# Patient Record
Sex: MALE | Race: WHITE | NOT HISPANIC OR LATINO
[De-identification: names, ages, dates, MRNs, and addresses within clinical notes are randomized per-mention and may not be internally consistent; named-entity substitution may affect disease eponyms.]

---

## 2018-02-26 ENCOUNTER — TRANSCRIPTION ENCOUNTER (OUTPATIENT)
Age: 78
End: 2018-02-26

## 2018-02-27 ENCOUNTER — APPOINTMENT (OUTPATIENT)
Dept: ORTHOPEDIC SURGERY | Facility: CLINIC | Age: 78
End: 2018-02-27
Payer: MEDICARE

## 2018-02-27 VITALS — BODY MASS INDEX: 27.83 KG/M2 | HEIGHT: 73 IN | WEIGHT: 210 LBS

## 2018-02-27 PROCEDURE — 99213 OFFICE O/P EST LOW 20 MIN: CPT

## 2018-02-27 PROCEDURE — 73552 X-RAY EXAM OF FEMUR 2/>: CPT

## 2018-02-27 PROCEDURE — 73502 X-RAY EXAM HIP UNI 2-3 VIEWS: CPT | Mod: LT

## 2018-03-14 ENCOUNTER — APPOINTMENT (OUTPATIENT)
Dept: ORTHOPEDIC SURGERY | Facility: CLINIC | Age: 78
End: 2018-03-14
Payer: MEDICARE

## 2018-03-14 PROCEDURE — 72170 X-RAY EXAM OF PELVIS: CPT

## 2018-03-14 PROCEDURE — 73552 X-RAY EXAM OF FEMUR 2/>: CPT | Mod: LT

## 2018-03-14 PROCEDURE — 99213 OFFICE O/P EST LOW 20 MIN: CPT

## 2018-04-17 ENCOUNTER — RX RENEWAL (OUTPATIENT)
Age: 78
End: 2018-04-17

## 2018-05-01 ENCOUNTER — APPOINTMENT (OUTPATIENT)
Dept: ORTHOPEDIC SURGERY | Facility: CLINIC | Age: 78
End: 2018-05-01
Payer: MEDICARE

## 2018-05-01 PROCEDURE — 73552 X-RAY EXAM OF FEMUR 2/>: CPT | Mod: LT

## 2018-05-01 PROCEDURE — 99213 OFFICE O/P EST LOW 20 MIN: CPT

## 2018-05-01 PROCEDURE — 72170 X-RAY EXAM OF PELVIS: CPT

## 2018-05-09 ENCOUNTER — APPOINTMENT (OUTPATIENT)
Dept: ORTHOPEDIC SURGERY | Facility: CLINIC | Age: 78
End: 2018-05-09

## 2018-05-17 ENCOUNTER — FORM ENCOUNTER (OUTPATIENT)
Age: 78
End: 2018-05-17

## 2018-05-18 ENCOUNTER — APPOINTMENT (OUTPATIENT)
Dept: CT IMAGING | Facility: CLINIC | Age: 78
End: 2018-05-18
Payer: MEDICARE

## 2018-05-18 ENCOUNTER — OUTPATIENT (OUTPATIENT)
Dept: OUTPATIENT SERVICES | Facility: HOSPITAL | Age: 78
LOS: 1 days | End: 2018-05-18
Payer: MEDICARE

## 2018-05-18 DIAGNOSIS — Z00.8 ENCOUNTER FOR OTHER GENERAL EXAMINATION: ICD-10-CM

## 2018-05-18 DIAGNOSIS — Z95.810 PRESENCE OF AUTOMATIC (IMPLANTABLE) CARDIAC DEFIBRILLATOR: Chronic | ICD-10-CM

## 2018-05-18 DIAGNOSIS — Z96.60 PRESENCE OF UNSPECIFIED ORTHOPEDIC JOINT IMPLANT: Chronic | ICD-10-CM

## 2018-05-18 DIAGNOSIS — Z98.89 OTHER SPECIFIED POSTPROCEDURAL STATES: Chronic | ICD-10-CM

## 2018-05-18 DIAGNOSIS — Z96.649 PRESENCE OF UNSPECIFIED ARTIFICIAL HIP JOINT: Chronic | ICD-10-CM

## 2018-05-18 DIAGNOSIS — D29.20 BENIGN NEOPLASM OF UNSPECIFIED TESTIS: Chronic | ICD-10-CM

## 2018-05-18 PROCEDURE — 76376 3D RENDER W/INTRP POSTPROCES: CPT

## 2018-05-18 PROCEDURE — 73700 CT LOWER EXTREMITY W/O DYE: CPT | Mod: 26,LT

## 2018-05-18 PROCEDURE — 76376 3D RENDER W/INTRP POSTPROCES: CPT | Mod: 26

## 2018-05-18 PROCEDURE — 73700 CT LOWER EXTREMITY W/O DYE: CPT

## 2018-06-12 ENCOUNTER — APPOINTMENT (OUTPATIENT)
Dept: ORTHOPEDIC SURGERY | Facility: CLINIC | Age: 78
End: 2018-06-12
Payer: MEDICARE

## 2018-06-12 DIAGNOSIS — M97.02XD PERIPROSTHETIC FRACTURE AROUND INTERNAL PROSTHETIC LEFT HIP JOINT, SUBSEQUENT ENCOUNTER: ICD-10-CM

## 2018-06-12 DIAGNOSIS — S32.592D OTHER SPECIFIED FRACTURE OF LEFT PUBIS, SUBSEQUENT ENCOUNTER FOR FRACTURE WITH ROUTINE HEALING: ICD-10-CM

## 2018-06-12 PROCEDURE — 99213 OFFICE O/P EST LOW 20 MIN: CPT

## 2018-06-12 PROCEDURE — 72170 X-RAY EXAM OF PELVIS: CPT

## 2018-06-13 ENCOUNTER — FORM ENCOUNTER (OUTPATIENT)
Age: 78
End: 2018-06-13

## 2018-06-14 ENCOUNTER — APPOINTMENT (OUTPATIENT)
Dept: NUCLEAR MEDICINE | Facility: IMAGING CENTER | Age: 78
End: 2018-06-14
Payer: MEDICARE

## 2018-06-14 ENCOUNTER — OUTPATIENT (OUTPATIENT)
Dept: OUTPATIENT SERVICES | Facility: HOSPITAL | Age: 78
LOS: 1 days | End: 2018-06-14
Payer: MEDICARE

## 2018-06-14 ENCOUNTER — RESULT REVIEW (OUTPATIENT)
Age: 78
End: 2018-06-14

## 2018-06-14 DIAGNOSIS — D29.20 BENIGN NEOPLASM OF UNSPECIFIED TESTIS: Chronic | ICD-10-CM

## 2018-06-14 DIAGNOSIS — Z96.649 PRESENCE OF UNSPECIFIED ARTIFICIAL HIP JOINT: Chronic | ICD-10-CM

## 2018-06-14 DIAGNOSIS — Z95.810 PRESENCE OF AUTOMATIC (IMPLANTABLE) CARDIAC DEFIBRILLATOR: Chronic | ICD-10-CM

## 2018-06-14 DIAGNOSIS — M97.02XD PERIPROSTHETIC FRACTURE AROUND INTERNAL PROSTHETIC LEFT HIP JOINT, SUBSEQUENT ENCOUNTER: ICD-10-CM

## 2018-06-14 DIAGNOSIS — Z98.89 OTHER SPECIFIED POSTPROCEDURAL STATES: Chronic | ICD-10-CM

## 2018-06-14 DIAGNOSIS — Z96.60 PRESENCE OF UNSPECIFIED ORTHOPEDIC JOINT IMPLANT: Chronic | ICD-10-CM

## 2018-06-14 PROCEDURE — 78320: CPT | Mod: 26

## 2018-06-14 PROCEDURE — 78999 UNLISTED MISC PX DX NUC MED: CPT

## 2018-06-14 PROCEDURE — 78315 BONE IMAGING 3 PHASE: CPT

## 2018-06-14 PROCEDURE — A9561: CPT

## 2020-06-12 ENCOUNTER — TRANSCRIPTION ENCOUNTER (OUTPATIENT)
Age: 80
End: 2020-06-12

## 2020-09-07 ENCOUNTER — TRANSCRIPTION ENCOUNTER (OUTPATIENT)
Age: 80
End: 2020-09-07

## 2021-01-01 ENCOUNTER — APPOINTMENT (OUTPATIENT)
Dept: MULTI SPECIALTY CLINIC | Facility: CLINIC | Age: 81
End: 2021-01-01
Payer: MEDICARE

## 2021-01-01 ENCOUNTER — NON-APPOINTMENT (OUTPATIENT)
Age: 81
End: 2021-01-01

## 2021-01-01 ENCOUNTER — RESULT REVIEW (OUTPATIENT)
Age: 81
End: 2021-01-01

## 2021-01-01 ENCOUNTER — APPOINTMENT (OUTPATIENT)
Dept: INFECTIOUS DISEASE | Facility: CLINIC | Age: 81
End: 2021-01-01
Payer: MEDICARE

## 2021-01-01 ENCOUNTER — OUTPATIENT (OUTPATIENT)
Dept: OUTPATIENT SERVICES | Facility: HOSPITAL | Age: 81
LOS: 1 days | Discharge: HOME | End: 2021-01-01
Payer: MEDICARE

## 2021-01-01 ENCOUNTER — APPOINTMENT (OUTPATIENT)
Dept: CT IMAGING | Facility: IMAGING CENTER | Age: 81
End: 2021-01-01
Payer: MEDICARE

## 2021-01-01 ENCOUNTER — APPOINTMENT (OUTPATIENT)
Dept: CARDIOLOGY | Facility: CLINIC | Age: 81
End: 2021-01-01
Payer: MEDICARE

## 2021-01-01 ENCOUNTER — OUTPATIENT (OUTPATIENT)
Dept: OUTPATIENT SERVICES | Facility: HOSPITAL | Age: 81
LOS: 1 days | End: 2021-01-01
Payer: MEDICARE

## 2021-01-01 VITALS
BODY MASS INDEX: 25.18 KG/M2 | WEIGHT: 190 LBS | SYSTOLIC BLOOD PRESSURE: 120 MMHG | HEART RATE: 85 BPM | DIASTOLIC BLOOD PRESSURE: 74 MMHG | HEIGHT: 72.83 IN | OXYGEN SATURATION: 100 %

## 2021-01-01 VITALS
TEMPERATURE: 92.3 F | DIASTOLIC BLOOD PRESSURE: 82 MMHG | RESPIRATION RATE: 18 BRPM | OXYGEN SATURATION: 99 % | BODY MASS INDEX: 25.27 KG/M2 | HEIGHT: 72.83 IN | SYSTOLIC BLOOD PRESSURE: 148 MMHG | HEART RATE: 70 BPM | WEIGHT: 190.7 LBS

## 2021-01-01 VITALS — SYSTOLIC BLOOD PRESSURE: 112 MMHG | DIASTOLIC BLOOD PRESSURE: 60 MMHG

## 2021-01-01 DIAGNOSIS — Z96.60 PRESENCE OF UNSPECIFIED ORTHOPEDIC JOINT IMPLANT: Chronic | ICD-10-CM

## 2021-01-01 DIAGNOSIS — D29.20 BENIGN NEOPLASM OF UNSPECIFIED TESTIS: Chronic | ICD-10-CM

## 2021-01-01 DIAGNOSIS — C25.9 MALIGNANT NEOPLASM OF PANCREAS, UNSPECIFIED: ICD-10-CM

## 2021-01-01 DIAGNOSIS — Z89.421 ACQUIRED ABSENCE OF OTHER RIGHT TOE(S): Chronic | ICD-10-CM

## 2021-01-01 DIAGNOSIS — Z90.410 ACQUIRED TOTAL ABSENCE OF PANCREAS: Chronic | ICD-10-CM

## 2021-01-01 DIAGNOSIS — Z95.810 PRESENCE OF AUTOMATIC (IMPLANTABLE) CARDIAC DEFIBRILLATOR: Chronic | ICD-10-CM

## 2021-01-01 DIAGNOSIS — Z95.2 PRESENCE OF PROSTHETIC HEART VALVE: Chronic | ICD-10-CM

## 2021-01-01 DIAGNOSIS — Z87.81 PERSONAL HISTORY OF (HEALED) TRAUMATIC FRACTURE: Chronic | ICD-10-CM

## 2021-01-01 DIAGNOSIS — B95.2 BACTEREMIA: ICD-10-CM

## 2021-01-01 DIAGNOSIS — Z98.89 OTHER SPECIFIED POSTPROCEDURAL STATES: Chronic | ICD-10-CM

## 2021-01-01 DIAGNOSIS — R78.81 BACTEREMIA: ICD-10-CM

## 2021-01-01 LAB
ALBUMIN SERPL ELPH-MCNC: 4.1 G/DL
ALP BLD-CCNC: 67 U/L
ALT SERPL-CCNC: 5 U/L
ANION GAP SERPL CALC-SCNC: 13 MMOL/L
AST SERPL-CCNC: 18 U/L
BASOPHILS # BLD AUTO: 0 K/UL — SIGNIFICANT CHANGE UP (ref 0–0.2)
BASOPHILS NFR BLD AUTO: 0 % — SIGNIFICANT CHANGE UP (ref 0–2)
BILIRUB SERPL-MCNC: 0.2 MG/DL
BUN SERPL-MCNC: 10 MG/DL
CALCIUM SERPL-MCNC: 8.9 MG/DL
CANCER AG19-9 SERPL-ACNC: 47 U/ML
CHLORIDE SERPL-SCNC: 95 MMOL/L
CO2 SERPL-SCNC: 24 MMOL/L
CREAT SERPL-MCNC: 0.82 MG/DL
EOSINOPHIL # BLD AUTO: 0.44 K/UL — SIGNIFICANT CHANGE UP (ref 0–0.5)
EOSINOPHIL NFR BLD AUTO: 9 % — HIGH (ref 0–6)
GLUCOSE SERPL-MCNC: 99 MG/DL
HCT VFR BLD CALC: 32.3 % — LOW (ref 39–50)
HGB BLD-MCNC: 9.8 G/DL — LOW (ref 13–17)
LYMPHOCYTES # BLD AUTO: 0.64 K/UL — LOW (ref 1–3.3)
LYMPHOCYTES # BLD AUTO: 13 % — SIGNIFICANT CHANGE UP (ref 13–44)
MCHC RBC-ENTMCNC: 29.5 PG — SIGNIFICANT CHANGE UP (ref 27–34)
MCHC RBC-ENTMCNC: 30.3 G/DL — LOW (ref 32–36)
MCV RBC AUTO: 97.3 FL — SIGNIFICANT CHANGE UP (ref 80–100)
MONOCYTES # BLD AUTO: 0.49 K/UL — SIGNIFICANT CHANGE UP (ref 0–0.9)
MONOCYTES NFR BLD AUTO: 10 % — SIGNIFICANT CHANGE UP (ref 2–14)
NEUTROPHILS # BLD AUTO: 3.35 K/UL — SIGNIFICANT CHANGE UP (ref 1.8–7.4)
NEUTROPHILS NFR BLD AUTO: 68 % — SIGNIFICANT CHANGE UP (ref 43–77)
NRBC # BLD: 0 /100 — SIGNIFICANT CHANGE UP (ref 0–0)
NRBC # BLD: SIGNIFICANT CHANGE UP /100 WBCS (ref 0–0)
PLAT MORPH BLD: NORMAL — SIGNIFICANT CHANGE UP
PLATELET # BLD AUTO: 171 K/UL — SIGNIFICANT CHANGE UP (ref 150–400)
POTASSIUM SERPL-SCNC: 4 MMOL/L
PROT SERPL-MCNC: 6.8 G/DL
RBC # BLD: 3.32 M/UL — LOW (ref 4.2–5.8)
RBC # FLD: 15.6 % — HIGH (ref 10.3–14.5)
RBC BLD AUTO: SIGNIFICANT CHANGE UP
SODIUM SERPL-SCNC: 132 MMOL/L
WBC # BLD: 4.92 K/UL — SIGNIFICANT CHANGE UP (ref 3.8–10.5)
WBC # FLD AUTO: 4.92 K/UL — SIGNIFICANT CHANGE UP (ref 3.8–10.5)

## 2021-01-01 PROCEDURE — 71260 CT THORAX DX C+: CPT | Mod: MG

## 2021-01-01 PROCEDURE — 99215 OFFICE O/P EST HI 40 MIN: CPT

## 2021-01-01 PROCEDURE — 99024 POSTOP FOLLOW-UP VISIT: CPT

## 2021-01-01 PROCEDURE — 77290 THER RAD SIMULAJ FIELD CPLX: CPT | Mod: 26

## 2021-01-01 PROCEDURE — 71260 CT THORAX DX C+: CPT | Mod: 26,MG

## 2021-01-01 PROCEDURE — 77333 RADIATION TREATMENT AID(S): CPT | Mod: 26,59

## 2021-01-01 PROCEDURE — 74177 CT ABD & PELVIS W/CONTRAST: CPT | Mod: MG

## 2021-01-01 PROCEDURE — G1004: CPT

## 2021-01-01 PROCEDURE — 74177 CT ABD & PELVIS W/CONTRAST: CPT | Mod: 26,MG

## 2021-01-01 PROCEDURE — 82565 ASSAY OF CREATININE: CPT

## 2021-01-01 PROCEDURE — 77263 THER RADIOLOGY TX PLNG CPLX: CPT

## 2021-01-01 PROCEDURE — 99202 OFFICE O/P NEW SF 15 MIN: CPT | Mod: 25,GC

## 2021-01-01 PROCEDURE — 77334 RADIATION TREATMENT AID(S): CPT | Mod: 26

## 2021-01-01 PROCEDURE — 93000 ELECTROCARDIOGRAM COMPLETE: CPT

## 2021-01-01 PROCEDURE — 99441: CPT | Mod: 95

## 2021-01-01 RX ORDER — OMEPRAZOLE 20 MG
20 TABLET, DELAYED RELEASE (ENTERIC COATED) ORAL
Refills: 0 | Status: DISCONTINUED | COMMUNITY
End: 2021-01-01

## 2021-01-01 RX ORDER — ALBUTEROL SULFATE 90 UG/1
108 (90 BASE) INHALANT RESPIRATORY (INHALATION)
Refills: 0 | Status: DISCONTINUED | COMMUNITY
End: 2021-01-01

## 2021-01-01 RX ORDER — RIVAROXABAN 2.5 MG/1
TABLET, FILM COATED ORAL DAILY
Refills: 0 | Status: DISCONTINUED | COMMUNITY
End: 2021-01-01

## 2021-01-01 RX ORDER — DULOXETINE HYDROCHLORIDE 60 MG/1
60 CAPSULE, DELAYED RELEASE PELLETS ORAL
Qty: 90 | Refills: 3 | Status: DISCONTINUED | COMMUNITY
End: 2021-01-01

## 2021-01-01 RX ORDER — SACUBITRIL AND VALSARTAN 24; 26 MG/1; MG/1
24-26 TABLET, FILM COATED ORAL TWICE DAILY
Qty: 60 | Refills: 1 | Status: DISCONTINUED | COMMUNITY
End: 2021-01-01

## 2021-01-01 RX ORDER — OXYCODONE 10 MG/1
10 TABLET ORAL EVERY 6 HOURS
Refills: 0 | Status: DISCONTINUED | COMMUNITY
End: 2021-01-01

## 2021-01-01 RX ORDER — SODIUM PHOSPHATE, DIBASIC AND SODIUM PHOSPHATE, MONOBASIC 7; 19 G/230ML; G/230ML
ENEMA RECTAL
Refills: 0 | Status: DISCONTINUED | COMMUNITY
End: 2021-01-01

## 2021-08-04 ENCOUNTER — OUTPATIENT (OUTPATIENT)
Dept: OUTPATIENT SERVICES | Facility: HOSPITAL | Age: 81
LOS: 1 days | Discharge: HOME | End: 2021-08-04
Payer: MEDICARE

## 2021-08-04 ENCOUNTER — APPOINTMENT (OUTPATIENT)
Dept: WOUND CARE | Facility: HOSPITAL | Age: 81
End: 2021-08-04
Payer: MEDICARE

## 2021-08-04 ENCOUNTER — RESULT REVIEW (OUTPATIENT)
Age: 81
End: 2021-08-04

## 2021-08-04 VITALS
HEART RATE: 80 BPM | HEIGHT: 73 IN | TEMPERATURE: 97.1 F | SYSTOLIC BLOOD PRESSURE: 117 MMHG | BODY MASS INDEX: 27.83 KG/M2 | OXYGEN SATURATION: 100 % | RESPIRATION RATE: 20 BRPM | WEIGHT: 210 LBS | DIASTOLIC BLOOD PRESSURE: 72 MMHG

## 2021-08-04 DIAGNOSIS — Z95.810 PRESENCE OF AUTOMATIC (IMPLANTABLE) CARDIAC DEFIBRILLATOR: Chronic | ICD-10-CM

## 2021-08-04 DIAGNOSIS — L97.501 NON-PRESSURE CHRONIC ULCER OF OTHER PART OF UNSPECIFIED FOOT LIMITED TO BREAKDOWN OF SKIN: ICD-10-CM

## 2021-08-04 DIAGNOSIS — Z96.649 PRESENCE OF UNSPECIFIED ARTIFICIAL HIP JOINT: Chronic | ICD-10-CM

## 2021-08-04 DIAGNOSIS — Z86.69 PERSONAL HISTORY OF OTHER DISEASES OF THE NERVOUS SYSTEM AND SENSE ORGANS: ICD-10-CM

## 2021-08-04 DIAGNOSIS — Z96.60 PRESENCE OF UNSPECIFIED ORTHOPEDIC JOINT IMPLANT: Chronic | ICD-10-CM

## 2021-08-04 DIAGNOSIS — D29.20 BENIGN NEOPLASM OF UNSPECIFIED TESTIS: Chronic | ICD-10-CM

## 2021-08-04 DIAGNOSIS — Z86.79 PERSONAL HISTORY OF OTHER DISEASES OF THE CIRCULATORY SYSTEM: ICD-10-CM

## 2021-08-04 DIAGNOSIS — S41.109A UNSPECIFIED OPEN WOUND OF UNSPECIFIED UPPER ARM, INITIAL ENCOUNTER: ICD-10-CM

## 2021-08-04 DIAGNOSIS — Z98.89 OTHER SPECIFIED POSTPROCEDURAL STATES: Chronic | ICD-10-CM

## 2021-08-04 PROCEDURE — 73630 X-RAY EXAM OF FOOT: CPT | Mod: 26,RT

## 2021-08-04 PROCEDURE — G0463: CPT

## 2021-08-04 PROCEDURE — 73630 X-RAY EXAM OF FOOT: CPT

## 2021-08-04 PROCEDURE — 99203 OFFICE O/P NEW LOW 30 MIN: CPT

## 2021-08-04 RX ORDER — NEOMYCIN AND POLYMYXIN B SULFATES AND DEXAMETHASONE 3.5; 10000; 1 MG/G; [IU]/G; MG/G
3.5-10000-0.1 OINTMENT OPHTHALMIC
Qty: 4 | Refills: 0 | Status: DISCONTINUED | COMMUNITY
Start: 2018-04-10 | End: 2021-08-04

## 2021-08-04 RX ORDER — HYDROCODONE BITARTRATE AND ACETAMINOPHEN 5; 325 MG/1; MG/1
5-325 TABLET ORAL
Qty: 40 | Refills: 0 | Status: DISCONTINUED | COMMUNITY
Start: 2018-06-13 | End: 2021-08-04

## 2021-08-04 RX ORDER — DOXYCYCLINE HYCLATE 100 MG/1
100 CAPSULE ORAL
Qty: 30 | Refills: 0 | Status: DISCONTINUED | COMMUNITY
Start: 2018-04-18 | End: 2021-08-04

## 2021-08-04 RX ORDER — TRAMADOL HYDROCHLORIDE 50 MG/1
50 TABLET, COATED ORAL
Qty: 50 | Refills: 0 | Status: DISCONTINUED | COMMUNITY
Start: 2018-03-14 | End: 2021-08-04

## 2021-08-04 RX ORDER — TESTOSTERONE CYPIONATE 200 MG/ML
200 INJECTION, SOLUTION INTRAMUSCULAR
Qty: 1 | Refills: 0 | Status: DISCONTINUED | COMMUNITY
Start: 2018-03-27 | End: 2021-08-04

## 2021-08-04 RX ORDER — CEFADROXIL 500 MG/1
500 CAPSULE ORAL
Qty: 20 | Refills: 0 | Status: DISCONTINUED | COMMUNITY
Start: 2018-03-24 | End: 2021-08-04

## 2021-08-04 RX ORDER — TRAMADOL HYDROCHLORIDE 50 MG/1
50 TABLET, COATED ORAL
Qty: 40 | Refills: 0 | Status: DISCONTINUED | COMMUNITY
Start: 2018-05-01 | End: 2021-08-04

## 2021-08-04 RX ORDER — DICLOFENAC SODIUM 75 MG/1
75 TABLET, DELAYED RELEASE ORAL
Qty: 60 | Refills: 3 | Status: DISCONTINUED | COMMUNITY
Start: 2018-02-27 | End: 2021-08-04

## 2021-08-04 RX ORDER — HYDROCODONE BITARTRATE AND ACETAMINOPHEN 10; 325 MG/1; MG/1
10-325 TABLET ORAL
Qty: 60 | Refills: 0 | Status: DISCONTINUED | COMMUNITY
Start: 2018-03-05 | End: 2021-08-04

## 2021-08-04 RX ORDER — TOBRAMYCIN AND DEXAMETHASONE 3; 1 MG/G; MG/G
0.3-0.1 OINTMENT OPHTHALMIC
Qty: 4 | Refills: 0 | Status: DISCONTINUED | COMMUNITY
Start: 2018-03-24 | End: 2021-08-04

## 2021-08-04 RX ORDER — MELOXICAM 15 MG/1
15 TABLET ORAL DAILY
Qty: 21 | Refills: 0 | Status: DISCONTINUED | COMMUNITY
Start: 2018-03-06 | End: 2021-08-04

## 2021-08-04 RX ORDER — CIPROFLOXACIN AND DEXAMETHASONE 3; 1 MG/ML; MG/ML
0.3-0.1 SUSPENSION/ DROPS AURICULAR (OTIC)
Qty: 8 | Refills: 0 | Status: DISCONTINUED | COMMUNITY
Start: 2018-02-03 | End: 2021-08-04

## 2021-08-04 NOTE — REVIEW OF SYSTEMS
[Skin Wound] : skin wound [FreeTextEntry3] : glaucoma, macular degeneration [FreeTextEntry5] : cardiac defibrillator, htn, hld [FreeTextEntry9] : bilateral hammertoes [de-identified] : right hallux dorsal IPJ ulcer down to skin, subcutaneous tissue, fat, bone [de-identified] : lumbar radiculopathy, sciatica, spinal stenosis [de-identified] : pancreatic cancer

## 2021-08-04 NOTE — PHYSICAL EXAM
[2+] : left 2+ [Skin Ulcer] : ulcer [2 x 2] : 2 x 2  [Ankle Swelling (On Exam)] : not present [Varicose Veins Of Lower Extremities] : not present [] : not present [de-identified] : A&Ox3, NAD [de-identified] : Bilateral hammertoes [de-identified] : right hallux dorsal IPJ ulcer down to skin, subcutaneous tissue, fat, bone [de-identified] : diminished light touch sensation bilaterally [FreeTextEntry1] : Right dorsal hallux [FreeTextEntry2] : 0.4 [FreeTextEntry3] : 0.4 [FreeTextEntry4] : 0.1 [de-identified] : scant serosanguineous [de-identified] : mild maceration [de-identified] : 100% [de-identified] : Alginate Ag [de-identified] : NSC [de-identified] : 3x Weekly

## 2021-08-04 NOTE — HISTORY OF PRESENT ILLNESS
[FreeTextEntry1] : The wound is a traumatic wound on right great toe.  Patient reports that it started a year ago as a blister from wearing sneakers without socks.  Patient has been tx Dr. Rosalio DPM in [place] over the past year and the wound has healed an reopened several times.  Dr. Santamaria did xray a couple of weeks ago and patient reports no fx or bone infection.   Patient reports that he was at tx at [facility] last week due to fall.  Patient reports that he bruised his back (xray negative for fx).  Patient was also tx for pneumonia and had venous doppler of swollen legs which was negative for DVT.  Patient's daughter referred patient to [facility].

## 2021-08-04 NOTE — PLAN
[FreeTextEntry1] : Patient examined and evaluated at this time.\par Advised regarding need for xray and bone scan to rule out osteomyelitis.\par All questions answered to satisfaction and patient and son verbalized understanding.\par Patient will likely require surgical intervention.\par Continue local wound care and offloading.\par Spent 30 minutes for patient care and medical decision making.\par Patient to follow up in 1 week.\par

## 2021-08-04 NOTE — VITALS
[] : No [de-identified] : Pain scale:  0/10 - Patient reports no c/o pains or discomforts at present.

## 2021-08-04 NOTE — ASSESSMENT
[Verbal] : Verbal [Patient] : Patient [Family member] : Family member [Good - alert, interested, motivated] : Good - alert, interested, motivated [Verbalizes knowledge/Understanding] : Verbalizes knowledge/understanding [Foot Care] : foot care [Skin Care] : skin care [Signs and symptoms of infection] : sign and symptoms of infection [How and When to Call] : how and when to call [Off-loading] : off-loading [Patient responsibility to plan of care] : patient responsibility to plan of care [] : Yes [Labs and Tests] : labs and tests [Stable] : stable [Home] : Home [Cane] : Cane [Not Applicable - Long Term Care/Home Health Agency] : Long Term Care/Home Health Agency: Not Applicable [FreeTextEntry2] : Promote optimal skin integrity, offloading, infection prevention\par  [FreeTextEntry4] : Circulation:\par \par Dorsalis Pedis:  bilateral palpable\par Posterior Tibialis:  bilateral  palpable\par Doppler Pulses:  bilateral  present\par Extremity Color:  bilateral pink \par Extremity Temperature:  bilateral warm\par Capillary Refill:  bilateral <3 sec \par Non-invasive vascular studies not ordered as per DPM.\par \par Patient did not bring in medication list this visit.  Patient was able to recall most medications today.  Instructed him to bring in list next visit.\par \par Xray of right foot to be done today at [facility].\par \par Bone scan ordered - submitted for authorization\par \par Patient reports that he hit his left arm on wall when trying to stop his grandson from falling. No open wound present.  Lump on forearm extending to elbow.  Patient was seen by Dr. White with no treatment and was referred to see his Orthopedic Surgeon.\par \par f/u 1 week\par

## 2021-08-05 DIAGNOSIS — H40.9 UNSPECIFIED GLAUCOMA: ICD-10-CM

## 2021-08-05 DIAGNOSIS — I10 ESSENTIAL (PRIMARY) HYPERTENSION: ICD-10-CM

## 2021-08-05 DIAGNOSIS — Z95.810 PRESENCE OF AUTOMATIC (IMPLANTABLE) CARDIAC DEFIBRILLATOR: ICD-10-CM

## 2021-08-05 DIAGNOSIS — L89.894 PRESSURE ULCER OF OTHER SITE, STAGE 4: ICD-10-CM

## 2021-08-05 DIAGNOSIS — Z95.4 PRESENCE OF OTHER HEART-VALVE REPLACEMENT: ICD-10-CM

## 2021-08-05 DIAGNOSIS — M20.41 OTHER HAMMER TOE(S) (ACQUIRED), RIGHT FOOT: ICD-10-CM

## 2021-08-05 DIAGNOSIS — H35.30 UNSPECIFIED MACULAR DEGENERATION: ICD-10-CM

## 2021-08-05 DIAGNOSIS — Z79.899 OTHER LONG TERM (CURRENT) DRUG THERAPY: ICD-10-CM

## 2021-08-05 DIAGNOSIS — M48.00 SPINAL STENOSIS, SITE UNSPECIFIED: ICD-10-CM

## 2021-08-05 DIAGNOSIS — Z85.07 PERSONAL HISTORY OF MALIGNANT NEOPLASM OF PANCREAS: ICD-10-CM

## 2021-08-05 DIAGNOSIS — Z79.2 LONG TERM (CURRENT) USE OF ANTIBIOTICS: ICD-10-CM

## 2021-08-05 DIAGNOSIS — E78.00 PURE HYPERCHOLESTEROLEMIA, UNSPECIFIED: ICD-10-CM

## 2021-08-05 DIAGNOSIS — Z87.81 PERSONAL HISTORY OF (HEALED) TRAUMATIC FRACTURE: ICD-10-CM

## 2021-08-05 DIAGNOSIS — M20.42 OTHER HAMMER TOE(S) (ACQUIRED), LEFT FOOT: ICD-10-CM

## 2021-08-05 DIAGNOSIS — Z96.649 PRESENCE OF UNSPECIFIED ARTIFICIAL HIP JOINT: ICD-10-CM

## 2021-08-05 DIAGNOSIS — Z98.890 OTHER SPECIFIED POSTPROCEDURAL STATES: ICD-10-CM

## 2021-08-05 DIAGNOSIS — Z79.01 LONG TERM (CURRENT) USE OF ANTICOAGULANTS: ICD-10-CM

## 2021-08-11 ENCOUNTER — OUTPATIENT (OUTPATIENT)
Dept: OUTPATIENT SERVICES | Facility: HOSPITAL | Age: 81
LOS: 1 days | Discharge: HOME | End: 2021-08-11
Payer: MEDICARE

## 2021-08-11 ENCOUNTER — APPOINTMENT (OUTPATIENT)
Dept: WOUND CARE | Facility: HOSPITAL | Age: 81
End: 2021-08-11
Payer: MEDICARE

## 2021-08-11 VITALS
SYSTOLIC BLOOD PRESSURE: 97 MMHG | TEMPERATURE: 97.8 F | BODY MASS INDEX: 27.83 KG/M2 | OXYGEN SATURATION: 99 % | WEIGHT: 210 LBS | DIASTOLIC BLOOD PRESSURE: 59 MMHG | HEART RATE: 80 BPM | RESPIRATION RATE: 18 BRPM | HEIGHT: 73 IN

## 2021-08-11 DIAGNOSIS — Z96.60 PRESENCE OF UNSPECIFIED ORTHOPEDIC JOINT IMPLANT: Chronic | ICD-10-CM

## 2021-08-11 DIAGNOSIS — S41.109A UNSPECIFIED OPEN WOUND OF UNSPECIFIED UPPER ARM, INITIAL ENCOUNTER: ICD-10-CM

## 2021-08-11 DIAGNOSIS — Z95.810 PRESENCE OF AUTOMATIC (IMPLANTABLE) CARDIAC DEFIBRILLATOR: Chronic | ICD-10-CM

## 2021-08-11 DIAGNOSIS — D29.20 BENIGN NEOPLASM OF UNSPECIFIED TESTIS: Chronic | ICD-10-CM

## 2021-08-11 DIAGNOSIS — Z98.89 OTHER SPECIFIED POSTPROCEDURAL STATES: Chronic | ICD-10-CM

## 2021-08-11 DIAGNOSIS — Z96.649 PRESENCE OF UNSPECIFIED ARTIFICIAL HIP JOINT: Chronic | ICD-10-CM

## 2021-08-11 PROCEDURE — 99213 OFFICE O/P EST LOW 20 MIN: CPT

## 2021-08-11 PROCEDURE — G0463: CPT

## 2021-08-11 RX ORDER — DULOXETINE HYDROCHLORIDE 60 MG/1
60 CAPSULE, DELAYED RELEASE ORAL
Refills: 0 | Status: ACTIVE | COMMUNITY

## 2021-08-11 RX ORDER — LATANOPROST/PF 0.005 %
0.01 DROPS OPHTHALMIC (EYE)
Refills: 0 | Status: ACTIVE | COMMUNITY

## 2021-08-11 RX ORDER — PROCHLORPERAZINE MALEATE 10 MG/1
10 TABLET, FILM COATED ORAL
Refills: 0 | Status: ACTIVE | COMMUNITY

## 2021-08-11 RX ORDER — DORZOLAMIDE HYDROCHLORIDE AND TIMOLOL MALEATE 20; 5 MG/ML; MG/ML
22.3-6.8 SOLUTION/ DROPS OPHTHALMIC
Refills: 0 | Status: ACTIVE | COMMUNITY

## 2021-08-11 RX ORDER — KETOROLAC TROMETHAMINE 5 MG/ML
0.5 SOLUTION OPHTHALMIC
Refills: 0 | Status: ACTIVE | COMMUNITY

## 2021-08-11 RX ORDER — LIPASE/PROTEASE/AMYLASE 4.5-25-20K
CAPSULE,DELAYED RELEASE (ENTERIC COATED) ORAL
Refills: 0 | Status: ACTIVE | COMMUNITY

## 2021-08-11 RX ORDER — TAMSULOSIN HYDROCHLORIDE 0.4 MG/1
0.4 CAPSULE ORAL
Refills: 0 | Status: ACTIVE | COMMUNITY

## 2021-08-11 NOTE — REVIEW OF SYSTEMS
[Skin Wound] : skin wound [FreeTextEntry3] : glaucoma, macular degeneration [FreeTextEntry5] : cardiac defibrillator, htn, hld [FreeTextEntry9] : bilateral hammertoes [de-identified] : right hallux dorsal IPJ ulcer down to skin, subcutaneous tissue, fat, bone [de-identified] : lumbar radiculopathy, sciatica, spinal stenosis [de-identified] : pancreatic cancer

## 2021-08-11 NOTE — PLAN
[FreeTextEntry1] : Patient examined and evaluated at this time.\par Reviewed radiographs with patient.\par Advised regarding need for bone scan to rule out osteomyelitis.\par All questions answered to satisfaction and patient verbalized understanding.\par Continue local wound care and offloading.\par Pt will likely require surgical intervention.\par Spent 20 minutes for patient care and medical decision making.\par Patient to follow up in 1 week.\par

## 2021-08-11 NOTE — ASSESSMENT
[FreeTextEntry2] : Promote optimal skin integrity, offloading, infection prevention\par  [FreeTextEntry4] : Patient reports that he hit his left arm on wall when trying to stop his grandson from falling. No open wound present.  Lump on forearm extending to elbow.  Patient was seen by Dr. Shah. MD stated Pt should get an X-ray. After Pt should see an Orthopedic Surgeon.\par Pt to F/U to [facility] in 1 Week \par

## 2021-08-11 NOTE — REVIEW OF SYSTEMS
[Skin Wound] : skin wound [FreeTextEntry3] : glaucoma, macular degeneration [FreeTextEntry5] : cardiac defibrillator, htn, hld [FreeTextEntry9] : bilateral hammertoes [de-identified] : right hallux dorsal IPJ ulcer down to skin, subcutaneous tissue, fat, bone [de-identified] : lumbar radiculopathy, sciatica, spinal stenosis [de-identified] : pancreatic cancer

## 2021-08-11 NOTE — ASSESSMENT
[Verbal] : Verbal [Patient] : Patient [Family member] : Family member [Good - alert, interested, motivated] : Good - alert, interested, motivated [Verbalizes knowledge/Understanding] : Verbalizes knowledge/understanding [Foot Care] : foot care [Skin Care] : skin care [Signs and symptoms of infection] : sign and symptoms of infection [How and When to Call] : how and when to call [Labs and Tests] : labs and tests [Off-loading] : off-loading [Patient responsibility to plan of care] : patient responsibility to plan of care [] : Yes [Stable] : stable [Home] : Home [Cane] : Cane [FreeTextEntry2] : Infection Prevention \par Bone Scan\par Restore Optimal Skin Integrity\par Surgery \par \par  [FreeTextEntry4] : DPM reviewed Xray of right foot with Pt. Pt Verbalized Understanding \par Pt was approved for Bone scan. Pt aware & will schedule appointment & will follow up to [facility] after scan.\par Pt also seen by Dr. Shah\par \par Patient reports that he hit his left arm on wall when trying to stop his grandson from falling. No open wound present.  Lump on forearm extending to elbow.  Patient was seen by Dr. White with no treatment and was referred to see his Orthopedic Surgeon.\par \par f/u 1 week\par

## 2021-08-11 NOTE — PHYSICAL EXAM
[2 x 2] : 2 x 2  [2+] : left 2+ [Ankle Swelling (On Exam)] : not present [Varicose Veins Of Lower Extremities] : not present [] : not present [Skin Ulcer] : ulcer [de-identified] : A&Ox3, NAD [de-identified] : Bilateral hammertoes [de-identified] : right hallux dorsal IPJ ulcer down to skin, subcutaneous tissue, fat, bone [de-identified] : diminished light touch sensation bilaterally [FreeTextEntry1] : Right dorsal hallux [FreeTextEntry2] : 0.4 [FreeTextEntry3] : 0.4 [FreeTextEntry4] : 0.1 [de-identified] : scant serosanguineous [de-identified] : mild maceration [de-identified] : 100% [de-identified] : Silver Alginate  [de-identified] : Cleansed with Normal Saline.  [de-identified] : 3x Weekly

## 2021-08-11 NOTE — HISTORY OF PRESENT ILLNESS
[FreeTextEntry1] : 81 yo WM, here with his wife for a podiatry appt with Dr. López. Pt also mentioned that he had a lump/mass on his left elbow. Pt stated he banged his left elbow about 3 mos ago, cutting the area, causing bleeding. Was seen in a urgent care center. The wound eventually healed on its own, but a nontender mass/lump still present in the left area. Laceration scars seen in the left elbow and findings c/w with a seroma. Nontender/no erythema or any evidence of an abscess. Advised a xray of the area and to f/u with an ortho surgeon.

## 2021-08-11 NOTE — HISTORY OF PRESENT ILLNESS
[FreeTextEntry1] : Pt seen for right hallux ulcer down to skin, subcutaneous tissue, fat, and bone. Pt relates he was able to obtain the radiographs as advised. Denies any other complaints at this time.

## 2021-08-11 NOTE — PHYSICAL EXAM
[JVD] : no jugular venous distention  [Normal Thyroid] : the thyroid was normal [Normal Breath Sounds] : Normal breath sounds [Normal Heart Sounds] : normal heart sounds [Normal Rate and Rhythm] : normal rate and rhythm [Abdomen Masses] : No abdominal massess [Abdomen Tenderness] : ~T ~M No abdominal tenderness [Tender] : nontender [Enlarged] : not enlarged [Alert] : alert [Oriented to Person] : oriented to person [Oriented to Place] : oriented to place [Oriented to Time] : oriented to time [Calm] : calm [de-identified] : elderly WM, NAD, alert, Ox3. [de-identified] : nontender; no erythema/edema/cellulitis [FreeTextEntry1] : Elbow-mass under skin- no open wounds [de-identified] : No Treatment  [de-identified] : Cleansed with Normal Saline.  [TWNoteComboBox1] : Left [TWNoteComboBox4] : None

## 2021-08-12 DIAGNOSIS — E78.00 PURE HYPERCHOLESTEROLEMIA, UNSPECIFIED: ICD-10-CM

## 2021-08-12 DIAGNOSIS — Z98.890 OTHER SPECIFIED POSTPROCEDURAL STATES: ICD-10-CM

## 2021-08-12 DIAGNOSIS — H40.9 UNSPECIFIED GLAUCOMA: ICD-10-CM

## 2021-08-12 DIAGNOSIS — H35.30 UNSPECIFIED MACULAR DEGENERATION: ICD-10-CM

## 2021-08-12 DIAGNOSIS — Z95.4 PRESENCE OF OTHER HEART-VALVE REPLACEMENT: ICD-10-CM

## 2021-08-12 DIAGNOSIS — Z79.01 LONG TERM (CURRENT) USE OF ANTICOAGULANTS: ICD-10-CM

## 2021-08-12 DIAGNOSIS — L89.894 PRESSURE ULCER OF OTHER SITE, STAGE 4: ICD-10-CM

## 2021-08-12 DIAGNOSIS — Z79.51 LONG TERM (CURRENT) USE OF INHALED STEROIDS: ICD-10-CM

## 2021-08-12 DIAGNOSIS — Z79.2 LONG TERM (CURRENT) USE OF ANTIBIOTICS: ICD-10-CM

## 2021-08-12 DIAGNOSIS — I10 ESSENTIAL (PRIMARY) HYPERTENSION: ICD-10-CM

## 2021-08-12 DIAGNOSIS — Z96.649 PRESENCE OF UNSPECIFIED ARTIFICIAL HIP JOINT: ICD-10-CM

## 2021-08-12 DIAGNOSIS — Z95.810 PRESENCE OF AUTOMATIC (IMPLANTABLE) CARDIAC DEFIBRILLATOR: ICD-10-CM

## 2021-08-12 DIAGNOSIS — Z79.899 OTHER LONG TERM (CURRENT) DRUG THERAPY: ICD-10-CM

## 2021-08-12 DIAGNOSIS — R22.32 LOCALIZED SWELLING, MASS AND LUMP, LEFT UPPER LIMB: ICD-10-CM

## 2021-08-12 DIAGNOSIS — M48.00 SPINAL STENOSIS, SITE UNSPECIFIED: ICD-10-CM

## 2021-08-12 DIAGNOSIS — Z85.07 PERSONAL HISTORY OF MALIGNANT NEOPLASM OF PANCREAS: ICD-10-CM

## 2021-08-12 DIAGNOSIS — Z87.81 PERSONAL HISTORY OF (HEALED) TRAUMATIC FRACTURE: ICD-10-CM

## 2021-08-12 DIAGNOSIS — M20.41 OTHER HAMMER TOE(S) (ACQUIRED), RIGHT FOOT: ICD-10-CM

## 2021-08-12 DIAGNOSIS — M20.42 OTHER HAMMER TOE(S) (ACQUIRED), LEFT FOOT: ICD-10-CM

## 2021-08-17 ENCOUNTER — OUTPATIENT (OUTPATIENT)
Dept: OUTPATIENT SERVICES | Facility: HOSPITAL | Age: 81
LOS: 1 days | End: 2021-08-17
Payer: MEDICARE

## 2021-08-17 DIAGNOSIS — Z95.810 PRESENCE OF AUTOMATIC (IMPLANTABLE) CARDIAC DEFIBRILLATOR: Chronic | ICD-10-CM

## 2021-08-17 DIAGNOSIS — Z96.60 PRESENCE OF UNSPECIFIED ORTHOPEDIC JOINT IMPLANT: Chronic | ICD-10-CM

## 2021-08-17 DIAGNOSIS — L89.894 PRESSURE ULCER OF OTHER SITE, STAGE 4: ICD-10-CM

## 2021-08-17 DIAGNOSIS — D29.20 BENIGN NEOPLASM OF UNSPECIFIED TESTIS: Chronic | ICD-10-CM

## 2021-08-17 DIAGNOSIS — Z96.649 PRESENCE OF UNSPECIFIED ARTIFICIAL HIP JOINT: Chronic | ICD-10-CM

## 2021-08-17 DIAGNOSIS — Z98.89 OTHER SPECIFIED POSTPROCEDURAL STATES: Chronic | ICD-10-CM

## 2021-08-17 PROCEDURE — 73080 X-RAY EXAM OF ELBOW: CPT | Mod: 26,LT

## 2021-08-18 ENCOUNTER — RESULT REVIEW (OUTPATIENT)
Age: 81
End: 2021-08-18

## 2021-08-18 PROCEDURE — A9569: CPT

## 2021-08-18 PROCEDURE — 78800 RP LOCLZJ TUM 1 AREA 1 D IMG: CPT | Mod: 26,MH

## 2021-08-18 PROCEDURE — 78800 RP LOCLZJ TUM 1 AREA 1 D IMG: CPT

## 2021-08-18 PROCEDURE — 73080 X-RAY EXAM OF ELBOW: CPT

## 2021-08-27 ENCOUNTER — OUTPATIENT (OUTPATIENT)
Dept: OUTPATIENT SERVICES | Facility: HOSPITAL | Age: 81
LOS: 1 days | Discharge: HOME | End: 2021-08-27
Payer: MEDICARE

## 2021-08-27 ENCOUNTER — APPOINTMENT (OUTPATIENT)
Dept: WOUND CARE | Facility: HOSPITAL | Age: 81
End: 2021-08-27
Payer: MEDICARE

## 2021-08-27 VITALS
TEMPERATURE: 97.1 F | BODY MASS INDEX: 27.83 KG/M2 | WEIGHT: 210 LBS | RESPIRATION RATE: 20 BRPM | OXYGEN SATURATION: 98 % | HEART RATE: 83 BPM | SYSTOLIC BLOOD PRESSURE: 106 MMHG | DIASTOLIC BLOOD PRESSURE: 59 MMHG | HEIGHT: 73 IN

## 2021-08-27 DIAGNOSIS — Z96.649 PRESENCE OF UNSPECIFIED ARTIFICIAL HIP JOINT: Chronic | ICD-10-CM

## 2021-08-27 DIAGNOSIS — D29.20 BENIGN NEOPLASM OF UNSPECIFIED TESTIS: Chronic | ICD-10-CM

## 2021-08-27 DIAGNOSIS — Z96.60 PRESENCE OF UNSPECIFIED ORTHOPEDIC JOINT IMPLANT: Chronic | ICD-10-CM

## 2021-08-27 DIAGNOSIS — Z95.810 PRESENCE OF AUTOMATIC (IMPLANTABLE) CARDIAC DEFIBRILLATOR: Chronic | ICD-10-CM

## 2021-08-27 DIAGNOSIS — Z98.89 OTHER SPECIFIED POSTPROCEDURAL STATES: Chronic | ICD-10-CM

## 2021-08-27 DIAGNOSIS — R22.32 LOCALIZED SWELLING, MASS AND LUMP, LEFT UPPER LIMB: ICD-10-CM

## 2021-08-27 DIAGNOSIS — L89.894 PRESSURE ULCER OF OTHER SITE, STAGE 4: ICD-10-CM

## 2021-08-27 PROCEDURE — 99213 OFFICE O/P EST LOW 20 MIN: CPT

## 2021-08-27 PROCEDURE — G0463: CPT

## 2021-08-27 NOTE — PLAN
[FreeTextEntry1] : bone scan showed no osteomyelitis right great toe but localized skin infection\par medihoney started with DD \par xray of left elbow.showed age unknown fx stressed he should see orthopedist\par f/u 1 wks.to see Dr López\par 25 minutes spent in review,evaluation and treatment planning

## 2021-08-27 NOTE — PHYSICAL EXAM
[2 x 2] : 2 x 2  [Normal Thyroid] : the thyroid was normal [Normal Breath Sounds] : Normal breath sounds [Normal Heart Sounds] : normal heart sounds [Normal Rate and Rhythm] : normal rate and rhythm [Alert] : alert [Oriented to Person] : oriented to person [Oriented to Place] : oriented to place [Oriented to Time] : oriented to time [Calm] : calm [JVD] : no jugular venous distention  [Abdomen Masses] : No abdominal massess [Abdomen Tenderness] : ~T ~M No abdominal tenderness [Tender] : nontender [Enlarged] : not enlarged [de-identified] : elderly WM, NAD, alert, Ox3. [FreeTextEntry1] : Elbow-mass under skin- no open wounds [de-identified] : Cleansed with Normal Saline.  [de-identified] : No Treatment  [FreeTextEntry7] : Hallux [FreeTextEntry8] : 0.6 [FreeTextEntry9] : 0.7 [de-identified] : 0.2 [de-identified] : with mild maceration  [de-identified] : 15% [de-identified] : 85% [de-identified] : Medihoney  [de-identified] : Cleansed with Normal saline\par  [TWNoteComboBox1] : Left [TWNoteComboBox4] : None [TWNoteComboBox9] : Right [de-identified] : Small [de-identified] : No [de-identified] : Traumatic [de-identified] : No [de-identified] : Normal [de-identified] : None [de-identified] : None [de-identified] : Yes [de-identified] : 3x Weekly [de-identified] : Primary Dressing

## 2021-08-27 NOTE — VITALS
[Pain related to present condition?] : The patient's  pain is not related to present condition. [] : No [de-identified] : 0

## 2021-08-27 NOTE — ASSESSMENT
[Verbal] : Verbal [Patient] : Patient [Family member] : Family member [Good - alert, interested, motivated] : Good - alert, interested, motivated [Verbalizes knowledge/Understanding] : Verbalizes knowledge/understanding [Foot Care] : foot care [Skin Care] : skin care [Signs and symptoms of infection] : sign and symptoms of infection [How and When to Call] : how and when to call [Labs and Tests] : labs and tests [Off-loading] : off-loading [Patient responsibility to plan of care] : patient responsibility to plan of care [] : Yes [Stable] : stable [Home] : Home [Cane] : Cane [FreeTextEntry2] : Promote optimal skin integrity\par Offloading\par Infection prevention\par Enzymatic debridement\par F/U 1 week \par  [FreeTextEntry4] : MD reviewed xray and bone scan results with the patient. The patient verbalized understanding of results. \par Patient was provided with contact information for podiatrist- Dr. Neumann\par MD escribed [facility]\morro F/U 1 week

## 2021-08-27 NOTE — REVIEW OF SYSTEMS
[Skin Wound] : skin wound [FreeTextEntry3] : glaucoma, macular degeneration [FreeTextEntry5] : cardiac defibrillator, htn, hld [FreeTextEntry9] : bilateral hammertoes [de-identified] : right hallux dorsal IPJ ulcer down to skin, subcutaneous tissue, fat, bone [de-identified] : lumbar radiculopathy, sciatica, spinal stenosis [de-identified] : pancreatic cancer

## 2021-08-27 NOTE — HISTORY OF PRESENT ILLNESS
[FreeTextEntry1] : 79 yo WM, here with his wife for a podiatry appt with Dr. López. Pt also mentioned that he had a lump/mass on his left elbow. Pt stated he banged his left elbow about 3 mos ago, cutting the area, causing bleeding. Was seen in a urgent care center. The wound eventually healed on its own, but a nontender mass/lump still present in the left area. Laceration scars seen in the left elbow and findings c/w with a seroma. Nontender/no erythema or any evidence of an abscess. Advised a xray of the area and to f/u with an ortho surgeon.\par \par 8/27/21 right hallux with small amount of necrotic tissue. No erythema and no drainage.. Patient to see Dr López

## 2021-08-30 DIAGNOSIS — Z85.07 PERSONAL HISTORY OF MALIGNANT NEOPLASM OF PANCREAS: ICD-10-CM

## 2021-08-30 DIAGNOSIS — I10 ESSENTIAL (PRIMARY) HYPERTENSION: ICD-10-CM

## 2021-08-30 DIAGNOSIS — Z95.4 PRESENCE OF OTHER HEART-VALVE REPLACEMENT: ICD-10-CM

## 2021-08-30 DIAGNOSIS — Z79.899 OTHER LONG TERM (CURRENT) DRUG THERAPY: ICD-10-CM

## 2021-08-30 DIAGNOSIS — M48.00 SPINAL STENOSIS, SITE UNSPECIFIED: ICD-10-CM

## 2021-08-30 DIAGNOSIS — Z79.51 LONG TERM (CURRENT) USE OF INHALED STEROIDS: ICD-10-CM

## 2021-08-30 DIAGNOSIS — H35.30 UNSPECIFIED MACULAR DEGENERATION: ICD-10-CM

## 2021-08-30 DIAGNOSIS — M20.42 OTHER HAMMER TOE(S) (ACQUIRED), LEFT FOOT: ICD-10-CM

## 2021-08-30 DIAGNOSIS — L89.894 PRESSURE ULCER OF OTHER SITE, STAGE 4: ICD-10-CM

## 2021-08-30 DIAGNOSIS — E78.00 PURE HYPERCHOLESTEROLEMIA, UNSPECIFIED: ICD-10-CM

## 2021-08-30 DIAGNOSIS — Z79.2 LONG TERM (CURRENT) USE OF ANTIBIOTICS: ICD-10-CM

## 2021-08-30 DIAGNOSIS — Z98.890 OTHER SPECIFIED POSTPROCEDURAL STATES: ICD-10-CM

## 2021-08-30 DIAGNOSIS — Z95.810 PRESENCE OF AUTOMATIC (IMPLANTABLE) CARDIAC DEFIBRILLATOR: ICD-10-CM

## 2021-08-30 DIAGNOSIS — M20.41 OTHER HAMMER TOE(S) (ACQUIRED), RIGHT FOOT: ICD-10-CM

## 2021-08-30 DIAGNOSIS — Z87.81 PERSONAL HISTORY OF (HEALED) TRAUMATIC FRACTURE: ICD-10-CM

## 2021-08-30 DIAGNOSIS — Z96.649 PRESENCE OF UNSPECIFIED ARTIFICIAL HIP JOINT: ICD-10-CM

## 2021-08-30 DIAGNOSIS — H40.9 UNSPECIFIED GLAUCOMA: ICD-10-CM

## 2021-08-30 DIAGNOSIS — R22.32 LOCALIZED SWELLING, MASS AND LUMP, LEFT UPPER LIMB: ICD-10-CM

## 2021-08-30 DIAGNOSIS — Z79.01 LONG TERM (CURRENT) USE OF ANTICOAGULANTS: ICD-10-CM

## 2021-09-03 ENCOUNTER — OUTPATIENT (OUTPATIENT)
Dept: OUTPATIENT SERVICES | Facility: HOSPITAL | Age: 81
LOS: 1 days | Discharge: HOME | End: 2021-09-03
Payer: MEDICARE

## 2021-09-03 ENCOUNTER — APPOINTMENT (OUTPATIENT)
Dept: WOUND CARE | Facility: HOSPITAL | Age: 81
End: 2021-09-03
Payer: MEDICARE

## 2021-09-03 VITALS
RESPIRATION RATE: 20 BRPM | DIASTOLIC BLOOD PRESSURE: 85 MMHG | TEMPERATURE: 96.8 F | HEART RATE: 71 BPM | OXYGEN SATURATION: 100 % | BODY MASS INDEX: 27.83 KG/M2 | WEIGHT: 210 LBS | HEIGHT: 73 IN | SYSTOLIC BLOOD PRESSURE: 146 MMHG

## 2021-09-03 DIAGNOSIS — Z95.810 PRESENCE OF AUTOMATIC (IMPLANTABLE) CARDIAC DEFIBRILLATOR: Chronic | ICD-10-CM

## 2021-09-03 DIAGNOSIS — L89.894 PRESSURE ULCER OF OTHER SITE, STAGE 4: ICD-10-CM

## 2021-09-03 DIAGNOSIS — Z96.649 PRESENCE OF UNSPECIFIED ARTIFICIAL HIP JOINT: Chronic | ICD-10-CM

## 2021-09-03 DIAGNOSIS — Z96.60 PRESENCE OF UNSPECIFIED ORTHOPEDIC JOINT IMPLANT: Chronic | ICD-10-CM

## 2021-09-03 DIAGNOSIS — D29.20 BENIGN NEOPLASM OF UNSPECIFIED TESTIS: Chronic | ICD-10-CM

## 2021-09-03 DIAGNOSIS — Z98.89 OTHER SPECIFIED POSTPROCEDURAL STATES: Chronic | ICD-10-CM

## 2021-09-03 PROCEDURE — G0463: CPT

## 2021-09-03 PROCEDURE — 99213 OFFICE O/P EST LOW 20 MIN: CPT

## 2021-09-07 DIAGNOSIS — Z85.07 PERSONAL HISTORY OF MALIGNANT NEOPLASM OF PANCREAS: ICD-10-CM

## 2021-09-07 DIAGNOSIS — I10 ESSENTIAL (PRIMARY) HYPERTENSION: ICD-10-CM

## 2021-09-07 DIAGNOSIS — L89.894 PRESSURE ULCER OF OTHER SITE, STAGE 4: ICD-10-CM

## 2021-09-07 DIAGNOSIS — Z79.51 LONG TERM (CURRENT) USE OF INHALED STEROIDS: ICD-10-CM

## 2021-09-07 DIAGNOSIS — Z79.01 LONG TERM (CURRENT) USE OF ANTICOAGULANTS: ICD-10-CM

## 2021-09-07 DIAGNOSIS — Z87.81 PERSONAL HISTORY OF (HEALED) TRAUMATIC FRACTURE: ICD-10-CM

## 2021-09-07 DIAGNOSIS — Z95.810 PRESENCE OF AUTOMATIC (IMPLANTABLE) CARDIAC DEFIBRILLATOR: ICD-10-CM

## 2021-09-07 DIAGNOSIS — M48.00 SPINAL STENOSIS, SITE UNSPECIFIED: ICD-10-CM

## 2021-09-07 DIAGNOSIS — H35.30 UNSPECIFIED MACULAR DEGENERATION: ICD-10-CM

## 2021-09-07 DIAGNOSIS — R22.32 LOCALIZED SWELLING, MASS AND LUMP, LEFT UPPER LIMB: ICD-10-CM

## 2021-09-07 DIAGNOSIS — Z95.4 PRESENCE OF OTHER HEART-VALVE REPLACEMENT: ICD-10-CM

## 2021-09-07 DIAGNOSIS — Z79.2 LONG TERM (CURRENT) USE OF ANTIBIOTICS: ICD-10-CM

## 2021-09-07 DIAGNOSIS — Z98.890 OTHER SPECIFIED POSTPROCEDURAL STATES: ICD-10-CM

## 2021-09-07 DIAGNOSIS — M20.41 OTHER HAMMER TOE(S) (ACQUIRED), RIGHT FOOT: ICD-10-CM

## 2021-09-07 DIAGNOSIS — Z96.649 PRESENCE OF UNSPECIFIED ARTIFICIAL HIP JOINT: ICD-10-CM

## 2021-09-07 DIAGNOSIS — Z79.899 OTHER LONG TERM (CURRENT) DRUG THERAPY: ICD-10-CM

## 2021-09-07 DIAGNOSIS — M20.42 OTHER HAMMER TOE(S) (ACQUIRED), LEFT FOOT: ICD-10-CM

## 2021-09-07 DIAGNOSIS — H40.9 UNSPECIFIED GLAUCOMA: ICD-10-CM

## 2021-09-07 DIAGNOSIS — E78.00 PURE HYPERCHOLESTEROLEMIA, UNSPECIFIED: ICD-10-CM

## 2021-09-07 NOTE — REVIEW OF SYSTEMS
[Fever] : no fever [Chills] : no chills [Eye Pain] : no eye pain [Shortness Of Breath] : no shortness of breath [Abdominal Pain] : no abdominal pain [Vomiting] : no vomiting [Skin Wound] : skin wound [FreeTextEntry3] : glaucoma, macular degeneration [FreeTextEntry5] : cardiac defibrillator, htn, hld [FreeTextEntry9] : bilateral hammertoes [de-identified] : right hallux dorsal IPJ ulcer down to skin, subcutaneous tissue, fat, bone [de-identified] : lumbar radiculopathy, sciatica, spinal stenosis [de-identified] : pancreatic cancer

## 2021-09-07 NOTE — PHYSICAL EXAM
[2 x 2] : 2 x 2  [2+] : left 2+ [Ankle Swelling (On Exam)] : not present [Varicose Veins Of Lower Extremities] : not present [] : not present [Skin Ulcer] : ulcer [de-identified] : A&Ox3, NAD [de-identified] : HTN, HLD , pacemaker  [de-identified] : Bilateral hammertoes [de-identified] : right hallux dorsal IPJ ulcer down to skin, subcutaneous tissue, fat, bone [de-identified] : diminished light touch sensation bilaterally [FreeTextEntry1] : Elbow-mass under skin- no open wounds [de-identified] : Cleansed with Normal Saline.  [de-identified] : No Treatment  [FreeTextEntry7] : Hallux [FreeTextEntry9] : 0.7 [FreeTextEntry8] : 0.6 [de-identified] : 0.2 [de-identified] : with mild maceration  [de-identified] : Allevyn Ag [de-identified] : Cleansed with Normal saline\par Cloth Tape [TWNoteComboBox1] : Left [TWNoteComboBox4] : None [TWNoteComboBox9] : Right [de-identified] : Small [de-identified] : No [de-identified] : Traumatic [de-identified] : No [de-identified] : Normal [de-identified] : None [de-identified] : None [de-identified] : 100% [de-identified] : No [de-identified] : 3x Weekly [de-identified] : Primary Dressing

## 2021-09-07 NOTE — PLAN
[FreeTextEntry1] : Patient awaiting pancreatic treatment . patient high risk for surgical correction of the deformed hallux . At this time palliative care , off loading of the ulcer  Spent 20 minutes for patient care and medical decision making.\par

## 2021-09-07 NOTE — HISTORY OF PRESENT ILLNESS
[FreeTextEntry1] : Hammer  toe of the right hallux with dorsal pressure , stage 3 , skin, fat , subcutaneous  , no soi

## 2021-09-07 NOTE — ASSESSMENT
[Verbal] : Verbal [Patient] : Patient [Family member] : Family member [Verbalizes knowledge/Understanding] : Verbalizes knowledge/understanding [Good - alert, interested, motivated] : Good - alert, interested, motivated [Foot Care] : foot care [Skin Care] : skin care [Signs and symptoms of infection] : sign and symptoms of infection [How and When to Call] : how and when to call [Off-loading] : off-loading [Labs and Tests] : labs and tests [Patient responsibility to plan of care] : patient responsibility to plan of care [] : Yes [Stable] : stable [Home] : Home [Cane] : Cane [FreeTextEntry2] : Promote optimal skin integrity\par Offloading\par Infection prevention\par Enzymatic debridement\par F/U 1 week \par  [FreeTextEntry4] : F/U 1 week

## 2021-09-10 ENCOUNTER — APPOINTMENT (OUTPATIENT)
Dept: WOUND CARE | Facility: HOSPITAL | Age: 81
End: 2021-09-10
Payer: MEDICARE

## 2021-09-10 ENCOUNTER — OUTPATIENT (OUTPATIENT)
Dept: OUTPATIENT SERVICES | Facility: HOSPITAL | Age: 81
LOS: 1 days | Discharge: HOME | End: 2021-09-10
Payer: MEDICARE

## 2021-09-10 VITALS
SYSTOLIC BLOOD PRESSURE: 105 MMHG | TEMPERATURE: 96.9 F | HEIGHT: 73 IN | BODY MASS INDEX: 27.83 KG/M2 | HEART RATE: 73 BPM | OXYGEN SATURATION: 100 % | RESPIRATION RATE: 20 BRPM | DIASTOLIC BLOOD PRESSURE: 55 MMHG | WEIGHT: 210 LBS

## 2021-09-10 DIAGNOSIS — Z95.810 PRESENCE OF AUTOMATIC (IMPLANTABLE) CARDIAC DEFIBRILLATOR: Chronic | ICD-10-CM

## 2021-09-10 DIAGNOSIS — L89.894 PRESSURE ULCER OF OTHER SITE, STAGE 4: ICD-10-CM

## 2021-09-10 DIAGNOSIS — L03.032 CELLULITIS OF LEFT TOE: ICD-10-CM

## 2021-09-10 DIAGNOSIS — Z96.60 PRESENCE OF UNSPECIFIED ORTHOPEDIC JOINT IMPLANT: Chronic | ICD-10-CM

## 2021-09-10 DIAGNOSIS — Z96.649 PRESENCE OF UNSPECIFIED ARTIFICIAL HIP JOINT: Chronic | ICD-10-CM

## 2021-09-10 DIAGNOSIS — D29.20 BENIGN NEOPLASM OF UNSPECIFIED TESTIS: Chronic | ICD-10-CM

## 2021-09-10 DIAGNOSIS — Z98.89 OTHER SPECIFIED POSTPROCEDURAL STATES: Chronic | ICD-10-CM

## 2021-09-10 DIAGNOSIS — L03.031 CELLULITIS OF RIGHT TOE: ICD-10-CM

## 2021-09-10 PROCEDURE — G0463: CPT

## 2021-09-10 PROCEDURE — 99213 OFFICE O/P EST LOW 20 MIN: CPT

## 2021-09-14 ENCOUNTER — INPATIENT (INPATIENT)
Facility: HOSPITAL | Age: 81
LOS: 11 days | Discharge: ANOTHER HOSPITAL | DRG: 474 | End: 2021-09-26
Attending: HOSPITALIST | Admitting: STUDENT IN AN ORGANIZED HEALTH CARE EDUCATION/TRAINING PROGRAM
Payer: MEDICARE

## 2021-09-14 ENCOUNTER — OUTPATIENT (OUTPATIENT)
Dept: OUTPATIENT SERVICES | Facility: HOSPITAL | Age: 81
LOS: 1 days | Discharge: ANOTHER HOSPITAL | End: 2021-09-14

## 2021-09-14 ENCOUNTER — APPOINTMENT (OUTPATIENT)
Dept: WOUND CARE | Facility: HOSPITAL | Age: 81
End: 2021-09-14
Payer: MEDICARE

## 2021-09-14 ENCOUNTER — TRANSCRIPTION ENCOUNTER (OUTPATIENT)
Age: 81
End: 2021-09-14

## 2021-09-14 VITALS
HEART RATE: 91 BPM | WEIGHT: 214.95 LBS | TEMPERATURE: 99 F | HEIGHT: 73 IN | RESPIRATION RATE: 18 BRPM | SYSTOLIC BLOOD PRESSURE: 125 MMHG | OXYGEN SATURATION: 100 % | DIASTOLIC BLOOD PRESSURE: 67 MMHG

## 2021-09-14 VITALS
WEIGHT: 210 LBS | DIASTOLIC BLOOD PRESSURE: 75 MMHG | OXYGEN SATURATION: 100 % | HEIGHT: 73 IN | RESPIRATION RATE: 18 BRPM | BODY MASS INDEX: 27.83 KG/M2 | HEART RATE: 97 BPM | TEMPERATURE: 98.1 F | SYSTOLIC BLOOD PRESSURE: 140 MMHG

## 2021-09-14 VITALS
WEIGHT: 210 LBS | HEIGHT: 73 IN | HEART RATE: 97 BPM | RESPIRATION RATE: 18 BRPM | BODY MASS INDEX: 27.83 KG/M2 | DIASTOLIC BLOOD PRESSURE: 75 MMHG | TEMPERATURE: 98.1 F | OXYGEN SATURATION: 100 % | SYSTOLIC BLOOD PRESSURE: 140 MMHG

## 2021-09-14 DIAGNOSIS — Z96.60 PRESENCE OF UNSPECIFIED ORTHOPEDIC JOINT IMPLANT: Chronic | ICD-10-CM

## 2021-09-14 DIAGNOSIS — Z98.89 OTHER SPECIFIED POSTPROCEDURAL STATES: Chronic | ICD-10-CM

## 2021-09-14 DIAGNOSIS — Z95.810 PRESENCE OF AUTOMATIC (IMPLANTABLE) CARDIAC DEFIBRILLATOR: Chronic | ICD-10-CM

## 2021-09-14 DIAGNOSIS — I83.218 VARICOSE VEINS OF RIGHT LOWER EXTREMITY WITH BOTH ULCER OF OTHER PART OF LOWER EXTREMITY AND INFLAMMATION: ICD-10-CM

## 2021-09-14 DIAGNOSIS — D29.20 BENIGN NEOPLASM OF UNSPECIFIED TESTIS: Chronic | ICD-10-CM

## 2021-09-14 DIAGNOSIS — R60.9 EDEMA, UNSPECIFIED: ICD-10-CM

## 2021-09-14 DIAGNOSIS — Z96.649 PRESENCE OF UNSPECIFIED ARTIFICIAL HIP JOINT: Chronic | ICD-10-CM

## 2021-09-14 DIAGNOSIS — L03.90 CELLULITIS, UNSPECIFIED: ICD-10-CM

## 2021-09-14 DIAGNOSIS — L03.032 CELLULITIS OF LEFT TOE: ICD-10-CM

## 2021-09-14 LAB
ALBUMIN SERPL ELPH-MCNC: 3.2 G/DL — LOW (ref 3.3–5)
ALP SERPL-CCNC: 114 U/L — SIGNIFICANT CHANGE UP (ref 40–120)
ALT FLD-CCNC: 19 U/L — SIGNIFICANT CHANGE UP (ref 12–78)
ANION GAP SERPL CALC-SCNC: 7 MMOL/L — SIGNIFICANT CHANGE UP (ref 5–17)
ANISOCYTOSIS BLD QL: SLIGHT — SIGNIFICANT CHANGE UP
APTT BLD: 42.6 SEC — HIGH (ref 27.5–35.5)
AST SERPL-CCNC: 19 U/L — SIGNIFICANT CHANGE UP (ref 15–37)
BASOPHILS # BLD AUTO: 0 K/UL — SIGNIFICANT CHANGE UP (ref 0–0.2)
BASOPHILS NFR BLD AUTO: 0 % — SIGNIFICANT CHANGE UP (ref 0–2)
BILIRUB DIRECT SERPL-MCNC: 0.6 MG/DL — HIGH (ref 0.05–0.2)
BILIRUB INDIRECT FLD-MCNC: 0.7 MG/DL — SIGNIFICANT CHANGE UP (ref 0.2–1)
BILIRUB SERPL-MCNC: 1.3 MG/DL — HIGH (ref 0.2–1.2)
BUN SERPL-MCNC: 16 MG/DL — SIGNIFICANT CHANGE UP (ref 7–23)
CALCIUM SERPL-MCNC: 8.1 MG/DL — LOW (ref 8.5–10.1)
CHLORIDE SERPL-SCNC: 101 MMOL/L — SIGNIFICANT CHANGE UP (ref 96–108)
CO2 SERPL-SCNC: 24 MMOL/L — SIGNIFICANT CHANGE UP (ref 22–31)
CREAT SERPL-MCNC: 0.66 MG/DL — SIGNIFICANT CHANGE UP (ref 0.5–1.3)
EOSINOPHIL # BLD AUTO: 0 K/UL — SIGNIFICANT CHANGE UP (ref 0–0.5)
EOSINOPHIL NFR BLD AUTO: 0 % — SIGNIFICANT CHANGE UP (ref 0–6)
ERYTHROCYTE [SEDIMENTATION RATE] IN BLOOD: 53 MM/HR — HIGH (ref 0–20)
GLUCOSE SERPL-MCNC: 129 MG/DL — HIGH (ref 70–99)
HCT VFR BLD CALC: 30 % — LOW (ref 39–50)
HGB BLD-MCNC: 9.8 G/DL — LOW (ref 13–17)
INR BLD: 2.82 RATIO — HIGH (ref 0.88–1.16)
LACTATE SERPL-SCNC: 1 MMOL/L — SIGNIFICANT CHANGE UP (ref 0.7–2)
LYMPHOCYTES # BLD AUTO: 0.3 K/UL — LOW (ref 1–3.3)
LYMPHOCYTES # BLD AUTO: 4 % — LOW (ref 13–44)
MACROCYTES BLD QL: SLIGHT — SIGNIFICANT CHANGE UP
MANUAL SMEAR VERIFICATION: SIGNIFICANT CHANGE UP
MCHC RBC-ENTMCNC: 32.7 GM/DL — SIGNIFICANT CHANGE UP (ref 32–36)
MCHC RBC-ENTMCNC: 33.9 PG — SIGNIFICANT CHANGE UP (ref 27–34)
MCV RBC AUTO: 103.8 FL — HIGH (ref 80–100)
MONOCYTES # BLD AUTO: 1.14 K/UL — HIGH (ref 0–0.9)
MONOCYTES NFR BLD AUTO: 15 % — HIGH (ref 2–14)
NEUTROPHILS # BLD AUTO: 5.99 K/UL — SIGNIFICANT CHANGE UP (ref 1.8–7.4)
NEUTROPHILS NFR BLD AUTO: 78 % — HIGH (ref 43–77)
NEUTS BAND # BLD: 1 % — SIGNIFICANT CHANGE UP (ref 0–8)
NRBC # BLD: 0 — SIGNIFICANT CHANGE UP
NRBC # BLD: SIGNIFICANT CHANGE UP /100 WBCS (ref 0–0)
PLAT MORPH BLD: NORMAL — SIGNIFICANT CHANGE UP
PLATELET # BLD AUTO: 94 K/UL — LOW (ref 150–400)
POTASSIUM SERPL-MCNC: 3.9 MMOL/L — SIGNIFICANT CHANGE UP (ref 3.5–5.3)
POTASSIUM SERPL-SCNC: 3.9 MMOL/L — SIGNIFICANT CHANGE UP (ref 3.5–5.3)
PROT SERPL-MCNC: 7.2 G/DL — SIGNIFICANT CHANGE UP (ref 6–8.3)
PROTHROM AB SERPL-ACNC: 31.4 SEC — HIGH (ref 10.6–13.6)
RBC # BLD: 2.89 M/UL — LOW (ref 4.2–5.8)
RBC # FLD: 15.9 % — HIGH (ref 10.3–14.5)
RBC BLD AUTO: SIGNIFICANT CHANGE UP
SODIUM SERPL-SCNC: 132 MMOL/L — LOW (ref 135–145)
VARIANT LYMPHS # BLD: 2 % — SIGNIFICANT CHANGE UP (ref 0–6)
WBC # BLD: 7.58 K/UL — SIGNIFICANT CHANGE UP (ref 3.8–10.5)
WBC # FLD AUTO: 7.58 K/UL — SIGNIFICANT CHANGE UP (ref 3.8–10.5)

## 2021-09-14 PROCEDURE — 73610 X-RAY EXAM OF ANKLE: CPT | Mod: 26,RT

## 2021-09-14 PROCEDURE — 93010 ELECTROCARDIOGRAM REPORT: CPT

## 2021-09-14 PROCEDURE — 73630 X-RAY EXAM OF FOOT: CPT | Mod: 26,RT

## 2021-09-14 PROCEDURE — ZZZZZ: CPT

## 2021-09-14 PROCEDURE — 99223 1ST HOSP IP/OBS HIGH 75: CPT | Mod: GC

## 2021-09-14 PROCEDURE — 73590 X-RAY EXAM OF LOWER LEG: CPT | Mod: 26,RT

## 2021-09-14 PROCEDURE — 99285 EMERGENCY DEPT VISIT HI MDM: CPT

## 2021-09-14 PROCEDURE — 71045 X-RAY EXAM CHEST 1 VIEW: CPT | Mod: 26

## 2021-09-14 RX ORDER — OXYCODONE AND ACETAMINOPHEN 5; 325 MG/1; MG/1
2 TABLET ORAL ONCE
Refills: 0 | Status: DISCONTINUED | OUTPATIENT
Start: 2021-09-14 | End: 2021-09-14

## 2021-09-14 RX ORDER — PIPERACILLIN AND TAZOBACTAM 4; .5 G/20ML; G/20ML
3.38 INJECTION, POWDER, LYOPHILIZED, FOR SOLUTION INTRAVENOUS ONCE
Refills: 0 | Status: COMPLETED | OUTPATIENT
Start: 2021-09-14 | End: 2021-09-14

## 2021-09-14 RX ORDER — VANCOMYCIN HCL 1 G
1000 VIAL (EA) INTRAVENOUS ONCE
Refills: 0 | Status: COMPLETED | OUTPATIENT
Start: 2021-09-14 | End: 2021-09-14

## 2021-09-14 RX ORDER — ACETAMINOPHEN 500 MG
650 TABLET ORAL EVERY 6 HOURS
Refills: 0 | Status: DISCONTINUED | OUTPATIENT
Start: 2021-09-14 | End: 2021-09-15

## 2021-09-14 RX ORDER — SODIUM CHLORIDE 9 MG/ML
2500 INJECTION INTRAMUSCULAR; INTRAVENOUS; SUBCUTANEOUS ONCE
Refills: 0 | Status: COMPLETED | OUTPATIENT
Start: 2021-09-14 | End: 2021-09-14

## 2021-09-14 RX ADMIN — PIPERACILLIN AND TAZOBACTAM 3.38 GRAM(S): 4; .5 INJECTION, POWDER, LYOPHILIZED, FOR SOLUTION INTRAVENOUS at 21:00

## 2021-09-14 RX ADMIN — Medication 250 MILLIGRAM(S): at 21:30

## 2021-09-14 RX ADMIN — OXYCODONE AND ACETAMINOPHEN 2 TABLET(S): 5; 325 TABLET ORAL at 20:42

## 2021-09-14 RX ADMIN — PIPERACILLIN AND TAZOBACTAM 200 GRAM(S): 4; .5 INJECTION, POWDER, LYOPHILIZED, FOR SOLUTION INTRAVENOUS at 20:17

## 2021-09-14 RX ADMIN — Medication 1000 MILLIGRAM(S): at 22:30

## 2021-09-14 RX ADMIN — SODIUM CHLORIDE 2500 MILLILITER(S): 9 INJECTION INTRAMUSCULAR; INTRAVENOUS; SUBCUTANEOUS at 20:17

## 2021-09-14 RX ADMIN — OXYCODONE AND ACETAMINOPHEN 2 TABLET(S): 5; 325 TABLET ORAL at 21:40

## 2021-09-14 NOTE — H&P ADULT - PROBLEM SELECTOR PLAN 5
Chronic, unknown EF  - Admit to F  - Given IV NS 2.5 bolus x1 in ED  - Hold home medications in the setting of soft BP (98/52) on admission - Lasix 20 mg PO qd, Entresto 1 tab BID, Sotalol 40 mg PO BID, Bystolic 5 mg PO qd with hold parameters  - Tolerating RA  - Strict I/Os, daily weights  - Monitor and replace electrolytes Chronic systolic CHF s/p AICD  - Admit to GMF  - Given IV NS 2.5 bolus x1 in ED  - On Lasix 20 mg PO qd, Entresto 1 tab BID, Sotalol 40 mg PO BID, Bystolic 5 mg PO qd with hold parameters  - Tolerating RA  - Strict I/Os, daily weights  - Monitor and replace electrolytes

## 2021-09-14 NOTE — H&P ADULT - HISTORY OF PRESENT ILLNESS
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 Patient states he was advised to go the wound care center by his oncologist at [place] due to increased redness and edema to right lower leg. Pt follows podiatryDr. López for right toe wound. Pt was seen by podiatry, Dr. López at the wound care center and was sent to the ED for further management. Denies fever, chills, chest pain, shortness of breath, abdominal pain, nausea, vomiting.     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 Patient states he was advised to go the wound care center by his oncologist at [place] due to increased redness and edema to right lower leg. Pt follows podiatryDr. López for right toe wound. Pt was seen by podiatry, Dr. López at the wound care center and was sent to the ED for further management. Denies fever, chills, chest pain, shortness of breath, abdominal pain, nausea, vomiting.     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 Patient states he was advised to go the wound care center by his oncologist at [place] due to increased redness and edema to right lower leg. Pt follows podiatry, Dr. Felipe for right toe wound. Pt was seen by podiatry at the wound care center and was sent to the ED for further management. Denies fever, chills, chest pain, shortness of breath, abdominal pain, nausea, vomiting.    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 Patient states he was advised to go the wound care center by his oncologist at [place] due to increased redness and edema to right lower leg. Pt follows podiatry, Dr. Felipe for right toe wound. Pt was seen by podiatry at the wound care center and was sent to the ED for further management. Denies fever, chills, chest pain, shortness of breath, abdominal pain, nausea, vomiting.    In the ED,  Vital Signs T(F): 100.9 HR: 91 BP: 125/67 RR: 18 SpO2: 100%  Labs significant for: ESR 53, H/H 9.8/30, INR 2.82, Na 132, Tbili 1.3  CXR: no acute infiltrates, hardware noted  XR right foot: no gas seen - soft tissue swelling by hallux  EKG: SR at 97bpm, LBBB

## 2021-09-14 NOTE — H&P ADULT - PROBLEM SELECTOR PLAN 2
Pt follows Dr. López for R hallux hammertoe dorsal stage 3 pressure injury  - F/u X-rays R foot, R tibia + fibula, R ankle  - Fall precautions  - Podiatry (Dr. López) consulted, f/u recs Pt follows Dr. Felipe/Rene for R hallux hammertoe dorsal stage 3 pressure injury  - F/u X-rays R foot, R tibia + fibula, R ankle  - Fall precautions  - Podiatry (Dr. López) consulted, f/u recs

## 2021-09-14 NOTE — ED PROVIDER NOTE - NSICDXPASTMEDICALHX_GEN_ALL_CORE_FT
PAST MEDICAL HISTORY:  Cardiomyopathy     GERD (gastroesophageal reflux disease)     HLD (hyperlipidemia)     HTN (hypertension)

## 2021-09-14 NOTE — ED ADULT NURSE NOTE - ED STAT RN HANDOFF DETAILS 2
Bedside report given to on coming nurse Jocelyn. Understands pmh, medications given and plan of care for patient. Patient in stable condition, vital signs updated, has no complaints at this time and has been updated on care plan. Explained to patient that it is change of shift and new nurse is taking over, pt verbalized understanding.

## 2021-09-14 NOTE — H&P ADULT - PROBLEM SELECTOR PLAN 1
Patient presents with LLE erythema, edema, and pain likely 2/2 cellulitis  - Admit to GMF  - Given IV Zosyn x1, IV NS 2.5 L bolus in ED  - Continue IV Vancomycin and IV Cefepime  - Tylenol 650 mg PO q6h PRN for mild pain or fever  - Monitor WBC and fever curve  - Wound care, leg elevation  - F/u X-rays R foot, R tibia + fibula, R ankle  - F/u blood cultures, ESR, CRP  - ID (Dr. Bell) consulted, f/u recs  - Podiatry (Dr. López) consulted, f/u recs Patient presents with LLE erythema, edema, and pain likely 2/2 cellulitis  - Admit to GMF  - Given IV Zosyn x1, IV NS 2.5 L bolus in ED  - Continue IV Vancomycin and IV Cefepime  - XR right foot: no gas seen - soft tissue swelling by hallux  - Tylenol 650 mg PO q6h PRN for mild pain or fever  - Monitor WBC and fever curve  - Wound care, leg elevation  - F/u X-rays R foot, R tibia + fibula, R ankle  - F/u blood cultures, ESR, CRP  - ID (Dr. Bell) consulted, f/u recs  - Podiatry (Dr. López) consulted, f/u recs

## 2021-09-14 NOTE — H&P ADULT - NSHPPHYSICALEXAM_GEN_ALL_CORE
T(C): 38.3 (09-14-21 @ 19:55), Max: 38.3 (09-14-21 @ 19:55)  HR: 91 (09-14-21 @ 18:50) (91 - 91)  BP: 125/67 (09-14-21 @ 18:50) (125/67 - 125/67)  RR: 18 (09-14-21 @ 18:50) (18 - 18)  SpO2: 100% (09-14-21 @ 18:50) (100% - 100%)    General: No apparent distress  Head: normocephalic, atraumatic  Eyes: EOMI, anicteric  ENT: moist mucous membranes, no pharyngeal exudates  Heart: rrr, S1, S2, no murmurs  Chest: CTA b/l, no rales, rhonchi, or wheezes  Abd: BS+, soft, NT, ND  Back: no CVA tenderness  Extr: no edema or cyanosis  Neuro: AA&Ox3, no focal weakness, sensation to light touch intact  Psych: normal affect T(C): 38.3 (09-14-21 @ 19:55), Max: 38.3 (09-14-21 @ 19:55)  HR: 91 (09-14-21 @ 18:50) (91 - 91)  BP: 125/67 (09-14-21 @ 18:50) (125/67 - 125/67)  RR: 18 (09-14-21 @ 18:50) (18 - 18)  SpO2: 100% (09-14-21 @ 18:50) (100% - 100%)    General: No apparent distress  Head: normocephalic, atraumatic  Eyes: EOMI, anicteric  ENT: moist mucous membranes, no pharyngeal exudates  Heart: rrr, S1, S2, no murmurs  Chest: CTA b/l, no rales, rhonchi, or wheezes  Abd: BS+, soft, NT, ND  Back: no CVA tenderness  Extr: RLE erythema, edema noted from ankle extending to below R knee, R hallux dorsal ulcer  Neuro: AA&Ox3, no focal weakness, sensation to light touch intact  Psych: normal affect

## 2021-09-14 NOTE — ED PROVIDER NOTE - NSICDXPASTSURGICALHX_GEN_ALL_CORE_FT
PAST SURGICAL HISTORY:  Benign testicular tumor     History of hip replacement     History of laminectomy     History of total hip replacement left    ICD (implantable cardiac defibrillator) in place

## 2021-09-14 NOTE — H&P ADULT - PROBLEM SELECTOR PLAN 3
Chronic, stable on admission  - Hold home medications in the setting of soft BP (98/52) on admission - Lasix 20 mg PO qd, Entresto 1 tab BID, Sotalol 40 mg PO BID, Bystolic 5 mg PO qd with hold parameters  - Monitor routine hemodynamics Chronic, soft BP noted  - C/w Lasix 20 mg PO qd, Entresto 1 tab BID, Sotalol 40 mg PO BID with hold parameters  - hold Bystolic 5 mg PO qd for now in setting of soft BP  - Monitor routine hemodynamics

## 2021-09-14 NOTE — H&P ADULT - PROBLEM SELECTOR PLAN 4
Bilateral PE in 7/2020  - Continue Xarelto 20 mg PO qd  - Patient is scheduled for Whipple's procedure on 9/23/2021 and was advised to stop Xarelto on 9/20 and start Heparin

## 2021-09-14 NOTE — ED PROVIDER NOTE - NS ED ROS FT
Constitutional: +fever.  Neurological: No headache.  Eyes: No vision changes.   Ears, Nose, Mouth, Throat: No congestion.  Cardiovascular: No chest pain.  Respiratory: No difficulty breathing.  Gastrointestinal: No nausea or vomiting.  Genitourinary: No dysuria.  Musculoskeletal: + back pain.  Integumentary (skin and/or breast): + toe ulcer and leg erythema.

## 2021-09-14 NOTE — H&P ADULT - PROBLEM SELECTOR PLAN 10
VTE ppx: Continue home Xarelto 20 mg PO qd    11. BPH: Continue home Flomax 0.4 mg PO qhs  12. Macular degeneration: Continue home Dorzolamide/Timolol, Ketorolac, Latanoprost  13. GERD: Continue therapeutic interchange for home Omeprazole 20 mg PO qd - Protonix 40 mg PO qd  14. Neuropathy: Continue home Gabapentin 300 mg PO BID    IMPROVE VTE Individual Risk Assessment        RISK                                                          Points  [ x ] Previous VTE                                                3  [  ] Thrombophilia                                             2  [  ] Lower limb paralysis                                   2        (unable to hold up >15 seconds)    [ x ] Current Cancer                                             2         (within 6 months)  [  ] Immobilization > 24 hrs                              1  [  ] ICU/CCU stay > 24 hours                             1  [ x ] Age > 60                                                         1  IMPROVE VTE Score: 6 VTE ppx: Continue home Xarelto 20 mg PO qd    11. Hyponatremia: Na 132 on admission, f/u AM BMP  12. BPH: Continue home Flomax 0.4 mg PO qhs  13. Macular degeneration: Continue home Dorzolamide/Timolol, Ketorolac, Latanoprost  14. GERD: Continue therapeutic interchange for home Omeprazole 20 mg PO qd - Protonix 40 mg PO qd  15. Neuropathy: Continue home Gabapentin 300 mg PO BID    IMPROVE VTE Individual Risk Assessment        RISK                                                          Points  [ x ] Previous VTE                                                3  [  ] Thrombophilia                                             2  [  ] Lower limb paralysis                                   2        (unable to hold up >15 seconds)    [ x ] Current Cancer                                             2         (within 6 months)  [  ] Immobilization > 24 hrs                              1  [  ] ICU/CCU stay > 24 hours                             1  [ x ] Age > 60                                                         1  IMPROVE VTE Score: 6

## 2021-09-14 NOTE — ED PROVIDER NOTE - PHYSICAL EXAMINATION
Vital signs as available reviewed.  General:  Comfortable, no acute distress.  Head:  Normocephalic, atraumatic.  Eyes:  Conjunctiva pink, no icterus.  Cardiovascular:  Regular rate, no obvious murmur.  Respiratory:  bilateral lower lung crackles.  Abdomen:  Soft, non-tender.  Musculoskeletal:  right 1st toe with ulcer and erythema / swelling extending to knee.  Neurologic: Alert and oriented, moving all extremities.  Skin:  Warm and dry.

## 2021-09-14 NOTE — H&P ADULT - NSHPSOCIALHISTORY_GEN_ALL_CORE
Tobacco: denies  EtOH: occasional  Recreational drug use: denies  Lives: alone  Ambulates: cane  ADLs: independent  Vaccinations: [employer] x2 2/2021

## 2021-09-14 NOTE — ED PROVIDER NOTE - CLINICAL SUMMARY MEDICAL DECISION MAKING FREE TEXT BOX
here with foot / leg cellulitis from long standing toe ulcer. plan for infectious work up and IV antibiotics and admission.

## 2021-09-14 NOTE — H&P ADULT - ATTENDING COMMENTS
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    admit to medicine  continue with IV vancomycin and cefepime, f/u culture data  BP noted to be soft - closely monitor - hold BP meds if hypotensive  Podiatry Dr. Rene eastman  will consult ID Dr. Bell  d/w patient at bedside    Agree with H&P as outlined above, edited where appropriate. 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    admit to medicine  continue with IV vancomycin and cefepime, f/u culture data  BP noted to be soft - closely monitor - hold BP meds if hypotensive  Podiatry Dr. Rene eastman  will consult ID Dr. Bell  d/w patient at bedside    Agree with H&P as outlined above, edited where appropriate.

## 2021-09-14 NOTE — ED ADULT NURSE NOTE - NS PRO PASSIVE SMOKE EXP
"Problem: Depressive Symptoms  Goal: Depressive Symptoms  Signs and symptoms of listed problems will be absent or manageable.  Outcome: Improving  Pt presents as smiling and reports feeling \"sad.\" Pt reports some sadness but denies any suicidal thoughts. Pt continues to say that she doesn't think she needs treatment after leaving hospital but states she will do it because she has to. Pt was socially appropriate with peers and staff. Pt had no behavioral issues.      " No

## 2021-09-14 NOTE — H&P ADULT - NSHPREVIEWOFSYSTEMS_GEN_ALL_CORE
General: no fever, chills  Eyes: no vision changes  ENT: no hearing changes, nasal congestion, or sore throat  CV: no chest pain or palpitations  Pulm: no SOB, wheezing, cough, or hemoptysis  Abd/GI: no nausea, vomiting, diarrhea, constipation, abd pain  : no dysuria, hematuria, urinary frequency  MSK: no joint pain or myalgias  Neuro: no syncope, dizziness, focal weakness  Psych: no anxiety or depression  Endo: no heat or cold intolerance General: no fever, chills  Eyes: no vision changes  ENT: no hearing changes, nasal congestion, or sore throat  CV: no chest pain or palpitations  Pulm: no SOB, wheezing, cough, or hemoptysis  Abd/GI: no nausea, vomiting, diarrhea, constipation, abd pain  : no dysuria, hematuria, urinary frequency  MSK: admits R leg swelling and redness, R toe wound  Neuro: no syncope, dizziness, focal weakness  Psych: no anxiety or depression  Endo: no heat or cold intolerance

## 2021-09-14 NOTE — H&P ADULT - NSICDXFAMILYHX_GEN_ALL_CORE_FT
FAMILY HISTORY:  Mother  Still living? No  Family history of stroke, Age at diagnosis: Age Unknown

## 2021-09-14 NOTE — ED ADULT NURSE NOTE - OBJECTIVE STATEMENT
pt sent from wound care for a non healing wound on his left great toe.  cellulitis vs osteomyelitis  pt has been a patient at the wound care center for months treating this ulcer.  +edema and ascending erythema noted.

## 2021-09-14 NOTE — ED ADULT NURSE NOTE - NSIMPLEMENTINTERV_GEN_ALL_ED
Implemented All Fall Risk Interventions:  [place] to call system. Call bell, personal items and telephone within reach. Instruct patient to call for assistance. Room bathroom lighting operational. Non-slip footwear when patient is off stretcher. Physically safe environment: no spills, clutter or unnecessary equipment. Stretcher in lowest position, wheels locked, appropriate side rails in place. Provide visual cue, wrist band, yellow gown, etc. Monitor gait and stability. Monitor for mental status changes and reorient to person, place, and time. Review medications for side effects contributing to fall risk. Reinforce activity limits and safety measures with patient and family.

## 2021-09-14 NOTE — H&P ADULT - PROBLEM SELECTOR PLAN 7
Likely anemia of chronic disease in the setting of pancreatic cancer  - H/H 9.8/30.0 on admission, baseline hemoglobin unknown  - Currently hemodynamically stable, monitor for signs and symptoms of active bleeding  - Consider transfusion for hemoglobin <8 if  - Maintain active type and screen  - F/u iron panel: iron, ferritin, TIBC, transferrin  - F/u folate, vitamin B12  - F/u AM CBC Likely anemia of chronic disease in the setting of pancreatic cancer  - H/H 9.8/30.0 on admission, baseline hemoglobin unknown  - Currently hemodynamically stable, monitor for signs and symptoms of active bleeding  - Consider transfusion for hemoglobin <8  - Maintain active type and screen  - F/u iron panel: iron, ferritin, TIBC, transferrin  - F/u folate, vitamin B12  - F/u AM CBC

## 2021-09-14 NOTE — ED PROVIDER NOTE - OBJECTIVE STATEMENT
80 M history of pancreatic CA, hypertension, hyperlipidemia, s/p defibrillator sent in for admission by wound care clinic. family reports long standing right great toe ulcer with new rapidly progressive erythema associated with fevers, chills, weakness. patient seen by Dr. López in the wound care clinic and sent to ED for admission / IV antibiotics.

## 2021-09-14 NOTE — H&P ADULT - ASSESSMENT
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

## 2021-09-14 NOTE — H&P ADULT - NSHPOUTPATIENTPROVIDERS_GEN_ALL_CORE
PMD Dr. Manoj Perez  Podiatry Dr. Brian López PMD Dr. Manoj Perez  Podiatry Dr. Brian López  Oncologist: Dr. Luciano Mosley PMD Dr. Manoj Perez  Podiatry Dr. Kingsley Felipe  Oncologist: Dr. Luciano Mosley

## 2021-09-14 NOTE — H&P ADULT - PROBLEM SELECTOR PLAN 8
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

## 2021-09-15 ENCOUNTER — RESULT REVIEW (OUTPATIENT)
Age: 81
End: 2021-09-15

## 2021-09-15 DIAGNOSIS — D64.9 ANEMIA, UNSPECIFIED: ICD-10-CM

## 2021-09-15 DIAGNOSIS — Z79.01 LONG TERM (CURRENT) USE OF ANTICOAGULANTS: ICD-10-CM

## 2021-09-15 DIAGNOSIS — N40.0 BENIGN PROSTATIC HYPERPLASIA WITHOUT LOWER URINARY TRACT SYMPTOMS: ICD-10-CM

## 2021-09-15 DIAGNOSIS — L03.032 CELLULITIS OF LEFT TOE: ICD-10-CM

## 2021-09-15 DIAGNOSIS — F41.9 ANXIETY DISORDER, UNSPECIFIED: ICD-10-CM

## 2021-09-15 DIAGNOSIS — M20.42 OTHER HAMMER TOE(S) (ACQUIRED), LEFT FOOT: ICD-10-CM

## 2021-09-15 DIAGNOSIS — H35.30 UNSPECIFIED MACULAR DEGENERATION: ICD-10-CM

## 2021-09-15 DIAGNOSIS — Z29.9 ENCOUNTER FOR PROPHYLACTIC MEASURES, UNSPECIFIED: ICD-10-CM

## 2021-09-15 DIAGNOSIS — S99.921A UNSPECIFIED INJURY OF RIGHT FOOT, INITIAL ENCOUNTER: ICD-10-CM

## 2021-09-15 DIAGNOSIS — Z95.810 PRESENCE OF AUTOMATIC (IMPLANTABLE) CARDIAC DEFIBRILLATOR: ICD-10-CM

## 2021-09-15 DIAGNOSIS — L08.9 LOCAL INFECTION OF THE SKIN AND SUBCUTANEOUS TISSUE, UNSPECIFIED: ICD-10-CM

## 2021-09-15 DIAGNOSIS — I10 ESSENTIAL (PRIMARY) HYPERTENSION: ICD-10-CM

## 2021-09-15 DIAGNOSIS — I25.10 ATHEROSCLEROTIC HEART DISEASE OF NATIVE CORONARY ARTERY WITHOUT ANGINA PECTORIS: ICD-10-CM

## 2021-09-15 DIAGNOSIS — E78.00 PURE HYPERCHOLESTEROLEMIA, UNSPECIFIED: ICD-10-CM

## 2021-09-15 DIAGNOSIS — I26.99 OTHER PULMONARY EMBOLISM WITHOUT ACUTE COR PULMONALE: ICD-10-CM

## 2021-09-15 DIAGNOSIS — Z95.4 PRESENCE OF OTHER HEART-VALVE REPLACEMENT: ICD-10-CM

## 2021-09-15 DIAGNOSIS — Z79.899 OTHER LONG TERM (CURRENT) DRUG THERAPY: ICD-10-CM

## 2021-09-15 DIAGNOSIS — Z85.07 PERSONAL HISTORY OF MALIGNANT NEOPLASM OF PANCREAS: ICD-10-CM

## 2021-09-15 DIAGNOSIS — M20.41 OTHER HAMMER TOE(S) (ACQUIRED), RIGHT FOOT: ICD-10-CM

## 2021-09-15 DIAGNOSIS — Z79.2 LONG TERM (CURRENT) USE OF ANTIBIOTICS: ICD-10-CM

## 2021-09-15 DIAGNOSIS — M86.9 OSTEOMYELITIS, UNSPECIFIED: ICD-10-CM

## 2021-09-15 DIAGNOSIS — Z95.2 PRESENCE OF PROSTHETIC HEART VALVE: ICD-10-CM

## 2021-09-15 DIAGNOSIS — Z98.890 OTHER SPECIFIED POSTPROCEDURAL STATES: ICD-10-CM

## 2021-09-15 DIAGNOSIS — L03.115 CELLULITIS OF RIGHT LOWER LIMB: ICD-10-CM

## 2021-09-15 DIAGNOSIS — I50.9 HEART FAILURE, UNSPECIFIED: ICD-10-CM

## 2021-09-15 DIAGNOSIS — Z87.81 PERSONAL HISTORY OF (HEALED) TRAUMATIC FRACTURE: ICD-10-CM

## 2021-09-15 DIAGNOSIS — M48.00 SPINAL STENOSIS, SITE UNSPECIFIED: ICD-10-CM

## 2021-09-15 DIAGNOSIS — Z96.649 PRESENCE OF UNSPECIFIED ARTIFICIAL HIP JOINT: ICD-10-CM

## 2021-09-15 DIAGNOSIS — H40.9 UNSPECIFIED GLAUCOMA: ICD-10-CM

## 2021-09-15 DIAGNOSIS — C25.9 MALIGNANT NEOPLASM OF PANCREAS, UNSPECIFIED: ICD-10-CM

## 2021-09-15 DIAGNOSIS — Z79.51 LONG TERM (CURRENT) USE OF INHALED STEROIDS: ICD-10-CM

## 2021-09-15 DIAGNOSIS — L03.031 CELLULITIS OF RIGHT TOE: ICD-10-CM

## 2021-09-15 PROBLEM — L89.894: Status: ACTIVE | Noted: 2021-08-04

## 2021-09-15 LAB
ALBUMIN SERPL ELPH-MCNC: 2.7 G/DL — LOW (ref 3.3–5)
ALP SERPL-CCNC: 101 U/L — SIGNIFICANT CHANGE UP (ref 40–120)
ALT FLD-CCNC: 16 U/L — SIGNIFICANT CHANGE UP (ref 12–78)
ANION GAP SERPL CALC-SCNC: 5 MMOL/L — SIGNIFICANT CHANGE UP (ref 5–17)
APPEARANCE UR: CLEAR — SIGNIFICANT CHANGE UP
AST SERPL-CCNC: 15 U/L — SIGNIFICANT CHANGE UP (ref 15–37)
BASOPHILS # BLD AUTO: 0.01 K/UL — SIGNIFICANT CHANGE UP (ref 0–0.2)
BASOPHILS NFR BLD AUTO: 0.2 % — SIGNIFICANT CHANGE UP (ref 0–2)
BILIRUB SERPL-MCNC: 1 MG/DL — SIGNIFICANT CHANGE UP (ref 0.2–1.2)
BILIRUB UR-MCNC: NEGATIVE — SIGNIFICANT CHANGE UP
BUN SERPL-MCNC: 13 MG/DL — SIGNIFICANT CHANGE UP (ref 7–23)
CALCIUM SERPL-MCNC: 7.8 MG/DL — LOW (ref 8.5–10.1)
CHLORIDE SERPL-SCNC: 106 MMOL/L — SIGNIFICANT CHANGE UP (ref 96–108)
CO2 SERPL-SCNC: 24 MMOL/L — SIGNIFICANT CHANGE UP (ref 22–31)
COLOR SPEC: YELLOW — SIGNIFICANT CHANGE UP
CREAT SERPL-MCNC: 0.6 MG/DL — SIGNIFICANT CHANGE UP (ref 0.5–1.3)
CRP SERPL-MCNC: 168 MG/L — HIGH
DIFF PNL FLD: NEGATIVE — SIGNIFICANT CHANGE UP
EOSINOPHIL # BLD AUTO: 0.01 K/UL — SIGNIFICANT CHANGE UP (ref 0–0.5)
EOSINOPHIL NFR BLD AUTO: 0.2 % — SIGNIFICANT CHANGE UP (ref 0–6)
EPI CELLS # UR: SIGNIFICANT CHANGE UP
FERRITIN SERPL-MCNC: 585 NG/ML — HIGH (ref 30–400)
FOLATE SERPL-MCNC: 13.7 NG/ML — SIGNIFICANT CHANGE UP
GLUCOSE SERPL-MCNC: 103 MG/DL — HIGH (ref 70–99)
GLUCOSE UR QL: NEGATIVE — SIGNIFICANT CHANGE UP
GRAM STN FLD: SIGNIFICANT CHANGE UP
GRAM STN FLD: SIGNIFICANT CHANGE UP
HCT VFR BLD CALC: 27.1 % — LOW (ref 39–50)
HGB BLD-MCNC: 9 G/DL — LOW (ref 13–17)
IMM GRANULOCYTES NFR BLD AUTO: 0.9 % — SIGNIFICANT CHANGE UP (ref 0–1.5)
IRON SATN MFR SERPL: 10 UG/DL — LOW (ref 45–165)
IRON SATN MFR SERPL: 6 % — LOW (ref 16–55)
KETONES UR-MCNC: NEGATIVE — SIGNIFICANT CHANGE UP
LEUKOCYTE ESTERASE UR-ACNC: NEGATIVE — SIGNIFICANT CHANGE UP
LYMPHOCYTES # BLD AUTO: 0.4 K/UL — LOW (ref 1–3.3)
LYMPHOCYTES # BLD AUTO: 7.2 % — LOW (ref 13–44)
MCHC RBC-ENTMCNC: 33.2 GM/DL — SIGNIFICANT CHANGE UP (ref 32–36)
MCHC RBC-ENTMCNC: 34.5 PG — HIGH (ref 27–34)
MCV RBC AUTO: 103.8 FL — HIGH (ref 80–100)
METHOD TYPE: SIGNIFICANT CHANGE UP
MONOCYTES # BLD AUTO: 0.9 K/UL — SIGNIFICANT CHANGE UP (ref 0–0.9)
MONOCYTES NFR BLD AUTO: 16.2 % — HIGH (ref 2–14)
MSSA DNA SPEC QL NAA+PROBE: SIGNIFICANT CHANGE UP
NEUTROPHILS # BLD AUTO: 4.2 K/UL — SIGNIFICANT CHANGE UP (ref 1.8–7.4)
NEUTROPHILS NFR BLD AUTO: 75.3 % — SIGNIFICANT CHANGE UP (ref 43–77)
NITRITE UR-MCNC: NEGATIVE — SIGNIFICANT CHANGE UP
NRBC # BLD: 0 /100 WBCS — SIGNIFICANT CHANGE UP (ref 0–0)
PH UR: 6 — SIGNIFICANT CHANGE UP (ref 5–8)
PLATELET # BLD AUTO: 72 K/UL — LOW (ref 150–400)
POTASSIUM SERPL-MCNC: 3.6 MMOL/L — SIGNIFICANT CHANGE UP (ref 3.5–5.3)
POTASSIUM SERPL-SCNC: 3.6 MMOL/L — SIGNIFICANT CHANGE UP (ref 3.5–5.3)
PROT SERPL-MCNC: 6.4 G/DL — SIGNIFICANT CHANGE UP (ref 6–8.3)
PROT UR-MCNC: 30 MG/DL
RBC # BLD: 2.61 M/UL — LOW (ref 4.2–5.8)
RBC # FLD: 15.9 % — HIGH (ref 10.3–14.5)
RBC CASTS # UR COMP ASSIST: SIGNIFICANT CHANGE UP /HPF (ref 0–4)
SARS-COV-2 RNA SPEC QL NAA+PROBE: SIGNIFICANT CHANGE UP
SODIUM SERPL-SCNC: 135 MMOL/L — SIGNIFICANT CHANGE UP (ref 135–145)
SP GR SPEC: 1.01 — SIGNIFICANT CHANGE UP (ref 1.01–1.02)
SPECIMEN SOURCE: SIGNIFICANT CHANGE UP
SPECIMEN SOURCE: SIGNIFICANT CHANGE UP
TIBC SERPL-MCNC: 162 UG/DL — LOW (ref 220–430)
UIBC SERPL-MCNC: 152 UG/DL — SIGNIFICANT CHANGE UP (ref 110–370)
UROBILINOGEN FLD QL: 1
VIT B12 SERPL-MCNC: >2000 PG/ML — HIGH (ref 232–1245)
WBC # BLD: 5.57 K/UL — SIGNIFICANT CHANGE UP (ref 3.8–10.5)
WBC # FLD AUTO: 5.57 K/UL — SIGNIFICANT CHANGE UP (ref 3.8–10.5)
WBC UR QL: SIGNIFICANT CHANGE UP

## 2021-09-15 PROCEDURE — 99233 SBSQ HOSP IP/OBS HIGH 50: CPT

## 2021-09-15 PROCEDURE — 88311 DECALCIFY TISSUE: CPT | Mod: 26

## 2021-09-15 PROCEDURE — 11044 DBRDMT BONE 1ST 20 SQ CM/<: CPT

## 2021-09-15 PROCEDURE — 99221 1ST HOSP IP/OBS SF/LOW 40: CPT | Mod: 25

## 2021-09-15 PROCEDURE — 99222 1ST HOSP IP/OBS MODERATE 55: CPT

## 2021-09-15 PROCEDURE — 88305 TISSUE EXAM BY PATHOLOGIST: CPT | Mod: 26

## 2021-09-15 RX ORDER — FUROSEMIDE 40 MG
20 TABLET ORAL DAILY
Refills: 0 | Status: DISCONTINUED | OUTPATIENT
Start: 2021-09-15 | End: 2021-09-20

## 2021-09-15 RX ORDER — LATANOPROST 0.05 MG/ML
1 SOLUTION/ DROPS OPHTHALMIC; TOPICAL AT BEDTIME
Refills: 0 | Status: DISCONTINUED | OUTPATIENT
Start: 2021-09-15 | End: 2021-09-20

## 2021-09-15 RX ORDER — CEFEPIME 1 G/1
2000 INJECTION, POWDER, FOR SOLUTION INTRAMUSCULAR; INTRAVENOUS EVERY 8 HOURS
Refills: 0 | Status: DISCONTINUED | OUTPATIENT
Start: 2021-09-15 | End: 2021-09-16

## 2021-09-15 RX ORDER — ACETAMINOPHEN 500 MG
650 TABLET ORAL EVERY 4 HOURS
Refills: 0 | Status: DISCONTINUED | OUTPATIENT
Start: 2021-09-15 | End: 2021-09-15

## 2021-09-15 RX ORDER — SACUBITRIL AND VALSARTAN 24; 26 MG/1; MG/1
1 TABLET, FILM COATED ORAL
Refills: 0 | Status: DISCONTINUED | OUTPATIENT
Start: 2021-09-15 | End: 2021-09-20

## 2021-09-15 RX ORDER — DULOXETINE HYDROCHLORIDE 30 MG/1
60 CAPSULE, DELAYED RELEASE ORAL DAILY
Refills: 0 | Status: DISCONTINUED | OUTPATIENT
Start: 2021-09-15 | End: 2021-09-20

## 2021-09-15 RX ORDER — IBUPROFEN 200 MG
600 TABLET ORAL ONCE
Refills: 0 | Status: COMPLETED | OUTPATIENT
Start: 2021-09-15 | End: 2021-09-15

## 2021-09-15 RX ORDER — SOTALOL HCL 120 MG
40 TABLET ORAL
Refills: 0 | Status: DISCONTINUED | OUTPATIENT
Start: 2021-09-15 | End: 2021-09-20

## 2021-09-15 RX ORDER — LIPASE/PROTEASE/AMYLASE 16-48-48K
2 CAPSULE,DELAYED RELEASE (ENTERIC COATED) ORAL
Refills: 0 | Status: DISCONTINUED | OUTPATIENT
Start: 2021-09-15 | End: 2021-09-20

## 2021-09-15 RX ORDER — TAMSULOSIN HYDROCHLORIDE 0.4 MG/1
0.4 CAPSULE ORAL AT BEDTIME
Refills: 0 | Status: DISCONTINUED | OUTPATIENT
Start: 2021-09-15 | End: 2021-09-20

## 2021-09-15 RX ORDER — RIVAROXABAN 15 MG-20MG
20 KIT ORAL
Refills: 0 | Status: DISCONTINUED | OUTPATIENT
Start: 2021-09-15 | End: 2021-09-18

## 2021-09-15 RX ORDER — OXYCODONE AND ACETAMINOPHEN 5; 325 MG/1; MG/1
1 TABLET ORAL EVERY 6 HOURS
Refills: 0 | Status: DISCONTINUED | OUTPATIENT
Start: 2021-09-15 | End: 2021-09-20

## 2021-09-15 RX ORDER — KETOROLAC TROMETHAMINE 0.5 %
1 DROPS OPHTHALMIC (EYE) DAILY
Refills: 0 | Status: DISCONTINUED | OUTPATIENT
Start: 2021-09-15 | End: 2021-09-20

## 2021-09-15 RX ORDER — IPRATROPIUM/ALBUTEROL SULFATE 18-103MCG
3 AEROSOL WITH ADAPTER (GRAM) INHALATION ONCE
Refills: 0 | Status: COMPLETED | OUTPATIENT
Start: 2021-09-15 | End: 2021-09-15

## 2021-09-15 RX ORDER — VANCOMYCIN HCL 1 G
1250 VIAL (EA) INTRAVENOUS EVERY 12 HOURS
Refills: 0 | Status: DISCONTINUED | OUTPATIENT
Start: 2021-09-15 | End: 2021-09-16

## 2021-09-15 RX ORDER — PANTOPRAZOLE SODIUM 20 MG/1
40 TABLET, DELAYED RELEASE ORAL
Refills: 0 | Status: DISCONTINUED | OUTPATIENT
Start: 2021-09-15 | End: 2021-09-20

## 2021-09-15 RX ORDER — PYRIDOXINE HCL (VITAMIN B6) 100 MG
100 TABLET ORAL DAILY
Refills: 0 | Status: DISCONTINUED | OUTPATIENT
Start: 2021-09-15 | End: 2021-09-20

## 2021-09-15 RX ORDER — RIVAROXABAN 15 MG-20MG
20 KIT ORAL ONCE
Refills: 0 | Status: COMPLETED | OUTPATIENT
Start: 2021-09-15 | End: 2021-09-15

## 2021-09-15 RX ORDER — CHOLECALCIFEROL (VITAMIN D3) 125 MCG
400 CAPSULE ORAL DAILY
Refills: 0 | Status: DISCONTINUED | OUTPATIENT
Start: 2021-09-15 | End: 2021-09-20

## 2021-09-15 RX ORDER — ACETAMINOPHEN 500 MG
650 TABLET ORAL EVERY 6 HOURS
Refills: 0 | Status: DISCONTINUED | OUTPATIENT
Start: 2021-09-15 | End: 2021-09-20

## 2021-09-15 RX ORDER — DORZOLAMIDE HYDROCHLORIDE TIMOLOL MALEATE 20; 5 MG/ML; MG/ML
1 SOLUTION/ DROPS OPHTHALMIC
Refills: 0 | Status: DISCONTINUED | OUTPATIENT
Start: 2021-09-15 | End: 2021-09-20

## 2021-09-15 RX ORDER — LANOLIN ALCOHOL/MO/W.PET/CERES
5 CREAM (GRAM) TOPICAL AT BEDTIME
Refills: 0 | Status: DISCONTINUED | OUTPATIENT
Start: 2021-09-15 | End: 2021-09-20

## 2021-09-15 RX ORDER — ACETAMINOPHEN 500 MG
650 TABLET ORAL ONCE
Refills: 0 | Status: COMPLETED | OUTPATIENT
Start: 2021-09-15 | End: 2021-09-15

## 2021-09-15 RX ORDER — GABAPENTIN 400 MG/1
300 CAPSULE ORAL
Refills: 0 | Status: DISCONTINUED | OUTPATIENT
Start: 2021-09-15 | End: 2021-09-20

## 2021-09-15 RX ADMIN — Medication 650 MILLIGRAM(S): at 21:17

## 2021-09-15 RX ADMIN — SODIUM CHLORIDE 2500 MILLILITER(S): 9 INJECTION INTRAMUSCULAR; INTRAVENOUS; SUBCUTANEOUS at 00:16

## 2021-09-15 RX ADMIN — Medication 600 MILLIGRAM(S): at 22:56

## 2021-09-15 RX ADMIN — GABAPENTIN 300 MILLIGRAM(S): 400 CAPSULE ORAL at 18:35

## 2021-09-15 RX ADMIN — Medication 5 MILLIGRAM(S): at 21:17

## 2021-09-15 RX ADMIN — Medication 1 DROP(S): at 12:33

## 2021-09-15 RX ADMIN — Medication 1 TABLET(S): at 12:32

## 2021-09-15 RX ADMIN — Medication 2 CAPSULE(S): at 17:37

## 2021-09-15 RX ADMIN — SACUBITRIL AND VALSARTAN 1 TABLET(S): 24; 26 TABLET, FILM COATED ORAL at 18:27

## 2021-09-15 RX ADMIN — RIVAROXABAN 20 MILLIGRAM(S): KIT at 02:01

## 2021-09-15 RX ADMIN — CEFEPIME 100 MILLIGRAM(S): 1 INJECTION, POWDER, FOR SOLUTION INTRAMUSCULAR; INTRAVENOUS at 21:16

## 2021-09-15 RX ADMIN — DORZOLAMIDE HYDROCHLORIDE TIMOLOL MALEATE 1 DROP(S): 20; 5 SOLUTION/ DROPS OPHTHALMIC at 06:26

## 2021-09-15 RX ADMIN — Medication 40 MILLIGRAM(S): at 18:26

## 2021-09-15 RX ADMIN — PANTOPRAZOLE SODIUM 40 MILLIGRAM(S): 20 TABLET, DELAYED RELEASE ORAL at 06:25

## 2021-09-15 RX ADMIN — OXYCODONE AND ACETAMINOPHEN 1 TABLET(S): 5; 325 TABLET ORAL at 13:20

## 2021-09-15 RX ADMIN — RIVAROXABAN 20 MILLIGRAM(S): KIT at 18:27

## 2021-09-15 RX ADMIN — Medication 650 MILLIGRAM(S): at 22:20

## 2021-09-15 RX ADMIN — DORZOLAMIDE HYDROCHLORIDE TIMOLOL MALEATE 1 DROP(S): 20; 5 SOLUTION/ DROPS OPHTHALMIC at 18:27

## 2021-09-15 RX ADMIN — GABAPENTIN 300 MILLIGRAM(S): 400 CAPSULE ORAL at 06:26

## 2021-09-15 RX ADMIN — Medication 200 MILLIGRAM(S): at 22:25

## 2021-09-15 RX ADMIN — Medication 2 CAPSULE(S): at 12:32

## 2021-09-15 RX ADMIN — Medication 20 MILLIGRAM(S): at 12:47

## 2021-09-15 RX ADMIN — Medication 166.67 MILLIGRAM(S): at 22:28

## 2021-09-15 RX ADMIN — Medication 650 MILLIGRAM(S): at 18:26

## 2021-09-15 RX ADMIN — CEFEPIME 100 MILLIGRAM(S): 1 INJECTION, POWDER, FOR SOLUTION INTRAMUSCULAR; INTRAVENOUS at 06:25

## 2021-09-15 RX ADMIN — Medication 400 UNIT(S): at 12:32

## 2021-09-15 RX ADMIN — Medication 100 MILLIGRAM(S): at 12:32

## 2021-09-15 RX ADMIN — LATANOPROST 1 DROP(S): 0.05 SOLUTION/ DROPS OPHTHALMIC; TOPICAL at 21:17

## 2021-09-15 RX ADMIN — TAMSULOSIN HYDROCHLORIDE 0.4 MILLIGRAM(S): 0.4 CAPSULE ORAL at 21:16

## 2021-09-15 RX ADMIN — Medication 5 MILLIGRAM(S): at 02:00

## 2021-09-15 RX ADMIN — CEFEPIME 100 MILLIGRAM(S): 1 INJECTION, POWDER, FOR SOLUTION INTRAMUSCULAR; INTRAVENOUS at 13:28

## 2021-09-15 RX ADMIN — OXYCODONE AND ACETAMINOPHEN 1 TABLET(S): 5; 325 TABLET ORAL at 14:20

## 2021-09-15 RX ADMIN — DULOXETINE HYDROCHLORIDE 60 MILLIGRAM(S): 30 CAPSULE, DELAYED RELEASE ORAL at 12:32

## 2021-09-15 RX ADMIN — Medication 650 MILLIGRAM(S): at 19:26

## 2021-09-15 RX ADMIN — Medication 166.67 MILLIGRAM(S): at 09:06

## 2021-09-15 NOTE — CHART NOTE - NSCHARTNOTEFT_GEN_A_CORE
Patient Name: Malcolm Cosby Date: 1940  Address: [address] [place], [place] [address]Btj: Male  Rx Written	Rx Dispensed	Drug	Quantity	Days Supply	Prescriber Name	Prescriber Cris #	Payment Method	Dispenser  04/15/2021	04/16/2021	oxycodone-acetaminophen 5-325 mg tab	10	3	Stephani Galeas	[id number]	Insurance	[facility]   04/12/2021	04/13/2021	alprazolam 0.25 mg tablet	90	30	EssDonnell lim	[id number]	Insurance	[facility]   12/04/2020	12/04/2020	alprazolam 0.25 mg tablet	90	30	EssDonnell lim	[id number]	Insurance	[facility]     Patient Name: Malcolm Cosby Date: 1940  Address: [place] [address]Fmd: Male  Rx Written	Rx Dispensed	Drug	Quantity	Days Supply	Prescriber Name	Prescriber Cris #	Payment Method	Dispenser  06/11/2021	06/12/2021	alprazolam 0.25 mg tablet	90	30	EssDonnell lim	[id number]	[facility]

## 2021-09-15 NOTE — REVIEW OF SYSTEMS
[Skin Wound] : skin wound [Fever] : no fever [Chills] : no chills [Eye Pain] : no eye pain [Shortness Of Breath] : no shortness of breath [Abdominal Pain] : no abdominal pain [Vomiting] : no vomiting [FreeTextEntry3] : glaucoma, macular degeneration [FreeTextEntry5] : cardiac defibrillator, htn, hld [FreeTextEntry9] : bilateral hammertoes [de-identified] : right hallux dorsal IPJ ulcer down to skin, subcutaneous tissue, fat, bone [de-identified] : lumbar radiculopathy, sciatica, spinal stenosis [de-identified] : pancreatic cancer

## 2021-09-15 NOTE — ASSESSMENT
[Verbal] : Verbal [Demo] : Demo [Patient] : Patient [Family member] : Family member [Good - alert, interested, motivated] : Good - alert, interested, motivated [Verbalizes knowledge/Understanding] : Verbalizes knowledge/understanding [Foot Care] : foot care [Skin Care] : skin care [Pressure relief] : pressure relief [Signs and symptoms of infection] : sign and symptoms of infection [Nutrition] : nutrition [How and When to Call] : how and when to call [Labs and Tests] : labs and tests [Off-loading] : off-loading [Patient responsibility to plan of care] : patient responsibility to plan of care [Stable] : stable [Home] : Home [Cane] : Cane [Not Applicable - Long Term Care/Home Health Agency] : Long Term Care/Home Health Agency: Not Applicable [Dressing changes] : dressing changes [] : No [FreeTextEntry2] : Promote optimal skin integrity\par Offloading\par Infection prevention\par Pressure Relief\par  [FreeTextEntry4] : F/U 1 week for assessment\par pt to have pancreatic surgery on 9/23/21, DPM discussed hammer toe correction post pancreatic surgery when cleared by surgical team.

## 2021-09-15 NOTE — PLAN
[FreeTextEntry1] : continue wound care and off loading , patient awaiting pancreatic procedure  Spent 20 minutes for patient care and medical decision making.\par

## 2021-09-15 NOTE — CONSULT NOTE ADULT - PROBLEM SELECTOR RECOMMENDATION 9
- Patient seen and evaluated  - Patient consented, signed and witnessed for Bedside Bone Biopsy and Wound Debridement of the Right Hallux by Dr. López, down to the bone level/ layer  - Patient tolerated the bone biopsy well  - Area irrigated with normal saline and pat dry   - Area dressed with betadine along the edges and Aquacel Ag and DSD  - Follow up on the Bone biopsy and cultures  - Follow up on the Bone Scane  - Follow up with infectious disease recommendations  - Podiatry team will continue to follow patient while in house

## 2021-09-15 NOTE — CONSULT NOTE ADULT - SUBJECTIVE AND OBJECTIVE BOX
[facility]  INFECTIOUS DISEASES   [address] [place] [address]  Tel: [phone]     Fax: [phone]  =======================================================    MRN-[id number]  JUANIS DE SANTIAGO     CC: right hallux ulcer/cellulitis    HPI:  81 yo man with PMH of pancreatic cancer  (3/2021) on chemo, HTN, CHF, CAD s/p stents, defibrillator, TAVR (2021), and bilateral PE (2021) was admitted with R foot pain. Patient states he was advised to go the wound care center by his oncologist at [facility] due to increased redness and edema to right lower leg. Pt follows podiatry, Dr. Felipe for right toe wound. Pt was seen by podiatry at the wound care center and was sent to the ED for further management. Denies fever, chills, chest pain, shortness of breath, abdominal pain, nausea, vomiting. He has been started on cefepime and vancomycin and ID was called for further recommendations.     PAST MEDICAL & SURGICAL HISTORY:  HTN (hypertension)  HLD (hyperlipidemia)  GERD (gastroesophageal reflux disease)  Cardiomyopathy  History of total hip replacement left  History of laminectomy  ICD (implantable cardiac defibrillator) in place  Benign testicular tumor  History of hip replacement    Social Hx: no smoking, ETOH or drugs     FAMILY HISTORY:  Family history of stroke (Mother)    Allergies  No Known Allergies    Antibiotics:  cefepime   IVPB 2000 milliGRAM(s) IV Intermittent every 8 hours  vancomycin  IVPB 1250 milliGRAM(s) IV Intermittent every 12 hours     REVIEW OF SYSTEMS:  CONSTITUTIONAL:  No Fever or chills  HEENT:  No diplopia or blurred vision.  No sore throat or runny nose.  CARDIOVASCULAR:  No chest pain or SOB.  RESPIRATORY:  No cough, shortness of breath, PND or orthopnea.  GASTROINTESTINAL:  No nausea, vomiting or diarrhea.  GENITOURINARY:  No dysuria, frequency or urgency. No Blood in urine  MUSCULOSKELETAL: foot ulcer   SKIN:  No change in skin, hair or nails.  NEUROLOGIC:  No paresthesias, fasciculations, seizures or weakness.  PSYCHIATRIC:  No disorder of thought or mood.  ENDOCRINE:  No heat or cold intolerance, polyuria or polydipsia.  HEMATOLOGICAL:  No easy bruising or bleeding.     Physical Exam:  Vital Signs Last 24 Hrs  T(C): 36.7 (15 Sep 2021 13:07), Max: 38.3 (14 Sep 2021 19:55)  T(F): 98.1 (15 Sep 2021 13:07), Max: 100.9 (14 Sep 2021 19:55)  HR: 91 (15 Sep 2021 13:07) (76 - 91)  BP: 133/85 (15 Sep 2021 13:07) (95/46 - 142/71)  RR: 17 (15 Sep 2021 13:07) (15 - 19)  SpO2: 94% (15 Sep 2021 13:07) (94% - 100%)  Height (cm): 185.4 (09-15 @ 12:45)  Weight (kg): 97.5 (09-15 @ 12:45)  BMI (kg/m2): 28.4 (09-15 @ 12:45)  BSA (m2): 2.22 (09-15 @ 12:45)  GEN: NAD  HEENT: normocephalic and atraumatic. EOMI. PERRL.    NECK: Supple.  No lymphadenopathy   LUNGS: Clear to auscultation.  HEART: Regular rate and rhythm without murmur.  ABDOMEN: Soft, nontender, and nondistended.  Positive bowel sounds.    : No CVA tenderness  EXTREMITIES: R foot dressed, he didn't want to open, lower leg with swelling and erythema   NEUROLOGIC: grossly intact.  PSYCHIATRIC: Appropriate affect .  SKIN: No ulceration or induration present.    Labs:  09-15    135  |  106  |  13  ----------------------------<  103<H>  3.6   |  24  |  0.60    Ca    7.8<L>      15 Sep 2021 06:45    TPro  6.4  /  Alb  2.7<L>  /  TBili  1.0  /  DBili  x   /  AST  15  /  ALT  16  /  AlkPhos  101  09-15                        9.0    5.57  )-----------( 72       ( 15 Sep 2021 06:45 )             27.1     PT/INR - ( 14 Sep 2021 20:17 )   PT: 31.4 sec;   INR: 2.82 ratio    PTT - ( 14 Sep 2021 20:17 )  PTT:42.6 sec  Urinalysis Basic - ( 15 Sep 2021 02:08 )    Color: Yellow / Appearance: Clear / S.010 / pH: x  Gluc: x / Ketone: Negative  / Bili: Negative / Urobili: 1   Blood: x / Protein: 30 mg/dL / Nitrite: Negative   Leuk Esterase: Negative / RBC: 0-2 /HPF / WBC 0-2   Sq Epi: x / Non Sq Epi: Occasional / Bacteria: x    LIVER FUNCTIONS - ( 15 Sep 2021 06:45 )  Alb: 2.7 g/dL / Pro: 6.4 g/dL / ALK PHOS: 101 U/L / ALT: 16 U/L / AST: 15 U/L / GGT: x           C-Reactive Protein, Serum: 168 mg/L (09-15-21 @ 07:50)    Sedimentation Rate, Erythrocyte: 53 mm/hr (21 @ 20:17)    COVID-19 PCR: NotDetec (21 @ 20:17)    All imaging and other data have been reviewed.  < from: Xray Tibia + Fibula 2 Views, Right (21 @ 20:54) >  IMPRESSION: No acute chest finding. Ankle edema and degenerative changes in the foot again seen. Fairly advanced right knee degeneration also again noted.    Assessment and Plan:   81 yo man with PMH of pancreatic cancer  (3/2021) on chemo, HTN, CHF, CAD s/p stents, defibrillator, TAVR (2021), and bilateral PE (2021) was admitted with R foot pain. Patient states he was advised to go the wound care center by his oncologist at [facility] due to increased redness and edema to right lower leg.     ESR 53  Xray with degenerative changes     R lower leg cellulitis, R hallux wound    Recommendations:   - Blood culture x 2   - Wound culture   - Podiatry/wound care follow up  - Agree with cefepime 2gm q8h   - Agree with vancomycin 1250mg q12, will send trough before 4th dose, keep 15 to 20  - Based on culture results will modify ABx regimen    Thank you for courtesy of this consult.     Will follow.  Discussed with the primary team.     Jaren Bell MD  Division of Infectious Diseases   Cell [phone] between 8am and 6pm   After 6pm and weekends please call ID service at [phone].

## 2021-09-15 NOTE — HISTORY OF PRESENT ILLNESS
[FreeTextEntry1] : Dorsal ulcer right hallux at the DIPJ , clean but probes to bone , patient high risk for surgery

## 2021-09-15 NOTE — PHYSICAL EXAM
[2 x 2] : 2 x 2  [2+] : left 2+ [Skin Ulcer] : ulcer [Ankle Swelling (On Exam)] : not present [Varicose Veins Of Lower Extremities] : not present [] : not present [de-identified] : A&Ox3, NAD [de-identified] : HTN, HLD , pacemaker  [de-identified] : right hallux dorsal IPJ ulcer down to skin, subcutaneous tissue, fat, bone [de-identified] : Bilateral hammertoes [de-identified] : diminished light touch sensation bilaterally [de-identified] : No Treatment  [FreeTextEntry1] : Elbow-mass under skin- no open wounds [de-identified] : Cleansed with Normal Saline.  [FreeTextEntry7] : Hallux [FreeTextEntry8] : 0.5 [FreeTextEntry9] : 0.9 [de-identified] : 0.5 probe to bone [de-identified] : serosanguineous [de-identified] : with mild maceration  [de-identified] : 10-20% [de-identified] : 80-90% [de-identified] : Allevyn Ag [de-identified] : Cleansed with Normal saline\par Cloth Tape [TWNoteComboBox1] : Left [TWNoteComboBox4] : None [TWNoteComboBox9] : Right [de-identified] : Small [de-identified] : No [de-identified] : Traumatic [de-identified] : No [de-identified] : Normal [de-identified] : None [de-identified] : None [de-identified] : 100% [de-identified] : Yes [de-identified] : 3x Weekly [de-identified] : Primary Dressing

## 2021-09-15 NOTE — PROGRESS NOTE ADULT - PROBLEM SELECTOR PLAN 5
Chronic systolic CHF s/p AICD  - Admit to [facility]  - Given IV NS 2.5 bolus x1 in ED  - On Lasix 20 mg PO qd, Entresto 1 tab BID, Sotalol 40 mg PO BID, Bystolic 5 mg PO qd with hold parameters  - Tolerating Room air  -no signs of acute volume overload  - Strict I/Os, daily weights  - Monitor and replace electrolytes

## 2021-09-15 NOTE — PROGRESS NOTE ADULT - PROBLEM SELECTOR PLAN 3
Chronic, soft BP noted and BP meds were held this morning. Will resume step-wise  - C/w Lasix 20 mg PO qd, Entresto 1 tab BID, Sotalol 40 mg PO BID with hold parameters  - hold Bystolic 5 mg PO qd for now in setting of soft BP  - Monitor routine hemodynamics

## 2021-09-15 NOTE — PROCEDURE NOTE - NSCOMPLICATION_GEN_A_CORE
41 yo M with intellectual disability here with Severe sepsis 2/2 strep pharyngitis and LLE purulent cellulitis s/p debridement, hypomagnesemia (improved)    worsening erythema/ edema of LLE, r/o abscess formation  c/w IV abx for today, trend wbc/ fever curve  CT IV contrast of LE  burn recall for possible repeat debridement  erythema outlined/ will keep track  if no abscess/ and improvement in erythema/ edema, improving wbc, and no fevers, then will possibly discharge tomorrow, but I suspect there may need to be a recurrent drainage first for source control- will f/u imaging result and further burn guidance upon reevaluation of limb  NO clinical suspicion of nec fasciitis at this moment  c/w dvt ppx, us duplex has been negative no complications

## 2021-09-15 NOTE — PROGRESS NOTE ADULT - PROBLEM SELECTOR PLAN 1
Patient presents with LLE erythema, edema, and pain likely 2/2 cellulitis  - Admit to [facility]  - Continue IV Vancomycin and IV Cefepime  - XR right foot: no gas seen - soft tissue swelling by hallux  -foot XRays reviewed  - F/u blood cultures, ESR, CRP  - ID (Dr. Bell) consulted, f/u recs  - Podiatry (Dr. López) consulted, f/u recs

## 2021-09-15 NOTE — ED ADULT NURSE REASSESSMENT NOTE - NS ED NURSE REASSESS COMMENT FT1
Patient and report received at 0700.  Patient is alert and oriented x 4, lying on stretcher in room 9.  Right foot wound is dressed.  Respirations are even and unlabored, skin is warm and dry.  Patient denies needs at this time.  Call bell is within reach.

## 2021-09-15 NOTE — ED ADULT NURSE REASSESSMENT NOTE - NS ED NURSE REASSESS COMMENT FT1
Admitting resident Dr Squires at bedside for H&P.  daughter at bedside to facilitate with reconciliation of home medications.  medicated with xarelto as pt did not take his dose earlier today.  tmax 100.9 medicated with percocet 2tabs with relief for fever and pt takes percocet at home for his back pain.   RI ACW IP accessed without difficulty +blood return no resistance met.  Zosyn and vanco given for cellulitis.  BP medications held as pt SBP <100.  voiding freely in urinal.  call bell within reach, bed in lowest position.

## 2021-09-15 NOTE — PROGRESS NOTE ADULT - PROBLEM SELECTOR PLAN 10
VTE ppx: Continue home Xarelto 20 mg PO qd    11. Hyponatremia: Resolved  12. BPH: Continue home Flomax 0.4 mg PO qhs  13. Macular degeneration: Continue home Dorzolamide/Timolol, Ketorolac, Latanoprost  14. GERD: Continue therapeutic interchange for home Omeprazole 20 mg PO qd - Protonix 40 mg PO qd  15. Neuropathy: Continue home Gabapentin 300 mg PO BID    IMPROVE VTE Individual Risk Assessment        RISK                                                          Points  [ x ] Previous VTE                                                3  [  ] Thrombophilia                                             2  [  ] Lower limb paralysis                                   2        (unable to hold up >15 seconds)    [ x ] Current Cancer                                             2         (within 6 months)  [  ] Immobilization > 24 hrs                              1  [  ] ICU/CCU stay > 24 hours                             1  [ x ] Age > 60                                                         1  IMPROVE VTE Score: 6

## 2021-09-15 NOTE — PROGRESS NOTE ADULT - ASSESSMENT
----- Message from Ludwin Vo 80 year old male with PMHx pancreatic cancer (dx 3/2021, currently undergoing chemo), HTN, HLD, CHF (unknown EF), CAD s/p stents x2 (~15 years ago), defibrillator, TAVR (4/2021), bilateral PE (7/2021) on Xarelto, spinal stenosis, GERD, BPH, macular degeneration presents to the ED c/o R foot pain admitted for R hallux pressure injury and RLE cellulitis.

## 2021-09-15 NOTE — PROGRESS NOTE ADULT - PROBLEM SELECTOR PLAN 7
Likely anemia of chronic disease in the setting of pancreatic cancer  - H/H 9.8/30.0 on admission, baseline hemoglobin unknown  - Currently hemodynamically stable, monitor for signs and symptoms of active bleeding  - Consider transfusion for hemoglobin <8  -continue to monitor thrombocytopenia.

## 2021-09-15 NOTE — PROGRESS NOTE ADULT - PROBLEM SELECTOR PLAN 9
Chronic, diagnosed in March 2021 on chemotherapy (does not recall name of medication)  - Continue home medication Pancrelipase 36,000 2 tabs PO TID with meals  - Pt scheduled for Whipple's procedure on 9/23/2021 with Dr. Shon Murphy at [place]

## 2021-09-15 NOTE — CONSULT NOTE ADULT - SUBJECTIVE AND OBJECTIVE BOX
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 Patient states he was advised to go the wound care center by his oncologist at [facility] due to increased redness and edema to right lower leg. Pt follows podiatry, Dr. Felipe for right toe wound. Pt was seen by podiatry at the wound care center and was sent to the ED for further management. Denies fever, chills, chest pain, shortness of breath, abdominal pain, nausea, vomiting.    In the ED,  Vital Signs T(F): 100.9 HR: 91 BP: 125/67 RR: 18 SpO2: 100%  Labs significant for: ESR 53, H/H 9.8/30, INR 2.82, Na 132, Tbili 1.3  CXR: no acute infiltrates, hardware noted  XR right foot: no gas seen - soft tissue swelling by hallux  EKG: SR at 97bpm, LBBB (14 Sep 2021 23:11)      80y year old Male seen at [facility] for ----------.  Denies any fever, chills, nausea, vomiting, chest pain, shortness of breath, or calf pain at this time.    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    Benign testicular tumor    History of hip replacement        Allergies    No Known Allergies    Intolerances        MEDICATIONS  (STANDING):  cefepime   IVPB 2000 milliGRAM(s) IV Intermittent every 8 hours  cholecalciferol 400 Unit(s) Oral daily  dorzolamide 2%/timolol 0.5% Ophthalmic Solution 1 Drop(s) Both EYES two times a day  DULoxetine 60 milliGRAM(s) Oral daily  furosemide    Tablet 20 milliGRAM(s) Oral daily  gabapentin 300 milliGRAM(s) Oral two times a day  ketorolac 0.5% Ophthalmic Solution 1 Drop(s) Both EYES daily  latanoprost 0.005% Ophthalmic Solution 1 Drop(s) Both EYES at bedtime  melatonin 5 milliGRAM(s) Oral at bedtime  multivitamin 1 Tablet(s) Oral daily  pancrelipase  (CREON 36,000 Lipase Units) 2 Capsule(s) Oral three times a day with meals  pantoprazole    Tablet 40 milliGRAM(s) Oral before breakfast  pyridoxine 100 milliGRAM(s) Oral daily  rivaroxaban 20 milliGRAM(s) Oral with dinner  sacubitril 49 mG/valsartan 51 mG 1 Tablet(s) Oral two times a day  sotalol 40 milliGRAM(s) Oral two times a day  tamsulosin 0.4 milliGRAM(s) Oral at bedtime  vancomycin  IVPB 1250 milliGRAM(s) IV Intermittent every 12 hours    MEDICATIONS  (PRN):  acetaminophen   Tablet .. 650 milliGRAM(s) Oral every 6 hours PRN Temp greater or equal to 38C (100.4F), Mild Pain (1 - 3)      Social History:  Tobacco: denies  EtOH: occasional  Recreational drug use: denies  Lives: alone  Ambulates: cane  ADLs: independent  Vaccinations: Pfizer x2 2/2021 (14 Sep 2021 23:11)      FAMILY HISTORY:  Family history of stroke (Mother)        Vital Signs Last 24 Hrs  T(C): 37.3 (15 Sep 2021 07:25), Max: 38.3 (14 Sep 2021 19:55)  T(F): 99.2 (15 Sep 2021 07:25), Max: 100.9 (14 Sep 2021 19:55)  HR: 91 (15 Sep 2021 07:25) (79 - 91)  BP: 115/71 (15 Sep 2021 07:25) (95/46 - 125/67)  BP(mean): --  RR: 18 (15 Sep 2021 07:25) (16 - 19)  SpO2: 97% (15 Sep 2021 07:25) (96% - 100%)    PHYSICAL EXAM:  Vascular: DP & PT palpable bilaterally, Capillary refill 3 seconds, Digital hair present bilaterally  Neurological: Light touch sensation intact bilaterally  Musculoskeletal: 5/5 strength in all quadrants bilaterally, AJ & STJ ROM intact  Dermatological: ---------- cm ulceration noted to the ----------, granular wound bed, no probe to bone, no periwound erythema, no fluctuance, no malodor, no proximal streaking at this time    CBC Full  -  ( 15 Sep 2021 06:45 )  WBC Count : 5.57 K/uL  RBC Count : 2.61 M/uL  Hemoglobin : 9.0 g/dL  Hematocrit : 27.1 %  Platelet Count - Automated : 72 K/uL  Mean Cell Volume : 103.8 fl  Mean Cell Hemoglobin : 34.5 pg  Mean Cell Hemoglobin Concentration : 33.2 gm/dL  Auto Neutrophil # : 4.20 K/uL  Auto Lymphocyte # : 0.40 K/uL  Auto Monocyte # : 0.90 K/uL  Auto Eosinophil # : 0.01 K/uL  Auto Basophil # : 0.01 K/uL  Auto Neutrophil % : 75.3 %  Auto Lymphocyte % : 7.2 %  Auto Monocyte % : 16.2 %  Auto Eosinophil % : 0.2 %  Auto Basophil % : 0.2 %      ----------CHEM PANEL----------    PT/INR - ( 14 Sep 2021 20:17 )   PT: 31.4 sec;   INR: 2.82 ratio         PTT - ( 14 Sep 2021 20:17 )  PTT:42.6 sec        Imaging: ----------   HPI:  80 year old male with PMHx pancreatic cancer (dx 3/2021, currently undergoing chemo), HTN, HLD, CHF (unknown EF) CAD s/p stents x2 (~15 years ago), defibrillator, 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 Patient states he was advised to go the wound care center by his oncologist at [facility] due to increased redness and edema to right lower leg. Pt follows podiatry, Dr. Felipe for right toe wound. Pt was seen by podiatry at the wound care center and was sent to the ED for further management. Denies fever, chills, chest pain, shortness of breath, abdominal pain, nausea, vomiting.    In the ED,  Vital Signs T(F): 100.9 HR: 91 BP: 125/67 RR: 18 SpO2: 100%  Labs significant for: ESR 53, H/H 9.8/30, INR 2.82, Na 132, Tbili 1.3  CXR: no acute infiltrates, hardware noted  XR right foot: no gas seen - soft tissue swelling by hallux  EKG: SR at 97bpm, LBBB (14 Sep 2021 23:11)      80y year old Male seen at [facility] for Right Hallux infection, possible Osteomyelitis. The patient states that he had this wound for more than several months. 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 The patient states he has been following at the [place] wound care center/ hyperbaric center and he has been receiving local wound care. The patient states that his daughter has been doing the dressing changes at home. The patient was sent from the wound care center to the ED for further evaluation and to receive IV antibiotics. The patient also states that he has a whipple procedure that is scheduled to be performed in two weeks. Denies any fever, chills, nausea, vomiting, chest pain, shortness of breath, or calf pain at this time.    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    Benign testicular tumor    History of hip replacement        Allergies    No Known Allergies    Intolerances        MEDICATIONS  (STANDING):  cefepime   IVPB 2000 milliGRAM(s) IV Intermittent every 8 hours  cholecalciferol 400 Unit(s) Oral daily  dorzolamide 2%/timolol 0.5% Ophthalmic Solution 1 Drop(s) Both EYES two times a day  DULoxetine 60 milliGRAM(s) Oral daily  furosemide    Tablet 20 milliGRAM(s) Oral daily  gabapentin 300 milliGRAM(s) Oral two times a day  ketorolac 0.5% Ophthalmic Solution 1 Drop(s) Both EYES daily  latanoprost 0.005% Ophthalmic Solution 1 Drop(s) Both EYES at bedtime  melatonin 5 milliGRAM(s) Oral at bedtime  multivitamin 1 Tablet(s) Oral daily  pancrelipase  (CREON 36,000 Lipase Units) 2 Capsule(s) Oral three times a day with meals  pantoprazole    Tablet 40 milliGRAM(s) Oral before breakfast  pyridoxine 100 milliGRAM(s) Oral daily  rivaroxaban 20 milliGRAM(s) Oral with dinner  sacubitril 49 mG/valsartan 51 mG 1 Tablet(s) Oral two times a day  sotalol 40 milliGRAM(s) Oral two times a day  tamsulosin 0.4 milliGRAM(s) Oral at bedtime  vancomycin  IVPB 1250 milliGRAM(s) IV Intermittent every 12 hours    MEDICATIONS  (PRN):  acetaminophen   Tablet .. 650 milliGRAM(s) Oral every 6 hours PRN Temp greater or equal to 38C (100.4F), Mild Pain (1 - 3)      Social History:  Tobacco: denies  EtOH: occasional  Recreational drug use: denies  Lives: alone  Ambulates: cane  ADLs: independent  Vaccinations: Pfizer x2 2/2021 (14 Sep 2021 23:11)      FAMILY HISTORY:  Family history of stroke (Mother)        Vital Signs Last 24 Hrs  T(C): 37.3 (15 Sep 2021 07:25), Max: 38.3 (14 Sep 2021 19:55)  T(F): 99.2 (15 Sep 2021 07:25), Max: 100.9 (14 Sep 2021 19:55)  HR: 91 (15 Sep 2021 07:25) (79 - 91)  BP: 115/71 (15 Sep 2021 07:25) (95/46 - 125/67)  BP(mean): --  RR: 18 (15 Sep 2021 07:25) (16 - 19)  SpO2: 97% (15 Sep 2021 07:25) (96% - 100%)    PHYSICAL EXAM:  Vascular: DP & PT faintly palpable bilaterally, Capillary refill 3 seconds, No Digital hair present bilaterally, edema and erythema along the right hallux, skin temperature slightly warmer compared to the contralateral   Neurological: Loss of protective sensation b/l  Musculoskeletal: 5/5 strength in all quadrants bilaterally, AJ & STJ ROM intact, no pain upon palpation of the right hallux, rigid non reducible digital contracture at the IPJ of the right hallux.  Dermatological:   Pre- Debridement: Bullae along the distal lateral aspect of the right hallux, more than 1 cmx 1cm in size with serous drainage and full thickness wound along the anterior medial aspect of the right hallux - size 0.2cmx0.2cm- macerated borders, red granular base, moderate serous sanguineous drainage, does not probe to bone, no tunneling no undermining , no malodoro  Post Debridement & Bedside Bone Biopsy- Full thickness wound along the dorsal aspect of the right hallux, size 1.5cmx0.2cmx0.2cm- probes to bone, partial thickness wound along the distal lateral aspect after drainage of more than 2cc of serous fluid, probes to bone, tunneling and undermining, no malodor and sanguineous drainage     CBC Full  -  ( 15 Sep 2021 06:45 )  WBC Count : 5.57 K/uL  RBC Count : 2.61 M/uL  Hemoglobin : 9.0 g/dL  Hematocrit : 27.1 %  Platelet Count - Automated : 72 K/uL  Mean Cell Volume : 103.8 fl  Mean Cell Hemoglobin : 34.5 pg  Mean Cell Hemoglobin Concentration : 33.2 gm/dL  Auto Neutrophil # : 4.20 K/uL  Auto Lymphocyte # : 0.40 K/uL  Auto Monocyte # : 0.90 K/uL  Auto Eosinophil # : 0.01 K/uL  Auto Basophil # : 0.01 K/uL  Auto Neutrophil % : 75.3 %  Auto Lymphocyte % : 7.2 %  Auto Monocyte % : 16.2 %  Auto Eosinophil % : 0.2 %  Auto Basophil % : 0.2 %      ----------CHEM PANEL----------    09-15    135  |  106  |  13  ----------------------------<  103<H>  3.6   |  24  |  0.60    Ca    7.8<L>      15 Sep 2021 06:45    TPro  6.4  /  Alb  2.7<L>  /  TBili  1.0  /  DBili  x   /  AST  15  /  ALT  16  /  AlkPhos  101  09-15    PT/INR - ( 14 Sep 2021 20:17 )   PT: 31.4 sec;   INR: 2.82 ratio         PTT - ( 14 Sep 2021 20:17 )  PTT:42.6 sec        Imaging:   EXAM: XR FOOT COMP MIN 3 VIEWS RT    EXAM: XR ANKLE COMP MIN 3 VIEWS RT    EXAM: XR CHEST AP OR PA 1V    EXAM: XR TIB FIB AP LAT 2 VIEWS RT      PROCEDURE DATE: 09/14/2021        INTERPRETATION: Chest and right tib-fib, ankle, and foot. Patient has cellulitis of the right leg and wound care the foot. There is history of pancreatic cancer.    AP chest on September 14, 2021 at 8:51 PM.    Heart magnified by technique. Extensive is thoracolumbar hardware left-sided [id number] remain.    Mild right thoracic curve again noted.    Lungs remain clear.    Present study shows a right MediPort new since June 12, 2020.      Right tib-fib. AP and lateral views. Arterial calcifications. No knee effusion.    Moderate to advanced knee degeneration. No bone destruction or fracture.    Right ankle. 3 views. Arterial calcification and ankle edema. No bone destruction or fracture.    Right foot. 3 views.    Partial subluxation of the right first MTP joint with associated degeneration again noted.    Scattered slight IP degeneration again seen. No fracture or radiographic bone destruction.    Right foot is similar to August 4.    IMPRESSION: No acute chest finding. Ankle edema and degenerative changes in the foot again seen. Fairly advanced right knee degeneration also again noted.    --- End of Report ---            JODI GURROLA MD; Attending Radiologist  This document has been electronically signed. Sep 15 2021 11:45AM

## 2021-09-15 NOTE — PROGRESS NOTE ADULT - SUBJECTIVE AND OBJECTIVE BOX
patient seen and examined at bedside. He reports having chronic back pain, and he has pain of his RLE and warmth of RLE. he was sent to the ED yesterday from [facility]. He has no other complaints.   Denies fevers, chills, light-headedness, dizziness, headaches, nausea, vomiting, chest pain, SOB, palpitations, abdominal pain, constipation, diarrhea, melena, hematochezia, dysuria.     He had asymptomatic hypotension earlier today and his BP medications were hold this morning.     T(C): 36.9 (09-15-21 @ 09:07), Max: 38.3 (09-14-21 @ 19:55)  HR: 76 (09-15-21 @ 09:07) (76 - 91)  BP: 128/76 (09-15-21 @ 09:07) (95/46 - 142/71)  RR: 15 (09-15-21 @ 09:07) (15 - 19)  SpO2: 97% (09-15-21 @ 09:07) (96% - 100%)  Wt(kg): --    Physical Exam:   GENERAL: well-groomed, well-developed, NAD  HEENT: head NC/AT; EOM intact, conjunctiva & sclera clear; hearing grossly intact, moist mucous membranes  NECK: supple, no JVD  RESPIRATORY: CTA B/L, no wheezing, rales, rhonchi or rubs, R sided Medi-port  CARDIOVASCULAR: S1&S2, RRR, no murmurs or gallops  ABDOMEN: soft, non-tender, non-distended, + Bowel sounds x4 quadrants, no guarding, rebound or rigidity  MUSCULOSKELETAL:  RLE erythema and warmth from first digit to distal shin  LYMPH: no cervical lymphadenopathy  VASCULAR: Radial pulses 2+ bilaterally, no varicose veins   SKIN: warm and dry, color normal  NEUROLOGIC: AA&O X3, CN2-12 intact w/ no focal deficits, no sensory loss, motor Strength 5/5 in UE & LE B/L  Psych: Normal mood and affect, normal behavior

## 2021-09-15 NOTE — CHART NOTE - NSCHARTNOTEFT_GEN_A_CORE
PLV 3WES 355 D1  JUANIS DE SANTIAGO, 80y, Male  [id number]    Notified by RN that the above patient had a critical value of positive blood cultures.   Growth in:  Aerobic  bottle(s)  Gram Positive Cocci in clusters     History of Present Illness:  This is the first set of blood cultures to return positive.     Interventions Taken: Positive Blood Cultures, Bactremia.     1) Continue current antibiotic course with Cefepime and Vancomycin. Will discuss with day medicine team to determine further antibiotic regimen.   2) Will obtain repeat blood cultures with am labs.   3) Infectious disease Dr. Bell following   5) Will endorse the above diagnostics and findings to the day medicine team for review and follow up, day medicine team to also follow up with internal medicine attending Dr. Vaughan PLV 3WES 355 D1  JUANIS DE SANTIAGO, 80y, Male  [id number]    Notified by RN that the above patient had a critical value of positive blood cultures.   Growth in:  Aerobic  bottle(s)  Gram Positive Cocci in clusters     History of Present Illness:  This is the first set of blood cultures to return positive.     Interventions Taken: Positive Blood Cultures, Bactremia.     1) Continue current antibiotic course with Cefepime and Vancomycin. Will discuss with day medicine team to determine further antibiotic regimen.   2) Will obtain repeat blood cultures with am labs.   3) Infectious disease Dr. Bell following   4) Will endorse the above diagnostics and findings to the day medicine team for review and follow up, day medicine team to also follow up with internal medicine attending Dr. Vaughan    ADDENDUM: 22:18    Called by RN, pt c/o wheezing and productive cough    Pt seen and examined at bedside. Pt states he developed wheezing and cough yesterday. Denies shortness of breath, chest pain, nausea, vomiting, abdominal pain. Pt otherwise feeling well with no other complaints.     T(F): 101.9 (09-15-21 @ 21:10), Max: 101.9 (09-15-21 @ 21:10)  HR: 97 (09-15-21 @ 20:38) (91 - 99)  BP: 115/67 (09-15-21 @ 20:38) (115/67 - 144/79)  RR: 17 (09-15-21 @ 20:38) (17 - 17)  SpO2: 94% (09-15-21 @ 20:38) (94% - 95%)    Physical Exam:  Gen: NAD  HEENT: PERRL  Cardio: +S1, +S2, RRR  Lungs: wheezing LLL appreciated  Abd: soft, NT/ND, +BS x 4 quadrants    A/P: 80 year old male with PMHx pancreatic cancer (dx 3/2021, currently undergoing chemo), HTN, HLD, CHF (unknown EF), CAD s/p stents x2 (~15 years ago), defibrillator, TAVR (4/2021), bilateral PE (7/2021) on Xarelto, spinal stenosis, GERD, BPH, macular degeneration presents to the ED c/o R foot pain admitted for R hallux pressure injury and RLE cellulitis. Now with wheezing and productive cough.    1) Wheezing and productive cough  - CXR performed on admission yesterday 9/14: lungs remain clear  - STAT Duoneb x1  - Start Robitussin 200 mg PO q6h PRN cough  - Satting 97% on RA  - Will continue to follow, RN to call for any changes PLV 3WES 355 D1  JUANIS DE SANTIAGO, 80y, Male  [id number]    Notified by RN that the above patient had a critical value of positive blood cultures.   Growth in:  Aerobic  bottle(s)  Gram Positive Cocci in clusters     History of Present Illness:  This is the first set of blood cultures to return positive.     Interventions Taken: Positive Blood Cultures, Bactremia.     1) Continue current antibiotic course with Cefepime and Vancomycin. Will discuss with day medicine team to determine further antibiotic regimen.   2) Will obtain repeat blood cultures with am labs.   3) Infectious disease Dr. Bell following   4) Will endorse the above diagnostics and findings to the day medicine team for review and follow up, day medicine team to also follow up with internal medicine attending Dr. Vaughan    ADDENDUM: 22:18    Called by RN, pt c/o wheezing and productive cough    Pt seen and examined at bedside. Pt states he developed wheezing and cough yesterday. Denies shortness of breath, chest pain, nausea, vomiting, abdominal pain. Pt otherwise feeling well with no other complaints.     T(F): 101.9 (09-15-21 @ 21:10), Max: 101.9 (09-15-21 @ 21:10)  HR: 97 (09-15-21 @ 20:38) (91 - 99)  BP: 115/67 (09-15-21 @ 20:38) (115/67 - 144/79)  RR: 17 (09-15-21 @ 20:38) (17 - 17)  SpO2: 94% (09-15-21 @ 20:38) (94% - 95%)    Physical Exam:  Gen: NAD  HEENT: PERRL  Cardio: +S1, +S2, RRR  Lungs: wheezing LLL appreciated  Abd: soft, NT/ND, +BS x 4 quadrants    A/P: 80 year old male with PMHx pancreatic cancer (dx 3/2021, currently undergoing chemo), HTN, HLD, CHF (unknown EF), CAD s/p stents x2 (~15 years ago), defibrillator, TAVR (4/2021), bilateral PE (7/2021) on Xarelto, spinal stenosis, GERD, BPH, macular degeneration presents to the ED c/o R foot pain admitted for R hallux pressure injury and RLE cellulitis. Now with wheezing and productive cough.    1) Wheezing and productive cough in the setting of bacteremia   - CXR performed on admission yesterday 9/14: lungs remain clear  - Growth in aerobic bottle: Gram Positive Cocci in clusters   - STAT Duoneb x1  - Start Robitussin 200 mg PO q6h PRN cough  - Satting 97% on RA  - Will continue to follow, RN to call for any changes PLV 3WES 355 D1  JUANIS DE SANTIAGO, 80y, Male  [id number]    Notified by RN that the above patient had a critical value of positive blood cultures.   Growth in:  Aerobic  bottle(s)  Gram Positive Cocci in clusters     History of Present Illness:  This is the first set of blood cultures to return positive.     Interventions Taken: Positive Blood Cultures, Bacteremia.     1) Continue current antibiotic course with Cefepime and Vancomycin. Will discuss with day medicine team to determine further antibiotic regimen.   2) Will obtain repeat blood cultures with AM labs.   3) Infectious disease Dr. Bell following   4) Will endorse the above diagnostics and findings to the day medicine team for review and follow up, day medicine team to also follow up with internal medicine attending Dr. Vaughan    ADDENDUM: 22:18    Called by RN, pt c/o wheezing and productive cough    Pt seen and examined at bedside. Pt states he developed wheezing and cough yesterday. Denies shortness of breath, chest pain, nausea, vomiting, abdominal pain. Pt otherwise feeling well with no other complaints.     T(F): 101.9 (09-15-21 @ 21:10), Max: 101.9 (09-15-21 @ 21:10)  HR: 97 (09-15-21 @ 20:38) (91 - 99)  BP: 115/67 (09-15-21 @ 20:38) (115/67 - 144/79)  RR: 17 (09-15-21 @ 20:38) (17 - 17)  SpO2: 94% (09-15-21 @ 20:38) (94% - 95%)    Physical Exam:  Gen: NAD  HEENT: PERRL  Cardio: +S1, +S2, RRR  Lungs: wheezing LLL appreciated  Abd: soft, NT/ND, +BS x 4 quadrants    A/P: 80 year old male with PMHx pancreatic cancer (dx 3/2021, currently undergoing chemo), HTN, HLD, CHF (unknown EF), CAD s/p stents x2 (~15 years ago), defibrillator, TAVR (4/2021), bilateral PE (7/2021) on Xarelto, spinal stenosis, GERD, BPH, macular degeneration presents to the ED c/o R foot pain admitted for R hallux pressure injury and RLE cellulitis. Now with wheezing and productive cough.    1) Wheezing and productive cough in the setting of bacteremia   - CXR performed on admission yesterday 9/14: lungs remain clear  - Growth in aerobic bottle: Gram Positive Cocci in clusters   - STAT Duoneb x1  - Start Robitussin 200 mg PO q6h PRN cough  - Satting 97% on RA  - Will continue to follow, RN to call for any changes    ADDENDUM: 22:48  Called by RN, pt febrile 101.6 F (rectal) @22:38 s/p Tylenol 650 mg PO x1 @21:17    1) Fever in the setting of bacteremia and cellulitis   - Continue Tylenol 650 mg PO q6h PRN fever 100.4 or mild pain  - STAT Ibuprofen 600 mg PO x1  - Will consider cooling blanket for worsening fever  - Will continue to follow, RN to call for any changes

## 2021-09-15 NOTE — CONSULT NOTE ADULT - ASSESSMENT
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 López performed bedside bone biopsy and culture of the right hallux at bedside     Patient consented, signed and witnessed and patient tolerated the procedure well    Area dressed with Aquacel Ag and DSD    Podiatry team will continue to follow patient while in house

## 2021-09-15 NOTE — PROCEDURE NOTE - NSINDICATIONS_GEN_A_CORE
devitalized or nonviable tissue at the level of:/prepare site for appropriate surgical interventions to optimize wound healing

## 2021-09-16 LAB
ANION GAP SERPL CALC-SCNC: 7 MMOL/L — SIGNIFICANT CHANGE UP (ref 5–17)
BASOPHILS # BLD AUTO: 0.01 K/UL — SIGNIFICANT CHANGE UP (ref 0–0.2)
BASOPHILS NFR BLD AUTO: 0.2 % — SIGNIFICANT CHANGE UP (ref 0–2)
BUN SERPL-MCNC: 14 MG/DL — SIGNIFICANT CHANGE UP (ref 7–23)
CALCIUM SERPL-MCNC: 7.7 MG/DL — LOW (ref 8.5–10.1)
CHLORIDE SERPL-SCNC: 102 MMOL/L — SIGNIFICANT CHANGE UP (ref 96–108)
CO2 SERPL-SCNC: 23 MMOL/L — SIGNIFICANT CHANGE UP (ref 22–31)
COVID-19 SPIKE DOMAIN AB INTERP: POSITIVE
COVID-19 SPIKE DOMAIN ANTIBODY RESULT: 10.7 U/ML — HIGH
CREAT SERPL-MCNC: 0.74 MG/DL — SIGNIFICANT CHANGE UP (ref 0.5–1.3)
CULTURE RESULTS: SIGNIFICANT CHANGE UP
EOSINOPHIL # BLD AUTO: 0.02 K/UL — SIGNIFICANT CHANGE UP (ref 0–0.5)
EOSINOPHIL NFR BLD AUTO: 0.4 % — SIGNIFICANT CHANGE UP (ref 0–6)
GLUCOSE SERPL-MCNC: 104 MG/DL — HIGH (ref 70–99)
GRAM STN FLD: SIGNIFICANT CHANGE UP
HCT VFR BLD CALC: 26.1 % — LOW (ref 39–50)
HGB BLD-MCNC: 8.6 G/DL — LOW (ref 13–17)
IMM GRANULOCYTES NFR BLD AUTO: 0.7 % — SIGNIFICANT CHANGE UP (ref 0–1.5)
LYMPHOCYTES # BLD AUTO: 0.43 K/UL — LOW (ref 1–3.3)
LYMPHOCYTES # BLD AUTO: 9.6 % — LOW (ref 13–44)
MCHC RBC-ENTMCNC: 33 GM/DL — SIGNIFICANT CHANGE UP (ref 32–36)
MCHC RBC-ENTMCNC: 34.3 PG — HIGH (ref 27–34)
MCV RBC AUTO: 104 FL — HIGH (ref 80–100)
MONOCYTES # BLD AUTO: 0.62 K/UL — SIGNIFICANT CHANGE UP (ref 0–0.9)
MONOCYTES NFR BLD AUTO: 13.9 % — SIGNIFICANT CHANGE UP (ref 2–14)
NEUTROPHILS # BLD AUTO: 3.36 K/UL — SIGNIFICANT CHANGE UP (ref 1.8–7.4)
NEUTROPHILS NFR BLD AUTO: 75.2 % — SIGNIFICANT CHANGE UP (ref 43–77)
NRBC # BLD: 0 /100 WBCS — SIGNIFICANT CHANGE UP (ref 0–0)
PLATELET # BLD AUTO: 68 K/UL — LOW (ref 150–400)
POTASSIUM SERPL-MCNC: 3.8 MMOL/L — SIGNIFICANT CHANGE UP (ref 3.5–5.3)
POTASSIUM SERPL-SCNC: 3.8 MMOL/L — SIGNIFICANT CHANGE UP (ref 3.5–5.3)
RBC # BLD: 2.51 M/UL — LOW (ref 4.2–5.8)
RBC # FLD: 15.7 % — HIGH (ref 10.3–14.5)
SARS-COV-2 IGG+IGM SERPL QL IA: 10.7 U/ML — HIGH
SARS-COV-2 IGG+IGM SERPL QL IA: POSITIVE
SODIUM SERPL-SCNC: 132 MMOL/L — LOW (ref 135–145)
SPECIMEN SOURCE: SIGNIFICANT CHANGE UP
VANCOMYCIN TROUGH SERPL-MCNC: 11.4 UG/ML — SIGNIFICANT CHANGE UP (ref 10–20)
WBC # BLD: 4.47 K/UL — SIGNIFICANT CHANGE UP (ref 3.8–10.5)
WBC # FLD AUTO: 4.47 K/UL — SIGNIFICANT CHANGE UP (ref 3.8–10.5)

## 2021-09-16 PROCEDURE — 99232 SBSQ HOSP IP/OBS MODERATE 35: CPT

## 2021-09-16 RX ORDER — FLUTICASONE PROPIONATE 50 MCG
1 SPRAY, SUSPENSION NASAL EVERY 12 HOURS
Refills: 0 | Status: COMPLETED | OUTPATIENT
Start: 2021-09-16 | End: 2021-09-20

## 2021-09-16 RX ORDER — ALPRAZOLAM 0.25 MG
0.25 TABLET ORAL AT BEDTIME
Refills: 0 | Status: DISCONTINUED | OUTPATIENT
Start: 2021-09-16 | End: 2021-09-20

## 2021-09-16 RX ORDER — CEFAZOLIN SODIUM 1 G
2000 VIAL (EA) INJECTION EVERY 8 HOURS
Refills: 0 | Status: DISCONTINUED | OUTPATIENT
Start: 2021-09-16 | End: 2021-09-20

## 2021-09-16 RX ADMIN — OXYCODONE AND ACETAMINOPHEN 1 TABLET(S): 5; 325 TABLET ORAL at 00:53

## 2021-09-16 RX ADMIN — Medication 2 CAPSULE(S): at 18:12

## 2021-09-16 RX ADMIN — Medication 650 MILLIGRAM(S): at 12:30

## 2021-09-16 RX ADMIN — Medication 200 MILLIGRAM(S): at 06:37

## 2021-09-16 RX ADMIN — Medication 100 MILLIGRAM(S): at 21:14

## 2021-09-16 RX ADMIN — Medication 1 DROP(S): at 11:43

## 2021-09-16 RX ADMIN — Medication 650 MILLIGRAM(S): at 11:46

## 2021-09-16 RX ADMIN — Medication 100 MILLIGRAM(S): at 11:39

## 2021-09-16 RX ADMIN — Medication 1 TABLET(S): at 11:39

## 2021-09-16 RX ADMIN — SACUBITRIL AND VALSARTAN 1 TABLET(S): 24; 26 TABLET, FILM COATED ORAL at 18:10

## 2021-09-16 RX ADMIN — TAMSULOSIN HYDROCHLORIDE 0.4 MILLIGRAM(S): 0.4 CAPSULE ORAL at 21:14

## 2021-09-16 RX ADMIN — DULOXETINE HYDROCHLORIDE 60 MILLIGRAM(S): 30 CAPSULE, DELAYED RELEASE ORAL at 11:37

## 2021-09-16 RX ADMIN — Medication 200 MILLIGRAM(S): at 16:23

## 2021-09-16 RX ADMIN — Medication 100 MILLIGRAM(S): at 15:23

## 2021-09-16 RX ADMIN — Medication 200 MILLIGRAM(S): at 22:24

## 2021-09-16 RX ADMIN — OXYCODONE AND ACETAMINOPHEN 1 TABLET(S): 5; 325 TABLET ORAL at 22:24

## 2021-09-16 RX ADMIN — DORZOLAMIDE HYDROCHLORIDE TIMOLOL MALEATE 1 DROP(S): 20; 5 SOLUTION/ DROPS OPHTHALMIC at 06:33

## 2021-09-16 RX ADMIN — GABAPENTIN 300 MILLIGRAM(S): 400 CAPSULE ORAL at 18:14

## 2021-09-16 RX ADMIN — GABAPENTIN 300 MILLIGRAM(S): 400 CAPSULE ORAL at 06:33

## 2021-09-16 RX ADMIN — OXYCODONE AND ACETAMINOPHEN 1 TABLET(S): 5; 325 TABLET ORAL at 22:54

## 2021-09-16 RX ADMIN — Medication 2 CAPSULE(S): at 08:25

## 2021-09-16 RX ADMIN — RIVAROXABAN 20 MILLIGRAM(S): KIT at 18:13

## 2021-09-16 RX ADMIN — CEFEPIME 100 MILLIGRAM(S): 1 INJECTION, POWDER, FOR SOLUTION INTRAMUSCULAR; INTRAVENOUS at 06:33

## 2021-09-16 RX ADMIN — Medication 166.67 MILLIGRAM(S): at 10:36

## 2021-09-16 RX ADMIN — Medication 40 MILLIGRAM(S): at 18:08

## 2021-09-16 RX ADMIN — Medication 1 SPRAY(S): at 19:38

## 2021-09-16 RX ADMIN — OXYCODONE AND ACETAMINOPHEN 1 TABLET(S): 5; 325 TABLET ORAL at 01:23

## 2021-09-16 RX ADMIN — Medication 2 CAPSULE(S): at 11:38

## 2021-09-16 RX ADMIN — Medication 400 UNIT(S): at 11:40

## 2021-09-16 RX ADMIN — LATANOPROST 1 DROP(S): 0.05 SOLUTION/ DROPS OPHTHALMIC; TOPICAL at 21:15

## 2021-09-16 RX ADMIN — Medication 5 MILLIGRAM(S): at 21:14

## 2021-09-16 RX ADMIN — PANTOPRAZOLE SODIUM 40 MILLIGRAM(S): 20 TABLET, DELAYED RELEASE ORAL at 06:37

## 2021-09-16 RX ADMIN — DORZOLAMIDE HYDROCHLORIDE TIMOLOL MALEATE 1 DROP(S): 20; 5 SOLUTION/ DROPS OPHTHALMIC at 18:08

## 2021-09-16 RX ADMIN — Medication 3 MILLILITER(S): at 00:32

## 2021-09-16 NOTE — PHARMACOTHERAPY INTERVENTION NOTE - COMMENTS
Patient is a 80y M presenting with RLE cellulitis, now with MSSA growth in blood cultures. Patient had been managed empirically with cefepime and vancomycin. Discussed with ID Dr. Bell and suggested de-escalation to cefazolin 2g IV Q8. Patient will require repeat blood cultures to ensure clearance.    Pippa Rivas, PharmD  Clinical Pharmacy Specialist [id number]

## 2021-09-16 NOTE — PROGRESS NOTE ADULT - ASSESSMENT
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     Problem/Plan - 1:  ·  Problem: Cellulitis of right lower leg.   ·  Plan: Patient presents with LLE erythema, edema, and pain likely 2/2 cellulitis  - Admit to F  - Continue IV Vancomycin and IV Cefepime  - XR right foot: no gas seen - soft tissue swelling by hallux  -foot XRays reviewed  - F/u blood cultures, ESR, CRP  - ID (Dr. Bell) consulted, f/u recs  - Podiatry (Dr. López) consulted, f/u recs.     Problem/Plan - 2:  ·  Problem: Injury of right toe.   ·  Plan: Pt follows Dr. Felipe/Rene for R hallux hammertoe dorsal stage 3 pressure injury  - Fall precautions  - Podiatry (Dr. López) consulted, f/u recs.     Problem/Plan - 3:  ·  Problem: HTN (hypertension).   ·  Plan: Chronic, soft BP noted and BP meds were held this morning. Will resume step-wise  - C/w Lasix 20 mg PO qd, Entresto 1 tab BID, Sotalol 40 mg PO BID with hold parameters  - hold Bystolic 5 mg PO qd for now in setting of soft BP  - Monitor routine hemodynamics.     Problem/Plan - 4:  ·  Problem: Pulmonary embolism.   ·  Plan: Bilateral PE in 7/2020  - Continue Xarelto 20 mg PO qd  - Patient is scheduled for Whipple's procedure on 9/23/2021 and was advised to stop Xarelto on 9/20 and start Heparin.     Problem/Plan - 5:  ·  Problem: CHF (congestive heart failure).   ·  Plan: Chronic systolic CHF s/p AICD  - Admit to F  - Given IV NS 2.5 bolus x1 in ED  - On Lasix 20 mg PO qd, Entresto 1 tab BID, Sotalol 40 mg PO BID, Bystolic 5 mg PO qd with hold parameters  - Tolerating Room air  -no signs of acute volume overload  - Strict I/Os, daily weights  - Monitor and replace electrolytes.     Problem/Plan - 6:  ·  Problem: S/P TAVR (transcatheter aortic valve replacement).   ·  Plan: S/p TAVR in 4/2021  - Stable.     Problem/Plan - 7:  ·  Problem: Anemia.   ·  Plan: Likely anemia of chronic disease in the setting of pancreatic cancer  - H/H 9.8/30.0 on admission, baseline hemoglobin unknown  - Currently hemodynamically stable, monitor for signs and symptoms of active bleeding  - Consider transfusion for hemoglobin <8  -continue to monitor thrombocytopenia.     Problem/Plan - 8:  ·  Problem: CAD (coronary artery disease).   ·  Plan: Chronic, s/p cardiac stents x2 (15 years ago), AICD, TAVR (4/2021), bilateral PE (7/2021) on Xarelto  - Continue home medication Xarelto 20 mg PO qd.     Problem/Plan - 9:  ·  Problem: Pancreatic cancer.   ·  Plan: Chronic, diagnosed in March 2021 on chemotherapy (does not recall name of medication)  - Continue home medication Pancrelipase 36,000 2 tabs PO TID with meals  - Pt scheduled for Whipple's procedure on 9/23/2021 with Dr. Shon Murphy at [facility].     Problem/Plan - 10:  ·  Problem: Need for prophylactic measure.   ·  Plan; VTE ppx: Continue home Xarelto 20 mg PO qd    11. Hyponatremia: Resolved  12. BPH: Continue home Flomax 0.4 mg PO qhs  13. Macular degeneration: Continue home Dorzolamide/Timolol, Ketorolac, Latanoprost  14. GERD: Continue therapeutic interchange for home Omeprazole 20 mg PO qd - Protonix 40 mg PO qd  15. Neuropathy: Continue home Gabapentin 300 mg PO BID    IMPROVE VTE Individual Risk Assessment        RISK                                                          Points  [ x ] Previous VTE                                                3  [  ] Thrombophilia                                             2  [  ] Lower limb paralysis                                   2        (unable to hold up >15 seconds)    [ x ] Current Cancer                                             2         (within 6 months)  [  ] Immobilization > 24 hrs                              1  [  ] ICU/CCU stay > 24 hours                             1  [ x ] Age > 60                                                         1  IMPROVE VTE Score: 6. 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     Problem/Plan - 1:  ·  Problem: Cellulitis of right lower leg.   ·  Plan: Patient presents with LLE erythema, edema, and pain likely 2/2 cellulitis  - Admit to F  - abx continue IV Cefepime  - XR right foot: no gas seen - soft tissue swelling by hallux  -foot XRays reviewed  - blood cultures growing G+CC, ESR, CRP  - ID (Dr. Bell) consulted, f/u recs  - Podiatry (Dr. López) consulted, f/u recs.& wound care& pain control     Problem/Plan - 2:  ·  Problem: Injury of right toe.   ·  Plan: Pt follows Dr. Felipe/Rene for R hallux hammertoe dorsal stage 3 pressure injury  - Fall precautions  - Podiatry (Dr. López) consulted, f/u recs.     Problem/Plan - 3:  ·  Problem: HTN (hypertension).   ·  Plan: Chronic, soft BP noted and BP meds were held this morning. Will resume step-wise  - C/w Lasix 20 mg PO qd, Entresto 1 tab BID, Sotalol 40 mg PO BID with hold parameters  - BP stable 146/78, continue to monitor off Bystolic  at home on 5 mg PO qd )  - Monitor routine hemodynamics.     Problem/Plan - 4:  ·  Problem: Pulmonary embolism.   ·  Plan: Bilateral PE in 7/2020  - Continue Xarelto 20 mg PO qd  - Patient is scheduled for Whipple's procedure on 9/23/2021 and was advised to stop Xarelto on 9/20 and start Heparin.     Problem/Plan - 5:  ·  Problem: CHF (congestive heart failure).   ·  Plan: Chronic systolic CHF s/p AICD  - Admit to [facility]  - Given IV NS 2.5 bolus x1 in ED  - On Lasix 20 mg PO qd, Entresto 1 tab BID, Sotalol 40 mg PO BID, Bystolic 5 mg PO qd with hold parameters  - Tolerating Room air  -no signs of acute volume overload  - Strict I/Os, daily weights  - Monitor and replace electrolytes.     Problem/Plan - 6:  ·  Problem: S/P TAVR (transcatheter aortic valve replacement).   ·  Plan: S/p TAVR in 4/2021  - Stable.     Problem/Plan - 7:  ·  Problem: Anemia.   ·  Plan: Likely anemia of chronic disease in the setting of pancreatic cancer  - H/H 9.8/30.0 on admission, baseline hemoglobin unknown  - Currently hemodynamically stable, monitor for signs and symptoms of active bleeding  - Consider transfusion for hemoglobin <8  -continue to monitor thrombocytopenia.     Problem/Plan - 8:  ·  Problem: CAD (coronary artery disease).   ·  Plan: Chronic, s/p cardiac stents x2 (15 years ago), AICD, TAVR (4/2021), bilateral PE (7/2021) on Xarelto  - Continue home medication Xarelto 20 mg PO qd.     Problem/Plan - 9:  ·  Problem: Pancreatic cancer.   ·  Plan: Chronic, diagnosed in March 2021 on chemotherapy (does not recall name of medication)  - Continue home medication Pancrelipase 36,000 2 tabs PO TID with meals  - Pt scheduled for Whipple's procedure on 9/23/2021 with Dr. Shon Murphy at [place].     Problem/Plan - 10:  ·  Problem: Need for prophylactic measure.   ·  Plan; VTE ppx: Continue home Xarelto 20 mg PO qd    11. Hyponatremia: Resolved  12. BPH: Continue home Flomax 0.4 mg PO qhs  13. Macular degeneration: Continue home Dorzolamide/Timolol, Ketorolac, Latanoprost  14. GERD: Continue therapeutic interchange for home Omeprazole 20 mg PO qd - Protonix 40 mg PO qd  15. Neuropathy: Continue home Gabapentin 300 mg PO BID    IMPROVE VTE Individual Risk Assessment        RISK                                                          Points  [ x ] Previous VTE                                                3  [  ] Thrombophilia                                             2  [  ] Lower limb paralysis                                   2        (unable to hold up >15 seconds)    [ x ] Current Cancer                                             2         (within 6 months)  [  ] Immobilization > 24 hrs                              1  [  ] ICU/CCU stay > 24 hours                             1  [ x ] Age > 60                                                         1  IMPROVE VTE Score: 6. 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     Problem/Plan - 1:  ·  Problem: Cellulitis of right lower leg.   ·  Plan: Patient presents with LLE erythema, edema, and pain likely 2/2 cellulitis     Blood culture with MSSA  - Wound culture pending   - Podiatry/wound care follow up  - ID recs appreciated -Stop cefepime and vancomycin and start cefazolin 2gm q8h  - TTE  - Follow up bone scan result and Bone biopsy.   - Wound care as per Podiatry  - Pain control  - Elevated leg to alleviate pain&swelling     Problem/Plan - 2:  ·  Problem: Injury of right toe.   ·  Plan: Pt follows Dr. Felipe/Rene for R hallux hammertoe dorsal stage 3 pressure injury  - Fall precautions  - Podiatry (Dr. López) consulted, f/u recs.     Problem/Plan - 3:  ·  Problem: HTN (hypertension).   ·  Plan: Chronic, soft BP noted and BP meds were held this morning. Will resume step-wise  - C/w Lasix 20 mg PO qd, Entresto 1 tab BID, Sotalol 40 mg PO BID with hold parameters  - BP stable 146/78, continue to monitor off Bystolic ( at home on 5 mg PO qd )  - Monitor routine hemodynamics.     Problem/Plan - 4:  ·  Problem: Pulmonary embolism.   ·  Plan: Bilateral PE in 7/2020  - Continue Xarelto 20 mg PO qd  - Patient is scheduled for Whipple's procedure on 9/23/2021 and was advised to stop Xarelto on 9/20 and start Heparin.     Problem/Plan - 5:  ·  Problem: CHF (congestive heart failure).   ·  Plan: Chronic systolic CHF s/p AICD  - Admit to F  - Given IV NS 2.5 bolus x1 in ED  - On Lasix 20 mg PO qd, Entresto 1 tab BID, Sotalol 40 mg PO BID, Bystolic 5 mg PO qd with hold parameters  - Tolerating Room air  -no signs of acute volume overload  - Strict I/Os, daily weights  - Monitor and replace electrolytes.     Problem/Plan - 6:  ·  Problem: S/P TAVR (transcatheter aortic valve replacement).   ·  Plan: S/p TAVR in 4/2021  - Stable.     Problem/Plan - 7:  ·  Problem: Anemia.   ·  Plan: Likely anemia of chronic disease in the setting of pancreatic cancer  - H/H 9.8/30.0 on admission, baseline hemoglobin unknown  - Currently hemodynamically stable, monitor for signs and symptoms of active bleeding  - Consider transfusion for hemoglobin <8  -continue to monitor thrombocytopenia.     Problem/Plan - 8:  ·  Problem: CAD (coronary artery disease).   ·  Plan: Chronic, s/p cardiac stents x2 (15 years ago), AICD, TAVR (4/2021), bilateral PE (7/2021) on Xarelto  - Continue home medication Xarelto 20 mg PO qd.     Problem/Plan - 9:  ·  Problem: Pancreatic cancer.   ·  Plan: Chronic, diagnosed in March 2021 on chemotherapy (does not recall name of medication)  - Continue home medication Pancrelipase 36,000 2 tabs PO TID with meals  - Pt scheduled for Whipple's procedure on 9/23/2021 with Dr. Shon Murphy at [place].     Problem/Plan - 10:  ·  Problem: Need for prophylactic measure.   ·  Plan; VTE ppx: Continue home Xarelto 20 mg PO qd    11. Hyponatremia: Resolved  12. BPH: Continue home Flomax 0.4 mg PO qhs  13. Macular degeneration: Continue home Dorzolamide/Timolol, Ketorolac, Latanoprost  14. GERD: Continue therapeutic interchange for home Omeprazole 20 mg PO qd - Protonix 40 mg PO qd  15. Neuropathy: Continue home Gabapentin 300 mg PO BID  16. Resume Xanax 0.25mg QHS prn for anxiety  17. Start Benzonatate 100mg tid, Flonasal nasal spray bid for cough sec to post nasal drip, continue Robitussin prn, head end elevation 7 out of bed to chair as tolerated  to alleviate cough    IMPROVE VTE Individual Risk Assessment        RISK                                                          Points  [ x ] Previous VTE                                                3  [  ] Thrombophilia                                             2  [  ] Lower limb paralysis                                   2        (unable to hold up >15 seconds)    [ x ] Current Cancer                                             2         (within 6 months)  [  ] Immobilization > 24 hrs                              1  [  ] ICU/CCU stay > 24 hours                             1  [ x ] Age > 60                                                         1  IMPROVE VTE Score: 6.

## 2021-09-16 NOTE — PROGRESS NOTE ADULT - SUBJECTIVE AND OBJECTIVE BOX
80y year old Male seen at [facility] bedside for Right Hallux infection, possible Osteomyelitis- s/p Right Hallux Bone Biopsy and Wound Debridement by Dr. López on 9/15/2021, . The patient states that he had this wound for more than several months. 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 The patient states he has been following at the [place] wound care center/ hyperbaric center and he has been receiving local wound care. The patient states that his daughter has been doing the dressing changes at home. The patient was sent from the wound care center to the ED for further evaluation and to receive IV antibiotics. The patient also states that he has a whipple procedure that is scheduled to be performed in two weeks. Denies any fever, chills, nausea, vomiting, chest pain, shortness of breath, or calf pain at this time.    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    Benign testicular tumor    History of hip replacement        Allergies    No Known Allergies    Intolerances        MEDICATIONS  (STANDING):  cefepime   IVPB 2000 milliGRAM(s) IV Intermittent every 8 hours  cholecalciferol 400 Unit(s) Oral daily  dorzolamide 2%/timolol 0.5% Ophthalmic Solution 1 Drop(s) Both EYES two times a day  DULoxetine 60 milliGRAM(s) Oral daily  furosemide    Tablet 20 milliGRAM(s) Oral daily  gabapentin 300 milliGRAM(s) Oral two times a day  ketorolac 0.5% Ophthalmic Solution 1 Drop(s) Both EYES daily  latanoprost 0.005% Ophthalmic Solution 1 Drop(s) Both EYES at bedtime  melatonin 5 milliGRAM(s) Oral at bedtime  multivitamin 1 Tablet(s) Oral daily  pancrelipase  (CREON 36,000 Lipase Units) 2 Capsule(s) Oral three times a day with meals  pantoprazole    Tablet 40 milliGRAM(s) Oral before breakfast  pyridoxine 100 milliGRAM(s) Oral daily  rivaroxaban 20 milliGRAM(s) Oral with dinner  sacubitril 49 mG/valsartan 51 mG 1 Tablet(s) Oral two times a day  sotalol 40 milliGRAM(s) Oral two times a day  tamsulosin 0.4 milliGRAM(s) Oral at bedtime  vancomycin  IVPB 1250 milliGRAM(s) IV Intermittent every 12 hours    MEDICATIONS  (PRN):  acetaminophen   Tablet .. 650 milliGRAM(s) Oral every 6 hours PRN Temp greater or equal to 38C (100.4F), Mild Pain (1 - 3)      Social History:  Tobacco: denies  EtOH: occasional  Recreational drug use: denies  Lives: alone  Ambulates: cane  ADLs: independent  Vaccinations: Pf[employer] x2 2/2021 (14 Sep 2021 23:11)      FAMILY HISTORY:  Family history of stroke (Mother)      Vital Signs Last 24 Hrs  T(C): 36.5 (16 Sep 2021 13:15), Max: 38.8 (15 Sep 2021 21:10)  T(F): 97.7 (16 Sep 2021 13:15), Max: 101.9 (15 Sep 2021 21:10)  HR: 84 (16 Sep 2021 13:15) (77 - 99)  BP: 146/78 (16 Sep 2021 13:15) (100/64 - 146/78)  BP(mean): --  RR: 18 (16 Sep 2021 13:15) (17 - 18)  SpO2: 98% (16 Sep 2021 13:15) (94% - 98%)    PHYSICAL EXAM:  Vascular: DP & PT faintly palpable bilaterally, Capillary refill 3 seconds, No Digital hair present bilaterally, edema and erythema along the right hallux, skin temperature slightly warmer compared to the contralateral   Neurological: Loss of protective sensation b/l  Musculoskeletal: 5/5 strength in all quadrants bilaterally, AJ & STJ ROM intact, no pain upon palpation of the right hallux, rigid non reducible digital contracture at the IPJ of the right hallux.  Dermatological:   Pre- Debridement: Bullae along the distal lateral aspect of the right hallux, more than 1 cmx 1cm in size with serous drainage and full thickness wound along the anterior medial aspect of the right hallux - size 0.2cmx0.2cm- macerated borders, red granular base, moderate serous sanguineous drainage, does not probe to bone, no tunneling no undermining , no malodoro  Post Debridement & Bedside Bone Biopsy- Full thickness wound along the dorsal aspect of the right hallux, size 1.5cmx0.2cmx0.2cm- probes to bone, partial thickness wound along the distal lateral aspect after drainage of more than 2cc of serous fluid, probes to bone, tunneling and undermining, no malodor and sanguineous drainage     CBC Full  -  ( 16 Sep 2021 08:00 )  WBC Count : 4.47 K/uL  RBC Count : 2.51 M/uL  Hemoglobin : 8.6 g/dL  Hematocrit : 26.1 %  Platelet Count - Automated : 68 K/uL  Mean Cell Volume : 104.0 fl  Mean Cell Hemoglobin : 34.3 pg  Mean Cell Hemoglobin Concentration : 33.0 gm/dL  Auto Neutrophil # : 3.36 K/uL  Auto Lymphocyte # : 0.43 K/uL  Auto Monocyte # : 0.62 K/uL  Auto Eosinophil # : 0.02 K/uL  Auto Basophil # : 0.01 K/uL  Auto Neutrophil % : 75.2 %  Auto Lymphocyte % : 9.6 %  Auto Monocyte % : 13.9 %  Auto Eosinophil % : 0.4 %  Auto Basophil % : 0.2 %      ----------CHEM PANEL----------    09-16    132<L>  |  102  |  14  ----------------------------<  104<H>  3.8   |  23  |  0.74    Ca    7.7<L>      16 Sep 2021 08:00    TPro  6.4  /  Alb  2.7<L>  /  TBili  1.0  /  DBili  x   /  AST  15  /  ALT  16  /  AlkPhos  101  09-15          Imaging:   EXAM: XR FOOT COMP MIN 3 VIEWS RT    EXAM: XR ANKLE COMP MIN 3 VIEWS RT    EXAM: XR CHEST AP OR PA 1V    EXAM: XR TIB FIB AP LAT 2 VIEWS RT      PROCEDURE DATE: 09/14/2021        INTERPRETATION: Chest and right tib-fib, ankle, and foot. Patient has cellulitis of the right leg and wound care the foot. There is history of pancreatic cancer.    AP chest on September 14, 2021 at 8:51 PM.    Heart magnified by technique. Extensive is thoracolumbar hardware left-sided [id number] remain.    Mild right thoracic curve again noted.    Lungs remain clear.    Present study shows a right MediPort new since June 12, 2020.      Right tib-fib. AP and lateral views. Arterial calcifications. No knee effusion.    Moderate to advanced knee degeneration. No bone destruction or fracture.    Right ankle. 3 views. Arterial calcification and ankle edema. No bone destruction or fracture.    Right foot. 3 views.    Partial subluxation of the right first MTP joint with associated degeneration again noted.    Scattered slight IP degeneration again seen. No fracture or radiographic bone destruction.    Right foot is similar to August 4.    IMPRESSION: No acute chest finding. Ankle edema and degenerative changes in the foot again seen. Fairly advanced right knee degeneration also again noted.    --- End of Report ---            JODI GURROLA MD; Attending Radiologist  This document has been electronically signed. Sep 15 2021 11:45AM

## 2021-09-16 NOTE — PROGRESS NOTE ADULT - PROBLEM SELECTOR PLAN 1
- patient seen and evaluated  - Wound dressing taken off with care and to patient's tolerance  - wound irrigated with normal saline and area pat dry with sterile gauze  -Wound dressed with Aquacel ag and DSD  - please reference above  - follow up with the bone scan results  - follow up with the bone biopsy results  - follow up with infectious disease recommendations  - Discussed with patient the high chances that bone pathology comes back as osteomyelitis, clinically the toe correlates with osteomyelitis, patient may require surgical intervention of right hallux amputation after confirmed bone biopsy results- awaiting results   - podiatry team will continue to follow patient while in house

## 2021-09-16 NOTE — PROGRESS NOTE ADULT - SUBJECTIVE AND OBJECTIVE BOX
Patient is a 80y old  Male who presents with a chief complaint of right hallux ulcer/cellulitis (15 Sep 2021 15:19)      INTERVAL HPI/OVERNIGHT EVENTS:    MEDICATIONS  (STANDING):  cefepime   IVPB 2000 milliGRAM(s) IV Intermittent every 8 hours  cholecalciferol 400 Unit(s) Oral daily  dorzolamide 2%/timolol 0.5% Ophthalmic Solution 1 Drop(s) Both EYES two times a day  DULoxetine 60 milliGRAM(s) Oral daily  furosemide    Tablet 20 milliGRAM(s) Oral daily  gabapentin 300 milliGRAM(s) Oral two times a day  ketorolac 0.5% Ophthalmic Solution 1 Drop(s) Both EYES daily  latanoprost 0.005% Ophthalmic Solution 1 Drop(s) Both EYES at bedtime  melatonin 5 milliGRAM(s) Oral at bedtime  multivitamin 1 Tablet(s) Oral daily  pancrelipase  (CREON 36,000 Lipase Units) 2 Capsule(s) Oral three times a day with meals  pantoprazole    Tablet 40 milliGRAM(s) Oral before breakfast  pyridoxine 100 milliGRAM(s) Oral daily  rivaroxaban 20 milliGRAM(s) Oral with dinner  sacubitril 49 mG/valsartan 51 mG 1 Tablet(s) Oral two times a day  sotalol 40 milliGRAM(s) Oral two times a day  tamsulosin 0.4 milliGRAM(s) Oral at bedtime  vancomycin  IVPB 1250 milliGRAM(s) IV Intermittent every 12 hours    MEDICATIONS  (PRN):  acetaminophen   Tablet .. 650 milliGRAM(s) Oral every 6 hours PRN Temp greater or equal to 38C (100.4F), Mild Pain (1 - 3)  guaiFENesin Oral Liquid (Sugar-Free) 200 milliGRAM(s) Oral every 6 hours PRN Cough  oxycodone    5 mG/acetaminophen 325 mG 1 Tablet(s) Oral every 6 hours PRN Moderate Pain (4 - 6)      Allergies    No Known Allergies    Intolerances        REVIEW OF SYSTEMS:  CONSTITUTIONAL: No fever, weight loss, or fatigue  EYES: No eye pain, visual disturbances, or discharge  ENMT:  No difficulty hearing, tinnitus, vertigo; No sinus or throat pain  NECK: No pain or stiffness  BREASTS: No pain, masses, or nipple discharge  RESPIRATORY: No cough, wheezing, chills or hemoptysis; No shortness of breath  CARDIOVASCULAR: No chest pain, palpitations, lightheadedness, or leg swelling  GASTROINTESTINAL: No abdominal or epigastric pain. No nausea, vomiting, or hematemesis; No diarrhea or constipation. No melena or hematochezia.  GENITOURINARY: No dysuria, frequency, hematuria, or incontinence  NEUROLOGICAL: No headaches, memory loss, vertigo, loss of strength, numbness, or tremors  SKIN: No itching, burning, rashes, or lesions   LYMPH NODES: No enlarged glands  ENDOCRINE: No heat or cold intolerance; No hair loss; No polydipsia or polyuria  MUSCULOSKELETAL: No joint pain or swelling; No muscle, back, or extremity pain  PSYCHIATRIC: No depression, anxiety, or mood swings  HEME/LYMPH: No easy bruising, or bleeding gums  ALLERGY AND IMMUNOLOGIC: No hives or eczema    Vital Signs Last 24 Hrs  T(C): 36.4 (16 Sep 2021 05:22), Max: 38.8 (15 Sep 2021 21:10)  T(F): 97.6 (16 Sep 2021 05:22), Max: 101.9 (15 Sep 2021 21:10)  HR: 77 (16 Sep 2021 05:22) (76 - 99)  BP: 100/64 (16 Sep 2021 05:22) (100/64 - 144/79)  BP(mean): --  RR: 17 (16 Sep 2021 05:22) (15 - 17)  SpO2: 94% (16 Sep 2021 05:22) (94% - 98%)    PHYSICAL EXAM:  GENERAL: NAD, well-groomed, well-developed  HEAD:  Atraumatic, Normocephalic  EYES: EOMI, PERRLA, conjunctiva and sclera clear  ENMT: Moist mucous membranes, Good dentition, No lesions; No tonsillar erythema, exudates, or enlargement  NECK: Supple, No JVD, Normal thyroid  NERVOUS SYSTEM:  Alert & Oriented X3, Good concentration; All 4 extremities mobile, no gross sensory deficits.   CHEST/LUNG: Clear to auscultation bilaterally; No rales, rhonchi, wheezing, or rubs  HEART: Regular rate and rhythm; No murmurs, rubs, or gallops  ABDOMEN: Soft, Nontender, Nondistended; Bowel sounds present  EXTREMITIES:  2+ Peripheral Pulses, No clubbing, cyanosis, or edema  LYMPH: No lymphadenopathy noted  SKIN: No rashes or lesions    LABS:                        8.6    4.47  )-----------( 68       ( 16 Sep 2021 08:00 )             26.1     16 Sep 2021 08:00    132    |  102    |  14     ----------------------------<  104    3.8     |  23     |  0.74     Ca    7.7        16 Sep 2021 08:00      PT/INR - ( 14 Sep 2021 20:17 )   PT: 31.4 sec;   INR: 2.82 ratio         PTT - ( 14 Sep 2021 20:17 )  PTT:42.6 sec  Urinalysis Basic - ( 15 Sep 2021 02:08 )    Color: Yellow / Appearance: Clear / S.010 / pH: x  Gluc: x / Ketone: Negative  / Bili: Negative / Urobili: 1   Blood: x / Protein: 30 mg/dL / Nitrite: Negative   Leuk Esterase: Negative / RBC: 0-2 /HPF / WBC 0-2   Sq Epi: x / Non Sq Epi: Occasional / Bacteria: x      CAPILLARY BLOOD GLUCOSE          RADIOLOGY & ADDITIONAL TESTS:    Imaging Personally Reviewed:  [ ] YES     Consultant(s) Notes Reviewed:      Care Discussed with Consultants/Other Providers:    Advanced Directives: [ ] DNR  [ ] No feeding tube  [ ] MOLST in chart  [ ] MOLST completed today  [ ] Unknown   Patient is a 80y old  Male who presents with a chief complaint of right hallux ulcer/cellulitis (15 Sep 2021 15:19)      INTERVAL HPI/OVERNIGHT EVENTS: patient seen and examined with daughter at bedside, appears comfortable, pain controlled on current regimen. Endorses a cough with clear phlegm, denies CP/SOB    MEDICATIONS  (STANDING):  cefepime   IVPB 2000 milliGRAM(s) IV Intermittent every 8 hours  cholecalciferol 400 Unit(s) Oral daily  dorzolamide 2%/timolol 0.5% Ophthalmic Solution 1 Drop(s) Both EYES two times a day  DULoxetine 60 milliGRAM(s) Oral daily  furosemide    Tablet 20 milliGRAM(s) Oral daily  gabapentin 300 milliGRAM(s) Oral two times a day  ketorolac 0.5% Ophthalmic Solution 1 Drop(s) Both EYES daily  latanoprost 0.005% Ophthalmic Solution 1 Drop(s) Both EYES at bedtime  melatonin 5 milliGRAM(s) Oral at bedtime  multivitamin 1 Tablet(s) Oral daily  pancrelipase  (CREON 36,000 Lipase Units) 2 Capsule(s) Oral three times a day with meals  pantoprazole    Tablet 40 milliGRAM(s) Oral before breakfast  pyridoxine 100 milliGRAM(s) Oral daily  rivaroxaban 20 milliGRAM(s) Oral with dinner  sacubitril 49 mG/valsartan 51 mG 1 Tablet(s) Oral two times a day  sotalol 40 milliGRAM(s) Oral two times a day  tamsulosin 0.4 milliGRAM(s) Oral at bedtime  vancomycin  IVPB 1250 milliGRAM(s) IV Intermittent every 12 hours    MEDICATIONS  (PRN):  acetaminophen   Tablet .. 650 milliGRAM(s) Oral every 6 hours PRN Temp greater or equal to 38C (100.4F), Mild Pain (1 - 3)  guaiFENesin Oral Liquid (Sugar-Free) 200 milliGRAM(s) Oral every 6 hours PRN Cough  oxycodone    5 mG/acetaminophen 325 mG 1 Tablet(s) Oral every 6 hours PRN Moderate Pain (4 - 6)      Allergies    No Known Allergies    Intolerances        REVIEW OF SYSTEMS:  CONSTITUTIONAL: No fever, weight loss, or fatigue  EYES: No eye pain, visual disturbances, or discharge  ENMT:  No difficulty hearing, tinnitus, vertigo; No sinus or throat pain  NECK: No pain or stiffness  BREASTS: No pain, masses, or nipple discharge  RESPIRATORY: No cough, wheezing, chills or hemoptysis; No shortness of breath  CARDIOVASCULAR: No chest pain, palpitations, lightheadedness, or leg swelling  GASTROINTESTINAL: No abdominal or epigastric pain. No nausea, vomiting, or hematemesis; No diarrhea or constipation. No melena or hematochezia.  GENITOURINARY: No dysuria, frequency, hematuria, or incontinence  NEUROLOGICAL: No headaches, memory loss, vertigo, loss of strength, numbness, or tremors  SKIN: No itching, burning, rashes, or lesions   LYMPH NODES: No enlarged glands  ENDOCRINE: No heat or cold intolerance; No hair loss; No polydipsia or polyuria  MUSCULOSKELETAL: No joint pain or swelling; No muscle, back, or extremity pain  PSYCHIATRIC: No depression, anxiety, or mood swings  HEME/LYMPH: No easy bruising, or bleeding gums  ALLERGY AND IMMUNOLOGIC: No hives or eczema    Vital Signs Last 24 Hrs  T(C): 36.4 (16 Sep 2021 05:22), Max: 38.8 (15 Sep 2021 21:10)  T(F): 97.6 (16 Sep 2021 05:22), Max: 101.9 (15 Sep 2021 21:10)  HR: 77 (16 Sep 2021 05:22) (76 - 99)  BP: 100/64 (16 Sep 2021 05:22) (100/64 - 144/79)  BP(mean): --  RR: 17 (16 Sep 2021 05:22) (15 - 17)  SpO2: 94% (16 Sep 2021 05:22) (94% - 98%)    PHYSICAL EXAM:  GENERAL: NAD, well-groomed, well-developed  HEAD:  Atraumatic, Normocephalic  EYES: EOMI, PERRLA, conjunctiva and sclera clear  ENMT: Moist mucous membranes, Good dentition, No lesions; No tonsillar erythema, exudates, or enlargement  NECK: Supple, No JVD, Normal thyroid  NERVOUS SYSTEM:  Alert & Oriented X3, Good concentration; All 4 extremities mobile, no gross sensory deficits.   CHEST/LUNG: Clear to auscultation bilaterally; No rales, rhonchi, wheezing, or rubs  HEART: Regular rate and rhythm; No murmurs, rubs, or gallops  ABDOMEN: Soft, Nontender, Nondistended; Bowel sounds present  EXTREMITIES:  2+ Peripheral Pulses, No clubbing, cyanosis, or edema  LYMPH: No lymphadenopathy noted  SKIN: No rashes or lesions    LABS:                        8.6    4.47  )-----------( 68       ( 16 Sep 2021 08:00 )             26.1     16 Sep 2021 08:00    132    |  102    |  14     ----------------------------<  104    3.8     |  23     |  0.74     Ca    7.7        16 Sep 2021 08:00      PT/INR - ( 14 Sep 2021 20:17 )   PT: 31.4 sec;   INR: 2.82 ratio         PTT - ( 14 Sep 2021 20:17 )  PTT:42.6 sec  Urinalysis Basic - ( 15 Sep 2021 02:08 )    Color: Yellow / Appearance: Clear / S.010 / pH: x  Gluc: x / Ketone: Negative  / Bili: Negative / Urobili: 1   Blood: x / Protein: 30 mg/dL / Nitrite: Negative   Leuk Esterase: Negative / RBC: 0-2 /HPF / WBC 0-2   Sq Epi: x / Non Sq Epi: Occasional / Bacteria: x      CAPILLARY BLOOD GLUCOSE          RADIOLOGY & ADDITIONAL TESTS:    Imaging Personally Reviewed:  [ ] YES     Consultant(s) Notes Reviewed:      Care Discussed with Consultants/Other Providers:    Advanced Directives: [ ] DNR  [ ] No feeding tube  [ ] MOLST in chart  [ ] MOLST completed today  [ ] Unknown   Patient is a 80y old  Male who presents with a chief complaint of right hallux ulcer/cellulitis (15 Sep 2021 15:19)      INTERVAL HPI/OVERNIGHT EVENTS: patient seen and examined with daughter at bedside, appears comfortable, pain controlled on current regimen. Endorses a cough with clear phlegm, denies CP/SOB/fever,chills.     MEDICATIONS  (STANDING):  cefepime   IVPB 2000 milliGRAM(s) IV Intermittent every 8 hours  cholecalciferol 400 Unit(s) Oral daily  dorzolamide 2%/timolol 0.5% Ophthalmic Solution 1 Drop(s) Both EYES two times a day  DULoxetine 60 milliGRAM(s) Oral daily  furosemide    Tablet 20 milliGRAM(s) Oral daily  gabapentin 300 milliGRAM(s) Oral two times a day  ketorolac 0.5% Ophthalmic Solution 1 Drop(s) Both EYES daily  latanoprost 0.005% Ophthalmic Solution 1 Drop(s) Both EYES at bedtime  melatonin 5 milliGRAM(s) Oral at bedtime  multivitamin 1 Tablet(s) Oral daily  pancrelipase  (CREON 36,000 Lipase Units) 2 Capsule(s) Oral three times a day with meals  pantoprazole    Tablet 40 milliGRAM(s) Oral before breakfast  pyridoxine 100 milliGRAM(s) Oral daily  rivaroxaban 20 milliGRAM(s) Oral with dinner  sacubitril 49 mG/valsartan 51 mG 1 Tablet(s) Oral two times a day  sotalol 40 milliGRAM(s) Oral two times a day  tamsulosin 0.4 milliGRAM(s) Oral at bedtime  vancomycin  IVPB 1250 milliGRAM(s) IV Intermittent every 12 hours    MEDICATIONS  (PRN):  acetaminophen   Tablet .. 650 milliGRAM(s) Oral every 6 hours PRN Temp greater or equal to 38C (100.4F), Mild Pain (1 - 3)  guaiFENesin Oral Liquid (Sugar-Free) 200 milliGRAM(s) Oral every 6 hours PRN Cough  oxycodone    5 mG/acetaminophen 325 mG 1 Tablet(s) Oral every 6 hours PRN Moderate Pain (4 - 6)      Allergies    No Known Allergies    Intolerances        REVIEW OF SYSTEMS:  CONSTITUTIONAL: No fever, weight loss, or fatigue  EYES: No eye pain, visual disturbances, or discharge  ENMT:  No difficulty hearing, tinnitus, vertigo; No sinus or throat pain  NECK: No pain or stiffness  BREASTS: No pain, masses, or nipple discharge  RESPIRATORY: +cough, no wheezing, chills or hemoptysis; No shortness of breath  CARDIOVASCULAR: No chest pain, palpitations, lightheadedness, or leg swelling  GASTROINTESTINAL: No abdominal or epigastric pain. No nausea, vomiting, or hematemesis; No diarrhea or constipation. No melena or hematochezia.  GENITOURINARY: No dysuria, frequency, hematuria, or incontinence  NEUROLOGICAL: No headaches, memory loss, vertigo, loss of strength, numbness, or tremors  SKIN: No itching, burning, rashes, or lesions   LYMPH NODES: No enlarged glands  ENDOCRINE: No heat or cold intolerance; No hair loss; No polydipsia or polyuria  MUSCULOSKELETAL: No joint pain or swelling; No muscle, back, or extremity pain  PSYCHIATRIC: No depression, anxiety, or mood swings  HEME/LYMPH: No easy bruising, or bleeding gums  ALLERGY AND IMMUNOLOGIC: No hives or eczema    Vital Signs Last 24 Hrs  T(C): 36.4 (16 Sep 2021 05:22), Max: 38.8 (15 Sep 2021 21:10)  T(F): 97.6 (16 Sep 2021 05:22), Max: 101.9 (15 Sep 2021 21:10)  HR: 77 (16 Sep 2021 05:22) (76 - 99)  BP: 100/64 (16 Sep 2021 05:22) (100/64 - 144/79)  BP(mean): --  RR: 17 (16 Sep 2021 05:22) (15 - 17)  SpO2: 94% (16 Sep 2021 05:22) (94% - 98%)    PHYSICAL EXAM:  GENERAL: NAD, well-groomed, well-developed  HEAD:  Atraumatic, Normocephalic  EYES: EOMI, PERRLA, conjunctiva and sclera clear  ENMT: Moist mucous membranes, +post nasal drip, No lesions; No tonsillar erythema, exudates, or enlargement  NECK: Supple, No JVD, Normal thyroid  NERVOUS SYSTEM:  Alert & Oriented X3, Good concentration; All 4 extremities mobile, no gross sensory deficits.   CHEST/LUNG: Clear to auscultation bilaterally; No rales, rhonchi, wheezing, or rubs  HEART: Regular rate and rhythm; No murmurs, rubs, or gallops  ABDOMEN: Soft, Nontender, Nondistended; Bowel sounds present  EXTREMITIES:  2+ Peripheral Pulses, No clubbing, cyanosis, or edema  LYMPH: No lymphadenopathy noted  SKIN: No rashes or lesions    LABS:                        8.6    4.47  )-----------( 68       ( 16 Sep 2021 08:00 )             26.1     16 Sep 2021 08:00    132    |  102    |  14     ----------------------------<  104    3.8     |  23     |  0.74     Ca    7.7        16 Sep 2021 08:00      PT/INR - ( 14 Sep 2021 20:17 )   PT: 31.4 sec;   INR: 2.82 ratio         PTT - ( 14 Sep 2021 20:17 )  PTT:42.6 sec  Urinalysis Basic - ( 15 Sep 2021 02:08 )    Color: Yellow / Appearance: Clear / S.010 / pH: x  Gluc: x / Ketone: Negative  / Bili: Negative / Urobili: 1   Blood: x / Protein: 30 mg/dL / Nitrite: Negative   Leuk Esterase: Negative / RBC: 0-2 /HPF / WBC 0-2   Sq Epi: x / Non Sq Epi: Occasional / Bacteria: x      CAPILLARY BLOOD GLUCOSE          RADIOLOGY & ADDITIONAL TESTS:    Imaging Personally Reviewed:  [ ] YES     Consultant(s) Notes Reviewed:      Care Discussed with Consultants/Other Providers:    Advanced Directives: [ ] DNR  [ ] No feeding tube  [ ] MOLST in chart  [ ] MOLST completed today  [ ] Unknown

## 2021-09-16 NOTE — PROGRESS NOTE ADULT - SUBJECTIVE AND OBJECTIVE BOX
[facility]  INFECTIOUS DISEASES   [address] [place] [address]  Tel: [phone]     Fax: [phone]  =======================================================    [id number]  JUANIS DE SANTIAGO     Follow up: right hallux cellulitis and bacteremia     No new complaint, mainly worried about his Whipple's surgery.   No pain in foot. Had fever last night.   Has bone biopsy yesterday.     PAST MEDICAL & SURGICAL HISTORY:  HTN (hypertension)  HLD (hyperlipidemia)  GERD (gastroesophageal reflux disease)  Cardiomyopathy  History of total hip replacement left  History of laminectomy  ICD (implantable cardiac defibrillator) in place  Benign testicular tumor  History of hip replacement    Social Hx: no smoking, ETOH or drugs     FAMILY HISTORY:  Family history of stroke (Mother)    Allergies  No Known Allergies    Antibiotics:  cefepime   IVPB 2000 milliGRAM(s) IV Intermittent every 8 hours  vancomycin  IVPB 1250 milliGRAM(s) IV Intermittent every 12 hours     REVIEW OF SYSTEMS:  CONSTITUTIONAL:  No Fever or chills  HEENT:  No diplopia or blurred vision.  No sore throat or runny nose.  CARDIOVASCULAR:  No chest pain or SOB.  RESPIRATORY:  No cough, shortness of breath, PND or orthopnea.  GASTROINTESTINAL:  No nausea, vomiting or diarrhea.  GENITOURINARY:  No dysuria, frequency or urgency. No Blood in urine  MUSCULOSKELETAL: foot ulcer   SKIN:  No change in skin, hair or nails.  NEUROLOGIC:  No paresthesias, fasciculations, seizures or weakness.  PSYCHIATRIC:  No disorder of thought or mood.  ENDOCRINE:  No heat or cold intolerance, polyuria or polydipsia.  HEMATOLOGICAL:  No easy bruising or bleeding.     Physical Exam:  Vital Signs Last 24 Hrs  T(C): 36.5 (16 Sep 2021 13:15), Max: 38.8 (15 Sep 2021 21:10)  T(F): 97.7 (16 Sep 2021 13:15), Max: 101.9 (15 Sep 2021 21:10)  HR: 84 (16 Sep 2021 17:16) (77 - 97)  BP: 111/66 (16 Sep 2021 17:16) (100/64 - 146/78)  BP(mean): --  RR: 18 (16 Sep 2021 13:15) (17 - 18)  SpO2: 98% (16 Sep 2021 13:15) (94% - 98%)  GEN: NAD  HEENT: normocephalic and atraumatic. EOMI. PERRL.    NECK: Supple.  No lymphadenopathy   LUNGS: Clear to auscultation.  HEART: Regular rate and rhythm without murmur.  ABDOMEN: Soft, nontender, and nondistended.  Positive bowel sounds.    : No CVA tenderness  EXTREMITIES: R foot dressed, he didn't want to open, lower leg with swelling and erythema   NEUROLOGIC: grossly intact.  PSYCHIATRIC: Appropriate affect .  SKIN: No ulceration or induration present.    Labs:                        8.6    4.47  )-----------( 68       ( 16 Sep 2021 08:00 )             26.1     09-16    132<L>  |  102  |  14  ----------------------------<  104<H>  3.8   |  23  |  0.74    Ca    7.7<L>      16 Sep 2021 08:00    TPro  6.4  /  Alb  2.7<L>  /  TBili  1.0  /  DBili  x   /  AST  15  /  ALT  16  /  AlkPhos  101  09-15    Culture - Tissue with Gram Stain (collected 09-16-21 @ 01:16)  Source: .Tissue right hallux bone culture  Gram Stain (09-16-21 @ 04:48):    Rare polymorphonuclear leukocytes seen per low power field    No organisms seen per oil power field    Culture - Urine (collected 09-15-21 @ 04:15)  Source: Clean Catch Clean Catch (Midstream)  Final Report (09-16-21 @ 00:06):    <10,000 CFU/mL Normal Urogenital Juani    Culture - Blood (collected 09-15-21 @ 01:20)  Source: .Blood Blood-Peripheral  Gram Stain (09-16-21 @ 01:09):    Growth in aerobic bottle: Gram Positive Cocci in Clusters    Growth in anaerobic bottle: Gram Positive Cocci in Clusters  Organism: Blood Culture PCR (09-15-21 @ 23:28)  Organism: Blood Culture PCR (09-15-21 @ 23:28)    Sensitivities:      -  Staphylococcus aureus: Detec Any isolate of Staphylococcus aureus from a blood culture is NOT considered a contaminant.      Method Type: PCR    Culture - Blood (collected 09-15-21 @ 01:20)  Source: .Blood Blood-Peripheral  Gram Stain (09-15-21 @ 22:34):    Growth in aerobic bottle: Gram Positive Cocci in Clusters    WBC Count: 4.47 K/uL (09-16-21 @ 08:00)  WBC Count: 5.57 K/uL (09-15-21 @ 06:45)  WBC Count: 7.58 K/uL (09-14-21 @ 20:17)    Creatinine, Serum: 0.74 mg/dL (09-16-21 @ 08:00)  Creatinine, Serum: 0.60 mg/dL (09-15-21 @ 06:45)  Creatinine, Serum: 0.66 mg/dL (09-14-21 @ 20:17)    C-Reactive Protein, Serum: 168 mg/L (09-15-21 @ 07:50)    Ferritin, Serum: 585 ng/mL (09-15-21 @ 11:30)    Sedimentation Rate, Erythrocyte: 53 mm/hr (09-14-21 @ 20:17)    COVID-19 PCR: NotDetec (09-14-21 @ 20:17)    All imaging and other data have been reviewed.  < from: Xray Tibia + Fibula 2 Views, Right (09.14.21 @ 20:54) >  IMPRESSION: No acute chest finding. Ankle edema and degenerative changes in the foot again seen. Fairly advanced right knee degeneration also again noted.    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 Patient states he was advised to go the wound care center by his oncologist at [facility] due to increased redness and edema to right lower leg.     ESR 53  Xray with degenerative changes   s/p debridement and bone biopsy on 9/15    R lower leg cellulitis, R hallux wound    Recommendations:   - Blood culture with MSSA  - Wound culture pending   - Podiatry/wound care follow up  - Stop cefepime and vancomycin and start cefazolin 2gm q8h  - TTE  - Follow up bone scan result and Bone biopsy.     Will follow.  Discussed with his son in details.     Jaren Bell MD  Division of Infectious Diseases   Cell [phone] between 8am and 6pm   After 6pm and weekends please call ID service at [phone].

## 2021-09-16 NOTE — PROGRESS NOTE ADULT - ASSESSMENT
Full Thickness Wound Dorsal Right Hallux w/ Rigid digital contracture at the IPJ- soft tissue infection- s/p Right Hallux Bone biopsy and Wound Debridement at bedside with Dr. López on 9/15/2021     Upon clinically presentation, the right hallux appears to have osteomyelitis     Patient unable to get MRI, bone scan was ordered    Positive Blood Cultures, follow up with infectious disease recommendations    Follow up on Bone biopsy results     Area dressed with Aquacel Ag and DSD    Podiatry team will continue to follow patient while in house

## 2021-09-17 LAB
-  AMPICILLIN/SULBACTAM: SIGNIFICANT CHANGE UP
-  CEFAZOLIN: SIGNIFICANT CHANGE UP
-  CLINDAMYCIN: SIGNIFICANT CHANGE UP
-  ERYTHROMYCIN: SIGNIFICANT CHANGE UP
-  GENTAMICIN: SIGNIFICANT CHANGE UP
-  OXACILLIN: SIGNIFICANT CHANGE UP
-  RIFAMPIN: SIGNIFICANT CHANGE UP
-  TETRACYCLINE: SIGNIFICANT CHANGE UP
-  TRIMETHOPRIM/SULFAMETHOXAZOLE: SIGNIFICANT CHANGE UP
-  VANCOMYCIN: SIGNIFICANT CHANGE UP
ANION GAP SERPL CALC-SCNC: 7 MMOL/L — SIGNIFICANT CHANGE UP (ref 5–17)
BUN SERPL-MCNC: 11 MG/DL — SIGNIFICANT CHANGE UP (ref 7–23)
CALCIUM SERPL-MCNC: 7.9 MG/DL — LOW (ref 8.5–10.1)
CHLORIDE SERPL-SCNC: 105 MMOL/L — SIGNIFICANT CHANGE UP (ref 96–108)
CO2 SERPL-SCNC: 25 MMOL/L — SIGNIFICANT CHANGE UP (ref 22–31)
CREAT SERPL-MCNC: 0.6 MG/DL — SIGNIFICANT CHANGE UP (ref 0.5–1.3)
CULTURE RESULTS: SIGNIFICANT CHANGE UP
CULTURE RESULTS: SIGNIFICANT CHANGE UP
GLUCOSE SERPL-MCNC: 110 MG/DL — HIGH (ref 70–99)
HCT VFR BLD CALC: 26.8 % — LOW (ref 39–50)
HGB BLD-MCNC: 8.9 G/DL — LOW (ref 13–17)
MCHC RBC-ENTMCNC: 33.2 GM/DL — SIGNIFICANT CHANGE UP (ref 32–36)
MCHC RBC-ENTMCNC: 34.1 PG — HIGH (ref 27–34)
MCV RBC AUTO: 102.7 FL — HIGH (ref 80–100)
METHOD TYPE: SIGNIFICANT CHANGE UP
NRBC # BLD: 0 /100 WBCS — SIGNIFICANT CHANGE UP (ref 0–0)
ORGANISM # SPEC MICROSCOPIC CNT: SIGNIFICANT CHANGE UP
PLATELET # BLD AUTO: 84 K/UL — LOW (ref 150–400)
POTASSIUM SERPL-MCNC: 4 MMOL/L — SIGNIFICANT CHANGE UP (ref 3.5–5.3)
POTASSIUM SERPL-SCNC: 4 MMOL/L — SIGNIFICANT CHANGE UP (ref 3.5–5.3)
RBC # BLD: 2.61 M/UL — LOW (ref 4.2–5.8)
RBC # FLD: 15.5 % — HIGH (ref 10.3–14.5)
SODIUM SERPL-SCNC: 137 MMOL/L — SIGNIFICANT CHANGE UP (ref 135–145)
SPECIMEN SOURCE: SIGNIFICANT CHANGE UP
SPECIMEN SOURCE: SIGNIFICANT CHANGE UP
WBC # BLD: 4.35 K/UL — SIGNIFICANT CHANGE UP (ref 3.8–10.5)
WBC # FLD AUTO: 4.35 K/UL — SIGNIFICANT CHANGE UP (ref 3.8–10.5)

## 2021-09-17 PROCEDURE — 99232 SBSQ HOSP IP/OBS MODERATE 35: CPT

## 2021-09-17 PROCEDURE — 99233 SBSQ HOSP IP/OBS HIGH 50: CPT | Mod: GC

## 2021-09-17 RX ORDER — PROCHLORPERAZINE MALEATE 5 MG
10 TABLET ORAL DAILY
Refills: 0 | Status: DISCONTINUED | OUTPATIENT
Start: 2021-09-17 | End: 2021-09-20

## 2021-09-17 RX ADMIN — Medication 1 SPRAY(S): at 17:10

## 2021-09-17 RX ADMIN — PANTOPRAZOLE SODIUM 40 MILLIGRAM(S): 20 TABLET, DELAYED RELEASE ORAL at 05:03

## 2021-09-17 RX ADMIN — GABAPENTIN 300 MILLIGRAM(S): 400 CAPSULE ORAL at 17:09

## 2021-09-17 RX ADMIN — DORZOLAMIDE HYDROCHLORIDE TIMOLOL MALEATE 1 DROP(S): 20; 5 SOLUTION/ DROPS OPHTHALMIC at 05:03

## 2021-09-17 RX ADMIN — RIVAROXABAN 20 MILLIGRAM(S): KIT at 17:09

## 2021-09-17 RX ADMIN — Medication 5 MILLIGRAM(S): at 21:10

## 2021-09-17 RX ADMIN — Medication 1 SPRAY(S): at 05:03

## 2021-09-17 RX ADMIN — Medication 100 MILLIGRAM(S): at 12:04

## 2021-09-17 RX ADMIN — Medication 40 MILLIGRAM(S): at 08:05

## 2021-09-17 RX ADMIN — DULOXETINE HYDROCHLORIDE 60 MILLIGRAM(S): 30 CAPSULE, DELAYED RELEASE ORAL at 12:04

## 2021-09-17 RX ADMIN — OXYCODONE AND ACETAMINOPHEN 1 TABLET(S): 5; 325 TABLET ORAL at 23:20

## 2021-09-17 RX ADMIN — Medication 2 CAPSULE(S): at 08:06

## 2021-09-17 RX ADMIN — Medication 1 TABLET(S): at 12:04

## 2021-09-17 RX ADMIN — Medication 100 MILLIGRAM(S): at 13:23

## 2021-09-17 RX ADMIN — Medication 2 CAPSULE(S): at 12:04

## 2021-09-17 RX ADMIN — Medication 100 MILLIGRAM(S): at 05:02

## 2021-09-17 RX ADMIN — OXYCODONE AND ACETAMINOPHEN 1 TABLET(S): 5; 325 TABLET ORAL at 22:04

## 2021-09-17 RX ADMIN — OXYCODONE AND ACETAMINOPHEN 1 TABLET(S): 5; 325 TABLET ORAL at 13:00

## 2021-09-17 RX ADMIN — GABAPENTIN 300 MILLIGRAM(S): 400 CAPSULE ORAL at 05:03

## 2021-09-17 RX ADMIN — LATANOPROST 1 DROP(S): 0.05 SOLUTION/ DROPS OPHTHALMIC; TOPICAL at 21:10

## 2021-09-17 RX ADMIN — DORZOLAMIDE HYDROCHLORIDE TIMOLOL MALEATE 1 DROP(S): 20; 5 SOLUTION/ DROPS OPHTHALMIC at 17:10

## 2021-09-17 RX ADMIN — TAMSULOSIN HYDROCHLORIDE 0.4 MILLIGRAM(S): 0.4 CAPSULE ORAL at 21:11

## 2021-09-17 RX ADMIN — Medication 100 MILLIGRAM(S): at 21:10

## 2021-09-17 RX ADMIN — Medication 400 UNIT(S): at 12:04

## 2021-09-17 RX ADMIN — Medication 1 DROP(S): at 12:03

## 2021-09-17 RX ADMIN — Medication 100 MILLIGRAM(S): at 21:12

## 2021-09-17 RX ADMIN — Medication 20 MILLIGRAM(S): at 05:03

## 2021-09-17 RX ADMIN — Medication 2 CAPSULE(S): at 17:09

## 2021-09-17 RX ADMIN — SACUBITRIL AND VALSARTAN 1 TABLET(S): 24; 26 TABLET, FILM COATED ORAL at 08:05

## 2021-09-17 RX ADMIN — OXYCODONE AND ACETAMINOPHEN 1 TABLET(S): 5; 325 TABLET ORAL at 12:02

## 2021-09-17 RX ADMIN — Medication 40 MILLIGRAM(S): at 17:09

## 2021-09-17 RX ADMIN — Medication 200 MILLIGRAM(S): at 20:09

## 2021-09-17 RX ADMIN — SACUBITRIL AND VALSARTAN 1 TABLET(S): 24; 26 TABLET, FILM COATED ORAL at 17:09

## 2021-09-17 NOTE — PROGRESS NOTE ADULT - ASSESSMENT
Full Thickness Wound Dorsal Right Hallux w/ Rigid digital contracture at the IPJ- soft tissue infection- s/p Right Hallux Bone biopsy and Wound Debridement at bedside with Dr. López on 9/15/2021     Bone Biopsy pathology results came back as Acute Osteomyelitis, Dr. López spoke with the patient and had a discussion of the treatment options    Patient is currently scheduled for Right 1st Partial Ray Resection with Dr. Felipe on Tuesday 9/22/2021     Patient will require medical and cardiac clearance     Podiatry team will continue to follow patient while in house  Full Thickness Wound Dorsal Right Hallux w/ Rigid digital contracture at the IPJ- soft tissue infection- s/p Right Hallux Bone biopsy and Wound Debridement at bedside with Dr. López on 9/15/2021     Bone Biopsy pathology results came back as Acute Osteomyelitis, Dr. López spoke with the patient and had a discussion of the treatment options    Patient is currently scheduled for Right 1st Partial Ray Resection with Dr. López on Monday 9/20/2021 as an add on   Patient will require medical and cardiac clearance     Podiatry team will continue to follow patient while in house

## 2021-09-17 NOTE — PROGRESS NOTE ADULT - SUBJECTIVE AND OBJECTIVE BOX
Patient is a 80y old  Male who presents with a chief complaint of right hallux ulcer/cellulitis (17 Sep 2021 16:21)      Subjective:  INTERVAL HPI/OVERNIGHT EVENTS: Patient seen and examined at bedside. No overnight events occurred. Patient c/o nausea overnight. Denies fevers, chills, headache, lightheadedness, chest pain, dyspnea, abdominal pain, v/d/c.    MEDICATIONS  (STANDING):  benzonatate 100 milliGRAM(s) Oral three times a day  ceFAZolin   IVPB 2000 milliGRAM(s) IV Intermittent every 8 hours  cholecalciferol 400 Unit(s) Oral daily  dorzolamide 2%/timolol 0.5% Ophthalmic Solution 1 Drop(s) Both EYES two times a day  DULoxetine 60 milliGRAM(s) Oral daily  fluticasone propionate 50 MICROgram(s)/spray Nasal Spray 1 Spray(s) Both Nostrils every 12 hours  furosemide    Tablet 20 milliGRAM(s) Oral daily  gabapentin 300 milliGRAM(s) Oral two times a day  ketorolac 0.5% Ophthalmic Solution 1 Drop(s) Both EYES daily  latanoprost 0.005% Ophthalmic Solution 1 Drop(s) Both EYES at bedtime  melatonin 5 milliGRAM(s) Oral at bedtime  multivitamin 1 Tablet(s) Oral daily  pancrelipase  (CREON 36,000 Lipase Units) 2 Capsule(s) Oral three times a day with meals  pantoprazole    Tablet 40 milliGRAM(s) Oral before breakfast  pyridoxine 100 milliGRAM(s) Oral daily  rivaroxaban 20 milliGRAM(s) Oral with dinner  sacubitril 49 mG/valsartan 51 mG 1 Tablet(s) Oral two times a day  sotalol 40 milliGRAM(s) Oral two times a day  tamsulosin 0.4 milliGRAM(s) Oral at bedtime    MEDICATIONS  (PRN):  acetaminophen   Tablet .. 650 milliGRAM(s) Oral every 6 hours PRN Temp greater or equal to 38C (100.4F), Mild Pain (1 - 3)  ALPRAZolam 0.25 milliGRAM(s) Oral at bedtime PRN anxiety  guaiFENesin Oral Liquid (Sugar-Free) 200 milliGRAM(s) Oral every 6 hours PRN Cough  oxycodone    5 mG/acetaminophen 325 mG 1 Tablet(s) Oral every 6 hours PRN Moderate Pain (4 - 6)      Allergies    No Known Allergies    Intolerances        REVIEW OF SYSTEMS:  CONSTITUTIONAL: No fever or chills  HEENT:  No headache, no sore throat  RESPIRATORY: No cough, wheezing, or shortness of breath  CARDIOVASCULAR: No chest pain, palpitations  GASTROINTESTINAL: +nausea. No abd pain, vomiting, or diarrhea  GENITOURINARY: No dysuria, frequency, or hematuria  NEUROLOGICAL: no focal weakness or dizziness  MUSCULOSKELETAL: no myalgias     Objective:  Vital Signs Last 24 Hrs  T(C): 36.7 (17 Sep 2021 13:05), Max: 36.7 (17 Sep 2021 13:05)  T(F): 98.1 (17 Sep 2021 13:05), Max: 98.1 (17 Sep 2021 13:05)  HR: 87 (17 Sep 2021 17:00) (86 - 96)  BP: 114/67 (17 Sep 2021 17:00) (112/67 - 129/74)  BP(mean): --  RR: 18 (17 Sep 2021 17:00) (17 - 18)  SpO2: 96% (17 Sep 2021 17:00) (94% - 99%)    GENERAL: NAD, lying in bed comfortably  HEAD:  Atraumatic, Normocephalic  EYES: EOMI, PERRLA, conjunctiva and sclera clear  ENT: Moist mucous membranes  NECK: Supple, No JVD  CHEST/LUNG: Clear to auscultation bilaterally; No rales, rhonchi, wheezing, or rubs. Unlabored respirations  HEART: Regular rate and rhythm; No murmurs, rubs, or gallops  ABDOMEN: Bowel sounds present; Soft, Nontender, Nondistended. No hepatomegaly  EXTREMITIES:  2+ Peripheral Pulses, brisk capillary refill. No clubbing, cyanosis, or edema  NERVOUS SYSTEM:  Alert & Oriented X3, speech clear. No deficits   MSK: R foot wrapped in ace bandage with gauze over hallux. Dressing C/D/I. Calves soft, NTTP BL   SKIN: No rashes or lesions    LABS:                        8.9    4.35  )-----------( 84       ( 17 Sep 2021 08:20 )             26.8     CBC Full  -  ( 17 Sep 2021 08:20 )  WBC Count : 4.35 K/uL  Hemoglobin : 8.9 g/dL  Hematocrit : 26.8 %  Platelet Count - Automated : 84 K/uL  Mean Cell Volume : 102.7 fl  Mean Cell Hemoglobin : 34.1 pg  Mean Cell Hemoglobin Concentration : 33.2 gm/dL  Auto Neutrophil # : x  Auto Lymphocyte # : x  Auto Monocyte # : x  Auto Eosinophil # : x  Auto Basophil # : x  Auto Neutrophil % : x  Auto Lymphocyte % : x  Auto Monocyte % : x  Auto Eosinophil % : x  Auto Basophil % : x    17 Sep 2021 08:20    137    |  105    |  11     ----------------------------<  110    4.0     |  25     |  0.60     Ca    7.9        17 Sep 2021 08:20          CAPILLARY BLOOD GLUCOSE            Culture - Blood (collected 09-16-21 @ 12:01)  Source: .Blood Blood-Peripheral  Preliminary Report (09-17-21 @ 13:02):    No growth to date.    Culture - Blood (collected 09-16-21 @ 12:01)  Source: .Blood Blood-Peripheral  Preliminary Report (09-17-21 @ 13:02):    No growth to date.    Culture - Tissue with Gram Stain (collected 09-16-21 @ 01:16)  Source: .Tissue right hallux bone culture  Gram Stain (09-16-21 @ 04:48):    Rare polymorphonuclear leukocytes seen per low power field    No organisms seen per oil power field  Preliminary Report (09-17-21 @ 09:03):    Few Staphylococcus aureus    Culture - Urine (collected 09-15-21 @ 04:15)  Source: Clean Catch Clean Catch (Midstream)  Final Report (09-16-21 @ 00:06):    <10,000 CFU/mL Normal Urogenital Juani    Culture - Blood (collected 09-15-21 @ 01:20)  Source: .Blood Blood-Peripheral  Gram Stain (09-16-21 @ 01:09):    Growth in aerobic bottle: Gram Positive Cocci in Clusters    Growth in anaerobic bottle: Gram Positive Cocci in Clusters  Final Report (09-17-21 @ 16:59):    Growth in aerobic and anaerobic bottles: Staphylococcus aureus    ***Blood Panel PCR results on this specimen are available    approximately 3 hours after the Gram stain result.***    Gram stain, PCR, and/or culture results may not always    correspond dueto difference in methodologies.    ************************************************************    This PCR assay was performed by multiplex PCR. This    Assay tests for 66 bacterial and resistance gene targets.    Please refer to the [facility] test directory    at [email or web address] for details.  Organism: Blood Culture PCR  Staphylococcus aureus (09-17-21 @ 16:59)  Organism: Staphylococcus aureus (09-17-21 @ 16:59)      -  Ampicillin/Sulbactam: S <=8/4      -  Cefazolin: S <=4      -  Clindamycin: R <=0.25 This isolate is presumed to be clindamycin resistant based on detection of inducible resistance. Clindamycin may still be effective in some patients.      -  Erythromycin: I 1      -  Gentamicin: S <=1 Should not be used as monotherapy      -  Oxacillin: S 0.5      -  RIF- Rifampin: S <=1 Should not be used as monotherapy      -  Tetra/Doxy: S <=1      -  Trimethoprim/Sulfamethoxazole: S <=0.5/9.5      -  Vancomycin: S 2      Method Type: TYSON  Organism: Blood Culture PCR (09-17-21 @ 16:59)      -  Staphylococcus aureus: Detec Any isolate of Staphylococcus aureus from a blood culture is NOT considered a contaminant.      Method Type: PCR    Culture - Blood (collected 09-15-21 @ 01:20)  Source: .Blood Blood-Peripheral  Gram Stain (09-15-21 @ 22:34):    Growth in aerobic bottle: Gram Positive Cocci in Clusters  Preliminary Report (09-16-21 @ 18:56):    Growth in aerobic bottle: Staphylococcus aureus    See previous culture [id number]        RADIOLOGY & ADDITIONAL TESTS:    Personally reviewed.     Consultant(s) Notes Reviewed:  [x] YES  [ ] NO     Patient is a 80y old  Male who presents with a chief complaint of right hallux ulcer/cellulitis.      Subjective:  INTERVAL HPI/OVERNIGHT EVENTS: Patient seen and examined at bedside. Patient c/o nausea overnight, which has now resolved. Admits right foot swelling. Minimal right foot pain. Denies fevers, chills, headache, lightheadedness, chest pain, dyspnea, abdominal pain, v/d/c.    MEDICATIONS  (STANDING):  benzonatate 100 milliGRAM(s) Oral three times a day  ceFAZolin   IVPB 2000 milliGRAM(s) IV Intermittent every 8 hours  cholecalciferol 400 Unit(s) Oral daily  dorzolamide 2%/timolol 0.5% Ophthalmic Solution 1 Drop(s) Both EYES two times a day  DULoxetine 60 milliGRAM(s) Oral daily  fluticasone propionate 50 MICROgram(s)/spray Nasal Spray 1 Spray(s) Both Nostrils every 12 hours  furosemide    Tablet 20 milliGRAM(s) Oral daily  gabapentin 300 milliGRAM(s) Oral two times a day  ketorolac 0.5% Ophthalmic Solution 1 Drop(s) Both EYES daily  latanoprost 0.005% Ophthalmic Solution 1 Drop(s) Both EYES at bedtime  melatonin 5 milliGRAM(s) Oral at bedtime  multivitamin 1 Tablet(s) Oral daily  pancrelipase  (CREON 36,000 Lipase Units) 2 Capsule(s) Oral three times a day with meals  pantoprazole    Tablet 40 milliGRAM(s) Oral before breakfast  pyridoxine 100 milliGRAM(s) Oral daily  rivaroxaban 20 milliGRAM(s) Oral with dinner  sacubitril 49 mG/valsartan 51 mG 1 Tablet(s) Oral two times a day  sotalol 40 milliGRAM(s) Oral two times a day  tamsulosin 0.4 milliGRAM(s) Oral at bedtime    MEDICATIONS  (PRN):  acetaminophen   Tablet .. 650 milliGRAM(s) Oral every 6 hours PRN Temp greater or equal to 38C (100.4F), Mild Pain (1 - 3)  ALPRAZolam 0.25 milliGRAM(s) Oral at bedtime PRN anxiety  guaiFENesin Oral Liquid (Sugar-Free) 200 milliGRAM(s) Oral every 6 hours PRN Cough  oxycodone    5 mG/acetaminophen 325 mG 1 Tablet(s) Oral every 6 hours PRN Moderate Pain (4 - 6)      Allergies    No Known Allergies    Intolerances        REVIEW OF SYSTEMS:  CONSTITUTIONAL: No fever or chills  HEENT:  No headache, no sore throat  RESPIRATORY: No cough, wheezing, or shortness of breath  CARDIOVASCULAR: No chest pain, palpitations  GASTROINTESTINAL: +nausea overnight (now resolved). No abd pain, vomiting, or diarrhea  GENITOURINARY: No dysuria, frequency, or hematuria  NEUROLOGICAL: no focal weakness or dizziness  MUSCULOSKELETAL: +right foot swelling, minimal right foot pain    Objective:  Vital Signs Last 24 Hrs  T(C): 36.7 (17 Sep 2021 13:05), Max: 36.7 (17 Sep 2021 13:05)  T(F): 98.1 (17 Sep 2021 13:05), Max: 98.1 (17 Sep 2021 13:05)  HR: 87 (17 Sep 2021 17:00) (86 - 96)  BP: 114/67 (17 Sep 2021 17:00) (112/67 - 129/74)  BP(mean): --  RR: 18 (17 Sep 2021 17:00) (17 - 18)  SpO2: 96% (17 Sep 2021 17:00) (94% - 99%)    Physical Exam:  GENERAL: NAD, lying in bed comfortably  HEENT: Moist mucous membranes, anicteric  CHEST/LUNG: Clear to auscultation bilaterally; No rales, rhonchi, wheezing, or rubs. Unlabored respirations  HEART: Regular rate and rhythm; S1, S2  ABDOMEN: Bowel sounds present; Soft, Nontender, Nondistended.   EXTREMITIES:  2+ Peripheral Pulses, brisk capillary refill. No clubbing, cyanosis, or edema  NERVOUS SYSTEM:  Alert & Oriented X3, speech clear. No deficits   MSK: R foot wrapped in ace bandage with gauze over hallux. Dressing C/D/I. Calves soft, nontender     LABS:                        8.9    4.35  )-----------( 84       ( 17 Sep 2021 08:20 )             26.8     CBC Full  -  ( 17 Sep 2021 08:20 )  WBC Count : 4.35 K/uL  Hemoglobin : 8.9 g/dL  Hematocrit : 26.8 %  Platelet Count - Automated : 84 K/uL  Mean Cell Volume : 102.7 fl  Mean Cell Hemoglobin : 34.1 pg  Mean Cell Hemoglobin Concentration : 33.2 gm/dL  Auto Neutrophil # : x  Auto Lymphocyte # : x  Auto Monocyte # : x  Auto Eosinophil # : x  Auto Basophil # : x  Auto Neutrophil % : x  Auto Lymphocyte % : x  Auto Monocyte % : x  Auto Eosinophil % : x  Auto Basophil % : x    17 Sep 2021 08:20    137    |  105    |  11     ----------------------------<  110    4.0     |  25     |  0.60     Ca    7.9        17 Sep 2021 08:20          CAPILLARY BLOOD GLUCOSE            Culture - Blood (collected 09-16-21 @ 12:01)  Source: .Blood Blood-Peripheral  Preliminary Report (09-17-21 @ 13:02):    No growth to date.    Culture - Blood (collected 09-16-21 @ 12:01)  Source: .Blood Blood-Peripheral  Preliminary Report (09-17-21 @ 13:02):    No growth to date.    Culture - Tissue with Gram Stain (collected 09-16-21 @ 01:16)  Source: .Tissue right hallux bone culture  Gram Stain (09-16-21 @ 04:48):    Rare polymorphonuclear leukocytes seen per low power field    No organisms seen per oil power field  Preliminary Report (09-17-21 @ 09:03):    Few Staphylococcus aureus    Culture - Urine (collected 09-15-21 @ 04:15)  Source: Clean Catch Clean Catch (Midstream)  Final Report (09-16-21 @ 00:06):    <10,000 CFU/mL Normal Urogenital Juani    Culture - Blood (collected 09-15-21 @ 01:20)  Source: .Blood Blood-Peripheral  Gram Stain (09-16-21 @ 01:09):    Growth in aerobic bottle: Gram Positive Cocci in Clusters    Growth in anaerobic bottle: Gram Positive Cocci in Clusters  Final Report (09-17-21 @ 16:59):    Growth in aerobic and anaerobic bottles: Staphylococcus aureus    ***Blood Panel PCR results on this specimen are available    approximately 3 hours after the Gram stain result.***    Gram stain, PCR, and/or culture results may not always    correspond dueto difference in methodologies.    ************************************************************    This PCR assay was performed by multiplex PCR. This    Assay tests for 66 bacterial and resistance gene targets.    Please refer to the [facility] Labs test directory    at [email or web address] for details.  Organism: Blood Culture PCR  Staphylococcus aureus (09-17-21 @ 16:59)  Organism: Staphylococcus aureus (09-17-21 @ 16:59)      -  Ampicillin/Sulbactam: S <=8/4      -  Cefazolin: S <=4      -  Clindamycin: R <=0.25 This isolate is presumed to be clindamycin resistant based on detection of inducible resistance. Clindamycin may still be effective in some patients.      -  Erythromycin: I 1      -  Gentamicin: S <=1 Should not be used as monotherapy      -  Oxacillin: S 0.5      -  RIF- Rifampin: S <=1 Should not be used as monotherapy      -  Tetra/Doxy: S <=1      -  Trimethoprim/Sulfamethoxazole: S <=0.5/9.5      -  Vancomycin: S 2      Method Type: TYSON  Organism: Blood Culture PCR (09-17-21 @ 16:59)      -  Staphylococcus aureus: Detec Any isolate of Staphylococcus aureus from a blood culture is NOT considered a contaminant.      Method Type: PCR    Culture - Blood (collected 09-15-21 @ 01:20)  Source: .Blood Blood-Peripheral  Gram Stain (09-15-21 @ 22:34):    Growth in aerobic bottle: Gram Positive Cocci in Clusters  Preliminary Report (09-16-21 @ 18:56):    Growth in aerobic bottle: Staphylococcus aureus    See previous culture [id number]        RADIOLOGY & ADDITIONAL TESTS:    Personally reviewed.     Consultant(s) Notes Reviewed:  [x] YES  [ ] NO

## 2021-09-17 NOTE — PROGRESS NOTE ADULT - SUBJECTIVE AND OBJECTIVE BOX
[facility]  INFECTIOUS DISEASES   [address] [place] [address]  Tel: [phone]     Fax: [phone]  =======================================================    [id number]  JUANIS DE SANTIAGO     Follow up: right hallux cellulitis and bacteremia     No new complaint, swelling in first toe in right foot.   No more fever.   Had bone biopsy on 9/15, will follow results.     PAST MEDICAL & SURGICAL HISTORY:  HTN (hypertension)  HLD (hyperlipidemia)  GERD (gastroesophageal reflux disease)  Cardiomyopathy  History of total hip replacement left  History of laminectomy  ICD (implantable cardiac defibrillator) in place  Benign testicular tumor  History of hip replacement    Social Hx: no smoking, ETOH or drugs     FAMILY HISTORY:  Family history of stroke (Mother)    Allergies  No Known Allergies    Antibiotics:  cefepime   IVPB 2000 milliGRAM(s) IV Intermittent every 8 hours  vancomycin  IVPB 1250 milliGRAM(s) IV Intermittent every 12 hours     REVIEW OF SYSTEMS:  CONSTITUTIONAL:  No Fever or chills  HEENT:  No diplopia or blurred vision.  No sore throat or runny nose.  CARDIOVASCULAR:  No chest pain or SOB.  RESPIRATORY:  No cough, shortness of breath, PND or orthopnea.  GASTROINTESTINAL:  No nausea, vomiting or diarrhea.  GENITOURINARY:  No dysuria, frequency or urgency. No Blood in urine  MUSCULOSKELETAL: foot ulcer   SKIN:  No change in skin, hair or nails.  NEUROLOGIC:  No paresthesias, fasciculations, seizures or weakness.  PSYCHIATRIC:  No disorder of thought or mood.  ENDOCRINE:  No heat or cold intolerance, polyuria or polydipsia.  HEMATOLOGICAL:  No easy bruising or bleeding.     Physical Exam:  Vital Signs Last 24 Hrs  T(C): 36.7 (17 Sep 2021 13:05), Max: 36.7 (17 Sep 2021 13:05)  T(F): 98.1 (17 Sep 2021 13:05), Max: 98.1 (17 Sep 2021 13:05)  HR: 91 (17 Sep 2021 13:05) (84 - 96)  BP: 119/72 (17 Sep 2021 13:05) (111/66 - 129/74)  BP(mean): --  RR: 17 (17 Sep 2021 13:05) (17 - 18)  SpO2: 99% (17 Sep 2021 13:05) (94% - 99%)  GEN: NAD  HEENT: normocephalic and atraumatic. EOMI. PERRL.    NECK: Supple.  No lymphadenopathy   LUNGS: Clear to auscultation.  HEART: Regular rate and rhythm without murmur.  ABDOMEN: Soft, nontender, and nondistended.  Positive bowel sounds.    : No CVA tenderness  EXTREMITIES: R hallux with severe swelling and erythema. no fluctuation macerated skin, lower leg swelling and erythema improved significantly   NEUROLOGIC: grossly intact.  PSYCHIATRIC: Appropriate affect .  SKIN: No ulceration or induration present.      Labs:                        8.9    4.35  )-----------( 84       ( 17 Sep 2021 08:20 )             26.8     09-17    137  |  105  |  11  ----------------------------<  110<H>  4.0   |  25  |  0.60    Ca    7.9<L>      17 Sep 2021 08:20    Culture - Blood (collected 09-16-21 @ 12:01)  Source: .Blood Blood-Peripheral    Culture - Blood (collected 09-16-21 @ 12:01)  Source: .Blood Blood-Peripheral    Culture - Tissue with Gram Stain (collected 09-16-21 @ 01:16)  Source: .Tissue right hallux bone culture  Gram Stain (09-16-21 @ 04:48):    Rare polymorphonuclear leukocytes seen per low power field    No organisms seen per oil power field    Culture - Urine (collected 09-15-21 @ 04:15)  Source: Clean Catch Clean Catch (Midstream)  Final Report (09-16-21 @ 00:06):    <10,000 CFU/mL Normal Urogenital Juani    Culture - Blood (collected 09-15-21 @ 01:20)  Source: .Blood Blood-Peripheral  Gram Stain (09-16-21 @ 01:09):    Growth in aerobic bottle: Gram Positive Cocci in Clusters    Growth in anaerobic bottle: Gram Positive Cocci in Clusters  Organism: Blood Culture PCR (09-15-21 @ 23:28)  Organism: Blood Culture PCR (09-15-21 @ 23:28)    Sensitivities:      -  Staphylococcus aureus: Detec Any isolate of Staphylococcus aureus from a blood culture is NOT considered a contaminant.      Method Type: PCR    Culture - Blood (collected 09-15-21 @ 01:20)  Source: .Blood Blood-Peripheral  Gram Stain (09-15-21 @ 22:34):    Growth in aerobic bottle: Gram Positive Cocci in Clusters    WBC Count: 4.35 K/uL (09-17-21 @ 08:20)  WBC Count: 4.47 K/uL (09-16-21 @ 08:00)  WBC Count: 5.57 K/uL (09-15-21 @ 06:45)  WBC Count: 7.58 K/uL (09-14-21 @ 20:17)    Creatinine, Serum: 0.60 mg/dL (09-17-21 @ 08:20)  Creatinine, Serum: 0.74 mg/dL (09-16-21 @ 08:00)  Creatinine, Serum: 0.60 mg/dL (09-15-21 @ 06:45)  Creatinine, Serum: 0.66 mg/dL (09-14-21 @ 20:17)    C-Reactive Protein, Serum: 168 mg/L (09-15-21 @ 07:50)    Ferritin, Serum: 585 ng/mL (09-15-21 @ 11:30)    Sedimentation Rate, Erythrocyte: 53 mm/hr (09-14-21 @ 20:17)    COVID-19 PCR: NotDetec (09-14-21 @ 20:17)    All imaging and other data have been reviewed.  < from: Xray Tibia + Fibula 2 Views, Right (09.14.21 @ 20:54) >  IMPRESSION: No acute chest finding. Ankle edema and degenerative changes in the foot again seen. Fairly advanced right knee degeneration also again noted.    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 Patient states he was advised to go the wound care center by his oncologist at [facility] due to increased redness and edema to right lower leg.     ESR 53  Xray with degenerative changes   s/p debridement and bone biopsy on 9/15    R lower leg cellulitis, R hallux wound    Recommendations:   - Blood culture with MSSA on 9/15  - Blood culture NGTD on 9/16  - Wound culture with few staph  - Podiatry/wound care follow up  - Bone scan on 8/18 was negative for bone involvement   - Follow biopsy results   - Continue cefazolin 2gm q8h  - TTE    Will follow.    Jaren Bell MD  Division of Infectious Diseases   Cell [phone] between 8am and 6pm   After 6pm and weekends please call ID service at [phone].

## 2021-09-17 NOTE — PROGRESS NOTE ADULT - PROBLEM SELECTOR PLAN 1
- patient seen and evaluated  - Wound dressing taken off with care and to patient's tolerance  - wound irrigated with normal saline and area pat dry with sterile gauze  -Wound dressed with Aquacel ag and DSD  - patient bone biopsy pathology results came back Acute Osteomyelitis  - Dr. López spoke with the patient about the diagnosis and the treatment options  - Patient is currently scheduled for Right 1st Partial Ray Resection secondary to OM with Dr. Felipe on Tuesday 9/22/2021   - Please provide medical and cardiac clearance for the patient   - Given the patient's severity of symptoms, will plan for surgical intervention at this time. Discussed risks, benefits, and potential complications with patient and family members regarding surgical intervention including sepsis, loss of limb, and loss of life. All questions answered to satisfaction at this time.  Requesting cardiology risk stratification and optimization.  Requesting medical risk stratification and optimization.  - podiatry team will continue to follow patient while in house - patient seen and evaluated  - Wound dressing taken off with care and to patient's tolerance  - wound irrigated with normal saline and area pat dry with sterile gauze  -Wound dressed with Aquacel ag and DSD  - patient bone biopsy pathology results came back Acute Osteomyelitis  - Dr. López spoke with the patient about the diagnosis and the treatment options  -Patient is currently scheduled for Right 1st Partial Ray Resection with Dr. López on Monday 9/20/2021 as an add on   - Please provide medical and cardiac clearance for the patient   - Given the patient's severity of symptoms, will plan for surgical intervention at this time. Discussed risks, benefits, and potential complications with patient and family members regarding surgical intervention including sepsis, loss of limb, and loss of life. All questions answered to satisfaction at this time.  Requesting cardiology risk stratification and optimization.  Requesting medical risk stratification and optimization.  - podiatry team will continue to follow patient while in house

## 2021-09-17 NOTE — PROGRESS NOTE ADULT - TIME BILLING
Pt seen + examined. Case discussed with resident. Agree with assessment and plan above (edited by me in detail):  Time spent: 45min. More than 50% of the visit was spent counseling the patient on medical condition -- acute OM of right hallux, MSSA bacteremia, podiatry plan for amputation.    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    - Cellulitis of right lower leg and Acute OM of right hallux  - MSSA bacteremia on admission BCx; repeat BCx from 9/16 are NGTD  - Surgical pathology of right hallux: +acute Osteomyelitis   - Podiatry/wound care follow up  - ID recs appreciated - c/w cefazolin 2gm q8h  - Wound care as per Podiatry - plan for OR on Monday for R. hallux amputation   - Pain control  - Elevated leg to alleviate pain&swelling  - check TTE  - will get cardio consult for clearance for OR on Monday

## 2021-09-17 NOTE — PROGRESS NOTE ADULT - SUBJECTIVE AND OBJECTIVE BOX
80y year old Male seen at [facility] bedside for Right Hallux infection, possible Osteomyelitis- s/p Right Hallux Bone Biopsy and Wound Debridement by Dr. López on 9/15/2021, . The patient states that he had this wound for more than several months. 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 The patient states he has been following at the [place] wound care center/ hyperbaric center and he has been receiving local wound care. The patient states that his daughter has been doing the dressing changes at home. The patient was sent from the wound care center to the ED for further evaluation and to receive IV antibiotics. The patient also states that he has a whipple procedure that is scheduled to be performed in two weeks. Denies any fever, chills, nausea, vomiting, chest pain, shortness of breath, or calf pain at this time.    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    Benign testicular tumor    History of hip replacement        Allergies    No Known Allergies    Intolerances        MEDICATIONS  (STANDING):  cefepime   IVPB 2000 milliGRAM(s) IV Intermittent every 8 hours  cholecalciferol 400 Unit(s) Oral daily  dorzolamide 2%/timolol 0.5% Ophthalmic Solution 1 Drop(s) Both EYES two times a day  DULoxetine 60 milliGRAM(s) Oral daily  furosemide    Tablet 20 milliGRAM(s) Oral daily  gabapentin 300 milliGRAM(s) Oral two times a day  ketorolac 0.5% Ophthalmic Solution 1 Drop(s) Both EYES daily  latanoprost 0.005% Ophthalmic Solution 1 Drop(s) Both EYES at bedtime  melatonin 5 milliGRAM(s) Oral at bedtime  multivitamin 1 Tablet(s) Oral daily  pancrelipase  (CREON 36,000 Lipase Units) 2 Capsule(s) Oral three times a day with meals  pantoprazole    Tablet 40 milliGRAM(s) Oral before breakfast  pyridoxine 100 milliGRAM(s) Oral daily  rivaroxaban 20 milliGRAM(s) Oral with dinner  sacubitril 49 mG/valsartan 51 mG 1 Tablet(s) Oral two times a day  sotalol 40 milliGRAM(s) Oral two times a day  tamsulosin 0.4 milliGRAM(s) Oral at bedtime  vancomycin  IVPB 1250 milliGRAM(s) IV Intermittent every 12 hours    MEDICATIONS  (PRN):  acetaminophen   Tablet .. 650 milliGRAM(s) Oral every 6 hours PRN Temp greater or equal to 38C (100.4F), Mild Pain (1 - 3)      Social History:  Tobacco: denies  EtOH: occasional  Recreational drug use: denies  Lives: alone  Ambulates: cane  ADLs: independent  Vaccinations: Pfizer x2 2/2021 (14 Sep 2021 23:11)      FAMILY HISTORY:  Family history of stroke (Mother)      Vital Signs Last 24 Hrs  T(C): 36.7 (17 Sep 2021 13:05), Max: 36.7 (17 Sep 2021 13:05)  T(F): 98.1 (17 Sep 2021 13:05), Max: 98.1 (17 Sep 2021 13:05)  HR: 91 (17 Sep 2021 13:05) (84 - 96)  BP: 119/72 (17 Sep 2021 13:05) (111/66 - 129/74)  BP(mean): --  RR: 17 (17 Sep 2021 13:05) (17 - 18)  SpO2: 99% (17 Sep 2021 13:05) (94% - 99%)    PHYSICAL EXAM:  Vascular: DP & PT faintly palpable bilaterally, Capillary refill 3 seconds, No Digital hair present bilaterally, edema and erythema along the right hallux, skin temperature slightly warmer compared to the contralateral   Neurological: Loss of protective sensation b/l  Musculoskeletal: 5/5 strength in all quadrants bilaterally, AJ & STJ ROM intact, no pain upon palpation of the right hallux, rigid non reducible digital contracture at the IPJ of the right hallux.  Dermatological:   Pre- Debridement: Bullae along the distal lateral aspect of the right hallux, more than 1 cmx 1cm in size with serous drainage and full thickness wound along the anterior medial aspect of the right hallux - size 0.2cmx0.2cm- macerated borders, red granular base, moderate serous sanguineous drainage, does not probe to bone, no tunneling no undermining , no malodoro  Post Debridement & Bedside Bone Biopsy- Full thickness wound along the dorsal aspect of the right hallux, size 1.5cmx0.2cmx0.2cm- probes to bone, partial thickness wound along the distal lateral aspect after drainage of more than 2cc of serous fluid, probes to bone, tunneling and undermining, no malodor and sanguineous drainage     CBC Full  -  ( 17 Sep 2021 08:20 )  WBC Count : 4.35 K/uL  RBC Count : 2.61 M/uL  Hemoglobin : 8.9 g/dL  Hematocrit : 26.8 %  Platelet Count - Automated : 84 K/uL  Mean Cell Volume : 102.7 fl  Mean Cell Hemoglobin : 34.1 pg  Mean Cell Hemoglobin Concentration : 33.2 gm/dL  Auto Neutrophil # : x  Auto Lymphocyte # : x  Auto Monocyte # : x  Auto Eosinophil # : x  Auto Basophil # : x  Auto Neutrophil % : x  Auto Lymphocyte % : x  Auto Monocyte % : x  Auto Eosinophil % : x  Auto Basophil % : x      ----------CHEM PANEL----------    09-17    137  |  105  |  11  ----------------------------<  110<H>  4.0   |  25  |  0.60    Ca    7.9<L>      17 Sep 2021 08:20              Imaging:   EXAM: XR FOOT COMP MIN 3 VIEWS RT    EXAM: XR ANKLE COMP MIN 3 VIEWS RT    EXAM: XR CHEST AP OR PA 1V    EXAM: XR TIB FIB AP LAT 2 VIEWS RT      PROCEDURE DATE: 09/14/2021        INTERPRETATION: Chest and right tib-fib, ankle, and foot. Patient has cellulitis of the right leg and wound care the foot. There is history of pancreatic cancer.    AP chest on September 14, 2021 at 8:51 PM.    Heart magnified by technique. Extensive is thoracolumbar hardware left-sided Miqksh-[id number]-Bcvj remain.    Mild right thoracic curve again noted.    Lungs remain clear.    Present study shows a right MediPort new since June 12, 2020.      Right tib-fib. AP and lateral views. Arterial calcifications. No knee effusion.    Moderate to advanced knee degeneration. No bone destruction or fracture.    Right ankle. 3 views. Arterial calcification and ankle edema. No bone destruction or fracture.    Right foot. 3 views.    Partial subluxation of the right first MTP joint with associated degeneration again noted.    Scattered slight IP degeneration again seen. No fracture or radiographic bone destruction.    Right foot is similar to August 4.    IMPRESSION: No acute chest finding. Ankle edema and degenerative changes in the foot again seen. Fairly advanced right knee degeneration also again noted.    --- End of Report ---            JODI GURROLA MD; Attending Radiologist  This document has been electronically signed. Sep 15 2021 11:45AM     80y year old Male seen at [facility] bedside for Right Hallux infection, possible Osteomyelitis- s/p Right Hallux Bone Biopsy and Wound Debridement by Dr. López on 9/15/2021, . The patient states that he had this wound for more than several months. 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 The patient states he has been following at the [place] wound care center/ hyperbaric center and he has been receiving local wound care. The patient states that his daughter has been doing the dressing changes at home. The patient was sent from the wound care center to the ED for further evaluation and to receive IV antibiotics. The patient also states that he has a whipple procedure that is scheduled to be performed in two weeks. Denies any fever, chills, nausea, vomiting, chest pain, shortness of breath, or calf pain at this time.    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    Benign testicular tumor    History of hip replacement        Allergies    No Known Allergies    Intolerances        MEDICATIONS  (STANDING):  cefepime   IVPB 2000 milliGRAM(s) IV Intermittent every 8 hours  cholecalciferol 400 Unit(s) Oral daily  dorzolamide 2%/timolol 0.5% Ophthalmic Solution 1 Drop(s) Both EYES two times a day  DULoxetine 60 milliGRAM(s) Oral daily  furosemide    Tablet 20 milliGRAM(s) Oral daily  gabapentin 300 milliGRAM(s) Oral two times a day  ketorolac 0.5% Ophthalmic Solution 1 Drop(s) Both EYES daily  latanoprost 0.005% Ophthalmic Solution 1 Drop(s) Both EYES at bedtime  melatonin 5 milliGRAM(s) Oral at bedtime  multivitamin 1 Tablet(s) Oral daily  pancrelipase  (CREON 36,000 Lipase Units) 2 Capsule(s) Oral three times a day with meals  pantoprazole    Tablet 40 milliGRAM(s) Oral before breakfast  pyridoxine 100 milliGRAM(s) Oral daily  rivaroxaban 20 milliGRAM(s) Oral with dinner  sacubitril 49 mG/valsartan 51 mG 1 Tablet(s) Oral two times a day  sotalol 40 milliGRAM(s) Oral two times a day  tamsulosin 0.4 milliGRAM(s) Oral at bedtime  vancomycin  IVPB 1250 milliGRAM(s) IV Intermittent every 12 hours    MEDICATIONS  (PRN):  acetaminophen   Tablet .. 650 milliGRAM(s) Oral every 6 hours PRN Temp greater or equal to 38C (100.4F), Mild Pain (1 - 3)      Social History:  Tobacco: denies  EtOH: occasional  Recreational drug use: denies  Lives: alone  Ambulates: cane  ADLs: independent  Vaccinations: Pfizer x2 2/2021 (14 Sep 2021 23:11)      FAMILY HISTORY:  Family history of stroke (Mother)      Vital Signs Last 24 Hrs  T(C): 36.7 (17 Sep 2021 13:05), Max: 36.7 (17 Sep 2021 13:05)  T(F): 98.1 (17 Sep 2021 13:05), Max: 98.1 (17 Sep 2021 13:05)  HR: 91 (17 Sep 2021 13:05) (84 - 96)  BP: 119/72 (17 Sep 2021 13:05) (111/66 - 129/74)  BP(mean): --  RR: 17 (17 Sep 2021 13:05) (17 - 18)  SpO2: 99% (17 Sep 2021 13:05) (94% - 99%)    PHYSICAL EXAM:  Vascular: DP & PT faintly palpable bilaterally, Capillary refill 3 seconds, No Digital hair present bilaterally, edema and erythema along the right hallux, skin temperature slightly warmer compared to the contralateral   Neurological: Loss of protective sensation b/l  Musculoskeletal: 5/5 strength in all quadrants bilaterally, AJ & STJ ROM intact, no pain upon palpation of the right hallux, rigid non reducible digital contracture at the IPJ of the right hallux.  Dermatological:   Pre- Debridement: Bullae along the distal lateral aspect of the right hallux, more than 1 cmx 1cm in size with serous drainage and full thickness wound along the anterior medial aspect of the right hallux - size 0.2cmx0.2cm- macerated borders, red granular base, moderate serous sanguineous drainage, does not probe to bone, no tunneling no undermining , no malodoro  Post Debridement & Bedside Bone Biopsy- Full thickness wound along the dorsal aspect of the right hallux, size 1.5cmx0.2cmx0.2cm- probes to bone, partial thickness wound along the distal lateral aspect after drainage of more than 2cc of serous fluid, probes to bone, tunneling and undermining, no malodor and sanguineous drainage     CBC Full  -  ( 17 Sep 2021 08:20 )  WBC Count : 4.35 K/uL  RBC Count : 2.61 M/uL  Hemoglobin : 8.9 g/dL  Hematocrit : 26.8 %  Platelet Count - Automated : 84 K/uL  Mean Cell Volume : 102.7 fl  Mean Cell Hemoglobin : 34.1 pg  Mean Cell Hemoglobin Concentration : 33.2 gm/dL  Auto Neutrophil # : x  Auto Lymphocyte # : x  Auto Monocyte # : x  Auto Eosinophil # : x  Auto Basophil # : x  Auto Neutrophil % : x  Auto Lymphocyte % : x  Auto Monocyte % : x  Auto Eosinophil % : x  Auto Basophil % : x      ----------CHEM PANEL----------    09-17    137  |  105  |  11  ----------------------------<  110<H>  4.0   |  25  |  0.60    Ca    7.9<L>      17 Sep 2021 08:20              Imaging:   EXAM: XR FOOT COMP MIN 3 VIEWS RT    EXAM: XR ANKLE COMP MIN 3 VIEWS RT    EXAM: XR CHEST AP OR PA 1V    EXAM: XR TIB FIB AP LAT 2 VIEWS RT      PROCEDURE DATE: 09/14/2021        INTERPRETATION: Chest and right tib-fib, ankle, and foot. Patient has cellulitis of the right leg and wound care the foot. There is history of pancreatic cancer.    AP chest on September 14, 2021 at 8:51 PM.    Heart magnified by technique. Extensive is thoracolumbar hardware left-sided Hgrpis-a-Itag remain.    Mild right thoracic curve again noted.    Lungs remain clear.    Present study shows a right MediPort new since June 12, 2020.      Right tib-fib. AP and lateral views. Arterial calcifications. No knee effusion.    Moderate to advanced knee degeneration. No bone destruction or fracture.    Right ankle. 3 views. Arterial calcification and ankle edema. No bone destruction or fracture.    Right foot. 3 views.    Partial subluxation of the right first MTP joint with associated degeneration again noted.    Scattered slight IP degeneration again seen. No fracture or radiographic bone destruction.    Right foot is similar to August 4.    IMPRESSION: No acute chest finding. Ankle edema and degenerative changes in the foot again seen. Fairly advanced right knee degeneration also again noted.    --- End of Report ---            JODI GURROLA MD; Attending Radiologist  This document has been electronically signed. Sep 15 2021 11:45AM      ACCESSION No:  [id number]  Patient:   JUANIS DE SANTIAGO      Accession:                             [id number]    Collected Date/Time:                   9/15/2021 15:48 EDT  Received Date/Time:                    9/16/2021 07:25 EDT    Surgical Pathology Report - Auth (Verified)    Specimen(s) Submitted  1  Right hallux    Final Diagnosis  Bone, hallux, right, biopsy:  - Acute osteomyelitis.    Verified by: Loraine Sinclair M.D.  (Electronic Signature)  Reported on: 09/17/21 11:10 EDT, [facility], [address] [place] [address]  Phone: [phone]   Fax: [phone]  _________________________________________________________________    Clinical Information  Rule out osteomyelitis    Procedure: Right big toe bone biopsy    Gross Description  Received:  In formalin labeled  "right toe" , consisting of multiple  pieces  of tan-yellow bony tissue  Dimensions:  1.5 x 1.4 x 0.2 cm aggregate  Submitted:  Entirely following decalcification in one cassette: 1A  RAQUEL Zhang (ASCP) 09/16/2021 10:52 AM    Disclaimer  In addition to other data that may appear on the specimen containers, all  labels have been inspected to confirm the presence of the patient's name  and date of birth.  Histological processing performed at [facility], Department of  Pathology, [address] [place] [address].

## 2021-09-17 NOTE — PROGRESS NOTE ADULT - ASSESSMENT
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     Problem/Plan - 1:  ·  Problem: Cellulitis of right lower leg.   ·  Plan: Patient presents with LLE erythema, edema, and pain likely 2/2 cellulitis  - Blood culture with MSSA  - Surgical pathology: +acute Osteomyelitis   - Podiatry/wound care follow up  - ID recs appreciated -started on cefazolin 2gm q8h  - Follow up bone scan result   - Wound care as per Podiatry - plan for OR saturday for R. hallux amputation   - Pain control  - Elevated leg to alleviate pain&swelling     Problem/Plan - 2:  ·  Problem: Injury of right toe.   ·  Plan: Pt follows Dr. Felipe/Rene for R hallux hammertoe dorsal stage 3 pressure injury  - Fall precautions  - Podiatry (Dr. López) consulted, f/u recs.     Problem/Plan - 3:  ·  Problem: HTN (hypertension).   ·  Plan: Chronic, soft BP noted and BP meds were held this morning. Will resume step-wise  - C/w Lasix 20 mg PO qd, Entresto 1 tab BID, Sotalol 40 mg PO BID with hold parameters  - BP stable 146/78, continue to monitor off Bystolic ( at home on 5 mg PO qd )  - Monitor routine hemodynamics.     Problem/Plan - 4:  ·  Problem: Pulmonary embolism.   ·  Plan: Bilateral PE in 7/2020  - Continue Xarelto 20 mg PO qd  - Patient is scheduled for Whipple's procedure on 9/23/2021 and was advised to stop Xarelto on 9/20 and start Heparin.     Problem/Plan - 5:  ·  Problem: CHF (congestive heart failure).   ·  Plan: Chronic systolic CHF s/p AICD  - Admit to [facility]  - Given IV NS 2.5 bolus x1 in ED  - On Lasix 20 mg PO qd, Entresto 1 tab BID, Sotalol 40 mg PO BID, Bystolic 5 mg PO qd with hold parameters  - Tolerating Room air  -no signs of acute volume overload  - Strict I/Os, daily weights  - Monitor and replace electrolytes.     Problem/Plan - 6:  ·  Problem: S/P TAVR (transcatheter aortic valve replacement).   ·  Plan: S/p TAVR in 4/2021  - Stable.     Problem/Plan - 7:  ·  Problem: Anemia.   ·  Plan: Likely anemia of chronic disease in the setting of pancreatic cancer  - H/H 9.8/30.0 on admission, baseline hemoglobin unknown  - Currently hemodynamically stable, monitor for signs and symptoms of active bleeding  - Consider transfusion for hemoglobin <8  -continue to monitor thrombocytopenia.     Problem/Plan - 8:  ·  Problem: CAD (coronary artery disease).   ·  Plan: Chronic, s/p cardiac stents x2 (15 years ago), AICD, TAVR (4/2021), bilateral PE (7/2021) on Xarelto  - Continue home medication Xarelto 20 mg PO qd.     Problem/Plan - 9:  ·  Problem: Pancreatic cancer.   ·  Plan: Chronic, diagnosed in March 2021 on chemotherapy (does not recall name of medication)  - Continue home medication Pancrelipase 36,000 2 tabs PO TID with meals  - Pt scheduled for Whipple's procedure on 9/23/2021 with Dr. Shon Murphy at [place].     Problem/Plan - 10:  ·  Problem: Need for prophylactic measure.   ·  Plan; VTE ppx: Continue home Xarelto 20 mg PO qd   80 year old male with PMHx pancreatic cancer (dx 3/2021, currently undergoing chemo), HTN, HLD, CHF (unknown EF), CAD s/p stents x2 (~15 years ago), defibrillator, TAVR (4/2021), bilateral PE (7/2021) on Xarelto, spinal stenosis, GERD, BPH, macular degeneration presents to the ED c/o R foot pain admitted for R hallux pressure injury and RLE cellulitis found to have MSSA bacteremia and acute OM of right hallux.     Problem/Plan - 1:  ·  Problem: Cellulitis of right lower leg and Acute OM of right hallux  ·  Plan: Patient presents with LLE erythema, edema, and pain likely 2/2 cellulitis  - MSSA bacteremia on admission BCx; repeat BCx from 9/16 are NGTD  - Surgical pathology of right hallux: +acute Osteomyelitis   - Podiatry/wound care follow up  - ID recs appreciated - c/w cefazolin 2gm q8h  - Wound care as per Podiatry - plan for OR on Monday for R. hallux amputation   - Pain control  - Elevated leg to alleviate pain&swelling  - check TTE     Problem/Plan - 2:  ·  Problem: Injury of right toe.   ·  Plan: Pt follows Dr. Felipe/Rene for R hallux hammertoe dorsal stage 3 pressure injury  - Fall precautions  - Podiatry (Dr. López) consulted, recs appreciated  - now plan for OR on Monday for R. hallux amputation given OM and bacteremia     Problem/Plan - 3:  ·  Problem: HTN (hypertension).   ·  Plan: Chronic, soft BP noted and BP meds were held this morning. Will resume step-wise  - C/w Lasix 20 mg PO qd, Entresto 1 tab BID, Sotalol 40 mg PO BID with hold parameters  - BP stable 146/78, continue to monitor off Bystolic ( at home on 5 mg PO qd )  - Monitor routine hemodynamics.     Problem/Plan - 4:  ·  Problem: Pulmonary embolism.   ·  Plan: Bilateral PE in 7/2020  - Continue Xarelto 20 mg PO qd  - Patient is scheduled for Whipple's procedure on 9/23/2021 and was advised to stop Xarelto on 9/20 and start Heparin.     Problem/Plan - 5:  ·  Problem: CHF (congestive heart failure).   ·  Plan: Chronic systolic CHF s/p AICD  - Admit to [facility]  - Given IV NS 2.5 bolus x1 in ED  - On Lasix 20 mg PO qd, Entresto 1 tab BID, Sotalol 40 mg PO BID, Bystolic 5 mg PO qd with hold parameters  - Tolerating Room air  -no signs of acute volume overload  - Strict I/Os, daily weights  - Monitor and replace electrolytes.     Problem/Plan - 6:  ·  Problem: S/P TAVR (transcatheter aortic valve replacement).   ·  Plan: S/p TAVR in 4/2021  - Stable.     Problem/Plan - 7:  ·  Problem: Anemia.   ·  Plan: Likely anemia of chronic disease in the setting of pancreatic cancer  - H/H 9.8/30.0 on admission, baseline hemoglobin unknown  - Currently hemodynamically stable, monitor for signs and symptoms of active bleeding  - Consider transfusion for hemoglobin <8  - continue to monitor thrombocytopenia.     Problem/Plan - 8:  ·  Problem: CAD (coronary artery disease).   ·  Plan: Chronic, s/p cardiac stents x2 (15 years ago), AICD, TAVR (4/2021), bilateral PE (7/2021) on Xarelto  - Continue home medication Xarelto 20 mg PO qd.     Problem/Plan - 9:  ·  Problem: Pancreatic cancer.   ·  Plan: Chronic, diagnosed in March 2021 on chemotherapy (does not recall name of medication)  - Continue home medication Pancrelipase 36,000 2 tabs PO TID with meals  - Pt scheduled for Whipple's procedure on 9/23/2021 with Dr. Shon Murphy at [place].     Problem/Plan - 10:  ·  Problem: Need for prophylactic measure.   ·  Plan; VTE ppx: Continue home Xarelto 20 mg PO qd -- will transition to heparin drip tomorrow evening in anticipation of surgery on Monday

## 2021-09-18 LAB
ALBUMIN SERPL ELPH-MCNC: 2.5 G/DL — LOW (ref 3.3–5)
ALP SERPL-CCNC: 119 U/L — SIGNIFICANT CHANGE UP (ref 40–120)
ALT FLD-CCNC: 14 U/L — SIGNIFICANT CHANGE UP (ref 12–78)
ANION GAP SERPL CALC-SCNC: 5 MMOL/L — SIGNIFICANT CHANGE UP (ref 5–17)
APTT BLD: 36.3 SEC — HIGH (ref 27.5–35.5)
AST SERPL-CCNC: 21 U/L — SIGNIFICANT CHANGE UP (ref 15–37)
BASOPHILS # BLD AUTO: 0.01 K/UL — SIGNIFICANT CHANGE UP (ref 0–0.2)
BASOPHILS NFR BLD AUTO: 0.2 % — SIGNIFICANT CHANGE UP (ref 0–2)
BILIRUB SERPL-MCNC: 0.7 MG/DL — SIGNIFICANT CHANGE UP (ref 0.2–1.2)
BUN SERPL-MCNC: 9 MG/DL — SIGNIFICANT CHANGE UP (ref 7–23)
CALCIUM SERPL-MCNC: 8.1 MG/DL — LOW (ref 8.5–10.1)
CHLORIDE SERPL-SCNC: 104 MMOL/L — SIGNIFICANT CHANGE UP (ref 96–108)
CO2 SERPL-SCNC: 28 MMOL/L — SIGNIFICANT CHANGE UP (ref 22–31)
CREAT SERPL-MCNC: 0.66 MG/DL — SIGNIFICANT CHANGE UP (ref 0.5–1.3)
EOSINOPHIL # BLD AUTO: 0.04 K/UL — SIGNIFICANT CHANGE UP (ref 0–0.5)
EOSINOPHIL NFR BLD AUTO: 1 % — SIGNIFICANT CHANGE UP (ref 0–6)
GLUCOSE SERPL-MCNC: 96 MG/DL — SIGNIFICANT CHANGE UP (ref 70–99)
HCT VFR BLD CALC: 27.4 % — LOW (ref 39–50)
HGB BLD-MCNC: 8.9 G/DL — LOW (ref 13–17)
IMM GRANULOCYTES NFR BLD AUTO: 0.5 % — SIGNIFICANT CHANGE UP (ref 0–1.5)
INR BLD: 1.88 RATIO — HIGH (ref 0.88–1.16)
LYMPHOCYTES # BLD AUTO: 0.45 K/UL — LOW (ref 1–3.3)
LYMPHOCYTES # BLD AUTO: 11.1 % — LOW (ref 13–44)
MCHC RBC-ENTMCNC: 32.5 GM/DL — SIGNIFICANT CHANGE UP (ref 32–36)
MCHC RBC-ENTMCNC: 33.8 PG — SIGNIFICANT CHANGE UP (ref 27–34)
MCV RBC AUTO: 104.2 FL — HIGH (ref 80–100)
MONOCYTES # BLD AUTO: 0.62 K/UL — SIGNIFICANT CHANGE UP (ref 0–0.9)
MONOCYTES NFR BLD AUTO: 15.3 % — HIGH (ref 2–14)
NEUTROPHILS # BLD AUTO: 2.91 K/UL — SIGNIFICANT CHANGE UP (ref 1.8–7.4)
NEUTROPHILS NFR BLD AUTO: 71.9 % — SIGNIFICANT CHANGE UP (ref 43–77)
NRBC # BLD: 0 /100 WBCS — SIGNIFICANT CHANGE UP (ref 0–0)
PLATELET # BLD AUTO: 109 K/UL — LOW (ref 150–400)
POTASSIUM SERPL-MCNC: 4.2 MMOL/L — SIGNIFICANT CHANGE UP (ref 3.5–5.3)
POTASSIUM SERPL-SCNC: 4.2 MMOL/L — SIGNIFICANT CHANGE UP (ref 3.5–5.3)
PROT SERPL-MCNC: 6.3 G/DL — SIGNIFICANT CHANGE UP (ref 6–8.3)
PROTHROM AB SERPL-ACNC: 21.3 SEC — HIGH (ref 10.6–13.6)
RBC # BLD: 2.63 M/UL — LOW (ref 4.2–5.8)
RBC # FLD: 15.5 % — HIGH (ref 10.3–14.5)
SODIUM SERPL-SCNC: 137 MMOL/L — SIGNIFICANT CHANGE UP (ref 135–145)
WBC # BLD: 4.05 K/UL — SIGNIFICANT CHANGE UP (ref 3.8–10.5)
WBC # FLD AUTO: 4.05 K/UL — SIGNIFICANT CHANGE UP (ref 3.8–10.5)

## 2021-09-18 PROCEDURE — 99232 SBSQ HOSP IP/OBS MODERATE 35: CPT | Mod: 57

## 2021-09-18 PROCEDURE — 99223 1ST HOSP IP/OBS HIGH 75: CPT

## 2021-09-18 PROCEDURE — 93306 TTE W/DOPPLER COMPLETE: CPT | Mod: 26

## 2021-09-18 PROCEDURE — 99233 SBSQ HOSP IP/OBS HIGH 50: CPT | Mod: GC

## 2021-09-18 RX ORDER — HEPARIN SODIUM 5000 [USP'U]/ML
8000 INJECTION INTRAVENOUS; SUBCUTANEOUS EVERY 6 HOURS
Refills: 0 | Status: DISCONTINUED | OUTPATIENT
Start: 2021-09-18 | End: 2021-09-20

## 2021-09-18 RX ORDER — HEPARIN SODIUM 5000 [USP'U]/ML
INJECTION INTRAVENOUS; SUBCUTANEOUS
Qty: 25000 | Refills: 0 | Status: DISCONTINUED | OUTPATIENT
Start: 2021-09-18 | End: 2021-09-20

## 2021-09-18 RX ORDER — ALBUTEROL 90 UG/1
2.5 AEROSOL, METERED ORAL ONCE
Refills: 0 | Status: COMPLETED | OUTPATIENT
Start: 2021-09-18 | End: 2021-09-18

## 2021-09-18 RX ORDER — HEPARIN SODIUM 5000 [USP'U]/ML
4000 INJECTION INTRAVENOUS; SUBCUTANEOUS EVERY 6 HOURS
Refills: 0 | Status: DISCONTINUED | OUTPATIENT
Start: 2021-09-18 | End: 2021-09-20

## 2021-09-18 RX ORDER — HEPARIN SODIUM 5000 [USP'U]/ML
8000 INJECTION INTRAVENOUS; SUBCUTANEOUS ONCE
Refills: 0 | Status: DISCONTINUED | OUTPATIENT
Start: 2021-09-18 | End: 2021-09-18

## 2021-09-18 RX ADMIN — LATANOPROST 1 DROP(S): 0.05 SOLUTION/ DROPS OPHTHALMIC; TOPICAL at 21:11

## 2021-09-18 RX ADMIN — Medication 20 MILLIGRAM(S): at 06:44

## 2021-09-18 RX ADMIN — Medication 1 DROP(S): at 11:36

## 2021-09-18 RX ADMIN — Medication 5 MILLIGRAM(S): at 22:09

## 2021-09-18 RX ADMIN — Medication 2 CAPSULE(S): at 08:34

## 2021-09-18 RX ADMIN — Medication 2 CAPSULE(S): at 11:37

## 2021-09-18 RX ADMIN — Medication 100 MILLIGRAM(S): at 11:41

## 2021-09-18 RX ADMIN — OXYCODONE AND ACETAMINOPHEN 1 TABLET(S): 5; 325 TABLET ORAL at 23:00

## 2021-09-18 RX ADMIN — GABAPENTIN 300 MILLIGRAM(S): 400 CAPSULE ORAL at 17:47

## 2021-09-18 RX ADMIN — PANTOPRAZOLE SODIUM 40 MILLIGRAM(S): 20 TABLET, DELAYED RELEASE ORAL at 06:44

## 2021-09-18 RX ADMIN — GABAPENTIN 300 MILLIGRAM(S): 400 CAPSULE ORAL at 06:43

## 2021-09-18 RX ADMIN — OXYCODONE AND ACETAMINOPHEN 1 TABLET(S): 5; 325 TABLET ORAL at 22:08

## 2021-09-18 RX ADMIN — Medication 100 MILLIGRAM(S): at 05:29

## 2021-09-18 RX ADMIN — Medication 400 UNIT(S): at 11:36

## 2021-09-18 RX ADMIN — Medication 100 MILLIGRAM(S): at 14:35

## 2021-09-18 RX ADMIN — OXYCODONE AND ACETAMINOPHEN 1 TABLET(S): 5; 325 TABLET ORAL at 12:30

## 2021-09-18 RX ADMIN — OXYCODONE AND ACETAMINOPHEN 1 TABLET(S): 5; 325 TABLET ORAL at 11:30

## 2021-09-18 RX ADMIN — Medication 1 SPRAY(S): at 17:48

## 2021-09-18 RX ADMIN — Medication 100 MILLIGRAM(S): at 06:43

## 2021-09-18 RX ADMIN — Medication 1 SPRAY(S): at 06:43

## 2021-09-18 RX ADMIN — DORZOLAMIDE HYDROCHLORIDE TIMOLOL MALEATE 1 DROP(S): 20; 5 SOLUTION/ DROPS OPHTHALMIC at 06:43

## 2021-09-18 RX ADMIN — Medication 2 CAPSULE(S): at 17:47

## 2021-09-18 RX ADMIN — ALBUTEROL 2.5 MILLIGRAM(S): 90 AEROSOL, METERED ORAL at 20:26

## 2021-09-18 RX ADMIN — Medication 100 MILLIGRAM(S): at 21:11

## 2021-09-18 RX ADMIN — Medication 100 MILLIGRAM(S): at 21:07

## 2021-09-18 RX ADMIN — DULOXETINE HYDROCHLORIDE 60 MILLIGRAM(S): 30 CAPSULE, DELAYED RELEASE ORAL at 11:36

## 2021-09-18 RX ADMIN — DORZOLAMIDE HYDROCHLORIDE TIMOLOL MALEATE 1 DROP(S): 20; 5 SOLUTION/ DROPS OPHTHALMIC at 17:47

## 2021-09-18 RX ADMIN — Medication 1 TABLET(S): at 11:36

## 2021-09-18 RX ADMIN — HEPARIN SODIUM 1800 UNIT(S)/HR: 5000 INJECTION INTRAVENOUS; SUBCUTANEOUS at 18:29

## 2021-09-18 RX ADMIN — Medication 10 MILLIGRAM(S): at 08:38

## 2021-09-18 NOTE — PROGRESS NOTE ADULT - SUBJECTIVE AND OBJECTIVE BOX
80y year old Male seen at [facility] bedside for Right Hallux infection, possible Osteomyelitis- s/p Right Hallux Bone Biopsy and Wound Debridement by Dr. López on 9/15/2021, . The patient states that he had this wound for more than several months. 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 The patient states he has been following at the [place] wound care center/ hyperbaric center and he has been receiving local wound care. The patient states that his daughter has been doing the dressing changes at home. The patient was sent from the wound care center to the ED for further evaluation and to receive IV antibiotics. The patient also states that he has a whipple procedure that is scheduled to be performed in two weeks. Denies any fever, chills, nausea, vomiting, chest pain, shortness of breath, or calf pain at this time.    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    Benign testicular tumor    History of hip replacement        Allergies    No Known Allergies    Intolerances        MEDICATIONS  (STANDING):  cefepime   IVPB 2000 milliGRAM(s) IV Intermittent every 8 hours  cholecalciferol 400 Unit(s) Oral daily  dorzolamide 2%/timolol 0.5% Ophthalmic Solution 1 Drop(s) Both EYES two times a day  DULoxetine 60 milliGRAM(s) Oral daily  furosemide    Tablet 20 milliGRAM(s) Oral daily  gabapentin 300 milliGRAM(s) Oral two times a day  ketorolac 0.5% Ophthalmic Solution 1 Drop(s) Both EYES daily  latanoprost 0.005% Ophthalmic Solution 1 Drop(s) Both EYES at bedtime  melatonin 5 milliGRAM(s) Oral at bedtime  multivitamin 1 Tablet(s) Oral daily  pancrelipase  (CREON 36,000 Lipase Units) 2 Capsule(s) Oral three times a day with meals  pantoprazole    Tablet 40 milliGRAM(s) Oral before breakfast  pyridoxine 100 milliGRAM(s) Oral daily  rivaroxaban 20 milliGRAM(s) Oral with dinner  sacubitril 49 mG/valsartan 51 mG 1 Tablet(s) Oral two times a day  sotalol 40 milliGRAM(s) Oral two times a day  tamsulosin 0.4 milliGRAM(s) Oral at bedtime  vancomycin  IVPB 1250 milliGRAM(s) IV Intermittent every 12 hours    MEDICATIONS  (PRN):  acetaminophen   Tablet .. 650 milliGRAM(s) Oral every 6 hours PRN Temp greater or equal to 38C (100.4F), Mild Pain (1 - 3)      Social History:  Tobacco: denies  EtOH: occasional  Recreational drug use: denies  Lives: alone  Ambulates: cane  ADLs: independent  Vaccinations: Pf[employer] x2 2/2021 (14 Sep 2021 23:11)      FAMILY HISTORY:  Family history of stroke (Mother)      Vital Signs Last 24 Hrs  T(C): 36.7 (18 Sep 2021 12:16), Max: 37.3 (17 Sep 2021 20:05)  T(F): 98.1 (18 Sep 2021 12:16), Max: 99.2 (17 Sep 2021 20:05)  HR: 83 (18 Sep 2021 12:16) (83 - 90)  BP: 121/74 (18 Sep 2021 12:16) (104/61 - 121/74)  BP(mean): --  RR: 17 (18 Sep 2021 12:16) (17 - 18)  SpO2: 94% (18 Sep 2021 12:16) (93% - 96%)    PHYSICAL EXAM:  Vascular: DP & PT faintly palpable bilaterally, Capillary refill 3 seconds, No Digital hair present bilaterally, edema and erythema along the right hallux, skin temperature slightly warmer compared to the contralateral   Neurological: Loss of protective sensation b/l  Musculoskeletal: 5/5 strength in all quadrants bilaterally, AJ & STJ ROM intact, no pain upon palpation of the right hallux, rigid non reducible digital contracture at the IPJ of the right hallux.  Dermatological:   Pre- Debridement: Bullae along the distal lateral aspect of the right hallux, more than 1 cmx 1cm in size with serous drainage and full thickness wound along the anterior medial aspect of the right hallux - size 0.2cmx0.2cm- macerated borders, red granular base, moderate serous sanguineous drainage, does not probe to bone, no tunneling no undermining , no malodoro  Post Debridement & Bedside Bone Biopsy- Full thickness wound along the dorsal aspect of the right hallux, size 1.5cmx0.2cmx0.2cm- probes to bone, partial thickness wound along the distal lateral aspect after drainage of more than 2cc of serous fluid, probes to bone, tunneling and undermining, no malodor and sanguineous drainage     CBC Full  -  ( 18 Sep 2021 09:00 )  WBC Count : 4.05 K/uL  RBC Count : 2.63 M/uL  Hemoglobin : 8.9 g/dL  Hematocrit : 27.4 %  Platelet Count - Automated : 109 K/uL  Mean Cell Volume : 104.2 fl  Mean Cell Hemoglobin : 33.8 pg  Mean Cell Hemoglobin Concentration : 32.5 gm/dL  Auto Neutrophil # : 2.91 K/uL  Auto Lymphocyte # : 0.45 K/uL  Auto Monocyte # : 0.62 K/uL  Auto Eosinophil # : 0.04 K/uL  Auto Basophil # : 0.01 K/uL  Auto Neutrophil % : 71.9 %  Auto Lymphocyte % : 11.1 %  Auto Monocyte % : 15.3 %  Auto Eosinophil % : 1.0 %  Auto Basophil % : 0.2 %      ----------CHEM PANEL----------    09-18    137  |  104  |  9   ----------------------------<  96  4.2   |  28  |  0.66    Ca    8.1<L>      18 Sep 2021 09:00    TPro  6.3  /  Alb  2.5<L>  /  TBili  0.7  /  DBili  x   /  AST  21  /  ALT  14  /  AlkPhos  119  09-18                Imaging:   EXAM: XR FOOT COMP MIN 3 VIEWS RT    EXAM: XR ANKLE COMP MIN 3 VIEWS RT    EXAM: XR CHEST AP OR PA 1V    EXAM: XR TIB FIB AP LAT 2 VIEWS RT      PROCEDURE DATE: 09/14/2021        INTERPRETATION: Chest and right tib-fib, ankle, and foot. Patient has cellulitis of the right leg and wound care the foot. There is history of pancreatic cancer.    AP chest on September 14, 2021 at 8:51 PM.    Heart magnified by technique. Extensive is thoracolumbar hardware left-sided [id number] remain.    Mild right thoracic curve again noted.    Lungs remain clear.    Present study shows a right MediPort new since June 12, 2020.      Right tib-fib. AP and lateral views. Arterial calcifications. No knee effusion.    Moderate to advanced knee degeneration. No bone destruction or fracture.    Right ankle. 3 views. Arterial calcification and ankle edema. No bone destruction or fracture.    Right foot. 3 views.    Partial subluxation of the right first MTP joint with associated degeneration again noted.    Scattered slight IP degeneration again seen. No fracture or radiographic bone destruction.    Right foot is similar to August 4.    IMPRESSION: No acute chest finding. Ankle edema and degenerative changes in the foot again seen. Fairly advanced right knee degeneration also again noted.    --- End of Report ---            JODI GURROLA MD; Attending Radiologist  This document has been electronically signed. Sep 15 2021 11:45AM      ACCESSION No:  [id number]  Patient:   JUANIS DE SANTIAGO      Accession:                             [id number]    Collected Date/Time:                   9/15/2021 15:48 EDT  Received Date/Time:                    9/16/2021 07:25 EDT    Surgical Pathology Report - Auth (Verified)    Specimen(s) Submitted  1  Right hallux    Final Diagnosis  Bone, hallux, right, biopsy:  - Acute osteomyelitis.    Verified by: Loraine Sinclair M.D.  (Electronic Signature)  Reported on: 09/17/21 11:10 EDT, [facility], [address] [place] [address]  Phone: [phone]   Fax: [phone]  _________________________________________________________________    Clinical Information  Rule out osteomyelitis    Procedure: Right big toe bone biopsy    Gross Description  Received:  In formalin labeled  "right toe" , consisting of multiple  pieces  of tan-yellow bony tissue  Dimensions:  1.5 x 1.4 x 0.2 cm aggregate  Submitted:  Entirely following decalcification in one cassette: 1A  RAQUEL Zhang ([facility]) 09/16/2021 10:52 AM    Disclaimer  In addition to other data that may appear on the specimen containers, all  labels have been inspected to confirm the presence of the patient's name  and date of birth.  Histological processing performed at [facility], Department of  Pathology, [address] [place] [address], [place], [place].

## 2021-09-18 NOTE — PROGRESS NOTE ADULT - ASSESSMENT
Full Thickness Wound Dorsal Right Hallux w/ Rigid digital contracture at the IPJ- soft tissue infection- s/p Right Hallux Bone biopsy and Wound Debridement at bedside with Dr. óLpez on 9/15/2021     Bone Biopsy pathology results came back as Acute Osteomyelitis, Dr. López spoke with the patient and had a discussion of the treatment options    Patient is currently scheduled for Right 1st Partial Ray Resection with Dr. López on Monday 9/20/2021 as an add on   Patient will require medical and cardiac clearance     Podiatry team will continue to follow patient while in house

## 2021-09-18 NOTE — PROGRESS NOTE ADULT - ASSESSMENT
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     Problem/Plan - 1:  ·  Problem: Cellulitis of right lower leg and Acute OM of right hallux  ·  Plan: Patient presents with LLE erythema, edema, and pain likely 2/2 cellulitis  - MSSA bacteremia on admission BCx; repeat BCx from 9/16 are NGTD  - Surgical pathology of right hallux: +acute Osteomyelitis, staph aureus   - Podiatry/wound care follow up  - ID recs appreciated - c/w cefazolin 2gm q8h  - Wound care as per Podiatry - plan for OR on Monday for R. hallux amputation   - Pain control  - Elevated leg to alleviate pain&swelling  - check TTE     Problem/Plan - 2:  ·  Problem: Injury of right toe.   ·  Plan: Pt follows Dr. Felipe/Rene for R hallux hammertoe dorsal stage 3 pressure injury  - Fall precautions  - Podiatry (Dr. López) consulted, recs appreciated  - now plan for OR on Monday for R. hallux amputation given OM and bacteremia     Problem/Plan - 3:  ·  Problem: HTN (hypertension).   - C/w Lasix 20 mg PO qd, Entresto 1 tab BID, Sotalol 40 mg PO BID with hold parameters  - BP stable, continue to monitor off Bystolic ( at home on 5 mg PO qd )  - Monitor routine hemodynamics.     Problem/Plan - 4:  ·  Problem: Pulmonary embolism.   ·  Plan: Bilateral PE in 7/2020  - heparin gtt - start 24hrs after last dose of Xarelto, DC 4-5hrs before surgery  - Patient is scheduled for Whipple's procedure on 9/23/2021 and was advised to stop Xarelto on 9/20 and start Heparin.     Problem/Plan - 5:  ·  Problem: CHF (congestive heart failure).   ·  Plan: Chronic systolic CHF s/p AICD  - On Lasix 20 mg PO qd, Entresto 1 tab BID, Sotalol 40 mg PO BID, Bystolic 5 mg PO qd with hold parameters  - Tolerating Room air  -no signs of acute volume overload  - Strict I/Os, daily weights  - Monitor and replace electrolytes.     Problem/Plan - 6:  ·  Problem: S/P TAVR (transcatheter aortic valve replacement).   ·  Plan: S/p TAVR in 4/2021  - Stable.     Problem/Plan - 7:  ·  Problem: Anemia.   ·  Plan: Likely anemia of chronic disease in the setting of pancreatic cancer  - H/H 9.8/30.0 on admission, baseline hemoglobin unknown  - Currently hemodynamically stable, monitor for signs and symptoms of active bleeding  - Consider transfusion for hemoglobin <8  - continue to monitor thrombocytopenia.     Problem/Plan - 8:  ·  Problem: CAD (coronary artery disease).   ·  Plan: Chronic, s/p cardiac stents x2 (15 years ago), AICD, TAVR (4/2021), bilateral PE (7/2021) on Xarelto  - home Xarelto, c/w heparin gtt      Problem/Plan - 9:  ·  Problem: Pancreatic cancer.   ·  Plan: Chronic, diagnosed in March 2021 on chemotherapy (does not recall name of medication)  - Continue home medication Pancrelipase 36,000 2 tabs PO TID with meals  - Pt scheduled for Whipple's procedure on 9/23/2021 with Dr. Shon Murphy at [place].     Problem/Plan - 10:  ·  Problem: Need for prophylactic measure.   ·  Plan; VTE ppx: - heparin gtt - start 24hrs after last dose of Xarelto, DC 4-5hrs before surgery   80 year old male with PMHx pancreatic cancer (dx 3/2021, currently undergoing chemo), HTN, HLD, CHF (unknown EF), CAD s/p stents x2 (~15 years ago), defibrillator, TAVR (4/2021), bilateral PE (7/2021) on Xarelto, spinal stenosis, GERD, BPH, macular degeneration presents to the ED c/o R foot pain admitted for R hallux pressure injury and RLE cellulitis found to have MSSA bacteremia and acute OM of right hallux.     Problem/Plan - 1:  ·  Problem: Cellulitis of right lower leg and Acute OM of right hallux  ·  Plan: Patient presents with LLE erythema, edema, and pain likely 2/2 cellulitis  - MSSA bacteremia on admission BCx; repeat BCx from 9/16 are NGTD  - Surgical pathology of right hallux: +acute Osteomyelitis, staph aureus   - Podiatry/wound care follow up  - ID recs appreciated - c/w cefazolin 2gm q8h  - Wound care as per Podiatry - plan for OR on Monday for R. hallux amputation   - Pain control  - Elevated leg to alleviate pain&swelling  - f/up TTE  - cardiology ([facility]) pre-op eval, recs appreciated     Problem/Plan - 2:  ·  Problem: Injury of right toe.   ·  Plan: Pt follows Dr. Felipe/Rene for R hallux hammertoe dorsal stage 3 pressure injury  - Fall precautions  - Podiatry (Dr. López) consulted, recs appreciated  - now plan for OR on Monday for R. hallux amputation given OM and bacteremia     Problem/Plan - 3:  ·  Problem: HTN (hypertension).   - C/w Lasix 20 mg PO qd, Entresto 1 tab BID, Sotalol 40 mg PO BID with hold parameters  - BP low normal, continue to monitor off Bystolic ( at home on 5mg PO qd )  - Monitor routine hemodynamics.     Problem/Plan - 4:  ·  Problem: Pulmonary embolism.   ·  Plan: Bilateral PE in 7/2020  - heparin gtt - start 24hrs after last dose of Xarelto, DC 4-5hrs before surgery  - Patient is scheduled for Whipple's procedure on 9/23/2021 and was advised to stop Xarelto on 9/20 and start Heparin.     Problem/Plan - 5:  ·  Problem: CHF (congestive heart failure).   ·  Plan: Chronic systolic CHF s/p AICD  - On Lasix 20 mg PO qd, Entresto 1 tab BID, Sotalol 40 mg PO BID with hold parameters  - Tolerating Room air  - no signs of acute volume overload  - Strict I/Os, daily weights  - Monitor and replace electrolytes.  - f/up TTE     Problem/Plan - 6:  ·  Problem: S/P TAVR (transcatheter aortic valve replacement).   ·  Plan: S/p TAVR in 4/2021  - Stable.     Problem/Plan - 7:  ·  Problem: Anemia.   ·  Plan: Likely anemia of chronic disease in the setting of pancreatic cancer  - H/H 9.8/30.0 on admission, baseline hemoglobin unknown  - Currently hemodynamically stable, monitor for signs and symptoms of active bleeding  - Consider transfusion for hemoglobin <8  - continue to monitor thrombocytopenia.     Problem/Plan - 8:  ·  Problem: CAD (coronary artery disease).   ·  Plan: Chronic, s/p cardiac stents x2 (15 years ago), AICD, TAVR (4/2021), bilateral PE (7/2021) on Xarelto  - home Xarelto, c/w heparin gtt      Problem/Plan - 9:  ·  Problem: Pancreatic cancer.   ·  Plan: Chronic, diagnosed in March 2021 on chemotherapy (does not recall name of medication)  - Continue home medication Pancrelipase 36,000 2 tabs PO TID with meals  - Pt scheduled for Whipple's procedure on 9/23/2021 with Dr. Shon Murphy at [place].     Problem/Plan - 10:  ·  Problem: Need for prophylactic measure.   ·  Plan; VTE ppx: - heparin gtt - start 24hrs after last dose of Xarelto, DC 4-5hrs before surgery

## 2021-09-18 NOTE — CONSULT NOTE ADULT - SUBJECTIVE AND OBJECTIVE BOX
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 Patient states he was advised to go the wound care center by his oncologist at [place] due to increased redness and edema to right lower leg. Pt follows podiatry, Dr. Felipe for right toe wound. Pt was seen by podiatry at the wound care center and was sent to the ED for further management. Denies fever, chills, chest pain, shortness of breath, abdominal pain, nausea, vomiting.  In the ED,  Vital Signs T(F): 100.9 HR: 91 BP: 125/67 RR: 18 SpO2: 100%  Labs significant for: ESR 53, H/H 9.8/30, INR 2.82, Na 132, Tbili 1.3  CXR: no acute infiltrates, hardware noted  XR right foot: no gas seen - soft tissue swelling by hallux  EKG: SR at 97bpm, LBBB (14 Sep 2021 23:11)    INTERVAL HPI:  We were asked to consult on this patient for a left UE hematoma.  Patient reports sustaining a contusion with resulting hematoma of the left forearm (just distal to the elbow) last month.  Denies any pain in the left elbow/forearm/wrist/hand/fingers.  Denies any recent injuries or trauma to the left UE.  Reports some resolution, though recently it "got bigger".  Patient reports sustaining the injury about a month ago - he reports prior to beginning AC for bilateral PE (though per chart review, AC was started prior).  Currently on Heparin gtt.  With the exception of an unsightly bump near the left elbow, patient denies any pain, paraesthesias, or decreased function in the left UE.         PAST MEDICAL/SURGICAL HISTORY:  HTN (hypertension)    HLD (hyperlipidemia)    GERD (gastroesophageal reflux disease)    Cardiomyopathy    History of total hip replacement  left    History of laminectomy    ICD (implantable cardiac defibrillator) in place    Benign testicular tumor    History of hip replacement    REVIEW OF SYSTEMS:  General/Constitutional: No acute distress, no weakness, fevers, or chills - +awake and alert  HEENT: Denies auditory or visual changes/disturbances, no vertigo, no throat pain, no dysphagia    Neck: Denies neck pain/stiffness, denies swelling/lumps/hoarseness   Lymphatic: Denies lumps or swelling in the axillae, groin, or neck bilaterally   Respiratory: Denies cough/hemoptysis, denies wheezing/SOB/dyspnea  Cardiac: Denies chest pain, palpitations  Abdomen: Denies abdominal bloating/fullness, nausea or vomiting, denies abdominal pain  Extremities: Right hallux ulcer/cellulitis   Genitourinary: Denies urinary issues or complaints, denies dysuria/hematuria  Neuro: Denies weakness, paraesthesias, paralysis, syncope, loss of vision  Skin: Denies pruritus, pain, rashes  Psych: Denies hallucinations, visual disturbances, or depression    MEDICATIONS:  Home Medications:  acetaminophen 325 mg oral tablet: 2 tab(s) orally every 6 hours, As needed, For Temp over 37.9 C (100.2 F) (15 Sep 2021 00:45)  ALPRAZolam 0.25 mg oral tablet: 1 tab(s) orally 3 times a day, As Needed (15 Sep 2021 00:45)  Bystolic 5 mg oral tablet: 1 tab(s) orally once a day (15 Sep 2021 00:45)  cefadroxil 500 mg oral capsule: 1 cap(s) orally every 12 hours (15 Sep 2021 00:45)  cholecalciferol 400 intl units (10 mcg) oral tablet: 1 tab(s) orally once a day (15 Sep 2021 00:45)  Coenzyme Q10 200 mg oral capsule: 1 tab(s) orally once a day (15 Sep 2021 00:45)  dorzolamide-timolol 2.23%-0.68% ophthalmic solution: 1 drop(s) to each affected eye 2 times a day (15 Sep 2021 00:45)  DULoxetine 60 mg oral delayed release capsule: 1 cap(s) orally once a day (15 Sep 2021 00:45)  Entresto 49 mg-51 mg oral tablet: 1 tab(s) orally 2 times a day (15 Sep 2021 00:45)  furosemide 20 mg oral tablet: 1 tab(s) orally once a day (15 Sep 2021 00:45)  gabapentin 300 mg oral tablet: 1 tab(s) orally 2 times a day (15 Sep 2021 00:45)  ketorolac 0.5% ophthalmic solution: 1 drop(s) to each affected eye once a day (15 Sep 2021 00:45)  latanoprost 0.005% ophthalmic solution: 1 drop(s) to each affected eye once a day (in the evening) (15 Sep 2021 00:45)  lidocaine-prilocaine 2.5%-2.5% topical cream: Apply topically to affected area once on day of treament for 1 dose (15 Sep 2021 00:45)  Multiple Vitamins oral tablet: 1 tab(s) orally once a day (15 Sep 2021 00:45)  omeprazole 20 mg oral delayed release tablet: 1 tab(s) orally once a day (15 Sep 2021 00:45)  pancrelipase 36,000 units-114,000 units-180,000 units oral delayed release capsule: 2 cap(s) orally 3 times a day (15 Sep 2021 00:45)  prochlorperazine 10 mg oral capsule, extended release:  (15 Sep 2021 00:45)  sotalol 80 mg oral tablet: 0.5 tab(s) orally 2 times a day (15 Sep 2021 00:45)  tamsulosin 0.4 mg oral capsule: 1 cap(s) orally once a day (15 Sep 2021 00:45)  Vitamin B6 100 mg oral tablet: 1 tab(s) orally once a day (15 Sep 2021 00:45)  Xarelto 20 mg oral tablet: 1 tab(s) orally once a day (in the evening) (15 Sep 2021 00:45)    MEDICATIONS  (STANDING):  benzonatate 100 milliGRAM(s) Oral three times a day  ceFAZolin   IVPB 2000 milliGRAM(s) IV Intermittent every 8 hours  cholecalciferol 400 Unit(s) Oral daily  dorzolamide 2%/timolol 0.5% Ophthalmic Solution 1 Drop(s) Both EYES two times a day  DULoxetine 60 milliGRAM(s) Oral daily  fluticasone propionate 50 MICROgram(s)/spray Nasal Spray 1 Spray(s) Both Nostrils every 12 hours  furosemide    Tablet 20 milliGRAM(s) Oral daily  gabapentin 300 milliGRAM(s) Oral two times a day  heparin  Infusion.  Unit(s)/Hr (18 mL/Hr) IV Continuous <Continuous>  ketorolac 0.5% Ophthalmic Solution 1 Drop(s) Both EYES daily  latanoprost 0.005% Ophthalmic Solution 1 Drop(s) Both EYES at bedtime  melatonin 5 milliGRAM(s) Oral at bedtime  multivitamin 1 Tablet(s) Oral daily  pancrelipase  (CREON 36,000 Lipase Units) 2 Capsule(s) Oral three times a day with meals  pantoprazole    Tablet 40 milliGRAM(s) Oral before breakfast  pyridoxine 100 milliGRAM(s) Oral daily  sacubitril 49 mG/valsartan 51 mG 1 Tablet(s) Oral two times a day  sotalol 40 milliGRAM(s) Oral two times a day  tamsulosin 0.4 milliGRAM(s) Oral at bedtime    MEDICATIONS  (PRN):  acetaminophen   Tablet .. 650 milliGRAM(s) Oral every 6 hours PRN Temp greater or equal to 38C (100.4F), Mild Pain (1 - 3)  ALPRAZolam 0.25 milliGRAM(s) Oral at bedtime PRN anxiety  guaiFENesin Oral Liquid (Sugar-Free) 200 milliGRAM(s) Oral every 6 hours PRN Cough  heparin   Injectable 8000 Unit(s) IV Push every 6 hours PRN For aPTT less than 40  heparin   Injectable 4000 Unit(s) IV Push every 6 hours PRN For aPTT between 40 - 57  oxycodone    5 mG/acetaminophen 325 mG 1 Tablet(s) Oral every 6 hours PRN Moderate Pain (4 - 6)  prochlorperazine   Tablet 10 milliGRAM(s) Oral daily PRN nausea or vomiting    ALLERGIES:  No Known Allergies    SOCIAL HISTORY:  Tobacco: denies  EtOH: occasional  Recreational drug use: denies  Lives: alone  Ambulates: cane  ADLs: independent  Vaccinations: Pf[employer] x2 2/2021 (14 Sep 2021 23:11)    FAMILY HISTORY:  Family history of stroke (Mother)    VITAL SIGNS:  Vital Signs Last 24 Hrs  T(C): 36.7 (18 Sep 2021 12:16), Max: 37.3 (17 Sep 2021 20:05)  T(F): 98.1 (18 Sep 2021 12:16), Max: 99.2 (17 Sep 2021 20:05)  HR: 83 (18 Sep 2021 12:16) (83 - 90)  BP: 121/74 (18 Sep 2021 12:16) (104/61 - 121/74)  BP(mean): --  RR: 17 (18 Sep 2021 12:16) (17 - 17)  SpO2: 94% (18 Sep 2021 12:16) (93% - 96%)    PHYSICAL EXAM:  General: No acute distress, appears comfortable, conversant, well nourished, well-groomed, appears stated age  Head, Eyes, Ears, Nose, Throat: Normal cephalic/atraumatic, SARINA, EOMI, , anicteric, conjunctiva non injected and moist, vision grossly intact, hearing grossly intact, no nasal discharge, ears and nose symmetrical and atraumatic.  Nasal, oral, and oropharyngeal mucosa pink moist with no evidence of ulceration  Neck: Supple, carotids have good upstroke, trachea in the midline, without JVD or thyromegaly  Lymphatic: No evidence of masses or lymphadenopathy in the head, neck, trunk, axillary, inguinal, or supraclavicular regions  Chest: Lungs are clear to P&A, no wheezing, no rales, no ronchi, with good inspiratory effort  Heart: Heart rhythm regular, no murmurs  Abdomen: Soft, non tender, good bowel sounds present in all four quadrants.  No guarding, rebound, and no peritoneal signs.  No evidence of hepatosplenomegaly.  No evidence of abdominal wall hernias.  Inguinal regions are unremarkable with no evidence of hernias.   Extremity: Left upper extremity: Non-tender hematoma with some surrounding ecchymosis noted dorsal proximal forearm, just distal to olecranon.  No erythema, no increased warmth, no signs of infection appreciated.  No tenderness appreciated anywhere through the elbow/forearm/wrist/hand/fingers.  FROM of the left shoulder, elbow, wrist, hand, and fingers noted.  Full 5/5 strength/motor function of the left UE in all joints.  Forearm compartments soft and compressible.    Bilat LE: No swelling, or open sores, no gross deformities,  good range of motion, 2+ peripheral pulses bilat, no edema,  negative David's sign, no lymphadenopathy  Neuro: Alert and oriented x3, motor and sensory intact  Psychiatric: Awake , alert, oriented x3 with an appropriate affect.   Skin: Good color, turgor, texture with no gross lesions, no eruptions, no rashes, no subcutaneous nodules and normal temperature.     LABS:                        8.9    4.05  )-----------( 109      ( 18 Sep 2021 09:00 )             27.4     09-18    137  |  104  |  9   ----------------------------<  96  4.2   |  28  |  0.66    Ca    8.1<L>      18 Sep 2021 09:00    TPro  6.3  /  Alb  2.5<L>  /  TBili  0.7  /  DBili  x   /  AST  21  /  ALT  14  /  AlkPhos  119  09-18    PT/INR - ( 18 Sep 2021 09:00 )   PT: 21.3 sec;   INR: 1.88 ratio         PTT - ( 18 Sep 2021 09:00 )  PTT:36.3 sec    LIVER FUNCTIONS - ( 18 Sep 2021 09:00 )  Alb: 2.5 g/dL / Pro: 6.3 g/dL / ALK PHOS: 119 U/L / ALT: 14 U/L / AST: 21 U/L / GGT: x             Culture - Blood (collected 16 Sep 2021 12:01)  Source: .Blood Blood-Peripheral  Preliminary Report (17 Sep 2021 13:02):    No growth to date.    Culture - Blood (collected 16 Sep 2021 12:01)  Source: .Blood Blood-Peripheral  Preliminary Report (17 Sep 2021 13:02):    No growth to date.    Culture - Tissue with Gram Stain (collected 16 Sep 2021 01:16)  Source: .Tissue right hallux bone culture  Gram Stain (16 Sep 2021 04:48):    Rare polymorphonuclear leukocytes seen per low power field    No organisms seen per oil power field  Preliminary Report (17 Sep 2021 09:03):    Few Staphylococcus aureus  Organism: Staphylococcus aureus (18 Sep 2021 13:19)  Organism: Staphylococcus aureus (18 Sep 2021 13:19)      ASSESSMENT:  80 year old male with PMHx pancreatic cancer (dx 3/2021, currently undergoing chemo), HTN, HLD, CHF (unknown EF) CAD s/p stents x2 (~15 years ago), defibrillator, 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 Russo  Resolving hematoma left forearm, with stable vitals and hemodynamics  Would continue conservative management with periodic warm soaks  No invasive or surgical intervention warranted or indicated at this time  Continue current care per primary team

## 2021-09-18 NOTE — CONSULT NOTE ADULT - SUBJECTIVE AND OBJECTIVE BOX
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 Patient states he was advised to go the wound care center by his oncologist at [place] due to increased redness and edema to right lower leg. Pt follows podiatry, Dr. Felipe for right toe wound. Pt was seen by podiatry at the wound care center and was sent to the ED for further management. Denies fever, chills, chest pain, shortness of breath, abdominal pain, nausea, vomiting.    He Is currently resting comfortably without complaints. He reports no chest pain, dyspnea, palpitations, lightheadedness, or syncope    PAST MEDICAL & SURGICAL HISTORY:  HTN (hypertension)    HLD (hyperlipidemia)    GERD (gastroesophageal reflux disease)    Cardiomyopathy    History of total hip replacement  left    History of laminectomy    ICD (implantable cardiac defibrillator) in place    Benign testicular tumor    History of hip replacement        SOCIAL HISTORY: no tob/etoh    FAMILY HISTORY: FAMILY HISTORY:  Family history of stroke (Mother)        MEDICATIONS  (STANDING):  benzonatate 100 milliGRAM(s) Oral three times a day  ceFAZolin   IVPB 2000 milliGRAM(s) IV Intermittent every 8 hours  cholecalciferol 400 Unit(s) Oral daily  dorzolamide 2%/timolol 0.5% Ophthalmic Solution 1 Drop(s) Both EYES two times a day  DULoxetine 60 milliGRAM(s) Oral daily  fluticasone propionate 50 MICROgram(s)/spray Nasal Spray 1 Spray(s) Both Nostrils every 12 hours  furosemide    Tablet 20 milliGRAM(s) Oral daily  gabapentin 300 milliGRAM(s) Oral two times a day  ketorolac 0.5% Ophthalmic Solution 1 Drop(s) Both EYES daily  latanoprost 0.005% Ophthalmic Solution 1 Drop(s) Both EYES at bedtime  melatonin 5 milliGRAM(s) Oral at bedtime  multivitamin 1 Tablet(s) Oral daily  pancrelipase  (CREON 36,000 Lipase Units) 2 Capsule(s) Oral three times a day with meals  pantoprazole    Tablet 40 milliGRAM(s) Oral before breakfast  pyridoxine 100 milliGRAM(s) Oral daily  sacubitril 49 mG/valsartan 51 mG 1 Tablet(s) Oral two times a day  sotalol 40 milliGRAM(s) Oral two times a day  tamsulosin 0.4 milliGRAM(s) Oral at bedtime    MEDICATIONS  (PRN):  acetaminophen   Tablet .. 650 milliGRAM(s) Oral every 6 hours PRN Temp greater or equal to 38C (100.4F), Mild Pain (1 - 3)  ALPRAZolam 0.25 milliGRAM(s) Oral at bedtime PRN anxiety  guaiFENesin Oral Liquid (Sugar-Free) 200 milliGRAM(s) Oral every 6 hours PRN Cough  oxycodone    5 mG/acetaminophen 325 mG 1 Tablet(s) Oral every 6 hours PRN Moderate Pain (4 - 6)  prochlorperazine   Tablet 10 milliGRAM(s) Oral daily PRN nausea or vomiting      Allergies    No Known Allergies    Intolerances        REVIEW OF SYSTEMS:    CONSTITUTIONAL: No weakness, fevers or chills  EYES: No visual changes, No diplopia  ENMT: No throat pain , No exudate  NECK: No pain or stiffness  RESPIRATORY: No cough, wheezing, hemoptysis; No shortness of breath  CARDIOVASCULAR: No chest pain or palpitations  GASTROINTESTINAL: No abdominal pain. No nausea, vomiting, or hematemesis; No diarrhea or constipation. No melena or hematochezia.  GENITOURINARY: No dysuria, frequency or hematuria  NEUROLOGICAL: No numbness or weakness  SKIN: No itching or rash  All other review of systems is negative unless indicated above    VITAL SIGNS:   Vital Signs Last 24 Hrs  T(C): 36.7 (18 Sep 2021 12:16), Max: 37.3 (17 Sep 2021 20:05)  T(F): 98.1 (18 Sep 2021 12:16), Max: 99.2 (17 Sep 2021 20:05)  HR: 83 (18 Sep 2021 12:16) (83 - 90)  BP: 121/74 (18 Sep 2021 12:16) (104/61 - 121/74)  BP(mean): --  RR: 17 (18 Sep 2021 12:16) (17 - 18)  SpO2: 94% (18 Sep 2021 12:16) (93% - 96%)    I&O's Summary    17 Sep 2021 07:01  -  18 Sep 2021 07:00  --------------------------------------------------------  IN: 1110 mL / OUT: 1600 mL / NET: -490 mL        PHYSICAL EXAM:    Constitutional: NAD, awake and alert, well-developed  Eyes:   Pupils round, no lesions  ENMT: no exudate or erythema  Pulmonary: breath sounds are clear bilaterally, No wheezing, rales or rhonchi  Cardiovascular: PMI  non-displaced Regular S1 and S2, no murmurs, rubs, gallops or clicks  Gastrointestinal: Bowel Sounds present, soft, nontender.   Lymph: No peripheral edema. No cervical lymphadenopathy.  Neurological: Alert, no focal deficits  Skin: No rashes. Changes of chronic venous stasis. No cyanosis. Right foot bandaged  Psych:  Mood & affect appropriate    LABS: All Labs Reviewed:                        8.9    4.05  )-----------( 109      ( 18 Sep 2021 09:00 )             27.4                         8.9    4.35  )-----------( 84       ( 17 Sep 2021 08:20 )             26.8                         8.6    4.47  )-----------( 68       ( 16 Sep 2021 08:00 )             26.1     18 Sep 2021 09:00    137    |  104    |  9      ----------------------------<  96     4.2     |  28     |  0.66   17 Sep 2021 08:20    137    |  105    |  11     ----------------------------<  110    4.0     |  25     |  0.60   16 Sep 2021 08:00    132    |  102    |  14     ----------------------------<  104    3.8     |  23     |  0.74     Ca    8.1        18 Sep 2021 09:00  Ca    7.9        17 Sep 2021 08:20  Ca    7.7        16 Sep 2021 08:00    TPro  6.3    /  Alb  2.5    /  TBili  0.7    /  DBili  x      /  AST  21     /  ALT  14     /  AlkPhos  119    18 Sep 2021 09:00    PT/INR - ( 18 Sep 2021 09:00 )   PT: 21.3 sec;   INR: 1.88 ratio         PTT - ( 18 Sep 2021 09:00 )  PTT:36.3 sec    < from: 12 Lead ECG (09.14.21 @ 20:29) >    Ventricular Rate 97 BPM    Atrial Rate 97 BPM    P-R Interval 130 ms    QRS Duration 122 ms    Q-T Interval 374 ms    QTC Calculation(Bazett) 474 ms    P Axis 43 degrees    R Axis 35 degrees    T Axis 173 degrees    Diagnosis Line Normal sinus rhythm  Left bundle branch block  Confirmed by JAI RAND ([id number]) on 9/15/2021 10:39:54 AM    < end of copied text >      < from: Xray Chest 1 View AP/PA (09.14.21 @ 20:53) >    EXAM:  XR FOOT COMP MIN 3 VIEWS RT                          EXAM:  XR ANKLE COMP MIN 3 VIEWS RT                          EXAM:  XR CHEST AP OR PA 1V                          EXAM:  XR TIB FIB AP LAT 2 VIEWS RT                            PROCEDURE DATE:  09/14/2021          INTERPRETATION:  Chest and right tib-fib, ankle, and foot. Patient has cellulitis of the right leg and wound care the foot. There is history of pancreatic cancer.    AP chest on September 14, 2021 at 8:51 PM.    Heart magnified by technique. Extensive is thoracolumbar hardware left-sided [id number] remain.    Mild right thoracic curve again noted.    Lungs remain clear.    Present study shows a right MediPort new since June 12, 2020.      Right tib-fib. AP and lateral views. Arterial calcifications. No knee effusion.    Moderate to advanced knee degeneration. No bone destruction or fracture.    Right ankle. 3 views. Arterial calcification and ankle edema. No bone destruction or fracture.    Right foot. 3 views.    Partial subluxation of the right first MTP joint with associated degeneration again noted.    Scattered slight IP degeneration again seen. No fracture or radiographic bone destruction.    Right foot is similar to August 4.    IMPRESSION: No acute chest finding. Ankle edema and degenerative changes in the foot again seen. Fairly advanced right knee degeneration also again noted.    --- End of Report ---            JODI GURROLA MD; Attending Radiologist  This document has been electronically signed. Sep 15 2021 11:45AM    < end of copied text >

## 2021-09-18 NOTE — PROGRESS NOTE ADULT - SUBJECTIVE AND OBJECTIVE BOX
Patient is a 80y old  Male who presents with a chief complaint of right hallux ulcer/cellulitis (17 Sep 2021 17:29)      Subjective:  INTERVAL HPI/OVERNIGHT EVENTS: Patient seen and examined at bedside. No overnight events occurred. Patient has no complaints at this time. Denies fevers, chills, headache, lightheadedness, chest pain, dyspnea, abdominal pain, n/v/d/c.    MEDICATIONS  (STANDING):  benzonatate 100 milliGRAM(s) Oral three times a day  ceFAZolin   IVPB 2000 milliGRAM(s) IV Intermittent every 8 hours  cholecalciferol 400 Unit(s) Oral daily  dorzolamide 2%/timolol 0.5% Ophthalmic Solution 1 Drop(s) Both EYES two times a day  DULoxetine 60 milliGRAM(s) Oral daily  fluticasone propionate 50 MICROgram(s)/spray Nasal Spray 1 Spray(s) Both Nostrils every 12 hours  furosemide    Tablet 20 milliGRAM(s) Oral daily  gabapentin 300 milliGRAM(s) Oral two times a day  ketorolac 0.5% Ophthalmic Solution 1 Drop(s) Both EYES daily  latanoprost 0.005% Ophthalmic Solution 1 Drop(s) Both EYES at bedtime  melatonin 5 milliGRAM(s) Oral at bedtime  multivitamin 1 Tablet(s) Oral daily  pancrelipase  (CREON 36,000 Lipase Units) 2 Capsule(s) Oral three times a day with meals  pantoprazole    Tablet 40 milliGRAM(s) Oral before breakfast  pyridoxine 100 milliGRAM(s) Oral daily  sacubitril 49 mG/valsartan 51 mG 1 Tablet(s) Oral two times a day  sotalol 40 milliGRAM(s) Oral two times a day  tamsulosin 0.4 milliGRAM(s) Oral at bedtime    MEDICATIONS  (PRN):  acetaminophen   Tablet .. 650 milliGRAM(s) Oral every 6 hours PRN Temp greater or equal to 38C (100.4F), Mild Pain (1 - 3)  ALPRAZolam 0.25 milliGRAM(s) Oral at bedtime PRN anxiety  guaiFENesin Oral Liquid (Sugar-Free) 200 milliGRAM(s) Oral every 6 hours PRN Cough  oxycodone    5 mG/acetaminophen 325 mG 1 Tablet(s) Oral every 6 hours PRN Moderate Pain (4 - 6)  prochlorperazine   Tablet 10 milliGRAM(s) Oral daily PRN nausea or vomiting      Allergies    No Known Allergies    Intolerances        REVIEW OF SYSTEMS:  CONSTITUTIONAL: No fever or chills  HEENT:  No headache, no sore throat  RESPIRATORY: No cough, wheezing, or shortness of breath  CARDIOVASCULAR: No chest pain, palpitations  GASTROINTESTINAL: No abd pain, nausea, vomiting, or diarrhea  GENITOURINARY: No dysuria, frequency, or hematuria  NEUROLOGICAL: no focal weakness or dizziness  MUSCULOSKELETAL: no myalgias     Objective:  Vital Signs Last 24 Hrs  T(C): 36.7 (18 Sep 2021 12:16), Max: 37.3 (17 Sep 2021 20:05)  T(F): 98.1 (18 Sep 2021 12:16), Max: 99.2 (17 Sep 2021 20:05)  HR: 83 (18 Sep 2021 12:16) (83 - 90)  BP: 121/74 (18 Sep 2021 12:16) (104/61 - 121/74)  RR: 17 (18 Sep 2021 12:16) (17 - 18)  SpO2: 94% (18 Sep 2021 12:16) (93% - 96%)    GENERAL: NAD, lying in bed comfortably  HEAD:  Atraumatic, Normocephalic  EYES: EOMI, PERRLA, conjunctiva and sclera clear  ENT: Moist mucous membranes  NECK: Supple, No JVD  CHEST/LUNG: Clear to auscultation bilaterally; No rales, rhonchi, wheezing, or rubs. Unlabored respirations  HEART: Regular rate and rhythm; No murmurs, rubs, or gallops  ABDOMEN: Bowel sounds present; Soft, Nontender, Nondistended. No hepatomegaly  EXTREMITIES:  2+ Radial Pulses/ 2+ L DP pulse, R LE wrapped in ACE dressing clean/dry/intact.  NERVOUS SYSTEM:  Alert & Oriented X3, speech clear. No deficits   SKIN: No rashes or lesions    LABS:                        8.9    4.05  )-----------( 109      ( 18 Sep 2021 09:00 )             27.4     CBC Full  -  ( 18 Sep 2021 09:00 )  WBC Count : 4.05 K/uL  Hemoglobin : 8.9 g/dL  Hematocrit : 27.4 %  Platelet Count - Automated : 109 K/uL  Mean Cell Volume : 104.2 fl  Mean Cell Hemoglobin : 33.8 pg  Mean Cell Hemoglobin Concentration : 32.5 gm/dL  Auto Neutrophil # : 2.91 K/uL  Auto Lymphocyte # : 0.45 K/uL  Auto Monocyte # : 0.62 K/uL  Auto Eosinophil # : 0.04 K/uL  Auto Basophil # : 0.01 K/uL  Auto Neutrophil % : 71.9 %  Auto Lymphocyte % : 11.1 %  Auto Monocyte % : 15.3 %  Auto Eosinophil % : 1.0 %  Auto Basophil % : 0.2 %    18 Sep 2021 09:00    137    |  104    |  9      ----------------------------<  96     4.2     |  28     |  0.66     Ca    8.1        18 Sep 2021 09:00    TPro  6.3    /  Alb  2.5    /  TBili  0.7    /  DBili  x      /  AST  21     /  ALT  14     /  AlkPhos  119    18 Sep 2021 09:00    PT/INR - ( 18 Sep 2021 09:00 )   PT: 21.3 sec;   INR: 1.88 ratio         PTT - ( 18 Sep 2021 09:00 )  PTT:36.3 sec    CAPILLARY BLOOD GLUCOSE        Culture - Blood (collected 09-16-21 @ 12:01)  Source: .Blood Blood-Peripheral  Preliminary Report (09-17-21 @ 13:02):    No growth to date.    Culture - Blood (collected 09-16-21 @ 12:01)  Source: .Blood Blood-Peripheral  Preliminary Report (09-17-21 @ 13:02):    No growth to date.    Culture - Tissue with Gram Stain (collected 09-16-21 @ 01:16)  Source: .Tissue right hallux bone culture  Gram Stain (09-16-21 @ 04:48):    Rare polymorphonuclear leukocytes seen per low power field    No organisms seen per oil power field  Preliminary Report (09-17-21 @ 09:03):    Few Staphylococcus aureus  Organism: Staphylococcus aureus (09-18-21 @ 13:19)  Organism: Staphylococcus aureus (09-18-21 @ 13:19)      -  Ampicillin/Sulbactam: S <=8/4      -  Cefazolin: S <=4      -  Clindamycin: S <=0.25      -  Erythromycin: S <=0.25      -  Gentamicin: S 2 Should not be used as monotherapy      -  Oxacillin: S <=0.25      -  RIF- Rifampin: S <=1 Should not be used as monotherapy      -  Tetra/Doxy: S <=1      -  Trimethoprim/Sulfamethoxazole: S <=0.5/9.5      -  Vancomycin: S 2      Method Type: TYSON    Culture - Urine (collected 09-15-21 @ 04:15)  Source: Clean Catch Clean Catch (Midstream)  Final Report (09-16-21 @ 00:06):    <10,000 CFU/mL Normal Urogenital Juani    Culture - Blood (collected 09-15-21 @ 01:20)  Source: .Blood Blood-Peripheral  Gram Stain (09-16-21 @ 01:09):    Growth in aerobic bottle: Gram Positive Cocci in Clusters    Growth in anaerobic bottle: Gram Positive Cocci in Clusters  Final Report (09-17-21 @ 16:59):    Growth in aerobic and anaerobic bottles: Staphylococcus aureus    ***Blood Panel PCR results on this specimen are available    approximately 3 hours after the Gram stain result.***    Gram stain, PCR, and/or culture results may not always    correspond dueto difference in methodologies.    ************************************************************    This PCR assay was performed by multiplex PCR. This    Assay tests for 66 bacterial and resistance gene targets.    Please refer to the [facility] test directory    at [email or web address] for details.  Organism: Blood Culture PCR  Staphylococcus aureus (09-17-21 @ 16:59)  Organism: Staphylococcus aureus (09-17-21 @ 16:59)      -  Ampicillin/Sulbactam: S <=8/4      -  Cefazolin: S <=4      -  Clindamycin: R <=0.25 This isolate is presumed to be clindamycin resistant based on detection of inducible resistance. Clindamycin may still be effective in some patients.      -  Erythromycin: I 1      -  Gentamicin: S <=1 Should not be used as monotherapy      -  Oxacillin: S 0.5      -  RIF- Rifampin: S <=1 Should not be used as monotherapy      -  Tetra/Doxy: S <=1      -  Trimethoprim/Sulfamethoxazole: S <=0.5/9.5      -  Vancomycin: S 2      Method Type: TYSON  Organism: Blood Culture PCR (09-17-21 @ 16:59)      -  Staphylococcus aureus: Detec Any isolate of Staphylococcus aureus from a blood culture is NOT considered a contaminant.      Method Type: PCR    Culture - Blood (collected 09-15-21 @ 01:20)  Source: .Blood Blood-Peripheral  Gram Stain (09-15-21 @ 22:34):    Growth in aerobic bottle: Gram Positive Cocci in Clusters  Final Report (09-17-21 @ 17:41):    Growth in aerobic bottle: Staphylococcus aureus    See previous culture [id number]        RADIOLOGY & ADDITIONAL TESTS:    Personally reviewed.     Consultant(s) Notes Reviewed:  [x] YES  [ ] NO     Patient is a 80y old  Male who presents with a chief complaint of right hallux ulcer/cellulitis.      Subjective:  INTERVAL HPI/OVERNIGHT EVENTS: Patient seen and examined at bedside. No acute overnight events occurred. Denies fevers, chills, headache, lightheadedness, chest pain, dyspnea, abdominal pain, n/v/d/c.    MEDICATIONS  (STANDING):  benzonatate 100 milliGRAM(s) Oral three times a day  ceFAZolin   IVPB 2000 milliGRAM(s) IV Intermittent every 8 hours  cholecalciferol 400 Unit(s) Oral daily  dorzolamide 2%/timolol 0.5% Ophthalmic Solution 1 Drop(s) Both EYES two times a day  DULoxetine 60 milliGRAM(s) Oral daily  fluticasone propionate 50 MICROgram(s)/spray Nasal Spray 1 Spray(s) Both Nostrils every 12 hours  furosemide    Tablet 20 milliGRAM(s) Oral daily  gabapentin 300 milliGRAM(s) Oral two times a day  ketorolac 0.5% Ophthalmic Solution 1 Drop(s) Both EYES daily  latanoprost 0.005% Ophthalmic Solution 1 Drop(s) Both EYES at bedtime  melatonin 5 milliGRAM(s) Oral at bedtime  multivitamin 1 Tablet(s) Oral daily  pancrelipase  (CREON 36,000 Lipase Units) 2 Capsule(s) Oral three times a day with meals  pantoprazole    Tablet 40 milliGRAM(s) Oral before breakfast  pyridoxine 100 milliGRAM(s) Oral daily  sacubitril 49 mG/valsartan 51 mG 1 Tablet(s) Oral two times a day  sotalol 40 milliGRAM(s) Oral two times a day  tamsulosin 0.4 milliGRAM(s) Oral at bedtime    MEDICATIONS  (PRN):  acetaminophen   Tablet .. 650 milliGRAM(s) Oral every 6 hours PRN Temp greater or equal to 38C (100.4F), Mild Pain (1 - 3)  ALPRAZolam 0.25 milliGRAM(s) Oral at bedtime PRN anxiety  guaiFENesin Oral Liquid (Sugar-Free) 200 milliGRAM(s) Oral every 6 hours PRN Cough  oxycodone    5 mG/acetaminophen 325 mG 1 Tablet(s) Oral every 6 hours PRN Moderate Pain (4 - 6)  prochlorperazine   Tablet 10 milliGRAM(s) Oral daily PRN nausea or vomiting      Allergies    No Known Allergies    Intolerances        REVIEW OF SYSTEMS:  CONSTITUTIONAL: No fever or chills  HEENT:  No headache, no sore throat  RESPIRATORY: No cough, wheezing, or shortness of breath  CARDIOVASCULAR: No chest pain, palpitations  GASTROINTESTINAL: No abd pain, nausea, vomiting, or diarrhea  GENITOURINARY: No dysuria, frequency, or hematuria  NEUROLOGICAL: no focal weakness or dizziness  MUSCULOSKELETAL: no myalgias     Objective:  Vital Signs Last 24 Hrs  T(C): 36.7 (18 Sep 2021 12:16), Max: 37.3 (17 Sep 2021 20:05)  T(F): 98.1 (18 Sep 2021 12:16), Max: 99.2 (17 Sep 2021 20:05)  HR: 83 (18 Sep 2021 12:16) (83 - 90)  BP: 121/74 (18 Sep 2021 12:16) (104/61 - 121/74)  RR: 17 (18 Sep 2021 12:16) (17 - 18)  SpO2: 94% (18 Sep 2021 12:16) (93% - 96%)    Physical Exam:  GENERAL: NAD, lying in bed comfortably  HEENT: Moist mucous membranes, anicteric  CHEST/LUNG: Clear to auscultation bilaterally; No rales, rhonchi, wheezing  HEART: Regular rate and rhythm, S1, S2  ABDOMEN: Bowel sounds present; Soft, Nontender, Nondistended.   EXTREMITIES:  2+ Peripheral Pulses, brisk capillary refill. No clubbing, cyanosis, or edema  NERVOUS SYSTEM:  Alert & Oriented X3, speech clear. No deficits   MSK: R foot wrapped in ace bandage with gauze over hallux. Dressing C/D/I. Calves soft, nontender ; old hematoma on left forearm    LABS:                        8.9    4.05  )-----------( 109      ( 18 Sep 2021 09:00 )             27.4     CBC Full  -  ( 18 Sep 2021 09:00 )  WBC Count : 4.05 K/uL  Hemoglobin : 8.9 g/dL  Hematocrit : 27.4 %  Platelet Count - Automated : 109 K/uL  Mean Cell Volume : 104.2 fl  Mean Cell Hemoglobin : 33.8 pg  Mean Cell Hemoglobin Concentration : 32.5 gm/dL  Auto Neutrophil # : 2.91 K/uL  Auto Lymphocyte # : 0.45 K/uL  Auto Monocyte # : 0.62 K/uL  Auto Eosinophil # : 0.04 K/uL  Auto Basophil # : 0.01 K/uL  Auto Neutrophil % : 71.9 %  Auto Lymphocyte % : 11.1 %  Auto Monocyte % : 15.3 %  Auto Eosinophil % : 1.0 %  Auto Basophil % : 0.2 %    18 Sep 2021 09:00    137    |  104    |  9      ----------------------------<  96     4.2     |  28     |  0.66     Ca    8.1        18 Sep 2021 09:00    TPro  6.3    /  Alb  2.5    /  TBili  0.7    /  DBili  x      /  AST  21     /  ALT  14     /  AlkPhos  119    18 Sep 2021 09:00    PT/INR - ( 18 Sep 2021 09:00 )   PT: 21.3 sec;   INR: 1.88 ratio         PTT - ( 18 Sep 2021 09:00 )  PTT:36.3 sec    CAPILLARY BLOOD GLUCOSE        Culture - Blood (collected 09-16-21 @ 12:01)  Source: .Blood Blood-Peripheral  Preliminary Report (09-17-21 @ 13:02):    No growth to date.    Culture - Blood (collected 09-16-21 @ 12:01)  Source: .Blood Blood-Peripheral  Preliminary Report (09-17-21 @ 13:02):    No growth to date.    Culture - Tissue with Gram Stain (collected 09-16-21 @ 01:16)  Source: .Tissue right hallux bone culture  Gram Stain (09-16-21 @ 04:48):    Rare polymorphonuclear leukocytes seen per low power field    No organisms seen per oil power field  Preliminary Report (09-17-21 @ 09:03):    Few Staphylococcus aureus  Organism: Staphylococcus aureus (09-18-21 @ 13:19)  Organism: Staphylococcus aureus (09-18-21 @ 13:19)      -  Ampicillin/Sulbactam: S <=8/4      -  Cefazolin: S <=4      -  Clindamycin: S <=0.25      -  Erythromycin: S <=0.25      -  Gentamicin: S 2 Should not be used as monotherapy      -  Oxacillin: S <=0.25      -  RIF- Rifampin: S <=1 Should not be used as monotherapy      -  Tetra/Doxy: S <=1      -  Trimethoprim/Sulfamethoxazole: S <=0.5/9.5      -  Vancomycin: S 2      Method Type: TYSON    Culture - Urine (collected 09-15-21 @ 04:15)  Source: Clean Catch Clean Catch (Midstream)  Final Report (09-16-21 @ 00:06):    <10,000 CFU/mL Normal Urogenital Juani    Culture - Blood (collected 09-15-21 @ 01:20)  Source: .Blood Blood-Peripheral  Gram Stain (09-16-21 @ 01:09):    Growth in aerobic bottle: Gram Positive Cocci in Clusters    Growth in anaerobic bottle: Gram Positive Cocci in Clusters  Final Report (09-17-21 @ 16:59):    Growth in aerobic and anaerobic bottles: Staphylococcus aureus    ***Blood Panel PCR results on this specimen are available    approximately 3 hours after the Gram stain result.***    Gram stain, PCR, and/or culture results may not always    correspond dueto difference in methodologies.    ************************************************************    This PCR assay was performed by multiplex PCR. This    Assay tests for 66 bacterial and resistance gene targets.    Please refer to the [facility] test directory    at [email or web address] for details.  Organism: Blood Culture PCR  Staphylococcus aureus (09-17-21 @ 16:59)  Organism: Staphylococcus aureus (09-17-21 @ 16:59)      -  Ampicillin/Sulbactam: S <=8/4      -  Cefazolin: S <=4      -  Clindamycin: R <=0.25 This isolate is presumed to be clindamycin resistant based on detection of inducible resistance. Clindamycin may still be effective in some patients.      -  Erythromycin: I 1      -  Gentamicin: S <=1 Should not be used as monotherapy      -  Oxacillin: S 0.5      -  RIF- Rifampin: S <=1 Should not be used as monotherapy      -  Tetra/Doxy: S <=1      -  Trimethoprim/Sulfamethoxazole: S <=0.5/9.5      -  Vancomycin: S 2      Method Type: TYSON  Organism: Blood Culture PCR (09-17-21 @ 16:59)      -  Staphylococcus aureus: Detec Any isolate of Staphylococcus aureus from a blood culture is NOT considered a contaminant.      Method Type: PCR    Culture - Blood (collected 09-15-21 @ 01:20)  Source: .Blood Blood-Peripheral  Gram Stain (09-15-21 @ 22:34):    Growth in aerobic bottle: Gram Positive Cocci in Clusters  Final Report (09-17-21 @ 17:41):    Growth in aerobic bottle: Staphylococcus aureus    See previous culture [id number]        RADIOLOGY & ADDITIONAL TESTS:    Personally reviewed.     Consultant(s) Notes Reviewed:  [x] YES  [ ] NO

## 2021-09-18 NOTE — PROGRESS NOTE ADULT - TIME BILLING
Pt seen + examined. Case discussed with resident. Agree with assessment and plan above (edited by me in detail):  Time spent: 40min. More than 50% of the visit was spent counseling the patient and pt's daughter on medical condition -- acute OM of right hallux, MSSA bacteremia, podiatry plan for amputation, ?dysphagia.    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    - MSSA bacteremia on admission BCx; repeat BCx from 9/16 are NGTD  - Surgical pathology of right hallux: +acute Osteomyelitis, staph aureus   - Podiatry/wound care follow up  - ID recs appreciated - c/w cefazolin 2gm q8h  - Wound care as per Podiatry - plan for OR on Monday for R. hallux amputation   - Pain control  - Elevated leg to alleviate pain&swelling  - f/up TTE  - cardiology ([facility]) pre-op evaldevon appreciated    - this evening, pt called me to bedside and reported that he often has some coughing after eating and especially after drinking  - Placed pt on dysphagia 3 with nectar consistency liquids and ordered S&S eval  - pt does report feeling a bit better with a nebulizer treatment earlier and requested one -- gave pt 1x treatment with albuterol

## 2021-09-18 NOTE — PROGRESS NOTE ADULT - PROBLEM SELECTOR PLAN 2
please reference above
Pt follows Dr. Felipe/Rene for R hallux hammertoe dorsal stage 3 pressure injury  - Fall precautions  - Podiatry (Dr. López) consulted, f/u recs

## 2021-09-18 NOTE — CONSULT NOTE ADULT - ASSESSMENT
Patient is now admitted for right big toe amputation for osteomyelitis. History of pancreatic cancer undergoing therapy, aortic stenosis status post Kimberly, coronary artery disease status post intervention, ICD implantation without known details. He has no contraindications to his planned surgery    Recommend    Proceed with planned surgery using routine hemodynamic monitoring    Hold rivaroxaban for 2 days prior to procedure    Obtain most recent ICD interrogation from Dr. Perez when his office opens on Monday    Check final echocardiography report    Further management will be dependent on his clinical course we will follow him closely with you Patient is now admitted for right big toe amputation for osteomyelitis. History of pancreatic cancer undergoing therapy, aortic stenosis status post Kimberly, coronary artery disease status post intervention, ICD implantation without known details. He has no contraindications to his planned surgery    Recommend    Proceed with planned surgery using routine hemodynamic monitoring    Hold rivaroxaban for 2 days prior to procedure    Obtain most recent ICD interrogation from Dr. Perez when his office opens on Monday    Check final echocardiography report    cont sotalol    cont Entresto    Further management will be dependent on his clinical course we will follow him closely with you

## 2021-09-18 NOTE — PROGRESS NOTE ADULT - PROBLEM SELECTOR PLAN 1
- patient seen and evaluated  -Wound currently  dressed with Aquacel ag and DSD  - patient bone biopsy pathology results came back Acute Osteomyelitis  - Dr. López spoke with the patient about the diagnosis and the treatment options yesterday  -Patient is currently scheduled for Right 1st Partial Ray Resection with Dr. López on Monday 9/20/2021 as an add on   - Please provide medical and cardiac clearance for the patient   - Given the patient's severity of symptoms, will plan for surgical intervention at this time. Discussed risks, benefits, and potential complications with patient and family members regarding surgical intervention including sepsis, loss of limb, and loss of life. All questions answered to satisfaction at this time.  Requesting cardiology risk stratification and optimization.  Requesting medical risk stratification and optimization.  - podiatry team will continue to follow patient while in house

## 2021-09-18 NOTE — CONSULT NOTE ADULT - REASON FOR ADMISSION
right hallux ulcer/cellulitis

## 2021-09-19 ENCOUNTER — TRANSCRIPTION ENCOUNTER (OUTPATIENT)
Age: 81
End: 2021-09-19

## 2021-09-19 LAB
ALBUMIN SERPL ELPH-MCNC: 2.5 G/DL — LOW (ref 3.3–5)
ALP SERPL-CCNC: 115 U/L — SIGNIFICANT CHANGE UP (ref 40–120)
ALT FLD-CCNC: 17 U/L — SIGNIFICANT CHANGE UP (ref 12–78)
ANION GAP SERPL CALC-SCNC: 4 MMOL/L — LOW (ref 5–17)
APTT BLD: 101.7 SEC — HIGH (ref 27.5–35.5)
APTT BLD: 144 SEC — CRITICAL HIGH (ref 27.5–35.5)
APTT BLD: 86.9 SEC — HIGH (ref 27.5–35.5)
APTT BLD: 92 SEC — HIGH (ref 27.5–35.5)
AST SERPL-CCNC: 20 U/L — SIGNIFICANT CHANGE UP (ref 15–37)
BASOPHILS # BLD AUTO: 0.03 K/UL — SIGNIFICANT CHANGE UP (ref 0–0.2)
BASOPHILS NFR BLD AUTO: 0.7 % — SIGNIFICANT CHANGE UP (ref 0–2)
BILIRUB SERPL-MCNC: 0.5 MG/DL — SIGNIFICANT CHANGE UP (ref 0.2–1.2)
BUN SERPL-MCNC: 10 MG/DL — SIGNIFICANT CHANGE UP (ref 7–23)
CALCIUM SERPL-MCNC: 8.1 MG/DL — LOW (ref 8.5–10.1)
CHLORIDE SERPL-SCNC: 103 MMOL/L — SIGNIFICANT CHANGE UP (ref 96–108)
CO2 SERPL-SCNC: 29 MMOL/L — SIGNIFICANT CHANGE UP (ref 22–31)
CREAT SERPL-MCNC: 0.78 MG/DL — SIGNIFICANT CHANGE UP (ref 0.5–1.3)
EOSINOPHIL # BLD AUTO: 0.07 K/UL — SIGNIFICANT CHANGE UP (ref 0–0.5)
EOSINOPHIL NFR BLD AUTO: 1.7 % — SIGNIFICANT CHANGE UP (ref 0–6)
GLUCOSE SERPL-MCNC: 102 MG/DL — HIGH (ref 70–99)
HCT VFR BLD CALC: 26.8 % — LOW (ref 39–50)
HCT VFR BLD CALC: 27.8 % — LOW (ref 39–50)
HGB BLD-MCNC: 8.5 G/DL — LOW (ref 13–17)
HGB BLD-MCNC: 9 G/DL — LOW (ref 13–17)
IMM GRANULOCYTES NFR BLD AUTO: 0.7 % — SIGNIFICANT CHANGE UP (ref 0–1.5)
INR BLD: 1.41 RATIO — HIGH (ref 0.88–1.16)
LYMPHOCYTES # BLD AUTO: 0.56 K/UL — LOW (ref 1–3.3)
LYMPHOCYTES # BLD AUTO: 13.7 % — SIGNIFICANT CHANGE UP (ref 13–44)
MCHC RBC-ENTMCNC: 31.7 GM/DL — LOW (ref 32–36)
MCHC RBC-ENTMCNC: 32.4 GM/DL — SIGNIFICANT CHANGE UP (ref 32–36)
MCHC RBC-ENTMCNC: 33.1 PG — SIGNIFICANT CHANGE UP (ref 27–34)
MCHC RBC-ENTMCNC: 33.3 PG — SIGNIFICANT CHANGE UP (ref 27–34)
MCV RBC AUTO: 103 FL — HIGH (ref 80–100)
MCV RBC AUTO: 104.3 FL — HIGH (ref 80–100)
MONOCYTES # BLD AUTO: 0.72 K/UL — SIGNIFICANT CHANGE UP (ref 0–0.9)
MONOCYTES NFR BLD AUTO: 17.6 % — HIGH (ref 2–14)
NEUTROPHILS # BLD AUTO: 2.69 K/UL — SIGNIFICANT CHANGE UP (ref 1.8–7.4)
NEUTROPHILS NFR BLD AUTO: 65.6 % — SIGNIFICANT CHANGE UP (ref 43–77)
NRBC # BLD: 0 /100 WBCS — SIGNIFICANT CHANGE UP (ref 0–0)
NRBC # BLD: 0 /100 WBCS — SIGNIFICANT CHANGE UP (ref 0–0)
PLATELET # BLD AUTO: 107 K/UL — LOW (ref 150–400)
PLATELET # BLD AUTO: 124 K/UL — LOW (ref 150–400)
POTASSIUM SERPL-MCNC: 4.3 MMOL/L — SIGNIFICANT CHANGE UP (ref 3.5–5.3)
POTASSIUM SERPL-SCNC: 4.3 MMOL/L — SIGNIFICANT CHANGE UP (ref 3.5–5.3)
PROT SERPL-MCNC: 6.4 G/DL — SIGNIFICANT CHANGE UP (ref 6–8.3)
PROTHROM AB SERPL-ACNC: 16.2 SEC — HIGH (ref 10.6–13.6)
RBC # BLD: 2.57 M/UL — LOW (ref 4.2–5.8)
RBC # BLD: 2.7 M/UL — LOW (ref 4.2–5.8)
RBC # FLD: 15.6 % — HIGH (ref 10.3–14.5)
RBC # FLD: 15.7 % — HIGH (ref 10.3–14.5)
SODIUM SERPL-SCNC: 136 MMOL/L — SIGNIFICANT CHANGE UP (ref 135–145)
WBC # BLD: 4.1 K/UL — SIGNIFICANT CHANGE UP (ref 3.8–10.5)
WBC # BLD: 4.66 K/UL — SIGNIFICANT CHANGE UP (ref 3.8–10.5)
WBC # FLD AUTO: 4.1 K/UL — SIGNIFICANT CHANGE UP (ref 3.8–10.5)
WBC # FLD AUTO: 4.66 K/UL — SIGNIFICANT CHANGE UP (ref 3.8–10.5)

## 2021-09-19 PROCEDURE — 99233 SBSQ HOSP IP/OBS HIGH 50: CPT | Mod: GC

## 2021-09-19 PROCEDURE — 99233 SBSQ HOSP IP/OBS HIGH 50: CPT

## 2021-09-19 RX ORDER — OXYCODONE AND ACETAMINOPHEN 5; 325 MG/1; MG/1
1 TABLET ORAL ONCE
Refills: 0 | Status: DISCONTINUED | OUTPATIENT
Start: 2021-09-19 | End: 2021-09-19

## 2021-09-19 RX ORDER — ALPRAZOLAM 0.25 MG
0.25 TABLET ORAL ONCE
Refills: 0 | Status: DISCONTINUED | OUTPATIENT
Start: 2021-09-19 | End: 2021-09-19

## 2021-09-19 RX ADMIN — Medication 100 MILLIGRAM(S): at 12:20

## 2021-09-19 RX ADMIN — Medication 100 MILLIGRAM(S): at 13:23

## 2021-09-19 RX ADMIN — Medication 1 TABLET(S): at 12:20

## 2021-09-19 RX ADMIN — SACUBITRIL AND VALSARTAN 1 TABLET(S): 24; 26 TABLET, FILM COATED ORAL at 17:28

## 2021-09-19 RX ADMIN — DORZOLAMIDE HYDROCHLORIDE TIMOLOL MALEATE 1 DROP(S): 20; 5 SOLUTION/ DROPS OPHTHALMIC at 17:27

## 2021-09-19 RX ADMIN — PANTOPRAZOLE SODIUM 40 MILLIGRAM(S): 20 TABLET, DELAYED RELEASE ORAL at 05:29

## 2021-09-19 RX ADMIN — LATANOPROST 1 DROP(S): 0.05 SOLUTION/ DROPS OPHTHALMIC; TOPICAL at 21:56

## 2021-09-19 RX ADMIN — Medication 40 MILLIGRAM(S): at 17:28

## 2021-09-19 RX ADMIN — Medication 1 SPRAY(S): at 17:27

## 2021-09-19 RX ADMIN — GABAPENTIN 300 MILLIGRAM(S): 400 CAPSULE ORAL at 05:34

## 2021-09-19 RX ADMIN — HEPARIN SODIUM 1500 UNIT(S)/HR: 5000 INJECTION INTRAVENOUS; SUBCUTANEOUS at 10:09

## 2021-09-19 RX ADMIN — Medication 1 DROP(S): at 12:20

## 2021-09-19 RX ADMIN — Medication 0.25 MILLIGRAM(S): at 00:45

## 2021-09-19 RX ADMIN — GABAPENTIN 300 MILLIGRAM(S): 400 CAPSULE ORAL at 17:26

## 2021-09-19 RX ADMIN — Medication 5 MILLIGRAM(S): at 21:56

## 2021-09-19 RX ADMIN — HEPARIN SODIUM 1300 UNIT(S)/HR: 5000 INJECTION INTRAVENOUS; SUBCUTANEOUS at 16:47

## 2021-09-19 RX ADMIN — OXYCODONE AND ACETAMINOPHEN 1 TABLET(S): 5; 325 TABLET ORAL at 12:21

## 2021-09-19 RX ADMIN — Medication 20 MILLIGRAM(S): at 05:29

## 2021-09-19 RX ADMIN — HEPARIN SODIUM 0 UNIT(S)/HR: 5000 INJECTION INTRAVENOUS; SUBCUTANEOUS at 08:56

## 2021-09-19 RX ADMIN — Medication 400 UNIT(S): at 12:20

## 2021-09-19 RX ADMIN — OXYCODONE AND ACETAMINOPHEN 1 TABLET(S): 5; 325 TABLET ORAL at 21:55

## 2021-09-19 RX ADMIN — Medication 2 CAPSULE(S): at 08:43

## 2021-09-19 RX ADMIN — HEPARIN SODIUM 1300 UNIT(S)/HR: 5000 INJECTION INTRAVENOUS; SUBCUTANEOUS at 22:56

## 2021-09-19 RX ADMIN — Medication 100 MILLIGRAM(S): at 13:24

## 2021-09-19 RX ADMIN — Medication 100 MILLIGRAM(S): at 05:28

## 2021-09-19 RX ADMIN — Medication 100 MILLIGRAM(S): at 21:57

## 2021-09-19 RX ADMIN — OXYCODONE AND ACETAMINOPHEN 1 TABLET(S): 5; 325 TABLET ORAL at 13:20

## 2021-09-19 RX ADMIN — TAMSULOSIN HYDROCHLORIDE 0.4 MILLIGRAM(S): 0.4 CAPSULE ORAL at 21:56

## 2021-09-19 RX ADMIN — TAMSULOSIN HYDROCHLORIDE 0.4 MILLIGRAM(S): 0.4 CAPSULE ORAL at 00:45

## 2021-09-19 RX ADMIN — Medication 100 MILLIGRAM(S): at 21:53

## 2021-09-19 RX ADMIN — Medication 0.25 MILLIGRAM(S): at 23:22

## 2021-09-19 RX ADMIN — Medication 1 SPRAY(S): at 05:37

## 2021-09-19 RX ADMIN — HEPARIN SODIUM 1800 UNIT(S)/HR: 5000 INJECTION INTRAVENOUS; SUBCUTANEOUS at 01:35

## 2021-09-19 RX ADMIN — Medication 2 CAPSULE(S): at 17:27

## 2021-09-19 RX ADMIN — DULOXETINE HYDROCHLORIDE 60 MILLIGRAM(S): 30 CAPSULE, DELAYED RELEASE ORAL at 12:20

## 2021-09-19 RX ADMIN — OXYCODONE AND ACETAMINOPHEN 1 TABLET(S): 5; 325 TABLET ORAL at 23:22

## 2021-09-19 RX ADMIN — DORZOLAMIDE HYDROCHLORIDE TIMOLOL MALEATE 1 DROP(S): 20; 5 SOLUTION/ DROPS OPHTHALMIC at 05:29

## 2021-09-19 RX ADMIN — Medication 2 CAPSULE(S): at 12:20

## 2021-09-19 NOTE — PROGRESS NOTE ADULT - SUBJECTIVE AND OBJECTIVE BOX
Patient is a 80y old  Male who presents with a chief complaint of right hallux ulcer/cellulitis (19 Sep 2021 10:45)      INTERVAL HPI/OVERNIGHT EVENTS: Patient seen and examined at bedside. No overnight events occurred. States coughing/choking has improved since his diet was changed to dysphagia. Denies fevers, chills, headache, lightheadedness, chest pain, dyspnea, abdominal pain, n/v/d/c.    MEDICATIONS  (STANDING):  benzonatate 100 milliGRAM(s) Oral three times a day  ceFAZolin   IVPB 2000 milliGRAM(s) IV Intermittent every 8 hours  cholecalciferol 400 Unit(s) Oral daily  dorzolamide 2%/timolol 0.5% Ophthalmic Solution 1 Drop(s) Both EYES two times a day  DULoxetine 60 milliGRAM(s) Oral daily  fluticasone propionate 50 MICROgram(s)/spray Nasal Spray 1 Spray(s) Both Nostrils every 12 hours  furosemide    Tablet 20 milliGRAM(s) Oral daily  gabapentin 300 milliGRAM(s) Oral two times a day  heparin  Infusion.  Unit(s)/Hr (18 mL/Hr) IV Continuous <Continuous>  ketorolac 0.5% Ophthalmic Solution 1 Drop(s) Both EYES daily  latanoprost 0.005% Ophthalmic Solution 1 Drop(s) Both EYES at bedtime  melatonin 5 milliGRAM(s) Oral at bedtime  multivitamin 1 Tablet(s) Oral daily  pancrelipase  (CREON 36,000 Lipase Units) 2 Capsule(s) Oral three times a day with meals  pantoprazole    Tablet 40 milliGRAM(s) Oral before breakfast  pyridoxine 100 milliGRAM(s) Oral daily  sacubitril 49 mG/valsartan 51 mG 1 Tablet(s) Oral two times a day  sotalol 40 milliGRAM(s) Oral two times a day  tamsulosin 0.4 milliGRAM(s) Oral at bedtime    MEDICATIONS  (PRN):  acetaminophen   Tablet .. 650 milliGRAM(s) Oral every 6 hours PRN Temp greater or equal to 38C (100.4F), Mild Pain (1 - 3)  ALPRAZolam 0.25 milliGRAM(s) Oral at bedtime PRN anxiety  guaiFENesin Oral Liquid (Sugar-Free) 200 milliGRAM(s) Oral every 6 hours PRN Cough  heparin   Injectable 8000 Unit(s) IV Push every 6 hours PRN For aPTT less than 40  heparin   Injectable 4000 Unit(s) IV Push every 6 hours PRN For aPTT between 40 - 57  oxycodone    5 mG/acetaminophen 325 mG 1 Tablet(s) Oral every 6 hours PRN Moderate Pain (4 - 6)  prochlorperazine   Tablet 10 milliGRAM(s) Oral daily PRN nausea or vomiting      Allergies    No Known Allergies    Intolerances        REVIEW OF SYSTEMS:  CONSTITUTIONAL: No fever or chills  HEENT:  No headache, no sore throat  RESPIRATORY: No cough, wheezing, or shortness of breath  CARDIOVASCULAR: No chest pain, palpitations  GASTROINTESTINAL: No abd pain, nausea, vomiting, or diarrhea  GENITOURINARY: No dysuria, frequency, or hematuria  NEUROLOGICAL: no focal weakness or dizziness  MUSCULOSKELETAL: no myalgias       Vital Signs Last 24 Hrs  T(C): 36.4 (19 Sep 2021 12:24), Max: 37.1 (18 Sep 2021 21:34)  T(F): 97.5 (19 Sep 2021 12:24), Max: 98.7 (18 Sep 2021 21:34)  HR: 99 (19 Sep 2021 12:24) (91 - 106)  BP: 121/73 (19 Sep 2021 12:24) (102/63 - 121/73)  BP(mean): --  RR: 18 (19 Sep 2021 12:24) (17 - 18)  SpO2: 97% (19 Sep 2021 12:24) (93% - 97%)    PHYSICAL EXAM:  GENERAL: NAD  HEENT:  anicteric, moist mucous membranes  CHEST/LUNG:  CTA b/l, no rales, wheezes, or rhonchi  HEART:  RRR, S1, S2  ABDOMEN:  BS+, soft, nontender, nondistended  EXTREMITIES: no edema, cyanosis, or calf tenderness  NERVOUS SYSTEM: answers questions and follows commands appropriately  MSK: R foot wrapped in ace bandage with gauze over hallux. Dressing C/D/I. Calves soft, nontender; hematoma on left forearm, chronic per patient    LABS:                        9.0    4.10  )-----------( 124      ( 19 Sep 2021 07:37 )             27.8     CBC Full  -  ( 19 Sep 2021 07:37 )  WBC Count : 4.10 K/uL  Hemoglobin : 9.0 g/dL  Hematocrit : 27.8 %  Platelet Count - Automated : 124 K/uL  Mean Cell Volume : 103.0 fl  Mean Cell Hemoglobin : 33.3 pg  Mean Cell Hemoglobin Concentration : 32.4 gm/dL  Auto Neutrophil # : 2.69 K/uL  Auto Lymphocyte # : 0.56 K/uL  Auto Monocyte # : 0.72 K/uL  Auto Eosinophil # : 0.07 K/uL  Auto Basophil # : 0.03 K/uL  Auto Neutrophil % : 65.6 %  Auto Lymphocyte % : 13.7 %  Auto Monocyte % : 17.6 %  Auto Eosinophil % : 1.7 %  Auto Basophil % : 0.7 %    19 Sep 2021 07:37    136    |  103    |  10     ----------------------------<  102    4.3     |  29     |  0.78     Ca    8.1        19 Sep 2021 07:37    TPro  6.4    /  Alb  2.5    /  TBili  0.5    /  DBili  x      /  AST  20     /  ALT  17     /  AlkPhos  115    19 Sep 2021 07:37    PT/INR - ( 19 Sep 2021 07:37 )   PT: 16.2 sec;   INR: 1.41 ratio         PTT - ( 19 Sep 2021 07:37 )  PTT:144.0 sec    CAPILLARY BLOOD GLUCOSE            Culture - Blood (collected 09-16-21 @ 12:01)  Source: .Blood Blood-Peripheral  Preliminary Report (09-17-21 @ 13:02):    No growth to date.    Culture - Blood (collected 09-16-21 @ 12:01)  Source: .Blood Blood-Peripheral  Preliminary Report (09-17-21 @ 13:02):    No growth to date.    Culture - Tissue with Gram Stain (collected 09-16-21 @ 01:16)  Source: .Tissue right hallux bone culture  Gram Stain (09-16-21 @ 04:48):    Rare polymorphonuclear leukocytes seen per low power field    No organisms seen per oil power field  Preliminary Report (09-17-21 @ 09:03):    Few Staphylococcus aureus  Organism: Staphylococcus aureus (09-18-21 @ 13:19)  Organism: Staphylococcus aureus (09-18-21 @ 13:19)      -  Ampicillin/Sulbactam: S <=8/4      -  Cefazolin: S <=4      -  Clindamycin: S <=0.25      -  Erythromycin: S <=0.25      -  Gentamicin: S 2 Should not be used as monotherapy      -  Oxacillin: S <=0.25      -  RIF- Rifampin: S <=1 Should not be used as monotherapy      -  Tetra/Doxy: S <=1      -  Trimethoprim/Sulfamethoxazole: S <=0.5/9.5      -  Vancomycin: S 2      Method Type: TYSON    Culture - Urine (collected 09-15-21 @ 04:15)  Source: Clean Catch Clean Catch (Midstream)  Final Report (09-16-21 @ 00:06):    <10,000 CFU/mL Normal Urogenital Juani    Culture - Blood (collected 09-15-21 @ 01:20)  Source: .Blood Blood-Peripheral  Gram Stain (09-16-21 @ 01:09):    Growth in aerobic bottle: Gram Positive Cocci in Clusters    Growth in anaerobic bottle: Gram Positive Cocci in Clusters  Final Report (09-17-21 @ 16:59):    Growth in aerobic and anaerobic bottles: Staphylococcus aureus    ***Blood Panel PCR results on this specimen are available    approximately 3 hours after the Gram stain result.***    Gram stain, PCR, and/or culture results may not always    correspond dueto difference in methodologies.    ************************************************************    This PCR assay was performed by multiplex PCR. This    Assay tests for 66 bacterial and resistance gene targets.    Please refer to the [facility] test directory    at [email or web address] for details.  Organism: Blood Culture PCR  Staphylococcus aureus (09-17-21 @ 16:59)  Organism: Staphylococcus aureus (09-17-21 @ 16:59)      -  Ampicillin/Sulbactam: S <=8/4      -  Cefazolin: S <=4      -  Clindamycin: R <=0.25 This isolate is presumed to be clindamycin resistant based on detection of inducible resistance. Clindamycin may still be effective in some patients.      -  Erythromycin: I 1      -  Gentamicin: S <=1 Should not be used as monotherapy      -  Oxacillin: S 0.5      -  RIF- Rifampin: S <=1 Should not be used as monotherapy      -  Tetra/Doxy: S <=1      -  Trimethoprim/Sulfamethoxazole: S <=0.5/9.5      -  Vancomycin: S 2      Method Type: TYSON  Organism: Blood Culture PCR (09-17-21 @ 16:59)      -  Staphylococcus aureus: Detec Any isolate of Staphylococcus aureus from a blood culture is NOT considered a contaminant.      Method Type: PCR    Culture - Blood (collected 09-15-21 @ 01:20)  Source: .Blood Blood-Peripheral  Gram Stain (09-15-21 @ 22:34):    Growth in aerobic bottle: Gram Positive Cocci in Clusters  Final Report (09-17-21 @ 17:41):    Growth in aerobic bottle: Staphylococcus aureus    See previous culture [id number]        RADIOLOGY & ADDITIONAL TESTS:    Personally reviewed.     Consultant(s) Notes Reviewed:  [x] YES  [ ] NO     Patient is a 80y old  Male who presents with a chief complaint of right hallux ulcer/cellulitis.      INTERVAL HPI/OVERNIGHT EVENTS: Patient seen and examined at bedside. No acute overnight events occurred. Pt states coughing with eating has improved significantly on his dysphagia diet. Pt admits some right foot pain, which is controlled. Denies fevers, chills, headache, lightheadedness, chest pain, dyspnea, abdominal pain, n/v/d/c.    MEDICATIONS  (STANDING):  benzonatate 100 milliGRAM(s) Oral three times a day  ceFAZolin   IVPB 2000 milliGRAM(s) IV Intermittent every 8 hours  cholecalciferol 400 Unit(s) Oral daily  dorzolamide 2%/timolol 0.5% Ophthalmic Solution 1 Drop(s) Both EYES two times a day  DULoxetine 60 milliGRAM(s) Oral daily  fluticasone propionate 50 MICROgram(s)/spray Nasal Spray 1 Spray(s) Both Nostrils every 12 hours  furosemide    Tablet 20 milliGRAM(s) Oral daily  gabapentin 300 milliGRAM(s) Oral two times a day  heparin  Infusion.  Unit(s)/Hr (18 mL/Hr) IV Continuous <Continuous>  ketorolac 0.5% Ophthalmic Solution 1 Drop(s) Both EYES daily  latanoprost 0.005% Ophthalmic Solution 1 Drop(s) Both EYES at bedtime  melatonin 5 milliGRAM(s) Oral at bedtime  multivitamin 1 Tablet(s) Oral daily  pancrelipase  (CREON 36,000 Lipase Units) 2 Capsule(s) Oral three times a day with meals  pantoprazole    Tablet 40 milliGRAM(s) Oral before breakfast  pyridoxine 100 milliGRAM(s) Oral daily  sacubitril 49 mG/valsartan 51 mG 1 Tablet(s) Oral two times a day  sotalol 40 milliGRAM(s) Oral two times a day  tamsulosin 0.4 milliGRAM(s) Oral at bedtime    MEDICATIONS  (PRN):  acetaminophen   Tablet .. 650 milliGRAM(s) Oral every 6 hours PRN Temp greater or equal to 38C (100.4F), Mild Pain (1 - 3)  ALPRAZolam 0.25 milliGRAM(s) Oral at bedtime PRN anxiety  guaiFENesin Oral Liquid (Sugar-Free) 200 milliGRAM(s) Oral every 6 hours PRN Cough  heparin   Injectable 8000 Unit(s) IV Push every 6 hours PRN For aPTT less than 40  heparin   Injectable 4000 Unit(s) IV Push every 6 hours PRN For aPTT between 40 - 57  oxycodone    5 mG/acetaminophen 325 mG 1 Tablet(s) Oral every 6 hours PRN Moderate Pain (4 - 6)  prochlorperazine   Tablet 10 milliGRAM(s) Oral daily PRN nausea or vomiting      Allergies    No Known Allergies    Intolerances        REVIEW OF SYSTEMS:  CONSTITUTIONAL: No fever or chills  HEENT:  No headache, no sore throat  RESPIRATORY: cough with swallowing thin liquids; denies wheezing or shortness of breath  CARDIOVASCULAR: No chest pain, palpitations  GASTROINTESTINAL: No abd pain, nausea, vomiting, or diarrhea  GENITOURINARY: No dysuria, frequency, or hematuria  NEUROLOGICAL: no focal weakness or dizziness  MUSCULOSKELETAL: +right foot pain (controlled) ; no myalgias       Vital Signs Last 24 Hrs  T(C): 36.4 (19 Sep 2021 12:24), Max: 37.1 (18 Sep 2021 21:34)  T(F): 97.5 (19 Sep 2021 12:24), Max: 98.7 (18 Sep 2021 21:34)  HR: 99 (19 Sep 2021 12:24) (91 - 106)  BP: 121/73 (19 Sep 2021 12:24) (102/63 - 121/73)  BP(mean): --  RR: 18 (19 Sep 2021 12:24) (17 - 18)  SpO2: 97% (19 Sep 2021 12:24) (93% - 97%)    PHYSICAL EXAM:  GENERAL: NAD  HEENT:  anicteric, moist mucous membranes  CHEST/LUNG:  CTA b/l, no rales, wheezes, or rhonchi  HEART:  RRR, S1, S2  ABDOMEN:  BS+, soft overall but firm in epigastric region, nontender, nondistended  EXTREMITIES: no cyanosis or calf tenderness  NERVOUS SYSTEM: answers questions and follows commands appropriately  MSK: R foot wrapped in ace bandage with gauze over hallux. Dressing C/D/I. Calves soft, nontender; hematoma on left forearm, chronic per patient    LABS:                        9.0    4.10  )-----------( 124      ( 19 Sep 2021 07:37 )             27.8     CBC Full  -  ( 19 Sep 2021 07:37 )  WBC Count : 4.10 K/uL  Hemoglobin : 9.0 g/dL  Hematocrit : 27.8 %  Platelet Count - Automated : 124 K/uL  Mean Cell Volume : 103.0 fl  Mean Cell Hemoglobin : 33.3 pg  Mean Cell Hemoglobin Concentration : 32.4 gm/dL  Auto Neutrophil # : 2.69 K/uL  Auto Lymphocyte # : 0.56 K/uL  Auto Monocyte # : 0.72 K/uL  Auto Eosinophil # : 0.07 K/uL  Auto Basophil # : 0.03 K/uL  Auto Neutrophil % : 65.6 %  Auto Lymphocyte % : 13.7 %  Auto Monocyte % : 17.6 %  Auto Eosinophil % : 1.7 %  Auto Basophil % : 0.7 %    19 Sep 2021 07:37    136    |  103    |  10     ----------------------------<  102    4.3     |  29     |  0.78     Ca    8.1        19 Sep 2021 07:37    TPro  6.4    /  Alb  2.5    /  TBili  0.5    /  DBili  x      /  AST  20     /  ALT  17     /  AlkPhos  115    19 Sep 2021 07:37    PT/INR - ( 19 Sep 2021 07:37 )   PT: 16.2 sec;   INR: 1.41 ratio         PTT - ( 19 Sep 2021 07:37 )  PTT:144.0 sec    CAPILLARY BLOOD GLUCOSE            Culture - Blood (collected 09-16-21 @ 12:01)  Source: .Blood Blood-Peripheral  Preliminary Report (09-17-21 @ 13:02):    No growth to date.    Culture - Blood (collected 09-16-21 @ 12:01)  Source: .Blood Blood-Peripheral  Preliminary Report (09-17-21 @ 13:02):    No growth to date.    Culture - Tissue with Gram Stain (collected 09-16-21 @ 01:16)  Source: .Tissue right hallux bone culture  Gram Stain (09-16-21 @ 04:48):    Rare polymorphonuclear leukocytes seen per low power field    No organisms seen per oil power field  Preliminary Report (09-17-21 @ 09:03):    Few Staphylococcus aureus  Organism: Staphylococcus aureus (09-18-21 @ 13:19)  Organism: Staphylococcus aureus (09-18-21 @ 13:19)      -  Ampicillin/Sulbactam: S <=8/4      -  Cefazolin: S <=4      -  Clindamycin: S <=0.25      -  Erythromycin: S <=0.25      -  Gentamicin: S 2 Should not be used as monotherapy      -  Oxacillin: S <=0.25      -  RIF- Rifampin: S <=1 Should not be used as monotherapy      -  Tetra/Doxy: S <=1      -  Trimethoprim/Sulfamethoxazole: S <=0.5/9.5      -  Vancomycin: S 2      Method Type: TYSON    Culture - Urine (collected 09-15-21 @ 04:15)  Source: Clean Catch Clean Catch (Midstream)  Final Report (09-16-21 @ 00:06):    <10,000 CFU/mL Normal Urogenital Juani    Culture - Blood (collected 09-15-21 @ 01:20)  Source: .Blood Blood-Peripheral  Gram Stain (09-16-21 @ 01:09):    Growth in aerobic bottle: Gram Positive Cocci in Clusters    Growth in anaerobic bottle: Gram Positive Cocci in Clusters  Final Report (09-17-21 @ 16:59):    Growth in aerobic and anaerobic bottles: Staphylococcus aureus    ***Blood Panel PCR results on this specimen are available    approximately 3 hours after the Gram stain result.***    Gram stain, PCR, and/or culture results may not always    correspond dueto difference in methodologies.    ************************************************************    This PCR assay was performed by multiplex PCR. This    Assay tests for 66 bacterial and resistance gene targets.    Please refer to the [facility] test directory    at [email or web address] for details.  Organism: Blood Culture PCR  Staphylococcus aureus (09-17-21 @ 16:59)  Organism: Staphylococcus aureus (09-17-21 @ 16:59)      -  Ampicillin/Sulbactam: S <=8/4      -  Cefazolin: S <=4      -  Clindamycin: R <=0.25 This isolate is presumed to be clindamycin resistant based on detection of inducible resistance. Clindamycin may still be effective in some patients.      -  Erythromycin: I 1      -  Gentamicin: S <=1 Should not be used as monotherapy      -  Oxacillin: S 0.5      -  RIF- Rifampin: S <=1 Should not be used as monotherapy      -  Tetra/Doxy: S <=1      -  Trimethoprim/Sulfamethoxazole: S <=0.5/9.5      -  Vancomycin: S 2      Method Type: TYSON  Organism: Blood Culture PCR (09-17-21 @ 16:59)      -  Staphylococcus aureus: Detec Any isolate of Staphylococcus aureus from a blood culture is NOT considered a contaminant.      Method Type: PCR    Culture - Blood (collected 09-15-21 @ 01:20)  Source: .Blood Blood-Peripheral  Gram Stain (09-15-21 @ 22:34):    Growth in aerobic bottle: Gram Positive Cocci in Clusters  Final Report (09-17-21 @ 17:41):    Growth in aerobic bottle: Staphylococcus aureus    See previous culture [id number]        RADIOLOGY & ADDITIONAL TESTS:    Personally reviewed.     Consultant(s) Notes Reviewed:  [x] YES  [ ] NO

## 2021-09-19 NOTE — PROGRESS NOTE ADULT - TIME BILLING
Pt seen + examined. Case discussed with resident. Agree with assessment and plan above (edited by me in detail):  Time spent: 40min. More than 50% of the visit was spent counseling the patient and pt's daughter on medical condition -- acute OM of right hallux, MSSA bacteremia, podiatry plan for amputation, dysphagia, indications for ICD, heparin bridge to surgery while off xarelto.    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    - Patient presents with LLE erythema, edema, and pain likely 2/2 cellulitis and OM  - MSSA bacteremia on admission BCx; repeat BCx from 9/16 are NGTD  - Surgical pathology of right hallux: +acute Osteomyelitis, MSSA   - Podiatry/wound care follow up, recs appreciated  - ID recs appreciated - c/w cefazolin 2gm q8h  - Wound care as per Podiatry - plan for OR on Monday for R. hallux amputation- case is an add-on, no set time predefined, but likely later in the afternoon  - Pain control  - Elevated leg to alleviate pain&swelling  - NPO after MN  - cardiology ([facility]) pre-op eval, recs appreciated  - Patient is a high-risk patient for an intermediate risk procedure and is medically optimized for the procedure     - will need to D/C heparin drip 4-6 hours prior to surgery. Patient is an add-on case for tomorrow and will likely be in the late afternoon/evening; as such, will speak to podiatry/OR scheduling again in the morning to determine when hep gtt should be stopped  - pt also to have ICD interrogated in AM prior to surgery (cardio NP called company)    - S&S consulted as pt reported coughing especially with swallowing thin liquids; S&S rec dysphagia 2 mechanical soft, nectar consistency diet

## 2021-09-19 NOTE — SWALLOW BEDSIDE ASSESSMENT ADULT - SWALLOW EVAL: DIAGNOSIS
1. Patient demonstrates a functional oral phase for puree, mechanical soft solids, honey thick, nectar thick, and thin liquids marked by functional mastication and manipulation with timely collection, containment, and transport. 2. Patient demonstrates a mild oral dysphagia for soft solids marked by prolonged mastication and manipulation resulting in delayed collection, containment, and transport. Trace lingual stasis observed post swallow which reduced with subsequent swallow. 3. Patient demonstrates a mild pharyngeal dysphagia for puree, mechanical soft solids, honey thick, and nectar thick liquids marked by suspected delayed pharyngeal swallow trigger and hyolaryngeal elevation noted by digital palpation without evidence of airway penetration/aspiration. Multiple swallows suggestive of pharyngeal stasis and/or penetration observed with large cup sip of nectar thick liquids x1, which was not reduplicated with single, small cup sips of nectar thick liquids. 4. Patient demonstrates

## 2021-09-19 NOTE — PROGRESS NOTE ADULT - ASSESSMENT
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    cardiology consulted for pre op evaluation      Cellulitis of right lower leg and Acute OM of right hallux  - MSSA bacteremia on admission BCx; repeat BCx from 9/16 are NGTD  - Surgical pathology of right hallux: +acute Osteomyelitis, staph aureus   - Podiatry/wound care follow up  - ID  -plan for OR on Monday for R. hallux amputation      Pulmonary embolism.  Bilateral PE in 7/2020  - heparin gtt - start 24hrs after last dose of Xarelto, DC 4-5hrs before surgery    Chronic systolic sp ICD  - On Lasix 20 mg PO qd, Entresto 1 tab BID, Sotalol 40 mg PO BID with hold parameters  - Tolerating Room air  - no signs of acute volume overload  - Strict I/Os, daily weights  - Monitor and replace electrolytes.  -needs ICD interrogation prior to OR  -echo as above revealing not well visualized LV, severe Mitral stenosis, left atrial enlargement     Anemia   -Likely anemia of chronic disease in the setting of pancreatic cancer  as per primary       Monitor and replete electrolytes. Keep K>4.0 and Mg>2.0.  Miriam Tran FNP-C  Cardiology NP  [id number] [phone]           80 year old male with PMHx pancreatic cancer (dx 3/2021, currently undergoing chemo), HTN, HLD, CHF (unknown EF), CAD s/p stents x2 (~15 years ago), defibrillator, TAVR (4/2021), bilateral PE (7/2021) on Xarelto, spinal stenosis, GERD, BPH, macular degeneration presents to the ED c/o R foot pain admitted for R hallux pressure injury and RLE cellulitis found to have MSSA bacteremia and acute OM of right hallux.    cardiology consulted for pre op evaluation      Cellulitis of right lower leg and Acute OM of right hallux  - MSSA bacteremia on admission BCx; repeat BCx from 9/16 are NGTD  - Surgical pathology of right hallux: +acute Osteomyelitis, staph aureus   - Podiatry/wound care follow up  - ID  -plan for OR on Monday for R. hallux amputation      Pulmonary embolism.  Bilateral PE in 7/2020  - heparin gtt - start 24hrs after last dose of Xarelto, DC 4-5hrs before surgery    Chronic systolic sp ICD  - On Lasix and entresto   - Tolerating Room air  - no signs of acute volume overload  - Strict I/Os, daily weights  - Monitor and replace electrolytes.  -needs ICD interrogation prior to OR  -unclear why on sotalol , need outpatient records   -echo as above revealing not well visualized LV, severe Mitral stenosis, left atrial enlargement     Anemia   -Likely anemia of chronic disease in the setting of pancreatic cancer  as per primary       Monitor and replete electrolytes. Keep K>4.0 and Mg>2.0.  Miriam Tran FNP-C  Cardiology NP  [facility] [phone]           80 year old male with PMHx pancreatic cancer (dx 3/2021, currently undergoing chemo), HTN, HLD, CHF (unknown EF), CAD s/p stents x2 (~15 years ago), defibrillator, TAVR (4/2021), bilateral PE (7/2021) on Xarelto, spinal stenosis, GERD, BPH, macular degeneration presents to the ED c/o R foot pain admitted for R hallux pressure injury and RLE cellulitis found to have MSSA bacteremia and acute OM of right hallux.    cardiology consulted for pre op evaluation      Cellulitis of right lower leg and Acute OM of right hallux  - MSSA bacteremia on admission BCx; repeat BCx from 9/16 are NGTD  - Surgical pathology of right hallux: +acute Osteomyelitis, staph aureus   - Podiatry/wound care follow up  - ID  -plan for OR on Monday for R. hallux amputation      Pulmonary embolism.  Bilateral PE in 7/2020  - heparin gtt - start 24hrs after last dose of Xarelto, DC 4-5hrs before surgery    Chronic systolic sp ICD  - On Lasix and entresto   - Tolerating Room air  - no signs of acute volume overload  - Strict I/Os, daily weights  - Monitor and replace electrolytes.  -needs ICD interrogation prior to OR- called   -unclear why on sotalol , need outpatient records   -echo as above revealing not well visualized LV, severe Mitral stenosis, left atrial enlargement     Anemia   -Likely anemia of chronic disease in the setting of pancreatic cancer  as per primary       Monitor and replete electrolytes. Keep K>4.0 and Mg>2.0.  Miriam Tran FNP-C  Cardiology NP  [facility] [phone]

## 2021-09-19 NOTE — SWALLOW BEDSIDE ASSESSMENT ADULT - PHARYNGEAL PHASE
Delayed pharyngeal swallow/Decreased laryngeal elevation/Delayed cough post oral intake/Complaints of pharyngeal stasis multiple swallows x1 with large cup sip of nectar thick liquids, not reduplicated with single, small cup sips of nectar thick liquids/Delayed pharyngeal swallow/Decreased laryngeal elevation Delayed pharyngeal swallow/Decreased laryngeal elevation/Cough post oral intake

## 2021-09-19 NOTE — PROGRESS NOTE ADULT - SUBJECTIVE AND OBJECTIVE BOX
[facility] -- iMlo Thomas, Adolfo, Brittanie Judd Patel, Savella  Office # [phone]      Follow Up:  cm icd htn hld     Subjective/Observations:   No events overnight resting comfortably in bed.  No complaints of chest pain, dyspnea, or palpitations reported. No signs of orthopnea or PND.      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    Benign testicular tumor    History of hip replacement        MEDICATIONS  (STANDING):  benzonatate 100 milliGRAM(s) Oral three times a day  ceFAZolin   IVPB 2000 milliGRAM(s) IV Intermittent every 8 hours  cholecalciferol 400 Unit(s) Oral daily  dorzolamide 2%/timolol 0.5% Ophthalmic Solution 1 Drop(s) Both EYES two times a day  DULoxetine 60 milliGRAM(s) Oral daily  fluticasone propionate 50 MICROgram(s)/spray Nasal Spray 1 Spray(s) Both Nostrils every 12 hours  furosemide    Tablet 20 milliGRAM(s) Oral daily  gabapentin 300 milliGRAM(s) Oral two times a day  heparin  Infusion.  Unit(s)/Hr (18 mL/Hr) IV Continuous <Continuous>  ketorolac 0.5% Ophthalmic Solution 1 Drop(s) Both EYES daily  latanoprost 0.005% Ophthalmic Solution 1 Drop(s) Both EYES at bedtime  melatonin 5 milliGRAM(s) Oral at bedtime  multivitamin 1 Tablet(s) Oral daily  pancrelipase  (CREON 36,000 Lipase Units) 2 Capsule(s) Oral three times a day with meals  pantoprazole    Tablet 40 milliGRAM(s) Oral before breakfast  pyridoxine 100 milliGRAM(s) Oral daily  sacubitril 49 mG/valsartan 51 mG 1 Tablet(s) Oral two times a day  sotalol 40 milliGRAM(s) Oral two times a day  tamsulosin 0.4 milliGRAM(s) Oral at bedtime    MEDICATIONS  (PRN):  acetaminophen   Tablet .. 650 milliGRAM(s) Oral every 6 hours PRN Temp greater or equal to 38C (100.4F), Mild Pain (1 - 3)  ALPRAZolam 0.25 milliGRAM(s) Oral at bedtime PRN anxiety  guaiFENesin Oral Liquid (Sugar-Free) 200 milliGRAM(s) Oral every 6 hours PRN Cough  heparin   Injectable 8000 Unit(s) IV Push every 6 hours PRN For aPTT less than 40  heparin   Injectable 4000 Unit(s) IV Push every 6 hours PRN For aPTT between 40 - 57  oxycodone    5 mG/acetaminophen 325 mG 1 Tablet(s) Oral every 6 hours PRN Moderate Pain (4 - 6)  prochlorperazine   Tablet 10 milliGRAM(s) Oral daily PRN nausea or vomiting      Allergies    No Known Allergies    Intolerances        Vital Signs Last 24 Hrs  T(C): 36.6 (19 Sep 2021 05:16), Max: 37.1 (18 Sep 2021 21:34)  T(F): 97.8 (19 Sep 2021 05:16), Max: 98.7 (18 Sep 2021 21:34)  HR: 91 (19 Sep 2021 05:16) (83 - 106)  BP: 102/67 (19 Sep 2021 05:16) (102/63 - 121/74)  BP(mean): --  RR: 17 (19 Sep 2021 05:16) (17 - 17)  SpO2: 93% (19 Sep 2021 05:16) (93% - 95%)    I&O's Summary    18 Sep 2021 07:01  -  19 Sep 2021 07:00  --------------------------------------------------------  IN: 334 mL / OUT: 450 mL / NET: -116 mL          PHYSICAL EXAM:  TELE: not on tele  Constitutional: NAD, awake and alert, well-developed  HEENT: Moist Mucous Membranes, Anicteric  Pulmonary: Non-labored, breath sounds are clear bilaterally, No wheezing, crackles or rhonchi  Cardiovascular: Regular, S1 and S2 nl, murmur   Gastrointestinal: Bowel Sounds present, soft, nontender.   Lymph: No lymphadenopathy. No peripheral edema.  Skin: right foot dressing   Psych:  Mood & affect appropriate    LABS: All Labs Reviewed:                        9.0    4.10  )-----------( 124      ( 19 Sep 2021 07:37 )             27.8                         8.5    4.66  )-----------( 107      ( 19 Sep 2021 00:19 )             26.8                         8.9    4.05  )-----------( 109      ( 18 Sep 2021 09:00 )             27.4     19 Sep 2021 07:37    136    |  103    |  10     ----------------------------<  102    4.3     |  29     |  0.78   18 Sep 2021 09:00    137    |  104    |  9      ----------------------------<  96     4.2     |  28     |  0.66   17 Sep 2021 08:20    137    |  105    |  11     ----------------------------<  110    4.0     |  25     |  0.60     Ca    8.1        19 Sep 2021 07:37  Ca    8.1        18 Sep 2021 09:00  Ca    7.9        17 Sep 2021 08:20    TPro  6.4    /  Alb  2.5    /  TBili  0.5    /  DBili  x      /  AST  20     /  ALT  17     /  AlkPhos  115    19 Sep 2021 07:37  TPro  6.3    /  Alb  2.5    /  TBili  0.7    /  DBili  x      /  AST  21     /  ALT  14     /  AlkPhos  119    18 Sep 2021 09:00    PT/INR - ( 19 Sep 2021 07:37 )   PT: 16.2 sec;   INR: 1.41 ratio         PTT - ( 19 Sep 2021 07:37 )  PTT:144.0 sec      < from: TTE Echo Complete w/o Contrast w/ Doppler (09.18.21 @ 08:10) >  EXAM:  ECHO TTE WO CON COMP W DOPP         PROCEDURE DATE:  09/18/2021        INTERPRETATION:  INDICATION: Infectious endocarditis  Sonographer KL    Blood Pressure 108/71    Height 185.4 cm     Weight 97.5 kg       BSA 2.22 sq m    Dimensions:  LA 4.3       Normal Values: 2.0 - 4.0 cm  Ao 3.5        Normal Values: 2.0 - 3.8 cm  SEPTUM 1.3       Normal Values: 0.6 - 1.2 cm  PWT 1.2       Normal Values: 0.6 - 1.1 cm  LVIDd 4.9         Normal Values: 3.0 - 5.6 cm  LVIDs 4.3         Normal Values: 1.8 - 4.0 cm      OBSERVATIONS:  Technically difficult and very limited study  Mitral Valve: Mitral annular calcification and calcified leaflets with restricted opening. Mean transmitral valve gradient equals 10.6 mmHg which is consistent with severe mitral stenosis.  Mild MR.  Aortic Valve/Aorta: Patient has a history of bioprosthetic aortic valve replacement. Peak transaortic valve gradient 21 mmHg with a mean transaortic valve gradient of 11 mmHg. This is probably normal in the setting of a prosthetic valve  Tricuspid Valve: Not well-visualized  Pulmonic Valve: Not well-visualized  Left Atrium: Enlarged  Right Atrium: Not well-visualized  Left Ventricle: The left ventricular endocardium is not well-visualized. Unable to accurately assess left ventricular function. If clinically indicated can consider further imaging with echo contrast  Right Ventricle: The right ventricle is not well-visualized. A device wire is noted within the right heart  Pericardium: no significant pericardial effusion.  Pulmonary/RV Pressure: estimated PA systolic pressure of 39 mmHg        IMPRESSION:  Technically difficult and very limited study  The left ventricular endocardium is not well-visualized. Unable to accurately assess left ventricular function. If clinically indicated can consider further imaging with echo contrast  The right ventricle is not well-visualized. A device wire is noted within the right heart  Patient has a history of bioprosthetic aortic valve replacement. Peak transaortic valvegradient 21 mmHg with a mean transaortic valve gradient of 11 mmHg. This is probably normal in the setting of a prosthetic valve  Calcified mitral valve with restricted opening. Valve gradients are consistent with severe mitral stenosis  Mild MR  Left atrial enlargement

## 2021-09-19 NOTE — SWALLOW BEDSIDE ASSESSMENT ADULT - COMMENTS
Consult received and chart reviewed. Consult received and chart reviewed. Patient seen at bedside this AM for initial assessment of swallow function. As per discussion with Team, (Resident x [phone]), Patient is currently medically cleared for administration of all PO consistencies. Patient was alert and cooperative. Patient demonstrates ability to follow simple commands. Patient presented with coughing at baseline. Patient reported he tolerates a regular consistency diet and thin liquids at home; however has been noticing coughing during meals, especially with liquids. Patient downgraded to soft and nectar thick liquids, as per MD and Patient reported he has been tolerating this diet level without difficulty.    As per charting, Patient is a "80 year old male with PMHx pancreatic cancer (dx 3/2021, currently undergoing chemo), HTN, HLD, CHF (unknown EF) CAD s/p stents x2 (~15 years ago), defibrillator, 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" WBC WFL. Most recent CXR revealed "No acute chest finding. Ankle edema and degenerative changes in the foot again seen. Fairly advanced right knee degeneration also again noted."    Discussed results and recommendations with Patient, RN, and call out to MD.

## 2021-09-19 NOTE — PROGRESS NOTE ADULT - SUBJECTIVE AND OBJECTIVE BOX
pt seen  no complaints  ICU Vital Signs Last 24 Hrs  T(C): 36.6 (19 Sep 2021 05:16), Max: 37.1 (18 Sep 2021 21:34)  T(F): 97.8 (19 Sep 2021 05:16), Max: 98.7 (18 Sep 2021 21:34)  HR: 91 (19 Sep 2021 05:16) (83 - 106)  BP: 102/67 (19 Sep 2021 05:16) (102/63 - 121/74)  BP(mean): --  ABP: --  ABP(mean): --  RR: 17 (19 Sep 2021 05:16) (17 - 17)  SpO2: 93% (19 Sep 2021 05:16) (93% - 95%)  gen-NAD  ext- hematoma unchanged                          9.0    4.10  )-----------( 124      ( 19 Sep 2021 07:37 )             27.8

## 2021-09-19 NOTE — SWALLOW BEDSIDE ASSESSMENT ADULT - SWALLOW EVAL: RECOMMENDED DIET
Dysphagia 2 (mechanical soft) and nectar thick liquids, aspiration precautions, as tolerated Dysphagia 2 (mechanical soft) and nectar thick liquids (single, small cup sips only), with aspiration precautions, as tolerated

## 2021-09-19 NOTE — PROGRESS NOTE ADULT - ASSESSMENT
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     Problem/Plan - 1:  ·  Problem: Cellulitis of right lower leg and Acute OM of right hallux  ·  Plan: Patient presents with LLE erythema, edema, and pain likely 2/2 cellulitis  - MSSA bacteremia on admission BCx; repeat BCx from 9/16 are NGTD  - Surgical pathology of right hallux: +acute Osteomyelitis, staph aureus   - Podiatry/wound care follow up  - ID recs appreciated - c/w cefazolin 2gm q8h  - Wound care as per Podiatry - plan for OR on Monday for R. hallux amputation- case is an add-on, no set time predefined, but likely in the afternoon  - Pain control  - Elevated leg to alleviate pain&swelling  - TTE very limited study, unable to assess LV or RV function, severe MS, bioAVR  - cardiology ([facility]) pre-op eval, recs appreciated  - Patient is moderate risk and optimized for low risk procedure with routine hemodynamic monitoring.      Problem/Plan - 2:  ·  Problem: Injury of right toe.   ·  Plan: Pt follows Dr. Felipe/Rene for R hallux hammertoe dorsal stage 3 pressure injury  - Fall precautions  - Podiatry (Dr. López) consulted, recs appreciated  - now plan for OR on Monday for R. hallux amputation given OM and bacteremia     Problem/Plan - 3:  ·  Problem: HTN (hypertension).   - C/w Lasix 20 mg PO qd, Entresto 1 tab BID, Sotalol 40 mg PO BID with hold parameters  - BP low normal, continue to monitor off Bystolic ( at home on 5mg PO qd )  - Monitor routine hemodynamics.     Problem/Plan - 4:  ·  Problem: Pulmonary embolism.   ·  Plan: Bilateral PE in 7/2020  - on xarelto at home, switched to heparin gtt on 9/18 in anticipation of OR  - will need to DC 5-6 hours prior to surgery. patient is an add-on case for tmrw and will likely for in the afternoon/evening; as such, will speak to podiatry again in the morning to determine when hep gtt should be stopped  - Patient is scheduled for Whipple's procedure on 9/23/2021 and was advised to stop Xarelto on 9/20 and start Heparin.     Problem/Plan - 5:  ·  Problem: CHF (congestive heart failure).   ·  Plan: Chronic systolic CHF s/p AICD  - On Lasix 20 mg PO qd, Entresto 1 tab BID, Sotalol 40 mg PO BID with hold parameters  - Tolerating Room air  - no signs of acute volume overload  - Strict I/Os, daily weights  - Monitor and replace electrolytes.  - f/up TTE     Problem/Plan - 6:  ·  Problem: S/P TAVR (transcatheter aortic valve replacement).   ·  Plan: S/p TAVR in 4/2021  - Stable.     Problem/Plan - 7:  ·  Problem: Anemia.   ·  Plan: Likely anemia of chronic disease in the setting of pancreatic cancer  - H/H 9.8/30.0 on admission, baseline hemoglobin unknown  - Currently hemodynamically stable, monitor for signs and symptoms of active bleeding  - Consider transfusion for hemoglobin <8  - continue to monitor thrombocytopenia.     Problem/Plan - 8:  ·  Problem: CAD (coronary artery disease).   ·  Plan: Chronic, s/p cardiac stents x2 (15 years ago), AICD, TAVR (4/2021), bilateral PE (7/2021) on Xarelto  - home Xarelto, c/w heparin gtt      Problem/Plan - 9:  ·  Problem: Pancreatic cancer.   ·  Plan: Chronic, diagnosed in March 2021 on chemotherapy (does not recall name of medication)  - Continue home medication Pancrelipase 36,000 2 tabs PO TID with meals  - Pt scheduled for Whipple's procedure on 9/23/2021 with Dr. Shon Murphy at [place]. Dr. Menendez aware that patient is currently hospitalized.      Problem/Plan - 10:  ·  Problem: Need for prophylactic measure.   ·  Plan; VTE ppx: - on hep gtt for AC   80 year old male with PMHx pancreatic cancer (dx 3/2021, currently undergoing chemo), HTN, HLD, CHF (unknown EF), CAD s/p stents x2 (~15 years ago), defibrillator, TAVR (4/2021), bilateral PE (7/2021) on Xarelto, spinal stenosis, GERD, BPH, macular degeneration presents to the ED c/o R foot pain admitted for R hallux pressure injury and RLE cellulitis found to have MSSA bacteremia and acute OM of right hallux.     Problem/Plan - 1:  ·  Problem: Cellulitis of right lower leg and Acute OM of right hallux  ·  Plan: Patient presents with LLE erythema, edema, and pain likely 2/2 cellulitis  - MSSA bacteremia on admission BCx; repeat BCx from 9/16 are NGTD  - Surgical pathology of right hallux: +acute Osteomyelitis, staph aureus   - Podiatry/wound care follow up  - ID recs appreciated - c/w cefazolin 2gm q8h  - Wound care as per Podiatry - plan for OR on Monday for R. hallux amputation- case is an add-on, no set time predefined, but likely in the afternoon  - Pain control  - Elevated leg to alleviate pain&swelling  - TTE very limited study, unable to assess LV or RV function, severe MS, bioAVR  - NPO after MN  - cardiology ([facility]) pre-op eval, recs appreciated  - Patient is moderate risk and optimized for low risk procedure with routine hemodynamic monitoring.      Problem/Plan - 2:  ·  Problem: Injury of right toe.   ·  Plan: Pt follows Dr. Felipe/Rene for R hallux hammertoe dorsal stage 3 pressure injury  - Fall precautions  - Podiatry (Dr. López) consulted, recs appreciated  - now plan for OR on Monday for R. hallux amputation given OM and bacteremia     Problem/Plan - 3:  ·  Problem: HTN (hypertension).   - C/w Lasix 20 mg PO qd, Entresto 1 tab BID, Sotalol 40 mg PO BID with hold parameters  - BP low normal, continue to monitor off Bystolic ( at home on 5mg PO qd )  - Monitor routine hemodynamics.     Problem/Plan - 4:  ·  Problem: Pulmonary embolism.   ·  Plan: Bilateral PE in 7/2020  - on xarelto at home, switched to heparin gtt on 9/18 in anticipation of OR  - will need to DC 5-6 hours prior to surgery. patient is an add-on case for tmrw and will likely for in the afternoon/evening; as such, will speak to podiatry again in the morning to determine when hep gtt should be stopped  - Patient is scheduled for Whipple's procedure on 9/23/2021 and was advised to stop Xarelto on 9/20 and start Heparin.     Problem/Plan - 5:  ·  Problem: CHF (congestive heart failure).   ·  Plan: Chronic systolic CHF s/p AICD  - On Lasix 20 mg PO qd, Entresto 1 tab BID, Sotalol 40 mg PO BID with hold parameters  - Tolerating Room air  - no signs of acute volume overload  - Strict I/Os, daily weights  - Monitor and replace electrolytes.  - f/up TTE     Problem/Plan - 6:  ·  Problem: S/P TAVR (transcatheter aortic valve replacement).   ·  Plan: S/p TAVR in 4/2021  - Stable.     Problem/Plan - 7:  ·  Problem: Anemia.   ·  Plan: Likely anemia of chronic disease in the setting of pancreatic cancer  - H/H 9.8/30.0 on admission, baseline hemoglobin unknown  - Currently hemodynamically stable, monitor for signs and symptoms of active bleeding  - Consider transfusion for hemoglobin <8  - continue to monitor thrombocytopenia.     Problem/Plan - 8:  ·  Problem: CAD (coronary artery disease).   ·  Plan: Chronic, s/p cardiac stents x2 (15 years ago), AICD, TAVR (4/2021), bilateral PE (7/2021) on Xarelto  - home Xarelto, c/w heparin gtt      Problem/Plan - 9:  ·  Problem: Pancreatic cancer.   ·  Plan: Chronic, diagnosed in March 2021 on chemotherapy (does not recall name of medication)  - Continue home medication Pancrelipase 36,000 2 tabs PO TID with meals  - Pt scheduled for Whipple's procedure on 9/23/2021 with Dr. Shon Murphy at [place]. Dr. Menendez aware that patient is currently hospitalized.     Problem/Plan -  10:  - Problem: Dysphagia  - patient with complaints of coughing/choking on food on 9/18  - S/S consulted, recs dysphagia 2 mechanical soft, nectar consistency     Problem/Plan - 10:  ·  Problem: Need for prophylactic measure.   ·  Plan; VTE ppx: - on hep gtt for AC   80 year old male with PMHx pancreatic cancer (dx 3/2021, currently undergoing chemo), HTN, HLD, CHF (unknown EF), CAD s/p stents x2 (~15 years ago), defibrillator (>20 years ago, likely placed after episode of sustained VT without arrest per pt/family description), TAVR (4/2021), bilateral PE (7/2021) on Xarelto, spinal stenosis, GERD, BPH, macular degeneration presents to the ED c/o R foot pain admitted for R hallux pressure injury and RLE cellulitis found to have MSSA bacteremia and acute OM of right hallux.     Problem/Plan - 1:  ·  Problem: Cellulitis of right lower leg and Acute OM of right hallux  ·  Plan: Patient presents with LLE erythema, edema, and pain likely 2/2 cellulitis and OM  - MSSA bacteremia on admission BCx; repeat BCx from 9/16 are NGTD  - Surgical pathology of right hallux: +acute Osteomyelitis, MSSA   - Podiatry/wound care follow up, recs appreciated  - ID recs appreciated - c/w cefazolin 2gm q8h  - Wound care as per Podiatry - plan for OR on Monday for R. hallux amputation- case is an add-on, no set time predefined, but likely later in the afternoon  - Pain control  - Elevated leg to alleviate pain&swelling  - NPO after MN  - cardiology ([facility]) pre-op eval, recs appreciated  - Patient is a high-risk patient for an intermediate risk procedure and is medically optimized for the procedure      Problem/Plan - 2:  ·  Problem: Injury of right toe.   ·  Plan: Pt follows Dr. Felipe/Rene for R hallux hammertoe dorsal stage 3 pressure injury  - Fall precautions  - Podiatry (Dr. López) consulted, recs appreciated  - now plan for OR on Monday for R. hallux amputation given OM and bacteremia     Problem/Plan - 3:  ·  Problem: HTN (hypertension).   - C/w Lasix 20 mg PO qd, Entresto 1 tab BID, Sotalol 40 mg PO BID with hold parameters  - BP low normal, continue to monitor off Bystolic ( at home on 5mg PO qd ), unless cardio recs to start metoprolol interchange  - Monitor VS     Problem/Plan - 4:  ·  Problem: Pulmonary embolism.   ·  Plan: Bilateral PE in 7/2020  - on xarelto at home, switched to heparin gtt on 9/18 in anticipation of OR  - will need to D/C heparin drip 4-6 hours prior to surgery. Patient is an add-on case for tomorrow and will likely be in the late afternoon/evening; as such, will speak to podiatry/OR scheduling again in the morning to determine when hep gtt should be stopped  - Patient is scheduled for Whipple's procedure on 9/23/2021 and was advised to stop Xarelto on 9/20 and start Heparin.     Problem/Plan - 5:  ·  Problem: CHF (congestive heart failure).   ·  Plan: CHF (likely chronic systolic CHF)  - On Lasix 20 mg PO qd, Entresto 1 tab BID, Sotalol 40 mg PO BID with hold parameters  - Tolerating Room air  - no signs of acute volume overload  - Strict I/Os, daily weights  - Monitor and replace electrolytes.  - TTE very limited study, unable to assess LV or RV function, severe MS, bioAVR ; cardio plan to redo TTE with Definity contrast for potentially better visualization of the endocardium, but per Dr. Charlton, the repeat TTE is not needed for pt to proceed with hallux amputation     Problem/Plan - 6:  ·  Problem: Hx of arrhythmia  - discussed with pt and his children the reason for his ICD placement  - they stated that >20 years ago, the pt had a very fast HR in the setting of a viral infection and the ICD was placed  - pt/family said he did not have a cardiac arrest; thus suspect pt had sustained VT with pulse and had ICD placement  - cardio called company to interrogate ICD   - c/w sotalol      Problem/Plan - 7:  ·  Problem: Anemia.   ·  Plan: Likely anemia of chronic disease in the setting of pancreatic cancer  - H/H 9.8/30.0 on admission, baseline hemoglobin unknown  - Currently hemodynamically stable, monitor for signs and symptoms of active bleeding  - Consider transfusion for hemoglobin <8  - continue to monitor thrombocytopenia (improving to near normal now)     Problem/Plan - 8:  ·  Problem: CAD (coronary artery disease).   ·  Plan: Chronic, s/p cardiac stents x2 (15 years ago)  - pt on Xarelto, but not on ASA or statin ; will try to obtain collateral as to the reason     Problem/Plan - 9:  ·  Problem: Pancreatic cancer.   ·  Plan: Chronic, diagnosed in March 2021 on chemotherapy (does not recall name of medication)  - Continue home medication Pancrelipase 36,000 2 tabs PO TID with meals  - Pt scheduled for Whipple's procedure on 9/23/2021 with Dr. Shon Murphy at [place]. Dr. Murphy aware that patient is currently hospitalized and surgery will likely be postponed.     Problem/Plan -  10:  - Problem: Dysphagia  - patient with complaints of coughing/choking on food on 9/18  - S/S consulted, recs dysphagia 2 mechanical soft, nectar consistency     Problem/Plan - 11:  ·  Problem: Need for prophylactic measure.   ·  Plan; VTE ppx: - on hep gtt for A/C

## 2021-09-19 NOTE — SWALLOW BEDSIDE ASSESSMENT ADULT - SWALLOW EVAL: PROGNOSIS
DIAGNOSIS CONTINUED: a moderate/severe pharyngeal dysphagia for soft solids and thin liquids marked by suspected delayed pharyngeal swallow trigger, hyolaryngeal elevation noted by digital palpation, delayed coughing and c/o globus sensation with soft solids, and immediate coughing post swallow with thin liquids.  5. Recommend Dysphagia 2 (mechanical soft) and nectar thick liquids, via single/small cup sip, with aspiration precautions as tolerated. Continue to monitor and notify SLP if changes occur. PROGNOSIS: good for recommended diet level

## 2021-09-20 ENCOUNTER — RESULT REVIEW (OUTPATIENT)
Age: 81
End: 2021-09-20

## 2021-09-20 LAB
ALBUMIN SERPL ELPH-MCNC: 2.7 G/DL — LOW (ref 3.3–5)
ALP SERPL-CCNC: 113 U/L — SIGNIFICANT CHANGE UP (ref 40–120)
ALT FLD-CCNC: 14 U/L — SIGNIFICANT CHANGE UP (ref 12–78)
ANION GAP SERPL CALC-SCNC: 7 MMOL/L — SIGNIFICANT CHANGE UP (ref 5–17)
APTT BLD: 86.3 SEC — HIGH (ref 27.5–35.5)
AST SERPL-CCNC: 19 U/L — SIGNIFICANT CHANGE UP (ref 15–37)
BASOPHILS # BLD AUTO: 0 K/UL — SIGNIFICANT CHANGE UP (ref 0–0.2)
BASOPHILS NFR BLD AUTO: 0 % — SIGNIFICANT CHANGE UP (ref 0–2)
BILIRUB SERPL-MCNC: 0.5 MG/DL — SIGNIFICANT CHANGE UP (ref 0.2–1.2)
BUN SERPL-MCNC: 9 MG/DL — SIGNIFICANT CHANGE UP (ref 7–23)
CALCIUM SERPL-MCNC: 8.3 MG/DL — LOW (ref 8.5–10.1)
CHLORIDE SERPL-SCNC: 103 MMOL/L — SIGNIFICANT CHANGE UP (ref 96–108)
CO2 SERPL-SCNC: 29 MMOL/L — SIGNIFICANT CHANGE UP (ref 22–31)
CREAT SERPL-MCNC: 0.6 MG/DL — SIGNIFICANT CHANGE UP (ref 0.5–1.3)
EOSINOPHIL # BLD AUTO: 0.07 K/UL — SIGNIFICANT CHANGE UP (ref 0–0.5)
EOSINOPHIL NFR BLD AUTO: 2 % — SIGNIFICANT CHANGE UP (ref 0–6)
GLUCOSE SERPL-MCNC: 100 MG/DL — HIGH (ref 70–99)
HCT VFR BLD CALC: 26.4 % — LOW (ref 39–50)
HGB BLD-MCNC: 8.5 G/DL — LOW (ref 13–17)
LYMPHOCYTES # BLD AUTO: 0.55 K/UL — LOW (ref 1–3.3)
LYMPHOCYTES # BLD AUTO: 16 % — SIGNIFICANT CHANGE UP (ref 13–44)
MCHC RBC-ENTMCNC: 32.2 GM/DL — SIGNIFICANT CHANGE UP (ref 32–36)
MCHC RBC-ENTMCNC: 33.5 PG — SIGNIFICANT CHANGE UP (ref 27–34)
MCV RBC AUTO: 103.9 FL — HIGH (ref 80–100)
MONOCYTES # BLD AUTO: 0.48 K/UL — SIGNIFICANT CHANGE UP (ref 0–0.9)
MONOCYTES NFR BLD AUTO: 14 % — SIGNIFICANT CHANGE UP (ref 2–14)
NEUTROPHILS # BLD AUTO: 2.27 K/UL — SIGNIFICANT CHANGE UP (ref 1.8–7.4)
NEUTROPHILS NFR BLD AUTO: 65 % — SIGNIFICANT CHANGE UP (ref 43–77)
NRBC # BLD: SIGNIFICANT CHANGE UP /100 WBCS (ref 0–0)
PLATELET # BLD AUTO: 170 K/UL — SIGNIFICANT CHANGE UP (ref 150–400)
POTASSIUM SERPL-MCNC: 4 MMOL/L — SIGNIFICANT CHANGE UP (ref 3.5–5.3)
POTASSIUM SERPL-SCNC: 4 MMOL/L — SIGNIFICANT CHANGE UP (ref 3.5–5.3)
PROT SERPL-MCNC: 6.6 G/DL — SIGNIFICANT CHANGE UP (ref 6–8.3)
RBC # BLD: 2.54 M/UL — LOW (ref 4.2–5.8)
RBC # FLD: 15.7 % — HIGH (ref 10.3–14.5)
SARS-COV-2 RNA SPEC QL NAA+PROBE: SIGNIFICANT CHANGE UP
SODIUM SERPL-SCNC: 139 MMOL/L — SIGNIFICANT CHANGE UP (ref 135–145)
WBC # BLD: 3.44 K/UL — LOW (ref 3.8–10.5)
WBC # FLD AUTO: 3.44 K/UL — LOW (ref 3.8–10.5)

## 2021-09-20 PROCEDURE — 99232 SBSQ HOSP IP/OBS MODERATE 35: CPT | Mod: 25

## 2021-09-20 PROCEDURE — 93282 PRGRMG EVAL IMPLANTABLE DFB: CPT | Mod: 26

## 2021-09-20 PROCEDURE — 88305 TISSUE EXAM BY PATHOLOGIST: CPT | Mod: 26

## 2021-09-20 PROCEDURE — 73630 X-RAY EXAM OF FOOT: CPT | Mod: 26,RT

## 2021-09-20 PROCEDURE — 28810 AMPUTATION TOE & METATARSAL: CPT | Mod: T5

## 2021-09-20 PROCEDURE — 99233 SBSQ HOSP IP/OBS HIGH 50: CPT

## 2021-09-20 PROCEDURE — 88311 DECALCIFY TISSUE: CPT | Mod: 26

## 2021-09-20 PROCEDURE — 99233 SBSQ HOSP IP/OBS HIGH 50: CPT | Mod: GC

## 2021-09-20 RX ORDER — FUROSEMIDE 40 MG
20 TABLET ORAL DAILY
Refills: 0 | Status: DISCONTINUED | OUTPATIENT
Start: 2021-09-20 | End: 2021-09-26

## 2021-09-20 RX ORDER — TAMSULOSIN HYDROCHLORIDE 0.4 MG/1
0.4 CAPSULE ORAL AT BEDTIME
Refills: 0 | Status: DISCONTINUED | OUTPATIENT
Start: 2021-09-20 | End: 2021-09-26

## 2021-09-20 RX ORDER — KETOROLAC TROMETHAMINE 0.5 %
1 DROPS OPHTHALMIC (EYE) DAILY
Refills: 0 | Status: DISCONTINUED | OUTPATIENT
Start: 2021-09-20 | End: 2021-09-26

## 2021-09-20 RX ORDER — HEPARIN SODIUM 5000 [USP'U]/ML
4000 INJECTION INTRAVENOUS; SUBCUTANEOUS EVERY 6 HOURS
Refills: 0 | Status: DISCONTINUED | OUTPATIENT
Start: 2021-09-20 | End: 2021-09-20

## 2021-09-20 RX ORDER — HEPARIN SODIUM 5000 [USP'U]/ML
4000 INJECTION INTRAVENOUS; SUBCUTANEOUS EVERY 6 HOURS
Refills: 0 | Status: DISCONTINUED | OUTPATIENT
Start: 2021-09-20 | End: 2021-09-21

## 2021-09-20 RX ORDER — ONDANSETRON 8 MG/1
4 TABLET, FILM COATED ORAL ONCE
Refills: 0 | Status: DISCONTINUED | OUTPATIENT
Start: 2021-09-20 | End: 2021-09-20

## 2021-09-20 RX ORDER — DULOXETINE HYDROCHLORIDE 30 MG/1
60 CAPSULE, DELAYED RELEASE ORAL DAILY
Refills: 0 | Status: DISCONTINUED | OUTPATIENT
Start: 2021-09-20 | End: 2021-09-26

## 2021-09-20 RX ORDER — HEPARIN SODIUM 5000 [USP'U]/ML
8000 INJECTION INTRAVENOUS; SUBCUTANEOUS ONCE
Refills: 0 | Status: DISCONTINUED | OUTPATIENT
Start: 2021-09-20 | End: 2021-09-21

## 2021-09-20 RX ORDER — DORZOLAMIDE HYDROCHLORIDE TIMOLOL MALEATE 20; 5 MG/ML; MG/ML
1 SOLUTION/ DROPS OPHTHALMIC
Refills: 0 | Status: DISCONTINUED | OUTPATIENT
Start: 2021-09-20 | End: 2021-09-26

## 2021-09-20 RX ORDER — ACETAMINOPHEN 500 MG
650 TABLET ORAL EVERY 6 HOURS
Refills: 0 | Status: DISCONTINUED | OUTPATIENT
Start: 2021-09-20 | End: 2021-09-26

## 2021-09-20 RX ORDER — HEPARIN SODIUM 5000 [USP'U]/ML
INJECTION INTRAVENOUS; SUBCUTANEOUS
Qty: 25000 | Refills: 0 | Status: DISCONTINUED | OUTPATIENT
Start: 2021-09-20 | End: 2021-09-21

## 2021-09-20 RX ORDER — ALPRAZOLAM 0.25 MG
0.25 TABLET ORAL AT BEDTIME
Refills: 0 | Status: DISCONTINUED | OUTPATIENT
Start: 2021-09-20 | End: 2021-09-26

## 2021-09-20 RX ORDER — SOTALOL HCL 120 MG
40 TABLET ORAL
Refills: 0 | Status: DISCONTINUED | OUTPATIENT
Start: 2021-09-20 | End: 2021-09-26

## 2021-09-20 RX ORDER — SODIUM CHLORIDE 9 MG/ML
1000 INJECTION, SOLUTION INTRAVENOUS
Refills: 0 | Status: DISCONTINUED | OUTPATIENT
Start: 2021-09-20 | End: 2021-09-20

## 2021-09-20 RX ORDER — PYRIDOXINE HCL (VITAMIN B6) 100 MG
100 TABLET ORAL DAILY
Refills: 0 | Status: DISCONTINUED | OUTPATIENT
Start: 2021-09-20 | End: 2021-09-26

## 2021-09-20 RX ORDER — OXYCODONE AND ACETAMINOPHEN 5; 325 MG/1; MG/1
1 TABLET ORAL EVERY 6 HOURS
Refills: 0 | Status: DISCONTINUED | OUTPATIENT
Start: 2021-09-20 | End: 2021-09-26

## 2021-09-20 RX ORDER — SOTALOL HCL 120 MG
40 TABLET ORAL
Refills: 0 | Status: DISCONTINUED | OUTPATIENT
Start: 2021-09-20 | End: 2021-09-20

## 2021-09-20 RX ORDER — SACUBITRIL AND VALSARTAN 24; 26 MG/1; MG/1
1 TABLET, FILM COATED ORAL
Refills: 0 | Status: DISCONTINUED | OUTPATIENT
Start: 2021-09-20 | End: 2021-09-26

## 2021-09-20 RX ORDER — HEPARIN SODIUM 5000 [USP'U]/ML
8000 INJECTION INTRAVENOUS; SUBCUTANEOUS EVERY 6 HOURS
Refills: 0 | Status: DISCONTINUED | OUTPATIENT
Start: 2021-09-20 | End: 2021-09-21

## 2021-09-20 RX ORDER — LATANOPROST 0.05 MG/ML
1 SOLUTION/ DROPS OPHTHALMIC; TOPICAL AT BEDTIME
Refills: 0 | Status: DISCONTINUED | OUTPATIENT
Start: 2021-09-20 | End: 2021-09-26

## 2021-09-20 RX ORDER — PANTOPRAZOLE SODIUM 20 MG/1
40 TABLET, DELAYED RELEASE ORAL
Refills: 0 | Status: DISCONTINUED | OUTPATIENT
Start: 2021-09-20 | End: 2021-09-26

## 2021-09-20 RX ORDER — LIPASE/PROTEASE/AMYLASE 16-48-48K
2 CAPSULE,DELAYED RELEASE (ENTERIC COATED) ORAL
Refills: 0 | Status: DISCONTINUED | OUTPATIENT
Start: 2021-09-20 | End: 2021-09-26

## 2021-09-20 RX ORDER — LANOLIN ALCOHOL/MO/W.PET/CERES
5 CREAM (GRAM) TOPICAL AT BEDTIME
Refills: 0 | Status: DISCONTINUED | OUTPATIENT
Start: 2021-09-20 | End: 2021-09-26

## 2021-09-20 RX ORDER — METOCLOPRAMIDE HCL 10 MG
10 TABLET ORAL ONCE
Refills: 0 | Status: DISCONTINUED | OUTPATIENT
Start: 2021-09-20 | End: 2021-09-20

## 2021-09-20 RX ORDER — GABAPENTIN 400 MG/1
300 CAPSULE ORAL
Refills: 0 | Status: DISCONTINUED | OUTPATIENT
Start: 2021-09-20 | End: 2021-09-26

## 2021-09-20 RX ORDER — CHOLECALCIFEROL (VITAMIN D3) 125 MCG
400 CAPSULE ORAL DAILY
Refills: 0 | Status: DISCONTINUED | OUTPATIENT
Start: 2021-09-20 | End: 2021-09-26

## 2021-09-20 RX ORDER — GABAPENTIN 400 MG/1
300 CAPSULE ORAL ONCE
Refills: 0 | Status: COMPLETED | OUTPATIENT
Start: 2021-09-20 | End: 2021-09-20

## 2021-09-20 RX ORDER — HYDROMORPHONE HYDROCHLORIDE 2 MG/ML
0.5 INJECTION INTRAMUSCULAR; INTRAVENOUS; SUBCUTANEOUS
Refills: 0 | Status: DISCONTINUED | OUTPATIENT
Start: 2021-09-20 | End: 2021-09-20

## 2021-09-20 RX ORDER — CEFAZOLIN SODIUM 1 G
2000 VIAL (EA) INJECTION EVERY 8 HOURS
Refills: 0 | Status: DISCONTINUED | OUTPATIENT
Start: 2021-09-20 | End: 2021-09-26

## 2021-09-20 RX ORDER — PROCHLORPERAZINE MALEATE 5 MG
10 TABLET ORAL DAILY
Refills: 0 | Status: DISCONTINUED | OUTPATIENT
Start: 2021-09-20 | End: 2021-09-26

## 2021-09-20 RX ADMIN — Medication 100 MILLIGRAM(S): at 13:02

## 2021-09-20 RX ADMIN — TAMSULOSIN HYDROCHLORIDE 0.4 MILLIGRAM(S): 0.4 CAPSULE ORAL at 21:40

## 2021-09-20 RX ADMIN — GABAPENTIN 300 MILLIGRAM(S): 400 CAPSULE ORAL at 05:40

## 2021-09-20 RX ADMIN — OXYCODONE AND ACETAMINOPHEN 1 TABLET(S): 5; 325 TABLET ORAL at 12:06

## 2021-09-20 RX ADMIN — GABAPENTIN 300 MILLIGRAM(S): 400 CAPSULE ORAL at 18:35

## 2021-09-20 RX ADMIN — Medication 100 MILLIGRAM(S): at 12:07

## 2021-09-20 RX ADMIN — Medication 20 MILLIGRAM(S): at 05:41

## 2021-09-20 RX ADMIN — OXYCODONE AND ACETAMINOPHEN 1 TABLET(S): 5; 325 TABLET ORAL at 13:00

## 2021-09-20 RX ADMIN — SODIUM CHLORIDE 65 MILLILITER(S): 9 INJECTION, SOLUTION INTRAVENOUS at 06:01

## 2021-09-20 RX ADMIN — Medication 1 DROP(S): at 12:08

## 2021-09-20 RX ADMIN — Medication 40 MILLIGRAM(S): at 17:58

## 2021-09-20 RX ADMIN — DULOXETINE HYDROCHLORIDE 60 MILLIGRAM(S): 30 CAPSULE, DELAYED RELEASE ORAL at 12:07

## 2021-09-20 RX ADMIN — LATANOPROST 1 DROP(S): 0.05 SOLUTION/ DROPS OPHTHALMIC; TOPICAL at 21:40

## 2021-09-20 RX ADMIN — Medication 400 UNIT(S): at 12:07

## 2021-09-20 RX ADMIN — Medication 100 MILLIGRAM(S): at 05:40

## 2021-09-20 RX ADMIN — Medication 1 TABLET(S): at 12:07

## 2021-09-20 RX ADMIN — DORZOLAMIDE HYDROCHLORIDE TIMOLOL MALEATE 1 DROP(S): 20; 5 SOLUTION/ DROPS OPHTHALMIC at 05:40

## 2021-09-20 RX ADMIN — Medication 2 CAPSULE(S): at 17:58

## 2021-09-20 RX ADMIN — SODIUM CHLORIDE 75 MILLILITER(S): 9 INJECTION, SOLUTION INTRAVENOUS at 17:59

## 2021-09-20 RX ADMIN — Medication 40 MILLIGRAM(S): at 05:41

## 2021-09-20 RX ADMIN — SACUBITRIL AND VALSARTAN 1 TABLET(S): 24; 26 TABLET, FILM COATED ORAL at 17:58

## 2021-09-20 RX ADMIN — HEPARIN SODIUM 1300 UNIT(S)/HR: 5000 INJECTION INTRAVENOUS; SUBCUTANEOUS at 08:23

## 2021-09-20 RX ADMIN — PANTOPRAZOLE SODIUM 40 MILLIGRAM(S): 20 TABLET, DELAYED RELEASE ORAL at 05:40

## 2021-09-20 RX ADMIN — DORZOLAMIDE HYDROCHLORIDE TIMOLOL MALEATE 1 DROP(S): 20; 5 SOLUTION/ DROPS OPHTHALMIC at 18:36

## 2021-09-20 RX ADMIN — OXYCODONE AND ACETAMINOPHEN 1 TABLET(S): 5; 325 TABLET ORAL at 21:47

## 2021-09-20 RX ADMIN — Medication 5 MILLIGRAM(S): at 21:40

## 2021-09-20 RX ADMIN — Medication 1 SPRAY(S): at 05:44

## 2021-09-20 RX ADMIN — Medication 100 MILLIGRAM(S): at 21:48

## 2021-09-20 RX ADMIN — Medication 100 MILLIGRAM(S): at 13:01

## 2021-09-20 NOTE — PROGRESS NOTE ADULT - TIME BILLING
Pt seen + examined. Case discussed with resident. 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     9/20: ICD interrogation done this AM per cardiology, records in paper chart. Hep gtt dc at 10am w/ plan for surgery at 2pm. Will need to f/u with podiatry post op regarding resumption of AC.   Resdient to reach out to pts surgeon at [facility] in regards to previously planned whipple procedure for later this week.     - Patient presents with LLE erythema, edema, and pain likely 2/2 cellulitis and OM  - MSSA bacteremia on admission BCx; repeat BCx from 9/16 are NGTD  - Surgical pathology of right hallux: +acute Osteomyelitis, MSSA   - Podiatry/wound care follow up, recs appreciated  - ID recs appreciated - c/w cefazolin 2gm q8h  - Elevated leg to alleviate pain&swelling  - cardiology ([facility]) pre-op eval, recs appreciated  - S&S consulted as pt reported coughing especially with swallowing thin liquids; S&S rec dysphagia 2 mechanical soft, nectar consistency diet

## 2021-09-20 NOTE — PROGRESS NOTE ADULT - ASSESSMENT
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    Pre and Postop Cardiac Optimization    - H/o CAD. No active cardiac condition.   - Denies chest pain, palpitation, SOB, syncope, dizziness, lightheadedness, orthopnea, PND, CHAUDHARY and edema  - EKG demonstrates NSR @97 bpm LBBB . No acute ischemic changes noted.   - Chest X-ray negative for acute process  - Patient euvolemic on examination with no overt signs of heart failure or cardiac ischemia.   - Routine hemodynamic monitoring recommended.   - Cellulitis of right lower leg and Acute OM of right hallux  - MSSA bacteremia on admission BCx; repeat BCx from 9/16 are NGTD  - Surgical pathology of right hallux: +acute Osteomyelitis, staph aureus   - Podiatry/wound care follow up  - ID recs  -plan for OR on Monday for R. hallux amputation      Pulmonary embolism.   -  Bilateral PE in 7/2020  - heparin gtt - started 24 hrs after last dose of Xarelto,  -  DC 4-5hrs before surgery    Chronic systolic HF S/P ICD  - Echo as above revealing not well visualized LV, severe Mitral stenosis, left atrial enlargement   - ICD interrogation completed, no shocks recorded, ICD functioning battery life 2.5 years   - Continue Lasix and Entresto   - Tolerating Room air  - No signs of acute volume overload  - Strict I/Os, daily weights  - Monitor and replace electrolytes.  -Unclear why on sotalol , continue  need outpatient records     Anemia   -Likely anemia of chronic disease in the setting of pancreatic cancer  as per primary     - Pt has no active ischemia, decompensated heart failure, unstable arrythmia. Patient with  severe mitral stenosis which puts him at higher surgical  risk, however this is non modifiable at this time  Patient is optimized from cardiovascular standpoint to proceed with planned procedure with routine hemodynamic monitoring.     - Monitor and replete lytes, keep K>4, Mg>2.  - All other medical needs as per primary team.  - Other cardiovascular workup will depend on clinical course.  - Will continue to follow.    Asha Padilla, MS ANP, [facility]  Nurse Practitioner- Cardiology   Spectra #[phone]

## 2021-09-20 NOTE — PROGRESS NOTE ADULT - SUBJECTIVE AND OBJECTIVE BOX
[facility]  INFECTIOUS DISEASES   [address] [place] [address]  Tel: [phone]     Fax: [phone]  =======================================================    [id number]  JUANIS DE SANTIAGO     Follow up: right hallux cellulitis and bacteremia     No new complaint, swelling in first toe in right foot.   No more fever.   Had bone biopsy on 9/15, showing acute osteomyelitis.   For Amputation today.     PAST MEDICAL & SURGICAL HISTORY:  HTN (hypertension)  HLD (hyperlipidemia)  GERD (gastroesophageal reflux disease)  Cardiomyopathy  History of total hip replacement left  History of laminectomy  ICD (implantable cardiac defibrillator) in place  Benign testicular tumor  History of hip replacement    Social Hx: no smoking, ETOH or drugs     FAMILY HISTORY:  Family history of stroke (Mother)    Allergies  No Known Allergies    Antibiotics:  cefazolin      REVIEW OF SYSTEMS:  CONSTITUTIONAL:  No Fever or chills  HEENT:  No diplopia or blurred vision.  No sore throat or runny nose.  CARDIOVASCULAR:  No chest pain or SOB.  RESPIRATORY:  No cough, shortness of breath, PND or orthopnea.  GASTROINTESTINAL:  No nausea, vomiting or diarrhea.  GENITOURINARY:  No dysuria, frequency or urgency. No Blood in urine  MUSCULOSKELETAL: foot ulcer   SKIN:  No change in skin, hair or nails.  NEUROLOGIC:  No paresthesias, fasciculations, seizures or weakness.  PSYCHIATRIC:  No disorder of thought or mood.  ENDOCRINE:  No heat or cold intolerance, polyuria or polydipsia.  HEMATOLOGICAL:  No easy bruising or bleeding.     Physical Exam:  Vital Signs Last 24 Hrs  T(C): 36.8 (20 Sep 2021 15:00), Max: 36.8 (20 Sep 2021 03:30)  T(F): 98.2 (20 Sep 2021 15:00), Max: 98.2 (20 Sep 2021 03:30)  HR: 82 (20 Sep 2021 15:00) (75 - 92)  BP: 95/62 (20 Sep 2021 15:00) (95/62 - 127/71)  BP(mean): --  RR: 16 (20 Sep 2021 15:00) (16 - 18)  SpO2: 100% (20 Sep 2021 15:00) (94% - 100%)  GEN: NAD  HEENT: normocephalic and atraumatic. EOMI. PERRL.    NECK: Supple.  No lymphadenopathy   LUNGS: Clear to auscultation.  HEART: Regular rate and rhythm without murmur.  ABDOMEN: Soft, nontender, and nondistended.  Positive bowel sounds.    : No CVA tenderness  EXTREMITIES: R hallux with severe swelling and erythema. no fluctuation macerated skin, lower leg swelling and erythema improved significantly   NEUROLOGIC: grossly intact.  PSYCHIATRIC: Appropriate affect .  SKIN: No ulceration or induration present.    Labs:                        8.5    3.44  )-----------( 170      ( 20 Sep 2021 07:22 )             26.4     09-20    139  |  103  |  9   ----------------------------<  100<H>  4.0   |  29  |  0.60    Ca    8.3<L>      20 Sep 2021 07:22    TPro  6.6  /  Alb  2.7<L>  /  TBili  0.5  /  DBili  x   /  AST  19  /  ALT  14  /  AlkPhos  113  09-20    Culture - Blood (collected 09-16-21 @ 12:01)  Source: .Blood Blood-Peripheral    Culture - Blood (collected 09-16-21 @ 12:01)  Source: .Blood Blood-Peripheral    Culture - Tissue with Gram Stain (collected 09-16-21 @ 01:16)  Source: .Tissue right hallux bone culture  Gram Stain (09-16-21 @ 04:48):    Rare polymorphonuclear leukocytes seen per low power field    No organisms seen per oil power field  Organism: Staphylococcus aureus (09-18-21 @ 13:19)  Organism: Staphylococcus aureus (09-18-21 @ 13:19)    Sensitivities:      -  Ampicillin/Sulbactam: S <=8/4      -  Cefazolin: S <=4      -  Clindamycin: S <=0.25      -  Erythromycin: S <=0.25      -  Gentamicin: S 2 Should not be used as monotherapy      -  Oxacillin: S <=0.25      -  RIF- Rifampin: S <=1 Should not be used as monotherapy      -  Tetra/Doxy: S <=1      -  Trimethoprim/Sulfamethoxazole: S <=0.5/9.5      -  Vancomycin: S 2      Method Type: TYSON    Culture - Urine (collected 09-15-21 @ 04:15)  Source: Clean Catch Clean Catch (Midstream)  Final Report (09-16-21 @ 00:06):    <10,000 CFU/mL Normal Urogenital Juani    Culture - Blood (collected 09-15-21 @ 01:20)  Source: .Blood Blood-Peripheral  Gram Stain (09-16-21 @ 01:09):    Growth in aerobic bottle: Gram Positive Cocci in Clusters    Growth in anaerobic bottle: Gram Positive Cocci in Clusters  Final Report (09-17-21 @ 16:59):    Growth in aerobic and anaerobic bottles: Staphylococcus aureus    ***Blood Panel PCR results on this specimen are available    approximately 3 hours after the Gram stain result.***    Gram stain, PCR, and/or culture results may not always    correspond dueto difference in methodologies.    ************************************************************    This PCR assay was performed by multiplex PCR. This    Assay tests for 66 bacterial and resistance gene targets.    Please refer to the [facility] test directory    at [email or web address] for details.  Organism: Blood Culture PCR  Staphylococcus aureus (09-17-21 @ 16:59)  Organism: Staphylococcus aureus (09-17-21 @ 16:59)    Sensitivities:      -  Ampicillin/Sulbactam: S <=8/4      -  Cefazolin: S <=4      -  Clindamycin: R <=0.25 This isolate is presumed to be clindamycin resistant based on detection of inducible resistance. Clindamycin may still be effective in some patients.      -  Erythromycin: I 1      -  Gentamicin: S <=1 Should not be used as monotherapy      -  Oxacillin: S 0.5      -  RIF- Rifampin: S <=1 Should not be used as monotherapy      -  Tetra/Doxy: S <=1      -  Trimethoprim/Sulfamethoxazole: S <=0.5/9.5      -  Vancomycin: S 2      Method Type: TYSON  Organism: Blood Culture PCR (09-17-21 @ 16:59)    Sensitivities:      -  Staphylococcus aureus: Detec Any isolate of Staphylococcus aureus from a blood culture is NOT considered a contaminant.      Method Type: PCR    Culture - Blood (collected 09-15-21 @ 01:20)  Source: .Blood Blood-Peripheral  Gram Stain (09-15-21 @ 22:34):    Growth in aerobic bottle: Gram Positive Cocci in Clusters  Final Report (09-17-21 @ 17:41):    Growth in aerobic bottle: Staphylococcus aureus    See previous culture [id number]    WBC Count: 3.44 K/uL (09-20-21 @ 07:22)  WBC Count: 4.10 K/uL (09-19-21 @ 07:37)  WBC Count: 4.66 K/uL (09-19-21 @ 00:19)  WBC Count: 4.05 K/uL (09-18-21 @ 09:00)  WBC Count: 4.35 K/uL (09-17-21 @ 08:20)  WBC Count: 4.47 K/uL (09-16-21 @ 08:00)    Creatinine, Serum: 0.60 mg/dL (09-20-21 @ 07:22)  Creatinine, Serum: 0.78 mg/dL (09-19-21 @ 07:37)  Creatinine, Serum: 0.66 mg/dL (09-18-21 @ 09:00)  Creatinine, Serum: 0.60 mg/dL (09-17-21 @ 08:20)  Creatinine, Serum: 0.74 mg/dL (09-16-21 @ 08:00)    C-Reactive Protein, Serum: 168 mg/L (09-15-21 @ 07:50)    Ferritin, Serum: 585 ng/mL (09-15-21 @ 11:30)    Sedimentation Rate, Erythrocyte: 53 mm/hr (09-14-21 @ 20:17)    COVID-19 PCR: NotDetec (09-20-21 @ 12:59)  COVID-19 PCR: NotDetec (09-14-21 @ 20:17)    All imaging and other data have been reviewed.  < from: Xray Tibia + Fibula 2 Views, Right (09.14.21 @ 20:54) >  IMPRESSION: No acute chest finding. Ankle edema and degenerative changes in the foot again seen. Fairly advanced right knee degeneration also again noted.    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 Patient states he was advised to go the wound care center by his oncologist at [place] due to increased redness and edema to right lower leg.     ESR 53  Xray with degenerative changes   s/p debridement and bone biopsy on 9/15 with acute OM    R lower leg cellulitis, R hallux wound    Recommendations:   - Blood culture with MSSA on 9/15  - Blood culture NGTD on 9/16  - Wound culture MSSA  - Podiatry follow up noted for R 1st toe amputation   - Continue cefazolin 2gm q8h  - TTE negative, please ask cardiology to comment on possible CORBIN due to history TAVR (with his condition and active cancer)  - Will treat for 6 weeks, Will need a PICC     Will follow.    Jaren Bell MD  Division of Infectious Diseases   Cell [phone] between 8am and 6pm   After 6pm and weekends please call ID service at [phone].

## 2021-09-20 NOTE — BRIEF OPERATIVE NOTE - COMMENTS
patient tolerated procedure and anesthesia well and transferred to PACU with vital signs stable and neurovascular status intact to the right foot. patient to be transferred to floor when stable per anesthesia

## 2021-09-20 NOTE — PROGRESS NOTE ADULT - SUBJECTIVE AND OBJECTIVE BOX
[facility] -- Milo Thomas Grossman, Wachsman, Arian Gu Savella, Goodger: Office # [phone]    Follow Up:  CM ICD HTN Pre and Postop Cardiac Optimization     Subjective/Observations: Patient seen and examined. Patient awake, alert, resting comfortably in bed. No complaints of chest pain, dyspnea, palpitations or dizziness. Tolerating room air. Heparin iv gtt infusing    REVIEW OF SYSTEMS: All review of systems is negative for eye, ENT, GI, , allergic, dermatologic, musculoskeletal and neurologic except as described above    PAST MEDICAL & SURGICAL HISTORY:  HTN (hypertension)    HLD (hyperlipidemia)    GERD (gastroesophageal reflux disease)    Cardiomyopathy    History of total hip replacement  left    History of laminectomy    ICD (implantable cardiac defibrillator) in place    Benign testicular tumor    History of hip replacement        MEDICATIONS  (STANDING):  benzonatate 100 milliGRAM(s) Oral three times a day  ceFAZolin   IVPB 2000 milliGRAM(s) IV Intermittent every 8 hours  cholecalciferol 400 Unit(s) Oral daily  dorzolamide 2%/timolol 0.5% Ophthalmic Solution 1 Drop(s) Both EYES two times a day  DULoxetine 60 milliGRAM(s) Oral daily  furosemide    Tablet 20 milliGRAM(s) Oral daily  gabapentin 300 milliGRAM(s) Oral two times a day  ketorolac 0.5% Ophthalmic Solution 1 Drop(s) Both EYES daily  lactated ringers. 1000 milliLiter(s) (65 mL/Hr) IV Continuous <Continuous>  latanoprost 0.005% Ophthalmic Solution 1 Drop(s) Both EYES at bedtime  melatonin 5 milliGRAM(s) Oral at bedtime  multivitamin 1 Tablet(s) Oral daily  pancrelipase  (CREON 36,000 Lipase Units) 2 Capsule(s) Oral three times a day with meals  pantoprazole    Tablet 40 milliGRAM(s) Oral before breakfast  pyridoxine 100 milliGRAM(s) Oral daily  sacubitril 49 mG/valsartan 51 mG 1 Tablet(s) Oral two times a day  sotalol 40 milliGRAM(s) Oral two times a day  tamsulosin 0.4 milliGRAM(s) Oral at bedtime    MEDICATIONS  (PRN):  acetaminophen   Tablet .. 650 milliGRAM(s) Oral every 6 hours PRN Temp greater or equal to 38C (100.4F), Mild Pain (1 - 3)  ALPRAZolam 0.25 milliGRAM(s) Oral at bedtime PRN anxiety  guaiFENesin Oral Liquid (Sugar-Free) 200 milliGRAM(s) Oral every 6 hours PRN Cough  oxycodone    5 mG/acetaminophen 325 mG 1 Tablet(s) Oral every 6 hours PRN Moderate Pain (4 - 6)  prochlorperazine   Tablet 10 milliGRAM(s) Oral daily PRN nausea or vomiting    Allergies    No Known Allergies    Intolerances      Vital Signs Last 24 Hrs  T(C): 36.3 (20 Sep 2021 05:35), Max: 36.4 (19 Sep 2021 20:54)  T(F): 97.4 (20 Sep 2021 05:35), Max: 97.5 (19 Sep 2021 20:54)  HR: 89 (20 Sep 2021 05:35) (89 - 92)  BP: 107/66 (20 Sep 2021 05:35) (107/65 - 107/66)  BP(mean): --  RR: 17 (20 Sep 2021 05:35) (17 - 18)  SpO2: 95% (20 Sep 2021 05:35) (94% - 95%)  I&O's Summary    19 Sep 2021 07:01  -  20 Sep 2021 07:00  --------------------------------------------------------  IN: 386 mL / OUT: 1000 mL / NET: -614 mL          TELE: Not on telemetry   PHYSICAL EXAM:  Appearance: NAD, no distress, alert, Well developed   HEENT: Moist Mucous Membranes, Anicteric  Cardiovascular: Regular rate and rhythm, Normal S1 S2, No JVD, No murmurs, No rubs, gallops or clicks  Respiratory: Non-labored, Clear to auscultation, No rales, No rhonchi, No wheezing.   Gastrointestinal:  Soft, Non-tender, + BS  Neurologic: Non-focal  Skin: Warm and dry, No visible rashes or ulcers, No ecchymosis, No cyanosis Right foot intact dsg  Musculoskeletal: No clubbing, No cyanosis, No joint swelling/tenderness  Psychiatry: Mood & affect appropriate  Lymph: No peripheral edema.     LABS: All Labs Reviewed:                        8.5    3.44  )-----------( 170      ( 20 Sep 2021 07:22 )             26.4                         9.0    4.10  )-----------( 124      ( 19 Sep 2021 07:37 )             27.8                         8.5    4.66  )-----------( 107      ( 19 Sep 2021 00:19 )             26.8     20 Sep 2021 07:22    139    |  103    |  9      ----------------------------<  100    4.0     |  29     |  0.60   19 Sep 2021 07:37    136    |  103    |  10     ----------------------------<  102    4.3     |  29     |  0.78   18 Sep 2021 09:00    137    |  104    |  9      ----------------------------<  96     4.2     |  28     |  0.66     Ca    8.3        20 Sep 2021 07:22  Ca    8.1        19 Sep 2021 07:37  Ca    8.1        18 Sep 2021 09:00    TPro  6.6    /  Alb  2.7    /  TBili  0.5    /  DBili  x      /  AST  19     /  ALT  14     /  AlkPhos  113    20 Sep 2021 07:22  TPro  6.4    /  Alb  2.5    /  TBili  0.5    /  DBili  x      /  AST  20     /  ALT  17     /  AlkPhos  115    19 Sep 2021 07:37  TPro  6.3    /  Alb  2.5    /  TBili  0.7    /  DBili  x      /  AST  21     /  ALT  14     /  AlkPhos  119    18 Sep 2021 09:00    PT/INR - ( 19 Sep 2021 07:37 )   PT: 16.2 sec;   INR: 1.41 ratio    PTT - ( 20 Sep 2021 07:22 )  PTT:86.3 sec      12 Lead ECG:   Ventricular Rate 97 BPM  Atrial Rate 97 BPM  P-R Interval 130 ms  QRS Duration 122 ms  Q-T Interval 374 ms  QTC Calculation(Bazett) 474 ms  P Axis 43 degrees  R Axis 35 degrees  T Axis 173 degrees  Diagnosis Line Normal sinus rhythm  Left bundle branch block  Confirmed by JAI GU ([id number]) on 9/15/2021 10:39:54 AM (09-14-21 @ 20:29)    from: TTE Echo Complete w/o Contrast w/ Doppler (09.18.21 @ 08:10) >  EXAM:  ECHO TTE WO CON COMP W DOPP   PROCEDURE DATE:  09/18/2021    INTERPRETATION:  INDICATION: Infectious endocarditis  Sonographer KL  Blood Pressure 108/71    Height 185.4 cm     Weight 97.5 kg       BSA 2.22 sq m  Dimensions:  LA 4.3       Normal Values: 2.0 - 4.0 cm  Ao 3.5        Normal Values: 2.0 - 3.8 cm  SEPTUM 1.3       Normal Values: 0.6 - 1.2 cm  PWT 1.2       Normal Values: 0.6 - 1.1 cm  LVIDd 4.9         Normal Values: 3.0 - 5.6 cm  LVIDs 4.3         Normal Values: 1.8 - 4.0 cm  OBSERVATIONS:  Technically difficult and very limited study  Mitral Valve: Mitral annular calcification and calcified leaflets with restricted opening. Mean transmitral valve gradient equals 10.6 mmHg which is consistent with severe mitral stenosis.  Mild MR.  Aortic Valve/Aorta: Patient has a history of bioprosthetic aortic valve replacement. Peak transaortic valve gradient 21 mmHg with a mean transaortic valve gradient of 11 mmHg. This is probably normal in the setting of a prosthetic valve  Tricuspid Valve: Not well-visualized  Pulmonic Valve: Not well-visualized  Left Atrium: Enlarged  Right Atrium: Not well-visualized  Left Ventricle: The left ventricular endocardium is not well-visualized. Unable to accurately assess left ventricular function. If clinically indicated can consider further imaging with echo contrast  Right Ventricle: The right ventricle is not well-visualized. A device wire is noted within the right heart  Pericardium: no significant pericardial effusion.  Pulmonary/RV Pressure: estimated PA systolic pressure of 39 mmHg    IMPRESSION:  Technically difficult and very limited study  The left ventricular endocardium is not well-visualized. Unable to accurately assess left ventricular function. If clinically indicated can consider further imaging with echo contrast  The right ventricle is not well-visualized. A device wire is noted within the right heart  Patient has a history of bioprosthetic aortic valve replacement. Peak transaortic valvegradient 21 mmHg with a mean transaortic valve gradient of 11 mmHg. This is probably normal in the setting of a prosthetic valve  Calcified mitral valve with restricted opening. Valve gradients are consistent with severe mitral stenosis  Mild MR  Left atrial enlargement      < from: Xray Chest 1 View AP/PA (09.14.21 @ 20:53) >  EXAM:  XR FOOT COMP MIN 3 VIEWS RT                        EXAM:  XR ANKLE COMP MIN 3 VIEWS RT                        EXAM:  XR CHEST AP OR PA 1V                        EXAM:  XR TIB FIB AP LAT 2 VIEWS RT                        PROCEDURE DATE:  09/14/2021     INTERPRETATION:  Chest and right tib-fib, ankle, and foot. Patient has cellulitis of the right leg and wound care the foot. There is history of pancreatic cancer.  AP chest on September 14, 2021 at 8:51 PM.  Heart magnified by technique. Extensive is thoracolumbar hardware left-sided [id number]-Gunl remain.  Mild right thoracic curve again noted.  Lungs remain clear.  Present study shows a right MediPort new since June 12, 2020.    Right tib-fib. AP and lateral views. Arterial calcifications. No knee effusion.  Moderate to advanced knee degeneration. No bone destruction or fracture.  Right ankle. 3 views. Arterial calcification and ankle edema. No bone destruction or fracture.  Right foot. 3 views.  Partial subluxation of the right first MTP joint with associated degeneration again noted.  Scattered slight IP degeneration again seen. No fracture or radiographic bone destruction.  Right foot is similar to August 4.  IMPRESSION: No acute chest finding. Ankle edema and degenerative changes in the foot again seen. Fairly advanced right knee degeneration also again noted.  --- End of Report ---  JODI GURROLA MD; Attending Radiologist  This document has been electronically signed. Sep 15 2021 11:45AM  < end of copied text >

## 2021-09-20 NOTE — BRIEF OPERATIVE NOTE - SPECIMENS
1. Righthallux 2. [id number] 3. [id number] 4. Righthalluxbonecx 5.[id number]

## 2021-09-20 NOTE — PROGRESS NOTE ADULT - SUBJECTIVE AND OBJECTIVE BOX
Patient is a 80y old  Male who presents with a chief complaint of right hallux ulcer/cellulitis (20 Sep 2021 12:56)      Subjective:  INTERVAL HPI/OVERNIGHT EVENTS: Patient seen and examined at bedside. No overnight events occurred. Patient has no complaints at this time. Denies fevers, chills, headache, lightheadedness, chest pain, dyspnea, abdominal pain, n/v/d/c.    MEDICATIONS  (STANDING):  ceFAZolin   IVPB 2000 milliGRAM(s) IV Intermittent every 8 hours  cholecalciferol 400 Unit(s) Oral daily  dorzolamide 2%/timolol 0.5% Ophthalmic Solution 1 Drop(s) Both EYES two times a day  DULoxetine 60 milliGRAM(s) Oral daily  furosemide    Tablet 20 milliGRAM(s) Oral daily  gabapentin 300 milliGRAM(s) Oral two times a day  ketorolac 0.5% Ophthalmic Solution 1 Drop(s) Both EYES daily  lactated ringers. 1000 milliLiter(s) (65 mL/Hr) IV Continuous <Continuous>  latanoprost 0.005% Ophthalmic Solution 1 Drop(s) Both EYES at bedtime  melatonin 5 milliGRAM(s) Oral at bedtime  multivitamin 1 Tablet(s) Oral daily  pancrelipase  (CREON 36,000 Lipase Units) 2 Capsule(s) Oral three times a day with meals  pantoprazole    Tablet 40 milliGRAM(s) Oral before breakfast  pyridoxine 100 milliGRAM(s) Oral daily  sacubitril 49 mG/valsartan 51 mG 1 Tablet(s) Oral two times a day  sotalol 40 milliGRAM(s) Oral two times a day  tamsulosin 0.4 milliGRAM(s) Oral at bedtime    MEDICATIONS  (PRN):  acetaminophen   Tablet .. 650 milliGRAM(s) Oral every 6 hours PRN Temp greater or equal to 38C (100.4F), Mild Pain (1 - 3)  ALPRAZolam 0.25 milliGRAM(s) Oral at bedtime PRN anxiety  guaiFENesin Oral Liquid (Sugar-Free) 200 milliGRAM(s) Oral every 6 hours PRN Cough  oxycodone    5 mG/acetaminophen 325 mG 1 Tablet(s) Oral every 6 hours PRN Moderate Pain (4 - 6)  prochlorperazine   Tablet 10 milliGRAM(s) Oral daily PRN nausea or vomiting      Allergies    No Known Allergies    Intolerances        REVIEW OF SYSTEMS:  CONSTITUTIONAL: No fever or chills  HEENT:  No headache, no sore throat  RESPIRATORY: cough with swallowing thin liquids; denies wheezing or shortness of breath  CARDIOVASCULAR: No chest pain, palpitations  GASTROINTESTINAL: No abd pain, nausea, vomiting, or diarrhea  GENITOURINARY: No dysuria, frequency, or hematuria  NEUROLOGICAL: no focal weakness or dizziness  MUSCULOSKELETAL: +right foot pain (controlled) ; no myalgias     Objective:  Vital Signs Last 24 Hrs  T(C): 36.5 (20 Sep 2021 13:24), Max: 36.5 (20 Sep 2021 13:24)  T(F): 97.7 (20 Sep 2021 13:24), Max: 97.7 (20 Sep 2021 13:24)  HR: 75 (20 Sep 2021 13:24) (75 - 92)  BP: 127/71 (20 Sep 2021 13:24) (107/65 - 127/71)  BP(mean): --  RR: 18 (20 Sep 2021 13:24) (17 - 18)  SpO2: 96% (20 Sep 2021 13:24) (94% - 96%)    GENERAL: NAD  HEENT:  anicteric, moist mucous membranes  CHEST/LUNG:  CTA b/l, no rales, wheezes, or rhonchi  HEART:  RRR, S1, S2  ABDOMEN:  BS+, soft overall but firm in epigastric region, nontender, nondistended  EXTREMITIES: no cyanosis or calf tenderness  NERVOUS SYSTEM: answers questions and follows commands appropriately  MSK: R foot wrapped in ace bandage with gauze over hallux. Dressing C/D/I. Calves soft, nontender; hematoma on left forearm, chronic per patient    LABS:                        8.5    3.44  )-----------( 170      ( 20 Sep 2021 07:22 )             26.4     CBC Full  -  ( 20 Sep 2021 07:22 )  WBC Count : 3.44 K/uL  Hemoglobin : 8.5 g/dL  Hematocrit : 26.4 %  Platelet Count - Automated : 170 K/uL  Mean Cell Volume : 103.9 fl  Mean Cell Hemoglobin : 33.5 pg  Mean Cell Hemoglobin Concentration : 32.2 gm/dL  Auto Neutrophil # : 2.27 K/uL  Auto Lymphocyte # : 0.55 K/uL  Auto Monocyte # : 0.48 K/uL  Auto Eosinophil # : 0.07 K/uL  Auto Basophil # : 0.00 K/uL  Auto Neutrophil % : 65.0 %  Auto Lymphocyte % : 16.0 %  Auto Monocyte % : 14.0 %  Auto Eosinophil % : 2.0 %  Auto Basophil % : 0.0 %    20 Sep 2021 07:22    139    |  103    |  9      ----------------------------<  100    4.0     |  29     |  0.60     Ca    8.3        20 Sep 2021 07:22    TPro  6.6    /  Alb  2.7    /  TBili  0.5    /  DBili  x      /  AST  19     /  ALT  14     /  AlkPhos  113    20 Sep 2021 07:22    PT/INR - ( 19 Sep 2021 07:37 )   PT: 16.2 sec;   INR: 1.41 ratio         PTT - ( 20 Sep 2021 07:22 )  PTT:86.3 sec    CAPILLARY BLOOD GLUCOSE            Culture - Blood (collected 09-16-21 @ 12:01)  Source: .Blood Blood-Peripheral  Preliminary Report (09-17-21 @ 13:02):    No growth to date.    Culture - Blood (collected 09-16-21 @ 12:01)  Source: .Blood Blood-Peripheral  Preliminary Report (09-17-21 @ 13:02):    No growth to date.    Culture - Tissue with Gram Stain (collected 09-16-21 @ 01:16)  Source: .Tissue right hallux bone culture  Gram Stain (09-16-21 @ 04:48):    Rare polymorphonuclear leukocytes seen per low power field    No organisms seen per oil power field  Preliminary Report (09-17-21 @ 09:03):    Few Staphylococcus aureus  Organism: Staphylococcus aureus (09-18-21 @ 13:19)  Organism: Staphylococcus aureus (09-18-21 @ 13:19)      -  Ampicillin/Sulbactam: S <=8/4      -  Cefazolin: S <=4      -  Clindamycin: S <=0.25      -  Erythromycin: S <=0.25      -  Gentamicin: S 2 Should not be used as monotherapy      -  Oxacillin: S <=0.25      -  RIF- Rifampin: S <=1 Should not be used as monotherapy      -  Tetra/Doxy: S <=1      -  Trimethoprim/Sulfamethoxazole: S <=0.5/9.5      -  Vancomycin: S 2      Method Type: TYSON    Culture - Urine (collected 09-15-21 @ 04:15)  Source: Clean Catch Clean Catch (Midstream)  Final Report (09-16-21 @ 00:06):    <10,000 CFU/mL Normal Urogenital Juani    Culture - Blood (collected 09-15-21 @ 01:20)  Source: .Blood Blood-Peripheral  Gram Stain (09-16-21 @ 01:09):    Growth in aerobic bottle: Gram Positive Cocci in Clusters    Growth in anaerobic bottle: Gram Positive Cocci in Clusters  Final Report (09-17-21 @ 16:59):    Growth in aerobic and anaerobic bottles: Staphylococcus aureus    ***Blood Panel PCR results on this specimen are available    approximately 3 hours after the Gram stain result.***    Gram stain, PCR, and/or culture results may not always    correspond dueto difference in methodologies.    ************************************************************    This PCR assay was performed by multiplex PCR. This    Assay tests for 66 bacterial and resistance gene targets.    Please refer to the [facility] test directory    at [email or web address] for details.  Organism: Blood Culture PCR  Staphylococcus aureus (09-17-21 @ 16:59)  Organism: Staphylococcus aureus (09-17-21 @ 16:59)      -  Ampicillin/Sulbactam: S <=8/4      -  Cefazolin: S <=4      -  Clindamycin: R <=0.25 This isolate is presumed to be clindamycin resistant based on detection of inducible resistance. Clindamycin may still be effective in some patients.      -  Erythromycin: I 1      -  Gentamicin: S <=1 Should not be used as monotherapy      -  Oxacillin: S 0.5      -  RIF- Rifampin: S <=1 Should not be used as monotherapy      -  Tetra/Doxy: S <=1      -  Trimethoprim/Sulfamethoxazole: S <=0.5/9.5      -  Vancomycin: S 2      Method Type: TYSON  Organism: Blood Culture PCR (09-17-21 @ 16:59)      -  Staphylococcus aureus: Detec Any isolate of Staphylococcus aureus from a blood culture is NOT considered a contaminant.      Method Type: PCR    Culture - Blood (collected 09-15-21 @ 01:20)  Source: .Blood Blood-Peripheral  Gram Stain (09-15-21 @ 22:34):    Growth in aerobic bottle: Gram Positive Cocci in Clusters  Final Report (09-17-21 @ 17:41):    Growth in aerobic bottle: Staphylococcus aureus    See previous culture [id number]        RADIOLOGY & ADDITIONAL TESTS:    Personally reviewed.     Consultant(s) Notes Reviewed:  [x] YES  [ ] NO     Patient is a 80y old  Male who presents with a chief complaint of right hallux ulcer/cellulitis (20 Sep 2021 12:56)      Subjective:  INTERVAL HPI/OVERNIGHT EVENTS: Patient seen and examined at bedside. No overnight events occurred. Patient has no complaints at this time. Denies fevers, chills, headache, lightheadedness, chest pain, dyspnea, abdominal pain, n/v/d/c.    MEDICATIONS  (STANDING):  ceFAZolin   IVPB 2000 milliGRAM(s) IV Intermittent every 8 hours  cholecalciferol 400 Unit(s) Oral daily  dorzolamide 2%/timolol 0.5% Ophthalmic Solution 1 Drop(s) Both EYES two times a day  DULoxetine 60 milliGRAM(s) Oral daily  furosemide    Tablet 20 milliGRAM(s) Oral daily  gabapentin 300 milliGRAM(s) Oral two times a day  ketorolac 0.5% Ophthalmic Solution 1 Drop(s) Both EYES daily  lactated ringers. 1000 milliLiter(s) (65 mL/Hr) IV Continuous <Continuous>  latanoprost 0.005% Ophthalmic Solution 1 Drop(s) Both EYES at bedtime  melatonin 5 milliGRAM(s) Oral at bedtime  multivitamin 1 Tablet(s) Oral daily  pancrelipase  (CREON 36,000 Lipase Units) 2 Capsule(s) Oral three times a day with meals  pantoprazole    Tablet 40 milliGRAM(s) Oral before breakfast  pyridoxine 100 milliGRAM(s) Oral daily  sacubitril 49 mG/valsartan 51 mG 1 Tablet(s) Oral two times a day  sotalol 40 milliGRAM(s) Oral two times a day  tamsulosin 0.4 milliGRAM(s) Oral at bedtime    MEDICATIONS  (PRN):  acetaminophen   Tablet .. 650 milliGRAM(s) Oral every 6 hours PRN Temp greater or equal to 38C (100.4F), Mild Pain (1 - 3)  ALPRAZolam 0.25 milliGRAM(s) Oral at bedtime PRN anxiety  guaiFENesin Oral Liquid (Sugar-Free) 200 milliGRAM(s) Oral every 6 hours PRN Cough  oxycodone    5 mG/acetaminophen 325 mG 1 Tablet(s) Oral every 6 hours PRN Moderate Pain (4 - 6)  prochlorperazine   Tablet 10 milliGRAM(s) Oral daily PRN nausea or vomiting      Allergies    No Known Allergies    Intolerances        REVIEW OF SYSTEMS:  CONSTITUTIONAL: No fever or chills  HEENT:  No headache, no sore throat  RESPIRATORY: cough with swallowing thin liquids; denies wheezing or shortness of breath  CARDIOVASCULAR: No chest pain, palpitations  GASTROINTESTINAL: No abd pain, nausea, vomiting, or diarrhea  GENITOURINARY: No dysuria, frequency, or hematuria  NEUROLOGICAL: no focal weakness or dizziness  MUSCULOSKELETAL: +right foot pain (controlled) ; no myalgias     Objective:  Vital Signs Last 24 Hrs  T(C): 36.5 (20 Sep 2021 13:24), Max: 36.5 (20 Sep 2021 13:24)  T(F): 97.7 (20 Sep 2021 13:24), Max: 97.7 (20 Sep 2021 13:24)  HR: 75 (20 Sep 2021 13:24) (75 - 92)  BP: 127/71 (20 Sep 2021 13:24) (107/65 - 127/71)  BP(mean): --  RR: 18 (20 Sep 2021 13:24) (17 - 18)  SpO2: 96% (20 Sep 2021 13:24) (94% - 96%)    GENERAL: NAD  HEENT:  anicteric, moist mucous membranes  CHEST/LUNG:  CTA b/l, no rales, wheezes, or rhonchi  HEART:  RRR, S1, S2, +syst murmur  ABDOMEN:  BS+, soft overall but firm in epigastric region, nontender, nondistended  EXTREMITIES: no cyanosis or calf tenderness  NERVOUS SYSTEM: answers questions and follows commands appropriately  MSK: R foot wrapped in ace bandage with gauze over hallux. Dressing C/D/I. Calves soft, nontender, chronic swelling of posterior left elbow, soft, non tender (chronic per pt)    LABS:                        8.5    3.44  )-----------( 170      ( 20 Sep 2021 07:22 )             26.4     CBC Full  -  ( 20 Sep 2021 07:22 )  WBC Count : 3.44 K/uL  Hemoglobin : 8.5 g/dL  Hematocrit : 26.4 %  Platelet Count - Automated : 170 K/uL  Mean Cell Volume : 103.9 fl  Mean Cell Hemoglobin : 33.5 pg  Mean Cell Hemoglobin Concentration : 32.2 gm/dL  Auto Neutrophil # : 2.27 K/uL  Auto Lymphocyte # : 0.55 K/uL  Auto Monocyte # : 0.48 K/uL  Auto Eosinophil # : 0.07 K/uL  Auto Basophil # : 0.00 K/uL  Auto Neutrophil % : 65.0 %  Auto Lymphocyte % : 16.0 %  Auto Monocyte % : 14.0 %  Auto Eosinophil % : 2.0 %  Auto Basophil % : 0.0 %    20 Sep 2021 07:22    139    |  103    |  9      ----------------------------<  100    4.0     |  29     |  0.60     Ca    8.3        20 Sep 2021 07:22    TPro  6.6    /  Alb  2.7    /  TBili  0.5    /  DBili  x      /  AST  19     /  ALT  14     /  AlkPhos  113    20 Sep 2021 07:22    PT/INR - ( 19 Sep 2021 07:37 )   PT: 16.2 sec;   INR: 1.41 ratio         PTT - ( 20 Sep 2021 07:22 )  PTT:86.3 sec    CAPILLARY BLOOD GLUCOSE            Culture - Blood (collected 09-16-21 @ 12:01)  Source: .Blood Blood-Peripheral  Preliminary Report (09-17-21 @ 13:02):    No growth to date.    Culture - Blood (collected 09-16-21 @ 12:01)  Source: .Blood Blood-Peripheral  Preliminary Report (09-17-21 @ 13:02):    No growth to date.    Culture - Tissue with Gram Stain (collected 09-16-21 @ 01:16)  Source: .Tissue right hallux bone culture  Gram Stain (09-16-21 @ 04:48):    Rare polymorphonuclear leukocytes seen per low power field    No organisms seen per oil power field  Preliminary Report (09-17-21 @ 09:03):    Few Staphylococcus aureus  Organism: Staphylococcus aureus (09-18-21 @ 13:19)  Organism: Staphylococcus aureus (09-18-21 @ 13:19)      -  Ampicillin/Sulbactam: S <=8/4      -  Cefazolin: S <=4      -  Clindamycin: S <=0.25      -  Erythromycin: S <=0.25      -  Gentamicin: S 2 Should not be used as monotherapy      -  Oxacillin: S <=0.25      -  RIF- Rifampin: S <=1 Should not be used as monotherapy      -  Tetra/Doxy: S <=1      -  Trimethoprim/Sulfamethoxazole: S <=0.5/9.5      -  Vancomycin: S 2      Method Type: TYSON    Culture - Urine (collected 09-15-21 @ 04:15)  Source: Clean Catch Clean Catch (Midstream)  Final Report (09-16-21 @ 00:06):    <10,000 CFU/mL Normal Urogenital Juani    Culture - Blood (collected 09-15-21 @ 01:20)  Source: .Blood Blood-Peripheral  Gram Stain (09-16-21 @ 01:09):    Growth in aerobic bottle: Gram Positive Cocci in Clusters    Growth in anaerobic bottle: Gram Positive Cocci in Clusters  Final Report (09-17-21 @ 16:59):    Growth in aerobic and anaerobic bottles: Staphylococcus aureus    ***Blood Panel PCR results on this specimen are available    approximately 3 hours after the Gram stain result.***    Gram stain, PCR, and/or culture results may not always    correspond dueto difference in methodologies.    ************************************************************    This PCR assay was performed by multiplex PCR. This    Assay tests for 66 bacterial and resistance gene targets.    Please refer to the [facility] test directory    at [email or web address] for details.  Organism: Blood Culture PCR  Staphylococcus aureus (09-17-21 @ 16:59)  Organism: Staphylococcus aureus (09-17-21 @ 16:59)      -  Ampicillin/Sulbactam: S <=8/4      -  Cefazolin: S <=4      -  Clindamycin: R <=0.25 This isolate is presumed to be clindamycin resistant based on detection of inducible resistance. Clindamycin may still be effective in some patients.      -  Erythromycin: I 1      -  Gentamicin: S <=1 Should not be used as monotherapy      -  Oxacillin: S 0.5      -  RIF- Rifampin: S <=1 Should not be used as monotherapy      -  Tetra/Doxy: S <=1      -  Trimethoprim/Sulfamethoxazole: S <=0.5/9.5      -  Vancomycin: S 2      Method Type: TYSON  Organism: Blood Culture PCR (09-17-21 @ 16:59)      -  Staphylococcus aureus: Detec Any isolate of Staphylococcus aureus from a blood culture is NOT considered a contaminant.      Method Type: PCR    Culture - Blood (collected 09-15-21 @ 01:20)  Source: .Blood Blood-Peripheral  Gram Stain (09-15-21 @ 22:34):    Growth in aerobic bottle: Gram Positive Cocci in Clusters  Final Report (09-17-21 @ 17:41):    Growth in aerobic bottle: Staphylococcus aureus    See previous culture [id number]        RADIOLOGY & ADDITIONAL TESTS:    Personally reviewed.     Consultant(s) Notes Reviewed:  [x] YES  [ ] NO

## 2021-09-20 NOTE — BRIEF OPERATIVE NOTE - NSICDXBRIEFPROCEDURE_GEN_ALL_CORE_FT
PROCEDURES:  Partial amputation of first ray of right foot by open approach 20-Sep-2021 16:33:13  Meeta Villagran

## 2021-09-20 NOTE — PROGRESS NOTE ADULT - ASSESSMENT
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    #Cellulitis of right lower leg and Acute OM of right hallux  ·  Plan: Patient presents with RLE erythema, edema, and pain likely 2/2 cellulitis and OM  - MSSA bacteremia on admission BCx; repeat BCx from 9/16 are NGTD  - Surgical pathology of right hallux: +acute Osteomyelitis, MSSA   - Podiatry/wound care follow up, recs appreciated  - ID recs appreciated - c/w cefazolin 2gm q8h  - Wound care as per Podiatry -   - OR today for R. hallux amputation- case is an add-on, no set time predefined, but likely later in the afternoon  - Pain control  - Elevated leg to alleviate pain&swelling  - NPO after MN  - cardiology ([facility]) pre-op eval, recs appreciated  - Patient is a high-risk patient for an intermediate risk procedure and is medically optimized for the procedure     #Injury of right toe.   ·  Plan: Pt follows Dr. Felipe/Rene for R hallux hammertoe dorsal stage 3 pressure injury  - Fall precautions  - Podiatry (Dr. López) consulted, recs appreciated  - now plan for OR on Monday for R. hallux amputation given OM and bacteremia    #HTN (hypertension).   - C/w Lasix 20 mg PO qd, Entresto 1 tab BID, Sotalol 40 mg PO BID with hold parameters  - BP low normal, continue to monitor off Bystolic ( at home on 5mg PO qd ), unless cardio recs to start metoprolol interchange  - Monitor VS    #Pulmonary embolism.   ·  Bilateral PE in 7/2020  - on xarelto at home, switched to heparin gtt on 9/18 in anticipation of OR  - will need to D/C heparin drip 4-6 hours prior to surgery. Patient is an add-on case for tomorrow and will likely be in the late afternoon/evening; as such, will speak to podiatry/OR scheduling again in the morning to determine when hep gtt should be stopped  - Patient is scheduled for Whipple's procedure on 9/23/2021 and was advised to stop Xarelto on 9/20 and start Heparin.    #CHF (congestive heart failure).   ·  Plan: CHF (likely chronic systolic CHF)  - On Lasix 20 mg PO qd, Entresto 1 tab BID, Sotalol 40 mg PO BID with hold parameters  - Tolerating Room air  - no signs of acute volume overload  - Strict I/Os, daily weights  - Monitor and replace electrolytes.  - TTE very limited study, unable to assess LV or RV function, severe MS, bioAVR ; cardio plan to redo TTE with Definity contrast for potentially better visualization of the endocardium, but per Dr. Charlton, the repeat TTE is not needed for pt to proceed with hallux amputation    #Hx of arrhythmia  - discussed with pt and his children the reason for his ICD placement  - they stated that >20 years ago, the pt had a very fast HR in the setting of a viral infection and the ICD was placed  - pt/family said he did not have a cardiac arrest; thus suspect pt had sustained VT with pulse and had ICD placement  - cardio called company to interrogate ICD   - c/w sotalol     #Anemia.   ·  Plan: Likely anemia of chronic disease in the setting of pancreatic cancer  - H/H 9.8/30.0 on admission, baseline hemoglobin unknown  - Currently hemodynamically stable, monitor for signs and symptoms of active bleeding  - Consider transfusion for hemoglobin <8  - continue to monitor thrombocytopenia (improving to near normal now)    #CAD (coronary artery disease).   ·  Plan: Chronic, s/p cardiac stents x2 (15 years ago)  - pt on Xarelto at home (hx of PE), but not on ASA or statin ; Xarelto currently on hold    #Pancreatic cancer.   ·  Plan: Chronic, diagnosed in March 2021 on chemotherapy (does not recall name of medication)  - Continue home medication Pancrelipase 36,000 2 tabs PO TID with meals  - Pt scheduled for Whipple's procedure on 9/23/2021 with Dr. Shon Murphy at [place]. Dr. Murphy aware that patient is currently hospitalized and surgery will likely be postponed.     #Dysphagia  - patient with complaints of coughing/choking on food on 9/18  - S/S consulted, recs dysphagia 2 mechanical soft, nectar consistency    #Need for prophylactic measure.   ·  VTE ppx: - on hep gtt for A/C - HOLD FOR OR

## 2021-09-20 NOTE — CHART NOTE - NSCHARTNOTEFT_GEN_A_CORE
Called by RN for patient with bleeding from right foot. Patient is s/p partial amputation of first ray of right foot today (9/20) with Podiatry. Patient seen and examined at bedside. Patient states that he went up to use the bathroom and put weight on his foot, and then noted that blood soaked through his dressings and ace wraps with blood streaks on floor. Incision is well-approximated with no active bleeding appreciated. Reinforced dressing with ABD pads, gauze and ace wrap. Instructed patient to be partial weight bearing to the right lower extremity in the immediate post op period. Will continue to monitor.     T(C): 36.4 (09-20-21 @ 17:31), Max: 36.8 (09-20-21 @ 03:30)  HR: 84 (09-20-21 @ 17:31) (75 - 92)  BP: 121/63 (09-20-21 @ 17:31) (95/62 - 133/70)  RR: 16 (09-20-21 @ 17:31) (13 - 18)  SpO2: 96% (09-20-21 @ 17:31) (94% - 100%)  Wt(kg): -- Called by RN for patient with bleeding from right foot. Patient is s/p partial amputation of first ray of right foot today (9/20) with Podiatry. Patient seen and examined at bedside. Patient states that he went up to use the bathroom and put weight on his foot, and then noted that blood soaked through his dressings and ace wraps with blood streaks on floor. Incision is well-approximated with no active bleeding appreciated. Reinforced dressing with ABD pads, gauze and ace wrap. Instructed patient to be partial weight bearing to the right lower extremity in the immediate post op period. Will continue to monitor.     T(C): 36.4 (09-20-21 @ 17:31), Max: 36.8 (09-20-21 @ 03:30)  HR: 84 (09-20-21 @ 17:31) (75 - 92)  BP: 121/63 (09-20-21 @ 17:31) (95/62 - 133/70)  RR: 16 (09-20-21 @ 17:31) (13 - 18)  SpO2: 96% (09-20-21 @ 17:31) (94% - 100%)  Wt(kg): --    Caity Huizar DO   PGY-2  Spectra #[id number] Called by RN for patient with bleeding from right foot. Patient is s/p partial amputation of first ray of right foot today (9/20) with Podiatry. Patient seen and examined at bedside. Patient states that he went up to use the bathroom and put weight on his foot, and then noted that blood soaked through his dressings and ace wraps with blood streaks on floor. Incision is well-approximated with no active bleeding appreciated. Reinforced dressing with ABD pads, gauze and ace wrap. Instructed patient to be partial weight bearing to the right lower extremity in the immediate post op period. Will continue to monitor.     T(C): 36.4 (09-20-21 @ 17:31), Max: 36.8 (09-20-21 @ 03:30)  HR: 84 (09-20-21 @ 17:31) (75 - 92)  BP: 121/63 (09-20-21 @ 17:31) (95/62 - 133/70)  RR: 16 (09-20-21 @ 17:31) (13 - 18)  SpO2: 96% (09-20-21 @ 17:31) (94% - 100%)  Wt(kg): --    Caity Huizar DO   PGY-2  Spectra #[phone]      ADDENDUM 230  Spoke with feliberto Forrester to restart heparin drip tonight from podiatry prospective.

## 2021-09-21 ENCOUNTER — APPOINTMENT (OUTPATIENT)
Dept: WOUND CARE | Facility: HOSPITAL | Age: 81
End: 2021-09-21

## 2021-09-21 LAB
ANION GAP SERPL CALC-SCNC: 6 MMOL/L — SIGNIFICANT CHANGE UP (ref 5–17)
APTT BLD: 117.9 SEC — HIGH (ref 27.5–35.5)
APTT BLD: 152.2 SEC — CRITICAL HIGH (ref 27.5–35.5)
BUN SERPL-MCNC: 8 MG/DL — SIGNIFICANT CHANGE UP (ref 7–23)
CALCIUM SERPL-MCNC: 8.6 MG/DL — SIGNIFICANT CHANGE UP (ref 8.5–10.1)
CHLORIDE SERPL-SCNC: 101 MMOL/L — SIGNIFICANT CHANGE UP (ref 96–108)
CO2 SERPL-SCNC: 28 MMOL/L — SIGNIFICANT CHANGE UP (ref 22–31)
CREAT SERPL-MCNC: 0.69 MG/DL — SIGNIFICANT CHANGE UP (ref 0.5–1.3)
CULTURE RESULTS: SIGNIFICANT CHANGE UP
CULTURE RESULTS: SIGNIFICANT CHANGE UP
GLUCOSE SERPL-MCNC: 106 MG/DL — HIGH (ref 70–99)
HCT VFR BLD CALC: 27 % — LOW (ref 39–50)
HGB BLD-MCNC: 8.6 G/DL — LOW (ref 13–17)
MCHC RBC-ENTMCNC: 31.9 GM/DL — LOW (ref 32–36)
MCHC RBC-ENTMCNC: 33.9 PG — SIGNIFICANT CHANGE UP (ref 27–34)
MCV RBC AUTO: 106.3 FL — HIGH (ref 80–100)
NRBC # BLD: 0 /100 WBCS — SIGNIFICANT CHANGE UP (ref 0–0)
PLATELET # BLD AUTO: 188 K/UL — SIGNIFICANT CHANGE UP (ref 150–400)
POTASSIUM SERPL-MCNC: 4 MMOL/L — SIGNIFICANT CHANGE UP (ref 3.5–5.3)
POTASSIUM SERPL-SCNC: 4 MMOL/L — SIGNIFICANT CHANGE UP (ref 3.5–5.3)
RBC # BLD: 2.54 M/UL — LOW (ref 4.2–5.8)
RBC # FLD: 15.6 % — HIGH (ref 10.3–14.5)
SODIUM SERPL-SCNC: 135 MMOL/L — SIGNIFICANT CHANGE UP (ref 135–145)
SPECIMEN SOURCE: SIGNIFICANT CHANGE UP
SPECIMEN SOURCE: SIGNIFICANT CHANGE UP
WBC # BLD: 4.44 K/UL — SIGNIFICANT CHANGE UP (ref 3.8–10.5)
WBC # FLD AUTO: 4.44 K/UL — SIGNIFICANT CHANGE UP (ref 3.8–10.5)

## 2021-09-21 PROCEDURE — 99232 SBSQ HOSP IP/OBS MODERATE 35: CPT

## 2021-09-21 PROCEDURE — 99233 SBSQ HOSP IP/OBS HIGH 50: CPT | Mod: GC

## 2021-09-21 PROCEDURE — 99024 POSTOP FOLLOW-UP VISIT: CPT

## 2021-09-21 RX ORDER — RIVAROXABAN 15 MG-20MG
20 KIT ORAL
Refills: 0 | Status: DISCONTINUED | OUTPATIENT
Start: 2021-09-21 | End: 2021-09-24

## 2021-09-21 RX ADMIN — Medication 100 MILLIGRAM(S): at 12:11

## 2021-09-21 RX ADMIN — Medication 400 UNIT(S): at 12:11

## 2021-09-21 RX ADMIN — DORZOLAMIDE HYDROCHLORIDE TIMOLOL MALEATE 1 DROP(S): 20; 5 SOLUTION/ DROPS OPHTHALMIC at 17:37

## 2021-09-21 RX ADMIN — DULOXETINE HYDROCHLORIDE 60 MILLIGRAM(S): 30 CAPSULE, DELAYED RELEASE ORAL at 12:11

## 2021-09-21 RX ADMIN — OXYCODONE AND ACETAMINOPHEN 1 TABLET(S): 5; 325 TABLET ORAL at 22:39

## 2021-09-21 RX ADMIN — Medication 2 CAPSULE(S): at 17:37

## 2021-09-21 RX ADMIN — PANTOPRAZOLE SODIUM 40 MILLIGRAM(S): 20 TABLET, DELAYED RELEASE ORAL at 06:20

## 2021-09-21 RX ADMIN — HEPARIN SODIUM 0 UNIT(S)/HR: 5000 INJECTION INTRAVENOUS; SUBCUTANEOUS at 08:30

## 2021-09-21 RX ADMIN — Medication 1 DROP(S): at 12:11

## 2021-09-21 RX ADMIN — Medication 40 MILLIGRAM(S): at 17:43

## 2021-09-21 RX ADMIN — Medication 100 MILLIGRAM(S): at 22:04

## 2021-09-21 RX ADMIN — RIVAROXABAN 20 MILLIGRAM(S): KIT at 17:37

## 2021-09-21 RX ADMIN — Medication 5 MILLIGRAM(S): at 22:04

## 2021-09-21 RX ADMIN — HEPARIN SODIUM 1500 UNIT(S)/HR: 5000 INJECTION INTRAVENOUS; SUBCUTANEOUS at 09:35

## 2021-09-21 RX ADMIN — Medication 100 MILLIGRAM(S): at 13:20

## 2021-09-21 RX ADMIN — OXYCODONE AND ACETAMINOPHEN 1 TABLET(S): 5; 325 TABLET ORAL at 09:13

## 2021-09-21 RX ADMIN — OXYCODONE AND ACETAMINOPHEN 1 TABLET(S): 5; 325 TABLET ORAL at 10:10

## 2021-09-21 RX ADMIN — Medication 2 CAPSULE(S): at 08:55

## 2021-09-21 RX ADMIN — Medication 0.25 MILLIGRAM(S): at 23:04

## 2021-09-21 RX ADMIN — OXYCODONE AND ACETAMINOPHEN 1 TABLET(S): 5; 325 TABLET ORAL at 22:09

## 2021-09-21 RX ADMIN — TAMSULOSIN HYDROCHLORIDE 0.4 MILLIGRAM(S): 0.4 CAPSULE ORAL at 22:04

## 2021-09-21 RX ADMIN — DORZOLAMIDE HYDROCHLORIDE TIMOLOL MALEATE 1 DROP(S): 20; 5 SOLUTION/ DROPS OPHTHALMIC at 06:20

## 2021-09-21 RX ADMIN — Medication 2 CAPSULE(S): at 12:57

## 2021-09-21 RX ADMIN — LATANOPROST 1 DROP(S): 0.05 SOLUTION/ DROPS OPHTHALMIC; TOPICAL at 22:04

## 2021-09-21 RX ADMIN — GABAPENTIN 300 MILLIGRAM(S): 400 CAPSULE ORAL at 17:39

## 2021-09-21 RX ADMIN — HEPARIN SODIUM 1800 UNIT(S)/HR: 5000 INJECTION INTRAVENOUS; SUBCUTANEOUS at 00:55

## 2021-09-21 RX ADMIN — Medication 40 MILLIGRAM(S): at 06:19

## 2021-09-21 RX ADMIN — Medication 100 MILLIGRAM(S): at 06:34

## 2021-09-21 RX ADMIN — Medication 20 MILLIGRAM(S): at 06:20

## 2021-09-21 RX ADMIN — Medication 1 TABLET(S): at 12:11

## 2021-09-21 RX ADMIN — GABAPENTIN 300 MILLIGRAM(S): 400 CAPSULE ORAL at 06:18

## 2021-09-21 NOTE — DIETITIAN INITIAL EVALUATION ADULT. - PROBLEM SELECTOR PLAN 3
Chronic, soft BP noted  - C/w Lasix 20 mg PO qd, Entresto 1 tab BID, Sotalol 40 mg PO BID with hold parameters  - hold Bystolic 5 mg PO qd for now in setting of soft BP  - Monitor routine hemodynamics

## 2021-09-21 NOTE — PROGRESS NOTE ADULT - PROBLEM SELECTOR PLAN 1
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 López, DOS 9/2021- primarily closed   - patient seen and evaluated  - surgical dressing taken off with care and to patient's tolerance  - surgical site gently cleansed with normal saline and area pat dry with sterile gauze  - surgical site dressed with surgicel Nu Knit and DSD, secured with ACE bandage   - Follow up with the OR pathologies and cultures  - Follow up with infectious disease recommendations  - Wound Care Instructions below   - Podiatry team will continue to follow patient while in house  WOUND CARE INSTRUCTIONS   1. Keep dressing clean, dry and intact at all times until first follow up appointment   2.  monitor for any signs of infection.  3. Patient is to follow up [facility] with Dr. Felipe or Dr. López within 1 week upon discharge.

## 2021-09-21 NOTE — DIETITIAN INITIAL EVALUATION ADULT. - PROBLEM SELECTOR PLAN 10
VTE ppx: Continue home Xarelto 20 mg PO qd    11. Hyponatremia: Na 132 on admission, f/u AM BMP  12. BPH: Continue home Flomax 0.4 mg PO qhs  13. Macular degeneration: Continue home Dorzolamide/Timolol, Ketorolac, Latanoprost  14. GERD: Continue therapeutic interchange for home Omeprazole 20 mg PO qd - Protonix 40 mg PO qd  15. Neuropathy: Continue home Gabapentin 300 mg PO BID    IMPROVE VTE Individual Risk Assessment        RISK                                                          Points  [ x ] Previous VTE                                                3  [  ] Thrombophilia                                             2  [  ] Lower limb paralysis                                   2        (unable to hold up >15 seconds)    [ x ] Current Cancer                                             2         (within 6 months)  [  ] Immobilization > 24 hrs                              1  [  ] ICU/CCU stay > 24 hours                             1  [ x ] Age > 60                                                         1  IMPROVE VTE Score: 6

## 2021-09-21 NOTE — DIETITIAN INITIAL EVALUATION ADULT. - PROBLEM SELECTOR PLAN 5
Chronic systolic CHF s/p AICD  - Admit to [facility]  - Given IV NS 2.5 bolus x1 in ED  - On Lasix 20 mg PO qd, Entresto 1 tab BID, Sotalol 40 mg PO BID, Bystolic 5 mg PO qd with hold parameters  - Tolerating RA  - Strict I/Os, daily weights  - Monitor and replace electrolytes

## 2021-09-21 NOTE — PROGRESS NOTE ADULT - ASSESSMENT
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    #Cellulitis of right lower leg and Acute OM of right hallux  - MSSA bacteremia on admission BCx; repeat BCx from 9/16 are NGTD  - Surgical pathology of right hallux: +acute Osteomyelitis, MSSA   - Podiatry/wound care following (Dr. 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   - C/w Lasix 20 mg PO qd, Entresto 1 tab BID, Sotalol 40 mg PO BID with hold parameters  - BP low normal, continue to monitor off Bystolic ( at home on 5mg PO qd ), unless cardio recs to start metoprolol interchange  - Monitor VS    #Pulmonary embolism.   -  Bilateral PE in 7/2020  - on xarelto at home, switched to heparin gtt on 9/18 in anticipation of OR  - Patient is scheduled for Whipple's procedure on 9/23/2021 and was advised to stop Xarelto on 9/20 and start Heparin.    #CHF, chronic  - On Lasix 20 mg PO qd, Entresto 1 tab BID, Sotalol 40 mg PO BID with hold parameters  - Tolerating Room air  - no signs of acute volume overload  - Strict I/Os, daily weights  - Monitor and replace electrolytes.  - TTE very limited study, unable to assess LV or RV function, severe MS, bioAVR ; cardio plan to redo TTE with Definity contrast for potentially better visualization of the endocardium, but per Dr. Charlton, the repeat TTE is not needed for pt to proceed with hallux amputation    #Hx of arrhythmia  - discussed with pt and his children the reason for his ICD placement  - they stated that >20 years ago, the pt had a very fast HR in the setting of a viral infection and the ICD was placed  - pt/family said he did not have a cardiac arrest; thus suspect pt had sustained VT with pulse and had ICD placement  - cardio called company to interrogate ICD   - c/w sotalol     #Anemia (likely 2/2 chronic disease in the setting of pancreatic cancer)  - H/H 9.8/30.0 on admission, baseline hemoglobin unknown  - Currently hemodynamically stable, monitor for signs and symptoms of active bleeding  - Consider transfusion for hemoglobin <8  - continue to monitor thrombocytopenia (improving to near normal now)    #CAD (coronary artery disease).   - Chronic, s/p cardiac stents x2 (15 years ago)  - pt on Xarelto at home (hx of PE), but not on ASA or statin ; Xarelto currently on hold, continue w/ heparin gtt    #Pancreatic cancer.   - diagnosed in March 2021. On chemotherapy (does not recall name of medication)  - Continue home medication Pancrelipase 36,000 2 tabs PO TID with meals  - Pt scheduled for Whipple's procedure on 9/23/2021 with Dr. Shon Murphy at [place]. Dr. Murphy aware that patient is currently hospitalized and surgery will likely be postponed.     #Dysphagia  - patient with complaints of coughing/choking on food on 9/18  - S/S consulted, recs dysphagia 2 mechanical soft, nectar consistency    #DVT ppx  - heparin gtt

## 2021-09-21 NOTE — DIETITIAN INITIAL EVALUATION ADULT. - OTHER INFO
81 y/o male adm with cellulitis. PMH cardiomyopathy, GERD, HLD, HTN, pancreatic Ca, HF, CAD s/p stents. Pt scheduled for whipple procedure at [facility] on 9/23 (most likely postponed due to this hospitalization). Pt s/p OR 9/20 for right partial ray resection. SLP eval completed on 9/19 which rx dysphagia 2 mech soft diet with NT liquids. Pt visited at bedside this am. Pt reports, appetite is good, tolerating meals. Pt concerned re: diet consistency and feels he is able to tolerate Regular food with thin liquids. Explained to pt that [facility]. Pt is compliant with diet restrictions, however would like diet advanced. Call placed out to SLP for possible f/u. Last BM 9/20.

## 2021-09-21 NOTE — DIETITIAN INITIAL EVALUATION ADULT. - PROBLEM SELECTOR PLAN 7
Likely anemia of chronic disease in the setting of pancreatic cancer  - H/H 9.8/30.0 on admission, baseline hemoglobin unknown  - Currently hemodynamically stable, monitor for signs and symptoms of active bleeding  - Consider transfusion for hemoglobin <8  - Maintain active type and screen  - F/u iron panel: iron, ferritin, TIBC, transferrin  - F/u folate, vitamin B12  - F/u AM CBC

## 2021-09-21 NOTE — PROVIDER CONTACT NOTE (MEDICATION) - SITUATION
Pt was on heparin prior to surgery but was not restarted on it  Contact MD to find out if pt needs to be restarted or will be getting other anticoagulant  At this time MD looking further into it

## 2021-09-21 NOTE — PROGRESS NOTE ADULT - TIME BILLING
Pt seen + examined. Case discussed with resident. 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    - MSSA bacteremia on admission BCx; repeat BCx from 9/16 are NGTD  - Podiatry/wound care follow up, recs appreciated  - Elevated leg to alleviate pain&swelling  - HOLD PT eval today 9/21; given pt had mild bleed last night post surgery due to not elevating leg. Consider pt eval 9/22.    - Per podiatry doc who spoke to pts surgeon at [facility] (was planned for whipple procedure for 9/23), surgeon plans to postpone surgery for now given Osteo and recent surgery. Will dc heparin drip and resume xarelto (home med).     - S&S consulted as pt reported coughing especially with swallowing thin liquids; S&S rec dysphagia 2 mechanical soft, nectar consistency diet. Today, 9.21, pt complained of not liking the diet and refuses to eat the dysph 2 solids. Discussed with pt at bedside. He is willing to do nectar liquids but wants more solid diet. I called S&S to re-evaluate (will be done likely 9/22). For now, pt wants diet advanced. Risks involved described to him and he verbalized understanding. Will keep on dyph3/nectar pending S&S re-evaluation. Pt seen + examined. Case discussed with resident. 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    - MSSA bacteremia on admission BCx; repeat BCx from 9/16 are NGTD.   TTE done 9/18. Given hx of TAVR and MSSA bacteremia (bcx 9/15 x 2), discussed with cardiologist and Infectious disease consultants. Pt should get a CORBIN given above risks. Cardio aware (Dr Judd) and will work on scheduling this for patient.     - Podiatry/wound care follow up, recs appreciated  - Elevated leg to alleviate pain&swelling  - HOLD PT eval today 9/21; given pt had mild bleed last night post surgery due to not elevating leg. Consider pt eval 9/22.    - Per podiatry doc who spoke to pts surgeon at [facility] (was planned for whipple procedure for 9/23), surgeon plans to postpone surgery for now given Osteo and recent surgery. Will dc heparin drip and resume xarelto (home med).     - S&S consulted as pt reported coughing especially with swallowing thin liquids; S&S rec dysphagia 2 mechanical soft, nectar consistency diet. Today, 9.21, pt complained of not liking the diet and refuses to eat the dysph 2 solids. Discussed with pt at bedside. He is willing to do nectar liquids but wants more solid diet. I called S&S to re-evaluate (will be done likely 9/22). For now, pt wants diet advanced. Risks involved described to him and he verbalized understanding. Will keep on dyph3/nectar pending S&S re-evaluation.

## 2021-09-21 NOTE — PROGRESS NOTE ADULT - SUBJECTIVE AND OBJECTIVE BOX
[facility]  INFECTIOUS DISEASES   [address] [place] [address]  Tel: [phone]     Fax: [phone]  =======================================================    [id number]  JUANIS DE SANTIAGO     Follow up: right hallux cellulitis and bacteremia     No complaint, had amputation of hallux yesterday.   No fever.   Had bone biopsy on 9/15, showing acute osteomyelitis.     PAST MEDICAL & SURGICAL HISTORY:  HTN (hypertension)  HLD (hyperlipidemia)  GERD (gastroesophageal reflux disease)  Cardiomyopathy  History of total hip replacement left  History of laminectomy  ICD (implantable cardiac defibrillator) in place  Benign testicular tumor  History of hip replacement    Social Hx: no smoking, ETOH or drugs     FAMILY HISTORY:  Family history of stroke (Mother)    Allergies  No Known Allergies    Antibiotics:  cefazolin      REVIEW OF SYSTEMS:  CONSTITUTIONAL:  No Fever or chills  HEENT:  No diplopia or blurred vision.  No sore throat or runny nose.  CARDIOVASCULAR:  No chest pain or SOB.  RESPIRATORY:  No cough, shortness of breath, PND or orthopnea.  GASTROINTESTINAL:  No nausea, vomiting or diarrhea.  GENITOURINARY:  No dysuria, frequency or urgency. No Blood in urine  MUSCULOSKELETAL: foot ulcer   SKIN:  No change in skin, hair or nails.  NEUROLOGIC:  No paresthesias, fasciculations, seizures or weakness.  PSYCHIATRIC:  No disorder of thought or mood.  ENDOCRINE:  No heat or cold intolerance, polyuria or polydipsia.  HEMATOLOGICAL:  No easy bruising or bleeding.     Physical Exam:  Vital Signs Last 24 Hrs  T(C): 36.1 (21 Sep 2021 12:14), Max: 36.8 (20 Sep 2021 15:00)  T(F): 97 (21 Sep 2021 12:14), Max: 98.2 (20 Sep 2021 15:00)  HR: 75 (21 Sep 2021 12:14) (75 - 86)  BP: 107/65 (21 Sep 2021 12:14) (95/62 - 133/70)  BP(mean): --  RR: 17 (21 Sep 2021 12:14) (13 - 17)  SpO2: 95% (21 Sep 2021 12:14) (95% - 100%)  GEN: NAD  HEENT: normocephalic and atraumatic. EOMI. PERRL.    NECK: Supple.  No lymphadenopathy   LUNGS: Clear to auscultation.  HEART: Regular rate and rhythm without murmur.  ABDOMEN: Soft, nontender, and nondistended.  Positive bowel sounds.    : No CVA tenderness  EXTREMITIES: R foot dressed after amputation of hallux   NEUROLOGIC: grossly intact.  PSYCHIATRIC: Appropriate affect .  SKIN: No ulceration or induration present.    Labs:                        8.6    4.44  )-----------( 188      ( 21 Sep 2021 07:19 )             27.0     09-20    139  |  103  |  9   ----------------------------<  100<H>  4.0   |  29  |  0.60    Ca    8.3<L>      20 Sep 2021 07:22    TPro  6.6  /  Alb  2.7<L>  /  TBili  0.5  /  DBili  x   /  AST  19  /  ALT  14  /  AlkPhos  113  09-20    Culture - Tissue with Gram Stain (collected 09-21-21 @ 01:30)  Source: .Tissue Right hallux bone culutre  Gram Stain (09-21-21 @ 06:11):    Rare polymorphonuclear leukocytes seen per low power field    No organisms seen per oil power field    Culture - Tissue with Gram Stain (collected 09-21-21 @ 01:29)  Source: .Tissue right first metatarsal bone cu  Gram Stain (09-21-21 @ 06:11):    Rare polymorphonuclear leukocytes seen per low power field    No organisms seen per oil power field    Culture - Blood (collected 09-16-21 @ 12:01)  Source: .Blood Blood-Peripheral  Final Report (09-21-21 @ 13:00):    No Growth Final    Culture - Blood (collected 09-16-21 @ 12:01)  Source: .Blood Blood-Peripheral  Final Report (09-21-21 @ 13:00):    No Growth Final    Culture - Tissue with Gram Stain (collected 09-16-21 @ 01:16)  Source: .Tissue right hallux bone culture  Gram Stain (09-16-21 @ 04:48):    Rare polymorphonuclear leukocytes seen per low power field    No organisms seen per oil power field  Final Report (09-21-21 @ 10:11):    Few Staphylococcus aureus  Organism: Staphylococcus aureus (09-21-21 @ 10:11)  Organism: Staphylococcus aureus (09-21-21 @ 10:11)    Sensitivities:      -  Ampicillin/Sulbactam: S <=8/4      -  Cefazolin: S <=4      -  Clindamycin: S <=0.25      -  Erythromycin: S <=0.25      -  Gentamicin: S 2 Should not be used as monotherapy      -  Oxacillin: S <=0.25      -  RIF- Rifampin: S <=1 Should not be used as monotherapy      -  Tetra/Doxy: S <=1      -  Trimethoprim/Sulfamethoxazole: S <=0.5/9.5      -  Vancomycin: S 2      Method Type: TYSON    Culture - Urine (collected 09-15-21 @ 04:15)  Source: Clean Catch Clean Catch (Midstream)  Final Report (09-16-21 @ 00:06):    <10,000 CFU/mL Normal Urogenital Juani    Culture - Blood (collected 09-15-21 @ 01:20)  Source: .Blood Blood-Peripheral  Gram Stain (09-16-21 @ 01:09):    Growth in aerobic bottle: Gram Positive Cocci in Clusters    Growth in anaerobic bottle: Gram Positive Cocci in Clusters  Final Report (09-17-21 @ 16:59):    Growth in aerobic and anaerobic bottles: Staphylococcus aureus    ***Blood Panel PCR results on this specimen are available    approximately 3 hours after the Gram stain result.***    Gram stain, PCR, and/or culture results may not always    correspond dueto difference in methodologies.    ************************************************************    This PCR assay was performed by multiplex PCR. This    Assay tests for 66 bacterial and resistance gene targets.    Please refer to the [facility] test directory    at [email or web address] for details.  Organism: Blood Culture PCR  Staphylococcus aureus (09-17-21 @ 16:59)  Organism: Staphylococcus aureus (09-17-21 @ 16:59)    Sensitivities:      -  Ampicillin/Sulbactam: S <=8/4      -  Cefazolin: S <=4      -  Clindamycin: R <=0.25 This isolate is presumed to be clindamycin resistant based on detection of inducible resistance. Clindamycin may still be effective in some patients.      -  Erythromycin: I 1      -  Gentamicin: S <=1 Should not be used as monotherapy      -  Oxacillin: S 0.5      -  RIF- Rifampin: S <=1 Should not be used as monotherapy      -  Tetra/Doxy: S <=1      -  Trimethoprim/Sulfamethoxazole: S <=0.5/9.5      -  Vancomycin: S 2      Method Type: TYSON  Organism: Blood Culture PCR (09-17-21 @ 16:59)    Sensitivities:      -  Staphylococcus aureus: Detec Any isolate of Staphylococcus aureus from a blood culture is NOT considered a contaminant.      Method Type: PCR    Culture - Blood (collected 09-15-21 @ 01:20)  Source: .Blood Blood-Peripheral  Gram Stain (09-15-21 @ 22:34):    Growth in aerobic bottle: Gram Positive Cocci in Clusters  Final Report (09-17-21 @ 17:41):    Growth in aerobic bottle: Staphylococcus aureus    See previous culture [id number]    WBC Count: 4.44 K/uL (09-21-21 @ 07:19)  WBC Count: 3.44 K/uL (09-20-21 @ 07:22)  WBC Count: 4.10 K/uL (09-19-21 @ 07:37)  WBC Count: 4.66 K/uL (09-19-21 @ 00:19)  WBC Count: 4.05 K/uL (09-18-21 @ 09:00)  WBC Count: 4.35 K/uL (09-17-21 @ 08:20)    Creatinine, Serum: 0.60 mg/dL (09-20-21 @ 07:22)  Creatinine, Serum: 0.78 mg/dL (09-19-21 @ 07:37)  Creatinine, Serum: 0.66 mg/dL (09-18-21 @ 09:00)  Creatinine, Serum: 0.60 mg/dL (09-17-21 @ 08:20)    C-Reactive Protein, Serum: 168 mg/L (09-15-21 @ 07:50)    Ferritin, Serum: 585 ng/mL (09-15-21 @ 11:30)    Sedimentation Rate, Erythrocyte: 53 mm/hr (09-14-21 @ 20:17)         COVID-19 PCR: NotDetec (09-20-21 @ 12:59)  COVID-19 PCR: NotDetec (09-14-21 @ 20:17)      All imaging and other data have been reviewed.  < from: Xray Tibia + Fibula 2 Views, Right (09.14.21 @ 20:54) >  IMPRESSION: No acute chest finding. Ankle edema and degenerative changes in the foot again seen. Fairly advanced right knee degeneration also again noted.    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 Patient states he was advised to go the wound care center by his oncologist at [place] due to increased redness and edema to right lower leg.     ESR 53  Xray with degenerative changes   s/p debridement and bone biopsy on 9/15 with acute OM    R lower leg cellulitis, R hallux wound    Recommendations:   - Blood culture with MSSA on 9/15  - Blood culture NGTD on 9/16  - Wound culture MSSA  - S/P R 1st toe amputation on 9/20, culture so far negative will follow.   - Continue cefazolin 2gm q8h  - TTE negative, CORBIN due to history TAVR   - Even with clean amputation margines will treat for 6 weeks, Will need a PICC     Will follow.    Jaren Bell MD  Division of Infectious Diseases   Cell [phone] between 8am and 6pm   After 6pm and weekends please call ID service at [phone].

## 2021-09-21 NOTE — PROVIDER CONTACT NOTE (OTHER) - ACTION/TREATMENT ORDERED:
per md she will be in to see pt. 1247 Md at pt bedside discussing diet orders .No new orders for now.

## 2021-09-21 NOTE — DIETITIAN INITIAL EVALUATION ADULT. - PROBLEM SELECTOR PLAN 2
Pt follows Dr. Felipe/Rene for R hallux hammertoe dorsal stage 3 pressure injury  - F/u X-rays R foot, R tibia + fibula, R ankle  - Fall precautions  - Podiatry (Dr. López) consulted, f/u recs

## 2021-09-21 NOTE — PROGRESS NOTE ADULT - SUBJECTIVE AND OBJECTIVE BOX
80y year old Male seen at [facility] 3WES 355 D1 for s/p Right 1st partial ray resection secondary to acute OM with Dr. López, DOS 9/2021. The patient had an incident of strike through yesterday when he walked on the right foot. The patient states that he is experiencing some pain and soreness and he took a percocet last night. Denies any fever, chills, nausea, vomiting, chest pain, shortness of breath, or calf pain at this time.    Allergies    No Known Allergies    Intolerances        MEDICATIONS  (STANDING):  ceFAZolin   IVPB 2000 milliGRAM(s) IV Intermittent every 8 hours  cholecalciferol 400 Unit(s) Oral daily  dorzolamide 2%/timolol 0.5% Ophthalmic Solution 1 Drop(s) Both EYES two times a day  DULoxetine 60 milliGRAM(s) Oral daily  furosemide    Tablet 20 milliGRAM(s) Oral daily  gabapentin 300 milliGRAM(s) Oral two times a day  heparin  Infusion.  Unit(s)/Hr (18 mL/Hr) IV Continuous <Continuous>  ketorolac 0.5% Ophthalmic Solution 1 Drop(s) Both EYES daily  latanoprost 0.005% Ophthalmic Solution 1 Drop(s) Both EYES at bedtime  melatonin 5 milliGRAM(s) Oral at bedtime  multivitamin 1 Tablet(s) Oral daily  pancrelipase  (CREON 36,000 Lipase Units) 2 Capsule(s) Oral three times a day with meals  pantoprazole    Tablet 40 milliGRAM(s) Oral before breakfast  pyridoxine 100 milliGRAM(s) Oral daily  sacubitril 49 mG/valsartan 51 mG 1 Tablet(s) Oral two times a day  sotalol 40 milliGRAM(s) Oral two times a day  tamsulosin 0.4 milliGRAM(s) Oral at bedtime    MEDICATIONS  (PRN):  acetaminophen   Tablet .. 650 milliGRAM(s) Oral every 6 hours PRN Temp greater or equal to 38C (100.4F), Mild Pain (1 - 3)  ALPRAZolam 0.25 milliGRAM(s) Oral at bedtime PRN anxiety  heparin   Injectable 8000 Unit(s) IV Push every 6 hours PRN For aPTT less than 40  heparin   Injectable 4000 Unit(s) IV Push every 6 hours PRN For aPTT between 40 - 57  oxycodone    5 mG/acetaminophen 325 mG 1 Tablet(s) Oral every 6 hours PRN Moderate Pain (4 - 6)  prochlorperazine   Tablet 10 milliGRAM(s) Oral daily PRN nausea or vomiting      Vital Signs Last 24 Hrs  T(C): 36.1 (21 Sep 2021 12:14), Max: 36.8 (20 Sep 2021 15:00)  T(F): 97 (21 Sep 2021 12:14), Max: 98.2 (20 Sep 2021 15:00)  HR: 75 (21 Sep 2021 12:14) (75 - 86)  BP: 107/65 (21 Sep 2021 12:14) (95/62 - 133/70)  BP(mean): --  RR: 17 (21 Sep 2021 12:14) (13 - 18)  SpO2: 95% (21 Sep 2021 12:14) (95% - 100%)    PHYSICAL EXAM:  Vascular: DP and PT palpable, diffuse edema and residual erythema along the right medial forefoot, no digital hair present, skin temperature within normal limits   Neurological: Loss of protective sensation b/l  Musculoskeletal: 5/5 muscle strength in all 4 quadrants b/l, no pain upon palpation along the surgical site, s/p Right 1st partial ray resection   Dermatological:  1. S/p Right 1st partial ray resection- primarily closed- diffuse edema and erythema, sutures intact, skin well approximated, no wound dehiscence  minimal serous sanguineous drainage, no malodor, no streaking, no signs of infection     CBC Full  -  ( 21 Sep 2021 07:19 )  WBC Count : 4.44 K/uL  RBC Count : 2.54 M/uL  Hemoglobin : 8.6 g/dL  Hematocrit : 27.0 %  Platelet Count - Automated : 188 K/uL  Mean Cell Volume : 106.3 fl  Mean Cell Hemoglobin : 33.9 pg  Mean Cell Hemoglobin Concentration : 31.9 gm/dL  Auto Neutrophil # : x  Auto Lymphocyte # : x  Auto Monocyte # : x  Auto Eosinophil # : x  Auto Basophil # : x  Auto Neutrophil % : x  Auto Lymphocyte % : x  Auto Monocyte % : x  Auto Eosinophil % : x  Auto Basophil % : x      ----------CHEM PANEL----------    PTT - ( 21 Sep 2021 07:19 )  PTT:152.2 sec      Culture - Tissue with Gram Stain (collected 21 Sep 2021 01:30)  Source: .Tissue Right hallux bone culutre  Gram Stain (21 Sep 2021 06:11):    Rare polymorphonuclear leukocytes seen per low power field    No organisms seen per oil power field    Culture - Tissue with Gram Stain (collected 21 Sep 2021 01:29)  Source: .Tissue right first metatarsal bone cu  Gram Stain (21 Sep 2021 06:11):    Rare polymorphonuclear leukocytes seen per low power field    No organisms seen per oil power field        Imaging: ----------

## 2021-09-21 NOTE — DIETITIAN INITIAL EVALUATION ADULT. - PROBLEM SELECTOR PLAN 1
Patient presents with LLE erythema, edema, and pain likely 2/2 cellulitis  - Admit to [facility]  - Given IV Zosyn x1, IV NS 2.5 L bolus in ED  - Continue IV Vancomycin and IV Cefepime  - XR right foot: no gas seen - soft tissue swelling by hallux  - Tylenol 650 mg PO q6h PRN for mild pain or fever  - Monitor WBC and fever curve  - Wound care, leg elevation  - F/u X-rays R foot, R tibia + fibula, R ankle  - F/u blood cultures, ESR, CRP  - ID (Dr. Bell) consulted, f/u recs  - Podiatry (Dr. López) consulted, f/u recs

## 2021-09-21 NOTE — PROGRESS NOTE ADULT - ASSESSMENT
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    Cellulitis of right lower leg and Acute OM of right hallux  - Tolerated procedure well, cardiac status optimal post operatively  - Euvolemic on exam.  - Pain control  - MSSA bacteremia on admission BCx; repeat BCx from 9/16 are NGTD  - Surgical pathology of right hallux: +acute Osteomyelitis, staph aureus   - Podiatry/wound care follow up  - ID recs     Pulmonary embolism.   -  Bilateral PE in 7/2020, was on Xarelto   - Continue heparin gtt. Resume Xarelto per surgery.     Chronic systolic HF S/P ICD  - Echo as above revealing not well visualized LV, severe Mitral stenosis, left atrial enlargement   - ICD interrogation completed, no shocks recorded, ICD functioning battery life 2.5 years   - Continue Lasix and Entresto   - No signs of acute volume overload. Tolerating Room air  - Strict I/Os, daily weights  - Monitor and replace electrolytes.  - Unclear why on sotalol , continue need outpatient records     Anemia  - Hemoglobin <--8.6, <--8.5, <--9.0  - Hematocrit <--27.0, <--26.4, <--27.8  - Likely anemia of chronic disease in the setting of pancreatic cancer    Pancreatic Ca  - Patient was scheduled for Whipple's procedure on 9/23/2021 at [facility]   - Management per primary team     - Monitor and replete lytes, keep K>4, Mg>2.  - All other medical needs as per primary team.  - Other cardiovascular workup will depend on clinical course.  - Will continue to follow.    MS DAYANA HaywardP, [facility]  Nurse Practitioner- Cardiology   Spectra #[phone]

## 2021-09-21 NOTE — PROGRESS NOTE ADULT - SUBJECTIVE AND OBJECTIVE BOX
Patient is a 80y old  Male who presents with a chief complaint of right hallux ulcer/cellulitis (20 Sep 2021 16:14)      Subjective:  INTERVAL HPI/OVERNIGHT EVENTS: Patient seen and examined at bedside. Patient is sp R. hallux amputation yesterday. Patient experienced bleeding from surgical wounds overnight when he put weight on the operative foot. The dressing was reinforced with ABD pads, gauze and ace wrap. Instructed patient to be partial weight bearing to the right lower extremity in the immediate post op period. Patient has no complaints at this time. Denies fevers, chills, headache, lightheadedness, chest pain, dyspnea, abdominal pain, n/v/d/c.    MEDICATIONS  (STANDING):  ceFAZolin   IVPB 2000 milliGRAM(s) IV Intermittent every 8 hours  cholecalciferol 400 Unit(s) Oral daily  dorzolamide 2%/timolol 0.5% Ophthalmic Solution 1 Drop(s) Both EYES two times a day  DULoxetine 60 milliGRAM(s) Oral daily  furosemide    Tablet 20 milliGRAM(s) Oral daily  gabapentin 300 milliGRAM(s) Oral two times a day  heparin  Infusion.  Unit(s)/Hr (18 mL/Hr) IV Continuous <Continuous>  ketorolac 0.5% Ophthalmic Solution 1 Drop(s) Both EYES daily  latanoprost 0.005% Ophthalmic Solution 1 Drop(s) Both EYES at bedtime  melatonin 5 milliGRAM(s) Oral at bedtime  multivitamin 1 Tablet(s) Oral daily  pancrelipase  (CREON 36,000 Lipase Units) 2 Capsule(s) Oral three times a day with meals  pantoprazole    Tablet 40 milliGRAM(s) Oral before breakfast  pyridoxine 100 milliGRAM(s) Oral daily  sacubitril 49 mG/valsartan 51 mG 1 Tablet(s) Oral two times a day  sotalol 40 milliGRAM(s) Oral two times a day  tamsulosin 0.4 milliGRAM(s) Oral at bedtime    MEDICATIONS  (PRN):  acetaminophen   Tablet .. 650 milliGRAM(s) Oral every 6 hours PRN Temp greater or equal to 38C (100.4F), Mild Pain (1 - 3)  ALPRAZolam 0.25 milliGRAM(s) Oral at bedtime PRN anxiety  heparin   Injectable 8000 Unit(s) IV Push every 6 hours PRN For aPTT less than 40  heparin   Injectable 4000 Unit(s) IV Push every 6 hours PRN For aPTT between 40 - 57  oxycodone    5 mG/acetaminophen 325 mG 1 Tablet(s) Oral every 6 hours PRN Moderate Pain (4 - 6)  prochlorperazine   Tablet 10 milliGRAM(s) Oral daily PRN nausea or vomiting      Allergies    No Known Allergies    Intolerances        REVIEW OF SYSTEMS:  CONSTITUTIONAL: No fever or chills  HEENT:  No headache, no sore throat  RESPIRATORY: No cough, wheezing, or shortness of breath  CARDIOVASCULAR: No chest pain, palpitations  GASTROINTESTINAL: No abd pain, nausea, vomiting, or diarrhea  GENITOURINARY: No dysuria, frequency, or hematuria  NEUROLOGICAL: no focal weakness or dizziness  MUSCULOSKELETAL: +R foot pain    Objective:  Vital Signs Last 24 Hrs  T(C): 36.7 (21 Sep 2021 05:07), Max: 36.8 (20 Sep 2021 15:00)  T(F): 98 (21 Sep 2021 05:07), Max: 98.2 (20 Sep 2021 15:00)  HR: 83 (21 Sep 2021 05:07) (75 - 86)  BP: 106/64 (21 Sep 2021 05:07) (95/62 - 133/70)  BP(mean): --  RR: 17 (21 Sep 2021 05:07) (13 - 18)  SpO2: 97% (21 Sep 2021 05:07) (95% - 100%)    GENERAL: NAD  HEENT:  anicteric, moist mucous membranes  CHEST/LUNG:  CTA b/l, no rales, wheezes, or rhonchi  HEART:  RRR, S1, S2, +syst murmur  ABDOMEN:  BS+, soft overall but firm in epigastric region, nontender, nondistended  EXTREMITIES: no cyanosis or calf tenderness  NERVOUS SYSTEM: answers questions and follows commands appropriately  MSK: R foot wrapped in ace bandage with gauze over hallux. Dressing C/D/I. Calves soft, nontender, chronic swelling of posterior left elbow, soft, non tender (chronic per pt)      LABS:                        8.6    4.44  )-----------( 188      ( 21 Sep 2021 07:19 )             27.0     CBC Full  -  ( 21 Sep 2021 07:19 )  WBC Count : 4.44 K/uL  Hemoglobin : 8.6 g/dL  Hematocrit : 27.0 %  Platelet Count - Automated : 188 K/uL  Mean Cell Volume : 106.3 fl  Mean Cell Hemoglobin : 33.9 pg  Mean Cell Hemoglobin Concentration : 31.9 gm/dL  Auto Neutrophil # : x  Auto Lymphocyte # : x  Auto Monocyte # : x  Auto Eosinophil # : x  Auto Basophil # : x  Auto Neutrophil % : x  Auto Lymphocyte % : x  Auto Monocyte % : x  Auto Eosinophil % : x  Auto Basophil % : x      Ca    8.3        20 Sep 2021 07:22      PT/INR - ( 19 Sep 2021 07:37 )   PT: 16.2 sec;   INR: 1.41 ratio         PTT - ( 20 Sep 2021 07:22 )  PTT:86.3 sec    CAPILLARY BLOOD GLUCOSE            Culture - Tissue with Gram Stain (collected 09-21-21 @ 01:30)  Source: .Tissue Right hallux bone culutre  Gram Stain (09-21-21 @ 06:11):    Rare polymorphonuclear leukocytes seen per low power field    No organisms seen per oil power field    Culture - Tissue with Gram Stain (collected 09-21-21 @ 01:29)  Source: .Tissue right first metatarsal bone cu  Gram Stain (09-21-21 @ 06:11):    Rare polymorphonuclear leukocytes seen per low power field    No organisms seen per oil power field    Culture - Blood (collected 09-16-21 @ 12:01)  Source: .Blood Blood-Peripheral  Preliminary Report (09-17-21 @ 13:02):    No growth to date.    Culture - Blood (collected 09-16-21 @ 12:01)  Source: .Blood Blood-Peripheral  Preliminary Report (09-17-21 @ 13:02):    No growth to date.    Culture - Tissue with Gram Stain (collected 09-16-21 @ 01:16)  Source: .Tissue right hallux bone culture  Gram Stain (09-16-21 @ 04:48):    Rare polymorphonuclear leukocytes seen per low power field    No organisms seen per oil power field  Preliminary Report (09-17-21 @ 09:03):    Few Staphylococcus aureus  Organism: Staphylococcus aureus (09-18-21 @ 13:19)  Organism: Staphylococcus aureus (09-18-21 @ 13:19)      -  Ampicillin/Sulbactam: S <=8/4      -  Cefazolin: S <=4      -  Clindamycin: S <=0.25      -  Erythromycin: S <=0.25      -  Gentamicin: S 2 Should not be used as monotherapy      -  Oxacillin: S <=0.25      -  RIF- Rifampin: S <=1 Should not be used as monotherapy      -  Tetra/Doxy: S <=1      -  Trimethoprim/Sulfamethoxazole: S <=0.5/9.5      -  Vancomycin: S 2      Method Type: TYSON    Culture - Urine (collected 09-15-21 @ 04:15)  Source: Clean Catch Clean Catch (Midstream)  Final Report (09-16-21 @ 00:06):    <10,000 CFU/mL Normal Urogenital Juani    Culture - Blood (collected 09-15-21 @ 01:20)  Source: .Blood Blood-Peripheral  Gram Stain (09-16-21 @ 01:09):    Growth in aerobic bottle: Gram Positive Cocci in Clusters    Growth in anaerobic bottle: Gram Positive Cocci in Clusters  Final Report (09-17-21 @ 16:59):    Growth in aerobic and anaerobic bottles: Staphylococcus aureus    ***Blood Panel PCR results on this specimen are available    approximately 3 hours after the Gram stain result.***    Gram stain, PCR, and/or culture results may not always    correspond dueto difference in methodologies.    ************************************************************    This PCR assay was performed by multiplex PCR. This    Assay tests for 66 bacterial and resistance gene targets.    Please refer to the [facility] test directory    at [email or web address] for details.  Organism: Blood Culture PCR  Staphylococcus aureus (09-17-21 @ 16:59)  Organism: Staphylococcus aureus (09-17-21 @ 16:59)      -  Ampicillin/Sulbactam: S <=8/4      -  Cefazolin: S <=4      -  Clindamycin: R <=0.25 This isolate is presumed to be clindamycin resistant based on detection of inducible resistance. Clindamycin may still be effective in some patients.      -  Erythromycin: I 1      -  Gentamicin: S <=1 Should not be used as monotherapy      -  Oxacillin: S 0.5      -  RIF- Rifampin: S <=1 Should not be used as monotherapy      -  Tetra/Doxy: S <=1      -  Trimethoprim/Sulfamethoxazole: S <=0.5/9.5      -  Vancomycin: S 2      Method Type: TYSON  Organism: Blood Culture PCR (09-17-21 @ 16:59)      -  Staphylococcus aureus: Detec Any isolate of Staphylococcus aureus from a blood culture is NOT considered a contaminant.      Method Type: PCR    Culture - Blood (collected 09-15-21 @ 01:20)  Source: .Blood Blood-Peripheral  Gram Stain (09-15-21 @ 22:34):    Growth in aerobic bottle: Gram Positive Cocci in Clusters  Final Report (09-17-21 @ 17:41):    Growth in aerobic bottle: Staphylococcus aureus    See previous culture [id number]        RADIOLOGY & ADDITIONAL TESTS:    Personally reviewed.     Consultant(s) Notes Reviewed:  [x] YES  [ ] NO     Patient is a 80y old  Male who presents with a chief complaint of right hallux ulcer/cellulitis (20 Sep 2021 16:14)      Subjective:  INTERVAL HPI/OVERNIGHT EVENTS: Patient seen and examined at bedside. Patient is sp R. hallux amputation yesterday. Patient experienced bleeding from surgical wounds overnight when he put weight on the operative foot. The dressing was reinforced with ABD pads, gauze and ace wrap. Instructed patient to be partial weight bearing to the right lower extremity in the immediate post op period. Patient has no complaints at this time. Denies fevers, chills, headache, lightheadedness, chest pain, dyspnea, abdominal pain, n/v/d/c.    MEDICATIONS  (STANDING):  ceFAZolin   IVPB 2000 milliGRAM(s) IV Intermittent every 8 hours  cholecalciferol 400 Unit(s) Oral daily  dorzolamide 2%/timolol 0.5% Ophthalmic Solution 1 Drop(s) Both EYES two times a day  DULoxetine 60 milliGRAM(s) Oral daily  furosemide    Tablet 20 milliGRAM(s) Oral daily  gabapentin 300 milliGRAM(s) Oral two times a day  heparin  Infusion.  Unit(s)/Hr (18 mL/Hr) IV Continuous <Continuous>  ketorolac 0.5% Ophthalmic Solution 1 Drop(s) Both EYES daily  latanoprost 0.005% Ophthalmic Solution 1 Drop(s) Both EYES at bedtime  melatonin 5 milliGRAM(s) Oral at bedtime  multivitamin 1 Tablet(s) Oral daily  pancrelipase  (CREON 36,000 Lipase Units) 2 Capsule(s) Oral three times a day with meals  pantoprazole    Tablet 40 milliGRAM(s) Oral before breakfast  pyridoxine 100 milliGRAM(s) Oral daily  sacubitril 49 mG/valsartan 51 mG 1 Tablet(s) Oral two times a day  sotalol 40 milliGRAM(s) Oral two times a day  tamsulosin 0.4 milliGRAM(s) Oral at bedtime    MEDICATIONS  (PRN):  acetaminophen   Tablet .. 650 milliGRAM(s) Oral every 6 hours PRN Temp greater or equal to 38C (100.4F), Mild Pain (1 - 3)  ALPRAZolam 0.25 milliGRAM(s) Oral at bedtime PRN anxiety  heparin   Injectable 8000 Unit(s) IV Push every 6 hours PRN For aPTT less than 40  heparin   Injectable 4000 Unit(s) IV Push every 6 hours PRN For aPTT between 40 - 57  oxycodone    5 mG/acetaminophen 325 mG 1 Tablet(s) Oral every 6 hours PRN Moderate Pain (4 - 6)  prochlorperazine   Tablet 10 milliGRAM(s) Oral daily PRN nausea or vomiting      Allergies    No Known Allergies    Intolerances        REVIEW OF SYSTEMS:  CONSTITUTIONAL: No fever or chills  HEENT:  No headache, no sore throat  RESPIRATORY: No cough, wheezing, or shortness of breath  CARDIOVASCULAR: No chest pain, palpitations  GASTROINTESTINAL: No abd pain, nausea, vomiting, or diarrhea  GENITOURINARY: No dysuria, frequency, or hematuria  NEUROLOGICAL: no focal weakness or dizziness  MUSCULOSKELETAL: +R foot pain    Objective:  Vital Signs Last 24 Hrs  T(C): 36.7 (21 Sep 2021 05:07), Max: 36.8 (20 Sep 2021 15:00)  T(F): 98 (21 Sep 2021 05:07), Max: 98.2 (20 Sep 2021 15:00)  HR: 83 (21 Sep 2021 05:07) (75 - 86)  BP: 106/64 (21 Sep 2021 05:07) (95/62 - 133/70)  BP(mean): --  RR: 17 (21 Sep 2021 05:07) (13 - 18)  SpO2: 97% (21 Sep 2021 05:07) (95% - 100%)    GENERAL: NAD  HEENT:  anicteric, moist mucous membranes  CHEST/LUNG:  CTA b/l, no rales, wheezes, or rhonchi  HEART:  RRR, S1, S2, +syst murmur  ABDOMEN:  BS+, soft overall but firm in epigastric region, nontender, nondistended  EXTREMITIES: no cyanosis or calf tenderness  NERVOUS SYSTEM: answers questions and follows commands appropriately  MSK: R foot wrapped in ace bandage Dressing C/D/I. SILT over distal aspect of right foot/phalanges. Calves soft, nontender, chronic swelling of posterior left elbow, soft, non tender (chronic per pt)      LABS:                        8.6    4.44  )-----------( 188      ( 21 Sep 2021 07:19 )             27.0     CBC Full  -  ( 21 Sep 2021 07:19 )  WBC Count : 4.44 K/uL  Hemoglobin : 8.6 g/dL  Hematocrit : 27.0 %  Platelet Count - Automated : 188 K/uL  Mean Cell Volume : 106.3 fl  Mean Cell Hemoglobin : 33.9 pg  Mean Cell Hemoglobin Concentration : 31.9 gm/dL  Auto Neutrophil # : x  Auto Lymphocyte # : x  Auto Monocyte # : x  Auto Eosinophil # : x  Auto Basophil # : x  Auto Neutrophil % : x  Auto Lymphocyte % : x  Auto Monocyte % : x  Auto Eosinophil % : x  Auto Basophil % : x      Ca    8.3        20 Sep 2021 07:22      PT/INR - ( 19 Sep 2021 07:37 )   PT: 16.2 sec;   INR: 1.41 ratio         PTT - ( 20 Sep 2021 07:22 )  PTT:86.3 sec    CAPILLARY BLOOD GLUCOSE            Culture - Tissue with Gram Stain (collected 09-21-21 @ 01:30)  Source: .Tissue Right hallux bone culutre  Gram Stain (09-21-21 @ 06:11):    Rare polymorphonuclear leukocytes seen per low power field    No organisms seen per oil power field    Culture - Tissue with Gram Stain (collected 09-21-21 @ 01:29)  Source: .Tissue right first metatarsal bone cu  Gram Stain (09-21-21 @ 06:11):    Rare polymorphonuclear leukocytes seen per low power field    No organisms seen per oil power field    Culture - Blood (collected 09-16-21 @ 12:01)  Source: .Blood Blood-Peripheral  Preliminary Report (09-17-21 @ 13:02):    No growth to date.    Culture - Blood (collected 09-16-21 @ 12:01)  Source: .Blood Blood-Peripheral  Preliminary Report (09-17-21 @ 13:02):    No growth to date.    Culture - Tissue with Gram Stain (collected 09-16-21 @ 01:16)  Source: .Tissue right hallux bone culture  Gram Stain (09-16-21 @ 04:48):    Rare polymorphonuclear leukocytes seen per low power field    No organisms seen per oil power field  Preliminary Report (09-17-21 @ 09:03):    Few Staphylococcus aureus  Organism: Staphylococcus aureus (09-18-21 @ 13:19)  Organism: Staphylococcus aureus (09-18-21 @ 13:19)      -  Ampicillin/Sulbactam: S <=8/4      -  Cefazolin: S <=4      -  Clindamycin: S <=0.25      -  Erythromycin: S <=0.25      -  Gentamicin: S 2 Should not be used as monotherapy      -  Oxacillin: S <=0.25      -  RIF- Rifampin: S <=1 Should not be used as monotherapy      -  Tetra/Doxy: S <=1      -  Trimethoprim/Sulfamethoxazole: S <=0.5/9.5      -  Vancomycin: S 2      Method Type: TYSON    Culture - Urine (collected 09-15-21 @ 04:15)  Source: Clean Catch Clean Catch (Midstream)  Final Report (09-16-21 @ 00:06):    <10,000 CFU/mL Normal Urogenital Juani    Culture - Blood (collected 09-15-21 @ 01:20)  Source: .Blood Blood-Peripheral  Gram Stain (09-16-21 @ 01:09):    Growth in aerobic bottle: Gram Positive Cocci in Clusters    Growth in anaerobic bottle: Gram Positive Cocci in Clusters  Final Report (09-17-21 @ 16:59):    Growth in aerobic and anaerobic bottles: Staphylococcus aureus    ***Blood Panel PCR results on this specimen are available    approximately 3 hours after the Gram stain result.***    Gram stain, PCR, and/or culture results may not always    correspond dueto difference in methodologies.    ************************************************************    This PCR assay was performed by multiplex PCR. This    Assay tests for 66 bacterial and resistance gene targets.    Please refer to the [facility] test directory    at [email or web address] for details.  Organism: Blood Culture PCR  Staphylococcus aureus (09-17-21 @ 16:59)  Organism: Staphylococcus aureus (09-17-21 @ 16:59)      -  Ampicillin/Sulbactam: S <=8/4      -  Cefazolin: S <=4      -  Clindamycin: R <=0.25 This isolate is presumed to be clindamycin resistant based on detection of inducible resistance. Clindamycin may still be effective in some patients.      -  Erythromycin: I 1      -  Gentamicin: S <=1 Should not be used as monotherapy      -  Oxacillin: S 0.5      -  RIF- Rifampin: S <=1 Should not be used as monotherapy      -  Tetra/Doxy: S <=1      -  Trimethoprim/Sulfamethoxazole: S <=0.5/9.5      -  Vancomycin: S 2      Method Type: TYSON  Organism: Blood Culture PCR (09-17-21 @ 16:59)      -  Staphylococcus aureus: Detec Any isolate of Staphylococcus aureus from a blood culture is NOT considered a contaminant.      Method Type: PCR    Culture - Blood (collected 09-15-21 @ 01:20)  Source: .Blood Blood-Peripheral  Gram Stain (09-15-21 @ 22:34):    Growth in aerobic bottle: Gram Positive Cocci in Clusters  Final Report (09-17-21 @ 17:41):    Growth in aerobic bottle: Staphylococcus aureus    See previous culture [id number]        RADIOLOGY & ADDITIONAL TESTS:    Personally reviewed.     Consultant(s) Notes Reviewed:  [x] YES  [ ] NO

## 2021-09-21 NOTE — PROVIDER CONTACT NOTE (OTHER) - ASSESSMENT
pt complains of food tray being to soft and would like a regular diet. pt not eating  what is on meal tray at this time.

## 2021-09-21 NOTE — PROGRESS NOTE ADULT - ASSESSMENT
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 López, DOS 9/2021- primarily closed     Follow up on the OR pathologies and cultures    Follow up on infectious disease recommendations     Per Dr. López, the general surgeon from [place] who originally had the patient's whipple schedules- states that he will cancel the patient's surgery, the Whipple procedure for his pancreatic cancer  that was originally scheduled for this week, the general surgeon cancelled it and states that he will wait until the OR pathologies come back from the amputation and after the infection resolves to avoid any post operative complications once the whipple procedure is done    Area dressed with Surgicel Nu Knit and DSD    Podiatry team will continue to follow patient while in house

## 2021-09-21 NOTE — DIETITIAN INITIAL EVALUATION ADULT. - PROBLEM SELECTOR PLAN 9
Chronic, diagnosed in March 2021 on chemotherapy (does not recall name of medication)  - Continue home medication Pancrelipase 36,000 2 tabs PO TID with meals  - Pt scheduled for Whipple's procedure on 9/23/2021 with Dr. Shon Murphy at [place]

## 2021-09-21 NOTE — DIETITIAN INITIAL EVALUATION ADULT. - PERTINENT LABORATORY DATA
9/21 H/H 8.6/27.0 .3  9/15 Fol (N)  B12 (high)  Ferritin (High)  9/20 Glu 100 Ca 8.3 (Alb 2.7) Corrected Ca 10.14

## 2021-09-21 NOTE — PROGRESS NOTE ADULT - SUBJECTIVE AND OBJECTIVE BOX
[facility] -- Milo Thomas [place], [place], Arian Rand [place], [place]: Office # [phone]    Follow Up:  CM ICD HTN Pre and Postop Cardiac Optimization     Subjective/Observations: Patient seen and examined. Patient awake, alert, resting in bed. No complaints of chest pain, dyspnea, palpitations or dizziness. No signs of orthopnea or PND. Tolerating room air. Continuing on Heparin gtt.     REVIEW OF SYSTEMS: All review of systems is negative for eye, ENT, GI, , allergic, dermatologic, musculoskeletal and neurologic except as described above    PAST MEDICAL & SURGICAL HISTORY:  HTN (hypertension)    HTN (hypertension)    HLD (hyperlipidemia)    GERD (gastroesophageal reflux disease)    Cardiomyopathy    History of total hip replacement  left    History of laminectomy    ICD (implantable cardiac defibrillator) in place    Benign testicular tumor    History of hip replacement    MEDICATIONS  (STANDING):  ceFAZolin   IVPB 2000 milliGRAM(s) IV Intermittent every 8 hours  cholecalciferol 400 Unit(s) Oral daily  dorzolamide 2%/timolol 0.5% Ophthalmic Solution 1 Drop(s) Both EYES two times a day  DULoxetine 60 milliGRAM(s) Oral daily  furosemide    Tablet 20 milliGRAM(s) Oral daily  gabapentin 300 milliGRAM(s) Oral two times a day  heparin  Infusion.  Unit(s)/Hr (18 mL/Hr) IV Continuous <Continuous>  ketorolac 0.5% Ophthalmic Solution 1 Drop(s) Both EYES daily  latanoprost 0.005% Ophthalmic Solution 1 Drop(s) Both EYES at bedtime  melatonin 5 milliGRAM(s) Oral at bedtime  multivitamin 1 Tablet(s) Oral daily  pancrelipase  (CREON 36,000 Lipase Units) 2 Capsule(s) Oral three times a day with meals  pantoprazole    Tablet 40 milliGRAM(s) Oral before breakfast  pyridoxine 100 milliGRAM(s) Oral daily  sacubitril 49 mG/valsartan 51 mG 1 Tablet(s) Oral two times a day  sotalol 40 milliGRAM(s) Oral two times a day  tamsulosin 0.4 milliGRAM(s) Oral at bedtime    MEDICATIONS  (PRN):  acetaminophen   Tablet .. 650 milliGRAM(s) Oral every 6 hours PRN Temp greater or equal to 38C (100.4F), Mild Pain (1 - 3)  ALPRAZolam 0.25 milliGRAM(s) Oral at bedtime PRN anxiety  heparin   Injectable 8000 Unit(s) IV Push every 6 hours PRN For aPTT less than 40  heparin   Injectable 4000 Unit(s) IV Push every 6 hours PRN For aPTT between 40 - 57  oxycodone    5 mG/acetaminophen 325 mG 1 Tablet(s) Oral every 6 hours PRN Moderate Pain (4 - 6)  prochlorperazine   Tablet 10 milliGRAM(s) Oral daily PRN nausea or vomiting    Allergies  No Known Allergies    Vital Signs Last 24 Hrs  T(C): 36.7 (21 Sep 2021 05:07), Max: 36.8 (20 Sep 2021 15:00)  T(F): 98 (21 Sep 2021 05:07), Max: 98.2 (20 Sep 2021 15:00)  HR: 83 (21 Sep 2021 05:07) (75 - 86)  BP: 106/64 (21 Sep 2021 05:07) (95/62 - 133/70)  BP(mean): --  RR: 17 (21 Sep 2021 05:07) (13 - 18)  SpO2: 97% (21 Sep 2021 05:07) (95% - 100%)  I&O's Summary    20 Sep 2021 07:01  -  21 Sep 2021 07:00  --------------------------------------------------------  IN: 0 mL / OUT: 1600 mL / NET: -1600 mL    21 Sep 2021 07:01  -  21 Sep 2021 11:51  --------------------------------------------------------  IN: 66 mL / OUT: 350 mL / NET: -284 mL      Weight (kg): 97.5 (09-20 @ 15:00)    TELE: Not on telemetry   PHYSICAL EXAM:  Appearance: NAD, no distress, alert, Well developed   HEENT: Moist Mucous Membranes, Anicteric  Cardiovascular: Regular rate and rhythm, Normal S1 S2, No JVD, + murmurs, No rubs, gallops or clicks  Respiratory: Non-labored, Clear to auscultation, No rales, No rhonchi, No wheezing.   Gastrointestinal:  Soft, Non-tender, + BS  Neurologic: Non-focal  Skin: Warm and dry, No visible rashes or ulcers, No ecchymosis, No cyanosis  Musculoskeletal: No clubbing, No cyanosis, No joint swelling/tenderness  Psychiatry: Mood & affect appropriate  Lymph: No peripheral edema.     LABS: All Labs Reviewed:                        8.6    4.44  )-----------( 188      ( 21 Sep 2021 07:19 )             27.0                         8.5    3.44  )-----------( 170      ( 20 Sep 2021 07:22 )             26.4                         9.0    4.10  )-----------( 124      ( 19 Sep 2021 07:37 )             27.8     20 Sep 2021 07:22    139    |  103    |  9      ----------------------------<  100    4.0     |  29     |  0.60   19 Sep 2021 07:37    136    |  103    |  10     ----------------------------<  102    4.3     |  29     |  0.78     Ca    8.3        20 Sep 2021 07:22  Ca    8.1        19 Sep 2021 07:37    TPro  6.6    /  Alb  2.7    /  TBili  0.5    /  DBili  x      /  AST  19     /  ALT  14     /  AlkPhos  113    20 Sep 2021 07:22  TPro  6.4    /  Alb  2.5    /  TBili  0.5    /  DBili  x      /  AST  20     /  ALT  17     /  AlkPhos  115    19 Sep 2021 07:37    PTT - ( 21 Sep 2021 07:19 )  PTT:152.2 sec    12 Lead ECG:   Ventricular Rate 97 BPM    Atrial Rate 97 BPM    P-R Interval 130 ms    QRS Duration 122 ms    Q-T Interval 374 ms    QTC Calculation(Bazett) 474 ms    P Axis 43 degrees    R Axis 35 degrees    T Axis 173 degrees    Diagnosis Line Normal sinus rhythm  Left bundle branch block  Confirmed by JAI RAND ([id number]) on 9/15/2021 10:39:54 AM (09-14-21 @ 20:29)     EXAM:  ECHO TTE WO CON COMP W DOPP    PROCEDURE DATE:  09/18/2021    INTERPRETATION:  INDICATION: Infectious endocarditis  Sonographer KL  Blood Pressure 108/71    Height 185.4 cm     Weight 97.5 kg       BSA 2.22 sq m    Dimensions:  LA 4.3       Normal Values: 2.0 - 4.0 cm  Ao 3.5        Normal Values: 2.0 - 3.8 cm  SEPTUM 1.3       Normal Values: 0.6 - 1.2 cm  PWT 1.2       Normal Values: 0.6 - 1.1 cm  LVIDd 4.9         Normal Values: 3.0 - 5.6 cm  LVIDs 4.3         Normal Values: 1.8 - 4.0 cm      OBSERVATIONS:  Technically difficult and very limited study  Mitral Valve: Mitral annular calcification and calcified leaflets with restricted opening. Mean transmitral valve gradient equals 10.6 mmHg which is consistent with severe mitral stenosis.  Mild MR.  Aortic Valve/Aorta: Patient has a history of bioprosthetic aortic valve replacement. Peak transaortic valve gradient 21 mmHg with a mean transaortic valve gradient of 11 mmHg. This is probably normal in the setting of a prosthetic valve  Tricuspid Valve: Not well-visualized  Pulmonic Valve: Not well-visualized  Left Atrium: Enlarged  Right Atrium: Not well-visualized  Left Ventricle: The left ventricular endocardium is not well-visualized. Unable to accurately assess left ventricular function. If clinically indicated can consider further imaging with echo contrast  Right Ventricle: The right ventricle is not well-visualized. A device wire is noted within the right heart  Pericardium: no significant pericardial effusion.  Pulmonary/RV Pressure: estimated PA systolic pressure of 39 mmHg    IMPRESSION:  Technically difficult and very limited study  The left ventricular endocardium is not well-visualized. Unable to accurately assess left ventricular function. If clinically indicated can consider further imaging with echo contrast  The right ventricle is not well-visualized. A device wire is noted within the right heart  Patient has a history of bioprosthetic aortic valve replacement. Peak transaortic valvegradient 21 mmHg with a mean transaortic valve gradient of 11 mmHg. This is probably normal in the setting of a prosthetic valve  Calcified mitral valve with restricted opening. Valve gradients are consistent with severe mitral stenosis  Mild MR  Left atrial enlargement  --- End of Report ---    TASHA RENDON MD; Attending Cardiologist  This document has been electronically signed. Sep 18 2021  8:43PM    Xray Chest 1 View AP/PA:   EXAM:  XR FOOT COMP MIN 3 VIEWS RT                          EXAM:  XR ANKLE COMP MIN 3 VIEWS RT                          EXAM:  XR CHEST AP OR PA 1V                          EXAM:  XR TIB FIB AP LAT 2 VIEWS RT                          PROCEDURE DATE:  09/14/2021      INTERPRETATION:  Chest and right tib-fib, ankle, and foot. Patient has cellulitis of the right leg and wound care the foot. There is history of pancreatic cancer.    AP chest on September 14, 2021 at 8:51 PM.    Heart magnified by technique. Extensive is thoracolumbar hardware left-sided [id number]-s-Lnpn remain.    Mild right thoracic curve again noted.    Lungs remain clear.    Present study shows a right MediPort new since June 12, 2020.      Right tib-fib. AP and lateral views. Arterial calcifications. No knee effusion.    Moderate to advanced knee degeneration. No bone destruction or fracture.    Right ankle. 3 views. Arterial calcification and ankle edema. No bone destruction or fracture.    Right foot. 3 views.    Partial subluxation of the right first MTP joint with associated degeneration again noted.    Scattered slight IP degeneration again seen. No fracture or radiographic bone destruction.    Right foot is similar to August 4.    IMPRESSION: No acute chest finding. Ankle edema and degenerative changes in the foot again seen. Fairly advanced right knee degeneration also again noted.  --- End of Report ---  JODI GURROLA MD; Attending Radiologist  This document has been electronically signed. Sep 15 2021 11:45AM (09-14-21 @ 20:53)

## 2021-09-22 ENCOUNTER — TRANSCRIPTION ENCOUNTER (OUTPATIENT)
Age: 81
End: 2021-09-22

## 2021-09-22 LAB
A1C WITH ESTIMATED AVERAGE GLUCOSE RESULT: 5.3 % — SIGNIFICANT CHANGE UP (ref 4–5.6)
ANION GAP SERPL CALC-SCNC: 3 MMOL/L — LOW (ref 5–17)
BUN SERPL-MCNC: 8 MG/DL — SIGNIFICANT CHANGE UP (ref 7–23)
CALCIUM SERPL-MCNC: 8.1 MG/DL — LOW (ref 8.5–10.1)
CHLORIDE SERPL-SCNC: 104 MMOL/L — SIGNIFICANT CHANGE UP (ref 96–108)
CO2 SERPL-SCNC: 31 MMOL/L — SIGNIFICANT CHANGE UP (ref 22–31)
CREAT SERPL-MCNC: 0.84 MG/DL — SIGNIFICANT CHANGE UP (ref 0.5–1.3)
ESTIMATED AVERAGE GLUCOSE: 105 MG/DL — SIGNIFICANT CHANGE UP (ref 68–114)
GLUCOSE SERPL-MCNC: 127 MG/DL — HIGH (ref 70–99)
HCT VFR BLD CALC: 26.5 % — LOW (ref 39–50)
HGB BLD-MCNC: 8.3 G/DL — LOW (ref 13–17)
MCHC RBC-ENTMCNC: 31.3 GM/DL — LOW (ref 32–36)
MCHC RBC-ENTMCNC: 33.2 PG — SIGNIFICANT CHANGE UP (ref 27–34)
MCV RBC AUTO: 106 FL — HIGH (ref 80–100)
NRBC # BLD: 0 /100 WBCS — SIGNIFICANT CHANGE UP (ref 0–0)
PLATELET # BLD AUTO: 213 K/UL — SIGNIFICANT CHANGE UP (ref 150–400)
POTASSIUM SERPL-MCNC: 4.5 MMOL/L — SIGNIFICANT CHANGE UP (ref 3.5–5.3)
POTASSIUM SERPL-SCNC: 4.5 MMOL/L — SIGNIFICANT CHANGE UP (ref 3.5–5.3)
RBC # BLD: 2.5 M/UL — LOW (ref 4.2–5.8)
RBC # FLD: 15.7 % — HIGH (ref 10.3–14.5)
SODIUM SERPL-SCNC: 138 MMOL/L — SIGNIFICANT CHANGE UP (ref 135–145)
WBC # BLD: 4.04 K/UL — SIGNIFICANT CHANGE UP (ref 3.8–10.5)
WBC # FLD AUTO: 4.04 K/UL — SIGNIFICANT CHANGE UP (ref 3.8–10.5)

## 2021-09-22 PROCEDURE — 99024 POSTOP FOLLOW-UP VISIT: CPT

## 2021-09-22 PROCEDURE — 99233 SBSQ HOSP IP/OBS HIGH 50: CPT

## 2021-09-22 PROCEDURE — 99232 SBSQ HOSP IP/OBS MODERATE 35: CPT

## 2021-09-22 PROCEDURE — 36573 INSJ PICC RS&I 5 YR+: CPT

## 2021-09-22 PROCEDURE — 99232 SBSQ HOSP IP/OBS MODERATE 35: CPT | Mod: GC

## 2021-09-22 RX ORDER — CHLORHEXIDINE GLUCONATE 213 G/1000ML
1 SOLUTION TOPICAL
Refills: 0 | Status: DISCONTINUED | OUTPATIENT
Start: 2021-09-22 | End: 2021-09-26

## 2021-09-22 RX ORDER — LIDOCAINE 4 G/100G
1 CREAM TOPICAL DAILY
Refills: 0 | Status: DISCONTINUED | OUTPATIENT
Start: 2021-09-22 | End: 2021-09-26

## 2021-09-22 RX ORDER — SODIUM CHLORIDE 9 MG/ML
10 INJECTION INTRAMUSCULAR; INTRAVENOUS; SUBCUTANEOUS
Refills: 0 | Status: DISCONTINUED | OUTPATIENT
Start: 2021-09-22 | End: 2021-09-26

## 2021-09-22 RX ADMIN — Medication 100 MILLIGRAM(S): at 14:00

## 2021-09-22 RX ADMIN — Medication 100 MILLIGRAM(S): at 11:31

## 2021-09-22 RX ADMIN — OXYCODONE AND ACETAMINOPHEN 1 TABLET(S): 5; 325 TABLET ORAL at 23:12

## 2021-09-22 RX ADMIN — OXYCODONE AND ACETAMINOPHEN 1 TABLET(S): 5; 325 TABLET ORAL at 10:44

## 2021-09-22 RX ADMIN — Medication 1 TABLET(S): at 11:31

## 2021-09-22 RX ADMIN — PANTOPRAZOLE SODIUM 40 MILLIGRAM(S): 20 TABLET, DELAYED RELEASE ORAL at 06:28

## 2021-09-22 RX ADMIN — GABAPENTIN 300 MILLIGRAM(S): 400 CAPSULE ORAL at 18:23

## 2021-09-22 RX ADMIN — Medication 100 MILLIGRAM(S): at 21:12

## 2021-09-22 RX ADMIN — DULOXETINE HYDROCHLORIDE 60 MILLIGRAM(S): 30 CAPSULE, DELAYED RELEASE ORAL at 11:31

## 2021-09-22 RX ADMIN — OXYCODONE AND ACETAMINOPHEN 1 TABLET(S): 5; 325 TABLET ORAL at 22:06

## 2021-09-22 RX ADMIN — SACUBITRIL AND VALSARTAN 1 TABLET(S): 24; 26 TABLET, FILM COATED ORAL at 18:23

## 2021-09-22 RX ADMIN — DORZOLAMIDE HYDROCHLORIDE TIMOLOL MALEATE 1 DROP(S): 20; 5 SOLUTION/ DROPS OPHTHALMIC at 06:28

## 2021-09-22 RX ADMIN — Medication 5 MILLIGRAM(S): at 22:06

## 2021-09-22 RX ADMIN — LIDOCAINE 1 PATCH: 4 CREAM TOPICAL at 19:36

## 2021-09-22 RX ADMIN — DORZOLAMIDE HYDROCHLORIDE TIMOLOL MALEATE 1 DROP(S): 20; 5 SOLUTION/ DROPS OPHTHALMIC at 18:24

## 2021-09-22 RX ADMIN — GABAPENTIN 300 MILLIGRAM(S): 400 CAPSULE ORAL at 06:28

## 2021-09-22 RX ADMIN — Medication 0.25 MILLIGRAM(S): at 23:12

## 2021-09-22 RX ADMIN — Medication 2 CAPSULE(S): at 13:19

## 2021-09-22 RX ADMIN — Medication 400 UNIT(S): at 11:31

## 2021-09-22 RX ADMIN — Medication 40 MILLIGRAM(S): at 18:23

## 2021-09-22 RX ADMIN — Medication 650 MILLIGRAM(S): at 16:06

## 2021-09-22 RX ADMIN — Medication 40 MILLIGRAM(S): at 06:27

## 2021-09-22 RX ADMIN — Medication 100 MILLIGRAM(S): at 06:28

## 2021-09-22 RX ADMIN — LIDOCAINE 1 PATCH: 4 CREAM TOPICAL at 13:20

## 2021-09-22 RX ADMIN — RIVAROXABAN 20 MILLIGRAM(S): KIT at 18:23

## 2021-09-22 RX ADMIN — Medication 1 DROP(S): at 11:30

## 2021-09-22 RX ADMIN — OXYCODONE AND ACETAMINOPHEN 1 TABLET(S): 5; 325 TABLET ORAL at 11:40

## 2021-09-22 RX ADMIN — TAMSULOSIN HYDROCHLORIDE 0.4 MILLIGRAM(S): 0.4 CAPSULE ORAL at 21:13

## 2021-09-22 RX ADMIN — Medication 2 CAPSULE(S): at 08:36

## 2021-09-22 RX ADMIN — LATANOPROST 1 DROP(S): 0.05 SOLUTION/ DROPS OPHTHALMIC; TOPICAL at 21:13

## 2021-09-22 RX ADMIN — Medication 2 CAPSULE(S): at 17:50

## 2021-09-22 RX ADMIN — Medication 650 MILLIGRAM(S): at 17:00

## 2021-09-22 NOTE — DISCHARGE NOTE PROVIDER - CARE PROVIDER_API CALL
Brian López (DPM)  [facility]  [address] [place] [address]  Phone: [phone]  Fax: [phone]  Follow Up Time: 1 week    ROSEMARY MARAVILLA  Internal Medicine  [address] [place] [address]  Phone: [phone]  Fax: [phone]  Follow Up Time: 1 week    Kingsley Felipe (DPM)  Podiatric Medicine and Surgery  [address] [place] [address]  Phone: [phone]  Fax: [phone]  Follow Up Time: 1 week    Luciano Mosley  [address] [facility] [address] [address]  Phone: [phone]  Fax: (   )    -  Follow Up Time: 1 week    Shon Murphy)  Surgery  [address] [facility], [address]  [place] [address]  Phone: [phone]  Fax: [phone]  Follow Up Time: 1 week   Brian López (DPM)  [facility]  [address] [place] [address]  Phone: [phone]  Fax: [phone]  Follow Up Time: 1 week    ROSEMARY MARAVILLA  Internal Medicine  [address] [place] [address]  Phone: [phone]  Fax: [phone]  Follow Up Time: 1 week    Kingsley Felipe (DPM)  Podiatric Medicine and Surgery  [address] [place] [place] [address]  Phone: [phone]  Fax: [phone]  Follow Up Time: 1 week    Shon Murphy)  Surgery  [address] [facility], [address]  [place] [address]  Phone: [phone]  Fax: [phone]  Follow Up Time: 1 week    Luciano Mosley  [address] [facility] [address] [address]  Phone: [phone]  Fax: (   )    -  Follow Up Time: 1 week    Mely Don)  Internal Medicine  [address], [place] [address]  Phone: [phone]  Fax: [phone]  Follow Up Time:

## 2021-09-22 NOTE — DISCHARGE NOTE PROVIDER - NSDCCPCAREPLAN_GEN_ALL_CORE_FT
PRINCIPAL DISCHARGE DIAGNOSIS  Diagnosis: Right hallux osteomyelitis  Assessment and Plan of Treatment:       SECONDARY DISCHARGE DIAGNOSES  Diagnosis: Cellulitis of right leg  Assessment and Plan of Treatment:     Diagnosis: Pancreatic cancer  Assessment and Plan of Treatment:      PRINCIPAL DISCHARGE DIAGNOSIS  Diagnosis: Right hallux osteomyelitis  Assessment and Plan of Treatment: Admitted with infection of the bone in right great toe. You were treated with partial amputation of this toe/bone. You also received IV antibiotics and are being discharged on IV antibiotics through a peripherally inserted central catheter. Please follow up with your podiatrist within one week of discharge for continued management of your bone infection. Please follow up with primary care and/or skilled nursing for removal of your IV line.      SECONDARY DISCHARGE DIAGNOSES  Diagnosis: Cellulitis of right leg  Assessment and Plan of Treatment: Admitted with infection of your right leg. You were treated with IV antibiotics.    Diagnosis: Pancreatic cancer  Assessment and Plan of Treatment:      PRINCIPAL DISCHARGE DIAGNOSIS  Diagnosis: Right hallux osteomyelitis  Assessment and Plan of Treatment: Admitted with infection of the bone in right great toe. You were treated with partial amputation of this toe/bone. You also received IV antibiotics and are being discharged on IV antibiotics through a peripherally inserted central catheter. Please follow up with your podiatrist within one week of discharge for continued management of your bone infection. Please follow up with primary care and/or skilled nursing for removal of your IV line.      SECONDARY DISCHARGE DIAGNOSES  Diagnosis: Cellulitis of right leg  Assessment and Plan of Treatment: Admitted with infection of your right leg. You were treated with IV antibiotics. Please follow up with your podiatrist and PCP within one week discharge for continued management of infection. Please return to ED with persistent fevers over 100.3 degrees or pain that does not respond appropriately to your prescribed pain medications.    Diagnosis: Pancreatic cancer  Assessment and Plan of Treatment: You were admitted with a pre-existing diagnosis of pancreatic cancer. Please follow up with your oncologist and general surgeon within 1 week of discharge for continued management.     PRINCIPAL DISCHARGE DIAGNOSIS  Diagnosis: Right hallux osteomyelitis  Assessment and Plan of Treatment: Admitted with infection of the bone in right great toe. You were treated with partial amputation of this toe/bone. You also received IV antibiotics and are being discharged on IV antibiotics through a peripherally inserted central catheter. Please follow up with your podiatrist within one week of discharge for continued management of your bone infection. Please follow up with primary care and/or skilled nursing for removal of your IV line.      SECONDARY DISCHARGE DIAGNOSES  Diagnosis: Cellulitis of right leg  Assessment and Plan of Treatment: Admitted with infection of your right leg. You were treated with IV antibiotics. Please follow up with your podiatrist and PCP within one week discharge for continued management of infection. Please return to ED with persistent fevers over 100.3 degrees or pain that does not respond appropriately to your prescribed pain medications.    Diagnosis: Pancreatic cancer  Assessment and Plan of Treatment: You were admitted with a pre-existing diagnosis of pancreatic cancer. Please follow up with your oncologist and general surgeon within 1 week of discharge for continued management.    Diagnosis: MSSA bacteremia  Assessment and Plan of Treatment: Your initial blood cultures were positive for MSSA.  You have been receiving IV antibiotics for treatment.  Repeat blood cultures have been negative.  You are being transferred to [facility] to rule out endocarditis and to possibly have ICD extraction.

## 2021-09-22 NOTE — PROGRESS NOTE ADULT - SUBJECTIVE AND OBJECTIVE BOX
[facility]  INFECTIOUS DISEASES   [address] [place] [address]  Tel: [phone]     Fax: [phone]  =======================================================    [id number]  JUANIS DE SANTIAGO     Follow up: right hallux cellulitis and bacteremia     No complaint, had amputation of hallux.   No fever.   Had bone biopsy on 9/15, showing acute osteomyelitis.     PAST MEDICAL & SURGICAL HISTORY:  HTN (hypertension)  HLD (hyperlipidemia)  GERD (gastroesophageal reflux disease)  Cardiomyopathy  History of total hip replacement left  History of laminectomy  ICD (implantable cardiac defibrillator) in place  Benign testicular tumor  History of hip replacement    Social Hx: no smoking, ETOH or drugs     FAMILY HISTORY:  Family history of stroke (Mother)    Allergies  No Known Allergies    Antibiotics:  cefazolin      REVIEW OF SYSTEMS:  CONSTITUTIONAL:  No Fever or chills  HEENT:  No diplopia or blurred vision.  No sore throat or runny nose.  CARDIOVASCULAR:  No chest pain or SOB.  RESPIRATORY:  No cough, shortness of breath, PND or orthopnea.  GASTROINTESTINAL:  No nausea, vomiting or diarrhea.  GENITOURINARY:  No dysuria, frequency or urgency. No Blood in urine  MUSCULOSKELETAL: foot ulcer   SKIN:  No change in skin, hair or nails.  NEUROLOGIC:  No paresthesias, fasciculations, seizures or weakness.  PSYCHIATRIC:  No disorder of thought or mood.  ENDOCRINE:  No heat or cold intolerance, polyuria or polydipsia.  HEMATOLOGICAL:  No easy bruising or bleeding.     Physical Exam:  Vital Signs Last 24 Hrs  T(C): 36.6 (22 Sep 2021 12:12), Max: 36.6 (22 Sep 2021 05:36)  T(F): 97.9 (22 Sep 2021 12:12), Max: 97.9 (22 Sep 2021 05:36)  HR: 81 (22 Sep 2021 12:12) (81 - 95)  BP: 110/65 (22 Sep 2021 12:12) (97/57 - 122/68)  BP(mean): --  RR: 16 (22 Sep 2021 12:12) (16 - 17)  SpO2: 94% (22 Sep 2021 12:12) (94% - 95%)  GEN: NAD  HEENT: normocephalic and atraumatic. EOMI. PERRL.    NECK: Supple.  No lymphadenopathy   LUNGS: Clear to auscultation.  HEART: Regular rate and rhythm without murmur.  ABDOMEN: Soft, nontender, and nondistended.  Positive bowel sounds.    : No CVA tenderness  EXTREMITIES: R foot dressed after amputation of hallux   NEUROLOGIC: grossly intact.  PSYCHIATRIC: Appropriate affect .  SKIN: No ulceration or induration present.    Labs:                        8.3    4.04  )-----------( 213      ( 22 Sep 2021 07:31 )             26.5     09-22    138  |  104  |  8   ----------------------------<  127<H>  4.5   |  31  |  0.84    Ca    8.1<L>      22 Sep 2021 07:31    Culture - Tissue with Gram Stain (collected 09-21-21 @ 01:30)  Source: .Tissue Right hallux bone culutre  Gram Stain (09-21-21 @ 06:11):    Rare polymorphonuclear leukocytes seen per low power field    No organisms seen per oil power field    Culture - Tissue with Gram Stain (collected 09-21-21 @ 01:29)  Source: .Tissue right first metatarsal bone cu  Gram Stain (09-21-21 @ 06:11):    Rare polymorphonuclear leukocytes seen per low power field    No organisms seen per oil power field    Culture - Blood (collected 09-16-21 @ 12:01)  Source: .Blood Blood-Peripheral  Final Report (09-21-21 @ 13:00):    No Growth Final    Culture - Blood (collected 09-16-21 @ 12:01)  Source: .Blood Blood-Peripheral  Final Report (09-21-21 @ 13:00):    No Growth Final    Culture - Tissue with Gram Stain (collected 09-16-21 @ 01:16)  Source: .Tissue right hallux bone culture  Gram Stain (09-16-21 @ 04:48):    Rare polymorphonuclear leukocytes seen per low power field    No organisms seen per oil power field  Final Report (09-21-21 @ 10:11):    Few Staphylococcus aureus  Organism: Staphylococcus aureus (09-21-21 @ 10:11)  Organism: Staphylococcus aureus (09-21-21 @ 10:11)    Sensitivities:      -  Ampicillin/Sulbactam: S <=8/4      -  Cefazolin: S <=4      -  Clindamycin: S <=0.25      -  Erythromycin: S <=0.25      -  Gentamicin: S 2 Should not be used as monotherapy      -  Oxacillin: S <=0.25      -  RIF- Rifampin: S <=1 Should not be used as monotherapy      -  Tetra/Doxy: S <=1      -  Trimethoprim/Sulfamethoxazole: S <=0.5/9.5      -  Vancomycin: S 2      Method Type: TYSON    Culture - Urine (collected 09-15-21 @ 04:15)  Source: Clean Catch Clean Catch (Midstream)  Final Report (09-16-21 @ 00:06):    <10,000 CFU/mL Normal Urogenital Juani    Culture - Blood (collected 09-15-21 @ 01:20)  Source: .Blood Blood-Peripheral  Gram Stain (09-16-21 @ 01:09):    Growth in aerobic bottle: Gram Positive Cocci in Clusters    Growth in anaerobic bottle: Gram Positive Cocci in Clusters  Final Report (09-17-21 @ 16:59):    Growth in aerobic and anaerobic bottles: Staphylococcus aureus    ***Blood Panel PCR results on this specimen are available    approximately 3 hours after the Gram stain result.***    Gram stain, PCR, and/or culture results may not always    correspond dueto difference in methodologies.    ************************************************************    This PCR assay was performed by multiplex PCR. This    Assay tests for 66 bacterial and resistance gene targets.    Please refer to the [facility] test directory    at [email or web address] for details.  Organism: Blood Culture PCR  Staphylococcus aureus (09-17-21 @ 16:59)  Organism: Staphylococcus aureus (09-17-21 @ 16:59)    Sensitivities:      -  Ampicillin/Sulbactam: S <=8/4      -  Cefazolin: S <=4      -  Clindamycin: R <=0.25 This isolate is presumed to be clindamycin resistant based on detection of inducible resistance. Clindamycin may still be effective in some patients.      -  Erythromycin: I 1      -  Gentamicin: S <=1 Should not be used as monotherapy      -  Oxacillin: S 0.5      -  RIF- Rifampin: S <=1 Should not be used as monotherapy      -  Tetra/Doxy: S <=1      -  Trimethoprim/Sulfamethoxazole: S <=0.5/9.5      -  Vancomycin: S 2      Method Type: TYSON  Organism: Blood Culture PCR (09-17-21 @ 16:59)    Sensitivities:      -  Staphylococcus aureus: Detec Any isolate of Staphylococcus aureus from a blood culture is NOT considered a contaminant.      Method Type: PCR    Culture - Blood (collected 09-15-21 @ 01:20)  Source: .Blood Blood-Peripheral  Gram Stain (09-15-21 @ 22:34):    Growth in aerobic bottle: Gram Positive Cocci in Clusters  Final Report (09-17-21 @ 17:41):    Growth in aerobic bottle: Staphylococcus aureus    See previous culture [id number]    WBC Count: 4.04 K/uL (09-22-21 @ 07:31)  WBC Count: 4.44 K/uL (09-21-21 @ 07:19)  WBC Count: 3.44 K/uL (09-20-21 @ 07:22)  WBC Count: 4.10 K/uL (09-19-21 @ 07:37)  WBC Count: 4.66 K/uL (09-19-21 @ 00:19)  WBC Count: 4.05 K/uL (09-18-21 @ 09:00)    Creatinine, Serum: 0.84 mg/dL (09-22-21 @ 07:31)  Creatinine, Serum: 0.69 mg/dL (09-21-21 @ 18:33)  Creatinine, Serum: 0.60 mg/dL (09-20-21 @ 07:22)  Creatinine, Serum: 0.78 mg/dL (09-19-21 @ 07:37)  Creatinine, Serum: 0.66 mg/dL (09-18-21 @ 09:00)    C-Reactive Protein, Serum: 168 mg/L (09-15-21 @ 07:50)    Ferritin, Serum: 585 ng/mL (09-15-21 @ 11:30)    COVID-19 PCR: NotDetec (09-20-21 @ 12:59)  COVID-19 PCR: NotDetec (09-14-21 @ 20:17)    All imaging and other data have been reviewed.  < from: Xray Tibia + Fibula 2 Views, Right (09.14.21 @ 20:54) >  IMPRESSION: No acute chest finding. Ankle edema and degenerative changes in the foot again seen. Fairly advanced right knee degeneration also again noted.    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 Patient states he was advised to go the wound care center by his oncologist at [facility] due to increased redness and edema to right lower leg.     ESR 53  Xray with degenerative changes   s/p debridement and bone biopsy on 9/15 with acute OM    R lower leg cellulitis, R hallux wound    Recommendations:   - Blood culture with MSSA on 9/15  - Blood culture NGTD on 9/16  - Wound culture MSSA  - S/P R 1st toe amputation on 9/20, culture so far negative will follow.   - Continue cefazolin 2gm q8h  - TTE negative, CORBIN due to history of TAVR is indicated.   - Even with clean amputation margines will treat for 6 weeks unless CORBIN done and is negative.  - PICC in place.      Will follow.    Jaren Bell MD  Division of Infectious Diseases   Cell [phone] between 8am and 6pm   After 6pm and weekends please call ID service at [phone].

## 2021-09-22 NOTE — PROGRESS NOTE ADULT - ASSESSMENT
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    Cellulitis of right lower leg and Acute OM of right hallux  - Tolerated procedure well, cardiac status optimal post operatively  - Euvolemic on exam.  - Pain control  - MSSA bacteremia on admission BCx; repeat BCx from 9/16 are NGTD  - Surgical pathology of right hallux: +acute Osteomyelitis, staph aureus   - Podiatry/wound care follow up  - ID recs     Pulmonary embolism.   -  Bilateral PE in 7/2020, on Xarelto     Chronic systolic HF S/P ICD  - Echo as above revealing not well visualized LV, severe Mitral stenosis, left atrial enlargement   - ICD interrogation completed, no shocks recorded, ICD functioning battery life 2.5 years   - Continue Lasix and Entresto   - No signs of acute volume overload. Tolerating Room air  - Strict I/Os, daily weights  - Monitor and replace electrolytes.  - Unclear why on sotalol , continue need outpatient records     Anemia  - Likely anemia of chronic disease in the setting of pancreatic cancer    Pancreatic Ca  - Patient was scheduled for Whipple's procedure on 9/23/2021 at [facility]   - Management per primary team     - Monitor and replete lytes, keep K>4, Mg>2.  - All other medical needs as per primary team.  - Other cardiovascular workup will depend on clinical course.  - Will continue to follow.    Miriam Trna FNP-C  Cardiology NP  [phone]

## 2021-09-22 NOTE — PROGRESS NOTE ADULT - SUBJECTIVE AND OBJECTIVE BOX
Patient is a 80y old  Male who presents with a chief complaint of right hallux ulcer/cellulitis (22 Sep 2021 11:41)      Subjective:  INTERVAL HPI/OVERNIGHT EVENTS: Patient seen and examined at bedside. No overnight events occurred. Patient has no complaints at this time. Denies fevers, chills, headache, lightheadedness, chest pain, dyspnea, abdominal pain, n/v/d/c.    MEDICATIONS  (STANDING):  ceFAZolin   IVPB 2000 milliGRAM(s) IV Intermittent every 8 hours  cholecalciferol 400 Unit(s) Oral daily  dorzolamide 2%/timolol 0.5% Ophthalmic Solution 1 Drop(s) Both EYES two times a day  DULoxetine 60 milliGRAM(s) Oral daily  furosemide    Tablet 20 milliGRAM(s) Oral daily  gabapentin 300 milliGRAM(s) Oral two times a day  ketorolac 0.5% Ophthalmic Solution 1 Drop(s) Both EYES daily  latanoprost 0.005% Ophthalmic Solution 1 Drop(s) Both EYES at bedtime  lidocaine   4% Patch 1 Patch Transdermal daily  melatonin 5 milliGRAM(s) Oral at bedtime  multivitamin 1 Tablet(s) Oral daily  pancrelipase  (CREON 36,000 Lipase Units) 2 Capsule(s) Oral three times a day with meals  pantoprazole    Tablet 40 milliGRAM(s) Oral before breakfast  pyridoxine 100 milliGRAM(s) Oral daily  rivaroxaban 20 milliGRAM(s) Oral with dinner  sacubitril 49 mG/valsartan 51 mG 1 Tablet(s) Oral two times a day  sotalol 40 milliGRAM(s) Oral two times a day  tamsulosin 0.4 milliGRAM(s) Oral at bedtime    MEDICATIONS  (PRN):  acetaminophen   Tablet .. 650 milliGRAM(s) Oral every 6 hours PRN Temp greater or equal to 38C (100.4F), Mild Pain (1 - 3)  ALPRAZolam 0.25 milliGRAM(s) Oral at bedtime PRN anxiety  oxycodone    5 mG/acetaminophen 325 mG 1 Tablet(s) Oral every 6 hours PRN Moderate Pain (4 - 6)  prochlorperazine   Tablet 10 milliGRAM(s) Oral daily PRN nausea or vomiting      Allergies    No Known Allergies    Intolerances        REVIEW OF SYSTEMS:  CONSTITUTIONAL: No fever or chills  HEENT:  No headache, no sore throat  RESPIRATORY: No cough, wheezing, or shortness of breath  CARDIOVASCULAR: No chest pain, palpitations  GASTROINTESTINAL: No abd pain, nausea, vomiting, or diarrhea  GENITOURINARY: No dysuria, frequency, or hematuria  NEUROLOGICAL: no focal weakness or dizziness  MUSCULOSKELETAL: no myalgias     Objective:  Vital Signs Last 24 Hrs  T(C): 36.6 (22 Sep 2021 12:12), Max: 36.6 (22 Sep 2021 05:36)  T(F): 97.9 (22 Sep 2021 12:12), Max: 97.9 (22 Sep 2021 05:36)  HR: 81 (22 Sep 2021 12:12) (81 - 95)  BP: 110/65 (22 Sep 2021 12:12) (97/57 - 122/68)  BP(mean): --  RR: 16 (22 Sep 2021 12:12) (16 - 17)  SpO2: 94% (22 Sep 2021 12:12) (94% - 95%)    GENERAL: NAD, lying in bed comfortably  HEAD:  Atraumatic, Normocephalic  EYES: EOMI, PERRLA, conjunctiva and sclera clear  ENT: Moist mucous membranes  NECK: Supple, No JVD  CHEST/LUNG: Clear to auscultation bilaterally; No rales, rhonchi, wheezing, or rubs. Unlabored respirations  HEART: Regular rate and rhythm; No murmurs, rubs, or gallops  ABDOMEN: Bowel sounds present; Soft, Nontender, Nondistended. No hepatomegaly  EXTREMITIES:  2+ Peripheral Pulses, brisk capillary refill. No clubbing, cyanosis, or edema  NERVOUS SYSTEM:  Alert & Oriented X3, speech clear. No deficits   MSK: FROM all 4 extremities, full and equal strength  SKIN: No rashes or lesions    LABS:                        8.3    4.04  )-----------( 213      ( 22 Sep 2021 07:31 )             26.5     CBC Full  -  ( 22 Sep 2021 07:31 )  WBC Count : 4.04 K/uL  Hemoglobin : 8.3 g/dL  Hematocrit : 26.5 %  Platelet Count - Automated : 213 K/uL  Mean Cell Volume : 106.0 fl  Mean Cell Hemoglobin : 33.2 pg  Mean Cell Hemoglobin Concentration : 31.3 gm/dL  Auto Neutrophil # : x  Auto Lymphocyte # : x  Auto Monocyte # : x  Auto Eosinophil # : x  Auto Basophil # : x  Auto Neutrophil % : x  Auto Lymphocyte % : x  Auto Monocyte % : x  Auto Eosinophil % : x  Auto Basophil % : x    22 Sep 2021 07:31    138    |  104    |  8      ----------------------------<  127    4.5     |  31     |  0.84     Ca    8.1        22 Sep 2021 07:31      PTT - ( 21 Sep 2021 16:03 )  PTT:117.9 sec    CAPILLARY BLOOD GLUCOSE            Culture - Tissue with Gram Stain (collected 09-21-21 @ 01:30)  Source: .Tissue Right hallux bone culutre  Gram Stain (09-21-21 @ 06:11):    Rare polymorphonuclear leukocytes seen per low power field    No organisms seen per oil power field  Preliminary Report (09-21-21 @ 21:32):    No growth    Culture - Tissue with Gram Stain (collected 09-21-21 @ 01:29)  Source: .Tissue right first metatarsal bone cu  Gram Stain (09-21-21 @ 06:11):    Rare polymorphonuclear leukocytes seen per low power field    No organisms seen per oil power field  Preliminary Report (09-21-21 @ 21:32):    No growth    Culture - Blood (collected 09-16-21 @ 12:01)  Source: .Blood Blood-Peripheral  Final Report (09-21-21 @ 13:00):    No Growth Final    Culture - Blood (collected 09-16-21 @ 12:01)  Source: .Blood Blood-Peripheral  Final Report (09-21-21 @ 13:00):    No Growth Final    Culture - Tissue with Gram Stain (collected 09-16-21 @ 01:16)  Source: .Tissue right hallux bone culture  Gram Stain (09-16-21 @ 04:48):    Rare polymorphonuclear leukocytes seen per low power field    No organisms seen per oil power field  Final Report (09-21-21 @ 10:11):    Few Staphylococcus aureus  Organism: Staphylococcus aureus (09-21-21 @ 10:11)  Organism: Staphylococcus aureus (09-21-21 @ 10:11)      -  Ampicillin/Sulbactam: S <=8/4      -  Cefazolin: S <=4      -  Clindamycin: S <=0.25      -  Erythromycin: S <=0.25      -  Gentamicin: S 2 Should not be used as monotherapy      -  Oxacillin: S <=0.25      -  RIF- Rifampin: S <=1 Should not be used as monotherapy      -  Tetra/Doxy: S <=1      -  Trimethoprim/Sulfamethoxazole: S <=0.5/9.5      -  Vancomycin: S 2      Method Type: TYSON        RADIOLOGY & ADDITIONAL TESTS:    Personally reviewed.     Consultant(s) Notes Reviewed:  [x] YES  [ ] NO     Patient is a 80y old  Male who presents with a chief complaint of right hallux ulcer/cellulitis (22 Sep 2021 11:41)      Subjective:  INTERVAL HPI/OVERNIGHT EVENTS: Patient seen and examined at bedside. No overnight events occurred. Patient has no complaints at this time. Denies fevers, chills, headache, lightheadedness, chest pain, dyspnea, abdominal pain, n/v/d/c.    MEDICATIONS  (STANDING):  ceFAZolin   IVPB 2000 milliGRAM(s) IV Intermittent every 8 hours  cholecalciferol 400 Unit(s) Oral daily  dorzolamide 2%/timolol 0.5% Ophthalmic Solution 1 Drop(s) Both EYES two times a day  DULoxetine 60 milliGRAM(s) Oral daily  furosemide    Tablet 20 milliGRAM(s) Oral daily  gabapentin 300 milliGRAM(s) Oral two times a day  ketorolac 0.5% Ophthalmic Solution 1 Drop(s) Both EYES daily  latanoprost 0.005% Ophthalmic Solution 1 Drop(s) Both EYES at bedtime  lidocaine   4% Patch 1 Patch Transdermal daily  melatonin 5 milliGRAM(s) Oral at bedtime  multivitamin 1 Tablet(s) Oral daily  pancrelipase  (CREON 36,000 Lipase Units) 2 Capsule(s) Oral three times a day with meals  pantoprazole    Tablet 40 milliGRAM(s) Oral before breakfast  pyridoxine 100 milliGRAM(s) Oral daily  rivaroxaban 20 milliGRAM(s) Oral with dinner  sacubitril 49 mG/valsartan 51 mG 1 Tablet(s) Oral two times a day  sotalol 40 milliGRAM(s) Oral two times a day  tamsulosin 0.4 milliGRAM(s) Oral at bedtime    MEDICATIONS  (PRN):  acetaminophen   Tablet .. 650 milliGRAM(s) Oral every 6 hours PRN Temp greater or equal to 38C (100.4F), Mild Pain (1 - 3)  ALPRAZolam 0.25 milliGRAM(s) Oral at bedtime PRN anxiety  oxycodone    5 mG/acetaminophen 325 mG 1 Tablet(s) Oral every 6 hours PRN Moderate Pain (4 - 6)  prochlorperazine   Tablet 10 milliGRAM(s) Oral daily PRN nausea or vomiting      Allergies    No Known Allergies    Intolerances        REVIEW OF SYSTEMS:  CONSTITUTIONAL: No fever or chills  HEENT:  No headache, no sore throat  RESPIRATORY: No cough, wheezing, or shortness of breath  CARDIOVASCULAR: No chest pain, palpitations  GASTROINTESTINAL: No abd pain, nausea, vomiting, or diarrhea  GENITOURINARY: No dysuria, frequency, or hematuria  NEUROLOGICAL: no focal weakness or dizziness  MUSCULOSKELETAL: +R foot pain    Objective:  Vital Signs Last 24 Hrs  T(C): 36.6 (22 Sep 2021 12:12), Max: 36.6 (22 Sep 2021 05:36)  T(F): 97.9 (22 Sep 2021 12:12), Max: 97.9 (22 Sep 2021 05:36)  HR: 81 (22 Sep 2021 12:12) (81 - 95)  BP: 110/65 (22 Sep 2021 12:12) (97/57 - 122/68)  BP(mean): --  RR: 16 (22 Sep 2021 12:12) (16 - 17)  SpO2: 94% (22 Sep 2021 12:12) (94% - 95%)    GENERAL: NAD  HEENT:  anicteric, moist mucous membranes  CHEST/LUNG:  CTA b/l, no rales, wheezes, or rhonchi  HEART:  RRR, S1/S2, +syst murmur  ABDOMEN:  BS+, soft overall but firm in epigastric region, nontender, nondistended  EXTREMITIES: no cyanosis or calf tenderness  NERVOUS SYSTEM: answers questions and follows commands appropriately  MSK: R foot wrapped in ace bandage Dressing C/D/I. SILT over distal aspect of right foot/phalanges. Calves soft, nontender, chronic swelling of posterior left elbow, soft, non tender (chronic per pt)    LABS:                        8.3    4.04  )-----------( 213      ( 22 Sep 2021 07:31 )             26.5     CBC Full  -  ( 22 Sep 2021 07:31 )  WBC Count : 4.04 K/uL  Hemoglobin : 8.3 g/dL  Hematocrit : 26.5 %  Platelet Count - Automated : 213 K/uL  Mean Cell Volume : 106.0 fl  Mean Cell Hemoglobin : 33.2 pg  Mean Cell Hemoglobin Concentration : 31.3 gm/dL  Auto Neutrophil # : x  Auto Lymphocyte # : x  Auto Monocyte # : x  Auto Eosinophil # : x  Auto Basophil # : x  Auto Neutrophil % : x  Auto Lymphocyte % : x  Auto Monocyte % : x  Auto Eosinophil % : x  Auto Basophil % : x    22 Sep 2021 07:31    138    |  104    |  8      ----------------------------<  127    4.5     |  31     |  0.84     Ca    8.1        22 Sep 2021 07:31      PTT - ( 21 Sep 2021 16:03 )  PTT:117.9 sec    CAPILLARY BLOOD GLUCOSE            Culture - Tissue with Gram Stain (collected 09-21-21 @ 01:30)  Source: .Tissue Right hallux bone culutre  Gram Stain (09-21-21 @ 06:11):    Rare polymorphonuclear leukocytes seen per low power field    No organisms seen per oil power field  Preliminary Report (09-21-21 @ 21:32):    No growth    Culture - Tissue with Gram Stain (collected 09-21-21 @ 01:29)  Source: .Tissue right first metatarsal bone cu  Gram Stain (09-21-21 @ 06:11):    Rare polymorphonuclear leukocytes seen per low power field    No organisms seen per oil power field  Preliminary Report (09-21-21 @ 21:32):    No growth    Culture - Blood (collected 09-16-21 @ 12:01)  Source: .Blood Blood-Peripheral  Final Report (09-21-21 @ 13:00):    No Growth Final    Culture - Blood (collected 09-16-21 @ 12:01)  Source: .Blood Blood-Peripheral  Final Report (09-21-21 @ 13:00):    No Growth Final    Culture - Tissue with Gram Stain (collected 09-16-21 @ 01:16)  Source: .Tissue right hallux bone culture  Gram Stain (09-16-21 @ 04:48):    Rare polymorphonuclear leukocytes seen per low power field    No organisms seen per oil power field  Final Report (09-21-21 @ 10:11):    Few Staphylococcus aureus  Organism: Staphylococcus aureus (09-21-21 @ 10:11)  Organism: Staphylococcus aureus (09-21-21 @ 10:11)      -  Ampicillin/Sulbactam: S <=8/4      -  Cefazolin: S <=4      -  Clindamycin: S <=0.25      -  Erythromycin: S <=0.25      -  Gentamicin: S 2 Should not be used as monotherapy      -  Oxacillin: S <=0.25      -  RIF- Rifampin: S <=1 Should not be used as monotherapy      -  Tetra/Doxy: S <=1      -  Trimethoprim/Sulfamethoxazole: S <=0.5/9.5      -  Vancomycin: S 2      Method Type: TYSON        RADIOLOGY & ADDITIONAL TESTS:    Personally reviewed.     Consultant(s) Notes Reviewed:  [x] YES  [ ] NO

## 2021-09-22 NOTE — DISCHARGE NOTE PROVIDER - NPI NUMBER (FOR [employer] USE ONLY) :
[[id number]],[[id number]],[[id number]],[[id number]],[[id number]] [[id number]],[[id number]],[[id number]],[[id number]],[UNKNOWN],[[id number]]

## 2021-09-22 NOTE — DISCHARGE NOTE PROVIDER - HOSPITAL COURSE
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 Patient states he was advised to go the wound care center by his oncologist at [facility] due to increased redness and edema to right lower leg. Pt follows podiatry, Dr. Felipe for right toe wound. Pt was seen by podiatry at the wound care center and was sent to the ED for further management. Denies fever, chills, chest pain, shortness of breath, abdominal pain, nausea, vomiting.    In the ED,  Vital Signs T(F): 100.9 HR: 91 BP: 125/67 RR: 18 SpO2: 100%  Labs significant for: ESR 53, H/H 9.8/30, INR 2.82, Na 132, Tbili 1.3  CXR: no acute infiltrates, hardware noted  XR right foot: no gas seen - soft tissue swelling by hallux  EKG: SR at 97bpm, LBBB (14 Sep 2021 23:11)      ---  HOSPITAL COURSE/PERTINENT LABS/PROCEDURES PERFORMED/PENDING TESTS:   Pt was admitted for right hallux pressure injury and right lower extremity cellulitis. Podiatry was consulted. Infectious disease was consulted. Blood cultures were drawn and resulted positive for MSSA. Right Hallux Bone biopsy and Wound Debridement at bedside with Dr. López on 9/15/2021 demonstrated osteomyelitis. The patient scheduled for Right 1st Partial Ray Resection. The risks and benefits of the procedure were discussed with the patient. The patient expressed understanding of the risks and wished to continue with the procedure. The patient gave consent prior to the procedure. All of the patient's questions and concerns were answered and addressed. The patient received both medical and cardiac optimization and clearance prior to the procedure specified above. Once optimized, the patient underwent 1st partial ray resection. The procedure went as planned without complications. Following the procedure, the patient received a right brachial PICC by interventional radiology for prolonged antibiotics.    Patient is stable for discharge as per primary medical team and consultants.    PT consulted, recommends discharge [facility]    Patient showed improvement throughout hospitalization. Patient was seen and examined on day of discharge. Patient was medically optimized for discharge with close outpatient follow up.    ---  PATIENT CONDITION:  - stable    --  VITALS:   T(C): 36.6 (09-22-21 @ 12:12), Max: 36.6 (09-22-21 @ 05:36)  HR: 81 (09-22-21 @ 12:12) (81 - 95)  BP: 110/65 (09-22-21 @ 12:12) (97/57 - 122/68)  RR: 16 (09-22-21 @ 12:12) (16 - 17)  SpO2: 94% (09-22-21 @ 12:12) (94% - 95%)      PHYSICAL EXAM ON DAY OF DISCHARGE:  GENERAL: NAD  HEENT:  anicteric, moist mucous membranes  CHEST/LUNG:  CTA b/l, no rales, wheezes, or rhonchi  HEART:  RRR, S1/S2, +syst murmur  ABDOMEN:  BS+, soft overall but firm in epigastric region, nontender, nondistended  EXTREMITIES: no cyanosis or calf tenderness  NERVOUS SYSTEM: answers questions and follows commands appropriately  MSK: R foot wrapped in ace bandage Dressing C/D/I. SILT over distal aspect of right foot/phalanges. Calves soft, nontender, chronic swelling of posterior left elbow, soft, non tender (chronic per pt)      ---  CONSULTANTS:   -  Podiatry: Dr. López  Infectious disease: Dr. Bell   Cardiology: Dr. Mata   Interventional radiology: Dr. George    ---  ADVANCED CARE PLANNING:  - Code status:      - MOLST completed:      [  ] NO     [  ] YES    ---  TIME SPENT:  I, the attending physician, was physically present for the key portions of the evaluation and management (E/M) service provided. 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 Patient states he was advised to go the wound care center by his oncologist at [place] due to increased redness and edema to right lower leg. Pt follows podiatry, Dr. Felipe for right toe wound. Pt was seen by podiatry at the wound care center and was sent to the ED for further management. Denies fever, chills, chest pain, shortness of breath, abdominal pain, nausea, vomiting.    In the ED,  Vital Signs T(F): 100.9 HR: 91 BP: 125/67 RR: 18 SpO2: 100%  Labs significant for: ESR 53, H/H 9.8/30, INR 2.82, Na 132, Tbili 1.3  CXR: no acute infiltrates, hardware noted  XR right foot: no gas seen - soft tissue swelling by hallux  EKG: SR at 97bpm, LBBB (14 Sep 2021 23:11)      ---  HOSPITAL COURSE/PERTINENT LABS/PROCEDURES PERFORMED/PENDING TESTS:   Pt was admitted for right hallux pressure injury and right lower extremity cellulitis. Podiatry was consulted. Infectious disease was consulted. Blood cultures were drawn and resulted positive for MSSA. Right Hallux Bone biopsy and Wound Debridement at bedside with Dr. López on 9/15/2021 demonstrated osteomyelitis. The patient scheduled for Right 1st Partial Ray Resection. The risks and benefits of the procedure were discussed with the patient. The patient expressed understanding of the risks and wished to continue with the procedure. The patient gave consent prior to the procedure. All of the patient's questions and concerns were answered and addressed. The patient received both medical and cardiac optimization and clearance prior to the procedure specified above. Once optimized, the patient underwent 1st partial ray resection. The procedure went as planned without complications. Following the procedure, the patient received a right brachial vein PICC by interventional radiology for prolonged antibiotics.    Patient is stable for discharge as per primary medical team and consultants.    PT consulted, recommends discharge [facility]    Patient showed improvement throughout hospitalization. Patient was seen and examined on day of discharge. Patient was medically optimized for discharge with close outpatient follow up.    ---  PATIENT CONDITION:  - stable    --  VITALS:   T(C): 36.6 (09-22-21 @ 12:12), Max: 36.6 (09-22-21 @ 05:36)  HR: 81 (09-22-21 @ 12:12) (81 - 95)  BP: 110/65 (09-22-21 @ 12:12) (97/57 - 122/68)  RR: 16 (09-22-21 @ 12:12) (16 - 17)  SpO2: 94% (09-22-21 @ 12:12) (94% - 95%)      PHYSICAL EXAM ON DAY OF DISCHARGE:  GENERAL: NAD  HEENT:  anicteric, moist mucous membranes  CHEST/LUNG:  CTA b/l, no rales, wheezes, or rhonchi  HEART:  RRR, S1/S2, +syst murmur  ABDOMEN:  BS+, soft overall but firm in epigastric region, nontender, nondistended  EXTREMITIES: no cyanosis or calf tenderness  NERVOUS SYSTEM: answers questions and follows commands appropriately  MSK: R foot wrapped in ace bandage Dressing C/D/I. SILT over distal aspect of right foot/phalanges. Calves soft, nontender, chronic swelling of posterior left elbow, soft, non tender (chronic per pt)      ---  CONSULTANTS:   -  Podiatry: Dr. López  Infectious disease: Dr. Bell   Cardiology: Dr. Mata   Interventional radiology: Dr. George    ---  ADVANCED CARE PLANNING:  - Code status:      - MOLST completed:      [  ] NO     [  ] YES    ---  TIME SPENT:  I, the attending physician, was physically present for the key portions of the evaluation and management (E/M) service provided. 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 Patient states he was advised to go the wound care center by his oncologist at [place] due to increased redness and edema to right lower leg. Pt follows podiatry, Dr. Felipe for right toe wound. Pt was seen by podiatry at the wound care center and was sent to the ED for further management. Denies fever, chills, chest pain, shortness of breath, abdominal pain, nausea, vomiting.    In the ED,  Vital Signs T(F): 100.9 HR: 91 BP: 125/67 RR: 18 SpO2: 100%  Labs significant for: ESR 53, H/H 9.8/30, INR 2.82, Na 132, Tbili 1.3  CXR: no acute infiltrates, hardware noted  XR right foot: no gas seen - soft tissue swelling by hallux  EKG: SR at 97bpm, LBBB (14 Sep 2021 23:11)      ---  HOSPITAL COURSE/PERTINENT LABS/PROCEDURES PERFORMED/PENDING TESTS:   Pt was admitted for right hallux pressure injury and right lower extremity cellulitis. Podiatry was consulted. Infectious disease was consulted. Blood cultures were drawn and resulted positive for MSSA. Right Hallux Bone biopsy and Wound Debridement at bedside with Dr. López on 9/15/2021 demonstrated osteomyelitis. The patient scheduled for Right 1st Partial Ray Resection. The risks and benefits of the procedure were discussed with the patient. The patient expressed understanding of the risks and wished to continue with the procedure. The patient gave consent prior to the procedure. All of the patient's questions and concerns were answered and addressed. The patient received both medical and cardiac optimization and clearance prior to the procedure specified above. Once optimized, the patient underwent 1st partial ray resection. The procedure went as planned without complications. On POD#2, the patient received a right brachial vein PICC by interventional radiology for prolonged antibiotics. On POD#3, rapid response was called for bleeding from the surgical site which occurred during physical therapy. The surgical incision over the right 1st metatarsal was opened and hematoma was evacuated. The wound was left open to heal by secondary intention, filled with iodoform packing, and dressed. The patient is to be transferred to [facility] for evaluation of cardiac valves for infection via Transesophageal Echocardiogram.     Patient is stable for discharge as per primary medical team and consultants.    PT consulted, recommends discharge [facility]    Patient showed improvement throughout hospitalization. Patient was seen and examined on day of discharge. Patient was medically optimized for discharge with close outpatient follow up.    ---  PATIENT CONDITION:  - stable    --  VITAL SIGNS:  T(C): 36.4 (09-23-21 @ 05:31), Max: 36.5 (09-22-21 @ 21:59)  HR: 77 (09-23-21 @ 05:31) (77 - 85)  BP: 113/66 (09-23-21 @ 05:31) (112/68 - 137/75)  RR: 16 (09-23-21 @ 05:31) (16 - 16)  SpO2: 96% (09-23-21 @ 05:31) (95% - 96%)      PHYSICAL EXAM ON DAY OF DISCHARGE:  GENERAL: NAD  HEENT:  anicteric, moist mucous membranes  CHEST/LUNG:  CTA b/l, no rales, wheezes, or rhonchi  HEART:  RRR, S1/S2, +syst murmur  ABDOMEN:  BS+, soft overall but firm in epigastric region, nontender, nondistended  EXTREMITIES: no cyanosis or calf tenderness  NERVOUS SYSTEM: answers questions and follows commands appropriately  MSK: R foot wrapped in ace bandage Dressing C/D/I. SILT over distal aspect of right foot/phalanges. Calves soft, nontender, chronic swelling of posterior left elbow, soft, non tender (chronic per pt)      ---  CONSULTANTS:   -  Podiatry: Dr. López  Infectious disease: Dr. Bell   Cardiology: Dr. Mata   Interventional radiology: Dr. George    ---  ADVANCED CARE PLANNING:  - Code status:      - MOLST completed:      [  ] NO     [  ] YES    ---  TIME SPENT:  I, the attending physician, was physically present for the key portions of the evaluation and management (E/M) service provided. 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 Patient states he was advised to go the wound care center by his oncologist at [place] due to increased redness and edema to right lower leg. Pt follows podiatry, Dr. Felipe for right toe wound. Pt was seen by podiatry at the wound care center and was sent to the ED for further management. Denies fever, chills, chest pain, shortness of breath, abdominal pain, nausea, vomiting.    In the ED,  Vital Signs T(F): 100.9 HR: 91 BP: 125/67 RR: 18 SpO2: 100%  Labs significant for: ESR 53, H/H 9.8/30, INR 2.82, Na 132, Tbili 1.3  CXR: no acute infiltrates, hardware noted  XR right foot: no gas seen - soft tissue swelling by hallux  EKG: SR at 97bpm, LBBB (14 Sep 2021 23:11)      ---  HOSPITAL COURSE/PERTINENT LABS/PROCEDURES PERFORMED/PENDING TESTS:   Pt was admitted for right hallux pressure injury and right lower extremity cellulitis. Podiatry was consulted. Infectious disease was consulted. Blood cultures were drawn and resulted positive for MSSA. Right Hallux Bone biopsy and Wound Debridement at bedside with Dr. López on 9/15/2021 demonstrated osteomyelitis. The patient scheduled for Right 1st Partial Ray Resection. The risks and benefits of the procedure were discussed with the patient. The patient expressed understanding of the risks and wished to continue with the procedure. The patient gave consent prior to the procedure. All of the patient's questions and concerns were answered and addressed. The patient received both medical and cardiac optimization and clearance prior to the procedure specified above. Once optimized, the patient underwent 1st partial ray resection. The procedure went as planned without complications. On POD#2, the patient received a right brachial vein PICC by interventional radiology for prolonged antibiotics.  Surgical pathology showed osteomyelitis extending into the margin.   On POD#3, rapid response was called for bleeding from the surgical site which occurred during physical therapy. The surgical incision over the right 1st metatarsal was opened and hematoma was evacuated. The wound was left open to heal by secondary intention, filled with iodoform packing, and dressed. The patient is to be transferred [facility] for evaluation of cardiac valves for infection via Transesophageal Echocardiogram and possible ICD extraction.     Patient is stable for discharge as per primary medical team and consultants.    PT consulted, recommends discharge [facility]    Patient showed improvement throughout hospitalization. Patient was seen and examined on day of discharge. Patient was medically optimized for discharge with close outpatient follow up.    ---  PATIENT CONDITION:  - stable      ---  CONSULTANTS:   -  Podiatry: Dr. López  Infectious disease: Dr. Bell   Cardiology: Dr. Mata   Interventional radiology: Dr. George    ---  ADVANCED CARE PLANNING:  - Code status:      - MOLST completed:      [  ] NO     [  ] YES    ---  TIME SPENT:  I, the attending physician, was physically present for the key portions of the evaluation and management (E/M) service provided. 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 Patient states he was advised to go the wound care center by his oncologist at [place] due to increased redness and edema to right lower leg. Pt follows podiatry, Dr. Felipe for right toe wound. Pt was seen by podiatry at the wound care center and was sent to the ED for further management. Denies fever, chills, chest pain, shortness of breath, abdominal pain, nausea, vomiting.    In the ED,  Vital Signs T(F): 100.9 HR: 91 BP: 125/67 RR: 18 SpO2: 100%  Labs significant for: ESR 53, H/H 9.8/30, INR 2.82, Na 132, Tbili 1.3  CXR: no acute infiltrates, hardware noted  XR right foot: no gas seen - soft tissue swelling by hallux  EKG: SR at 97bpm, LBBB (14 Sep 2021 23:11)      ---  HOSPITAL COURSE/PERTINENT LABS/PROCEDURES PERFORMED/PENDING TESTS:   Pt was admitted for right hallux pressure injury and right lower extremity cellulitis. Podiatry was consulted. Infectious disease was consulted. Blood cultures were drawn and resulted positive for MSSA. Right Hallux Bone biopsy and Wound Debridement at bedside with Dr. López on 9/15/2021 demonstrated osteomyelitis. The patient scheduled for Right 1st Partial Ray Resection. The risks and benefits of the procedure were discussed with the patient. The patient expressed understanding of the risks and wished to continue with the procedure. The patient gave consent prior to the procedure. All of the patient's questions and concerns were answered and addressed. The patient received both medical and cardiac optimization and clearance prior to the procedure specified above. Once optimized, the patient underwent 1st partial ray resection. The procedure went as planned without complications. On POD#2, the patient received a right brachial vein PICC by interventional radiology for prolonged antibiotics.  Surgical pathology showed osteomyelitis extending into the margin.   On POD#3, rapid response was called for bleeding from the surgical site which occurred during physical therapy. The surgical incision over the right 1st metatarsal was opened and hematoma was evacuated. The wound was left open to heal by secondary intention, filled with iodoform packing, and dressed. The patient is to be transferred to [facility] for evaluation of cardiac valves for infection via Transesophageal Echocardiogram and possible ICD extraction. Xarelto was discontinued in anticipation for CORBIN and ICD extraction.  Pt. started on heparin drip.     Patient is stable for discharge as per primary medical team and consultants.    PT consulted, recommends discharge [facility]    Patient showed improvement throughout hospitalization. Patient was seen and examined on day of discharge. Patient was medically optimized for discharge with close outpatient follow up.    ---  PATIENT CONDITION:  - stable      ---  CONSULTANTS:   -  Podiatry: Dr. López  Infectious disease: Dr. Bell   Cardiology: Dr. Mata   Interventional radiology: Dr. George    ---  ADVANCED CARE PLANNING:  - Code status:      - MOLST completed:      [  ] NO     [  ] YES    ---  TIME SPENT:  I, the attending physician, was physically present for the key portions of the evaluation and management (E/M) service provided. 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 Patient states he was advised to go the wound care center by his oncologist at [place] due to increased redness and edema to right lower leg. Pt follows podiatry, Dr. Felipe for right toe wound. Pt was seen by podiatry at the wound care center and was sent to the ED for further management. Denies fever, chills, chest pain, shortness of breath, abdominal pain, nausea, vomiting.    In the ED,  Vital Signs T(F): 100.9 HR: 91 BP: 125/67 RR: 18 SpO2: 100%  Labs significant for: ESR 53, H/H 9.8/30, INR 2.82, Na 132, Tbili 1.3  CXR: no acute infiltrates, hardware noted  XR right foot: no gas seen - soft tissue swelling by hallux  EKG: SR at 97bpm, LBBB (14 Sep 2021 23:11)      ---  HOSPITAL COURSE/PERTINENT LABS/PROCEDURES PERFORMED/PENDING TESTS:   Pt was admitted for right hallux pressure injury and right lower extremity cellulitis. Podiatry was consulted. Infectious disease was consulted. Blood cultures were drawn and resulted positive for MSSA. Right Hallux Bone biopsy and Wound Debridement at bedside with Dr. López on 9/15/2021 demonstrated osteomyelitis. The patient scheduled for Right 1st Partial Ray Resection. The risks and benefits of the procedure were discussed with the patient. The patient expressed understanding of the risks and wished to continue with the procedure. The patient gave consent prior to the procedure. All of the patient's questions and concerns were answered and addressed. The patient received both medical and cardiac optimization and clearance prior to the procedure specified above. Once optimized, the patient underwent 1st partial ray resection. The procedure went as planned without complications. On POD#2, the patient received a right brachial vein PICC by interventional radiology for prolonged antibiotics.  Surgical pathology showed osteomyelitis extending into the margin.   On POD#3, rapid response was called for bleeding from the surgical site which occurred during physical therapy. The surgical incision over the right 1st metatarsal was opened and hematoma was evacuated. The wound was left open to heal by secondary intention, filled with iodoform packing, and dressed. The patient is to be transferred to [facility] for evaluation of cardiac valves for infection via Transesophageal Echocardiogram and possible ICD extraction. Xarelto was discontinued in anticipation for CORBIN and ICD extraction.  Pt. started on heparin drip.     Patient is stable for discharge as per primary medical team and consultants.    PT consulted, recommends discharge [facility]    Patient showed improvement throughout hospitalization. Patient was seen and examined on day of discharge. Patient was medically optimized for discharge with close outpatient follow up.    Vitals:  T:  97.5  P: 78  BP:  112/70  RR:  17  SpO2: 98% RA  GENERAL: patient appears well, no acute distress, appropriately interactive  EYES: sclera clear, no exudates  ENMT: oropharynx clear without erythema, moist mucous membranes  NECK: supple, soft, no thyromegaly noted  LUNGS: good air entry bilaterally, clear to auscultation, symmetric breath sounds, no wheezing or rhonchi appreciated  HEART: soft S1/S2, regular rate and rhythm, +murmur RUSB LUSB, no noted edema to b/l LE  GASTROINTESTINAL: abdomen is soft, nontender, nondistended, normoactive bowel sounds, no palpable masses  INTEGUMENT: good skin turgor, appropriate for ethnicity, no visualized rashes, no jaundice noted  MUSCULOSKELETAL: no clubbing or cyanosis, no obvious deformity, clean ACE wrapped dressing over right foot  NEUROLOGIC: awake, alert, oriented x3, good muscle tone in 4 extremities, no obvious sensory deficits  PSYCHIATRIC: mood is good, affect is congruent with mood, linear and logical thought process  HEME/LYMPH: no palpable supraclavicular nodules, no obvious ecchymosis     ---  PATIENT CONDITION:  - stable      ---  CONSULTANTS:   -  Podiatry: Dr. López  Infectious disease: Dr. Bell   Cardiology: Dr. Mata   Interventional radiology: Dr. George    ---  ADVANCED CARE PLANNING:  - Code status:      - MOLST completed:      [  ] NO     [  ] YES    ---  TIME SPENT:  I, the attending physician, was physically present for the key portions of the evaluation and management (E/M) service provided. The total amount of time spent reviewing the hospital notes, laboratory values, imaging findings, assessing/counseling the patient, discussing with consultant physicians, social work, nursing staff was 45 minutes

## 2021-09-22 NOTE — DISCHARGE NOTE PROVIDER - NSDCMRMEDTOKEN_GEN_ALL_CORE_FT
acetaminophen 325 mg oral tablet: 2 tab(s) orally every 6 hours, As needed, For Temp over 37.9 C (100.2 F)  ALPRAZolam 0.25 mg oral tablet: 1 tab(s) orally 3 times a day, As Needed  Bystolic 5 mg oral tablet: 1 tab(s) orally once a day  cefadroxil 500 mg oral capsule: 1 cap(s) orally every 12 hours  cholecalciferol 400 intl units (10 mcg) oral tablet: 1 tab(s) orally once a day  Coenzyme Q10 200 mg oral capsule: 1 tab(s) orally once a day  dorzolamide-timolol 2.23%-0.68% ophthalmic solution: 1 drop(s) to each affected eye 2 times a day  DULoxetine 60 mg oral delayed release capsule: 1 cap(s) orally once a day  Entresto 49 mg-51 mg oral tablet: 1 tab(s) orally 2 times a day  furosemide 20 mg oral tablet: 1 tab(s) orally once a day  gabapentin 300 mg oral tablet: 1 tab(s) orally 2 times a day  ketorolac 0.5% ophthalmic solution: 1 drop(s) to each affected eye once a day  latanoprost 0.005% ophthalmic solution: 1 drop(s) to each affected eye once a day (in the evening)  lidocaine-prilocaine 2.5%-2.5% topical cream: Apply topically to affected area once on day of treament for 1 dose  Multiple Vitamins oral tablet: 1 tab(s) orally once a day  omeprazole 20 mg oral delayed release tablet: 1 tab(s) orally once a day  pancrelipase 36,000 units-114,000 units-180,000 units oral delayed release capsule: 2 cap(s) orally 3 times a day  prochlorperazine 10 mg oral capsule, extended release:   sotalol 80 mg oral tablet: 0.5 tab(s) orally 2 times a day  tamsulosin 0.4 mg oral capsule: 1 cap(s) orally once a day  Vitamin B6 100 mg oral tablet: 1 tab(s) orally once a day  Xarelto 20 mg oral tablet: 1 tab(s) orally once a day (in the evening)   acetaminophen 325 mg oral tablet: 2 tab(s) orally every 6 hours, As needed, For Temp over 37.9 C (100.2 F)  ALPRAZolam 0.25 mg oral tablet: 1 tab(s) orally 3 times a day, As Needed  ceFAZolin: 2000 milligram(s) intravenous every 8 hours  cholecalciferol 400 intl units (10 mcg) oral tablet: 1 tab(s) orally once a day  Coenzyme Q10 200 mg oral capsule: 1 tab(s) orally once a day  dorzolamide-timolol 2.23%-0.68% ophthalmic solution: 1 drop(s) to each affected eye 2 times a day  DULoxetine 60 mg oral delayed release capsule: 1 cap(s) orally once a day  Entresto 49 mg-51 mg oral tablet: 1 tab(s) orally 2 times a day  furosemide 20 mg oral tablet: 1 tab(s) orally once a day  gabapentin 300 mg oral tablet: 1 tab(s) orally 2 times a day  ketorolac 0.5% ophthalmic solution: 1 drop(s) to each affected eye once a day  latanoprost 0.005% ophthalmic solution: 1 drop(s) to each affected eye once a day (in the evening)  lidocaine-prilocaine 2.5%-2.5% topical cream: Apply topically to affected area once on day of treament for 1 dose  Multiple Vitamins oral tablet: 1 tab(s) orally once a day  omeprazole 20 mg oral delayed release tablet: 1 tab(s) orally once a day  oxycodone-acetaminophen 5 mg-325 mg oral tablet: 1 tab(s) orally every 6 hours, As needed, Moderate Pain (4 - 6)  pancrelipase 36,000 units-114,000 units-180,000 units oral delayed release capsule: 2 cap(s) orally 3 times a day  prochlorperazine 10 mg oral capsule, extended release:   sotalol 80 mg oral tablet: 0.5 tab(s) orally 2 times a day  tamsulosin 0.4 mg oral capsule: 1 cap(s) orally once a day  Vitamin B6 100 mg oral tablet: 1 tab(s) orally once a day  Xarelto 20 mg oral tablet: 1 tab(s) orally once a day (in the evening).  Held for transfer.  Possible ICD extraction.    acetaminophen 325 mg oral tablet: 2 tab(s) orally every 6 hours, As needed, For Temp over 37.9 C (100.2 F)  ALPRAZolam 0.25 mg oral tablet: 1 tab(s) orally 3 times a day, As Needed  ceFAZolin: 2000 milligram(s) intravenous every 8 hours  cholecalciferol 400 intl units (10 mcg) oral tablet: 1 tab(s) orally once a day  Coenzyme Q10 200 mg oral capsule: 1 tab(s) orally once a day  dorzolamide-timolol 2.23%-0.68% ophthalmic solution: 1 drop(s) to each affected eye 2 times a day  DULoxetine 60 mg oral delayed release capsule: 1 cap(s) orally once a day  Entresto 49 mg-51 mg oral tablet: 1 tab(s) orally 2 times a day  furosemide 20 mg oral tablet: 1 tab(s) orally once a day  gabapentin 300 mg oral tablet: 1 tab(s) orally 2 times a day  heparin 100 units/mL-D5% intravenous solution:  intravenous   ketorolac 0.5% ophthalmic solution: 1 drop(s) to each affected eye once a day  latanoprost 0.005% ophthalmic solution: 1 drop(s) to each affected eye once a day (in the evening)  lidocaine-prilocaine 2.5%-2.5% topical cream: Apply topically to affected area once on day of treament for 1 dose  Multiple Vitamins oral tablet: 1 tab(s) orally once a day  omeprazole 20 mg oral delayed release tablet: 1 tab(s) orally once a day  oxycodone-acetaminophen 5 mg-325 mg oral tablet: 1 tab(s) orally every 6 hours, As needed, Moderate Pain (4 - 6)  pancrelipase 36,000 units-114,000 units-180,000 units oral delayed release capsule: 2 cap(s) orally 3 times a day  prochlorperazine 10 mg oral capsule, extended release:   sotalol 80 mg oral tablet: 0.5 tab(s) orally 2 times a day  tamsulosin 0.4 mg oral capsule: 1 cap(s) orally once a day  Vitamin B6 100 mg oral tablet: 1 tab(s) orally once a day

## 2021-09-22 NOTE — PROGRESS NOTE ADULT - PROBLEM SELECTOR PLAN 1
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 López, DOS 9/2021- primarily closed   - patient seen and evaluated  - surgical dressing taken off with care and to patient's tolerance  - surgical site gently cleansed with normal saline and area pat dry with sterile gauze  - surgical site dressed with Aquacel ag  and DSD, secured with ACE bandage   - Follow up with the OR pathologies and cultures  - Follow up with infectious disease recommendations  - Wound Care Instructions below   - Podiatry team will continue to follow patient while in house  WOUND CARE INSTRUCTIONS   1. Keep dressing clean, dry and intact at all times until first follow up appointment   2.  monitor for any signs of infection.  3. Patient is to [facility] with Dr. Felipe or Dr. López within 1 week upon discharge.

## 2021-09-22 NOTE — PROCEDURE NOTE - PROCEDURE FINDINGS AND DETAILS
44cm bard power picc inserted into patent right brachial vein under sterile conditions and ultrasound and fluoroscopic guidance.

## 2021-09-22 NOTE — DISCHARGE NOTE PROVIDER - CARE PROVIDERS DIRECT ADDRESSES
,DirectAddress_Unknown,DirectAddress_Unknown,[email or web address],DirectAddress_Unknown,DirectAddress_Unknown ,DirectAddress_Unknown,DirectAddress_Unknown,[email or web address],DirectAddress_Unknown,DirectAddress_Unknown,[email or web address]

## 2021-09-22 NOTE — DISCHARGE NOTE PROVIDER - PROVIDER TOKENS
PROVIDER:[TOKEN:[[id number]],FOLLOWUP:[1 week]],PROVIDER:[TOKEN:[[id number]],FOLLOWUP:[1 week]],PROVIDER:[TOKEN:[[id number]],FOLLOWUP:[1 week]],FREE:[LAST:[Melany],FIRST:[Bhoomi],PHONE:[phone]:[(   )    -],ADDRESS:[[address] [place] [address]],FOLLOWUP:[1 week]],PROVIDER:[TOKEN:[[id number]],FOLLOWUP:[1 week]] PROVIDER:[TOKEN:[[id number]],FOLLOWUP:[1 week]],PROVIDER:[TOKEN:[[id number]],FOLLOWUP:[1 week]],PROVIDER:[TOKEN:[[id number]],FOLLOWUP:[1 week]],PROVIDER:[TOKEN:[[id number]],FOLLOWUP:[1 week]],FREE:[LAST:[Melany],FIRST:[Bhoomi],PHONE:[phone]],FAX:[(   )    -],ADDRESS:[[address] [place] [address]],FOLLOWUP:[1 week]],PROVIDER:[TOKEN:[[id number]]]

## 2021-09-22 NOTE — PROGRESS NOTE ADULT - SUBJECTIVE AND OBJECTIVE BOX
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 López, DOS 9/2021. The patient states that he is experiencing some soreness along the area and there is some strike through plantarly. . Denies any fever, chills, nausea, vomiting, chest pain, shortness of breath, or calf pain at this time.    Allergies    No Known Allergies    Intolerances        MEDICATIONS  (STANDING):  ceFAZolin   IVPB 2000 milliGRAM(s) IV Intermittent every 8 hours  cholecalciferol 400 Unit(s) Oral daily  dorzolamide 2%/timolol 0.5% Ophthalmic Solution 1 Drop(s) Both EYES two times a day  DULoxetine 60 milliGRAM(s) Oral daily  furosemide    Tablet 20 milliGRAM(s) Oral daily  gabapentin 300 milliGRAM(s) Oral two times a day  heparin  Infusion.  Unit(s)/Hr (18 mL/Hr) IV Continuous <Continuous>  ketorolac 0.5% Ophthalmic Solution 1 Drop(s) Both EYES daily  latanoprost 0.005% Ophthalmic Solution 1 Drop(s) Both EYES at bedtime  melatonin 5 milliGRAM(s) Oral at bedtime  multivitamin 1 Tablet(s) Oral daily  pancrelipase  (CREON 36,000 Lipase Units) 2 Capsule(s) Oral three times a day with meals  pantoprazole    Tablet 40 milliGRAM(s) Oral before breakfast  pyridoxine 100 milliGRAM(s) Oral daily  sacubitril 49 mG/valsartan 51 mG 1 Tablet(s) Oral two times a day  sotalol 40 milliGRAM(s) Oral two times a day  tamsulosin 0.4 milliGRAM(s) Oral at bedtime    MEDICATIONS  (PRN):  acetaminophen   Tablet .. 650 milliGRAM(s) Oral every 6 hours PRN Temp greater or equal to 38C (100.4F), Mild Pain (1 - 3)  ALPRAZolam 0.25 milliGRAM(s) Oral at bedtime PRN anxiety  heparin   Injectable 8000 Unit(s) IV Push every 6 hours PRN For aPTT less than 40  heparin   Injectable 4000 Unit(s) IV Push every 6 hours PRN For aPTT between 40 - 57  oxycodone    5 mG/acetaminophen 325 mG 1 Tablet(s) Oral every 6 hours PRN Moderate Pain (4 - 6)  prochlorperazine   Tablet 10 milliGRAM(s) Oral daily PRN nausea or vomiting      Vital Signs Last 24 Hrs  T(C): 36.6 (22 Sep 2021 12:12), Max: 36.6 (22 Sep 2021 05:36)  T(F): 97.9 (22 Sep 2021 12:12), Max: 97.9 (22 Sep 2021 05:36)  HR: 81 (22 Sep 2021 12:12) (81 - 95)  BP: 110/65 (22 Sep 2021 12:12) (97/57 - 122/68)  BP(mean): --  RR: 16 (22 Sep 2021 12:12) (16 - 17)  SpO2: 94% (22 Sep 2021 12:12) (94% - 95%)    PHYSICAL EXAM:  Vascular: DP and PT palpable, diffuse edema and residual erythema along the right medial forefoot, no digital hair present, skin temperature within normal limits   Neurological: Loss of protective sensation b/l  Musculoskeletal: 5/5 muscle strength in all 4 quadrants b/l, no pain upon palpation along the surgical site, s/p Right 1st partial ray resection   Dermatological:  1. S/p Right 1st partial ray resection- primarily closed- diffuse edema and erythema, sutures intact, skin well approximated, no wound dehiscence  minimal serous sanguineous drainage, no malodor, no streaking, no signs of infection     CBC Full  -  ( 22 Sep 2021 07:31 )  WBC Count : 4.04 K/uL  RBC Count : 2.50 M/uL  Hemoglobin : 8.3 g/dL  Hematocrit : 26.5 %  Platelet Count - Automated : 213 K/uL  Mean Cell Volume : 106.0 fl  Mean Cell Hemoglobin : 33.2 pg  Mean Cell Hemoglobin Concentration : 31.3 gm/dL  Auto Neutrophil # : x  Auto Lymphocyte # : x  Auto Monocyte # : x  Auto Eosinophil # : x  Auto Basophil # : x  Auto Neutrophil % : x  Auto Lymphocyte % : x  Auto Monocyte % : x  Auto Eosinophil % : x  Auto Basophil % : x        ----------CHEM PANEL----------    09-22    138  |  104  |  8   ----------------------------<  127<H>  4.5   |  31  |  0.84    Ca    8.1<L>      22 Sep 2021 07:31          Culture - Tissue with Gram Stain (collected 21 Sep 2021 01:30)  Source: .Tissue Right hallux bone culutre  Gram Stain (21 Sep 2021 06:11):    Rare polymorphonuclear leukocytes seen per low power field    No organisms seen per oil power field    Culture - Tissue with Gram Stain (collected 21 Sep 2021 01:29)  Source: .Tissue right first metatarsal bone cu  Gram Stain (21 Sep 2021 06:11):    Rare polymorphonuclear leukocytes seen per low power field    No organisms seen per oil power field        Imaging: ----------

## 2021-09-22 NOTE — PROGRESS NOTE ADULT - SUBJECTIVE AND OBJECTIVE BOX
pt seen  doing well  no complaints  ICU Vital Signs Last 24 Hrs  T(C): 36.6 (22 Sep 2021 05:36), Max: 36.6 (22 Sep 2021 05:36)  T(F): 97.9 (22 Sep 2021 05:36), Max: 97.9 (22 Sep 2021 05:36)  HR: 92 (22 Sep 2021 05:36) (75 - 95)  BP: 109/64 (22 Sep 2021 05:36) (97/57 - 122/68)  BP(mean): --  ABP: --  ABP(mean): --  RR: 17 (22 Sep 2021 05:36) (17 - 17)  SpO2: 94% (22 Sep 2021 05:36) (94% - 95%)  NAD  soft Nt/ND  LUE-hematoma, resolving

## 2021-09-22 NOTE — PROGRESS NOTE ADULT - ASSESSMENT
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 López, DOS 9/2021- primarily closed     Follow up on the OR pathologies and cultures    Follow up on infectious disease recommendations     Per Dr. López, the general surgeon from [place] who originally had the patient's whipple schedules- states that he will cancel the patient's surgery, the Whipple procedure for his pancreatic cancer  that was originally scheduled for this week, the general surgeon cancelled it and states that he will wait until the OR pathologies come back from the amputation and after the infection resolves to avoid any post operative complications once the whipple procedure is done    Area dressed with Surgicel Nu Knit and DSD    Podiatry team will continue to follow patient while in house

## 2021-09-22 NOTE — PROGRESS NOTE ADULT - SUBJECTIVE AND OBJECTIVE BOX
[facility] -- Milo Thomas, Binta Mata Pannella, Patel, Savella  Office # [phone]      Follow Up:    htn hld cm pre post op optimization   Subjective/Observations:   No events overnight resting comfortably in bed.  No complaints of chest pain, dyspnea, or palpitations reported. No signs of orthopnea or PND.      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    Benign testicular tumor    History of hip replacement        MEDICATIONS  (STANDING):  ceFAZolin   IVPB 2000 milliGRAM(s) IV Intermittent every 8 hours  cholecalciferol 400 Unit(s) Oral daily  dorzolamide 2%/timolol 0.5% Ophthalmic Solution 1 Drop(s) Both EYES two times a day  DULoxetine 60 milliGRAM(s) Oral daily  furosemide    Tablet 20 milliGRAM(s) Oral daily  gabapentin 300 milliGRAM(s) Oral two times a day  ketorolac 0.5% Ophthalmic Solution 1 Drop(s) Both EYES daily  latanoprost 0.005% Ophthalmic Solution 1 Drop(s) Both EYES at bedtime  lidocaine   4% Patch 1 Patch Transdermal daily  melatonin 5 milliGRAM(s) Oral at bedtime  multivitamin 1 Tablet(s) Oral daily  pancrelipase  (CREON 36,000 Lipase Units) 2 Capsule(s) Oral three times a day with meals  pantoprazole    Tablet 40 milliGRAM(s) Oral before breakfast  pyridoxine 100 milliGRAM(s) Oral daily  rivaroxaban 20 milliGRAM(s) Oral with dinner  sacubitril 49 mG/valsartan 51 mG 1 Tablet(s) Oral two times a day  sotalol 40 milliGRAM(s) Oral two times a day  tamsulosin 0.4 milliGRAM(s) Oral at bedtime    MEDICATIONS  (PRN):  acetaminophen   Tablet .. 650 milliGRAM(s) Oral every 6 hours PRN Temp greater or equal to 38C (100.4F), Mild Pain (1 - 3)  ALPRAZolam 0.25 milliGRAM(s) Oral at bedtime PRN anxiety  oxycodone    5 mG/acetaminophen 325 mG 1 Tablet(s) Oral every 6 hours PRN Moderate Pain (4 - 6)  prochlorperazine   Tablet 10 milliGRAM(s) Oral daily PRN nausea or vomiting      Allergies    No Known Allergies    Intolerances        Vital Signs Last 24 Hrs  T(C): 36.6 (22 Sep 2021 05:36), Max: 36.6 (22 Sep 2021 05:36)  T(F): 97.9 (22 Sep 2021 05:36), Max: 97.9 (22 Sep 2021 05:36)  HR: 92 (22 Sep 2021 05:36) (75 - 95)  BP: 109/64 (22 Sep 2021 05:36) (97/57 - 122/68)  BP(mean): --  RR: 17 (22 Sep 2021 05:36) (17 - 17)  SpO2: 94% (22 Sep 2021 05:36) (94% - 95%)    I&O's Summary    21 Sep 2021 07:01  -  22 Sep 2021 07:00  --------------------------------------------------------  IN: 176 mL / OUT: 3050 mL / NET: -2874 mL          PHYSICAL EXAM:  TELE: not on tele   Constitutional: NAD, awake and alert, well-developed  HEENT: Moist Mucous Membranes, Anicteric  Pulmonary: Non-labored, breath sounds are clear bilaterally, No wheezing, crackles or rhonchi  Cardiovascular: Regular, S1 and S2 nl, murmur   Gastrointestinal: Bowel Sounds present, soft, nontender.   Lymph: No lymphadenopathy. No peripheral edema.  Skin: right foot with dressing   Psych:  Mood & affect appropriate    LABS: All Labs Reviewed:                        8.3    4.04  )-----------( 213      ( 22 Sep 2021 07:31 )             26.5                         8.6    4.44  )-----------( 188      ( 21 Sep 2021 07:19 )             27.0                         8.5    3.44  )-----------( 170      ( 20 Sep 2021 07:22 )             26.4     22 Sep 2021 07:31    138    |  104    |  8      ----------------------------<  127    4.5     |  31     |  0.84   21 Sep 2021 18:33    135    |  101    |  8      ----------------------------<  106    4.0     |  28     |  0.69   20 Sep 2021 07:22    139    |  103    |  9      ----------------------------<  100    4.0     |  29     |  0.60     Ca    8.1        22 Sep 2021 07:31  Ca    8.6        21 Sep 2021 18:33  Ca    8.3        20 Sep 2021 07:22    TPro  6.6    /  Alb  2.7    /  TBili  0.5    /  DBili  x      /  AST  19     /  ALT  14     /  AlkPhos  113    20 Sep 2021 07:22    PTT - ( 21 Sep 2021 16:03 )  PTT:117.9 sec

## 2021-09-23 LAB
ALBUMIN SERPL ELPH-MCNC: 2.7 G/DL — LOW (ref 3.3–5)
ALP SERPL-CCNC: 113 U/L — SIGNIFICANT CHANGE UP (ref 40–120)
ALT FLD-CCNC: 12 U/L — SIGNIFICANT CHANGE UP (ref 12–78)
ANION GAP SERPL CALC-SCNC: 4 MMOL/L — LOW (ref 5–17)
AST SERPL-CCNC: 19 U/L — SIGNIFICANT CHANGE UP (ref 15–37)
BASOPHILS # BLD AUTO: 0.03 K/UL — SIGNIFICANT CHANGE UP (ref 0–0.2)
BASOPHILS NFR BLD AUTO: 0.6 % — SIGNIFICANT CHANGE UP (ref 0–2)
BILIRUB SERPL-MCNC: 0.4 MG/DL — SIGNIFICANT CHANGE UP (ref 0.2–1.2)
BUN SERPL-MCNC: 8 MG/DL — SIGNIFICANT CHANGE UP (ref 7–23)
CALCIUM SERPL-MCNC: 8.4 MG/DL — LOW (ref 8.5–10.1)
CHLORIDE SERPL-SCNC: 105 MMOL/L — SIGNIFICANT CHANGE UP (ref 96–108)
CO2 SERPL-SCNC: 30 MMOL/L — SIGNIFICANT CHANGE UP (ref 22–31)
CREAT SERPL-MCNC: 0.62 MG/DL — SIGNIFICANT CHANGE UP (ref 0.5–1.3)
EOSINOPHIL # BLD AUTO: 0.06 K/UL — SIGNIFICANT CHANGE UP (ref 0–0.5)
EOSINOPHIL NFR BLD AUTO: 1.2 % — SIGNIFICANT CHANGE UP (ref 0–6)
GLUCOSE SERPL-MCNC: 94 MG/DL — SIGNIFICANT CHANGE UP (ref 70–99)
HCT VFR BLD CALC: 27 % — LOW (ref 39–50)
HCT VFR BLD CALC: 29.7 % — LOW (ref 39–50)
HGB BLD-MCNC: 8.6 G/DL — LOW (ref 13–17)
HGB BLD-MCNC: 9.2 G/DL — LOW (ref 13–17)
IMM GRANULOCYTES NFR BLD AUTO: 0.4 % — SIGNIFICANT CHANGE UP (ref 0–1.5)
LYMPHOCYTES # BLD AUTO: 0.55 K/UL — LOW (ref 1–3.3)
LYMPHOCYTES # BLD AUTO: 10.7 % — LOW (ref 13–44)
MCHC RBC-ENTMCNC: 31 GM/DL — LOW (ref 32–36)
MCHC RBC-ENTMCNC: 31.9 GM/DL — LOW (ref 32–36)
MCHC RBC-ENTMCNC: 33.1 PG — SIGNIFICANT CHANGE UP (ref 27–34)
MCHC RBC-ENTMCNC: 33.7 PG — SIGNIFICANT CHANGE UP (ref 27–34)
MCV RBC AUTO: 105.9 FL — HIGH (ref 80–100)
MCV RBC AUTO: 106.8 FL — HIGH (ref 80–100)
MONOCYTES # BLD AUTO: 0.81 K/UL — SIGNIFICANT CHANGE UP (ref 0–0.9)
MONOCYTES NFR BLD AUTO: 15.7 % — HIGH (ref 2–14)
NEUTROPHILS # BLD AUTO: 3.68 K/UL — SIGNIFICANT CHANGE UP (ref 1.8–7.4)
NEUTROPHILS NFR BLD AUTO: 71.4 % — SIGNIFICANT CHANGE UP (ref 43–77)
NRBC # BLD: 0 /100 WBCS — SIGNIFICANT CHANGE UP (ref 0–0)
NRBC # BLD: 0 /100 WBCS — SIGNIFICANT CHANGE UP (ref 0–0)
PLATELET # BLD AUTO: 233 K/UL — SIGNIFICANT CHANGE UP (ref 150–400)
PLATELET # BLD AUTO: 267 K/UL — SIGNIFICANT CHANGE UP (ref 150–400)
POTASSIUM SERPL-MCNC: 4.4 MMOL/L — SIGNIFICANT CHANGE UP (ref 3.5–5.3)
POTASSIUM SERPL-SCNC: 4.4 MMOL/L — SIGNIFICANT CHANGE UP (ref 3.5–5.3)
PROT SERPL-MCNC: 7.2 G/DL — SIGNIFICANT CHANGE UP (ref 6–8.3)
RBC # BLD: 2.55 M/UL — LOW (ref 4.2–5.8)
RBC # BLD: 2.78 M/UL — LOW (ref 4.2–5.8)
RBC # FLD: 15.6 % — HIGH (ref 10.3–14.5)
RBC # FLD: 15.6 % — HIGH (ref 10.3–14.5)
SARS-COV-2 RNA SPEC QL NAA+PROBE: SIGNIFICANT CHANGE UP
SODIUM SERPL-SCNC: 139 MMOL/L — SIGNIFICANT CHANGE UP (ref 135–145)
WBC # BLD: 5.15 K/UL — SIGNIFICANT CHANGE UP (ref 3.8–10.5)
WBC # BLD: 7.06 K/UL — SIGNIFICANT CHANGE UP (ref 3.8–10.5)
WBC # FLD AUTO: 5.15 K/UL — SIGNIFICANT CHANGE UP (ref 3.8–10.5)
WBC # FLD AUTO: 7.06 K/UL — SIGNIFICANT CHANGE UP (ref 3.8–10.5)

## 2021-09-23 PROCEDURE — 99233 SBSQ HOSP IP/OBS HIGH 50: CPT

## 2021-09-23 PROCEDURE — 99232 SBSQ HOSP IP/OBS MODERATE 35: CPT | Mod: GC

## 2021-09-23 RX ADMIN — Medication 1 TABLET(S): at 11:04

## 2021-09-23 RX ADMIN — RIVAROXABAN 20 MILLIGRAM(S): KIT at 18:00

## 2021-09-23 RX ADMIN — GABAPENTIN 300 MILLIGRAM(S): 400 CAPSULE ORAL at 05:42

## 2021-09-23 RX ADMIN — Medication 20 MILLIGRAM(S): at 05:43

## 2021-09-23 RX ADMIN — Medication 2 CAPSULE(S): at 13:36

## 2021-09-23 RX ADMIN — Medication 400 UNIT(S): at 11:05

## 2021-09-23 RX ADMIN — LIDOCAINE 1 PATCH: 4 CREAM TOPICAL at 11:05

## 2021-09-23 RX ADMIN — Medication 100 MILLIGRAM(S): at 05:42

## 2021-09-23 RX ADMIN — Medication 40 MILLIGRAM(S): at 22:14

## 2021-09-23 RX ADMIN — GABAPENTIN 300 MILLIGRAM(S): 400 CAPSULE ORAL at 18:00

## 2021-09-23 RX ADMIN — LATANOPROST 1 DROP(S): 0.05 SOLUTION/ DROPS OPHTHALMIC; TOPICAL at 21:55

## 2021-09-23 RX ADMIN — Medication 100 MILLIGRAM(S): at 21:55

## 2021-09-23 RX ADMIN — SACUBITRIL AND VALSARTAN 1 TABLET(S): 24; 26 TABLET, FILM COATED ORAL at 05:43

## 2021-09-23 RX ADMIN — OXYCODONE AND ACETAMINOPHEN 1 TABLET(S): 5; 325 TABLET ORAL at 21:54

## 2021-09-23 RX ADMIN — OXYCODONE AND ACETAMINOPHEN 1 TABLET(S): 5; 325 TABLET ORAL at 11:05

## 2021-09-23 RX ADMIN — Medication 5 MILLIGRAM(S): at 21:54

## 2021-09-23 RX ADMIN — TAMSULOSIN HYDROCHLORIDE 0.4 MILLIGRAM(S): 0.4 CAPSULE ORAL at 21:55

## 2021-09-23 RX ADMIN — PANTOPRAZOLE SODIUM 40 MILLIGRAM(S): 20 TABLET, DELAYED RELEASE ORAL at 05:42

## 2021-09-23 RX ADMIN — OXYCODONE AND ACETAMINOPHEN 1 TABLET(S): 5; 325 TABLET ORAL at 22:54

## 2021-09-23 RX ADMIN — Medication 2 CAPSULE(S): at 17:34

## 2021-09-23 RX ADMIN — LIDOCAINE 1 PATCH: 4 CREAM TOPICAL at 03:20

## 2021-09-23 RX ADMIN — DULOXETINE HYDROCHLORIDE 60 MILLIGRAM(S): 30 CAPSULE, DELAYED RELEASE ORAL at 11:04

## 2021-09-23 RX ADMIN — Medication 1 DROP(S): at 11:04

## 2021-09-23 RX ADMIN — LIDOCAINE 1 PATCH: 4 CREAM TOPICAL at 23:10

## 2021-09-23 RX ADMIN — Medication 40 MILLIGRAM(S): at 05:44

## 2021-09-23 RX ADMIN — CHLORHEXIDINE GLUCONATE 1 APPLICATION(S): 213 SOLUTION TOPICAL at 05:42

## 2021-09-23 RX ADMIN — Medication 0.25 MILLIGRAM(S): at 23:30

## 2021-09-23 RX ADMIN — Medication 100 MILLIGRAM(S): at 11:04

## 2021-09-23 RX ADMIN — Medication 2 CAPSULE(S): at 08:33

## 2021-09-23 RX ADMIN — OXYCODONE AND ACETAMINOPHEN 1 TABLET(S): 5; 325 TABLET ORAL at 12:00

## 2021-09-23 RX ADMIN — LIDOCAINE 1 PATCH: 4 CREAM TOPICAL at 19:10

## 2021-09-23 RX ADMIN — DORZOLAMIDE HYDROCHLORIDE TIMOLOL MALEATE 1 DROP(S): 20; 5 SOLUTION/ DROPS OPHTHALMIC at 18:00

## 2021-09-23 RX ADMIN — Medication 100 MILLIGRAM(S): at 13:36

## 2021-09-23 RX ADMIN — DORZOLAMIDE HYDROCHLORIDE TIMOLOL MALEATE 1 DROP(S): 20; 5 SOLUTION/ DROPS OPHTHALMIC at 05:42

## 2021-09-23 NOTE — PROGRESS NOTE ADULT - SUBJECTIVE AND OBJECTIVE BOX
[facility] -- Milo Thomas, Adolfo, Binta, Arian Rand Savella, Goodger  Office # [phone]    Follow Up:  Cardiac Optimization, Hx of ischemic CMY s/p ICD, severe AS s/p TAVR    Subjective/Observations: Seen awake and alert, comfortable on RA.  Denies foot pain at this time.  Denies any respiratory or cardiac discomfort    REVIEW OF SYSTEMS: All other review of systems is negative unless indicated above  PAST MEDICAL & SURGICAL HISTORY:  HTN (hypertension)    HLD (hyperlipidemia)    GERD (gastroesophageal reflux disease)    Cardiomyopathy    History of total hip replacement  left    History of laminectomy    ICD (implantable cardiac defibrillator) in place    Benign testicular tumor    History of hip replacement    MEDICATIONS  (STANDING):  ceFAZolin   IVPB 2000 milliGRAM(s) IV Intermittent every 8 hours  chlorhexidine 4% Liquid 1 Application(s) Topical <User Schedule>  cholecalciferol 400 Unit(s) Oral daily  dorzolamide 2%/timolol 0.5% Ophthalmic Solution 1 Drop(s) Both EYES two times a day  DULoxetine 60 milliGRAM(s) Oral daily  furosemide    Tablet 20 milliGRAM(s) Oral daily  gabapentin 300 milliGRAM(s) Oral two times a day  ketorolac 0.5% Ophthalmic Solution 1 Drop(s) Both EYES daily  latanoprost 0.005% Ophthalmic Solution 1 Drop(s) Both EYES at bedtime  lidocaine   4% Patch 1 Patch Transdermal daily  melatonin 5 milliGRAM(s) Oral at bedtime  multivitamin 1 Tablet(s) Oral daily  pancrelipase  (CREON 36,000 Lipase Units) 2 Capsule(s) Oral three times a day with meals  pantoprazole    Tablet 40 milliGRAM(s) Oral before breakfast  pyridoxine 100 milliGRAM(s) Oral daily  rivaroxaban 20 milliGRAM(s) Oral with dinner  sacubitril 49 mG/valsartan 51 mG 1 Tablet(s) Oral two times a day  sotalol 40 milliGRAM(s) Oral two times a day  tamsulosin 0.4 milliGRAM(s) Oral at bedtime    MEDICATIONS  (PRN):  acetaminophen   Tablet .. 650 milliGRAM(s) Oral every 6 hours PRN Temp greater or equal to 38C (100.4F), Mild Pain (1 - 3)  ALPRAZolam 0.25 milliGRAM(s) Oral at bedtime PRN anxiety  oxycodone    5 mG/acetaminophen 325 mG 1 Tablet(s) Oral every 6 hours PRN Moderate Pain (4 - 6)  prochlorperazine   Tablet 10 milliGRAM(s) Oral daily PRN nausea or vomiting  sodium chloride 0.9% lock flush 10 milliLiter(s) IV Push every 1 hour PRN Pre/post blood products, medications, blood draw, and to maintain line patency    Allergies    No Known Allergies    Intolerances    Vital Signs Last 24 Hrs  T(C): 36.4 (23 Sep 2021 05:31), Max: 36.6 (22 Sep 2021 12:12)  T(F): 97.5 (23 Sep 2021 05:31), Max: 97.9 (22 Sep 2021 12:12)  HR: 77 (23 Sep 2021 05:31) (77 - 85)  BP: 113/66 (23 Sep 2021 05:31) (110/65 - 137/75)  BP(mean): --  RR: 16 (23 Sep 2021 05:31) (16 - 16)  SpO2: 96% (23 Sep 2021 05:31) (94% - 96%)  I&O's Summary    22 Sep 2021 07:01  -  23 Sep 2021 07:00  --------------------------------------------------------  IN: 150 mL / OUT: 1575 mL / NET: -1425 mL      PHYSICAL EXAM:  TELE: Not on tele  Constitutional: NAD, awake and alert, well-developed  HEENT: Moist Mucous Membranes, Anicteric  Pulmonary: Non-labored, breath sounds are clear bilaterally, No wheezing, rales or rhonchi  Cardiovascular: Regular, S1 and S2, +murmurs, no rubs, gallops or clicks  Gastrointestinal: Bowel Sounds present, soft, nontender.   Lymph: No peripheral edema. No lymphadenopathy.  MSK: +right foot dressing dry and intact  Skin: No visible rashes or ulcers.  Psych:  Mood & affect appropriate  LABS: All Labs Reviewed:                        8.6    5.15  )-----------( 233      ( 23 Sep 2021 08:25 )             27.0                         8.3    4.04  )-----------( 213      ( 22 Sep 2021 07:31 )             26.5                         8.6    4.44  )-----------( 188      ( 21 Sep 2021 07:19 )             27.0     22 Sep 2021 07:31    138    |  104    |  8      ----------------------------<  127    4.5     |  31     |  0.84   21 Sep 2021 18:33    135    |  101    |  8      ----------------------------<  106    4.0     |  28     |  0.69     Ca    8.1        22 Sep 2021 07:31  Ca    8.6        21 Sep 2021 18:33    PTT - ( 21 Sep 2021 16:03 )  PTT:117.9 sec       EXAM:  ECHO TTE WO CON COMP W DOPP         PROCEDURE DATE:  09/18/2021        INTERPRETATION:  INDICATION: Infectious endocarditis  Sonographer KL    Blood Pressure 108/71    Height 185.4 cm     Weight 97.5 kg       BSA 2.22 sq m    Dimensions:  LA 4.3       Normal Values: 2.0 - 4.0 cm  Ao 3.5        Normal Values: 2.0 - 3.8 cm  SEPTUM 1.3       Normal Values: 0.6 - 1.2 cm  PWT 1.2       Normal Values: 0.6 - 1.1 cm  LVIDd 4.9         Normal Values: 3.0 - 5.6 cm  LVIDs 4.3         Normal Values: 1.8 - 4.0 cm    OBSERVATIONS:  Technically difficult and very limited study  Mitral Valve: Mitral annular calcification and calcified leaflets with restricted opening. Mean transmitral valve gradient equals 10.6 mmHg which is consistent with severe mitral stenosis.  Mild MR.  Aortic Valve/Aorta: Patient has a history of bioprosthetic aortic valve replacement. Peak transaortic valve gradient 21 mmHg with a mean transaortic valve gradient of 11 mmHg. This is probably normal in the setting of a prosthetic valve  Tricuspid Valve: Not well-visualized  Pulmonic Valve: Not well-visualized  Left Atrium: Enlarged  Right Atrium: Not well-visualized  Left Ventricle: The left ventricular endocardium is not well-visualized. Unable to accurately assess left ventricular function. If clinically indicated can consider further imaging with echo contrast  Right Ventricle: The right ventricle is not well-visualized. A device wire is noted within the right heart  Pericardium: no significant pericardial effusion.  Pulmonary/RV Pressure: estimated PA systolic pressure of 39 mmHg    IMPRESSION:  Technically difficult and very limited study  The left ventricular endocardium is not well-visualized. Unable to accurately assess left ventricular function. If clinically indicated can consider further imaging with echo contrast  The right ventricle is not well-visualized. A device wire is noted within the right heart  Patient has a history of bioprosthetic aortic valve replacement. Peak transaortic valvegradient 21 mmHg with a mean transaortic valve gradient of 11 mmHg. This is probably normal in the setting of a prosthetic valve  Calcified mitral valve with restricted opening. Valve gradients are consistent with severe mitral stenosis  Mild MR  Left atrial enlargement      --- End of Report ---      TASHA RENDON MD; Attending Cardiologist  This document has been electronically signed. Sep 18 2021  8:43PM      EXAM:  XR FOOT COMP MIN 3 VIEWS RT                          EXAM:  XR ANKLE COMP MIN 3 VIEWS RT                          EXAM:  XR CHEST AP OR PA 1V                          EXAM:  XR TIB FIB AP LAT 2 VIEWS RT                          PROCEDURE DATE:  09/14/2021      INTERPRETATION:  Chest and right tib-fib, ankle, and foot. Patient has cellulitis of the right leg and wound care the foot. There is history of pancreatic cancer.    AP chest on September 14, 2021 at 8:51 PM.    Heart magnified by technique. Extensive is thoracolumbar hardware left-sided [id number] remain.    Mild right thoracic curve again noted.    Lungs remain clear.    Present study shows a right MediPort new since June 12, 2020.    Right tib-fib. AP and lateral views. Arterial calcifications. No knee effusion.    Moderate to advanced knee degeneration. No bone destruction or fracture.    Right ankle. 3 views. Arterial calcification and ankle edema. No bone destruction or fracture.    Right foot. 3 views.    Partial subluxation of the right first MTP joint with associated degeneration again noted.    Scattered slight IP degeneration again seen. No fracture or radiographic bone destruction.    Right foot is similar to August 4.    IMPRESSION: No acute chest finding. Ankle edema and degenerative changes in the foot again seen. Fairly advanced right knee degeneration also again noted.    --- End of Report ---    JODI GURROLA MD; Attending Radiologist  This document has been electronically signed. Sep 15 2021 11:45AM    Ventricular Rate 97 BPM    Atrial Rate 97 BPM    P-R Interval 130 ms    QRS Duration 122 ms    Q-T Interval 374 ms    QTC Calculation(Bazett) 474 ms    P Axis 43 degrees    R Axis 35 degrees    T Axis 173 degrees    Diagnosis Line Normal sinus rhythm  Left bundle branch block  Confirmed by JAI RAND ([id number]) on 9/15/2021 10:39:54 AM

## 2021-09-23 NOTE — PROGRESS NOTE ADULT - PROBLEM SELECTOR PLAN 1
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 López, DOS 9/2021- primarily closed   - patient seen and evaluated  - post op complication of hematoma, remove 4 sutures centrally and removed the hematoma per Dr. Felipe, area copiously irrigated with normal saline  - Area dressed with iodoform packing and surgicel NuKnit and DSD secured with ACE bandage   - patient states that after removal, area had relief of pressure and pain   - Follow up with the OR pathologies and cultures  - Follow up with infectious disease recommendations  - Wound Care Instructions below   - Podiatry team will continue to follow patient while in house  WOUND CARE INSTRUCTIONS   1. Keep dressing clean, dry and intact at all times until first follow up appointment   2.  monitor for any signs of infection.  3. Patient is to follow up [facility] with Dr. Felipe or Dr. López within 1 week upon discharge.

## 2021-09-23 NOTE — PROGRESS NOTE ADULT - SUBJECTIVE AND OBJECTIVE BOX
CHIEF COMPLAINT/INTERVAL HISTORY:  Pt. seen and evaluated for Osteomyelitis of right hallux and MSSA bacteremia.  Pt. is in no distress.  Denied having significant pain this AM.  Tolerating course of IV antibiotic.  Rapid response noted around noon time for bleeding from surgical site.    REVIEW OF SYSTEMS:  No fever, CP, SOB, or abdominal pain.     Vital Signs Last 24 Hrs  T(C): 36.4 (23 Sep 2021 12:45), Max: 36.5 (22 Sep 2021 21:59)  T(F): 97.5 (23 Sep 2021 12:45), Max: 97.7 (22 Sep 2021 21:59)  HR: 79 (23 Sep 2021 12:45) (77 - 85)  BP: 98/64 (23 Sep 2021 12:45) (98/64 - 137/75)  BP(mean): --  RR: 17 (23 Sep 2021 12:45) (16 - 17)  SpO2: 96% (23 Sep 2021 12:45) (95% - 96%)    PHYSICAL EXAM:  GENERAL: NAD  HEENT: EOMI, hearing normal, conjunctiva and sclera clear  Chest: CTA bilaterally, no wheezing  CV: S1S2, RRR,   GI: soft, +BS, NT/ND  Musculoskeletal: dressing over right foot C/D/I  Psychiatric: affect nL, mood nL  Skin: warm and dry    LABS:                        9.2    7.06  )-----------( 267      ( 23 Sep 2021 12:27 )             29.7     09-23    139  |  105  |  8   ----------------------------<  94  4.4   |  30  |  0.62    Ca    8.4<L>      23 Sep 2021 08:25    TPro  7.2  /  Alb  2.7<L>  /  TBili  0.4  /  DBili  x   /  AST  19  /  ALT  12  /  AlkPhos  113  09-23    PTT - ( 21 Sep 2021 16:03 )  PTT:117.9 sec      Assessment and Plan:  80 year old male with PMHx pancreatic cancer (dx 3/2021, currently undergoing chemo), HTN, HLD, CHF (unknown EF), CAD s/p stents x2 (~15 years ago), defibrillator (>20 years ago, likely placed after episode of sustained VT without arrest per pt/family description), TAVR (4/2021), bilateral PE (7/2021) on Xarelto, spinal stenosis, GERD, BPH, macular degeneration presents to the ED c/o R foot pain admitted for R hallux pressure injury and RLE cellulitis found to have MSSA bacteremia and acute OM of right hallux.    #Cellulitis of right lower leg and Acute OM of right hallux  - MSSA bacteremia on admission BCx;   - repeat BCx from 9/16 are NGF  - Surgical pathology of right hallux: +acute Osteomyelitis, MSSA   - Podiatry/wound care following (Dr. Felipe/Rene) recs appreciated  - ID recs appreciated - c/w cefazolin 2gm q8h  - Sp R. hallux amputation 9/20    - Pain control  - Elevated leg to alleviate pain&swelling  - Possible CORBIN and ICD extraction per cardiology    #HTN (hypertension).   - C/w Lasix 20 mg PO qd, Entresto 1 tab BID, Sotalol 40 mg PO BID with hold parameters  - BP low normal, continue to monitor off Bystolic ( at home on 5mg PO qd )  - Monitor VS    #Pulmonary embolism.   - Bilateral PE in 7/2020  - c/w home Xarelto 20mg     #CHF, chronic  - On Lasix 20 mg PO qd, Entresto 1 tab BID, Sotalol 40 mg PO BID with hold parameters  - Tolerating Room air  - no signs of acute volume overload  - Strict I/Os, daily weights  - Monitor and replace electrolytes.  - TTE very limited study, unable to assess LV or RV function, severe MS, bioAVR     #Hx of arrhythmia  - discussed with pt and his children the reason for his ICD placement  - they stated that >20 years ago, the pt had a very fast HR in the setting of a viral infection and the ICD was placed  - pt/family said he did not have a cardiac arrest; thus suspect pt had sustained VT with pulse and had ICD placement  - c/w sotalol     #Anemia (likely 2/2 chronic disease in the setting of pancreatic cancer)  - H/H 9.8/30.0 on admission, baseline hemoglobin unknown  - Currently hemodynamically stable, monitor for signs and symptoms of active bleeding  - Consider transfusion for hemoglobin <8  - resolved thrombocytopenia     #CAD (coronary artery disease).   - Chronic, s/p cardiac stents x2 (15 years ago)  - c/w home Xarelto 20mg     #Pancreatic cancer.   - diagnosed in March 2021. On chemotherapy (does not recall name of medication)  - Continue home medication Pancrelipase 36,000 2 tabs PO TID with meals  - Per podiatry, Dr. López spoke with Dr. Murphy at [facility] to be postponed until after resolution of infection.      #Dysphagia  - patient with complaints of coughing/choking on food on 9/18  - S/S consulted, recs dysphagia 3 soft-thin liquids    #DVT ppx  - Xarelto 20mg PO daily

## 2021-09-23 NOTE — PROGRESS NOTE ADULT - ASSESSMENT
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    Cellulitis of right lower leg and Acute OM of right hallux  - s/p right 1st partial ray resection.  Tolerated procedure well with no obvious cardiac complications  - Pain control  - MSSA bacteremia on admission BCx; repeat BCx from 9/16 are NGTD  - Surgical pathology of right hallux: +acute Osteomyelitis, staph aureus   - Podiatry/wound care follow up  - Abx per ID.  Follow recs    Pulmonary embolism.   - Bilateral PE in 7/2020, on Xarelto     Chronic systolic HF S/P ICD, Severe MS  - Echo not well visualized LV, severe Mitral stenosis, left atrial enlargement   - ICD interrogation completed, no shocks recorded, ICD functioning battery life 2.5 years   - Continue Lasix and Entresto   - No signs of acute volume overload. Tolerating Room air  - Strict I/Os, daily weights  - Monitor and replace electrolytes.  - Unclear why on sotalol, continue for now.  Would need outpatient records.  Follows with Dr. Manoj Perez in [facility]    - Monitor and replete lytes, keep K>4, Mg>2.    - All other medical needs as per primary team.  - Other cardiovascular workup will depend on clinical course.  - Will continue to follow.    Carlene Figueroa DNP, NP-C  Cardiology   Spectra #[phone]        80 year old male with PMH pancreatic cancer (dx 3/2021, currently undergoing chemo), HTN, HLD, CHF (unknown EF), CAD s/p stents x2 (~15 years ago), defibrillator, TAVR (4/2021), bilateral PE (7/2021) on Xarelto, spinal stenosis, GERD, BPH, macular degeneration presents to the ED c/o R foot pain admitted for R hallux pressure injury and RLE cellulitis found to have MSSA bacteremia and acute OM of right hallux.    Cellulitis of right lower leg and Acute OM of right hallux  - s/p right 1st partial ray resection.  Tolerated procedure well with no obvious cardiac complications  - Pain control  - MSSA bacteremia on admission BCx; repeat BCx from 9/16 are NGTD  - ID recommending CORBIN in the setting of TAVR and ICD.  Will discuss with Dr. Quiles in [facility]  - Surgical pathology of right hallux: +acute Osteomyelitis, staph aureus   - Podiatry/wound care follow up  - Abx per ID.  Follow recs    Pulmonary embolism.   - Bilateral PE in 7/2020, on Xarelto     Chronic systolic HF S/P ICD, Severe MS  - Echo not well visualized LV, severe Mitral stenosis, left atrial enlargement   - ICD interrogation completed, no shocks recorded, ICD functioning battery life 2.5 years   - Continue Lasix and Entresto   - No signs of acute volume overload. Tolerating Room air  - Strict I/Os, daily weights  - Monitor and replace electrolytes.  - Unclear why on sotalol, continue for now.  Would need outpatient records.  Follows with Dr. Manoj Perez in [facility]    - Monitor and replete lytes, keep K>4, Mg>2.    - All other medical needs as per primary team.  - Other cardiovascular workup will depend on clinical course.  - Will continue to follow.    Carlene Figueroa DNP, NP-C  Cardiology   Spectra #[phone]        80 year old male with PMH pancreatic cancer (dx 3/2021, currently undergoing chemo), HTN, HLD, CHF (unknown EF), CAD s/p stents x2 (~15 years ago), defibrillator, TAVR (4/2021), bilateral PE (7/2021) on Xarelto, spinal stenosis, GERD, BPH, macular degeneration presents to the ED c/o R foot pain admitted for R hallux pressure injury and RLE cellulitis found to have MSSA bacteremia and acute OM of right hallux.    Cellulitis of right lower leg and Acute OM of right hallux  - s/p right 1st partial ray resection.  Tolerated procedure well with no obvious cardiac complications  - Pain control  - MSSA bacteremia on admission BCx; repeat BCx from 9/16 are NGTD  - ID recommending CORBIN in the setting of TAVR and ICD.  Will discuss with Dr. Quiles in [facility] re: ICD possible ICD extraction.  CORBIN pending patient's decision  - Surgical pathology of right hallux: +acute Osteomyelitis, staph aureus   - Podiatry/wound care follow up  - Abx per ID.  Follow recs    Pulmonary embolism.   - Bilateral PE in 7/2020, on Xarelto     Chronic systolic HF S/P ICD, Severe MS  - Echo not well visualized LV, severe Mitral stenosis, left atrial enlargement   - ICD interrogation completed, no shocks recorded, ICD functioning battery life 2.5 years; Vpaced <1%  - Continue Lasix and Entresto   - No signs of acute volume overload. Tolerating Room air  - Strict I/Os, daily weights  - Monitor and replace electrolytes.  - Unclear why on sotalol, continue for now.  Would need outpatient records.  Follows with Dr. Manoj Perez in     - Monitor and replete lytes, keep K>4, Mg>2.    - All other medical needs as per primary team.  - Other cardiovascular workup will depend on clinical course.  - Will continue to follow.    Carlene Figueroa DNP, NP-C  Cardiology   Spectra #[phone]

## 2021-09-23 NOTE — CHART NOTE - NSCHARTNOTEFT_GEN_A_CORE
PLV [id number] D1  JUANIS DE SANTIAGO, 80y, Male  [id number]    Notified by RN that the above patient had a critical value of positive tissue cultures in the right hallux bone growing few pseudomonas aeruginosa and right first metatarsal bone growing rare pseudomonas aeruginosa.     History of Present Illness:  81 yo man with PMH of pancreatic cancer  (3/2021) on chemo, HTN, CHF, CAD s/p stents, defibrillator, TAVR (4/2021), and bilateral PE (7/2021) was admitted with R foot pain. Patient states he was advised to go the wound care center by his oncologist at [place] due to increased redness and edema to right lower leg.   Now found to have a positive tissue culture with rare pseudomonas     Interventions Taken: Positive Tissue Cultures.   1) Continue cefazolin 2gm q8h. Will discuss with day medicine team to determine further antibiotic regimen.   2) Will endorse above to day medicine team to discuss with / consult with Infectious Disease team.

## 2021-09-23 NOTE — PROGRESS NOTE ADULT - SUBJECTIVE AND OBJECTIVE BOX
80y year old Male seen at [facility] 3WES 355 D1 for s/p Right 1st partial ray resection secondary to acute OM with Dr. López, DOS 9/2021. The patient had a rapid response incident earlier this morning after having physical therapy. The patient states that although he was told by us to perform partial weight bearing to the right heel in the surgical shoe, that the therapist told him he can place full weight and he was walking heel to toe. After his physical therapy session, blood started "gushing" all over. . Denies any fever, chills, nausea, vomiting, chest pain, shortness of breath, or calf pain at this time.    Allergies    No Known Allergies    Intolerances        MEDICATIONS  (STANDING):  ceFAZolin   IVPB 2000 milliGRAM(s) IV Intermittent every 8 hours  cholecalciferol 400 Unit(s) Oral daily  dorzolamide 2%/timolol 0.5% Ophthalmic Solution 1 Drop(s) Both EYES two times a day  DULoxetine 60 milliGRAM(s) Oral daily  furosemide    Tablet 20 milliGRAM(s) Oral daily  gabapentin 300 milliGRAM(s) Oral two times a day  heparin  Infusion.  Unit(s)/Hr (18 mL/Hr) IV Continuous <Continuous>  ketorolac 0.5% Ophthalmic Solution 1 Drop(s) Both EYES daily  latanoprost 0.005% Ophthalmic Solution 1 Drop(s) Both EYES at bedtime  melatonin 5 milliGRAM(s) Oral at bedtime  multivitamin 1 Tablet(s) Oral daily  pancrelipase  (CREON 36,000 Lipase Units) 2 Capsule(s) Oral three times a day with meals  pantoprazole    Tablet 40 milliGRAM(s) Oral before breakfast  pyridoxine 100 milliGRAM(s) Oral daily  sacubitril 49 mG/valsartan 51 mG 1 Tablet(s) Oral two times a day  sotalol 40 milliGRAM(s) Oral two times a day  tamsulosin 0.4 milliGRAM(s) Oral at bedtime    MEDICATIONS  (PRN):  acetaminophen   Tablet .. 650 milliGRAM(s) Oral every 6 hours PRN Temp greater or equal to 38C (100.4F), Mild Pain (1 - 3)  ALPRAZolam 0.25 milliGRAM(s) Oral at bedtime PRN anxiety  heparin   Injectable 8000 Unit(s) IV Push every 6 hours PRN For aPTT less than 40  heparin   Injectable 4000 Unit(s) IV Push every 6 hours PRN For aPTT between 40 - 57  oxycodone    5 mG/acetaminophen 325 mG 1 Tablet(s) Oral every 6 hours PRN Moderate Pain (4 - 6)  prochlorperazine   Tablet 10 milliGRAM(s) Oral daily PRN nausea or vomiting      Vital Signs Last 24 Hrs  T(C): 36.4 (23 Sep 2021 12:45), Max: 36.5 (22 Sep 2021 21:59)  T(F): 97.5 (23 Sep 2021 12:45), Max: 97.7 (22 Sep 2021 21:59)  HR: 79 (23 Sep 2021 12:45) (77 - 85)  BP: 98/64 (23 Sep 2021 12:45) (98/64 - 137/75)  BP(mean): --  RR: 17 (23 Sep 2021 12:45) (16 - 17)  SpO2: 96% (23 Sep 2021 12:45) (95% - 96%)    PHYSICAL EXAM:  Vascular: DP and PT palpable, diffuse edema and residual erythema along the right medial forefoot, no digital hair present, skin temperature within normal limits   Neurological: Loss of protective sensation b/l  Musculoskeletal: 5/5 muscle strength in all 4 quadrants b/l, no pain upon palpation along the surgical site, s/p Right 1st partial ray resection   Dermatological:  1. S/p Right 1st partial ray resection- primarily closed proximally and distally, wound dehscience centrally secondary to hematoma(which was removed ) size 2.5cmx0.3cmx0.3cm- serous sanguineous drainage, more sanguineous than serous,  diffuse edema and erythema, sutures intact, skin well approximated no malodor, no streaking, no signs of infection     CBC Full  -  ( 22 Sep 2021 07:31 )  WBC Count : 4.04 K/uL  RBC Count : 2.50 M/uL  Hemoglobin : 8.3 g/dL  Hematocrit : 26.5 %  Platelet Count - Automated : 213 K/uL  Mean Cell Volume : 106.0 fl  Mean Cell Hemoglobin : 33.2 pg  Mean Cell Hemoglobin Concentration : 31.3 gm/dL  Auto Neutrophil # : x  Auto Lymphocyte # : x  Auto Monocyte # : x  Auto Eosinophil # : x  Auto Basophil # : x  Auto Neutrophil % : x  Auto Lymphocyte % : x  Auto Monocyte % : x  Auto Eosinophil % : x  Auto Basophil % : x        ----------CHEM PANEL----------    09-22    138  |  104  |  8   ----------------------------<  127<H>  4.5   |  31  |  0.84    Ca    8.1<L>      22 Sep 2021 07:31          Culture - Tissue with Gram Stain (collected 21 Sep 2021 01:30)  Source: .Tissue Right hallux bone culutre  Gram Stain (21 Sep 2021 06:11):    Rare polymorphonuclear leukocytes seen per low power field    No organisms seen per oil power field    Culture - Tissue with Gram Stain (collected 21 Sep 2021 01:29)  Source: .Tissue right first metatarsal bone cu  Gram Stain (21 Sep 2021 06:11):    Rare polymorphonuclear leukocytes seen per low power field    No organisms seen per oil power field        Imaging: ----------

## 2021-09-23 NOTE — PHYSICAL THERAPY INITIAL EVALUATION ADULT - ADDITIONAL COMMENTS
Pt lives alone in a 1st floor apt, no steps. Pt ambulates independently with SC and uses RW for longer distances and was independent with ADLs. + driving.

## 2021-09-23 NOTE — PROGRESS NOTE ADULT - SUBJECTIVE AND OBJECTIVE BOX
[facility]  INFECTIOUS DISEASES   [address] [place] [address]  Tel: [phone]     Fax: [phone]  =======================================================    [id number]  JUANIS DE SANTIAGO     Follow up: right hallux cellulitis and bacteremia     This morning walked with PT and the foot started bleeding.   Seen by Dr. Felipe and wound was packed.   No fever.   Had bone biopsy on 9/15, showing acute osteomyelitis.     PAST MEDICAL & SURGICAL HISTORY:  HTN (hypertension)  HLD (hyperlipidemia)  GERD (gastroesophageal reflux disease)  Cardiomyopathy  History of total hip replacement left  History of laminectomy  ICD (implantable cardiac defibrillator) in place  Benign testicular tumor  History of hip replacement    Social Hx: no smoking, ETOH or drugs     FAMILY HISTORY:  Family history of stroke (Mother)    Allergies  No Known Allergies    Antibiotics:  cefazolin      REVIEW OF SYSTEMS:  CONSTITUTIONAL:  No Fever or chills  HEENT:  No diplopia or blurred vision.  No sore throat or runny nose.  CARDIOVASCULAR:  No chest pain or SOB.  RESPIRATORY:  No cough, shortness of breath, PND or orthopnea.  GASTROINTESTINAL:  No nausea, vomiting or diarrhea.  GENITOURINARY:  No dysuria, frequency or urgency. No Blood in urine  MUSCULOSKELETAL: foot ulcer   SKIN:  No change in skin, hair or nails.  NEUROLOGIC:  No paresthesias, fasciculations, seizures or weakness.  PSYCHIATRIC:  No disorder of thought or mood.  ENDOCRINE:  No heat or cold intolerance, polyuria or polydipsia.  HEMATOLOGICAL:  No easy bruising or bleeding.     Physical Exam:  Vital Signs Last 24 Hrs  T(C): 36.4 (23 Sep 2021 12:45), Max: 36.5 (22 Sep 2021 21:59)  T(F): 97.5 (23 Sep 2021 12:45), Max: 97.7 (22 Sep 2021 21:59)  HR: 79 (23 Sep 2021 12:45) (77 - 85)  BP: 98/64 (23 Sep 2021 12:45) (98/64 - 137/75)  BP(mean): --  RR: 17 (23 Sep 2021 12:45) (16 - 17)  SpO2: 96% (23 Sep 2021 12:45) (95% - 96%)  GEN: NAD  HEENT: normocephalic and atraumatic. EOMI. PERRL.    NECK: Supple.  No lymphadenopathy   LUNGS: Clear to auscultation.  HEART: Regular rate and rhythm without murmur.  ABDOMEN: Soft, nontender, and nondistended.  Positive bowel sounds.    : No CVA tenderness  EXTREMITIES: R foot dressed after amputation of hallux   NEUROLOGIC: grossly intact.  PSYCHIATRIC: Appropriate affect .  SKIN: No ulceration or induration present.    Labs:                        9.2    7.06  )-----------( 267      ( 23 Sep 2021 12:27 )             29.7     09-23    139  |  105  |  8   ----------------------------<  94  4.4   |  30  |  0.62    Ca    8.4<L>      23 Sep 2021 08:25    TPro  7.2  /  Alb  2.7<L>  /  TBili  0.4  /  DBili  x   /  AST  19  /  ALT  12  /  AlkPhos  113  09-23    Culture - Tissue with Gram Stain (collected 09-21-21 @ 01:30)  Source: .Tissue Right hallux bone culutre  Gram Stain (09-21-21 @ 06:11):    Rare polymorphonuclear leukocytes seen per low power field    No organisms seen per oil power field    Culture - Tissue with Gram Stain (collected 09-21-21 @ 01:29)  Source: .Tissue right first metatarsal bone cu  Gram Stain (09-21-21 @ 06:11):    Rare polymorphonuclear leukocytes seen per low power field    No organisms seen per oil power field    Culture - Blood (collected 09-16-21 @ 12:01)  Source: .Blood Blood-Peripheral  Final Report (09-21-21 @ 13:00):    No Growth Final    Culture - Blood (collected 09-16-21 @ 12:01)  Source: .Blood Blood-Peripheral  Final Report (09-21-21 @ 13:00):    No Growth Final    Culture - Tissue with Gram Stain (collected 09-16-21 @ 01:16)  Source: .Tissue right hallux bone culture  Gram Stain (09-16-21 @ 04:48):    Rare polymorphonuclear leukocytes seen per low power field    No organisms seen per oil power field  Final Report (09-21-21 @ 10:11):    Few Staphylococcus aureus  Organism: Staphylococcus aureus (09-21-21 @ 10:11)  Organism: Staphylococcus aureus (09-21-21 @ 10:11)    Sensitivities:      -  Ampicillin/Sulbactam: S <=8/4      -  Cefazolin: S <=4      -  Clindamycin: S <=0.25      -  Erythromycin: S <=0.25      -  Gentamicin: S 2 Should not be used as monotherapy      -  Oxacillin: S <=0.25      -  RIF- Rifampin: S <=1 Should not be used as monotherapy      -  Tetra/Doxy: S <=1      -  Trimethoprim/Sulfamethoxazole: S <=0.5/9.5      -  Vancomycin: S 2      Method Type: TYSON    Culture - Urine (collected 09-15-21 @ 04:15)  Source: Clean Catch Clean Catch (Midstream)  Final Report (09-16-21 @ 00:06):    <10,000 CFU/mL Normal Urogenital Juani    Culture - Blood (collected 09-15-21 @ 01:20)  Source: .Blood Blood-Peripheral  Gram Stain (09-16-21 @ 01:09):    Growth in aerobic bottle: Gram Positive Cocci in Clusters    Growth in anaerobic bottle: Gram Positive Cocci in Clusters  Final Report (09-17-21 @ 16:59):    Growth in aerobic and anaerobic bottles: Staphylococcus aureus    ***Blood Panel PCR results on this specimen are available    approximately 3 hours after the Gram stain result.***    Gram stain, PCR, and/or culture results may not always    correspond dueto difference in methodologies.    ************************************************************    This PCR assay was performed by multiplex PCR. This    Assay tests for 66 bacterial and resistance gene targets.    Please refer to the [facility] test directory    at [email or web address] for details.  Organism: Blood Culture PCR  Staphylococcus aureus (09-17-21 @ 16:59)  Organism: Staphylococcus aureus (09-17-21 @ 16:59)    Sensitivities:      -  Ampicillin/Sulbactam: S <=8/4      -  Cefazolin: S <=4      -  Clindamycin: R <=0.25 This isolate is presumed to be clindamycin resistant based on detection of inducible resistance. Clindamycin may still be effective in some patients.      -  Erythromycin: I 1      -  Gentamicin: S <=1 Should not be used as monotherapy      -  Oxacillin: S 0.5      -  RIF- Rifampin: S <=1 Should not be used as monotherapy      -  Tetra/Doxy: S <=1      -  Trimethoprim/Sulfamethoxazole: S <=0.5/9.5      -  Vancomycin: S 2      Method Type: TYSON  Organism: Blood Culture PCR (09-17-21 @ 16:59)    Sensitivities:      -  Staphylococcus aureus: Detec Any isolate of Staphylococcus aureus from a blood culture is NOT considered a contaminant.      Method Type: PCR    Culture - Blood (collected 09-15-21 @ 01:20)  Source: .Blood Blood-Peripheral  Gram Stain (09-15-21 @ 22:34):    Growth in aerobic bottle: Gram Positive Cocci in Clusters  Final Report (09-17-21 @ 17:41):    Growth in aerobic bottle: Staphylococcus aureus    See previous culture [id number]    WBC Count: 7.06 K/uL (09-23-21 @ 12:27)  WBC Count: 5.15 K/uL (09-23-21 @ 08:25)  WBC Count: 4.04 K/uL (09-22-21 @ 07:31)  WBC Count: 4.44 K/uL (09-21-21 @ 07:19)  WBC Count: 3.44 K/uL (09-20-21 @ 07:22)  WBC Count: 4.10 K/uL (09-19-21 @ 07:37)  WBC Count: 4.66 K/uL (09-19-21 @ 00:19)    Creatinine, Serum: 0.62 mg/dL (09-23-21 @ 08:25)  Creatinine, Serum: 0.84 mg/dL (09-22-21 @ 07:31)  Creatinine, Serum: 0.69 mg/dL (09-21-21 @ 18:33)  Creatinine, Serum: 0.60 mg/dL (09-20-21 @ 07:22)  Creatinine, Serum: 0.78 mg/dL (09-19-21 @ 07:37)    C-Reactive Protein, Serum: 168 mg/L (09-15-21 @ 07:50)    Ferritin, Serum: 585 ng/mL (09-15-21 @ 11:30)    COVID-19 PCR: NotDetec (09-23-21 @ 08:33)  COVID-19 PCR: NotDetec (09-20-21 @ 12:59)  COVID-19 PCR: NotDetec (09-14-21 @ 20:17)    All imaging and other data have been reviewed.  < from: Xray Tibia + Fibula 2 Views, Right (09.14.21 @ 20:54) >  IMPRESSION: No acute chest finding. Ankle edema and degenerative changes in the foot again seen. Fairly advanced right knee degeneration also again noted.    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 Patient states he was advised to go the wound care center by his oncologist at [place] due to increased redness and edema to right lower leg.     ESR 53  Xray with degenerative changes   s/p debridement and bone biopsy on 9/15 with acute OM    R lower leg cellulitis, R hallux wound    Recommendations:   - Blood culture with MSSA on 9/15  - Blood culture NGTD on 9/16  - Wound culture MSSA  - S/P R 1st toe amputation on 9/20, culture so far negative will follow.   - Continue cefazolin 2gm q8h  - TTE negative, CORBIN due to history of TAVR is indicated.   - Even with clean amputation margines will treat for 6 weeks unless CORBIN done and is negative.  - No need for rifampin or any other ABx since there is other source for infection and TTE is negative for vegetation.   - PICC in place.      Will follow.    Jaren Bell MD  Division of Infectious Diseases   Cell [phone] between 8am and 6pm   After 6pm and weekends please call ID service at [phone].

## 2021-09-23 NOTE — CHART NOTE - NSCHARTNOTEFT_GEN_A_CORE
Rapid response was called at 11:20 for right foot bleed.    Events leading up to Rapid Response:  81 yo Male s/p right hallux amputation was walking when he suddenly began to "gush blood" through the dressings of his right foot. Rapid response team was called by nurse when she had difficulty stopping the bleed. Patient was moved to bed. Patient was seen and examined at the bedside by the rapid response team. Dr. Felipe at bedside. Patient states he feels a little dizzy, denies any SOB, vision changes, headache/lightheadedness, CP, abdominal pain.    Rapid Response Vital Signs:    BP: 134/77  HR: 76 BPM  RR: 8  SpO2: 100% on RA  Temp: 36.4C      Physical Exam:  Gen: well appearing, NAD  HEENT: NCAT  Cardio: regular rate and rhythm, +s1s2, no murmurs, rubs, or gallops  Pulm: CTA b/l, no wheezes, rales or rhonchi  Abdomen: soft, nontender, nondistended, +BS x4 quadrants, no guarding  Extremities: Right foot dressing soaked with blood, no cyanosis or clubbing of left foot.   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 hallux amputation. Rapid response called for bleeding from rt. foot surgical site likely 2/2 to ruptured hematoma.  -Stat CBC   -Dressing and sutures removed by Dr. Felipe, bedside washout, wound packed and sealed with Tegaderm   -Patient will most likely to heal via secondary intention  -Podiatry will have another look at the wound later in the day   -Discussed with Dr. Felipe, agrees with above plan  -RN to call if any changes Rapid response was called at 11:20 for right foot bleed.    Events leading up to Rapid Response:  81 yo Male s/p right hallux amputation was walking when he suddenly began to "gush blood" through the dressings of his right foot. Rapid response team was called by nurse when she had difficulty stopping the bleed. Patient was moved to bed. Patient was seen and examined at the bedside by the rapid response team. Dr. Felipe at bedside. Patient states he feels a little dizzy, denies any SOB, vision changes, headache/lightheadedness, CP, abdominal pain.    Rapid Response Vital Signs:    BP: 134/77  HR: 76 BPM  RR: 8  SpO2: 100% on RA  Temp: 36.4C      Physical Exam:  Gen: well appearing, NAD  HEENT: NCAT  Cardio: regular rate and rhythm, +s1s2, no murmurs, rubs, or gallops  Pulm: CTA b/l, no wheezes, rales or rhonchi  Abdomen: soft, nontender, nondistended, +BS x4 quadrants, no guarding  Extremities: Right foot dressing soaked with blood, no cyanosis or clubbing of left foot.   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 hallux amputation. Rapid response called for bleeding from rt. foot surgical site likely 2/2 to ruptured hematoma.  -Stat CBC   -Dressing and sutures removed by Dr. Felipe, bedside washout, wound packed and sealed with Tegaderm   -Patient will most likely to heal via secondary intention  -Podiatry will have another look at the wound later in the day   -Discussed with Dr. Felipe, agrees with above plan  -RN to call if any changes      ADDENDUM  - Pt was seen and examined at bedside ~2hr sp RRT response. Patient is awake, alert, and oriented lying comfortably in bed. Patient has no complaints at this time. Denies fever, chills, HA, lightheadedness, N/V, numbness/tingling/weakness.     REVIEW OF SYSTEMS:  CONSTITUTIONAL: No fever or chills  HEENT:  No headache, no sore throat  RESPIRATORY: No cough, wheezing, or shortness of breath  CARDIOVASCULAR: No chest pain, palpitations  GASTROINTESTINAL: No abd pain, nausea, vomiting, or diarrhea  GENITOURINARY: No dysuria, frequency, or hematuria  NEUROLOGICAL: no focal weakness or dizziness  MUSCULOSKELETAL: +R foot pain    VITAL SIGNS:  T(C): 36.4 (09-23-21 @ 12:45), Max: 36.5 (09-22-21 @ 21:59)  HR: 79 (09-23-21 @ 12:45) (77 - 85)  BP: 98/64 (09-23-21 @ 12:45) (98/64 - 137/75)  RR: 17 (09-23-21 @ 12:45) (16 - 17)  SpO2: 96% (09-23-21 @ 12:45) (95% - 96%)      LABS:                        9.2    7.06  )-----------( 267      ( 23 Sep 2021 12:27 )             29.7     09-23    139  |  105  |  8   ----------------------------<  94  4.4   |  30  |  0.62    Ca    8.4<L>      23 Sep 2021 08:25    TPro  7.2  /  Alb  2.7<L>  /  TBili  0.4  /  DBili  x   /  AST  19  /  ALT  12  /  AlkPhos  113  09-23    PTT - ( 21 Sep 2021 16:03 )  PTT:117.9 sec      PE  GENERAL: NAD  HEENT:  anicteric, moist mucous membranes  CHEST/LUNG:  CTA b/l, no rales, wheezes, or rhonchi  HEART:  RRR, S1/S2, +syst murmur  ABDOMEN:  BS+, soft overall but firm in epigastric region, nontender, nondistended  EXTREMITIES: no cyanosis or edema, +Radial pulse BL  NERVOUS SYSTEM: answers questions and follows commands appropriately  MSK: R foot wrapped in ace bandage Dressing C/D/I. Calves soft, nontender.        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 hallux amputation. SP Rapid response called for bleeding from rt. foot surgical site likely 2/2 to ruptured hematoma.    -Plan as specified above  -Discussed with Dr. Felipe, agrees with above plan

## 2021-09-23 NOTE — PROGRESS NOTE ADULT - ASSESSMENT
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 López, DOS 9/2021- primarily closed - hematoma post operative - patient had rapid response event secondary to blood gushing out on 9/23/2021- Dr. Felipe at bedside     Per Dr. Felipe, removed central sutures and cleaned out the hematoma and copious irrigation with normal saline, area pat dry and dressed with iodoform packing and surgicel NuKnit and DSD and secured with ACE bandage      Follow up on the OR pathologies and cultures    Follow up on infectious disease recommendations     Per Dr. López, the general surgeon from [place] who originally had the patient's whipple schedules- states that he will cancel the patient's surgery, the Whipple procedure for his pancreatic cancer  that was originally scheduled for this week, the general surgeon cancelled it and states that he will wait until the OR pathologies come back from the amputation and after the infection resolves to avoid any post operative complications once the whipple procedure is done    Area dressed with Surgicel Nu Knit and DSD    Podiatry team will continue to follow patient while in house

## 2021-09-24 LAB
ANION GAP SERPL CALC-SCNC: 6 MMOL/L — SIGNIFICANT CHANGE UP (ref 5–17)
BASOPHILS # BLD AUTO: 0.04 K/UL — SIGNIFICANT CHANGE UP (ref 0–0.2)
BASOPHILS NFR BLD AUTO: 1 % — SIGNIFICANT CHANGE UP (ref 0–2)
BUN SERPL-MCNC: 10 MG/DL — SIGNIFICANT CHANGE UP (ref 7–23)
CALCIUM SERPL-MCNC: 9 MG/DL — SIGNIFICANT CHANGE UP (ref 8.5–10.1)
CHLORIDE SERPL-SCNC: 103 MMOL/L — SIGNIFICANT CHANGE UP (ref 96–108)
CO2 SERPL-SCNC: 30 MMOL/L — SIGNIFICANT CHANGE UP (ref 22–31)
CREAT SERPL-MCNC: 0.76 MG/DL — SIGNIFICANT CHANGE UP (ref 0.5–1.3)
EOSINOPHIL # BLD AUTO: 0.13 K/UL — SIGNIFICANT CHANGE UP (ref 0–0.5)
EOSINOPHIL NFR BLD AUTO: 3 % — SIGNIFICANT CHANGE UP (ref 0–6)
GLUCOSE SERPL-MCNC: 121 MG/DL — HIGH (ref 70–99)
HCT VFR BLD CALC: 28.2 % — LOW (ref 39–50)
HGB BLD-MCNC: 8.6 G/DL — LOW (ref 13–17)
LYMPHOCYTES # BLD AUTO: 0.76 K/UL — LOW (ref 1–3.3)
LYMPHOCYTES # BLD AUTO: 18 % — SIGNIFICANT CHANGE UP (ref 13–44)
MAGNESIUM SERPL-MCNC: 2.1 MG/DL — SIGNIFICANT CHANGE UP (ref 1.6–2.6)
MCHC RBC-ENTMCNC: 30.5 GM/DL — LOW (ref 32–36)
MCHC RBC-ENTMCNC: 33 PG — SIGNIFICANT CHANGE UP (ref 27–34)
MCV RBC AUTO: 108 FL — HIGH (ref 80–100)
MONOCYTES # BLD AUTO: 0.84 K/UL — SIGNIFICANT CHANGE UP (ref 0–0.9)
MONOCYTES NFR BLD AUTO: 20 % — HIGH (ref 2–14)
NEUTROPHILS # BLD AUTO: 2.45 K/UL — SIGNIFICANT CHANGE UP (ref 1.8–7.4)
NEUTROPHILS NFR BLD AUTO: 56 % — SIGNIFICANT CHANGE UP (ref 43–77)
NRBC # BLD: SIGNIFICANT CHANGE UP /100 WBCS (ref 0–0)
PLATELET # BLD AUTO: 257 K/UL — SIGNIFICANT CHANGE UP (ref 150–400)
POTASSIUM SERPL-MCNC: 4.7 MMOL/L — SIGNIFICANT CHANGE UP (ref 3.5–5.3)
POTASSIUM SERPL-SCNC: 4.7 MMOL/L — SIGNIFICANT CHANGE UP (ref 3.5–5.3)
RBC # BLD: 2.61 M/UL — LOW (ref 4.2–5.8)
RBC # FLD: 15.6 % — HIGH (ref 10.3–14.5)
SODIUM SERPL-SCNC: 139 MMOL/L — SIGNIFICANT CHANGE UP (ref 135–145)
WBC # BLD: 4.22 K/UL — SIGNIFICANT CHANGE UP (ref 3.8–10.5)
WBC # FLD AUTO: 4.22 K/UL — SIGNIFICANT CHANGE UP (ref 3.8–10.5)

## 2021-09-24 PROCEDURE — 99232 SBSQ HOSP IP/OBS MODERATE 35: CPT

## 2021-09-24 PROCEDURE — 99233 SBSQ HOSP IP/OBS HIGH 50: CPT

## 2021-09-24 PROCEDURE — 99232 SBSQ HOSP IP/OBS MODERATE 35: CPT | Mod: GC

## 2021-09-24 RX ORDER — RIVAROXABAN 15 MG-20MG
1 KIT ORAL
Qty: 0 | Refills: 0 | DISCHARGE

## 2021-09-24 RX ORDER — CEFAZOLIN SODIUM 1 G
2000 VIAL (EA) INJECTION
Qty: 0 | Refills: 0 | DISCHARGE
Start: 2021-09-24

## 2021-09-24 RX ORDER — NEBIVOLOL HYDROCHLORIDE 5 MG/1
1 TABLET ORAL
Qty: 0 | Refills: 0 | DISCHARGE

## 2021-09-24 RX ADMIN — Medication 100 MILLIGRAM(S): at 21:35

## 2021-09-24 RX ADMIN — LATANOPROST 1 DROP(S): 0.05 SOLUTION/ DROPS OPHTHALMIC; TOPICAL at 21:35

## 2021-09-24 RX ADMIN — Medication 2 CAPSULE(S): at 13:40

## 2021-09-24 RX ADMIN — OXYCODONE AND ACETAMINOPHEN 1 TABLET(S): 5; 325 TABLET ORAL at 13:00

## 2021-09-24 RX ADMIN — Medication 0.25 MILLIGRAM(S): at 23:23

## 2021-09-24 RX ADMIN — Medication 1 DROP(S): at 11:08

## 2021-09-24 RX ADMIN — Medication 5 MILLIGRAM(S): at 21:34

## 2021-09-24 RX ADMIN — LIDOCAINE 1 PATCH: 4 CREAM TOPICAL at 23:25

## 2021-09-24 RX ADMIN — Medication 40 MILLIGRAM(S): at 17:31

## 2021-09-24 RX ADMIN — Medication 1 TABLET(S): at 11:08

## 2021-09-24 RX ADMIN — DULOXETINE HYDROCHLORIDE 60 MILLIGRAM(S): 30 CAPSULE, DELAYED RELEASE ORAL at 11:08

## 2021-09-24 RX ADMIN — DORZOLAMIDE HYDROCHLORIDE TIMOLOL MALEATE 1 DROP(S): 20; 5 SOLUTION/ DROPS OPHTHALMIC at 06:22

## 2021-09-24 RX ADMIN — Medication 400 UNIT(S): at 11:08

## 2021-09-24 RX ADMIN — OXYCODONE AND ACETAMINOPHEN 1 TABLET(S): 5; 325 TABLET ORAL at 12:02

## 2021-09-24 RX ADMIN — Medication 100 MILLIGRAM(S): at 13:40

## 2021-09-24 RX ADMIN — Medication 2 CAPSULE(S): at 08:31

## 2021-09-24 RX ADMIN — GABAPENTIN 300 MILLIGRAM(S): 400 CAPSULE ORAL at 17:35

## 2021-09-24 RX ADMIN — DORZOLAMIDE HYDROCHLORIDE TIMOLOL MALEATE 1 DROP(S): 20; 5 SOLUTION/ DROPS OPHTHALMIC at 17:30

## 2021-09-24 RX ADMIN — Medication 100 MILLIGRAM(S): at 06:22

## 2021-09-24 RX ADMIN — TAMSULOSIN HYDROCHLORIDE 0.4 MILLIGRAM(S): 0.4 CAPSULE ORAL at 21:35

## 2021-09-24 RX ADMIN — Medication 20 MILLIGRAM(S): at 06:22

## 2021-09-24 RX ADMIN — Medication 2 CAPSULE(S): at 17:30

## 2021-09-24 RX ADMIN — CHLORHEXIDINE GLUCONATE 1 APPLICATION(S): 213 SOLUTION TOPICAL at 06:23

## 2021-09-24 RX ADMIN — LIDOCAINE 1 PATCH: 4 CREAM TOPICAL at 19:13

## 2021-09-24 RX ADMIN — LIDOCAINE 1 PATCH: 4 CREAM TOPICAL at 11:11

## 2021-09-24 RX ADMIN — Medication 100 MILLIGRAM(S): at 11:08

## 2021-09-24 RX ADMIN — OXYCODONE AND ACETAMINOPHEN 1 TABLET(S): 5; 325 TABLET ORAL at 21:34

## 2021-09-24 RX ADMIN — Medication 40 MILLIGRAM(S): at 08:25

## 2021-09-24 RX ADMIN — PANTOPRAZOLE SODIUM 40 MILLIGRAM(S): 20 TABLET, DELAYED RELEASE ORAL at 06:22

## 2021-09-24 RX ADMIN — OXYCODONE AND ACETAMINOPHEN 1 TABLET(S): 5; 325 TABLET ORAL at 22:04

## 2021-09-24 RX ADMIN — GABAPENTIN 300 MILLIGRAM(S): 400 CAPSULE ORAL at 06:22

## 2021-09-24 NOTE — PROGRESS NOTE ADULT - ASSESSMENT
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    Cellulitis of right lower leg and Acute OM of right hallux  - s/p right 1st partial ray resection.  Tolerated procedure well with no obvious cardiac complications  - Pain control  - MSSA bacteremia on admission BCx; repeat BCx from 9/16 are NGTD  - ID recommending CORBIN in the setting of TAVR and ICD.  Will discuss with Dr. Quiles in [facility] re: ICD possible ICD extraction.    - pt agreeable to transfer to [place] for CORBIN, ? possibly today   - Surgical pathology of right hallux: +acute Osteomyelitis, staph aureus   - Podiatry/wound care follow up  - Abx per ID.  Follow recs    Pulmonary embolism.   - Bilateral PE in 7/2020, on Xarelto   - stop Xarelto, start hep gtt pending CORBIN     Chronic systolic HF S/P ICD, Severe MS  - Echo not well visualized LV, severe Mitral stenosis, left atrial enlargement   - ICD interrogation completed, no shocks recorded, ICD functioning battery life 2.5 years; Vpaced <1%  - Continue Lasix and Entresto   - No signs of acute volume overload. Tolerating Room air  - Strict I/Os, daily weights  - Monitor and replete Lytes. Keep K > 4 and Mg > 2  - Unclear why on sotalol, continue for now.  Would need outpatient records.  Follows with Dr. Manoj Perez in [facility]    HTN  - BP: 99/64 (09-24-21 @ 13:09) (97/68 - 110/68)  - labile but acceptable   - continue sotalol with hold parameters  - continue entresto with hold parameters    - All other medical needs as per primary team.  - Other cardiovascular workup will depend on clinical course.  - Will continue to follow.    Laney [facility]  Nurse Pracitioner - Cardiology   Spectra #[id number]/ [phone]

## 2021-09-24 NOTE — PROGRESS NOTE ADULT - ASSESSMENT
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 López, DOS 9/2021- primarily closed - hematoma post operative - patient had rapid response event secondary to blood gushing out on 9/23/2021- Dr. Felipe at bedside     Per Dr. Felipe, removed central sutures and cleaned out the hematoma and copious irrigation with normal saline, area pat dry and dressed with iodoform packing and surgicel NuKnit and DSD and secured with ACE bandage- performed on 9/23/2021    Area repacked with iodoform packing       Follow up on the OR pathologies and cultures    Follow up on infectious disease recommendations     Per Dr. López, the general surgeon from [place] who originally had the patient's whipple schedules- states that he will cancel the patient's surgery, the Whipple procedure for his pancreatic cancer  that was originally scheduled for this week, the general surgeon cancelled it and states that he will wait until the OR pathologies come back from the amputation and after the infection resolves to avoid any post operative complications once the whipple procedure is done    Area dressed with Surgicel Nu Knit and DSD    Podiatry team will continue to follow patient while in house

## 2021-09-24 NOTE — PROGRESS NOTE ADULT - SUBJECTIVE AND OBJECTIVE BOX
[facility] -- Milo Thomas, Adolfo, Binta, Arian Rand Savella, Goodger: Office # [phone]    Follow Up:  Cardiac Optimization, Hx of ischemic CMY s/p ICD, severe AS s/p TAVR    Subjective/Observations:  Patient seen and examined, awake, alert, eating breakfast in bed, no complaints of chest pain, dyspnea, palpitations or dizziness, orthopnea and PND. Tolerating room air.     REVIEW OF SYSTEMS: All review of systems is negative for eye, ENT, GI, , allergic, dermatologic, musculoskeletal and neurologic except as described above    PAST MEDICAL & SURGICAL HISTORY:  HTN (hypertension)  HLD (hyperlipidemia)  GERD (gastroesophageal reflux disease)  Cardiomyopathy  History of total hip replacement  left  History of laminectomy  ICD (implantable cardiac defibrillator) in place  Benign testicular tumor  History of hip replacement        MEDICATIONS  (STANDING):  ceFAZolin   IVPB 2000 milliGRAM(s) IV Intermittent every 8 hours  chlorhexidine 4% Liquid 1 Application(s) Topical <User Schedule>  cholecalciferol 400 Unit(s) Oral daily  dorzolamide 2%/timolol 0.5% Ophthalmic Solution 1 Drop(s) Both EYES two times a day  DULoxetine 60 milliGRAM(s) Oral daily  furosemide    Tablet 20 milliGRAM(s) Oral daily  gabapentin 300 milliGRAM(s) Oral two times a day  ketorolac 0.5% Ophthalmic Solution 1 Drop(s) Both EYES daily  latanoprost 0.005% Ophthalmic Solution 1 Drop(s) Both EYES at bedtime  lidocaine   4% Patch 1 Patch Transdermal daily  melatonin 5 milliGRAM(s) Oral at bedtime  multivitamin 1 Tablet(s) Oral daily  pancrelipase  (CREON 36,000 Lipase Units) 2 Capsule(s) Oral three times a day with meals  pantoprazole    Tablet 40 milliGRAM(s) Oral before breakfast  pyridoxine 100 milliGRAM(s) Oral daily  sacubitril 49 mG/valsartan 51 mG 1 Tablet(s) Oral two times a day  sotalol 40 milliGRAM(s) Oral two times a day  tamsulosin 0.4 milliGRAM(s) Oral at bedtime    MEDICATIONS  (PRN):  acetaminophen   Tablet .. 650 milliGRAM(s) Oral every 6 hours PRN Temp greater or equal to 38C (100.4F), Mild Pain (1 - 3)  ALPRAZolam 0.25 milliGRAM(s) Oral at bedtime PRN anxiety  oxycodone    5 mG/acetaminophen 325 mG 1 Tablet(s) Oral every 6 hours PRN Moderate Pain (4 - 6)  prochlorperazine   Tablet 10 milliGRAM(s) Oral daily PRN nausea or vomiting  sodium chloride 0.9% lock flush 10 milliLiter(s) IV Push every 1 hour PRN Pre/post blood products, medications, blood draw, and to maintain line patency    Allergies    No Known Allergies    Intolerances      Vital Signs Last 24 Hrs  T(C): 36.4 (24 Sep 2021 13:09), Max: 36.6 (23 Sep 2021 22:00)  T(F): 97.6 (24 Sep 2021 13:09), Max: 97.8 (23 Sep 2021 22:00)  HR: 89 (24 Sep 2021 13:09) (78 - 91)  BP: 99/64 (24 Sep 2021 13:09) (97/68 - 110/68)  RR: 17 (24 Sep 2021 13:09) (16 - 17)  SpO2: 97% (24 Sep 2021 13:09) (95% - 97%)  I&O's Summary    23 Sep 2021 07:01  -  24 Sep 2021 07:00  --------------------------------------------------------  IN: 0 mL / OUT: 600 mL / NET: -600 mL          TELE: Not on telemetry   PHYSICAL EXAM:  Appearance: NAD, no distress, alert,  HEENT: Moist Mucous Membranes, Anicteric  Cardiovascular: Regular rate and rhythm, Normal S1 S2, No JVD, No murmurs, No rubs, gallops or clicks  Respiratory: Non-labored, Clear to auscultation, No rales, No rhonchi, No wheezing.   Gastrointestinal:  Soft, Non-tender, + BS  Neurologic: Non-focal  Skin: Warm and dry, No visible rashes or ulcers, No ecchymosis, No cyanosis  Musculoskeletal: No clubbing, No cyanosis, No joint swelling/tenderness  Psychiatry: Mood & affect appropriate  Lymph: No peripheral edema.     LABS: All Labs Reviewed:                        8.6    4.22  )-----------( 257      ( 24 Sep 2021 09:14 )             28.2                         9.2    7.06  )-----------( 267      ( 23 Sep 2021 12:27 )             29.7                         8.6    5.15  )-----------( 233      ( 23 Sep 2021 08:25 )             27.0     24 Sep 2021 09:14    139    |  103    |  10     ----------------------------<  121    4.7     |  30     |  0.76   23 Sep 2021 08:25    139    |  105    |  8      ----------------------------<  94     4.4     |  30     |  0.62   22 Sep 2021 07:31    138    |  104    |  8      ----------------------------<  127    4.5     |  31     |  0.84     Ca    9.0        24 Sep 2021 09:14  Ca    8.4        23 Sep 2021 08:25  Ca    8.1        22 Sep 2021 07:31  Mg     2.1       24 Sep 2021 09:14    TPro  7.2    /  Alb  2.7    /  TBili  0.4    /  DBili  x      /  AST  19     /  ALT  12     /  AlkPhos  113    23 Sep 2021 08:25          12 Lead ECG:   Ventricular Rate 97 BPM  Atrial Rate 97 BPM  P-R Interval 130 ms  QRS Duration 122 ms  Q-T Interval 374 ms  QTC Calculation(Bazett) 474 m  P Axis 43 degrees  R Axis 35 degrees  T Axis 173 degrees  Diagnosis Line Normal sinus rhythm  Left bundle branch block  Confirmed by JAI RAND ([id number]) on 9/15/2021 10:39:54 AM (09-14-21 @ 20:29)    ra< from: Xray Foot AP + Lateral + Oblique, Right (09.20.21 @ 16:37) >    EXAM:  XR FOOT COMP MIN 3 VIEWS RT                            PROCEDURE DATE:  09/20/2021          INTERPRETATION:  RIGHT foot    CLINICAL INFORMATION: Postoperative radiographs.    TECHNIQUE: AP,lateral and oblique views.    FINDINGS:    Status post transmetatarsal amputation of the first hallux. Remaining osseous joint structures intact..    IMPRESSION:    Post transmetatarsal amputation first hallux.    --- End of Report ---            JODI ANDREWS MD; Attending Radiologist  This document has been electronically signed. Sep 23 2021  8:12AM    < end of copied text >  < from: TTE Echo Complete w/o Contrast w/ Doppler (09.18.21 @ 08:10) >     EXAM:  ECHO TTE WO CON COMP W DOPP         PROCEDURE DATE:  09/18/2021        INTERPRETATION:  INDICATION: Infectious endocarditis  Sonographer KL    Blood Pressure 108/71    Height 185.4 cm     Weight 97.5 kg       BSA 2.22 sq m    Dimensions:  LA 4.3       Normal Values: 2.0 - 4.0 cm  Ao 3.5        Normal Values: 2.0 - 3.8 cm  SEPTUM 1.3       Normal Values: 0.6 - 1.2 cm  PWT 1.2       Normal Values: 0.6 - 1.1 cm  LVIDd 4.9         Normal Values: 3.0 - 5.6 cm  LVIDs 4.3         Normal Values: 1.8 - 4.0 cm      OBSERVATIONS:  Technically difficult and very limited study  Mitral Valve: Mitral annular calcification and calcified leaflets with restricted opening. Mean transmitral valve gradient equals 10.6 mmHg which is consistent with severe mitral stenosis.  Mild MR.  Aortic Valve/Aorta: Patient has a history of bioprosthetic aortic valve replacement. Peak transaortic valve gradient 21 mmHg with a mean transaortic valve gradient of 11 mmHg. This is probably normal in the setting of a prosthetic valve  Tricuspid Valve: Not well-visualized  Pulmonic Valve: Not well-visualized  Left Atrium: Enlarged  Right Atrium: Not well-visualized  Left Ventricle: The left ventricular endocardium is not well-visualized. Unable to accurately assess left ventricular function. If clinically indicated can consider further imaging with echo contrast  Right Ventricle: The right ventricle is not well-visualized. A device wire is noted within the right heart  Pericardium: no significant pericardial effusion.  Pulmonary/RV Pressure: estimated PA systolic pressure of 39 mmHg        IMPRESSION:  Technically difficult and very limited study  The left ventricular endocardium is not well-visualized. Unable to accurately assess left ventricular function. If clinically indicated can consider further imaging with echo contrast  The right ventricle is not well-visualized. A device wire is noted within the right heart  Patient has a history of bioprosthetic aortic valve replacement. Peak transaortic valvegradient 21 mmHg with a mean transaortic valve gradient of 11 mmHg. This is probably normal in the setting of a prosthetic valve  Calcified mitral valve with restricted opening. Valve gradients are consistent with severe mitral stenosis  Mild MR  Left atrial enlargement      --- End of Report ---              TASHA RENDON MD; Attending Cardiologist  This document has been electronically signed. Sep 18 2021  8:43P    < end of copied text >

## 2021-09-24 NOTE — PROGRESS NOTE ADULT - PROBLEM SELECTOR PLAN 1
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 López, DOS 9/2021- primarily closed   - patient seen and evaluated  - post op complication of hematoma, remove 4 sutures centrally and removed the hematoma per Dr. Felipe on 9/23/2021  - dressing taken off with care and to patient's tolerance  - area copiously irrigated with bulb syringe with 2L normal saline and area pat dry with sterile gauze   - Area dressed with iodoform packing and DSD secured with ACE bandage   - patient states that after removal, area had relief of pressure and pain   - Follow up with the OR pathologies and cultures  - Follow up with infectious disease recommendations  - Wound Care Instructions below   - Podiatry team will continue to follow patient while in house  WOUND CARE INSTRUCTIONS   1. Keep dressing clean, dry and intact at all times until first follow up appointment   2.  monitor for any signs of infection.  3. Patient is to [facility] with Dr. Felipe or Dr. López within 1 week upon discharge.

## 2021-09-24 NOTE — PROGRESS NOTE ADULT - SUBJECTIVE AND OBJECTIVE BOX
Patient is a 80y old  Male who presents with a chief complaint of right hallux ulcer/cellulitis (23 Sep 2021 17:11)      Subjective:  INTERVAL HPI/OVERNIGHT EVENTS: Patient seen and examined at bedside. No overnight events occurred. Patient has no complaints at this time. Denies fevers, chills, headache, lightheadedness, chest pain, dyspnea, abdominal pain, n/v/d/c.    MEDICATIONS  (STANDING):  ceFAZolin   IVPB 2000 milliGRAM(s) IV Intermittent every 8 hours  chlorhexidine 4% Liquid 1 Application(s) Topical <User Schedule>  cholecalciferol 400 Unit(s) Oral daily  dorzolamide 2%/timolol 0.5% Ophthalmic Solution 1 Drop(s) Both EYES two times a day  DULoxetine 60 milliGRAM(s) Oral daily  furosemide    Tablet 20 milliGRAM(s) Oral daily  gabapentin 300 milliGRAM(s) Oral two times a day  ketorolac 0.5% Ophthalmic Solution 1 Drop(s) Both EYES daily  latanoprost 0.005% Ophthalmic Solution 1 Drop(s) Both EYES at bedtime  lidocaine   4% Patch 1 Patch Transdermal daily  melatonin 5 milliGRAM(s) Oral at bedtime  multivitamin 1 Tablet(s) Oral daily  pancrelipase  (CREON 36,000 Lipase Units) 2 Capsule(s) Oral three times a day with meals  pantoprazole    Tablet 40 milliGRAM(s) Oral before breakfast  pyridoxine 100 milliGRAM(s) Oral daily  rivaroxaban 20 milliGRAM(s) Oral with dinner  sacubitril 49 mG/valsartan 51 mG 1 Tablet(s) Oral two times a day  sotalol 40 milliGRAM(s) Oral two times a day  tamsulosin 0.4 milliGRAM(s) Oral at bedtime    MEDICATIONS  (PRN):  acetaminophen   Tablet .. 650 milliGRAM(s) Oral every 6 hours PRN Temp greater or equal to 38C (100.4F), Mild Pain (1 - 3)  ALPRAZolam 0.25 milliGRAM(s) Oral at bedtime PRN anxiety  oxycodone    5 mG/acetaminophen 325 mG 1 Tablet(s) Oral every 6 hours PRN Moderate Pain (4 - 6)  prochlorperazine   Tablet 10 milliGRAM(s) Oral daily PRN nausea or vomiting  sodium chloride 0.9% lock flush 10 milliLiter(s) IV Push every 1 hour PRN Pre/post blood products, medications, blood draw, and to maintain line patency      Allergies    No Known Allergies    Intolerances        REVIEW OF SYSTEMS:  CONSTITUTIONAL: No fever or chills  HEENT:  No headache, no sore throat  RESPIRATORY: No cough, wheezing, or shortness of breath  CARDIOVASCULAR: No chest pain, palpitations  GASTROINTESTINAL: No abd pain, nausea, vomiting, or diarrhea  GENITOURINARY: No dysuria, frequency, or hematuria  NEUROLOGICAL: no focal weakness or dizziness  MUSCULOSKELETAL: +R foot pain      Objective:  Vital Signs Last 24 Hrs  T(C): 36.4 (24 Sep 2021 05:40), Max: 36.6 (23 Sep 2021 22:00)  T(F): 97.5 (24 Sep 2021 05:40), Max: 97.8 (23 Sep 2021 22:00)  HR: 78 (24 Sep 2021 08:14) (78 - 91)  BP: 97/68 (24 Sep 2021 08:14) (97/68 - 110/68)  BP(mean): --  RR: 17 (24 Sep 2021 05:40) (16 - 17)  SpO2: 95% (24 Sep 2021 05:40) (95% - 96%)    PHYSICAL EXAM:   GENERAL: NAD  HEENT:  anicteric, moist mucous membranes  CHEST/LUNG:  CTA b/l, no rales, wheezes, or rhonchi  HEART:  RRR, S1/S2, +syst murmur  ABDOMEN:  BS+, soft overall but firm in epigastric region, nontender, nondistended  EXTREMITIES: no cyanosis or edema, +Radial pulse BL  NERVOUS SYSTEM: answers questions and follows commands appropriately  MSK: R foot wrapped in ace bandage Dressing C/D/I. Calves soft, nontender.      LABS:                        8.6    4.22  )-----------( 257      ( 24 Sep 2021 09:14 )             28.2     CBC Full  -  ( 24 Sep 2021 09:14 )  WBC Count : 4.22 K/uL  Hemoglobin : 8.6 g/dL  Hematocrit : 28.2 %  Platelet Count - Automated : 257 K/uL  Mean Cell Volume : 108.0 fl  Mean Cell Hemoglobin : 33.0 pg  Mean Cell Hemoglobin Concentration : 30.5 gm/dL  Auto Neutrophil # : 2.45 K/uL  Auto Lymphocyte # : 0.76 K/uL  Auto Monocyte # : 0.84 K/uL  Auto Eosinophil # : 0.13 K/uL  Auto Basophil # : 0.04 K/uL  Auto Neutrophil % : 56.0 %  Auto Lymphocyte % : 18.0 %  Auto Monocyte % : 20.0 %  Auto Eosinophil % : 3.0 %  Auto Basophil % : 1.0 %    24 Sep 2021 09:14    139    |  103    |  10     ----------------------------<  121    4.7     |  30     |  0.76     Ca    9.0        24 Sep 2021 09:14  Mg     2.1       24 Sep 2021 09:14          CAPILLARY BLOOD GLUCOSE            Culture - Tissue with Gram Stain (collected 09-21-21 @ 01:30)  Source: .Tissue Right hallux bone culutre  Gram Stain (09-21-21 @ 06:11):    Rare polymorphonuclear leukocytes seen per low power field    No organisms seen per oil power field  Preliminary Report (09-23-21 @ 18:06):    Few Pseudomonas aeruginosa    Rare Staphylococcus pettenkoferi    Culture - Tissue with Gram Stain (collected 09-21-21 @ 01:29)  Source: .Tissue right first metatarsal bone cu  Gram Stain (09-21-21 @ 06:11):    Rare polymorphonuclear leukocytes seen per low power field    No organisms seen per oil power field  Preliminary Report (09-23-21 @ 18:06):    Rare Pseudomonas aeruginosa    Rare Staphylococcus pettenkoferi  Organism: Pseudomonas aeruginosa (09-23-21 @ 15:36)  Organism: Pseudomonas aeruginosa (09-23-21 @ 15:36)      -  Amikacin: S <=16      -  Aztreonam: S 8      -  Cefepime: S <=2      -  Ceftazidime: S 4      -  Ciprofloxacin: S <=0.25      -  Gentamicin: S 4      -  Imipenem: S 2      -  Levofloxacin: S 1      -  Meropenem: S <=1      -  Piperacillin/Tazobactam: S <=8      -  Tobramycin: S <=2      Method Type: TYSON        RADIOLOGY & ADDITIONAL TESTS:    Personally reviewed.     Consultant(s) Notes Reviewed:  [x] YES  [ ] NO

## 2021-09-24 NOTE — PROGRESS NOTE ADULT - ASSESSMENT
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      #Cellulitis of right lower leg and Acute OM of right hallux  - MSSA bacteremia on admission BCx;   - repeat BCx from 9/16 are NGF  - Surgical pathology of right hallux: +acute Osteomyelitis, MSSA   - Podiatry/wound care following (Dr. Felipe/Rene) recs appreciated  - ID recs appreciated - c/w cefazolin 2gm q8h  - Sp R. hallux amputation 9/20    - Sp RRT 9/23 for ruptured hematoma @ surgical site during PT. Dr. Felipe, removed central sutures and irrigated hematoma, dressed with iodoform packing and surgicel NuKnit and DSD and secured with ACE bandage (wound left open to heal by secondary intention)  - Pain control  - Elevated leg to alleviate pain&swelling  - Transfer pending to [facility] for CORBIN and ICD extraction per cardiology    #HTN (hypertension).   - C/w Lasix 20 mg PO qd, Entresto 1 tab BID, Sotalol 40 mg PO BID with hold parameters  - BP low normal, continue to monitor off Bystolic ( at home on 5mg PO qd )  - Monitor VS    #Pulmonary embolism.   - Bilateral PE in 7/2020  - c/w home Xarelto 20mg     #CHF, chronic  - On Lasix 20 mg PO qd, Entresto 1 tab BID, Sotalol 40 mg PO BID with hold parameters  - Tolerating Room air  - no signs of acute volume overload  - Strict I/Os, daily weights  - Monitor and replace electrolytes.  - TTE very limited study, unable to assess LV or RV function, severe MS, bioAVR     #Hx of arrhythmia  - discussed with pt and his children the reason for his ICD placement  - they stated that >20 years ago, the pt had a very fast HR in the setting of a viral infection and the ICD was placed  - pt/family said he did not have a cardiac arrest; thus suspect pt had sustained VT with pulse and had ICD placement  - c/w sotalol     #Anemia (likely 2/2 chronic disease in the setting of pancreatic cancer)  - H/H 9.8/30.0 on admission, baseline hemoglobin unknown  - Currently hemodynamically stable, monitor for signs and symptoms of active bleeding  - Consider transfusion for hemoglobin <8  - resolved thrombocytopenia     #CAD (coronary artery disease).   - Chronic, s/p cardiac stents x2 (15 years ago)  - c/w home Xarelto 20mg     #Pancreatic cancer.   - diagnosed in March 2021. On chemotherapy (does not recall name of medication)  - Continue home medication Pancrelipase 36,000 2 tabs PO TID with meals  - Per podiatry, Dr. López spoke with Dr. Murphy at [facility] to be postponed until after resolution of infection.      #Dysphagia  - patient with complaints of coughing/choking on food on 9/18  - S/S consulted, recs dysphagia 3 soft-thin liquids    #DVT ppx  - Xarelto 20mg PO daily     80 year old male with PMHx pancreatic cancer (dx 3/2021, currently undergoing chemo), HTN, HLD, CHF (unknown EF), CAD s/p stents x2 (~15 years ago), defibrillator (>20 years ago, likely placed after episode of sustained VT without arrest per pt/family description), TAVR (4/2021), bilateral PE (7/2021) on Xarelto, spinal stenosis, GERD, BPH, macular degeneration presents to the ED c/o R foot pain admitted for R hallux pressure injury and RLE cellulitis found to have MSSA bacteremia and acute OM of right hallux.      #Cellulitis of right lower leg and Acute OM of right hallux  - MSSA bacteremia on admission BCx;   - repeat BCx from 9/16 are NGF  - Surgical pathology of right hallux: +acute Osteomyelitis, MSSA   - Podiatry/wound care following (Dr. Felipe/Rene) recs appreciated  - ID recs appreciated - c/w cefazolin 2gm q8h  - Sp R. hallux amputation 9/20    - Sp RRT 9/23 for ruptured hematoma @ surgical site during PT. Dr. Felipe, removed central sutures and irrigated hematoma, dressed with iodoform packing and surgicel NuKnit and DSD and secured with ACE bandage (wound left open to heal by secondary intention)  - Pain control  - Elevated leg to alleviate pain&swelling  - Transfer pending to [facility] for CORBIN and ICD extraction per cardiology    #HTN (hypertension).   - C/w Lasix 20 mg PO qd, Entresto 1 tab BID, Sotalol 40 mg PO BID with hold parameters  - BP low normal, continue to monitor off Bystolic ( at home on 5mg PO qd )  - Monitor VS    #Pulmonary embolism.   - Bilateral PE in 7/2020  - holding Xarelto 20mg for possible transfer and ICD extraction.  If still here tomorrow will start heparin gtt.    #CHF, chronic  - On Lasix 20 mg PO qd, Entresto 1 tab BID, Sotalol 40 mg PO BID with hold parameters  - Tolerating Room air  - no signs of acute volume overload  - Strict I/Os, daily weights  - Monitor and replace electrolytes.  - TTE very limited study, unable to assess LV or RV function, severe MS, bioAVR     #Hx of arrhythmia  - discussed with pt and his children the reason for his ICD placement  - they stated that >20 years ago, the pt had a very fast HR in the setting of a viral infection and the ICD was placed  - pt/family said he did not have a cardiac arrest; thus suspect pt had sustained VT with pulse and had ICD placement  - c/w sotalol     #Anemia (likely 2/2 chronic disease in the setting of pancreatic cancer)  - H/H 9.8/30.0 on admission, baseline hemoglobin unknown  - Currently hemodynamically stable, monitor for signs and symptoms of active bleeding  - Consider transfusion for hemoglobin <8  - resolved thrombocytopenia     #CAD (coronary artery disease).   - Chronic, s/p cardiac stents x2 (15 years ago)  - holding  Xarelto 20mg for possible ICD extraction     #Pancreatic cancer.   - diagnosed in March 2021. On chemotherapy (does not recall name of medication)  - Continue home medication Pancrelipase 36,000 2 tabs PO TID with meals  - Per podiatry, Dr. López spoke with Dr. Murphy at [facility] to be postponed until after resolution of infection.      #Dysphagia  - patient with complaints of coughing/choking on food on 9/18  - S/S consulted, recs dysphagia 3 soft-thin liquids    #DVT ppx  - Start heparin gtt if still present tomorrow.

## 2021-09-24 NOTE — PROGRESS NOTE ADULT - SUBJECTIVE AND OBJECTIVE BOX
80y year old Male seen at [facility] 3WES 355 D1 for s/p Right 1st partial ray resection secondary to acute OM with Dr. López, DOS 9/2021. The patient dressing is clean, dry and intact. The patient states that he has been compliant with non weight bearing to the right lower extremity and having the right lower extremity elevated with the pillow.  . Denies any fever, chills, nausea, vomiting, chest pain, shortness of breath, or calf pain at this time.    Allergies    No Known Allergies    Intolerances        MEDICATIONS  (STANDING):  ceFAZolin   IVPB 2000 milliGRAM(s) IV Intermittent every 8 hours  cholecalciferol 400 Unit(s) Oral daily  dorzolamide 2%/timolol 0.5% Ophthalmic Solution 1 Drop(s) Both EYES two times a day  DULoxetine 60 milliGRAM(s) Oral daily  furosemide    Tablet 20 milliGRAM(s) Oral daily  gabapentin 300 milliGRAM(s) Oral two times a day  heparin  Infusion.  Unit(s)/Hr (18 mL/Hr) IV Continuous <Continuous>  ketorolac 0.5% Ophthalmic Solution 1 Drop(s) Both EYES daily  latanoprost 0.005% Ophthalmic Solution 1 Drop(s) Both EYES at bedtime  melatonin 5 milliGRAM(s) Oral at bedtime  multivitamin 1 Tablet(s) Oral daily  pancrelipase  (CREON 36,000 Lipase Units) 2 Capsule(s) Oral three times a day with meals  pantoprazole    Tablet 40 milliGRAM(s) Oral before breakfast  pyridoxine 100 milliGRAM(s) Oral daily  sacubitril 49 mG/valsartan 51 mG 1 Tablet(s) Oral two times a day  sotalol 40 milliGRAM(s) Oral two times a day  tamsulosin 0.4 milliGRAM(s) Oral at bedtime    MEDICATIONS  (PRN):  acetaminophen   Tablet .. 650 milliGRAM(s) Oral every 6 hours PRN Temp greater or equal to 38C (100.4F), Mild Pain (1 - 3)  ALPRAZolam 0.25 milliGRAM(s) Oral at bedtime PRN anxiety  heparin   Injectable 8000 Unit(s) IV Push every 6 hours PRN For aPTT less than 40  heparin   Injectable 4000 Unit(s) IV Push every 6 hours PRN For aPTT between 40 - 57  oxycodone    5 mG/acetaminophen 325 mG 1 Tablet(s) Oral every 6 hours PRN Moderate Pain (4 - 6)  prochlorperazine   Tablet 10 milliGRAM(s) Oral daily PRN nausea or vomiting    Vital Signs Last 24 Hrs  T(C): 36.4 (24 Sep 2021 13:09), Max: 36.6 (23 Sep 2021 22:00)  T(F): 97.6 (24 Sep 2021 13:09), Max: 97.8 (23 Sep 2021 22:00)  HR: 89 (24 Sep 2021 13:09) (78 - 91)  BP: 99/64 (24 Sep 2021 13:09) (97/68 - 110/68)  BP(mean): --  RR: 17 (24 Sep 2021 13:09) (16 - 17)  SpO2: 97% (24 Sep 2021 13:09) (95% - 97%)        PHYSICAL EXAM:  Vascular: DP and PT palpable, diffuse edema and residual erythema along the right medial forefoot, no digital hair present, skin temperature within normal limits   Neurological: Loss of protective sensation b/l  Musculoskeletal: 5/5 muscle strength in all 4 quadrants b/l, no pain upon palpation along the surgical site, s/p Right 1st partial ray resection   Dermatological:  1. S/p Right 1st partial ray resection- primarily closed proximally and distally, wound dehscience centrally secondary to hematoma(which was removed ) size 2.5cmx0.3cmx0.3cm- serous sanguineous drainage, more sanguineous than serous,  diffuse edema and erythema, sutures intact, skin well approximated no malodor, no streaking, no signs of infection     CBC Full  -  ( 24 Sep 2021 09:14 )  WBC Count : 4.22 K/uL  RBC Count : 2.61 M/uL  Hemoglobin : 8.6 g/dL  Hematocrit : 28.2 %  Platelet Count - Automated : 257 K/uL  Mean Cell Volume : 108.0 fl  Mean Cell Hemoglobin : 33.0 pg  Mean Cell Hemoglobin Concentration : 30.5 gm/dL  Auto Neutrophil # : 2.45 K/uL  Auto Lymphocyte # : 0.76 K/uL  Auto Monocyte # : 0.84 K/uL  Auto Eosinophil # : 0.13 K/uL  Auto Basophil # : 0.04 K/uL  Auto Neutrophil % : 56.0 %  Auto Lymphocyte % : 18.0 %  Auto Monocyte % : 20.0 %  Auto Eosinophil % : 3.0 %  Auto Basophil % : 1.0 %      ----------CHEM PANEL----------    09-24    139  |  103  |  10  ----------------------------<  121<H>  4.7   |  30  |  0.76    Ca    9.0      24 Sep 2021 09:14  Mg     2.1     09-24    TPro  7.2  /  Alb  2.7<L>  /  TBili  0.4  /  DBili  x   /  AST  19  /  ALT  12  /  AlkPhos  113  09-23          Culture - Tissue with Gram Stain (collected 21 Sep 2021 01:30)  Source: .Tissue Right hallux bone culutre  Gram Stain (21 Sep 2021 06:11):    Rare polymorphonuclear leukocytes seen per low power field    No organisms seen per oil power field    Culture - Tissue with Gram Stain (collected 21 Sep 2021 01:29)  Source: .Tissue right first metatarsal bone cu  Gram Stain (21 Sep 2021 06:11):    Rare polymorphonuclear leukocytes seen per low power field    No organisms seen per oil power field        Imaging: ----------

## 2021-09-24 NOTE — PROGRESS NOTE ADULT - SUBJECTIVE AND OBJECTIVE BOX
[facility]  INFECTIOUS DISEASES   [address] [place] [address]  Tel: [phone]     Fax: [phone]  =======================================================    [id number]  JUANIS DE SANTIAGO     Follow up: right hallux cellulitis and bacteremia     This morning walked with PT and the foot started bleeding.   Seen by Dr. Felipe and wound was packed.   No fever.   Had bone biopsy on 9/15, showing acute osteomyelitis.     PAST MEDICAL & SURGICAL HISTORY:  HTN (hypertension)  HLD (hyperlipidemia)  GERD (gastroesophageal reflux disease)  Cardiomyopathy  History of total hip replacement left  History of laminectomy  ICD (implantable cardiac defibrillator) in place  Benign testicular tumor  History of hip replacement    Social Hx: no smoking, ETOH or drugs     FAMILY HISTORY:  Family history of stroke (Mother)    Allergies  No Known Allergies    Antibiotics:  cefazolin      REVIEW OF SYSTEMS:  CONSTITUTIONAL:  No Fever or chills  HEENT:  No diplopia or blurred vision.  No sore throat or runny nose.  CARDIOVASCULAR:  No chest pain or SOB.  RESPIRATORY:  No cough, shortness of breath, PND or orthopnea.  GASTROINTESTINAL:  No nausea, vomiting or diarrhea.  GENITOURINARY:  No dysuria, frequency or urgency. No Blood in urine  MUSCULOSKELETAL: foot ulcer   SKIN:  No change in skin, hair or nails.  NEUROLOGIC:  No paresthesias, fasciculations, seizures or weakness.  PSYCHIATRIC:  No disorder of thought or mood.  ENDOCRINE:  No heat or cold intolerance, polyuria or polydipsia.  HEMATOLOGICAL:  No easy bruising or bleeding.     Physical Exam:  Vital Signs Last 24 Hrs  T(C): 36.4 (23 Sep 2021 12:45), Max: 36.5 (22 Sep 2021 21:59)  T(F): 97.5 (23 Sep 2021 12:45), Max: 97.7 (22 Sep 2021 21:59)  HR: 79 (23 Sep 2021 12:45) (77 - 85)  BP: 98/64 (23 Sep 2021 12:45) (98/64 - 137/75)  BP(mean): --  RR: 17 (23 Sep 2021 12:45) (16 - 17)  SpO2: 96% (23 Sep 2021 12:45) (95% - 96%)  GEN: NAD  HEENT: normocephalic and atraumatic. EOMI. PERRL.    NECK: Supple.  No lymphadenopathy   LUNGS: Clear to auscultation.  HEART: Regular rate and rhythm without murmur.  ABDOMEN: Soft, nontender, and nondistended.  Positive bowel sounds.    : No CVA tenderness  EXTREMITIES: R foot dressed after amputation of hallux   NEUROLOGIC: grossly intact.  PSYCHIATRIC: Appropriate affect .  SKIN: No ulceration or induration present.    Labs:                        8.6    4.22  )-----------( 257      ( 24 Sep 2021 09:14 )             28.2     09-24    139  |  103  |  10  ----------------------------<  121<H>  4.7   |  30  |  0.76    Ca    9.0      24 Sep 2021 09:14  Mg     2.1     09-24    TPro  7.2  /  Alb  2.7<L>  /  TBili  0.4  /  DBili  x   /  AST  19  /  ALT  12  /  AlkPhos  113  09-23    Culture - Tissue with Gram Stain (collected 09-21-21 @ 01:30)  Source: .Tissue Right hallux bone culutre  Gram Stain (09-21-21 @ 06:11):    Rare polymorphonuclear leukocytes seen per low power field    No organisms seen per oil power field    Culture - Tissue with Gram Stain (collected 09-21-21 @ 01:29)  Source: .Tissue right first metatarsal bone cu  Gram Stain (09-21-21 @ 06:11):    Rare polymorphonuclear leukocytes seen per low power field    No organisms seen per oil power field  Organism: Pseudomonas aeruginosa (09-23-21 @ 15:36)  Organism: Pseudomonas aeruginosa (09-23-21 @ 15:36)    Sensitivities:      -  Amikacin: S <=16      -  Aztreonam: S 8      -  Cefepime: S <=2      -  Ceftazidime: S 4      -  Ciprofloxacin: S <=0.25      -  Gentamicin: S 4      -  Imipenem: S 2      -  Levofloxacin: S 1      -  Meropenem: S <=1      -  Piperacillin/Tazobactam: S <=8      -  Tobramycin: S <=2      Method Type: TYSON    Culture - Blood (collected 09-16-21 @ 12:01)  Source: .Blood Blood-Peripheral  Final Report (09-21-21 @ 13:00):    No Growth Final    Culture - Blood (collected 09-16-21 @ 12:01)  Source: .Blood Blood-Peripheral  Final Report (09-21-21 @ 13:00):    No Growth Final    Culture - Tissue with Gram Stain (collected 09-16-21 @ 01:16)  Source: .Tissue right hallux bone culture  Gram Stain (09-16-21 @ 04:48):    Rare polymorphonuclear leukocytes seen per low power field    No organisms seen per oil power field  Final Report (09-21-21 @ 10:11):    Few Staphylococcus aureus  Organism: Staphylococcus aureus (09-21-21 @ 10:11)  Organism: Staphylococcus aureus (09-21-21 @ 10:11)    Sensitivities:      -  Ampicillin/Sulbactam: S <=8/4      -  Cefazolin: S <=4      -  Clindamycin: S <=0.25      -  Erythromycin: S <=0.25      -  Gentamicin: S 2 Should not be used as monotherapy      -  Oxacillin: S <=0.25      -  RIF- Rifampin: S <=1 Should not be used as monotherapy      -  Tetra/Doxy: S <=1      -  Trimethoprim/Sulfamethoxazole: S <=0.5/9.5      -  Vancomycin: S 2      Method Type: TYSON    Culture - Urine (collected 09-15-21 @ 04:15)  Source: Clean Catch Clean Catch (Midstream)  Final Report (09-16-21 @ 00:06):    <10,000 CFU/mL Normal Urogenital Juani    Culture - Blood (collected 09-15-21 @ 01:20)  Source: .Blood Blood-Peripheral  Gram Stain (09-16-21 @ 01:09):    Growth in aerobic bottle: Gram Positive Cocci in Clusters    Growth in anaerobic bottle: Gram Positive Cocci in Clusters  Final Report (09-17-21 @ 16:59):    Growth in aerobic and anaerobic bottles: Staphylococcus aureus    ***Blood Panel PCR results on this specimen are available    approximately 3 hours after the Gram stain result.***    Gram stain, PCR, and/or culture results may not always    correspond dueto difference in methodologies.    ************************************************************    This PCR assay was performed by multiplex PCR. This    Assay tests for 66 bacterial and resistance gene targets.    Please refer to the [facility] Labs test directory    at [email or web address] for details.  Organism: Blood Culture PCR  Staphylococcus aureus (09-17-21 @ 16:59)  Organism: Staphylococcus aureus (09-17-21 @ 16:59)    Sensitivities:      -  Ampicillin/Sulbactam: S <=8/4      -  Cefazolin: S <=4      -  Clindamycin: R <=0.25 This isolate is presumed to be clindamycin resistant based on detection of inducible resistance. Clindamycin may still be effective in some patients.      -  Erythromycin: I 1      -  Gentamicin: S <=1 Should not be used as monotherapy      -  Oxacillin: S 0.5      -  RIF- Rifampin: S <=1 Should not be used as monotherapy      -  Tetra/Doxy: S <=1      -  Trimethoprim/Sulfamethoxazole: S <=0.5/9.5      -  Vancomycin: S 2      Method Type: TYSON  Organism: Blood Culture PCR (09-17-21 @ 16:59)    Sensitivities:      -  Staphylococcus aureus: Detec Any isolate of Staphylococcus aureus from a blood culture is NOT considered a contaminant.      Method Type: PCR    Culture - Blood (collected 09-15-21 @ 01:20)  Source: .Blood Blood-Peripheral  Gram Stain (09-15-21 @ 22:34):    Growth in aerobic bottle: Gram Positive Cocci in Clusters  Final Report (09-17-21 @ 17:41):    Growth in aerobic bottle: Staphylococcus aureus    See previous culture [id number]    WBC Count: 4.22 K/uL (09-24-21 @ 09:14)  WBC Count: 7.06 K/uL (09-23-21 @ 12:27)  WBC Count: 5.15 K/uL (09-23-21 @ 08:25)  WBC Count: 4.04 K/uL (09-22-21 @ 07:31)  WBC Count: 4.44 K/uL (09-21-21 @ 07:19)  WBC Count: 3.44 K/uL (09-20-21 @ 07:22)    Creatinine, Serum: 0.76 mg/dL (09-24-21 @ 09:14)  Creatinine, Serum: 0.62 mg/dL (09-23-21 @ 08:25)  Creatinine, Serum: 0.84 mg/dL (09-22-21 @ 07:31)  Creatinine, Serum: 0.69 mg/dL (09-21-21 @ 18:33)  Creatinine, Serum: 0.60 mg/dL (09-20-21 @ 07:22)    C-Reactive Protein, Serum: 168 mg/L (09-15-21 @ 07:50)    Ferritin, Serum: 585 ng/mL (09-15-21 @ 11:30)    COVID-19 PCR: NotDetec (09-23-21 @ 08:33)  COVID-19 PCR: NotDetec (09-20-21 @ 12:59)  COVID-19 PCR: NotDetec (09-14-21 @ 20:17)    All imaging and other data have been reviewed.  < from: Xray Tibia + Fibula 2 Views, Right (09.14.21 @ 20:54) >  IMPRESSION: No acute chest finding. Ankle edema and degenerative changes in the foot again seen. Fairly advanced right knee degeneration also again noted.    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 Patient states he was advised to go the wound care center by his oncologist at [place] due to increased redness and edema to right lower leg.     ESR 53  Xray with degenerative changes   s/p debridement and bone biopsy on 9/15 with acute OM  Tissue culture before surgery MSSA  Blood culture on 9/15 MSSA  OR cutlure CoNS and pseudomonas, I believe they are contamination, even though pseudomonas is rarely contaminant but since blood culture and  tissue culture before surgery grew MSSA, will treat MSSA for 6 weeks.     R lower leg cellulitis, R hallux wound    Recommendations:   - Blood culture with MSSA on 9/15  - Blood culture NGTD on 9/16  - Wound culture MSSA  - S/P R 1st toe amputation on 9/20, culture with CoNS and pseudomonas I don't believe they are real pathogens for this case   - Continue cefazolin 2gm q8h  - TTE negative, CORBIN due to history of TAVR is indicated.   - Even with clean amputation margines will treat for 6 weeks unless CORBIN done and is negative.  - No need for rifampin or any other ABx since there is other source for infection and TTE is negative for vegetation.   - PICC in place.    - last day would be 10/27    Will follow.    Jaren Bell MD  Division of Infectious Diseases   Cell [phone] between 8am and 6pm   After 6pm and weekends please call ID service at [phone].

## 2021-09-25 LAB
ANION GAP SERPL CALC-SCNC: 3 MMOL/L — LOW (ref 5–17)
APTT BLD: 141.3 SEC — CRITICAL HIGH (ref 27.5–35.5)
APTT BLD: 35.1 SEC — SIGNIFICANT CHANGE UP (ref 27.5–35.5)
BASOPHILS # BLD AUTO: 0.03 K/UL — SIGNIFICANT CHANGE UP (ref 0–0.2)
BASOPHILS NFR BLD AUTO: 0.9 % — SIGNIFICANT CHANGE UP (ref 0–2)
BUN SERPL-MCNC: 12 MG/DL — SIGNIFICANT CHANGE UP (ref 7–23)
CALCIUM SERPL-MCNC: 8.8 MG/DL — SIGNIFICANT CHANGE UP (ref 8.5–10.1)
CHLORIDE SERPL-SCNC: 105 MMOL/L — SIGNIFICANT CHANGE UP (ref 96–108)
CO2 SERPL-SCNC: 30 MMOL/L — SIGNIFICANT CHANGE UP (ref 22–31)
CREAT SERPL-MCNC: 0.77 MG/DL — SIGNIFICANT CHANGE UP (ref 0.5–1.3)
EOSINOPHIL # BLD AUTO: 0.04 K/UL — SIGNIFICANT CHANGE UP (ref 0–0.5)
EOSINOPHIL NFR BLD AUTO: 1.2 % — SIGNIFICANT CHANGE UP (ref 0–6)
GLUCOSE SERPL-MCNC: 91 MG/DL — SIGNIFICANT CHANGE UP (ref 70–99)
HCT VFR BLD CALC: 27.9 % — LOW (ref 39–50)
HCT VFR BLD CALC: 28.1 % — LOW (ref 39–50)
HGB BLD-MCNC: 8.7 G/DL — LOW (ref 13–17)
HGB BLD-MCNC: 8.8 G/DL — LOW (ref 13–17)
IMM GRANULOCYTES NFR BLD AUTO: 0.6 % — SIGNIFICANT CHANGE UP (ref 0–1.5)
INR BLD: 1.2 RATIO — HIGH (ref 0.88–1.16)
LYMPHOCYTES # BLD AUTO: 0.72 K/UL — LOW (ref 1–3.3)
LYMPHOCYTES # BLD AUTO: 21.2 % — SIGNIFICANT CHANGE UP (ref 13–44)
MCHC RBC-ENTMCNC: 31 GM/DL — LOW (ref 32–36)
MCHC RBC-ENTMCNC: 31.5 GM/DL — LOW (ref 32–36)
MCHC RBC-ENTMCNC: 32.8 PG — SIGNIFICANT CHANGE UP (ref 27–34)
MCHC RBC-ENTMCNC: 33.5 PG — SIGNIFICANT CHANGE UP (ref 27–34)
MCV RBC AUTO: 106 FL — HIGH (ref 80–100)
MCV RBC AUTO: 106.1 FL — HIGH (ref 80–100)
MONOCYTES # BLD AUTO: 0.67 K/UL — SIGNIFICANT CHANGE UP (ref 0–0.9)
MONOCYTES NFR BLD AUTO: 19.8 % — HIGH (ref 2–14)
NEUTROPHILS # BLD AUTO: 1.91 K/UL — SIGNIFICANT CHANGE UP (ref 1.8–7.4)
NEUTROPHILS NFR BLD AUTO: 56.3 % — SIGNIFICANT CHANGE UP (ref 43–77)
NRBC # BLD: 0 /100 WBCS — SIGNIFICANT CHANGE UP (ref 0–0)
NRBC # BLD: 0 /100 WBCS — SIGNIFICANT CHANGE UP (ref 0–0)
PLATELET # BLD AUTO: 286 K/UL — SIGNIFICANT CHANGE UP (ref 150–400)
PLATELET # BLD AUTO: 290 K/UL — SIGNIFICANT CHANGE UP (ref 150–400)
POTASSIUM SERPL-MCNC: 4.8 MMOL/L — SIGNIFICANT CHANGE UP (ref 3.5–5.3)
POTASSIUM SERPL-SCNC: 4.8 MMOL/L — SIGNIFICANT CHANGE UP (ref 3.5–5.3)
PROTHROM AB SERPL-ACNC: 13.9 SEC — HIGH (ref 10.6–13.6)
RBC # BLD: 2.63 M/UL — LOW (ref 4.2–5.8)
RBC # BLD: 2.65 M/UL — LOW (ref 4.2–5.8)
RBC # FLD: 15.4 % — HIGH (ref 10.3–14.5)
RBC # FLD: 15.4 % — HIGH (ref 10.3–14.5)
SODIUM SERPL-SCNC: 138 MMOL/L — SIGNIFICANT CHANGE UP (ref 135–145)
WBC # BLD: 3.39 K/UL — LOW (ref 3.8–10.5)
WBC # BLD: 4.44 K/UL — SIGNIFICANT CHANGE UP (ref 3.8–10.5)
WBC # FLD AUTO: 3.39 K/UL — LOW (ref 3.8–10.5)
WBC # FLD AUTO: 4.44 K/UL — SIGNIFICANT CHANGE UP (ref 3.8–10.5)

## 2021-09-25 PROCEDURE — 99232 SBSQ HOSP IP/OBS MODERATE 35: CPT

## 2021-09-25 PROCEDURE — 99232 SBSQ HOSP IP/OBS MODERATE 35: CPT | Mod: GC

## 2021-09-25 PROCEDURE — 99233 SBSQ HOSP IP/OBS HIGH 50: CPT

## 2021-09-25 RX ORDER — HEPARIN SODIUM 5000 [USP'U]/ML
8000 INJECTION INTRAVENOUS; SUBCUTANEOUS EVERY 6 HOURS
Refills: 0 | Status: DISCONTINUED | OUTPATIENT
Start: 2021-09-25 | End: 2021-09-26

## 2021-09-25 RX ORDER — RIVAROXABAN 15 MG-20MG
1 KIT ORAL
Qty: 0 | Refills: 0 | DISCHARGE

## 2021-09-25 RX ORDER — HEPARIN SODIUM 5000 [USP'U]/ML
0 INJECTION INTRAVENOUS; SUBCUTANEOUS
Qty: 0 | Refills: 0 | DISCHARGE
Start: 2021-09-25

## 2021-09-25 RX ORDER — HEPARIN SODIUM 5000 [USP'U]/ML
4000 INJECTION INTRAVENOUS; SUBCUTANEOUS EVERY 6 HOURS
Refills: 0 | Status: DISCONTINUED | OUTPATIENT
Start: 2021-09-25 | End: 2021-09-26

## 2021-09-25 RX ORDER — HEPARIN SODIUM 5000 [USP'U]/ML
INJECTION INTRAVENOUS; SUBCUTANEOUS
Qty: 25000 | Refills: 0 | Status: DISCONTINUED | OUTPATIENT
Start: 2021-09-25 | End: 2021-09-26

## 2021-09-25 RX ADMIN — Medication 1 TABLET(S): at 11:54

## 2021-09-25 RX ADMIN — Medication 5 MILLIGRAM(S): at 22:04

## 2021-09-25 RX ADMIN — OXYCODONE AND ACETAMINOPHEN 1 TABLET(S): 5; 325 TABLET ORAL at 22:09

## 2021-09-25 RX ADMIN — GABAPENTIN 300 MILLIGRAM(S): 400 CAPSULE ORAL at 17:18

## 2021-09-25 RX ADMIN — Medication 40 MILLIGRAM(S): at 18:31

## 2021-09-25 RX ADMIN — TAMSULOSIN HYDROCHLORIDE 0.4 MILLIGRAM(S): 0.4 CAPSULE ORAL at 22:03

## 2021-09-25 RX ADMIN — OXYCODONE AND ACETAMINOPHEN 1 TABLET(S): 5; 325 TABLET ORAL at 23:00

## 2021-09-25 RX ADMIN — DULOXETINE HYDROCHLORIDE 60 MILLIGRAM(S): 30 CAPSULE, DELAYED RELEASE ORAL at 11:54

## 2021-09-25 RX ADMIN — HEPARIN SODIUM 1800 UNIT(S)/HR: 5000 INJECTION INTRAVENOUS; SUBCUTANEOUS at 09:45

## 2021-09-25 RX ADMIN — Medication 100 MILLIGRAM(S): at 05:28

## 2021-09-25 RX ADMIN — Medication 100 MILLIGRAM(S): at 11:55

## 2021-09-25 RX ADMIN — OXYCODONE AND ACETAMINOPHEN 1 TABLET(S): 5; 325 TABLET ORAL at 12:05

## 2021-09-25 RX ADMIN — HEPARIN SODIUM 0 UNIT(S)/HR: 5000 INJECTION INTRAVENOUS; SUBCUTANEOUS at 17:24

## 2021-09-25 RX ADMIN — Medication 100 MILLIGRAM(S): at 22:04

## 2021-09-25 RX ADMIN — Medication 0.25 MILLIGRAM(S): at 23:08

## 2021-09-25 RX ADMIN — DORZOLAMIDE HYDROCHLORIDE TIMOLOL MALEATE 1 DROP(S): 20; 5 SOLUTION/ DROPS OPHTHALMIC at 05:29

## 2021-09-25 RX ADMIN — Medication 1 DROP(S): at 11:55

## 2021-09-25 RX ADMIN — PANTOPRAZOLE SODIUM 40 MILLIGRAM(S): 20 TABLET, DELAYED RELEASE ORAL at 05:29

## 2021-09-25 RX ADMIN — Medication 100 MILLIGRAM(S): at 14:29

## 2021-09-25 RX ADMIN — LIDOCAINE 1 PATCH: 4 CREAM TOPICAL at 19:00

## 2021-09-25 RX ADMIN — Medication 40 MILLIGRAM(S): at 05:35

## 2021-09-25 RX ADMIN — Medication 2 CAPSULE(S): at 08:55

## 2021-09-25 RX ADMIN — CHLORHEXIDINE GLUCONATE 1 APPLICATION(S): 213 SOLUTION TOPICAL at 05:29

## 2021-09-25 RX ADMIN — DORZOLAMIDE HYDROCHLORIDE TIMOLOL MALEATE 1 DROP(S): 20; 5 SOLUTION/ DROPS OPHTHALMIC at 17:12

## 2021-09-25 RX ADMIN — HEPARIN SODIUM 1500 UNIT(S)/HR: 5000 INJECTION INTRAVENOUS; SUBCUTANEOUS at 18:30

## 2021-09-25 RX ADMIN — Medication 2 CAPSULE(S): at 11:54

## 2021-09-25 RX ADMIN — Medication 400 UNIT(S): at 11:54

## 2021-09-25 RX ADMIN — Medication 2 CAPSULE(S): at 17:12

## 2021-09-25 RX ADMIN — Medication 20 MILLIGRAM(S): at 05:29

## 2021-09-25 RX ADMIN — GABAPENTIN 300 MILLIGRAM(S): 400 CAPSULE ORAL at 05:28

## 2021-09-25 RX ADMIN — LIDOCAINE 1 PATCH: 4 CREAM TOPICAL at 11:55

## 2021-09-25 NOTE — PROGRESS NOTE ADULT - ASSESSMENT
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    Cellulitis of right lower leg and Acute OM of right hallux  - s/p right 1st partial ray resection.  Tolerated procedure well with no obvious cardiac complications  - Pain control  - MSSA bacteremia on admission BCx; repeat BCx from 9/16 are NGTD  - ID recommending CORBIN in the setting of TAVR and ICD.   -Discussed  with Dr. Quiles in [facility] re: ICD possible ICD extraction.    - Pt agreeable to transfer to [place] for CORBIN, arranged for today   - Surgical pathology of right hallux: +acute Osteomyelitis, staph aureus   - Podiatry/wound care follow up  - Abx per ID.  Follow recs    Pulmonary embolism.   - Bilateral PE in 7/2020, on Xarelto   - stopped  Xarelto, on heparin  gtt pending CORBIN     Chronic systolic HF S/P ICD, Severe MS  - Echo not well visualized LV, severe Mitral stenosis, left atrial enlargement   - ICD interrogation completed, no shocks recorded, ICD functioning battery life 2.5 years; Vpaced <1%  - Continue Lasix and Entresto   - No signs of acute volume overload. Tolerating Room air  - Strict I/Os, daily weights  - Monitor and replete Lytes. Keep K > 4 and Mg > 2  - Unclear why on sotalol, continue for now.  Would need outpatient records.  Follows with Dr. Manoj Perez in [facility]    HTN  - BP: 102/59 (09-25-21 @ 04:46) (96/63 - 110/60)  - continue sotalol with hold parameters  - continue entresto with hold parameters    - Monitor and replete lytes, keep K>4, Mg>2.  - All other medical needs as per primary team.  - Other cardiovascular workup will depend on clinical course.  - Will continue to follow.    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    Cellulitis of right lower leg and Acute OM of right hallux  - s/p right 1st partial ray resection.  Tolerated procedure well with no obvious cardiac complications  - Pain control  - MSSA bacteremia on admission BCx; repeat BCx from 9/16 are NGTD  - ID recommending CORBIN in the setting of TAVR and ICD.   -Discussed  with Dr. Quiles in [facility] re: ICD possible ICD extraction.    - Pt agreeable to transfer to [place] for transfer. awaiting bed  - Surgical pathology of right hallux: +acute Osteomyelitis, staph aureus   - Podiatry/wound care follow up  - Abx per ID.  Follow recs    Pulmonary embolism.   - Bilateral PE in 7/2020, on Xarelto   - stopped  Xarelto, on heparin  gtt pending CORBIN     Chronic systolic HF S/P ICD, Severe MS  - Echo not well visualized LV, severe Mitral stenosis, left atrial enlargement   - ICD interrogation completed, no shocks recorded, ICD functioning battery life 2.5 years; Vpaced <1%  - Continue Entresto   - cont lasix for now   - No signs of acute volume overload. Tolerating Room air  - Strict I/Os, daily weights  - Monitor and replete Lytes. Keep K > 4 and Mg > 2  - Unclear why on sotalol, continue for now.  Would need outpatient records.  Follows with Dr. Manoj Perez in [facility]    HTN  - BP: 102/59 (09-25-21 @ 04:46) (96/63 - 110/60)  - continue sotalol with hold parameters  - continue entresto with hold parameters  - if bp remains soft hold lasix temporarily     - Monitor and replete lytes, keep K>4, Mg>2.  - All other medical needs as per primary team.  - Other cardiovascular workup will depend on clinical course.  - Will continue to follow.    Asha Padilla, MS ANP, AGACNP  Nurse Practitioner- Cardiology   Spectra #[phone]

## 2021-09-25 NOTE — PROGRESS NOTE ADULT - SUBJECTIVE AND OBJECTIVE BOX
80y year old Male seen at [facility] 3WES 355 D1 for s/p Right 1st partial ray resection secondary to acute OM with Dr. López, DOS 9/2021. The patient dressing is clean, dry and intact. The patient states that he has been compliant with non weight bearing to the right lower extremity and having the right lower extremity elevated with the pillow.  . Denies any fever, chills, nausea, vomiting, chest pain, shortness of breath, or calf pain at this time.    Allergies    No Known Allergies    Intolerances        MEDICATIONS  (STANDING):  ceFAZolin   IVPB 2000 milliGRAM(s) IV Intermittent every 8 hours  cholecalciferol 400 Unit(s) Oral daily  dorzolamide 2%/timolol 0.5% Ophthalmic Solution 1 Drop(s) Both EYES two times a day  DULoxetine 60 milliGRAM(s) Oral daily  furosemide    Tablet 20 milliGRAM(s) Oral daily  gabapentin 300 milliGRAM(s) Oral two times a day  heparin  Infusion.  Unit(s)/Hr (18 mL/Hr) IV Continuous <Continuous>  ketorolac 0.5% Ophthalmic Solution 1 Drop(s) Both EYES daily  latanoprost 0.005% Ophthalmic Solution 1 Drop(s) Both EYES at bedtime  melatonin 5 milliGRAM(s) Oral at bedtime  multivitamin 1 Tablet(s) Oral daily  pancrelipase  (CREON 36,000 Lipase Units) 2 Capsule(s) Oral three times a day with meals  pantoprazole    Tablet 40 milliGRAM(s) Oral before breakfast  pyridoxine 100 milliGRAM(s) Oral daily  sacubitril 49 mG/valsartan 51 mG 1 Tablet(s) Oral two times a day  sotalol 40 milliGRAM(s) Oral two times a day  tamsulosin 0.4 milliGRAM(s) Oral at bedtime    MEDICATIONS  (PRN):  acetaminophen   Tablet .. 650 milliGRAM(s) Oral every 6 hours PRN Temp greater or equal to 38C (100.4F), Mild Pain (1 - 3)  ALPRAZolam 0.25 milliGRAM(s) Oral at bedtime PRN anxiety  heparin   Injectable 8000 Unit(s) IV Push every 6 hours PRN For aPTT less than 40  heparin   Injectable 4000 Unit(s) IV Push every 6 hours PRN For aPTT between 40 - 57  oxycodone    5 mG/acetaminophen 325 mG 1 Tablet(s) Oral every 6 hours PRN Moderate Pain (4 - 6)  prochlorperazine   Tablet 10 milliGRAM(s) Oral daily PRN nausea or vomiting    Vital Signs Last 24 Hrs  T(C): 36.4 (25 Sep 2021 04:46), Max: 36.6 (24 Sep 2021 21:15)  T(F): 97.5 (25 Sep 2021 04:46), Max: 97.9 (24 Sep 2021 21:15)  HR: 81 (25 Sep 2021 04:46) (81 - 88)  BP: 102/59 (25 Sep 2021 04:46) (96/63 - 110/60)  BP(mean): --  RR: 17 (25 Sep 2021 04:46) (17 - 17)  SpO2: 94% (25 Sep 2021 04:46) (94% - 97%)        PHYSICAL EXAM:  Vascular: DP and PT palpable, diffuse edema and residual erythema along the right medial forefoot, no digital hair present, skin temperature within normal limits   Neurological: Loss of protective sensation b/l  Musculoskeletal: 5/5 muscle strength in all 4 quadrants b/l, no pain upon palpation along the surgical site, s/p Right 1st partial ray resection   Dermatological:  1. S/p Right 1st partial ray resection- primarily closed proximally and distally, wound dehscience centrally secondary to hematoma(which was removed ) size 2.5cmx0.3cmx0.3cm- serous sanguineous drainage, more sanguineous than serous,  diffuse edema and erythema, sutures intact, skin well approximated no malodor, no streaking, no signs of infection     CBC Full  -  ( 25 Sep 2021 08:46 )  WBC Count : 3.39 K/uL  RBC Count : 2.63 M/uL  Hemoglobin : 8.8 g/dL  Hematocrit : 27.9 %  Platelet Count - Automated : 286 K/uL  Mean Cell Volume : 106.1 fl  Mean Cell Hemoglobin : 33.5 pg  Mean Cell Hemoglobin Concentration : 31.5 gm/dL  Auto Neutrophil # : 1.91 K/uL  Auto Lymphocyte # : 0.72 K/uL  Auto Monocyte # : 0.67 K/uL  Auto Eosinophil # : 0.04 K/uL  Auto Basophil # : 0.03 K/uL  Auto Neutrophil % : 56.3 %  Auto Lymphocyte % : 21.2 %  Auto Monocyte % : 19.8 %  Auto Eosinophil % : 1.2 %  Auto Basophil % : 0.9 %      ----------CHEM PANEL----------    09-25    138  |  105  |  12  ----------------------------<  91  4.8   |  30  |  0.77    Ca    8.8      25 Sep 2021 08:46  Mg     2.1     09-24              Culture - Tissue with Gram Stain (collected 21 Sep 2021 01:30)  Source: .Tissue Right hallux bone culutre  Gram Stain (21 Sep 2021 06:11):    Rare polymorphonuclear leukocytes seen per low power field    No organisms seen per oil power field    Culture - Tissue with Gram Stain (collected 21 Sep 2021 01:29)  Source: .Tissue right first metatarsal bone cu  Gram Stain (21 Sep 2021 06:11):    Rare polymorphonuclear leukocytes seen per low power field    No organisms seen per oil power field        Imaging: ----------

## 2021-09-25 NOTE — PROGRESS NOTE ADULT - PROBLEM SELECTOR PROBLEM 1
Toe infection
Toe osteomyelitis
Toe osteomyelitis
Toe infection
Toe osteomyelitis
Toe infection
Cellulitis of right lower leg

## 2021-09-25 NOTE — PROGRESS NOTE ADULT - PROBLEM SELECTOR PLAN 1
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 López, DOS 9/2021- primarily closed   - patient seen and evaluated  - post op complication of hematoma, remove 4 sutures centrally and removed the hematoma per Dr. Feilpe on 9/23/2021  - dressing taken off with care and to patient's tolerance  - area copiously irrigated with bulb syringe with 2L normal saline and area pat dry with sterile gauze   - Area dressed with aquacel ag and DSD secured with ACE bandage   - patient states that after removal, area had relief of pressure and pain   - Follow up with the OR pathologies and cultures  - Follow up with infectious disease recommendations  - Wound Care Instructions below   - Podiatry team will continue to follow patient while in house  WOUND CARE INSTRUCTIONS   1. Cleanse wound with sterile saline solution and gently pat dry.  2. Dress wound with Aquacel ag and dry, sterile dressing.  3. Change dressings daily and monitor for any signs of infection.  4. Patient is to follow up [facility] with Dr. Felipe or Dr. López within 1 week upon discharge.

## 2021-09-25 NOTE — PROGRESS NOTE ADULT - SUBJECTIVE AND OBJECTIVE BOX
[facility]  INFECTIOUS DISEASES   [address] [place] [address]  Tel: [phone]     Fax: [phone]  =======================================================    MRN-[id number]  JUANIS DE SANTIAGO     Follow up: right hallux cellulitis and bacteremia     No fever.   Had bone biopsy on 9/15, showing acute osteomyelitis.   No more bleeding in foot.     PAST MEDICAL & SURGICAL HISTORY:  HTN (hypertension)  HLD (hyperlipidemia)  GERD (gastroesophageal reflux disease)  Cardiomyopathy  History of total hip replacement left  History of laminectomy  ICD (implantable cardiac defibrillator) in place  Benign testicular tumor  History of hip replacement    Social Hx: no smoking, ETOH or drugs     FAMILY HISTORY:  Family history of stroke (Mother)    Allergies  No Known Allergies    Antibiotics:  cefazolin      REVIEW OF SYSTEMS:  CONSTITUTIONAL:  No Fever or chills  HEENT:  No diplopia or blurred vision.  No sore throat or runny nose.  CARDIOVASCULAR:  No chest pain or SOB.  RESPIRATORY:  No cough, shortness of breath, PND or orthopnea.  GASTROINTESTINAL:  No nausea, vomiting or diarrhea.  GENITOURINARY:  No dysuria, frequency or urgency. No Blood in urine  MUSCULOSKELETAL: foot ulcer   SKIN:  No change in skin, hair or nails.  NEUROLOGIC:  No paresthesias, fasciculations, seizures or weakness.  PSYCHIATRIC:  No disorder of thought or mood.  ENDOCRINE:  No heat or cold intolerance, polyuria or polydipsia.  HEMATOLOGICAL:  No easy bruising or bleeding.     Physical Exam:  Vital Signs Last 24 Hrs  T(C): 36.4 (25 Sep 2021 04:46), Max: 36.6 (24 Sep 2021 21:15)  T(F): 97.5 (25 Sep 2021 04:46), Max: 97.9 (24 Sep 2021 21:15)  HR: 81 (25 Sep 2021 04:46) (81 - 89)  BP: 102/59 (25 Sep 2021 04:46) (96/63 - 110/60)  BP(mean): --  RR: 17 (25 Sep 2021 04:46) (17 - 17)  SpO2: 94% (25 Sep 2021 04:46) (94% - 97%)  GEN: NAD  HEENT: normocephalic and atraumatic. EOMI. PERRL.    NECK: Supple.  No lymphadenopathy   LUNGS: Clear to auscultation.  HEART: Regular rate and rhythm without murmur.  ABDOMEN: Soft, nontender, and nondistended.  Positive bowel sounds.    : No CVA tenderness  EXTREMITIES: R foot s/p amputation of hallux, wound open in the middle, looks clean, no bleeding  NEUROLOGIC: grossly intact.  PSYCHIATRIC: Appropriate affect .  SKIN: No ulceration or induration present.    Labs:                        8.8    3.39  )-----------( 286      ( 25 Sep 2021 08:46 )             27.9     09-25    138  |  105  |  12  ----------------------------<  91  4.8   |  30  |  0.77    Ca    8.8      25 Sep 2021 08:46  Mg     2.1     09-24    Culture - Tissue with Gram Stain (collected 09-21-21 @ 01:30)  Source: .Tissue Right hallux bone culutre  Gram Stain (09-21-21 @ 06:11):    Rare polymorphonuclear leukocytes seen per low power field    No organisms seen per oil power field  Organism: Pseudomonas aeruginosa  Staphylococcus pettenkoferi (09-24-21 @ 21:53)  Organism: Staphylococcus pettenkoferi (09-24-21 @ 21:53)    Sensitivities:      -  Ampicillin/Sulbactam: R <=8/4      -  Cefazolin: R >16      -  Clindamycin: R >4      -  Erythromycin: R >4      -  Gentamicin: S <=1 Should not be used as monotherapy      -  Oxacillin: R >2      -  Penicillin: R >8      -  RIF- Rifampin: S <=1 Should not be used as monotherapy      -  Tetra/Doxy: R >8      -  Trimethoprim/Sulfamethoxazole: R >2/38      -  Vancomycin: S 2      Method Type: TYSON  Organism: Pseudomonas aeruginosa (09-24-21 @ 21:53)    Sensitivities:      -  Amikacin: S <=16      -  Aztreonam: S 8      -  Cefepime: S 4      -  Ceftazidime: S 4      -  Ciprofloxacin: S <=0.25      -  Gentamicin: S 4      -  Imipenem: S 2      -  Levofloxacin: S <=0.5      -  Meropenem: S <=1      -  Piperacillin/Tazobactam: S <=8      -  Tobramycin: S <=2      Method Type: TYSON    Culture - Tissue with Gram Stain (collected 09-21-21 @ 01:29)  Source: .Tissue right first metatarsal bone cu  Gram Stain (09-21-21 @ 06:11):    Rare polymorphonuclear leukocytes seen per low power field    No organisms seen per oil power field  Organism: Pseudomonas aeruginosa  Staphylococcus pettenkoferi (09-24-21 @ 21:53)  Organism: Staphylococcus pettenkoferi (09-24-21 @ 21:53)    Sensitivities:      -  Ampicillin/Sulbactam: S <=8/4      -  Cefazolin: S <=4      -  Clindamycin: R 0.5 This isolate is presumed to be clindamycin resistant based on detection of inducible resistance. Clindamycin may still be effective in some patients.      -  Erythromycin: R >4      -  Gentamicin: S <=1 Should not be used as monotherapy      -  Oxacillin: S <=0.25      -  RIF- Rifampin: S <=1 Should not be used as monotherapy      -  Tetra/Doxy: R >8      -  Trimethoprim/Sulfamethoxazole: S <=0.5/9.5      -  Vancomycin: S 2      Method Type: TYSON  Organism: Pseudomonas aeruginosa (09-23-21 @ 15:36)    Sensitivities:      -  Amikacin: S <=16      -  Aztreonam: S 8      -  Cefepime: S <=2      -  Ceftazidime: S 4      -  Ciprofloxacin: S <=0.25      -  Gentamicin: S 4      -  Imipenem: S 2      -  Levofloxacin: S 1      -  Meropenem: S <=1      -  Piperacillin/Tazobactam: S <=8      -  Tobramycin: S <=2      Method Type: TYSON    Culture - Blood (collected 09-16-21 @ 12:01)  Source: .Blood Blood-Peripheral  Final Report (09-21-21 @ 13:00):    No Growth Final    Culture - Blood (collected 09-16-21 @ 12:01)  Source: .Blood Blood-Peripheral  Final Report (09-21-21 @ 13:00):    No Growth Final    Culture - Tissue with Gram Stain (collected 09-16-21 @ 01:16)  Source: .Tissue right hallux bone culture  Gram Stain (09-16-21 @ 04:48):    Rare polymorphonuclear leukocytes seen per low power field    No organisms seen per oil power field  Final Report (09-21-21 @ 10:11):    Few Staphylococcus aureus  Organism: Staphylococcus aureus (09-21-21 @ 10:11)  Organism: Staphylococcus aureus (09-21-21 @ 10:11)    Sensitivities:      -  Ampicillin/Sulbactam: S <=8/4      -  Cefazolin: S <=4      -  Clindamycin: S <=0.25      -  Erythromycin: S <=0.25      -  Gentamicin: S 2 Should not be used as monotherapy      -  Oxacillin: S <=0.25      -  RIF- Rifampin: S <=1 Should not be used as monotherapy      -  Tetra/Doxy: S <=1      -  Trimethoprim/Sulfamethoxazole: S <=0.5/9.5      -  Vancomycin: S 2      Method Type: TYSON    Culture - Urine (collected 09-15-21 @ 04:15)  Source: Clean Catch Clean Catch (Midstream)  Final Report (09-16-21 @ 00:06):    <10,000 CFU/mL Normal Urogenital Juani    Culture - Blood (collected 09-15-21 @ 01:20)  Source: .Blood Blood-Peripheral  Gram Stain (09-16-21 @ 01:09):    Growth in aerobic bottle: Gram Positive Cocci in Clusters    Growth in anaerobic bottle: Gram Positive Cocci in Clusters  Final Report (09-17-21 @ 16:59):    Growth in aerobic and anaerobic bottles: Staphylococcus aureus    ***Blood Panel PCR results on this specimen are available    approximately 3 hours after the Gram stain result.***    Gram stain, PCR, and/or culture results may not always    correspond dueto difference in methodologies.    ************************************************************    This PCR assay was performed by multiplex PCR. This    Assay tests for 66 bacterial and resistance gene targets.    Please refer to the [facility] test directory    at [email or web address] for details.  Organism: Blood Culture PCR  Staphylococcus aureus (09-17-21 @ 16:59)  Organism: Staphylococcus aureus (09-17-21 @ 16:59)    Sensitivities:      -  Ampicillin/Sulbactam: S <=8/4      -  Cefazolin: S <=4      -  Clindamycin: R <=0.25 This isolate is presumed to be clindamycin resistant based on detection of inducible resistance. Clindamycin may still be effective in some patients.      -  Erythromycin: I 1      -  Gentamicin: S <=1 Should not be used as monotherapy      -  Oxacillin: S 0.5      -  RIF- Rifampin: S <=1 Should not be used as monotherapy      -  Tetra/Doxy: S <=1      -  Trimethoprim/Sulfamethoxazole: S <=0.5/9.5      -  Vancomycin: S 2      Method Type: TYSON  Organism: Blood Culture PCR (09-17-21 @ 16:59)    Sensitivities:      -  Staphylococcus aureus: Detec Any isolate of Staphylococcus aureus from a blood culture is NOT considered a contaminant.      Method Type: PCR    Culture - Blood (collected 09-15-21 @ 01:20)  Source: .Blood Blood-Peripheral  Gram Stain (09-15-21 @ 22:34):    Growth in aerobic bottle: Gram Positive Cocci in Clusters  Final Report (09-17-21 @ 17:41):    Growth in aerobic bottle: Staphylococcus aureus    See previous culture [id number]    WBC Count: 3.39 K/uL (09-25-21 @ 08:46)  WBC Count: 4.22 K/uL (09-24-21 @ 09:14)  WBC Count: 7.06 K/uL (09-23-21 @ 12:27)  WBC Count: 5.15 K/uL (09-23-21 @ 08:25)  WBC Count: 4.04 K/uL (09-22-21 @ 07:31)  WBC Count: 4.44 K/uL (09-21-21 @ 07:19)    Creatinine, Serum: 0.77 mg/dL (09-25-21 @ 08:46)  Creatinine, Serum: 0.76 mg/dL (09-24-21 @ 09:14)  Creatinine, Serum: 0.62 mg/dL (09-23-21 @ 08:25)  Creatinine, Serum: 0.84 mg/dL (09-22-21 @ 07:31)  Creatinine, Serum: 0.69 mg/dL (09-21-21 @ 18:33)    C-Reactive Protein, Serum: 168 mg/L (09-15-21 @ 07:50)    Ferritin, Serum: 585 ng/mL (09-15-21 @ 11:30)    COVID-19 PCR: NotDetec (09-23-21 @ 08:33)  COVID-19 PCR: NotDetec (09-20-21 @ 12:59)  COVID-19 PCR: NotDetec (09-14-21 @ 20:17)    All imaging and other data have been reviewed.  < from: Xray Tibia + Fibula 2 Views, Right (09.14.21 @ 20:54) >  IMPRESSION: No acute chest finding. Ankle edema and degenerative changes in the foot again seen. Fairly advanced right knee degeneration also again noted.    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 Patient states he was advised to go the wound care center by his oncologist at [place] due to increased redness and edema to right lower leg.     ESR 53  Xray with degenerative changes   s/p debridement and bone biopsy on 9/15 with acute OM  Tissue culture before surgery MSSA  Blood culture on 9/15 MSSA  OR cutlure CoNS and pseudomonas, I believe they are contamination, even though pseudomonas is rarely contaminant but since blood culture and  tissue culture before surgery grew MSSA, will treat MSSA for 6 weeks.     R lower leg cellulitis, R hallux wound    Recommendations:   - Blood culture with MSSA on 9/15  - Blood culture NGTD on 9/16  - Wound culture MSSA  - S/P R 1st toe amputation on 9/20, culture with CoNS and pseudomonas I don't believe they are real pathogens for this case   - Continue cefazolin 2gm q8h  - TTE negative, CORBIN due to history of TAVR is indicated (also had ICD).   - Even with clean amputation margines will treat for 6 weeks unless CORBIN done and is negative.  - Would hold on Rifampin for now   - PICC in place.    - last day would be 10/27 for now    Will follow.    Jaren Bell MD  Division of Infectious Diseases   Cell [phone] between 8am and 6pm   After 6pm and weekends please call ID service at [phone].

## 2021-09-25 NOTE — PROGRESS NOTE ADULT - SUBJECTIVE AND OBJECTIVE BOX
[facility] -- Milo Thomas, Binta Mata, Arian Rand Savella, Goodger: Office # [phone]    Follow Up:  Cardiac Optimization, Hx of ischemic CMY s/p ICD, severe AS s/p TAVR    Subjective/Observations:  Patient seen and examined, awake, alert, eating breakfast in bed, no complaints of chest pain, dyspnea, palpitations or dizziness, orthopnea and PND. Tolerating room air.       REVIEW OF SYSTEMS: All review of systems is negative for eye, ENT, GI, , allergic, dermatologic, musculoskeletal and neurologic except as described above    PAST MEDICAL & SURGICAL HISTORY:  HTN (hypertension)    HLD (hyperlipidemia)    GERD (gastroesophageal reflux disease)    Cardiomyopathy    History of total hip replacement  left    History of laminectomy    ICD (implantable cardiac defibrillator) in place    Benign testicular tumor    History of hip replacement        MEDICATIONS  (STANDING):  ceFAZolin   IVPB 2000 milliGRAM(s) IV Intermittent every 8 hours  chlorhexidine 4% Liquid 1 Application(s) Topical <User Schedule>  cholecalciferol 400 Unit(s) Oral daily  dorzolamide 2%/timolol 0.5% Ophthalmic Solution 1 Drop(s) Both EYES two times a day  DULoxetine 60 milliGRAM(s) Oral daily  furosemide    Tablet 20 milliGRAM(s) Oral daily  gabapentin 300 milliGRAM(s) Oral two times a day  heparin  Infusion.  Unit(s)/Hr (18 mL/Hr) IV Continuous <Continuous>  ketorolac 0.5% Ophthalmic Solution 1 Drop(s) Both EYES daily  latanoprost 0.005% Ophthalmic Solution 1 Drop(s) Both EYES at bedtime  lidocaine   4% Patch 1 Patch Transdermal daily  melatonin 5 milliGRAM(s) Oral at bedtime  multivitamin 1 Tablet(s) Oral daily  pancrelipase  (CREON 36,000 Lipase Units) 2 Capsule(s) Oral three times a day with meals  pantoprazole    Tablet 40 milliGRAM(s) Oral before breakfast  pyridoxine 100 milliGRAM(s) Oral daily  sacubitril 49 mG/valsartan 51 mG 1 Tablet(s) Oral two times a day  sotalol 40 milliGRAM(s) Oral two times a day  tamsulosin 0.4 milliGRAM(s) Oral at bedtime    MEDICATIONS  (PRN):  acetaminophen   Tablet .. 650 milliGRAM(s) Oral every 6 hours PRN Temp greater or equal to 38C (100.4F), Mild Pain (1 - 3)  ALPRAZolam 0.25 milliGRAM(s) Oral at bedtime PRN anxiety  heparin   Injectable 8000 Unit(s) IV Push every 6 hours PRN For aPTT less than 40  heparin   Injectable 4000 Unit(s) IV Push every 6 hours PRN For aPTT between 40 - 57  oxycodone    5 mG/acetaminophen 325 mG 1 Tablet(s) Oral every 6 hours PRN Moderate Pain (4 - 6)  prochlorperazine   Tablet 10 milliGRAM(s) Oral daily PRN nausea or vomiting  sodium chloride 0.9% lock flush 10 milliLiter(s) IV Push every 1 hour PRN Pre/post blood products, medications, blood draw, and to maintain line patency    Allergies    No Known Allergies    Intolerances      Vital Signs Last 24 Hrs  T(C): 36.4 (25 Sep 2021 04:46), Max: 36.6 (24 Sep 2021 21:15)  T(F): 97.5 (25 Sep 2021 04:46), Max: 97.9 (24 Sep 2021 21:15)  HR: 81 (25 Sep 2021 04:46) (81 - 89)  BP: 102/59 (25 Sep 2021 04:46) (96/63 - 110/60)  BP(mean): --  RR: 17 (25 Sep 2021 04:46) (17 - 17)  SpO2: 94% (25 Sep 2021 04:46) (94% - 97%)  I&O's Summary    24 Sep 2021 07:01  -  25 Sep 2021 07:00  --------------------------------------------------------  IN: 50 mL / OUT: 600 mL / NET: -550 mL          TELE: Not on telemetry   PHYSICAL EXAM:  Appearance: NAD, no distress, alert, Well developed   HEENT: Moist Mucous Membranes, Anicteric  Cardiovascular: Regular rate and rhythm, Normal S1 S2, No JVD, No murmurs, No rubs, gallops or clicks  Respiratory: Non-labored, Clear to auscultation, No rales, No rhonchi, No wheezing.   Gastrointestinal:  Soft, Non-tender, + BS  Neurologic: Non-focal  Skin: Warm and dry, No visible rashes or ulcers, No ecchymosis, No cyanosis  Musculoskeletal: No clubbing, No cyanosis, No joint swelling/tenderness  Psychiatry: Mood & affect appropriate  Lymph: No peripheral edema.     LABS: All Labs Reviewed:                        8.8    3.39  )-----------( 286      ( 25 Sep 2021 08:46 )             27.9                         8.6    4.22  )-----------( 257      ( 24 Sep 2021 09:14 )             28.2                         9.2    7.06  )-----------( 267      ( 23 Sep 2021 12:27 )             29.7     25 Sep 2021 08:46    138    |  105    |  12     ----------------------------<  91     4.8     |  30     |  0.77   24 Sep 2021 09:14    139    |  103    |  10     ----------------------------<  121    4.7     |  30     |  0.76   23 Sep 2021 08:25    139    |  105    |  8      ----------------------------<  94     4.4     |  30     |  0.62     Ca    8.8        25 Sep 2021 08:46  Ca    9.0        24 Sep 2021 09:14  Ca    8.4        23 Sep 2021 08:25  Mg     2.1       24 Sep 2021 09:14    TPro  7.2    /  Alb  2.7    /  TBili  0.4    /  DBili  x      /  AST  19     /  ALT  12     /  AlkPhos  113    23 Sep 2021 08:25    PT/INR - ( 25 Sep 2021 08:46 )   PT: 13.9 sec;   INR: 1.20 ratio         PTT - ( 25 Sep 2021 08:46 )  PTT:35.1 sec      12 Lead ECG:   Ventricular Rate 97 BPM  Atrial Rate 97 BPM  P-R Interval 130 ms  QRS Duration 122 ms  Q-T Interval 374 ms  QTC Calculation(Bazett) 474 m  P Axis 43 degrees  R Axis 35 degrees  T Axis 173 degrees  Diagnosis Line Normal sinus rhythm  Left bundle branch block  Confirmed by JAI RAND ([id number]) on 9/15/2021 10:39:54 AM (09-14-21 @ 20:29)    ra< from: Xray Foot AP + Lateral + Oblique, Right (09.20.21 @ 16:37) >  EXAM:  XR FOOT COMP MIN 3 VIEWS RT                        PROCEDURE DATE:  09/20/2021    INTERPRETATION:  RIGHT foot  CLINICAL INFORMATION: Postoperative radiographs.  TECHNIQUE: AP,lateral and oblique views.  FINDINGS:  Status post transmetatarsal amputation of the first hallux. Remaining osseous joint structures intact..  IMPRESSION:  Post transmetatarsal amputation first hallux.  --- End of Report ---  JODI ANDREWS MD; Attending Radiologist  This document has been electronically signed. Sep 23 2021  8:12AM    < end of copied text >  < from: TTE Echo Complete w/o Contrast w/ Doppler (09.18.21 @ 08:10) >   EXAM:  ECHO TTE WO CON COMP W DOPP    PROCEDURE DATE:  09/18/2021    INTERPRETATION:  INDICATION: Infectious endocarditis  Sonographer KL  Blood Pressure 108/71    Height 185.4 cm     Weight 97.5 kg       BSA 2.22 sq m  Dimensions:  LA 4.3       Normal Values: 2.0 - 4.0 cm  Ao 3.5        Normal Values: 2.0 - 3.8 cm  SEPTUM 1.3       Normal Values: 0.6 - 1.2 cm  PWT 1.2       Normal Values: 0.6 - 1.1 cm  LVIDd 4.9         Normal Values: 3.0 - 5.6 cm  LVIDs 4.3         Normal Values: 1.8 - 4.0 cm    OBSERVATIONS:  Technically difficult and very limited study  Mitral Valve: Mitral annular calcification and calcified leaflets with restricted opening. Mean transmitral valve gradient equals 10.6 mmHg which is consistent with severe mitral stenosis.  Mild MR.  Aortic Valve/Aorta: Patient has a history of bioprosthetic aortic valve replacement. Peak transaortic valve gradient 21 mmHg with a mean transaortic valve gradient of 11 mmHg. This is probably normal in the setting of a prosthetic valve  Tricuspid Valve: Not well-visualized  Pulmonic Valve: Not well-visualized  Left Atrium: Enlarged  Right Atrium: Not well-visualized  Left Ventricle: The left ventricular endocardium is not well-visualized. Unable to accurately assess left ventricular function. If clinically indicated can consider further imaging with echo contrast  Right Ventricle: The right ventricle is not well-visualized. A device wire is noted within the right heart  Pericardium: no significant pericardial effusion.  Pulmonary/RV Pressure: estimated PA systolic pressure of 39 mmHg    IMPRESSION:  Technically difficult and very limited study  The left ventricular endocardium is not well-visualized. Unable to accurately assess left ventricular function. If clinically indicated can consider further imaging with echo contrast  The right ventricle is not well-visualized. A device wire is noted within the right heart  Patient has a history of bioprosthetic aortic valve replacement. Peak transaortic valvegradient 21 mmHg with a mean transaortic valve gradient of 11 mmHg. This is probably normal in the setting of a prosthetic valve  Calcified mitral valve with restricted opening. Valve gradients are consistent with severe mitral stenosis  Mild MR  Left atrial enlargement  --- End of Report ---    TASHA RENDON MD; Attending Cardiologist  This document has been electronically signed. Sep 18 2021  8:43P  < end of copied text >

## 2021-09-25 NOTE — PROGRESS NOTE ADULT - SUBJECTIVE AND OBJECTIVE BOX
Patient is a 80y old  Male who presents with a chief complaint of right hallux ulcer/cellulitis     ----  INTERVAL HPI/OVERNIGHT EVENTS: Patient seen and evaluated at the bedside. No acute overnight events occurred. Patient has no acute complaints today. Denies fevers, chills, HA, SOB, chest pain, abdominal pain, n/v/d/c.    ----  PAST MEDICAL & SURGICAL HISTORY:  HTN (hypertension)  HLD (hyperlipidemia)  GERD (gastroesophageal reflux disease)  Cardiomyopathy  History of total hip replacement  left  History of laminectomy  ICD (implantable cardiac defibrillator) in place  Benign testicular tumor  History of hip replacement    FAMILY HISTORY:  Family history of stroke (Mother)    Home Medications:  acetaminophen 325 mg oral tablet: 2 tab(s) orally every 6 hours, As needed, For Temp over 37.9 C (100.2 F) (20 Sep 2021 14:55)  ALPRAZolam 0.25 mg oral tablet: 1 tab(s) orally 3 times a day, As Needed (20 Sep 2021 14:55)  ceFAZolin: 2000 milligram(s) intravenous every 8 hours (24 Sep 2021 14:38)  cholecalciferol 400 intl units (10 mcg) oral tablet: 1 tab(s) orally once a day (20 Sep 2021 14:55)  Coenzyme Q10 200 mg oral capsule: 1 tab(s) orally once a day (20 Sep 2021 14:55)  dorzolamide-timolol 2.23%-0.68% ophthalmic solution: 1 drop(s) to each affected eye 2 times a day (20 Sep 2021 14:55)  DULoxetine 60 mg oral delayed release capsule: 1 cap(s) orally once a day (20 Sep 2021 14:55)  Entresto 49 mg-51 mg oral tablet: 1 tab(s) orally 2 times a day (20 Sep 2021 14:55)  furosemide 20 mg oral tablet: 1 tab(s) orally once a day (20 Sep 2021 14:55)  gabapentin 300 mg oral tablet: 1 tab(s) orally 2 times a day (20 Sep 2021 14:55)  ketorolac 0.5% ophthalmic solution: 1 drop(s) to each affected eye once a day (20 Sep 2021 14:55)  latanoprost 0.005% ophthalmic solution: 1 drop(s) to each affected eye once a day (in the evening) (20 Sep 2021 14:55)  lidocaine-prilocaine 2.5%-2.5% topical cream: Apply topically to affected area once on day of treament for 1 dose (20 Sep 2021 14:55)  Multiple Vitamins oral tablet: 1 tab(s) orally once a day (20 Sep 2021 14:55)  omeprazole 20 mg oral delayed release tablet: 1 tab(s) orally once a day (20 Sep 2021 14:55)  oxycodone-acetaminophen 5 mg-325 mg oral tablet: 1 tab(s) orally every 6 hours, As needed, Moderate Pain (4 - 6) (24 Sep 2021 14:38)  pancrelipase 36,000 units-114,000 units-180,000 units oral delayed release capsule: 2 cap(s) orally 3 times a day (20 Sep 2021 14:55)  prochlorperazine 10 mg oral capsule, extended release:  (20 Sep 2021 14:55)  sotalol 80 mg oral tablet: 0.5 tab(s) orally 2 times a day (20 Sep 2021 14:55)  tamsulosin 0.4 mg oral capsule: 1 cap(s) orally once a day (20 Sep 2021 14:55)  Vitamin B6 100 mg oral tablet: 1 tab(s) orally once a day (20 Sep 2021 14:55)  Xarelto 20 mg oral tablet: 1 tab(s) orally once a day (in the evening).  Held for transfer.  Possible ICD extraction.  (24 Sep 2021 14:38)      Allergies  No Known Allergies    ----  MEDICATIONS  (STANDING):  ceFAZolin   IVPB 2000 milliGRAM(s) IV Intermittent every 8 hours  chlorhexidine 4% Liquid 1 Application(s) Topical <User Schedule>  cholecalciferol 400 Unit(s) Oral daily  dorzolamide 2%/timolol 0.5% Ophthalmic Solution 1 Drop(s) Both EYES two times a day  DULoxetine 60 milliGRAM(s) Oral daily  furosemide    Tablet 20 milliGRAM(s) Oral daily  gabapentin 300 milliGRAM(s) Oral two times a day  heparin  Infusion.  Unit(s)/Hr (18 mL/Hr) IV Continuous <Continuous>  ketorolac 0.5% Ophthalmic Solution 1 Drop(s) Both EYES daily  latanoprost 0.005% Ophthalmic Solution 1 Drop(s) Both EYES at bedtime  lidocaine   4% Patch 1 Patch Transdermal daily  melatonin 5 milliGRAM(s) Oral at bedtime  multivitamin 1 Tablet(s) Oral daily  pancrelipase  (CREON 36,000 Lipase Units) 2 Capsule(s) Oral three times a day with meals  pantoprazole    Tablet 40 milliGRAM(s) Oral before breakfast  pyridoxine 100 milliGRAM(s) Oral daily  sacubitril 49 mG/valsartan 51 mG 1 Tablet(s) Oral two times a day  sotalol 40 milliGRAM(s) Oral two times a day  tamsulosin 0.4 milliGRAM(s) Oral at bedtime    MEDICATIONS  (PRN):  acetaminophen   Tablet .. 650 milliGRAM(s) Oral every 6 hours PRN Temp greater or equal to 38C (100.4F), Mild Pain (1 - 3)  ALPRAZolam 0.25 milliGRAM(s) Oral at bedtime PRN anxiety  heparin   Injectable 8000 Unit(s) IV Push every 6 hours PRN For aPTT less than 40  heparin   Injectable 4000 Unit(s) IV Push every 6 hours PRN For aPTT between 40 - 57  oxycodone    5 mG/acetaminophen 325 mG 1 Tablet(s) Oral every 6 hours PRN Moderate Pain (4 - 6)  prochlorperazine   Tablet 10 milliGRAM(s) Oral daily PRN nausea or vomiting  sodium chloride 0.9% lock flush 10 milliLiter(s) IV Push every 1 hour PRN Pre/post blood products, medications, blood draw, and to maintain line patency    ----  REVIEW OF SYSTEMS:  CONSTITUTIONAL: denies fever, chills, fatigue, weakness  HEENT: denies blurred vision  SKIN: denies new lesions, rash  CARDIOVASCULAR: denies chest pain, chest pressure, palpitations  RESPIRATORY: denies shortness of breath, sputum production  GASTROINTESTINAL: denies nausea, vomiting, diarrhea, abdominal pain  GENITOURINARY: denies dysuria, discharge  NEUROLOGICAL: denies numbness, headache, focal weakness  MUSCULOSKELETAL: denies new joint pain, muscle aches  HEMATOLOGIC: denies gross bleeding, bruising  LYMPHATICS: denies enlarged lymph nodes, extremity swelling  PSYCHIATRIC: denies recent changes in anxiety, depression  ENDOCRINOLOGIC: denies sweating, cold or heat intolerance    ----  PHYSICAL EXAM:  GENERAL: patient appears well, no acute distress, appropriately interactive  EYES: sclera clear, no exudates  ENMT: oropharynx clear without erythema, moist mucous membranes  NECK: supple, soft, no thyromegaly noted  LUNGS: good air entry bilaterally, clear to auscultation, symmetric breath sounds, no wheezing or rhonchi appreciated  HEART: soft S1/S2, regular rate and rhythm, +murmur RUSB LUSB, no noted edema to b/l LE  GASTROINTESTINAL: abdomen is soft, nontender, nondistended, normoactive bowel sounds, no palpable masses  INTEGUMENT: good skin turgor, appropriate for ethnicity, no visualized rashes, no jaundice noted  MUSCULOSKELETAL: no clubbing or cyanosis, no obvious deformity  NEUROLOGIC: awake, alert, oriented x3, good muscle tone in 4 extremities, no obvious sensory deficits  PSYCHIATRIC: mood is good, affect is congruent with mood, linear and logical thought process  HEME/LYMPH: no palpable supraclavicular nodules, no obvious ecchymosis     T(C): 36.4 (09-25-21 @ 04:46), Max: 36.6 (09-24-21 @ 21:15)  HR: 81 (09-25-21 @ 04:46) (81 - 89)  BP: 102/59 (09-25-21 @ 04:46) (96/63 - 110/60)  RR: 17 (09-25-21 @ 04:46) (17 - 17)  SpO2: 94% (09-25-21 @ 04:46) (94% - 97%)  Wt(kg): --    ----  I&O's Summary    24 Sep 2021 07:01  -  25 Sep 2021 07:00  --------------------------------------------------------  IN: 50 mL / OUT: 600 mL / NET: -550 mL        LABS:                        8.8    3.39  )-----------( 286      ( 25 Sep 2021 08:46 )             27.9     09-25    138  |  105  |  12  ----------------------------<  91  4.8   |  30  |  0.77    Ca    8.8      25 Sep 2021 08:46  Mg     2.1     09-24      PT/INR - ( 25 Sep 2021 08:46 )   PT: 13.9 sec;   INR: 1.20 ratio         PTT - ( 25 Sep 2021 08:46 )  PTT:35.1 sec    ----  Personally reviewed:  Vital sign trends: [ x ] yes    [  ] no     [  ] n/a  Laboratory results: [ x ] yes    [  ] no     [  ] n/a  Radiology results: [  ] yes    [  ] no     [ x ] n/a - no new imaging   Culture results: [ x ] yes    [  ] no     [  ] n/a  Consultant recommendations: [ x ] yes    [  ] no     [  ] n/a Patient is a 80y old  Male who presents with a chief complaint of right hallux ulcer/cellulitis     ----  INTERVAL HPI/OVERNIGHT EVENTS: Patient seen and evaluated at the bedside. No acute overnight events occurred. Patient has no acute complaints today. Denies fevers, chills, HA, SOB, chest pain, abdominal pain, n/v/d/c.    ----  PAST MEDICAL & SURGICAL HISTORY:  HTN (hypertension)  HLD (hyperlipidemia)  GERD (gastroesophageal reflux disease)  Cardiomyopathy  History of total hip replacement  left  History of laminectomy  ICD (implantable cardiac defibrillator) in place  Benign testicular tumor  History of hip replacement    FAMILY HISTORY:  Family history of stroke (Mother)    Home Medications:  acetaminophen 325 mg oral tablet: 2 tab(s) orally every 6 hours, As needed, For Temp over 37.9 C (100.2 F) (20 Sep 2021 14:55)  ALPRAZolam 0.25 mg oral tablet: 1 tab(s) orally 3 times a day, As Needed (20 Sep 2021 14:55)  ceFAZolin: 2000 milligram(s) intravenous every 8 hours (24 Sep 2021 14:38)  cholecalciferol 400 intl units (10 mcg) oral tablet: 1 tab(s) orally once a day (20 Sep 2021 14:55)  Coenzyme Q10 200 mg oral capsule: 1 tab(s) orally once a day (20 Sep 2021 14:55)  dorzolamide-timolol 2.23%-0.68% ophthalmic solution: 1 drop(s) to each affected eye 2 times a day (20 Sep 2021 14:55)  DULoxetine 60 mg oral delayed release capsule: 1 cap(s) orally once a day (20 Sep 2021 14:55)  Entresto 49 mg-51 mg oral tablet: 1 tab(s) orally 2 times a day (20 Sep 2021 14:55)  furosemide 20 mg oral tablet: 1 tab(s) orally once a day (20 Sep 2021 14:55)  gabapentin 300 mg oral tablet: 1 tab(s) orally 2 times a day (20 Sep 2021 14:55)  ketorolac 0.5% ophthalmic solution: 1 drop(s) to each affected eye once a day (20 Sep 2021 14:55)  latanoprost 0.005% ophthalmic solution: 1 drop(s) to each affected eye once a day (in the evening) (20 Sep 2021 14:55)  lidocaine-prilocaine 2.5%-2.5% topical cream: Apply topically to affected area once on day of treament for 1 dose (20 Sep 2021 14:55)  Multiple Vitamins oral tablet: 1 tab(s) orally once a day (20 Sep 2021 14:55)  omeprazole 20 mg oral delayed release tablet: 1 tab(s) orally once a day (20 Sep 2021 14:55)  oxycodone-acetaminophen 5 mg-325 mg oral tablet: 1 tab(s) orally every 6 hours, As needed, Moderate Pain (4 - 6) (24 Sep 2021 14:38)  pancrelipase 36,000 units-114,000 units-180,000 units oral delayed release capsule: 2 cap(s) orally 3 times a day (20 Sep 2021 14:55)  prochlorperazine 10 mg oral capsule, extended release:  (20 Sep 2021 14:55)  sotalol 80 mg oral tablet: 0.5 tab(s) orally 2 times a day (20 Sep 2021 14:55)  tamsulosin 0.4 mg oral capsule: 1 cap(s) orally once a day (20 Sep 2021 14:55)  Vitamin B6 100 mg oral tablet: 1 tab(s) orally once a day (20 Sep 2021 14:55)  Xarelto 20 mg oral tablet: 1 tab(s) orally once a day (in the evening).  Held for transfer.  Possible ICD extraction.  (24 Sep 2021 14:38)      Allergies  No Known Allergies    ----  MEDICATIONS  (STANDING):  ceFAZolin   IVPB 2000 milliGRAM(s) IV Intermittent every 8 hours  chlorhexidine 4% Liquid 1 Application(s) Topical <User Schedule>  cholecalciferol 400 Unit(s) Oral daily  dorzolamide 2%/timolol 0.5% Ophthalmic Solution 1 Drop(s) Both EYES two times a day  DULoxetine 60 milliGRAM(s) Oral daily  furosemide    Tablet 20 milliGRAM(s) Oral daily  gabapentin 300 milliGRAM(s) Oral two times a day  heparin  Infusion.  Unit(s)/Hr (18 mL/Hr) IV Continuous <Continuous>  ketorolac 0.5% Ophthalmic Solution 1 Drop(s) Both EYES daily  latanoprost 0.005% Ophthalmic Solution 1 Drop(s) Both EYES at bedtime  lidocaine   4% Patch 1 Patch Transdermal daily  melatonin 5 milliGRAM(s) Oral at bedtime  multivitamin 1 Tablet(s) Oral daily  pancrelipase  (CREON 36,000 Lipase Units) 2 Capsule(s) Oral three times a day with meals  pantoprazole    Tablet 40 milliGRAM(s) Oral before breakfast  pyridoxine 100 milliGRAM(s) Oral daily  sacubitril 49 mG/valsartan 51 mG 1 Tablet(s) Oral two times a day  sotalol 40 milliGRAM(s) Oral two times a day  tamsulosin 0.4 milliGRAM(s) Oral at bedtime    MEDICATIONS  (PRN):  acetaminophen   Tablet .. 650 milliGRAM(s) Oral every 6 hours PRN Temp greater or equal to 38C (100.4F), Mild Pain (1 - 3)  ALPRAZolam 0.25 milliGRAM(s) Oral at bedtime PRN anxiety  heparin   Injectable 8000 Unit(s) IV Push every 6 hours PRN For aPTT less than 40  heparin   Injectable 4000 Unit(s) IV Push every 6 hours PRN For aPTT between 40 - 57  oxycodone    5 mG/acetaminophen 325 mG 1 Tablet(s) Oral every 6 hours PRN Moderate Pain (4 - 6)  prochlorperazine   Tablet 10 milliGRAM(s) Oral daily PRN nausea or vomiting  sodium chloride 0.9% lock flush 10 milliLiter(s) IV Push every 1 hour PRN Pre/post blood products, medications, blood draw, and to maintain line patency    ----  REVIEW OF SYSTEMS:  CONSTITUTIONAL: denies fever, chills, fatigue, weakness  HEENT: denies blurred vision  SKIN: denies new lesions, rash  CARDIOVASCULAR: denies chest pain, chest pressure, palpitations  RESPIRATORY: denies shortness of breath, sputum production  GASTROINTESTINAL: denies nausea, vomiting, diarrhea, abdominal pain  GENITOURINARY: denies dysuria, discharge  NEUROLOGICAL: denies numbness, headache, focal weakness  MUSCULOSKELETAL: denies new joint pain, muscle aches  HEMATOLOGIC: denies gross bleeding, bruising  LYMPHATICS: denies enlarged lymph nodes, extremity swelling  PSYCHIATRIC: denies recent changes in anxiety, depression  ENDOCRINOLOGIC: denies sweating, cold or heat intolerance    ----  PHYSICAL EXAM:  GENERAL: patient appears well, no acute distress, appropriately interactive  EYES: sclera clear, no exudates  ENMT: oropharynx clear without erythema, moist mucous membranes  NECK: supple, soft, no thyromegaly noted  LUNGS: good air entry bilaterally, clear to auscultation, symmetric breath sounds, no wheezing or rhonchi appreciated  HEART: soft S1/S2, regular rate and rhythm, +murmur RUSB LUSB, no noted edema to b/l LE  GASTROINTESTINAL: abdomen is soft, nontender, nondistended, normoactive bowel sounds, no palpable masses  INTEGUMENT: good skin turgor, appropriate for ethnicity, no visualized rashes, no jaundice noted  MUSCULOSKELETAL: no clubbing or cyanosis, no obvious deformity, clean ACE wrapped dressing over right foot  NEUROLOGIC: awake, alert, oriented x3, good muscle tone in 4 extremities, no obvious sensory deficits  PSYCHIATRIC: mood is good, affect is congruent with mood, linear and logical thought process  HEME/LYMPH: no palpable supraclavicular nodules, no obvious ecchymosis     T(C): 36.4 (09-25-21 @ 04:46), Max: 36.6 (09-24-21 @ 21:15)  HR: 81 (09-25-21 @ 04:46) (81 - 89)  BP: 102/59 (09-25-21 @ 04:46) (96/63 - 110/60)  RR: 17 (09-25-21 @ 04:46) (17 - 17)  SpO2: 94% (09-25-21 @ 04:46) (94% - 97%)  Wt(kg): --    ----  I&O's Summary    24 Sep 2021 07:01  -  25 Sep 2021 07:00  --------------------------------------------------------  IN: 50 mL / OUT: 600 mL / NET: -550 mL        LABS:                        8.8    3.39  )-----------( 286      ( 25 Sep 2021 08:46 )             27.9     09-25    138  |  105  |  12  ----------------------------<  91  4.8   |  30  |  0.77    Ca    8.8      25 Sep 2021 08:46  Mg     2.1     09-24      PT/INR - ( 25 Sep 2021 08:46 )   PT: 13.9 sec;   INR: 1.20 ratio         PTT - ( 25 Sep 2021 08:46 )  PTT:35.1 sec    ----  Personally reviewed:  Vital sign trends: [ x ] yes    [  ] no     [  ] n/a  Laboratory results: [ x ] yes    [  ] no     [  ] n/a  Radiology results: [  ] yes    [  ] no     [ x ] n/a - no new imaging   Culture results: [ x ] yes    [  ] no     [  ] n/a  Consultant recommendations: [ x ] yes    [  ] no     [  ] n/a

## 2021-09-25 NOTE — PROGRESS NOTE ADULT - ASSESSMENT
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    #Cellulitis of right lower leg and acute OM of right hallux  - Initial blood cultures positive for MSSA bacteremia  - Given recent TAVR, transfer pending to [facility] for CORBIN and ICD extraction  - Repeat BCx from 9/16 resulted negative (final)   - Surgical pathology of right hallux positive for acute osteomyelitis, MSSA   - Continue cefazolin 2g q8h  - Pain well controlled w/ current regimen   - S/p R. hallux amputation 9/20    - S/p RRT 9/23 for ruptured hematoma @ surgical site during PT, wound irrigated and packed, left open to heal by secondary intention  - Podiatry following (Dr. Felipe/Rene), recs noted and appreciated  - ID following (Dr. Bell), recs noted and appreciated  - Cardiology following ([facility]), recs noted and appreciated     #HTN (hypertension)  - C/w Lasix 20mg PO qd, Entresto 1 tab BID, Sotalol 40mg PO BID   - BPs low normal, continue to monitor off home Bystolic  - Monitor routine HD  - Cardiology following ([facility]), recs noted and appreciated     #Pulmonary embolism  - Bilateral PE in 7/2020  - Continue to hold Xarelto for transfer to [facility] ICD extraction  - Continue heparin gtt     #CHF, chronic  - Continue Lasix 20mg PO daily, Entresto 1 tab BID  - Saturating well on RA  - No signs of acute volume overload  - Strict I/Os, daily weights  - Monitor and replace electrolytes  - TTE very limited study, unable to assess LV or RV function, severe MS, bioAVR     #Hx of arrhythmia  - Discussed with pt and his children the reason for his ICD placement, stated >20 years ago, pt had a very fast HR in the setting of a viral infection  - Denied cardiac arrest, suspect sustained VT with pulse and had ICD placement  - Continue sotalol daily   - Plan for transfer for ICD extraction as above    #Anemia (likely 2/2 chronic disease in the setting of pancreatic cancer)  - H/h stable  - Currently hemodynamically stable, monitor for signs and symptoms of active bleeding  - Transfuse for hemoglobin <8  - Thrombocytopenia - resolved     #CAD (coronary artery disease).   - Chronic, s/p cardiac stents x2 ~15 years ago  - Cardiology following ([facility]), recs noted and appreciated     #Pancreatic cancer  - Diagnosed in March 2021, on chemotherapy (does not recall name of medication)  - Continue home Pancrelipase 36,000 2 tabs PO TID with meals  - Per podiatry, Dr. López spoke with Dr. Murphy at [facility] to be postponed until after resolution of acute OM infection    #Dysphagia  - Patient with complaints of coughing/choking on food on 9/18  - S/S consulted, recs dysphagia 3 soft-thin liquids    #Need for prophylactic measure  - VTE ppx w/ heparin gtt

## 2021-09-25 NOTE — PROGRESS NOTE ADULT - ASSESSMENT
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 López, DOS 9/2021- primarily closed - hematoma post operative - patient had rapid response event secondary to blood gushing out on 9/23/2021- Dr. Felipe at bedside     Per Dr. Felipe, removed central sutures and cleaned out the hematoma and copious irrigation with normal saline, area pat dry and dressed with iodoform packing and surgicel NuKnit and DSD and secured with ACE bandage- performed on 9/23/2021    Area dressed with aquacel ag and dsd       Follow up on the OR pathologies and cultures    Follow up on infectious disease recommendations     Per Dr. López, the general surgeon from [place] who originally had the patient's whipple schedules- states that he will cancel the patient's surgery, the Whipple procedure for his pancreatic cancer  that was originally scheduled for this week, the general surgeon cancelled it and states that he will wait until the OR pathologies come back from the amputation and after the infection resolves to avoid any post operative complications once the whipple procedure is done    Area dressed with Surgicel Nu Knit and DSD    Podiatry team will continue to follow patient while in house

## 2021-09-25 NOTE — PROGRESS NOTE ADULT - ATTENDING COMMENTS
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    Cellulitis of right lower leg and Acute OM of right hallux  - Tolerated procedure well, cardiac status optimal post operatively  - Euvolemic on exam.  - Pain control  - MSSA bacteremia on admission BCx; repeat BCx from 9/16 are NGTD
severe ms  pancr ca  appears euvolemic and has not complaints referable to hf  no cv complications s/p pod procedure
spoke to dr carmona at . setup transfer for potential icd extraction and CORBIN. this was explained to pt and fully agreed with plan. Further cardiac workup will depend on clinical course.
No signs of significant ischemia or volume overload. cont current care. Further cardiac workup will depend on clinical course. awaiting transfer to  for device extraction.
Optimized for the OR from a cardiac point of view with no evidence of active ischemic heart disease, decompensated heart failure, severe obstructive valvular disease, or uncontrolled arrhythmia.  Device check on chart.  Normally functioning single chamber ICD.  To follow closely while admitted.
Pt. seen and evaluated for MSSA bacteremia and right hallux OM.  Pt. is in no distress.  Awaiting transfer to [facility] for possible CORBIN and ICD extraction.  Physical examination and plan as above.  Continue IV antibiotic.  Off Xarelto and started on heparin gtt for history of PE
Pt. seen and evaluated for OM of right hallux and MSSA bacteremia.  Pt. is in no distress.  Tolerating IV antibiotic.  Physical examination and plan as above.  Possible transfer to [facility] for CORBIN and ICD extraction.  In the meantime will continue IV Ancef.
see below
Cellulitis of right lower leg and Acute OM of right hallux  - MSSA bacteremia on admission BCx; repeat BCx from 9/16 are NGTD  - Surgical pathology of right hallux: +acute Osteomyelitis, staph aureus   - Podiatry/wound care follow up  - ID  -plan for OR on Monday for R. hallux amputation      Pulmonary embolism.  Bilateral PE in 7/2020  - heparin gtt - start 24hrs after last dose of Xarelto, DC 4-5hrs before surgery    Chronic systolic sp ICD  - On Lasix and entresto   - Tolerating Room air  - no signs of acute volume overload  - Strict I/Os, daily weights  - Monitor and replace electrolytes.  -needs ICD interrogation prior to OR- called   -unclear why on sotalol , need outpatient records   -echo as above revealing not well visualized LV, severe Mitral stenosis, left atrial enlargement
Pt. seen and evaluated for OM of right hallux and MSSA bacteremia.  Pt. is in no distress.  Tolerating IV ancef.  Physical examination and plan as above.  Will continue course of IV antibiotics.  Will discuss with cardiology in regards to CORBIN.
see below
Patient with  MSSA bacteremia.  To have TAVR assessed by CORBIN, but likely to need device extraction.  TO discuss with DR. Quiles to formulate definitive plan.
see below

## 2021-09-26 ENCOUNTER — INPATIENT (INPATIENT)
Facility: HOSPITAL | Age: 81
LOS: 8 days | Discharge: NURSING, REHAB OR LONG-TERM CARE | DRG: 854 | End: 2021-10-05
Attending: STUDENT IN AN ORGANIZED HEALTH CARE EDUCATION/TRAINING PROGRAM | Admitting: STUDENT IN AN ORGANIZED HEALTH CARE EDUCATION/TRAINING PROGRAM
Payer: MEDICARE

## 2021-09-26 VITALS
TEMPERATURE: 98 F | SYSTOLIC BLOOD PRESSURE: 104 MMHG | DIASTOLIC BLOOD PRESSURE: 63 MMHG | HEART RATE: 78 BPM | RESPIRATION RATE: 18 BRPM | OXYGEN SATURATION: 97 %

## 2021-09-26 VITALS
OXYGEN SATURATION: 97 % | DIASTOLIC BLOOD PRESSURE: 66 MMHG | RESPIRATION RATE: 18 BRPM | HEART RATE: 76 BPM | SYSTOLIC BLOOD PRESSURE: 112 MMHG | TEMPERATURE: 98 F | WEIGHT: 218.92 LBS | HEIGHT: 73 IN

## 2021-09-26 DIAGNOSIS — R78.81 BACTEREMIA: ICD-10-CM

## 2021-09-26 DIAGNOSIS — D64.9 ANEMIA, UNSPECIFIED: ICD-10-CM

## 2021-09-26 DIAGNOSIS — Z98.89 OTHER SPECIFIED POSTPROCEDURAL STATES: Chronic | ICD-10-CM

## 2021-09-26 DIAGNOSIS — R13.10 DYSPHAGIA, UNSPECIFIED: ICD-10-CM

## 2021-09-26 DIAGNOSIS — M86.10 OTHER ACUTE OSTEOMYELITIS, UNSPECIFIED SITE: ICD-10-CM

## 2021-09-26 DIAGNOSIS — I50.22 CHRONIC SYSTOLIC (CONGESTIVE) HEART FAILURE: ICD-10-CM

## 2021-09-26 DIAGNOSIS — Z96.649 PRESENCE OF UNSPECIFIED ARTIFICIAL HIP JOINT: Chronic | ICD-10-CM

## 2021-09-26 DIAGNOSIS — D29.20 BENIGN NEOPLASM OF UNSPECIFIED TESTIS: Chronic | ICD-10-CM

## 2021-09-26 DIAGNOSIS — Z95.810 PRESENCE OF AUTOMATIC (IMPLANTABLE) CARDIAC DEFIBRILLATOR: Chronic | ICD-10-CM

## 2021-09-26 DIAGNOSIS — Z86.79 PERSONAL HISTORY OF OTHER DISEASES OF THE CIRCULATORY SYSTEM: ICD-10-CM

## 2021-09-26 DIAGNOSIS — Z96.60 PRESENCE OF UNSPECIFIED ORTHOPEDIC JOINT IMPLANT: Chronic | ICD-10-CM

## 2021-09-26 DIAGNOSIS — Z89.421 ACQUIRED ABSENCE OF OTHER RIGHT TOE(S): Chronic | ICD-10-CM

## 2021-09-26 DIAGNOSIS — Z86.711 PERSONAL HISTORY OF PULMONARY EMBOLISM: ICD-10-CM

## 2021-09-26 DIAGNOSIS — Z29.9 ENCOUNTER FOR PROPHYLACTIC MEASURES, UNSPECIFIED: ICD-10-CM

## 2021-09-26 DIAGNOSIS — I25.10 ATHEROSCLEROTIC HEART DISEASE OF NATIVE CORONARY ARTERY WITHOUT ANGINA PECTORIS: ICD-10-CM

## 2021-09-26 DIAGNOSIS — Z02.9 ENCOUNTER FOR ADMINISTRATIVE EXAMINATIONS, UNSPECIFIED: ICD-10-CM

## 2021-09-26 DIAGNOSIS — C25.9 MALIGNANT NEOPLASM OF PANCREAS, UNSPECIFIED: ICD-10-CM

## 2021-09-26 LAB
ANION GAP SERPL CALC-SCNC: 5 MMOL/L — SIGNIFICANT CHANGE UP (ref 5–17)
APTT BLD: 119.4 SEC — HIGH (ref 27.5–35.5)
APTT BLD: 138.5 SEC — CRITICAL HIGH (ref 27.5–35.5)
APTT BLD: 82.9 SEC — HIGH (ref 27.5–35.5)
BUN SERPL-MCNC: 13 MG/DL — SIGNIFICANT CHANGE UP (ref 7–23)
CALCIUM SERPL-MCNC: 8.6 MG/DL — SIGNIFICANT CHANGE UP (ref 8.5–10.1)
CHLORIDE SERPL-SCNC: 104 MMOL/L — SIGNIFICANT CHANGE UP (ref 96–108)
CK MB BLD-MCNC: <5.3 % — HIGH (ref 0–3.5)
CK MB CFR SERPL CALC: <1 NG/ML — SIGNIFICANT CHANGE UP (ref 0–3.6)
CK SERPL-CCNC: 19 U/L — LOW (ref 26–308)
CO2 SERPL-SCNC: 29 MMOL/L — SIGNIFICANT CHANGE UP (ref 22–31)
CREAT SERPL-MCNC: 0.82 MG/DL — SIGNIFICANT CHANGE UP (ref 0.5–1.3)
GLUCOSE SERPL-MCNC: 94 MG/DL — SIGNIFICANT CHANGE UP (ref 70–99)
HCT VFR BLD CALC: 27.6 % — LOW (ref 39–50)
HCT VFR BLD CALC: 28.8 % — LOW (ref 39–50)
HGB BLD-MCNC: 8.6 G/DL — LOW (ref 13–17)
HGB BLD-MCNC: 9 G/DL — LOW (ref 13–17)
MAGNESIUM SERPL-MCNC: 1.9 MG/DL — SIGNIFICANT CHANGE UP (ref 1.6–2.6)
MCHC RBC-ENTMCNC: 31.2 GM/DL — LOW (ref 32–36)
MCHC RBC-ENTMCNC: 31.3 GM/DL — LOW (ref 32–36)
MCHC RBC-ENTMCNC: 33 PG — SIGNIFICANT CHANGE UP (ref 27–34)
MCHC RBC-ENTMCNC: 33.1 PG — SIGNIFICANT CHANGE UP (ref 27–34)
MCV RBC AUTO: 105.5 FL — HIGH (ref 80–100)
MCV RBC AUTO: 106.2 FL — HIGH (ref 80–100)
NRBC # BLD: 0 /100 WBCS — SIGNIFICANT CHANGE UP (ref 0–0)
NRBC # BLD: 0 /100 WBCS — SIGNIFICANT CHANGE UP (ref 0–0)
PHOSPHATE SERPL-MCNC: 4.3 MG/DL — SIGNIFICANT CHANGE UP (ref 2.5–4.5)
PLATELET # BLD AUTO: 299 K/UL — SIGNIFICANT CHANGE UP (ref 150–400)
PLATELET # BLD AUTO: 309 K/UL — SIGNIFICANT CHANGE UP (ref 150–400)
POTASSIUM SERPL-MCNC: 4.3 MMOL/L — SIGNIFICANT CHANGE UP (ref 3.5–5.3)
POTASSIUM SERPL-SCNC: 4.3 MMOL/L — SIGNIFICANT CHANGE UP (ref 3.5–5.3)
RBC # BLD: 2.6 M/UL — LOW (ref 4.2–5.8)
RBC # BLD: 2.73 M/UL — LOW (ref 4.2–5.8)
RBC # FLD: 15.3 % — HIGH (ref 10.3–14.5)
RBC # FLD: 15.3 % — HIGH (ref 10.3–14.5)
SODIUM SERPL-SCNC: 138 MMOL/L — SIGNIFICANT CHANGE UP (ref 135–145)
TROPONIN I SERPL-MCNC: <.015 NG/ML — SIGNIFICANT CHANGE UP (ref 0.01–0.04)
WBC # BLD: 4.07 K/UL — SIGNIFICANT CHANGE UP (ref 3.8–10.5)
WBC # BLD: 4.36 K/UL — SIGNIFICANT CHANGE UP (ref 3.8–10.5)
WBC # FLD AUTO: 4.07 K/UL — SIGNIFICANT CHANGE UP (ref 3.8–10.5)
WBC # FLD AUTO: 4.36 K/UL — SIGNIFICANT CHANGE UP (ref 3.8–10.5)

## 2021-09-26 PROCEDURE — 94760 N-INVAS EAR/PLS OXIMETRY 1: CPT

## 2021-09-26 PROCEDURE — 84484 ASSAY OF TROPONIN QUANT: CPT

## 2021-09-26 PROCEDURE — 86901 BLOOD TYPING SEROLOGIC RH(D): CPT

## 2021-09-26 PROCEDURE — 96365 THER/PROPH/DIAG IV INF INIT: CPT

## 2021-09-26 PROCEDURE — 82746 ASSAY OF FOLIC ACID SERUM: CPT

## 2021-09-26 PROCEDURE — 73610 X-RAY EXAM OF ANKLE: CPT

## 2021-09-26 PROCEDURE — 76937 US GUIDE VASCULAR ACCESS: CPT

## 2021-09-26 PROCEDURE — 87635 SARS-COV-2 COVID-19 AMP PRB: CPT

## 2021-09-26 PROCEDURE — 82553 CREATINE MB FRACTION: CPT

## 2021-09-26 PROCEDURE — 83036 HEMOGLOBIN GLYCOSYLATED A1C: CPT

## 2021-09-26 PROCEDURE — 83540 ASSAY OF IRON: CPT

## 2021-09-26 PROCEDURE — 80076 HEPATIC FUNCTION PANEL: CPT

## 2021-09-26 PROCEDURE — 92526 ORAL FUNCTION THERAPY: CPT

## 2021-09-26 PROCEDURE — 87077 CULTURE AEROBIC IDENTIFY: CPT

## 2021-09-26 PROCEDURE — 87150 DNA/RNA AMPLIFIED PROBE: CPT

## 2021-09-26 PROCEDURE — 87070 CULTURE OTHR SPECIMN AEROBIC: CPT

## 2021-09-26 PROCEDURE — 86769 SARS-COV-2 COVID-19 ANTIBODY: CPT

## 2021-09-26 PROCEDURE — 99285 EMERGENCY DEPT VISIT HI MDM: CPT | Mod: 25

## 2021-09-26 PROCEDURE — U0003: CPT

## 2021-09-26 PROCEDURE — 93306 TTE W/DOPPLER COMPLETE: CPT

## 2021-09-26 PROCEDURE — C1751: CPT

## 2021-09-26 PROCEDURE — 93308 TTE F-UP OR LMTD: CPT

## 2021-09-26 PROCEDURE — 84100 ASSAY OF PHOSPHORUS: CPT

## 2021-09-26 PROCEDURE — 87040 BLOOD CULTURE FOR BACTERIA: CPT

## 2021-09-26 PROCEDURE — 87086 URINE CULTURE/COLONY COUNT: CPT

## 2021-09-26 PROCEDURE — 96367 TX/PROPH/DG ADDL SEQ IV INF: CPT

## 2021-09-26 PROCEDURE — 81001 URINALYSIS AUTO W/SCOPE: CPT

## 2021-09-26 PROCEDURE — 85025 COMPLETE CBC W/AUTO DIFF WBC: CPT

## 2021-09-26 PROCEDURE — 83735 ASSAY OF MAGNESIUM: CPT

## 2021-09-26 PROCEDURE — 86900 BLOOD TYPING SEROLOGIC ABO: CPT

## 2021-09-26 PROCEDURE — 36573 INSJ PICC RS&I 5 YR+: CPT

## 2021-09-26 PROCEDURE — 83605 ASSAY OF LACTIC ACID: CPT

## 2021-09-26 PROCEDURE — 82607 VITAMIN B-12: CPT

## 2021-09-26 PROCEDURE — U0005: CPT

## 2021-09-26 PROCEDURE — 77001 FLUOROGUIDE FOR VEIN DEVICE: CPT

## 2021-09-26 PROCEDURE — 87186 SC STD MICRODIL/AGAR DIL: CPT

## 2021-09-26 PROCEDURE — 86140 C-REACTIVE PROTEIN: CPT

## 2021-09-26 PROCEDURE — 83550 IRON BINDING TEST: CPT

## 2021-09-26 PROCEDURE — 93005 ELECTROCARDIOGRAM TRACING: CPT

## 2021-09-26 PROCEDURE — 92610 EVALUATE SWALLOWING FUNCTION: CPT

## 2021-09-26 PROCEDURE — C8929: CPT

## 2021-09-26 PROCEDURE — 93010 ELECTROCARDIOGRAM REPORT: CPT | Mod: 77

## 2021-09-26 PROCEDURE — 94640 AIRWAY INHALATION TREATMENT: CPT

## 2021-09-26 PROCEDURE — 80053 COMPREHEN METABOLIC PANEL: CPT

## 2021-09-26 PROCEDURE — 82728 ASSAY OF FERRITIN: CPT

## 2021-09-26 PROCEDURE — C1889: CPT

## 2021-09-26 PROCEDURE — 71045 X-RAY EXAM CHEST 1 VIEW: CPT

## 2021-09-26 PROCEDURE — 73590 X-RAY EXAM OF LOWER LEG: CPT

## 2021-09-26 PROCEDURE — G0463: CPT

## 2021-09-26 PROCEDURE — 97161 PT EVAL LOW COMPLEX 20 MIN: CPT

## 2021-09-26 PROCEDURE — 93010 ELECTROCARDIOGRAM REPORT: CPT

## 2021-09-26 PROCEDURE — 85610 PROTHROMBIN TIME: CPT

## 2021-09-26 PROCEDURE — 86850 RBC ANTIBODY SCREEN: CPT

## 2021-09-26 PROCEDURE — 85730 THROMBOPLASTIN TIME PARTIAL: CPT

## 2021-09-26 PROCEDURE — 99239 HOSP IP/OBS DSCHRG MGMT >30: CPT

## 2021-09-26 PROCEDURE — 87075 CULTR BACTERIA EXCEPT BLOOD: CPT

## 2021-09-26 PROCEDURE — 82550 ASSAY OF CK (CPK): CPT

## 2021-09-26 PROCEDURE — 88305 TISSUE EXAM BY PATHOLOGIST: CPT

## 2021-09-26 PROCEDURE — 85027 COMPLETE CBC AUTOMATED: CPT

## 2021-09-26 PROCEDURE — 99223 1ST HOSP IP/OBS HIGH 75: CPT | Mod: GC

## 2021-09-26 PROCEDURE — 73630 X-RAY EXAM OF FOOT: CPT

## 2021-09-26 PROCEDURE — 85652 RBC SED RATE AUTOMATED: CPT

## 2021-09-26 PROCEDURE — 80048 BASIC METABOLIC PNL TOTAL CA: CPT

## 2021-09-26 PROCEDURE — 80202 ASSAY OF VANCOMYCIN: CPT

## 2021-09-26 PROCEDURE — 88311 DECALCIFY TISSUE: CPT

## 2021-09-26 PROCEDURE — 36415 COLL VENOUS BLD VENIPUNCTURE: CPT

## 2021-09-26 RX ORDER — ALPRAZOLAM 0.25 MG
0.25 TABLET ORAL THREE TIMES A DAY
Refills: 0 | Status: DISCONTINUED | OUTPATIENT
Start: 2021-09-26 | End: 2021-09-28

## 2021-09-26 RX ORDER — SOTALOL HCL 120 MG
40 TABLET ORAL
Refills: 0 | Status: DISCONTINUED | OUTPATIENT
Start: 2021-09-26 | End: 2021-09-28

## 2021-09-26 RX ORDER — KETOROLAC TROMETHAMINE 0.5 %
1 DROPS OPHTHALMIC (EYE) DAILY
Refills: 0 | Status: DISCONTINUED | OUTPATIENT
Start: 2021-09-26 | End: 2021-09-28

## 2021-09-26 RX ORDER — TAMSULOSIN HYDROCHLORIDE 0.4 MG/1
0.4 CAPSULE ORAL AT BEDTIME
Refills: 0 | Status: DISCONTINUED | OUTPATIENT
Start: 2021-09-26 | End: 2021-09-28

## 2021-09-26 RX ORDER — LIPASE/PROTEASE/AMYLASE 16-48-48K
2 CAPSULE,DELAYED RELEASE (ENTERIC COATED) ORAL
Refills: 0 | Status: DISCONTINUED | OUTPATIENT
Start: 2021-09-26 | End: 2021-09-28

## 2021-09-26 RX ORDER — HEPARIN SODIUM 5000 [USP'U]/ML
8000 INJECTION INTRAVENOUS; SUBCUTANEOUS EVERY 6 HOURS
Refills: 0 | Status: DISCONTINUED | OUTPATIENT
Start: 2021-09-26 | End: 2021-09-28

## 2021-09-26 RX ORDER — DULOXETINE HYDROCHLORIDE 30 MG/1
60 CAPSULE, DELAYED RELEASE ORAL DAILY
Refills: 0 | Status: DISCONTINUED | OUTPATIENT
Start: 2021-09-26 | End: 2021-09-28

## 2021-09-26 RX ORDER — PANTOPRAZOLE SODIUM 20 MG/1
40 TABLET, DELAYED RELEASE ORAL
Refills: 0 | Status: DISCONTINUED | OUTPATIENT
Start: 2021-09-26 | End: 2021-09-28

## 2021-09-26 RX ORDER — ACETAMINOPHEN 500 MG
650 TABLET ORAL EVERY 6 HOURS
Refills: 0 | Status: DISCONTINUED | OUTPATIENT
Start: 2021-09-26 | End: 2021-09-26

## 2021-09-26 RX ORDER — ACETAMINOPHEN 500 MG
650 TABLET ORAL EVERY 6 HOURS
Refills: 0 | Status: DISCONTINUED | OUTPATIENT
Start: 2021-09-26 | End: 2021-09-28

## 2021-09-26 RX ORDER — PYRIDOXINE HCL (VITAMIN B6) 100 MG
100 TABLET ORAL DAILY
Refills: 0 | Status: DISCONTINUED | OUTPATIENT
Start: 2021-09-26 | End: 2021-09-28

## 2021-09-26 RX ORDER — SACUBITRIL AND VALSARTAN 24; 26 MG/1; MG/1
1 TABLET, FILM COATED ORAL
Refills: 0 | Status: DISCONTINUED | OUTPATIENT
Start: 2021-09-26 | End: 2021-09-28

## 2021-09-26 RX ORDER — OXYCODONE AND ACETAMINOPHEN 5; 325 MG/1; MG/1
1 TABLET ORAL EVERY 6 HOURS
Refills: 0 | Status: DISCONTINUED | OUTPATIENT
Start: 2021-09-26 | End: 2021-09-28

## 2021-09-26 RX ORDER — DORZOLAMIDE HYDROCHLORIDE TIMOLOL MALEATE 20; 5 MG/ML; MG/ML
1 SOLUTION/ DROPS OPHTHALMIC
Refills: 0 | Status: DISCONTINUED | OUTPATIENT
Start: 2021-09-26 | End: 2021-09-28

## 2021-09-26 RX ORDER — HEPARIN SODIUM 5000 [USP'U]/ML
1000 INJECTION INTRAVENOUS; SUBCUTANEOUS
Qty: 25000 | Refills: 0 | Status: DISCONTINUED | OUTPATIENT
Start: 2021-09-26 | End: 2021-09-27

## 2021-09-26 RX ORDER — INFLUENZA VIRUS VACCINE 15; 15; 15; 15 UG/.5ML; UG/.5ML; UG/.5ML; UG/.5ML
0.5 SUSPENSION INTRAMUSCULAR ONCE
Refills: 0 | Status: DISCONTINUED | OUTPATIENT
Start: 2021-09-26 | End: 2021-10-05

## 2021-09-26 RX ORDER — LANOLIN ALCOHOL/MO/W.PET/CERES
6 CREAM (GRAM) TOPICAL AT BEDTIME
Refills: 0 | Status: DISCONTINUED | OUTPATIENT
Start: 2021-09-26 | End: 2021-09-28

## 2021-09-26 RX ORDER — HEPARIN SODIUM 5000 [USP'U]/ML
4000 INJECTION INTRAVENOUS; SUBCUTANEOUS EVERY 6 HOURS
Refills: 0 | Status: DISCONTINUED | OUTPATIENT
Start: 2021-09-26 | End: 2021-09-28

## 2021-09-26 RX ORDER — FUROSEMIDE 40 MG
20 TABLET ORAL DAILY
Refills: 0 | Status: DISCONTINUED | OUTPATIENT
Start: 2021-09-26 | End: 2021-09-27

## 2021-09-26 RX ORDER — CEFAZOLIN SODIUM 1 G
2000 VIAL (EA) INJECTION EVERY 8 HOURS
Refills: 0 | Status: DISCONTINUED | OUTPATIENT
Start: 2021-09-26 | End: 2021-09-28

## 2021-09-26 RX ORDER — LATANOPROST 0.05 MG/ML
1 SOLUTION/ DROPS OPHTHALMIC; TOPICAL AT BEDTIME
Refills: 0 | Status: DISCONTINUED | OUTPATIENT
Start: 2021-09-26 | End: 2021-09-28

## 2021-09-26 RX ORDER — GABAPENTIN 400 MG/1
300 CAPSULE ORAL
Refills: 0 | Status: DISCONTINUED | OUTPATIENT
Start: 2021-09-26 | End: 2021-09-28

## 2021-09-26 RX ADMIN — PANTOPRAZOLE SODIUM 40 MILLIGRAM(S): 20 TABLET, DELAYED RELEASE ORAL at 05:07

## 2021-09-26 RX ADMIN — SACUBITRIL AND VALSARTAN 1 TABLET(S): 24; 26 TABLET, FILM COATED ORAL at 05:08

## 2021-09-26 RX ADMIN — Medication 20 MILLIGRAM(S): at 17:56

## 2021-09-26 RX ADMIN — Medication 100 MILLIGRAM(S): at 18:04

## 2021-09-26 RX ADMIN — LATANOPROST 1 DROP(S): 0.05 SOLUTION/ DROPS OPHTHALMIC; TOPICAL at 22:20

## 2021-09-26 RX ADMIN — DULOXETINE HYDROCHLORIDE 60 MILLIGRAM(S): 30 CAPSULE, DELAYED RELEASE ORAL at 18:04

## 2021-09-26 RX ADMIN — Medication 1 DROP(S): at 18:39

## 2021-09-26 RX ADMIN — Medication 0.25 MILLIGRAM(S): at 15:51

## 2021-09-26 RX ADMIN — DORZOLAMIDE HYDROCHLORIDE TIMOLOL MALEATE 1 DROP(S): 20; 5 SOLUTION/ DROPS OPHTHALMIC at 05:08

## 2021-09-26 RX ADMIN — Medication 40 MILLIGRAM(S): at 17:55

## 2021-09-26 RX ADMIN — Medication 2 CAPSULE(S): at 17:58

## 2021-09-26 RX ADMIN — TAMSULOSIN HYDROCHLORIDE 0.4 MILLIGRAM(S): 0.4 CAPSULE ORAL at 22:12

## 2021-09-26 RX ADMIN — DORZOLAMIDE HYDROCHLORIDE TIMOLOL MALEATE 1 DROP(S): 20; 5 SOLUTION/ DROPS OPHTHALMIC at 18:39

## 2021-09-26 RX ADMIN — Medication 100 MILLIGRAM(S): at 13:03

## 2021-09-26 RX ADMIN — Medication 40 MILLIGRAM(S): at 05:07

## 2021-09-26 RX ADMIN — HEPARIN SODIUM 0 UNIT(S)/HR: 5000 INJECTION INTRAVENOUS; SUBCUTANEOUS at 02:09

## 2021-09-26 RX ADMIN — OXYCODONE AND ACETAMINOPHEN 1 TABLET(S): 5; 325 TABLET ORAL at 22:12

## 2021-09-26 RX ADMIN — Medication 1 TABLET(S): at 17:55

## 2021-09-26 RX ADMIN — Medication 2 CAPSULE(S): at 12:58

## 2021-09-26 RX ADMIN — OXYCODONE AND ACETAMINOPHEN 1 TABLET(S): 5; 325 TABLET ORAL at 13:40

## 2021-09-26 RX ADMIN — LATANOPROST 1 DROP(S): 0.05 SOLUTION/ DROPS OPHTHALMIC; TOPICAL at 00:04

## 2021-09-26 RX ADMIN — HEPARIN SODIUM UNIT(S)/HR: 5000 INJECTION INTRAVENOUS; SUBCUTANEOUS at 09:19

## 2021-09-26 RX ADMIN — HEPARIN SODIUM 1000 UNIT(S)/HR: 5000 INJECTION INTRAVENOUS; SUBCUTANEOUS at 21:03

## 2021-09-26 RX ADMIN — HEPARIN SODIUM 1000 UNIT(S)/HR: 5000 INJECTION INTRAVENOUS; SUBCUTANEOUS at 12:03

## 2021-09-26 RX ADMIN — Medication 100 MILLIGRAM(S): at 05:08

## 2021-09-26 RX ADMIN — CHLORHEXIDINE GLUCONATE 1 APPLICATION(S): 213 SOLUTION TOPICAL at 05:15

## 2021-09-26 RX ADMIN — GABAPENTIN 300 MILLIGRAM(S): 400 CAPSULE ORAL at 05:07

## 2021-09-26 RX ADMIN — OXYCODONE AND ACETAMINOPHEN 1 TABLET(S): 5; 325 TABLET ORAL at 23:59

## 2021-09-26 RX ADMIN — Medication 6 MILLIGRAM(S): at 22:12

## 2021-09-26 RX ADMIN — SACUBITRIL AND VALSARTAN 1 TABLET(S): 24; 26 TABLET, FILM COATED ORAL at 17:57

## 2021-09-26 RX ADMIN — Medication 0.25 MILLIGRAM(S): at 10:21

## 2021-09-26 RX ADMIN — Medication 0.25 MILLIGRAM(S): at 23:51

## 2021-09-26 RX ADMIN — OXYCODONE AND ACETAMINOPHEN 1 TABLET(S): 5; 325 TABLET ORAL at 12:58

## 2021-09-26 RX ADMIN — Medication 100 MILLIGRAM(S): at 22:12

## 2021-09-26 RX ADMIN — GABAPENTIN 300 MILLIGRAM(S): 400 CAPSULE ORAL at 17:56

## 2021-09-26 RX ADMIN — Medication 20 MILLIGRAM(S): at 05:08

## 2021-09-26 RX ADMIN — LIDOCAINE 1 PATCH: 4 CREAM TOPICAL at 01:45

## 2021-09-26 RX ADMIN — Medication 2 CAPSULE(S): at 08:33

## 2021-09-26 NOTE — H&P ADULT - PROBLEM SELECTOR PLAN 1
With RLE cellulitis With RLE cellulitis  - Initial blood cultures 9/15 positive for MSSA bacteremia  - Repeat BCx from 9/16 NGTD  - S/p R. hallux amputation 9/20  Surgical pathology of right hallux positive for acute osteomyelitis, MSSA   - Continue cefazolin 2g q8h per ID recs at [place]  - Pain well controlled w/ current regimen   - S/p RRT 9/23 for ruptured hematoma @ surgical site during PT, wound irrigated and packed, left open to heal by secondary intention  - Outpatient podiatrist dr Felipe. Podiatry reconsulted. Will f/u recs.   - Cardiology/EP following. Plan for CORBIN 9/27 to assess for Vegetations on ICD. If any concern for vegetations will plan for ICD retrieval. With RLE cellulitis  - Initial blood cultures 9/15 positive for MSSA bacteremia  - Repeat BCx from 9/16 NGTD  - S/p R. hallux amputation 9/20  Surgical pathology of right hallux positive for acute osteomyelitis, MSSA   - Continue cefazolin 2g q8h per ID recs at [place]. Plan is to continue 4-6 weeks abx depending on result of CORBIN (as late as 10/27)  - S/P PICC line placement 9/22 RUE  - Pain well controlled w/ current regimen   - S/p RRT 9/23 for ruptured hematoma @ surgical site during PT, wound irrigated and packed, left open to heal by secondary intention  - Outpatient podiatrist dr Felipe. Podiatry reconsulted. Will f/u recs.   - Cardiology/EP following. Plan for CORBIN 9/27 to assess for Vegetations on ICD. If any concern for vegetations will plan for ICD retrieval.

## 2021-09-26 NOTE — H&P ADULT - PROBLEM SELECTOR PLAN 8
- Patient with complaints of coughing/choking on food on 9/18  - S/S consulted at OSH, recs dysphagia 3 soft-thin liquids. Will continue here.  -Aspiration precautions

## 2021-09-26 NOTE — H&P ADULT - PROBLEM SELECTOR PLAN 5
- ICD placed >20 years ago, pt had a very fast HR in the setting of a viral infection, but unclear exactly what arrhythmia  - Denied cardiac arrest, suspect sustained VT with pulse and had ICD placement  - Continue sotalol BID  -ICD interrogated at OSH, no shocks recorded, ICD functioning battery life 2.5 years; Vpaced <1%  - Consideration of ICD extraction as above - ICD placed >20 years ago, pt had a very fast HR in the setting of a viral infection, but unclear exactly what arrhythmia  - Denied cardiac arrest, suspect sustained VT with pulse and had ICD placement  - Continue sotalol BID  -ICD interrogated at OSH, no shocks recorded, ICD functioning battery life 2.5 years; Vpaced <1%  - Consideration of ICD extraction as above  -Pt with apparent AICD fired x 1 overnight 9/25, pt asymptomatic, denied chest pain other than feeling shock. Will monitor. Currently sinus with some v-pacing on tele

## 2021-09-26 NOTE — H&P ADULT - NSICDXPASTSURGICALHX_GEN_ALL_CORE_FT
PAST SURGICAL HISTORY:  Benign testicular tumor     History of hip replacement     History of laminectomy     History of total hip replacement left    ICD (implantable cardiac defibrillator) in place     Status post amputation of toe of right foot

## 2021-09-26 NOTE — PROVIDER CONTACT NOTE (CRITICAL VALUE NOTIFICATION) - RECOMMENDATIONS
Heparin Nomogram followed
follow protocol
According to protocol heparin held for 1 hr and reduce rate to 12cc/hr.

## 2021-09-26 NOTE — PROVIDER CONTACT NOTE (CRITICAL VALUE NOTIFICATION) - BACKGROUND
Admitting dx: cellulitis  PMH: HTN, cardiomyopathy, GERD, HLD, osteomyelitis, CHF, pancreatic ca, CAD, anemia, pulmonary embolism, ICD
Heparin drip in progress
pt resting in bed now.

## 2021-09-26 NOTE — H&P ADULT - PROBLEM SELECTOR PLAN 4
Likely anemia of chronic disease in setting of pancreatic cancer  - H/h stable  - Currently hemodynamically stable, monitor for signs and symptoms of active bleeding especially while on therapeutic AC  - Transfuse for hemoglobin <8

## 2021-09-26 NOTE — H&P ADULT - ATTENDING COMMENTS
80 year old male with PMH pancreatic cancer (dx 3/2021, currently undergoing chemo), HTN, HLD, CHF (unknown EF), CAD s/p stents x2 (~15 years ago), defibrillator, TAVR (4/2021), bilateral PE (7/2021) on Xarelto, spinal stenosis, GERD, BPH, macular degeneration initially presented to [facility] c/o R foot pain found to have osteomyelitis and MRSA bacteremia on Ancef for total 4-6 weeks. Pt transferred for CORBIN and EP eval should ICD need to be extracted. Pt has no complaints other then wanting his right foot dressing changed given strikethrough. Podiatry consulted. Plan for CORBIN. NPO after midnight. c/w Ancef, has PICC in place

## 2021-09-26 NOTE — H&P ADULT - NSHPLABSRESULTS_GEN_ALL_CORE
9.0    4.36  )-----------( 309      ( 26 Sep 2021 08:03 )             28.8       09-26    138  |  104  |  13  ----------------------------<  94  4.3   |  29  |  0.82    Ca    8.6      26 Sep 2021 08:03  Phos  4.3     09-26  Mg     1.9     09-26                    PT/INR - ( 25 Sep 2021 08:46 )   PT: 13.9 sec;   INR: 1.20 ratio         PTT - ( 26 Sep 2021 08:03 )  PTT:119.4 sec    Lactate Trend      CARDIAC MARKERS ( 26 Sep 2021 00:29 )  <.015 ng/mL / x     / 19 U/L / x     / <1.0 ng/mL        CAPILLARY BLOOD GLUCOSE            Culture Results:   Few Pseudomonas aeruginosa  Rare Staphylococcus pettenkoferi (09-21 @ 01:30)  Culture Results:   Rare Pseudomonas aeruginosa  Rare Staphylococcus pettenkoferi (09-21 @ 01:29)  Culture Results:   No Growth Final (09-16 @ 12:01)  Culture Results:   No Growth Final (09-16 @ 12:01)  Culture Results:   Few Staphylococcus aureus (09-16 @ 01:16)  Culture Results:   <10,000 CFU/mL Normal Urogenital Juani (09-15 @ 04:15)  Culture Results:   Growth in aerobic bottle: Staphylococcus aureus  See previous culture [id number] (09-15 @ 01:20)  Culture Results:   Growth in aerobic and anaerobic bottles: Staphylococcus aureus  ***Blood Panel PCR results on this specimen are available  approximately 3 hours after the Gram stain result.***  Gram stain, PCR, and/or culture results may not always  correspond dueto difference in methodologies.  ************************************************************  This PCR assay was performed by multiplex PCR. This  Assay tests for 66 bacterial and resistance gene targets.  Please refer to the [facility] test directory  at [email or web address] for details. (09-15 @ 01:20)

## 2021-09-26 NOTE — H&P ADULT - NEGATIVE MUSCULOSKELETAL SYMPTOMS
no arthralgia/no arthritis/no joint swelling/no myalgia/no muscle cramps/no muscle weakness/no stiffness/no arm pain L/no arm pain R/no back pain/no leg pain L

## 2021-09-26 NOTE — CHART NOTE - NSCHARTNOTEFT_GEN_A_CORE
iSTOP Reference ID: [id number]    Patient Name: Malcolm Cosby Date: 1940  Address: [address] [place], [place] [address]Qpo: Male  Rx Written	Rx Dispensed	Drug	Quantity	Days Supply	Prescriber Name	Prescriber Cris #	Payment Method	Dispenser  04/15/2021	04/16/2021	oxycodone-acetaminophen 5-325 mg tab	10	3	Stephani Galeas	[id number]	Insurance	[place]   04/12/2021	04/13/2021	alprazolam 0.25 mg tablet	90	30	EssDonnell lim	[id number]	Insurance	[place]   12/04/2020	12/04/2020	alprazolam 0.25 mg tablet	90	30	EssDonnell lim	[id number]	[facility]     Patient Name: Malcolm Cosby Date: 1940  Address[address] [place], [place] [address]Ydo: Male  Rx Written	Rx Dispensed	Drug	Quantity	Days Supply	Prescriber Name	Prescriber Cris #	Payment Method	Dispenser  06/11/2021	06/12/2021	alprazolam 0.25 mg tablet	90	30	EssDonnell lim	[id number]	[facility] [employer]    Tim Fletcher MD  Internal Medicine PGY-3  [phone] / [phone]

## 2021-09-26 NOTE — H&P ADULT - PROBLEM SELECTOR PLAN 3
- Bilateral PE in 7/2020, on Xarelto   - Holding xarelto, on heparin drip in setting of upcoming procedure  -Will restart xarelto when able.

## 2021-09-26 NOTE — CONSULT NOTE ADULT - SUBJECTIVE AND OBJECTIVE BOX
Podiatry pager #: [phone]/ [phone]    Patient is a 80y old  Male who presents with a chief complaint of osteomylitis, c/f endocarditis (26 Sep 2021 14:04)      HPI:  Mr. Gatica is a 80 year old male with PMH pancreatic cancer (dx 3/2021, currently undergoing chemo), HTN, HLD, CHF (unknown EF), CAD s/p stents x2 (~15 years ago), defibrillator, TAVR (4/2021), bilateral PE (7/2021) on Xarelto, spinal stenosis, GERD, BPH, macular degeneration initially presented to [facility] c/o R foot pain admitted for R hallux pressure injury and RLE cellulitis found to have MSSA bacteremia and acute OM of right hallux. Given recent TAVR, transfered [facility] for CORBIN and consideration of ICD extraction. At [place], S/p R. hallux amputation 9/20 and RRT 9/23 for ruptured hematoma @ surgical site during PT, wound irrigated and packed, left open to heal by secondary intention. Bcx 9/15 growing MSSA, with clearance on Bcx 9/16.  (26 Sep 2021 14:04)      PAST MEDICAL & SURGICAL HISTORY:  HTN (hypertension)    HLD (hyperlipidemia)    GERD (gastroesophageal reflux disease)    Cardiomyopathy    History of total hip replacement  left    History of laminectomy    ICD (implantable cardiac defibrillator) in place    Benign testicular tumor    History of hip replacement    Status post amputation of toe of right foot        MEDICATIONS  (STANDING):  ceFAZolin   IVPB 2000 milliGRAM(s) IV Intermittent every 8 hours  dorzolamide 2%/timolol 0.5% Ophthalmic Solution 1 Drop(s) Both EYES two times a day  DULoxetine 60 milliGRAM(s) Oral daily  furosemide    Tablet 20 milliGRAM(s) Oral daily  gabapentin 300 milliGRAM(s) Oral two times a day  heparin  Infusion. 1000 Unit(s)/Hr (10 mL/Hr) IV Continuous <Continuous>  influenza   Vaccine 0.5 milliLiter(s) IntraMuscular once  ketorolac 0.5% Ophthalmic Solution 1 Drop(s) Both EYES daily  latanoprost 0.005% Ophthalmic Solution 1 Drop(s) Both EYES at bedtime  melatonin 6 milliGRAM(s) Oral at bedtime  multivitamin 1 Tablet(s) Oral daily  pancrelipase  (CREON 36,000 Lipase Units) 2 Capsule(s) Oral three times a day with meals  pantoprazole    Tablet 40 milliGRAM(s) Oral before breakfast  pyridoxine 100 milliGRAM(s) Oral daily  sacubitril 49 mG/valsartan 51 mG 1 Tablet(s) Oral two times a day  sotalol 40 milliGRAM(s) Oral two times a day  tamsulosin 0.4 milliGRAM(s) Oral at bedtime    MEDICATIONS  (PRN):  acetaminophen   Tablet .. 650 milliGRAM(s) Oral every 6 hours PRN Temp greater or equal to 38C (100.4F), Mild Pain (1 - 3)  ALPRAZolam 0.25 milliGRAM(s) Oral three times a day PRN Anxiety/agitation  heparin   Injectable 8000 Unit(s) IV Push every 6 hours PRN For aPTT less than 40  heparin   Injectable 4000 Unit(s) IV Push every 6 hours PRN For aPTT between 40 - 57  oxycodone    5 mG/acetaminophen 325 mG 1 Tablet(s) Oral every 6 hours PRN Moderate Pain (4 - 6)      Allergies    No Known Allergies    Intolerances        VITALS:    Vital Signs Last 24 Hrs  T(C): 36.6 (26 Sep 2021 11:35), Max: 36.6 (26 Sep 2021 11:35)  T(F): 97.9 (26 Sep 2021 11:35), Max: 97.9 (26 Sep 2021 11:35)  HR: 76 (26 Sep 2021 11:35) (76 - 89)  BP: 112/66 (26 Sep 2021 11:35) (104/63 - 123/72)  BP(mean): --  RR: 18 (26 Sep 2021 11:35) (17 - 18)  SpO2: 97% (26 Sep 2021 11:35) (94% - 98%)    LABS:                          9.0    4.36  )-----------( 309      ( 26 Sep 2021 08:03 )             28.8       09-26    138  |  104  |  13  ----------------------------<  94  4.3   |  29  |  0.82    Ca    8.6      26 Sep 2021 08:03  Phos  4.3     09-26  Mg     1.9     09-26        CAPILLARY BLOOD GLUCOSE          PT/INR - ( 25 Sep 2021 08:46 )   PT: 13.9 sec;   INR: 1.20 ratio         PTT - ( 26 Sep 2021 08:03 )  PTT:119.4 sec    LOWER EXTREMITY PHYSICAL EXAM:    Vasular: DP/PT 0/4, B/L, CFT <3 seconds B/L, Temperature gradient warm to cool, B/L.   Neuro: Epicritic sensation diminished to the level of digits, B/L.  Musculoskeletal/Ortho: s/p R foot partial ray resection on 9/20  Skin: R foot surgical site proximal sutures intact, distal sutures dehisced with coagulated blood and possible hematoma. Surgical flaps are warm and viable, no erythema, no mal odor, no signs of acute infection. L foot is unremarkable for any signs of infection       RADIOLOGY & ADDITIONAL STUDIES:      EXAM:  XR FOOT COMP MIN 3 VIEWS RT                            PROCEDURE DATE:  09/20/2021          INTERPRETATION:  RIGHT foot    CLINICAL INFORMATION: Postoperative radiographs.    TECHNIQUE: AP,lateral and oblique views.    FINDINGS:    Status post transmetatarsal amputation of the first hallux. Remaining osseous joint structures intact..    IMPRESSION:    Post transmetatarsal amputation first hallux.    --- End of Report ---            JODI ANDREWS MD; Attending Radiologist  This document has been electronically signed. Sep 23 2021  8:12AM

## 2021-09-26 NOTE — PROGRESS NOTE ADULT - ASSESSMENT
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    Cellulitis of right lower leg and Acute OM of right hallux  - s/p right 1st partial ray resection.  Tolerated procedure well with no obvious cardiac complications  - Pain control  - MSSA bacteremia on admission BCx; repeat BCx from 9/16 are NGTD  - ID recommending CORBIN in the setting of TAVR and ICD.   - Discussed  with Dr. Quiles in [facility] re: ICD possible ICD extraction.    - Pt agreeable to transfer to [place] for transfer. awaiting bed  - Surgical pathology of right hallux: +acute Osteomyelitis, staph aureus   - Podiatry/wound care follow up  - Abx per ID.  Follow recs    Pulmonary embolism.   - Bilateral PE in 7/2020, on Xarelto   - stopped  Xarelto, on heparin  gtt pending CORBIN     Chronic systolic HF S/P ICD, Severe MS  - Echo not well visualized LV, severe Mitral stenosis, left atrial enlargement   - ICD interrogation completed, no shocks recorded, ICD functioning battery life 2.5 years; Vpaced <1%  - Continue Entresto   - cont lasix for now   - No signs of acute volume overload. Tolerating Room air  - Strict I/Os, daily weights  - Monitor and replete Lytes. Keep K > 4 and Mg > 2  - Unclear why on sotalol, continue for now.  Would need outpatient records.  Follows with Dr. Manoj Perez in [facility]  - chart even noted 9/26 pt with shock overnight seems AICD fired x 1, pt asymptomatic, denied chest pain other than feeling shock  - EKG no acute changes, - Troponin I: <--<.015  - Now transferred to [place] for CORBIN, will follow up for interrogation      HTN  - BP: 112/66 (09-26-21 @ 11:35) (104/63 - 123/72)  - continue sotalol with hold parameters  - continue entresto with hold parameters  - if bp remains soft hold lasix temporarily     - Monitor and replete lytes, keep K>4, Mg>2.  - All other medical needs as per primary team.  - Other cardiovascular workup will depend on clinical course.  - Will continue to follow.    Asha Padilla MS ANP, [facility]  Nurse Practitioner- Cardiology   Spectra #[phone]

## 2021-09-26 NOTE — PROVIDER CONTACT NOTE (CRITICAL VALUE NOTIFICATION) - ACTION/TREATMENT ORDERED:
MD notified will follow up with day team
Dr Nguyen made aware, no new orders given, will continue to monitor.
MD aware. No new orders at this time.
per md follow protocol no new orders for now.
next ptt at 8am.

## 2021-09-26 NOTE — CHART NOTE - NSCHARTNOTEFT_GEN_A_CORE
S:  Called by RN as pt endorsed feeling a "shock" in his chest and felt his defibrillator went off. Pt endorses he has never experienced this except when his defibrillator was implanted 20 years ago     O:      A/P: S:  Called by RN as pt endorsed feeling a "shock" in his chest and felt his defibrillator went off. Pt endorses he has never experienced this except when his defibrillator was implanted 20 years ago. Described as "bat hitting chest" but denies any chest pain, palpitations, diaphoresis, SOB at present.     O:  T(C): 36.5 (09-26-21 @ 00:11), Max: 36.5 (09-26-21 @ 00:11)  HR: 89 (09-26-21 @ 00:11) (81 - 96)  BP: 123/65 (09-26-21 @ 00:11) (98/59 - 123/72)  RR: 17 (09-26-21 @ 00:11) (17 - 17)  SpO2: 94% (09-26-21 @ 00:11) (94% - 97%)    GENERAL: patient appears well, no acute distress, conversant  LUNGS: clear to auscultation, no intercostal retractions  HEART: soft S1/S2, regular rate and rhythm, no murmurs noted  GASTROINTESTINAL: abdomen is soft, nontender, nondistended  NEUROLOGIC: awake, alert, oriented x3  PSYCHIATRIC: slightly anxious      12 Lead ECG:   Ventricular Rate 88 BPM  P-R Interval 164 ms  QRS Duration 128 ms  Q-T Interval 400 ms  QTC Calculation(Bazett) 484 ms  P Axis 59 degrees  R Axis 31 degrees  T Axis -48 degrees  Diagnosis Line sinus rhythm with premature supraventricular complexes  Left bundle branch block    A/P:  80 year old male with PMH pancreatic cancer (dx 3/2021, currently undergoing chemo), HTN, HLD, CHF (unknown EF), CAD s/p stents x2 (~15 years ago), defibrillator, TAVR (4/2021), bilateral PE (7/2021) on Xarelto, spinal stenosis, GERD, BPH, macular degeneration presents to the ED c/o R foot pain admitted for R hallux pressure injury and RLE cellulitis found to have MSSA bacteremia and acute OM of right hallux. Overnight sensation of chest shock as if defibrillator went off.    - Pt currently asymptomatic   - EKG obtained, very small TWI in II compared to previous EKG on 9/14. Otherwise EKG grossly unchanged from previous  - Check trop, CK, CKMB  - Cardiology following, will reassess in AM  - Will cont to monitor, RN to notify for any changes

## 2021-09-26 NOTE — PROGRESS NOTE ADULT - SUBJECTIVE AND OBJECTIVE BOX
[facility] -- Milo Thomas Grossman, [place], Arian Rand Sav[place], [place]: Office # [phone]    Follow Up:   Cardiac Optimization, Hx of ischemic CM s/p ICD, severe AS s/p TAVR    Subjective/Observations: Patient seen and examined. Patient awake, alert, resting comfortably in bed. denies any new complaints such as chest pain, SOB, dizziness. Events overnight noted. Tolerating room air.     REVIEW OF SYSTEMS: All review of systems is negative for eye, ENT, GI, , allergic, dermatologic, musculoskeletal and neurologic except as described above    PAST MEDICAL & SURGICAL HISTORY:  HTN (hypertension)    HLD (hyperlipidemia)    GERD (gastroesophageal reflux disease)    Cardiomyopathy    History of total hip replacement  left    History of laminectomy    ICD (implantable cardiac defibrillator) in place    Benign testicular tumor    History of hip replacement    Status post amputation of toe of right foot        MEDICATIONS  (STANDING):  ceFAZolin   IVPB 2000 milliGRAM(s) IV Intermittent every 8 hours  chlorhexidine 4% Liquid 1 Application(s) Topical <User Schedule>  cholecalciferol 400 Unit(s) Oral daily  dorzolamide 2%/timolol 0.5% Ophthalmic Solution 1 Drop(s) Both EYES two times a day  DULoxetine 60 milliGRAM(s) Oral daily  furosemide    Tablet 20 milliGRAM(s) Oral daily  gabapentin 300 milliGRAM(s) Oral two times a day  heparin  Infusion.  Unit(s)/Hr (18 mL/Hr) IV Continuous <Continuous>  ketorolac 0.5% Ophthalmic Solution 1 Drop(s) Both EYES daily  latanoprost 0.005% Ophthalmic Solution 1 Drop(s) Both EYES at bedtime  lidocaine   4% Patch 1 Patch Transdermal daily  melatonin 5 milliGRAM(s) Oral at bedtime  multivitamin 1 Tablet(s) Oral daily  pancrelipase  (CREON 36,000 Lipase Units) 2 Capsule(s) Oral three times a day with meals  pantoprazole    Tablet 40 milliGRAM(s) Oral before breakfast  pyridoxine 100 milliGRAM(s) Oral daily  sacubitril 49 mG/valsartan 51 mG 1 Tablet(s) Oral two times a day  sotalol 40 milliGRAM(s) Oral two times a day  tamsulosin 0.4 milliGRAM(s) Oral at bedtime    MEDICATIONS  (PRN):  acetaminophen   Tablet .. 650 milliGRAM(s) Oral every 6 hours PRN Temp greater or equal to 38C (100.4F), Mild Pain (1 - 3)  ALPRAZolam 0.25 milliGRAM(s) Oral at bedtime PRN anxiety  heparin   Injectable 8000 Unit(s) IV Push every 6 hours PRN For aPTT less than 40  heparin   Injectable 4000 Unit(s) IV Push every 6 hours PRN For aPTT between 40 - 57  oxycodone    5 mG/acetaminophen 325 mG 1 Tablet(s) Oral every 6 hours PRN Moderate Pain (4 - 6)  prochlorperazine   Tablet 10 milliGRAM(s) Oral daily PRN nausea or vomiting  sodium chloride 0.9% lock flush 10 milliLiter(s) IV Push every 1 hour PRN Pre/post blood products, medications, blood draw, and to maintain line patency    Allergies    No Known Allergies    Intolerances      Vital Signs Last 24 Hrs  T(C): 36.6 (26 Sep 2021 11:35), Max: 36.6 (26 Sep 2021 11:35)  T(F): 97.9 (26 Sep 2021 11:35), Max: 97.9 (26 Sep 2021 11:35)  HR: 76 (26 Sep 2021 11:35) (76 - 96)  BP: 112/66 (26 Sep 2021 11:35) (98/59 - 123/72)  BP(mean): --  RR: 18 (26 Sep 2021 11:35) (17 - 18)  SpO2: 97% (26 Sep 2021 11:35) (94% - 98%)  I&O's Summary    25 Sep 2021 07:01  -  26 Sep 2021 07:00  --------------------------------------------------------  IN: 200 mL / OUT: 1200 mL / NET: -1000 mL      Weight (kg): 99.3 (09-26 @ 11:35), 94.347 (09-26 @ 09:22)    TELE: Not on telemetry   PHYSICAL EXAM:  Appearance: NAD, no distress, alert, Well developed   HEENT: Moist Mucous Membranes, Anicteric  Cardiovascular: Regular rate and rhythm, Normal S1 S2, No JVD, No murmurs, No rubs, gallops or clicks  Respiratory: Non-labored, Clear to auscultation, No rales, No rhonchi, No wheezing.   Gastrointestinal:  Soft, Non-tender, + BS  Neurologic: Non-focal  Skin: Warm and dry, No visible rashes or ulcers, No ecchymosis, No cyanosis  Musculoskeletal: No clubbing, No cyanosis, No joint swelling/tenderness right foot wound intact dsg  Psychiatry: Mood & affect appropriate  Lymph: No peripheral edema.     LABS: All Labs Reviewed:                        9.0    4.36  )-----------( 309      ( 26 Sep 2021 08:03 )             28.8                         8.7    4.44  )-----------( 290      ( 25 Sep 2021 16:40 )             28.1                         8.8    3.39  )-----------( 286      ( 25 Sep 2021 08:46 )             27.9     26 Sep 2021 08:03    138    |  104    |  13     ----------------------------<  94     4.3     |  29     |  0.82   25 Sep 2021 08:46    138    |  105    |  12     ----------------------------<  91     4.8     |  30     |  0.77   24 Sep 2021 09:14    139    |  103    |  10     ----------------------------<  121    4.7     |  30     |  0.76     Ca    8.6        26 Sep 2021 08:03  Ca    8.8        25 Sep 2021 08:46  Ca    9.0        24 Sep 2021 09:14  Phos  4.3       26 Sep 2021 08:03  Mg     1.9       26 Sep 2021 08:03  Mg     2.1       24 Sep 2021 09:14      PT/INR - ( 25 Sep 2021 08:46 )   PT: 13.9 sec;   INR: 1.20 ratio         PTT - ( 26 Sep 2021 08:03 )  PTT:119.4 sec  Creatine Kinase, Serum: 19 U/L (09-26-21 @ 00:29)  Troponin I, Serum: <.015 ng/mL (09-26-21 @ 00:29)    12 Lead ECG:   Ventricular Rate 97 BPM  Atrial Rate 97 BPM  P-R Interval 130 ms  QRS Duration 122 ms  Q-T Interval 374 ms  QTC Calculation(Bazett) 474 m  P Axis 43 degrees  R Axis 35 degrees  T Axis 173 degrees  Diagnosis Line Normal sinus rhythm  Left bundle branch block  Confirmed by JAI RAND ([id number]) on 9/15/2021 10:39:54 AM (09-14-21 @ 20:29)    ra< from: Xray Foot AP + Lateral + Oblique, Right (09.20.21 @ 16:37) >  EXAM:  XR FOOT COMP MIN 3 VIEWS RT                        PROCEDURE DATE:  09/20/2021    INTERPRETATION:  RIGHT foot  CLINICAL INFORMATION: Postoperative radiographs.  TECHNIQUE: AP,lateral and oblique views.  FINDINGS:  Status post transmetatarsal amputation of the first hallux. Remaining osseous joint structures intact..  IMPRESSION:  Post transmetatarsal amputation first hallux.  --- End of Report ---  JODI ANDREWS MD; Attending Radiologist  This document has been electronically signed. Sep 23 2021  8:12AM    < end of copied text >  < from: TTE Echo Complete w/o Contrast w/ Doppler (09.18.21 @ 08:10) >   EXAM:  ECHO TTE WO CON COMP W DOPP    PROCEDURE DATE:  09/18/2021    INTERPRETATION:  INDICATION: Infectious endocarditis  Sonographer KL  Blood Pressure 108/71    Height 185.4 cm     Weight 97.5 kg       BSA 2.22 sq m  Dimensions:  LA 4.3       Normal Values: 2.0 - 4.0 cm  Ao 3.5        Normal Values: 2.0 - 3.8 cm  SEPTUM 1.3       Normal Values: 0.6 - 1.2 cm  PWT 1.2       Normal Values: 0.6 - 1.1 cm  LVIDd 4.9         Normal Values: 3.0 - 5.6 cm  LVIDs 4.3         Normal Values: 1.8 - 4.0 cm    OBSERVATIONS:  Technically difficult and very limited study  Mitral Valve: Mitral annular calcification and calcified leaflets with restricted opening. Mean transmitral valve gradient equals 10.6 mmHg which is consistent with severe mitral stenosis.  Mild MR.  Aortic Valve/Aorta: Patient has a history of bioprosthetic aortic valve replacement. Peak transaortic valve gradient 21 mmHg with a mean transaortic valve gradient of 11 mmHg. This is probably normal in the setting of a prosthetic valve  Tricuspid Valve: Not well-visualized  Pulmonic Valve: Not well-visualized  Left Atrium: Enlarged  Right Atrium: Not well-visualized  Left Ventricle: The left ventricular endocardium is not well-visualized. Unable to accurately assess left ventricular function. If clinically indicated can consider further imaging with echo contrast  Right Ventricle: The right ventricle is not well-visualized. A device wire is noted within the right heart  Pericardium: no significant pericardial effusion.  Pulmonary/RV Pressure: estimated PA systolic pressure of 39 mmHg    IMPRESSION:  Technically difficult and very limited study  The left ventricular endocardium is not well-visualized. Unable to accurately assess left ventricular function. If clinically indicated can consider further imaging with echo contrast  The right ventricle is not well-visualized. A device wire is noted within the right heart  Patient has a history of bioprosthetic aortic valve replacement. Peak transaortic valvegradient 21 mmHg with a mean transaortic valve gradient of 11 mmHg. This is probably normal in the setting of a prosthetic valve  Calcified mitral valve with restricted opening. Valve gradients are consistent with severe mitral stenosis  Mild MR  Left atrial enlargement  --- End of Report ---    TASHA RENDON MD; Attending Cardiologist  This document has been electronically signed. Sep 18 2021  8:43P  < end of copied text >

## 2021-09-26 NOTE — PROGRESS NOTE ADULT - REASON FOR ADMISSION
right hallux ulcer/cellulitis

## 2021-09-26 NOTE — PROVIDER CONTACT NOTE (CRITICAL VALUE NOTIFICATION) - SITUATION
Received call from Isaura at [facility] with tissue cx results taken 9/20/21 which resulted + rare pseudomonas aeruginosa. Cefazolin ivabt is in progress
Pt had second critical at 10:39 blood cultures growth in aerobic bottle gram positive cocci in clusters from test done 9/14 at 19:55
Patient has an APTT of 141.3
pt is on heparin at 15 cc/hr. PTT drawn and result is 138.5.

## 2021-09-26 NOTE — PROGRESS NOTE ADULT - PROVIDER SPECIALTY LIST ADULT
Cardiology
Hospitalist
Infectious Disease
Surgery
Cardiology
Hospitalist
Hospitalist
Infectious Disease
Surgery
Hospitalist
Podiatry
Surgery
Hospitalist
Podiatry
Hospitalist

## 2021-09-26 NOTE — H&P ADULT - NSHPPHYSICALEXAM_GEN_ALL_CORE
Vital Signs Last 24 Hrs  T(C): 36.6 (26 Sep 2021 11:35), Max: 36.6 (26 Sep 2021 11:35)  T(F): 97.9 (26 Sep 2021 11:35), Max: 97.9 (26 Sep 2021 11:35)  HR: 76 (26 Sep 2021 11:35) (76 - 96)  BP: 112/66 (26 Sep 2021 11:35) (98/59 - 123/72)  BP(mean): --  RR: 18 (26 Sep 2021 11:35) (17 - 18)  SpO2: 97% (26 Sep 2021 11:35) (94% - 98%)    CONSTITUTIONAL: Well-groomed, in no apparent distress  EYES: No conjunctival or scleral injection, non-icteric; PERRLA and symmetric  ENMT: No external nasal lesions; nasal mucosa not inflamed; normal dentition; no pharyngeal injection or exudates, oral mucosa with moist membranes  NECK: Trachea midline without palpable neck mass; thyroid not enlarged and non-tender  RESPIRATORY: Breathing comfortably; no dullness to percussion; lungs CTA without wheeze/rhonchi/rales  CARDIOVASCULAR: +S1S2, RRR, no M/G/R; no carotid bruits; pedal pulses full and symmetric; no lower extremity edema  GASTROINTESTINAL: No palpable masses or tenderness, +BS throughout, no rebound/guarding; no hepatosplenomegaly; no hernia palpated  LYMPHATIC: No cervical LAD or tenderness; no axillary LAD or tenderness; no inguinal LAD or tenderness  MUSCULOSKELETAL: Normal gait and station; no digital clubbing or cyanosis; no paraspinal tenderness; examination of the  (head/neck, spine/ribs/pelvis, RUE, LUE, RLE, LLE) without misalignment, normal strength and tone of extremities  SKIN: No rashes or ulcers noted; no subcutaneous nodules or induration palpable  NEUROLOGIC: CN II-XII intact; normal reflexes in upper and lower extremities; sensation intact in LEs b/l to light touch  PSYCHIATRIC: A+O x 3; mood and affect appropriate; appropriate insight and judgment Vital Signs Last 24 Hrs  T(C): 36.6 (26 Sep 2021 11:35), Max: 36.6 (26 Sep 2021 11:35)  T(F): 97.9 (26 Sep 2021 11:35), Max: 97.9 (26 Sep 2021 11:35)  HR: 76 (26 Sep 2021 11:35) (76 - 96)  BP: 112/66 (26 Sep 2021 11:35) (98/59 - 123/72)  BP(mean): --  RR: 18 (26 Sep 2021 11:35) (17 - 18)  SpO2: 97% (26 Sep 2021 11:35) (94% - 98%)    CONSTITUTIONAL: Well-groomed, in no apparent distress  EYES: No conjunctival or scleral injection, non-icteric; PERRLA and symmetric  ENMT: No external nasal lesions; nasal mucosa not inflamed; normal dentition; no pharyngeal injection or exudates, oral mucosa with moist membranes  NECK: Trachea midline without palpable neck mass; thyroid not enlarged and non-tender  RESPIRATORY: Breathing comfortably; no dullness to percussion; lungs CTA without wheeze/rhonchi/rales  CARDIOVASCULAR: +S1S2, RRR, no M/G/R; no carotid bruits; pedal pulses full and symmetric; no lower extremity edema  GASTROINTESTINAL: No palpable masses or tenderness, +BS throughout, no rebound/guarding; no hepatosplenomegaly; no hernia palpated  LYMPHATIC: No cervical LAD or tenderness; no axillary LAD or tenderness; no inguinal LAD or tenderness  MUSCULOSKELETAL: no digital clubbing or cyanosis; no paraspinal tenderness; R foot covered and double wrapped in ace bandages. Somewhat bloody, but not soaked near toe. Extremities warm.   SKIN: No rashes or ulcers noted; no subcutaneous nodules or induration palpable  NEUROLOGIC: CN II-XII intact; normal reflexes in upper and lower extremities; sensation intact in LEs b/l to light touch  PSYCHIATRIC: A+O x 3; mood and affect appropriate; appropriate insight and judgment

## 2021-09-26 NOTE — H&P ADULT - HISTORY OF PRESENT ILLNESS
Mr. 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 Mr. 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 Given recent TAVR, transfered [facility] for CORBIN and consideration of ICD extraction. At [place], S/p R. hallux amputation 9/20 and RRT 9/23 for ruptured hematoma @ surgical site during PT, wound irrigated and packed, left open to heal by secondary intention. Bcx 9/15 growing MSSA, with clearance on Bcx 9/16.  Mr. 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 Given recent TAVR, transfered [facility] for CORBIN and consideration of ICD extraction. At [place], S/p R. hallux amputation 9/20 and RRT 9/23 for ruptured hematoma @ surgical site during PT, wound irrigated and packed, left open to heal by secondary intention. Bcx 9/15 growing MSSA, with clearance on Bcx 9/16. Currently, pt feels well. Notes some numbness/soreness at amputation site but otherwise ROS negative.

## 2021-09-26 NOTE — CONSULT NOTE ADULT - ASSESSMENT
80M with R foot partial first ray resection dehiscence   - Pt evaluated bedside   - VSS, afebrile, no leukocytosis  - Exam:  R foot surgical site proximal sutures intact, distal sutures dehisced with coagulated blood and possible hematoma. Surgical flaps are warm and viable, no erythema, no mal odor, no signs of acute infection. L foot is unremarkable for any signs of infection   - Xrays: consistent with surgery   - 3-4cc of hematoma + coagulated blood evacuated from surgical site, area then flushed with 20cc of NS  - Surgical site is granular with no signs of infection   - Wound packed with 1/4 in iodoform packing to prevent further hematoma formation   - Wound care orders placed   - Please reconsult as necessary   - Discussed with attending

## 2021-09-26 NOTE — PROVIDER CONTACT NOTE (CRITICAL VALUE NOTIFICATION) - TEST AND RESULT REPORTED:
aptt 152.2
Blood Culture from 9/14 prelim results growth in aerobic bottle gram positive cocci in clusters
.5.
Tissue cx from 9/20/21 + rare pseudomonas aeruginosa
Elevated Aptt

## 2021-09-26 NOTE — H&P ADULT - PROBLEM SELECTOR PLAN 7
- Diagnosed in March 2021, on chemotherapy (does not recall name of medication)  - Continue home Pancrelipase 36,000 2 tabs PO TID with meals  - Per podiatry, Dr. López spoke with Dr. Murphy at [facility] to be postponed until after resolution of acute OM infection  -Pain meds as above

## 2021-09-26 NOTE — H&P ADULT - PROBLEM SELECTOR PLAN 2
Currently saturating well on Room air without evidence of acute volume overload.   - TTE 9/18 not well visualized LV, severe Mitral stenosis, left atrial enlargement   - F/U CORBIN tomorrow  - continue sotalol with hold parameters  - continue entresto with hold parameters  - continue lasix with hold parameters\  - Monitor and replete lytes, keep K>4, Mg>2.  - Strict Is/Os, daily weights.

## 2021-09-27 LAB
ANION GAP SERPL CALC-SCNC: 12 MMOL/L — SIGNIFICANT CHANGE UP (ref 5–17)
APTT BLD: 129.9 SEC — CRITICAL HIGH (ref 27.5–35.5)
APTT BLD: 69.6 SEC — HIGH (ref 27.5–35.5)
BUN SERPL-MCNC: 15 MG/DL — SIGNIFICANT CHANGE UP (ref 7–23)
CALCIUM SERPL-MCNC: 9.3 MG/DL — SIGNIFICANT CHANGE UP (ref 8.4–10.5)
CHLORIDE SERPL-SCNC: 99 MMOL/L — SIGNIFICANT CHANGE UP (ref 96–108)
CO2 SERPL-SCNC: 25 MMOL/L — SIGNIFICANT CHANGE UP (ref 22–31)
COVID-19 SPIKE DOMAIN AB INTERP: POSITIVE
COVID-19 SPIKE DOMAIN ANTIBODY RESULT: 17.8 U/ML — HIGH
CREAT SERPL-MCNC: 0.81 MG/DL — SIGNIFICANT CHANGE UP (ref 0.5–1.3)
GLUCOSE SERPL-MCNC: 91 MG/DL — SIGNIFICANT CHANGE UP (ref 70–99)
HCT VFR BLD CALC: 28.8 % — LOW (ref 39–50)
HCT VFR BLD CALC: 29.2 % — LOW (ref 39–50)
HGB BLD-MCNC: 9.1 G/DL — LOW (ref 13–17)
HGB BLD-MCNC: 9.1 G/DL — LOW (ref 13–17)
INR BLD: 1.12 RATIO — SIGNIFICANT CHANGE UP (ref 0.88–1.16)
MAGNESIUM SERPL-MCNC: 1.8 MG/DL — SIGNIFICANT CHANGE UP (ref 1.6–2.6)
MCHC RBC-ENTMCNC: 31.2 GM/DL — LOW (ref 32–36)
MCHC RBC-ENTMCNC: 31.6 GM/DL — LOW (ref 32–36)
MCHC RBC-ENTMCNC: 32.7 PG — SIGNIFICANT CHANGE UP (ref 27–34)
MCHC RBC-ENTMCNC: 33 PG — SIGNIFICANT CHANGE UP (ref 27–34)
MCV RBC AUTO: 104.3 FL — HIGH (ref 80–100)
MCV RBC AUTO: 105 FL — HIGH (ref 80–100)
NRBC # BLD: 0 /100 WBCS — SIGNIFICANT CHANGE UP (ref 0–0)
NRBC # BLD: 0 /100 WBCS — SIGNIFICANT CHANGE UP (ref 0–0)
PHOSPHATE SERPL-MCNC: 4.1 MG/DL — SIGNIFICANT CHANGE UP (ref 2.5–4.5)
PLATELET # BLD AUTO: 338 K/UL — SIGNIFICANT CHANGE UP (ref 150–400)
PLATELET # BLD AUTO: 347 K/UL — SIGNIFICANT CHANGE UP (ref 150–400)
POTASSIUM SERPL-MCNC: 4.3 MMOL/L — SIGNIFICANT CHANGE UP (ref 3.5–5.3)
POTASSIUM SERPL-SCNC: 4.3 MMOL/L — SIGNIFICANT CHANGE UP (ref 3.5–5.3)
PROTHROM AB SERPL-ACNC: 13.4 SEC — SIGNIFICANT CHANGE UP (ref 10.6–13.6)
RBC # BLD: 2.76 M/UL — LOW (ref 4.2–5.8)
RBC # BLD: 2.78 M/UL — LOW (ref 4.2–5.8)
RBC # FLD: 15.1 % — HIGH (ref 10.3–14.5)
RBC # FLD: 15.4 % — HIGH (ref 10.3–14.5)
SARS-COV-2 IGG+IGM SERPL QL IA: 17.8 U/ML — HIGH
SARS-COV-2 IGG+IGM SERPL QL IA: POSITIVE
SARS-COV-2 RNA SPEC QL NAA+PROBE: SIGNIFICANT CHANGE UP
SODIUM SERPL-SCNC: 136 MMOL/L — SIGNIFICANT CHANGE UP (ref 135–145)
WBC # BLD: 3.64 K/UL — LOW (ref 3.8–10.5)
WBC # BLD: 4.15 K/UL — SIGNIFICANT CHANGE UP (ref 3.8–10.5)
WBC # FLD AUTO: 3.64 K/UL — LOW (ref 3.8–10.5)
WBC # FLD AUTO: 4.15 K/UL — SIGNIFICANT CHANGE UP (ref 3.8–10.5)

## 2021-09-27 PROCEDURE — 93325 DOPPLER ECHO COLOR FLOW MAPG: CPT | Mod: 26

## 2021-09-27 PROCEDURE — 99223 1ST HOSP IP/OBS HIGH 75: CPT | Mod: 57

## 2021-09-27 PROCEDURE — 93312 ECHO TRANSESOPHAGEAL: CPT | Mod: 26

## 2021-09-27 PROCEDURE — 99222 1ST HOSP IP/OBS MODERATE 55: CPT

## 2021-09-27 PROCEDURE — 93320 DOPPLER ECHO COMPLETE: CPT | Mod: 26

## 2021-09-27 PROCEDURE — 99223 1ST HOSP IP/OBS HIGH 75: CPT

## 2021-09-27 PROCEDURE — 99233 SBSQ HOSP IP/OBS HIGH 50: CPT | Mod: GC

## 2021-09-27 RX ORDER — HEPARIN SODIUM 5000 [USP'U]/ML
INJECTION INTRAVENOUS; SUBCUTANEOUS
Qty: 25000 | Refills: 0 | Status: DISCONTINUED | OUTPATIENT
Start: 2021-09-27 | End: 2021-09-27

## 2021-09-27 RX ORDER — CHLORHEXIDINE GLUCONATE 213 G/1000ML
1 SOLUTION TOPICAL DAILY
Refills: 0 | Status: DISCONTINUED | OUTPATIENT
Start: 2021-09-27 | End: 2021-09-28

## 2021-09-27 RX ORDER — FUROSEMIDE 40 MG
20 TABLET ORAL DAILY
Refills: 0 | Status: DISCONTINUED | OUTPATIENT
Start: 2021-09-28 | End: 2021-09-28

## 2021-09-27 RX ORDER — HEPARIN SODIUM 5000 [USP'U]/ML
1000 INJECTION INTRAVENOUS; SUBCUTANEOUS
Qty: 25000 | Refills: 0 | Status: DISCONTINUED | OUTPATIENT
Start: 2021-09-27 | End: 2021-09-28

## 2021-09-27 RX ORDER — MAGNESIUM SULFATE 500 MG/ML
1 VIAL (ML) INJECTION ONCE
Refills: 0 | Status: COMPLETED | OUTPATIENT
Start: 2021-09-27 | End: 2021-09-27

## 2021-09-27 RX ADMIN — Medication 100 GRAM(S): at 15:07

## 2021-09-27 RX ADMIN — Medication 100 MILLIGRAM(S): at 05:09

## 2021-09-27 RX ADMIN — Medication 1 TABLET(S): at 12:07

## 2021-09-27 RX ADMIN — DORZOLAMIDE HYDROCHLORIDE TIMOLOL MALEATE 1 DROP(S): 20; 5 SOLUTION/ DROPS OPHTHALMIC at 05:10

## 2021-09-27 RX ADMIN — GABAPENTIN 300 MILLIGRAM(S): 400 CAPSULE ORAL at 05:10

## 2021-09-27 RX ADMIN — HEPARIN SODIUM 1000 UNIT(S)/HR: 5000 INJECTION INTRAVENOUS; SUBCUTANEOUS at 12:06

## 2021-09-27 RX ADMIN — SACUBITRIL AND VALSARTAN 1 TABLET(S): 24; 26 TABLET, FILM COATED ORAL at 17:45

## 2021-09-27 RX ADMIN — Medication 1 DROP(S): at 12:08

## 2021-09-27 RX ADMIN — TAMSULOSIN HYDROCHLORIDE 0.4 MILLIGRAM(S): 0.4 CAPSULE ORAL at 21:09

## 2021-09-27 RX ADMIN — Medication 40 MILLIGRAM(S): at 05:12

## 2021-09-27 RX ADMIN — Medication 2 CAPSULE(S): at 16:13

## 2021-09-27 RX ADMIN — Medication 100 MILLIGRAM(S): at 13:20

## 2021-09-27 RX ADMIN — Medication 650 MILLIGRAM(S): at 12:06

## 2021-09-27 RX ADMIN — HEPARIN SODIUM 1000 UNIT(S)/HR: 5000 INJECTION INTRAVENOUS; SUBCUTANEOUS at 03:32

## 2021-09-27 RX ADMIN — Medication 40 MILLIGRAM(S): at 17:45

## 2021-09-27 RX ADMIN — Medication 6 MILLIGRAM(S): at 21:09

## 2021-09-27 RX ADMIN — Medication 100 MILLIGRAM(S): at 12:08

## 2021-09-27 RX ADMIN — HEPARIN SODIUM 0 UNIT(S)/HR: 5000 INJECTION INTRAVENOUS; SUBCUTANEOUS at 19:39

## 2021-09-27 RX ADMIN — Medication 100 MILLIGRAM(S): at 21:10

## 2021-09-27 RX ADMIN — Medication 650 MILLIGRAM(S): at 08:35

## 2021-09-27 RX ADMIN — GABAPENTIN 300 MILLIGRAM(S): 400 CAPSULE ORAL at 17:46

## 2021-09-27 RX ADMIN — Medication 20 MILLIGRAM(S): at 05:10

## 2021-09-27 RX ADMIN — LATANOPROST 1 DROP(S): 0.05 SOLUTION/ DROPS OPHTHALMIC; TOPICAL at 21:09

## 2021-09-27 RX ADMIN — CHLORHEXIDINE GLUCONATE 1 APPLICATION(S): 213 SOLUTION TOPICAL at 12:09

## 2021-09-27 RX ADMIN — DULOXETINE HYDROCHLORIDE 60 MILLIGRAM(S): 30 CAPSULE, DELAYED RELEASE ORAL at 12:07

## 2021-09-27 RX ADMIN — DORZOLAMIDE HYDROCHLORIDE TIMOLOL MALEATE 1 DROP(S): 20; 5 SOLUTION/ DROPS OPHTHALMIC at 17:46

## 2021-09-27 RX ADMIN — Medication 2 CAPSULE(S): at 12:08

## 2021-09-27 RX ADMIN — PANTOPRAZOLE SODIUM 40 MILLIGRAM(S): 20 TABLET, DELAYED RELEASE ORAL at 05:10

## 2021-09-27 NOTE — PROGRESS NOTE ADULT - PROBLEM SELECTOR PLAN 4
Likely anemia of chronic disease in setting of pancreatic cancer  - H/h stable  - Currently hemodynamically stable, monitor for signs and symptoms of active bleeding especially while on therapeutic AC  - Transfuse for hemoglobin <8 Likely anemia of chronic disease in setting of pancreatic cancer  - H/h stable  - Currently hemodynamically stable, monitor for signs and symptoms of active bleeding especially while on therapeutic AC  - Transfuse for hemoglobin <8  -Maintain active type and screen

## 2021-09-27 NOTE — PROGRESS NOTE ADULT - PROBLEM SELECTOR PLAN 7
- Diagnosed in March 2021, on chemotherapy (does not recall name of medication)  - Continue home Pancrelipase 36,000 2 tabs PO TID with meals  - Per podiatry, Dr. López spoke with Dr. Murphy at [facility] to be postponed until after resolution of acute OM infection  -Pain meds as above

## 2021-09-27 NOTE — SWALLOW BEDSIDE ASSESSMENT ADULT - COMMENTS
Patient with complaints of coughing/choking on food on 9/18; - S/S consulted at OSH and placed on dysphagia diet. Per order notes, patient says he was told he could have regular/thin at OSH. Team requesting re-eval for upgrade. Patient with complaints of coughing/choking on food on 9/18; - S/S consulted at OSH and placed on dysphagia diet. Per order notes, patient says he was told he could have regular/thin at OSH. Team requesting re-eval for upgrade.  9/27: npo for CORBIN; completed, awaiting return to unit. Ok for PO trials per MD

## 2021-09-27 NOTE — CONSULT NOTE ADULT - ATTENDING COMMENTS
80M PMH of pancreatic cancer (3/2021), HTN, HLD, CHF, CAD s/p stents, ICD, TAVR, b/l PE, spinal stenosis s/p dylon placement, GERD, BPH, L. hip replacement x3, presenting with R. hallux OM s/p amputation.     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   s/p R hallux amputation on 9/20  Operative Cultures with CoNS and Pseudomonas but pathology clear  Unclear if these cultures are dirty bone or clean bone  However, in light of negative path and stable exam would not cover for the Pseudomonas for now    Concerning context for MSSA Bacteremia given mediport, AICD and TAVR  CORBIN without vegetations  Planned for AICD removal by EPS  Given risk for seeding would also remove mediport  Would treat for at least 4 weeks of ancef from negative BCx for MSSA Bacteremia    Overall, MSSA Bacteremia, Toe OM, Positive Culture Finding (Pseudomonas), AICD in place, Mediport in place, s/p TAVR    I will continue to follow. Please feel free to contact me with any further questions.    Sanjay Andres M.D.  [facility] Division of Infectious Disease  8AM-5PM: Pager Number [phone]  After Hours (or if no response): Please contact the Infectious Diseases Office at [phone]     The above assessment and plan were discussed with medicine team

## 2021-09-27 NOTE — PROGRESS NOTE ADULT - ASSESSMENT
80M with R foot partial first ray resection dehiscence   - Pt seen and evaluated   - VSS, afebrile, no leukocytosis  - R foot surgical site proximal sutures intact, distal sutures dehisced, no further hematoma. Surgical flaps are warm and viable, no erythema, no malodor, no signs of acute infection. L foot is unremarkable for any signs of infection   - Surgical site is granular with no signs of infection   - Wound packed with 1/4 in iodoform packing to prevent further hematoma formation   - Wound care orders placed   - Pod stable for discharge, info in discharge paperwork  - Seen with attending

## 2021-09-27 NOTE — PROGRESS NOTE ADULT - PROBLEM SELECTOR PLAN 1
With RLE cellulitis  - Initial blood cultures 9/15 positive for MSSA bacteremia  - Repeat BCx from 9/16 NGTD  - S/p R. hallux amputation 9/20  Surgical pathology of right hallux positive for acute osteomyelitis, MSSA   - Continue cefazolin 2g q8h per ID recs at [place]. Plan is to continue 4-6 weeks abx depending on result of CORBIN (as late as 10/27)  - S/P PICC line placement 9/22 RUE  - Pain well controlled w/ current regimen   - S/p RRT 9/23 for ruptured hematoma @ surgical site during PT, wound irrigated and packed, left open to heal by secondary intention  - Outpatient podiatrist dr Felipe. Podiatry reconsulted. Will f/u recs.   - Cardiology/EP following. Plan for CORBIN 9/27 to assess for Vegetations on ICD. If any concern for vegetations will plan for ICD retrieval. With RLE cellulitis  - Initial blood cultures 9/15 positive for MSSA bacteremia  - Repeat BCx from 9/16 NGTD  - S/p R. hallux amputation 9/20  Surgical pathology of right hallux positive for acute osteomyelitis, MSSA   - Continue cefazolin 2g q8h per ID recs at [place]. Plan is to continue 4-6 weeks abx depending on result of CORBIN (as late as 10/27)  - S/P PICC line placement 9/22 RUE  - Pain well controlled w/ current regimen   - S/p RRT 9/23 for ruptured hematoma @ surgical site during PT, wound irrigated and packed, left open to heal by secondary intention  - Outpatient podiatrist dr Felipe. Podiatry reconsulted. Will f/u recs.   - Cardiology/EP following. CORBIN performed today without evidence of vegetations. Plan for ICD extraction Wednesday AM. Will send COVID swab today, maintain active T&S, hold heparin midnight night before and NPO after midnight tomorrow night.   -Id following. Will appreciate further recs.

## 2021-09-27 NOTE — CONSULT NOTE ADULT - SUBJECTIVE AND OBJECTIVE BOX
HISTORY OF PRESENT ILLNESS:  Mr. Gatica is a 80 year old male with PMH pancreatic cancer (dx 3/2021, currently undergoing chemo), HTN, HLD, CHFrEF, CAD s/p stents x2 (~15 years ago), Velazquez ICD, TAVR (4/2021), bilateral PE (7/2021) on Xarelto, spinal stenosis, GERD, BPH, macular degeneration initially presented to [facility] c/o R foot pain admitted for R hallux pressure injury and RLE cellulitis found to have MSSA bacteremia and acute OM of right hallux. Given recent TAVR, transfered [facility] for CORBIN and consideration of ICD extraction. At [place], S/p R. hallux amputation 9/20 and RRT 9/23 for ruptured hematoma @ surgical site during PT, wound irrigated and packed, left open to heal by secondary intention. Bcx 9/15 growing MSSA, with clearance on Bcx 9/16. Currently, pt feels well. Notes some numbness/soreness at amputation site but otherwise ROS negative.     On device interrogation, pt received ATPx4 and 1 shock for "VT" s/p conversion to sinus rhythm. EGM consistent with pAT. Pt states he was resting prior to the episode. Denies any symptoms of palpitaitons, SOB, dizziness, chest pain; denies any symptoms post shock. Pt states he has never received shocks post implant.       Allergies  No Known Allergies    MEDICATIONS:  heparin   Injectable 8000 Unit(s) IV Push every 6 hours PRN  heparin   Injectable 4000 Unit(s) IV Push every 6 hours PRN  sacubitril 49 mG/valsartan 51 mG 1 Tablet(s) Oral two times a day  sotalol 40 milliGRAM(s) Oral two times a day  tamsulosin 0.4 milliGRAM(s) Oral at bedtime  cefAZolin   IVPB 2000 milliGRAM(s) IV Intermittent every 8 hours  acetaminophen   Tablet .. 650 milliGRAM(s) Oral every 6 hours PRN  ALPRAZolam 0.25 milliGRAM(s) Oral three times a day PRN  DULoxetine 60 milliGRAM(s) Oral daily  gabapentin 300 milliGRAM(s) Oral two times a day  melatonin 6 milliGRAM(s) Oral at bedtime  oxycodone    5 mG/acetaminophen 325 mG 1 Tablet(s) Oral every 6 hours PRN  pancrelipase  (CREON 36,000 Lipase Units) 2 Capsule(s) Oral three times a day with meals  pantoprazole    Tablet 40 milliGRAM(s) Oral before breakfast  chlorhexidine 4% Liquid 1 Application(s) Topical daily  dorzolamide 2%/timolol 0.5% Ophthalmic Solution 1 Drop(s) Both EYES two times a day  influenza   Vaccine 0.5 milliLiter(s) IntraMuscular once  ketorolac 0.5% Ophthalmic Solution 1 Drop(s) Both EYES daily  latanoprost 0.005% Ophthalmic Solution 1 Drop(s) Both EYES at bedtime  multivitamin 1 Tablet(s) Oral daily  pyridoxine 100 milliGRAM(s) Oral daily      PAST MEDICAL & SURGICAL HISTORY:  HTN (hypertension)  HLD (hyperlipidemia)  GERD (gastroesophageal reflux disease)  Cardiomyopathy  History of total hip replacement  left    History of laminectomy  ICD (implantable cardiac defibrillator) in place  Benign testicular tumor  History of hip replacement  Status post amputation of toe of right foot      FAMILY HISTORY:  Family history of stroke (Mother)        REVIEW OF SYSTEMS:  See HPI. Otherwise, 10 point ROS done and otherwise negative.    PHYSICAL EXAM:  T(C): 36.3 (09-27-21 @ 04:00), Max: 36.6 (09-26-21 @ 11:35)  HR: 79 (09-27-21 @ 04:00) (76 - 83)  BP: 92/54 (09-27-21 @ 04:00) (92/54 - 112/66)  RR: 18 (09-27-21 @ 04:00) (18 - 19)  SpO2: 93% (09-27-21 @ 04:00) (93% - 97%)  Wt(kg): --  I&O's Summary    26 Sep 2021 07:01  -  27 Sep 2021 07:00  --------------------------------------------------------  IN: 240 mL / OUT: 3200 mL / NET: -2960 mL        Appearance: Normal	  HEENT:  Atraumatic	  Cardiovascular: Normal S1 S2, No JVD, No murmurs  Respiratory: Lungs clear to auscultation	  Psychiatry: A & O x 3, Mood & affect appropriate  Gastrointestinal:  Soft, Non-tender  Neurologic: Non-focal  Extremities: RLE wrapped. LLE no edema.      LABS:	 	    CBC Full  -  ( 27 Sep 2021 07:24 )  WBC Count : 3.64 K/uL  Hemoglobin : 9.1 g/dL  Hematocrit : 29.2 %  Platelet Count - Automated : 347 K/uL  Mean Cell Volume : 105.0 fl  Mean Cell Hemoglobin : 32.7 pg  Mean Cell Hemoglobin Concentration : 31.2 gm/dL  Auto Neutrophil # : x  Auto Lymphocyte # : x  Auto Monocyte # : x  Auto Eosinophil # : x  Auto Basophil # : x  Auto Neutrophil % : x  Auto Lymphocyte % : x  Auto Monocyte % : x  Auto Eosinophil % : x  Auto Basophil % : x    09-27    136  |  99  |  15  ----------------------------<  91  4.3   |  25  |  0.81  09-26    138  |  104  |  13  ----------------------------<  94  4.3   |  29  |  0.82    Ca    9.3      27 Sep 2021 07:24  Ca    8.6      26 Sep 2021 08:03  Phos  4.1     09-27  Phos  4.3     09-26  Mg     1.8     09-27  Mg     1.9     09-26        TELEMETRY: Sinus rhythm 70-90s. 2 brief episodes of paroxysmal AT, longest ~3.5 secs.     ECG:  Sinus rhythm at 88bpm. LBBB	    PREVIOUS DIAGNOSTIC TESTING:    [ ] Echocardiogram: < from: TTE Echo Complete w/o Contrast w/ Doppler (09.18.21 @ 08:10) >  Technically difficult and very limited study  Mitral Valve: Mitral annular calcification and calcified leaflets with restricted opening. Mean transmitral valve gradient equals 10.6 mmHg which is consistent with severe mitral stenosis.  Mild MR.  Aortic Valve/Aorta: Patient has a history of bioprosthetic aortic valve replacement. Peak transaortic valve gradient 21 mmHg with a mean transaortic valve gradient of 11 mmHg. This is probably normal in the setting of a prosthetic valve  Tricuspid Valve: Not well-visualized  Pulmonic Valve: Not well-visualized  Left Atrium: Enlarged  Right Atrium: Not well-visualized  Left Ventricle: The left ventricular endocardium is not well-visualized. Unable to accurately assess left ventricular function. If clinically indicated can consider further imaging with echo contrast  Right Ventricle: The right ventricle is not well-visualized. A device wire is noted within the right heart  Pericardium: no significant pericardial effusion.  Pulmonary/RV Pressure: estimated PA systolic pressure of 39 mmHg        IMPRESSION:  Technically difficult and very limited study  The left ventricular endocardium is not well-visualized. Unable to accurately assess left ventricular function. If clinically indicated can consider further imaging with echo contrast  The right ventricle is not well-visualized. A device wire is noted within the right heart  Patient has a history of bioprosthetic aortic valve replacement. Peak transaortic valvegradient 21 mmHg with a mean transaortic valve gradient of 11 mmHg. This is probably normal in the setting of a prosthetic valve  Calcified mitral valve with restricted opening. Valve gradients are consistent with severe mitral stenosis  Mild MR  Left atrial enlargement      < end of copied text >     HISTORY OF PRESENT ILLNESS:  Mr. Gatica is a 80 year old male with PMH pancreatic cancer (dx 3/2021, currently undergoing chemo), HTN, HLD, CHFrEF, CAD s/p stents x2 (~15 years ago), Velazquez ICD, TAVR (4/2021), bilateral PE (7/2021) on Xarelto, spinal stenosis, GERD, BPH, macular degeneration initially presented to [facility] c/o R foot pain admitted for R hallux pressure injury and RLE cellulitis found to have MSSA bacteremia and acute OM of right hallux. Given recent TAVR, transfered [facility] for CORBIN and consideration of ICD extraction. At [place], S/p R. hallux amputation 9/20 and RRT 9/23 for ruptured hematoma @ surgical site during PT, wound irrigated and packed, left open to heal by secondary intention. Bcx 9/15 growing MSSA, with clearance on Bcx 9/16. Currently, pt feels well. Notes some numbness/soreness at amputation site but otherwise ROS negative.     On device interrogation, pt received ATPx4 and 1 shock for "VT" s/p conversion to sinus rhythm. EGM consistent with pAT. Pt states he was resting prior to the episode. Denies any symptoms of palpitaitons, SOB, dizziness, chest pain; denies any symptoms post shock. Pt states he has never received shocks post implant.       Allergies  No Known Allergies    MEDICATIONS:  heparin   Injectable 8000 Unit(s) IV Push every 6 hours PRN  heparin   Injectable 4000 Unit(s) IV Push every 6 hours PRN  sacubitril 49 mG/valsartan 51 mG 1 Tablet(s) Oral two times a day  sotalol 40 milliGRAM(s) Oral two times a day  tamsulosin 0.4 milliGRAM(s) Oral at bedtime  cefAZolin   IVPB 2000 milliGRAM(s) IV Intermittent every 8 hours  acetaminophen   Tablet .. 650 milliGRAM(s) Oral every 6 hours PRN  ALPRAZolam 0.25 milliGRAM(s) Oral three times a day PRN  DULoxetine 60 milliGRAM(s) Oral daily  gabapentin 300 milliGRAM(s) Oral two times a day  melatonin 6 milliGRAM(s) Oral at bedtime  oxycodone    5 mG/acetaminophen 325 mG 1 Tablet(s) Oral every 6 hours PRN  pancrelipase  (CREON 36,000 Lipase Units) 2 Capsule(s) Oral three times a day with meals  pantoprazole    Tablet 40 milliGRAM(s) Oral before breakfast  chlorhexidine 4% Liquid 1 Application(s) Topical daily  dorzolamide 2%/timolol 0.5% Ophthalmic Solution 1 Drop(s) Both EYES two times a day  influenza   Vaccine 0.5 milliLiter(s) IntraMuscular once  ketorolac 0.5% Ophthalmic Solution 1 Drop(s) Both EYES daily  latanoprost 0.005% Ophthalmic Solution 1 Drop(s) Both EYES at bedtime  multivitamin 1 Tablet(s) Oral daily  pyridoxine 100 milliGRAM(s) Oral daily      PAST MEDICAL & SURGICAL HISTORY:  HTN (hypertension)  HLD (hyperlipidemia)  GERD (gastroesophageal reflux disease)  Cardiomyopathy  History of total hip replacement  left    History of laminectomy  ICD (implantable cardiac defibrillator) in place  Benign testicular tumor  History of hip replacement  Status post amputation of toe of right foot      FAMILY HISTORY:  Family history of stroke (Mother)        REVIEW OF SYSTEMS:  See HPI. Otherwise, 10 point ROS done and otherwise negative.    PHYSICAL EXAM:  T(C): 36.3 (09-27-21 @ 04:00), Max: 36.6 (09-26-21 @ 11:35)  HR: 79 (09-27-21 @ 04:00) (76 - 83)  BP: 92/54 (09-27-21 @ 04:00) (92/54 - 112/66)  RR: 18 (09-27-21 @ 04:00) (18 - 19)  SpO2: 93% (09-27-21 @ 04:00) (93% - 97%)  Wt(kg): --  I&O's Summary    26 Sep 2021 07:01  -  27 Sep 2021 07:00  --------------------------------------------------------  IN: 240 mL / OUT: 3200 mL / NET: -2960 mL        Appearance: Normal	  HEENT:  Atraumatic	  Cardiovascular: Normal S1 S2, No JVD, No murmurs  Respiratory: Lungs clear to auscultation	  Psychiatry: A & O x 3, Mood & affect appropriate  Gastrointestinal:  Soft, Non-tender  Neurologic: Non-focal  Extremities: RLE wrapped. LLE no edema.      LABS:	 	    CBC Full  -  ( 27 Sep 2021 07:24 )  WBC Count : 3.64 K/uL  Hemoglobin : 9.1 g/dL  Hematocrit : 29.2 %  Platelet Count - Automated : 347 K/uL  Mean Cell Volume : 105.0 fl  Mean Cell Hemoglobin : 32.7 pg  Mean Cell Hemoglobin Concentration : 31.2 gm/dL  Auto Neutrophil # : x  Auto Lymphocyte # : x  Auto Monocyte # : x  Auto Eosinophil # : x  Auto Basophil # : x  Auto Neutrophil % : x  Auto Lymphocyte % : x  Auto Monocyte % : x  Auto Eosinophil % : x  Auto Basophil % : x    09-27    136  |  99  |  15  ----------------------------<  91  4.3   |  25  |  0.81  09-26    138  |  104  |  13  ----------------------------<  94  4.3   |  29  |  0.82    Ca    9.3      27 Sep 2021 07:24  Ca    8.6      26 Sep 2021 08:03  Phos  4.1     09-27  Phos  4.3     09-26  Mg     1.8     09-27  Mg     1.9     09-26        TELEMETRY: Sinus rhythm 70-90s. 2 brief episodes of paroxysmal AT, longest ~3.5 secs.     ECG:  Sinus rhythm at 88bpm. LBBB	    PREVIOUS DIAGNOSTIC TESTING:    [ ] Echocardiogram: < from: TTE Echo Complete w/o Contrast w/ Doppler (09.18.21 @ 08:10) >  Technically difficult and very limited study  Mitral Valve: Mitral annular calcification and calcified leaflets with restricted opening. Mean transmitral valve gradient equals 10.6 mmHg which is consistent with severe mitral stenosis.  Mild MR.  Aortic Valve/Aorta: Patient has a history of bioprosthetic aortic valve replacement. Peak transaortic valve gradient 21 mmHg with a mean transaortic valve gradient of 11 mmHg. This is probably normal in the setting of a prosthetic valve  Tricuspid Valve: Not well-visualized  Pulmonic Valve: Not well-visualized  Left Atrium: Enlarged  Right Atrium: Not well-visualized  Left Ventricle: The left ventricular endocardium is not well-visualized. Unable to accurately assess left ventricular function. If clinically indicated can consider further imaging with echo contrast  Right Ventricle: The right ventricle is not well-visualized. A device wire is noted within the right heart  Pericardium: no significant pericardial effusion.  Pulmonary/RV Pressure: estimated PA systolic pressure of 39 mmHg        IMPRESSION:  Technically difficult and very limited study  The left ventricular endocardium is not well-visualized. Unable to accurately assess left ventricular function. If clinically indicated can consider further imaging with echo contrast  The right ventricle is not well-visualized. A device wire is noted within the right heart  Patient has a history of bioprosthetic aortic valve replacement. Peak transaortic valvegradient 21 mmHg with a mean transaortic valve gradient of 11 mmHg. This is probably normal in the setting of a prosthetic valve  Calcified mitral valve with restricted opening. Valve gradients are consistent with severe mitral stenosis  Mild MR  Left atrial enlargement      < end of copied text >    CORBIN:   < from: Transesophageal Echocardiogram w/o TTE (09.27.21 @ 09:25) >  Observations:  Mitral Valve: Severe mitral annular and leaflet  calcification.  Moderate mitral stenosis. Mean gradient 5.8 mmHg at  68/min.  Mild-moderate mitral regurgitation.  Aortic Valve/Aorta: s/p transcatheter aortic valve  replacement. Leaflet motion is normal.  No aortic valvular or paravalvular regurgitation seen.  Normal size aortic root, arch, and descending thoracic  aorta.  Mild atherosclerosis. No mobile plaque.  Left Atrium: Severe left atrial enlargement.  Left Ventricle: Moderate left ventricular enlargement.  Estimated ejection fraction 30-35%.  Right Heart: Moderate right atrial enlargement. Prominent  Chiari network. Normal right ventricular size and function.    A device wire is noted in the right heart.  Normal tricuspid valve. Mild tricuspid regurgitation.  Normal pulmonic valve. Minimal pulmonic regurgitation.  Pericardium/Pleura: Normal pericardium with no pericardial  effusion.  Hemodynamic: Mild pulmonary hypertension.  Color Doppler doemonstratesthe presence of a patent  foramen ovale.  ------------------------------------------------------------------------  Conclusions:  Moderate left ventricular enlargement. Estimated ejection  fraction 30-35%.  Moderate mitral stenosis due to annular andleaflet  calcification. Mild-moderate mitral regurgitation.  s/p transcatheter aortic valve replacement. Leaflet motion  is normal. No aortic valvular or paravalvular regurgitation  seen.  A device wire is noted in the right heart.  No vegetations seen.    < end of copied text >

## 2021-09-27 NOTE — SWALLOW BEDSIDE ASSESSMENT ADULT - SLP PERTINENT HISTORY OF CURRENT PROBLEM
80 year old male with PMH pancreatic cancer (dx 3/2021, currently undergoing chemo), HTN, HLD, CHF (unknown EF), CAD s/p stents x2 (~15 years ago), defibrillator, TAVR (4/2021), bilateral PE (7/2021) on Xarelto, spinal stenosis, GERD, BPH, macular degeneration initially presented to [facility] c/o R foot pain admitted for R hallux pressure injury and RLE cellulitis found to have MSSA bacteremia and acute OM of right hallux. Given recent TAVR, transfered [facility] for CORBIN and consideration of ICD extraction. At [place], S/p R. hallux amputation 9/20 and RRT 9/23 for ruptured hematoma @ surgical site during PT, wound irrigated and packed, left open to heal by secondary intention. Bcx 9/15 growing MSSA, with clearance on Bcx 9/16.

## 2021-09-27 NOTE — PROGRESS NOTE ADULT - PROBLEM SELECTOR PLAN 8
- Patient with complaints of coughing/choking on food on 9/18  - S/S consulted at OSH, recs dysphagia 3 soft-thin liquids. Will continue here.  -Aspiration precautions - Patient with complaints of coughing/choking on food on 9/18  - S/S consulted at OSH, recs dysphagia 3 soft-thin liquids. Reconsulted at [facility] and recommended pt be restarted on normal diet.  -Aspiration precautions

## 2021-09-27 NOTE — PROCEDURE NOTE - ADDITIONAL PROCEDURE DETAILS
Device model: [employer] Fortify Assura (implanted 7/31/13)  RV lead: Velazquez Durata (implanted 1/19/05)    9/26/21 - episode of paroxysmal AT at ~200bpm, in VT zone for rate. Received ATPx4 and 1 36J s/p termination. No other therapies.      <1%. Not pacemaker dependent.   Battery estimated 2.4-2.7 years.   Currently in sinus rhythm.

## 2021-09-27 NOTE — SWALLOW BEDSIDE ASSESSMENT ADULT - SWALLOW EVAL: DIAGNOSIS
81 yo M admitted for RLE cellulitis, MSSA bacteremia presents on bedside swallow evaluation with grossly functional oropharyngeal swallow. Oral management is adequate across all consistencies. There are no overt signs of laryngeal penetration/aspiration. Cannot rule out silent aspiration on bedside exam. If there is further concern for aspiration, MBS or FEES may be completed for objective assessment.

## 2021-09-27 NOTE — PROGRESS NOTE ADULT - PROBLEM SELECTOR PLAN 3
- Bilateral PE in 7/2020, on Xarelto   - Holding xarelto, on heparin drip in setting of upcoming procedure  -Will restart xarelto when able. - Bilateral PE in 7/2020, on Xarelto   - Holding xarelto, on heparin drip in setting of upcoming procedures  -Will restart xarelto when able.

## 2021-09-27 NOTE — CONSULT NOTE ADULT - ASSESSMENT
Pt is 80M, PMH of pancreatic cancer (3/2021), HTN, HLD, CHF, CAD s/p stents, ICD, TAVR, b/l PE, spinal stenosis s/p dylon placement, GERD, BPH, L. hip replacement x3, presenting with R. hallux OM s/p amputation.    Plan   Pt is 80M, PMH of pancreatic cancer (3/2021), HTN, HLD, CHF, CAD s/p stents, ICD, TAVR, b/l PE, spinal stenosis s/p dylon placement, GERD, BPH, L. hip replacement x3, presenting with R. hallux OM s/p amputation.    Plan  - Continue cefazolin 2g q8hr x 4 wks from negative blood cx with surveillance cultures 2-3 wks following completion of course  - CORBIN 9/27: no vegetations seen  - ICD extraction planned for 9/29 by cardiology  - Would recommend removal of Mediport as well to prevent recurrence, given high risk for seeding  Pt is 80M, PMH of pancreatic cancer (3/2021), HTN, HLD, CHF, CAD s/p stents, ICD, TAVR, b/l PE, spinal stenosis s/p dylon placement, GERD, BPH, L. hip replacement x3, presenting with R. hallux OM s/p amputation.     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 hallux amputation on 9/20  Hx of AICD   Hx of pancreatic cancer currently on chemotherapy with mediport  Hx of spinal hardware and hip prosthesis; low suspicion for prosthetic infection  Blood cx 9/16 NGTD  CORBIN 9/27: no vegetations seen    RECOMMENDATIONS:  - Continue cefazolin 2g q8hr for now  - ICD extraction planned for 9/29 by cardiology  - Would recommend removal of Mediport as well to prevent recurrence, given high risk for seeding   - Will continue to follow 80M, PMH of pancreatic cancer (3/2021), HTN, HLD, CHF, CAD s/p stents, ICD, TAVR, b/l PE, spinal stenosis s/p dylon placement, GERD, BPH, L. hip replacement x3, presenting with R. hallux OM s/p amputation.     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 hallux amputation on 9/20  Hx of AICD   Hx of pancreatic cancer currently on chemotherapy with mediport  Hx of spinal hardware and hip prosthesis; low suspicion for prosthetic infection  Blood cx 9/16 NGTD  CORBIN 9/27: no vegetations seen    RECOMMENDATIONS:  - Continue cefazolin 2g q8hr for now  - ICD extraction planned for 9/29 by cardiology  - Would recommend removal of Mediport as well to prevent recurrence, given high risk for seeding   - Will continue to follow

## 2021-09-27 NOTE — PROGRESS NOTE ADULT - SUBJECTIVE AND OBJECTIVE BOX
PROGRESS NOTE:   Authored by Fady Saenz MD, PGY1 [facility] Pager [phone] [facility] pager [phone]    Patient is a 80y old  Male who presents with a chief complaint of osteomylitis, c/f endocarditis (26 Sep 2021 17:34)      SUBJECTIVE / OVERNIGHT EVENTS:     REVIEW OF SYSTEMS:    CONSTITUTIONAL: No weakness, fevers or chills  EYES/ENT: No visual changes;  No vertigo or throat pain   NECK: No pain or stiffness  RESPIRATORY: No cough, wheezing, hemoptysis; No shortness of breath  CARDIOVASCULAR: No chest pain or palpitations  GASTROINTESTINAL: No abdominal or epigastric pain. No nausea, vomiting, or hematemesis; No diarrhea or constipation. No melena or hematochezia.  GENITOURINARY: No dysuria, frequency or hematuria  NEUROLOGICAL: No numbness or weakness  SKIN: No itching, rashes    MEDICATIONS  (STANDING):  ceFAZolin   IVPB 2000 milliGRAM(s) IV Intermittent every 8 hours  dorzolamide 2%/timolol 0.5% Ophthalmic Solution 1 Drop(s) Both EYES two times a day  DULoxetine 60 milliGRAM(s) Oral daily  furosemide    Tablet 20 milliGRAM(s) Oral daily  gabapentin 300 milliGRAM(s) Oral two times a day  influenza   Vaccine 0.5 milliLiter(s) IntraMuscular once  ketorolac 0.5% Ophthalmic Solution 1 Drop(s) Both EYES daily  latanoprost 0.005% Ophthalmic Solution 1 Drop(s) Both EYES at bedtime  melatonin 6 milliGRAM(s) Oral at bedtime  multivitamin 1 Tablet(s) Oral daily  pancrelipase  (CREON 36,000 Lipase Units) 2 Capsule(s) Oral three times a day with meals  pantoprazole    Tablet 40 milliGRAM(s) Oral before breakfast  pyridoxine 100 milliGRAM(s) Oral daily  sacubitril 49 mG/valsartan 51 mG 1 Tablet(s) Oral two times a day  sotalol 40 milliGRAM(s) Oral two times a day  tamsulosin 0.4 milliGRAM(s) Oral at bedtime    MEDICATIONS  (PRN):  acetaminophen   Tablet .. 650 milliGRAM(s) Oral every 6 hours PRN Temp greater or equal to 38C (100.4F), Mild Pain (1 - 3)  ALPRAZolam 0.25 milliGRAM(s) Oral three times a day PRN Anxiety/agitation  heparin   Injectable 8000 Unit(s) IV Push every 6 hours PRN For aPTT less than 40  heparin   Injectable 4000 Unit(s) IV Push every 6 hours PRN For aPTT between 40 - 57  oxycodone    5 mG/acetaminophen 325 mG 1 Tablet(s) Oral every 6 hours PRN Moderate Pain (4 - 6)      CAPILLARY BLOOD GLUCOSE        I&O's Summary    26 Sep 2021 07:01  -  27 Sep 2021 06:40  --------------------------------------------------------  IN: 240 mL / OUT: 2100 mL / NET: -1860 mL        PHYSICAL EXAM:  Vital Signs Last 24 Hrs  T(C): 36.3 (27 Sep 2021 04:00), Max: 36.6 (26 Sep 2021 11:35)  T(F): 97.4 (27 Sep 2021 04:00), Max: 97.9 (26 Sep 2021 11:35)  HR: 79 (27 Sep 2021 04:00) (76 - 83)  BP: 92/54 (27 Sep 2021 04:00) (92/54 - 112/66)  BP(mean): --  RR: 18 (27 Sep 2021 04:00) (18 - 19)  SpO2: 93% (27 Sep 2021 04:00) (93% - 97%)    CONSTITUTIONAL: NAD, well-developed  RESPIRATORY: Normal respiratory effort; lungs are clear to auscultation bilaterally  CARDIOVASCULAR: Regular rate and rhythm, normal S1 and S2, no murmur/rub/gallop; No lower extremity edema; Peripheral pulses are 2+ bilaterally  ABDOMEN: Nontender to palpation, normoactive bowel sounds, no rebound/guarding; No hepatosplenomegaly  MUSCLOSKELETAL: no clubbing or cyanosis of digits; no joint swelling or tenderness to palpation  PSYCH: A+O to person, place, and time; affect appropriate  NEURO: Non-focal, no tremors  SKIN: No rashes    LABS:                        8.6    4.07  )-----------( 299      ( 26 Sep 2021 18:46 )             27.6     09-26    138  |  104  |  13  ----------------------------<  94  4.3   |  29  |  0.82    Ca    8.6      26 Sep 2021 08:03  Phos  4.3     09-26  Mg     1.9     09-26      PT/INR - ( 25 Sep 2021 08:46 )   PT: 13.9 sec;   INR: 1.20 ratio         PTT - ( 27 Sep 2021 02:58 )  PTT:69.6 sec  CARDIAC MARKERS ( 26 Sep 2021 00:29 )  <.015 ng/mL / x     / 19 U/L / x     / <1.0 ng/mL            RADIOLOGY & ADDITIONAL TESTS:  No new imaging or tests    COORDINATION OF CARE:  Care Discussed with Consultants/Other Providers [Y/N]:  Prior or Outpatient Records Reviewed [Y/N]:   PROGRESS NOTE:   Authored by Fady Saenz MD, PGY1 [facility] Pager [phone] [facility] pager [phone]    Patient is a 80y old  Male who presents with a chief complaint of osteomylitis, c/f endocarditis (26 Sep 2021 17:34)      SUBJECTIVE / OVERNIGHT EVENTS: No acute events overnight. Went for CORBIN this AM without complications. Pt notes feeling well without any major complaints including no fevers, chills, chest pain, SOB, palpitations, n/v/d. Just mild soreness at amputation site.     REVIEW OF SYSTEMS:    CONSTITUTIONAL: No weakness, fevers or chills  EYES/ENT: No visual changes;  No vertigo or throat pain   NECK: No pain or stiffness  RESPIRATORY: No cough, wheezing, hemoptysis; No shortness of breath  CARDIOVASCULAR: No chest pain or palpitations  GASTROINTESTINAL: No abdominal or epigastric pain. No nausea, vomiting, or hematemesis; No diarrhea or constipation. No melena or hematochezia.  GENITOURINARY: No dysuria, frequency or hematuria  NEUROLOGICAL: No numbness or weakness  SKIN: No itching, rashes    MEDICATIONS  (STANDING):  ceFAZolin   IVPB 2000 milliGRAM(s) IV Intermittent every 8 hours  dorzolamide 2%/timolol 0.5% Ophthalmic Solution 1 Drop(s) Both EYES two times a day  DULoxetine 60 milliGRAM(s) Oral daily  furosemide    Tablet 20 milliGRAM(s) Oral daily  gabapentin 300 milliGRAM(s) Oral two times a day  influenza   Vaccine 0.5 milliLiter(s) IntraMuscular once  ketorolac 0.5% Ophthalmic Solution 1 Drop(s) Both EYES daily  latanoprost 0.005% Ophthalmic Solution 1 Drop(s) Both EYES at bedtime  melatonin 6 milliGRAM(s) Oral at bedtime  multivitamin 1 Tablet(s) Oral daily  pancrelipase  (CREON 36,000 Lipase Units) 2 Capsule(s) Oral three times a day with meals  pantoprazole    Tablet 40 milliGRAM(s) Oral before breakfast  pyridoxine 100 milliGRAM(s) Oral daily  sacubitril 49 mG/valsartan 51 mG 1 Tablet(s) Oral two times a day  sotalol 40 milliGRAM(s) Oral two times a day  tamsulosin 0.4 milliGRAM(s) Oral at bedtime    MEDICATIONS  (PRN):  acetaminophen   Tablet .. 650 milliGRAM(s) Oral every 6 hours PRN Temp greater or equal to 38C (100.4F), Mild Pain (1 - 3)  ALPRAZolam 0.25 milliGRAM(s) Oral three times a day PRN Anxiety/agitation  heparin   Injectable 8000 Unit(s) IV Push every 6 hours PRN For aPTT less than 40  heparin   Injectable 4000 Unit(s) IV Push every 6 hours PRN For aPTT between 40 - 57  oxycodone    5 mG/acetaminophen 325 mG 1 Tablet(s) Oral every 6 hours PRN Moderate Pain (4 - 6)      CAPILLARY BLOOD GLUCOSE        I&O's Summary    26 Sep 2021 07:01  -  27 Sep 2021 06:40  --------------------------------------------------------  IN: 240 mL / OUT: 2100 mL / NET: -1860 mL        PHYSICAL EXAM:  Vital Signs Last 24 Hrs  T(C): 36.3 (27 Sep 2021 04:00), Max: 36.6 (26 Sep 2021 11:35)  T(F): 97.4 (27 Sep 2021 04:00), Max: 97.9 (26 Sep 2021 11:35)  HR: 79 (27 Sep 2021 04:00) (76 - 83)  BP: 92/54 (27 Sep 2021 04:00) (92/54 - 112/66)  BP(mean): --  RR: 18 (27 Sep 2021 04:00) (18 - 19)  SpO2: 93% (27 Sep 2021 04:00) (93% - 97%)    CONSTITUTIONAL: NAD, well-developed  RESPIRATORY: Normal respiratory effort; lungs are clear to auscultation bilaterally  CARDIOVASCULAR: Regular rate and rhythm, normal S1 and S2, no murmur/rub/gallop; No lower extremity edema; Peripheral pulses are 2+ bilaterally  ABDOMEN: Nontender to palpation, normoactive bowel sounds, no rebound/guarding; No hepatosplenomegaly  MUSCLOSKELETAL: no clubbing or cyanosis of digits; RLE dressing appears clean and dry. No LE swelling, redness, or TTP  PSYCH: A+O to person, place, and time; affect appropriate  NEURO: Non-focal, no tremors  SKIN: No rashes    LABS:                        8.6    4.07  )-----------( 299      ( 26 Sep 2021 18:46 )             27.6     09-26    138  |  104  |  13  ----------------------------<  94  4.3   |  29  |  0.82    Ca    8.6      26 Sep 2021 08:03  Phos  4.3     09-26  Mg     1.9     09-26      PT/INR - ( 25 Sep 2021 08:46 )   PT: 13.9 sec;   INR: 1.20 ratio         PTT - ( 27 Sep 2021 02:58 )  PTT:69.6 sec  CARDIAC MARKERS ( 26 Sep 2021 00:29 )  <.015 ng/mL / x     / 19 U/L / x     / <1.0 ng/mL            RADIOLOGY & ADDITIONAL TESTS:  No new imaging or tests    COORDINATION OF CARE:  Care Discussed with Consultants/Other Providers [Y/N]:  Prior or Outpatient Records Reviewed [Y/N]:

## 2021-09-27 NOTE — SWALLOW BEDSIDE ASSESSMENT ADULT - SLP GENERAL OBSERVATIONS
Pt AA+Ox4; denies dysphagia; says he "had a cold" when he was evaluated at [facility] and coughed so they put him on thick liquids and then advanced him to regular prior to leaving but it did not carryover. Denies h/o pneumonia.

## 2021-09-27 NOTE — PROGRESS NOTE ADULT - ATTENDING COMMENTS
above plans discussed with Dr. 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 Saenz    # MSSA bacteremia  # RLE cellulitis/OM of hallux  # concern for ICD infection  # chronic HFrEF  # PE/DVT on xarelto    - CORBIN with no clear vegetation but given high risk, EP planning on extraction of ICD  - plan for ICD extraction on 9/29  - continue IV ancef via PICC line (will not need total 6 week course); ID consulted  - no signs of volume overload at this time; continue home dose lasix 20mg daily  - conitnue IV heparin while waiting for procedure  - pending s/s eval  - PT consult: previously recommended for TRINA Feng MD  Division of Hospital Medicine  Cell: [phone]  Office: [phone]

## 2021-09-27 NOTE — CONSULT NOTE ADULT - SUBJECTIVE AND OBJECTIVE BOX
Patient is a 80y old  Male who presents with a chief complaint of osteomylitis, c/f endocarditis (27 Sep 2021 10:15)    HPI:  Mr. 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 Given recent TAVR, transfered [facility] for CORBIN and consideration of ICD extraction. At [place], S/p R. hallux amputation 9/20 and RRT 9/23 for ruptured hematoma @ surgical site during PT, wound irrigated and packed, left open to heal by secondary intention. Bcx 9/15 growing MSSA, with clearance on Bcx 9/16. Currently, pt feels well. Notes some numbness/soreness at amputation site but otherwise ROS negative.  (26 Sep 2021 14:04)     ID consulted for R. hallux OM s/p amputation. Receiving cefazolin. CORBIN 9/27 showing no evidence of vegetations. History of MSSA bacteremia 9/15, cleared 9/16. Pt notes history of L. hip replacement, back surgery with dylon placement, TAVR, and ICD placement. Denies fevers, chills, night sweats, chest pain, SOB.        prior hospital charts reviewed [x]  primary team notes reviewed [x]  other consultant notes reviewed [x]    PAST MEDICAL & SURGICAL HISTORY:  HTN (hypertension)  HLD (hyperlipidemia)  GERD (gastroesophageal reflux disease)  Cardiomyopathy  History of total hip replacement left  History of laminectomy  ICD (implantable cardiac defibrillator) in place  Benign testicular tumor  History of hip replacement  Status post amputation of toe of right foot      Allergies  No Known Allergies    ANTIMICROBIALS (past 90 days)  MEDICATIONS  (STANDING):  ceFAZolin   IVPB   100 mL/Hr IV Intermittent (09-27-21 @ 13:20)   100 mL/Hr IV Intermittent (09-27-21 @ 05:09)   100 mL/Hr IV Intermittent (09-26-21 @ 22:12)   100 mL/Hr IV Intermittent (09-26-21 @ 13:03)      ANTIMICROBIALS:    ceFAZolin   IVPB 2000 every 8 hours    OTHER MEDS: MEDICATIONS  (STANDING):  acetaminophen   Tablet .. 650 every 6 hours PRN  ALPRAZolam 0.25 three times a day PRN  DULoxetine 60 daily  gabapentin 300 two times a day  heparin   Injectable 8000 every 6 hours PRN  heparin   Injectable 4000 every 6 hours PRN  heparin  Infusion. 1000 <Continuous>  influenza   Vaccine 0.5 once  melatonin 6 at bedtime  oxycodone    5 mG/acetaminophen 325 mG 1 every 6 hours PRN  pancrelipase  (CREON 36,000 Lipase Units) 2 three times a day with meals  pantoprazole    Tablet 40 before breakfast  sacubitril 49 mG/valsartan 51 mG 1 two times a day  sotalol 40 two times a day  tamsulosin 0.4 at bedtime    SOCIAL HISTORY:   Tobacco: denies  EtOH: occasional  Recreational drug use: denies  Lives: alone  Ambulates: cane  ADLs: independent  Vaccinations: Pfizer x2 2/2021 (26 Sep 2021 14:04)    FAMILY HISTORY:  Family history of stroke (Mother)    REVIEW OF SYSTEMS  [  ] ROS unobtainable because:    [x] All other systems negative except as noted below:	    Constitutional:  [ ] fever [ ] chills  [ ] weight loss  [ ] weakness  Skin:  [ ] rash [ ] phlebitis	  Eyes: [ ] icterus [ ] pain  [ ] discharge	  ENMT: [ ] sore throat  [ ] thrush [ ] ulcers [ ] exudates  Respiratory: [ ] dyspnea [ ] hemoptysis [ ] cough [ ] sputum	  Cardiovascular:  [ ] chest pain [ ] palpitations [ ] edema	  Gastrointestinal:  [ ] nausea [ ] vomiting [ ] diarrhea [ ] constipation [ ] pain	  Genitourinary:  [ ] dysuria [ ] frequency [ ] hematuria [ ] discharge [ ] flank pain  [ ] incontinence  Musculoskeletal:  [ ] myalgias [ ] arthralgias [ ] arthritis  [ ] back pain  Neurological:  [ ] headache [ ] seizures  [ ] confusion/altered mental status  Psychiatric:  [ ] anxiety [ ] depression	  Hematology/Lymphatics:  [ ] lymphadenopathy  Endocrine:  [ ] adrenal [ ] thyroid  Allergic/Immunologic:	 [ ] transplant [ ] seasonal    Vital Signs Last 24 Hrs  T(F): 97.4 (09-27-21 @ 13:22), Max: 98.2 (09-20-21 @ 15:00)  Vital Signs Last 24 Hrs  HR: 69 (09-27-21 @ 13:22) (69 - 83)  BP: 125/75 (09-27-21 @ 13:22) (92/54 - 125/75)  RR: 18 (09-27-21 @ 13:22)  SpO2: 98% (09-27-21 @ 13:22) (93% - 98%)  Wt(kg): --    EXAM:  Constitutional: Not in acute distress  HEENT: EOMI, no neck vein distension noted  RS: +wheezing diffusely  CVS: S1, S2 heard. Regular rate and rhythm. No murmurs/rubs/gallops.  Abdomen: Soft. No guarding/rigidity/tenderness.  Extremities: +R. foot wound dressing c/d/i  Skin: No other lesions noted  Vascular: No evidence of phlebitis  Neuro: Alert, oriented to time/place/person                          9.1    3.64  )-----------( 347      ( 27 Sep 2021 07:24 )             29.2     09-27    136  |  99  |  15  ----------------------------<  91  4.3   |  25  |  0.81    Ca    9.3      27 Sep 2021 07:24  Phos  4.1     09-27  Mg     1.8     09-27      MICROBIOLOGY:                  RADIOLOGY:  imaging below personally reviewed    OTHER TESTS:   Patient is a 80y old  Male who presents with a chief complaint of osteomylitis, c/f endocarditis (27 Sep 2021 10:15)    HPI:  Mr. 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 Given recent TAVR, transfered [facility] for CORBIN and consideration of ICD extraction. At [place], S/p R. hallux amputation 9/20 and RRT 9/23 for ruptured hematoma @ surgical site during PT, wound irrigated and packed, left open to heal by secondary intention. Bcx 9/15 growing MSSA, with clearance on Bcx 9/16. Currently, pt feels well. Notes some numbness/soreness at amputation site but otherwise ROS negative.  (26 Sep 2021 14:04)     ID consulted for R. hallux OM s/p amputation. Receiving cefazolin. CORBIN 9/27 showing no evidence of vegetations. History of MSSA bacteremia 9/15, cleared 9/16. Pt notes history of L. hip replacement, back surgery with dylon placement, TAVR, and ICD placement. Denies fevers, chills, night sweats, chest pain, SOB.        prior hospital charts reviewed [x]  primary team notes reviewed [x]  other consultant notes reviewed [x]    PAST MEDICAL & SURGICAL HISTORY:  HTN (hypertension)  HLD (hyperlipidemia)  GERD (gastroesophageal reflux disease)  Cardiomyopathy  History of total hip replacement left  History of laminectomy  ICD (implantable cardiac defibrillator) in place  Benign testicular tumor  History of hip replacement  Status post amputation of toe of right foot      Allergies  No Known Allergies    ANTIMICROBIALS (past 90 days)  MEDICATIONS  (STANDING):  ceFAZolin   IVPB   100 mL/Hr IV Intermittent (09-27-21 @ 13:20)   100 mL/Hr IV Intermittent (09-27-21 @ 05:09)   100 mL/Hr IV Intermittent (09-26-21 @ 22:12)   100 mL/Hr IV Intermittent (09-26-21 @ 13:03)      ANTIMICROBIALS:    ceFAZolin   IVPB 2000 every 8 hours    OTHER MEDS: MEDICATIONS  (STANDING):  acetaminophen   Tablet .. 650 every 6 hours PRN  ALPRAZolam 0.25 three times a day PRN  DULoxetine 60 daily  gabapentin 300 two times a day  heparin   Injectable 8000 every 6 hours PRN  heparin   Injectable 4000 every 6 hours PRN  heparin  Infusion. 1000 <Continuous>  influenza   Vaccine 0.5 once  melatonin 6 at bedtime  oxycodone    5 mG/acetaminophen 325 mG 1 every 6 hours PRN  pancrelipase  (CREON 36,000 Lipase Units) 2 three times a day with meals  pantoprazole    Tablet 40 before breakfast  sacubitril 49 mG/valsartan 51 mG 1 two times a day  sotalol 40 two times a day  tamsulosin 0.4 at bedtime    SOCIAL HISTORY:   Tobacco: denies  EtOH: occasional  Recreational drug use: denies  Lives: alone  Ambulates: cane  ADLs: independent  Vaccinations: Pfizer x2 2/2021 (26 Sep 2021 14:04)    FAMILY HISTORY:  Family history of stroke (Mother)    REVIEW OF SYSTEMS  [  ] ROS unobtainable because:    [x] All other systems negative except as noted below:	    Constitutional:  [ ] fever [ ] chills  [ ] weight loss  [ ] weakness  Skin:  [ ] rash [ ] phlebitis	  Eyes: [ ] icterus [ ] pain  [ ] discharge	  ENMT: [ ] sore throat  [ ] thrush [ ] ulcers [ ] exudates  Respiratory: [ ] dyspnea [ ] hemoptysis [ ] cough [ ] sputum	  Cardiovascular:  [ ] chest pain [ ] palpitations [ ] edema	  Gastrointestinal:  [ ] nausea [ ] vomiting [ ] diarrhea [ ] constipation [ ] pain	  Genitourinary:  [ ] dysuria [ ] frequency [ ] hematuria [ ] discharge [ ] flank pain  [ ] incontinence  Musculoskeletal:  [ ] myalgias [ ] arthralgias [ ] arthritis  [ ] back pain  Neurological:  [ ] headache [ ] seizures  [ ] confusion/altered mental status  Psychiatric:  [ ] anxiety [ ] depression	  Hematology/Lymphatics:  [ ] lymphadenopathy  Endocrine:  [ ] adrenal [ ] thyroid  Allergic/Immunologic:	 [ ] transplant [ ] seasonal    Vital Signs Last 24 Hrs  T(F): 97.4 (09-27-21 @ 13:22), Max: 98.2 (09-20-21 @ 15:00)  Vital Signs Last 24 Hrs  HR: 69 (09-27-21 @ 13:22) (69 - 83)  BP: 125/75 (09-27-21 @ 13:22) (92/54 - 125/75)  RR: 18 (09-27-21 @ 13:22)  SpO2: 98% (09-27-21 @ 13:22) (93% - 98%)  Wt(kg): --    EXAM:  Constitutional: Not in acute distress  HEENT: EOMI, no neck vein distension noted  RS: +wheezing diffusely  CVS: S1, S2 heard. Regular rate and rhythm. No murmurs/rubs/gallops.  Abdomen: Soft. No guarding/rigidity/tenderness.  Extremities: +R. foot wound dressing c/d/i  Skin: No other lesions noted  Vascular: No evidence of phlebitis  Neuro: Alert, oriented to time/place/person                          9.1    3.64  )-----------( 347      ( 27 Sep 2021 07:24 )             29.2     09-27    136  |  99  |  15  ----------------------------<  91  4.3   |  25  |  0.81    Ca    9.3      27 Sep 2021 07:24  Phos  4.1     09-27  Mg     1.8     09-27      MICROBIOLOGY:    Culture - Blood (09.15.21 @ 01:20)   Gram Stain:   Growth in aerobic bottle: Gram Positive Cocci in Clusters   Growth in anaerobic bottle: Gram Positive Cocci in Clusters   - Ampicillin/Sulbactam: S <=8/4   - Oxacillin: S 0.5   - Gentamicin: S <=1 Should not be used as monotherapy   - Clindamycin: R <=0.25 This isolate is presumed to be clindamycin resistant based on detection of inducible resistance. Clindamycin may still be effective in some patients.   - Erythromycin: I 1   - Cefazolin: S <=4   - Staphylococcus aureus: Detec Any isolate of Staphylococcus aureus from a blood culture is NOT considered a contaminant.   - Trimethoprim/Sulfamethoxazole: S <=0.5/9.5   - Vancomycin: S 2   - RIF- Rifampin: S <=1 Should not be used as monotherapy   - Tetra/Doxy: S <=1   Specimen Source: .Blood Blood-Peripheral   Organism: Blood Culture PCR   Organism: Staphylococcus aureus   Culture Results:   Growth in aerobic and anaerobic bottles: Staphylococcus aureus   ***Blood Panel PCR results on this specimen are available   approximately 3 hours after the Gram stain result.***   Gram stain, PCR, and/or culture results may not always   correspond dueto difference in methodologies.   ************************************************************   This PCR assay was performed by multiplex PCR. This   Assay tests for 66 bacterial and resistance gene targets.   Please refer to the [facility] vWise test directory   at [email or web address] for details.   Organism Identification: Blood Culture PCR   Staphylococcus aureus   Method Type: TYSON   Method Type: PCR       RADIOLOGY:  imaging below personally reviewed    < from: Xray Foot AP + Lateral + Oblique, Right (09.20.21 @ 16:37) >  IMPRESSION:    Post transmetatarsal amputation first hallux.    < end of copied text >      OTHER TESTS:

## 2021-09-27 NOTE — CONSULT NOTE ADULT - ASSESSMENT
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    Cellulitis of right lower leg and Acute OM of right hallux  - s/p right 1st partial ray resection.  Tolerated procedure well with no obvious cardiac complications  - Pain control  - MSSA bacteremia on admission BCx; repeat BCx from 9/16 are NGTD  - ID recommending CORBIN in the setting of TAVR and ICD.   -Discussed  with Dr. Quiles in [facility] re: ICD possible ICD extraction.    - Follow up with Dr. Quiles today regarding extraction  - Abx per ID.  Follow recs    Pulmonary embolism.   - Bilateral PE in 7/2020, on Xarelto   - stopped  Xarelto, on heparin  gtt pending CORBIN / device extraction    Chronic systolic HF S/P ICD, Severe MS  - Echo not well visualized LV, severe Mitral stenosis, left atrial enlargement   - ICD interrogation completed, no shocks recorded, ICD functioning battery life 2.5 years; Vpaced <1%  - Continue Entresto   - cont lasix for now   - No signs of acute volume overload. Tolerating Room air  - Strict I/Os, daily weights  - Monitor and replete Lytes. Keep K > 4 and Mg > 2  - Unclear why on sotalol, continue for now.  Would need outpatient records.  Follows with Dr. Manoj Perez in [facility]    HTN  - continue sotalol with hold parameters  - continue entresto with hold parameters  - if bp remains soft hold lasix temporarily     - Monitor and replete lytes, keep K>4, Mg>2.  - All other medical needs as per primary team.  - Other cardiovascular workup will depend on clinical course.  - Will continue to follow.

## 2021-09-27 NOTE — PHARMACOTHERAPY INTERVENTION NOTE - COMMENTS
JUANIS FIELDJOSSELYNDIDIER, 80y Male with MSSA bacteremia, found at [facility] now transferred here to [facility] for further evaluation.    Recommendation(s):  1) As per hospital policy, patient requires ID consult for MSSA bacteremia. Can c/w current dosing of cefazolin, f/u ID recs.    With kind regards,  Shai Norton PharmD  Infectious Diseases Clinical Pharmacist  .

## 2021-09-27 NOTE — PROGRESS NOTE ADULT - PROBLEM SELECTOR PLAN 2
Currently saturating well on Room air without evidence of acute volume overload.   - TTE 9/18 not well visualized LV, severe Mitral stenosis, left atrial enlargement   - F/U CORBIN tomorrow  - continue sotalol with hold parameters  - continue entresto with hold parameters  - continue lasix with hold parameters\  - Monitor and replete lytes, keep K>4, Mg>2.  - Strict Is/Os, daily weights. Currently saturating well on Room air without evidence of acute volume overload.   - TTE 9/18 not well visualized LV, severe Mitral stenosis, left atrial enlargement   - CORBIN 9/27 with estimated EF 30-35%  - continue sotalol with hold parameters  - continue entresto with hold parameters  - continue lasix with hold parameters  - Monitor and replete lytes, keep K>4, Mg>2.  - Strict Is/Os, daily weights.

## 2021-09-27 NOTE — CONSULT NOTE ADULT - ASSESSMENT
1. RLE cellulitis and acute OM of right hallux s/p Rt 1st partial ray resection.   2. Chronic systolic HF s/p single chamber Abbott ICD.   3. HTN  4. 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 Currently on Cefazolin.  - CORBIN done today. Results pending.   - Currently on Sotalol. Unclear indication, will obtain records from [facility].  - Please keep NPO after midnight 9/28/21 for extraction on 9/29/21.   - Hold Heparin 9/29 midnight.   - Please repeat COVID swab today  - Monitor and replete lytes. Keep K>4, Mg>2.     Discussed with Dr. Quiles.  1. RLE cellulitis and acute OM of right hallux s/p Rt 1st partial ray resection.   2. Chronic systolic HF s/p single chamber Abbott ICD.   3. HTN  4. 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 Bilateral PE (7/2021) previously on Xarelto    Single chamber ICD Velazquez Bella Blake ([id number]) implanted 7/31/13  RV lead - Velazquez Mahendra ([id number]) implanted 1/19/05  1 episode of paroxysmal AT 9/26/21 at ~200bpm. Met rate for VT zone. Received ATPx4 & 1 36J shock s/p termination. No other therapies.   Followed at [facility]    - MSSA bacteremia on admission BCx 9/15; repeat BCx from 9/16 are NGTD. Currently on Cefazolin.  - CORBIN done today. Results pending.   - Currently on Sotalol. Unclear indication, will obtain records from [facility].  - Please keep NPO after midnight 9/28/21 for extraction on 9/29/21.   - Hold Heparin 9/29 midnight.   - Please repeat COVID swab today  - Monitor and replete lytes. Keep K>4, Mg>2.     Discussed with Dr. Quiles.  1. RLE cellulitis and acute OM of right hallux s/p Rt 1st partial ray resection.   2. Chronic systolic HF s/p single chamber Abbott ICD.   3. HTN  4. 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 Bilateral PE (7/2021) previously on Xarelto    Single chamber ICD Velazquez Bella Blake ([id number]) implanted 7/31/13  RV lead - Velazquez Mahendra ([id number]) implanted 1/19/05  1 episode of paroxysmal AT 9/26/21 at ~200bpm. Met rate for VT zone. Received ATPx4 & 1 36J shock s/p termination. No other therapies.   Followed at [facility]    - MSSA bacteremia on admission BCx 9/15; repeat BCx from 9/16 are NGTD. Currently on Cefazolin. ID following  - CORBIN done today. No vegetations seen.  - Currently on Sotalol. Unclear indication, will obtain records from [facility].  - Please keep NPO after midnight 9/28/21 for extraction on 9/29/21.   -- Hold Heparin 9/28 midnight.   - Please repeat COVID swab today  - Monitor and replete lytes. Keep K>4, Mg>2.     Discussed with Dr. Quiles.

## 2021-09-27 NOTE — CONSULT NOTE ADULT - SUBJECTIVE AND OBJECTIVE BOX
[facility] - Milo Thomas, Adolfo, Binta, Brittanie, Javier Don  Office Number: [phone]    Initial Consult Note    CHIEF COMPLAINT: Patient is a 80y old  Male who presents with a chief complaint of osteomylitis, c/f endocarditis       HPI:  Mr. Gatica is a 80 year old male with PMH pancreatic cancer (dx 3/2021, currently undergoing chemo), HTN, HLD, CHFrEF, CAD s/p stents x2 (~15 years ago), defibrillator, TAVR (4/2021), bilateral PE (7/2021) on Xarelto, spinal stenosis, GERD, BPH, macular degeneration initially presented to [facility] c/o R foot pain admitted for R hallux pressure injury and RLE cellulitis found to have MSSA bacteremia and acute OM of right hallux. Given recent TAVR, transfered [facility] for CORBIN and consideration of ICD extraction. At [place], S/p R. hallux amputation 9/20 and RRT 9/23 for ruptured hematoma @ surgical site during PT, wound irrigated and packed, left open to heal by secondary intention. Bcx 9/15 growing MSSA, with clearance on Bcx 9/16. Currently, pt feels well. Notes some numbness/soreness at amputation site but otherwise ROS negative.      On my interview, no chest pain, dyspnea, PND, orthopnea, LE swelling, dizziness, or syncope.      PAST MEDICAL & SURGICAL HISTORY:  HTN (hypertension)    HLD (hyperlipidemia)    GERD (gastroesophageal reflux disease)    Cardiomyopathy    History of total hip replacement  left    History of laminectomy    ICD (implantable cardiac defibrillator) in place    Benign testicular tumor    History of hip replacement    Status post amputation of toe of right foot        SOCIAL HISTORY:  No tobacco, ethanol, or drug abuse.    FAMILY HISTORY:  Family history of stroke (Mother)      No family history of acute MI or sudden cardiac death.    MEDICATIONS  (STANDING):  ceFAZolin   IVPB 2000 milliGRAM(s) IV Intermittent every 8 hours  dorzolamide 2%/timolol 0.5% Ophthalmic Solution 1 Drop(s) Both EYES two times a day  DULoxetine 60 milliGRAM(s) Oral daily  furosemide    Tablet 20 milliGRAM(s) Oral daily  gabapentin 300 milliGRAM(s) Oral two times a day  influenza   Vaccine 0.5 milliLiter(s) IntraMuscular once  ketorolac 0.5% Ophthalmic Solution 1 Drop(s) Both EYES daily  latanoprost 0.005% Ophthalmic Solution 1 Drop(s) Both EYES at bedtime  melatonin 6 milliGRAM(s) Oral at bedtime  multivitamin 1 Tablet(s) Oral daily  pancrelipase  (CREON 36,000 Lipase Units) 2 Capsule(s) Oral three times a day with meals  pantoprazole    Tablet 40 milliGRAM(s) Oral before breakfast  pyridoxine 100 milliGRAM(s) Oral daily  sacubitril 49 mG/valsartan 51 mG 1 Tablet(s) Oral two times a day  sotalol 40 milliGRAM(s) Oral two times a day  tamsulosin 0.4 milliGRAM(s) Oral at bedtime    MEDICATIONS  (PRN):  acetaminophen   Tablet .. 650 milliGRAM(s) Oral every 6 hours PRN Temp greater or equal to 38C (100.4F), Mild Pain (1 - 3)  ALPRAZolam 0.25 milliGRAM(s) Oral three times a day PRN Anxiety/agitation  heparin   Injectable 8000 Unit(s) IV Push every 6 hours PRN For aPTT less than 40  heparin   Injectable 4000 Unit(s) IV Push every 6 hours PRN For aPTT between 40 - 57  oxycodone    5 mG/acetaminophen 325 mG 1 Tablet(s) Oral every 6 hours PRN Moderate Pain (4 - 6)      Allergies    No Known Allergies    Intolerances        REVIEW OF SYSTEMS:    CONSTITUTIONAL: No weakness, fevers or chills  EYES/ENT: No visual changes;  No vertigo or throat pain   NECK: No pain or stiffness  RESPIRATORY: No cough, wheezing, hemoptysis; No shortness of breath  CARDIOVASCULAR: No chest pain or palpitations  GASTROINTESTINAL: No abdominal pain. No nausea, vomiting, or hematemesis; No diarrhea or constipation. No melena or hematochezia.  GENITOURINARY: No dysuria, frequency or hematuria  NEUROLOGICAL: No numbness or weakness  SKIN: No itching or rash  All other review of systems is negative unless indicated above    VITAL SIGNS:   Vital Signs Last 24 Hrs  T(C): 36.3 (27 Sep 2021 04:00), Max: 36.6 (26 Sep 2021 11:35)  T(F): 97.4 (27 Sep 2021 04:00), Max: 97.9 (26 Sep 2021 11:35)  HR: 79 (27 Sep 2021 04:00) (76 - 83)  BP: 92/54 (27 Sep 2021 04:00) (92/54 - 112/66)  BP(mean): --  RR: 18 (27 Sep 2021 04:00) (18 - 19)  SpO2: 93% (27 Sep 2021 04:00) (93% - 97%)    I&O's Summary    26 Sep 2021 07:01  -  27 Sep 2021 07:00  --------------------------------------------------------  IN: 240 mL / OUT: 3200 mL / NET: -2960 mL        On Exam:    Constitutional: NAD, alert and oriented x 3  Lungs:  Non-labored, breath sounds are clear bilaterally, No wheezing, rales or rhonchi  Cardiovascular: RRR.  S1 and S2 positive.  No murmurs, rubs, gallops or clicks  Gastrointestinal: Bowel Sounds present, soft, nontender.   Lymph: No peripheral edema. No cervical lymphadenopathy.  Neurological: Alert, no focal deficits  Skin: No rashes or ulcers   Psych:  Mood & affect appropriate.    LABS: All Labs Reviewed:                        9.1    3.64  )-----------( 347      ( 27 Sep 2021 07:24 )             29.2                         8.6    4.07  )-----------( 299      ( 26 Sep 2021 18:46 )             27.6                         9.0    4.36  )-----------( 309      ( 26 Sep 2021 08:03 )             28.8     26 Sep 2021 08:03    138    |  104    |  13     ----------------------------<  94     4.3     |  29     |  0.82   25 Sep 2021 08:46    138    |  105    |  12     ----------------------------<  91     4.8     |  30     |  0.77   24 Sep 2021 09:14    139    |  103    |  10     ----------------------------<  121    4.7     |  30     |  0.76     Ca    8.6        26 Sep 2021 08:03  Ca    8.8        25 Sep 2021 08:46  Ca    9.0        24 Sep 2021 09:14  Phos  4.3       26 Sep 2021 08:03  Mg     1.9       26 Sep 2021 08:03  Mg     2.1       24 Sep 2021 09:14      PT/INR - ( 27 Sep 2021 07:27 )   PT: 13.4 sec;   INR: 1.12 ratio         PTT - ( 27 Sep 2021 02:58 )  PTT:69.6 sec  CARDIAC MARKERS ( 26 Sep 2021 00:29 )  <.015 ng/mL / x     / 19 U/L / x     / <1.0 ng/mL      Blood Culture:         RADIOLOGY:    EKG:

## 2021-09-27 NOTE — PROGRESS NOTE ADULT - SUBJECTIVE AND OBJECTIVE BOX
Podiatry pager #: [phone] ([place])/ [phone] ([facility])    Patient is a 80y old  Male who presents with a chief complaint of osteomylitis, c/f endocarditis (27 Sep 2021 14:22)       INTERVAL HPI/OVERNIGHT EVENTS:  Patient seen and evaluated at bedside.  Pt is resting comfortable in NAD. Denies N/V/F/C.     Allergies    No Known Allergies    Intolerances        Vital Signs Last 24 Hrs  T(C): 36.3 (27 Sep 2021 13:22), Max: 36.4 (26 Sep 2021 20:28)  T(F): 97.4 (27 Sep 2021 13:22), Max: 97.6 (26 Sep 2021 23:55)  HR: 69 (27 Sep 2021 13:22) (69 - 83)  BP: 125/75 (27 Sep 2021 13:22) (92/54 - 125/75)  BP(mean): --  RR: 18 (27 Sep 2021 13:22) (18 - 19)  SpO2: 98% (27 Sep 2021 13:22) (93% - 98%)    LABS:                        9.1    3.64  )-----------( 347      ( 27 Sep 2021 07:24 )             29.2     09-27    136  |  99  |  15  ----------------------------<  91  4.3   |  25  |  0.81    Ca    9.3      27 Sep 2021 07:24  Phos  4.1     09-27  Mg     1.8     09-27      PT/INR - ( 27 Sep 2021 07:27 )   PT: 13.4 sec;   INR: 1.12 ratio         PTT - ( 27 Sep 2021 02:58 )  PTT:69.6 sec    CAPILLARY BLOOD GLUCOSE          Lower Extremity Physical Exam:  Vasular: DP/PT 0/4, B/L, CFT <3 seconds B/L, Temperature gradient warm to cool, B/L.   Neuro: Epicritic sensation diminished to the level of digits, B/L.  Musculoskeletal/Ortho: s/p R foot partial ray resection on 9/20  Skin: R foot surgical site proximal sutures intact, distal sutures dehisced, no further hematoma. Surgical flaps are warm and viable, no erythema, no malodor, no signs of acute infection. L foot is unremarkable for any signs of infection     RADIOLOGY & ADDITIONAL TESTS:

## 2021-09-27 NOTE — PROGRESS NOTE ADULT - PROBLEM SELECTOR PLAN 5
- ICD placed >20 years ago, pt had a very fast HR in the setting of a viral infection, but unclear exactly what arrhythmia  - Denied cardiac arrest, suspect sustained VT with pulse and had ICD placement  - Continue sotalol BID  -ICD interrogated at OSH, no shocks recorded, ICD functioning battery life 2.5 years; Vpaced <1%  - Consideration of ICD extraction as above  -Pt with apparent AICD fired x 1 overnight 9/25, pt asymptomatic, denied chest pain other than feeling shock. Will monitor. Currently sinus with some v-pacing on tele - ICD placed >20 years ago, pt had a very fast HR in the setting of a viral infection, but unclear exactly what arrhythmia  - Denied cardiac arrest, suspect sustained VT with pulse and had ICD placement  - Continue sotalol BID  -ICD interrogated at OSH, no shocks recorded, ICD functioning battery life 2.5 years; Vpaced <1%  - Plan for ICD extraction as above.   -Pt with apparent AICD fired x 1 overnight 9/25, pt asymptomatic, denied chest pain other than feeling shock. Will monitor. Currently sinus with minimal v-pacing on tele

## 2021-09-28 LAB
ANION GAP SERPL CALC-SCNC: 11 MMOL/L — SIGNIFICANT CHANGE UP (ref 5–17)
APTT BLD: 57.1 SEC — HIGH (ref 27.5–35.5)
APTT BLD: >200 SEC — CRITICAL HIGH (ref 27.5–35.5)
BLD GP AB SCN SERPL QL: NEGATIVE — SIGNIFICANT CHANGE UP
BUN SERPL-MCNC: 14 MG/DL — SIGNIFICANT CHANGE UP (ref 7–23)
CALCIUM SERPL-MCNC: 9 MG/DL — SIGNIFICANT CHANGE UP (ref 8.4–10.5)
CHLORIDE SERPL-SCNC: 101 MMOL/L — SIGNIFICANT CHANGE UP (ref 96–108)
CO2 SERPL-SCNC: 25 MMOL/L — SIGNIFICANT CHANGE UP (ref 22–31)
CREAT SERPL-MCNC: 0.75 MG/DL — SIGNIFICANT CHANGE UP (ref 0.5–1.3)
GLUCOSE SERPL-MCNC: 122 MG/DL — HIGH (ref 70–99)
HCT VFR BLD CALC: 27.2 % — LOW (ref 39–50)
HGB BLD-MCNC: 8.4 G/DL — LOW (ref 13–17)
INR BLD: 1.16 RATIO — SIGNIFICANT CHANGE UP (ref 0.88–1.16)
MAGNESIUM SERPL-MCNC: 2 MG/DL — SIGNIFICANT CHANGE UP (ref 1.6–2.6)
MCHC RBC-ENTMCNC: 30.9 GM/DL — LOW (ref 32–36)
MCHC RBC-ENTMCNC: 32.3 PG — SIGNIFICANT CHANGE UP (ref 27–34)
MCV RBC AUTO: 104.6 FL — HIGH (ref 80–100)
NRBC # BLD: 0 /100 WBCS — SIGNIFICANT CHANGE UP (ref 0–0)
PHOSPHATE SERPL-MCNC: 3.9 MG/DL — SIGNIFICANT CHANGE UP (ref 2.5–4.5)
PLATELET # BLD AUTO: 313 K/UL — SIGNIFICANT CHANGE UP (ref 150–400)
POTASSIUM SERPL-MCNC: 4.2 MMOL/L — SIGNIFICANT CHANGE UP (ref 3.5–5.3)
POTASSIUM SERPL-SCNC: 4.2 MMOL/L — SIGNIFICANT CHANGE UP (ref 3.5–5.3)
PROTHROM AB SERPL-ACNC: 13.8 SEC — HIGH (ref 10.6–13.6)
RBC # BLD: 2.6 M/UL — LOW (ref 4.2–5.8)
RBC # FLD: 15.5 % — HIGH (ref 10.3–14.5)
RH IG SCN BLD-IMP: POSITIVE — SIGNIFICANT CHANGE UP
SODIUM SERPL-SCNC: 137 MMOL/L — SIGNIFICANT CHANGE UP (ref 135–145)
WBC # BLD: 4.66 K/UL — SIGNIFICANT CHANGE UP (ref 3.8–10.5)
WBC # FLD AUTO: 4.66 K/UL — SIGNIFICANT CHANGE UP (ref 3.8–10.5)

## 2021-09-28 PROCEDURE — 99233 SBSQ HOSP IP/OBS HIGH 50: CPT | Mod: GC

## 2021-09-28 PROCEDURE — 33244 REMOVE ELCTRD TRANSVENOUSLY: CPT

## 2021-09-28 PROCEDURE — 99232 SBSQ HOSP IP/OBS MODERATE 35: CPT

## 2021-09-28 PROCEDURE — 33241 REMOVE PULSE GENERATOR: CPT

## 2021-09-28 PROCEDURE — 99233 SBSQ HOSP IP/OBS HIGH 50: CPT | Mod: 25

## 2021-09-28 PROCEDURE — 99233 SBSQ HOSP IP/OBS HIGH 50: CPT

## 2021-09-28 RX ORDER — GABAPENTIN 400 MG/1
300 CAPSULE ORAL
Refills: 0 | Status: DISCONTINUED | OUTPATIENT
Start: 2021-09-28 | End: 2021-10-05

## 2021-09-28 RX ORDER — CEFAZOLIN SODIUM 1 G
2000 VIAL (EA) INJECTION EVERY 8 HOURS
Refills: 0 | Status: DISCONTINUED | OUTPATIENT
Start: 2021-09-28 | End: 2021-10-05

## 2021-09-28 RX ORDER — OXYCODONE AND ACETAMINOPHEN 5; 325 MG/1; MG/1
1 TABLET ORAL EVERY 4 HOURS
Refills: 0 | Status: DISCONTINUED | OUTPATIENT
Start: 2021-09-28 | End: 2021-10-04

## 2021-09-28 RX ORDER — LATANOPROST 0.05 MG/ML
1 SOLUTION/ DROPS OPHTHALMIC; TOPICAL AT BEDTIME
Refills: 0 | Status: DISCONTINUED | OUTPATIENT
Start: 2021-09-28 | End: 2021-10-05

## 2021-09-28 RX ORDER — FUROSEMIDE 40 MG
20 TABLET ORAL DAILY
Refills: 0 | Status: DISCONTINUED | OUTPATIENT
Start: 2021-09-28 | End: 2021-10-02

## 2021-09-28 RX ORDER — LIPASE/PROTEASE/AMYLASE 16-48-48K
2 CAPSULE,DELAYED RELEASE (ENTERIC COATED) ORAL
Refills: 0 | Status: DISCONTINUED | OUTPATIENT
Start: 2021-09-28 | End: 2021-10-05

## 2021-09-28 RX ORDER — TAMSULOSIN HYDROCHLORIDE 0.4 MG/1
0.4 CAPSULE ORAL AT BEDTIME
Refills: 0 | Status: DISCONTINUED | OUTPATIENT
Start: 2021-09-28 | End: 2021-10-05

## 2021-09-28 RX ORDER — SOTALOL HCL 120 MG
40 TABLET ORAL EVERY 12 HOURS
Refills: 0 | Status: DISCONTINUED | OUTPATIENT
Start: 2021-09-28 | End: 2021-10-05

## 2021-09-28 RX ORDER — HEPARIN SODIUM 5000 [USP'U]/ML
4000 INJECTION INTRAVENOUS; SUBCUTANEOUS EVERY 6 HOURS
Refills: 0 | Status: DISCONTINUED | OUTPATIENT
Start: 2021-09-28 | End: 2021-10-01

## 2021-09-28 RX ORDER — HEPARIN SODIUM 5000 [USP'U]/ML
8000 INJECTION INTRAVENOUS; SUBCUTANEOUS EVERY 6 HOURS
Refills: 0 | Status: DISCONTINUED | OUTPATIENT
Start: 2021-09-28 | End: 2021-10-01

## 2021-09-28 RX ORDER — ACETAMINOPHEN 500 MG
650 TABLET ORAL EVERY 4 HOURS
Refills: 0 | Status: DISCONTINUED | OUTPATIENT
Start: 2021-09-28 | End: 2021-10-05

## 2021-09-28 RX ORDER — SACUBITRIL AND VALSARTAN 24; 26 MG/1; MG/1
1 TABLET, FILM COATED ORAL
Refills: 0 | Status: DISCONTINUED | OUTPATIENT
Start: 2021-09-28 | End: 2021-10-02

## 2021-09-28 RX ORDER — KETOROLAC TROMETHAMINE 0.5 %
1 DROPS OPHTHALMIC (EYE) DAILY
Refills: 0 | Status: DISCONTINUED | OUTPATIENT
Start: 2021-09-28 | End: 2021-10-05

## 2021-09-28 RX ORDER — CHLORHEXIDINE GLUCONATE 213 G/1000ML
1 SOLUTION TOPICAL ONCE
Refills: 0 | Status: COMPLETED | OUTPATIENT
Start: 2021-09-28 | End: 2021-09-28

## 2021-09-28 RX ORDER — PANTOPRAZOLE SODIUM 20 MG/1
40 TABLET, DELAYED RELEASE ORAL
Refills: 0 | Status: DISCONTINUED | OUTPATIENT
Start: 2021-09-28 | End: 2021-10-05

## 2021-09-28 RX ORDER — HEPARIN SODIUM 5000 [USP'U]/ML
1000 INJECTION INTRAVENOUS; SUBCUTANEOUS
Qty: 25000 | Refills: 0 | Status: DISCONTINUED | OUTPATIENT
Start: 2021-09-29 | End: 2021-09-30

## 2021-09-28 RX ORDER — ACETAMINOPHEN 500 MG
650 TABLET ORAL ONCE
Refills: 0 | Status: COMPLETED | OUTPATIENT
Start: 2021-09-28 | End: 2021-09-28

## 2021-09-28 RX ORDER — PYRIDOXINE HCL (VITAMIN B6) 100 MG
100 TABLET ORAL DAILY
Refills: 0 | Status: DISCONTINUED | OUTPATIENT
Start: 2021-09-28 | End: 2021-10-05

## 2021-09-28 RX ORDER — FENTANYL CITRATE 50 UG/ML
25 INJECTION INTRAVENOUS
Refills: 0 | Status: DISCONTINUED | OUTPATIENT
Start: 2021-09-28 | End: 2021-09-28

## 2021-09-28 RX ORDER — DULOXETINE HYDROCHLORIDE 30 MG/1
60 CAPSULE, DELAYED RELEASE ORAL DAILY
Refills: 0 | Status: DISCONTINUED | OUTPATIENT
Start: 2021-09-28 | End: 2021-10-05

## 2021-09-28 RX ORDER — ALPRAZOLAM 0.25 MG
0.25 TABLET ORAL THREE TIMES A DAY
Refills: 0 | Status: DISCONTINUED | OUTPATIENT
Start: 2021-09-28 | End: 2021-10-05

## 2021-09-28 RX ORDER — SOTALOL HCL 120 MG
40 TABLET ORAL EVERY 12 HOURS
Refills: 0 | Status: DISCONTINUED | OUTPATIENT
Start: 2021-09-28 | End: 2021-09-28

## 2021-09-28 RX ORDER — LANOLIN ALCOHOL/MO/W.PET/CERES
6 CREAM (GRAM) TOPICAL AT BEDTIME
Refills: 0 | Status: DISCONTINUED | OUTPATIENT
Start: 2021-09-28 | End: 2021-10-05

## 2021-09-28 RX ORDER — OXYCODONE AND ACETAMINOPHEN 5; 325 MG/1; MG/1
2 TABLET ORAL EVERY 4 HOURS
Refills: 0 | Status: DISCONTINUED | OUTPATIENT
Start: 2021-09-28 | End: 2021-10-05

## 2021-09-28 RX ORDER — DORZOLAMIDE HYDROCHLORIDE TIMOLOL MALEATE 20; 5 MG/ML; MG/ML
1 SOLUTION/ DROPS OPHTHALMIC
Refills: 0 | Status: DISCONTINUED | OUTPATIENT
Start: 2021-09-28 | End: 2021-10-05

## 2021-09-28 RX ADMIN — Medication 0.25 MILLIGRAM(S): at 11:28

## 2021-09-28 RX ADMIN — Medication 1 TABLET(S): at 12:21

## 2021-09-28 RX ADMIN — CHLORHEXIDINE GLUCONATE 1 APPLICATION(S): 213 SOLUTION TOPICAL at 10:27

## 2021-09-28 RX ADMIN — Medication 100 MILLIGRAM(S): at 05:38

## 2021-09-28 RX ADMIN — DORZOLAMIDE HYDROCHLORIDE TIMOLOL MALEATE 1 DROP(S): 20; 5 SOLUTION/ DROPS OPHTHALMIC at 05:38

## 2021-09-28 RX ADMIN — Medication 650 MILLIGRAM(S): at 19:00

## 2021-09-28 RX ADMIN — Medication 6 MILLIGRAM(S): at 23:23

## 2021-09-28 RX ADMIN — OXYCODONE AND ACETAMINOPHEN 1 TABLET(S): 5; 325 TABLET ORAL at 10:10

## 2021-09-28 RX ADMIN — GABAPENTIN 300 MILLIGRAM(S): 400 CAPSULE ORAL at 05:37

## 2021-09-28 RX ADMIN — HEPARIN SODIUM 200 UNIT(S)/HR: 5000 INJECTION INTRAVENOUS; SUBCUTANEOUS at 04:17

## 2021-09-28 RX ADMIN — OXYCODONE AND ACETAMINOPHEN 2 TABLET(S): 5; 325 TABLET ORAL at 20:41

## 2021-09-28 RX ADMIN — OXYCODONE AND ACETAMINOPHEN 2 TABLET(S): 5; 325 TABLET ORAL at 20:49

## 2021-09-28 RX ADMIN — OXYCODONE AND ACETAMINOPHEN 1 TABLET(S): 5; 325 TABLET ORAL at 00:50

## 2021-09-28 RX ADMIN — Medication 650 MILLIGRAM(S): at 23:24

## 2021-09-28 RX ADMIN — TAMSULOSIN HYDROCHLORIDE 0.4 MILLIGRAM(S): 0.4 CAPSULE ORAL at 23:24

## 2021-09-28 RX ADMIN — DULOXETINE HYDROCHLORIDE 60 MILLIGRAM(S): 30 CAPSULE, DELAYED RELEASE ORAL at 12:21

## 2021-09-28 RX ADMIN — Medication 100 MILLIGRAM(S): at 13:05

## 2021-09-28 RX ADMIN — Medication 650 MILLIGRAM(S): at 23:40

## 2021-09-28 RX ADMIN — PANTOPRAZOLE SODIUM 40 MILLIGRAM(S): 20 TABLET, DELAYED RELEASE ORAL at 05:37

## 2021-09-28 RX ADMIN — Medication 0.25 MILLIGRAM(S): at 23:24

## 2021-09-28 RX ADMIN — HEPARIN SODIUM 4000 UNIT(S): 5000 INJECTION INTRAVENOUS; SUBCUTANEOUS at 04:21

## 2021-09-28 RX ADMIN — LATANOPROST 1 DROP(S): 0.05 SOLUTION/ DROPS OPHTHALMIC; TOPICAL at 23:23

## 2021-09-28 RX ADMIN — Medication 100 MILLIGRAM(S): at 12:21

## 2021-09-28 RX ADMIN — OXYCODONE AND ACETAMINOPHEN 1 TABLET(S): 5; 325 TABLET ORAL at 00:19

## 2021-09-28 RX ADMIN — Medication 2 CAPSULE(S): at 07:28

## 2021-09-28 RX ADMIN — FENTANYL CITRATE 25 MICROGRAM(S): 50 INJECTION INTRAVENOUS at 18:45

## 2021-09-28 RX ADMIN — Medication 1 DROP(S): at 12:21

## 2021-09-28 RX ADMIN — Medication 100 MILLIGRAM(S): at 21:24

## 2021-09-28 RX ADMIN — OXYCODONE AND ACETAMINOPHEN 1 TABLET(S): 5; 325 TABLET ORAL at 09:33

## 2021-09-28 RX ADMIN — Medication 0.25 MILLIGRAM(S): at 00:19

## 2021-09-28 RX ADMIN — Medication 650 MILLIGRAM(S): at 19:13

## 2021-09-28 NOTE — PROGRESS NOTE ADULT - PROBLEM SELECTOR PLAN 2
Currently saturating well on Room air without evidence of acute volume overload.   - TTE 9/18 not well visualized LV, severe Mitral stenosis, left atrial enlargement   - CORBIN 9/27 with estimated EF 30-35%  - continue sotalol with hold parameters  - continue entresto with hold parameters  - continue lasix with hold parameters  - Monitor and replete lytes, keep K>4, Mg>2.  - Strict Is/Os, daily weights.

## 2021-09-28 NOTE — PROGRESS NOTE ADULT - SUBJECTIVE AND OBJECTIVE BOX
PROGRESS NOTE:   Authored by Fady Saenz MD, PGY1 [facility] Pager [phone] [facility] pager [phone]    Patient is a 80y old  Male who presents with a chief complaint of osteomyelitis, c/f endocarditis (27 Sep 2021 16:06)      SUBJECTIVE / OVERNIGHT EVENTS:     REVIEW OF SYSTEMS:    CONSTITUTIONAL: No weakness, fevers or chills  EYES/ENT: No visual changes;  No vertigo or throat pain   NECK: No pain or stiffness  RESPIRATORY: No cough, wheezing, hemoptysis; No shortness of breath  CARDIOVASCULAR: No chest pain or palpitations  GASTROINTESTINAL: No abdominal or epigastric pain. No nausea, vomiting, or hematemesis; No diarrhea or constipation. No melena or hematochezia.  GENITOURINARY: No dysuria, frequency or hematuria  NEUROLOGICAL: No numbness or weakness  SKIN: No itching, rashes    MEDICATIONS  (STANDING):  ceFAZolin   IVPB 2000 milliGRAM(s) IV Intermittent every 8 hours  chlorhexidine 4% Liquid 1 Application(s) Topical daily  dorzolamide 2%/timolol 0.5% Ophthalmic Solution 1 Drop(s) Both EYES two times a day  DULoxetine 60 milliGRAM(s) Oral daily  furosemide    Tablet 20 milliGRAM(s) Oral daily  gabapentin 300 milliGRAM(s) Oral two times a day  heparin  Infusion. 1000 Unit(s)/Hr (10 mL/Hr) IV Continuous <Continuous>  influenza   Vaccine 0.5 milliLiter(s) IntraMuscular once  ketorolac 0.5% Ophthalmic Solution 1 Drop(s) Both EYES daily  latanoprost 0.005% Ophthalmic Solution 1 Drop(s) Both EYES at bedtime  melatonin 6 milliGRAM(s) Oral at bedtime  multivitamin 1 Tablet(s) Oral daily  pancrelipase  (CREON 36,000 Lipase Units) 2 Capsule(s) Oral three times a day with meals  pantoprazole    Tablet 40 milliGRAM(s) Oral before breakfast  pyridoxine 100 milliGRAM(s) Oral daily  sacubitril 49 mG/valsartan 51 mG 1 Tablet(s) Oral two times a day  sotalol 40 milliGRAM(s) Oral two times a day  tamsulosin 0.4 milliGRAM(s) Oral at bedtime    MEDICATIONS  (PRN):  acetaminophen   Tablet .. 650 milliGRAM(s) Oral every 6 hours PRN Temp greater or equal to 38C (100.4F), Mild Pain (1 - 3)  ALPRAZolam 0.25 milliGRAM(s) Oral three times a day PRN Anxiety/agitation  heparin   Injectable 8000 Unit(s) IV Push every 6 hours PRN For aPTT less than 40  heparin   Injectable 4000 Unit(s) IV Push every 6 hours PRN For aPTT between 40 - 57  oxycodone    5 mG/acetaminophen 325 mG 1 Tablet(s) Oral every 6 hours PRN Moderate Pain (4 - 6)      CAPILLARY BLOOD GLUCOSE        I&O's Summary      PHYSICAL EXAM:  Vital Signs Last 24 Hrs  T(C): 36.3 (28 Sep 2021 04:49), Max: 36.7 (27 Sep 2021 19:59)  T(F): 97.3 (28 Sep 2021 04:49), Max: 98.1 (27 Sep 2021 19:59)  HR: 81 (28 Sep 2021 04:49) (69 - 83)  BP: 95/54 (28 Sep 2021 04:49) (95/54 - 125/75)  BP(mean): --  RR: 18 (28 Sep 2021 04:49) (18 - 18)  SpO2: 92% (28 Sep 2021 04:49) (92% - 98%)    CONSTITUTIONAL: NAD, well-developed  RESPIRATORY: Normal respiratory effort; lungs are clear to auscultation bilaterally  CARDIOVASCULAR: Regular rate and rhythm, normal S1 and S2, no murmur/rub/gallop; No lower extremity edema; Peripheral pulses are 2+ bilaterally  ABDOMEN: Nontender to palpation, normoactive bowel sounds, no rebound/guarding; No hepatosplenomegaly  MUSCLOSKELETAL: no clubbing or cyanosis of digits; no joint swelling or tenderness to palpation  PSYCH: A+O to person, place, and time; affect appropriate  NEURO: Non-focal, no tremors  SKIN: No rashes    LABS:                        8.4    4.66  )-----------( 313      ( 28 Sep 2021 06:06 )             27.2     09-28    137  |  101  |  14  ----------------------------<  122<H>  4.2   |  25  |  0.75    Ca    9.0      28 Sep 2021 06:06  Phos  3.9     09-28  Mg     2.0     09-28      PT/INR - ( 28 Sep 2021 06:06 )   PT: 13.8 sec;   INR: 1.16 ratio         PTT - ( 28 Sep 2021 03:02 )  PTT:57.1 sec            RADIOLOGY & ADDITIONAL TESTS:  No new imaging or tests    COORDINATION OF CARE:  Care Discussed with Consultants/Other Providers [Y/N]:  Prior or Outpatient Records Reviewed [Y/N]:   PROGRESS NOTE:   Authored by Fady Saenz MD, PGY1 [facility] Pager [phone] [facility] pager [phone]    Patient is a 80y old  Male who presents with a chief complaint of osteomyelitis, c/f endocarditis (27 Sep 2021 16:06)      SUBJECTIVE / OVERNIGHT EVENTS: No acute events overnight. Pt notes feeling well today. No acute complaints. Amputation site a bit sore, pt taking pain meds 2x/day.     REVIEW OF SYSTEMS:    CONSTITUTIONAL: No weakness, fevers or chills  EYES/ENT: No visual changes;  No vertigo or throat pain   NECK: No pain or stiffness  RESPIRATORY: No cough, wheezing, hemoptysis; No shortness of breath  CARDIOVASCULAR: No chest pain or palpitations  GASTROINTESTINAL: No abdominal or epigastric pain. No nausea, vomiting, or hematemesis; No diarrhea or constipation. No melena or hematochezia.  GENITOURINARY: No dysuria, frequency or hematuria  NEUROLOGICAL: No numbness or weakness  SKIN: No itching, rashes. +Soreness at amputation site    MEDICATIONS  (STANDING):  ceFAZolin   IVPB 2000 milliGRAM(s) IV Intermittent every 8 hours  chlorhexidine 4% Liquid 1 Application(s) Topical daily  dorzolamide 2%/timolol 0.5% Ophthalmic Solution 1 Drop(s) Both EYES two times a day  DULoxetine 60 milliGRAM(s) Oral daily  furosemide    Tablet 20 milliGRAM(s) Oral daily  gabapentin 300 milliGRAM(s) Oral two times a day  heparin  Infusion. 1000 Unit(s)/Hr (10 mL/Hr) IV Continuous <Continuous>  influenza   Vaccine 0.5 milliLiter(s) IntraMuscular once  ketorolac 0.5% Ophthalmic Solution 1 Drop(s) Both EYES daily  latanoprost 0.005% Ophthalmic Solution 1 Drop(s) Both EYES at bedtime  melatonin 6 milliGRAM(s) Oral at bedtime  multivitamin 1 Tablet(s) Oral daily  pancrelipase  (CREON 36,000 Lipase Units) 2 Capsule(s) Oral three times a day with meals  pantoprazole    Tablet 40 milliGRAM(s) Oral before breakfast  pyridoxine 100 milliGRAM(s) Oral daily  sacubitril 49 mG/valsartan 51 mG 1 Tablet(s) Oral two times a day  sotalol 40 milliGRAM(s) Oral two times a day  tamsulosin 0.4 milliGRAM(s) Oral at bedtime    MEDICATIONS  (PRN):  acetaminophen   Tablet .. 650 milliGRAM(s) Oral every 6 hours PRN Temp greater or equal to 38C (100.4F), Mild Pain (1 - 3)  ALPRAZolam 0.25 milliGRAM(s) Oral three times a day PRN Anxiety/agitation  heparin   Injectable 8000 Unit(s) IV Push every 6 hours PRN For aPTT less than 40  heparin   Injectable 4000 Unit(s) IV Push every 6 hours PRN For aPTT between 40 - 57  oxycodone    5 mG/acetaminophen 325 mG 1 Tablet(s) Oral every 6 hours PRN Moderate Pain (4 - 6)      CAPILLARY BLOOD GLUCOSE        I&O's Summary      PHYSICAL EXAM:  Vital Signs Last 24 Hrs  T(C): 36.3 (28 Sep 2021 04:49), Max: 36.7 (27 Sep 2021 19:59)  T(F): 97.3 (28 Sep 2021 04:49), Max: 98.1 (27 Sep 2021 19:59)  HR: 81 (28 Sep 2021 04:49) (69 - 83)  BP: 95/54 (28 Sep 2021 04:49) (95/54 - 125/75)  BP(mean): --  RR: 18 (28 Sep 2021 04:49) (18 - 18)  SpO2: 92% (28 Sep 2021 04:49) (92% - 98%)    CONSTITUTIONAL: NAD, well-developed  RESPIRATORY: Normal respiratory effort; lungs are clear to auscultation bilaterally  CARDIOVASCULAR: Regular rate and rhythm, normal S1 and S2, no murmur/rub/gallop; No lower extremity edema; Peripheral pulses are 2+ bilaterally  ABDOMEN: Nontender to palpation, normoactive bowel sounds, no rebound/guarding; No hepatosplenomegaly  MUSCLOSKELETAL: no clubbing or cyanosis of digits; RLE dressing appears clean and dry. No LE swelling, redness, or TTP. Good sensation lower extremity.   PSYCH: A+O to person, place, and time; affect appropriate  NEURO: Non-focal, no tremors  SKIN: No rashes    LABS:                        8.4    4.66  )-----------( 313      ( 28 Sep 2021 06:06 )             27.2     09-28    137  |  101  |  14  ----------------------------<  122<H>  4.2   |  25  |  0.75    Ca    9.0      28 Sep 2021 06:06  Phos  3.9     09-28  Mg     2.0     09-28      PT/INR - ( 28 Sep 2021 06:06 )   PT: 13.8 sec;   INR: 1.16 ratio         PTT - ( 28 Sep 2021 03:02 )  PTT:57.1 sec            RADIOLOGY & ADDITIONAL TESTS:  No new imaging or tests    COORDINATION OF CARE:  Care Discussed with Consultants/Other Providers [Y/N]:  Prior or Outpatient Records Reviewed [Y/N]:

## 2021-09-28 NOTE — PROGRESS NOTE ADULT - PROBLEM SELECTOR PLAN 3
- Bilateral PE in 7/2020, on Xarelto   - Holding xarelto, on heparin drip in setting of upcoming procedures  -Will restart xarelto when able.

## 2021-09-28 NOTE — PROGRESS NOTE ADULT - SUBJECTIVE AND OBJECTIVE BOX
Follow Up:  Bacteremia    Interval History:    REVIEW OF SYSTEMS  [  ] ROS unobtainable because:    [  ] All other systems negative except as noted below    Constitutional:  [ ] fever [ ] chills  [ ] weight loss  [ ] weakness  Skin:  [ ] rash [ ] phlebitis	  Eyes: [ ] icterus [ ] pain  [ ] discharge	  ENMT: [ ] sore throat  [ ] thrush [ ] ulcers [ ] exudates  Respiratory: [ ] dyspnea [ ] hemoptysis [ ] cough [ ] sputum	  Cardiovascular:  [ ] chest pain [ ] palpitations [ ] edema	  Gastrointestinal:  [ ] nausea [ ] vomiting [ ] diarrhea [ ] constipation [ ] pain	  Genitourinary:  [ ] dysuria [ ] frequency [ ] hematuria [ ] discharge [ ] flank pain  [ ] incontinence  Musculoskeletal:  [ ] myalgias [ ] arthralgias [ ] arthritis  [ ] back pain  Neurological:  [ ] headache [ ] seizures  [ ] confusion/altered mental status    Allergies  No Known Allergies        ANTIMICROBIALS:      OTHER MEDS:  MEDICATIONS  (STANDING):  influenza   Vaccine 0.5 once      Vital Signs Last 24 Hrs  T(C): 36.9 (28 Sep 2021 15:39), Max: 36.9 (28 Sep 2021 11:05)  T(F): 98.4 (28 Sep 2021 15:39), Max: 98.5 (28 Sep 2021 11:05)  HR: 85 (28 Sep 2021 15:39) (77 - 85)  BP: 105/62 (28 Sep 2021 15:39) (94/60 - 105/62)  BP(mean): --  RR: 18 (28 Sep 2021 15:39) (18 - 18)  SpO2: 96% (28 Sep 2021 15:39) (92% - 98%)    PHYSICAL EXAMINATION:  General: Alert and Awake, NAD  HEENT: PERRL, EOMI  Neck: Supple  Cardiac: RRR, No M/R/G  Resp: CTAB, No Wh/Rh/Ra  Abdomen: NBS, NT/ND, No HSM, No rigidity or guarding  MSK: No LE edema. No Calf tenderness  : No hernandez  Skin: No rashes or lesions. Skin is warm and dry to the touch.   Neuro: Alert and Awake. CN 2-12 Grossly intact. Moves all four extremities spontaneously.  Psych: Calm, Pleasant, Cooperative                          8.4    4.66  )-----------( 313      ( 28 Sep 2021 06:06 )             27.2       09-28    137  |  101  |  14  ----------------------------<  122<H>  4.2   |  25  |  0.75    Ca    9.0      28 Sep 2021 06:06  Phos  3.9     09-28  Mg     2.0     09-28            MICROBIOLOGY:  v  .Tissue Right hallux bone culutre  09-21-21   Few Pseudomonas aeruginosa  Rare Staphylococcus pettenkoferi  --  Pseudomonas aeruginosa  Staphylococcus pettenkoferi      .Tissue right first metatarsal bone cu  09-21-21   Rare Pseudomonas aeruginosa  Rare Staphylococcus pettenkoferi  --  Pseudomonas aeruginosa  Staphylococcus pettenkoferi      .Blood Blood-Peripheral  09-16-21   No Growth Final  --  --      .Tissue right hallux bone culture  09-16-21   Few Staphylococcus aureus  --  Staphylococcus aureus      Clean Catch Clean Catch (Midstream)  09-15-21   <10,000 CFU/mL Normal Urogenital Juani  --  --      .Blood Blood-Peripheral  09-15-21   Growth in aerobic bottle: Staphylococcus aureus  See previous culture [id number]  --  Blood Culture PCR  Staphylococcus aureus    RADIOLOGY:    <The imaging below has been reviewed and visualized by me independently. Findings as detailed in report below>    < from: Xray Foot AP + Lateral + Oblique, Right (09.20.21 @ 16:37) >  IMPRESSION:    Post transmetatarsal amputation first hallux.    < end of copied text >   Follow Up:  Bacteremia    Interval History: afebrile. planned for ICD extraction    REVIEW OF SYSTEMS  [  ] ROS unobtainable because:    [ x ] All other systems negative except as noted below    Constitutional:  [ ] fever [ ] chills  [ ] weight loss  [ ] weakness  Skin:  [ ] rash [ ] phlebitis	  Eyes: [ ] icterus [ ] pain  [ ] discharge	  ENMT: [ ] sore throat  [ ] thrush [ ] ulcers [ ] exudates  Respiratory: [ ] dyspnea [ ] hemoptysis [ ] cough [ ] sputum	  Cardiovascular:  [ ] chest pain [ ] palpitations [ ] edema	  Gastrointestinal:  [ ] nausea [ ] vomiting [ ] diarrhea [ ] constipation [ ] pain	  Genitourinary:  [ ] dysuria [ ] frequency [ ] hematuria [ ] discharge [ ] flank pain  [ ] incontinence  Musculoskeletal:  [ ] myalgias [ ] arthralgias [ ] arthritis  [ ] back pain  Neurological:  [ ] headache [ ] seizures  [ ] confusion/altered mental status    Allergies  No Known Allergies        ANTIMICROBIALS:      OTHER MEDS:  MEDICATIONS  (STANDING):  influenza   Vaccine 0.5 once      Vital Signs Last 24 Hrs  T(C): 36.9 (28 Sep 2021 15:39), Max: 36.9 (28 Sep 2021 11:05)  T(F): 98.4 (28 Sep 2021 15:39), Max: 98.5 (28 Sep 2021 11:05)  HR: 85 (28 Sep 2021 15:39) (77 - 85)  BP: 105/62 (28 Sep 2021 15:39) (94/60 - 105/62)  BP(mean): --  RR: 18 (28 Sep 2021 15:39) (18 - 18)  SpO2: 96% (28 Sep 2021 15:39) (92% - 98%)    PHYSICAL EXAMINATION:  General: Alert and Awake, NAD  Cardiac: RRR, No M/R/G  Resp: CTAB, No Wh/Rh/Ra  Abdomen: NBS, NT/ND, No HSM, No rigidity or guarding  MSK: +R. foot wound dressing c/d/i,  : No hernandez  Skin: No rashes or lesions. Skin is warm and dry to the touch.   Neuro: Alert and Awake. CN 2-12 Grossly intact. Moves all four extremities spontaneously.  Psych: Calm, Pleasant, Cooperative  Vasc: R Chest mediport, L Chest ICD                          8.4    4.66  )-----------( 313      ( 28 Sep 2021 06:06 )             27.2       09-28    137  |  101  |  14  ----------------------------<  122<H>  4.2   |  25  |  0.75    Ca    9.0      28 Sep 2021 06:06  Phos  3.9     09-28  Mg     2.0     09-28            MICROBIOLOGY:  v  .Tissue Right hallux bone culutre  09-21-21   Few Pseudomonas aeruginosa  Rare Staphylococcus pettenkoferi  --  Pseudomonas aeruginosa  Staphylococcus pettenkoferi      .Tissue right first metatarsal bone cu  09-21-21   Rare Pseudomonas aeruginosa  Rare Staphylococcus pettenkoferi  --  Pseudomonas aeruginosa  Staphylococcus pettenkoferi      .Blood Blood-Peripheral  09-16-21   No Growth Final  --  --      .Tissue right hallux bone culture  09-16-21   Few Staphylococcus aureus  --  Staphylococcus aureus      Clean Catch Clean Catch (Midstream)  09-15-21   <10,000 CFU/mL Normal Urogenital Juani  --  --      .Blood Blood-Peripheral  09-15-21   Growth in aerobic bottle: Staphylococcus aureus  See previous culture [id number]  --  Blood Culture PCR  Staphylococcus aureus    RADIOLOGY:    <The imaging below has been reviewed and visualized by me independently. Findings as detailed in report below>    < from: Xray Foot AP + Lateral + Oblique, Right (09.20.21 @ 16:37) >  IMPRESSION:    Post transmetatarsal amputation first hallux.    < end of copied text >

## 2021-09-28 NOTE — PROGRESS NOTE ADULT - SUBJECTIVE AND OBJECTIVE BOX
[facility]    Milo Thomas, Adolfo, Binta, Brittanie, Arian, Javier      [phone]    CHIEF COMPLAINT: Patient is a 80y old  Male who presents with a chief complaint of osteomylitis, c/f endocarditis (28 Sep 2021 07:10)      Follow Up: staph bacteremia, planned for icd extraction    Interim history: The patient reports no new symptoms.  Denies chest discomfort and shortness of breath.  No abdominal pain.  No new neurologic symptoms.      MEDICATIONS  (STANDING):  ceFAZolin   IVPB 2000 milliGRAM(s) IV Intermittent every 8 hours  chlorhexidine 4% Liquid 1 Application(s) Topical daily  chlorhexidine 4% Liquid 1 Application(s) Topical once  dorzolamide 2%/timolol 0.5% Ophthalmic Solution 1 Drop(s) Both EYES two times a day  DULoxetine 60 milliGRAM(s) Oral daily  furosemide    Tablet 20 milliGRAM(s) Oral daily  gabapentin 300 milliGRAM(s) Oral two times a day  influenza   Vaccine 0.5 milliLiter(s) IntraMuscular once  ketorolac 0.5% Ophthalmic Solution 1 Drop(s) Both EYES daily  latanoprost 0.005% Ophthalmic Solution 1 Drop(s) Both EYES at bedtime  melatonin 6 milliGRAM(s) Oral at bedtime  multivitamin 1 Tablet(s) Oral daily  pancrelipase  (CREON 36,000 Lipase Units) 2 Capsule(s) Oral three times a day with meals  pantoprazole    Tablet 40 milliGRAM(s) Oral before breakfast  pyridoxine 100 milliGRAM(s) Oral daily  sacubitril 49 mG/valsartan 51 mG 1 Tablet(s) Oral two times a day  sotalol 40 milliGRAM(s) Oral every 12 hours  tamsulosin 0.4 milliGRAM(s) Oral at bedtime    MEDICATIONS  (PRN):  acetaminophen   Tablet .. 650 milliGRAM(s) Oral every 6 hours PRN Temp greater or equal to 38C (100.4F), Mild Pain (1 - 3)  ALPRAZolam 0.25 milliGRAM(s) Oral three times a day PRN Anxiety/agitation  oxycodone    5 mG/acetaminophen 325 mG 1 Tablet(s) Oral every 6 hours PRN Moderate Pain (4 - 6)      REVIEW OF SYSTEMS:  eye, ent, GI, , allergic, dermatologic, musculoskeletal and neurologic are negative except as described above    Vital Signs Last 24 Hrs  T(C): 36.7 (28 Sep 2021 08:20), Max: 36.7 (27 Sep 2021 19:59)  T(F): 98.1 (28 Sep 2021 08:20), Max: 98.1 (27 Sep 2021 19:59)  HR: 82 (28 Sep 2021 08:20) (69 - 83)  BP: 94/60 (28 Sep 2021 08:20) (94/60 - 125/75)  BP(mean): --  RR: 18 (28 Sep 2021 08:20) (18 - 18)  SpO2: 95% (28 Sep 2021 08:20) (92% - 98%)    I&O's Summary      Telemetry past 24h: sr    PHYSICAL EXAM:    Constitutional: well-nourished, well-developed, NAD   HEENT:  MMM, sclerae anicteric, conjunctivae clear, no oral cyanosis.  Pulmonary: Non-labored, breath sounds are clear bilaterally, No wheezing, rales or rhonchi  Cardiovascular: Regular, S1 and S2.  No murmur.  No rubs, gallops or clicks  Gastrointestinal: Bowel Sounds present, soft, nontender.   Lymph: No peripheral edema. right foot bandage cdi  Neurological: Alert, no focal deficits  Skin: No rashes.  Psych:  Mood & affect appropriate    LABS: All Labs Reviewed:                        8.4    4.66  )-----------( 313      ( 28 Sep 2021 06:06 )             27.2                         9.1    4.15  )-----------( 338      ( 27 Sep 2021 18:51 )             28.8                         9.1    3.64  )-----------( 347      ( 27 Sep 2021 07:24 )             29.2     28 Sep 2021 06:06    137    |  101    |  14     ----------------------------<  122    4.2     |  25     |  0.75   27 Sep 2021 07:24    136    |  99     |  15     ----------------------------<  91     4.3     |  25     |  0.81   26 Sep 2021 08:03    138    |  104    |  13     ----------------------------<  94     4.3     |  29     |  0.82     Ca    9.0        28 Sep 2021 06:06  Ca    9.3        27 Sep 2021 07:24  Ca    8.6        26 Sep 2021 08:03  Phos  3.9       28 Sep 2021 06:06  Phos  4.1       27 Sep 2021 07:24  Phos  4.3       26 Sep 2021 08:03  Mg     2.0       28 Sep 2021 06:06  Mg     1.8       27 Sep 2021 07:24  Mg     1.9       26 Sep 2021 08:03      PT/INR - ( 28 Sep 2021 06:06 )   PT: 13.8 sec;   INR: 1.16 ratio         PTT - ( 28 Sep 2021 03:02 )  PTT:57.1 sec      Blood Culture:         RADIOLOGY:    EKG:    Echo:    < from: Transesophageal Echocardiogram w/o TTE (09.27.21 @ 09:25) >    Patient name: JUANIS DE SANTIAGO  YOB: 1940   Age: 80 (M)   MR#: [id number]  Study Date: 9/27/2021  Location: [address] [place], [place] [id number]: Mayo Bolton M.D.  Study quality: Technically good  Referring Physician: Robyn Conner MD  Blood Pressure: 93/51 mmHg  Height: 185 cm  Weight: 95 kg  BSA: 2.2 m2  Heart Rhythm: Normal sinus  ------------------------------------------------------------------------  PROCEDURE: Transesophageal (CORBIN) was performed.  Informed  consent was first obtained for CORBIN. The patient was sedated  - see anesthesia record.  The procedure was monitored with  automatic blood pressure monitoring, ECG tracings and pulse  oximetry. The transesophageal probe was placed in the  esophagus posterior to the heart without complications.  INDICATION: Acute and subacute infective endocarditis  (I33.0)  ------------------------------------------------------------------------  Dimensions:    Normal Values:  LA:            2.0 - 4.0 cm  Ao:            2.0 - 3.8 cm  SEPTUM:        0.6 - 1.2 cm  PWT:           0.6 - 1.1 cm  LVIDd:         3.0 - 5.6 cm  LVIDs:         1.8 - 4.0 cm  EF (Visual Estimate): 30-35 %  Doppler Peak Velocity (m/sec): AoV=1.9 TV=2.7  ------------------------------------------------------------------------  Observations:  Mitral Valve: Severe mitral annular and leaflet  calcification.  Moderate mitral stenosis. Mean gradient 5.8 mmHg at  68/min.  Mild-moderate mitral regurgitation.  Aortic Valve/Aorta: s/p transcatheter aortic valve  replacement. Leaflet motion is normal.  No aortic valvular or paravalvular regurgitation seen.  Normal size aortic root, arch, and descending thoracic  aorta.  Mild atherosclerosis. No mobile plaque.  Left Atrium: Severe left atrial enlargement.  Left Ventricle: Moderate left ventricular enlargement.  Estimated ejection fraction 30-35%.  Right Heart: Moderate right atrial enlargement. Prominent  Chiari network. Normal right ventricular size and function.    A device wire is noted in the right heart.  Normal tricuspid valve. Mild tricuspid regurgitation.  Normal pulmonic valve. Minimal pulmonic regurgitation.  Pericardium/Pleura: Normal pericardium with no pericardial  effusion.  Hemodynamic: Mild pulmonary hypertension.  Color Doppler doemonstratesthe presence of a patent  foramen ovale.  ------------------------------------------------------------------------  Conclusions:  Moderate left ventricular enlargement. Estimated ejection  fraction 30-35%.  Moderate mitral stenosis due to annular andleaflet  calcification. Mild-moderate mitral regurgitation.  s/p transcatheter aortic valve replacement. Leaflet motion  is normal. No aortic valvular or paravalvular regurgitation  seen.  A device wire is noted in the right heart.  No vegetations seen.  ------------------------------------------------------------------------  Confirmed on  9/27/2021 - 11:18:14 by Mayo Bolton MD, FASE  ------------------------------------------------------------------------    < end of copied text >

## 2021-09-28 NOTE — PROGRESS NOTE ADULT - ASSESSMENT
Mr. Gatica is a 80 year old male with PMH pancreatic cancer (dx 3/2021, currently undergoing chemo), HTN, HLD, CHF (unknown EF), CAD s/p stents x2 (~15 years ago), defibrillator, TAVR (4/2021), bilateral PE (7/2021) on Xarelto, spinal stenosis, GERD, BPH, macular degeneration initially presented to [facility] c/o R foot pain admitted for R hallux pressure injury and RLE cellulitis found to have MSSA bacteremia and acute OM of right hallux. Given recent TAVR, transfered [facility] for CORBIN and consideration of ICD extraction.

## 2021-09-28 NOTE — PROGRESS NOTE ADULT - PROBLEM SELECTOR PLAN 4
Likely anemia of chronic disease in setting of pancreatic cancer  - H/h stable  - Currently hemodynamically stable, monitor for signs and symptoms of active bleeding especially while on therapeutic AC  - Transfuse for hemoglobin <8  -Maintain active type and screen

## 2021-09-28 NOTE — PROGRESS NOTE ADULT - ASSESSMENT
80M PMH of pancreatic cancer (3/2021), HTN, HLD, CHF, CAD s/p stents, ICD, TAVR, b/l PE, spinal stenosis s/p dylon placement, GERD, BPH, L. hip replacement x3, presenting with R. hallux OM s/p amputation.     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   s/p R hallux amputation on 9/20  Operative Cultures with CoNS and Pseudomonas but pathology clear  Unclear if these cultures are dirty bone or clean bone  However, in light of negative path and stable exam would not cover for the Pseudomonas for now    Concerning context for MSSA Bacteremia given mediport, AICD and TAVR  CORBIN without vegetations  Planned for AICD removal by EPS  Given risk for seeding would also remove mediport  Would treat for at least 4 weeks of ancef from negative BCx for MSSA Bacteremia    Overall, MSSA Bacteremia, Toe OM, Positive Culture Finding (Pseudomonas), AICD in place, Mediport in place, s/p TAVR 80M PMH of pancreatic cancer (3/2021), HTN, HLD, CHF, CAD s/p stents, ICD, TAVR, b/l PE, spinal stenosis s/p dylon placement, GERD, BPH, L. hip replacement x3, presenting with R. hallux OM s/p amputation.     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   s/p R hallux amputation on 9/20  Operative Cultures with CoNS and Pseudomonas but pathology clear  Unclear if these cultures are dirty bone or clean bone  However, in light of negative path and stable exam would not cover for the Pseudomonas for now    Concerning context for MSSA Bacteremia given mediport, AICD and TAVR  CORBIN without vegetations    Overall, MSSA Bacteremia, Toe OM, Positive Culture Finding (Pseudomonas), AICD in place, Mediport in place, s/p TAVR    -Continue Cefazolin 2g IV Q8H  -Planned for AICD removal by EPS  -Given risk for seeding would also remove mediport  -Would treat for at least 4 weeks of ancef from negative BCx for MSSA Bacteremia    I will continue to follow. Please feel free to contact me with any further questions.    Sanjay Andres M.D.  [facility] Division of Infectious Disease  8AM-5PM: Pager Number [phone]  After Hours (or if no response): Please contact the Infectious Diseases Office at [phone]

## 2021-09-28 NOTE — PROGRESS NOTE ADULT - PROBLEM SELECTOR PLAN 5
- ICD placed >20 years ago, pt had a very fast HR in the setting of a viral infection, but unclear exactly what arrhythmia  - Denied cardiac arrest, suspect sustained VT with pulse and had ICD placement  - Continue sotalol BID  -ICD interrogated at OSH, no shocks recorded, ICD functioning battery life 2.5 years; Vpaced <1%  - Plan for ICD extraction as above.   -Pt with apparent AICD fired x 1 overnight 9/25, pt asymptomatic, denied chest pain other than feeling shock. Will monitor. Currently sinus with minimal v-pacing on tele - ICD placed >20 years ago, pt had a very fast HR in the setting of a viral infection, but unclear exactly what arrhythmia  - Denied cardiac arrest, suspect sustained VT with pulse and had ICD placement  - Continue sotalol BID  -ICD interrogated at OSH, no shocks recorded, ICD functioning battery life 2.5 years; Vpaced <1%  - Plan for ICD extraction as above.   -Pt with apparent AICD fired x 1 overnight 9/25, pt asymptomatic, denied chest pain other than feeling shock. Will monitor. Currently sinus with minimal v-pacing on tele. Given ICD extraction pt will likely need life vest on discharge until new ICD can be placed.

## 2021-09-28 NOTE — PROGRESS NOTE ADULT - ASSESSMENT
80 year old male with PMH pancreatic cancer (dx 3/2021, currently undergoing chemo), HTN, HLD, CHFrEF, CAD s/p stents x2 (~15 years ago), Abbott ICD with Riata lead from 1/19/05, TAVR (4/2021), bilateral PE (7/2021) on Xarelto, spinal stenosis, GERD, BPH, macular degeneration initially presented to [facility] c/o R foot pain admitted for R hallux pressure injury and RLE cellulitis found to have MSSA bacteremia and acute OM of right hallux. At [place], S/p R. hallux amputation 9/20 and RRT 9/23 for ruptured hematoma @ surgical site during PT, wound irrigated and packed, left open to heal by secondary intention. Bcx 9/15 growing MSSA, with clearance on Bcx 9/16. Given recent TAVR, transfered [facility] for CORBIN and consideration of ICD extraction. Device interrogation revealed one episode of paroxysmal AT 9/26/21 at ~200bpm. Met rate for VT zone. Received ATPx4 & 1 36J shock s/p termination. No other therapies. Pt states he has never received shocks post implant.     1. RLE cellulitis and acute OM of right hallux s/p Rt 1st partial ray resection.   2. Chronic systolic HF s/p single chamber Abbott ICD.   3. HTN  4. s/p TAVR 4/2021  5. Bilateral PE (7/2021)     -MSSA bacteremia on admission BCx 9/15; repeat BCx from 9/16 are NGTD. Currently on Cefazolin. ID following  -CORBIN showed no vegetations seen.  -Currently on Sotalol. Unclear indication, will obtain records from [facility].  -Keep pt NPO for ICD system extraction later today (3rd case)  -Heparin held since 9:10am today  -Pt is consented for procedure  -No Heparin/Lovenox products post extraction until clearance from LAWRENCE River NP-C  [phone]    80 year old male with PMH pancreatic cancer (dx 3/2021, currently undergoing chemo), HTN, HLD, CHFrEF, CAD s/p stents x2 (~15 years ago), Abbott ICD with Riata lead from 1/19/05, TAVR (4/2021), bilateral PE (7/2021) on Xarelto, spinal stenosis, GERD, BPH, macular degeneration initially presented to [facility] c/o R foot pain admitted for R hallux pressure injury and RLE cellulitis found to have MSSA bacteremia and acute OM of right hallux. At [place], S/p R. hallux amputation 9/20 and RRT 9/23 for ruptured hematoma @ surgical site during PT, wound irrigated and packed, left open to heal by secondary intention. Bcx 9/15 growing MSSA, with clearance on Bcx 9/16. Given recent TAVR, transfered [facility] for CORBIN and consideration of ICD extraction. Device interrogation revealed one episode of paroxysmal AT 9/26/21 at ~200bpm. Met rate for VT zone. Received ATPx4 & 1 36J shock s/p termination. No other therapies. Pt states he has never received shocks post implant.     1. RLE cellulitis and acute OM of right hallux s/p Rt 1st partial ray resection.   2. Chronic systolic HF s/p single chamber Abbott ICD.   3. HTN  4. s/p TAVR 4/2021  5. Bilateral PE (7/2021)     -MSSA bacteremia on admission BCx 9/15; repeat BCx from 9/16 are NGTD. Currently on Cefazolin. ID following  -CORBIN showed no vegetations seen.  -Currently on Sotalol. Unclear indication, will obtain records from [facility].  -Keep pt NPO for ICD system extraction later today (3rd case)  -Heparin held since 9:10am today  -Pt is consented for procedure  -No Heparin/Lovenox products post extraction until clearance from EP     Addendum (6:17pm): Pt s/p ICD extraction. Intra-op CORBIN did not reveal vegetations but did show moderate mitral stenosis. Can restart Heparin drip tomorrow morning at 7am (no initial bolus). Also spoke to Pt's EP Attending (Dr. May) at [facility], he maintains on low dose sotalol due to patient had sustained VT in 2016 that was pace terminated by ATP. Will continue home dose sotalol at 40mg Q12 Hr.      BHUMIKA River, NP-C  [phone]

## 2021-09-28 NOTE — CHART NOTE - NSCHARTNOTEFT_GEN_A_CORE
BRIEF PROCEDURE NOTE    JUANIS DE SANTIAGO  [id number]    Pre-op Diagnosis:   MSSA bacteremia  Single Chamber ICD  Cardiomyopathy EF of 30-35 %    Post-op Diagnosis: Same    Procedure: ICD/Lead extraction,     Electrophysiologist: Virgie Quiles MD    Fellow:  THIERRY Peralta MD     Anesthesia: GA    Access: Right Femoral vein access     Description:  6Fr sheath RFV using Seldinger technique  Amplatz wire to RIJ serve as guide for bridge balloon if needed    Local with 0.5% Marcain  ICD pocket opened/ICD removed  Lead freed from scar in pocket, anchors removed  Active fixation retracted, lead cut  Locking stylet placed distally and #5 silk tied to outside of lead    Lead removed with 16 Fr laser sheath    Complications: none    EBL: 10cc    Disposition: to recovery/stable    Plan:  ECG  CXR  Bed rest for 3 hours.  Continue Cefazolin per infectious disease recommendation BRIEF PROCEDURE NOTE    JUANIS DE SANTIAGO  [id number]    Pre-op Diagnosis:   MSSA bacteremia  Single Chamber ICD  Cardiomyopathy EF of 30-35 %    Post-op Diagnosis: Same    Procedure: ICD/Lead extraction,     Electrophysiologist: Virgie Quiles MD    Fellow:  THIERRY Peralta MD     Anesthesia: GA    Access: Right Femoral vein access     Description:  6Fr sheath RFV using Seldinger technique  Amplatz wire to RIJ serve as guide for bridge balloon if needed    Local with 0.5% Marcain  ICD pocket opened/ICD removed  Lead freed from scar in pocket, anchors removed  Active fixation retracted, lead cut  Locking stylet placed distally and #5 silk tied to outside of lead    Lead removed with 16 Fr laser sheath    Complications: none    EBL: 10cc    Disposition: to recovery/stable    Plan:  ECG  CXR  Bed rest for 4 hours.  Continue Cefazolin per infectious disease recommendation

## 2021-09-28 NOTE — PROGRESS NOTE ADULT - ATTENDING COMMENTS
above plans discussed with Dr. Saenz    # MSSA bacteremia  # RLE cellulitis/OM of hallux  # concern for ICD infection  # chronic HFrEF  # PE/DVT on xarelto    - CORBIN with no clear vegetation but given high risk, EP planning on extraction of ICD today  - plan for ICD extraction on 9/28  - continue IV ancef via PICC line (will not need total 4 week course); ID consulted  - IR consulted for removal of mediport; no plan for chemoRx for now  - no signs of volume overload at this time; continue home dose lasix 20mg daily  - conitnue IV heparin while waiting for procedure  - PT consult: previously recommended for TRINA Feng MD  Division of Hospital Medicine  Cell: [phone]  Office: [phone]

## 2021-09-28 NOTE — PROGRESS NOTE ADULT - ASSESSMENT
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    Cellulitis of right lower leg and Acute OM of right hallux  - s/p right 1st partial ray resection.  Tolerated procedure well with no obvious cardiac complications  - Pain control  - MSSA bacteremia on admission BCx; repeat BCx from 9/16 are NGTD  - ID recommended CORBIN in the setting of TAVR and ICD. no vegetations, ef 30-35  -planned for extraction tomorrow   -decision on timing of replacement of the explanted device tbd  - Abx per ID.  Follow recs    Pulmonary embolism.   - Bilateral PE in 7/2020, on Xarelto   - stopped  Xarelto, on heparin  gtt pending CORBIN / device extraction    Chronic systolic HF S/P ICD, MS    -moderate lv dysfxn and moderate ms based on annular calcification  - Continue Entresto   - cont lasix for now   - No signs of acute volume overload. Tolerating Room air  - Strict I/Os, daily weights  - Monitor and replete Lytes. Keep K > 4 and Mg > 2  - Unclear why on sotalol, continue for now.  outpatient records from [facility] have been requested.  Follows with Dr. Manoj Perez in [facility]    HTN  - continue sotalol with hold parameters  - continue entresto with hold parameters  - if bp remains soft hold lasix temporarily     - Monitor and replete lytes, keep K>4, Mg>2.  - All other medical needs as per primary team.  - Other cardiovascular workup will depend on clinical course.  - Will continue to follow.

## 2021-09-28 NOTE — PROGRESS NOTE ADULT - PROBLEM SELECTOR PLAN 1
With RLE cellulitis  - Initial blood cultures 9/15 positive for MSSA bacteremia  - Repeat BCx from 9/16 NGTD  - S/p R. hallux amputation 9/20  Surgical pathology of right hallux positive for acute osteomyelitis, MSSA   - Continue cefazolin 2g q8h per ID recs at [place]. Plan is to continue 4-6 weeks abx depending on result of CORBIN (as late as 10/27)  - S/P PICC line placement 9/22 RUE  - Pain well controlled w/ current regimen   - S/p RRT 9/23 for ruptured hematoma @ surgical site during PT, wound irrigated and packed, left open to heal by secondary intention  - Outpatient podiatrist dr Felipe. Podiatry reconsulted. Will f/u recs.   - Cardiology/EP following. CORBIN performed 9/27 without evidence of vegetations. Plan for ICD extraction Wednesday AM. Will send COVID swab today, maintain active T&S, hold heparin midnight and NPO after midnight tonight.   -Id following. Will appreciate further recs.   -Per ID pt should also have mediport taken out to prevent recurrence. With RLE cellulitis  - Initial blood cultures 9/15 positive for MSSA bacteremia  - Repeat BCx from 9/16 NGTD  - S/p R. hallux amputation 9/20  Surgical pathology of right hallux positive for acute osteomyelitis, MSSA   - Continue cefazolin 2g q8h per ID recs at [place]. Plan is to continue 4-6 weeks abx depending on result of CORBIN (as late as 10/27)  - S/P PICC line placement 9/22 RUE  - Pain well controlled w/ current regimen   - S/p RRT 9/23 for ruptured hematoma @ surgical site during PT, wound irrigated and packed, left open to heal by secondary intention  - Outpatient podiatrist dr Felipe. Podiatry reconsulted. Will f/u recs.   - Cardiology/EP following. CORBIN performed 9/27 without evidence of vegetations. Plan for ICD extraction Wednesday AM. Will send COVID swab today, maintain active T&S, hold heparin midnight and NPO after midnight tonight.   -Id following. Will appreciate further recs.   -Per ID pt should also have mediport taken out to prevent recurrence. IR will perform mediport removal on 9/30.

## 2021-09-28 NOTE — CONSULT NOTE ADULT - ASSESSMENT
Vascular & Interventional Radiology Brief Consult Note    Evaluate for Procedure: Mediport removal     HPI: 80y Male with pancreatic cancer on chemo, bilateral PEs on therapeutic anticoagulation, and foot osteomyelitis with recent MSSA bacteremia.      Allergies:   Medications (Abx/Cardiac/Anticoagulation/Blood Products)  ceFAZolin   IVPB: 100 mL/Hr IV Intermittent ( @ 05:38)  furosemide    Tablet: 20 milliGRAM(s) Oral ( @ 05:10)  heparin  Infusion.: 1000 Unit(s)/Hr IV Continuous ( @ 21:04)  heparin  Infusion.: 200 Unit(s)/Hr IV Continuous ( @ 19:40)  sacubitril 49 mG/valsartan 51 m Tablet(s) Oral ( @ 17:45)  sotalol: 40 milliGRAM(s) Oral ( @ 17:45)  tamsulosin: 0.4 milliGRAM(s) Oral ( @ 21:09)    Data:    T(C): 36.9  HR: 81  BP: 101/58  RR: 18  SpO2: 95%    -WBC 4.66 / HgB 8.4 / Hct 27.2 / Plt 313  -Na 137 / Cl 101 / BUN 14 / Glucose 122  -K 4.2 / CO2 25 / Cr 0.75  -ALT -- / Alk Phos -- / T.Bili --  -INR 1.16 / PTT --    Assessment/Plan:   81 y/o male with pancreatic cancer on chemo, bilateral PEs on therapeutic anticoagulation, and foot osteomyelitis with recent MSSA bacteremia. IR consulted for Mediport removal at the recommendation of ID.     -Please place IR procedure order under Dr. Danial Parmar   -NPO at midnight prior to procedure  -Please order AM CBC, BMP, coags morning of procedure  -IR will reach out on morning of procedure regarding when to hold heparin   -Tentatively plan for procedure on 2021

## 2021-09-28 NOTE — CHART NOTE - NSCHARTNOTEFT_GEN_A_CORE
Spoke with pt's outpatient cardiologist dr. Perez. Updated him on pt's hospital course and plan for ICD extraction. Notes Pt has a hx of CHF and non-sustained Vtach, for which ICD was placed. Pt is on Sotalol for atrial fibrilation per dr. Perez.

## 2021-09-28 NOTE — PROGRESS NOTE ADULT - SUBJECTIVE AND OBJECTIVE BOX
24H hour events: Pt without complaint, no acute events overnight, Tele: SR at 80-90 bpm     MEDICATIONS:  furosemide    Tablet 20 milliGRAM(s) Oral daily  sacubitril 49 mG/valsartan 51 mG 1 Tablet(s) Oral two times a day  sotalol 40 milliGRAM(s) Oral every 12 hours  tamsulosin 0.4 milliGRAM(s) Oral at bedtime  ceFAZolin   IVPB 2000 milliGRAM(s) IV Intermittent every 8 hours  acetaminophen   Tablet .. 650 milliGRAM(s) Oral every 6 hours PRN  ALPRAZolam 0.25 milliGRAM(s) Oral three times a day PRN  DULoxetine 60 milliGRAM(s) Oral daily  gabapentin 300 milliGRAM(s) Oral two times a day  melatonin 6 milliGRAM(s) Oral at bedtime  oxycodone    5 mG/acetaminophen 325 mG 1 Tablet(s) Oral every 6 hours PRN  pancrelipase  (CREON 36,000 Lipase Units) 2 Capsule(s) Oral three times a day with meals  pantoprazole    Tablet 40 milliGRAM(s) Oral before breakfast  chlorhexidine 4% Liquid 1 Application(s) Topical daily  dorzolamide 2%/timolol 0.5% Ophthalmic Solution 1 Drop(s) Both EYES two times a day  influenza   Vaccine 0.5 milliLiter(s) IntraMuscular once  ketorolac 0.5% Ophthalmic Solution 1 Drop(s) Both EYES daily  latanoprost 0.005% Ophthalmic Solution 1 Drop(s) Both EYES at bedtime  multivitamin 1 Tablet(s) Oral daily  pyridoxine 100 milliGRAM(s) Oral daily      REVIEW OF SYSTEMS:  Complete 10point ROS negative.    PHYSICAL EXAM:  T(C): 36.9 (09-28-21 @ 11:05), Max: 36.9 (09-28-21 @ 11:05)  HR: 81 (09-28-21 @ 11:05) (69 - 83)  BP: 101/58 (09-28-21 @ 11:05) (94/60 - 125/75)  RR: 18 (09-28-21 @ 11:05) (18 - 18)  SpO2: 95% (09-28-21 @ 11:05) (92% - 98%)  Wt(kg): --  I&O's Summary      Appearance: Normal	  HEENT: Neck supple   Cardiovascular: Normal S1 S2, No JVD, No murmurs  Respiratory: Lungs clear to auscultation	  Psychiatry: A & O x 3, Mood & affect appropriate  Gastrointestinal:  Soft, Non-tender, + BS	  Skin: No rashes. Right arm PICC line site C/D/I  Extremities: No edema  Vascular: Peripheral pulses palpable 2+ bilaterally      LABS:	 	    CBC Full  -  ( 28 Sep 2021 06:06 )  WBC Count : 4.66 K/uL  Hemoglobin : 8.4 g/dL  Hematocrit : 27.2 %  Platelet Count - Automated : 313 K/uL  Mean Cell Volume : 104.6 fl  Mean Cell Hemoglobin : 32.3 pg  Mean Cell Hemoglobin Concentration : 30.9 gm/dL      09-28    137  |  101  |  14  ----------------------------<  122<H>  4.2   |  25  |  0.75  09-27    136  |  99  |  15  ----------------------------<  91  4.3   |  25  |  0.81    Ca    9.0      28 Sep 2021 06:06  Ca    9.3      27 Sep 2021 07:24  Phos  3.9     09-28  Phos  4.1     09-27  Mg     2.0     09-28  Mg     1.8     09-27      TELEMETRY: SR at 80-90 bpm 	      < from: Transesophageal Echocardiogram w/o TTE (09.27.21 @ 09:25) >  Dimensions:    Normal Values:  LA:            2.0 - 4.0 cm  Ao:            2.0 - 3.8 cm  SEPTUM:        0.6 - 1.2 cm  PWT:           0.6 - 1.1 cm  LVIDd:         3.0 - 5.6 cm  LVIDs:         1.8 - 4.0 cm  EF (Visual Estimate): 30-35 %  Doppler Peak Velocity (m/sec): AoV=1.9 TV=2.7  ------------------------------------------------------------------------  Observations:  Mitral Valve: Severe mitral annular and leaflet  calcification.  Moderate mitral stenosis. Mean gradient 5.8 mmHg at  68/min.  Mild-moderate mitral regurgitation.  Aortic Valve/Aorta: s/p transcatheter aortic valve  replacement. Leaflet motion is normal.  No aortic valvular or paravalvular regurgitation seen.  Normal size aortic root, arch, and descending thoracic  aorta.  Mild atherosclerosis. No mobile plaque.  Left Atrium: Severe left atrial enlargement.  Left Ventricle: Moderate left ventricular enlargement.  Estimated ejection fraction 30-35%.  Right Heart: Moderate right atrial enlargement. Prominent  Chiari network. Normal right ventricular size and function.    A device wire is noted in the right heart.  Normal tricuspid valve. Mild tricuspid regurgitation.  Normal pulmonic valve. Minimal pulmonic regurgitation.  Pericardium/Pleura: Normal pericardium with no pericardial  effusion.  Hemodynamic: Mild pulmonary hypertension.  Color Doppler doemonstratesthe presence of a patent  foramen ovale.  ------------------------------------------------------------------------  Conclusions:Moderate left ventricular enlargement. Estimated ejection  fraction 30-35%.  Moderate mitral stenosis due to annular andleaflet  calcification. Mild-moderate mitral regurgitation.  s/p transcatheter aortic valve replacement. Leaflet motion  is normal. No aortic valvular or paravalvular regurgitation  seen.  A device wire is noted in the right heart.  No vegetations seen.    < end of copied text >

## 2021-09-28 NOTE — PROGRESS NOTE ADULT - PROBLEM SELECTOR PLAN 8
- Patient with complaints of coughing/choking on food on 9/18  - S/S consulted at OSH, recs dysphagia 3 soft-thin liquids. Reconsulted at [facility] and recommended pt be restarted on normal diet.  -Aspiration precautions

## 2021-09-28 NOTE — PROGRESS NOTE ADULT - PROBLEM SELECTOR PLAN 7
- Diagnosed in March 2021, on chemotherapy (does not recall name of medication)  - Continue home Pancrelipase 36,000 2 tabs PO TID with meals  - Per podiatry, Dr. López spoke with Dr. Murphy at [facility] to be postponed until after resolution of acute OM infection  -Pain meds as above

## 2021-09-29 ENCOUNTER — TRANSCRIPTION ENCOUNTER (OUTPATIENT)
Age: 81
End: 2021-09-29

## 2021-09-29 LAB
ANION GAP SERPL CALC-SCNC: 10 MMOL/L — SIGNIFICANT CHANGE UP (ref 5–17)
APTT BLD: 31.5 SEC — SIGNIFICANT CHANGE UP (ref 27.5–35.5)
APTT BLD: 44.2 SEC — HIGH (ref 27.5–35.5)
APTT BLD: 67.3 SEC — HIGH (ref 27.5–35.5)
BUN SERPL-MCNC: 14 MG/DL — SIGNIFICANT CHANGE UP (ref 7–23)
CALCIUM SERPL-MCNC: 9.2 MG/DL — SIGNIFICANT CHANGE UP (ref 8.4–10.5)
CHLORIDE SERPL-SCNC: 101 MMOL/L — SIGNIFICANT CHANGE UP (ref 96–108)
CO2 SERPL-SCNC: 25 MMOL/L — SIGNIFICANT CHANGE UP (ref 22–31)
CREAT SERPL-MCNC: 0.75 MG/DL — SIGNIFICANT CHANGE UP (ref 0.5–1.3)
GLUCOSE SERPL-MCNC: 92 MG/DL — SIGNIFICANT CHANGE UP (ref 70–99)
HCT VFR BLD CALC: 28.1 % — LOW (ref 39–50)
HCT VFR BLD CALC: 30.8 % — LOW (ref 39–50)
HGB BLD-MCNC: 8.8 G/DL — LOW (ref 13–17)
HGB BLD-MCNC: 9.3 G/DL — LOW (ref 13–17)
INR BLD: 1.09 RATIO — SIGNIFICANT CHANGE UP (ref 0.88–1.16)
MAGNESIUM SERPL-MCNC: 1.9 MG/DL — SIGNIFICANT CHANGE UP (ref 1.6–2.6)
MCHC RBC-ENTMCNC: 30.2 GM/DL — LOW (ref 32–36)
MCHC RBC-ENTMCNC: 31.3 GM/DL — LOW (ref 32–36)
MCHC RBC-ENTMCNC: 31.8 PG — SIGNIFICANT CHANGE UP (ref 27–34)
MCHC RBC-ENTMCNC: 33 PG — SIGNIFICANT CHANGE UP (ref 27–34)
MCV RBC AUTO: 105.2 FL — HIGH (ref 80–100)
MCV RBC AUTO: 105.5 FL — HIGH (ref 80–100)
NIGHT BLUE STAIN TISS: SIGNIFICANT CHANGE UP
NRBC # BLD: 0 /100 WBCS — SIGNIFICANT CHANGE UP (ref 0–0)
NRBC # BLD: 0 /100 WBCS — SIGNIFICANT CHANGE UP (ref 0–0)
PHOSPHATE SERPL-MCNC: 4.5 MG/DL — SIGNIFICANT CHANGE UP (ref 2.5–4.5)
PLATELET # BLD AUTO: 309 K/UL — SIGNIFICANT CHANGE UP (ref 150–400)
PLATELET # BLD AUTO: 317 K/UL — SIGNIFICANT CHANGE UP (ref 150–400)
POTASSIUM SERPL-MCNC: 4.6 MMOL/L — SIGNIFICANT CHANGE UP (ref 3.5–5.3)
POTASSIUM SERPL-SCNC: 4.6 MMOL/L — SIGNIFICANT CHANGE UP (ref 3.5–5.3)
PROTHROM AB SERPL-ACNC: 13 SEC — SIGNIFICANT CHANGE UP (ref 10.6–13.6)
RBC # BLD: 2.67 M/UL — LOW (ref 4.2–5.8)
RBC # BLD: 2.92 M/UL — LOW (ref 4.2–5.8)
RBC # FLD: 15.4 % — HIGH (ref 10.3–14.5)
RBC # FLD: 15.4 % — HIGH (ref 10.3–14.5)
SODIUM SERPL-SCNC: 136 MMOL/L — SIGNIFICANT CHANGE UP (ref 135–145)
SPECIMEN SOURCE: SIGNIFICANT CHANGE UP
WBC # BLD: 5.18 K/UL — SIGNIFICANT CHANGE UP (ref 3.8–10.5)
WBC # BLD: 6.44 K/UL — SIGNIFICANT CHANGE UP (ref 3.8–10.5)
WBC # FLD AUTO: 5.18 K/UL — SIGNIFICANT CHANGE UP (ref 3.8–10.5)
WBC # FLD AUTO: 6.44 K/UL — SIGNIFICANT CHANGE UP (ref 3.8–10.5)

## 2021-09-29 PROCEDURE — 71046 X-RAY EXAM CHEST 2 VIEWS: CPT | Mod: 26

## 2021-09-29 PROCEDURE — 93010 ELECTROCARDIOGRAM REPORT: CPT

## 2021-09-29 PROCEDURE — 99233 SBSQ HOSP IP/OBS HIGH 50: CPT | Mod: 25

## 2021-09-29 PROCEDURE — 99233 SBSQ HOSP IP/OBS HIGH 50: CPT

## 2021-09-29 PROCEDURE — 99233 SBSQ HOSP IP/OBS HIGH 50: CPT | Mod: GC

## 2021-09-29 PROCEDURE — 99232 SBSQ HOSP IP/OBS MODERATE 35: CPT

## 2021-09-29 RX ORDER — MAGNESIUM SULFATE 500 MG/ML
1 VIAL (ML) INJECTION ONCE
Refills: 0 | Status: COMPLETED | OUTPATIENT
Start: 2021-09-29 | End: 2021-09-29

## 2021-09-29 RX ORDER — CHLORHEXIDINE GLUCONATE 213 G/1000ML
1 SOLUTION TOPICAL
Refills: 0 | Status: DISCONTINUED | OUTPATIENT
Start: 2021-09-29 | End: 2021-10-05

## 2021-09-29 RX ADMIN — SACUBITRIL AND VALSARTAN 1 TABLET(S): 24; 26 TABLET, FILM COATED ORAL at 18:17

## 2021-09-29 RX ADMIN — LATANOPROST 1 DROP(S): 0.05 SOLUTION/ DROPS OPHTHALMIC; TOPICAL at 21:37

## 2021-09-29 RX ADMIN — Medication 2 CAPSULE(S): at 07:58

## 2021-09-29 RX ADMIN — HEPARIN SODIUM 1000 UNIT(S)/HR: 5000 INJECTION INTRAVENOUS; SUBCUTANEOUS at 14:05

## 2021-09-29 RX ADMIN — Medication 1 TABLET(S): at 11:49

## 2021-09-29 RX ADMIN — Medication 40 MILLIGRAM(S): at 05:34

## 2021-09-29 RX ADMIN — Medication 2 CAPSULE(S): at 18:16

## 2021-09-29 RX ADMIN — OXYCODONE AND ACETAMINOPHEN 2 TABLET(S): 5; 325 TABLET ORAL at 19:30

## 2021-09-29 RX ADMIN — OXYCODONE AND ACETAMINOPHEN 2 TABLET(S): 5; 325 TABLET ORAL at 14:00

## 2021-09-29 RX ADMIN — OXYCODONE AND ACETAMINOPHEN 2 TABLET(S): 5; 325 TABLET ORAL at 03:14

## 2021-09-29 RX ADMIN — OXYCODONE AND ACETAMINOPHEN 1 TABLET(S): 5; 325 TABLET ORAL at 21:33

## 2021-09-29 RX ADMIN — Medication 100 MILLIGRAM(S): at 11:49

## 2021-09-29 RX ADMIN — Medication 2 CAPSULE(S): at 11:48

## 2021-09-29 RX ADMIN — Medication 0.25 MILLIGRAM(S): at 22:52

## 2021-09-29 RX ADMIN — DORZOLAMIDE HYDROCHLORIDE TIMOLOL MALEATE 1 DROP(S): 20; 5 SOLUTION/ DROPS OPHTHALMIC at 18:15

## 2021-09-29 RX ADMIN — TAMSULOSIN HYDROCHLORIDE 0.4 MILLIGRAM(S): 0.4 CAPSULE ORAL at 21:37

## 2021-09-29 RX ADMIN — Medication 1 DROP(S): at 11:50

## 2021-09-29 RX ADMIN — OXYCODONE AND ACETAMINOPHEN 2 TABLET(S): 5; 325 TABLET ORAL at 02:22

## 2021-09-29 RX ADMIN — Medication 100 MILLIGRAM(S): at 05:32

## 2021-09-29 RX ADMIN — Medication 40 MILLIGRAM(S): at 18:16

## 2021-09-29 RX ADMIN — PANTOPRAZOLE SODIUM 40 MILLIGRAM(S): 20 TABLET, DELAYED RELEASE ORAL at 05:33

## 2021-09-29 RX ADMIN — Medication 100 GRAM(S): at 10:30

## 2021-09-29 RX ADMIN — HEPARIN SODIUM 1000 UNIT(S)/HR: 5000 INJECTION INTRAVENOUS; SUBCUTANEOUS at 07:02

## 2021-09-29 RX ADMIN — Medication 100 MILLIGRAM(S): at 21:37

## 2021-09-29 RX ADMIN — DULOXETINE HYDROCHLORIDE 60 MILLIGRAM(S): 30 CAPSULE, DELAYED RELEASE ORAL at 11:48

## 2021-09-29 RX ADMIN — GABAPENTIN 300 MILLIGRAM(S): 400 CAPSULE ORAL at 05:34

## 2021-09-29 RX ADMIN — Medication 6 MILLIGRAM(S): at 21:37

## 2021-09-29 RX ADMIN — GABAPENTIN 300 MILLIGRAM(S): 400 CAPSULE ORAL at 18:17

## 2021-09-29 RX ADMIN — Medication 20 MILLIGRAM(S): at 05:33

## 2021-09-29 RX ADMIN — DORZOLAMIDE HYDROCHLORIDE TIMOLOL MALEATE 1 DROP(S): 20; 5 SOLUTION/ DROPS OPHTHALMIC at 05:33

## 2021-09-29 RX ADMIN — OXYCODONE AND ACETAMINOPHEN 2 TABLET(S): 5; 325 TABLET ORAL at 10:00

## 2021-09-29 RX ADMIN — OXYCODONE AND ACETAMINOPHEN 1 TABLET(S): 5; 325 TABLET ORAL at 22:03

## 2021-09-29 RX ADMIN — OXYCODONE AND ACETAMINOPHEN 2 TABLET(S): 5; 325 TABLET ORAL at 13:00

## 2021-09-29 RX ADMIN — HEPARIN SODIUM 4000 UNIT(S): 5000 INJECTION INTRAVENOUS; SUBCUTANEOUS at 21:30

## 2021-09-29 RX ADMIN — OXYCODONE AND ACETAMINOPHEN 2 TABLET(S): 5; 325 TABLET ORAL at 18:30

## 2021-09-29 RX ADMIN — HEPARIN SODIUM 1200 UNIT(S)/HR: 5000 INJECTION INTRAVENOUS; SUBCUTANEOUS at 21:31

## 2021-09-29 RX ADMIN — OXYCODONE AND ACETAMINOPHEN 2 TABLET(S): 5; 325 TABLET ORAL at 09:04

## 2021-09-29 RX ADMIN — SACUBITRIL AND VALSARTAN 1 TABLET(S): 24; 26 TABLET, FILM COATED ORAL at 05:33

## 2021-09-29 RX ADMIN — Medication 100 MILLIGRAM(S): at 14:04

## 2021-09-29 NOTE — DISCHARGE NOTE PROVIDER - NSDCFUSCHEDAPPT_GEN_ALL_CORE_FT
JUANIS DE ASNTIAGO ; 10/08/2021 ; [facility]  JUNAIS DE SANTIAGO ; 10/15/2021 ; [facility]

## 2021-09-29 NOTE — DISCHARGE NOTE PROVIDER - NSDCFUADDINST_GEN_ALL_CORE_FT
Podiatry Discharge Instructions:  Follow up: Please follow up with own podiatrist or Dr. Tim within 1 week of discharge from the hospital, please call [phone] for appointment and discuss that you recently were seen in the hospital.  Wound Care: Please apply to right foot wound 1/4" plain packing, dress with 4x4 gauze and milady daily   Weight bearing: Please weight bear as tolerated to right heel in a surgical shoe.  Antibiotics: Please continue as instructed.

## 2021-09-29 NOTE — PROGRESS NOTE ADULT - PROBLEM SELECTOR PLAN 5
- ICD placed >20 years ago, pt had a very fast HR in the setting of a viral infection, but unclear exactly what arrhythmia  - Denied cardiac arrest, suspect sustained VT with pulse and had ICD placement  - Continue sotalol BID  -ICD interrogated at OSH, no shocks recorded, ICD functioning battery life 2.5 years; Vpaced <1%  - Plan for ICD extraction as above.   -Pt with apparent AICD fired x 1 overnight 9/25, pt asymptomatic, denied chest pain other than feeling shock. Will monitor. Currently sinus with minimal v-pacing on tele. Given ICD extraction pt will likely need life vest on discharge until new ICD can be placed. - ICD placed >20 years ago, pt had a very fast HR in the setting of a viral infection, but unclear exactly what arrhythmia  - Denied cardiac arrest, suspect sustained VT with pulse and had ICD placement  - Continue sotalol BID  -ICD interrogated at OSH, no shocks recorded, ICD functioning battery life 2.5 years; Vpaced <1%  - ICD extracted as above.   -Pt with apparent AICD fired x 1 overnight 9/25, pt asymptomatic, denied chest pain other than feeling shock. Will monitor. Currently sinus with minimal v-pacing on tele. Given ICD extraction pt will likely need life vest on discharge until new ICD can be placed. - ICD placed >20 years ago, pt had a very fast HR in the setting of a viral infection, but unclear exactly what arrhythmia  - Denied cardiac arrest, suspect sustained VT with pulse and had ICD placement  - Continue sotalol BID  -ICD interrogated at OSH, no shocks recorded, ICD functioning battery life 2.5 years; Vpaced <1%  - ICD extracted as above.   -Pt with apparent AICD fired x 1 overnight 9/25, pt asymptomatic, denied chest pain other than feeling shock. Will monitor. Currently sinus with minimal v-pacing on tele. Given ICD extraction pt will likely need life vest on discharge until new ICD can be placed. Can also consider subcutaneous ICD if cleared by ID. Will d/w ID today.

## 2021-09-29 NOTE — PROGRESS NOTE ADULT - ASSESSMENT
Mr. Gatica is a 80 year old male with PMH pancreatic cancer (dx 3/2021, currently undergoing chemo), HTN, HLD, CHF (unknown EF), CAD s/p stents x2 (~15 years ago), defibrillator, TAVR (4/2021), bilateral PE (7/2021) on Xarelto, spinal stenosis, GERD, BPH, macular degeneration initially presented to [facility] c/o R foot pain admitted for R hallux pressure injury and RLE cellulitis found to have MSSA bacteremia and acute OM of right hallux. Given recent TAVR, transfered [facility] for CORBIN and consideration of ICD extraction.

## 2021-09-29 NOTE — DISCHARGE NOTE PROVIDER - NSDCFUADDAPPT_GEN_ALL_CORE_FT
Please follow up with your internal medicine doctor, podiatrist, hematologist/oncologist, and general surgery following discharge from rehab. Please follow up with your internal medicine doctor, podiatrist, cardiologist, and cardiac electrophysiologist, hematologist/oncologist, and general surgery following discharge from rehab.

## 2021-09-29 NOTE — PROGRESS NOTE ADULT - ASSESSMENT
80 year old male with PMH pancreatic cancer (dx 3/2021, currently undergoing chemo), HTN, HLD, CHFrEF, CAD s/p stents x2 (~15 years ago), Abbott ICD with Riata lead from 1/19/05, TAVR (4/2021), bilateral PE (7/2021) on Xarelto, spinal stenosis, GERD, BPH, macular degeneration initially presented to [facility] c/o R foot pain admitted for R hallux pressure injury and RLE cellulitis found to have MSSA bacteremia and acute OM of right hallux. At [place], S/p R. hallux amputation 9/20 and RRT 9/23 for ruptured hematoma @ surgical site during PT, wound irrigated and packed, left open to heal by secondary intention. Bcx 9/15 growing MSSA, with clearance on Bcx 9/16. Given recent TAVR, transfered [facility] for CORBIN and consideration of ICD extraction. Device interrogation revealed one episode of paroxysmal AT 9/26/21 at ~200bpm. Met rate for VT zone. Received ATPx4 & 1 36J shock s/p termination. No other therapies. Pt states he has never received shocks post implant.     1. RLE cellulitis and acute OM of right hallux s/p Rt 1st partial ray resection.   2. Chronic systolic HF s/p single chamber Abbott ICD.   3. HTN  4. s/p TAVR 4/2021  5. Bilateral PE (7/2021)     - MSSA bacteremia on admission BCx 9/15; repeat BCx from 9/16 are NGTD. CORBIN without vegetations.  - Pt is s/p ICD extraction 9/28/21. Wound site with dressing in place. C/D/I. Plan to remove dressing tomorrow.   - PA/lateral CXR pending today.   - Please get ECG.   - Replete lytes. Keep MG>2, K>4.   - on AC with heparin, resumed this AM (no bolus).  - Continue home dose Sotalol 40mg q12 hr. He maintains on low dose sotalol due to hx of sustained VT in 2016 that was pace terminated by ATP.   - Antibiotics per ID. On Cefazolin.   - Pt's LVEF 35%, hx of VT. Lifevest vs S-ICD prior to discharge. Per primary team, plan to discharge to rehab Friday. Discussed with pt, he prefers S-ICD at this time. Will need ID clearance. Will discuss with Dr. Quiles.    80 year old male with PMH pancreatic cancer (dx 3/2021, currently undergoing chemo), HTN, HLD, CHFrEF, CAD s/p stents x2 (~15 years ago), Abbott ICD with Riata lead from 1/19/05, TAVR (4/2021), bilateral PE (7/2021) on Xarelto, spinal stenosis, GERD, BPH, macular degeneration initially presented to [facility] c/o R foot pain admitted for R hallux pressure injury and RLE cellulitis found to have MSSA bacteremia and acute OM of right hallux. At [place], S/p R. hallux amputation 9/20 and RRT 9/23 for ruptured hematoma @ surgical site during PT, wound irrigated and packed, left open to heal by secondary intention. Bcx 9/15 growing MSSA, with clearance on Bcx 9/16. Given recent TAVR, transfered [facility] for CORBIN and consideration of ICD extraction. Device interrogation revealed one episode of paroxysmal AT 9/26/21 at ~200bpm. Met rate for VT zone. Received ATPx4 & 1 36J shock s/p termination. No other therapies. Pt states he has never received shocks post implant.     1. RLE cellulitis and acute OM of right hallux s/p Rt 1st partial ray resection.   2. Chronic systolic HF s/p single chamber Abbott ICD.   3. HTN  4. s/p TAVR 4/2021  5. Bilateral PE (7/2021)     - MSSA bacteremia on admission BCx 9/15; repeat BCx from 9/16 are NGTD. CORBIN without vegetations.  - Pt is s/p ICD extraction 9/28/21. Wound site with dressing in place. C/D/I. Plan to remove dressing tomorrow.   - PA/lateral CXR pending today.   - Please get ECG.   - Replete lytes. Keep MG>2, K>4.   - on AC with heparin, resumed this AM (no bolus).  - Continue home dose Sotalol 40mg q12 hr. He maintains on low dose sotalol due to hx of sustained VT in 2016 that was pace terminated by ATP.   - Antibiotics per ID. On Cefazolin.   - Pt's LVEF 35%, hx of VT. Lifevest vs S-ICD prior to discharge. Per primary team, plan to discharge to rehab Friday. Discussed both options with patient, he is leaning towards S-ICD. Will need ID clearance. Will discuss with Dr. Quiles.

## 2021-09-29 NOTE — DISCHARGE NOTE PROVIDER - NSDCCPCAREPLAN_GEN_ALL_CORE_FT
PRINCIPAL DISCHARGE DIAGNOSIS  Diagnosis: Acute osteomyelitis  Assessment and Plan of Treatment: You came to the hospital at [place] for a right leg cellulitis and wound on your toe and were found to have an infection within the bone as well as the skin requiring amputation of your toe. The bacteria, known as staph (but not MRSA) was also growing in your blood. You required IV antibiotics in addition to amputation for treatment. You had a PICC line placed for administration of this antibiotic when you leave the hospital, which shoulc be continued as- Cefazolin 2 grams every 8 hours for total of 4 weeks from blood culture clearance ending on 10/15. We also rlemoved your ICD and mediport over concern for possible bacterial infection of these devices. You should continue to take your antibiotics as prescribed and follow up with your PCP and infectious disease within in one week of discharge from rehab.      SECONDARY DISCHARGE DIAGNOSES  Diagnosis: History of cardiac arrhythmia  Assessment and Plan of Treatment:     Diagnosis: Chronic systolic congestive heart failure  Assessment and Plan of Treatment:     Diagnosis: Anemia  Assessment and Plan of Treatment:     Diagnosis: Pancreatic cancer  Assessment and Plan of Treatment:      PRINCIPAL DISCHARGE DIAGNOSIS  Diagnosis: Acute osteomyelitis  Assessment and Plan of Treatment: You came to the hospital at [place] for a right leg cellulitis and wound on your toe and were found to have an infection within the bone as well as the skin requiring amputation of your toe. The bacteria, known as staph (but not MRSA) was also growing in your blood. You required IV antibiotics in addition to amputation for treatment. You had a PICC line placed for administration of this antibiotic when you leave the hospital, which shoulc be continued as- Cefazolin 2 grams every 8 hours for total of 4 weeks from blood culture clearance ending on 10/15. We also rlemoved your ICD and mediport over concern for possible bacterial infection of these devices. You should continue to take your antibiotics as prescribed and follow up with your PCP and infectious disease within in one week of discharge from rehab.      SECONDARY DISCHARGE DIAGNOSES  Diagnosis: History of cardiac arrhythmia  Assessment and Plan of Treatment: You have a history of an abnormal heart rhythm, for which you have an implanted defibrilator. We removed this defibrilator and replaced it with a new one, which is outside of the ribs and underneat the skin. You should continue your sotolol as prescribed and follow up with your cardiologist and electrophysiologist within 1-2 weeks of discharge from rehab. If you notice any further shocks from your ICD, you should call your cardiologist or return to the ED    Diagnosis: Chronic systolic congestive heart failure  Assessment and Plan of Treatment: You have a history of heart failure secondary to coronary artery disease. You take furosemide and entresto for this condition. You should continue taking these medications as prescribed and follow up with your cardiologist within one week from discharge from rehab. Please try to maintain a low-salt diet to prevent fluid buildup. If you notice any worsening shortness of breath, chest pain, or lower extremity swelling, you should consult your cardiologist or return to the ED.    Diagnosis: Anemia  Assessment and Plan of Treatment: You have a history of low hemoglobin, known a anemia. This is likely secondary to a combination of your cancer, your recent chemotherapy, and your current infection. at [facility], your hemoglobin remained stable. Please monitor for signs of bleed. If you are feeling more fatigued, dizzy/light-headed, feel your heart racing, or feel weak, consult your pcp as these are signs of worsening anemial.    Diagnosis: Pancreatic cancer  Assessment and Plan of Treatment: You have a history of pancreatic cancer and have completed a course of chemotherapy. Your oncology team believes you will benefit from a surgery called a Whipple procedure. This is to be performed with dr. Murphy. Please follow up with Dr. Murphy and your oncology team to go over further logistics regarding your procedure. Of note we removed your mediport during this admission given your active infection, so if you need more chemotherapy in the future you will need new access.     PRINCIPAL DISCHARGE DIAGNOSIS  Diagnosis: Acute osteomyelitis  Assessment and Plan of Treatment: You came to the hospital at [place] for a right leg cellulitis and wound on your toe and were found to have an infection within the bone as well as the skin requiring amputation of your toe. The bacteria, known as staph (but not MRSA) was also growing in your blood. You required IV antibiotics in addition to amputation for treatment. You had a PICC line placed for administration of this antibiotic when you leave the hospital, which shoulc be continued as- Cefazolin 2 grams every 8 hours for total of 4 weeks from blood culture clearance ending on 10/15. We also rlemoved your ICD and mediport over concern for possible bacterial infection of these devices. You should continue to take your antibiotics as prescribed and follow up with your PCP and infectious disease within in one week of discharge from rehab. You should also follow up with podiatry within 1-2 weeks of discharge.      SECONDARY DISCHARGE DIAGNOSES  Diagnosis: History of cardiac arrhythmia  Assessment and Plan of Treatment: You have a history of an abnormal heart rhythm, for which you have an implanted defibrilator. We removed this defibrilator and replaced it with a new one, which is outside of the ribs and underneat the skin. You should continue your sotolol as prescribed and follow up with your cardiologist and electrophysiologist within 1-2 weeks of discharge from rehab. If you notice any further shocks from your ICD, you should call your cardiologist or return to the ED    Diagnosis: Chronic systolic congestive heart failure  Assessment and Plan of Treatment: You have a history of heart failure secondary to coronary artery disease. You take furosemide and entresto for this condition. You should continue taking these medications as prescribed and follow up with your cardiologist within one week from discharge from rehab. Please try to maintain a low-salt diet to prevent fluid buildup. If you notice any worsening shortness of breath, chest pain, or lower extremity swelling, you should consult your cardiologist or return to the ED.    Diagnosis: Anemia  Assessment and Plan of Treatment: You have a history of low hemoglobin, known a anemia. This is likely secondary to a combination of your cancer, your recent chemotherapy, and your current infection. at [facility], your hemoglobin remained stable. Please monitor for signs of bleed. If you are feeling more fatigued, dizzy/light-headed, feel your heart racing, or feel weak, consult your pcp as these are signs of worsening anemial.    Diagnosis: Pancreatic cancer  Assessment and Plan of Treatment: You have a history of pancreatic cancer and have completed a course of chemotherapy. Your oncology team believes you will benefit from a surgery called a Whipple procedure. This is to be performed with dr. Murphy. Please follow up with Dr. Murpyh and your oncology team to go over further logistics regarding your procedure. Of note we removed your mediport during this admission given your active infection, so if you need more chemotherapy in the future you will need new access.     PRINCIPAL DISCHARGE DIAGNOSIS  Diagnosis: Acute osteomyelitis  Assessment and Plan of Treatment: You came to the hospital at [place] for a right leg cellulitis and wound on your toe and were found to have an infection within the bone as well as the skin requiring amputation of your toe. The bacteria, known as staph (but not MRSA) was also growing in your blood. You required IV antibiotics in addition to amputation for treatment. You had a PICC line placed for administration of this antibiotic when you leave the hospital, which shoulc be continued as- Cefazolin 2 grams every 8 hours for total of 4 weeks from blood culture clearance ending on 10/14. Then, you will take keflex 500 mg twice a day for 14 more days. We also rlemoved your ICD and mediport over concern for possible bacterial infection of these devices. You should continue to take your antibiotics as prescribed and follow up with your PCP and infectious disease within in one week of discharge from rehab. You should also follow up with podiatry within 1-2 weeks of discharge. Remember to take cefazolin 2 g every 8 hours until 10/14 and then start keflex 500 mg twice a day for an additional 14 days.      SECONDARY DISCHARGE DIAGNOSES  Diagnosis: History of cardiac arrhythmia  Assessment and Plan of Treatment: You have a history of an abnormal heart rhythm, for which you have an implanted defibrilator. We removed this defibrilator and replaced it with a new one, which is outside of the ribs and underneat the skin. You should continue your sotolol as prescribed and follow up with your cardiologist and electrophysiologist within 1-2 weeks of discharge from rehab. If you notice any further shocks from your ICD, you should call your cardiologist or return to the ED    Diagnosis: Chronic systolic congestive heart failure  Assessment and Plan of Treatment: You have a history of heart failure secondary to coronary artery disease. You take furosemide and entresto for this condition. You should continue taking these medications as prescribed and follow up with your cardiologist within one week from discharge from rehab. Please try to maintain a low-salt diet to prevent fluid buildup. If you notice any worsening shortness of breath, chest pain, or lower extremity swelling, you should consult your cardiologist or return to the ED.    Diagnosis: Pancreatic cancer  Assessment and Plan of Treatment: You have a history of pancreatic cancer and have completed a course of chemotherapy. Your oncology your procedure. Of note we removed your mediport during this admission given your active infection, so if you need more chemotherapy in the team believes you will benefit from a surgery called a Whipple procedure. This is to be performed with dr. Murphy. Please follow up with Dr. Murphy and your oncology team to go over further logistics regarding future you will need new access. You may also follow up with Dr. Mercer if you would like this surgery to be done at [facility].    Diagnosis: Anemia  Assessment and Plan of Treatment: You have a history of low hemoglobin, known a anemia. This is likely secondary to a combination of your cancer, your recent chemotherapy, and your current infection. at [facility], your hemoglobin remained stable. Please monitor for signs of bleed. If you are feeling more fatigued, dizzy/light-headed, feel your heart racing, or feel weak, consult your pcp as these are signs of worsening anemial.     PRINCIPAL DISCHARGE DIAGNOSIS  Diagnosis: Acute osteomyelitis  Assessment and Plan of Treatment: You came to the hospital at [place] for a right leg cellulitis and wound on your toe and were found to have an infection within the bone as well as the skin requiring amputation of your toe. The bacteria, known as staph (but not MRSA) was also growing in your blood. You required IV antibiotics in addition to amputation for treatment. You had a PICC line placed for administration of this antibiotic when you leave the hospital, which shoulc be continued as- Cefazolin 2 grams every 8 hours for total of 4 weeks from blood culture clearance ending on 10/14. Then, you will take keflex 500 mg twice a day for 14 more days. We also rlemoved your ICD and mediport over concern for possible bacterial infection of these devices. You should continue to take your antibiotics as prescribed and follow up with your PCP and infectious disease within in one week of discharge from rehab. You should also follow up with podiatry within 1-2 weeks of discharge. Remember to take cefazolin 2 g every 8 hours until 10/14 and then start keflex 500 mg twice a day for an additional 14 days.      SECONDARY DISCHARGE DIAGNOSES  Diagnosis: History of cardiac arrhythmia  Assessment and Plan of Treatment: You have a history of an abnormal heart rhythm, for which you have an implanted defibrilator. We removed this defibrilator and replaced it with a new one, which is outside of the ribs and underneat the skin. You should continue your sotolol as prescribed and follow up with your cardiologist and electrophysiologist within 1-2 weeks of discharge from rehab. If you notice any further shocks from your ICD, you should call your cardiologist or return to the ED    Diagnosis: Chronic systolic congestive heart failure  Assessment and Plan of Treatment: You have a history of heart failure secondary to coronary artery disease. You take furosemide and entresto for this condition. You should continue taking these medications as prescribed and follow up with your cardiologist within one week from discharge from rehab. Please try to maintain a low-salt diet to prevent fluid buildup. If you notice any worsening shortness of breath, chest pain, or lower extremity swelling, you should consult your cardiologist or return to the ED.    Diagnosis: Pancreatic cancer  Assessment and Plan of Treatment: You have a history of pancreatic cancer and have completed a course of chemotherapy. Your oncology your procedure. Of note we removed your mediport during this admission given your active infection, so if you need more chemotherapy in the team believes you will benefit from a surgery called a Whipple procedure. This is to be performed with dr. Murphy. Please follow up with Dr. Murphy and your oncology team to go over further logistics regarding future you will need new access. You may also follow up with Dr. Mercer if you would like this surgery to be done at [facility].    Diagnosis: Anemia  Assessment and Plan of Treatment: You have a history of low hemoglobin, known a anemia. This is likely secondary to a combination of your cancer, your recent chemotherapy, and your current infection. at [facility], your hemoglobin remained stable. Please monitor for signs of bleed. If you are feeling more fatigued, dizzy/light-headed, feel your heart racing, or feel weak, consult your pcp as these are signs of worsening anemial.    Diagnosis: Pulmonary embolism  Assessment and Plan of Treatment: Please start xarelto again on the evening of 10/6 for your pulmonary embolism

## 2021-09-29 NOTE — DISCHARGE NOTE PROVIDER - HOSPITAL COURSE
HPI:  Mr. 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 Given recent TAVR, transfered [facility] for CORBIN and consideration of ICD extraction. At [place], S/p R. hallux amputation 9/20 and RRT 9/23 for ruptured hematoma @ surgical site during PT, wound irrigated and packed, left open to heal by secondary intention. Bcx 9/15 growing MSSA, with clearance on Bcx 9/16. Currently, pt feels well. Notes some numbness/soreness at amputation site but otherwise ROS negative.  (26 Sep 2021 14:04)    Hospital course: CORBIN without evidence of vegitations. Decision made to extract ICD leads prophylactically. Pt also had mediport extracted per ID recs. Pt remained hemodynamically stable and afebrile during his admission and was monitored closely on telemetry. Pt went for Subcutaneous ICD placement on 10/4. Pt followed by ID and podiatry during admission with wound care/dressing changes. Pt maintained on heparin drip for PE during hospitalization given numerous procedures, transitioned back to xarelto on discharge. Pt is stable and clear for discharge to subacute rehab. He should follow up with his PCP, infectious disease, cardiology, electrophysiology, oncology, and surgery after discharge from subacute rehab. HPI:  Mr. 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 Given recent TAVR, transfered [facility] for CORBIN and consideration of ICD extraction. At [place], S/p R. hallux amputation 9/20 and RRT 9/23 for ruptured hematoma @ surgical site during PT, wound irrigated and packed, left open to heal by secondary intention. Bcx 9/15 growing MSSA, with clearance on Bcx 9/16. Currently, pt feels well. Notes some numbness/soreness at amputation site but otherwise ROS negative.  (26 Sep 2021 14:04)    Hospital course: CORBIN without evidence of vegitations. Decision made to extract ICD leads prophylactically. Pt also had mediport extracted per ID recs. Pt remained hemodynamically stable and afebrile during his admission and was monitored closely on telemetry. Pt went for Subcutaneous ICD placement on 10/4. Pt followed by ID and podiatry during admission with wound care/dressing changes. Pt maintained on heparin drip for PE during hospitalization given numerous procedures, transitioned back to xarelto on discharge. Pt is stable and clear for discharge to subacute rehab. He should follow up with his PCP, infectious disease, podiatry, cardiology, electrophysiology, oncology, and surgery after discharge from subacute rehab. HPI:  Mr. 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 Given recent TAVR, transfered [facility] for CORBIN and consideration of ICD extraction. At [place], S/p R. hallux amputation 9/20 and RRT 9/23 for ruptured hematoma @ surgical site during PT, wound irrigated and packed, left open to heal by secondary intention. Bcx 9/15 growing MSSA, with clearance on Bcx 9/16. Currently, pt feels well. Notes some numbness/soreness at amputation site but otherwise ROS negative.  (26 Sep 2021 14:04)    Hospital course: CORBIN without evidence of vegitations. Decision made to extract ICD leads prophylactically. Pt also had mediport extracted per ID recs. Pt remained hemodynamically stable and afebrile during his admission and was monitored closely on telemetry. Pt went for Subcutaneous ICD placement on 10/4. Pt followed by ID and podiatry during admission with wound care/dressing changes. Pt maintained on heparin drip for PE during hospitalization given numerous procedures, transitioned back to xarelto on discharge. He underwent ICD placement on 10/4 without complications. He will continue with cefazolin 2 g q8h until 10/14 to then be followed by keflex for 14 more days starting on 10/15.  Pt is stable and clear for discharge to subacute rehab. He should follow up with his PCP, infectious disease, podiatry, cardiology, electrophysiology, oncology, and surgery after discharge from subacute rehab. HPI:  Mr. 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 Given recent TAVR, transfered [facility] for CORBIN and consideration of ICD extraction. At [place], S/p R. hallux amputation 9/20 and RRT 9/23 for ruptured hematoma @ surgical site during PT, wound irrigated and packed, left open to heal by secondary intention. Bcx 9/15 growing MSSA, with clearance on Bcx 9/16. Currently, pt feels well. Notes some numbness/soreness at amputation site but otherwise ROS negative.  (26 Sep 2021 14:04)    Hospital course: CORBIN without evidence of vegitations. Decision made to extract ICD leads prophylactically. Pt also had mediport extracted per ID recs. Pt remained hemodynamically stable and afebrile during his admission and was monitored closely on telemetry. Pt went for Subcutaneous ICD placement on 10/4. Pt followed by ID and podiatry during admission with wound care/dressing changes. Pt maintained on heparin drip for PE during hospitalization given numerous procedures, transitioned back to xarelto on discharge. He underwent ICD placement on 10/4 without complications. He will continue with cefazolin 2 g q8h until 10/14 to then be followed by keflex for 14 more days starting on 10/15.  Pt is stable and clear for discharge to subacute rehab. He should follow up with his PCP, infectious disease, podiatry, cardiology, electrophysiology, oncology, and surgery after discharge from subacute rehab. Xarelto was held for EP procedures and is to be restarted on 10/6

## 2021-09-29 NOTE — DISCHARGE NOTE PROVIDER - CARE PROVIDERS DIRECT ADDRESSES
,DirectAddress_Unknown,DirectAddress_Unknown,DirectAddress_Unknown,[email or web address] ,DirectAddress_Unknown,DirectAddress_Unknown,DirectAddress_Unknown,[email or web address],DirectAddress_Unknown ,DirectAddress_Unknown,DirectAddress_Unknown,DirectAddress_Unknown,[email or web address],DirectAddress_Unknown,DirectAddress_Unknown ,DirectAddress_Unknown,DirectAddress_Unknown,DirectAddress_Unknown,[email or web address],DirectAddress_Unknown,DirectAddress_Unknown,[email or web address]

## 2021-09-29 NOTE — PROGRESS NOTE ADULT - SUBJECTIVE AND OBJECTIVE BOX
24H hour events: Pt doing well. No events overnight. Resting in bed. Denies chest pain, palpitations, SOB. Tele: sinus rhythm 80-90s.     MEDICATIONS:  furosemide    Tablet 20 milliGRAM(s) Oral daily  heparin   Injectable 8000 Unit(s) IV Push every 6 hours PRN  heparin   Injectable 4000 Unit(s) IV Push every 6 hours PRN  heparin  Infusion. 1000 Unit(s)/Hr IV Continuous <Continuous>  sacubitril 49 mG/valsartan 51 mG 1 Tablet(s) Oral two times a day  sotalol 40 milliGRAM(s) Oral every 12 hours  tamsulosin 0.4 milliGRAM(s) Oral at bedtime  ceFAZolin   IVPB 2000 milliGRAM(s) IV Intermittent every 8 hours  acetaminophen   Tablet .. 650 milliGRAM(s) Oral every 4 hours PRN  ALPRAZolam 0.25 milliGRAM(s) Oral three times a day PRN  DULoxetine 60 milliGRAM(s) Oral daily  gabapentin 300 milliGRAM(s) Oral two times a day  melatonin 6 milliGRAM(s) Oral at bedtime  oxycodone    5 mG/acetaminophen 325 mG 1 Tablet(s) Oral every 4 hours PRN  oxycodone    5 mG/acetaminophen 325 mG 2 Tablet(s) Oral every 4 hours PRN  pancrelipase  (CREON 36,000 Lipase Units) 2 Capsule(s) Oral three times a day with meals  pantoprazole    Tablet 40 milliGRAM(s) Oral before breakfast  dorzolamide 2%/timolol 0.5% Ophthalmic Solution 1 Drop(s) Both EYES two times a day  influenza   Vaccine 0.5 milliLiter(s) IntraMuscular once  ketorolac 0.5% Ophthalmic Solution 1 Drop(s) Both EYES daily  latanoprost 0.005% Ophthalmic Solution 1 Drop(s) Both EYES at bedtime  magnesium sulfate  IVPB 1 Gram(s) IV Intermittent once  multivitamin 1 Tablet(s) Oral daily  pyridoxine 100 milliGRAM(s) Oral daily      REVIEW OF SYSTEMS:  Complete 10point ROS negative.    PHYSICAL EXAM:  T(C): 36.4 (09-29-21 @ 04:50), Max: 36.9 (09-28-21 @ 11:05)  HR: 93 (09-29-21 @ 04:50) (76 - 93)  BP: 101/61 (09-29-21 @ 04:50) (99/60 - 147/81)  RR: 18 (09-29-21 @ 04:50) (15 - 18)  SpO2: 94% (09-29-21 @ 04:50) (93% - 100%)  Wt(kg): --  I&O's Summary    28 Sep 2021 07:01  -  29 Sep 2021 07:00  --------------------------------------------------------  IN: 240 mL / OUT: 1400 mL / NET: -1160 mL        Appearance: Normal	  HEENT:  Atraumatic  Cardiovascular: Normal S1 S2, No JVD, No murmurs  Respiratory: Lungs clear to auscultation	  Psychiatry: A & O x 3, Mood & affect appropriate  Gastrointestinal:  Soft, Non-tender  Neurologic: Non-focal  Extremities: RLE wrapped.   Vascular: Peripheral pulses palpable 2+ bilaterally    LABS:	 	  CBC Full  -  ( 29 Sep 2021 07:03 )  WBC Count : 5.18 K/uL  Hemoglobin : 8.8 g/dL  Hematocrit : 28.1 %  Platelet Count - Automated : 309 K/uL  Mean Cell Volume : 105.2 fl  Mean Cell Hemoglobin : 33.0 pg  Mean Cell Hemoglobin Concentration : 31.3 gm/dL    09-29    136  |  101  |  14  ----------------------------<  92  4.6   |  25  |  0.75  09-28    137  |  101  |  14  ----------------------------<  122<H>  4.2   |  25  |  0.75    Ca    9.2      29 Sep 2021 07:03  Ca    9.0      28 Sep 2021 06:06  Phos  4.5     09-29  Phos  3.9     09-28  Mg     1.9     09-29  Mg     2.0     09-28    TELEMETRY: Sinus Rhythm 80-90s	    PREVIOUS DIAGNOSTIC TESTING:    [ ] Echocardiogram: < from: Transesophageal Echocardiogram w/o TTE (09.27.21 @ 09:25) >  Observations:  Mitral Valve: Severe mitral annular and leaflet  calcification.  Moderate mitral stenosis. Mean gradient 5.8 mmHg at  68/min.  Mild-moderate mitral regurgitation.  Aortic Valve/Aorta: s/p transcatheter aortic valve  replacement. Leaflet motion is normal.  No aortic valvular or paravalvular regurgitation seen.  Normal size aortic root, arch, and descending thoracic  aorta.  Mild atherosclerosis. No mobile plaque.  Left Atrium: Severe left atrial enlargement.  Left Ventricle: Moderate left ventricular enlargement.  Estimated ejection fraction 30-35%.  Right Heart: Moderate right atrial enlargement. Prominent  Chiari network. Normal right ventricular size and function.    A device wire is noted in the right heart.  Normal tricuspid valve. Mild tricuspid regurgitation.  Normal pulmonic valve. Minimal pulmonic regurgitation.  Pericardium/Pleura: Normal pericardium with no pericardial  effusion.  Hemodynamic: Mild pulmonary hypertension.  Color Doppler doemonstratesthe presence of a patent  foramen ovale.  ------------------------------------------------------------------------  Conclusions:  Moderate left ventricular enlargement. Estimated ejection  fraction 30-35%.  Moderate mitral stenosis due to annular andleaflet  calcification. Mild-moderate mitral regurgitation.  s/p transcatheter aortic valve replacement. Leaflet motion  is normal. No aortic valvular or paravalvular regurgitation  seen.  A device wire is noted in the right heart.  No vegetations seen.    < end of copied text >

## 2021-09-29 NOTE — PROGRESS NOTE ADULT - ASSESSMENT
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    Cellulitis of right lower leg and Acute OM of right hallux  - s/p right 1st partial ray resection.  Tolerated procedure well with no obvious cardiac complications  - MSSA bacteremia on admission BCx; repeat BCx from 9/16 are NGTD  - s/p CORBIN in the setting of TAVR and ICD. no vegetations, ef 30-35  - s/p icd extraction yesterday and tolerated procedure well  - decision on timing of replacement of the explanted device tbd (subq icd versus dc with vest)  - Abx per ID.  Follow recs    Pulmonary embolism.   - Bilateral PE in 7/2020, on Xarelto   - on ac given extraction  - restart xarelto when no contraindication    Chronic systolic HF S/P ICD, MS    - moderate lv dysfxn and moderate ms based on annular calcification  - Continue Entresto   - cont lasix for now   - No signs of acute volume overload. Tolerating Room air  - Strict I/Os, daily weights  - Monitor and replete Lytes. Keep K > 4 and Mg > 2  - on sotalol for vt in the past    HTN  - continue sotalol with hold parameters  - continue entresto with hold parameters  - if bp remains soft hold lasix temporarily     - Monitor and replete lytes, keep K>4, Mg>2.  - All other medical needs as per primary team.  - Other cardiovascular workup will depend on clinical course.  - Will continue to follow.

## 2021-09-29 NOTE — PHYSICAL THERAPY INITIAL EVALUATION ADULT - ADDITIONAL COMMENTS
PTA pt was independent with functional mobility and ADLs. Pt lives in an apartment alone with no steps. Pt owns a straight cane, RW and W/C.

## 2021-09-29 NOTE — DISCHARGE NOTE PROVIDER - PROVIDER TOKENS
PROVIDER:[[id number]],FOLLOWUP:[1 week],ESTABLISHEDPATIENT:[T]],PROVIDER:[TOKEN:[[id number]],FOLLOWUP:[1 week],ESTABLISHEDPATIENT:[T]],PROVIDER:[TOKEN:[[id number]],FOLLOWUP:[1 week],ESTABLISHEDPATIENT:[T]],PROVIDER:[TOKEN:[[id number]],FOLLOWUP:[1 week],ESTABLISHEDPATIENT:[T]] PROVIDER:[TOKEN:[[id number]],FOLLOWUP:[1 week],ESTABLISHEDPATIENT:[T]],PROVIDER:[TOKEN:[[id number]],FOLLOWUP:[1 week],ESTABLISHEDPATIENT:[T]],PROVIDER:[TOKEN:[[id number]],FOLLOWUP:[1 week],ESTABLISHEDPATIENT:[T]],PROVIDER:[TOKEN:[[id number]],FOLLOWUP:[1 week],ESTABLISHEDPATIENT:[T]],PROVIDER:[TOKEN:[[id number]],FOLLOWUP:[1 week]] PROVIDER:[TOKEN:[[id number]],FOLLOWUP:[1 week],ESTABLISHEDPATIENT:[T]],PROVIDER:[TOKEN:[[id number]],FOLLOWUP:[1 week],ESTABLISHEDPATIENT:[T]],PROVIDER:[TOKEN:[[id number]],FOLLOWUP:[1 week],ESTABLISHEDPATIENT:[T]],PROVIDER:[TOKEN:[[id number]],FOLLOWUP:[1 week],ESTABLISHEDPATIENT:[T]],PROVIDER:[TOKEN:[[id number]],FOLLOWUP:[1 week]],PROVIDER:[TOKEN:[[id number]],FOLLOWUP:[1 week],ESTABLISHEDPATIENT:[T]] PROVIDER:[TOKEN:[[id number]],FOLLOWUP:[1 week],ESTABLISHEDPATIENT:[T]],PROVIDER:[TOKEN:[[id number]],FOLLOWUP:[1 week],ESTABLISHEDPATIENT:[T]],PROVIDER:[TOKEN:[[id number]],FOLLOWUP:[1 week],ESTABLISHEDPATIENT:[T]],PROVIDER:[TOKEN:[[id number]],FOLLOWUP:[1 week],ESTABLISHEDPATIENT:[T]],PROVIDER:[TOKEN:[[id number]],FOLLOWUP:[1 week]],PROVIDER:[TOKEN:[[id number]],FOLLOWUP:[1 week],ESTABLISHEDPATIENT:[T]],PROVIDER:[TOKEN:[[id number]]]

## 2021-09-29 NOTE — PROGRESS NOTE ADULT - SUBJECTIVE AND OBJECTIVE BOX
Follow Up:  Bacteremia    Interval History: afebrile. planned for Mediport extraction.     REVIEW OF SYSTEMS  [  ] ROS unobtainable because:    [ x ] All other systems negative except as noted below    Constitutional:  [ ] fever [ ] chills  [ ] weight loss  [ ] weakness  Skin:  [ ] rash [ ] phlebitis	  Eyes: [ ] icterus [ ] pain  [ ] discharge	  ENMT: [ ] sore throat  [ ] thrush [ ] ulcers [ ] exudates  Respiratory: [ ] dyspnea [ ] hemoptysis [ ] cough [ ] sputum	  Cardiovascular:  [ ] chest pain [ ] palpitations [ ] edema	  Gastrointestinal:  [ ] nausea [ ] vomiting [ ] diarrhea [ ] constipation [ ] pain	  Genitourinary:  [ ] dysuria [ ] frequency [ ] hematuria [ ] discharge [ ] flank pain  [ ] incontinence  Musculoskeletal:  [ ] myalgias [ ] arthralgias [ ] arthritis  [ ] back pain  Neurological:  [ ] headache [ ] seizures  [ ] confusion/altered mental status    Allergies  No Known Allergies        ANTIMICROBIALS:  ceFAZolin   IVPB 2000 every 8 hours      OTHER MEDS:  MEDICATIONS  (STANDING):  acetaminophen   Tablet .. 650 every 4 hours PRN  ALPRAZolam 0.25 three times a day PRN  DULoxetine 60 daily  furosemide    Tablet 20 daily  gabapentin 300 two times a day  heparin   Injectable 8000 every 6 hours PRN  heparin   Injectable 4000 every 6 hours PRN  heparin  Infusion. 1000 <Continuous>  influenza   Vaccine 0.5 once  melatonin 6 at bedtime  oxycodone    5 mG/acetaminophen 325 mG 1 every 4 hours PRN  oxycodone    5 mG/acetaminophen 325 mG 2 every 4 hours PRN  pancrelipase  (CREON 36,000 Lipase Units) 2 three times a day with meals  pantoprazole    Tablet 40 before breakfast  sacubitril 49 mG/valsartan 51 mG 1 two times a day  sotalol 40 every 12 hours  tamsulosin 0.4 at bedtime      Vital Signs Last 24 Hrs  T(C): 36.4 (29 Sep 2021 10:47), Max: 36.6 (28 Sep 2021 21:30)  T(F): 97.5 (29 Sep 2021 10:47), Max: 97.9 (28 Sep 2021 21:30)  HR: 85 (29 Sep 2021 18:08) (76 - 93)  BP: 104/69 (29 Sep 2021 18:08) (99/60 - 147/81)  BP(mean): 97 (28 Sep 2021 21:00) (85 - 104)  RR: 18 (29 Sep 2021 18:08) (15 - 18)  SpO2: 98% (29 Sep 2021 18:08) (93% - 100%)    PHYSICAL EXAMINATION:  General: Alert and Awake, NAD  Cardiac: RRR, No M/R/G  Resp: CTAB, No Wh/Rh/Ra  Abdomen: NBS, NT/ND, No HSM, No rigidity or guarding  MSK: +R foot with small area of dehiscence but without erythema, drainage or malodour. Rest of foot well healed and without wounds,  : No hernandez  Skin: No rashes or lesions. Skin is warm and dry to the touch.   Neuro: Alert and Awake. CN 2-12 Grossly intact. Moves all four extremities spontaneously.  Psych: Calm, Pleasant, Cooperative  Vasc: R Chest mediport                          9.3    6.44  )-----------( 317      ( 29 Sep 2021 13:39 )             30.8       09-29    136  |  101  |  14  ----------------------------<  92  4.6   |  25  |  0.75    Ca    9.2      29 Sep 2021 07:03  Phos  4.5     09-29  Mg     1.9     09-29            MICROBIOLOGY:  v  .Other icd lead tip  09-29-21 --  --  --      .Other icd lead tip  09-29-21   Testing in progress  --  --      .Tissue Right hallux bone culutre  09-21-21   Few Pseudomonas aeruginosa  Rare Staphylococcus pettenkoferi  --  Pseudomonas aeruginosa  Staphylococcus pettenkoferi      .Tissue right first metatarsal bone cu  09-21-21   Rare Pseudomonas aeruginosa  Rare Staphylococcus pettenkoferi  --  Pseudomonas aeruginosa  Staphylococcus pettenkoferi      .Blood Blood-Peripheral  09-16-21   No Growth Final  --  --      .Tissue right hallux bone culture  09-16-21   Few Staphylococcus aureus  --  Staphylococcus aureus      Clean Catch Clean Catch (Midstream)  09-15-21   <10,000 CFU/mL Normal Urogenital Juani  --  --      .Blood Blood-Peripheral  09-15-21   Growth in aerobic bottle: Staphylococcus aureus  See previous culture [id number]  --  Blood Culture PCR  Staphylococcus aureus      RADIOLOGY:    <The imaging below has been reviewed and visualized by me independently. Findings as detailed in report below>    < from: Xray Chest 2 Views PA/Lat (09.29.21 @ 12:37) >  IMPRESSION:  Clear lungs.    < end of copied text >   none

## 2021-09-29 NOTE — PROGRESS NOTE ADULT - ATTENDING COMMENTS
above plans discussed with Dr. Saenz    # MSSA bacteremia  # RLE cellulitis/OM of hallux  # concern for ICD infection  # chronic HFrEF  # PE/DVT on xarelto    - CORBIN with no clear vegetation but given high risk s/p ICD extraction  - plan for mediport removal 9/30  - continue IV ancef via PICC line (will not need total 4 week course); ID consulted - appreciate ID recs on safe time for new ICD placement  - appreciate EP recs: life vest vs. subcutaneous ICD  - IR consulted for removal of mediport; no plan for chemoRx for now  - no signs of volume overload at this time; continue home dose lasix 20mg daily  - continue IV heparin while waiting for procedure  - PT consult: previously recommended for TRINA Feng MD  Division of Hospital Medicine  Cell: [phone]  Office: [phone]

## 2021-09-29 NOTE — PROGRESS NOTE ADULT - PROBLEM SELECTOR PLAN 7
- Diagnosed in March 2021, on chemotherapy (does not recall name of medication)  - Continue home Pancrelipase 36,000 2 tabs PO TID with meals  - Per podiatry, Dr. López spoke with Dr. Murphy at [facility] to be postponed until after resolution of acute OM infection  -Pain meds as above

## 2021-09-29 NOTE — PROGRESS NOTE ADULT - SUBJECTIVE AND OBJECTIVE BOX
Podiatry pager #: [phone] ([place])/ [phone] ([facility])    Patient is a 80y old  Male who presents with a chief complaint of osteomylitis, c/f endocarditis (29 Sep 2021 15:55)       INTERVAL HPI/OVERNIGHT EVENTS:  Patient seen and evaluated at bedside.  Pt is resting comfortable in NAD. Denies N/V/F/C.     Allergies    No Known Allergies    Intolerances        Vital Signs Last 24 Hrs  T(C): 36.4 (29 Sep 2021 10:47), Max: 36.6 (28 Sep 2021 21:30)  T(F): 97.5 (29 Sep 2021 10:47), Max: 97.9 (28 Sep 2021 21:30)  HR: 76 (29 Sep 2021 10:47) (76 - 93)  BP: 105/62 (29 Sep 2021 10:47) (99/60 - 147/81)  BP(mean): 97 (28 Sep 2021 21:00) (85 - 104)  RR: 18 (29 Sep 2021 10:47) (15 - 18)  SpO2: 99% (29 Sep 2021 10:47) (93% - 100%)    LABS:                        9.3    6.44  )-----------( 317      ( 29 Sep 2021 13:39 )             30.8     09-29    136  |  101  |  14  ----------------------------<  92  4.6   |  25  |  0.75    Ca    9.2      29 Sep 2021 07:03  Phos  4.5     09-29  Mg     1.9     09-29      PT/INR - ( 29 Sep 2021 07:03 )   PT: 13.0 sec;   INR: 1.09 ratio         PTT - ( 29 Sep 2021 13:39 )  PTT:67.3 sec    CAPILLARY BLOOD GLUCOSE          Lower Extremity Physical Exam:  Vasular: DP/PT 0/4, B/L, CFT <3 seconds B/L, Temperature gradient warm to cool, B/L.   Neuro: Epicritic sensation diminished to the level of digits, B/L.  Musculoskeletal/Ortho: s/p R foot partial ray resection on 9/20  Skin: R foot surgical site proximal sutures intact, distal sutures dehisced, no further hematoma. Surgical flaps are warm and viable, no erythema, no malodor, no signs of acute infection. L foot is unremarkable for any signs of infection     RADIOLOGY & ADDITIONAL TESTS:

## 2021-09-29 NOTE — PROGRESS NOTE ADULT - PROBLEM SELECTOR PLAN 8
- Patient with complaints of coughing/choking on food on 9/18  - S/S consulted at OSH, recs dysphagia 3 soft-thin liquids. Reconsulted at [facility] and recommended pt be restarted on normal diet.  -Aspiration precautions

## 2021-09-29 NOTE — PHYSICAL THERAPY INITIAL EVALUATION ADULT - PERTINENT HX OF CURRENT PROBLEM, REHAB EVAL
Pt is a 80 yoM PMH pancreatic cancer (dx 3/2021, currently undergoing chemo), HTN, HLD, CHF (unknown EF), CAD s/p stents x2 (~15 years ago), defibrillator, TAVR (4/2021), bilateral PE (7/2021) on Xarelto, spinal stenosis, GERD, BPH, macular degeneration initially presented to [facility] c/o R foot pain admitted for R hallux pressure injury and RLE cellulitis found to have MSSA bacteremia and acute OM of R hallux. Given recent TAVR, tx to [facility] for CORBIN and consideration of ICD extraction.

## 2021-09-29 NOTE — PROGRESS NOTE ADULT - ASSESSMENT
80M PMH of pancreatic cancer (3/2021), HTN, HLD, CHF, CAD s/p stents, ICD, TAVR, b/l PE, spinal stenosis s/p dylon placement, GERD, BPH, L. hip replacement x3, presenting with R. hallux OM s/p amputation.     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   s/p R hallux amputation on 9/20  Operative Cultures with CoNS and Pseudomonas but pathology clear  Unclear if these cultures are dirty bone or clean bone  However, in light of negative path and stable exam would not cover for the Pseudomonas for now    Concerning context for MSSA Bacteremia given mediport, AICD and TAVR  CORBIN without vegetations  s/p AICD removal 9/28  Planned for Mediport removal    Plan to treat for minimum of 4 weeks with Cefazolin from negative blood cultures (9/16 -->)    Overall, MSSA Bacteremia, Toe OM, Positive Culture Finding (Pseudomonas), AICD in place, Mediport in place, s/p TAVR    -Continue Cefazolin 2g IV Q8H  -s/p AICD removal 9/28  -Given cleared repeat BCx, negative CORBIN and stable clinical status would have no absolute ID contraindication for replacing AICD within 72H from extraction (Friday)  -Given cleared repeat BCx, negative CORBIN and stable clinical status would have no absolute ID contraindication for proceeding with Whipple procedure (called and discussed case with Surgical Oncologist Dr. Shon Murphy)  -Planned for mediport extraction  -Continue to follow CBC with diff  -Continue to follow temperature curve    I will continue to follow. Please feel free to contact me with any further questions.    Sanjay Andres M.D.  [facility] Division of Infectious Disease  8AM-5PM: Pager Number [phone]  After Hours (or if no response): Please contact the Infectious Diseases Office at [phone]     I spent total 50 minutes on patient encounter speaking to surgical oncologist, discussing case with daughter, changing wounds and patient care

## 2021-09-29 NOTE — PROGRESS NOTE ADULT - SUBJECTIVE AND OBJECTIVE BOX
PROGRESS NOTE:   Authored by Fady Saenz MD, PGY1 [facility] Pager [phone] [facility] pager [phone]    Patient is a 80y old  Male who presents with a chief complaint of osteomylitis, c/f endocarditis (28 Sep 2021 17:45)      SUBJECTIVE / OVERNIGHT EVENTS:     REVIEW OF SYSTEMS:    CONSTITUTIONAL: No weakness, fevers or chills  EYES/ENT: No visual changes;  No vertigo or throat pain   NECK: No pain or stiffness  RESPIRATORY: No cough, wheezing, hemoptysis; No shortness of breath  CARDIOVASCULAR: No chest pain or palpitations  GASTROINTESTINAL: No abdominal or epigastric pain. No nausea, vomiting, or hematemesis; No diarrhea or constipation. No melena or hematochezia.  GENITOURINARY: No dysuria, frequency or hematuria  NEUROLOGICAL: No numbness or weakness  SKIN: No itching, rashes    MEDICATIONS  (STANDING):  ceFAZolin   IVPB 2000 milliGRAM(s) IV Intermittent every 8 hours  dorzolamide 2%/timolol 0.5% Ophthalmic Solution 1 Drop(s) Both EYES two times a day  DULoxetine 60 milliGRAM(s) Oral daily  furosemide    Tablet 20 milliGRAM(s) Oral daily  gabapentin 300 milliGRAM(s) Oral two times a day  heparin  Infusion. 1000 Unit(s)/Hr (10 mL/Hr) IV Continuous <Continuous>  influenza   Vaccine 0.5 milliLiter(s) IntraMuscular once  ketorolac 0.5% Ophthalmic Solution 1 Drop(s) Both EYES daily  latanoprost 0.005% Ophthalmic Solution 1 Drop(s) Both EYES at bedtime  melatonin 6 milliGRAM(s) Oral at bedtime  multivitamin 1 Tablet(s) Oral daily  pancrelipase  (CREON 36,000 Lipase Units) 2 Capsule(s) Oral three times a day with meals  pantoprazole    Tablet 40 milliGRAM(s) Oral before breakfast  pyridoxine 100 milliGRAM(s) Oral daily  sacubitril 49 mG/valsartan 51 mG 1 Tablet(s) Oral two times a day  sotalol 40 milliGRAM(s) Oral every 12 hours  tamsulosin 0.4 milliGRAM(s) Oral at bedtime    MEDICATIONS  (PRN):  acetaminophen   Tablet .. 650 milliGRAM(s) Oral every 4 hours PRN Mild Pain (1 - 3)  ALPRAZolam 0.25 milliGRAM(s) Oral three times a day PRN Anxiety/agitation  heparin   Injectable 8000 Unit(s) IV Push every 6 hours PRN For aPTT less than 40  heparin   Injectable 4000 Unit(s) IV Push every 6 hours PRN For aPTT between 40 - 57  oxycodone    5 mG/acetaminophen 325 mG 1 Tablet(s) Oral every 4 hours PRN Moderate Pain (4 - 6)  oxycodone    5 mG/acetaminophen 325 mG 2 Tablet(s) Oral every 4 hours PRN Severe Pain (7 - 10)      CAPILLARY BLOOD GLUCOSE        I&O's Summary    28 Sep 2021 07:01  -  29 Sep 2021 07:00  --------------------------------------------------------  IN: 240 mL / OUT: 1400 mL / NET: -1160 mL        PHYSICAL EXAM:  Vital Signs Last 24 Hrs  T(C): 36.4 (29 Sep 2021 04:50), Max: 36.9 (28 Sep 2021 11:05)  T(F): 97.6 (29 Sep 2021 04:50), Max: 98.5 (28 Sep 2021 11:05)  HR: 93 (29 Sep 2021 04:50) (76 - 93)  BP: 101/61 (29 Sep 2021 04:50) (94/60 - 147/81)  BP(mean): 97 (28 Sep 2021 21:00) (85 - 104)  RR: 18 (29 Sep 2021 04:50) (15 - 18)  SpO2: 94% (29 Sep 2021 04:50) (93% - 100%)    CONSTITUTIONAL: NAD, well-developed  RESPIRATORY: Normal respiratory effort; lungs are clear to auscultation bilaterally  CARDIOVASCULAR: Regular rate and rhythm, normal S1 and S2, no murmur/rub/gallop; No lower extremity edema; Peripheral pulses are 2+ bilaterally  ABDOMEN: Nontender to palpation, normoactive bowel sounds, no rebound/guarding; No hepatosplenomegaly  MUSCLOSKELETAL: no clubbing or cyanosis of digits; no joint swelling or tenderness to palpation  PSYCH: A+O to person, place, and time; affect appropriate  NEURO: Non-focal, no tremors  SKIN: No rashes    LABS:                        8.8    5.18  )-----------( 309      ( 29 Sep 2021 07:03 )             28.1     09-28    137  |  101  |  14  ----------------------------<  122<H>  4.2   |  25  |  0.75    Ca    9.0      28 Sep 2021 06:06  Phos  3.9     09-28  Mg     2.0     09-28      PT/INR - ( 28 Sep 2021 06:06 )   PT: 13.8 sec;   INR: 1.16 ratio         PTT - ( 28 Sep 2021 10:04 )  PTT:>200.0 sec            RADIOLOGY & ADDITIONAL TESTS:  No new imaging or tests    COORDINATION OF CARE:  Care Discussed with Consultants/Other Providers [Y/N]:  Prior or Outpatient Records Reviewed [Y/N]:   PROGRESS NOTE:   Authored by Fady Saenz MD, PGY1 [facility] Pager [phone] [facility] pager [phone]    Patient is a 80y old  Male who presents with a chief complaint of osteomylitis, c/f endocarditis (28 Sep 2021 17:45)      SUBJECTIVE / OVERNIGHT EVENTS: S/P ICD extraction yesterday. No acute events overnight. Pt feels well today. Denies new chest pain, SOB, palpitations, dizziness or light-headedness. No abdominal pain, n/v/d.     REVIEW OF SYSTEMS:    CONSTITUTIONAL: No weakness, fevers or chills  EYES/ENT: No visual changes;  No vertigo or throat pain   NECK: No pain or stiffness  RESPIRATORY: No cough, wheezing, hemoptysis; No shortness of breath  CARDIOVASCULAR: No chest pain or palpitations  GASTROINTESTINAL: No abdominal or epigastric pain. No nausea, vomiting, or hematemesis; No diarrhea or constipation. No melena or hematochezia.  GENITOURINARY: No dysuria, frequency or hematuria  NEUROLOGICAL: No numbness or weakness  SKIN: No itching, rashes. R. foot pain.     MEDICATIONS  (STANDING):  ceFAZolin   IVPB 2000 milliGRAM(s) IV Intermittent every 8 hours  dorzolamide 2%/timolol 0.5% Ophthalmic Solution 1 Drop(s) Both EYES two times a day  DULoxetine 60 milliGRAM(s) Oral daily  furosemide    Tablet 20 milliGRAM(s) Oral daily  gabapentin 300 milliGRAM(s) Oral two times a day  heparin  Infusion. 1000 Unit(s)/Hr (10 mL/Hr) IV Continuous <Continuous>  influenza   Vaccine 0.5 milliLiter(s) IntraMuscular once  ketorolac 0.5% Ophthalmic Solution 1 Drop(s) Both EYES daily  latanoprost 0.005% Ophthalmic Solution 1 Drop(s) Both EYES at bedtime  melatonin 6 milliGRAM(s) Oral at bedtime  multivitamin 1 Tablet(s) Oral daily  pancrelipase  (CREON 36,000 Lipase Units) 2 Capsule(s) Oral three times a day with meals  pantoprazole    Tablet 40 milliGRAM(s) Oral before breakfast  pyridoxine 100 milliGRAM(s) Oral daily  sacubitril 49 mG/valsartan 51 mG 1 Tablet(s) Oral two times a day  sotalol 40 milliGRAM(s) Oral every 12 hours  tamsulosin 0.4 milliGRAM(s) Oral at bedtime    MEDICATIONS  (PRN):  acetaminophen   Tablet .. 650 milliGRAM(s) Oral every 4 hours PRN Mild Pain (1 - 3)  ALPRAZolam 0.25 milliGRAM(s) Oral three times a day PRN Anxiety/agitation  heparin   Injectable 8000 Unit(s) IV Push every 6 hours PRN For aPTT less than 40  heparin   Injectable 4000 Unit(s) IV Push every 6 hours PRN For aPTT between 40 - 57  oxycodone    5 mG/acetaminophen 325 mG 1 Tablet(s) Oral every 4 hours PRN Moderate Pain (4 - 6)  oxycodone    5 mG/acetaminophen 325 mG 2 Tablet(s) Oral every 4 hours PRN Severe Pain (7 - 10)      CAPILLARY BLOOD GLUCOSE        I&O's Summary    28 Sep 2021 07:01  -  29 Sep 2021 07:00  --------------------------------------------------------  IN: 240 mL / OUT: 1400 mL / NET: -1160 mL        PHYSICAL EXAM:  Vital Signs Last 24 Hrs  T(C): 36.4 (29 Sep 2021 04:50), Max: 36.9 (28 Sep 2021 11:05)  T(F): 97.6 (29 Sep 2021 04:50), Max: 98.5 (28 Sep 2021 11:05)  HR: 93 (29 Sep 2021 04:50) (76 - 93)  BP: 101/61 (29 Sep 2021 04:50) (94/60 - 147/81)  BP(mean): 97 (28 Sep 2021 21:00) (85 - 104)  RR: 18 (29 Sep 2021 04:50) (15 - 18)  SpO2: 94% (29 Sep 2021 04:50) (93% - 100%)    CONSTITUTIONAL: NAD, well-developed  RESPIRATORY: Normal respiratory effort; lungs are clear to auscultation bilaterally  CARDIOVASCULAR: Regular rate and rhythm, normal S1 and S2, no murmur/rub/gallop; No lower extremity edema; Peripheral pulses are 2+ bilaterally  ABDOMEN: Nontender to palpation, normoactive bowel sounds, no rebound/guarding; No hepatosplenomegaly  MUSCLOSKELETAL: no clubbing or cyanosis of digits; RLE dressing appears clean and dry. No LE swelling, redness, or TTP. Good sensation lower extremity.   PSYCH: A+O to person, place, and time; affect appropriate  NEURO: Non-focal, no tremors  SKIN: No rashes. Groin site s/p ICD extraction without tenderness, ecchymoses, palpable thrills, bandage clean and dry.   LABS:                        8.8    5.18  )-----------( 309      ( 29 Sep 2021 07:03 )             28.1     09-28    137  |  101  |  14  ----------------------------<  122<H>  4.2   |  25  |  0.75    Ca    9.0      28 Sep 2021 06:06  Phos  3.9     09-28  Mg     2.0     09-28      PT/INR - ( 28 Sep 2021 06:06 )   PT: 13.8 sec;   INR: 1.16 ratio         PTT - ( 28 Sep 2021 10:04 )  PTT:>200.0 sec            RADIOLOGY & ADDITIONAL TESTS:  No new imaging or tests    COORDINATION OF CARE:  Care Discussed with Consultants/Other Providers [Y/N]:  Prior or Outpatient Records Reviewed [Y/N]:

## 2021-09-29 NOTE — PROGRESS NOTE ADULT - SUBJECTIVE AND OBJECTIVE BOX
[facility] - Milo Thomas, Adolfo, Binta, Brittanie, Javier Don  Office Number:  [phone]    Patient resting comfortably in bed in NAD.   s/p device extraction yesterday  no chest pain or dyspnea    ROS: negative unless otherwise mentioned.    Telemetry:  sr    MEDICATIONS  (STANDING):  ceFAZolin   IVPB 2000 milliGRAM(s) IV Intermittent every 8 hours  dorzolamide 2%/timolol 0.5% Ophthalmic Solution 1 Drop(s) Both EYES two times a day  DULoxetine 60 milliGRAM(s) Oral daily  furosemide    Tablet 20 milliGRAM(s) Oral daily  gabapentin 300 milliGRAM(s) Oral two times a day  heparin  Infusion. 1000 Unit(s)/Hr (10 mL/Hr) IV Continuous <Continuous>  influenza   Vaccine 0.5 milliLiter(s) IntraMuscular once  ketorolac 0.5% Ophthalmic Solution 1 Drop(s) Both EYES daily  latanoprost 0.005% Ophthalmic Solution 1 Drop(s) Both EYES at bedtime  magnesium sulfate  IVPB 1 Gram(s) IV Intermittent once  melatonin 6 milliGRAM(s) Oral at bedtime  multivitamin 1 Tablet(s) Oral daily  pancrelipase  (CREON 36,000 Lipase Units) 2 Capsule(s) Oral three times a day with meals  pantoprazole    Tablet 40 milliGRAM(s) Oral before breakfast  pyridoxine 100 milliGRAM(s) Oral daily  sacubitril 49 mG/valsartan 51 mG 1 Tablet(s) Oral two times a day  sotalol 40 milliGRAM(s) Oral every 12 hours  tamsulosin 0.4 milliGRAM(s) Oral at bedtime    MEDICATIONS  (PRN):  acetaminophen   Tablet .. 650 milliGRAM(s) Oral every 4 hours PRN Mild Pain (1 - 3)  ALPRAZolam 0.25 milliGRAM(s) Oral three times a day PRN Anxiety/agitation  heparin   Injectable 4000 Unit(s) IV Push every 6 hours PRN For aPTT between 40 - 57  heparin   Injectable 8000 Unit(s) IV Push every 6 hours PRN For aPTT less than 40  oxycodone    5 mG/acetaminophen 325 mG 1 Tablet(s) Oral every 4 hours PRN Moderate Pain (4 - 6)  oxycodone    5 mG/acetaminophen 325 mG 2 Tablet(s) Oral every 4 hours PRN Severe Pain (7 - 10)      Allergies    No Known Allergies    Intolerances        Vital Signs Last 24 Hrs  T(C): 36.4 (29 Sep 2021 04:50), Max: 36.9 (28 Sep 2021 11:05)  T(F): 97.6 (29 Sep 2021 04:50), Max: 98.5 (28 Sep 2021 11:05)  HR: 93 (29 Sep 2021 04:50) (76 - 93)  BP: 101/61 (29 Sep 2021 04:50) (99/60 - 147/81)  BP(mean): 97 (28 Sep 2021 21:00) (85 - 104)  RR: 18 (29 Sep 2021 04:50) (15 - 18)  SpO2: 94% (29 Sep 2021 04:50) (93% - 100%)    I&O's Summary    28 Sep 2021 07:01  -  29 Sep 2021 07:00  --------------------------------------------------------  IN: 240 mL / OUT: 1400 mL / NET: -1160 mL        ON EXAM:    Constitutional: well-nourished, well-developed, NAD   HEENT:  MMM, sclerae anicteric, conjunctivae clear, no oral cyanosis.  Pulmonary: Non-labored, breath sounds are clear bilaterally, No wheezing, rales or rhonchi  Cardiovascular: Regular, S1 and S2.  No murmur.  No rubs, gallops or clicks  Gastrointestinal: Bowel Sounds present, soft, nontender.   Lymph: No peripheral edema. right foot bandage cdi  Neurological: Alert, no focal deficits  Skin: No rashes.  Psych:  Mood & affect appropriate      LABS: All Labs Reviewed:                        8.8    5.18  )-----------( 309      ( 29 Sep 2021 07:03 )             28.1                         8.4    4.66  )-----------( 313      ( 28 Sep 2021 06:06 )             27.2                         9.1    4.15  )-----------( 338      ( 27 Sep 2021 18:51 )             28.8     29 Sep 2021 07:03    136    |  101    |  14     ----------------------------<  92     4.6     |  25     |  0.75   28 Sep 2021 06:06    137    |  101    |  14     ----------------------------<  122    4.2     |  25     |  0.75   27 Sep 2021 07:24    136    |  99     |  15     ----------------------------<  91     4.3     |  25     |  0.81     Ca    9.2        29 Sep 2021 07:03  Ca    9.0        28 Sep 2021 06:06  Ca    9.3        27 Sep 2021 07:24  Phos  4.5       29 Sep 2021 07:03  Phos  3.9       28 Sep 2021 06:06  Phos  4.1       27 Sep 2021 07:24  Mg     1.9       29 Sep 2021 07:03  Mg     2.0       28 Sep 2021 06:06  Mg     1.8       27 Sep 2021 07:24      PT/INR - ( 29 Sep 2021 07:03 )   PT: 13.0 sec;   INR: 1.09 ratio         PTT - ( 29 Sep 2021 07:03 )  PTT:31.5 sec      Blood Culture: Organism --  Gram Stain Blood -- Gram Stain --  Specimen Source .Other icd lead tip  Culture-Blood --

## 2021-09-29 NOTE — DISCHARGE NOTE PROVIDER - NSDCCPTREATMENT_GEN_ALL_CORE_FT
PRINCIPAL PROCEDURE  Procedure: Removal, ICD  Findings and Treatment: An approximate 3 inch elliptical incision was made around the prior  ICD incision. Using a combination of electrocautery and  sharp/blunt dissection the ICD pocket was entered. The ICD removed  from the pocket and disconnected from the leads. The  active fixation mechanism was retracted, the leads were cut and the  anchors removed. Locking stylets were placed distally and  a #5 silk tied to the outside of the lead. A One -tie was placed on  the outside of the lead. A 16Fr laser sheath was employed to  free the leads from encapsulating scar tissue in the vasculature and  heart. There was calcific scarring at the venous entry and  on the proximal SVC coil. The lead was removed with countertraction.  The venous entry site was oversewn with a 2-0 vicryl  suture. The pocket was irrigated with an antibiotic solution and  hemostasis was achieved with electrocautery. The wound was  closed in layers with 2-0 and 3-0 vicryl/4-0 monocryl sutures and  steristrips were applied.  The guidewire and sheath were removed and hemostasis achieved with  pressure. The patient tolerated the procedure well  without complication and was returned to the recovery room in good  condition..  Needle and sponge counts were correct and the patient tolerated the  procedure well.        SECONDARY PROCEDURE  Procedure: US echo transesophageal  Findings and Treatment: Conclusions:  Moderate left ventricular enlargement. Estimated ejection  fraction 30-35%.  Moderate mitral stenosis due to annular and leaflet  calcification. Mild-moderate mitral regurgitation.  s/p transcatheter aortic valve replacement. Leaflet motion  is normal. No aortic valvular or paravalvular regurgitation  seen.  A device wire is noted in the right heart.  No vegetations seen.

## 2021-09-29 NOTE — DISCHARGE NOTE PROVIDER - NSDCMRMEDTOKEN_GEN_ALL_CORE_FT
acetaminophen 325 mg oral tablet: 2 tab(s) orally every 6 hours, As needed, For Temp over 37.9 C (100.2 F)  ALPRAZolam 0.25 mg oral tablet: 1 tab(s) orally 3 times a day, As Needed  ceFAZolin: 2000 milligram(s) intravenous every 8 hours  cholecalciferol 400 intl units (10 mcg) oral tablet: 1 tab(s) orally once a day  Coenzyme Q10 200 mg oral capsule: 1 tab(s) orally once a day  dorzolamide-timolol 2.23%-0.68% ophthalmic solution: 1 drop(s) to each affected eye 2 times a day  DULoxetine 60 mg oral delayed release capsule: 1 cap(s) orally once a day  Entresto 49 mg-51 mg oral tablet: 1 tab(s) orally 2 times a day  furosemide 20 mg oral tablet: 1 tab(s) orally once a day  gabapentin 300 mg oral tablet: 1 tab(s) orally 2 times a day  heparin 100 units/mL-D5% intravenous solution:  intravenous   ketorolac 0.5% ophthalmic solution: 1 drop(s) to each affected eye once a day  latanoprost 0.005% ophthalmic solution: 1 drop(s) to each affected eye once a day (in the evening)  lidocaine-prilocaine 2.5%-2.5% topical cream: Apply topically to affected area once on day of treament for 1 dose  Multiple Vitamins oral tablet: 1 tab(s) orally once a day  omeprazole 20 mg oral delayed release tablet: 1 tab(s) orally once a day  oxycodone-acetaminophen 5 mg-325 mg oral tablet: 1 tab(s) orally every 6 hours, As needed, Moderate Pain (4 - 6)  pancrelipase 36,000 units-114,000 units-180,000 units oral delayed release capsule: 2 cap(s) orally 3 times a day  prochlorperazine 10 mg oral capsule, extended release:   sotalol 80 mg oral tablet: 0.5 tab(s) orally 2 times a day  tamsulosin 0.4 mg oral capsule: 1 cap(s) orally once a day  Vitamin B6 100 mg oral tablet: 1 tab(s) orally once a day   acetaminophen 325 mg oral tablet: 2 tab(s) orally every 6 hours, As needed, For Temp over 37.9 C (100.2 F)  ALPRAZolam 0.25 mg oral tablet: 1 tab(s) orally 3 times a day, As Needed  ceFAZolin: 2000 milligram(s) intravenous every 8 hours  cholecalciferol 400 intl units (10 mcg) oral tablet: 1 tab(s) orally once a day  Coenzyme Q10 200 mg oral capsule: 1 tab(s) orally once a day  dorzolamide-timolol 2.23%-0.68% ophthalmic solution: 1 drop(s) to each affected eye 2 times a day  DULoxetine 60 mg oral delayed release capsule: 1 cap(s) orally once a day  furosemide 20 mg oral tablet: 1 tab(s) orally once a day  gabapentin 300 mg oral tablet: 1 tab(s) orally 2 times a day  ketorolac 0.5% ophthalmic solution: 1 drop(s) to each affected eye once a day  latanoprost 0.005% ophthalmic solution: 1 drop(s) to each affected eye once a day (in the evening)  lidocaine-prilocaine 2.5%-2.5% topical cream: Apply topically to affected area once on day of treament for 1 dose  melatonin 3 mg oral tablet: 2 tab(s) orally once a day (at bedtime)  Multiple Vitamins oral tablet: 1 tab(s) orally once a day  omeprazole 20 mg oral delayed release tablet: 1 tab(s) orally once a day  oxycodone-acetaminophen 5 mg-325 mg oral tablet: 1 tab(s) orally every 6 hours, As needed, Moderate Pain (4 - 6)  pancrelipase 36,000 units-114,000 units-180,000 units oral delayed release capsule: 2 cap(s) orally 3 times a day  prochlorperazine 10 mg oral capsule, extended release:   sacubitril-valsartan 49 mg-51 mg oral tablet: 1 tab(s) orally 2 times a day  sotalol 80 mg oral tablet: 0.5 tab(s) orally 2 times a day  tamsulosin 0.4 mg oral capsule: 1 cap(s) orally once a day  Vitamin B6 100 mg oral tablet: 1 tab(s) orally once a day   acetaminophen 325 mg oral tablet: 2 tab(s) orally every 6 hours, As needed, For Temp over 37.9 C (100.2 F)  ALPRAZolam 0.25 mg oral tablet: 1 tab(s) orally 3 times a day, As Needed  ceFAZolin: 2000 milligram(s) intravenous every 8 hours, until 10/15  cholecalciferol 400 intl units (10 mcg) oral tablet: 1 tab(s) orally once a day  Coenzyme Q10 200 mg oral capsule: 1 tab(s) orally once a day  dorzolamide-timolol 2.23%-0.68% ophthalmic solution: 1 drop(s) to each affected eye 2 times a day  DULoxetine 60 mg oral delayed release capsule: 1 cap(s) orally once a day  furosemide 20 mg oral tablet: 1 tab(s) orally once a day  gabapentin 300 mg oral tablet: 1 tab(s) orally 2 times a day  ketorolac 0.5% ophthalmic solution: 1 drop(s) to each affected eye once a day  latanoprost 0.005% ophthalmic solution: 1 drop(s) to each affected eye once a day (in the evening)  lidocaine-prilocaine 2.5%-2.5% topical cream: Apply topically to affected area once on day of treament for 1 dose  melatonin 3 mg oral tablet: 2 tab(s) orally once a day (at bedtime)  Multiple Vitamins oral tablet: 1 tab(s) orally once a day  omeprazole 20 mg oral delayed release tablet: 1 tab(s) orally once a day  oxycodone-acetaminophen 5 mg-325 mg oral tablet: 1 tab(s) orally every 6 hours, As needed, Moderate Pain (4 - 6)  pancrelipase 36,000 units-114,000 units-180,000 units oral delayed release capsule: 2 cap(s) orally 3 times a day  prochlorperazine 10 mg oral capsule, extended release:   sacubitril-valsartan 49 mg-51 mg oral tablet: 1 tab(s) orally 2 times a day  sotalol 80 mg oral tablet: 0.5 tab(s) orally 2 times a day  tamsulosin 0.4 mg oral capsule: 1 cap(s) orally once a day  Vitamin B6 100 mg oral tablet: 1 tab(s) orally once a day   acetaminophen 325 mg oral tablet: 2 tab(s) orally every 6 hours, As needed, For Temp over 37.9 C (100.2 F)  ALPRAZolam 0.25 mg oral tablet: 1 tab(s) orally 3 times a day, As Needed  ceFAZolin: 2000 milligram(s) intravenous every 8 hours, until 10/15  cholecalciferol 400 intl units (10 mcg) oral tablet: 1 tab(s) orally once a day  Coenzyme Q10 200 mg oral capsule: 1 tab(s) orally once a day  dorzolamide-timolol 2.23%-0.68% ophthalmic solution: 1 drop(s) to each affected eye 2 times a day  DULoxetine 60 mg oral delayed release capsule: 1 cap(s) orally once a day  furosemide 20 mg oral tablet: 1 tab(s) orally once a day  gabapentin 300 mg oral tablet: 1 tab(s) orally 2 times a day  ketorolac 0.5% ophthalmic solution: 1 drop(s) to each affected eye once a day  latanoprost 0.005% ophthalmic solution: 1 drop(s) to each affected eye once a day (in the evening)  lidocaine-prilocaine 2.5%-2.5% topical cream: Apply topically to affected area once on day of treament for 1 dose  melatonin 3 mg oral tablet: 2 tab(s) orally once a day (at bedtime)  Multiple Vitamins oral tablet: 1 tab(s) orally once a day  omeprazole 20 mg oral delayed release tablet: 1 tab(s) orally once a day  oxycodone-acetaminophen 5 mg-325 mg oral tablet: 1 tab(s) orally every 6 hours, As needed, Moderate Pain (4 - 6)  pancrelipase 36,000 units-114,000 units-180,000 units oral delayed release capsule: 2 cap(s) orally 3 times a day  prochlorperazine 10 mg oral capsule, extended release:   sacubitril-valsartan 24 mg-26 mg oral tablet: 1 tab(s) orally 2 times a day  sotalol 80 mg oral tablet: 0.5 tab(s) orally 2 times a day  tamsulosin 0.4 mg oral capsule: 1 cap(s) orally once a day  Vitamin B6 100 mg oral tablet: 1 tab(s) orally once a day   acetaminophen 325 mg oral tablet: 2 tab(s) orally every 6 hours, As needed, For Temp over 37.9 C (100.2 F)  ALPRAZolam 0.25 mg oral tablet: 1 tab(s) orally 3 times a day, As Needed  ceFAZolin: 2000 milligram(s) intravenous every 8 hours, until 10/15  cholecalciferol 400 intl units (10 mcg) oral tablet: 1 tab(s) orally once a day  Coenzyme Q10 200 mg oral capsule: 1 tab(s) orally once a day  dorzolamide-timolol 2.23%-0.68% ophthalmic solution: 1 drop(s) to each affected eye 2 times a day  DULoxetine 60 mg oral delayed release capsule: 1 cap(s) orally once a day  gabapentin 300 mg oral tablet: 1 tab(s) orally 2 times a day  ketorolac 0.5% ophthalmic solution: 1 drop(s) to each affected eye once a day  latanoprost 0.005% ophthalmic solution: 1 drop(s) to each affected eye once a day (in the evening)  lidocaine-prilocaine 2.5%-2.5% topical cream: Apply topically to affected area once on day of treament for 1 dose  melatonin 3 mg oral tablet: 2 tab(s) orally once a day (at bedtime)  Multiple Vitamins oral tablet: 1 tab(s) orally once a day  oxycodone-acetaminophen 5 mg-325 mg oral tablet: 1 tab(s) orally every 6 hours, As needed, Moderate Pain (4 - 6)  pancrelipase 36,000 units-114,000 units-180,000 units oral delayed release capsule: 2 cap(s) orally 3 times a day  sacubitril-valsartan 24 mg-26 mg oral tablet: 1 tab(s) orally 2 times a day  sotalol 80 mg oral tablet: 0.5 tab(s) orally 2 times a day  tamsulosin 0.4 mg oral capsule: 1 cap(s) orally once a day  Vitamin B6 100 mg oral tablet: 1 tab(s) orally once a day

## 2021-09-29 NOTE — DISCHARGE NOTE PROVIDER - CARE PROVIDER_API CALL
Shon Murphy (MD)  Surgery  [address] [facility], [address]  [place] [address]  Phone: [phone]  Fax: [phone]  Established Patient  Follow Up Time: 1 week    ASHLEE ARANDA  Internal Medicine  [address] [place] [address]  Phone: [phone]  Fax: ()-  Established Patient  Follow Up Time: 1 week    ROSEMARY MARAVILLA  Internal Medicine  [address] [place] [address]  Phone: [phone]  Fax: [phone]  Established Patient  Follow Up Time: 1 week    Jaren Bell)  Infectious Disease; Internal Medicine  [address] [place] [address]  Phone: [phone]  Fax: [phone]  Established Patient  Follow Up Time: 1 week   Shon Murphy (MD)  Surgery  [address] [facility], [address]  [place] [address]  Phone: [phone]  Fax: [phone]  Established Patient  Follow Up Time: 1 week    ASHLEE ARANDA  Internal Medicine  [address] [place] [address]  Phone: ()-  Fax: ()-  Established Patient  Follow Up Time: 1 week    ROSEMARY MARAVILLA  Internal Medicine  [address] [place] [address]  Phone: [phone]  Fax: [phone]  Established Patient  Follow Up Time: 1 week    Jaren Bell)  Infectious Disease; Internal Medicine  [address] [place] [address]  Phone: [phone]  Fax: [phone]  Established Patient  Follow Up Time: 1 week    Brian López (DPM)  [facility]  [address] [place] [address]  Phone: [phone]  Fax: [phone]  Follow Up Time: 1 week   Shon Murphy (MD)  Surgery  [address] [facility], [address]  [place] [address]  Phone: [phone]  Fax: [phone]  Established Patient  Follow Up Time: 1 week    ASHLEE ARANDA  Internal Medicine  [address] [place] [address]  Phone: ()-  Fax: ()-  Established Patient  Follow Up Time: 1 week    ROSEMARY MARAVILLA  Internal Medicine  [address] [place] [address]  Phone: [phone]  Fax: [phone]  Established Patient  Follow Up Time: 1 week    Jaren Bell)  Infectious Disease; Internal Medicine  [address] [place] [address]  Phone: [phone]  Fax: [phone]  Established Patient  Follow Up Time: 1 week    Brian López (DPM)  [place] [facility]  [address] [place] [address]  Phone: [phone]  Fax: [phone]  Follow Up Time: 1 week    SHANTI MOORE  Hematology/Oncology  [address] [place] [address]  Phone: [phone]  Fax: [phone]  Established Patient  Follow Up Time: 1 week   Shon Murphy)  Surgery  [address] [facility], [address]  [place] [address]  Phone: [phone]  Fax: [phone]  Established Patient  Follow Up Time: 1 week    ASHLEE ARANDA  Internal Medicine  [address] [place] [address]  Phone: ()-  Fax: ()-  Established Patient  Follow Up Time: 1 week    ROSEMARY MARAVILLA  Internal Medicine  [address] [place] [address]  Phone: [phone]  Fax: [phone]  Established Patient  Follow Up Time: 1 week    Jaren Bell)  Infectious Disease; Internal Medicine  [address] [place] [address]  Phone: [phone]  Fax: [phone]  Established Patient  Follow Up Time: 1 week    Brian López (DPM)  [place] [facility]  [address] [place] [address]  Phone: [phone]  Fax: [phone]  Follow Up Time: 1 week    SHANTI MOORE  Hematology/Oncology  [address] [place] [address]  Phone: [phone]  Fax: [phone]  Established Patient  Follow Up Time: 1 week    Fady Mercer)  Surgery  [address] [facility], [place] [address]  Phone: [phone]  Fax: ()-  Follow Up Time:

## 2021-09-29 NOTE — PROGRESS NOTE ADULT - ASSESSMENT
80M with R foot partial first ray resection dehiscence   - Pt seen and evaluated   - VSS, afebrile, no leukocytosis  - R foot surgical site proximal sutures intact, distal sutures dehisced, no further hematoma. Surgical flaps are warm and viable, no erythema, no malodor, no signs of acute infection. L foot is unremarkable for any signs of infection   - Surgical site is granular with no signs of infection   - Wound packed with 1/4 in iodoform packing to prevent further hematoma formation   - Wound care orders placed   - Pod stable for discharge, info in discharge paperwork  - Discussed with attending

## 2021-09-29 NOTE — PROGRESS NOTE ADULT - PROBLEM SELECTOR PLAN 1
With RLE cellulitis  - Initial blood cultures 9/15 positive for MSSA bacteremia  - Repeat BCx from 9/16 NGTD  - S/p R. hallux amputation 9/20  Surgical pathology of right hallux positive for acute osteomyelitis, MSSA   - Continue cefazolin 2g q8h per ID recs at [facility]. Plan is to continue 4-6 weeks abx depending on result of CORBIN (as late as 10/27)  - S/P PICC line placement 9/22 RUE  - Pain well controlled w/ current regimen   - S/p RRT 9/23 for ruptured hematoma @ surgical site during PT, wound irrigated and packed, left open to heal by secondary intention  - Outpatient podiatrist dr Felipe. Podiatry reconsulted. Will f/u recs.   - Cardiology/EP following. CORBIN performed 9/27 without evidence of vegetations. Plan for ICD extraction Wednesday AM. Will send COVID swab today, maintain active T&S, hold heparin midnight and NPO after midnight tonight.   -Id following. Will appreciate further recs.   -Per ID pt should also have mediport taken out to prevent recurrence. IR will perform mediport removal on 9/30. With RLE cellulitis  - Initial blood cultures 9/15 positive for MSSA bacteremia  - Repeat BCx from 9/16 NGTD  - S/p R. hallux amputation 9/20  Surgical pathology of right hallux positive for acute osteomyelitis, MSSA   - Continue cefazolin 2g q8h per ID recs at [place]. Plan is to continue 4-6 weeks abx depending on result of CORBIN (as late as 10/27)  - S/P PICC line placement 9/22 RUE  - Pain well controlled w/ current regimen   - S/p RRT 9/23 for ruptured hematoma @ surgical site during PT, wound irrigated and packed, left open to heal by secondary intention  - Outpatient podiatrist dr Felipe. Podiatry reconsulted. Will f/u recs.   - Cardiology/EP following. CORBIN performed 9/27 without evidence of vegetations.   -ICD extraction performed 9/28 without complications.   -Id following. Will appreciate further recs.   -Per ID pt should also have mediport taken out to prevent recurrence. IR will perform mediport removal on 9/30.

## 2021-09-29 NOTE — DISCHARGE NOTE PROVIDER - NPI NUMBER (FOR SYSADMIN USE ONLY) :
[[id number]] [[id number]],[[id number]],[[id number]],[[id number]],[[id number]],[[id number]],[[id number]] [[id number]],[[id number]],[[id number]],[[id number]],[[id number]],[[id number]],[[id number]]

## 2021-09-30 LAB
ANION GAP SERPL CALC-SCNC: 11 MMOL/L — SIGNIFICANT CHANGE UP (ref 5–17)
APTT BLD: 178.5 SEC — CRITICAL HIGH (ref 27.5–35.5)
APTT BLD: 99.8 SEC — HIGH (ref 27.5–35.5)
BUN SERPL-MCNC: 14 MG/DL — SIGNIFICANT CHANGE UP (ref 7–23)
CALCIUM SERPL-MCNC: 9.1 MG/DL — SIGNIFICANT CHANGE UP (ref 8.4–10.5)
CHLORIDE SERPL-SCNC: 100 MMOL/L — SIGNIFICANT CHANGE UP (ref 96–108)
CO2 SERPL-SCNC: 26 MMOL/L — SIGNIFICANT CHANGE UP (ref 22–31)
CREAT SERPL-MCNC: 0.91 MG/DL — SIGNIFICANT CHANGE UP (ref 0.5–1.3)
GLUCOSE SERPL-MCNC: 131 MG/DL — HIGH (ref 70–99)
HCT VFR BLD CALC: 28.4 % — LOW (ref 39–50)
HGB BLD-MCNC: 8.7 G/DL — LOW (ref 13–17)
INR BLD: 1.1 RATIO — SIGNIFICANT CHANGE UP (ref 0.88–1.16)
MAGNESIUM SERPL-MCNC: 1.8 MG/DL — SIGNIFICANT CHANGE UP (ref 1.6–2.6)
MCHC RBC-ENTMCNC: 30.6 GM/DL — LOW (ref 32–36)
MCHC RBC-ENTMCNC: 32.3 PG — SIGNIFICANT CHANGE UP (ref 27–34)
MCV RBC AUTO: 105.6 FL — HIGH (ref 80–100)
NRBC # BLD: 0 /100 WBCS — SIGNIFICANT CHANGE UP (ref 0–0)
PHOSPHATE SERPL-MCNC: 4.3 MG/DL — SIGNIFICANT CHANGE UP (ref 2.5–4.5)
PLATELET # BLD AUTO: 278 K/UL — SIGNIFICANT CHANGE UP (ref 150–400)
POTASSIUM SERPL-MCNC: 4.2 MMOL/L — SIGNIFICANT CHANGE UP (ref 3.5–5.3)
POTASSIUM SERPL-SCNC: 4.2 MMOL/L — SIGNIFICANT CHANGE UP (ref 3.5–5.3)
PROTHROM AB SERPL-ACNC: 13.1 SEC — SIGNIFICANT CHANGE UP (ref 10.6–13.6)
RBC # BLD: 2.69 M/UL — LOW (ref 4.2–5.8)
RBC # FLD: 15.6 % — HIGH (ref 10.3–14.5)
SARS-COV-2 RNA SPEC QL NAA+PROBE: SIGNIFICANT CHANGE UP
SODIUM SERPL-SCNC: 137 MMOL/L — SIGNIFICANT CHANGE UP (ref 135–145)
WBC # BLD: 5.02 K/UL — SIGNIFICANT CHANGE UP (ref 3.8–10.5)
WBC # FLD AUTO: 5.02 K/UL — SIGNIFICANT CHANGE UP (ref 3.8–10.5)

## 2021-09-30 PROCEDURE — 99232 SBSQ HOSP IP/OBS MODERATE 35: CPT

## 2021-09-30 PROCEDURE — 36590 REMOVAL TUNNELED CV CATH: CPT

## 2021-09-30 PROCEDURE — 77001 FLUOROGUIDE FOR VEIN DEVICE: CPT | Mod: 26

## 2021-09-30 PROCEDURE — 99233 SBSQ HOSP IP/OBS HIGH 50: CPT | Mod: 24,25

## 2021-09-30 PROCEDURE — 99233 SBSQ HOSP IP/OBS HIGH 50: CPT | Mod: GC

## 2021-09-30 RX ORDER — LANOLIN ALCOHOL/MO/W.PET/CERES
2 CREAM (GRAM) TOPICAL
Qty: 0 | Refills: 0 | DISCHARGE
Start: 2021-09-30

## 2021-09-30 RX ORDER — SACUBITRIL AND VALSARTAN 24; 26 MG/1; MG/1
1 TABLET, FILM COATED ORAL
Qty: 0 | Refills: 0 | DISCHARGE
Start: 2021-09-30

## 2021-09-30 RX ORDER — MAGNESIUM SULFATE 500 MG/ML
1 VIAL (ML) INJECTION ONCE
Refills: 0 | Status: COMPLETED | OUTPATIENT
Start: 2021-09-30 | End: 2021-09-30

## 2021-09-30 RX ORDER — SACUBITRIL AND VALSARTAN 24; 26 MG/1; MG/1
1 TABLET, FILM COATED ORAL
Qty: 0 | Refills: 0 | DISCHARGE

## 2021-09-30 RX ORDER — HEPARIN SODIUM 5000 [USP'U]/ML
900 INJECTION INTRAVENOUS; SUBCUTANEOUS
Qty: 25000 | Refills: 0 | Status: DISCONTINUED | OUTPATIENT
Start: 2021-09-30 | End: 2021-10-01

## 2021-09-30 RX ADMIN — Medication 100 MILLIGRAM(S): at 05:00

## 2021-09-30 RX ADMIN — PANTOPRAZOLE SODIUM 40 MILLIGRAM(S): 20 TABLET, DELAYED RELEASE ORAL at 05:01

## 2021-09-30 RX ADMIN — Medication 2 CAPSULE(S): at 17:58

## 2021-09-30 RX ADMIN — Medication 0.25 MILLIGRAM(S): at 23:17

## 2021-09-30 RX ADMIN — OXYCODONE AND ACETAMINOPHEN 2 TABLET(S): 5; 325 TABLET ORAL at 22:35

## 2021-09-30 RX ADMIN — OXYCODONE AND ACETAMINOPHEN 1 TABLET(S): 5; 325 TABLET ORAL at 04:56

## 2021-09-30 RX ADMIN — DORZOLAMIDE HYDROCHLORIDE TIMOLOL MALEATE 1 DROP(S): 20; 5 SOLUTION/ DROPS OPHTHALMIC at 05:00

## 2021-09-30 RX ADMIN — SACUBITRIL AND VALSARTAN 1 TABLET(S): 24; 26 TABLET, FILM COATED ORAL at 06:35

## 2021-09-30 RX ADMIN — Medication 1 TABLET(S): at 11:43

## 2021-09-30 RX ADMIN — OXYCODONE AND ACETAMINOPHEN 1 TABLET(S): 5; 325 TABLET ORAL at 12:30

## 2021-09-30 RX ADMIN — Medication 100 MILLIGRAM(S): at 11:44

## 2021-09-30 RX ADMIN — Medication 40 MILLIGRAM(S): at 05:01

## 2021-09-30 RX ADMIN — OXYCODONE AND ACETAMINOPHEN 1 TABLET(S): 5; 325 TABLET ORAL at 11:43

## 2021-09-30 RX ADMIN — Medication 100 GRAM(S): at 08:20

## 2021-09-30 RX ADMIN — Medication 1 DROP(S): at 11:41

## 2021-09-30 RX ADMIN — Medication 20 MILLIGRAM(S): at 05:00

## 2021-09-30 RX ADMIN — OXYCODONE AND ACETAMINOPHEN 2 TABLET(S): 5; 325 TABLET ORAL at 22:05

## 2021-09-30 RX ADMIN — HEPARIN SODIUM 0 UNIT(S)/HR: 5000 INJECTION INTRAVENOUS; SUBCUTANEOUS at 06:08

## 2021-09-30 RX ADMIN — LATANOPROST 1 DROP(S): 0.05 SOLUTION/ DROPS OPHTHALMIC; TOPICAL at 23:21

## 2021-09-30 RX ADMIN — OXYCODONE AND ACETAMINOPHEN 1 TABLET(S): 5; 325 TABLET ORAL at 05:26

## 2021-09-30 RX ADMIN — HEPARIN SODIUM 900 UNIT(S)/HR: 5000 INJECTION INTRAVENOUS; SUBCUTANEOUS at 19:13

## 2021-09-30 RX ADMIN — TAMSULOSIN HYDROCHLORIDE 0.4 MILLIGRAM(S): 0.4 CAPSULE ORAL at 23:20

## 2021-09-30 RX ADMIN — OXYCODONE AND ACETAMINOPHEN 2 TABLET(S): 5; 325 TABLET ORAL at 16:48

## 2021-09-30 RX ADMIN — Medication 6 MILLIGRAM(S): at 23:21

## 2021-09-30 RX ADMIN — DORZOLAMIDE HYDROCHLORIDE TIMOLOL MALEATE 1 DROP(S): 20; 5 SOLUTION/ DROPS OPHTHALMIC at 17:59

## 2021-09-30 RX ADMIN — OXYCODONE AND ACETAMINOPHEN 2 TABLET(S): 5; 325 TABLET ORAL at 17:56

## 2021-09-30 RX ADMIN — Medication 100 MILLIGRAM(S): at 16:48

## 2021-09-30 RX ADMIN — Medication 100 MILLIGRAM(S): at 22:06

## 2021-09-30 RX ADMIN — CHLORHEXIDINE GLUCONATE 1 APPLICATION(S): 213 SOLUTION TOPICAL at 05:00

## 2021-09-30 RX ADMIN — HEPARIN SODIUM 900 UNIT(S)/HR: 5000 INJECTION INTRAVENOUS; SUBCUTANEOUS at 07:19

## 2021-09-30 RX ADMIN — DULOXETINE HYDROCHLORIDE 60 MILLIGRAM(S): 30 CAPSULE, DELAYED RELEASE ORAL at 17:57

## 2021-09-30 RX ADMIN — GABAPENTIN 300 MILLIGRAM(S): 400 CAPSULE ORAL at 05:01

## 2021-09-30 RX ADMIN — GABAPENTIN 300 MILLIGRAM(S): 400 CAPSULE ORAL at 17:57

## 2021-09-30 NOTE — PROGRESS NOTE ADULT - PROBLEM SELECTOR PLAN 3
- Bilateral PE in 7/2020, on Xarelto   - Holding xarelto, on heparin drip in setting of upcoming procedures  -Will restart xarelto when able. Currently saturating well on Room air without evidence of acute volume overload.   - TTE 9/18 not well visualized LV, severe Mitral stenosis, left atrial enlargement   - CORBIN 9/27 with estimated EF 30-35%  - continue sotalol with hold parameters  - continue entresto with hold parameters  - continue lasix with hold parameters  - Monitor and replete lytes, keep K>4, Mg>2.  - Strict Is/Os, daily weights.

## 2021-09-30 NOTE — PROGRESS NOTE ADULT - ASSESSMENT
Mr. 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 Given recent TAVR, transfered [facility] for CORBIN and consideration of ICD extraction. Mr. 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 Given recent TAVR, transferred [facility] for CORBIN and consideration of ICD extraction.

## 2021-09-30 NOTE — PROGRESS NOTE ADULT - SUBJECTIVE AND OBJECTIVE BOX
PROGRESS NOTE:   Authored by Fady Saenz MD, PGY1 [facility] Pager [phone] [facility] pager [phone]    Patient is a 80y old  Male who presents with a chief complaint of osteomylitis, c/f endocarditis (29 Sep 2021 18:11)      SUBJECTIVE / OVERNIGHT EVENTS:     REVIEW OF SYSTEMS:    CONSTITUTIONAL: No weakness, fevers or chills  EYES/ENT: No visual changes;  No vertigo or throat pain   NECK: No pain or stiffness  RESPIRATORY: No cough, wheezing, hemoptysis; No shortness of breath  CARDIOVASCULAR: No chest pain or palpitations  GASTROINTESTINAL: No abdominal or epigastric pain. No nausea, vomiting, or hematemesis; No diarrhea or constipation. No melena or hematochezia.  GENITOURINARY: No dysuria, frequency or hematuria  NEUROLOGICAL: No numbness or weakness  SKIN: No itching, rashes    MEDICATIONS  (STANDING):  ceFAZolin   IVPB 2000 milliGRAM(s) IV Intermittent every 8 hours  chlorhexidine 4% Liquid 1 Application(s) Topical <User Schedule>  dorzolamide 2%/timolol 0.5% Ophthalmic Solution 1 Drop(s) Both EYES two times a day  DULoxetine 60 milliGRAM(s) Oral daily  furosemide    Tablet 20 milliGRAM(s) Oral daily  gabapentin 300 milliGRAM(s) Oral two times a day  heparin  Infusion. 1000 Unit(s)/Hr (10 mL/Hr) IV Continuous <Continuous>  influenza   Vaccine 0.5 milliLiter(s) IntraMuscular once  ketorolac 0.5% Ophthalmic Solution 1 Drop(s) Both EYES daily  latanoprost 0.005% Ophthalmic Solution 1 Drop(s) Both EYES at bedtime  melatonin 6 milliGRAM(s) Oral at bedtime  multivitamin 1 Tablet(s) Oral daily  pancrelipase  (CREON 36,000 Lipase Units) 2 Capsule(s) Oral three times a day with meals  pantoprazole    Tablet 40 milliGRAM(s) Oral before breakfast  pyridoxine 100 milliGRAM(s) Oral daily  sacubitril 49 mG/valsartan 51 mG 1 Tablet(s) Oral two times a day  sotalol 40 milliGRAM(s) Oral every 12 hours  tamsulosin 0.4 milliGRAM(s) Oral at bedtime    MEDICATIONS  (PRN):  acetaminophen   Tablet .. 650 milliGRAM(s) Oral every 4 hours PRN Mild Pain (1 - 3)  ALPRAZolam 0.25 milliGRAM(s) Oral three times a day PRN Anxiety/agitation  heparin   Injectable 8000 Unit(s) IV Push every 6 hours PRN For aPTT less than 40  heparin   Injectable 4000 Unit(s) IV Push every 6 hours PRN For aPTT between 40 - 57  oxycodone    5 mG/acetaminophen 325 mG 1 Tablet(s) Oral every 4 hours PRN Moderate Pain (4 - 6)  oxycodone    5 mG/acetaminophen 325 mG 2 Tablet(s) Oral every 4 hours PRN Severe Pain (7 - 10)      CAPILLARY BLOOD GLUCOSE        I&O's Summary    29 Sep 2021 07:01  -  30 Sep 2021 07:00  --------------------------------------------------------  IN: 480 mL / OUT: 1450 mL / NET: -970 mL        PHYSICAL EXAM:  Vital Signs Last 24 Hrs  T(C): 36.4 (30 Sep 2021 04:34), Max: 36.8 (30 Sep 2021 03:30)  T(F): 97.6 (30 Sep 2021 04:34), Max: 98.2 (30 Sep 2021 03:30)  HR: 81 (30 Sep 2021 06:30) (76 - 92)  BP: 117/72 (30 Sep 2021 06:30) (100/64 - 118/70)  BP(mean): --  RR: 18 (30 Sep 2021 04:34) (18 - 18)  SpO2: 95% (30 Sep 2021 04:34) (95% - 99%)    CONSTITUTIONAL: NAD, well-developed  RESPIRATORY: Normal respiratory effort; lungs are clear to auscultation bilaterally  CARDIOVASCULAR: Regular rate and rhythm, normal S1 and S2, no murmur/rub/gallop; No lower extremity edema; Peripheral pulses are 2+ bilaterally  ABDOMEN: Nontender to palpation, normoactive bowel sounds, no rebound/guarding; No hepatosplenomegaly  MUSCLOSKELETAL: no clubbing or cyanosis of digits; no joint swelling or tenderness to palpation  PSYCH: A+O to person, place, and time; affect appropriate  NEURO: Non-focal, no tremors  SKIN: No rashes    LABS:                        8.7    5.02  )-----------( 278      ( 30 Sep 2021 05:13 )             28.4     09-30    137  |  100  |  14  ----------------------------<  131<H>  4.2   |  26  |  0.91    Ca    9.1      30 Sep 2021 05:13  Phos  4.3     09-30  Mg     1.8     09-30      PT/INR - ( 30 Sep 2021 05:13 )   PT: 13.1 sec;   INR: 1.10 ratio         PTT - ( 30 Sep 2021 05:13 )  PTT:178.5 sec          Culture - Acid Fast - Other w/Smear (collected 29 Sep 2021 01:49)  Source: .Other icd lead tip    Culture - Fungal, Other (collected 29 Sep 2021 01:48)  Source: .Other icd lead tip  Preliminary Report (29 Sep 2021 09:00):    Testing in progress    Culture - Surgical Swab (collected 29 Sep 2021 01:46)  Source: .Surgical Swab icd lead tip  Preliminary Report (29 Sep 2021 18:50):    No growth        RADIOLOGY & ADDITIONAL TESTS:  No new imaging or tests    COORDINATION OF CARE:  Care Discussed with Consultants/Other Providers [Y/N]:  Prior or Outpatient Records Reviewed [Y/N]:   PROGRESS NOTE:   Authored by Fady Saenz MD, PGY1 [facility] Pager [phone] [facility] pager [phone]    Patient is a 80y old  Male who presents with a chief complaint of osteomylitis, c/f endocarditis (29 Sep 2021 18:11)      SUBJECTIVE / OVERNIGHT EVENTS: No acute events overnight. Tele 4 beats NSVT, 3 seconds PAT to 130, asymptomatic. Pt feels well today and in good spirits. Pain in foot overall improved.     REVIEW OF SYSTEMS:    CONSTITUTIONAL: No weakness, fevers or chills  EYES/ENT: No visual changes;  No vertigo or throat pain   NECK: No pain or stiffness  RESPIRATORY: No cough, wheezing, hemoptysis; No shortness of breath  CARDIOVASCULAR: No chest pain or palpitations  GASTROINTESTINAL: No abdominal or epigastric pain. No nausea, vomiting, or hematemesis; No diarrhea or constipation. No melena or hematochezia.  GENITOURINARY: No dysuria, frequency or hematuria  NEUROLOGICAL: No numbness or weakness  SKIN: No itching, rashes    MEDICATIONS  (STANDING):  ceFAZolin   IVPB 2000 milliGRAM(s) IV Intermittent every 8 hours  chlorhexidine 4% Liquid 1 Application(s) Topical <User Schedule>  dorzolamide 2%/timolol 0.5% Ophthalmic Solution 1 Drop(s) Both EYES two times a day  DULoxetine 60 milliGRAM(s) Oral daily  furosemide    Tablet 20 milliGRAM(s) Oral daily  gabapentin 300 milliGRAM(s) Oral two times a day  heparin  Infusion. 1000 Unit(s)/Hr (10 mL/Hr) IV Continuous <Continuous>  influenza   Vaccine 0.5 milliLiter(s) IntraMuscular once  ketorolac 0.5% Ophthalmic Solution 1 Drop(s) Both EYES daily  latanoprost 0.005% Ophthalmic Solution 1 Drop(s) Both EYES at bedtime  melatonin 6 milliGRAM(s) Oral at bedtime  multivitamin 1 Tablet(s) Oral daily  pancrelipase  (CREON 36,000 Lipase Units) 2 Capsule(s) Oral three times a day with meals  pantoprazole    Tablet 40 milliGRAM(s) Oral before breakfast  pyridoxine 100 milliGRAM(s) Oral daily  sacubitril 49 mG/valsartan 51 mG 1 Tablet(s) Oral two times a day  sotalol 40 milliGRAM(s) Oral every 12 hours  tamsulosin 0.4 milliGRAM(s) Oral at bedtime    MEDICATIONS  (PRN):  acetaminophen   Tablet .. 650 milliGRAM(s) Oral every 4 hours PRN Mild Pain (1 - 3)  ALPRAZolam 0.25 milliGRAM(s) Oral three times a day PRN Anxiety/agitation  heparin   Injectable 8000 Unit(s) IV Push every 6 hours PRN For aPTT less than 40  heparin   Injectable 4000 Unit(s) IV Push every 6 hours PRN For aPTT between 40 - 57  oxycodone    5 mG/acetaminophen 325 mG 1 Tablet(s) Oral every 4 hours PRN Moderate Pain (4 - 6)  oxycodone    5 mG/acetaminophen 325 mG 2 Tablet(s) Oral every 4 hours PRN Severe Pain (7 - 10)      CAPILLARY BLOOD GLUCOSE        I&O's Summary    29 Sep 2021 07:01  -  30 Sep 2021 07:00  --------------------------------------------------------  IN: 480 mL / OUT: 1450 mL / NET: -970 mL        PHYSICAL EXAM:  Vital Signs Last 24 Hrs  T(C): 36.4 (30 Sep 2021 04:34), Max: 36.8 (30 Sep 2021 03:30)  T(F): 97.6 (30 Sep 2021 04:34), Max: 98.2 (30 Sep 2021 03:30)  HR: 81 (30 Sep 2021 06:30) (76 - 92)  BP: 117/72 (30 Sep 2021 06:30) (100/64 - 118/70)  BP(mean): --  RR: 18 (30 Sep 2021 04:34) (18 - 18)  SpO2: 95% (30 Sep 2021 04:34) (95% - 99%)    CONSTITUTIONAL: NAD, well-developed  RESPIRATORY: Normal respiratory effort; lungs are clear to auscultation bilaterally  CARDIOVASCULAR: Regular rate and rhythm, normal S1 and S2, no murmur/rub/gallop; No lower extremity edema; Peripheral pulses are 2+ bilaterally  ABDOMEN: Nontender to palpation, normoactive bowel sounds, no rebound/guarding; No hepatosplenomegaly  MUSCLOSKELETAL: no clubbing or cyanosis of digits; R. Foot dressed with dressing clean and dry.   PSYCH: A+O to person, place, and time; affect appropriate  NEURO: Non-focal, no tremors  SKIN: No rashes    LABS:                        8.7    5.02  )-----------( 278      ( 30 Sep 2021 05:13 )             28.4     09-30    137  |  100  |  14  ----------------------------<  131<H>  4.2   |  26  |  0.91    Ca    9.1      30 Sep 2021 05:13  Phos  4.3     09-30  Mg     1.8     09-30      PT/INR - ( 30 Sep 2021 05:13 )   PT: 13.1 sec;   INR: 1.10 ratio         PTT - ( 30 Sep 2021 05:13 )  PTT:178.5 sec          Culture - Acid Fast - Other w/Smear (collected 29 Sep 2021 01:49)  Source: .Other icd lead tip    Culture - Fungal, Other (collected 29 Sep 2021 01:48)  Source: .Other icd lead tip  Preliminary Report (29 Sep 2021 09:00):    Testing in progress    Culture - Surgical Swab (collected 29 Sep 2021 01:46)  Source: .Surgical Swab icd lead tip  Preliminary Report (29 Sep 2021 18:50):    No growth        RADIOLOGY & ADDITIONAL TESTS:  No new imaging or tests    COORDINATION OF CARE:  Care Discussed with Consultants/Other Providers [Y/N]:  Prior or Outpatient Records Reviewed [Y/N]:

## 2021-09-30 NOTE — PROGRESS NOTE ADULT - SUBJECTIVE AND OBJECTIVE BOX
[facility] - Milo Thomas, Adolfo, Binta, Brittanie, Javier Don  Office Number:  [phone]    Patient resting comfortably in bed in NAD.  Laying flat with no respiratory distress.  No complaints of chest pain, dyspnea, palpitations, PND, or orthopnea.    ROS: negative unless otherwise mentioned.    Telemetry:  sr, pat 3.1 s, 4 nsvt    MEDICATIONS  (STANDING):  ceFAZolin   IVPB 2000 milliGRAM(s) IV Intermittent every 8 hours  chlorhexidine 4% Liquid 1 Application(s) Topical <User Schedule>  dorzolamide 2%/timolol 0.5% Ophthalmic Solution 1 Drop(s) Both EYES two times a day  DULoxetine 60 milliGRAM(s) Oral daily  furosemide    Tablet 20 milliGRAM(s) Oral daily  gabapentin 300 milliGRAM(s) Oral two times a day  influenza   Vaccine 0.5 milliLiter(s) IntraMuscular once  ketorolac 0.5% Ophthalmic Solution 1 Drop(s) Both EYES daily  latanoprost 0.005% Ophthalmic Solution 1 Drop(s) Both EYES at bedtime  melatonin 6 milliGRAM(s) Oral at bedtime  multivitamin 1 Tablet(s) Oral daily  pancrelipase  (CREON 36,000 Lipase Units) 2 Capsule(s) Oral three times a day with meals  pantoprazole    Tablet 40 milliGRAM(s) Oral before breakfast  pyridoxine 100 milliGRAM(s) Oral daily  sacubitril 49 mG/valsartan 51 mG 1 Tablet(s) Oral two times a day  sotalol 40 milliGRAM(s) Oral every 12 hours  tamsulosin 0.4 milliGRAM(s) Oral at bedtime    MEDICATIONS  (PRN):  acetaminophen   Tablet .. 650 milliGRAM(s) Oral every 4 hours PRN Mild Pain (1 - 3)  ALPRAZolam 0.25 milliGRAM(s) Oral three times a day PRN Anxiety/agitation  heparin   Injectable 8000 Unit(s) IV Push every 6 hours PRN For aPTT less than 40  heparin   Injectable 4000 Unit(s) IV Push every 6 hours PRN For aPTT between 40 - 57  oxycodone    5 mG/acetaminophen 325 mG 1 Tablet(s) Oral every 4 hours PRN Moderate Pain (4 - 6)  oxycodone    5 mG/acetaminophen 325 mG 2 Tablet(s) Oral every 4 hours PRN Severe Pain (7 - 10)      Allergies    No Known Allergies    Intolerances        Vital Signs Last 24 Hrs  T(C): 36.4 (30 Sep 2021 04:34), Max: 36.8 (30 Sep 2021 03:30)  T(F): 97.6 (30 Sep 2021 04:34), Max: 98.2 (30 Sep 2021 03:30)  HR: 81 (30 Sep 2021 06:30) (76 - 92)  BP: 117/72 (30 Sep 2021 06:30) (100/64 - 118/70)  BP(mean): --  RR: 18 (30 Sep 2021 04:34) (18 - 18)  SpO2: 95% (30 Sep 2021 04:34) (95% - 99%)    I&O's Summary    29 Sep 2021 07:01  -  30 Sep 2021 07:00  --------------------------------------------------------  IN: 480 mL / OUT: 1450 mL / NET: -970 mL        ON EXAM:    Constitutional: well-nourished, well-developed, NAD   HEENT:  MMM, sclerae anicteric, conjunctivae clear, no oral cyanosis.  Pulmonary: Non-labored, breath sounds are clear bilaterally, No wheezing, rales or rhonchi  Cardiovascular: Regular, S1 and S2.  No murmur.  No rubs, gallops or clicks  Gastrointestinal: Bowel Sounds present, soft, nontender.   Lymph: No peripheral edema. right foot bandage cdi  Neurological: Alert, no focal deficits  Skin: No rashes.  Psych:  Mood & affect appropriate    LABS: All Labs Reviewed:                        8.7    5.02  )-----------( 278      ( 30 Sep 2021 05:13 )             28.4                         9.3    6.44  )-----------( 317      ( 29 Sep 2021 13:39 )             30.8                         8.8    5.18  )-----------( 309      ( 29 Sep 2021 07:03 )             28.1     30 Sep 2021 05:13    137    |  100    |  14     ----------------------------<  131    4.2     |  26     |  0.91   29 Sep 2021 07:03    136    |  101    |  14     ----------------------------<  92     4.6     |  25     |  0.75   28 Sep 2021 06:06    137    |  101    |  14     ----------------------------<  122    4.2     |  25     |  0.75     Ca    9.1        30 Sep 2021 05:13  Ca    9.2        29 Sep 2021 07:03  Ca    9.0        28 Sep 2021 06:06  Phos  4.3       30 Sep 2021 05:13  Phos  4.5       29 Sep 2021 07:03  Phos  3.9       28 Sep 2021 06:06  Mg     1.8       30 Sep 2021 05:13  Mg     1.9       29 Sep 2021 07:03  Mg     2.0       28 Sep 2021 06:06      PT/INR - ( 30 Sep 2021 05:13 )   PT: 13.1 sec;   INR: 1.10 ratio         PTT - ( 30 Sep 2021 05:13 )  PTT:178.5 sec      Blood Culture: Organism --  Gram Stain Blood -- Gram Stain --  Specimen Source .Other icd lead tip  Culture-Blood --    Organism --  Gram Stain Blood -- Gram Stain --  Specimen Source .Other icd lead tip  Culture-Blood --    Organism --  Gram Stain Blood -- Gram Stain --  Specimen Source .Surgical Swab icd lead tip  Culture-Blood --

## 2021-09-30 NOTE — PROGRESS NOTE ADULT - SUBJECTIVE AND OBJECTIVE BOX
IR Post Procedure Note    Diagnosis: Port no longer required    Procedure: Chest port removal    : Francisco Javier Garcia MD    Contrast: None    Anesthesia: 1% Lidocaine Subcutaneous, Sedation administered by Anesthesiology/ Moderate Sedation    Estimated Blood Loss: Less than 10cc    Specimens: None    Complications: No Immediate Complications    Anticoagulation: Resume in 24 Hours    Findings & Plan: RIJV chest port removed in its entirety following lidocaine infusion and blunt dissection. No remaining foreign body, no signs for bleeding. Port pocket closed with vicryl sutures and dermabond.      Please call Interventional Radiology with any questions, concerns, or issues.

## 2021-09-30 NOTE — PROGRESS NOTE ADULT - PROBLEM SELECTOR PLAN 1
With RLE cellulitis  - Initial blood cultures 9/15 positive for MSSA bacteremia  - Repeat BCx from 9/16 NGTD  - S/p R. hallux amputation 9/20  Surgical pathology of right hallux positive for acute osteomyelitis, MSSA   - Continue cefazolin 2g q8h per ID recs at [facility]. Plan is to continue 4-6 weeks abx depending on result of CORBIN (as late as 10/27)  - S/P PICC line placement 9/22 RUE  - Pain well controlled w/ current regimen   - S/p RRT 9/23 for ruptured hematoma @ surgical site during PT, wound irrigated and packed, left open to heal by secondary intention  - Outpatient podiatrist dr Felipe. Podiatry reconsulted. Will f/u recs.   - Cardiology/EP following. CORBIN performed 9/27 without evidence of vegetations.   -ICD extraction performed 9/28 without complications.   -Id following. Will appreciate further recs.   -Per ID pt should also have mediport taken out to prevent recurrence. IR will perform mediport removal today.

## 2021-09-30 NOTE — PROGRESS NOTE ADULT - PROBLEM SELECTOR PLAN 4
Likely anemia of chronic disease in setting of pancreatic cancer  - H/h stable  - Currently hemodynamically stable, monitor for signs and symptoms of active bleeding especially while on therapeutic AC  - Transfuse for hemoglobin <8  -Maintain active type and screen - Bilateral PE in 7/2020, on Xarelto   - Holding xarelto, on heparin drip in setting of upcoming procedures  -Will restart xarelto when able. - Bilateral PE in 7/2020, on Xarelto   - Holding xarelto, on heparin drip in setting of upcoming procedures  - Will restart xarelto when able.

## 2021-09-30 NOTE — PROGRESS NOTE ADULT - ASSESSMENT
80M PMH of pancreatic cancer (3/2021), HTN, HLD, CHF, CAD s/p stents, ICD, TAVR, b/l PE, spinal stenosis s/p dylon placement, GERD, BPH, L. hip replacement x3, presenting with R. hallux OM s/p amputation.     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   s/p R hallux amputation on 9/20  Operative Cultures with CoNS and Pseudomonas but pathology clear  Unclear if these cultures are dirty bone or clean bone  However, in light of negative path and stable exam would not cover for the Pseudomonas for now    Concerning context for MSSA Bacteremia given mediport, AICD and TAVR  CORBIN without vegetations  s/p AICD removal 9/28  Planned for Mediport removal    Plan to treat for minimum of 4 weeks with Cefazolin from negative blood cultures (9/16 -->)    Overall, MSSA Bacteremia, Toe OM, Positive Culture Finding (Pseudomonas), AICD in place, Mediport in place, s/p TAVR    -Continue Cefazolin 2g IV Q8H  -s/p AICD removal 9/28  -Given cleared repeat BCx, negative CORBIN and stable clinical status would have no absolute ID contraindication for replacing AICD within 72H from extraction (Friday)  -Given cleared repeat BCx, negative CORBIN and stable clinical status would have no absolute ID contraindication for proceeding with Whipple procedure (called and discussed case with Surgical Oncologist Dr. Shon Murphy)  -Planned for mediport extraction  -Continue to follow CBC with diff  -Continue to follow temperature curve    I will continue to follow. Please feel free to contact me with any further questions.    Sanjay Andres M.D.  [facility] Division of Infectious Disease  8AM-5PM: Pager Number [phone]  After Hours (or if no response): Please contact the Infectious Diseases Office at [phone]

## 2021-09-30 NOTE — PROVIDER CONTACT NOTE (OTHER) - ASSESSMENT
Pt is A&O x4, able to make needs known. Received pt resting in bed, no complaints of dizziness, SOB or pain. Manual BP taken x2, BP 84/48. Pt is asymptomatic.

## 2021-09-30 NOTE — PROGRESS NOTE ADULT - ASSESSMENT
80 year old male with PMH pancreatic cancer (dx 3/2021, currently undergoing chemo), HTN, HLD, CHFrEF, CAD s/p stents x2 (~15 years ago), Abbott ICD with Riata lead from 1/19/05, TAVR (4/2021), bilateral PE (7/2021) on Xarelto, spinal stenosis, GERD, BPH, macular degeneration initially presented to [facility] c/o R foot pain admitted for R hallux pressure injury and RLE cellulitis found to have MSSA bacteremia and acute OM of right hallux. At [place], S/p R. hallux amputation 9/20 and RRT 9/23 for ruptured hematoma @ surgical site during PT, wound irrigated and packed, left open to heal by secondary intention. Bcx 9/15 growing MSSA, with clearance on Bcx 9/16. Given recent TAVR, transfered [facility] for CORBIN and consideration of ICD extraction. Device interrogation revealed one episode of paroxysmal AT 9/26/21 at ~200bpm. Met rate for VT zone. Received ATPx4 & 1 36J shock s/p termination. No other therapies. Pt states he has never received shocks post implant.     1. RLE cellulitis and acute OM of right hallux s/p Rt 1st partial ray resection.   2. Chronic systolic HF s/p single chamber Abbott ICD.   3. HTN  4. s/p TAVR 4/2021  5. Bilateral PE (7/2021)     - MSSA bacteremia on admission BCx 9/15; repeat BCx from 9/16 are NGTD. CORBIN without vegetations.  - Pt is s/p ICD extraction 9/28/21. PA/Lateral CXR - no pneumothorax. Clear lungs  - Wound site with dressing in place. C/D/I. Plan to remove today, post Mediport removal.   - Pt's LVEF 35%, hx of VT. S-ICD and Lifevest discussed with patient; pt prefers S-ICD.   -- Cleared with ID. OK to proceed 72hrs from extraction date (9/29). Will plan for next week, Monday.   - Replete lytes. Keep MG>2, K>4.   - Continue home dose Sotalol 40mg q12 hr. He maintains on low dose sotalol due to hx of sustained VT in 2016 that was pace terminated by ATP.   - Antibiotics per ID. On Cefazolin.

## 2021-09-30 NOTE — PROGRESS NOTE ADULT - SUBJECTIVE AND OBJECTIVE BOX
Follow Up:  Bacteremia    Interval History: afebrile. planned for mediport removal today. no acute events.     REVIEW OF SYSTEMS  [  ] ROS unobtainable because:    [ x ] All other systems negative except as noted below    Constitutional:  [ ] fever [ ] chills  [ ] weight loss  [ ] weakness  Skin:  [ ] rash [ ] phlebitis	  Eyes: [ ] icterus [ ] pain  [ ] discharge	  ENMT: [ ] sore throat  [ ] thrush [ ] ulcers [ ] exudates  Respiratory: [ ] dyspnea [ ] hemoptysis [ ] cough [ ] sputum	  Cardiovascular:  [ ] chest pain [ ] palpitations [ ] edema	  Gastrointestinal:  [ ] nausea [ ] vomiting [ ] diarrhea [ ] constipation [ ] pain	  Genitourinary:  [ ] dysuria [ ] frequency [ ] hematuria [ ] discharge [ ] flank pain  [ ] incontinence  Musculoskeletal:  [ ] myalgias [ ] arthralgias [ ] arthritis  [ ] back pain  Neurological:  [ ] headache [ ] seizures  [ ] confusion/altered mental status    Allergies  No Known Allergies        ANTIMICROBIALS:  ceFAZolin   IVPB 2000 every 8 hours      OTHER MEDS:  MEDICATIONS  (STANDING):  acetaminophen   Tablet .. 650 every 4 hours PRN  ALPRAZolam 0.25 three times a day PRN  DULoxetine 60 daily  furosemide    Tablet 20 daily  gabapentin 300 two times a day  heparin   Injectable 8000 every 6 hours PRN  heparin   Injectable 4000 every 6 hours PRN  influenza   Vaccine 0.5 once  melatonin 6 at bedtime  oxycodone    5 mG/acetaminophen 325 mG 1 every 4 hours PRN  oxycodone    5 mG/acetaminophen 325 mG 2 every 4 hours PRN  pancrelipase  (CREON 36,000 Lipase Units) 2 three times a day with meals  pantoprazole    Tablet 40 before breakfast  sacubitril 49 mG/valsartan 51 mG 1 two times a day  sotalol 40 every 12 hours  tamsulosin 0.4 at bedtime      Vital Signs Last 24 Hrs  T(C): 36.3 (30 Sep 2021 14:45), Max: 36.8 (30 Sep 2021 03:30)  T(F): 97.4 (30 Sep 2021 14:45), Max: 98.2 (30 Sep 2021 03:30)  HR: 90 (30 Sep 2021 16:00) (68 - 92)  BP: 91/50 (30 Sep 2021 16:00) (91/50 - 118/70)  BP(mean): 63 (30 Sep 2021 16:00) (63 - 77)  RR: 12 (30 Sep 2021 16:00) (0 - 18)  SpO2: 96% (30 Sep 2021 16:00) (94% - 100%)    PHYSICAL EXAMINATION:  General: Alert and Awake, NAD  Cardiac: RRR, No M/R/G  Resp: CTAB, No Wh/Rh/Ra  Abdomen: NBS, NT/ND, No HSM, No rigidity or guarding  MSK: +R foot with small area of dehiscence but without erythema, drainage or malodour. Rest of foot well healed and without wounds,  : No hernandez  Skin: No rashes or lesions. Skin is warm and dry to the touch.   Neuro: Alert and Awake. CN 2-12 Grossly intact. Moves all four extremities spontaneously.  Psych: Calm, Pleasant, Cooperative  Vasc: R Chest mediport                        8.7    5.02  )-----------( 278      ( 30 Sep 2021 05:13 )             28.4       09-30    137  |  100  |  14  ----------------------------<  131<H>  4.2   |  26  |  0.91    Ca    9.1      30 Sep 2021 05:13  Phos  4.3     09-30  Mg     1.8     09-30            MICROBIOLOGY:  v  .Other icd lead tip  09-29-21 --  --  --      .Other icd lead tip  09-29-21   Testing in progress  --  --      .Surgical Swab icd lead tip  09-29-21   No growth  --  --      .Tissue Right hallux bone culutre  09-21-21   Few Pseudomonas aeruginosa  Rare Staphylococcus pettenkoferi  --  Pseudomonas aeruginosa  Staphylococcus pettenkoferi      .Tissue right first metatarsal bone cu  09-21-21   Rare Pseudomonas aeruginosa  Rare Staphylococcus pettenkoferi  --  Pseudomonas aeruginosa  Staphylococcus pettenkoferi      .Blood Blood-Peripheral  09-16-21   No Growth Final  --  --      .Tissue right hallux bone culture  09-16-21   Few Staphylococcus aureus  --  Staphylococcus aureus      Clean Catch Clean Catch (Midstream)  09-15-21   <10,000 CFU/mL Normal Urogenital Juani  --  --      .Blood Blood-Peripheral  09-15-21   Growth in aerobic bottle: Staphylococcus aureus  See previous culture [id number]  --  Blood Culture PCR  Staphylococcus aureus    RADIOLOGY:    <The imaging below has been reviewed and visualized by me independently. Findings as detailed in report below>    < from: Xray Chest 2 Views PA/Lat (09.29.21 @ 12:37) >  IMPRESSION:  Clear lungs.    < end of copied text >

## 2021-09-30 NOTE — PROGRESS NOTE ADULT - PROBLEM SELECTOR PLAN 7
- Diagnosed in March 2021, on chemotherapy (does not recall name of medication)  - Continue home Pancrelipase 36,000 2 tabs PO TID with meals  - Per podiatry, Dr. López spoke with Dr. Murphy at [facility] to be postponed until after resolution of acute OM infection, but does not need to wait until full course abx completed per ID.   -Pain meds as above

## 2021-09-30 NOTE — PROGRESS NOTE ADULT - SUBJECTIVE AND OBJECTIVE BOX
24H hour events: Pt doing well overnight. Tele: sinus rhythm 80-90s. 4 beats WCT, 3 second paroxysmal AT overnight. Pt denies palpitations, SOB, chest pain. Plan for Mediport removal today.    MEDICATIONS:  furosemide    Tablet 20 milliGRAM(s) Oral daily  heparin   Injectable 8000 Unit(s) IV Push every 6 hours PRN  heparin   Injectable 4000 Unit(s) IV Push every 6 hours PRN  sacubitril 49 mG/valsartan 51 mG 1 Tablet(s) Oral two times a day  sotalol 40 milliGRAM(s) Oral every 12 hours  tamsulosin 0.4 milliGRAM(s) Oral at bedtime  ceFAZolin   IVPB 2000 milliGRAM(s) IV Intermittent every 8 hours  acetaminophen   Tablet .. 650 milliGRAM(s) Oral every 4 hours PRN  ALPRAZolam 0.25 milliGRAM(s) Oral three times a day PRN  DULoxetine 60 milliGRAM(s) Oral daily  gabapentin 300 milliGRAM(s) Oral two times a day  melatonin 6 milliGRAM(s) Oral at bedtime  oxycodone    5 mG/acetaminophen 325 mG 1 Tablet(s) Oral every 4 hours PRN  oxycodone    5 mG/acetaminophen 325 mG 2 Tablet(s) Oral every 4 hours PRN  pancrelipase  (CREON 36,000 Lipase Units) 2 Capsule(s) Oral three times a day with meals  pantoprazole    Tablet 40 milliGRAM(s) Oral before breakfast  chlorhexidine 4% Liquid 1 Application(s) Topical <User Schedule>  dorzolamide 2%/timolol 0.5% Ophthalmic Solution 1 Drop(s) Both EYES two times a day  influenza   Vaccine 0.5 milliLiter(s) IntraMuscular once  ketorolac 0.5% Ophthalmic Solution 1 Drop(s) Both EYES daily  latanoprost 0.005% Ophthalmic Solution 1 Drop(s) Both EYES at bedtime  multivitamin 1 Tablet(s) Oral daily  pyridoxine 100 milliGRAM(s) Oral daily      REVIEW OF SYSTEMS:  Complete 10point ROS negative.    PHYSICAL EXAM:  T(C): 36.4 (09-30-21 @ 04:34), Max: 36.8 (09-30-21 @ 03:30)  HR: 81 (09-30-21 @ 06:30) (76 - 92)  BP: 117/72 (09-30-21 @ 06:30) (100/64 - 118/70)  RR: 18 (09-30-21 @ 04:34) (18 - 18)  SpO2: 95% (09-30-21 @ 04:34) (95% - 99%)  Wt(kg): --  I&O's Summary    29 Sep 2021 07:01  -  30 Sep 2021 07:00  --------------------------------------------------------  IN: 480 mL / OUT: 1450 mL / NET: -970 mL        Appearance: Normal		  Cardiovascular: Normal S1 S2, No JVD, No murmurs  Respiratory: Lungs clear to auscultation	  Psychiatry: A & O x 3, Mood & affect appropriate  Gastrointestinal:  Soft, Non-tender	  Neurologic: Non-focal  Extremities: No BLE edema      LABS:	 	    CBC Full  -  ( 30 Sep 2021 05:13 )  WBC Count : 5.02 K/uL  Hemoglobin : 8.7 g/dL  Hematocrit : 28.4 %  Platelet Count - Automated : 278 K/uL  Mean Cell Volume : 105.6 fl  Mean Cell Hemoglobin : 32.3 pg  Mean Cell Hemoglobin Concentration : 30.6 gm/dL      09-30    137  |  100  |  14  ----------------------------<  131<H>  4.2   |  26  |  0.91  09-29    136  |  101  |  14  ----------------------------<  92  4.6   |  25  |  0.75    Ca    9.1      30 Sep 2021 05:13  Ca    9.2      29 Sep 2021 07:03  Phos  4.3     09-30  Phos  4.5     09-29  Mg     1.8     09-30  Mg     1.9     09-29      TELEMETRY: SR 80-90s. 4 beats WCT, 3 seconds of paroxysmal AT. Pt asymptomatic.  ECG:  NSR 73bpm, LBBB  RADIOLOGY: < from: Xray Chest 2 Views PA/Lat (09.29.21 @ 12:37) >  FINDINGS:  The cardiac silhouette is normal in size. Status post aortic valve replacement. The lungs are clear. No pleural effusion or pneumothorax. Hardware noted in the thoracic spine. Right chest Chemo-Port with catheter within the SVC. No osseous abnormality.    IMPRESSION:  Clear lungs.    < end of copied text >  PREVIOUS DIAGNOSTIC TESTING:    [ ] Echocardiogram: < from: Transesophageal Echocardiogram w/o TTE (09.27.21 @ 09:25) >  Conclusions:  Moderate left ventricular enlargement. Estimated ejection  fraction 30-35%.  Moderate mitral stenosis due to annular andleaflet  calcification. Mild-moderate mitral regurgitation.  s/p transcatheter aortic valve replacement. Leaflet motion  is normal. No aortic valvular or paravalvular regurgitation  seen.  A device wire is noted in the right heart.  No vegetations seen.  < end of copied text >   24H hour events: Pt doing well overnight. Tele: sinus rhythm 80-90s. 4 beats WCT, 3 second paroxysmal AT overnight. Pt denies palpitations, SOB, chest pain. Plan for Mediport removal today.    MEDICATIONS:  furosemide    Tablet 20 milliGRAM(s) Oral daily  heparin   Injectable 8000 Unit(s) IV Push every 6 hours PRN  heparin   Injectable 4000 Unit(s) IV Push every 6 hours PRN  sacubitril 49 mG/valsartan 51 mG 1 Tablet(s) Oral two times a day  sotalol 40 milliGRAM(s) Oral every 12 hours  tamsulosin 0.4 milliGRAM(s) Oral at bedtime  ceFAZolin   IVPB 2000 milliGRAM(s) IV Intermittent every 8 hours  acetaminophen   Tablet .. 650 milliGRAM(s) Oral every 4 hours PRN  ALPRAZolam 0.25 milliGRAM(s) Oral three times a day PRN  DULoxetine 60 milliGRAM(s) Oral daily  gabapentin 300 milliGRAM(s) Oral two times a day  melatonin 6 milliGRAM(s) Oral at bedtime  oxycodone    5 mG/acetaminophen 325 mG 1 Tablet(s) Oral every 4 hours PRN  oxycodone    5 mG/acetaminophen 325 mG 2 Tablet(s) Oral every 4 hours PRN  pancrelipase  (CREON 36,000 Lipase Units) 2 Capsule(s) Oral three times a day with meals  pantoprazole    Tablet 40 milliGRAM(s) Oral before breakfast  chlorhexidine 4% Liquid 1 Application(s) Topical <User Schedule>  dorzolamide 2%/timolol 0.5% Ophthalmic Solution 1 Drop(s) Both EYES two times a day  influenza   Vaccine 0.5 milliLiter(s) IntraMuscular once  ketorolac 0.5% Ophthalmic Solution 1 Drop(s) Both EYES daily  latanoprost 0.005% Ophthalmic Solution 1 Drop(s) Both EYES at bedtime  multivitamin 1 Tablet(s) Oral daily  pyridoxine 100 milliGRAM(s) Oral daily      REVIEW OF SYSTEMS:  Complete 10point ROS negative.    PHYSICAL EXAM:  T(C): 36.4 (09-30-21 @ 04:34), Max: 36.8 (09-30-21 @ 03:30)  HR: 81 (09-30-21 @ 06:30) (76 - 92)  BP: 117/72 (09-30-21 @ 06:30) (100/64 - 118/70)  RR: 18 (09-30-21 @ 04:34) (18 - 18)  SpO2: 95% (09-30-21 @ 04:34) (95% - 99%)  Wt(kg): --  I&O's Summary    29 Sep 2021 07:01  -  30 Sep 2021 07:00  --------------------------------------------------------  IN: 480 mL / OUT: 1450 mL / NET: -970 mL        Appearance: Normal		  Cardiovascular: Normal S1 S2, No JVD, No murmurs  Respiratory: Lungs clear to auscultation	  Psychiatry: A & O x 3, Mood & affect appropriate  Gastrointestinal:  Soft, Non-tender	  Skin: Left upper chest: dressing in place; C/D/I. Nontender to palpation  Neurologic: Non-focal  Extremities: No BLE edema      LABS:	 	    CBC Full  -  ( 30 Sep 2021 05:13 )  WBC Count : 5.02 K/uL  Hemoglobin : 8.7 g/dL  Hematocrit : 28.4 %  Platelet Count - Automated : 278 K/uL  Mean Cell Volume : 105.6 fl  Mean Cell Hemoglobin : 32.3 pg  Mean Cell Hemoglobin Concentration : 30.6 gm/dL      09-30    137  |  100  |  14  ----------------------------<  131<H>  4.2   |  26  |  0.91  09-29    136  |  101  |  14  ----------------------------<  92  4.6   |  25  |  0.75    Ca    9.1      30 Sep 2021 05:13  Ca    9.2      29 Sep 2021 07:03  Phos  4.3     09-30  Phos  4.5     09-29  Mg     1.8     09-30  Mg     1.9     09-29      TELEMETRY: SR 80-90s. 4 beats WCT, 3 seconds of paroxysmal AT. Pt asymptomatic.  ECG:  NSR 73bpm, LBBB  RADIOLOGY: < from: Xray Chest 2 Views PA/Lat (09.29.21 @ 12:37) >  FINDINGS:  The cardiac silhouette is normal in size. Status post aortic valve replacement. The lungs are clear. No pleural effusion or pneumothorax. Hardware noted in the thoracic spine. Right chest Chemo-Port with catheter within the SVC. No osseous abnormality.    IMPRESSION:  Clear lungs.    < end of copied text >  PREVIOUS DIAGNOSTIC TESTING:    [ ] Echocardiogram: < from: Transesophageal Echocardiogram w/o TTE (09.27.21 @ 09:25) >  Conclusions:  Moderate left ventricular enlargement. Estimated ejection  fraction 30-35%.  Moderate mitral stenosis due to annular andleaflet  calcification. Mild-moderate mitral regurgitation.  s/p transcatheter aortic valve replacement. Leaflet motion  is normal. No aortic valvular or paravalvular regurgitation  seen.  A device wire is noted in the right heart.  No vegetations seen.  < end of copied text >

## 2021-09-30 NOTE — PROGRESS NOTE ADULT - ATTENDING COMMENTS
above plans discussed with Dr. Saenz    # MSSA bacteremia  # RLE cellulitis/OM of hallux  # concern for ICD infection  # chronic HFrEF  # PE/DVT on xarelto    - CORBIN with no clear vegetation but given high risk s/p ICD extraction  - plan for mediport removal today  - continue IV ancef via PICC line (will not need total 4 week course); ID consulted - appreciate ID recs on safe time for new ICD placement  - appreciate EP recs: plan for subcutaneous ICD next week  - IR consulted for removal of mediport; no plan for chemoRx for now  - no signs of volume overload at this time; continue home dose lasix 20mg daily  - continue IV heparin while waiting for procedure  - PT consult: previously recommended for TRINA Feng MD  Division of Hospital Medicine  Cell: [phone]  Office: [phone]

## 2021-09-30 NOTE — PROGRESS NOTE ADULT - PROBLEM SELECTOR PLAN 5
- ICD placed >20 years ago, pt had a very fast HR in the setting of a viral infection, but unclear exactly what arrhythmia  - Denied cardiac arrest, suspect sustained VT with pulse and had ICD placement  - Continue sotalol BID  -ICD interrogated at OSH, no shocks recorded, ICD functioning battery life 2.5 years; Vpaced <1%  - ICD extracted as above.   -Pt with apparent AICD fired x 1 overnight 9/25, pt asymptomatic, denied chest pain other than feeling shock. Will monitor. Currently sinus with minimal v-pacing on tele.   -Per ID pt cleared for subcutaneous ICD placement once >2 weeks from abx clearance. Discussed with EP. Plan to place new subcutaneous ICD early next week. Likely anemia of chronic disease in setting of pancreatic cancer  - H/h stable  - Currently hemodynamically stable, monitor for signs and symptoms of active bleeding especially while on therapeutic AC  - Transfuse for hemoglobin <8  -Maintain active type and screen

## 2021-09-30 NOTE — PROGRESS NOTE ADULT - PROBLEM SELECTOR PLAN 8
- Patient with complaints of coughing/choking on food on 9/18  - S/S consulted at OSH, recs dysphagia 3 soft-thin liquids. Reconsulted at [facility] and recommended pt be restarted on normal diet.  -Aspiration precautions

## 2021-09-30 NOTE — PRE PROCEDURE NOTE - PRE PROCEDURE EVALUATION
Interventional Radiology    HPI: 80y Male with hx of pancreatic CA with bacteriemia referred to IR for chest port removal.    NPO: NPO past midnight.    Allergies:   Medications (Abx/Cardiac/Anticoagulation/Blood Products)  ceFAZolin   IVPB: 100 mL/Hr IV Intermittent ( @ 05:00)  furosemide    Tablet: 20 milliGRAM(s) Oral ( @ 05:00)  heparin  Infusion.: 900 Unit(s)/Hr IV Continuous ( @ 06:09)  sacubitril 49 mG/valsartan 51 m Tablet(s) Oral ( @ 06:35)  sotalol: 40 milliGRAM(s) Oral ( @ 05:01)  tamsulosin: 0.4 milliGRAM(s) Oral ( @ 21:37)    Data:    T(C): 36.8  HR: 76  BP: 103/65  RR: 18  SpO2: 94%    Bacteremia    Family history of stroke (Mother)    Handoff    High Risk    MEWS Score    HTN (hypertension)    HLD (hyperlipidemia)    GERD (gastroesophageal reflux disease)    Cardiomyopathy    Acute osteomyelitis    Chronic systolic congestive heart failure    Pancreatic cancer    Prophylactic measure    History of pulmonary embolism    Discharge planning issues    Anemia    Coronary artery disease    Dysphagia    History of cardiac arrhythmia    History of total hip replacement    History of laminectomy    ICD (implantable cardiac defibrillator) in place    Benign testicular tumor    History of hip replacement    Status post amputation of toe of right foot    BACTEREMIA    [id number]        Exam  General: No acute distress  Chest: Non labored breathing    -WBC 5.02 / HgB 8.7 / Hct 28.4 / Plt 278  -Na 137 / Cl 100 / BUN 14 / Glucose 131  -K 4.2 / CO2 26 / Cr 0.91  -ALT -- / Alk Phos -- / T.Bili --  -INR1.10    Imaging:     Plan: 80y Male presents for chest port removal.  -Risks/Benefits/alternatives explained with the patient and witnessed informed consent obtained.

## 2021-09-30 NOTE — PROGRESS NOTE ADULT - ASSESSMENT
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    Cellulitis of right lower leg and Acute OM of right hallux  - s/p right 1st partial ray resection.  Tolerated procedure well with no obvious cardiac complications  - MSSA bacteremia on admission BCx; repeat BCx from 9/16 are NGTD  - s/p CORBIN in the setting of TAVR and ICD. no vegetations, ef 30-35  - s/p icd extraction and tolerated procedure well  - decision on timing of replacement of the explanted device tbd (subq icd versus dc with vest)  - Abx per ID.  Follow recs  - plan for permacath removal today    Pulmonary embolism.   - Bilateral PE in 7/2020, on Xarelto   - cont heparin gtt for now    Chronic systolic HF S/P ICD, MS    - moderate lv dysfxn and moderate ms based on annular calcification  - Continue Entresto   - cont lasix for now   - No signs of acute volume overload. Tolerating Room air  - Strict I/Os, daily weights  - Monitor and replete Lytes. Keep K > 4 and Mg > 2  - on sotalol for vt in the past    HTN  - continue sotalol with hold parameters  - continue entresto with hold parameters  - if bp remains soft hold lasix temporarily     - Monitor and replete lytes, keep K>4, Mg>2.  - All other medical needs as per primary team.  - Other cardiovascular workup will depend on clinical course.  - Will continue to follow.

## 2021-10-01 LAB
ANION GAP SERPL CALC-SCNC: 10 MMOL/L — SIGNIFICANT CHANGE UP (ref 5–17)
APTT BLD: 134.4 SEC — CRITICAL HIGH (ref 27.5–35.5)
APTT BLD: 51.4 SEC — HIGH (ref 27.5–35.5)
BUN SERPL-MCNC: 16 MG/DL — SIGNIFICANT CHANGE UP (ref 7–23)
CALCIUM SERPL-MCNC: 9.1 MG/DL — SIGNIFICANT CHANGE UP (ref 8.4–10.5)
CHLORIDE SERPL-SCNC: 99 MMOL/L — SIGNIFICANT CHANGE UP (ref 96–108)
CO2 SERPL-SCNC: 26 MMOL/L — SIGNIFICANT CHANGE UP (ref 22–31)
CREAT SERPL-MCNC: 0.81 MG/DL — SIGNIFICANT CHANGE UP (ref 0.5–1.3)
GLUCOSE SERPL-MCNC: 105 MG/DL — HIGH (ref 70–99)
HCT VFR BLD CALC: 30.1 % — LOW (ref 39–50)
HGB BLD-MCNC: 9.3 G/DL — LOW (ref 13–17)
MAGNESIUM SERPL-MCNC: 1.8 MG/DL — SIGNIFICANT CHANGE UP (ref 1.6–2.6)
MCHC RBC-ENTMCNC: 30.9 GM/DL — LOW (ref 32–36)
MCHC RBC-ENTMCNC: 32.9 PG — SIGNIFICANT CHANGE UP (ref 27–34)
MCV RBC AUTO: 106.4 FL — HIGH (ref 80–100)
NRBC # BLD: 0 /100 WBCS — SIGNIFICANT CHANGE UP (ref 0–0)
PHOSPHATE SERPL-MCNC: 4.6 MG/DL — HIGH (ref 2.5–4.5)
PLATELET # BLD AUTO: 281 K/UL — SIGNIFICANT CHANGE UP (ref 150–400)
POTASSIUM SERPL-MCNC: 4.2 MMOL/L — SIGNIFICANT CHANGE UP (ref 3.5–5.3)
POTASSIUM SERPL-SCNC: 4.2 MMOL/L — SIGNIFICANT CHANGE UP (ref 3.5–5.3)
RBC # BLD: 2.83 M/UL — LOW (ref 4.2–5.8)
RBC # FLD: 15.4 % — HIGH (ref 10.3–14.5)
SODIUM SERPL-SCNC: 135 MMOL/L — SIGNIFICANT CHANGE UP (ref 135–145)
WBC # BLD: 6.17 K/UL — SIGNIFICANT CHANGE UP (ref 3.8–10.5)
WBC # FLD AUTO: 6.17 K/UL — SIGNIFICANT CHANGE UP (ref 3.8–10.5)

## 2021-10-01 PROCEDURE — 99233 SBSQ HOSP IP/OBS HIGH 50: CPT | Mod: 24

## 2021-10-01 PROCEDURE — 99232 SBSQ HOSP IP/OBS MODERATE 35: CPT

## 2021-10-01 PROCEDURE — 99233 SBSQ HOSP IP/OBS HIGH 50: CPT | Mod: GC

## 2021-10-01 RX ORDER — CHLORHEXIDINE GLUCONATE 213 G/1000ML
1 SOLUTION TOPICAL
Refills: 0 | Status: COMPLETED | OUTPATIENT
Start: 2021-10-03 | End: 2021-10-03

## 2021-10-01 RX ORDER — MAGNESIUM SULFATE 500 MG/ML
1 VIAL (ML) INJECTION ONCE
Refills: 0 | Status: COMPLETED | OUTPATIENT
Start: 2021-10-01 | End: 2021-10-01

## 2021-10-01 RX ORDER — PROCHLORPERAZINE MALEATE 5 MG
10 TABLET ORAL ONCE
Refills: 0 | Status: COMPLETED | OUTPATIENT
Start: 2021-10-01 | End: 2021-10-01

## 2021-10-01 RX ORDER — RIVAROXABAN 15 MG-20MG
20 KIT ORAL
Refills: 0 | Status: COMPLETED | OUTPATIENT
Start: 2021-10-01 | End: 2021-10-02

## 2021-10-01 RX ADMIN — Medication 100 MILLIGRAM(S): at 11:18

## 2021-10-01 RX ADMIN — Medication 2 CAPSULE(S): at 17:36

## 2021-10-01 RX ADMIN — CHLORHEXIDINE GLUCONATE 1 APPLICATION(S): 213 SOLUTION TOPICAL at 05:41

## 2021-10-01 RX ADMIN — Medication 40 MILLIGRAM(S): at 07:35

## 2021-10-01 RX ADMIN — Medication 20 MILLIGRAM(S): at 05:41

## 2021-10-01 RX ADMIN — Medication 40 MILLIGRAM(S): at 17:37

## 2021-10-01 RX ADMIN — HEPARIN SODIUM 0 UNIT(S)/HR: 5000 INJECTION INTRAVENOUS; SUBCUTANEOUS at 03:07

## 2021-10-01 RX ADMIN — DORZOLAMIDE HYDROCHLORIDE TIMOLOL MALEATE 1 DROP(S): 20; 5 SOLUTION/ DROPS OPHTHALMIC at 17:36

## 2021-10-01 RX ADMIN — OXYCODONE AND ACETAMINOPHEN 2 TABLET(S): 5; 325 TABLET ORAL at 22:20

## 2021-10-01 RX ADMIN — LATANOPROST 1 DROP(S): 0.05 SOLUTION/ DROPS OPHTHALMIC; TOPICAL at 21:35

## 2021-10-01 RX ADMIN — DORZOLAMIDE HYDROCHLORIDE TIMOLOL MALEATE 1 DROP(S): 20; 5 SOLUTION/ DROPS OPHTHALMIC at 05:41

## 2021-10-01 RX ADMIN — HEPARIN SODIUM 800 UNIT(S)/HR: 5000 INJECTION INTRAVENOUS; SUBCUTANEOUS at 11:17

## 2021-10-01 RX ADMIN — Medication 10 MILLIGRAM(S): at 13:52

## 2021-10-01 RX ADMIN — GABAPENTIN 300 MILLIGRAM(S): 400 CAPSULE ORAL at 17:37

## 2021-10-01 RX ADMIN — Medication 6 MILLIGRAM(S): at 21:35

## 2021-10-01 RX ADMIN — TAMSULOSIN HYDROCHLORIDE 0.4 MILLIGRAM(S): 0.4 CAPSULE ORAL at 21:35

## 2021-10-01 RX ADMIN — HEPARIN SODIUM 600 UNIT(S)/HR: 5000 INJECTION INTRAVENOUS; SUBCUTANEOUS at 04:14

## 2021-10-01 RX ADMIN — RIVAROXABAN 20 MILLIGRAM(S): KIT at 17:39

## 2021-10-01 RX ADMIN — HEPARIN SODIUM 4000 UNIT(S): 5000 INJECTION INTRAVENOUS; SUBCUTANEOUS at 11:19

## 2021-10-01 RX ADMIN — Medication 100 MILLIGRAM(S): at 13:51

## 2021-10-01 RX ADMIN — Medication 0.25 MILLIGRAM(S): at 23:03

## 2021-10-01 RX ADMIN — Medication 1 DROP(S): at 11:39

## 2021-10-01 RX ADMIN — OXYCODONE AND ACETAMINOPHEN 2 TABLET(S): 5; 325 TABLET ORAL at 21:44

## 2021-10-01 RX ADMIN — OXYCODONE AND ACETAMINOPHEN 2 TABLET(S): 5; 325 TABLET ORAL at 09:00

## 2021-10-01 RX ADMIN — Medication 2 CAPSULE(S): at 11:39

## 2021-10-01 RX ADMIN — OXYCODONE AND ACETAMINOPHEN 1 TABLET(S): 5; 325 TABLET ORAL at 02:38

## 2021-10-01 RX ADMIN — SACUBITRIL AND VALSARTAN 1 TABLET(S): 24; 26 TABLET, FILM COATED ORAL at 05:42

## 2021-10-01 RX ADMIN — Medication 100 MILLIGRAM(S): at 05:41

## 2021-10-01 RX ADMIN — OXYCODONE AND ACETAMINOPHEN 1 TABLET(S): 5; 325 TABLET ORAL at 02:08

## 2021-10-01 RX ADMIN — PANTOPRAZOLE SODIUM 40 MILLIGRAM(S): 20 TABLET, DELAYED RELEASE ORAL at 07:37

## 2021-10-01 RX ADMIN — Medication 1 TABLET(S): at 11:42

## 2021-10-01 RX ADMIN — Medication 100 MILLIGRAM(S): at 21:36

## 2021-10-01 RX ADMIN — GABAPENTIN 300 MILLIGRAM(S): 400 CAPSULE ORAL at 05:41

## 2021-10-01 RX ADMIN — DULOXETINE HYDROCHLORIDE 60 MILLIGRAM(S): 30 CAPSULE, DELAYED RELEASE ORAL at 11:39

## 2021-10-01 RX ADMIN — Medication 100 GRAM(S): at 08:51

## 2021-10-01 RX ADMIN — OXYCODONE AND ACETAMINOPHEN 2 TABLET(S): 5; 325 TABLET ORAL at 08:25

## 2021-10-01 RX ADMIN — Medication 2 CAPSULE(S): at 08:15

## 2021-10-01 NOTE — PROGRESS NOTE ADULT - PROBLEM SELECTOR PLAN 7
- Diagnosed in March 2021, on chemotherapy (does not recall name of medication)  - Continue home Pancrelipase 36,000 2 tabs PO TID with meals  - Per podiatry, Dr. López spoke with Dr. Murphy at [facility] to be postponed until after resolution of acute OM infection, but does not need to wait until full course abx completed per ID.   -Pain meds as above

## 2021-10-01 NOTE — PROGRESS NOTE ADULT - PROBLEM SELECTOR PLAN 8
- Patient with complaints of coughing/choking on food on 9/18  - S/S consulted at OSH, recs dysphagia 3 soft-thin liquids. Reconsulted at [facility] and recommended pt be restarted on normal diet.  -Aspiration precautions

## 2021-10-01 NOTE — PROGRESS NOTE ADULT - ASSESSMENT
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    Cellulitis of right lower leg and Acute OM of right hallux  - s/p right 1st partial ray resection.  Tolerated procedure well with no obvious cardiac complications  - MSSA bacteremia on admission BCx; repeat BCx from 9/16 are NGTD  - s/p CORBIN in the setting of TAVR and ICD. no vegetations, ef 30-35  - s/p icd extraction and tolerated procedure well  - plan for subq icd next week.   - Abx per ID.  Follow recs      Pulmonary embolism.   - Bilateral PE in 7/2020, on Xarelto   - cont heparin gtt for now    Chronic systolic HF S/P ICD, MS    - moderate lv dysfxn and moderate ms based on annular calcification  - Continue Entresto   - bp at time soft. would hold lasix for few days.   - No signs of acute volume overload. Tolerating Room air  - Strict I/Os, daily weights  - Monitor and replete Lytes. Keep K > 4 and Mg > 2  - on sotalol for vt in the past    HTN  - continue sotalol with hold parameters  - continue entresto with hold parameters  - if bp remains soft hold lasix temporarily     - Monitor and replete lytes, keep K>4, Mg>2.  - All other medical needs as per primary team.  - Other cardiovascular workup will depend on clinical course.  - Will continue to follow.

## 2021-10-01 NOTE — PROGRESS NOTE ADULT - PROBLEM SELECTOR PLAN 2
- ICD placed >20 years ago, 2/2 sustained VT  - Denied cardiac arrest, suspect sustained VT with pulse and had ICD placement  - Continue sotalol BID  -ICD interrogated at OSH, no shocks recorded, ICD functioning battery life 2.5 years; Vpaced <1%  - ICD extracted as above.   -Pt with apparent AICD fired x 1 overnight 9/25, pt asymptomatic, denied chest pain other than feeling shock. Will monitor. Currently sinus with minimal v-pacing on tele.   -Per ID pt cleared for subcutaneous ICD placement once >2 weeks from abx clearance. Discussed with EP. Plan to place new subcutaneous ICD early next week, likely monday. - ICD placed >20 years ago, 2/2 sustained VT  - Denied cardiac arrest, suspect sustained VT with pulse and had ICD placement  - Continue sotalol BID  -ICD interrogated at OSH, no shocks recorded, ICD functioning battery life 2.5 years; Vpaced <1%  - ICD extracted as above.   -Pt with apparent AICD fired x 1 overnight 9/25, pt asymptomatic, denied chest pain other than feeling shock. Will monitor. Currently sinus with minimal v-pacing on tele.   -Per ID pt cleared for subcutaneous ICD placement once >2 weeks from abx clearance. Discussed with EP. Plan to place new subcutaneous ICD on Monday

## 2021-10-01 NOTE — PROGRESS NOTE ADULT - ASSESSMENT
80M PMH of pancreatic cancer (3/2021), HTN, HLD, CHF, CAD s/p stents, ICD, TAVR, b/l PE, spinal stenosis s/p dylon placement, GERD, BPH, L. hip replacement x3, presenting with R. hallux OM s/p amputation.     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   s/p R hallux amputation on 9/20  Operative Cultures with CoNS and Pseudomonas but pathology clear  Unclear if these cultures are dirty bone or clean bone  However, in light of negative path and stable exam would not cover for the Pseudomonas for now    Concerning context for MSSA Bacteremia given mediport, AICD and TAVR  CORBIN without vegetations  s/p AICD removal 9/28  s/p mediport removal 9/30    Plan to treat for minimum of 4 weeks with Cefazolin from negative blood cultures (9/16 -->)    Overall, MSSA Bacteremia, Toe OM, Positive Culture Finding (Pseudomonas), AICD in place, Mediport in place, s/p TAVR    -Continue Cefazolin 2g IV Q8H  -Given cleared repeat BCx, negative CORBIN and stable clinical status would have no absolute ID contraindication for replacing AICD within 72H from extraction (Friday)  -Given cleared repeat BCx, negative CORBIN and stable clinical status would have no absolute ID contraindication for proceeding with Whipple procedure (called and discussed case with Surgical Oncologist Dr. Shon Murphy)  -Continue to follow CBC with diff  -Continue to follow temperature curve    I will continue to follow. Please feel free to contact me with any further questions.    Sanjay Andres M.D.  [facility] Division of Infectious Disease  8AM-5PM: Pager Number [phone]  After Hours (or if no response): Please contact the Infectious Diseases Office at [phone]

## 2021-10-01 NOTE — PROVIDER CONTACT NOTE (CRITICAL VALUE NOTIFICATION) - BACKGROUND
Pt was admitted with bacteremia. Pt has a PMH of pancreatic cancer, TAVR, bilateral PE.
Dx: Bacteremia, Hx: HLD, GERD, HTN, cardiomypoathy
Dx: bacteremia, Hx: pancreatic cancer, CAD, anemia, PE, CHF
Pt was admitted with bacteremia. Pt has a PMH of pancreatic cancer, TAVR, bilateral PE.

## 2021-10-01 NOTE — PROGRESS NOTE ADULT - PROBLEM SELECTOR PLAN 1
With RLE cellulitis  - Initial blood cultures 9/15 positive for MSSA bacteremia  - Repeat BCx from 9/16 NGTD  - S/p R. hallux amputation 9/20  Surgical pathology of right hallux positive for acute osteomyelitis, MSSA   - Continue cefazolin 2g q8h per ID recs at [place]. Plan is to continue 4-6 weeks abx depending on result of CORBIN (as late as 10/27)  - S/P PICC line placement 9/22 RUE  - Pain well controlled w/ current regimen   - S/p RRT 9/23 for ruptured hematoma @ surgical site during PT, wound irrigated and packed, left open to heal by secondary intention  - Outpatient podiatrist dr Felipe. Podiatry reconsulted. Will f/u recs.   - Cardiology/EP following. CORBIN performed 9/27 without evidence of vegetations.   -ICD extraction performed 9/28 without complications.   -Id following. Will appreciate further recs.   -S/P mediport removal 9/30

## 2021-10-01 NOTE — PROGRESS NOTE ADULT - ASSESSMENT
Mr. Gatica is a 80 year old male with PMH pancreatic cancer (dx 3/2021, currently undergoing chemo), HTN, HLD, CHF (unknown EF), CAD s/p stents x2 (~15 years ago), defibrillator, TAVR (4/2021), bilateral PE (7/2021) on Xarelto, spinal stenosis, GERD, BPH, macular degeneration initially presented to [facility] c/o R foot pain admitted for R hallux pressure injury and RLE cellulitis found to have MSSA bacteremia and acute OM of right hallux. Given recent TAVR, transferred [facility] for CORBIN and consideration of ICD extraction.

## 2021-10-01 NOTE — PROGRESS NOTE ADULT - SUBJECTIVE AND OBJECTIVE BOX
24H hour events: Pt c/o pain at LCW extraction site, relived with percocet. No acute events overnight. Tele: SR at 80-60's, PAT x 3.4 sec with 's     MEDICATIONS:  furosemide    Tablet 20 milliGRAM(s) Oral daily  rivaroxaban 20 milliGRAM(s) Oral with dinner  sacubitril 49 mG/valsartan 51 mG 1 Tablet(s) Oral two times a day  sotalol 40 milliGRAM(s) Oral every 12 hours  tamsulosin 0.4 milliGRAM(s) Oral at bedtime  ceFAZolin   IVPB 2000 milliGRAM(s) IV Intermittent every 8 hours  acetaminophen   Tablet .. 650 milliGRAM(s) Oral every 4 hours PRN  ALPRAZolam 0.25 milliGRAM(s) Oral three times a day PRN  DULoxetine 60 milliGRAM(s) Oral daily  gabapentin 300 milliGRAM(s) Oral two times a day  melatonin 6 milliGRAM(s) Oral at bedtime  oxycodone    5 mG/acetaminophen 325 mG 2 Tablet(s) Oral every 4 hours PRN  oxycodone    5 mG/acetaminophen 325 mG 1 Tablet(s) Oral every 4 hours PRN  pancrelipase  (CREON 36,000 Lipase Units) 2 Capsule(s) Oral three times a day with meals  pantoprazole    Tablet 40 milliGRAM(s) Oral before breakfast  chlorhexidine 4% Liquid 1 Application(s) Topical <User Schedule>  dorzolamide 2%/timolol 0.5% Ophthalmic Solution 1 Drop(s) Both EYES two times a day  influenza   Vaccine 0.5 milliLiter(s) IntraMuscular once  ketorolac 0.5% Ophthalmic Solution 1 Drop(s) Both EYES daily  latanoprost 0.005% Ophthalmic Solution 1 Drop(s) Both EYES at bedtime  multivitamin 1 Tablet(s) Oral daily  pyridoxine 100 milliGRAM(s) Oral daily      REVIEW OF SYSTEMS:  Complete 10point ROS negative.    PHYSICAL EXAM:  T(C): 37 (10-01-21 @ 11:07), Max: 37 (10-01-21 @ 11:07)  HR: 80 (10-01-21 @ 11:07) (76 - 92)  BP: 102/64 (10-01-21 @ 11:07) (84/48 - 102/64)  RR: 18 (10-01-21 @ 11:07) (10 - 18)  SpO2: 94% (10-01-21 @ 11:07) (94% - 98%)  Wt(kg): --  I&O's Summary    30 Sep 2021 07:01  -  01 Oct 2021 07:00  --------------------------------------------------------  IN: 0 mL / OUT: 2200 mL / NET: -2200 mL    01 Oct 2021 07:01  -  01 Oct 2021 15:10  --------------------------------------------------------  IN: 480 mL / OUT: 450 mL / NET: 30 mL      Appearance: NAD	  HEENT: Neck supple   Cardiovascular: Normal S1 S2, No JVD, No murmurs  Respiratory: Lungs clear to auscultation	  Psychiatry: A & O x 3, Mood & affect appropriate  Gastrointestinal:  Soft, Non-tender, + BS	  Skin: Left chest wall extraction site C/D/I without hematoma. Right arm PICC line site C/D/I   Extremities: No edema. Right groin soft soft and non-tender. Right foot wrapped in ACE bandage          LABS:	 	    CBC Full  -  ( 01 Oct 2021 01:55 )  WBC Count : 6.17 K/uL  Hemoglobin : 9.3 g/dL  Hematocrit : 30.1 %  Platelet Count - Automated : 281 K/uL  Mean Cell Volume : 106.4 fl  Mean Cell Hemoglobin : 32.9 pg  Mean Cell Hemoglobin Concentration : 30.9 gm/dL      10-01    135  |  99  |  16  ----------------------------<  105<H>  4.2   |  26  |  0.81  09-30    137  |  100  |  14  ----------------------------<  131<H>  4.2   |  26  |  0.91    Ca    9.1      01 Oct 2021 05:40  Ca    9.1      30 Sep 2021 05:13  Phos  4.6     10-01  Phos  4.3     09-30  Mg     1.8     10-01  Mg     1.8     09-30      TELEMETRY: SR at 80-60's, PAT x 3.4 sec with 's  	      < from: Transesophageal Echocardiogram w/o TTE (09.27.21 @ 09:25) >  Dimensions:    Normal Values:  LA:            2.0 - 4.0 cm  Ao:            2.0 - 3.8 cm  SEPTUM:        0.6 - 1.2 cm  PWT:           0.6 - 1.1 cm  LVIDd:         3.0 - 5.6 cm  LVIDs:         1.8 - 4.0 cm  EF (Visual Estimate): 30-35 %  Doppler Peak Velocity (m/sec): AoV=1.9 TV=2.7  ------------------------------------------------------------------------  Observations:  Mitral Valve: Severe mitral annular and leaflet  calcification.  Moderate mitral stenosis. Mean gradient 5.8 mmHg at  68/min.  Mild-moderate mitral regurgitation.  Aortic Valve/Aorta: s/p transcatheter aortic valve  replacement. Leaflet motion is normal.  No aortic valvular or paravalvular regurgitation seen.  Normal size aortic root, arch, and descending thoracic  aorta.  Mild atherosclerosis. No mobile plaque.  Left Atrium: Severe left atrial enlargement.  Left Ventricle: Moderate left ventricular enlargement.  Estimated ejection fraction 30-35%.  Right Heart: Moderate right atrial enlargement. Prominent  Chiari network. Normal right ventricular size and function.    A device wire is noted in the right heart.  Normal tricuspid valve. Mild tricuspid regurgitation.  Normal pulmonic valve. Minimal pulmonic regurgitation.  Pericardium/Pleura: Normal pericardium with no pericardial  effusion.  Hemodynamic: Mild pulmonary hypertension.  Color Doppler doemonstratesthe presence of a patent  foramen ovale.  ------------------------------------------------------------------------  Conclusions:Moderate left ventricular enlargement. Estimated ejection  fraction 30-35%.  Moderate mitral stenosis due to annular andleaflet  calcification. Mild-moderate mitral regurgitation.  s/p transcatheter aortic valve replacement. Leaflet motion  is normal. No aortic valvular or paravalvular regurgitation  seen.  A device wire is noted in the right heart.  No vegetations seen.    < end of copied text >

## 2021-10-01 NOTE — PROGRESS NOTE ADULT - SUBJECTIVE AND OBJECTIVE BOX
Follow Up:  Bacteremia    Interval History: afebrile. no acute overnight events. s/p mediport removal 9/30.     REVIEW OF SYSTEMS  [  ] ROS unobtainable because:    [ x ] All other systems negative except as noted below    Constitutional:  [ ] fever [ ] chills  [ ] weight loss  [ ] weakness  Skin:  [ ] rash [ ] phlebitis	  Eyes: [ ] icterus [ ] pain  [ ] discharge	  ENMT: [ ] sore throat  [ ] thrush [ ] ulcers [ ] exudates  Respiratory: [ ] dyspnea [ ] hemoptysis [ ] cough [ ] sputum	  Cardiovascular:  [ ] chest pain [ ] palpitations [ ] edema	  Gastrointestinal:  [ ] nausea [ ] vomiting [ ] diarrhea [ ] constipation [ ] pain	  Genitourinary:  [ ] dysuria [ ] frequency [ ] hematuria [ ] discharge [ ] flank pain  [ ] incontinence  Musculoskeletal:  [ ] myalgias [ ] arthralgias [ ] arthritis  [ ] back pain  Neurological:  [ ] headache [ ] seizures  [ ] confusion/altered mental status    Allergies  No Known Allergies        ANTIMICROBIALS:  ceFAZolin   IVPB 2000 every 8 hours      OTHER MEDS:  MEDICATIONS  (STANDING):  acetaminophen   Tablet .. 650 every 4 hours PRN  ALPRAZolam 0.25 three times a day PRN  DULoxetine 60 daily  furosemide    Tablet 20 daily  gabapentin 300 two times a day  influenza   Vaccine 0.5 once  melatonin 6 at bedtime  oxycodone    5 mG/acetaminophen 325 mG 2 every 4 hours PRN  oxycodone    5 mG/acetaminophen 325 mG 1 every 4 hours PRN  pancrelipase  (CREON 36,000 Lipase Units) 2 three times a day with meals  pantoprazole    Tablet 40 before breakfast  rivaroxaban 20 with dinner  sacubitril 49 mG/valsartan 51 mG 1 two times a day  sotalol 40 every 12 hours  tamsulosin 0.4 at bedtime      Vital Signs Last 24 Hrs  T(C): 37 (01 Oct 2021 11:07), Max: 37 (01 Oct 2021 11:07)  T(F): 98.6 (01 Oct 2021 11:07), Max: 98.6 (01 Oct 2021 11:07)  HR: 80 (01 Oct 2021 11:07) (76 - 92)  BP: 102/64 (01 Oct 2021 11:07) (84/48 - 102/64)  BP(mean): --  RR: 18 (01 Oct 2021 11:07) (18 - 18)  SpO2: 94% (01 Oct 2021 11:07) (94% - 98%)    PHYSICAL EXAMINATION:  General: Alert and Awake, NAD  Cardiac: RRR, No M/R/G  Resp: CTAB, No Wh/Rh/Ra  Abdomen: NBS, NT/ND, No HSM, No rigidity or guarding  MSK: +R foot with small area of dehiscence but without erythema, drainage or malodour. Rest of foot well healed and without wounds,  : No hernandez  Skin: No rashes or lesions. Skin is warm and dry to the touch.   Neuro: Alert and Awake. CN 2-12 Grossly intact. Moves all four extremities spontaneously.  Psych: Calm, Pleasant, Cooperative  Vasc: R Chest mediport extraction site, well healed.                           9.3    6.17  )-----------( 281      ( 01 Oct 2021 01:55 )             30.1       10-01    135  |  99  |  16  ----------------------------<  105<H>  4.2   |  26  |  0.81    Ca    9.1      01 Oct 2021 05:40  Phos  4.6     10-01  Mg     1.8     10-01            MICROBIOLOGY:  v  .Other icd lead tip  09-29-21 --  --  --      .Other icd lead tip  09-29-21   Testing in progress  --  --      .Surgical Swab icd lead tip  09-29-21   No growth  --  --      .Tissue Right hallux bone culutre  09-21-21   Few Pseudomonas aeruginosa  Rare Staphylococcus pettenkoferi  --  Pseudomonas aeruginosa  Staphylococcus pettenkoferi      .Tissue right first metatarsal bone cu  09-21-21   Rare Pseudomonas aeruginosa  Rare Staphylococcus pettenkoferi  --  Pseudomonas aeruginosa  Staphylococcus pettenkoferi      .Blood Blood-Peripheral  09-16-21   No Growth Final  --  --      .Tissue right hallux bone culture  09-16-21   Few Staphylococcus aureus  --  Staphylococcus aureus      Clean Catch Clean Catch (Midstream)  09-15-21   <10,000 CFU/mL Normal Urogenital Juani  --  --      .Blood Blood-Peripheral  09-15-21   Growth in aerobic bottle: Staphylococcus aureus  See previous culture [id number]  --  Blood Culture PCR  Staphylococcus aureus    RADIOLOGY:    <The imaging below has been reviewed and visualized by me independently. Findings as detailed in report below>    < from: Xray Chest 2 Views PA/Lat (09.29.21 @ 12:37) >  IMPRESSION:  Clear lungs.    < end of copied text >

## 2021-10-01 NOTE — PROVIDER CONTACT NOTE (CRITICAL VALUE NOTIFICATION) - ACTION/TREATMENT ORDERED:
Provider aware. Monogram launched. Will continue to monitor.
Provider notified. Nomogram followed, heparin gtt held for 60 min, will resume gtt after at a new rate of 6 ml/hr. Will continue to monitor.
Provider notified. Nomogram followed: help heparin gtt for 60 min, will restart at a new rate of 9 ml/hr. Will continue to monitor pt.
Provider made aware. Will continue to hold heparin. Will continue to monitor.

## 2021-10-01 NOTE — PROGRESS NOTE ADULT - ATTENDING COMMENTS
above plans discussed with Dr. Saenz    # MSSA bacteremia  # RLE cellulitis/OM of hallux  # concern for ICD infection  # chronic HFrEF  # PE/DVT on xarelto    - CORBIN with no clear vegetation but given high risk s/p ICD extraction  - s/p mediport removal as well  - continue IV ancef via PICC line (will not need total 4 week course); ID consulted - appreciate ID recs   - appreciate EP recs: plan for subcutaneous ICD placement next Monday  - IR consulted for removal of mediport; no plan for chemoRx for now  - no signs of volume overload at this time; continue home dose lasix 20mg daily  - resume on xarelto  - PT consult: previously recommended for TRINA Feng MD  Division of Hospital Medicine  Cell: [phone]  Office: [phone]

## 2021-10-01 NOTE — PROVIDER CONTACT NOTE (OTHER) - ACTION/TREATMENT ORDERED:
Provider aware, will order AM labwork, no further interventions at this time. Will continue to monitor.

## 2021-10-01 NOTE — PROGRESS NOTE ADULT - SUBJECTIVE AND OBJECTIVE BOX
PROGRESS NOTE:   Authored by Fady Saenz MD, PGY1 [facility] Pager [phone] [facility] pager [phone]    Patient is a 80y old  Male who presents with a chief complaint of osteomylitis, c/f endocarditis (30 Sep 2021 16:44)      SUBJECTIVE / OVERNIGHT EVENTS:     REVIEW OF SYSTEMS:    CONSTITUTIONAL: No weakness, fevers or chills  EYES/ENT: No visual changes;  No vertigo or throat pain   NECK: No pain or stiffness  RESPIRATORY: No cough, wheezing, hemoptysis; No shortness of breath  CARDIOVASCULAR: No chest pain or palpitations  GASTROINTESTINAL: No abdominal or epigastric pain. No nausea, vomiting, or hematemesis; No diarrhea or constipation. No melena or hematochezia.  GENITOURINARY: No dysuria, frequency or hematuria  NEUROLOGICAL: No numbness or weakness  SKIN: No itching, rashes    MEDICATIONS  (STANDING):  ceFAZolin   IVPB 2000 milliGRAM(s) IV Intermittent every 8 hours  chlorhexidine 4% Liquid 1 Application(s) Topical <User Schedule>  dorzolamide 2%/timolol 0.5% Ophthalmic Solution 1 Drop(s) Both EYES two times a day  DULoxetine 60 milliGRAM(s) Oral daily  furosemide    Tablet 20 milliGRAM(s) Oral daily  gabapentin 300 milliGRAM(s) Oral two times a day  heparin  Infusion. 900 Unit(s)/Hr (9 mL/Hr) IV Continuous <Continuous>  influenza   Vaccine 0.5 milliLiter(s) IntraMuscular once  ketorolac 0.5% Ophthalmic Solution 1 Drop(s) Both EYES daily  latanoprost 0.005% Ophthalmic Solution 1 Drop(s) Both EYES at bedtime  melatonin 6 milliGRAM(s) Oral at bedtime  multivitamin 1 Tablet(s) Oral daily  pancrelipase  (CREON 36,000 Lipase Units) 2 Capsule(s) Oral three times a day with meals  pantoprazole    Tablet 40 milliGRAM(s) Oral before breakfast  pyridoxine 100 milliGRAM(s) Oral daily  sacubitril 49 mG/valsartan 51 mG 1 Tablet(s) Oral two times a day  sotalol 40 milliGRAM(s) Oral every 12 hours  tamsulosin 0.4 milliGRAM(s) Oral at bedtime    MEDICATIONS  (PRN):  acetaminophen   Tablet .. 650 milliGRAM(s) Oral every 4 hours PRN Mild Pain (1 - 3)  ALPRAZolam 0.25 milliGRAM(s) Oral three times a day PRN Anxiety/agitation  heparin   Injectable 8000 Unit(s) IV Push every 6 hours PRN For aPTT less than 40  heparin   Injectable 4000 Unit(s) IV Push every 6 hours PRN For aPTT between 40 - 57  oxycodone    5 mG/acetaminophen 325 mG 2 Tablet(s) Oral every 4 hours PRN Severe Pain (7 - 10)  oxycodone    5 mG/acetaminophen 325 mG 1 Tablet(s) Oral every 4 hours PRN Moderate Pain (4 - 6)      CAPILLARY BLOOD GLUCOSE        I&O's Summary    30 Sep 2021 07:01  -  01 Oct 2021 07:00  --------------------------------------------------------  IN: 0 mL / OUT: 2200 mL / NET: -2200 mL        PHYSICAL EXAM:  Vital Signs Last 24 Hrs  T(C): 36.4 (01 Oct 2021 04:57), Max: 36.8 (30 Sep 2021 11:15)  T(F): 97.6 (01 Oct 2021 04:57), Max: 98.2 (30 Sep 2021 11:15)  HR: 85 (01 Oct 2021 04:57) (68 - 92)  BP: 102/62 (01 Oct 2021 04:57) (84/48 - 111/57)  BP(mean): 63 (30 Sep 2021 16:00) (63 - 77)  RR: 18 (01 Oct 2021 04:57) (0 - 18)  SpO2: 94% (01 Oct 2021 04:57) (94% - 100%)    CONSTITUTIONAL: NAD, well-developed  RESPIRATORY: Normal respiratory effort; lungs are clear to auscultation bilaterally  CARDIOVASCULAR: Regular rate and rhythm, normal S1 and S2, no murmur/rub/gallop; No lower extremity edema; Peripheral pulses are 2+ bilaterally  ABDOMEN: Nontender to palpation, normoactive bowel sounds, no rebound/guarding; No hepatosplenomegaly  MUSCLOSKELETAL: no clubbing or cyanosis of digits; no joint swelling or tenderness to palpation  PSYCH: A+O to person, place, and time; affect appropriate  NEURO: Non-focal, no tremors  SKIN: No rashes    LABS:                        9.3    6.17  )-----------( 281      ( 01 Oct 2021 01:55 )             30.1     10-01    135  |  99  |  16  ----------------------------<  105<H>  4.2   |  26  |  0.81    Ca    9.1      01 Oct 2021 05:40  Phos  4.6     10-01  Mg     1.8     10-01      PT/INR - ( 30 Sep 2021 05:13 )   PT: 13.1 sec;   INR: 1.10 ratio         PTT - ( 01 Oct 2021 01:55 )  PTT:134.4 sec          Culture - Acid Fast - Other w/Smear (collected 29 Sep 2021 01:49)  Source: .Other icd lead tip    Culture - Fungal, Other (collected 29 Sep 2021 01:48)  Source: .Other icd lead tip  Preliminary Report (29 Sep 2021 09:00):    Testing in progress    Culture - Surgical Swab (collected 29 Sep 2021 01:46)  Source: .Surgical Swab icd lead tip  Preliminary Report (29 Sep 2021 18:50):    No growth        RADIOLOGY & ADDITIONAL TESTS:  No new imaging or tests    COORDINATION OF CARE:  Care Discussed with Consultants/Other Providers [Y/N]:  Prior or Outpatient Records Reviewed [Y/N]:   PROGRESS NOTE:   Authored by Fady Saenz MD, PGY1 [facility] Pager [phone] [facility] pager [phone]    Patient is a 80y old  Male who presents with a chief complaint of osteomylitis, c/f endocarditis (30 Sep 2021 16:44)      SUBJECTIVE / OVERNIGHT EVENTS: No acute events overnight. Pt complaining of some pain left chest and pain at amputation site, but both relieved with pain meds. Otherwise feels well.     REVIEW OF SYSTEMS:    CONSTITUTIONAL: No weakness, fevers or chills  EYES/ENT: No visual changes;  No vertigo or throat pain   NECK: No pain or stiffness  RESPIRATORY: No cough, wheezing, hemoptysis; No shortness of breath  CARDIOVASCULAR: No chest pain or palpitations  GASTROINTESTINAL: No abdominal or epigastric pain. No nausea, vomiting, or hematemesis; No diarrhea or constipation. No melena or hematochezia.  GENITOURINARY: No dysuria, frequency or hematuria  NEUROLOGICAL: No numbness or weakness  SKIN: No itching, rashes    MEDICATIONS  (STANDING):  ceFAZolin   IVPB 2000 milliGRAM(s) IV Intermittent every 8 hours  chlorhexidine 4% Liquid 1 Application(s) Topical <User Schedule>  dorzolamide 2%/timolol 0.5% Ophthalmic Solution 1 Drop(s) Both EYES two times a day  DULoxetine 60 milliGRAM(s) Oral daily  furosemide    Tablet 20 milliGRAM(s) Oral daily  gabapentin 300 milliGRAM(s) Oral two times a day  heparin  Infusion. 900 Unit(s)/Hr (9 mL/Hr) IV Continuous <Continuous>  influenza   Vaccine 0.5 milliLiter(s) IntraMuscular once  ketorolac 0.5% Ophthalmic Solution 1 Drop(s) Both EYES daily  latanoprost 0.005% Ophthalmic Solution 1 Drop(s) Both EYES at bedtime  melatonin 6 milliGRAM(s) Oral at bedtime  multivitamin 1 Tablet(s) Oral daily  pancrelipase  (CREON 36,000 Lipase Units) 2 Capsule(s) Oral three times a day with meals  pantoprazole    Tablet 40 milliGRAM(s) Oral before breakfast  pyridoxine 100 milliGRAM(s) Oral daily  sacubitril 49 mG/valsartan 51 mG 1 Tablet(s) Oral two times a day  sotalol 40 milliGRAM(s) Oral every 12 hours  tamsulosin 0.4 milliGRAM(s) Oral at bedtime    MEDICATIONS  (PRN):  acetaminophen   Tablet .. 650 milliGRAM(s) Oral every 4 hours PRN Mild Pain (1 - 3)  ALPRAZolam 0.25 milliGRAM(s) Oral three times a day PRN Anxiety/agitation  heparin   Injectable 8000 Unit(s) IV Push every 6 hours PRN For aPTT less than 40  heparin   Injectable 4000 Unit(s) IV Push every 6 hours PRN For aPTT between 40 - 57  oxycodone    5 mG/acetaminophen 325 mG 2 Tablet(s) Oral every 4 hours PRN Severe Pain (7 - 10)  oxycodone    5 mG/acetaminophen 325 mG 1 Tablet(s) Oral every 4 hours PRN Moderate Pain (4 - 6)      CAPILLARY BLOOD GLUCOSE        I&O's Summary    30 Sep 2021 07:01  -  01 Oct 2021 07:00  --------------------------------------------------------  IN: 0 mL / OUT: 2200 mL / NET: -2200 mL        PHYSICAL EXAM:  Vital Signs Last 24 Hrs  T(C): 36.4 (01 Oct 2021 04:57), Max: 36.8 (30 Sep 2021 11:15)  T(F): 97.6 (01 Oct 2021 04:57), Max: 98.2 (30 Sep 2021 11:15)  HR: 85 (01 Oct 2021 04:57) (68 - 92)  BP: 102/62 (01 Oct 2021 04:57) (84/48 - 111/57)  BP(mean): 63 (30 Sep 2021 16:00) (63 - 77)  RR: 18 (01 Oct 2021 04:57) (0 - 18)  SpO2: 94% (01 Oct 2021 04:57) (94% - 100%)    CONSTITUTIONAL: NAD, well-developed  RESPIRATORY: Normal respiratory effort; lungs are clear to auscultation bilaterally  CARDIOVASCULAR: Regular rate and rhythm, normal S1 and S2, no murmur/rub/gallop; No lower extremity edema; Peripheral pulses are 2+ bilaterally  ABDOMEN: Nontender to palpation, normoactive bowel sounds, no rebound/guarding; No hepatosplenomegaly  MUSCLOSKELETAL: no clubbing or cyanosis of digits; R. Foot dressed with dressing clean and dry.   PSYCH: A+O to person, place, and time; affect appropriate  NEURO: Non-focal, no tremors  SKIN: No rashes. L chest wall TTP. No warmth, erythema, swelling, or hematoma. Surgical site Left upper chest clean and dry without bleeding  LABS:                        9.3    6.17  )-----------( 281      ( 01 Oct 2021 01:55 )             30.1     10-01    135  |  99  |  16  ----------------------------<  105<H>  4.2   |  26  |  0.81    Ca    9.1      01 Oct 2021 05:40  Phos  4.6     10-01  Mg     1.8     10-01      PT/INR - ( 30 Sep 2021 05:13 )   PT: 13.1 sec;   INR: 1.10 ratio         PTT - ( 01 Oct 2021 01:55 )  PTT:134.4 sec          Culture - Acid Fast - Other w/Smear (collected 29 Sep 2021 01:49)  Source: .Other icd lead tip    Culture - Fungal, Other (collected 29 Sep 2021 01:48)  Source: .Other icd lead tip  Preliminary Report (29 Sep 2021 09:00):    Testing in progress    Culture - Surgical Swab (collected 29 Sep 2021 01:46)  Source: .Surgical Swab icd lead tip  Preliminary Report (29 Sep 2021 18:50):    No growth        RADIOLOGY & ADDITIONAL TESTS:  No new imaging or tests    COORDINATION OF CARE:  Care Discussed with Consultants/Other Providers [Y/N]:  Prior or Outpatient Records Reviewed [Y/N]:

## 2021-10-01 NOTE — PROVIDER CONTACT NOTE (CRITICAL VALUE NOTIFICATION) - ASSESSMENT
Pt is A&O x4, no complaints of SOB, chest pain or dizziness made. No signs or symptoms of bleeding noted.
Pt is A&O x4, able to make needs known. No signs or symptoms of bleeding, no complaints made.
Pt is resting. VSS.
Heparin on hold for ICD extraction today.

## 2021-10-01 NOTE — PROGRESS NOTE ADULT - ASSESSMENT
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 Given recent TAVR, transfered [facility] for CORBIN and consideration of ICD extraction. CORBIN revealed no vegetation. Device interrogation revealed one episode of paroxysmal AT 9/26/21 at ~200bpm. Met rate for VT zone. Received ATPx4 & 1 36J shock s/p termination. No other therapies. Pt states he has never received shocks post implant. Now s/p ICD extraction on 9/28/21 and RCW port removal 9/30.     1. RLE cellulitis and acute OM of right hallux s/p Rt 1st partial ray resection.   2. Chronic systolic HF s/p single chamber Abbott ICD.   3. HTN  4. s/p TAVR 4/2021  5. Bilateral PE (7/2021)     - MSSA bacteremia on admission BCx 9/15; repeat BCx from 9/16 are NGTD, lead tip cx NGTD  - c/w abx per ID. ID clearance for re-implant appreciated   - Continue home dose Sotalol 40mg q12 hr.  - AC changed back to xarelto and hold Sunday dose  - Send T&S and COVID PCR on Sunday   - Screens in for S-ICD  - NPO after MN Sunday for S-ICD implant on Monday (10/4)   - Discussed with Team 5.    BHUMIKA River NP-C  [phone]

## 2021-10-01 NOTE — PROGRESS NOTE ADULT - SUBJECTIVE AND OBJECTIVE BOX
[facility] -- Milo Thomas, Binta Mata Pannella, Patel, Savella  Office # [phone]      Follow Up:  device extraction    Subjective/Observations: Patient seen and examined. Events noted. Resting comfortably in bed. No complaints of chest pain, dyspnea, or palpitations reported. No signs of orthopnea or PND.       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    Benign testicular tumor    History of hip replacement    Status post amputation of toe of right foot        MEDICATIONS  (STANDING):  ceFAZolin   IVPB 2000 milliGRAM(s) IV Intermittent every 8 hours  chlorhexidine 4% Liquid 1 Application(s) Topical <User Schedule>  dorzolamide 2%/timolol 0.5% Ophthalmic Solution 1 Drop(s) Both EYES two times a day  DULoxetine 60 milliGRAM(s) Oral daily  furosemide    Tablet 20 milliGRAM(s) Oral daily  gabapentin 300 milliGRAM(s) Oral two times a day  heparin  Infusion. 900 Unit(s)/Hr (9 mL/Hr) IV Continuous <Continuous>  influenza   Vaccine 0.5 milliLiter(s) IntraMuscular once  ketorolac 0.5% Ophthalmic Solution 1 Drop(s) Both EYES daily  latanoprost 0.005% Ophthalmic Solution 1 Drop(s) Both EYES at bedtime  melatonin 6 milliGRAM(s) Oral at bedtime  multivitamin 1 Tablet(s) Oral daily  pancrelipase  (CREON 36,000 Lipase Units) 2 Capsule(s) Oral three times a day with meals  pantoprazole    Tablet 40 milliGRAM(s) Oral before breakfast  pyridoxine 100 milliGRAM(s) Oral daily  sacubitril 49 mG/valsartan 51 mG 1 Tablet(s) Oral two times a day  sotalol 40 milliGRAM(s) Oral every 12 hours  tamsulosin 0.4 milliGRAM(s) Oral at bedtime    MEDICATIONS  (PRN):  acetaminophen   Tablet .. 650 milliGRAM(s) Oral every 4 hours PRN Mild Pain (1 - 3)  ALPRAZolam 0.25 milliGRAM(s) Oral three times a day PRN Anxiety/agitation  heparin   Injectable 8000 Unit(s) IV Push every 6 hours PRN For aPTT less than 40  heparin   Injectable 4000 Unit(s) IV Push every 6 hours PRN For aPTT between 40 - 57  oxycodone    5 mG/acetaminophen 325 mG 2 Tablet(s) Oral every 4 hours PRN Severe Pain (7 - 10)  oxycodone    5 mG/acetaminophen 325 mG 1 Tablet(s) Oral every 4 hours PRN Moderate Pain (4 - 6)      Allergies    No Known Allergies    Intolerances            Vital Signs Last 24 Hrs  T(C): 37 (01 Oct 2021 11:07), Max: 37 (01 Oct 2021 11:07)  T(F): 98.6 (01 Oct 2021 11:07), Max: 98.6 (01 Oct 2021 11:07)  HR: 80 (01 Oct 2021 11:07) (68 - 92)  BP: 102/64 (01 Oct 2021 11:07) (84/48 - 111/57)  BP(mean): 63 (30 Sep 2021 16:00) (63 - 77)  RR: 18 (01 Oct 2021 11:07) (0 - 18)  SpO2: 94% (01 Oct 2021 11:07) (94% - 100%)    I&O's Summary    30 Sep 2021 07:01  -  01 Oct 2021 07:00  --------------------------------------------------------  IN: 0 mL / OUT: 2200 mL / NET: -2200 mL      Weight (kg): 100 (09-30 @ 13:24)    PHYSICAL EXAM:  TELE:   Constitutional: NAD, awake and alert, well-developed  HEENT: Moist Mucous Membranes, Anicteric  Pulmonary: Non-labored, breath sounds are clear bilaterally, No wheezing, rales or rhonchi  Cardiovascular: Regular, S1 and S2, No murmurs, rubs, gallops or clicks  Gastrointestinal: Bowel Sounds present, soft, nontender.   Lymph: No peripheral edema. No lymphadenopathy.  Skin: No visible rashes or ulcers.  Psych:  Mood & affect appropriate for situation    LABS: All Labs Reviewed:                        9.3    6.17  )-----------( 281      ( 01 Oct 2021 01:55 )             30.1                         8.7    5.02  )-----------( 278      ( 30 Sep 2021 05:13 )             28.4                         9.3    6.44  )-----------( 317      ( 29 Sep 2021 13:39 )             30.8     01 Oct 2021 05:40    135    |  99     |  16     ----------------------------<  105    4.2     |  26     |  0.81   30 Sep 2021 05:13    137    |  100    |  14     ----------------------------<  131    4.2     |  26     |  0.91   29 Sep 2021 07:03    136    |  101    |  14     ----------------------------<  92     4.6     |  25     |  0.75     Ca    9.1        01 Oct 2021 05:40  Ca    9.1        30 Sep 2021 05:13  Ca    9.2        29 Sep 2021 07:03  Phos  4.6       01 Oct 2021 05:40  Phos  4.3       30 Sep 2021 05:13  Phos  4.5       29 Sep 2021 07:03  Mg     1.8       01 Oct 2021 05:40  Mg     1.8       30 Sep 2021 05:13  Mg     1.9       29 Sep 2021 07:03      PT/INR - ( 30 Sep 2021 05:13 )   PT: 13.1 sec;   INR: 1.10 ratio         PTT - ( 01 Oct 2021 10:48 )  PTT:51.4 sec

## 2021-10-02 LAB
ANION GAP SERPL CALC-SCNC: 11 MMOL/L — SIGNIFICANT CHANGE UP (ref 5–17)
BUN SERPL-MCNC: 19 MG/DL — SIGNIFICANT CHANGE UP (ref 7–23)
CALCIUM SERPL-MCNC: 8.5 MG/DL — SIGNIFICANT CHANGE UP (ref 8.4–10.5)
CHLORIDE SERPL-SCNC: 102 MMOL/L — SIGNIFICANT CHANGE UP (ref 96–108)
CO2 SERPL-SCNC: 22 MMOL/L — SIGNIFICANT CHANGE UP (ref 22–31)
CREAT SERPL-MCNC: 0.9 MG/DL — SIGNIFICANT CHANGE UP (ref 0.5–1.3)
GLUCOSE SERPL-MCNC: 85 MG/DL — SIGNIFICANT CHANGE UP (ref 70–99)
HCT VFR BLD CALC: 28.1 % — LOW (ref 39–50)
HGB BLD-MCNC: 8.6 G/DL — LOW (ref 13–17)
MAGNESIUM SERPL-MCNC: 1.6 MG/DL — SIGNIFICANT CHANGE UP (ref 1.6–2.6)
MCHC RBC-ENTMCNC: 30.6 GM/DL — LOW (ref 32–36)
MCHC RBC-ENTMCNC: 32.5 PG — SIGNIFICANT CHANGE UP (ref 27–34)
MCV RBC AUTO: 106 FL — HIGH (ref 80–100)
NRBC # BLD: 0 /100 WBCS — SIGNIFICANT CHANGE UP (ref 0–0)
PHOSPHATE SERPL-MCNC: 4.1 MG/DL — SIGNIFICANT CHANGE UP (ref 2.5–4.5)
PLATELET # BLD AUTO: 243 K/UL — SIGNIFICANT CHANGE UP (ref 150–400)
POTASSIUM SERPL-MCNC: 4 MMOL/L — SIGNIFICANT CHANGE UP (ref 3.5–5.3)
POTASSIUM SERPL-SCNC: 4 MMOL/L — SIGNIFICANT CHANGE UP (ref 3.5–5.3)
RBC # BLD: 2.65 M/UL — LOW (ref 4.2–5.8)
RBC # FLD: 15.6 % — HIGH (ref 10.3–14.5)
SODIUM SERPL-SCNC: 135 MMOL/L — SIGNIFICANT CHANGE UP (ref 135–145)
WBC # BLD: 6.58 K/UL — SIGNIFICANT CHANGE UP (ref 3.8–10.5)
WBC # FLD AUTO: 6.58 K/UL — SIGNIFICANT CHANGE UP (ref 3.8–10.5)

## 2021-10-02 PROCEDURE — 99232 SBSQ HOSP IP/OBS MODERATE 35: CPT

## 2021-10-02 PROCEDURE — 74177 CT ABD & PELVIS W/CONTRAST: CPT | Mod: 26

## 2021-10-02 PROCEDURE — 99233 SBSQ HOSP IP/OBS HIGH 50: CPT | Mod: GC

## 2021-10-02 RX ORDER — SODIUM CHLORIDE 9 MG/ML
500 INJECTION INTRAMUSCULAR; INTRAVENOUS; SUBCUTANEOUS ONCE
Refills: 0 | Status: COMPLETED | OUTPATIENT
Start: 2021-10-02 | End: 2021-10-02

## 2021-10-02 RX ORDER — MAGNESIUM SULFATE 500 MG/ML
2 VIAL (ML) INJECTION ONCE
Refills: 0 | Status: COMPLETED | OUTPATIENT
Start: 2021-10-02 | End: 2021-10-02

## 2021-10-02 RX ORDER — SIMETHICONE 80 MG/1
80 TABLET, CHEWABLE ORAL ONCE
Refills: 0 | Status: COMPLETED | OUTPATIENT
Start: 2021-10-02 | End: 2021-10-02

## 2021-10-02 RX ORDER — SACUBITRIL AND VALSARTAN 24; 26 MG/1; MG/1
1 TABLET, FILM COATED ORAL
Refills: 0 | Status: DISCONTINUED | OUTPATIENT
Start: 2021-10-02 | End: 2021-10-05

## 2021-10-02 RX ADMIN — Medication 1 DROP(S): at 11:59

## 2021-10-02 RX ADMIN — TAMSULOSIN HYDROCHLORIDE 0.4 MILLIGRAM(S): 0.4 CAPSULE ORAL at 22:11

## 2021-10-02 RX ADMIN — PANTOPRAZOLE SODIUM 40 MILLIGRAM(S): 20 TABLET, DELAYED RELEASE ORAL at 05:57

## 2021-10-02 RX ADMIN — Medication 2 CAPSULE(S): at 08:17

## 2021-10-02 RX ADMIN — SACUBITRIL AND VALSARTAN 1 TABLET(S): 24; 26 TABLET, FILM COATED ORAL at 05:56

## 2021-10-02 RX ADMIN — Medication 0.25 MILLIGRAM(S): at 22:11

## 2021-10-02 RX ADMIN — OXYCODONE AND ACETAMINOPHEN 1 TABLET(S): 5; 325 TABLET ORAL at 17:00

## 2021-10-02 RX ADMIN — LATANOPROST 1 DROP(S): 0.05 SOLUTION/ DROPS OPHTHALMIC; TOPICAL at 22:12

## 2021-10-02 RX ADMIN — GABAPENTIN 300 MILLIGRAM(S): 400 CAPSULE ORAL at 17:01

## 2021-10-02 RX ADMIN — CHLORHEXIDINE GLUCONATE 1 APPLICATION(S): 213 SOLUTION TOPICAL at 05:55

## 2021-10-02 RX ADMIN — DORZOLAMIDE HYDROCHLORIDE TIMOLOL MALEATE 1 DROP(S): 20; 5 SOLUTION/ DROPS OPHTHALMIC at 18:49

## 2021-10-02 RX ADMIN — OXYCODONE AND ACETAMINOPHEN 1 TABLET(S): 5; 325 TABLET ORAL at 18:00

## 2021-10-02 RX ADMIN — Medication 100 MILLIGRAM(S): at 11:59

## 2021-10-02 RX ADMIN — OXYCODONE AND ACETAMINOPHEN 2 TABLET(S): 5; 325 TABLET ORAL at 10:23

## 2021-10-02 RX ADMIN — Medication 100 MILLIGRAM(S): at 13:22

## 2021-10-02 RX ADMIN — DORZOLAMIDE HYDROCHLORIDE TIMOLOL MALEATE 1 DROP(S): 20; 5 SOLUTION/ DROPS OPHTHALMIC at 05:57

## 2021-10-02 RX ADMIN — SIMETHICONE 80 MILLIGRAM(S): 80 TABLET, CHEWABLE ORAL at 11:58

## 2021-10-02 RX ADMIN — OXYCODONE AND ACETAMINOPHEN 1 TABLET(S): 5; 325 TABLET ORAL at 22:11

## 2021-10-02 RX ADMIN — Medication 20 MILLIGRAM(S): at 05:58

## 2021-10-02 RX ADMIN — RIVAROXABAN 20 MILLIGRAM(S): KIT at 17:03

## 2021-10-02 RX ADMIN — GABAPENTIN 300 MILLIGRAM(S): 400 CAPSULE ORAL at 05:57

## 2021-10-02 RX ADMIN — OXYCODONE AND ACETAMINOPHEN 2 TABLET(S): 5; 325 TABLET ORAL at 09:23

## 2021-10-02 RX ADMIN — DULOXETINE HYDROCHLORIDE 60 MILLIGRAM(S): 30 CAPSULE, DELAYED RELEASE ORAL at 11:58

## 2021-10-02 RX ADMIN — Medication 6 MILLIGRAM(S): at 22:11

## 2021-10-02 RX ADMIN — Medication 50 GRAM(S): at 09:24

## 2021-10-02 RX ADMIN — OXYCODONE AND ACETAMINOPHEN 1 TABLET(S): 5; 325 TABLET ORAL at 23:10

## 2021-10-02 RX ADMIN — Medication 40 MILLIGRAM(S): at 17:01

## 2021-10-02 RX ADMIN — Medication 40 MILLIGRAM(S): at 05:56

## 2021-10-02 RX ADMIN — Medication 100 MILLIGRAM(S): at 22:11

## 2021-10-02 RX ADMIN — SODIUM CHLORIDE 1000 MILLILITER(S): 9 INJECTION INTRAMUSCULAR; INTRAVENOUS; SUBCUTANEOUS at 12:29

## 2021-10-02 RX ADMIN — Medication 2 CAPSULE(S): at 17:01

## 2021-10-02 RX ADMIN — Medication 2 CAPSULE(S): at 11:58

## 2021-10-02 RX ADMIN — Medication 100 MILLIGRAM(S): at 06:01

## 2021-10-02 RX ADMIN — Medication 1 TABLET(S): at 11:59

## 2021-10-02 NOTE — PROGRESS NOTE ADULT - PROBLEM SELECTOR PLAN 7
- Diagnosed in March 2021, on chemotherapy (does not recall name of medication)  - Continue home Pancrelipase 36,000 2 tabs PO TID with meals  - Per podiatry, Dr. López spoke with Dr. Murphy at [facility] to be postponed until after resolution of acute OM infection, but does not need to wait until full course abx completed per ID.   -Pain meds as above - Diagnosed in March 2021, on chemotherapy (does not recall name of medication)  - Continue home Pancrelipase 36,000 2 tabs PO TID with meals  - Per podiatry, Dr. López spoke with Dr. Murphy at [facility] to be postponed until after resolution of acute OM infection, but does not need to wait until full course abx completed per ID.   -Pain meds as above  -Dr. Mercer/Surgery consulted for second opinion regarding [facility]. Will obtain CT abd/pelvis.

## 2021-10-02 NOTE — PROGRESS NOTE ADULT - PROBLEM SELECTOR PLAN 2
- ICD placed >20 years ago, 2/2 sustained VT  - Denied cardiac arrest, suspect sustained VT with pulse and had ICD placement  - Continue sotalol BID  -ICD interrogated at OSH, no shocks recorded, ICD functioning battery life 2.5 years; Vpaced <1%  - ICD extracted as above.   -Pt with apparent AICD fired x 1 overnight 9/25, pt asymptomatic, denied chest pain other than feeling shock. Will monitor. Currently sinus with minimal v-pacing on tele.   -Per ID pt cleared for subcutaneous ICD placement once >2 weeks from abx clearance. Discussed with EP. Plan to place new subcutaneous ICD on Monday

## 2021-10-02 NOTE — PROGRESS NOTE ADULT - SUBJECTIVE AND OBJECTIVE BOX
PROGRESS NOTE:   Authored by Fady Saenz MD, PGY1 [facility] Pager [phone] [facility] pager [phone]    Patient is a 80y old  Male who presents with a chief complaint of osteomylitis, c/f endocarditis (01 Oct 2021 17:04)      SUBJECTIVE / OVERNIGHT EVENTS:     REVIEW OF SYSTEMS:    CONSTITUTIONAL: No weakness, fevers or chills  EYES/ENT: No visual changes;  No vertigo or throat pain   NECK: No pain or stiffness  RESPIRATORY: No cough, wheezing, hemoptysis; No shortness of breath  CARDIOVASCULAR: No chest pain or palpitations  GASTROINTESTINAL: No abdominal or epigastric pain. No nausea, vomiting, or hematemesis; No diarrhea or constipation. No melena or hematochezia.  GENITOURINARY: No dysuria, frequency or hematuria  NEUROLOGICAL: No numbness or weakness  SKIN: No itching, rashes    MEDICATIONS  (STANDING):  ceFAZolin   IVPB 2000 milliGRAM(s) IV Intermittent every 8 hours  chlorhexidine 4% Liquid 1 Application(s) Topical <User Schedule>  dorzolamide 2%/timolol 0.5% Ophthalmic Solution 1 Drop(s) Both EYES two times a day  DULoxetine 60 milliGRAM(s) Oral daily  furosemide    Tablet 20 milliGRAM(s) Oral daily  gabapentin 300 milliGRAM(s) Oral two times a day  influenza   Vaccine 0.5 milliLiter(s) IntraMuscular once  ketorolac 0.5% Ophthalmic Solution 1 Drop(s) Both EYES daily  latanoprost 0.005% Ophthalmic Solution 1 Drop(s) Both EYES at bedtime  melatonin 6 milliGRAM(s) Oral at bedtime  multivitamin 1 Tablet(s) Oral daily  pancrelipase  (CREON 36,000 Lipase Units) 2 Capsule(s) Oral three times a day with meals  pantoprazole    Tablet 40 milliGRAM(s) Oral before breakfast  pyridoxine 100 milliGRAM(s) Oral daily  rivaroxaban 20 milliGRAM(s) Oral with dinner  sacubitril 49 mG/valsartan 51 mG 1 Tablet(s) Oral two times a day  sotalol 40 milliGRAM(s) Oral every 12 hours  tamsulosin 0.4 milliGRAM(s) Oral at bedtime    MEDICATIONS  (PRN):  acetaminophen   Tablet .. 650 milliGRAM(s) Oral every 4 hours PRN Mild Pain (1 - 3)  ALPRAZolam 0.25 milliGRAM(s) Oral three times a day PRN Anxiety/agitation  oxycodone    5 mG/acetaminophen 325 mG 2 Tablet(s) Oral every 4 hours PRN Severe Pain (7 - 10)  oxycodone    5 mG/acetaminophen 325 mG 1 Tablet(s) Oral every 4 hours PRN Moderate Pain (4 - 6)      CAPILLARY BLOOD GLUCOSE        I&O's Summary    01 Oct 2021 07:01  -  02 Oct 2021 07:00  --------------------------------------------------------  IN: 1118 mL / OUT: 1500 mL / NET: -382 mL        PHYSICAL EXAM:  Vital Signs Last 24 Hrs  T(C): 36.4 (02 Oct 2021 04:36), Max: 37 (01 Oct 2021 11:07)  T(F): 97.6 (02 Oct 2021 04:36), Max: 98.6 (01 Oct 2021 11:07)  HR: 94 (02 Oct 2021 05:52) (80 - 100)  BP: 105/60 (02 Oct 2021 05:52) (98/58 - 105/60)  BP(mean): --  RR: 18 (02 Oct 2021 04:36) (18 - 18)  SpO2: 95% (02 Oct 2021 04:36) (94% - 95%)    CONSTITUTIONAL: NAD, well-developed  RESPIRATORY: Normal respiratory effort; lungs are clear to auscultation bilaterally  CARDIOVASCULAR: Regular rate and rhythm, normal S1 and S2, no murmur/rub/gallop; No lower extremity edema; Peripheral pulses are 2+ bilaterally  ABDOMEN: Nontender to palpation, normoactive bowel sounds, no rebound/guarding; No hepatosplenomegaly  MUSCLOSKELETAL: no clubbing or cyanosis of digits; no joint swelling or tenderness to palpation  PSYCH: A+O to person, place, and time; affect appropriate  NEURO: Non-focal, no tremors  SKIN: No rashes    LABS:                        8.6    6.58  )-----------( 243      ( 02 Oct 2021 06:42 )             28.1     10-01    135  |  99  |  16  ----------------------------<  105<H>  4.2   |  26  |  0.81    Ca    9.1      01 Oct 2021 05:40  Phos  4.6     10-01  Mg     1.8     10-01      PTT - ( 01 Oct 2021 10:48 )  PTT:51.4 sec            RADIOLOGY & ADDITIONAL TESTS:  No new imaging or tests    COORDINATION OF CARE:  Care Discussed with Consultants/Other Providers [Y/N]:  Prior or Outpatient Records Reviewed [Y/N]:   PROGRESS NOTE:   Authored by Fady Saenz MD, PGY1 [facility] Pager [phone] [facility] pager [phone]    Patient is a 80y old  Male who presents with a chief complaint of osteomylitis, c/f endocarditis (01 Oct 2021 17:04)      SUBJECTIVE / OVERNIGHT EVENTS: No acute events overnight. Notes pain near left chest surgical site is present but somewhat improved. Foot pain is about the same, relieved with pain meds.     REVIEW OF SYSTEMS:    CONSTITUTIONAL: No weakness, fevers or chills  EYES/ENT: No visual changes;  No vertigo or throat pain   NECK: No pain or stiffness  RESPIRATORY: No cough, wheezing, hemoptysis; No shortness of breath  CARDIOVASCULAR: No chest pain or palpitations  GASTROINTESTINAL: No abdominal or epigastric pain. No nausea, vomiting, or hematemesis; No diarrhea or constipation. No melena or hematochezia.  GENITOURINARY: No dysuria, frequency or hematuria  NEUROLOGICAL: No numbness or weakness  SKIN: No itching, rashes    MEDICATIONS  (STANDING):  ceFAZolin   IVPB 2000 milliGRAM(s) IV Intermittent every 8 hours  chlorhexidine 4% Liquid 1 Application(s) Topical <User Schedule>  dorzolamide 2%/timolol 0.5% Ophthalmic Solution 1 Drop(s) Both EYES two times a day  DULoxetine 60 milliGRAM(s) Oral daily  furosemide    Tablet 20 milliGRAM(s) Oral daily  gabapentin 300 milliGRAM(s) Oral two times a day  influenza   Vaccine 0.5 milliLiter(s) IntraMuscular once  ketorolac 0.5% Ophthalmic Solution 1 Drop(s) Both EYES daily  latanoprost 0.005% Ophthalmic Solution 1 Drop(s) Both EYES at bedtime  melatonin 6 milliGRAM(s) Oral at bedtime  multivitamin 1 Tablet(s) Oral daily  pancrelipase  (CREON 36,000 Lipase Units) 2 Capsule(s) Oral three times a day with meals  pantoprazole    Tablet 40 milliGRAM(s) Oral before breakfast  pyridoxine 100 milliGRAM(s) Oral daily  rivaroxaban 20 milliGRAM(s) Oral with dinner  sacubitril 49 mG/valsartan 51 mG 1 Tablet(s) Oral two times a day  sotalol 40 milliGRAM(s) Oral every 12 hours  tamsulosin 0.4 milliGRAM(s) Oral at bedtime    MEDICATIONS  (PRN):  acetaminophen   Tablet .. 650 milliGRAM(s) Oral every 4 hours PRN Mild Pain (1 - 3)  ALPRAZolam 0.25 milliGRAM(s) Oral three times a day PRN Anxiety/agitation  oxycodone    5 mG/acetaminophen 325 mG 2 Tablet(s) Oral every 4 hours PRN Severe Pain (7 - 10)  oxycodone    5 mG/acetaminophen 325 mG 1 Tablet(s) Oral every 4 hours PRN Moderate Pain (4 - 6)      CAPILLARY BLOOD GLUCOSE        I&O's Summary    01 Oct 2021 07:01  -  02 Oct 2021 07:00  --------------------------------------------------------  IN: 1118 mL / OUT: 1500 mL / NET: -382 mL        PHYSICAL EXAM:  Vital Signs Last 24 Hrs  T(C): 36.4 (02 Oct 2021 04:36), Max: 37 (01 Oct 2021 11:07)  T(F): 97.6 (02 Oct 2021 04:36), Max: 98.6 (01 Oct 2021 11:07)  HR: 94 (02 Oct 2021 05:52) (80 - 100)  BP: 105/60 (02 Oct 2021 05:52) (98/58 - 105/60)  BP(mean): --  RR: 18 (02 Oct 2021 04:36) (18 - 18)  SpO2: 95% (02 Oct 2021 04:36) (94% - 95%)    CONSTITUTIONAL: NAD, well-developed  RESPIRATORY: Normal respiratory effort; lungs are clear to auscultation bilaterally  CARDIOVASCULAR: Regular rate and rhythm, normal S1 and S2, no murmur/rub/gallop; No lower extremity edema; Peripheral pulses are 2+ bilaterally  ABDOMEN: Nontender to palpation, normoactive bowel sounds, no rebound/guarding; No hepatosplenomegaly  MUSCLOSKELETAL: no clubbing or cyanosis of digits; R. Foot dressed with dressing slightly bloody.   PSYCH: A+O to person, place, and time; affect appropriate  NEURO: Non-focal, no tremors  SKIN: No rashes. L chest wall TTP. No warmth, erythema, swelling, or hematoma. Surgical site Left upper chest clean and dry without bleeding    LABS:                        8.6    6.58  )-----------( 243      ( 02 Oct 2021 06:42 )             28.1     10-01    135  |  99  |  16  ----------------------------<  105<H>  4.2   |  26  |  0.81    Ca    9.1      01 Oct 2021 05:40  Phos  4.6     10-01  Mg     1.8     10-01      PTT - ( 01 Oct 2021 10:48 )  PTT:51.4 sec            RADIOLOGY & ADDITIONAL TESTS:  No new imaging or tests    COORDINATION OF CARE:  Care Discussed with Consultants/Other Providers [Y/N]:  Prior or Outpatient Records Reviewed [Y/N]:

## 2021-10-02 NOTE — PROVIDER CONTACT NOTE (OTHER) - ACTION/TREATMENT ORDERED:
Provider notified and aware. Continue monitoring on tele. Pt safety maintained. Provider notified and aware. Continue monitoring on tele. No intervention at this time. Pt safety maintained.

## 2021-10-02 NOTE — PROGRESS NOTE ADULT - ATTENDING COMMENTS
above plans discussed with Dr. Saenz    # MSSA bacteremia  # RLE cellulitis/OM of hallux  # concern for ICD infection  # chronic HFrEF  # PE/DVT on xarelto    - CORBIN with no clear vegetation but given high risk s/p ICD extraction  - s/p mediport removal as well  - continue IV ancef via PICC line (will not need total 4 week course); ID consulted - appreciate ID recs   - appreciate EP recs: plan for subcutaneous ICD placement next Monday  - Dr. Mercer (surg/onc) consulted for second opinion  - Obtain CTAP with IV contrast, pancreas protocol  - IR consulted for removal of mediport; no plan for chemoRx for now  - no signs of volume overload at this time; continue home dose lasix 20mg daily  - resume on xarelto  - PT consult: previously recommended for TRINA Feng MD  Division of Hospital Medicine  Cell: [phone]  Office: [phone]

## 2021-10-02 NOTE — PROGRESS NOTE ADULT - ASSESSMENT
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    Cellulitis of right lower leg and Acute OM of right hallux  - s/p right 1st partial ray resection.  Tolerated procedure well with no obvious cardiac complications  - MSSA bacteremia on admission BCx; repeat BCx from 9/16 are NGTD  - s/p CORBIN in the setting of TAVR and ICD. no vegetations, ef 30-35  - s/p icd extraction and tolerated procedure well  - plan for subq icd next week.   - Abx per ID.  Follow recs      Pulmonary embolism.   - Bilateral PE in 7/2020, on Xarelto   - Xarelto resumed    Chronic systolic HF S/P ICD, MS    - moderate lv dysfxn and moderate ms based on annular calcification  - Continue Entresto   - bp at time soft. would hold lasix for few days.   - No signs of acute volume overload. Tolerating Room air  - Strict I/Os, daily weights  - Monitor and replete Lytes. Keep K > 4 and Mg > 2  - on sotalol for vt in the past    HTN  - continue sotalol with hold parameters  - continue entresto with hold parameters  - if bp remains soft hold lasix temporarily     - Monitor and replete lytes, keep K>4, Mg>2.  - All other medical needs as per primary team.  - Other cardiovascular workup will depend on clinical course.  - Will continue to follow.

## 2021-10-02 NOTE — PROGRESS NOTE ADULT - ASSESSMENT
Mr. 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 Given recent TAVR, transferred [facility] for CORBIN and consideration of ICD extraction.

## 2021-10-02 NOTE — PROGRESS NOTE ADULT - PROBLEM SELECTOR PLAN 1
With RLE cellulitis  - Initial blood cultures 9/15 positive for MSSA bacteremia  - Repeat BCx from 9/16 NGTD  - S/p R. hallux amputation 9/20  Surgical pathology of right hallux positive for acute osteomyelitis, MSSA   - Continue cefazolin 2g q8h per ID recs at [place]. Plan is to continue 4-6 weeks abx depending on result of CORBIN (as late as 10/27)  - S/P PICC line placement 9/22 RUE  - Pain well controlled w/ current regimen   - S/p RRT 9/23 for ruptured hematoma @ surgical site during PT, wound irrigated and packed, left open to heal by secondary intention  - Outpatient podiatrist dr Felipe. Podiatry reconsulted. Will f/u recs.   - Cardiology/EP following. CORBIN performed 9/27 without evidence of vegetations.   -ICD extraction performed 9/28 without complications.   -Id following. Will appreciate further recs.   -S/P mediport removal 9/30

## 2021-10-02 NOTE — PROGRESS NOTE ADULT - PROBLEM SELECTOR PLAN 4
- Bilateral PE in 7/2020, on Xarelto   -Previously on Heparin drip given need for multiple procedures.   - Restarted xarelto 10/1. Will hold Sunday dose in preparation for ICD placement Monday

## 2021-10-02 NOTE — PROGRESS NOTE ADULT - SUBJECTIVE AND OBJECTIVE BOX
[facility] - Milo Thomas, Adolfo, Binta, Brittanie, Javier Don  Office Number:  [phone]    Patient resting comfortably in bed in NAD.  Laying flat with no respiratory distress.  No complaints of chest pain, dyspnea, palpitations, PND, or orthopnea.    F/U for:  Bacteremia and device infection    Telemetry:  SR.  Brief PAT    MEDICATIONS  (STANDING):  ceFAZolin   IVPB 2000 milliGRAM(s) IV Intermittent every 8 hours  chlorhexidine 4% Liquid 1 Application(s) Topical <User Schedule>  dorzolamide 2%/timolol 0.5% Ophthalmic Solution 1 Drop(s) Both EYES two times a day  DULoxetine 60 milliGRAM(s) Oral daily  furosemide    Tablet 20 milliGRAM(s) Oral daily  gabapentin 300 milliGRAM(s) Oral two times a day  influenza   Vaccine 0.5 milliLiter(s) IntraMuscular once  ketorolac 0.5% Ophthalmic Solution 1 Drop(s) Both EYES daily  latanoprost 0.005% Ophthalmic Solution 1 Drop(s) Both EYES at bedtime  melatonin 6 milliGRAM(s) Oral at bedtime  multivitamin 1 Tablet(s) Oral daily  pancrelipase  (CREON 36,000 Lipase Units) 2 Capsule(s) Oral three times a day with meals  pantoprazole    Tablet 40 milliGRAM(s) Oral before breakfast  pyridoxine 100 milliGRAM(s) Oral daily  rivaroxaban 20 milliGRAM(s) Oral with dinner  sacubitril 49 mG/valsartan 51 mG 1 Tablet(s) Oral two times a day  simethicone 80 milliGRAM(s) Chew once  sotalol 40 milliGRAM(s) Oral every 12 hours  tamsulosin 0.4 milliGRAM(s) Oral at bedtime    MEDICATIONS  (PRN):  acetaminophen   Tablet .. 650 milliGRAM(s) Oral every 4 hours PRN Mild Pain (1 - 3)  ALPRAZolam 0.25 milliGRAM(s) Oral three times a day PRN Anxiety/agitation  oxycodone    5 mG/acetaminophen 325 mG 2 Tablet(s) Oral every 4 hours PRN Severe Pain (7 - 10)  oxycodone    5 mG/acetaminophen 325 mG 1 Tablet(s) Oral every 4 hours PRN Moderate Pain (4 - 6)      Allergies    No Known Allergies             Vital Signs Last 24 Hrs  T(C): 36.4 (02 Oct 2021 04:36), Max: 36.8 (01 Oct 2021 20:07)  T(F): 97.6 (02 Oct 2021 04:36), Max: 98.2 (01 Oct 2021 20:07)  HR: 94 (02 Oct 2021 05:52) (91 - 100)  BP: 105/60 (02 Oct 2021 05:52) (98/58 - 105/60)  BP(mean): --  RR: 18 (02 Oct 2021 04:36) (18 - 18)  SpO2: 95% (02 Oct 2021 04:36) (95% - 95%)    I&O's Summary    01 Oct 2021 07:01  -  02 Oct 2021 07:00  --------------------------------------------------------  IN: 1118 mL / OUT: 1500 mL / NET: -382 mL        ON EXAM:    Constitutional: NAD, awake and alert, well-developed  HEENT: Moist Mucous Membranes, Anicteric  Pulmonary: Non-labored, breath sounds are clear bilaterally, No wheezing, rales or rhonchi  Cardiovascular: Regular, S1 and S2, No murmurs, rubs, gallops or clicks  Gastrointestinal: Bowel Sounds present, soft, nontender.   Lymph: No peripheral edema. No lymphadenopathy.  Skin: No visible rashes or ulcers.  Psych:  Mood & affect appropriate for situation      LABS: All Labs Reviewed:                        8.6    6.58  )-----------( 243      ( 02 Oct 2021 06:42 )             28.1                         9.3    6.17  )-----------( 281      ( 01 Oct 2021 01:55 )             30.1                         8.7    5.02  )-----------( 278      ( 30 Sep 2021 05:13 )             28.4     02 Oct 2021 06:44    135    |  102    |  19     ----------------------------<  85     4.0     |  22     |  0.90   01 Oct 2021 05:40    135    |  99     |  16     ----------------------------<  105    4.2     |  26     |  0.81   30 Sep 2021 05:13    137    |  100    |  14     ----------------------------<  131    4.2     |  26     |  0.91     Ca    8.5        02 Oct 2021 06:44  Ca    9.1        01 Oct 2021 05:40  Ca    9.1        30 Sep 2021 05:13  Phos  4.1       02 Oct 2021 06:44  Phos  4.6       01 Oct 2021 05:40  Phos  4.3       30 Sep 2021 05:13  Mg     1.6       02 Oct 2021 06:44  Mg     1.8       01 Oct 2021 05:40  Mg     1.8       30 Sep 2021 05:13      PTT - ( 01 Oct 2021 10:48 )  PTT:51.4 sec      Blood Culture: Organism --  Gram Stain Blood -- Gram Stain --  Specimen Source .Other icd lead tip  Culture-Blood --    Organism --  Gram Stain Blood -- Gram Stain --  Specimen Source .Other icd lead tip  Culture-Blood --    Organism --  Gram Stain Blood -- Gram Stain --  Specimen Source .Surgical Swab icd lead tip  Culture-Blood --

## 2021-10-02 NOTE — PROGRESS NOTE ADULT - PROBLEM SELECTOR PLAN 8
- Patient with complaints of coughing/choking on food on 9/18  - S/S consulted at OSH, recs dysphagia 3 soft-thin liquids. Reconsulted at [facility] and recommended pt be restarted on normal diet.  -Aspiration precautions

## 2021-10-03 LAB
ANION GAP SERPL CALC-SCNC: 9 MMOL/L — SIGNIFICANT CHANGE UP (ref 5–17)
BLD GP AB SCN SERPL QL: NEGATIVE — SIGNIFICANT CHANGE UP
BUN SERPL-MCNC: 17 MG/DL — SIGNIFICANT CHANGE UP (ref 7–23)
CALCIUM SERPL-MCNC: 9.3 MG/DL — SIGNIFICANT CHANGE UP (ref 8.4–10.5)
CANCER AG19-9 SERPL-ACNC: 53 U/ML — HIGH
CEA SERPL-MCNC: 4 NG/ML — HIGH (ref 0–3.8)
CHLORIDE SERPL-SCNC: 102 MMOL/L — SIGNIFICANT CHANGE UP (ref 96–108)
CO2 SERPL-SCNC: 24 MMOL/L — SIGNIFICANT CHANGE UP (ref 22–31)
CREAT SERPL-MCNC: 0.77 MG/DL — SIGNIFICANT CHANGE UP (ref 0.5–1.3)
GLUCOSE SERPL-MCNC: 103 MG/DL — HIGH (ref 70–99)
HCT VFR BLD CALC: 27.8 % — LOW (ref 39–50)
HGB BLD-MCNC: 8.7 G/DL — LOW (ref 13–17)
MAGNESIUM SERPL-MCNC: 1.9 MG/DL — SIGNIFICANT CHANGE UP (ref 1.6–2.6)
MCHC RBC-ENTMCNC: 31.3 GM/DL — LOW (ref 32–36)
MCHC RBC-ENTMCNC: 33.2 PG — SIGNIFICANT CHANGE UP (ref 27–34)
MCV RBC AUTO: 106.1 FL — HIGH (ref 80–100)
NRBC # BLD: 0 /100 WBCS — SIGNIFICANT CHANGE UP (ref 0–0)
PHOSPHATE SERPL-MCNC: 4.1 MG/DL — SIGNIFICANT CHANGE UP (ref 2.5–4.5)
PLATELET # BLD AUTO: 212 K/UL — SIGNIFICANT CHANGE UP (ref 150–400)
POTASSIUM SERPL-MCNC: 4.5 MMOL/L — SIGNIFICANT CHANGE UP (ref 3.5–5.3)
POTASSIUM SERPL-SCNC: 4.5 MMOL/L — SIGNIFICANT CHANGE UP (ref 3.5–5.3)
RBC # BLD: 2.62 M/UL — LOW (ref 4.2–5.8)
RBC # FLD: 15.5 % — HIGH (ref 10.3–14.5)
RH IG SCN BLD-IMP: POSITIVE — SIGNIFICANT CHANGE UP
SARS-COV-2 RNA SPEC QL NAA+PROBE: SIGNIFICANT CHANGE UP
SODIUM SERPL-SCNC: 135 MMOL/L — SIGNIFICANT CHANGE UP (ref 135–145)
WBC # BLD: 6.53 K/UL — SIGNIFICANT CHANGE UP (ref 3.8–10.5)
WBC # FLD AUTO: 6.53 K/UL — SIGNIFICANT CHANGE UP (ref 3.8–10.5)

## 2021-10-03 PROCEDURE — 99232 SBSQ HOSP IP/OBS MODERATE 35: CPT

## 2021-10-03 RX ORDER — MAGNESIUM SULFATE 500 MG/ML
1 VIAL (ML) INJECTION ONCE
Refills: 0 | Status: DISCONTINUED | OUTPATIENT
Start: 2021-10-03 | End: 2021-10-03

## 2021-10-03 RX ORDER — MAGNESIUM SULFATE 500 MG/ML
1 VIAL (ML) INJECTION ONCE
Refills: 0 | Status: COMPLETED | OUTPATIENT
Start: 2021-10-03 | End: 2021-10-03

## 2021-10-03 RX ADMIN — GABAPENTIN 300 MILLIGRAM(S): 400 CAPSULE ORAL at 17:45

## 2021-10-03 RX ADMIN — Medication 2 CAPSULE(S): at 11:22

## 2021-10-03 RX ADMIN — LATANOPROST 1 DROP(S): 0.05 SOLUTION/ DROPS OPHTHALMIC; TOPICAL at 21:32

## 2021-10-03 RX ADMIN — DULOXETINE HYDROCHLORIDE 60 MILLIGRAM(S): 30 CAPSULE, DELAYED RELEASE ORAL at 11:19

## 2021-10-03 RX ADMIN — Medication 2 CAPSULE(S): at 08:03

## 2021-10-03 RX ADMIN — Medication 1 TABLET(S): at 11:19

## 2021-10-03 RX ADMIN — Medication 40 MILLIGRAM(S): at 06:27

## 2021-10-03 RX ADMIN — PANTOPRAZOLE SODIUM 40 MILLIGRAM(S): 20 TABLET, DELAYED RELEASE ORAL at 06:28

## 2021-10-03 RX ADMIN — Medication 6 MILLIGRAM(S): at 21:31

## 2021-10-03 RX ADMIN — OXYCODONE AND ACETAMINOPHEN 1 TABLET(S): 5; 325 TABLET ORAL at 21:47

## 2021-10-03 RX ADMIN — Medication 0.25 MILLIGRAM(S): at 21:47

## 2021-10-03 RX ADMIN — Medication 100 MILLIGRAM(S): at 21:31

## 2021-10-03 RX ADMIN — Medication 40 MILLIGRAM(S): at 17:41

## 2021-10-03 RX ADMIN — OXYCODONE AND ACETAMINOPHEN 1 TABLET(S): 5; 325 TABLET ORAL at 08:40

## 2021-10-03 RX ADMIN — OXYCODONE AND ACETAMINOPHEN 1 TABLET(S): 5; 325 TABLET ORAL at 08:03

## 2021-10-03 RX ADMIN — Medication 100 GRAM(S): at 09:32

## 2021-10-03 RX ADMIN — GABAPENTIN 300 MILLIGRAM(S): 400 CAPSULE ORAL at 06:27

## 2021-10-03 RX ADMIN — CHLORHEXIDINE GLUCONATE 1 APPLICATION(S): 213 SOLUTION TOPICAL at 21:28

## 2021-10-03 RX ADMIN — CHLORHEXIDINE GLUCONATE 1 APPLICATION(S): 213 SOLUTION TOPICAL at 06:20

## 2021-10-03 RX ADMIN — OXYCODONE AND ACETAMINOPHEN 1 TABLET(S): 5; 325 TABLET ORAL at 22:45

## 2021-10-03 RX ADMIN — Medication 2 CAPSULE(S): at 17:42

## 2021-10-03 RX ADMIN — Medication 1 DROP(S): at 11:22

## 2021-10-03 RX ADMIN — Medication 100 MILLIGRAM(S): at 06:27

## 2021-10-03 RX ADMIN — TAMSULOSIN HYDROCHLORIDE 0.4 MILLIGRAM(S): 0.4 CAPSULE ORAL at 21:31

## 2021-10-03 RX ADMIN — Medication 100 MILLIGRAM(S): at 12:57

## 2021-10-03 RX ADMIN — DORZOLAMIDE HYDROCHLORIDE TIMOLOL MALEATE 1 DROP(S): 20; 5 SOLUTION/ DROPS OPHTHALMIC at 17:44

## 2021-10-03 RX ADMIN — DORZOLAMIDE HYDROCHLORIDE TIMOLOL MALEATE 1 DROP(S): 20; 5 SOLUTION/ DROPS OPHTHALMIC at 06:27

## 2021-10-03 RX ADMIN — Medication 100 MILLIGRAM(S): at 11:19

## 2021-10-03 NOTE — PROGRESS NOTE ADULT - ASSESSMENT
Spent 30 minutes with patient on friday.  Reviewed his CT from yesterday.  Tumor does not appear metastatic and vessels are clear-"resectable".  We will need to work on medical optimization and cardiology clearance for whipple procedure.  We will see patient in our pancreas multidisciplinary clinic, my team will reach out.  If possible, please send a  and CEA level on this admission.

## 2021-10-03 NOTE — PROGRESS NOTE ADULT - SUBJECTIVE AND OBJECTIVE BOX
[facility] - Milo Thomas, Adolfo, Binta, Brittanie, Javier Don  Office Number:  [phone]    Patient resting comfortably in bed in NAD.  Laying flat with no respiratory distress.  No complaints of chest pain, dyspnea, palpitations, PND, or orthopnea.  BP has been borderline.  Lasix stopped.    F/U for:  Device infection    Telemetry:  SR    MEDICATIONS  (STANDING):  ceFAZolin   IVPB 2000 milliGRAM(s) IV Intermittent every 8 hours  chlorhexidine 4% Liquid 1 Application(s) Topical <User Schedule>  chlorhexidine 4% Liquid 1 Application(s) Topical <User Schedule>  dorzolamide 2%/timolol 0.5% Ophthalmic Solution 1 Drop(s) Both EYES two times a day  DULoxetine 60 milliGRAM(s) Oral daily  gabapentin 300 milliGRAM(s) Oral two times a day  influenza   Vaccine 0.5 milliLiter(s) IntraMuscular once  ketorolac 0.5% Ophthalmic Solution 1 Drop(s) Both EYES daily  latanoprost 0.005% Ophthalmic Solution 1 Drop(s) Both EYES at bedtime  melatonin 6 milliGRAM(s) Oral at bedtime  multivitamin 1 Tablet(s) Oral daily  pancrelipase  (CREON 36,000 Lipase Units) 2 Capsule(s) Oral three times a day with meals  pantoprazole    Tablet 40 milliGRAM(s) Oral before breakfast  pyridoxine 100 milliGRAM(s) Oral daily  sacubitril 24 mG/valsartan 26 mG 1 Tablet(s) Oral two times a day  sotalol 40 milliGRAM(s) Oral every 12 hours  tamsulosin 0.4 milliGRAM(s) Oral at bedtime    MEDICATIONS  (PRN):  acetaminophen   Tablet .. 650 milliGRAM(s) Oral every 4 hours PRN Mild Pain (1 - 3)  ALPRAZolam 0.25 milliGRAM(s) Oral three times a day PRN Anxiety/agitation  oxycodone    5 mG/acetaminophen 325 mG 2 Tablet(s) Oral every 4 hours PRN Severe Pain (7 - 10)  oxycodone    5 mG/acetaminophen 325 mG 1 Tablet(s) Oral every 4 hours PRN Moderate Pain (4 - 6)      Allergies    No Known Allergies           Vital Signs Last 24 Hrs  T(C): 36.4 (03 Oct 2021 04:12), Max: 37 (02 Oct 2021 20:35)  T(F): 97.6 (03 Oct 2021 04:12), Max: 98.6 (02 Oct 2021 20:35)  HR: 89 (03 Oct 2021 06:05) (82 - 97)  BP: 98/54 (03 Oct 2021 06:05) (76/48 - 101/66)  BP(mean): --  RR: 18 (03 Oct 2021 04:12) (18 - 18)  SpO2: 96% (03 Oct 2021 04:12) (95% - 96%)    I&O's Summary    02 Oct 2021 07:01  -  03 Oct 2021 07:00  --------------------------------------------------------  IN: 1530 mL / OUT: 1530 mL / NET: 0 mL    03 Oct 2021 07:01  -  03 Oct 2021 11:39  --------------------------------------------------------  IN: 280 mL / OUT: 400 mL / NET: -120 mL        ON EXAM:    Constitutional: NAD, awake and alert, well-developed  HEENT: Moist Mucous Membranes, Anicteric  Pulmonary: Non-labored, breath sounds are clear bilaterally, No wheezing, rales or rhonchi  Cardiovascular: Regular, S1 and S2, No murmurs, rubs, gallops or clicks  Gastrointestinal: Bowel Sounds present, soft, nontender.   Lymph: No peripheral edema. No lymphadenopathy.  Skin: No visible rashes or ulcers.  Psych:  Mood & affect appropriate for situation  LABS: All Labs Reviewed:                        8.7    6.53  )-----------( 212      ( 03 Oct 2021 07:16 )             27.8                         8.6    6.58  )-----------( 243      ( 02 Oct 2021 06:42 )             28.1                         9.3    6.17  )-----------( 281      ( 01 Oct 2021 01:55 )             30.1     03 Oct 2021 07:12    135    |  102    |  17     ----------------------------<  103    4.5     |  24     |  0.77   02 Oct 2021 06:44    135    |  102    |  19     ----------------------------<  85     4.0     |  22     |  0.90   01 Oct 2021 05:40    135    |  99     |  16     ----------------------------<  105    4.2     |  26     |  0.81     Ca    9.3        03 Oct 2021 07:12  Ca    8.5        02 Oct 2021 06:44  Ca    9.1        01 Oct 2021 05:40  Phos  4.1       03 Oct 2021 07:12  Phos  4.1       02 Oct 2021 06:44  Phos  4.6       01 Oct 2021 05:40  Mg     1.9       03 Oct 2021 07:12  Mg     1.6       02 Oct 2021 06:44  Mg     1.8       01 Oct 2021 05:40            Blood Culture: Organism --  Gram Stain Blood -- Gram Stain --  Specimen Source .Other icd lead tip  Culture-Blood --    Organism --  Gram Stain Blood -- Gram Stain --  Specimen Source .Other icd lead tip  Culture-Blood --    Organism --  Gram Stain Blood -- Gram Stain --  Specimen Source .Surgical Swab icd lead tip  Culture-Blood --

## 2021-10-03 NOTE — PROGRESS NOTE ADULT - PROBLEM SELECTOR PLAN 2
- ICD placed >20 years ago, 2/2 sustained VT  - Denied cardiac arrest, suspect sustained VT with pulse and had ICD placement  - Continue sotalol BID  -ICD interrogated at OSH, no shocks recorded, ICD functioning battery life 2.5 years; Vpaced <1%  - ICD extracted as above.   -Pt with apparent AICD fired x 1 overnight 9/25, pt asymptomatic, denied chest pain other than feeling shock. Will monitor. Currently sinus with minimal v-pacing on tele.   -Per ID pt cleared for subcutaneous ICD placement once >2 weeks from abx clearance. Discussed with EP. Plan to place new subcutaneous ICD on Monday - ICD placed >20 years ago, 2/2 sustained VT  - Denied cardiac arrest, suspect sustained VT with pulse and had ICD placement  - Continue sotalol BID  -ICD interrogated at OSH, no shocks recorded, ICD functioning battery life 2.5 years; Vpaced <1%  - ICD extracted as above.   -Pt with apparent AICD fired x 1 overnight 9/25, pt asymptomatic, denied chest pain other than feeling shock. Will monitor. Currently sinus with minimal v-pacing on tele.   -Per ID pt cleared for subcutaneous ICD placement once >2 weeks from abx clearance. Discussed with EP. Plan to place new subcutaneous ICD on Monday 10/4  -COVID swab administered 10/3 for pre-procedure screening.

## 2021-10-03 NOTE — PROGRESS NOTE ADULT - PROBLEM SELECTOR PLAN 4
- Bilateral PE in 7/2020, on Xarelto   -Previously on Heparin drip given need for multiple procedures.   - Restarted xarelto 10/1. Will hold Sunday dose in preparation for ICD placement Monday - Bilateral PE in 7/2020, on Xarelto   -Previously on Heparin drip given need for multiple procedures.   - Restarted xarelto 10/1. Hoang 10/3 dose held in preparation for ICD placement Monday

## 2021-10-03 NOTE — PROGRESS NOTE ADULT - ASSESSMENT
Mr. Gatica is a 80 year old male with PMH pancreatic cancer (dx 3/2021, currently undergoing chemo), HTN, HLD, CHF (unknown EF), CAD s/p stents x2 (~15 years ago), defibrillator, TAVR (4/2021), bilateral PE (7/2021) on Xarelto, spinal stenosis, GERD, BPH, macular degeneration initially presented to [facility] c/o R foot pain admitted for R hallux pressure injury and RLE cellulitis found to have MSSA bacteremia and acute OM of right hallux. Given recent TAVR, transferred [facility] for CORBIN and consideration of ICD extraction.

## 2021-10-03 NOTE — PROGRESS NOTE ADULT - PROBLEM SELECTOR PLAN 8
- Patient with complaints of coughing/choking on food on 9/18  - S/S consulted at OSH, recs dysphagia 3 soft-thin liquids. Reconsulted at [facility] and recommended pt be restarted on normal diet.  -Aspiration precautions

## 2021-10-03 NOTE — PROGRESS NOTE ADULT - PROBLEM SELECTOR PLAN 1
With RLE cellulitis  - Initial blood cultures 9/15 positive for MSSA bacteremia  - Repeat BCx from 9/16 NGTD  - S/p R. hallux amputation 9/20  Surgical pathology of right hallux positive for acute osteomyelitis, MSSA   - Continue cefazolin 2g q8h per ID recs at [place]. Plan is to continue 4-6 weeks abx depending on result of CORBIN (as late as 10/27)  - S/P PICC line placement 9/22 RUE  - Pain well controlled w/ current regimen   - S/p RRT 9/23 for ruptured hematoma @ surgical site during PT, wound irrigated and packed, left open to heal by secondary intention  - Outpatient podiatrist dr Felipe. Podiatry reconsulted. Will f/u recs.   - Cardiology/EP following. CORBIN performed 9/27 without evidence of vegetations.   -ICD extraction performed 9/28 without complications.   -Id following. Will appreciate further recs.   -S/P mediport removal 9/30

## 2021-10-03 NOTE — PROGRESS NOTE ADULT - ATTENDING COMMENTS
# MSSA bacteremia  # RLE cellulitis/OM of hallux  # concern for ICD infection  # chronic HFrEF  # PE/DVT on xarelto    - CORBIN with no clear vegetation but given high risk s/p ICD extraction  - s/p mediport removal as well  - continue IV ancef via PICC line (will not need total 4 week course); ID following- appreciate ID recs   - appreciate EP recs: plan for subcutaneous ICD placement 10/4  - Dr. Mercer (surg/onc) consulted for second opinion- awaiting consult   - holding xarelto given procedure  - PT consult: previously recommended for TRINA Harper D.O.  Hospitalist- available on MS teams

## 2021-10-03 NOTE — PROGRESS NOTE ADULT - SUBJECTIVE AND OBJECTIVE BOX
Chandler Ann MD  Internal Medicine PGY-1  Pager NS: [phone] // [facility]: [phone]    PROGRESS NOTE:     Patient is a 80y old  Male who presents with a chief complaint of osteomylitis, c/f endocarditis (02 Oct 2021 11:19)      SUBJECTIVE / OVERNIGHT EVENTS:    No acute events overnight. Patient examined at bedside with no acute complaints.     REVIEW OF SYSTEMS:    CONSTITUTIONAL: No weakness, fevers or chills  EYES/ENT: No visual changes;  No vertigo or throat pain   NECK: No pain or stiffness  RESPIRATORY: No cough, wheezing, hemoptysis; No shortness of breath  CARDIOVASCULAR: No chest pain or palpitations  GASTROINTESTINAL: No abdominal or epigastric pain. No nausea, vomiting, or hematemesis; No diarrhea or constipation. No melena or hematochezia.  GENITOURINARY: No dysuria, frequency or hematuria  NEUROLOGICAL: No numbness or weakness  SKIN: No itching, rashes      MEDICATIONS  (STANDING):  ceFAZolin   IVPB 2000 milliGRAM(s) IV Intermittent every 8 hours  chlorhexidine 4% Liquid 1 Application(s) Topical <User Schedule>  chlorhexidine 4% Liquid 1 Application(s) Topical <User Schedule>  dorzolamide 2%/timolol 0.5% Ophthalmic Solution 1 Drop(s) Both EYES two times a day  DULoxetine 60 milliGRAM(s) Oral daily  gabapentin 300 milliGRAM(s) Oral two times a day  influenza   Vaccine 0.5 milliLiter(s) IntraMuscular once  ketorolac 0.5% Ophthalmic Solution 1 Drop(s) Both EYES daily  latanoprost 0.005% Ophthalmic Solution 1 Drop(s) Both EYES at bedtime  melatonin 6 milliGRAM(s) Oral at bedtime  multivitamin 1 Tablet(s) Oral daily  pancrelipase  (CREON 36,000 Lipase Units) 2 Capsule(s) Oral three times a day with meals  pantoprazole    Tablet 40 milliGRAM(s) Oral before breakfast  pyridoxine 100 milliGRAM(s) Oral daily  sacubitril 24 mG/valsartan 26 mG 1 Tablet(s) Oral two times a day  sotalol 40 milliGRAM(s) Oral every 12 hours  tamsulosin 0.4 milliGRAM(s) Oral at bedtime    MEDICATIONS  (PRN):  acetaminophen   Tablet .. 650 milliGRAM(s) Oral every 4 hours PRN Mild Pain (1 - 3)  ALPRAZolam 0.25 milliGRAM(s) Oral three times a day PRN Anxiety/agitation  oxycodone    5 mG/acetaminophen 325 mG 2 Tablet(s) Oral every 4 hours PRN Severe Pain (7 - 10)  oxycodone    5 mG/acetaminophen 325 mG 1 Tablet(s) Oral every 4 hours PRN Moderate Pain (4 - 6)      CAPILLARY BLOOD GLUCOSE        I&O's Summary    01 Oct 2021 07:01  -  02 Oct 2021 07:00  --------------------------------------------------------  IN: 1118 mL / OUT: 1500 mL / NET: -382 mL    02 Oct 2021 07:01  -  03 Oct 2021 06:58  --------------------------------------------------------  IN: 1530 mL / OUT: 1530 mL / NET: 0 mL        VITALS:   T(C): 36.4 (10-03-21 @ 04:12), Max: 37 (10-02-21 @ 20:35)  HR: 89 (10-03-21 @ 06:05) (82 - 97)  BP: 98/54 (10-03-21 @ 06:05) (76/48 - 101/66)  RR: 18 (10-03-21 @ 04:12) (18 - 18)  SpO2: 96% (10-03-21 @ 04:12) (95% - 96%)    GENERAL: NAD, lying in bed comfortably  HEAD:  Atraumatic, normocephalic  EYES: EOMI, PERRLA, conjunctiva and sclera clear  ENT: Moist mucous membranes  NECK: Supple, no JVD  HEART: Regular rate and rhythm, no murmurs, rubs, or gallops  LUNGS: Unlabored respirations.  Clear to auscultation bilaterally, no crackles, wheezing, or rhonchi  ABDOMEN: Soft, nontender, nondistended, +BS  EXTREMITIES: 2+ peripheral pulses bilaterally. No clubbing, cyanosis, or edema  NERVOUS SYSTEM:  A&Ox3, no focal deficits   SKIN: No rashes or lesions    LABS:                        8.6    6.58  )-----------( 243      ( 02 Oct 2021 06:42 )             28.1     10-02    135  |  102  |  19  ----------------------------<  85  4.0   |  22  |  0.90    Ca    8.5      02 Oct 2021 06:44  Phos  4.1     10-02  Mg     1.6     10-02      PTT - ( 01 Oct 2021 10:48 )  PTT:51.4 sec            RADIOLOGY & ADDITIONAL TESTS:  Results Reviewed:   Imaging Personally Reviewed:  Electrocardiogram Personally Reviewed:    COORDINATION OF CARE:  Care Discussed with Consultants/Other Providers [Y/N]:  Prior or Outpatient Records Reviewed [Y/N]:   Chandler Ann MD  Internal Medicine PGY-1  Pager NS: [phone] // [facility]: [phone]    PROGRESS NOTE:     Patient is a 80y old  Male who presents with a chief complaint of osteomylitis, c/f endocarditis (02 Oct 2021 11:19)      SUBJECTIVE / OVERNIGHT EVENTS:    No acute events overnight. Magnesium low this morning, repleted with 1g Mg sulfate. Patient examined at bedside with no acute complaints. He was eating breakfast without difficulty and reported improved pain 6/10 in his foot and 7/10 in his shoulder. He also reported his foot bandages were changed by the nurse yesterday and denies any discharge from the amputation site. He is aware he will be going for subcutaneous ICD placement tomorrow.     REVIEW OF SYSTEMS:    CONSTITUTIONAL: No weakness, fevers or chills  EYES/ENT: No visual changes;  No vertigo or throat pain   NECK: No pain or stiffness  RESPIRATORY: No cough, wheezing, hemoptysis; No shortness of breath  CARDIOVASCULAR: No chest pain or palpitations  GASTROINTESTINAL: No abdominal or epigastric pain. No nausea, vomiting, or hematemesis; No diarrhea or constipation. No melena or hematochezia.  GENITOURINARY: No dysuria, frequency or hematuria  NEUROLOGICAL: No numbness or weakness  SKIN: No itching, rashes. 7/10 shoulder pain at port removal site, 6/10 pain on R foot at amputation site.      MEDICATIONS  (STANDING):  ceFAZolin   IVPB 2000 milliGRAM(s) IV Intermittent every 8 hours  chlorhexidine 4% Liquid 1 Application(s) Topical <User Schedule>  chlorhexidine 4% Liquid 1 Application(s) Topical <User Schedule>  dorzolamide 2%/timolol 0.5% Ophthalmic Solution 1 Drop(s) Both EYES two times a day  DULoxetine 60 milliGRAM(s) Oral daily  gabapentin 300 milliGRAM(s) Oral two times a day  influenza   Vaccine 0.5 milliLiter(s) IntraMuscular once  ketorolac 0.5% Ophthalmic Solution 1 Drop(s) Both EYES daily  latanoprost 0.005% Ophthalmic Solution 1 Drop(s) Both EYES at bedtime  melatonin 6 milliGRAM(s) Oral at bedtime  multivitamin 1 Tablet(s) Oral daily  pancrelipase  (CREON 36,000 Lipase Units) 2 Capsule(s) Oral three times a day with meals  pantoprazole    Tablet 40 milliGRAM(s) Oral before breakfast  pyridoxine 100 milliGRAM(s) Oral daily  sacubitril 24 mG/valsartan 26 mG 1 Tablet(s) Oral two times a day  sotalol 40 milliGRAM(s) Oral every 12 hours  tamsulosin 0.4 milliGRAM(s) Oral at bedtime    MEDICATIONS  (PRN):  acetaminophen   Tablet .. 650 milliGRAM(s) Oral every 4 hours PRN Mild Pain (1 - 3)  ALPRAZolam 0.25 milliGRAM(s) Oral three times a day PRN Anxiety/agitation  oxycodone    5 mG/acetaminophen 325 mG 2 Tablet(s) Oral every 4 hours PRN Severe Pain (7 - 10)  oxycodone    5 mG/acetaminophen 325 mG 1 Tablet(s) Oral every 4 hours PRN Moderate Pain (4 - 6)      CAPILLARY BLOOD GLUCOSE        I&O's Summary    01 Oct 2021 07:01  -  02 Oct 2021 07:00  --------------------------------------------------------  IN: 1118 mL / OUT: 1500 mL / NET: -382 mL    02 Oct 2021 07:01  -  03 Oct 2021 06:58  --------------------------------------------------------  IN: 1530 mL / OUT: 1530 mL / NET: 0 mL        VITALS:   T(C): 36.4 (10-03-21 @ 04:12), Max: 37 (10-02-21 @ 20:35)  HR: 89 (10-03-21 @ 06:05) (82 - 97)  BP: 98/54 (10-03-21 @ 06:05) (76/48 - 101/66)  RR: 18 (10-03-21 @ 04:12) (18 - 18)  SpO2: 96% (10-03-21 @ 04:12) (95% - 96%)    GENERAL: NAD, lying in bed comfortably  HEAD:  Atraumatic, normocephalic  EYES: EOMI, PERRLA, conjunctiva and sclera clear  ENT: Moist mucous membranes  NECK: Supple, no JVD  HEART: Regular rate and rhythm, no murmurs, rubs, or gallops  LUNGS: Unlabored respirations.  Clear to auscultation bilaterally, no crackles, wheezing, or rhonchi  ABDOMEN: Soft, nontender, nondistended, +BS  EXTREMITIES: 2+ peripheral pulses bilaterally. No clubbing, cyanosis, or edema  NERVOUS SYSTEM:  A&Ox3, no focal deficits   SKIN: No rashes or lesions, no discharge noted from R foot. L shoulder surgical site nonerythematous clean and dry.    LABS:                        8.6    6.58  )-----------( 243      ( 02 Oct 2021 06:42 )             28.1     10-02    135  |  102  |  19  ----------------------------<  85  4.0   |  22  |  0.90    Ca    8.5      02 Oct 2021 06:44  Phos  4.1     10-02  Mg     1.6     10-02      PTT - ( 01 Oct 2021 10:48 )  PTT:51.4 sec    RADIOLOGY & ADDITIONAL TESTS:  Results Reviewed:   Imaging Personally Reviewed:  Electrocardiogram Personally Reviewed:    COORDINATION OF CARE:  Care Discussed with Consultants/Other Providers [Y/N]:  Prior or Outpatient Records Reviewed [Y/N]:

## 2021-10-03 NOTE — PROGRESS NOTE ADULT - PROBLEM SELECTOR PLAN 7
- Diagnosed in March 2021, on chemotherapy (does not recall name of medication)  - Continue home Pancrelipase 36,000 2 tabs PO TID with meals  - Per podiatry, Dr. López spoke with Dr. Murphy at [place]- Whipple to be postponed until after resolution of acute OM infection, but does not need to wait until full course abx completed per ID.   -Pain meds as above  -Dr. Mercer/Surgery consulted for second opinion regarding Whipple. Will obtain CT abd/pelvis.

## 2021-10-03 NOTE — PROGRESS NOTE ADULT - ASSESSMENT
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    Cellulitis of right lower leg and Acute OM of right hallux  - s/p right 1st partial ray resection.  Tolerated procedure well with no obvious cardiac complications  - MSSA bacteremia on admission BCx; repeat BCx from 9/16 are NGTD  - s/p CORBIN in the setting of TAVR and ICD. no vegetations, ef 30-35  - s/p icd extraction and tolerated procedure well  - plan for subq icd next week.   - Abx per ID.  Follow recs      Pulmonary embolism.   - Bilateral PE in 7/2020, on Xarelto   - Xarelto resumed    Chronic systolic HF S/P ICD, MS    - moderate lv dysfxn and moderate ms based on annular calcification  - Continue Entresto   - bp at time soft. would hold lasix for few days.   - No signs of acute volume overload. Tolerating Room air  - Strict I/Os, daily weights  - Monitor and replete Lytes. Keep K > 4 and Mg > 2  - on sotalol for vt in the past    HTN  - continue sotalol with hold parameters  - continue entresto with hold parameters  - bp has been soft.     - Monitor and replete lytes, keep K>4, Mg>2.  - All other medical needs as per primary team.  - Other cardiovascular workup will depend on clinical course.  - Will continue to follow.

## 2021-10-04 DIAGNOSIS — Z01.818 ENCOUNTER FOR OTHER PREPROCEDURAL EXAMINATION: ICD-10-CM

## 2021-10-04 LAB
ANION GAP SERPL CALC-SCNC: 12 MMOL/L — SIGNIFICANT CHANGE UP (ref 5–17)
APTT BLD: 29.4 SEC — SIGNIFICANT CHANGE UP (ref 27.5–35.5)
BUN SERPL-MCNC: 14 MG/DL — SIGNIFICANT CHANGE UP (ref 7–23)
CALCIUM SERPL-MCNC: 9.3 MG/DL — SIGNIFICANT CHANGE UP (ref 8.4–10.5)
CHLORIDE SERPL-SCNC: 101 MMOL/L — SIGNIFICANT CHANGE UP (ref 96–108)
CO2 SERPL-SCNC: 23 MMOL/L — SIGNIFICANT CHANGE UP (ref 22–31)
CREAT SERPL-MCNC: 0.7 MG/DL — SIGNIFICANT CHANGE UP (ref 0.5–1.3)
GLUCOSE SERPL-MCNC: 92 MG/DL — SIGNIFICANT CHANGE UP (ref 70–99)
HCT VFR BLD CALC: 26.6 % — LOW (ref 39–50)
HGB BLD-MCNC: 8.6 G/DL — LOW (ref 13–17)
INR BLD: 1.14 RATIO — SIGNIFICANT CHANGE UP (ref 0.88–1.16)
MAGNESIUM SERPL-MCNC: 1.9 MG/DL — SIGNIFICANT CHANGE UP (ref 1.6–2.6)
MCHC RBC-ENTMCNC: 32.3 GM/DL — SIGNIFICANT CHANGE UP (ref 32–36)
MCHC RBC-ENTMCNC: 33.9 PG — SIGNIFICANT CHANGE UP (ref 27–34)
MCV RBC AUTO: 104.7 FL — HIGH (ref 80–100)
NRBC # BLD: 0 /100 WBCS — SIGNIFICANT CHANGE UP (ref 0–0)
PHOSPHATE SERPL-MCNC: 4.1 MG/DL — SIGNIFICANT CHANGE UP (ref 2.5–4.5)
PLATELET # BLD AUTO: 199 K/UL — SIGNIFICANT CHANGE UP (ref 150–400)
POTASSIUM SERPL-MCNC: 4.6 MMOL/L — SIGNIFICANT CHANGE UP (ref 3.5–5.3)
POTASSIUM SERPL-SCNC: 4.6 MMOL/L — SIGNIFICANT CHANGE UP (ref 3.5–5.3)
PROTHROM AB SERPL-ACNC: 13.6 SEC — SIGNIFICANT CHANGE UP (ref 10.6–13.6)
RBC # BLD: 2.54 M/UL — LOW (ref 4.2–5.8)
RBC # FLD: 15.4 % — HIGH (ref 10.3–14.5)
SODIUM SERPL-SCNC: 136 MMOL/L — SIGNIFICANT CHANGE UP (ref 135–145)
WBC # BLD: 4.87 K/UL — SIGNIFICANT CHANGE UP (ref 3.8–10.5)
WBC # FLD AUTO: 4.87 K/UL — SIGNIFICANT CHANGE UP (ref 3.8–10.5)

## 2021-10-04 PROCEDURE — 99233 SBSQ HOSP IP/OBS HIGH 50: CPT | Mod: 24

## 2021-10-04 PROCEDURE — 99232 SBSQ HOSP IP/OBS MODERATE 35: CPT

## 2021-10-04 PROCEDURE — 99233 SBSQ HOSP IP/OBS HIGH 50: CPT | Mod: GC

## 2021-10-04 PROCEDURE — 93010 ELECTROCARDIOGRAM REPORT: CPT

## 2021-10-04 RX ORDER — ONDANSETRON 8 MG/1
8 TABLET, FILM COATED ORAL ONCE
Refills: 0 | Status: DISCONTINUED | OUTPATIENT
Start: 2021-10-04 | End: 2021-10-05

## 2021-10-04 RX ORDER — HYDROMORPHONE HYDROCHLORIDE 2 MG/ML
0.2 INJECTION INTRAMUSCULAR; INTRAVENOUS; SUBCUTANEOUS
Refills: 0 | Status: DISCONTINUED | OUTPATIENT
Start: 2021-10-04 | End: 2021-10-05

## 2021-10-04 RX ADMIN — OXYCODONE AND ACETAMINOPHEN 2 TABLET(S): 5; 325 TABLET ORAL at 22:18

## 2021-10-04 RX ADMIN — Medication 6 MILLIGRAM(S): at 21:54

## 2021-10-04 RX ADMIN — Medication 100 MILLIGRAM(S): at 14:18

## 2021-10-04 RX ADMIN — PANTOPRAZOLE SODIUM 40 MILLIGRAM(S): 20 TABLET, DELAYED RELEASE ORAL at 06:22

## 2021-10-04 RX ADMIN — Medication 40 MILLIGRAM(S): at 17:01

## 2021-10-04 RX ADMIN — CHLORHEXIDINE GLUCONATE 1 APPLICATION(S): 213 SOLUTION TOPICAL at 05:23

## 2021-10-04 RX ADMIN — OXYCODONE AND ACETAMINOPHEN 1 TABLET(S): 5; 325 TABLET ORAL at 14:17

## 2021-10-04 RX ADMIN — Medication 100 MILLIGRAM(S): at 06:22

## 2021-10-04 RX ADMIN — DULOXETINE HYDROCHLORIDE 60 MILLIGRAM(S): 30 CAPSULE, DELAYED RELEASE ORAL at 14:17

## 2021-10-04 RX ADMIN — GABAPENTIN 300 MILLIGRAM(S): 400 CAPSULE ORAL at 18:44

## 2021-10-04 RX ADMIN — LATANOPROST 1 DROP(S): 0.05 SOLUTION/ DROPS OPHTHALMIC; TOPICAL at 21:53

## 2021-10-04 RX ADMIN — Medication 100 MILLIGRAM(S): at 22:16

## 2021-10-04 RX ADMIN — Medication 2 CAPSULE(S): at 17:01

## 2021-10-04 RX ADMIN — OXYCODONE AND ACETAMINOPHEN 1 TABLET(S): 5; 325 TABLET ORAL at 15:15

## 2021-10-04 RX ADMIN — Medication 1 DROP(S): at 14:19

## 2021-10-04 RX ADMIN — TAMSULOSIN HYDROCHLORIDE 0.4 MILLIGRAM(S): 0.4 CAPSULE ORAL at 21:54

## 2021-10-04 RX ADMIN — OXYCODONE AND ACETAMINOPHEN 2 TABLET(S): 5; 325 TABLET ORAL at 22:48

## 2021-10-04 RX ADMIN — Medication 1 TABLET(S): at 14:18

## 2021-10-04 RX ADMIN — GABAPENTIN 300 MILLIGRAM(S): 400 CAPSULE ORAL at 06:22

## 2021-10-04 RX ADMIN — Medication 0.25 MILLIGRAM(S): at 21:54

## 2021-10-04 RX ADMIN — OXYCODONE AND ACETAMINOPHEN 1 TABLET(S): 5; 325 TABLET ORAL at 20:00

## 2021-10-04 RX ADMIN — DORZOLAMIDE HYDROCHLORIDE TIMOLOL MALEATE 1 DROP(S): 20; 5 SOLUTION/ DROPS OPHTHALMIC at 17:02

## 2021-10-04 RX ADMIN — SACUBITRIL AND VALSARTAN 1 TABLET(S): 24; 26 TABLET, FILM COATED ORAL at 17:01

## 2021-10-04 RX ADMIN — Medication 40 MILLIGRAM(S): at 07:39

## 2021-10-04 RX ADMIN — OXYCODONE AND ACETAMINOPHEN 1 TABLET(S): 5; 325 TABLET ORAL at 20:30

## 2021-10-04 NOTE — PROGRESS NOTE ADULT - SUBJECTIVE AND OBJECTIVE BOX
INCOMPLETE NOTE PROGRESS NOTE:     Patient is a 80y old  Male who presents with a chief complaint of osteomylitis, c/f endocarditis (04 Oct 2021 16:01)      SUBJECTIVE / OVERNIGHT EVENTS:  MEME. Received SQ ICD.  No foot pain, chest pain, symptomatic palpitations    ADDITIONAL REVIEW OF SYSTEMS:  No fevers, chest pain, shortness of breath, abdominal pain, lower extremity swelling or pain, dysuria, or constipation.    MEDICATIONS  (STANDING):  ceFAZolin   IVPB 2000 milliGRAM(s) IV Intermittent every 8 hours  chlorhexidine 4% Liquid 1 Application(s) Topical <User Schedule>  dorzolamide 2%/timolol 0.5% Ophthalmic Solution 1 Drop(s) Both EYES two times a day  DULoxetine 60 milliGRAM(s) Oral daily  gabapentin 300 milliGRAM(s) Oral two times a day  influenza   Vaccine 0.5 milliLiter(s) IntraMuscular once  ketorolac 0.5% Ophthalmic Solution 1 Drop(s) Both EYES daily  latanoprost 0.005% Ophthalmic Solution 1 Drop(s) Both EYES at bedtime  melatonin 6 milliGRAM(s) Oral at bedtime  multivitamin 1 Tablet(s) Oral daily  pancrelipase  (CREON 36,000 Lipase Units) 2 Capsule(s) Oral three times a day with meals  pantoprazole    Tablet 40 milliGRAM(s) Oral before breakfast  pyridoxine 100 milliGRAM(s) Oral daily  sacubitril 24 mG/valsartan 26 mG 1 Tablet(s) Oral two times a day  sotalol 40 milliGRAM(s) Oral every 12 hours  tamsulosin 0.4 milliGRAM(s) Oral at bedtime    MEDICATIONS  (PRN):  acetaminophen   Tablet .. 650 milliGRAM(s) Oral every 4 hours PRN Mild Pain (1 - 3)  ALPRAZolam 0.25 milliGRAM(s) Oral three times a day PRN Anxiety/agitation  HYDROmorphone  Injectable 0.2 milliGRAM(s) IV Push every 10 minutes PRN Moderate Pain (4 - 6)  ondansetron Injectable 8 milliGRAM(s) IV Push once PRN Nausea and/or Vomiting  oxycodone    5 mG/acetaminophen 325 mG 1 Tablet(s) Oral every 4 hours PRN Moderate Pain (4 - 6)  oxycodone    5 mG/acetaminophen 325 mG 2 Tablet(s) Oral every 4 hours PRN Severe Pain (7 - 10)      CAPILLARY BLOOD GLUCOSE        I&O's Summary    03 Oct 2021 07:01  -  04 Oct 2021 07:00  --------------------------------------------------------  IN: 850 mL / OUT: 3000 mL / NET: -2150 mL    04 Oct 2021 07:01  -  04 Oct 2021 19:18  --------------------------------------------------------  IN: 0 mL / OUT: 0 mL / NET: 0 mL        PHYSICAL EXAM:  Vital Signs Last 24 Hrs  T(C): 36.8 (04 Oct 2021 16:15), Max: 37 (04 Oct 2021 07:37)  T(F): 98.3 (04 Oct 2021 16:15), Max: 98.6 (04 Oct 2021 07:37)  HR: 81 (04 Oct 2021 17:28) (70 - 85)  BP: 126/69 (04 Oct 2021 17:28) (92/55 - 151/73)  BP(mean): 70 (04 Oct 2021 12:30) (70 - 70)  RR: 18 (04 Oct 2021 16:15) (16 - 18)  SpO2: 94% (04 Oct 2021 16:15) (94% - 99%)    Before procedure:  CONSTITUTIONAL: NAD, well-developed  CARDIOVASCULAR: Regular rate and rhythm. Normal S1 and S2. No murmurs.  RESPIRATORY: Normal respiratory effort, Lungs CTAB  EXTREMITIES: No IZZY, peripheral pulses intact. L  chest and R chest wounds CDI from lead extraction and mediport extraction. Foot wound dressing CDI, no signs of bleeding  ABDOMEN: Nontender to palpation, normoactive bowel sounds, no rebound/guarding; No hepatosplenomegaly  MUSCLOSKELETAL: no clubbing or cyanosis of digits; no joint swelling or tenderness to palpation  PSYCH: A+O to person, place, and time; affect appropriate    LABS:                        8.6    4.87  )-----------( 199      ( 04 Oct 2021 07:03 )             26.6     10-04    136  |  101  |  14  ----------------------------<  92  4.6   |  23  |  0.70    Ca    9.3      04 Oct 2021 07:02  Phos  4.1     10-04  Mg     1.9     10-04      PT/INR - ( 04 Oct 2021 07:03 )   PT: 13.6 sec;   INR: 1.14 ratio         PTT - ( 04 Oct 2021 07:03 )  PTT:29.4 sec    4 beats NSVT on telemetry        RADIOLOGY & ADDITIONAL TESTS:  Results Reviewed: Stable, hgb and wbc noted.  Imaging Personally Reviewed:  Electrocardiogram Personally Reviewed:    COORDINATION OF CARE:  Care Discussed with Consultants/Other Providers [Y/N]:  Prior or Outpatient Records Reviewed [Y/N]:

## 2021-10-04 NOTE — PROGRESS NOTE ADULT - PROBLEM SELECTOR PLAN 10
1. Name of PCP:  2. PCP contacted on admission: [  ] Y   [  ] N  3. PCP contacted at Discharge: [  ] Y   [  ] N  4. Post-Discharge Appointment Date and Location:   5 Summary of Handoff given to PCP:

## 2021-10-04 NOTE — PROGRESS NOTE ADULT - SUBJECTIVE AND OBJECTIVE BOX
24H hour events: Tele overnight:     MEDICATIONS:  sacubitril 24 mG/valsartan 26 mG 1 Tablet(s) Oral two times a day  sotalol 40 milliGRAM(s) Oral every 12 hours  tamsulosin 0.4 milliGRAM(s) Oral at bedtime    ceFAZolin   IVPB 2000 milliGRAM(s) IV Intermittent every 8 hours      acetaminophen   Tablet .. 650 milliGRAM(s) Oral every 4 hours PRN  ALPRAZolam 0.25 milliGRAM(s) Oral three times a day PRN  DULoxetine 60 milliGRAM(s) Oral daily  gabapentin 300 milliGRAM(s) Oral two times a day  HYDROmorphone  Injectable 0.2 milliGRAM(s) IV Push every 10 minutes PRN  melatonin 6 milliGRAM(s) Oral at bedtime  ondansetron Injectable 8 milliGRAM(s) IV Push once PRN  oxycodone    5 mG/acetaminophen 325 mG 1 Tablet(s) Oral every 4 hours PRN  oxycodone    5 mG/acetaminophen 325 mG 2 Tablet(s) Oral every 4 hours PRN    pancrelipase  (CREON 36,000 Lipase Units) 2 Capsule(s) Oral three times a day with meals  pantoprazole    Tablet 40 milliGRAM(s) Oral before breakfast      chlorhexidine 4% Liquid 1 Application(s) Topical <User Schedule>  dorzolamide 2%/timolol 0.5% Ophthalmic Solution 1 Drop(s) Both EYES two times a day  influenza   Vaccine 0.5 milliLiter(s) IntraMuscular once  ketorolac 0.5% Ophthalmic Solution 1 Drop(s) Both EYES daily  latanoprost 0.005% Ophthalmic Solution 1 Drop(s) Both EYES at bedtime  multivitamin 1 Tablet(s) Oral daily  pyridoxine 100 milliGRAM(s) Oral daily      REVIEW OF SYSTEMS:  Complete 10point ROS negative.    PHYSICAL EXAM:  T(C): 36.6 (10-04-21 @ 14:15), Max: 37 (10-04-21 @ 07:37)  HR: 76 (10-04-21 @ 14:15) (70 - 86)  BP: 120/81 (10-04-21 @ 14:15) (92/55 - 151/73)  RR: 18 (10-04-21 @ 14:15) (16 - 18)  SpO2: 98% (10-04-21 @ 14:15) (95% - 99%)  Wt(kg): --  I&O's Summary    03 Oct 2021 07:01  -  04 Oct 2021 07:00  --------------------------------------------------------  IN: 850 mL / OUT: 3000 mL / NET: -2150 mL    04 Oct 2021 07:01  -  04 Oct 2021 14:43  --------------------------------------------------------  IN: 0 mL / OUT: 0 mL / NET: 0 mL        Appearance: Normal	  HEENT:   Normal oral mucosa, PERRL, EOMI	  Lymphatic: No lymphadenopathy  Cardiovascular: Normal S1 S2, No JVD, No murmurs, No edema  Respiratory: Lungs clear to auscultation	  Psychiatry: A & O x 3, Mood & affect appropriate  Gastrointestinal:  Soft, Non-tender, + BS	  Skin: No rashes, No ecchymoses, No cyanosis	  Neurologic: Non-focal  Extremities: Normal range of motion, No clubbing, cyanosis or edema  Vascular: Peripheral pulses palpable 2+ bilaterally        LABS:	 	    CBC Full  -  ( 04 Oct 2021 07:03 )  WBC Count : 4.87 K/uL  Hemoglobin : 8.6 g/dL  Hematocrit : 26.6 %  Platelet Count - Automated : 199 K/uL  Mean Cell Volume : 104.7 fl  Mean Cell Hemoglobin : 33.9 pg  Mean Cell Hemoglobin Concentration : 32.3 gm/dL  Auto Neutrophil # : x  Auto Lymphocyte # : x  Auto Monocyte # : x  Auto Eosinophil # : x  Auto Basophil # : x  Auto Neutrophil % : x  Auto Lymphocyte % : x  Auto Monocyte % : x  Auto Eosinophil % : x  Auto Basophil % : x    10-04    136  |  101  |  14  ----------------------------<  92  4.6   |  23  |  0.70  10-03    135  |  102  |  17  ----------------------------<  103<H>  4.5   |  24  |  0.77    Ca    9.3      04 Oct 2021 07:02  Ca    9.3      03 Oct 2021 07:12  Phos  4.1     10-04  Phos  4.1     10-03  Mg     1.9     10-04  Mg     1.9     10-03        proBNP:   Lipid Profile:   HgA1c:   TSH:       CARDIAC MARKERS:          TELEMETRY: 	    ECG:  	  RADIOLOGY:  OTHER: 	    PREVIOUS DIAGNOSTIC TESTING:    [ ] Echocardiogram:    [ ]  Catheterization:  [ ] Stress Test:  	  	  ASSESSMENT/PLAN: 	     24H hour events: Tele overnight: sinus rhythm 70-90s. 1 episode 9 beats WCT, 6.4 secs paroxysmal AT overnight. Pt is s/p S-ICD today. Feeling well. Denies ***    MEDICATIONS:  sacubitril 24 mG/valsartan 26 mG 1 Tablet(s) Oral two times a day  sotalol 40 milliGRAM(s) Oral every 12 hours  tamsulosin 0.4 milliGRAM(s) Oral at bedtime  ceFAZolin   IVPB 2000 milliGRAM(s) IV Intermittent every 8 hours  acetaminophen   Tablet .. 650 milliGRAM(s) Oral every 4 hours PRN  ALPRAZolam 0.25 milliGRAM(s) Oral three times a day PRN  DULoxetine 60 milliGRAM(s) Oral daily  gabapentin 300 milliGRAM(s) Oral two times a day  HYDROmorphone  Injectable 0.2 milliGRAM(s) IV Push every 10 minutes PRN  melatonin 6 milliGRAM(s) Oral at bedtime  ondansetron Injectable 8 milliGRAM(s) IV Push once PRN  oxycodone    5 mG/acetaminophen 325 mG 1 Tablet(s) Oral every 4 hours PRN  oxycodone    5 mG/acetaminophen 325 mG 2 Tablet(s) Oral every 4 hours PRN  pancrelipase  (CREON 36,000 Lipase Units) 2 Capsule(s) Oral three times a day with meals  pantoprazole    Tablet 40 milliGRAM(s) Oral before breakfast  chlorhexidine 4% Liquid 1 Application(s) Topical <User Schedule>  dorzolamide 2%/timolol 0.5% Ophthalmic Solution 1 Drop(s) Both EYES two times a day  influenza   Vaccine 0.5 milliLiter(s) IntraMuscular once  ketorolac 0.5% Ophthalmic Solution 1 Drop(s) Both EYES daily  latanoprost 0.005% Ophthalmic Solution 1 Drop(s) Both EYES at bedtime  multivitamin 1 Tablet(s) Oral daily  pyridoxine 100 milliGRAM(s) Oral daily      REVIEW OF SYSTEMS:  Complete 10point ROS negative.    PHYSICAL EXAM:  T(C): 36.6 (10-04-21 @ 14:15), Max: 37 (10-04-21 @ 07:37)  HR: 76 (10-04-21 @ 14:15) (70 - 86)  BP: 120/81 (10-04-21 @ 14:15) (92/55 - 151/73)  RR: 18 (10-04-21 @ 14:15) (16 - 18)  SpO2: 98% (10-04-21 @ 14:15) (95% - 99%)  Wt(kg): --  I&O's Summary    03 Oct 2021 07:01  -  04 Oct 2021 07:00  --------------------------------------------------------  IN: 850 mL / OUT: 3000 mL / NET: -2150 mL    04 Oct 2021 07:01  -  04 Oct 2021 14:43  --------------------------------------------------------  IN: 0 mL / OUT: 0 mL / NET: 0 mL        Appearance: Normal	  HEENT:   Normal oral mucosa, PERRL, EOMI	  Cardiovascular: Normal S1 S2, No JVD, No murmurs, No edema  Respiratory: Lungs clear to auscultation	  Psychiatry: A & O x 3, Mood & affect appropriate  Gastrointestinal:  Soft, Non-tender  Skin: No rashes, No ecchymoses, No cyanosis	  Neurologic: Non-focal  Extremities: Normal range of motion, No clubbing, cyanosis or edema  Vascular: Peripheral pulses palpable 2+ bilaterally        LABS:	 	    CBC Full  -  ( 04 Oct 2021 07:03 )  WBC Count : 4.87 K/uL  Hemoglobin : 8.6 g/dL  Hematocrit : 26.6 %  Platelet Count - Automated : 199 K/uL  Mean Cell Volume : 104.7 fl  Mean Cell Hemoglobin : 33.9 pg  Mean Cell Hemoglobin Concentration : 32.3 gm/dL      10-04    136  |  101  |  14  ----------------------------<  92  4.6   |  23  |  0.70  10-03    135  |  102  |  17  ----------------------------<  103<H>  4.5   |  24  |  0.77    Ca    9.3      04 Oct 2021 07:02  Ca    9.3      03 Oct 2021 07:12  Phos  4.1     10-04  Phos  4.1     10-03  Mg     1.9     10-04  Mg     1.9     10-03      proBNP:   Lipid Profile:   HgA1c:   TSH:       CARDIAC MARKERS:          TELEMETRY: 	    ECG:  	  RADIOLOGY:  OTHER: 	    PREVIOUS DIAGNOSTIC TESTING:    [ ] Echocardiogram:    [ ]  Catheterization:  [ ] Stress Test:  	  	  ASSESSMENT/PLAN: 	     24H hour events: Tele overnight: sinus rhythm 70-90s. 1 episode 9 beats WCT,~ 6.4 secs paroxysmal AT overnight. Pt is s/p S-ICD today. Feeling well. Denies palpitations chest pain, SOB    MEDICATIONS:  sacubitril 24 mG/valsartan 26 mG 1 Tablet(s) Oral two times a day  sotalol 40 milliGRAM(s) Oral every 12 hours  tamsulosin 0.4 milliGRAM(s) Oral at bedtime  ceFAZolin   IVPB 2000 milliGRAM(s) IV Intermittent every 8 hours  acetaminophen   Tablet .. 650 milliGRAM(s) Oral every 4 hours PRN  ALPRAZolam 0.25 milliGRAM(s) Oral three times a day PRN  DULoxetine 60 milliGRAM(s) Oral daily  gabapentin 300 milliGRAM(s) Oral two times a day  HYDROmorphone  Injectable 0.2 milliGRAM(s) IV Push every 10 minutes PRN  melatonin 6 milliGRAM(s) Oral at bedtime  ondansetron Injectable 8 milliGRAM(s) IV Push once PRN  oxycodone    5 mG/acetaminophen 325 mG 1 Tablet(s) Oral every 4 hours PRN  oxycodone    5 mG/acetaminophen 325 mG 2 Tablet(s) Oral every 4 hours PRN  pancrelipase  (CREON 36,000 Lipase Units) 2 Capsule(s) Oral three times a day with meals  pantoprazole    Tablet 40 milliGRAM(s) Oral before breakfast  chlorhexidine 4% Liquid 1 Application(s) Topical <User Schedule>  dorzolamide 2%/timolol 0.5% Ophthalmic Solution 1 Drop(s) Both EYES two times a day  influenza   Vaccine 0.5 milliLiter(s) IntraMuscular once  ketorolac 0.5% Ophthalmic Solution 1 Drop(s) Both EYES daily  latanoprost 0.005% Ophthalmic Solution 1 Drop(s) Both EYES at bedtime  multivitamin 1 Tablet(s) Oral daily  pyridoxine 100 milliGRAM(s) Oral daily      REVIEW OF SYSTEMS:  Complete 10point ROS negative.    PHYSICAL EXAM:  T(C): 36.6 (10-04-21 @ 14:15), Max: 37 (10-04-21 @ 07:37)  HR: 76 (10-04-21 @ 14:15) (70 - 86)  BP: 120/81 (10-04-21 @ 14:15) (92/55 - 151/73)  RR: 18 (10-04-21 @ 14:15) (16 - 18)  SpO2: 98% (10-04-21 @ 14:15) (95% - 99%)  Wt(kg): --  I&O's Summary    03 Oct 2021 07:01  -  04 Oct 2021 07:00  --------------------------------------------------------  IN: 850 mL / OUT: 3000 mL / NET: -2150 mL    04 Oct 2021 07:01  -  04 Oct 2021 14:43  --------------------------------------------------------  IN: 0 mL / OUT: 0 mL / NET: 0 mL        Appearance: Normal	  HEENT: Atraumatic  Cardiovascular: Normal S1 S2, No JVD, No murmurs  Respiratory: Lungs clear to auscultation. No wheezes/rales  Psychiatry: A & O x 3, Mood & affect appropriate  Gastrointestinal:  Soft, Non-tender  Skin: Dressing in place. C/D/I. 	  Neurologic: Non-focal  Extremities: RLE wrapped. LLE no edema.  Vascular: Peripheral pulses palpable 2+ bilaterally        LABS:	 	    CBC Full  -  ( 04 Oct 2021 07:03 )  WBC Count : 4.87 K/uL  Hemoglobin : 8.6 g/dL  Hematocrit : 26.6 %  Platelet Count - Automated : 199 K/uL  Mean Cell Volume : 104.7 fl  Mean Cell Hemoglobin : 33.9 pg  Mean Cell Hemoglobin Concentration : 32.3 gm/dL      10-04    136  |  101  |  14  ----------------------------<  92  4.6   |  23  |  0.70  10-03    135  |  102  |  17  ----------------------------<  103<H>  4.5   |  24  |  0.77    Ca    9.3      04 Oct 2021 07:02  Ca    9.3      03 Oct 2021 07:12  Phos  4.1     10-04  Phos  4.1     10-03  Mg     1.9     10-04  Mg     1.9     10-03        TELEMETRY: Sinus 70-90s. 6 beats WCT, ~6.4 secs of paroxysmal AT.    PREVIOUS DIAGNOSTIC TESTING:    [ ] Echocardiogram: < from: Transesophageal Echocardiogram w/o TTE (09.27.21 @ 09:25) >  Observations:  Mitral Valve: Severe mitral annular and leaflet  calcification.  Moderate mitral stenosis. Mean gradient 5.8 mmHg at  68/min.  Mild-moderate mitral regurgitation.  Aortic Valve/Aorta: s/p transcatheter aortic valve  replacement. Leaflet motion is normal.  No aortic valvular or paravalvular regurgitation seen.  Normal size aortic root, arch, and descending thoracic  aorta.  Mild atherosclerosis. No mobile plaque.  Left Atrium: Severe left atrial enlargement.  Left Ventricle: Moderate left ventricular enlargement.  Estimated ejection fraction 30-35%.  Right Heart: Moderate right atrial enlargement. Prominent  Chiari network. Normal right ventricular size and function.    A device wire is noted in the right heart.  Normal tricuspid valve. Mild tricuspid regurgitation.  Normal pulmonic valve. Minimal pulmonic regurgitation.  Pericardium/Pleura: Normal pericardium with no pericardial  effusion.  Hemodynamic: Mild pulmonary hypertension.  Color Doppler doemonstratesthe presence of a patent  foramen ovale.  ------------------------------------------------------------------------  Conclusions:  Moderate left ventricular enlargement. Estimated ejection  fraction 30-35%.  Moderate mitral stenosis due to annular andleaflet  calcification. Mild-moderate mitral regurgitation.  s/p transcatheter aortic valve replacement. Leaflet motion  is normal. No aortic valvular or paravalvular regurgitation  seen.  A device wire is noted in the right heart.  No vegetations seen.    < end of copied text >

## 2021-10-04 NOTE — PACU DISCHARGE NOTE - PAIN:
Heads up for potential admission given to MARÍA ELENA Frazier. Leda LIVINGSTON will take admission.    Julieta Reece MSW, APSW    
Controlled with current regime

## 2021-10-04 NOTE — PROGRESS NOTE ADULT - ATTENDING COMMENTS
above plans discussed with Dr. Mckay    # MSSA bacteremia  # RLE cellulitis/OM of hallux  # pancreatic CA  # concern for ICD infection  # chronic HFrEF  # PE/DVT on xarelto    - CORBIN with no clear vegetation but given high risk s/p ICD extraction  - s/p mediport removal as well  - continue IV ancef via PICC line (will not need total 4 week course); ID consulted - appreciate ID recs   - appreciate EP recs: plan for subcutaneous ICD placement today  - Dr. Mecrer (surg/onc) consulted for second opinion: CTAP reviewed with the patient and fu as outpatient  - care discussed with Dr. Hernandez for cardiac clearance  - IR consulted for removal of mediport; no plan for chemoRx for now  - no signs of volume overload at this time; continue home dose lasix 20mg daily  - resumed on xarelto  - PT consult: TRINA Feng MD  Division of Hospital Medicine  Cell: [phone]  Office: [phone]

## 2021-10-04 NOTE — PROGRESS NOTE ADULT - SUBJECTIVE AND OBJECTIVE BOX
[facility] - Milo Thomas, Adolfo, Binta, Brittanie, Javier Don  Office Number:  [phone]    Patient resting comfortably in bed in NAD.  Laying flat with no respiratory distress.  No complaints of chest pain, dyspnea, palpitations, PND, or orthopnea.  bp has been borderline, and entresto held    ROS: negative unless otherwise mentioned.    Telemetry:  sr, 6 s pat    MEDICATIONS  (STANDING):  ceFAZolin   IVPB 2000 milliGRAM(s) IV Intermittent every 8 hours  chlorhexidine 4% Liquid 1 Application(s) Topical <User Schedule>  dorzolamide 2%/timolol 0.5% Ophthalmic Solution 1 Drop(s) Both EYES two times a day  DULoxetine 60 milliGRAM(s) Oral daily  gabapentin 300 milliGRAM(s) Oral two times a day  influenza   Vaccine 0.5 milliLiter(s) IntraMuscular once  ketorolac 0.5% Ophthalmic Solution 1 Drop(s) Both EYES daily  latanoprost 0.005% Ophthalmic Solution 1 Drop(s) Both EYES at bedtime  melatonin 6 milliGRAM(s) Oral at bedtime  multivitamin 1 Tablet(s) Oral daily  pancrelipase  (CREON 36,000 Lipase Units) 2 Capsule(s) Oral three times a day with meals  pantoprazole    Tablet 40 milliGRAM(s) Oral before breakfast  pyridoxine 100 milliGRAM(s) Oral daily  sacubitril 24 mG/valsartan 26 mG 1 Tablet(s) Oral two times a day  sotalol 40 milliGRAM(s) Oral every 12 hours  tamsulosin 0.4 milliGRAM(s) Oral at bedtime    MEDICATIONS  (PRN):  acetaminophen   Tablet .. 650 milliGRAM(s) Oral every 4 hours PRN Mild Pain (1 - 3)  ALPRAZolam 0.25 milliGRAM(s) Oral three times a day PRN Anxiety/agitation  oxycodone    5 mG/acetaminophen 325 mG 1 Tablet(s) Oral every 4 hours PRN Moderate Pain (4 - 6)  oxycodone    5 mG/acetaminophen 325 mG 2 Tablet(s) Oral every 4 hours PRN Severe Pain (7 - 10)      Allergies    No Known Allergies    Intolerances        Vital Signs Last 24 Hrs  T(C): 37 (04 Oct 2021 07:37), Max: 37 (04 Oct 2021 07:37)  T(F): 98.6 (04 Oct 2021 07:37), Max: 98.6 (04 Oct 2021 07:37)  HR: 74 (04 Oct 2021 07:37) (74 - 86)  BP: 97/66 (04 Oct 2021 07:37) (92/55 - 103/66)  BP(mean): --  RR: 18 (04 Oct 2021 07:37) (18 - 18)  SpO2: 96% (04 Oct 2021 07:37) (95% - 97%)    I&O's Summary    03 Oct 2021 07:01  -  04 Oct 2021 07:00  --------------------------------------------------------  IN: 850 mL / OUT: 3000 mL / NET: -2150 mL        ON EXAM:    Constitutional: NAD, awake and alert, well-developed  HEENT: Moist Mucous Membranes, Anicteric  Pulmonary: Non-labored, breath sounds are clear bilaterally, No wheezing, rales or rhonchi  Cardiovascular: Regular, S1 and S2, No murmurs, rubs, gallops or clicks  Gastrointestinal: Bowel Sounds present, soft, nontender.   Lymph: No peripheral edema. No lymphadenopathy.  Skin: No visible rashes or ulcers.  Psych:  Mood & affect appropriate for situation    LABS: All Labs Reviewed:                        8.6    4.87  )-----------( 199      ( 04 Oct 2021 07:03 )             26.6                         8.7    6.53  )-----------( 212      ( 03 Oct 2021 07:16 )             27.8                         8.6    6.58  )-----------( 243      ( 02 Oct 2021 06:42 )             28.1     04 Oct 2021 07:02    136    |  101    |  14     ----------------------------<  92     4.6     |  23     |  0.70   03 Oct 2021 07:12    135    |  102    |  17     ----------------------------<  103    4.5     |  24     |  0.77   02 Oct 2021 06:44    135    |  102    |  19     ----------------------------<  85     4.0     |  22     |  0.90     Ca    9.3        04 Oct 2021 07:02  Ca    9.3        03 Oct 2021 07:12  Ca    8.5        02 Oct 2021 06:44  Phos  4.1       04 Oct 2021 07:02  Phos  4.1       03 Oct 2021 07:12  Phos  4.1       02 Oct 2021 06:44  Mg     1.9       04 Oct 2021 07:02  Mg     1.9       03 Oct 2021 07:12  Mg     1.6       02 Oct 2021 06:44            Blood Culture:

## 2021-10-04 NOTE — PROGRESS NOTE ADULT - ASSESSMENT
80M PMH of pancreatic cancer (3/2021), HTN, HLD, CHF, CAD s/p stents, ICD, TAVR, b/l PE, spinal stenosis s/p dylon placement, GERD, BPH, L. hip replacement x3, presenting with R. hallux OM s/p amputation.     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   s/p R hallux amputation on 9/20  Operative Cultures with CoNS and Pseudomonas but pathology clear  Unclear if these cultures are dirty bone or clean bone  However, in light of negative path and stable exam would not cover for the Pseudomonas for now    Concerning context for MSSA Bacteremia given mediport, AICD and TAVR  CORBIN without vegetations  s/p AICD removal 9/28  s/p mediport removal 9/30  s/p reimplantation of ICD 10/4    Plan to treat for minimum of 4 weeks with Cefazolin from negative blood cultures (9/16 --> 10/14/21)  Will treat with Keflex for 2 weeks following IV antibiotics out of overabundance of caution given prosthetic valve (10/15 --> 10/28/21)  Will require surveillance blood cultures 2 weeks after antibiotic completion    Overall, MSSA Bacteremia, Toe OM, Positive Culture Finding (Pseudomonas), AICD in place, Mediport in place, s/p TAVR    --Continue Cefazolin 2g IV Q8H (End Date: 10/14/21)  --After his Cefazolin recommend Keflex 500 mg PO Q6H (End Date: 10/28/21)  --On IV Antibiotics recommend CBC with diff and CMP weekly faxed to [phone] (Attn: Dr Andres)  --Recommend followup with me within 3-4 weeks after discharge (he will require surveillance blood cultures 2 weeks after antibiotic completion)  -Given cleared repeat BCx, negative CORBIN and stable clinical status would have no absolute ID contraindication for proceeding with Whipple procedure (called and discussed case with Surgical Oncologist Dr. Shon Murphy)  -Continue to follow CBC with diff  -Continue to follow temperature curve    I will follow the patient as needed. Please feel free to contact me with any further questions or concerns.    Sanjay Andres M.D.  [facility] Division of Infectious Disease  8AM-5PM: Pager Number [phone]  After Hours (or if no response): Please contact the Infectious Diseases Office at [phone]

## 2021-10-04 NOTE — PRE-ANESTHESIA EVALUATION ADULT - NSANTHOSAYNRD_GEN_A_CORE
No. DONALD screening performed.  STOP BANG Legend: 0-2 = LOW Risk; 3-4 = INTERMEDIATE Risk; 5-8 = HIGH Risk

## 2021-10-04 NOTE — PROGRESS NOTE ADULT - PROBLEM SELECTOR PLAN 8
- Patient with complaints of coughing/choking on food on 9/18  - S/S consulted at OSH, recs dysphagia 3 soft-thin liquids. Reconsulted at [facility] and recommended pt be restarted on normal diet.  -Aspiration precautions

## 2021-10-04 NOTE — PRE-ANESTHESIA EVALUATION ADULT - LAST ECHOCARDIOGRAM
LVEF 30%, preserved RV function, mod MS, mild-mod MR, mild TR, TAVR valve present, well seated with no AI

## 2021-10-04 NOTE — PROGRESS NOTE ADULT - ASSESSMENT
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    Cellulitis of right lower leg and Acute OM of right hallux  - s/p right 1st partial ray resection.  Tolerated procedure well with no obvious cardiac complications  - MSSA bacteremia on admission BCx; repeat BCx from 9/16 are NGTD  - s/p CORBIN in the setting of TAVR and ICD. no vegetations, ef 30-35  - s/p icd extraction and tolerated procedure well  - plan for subq icd today  - Abx per ID.  Follow recs    Pulmonary embolism.   - Bilateral PE in 7/2020, on Xarelto   - Xarelto to be resumed post subq icd    Chronic systolic HF S/P ICD, MS    - moderate lv dysfxn and moderate ms based on annular calcification  - Continue Entresto with hold parameters  - bp at time soft. cont to hold lasix  - No signs of acute volume overload. Tolerating Room air  - Strict I/Os, daily weights  - Monitor and replete Lytes. Keep K > 4 and Mg > 2  - on sotalol for vt in the past    HTN  - continue sotalol with hold parameters  - continue entresto with hold parameters  - bp has been soft.     - Monitor and replete lytes, keep K>4, Mg>2.  - All other medical needs as per primary team.  - Other cardiovascular workup will depend on clinical course.  - Will continue to follow.

## 2021-10-04 NOTE — PROGRESS NOTE ADULT - PROBLEM SELECTOR PLAN 1
With RLE cellulitis  - Initial blood cultures 9/15 positive for MSSA bacteremia  - Repeat BCx from 9/16 NGTD  - S/p R. hallux amputation 9/20  Surgical pathology of right hallux positive for acute osteomyelitis, MSSA   - Continue cefazolin 2g q8h per ID recs at [facility]. Plan is to continue 4-6 weeks abx depending on result of CORBIN (as late as 10/27)  - S/P PICC line placement 9/22 RUE  - Pain well controlled w/ current regimen   - S/p RRT 9/23 for ruptured hematoma @ surgical site during PT, wound irrigated and packed, left open to heal by secondary intention  - Outpatient podiatrist dr Felipe. Podiatry reconsulted. Will f/u recs.   - Cardiology/EP following. CORBIN performed 9/27 without evidence of vegetations.   -ICD extraction performed 9/28 without complications.   -Id following. Will appreciate further recs.   -S/P mediport removal 9/30 MSSA osteomyelitis s/p R halux amputation with MSSA bacteremia  -Cefazoline 2 g q8h until 10/14 to then be followed by keflex for additional 2 weeks 10/15-10/28  - Repeat BCx from 9/16 NGTD  - S/P PICC line placement 9/22 RUE  - Outpatient podiatrist dr Felipe. Podiatry reconsulted. Wound care recs ordered.  -ICD extraction performed 9/28 without complications.  -S/P mediport removal 9/30  -APpreciate ID

## 2021-10-04 NOTE — PRE-ANESTHESIA EVALUATION ADULT - NSANTHPMHFT_GEN_ALL_CORE
viral myocarditis s/p AICD, bacteremia s/p explant  HTN  HLD   controlld GERD
80M PMH CAD s/p stents, AICD, pancreatic CA, HTN, HLD, GERD, TAVR, B/L PE on xarelto, severe MS, spinal stenosis p/w osteomylelitis of toe
HFrEF 30%, preserved RV function, mod MS, mild-mod MR, HTN, HLD

## 2021-10-04 NOTE — PROGRESS NOTE ADULT - PROBLEM SELECTOR PLAN 9
VTE- Xarelto, held 10/3 prior to ICD placement.  Diet- DASH VTE- Xarelto, held 10/3 prior to ICD placement. To restart 10/6  Diet- DASH

## 2021-10-04 NOTE — PROGRESS NOTE ADULT - PROBLEM SELECTOR PLAN 4
- Bilateral PE in 7/2020, on Xarelto   -Previously on Heparin drip given need for multiple procedures.   - Restarted xarelto 10/1. Hoang 10/3 dose held in preparation for ICD placement Monday - Bilateral PE in 7/2020, on Xarelto   -Previously on Heparin drip given need for multiple procedures.   -Continue to hold xarelto until 10/6 per EP

## 2021-10-04 NOTE — CHART NOTE - NSCHARTNOTEFT_GEN_A_CORE
Pre-op Diagnosis:  Ischemic cardiomyopathy  ICD implantation in 2005 with history of sustained VT in 2016 requiring therapy from device.  MSSA bacteremia and is s/p ICD extraction    Post-op Diagnosis:  Same     Procedure:  Implantation of a [employer] Subcutaneous ICD     Electrophysiologist:  Virgie Quiles MD     Fellow:  Sanjay Peralta MD     Anesthesia:  Monitored anesthesia care     Description:  Successful implantation of a [employer] Subcutaneous ICD     Complications:  None     EBL:  about 10 ml     Disposition:  stable, to recovery     Plan:  Bed rest for 2 hours.  ECG post procedure  Chest Xray   Restart Xarelto on 10/6/21  Continue antibiotics per ID recommendations   Continue current medications including sotalol

## 2021-10-04 NOTE — PROVIDER CONTACT NOTE (OTHER) - ACTION/TREATMENT ORDERED:
MD aware. Hold AM dose of entresto 1 tab and sotalol 40mg PO as per parameters. Will continue to monitor.

## 2021-10-04 NOTE — PROGRESS NOTE ADULT - PROBLEM SELECTOR PLAN 2
- ICD placed >20 years ago, 2/2 sustained VT  - Denied cardiac arrest, suspect sustained VT with pulse and had ICD placement  - Continue sotalol BID  -ICD interrogated at OSH, no shocks recorded, ICD functioning battery life 2.5 years; Vpaced <1%  - ICD extracted as above.   -Pt with apparent AICD fired x 1 overnight 9/25, pt asymptomatic, denied chest pain other than feeling shock. Will monitor. Currently sinus with minimal v-pacing on tele.   -Per ID pt cleared for subcutaneous ICD placement once >2 weeks from abx clearance. Discussed with EP. Plan to place new subcutaneous ICD on Monday 10/4  -COVID swab administered 10/3 for pre-procedure screening. - ICD placed >20 years ago, 2/2 sustained VT  -ICD placed on 10/4 given leads extracted for MSSA bacteremia prophylactically  -CXR for lead placement tomorrow AM

## 2021-10-04 NOTE — PROGRESS NOTE ADULT - PROBLEM SELECTOR PLAN 7
- Diagnosed in March 2021, on chemotherapy (does not recall name of medication)  - Continue home Pancrelipase 36,000 2 tabs PO TID with meals  - Per podiatry, Dr. López spoke with Dr. Murphy at [place]- Whipple to be postponed until after resolution of acute OM infection, but does not need to wait until full course abx completed per ID.   -Pain meds as above  -Dr. Mercer/Surgery consulted for second opinion regarding Whipple. Will obtain CT abd/pelvis. - Diagnosed in March 2021, on chemotherapy (does not recall name of medication)  - Continue home Pancrelipase 36,000 2 tabs PO TID with meals  - Per podiatry, Dr. López spoke with Dr. Murphy at [place]- Whipple to be postponed until after resolution of acute OM infection, but does not need to wait until full course abx completed per ID.   -Pain meds as above  -Dr. Mercer/Surgery consulted for second opinion regarding ipple. Will also obtain preoperative evaluation by cardiology while inpatient

## 2021-10-04 NOTE — PROGRESS NOTE ADULT - ASSESSMENT
80 year old male with PMH pancreatic cancer (dx 3/2021, currently undergoing chemo), HTN, HLD, CHFrEF, CAD s/p stents x2 (~15 years ago), Abbott ICD with Riata lead from 1/19/05, sustained VT in 2016 treated with ATP from his ICD (maintain on low dose sotalol), TAVR (4/2021), bilateral PE (7/2021) on Xarelto, spinal stenosis, GERD, BPH, macular degeneration initially presented to [facility] c/o R foot pain admitted for R hallux pressure injury and RLE cellulitis found to have MSSA bacteremia and acute OM of right hallux. Given recent TAVR, transfered [facility] for CORBIN and consideration of ICD extraction. CORBIN revealed no vegetation. Device interrogation revealed one episode of paroxysmal AT 9/26/21 at ~200bpm. Met rate for VT zone. Received ATPx4 & 1 36J shock s/p termination. No other therapies. Pt states he has never received shocks post implant. Now s/p ICD extraction on 9/28/21 and RCW port removal 9/30.     1. RLE cellulitis and acute OM of right hallux s/p Rt 1st partial ray resection.   2. Chronic systolic HF s/p single chamber Abbott ICD.   3. HTN  4. s/p TAVR 4/2021  5. Bilateral PE (7/2021)     - MSSA bacteremia on admission BCx 9/15; repeat BCx from 9/16 are NGTD, lead tip cx NGTD. Pt is s/p ICD extraction 9/28/21, RCW port removal 9/30/21  - LVEF 35%, hx of sustained VT. Pt is s/p S-ICD 10/4/21.   - Dressings in place; C/D/I. Remove top dressing in 2 days, leave underlying steri-strips in place. Verbal and written instructions provided for patient.   - CXR tomorrow.   - Continue home dose Sotalol 40mg q12 hr.  - AC (Xarelto) on hold. Can resume on Wednesday 10/6/21.   - Pt has wound check on 10/15/21 at 1:40 PM.   - Continue antibiotics per ID  - Replete lytes. Keep K>4, Mg>2.    80 year old male with PMH pancreatic cancer (dx 3/2021, currently undergoing chemo), HTN, HLD, CHFrEF, CAD s/p stents x2 (~15 years ago), Abbott ICD with Riata lead from 1/19/05, sustained VT in 2016 treated with ATP from his ICD (maintain on low dose sotalol), TAVR (4/2021), bilateral PE (7/2021) on Xarelto, spinal stenosis, GERD, BPH, macular degeneration initially presented to [facility] c/o R foot pain admitted for R hallux pressure injury and RLE cellulitis found to have MSSA bacteremia and acute OM of right hallux. Given recent TAVR, transfered [facility] for CORBIN and consideration of ICD extraction. CORBIN revealed no vegetation. Device interrogation revealed one episode of paroxysmal AT 9/26/21 at ~200bpm. Met rate for VT zone. Received ATPx4 & 1 36J shock s/p termination. No other therapies. Pt states he has never received shocks post implant. Now s/p ICD extraction on 9/28/21 and RCW port removal 9/30.     1. RLE cellulitis and acute OM of right hallux s/p Rt 1st partial ray resection.   2. Chronic systolic HF s/p single chamber Abbott ICD.   3. HTN  4. s/p TAVR 4/2021  5. Bilateral PE (7/2021)     - MSSA bacteremia on admission BCx 9/15; repeat BCx from 9/16 are NGTD, lead tip cx NGTD. Pt is s/p ICD extraction 9/28/21, RCW port removal 9/30/21  - LVEF 35%, hx of sustained VT. Pt is s/p S-ICD 10/4/21.   - Dressings in place; C/D/I. Remove top dressing in 2 days, leave underlying steri-strips in place. Verbal and written instructions provided for patient.   - CXR tomorrow.   - Device check tomorrow.  - Continue home dose Sotalol 40mg q12 hr.  - AC (Xarelto) on hold. Can resume on Wednesday 10/6/21.   - Continue antibiotics per ID  - Replete lytes. Keep K>4, Mg>2.   - Pt has wound check on 10/15/21 at 1:40 PM.

## 2021-10-04 NOTE — PROGRESS NOTE ADULT - SUBJECTIVE AND OBJECTIVE BOX
Follow Up:  Bacteremia    Interval History: afebrile. s/p ICD implanted today. no acute overnight events.     REVIEW OF SYSTEMS  [  ] ROS unobtainable because:    [x  ] All other systems negative except as noted below    Constitutional:  [ ] fever [ ] chills  [ ] weight loss  [ ] weakness  Skin:  [ ] rash [ ] phlebitis	  Eyes: [ ] icterus [ ] pain  [ ] discharge	  ENMT: [ ] sore throat  [ ] thrush [ ] ulcers [ ] exudates  Respiratory: [ ] dyspnea [ ] hemoptysis [ ] cough [ ] sputum	  Cardiovascular:  [ ] chest pain [ ] palpitations [ ] edema	  Gastrointestinal:  [ ] nausea [ ] vomiting [ ] diarrhea [ ] constipation [ ] pain	  Genitourinary:  [ ] dysuria [ ] frequency [ ] hematuria [ ] discharge [ ] flank pain  [ ] incontinence  Musculoskeletal:  [ ] myalgias [ ] arthralgias [ ] arthritis  [ ] back pain  Neurological:  [ ] headache [ ] seizures  [ ] confusion/altered mental status    Allergies  No Known Allergies        ANTIMICROBIALS:  ceFAZolin   IVPB 2000 every 8 hours      OTHER MEDS:  MEDICATIONS  (STANDING):  acetaminophen   Tablet .. 650 every 4 hours PRN  ALPRAZolam 0.25 three times a day PRN  DULoxetine 60 daily  gabapentin 300 two times a day  HYDROmorphone  Injectable 0.2 every 10 minutes PRN  influenza   Vaccine 0.5 once  melatonin 6 at bedtime  ondansetron Injectable 8 once PRN  oxycodone    5 mG/acetaminophen 325 mG 1 every 4 hours PRN  oxycodone    5 mG/acetaminophen 325 mG 2 every 4 hours PRN  pancrelipase  (CREON 36,000 Lipase Units) 2 three times a day with meals  pantoprazole    Tablet 40 before breakfast  sacubitril 24 mG/valsartan 26 mG 1 two times a day  sotalol 40 every 12 hours  tamsulosin 0.4 at bedtime      Vital Signs Last 24 Hrs  T(C): 36.6 (04 Oct 2021 14:15), Max: 37 (04 Oct 2021 07:37)  T(F): 97.9 (04 Oct 2021 14:15), Max: 98.6 (04 Oct 2021 07:37)  HR: 76 (04 Oct 2021 14:15) (70 - 86)  BP: 120/81 (04 Oct 2021 14:15) (92/55 - 151/73)  BP(mean): 70 (04 Oct 2021 12:30) (70 - 70)  RR: 18 (04 Oct 2021 14:15) (16 - 18)  SpO2: 98% (04 Oct 2021 14:15) (95% - 99%)    PHYSICAL EXAMINATION:  General: Alert and Awake, NAD  Cardiac: RRR, No M/R/G  Resp: CTAB, No Wh/Rh/Ra  Abdomen: NBS, NT/ND, No HSM, No rigidity or guarding  MSK: +R foot with overlying dressing. Rest of foot well healed and without wounds,  Chest: L ICD (No surrounding erythema, drainage or tenderness to palpation)  : No hernandez  Skin: No rashes or lesions. Skin is warm and dry to the touch.   Neuro: Alert and Awake. CN 2-12 Grossly intact. Moves all four extremities spontaneously.  Psych: Calm, Pleasant, Cooperative  Vasc: R Chest mediport extraction site, well healed.                             8.6    4.87  )-----------( 199      ( 04 Oct 2021 07:03 )             26.6       10-04    136  |  101  |  14  ----------------------------<  92  4.6   |  23  |  0.70    Ca    9.3      04 Oct 2021 07:02  Phos  4.1     10-04  Mg     1.9     10-04            MICROBIOLOGY:  v  .Other icd lead tip  09-29-21   Culture is being performed.  --  --      .Other icd lead tip  09-29-21   Testing in progress  --  --      .Surgical Swab icd lead tip  09-29-21   No growth at 5 days  --  --      .Tissue Right hallux bone culutre  09-21-21   Few Pseudomonas aeruginosa  Rare Staphylococcus pettenkoferi  --  Pseudomonas aeruginosa  Staphylococcus pettenkoferi      .Tissue right first metatarsal bone cu  09-21-21   Rare Pseudomonas aeruginosa  Rare Staphylococcus pettenkoferi  --  Pseudomonas aeruginosa  Staphylococcus pettenkoferi      .Blood Blood-Peripheral  09-16-21   No Growth Final  --  --      .Tissue right hallux bone culture  09-16-21   Few Staphylococcus aureus  --  Staphylococcus aureus      Clean Catch Clean Catch (Midstream)  09-15-21   <10,000 CFU/mL Normal Urogenital Juani  --  --      .Blood Blood-Peripheral  09-15-21   Growth in aerobic bottle: Staphylococcus aureus  See previous culture [id number]  --  Blood Culture PCR  Staphylococcus aureus    RADIOLOGY:    <The imaging below has been reviewed and visualized by me independently. Findings as detailed in report below>    < from: CT Abdomen and Pelvis w/ IV Cont (10.02.21 @ 20:45) >  IMPRESSION:    No previous pancreatic imaging for comparison.    Marked pancreatic atrophy. Minimal ill-definition of the pancreatic head.    Cannot exclude a 9 mm nodule versus lymph node versus normal acinar tissue medial uncinate process.    Possible tiny 8 mm x 5 mm peripancreatic lymph node superior to the head of the pancreas.    < end of copied text >

## 2021-10-05 ENCOUNTER — TRANSCRIPTION ENCOUNTER (OUTPATIENT)
Age: 81
End: 2021-10-05

## 2021-10-05 VITALS
OXYGEN SATURATION: 99 % | HEART RATE: 82 BPM | DIASTOLIC BLOOD PRESSURE: 72 MMHG | RESPIRATION RATE: 18 BRPM | SYSTOLIC BLOOD PRESSURE: 101 MMHG | TEMPERATURE: 98 F

## 2021-10-05 LAB
ANION GAP SERPL CALC-SCNC: 9 MMOL/L — SIGNIFICANT CHANGE UP (ref 5–17)
BUN SERPL-MCNC: 13 MG/DL — SIGNIFICANT CHANGE UP (ref 7–23)
CALCIUM SERPL-MCNC: 9 MG/DL — SIGNIFICANT CHANGE UP (ref 8.4–10.5)
CHLORIDE SERPL-SCNC: 103 MMOL/L — SIGNIFICANT CHANGE UP (ref 96–108)
CO2 SERPL-SCNC: 22 MMOL/L — SIGNIFICANT CHANGE UP (ref 22–31)
CREAT SERPL-MCNC: 0.72 MG/DL — SIGNIFICANT CHANGE UP (ref 0.5–1.3)
GLUCOSE SERPL-MCNC: 109 MG/DL — HIGH (ref 70–99)
HCT VFR BLD CALC: 26.5 % — LOW (ref 39–50)
HGB BLD-MCNC: 8.6 G/DL — LOW (ref 13–17)
MAGNESIUM SERPL-MCNC: 1.7 MG/DL — SIGNIFICANT CHANGE UP (ref 1.6–2.6)
MCHC RBC-ENTMCNC: 32.5 GM/DL — SIGNIFICANT CHANGE UP (ref 32–36)
MCHC RBC-ENTMCNC: 33.7 PG — SIGNIFICANT CHANGE UP (ref 27–34)
MCV RBC AUTO: 103.9 FL — HIGH (ref 80–100)
NRBC # BLD: 0 /100 WBCS — SIGNIFICANT CHANGE UP (ref 0–0)
PHOSPHATE SERPL-MCNC: 4.3 MG/DL — SIGNIFICANT CHANGE UP (ref 2.5–4.5)
PLATELET # BLD AUTO: 179 K/UL — SIGNIFICANT CHANGE UP (ref 150–400)
POTASSIUM SERPL-MCNC: 4.1 MMOL/L — SIGNIFICANT CHANGE UP (ref 3.5–5.3)
POTASSIUM SERPL-SCNC: 4.1 MMOL/L — SIGNIFICANT CHANGE UP (ref 3.5–5.3)
RBC # BLD: 2.55 M/UL — LOW (ref 4.2–5.8)
RBC # FLD: 15.1 % — HIGH (ref 10.3–14.5)
SODIUM SERPL-SCNC: 134 MMOL/L — LOW (ref 135–145)
WBC # BLD: 4.77 K/UL — SIGNIFICANT CHANGE UP (ref 3.8–10.5)
WBC # FLD AUTO: 4.77 K/UL — SIGNIFICANT CHANGE UP (ref 3.8–10.5)

## 2021-10-05 PROCEDURE — 71046 X-RAY EXAM CHEST 2 VIEWS: CPT | Mod: 26

## 2021-10-05 PROCEDURE — 99239 HOSP IP/OBS DSCHRG MGMT >30: CPT

## 2021-10-05 PROCEDURE — 99233 SBSQ HOSP IP/OBS HIGH 50: CPT | Mod: 24

## 2021-10-05 PROCEDURE — 99232 SBSQ HOSP IP/OBS MODERATE 35: CPT

## 2021-10-05 RX ORDER — FUROSEMIDE 40 MG
1 TABLET ORAL
Qty: 0 | Refills: 0 | DISCHARGE

## 2021-10-05 RX ORDER — PROCHLORPERAZINE MALEATE 5 MG
0 TABLET ORAL
Qty: 0 | Refills: 0 | DISCHARGE

## 2021-10-05 RX ORDER — OMEPRAZOLE 10 MG/1
1 CAPSULE, DELAYED RELEASE ORAL
Qty: 0 | Refills: 0 | DISCHARGE

## 2021-10-05 RX ORDER — SACUBITRIL AND VALSARTAN 24; 26 MG/1; MG/1
1 TABLET, FILM COATED ORAL
Qty: 0 | Refills: 0 | DISCHARGE
Start: 2021-10-05

## 2021-10-05 RX ADMIN — Medication 0.25 MILLIGRAM(S): at 03:10

## 2021-10-05 RX ADMIN — Medication 100 MILLIGRAM(S): at 13:22

## 2021-10-05 RX ADMIN — Medication 100 MILLIGRAM(S): at 05:55

## 2021-10-05 RX ADMIN — OXYCODONE AND ACETAMINOPHEN 2 TABLET(S): 5; 325 TABLET ORAL at 08:17

## 2021-10-05 RX ADMIN — DORZOLAMIDE HYDROCHLORIDE TIMOLOL MALEATE 1 DROP(S): 20; 5 SOLUTION/ DROPS OPHTHALMIC at 05:28

## 2021-10-05 RX ADMIN — PANTOPRAZOLE SODIUM 40 MILLIGRAM(S): 20 TABLET, DELAYED RELEASE ORAL at 05:55

## 2021-10-05 RX ADMIN — Medication 40 MILLIGRAM(S): at 07:47

## 2021-10-05 RX ADMIN — Medication 2 CAPSULE(S): at 07:48

## 2021-10-05 RX ADMIN — SACUBITRIL AND VALSARTAN 1 TABLET(S): 24; 26 TABLET, FILM COATED ORAL at 07:48

## 2021-10-05 RX ADMIN — GABAPENTIN 300 MILLIGRAM(S): 400 CAPSULE ORAL at 05:55

## 2021-10-05 RX ADMIN — OXYCODONE AND ACETAMINOPHEN 2 TABLET(S): 5; 325 TABLET ORAL at 07:47

## 2021-10-05 RX ADMIN — Medication 0.25 MILLIGRAM(S): at 11:28

## 2021-10-05 RX ADMIN — Medication 650 MILLIGRAM(S): at 14:46

## 2021-10-05 RX ADMIN — Medication 1 TABLET(S): at 11:29

## 2021-10-05 RX ADMIN — DULOXETINE HYDROCHLORIDE 60 MILLIGRAM(S): 30 CAPSULE, DELAYED RELEASE ORAL at 11:29

## 2021-10-05 RX ADMIN — CHLORHEXIDINE GLUCONATE 1 APPLICATION(S): 213 SOLUTION TOPICAL at 05:28

## 2021-10-05 RX ADMIN — Medication 2 CAPSULE(S): at 11:32

## 2021-10-05 RX ADMIN — Medication 650 MILLIGRAM(S): at 15:16

## 2021-10-05 RX ADMIN — Medication 1 DROP(S): at 11:29

## 2021-10-05 RX ADMIN — Medication 100 MILLIGRAM(S): at 11:29

## 2021-10-05 NOTE — PROGRESS NOTE ADULT - PROBLEM SELECTOR PROBLEM 1
Acute osteomyelitis

## 2021-10-05 NOTE — PROGRESS NOTE ADULT - SUBJECTIVE AND OBJECTIVE BOX
24H hour events: SR/ to 110 bpm, 13 bts WCT on tele overnight     MEDICATIONS:  sacubitril 24 mG/valsartan 26 mG 1 Tablet(s) Oral two times a day  sotalol 40 milliGRAM(s) Oral every 12 hours  tamsulosin 0.4 milliGRAM(s) Oral at bedtime    ceFAZolin   IVPB 2000 milliGRAM(s) IV Intermittent every 8 hours      acetaminophen   Tablet .. 650 milliGRAM(s) Oral every 4 hours PRN  ALPRAZolam 0.25 milliGRAM(s) Oral three times a day PRN  DULoxetine 60 milliGRAM(s) Oral daily  gabapentin 300 milliGRAM(s) Oral two times a day  HYDROmorphone  Injectable 0.2 milliGRAM(s) IV Push every 10 minutes PRN  melatonin 6 milliGRAM(s) Oral at bedtime  ondansetron Injectable 8 milliGRAM(s) IV Push once PRN  oxycodone    5 mG/acetaminophen 325 mG 1 Tablet(s) Oral every 4 hours PRN  oxycodone    5 mG/acetaminophen 325 mG 2 Tablet(s) Oral every 4 hours PRN    pancrelipase  (CREON 36,000 Lipase Units) 2 Capsule(s) Oral three times a day with meals  pantoprazole    Tablet 40 milliGRAM(s) Oral before breakfast      chlorhexidine 4% Liquid 1 Application(s) Topical <User Schedule>  dorzolamide 2%/timolol 0.5% Ophthalmic Solution 1 Drop(s) Both EYES two times a day  influenza   Vaccine 0.5 milliLiter(s) IntraMuscular once  ketorolac 0.5% Ophthalmic Solution 1 Drop(s) Both EYES daily  latanoprost 0.005% Ophthalmic Solution 1 Drop(s) Both EYES at bedtime  multivitamin 1 Tablet(s) Oral daily  pyridoxine 100 milliGRAM(s) Oral daily      REVIEW OF SYSTEMS:  See HPI, otherwise ROS negative.    PHYSICAL EXAM:  T(C): 36.7 (10-05-21 @ 05:40), Max: 36.8 (10-04-21 @ 16:15)  HR: 86 (10-05-21 @ 07:42) (70 - 86)  BP: 114/76 (10-05-21 @ 07:42) (94/58 - 151/73)  RR: 18 (10-05-21 @ 05:40) (16 - 18)  SpO2: 98% (10-05-21 @ 05:40) (94% - 99%)  Wt(kg): --  I&O's Summary    04 Oct 2021 07:01  -  05 Oct 2021 07:00  --------------------------------------------------------  IN: 650 mL / OUT: 625 mL / NET: 25 mL        Appearance: Alert in NAD	  Cardiovascular: +S1S2 RRR no m/g/r  Respiratory: CTA B/L	  Gastrointestinal:  Soft, non-tender and non-distended. +BS	  Skin: L infraclavicular site c/d/i w/o hematoma, steri strips in place.   L lateral chest wall incision c/d/i, mildly tender to palpation w/o hematoma, dressing overlying   mid sternal sub xiphoid incision c/d/i w/o hematoma, dressing overlying   Neurologic: Non-focal  Extremities: No edema BLE  Vascular: Peripheral pulses palpable 2+ bilaterally      LABS:	 	    CBC Full  -  ( 05 Oct 2021 06:09 )  WBC Count : 4.77 K/uL  Hemoglobin : 8.6 g/dL  Hematocrit : 26.5 %  Platelet Count - Automated : 179 K/uL  Mean Cell Volume : 103.9 fl  Mean Cell Hemoglobin : 33.7 pg  Mean Cell Hemoglobin Concentration : 32.5 gm/dL  Auto Neutrophil # : x  Auto Lymphocyte # : x  Auto Monocyte # : x  Auto Eosinophil # : x  Auto Basophil # : x  Auto Neutrophil % : x  Auto Lymphocyte % : x  Auto Monocyte % : x  Auto Eosinophil % : x  Auto Basophil % : x    10-05    134<L>  |  103  |  13  ----------------------------<  109<H>  4.1   |  22  |  0.72  10-04    136  |  101  |  14  ----------------------------<  92  4.6   |  23  |  0.70    Ca    9.0      05 Oct 2021 06:09  Ca    9.3      04 Oct 2021 07:02  Phos  4.3     10-05  Phos  4.1     10-04  Mg     1.7     10-05  Mg     1.9     10-04    TELEMETRY: SR/ST with 13 beats WCT overnight   	    ECG:  	10/4 SR 72bpm, AL 166ms, QT measured 400/QTc 438ms     RADIOLOGY:  CXR PA/lat no ptx, lead in good position

## 2021-10-05 NOTE — PROVIDER CONTACT NOTE (OTHER) - ASSESSMENT
Pt is A&Ox4, resting in bed with no c/o of pain, dizziness, lightheadedness or SOB. Pt is asymptomatic. VS in flowsheet.

## 2021-10-05 NOTE — PROVIDER CONTACT NOTE (OTHER) - NAME OF MD/NP/PA/DO NOTIFIED:
Fady justin MD
Edvin Andrews MD
Team 5
Brian Allan MD
william Mckay MD
Brian Allan MD
Edvin Andrews MD/Night Float Team 5
MD Brian (Team 5)
T5 Brian Allan MD
Jerrell MAYBERRY

## 2021-10-05 NOTE — PROGRESS NOTE ADULT - ASSESSMENT
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    Cellulitis of right lower leg and Acute OM of right hallux  - s/p right 1st partial ray resection.  Tolerated procedure well with no obvious cardiac complications  - MSSA bacteremia on admission BCx; repeat BCx from 9/16 are NGTD  - s/p CORBIN in the setting of TAVR and ICD. no vegetations, ef 30-35  - s/p icd extraction and tolerated procedure well  - subq icd done on 10/5  - Abx per ID.  Follow recs    Pulmonary embolism.   - Bilateral PE in 7/2020, on Xarelto   - resume xarelto when no ep issue    Chronic systolic HF S/P ICD, MS    - moderate lv dysfxn and moderate ms based on annular calcification  - Continue Entresto with hold parameters  - bp at time soft. cont to hold lasix  - No signs of acute volume overload. Tolerating Room air  - Strict I/Os, daily weights  - Monitor and replete Lytes. Keep K > 4 and Mg > 2  - on sotalol for vt in the past    HTN  - continue sotalol with hold parameters  - continue entresto with hold parameters  - bp has been soft.     - Monitor and replete lytes, keep K>4, Mg>2.  - All other medical needs as per primary team.  - Other cardiovascular workup will depend on clinical course.  - surgery deciding on future plan.   - Will continue to follow. 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    Cellulitis of right lower leg and Acute OM of right hallux  - s/p right 1st partial ray resection.  Tolerated procedure well with no obvious cardiac complications  - MSSA bacteremia on admission BCx; repeat BCx from 9/16 are NGTD  - s/p CORBIN in the setting of TAVR and ICD. no vegetations, ef 30-35  - s/p icd extraction and tolerated procedure well  - subq icd done on 10/5  - Abx per ID.  Follow recs    Pulmonary embolism.   - Bilateral PE in 7/2020, on Xarelto   - resume xarelto when no ep issue    Chronic systolic HF S/P ICD, MS    - moderate lv dysfxn and moderate ms based on annular calcification  - Continue Entresto with hold parameters  - bp at time soft. cont to hold lasix  - No signs of acute volume overload. Tolerating Room air  - Strict I/Os, daily weights  - Monitor and replete Lytes. Keep K > 4 and Mg > 2  - on sotalol for vt in the past    HTN  - continue sotalol with hold parameters  - continue entresto with hold parameters  - bp has been soft.     - Monitor and replete lytes, keep K>4, Mg>2.  - All other medical needs as per primary team.  - Other cardiovascular workup will depend on clinical course.  - surgery deciding on future plan of pancreatic surgery. Though he has LV dysfunction, he has been doing well and is euvolemic. Once timing is determined, we can consider the option of a preop pharm stress test, though he has no worrisome symptoms at this time.  - Will continue to follow.

## 2021-10-05 NOTE — CONSULT NOTE ADULT - CONSULT REASON
L foot wound
Port removal in context of bacteremia
Bacteremia
CHF
MSSA Bacteria, ICD extraction
R foot hematoma/bleeding

## 2021-10-05 NOTE — PROVIDER CONTACT NOTE (OTHER) - DATE AND TIME:
05-Oct-2021 05:40
01-Oct-2021 02:51
04-Oct-2021 07:40
27-Sep-2021 02:00
02-Oct-2021 03:56
05-Oct-2021 05:10
30-Sep-2021 20:30
04-Oct-2021 06:12
02-Oct-2021 11:45
30-Sep-2021 02:30

## 2021-10-05 NOTE — CHART NOTE - NSCHARTNOTESELECT_GEN_ALL_CORE
Brief Ep Post Op Note
Brief Ep Post Op Note
Communication/coordination/Event Note
Event Note
iSTOP/Event Note

## 2021-10-05 NOTE — DISCHARGE NOTE NURSING/CASE MANAGEMENT/SOCIAL WORK - NSDCFUADDAPPT_GEN_ALL_CORE_FT
Please follow up with your internal medicine doctor, podiatrist, cardiologist, and cardiac electrophysiologist, hematologist/oncologist, and general surgery following discharge from rehab.

## 2021-10-05 NOTE — PROVIDER CONTACT NOTE (OTHER) - REASON
Continued Hypotension
Pt is hypotensive
14 bts WCT on tele, HR up to 133bpm
PATs  x 3.4 seconds
Pt is hypotensive
bp 76/48
Patient had  for 3.5 seconds
Pt had 4 beats WCT, HR
Pt had 13 beats WCT, HR 80
pt had 9 WCT on tele

## 2021-10-05 NOTE — PROGRESS NOTE ADULT - PROVIDER SPECIALTY LIST ADULT
Cardiology
Electrophysiology
Electrophysiology
Internal Medicine
Cardiology
Intervent Radiology
Surgery
Cardiology
Electrophysiology
Infectious Disease
Podiatry
Podiatry
Cardiology
Electrophysiology
Infectious Disease
Internal Medicine

## 2021-10-05 NOTE — DISCHARGE NOTE NURSING/CASE MANAGEMENT/SOCIAL WORK - PATIENT PORTAL LINK FT
You can access the FollowMyHealth Patient Portal offered by [facility] by registering at the following website: [email or web address]. By joining [facility]’s FollowMyHealth portal, you will also be able to view your health information using other applications (apps) compatible with our system.

## 2021-10-05 NOTE — PROVIDER CONTACT NOTE (OTHER) - RECOMMENDATIONS
Notify provider
Notify provider.
MD made aware
MD made aware
MD to be notified. Hold AM dose of entresto 1 tab and sotalol 40mg PO as per holding parameters.
Notify provider

## 2021-10-05 NOTE — PROVIDER CONTACT NOTE (OTHER) - ACTION/TREATMENT ORDERED:
Provider notified, ordered to reschedule PO entresto and betapace to 8am and re-check vitals. Will continue to monitor pt.

## 2021-10-05 NOTE — PROGRESS NOTE ADULT - PROBLEM SELECTOR PLAN 2
- ICD placed >20 years ago, 2/2 sustained VT  -ICD placed on 10/4 given leads extracted for MSSA bacteremia prophylactically  -CXR for lead placement tomorrow AM

## 2021-10-05 NOTE — PROGRESS NOTE ADULT - ASSESSMENT
80 year old male with PMH pancreatic cancer (diagnosed 3/2021, currently undergoing chemo planned for Whipple surgery), HTN, HLD, HFrEF, CAD s/p stents x2 (~15 years ago), Abbott ICD with Riata lead from 1/19/05, sustained VT in 2016 treated with ATP from his ICD (maintained on low dose Sotalol), TAVR (4/2021), bilateral PE (7/2021) on Xarelto, spinal stenosis, GERD, BPH, macular degeneration initially presented to [facility] c/o R foot pain admitted for R hallux pressure injury and RLE cellulitis found to have MSSA bacteremia and acute OM of right hallux. Given recent TAVR, he was transferred to [facility] for CORBIN and consideration of ICD extraction. CORBIN revealed no vegetation. Device interrogation revealed one episode of paroxysmal AT 9/26/21 at ~200bpm which met rate for VT zone and subsequently received ATPx4 & 1 36J shock with termination of arrhythmia. No other therapies. Now s/p ICD extraction on 9/28/21 and RCW port removal 9/30.     1. RLE cellulitis and acute OM of right hallux s/p Rt 1st partial ray resection.   2. Chronic systolic HF s/p single chamber Abbott ICD.   3. HTN  4. s/p TAVR 4/2021  5. Bilateral PE (7/2021)     - MSSA bacteremia on admission BCx 9/15; repeat BCx from 9/16 are NGTD, lead tip cx NGTD. Pt is s/p ICD extraction 9/28/21, RCW port removal 9/30/21  - LVEF 35%, hx of sustained VT. Pt is s/p S-ICD 10/4/21.   - Dressings in place; C/D/I. Remove top dressing tomorrow, leave underlying steri-strips in place. Verbal and written instructions provided for patient.   - CXR PA/lateral performed, revealing good position of lead.  - Continue home dose Sotalol 40mg q12 hr. QTC WNL, perform EKG 2hours post dose today to assess for derangements. Hold dose if >500ms   - AC (Xarelto) on hold. Can resume on Wednesday 10/6/21.   - Continue antibiotics per ID  - Replete lytes. Keep K>4, Mg>2.   - Pt has wound check on 10/15/21 at 1:40 PM.  80 year old male with PMH pancreatic cancer (diagnosed 3/2021, currently undergoing chemo planned for Whipple surgery), HTN, HLD, HFrEF, CAD s/p stents x2 (~15 years ago), Abbott ICD with Riata lead from 1/19/05, sustained VT in 2016 treated with ATP from his ICD (maintained on low dose Sotalol), TAVR (4/2021), bilateral PE (7/2021) on Xarelto, spinal stenosis, GERD, BPH, macular degeneration initially presented to [facility] c/o R foot pain admitted for R hallux pressure injury and RLE cellulitis found to have MSSA bacteremia and acute OM of right hallux. Given recent TAVR, he was transferred to [facility] for CORBIN and consideration of ICD extraction. CORBIN revealed no vegetation. Device interrogation revealed one episode of paroxysmal AT 9/26/21 at ~200bpm which met rate for VT zone and subsequently received ATPx4 & 1 36J shock with termination of arrhythmia. No other therapies. Now s/p ICD extraction on 9/28/21 and RCW port removal 9/30.     1. RLE cellulitis and acute OM of right hallux s/p first ray resection.   2. Chronic systolic HF s/p single chamber Abbott ICD.   3. HTN  4. s/p TAVR 4/2021  5. Bilateral PE (7/2021)     - MSSA bacteremia on admission BCx 9/15; repeat BCx from 9/16 are NGTD, lead tip cx NGTD. Pt is s/p ICD extraction 9/28/21, RCW port removal 9/30/21  - LVEF 35%, hx of sustained VT now is s/p S-ICD 10/4/21.   - Dressings in place; C/D/I. Remove top dressing tomorrow, leave underlying steri-strips in place. Verbal and written instructions provided for patient.   - CXR PA/lateral performed, revealing good position of lead.  - Continue home dose Sotalol 40mg q12 hr. QTC WNL, perform EKG 2 hours post administration to assess for derangements. Hold dose if >500ms   - Resume Xarelto tomorrow, 10/6, for history of PE   - Cefazolin x 4 weeks until 10/14, followed by PO Keflex for 2 weeks as per ID.   - K>4, Mg>2.   - Pt has wound check on 10/15/21 at 1:40 PM.       [id number]  80 year old male with PMH pancreatic cancer (diagnosed 3/2021, currently undergoing chemo planned for Whipple surgery), HTN, HLD, HFrEF, CAD s/p stents x2 (~15 years ago), Abbott ICD with Riata lead from 1/19/05, sustained VT in 2016 treated with ATP from his ICD (maintained on low dose Sotalol), TAVR (4/2021), bilateral PE (7/2021) on Xarelto, spinal stenosis, GERD, BPH, macular degeneration initially presented to [facility] c/o R foot pain admitted for R hallux pressure injury and RLE cellulitis found to have MSSA bacteremia and acute OM of right hallux. Given recent TAVR, he was transferred to [facility] for CORBIN and consideration of ICD extraction. CORBIN revealed no vegetation. Device interrogation revealed one episode of paroxysmal AT 9/26/21 at ~200bpm which met rate for VT zone and subsequently received ATPx4 & 1 36J shock with termination of arrhythmia. No other therapies. Now s/p ICD extraction on 9/28/21 and RCW port removal 9/30.     1. RLE cellulitis and acute OM of right hallux s/p first ray resection.   2. Chronic systolic HF s/p single chamber Abbott ICD.   3. HTN  4. s/p TAVR 4/2021  5. Bilateral PE (7/2021)     - MSSA bacteremia on admission BCx 9/15; repeat BCx from 9/16 are NGTD, lead tip cx NGTD. Pt is s/p ICD extraction 9/28/21, RCW port removal 9/30/21  - LVEF 35%, hx of sustained VT now is s/p S-ICD 10/4/21.   - Dressings in place; C/D/I. Remove top dressing tomorrow, leave underlying steri-strips in place. Verbal and written instructions provided for patient.   - CXR PA/lateral performed, revealing good position of lead.  - Continue home dose Sotalol 40mg q12 hr. QTC WNL, hold dose if >500ms   - Resume Xarelto tomorrow, 10/6, for history of PE   - Cefazolin x 4 weeks until 10/14, followed by PO Keflex for 2 weeks as per ID.   - K>4, Mg>2.   - Pt has wound check on 10/15/21 at 1:40 PM.       [id number]  80 year old male with PMH pancreatic cancer (diagnosed 3/2021, currently undergoing chemo planned for Whipple surgery), HTN, HLD, HFrEF, CAD s/p stents x2 (~15 years ago), Abbott ICD with Riata lead from 1/19/05, sustained VT in 2016 treated with ATP from his ICD (maintained on low dose Sotalol), TAVR (4/2021), bilateral PE (7/2021) on Xarelto, spinal stenosis, GERD, BPH, macular degeneration initially presented to [facility] c/o R foot pain admitted for R hallux pressure injury and RLE cellulitis found to have MSSA bacteremia and acute OM of right hallux. Given recent TAVR, he was transferred to [facility] for CORBIN and consideration of ICD extraction. CORBIN revealed no vegetation. Device interrogation revealed one episode of paroxysmal AT 9/26/21 at ~200bpm which met rate for VT zone and subsequently received ATPx4 & 1 36J shock with termination of arrhythmia. No other therapies. Now s/p ICD extraction on 9/28/21 and RCW port removal 9/30.     1. RLE cellulitis and acute OM of right hallux s/p first ray resection.   2. Chronic systolic HF s/p single chamber Abbott ICD.   3. HTN  4. s/p TAVR 4/2021  5. Bilateral PE (7/2021)     - MSSA bacteremia on admission BCx 9/15; repeat BCx from 9/16 are NGTD, lead tip cx NGTD. Pt is s/p ICD extraction 9/28/21, RCW port removal 9/30/21  - LVEF 35%, hx of sustained VT now is s/p S-ICD 10/4/21.   - Dressings in place; C/D/I. Remove top dressing tomorrow, leave underlying steri-strips in place. Verbal and written instructions provided for patient.   - CXR PA/lateral performed, revealing good position of lead.  - Continue home dose Sotalol 40mg q12 hr. QTC WNL, hold dose if >500ms   - Resume Xarelto tomorrow, 10/6, for history of PE   - Cefazolin x 4 weeks until 10/14, followed by PO Keflex for 2 weeks as per ID.   - K>4, Mg>2.   - Pt has wound check on 10/15/21 at 1:40 PM.      - No further EP intervention, to sign off at this time, re-consult PRN.      [id number]

## 2021-10-05 NOTE — PROGRESS NOTE ADULT - ATTENDING SUPERVISION STATEMENT
ACP
Resident

## 2021-10-05 NOTE — CHART NOTE - NSCHARTNOTEFT_GEN_A_CORE
Pt was diagnosed with pancreatic cancer in 3/2021, s/p chemoRx.  Follows Dr. Murphy at [facility].    No further planning for chemotherapy at this time.  Pt to follow up with surg-onc for surgical procedure upon discharge.     Deysi Feng MD  Division of Hospital Medicine  Cell: [phone]  Office: [phone]

## 2021-10-05 NOTE — PROGRESS NOTE ADULT - PROBLEM SELECTOR PLAN 6
- Chronic, s/p cardiac stents x2 ~15 years ago  - Cardiology following, will appreciate recs

## 2021-10-05 NOTE — PROGRESS NOTE ADULT - PROBLEM SELECTOR PLAN 4
- Bilateral PE in 7/2020, on Xarelto   -Previously on Heparin drip given need for multiple procedures.   -Continue to hold xarelto until 10/6 per EP

## 2021-10-05 NOTE — PROGRESS NOTE ADULT - SUBJECTIVE AND OBJECTIVE BOX
[facility] - Milo Thomas, Adolfo, Binta, Brittanie, Javier Don  Office Number:  [phone]    Patient resting comfortably in bed in NAD.  Laying flat with no respiratory distress.  No complaints of dyspnea, palpitations, PND, or orthopnea.  bp has been borderline  reports chest pain at site of icd    ROS: negative unless otherwise mentioned.    Telemetry:  sr, 13 beats nsvt    MEDICATIONS  (STANDING):  ceFAZolin   IVPB 2000 milliGRAM(s) IV Intermittent every 8 hours  chlorhexidine 4% Liquid 1 Application(s) Topical <User Schedule>  dorzolamide 2%/timolol 0.5% Ophthalmic Solution 1 Drop(s) Both EYES two times a day  DULoxetine 60 milliGRAM(s) Oral daily  gabapentin 300 milliGRAM(s) Oral two times a day  influenza   Vaccine 0.5 milliLiter(s) IntraMuscular once  ketorolac 0.5% Ophthalmic Solution 1 Drop(s) Both EYES daily  latanoprost 0.005% Ophthalmic Solution 1 Drop(s) Both EYES at bedtime  melatonin 6 milliGRAM(s) Oral at bedtime  multivitamin 1 Tablet(s) Oral daily  pancrelipase  (CREON 36,000 Lipase Units) 2 Capsule(s) Oral three times a day with meals  pantoprazole    Tablet 40 milliGRAM(s) Oral before breakfast  pyridoxine 100 milliGRAM(s) Oral daily  sacubitril 24 mG/valsartan 26 mG 1 Tablet(s) Oral two times a day  sotalol 40 milliGRAM(s) Oral every 12 hours  tamsulosin 0.4 milliGRAM(s) Oral at bedtime    MEDICATIONS  (PRN):  acetaminophen   Tablet .. 650 milliGRAM(s) Oral every 4 hours PRN Mild Pain (1 - 3)  ALPRAZolam 0.25 milliGRAM(s) Oral three times a day PRN Anxiety/agitation  HYDROmorphone  Injectable 0.2 milliGRAM(s) IV Push every 10 minutes PRN Moderate Pain (4 - 6)  ondansetron Injectable 8 milliGRAM(s) IV Push once PRN Nausea and/or Vomiting  oxycodone    5 mG/acetaminophen 325 mG 1 Tablet(s) Oral every 4 hours PRN Moderate Pain (4 - 6)  oxycodone    5 mG/acetaminophen 325 mG 2 Tablet(s) Oral every 4 hours PRN Severe Pain (7 - 10)      Allergies    No Known Allergies    Intolerances        Vital Signs Last 24 Hrs  T(C): 36.7 (05 Oct 2021 05:40), Max: 36.8 (04 Oct 2021 16:15)  T(F): 98 (05 Oct 2021 05:40), Max: 98.3 (04 Oct 2021 16:15)  HR: 86 (05 Oct 2021 07:42) (70 - 86)  BP: 114/76 (05 Oct 2021 07:42) (94/58 - 151/73)  BP(mean): 70 (04 Oct 2021 12:30) (70 - 70)  RR: 18 (05 Oct 2021 05:40) (16 - 18)  SpO2: 98% (05 Oct 2021 05:40) (94% - 99%)    I&O's Summary    04 Oct 2021 07:01  -  05 Oct 2021 07:00  --------------------------------------------------------  IN: 650 mL / OUT: 625 mL / NET: 25 mL        ON EXAM:  Constitutional: NAD, awake and alert, well-developed  HEENT: Moist Mucous Membranes, Anicteric  Pulmonary: Non-labored, breath sounds are clear bilaterally, No wheezing, rales or rhonchi  Cardiovascular: Regular, S1 and S2, No murmurs, rubs, gallops or clicks  Gastrointestinal: Bowel Sounds present, soft, nontender.   Lymph: No peripheral edema. No lymphadenopathy.  Skin: No visible rashes or ulcers.  Psych:  Mood & affect appropriate for situation    LABS: All Labs Reviewed:                        8.6    4.77  )-----------( 179      ( 05 Oct 2021 06:09 )             26.5                         8.6    4.87  )-----------( 199      ( 04 Oct 2021 07:03 )             26.6                         8.7    6.53  )-----------( 212      ( 03 Oct 2021 07:16 )             27.8     05 Oct 2021 06:09    134    |  103    |  13     ----------------------------<  109    4.1     |  22     |  0.72   04 Oct 2021 07:02    136    |  101    |  14     ----------------------------<  92     4.6     |  23     |  0.70   03 Oct 2021 07:12    135    |  102    |  17     ----------------------------<  103    4.5     |  24     |  0.77     Ca    9.0        05 Oct 2021 06:09  Ca    9.3        04 Oct 2021 07:02  Ca    9.3        03 Oct 2021 07:12  Phos  4.3       05 Oct 2021 06:09  Phos  4.1       04 Oct 2021 07:02  Phos  4.1       03 Oct 2021 07:12  Mg     1.7       05 Oct 2021 06:09  Mg     1.9       04 Oct 2021 07:02  Mg     1.9       03 Oct 2021 07:12      PT/INR - ( 04 Oct 2021 07:03 )   PT: 13.6 sec;   INR: 1.14 ratio         PTT - ( 04 Oct 2021 07:03 )  PTT:29.4 sec      Blood Culture:

## 2021-10-05 NOTE — PROVIDER CONTACT NOTE (OTHER) - SITUATION
pt had 9 WCT on tele
Patient BP remains soft, SBP 90's overnight. This AM, manual BP 94/58.
Pt had 13 beats WCT, HR 80
Pt had 4 beats WCT, HR
Pt is hypotensive
bp 76/48
PATs  x 3.4 seconds
14 bts WCT on tele, HR up to 133bpm
Patient had  for 3.5 seconds
Pt is hypotensive, BP: 84/48

## 2021-10-05 NOTE — PROGRESS NOTE ADULT - ATTENDING COMMENTS
above plans discussed with Dr. Mckay    # MSSA bacteremia  # RLE cellulitis/OM of hallux  # pancreatic CA  # concern for ICD infection  # chronic HFrEF  # PE/DVT on xarelto    - CORBIN with no clear vegetation but given high risk s/p ICD extraction  - s/p mediport removal as well  - continue IV ancef via PICC line (will not need total 4 week course); continue IV ancef till 10/14 and then transition to po keflex for another 2 weeks  - appreciate EP recs: s/p subcutaneous ICD placement   - Dr. Mercer (surg/onc) consulted for second opinion: CTAP reviewed with the patient and fu as outpatient  - care discussed with Dr. Hernandez for cardiac clearance: may need stress test once surgery date confirmed  - no further inpatient workups indicated at this time   - IR consulted for removal of mediport; no plan for chemoRx for now  - no signs of volume overload at this time; continue home dose lasix 20mg daily  - resumed on xarelto  - PT consult: [facility]     pt stable for discharge to [facility]  46 minutes spent on discharge process    Deysi Feng MD  Division of Hospital Medicine  Cell: [phone]  Office: [phone]

## 2021-10-05 NOTE — PROVIDER CONTACT NOTE (OTHER) - BACKGROUND
Dx: Bacteremia, Hx: Dysphagia, anemia, CHF, pancreatic cancer
(Admit Diagnosis) Bacteremia  Hx: CHF, Pancreatic CA, CAD
admitted for Bacteremia
pt admitted  for bacteremia
pt admitted to the hospital for bacteremia
s/p right hallux surgery
Dx: bacteremia, hX: CAD, HTN, GERD, HLD, osteomylitis
Admitting dx: bacteremia
Dx:  bacteremia, Hx: HTN, GERD, cardiomyopathy, HLD, osteomylitis, CAD, anemia
Dx: Bacteremia, Hx: HLD, GERD, HTN, cardiomyopathy

## 2021-10-05 NOTE — CONSULT NOTE ADULT - REASON FOR ADMISSION
osteomylitis, c/f endocarditis

## 2021-10-05 NOTE — PROGRESS NOTE ADULT - PROBLEM SELECTOR PROBLEM 6
Coronary artery disease

## 2021-10-05 NOTE — CONSULT NOTE ADULT - SUBJECTIVE AND OBJECTIVE BOX
Podiatry pager #: [phone] ([place])/ [phone] ([facility])    Patient is a 80y old  Male who presents with a chief complaint of osteomylitis, c/f endocarditis (05 Oct 2021 10:16)      HPI:  Mr. Gatica is a 80 year old male with PMH pancreatic cancer (dx 3/2021, currently undergoing chemo), HTN, HLD, CHF (unknown EF), CAD s/p stents x2 (~15 years ago), defibrillator, TAVR (4/2021), bilateral PE (7/2021) on Xarelto, spinal stenosis, GERD, BPH, macular degeneration initially presented to [facility] c/o R foot pain admitted for R hallux pressure injury and RLE cellulitis found to have MSSA bacteremia and acute OM of right hallux. Given recent TAVR, transfered [facility] for CORBIN and consideration of ICD extraction. At [facility], S/p R. hallux amputation 9/20 and RRT 9/23 for ruptured hematoma @ surgical site during PT, wound irrigated and packed, left open to heal by secondary intention. Bcx 9/15 growing MSSA, with clearance on Bcx 9/16. Currently, pt feels well. Notes some numbness/soreness at amputation site but otherwise ROS negative.  (26 Sep 2021 14:04)      PAST MEDICAL & SURGICAL HISTORY:  HTN (hypertension)    HLD (hyperlipidemia)    GERD (gastroesophageal reflux disease)    Cardiomyopathy    History of total hip replacement  left    History of laminectomy    ICD (implantable cardiac defibrillator) in place    Benign testicular tumor    History of hip replacement    Status post amputation of toe of right foot        MEDICATIONS  (STANDING):  ceFAZolin   IVPB 2000 milliGRAM(s) IV Intermittent every 8 hours  chlorhexidine 4% Liquid 1 Application(s) Topical <User Schedule>  dorzolamide 2%/timolol 0.5% Ophthalmic Solution 1 Drop(s) Both EYES two times a day  DULoxetine 60 milliGRAM(s) Oral daily  gabapentin 300 milliGRAM(s) Oral two times a day  influenza   Vaccine 0.5 milliLiter(s) IntraMuscular once  ketorolac 0.5% Ophthalmic Solution 1 Drop(s) Both EYES daily  latanoprost 0.005% Ophthalmic Solution 1 Drop(s) Both EYES at bedtime  melatonin 6 milliGRAM(s) Oral at bedtime  multivitamin 1 Tablet(s) Oral daily  pancrelipase  (CREON 36,000 Lipase Units) 2 Capsule(s) Oral three times a day with meals  pantoprazole    Tablet 40 milliGRAM(s) Oral before breakfast  pyridoxine 100 milliGRAM(s) Oral daily  sacubitril 24 mG/valsartan 26 mG 1 Tablet(s) Oral two times a day  sotalol 40 milliGRAM(s) Oral every 12 hours  tamsulosin 0.4 milliGRAM(s) Oral at bedtime    MEDICATIONS  (PRN):  acetaminophen   Tablet .. 650 milliGRAM(s) Oral every 4 hours PRN Mild Pain (1 - 3)  ALPRAZolam 0.25 milliGRAM(s) Oral three times a day PRN Anxiety/agitation  HYDROmorphone  Injectable 0.2 milliGRAM(s) IV Push every 10 minutes PRN Moderate Pain (4 - 6)  ondansetron Injectable 8 milliGRAM(s) IV Push once PRN Nausea and/or Vomiting  oxycodone    5 mG/acetaminophen 325 mG 1 Tablet(s) Oral every 4 hours PRN Moderate Pain (4 - 6)  oxycodone    5 mG/acetaminophen 325 mG 2 Tablet(s) Oral every 4 hours PRN Severe Pain (7 - 10)      Allergies    No Known Allergies    Intolerances        VITALS:    Vital Signs Last 24 Hrs  T(C): 36.4 (05 Oct 2021 11:38), Max: 36.8 (04 Oct 2021 16:15)  T(F): 97.5 (05 Oct 2021 11:38), Max: 98.3 (04 Oct 2021 16:15)  HR: 98 (05 Oct 2021 11:38) (76 - 98)  BP: 92/57 (05 Oct 2021 11:38) (92/57 - 135/75)  BP(mean): --  RR: 18 (05 Oct 2021 11:38) (18 - 18)  SpO2: 99% (05 Oct 2021 11:38) (94% - 99%)    LABS:                          8.6    4.77  )-----------( 179      ( 05 Oct 2021 06:09 )             26.5       10-05    134<L>  |  103  |  13  ----------------------------<  109<H>  4.1   |  22  |  0.72    Ca    9.0      05 Oct 2021 06:09  Phos  4.3     10-05  Mg     1.7     10-05        CAPILLARY BLOOD GLUCOSE          PT/INR - ( 04 Oct 2021 07:03 )   PT: 13.6 sec;   INR: 1.14 ratio         PTT - ( 04 Oct 2021 07:03 )  PTT:29.4 sec    LOWER EXTREMITY PHYSICAL EXAM:  Vasular: DP/PT 0/4, B/L, CFT <3 seconds B/L, Temperature gradient warm to cool, B/L.   Neuro: Epicritic sensation diminished to the level of digits, B/L.  Musculoskeletal/Ortho: s/p R foot partial ray resection on 9/20  Skin: R foot surgical site proximal sutures intact, distal sutures dehisced. Surgical flaps are warm and viable, no erythema, no malodor, no signs of acute infection. L foot is unremarkable for any signs of infection     RADIOLOGY & ADDITIONAL STUDIES:

## 2021-10-05 NOTE — PROVIDER CONTACT NOTE (OTHER) - ASSESSMENT
Pt is A&O x4, was sleeping during event, no c/o of SOB, chest pain or dizziness made. VSS, see flowsheet. Pt is A&O x4, was sleeping during event, no c/o of SOB, chest pain or dizziness made. Pt is asymptomatic.  VSS, see flowsheet.

## 2021-10-05 NOTE — CONSULT NOTE ADULT - ASSESSMENT
80M with R foot partial first ray resection dehiscence   - Pt seen and evaluated   - VSS, afebrile, no leukocytosis  - R foot surgical site proximal sutures intact, distal sutures dehisced. Surgical flaps are warm and viable, no erythema, no malodor, no signs of acute infection. L foot is unremarkable for any signs of infection   - Surgical site is granular with no signs of infection   - Pod stable for discharge, info in discharge paperwork  - Discussed with attending

## 2021-10-05 NOTE — CONSULT NOTE ADULT - CONSULT REQUESTED DATE/TIME
05-Oct-2021 13:32
27-Sep-2021 14:23
28-Sep-2021
27-Sep-2021 08:19
27-Sep-2021 10:15
26-Sep-2021 17:35

## 2021-10-05 NOTE — PROGRESS NOTE ADULT - PROBLEM SELECTOR PLAN 8
- Patient with complaints of coughing/choking on food on 9/18  - S/S consulted at OSH, recs dysphagia 3 soft-thin liquids. Reconsulted at [facility] and recommended pt be restarted on normal diet.  -Aspiration precautions

## 2021-10-05 NOTE — PROGRESS NOTE ADULT - PROBLEM SELECTOR PLAN 1
MSSA osteomyelitis s/p R halux amputation with MSSA bacteremia  -Cefazoline 2 g q8h until 10/14 to then be followed by keflex for additional 2 weeks 10/15-10/28  - Repeat BCx from 9/16 NGTD  - S/P PICC line placement 9/22 RUE  - Outpatient podiatrist dr Felipe. Podiatry reconsulted. Wound care recs ordered.  -ICD extraction performed 9/28 without complications.  -S/P mediport removal 9/30  -APpreciate ID MSSA osteomyelitis s/p R hallux amputation with MSSA bacteremia  - Cefazoline 2 g q8h until 10/14 to then be followed by keflex for additional 2 weeks 10/15-10/28  - Repeat BCx from 9/16 NGTD  - S/P PICC line placement 9/22 RUE  - Outpatient podiatrist dr Felipe. Podiatry reconsulted. Wound care recs ordered.  -ICD extraction performed 9/28 without complications.  -S/P mediport removal 9/30  -APpreciate ID MSSA osteomyelitis s/p R hallux amputation with MSSA bacteremia  -Cefazoline 2 g q8h until 10/14 to then be followed by keflex for additional 2 weeks 10/15-10/28  - Repeat BCx from 9/16 NGTD  - S/P PICC line placement 9/22 RUE  -Outpatient podiatrist dr Felipe. Podiatry reconsulted. Wound care recs ordered.  -ICD extraction performed 9/28 without complications.  -S/P mediport removal 9/30  -Appreciate ID

## 2021-10-05 NOTE — PROGRESS NOTE ADULT - ASSESSMENT
Mr. Gatica is a 80 year old male with PMH pancreatic cancer (dx 3/2021, currently undergoing chemo), HTN, HLD, CHF (unknown EF), CAD s/p stents x2 (~15 years ago), defibrillator, TAVR (4/2021), bilateral PE (7/2021) on Xarelto, spinal stenosis, GERD, BPH, macular degeneration initially presented to [facility] c/o R foot pain admitted for R hallux pressure injury and RLE cellulitis found to have MSSA bacteremia and acute OM of right hallux. Given recent TAVR, transferred [facility] for CORBIN and consideration of ICD extraction.

## 2021-10-05 NOTE — PROGRESS NOTE ADULT - SUBJECTIVE AND OBJECTIVE BOX
INCOMPLETE NOTE PROGRESS NOTE:     Patient is a 80y old  Male who presents with a chief complaint of osteomylitis, c/f endocarditis (05 Oct 2021 13:31)      SUBJECTIVE / OVERNIGHT EVENTS:  No acute overnight events.  No palpitations, chest pain, shortness of breath. No light headedness.    ADDITIONAL REVIEW OF SYSTEMS:  No fevers, chest pain, shortness of breath, abdominal pain, lower extremity swelling or pain, dysuria, or constipation.    MEDICATIONS  (STANDING):  ceFAZolin   IVPB 2000 milliGRAM(s) IV Intermittent every 8 hours  chlorhexidine 4% Liquid 1 Application(s) Topical <User Schedule>  dorzolamide 2%/timolol 0.5% Ophthalmic Solution 1 Drop(s) Both EYES two times a day  DULoxetine 60 milliGRAM(s) Oral daily  gabapentin 300 milliGRAM(s) Oral two times a day  influenza   Vaccine 0.5 milliLiter(s) IntraMuscular once  ketorolac 0.5% Ophthalmic Solution 1 Drop(s) Both EYES daily  latanoprost 0.005% Ophthalmic Solution 1 Drop(s) Both EYES at bedtime  melatonin 6 milliGRAM(s) Oral at bedtime  multivitamin 1 Tablet(s) Oral daily  pancrelipase  (CREON 36,000 Lipase Units) 2 Capsule(s) Oral three times a day with meals  pantoprazole    Tablet 40 milliGRAM(s) Oral before breakfast  pyridoxine 100 milliGRAM(s) Oral daily  sacubitril 24 mG/valsartan 26 mG 1 Tablet(s) Oral two times a day  sotalol 40 milliGRAM(s) Oral every 12 hours  tamsulosin 0.4 milliGRAM(s) Oral at bedtime    MEDICATIONS  (PRN):  acetaminophen   Tablet .. 650 milliGRAM(s) Oral every 4 hours PRN Mild Pain (1 - 3)  ALPRAZolam 0.25 milliGRAM(s) Oral three times a day PRN Anxiety/agitation  HYDROmorphone  Injectable 0.2 milliGRAM(s) IV Push every 10 minutes PRN Moderate Pain (4 - 6)  ondansetron Injectable 8 milliGRAM(s) IV Push once PRN Nausea and/or Vomiting  oxycodone    5 mG/acetaminophen 325 mG 1 Tablet(s) Oral every 4 hours PRN Moderate Pain (4 - 6)  oxycodone    5 mG/acetaminophen 325 mG 2 Tablet(s) Oral every 4 hours PRN Severe Pain (7 - 10)      CAPILLARY BLOOD GLUCOSE        I&O's Summary    04 Oct 2021 07:01  -  05 Oct 2021 07:00  --------------------------------------------------------  IN: 650 mL / OUT: 625 mL / NET: 25 mL    05 Oct 2021 07:01  -  05 Oct 2021 17:49  --------------------------------------------------------  IN: 480 mL / OUT: 0 mL / NET: 480 mL        PHYSICAL EXAM:  Vital Signs Last 24 Hrs  T(C): 36.5 (05 Oct 2021 14:50), Max: 36.7 (05 Oct 2021 05:05)  T(F): 97.7 (05 Oct 2021 14:50), Max: 98 (05 Oct 2021 05:05)  HR: 82 (05 Oct 2021 14:50) (77 - 98)  BP: 101/72 (05 Oct 2021 14:50) (92/57 - 122/62)  BP(mean): --  RR: 18 (05 Oct 2021 14:50) (18 - 18)  SpO2: 99% (05 Oct 2021 14:50) (97% - 99%)    CONSTITUTIONAL: NAD, well-developed  CARDIOVASCULAR: Regular rate and rhythm. Normal S1 and S2. No murmurs.  RESPIRATORY: Normal respiratory effort, Lungs CTAB  CHEST: Chest wounds CDI  EXTREMITIES: No IZZY, peripheral pulses intact.  ABDOMEN: Nontender to palpation, normoactive bowel sounds, no rebound/guarding; No hepatosplenomegaly  MUSCLOSKELETAL: no clubbing or cyanosis of digits; no joint swelling or tenderness to palpation  PSYCH: A+O to person, place, and time; affect appropriate    LABS:                        8.6    4.77  )-----------( 179      ( 05 Oct 2021 06:09 )             26.5     10-05    134<L>  |  103  |  13  ----------------------------<  109<H>  4.1   |  22  |  0.72    Ca    9.0      05 Oct 2021 06:09  Phos  4.3     10-05  Mg     1.7     10-05      PT/INR - ( 04 Oct 2021 07:03 )   PT: 13.6 sec;   INR: 1.14 ratio         PTT - ( 04 Oct 2021 07:03 )  PTT:29.4 sec            RADIOLOGY & ADDITIONAL TESTS:  Results Reviewed: No leukocytosis  Imaging Personally Reviewed:  Electrocardiogram Personally Reviewed:    COORDINATION OF CARE:  Care Discussed with Consultants/Other Providers [Y/N]:  Prior or Outpatient Records Reviewed [Y/N]:

## 2021-10-05 NOTE — PROGRESS NOTE ADULT - PROBLEM SELECTOR PLAN 7
- Diagnosed in March 2021, on chemotherapy (does not recall name of medication)  - Continue home Pancrelipase 36,000 2 tabs PO TID with meals  - Per podiatry, Dr. López spoke with Dr. Murphy at [place]- Whipple to be postponed until after resolution of acute OM infection, but does not need to wait until full course abx completed per ID.   -Pain meds as above  -Dr. Mercer/Surgery consulted for second opinion regarding ipple. Will also obtain preoperative evaluation by cardiology while inpatient - Diagnosed in March 2021, on chemotherapy (does not recall name of medication)  - Continue home Pancrelipase 36,000 2 tabs PO TID with meals  - Per podiatry, Dr. López spoke with Dr. Murphy at [place]- Whipple to be postponed until after resolution of acute OM infection, but does not need to wait until full course abx completed per ID.   -Pain meds as above  -Dr. Mercer/Surgery consulted for second opinion regarding Whipple, follow up outpatient

## 2021-10-05 NOTE — PROGRESS NOTE ADULT - PROBLEM SELECTOR PROBLEM 7
Pancreatic cancer

## 2021-10-06 RX ORDER — RIVAROXABAN 15 MG-20MG
1 KIT ORAL
Qty: 30 | Refills: 0
Start: 2021-10-06 | End: 2021-11-04

## 2021-10-08 ENCOUNTER — OUTPATIENT (OUTPATIENT)
Dept: OUTPATIENT SERVICES | Facility: HOSPITAL | Age: 81
LOS: 1 days | Discharge: HOME | End: 2021-10-08

## 2021-10-08 ENCOUNTER — NON-APPOINTMENT (OUTPATIENT)
Age: 81
End: 2021-10-08

## 2021-10-08 DIAGNOSIS — C25.9 MALIGNANT NEOPLASM OF PANCREAS, UNSPECIFIED: ICD-10-CM

## 2021-10-08 DIAGNOSIS — Z95.810 PRESENCE OF AUTOMATIC (IMPLANTABLE) CARDIAC DEFIBRILLATOR: Chronic | ICD-10-CM

## 2021-10-08 DIAGNOSIS — Z89.421 ACQUIRED ABSENCE OF OTHER RIGHT TOE(S): Chronic | ICD-10-CM

## 2021-10-08 DIAGNOSIS — Z96.60 PRESENCE OF UNSPECIFIED ORTHOPEDIC JOINT IMPLANT: Chronic | ICD-10-CM

## 2021-10-08 DIAGNOSIS — D29.20 BENIGN NEOPLASM OF UNSPECIFIED TESTIS: Chronic | ICD-10-CM

## 2021-10-08 DIAGNOSIS — Z96.649 PRESENCE OF UNSPECIFIED ARTIFICIAL HIP JOINT: Chronic | ICD-10-CM

## 2021-10-08 DIAGNOSIS — Z98.89 OTHER SPECIFIED POSTPROCEDURAL STATES: Chronic | ICD-10-CM

## 2021-10-12 ENCOUNTER — APPOINTMENT (OUTPATIENT)
Dept: MULTI SPECIALTY CLINIC | Facility: CLINIC | Age: 81
End: 2021-10-12

## 2021-10-12 ENCOUNTER — NON-APPOINTMENT (OUTPATIENT)
Age: 81
End: 2021-10-12

## 2021-10-12 ENCOUNTER — OUTPATIENT (OUTPATIENT)
Dept: OUTPATIENT SERVICES | Facility: HOSPITAL | Age: 81
LOS: 1 days | Discharge: HOME | End: 2021-10-12

## 2021-10-12 ENCOUNTER — APPOINTMENT (OUTPATIENT)
Dept: MULTI SPECIALTY CLINIC | Facility: CLINIC | Age: 81
End: 2021-10-12
Payer: MEDICARE

## 2021-10-12 VITALS
SYSTOLIC BLOOD PRESSURE: 115 MMHG | WEIGHT: 210 LBS | HEIGHT: 73 IN | HEART RATE: 84 BPM | OXYGEN SATURATION: 98 % | RESPIRATION RATE: 17 BRPM | BODY MASS INDEX: 27.83 KG/M2 | DIASTOLIC BLOOD PRESSURE: 72 MMHG

## 2021-10-12 DIAGNOSIS — Z98.89 OTHER SPECIFIED POSTPROCEDURAL STATES: Chronic | ICD-10-CM

## 2021-10-12 DIAGNOSIS — Z89.421 ACQUIRED ABSENCE OF OTHER RIGHT TOE(S): Chronic | ICD-10-CM

## 2021-10-12 DIAGNOSIS — Z96.60 PRESENCE OF UNSPECIFIED ORTHOPEDIC JOINT IMPLANT: Chronic | ICD-10-CM

## 2021-10-12 DIAGNOSIS — Z95.810 PRESENCE OF AUTOMATIC (IMPLANTABLE) CARDIAC DEFIBRILLATOR: Chronic | ICD-10-CM

## 2021-10-12 DIAGNOSIS — Z96.649 PRESENCE OF UNSPECIFIED ARTIFICIAL HIP JOINT: Chronic | ICD-10-CM

## 2021-10-12 DIAGNOSIS — D29.20 BENIGN NEOPLASM OF UNSPECIFIED TESTIS: Chronic | ICD-10-CM

## 2021-10-12 DIAGNOSIS — C25.9 MALIGNANT NEOPLASM OF PANCREAS, UNSPECIFIED: ICD-10-CM

## 2021-10-12 PROCEDURE — 99205 OFFICE O/P NEW HI 60 MIN: CPT

## 2021-10-12 PROCEDURE — 99204 OFFICE O/P NEW MOD 45 MIN: CPT

## 2021-10-12 NOTE — ASSESSMENT
[FreeTextEntry1] : 81 y/o man with extensive cardiac history (TAVR 4/2021, bilateral PE 7/2021 on rivaroxaban), s/p ~5 months of gemcitabine+nab-paclitaxel neoadjuvant therapy now with surgically resectable disease, presenting to medical oncology to establish care with [facility] oncology.\par \par He and his family asked many good questions about risk factors a/w pancreatic cancer, the implications of his diagnosis on his well being and prognosis. While concerned that the surgery is not a guarantee of cure, he acknowledged that the operation is our only approach for curative intent and that his response to chemotherapy suggests some degree of chemotherapy sensitivity in his tumor, which is a good prognostic factor. He asked if I would provide adjuvant chemotherapy surgery and I said I would consider it. He has already received, and tolerated, quite a bit of therapy, on the order of 5 months, which approximates our typical delivery of ~6 months of adjuvant chemotherapy. That said, depending on his recovery after the operation, and on the response of his tumor on surgical pathology, and his goals, I would consider continue some form of chemotherapy in the adjuvant setting.\par \par Looking forward to seeing him  and his family again in my clinic a few weeks following surgery. \par \par David Juan MD PhD\par Medical Oncology Attending\par

## 2021-10-12 NOTE — HISTORY OF PRESENT ILLNESS
[de-identified] : 79 y/o man with extensive cardiac history (TAVR 4/2021, bilateral PE 7/2021 on rivaroxaban), s/p ~5 months of gemcitabine+nab-paclitaxel neoadjuvant therapy now with surgically resectable disease, presenting to medical oncology to establish care with [facility] oncology.\par \par Chronological Cancer History:\par 03/02/21 CT AP for abdominal pain & jaundice showed CBD dilitation at head of pancreas\par 03/05/21 EUS showed pancreatic mass\par 03/12/21 CT CAP at [facility] -> staged as resectable disease, planned for Whipple but not cleared from cardiac standpoint\par 03/24/21 underwent TAVR\par 04/15/21 staging laparoscopy saged as borderline resectable\par <when> C1 gemcitabine+nab-paclitaxel (Dr Carrasco)\par 09/07/21 C5 gemcitabine+nab-paclitaxel, courses c/b neutropenia requiring growth factor\par 09/26/21 admission for right foot big toe osteomyelitis s/p amputation\par \par Family cancer hx:  Father- prostate ;mother ovarian?\par

## 2021-10-13 ENCOUNTER — RESULT REVIEW (OUTPATIENT)
Age: 81
End: 2021-10-13

## 2021-10-13 ENCOUNTER — NON-APPOINTMENT (OUTPATIENT)
Age: 81
End: 2021-10-13

## 2021-10-14 ENCOUNTER — RESULT CHARGE (OUTPATIENT)
Age: 81
End: 2021-10-14

## 2021-10-15 ENCOUNTER — APPOINTMENT (OUTPATIENT)
Dept: ELECTROPHYSIOLOGY | Facility: CLINIC | Age: 81
End: 2021-10-15
Payer: MEDICARE

## 2021-10-15 ENCOUNTER — NON-APPOINTMENT (OUTPATIENT)
Age: 81
End: 2021-10-15

## 2021-10-15 VITALS
HEIGHT: 73 IN | HEART RATE: 75 BPM | SYSTOLIC BLOOD PRESSURE: 116 MMHG | OXYGEN SATURATION: 98 % | DIASTOLIC BLOOD PRESSURE: 66 MMHG

## 2021-10-15 PROCEDURE — 99024 POSTOP FOLLOW-UP VISIT: CPT

## 2021-10-15 PROCEDURE — 93000 ELECTROCARDIOGRAM COMPLETE: CPT | Mod: 59

## 2021-10-15 RX ORDER — CEPHALEXIN 500 MG
1 CAPSULE ORAL
Qty: 28 | Refills: 0
Start: 2021-10-15 | End: 2021-10-28

## 2021-10-18 ENCOUNTER — NON-APPOINTMENT (OUTPATIENT)
Age: 81
End: 2021-10-18

## 2021-10-18 ENCOUNTER — APPOINTMENT (OUTPATIENT)
Dept: CARDIOLOGY | Facility: CLINIC | Age: 81
End: 2021-10-18
Payer: MEDICARE

## 2021-10-18 VITALS
HEIGHT: 73 IN | HEART RATE: 90 BPM | OXYGEN SATURATION: 97 % | DIASTOLIC BLOOD PRESSURE: 55 MMHG | BODY MASS INDEX: 27.7 KG/M2 | SYSTOLIC BLOOD PRESSURE: 113 MMHG | WEIGHT: 209 LBS

## 2021-10-18 LAB — NON-GYNECOLOGICAL CYTOLOGY STUDY: SIGNIFICANT CHANGE UP

## 2021-10-18 PROCEDURE — 93000 ELECTROCARDIOGRAM COMPLETE: CPT

## 2021-10-18 PROCEDURE — 99215 OFFICE O/P EST HI 40 MIN: CPT

## 2021-10-18 RX ORDER — ELECTROLYTES/DEXTROSE
100 SOLUTION, ORAL ORAL
Refills: 0 | Status: ACTIVE | COMMUNITY

## 2021-10-18 RX ORDER — DULOXETINE HYDROCHLORIDE 30 MG/1
CAPSULE, DELAYED RELEASE ORAL DAILY
Refills: 0 | Status: DISCONTINUED | COMMUNITY
End: 2021-10-18

## 2021-10-18 RX ORDER — MELATONIN 3 MG
TABLET ORAL
Refills: 0 | Status: ACTIVE | COMMUNITY

## 2021-10-18 RX ORDER — SACUBITRIL AND VALSARTAN 49; 51 MG/1; MG/1
49-51 TABLET, FILM COATED ORAL
Refills: 0 | Status: DISCONTINUED | COMMUNITY
End: 2021-10-18

## 2021-10-18 RX ORDER — BENZONATATE 100 MG/1
100 CAPSULE ORAL
Refills: 0 | Status: DISCONTINUED | COMMUNITY
End: 2021-10-18

## 2021-10-18 RX ORDER — NEBIVOLOL HYDROCHLORIDE 5 MG/1
5 TABLET ORAL
Refills: 0 | Status: DISCONTINUED | COMMUNITY
End: 2021-10-18

## 2021-10-18 RX ORDER — UBIDECARENONE 50 MG
TABLET ORAL DAILY
Refills: 0 | Status: ACTIVE | COMMUNITY

## 2021-10-18 RX ORDER — SOTALOL HYDROCHLORIDE 80 MG/1
80 TABLET ORAL
Refills: 0 | Status: DISCONTINUED | COMMUNITY
End: 2021-10-18

## 2021-10-18 RX ORDER — RIVAROXABAN 15 MG/1
15 TABLET, FILM COATED ORAL
Refills: 0 | Status: DISCONTINUED | COMMUNITY
End: 2021-10-18

## 2021-10-18 RX ORDER — AMOXICILLIN AND CLAVULANATE POTASSIUM 875; 125 MG/1; 1/1
875-125 TABLET, FILM COATED ORAL TWICE DAILY
Refills: 0 | Status: DISCONTINUED | COMMUNITY
End: 2021-10-18

## 2021-10-18 RX ORDER — DORZOLAMIDE HYDROCHLORIDE 20 MG/ML
2 SOLUTION OPHTHALMIC
Refills: 0 | Status: ACTIVE | COMMUNITY

## 2021-10-19 ENCOUNTER — APPOINTMENT (OUTPATIENT)
Dept: WOUND CARE | Facility: CLINIC | Age: 81
End: 2021-10-19
Payer: MEDICARE

## 2021-10-19 DIAGNOSIS — M54.16 RADICULOPATHY, LUMBAR REGION: ICD-10-CM

## 2021-10-19 DIAGNOSIS — L97.512 NON-PRESSURE CHRONIC ULCER OF OTHER PART OF RIGHT FOOT WITH FAT LAYER EXPOSED: ICD-10-CM

## 2021-10-19 PROCEDURE — 99203 OFFICE O/P NEW LOW 30 MIN: CPT

## 2021-10-19 PROCEDURE — 99213 OFFICE O/P EST LOW 20 MIN: CPT

## 2021-10-19 PROCEDURE — 11042 DBRDMT SUBQ TIS 1ST 20SQCM/<: CPT

## 2021-10-20 ENCOUNTER — OUTPATIENT (OUTPATIENT)
Dept: OUTPATIENT SERVICES | Facility: HOSPITAL | Age: 81
LOS: 1 days | End: 2021-10-20
Payer: COMMERCIAL

## 2021-10-20 VITALS
DIASTOLIC BLOOD PRESSURE: 60 MMHG | RESPIRATION RATE: 16 BRPM | WEIGHT: 210.1 LBS | HEART RATE: 79 BPM | TEMPERATURE: 98 F | OXYGEN SATURATION: 98 % | SYSTOLIC BLOOD PRESSURE: 99 MMHG | HEIGHT: 74 IN

## 2021-10-20 DIAGNOSIS — Z29.9 ENCOUNTER FOR PROPHYLACTIC MEASURES, UNSPECIFIED: ICD-10-CM

## 2021-10-20 DIAGNOSIS — Z96.60 PRESENCE OF UNSPECIFIED ORTHOPEDIC JOINT IMPLANT: Chronic | ICD-10-CM

## 2021-10-20 DIAGNOSIS — Z98.89 OTHER SPECIFIED POSTPROCEDURAL STATES: Chronic | ICD-10-CM

## 2021-10-20 DIAGNOSIS — Z87.81 PERSONAL HISTORY OF (HEALED) TRAUMATIC FRACTURE: Chronic | ICD-10-CM

## 2021-10-20 DIAGNOSIS — Z96.649 PRESENCE OF UNSPECIFIED ARTIFICIAL HIP JOINT: Chronic | ICD-10-CM

## 2021-10-20 DIAGNOSIS — C25.9 MALIGNANT NEOPLASM OF PANCREAS, UNSPECIFIED: ICD-10-CM

## 2021-10-20 DIAGNOSIS — I26.99 OTHER PULMONARY EMBOLISM WITHOUT ACUTE COR PULMONALE: ICD-10-CM

## 2021-10-20 DIAGNOSIS — Z90.410 ACQUIRED TOTAL ABSENCE OF PANCREAS: Chronic | ICD-10-CM

## 2021-10-20 DIAGNOSIS — Z95.2 PRESENCE OF PROSTHETIC HEART VALVE: Chronic | ICD-10-CM

## 2021-10-20 DIAGNOSIS — Z95.810 PRESENCE OF AUTOMATIC (IMPLANTABLE) CARDIAC DEFIBRILLATOR: Chronic | ICD-10-CM

## 2021-10-20 DIAGNOSIS — Z89.421 ACQUIRED ABSENCE OF OTHER RIGHT TOE(S): Chronic | ICD-10-CM

## 2021-10-20 DIAGNOSIS — I10 ESSENTIAL (PRIMARY) HYPERTENSION: ICD-10-CM

## 2021-10-20 DIAGNOSIS — Z01.818 ENCOUNTER FOR OTHER PREPROCEDURAL EXAMINATION: ICD-10-CM

## 2021-10-20 DIAGNOSIS — F32.9 MAJOR DEPRESSIVE DISORDER, SINGLE EPISODE, UNSPECIFIED: ICD-10-CM

## 2021-10-20 DIAGNOSIS — D29.20 BENIGN NEOPLASM OF UNSPECIFIED TESTIS: Chronic | ICD-10-CM

## 2021-10-20 DIAGNOSIS — I42.9 CARDIOMYOPATHY, UNSPECIFIED: ICD-10-CM

## 2021-10-20 DIAGNOSIS — K21.9 GASTRO-ESOPHAGEAL REFLUX DISEASE WITHOUT ESOPHAGITIS: ICD-10-CM

## 2021-10-20 LAB
ALBUMIN SERPL ELPH-MCNC: 4.1 G/DL — SIGNIFICANT CHANGE UP (ref 3.3–5)
ALP SERPL-CCNC: 94 U/L — SIGNIFICANT CHANGE UP (ref 40–120)
ALT FLD-CCNC: 5 U/L — LOW (ref 10–45)
ANION GAP SERPL CALC-SCNC: 12 MMOL/L — SIGNIFICANT CHANGE UP (ref 5–17)
AST SERPL-CCNC: 25 U/L — SIGNIFICANT CHANGE UP (ref 10–40)
BILIRUB SERPL-MCNC: 0.4 MG/DL — SIGNIFICANT CHANGE UP (ref 0.2–1.2)
BLD GP AB SCN SERPL QL: NEGATIVE — SIGNIFICANT CHANGE UP
BUN SERPL-MCNC: 17 MG/DL — SIGNIFICANT CHANGE UP (ref 7–23)
CALCIUM SERPL-MCNC: 9.4 MG/DL — SIGNIFICANT CHANGE UP (ref 8.4–10.5)
CHLORIDE SERPL-SCNC: 97 MMOL/L — SIGNIFICANT CHANGE UP (ref 96–108)
CO2 SERPL-SCNC: 25 MMOL/L — SIGNIFICANT CHANGE UP (ref 22–31)
CREAT SERPL-MCNC: 0.84 MG/DL — SIGNIFICANT CHANGE UP (ref 0.5–1.3)
GLUCOSE SERPL-MCNC: 127 MG/DL — HIGH (ref 70–99)
HCT VFR BLD CALC: 29 % — LOW (ref 39–50)
HGB BLD-MCNC: 8.8 G/DL — LOW (ref 13–17)
MCHC RBC-ENTMCNC: 30.3 GM/DL — LOW (ref 32–36)
MCHC RBC-ENTMCNC: 32.5 PG — SIGNIFICANT CHANGE UP (ref 27–34)
MCV RBC AUTO: 107 FL — HIGH (ref 80–100)
NRBC # BLD: 0 /100 WBCS — SIGNIFICANT CHANGE UP (ref 0–0)
PLATELET # BLD AUTO: 164 K/UL — SIGNIFICANT CHANGE UP (ref 150–400)
POTASSIUM SERPL-MCNC: 5 MMOL/L — SIGNIFICANT CHANGE UP (ref 3.5–5.3)
POTASSIUM SERPL-SCNC: 5 MMOL/L — SIGNIFICANT CHANGE UP (ref 3.5–5.3)
PROT SERPL-MCNC: 7.4 G/DL — SIGNIFICANT CHANGE UP (ref 6–8.3)
RBC # BLD: 2.71 M/UL — LOW (ref 4.2–5.8)
RBC # FLD: 14.5 % — SIGNIFICANT CHANGE UP (ref 10.3–14.5)
RH IG SCN BLD-IMP: POSITIVE — SIGNIFICANT CHANGE UP
SODIUM SERPL-SCNC: 134 MMOL/L — LOW (ref 135–145)
WBC # BLD: 4.61 K/UL — SIGNIFICANT CHANGE UP (ref 3.8–10.5)
WBC # FLD AUTO: 4.61 K/UL — SIGNIFICANT CHANGE UP (ref 3.8–10.5)

## 2021-10-20 PROCEDURE — 80053 COMPREHEN METABOLIC PANEL: CPT

## 2021-10-20 PROCEDURE — 85027 COMPLETE CBC AUTOMATED: CPT

## 2021-10-20 PROCEDURE — 86900 BLOOD TYPING SEROLOGIC ABO: CPT

## 2021-10-20 PROCEDURE — 86850 RBC ANTIBODY SCREEN: CPT

## 2021-10-20 PROCEDURE — 86901 BLOOD TYPING SEROLOGIC RH(D): CPT

## 2021-10-20 PROCEDURE — 86923 COMPATIBILITY TEST ELECTRIC: CPT

## 2021-10-20 PROCEDURE — G0463: CPT

## 2021-10-20 RX ORDER — CEFOTETAN DISODIUM 1 G
2 VIAL (EA) INJECTION ONCE
Refills: 0 | Status: DISCONTINUED | OUTPATIENT
Start: 2021-10-26 | End: 2021-10-26

## 2021-10-20 NOTE — H&P PST ADULT - ASSESSMENT
ABELARDOI VTE 2.0 SCORE [CLOT updated 2019]    AGE RELATED RISK FACTORS                                                       MOBILITY RELATED FACTORS  [ ] Age 41-60 years                                            (1 Point)                    [ ] Bed rest                                                        (1 Point)  [ ] Age: 61-74 years                                           (2 Points)                  [ ] Plaster cast                                                   (2 Points)  [ ] Age= 75 years                                              (3 Points)                    [ ] Bed bound for more than 72 hours                 (2 Points)    DISEASE RELATED RISK FACTORS                                               GENDER SPECIFIC FACTORS  [ ] Edema in the lower extremities                       (1 Point)              [ ] Pregnancy                                                     (1 Point)  [ ] Varicose veins                                               (1 Point)                     [ ] Post-partum < 6 weeks                                   (1 Point)             [ ] BMI > 25 Kg/m2                                            (1 Point)                     [ ] Hormonal therapy  or oral contraception          (1 Point)                 [ ] Sepsis (in the previous month)                        (1 Point)               [ ] History of pregnancy complications                 (1 point)  [ ] Pneumonia or serious lung disease                                               [ ] Unexplained or recurrent                     (1 Point)           (in the previous month)                               (1 Point)  [ ] Abnormal pulmonary function test                     (1 Point)                 SURGERY RELATED RISK FACTORS  [ ] Acute myocardial infarction                              (1 Point)               [ ]  Section                                             (1 Point)  [ ] Congestive heart failure (in the previous month)  (1 Point)      [ ] Minor surgery                                                  (1 Point)   [ ] Inflammatory bowel disease                             (1 Point)               [ ] Arthroscopic surgery                                        (2 Points)  [ ] Central venous access                                      (2 Points)                [ ] General surgery lasting more than 45 minutes (2 points)  [ ] Malignancy- Present or previous                   (2 Points)                [ ] Elective arthroplasty                                         (5 points)    [ ] Stroke (in the previous month)                          (5 Points)                                                                                                                                                           HEMATOLOGY RELATED FACTORS                                                 TRAUMA RELATED RISK FACTORS  [ ] Prior episodes of VTE                                     (3 Points)                [ ] Fracture of the hip, pelvis, or leg                       (5 Points)  [ ] Positive family history for VTE                         (3 Points)             [ ] Acute spinal cord injury (in the previous month)  (5 Points)  [ ] Prothrombin 86370 A                                     (3 Points)               [ ] Paralysis  (less than 1 month)                             (5 Points)  [ ] Factor V Leiden                                             (3 Points)                  [ ] Multiple Trauma within 1 month                        (5 Points)  [ ] Lupus anticoagulants                                     (3 Points)                                                           [ ] Anticardiolipin antibodies                               (3 Points)                                                       [ ] High homocysteine in the blood                      (3 Points)                                             [ ] Other congenital or acquired thrombophilia      (3 Points)                                                [ ] Heparin induced thrombocytopenia                  (3 Points)                                     Total Score [   9       ]

## 2021-10-20 NOTE — H&P PST ADULT - PROBLEM SELECTOR PLAN 5
- ICD placed >20 years ago, pt had a very fast HR in the setting of a viral infection, but unclear exactly what arrhythmia  - Denied cardiac arrest, suspect sustained VT with pulse and had ICD placement  - Continue sotalol BID  -ICD interrogated at OSH, no shocks recorded, ICD functioning battery life 2.5 years; Vpaced <1%  - Consideration of ICD extraction as above  -Pt with apparent AICD fired x 1 overnight 9/25, pt asymptomatic, denied chest pain other than feeling shock. Will monitor. Currently sinus with some v-pacing on tele -c/w prilosec

## 2021-10-20 NOTE — H&P PST ADULT - NSICDXPASTSURGICALHX_GEN_ALL_CORE_FT
PAST SURGICAL HISTORY:  Benign testicular tumor radiation    H/O Whipple procedure 2/2021    History of laminectomy X3    History of total hip replacement left X 1, 2 revisions    ICD (implantable cardiac defibrillator) in place implanted 2005, replaced 9/2021    S/P TAVR (transcatheter aortic valve replacement) 7/2021    Status post amputation of toe of right foot 8/2021    Status post fracture of femur 2017 left with hardware     PAST SURGICAL HISTORY:  Benign testicular tumor radiation    H/O Whipple procedure 2/2021    History of laminectomy X3    History of total hip replacement left X 1, 2 revisions    ICD (implantable cardiac defibrillator) in place implanted 2005, replaced 10/4/2021 [employer] Subcutaneous ICD    S/P TAVR (transcatheter aortic valve replacement) 7/2021    Status post amputation of toe of right foot 8/2021    Status post fracture of femur 2017 left with hardware

## 2021-10-20 NOTE — H&P PST ADULT - PROBLEM SELECTOR PLAN 2
Currently saturating well on Room air without evidence of acute volume overload.   - TTE 9/18 not well visualized LV, severe Mitral stenosis, left atrial enlargement   - F/U CORBIN tomorrow  - continue sotalol with hold parameters  - continue entresto with hold parameters  - continue lasix with hold parameters\  - Monitor and replete lytes, keep K>4, Mg>2.  - Strict Is/Os, daily weights. -stop Xarelto on 10/23/21, last dose 10/22/21

## 2021-10-20 NOTE — H&P PST ADULT - NSICDXPASTMEDICALHX_GEN_ALL_CORE_FT
PAST MEDICAL HISTORY:  Acute osteomyelitis     Cardiomyopathy     GERD (gastroesophageal reflux disease)     HLD (hyperlipidemia)     HTN (hypertension)     MSSA bacteremia 9/2021    Pancreas cancer chemo    Pulmonary embolism      PAST MEDICAL HISTORY:  Acute osteomyelitis     Cardiomyopathy     GERD (gastroesophageal reflux disease)     HLD (hyperlipidemia)     [place] (hard of hearing) B/L hearing aids    HTN (hypertension)     MSSA bacteremia 9/2021    Pancreas cancer chemo    Pulmonary embolism

## 2021-10-20 NOTE — H&P PST ADULT - PROBLEM SELECTOR PLAN 6
- Chronic, s/p cardiac stents x2 ~15 years ago  - Cardiology following, will appreciate recs The Caprini score indicates that this patient is at high risk for a VTE event (score 6 or greater). Surgical patients in this group will benefit from both pharmacologic prophylaxis and intermittent compression devices.  The surgical team will determine the balance between VTE risk and bleeding risk, and other clinical considerations

## 2021-10-20 NOTE — H&P PST ADULT - HISTORY OF PRESENT ILLNESS
Mr. Gatica is a 80 year old male with PMH pancreatic cancer (dx 3/2021, currently undergoing chemo), HTN, HLD, CHF (unknown EF), CAD s/p stents x2 (~15 years ago), defibrillator, TAVR (4/2021), bilateral PE (7/2021) on Xarelto, spinal stenosis, GERD, BPH, macular degeneration initially presented to [facility] c/o R foot pain admitted for R hallux pressure injury and RLE cellulitis found to have MSSA bacteremia and acute OM of right hallux. Given recent TAVR, transfered [facility] for CORBIN and consideration of ICD extraction. At [place], S/p R. hallux amputation 9/20 and RRT 9/23 for ruptured hematoma @ surgical site during PT, wound irrigated and packed, left open to heal by secondary intention. Bcx 9/15 growing MSSA, with clearance on Bcx 9/16. Currently, pt feels well. Notes some numbness/soreness at amputation site but otherwise ROS negative.  Mr. Gatica is a 80 year old male (from [facility]) with PMH pancreatic cancer (dx 3/2021, chemo) s/p ERCP s/p whipple 3/2021, HTN, HLD, CHF (EF: 35%), CAD s/p stents x2 (~15 years ago), AICD (implanted 2005, extracted and re-implantation 9/2021), TAVR (4/2021), bilateral PE (7/2021) on Xarelto, spinal stenosis, macular degeneration, GERD, BPH, Left THR (x2 revisions), left femur fracture (hardware), Osteomyelitis right foot s/p right hallux amputation 9/2021, MSSA bacteremia, PICC Line RUE was in IV antibiotics, recently changed to PO. Pt denies n/v, abdominal pain, or changes in bowel habits. Pt evaluated by Dr. Mercer for a scheduled Whipple procedure on 10/26/21. Pt denies recent travel or sick contact.     **Covid swab on 10/23/21 at [facility]   Mr. Gatica is a 80 year old male (from [facility]) with PMH pancreatic cancer (dx 3/2021, chemo) s/p ERCP s/p whipple 3/2021, HTN, HLD, CHF (EF: 35%), CAD s/p stents x2 (~15 years ago), AICD (implanted 2005, extracted and re-implantation 9/2021), TAVR (4/2021), bilateral PE (7/2021) on Xarelto, spinal stenosis, macular degeneration, GERD, BPH, Left THR (x2 revisions), left femur fracture (hardware), Osteomyelitis right foot s/p right hallux amputation 9/2021, MSSA bacteremia, PICC Line RUE was on IV antibiotics, recently changed to PO. Pt denies n/v, abdominal pain, or changes in bowel habits. Pt evaluated by Dr. Mercer for a scheduled Whipple procedure on 10/26/21. Pt denies recent travel or sick contact.     **Covid swab on 10/23/21 at [facility]   Mr. Gatica is a 80 year old male (from [facility]) with PMH pancreatic cancer (dx 3/2021, chemo) s/p ERCP s/p whipple 3/2021, HTN, HLD, CHF (EF: 35%), CAD s/p stents x2 (~15 years ago), AICD (implanted 2005, extracted and re-implantation 9/2021), TAVR (4/2021), bilateral PE (7/2021) on Xarelto, spinal stenosis, macular degeneration, GERD, BPH, Left THR (x2 revisions), left femur fracture (hardware), Osteomyelitis right foot s/p right hallux amputation 9/2021, MSSA bacteremia, explantation and reimplantation of AICD 10/4/21, PICC Line RUE was on IV antibiotics, recently changed to PO. Pt denies n/v, abdominal pain, or changes in bowel habits. Pt evaluated by Dr. Mercer for a scheduled Whipple procedure on 10/26/21. Pt denies recent travel or sick contact.     **Covid swab on 10/23/21 at [facility]

## 2021-10-20 NOTE — H&P PST ADULT - PROBLEM SELECTOR PLAN 4
Likely anemia of chronic disease in setting of pancreatic cancer  - H/h stable  - Currently hemodynamically stable, monitor for signs and symptoms of active bleeding especially while on therapeutic AC  - Transfuse for hemoglobin <8 -c/w duloxetine

## 2021-10-20 NOTE — H&P PST ADULT - PROBLEM SELECTOR PLAN 3
- Bilateral PE in 7/2020, on Xarelto   - Holding xarelto, on heparin drip in setting of upcoming procedure  -Will restart xarelto when able. -c/w entresto  c/w sotalol

## 2021-10-20 NOTE — H&P PST ADULT - ABILITY TO HEAR (WITH HEARING AID OR HEARING APPLIANCE IF NORMALLY USED):
B/L hearing aids/Mildly to Moderately Impaired: difficulty hearing in some environments or speaker may need to increase volume or speak distinctly

## 2021-10-21 ENCOUNTER — NON-APPOINTMENT (OUTPATIENT)
Age: 81
End: 2021-10-21

## 2021-10-23 ENCOUNTER — OUTPATIENT (OUTPATIENT)
Dept: OUTPATIENT SERVICES | Facility: HOSPITAL | Age: 81
LOS: 1 days | End: 2021-10-23

## 2021-10-23 DIAGNOSIS — Z96.60 PRESENCE OF UNSPECIFIED ORTHOPEDIC JOINT IMPLANT: Chronic | ICD-10-CM

## 2021-10-23 DIAGNOSIS — Z11.52 ENCOUNTER FOR SCREENING FOR COVID-19: ICD-10-CM

## 2021-10-23 DIAGNOSIS — Z95.810 PRESENCE OF AUTOMATIC (IMPLANTABLE) CARDIAC DEFIBRILLATOR: Chronic | ICD-10-CM

## 2021-10-23 DIAGNOSIS — Z98.89 OTHER SPECIFIED POSTPROCEDURAL STATES: Chronic | ICD-10-CM

## 2021-10-23 DIAGNOSIS — Z89.421 ACQUIRED ABSENCE OF OTHER RIGHT TOE(S): Chronic | ICD-10-CM

## 2021-10-23 DIAGNOSIS — D29.20 BENIGN NEOPLASM OF UNSPECIFIED TESTIS: Chronic | ICD-10-CM

## 2021-10-23 DIAGNOSIS — Z87.81 PERSONAL HISTORY OF (HEALED) TRAUMATIC FRACTURE: Chronic | ICD-10-CM

## 2021-10-23 DIAGNOSIS — Z90.410 ACQUIRED TOTAL ABSENCE OF PANCREAS: Chronic | ICD-10-CM

## 2021-10-23 DIAGNOSIS — Z95.2 PRESENCE OF PROSTHETIC HEART VALVE: Chronic | ICD-10-CM

## 2021-10-23 LAB — SARS-COV-2 RNA SPEC QL NAA+PROBE: SIGNIFICANT CHANGE UP

## 2021-10-26 ENCOUNTER — INPATIENT (INPATIENT)
Facility: HOSPITAL | Age: 81
LOS: 19 days | Discharge: NURSING, REHAB OR LONG-TERM CARE | DRG: 405 | End: 2021-11-15
Attending: SURGERY | Admitting: SURGERY
Payer: MEDICARE

## 2021-10-26 ENCOUNTER — RESULT REVIEW (OUTPATIENT)
Age: 81
End: 2021-10-26

## 2021-10-26 ENCOUNTER — APPOINTMENT (OUTPATIENT)
Dept: SURGICAL ONCOLOGY | Facility: HOSPITAL | Age: 81
End: 2021-10-26

## 2021-10-26 VITALS
DIASTOLIC BLOOD PRESSURE: 72 MMHG | HEART RATE: 65 BPM | RESPIRATION RATE: 18 BRPM | HEIGHT: 74 IN | TEMPERATURE: 98 F | OXYGEN SATURATION: 99 % | SYSTOLIC BLOOD PRESSURE: 120 MMHG | WEIGHT: 210.1 LBS

## 2021-10-26 DIAGNOSIS — Z98.89 OTHER SPECIFIED POSTPROCEDURAL STATES: Chronic | ICD-10-CM

## 2021-10-26 DIAGNOSIS — Z87.81 PERSONAL HISTORY OF (HEALED) TRAUMATIC FRACTURE: Chronic | ICD-10-CM

## 2021-10-26 DIAGNOSIS — D29.20 BENIGN NEOPLASM OF UNSPECIFIED TESTIS: Chronic | ICD-10-CM

## 2021-10-26 DIAGNOSIS — Z95.2 PRESENCE OF PROSTHETIC HEART VALVE: Chronic | ICD-10-CM

## 2021-10-26 DIAGNOSIS — Z96.60 PRESENCE OF UNSPECIFIED ORTHOPEDIC JOINT IMPLANT: Chronic | ICD-10-CM

## 2021-10-26 DIAGNOSIS — Z90.410 ACQUIRED TOTAL ABSENCE OF PANCREAS: Chronic | ICD-10-CM

## 2021-10-26 DIAGNOSIS — Z89.421 ACQUIRED ABSENCE OF OTHER RIGHT TOE(S): Chronic | ICD-10-CM

## 2021-10-26 DIAGNOSIS — C25.9 MALIGNANT NEOPLASM OF PANCREAS, UNSPECIFIED: ICD-10-CM

## 2021-10-26 DIAGNOSIS — Z95.810 PRESENCE OF AUTOMATIC (IMPLANTABLE) CARDIAC DEFIBRILLATOR: Chronic | ICD-10-CM

## 2021-10-26 PROBLEM — L97.512 ULCER OF RIGHT FOOT WITH FAT LAYER EXPOSED: Status: ACTIVE | Noted: 2021-10-26

## 2021-10-26 LAB
ALBUMIN SERPL ELPH-MCNC: 3.6 G/DL — SIGNIFICANT CHANGE UP (ref 3.3–5)
ALP SERPL-CCNC: 92 U/L — SIGNIFICANT CHANGE UP (ref 40–120)
ALT FLD-CCNC: 13 U/L — SIGNIFICANT CHANGE UP (ref 10–45)
AMYLASE P1 CFR SERPL: 41 U/L — SIGNIFICANT CHANGE UP (ref 25–125)
ANION GAP SERPL CALC-SCNC: 15 MMOL/L — SIGNIFICANT CHANGE UP (ref 5–17)
APTT BLD: 27.5 SEC — SIGNIFICANT CHANGE UP (ref 27.5–35.5)
AST SERPL-CCNC: 36 U/L — SIGNIFICANT CHANGE UP (ref 10–40)
BILIRUB DIRECT SERPL-MCNC: 0.3 MG/DL — HIGH (ref 0–0.2)
BILIRUB INDIRECT FLD-MCNC: 0.3 MG/DL — SIGNIFICANT CHANGE UP (ref 0.2–1)
BILIRUB SERPL-MCNC: 0.6 MG/DL — SIGNIFICANT CHANGE UP (ref 0.2–1.2)
BUN SERPL-MCNC: 11 MG/DL — SIGNIFICANT CHANGE UP (ref 7–23)
CALCIUM SERPL-MCNC: 8.7 MG/DL — SIGNIFICANT CHANGE UP (ref 8.4–10.5)
CHLORIDE SERPL-SCNC: 104 MMOL/L — SIGNIFICANT CHANGE UP (ref 96–108)
CO2 SERPL-SCNC: 19 MMOL/L — LOW (ref 22–31)
CREAT SERPL-MCNC: 0.69 MG/DL — SIGNIFICANT CHANGE UP (ref 0.5–1.3)
GAS PNL BLDA: SIGNIFICANT CHANGE UP
GLUCOSE BLDC GLUCOMTR-MCNC: 122 MG/DL — HIGH (ref 70–99)
GLUCOSE BLDC GLUCOMTR-MCNC: 139 MG/DL — HIGH (ref 70–99)
GLUCOSE SERPL-MCNC: 129 MG/DL — HIGH (ref 70–99)
HCT VFR BLD CALC: 32.8 % — LOW (ref 39–50)
HGB BLD-MCNC: 10.1 G/DL — LOW (ref 13–17)
INR BLD: 1.13 RATIO — SIGNIFICANT CHANGE UP (ref 0.88–1.16)
MAGNESIUM SERPL-MCNC: 1.8 MG/DL — SIGNIFICANT CHANGE UP (ref 1.6–2.6)
MCHC RBC-ENTMCNC: 30.8 GM/DL — LOW (ref 32–36)
MCHC RBC-ENTMCNC: 31.9 PG — SIGNIFICANT CHANGE UP (ref 27–34)
MCV RBC AUTO: 103.5 FL — HIGH (ref 80–100)
NRBC # BLD: 0 /100 WBCS — SIGNIFICANT CHANGE UP (ref 0–0)
PHOSPHATE SERPL-MCNC: 4.7 MG/DL — HIGH (ref 2.5–4.5)
PLATELET # BLD AUTO: 157 K/UL — SIGNIFICANT CHANGE UP (ref 150–400)
POTASSIUM SERPL-MCNC: 4.1 MMOL/L — SIGNIFICANT CHANGE UP (ref 3.5–5.3)
POTASSIUM SERPL-SCNC: 4.1 MMOL/L — SIGNIFICANT CHANGE UP (ref 3.5–5.3)
PROT SERPL-MCNC: 6.6 G/DL — SIGNIFICANT CHANGE UP (ref 6–8.3)
PROTHROM AB SERPL-ACNC: 13.5 SEC — SIGNIFICANT CHANGE UP (ref 10.6–13.6)
RBC # BLD: 3.17 M/UL — LOW (ref 4.2–5.8)
RBC # FLD: 14.2 % — SIGNIFICANT CHANGE UP (ref 10.3–14.5)
SODIUM SERPL-SCNC: 138 MMOL/L — SIGNIFICANT CHANGE UP (ref 135–145)
WBC # BLD: 9.45 K/UL — SIGNIFICANT CHANGE UP (ref 3.8–10.5)
WBC # FLD AUTO: 9.45 K/UL — SIGNIFICANT CHANGE UP (ref 3.8–10.5)

## 2021-10-26 PROCEDURE — 93312 ECHO TRANSESOPHAGEAL: CPT

## 2021-10-26 PROCEDURE — 87116 MYCOBACTERIA CULTURE: CPT

## 2021-10-26 PROCEDURE — 85610 PROTHROMBIN TIME: CPT

## 2021-10-26 PROCEDURE — 85730 THROMBOPLASTIN TIME PARTIAL: CPT

## 2021-10-26 PROCEDURE — 84100 ASSAY OF PHOSPHORUS: CPT

## 2021-10-26 PROCEDURE — C1889: CPT

## 2021-10-26 PROCEDURE — 99223 1ST HOSP IP/OBS HIGH 75: CPT | Mod: GC

## 2021-10-26 PROCEDURE — 88309 TISSUE EXAM BY PATHOLOGIST: CPT | Mod: 26

## 2021-10-26 PROCEDURE — 76000 FLUOROSCOPY <1 HR PHYS/QHP: CPT

## 2021-10-26 PROCEDURE — 35221 RPR BLD VSL DIR INTRA-ABDL: CPT | Mod: 59

## 2021-10-26 PROCEDURE — C9399: CPT

## 2021-10-26 PROCEDURE — 93320 DOPPLER ECHO COMPLETE: CPT

## 2021-10-26 PROCEDURE — 86923 COMPATIBILITY TEST ELECTRIC: CPT

## 2021-10-26 PROCEDURE — 87075 CULTR BACTERIA EXCEPT BLOOD: CPT

## 2021-10-26 PROCEDURE — 87070 CULTURE OTHR SPECIMN AEROBIC: CPT

## 2021-10-26 PROCEDURE — 88342 IMHCHEM/IMCYTCHM 1ST ANTB: CPT | Mod: 26

## 2021-10-26 PROCEDURE — U0003: CPT

## 2021-10-26 PROCEDURE — C1894: CPT

## 2021-10-26 PROCEDURE — C1769: CPT

## 2021-10-26 PROCEDURE — C1773: CPT

## 2021-10-26 PROCEDURE — C2629: CPT

## 2021-10-26 PROCEDURE — 88341 IMHCHEM/IMCYTCHM EA ADD ANTB: CPT | Mod: 26

## 2021-10-26 PROCEDURE — 77001 FLUOROGUIDE FOR VEIN DEVICE: CPT

## 2021-10-26 PROCEDURE — 71046 X-RAY EXAM CHEST 2 VIEWS: CPT

## 2021-10-26 PROCEDURE — U0005: CPT

## 2021-10-26 PROCEDURE — 97607 NEG PRS WND THR NDME<=50SQCM: CPT

## 2021-10-26 PROCEDURE — 83735 ASSAY OF MAGNESIUM: CPT

## 2021-10-26 PROCEDURE — 74177 CT ABD & PELVIS W/CONTRAST: CPT

## 2021-10-26 PROCEDURE — 86850 RBC ANTIBODY SCREEN: CPT

## 2021-10-26 PROCEDURE — 87206 SMEAR FLUORESCENT/ACID STAI: CPT

## 2021-10-26 PROCEDURE — 82378 CARCINOEMBRYONIC ANTIGEN: CPT

## 2021-10-26 PROCEDURE — 86769 SARS-COV-2 COVID-19 ANTIBODY: CPT

## 2021-10-26 PROCEDURE — 36590 REMOVAL TUNNELED CV CATH: CPT

## 2021-10-26 PROCEDURE — 93005 ELECTROCARDIOGRAM TRACING: CPT

## 2021-10-26 PROCEDURE — 85027 COMPLETE CBC AUTOMATED: CPT

## 2021-10-26 PROCEDURE — 49905 OMENTAL FLAP INTRA-ABDOM: CPT

## 2021-10-26 PROCEDURE — 88305 TISSUE EXAM BY PATHOLOGIST: CPT | Mod: 26

## 2021-10-26 PROCEDURE — 86901 BLOOD TYPING SEROLOGIC RH(D): CPT

## 2021-10-26 PROCEDURE — 97161 PT EVAL LOW COMPLEX 20 MIN: CPT

## 2021-10-26 PROCEDURE — 92526 ORAL FUNCTION THERAPY: CPT

## 2021-10-26 PROCEDURE — 80048 BASIC METABOLIC PNL TOTAL CA: CPT

## 2021-10-26 PROCEDURE — 93325 DOPPLER ECHO COLOR FLOW MAPG: CPT

## 2021-10-26 PROCEDURE — 86900 BLOOD TYPING SEROLOGIC ABO: CPT

## 2021-10-26 PROCEDURE — 92610 EVALUATE SWALLOWING FUNCTION: CPT

## 2021-10-26 PROCEDURE — 36415 COLL VENOUS BLD VENIPUNCTURE: CPT

## 2021-10-26 PROCEDURE — 48150 PARTIAL REMOVAL OF PANCREAS: CPT

## 2021-10-26 PROCEDURE — 88304 TISSUE EXAM BY PATHOLOGIST: CPT | Mod: 26

## 2021-10-26 PROCEDURE — 87102 FUNGUS ISOLATION CULTURE: CPT

## 2021-10-26 PROCEDURE — 86301 IMMUNOASSAY TUMOR CA 19-9: CPT

## 2021-10-26 PROCEDURE — 88313 SPECIAL STAINS GROUP 2: CPT | Mod: 26

## 2021-10-26 RX ORDER — ONDANSETRON 8 MG/1
4 TABLET, FILM COATED ORAL EVERY 6 HOURS
Refills: 0 | Status: DISCONTINUED | OUTPATIENT
Start: 2021-10-26 | End: 2021-10-27

## 2021-10-26 RX ORDER — SODIUM CHLORIDE 9 MG/ML
1000 INJECTION, SOLUTION INTRAVENOUS
Refills: 0 | Status: DISCONTINUED | OUTPATIENT
Start: 2021-10-26 | End: 2021-10-27

## 2021-10-26 RX ORDER — INFLUENZA VIRUS VACCINE 15; 15; 15; 15 UG/.5ML; UG/.5ML; UG/.5ML; UG/.5ML
0.5 SUSPENSION INTRAMUSCULAR ONCE
Refills: 0 | Status: DISCONTINUED | OUTPATIENT
Start: 2021-10-26 | End: 2021-11-15

## 2021-10-26 RX ORDER — PANTOPRAZOLE SODIUM 20 MG/1
40 TABLET, DELAYED RELEASE ORAL DAILY
Refills: 0 | Status: DISCONTINUED | OUTPATIENT
Start: 2021-10-26 | End: 2021-10-27

## 2021-10-26 RX ORDER — ACETAMINOPHEN 500 MG
1000 TABLET ORAL ONCE
Refills: 0 | Status: COMPLETED | OUTPATIENT
Start: 2021-10-26 | End: 2021-10-26

## 2021-10-26 RX ORDER — HYDROMORPHONE HYDROCHLORIDE 2 MG/ML
0.5 INJECTION INTRAMUSCULAR; INTRAVENOUS; SUBCUTANEOUS
Refills: 0 | Status: DISCONTINUED | OUTPATIENT
Start: 2021-10-26 | End: 2021-10-27

## 2021-10-26 RX ORDER — DIPHENHYDRAMINE HCL 50 MG
25 CAPSULE ORAL EVERY 4 HOURS
Refills: 0 | Status: DISCONTINUED | OUTPATIENT
Start: 2021-10-26 | End: 2021-11-09

## 2021-10-26 RX ORDER — INSULIN LISPRO 100/ML
VIAL (ML) SUBCUTANEOUS EVERY 6 HOURS
Refills: 0 | Status: DISCONTINUED | OUTPATIENT
Start: 2021-10-26 | End: 2021-10-27

## 2021-10-26 RX ORDER — METHADONE HYDROCHLORIDE 40 MG/1
50 TABLET ORAL ONCE
Refills: 0 | Status: DISCONTINUED | OUTPATIENT
Start: 2021-10-26 | End: 2021-10-26

## 2021-10-26 RX ORDER — ONDANSETRON 8 MG/1
4 TABLET, FILM COATED ORAL ONCE
Refills: 0 | Status: DISCONTINUED | OUTPATIENT
Start: 2021-10-26 | End: 2021-10-26

## 2021-10-26 RX ORDER — HYDROMORPHONE HYDROCHLORIDE 2 MG/ML
30 INJECTION INTRAMUSCULAR; INTRAVENOUS; SUBCUTANEOUS
Refills: 0 | Status: DISCONTINUED | OUTPATIENT
Start: 2021-10-26 | End: 2021-10-27

## 2021-10-26 RX ORDER — HYDROMORPHONE HYDROCHLORIDE 2 MG/ML
0.5 INJECTION INTRAMUSCULAR; INTRAVENOUS; SUBCUTANEOUS ONCE
Refills: 0 | Status: DISCONTINUED | OUTPATIENT
Start: 2021-10-26 | End: 2021-10-26

## 2021-10-26 RX ORDER — FENTANYL CITRATE 50 UG/ML
50 INJECTION INTRAVENOUS
Refills: 0 | Status: DISCONTINUED | OUTPATIENT
Start: 2021-10-26 | End: 2021-10-26

## 2021-10-26 RX ORDER — NALOXONE HYDROCHLORIDE 4 MG/.1ML
0.1 SPRAY NASAL
Refills: 0 | Status: DISCONTINUED | OUTPATIENT
Start: 2021-10-26 | End: 2021-10-27

## 2021-10-26 RX ORDER — LIDOCAINE HCL 20 MG/ML
0.2 VIAL (ML) INJECTION ONCE
Refills: 0 | Status: DISCONTINUED | OUTPATIENT
Start: 2021-10-26 | End: 2021-10-26

## 2021-10-26 RX ORDER — CHLORHEXIDINE GLUCONATE 213 G/1000ML
1 SOLUTION TOPICAL ONCE
Refills: 0 | Status: DISCONTINUED | OUTPATIENT
Start: 2021-10-26 | End: 2021-10-26

## 2021-10-26 RX ORDER — SODIUM CHLORIDE 9 MG/ML
3 INJECTION INTRAMUSCULAR; INTRAVENOUS; SUBCUTANEOUS EVERY 8 HOURS
Refills: 0 | Status: DISCONTINUED | OUTPATIENT
Start: 2021-10-26 | End: 2021-10-26

## 2021-10-26 RX ORDER — CHLORHEXIDINE GLUCONATE 213 G/1000ML
1 SOLUTION TOPICAL DAILY
Refills: 0 | Status: DISCONTINUED | OUTPATIENT
Start: 2021-10-26 | End: 2021-10-27

## 2021-10-26 RX ADMIN — Medication 25 MILLIGRAM(S): at 20:59

## 2021-10-26 RX ADMIN — Medication 400 MILLIGRAM(S): at 22:09

## 2021-10-26 RX ADMIN — PANTOPRAZOLE SODIUM 40 MILLIGRAM(S): 20 TABLET, DELAYED RELEASE ORAL at 18:01

## 2021-10-26 RX ADMIN — SODIUM CHLORIDE 100 MILLILITER(S): 9 INJECTION, SOLUTION INTRAVENOUS at 18:35

## 2021-10-26 RX ADMIN — Medication 1000 MILLIGRAM(S): at 18:32

## 2021-10-26 RX ADMIN — Medication 1000 MILLIGRAM(S): at 22:17

## 2021-10-26 RX ADMIN — CHLORHEXIDINE GLUCONATE 1 APPLICATION(S): 213 SOLUTION TOPICAL at 22:11

## 2021-10-26 RX ADMIN — HYDROMORPHONE HYDROCHLORIDE 0.5 MILLIGRAM(S): 2 INJECTION INTRAMUSCULAR; INTRAVENOUS; SUBCUTANEOUS at 17:58

## 2021-10-26 RX ADMIN — HYDROMORPHONE HYDROCHLORIDE 0.5 MILLIGRAM(S): 2 INJECTION INTRAMUSCULAR; INTRAVENOUS; SUBCUTANEOUS at 20:13

## 2021-10-26 RX ADMIN — Medication 400 MILLIGRAM(S): at 18:31

## 2021-10-26 RX ADMIN — HYDROMORPHONE HYDROCHLORIDE 30 MILLILITER(S): 2 INJECTION INTRAMUSCULAR; INTRAVENOUS; SUBCUTANEOUS at 20:00

## 2021-10-26 RX ADMIN — HYDROMORPHONE HYDROCHLORIDE 0.5 MILLIGRAM(S): 2 INJECTION INTRAMUSCULAR; INTRAVENOUS; SUBCUTANEOUS at 18:13

## 2021-10-26 NOTE — PROCEDURE NOTE - ADDITIONAL PROCEDURE DETAILS
Indications: OR check   Findings: Since last follow up no arrhythmia episodes for review.  Patient going for Whipple Surgery, magnet would interfere with sterile field.   Changes made: Disabled tachytherapies, Zoll pads applied to patient and attached to external defibrillator, turned on.     Communicated device's tachytherapies must be enabled prior to leaving OR. Instructed team to call back before leaving OR.     [id number]

## 2021-10-26 NOTE — OPERATIVE REPORT - NSICDXBRIEFPROCEDURE_GEN_ALL_CORE_FT
PROCEDURES:  Pancreatectomy, Whipple, with pancreaticojejunostomy 26-Oct-2021 18:17:41  Fady Mercer  Omental flap 26-Oct-2021 18:18:06  Fady Mercer  Repair portal vein, open approach 26-Oct-2021 18:18:30  Fady Mercer  Negative pressure wound therapy, 50 sq cm or less 26-Oct-2021 18:18:51  Fady Mercer

## 2021-10-26 NOTE — OPERATIVE REPORT - OPERATIVE RPOSRT DETAILS
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  They had were placed in the supine position and underwent general anesthesia. Jimenez catheter and arterial line were place.  They were prepped and draped in the normal sterile manner with a chlorhexidine prep and a loban dressing over the skin.  We also prepped the right neck in case an IJ interposition was needed for vascular reconstruction.  A time-out was performed prior to any incision and the patient was given preoperative prophylactic antibiotics as well as subcutaneous heparin.      We made a midline incision from just below the xiphoid process to below the umbilicus.  We entered the abdomen under direct visualization, excised a large portion of the pre-peritoneal fat and then placed a Bookwalter retractor in position.  We thoroughly explored the abdomen and found no evidence of metastatic disease.  We performed a formal Cattell-Braasch maneuver and kocherized the duodenum to the left of the aorta.  We then enter the lesser sac along the greater curvature of the stomach and dissected down to the SMV.  We took down the gastrohepatic ligament, divided the right gastric artery between 2-0 silk ties, and then identified the GDA.   We test clamped the GDA to confirm there was no retrograde flow.  We then divided the GDA with a vascular load of the echelon powered stapler.  We then encircled the bile duct with a vessel loop.      At this point, I felt the lesion was indeed resectable, so we divide the antrum of the stomach with a black load of the ANDRES stapler and reflected the proximal stomach to the left upper quadrant.  The tumor was stuck to the anterior wall of the PV-SMV confluence making tunneling impossible.  I therefore divided the neck of the pancreas from top down without tunneling.    We had already divide the bile duct with electrocautery to gain access to the portal vein.  We removed the gallbladder in the standard top down manner.  We divided the cystic artery and duct between 2-0 silk ties.    We then mobilized the pancreas off of the portal vein and divided the proximal jejunum with a purple load of the ANDRES stapler.  We took the mesentery with the LigaSure device and then reflected the proximal jejunum through the ligament of Treitz to the right upper quadrant.  We took the remainder of the uncinate process with multiple Reinhoff clamps, 2-0 silk ties, as well as a LigaSure device and firings of the vascular stapler as appropriate.  We took a small portion of the SMV en bloc with the gastroepiploic vein with a stapler divice and took great care not to narrow the SMV.  There was good hemostasis and we remove the specimen from the field.  We placed several clips for possible postop radiation if needed.  We then closed the mesenteric defect to the ligament of Treitz with a running 3-0 Prolene suture (interrupted 3-0 silk sutures).  We made a small hold in the bare area of the transverse mesocolon to the right of the middle colic vessels and bring the proximal jejunum to the right upper quadrant.      We oversewed the end of the jejunum with 3-0 silk pop sutures.     We performed an end-to-side pancreaticojejunostomy using 3-0 silk pop sutures as the anterior and posterior layers and using 5-0 PDS as the duct-to-mucosal layer.  We left a 5-Chinese pediatric feeding tube as a stent.  We then performed an end-to-side hepaticojejunostomy using 4-0 PDS suture in a standard interrupted manner.  We closed the mesenteric defect with 3-0 silk pop sutures.  We then performed an antecolic isoperistaltic gastrojejunostomy using running 3-0 PDS suture in a two-layer anastomosis.  We mobilized a large omental flap off of the transverse mesocolon and placed this on the PJ anastomosis and the GDA stump.  We confirmed the NG tube is in good position.  We then place two 19 Eliseo drains in the standard position.  The more inferior drain was placed below the anastomosis and the more superior drain was placed above.      We then closed the abdominal fascia with a running #1 looped PDS suture.  We closed the dermis with 3-0 Vicryl suture and we affixed a negative-pressure wound therapy device to the incision.     The patient tolerated the procedure well.  The patient was extubated and then transferred to the ICU for overnight monitoring.

## 2021-10-26 NOTE — OPERATIVE REPORT - COMMENTS
Patient was still on levofed at end of case but was weaning towards off.  Given serious cardiac history, we escorted him to the ICU.

## 2021-10-26 NOTE — PRE-ANESTHESIA EVALUATION ADULT - NSANTHADDINFOFT_GEN_ALL_CORE
Discussed with patient plan of general anesthesia with endotracheal tube, a-line, possible central line. For pain control, we discussed that we would attempt either thoracic epidural or spinal duramorph but that may not be possible given patient's extensive back surgery. If unsuccessful, we would plan for a truncal block at the end of the procedure. Patient is agreeable to this plan.

## 2021-10-26 NOTE — CHART NOTE - NSCHARTNOTEFT_GEN_A_CORE
POST-OPERATIVE NOTE    Subjective:  Patient is s/p whipple procedure. Recovering appropriately in SICU for hemodynamic monitoring. Patient stated to be in pain, but stated pain felt better after patient used his PCA.     Vital Signs Last 24 Hrs  T(C): 35.9 (26 Oct 2021 19:00), Max: 36.5 (26 Oct 2021 17:30)  T(F): 96.6 (26 Oct 2021 19:00), Max: 97.7 (26 Oct 2021 17:30)  HR: 98 (26 Oct 2021 22:00) (65 - 112)  BP: 86/63 (26 Oct 2021 20:00) (86/63 - 136/71)  BP(mean): 71 (26 Oct 2021 20:00) (70 - 98)  RR: 15 (26 Oct 2021 22:00) (12 - 19)  SpO2: 100% (26 Oct 2021 22:00) (92% - 100%)  I&O's Detail    26 Oct 2021 07:01  -  26 Oct 2021 23:07  --------------------------------------------------------  IN:    IV PiggyBack: 100 mL    Lactated Ringers: 400 mL  Total IN: 500 mL    OUT:    Bulb (mL): 130 mL    Bulb (mL): 70 mL    Indwelling Catheter - Urethral (mL): 245 mL    Nasogastric/Oral tube (mL): 50 mL  Total OUT: 495 mL    Total NET: 5 mL          PAST MEDICAL & SURGICAL HISTORY:  HTN (hypertension)    HLD (hyperlipidemia)    GERD (gastroesophageal reflux disease)    Cardiomyopathy    MSSA bacteremia  9/2021    Acute osteomyelitis    Pulmonary embolism    Pancreas cancer  chemo    [place] (hard of hearing)  B/L hearing aids    History of total hip replacement  left X 1, 2 revisions    History of laminectomy  X3    ICD (implantable cardiac defibrillator) in place  implanted 2005, replaced 9/2021    Benign testicular tumor  radiation    Status post amputation of toe of right foot  8/2021    Status post fracture of femur  2017 left with hardware    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   Extremities: WWP      LABS:                        10.1   9.45  )-----------( 157      ( 26 Oct 2021 16:35 )             32.8     10-26    138  |  104  |  11  ----------------------------<  129<H>  4.1   |  19<L>  |  0.69    Ca    8.7      26 Oct 2021 16:35  Phos  4.7     10-26  Mg     1.8     10-26    TPro  6.6  /  Alb  3.6  /  TBili  0.6  /  DBili  0.3<H>  /  AST  36  /  ALT  13  /  AlkPhos  92  10-26    PT/INR - ( 26 Oct 2021 16:35 )   PT: 13.5 sec;   INR: 1.13 ratio         PTT - ( 26 Oct 2021 16:35 )  PTT:27.5 sec  CAPILLARY BLOOD GLUCOSE      POCT Blood Glucose.: 122 mg/dL (26 Oct 2021 18:05)      Radiology and Additional Studies:    Assessment:  The patient is a 80y Male who is now several hours post-op from a whipple procedure taken to SICU for hemodynamic monitoring     Plan:  - Pain control as needed  - DVT ppx  - OOB and ambulating as tolerated  - F/u AM labs  - Monitor I&O's and drain output   - Monitor hemodynamics   - Appreciate excellent SICU care

## 2021-10-26 NOTE — HISTORY OF PRESENT ILLNESS
[FreeTextEntry1] : patient presents after being d/c from [facility] for continued treatment of a right partial 1st ray site ulceration. Patient had procedure performed at [facility] by another physician. Patient relates no fever, chills, ns, nausea or calf tenderness

## 2021-10-26 NOTE — CONSULT NOTE ADULT - ASSESSMENT
80M w/ complex cardiac comorbidities and hx of pancreatic cancer now s/p uncomplicated whipple procedure. Patient transferred to SICU for close immediate monse-operative monitoring.    PLAN:  NEUROLOGY:    RESPIRATORY:    CARDIOVASCULAR:    GASTROINTESTINAL/NUTRITION:    /RENAL:    HEMATOLOGIC:  -    INFECTIOUS DISEASE:  -    ENDOCRINE:  -     LINES:  - PIVs  - RUE PICC line  - L radial a-line  - NGT  - Prevena incisional VAC    DISPO:  - SICU  - Full code   80M w/ complex cardiac comorbidities and hx of pancreatic cancer now s/p uncomplicated whipple procedure. Patient transferred to SICU for close immediate monse-operative monitoring.    PLAN:  NEUROLOGY:  - Pain Control: IV Tylenol 1g q6hr x2 doses and Dilaudid PCA    RESPIRATORY: B/L PE on Xarelto  - Monitor O2 Sat on NC    CARDIOVASCULAR: HTN, HLD, CHF (EF 35%), CAD s/p Stents x2 (15y ago), TAVR  - Monitor hemodynamics    GASTROINTESTINAL/NUTRITION: GERD, Pancreatic CA  - NPO/NGT @ [facility]  - IV Protonix 40mg Daily per Whipple pathway    /RENAL:  - Jimenez in place  - Monitor I&Os  - Monitor electrolytes, replete as needed    HEMATOLOGIC: b/l PE on xarelto  - Hold SQH and Xarelto monse-op per primary team    INFECTIOUS DISEASE: Hx of R hallux OM, s/p IV abx through RUE Picc  - Trend fever curve and WBC on CBC    ENDOCRINE:  - mISS q6hr per whipple pathway    LINES:  - PIVs  - RUE PICC line  - L radial a-line  - NGT  - Prevena incisional VAC    DISPO:  - SICU  - Full code

## 2021-10-26 NOTE — BRIEF OPERATIVE NOTE - OPERATION/FINDINGS
Whipple procedure    very stuck to lateral wall of SMV, no frozens. upon starting the case had bradycardia and hypotension which improved and required low dose pressor for most of the case which was weaned off at the end.

## 2021-10-26 NOTE — PLAN
[FreeTextEntry1] : spent 30 min in initial consultation with patient\par aseptic sharp debridemont with #15 blade skin full thickness down to sub q with santyl and dsd applied\par Patient to continue with santyl and will order topical e02 system\par patient to f/u 2 weeks

## 2021-10-26 NOTE — CONSULT NOTE ADULT - SUBJECTIVE AND OBJECTIVE BOX
SICU Consultation Note  =====================================================  HPI: 80M w/ PMHx pancreatic cancer (dx 3/2021, s/p neoadjuvant therapy), HTN, HLD, CHF (EF: 35%), CAD s/p stents x2 (~15 years ago), AICD (implanted 2005, extracted and re-implantation 9/2021), TAVR (4/2021), bilateral PE (7/2021) on Xarelto, spinal stenosis, macular degeneration, GERD, BPH, Left THR (x2 revisions), left femur fracture (hardware), 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 Patient presents for a scheduled whipple procedure by Dr. Mercer. SICU consulted for close monse-op monitoring.     Surgery Information  OR time:      EBL:          IV Fluids:       Blood Products:   UOP:          PAST MEDICAL & SURGICAL HISTORY:  HTN (hypertension)  HLD (hyperlipidemia)  GERD (gastroesophageal reflux disease)  Cardiomyopathy  MSSA bacteremia  9/2021  Acute osteomyelitis  Pulmonary embolism  Pancreas cancer  chemo  [place] (hard of hearing)  B/L hearing aids  History of total hip replacement  left X 1, 2 revisions  History of laminectomy  X3  ICD (implantable cardiac defibrillator) in place  implanted 2005, replaced 9/2021  Benign testicular tumor  radiation  Status post amputation of toe of right foot  8/2021  Status post fracture of femur  2017 left with hardware  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  H/O Whipple procedure  2/2021    Home Meds:  ALPRAZolam 0.25 mg oral tablet: 1 tab(s) orally 3 times a day, As Needed (26 Oct 2021 07:19)  cholecalciferol 400 intl units (10 mcg) oral tablet: 1 tab(s) orally once a day (26 Oct 2021 07:19)  Coenzyme Q10 200 mg oral capsule: 1 tab(s) orally once a day (26 Oct 2021 07:19)  dorzolamide-timolol 2.23%-0.68% ophthalmic solution: 1 drop(s) to each affected eye 2 times a day (26 Oct 2021 07:19)  DULoxetine 60 mg oral delayed release capsule: 1 cap(s) orally once a day (26 Oct 2021 07:19)  gabapentin 300 mg oral tablet: 1 tab(s) orally 2 times a day (26 Oct 2021 07:19)  Keflex 500 mg oral capsule: 1 cap(s) orally every 12 hours (26 Oct 2021 07:19)  ketorolac 0.5% ophthalmic solution: 1 drop(s) to each affected eye once a day (26 Oct 2021 07:19)  latanoprost 0.005% ophthalmic solution: 1 drop(s) to each affected eye once a day (in the evening) (26 Oct 2021 07:19)  melatonin 3 mg oral tablet: 2 tab(s) orally once a day (at bedtime) (26 Oct 2021 07:19)  Multiple Vitamins oral tablet: 1 tab(s) orally once a day (26 Oct 2021 07:19)  oxyCODONE 10 mg oral tablet: 1 tab(s) orally every 6 hours, As Needed (26 Oct 2021 07:19)  pancrelipase 36,000 units-114,000 units-180,000 units oral delayed release capsule: 2 cap(s) orally 3 times a day (26 Oct 2021 07:19)  sacubitril-valsartan 24 mg-26 mg oral tablet: 1 tab(s) orally 2 times a day (26 Oct 2021 07:19)  Senna 8.6 mg oral tablet: 2 tab(s) orally once a day (at bedtime) (26 Oct 2021 07:19)  simethicone 80 mg oral tablet: 2 tab(s) orally once a day (at bedtime), As Needed (26 Oct 2021 07:19)  sotalol 80 mg oral tablet: 0.5 tab(s) orally 2 times a day (26 Oct 2021 07:19)  tamsulosin 0.4 mg oral capsule: 1 cap(s) orally once a day (26 Oct 2021 07:19)  Vitamin B6 100 mg oral tablet: 1 tab(s) orally once a day (26 Oct 2021 07:19)    Allergies:   No Known Allergies  Intolerances    Soc:   Advanced Directives: Presumed Full Code     ROS:    REVIEW OF SYSTEMS  [X] A ten-point review of systems was otherwise negative except as noted.  [X] Due to altered mental status/intubation, subjective information were not able to be obtained from the patient. History was obtained, to the extent possible, from review of the chart and collateral sources of information.    CURRENT MEDICATIONS:   --------------------------------------------------------------------------------------  Neurologic Medications  fentaNYL    Injectable 50 MICROGram(s) IV Push every 5 minutes PRN Severe Pain (7 - 10)  HYDROmorphone PCA (1 mG/mL) 30 milliLiter(s) PCA Continuous PCA Continuous  HYDROmorphone PCA (1 mG/mL) Rescue Clinician Bolus 0.5 milliGRAM(s) IV Push every 15 minutes PRN for Pain Scale GREATER THAN 6  methadone Injectable 50 milliGRAM(s) IV Push once  ondansetron Injectable 4 milliGRAM(s) IV Push every 6 hours PRN Nausea  ondansetron Injectable 4 milliGRAM(s) IV Push once PRN Nausea and/or Vomiting    Respiratory Medications  diphenhydrAMINE Injectable 25 milliGRAM(s) IV Push every 4 hours PRN Pruritus    Cardiovascular Medications    Gastrointestinal Medications    Genitourinary Medications    Hematologic/Oncologic Medications  influenza   Vaccine 0.5 milliLiter(s) IntraMuscular once    Antimicrobial/Immunologic Medications  cefoTEtan  IVPB 2 Gram(s) IV Intermittent once    Endocrine/Metabolic Medications    Topical/Other Medications  naloxone Injectable 0.1 milliGRAM(s) IV Push every 3 minutes PRN For ANY of the following changes in patient status:  A. RR LESS THAN 10 breaths per minute, B. Oxygen saturation LESS THAN 90%, C. Sedation score of 6  --------------------------------------------------------------------------------------  VITAL SIGNS, INS/OUTS (last 24 hours):  --------------------------------------------------------------------------------------  ICU Vital Signs Last 24 Hrs  T(C): 36.4 (26 Oct 2021 08:10), Max: 36.4 (26 Oct 2021 07:35)  T(F): 97.5 (26 Oct 2021 07:35), Max: 97.5 (26 Oct 2021 07:35)  HR: 65 (26 Oct 2021 08:10) (65 - 65)  BP: 120/72 (26 Oct 2021 08:10) (120/72 - 120/72)  BP(mean): --  ABP: --  ABP(mean): --  RR: 18 (26 Oct 2021 08:10) (18 - 18)  SpO2: 99% (26 Oct 2021 08:10) (99% - 99%)    I&O's Summary    -------------------------------------------------------------------------------------  EXAM:  General/Neuro  Exam: Normal, NAD, alert, oriented x 3, no focal deficits. PERRLA     Respiratory  Exam: Lungs clear to auscultation, Normal expansion/effort.     Cardiovascular  Exam: S1, S2.  Regular rate and rhythm.  Peripheral edema    Cardiac Rhythm: Normal Sinus Rhythm    GI  Exam: Abdomen soft, Non-tender, Non-distended. Prevena VAC in place on midline laparotomy incision. 2 Eliseo drains in left lateral upper quadrant.    Tubes/Lines/Drains  [x] Peripheral IV  [] Central Venous Line     	[] R	[] L	[] IJ	[] Fem	[] SC        Type:	    Date Placed:   [X] Arterial Line		[] R	[X] L	[] Fem	[X] Rad	[] Ax	Date Placed:   [X] PICC:         	[] Midline		[] Mediport           [X] Urinary Catheter		Date Placed: 10/26/2021    Extremities  Exam: Extremities warm, pink, well-perfused.      Derm:  Exam: Good skin turgor, no skin breakdown.      :   Exam: Jimenez catheter in place.     LABS  --------------------------------------------------------------------------------------  ABG - ( 26 Oct 2021 13:29 )  pH, Arterial: 7.38  pH, Blood: x     /  pCO2: 36    /  pO2: 193   / HCO3: 21    / Base Excess: -3.4  /  SaO2: 98.4    -------------------------------------------------------------------------------------- SICU Consultation Note  =====================================================  HPI: 80M w/ PMHx pancreatic cancer (dx 3/2021, s/p neoadjuvant therapy), HTN, HLD, CHF (EF: 35%), CAD s/p stents x2 (~15 years ago), AICD (implanted 2005, extracted and re-implantation 9/2021), TAVR (4/2021), bilateral PE (7/2021) on Xarelto, spinal stenosis, macular degeneration, GERD, BPH, Left THR (x2 revisions), left femur fracture (hardware), 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 Patient presents for a scheduled whipple procedure by Dr. Mercer. SICU consulted for close monse-op monitoring.     Surgery Information  OR time: 6hr 57min     EBL:  200         IV Fluids: 1L NS  and   3.4L Normosol     Blood Products: None    UOP: 435      PAST MEDICAL & SURGICAL HISTORY:  HTN (hypertension)  HLD (hyperlipidemia)  GERD (gastroesophageal reflux disease)  Cardiomyopathy  MSSA bacteremia  9/2021  Acute osteomyelitis  Pulmonary embolism  Pancreas cancer  chemo  [place] (hard of hearing)  B/L hearing aids  History of total hip replacement  left X 1, 2 revisions  History of laminectomy  X3  ICD (implantable cardiac defibrillator) in place  implanted 2005, replaced 9/2021  Benign testicular tumor  radiation  Status post amputation of toe of right foot  8/2021  Status post fracture of femur  2017 left with hardware  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  H/O Whipple procedure  2/2021    Home Meds:  ALPRAZolam 0.25 mg oral tablet: 1 tab(s) orally 3 times a day, As Needed (26 Oct 2021 07:19)  cholecalciferol 400 intl units (10 mcg) oral tablet: 1 tab(s) orally once a day (26 Oct 2021 07:19)  Coenzyme Q10 200 mg oral capsule: 1 tab(s) orally once a day (26 Oct 2021 07:19)  dorzolamide-timolol 2.23%-0.68% ophthalmic solution: 1 drop(s) to each affected eye 2 times a day (26 Oct 2021 07:19)  DULoxetine 60 mg oral delayed release capsule: 1 cap(s) orally once a day (26 Oct 2021 07:19)  gabapentin 300 mg oral tablet: 1 tab(s) orally 2 times a day (26 Oct 2021 07:19)  Keflex 500 mg oral capsule: 1 cap(s) orally every 12 hours (26 Oct 2021 07:19)  ketorolac 0.5% ophthalmic solution: 1 drop(s) to each affected eye once a day (26 Oct 2021 07:19)  latanoprost 0.005% ophthalmic solution: 1 drop(s) to each affected eye once a day (in the evening) (26 Oct 2021 07:19)  melatonin 3 mg oral tablet: 2 tab(s) orally once a day (at bedtime) (26 Oct 2021 07:19)  Multiple Vitamins oral tablet: 1 tab(s) orally once a day (26 Oct 2021 07:19)  oxyCODONE 10 mg oral tablet: 1 tab(s) orally every 6 hours, As Needed (26 Oct 2021 07:19)  pancrelipase 36,000 units-114,000 units-180,000 units oral delayed release capsule: 2 cap(s) orally 3 times a day (26 Oct 2021 07:19)  sacubitril-valsartan 24 mg-26 mg oral tablet: 1 tab(s) orally 2 times a day (26 Oct 2021 07:19)  Senna 8.6 mg oral tablet: 2 tab(s) orally once a day (at bedtime) (26 Oct 2021 07:19)  simethicone 80 mg oral tablet: 2 tab(s) orally once a day (at bedtime), As Needed (26 Oct 2021 07:19)  sotalol 80 mg oral tablet: 0.5 tab(s) orally 2 times a day (26 Oct 2021 07:19)  tamsulosin 0.4 mg oral capsule: 1 cap(s) orally once a day (26 Oct 2021 07:19)  Vitamin B6 100 mg oral tablet: 1 tab(s) orally once a day (26 Oct 2021 07:19)    Allergies:   No Known Allergies  Intolerances    Soc:   Advanced Directives: Presumed Full Code     ROS:    REVIEW OF SYSTEMS  [X] A ten-point review of systems was otherwise negative except as noted.  [X] Due to altered mental status/intubation, subjective information were not able to be obtained from the patient. History was obtained, to the extent possible, from review of the chart and collateral sources of information.    CURRENT MEDICATIONS:   --------------------------------------------------------------------------------------  Neurologic Medications  fentaNYL    Injectable 50 MICROGram(s) IV Push every 5 minutes PRN Severe Pain (7 - 10)  HYDROmorphone PCA (1 mG/mL) 30 milliLiter(s) PCA Continuous PCA Continuous  HYDROmorphone PCA (1 mG/mL) Rescue Clinician Bolus 0.5 milliGRAM(s) IV Push every 15 minutes PRN for Pain Scale GREATER THAN 6  methadone Injectable 50 milliGRAM(s) IV Push once  ondansetron Injectable 4 milliGRAM(s) IV Push every 6 hours PRN Nausea  ondansetron Injectable 4 milliGRAM(s) IV Push once PRN Nausea and/or Vomiting    Respiratory Medications  diphenhydrAMINE Injectable 25 milliGRAM(s) IV Push every 4 hours PRN Pruritus    Cardiovascular Medications    Gastrointestinal Medications    Genitourinary Medications    Hematologic/Oncologic Medications  influenza   Vaccine 0.5 milliLiter(s) IntraMuscular once    Antimicrobial/Immunologic Medications  cefoTEtan  IVPB 2 Gram(s) IV Intermittent once    Endocrine/Metabolic Medications    Topical/Other Medications  naloxone Injectable 0.1 milliGRAM(s) IV Push every 3 minutes PRN For ANY of the following changes in patient status:  A. RR LESS THAN 10 breaths per minute, B. Oxygen saturation LESS THAN 90%, C. Sedation score of 6  --------------------------------------------------------------------------------------  VITAL SIGNS, INS/OUTS (last 24 hours):  --------------------------------------------------------------------------------------  ICU Vital Signs Last 24 Hrs  T(C): 36.4 (26 Oct 2021 08:10), Max: 36.4 (26 Oct 2021 07:35)  T(F): 97.5 (26 Oct 2021 07:35), Max: 97.5 (26 Oct 2021 07:35)  HR: 65 (26 Oct 2021 08:10) (65 - 65)  BP: 120/72 (26 Oct 2021 08:10) (120/72 - 120/72)  BP(mean): --  ABP: --  ABP(mean): --  RR: 18 (26 Oct 2021 08:10) (18 - 18)  SpO2: 99% (26 Oct 2021 08:10) (99% - 99%)    I&O's Summary    -------------------------------------------------------------------------------------  EXAM:  General/Neuro  Exam: Normal, NAD, alert, oriented x 3, no focal deficits. PERRLA     Respiratory  Exam: Lungs clear to auscultation, Normal expansion/effort.     Cardiovascular  Exam: S1, S2.  Regular rate and rhythm.  Peripheral edema    Cardiac Rhythm: Normal Sinus Rhythm    GI  Exam: Abdomen soft, Non-tender, Non-distended. Prevena VAC in place on midline laparotomy incision. 2 Eliseo drains in left lateral upper quadrant.    Tubes/Lines/Drains  [x] Peripheral IV  [] Central Venous Line     	[] R	[] L	[] IJ	[] Fem	[] SC        Type:	    Date Placed:   [X] Arterial Line		[] R	[X] L	[] Fem	[X] Rad	[] Ax	Date Placed:   [X] PICC:         	[] Midline		[] Mediport           [X] Urinary Catheter		Date Placed: 10/26/2021    Extremities  Exam: Extremities warm, pink, well-perfused.      Derm:  Exam: Good skin turgor, no skin breakdown.      :   Exam: Jimenez catheter in place.     LABS  --------------------------------------------------------------------------------------  ABG - ( 26 Oct 2021 13:29 )  pH, Arterial: 7.38  pH, Blood: x     /  pCO2: 36    /  pO2: 193   / HCO3: 21    / Base Excess: -3.4  /  SaO2: 98.4    --------------------------------------------------------------------------------------

## 2021-10-27 LAB
ALBUMIN SERPL ELPH-MCNC: 3 G/DL — LOW (ref 3.3–5)
ALBUMIN SERPL ELPH-MCNC: 3.2 G/DL — LOW (ref 3.3–5)
ALBUMIN SERPL ELPH-MCNC: 3.3 G/DL — SIGNIFICANT CHANGE UP (ref 3.3–5)
ALBUMIN SERPL ELPH-MCNC: 3.3 G/DL — SIGNIFICANT CHANGE UP (ref 3.3–5)
ALBUMIN SERPL ELPH-MCNC: 3.5 G/DL — SIGNIFICANT CHANGE UP (ref 3.3–5)
ALBUMIN SERPL ELPH-MCNC: 3.7 G/DL — SIGNIFICANT CHANGE UP (ref 3.3–5)
ALP SERPL-CCNC: 61 U/L — SIGNIFICANT CHANGE UP (ref 40–120)
ALP SERPL-CCNC: 61 U/L — SIGNIFICANT CHANGE UP (ref 40–120)
ALP SERPL-CCNC: 73 U/L — SIGNIFICANT CHANGE UP (ref 40–120)
ALP SERPL-CCNC: 73 U/L — SIGNIFICANT CHANGE UP (ref 40–120)
ALP SERPL-CCNC: 79 U/L — SIGNIFICANT CHANGE UP (ref 40–120)
ALP SERPL-CCNC: 87 U/L — SIGNIFICANT CHANGE UP (ref 40–120)
ALT FLD-CCNC: 11 U/L — SIGNIFICANT CHANGE UP (ref 10–45)
ALT FLD-CCNC: 12 U/L — SIGNIFICANT CHANGE UP (ref 10–45)
ALT FLD-CCNC: 12 U/L — SIGNIFICANT CHANGE UP (ref 10–45)
ALT FLD-CCNC: 13 U/L — SIGNIFICANT CHANGE UP (ref 10–45)
AMYLASE FLD-CCNC: 26 U/L — SIGNIFICANT CHANGE UP
AMYLASE FLD-CCNC: 30 U/L — SIGNIFICANT CHANGE UP
AMYLASE P1 CFR SERPL: 28 U/L — SIGNIFICANT CHANGE UP (ref 25–125)
AMYLASE P1 CFR SERPL: 30 U/L — SIGNIFICANT CHANGE UP (ref 25–125)
AMYLASE P1 CFR SERPL: 34 U/L — SIGNIFICANT CHANGE UP (ref 25–125)
ANION GAP SERPL CALC-SCNC: 11 MMOL/L — SIGNIFICANT CHANGE UP (ref 5–17)
ANION GAP SERPL CALC-SCNC: 14 MMOL/L — SIGNIFICANT CHANGE UP (ref 5–17)
ANION GAP SERPL CALC-SCNC: 14 MMOL/L — SIGNIFICANT CHANGE UP (ref 5–17)
ANION GAP SERPL CALC-SCNC: 16 MMOL/L — SIGNIFICANT CHANGE UP (ref 5–17)
ANION GAP SERPL CALC-SCNC: 16 MMOL/L — SIGNIFICANT CHANGE UP (ref 5–17)
ANION GAP SERPL CALC-SCNC: 22 MMOL/L — HIGH (ref 5–17)
APTT BLD: 28.4 SEC — SIGNIFICANT CHANGE UP (ref 27.5–35.5)
APTT BLD: 29.2 SEC — SIGNIFICANT CHANGE UP (ref 27.5–35.5)
AST SERPL-CCNC: 32 U/L — SIGNIFICANT CHANGE UP (ref 10–40)
AST SERPL-CCNC: 34 U/L — SIGNIFICANT CHANGE UP (ref 10–40)
AST SERPL-CCNC: 35 U/L — SIGNIFICANT CHANGE UP (ref 10–40)
AST SERPL-CCNC: 36 U/L — SIGNIFICANT CHANGE UP (ref 10–40)
AST SERPL-CCNC: 38 U/L — SIGNIFICANT CHANGE UP (ref 10–40)
AST SERPL-CCNC: 40 U/L — SIGNIFICANT CHANGE UP (ref 10–40)
BILIRUB SERPL-MCNC: 0.5 MG/DL — SIGNIFICANT CHANGE UP (ref 0.2–1.2)
BILIRUB SERPL-MCNC: 0.6 MG/DL — SIGNIFICANT CHANGE UP (ref 0.2–1.2)
BILIRUB SERPL-MCNC: 0.7 MG/DL — SIGNIFICANT CHANGE UP (ref 0.2–1.2)
BLD GP AB SCN SERPL QL: NEGATIVE — SIGNIFICANT CHANGE UP
BUN SERPL-MCNC: 13 MG/DL — SIGNIFICANT CHANGE UP (ref 7–23)
BUN SERPL-MCNC: 15 MG/DL — SIGNIFICANT CHANGE UP (ref 7–23)
BUN SERPL-MCNC: 17 MG/DL — SIGNIFICANT CHANGE UP (ref 7–23)
CALCIUM SERPL-MCNC: 8 MG/DL — LOW (ref 8.4–10.5)
CALCIUM SERPL-MCNC: 8.1 MG/DL — LOW (ref 8.4–10.5)
CALCIUM SERPL-MCNC: 8.2 MG/DL — LOW (ref 8.4–10.5)
CALCIUM SERPL-MCNC: 8.3 MG/DL — LOW (ref 8.4–10.5)
CALCIUM SERPL-MCNC: 8.4 MG/DL — SIGNIFICANT CHANGE UP (ref 8.4–10.5)
CALCIUM SERPL-MCNC: 8.6 MG/DL — SIGNIFICANT CHANGE UP (ref 8.4–10.5)
CHLORIDE SERPL-SCNC: 101 MMOL/L — SIGNIFICANT CHANGE UP (ref 96–108)
CHLORIDE SERPL-SCNC: 103 MMOL/L — SIGNIFICANT CHANGE UP (ref 96–108)
CHLORIDE SERPL-SCNC: 103 MMOL/L — SIGNIFICANT CHANGE UP (ref 96–108)
CHLORIDE SERPL-SCNC: 104 MMOL/L — SIGNIFICANT CHANGE UP (ref 96–108)
CHLORIDE SERPL-SCNC: 104 MMOL/L — SIGNIFICANT CHANGE UP (ref 96–108)
CHLORIDE SERPL-SCNC: 106 MMOL/L — SIGNIFICANT CHANGE UP (ref 96–108)
CO2 SERPL-SCNC: 13 MMOL/L — LOW (ref 22–31)
CO2 SERPL-SCNC: 17 MMOL/L — LOW (ref 22–31)
CO2 SERPL-SCNC: 17 MMOL/L — LOW (ref 22–31)
CO2 SERPL-SCNC: 19 MMOL/L — LOW (ref 22–31)
COVID-19 SPIKE DOMAIN AB INTERP: POSITIVE
COVID-19 SPIKE DOMAIN ANTIBODY RESULT: 9.15 U/ML — HIGH
CREAT SERPL-MCNC: 0.82 MG/DL — SIGNIFICANT CHANGE UP (ref 0.5–1.3)
CREAT SERPL-MCNC: 0.84 MG/DL — SIGNIFICANT CHANGE UP (ref 0.5–1.3)
CREAT SERPL-MCNC: 0.94 MG/DL — SIGNIFICANT CHANGE UP (ref 0.5–1.3)
CREAT SERPL-MCNC: 0.97 MG/DL — SIGNIFICANT CHANGE UP (ref 0.5–1.3)
CREAT SERPL-MCNC: 1.07 MG/DL — SIGNIFICANT CHANGE UP (ref 0.5–1.3)
CREAT SERPL-MCNC: 1.09 MG/DL — SIGNIFICANT CHANGE UP (ref 0.5–1.3)
CULTURE RESULTS: SIGNIFICANT CHANGE UP
GAS PNL BLDA: SIGNIFICANT CHANGE UP
GAS PNL BLDV: SIGNIFICANT CHANGE UP
GAS PNL BLDV: SIGNIFICANT CHANGE UP
GLUCOSE BLDC GLUCOMTR-MCNC: 117 MG/DL — HIGH (ref 70–99)
GLUCOSE BLDC GLUCOMTR-MCNC: 122 MG/DL — HIGH (ref 70–99)
GLUCOSE BLDC GLUCOMTR-MCNC: 132 MG/DL — HIGH (ref 70–99)
GLUCOSE SERPL-MCNC: 130 MG/DL — HIGH (ref 70–99)
GLUCOSE SERPL-MCNC: 132 MG/DL — HIGH (ref 70–99)
GLUCOSE SERPL-MCNC: 154 MG/DL — HIGH (ref 70–99)
GLUCOSE SERPL-MCNC: 157 MG/DL — HIGH (ref 70–99)
GLUCOSE SERPL-MCNC: 194 MG/DL — HIGH (ref 70–99)
GLUCOSE SERPL-MCNC: 245 MG/DL — HIGH (ref 70–99)
HCT VFR BLD CALC: 26.5 % — LOW (ref 39–50)
HCT VFR BLD CALC: 27.4 % — LOW (ref 39–50)
HCT VFR BLD CALC: 32.6 % — LOW (ref 39–50)
HCT VFR BLD CALC: 35.2 % — LOW (ref 39–50)
HCT VFR BLD CALC: 35.6 % — LOW (ref 39–50)
HCT VFR BLD CALC: 36 % — LOW (ref 39–50)
HGB BLD-MCNC: 10.2 G/DL — LOW (ref 13–17)
HGB BLD-MCNC: 10.9 G/DL — LOW (ref 13–17)
HGB BLD-MCNC: 11.1 G/DL — LOW (ref 13–17)
HGB BLD-MCNC: 11.7 G/DL — LOW (ref 13–17)
HGB BLD-MCNC: 8.2 G/DL — LOW (ref 13–17)
HGB BLD-MCNC: 8.7 G/DL — LOW (ref 13–17)
INR BLD: 1.16 RATIO — SIGNIFICANT CHANGE UP (ref 0.88–1.16)
INR BLD: 1.18 RATIO — HIGH (ref 0.88–1.16)
MAGNESIUM SERPL-MCNC: 1.8 MG/DL — SIGNIFICANT CHANGE UP (ref 1.6–2.6)
MAGNESIUM SERPL-MCNC: 1.9 MG/DL — SIGNIFICANT CHANGE UP (ref 1.6–2.6)
MAGNESIUM SERPL-MCNC: 2 MG/DL — SIGNIFICANT CHANGE UP (ref 1.6–2.6)
MAGNESIUM SERPL-MCNC: 2.2 MG/DL — SIGNIFICANT CHANGE UP (ref 1.6–2.6)
MAGNESIUM SERPL-MCNC: 2.4 MG/DL — SIGNIFICANT CHANGE UP (ref 1.6–2.6)
MAGNESIUM SERPL-MCNC: 2.4 MG/DL — SIGNIFICANT CHANGE UP (ref 1.6–2.6)
MCHC RBC-ENTMCNC: 30.6 GM/DL — LOW (ref 32–36)
MCHC RBC-ENTMCNC: 30.9 GM/DL — LOW (ref 32–36)
MCHC RBC-ENTMCNC: 31.3 GM/DL — LOW (ref 32–36)
MCHC RBC-ENTMCNC: 31.5 GM/DL — LOW (ref 32–36)
MCHC RBC-ENTMCNC: 31.8 GM/DL — LOW (ref 32–36)
MCHC RBC-ENTMCNC: 31.9 PG — SIGNIFICANT CHANGE UP (ref 27–34)
MCHC RBC-ENTMCNC: 32.3 PG — SIGNIFICANT CHANGE UP (ref 27–34)
MCHC RBC-ENTMCNC: 32.3 PG — SIGNIFICANT CHANGE UP (ref 27–34)
MCHC RBC-ENTMCNC: 32.5 GM/DL — SIGNIFICANT CHANGE UP (ref 32–36)
MCHC RBC-ENTMCNC: 32.6 PG — SIGNIFICANT CHANGE UP (ref 27–34)
MCHC RBC-ENTMCNC: 33.1 PG — SIGNIFICANT CHANGE UP (ref 27–34)
MCHC RBC-ENTMCNC: 33.4 PG — SIGNIFICANT CHANGE UP (ref 27–34)
MCV RBC AUTO: 101.9 FL — HIGH (ref 80–100)
MCV RBC AUTO: 102.9 FL — HIGH (ref 80–100)
MCV RBC AUTO: 103.1 FL — HIGH (ref 80–100)
MCV RBC AUTO: 103.5 FL — HIGH (ref 80–100)
MCV RBC AUTO: 105.6 FL — HIGH (ref 80–100)
MCV RBC AUTO: 105.8 FL — HIGH (ref 80–100)
NRBC # BLD: 0 /100 WBCS — SIGNIFICANT CHANGE UP (ref 0–0)
PHOSPHATE SERPL-MCNC: 2.5 MG/DL — SIGNIFICANT CHANGE UP (ref 2.5–4.5)
PHOSPHATE SERPL-MCNC: 3.4 MG/DL — SIGNIFICANT CHANGE UP (ref 2.5–4.5)
PHOSPHATE SERPL-MCNC: 4.7 MG/DL — HIGH (ref 2.5–4.5)
PHOSPHATE SERPL-MCNC: 5.5 MG/DL — HIGH (ref 2.5–4.5)
PHOSPHATE SERPL-MCNC: 5.8 MG/DL — HIGH (ref 2.5–4.5)
PHOSPHATE SERPL-MCNC: 5.8 MG/DL — HIGH (ref 2.5–4.5)
PLATELET # BLD AUTO: 111 K/UL — LOW (ref 150–400)
PLATELET # BLD AUTO: 119 K/UL — LOW (ref 150–400)
PLATELET # BLD AUTO: 159 K/UL — SIGNIFICANT CHANGE UP (ref 150–400)
PLATELET # BLD AUTO: 179 K/UL — SIGNIFICANT CHANGE UP (ref 150–400)
PLATELET # BLD AUTO: 195 K/UL — SIGNIFICANT CHANGE UP (ref 150–400)
PLATELET # BLD AUTO: 202 K/UL — SIGNIFICANT CHANGE UP (ref 150–400)
POTASSIUM SERPL-MCNC: 4 MMOL/L — SIGNIFICANT CHANGE UP (ref 3.5–5.3)
POTASSIUM SERPL-MCNC: 4 MMOL/L — SIGNIFICANT CHANGE UP (ref 3.5–5.3)
POTASSIUM SERPL-MCNC: 4.2 MMOL/L — SIGNIFICANT CHANGE UP (ref 3.5–5.3)
POTASSIUM SERPL-MCNC: 4.2 MMOL/L — SIGNIFICANT CHANGE UP (ref 3.5–5.3)
POTASSIUM SERPL-MCNC: 4.3 MMOL/L — SIGNIFICANT CHANGE UP (ref 3.5–5.3)
POTASSIUM SERPL-MCNC: 4.4 MMOL/L — SIGNIFICANT CHANGE UP (ref 3.5–5.3)
POTASSIUM SERPL-SCNC: 4 MMOL/L — SIGNIFICANT CHANGE UP (ref 3.5–5.3)
POTASSIUM SERPL-SCNC: 4 MMOL/L — SIGNIFICANT CHANGE UP (ref 3.5–5.3)
POTASSIUM SERPL-SCNC: 4.2 MMOL/L — SIGNIFICANT CHANGE UP (ref 3.5–5.3)
POTASSIUM SERPL-SCNC: 4.2 MMOL/L — SIGNIFICANT CHANGE UP (ref 3.5–5.3)
POTASSIUM SERPL-SCNC: 4.3 MMOL/L — SIGNIFICANT CHANGE UP (ref 3.5–5.3)
POTASSIUM SERPL-SCNC: 4.4 MMOL/L — SIGNIFICANT CHANGE UP (ref 3.5–5.3)
PROT SERPL-MCNC: 5.6 G/DL — LOW (ref 6–8.3)
PROT SERPL-MCNC: 5.7 G/DL — LOW (ref 6–8.3)
PROT SERPL-MCNC: 6 G/DL — SIGNIFICANT CHANGE UP (ref 6–8.3)
PROT SERPL-MCNC: 6 G/DL — SIGNIFICANT CHANGE UP (ref 6–8.3)
PROT SERPL-MCNC: 6.2 G/DL — SIGNIFICANT CHANGE UP (ref 6–8.3)
PROT SERPL-MCNC: 6.5 G/DL — SIGNIFICANT CHANGE UP (ref 6–8.3)
PROTHROM AB SERPL-ACNC: 13.8 SEC — HIGH (ref 10.6–13.6)
PROTHROM AB SERPL-ACNC: 14 SEC — HIGH (ref 10.6–13.6)
RBC # BLD: 2.57 M/UL — LOW (ref 4.2–5.8)
RBC # BLD: 2.69 M/UL — LOW (ref 4.2–5.8)
RBC # BLD: 3.08 M/UL — LOW (ref 4.2–5.8)
RBC # BLD: 3.37 M/UL — LOW (ref 4.2–5.8)
RBC # BLD: 3.4 M/UL — LOW (ref 4.2–5.8)
RBC # BLD: 3.5 M/UL — LOW (ref 4.2–5.8)
RBC # FLD: 14.2 % — SIGNIFICANT CHANGE UP (ref 10.3–14.5)
RBC # FLD: 14.3 % — SIGNIFICANT CHANGE UP (ref 10.3–14.5)
RBC # FLD: 14.3 % — SIGNIFICANT CHANGE UP (ref 10.3–14.5)
RBC # FLD: 14.4 % — SIGNIFICANT CHANGE UP (ref 10.3–14.5)
RH IG SCN BLD-IMP: POSITIVE — SIGNIFICANT CHANGE UP
SARS-COV-2 IGG+IGM SERPL QL IA: 9.15 U/ML — HIGH
SARS-COV-2 IGG+IGM SERPL QL IA: POSITIVE
SODIUM SERPL-SCNC: 136 MMOL/L — SIGNIFICANT CHANGE UP (ref 135–145)
SODIUM SERPL-SCNC: 137 MMOL/L — SIGNIFICANT CHANGE UP (ref 135–145)
SODIUM SERPL-SCNC: 137 MMOL/L — SIGNIFICANT CHANGE UP (ref 135–145)
SPECIMEN SOURCE: SIGNIFICANT CHANGE UP
TROPONIN T, HIGH SENSITIVITY RESULT: 18 NG/L — SIGNIFICANT CHANGE UP (ref 0–51)
TROPONIN T, HIGH SENSITIVITY RESULT: 20 NG/L — SIGNIFICANT CHANGE UP (ref 0–51)
WBC # BLD: 10.76 K/UL — HIGH (ref 3.8–10.5)
WBC # BLD: 13.07 K/UL — HIGH (ref 3.8–10.5)
WBC # BLD: 13.08 K/UL — HIGH (ref 3.8–10.5)
WBC # BLD: 17.77 K/UL — HIGH (ref 3.8–10.5)
WBC # BLD: 4.18 K/UL — SIGNIFICANT CHANGE UP (ref 3.8–10.5)
WBC # BLD: 5.12 K/UL — SIGNIFICANT CHANGE UP (ref 3.8–10.5)
WBC # FLD AUTO: 10.76 K/UL — HIGH (ref 3.8–10.5)
WBC # FLD AUTO: 13.07 K/UL — HIGH (ref 3.8–10.5)
WBC # FLD AUTO: 13.08 K/UL — HIGH (ref 3.8–10.5)
WBC # FLD AUTO: 17.77 K/UL — HIGH (ref 3.8–10.5)
WBC # FLD AUTO: 4.18 K/UL — SIGNIFICANT CHANGE UP (ref 3.8–10.5)
WBC # FLD AUTO: 5.12 K/UL — SIGNIFICANT CHANGE UP (ref 3.8–10.5)

## 2021-10-27 PROCEDURE — 99291 CRITICAL CARE FIRST HOUR: CPT

## 2021-10-27 PROCEDURE — 93010 ELECTROCARDIOGRAM REPORT: CPT

## 2021-10-27 PROCEDURE — 93306 TTE W/DOPPLER COMPLETE: CPT | Mod: 26

## 2021-10-27 PROCEDURE — G0425: CPT | Mod: GC,95

## 2021-10-27 RX ORDER — HYDROCORTISONE 20 MG
50 TABLET ORAL ONCE
Refills: 0 | Status: COMPLETED | OUTPATIENT
Start: 2021-10-27 | End: 2021-10-27

## 2021-10-27 RX ORDER — ACETAMINOPHEN 500 MG
1000 TABLET ORAL EVERY 6 HOURS
Refills: 0 | Status: COMPLETED | OUTPATIENT
Start: 2021-10-27 | End: 2021-10-27

## 2021-10-27 RX ORDER — SODIUM CHLORIDE 9 MG/ML
1000 INJECTION, SOLUTION INTRAVENOUS ONCE
Refills: 0 | Status: COMPLETED | OUTPATIENT
Start: 2021-10-27 | End: 2021-10-27

## 2021-10-27 RX ORDER — VASOPRESSIN 20 [USP'U]/ML
0.01 INJECTION INTRAVENOUS
Qty: 50 | Refills: 0 | Status: DISCONTINUED | OUTPATIENT
Start: 2021-10-27 | End: 2021-10-28

## 2021-10-27 RX ORDER — MAGNESIUM SULFATE 500 MG/ML
2 VIAL (ML) INJECTION ONCE
Refills: 0 | Status: COMPLETED | OUTPATIENT
Start: 2021-10-27 | End: 2021-10-27

## 2021-10-27 RX ORDER — INSULIN HUMAN 100 [IU]/ML
2 INJECTION, SOLUTION SUBCUTANEOUS
Qty: 100 | Refills: 0 | Status: DISCONTINUED | OUTPATIENT
Start: 2021-10-27 | End: 2021-10-27

## 2021-10-27 RX ORDER — ALBUMIN HUMAN 25 %
250 VIAL (ML) INTRAVENOUS ONCE
Refills: 0 | Status: COMPLETED | OUTPATIENT
Start: 2021-10-27 | End: 2021-10-27

## 2021-10-27 RX ORDER — PANTOPRAZOLE SODIUM 20 MG/1
40 TABLET, DELAYED RELEASE ORAL EVERY 12 HOURS
Refills: 0 | Status: DISCONTINUED | OUTPATIENT
Start: 2021-10-27 | End: 2021-10-27

## 2021-10-27 RX ORDER — PHENYLEPHRINE HYDROCHLORIDE 10 MG/ML
0.5 INJECTION INTRAVENOUS
Qty: 40 | Refills: 0 | Status: DISCONTINUED | OUTPATIENT
Start: 2021-10-27 | End: 2021-10-27

## 2021-10-27 RX ORDER — EPINEPHRINE 0.3 MG/.3ML
0.05 INJECTION INTRAMUSCULAR; SUBCUTANEOUS
Qty: 4 | Refills: 0 | Status: DISCONTINUED | OUTPATIENT
Start: 2021-10-27 | End: 2021-10-27

## 2021-10-27 RX ORDER — CHLORHEXIDINE GLUCONATE 213 G/1000ML
1 SOLUTION TOPICAL
Refills: 0 | Status: DISCONTINUED | OUTPATIENT
Start: 2021-10-27 | End: 2021-11-03

## 2021-10-27 RX ORDER — FENTANYL CITRATE 50 UG/ML
25 INJECTION INTRAVENOUS
Refills: 0 | Status: DISCONTINUED | OUTPATIENT
Start: 2021-10-27 | End: 2021-10-28

## 2021-10-27 RX ORDER — KETOROLAC TROMETHAMINE 30 MG/ML
15 SYRINGE (ML) INJECTION EVERY 6 HOURS
Refills: 0 | Status: DISCONTINUED | OUTPATIENT
Start: 2021-10-27 | End: 2021-10-27

## 2021-10-27 RX ORDER — VASOPRESSIN 20 [USP'U]/ML
0.03 INJECTION INTRAVENOUS
Qty: 50 | Refills: 0 | Status: DISCONTINUED | OUTPATIENT
Start: 2021-10-27 | End: 2021-10-27

## 2021-10-27 RX ORDER — HEPARIN SODIUM 5000 [USP'U]/ML
5000 INJECTION INTRAVENOUS; SUBCUTANEOUS EVERY 8 HOURS
Refills: 0 | Status: DISCONTINUED | OUTPATIENT
Start: 2021-10-27 | End: 2021-11-15

## 2021-10-27 RX ORDER — SODIUM CHLORIDE 9 MG/ML
1000 INJECTION, SOLUTION INTRAVENOUS
Refills: 0 | Status: DISCONTINUED | OUTPATIENT
Start: 2021-10-27 | End: 2021-10-28

## 2021-10-27 RX ORDER — SODIUM CHLORIDE 9 MG/ML
500 INJECTION, SOLUTION INTRAVENOUS ONCE
Refills: 0 | Status: COMPLETED | OUTPATIENT
Start: 2021-10-27 | End: 2021-10-27

## 2021-10-27 RX ORDER — DIPHENHYDRAMINE HCL 50 MG
25 CAPSULE ORAL ONCE
Refills: 0 | Status: COMPLETED | OUTPATIENT
Start: 2021-10-27 | End: 2021-10-27

## 2021-10-27 RX ORDER — SODIUM CHLORIDE 9 MG/ML
1000 INJECTION, SOLUTION INTRAVENOUS ONCE
Refills: 0 | Status: DISCONTINUED | OUTPATIENT
Start: 2021-10-27 | End: 2021-10-27

## 2021-10-27 RX ORDER — INSULIN LISPRO 100/ML
VIAL (ML) SUBCUTANEOUS EVERY 4 HOURS
Refills: 0 | Status: DISCONTINUED | OUTPATIENT
Start: 2021-10-27 | End: 2021-10-28

## 2021-10-27 RX ORDER — MAGNESIUM SULFATE 500 MG/ML
2 VIAL (ML) INJECTION
Refills: 0 | Status: COMPLETED | OUTPATIENT
Start: 2021-10-27 | End: 2021-10-27

## 2021-10-27 RX ORDER — PANTOPRAZOLE SODIUM 20 MG/1
40 TABLET, DELAYED RELEASE ORAL EVERY 12 HOURS
Refills: 0 | Status: DISCONTINUED | OUTPATIENT
Start: 2021-10-27 | End: 2021-10-31

## 2021-10-27 RX ADMIN — Medication 400 MILLIGRAM(S): at 12:07

## 2021-10-27 RX ADMIN — Medication 1000 MILLIGRAM(S): at 06:08

## 2021-10-27 RX ADMIN — FENTANYL CITRATE 25 MICROGRAM(S): 50 INJECTION INTRAVENOUS at 11:03

## 2021-10-27 RX ADMIN — FENTANYL CITRATE 25 MICROGRAM(S): 50 INJECTION INTRAVENOUS at 14:25

## 2021-10-27 RX ADMIN — Medication 400 MILLIGRAM(S): at 17:04

## 2021-10-27 RX ADMIN — Medication 750 MILLILITER(S): at 11:50

## 2021-10-27 RX ADMIN — Medication 400 MILLIGRAM(S): at 06:01

## 2021-10-27 RX ADMIN — Medication 50 GRAM(S): at 02:20

## 2021-10-27 RX ADMIN — Medication 50 GRAM(S): at 17:26

## 2021-10-27 RX ADMIN — FENTANYL CITRATE 25 MICROGRAM(S): 50 INJECTION INTRAVENOUS at 11:18

## 2021-10-27 RX ADMIN — Medication 50 MILLIGRAM(S): at 04:58

## 2021-10-27 RX ADMIN — Medication 50 GRAM(S): at 00:58

## 2021-10-27 RX ADMIN — FENTANYL CITRATE 25 MICROGRAM(S): 50 INJECTION INTRAVENOUS at 14:40

## 2021-10-27 RX ADMIN — Medication 400 MILLIGRAM(S): at 23:00

## 2021-10-27 RX ADMIN — PANTOPRAZOLE SODIUM 40 MILLIGRAM(S): 20 TABLET, DELAYED RELEASE ORAL at 06:01

## 2021-10-27 RX ADMIN — Medication 1000 MILLIGRAM(S): at 23:30

## 2021-10-27 RX ADMIN — Medication 25 MILLIGRAM(S): at 02:28

## 2021-10-27 RX ADMIN — Medication 25 MILLIGRAM(S): at 10:10

## 2021-10-27 RX ADMIN — SODIUM CHLORIDE 3000 MILLILITER(S): 9 INJECTION, SOLUTION INTRAVENOUS at 11:48

## 2021-10-27 RX ADMIN — SODIUM CHLORIDE 1000 MILLILITER(S): 9 INJECTION, SOLUTION INTRAVENOUS at 02:42

## 2021-10-27 RX ADMIN — SODIUM CHLORIDE 2000 MILLILITER(S): 9 INJECTION, SOLUTION INTRAVENOUS at 03:30

## 2021-10-27 RX ADMIN — Medication 750 MILLILITER(S): at 12:19

## 2021-10-27 RX ADMIN — Medication 25 MILLIGRAM(S): at 14:25

## 2021-10-27 RX ADMIN — Medication 25 MILLIGRAM(S): at 04:58

## 2021-10-27 RX ADMIN — Medication 1000 MILLIGRAM(S): at 17:19

## 2021-10-27 RX ADMIN — Medication 750 MILLILITER(S): at 13:16

## 2021-10-27 RX ADMIN — HEPARIN SODIUM 5000 UNIT(S): 5000 INJECTION INTRAVENOUS; SUBCUTANEOUS at 13:51

## 2021-10-27 RX ADMIN — CHLORHEXIDINE GLUCONATE 1 APPLICATION(S): 213 SOLUTION TOPICAL at 06:09

## 2021-10-27 RX ADMIN — FENTANYL CITRATE 25 MICROGRAM(S): 50 INJECTION INTRAVENOUS at 20:28

## 2021-10-27 RX ADMIN — Medication 6: at 11:35

## 2021-10-27 RX ADMIN — PANTOPRAZOLE SODIUM 40 MILLIGRAM(S): 20 TABLET, DELAYED RELEASE ORAL at 17:09

## 2021-10-27 RX ADMIN — SODIUM CHLORIDE 4000 MILLILITER(S): 9 INJECTION, SOLUTION INTRAVENOUS at 08:45

## 2021-10-27 RX ADMIN — Medication 1000 MILLIGRAM(S): at 12:22

## 2021-10-27 RX ADMIN — FENTANYL CITRATE 25 MICROGRAM(S): 50 INJECTION INTRAVENOUS at 20:13

## 2021-10-27 RX ADMIN — HEPARIN SODIUM 5000 UNIT(S): 5000 INJECTION INTRAVENOUS; SUBCUTANEOUS at 21:27

## 2021-10-27 NOTE — PROGRESS NOTE ADULT - SUBJECTIVE AND OBJECTIVE BOX
24h Events:  No acute events overnight.    Subjective:   Patient seen at bedside this AM. Denies n/v, SOB, CP, fevers/chills.    Objective:  Vital Signs  T(C): 36.2 (10-27 @ 03:00), Max: 36.5 (10-26 @ 17:30)  HR: 92 (10-27 @ 04:00) (65 - 112)  BP: 86/63 (10-26 @ 20:00) (86/63 - 136/71)  RR: 10 (10-27 @ 04:00) (10 - 24)  SpO2: 95% (10-27 @ 04:00) (91% - 100%)  10-26-21 @ 07:01  -  10-27-21 @ 04:49  --------------------------------------------------------  IN:  Total IN: 0 mL    OUT:    Bulb (mL): 70 mL    Bulb (mL): 170 mL    Indwelling Catheter - Urethral (mL): 420 mL    Nasogastric/Oral tube (mL): 50 mL  Total OUT: 710 mL    Total NET: -710 mL          Physical Exam:  GEN:   RESP:   ABD:   EXTR:   NEURO:     Labs:                        10.9   13.08 )-----------( 179      ( 27 Oct 2021 03:07 )             35.6   10-27    137  |  104  |  15  ----------------------------<  154<H>  4.3   |  19<L>  |  0.97    Ca    8.3<L>      27 Oct 2021 03:07  Phos  5.8     10-27  Mg     2.4     10-27    TPro  5.7<L>  /  Alb  3.0<L>  /  TBili  0.6  /  DBili  x   /  AST  35  /  ALT  11  /  AlkPhos  73  10-27    CAPILLARY BLOOD GLUCOSE      POCT Blood Glucose.: 139 mg/dL (26 Oct 2021 23:54)  POCT Blood Glucose.: 122 mg/dL (26 Oct 2021 18:05)      Imaging:

## 2021-10-27 NOTE — CONSULT NOTE ADULT - ASSESSMENT
Patient is a 80 year old gentleman with pancreatic cancer (dx 3/2021, chemo) s/p ERCP s/p whipple 3/2021, HTN, HLD, CHF (EF: 35%), CAD s/p stents x2 (~15 years ago), AICD (implanted 2005, extracted and re-implantation 9/2021), TAVR (4/2021), bilateral PE (7/2021) on Xarelto, spinal stenosis, macular degeneration, GERD, BPH, Left THR (x2 revisions), left femur fracture (hardware), Osteomyelitis right foot s/p right hallux amputation 9/2021, MSSA bacteremia, PICC Line RUE was on IV antibiotics, recently changed to PO, admitted for whipple procedure, now post-op day 1, with allergy team consulted for concern allergic reaction. Patient was given benadryl 4 hours after procedure for pruritis, although it is unclear if he developed hives at that time. The documentation suggests the rash developed or worsened within 45 minutes of hypotension, and there was no associated SOB or trouble breathing. This may have been an allergic reaction, although it is unclear. The only medications administered between 8pm and 2am include Diluadid, tylenol and benadryl (which he has continued to tolerated). I Patient is a 80 year old gentleman with pancreatic cancer (dx 3/2021, chemo) s/p ERCP s/p whipple 3/2021, HTN, HLD, CHF (EF: 35%), CAD s/p stents x2 (~15 years ago), AICD (implanted 2005, extracted and re-implantation 9/2021), TAVR (4/2021), bilateral PE (7/2021) on Xarelto, spinal stenosis, macular degeneration, GERD, BPH, Left THR (x2 revisions), left femur fracture (hardware), Osteomyelitis right foot s/p right hallux amputation 9/2021, MSSA bacteremia, PICC Line RUE was on IV antibiotics, recently changed to PO, admitted for whipple procedure, now post-op day 1, with allergy team consulted for concern allergic reaction. Patient was given benadryl 4 hours after procedure for pruritis, although it is unclear if he developed hives at that time. The documentation suggests the rash developed or worsened within 45 minutes of hypotension, and there was no associated SOB or trouble breathing. This may have been an allergic reaction, although it is unclear. The only medications administered between 8pm and 2am include Diluadid, tylenol and benadryl. He has continued to tolerate benadryl and IV tylenol without recurrence of symptoms. Tryptase levels can be measured soon after a reaction to help identify anaphylaxis, however, it is most helpful when measured within 2 hours of the reaction, and likely would not be helpful at this time. Unfortunately, we are not able to test for allergies, as he has been on steroids and antihistamines, rendering the results of such testing unreliable. Would recommend staying away from dilaudid for now and continuing supportive care with antihistamines and benadryl as needed.     Thank you for this consult. Please do not hesitate to reach out to our service if you have any further questions or concerns.  **Note not finalized until attending signature attached.    Noreen Alicia MD, MPH  Fellow, Division of Allergy and Immunology  [facility] [employer] [facility]/[address], [address]  [place] [address]  Tel: [phone]  Patient is a 80 year old gentleman with pancreatic cancer (dx 3/2021, chemo) s/p ERCP s/p whipple 3/2021, HTN, HLD, CHF (EF: 35%), CAD s/p stents x2 (~15 years ago), AICD (implanted 2005, extracted and re-implantation 9/2021), TAVR (4/2021), bilateral PE (7/2021) on Xarelto, spinal stenosis, macular degeneration, GERD, BPH, Left THR (x2 revisions), left femur fracture (hardware), Osteomyelitis right foot s/p right hallux amputation 9/2021, MSSA bacteremia, PICC Line RUE was on IV antibiotics, recently changed to PO, admitted for whipple procedure, now post-op day 1, with allergy team consulted for concern allergic reaction. Patient was given benadryl 4 hours after procedure for pruritis, although it is unclear if he developed hives at that time. The documentation suggests the rash developed or worsened within 45 minutes of hypotension, and there was no associated SOB or trouble breathing. This may have been an allergic reaction, although it is unclear. The only medications administered between 8pm and 2am include Diluadid, tylenol and benadryl. He has continued to tolerate benadryl and IV tylenol without recurrence of symptoms. Tryptase levels can be measured soon after a reaction to help identify anaphylaxis, however, it is most helpful when measured within 2 hours of the reaction, and likely would not be helpful at this time. Unfortunately, we are not able to test for allergies, as he has been on steroids and antihistamines, rendering the results of such testing unreliable. Would recommend staying away from dilaudid for now and continuing supportive care with antihistamines and benadryl as needed. He should follow up in our drug allergy clinic in 6-8 weeks, at which point we would be able to complete allergy testing.    Thank you for this consult. Please do not hesitate to reach out to our service if you have any further questions or concerns.  **Note not finalized until attending signature attached.    Noreen Alicia MD, MPH  Fellow, Division of Allergy and Immunology  [facility] [employer] [facility]/[address], [address]  [place] [address]  Tel: [phone]  Patient is a 80 year old gentleman with pancreatic cancer (dx 3/2021, chemo) s/p ERCP s/p whipple 3/2021, HTN, HLD, CHF (EF: 35%), CAD s/p stents x2 (~15 years ago), AICD (implanted 2005, extracted and re-implantation 9/2021), TAVR (4/2021), bilateral PE (7/2021) on Xarelto, spinal stenosis, macular degeneration, GERD, BPH, Left THR (x2 revisions), left femur fracture (hardware), Osteomyelitis right foot s/p right hallux amputation 9/2021, MSSA bacteremia, PICC Line RUE was on IV antibiotics, recently changed to PO, admitted for whipple procedure, now post-op day 1, with allergy team consulted for concern allergic reaction. Patient was given benadryl 4 hours after procedure for pruritus, although it is unclear if he developed hives at that time. The documentation suggests the rash developed or worsened within 45 minutes of hypotension, and there was no associated SOB or trouble breathing. This may have been an allergic reaction, although it is unclear. The only medications administered between 8pm and 2am include Diluadid, tylenol and benadryl. He has continued to tolerate benadryl and IV tylenol without recurrence of symptoms. Tryptase levels can be measured soon after a reaction to help identify anaphylaxis, however, it is most helpful when measured within 2 hours of the reaction, and likely would not be helpful at this time. Unfortunately, we are not able to test for medication allergy, as he has been on steroids and antihistamines, rendering the results of such testing unreliable. Would recommend avoiding dilaudid for now and continuing supportive care with antihistamines and benadryl as needed. He should follow up in our drug allergy clinic in 4-6 weeks, at which point we would be able to complete allergy testing.    Thank you for this consult. Please do not hesitate to reach out to our service if you have any further questions or concerns.  **Note not finalized until attending signature attached.    Noreen Alicia MD, MPH  Fellow, Division of Allergy and Immunology  [facility] [employer] [facility]/[address], [address]  [place] [address]  Tel: [phone]

## 2021-10-27 NOTE — CONSULT NOTE ADULT - ASSESSMENT
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 He required icd extraction, and a subq icd was replaced.  He is now s/p Whipple procedure    - feeling ok overall, though with hypotension and rash overnight, requiring pressors  - single run of nsvt noted on telemetry, in the setting of the above  - has been intermittently hypotensive, and is currently on epi and vaso this morning  - goal to titrate down pressors today. His baseline BP is usually on the lower side.  - he does not appear to be volume overload, and I do not suspect cardiogenic shock. Check an official echocardiogram.   - can check cvp  - known history of systolic hf (mod lv dysfunction and mod ms)  - hold entresto and bb in setting of hypotension    - no sign of acute ischemia  - nsvt noted as above, which was noted preoperatively as well  - has been on sotalol, which needs to be held in the setting of hypotension  - will eventually restart bb  - cont to watch on telemetry    - history of PE, and has been on xarelto  - resume ac when safe from a surgical perspective    - trend creatinine and electrolytes. Keep K>4, Mg>2  - Other cardiovascular workup will depend on clinical course.  - will follow with you  - very high risk of decompensation, with persistent hypotension. >35 min spent taking care of patient, organizing care and discussing with team.

## 2021-10-27 NOTE — CONSULT NOTE ADULT - SUBJECTIVE AND OBJECTIVE BOX
[facility] - Milo Thomas, Binta Mata, Arian Gu Savella  Office Number: [phone]    Initial Consult Note    CHIEF COMPLAINT: Patient is a 80y old  Male who presents with a chief complaint of "I'm having pancreas surgery" (20 Oct 2021 16:43)      HPI:  Mr. Gatica is a 80 year old male (from [facility]) with PMH pancreatic cancer (dx 3/2021, chemo) s/p ERCP s/p whipple 3/2021, HTN, HLD, CHF (EF: 35%), CAD s/p stents x2 (~15 years ago), AICD (implanted 2005, extracted and re-implantation 9/2021), TAVR (4/2021), bilateral PE (7/2021) on Xarelto, spinal stenosis, macular degeneration, GERD, BPH, Left THR (x2 revisions), left femur fracture (hardware), Osteomyelitis right foot s/p right hallux amputation 9/2021, MSSA bacteremia, PICC Line RUE was on IV antibiotics, recently changed to PO. Pt denies n/v, abdominal pain, or changes in bowel habits. Pt evaluated by Dr. Mercer for a scheduled Whipple procedure on 10/26/21. Pt denies recent travel or sick contact.     He is sp a whipple procedure yesterday. He did have some bradycardia and hypotension intraoperatively.   Overnight, with a short episode of nsvt  did develop a rash, believed from dilaudid pca  was hypotensive, needing ivf, and pressors  currently on epi and vaso this morning      PAST MEDICAL & SURGICAL HISTORY:  HTN (hypertension)    HLD (hyperlipidemia)    GERD (gastroesophageal reflux disease)    Cardiomyopathy    MSSA bacteremia  9/2021    Acute osteomyelitis    Pulmonary embolism    Pancreas cancer  chemo    [place] (hard of hearing)  B/L hearing aids    History of total hip replacement  left X 1, 2 revisions    History of laminectomy  X3    ICD (implantable cardiac defibrillator) in place  implanted 2005, replaced 9/2021    Benign testicular tumor  radiation    Status post amputation of toe of right foot  8/2021    Status post fracture of femur  2017 left with hardware    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    H/O Whipple procedure  2/2021        SOCIAL HISTORY:  No tobacco, ethanol, or drug abuse.    FAMILY HISTORY:  Family history of stroke (Mother)      No family history of acute MI or sudden cardiac death.    MEDICATIONS  (STANDING):  acetaminophen   IVPB .. 1000 milliGRAM(s) IV Intermittent every 6 hours  chlorhexidine 2% Cloths 1 Application(s) Topical <User Schedule>  EPINEPHrine    Infusion 0.05 MICROgram(s)/kG/Min (17.9 mL/Hr) IV Continuous <Continuous>  heparin   Injectable 5000 Unit(s) SubCutaneous every 8 hours  influenza   Vaccine 0.5 milliLiter(s) IntraMuscular once  insulin lispro (ADMELOG) corrective regimen sliding scale   SubCutaneous every 6 hours  lactated ringers Bolus 1000 milliLiter(s) IV Bolus once  lactated ringers. 1000 milliLiter(s) (100 mL/Hr) IV Continuous <Continuous>  pantoprazole  Injectable 40 milliGRAM(s) IV Push every 12 hours  vasopressin Infusion 0.03 Unit(s)/Min (1.8 mL/Hr) IV Continuous <Continuous>    MEDICATIONS  (PRN):  diphenhydrAMINE Injectable 25 milliGRAM(s) IV Push every 4 hours PRN Pruritus      Allergies    Dilaudid (Anaphylaxis; Hives)    Intolerances        REVIEW OF SYSTEMS:    CONSTITUTIONAL: reports weakness, no fevers or chills  EYES/ENT: No visual changes;  No vertigo or throat pain   NECK: No pain or stiffness  RESPIRATORY: No cough, wheezing, hemoptysis; No shortness of breath  CARDIOVASCULAR: No chest pain or palpitations  GASTROINTESTINAL: reports abdominal pain. No nausea, vomiting, or hematemesis; No diarrhea or constipation. No melena or hematochezia.  GENITOURINARY: No dysuria, frequency or hematuria  NEUROLOGICAL: No numbness or weakness  SKIN: No itching or rash  All other review of systems is negative unless indicated above    VITAL SIGNS:   Vital Signs Last 24 Hrs  T(C): 36.2 (27 Oct 2021 03:00), Max: 36.5 (26 Oct 2021 17:30)  T(F): 97.2 (27 Oct 2021 03:00), Max: 97.7 (26 Oct 2021 17:30)  HR: 95 (27 Oct 2021 09:15) (90 - 112)  BP: 86/63 (26 Oct 2021 20:00) (86/63 - 136/71)  BP(mean): 71 (26 Oct 2021 20:00) (70 - 98)  RR: 29 (27 Oct 2021 09:15) (9 - 42)  SpO2: 99% (27 Oct 2021 09:15) (91% - 100%)    I&O's Summary    26 Oct 2021 07:01  -  27 Oct 2021 07:00  --------------------------------------------------------  IN: 3768.1 mL / OUT: 1130 mL / NET: 2638.1 mL    27 Oct 2021 07:01  -  27 Oct 2021 09:32  --------------------------------------------------------  IN: 1268 mL / OUT: 60 mL / NET: 1208 mL        On Exam:    Constitutional: NAD, alert and oriented x 3  Lungs:  Non-labored, breath sounds are decreased bilaterally, anteriorly, No wheezing, rales or rhonchi  Cardiovascular: RRR.  S1 and S2 positive.  + syst murmur at ru/lusb, no rubs, gallops or clicks  Gastrointestinal: abd distended, though soft, post surgery  Lymph: No peripheral edema. No cervical lymphadenopathy.  Neurological: Alert, no focal deficits  Skin: No rashes or ulcers   Psych:  Mood & affect appropriate.    LABS: All Labs Reviewed:                        11.1   13.07 )-----------( 195      ( 27 Oct 2021 07:20 )             35.2                         10.9   13.08 )-----------( 179      ( 27 Oct 2021 03:07 )             35.6                         11.7   17.77 )-----------( 202      ( 27 Oct 2021 00:08 )             36.0     27 Oct 2021 07:18    136    |  103    |  17     ----------------------------<  194    4.2     |  17     |  1.09   27 Oct 2021 03:07    137    |  104    |  15     ----------------------------<  154    4.3     |  19     |  0.97   27 Oct 2021 00:08    137    |  104    |  13     ----------------------------<  157    4.4     |  19     |  0.84     Ca    8.4        27 Oct 2021 07:18  Ca    8.3        27 Oct 2021 03:07  Ca    8.6        27 Oct 2021 00:08  Phos  5.5       27 Oct 2021 07:18  Phos  5.8       27 Oct 2021 03:07  Phos  5.8       27 Oct 2021 00:08  Mg     2.4       27 Oct 2021 07:18  Mg     2.4       27 Oct 2021 03:07  Mg     1.8       27 Oct 2021 00:08    TPro  6.2    /  Alb  3.3    /  TBili  0.7    /  DBili  x      /  AST  38     /  ALT  11     /  AlkPhos  79     27 Oct 2021 07:18  TPro  5.7    /  Alb  3.0    /  TBili  0.6    /  DBili  x      /  AST  35     /  ALT  11     /  AlkPhos  73     27 Oct 2021 03:07  TPro  6.5    /  Alb  3.5    /  TBili  0.7    /  DBili  x      /  AST  40     /  ALT  12     /  AlkPhos  87     27 Oct 2021 00:08    PT/INR - ( 27 Oct 2021 03:07 )   PT: 14.0 sec;   INR: 1.18 ratio         PTT - ( 27 Oct 2021 03:07 )  PTT:28.4 sec      Blood Culture:         RADIOLOGY:    TELE: sr, run of nsvt

## 2021-10-27 NOTE — OCCUPATIONAL THERAPY INITIAL EVALUATION ADULT - PERTINENT HX OF CURRENT PROBLEM, REHAB EVAL
80M PMHx pancreatic cancer (dx 3/2021, s/p neoadjuvant therapy), HTN, HLD, CHF (EF: 35%), CAD s/p stents x2 (~15 yrs ago), AICD (implanted 2005, extracted and re-implantation 9/2021), TAVR (4/2021), bilateral PE (7/2021) on Xarelto, spinal stenosis, macular degeneration, GERD, BPH, Left THR (x2 revisions), left femur fx (hardware), osteomyelitis R foot s/p right hallux amputation 9/2021, MSSA bacteremia, PICC Line RUE was on IV antibiotics, recently changed to PO. Patient s/p Whipple procedure.

## 2021-10-27 NOTE — PROGRESS NOTE ADULT - ATTENDING COMMENTS
S/p Whipple, complex medical history. POD1. Hypotension on admission, responding to volume repletion. Appeared to have allergic reaction overnight responded to hydrocortisone, epinephrine, benadryl.   - N Multimodal pain management.   - P NC 2L sat >90. CXR no acute changes. Monitor pulse ox.  - G Bloody output on drain.  - C Follow up on new echo, h/o CHF. V tach overnight, resolved. Vasopressin 0.03, epinephrine 0.07, hypovolemic shock. Lactate 3.7(2.3). Continue resuscitation.  - G NPO, bilious NGT. PPI ppx. monitor. [place] protocol.  - R SILVIANO. Monitor creatinine. UOP 30/h, continue resuscitation, likely pre renal.  - H Hold xarelto. Hgb 11.1(10.9). Monitor.  - DVT SQH/SCDs.    - I Off antibiotics. No evidence of infection. Monitor.  - E ISS, monitor glycemia.    Has life threatening condition requiring SICU evaluation and management.

## 2021-10-27 NOTE — CONSULT NOTE ADULT - SUBJECTIVE AND OBJECTIVE BOX
Telephonic visit    Patient is a 80y old  Male who presents with a chief complaint of "I'm having pancreas surgery" (20 Oct 2021 16:43)    HPI:  Mr. Gatica is a 80 year old male (from [facility]) with PMH pancreatic cancer (dx 3/2021, chemo) s/p ERCP s/p whipple 3/2021, HTN, HLD, CHF (EF: 35%), CAD s/p stents x2 (~15 years ago), AICD (implanted 2005, extracted and re-implantation 9/2021), TAVR (4/2021), bilateral PE (7/2021) on Xarelto, spinal stenosis, macular degeneration, GERD, BPH, Left THR (x2 revisions), left femur fracture (hardware), Osteomyelitis right foot s/p right hallux amputation 9/2021, MSSA bacteremia, PICC Line RUE was on IV antibiotics, recently changed to PO. Pt denies n/v, abdominal pain, or changes in bowel habits. Pt evaluated by Dr. Mercer for a scheduled Whipple procedure on 10/26/21. Pt denies recent travel or sick contact.     **[facility]   (20 Oct 2021 16:43)      Additional history:  Patient went to the OR on 10/26, during which he received fluids, fentanyl, propofol, phenylephrine, rocuronium, cefotetan, ephedrin, dialudid, decadron, tylenol, suggamedex, and metoprolol. He was sent from PACU to the ICU at 16:08. He was given benadryl at 8pm for "pruritis", but was stable. He was stable in the ICU until vitals were taken at 2am on 10/27, at which point his BP was 95/46, with . He was given a bolus of LR. At 2:15am, repeat NBP was 94/43, and he was started on phenylephrine. Blood pressure started to improve by 3am with the phenylephrine.  Within an hour of developing hypotension, he also developed worsening pruritic, erythematous rash (hives) over his arms, face, trunk, and upper thighs. Prior to the reaction, he was on a dilaudid PCA pump (given a push at 6pm and then started on PCA at 8pm) and IV magnesium sulfate. Team was concerned the rash and hypotension was a reaction to dilaudid, which was discontinued. He was given benadryl, tylenol, and hydrocortisone 50mg. Rash resolved by 7am and patient off of phenylephrine. Patient denies any shortness of breath or trouble breathing. No vomiting or diarrhea. No antibiotics were administered post-operatively.  Was also on protonix, administered last at 6am.    He denies ever having a reaction like this before. Denies any known food or drug allergies.   Allergies    Dilaudid (Anaphylaxis; Hives)    Intolerances      MEDICATIONS  (STANDING):  acetaminophen   IVPB .. 1000 milliGRAM(s) IV Intermittent every 6 hours  chlorhexidine 2% Cloths 1 Application(s) Topical <User Schedule>  heparin   Injectable 5000 Unit(s) SubCutaneous every 8 hours  influenza   Vaccine 0.5 milliLiter(s) IntraMuscular once  insulin regular Infusion 2 Unit(s)/Hr (2 mL/Hr) IV Continuous <Continuous>  lactated ringers. 1000 milliLiter(s) (150 mL/Hr) IV Continuous <Continuous>  pantoprazole  Injectable 40 milliGRAM(s) IV Push every 12 hours  vasopressin Infusion 0.015 Unit(s)/Min (0.9 mL/Hr) IV Continuous <Continuous>    MEDICATIONS  (PRN):  diphenhydrAMINE Injectable 25 milliGRAM(s) IV Push every 4 hours PRN Pruritus  fentaNYL    Injectable 25 MICROGram(s) IV Push every 3 hours PRN pain      PAST MEDICAL & SURGICAL HISTORY:  HTN (hypertension)    HLD (hyperlipidemia)    GERD (gastroesophageal reflux disease)    Cardiomyopathy    MSSA bacteremia  9/2021    Acute osteomyelitis    Pulmonary embolism    Pancreas cancer  chemo    [place] (hard of hearing)  B/L hearing aids    History of total hip replacement  left X 1, 2 revisions    History of laminectomy  X3    ICD (implantable cardiac defibrillator) in place  implanted 2005, replaced 9/2021    Benign testicular tumor  radiation    Status post amputation of toe of right foot  8/2021    Status post fracture of femur  2017 left with hardware    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    H/O Whipple procedure  2/2021        REVIEW OF SYSTEMS  All review of systems negative, except for those marked:  General:		  Eyes:			  ENT:			  Pulmonary:		  Cardiac:		  Gastrointestinal:	  Renal/Urologic:		  Musculoskeletal:	  Skin:		  Neurologic:		  Psychiatric:		  Endocrine:		  Hematologic:		  Allergy/Immune:	      FAMILY HISTORY:  Family history of stroke (Mother)        Vital Signs Last 24 Hrs  T(C): 36.7 (27 Oct 2021 12:30), Max: 36.7 (27 Oct 2021 12:30)  T(F): 98.1 (27 Oct 2021 12:30), Max: 98.1 (27 Oct 2021 12:30)  HR: 98 (27 Oct 2021 14:00) (90 - 112)  BP: 86/63 (26 Oct 2021 20:00) (86/63 - 136/71)  BP(mean): 71 (26 Oct 2021 20:00) (70 - 98)  RR: 23 (27 Oct 2021 14:00) (9 - 42)  SpO2: 97% (27 Oct 2021 14:00) (91% - 100%)    PHYSICAL EXAM  Deferred    Lab Results:                        10.2   10.76 )-----------( 159      ( 27 Oct 2021 11:22 )             32.6     10-27    136  |  101  |  17  ----------------------------<  245<H>  4.2   |  13<L>  |  1.07    Ca    8.0<L>      27 Oct 2021 11:22  Phos  4.7     10-27  Mg     2.0     10-27    TPro  6.0  /  Alb  3.3  /  TBili  0.5  /  DBili  x   /  AST  36  /  ALT  13  /  AlkPhos  73  10-27    LIVER FUNCTIONS - ( 27 Oct 2021 11:22 )  Alb: 3.3 g/dL / Pro: 6.0 g/dL / ALK PHOS: 73 U/L / ALT: 13 U/L / AST: 36 U/L / GGT: x           PT/INR - ( 27 Oct 2021 03:07 )   PT: 14.0 sec;   INR: 1.18 ratio         PTT - ( 27 Oct 2021 03:07 )  PTT:28.4 sec      IMAGING STUDIES:      Telephonic visit    Patient is a 80y old  Male who presents with a chief complaint of "I'm having pancreas surgery" (20 Oct 2021 16:43)    HPI:  Mr. Gatica is a 80 year old male (from [facility]) with PMH pancreatic cancer (dx 3/2021, chemo) s/p ERCP s/p whipple 3/2021, HTN, HLD, CHF (EF: 35%), CAD s/p stents x2 (~15 years ago), AICD (implanted 2005, extracted and re-implantation 9/2021), TAVR (4/2021), bilateral PE (7/2021) on Xarelto, spinal stenosis, macular degeneration, GERD, BPH, Left THR (x2 revisions), left femur fracture (hardware), Osteomyelitis right foot s/p right hallux amputation 9/2021, MSSA bacteremia, PICC Line RUE was on IV antibiotics, recently changed to PO. Pt denies n/v, abdominal pain, or changes in bowel habits. Pt evaluated by Dr. Mercer for a scheduled Whipple procedure on 10/26/21. Pt denies recent travel or sick contact.     **Covid swab on 10/23/21 at [facility]   (20 Oct 2021 16:43)      Additional history:  Patient went to the OR on 10/26, during which he received fluids, fentanyl, propofol, phenylephrine, rocuronium, cefotetan, ephedrin, dialudid, decadron, tylenol, suggamedex, and metoprolol. After surgery, we was transferred from PACU to the ICU at 16:08. He was given benadryl at 8pm for "pruritus", but was stable. He was stable in the ICU until vitals were taken at 2am on 10/27, at which point his BP was 95/46, with . He was given a bolus of LR. At 2:15am, repeat BP was 94/43, and he was started on phenylephrine. Blood pressure started to improve by 3am with the phenylephrine.  Within an hour of developing hypotension, he also developed worsening pruritic, erythematous rash (hives) over his arms, face, trunk, and upper thighs. Prior to the reaction, he was on a dilaudid PCA pump (given a push at 6pm and then started on PCA at 8pm) and IV magnesium sulfate. Team was concerned the rash and hypotension was a reaction to dilaudid, which was discontinued. He was given benadryl, tylenol, and hydrocortisone 50mg. Rash resolved by 7am and patient off of phenylephrine. Patient denies any shortness of breath or trouble breathing. No vomiting or diarrhea. No antibiotics were administered post-operatively.  Was also on protonix, administered last at 6am.    He denies ever having a reaction like this before. Denies any known food or drug allergies.           Allergies    Dilaudid (Anaphylaxis; Hives)    Intolerances      MEDICATIONS  (STANDING):  acetaminophen   IVPB .. 1000 milliGRAM(s) IV Intermittent every 6 hours  chlorhexidine 2% Cloths 1 Application(s) Topical <User Schedule>  heparin   Injectable 5000 Unit(s) SubCutaneous every 8 hours  influenza   Vaccine 0.5 milliLiter(s) IntraMuscular once  insulin regular Infusion 2 Unit(s)/Hr (2 mL/Hr) IV Continuous <Continuous>  lactated ringers. 1000 milliLiter(s) (150 mL/Hr) IV Continuous <Continuous>  pantoprazole  Injectable 40 milliGRAM(s) IV Push every 12 hours  vasopressin Infusion 0.015 Unit(s)/Min (0.9 mL/Hr) IV Continuous <Continuous>    MEDICATIONS  (PRN):  diphenhydrAMINE Injectable 25 milliGRAM(s) IV Push every 4 hours PRN Pruritus  fentaNYL    Injectable 25 MICROGram(s) IV Push every 3 hours PRN pain      PAST MEDICAL & SURGICAL HISTORY:  HTN (hypertension)    HLD (hyperlipidemia)    GERD (gastroesophageal reflux disease)    Cardiomyopathy    MSSA bacteremia  9/2021    Acute osteomyelitis    Pulmonary embolism    Pancreas cancer  chemo    [place] (hard of hearing)  B/L hearing aids    History of total hip replacement  left X 1, 2 revisions    History of laminectomy  X3    ICD (implantable cardiac defibrillator) in place  implanted 2005, replaced 9/2021    Benign testicular tumor  radiation    Status post amputation of toe of right foot  8/2021    Status post fracture of femur  2017 left with hardware    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    H/O Whipple procedure  2/2021        REVIEW OF SYSTEMS  All review of systems negative, except for those marked:  General:		  Eyes:			  ENT:			  Pulmonary:		  Cardiac:		  Gastrointestinal:	  Renal/Urologic:		  Musculoskeletal:	  Skin:		  Neurologic:		  Psychiatric:		  Endocrine:		  Hematologic:		  Allergy/Immune:	      FAMILY HISTORY:  Family history of stroke (Mother)        Vital Signs Last 24 Hrs  T(C): 36.7 (27 Oct 2021 12:30), Max: 36.7 (27 Oct 2021 12:30)  T(F): 98.1 (27 Oct 2021 12:30), Max: 98.1 (27 Oct 2021 12:30)  HR: 98 (27 Oct 2021 14:00) (90 - 112)  BP: 86/63 (26 Oct 2021 20:00) (86/63 - 136/71)  BP(mean): 71 (26 Oct 2021 20:00) (70 - 98)  RR: 23 (27 Oct 2021 14:00) (9 - 42)  SpO2: 97% (27 Oct 2021 14:00) (91% - 100%)    PHYSICAL EXAM  Deferred    Lab Results:                        10.2   10.76 )-----------( 159      ( 27 Oct 2021 11:22 )             32.6     10-27    136  |  101  |  17  ----------------------------<  245<H>  4.2   |  13<L>  |  1.07    Ca    8.0<L>      27 Oct 2021 11:22  Phos  4.7     10-27  Mg     2.0     10-27    TPro  6.0  /  Alb  3.3  /  TBili  0.5  /  DBili  x   /  AST  36  /  ALT  13  /  AlkPhos  73  10-27    LIVER FUNCTIONS - ( 27 Oct 2021 11:22 )  Alb: 3.3 g/dL / Pro: 6.0 g/dL / ALK PHOS: 73 U/L / ALT: 13 U/L / AST: 36 U/L / GGT: x           PT/INR - ( 27 Oct 2021 03:07 )   PT: 14.0 sec;   INR: 1.18 ratio         PTT - ( 27 Oct 2021 03:07 )  PTT:28.4 sec      IMAGING STUDIES:      Telephonic visit    Patient is a 80y old  Male who presents with a chief complaint of "I'm having pancreas surgery" (20 Oct 2021 16:43)    HPI:  Mr. Gatica is a 80 year old male (from [facility]) with PMH pancreatic cancer (dx 3/2021, chemo) s/p ERCP s/p whipple 3/2021, HTN, HLD, CHF (EF: 35%), CAD s/p stents x2 (~15 years ago), AICD (implanted 2005, extracted and re-implantation 9/2021), TAVR (4/2021), bilateral PE (7/2021) on Xarelto, spinal stenosis, macular degeneration, GERD, BPH, Left THR (x2 revisions), left femur fracture (hardware), Osteomyelitis right foot s/p right hallux amputation 9/2021, MSSA bacteremia, PICC Line RUE was on IV antibiotics, recently changed to PO. Pt denies n/v, abdominal pain, or changes in bowel habits. Pt evaluated by Dr. Mercer for a scheduled Whipple procedure on 10/26/21. Pt denies recent travel or sick contact.     **Covid swab on 10/23/21 at [facility]   (20 Oct 2021 16:43)      Additional history:  Patient went to the OR on 10/26, during which he received fluids, fentanyl, propofol, phenylephrine, rocuronium, cefotetan, ephedrin, dialudid, decadron, tylenol, suggamedex, and metoprolol. These medications began being administered at 8:39am, and the last medication administered during the procedure was hydromorphone at 14:56, but it had been given multiple times earlier in the surgery as well. After surgery, we was transferred from PACU to the ICU at 16:08. He was given benadryl at 8pm for "pruritus", but was stable. He was stable in the ICU until vitals were taken at 2am on 10/27, at which point his BP was 95/46, with . He was given a bolus of LR. At 2:15am, repeat BP was 94/43, and he was started on phenylephrine. Blood pressure started to improve by 3am with the phenylephrine.  Within an hour of developing hypotension, he also developed worsening pruritic, erythematous rash (hives) over his arms, face, trunk, and upper thighs. Prior to the reaction, he was on a dilaudid PCA pump (given a push at 6pm and then started on PCA at 8pm) and IV magnesium sulfate. Team was concerned the rash and hypotension was a reaction to dilaudid, which was discontinued. He was given benadryl, tylenol, and hydrocortisone 50mg. Rash resolved by 7am and patient off of phenylephrine. Patient denies any shortness of breath or trouble breathing. No vomiting or diarrhea. No antibiotics were administered post-operatively.  Was also on protonix, administered last at 6am.    He denies ever having a reaction like this before. Denies any known food or drug allergies.           Allergies    Dilaudid (Anaphylaxis; Hives)    Intolerances      MEDICATIONS  (STANDING):  acetaminophen   IVPB .. 1000 milliGRAM(s) IV Intermittent every 6 hours  chlorhexidine 2% Cloths 1 Application(s) Topical <User Schedule>  heparin   Injectable 5000 Unit(s) SubCutaneous every 8 hours  influenza   Vaccine 0.5 milliLiter(s) IntraMuscular once  insulin regular Infusion 2 Unit(s)/Hr (2 mL/Hr) IV Continuous <Continuous>  lactated ringers. 1000 milliLiter(s) (150 mL/Hr) IV Continuous <Continuous>  pantoprazole  Injectable 40 milliGRAM(s) IV Push every 12 hours  vasopressin Infusion 0.015 Unit(s)/Min (0.9 mL/Hr) IV Continuous <Continuous>    MEDICATIONS  (PRN):  diphenhydrAMINE Injectable 25 milliGRAM(s) IV Push every 4 hours PRN Pruritus  fentaNYL    Injectable 25 MICROGram(s) IV Push every 3 hours PRN pain      PAST MEDICAL & SURGICAL HISTORY:  HTN (hypertension)    HLD (hyperlipidemia)    GERD (gastroesophageal reflux disease)    Cardiomyopathy    MSSA bacteremia  9/2021    Acute osteomyelitis    Pulmonary embolism    Pancreas cancer  chemo    [place] (hard of hearing)  B/L hearing aids    History of total hip replacement  left X 1, 2 revisions    History of laminectomy  X3    ICD (implantable cardiac defibrillator) in place  implanted 2005, replaced 9/2021    Benign testicular tumor  radiation    Status post amputation of toe of right foot  8/2021    Status post fracture of femur  2017 left with hardware    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    H/O Whipple procedure  2/2021        REVIEW OF SYSTEMS  All review of systems negative, except for those marked:  General:		  Eyes:			  ENT:			  Pulmonary:		  Cardiac:		  Gastrointestinal:	  Renal/Urologic:		  Musculoskeletal:	  Skin:		  Neurologic:		  Psychiatric:		  Endocrine:		  Hematologic:		  Allergy/Immune:	      FAMILY HISTORY:  Family history of stroke (Mother)        Vital Signs Last 24 Hrs  T(C): 36.7 (27 Oct 2021 12:30), Max: 36.7 (27 Oct 2021 12:30)  T(F): 98.1 (27 Oct 2021 12:30), Max: 98.1 (27 Oct 2021 12:30)  HR: 98 (27 Oct 2021 14:00) (90 - 112)  BP: 86/63 (26 Oct 2021 20:00) (86/63 - 136/71)  BP(mean): 71 (26 Oct 2021 20:00) (70 - 98)  RR: 23 (27 Oct 2021 14:00) (9 - 42)  SpO2: 97% (27 Oct 2021 14:00) (91% - 100%)    PHYSICAL EXAM  Deferred    Lab Results:                        10.2   10.76 )-----------( 159      ( 27 Oct 2021 11:22 )             32.6     10-27    136  |  101  |  17  ----------------------------<  245<H>  4.2   |  13<L>  |  1.07    Ca    8.0<L>      27 Oct 2021 11:22  Phos  4.7     10-27  Mg     2.0     10-27    TPro  6.0  /  Alb  3.3  /  TBili  0.5  /  DBili  x   /  AST  36  /  ALT  13  /  AlkPhos  73  10-27    LIVER FUNCTIONS - ( 27 Oct 2021 11:22 )  Alb: 3.3 g/dL / Pro: 6.0 g/dL / ALK PHOS: 73 U/L / ALT: 13 U/L / AST: 36 U/L / GGT: x           PT/INR - ( 27 Oct 2021 03:07 )   PT: 14.0 sec;   INR: 1.18 ratio         PTT - ( 27 Oct 2021 03:07 )  PTT:28.4 sec      IMAGING STUDIES:

## 2021-10-27 NOTE — PROGRESS NOTE ADULT - ASSESSMENT
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 Patient now s/p Whipple procedure.    PLAN:  NEUROLOGY:  - Pain Control: IV Tylenol 1g q6hr and Dilaudid PCA    RESPIRATORY: B/L PE on Xarelto  - Monitor O2 Sat on NC    CARDIOVASCULAR: HTN, HLD, CHF (EF 35%), CAD s/p Stents x2 (15y ago), TAVR  - Monitor hemodynamics    GASTROINTESTINAL/NUTRITION: GERD, Pancreatic CA  - NPO/NGT @ [facility]  - IV Protonix 40mg Daily per Whipple pathway    /RENAL:  - Jimenez in place  - Monitor I&Os  - Monitor electrolytes, replete as needed    HEMATOLOGIC: b/l PE on xarelto  - Hold SQH and Xarelto monse-op per primary team    INFECTIOUS DISEASE: Hx of R hallux OM, s/p IV abx through RUE Picc  - Trend fever curve and WBC on CBC    ENDOCRINE:  - mISS q6hr per whipple pathway    LINES:  - PIVs  - RUE PICC line  - L radial a-line  - NGT  - Prevena incisional VAC    DISPO:  - SICU  - Full code       80M w/ PMHx pancreatic cancer (dx 3/2021, s/p neoadjuvant therapy), HTN, HLD, CHF (EF: 35%), CAD s/p stents x2 (~15 years ago), AICD (implanted 2005, extracted and re-implantation 9/2021), TAVR (4/2021), bilateral PE (7/2021) on Xarelto, spinal stenosis, macular degeneration, GERD, BPH, Left THR (x2 revisions), left femur fracture (hardware), 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 Patient now s/p Whipple procedure.    PLAN:  NEUROLOGY:  - Pain Control: IV Tylenol 1g q6hr  - Dilaudid PCA paused due to suspected allergic reaction    RESPIRATORY: B/L PE on Xarelto  - Monitor O2 Sat on NC    CARDIOVASCULAR: HTN, HLD, CHF (EF 35%), CAD s/p Stents x2 (15y ago), TAVR, AICD  - Monitor hemodynamics  - Wean negro gtt  - One episode of non-sustained Vtach    GASTROINTESTINAL/NUTRITION: GERD, Pancreatic CA  - NPO/NGT @ intermittent low wall suction  - IV Protonix 40mg BID given bloody NGT output    /RENAL:  - LR @100 ml/hr  - Jimenez in place  - Monitor I&Os  - Monitor electrolytes, replete as needed    HEMATOLOGIC: b/l PE on xarelto  - Hold SQH and Xarelto monse-op per primary team  - Trend H&H    INFECTIOUS DISEASE: Hx of R hallux OM, s/p IV abx through RUE Picc  - Trend fever curve and WBC on CBC    ENDOCRINE:  - mISS q6hr per Whipple pathway    LINES:  - PIVs  - RUE PICC line  - L radial a-line  - NGT  - Prevena incisional VAC    DISPO:  - SICU  - Full code

## 2021-10-27 NOTE — OCCUPATIONAL THERAPY INITIAL EVALUATION ADULT - DIAGNOSIS, OT EVAL
Patient presents with decreased balance, bed mobility strength, endurance impacting ability to perform ADLs and functional mobility

## 2021-10-27 NOTE — PROGRESS NOTE ADULT - SUBJECTIVE AND OBJECTIVE BOX
24 HOUR EVENTS:  - Hypotensive, given 1L IVF bolus, started on negro gtt  - Bloody NGT output  - One episode of nonsustained VTACH overnight  - Developed hives over face, trunk, abdomen and upper thigh, likely allergic reaction to dilaudid. PCA paused.    SUBJECTIVE/ROS:  [x] A ten-point review of systems was otherwise negative except as noted.  [ ] Due to altered mental status/intubation, subjective information were not able to be obtained from the patient. History was obtained, to the extent possible, from review of the chart and collateral sources of information.    NEURO   Exam: awake, alert, oriented  Meds: acetaminophen   IVPB .. 1000 milliGRAM(s) IV Intermittent every 6 hours  HYDROmorphone PCA (1 mG/mL) 30 milliLiter(s) PCA Continuous PCA Continuous  HYDROmorphone PCA (1 mG/mL) Rescue Clinician Bolus 0.5 milliGRAM(s) IV Push every 15 minutes PRN for Pain Scale GREATER THAN 6  ondansetron Injectable 4 milliGRAM(s) IV Push every 6 hours PRN Nausea  [x] Adequacy of sedation and pain control has been assessed and adjusted    RESPIRATORY  RR: 14 (10-27-21 @ 02:00) (12 - 24)  SpO2: 98% (10-27-21 @ 02:00) (91% - 100%)  Exam: unlabored, clear to auscultation bilaterally  Mechanical Ventilation:   ABG - ( 27 Oct 2021 00:03 )  pH: 7.32  /  pCO2: 42    /  pO2: 86    / HCO3: 22    / Base Excess: -4.3  /  SaO2: 95.9      [N/A] Extubation Readiness Assessed  Meds: diphenhydrAMINE Injectable 25 milliGRAM(s) IV Push every 4 hours PRN Pruritus    CARDIOVASCULAR  HR: 100 (10-27-21 @ 02:00) (65 - 112)  BP: 86/63 (10-26-21 @ 20:00) (86/63 - 136/71)  BP(mean): 71 (10-26-21 @ 20:00) (70 - 98)  ABP: 95/46 (10-27-21 @ 02:00) (53/26 - 155/67)  ABP(mean): 61 (10-27-21 @ 02:00) (35 - 96)    Exam: regular rate and rhythm  Cardiac Rhythm: sinus  Perfusion     [x]Adequate   [ ]Inadequate  Mentation   [x]Normal       [ ]Reduced  Extremities  [x]Warm         [ ]Cool  Volume Status [ ]Hypervolemic [x]Euvolemic [ ]Hypovolemic    GI/NUTRITION  Exam: soft, nontender, nondistended, incision C/D/I, MOR serosang x2  Diet: NPO/NGT  Meds: pantoprazole  Injectable 40 milliGRAM(s) IV Push daily      GENITOURINARY  I&O's Detail    10-26 @ 07:01  -  10-27 @ 02:54  --------------------------------------------------------  IN:    IV PiggyBack: 150 mL    Lactated Ringers: 800 mL  Total IN: 950 mL    OUT:    Bulb (mL): 130 mL    Bulb (mL): 70 mL    Indwelling Catheter - Urethral (mL): 370 mL    Nasogastric/Oral tube (mL): 50 mL  Total OUT: 620 mL    Total NET: 330 mL    Weight (kg): 95.3 (10-26 @ 08:10)  10-27    137  |  104  |  13  ----------------------------<  157<H>  4.4   |  19<L>  |  0.84    Ca    8.6      27 Oct 2021 00:08  Phos  5.8     10-27  Mg     1.8     10-27    TPro  6.5  /  Alb  3.5  /  TBili  0.7  /  DBili  x   /  AST  40  /  ALT  12  /  AlkPhos  87  10-27    [x] Jimenez catheter, indication: N/A  Meds: lactated ringers. 1000 milliLiter(s) IV Continuous <Continuous>    HEMATOLOGIC:  [x] VTE Prophylaxis                        11.7   17.77 )-----------( 202      ( 27 Oct 2021 00:08 )             36.0     PT/INR - ( 27 Oct 2021 00:08 )   PT: 13.8 sec;   INR: 1.16 ratio      PTT - ( 27 Oct 2021 00:08 )  PTT:29.2 sec    INFECTIOUS DISEASES  WBC Count: 17.77 K/uL (10-27 @ 00:08)  WBC Count: 9.45 K/uL (10-26 @ 16:35)    RECENT CULTURES:    Meds: influenza   Vaccine 0.5 milliLiter(s) IntraMuscular once    ENDOCRINE:  CAPILLARY BLOOD GLUCOSE  POCT Blood Glucose.: 139 mg/dL (26 Oct 2021 23:54)  POCT Blood Glucose.: 122 mg/dL (26 Oct 2021 18:05)    Meds: insulin lispro (ADMELOG) corrective regimen sliding scale   SubCutaneous every 6 hours    ACCESS DEVICES:  [x] Peripheral IV  [ ] Central Venous Line	[ ] R	[ ] L	[ ] IJ	[ ] Fem	[ ] SC	Placed:   [x] Arterial Line		[ ] R	[x] L	[ ] Fem	[x] Rad	[ ] Ax	Placed:   [x] PICC:	 prior to hospitalization				[ ] Mediport  [x] Urinary Catheter, Date Placed:   [x] Necessity of urinary, arterial, and venous catheters discussed    OTHER MEDICATIONS:  chlorhexidine 2% Cloths 1 Application(s) Topical <User Schedule>  naloxone Injectable 0.1 milliGRAM(s) IV Push every 3 minutes PRN    CODE STATUS: full code       24 HOUR EVENTS:  - One episode of nonsustained VTACH overnight  - Hypotensive, given 1L IVF bolus, started on negro gtt  - POCUS showed hyperdynamic LV, additional 1L IVF bolus  - Bloody NGT output, protonix increased to BID  - Developed hives over face, trunk, abdomen and upper thigh, likely allergic reaction to dilaudid. PCA paused.    SUBJECTIVE/ROS:  [x] A ten-point review of systems was otherwise negative except as noted.  [ ] Due to altered mental status/intubation, subjective information were not able to be obtained from the patient. History was obtained, to the extent possible, from review of the chart and collateral sources of information.    NEURO   Exam: awake, alert, oriented  Meds: acetaminophen   IVPB .. 1000 milliGRAM(s) IV Intermittent every 6 hours  HYDROmorphone PCA (1 mG/mL) 30 milliLiter(s) PCA Continuous PCA Continuous  HYDROmorphone PCA (1 mG/mL) Rescue Clinician Bolus 0.5 milliGRAM(s) IV Push every 15 minutes PRN for Pain Scale GREATER THAN 6  ondansetron Injectable 4 milliGRAM(s) IV Push every 6 hours PRN Nausea  [x] Adequacy of sedation and pain control has been assessed and adjusted    RESPIRATORY  RR: 14 (10-27-21 @ 02:00) (12 - 24)  SpO2: 98% (10-27-21 @ 02:00) (91% - 100%)  Exam: unlabored, clear to auscultation bilaterally  Mechanical Ventilation:   ABG - ( 27 Oct 2021 00:03 )  pH: 7.32  /  pCO2: 42    /  pO2: 86    / HCO3: 22    / Base Excess: -4.3  /  SaO2: 95.9      [N/A] Extubation Readiness Assessed  Meds: diphenhydrAMINE Injectable 25 milliGRAM(s) IV Push every 4 hours PRN Pruritus    CARDIOVASCULAR  HR: 100 (10-27-21 @ 02:00) (65 - 112)  BP: 86/63 (10-26-21 @ 20:00) (86/63 - 136/71)  BP(mean): 71 (10-26-21 @ 20:00) (70 - 98)  ABP: 95/46 (10-27-21 @ 02:00) (53/26 - 155/67)  ABP(mean): 61 (10-27-21 @ 02:00) (35 - 96)    Exam: regular rate and rhythm  Cardiac Rhythm: sinus  Perfusion     [x]Adequate   [ ]Inadequate  Mentation   [x]Normal       [ ]Reduced  Extremities  [x]Warm         [ ]Cool  Volume Status [ ]Hypervolemic [x]Euvolemic [ ]Hypovolemic    GI/NUTRITION  Exam: soft, nontender, nondistended, incision C/D/I, MOR serosang x2  Diet: NPO/NGT  Meds: pantoprazole  Injectable 40 milliGRAM(s) IV Push daily    GENITOURINARY  I&O's Detail    10-26 @ 07:01  -  10-27 @ 02:54  --------------------------------------------------------  IN:    IV PiggyBack: 150 mL    Lactated Ringers: 800 mL  Total IN: 950 mL    OUT:    Bulb (mL): 130 mL    Bulb (mL): 70 mL    Indwelling Catheter - Urethral (mL): 370 mL    Nasogastric/Oral tube (mL): 50 mL  Total OUT: 620 mL    Total NET: 330 mL    Weight (kg): 95.3 (10-26 @ 08:10)  10-27    137  |  104  |  13  ----------------------------<  157<H>  4.4   |  19<L>  |  0.84    Ca    8.6      27 Oct 2021 00:08  Phos  5.8     10-27  Mg     1.8     10-27    TPro  6.5  /  Alb  3.5  /  TBili  0.7  /  DBili  x   /  AST  40  /  ALT  12  /  AlkPhos  87  10-27    [x] Jimenez catheter, indication: N/A  Meds: lactated ringers. 1000 milliLiter(s) IV Continuous <Continuous>    HEMATOLOGIC:  [x] VTE Prophylaxis                        11.7   17.77 )-----------( 202      ( 27 Oct 2021 00:08 )             36.0     PT/INR - ( 27 Oct 2021 00:08 )   PT: 13.8 sec;   INR: 1.16 ratio      PTT - ( 27 Oct 2021 00:08 )  PTT:29.2 sec    INFECTIOUS DISEASES  WBC Count: 17.77 K/uL (10-27 @ 00:08)  WBC Count: 9.45 K/uL (10-26 @ 16:35)    RECENT CULTURES:    Meds: influenza   Vaccine 0.5 milliLiter(s) IntraMuscular once    ENDOCRINE:  CAPILLARY BLOOD GLUCOSE  POCT Blood Glucose.: 139 mg/dL (26 Oct 2021 23:54)  POCT Blood Glucose.: 122 mg/dL (26 Oct 2021 18:05)    Meds: insulin lispro (ADMELOG) corrective regimen sliding scale   SubCutaneous every 6 hours    SKIN:  Exam: urticarial rash over face, trunk, abdomen and upper thighs b/l    ACCESS DEVICES:  [x] Peripheral IV  [ ] Central Venous Line	[ ] R	[ ] L	[ ] IJ	[ ] Fem	[ ] SC	Placed:   [x] Arterial Line		[ ] R	[x] L	[ ] Fem	[x] Rad	[ ] Ax	Placed:   [x] PICC:	 prior to hospitalization				[ ] Mediport  [x] Urinary Catheter, Date Placed:   [x] Necessity of urinary, arterial, and venous catheters discussed    OTHER MEDICATIONS:  chlorhexidine 2% Cloths 1 Application(s) Topical <User Schedule>  naloxone Injectable 0.1 milliGRAM(s) IV Push every 3 minutes PRN    CODE STATUS: full code

## 2021-10-27 NOTE — OCCUPATIONAL THERAPY INITIAL EVALUATION ADULT - ADDITIONAL COMMENTS
Pt reports he lives alone in a condo, 2 tommy, + stall shower with a seat and grab bars, PTA pt ambulated with a rolling walker and a cane, Pt has family nearby to assist but was independent in all low level adl's

## 2021-10-28 ENCOUNTER — TRANSCRIPTION ENCOUNTER (OUTPATIENT)
Age: 81
End: 2021-10-28

## 2021-10-28 LAB
ALBUMIN SERPL ELPH-MCNC: 3.1 G/DL — LOW (ref 3.3–5)
ALBUMIN SERPL ELPH-MCNC: 3.1 G/DL — LOW (ref 3.3–5)
ALP SERPL-CCNC: 60 U/L — SIGNIFICANT CHANGE UP (ref 40–120)
ALP SERPL-CCNC: 66 U/L — SIGNIFICANT CHANGE UP (ref 40–120)
ALT FLD-CCNC: 7 U/L — LOW (ref 10–45)
ALT FLD-CCNC: 9 U/L — LOW (ref 10–45)
AMYLASE FLD-CCNC: 23 U/L — SIGNIFICANT CHANGE UP
AMYLASE FLD-CCNC: 24 U/L — SIGNIFICANT CHANGE UP
AMYLASE P1 CFR SERPL: 37 U/L — SIGNIFICANT CHANGE UP (ref 25–125)
ANION GAP SERPL CALC-SCNC: 10 MMOL/L — SIGNIFICANT CHANGE UP (ref 5–17)
ANION GAP SERPL CALC-SCNC: 11 MMOL/L — SIGNIFICANT CHANGE UP (ref 5–17)
APTT BLD: 32.3 SEC — SIGNIFICANT CHANGE UP (ref 27.5–35.5)
AST SERPL-CCNC: 34 U/L — SIGNIFICANT CHANGE UP (ref 10–40)
AST SERPL-CCNC: 37 U/L — SIGNIFICANT CHANGE UP (ref 10–40)
BILIRUB SERPL-MCNC: 0.6 MG/DL — SIGNIFICANT CHANGE UP (ref 0.2–1.2)
BILIRUB SERPL-MCNC: 0.6 MG/DL — SIGNIFICANT CHANGE UP (ref 0.2–1.2)
BUN SERPL-MCNC: 14 MG/DL — SIGNIFICANT CHANGE UP (ref 7–23)
BUN SERPL-MCNC: 16 MG/DL — SIGNIFICANT CHANGE UP (ref 7–23)
CALCIUM SERPL-MCNC: 8.2 MG/DL — LOW (ref 8.4–10.5)
CALCIUM SERPL-MCNC: 8.4 MG/DL — SIGNIFICANT CHANGE UP (ref 8.4–10.5)
CHLORIDE SERPL-SCNC: 103 MMOL/L — SIGNIFICANT CHANGE UP (ref 96–108)
CHLORIDE SERPL-SCNC: 104 MMOL/L — SIGNIFICANT CHANGE UP (ref 96–108)
CO2 SERPL-SCNC: 20 MMOL/L — LOW (ref 22–31)
CO2 SERPL-SCNC: 21 MMOL/L — LOW (ref 22–31)
CREAT SERPL-MCNC: 0.62 MG/DL — SIGNIFICANT CHANGE UP (ref 0.5–1.3)
CREAT SERPL-MCNC: 0.7 MG/DL — SIGNIFICANT CHANGE UP (ref 0.5–1.3)
GAS PNL BLDA: SIGNIFICANT CHANGE UP
GAS PNL BLDA: SIGNIFICANT CHANGE UP
GLUCOSE BLDC GLUCOMTR-MCNC: 121 MG/DL — HIGH (ref 70–99)
GLUCOSE BLDC GLUCOMTR-MCNC: 133 MG/DL — HIGH (ref 70–99)
GLUCOSE BLDC GLUCOMTR-MCNC: 150 MG/DL — HIGH (ref 70–99)
GLUCOSE BLDC GLUCOMTR-MCNC: 159 MG/DL — HIGH (ref 70–99)
GLUCOSE SERPL-MCNC: 133 MG/DL — HIGH (ref 70–99)
GLUCOSE SERPL-MCNC: 133 MG/DL — HIGH (ref 70–99)
HCT VFR BLD CALC: 26 % — LOW (ref 39–50)
HCT VFR BLD CALC: 26.3 % — LOW (ref 39–50)
HGB BLD-MCNC: 8.3 G/DL — LOW (ref 13–17)
HGB BLD-MCNC: 8.4 G/DL — LOW (ref 13–17)
INR BLD: 1.41 RATIO — HIGH (ref 0.88–1.16)
MAGNESIUM SERPL-MCNC: 2 MG/DL — SIGNIFICANT CHANGE UP (ref 1.6–2.6)
MAGNESIUM SERPL-MCNC: 2.3 MG/DL — SIGNIFICANT CHANGE UP (ref 1.6–2.6)
MCHC RBC-ENTMCNC: 31.6 GM/DL — LOW (ref 32–36)
MCHC RBC-ENTMCNC: 32.2 PG — SIGNIFICANT CHANGE UP (ref 27–34)
MCHC RBC-ENTMCNC: 32.3 GM/DL — SIGNIFICANT CHANGE UP (ref 32–36)
MCHC RBC-ENTMCNC: 32.4 PG — SIGNIFICANT CHANGE UP (ref 27–34)
MCV RBC AUTO: 100.4 FL — HIGH (ref 80–100)
MCV RBC AUTO: 101.9 FL — HIGH (ref 80–100)
NRBC # BLD: 0 /100 WBCS — SIGNIFICANT CHANGE UP (ref 0–0)
NRBC # BLD: 0 /100 WBCS — SIGNIFICANT CHANGE UP (ref 0–0)
PHOSPHATE SERPL-MCNC: 2.3 MG/DL — LOW (ref 2.5–4.5)
PHOSPHATE SERPL-MCNC: 2.4 MG/DL — LOW (ref 2.5–4.5)
PLATELET # BLD AUTO: 102 K/UL — LOW (ref 150–400)
PLATELET # BLD AUTO: 105 K/UL — LOW (ref 150–400)
POTASSIUM SERPL-MCNC: 3.6 MMOL/L — SIGNIFICANT CHANGE UP (ref 3.5–5.3)
POTASSIUM SERPL-MCNC: 4 MMOL/L — SIGNIFICANT CHANGE UP (ref 3.5–5.3)
POTASSIUM SERPL-SCNC: 3.6 MMOL/L — SIGNIFICANT CHANGE UP (ref 3.5–5.3)
POTASSIUM SERPL-SCNC: 4 MMOL/L — SIGNIFICANT CHANGE UP (ref 3.5–5.3)
PROT SERPL-MCNC: 5.7 G/DL — LOW (ref 6–8.3)
PROT SERPL-MCNC: 6 G/DL — SIGNIFICANT CHANGE UP (ref 6–8.3)
PROTHROM AB SERPL-ACNC: 16.7 SEC — HIGH (ref 10.6–13.6)
RBC # BLD: 2.58 M/UL — LOW (ref 4.2–5.8)
RBC # BLD: 2.59 M/UL — LOW (ref 4.2–5.8)
RBC # FLD: 14.4 % — SIGNIFICANT CHANGE UP (ref 10.3–14.5)
RBC # FLD: 14.5 % — SIGNIFICANT CHANGE UP (ref 10.3–14.5)
SODIUM SERPL-SCNC: 134 MMOL/L — LOW (ref 135–145)
SODIUM SERPL-SCNC: 135 MMOL/L — SIGNIFICANT CHANGE UP (ref 135–145)
TROPONIN T, HIGH SENSITIVITY RESULT: 104 NG/L — HIGH (ref 0–51)
TROPONIN T, HIGH SENSITIVITY RESULT: 96 NG/L — HIGH (ref 0–51)
WBC # BLD: 4.79 K/UL — SIGNIFICANT CHANGE UP (ref 3.8–10.5)
WBC # BLD: 5.3 K/UL — SIGNIFICANT CHANGE UP (ref 3.8–10.5)
WBC # FLD AUTO: 4.79 K/UL — SIGNIFICANT CHANGE UP (ref 3.8–10.5)
WBC # FLD AUTO: 5.3 K/UL — SIGNIFICANT CHANGE UP (ref 3.8–10.5)

## 2021-10-28 PROCEDURE — 99233 SBSQ HOSP IP/OBS HIGH 50: CPT

## 2021-10-28 PROCEDURE — 73620 X-RAY EXAM OF FOOT: CPT | Mod: 26,RT

## 2021-10-28 PROCEDURE — 93010 ELECTROCARDIOGRAM REPORT: CPT | Mod: 76

## 2021-10-28 PROCEDURE — 99291 CRITICAL CARE FIRST HOUR: CPT

## 2021-10-28 RX ORDER — POTASSIUM CHLORIDE 20 MEQ
20 PACKET (EA) ORAL
Refills: 0 | Status: COMPLETED | OUTPATIENT
Start: 2021-10-28 | End: 2021-10-28

## 2021-10-28 RX ORDER — METOPROLOL TARTRATE 50 MG
5 TABLET ORAL EVERY 6 HOURS
Refills: 0 | Status: DISCONTINUED | OUTPATIENT
Start: 2021-10-28 | End: 2021-10-28

## 2021-10-28 RX ORDER — FENTANYL CITRATE 50 UG/ML
30 INJECTION INTRAVENOUS
Refills: 0 | Status: DISCONTINUED | OUTPATIENT
Start: 2021-10-28 | End: 2021-10-29

## 2021-10-28 RX ORDER — SODIUM CHLORIDE 9 MG/ML
1000 INJECTION, SOLUTION INTRAVENOUS
Refills: 0 | Status: DISCONTINUED | OUTPATIENT
Start: 2021-10-28 | End: 2021-10-28

## 2021-10-28 RX ORDER — PHYTONADIONE (VIT K1) 5 MG
10 TABLET ORAL DAILY
Refills: 0 | Status: COMPLETED | OUTPATIENT
Start: 2021-10-28 | End: 2021-10-30

## 2021-10-28 RX ORDER — METOPROLOL TARTRATE 50 MG
5 TABLET ORAL EVERY 4 HOURS
Refills: 0 | Status: DISCONTINUED | OUTPATIENT
Start: 2021-10-28 | End: 2021-10-30

## 2021-10-28 RX ORDER — MAGNESIUM SULFATE 500 MG/ML
2 VIAL (ML) INJECTION ONCE
Refills: 0 | Status: COMPLETED | OUTPATIENT
Start: 2021-10-28 | End: 2021-10-28

## 2021-10-28 RX ORDER — SODIUM CHLORIDE 9 MG/ML
1000 INJECTION, SOLUTION INTRAVENOUS
Refills: 0 | Status: DISCONTINUED | OUTPATIENT
Start: 2021-10-28 | End: 2021-10-29

## 2021-10-28 RX ORDER — FENTANYL CITRATE 50 UG/ML
10 INJECTION INTRAVENOUS
Refills: 0 | Status: DISCONTINUED | OUTPATIENT
Start: 2021-10-28 | End: 2021-11-02

## 2021-10-28 RX ORDER — INSULIN LISPRO 100/ML
VIAL (ML) SUBCUTANEOUS EVERY 4 HOURS
Refills: 0 | Status: DISCONTINUED | OUTPATIENT
Start: 2021-10-28 | End: 2021-10-29

## 2021-10-28 RX ORDER — NALOXONE HYDROCHLORIDE 4 MG/.1ML
0.1 SPRAY NASAL
Refills: 0 | Status: DISCONTINUED | OUTPATIENT
Start: 2021-10-28 | End: 2021-11-02

## 2021-10-28 RX ORDER — ACETAMINOPHEN 500 MG
1000 TABLET ORAL EVERY 6 HOURS
Refills: 0 | Status: COMPLETED | OUTPATIENT
Start: 2021-10-28 | End: 2021-10-29

## 2021-10-28 RX ORDER — ONDANSETRON 8 MG/1
4 TABLET, FILM COATED ORAL EVERY 6 HOURS
Refills: 0 | Status: DISCONTINUED | OUTPATIENT
Start: 2021-10-28 | End: 2021-11-02

## 2021-10-28 RX ADMIN — Medication 5 MILLIGRAM(S): at 18:07

## 2021-10-28 RX ADMIN — HEPARIN SODIUM 5000 UNIT(S): 5000 INJECTION INTRAVENOUS; SUBCUTANEOUS at 06:02

## 2021-10-28 RX ADMIN — HEPARIN SODIUM 5000 UNIT(S): 5000 INJECTION INTRAVENOUS; SUBCUTANEOUS at 13:36

## 2021-10-28 RX ADMIN — PANTOPRAZOLE SODIUM 40 MILLIGRAM(S): 20 TABLET, DELAYED RELEASE ORAL at 06:02

## 2021-10-28 RX ADMIN — FENTANYL CITRATE 30 MILLILITER(S): 50 INJECTION INTRAVENOUS at 10:01

## 2021-10-28 RX ADMIN — Medication 62.5 MILLIMOLE(S): at 18:07

## 2021-10-28 RX ADMIN — FENTANYL CITRATE 25 MICROGRAM(S): 50 INJECTION INTRAVENOUS at 06:03

## 2021-10-28 RX ADMIN — Medication 5 MILLIGRAM(S): at 12:26

## 2021-10-28 RX ADMIN — Medication 250 MILLIMOLE(S): at 07:46

## 2021-10-28 RX ADMIN — FENTANYL CITRATE 25 MICROGRAM(S): 50 INJECTION INTRAVENOUS at 01:42

## 2021-10-28 RX ADMIN — CHLORHEXIDINE GLUCONATE 1 APPLICATION(S): 213 SOLUTION TOPICAL at 06:02

## 2021-10-28 RX ADMIN — FENTANYL CITRATE 30 MILLILITER(S): 50 INJECTION INTRAVENOUS at 19:08

## 2021-10-28 RX ADMIN — Medication 5 MILLIGRAM(S): at 21:44

## 2021-10-28 RX ADMIN — Medication 400 MILLIGRAM(S): at 18:07

## 2021-10-28 RX ADMIN — Medication 100 MILLIEQUIVALENT(S): at 20:32

## 2021-10-28 RX ADMIN — Medication 1000 MILLIGRAM(S): at 12:40

## 2021-10-28 RX ADMIN — Medication 1000 MILLIGRAM(S): at 18:22

## 2021-10-28 RX ADMIN — Medication 102 MILLIGRAM(S): at 08:29

## 2021-10-28 RX ADMIN — Medication 100 MILLIEQUIVALENT(S): at 18:37

## 2021-10-28 RX ADMIN — FENTANYL CITRATE 25 MICROGRAM(S): 50 INJECTION INTRAVENOUS at 06:18

## 2021-10-28 RX ADMIN — Medication 400 MILLIGRAM(S): at 12:25

## 2021-10-28 RX ADMIN — HEPARIN SODIUM 5000 UNIT(S): 5000 INJECTION INTRAVENOUS; SUBCUTANEOUS at 21:44

## 2021-10-28 RX ADMIN — PANTOPRAZOLE SODIUM 40 MILLIGRAM(S): 20 TABLET, DELAYED RELEASE ORAL at 18:07

## 2021-10-28 RX ADMIN — Medication 50 GRAM(S): at 12:26

## 2021-10-28 RX ADMIN — Medication 250 MILLIMOLE(S): at 01:42

## 2021-10-28 RX ADMIN — SODIUM CHLORIDE 100 MILLILITER(S): 9 INJECTION, SOLUTION INTRAVENOUS at 18:08

## 2021-10-28 RX ADMIN — Medication 2: at 13:41

## 2021-10-28 NOTE — DISCHARGE NOTE PROVIDER - NSDCMRMEDTOKEN_GEN_ALL_CORE_FT
ALPRAZolam 0.25 mg oral tablet: 1 tab(s) orally 3 times a day, As Needed  cholecalciferol 400 intl units (10 mcg) oral tablet: 1 tab(s) orally once a day  Coenzyme Q10 200 mg oral capsule: 1 tab(s) orally once a day  dorzolamide-timolol 2.23%-0.68% ophthalmic solution: 1 drop(s) to each affected eye 2 times a day  DULoxetine 60 mg oral delayed release capsule: 1 cap(s) orally once a day  gabapentin 300 mg oral tablet: 1 tab(s) orally 2 times a day  Keflex 500 mg oral capsule: 1 cap(s) orally every 12 hours  ketorolac 0.5% ophthalmic solution: 1 drop(s) to each affected eye once a day  latanoprost 0.005% ophthalmic solution: 1 drop(s) to each affected eye once a day (in the evening)  melatonin 3 mg oral tablet: 2 tab(s) orally once a day (at bedtime)  Multiple Vitamins oral tablet: 1 tab(s) orally once a day  oxyCODONE 10 mg oral tablet: 1 tab(s) orally every 6 hours, As Needed  pancrelipase 36,000 units-114,000 units-180,000 units oral delayed release capsule: 2 cap(s) orally 3 times a day  sacubitril-valsartan 24 mg-26 mg oral tablet: 1 tab(s) orally 2 times a day  Senna 8.6 mg oral tablet: 2 tab(s) orally once a day (at bedtime)  simethicone 80 mg oral tablet: 2 tab(s) orally once a day (at bedtime), As Needed  sotalol 80 mg oral tablet: 0.5 tab(s) orally 2 times a day  tamsulosin 0.4 mg oral capsule: 1 cap(s) orally once a day  Vitamin B6 100 mg oral tablet: 1 tab(s) orally once a day   - 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   - downtrending, ctm ALPRAZolam 0.25 mg oral tablet: 1 tab(s) orally 3 times a day, As Needed  ampicillin 2 g injection: 2 grams in sodium chloride 0.9% 100 mL, IV intermittent, every 4 hours, infuse over 30 minutes   Stop 12/18/21   cefTRIAXone 2 g injection: 2000 mg in dextrose 5% 50 mL, IV intermittent, every 12 hours, infuse over 30 minutes   Stop 12/18/21    cholecalciferol 400 intl units (10 mcg) oral tablet: 1 tab(s) orally once a day  Coenzyme Q10 200 mg oral capsule: 1 tab(s) orally once a day  dorzolamide-timolol 2.23%-0.68% ophthalmic solution: 1 drop(s) to each affected eye 2 times a day  DULoxetine 60 mg oral delayed release capsule: 1 cap(s) orally once a day  gabapentin 300 mg oral tablet: 1 tab(s) orally 2 times a day  Keflex 500 mg oral capsule: 1 cap(s) orally every 12 hours  ketorolac 0.5% ophthalmic solution: 1 drop(s) to each affected eye once a day  latanoprost 0.005% ophthalmic solution: 1 drop(s) to each affected eye once a day (in the evening)  melatonin 3 mg oral tablet: 2 tab(s) orally once a day (at bedtime)  Multiple Vitamins oral tablet: 1 tab(s) orally once a day  oxyCODONE 10 mg oral tablet: 1 tab(s) orally every 6 hours, As Needed  pancrelipase 36,000 units-114,000 units-180,000 units oral delayed release capsule: 2 cap(s) orally 3 times a day  sacubitril-valsartan 24 mg-26 mg oral tablet: 1 tab(s) orally 2 times a day  Senna 8.6 mg oral tablet: 2 tab(s) orally once a day (at bedtime)  simethicone 80 mg oral tablet: 2 tab(s) orally once a day (at bedtime), As Needed  sotalol 80 mg oral tablet: 0.5 tab(s) orally 2 times a day  tamsulosin 0.4 mg oral capsule: 1 cap(s) orally once a day  Vitamin B6 100 mg oral tablet: 1 tab(s) orally once a day   acetaminophen 325 mg oral tablet: 2 tab(s) orally every 6 hours  ALPRAZolam 0.25 mg oral tablet: 1 tab(s) orally once a day (at bedtime), As needed, agitation  amiodarone 200 mg oral tablet: 1 tab(s) orally every 24 hours starting 11/17  amiodarone 200 mg oral tablet: 1 tab(s) orally every 12 hours (to complete 7 days) which will be till 11/16 then continue daily dosing  ampicillin 2 g injection: 2 grams in sodium chloride 0.9% 100 mL, IV intermittent, every 4 hours, infuse over 30 minutes   Stop 12/18/21   cefTRIAXone 2 g injection: 2000 mg in dextrose 5% 50 mL, IV intermittent, every 12 hours, infuse over 30 minutes   Stop 12/18/21    chlorhexidine 4% topical liquid: Apply topically to affected area once a day  cholecalciferol 400 intl units (10 mcg) oral tablet: 1 tab(s) orally once a day  dorzolamide-timolol 2%-0.5% preservative-free ophthalmic solution: 1 drop(s) to each affected eye 2 times a day  furosemide 40 mg oral tablet: 1 tab(s) orally once a day  ketorolac 0.5% ophthalmic solution: 1 drop(s) to each affected eye once a day  latanoprost 0.005% ophthalmic solution: 1 drop(s) to each affected eye once a day (at bedtime)  lidocaine 4% topical film: Apply topically to affected area once a day  melatonin 3 mg oral tablet: 2 tab(s) orally once a day (at bedtime)  metoprolol tartrate 25 mg oral tablet: 1 tab(s) orally every 12 hours  Multiple Vitamins oral tablet: 1 tab(s) orally once a day  oxyCODONE 10 mg oral tablet: 1 tab(s) orally every 4 hours, As needed, Severe Pain (7 - 10)  oxyCODONE 5 mg oral tablet: 1 tab(s) orally every 4 hours, As needed, Moderate Pain (4 - 6)  pancrelipase 36,000 units-114,000 units-180,000 units oral delayed release capsule: 2 cap(s) orally 3 times a day (with meals)  pantoprazole 40 mg oral delayed release tablet: 1 tab(s) orally once a day (before a meal)  Reglan 10 mg oral tablet: 1 tab(s) orally 3 times a day (before meals)  senna oral tablet: 1 tab(s) orally once a day  sucralfate 1 g oral tablet: 1 tab(s) orally 2 times a day  tamsulosin 0.4 mg oral capsule: 1 cap(s) orally once a day  Vitamin B6 100 mg oral tablet: 1 tab(s) orally once a day   acetaminophen 325 mg oral tablet: 2 tab(s) orally every 6 hours  ALPRAZolam 0.25 mg oral tablet: 1 tab(s) orally once a day (at bedtime), As needed, agitation  amiodarone 200 mg oral tablet: 1 tab(s) orally every 24 hours starting 11/17  amiodarone 200 mg oral tablet: 1 tab(s) orally every 12 hours (to complete 7 days) which will be till 11/16 then continue daily dosing  ampicillin 2 g injection: 2 grams in sodium chloride 0.9% 100 mL, IV intermittent, every 4 hours, infuse over 30 minutes   Stop 12/18/21   cefTRIAXone 2 g injection: 2000 mg in dextrose 5% 50 mL, IV intermittent, every 12 hours, infuse over 30 minutes   Stop 12/18/21    chlorhexidine 4% topical liquid: Apply topically to affected area once a day  cholecalciferol 400 intl units (10 mcg) oral tablet: 1 tab(s) orally once a day  dorzolamide-timolol 2%-0.5% preservative-free ophthalmic solution: 1 drop(s) to each affected eye 2 times a day  furosemide 40 mg oral tablet: 1 tab(s) orally once a day  ketorolac 0.5% ophthalmic solution: 1 drop(s) to each affected eye once a day  latanoprost 0.005% ophthalmic solution: 1 drop(s) to each affected eye once a day (at bedtime)  lidocaine 4% topical film: Apply topically to affected area once a day  melatonin 3 mg oral tablet: 2 tab(s) orally once a day (at bedtime)  metoprolol tartrate 25 mg oral tablet: 1 tab(s) orally every 12 hours  Multiple Vitamins oral tablet: 1 tab(s) orally once a day  oxyCODONE 10 mg oral tablet: 1 tab(s) orally every 4 hours, As needed, Severe Pain (7 - 10)  oxyCODONE 5 mg oral tablet: 1 tab(s) orally every 4 hours, As needed, Moderate Pain (4 - 6)  pancrelipase 36,000 units-114,000 units-180,000 units oral delayed release capsule: 2 cap(s) orally 3 times a day (with meals)  pantoprazole 40 mg oral delayed release tablet: 1 tab(s) orally once a day (before a meal)  Reglan 10 mg oral tablet: 1 tab(s) orally 3 times a day (before meals)  senna oral tablet: 1 tab(s) orally once a day  sucralfate 1 g oral tablet: 1 tab(s) orally 2 times a day  tamsulosin 0.4 mg oral capsule: 1 cap(s) orally once a day  Vitamin B6 100 mg oral tablet: 1 tab(s) orally once a day  Xarelto 20 mg oral tablet: 1 tab(s) orally once a day (in the evening)

## 2021-10-28 NOTE — DISCHARGE NOTE PROVIDER - NSDCFUADDAPPT_GEN_ALL_CORE_FT
- Monitor LFTs/TFTs/PFTs while on Amiodarone therapy. Patient will need outpatient ophthalmologic evaluation yearly while on Amiodarone

## 2021-10-28 NOTE — PROGRESS NOTE ADULT - SUBJECTIVE AND OBJECTIVE BOX
24h Events:  No acute events overnight.    Subjective:   Patient seen at bedside this AM. Denies n/v. Tolerating Diet     Objective:  Vital Signs  T(C): 36.6 (10-28 @ 03:00), Max: 36.8 (10-27 @ 18:00)  HR: 101 (10-28 @ 04:00) (91 - 111)  BP: 146/67 (10-27 @ 19:00) (146/67 - 146/67)  RR: 24 (10-28 @ 04:00) (10 - 44)  SpO2: 100% (10-28 @ 04:00) (93% - 100%)  10-26-21 @ 07:01  -  10-27-21 @ 07:00  --------------------------------------------------------  IN:  Total IN: 0 mL    OUT:    Bulb (mL): 170 mL    Bulb (mL): 120 mL    Indwelling Catheter - Urethral (mL): 490 mL    Nasogastric/Oral tube (mL): 350 mL  Total OUT: 1130 mL    Total NET: -1130 mL      10-27-21 @ 07:01  -  10-28-21 @ 04:58  --------------------------------------------------------  IN:  Total IN: 0 mL    OUT:    Bulb (mL): 45 mL    Bulb (mL): 20 mL    Indwelling Catheter - Urethral (mL): 1155 mL  Total OUT: 1220 mL    Total NET: -1220 mL          Physical Exam:  GEN:   RESP:   ABD:   EXTR:   NEURO:     Labs:                        8.4    5.30  )-----------( 105      ( 28 Oct 2021 04:38 )             26.0   10-27    136  |  106  |  17  ----------------------------<  132<H>  4.0   |  19<L>  |  0.82    Ca    8.1<L>      27 Oct 2021 21:05  Phos  2.5     10-27  Mg     2.2     10-27    TPro  5.6<L>  /  Alb  3.2<L>  /  TBili  0.7  /  DBili  x   /  AST  34  /  ALT  11  /  AlkPhos  61  10-27    CAPILLARY BLOOD GLUCOSE      POCT Blood Glucose.: 122 mg/dL (27 Oct 2021 20:58)  POCT Blood Glucose.: 117 mg/dL (27 Oct 2021 17:17)  POCT Blood Glucose.: 132 mg/dL (27 Oct 2021 06:11)      Imaging:     24h Events:  - Lactate uptrended to 8. Received total of 3.5L crystalloid and 750 colloid (5% albumin). Started on vaso during the day and weaned off overnight. Discontinued epinephrine gtt. Lactate cleared and UOP improved.  - Rash resolved. Allergy and immunology consulted. No acute interventions. F/u outpatient for allergy testing.      Subjective:   Patient seen at bedside this AM. Denies n/v. Patient denies passing flatus/bm at this time. He reports still having pain, despite fentanyl.     Objective:  Vital Signs  T(C): 36.6 (10-28 @ 03:00), Max: 36.8 (10-27 @ 18:00)  HR: 101 (10-28 @ 04:00) (91 - 111)  BP: 146/67 (10-27 @ 19:00) (146/67 - 146/67)  RR: 24 (10-28 @ 04:00) (10 - 44)  SpO2: 100% (10-28 @ 04:00) (93% - 100%)  10-26-21 @ 07:01  -  10-27-21 @ 07:00  --------------------------------------------------------  IN:  Total IN: 0 mL    OUT:    Bulb (mL): 170 mL    Bulb (mL): 120 mL    Indwelling Catheter - Urethral (mL): 490 mL    Nasogastric/Oral tube (mL): 350 mL  Total OUT: 1130 mL  Total NET: -1130 mL    10-27-21 @ 07:01  -  10-28-21 @ 04:58  --------------------------------------------------------  IN:  Total IN: 0 mL    OUT:    Bulb (mL): 45 mL    Bulb (mL): 20 mL    Indwelling Catheter - Urethral (mL): 1155 mL  Total OUT: 1220 mL  Total NET: -1220 mL      Labs:                        8.4    5.30  )-----------( 105      ( 28 Oct 2021 04:38 )             26.0   10-27    136  |  106  |  17  ----------------------------<  132<H>  4.0   |  19<L>  |  0.82    Ca    8.1<L>      27 Oct 2021 21:05  Phos  2.5     10-27  Mg     2.2     10-27    TPro  5.6<L>  /  Alb  3.2<L>  /  TBili  0.7  /  DBili  x   /  AST  34  /  ALT  11  /  AlkPhos  61  10-27    CAPILLARY BLOOD GLUCOSE      POCT Blood Glucose.: 122 mg/dL (27 Oct 2021 20:58)  POCT Blood Glucose.: 117 mg/dL (27 Oct 2021 17:17)  POCT Blood Glucose.: 132 mg/dL (27 Oct 2021 06:11)      Imaging:    Physical exam:  Gen: resting in bed, getting changed   Resp: Unlabored breathing   Abd: soft, appropriatety tender, nondistended, incisional vac in place to suction, MOR serosang x2

## 2021-10-28 NOTE — DISCHARGE NOTE PROVIDER - CARE PROVIDER_API CALL
Fady Mercer)  Surgery  [address] [facility], [place] [address]  Phone: [phone]  Fax: [phone])-  Follow Up Time: 1 week    Quan Herrera)  Cardiovascular Disease; Internal Medicine  [address], [place] [id number]  Phone: [phone]  Fax: [phone]  Follow Up Time: 1 week    Sanjay Andres)  Infectious Disease; Internal Medicine  [address] [place] [address]  Phone: [phone]  Fax: [phone]  Follow Up Time: 1 month    Brian López (DPRYNE)  [facility] [address] [place] [address]  Phone: [phone]  Fax: [phone]  Follow Up Time: 1 week   Fady Mercer)  Surgery  [address] [facility], [place] [address]  Phone: [phone]  Fax: [phone])-  Follow Up Time: 1 week    Sanjay Andres)  Infectious Disease; Internal Medicine  [address] [place] [address]  Phone: [phone]  Fax: [phone]  Follow Up Time: 1 month    Brian López (DPM)  [facility] [address] [place] [address]  Phone: [phone]  Fax: [phone]  Follow Up Time: 1 week    Sourav Gu)  Cardio [facility]  [address] [place] [address]  Phone: [phone]  Fax: [phone]  Follow Up Time: 1 week

## 2021-10-28 NOTE — PROGRESS NOTE ADULT - SUBJECTIVE AND OBJECTIVE BOX
[facility] -- Milo Thomas, Adolfo, Binta, Arian Gu Savella  Office # [phone]      Follow Up:  ann REVELES    Subjective/Observations: Patient seen and examined. Events noted. Resting comfortably in bed. No complaints of chest pain, dyspnea, or palpitations reported. No signs of orthopnea or PND. in sig pain.       REVIEW OF SYSTEMS: All other review of systems is negative unless indicated above    PAST MEDICAL & SURGICAL HISTORY:  HTN (hypertension)    HLD (hyperlipidemia)    GERD (gastroesophageal reflux disease)    Cardiomyopathy    MSSA bacteremia  9/2021    Acute osteomyelitis    Pulmonary embolism    Pancreas cancer  chemo    [place] (hard of hearing)  B/L hearing aids    History of total hip replacement  left X 1, 2 revisions    History of laminectomy  X3    ICD (implantable cardiac defibrillator) in place  implanted 2005, replaced 10/4/2021 [employer] Subcutaneous ICD    Benign testicular tumor  radiation    Status post amputation of toe of right foot  8/2021    Status post fracture of femur  2017 left with hardware    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 1000 milliGRAM(s) IV Intermittent every 6 hours  chlorhexidine 2% Cloths 1 Application(s) Topical <User Schedule>  dextrose 5% + sodium chloride 0.45%. 1000 milliLiter(s) (125 mL/Hr) IV Continuous <Continuous>  fentaNYL PCA (50 MICROgram(s)/mL) 30 milliLiter(s) PCA Continuous PCA Continuous  heparin   Injectable 5000 Unit(s) SubCutaneous every 8 hours  influenza   Vaccine 0.5 milliLiter(s) IntraMuscular once  insulin lispro (ADMELOG) corrective regimen sliding scale   SubCutaneous every 4 hours  magnesium sulfate  IVPB 2 Gram(s) IV Intermittent once  metoprolol tartrate Injectable 5 milliGRAM(s) IV Push every 6 hours  pantoprazole  Injectable 40 milliGRAM(s) IV Push every 12 hours  phytonadione  IVPB 10 milliGRAM(s) IV Intermittent daily    MEDICATIONS  (PRN):  diphenhydrAMINE Injectable 25 milliGRAM(s) IV Push every 4 hours PRN Pruritus  fentaNYL  PCA (50 MICROgram(s)/mL) Rescue Clinician Bolus 10 MICROGram(s) IV Push every 15 minutes PRN for Pain Scale GREATER THAN 6  naloxone Injectable 0.1 milliGRAM(s) IV Push every 3 minutes PRN For ANY of the following changes in patient status:  A. RR LESS THAN 10 breaths per minute, B. Oxygen saturation LESS THAN 90%, C. Sedation score of 6  ondansetron Injectable 4 milliGRAM(s) IV Push every 6 hours PRN Nausea      Allergies    Dilaudid (Anaphylaxis; Hives)    Intolerances            Vital Signs Last 24 Hrs  T(C): 36.9 (28 Oct 2021 11:00), Max: 36.9 (28 Oct 2021 11:00)  T(F): 98.4 (28 Oct 2021 11:00), Max: 98.4 (28 Oct 2021 11:00)  HR: 107 (28 Oct 2021 11:00) (97 - 111)  BP: 141/60 (28 Oct 2021 07:30) (141/60 - 146/67)  BP(mean): 87 (28 Oct 2021 07:30) (87 - 97)  RR: 24 (28 Oct 2021 11:00) (19 - 44)  SpO2: 100% (28 Oct 2021 11:00) (94% - 100%)    I&O's Summary    27 Oct 2021 07:01  -  28 Oct 2021 07:00  --------------------------------------------------------  IN: 5674.4 mL / OUT: 1420 mL / NET: 4254.4 mL    28 Oct 2021 07:01  -  28 Oct 2021 12:11  --------------------------------------------------------  IN: 983.2 mL / OUT: 375 mL / NET: 608.2 mL          PHYSICAL EXAM:  TELE: ST with episode of NSVT (4-6 beats)  Constitutional: NAD, awake and alert, well-developed  HEENT: Moist Mucous Membranes, Anicteric  Pulmonary: Decreased breath sounds b/l. No rales, crackles or wheeze appreciated.   Cardiovascular: Regular, S1 and S2, No murmurs, rubs, gallops or clicks  Gastrointestinal: Bowel Sounds present, soft, nontender.   Lymph: No peripheral edema. No lymphadenopathy.  Skin: No visible rashes or ulcers.  Psych:  Mood & affect appropriate for situation    LABS: All Labs Reviewed:                        8.4    5.30  )-----------( 105      ( 28 Oct 2021 04:38 )             26.0                         8.2    4.18  )-----------( 111      ( 27 Oct 2021 21:05 )             26.5                         8.7    5.12  )-----------( 119      ( 27 Oct 2021 15:26 )             27.4     28 Oct 2021 04:38    135    |  104    |  16     ----------------------------<  133    4.0     |  20     |  0.70   27 Oct 2021 21:05    136    |  106    |  17     ----------------------------<  132    4.0     |  19     |  0.82   27 Oct 2021 15:26    136    |  103    |  17     ----------------------------<  130    4.0     |  17     |  0.94     Ca    8.2        28 Oct 2021 04:38  Ca    8.1        27 Oct 2021 21:05  Ca    8.2        27 Oct 2021 15:26  Phos  2.4       28 Oct 2021 04:38  Phos  2.5       27 Oct 2021 21:05  Phos  3.4       27 Oct 2021 15:26  Mg     2.0       28 Oct 2021 04:38  Mg     2.2       27 Oct 2021 21:05  Mg     1.9       27 Oct 2021 15:26    TPro  5.7    /  Alb  3.1    /  TBili  0.6    /  DBili  x      /  AST  37     /  ALT  7      /  AlkPhos  60     28 Oct 2021 04:38  TPro  5.6    /  Alb  3.2    /  TBili  0.7    /  DBili  x      /  AST  34     /  ALT  11     /  AlkPhos  61     27 Oct 2021 21:05  TPro  6.0    /  Alb  3.7    /  TBili  0.7    /  DBili  x      /  AST  32     /  ALT  12     /  AlkPhos  61     27 Oct 2021 15:26    PT/INR - ( 28 Oct 2021 04:38 )   PT: 16.7 sec;   INR: 1.41 ratio         PTT - ( 28 Oct 2021 04:38 )  PTT:32.3 sec         < from: Transthoracic Echocardiogram (10.27.21 @ 07:59) >    Patient name: JUANIS DE SANTIAGO  YOB: 1940   Age: 80 (M)   MR#: [id number]  Study Date: 10/27/2021  Location: [address] [place], [place] [id number]: Karley Zaragoza SURENDRA  Study quality: Technically difficult  Referring Physician: Fady Mercer MD  Blood Pressure: 112/45 mmHg  Height: 188 cm  Weight: 95 kg  BSA: 2.2 m2  ------------------------------------------------------------------------  PROCEDURE: Transthoracic echocardiogram with 2-D, M-Mode  and complete spectral and color flow Doppler.  INDICATION: Cardiogenic shock (R57.0)  ------------------------------------------------------------------------  Dimensions:    Normal Values:  LA:     4.4    2.0 - 4.0 cm  Ao:     2.7    2.0 - 3.8 cm  SEPTUM: 0.7    0.6 - 1.2 cm  PWT:    0.9    0.6 -1.1 cm  LVIDd:  5.8    3.0 - 5.6 cm  LVIDs:  4.7    1.8 - 4.0 cm  Derived variables:  LVMI: 79 g/m2  RWT: 0.31  Fractional short: 19 %  EF (Visual Estimate): 30-35 %  Doppler Peak Velocity (m/sec): AoV=1.7  ------------------------------------------------------------------------  Observations:  Mitral Valve: Mitral annular calcification and calcified  mitral leaflets with decreased diastolic opening. Mean  transmitral valve gradient equals 7 mm Hg, consistent with  moderate mitral stenosis. HR 80s  Aortic Valve/Aorta: Transcatheter aortic valve replacement.  Peak transaortic valve gradient equals 12 mm Hg, mean  transaortic valve gradient equals 7 mm Hg, which is  probably normal in the presence of a transcatheter aortic  valve replacement. No aortic valve regurgitation seen.  Aortic Root: 2.7 cm.  Left Atrium: Left atrium not well visualized.  Left Ventricle: Endocardial visualization enhanced with  intravenous injection of Ultrasonic Enhancing Agent  (Definity). Severe global left ventricular systolic  dysfunction. Mild left ventricular enlargement. Unable to  evaluate diastology.  Right Heart: Moderate right atrial enlargement.  A device  wire is noted in the right heart. The right ventricle is  not well visualized; grossly normal right ventricular  systolic function. Normal tricuspid valve. Minimal  tricuspid regurgitation. Pulmonic valve not well  visualized.  Pericardium/Pleura: Normal pericardium with no pericardial  effusion.  Hemodynamic: Estimated right atrial pressure is 8 mm Hg.  Estimated right ventricular systolic pressure equals 42 mm  Hg, assuming right atrial pressure equals 8 mm Hg,  consistent with mild pulmonary hypertension.  ------------------------------------------------------------------------  Conclusions:  1. Mitral annular calcification and calcified mitral  leaflets with decreased diastolic opening. Mean transmitral  valve gradient equals 7 mm Hg, consistent with moderate  mitral stenosis. HR 80s  2. Transcatheter aortic valve replacement. Peaktransaortic  valve gradient equals 12 mm Hg, mean transaortic valve  gradient equals 7 mm Hg, which is probably normal in the  presence of a transcatheter aortic valve replacement. No  aortic valve regurgitation seen.  3. Endocardial visualization enhanced with intravenous  injection of Ultrasonic Enhancing Agent (Definity). Severe  global left ventricular systolic dysfunction.  4. The right ventricle is not well visualized; grossly  normal right ventricular systolic function.  ------------------------------------------------------------------------  Confirmed on  10/27/2021 - 20:25:09 by KRISTI Lares  -----------------------------------------------------------------------    < end of copied text >

## 2021-10-28 NOTE — PROGRESS NOTE ADULT - ASSESSMENT
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 Patient now s/p Whipple procedure.    Pain: Fentanyl + IV PCA ordered today  Diet: NGT out today, remains NPO, Maintenance fluids  Dvt PPX; heparin  Vitamin K 10mgIV x3 days to be given    [phone]  Red Surgery

## 2021-10-28 NOTE — DISCHARGE NOTE PROVIDER - CARE PROVIDERS DIRECT ADDRESSES
[email or web address] ,[email or web address]

## 2021-10-28 NOTE — DISCHARGE NOTE PROVIDER - NSDCCPCAREPLAN_GEN_ALL_CORE_FT
PRINCIPAL DISCHARGE DIAGNOSIS  Diagnosis: Malignant neoplasm of pancreas  Assessment and Plan of Treatment: WOUND CARE: Wound VAC changes MWF with white foam  BATHING: Please do not submerge wound underwater. You may shower and/or sponge bathe. Please pat wound dry after showering.    ACTIVITY: No heavy lifting anything more than 10-15lbs or straining. Otherwise, you may return to your usual level of physical activity. .  NOTIFY YOUR SURGEON IF: You have any excessive bleeding or pus draining from your wound, any fever (over 100.4 F) or chills, persistent nausea/vomiting with inability to tolerate food or liquids, persistent diarrhea, or if your pain is not controlled on your discharge pain medications.  FOLLOW-UP:  1. Please call to make a follow-up appointment in 1-2 weeks with Dr. Mercer upon discharge from the hospital - Please call immediately upon discharge to schedule the appointment  2. Please follow up with your primary care physician in one week regarding your hospitalization.      SECONDARY DISCHARGE DIAGNOSES  Diagnosis: VRE bacteremia  Assessment and Plan of Treatment: --Recommend Ampicillin 2g IV Q4H and Ceftriaxone 2g IV Q12H for 6 weeks from cleared BCx (11/6 ---> 12/17)  --Recommend CBC with Diff and CMP qWeekly while on antimicrobials. Please have results faxed to [phone]  --Patient should follow up with Dr. Andres in the Infectious Diseases Office at [address] [place] [address] in 3-4 weeks time. Office contact number is [phone]    Diagnosis: Sustained ventricular tachycardia  Assessment and Plan of Treatment: Pleaes continue medications a prescribed- follow up with cardiologist Dr. Herrera in 1-2 weeks- call for appointment

## 2021-10-28 NOTE — DISCHARGE NOTE PROVIDER - NSDCFUADDINST_GEN_ALL_CORE_FT
Podiatry Discharge Instructions:  Follow up: Please follow up with Dr. López within 1 week of discharge from the hospital, please call [phone] for appointment and discuss that you recently were seen in the hospital.  Wound Care: Please apply 1/2 inch plain gauze packing daily, followed by 4x4 deidre and milady.   Weight bearing: Please weight bear as tolerated in a surgical shoe to R heel.  Antibiotics: Please continue as instructed. Podiatry Discharge Instructions:  Follow up: Please follow up with Dr. López within 1 week of discharge from the hospital, please call [phone] for appointment and discuss that you recently were seen in the hospital.  Wound Care: Please apply 1/2 inch plain gauze packing daily, followed by 4x4 deidre and milady.   Weight bearing: Please weight bear as tolerated in a surgical shoe to R heel.  Antibiotics: Please continue as instructed.    Continue amiodarone load  - this was started after Sotalol washout (400mg BID x1 weeks, THEN 200mg BID x1 week, THEN maintenance dose of 200mg once daily). Patient currently on day 6/7 of 200mg BID- To start daily dosing on 11/17.  Continue to hold Entresto as per cardiology

## 2021-10-28 NOTE — PROGRESS NOTE ADULT - ASSESSMENT
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 Patient now s/p Whipple procedure.    PLAN:  NEUROLOGY:  - Pain Control: IV Tylenol 1g q6hr, 25mcg fentanyl  - No further dilaudid given suspicion for allergic reaction  - Allergy testing outpatient    RESPIRATORY: B/L PE on Xarelto  - Monitor O2 Sat on NC    CARDIOVASCULAR: HTN, HLD, CHF (EF 35%), CAD s/p Stents x2 (15y ago), TAVR, AICD  - Weaned off negro, epi and vaso  - Lactate cleared     GASTROINTESTINAL/NUTRITION: GERD, Pancreatic CA  - NPO/NGT @ intermittent low wall suction  - IV Protonix 40mg BID. No further bloody NGT output.    /RENAL:  - LR @50 ml/hr  - Jimenez in place  - Monitor I&Os  - Monitor electrolytes, replete as needed    HEMATOLOGIC: b/l PE on xarelto  - SQH for DVT ppx  - Trend H&H  - Holding home xarelto    INFECTIOUS DISEASE: Hx of R hallux OM, s/p IV abx through RUE Picc  - Trend fever curve and WBC on CBC    ENDOCRINE:  - mISS q6hr per Whipple pathway    LINES:  - PIVs  - RUE PICC line  - L radial a-line  - Prevena incisional VAC    DISPO:  - SICU  - Full code

## 2021-10-28 NOTE — PROGRESS NOTE ADULT - SUBJECTIVE AND OBJECTIVE BOX
24 HOUR EVENTS:  - Lactate uptrended to 8. Received total of 3.5L crystalloid and 750 colloid (5% albumin). Started on vaso during the day and weaned off overnight. Discontinued epinephrine gtt. Lactate cleared and UOP improved.  - Rash resolved. Allergy and immunology consulted. No acute interventions. F/u outpatient for allergy testing.    SUBJECTIVE/ROS:  Mild abdominal pain. Denies chest pain.    NEURO  GCS 15    Exam: awake, alert, oriented    Meds: fentaNYL    Injectable 25 MICROGram(s) IV Push every 3 hours PRN pain    [x] Adequacy of sedation and pain control has been assessed and adjusted    RESPIRATORY  RR: 23 (10-28-21 @ 00:00) (9 - 44)  SpO2: 98% (10-28-21 @ 00:00) (93% - 100%)  Wt(kg): --  Exam: unlabored     Meds: diphenhydrAMINE Injectable 25 milliGRAM(s) IV Push every 4 hours PRN Pruritus    CARDIOVASCULAR  HR: 105 (10-28-21 @ 00:00) (90 - 111)  BP: 146/67 (10-27-21 @ 19:00) (146/67 - 146/67)  BP(mean): 97 (10-27-21 @ 19:00) (97 - 97)  ABP: 113/49 (10-28-21 @ 00:00) (93/43 - 158/65)  ABP(mean): 68 (10-28-21 @ 00:00) (57 - 156)    Exam: regular rate and rhythm  Cardiac Rhythm: sinus  Perfusion     [x]Adequate   [ ]Inadequate  Mentation   [x]Normal       [ ]Reduced  Extremities  [x]Warm         [ ]Cool  Volume Status [ ]Hypervolemic [x]Euvolemic [ ]Hypovolemic    GI/NUTRITION  Exam: soft, appropriatety tender, nondistended, incisional vac in place to suction, MOR serosang x2  Diet: NPO/NGT  Meds: pantoprazole  Injectable 40 milliGRAM(s) IV Push every 12 hours    GENITOURINARY:  I&O's Detail    10-26 @ 07:01  -  10-27 @ 07:00  --------------------------------------------------------  IN:    EPINEPHrine: 76.9 mL    IV PiggyBack: 300 mL    Lactated Ringers: 1300 mL    Lactated Ringers Bolus: 2000 mL    Phenylephrine: 89.4 mL    Vasopressin: 1.8 mL  Total IN: 3768.1 mL    OUT:    Bulb (mL): 170 mL    Bulb (mL): 120 mL    Indwelling Catheter - Urethral (mL): 490 mL    Nasogastric/Oral tube (mL): 350 mL  Total OUT: 1130 mL    Total NET: 2638.1 mL      10-27 @ 07:01  -  10-28 @ 01:10  --------------------------------------------------------  IN:    Albumin 5%  - 250 mL: 750 mL    EPINEPHrine: 131.4 mL    IV PiggyBack: 350 mL    Lactated Ringers: 1425 mL    Lactated Ringers: 700 mL    Lactated Ringers Bolus: 1000 mL    Vasopressin: 10.8 mL    Vasopressin: 7.2 mL  Total IN: 4374.4 mL    OUT:    Bulb (mL): 30 mL    Bulb (mL): 15 mL    Indwelling Catheter - Urethral (mL): 930 mL  Total OUT: 975 mL    Total NET: 3399.4 mL      10-27    136  |  106  |  17  ----------------------------<  132<H>  4.0   |  19<L>  |  0.82    Ca    8.1<L>      27 Oct 2021 21:05  Phos  2.5     10-27  Mg     2.2     10-27    TPro  5.6<L>  /  Alb  3.2<L>  /  TBili  0.7  /  DBili  x   /  AST  34  /  ALT  11  /  AlkPhos  61  10-27    [x] Jimenez catheter, indication: N/A    Meds: lactated ringers. 1000 milliLiter(s) IV Continuous <Continuous>  sodium phosphate IVPB 15 milliMole(s) IV Intermittent once    HEMATOLOGIC:  Meds: heparin   Injectable 5000 Unit(s) SubCutaneous every 8 hours    [x] VTE Prophylaxis                        8.2    4.18  )-----------( 111      ( 27 Oct 2021 21:05 )             26.5     PT/INR - ( 27 Oct 2021 03:07 )   PT: 14.0 sec;   INR: 1.18 ratio      PTT - ( 27 Oct 2021 03:07 )  PTT:28.4 sec    INFECTIOUS DISEASES  WBC Count: 4.18 K/uL (10-27 @ 21:05)  WBC Count: 5.12 K/uL (10-27 @ 15:26)  WBC Count: 10.76 K/uL (10-27 @ 11:22)  WBC Count: 13.07 K/uL (10-27 @ 07:20)  WBC Count: 13.08 K/uL (10-27 @ 03:07)    RECENT CULTURES:  Meds: influenza   Vaccine 0.5 milliLiter(s) IntraMuscular once    ENDOCRINE:  CAPILLARY BLOOD GLUCOSE:  POCT Blood Glucose.: 122 mg/dL (27 Oct 2021 20:58)  POCT Blood Glucose.: 117 mg/dL (27 Oct 2021 17:17)  POCT Blood Glucose.: 132 mg/dL (27 Oct 2021 06:11)    Meds: insulin lispro (ADMELOG) corrective regimen sliding scale   SubCutaneous every 4 hours    ACCESS DEVICES:  [x] Peripheral IV  [] Central Venous Line	[ ] R	[ ] L	[ ] IJ	[ ] Fem	[ ] SC	Placed:   [x] Arterial Line		[ ] R	[x] L	[ ] Fem	[x] Rad	[ ] Ax	Placed:   [x] PICC:	 				[ ] Mediport  [x] Urinary Catheter, Date Placed:   [x] Necessity of urinary, arterial, and venous catheters discussed    OTHER MEDICATIONS:  chlorhexidine 2% Cloths 1 Application(s) Topical <User Schedule>    CODE STATUS: Full code

## 2021-10-28 NOTE — PROGRESS NOTE ADULT - ASSESSMENT
80M with R foot partial first ray resection dehiscence   - Pt seen and evaluated   - VSS, afebrile, no leukocytosis  - R foot surgical site proximal sutures intact, distal sutures dehisced. Surgical flaps are warm and viable, no erythema, no malodor, no signs of acute infection, probes to bone. L foot is unremarkable for any signs of infection   -Pod plan for local wound care w/ daily packing  - RFXR ordered   - Discussed with attending

## 2021-10-28 NOTE — DISCHARGE NOTE PROVIDER - NPI NUMBER (FOR SYSADMIN USE ONLY) :
[[id number]] [[id number]],[[id number]],[[id number]]

## 2021-10-28 NOTE — DISCHARGE NOTE PROVIDER - NSDCCPTREATMENT_GEN_ALL_CORE_FT
PRINCIPAL PROCEDURE  Procedure: Pancreatectomy, Whipple, with pancreaticojejunostomy  Findings and Treatment: recover from surgery

## 2021-10-28 NOTE — DISCHARGE NOTE PROVIDER - NSDCFUSCHEDAPPT_GEN_ALL_CORE_FT
JUANIS DE SANTIAGO ; 01/19/2022 ; [facility]  JUANIS DE SANTIAGO ; 11/23/2021 ; [facility]  JUANIS DE SANTIAGO ; 01/19/2022 ; [facility] [address]

## 2021-10-28 NOTE — PROGRESS NOTE ADULT - ASSESSMENT
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 He required icd extraction, and a subq icd was replaced.  He is now s/p Whipple procedure    - now off of pressor support with improved BP  - Still having runs of short NSVT    - known history of systolic hf (mod lv dysfunction and mod ms)  - echo 10/27 with moderate ms at HR 80, tavr in place. severe LV dysfunction  - Appears compensated from HF POV.   - hold entresto   - agree with resume Lopressor IV. if possible increase to q4  - resume sotalol when able  - Need to replete lytes aggressively.       - history of PE, and hadbeen on xarelto  - resume ac when safe from a surgical perspective    - Other cardiovascular workup will depend on clinical course.  - will follow with you  - very high risk of decompensation, with  hypotension, HF, NSVT. >35 min spent taking care of patient, organizing care and discussing with team.

## 2021-10-28 NOTE — DISCHARGE NOTE PROVIDER - PROVIDER TOKENS
PROVIDER:[TOKEN:[[id number]],FOLLOWUP:[1 week]],PROVIDER:[TOKEN:[[id number]],FOLLOWUP:[1 week]],PROVIDER:[TOKEN:[[id number]],FOLLOWUP:[1 month]],PROVIDER:[TOKEN:[[id number]],FOLLOWUP:[1 week]] PROVIDER:[TOKEN:[[id number]],FOLLOWUP:[1 week]],PROVIDER:[TOKEN:[[id number]],FOLLOWUP:[1 month]],PROVIDER:[TOKEN:[[id number]],FOLLOWUP:[1 week]],PROVIDER:[TOKEN:[[id number]],FOLLOWUP:[1 week]]

## 2021-10-28 NOTE — PROGRESS NOTE ADULT - ATTENDING COMMENTS
81 [facility], lactate downtrending.  - N Multimodal pain management, pain service for PCA.  - P Sat >90, on NC. Pulmonary toilette.  - C Off pressors, lactate cleared.  - G NGT d/c'd. [facility] protocol. PPI ppx.  - R MIVF. UOP 1.3L/24h. Cr 0.7. Jimenez for monitoring.  - H Hgb 8.2/SCDs.  - DVT SQH/SCDs.  - I AF, monitor WBC.   - E ISS per protocol.  - Allergy service will follow opt.

## 2021-10-28 NOTE — PROGRESS NOTE ADULT - SUBJECTIVE AND OBJECTIVE BOX
Patient is a 80y old  Male who presents with a chief complaint of "I'm having pancreas surgery" (20 Oct 2021 16:43)       INTERVAL HPI/OVERNIGHT EVENTS:  Patient seen and evaluated at bedside.  Pt is resting comfortable in NAD. Denies N/V/F/C.      Allergies    Dilaudid (Anaphylaxis; Hives)    Intolerances        Vital Signs Last 24 Hrs  T(C): 36.9 (28 Oct 2021 11:00), Max: 36.9 (28 Oct 2021 11:00)  T(F): 98.4 (28 Oct 2021 11:00), Max: 98.4 (28 Oct 2021 11:00)  HR: 99 (28 Oct 2021 16:00) (93 - 110)  BP: 141/60 (28 Oct 2021 07:30) (141/60 - 146/67)  BP(mean): 87 (28 Oct 2021 07:30) (87 - 97)  RR: 22 (28 Oct 2021 16:00) (19 - 39)  SpO2: 100% (28 Oct 2021 16:00) (96% - 100%)    LABS:                        8.4    5.30  )-----------( 105      ( 28 Oct 2021 04:38 )             26.0     10-28    135  |  104  |  16  ----------------------------<  133<H>  4.0   |  20<L>  |  0.70    Ca    8.2<L>      28 Oct 2021 04:38  Phos  2.4     10-28  Mg     2.0     10-28    TPro  5.7<L>  /  Alb  3.1<L>  /  TBili  0.6  /  DBili  x   /  AST  37  /  ALT  7<L>  /  AlkPhos  60  10-28    PT/INR - ( 28 Oct 2021 04:38 )   PT: 16.7 sec;   INR: 1.41 ratio         PTT - ( 28 Oct 2021 04:38 )  PTT:32.3 sec    CAPILLARY BLOOD GLUCOSE      POCT Blood Glucose.: 159 mg/dL (28 Oct 2021 13:39)  POCT Blood Glucose.: 150 mg/dL (28 Oct 2021 11:17)  POCT Blood Glucose.: 121 mg/dL (28 Oct 2021 06:08)  POCT Blood Glucose.: 122 mg/dL (27 Oct 2021 20:58)  POCT Blood Glucose.: 117 mg/dL (27 Oct 2021 17:17)      Lower Extremity Physical Exam:  Vasular: DP/PT 1/4, B/L, CFT <3 seconds B/L, Temperature gradient warm to cool, B/L.   Neuro: Epicritic sensation diminished to the level of digits, B/L.  Musculoskeletal/Ortho: s/p R foot partial ray resection on 9/20  Skin: R foot surgical site proximal sutures intact, distal sutures dehisced. Surgical flaps are warm and viable, no erythema, no malodor, no signs of acute infection, probes to bone. L foot is unremarkable for any signs of infection     RADIOLOGY & ADDITIONAL TESTS:

## 2021-10-29 ENCOUNTER — NON-APPOINTMENT (OUTPATIENT)
Age: 81
End: 2021-10-29

## 2021-10-29 LAB
ALBUMIN SERPL ELPH-MCNC: 2.8 G/DL — LOW (ref 3.3–5)
ALBUMIN SERPL ELPH-MCNC: 3 G/DL — LOW (ref 3.3–5)
ALP SERPL-CCNC: 65 U/L — SIGNIFICANT CHANGE UP (ref 40–120)
ALP SERPL-CCNC: 69 U/L — SIGNIFICANT CHANGE UP (ref 40–120)
ALT FLD-CCNC: 8 U/L — LOW (ref 10–45)
ALT FLD-CCNC: 9 U/L — LOW (ref 10–45)
AMYLASE FLD-CCNC: 16 U/L — SIGNIFICANT CHANGE UP
AMYLASE FLD-CCNC: 24 U/L — SIGNIFICANT CHANGE UP
AMYLASE P1 CFR SERPL: 23 U/L — LOW (ref 25–125)
ANION GAP SERPL CALC-SCNC: 11 MMOL/L — SIGNIFICANT CHANGE UP (ref 5–17)
ANION GAP SERPL CALC-SCNC: 15 MMOL/L — SIGNIFICANT CHANGE UP (ref 5–17)
APTT BLD: 31.1 SEC — SIGNIFICANT CHANGE UP (ref 27.5–35.5)
AST SERPL-CCNC: 26 U/L — SIGNIFICANT CHANGE UP (ref 10–40)
AST SERPL-CCNC: 29 U/L — SIGNIFICANT CHANGE UP (ref 10–40)
BILIRUB SERPL-MCNC: 0.8 MG/DL — SIGNIFICANT CHANGE UP (ref 0.2–1.2)
BILIRUB SERPL-MCNC: 0.9 MG/DL — SIGNIFICANT CHANGE UP (ref 0.2–1.2)
BUN SERPL-MCNC: 14 MG/DL — SIGNIFICANT CHANGE UP (ref 7–23)
BUN SERPL-MCNC: 16 MG/DL — SIGNIFICANT CHANGE UP (ref 7–23)
CALCIUM SERPL-MCNC: 8.1 MG/DL — LOW (ref 8.4–10.5)
CALCIUM SERPL-MCNC: 8.2 MG/DL — LOW (ref 8.4–10.5)
CHLORIDE SERPL-SCNC: 101 MMOL/L — SIGNIFICANT CHANGE UP (ref 96–108)
CHLORIDE SERPL-SCNC: 104 MMOL/L — SIGNIFICANT CHANGE UP (ref 96–108)
CO2 SERPL-SCNC: 19 MMOL/L — LOW (ref 22–31)
CO2 SERPL-SCNC: 19 MMOL/L — LOW (ref 22–31)
CREAT SERPL-MCNC: 0.62 MG/DL — SIGNIFICANT CHANGE UP (ref 0.5–1.3)
CREAT SERPL-MCNC: 0.63 MG/DL — SIGNIFICANT CHANGE UP (ref 0.5–1.3)
GAS PNL BLDA: SIGNIFICANT CHANGE UP
GLUCOSE BLDC GLUCOMTR-MCNC: 114 MG/DL — HIGH (ref 70–99)
GLUCOSE BLDC GLUCOMTR-MCNC: 115 MG/DL — HIGH (ref 70–99)
GLUCOSE BLDC GLUCOMTR-MCNC: 123 MG/DL — HIGH (ref 70–99)
GLUCOSE BLDC GLUCOMTR-MCNC: 134 MG/DL — HIGH (ref 70–99)
GLUCOSE BLDC GLUCOMTR-MCNC: 94 MG/DL — SIGNIFICANT CHANGE UP (ref 70–99)
GLUCOSE SERPL-MCNC: 108 MG/DL — HIGH (ref 70–99)
GLUCOSE SERPL-MCNC: 129 MG/DL — HIGH (ref 70–99)
HCT VFR BLD CALC: 24.7 % — LOW (ref 39–50)
HCT VFR BLD CALC: 24.9 % — LOW (ref 39–50)
HGB BLD-MCNC: 7.9 G/DL — LOW (ref 13–17)
HGB BLD-MCNC: 8 G/DL — LOW (ref 13–17)
INR BLD: 1.12 RATIO — SIGNIFICANT CHANGE UP (ref 0.88–1.16)
MAGNESIUM SERPL-MCNC: 2 MG/DL — SIGNIFICANT CHANGE UP (ref 1.6–2.6)
MAGNESIUM SERPL-MCNC: 2.1 MG/DL — SIGNIFICANT CHANGE UP (ref 1.6–2.6)
MCHC RBC-ENTMCNC: 32 GM/DL — SIGNIFICANT CHANGE UP (ref 32–36)
MCHC RBC-ENTMCNC: 32.1 GM/DL — SIGNIFICANT CHANGE UP (ref 32–36)
MCHC RBC-ENTMCNC: 32.5 PG — SIGNIFICANT CHANGE UP (ref 27–34)
MCHC RBC-ENTMCNC: 32.6 PG — SIGNIFICANT CHANGE UP (ref 27–34)
MCV RBC AUTO: 101.2 FL — HIGH (ref 80–100)
MCV RBC AUTO: 102.1 FL — HIGH (ref 80–100)
NRBC # BLD: 0 /100 WBCS — SIGNIFICANT CHANGE UP (ref 0–0)
NRBC # BLD: 0 /100 WBCS — SIGNIFICANT CHANGE UP (ref 0–0)
PHOSPHATE SERPL-MCNC: 2.6 MG/DL — SIGNIFICANT CHANGE UP (ref 2.5–4.5)
PHOSPHATE SERPL-MCNC: 2.9 MG/DL — SIGNIFICANT CHANGE UP (ref 2.5–4.5)
PLATELET # BLD AUTO: 102 K/UL — LOW (ref 150–400)
PLATELET # BLD AUTO: 95 K/UL — LOW (ref 150–400)
POTASSIUM SERPL-MCNC: 3.8 MMOL/L — SIGNIFICANT CHANGE UP (ref 3.5–5.3)
POTASSIUM SERPL-MCNC: 3.9 MMOL/L — SIGNIFICANT CHANGE UP (ref 3.5–5.3)
POTASSIUM SERPL-SCNC: 3.8 MMOL/L — SIGNIFICANT CHANGE UP (ref 3.5–5.3)
POTASSIUM SERPL-SCNC: 3.9 MMOL/L — SIGNIFICANT CHANGE UP (ref 3.5–5.3)
PROT SERPL-MCNC: 5.7 G/DL — LOW (ref 6–8.3)
PROT SERPL-MCNC: 5.8 G/DL — LOW (ref 6–8.3)
PROTHROM AB SERPL-ACNC: 13.4 SEC — SIGNIFICANT CHANGE UP (ref 10.6–13.6)
RBC # BLD: 2.42 M/UL — LOW (ref 4.2–5.8)
RBC # BLD: 2.46 M/UL — LOW (ref 4.2–5.8)
RBC # FLD: 14.3 % — SIGNIFICANT CHANGE UP (ref 10.3–14.5)
RBC # FLD: 14.5 % — SIGNIFICANT CHANGE UP (ref 10.3–14.5)
SODIUM SERPL-SCNC: 134 MMOL/L — LOW (ref 135–145)
SODIUM SERPL-SCNC: 135 MMOL/L — SIGNIFICANT CHANGE UP (ref 135–145)
WBC # BLD: 4.68 K/UL — SIGNIFICANT CHANGE UP (ref 3.8–10.5)
WBC # BLD: 5.06 K/UL — SIGNIFICANT CHANGE UP (ref 3.8–10.5)
WBC # FLD AUTO: 4.68 K/UL — SIGNIFICANT CHANGE UP (ref 3.8–10.5)
WBC # FLD AUTO: 5.06 K/UL — SIGNIFICANT CHANGE UP (ref 3.8–10.5)

## 2021-10-29 PROCEDURE — 99291 CRITICAL CARE FIRST HOUR: CPT

## 2021-10-29 PROCEDURE — 99233 SBSQ HOSP IP/OBS HIGH 50: CPT

## 2021-10-29 RX ORDER — INSULIN LISPRO 100/ML
VIAL (ML) SUBCUTANEOUS
Refills: 0 | Status: DISCONTINUED | OUTPATIENT
Start: 2021-10-29 | End: 2021-11-15

## 2021-10-29 RX ORDER — ACETAMINOPHEN 500 MG
1000 TABLET ORAL EVERY 6 HOURS
Refills: 0 | Status: DISCONTINUED | OUTPATIENT
Start: 2021-10-29 | End: 2021-10-29

## 2021-10-29 RX ORDER — SODIUM CHLORIDE 9 MG/ML
1000 INJECTION, SOLUTION INTRAVENOUS
Refills: 0 | Status: DISCONTINUED | OUTPATIENT
Start: 2021-10-29 | End: 2021-11-02

## 2021-10-29 RX ORDER — POTASSIUM PHOSPHATE, MONOBASIC POTASSIUM PHOSPHATE, DIBASIC 236; 224 MG/ML; MG/ML
15 INJECTION, SOLUTION INTRAVENOUS ONCE
Refills: 0 | Status: COMPLETED | OUTPATIENT
Start: 2021-10-29 | End: 2021-10-29

## 2021-10-29 RX ORDER — ONDANSETRON 8 MG/1
4 TABLET, FILM COATED ORAL ONCE
Refills: 0 | Status: DISCONTINUED | OUTPATIENT
Start: 2021-10-29 | End: 2021-10-29

## 2021-10-29 RX ORDER — FENTANYL CITRATE 50 UG/ML
30 INJECTION INTRAVENOUS
Refills: 0 | Status: DISCONTINUED | OUTPATIENT
Start: 2021-10-29 | End: 2021-10-31

## 2021-10-29 RX ORDER — KETOROLAC TROMETHAMINE 0.5 %
1 DROPS OPHTHALMIC (EYE) DAILY
Refills: 0 | Status: DISCONTINUED | OUTPATIENT
Start: 2021-10-29 | End: 2021-11-15

## 2021-10-29 RX ORDER — ACETAMINOPHEN 500 MG
1000 TABLET ORAL EVERY 6 HOURS
Refills: 0 | Status: COMPLETED | OUTPATIENT
Start: 2021-10-29 | End: 2021-10-30

## 2021-10-29 RX ORDER — LATANOPROST 0.05 MG/ML
1 SOLUTION/ DROPS OPHTHALMIC; TOPICAL AT BEDTIME
Refills: 0 | Status: DISCONTINUED | OUTPATIENT
Start: 2021-10-29 | End: 2021-11-15

## 2021-10-29 RX ORDER — INSULIN LISPRO 100/ML
VIAL (ML) SUBCUTANEOUS AT BEDTIME
Refills: 0 | Status: DISCONTINUED | OUTPATIENT
Start: 2021-10-29 | End: 2021-11-15

## 2021-10-29 RX ORDER — DORZOLAMIDE HYDROCHLORIDE TIMOLOL MALEATE 20; 5 MG/ML; MG/ML
1 SOLUTION/ DROPS OPHTHALMIC
Refills: 0 | Status: DISCONTINUED | OUTPATIENT
Start: 2021-10-29 | End: 2021-11-15

## 2021-10-29 RX ORDER — MAGNESIUM SULFATE 500 MG/ML
1 VIAL (ML) INJECTION ONCE
Refills: 0 | Status: COMPLETED | OUTPATIENT
Start: 2021-10-29 | End: 2021-10-29

## 2021-10-29 RX ORDER — POTASSIUM CHLORIDE 20 MEQ
10 PACKET (EA) ORAL ONCE
Refills: 0 | Status: COMPLETED | OUTPATIENT
Start: 2021-10-29 | End: 2021-10-29

## 2021-10-29 RX ORDER — POTASSIUM CHLORIDE 20 MEQ
10 PACKET (EA) ORAL ONCE
Refills: 0 | Status: DISCONTINUED | OUTPATIENT
Start: 2021-10-29 | End: 2021-10-29

## 2021-10-29 RX ADMIN — Medication 5 MILLIGRAM(S): at 10:41

## 2021-10-29 RX ADMIN — Medication 5 MILLIGRAM(S): at 01:02

## 2021-10-29 RX ADMIN — Medication 400 MILLIGRAM(S): at 00:07

## 2021-10-29 RX ADMIN — HEPARIN SODIUM 5000 UNIT(S): 5000 INJECTION INTRAVENOUS; SUBCUTANEOUS at 06:00

## 2021-10-29 RX ADMIN — Medication 400 MILLIGRAM(S): at 11:38

## 2021-10-29 RX ADMIN — DORZOLAMIDE HYDROCHLORIDE TIMOLOL MALEATE 1 DROP(S): 20; 5 SOLUTION/ DROPS OPHTHALMIC at 17:23

## 2021-10-29 RX ADMIN — Medication 5 MILLIGRAM(S): at 14:09

## 2021-10-29 RX ADMIN — Medication 1 DROP(S): at 17:22

## 2021-10-29 RX ADMIN — FENTANYL CITRATE 30 MILLILITER(S): 50 INJECTION INTRAVENOUS at 11:37

## 2021-10-29 RX ADMIN — LATANOPROST 1 DROP(S): 0.05 SOLUTION/ DROPS OPHTHALMIC; TOPICAL at 21:14

## 2021-10-29 RX ADMIN — Medication 5 MILLIGRAM(S): at 06:10

## 2021-10-29 RX ADMIN — POTASSIUM PHOSPHATE, MONOBASIC POTASSIUM PHOSPHATE, DIBASIC 62.5 MILLIMOLE(S): 236; 224 INJECTION, SOLUTION INTRAVENOUS at 02:32

## 2021-10-29 RX ADMIN — Medication 5 MILLIGRAM(S): at 17:22

## 2021-10-29 RX ADMIN — Medication 62.5 MILLIMOLE(S): at 14:59

## 2021-10-29 RX ADMIN — Medication 1000 MILLIGRAM(S): at 00:37

## 2021-10-29 RX ADMIN — SODIUM CHLORIDE 100 MILLILITER(S): 9 INJECTION, SOLUTION INTRAVENOUS at 07:21

## 2021-10-29 RX ADMIN — Medication 1000 MILLIGRAM(S): at 17:37

## 2021-10-29 RX ADMIN — Medication 100 GRAM(S): at 15:10

## 2021-10-29 RX ADMIN — PANTOPRAZOLE SODIUM 40 MILLIGRAM(S): 20 TABLET, DELAYED RELEASE ORAL at 17:22

## 2021-10-29 RX ADMIN — HEPARIN SODIUM 5000 UNIT(S): 5000 INJECTION INTRAVENOUS; SUBCUTANEOUS at 14:09

## 2021-10-29 RX ADMIN — PANTOPRAZOLE SODIUM 40 MILLIGRAM(S): 20 TABLET, DELAYED RELEASE ORAL at 06:09

## 2021-10-29 RX ADMIN — Medication 5 MILLIGRAM(S): at 21:13

## 2021-10-29 RX ADMIN — Medication 102 MILLIGRAM(S): at 11:37

## 2021-10-29 RX ADMIN — Medication 400 MILLIGRAM(S): at 06:09

## 2021-10-29 RX ADMIN — CHLORHEXIDINE GLUCONATE 1 APPLICATION(S): 213 SOLUTION TOPICAL at 06:10

## 2021-10-29 RX ADMIN — FENTANYL CITRATE 30 MILLILITER(S): 50 INJECTION INTRAVENOUS at 07:09

## 2021-10-29 RX ADMIN — HEPARIN SODIUM 5000 UNIT(S): 5000 INJECTION INTRAVENOUS; SUBCUTANEOUS at 21:13

## 2021-10-29 RX ADMIN — Medication 100 MILLIEQUIVALENT(S): at 16:42

## 2021-10-29 RX ADMIN — FENTANYL CITRATE 30 MILLILITER(S): 50 INJECTION INTRAVENOUS at 19:14

## 2021-10-29 RX ADMIN — Medication 400 MILLIGRAM(S): at 17:22

## 2021-10-29 RX ADMIN — FENTANYL CITRATE 30 MILLILITER(S): 50 INJECTION INTRAVENOUS at 10:38

## 2021-10-29 RX ADMIN — Medication 1000 MILLIGRAM(S): at 06:39

## 2021-10-29 RX ADMIN — Medication 1000 MILLIGRAM(S): at 11:53

## 2021-10-29 NOTE — PROGRESS NOTE ADULT - ASSESSMENT
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 He required icd extraction, and a subq icd was replaced.  He is now s/p Whipple procedure    - now off of pressor support with improved BP  - Still having runs of short NSVT.  A few strips seem to be consistent with AF  - Metoprolol 5 IV Q4 added to patient regimen.  I agree with this.  Start PO sotalol as soon as possible.  He is known to have VT prior, and this is why he was on sotalol as an outpatient  - Keep K greater than 4 and Mg greater than 2    - known history of systolic hf (mod lv dysfunction and mod ms)  - echo 10/27 with moderate ms at HR 80, tavr in place. severe LV dysfunction  - Appears compensated from HF POV.   - hold entresto       - history of PE, and hadbeen on xarelto  - resume ac when safe from a surgical perspective    - Other cardiovascular workup will depend on clinical course.  - will follow with you  - very high risk of decompensation, with  hypotension, HF, NSVT. >35 min spent taking care of patient, organizing care and discussing with team.

## 2021-10-29 NOTE — PROGRESS NOTE ADULT - SUBJECTIVE AND OBJECTIVE BOX
[facility] - Milo Thomas, Adolfo, Binta, Arian Gu Savella  Office Number:  [phone]    Patient resting comfortably in bed in NAD.  Laying flat with no respiratory distress.  He is not complaining of any chest pain, increased dyspnea, palpitations, or dizziness.  He had a few runs of NSVT overnight.  He seems to have had runs of what appear to be AF, and abberated AF    F/U for:  CAD    Telemetry:  Above    MEDICATIONS  (STANDING):  chlorhexidine 2% Cloths 1 Application(s) Topical <User Schedule>  fentaNYL PCA (50 MICROgram(s)/mL) 30 milliLiter(s) PCA Continuous PCA Continuous  heparin   Injectable 5000 Unit(s) SubCutaneous every 8 hours  influenza   Vaccine 0.5 milliLiter(s) IntraMuscular once  insulin lispro (ADMELOG) corrective regimen sliding scale   SubCutaneous every 4 hours  metoprolol tartrate Injectable 5 milliGRAM(s) IV Push every 4 hours  multiple electrolytes Injection Type 1 1000 milliLiter(s) (100 mL/Hr) IV Continuous <Continuous>  pantoprazole  Injectable 40 milliGRAM(s) IV Push every 12 hours  phytonadione  IVPB 10 milliGRAM(s) IV Intermittent daily    MEDICATIONS  (PRN):  diphenhydrAMINE Injectable 25 milliGRAM(s) IV Push every 4 hours PRN Pruritus  fentaNYL  PCA (50 MICROgram(s)/mL) Rescue Clinician Bolus 10 MICROGram(s) IV Push every 15 minutes PRN for Pain Scale GREATER THAN 6  naloxone Injectable 0.1 milliGRAM(s) IV Push every 3 minutes PRN For ANY of the following changes in patient status:  A. RR LESS THAN 10 breaths per minute, B. Oxygen saturation LESS THAN 90%, C. Sedation score of 6  ondansetron Injectable 4 milliGRAM(s) IV Push every 6 hours PRN Nausea      Allergies    Dilaudid (Anaphylaxis; Hives)        Vital Signs Last 24 Hrs  T(C): 36.8 (29 Oct 2021 03:00), Max: 37.1 (28 Oct 2021 19:00)  T(F): 98.2 (29 Oct 2021 03:00), Max: 98.7 (28 Oct 2021 19:00)  HR: 99 (29 Oct 2021 07:00) (91 - 107)  BP: 125/62 (28 Oct 2021 19:00) (125/62 - 141/60)  BP(mean): 86 (28 Oct 2021 19:00) (86 - 87)  RR: 29 (29 Oct 2021 07:00) (19 - 29)  SpO2: 97% (29 Oct 2021 07:00) (87% - 100%)    I&O's Summary    28 Oct 2021 07:01  -  29 Oct 2021 07:00  --------------------------------------------------------  IN: 4474.8 mL / OUT: 1271 mL / NET: 3203.8 mL        ON EXAM:    General: NAD, awake and alert, oriented x 3  HEENT: Mucous membranes are moist, anicteric  Lungs: Non-labored, breath sounds are clear bilaterally, No wheezing, rales or rhonchi  Cardiovascular: Regular, tachy, S1 and S2, 2/6 systolic murmur  Gastrointestinal: Bowel Sounds present, soft, nontender. Wound dressed  Lymph: No peripheral edema. No lymphadenopathy.  Skin: No rashes or ulcers  Psych:  Mood & affect appropriate    LABS: All Labs Reviewed:                        7.9    4.68  )-----------( 95       ( 29 Oct 2021 01:21 )             24.7                         8.3    4.79  )-----------( 102      ( 28 Oct 2021 17:10 )             26.3                         8.4    5.30  )-----------( 105      ( 28 Oct 2021 04:38 )             26.0     29 Oct 2021 01:21    135    |  101    |  14     ----------------------------<  108    3.8     |  19     |  0.62   28 Oct 2021 17:09    134    |  103    |  14     ----------------------------<  133    3.6     |  21     |  0.62   28 Oct 2021 04:38    135    |  104    |  16     ----------------------------<  133    4.0     |  20     |  0.70     Ca    8.2        29 Oct 2021 01:21  Ca    8.4        28 Oct 2021 17:09  Ca    8.2        28 Oct 2021 04:38  Phos  2.6       29 Oct 2021 01:21  Phos  2.3       28 Oct 2021 17:09  Phos  2.4       28 Oct 2021 04:38  Mg     2.1       29 Oct 2021 01:21  Mg     2.3       28 Oct 2021 17:09  Mg     2.0       28 Oct 2021 04:38    TPro  5.7    /  Alb  3.0    /  TBili  0.8    /  DBili  x      /  AST  29     /  ALT  9      /  AlkPhos  65     29 Oct 2021 01:21  TPro  6.0    /  Alb  3.1    /  TBili  0.6    /  DBili  x      /  AST  34     /  ALT  9      /  AlkPhos  66     28 Oct 2021 17:09  TPro  5.7    /  Alb  3.1    /  TBili  0.6    /  DBili  x      /  AST  37     /  ALT  7      /  AlkPhos  60     28 Oct 2021 04:38    PT/INR - ( 29 Oct 2021 01:22 )   PT: 13.4 sec;   INR: 1.12 ratio         PTT - ( 29 Oct 2021 01:22 )  PTT:31.1 sec      Blood Culture:

## 2021-10-29 NOTE — PROGRESS NOTE ADULT - SUBJECTIVE AND OBJECTIVE BOX
24 HOUR EVENTS:  - rash resolved  - allergy and immunology consulted will f/u outpatient   - off pressors   - VTach runs on 10/28. Mg given    SUBJECTIVE/ROS:  [ x ] A ten-point review of systems was otherwise negative except as noted.  [ ] Due to altered mental status/intubation, subjective information were not able to be obtained from the patient. History was obtained, to the extent possible, from review of the chart and collateral sources of information.      NEURO  RASS:     GCS:  15  Exam: awake, alert, oriented  Meds: acetaminophen   IVPB .. 1000 milliGRAM(s) IV Intermittent every 6 hours  fentaNYL  PCA (50 MICROgram(s)/mL) Rescue Clinician Bolus 10 MICROGram(s) IV Push every 15 minutes PRN for Pain Scale GREATER THAN 6  fentaNYL PCA (50 MICROgram(s)/mL) 30 milliLiter(s) PCA Continuous PCA Continuous  ondansetron Injectable 4 milliGRAM(s) IV Push every 6 hours PRN Nausea    [x] Adequacy of sedation and pain control has been assessed and adjusted      RESPIRATORY  RR: 22 (10-29-21 @ 01:00) (19 - 26)  SpO2: 99% (10-29-21 @ 01:00) (87% - 100%)  Wt(kg): --  Exam: unlabored, clear to auscultation bilaterally  Mechanical Ventilation:   ABG - ( 29 Oct 2021 01:11 )  pH: 7.46  /  pCO2: 33    /  pO2: 81    / HCO3: 24    / Base Excess: -0.1  /  SaO2: 96.7    Lactate: x                [N/A] Extubation Readiness Assessed  Meds: diphenhydrAMINE Injectable 25 milliGRAM(s) IV Push every 4 hours PRN Pruritus        CARDIOVASCULAR  HR: 100 (10-29-21 @ 01:00) (91 - 109)  BP: 125/62 (10-28-21 @ 19:00) (125/62 - 141/60)  BP(mean): 86 (10-28-21 @ 19:00) (86 - 87)  ABP: 141/51 (10-29-21 @ 01:00) (122/51 - 176/65)  ABP(mean): 78 (10-29-21 @ 01:00) (74 - 97)  Wt(kg): --  CVP(cm H2O): --  VBG - ( 27 Oct 2021 15:35 )  pH: 7.30  /  pCO2: 44    /  pO2: 19    / HCO3: 22    / Base Excess: -4.6  /  SaO2: 29.0   Lactate: 4.9                Exam: regular rate and rhythm  Cardiac Rhythm: sinus  Perfusion     [x]Adequate   [ ]Inadequate  Mentation   [x]Normal       [ ]Reduced  Extremities  [x]Warm         [ ]Cool  Volume Status [ ]Hypervolemic [x]Euvolemic [ ]Hypovolemic  Meds: metoprolol tartrate Injectable 5 milliGRAM(s) IV Push every 4 hours        GI/NUTRITION  Exam: soft, nontender, nondistended, incision C/D/I  Diet: NPO  Meds: pantoprazole  Injectable 40 milliGRAM(s) IV Push every 12 hours      GENITOURINARY  I&O's Detail    10-27 @ 07:01  -  10-28 @ 07:00  --------------------------------------------------------  IN:    Albumin 5%  - 250 mL: 750 mL    EPINEPHrine: 131.4 mL    IV PiggyBack: 600 mL    Lactated Ringers: 700 mL    Lactated Ringers: 2475 mL    Lactated Ringers Bolus: 1000 mL    Vasopressin: 7.2 mL    Vasopressin: 10.8 mL  Total IN: 5674.4 mL    OUT:    Bulb (mL): 45 mL    Bulb (mL): 20 mL    Indwelling Catheter - Urethral (mL): 1305 mL    Nasogastric/Oral tube (mL): 50 mL  Total OUT: 1420 mL    Total NET: 4254.4 mL      10-28 @ 07:01  -  10-29 @ 01:35  --------------------------------------------------------  IN:    dextrose 5% + sodium chloride 0.45%: 975 mL    dextrose 5% + sodium chloride 0.45%: 100 mL    IV PiggyBack: 500 mL    IV PiggyBack: 849.8 mL    multiple electrolytes Injection Type 1.: 700 mL    sodium chloride 0.45%: 400 mL  Total IN: 3524.8 mL    OUT:    Bulb (mL): 5 mL    Bulb (mL): 30 mL    Indwelling Catheter - Urethral (mL): 810 mL    Nasogastric/Oral tube (mL): 150 mL    Vasopressin: 0 mL  Total OUT: 995 mL    Total NET: 2529.8 mL          10-28    134<L>  |  103  |  14  ----------------------------<  133<H>  3.6   |  21<L>  |  0.62    Ca    8.4      28 Oct 2021 17:09  Phos  2.3     10-28  Mg     2.3     10-28    TPro  6.0  /  Alb  3.1<L>  /  TBili  0.6  /  DBili  x   /  AST  34  /  ALT  9<L>  /  AlkPhos  66  10-28    [ x ] Jimenez catheter, indication: N/A  Meds: multiple electrolytes Injection Type 1 1000 milliLiter(s) IV Continuous <Continuous>  phytonadione  IVPB 10 milliGRAM(s) IV Intermittent daily        HEMATOLOGIC  Meds: heparin   Injectable 5000 Unit(s) SubCutaneous every 8 hours    [x] VTE Prophylaxis                        7.9    4.68  )-----------( 95       ( 29 Oct 2021 01:21 )             24.7     PT/INR - ( 28 Oct 2021 04:38 )   PT: 16.7 sec;   INR: 1.41 ratio         PTT - ( 28 Oct 2021 04:38 )  PTT:32.3 sec  Transfusion     [ ] PRBC   [ ] Platelets   [ ] FFP   [ ] Cryoprecipitate      INFECTIOUS DISEASES  WBC Count: 4.68 K/uL (10-29 @ 01:21)  WBC Count: 4.79 K/uL (10-28 @ 17:10)  WBC Count: 5.30 K/uL (10-28 @ 04:38)    RECENT CULTURES:    Meds: influenza   Vaccine 0.5 milliLiter(s) IntraMuscular once        ENDOCRINE  CAPILLARY BLOOD GLUCOSE      POCT Blood Glucose.: 115 mg/dL (29 Oct 2021 01:05)  POCT Blood Glucose.: 133 mg/dL (28 Oct 2021 21:48)  POCT Blood Glucose.: 159 mg/dL (28 Oct 2021 13:39)  POCT Blood Glucose.: 150 mg/dL (28 Oct 2021 11:17)  POCT Blood Glucose.: 121 mg/dL (28 Oct 2021 06:08)    Meds: insulin lispro (ADMELOG) corrective regimen sliding scale   SubCutaneous every 4 hours        ACCESS DEVICES:  [ ] Peripheral IV  [ ] Central Venous Line	[ ] R	[ ] L	[ ] IJ	[ ] Fem	[ ] SC	Placed:   [ ] Arterial Line		[ ] R	[ ] L	[ ] Fem	[ ] Rad	[ ] Ax	Placed:   [ x ] PICC: R basilic					[ ] Mediport  [ x ] Urinary Catheter, Date Placed:   [x] Necessity of urinary, arterial, and venous catheters discussed    OTHER MEDICATIONS:  chlorhexidine 2% Cloths 1 Application(s) Topical <User Schedule>  naloxone Injectable 0.1 milliGRAM(s) IV Push every 3 minutes PRN      CODE STATUS:      IMAGING:

## 2021-10-29 NOTE — PROGRESS NOTE ADULT - ASSESSMENT
80M with R foot partial first ray resection dehiscence   - Pt seen and evaluated   - R foot surgical site proximal sutures intact, distal sutures dehisced. Surgical flaps are warm and viable, no erythema, no malodor, no signs of acute infection, probes to bone. L foot is unremarkable for any signs of infection   -Pod plan for local wound care w/ daily packing  - RFXR changes likely post surgical changes  - pt underwent a 6 week antibiotics course after surgery in september  - will continue to monitor for signs of worsening at this time, no plan for surgical intervention at this time

## 2021-10-29 NOTE — PROGRESS NOTE ADULT - ASSESSMENT
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 Patient now s/p Whipple 10/27, recovering in SICU.     Plan:    -Anesthesia following for pain, f/u recs  -Fentanyl PCA  -Diet: CLD  -Dvt PPX; SQH  -1 MOR drain out today (low output drain)  -IVL   -Appreciate cardiology following, start beta blocker i/s/o v-tach  -DC hernandez  -TOV  -Appreciate excellent SICU care    [phone]  Red Surgery

## 2021-10-29 NOTE — PHYSICAL THERAPY INITIAL EVALUATION ADULT - MODALITIES TREATMENT COMMENTS
PT WC order received for VAC dressing application to abd surgical wound, isaias session well, pre-med for pain, wound received C/D/I, measured: 23.0cmx5.0cmx2.0cm, no erythema, no purulent, no malodor, cleansed with NS, cavilon to periwound, filled with white, then black foam,   good seal reached at 125mmHg continuous,

## 2021-10-29 NOTE — CHART NOTE - NSCHARTNOTEFT_GEN_A_CORE
Spoke with surgeon (Dr. Mercer) and anesthesiologist (Dr. Restrepo). They clarified that early on in the procedure, the patient became hypotensive and bradycardic, requiring vasopressors. This occurred when retractors were placed, and is thought to be due to a vagal response to peritoneal/abdominal wall stretch. BP normalized after pressors administered. No rash was visualized, although patient was draped. The dexamethasone he received was to prevent post-operative nausea and vomiting. Overall, there was no apparent allergic reaction noted in the OR.   No further recommendations at this time while inpatient, but would like to do drug testing after discharge.    Noreen Alicia MD, MPH  Fellow, Division of Allergy and Immunology  [facility] [employer] at [address], [address]  [place] [address]  Tel: [phone]

## 2021-10-29 NOTE — PROGRESS NOTE ADULT - SUBJECTIVE AND OBJECTIVE BOX
Day 3\1 of Anesthesia Pain Management Service    SUBJECTIVE: I'm having pain    Pain Scale Score:	[X] Refer to charted pain scores    THERAPY: [X ] IV PCA Fentanyl		[ ] 50 micrograms/mL    Demand dose: 10 mcg     Lockout: 6 minutes   Continuous Rate: 0 mcg/hr  4 Hour Limit: 200 mcg    MEDICATIONS  (STANDING):  chlorhexidine 2% Cloths 1 Application(s) Topical <User Schedule>  fentaNYL PCA (50 MICROgram(s)/mL) 30 milliLiter(s) PCA Continuous PCA Continuous  heparin   Injectable 5000 Unit(s) SubCutaneous every 8 hours  influenza   Vaccine 0.5 milliLiter(s) IntraMuscular once  insulin lispro (ADMELOG) corrective regimen sliding scale   SubCutaneous every 4 hours  metoprolol tartrate Injectable 5 milliGRAM(s) IV Push every 4 hours  pantoprazole  Injectable 40 milliGRAM(s) IV Push every 12 hours  phytonadione  IVPB 10 milliGRAM(s) IV Intermittent daily    MEDICATIONS  (PRN):  diphenhydrAMINE Injectable 25 milliGRAM(s) IV Push every 4 hours PRN Pruritus  fentaNYL  PCA (50 MICROgram(s)/mL) Rescue Clinician Bolus 10 MICROGram(s) IV Push every 15 minutes PRN for Pain Scale GREATER THAN 6  naloxone Injectable 0.1 milliGRAM(s) IV Push every 3 minutes PRN For ANY of the following changes in patient status:  A. RR LESS THAN 10 breaths per minute, B. Oxygen saturation LESS THAN 90%, C. Sedation score of 6  ondansetron Injectable 4 milliGRAM(s) IV Push every 6 hours PRN Nausea      OBJECTIVE:    Sedation Score:	[ X] Alert 	[ ] Drowsy 	[ ] Arousable	[ ] Asleep	[ ] Unresponsive    Side Effects:	[X ] None	[ ] Nausea	[ ] Vomiting	[ ] Pruritus  		[ ] Other:    Vital Signs Last 24 Hrs  T(C): 36.5 (29 Oct 2021 07:00), Max: 37.1 (28 Oct 2021 19:00)  T(F): 97.7 (29 Oct 2021 07:00), Max: 98.7 (28 Oct 2021 19:00)  HR: 94 (29 Oct 2021 07:40) (91 - 107)  BP: 161/70 (29 Oct 2021 07:40) (125/62 - 161/70)  BP(mean): 100 (29 Oct 2021 07:40) (86 - 100)  RR: 27 (29 Oct 2021 07:40) (19 - 29)  SpO2: 100% (29 Oct 2021 07:40) (87% - 100%)    ASSESSMENT/ PLAN    Therapy to  be:               [X] Continued   [ ] Discontinued   [ ] Changed to PRN Analgesics    Documentation and Verification of current medications:   [X] Done	[ ] Not done, not eligible    Comments: OOB in chair. Endorsing incisional pain. PCA use 1-3/hr. Add 25mcg basal. Monitor respiratory status.

## 2021-10-29 NOTE — PROGRESS NOTE ADULT - ASSESSMENT
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 Patient now s/p Whipple procedure.    PLAN:  NEUROLOGY:  - Pain Control: IV Tylenol 1g q6hr, 25mcg fentanyl PCA  - No further dilaudid given suspicion for allergic reaction  - Allergy testing outpatient    RESPIRATORY: B/L PE on Xarelto  - Monitor O2 Sat on NC    CARDIOVASCULAR: HTN, HLD, CHF (EF 35%), CAD s/p Stents x2 (15y ago), TAVR, AICD  - Weaned off negro, epi and vaso  - Lactate cleared   - added metoprolol 5mg q4h  - hold home sotalol  - Mg for VT; monitor    GASTROINTESTINAL/NUTRITION: GERD, Pancreatic CA  - NPO/NGT @ intermittent low wall suction  - IV Protonix 40mg BID. No further bloody NGT output.    /RENAL:  - plasmalyte @100 ml/hr  - Jimenez in place  - Monitor I&Os  - Monitor electrolytes, replete as needed    HEMATOLOGIC: b/l PE on xarelto  - SQH for DVT ppx  - Trend H&H  - Holding home xarelto  - Vit K x3d (10/28-)    INFECTIOUS DISEASE: Hx of R hallux OM, s/p IV abx through RUE Picc  - Trend fever curve and WBC on CBC    ENDOCRINE:  - mISS q6hr per Whipple pathway    LINES:  - PIVs  - RUE PICC line  - L radial a-line  - Prevena incisional VAC    DISPO:  - SICU  - Full code

## 2021-10-29 NOTE — PROGRESS NOTE ADULT - SUBJECTIVE AND OBJECTIVE BOX
Noninsulin Medicines for Type 2 Diabetes: Care Instructions  Your Care Instructions    There are different types of noninsulin medicines for diabetes. Each works in a different way. But they all help you control your blood sugar. Some types help your body make insulin to lower your blood sugar. Others lower how much insulin your body needs. Some can slow how fast your body digests sugars. And some can remove extra glucose through your urine. · Alpha-glucosidase inhibitors. These keep starches from breaking down. This means that they lower the amount of glucose absorbed when you eat. They don't help your body make more insulin. So they will not cause low blood sugar unless you use them with other medicines for diabetes. They include acarbose and miglitol. · DPP-4 inhibitors. These help your body raise the level of insulin after you eat. They also help your body make less of a hormone that raises blood sugar. They include linagliptin, saxagliptin, and sitagliptin. · Incretin hormones (GLP-1 receptor agonists). Your body makes a protein that can raise your insulin level. It also can lower your blood sugar and make you less hungry. You can get shots of hormones that work the same way. They include exenatide and liraglutide. · Meglitinides. These help your body release insulin. They also help slow how your body digests sugars. So they can keep your blood sugar from rising too fast after you eat. They include nateglinide and repaglinide. · Metformin. This lowers how much glucose your liver makes. And it helps you respond better to insulin. It also lowers the amount of stored sugar that your liver releases when you are not eating. · SGLT2 inhibitors. These help to remove extra glucose through your urine. They may also help some people lose weight. They include canagliflozin, dapagliflozin, and empagliflozin. · Sulfonylureas. These help your body release more insulin. Some work for many hours.  They can cause low Patient is a 80y old  Male who presents with a chief complaint of post-whipple care (29 Oct 2021 01:34)       INTERVAL HPI/OVERNIGHT EVENTS:  Patient seen and evaluated at bedside.  Pt is resting comfortable in NAD.     Allergies    Dilaudid (Anaphylaxis; Hives)    Intolerances        Vital Signs Last 24 Hrs  T(C): 36.5 (29 Oct 2021 23:00), Max: 36.8 (29 Oct 2021 03:00)  T(F): 97.7 (29 Oct 2021 23:00), Max: 98.2 (29 Oct 2021 03:00)  HR: 92 (29 Oct 2021 23:00) (84 - 103)  BP: 147/74 (29 Oct 2021 23:00) (127/98 - 161/70)  BP(mean): 101 (29 Oct 2021 23:00) (83 - 109)  RR: 23 (29 Oct 2021 23:00) (19 - 29)  SpO2: 92% (29 Oct 2021 23:00) (91% - 100%)    LABS:                        8.0    5.06  )-----------( 102      ( 29 Oct 2021 13:17 )             24.9     10-29    134<L>  |  104  |  16  ----------------------------<  129<H>  3.9   |  19<L>  |  0.63    Ca    8.1<L>      29 Oct 2021 13:17  Phos  2.9     10-29  Mg     2.0     10-29    TPro  5.8<L>  /  Alb  2.8<L>  /  TBili  0.9  /  DBili  x   /  AST  26  /  ALT  8<L>  /  AlkPhos  69  10-29    PT/INR - ( 29 Oct 2021 01:22 )   PT: 13.4 sec;   INR: 1.12 ratio         PTT - ( 29 Oct 2021 01:22 )  PTT:31.1 sec    CAPILLARY BLOOD GLUCOSE      POCT Blood Glucose.: 114 mg/dL (29 Oct 2021 21:15)  POCT Blood Glucose.: 123 mg/dL (29 Oct 2021 17:38)  POCT Blood Glucose.: 134 mg/dL (29 Oct 2021 10:44)  POCT Blood Glucose.: 94 mg/dL (29 Oct 2021 06:28)      Lower Extremity Physical Exam:  Vasular: DP/PT 1/4, B/L, CFT <3 seconds B/L, Temperature gradient warm to cool, B/L.   Neuro: Epicritic sensation diminished to the level of digits, B/L.  Musculoskeletal/Ortho: s/p R foot partial ray resection on 9/20  Skin: R foot surgical site with small area of dehiscence and ulcer formation to level of bone. Surgical flaps are warm and viable, no erythema, no malodor, no signs of acute infection, probes to bone. L foot is unremarkable for any signs of infection    blood sugar if you don't eat as you planned. They include glipizide and glyburide. · Thiazolidinediones. These reduce the amount of blood glucose. They also help you respond better to insulin. They include pioglitazone and rosiglitazone. You may need to take more than one medicine for diabetes. Two or more medicines may work better to lower your blood sugar level than just one does. Follow-up care is a key part of your treatment and safety. Be sure to make and go to all appointments, and call your doctor if you are having problems. It's also a good idea to know your test results and keep a list of the medicines you take. How can you care for yourself at home? · Eat a healthy diet. Get some exercise each day. This may help you to reduce how much medicine you need. · Do not take other prescription or over-the-counter medicines, vitamins, herbal products, or supplements without talking to your doctor first. Some medicines for type 2 diabetes can cause problems with other medicines or supplements. · Tell your doctor if you plan to get pregnant. Some of these drugs are not safe for pregnant women. · Be safe with medicines. Take your medicines exactly as prescribed. Meglitinides and sulfonylureas can cause your blood sugar to drop very low. Call your doctor if you think you are having a problem with your medicine. · Check your blood sugar often. You can use a glucose monitor. Keeping track can help you know how certain foods, activities, and medicines affect your blood sugar. And it can help you keep your blood sugar from getting so low that it's not safe. When should you call for help? Call 911 anytime you think you may need emergency care.  For example, call if:    · You passed out (lost consciousness).     · You are confused or cannot think clearly.     · Your blood sugar is very high or very low.    Watch closely for changes in your health, and be sure to contact your doctor if:    · Your blood sugar stays outside the level your doctor set for you.     · You have any problems. Where can you learn more? Go to http://pooja-shon.info/. Enter H153 in the search box to learn more about \"Noninsulin Medicines for Type 2 Diabetes: Care Instructions. \"  Current as of: July 25, 2018  Content Version: 11.9  © 5828-2787 [employer]. Care instructions adapted under license by [employer] (which disclaims liability or warranty for this information). If you have questions about a medical condition or this instruction, always ask your healthcare professional. [address] any warranty or liability for your use of this information.

## 2021-10-29 NOTE — PROGRESS NOTE ADULT - ATTENDING COMMENTS
79 yo m s/p Whipple and hypovolemic shock.  - N Multimodal pain management. Fentanyl gtt. AAO3.  - P RA sat >90. Pulmonary toilette.  - C Off pressors, monitor hemodynamics. Lactate cleared. 2 episodes Vtach overnight. Added metoprolol and sotalol. D/c a line.  - G Caden protocol. PO per primary team. Bowel regimen. PPI ppx. MOR per primary team.  - R D/c IVF. D/c Jimenez. Cr 0.62.  - DVT UFH/SCDs.  - H Holding home Xarelto. Monitor CBC. Vitamin k.  - I podiatry following R foot wound, dressing changes. Off abx.  - E Monitor glycemia. ISS. 79 yo m s/p Whipple and hypovolemic shock.  - N Multimodal pain management. Fentanyl gtt. Pain service following. AAO3.  - P RA sat >90. Pulmonary toilette.  - C Off pressors, monitor hemodynamics. Lactate cleared. 2 episodes Vtach overnight. Added metoprolol and sotalol. D/c a line.  - G Whipple protocol. PO per primary team. Bowel regimen. PPI ppx. MOR per primary team.  - R D/c IVF. D/c Jimenez. Cr 0.62.  - DVT UFH/SCDs.  - H Holding home Xarelto. Monitor CBC. Vitamin k.  - I podiatry following R foot wound, dressing changes. Off abx.  - E Monitor glycemia. ISS.

## 2021-10-29 NOTE — PROGRESS NOTE ADULT - SUBJECTIVE AND OBJECTIVE BOX
24h Events:  - rash resolved  - allergy and immunology consulted will f/u outpatient   - off pressors   - VTach runs on 10/28. Mg given    Subjective:   Patient seen at bedside this AM. Denies CP, SOB, fevers, chills, nausea, vomiting. Pt asking when he can eat real food, counseled that he still needs to take it slow and continue with liquids for now. Reports pain is well controlled.     Objective:  Vital Signs  T(C): 36.5 (10-29 @ 11:00), Max: 37.1 (10-28 @ 19:00)  HR: 98 (10-29 @ 13:00) (91 - 104)  BP: 138/71 (10-29 @ 13:00) (125/62 - 161/70)  RR: 27 (10-29 @ 13:00) (19 - 29)  SpO2: 99% (10-29 @ 13:00) (87% - 100%)  10-28-21 @ 07:01  -  10-29-21 @ 07:00  --------------------------------------------------------  IN:  Total IN: 0 mL    OUT:    Bulb (mL): 45 mL    Bulb (mL): 6 mL    Indwelling Catheter - Urethral (mL): 1070 mL    Nasogastric/Oral tube (mL): 150 mL  Total OUT: 1271 mL    Total NET: -1271 mL      10-29-21 @ 07:01  -  10-29-21 @ 14:22  --------------------------------------------------------  IN:  Total IN: 0 mL    OUT:    Bulb (mL): 70 mL    Bulb (mL): 5 mL    Indwelling Catheter - Urethral (mL): 260 mL  Total OUT: 335 mL    Total NET: -335 mL          Physical Exam:  GEN: resting in bed comfortably in NAD  RESP: no increased WOB  ABD: soft, non-distended, tender to palpation around incisions. Incision sites clean, dry, and intact without erythema or edema. MOR w SS output. Prevena vac holding suction.  NEURO: awake, alert    Labs:                        8.0    5.06  )-----------( 102      ( 29 Oct 2021 13:17 )             24.9   10-29    134<L>  |  104  |  16  ----------------------------<  129<H>  3.9   |  19<L>  |  0.63    Ca    8.1<L>      29 Oct 2021 13:17  Phos  2.9     10-29  Mg     2.0     10-29    TPro  5.8<L>  /  Alb  2.8<L>  /  TBili  0.9  /  DBili  x   /  AST  26  /  ALT  8<L>  /  AlkPhos  69  10-29    CAPILLARY BLOOD GLUCOSE      POCT Blood Glucose.: 134 mg/dL (29 Oct 2021 10:44)  POCT Blood Glucose.: 94 mg/dL (29 Oct 2021 06:28)  POCT Blood Glucose.: 115 mg/dL (29 Oct 2021 01:05)  POCT Blood Glucose.: 133 mg/dL (28 Oct 2021 21:48)      Imaging:

## 2021-10-29 NOTE — PHYSICAL THERAPY INITIAL EVALUATION ADULT - PERTINENT HX OF CURRENT PROBLEM, REHAB EVAL
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 Patient now s/p Whipple procedure.

## 2021-10-29 NOTE — PHYSICAL THERAPY INITIAL EVALUATION ADULT - ADDITIONAL COMMENTS
As per pt, pt lives in a condo alone and 2 steps to enter w/ UHR. PTA pt was independent w/ all mobility w/ RW and ADLs.

## 2021-10-30 LAB
ALBUMIN SERPL ELPH-MCNC: 2.8 G/DL — LOW (ref 3.3–5)
ALBUMIN SERPL ELPH-MCNC: 2.9 G/DL — LOW (ref 3.3–5)
ALBUMIN SERPL ELPH-MCNC: 3 G/DL — LOW (ref 3.3–5)
ALP SERPL-CCNC: 73 U/L — SIGNIFICANT CHANGE UP (ref 40–120)
ALP SERPL-CCNC: 75 U/L — SIGNIFICANT CHANGE UP (ref 40–120)
ALP SERPL-CCNC: 77 U/L — SIGNIFICANT CHANGE UP (ref 40–120)
ALT FLD-CCNC: 16 U/L — SIGNIFICANT CHANGE UP (ref 10–45)
ALT FLD-CCNC: 28 U/L — SIGNIFICANT CHANGE UP (ref 10–45)
ALT FLD-CCNC: 56 U/L — HIGH (ref 10–45)
AMYLASE P1 CFR SERPL: 17 U/L — LOW (ref 25–125)
AMYLASE P1 CFR SERPL: 20 U/L — LOW (ref 25–125)
ANION GAP SERPL CALC-SCNC: 10 MMOL/L — SIGNIFICANT CHANGE UP (ref 5–17)
ANION GAP SERPL CALC-SCNC: 13 MMOL/L — SIGNIFICANT CHANGE UP (ref 5–17)
ANION GAP SERPL CALC-SCNC: 14 MMOL/L — SIGNIFICANT CHANGE UP (ref 5–17)
APTT BLD: 27.6 SEC — SIGNIFICANT CHANGE UP (ref 27.5–35.5)
APTT BLD: 29 SEC — SIGNIFICANT CHANGE UP (ref 27.5–35.5)
AST SERPL-CCNC: 117 U/L — HIGH (ref 10–40)
AST SERPL-CCNC: 37 U/L — SIGNIFICANT CHANGE UP (ref 10–40)
AST SERPL-CCNC: 56 U/L — HIGH (ref 10–40)
BILIRUB SERPL-MCNC: 1.1 MG/DL — SIGNIFICANT CHANGE UP (ref 0.2–1.2)
BILIRUB SERPL-MCNC: 1.1 MG/DL — SIGNIFICANT CHANGE UP (ref 0.2–1.2)
BILIRUB SERPL-MCNC: 1.2 MG/DL — SIGNIFICANT CHANGE UP (ref 0.2–1.2)
BUN SERPL-MCNC: 18 MG/DL — SIGNIFICANT CHANGE UP (ref 7–23)
BUN SERPL-MCNC: 21 MG/DL — SIGNIFICANT CHANGE UP (ref 7–23)
BUN SERPL-MCNC: 23 MG/DL — SIGNIFICANT CHANGE UP (ref 7–23)
CALCIUM SERPL-MCNC: 8.2 MG/DL — LOW (ref 8.4–10.5)
CALCIUM SERPL-MCNC: 8.4 MG/DL — SIGNIFICANT CHANGE UP (ref 8.4–10.5)
CALCIUM SERPL-MCNC: 8.5 MG/DL — SIGNIFICANT CHANGE UP (ref 8.4–10.5)
CHLORIDE SERPL-SCNC: 101 MMOL/L — SIGNIFICANT CHANGE UP (ref 96–108)
CHLORIDE SERPL-SCNC: 103 MMOL/L — SIGNIFICANT CHANGE UP (ref 96–108)
CHLORIDE SERPL-SCNC: 105 MMOL/L — SIGNIFICANT CHANGE UP (ref 96–108)
CO2 SERPL-SCNC: 19 MMOL/L — LOW (ref 22–31)
CO2 SERPL-SCNC: 19 MMOL/L — LOW (ref 22–31)
CO2 SERPL-SCNC: 20 MMOL/L — LOW (ref 22–31)
CREAT SERPL-MCNC: 0.73 MG/DL — SIGNIFICANT CHANGE UP (ref 0.5–1.3)
CREAT SERPL-MCNC: 0.78 MG/DL — SIGNIFICANT CHANGE UP (ref 0.5–1.3)
CREAT SERPL-MCNC: 0.82 MG/DL — SIGNIFICANT CHANGE UP (ref 0.5–1.3)
GLUCOSE BLDC GLUCOMTR-MCNC: 100 MG/DL — HIGH (ref 70–99)
GLUCOSE BLDC GLUCOMTR-MCNC: 104 MG/DL — HIGH (ref 70–99)
GLUCOSE BLDC GLUCOMTR-MCNC: 130 MG/DL — HIGH (ref 70–99)
GLUCOSE SERPL-MCNC: 112 MG/DL — HIGH (ref 70–99)
GLUCOSE SERPL-MCNC: 118 MG/DL — HIGH (ref 70–99)
GLUCOSE SERPL-MCNC: 129 MG/DL — HIGH (ref 70–99)
HCT VFR BLD CALC: 25.7 % — LOW (ref 39–50)
HCT VFR BLD CALC: 27.5 % — LOW (ref 39–50)
HCT VFR BLD CALC: 27.5 % — LOW (ref 39–50)
HGB BLD-MCNC: 7.9 G/DL — LOW (ref 13–17)
HGB BLD-MCNC: 8.6 G/DL — LOW (ref 13–17)
HGB BLD-MCNC: 8.8 G/DL — LOW (ref 13–17)
INR BLD: 1.06 RATIO — SIGNIFICANT CHANGE UP (ref 0.88–1.16)
INR BLD: 1.07 RATIO — SIGNIFICANT CHANGE UP (ref 0.88–1.16)
MAGNESIUM SERPL-MCNC: 2 MG/DL — SIGNIFICANT CHANGE UP (ref 1.6–2.6)
MAGNESIUM SERPL-MCNC: 2 MG/DL — SIGNIFICANT CHANGE UP (ref 1.6–2.6)
MAGNESIUM SERPL-MCNC: 2.1 MG/DL — SIGNIFICANT CHANGE UP (ref 1.6–2.6)
MCHC RBC-ENTMCNC: 30.7 GM/DL — LOW (ref 32–36)
MCHC RBC-ENTMCNC: 31.3 GM/DL — LOW (ref 32–36)
MCHC RBC-ENTMCNC: 32 GM/DL — SIGNIFICANT CHANGE UP (ref 32–36)
MCHC RBC-ENTMCNC: 32 PG — SIGNIFICANT CHANGE UP (ref 27–34)
MCHC RBC-ENTMCNC: 32.2 PG — SIGNIFICANT CHANGE UP (ref 27–34)
MCHC RBC-ENTMCNC: 32.4 PG — SIGNIFICANT CHANGE UP (ref 27–34)
MCV RBC AUTO: 101.1 FL — HIGH (ref 80–100)
MCV RBC AUTO: 103 FL — HIGH (ref 80–100)
MCV RBC AUTO: 104 FL — HIGH (ref 80–100)
NRBC # BLD: 0 /100 WBCS — SIGNIFICANT CHANGE UP (ref 0–0)
PHOSPHATE SERPL-MCNC: 3.2 MG/DL — SIGNIFICANT CHANGE UP (ref 2.5–4.5)
PHOSPHATE SERPL-MCNC: 3.4 MG/DL — SIGNIFICANT CHANGE UP (ref 2.5–4.5)
PHOSPHATE SERPL-MCNC: 3.4 MG/DL — SIGNIFICANT CHANGE UP (ref 2.5–4.5)
PLATELET # BLD AUTO: 107 K/UL — LOW (ref 150–400)
PLATELET # BLD AUTO: 130 K/UL — LOW (ref 150–400)
PLATELET # BLD AUTO: 142 K/UL — LOW (ref 150–400)
POTASSIUM SERPL-MCNC: 3.8 MMOL/L — SIGNIFICANT CHANGE UP (ref 3.5–5.3)
POTASSIUM SERPL-MCNC: 4.1 MMOL/L — SIGNIFICANT CHANGE UP (ref 3.5–5.3)
POTASSIUM SERPL-MCNC: 4.2 MMOL/L — SIGNIFICANT CHANGE UP (ref 3.5–5.3)
POTASSIUM SERPL-SCNC: 3.8 MMOL/L — SIGNIFICANT CHANGE UP (ref 3.5–5.3)
POTASSIUM SERPL-SCNC: 4.1 MMOL/L — SIGNIFICANT CHANGE UP (ref 3.5–5.3)
POTASSIUM SERPL-SCNC: 4.2 MMOL/L — SIGNIFICANT CHANGE UP (ref 3.5–5.3)
PROT SERPL-MCNC: 5.9 G/DL — LOW (ref 6–8.3)
PROT SERPL-MCNC: 6 G/DL — SIGNIFICANT CHANGE UP (ref 6–8.3)
PROT SERPL-MCNC: 6.2 G/DL — SIGNIFICANT CHANGE UP (ref 6–8.3)
PROTHROM AB SERPL-ACNC: 12.7 SEC — SIGNIFICANT CHANGE UP (ref 10.6–13.6)
PROTHROM AB SERPL-ACNC: 12.8 SEC — SIGNIFICANT CHANGE UP (ref 10.6–13.6)
RBC # BLD: 2.47 M/UL — LOW (ref 4.2–5.8)
RBC # BLD: 2.67 M/UL — LOW (ref 4.2–5.8)
RBC # BLD: 2.72 M/UL — LOW (ref 4.2–5.8)
RBC # FLD: 14.5 % — SIGNIFICANT CHANGE UP (ref 10.3–14.5)
RBC # FLD: 14.5 % — SIGNIFICANT CHANGE UP (ref 10.3–14.5)
RBC # FLD: 14.6 % — HIGH (ref 10.3–14.5)
SODIUM SERPL-SCNC: 134 MMOL/L — LOW (ref 135–145)
SODIUM SERPL-SCNC: 135 MMOL/L — SIGNIFICANT CHANGE UP (ref 135–145)
SODIUM SERPL-SCNC: 135 MMOL/L — SIGNIFICANT CHANGE UP (ref 135–145)
TRIGL FLD-MCNC: 39 MG/DL — SIGNIFICANT CHANGE UP
WBC # BLD: 4.73 K/UL — SIGNIFICANT CHANGE UP (ref 3.8–10.5)
WBC # BLD: 5.53 K/UL — SIGNIFICANT CHANGE UP (ref 3.8–10.5)
WBC # BLD: 5.82 K/UL — SIGNIFICANT CHANGE UP (ref 3.8–10.5)
WBC # FLD AUTO: 4.73 K/UL — SIGNIFICANT CHANGE UP (ref 3.8–10.5)
WBC # FLD AUTO: 5.53 K/UL — SIGNIFICANT CHANGE UP (ref 3.8–10.5)
WBC # FLD AUTO: 5.82 K/UL — SIGNIFICANT CHANGE UP (ref 3.8–10.5)

## 2021-10-30 PROCEDURE — 71045 X-RAY EXAM CHEST 1 VIEW: CPT | Mod: 26

## 2021-10-30 PROCEDURE — 99291 CRITICAL CARE FIRST HOUR: CPT

## 2021-10-30 PROCEDURE — 99233 SBSQ HOSP IP/OBS HIGH 50: CPT

## 2021-10-30 RX ORDER — ACETAMINOPHEN 500 MG
1000 TABLET ORAL EVERY 6 HOURS
Refills: 0 | Status: COMPLETED | OUTPATIENT
Start: 2021-10-30 | End: 2021-10-31

## 2021-10-30 RX ORDER — LIDOCAINE 4 G/100G
1 CREAM TOPICAL DAILY
Refills: 0 | Status: DISCONTINUED | OUTPATIENT
Start: 2021-10-30 | End: 2021-11-15

## 2021-10-30 RX ORDER — POTASSIUM CHLORIDE 20 MEQ
10 PACKET (EA) ORAL
Refills: 0 | Status: COMPLETED | OUTPATIENT
Start: 2021-10-30 | End: 2021-10-30

## 2021-10-30 RX ORDER — SOTALOL HCL 120 MG
40 TABLET ORAL EVERY 12 HOURS
Refills: 0 | Status: DISCONTINUED | OUTPATIENT
Start: 2021-10-30 | End: 2021-11-01

## 2021-10-30 RX ORDER — ALPRAZOLAM 0.25 MG
0.25 TABLET ORAL THREE TIMES A DAY
Refills: 0 | Status: DISCONTINUED | OUTPATIENT
Start: 2021-10-30 | End: 2021-11-03

## 2021-10-30 RX ADMIN — Medication 102 MILLIGRAM(S): at 11:45

## 2021-10-30 RX ADMIN — Medication 100 MILLIEQUIVALENT(S): at 05:44

## 2021-10-30 RX ADMIN — DORZOLAMIDE HYDROCHLORIDE TIMOLOL MALEATE 1 DROP(S): 20; 5 SOLUTION/ DROPS OPHTHALMIC at 18:27

## 2021-10-30 RX ADMIN — Medication 1 DROP(S): at 18:27

## 2021-10-30 RX ADMIN — HEPARIN SODIUM 5000 UNIT(S): 5000 INJECTION INTRAVENOUS; SUBCUTANEOUS at 14:39

## 2021-10-30 RX ADMIN — Medication 400 MILLIGRAM(S): at 20:28

## 2021-10-30 RX ADMIN — ONDANSETRON 4 MILLIGRAM(S): 8 TABLET, FILM COATED ORAL at 16:18

## 2021-10-30 RX ADMIN — Medication 400 MILLIGRAM(S): at 00:22

## 2021-10-30 RX ADMIN — Medication 5 MILLIGRAM(S): at 09:54

## 2021-10-30 RX ADMIN — FENTANYL CITRATE 10 MICROGRAM(S): 50 INJECTION INTRAVENOUS at 16:14

## 2021-10-30 RX ADMIN — SODIUM CHLORIDE 20 MILLILITER(S): 9 INJECTION, SOLUTION INTRAVENOUS at 09:54

## 2021-10-30 RX ADMIN — HEPARIN SODIUM 5000 UNIT(S): 5000 INJECTION INTRAVENOUS; SUBCUTANEOUS at 05:44

## 2021-10-30 RX ADMIN — FENTANYL CITRATE 30 MILLILITER(S): 50 INJECTION INTRAVENOUS at 18:00

## 2021-10-30 RX ADMIN — Medication 5 MILLIGRAM(S): at 05:44

## 2021-10-30 RX ADMIN — CHLORHEXIDINE GLUCONATE 1 APPLICATION(S): 213 SOLUTION TOPICAL at 05:46

## 2021-10-30 RX ADMIN — LATANOPROST 1 DROP(S): 0.05 SOLUTION/ DROPS OPHTHALMIC; TOPICAL at 22:12

## 2021-10-30 RX ADMIN — Medication 40 MILLIGRAM(S): at 18:27

## 2021-10-30 RX ADMIN — FENTANYL CITRATE 10 MICROGRAM(S): 50 INJECTION INTRAVENOUS at 00:52

## 2021-10-30 RX ADMIN — Medication 5 MILLIGRAM(S): at 02:55

## 2021-10-30 RX ADMIN — DORZOLAMIDE HYDROCHLORIDE TIMOLOL MALEATE 1 DROP(S): 20; 5 SOLUTION/ DROPS OPHTHALMIC at 05:45

## 2021-10-30 RX ADMIN — FENTANYL CITRATE 30 MILLILITER(S): 50 INJECTION INTRAVENOUS at 19:18

## 2021-10-30 RX ADMIN — Medication 0.25 MILLIGRAM(S): at 22:10

## 2021-10-30 RX ADMIN — Medication 100 MILLIEQUIVALENT(S): at 03:21

## 2021-10-30 RX ADMIN — HEPARIN SODIUM 5000 UNIT(S): 5000 INJECTION INTRAVENOUS; SUBCUTANEOUS at 22:11

## 2021-10-30 RX ADMIN — Medication 400 MILLIGRAM(S): at 14:39

## 2021-10-30 RX ADMIN — LIDOCAINE 1 PATCH: 4 CREAM TOPICAL at 22:10

## 2021-10-30 RX ADMIN — Medication 1000 MILLIGRAM(S): at 14:54

## 2021-10-30 RX ADMIN — PANTOPRAZOLE SODIUM 40 MILLIGRAM(S): 20 TABLET, DELAYED RELEASE ORAL at 05:46

## 2021-10-30 RX ADMIN — ONDANSETRON 4 MILLIGRAM(S): 8 TABLET, FILM COATED ORAL at 02:30

## 2021-10-30 RX ADMIN — PANTOPRAZOLE SODIUM 40 MILLIGRAM(S): 20 TABLET, DELAYED RELEASE ORAL at 18:28

## 2021-10-30 RX ADMIN — FENTANYL CITRATE 30 MILLILITER(S): 50 INJECTION INTRAVENOUS at 07:20

## 2021-10-30 RX ADMIN — Medication 400 MILLIGRAM(S): at 05:45

## 2021-10-30 RX ADMIN — SODIUM CHLORIDE 20 MILLILITER(S): 9 INJECTION, SOLUTION INTRAVENOUS at 22:12

## 2021-10-30 NOTE — PROGRESS NOTE ADULT - ATTENDING COMMENTS
S/p Whipple and hypovolemic shock, now resolved.  Pain service following,  RA saturating >90.  Resume sotalol per his primary cardiologist.  Mary Grace PO. PPI ppx.  , sending triglycerides.   Monitor Cr and UOP.  Electrolyte replacement.  UFH/SCDs for DVT ppx.  Following ippadore protocol.

## 2021-10-30 NOTE — PROGRESS NOTE ADULT - SUBJECTIVE AND OBJECTIVE BOX
24h Events:  - Jimenez, A-line d/c'ed, KVO; start limited clears   - VTach runs on 10/28. Mg given  - No acute events overnight    Subjective:   Patient seen at bedside this AM.    Objective:  Vital Signs  T(C): 36.5 (10-29 @ 11:00), Max: 37.1 (10-28 @ 19:00)  HR: 98 (10-29 @ 13:00) (91 - 104)  BP: 138/71 (10-29 @ 13:00) (125/62 - 161/70)  RR: 27 (10-29 @ 13:00) (19 - 29)  SpO2: 99% (10-29 @ 13:00) (87% - 100%)  10-28-21 @ 07:01  -  10-29-21 @ 07:00  --------------------------------------------------------  IN:  Total IN: 0 mL    OUT:    Bulb (mL): 45 mL    Bulb (mL): 6 mL    Indwelling Catheter - Urethral (mL): 1070 mL    Nasogastric/Oral tube (mL): 150 mL  Total OUT: 1271 mL    Total NET: -1271 mL      10-29-21 @ 07:01  -  10-29-21 @ 14:22  --------------------------------------------------------  IN:  Total IN: 0 mL    OUT:    Bulb (mL): 70 mL    Bulb (mL): 5 mL    Indwelling Catheter - Urethral (mL): 260 mL  Total OUT: 335 mL    Total NET: -335 mL          Physical Exam:  GEN: resting in bed comfortably in NAD  RESP: no increased WOB  ABD: soft, non-distended, tender to palpation around incisions. Incision sites clean, dry, and intact without erythema or edema. MOR w SS output. Prevena vac holding suction.  NEURO: awake, alert    Labs:                        8.0    5.06  )-----------( 102      ( 29 Oct 2021 13:17 )             24.9   10-29    134<L>  |  104  |  16  ----------------------------<  129<H>  3.9   |  19<L>  |  0.63    Ca    8.1<L>      29 Oct 2021 13:17  Phos  2.9     10-29  Mg     2.0     10-29    TPro  5.8<L>  /  Alb  2.8<L>  /  TBili  0.9  /  DBili  x   /  AST  26  /  ALT  8<L>  /  AlkPhos  69  10-29    CAPILLARY BLOOD GLUCOSE      POCT Blood Glucose.: 134 mg/dL (29 Oct 2021 10:44)  POCT Blood Glucose.: 94 mg/dL (29 Oct 2021 06:28)  POCT Blood Glucose.: 115 mg/dL (29 Oct 2021 01:05)  POCT Blood Glucose.: 133 mg/dL (28 Oct 2021 21:48)      Imaging:

## 2021-10-30 NOTE — PROGRESS NOTE ADULT - NUTRITIONAL ASSESSMENT
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 He required icd extraction, and a subq icd was replaced.  He is now s/p Whipple procedure    - now off of pressor support with improved BP  - having runs of NSVT, up to 14 beats.  A few strips seem to be consistent with AF  - remains on iv metoprolol but now that he is on clear liquid diet would be reasonable to resume po sotalol, which he has been taking as an outpt    - Keep K greater than 4 and Mg greater than 2    - known history of systolic hf (mod lv dysfunction and mod ms)  - echo 10/27 with moderate ms at HR 80, tavr in place. severe LV dysfunction  - Appears compensated from HF POV.   - holding entresto, and would consider resuming it if his oral intake begins to normalize, noting that his bp is improved     - history of PE, and had been on xarelto  - resume ac when safe from a surgical perspective    - Other cardiovascular workup will depend on clinical course.  - will follow with you  - very high risk of decompensation, with  hypotension, HF, NSVT. >35 min spent taking care of patient, organizing care and discussing with team.

## 2021-10-30 NOTE — PROGRESS NOTE ADULT - ASSESSMENT
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 Patient now s/p Whipple 10/27, recovering in SICU.     Plan  -Anesthesia following for pain, f/u continue IV PCA   -advance to FLD  -Dvt PPX; SQH  -Appreciate cardiology following, resume home med sotolal since patient tolerating PO  -Appreciate excellent SICU care    [phone]  [facility]

## 2021-10-30 NOTE — PROGRESS NOTE ADULT - ASSESSMENT
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 Patient now s/p Whipple procedure.    PLAN:  NEUROLOGY:  - Pain Control: IV Tylenol 1g q6hr, Fentanyl PCA  - Benadryl 25mg q4hr PRN  - Allergy testing outpatient    RESPIRATORY: B/L PE on Xarelto  - Monitor O2 Sat on NC    CARDIOVASCULAR: HTN, HLD, CHF (EF 35%), CAD s/p Stents x2 (15y ago), TAVR, AICD  - Metoprolol 5mg q4h  - Monitor hemodynamics    GASTROINTESTINAL/NUTRITION: GERD, Pancreatic CA  - CLD  - IV Protonix 40mg BID. No further bloody NGT output.  - Zofran 4mg q6hr PRN    /RENAL:  - LR @ 20cc/hr  - Jimenez in place  - Monitor I&Os  - Monitor electrolytes, replete as needed    HEMATOLOGIC: b/l PE on xarelto  - SQH for DVT ppx  - Holding home xarelto  - Vit K x3d (10/28-)  - Trend H&H    INFECTIOUS DISEASE: Hx of R hallux OM, s/p IV abx through RUE Picc  - Trend fever curve and WBC on CBC    ENDOCRINE:  - mISS AC/HS     LINES:  - PIVs  - RUE PICC line  - L radial a-line  - Prevena incisional VAC    DISPO:  - SICU  - Full code

## 2021-10-30 NOTE — PROGRESS NOTE ADULT - SUBJECTIVE AND OBJECTIVE BOX
[facility]    Milo Thomas, Adolfo, Binta, Brittanie, Arian, Javier      [phone]    CHIEF COMPLAINT: Patient is a 80y old  Male who presents with a chief complaint of post-whipple care (29 Oct 2021 01:34)      Follow Up: vt, s/p whipple    Interim history: The patient reports no new symptoms.  Denies chest discomfort and shortness of breath.  No abdominal pain.  No new neurologic symptoms. Has had several brief runs of VT up to 14 beats      MEDICATIONS  (STANDING):  chlorhexidine 2% Cloths 1 Application(s) Topical <User Schedule>  dorzolamide 2%/timolol 0.5% Ophthalmic Solution 1 Drop(s) Both EYES two times a day  fentaNYL PCA (50 MICROgram(s)/mL) 30 milliLiter(s) PCA Continuous PCA Continuous  heparin   Injectable 5000 Unit(s) SubCutaneous every 8 hours  influenza   Vaccine 0.5 milliLiter(s) IntraMuscular once  insulin lispro (ADMELOG) corrective regimen sliding scale   SubCutaneous three times a day before meals  insulin lispro (ADMELOG) corrective regimen sliding scale   SubCutaneous at bedtime  ketorolac 0.5% Ophthalmic Solution 1 Drop(s) Both EYES daily  lactated ringers. 1000 milliLiter(s) (20 mL/Hr) IV Continuous <Continuous>  latanoprost 0.005% Ophthalmic Solution 1 Drop(s) Both EYES at bedtime  metoprolol tartrate Injectable 5 milliGRAM(s) IV Push every 4 hours  pantoprazole  Injectable 40 milliGRAM(s) IV Push every 12 hours  phytonadione  IVPB 10 milliGRAM(s) IV Intermittent daily    MEDICATIONS  (PRN):  diphenhydrAMINE Injectable 25 milliGRAM(s) IV Push every 4 hours PRN Pruritus  fentaNYL  PCA (50 MICROgram(s)/mL) Rescue Clinician Bolus 10 MICROGram(s) IV Push every 15 minutes PRN for Pain Scale GREATER THAN 6  naloxone Injectable 0.1 milliGRAM(s) IV Push every 3 minutes PRN For ANY of the following changes in patient status:  A. RR LESS THAN 10 breaths per minute, B. Oxygen saturation LESS THAN 90%, C. Sedation score of 6  ondansetron Injectable 4 milliGRAM(s) IV Push every 6 hours PRN Nausea      REVIEW OF SYSTEMS:  eye, ent, GI, , allergic, dermatologic, musculoskeletal and neurologic are negative except as described above    Vital Signs Last 24 Hrs  T(C): 36.7 (30 Oct 2021 03:00), Max: 36.7 (29 Oct 2021 15:00)  T(F): 98 (30 Oct 2021 03:00), Max: 98.1 (29 Oct 2021 15:00)  HR: 79 (30 Oct 2021 06:00) (79 - 104)  BP: 151/76 (30 Oct 2021 06:00) (127/98 - 164/80)  BP(mean): 106 (30 Oct 2021 06:00) (83 - 115)  RR: 17 (30 Oct 2021 06:00) (16 - 29)  SpO2: 96% (30 Oct 2021 06:00) (89% - 100%)    I&O's Summary    28 Oct 2021 07:01  -  29 Oct 2021 07:00  --------------------------------------------------------  IN: 4474.8 mL / OUT: 1271 mL / NET: 3203.8 mL    29 Oct 2021 07:01  -  30 Oct 2021 06:52  --------------------------------------------------------  IN: 1700 mL / OUT: 835 mL / NET: 865 mL        Telemetry past 24h: mult brief runs of vt up to 14 beats    PHYSICAL EXAM:    Constitutional: well-nourished, well-developed, NAD   HEENT:  MMM, sclerae anicteric, conjunctivae clear, no oral cyanosis.  Pulmonary: Non-labored, breath sounds are clear bilaterally, No wheezing, rales or rhonchi  Cardiovascular: Regular, S1 and S2.  No murmur.  No rubs, gallops or clicks  Gastrointestinal: Bowel Sounds not readily audible, soft  Lymph: No peripheral edema.   Neurological: Alert, no focal deficits  Skin: No rashes.  Psych:  Mood & affect appropriate    LABS: All Labs Reviewed:                        7.9    4.73  )-----------( 107      ( 30 Oct 2021 01:32 )             25.7                         8.0    5.06  )-----------( 102      ( 29 Oct 2021 13:17 )             24.9                         7.9    4.68  )-----------( 95       ( 29 Oct 2021 01:21 )             24.7     30 Oct 2021 02:26    135    |  103    |  18     ----------------------------<  118    3.8     |  19     |  0.73   29 Oct 2021 13:17    134    |  104    |  16     ----------------------------<  129    3.9     |  19     |  0.63   29 Oct 2021 01:21    135    |  101    |  14     ----------------------------<  108    3.8     |  19     |  0.62     Ca    8.2        30 Oct 2021 02:26  Ca    8.1        29 Oct 2021 13:17  Ca    8.2        29 Oct 2021 01:21  Phos  3.2       30 Oct 2021 02:26  Phos  2.9       29 Oct 2021 13:17  Phos  2.6       29 Oct 2021 01:21  Mg     2.1       30 Oct 2021 02:26  Mg     2.0       29 Oct 2021 13:17  Mg     2.1       29 Oct 2021 01:21    TPro  5.9    /  Alb  2.8    /  TBili  1.2    /  DBili  x      /  AST  37     /  ALT  16     /  AlkPhos  73     30 Oct 2021 02:26  TPro  5.8    /  Alb  2.8    /  TBili  0.9    /  DBili  x      /  AST  26     /  ALT  8      /  AlkPhos  69     29 Oct 2021 13:17  TPro  5.7    /  Alb  3.0    /  TBili  0.8    /  DBili  x      /  AST  29     /  ALT  9      /  AlkPhos  65     29 Oct 2021 01:21    PT/INR - ( 30 Oct 2021 02:13 )   PT: 12.7 sec;   INR: 1.06 ratio         PTT - ( 30 Oct 2021 02:13 )  PTT:29.0 sec      Blood Culture:         RADIOLOGY:    EKG:    Echo:  < from: Transthoracic Echocardiogram (10.27.21 @ 07:59) >    Patient name: JUANIS DE SANTIAGO  YOB: 1940   Age: 80 (M)   MR#: [id number]  Study Date: 10/27/2021  Location: [address] [place], [place] [id number]: Karley Zaragoza SURENDRA  Study quality: Technically difficult  Referring Physician: Fady Mercer MD  Blood Pressure: 112/45 mmHg  Height: 188 cm  Weight: 95 kg  BSA: 2.2 m2  ------------------------------------------------------------------------  PROCEDURE: Transthoracic echocardiogram with 2-D, M-Mode  and complete spectral and color flow Doppler.  INDICATION: Cardiogenic shock (R57.0)  ------------------------------------------------------------------------  Dimensions:    Normal Values:  LA:     4.4    2.0 - 4.0 cm  Ao:     2.7    2.0 - 3.8 cm  SEPTUM: 0.7    0.6 - 1.2 cm  PWT:    0.9    0.6 -1.1 cm  LVIDd:  5.8    3.0 - 5.6 cm  LVIDs:  4.7    1.8 - 4.0 cm  Derived variables:  LVMI: 79 g/m2  RWT: 0.31  Fractional short: 19 %  EF (Visual Estimate): 30-35 %  Doppler Peak Velocity (m/sec): AoV=1.7  ------------------------------------------------------------------------  Observations:  Mitral Valve: Mitral annular calcification and calcified  mitral leaflets with decreased diastolic opening. Mean  transmitral valve gradient equals 7 mm Hg, consistent with  moderate mitral stenosis. HR 80s  Aortic Valve/Aorta: Transcatheter aortic valve replacement.  Peak transaortic valve gradient equals 12 mm Hg, mean  transaortic valve gradient equals 7 mm Hg, which is  probably normal in the presence of a transcatheter aortic  valve replacement. No aortic valve regurgitation seen.  Aortic Root: 2.7 cm.  Left Atrium: Left atrium not well visualized.  Left Ventricle: Endocardial visualization enhanced with  intravenous injection of Ultrasonic Enhancing Agent  (Definity). Severe global left ventricular systolic  dysfunction. Mild left ventricular enlargement. Unable to  evaluate diastology.  Right Heart: Moderate right atrial enlargement.  A device  wire is noted in the right heart. The right ventricle is  not well visualized; grossly normal right ventricular  systolic function. Normal tricuspid valve. Minimal  tricuspid regurgitation. Pulmonic valve not well  visualized.  Pericardium/Pleura: Normal pericardium with no pericardial  effusion.  Hemodynamic: Estimated right atrial pressure is 8 mm Hg.  Estimated right ventricular systolic pressure equals 42 mm  Hg, assuming right atrial pressure equals 8 mm Hg,  consistent with mild pulmonary hypertension.  ------------------------------------------------------------------------  Conclusions:  1. Mitral annular calcification and calcified mitral  leaflets with decreased diastolic opening. Mean transmitral  valve gradient equals 7 mm Hg, consistent with moderate  mitral stenosis. HR 80s  2. Transcatheter aortic valve replacement. Peaktransaortic  valve gradient equals 12 mm Hg, mean transaortic valve  gradient equals 7 mm Hg, which is probably normal in the  presence of a transcatheter aortic valve replacement. No  aortic valve regurgitation seen.  3. Endocardial visualization enhanced with intravenous  injection of Ultrasonic Enhancing Agent (Definity). Severe  global left ventricular systolic dysfunction.  4. The right ventricle is not well visualized; grossly  normal right ventricular systolic function.  ------------------------------------------------------------------------  Confirmed on  10/27/2021 - 20:25:09 by KRISTI Lares  ------------------------------------------------------------------------    < end of copied text >

## 2021-10-30 NOTE — PROGRESS NOTE ADULT - SUBJECTIVE AND OBJECTIVE BOX
SICU Daily Progress Note  =====================================================  Interval/Overnight Events:     - Jimenez, A-line d/c'ed, KVO; start limited clears   - VTach runs on 10/28. 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 Patient now s/p Whipple procedure.    Allergies:   Dilaudid (Anaphylaxis; Hives)    MEDICATIONS:   --------------------------------------------------------------------------------------  Neurologic Medications  acetaminophen   IVPB .. 1000 milliGRAM(s) IV Intermittent every 6 hours  fentaNYL  PCA (50 MICROgram(s)/mL) Rescue Clinician Bolus 10 MICROGram(s) IV Push every 15 minutes PRN for Pain Scale GREATER THAN 6  fentaNYL PCA (50 MICROgram(s)/mL) 30 milliLiter(s) PCA Continuous PCA Continuous  ondansetron Injectable 4 milliGRAM(s) IV Push every 6 hours PRN Nausea    Respiratory Medications  diphenhydrAMINE Injectable 25 milliGRAM(s) IV Push every 4 hours PRN Pruritus    Cardiovascular Medications  metoprolol tartrate Injectable 5 milliGRAM(s) IV Push every 4 hours    Gastrointestinal Medications  lactated ringers. 1000 milliLiter(s) IV Continuous <Continuous>  pantoprazole  Injectable 40 milliGRAM(s) IV Push every 12 hours  phytonadione  IVPB 10 milliGRAM(s) IV Intermittent daily  potassium chloride  10 mEq/100 mL IVPB 10 milliEquivalent(s) IV Intermittent every 1 hour    Genitourinary Medications    Hematologic/Oncologic Medications  heparin   Injectable 5000 Unit(s) SubCutaneous every 8 hours  influenza   Vaccine 0.5 milliLiter(s) IntraMuscular once    Antimicrobial/Immunologic Medications    Endocrine/Metabolic Medications  insulin lispro (ADMELOG) corrective regimen sliding scale   SubCutaneous three times a day before meals  insulin lispro (ADMELOG) corrective regimen sliding scale   SubCutaneous at bedtime    Topical/Other Medications  chlorhexidine 2% Cloths 1 Application(s) Topical <User Schedule>  dorzolamide 2%/timolol 0.5% Ophthalmic Solution 1 Drop(s) Both EYES two times a day  ketorolac 0.5% Ophthalmic Solution 1 Drop(s) Both EYES daily  latanoprost 0.005% Ophthalmic Solution 1 Drop(s) Both EYES at bedtime  naloxone Injectable 0.1 milliGRAM(s) IV Push every 3 minutes PRN For ANY of the following changes in patient status:  A. RR LESS THAN 10 breaths per minute, B. Oxygen saturation LESS THAN 90%, C. Sedation score of 6    --------------------------------------------------------------------------------------  VITAL SIGNS, INS/OUTS (last 24 hours):  --------------------------------------------------------------------------------------  T(C): 36.7 (10-30-21 @ 03:00), Max: 36.7 (10-29-21 @ 15:00)  HR: 92 (10-30-21 @ 03:00) (84 - 104)  BP: 148/71 (10-30-21 @ 03:00) (127/98 - 164/80)  RR: 18 (10-30-21 @ 03:00) (18 - 29)  SpO2: 89% (10-30-21 @ 03:00) (89% - 100%)    10-28-21 @ 07:01  -  10-29-21 @ 07:00  --------------------------------------------------------  IN: 4474.8 mL / OUT: 1271 mL / NET: 3203.8 mL    10-29-21 @ 07:01  -  10-30-21 @ 04:15  --------------------------------------------------------  IN: 1460 mL / OUT: 685 mL / NET: 775 mL    --------------------------------------------------------------------------------------  EXAM  NEUROLOGY  Exam: Normal, NAD, alert, oriented x3, no focal deficits.    HEENT  Exam: Normocephalic, atraumatic, EOMI.     RESPIRATORY  Exam: Normal expansion/effort.  Mechanical Ventilation:     CARDIOVASCULAR  Exam: Regular rate and rhythm.       GI/NUTRITION  Exam: Abdomen soft, Non-tender, Non-distended.     VASCULAR  Exam: Extremities warm, pink, well-perfused.     MUSCULOSKELETAL  Exam: All extremities moving spontaneously without limitations.     SKIN  Exam: Good skin turgor, no skin breakdown.     LABS  --------------------------------------------------------------------------------------                        7.9    4.73  )-----------( 107      ( 30 Oct 2021 01:32 )             25.7   10-30    135  |  103  |  18  ----------------------------<  118<H>  3.8   |  19<L>  |  0.73    Ca    8.2<L>      30 Oct 2021 02:26  Phos  3.2     10-30  Mg     2.1     10-30    TPro  5.9<L>  /  Alb  2.8<L>  /  TBili  1.2  /  DBili  x   /  AST  37  /  ALT  16  /  AlkPhos  73  10-30  ABG - ( 29 Oct 2021 01:11 )  pH, Arterial: 7.46  pH, Blood: x     /  pCO2: 33    /  pO2: 81    / HCO3: 24    / Base Excess: -0.1  /  SaO2: 96.7      PT/INR - ( 30 Oct 2021 02:13 )   PT: 12.7 sec;   INR: 1.06 ratio    PTT - ( 30 Oct 2021 02:13 )  PTT:29.0 sec  --------------------------------------------------------------------------------------

## 2021-10-31 LAB
ALBUMIN SERPL ELPH-MCNC: 2.8 G/DL — LOW (ref 3.3–5)
ALP SERPL-CCNC: 77 U/L — SIGNIFICANT CHANGE UP (ref 40–120)
ALT FLD-CCNC: 43 U/L — SIGNIFICANT CHANGE UP (ref 10–45)
AMYLASE FLD-CCNC: 5 U/L — SIGNIFICANT CHANGE UP
ANION GAP SERPL CALC-SCNC: 11 MMOL/L — SIGNIFICANT CHANGE UP (ref 5–17)
AST SERPL-CCNC: 65 U/L — HIGH (ref 10–40)
BILIRUB SERPL-MCNC: 0.8 MG/DL — SIGNIFICANT CHANGE UP (ref 0.2–1.2)
BUN SERPL-MCNC: 21 MG/DL — SIGNIFICANT CHANGE UP (ref 7–23)
CALCIUM SERPL-MCNC: 8.1 MG/DL — LOW (ref 8.4–10.5)
CHLORIDE SERPL-SCNC: 105 MMOL/L — SIGNIFICANT CHANGE UP (ref 96–108)
CO2 SERPL-SCNC: 20 MMOL/L — LOW (ref 22–31)
CREAT SERPL-MCNC: 0.73 MG/DL — SIGNIFICANT CHANGE UP (ref 0.5–1.3)
GLUCOSE BLDC GLUCOMTR-MCNC: 110 MG/DL — HIGH (ref 70–99)
GLUCOSE BLDC GLUCOMTR-MCNC: 123 MG/DL — HIGH (ref 70–99)
GLUCOSE BLDC GLUCOMTR-MCNC: 125 MG/DL — HIGH (ref 70–99)
GLUCOSE BLDC GLUCOMTR-MCNC: 126 MG/DL — HIGH (ref 70–99)
GLUCOSE SERPL-MCNC: 108 MG/DL — HIGH (ref 70–99)
HCT VFR BLD CALC: 27.1 % — LOW (ref 39–50)
HGB BLD-MCNC: 8.6 G/DL — LOW (ref 13–17)
MAGNESIUM SERPL-MCNC: 1.7 MG/DL — SIGNIFICANT CHANGE UP (ref 1.6–2.6)
MCHC RBC-ENTMCNC: 31.7 GM/DL — LOW (ref 32–36)
MCHC RBC-ENTMCNC: 32.2 PG — SIGNIFICANT CHANGE UP (ref 27–34)
MCV RBC AUTO: 101.5 FL — HIGH (ref 80–100)
NRBC # BLD: 0 /100 WBCS — SIGNIFICANT CHANGE UP (ref 0–0)
PHOSPHATE SERPL-MCNC: 3.2 MG/DL — SIGNIFICANT CHANGE UP (ref 2.5–4.5)
PLATELET # BLD AUTO: 144 K/UL — LOW (ref 150–400)
POTASSIUM SERPL-MCNC: 3.8 MMOL/L — SIGNIFICANT CHANGE UP (ref 3.5–5.3)
POTASSIUM SERPL-SCNC: 3.8 MMOL/L — SIGNIFICANT CHANGE UP (ref 3.5–5.3)
PROT SERPL-MCNC: 5.7 G/DL — LOW (ref 6–8.3)
RBC # BLD: 2.67 M/UL — LOW (ref 4.2–5.8)
RBC # FLD: 14.7 % — HIGH (ref 10.3–14.5)
SODIUM SERPL-SCNC: 136 MMOL/L — SIGNIFICANT CHANGE UP (ref 135–145)
WBC # BLD: 5.82 K/UL — SIGNIFICANT CHANGE UP (ref 3.8–10.5)
WBC # FLD AUTO: 5.82 K/UL — SIGNIFICANT CHANGE UP (ref 3.8–10.5)

## 2021-10-31 PROCEDURE — 99231 SBSQ HOSP IP/OBS SF/LOW 25: CPT

## 2021-10-31 PROCEDURE — 99233 SBSQ HOSP IP/OBS HIGH 50: CPT

## 2021-10-31 RX ORDER — LANOLIN ALCOHOL/MO/W.PET/CERES
6 CREAM (GRAM) TOPICAL AT BEDTIME
Refills: 0 | Status: DISCONTINUED | OUTPATIENT
Start: 2021-10-31 | End: 2021-11-15

## 2021-10-31 RX ORDER — FENTANYL CITRATE 50 UG/ML
30 INJECTION INTRAVENOUS
Refills: 0 | Status: DISCONTINUED | OUTPATIENT
Start: 2021-10-31 | End: 2021-11-01

## 2021-10-31 RX ORDER — MAGNESIUM SULFATE 500 MG/ML
2 VIAL (ML) INJECTION ONCE
Refills: 0 | Status: COMPLETED | OUTPATIENT
Start: 2021-10-31 | End: 2021-10-31

## 2021-10-31 RX ORDER — LANOLIN ALCOHOL/MO/W.PET/CERES
6 CREAM (GRAM) TOPICAL ONCE
Refills: 0 | Status: COMPLETED | OUTPATIENT
Start: 2021-10-31 | End: 2021-10-31

## 2021-10-31 RX ORDER — POTASSIUM CHLORIDE 20 MEQ
20 PACKET (EA) ORAL ONCE
Refills: 0 | Status: COMPLETED | OUTPATIENT
Start: 2021-10-31 | End: 2021-10-31

## 2021-10-31 RX ORDER — FUROSEMIDE 40 MG
20 TABLET ORAL ONCE
Refills: 0 | Status: COMPLETED | OUTPATIENT
Start: 2021-10-31 | End: 2021-10-31

## 2021-10-31 RX ORDER — PANTOPRAZOLE SODIUM 20 MG/1
40 TABLET, DELAYED RELEASE ORAL
Refills: 0 | Status: DISCONTINUED | OUTPATIENT
Start: 2021-10-31 | End: 2021-11-15

## 2021-10-31 RX ADMIN — Medication 40 MILLIGRAM(S): at 05:06

## 2021-10-31 RX ADMIN — FENTANYL CITRATE 30 MILLILITER(S): 50 INJECTION INTRAVENOUS at 16:55

## 2021-10-31 RX ADMIN — HEPARIN SODIUM 5000 UNIT(S): 5000 INJECTION INTRAVENOUS; SUBCUTANEOUS at 13:32

## 2021-10-31 RX ADMIN — LATANOPROST 1 DROP(S): 0.05 SOLUTION/ DROPS OPHTHALMIC; TOPICAL at 23:28

## 2021-10-31 RX ADMIN — DORZOLAMIDE HYDROCHLORIDE TIMOLOL MALEATE 1 DROP(S): 20; 5 SOLUTION/ DROPS OPHTHALMIC at 18:46

## 2021-10-31 RX ADMIN — Medication 6 MILLIGRAM(S): at 21:06

## 2021-10-31 RX ADMIN — Medication 20 MILLIGRAM(S): at 11:41

## 2021-10-31 RX ADMIN — FENTANYL CITRATE 30 MILLILITER(S): 50 INJECTION INTRAVENOUS at 07:14

## 2021-10-31 RX ADMIN — LIDOCAINE 1 PATCH: 4 CREAM TOPICAL at 07:08

## 2021-10-31 RX ADMIN — Medication 50 GRAM(S): at 11:41

## 2021-10-31 RX ADMIN — FENTANYL CITRATE 10 MICROGRAM(S): 50 INJECTION INTRAVENOUS at 14:33

## 2021-10-31 RX ADMIN — Medication 0.25 MILLIGRAM(S): at 21:06

## 2021-10-31 RX ADMIN — Medication 400 MILLIGRAM(S): at 01:36

## 2021-10-31 RX ADMIN — FENTANYL CITRATE 10 MICROGRAM(S): 50 INJECTION INTRAVENOUS at 19:20

## 2021-10-31 RX ADMIN — HEPARIN SODIUM 5000 UNIT(S): 5000 INJECTION INTRAVENOUS; SUBCUTANEOUS at 21:05

## 2021-10-31 RX ADMIN — Medication 40 MILLIGRAM(S): at 18:46

## 2021-10-31 RX ADMIN — Medication 20 MILLIEQUIVALENT(S): at 11:41

## 2021-10-31 RX ADMIN — Medication 400 MILLIGRAM(S): at 08:29

## 2021-10-31 RX ADMIN — HEPARIN SODIUM 5000 UNIT(S): 5000 INJECTION INTRAVENOUS; SUBCUTANEOUS at 05:07

## 2021-10-31 RX ADMIN — CHLORHEXIDINE GLUCONATE 1 APPLICATION(S): 213 SOLUTION TOPICAL at 05:08

## 2021-10-31 RX ADMIN — Medication 6 MILLIGRAM(S): at 01:45

## 2021-10-31 RX ADMIN — FENTANYL CITRATE 30 MILLILITER(S): 50 INJECTION INTRAVENOUS at 14:18

## 2021-10-31 RX ADMIN — PANTOPRAZOLE SODIUM 40 MILLIGRAM(S): 20 TABLET, DELAYED RELEASE ORAL at 08:29

## 2021-10-31 RX ADMIN — Medication 1 DROP(S): at 22:06

## 2021-10-31 RX ADMIN — DORZOLAMIDE HYDROCHLORIDE TIMOLOL MALEATE 1 DROP(S): 20; 5 SOLUTION/ DROPS OPHTHALMIC at 05:08

## 2021-10-31 RX ADMIN — LIDOCAINE 1 PATCH: 4 CREAM TOPICAL at 09:24

## 2021-10-31 RX ADMIN — Medication 1000 MILLIGRAM(S): at 08:44

## 2021-10-31 NOTE — PROGRESS NOTE ADULT - SUBJECTIVE AND OBJECTIVE BOX
HISTORY:  79 y/o male w/ a PMHx of CAD s/p stents x2 (~15 years ago), HFrEF of ~35% s/p AICD (2005 but extracted & reimplanted in Sept 2021 in the setting of MSSA bacteremia), HTN, HLD, bilateral PE, pancreatic CA (diagnosed Mar 2021) s/p neoadjuvant chemotherapy who presented on  HISTORY:  79 y/o male w/ a PMHx of CAD s/p stents x2 (~15 years ago), HFrEF of ~35% s/p AICD (2005 but extracted & reimplanted in 9/2021 in the setting of MSSA bacteremia), s/p TAVR (4/2021), HTN, HLD, bilateral PEs (7/2021), pancreatic CA (diagnosed 3/2021) s/p neoadjuvant chemotherapy who presented on  HISTORY:  81 y/o male w/ a PMHx of CAD s/p stents x2 (~15 years ago), HFrEF of ~35% s/p AICD (2005 but extracted 9/2021 and reimplanted 10/2021 in the setting of MSSA bacteremia), s/p TAVR (4/2021), HTN, HLD, bilateral PEs (7/2021), MSSA bacteremia secondary to right foot osteomyelitis s/p debridement (9/2021), GERD, BPH, spinal stenosis, macular degeneration, left KARIE c/b periprosthetic fracture s/p ORIF, and pancreatic CA (diagnosed 3/2021) s/p neoadjuvant chemotherapy who presented on 10/26 for a scheduled Whipple. Patient was requiring vasopressor support intra-operatively so he was admitted to SICU for further management post-operatively. Post-op course has been c/b hypovolemic shock, episodes of NSVT, and an allergic reaction to most likely Dilaudid. Patient currently denies headache, dizziness, weakness, fevers, chills, shortness of breath, chest pain, abdominal pain, or nausea/vomiting.    24 HOUR EVENTS:  - Advanced to full liquid diet  - MOR triglyceride low at 39  - Changed metoprolol 5 mg IV q4hrs to home sotalol 40 mg BID  - Restarted home Xanax and melatonin    SUBJECTIVE/ROS: A ten-point review of systems was otherwise negative except as noted.    NEURO  Exam: awake, alert, oriented x4, no acute distress, no acute focal deficits  Meds:  - acetaminophen   IVPB .. 1000 milliGRAM(s) IV Intermittent every 6 hours  - ALPRAZolam 0.25 milliGRAM(s) Oral three times a day PRN Anxiety  - fentaNYL  PCA (50 MICROgram(s)/mL) Rescue Clinician Bolus 10 MICROGram(s) IV Push every 15 minutes PRN for Pain Scale GREATER THAN 6  - fentaNYL PCA (50 MICROgram(s)/mL) 30 milliLiter(s) PCA Continuous PCA Continuous  - lidocaine   4% Patch 1 Patch Transdermal daily  - melatonin 6 milliGRAM(s) Oral at bedtime    RESPIRATORY  RR: 21 (10-31-21 @ 01:00) (14 - 26)  SpO2: 94% (10-31-21 @ 01:00) (91% - 97%)  Exam: clear to auscultation bilaterally    CARDIOVASCULAR  HR: 91 (10-31-21 @ 01:00) (79 - 95)  BP: 145/88 (10-31-21 @ 01:00) (142/66 - 176/79)  BP(mean): 111 (10-31-21 @ 01:00) (95 - 120)  Exam: regular rate and rhythm  Cardiac Rhythm: sinus  Perfusion    [x]Adequate    [ ]Inadequate  Mentation   [x]Normal       [ ]Reduced  Extremities  [x]Warm         [ ]Cool  Volume Status [ ]Hypervolemic [x]Euvolemic [ ]Hypovolemic  Meds: sotalol 40 milliGRAM(s) Oral every 12 hours    GI/NUTRITION  Exam: softly distended, monse-incisional tenderness, Prevena VAC over incision w/ no drainage, surgical drain output serosanguineous  Diet: full liquid  Meds:  - ondansetron Injectable 4 milliGRAM(s) IV Push every 6 hours PRN Nausea  - pantoprazole  Injectable 40 milliGRAM(s) IV Push every 12 hours    GENITOURINARY  I&O's Detail  10-30 @ 07:01  -  10-31 @ 03:21  --------------------------------------------------------  IN:    IV PiggyBack: 250 mL    Lactated Ringers: 355 mL  Total IN: 605 mL    OUT:    Bulb (mL): 360 mL    Voided (mL): 400 mL  Total OUT: 760 mL    Total NET: -155 mL    135  |  105  |  23  ----------------------------<  129<H>  4.2   |  20<L>  |  0.82    Ca    8.4      30 Oct 2021 22:28  Phos  3.4  Mg     2.0  TPro  6.2  /  Alb  3.0<L>  /  TBili  1.1  /  DBili  x   /  AST  117<H>  /  ALT  56<H>  /  AlkPhos  77    Meds: lactated ringers infuse at 20 mL/hr    HEMATOLOGIC  Meds: heparin   Injectable 5000 Unit(s) SubCutaneous every 8 hours                        8.6    5.53  )-----------( 142      ( 30 Oct 2021 22:28 )             27.5     PT/INR - ( 30 Oct 2021 22:28 )   PT: 12.8 sec;   INR: 1.07 ratio    PTT - ( 30 Oct 2021 22:28 )  PTT:27.6 sec    INFECTIOUS DISEASES  T(C): 36.6 (10-30-21 @ 23:00), Max: 36.7 (10-30-21 @ 15:00)  WBC Count:  - 5.53 K/uL (10-30 @ 22:28)  - 5.82 K/uL (10-30 @ 14:56)    ENDOCRINE  Capillary Blood Glucose:  - 130 mg/dL (30 Oct 2021 22:53)  - 104 mg/dL (30 Oct 2021 13:01)  - 100 mg/dL (30 Oct 2021 07:01)  Meds:  - insulin lispro (ADMELOG) corrective regimen sliding scale   SubCutaneous three times a day before meals  - insulin lispro (ADMELOG) corrective regimen sliding scale   SubCutaneous at bedtime    ACCESS DEVICES:  [x] Peripheral IV  [ ] Central Venous Line	[ ] R	[ ] L	[ ] IJ	[ ] Fem	[ ] SC	Placed:   [ ] Arterial Line		[ ] R	[ ] L	[ ] Fem	[ ] Rad	[ ] Ax	Placed:   [x] PICC:	 RUE placed 9/22			[ ] Mediport  [ ] Urinary Catheter, Date Placed:   [x] Necessity of urinary, arterial, and venous catheters discussed    OTHER MEDICATIONS:  - chlorhexidine 2% Cloths 1 Application(s) Topical <User Schedule>  - diphenhydrAMINE Injectable 25 milliGRAM(s) IV Push every 4 hours PRN Pruritus  - dorzolamide 2%/timolol 0.5% Ophthalmic Solution 1 Drop(s) Both EYES two times a day  - ketorolac 0.5% Ophthalmic Solution 1 Drop(s) Both EYES daily  - latanoprost 0.005% Ophthalmic Solution 1 Drop(s) Both EYES at bedtime  - naloxone Injectable 0.1 milliGRAM(s) IV Push every 3 minutes PRN    IMAGING: HISTORY:  81 y/o male w/ a PMHx of CAD s/p stents x2 (~15 years ago), HFrEF of ~35% s/p AICD (2005 but extracted 9/2021 and reimplanted 10/2021 in the setting of MSSA bacteremia), s/p TAVR (4/2021), HTN, HLD, bilateral PEs (7/2021 s/p Xarelto), MSSA bacteremia secondary to right foot osteomyelitis s/p debridement (9/2021), GERD, BPH, anxiety, insomnia, spinal stenosis, macular degeneration, left KARIE w/ revision x2, and pancreatic CA (diagnosed 3/2021) s/p neoadjuvant chemotherapy who presented on 10/26 for a scheduled Whipple. Patient was requiring vasopressor support intra-operatively so he was admitted to SICU for further management post-operatively. Post-op course has been c/b hypovolemic shock, episodes of NSVT, and an allergic reaction to most likely Dilaudid. Patient currently denies headache, dizziness, weakness, fevers, chills, shortness of breath, chest pain, abdominal pain, or nausea/vomiting.    24 HOUR EVENTS:  - Advanced to full liquid diet  - MOR triglyceride low at 39  - Changed metoprolol 5 mg IV q4hrs to home sotalol 40 mg BID  - Restarted home Xanax and melatonin    SUBJECTIVE/ROS: A ten-point review of systems was otherwise negative except as noted.    NEURO  Exam: awake, alert, oriented x4, no acute distress, no acute focal deficits  Meds:  - acetaminophen   IVPB .. 1000 milliGRAM(s) IV Intermittent every 6 hours  - ALPRAZolam 0.25 milliGRAM(s) Oral three times a day PRN Anxiety  - fentaNYL  PCA (50 MICROgram(s)/mL) Rescue Clinician Bolus 10 MICROGram(s) IV Push every 15 minutes PRN for Pain Scale GREATER THAN 6  - fentaNYL PCA (50 MICROgram(s)/mL) 30 milliLiter(s) PCA Continuous PCA Continuous  - lidocaine   4% Patch 1 Patch Transdermal daily  - melatonin 6 milliGRAM(s) Oral at bedtime    RESPIRATORY  RR: 21 (10-31-21 @ 01:00) (14 - 26)  SpO2: 94% (10-31-21 @ 01:00) (91% - 97%)  Exam: clear to auscultation bilaterally    CARDIOVASCULAR  HR: 91 (10-31-21 @ 01:00) (79 - 95)  BP: 145/88 (10-31-21 @ 01:00) (142/66 - 176/79)  BP(mean): 111 (10-31-21 @ 01:00) (95 - 120)  Exam: regular rate and rhythm  Cardiac Rhythm: sinus  Perfusion    [x]Adequate    [ ]Inadequate  Mentation   [x]Normal       [ ]Reduced  Extremities  [x]Warm         [ ]Cool  Volume Status [ ]Hypervolemic [x]Euvolemic [ ]Hypovolemic  Meds: sotalol 40 milliGRAM(s) Oral every 12 hours    GI/NUTRITION  Exam: softly distended, monse-incisional tenderness, Prevena VAC over incision w/ no drainage, surgical drain output serosanguineous  Diet: full liquid  Meds:  - ondansetron Injectable 4 milliGRAM(s) IV Push every 6 hours PRN Nausea  - pantoprazole  Injectable 40 milliGRAM(s) IV Push every 12 hours    GENITOURINARY  I&O's Detail  10-30 @ 07:01  -  10-31 @ 03:21  --------------------------------------------------------  IN:    IV PiggyBack: 250 mL    Lactated Ringers: 355 mL  Total IN: 605 mL    OUT:    Bulb (mL): 360 mL    Voided (mL): 400 mL  Total OUT: 760 mL    Total NET: -155 mL    135  |  105  |  23  ----------------------------<  129<H>  4.2   |  20<L>  |  0.82    Ca    8.4      30 Oct 2021 22:28  Phos  3.4  Mg     2.0  TPro  6.2  /  Alb  3.0<L>  /  TBili  1.1  /  DBili  x   /  AST  117<H>  /  ALT  56<H>  /  AlkPhos  77    Meds: lactated ringers infuse at 20 mL/hr    HEMATOLOGIC  Meds: heparin   Injectable 5000 Unit(s) SubCutaneous every 8 hours                        8.6    5.53  )-----------( 142      ( 30 Oct 2021 22:28 )             27.5     PT/INR - ( 30 Oct 2021 22:28 )   PT: 12.8 sec;   INR: 1.07 ratio    PTT - ( 30 Oct 2021 22:28 )  PTT:27.6 sec    INFECTIOUS DISEASES  T(C): 36.6 (10-30-21 @ 23:00), Max: 36.7 (10-30-21 @ 15:00)  WBC Count:  - 5.53 K/uL (10-30 @ 22:28)  - 5.82 K/uL (10-30 @ 14:56)    ENDOCRINE  Capillary Blood Glucose:  - 130 mg/dL (30 Oct 2021 22:53)  - 104 mg/dL (30 Oct 2021 13:01)  - 100 mg/dL (30 Oct 2021 07:01)  Meds:  - insulin lispro (ADMELOG) corrective regimen sliding scale   SubCutaneous three times a day before meals  - insulin lispro (ADMELOG) corrective regimen sliding scale   SubCutaneous at bedtime    ACCESS DEVICES:  [x] Peripheral IV  [ ] Central Venous Line	[ ] R	[ ] L	[ ] IJ	[ ] Fem	[ ] SC	Placed:   [ ] Arterial Line		[ ] R	[ ] L	[ ] Fem	[ ] Rad	[ ] Ax	Placed:   [x] PICC:	 RUE placed 9/22			[ ] Mediport  [ ] Urinary Catheter, Date Placed:   [x] Necessity of urinary, arterial, and venous catheters discussed    OTHER MEDICATIONS:  - chlorhexidine 2% Cloths 1 Application(s) Topical <User Schedule>  - diphenhydrAMINE Injectable 25 milliGRAM(s) IV Push every 4 hours PRN Pruritus  - dorzolamide 2%/timolol 0.5% Ophthalmic Solution 1 Drop(s) Both EYES two times a day  - ketorolac 0.5% Ophthalmic Solution 1 Drop(s) Both EYES daily  - latanoprost 0.005% Ophthalmic Solution 1 Drop(s) Both EYES at bedtime  - naloxone Injectable 0.1 milliGRAM(s) IV Push every 3 minutes PRN    IMAGING:

## 2021-10-31 NOTE — PROGRESS NOTE ADULT - ASSESSMENT
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 He required icd extraction, and a subq icd was replaced. He is now s/p Whipple procedure     now off of pressor support with improved BP  - yesterday was noted to be having runs of NSVT, up to 14 beats.  A few strips seemed to be consistent with AF  - was transitioned from iv metoprolol to his usual low dose of sotalol, and his ectopy has improved    - Keep K greater than 4 and Mg greater than 2    - known history of systolic hf (mod lv dysfunction and mod ms)  - echo 10/27 with moderate ms at HR 80, tavr in place. severe LV dysfunction  - remains compensated from HF POV.   - entresto remains on hold, and would consider resuming it, noting that his bp is improved     - history of PE in 7/2021, and had been on xarelto.  is at elevated risk of vte noting pancreatic malignancy and relatively recent pe  - resume ac when safe from a surgical perspective. is presently on dvt ppx sq heparin    - Other cardiovascular workup will depend on clinical course.  - will follow with you

## 2021-10-31 NOTE — PROGRESS NOTE ADULT - ASSESSMENT
81 y/o male w/ a PMHx of CAD s/p stents x2, HFrEF of ~35% s/p AICD, s/p TAVR, HTN, HLD, bilateral PEs, MSSA bacteremia secondary to right foot osteomyelitis s/p debridement, GERD, BPH, spinal stenosis, macular degeneration, left KARIE c/b periprosthetic fracture s/p ORIF, and pancreatic CA s/p neoadjuvant chemotherapy presenting s/p Whipple w/ a post-op course c/b hypovolemic shock, episodes of NSVT, and an allergic reaction to most likely Dilaudid    PLAN:    Neuro: acute post-op pain  - Multimodal pain control with    Resp:   - Out of bed to chair, ambulate as tolerated, and incentive spirometry to prevent atelectasis  - Mechanical ventilation    CV:  - Monitor vital signs    GI:   -   - Bowel regimen with senna & Miralax  - Protonix for stress ulcer prophylaxis    Renal:  - Monitor I&Os and electrolytes w/ repletions as necessary    Heme:  - Monitor CBC and coags  - Lovenox for VTE prophylaxis    ID:   - Monitor for clinical evidence of active infection  - Monitor WBC, temperature, and procalcitonin  - Empiric antibiotics    Endo:   - Monitor glucose ; HgbA1C  - for HLD  - for hypothyroidism    Code Status:    Disposition:    India Collier PA-C     [id number] 79 y/o male w/ a PMHx of CAD s/p stents x2, HFrEF of ~35% s/p AICD, s/p TAVR, HTN, HLD, bilateral PEs, MSSA bacteremia secondary to right foot osteomyelitis s/p debridement, GERD, BPH, anxiety, insomnia, spinal stenosis, macular degeneration, left KARIE w/ revision x2, and pancreatic CA s/p neoadjuvant chemotherapy presenting s/p Whipple w/ a post-op course c/b hypovolemic shock, episodes of NSVT, and an allergic reaction to most likely Dilaudid    PLAN:    Neuro: acute post-op pain, anxiety, insomnia  - Multimodal pain control with lidocaine patch, IV acetaminophen, and a fentanyl PCA  - Home Xanax PRN anxiety  - Home melatonin for insomnia    Resp: no acute issues  - Out of bed to chair, ambulate as tolerated, and incentive spirometry to prevent atelectasis    CV: CAD, HFrEF, s/p TAVR, HTN, hypovolemic shock (resolved), episodes of NSVT  - Monitor vital signs    GI: s/p Whipple, GERD  - Full liquid diet; will advance diet as per primary team/Whipple pathway  - Protonix BID for bloody NGT output on POD #1 but can likely downgrade to daily  - Will continue to monitor drain output and daily drain amylase levels   - Zofran PRN nausea/vomiting    Renal: BPH  - Monitor I&Os and electrolytes w/ repletions as necessary  - LR at 20 mL/hr as KVO for fentanyl PCA    Heme: no acute issues  - SQH for VTE prophylaxis    ID: no acute issues  - Monitor for clinical evidence of active infection    Endo: HLD  - Monitor glucose w/ ISS before meals & at bedtime for glycemic control; HgbA1C 5.3% on 9/22    Misc: allergic reaction  - Will need outpatient f/u with allergy medicine to determine cause of allergic reaction post-operatively    Code Status: Full code    Disposition: Will remain in SICU    India Collier PA-C     [id number]

## 2021-10-31 NOTE — PROGRESS NOTE ADULT - ATTENDING COMMENTS
79 yo m, s/p Caden with hypovolemic shock, now resolved.  - Multimodal pain management.  - DVT ppx SCDs/SQH.  - Continue per tiana protocol.

## 2021-10-31 NOTE — PROGRESS NOTE ADULT - TIME BILLING
Evaluation and management of s/p Whipple and hypovolemic shock.
Evaluation and management of s/p Whipple.
Evaluation and management s/p Whipple and hypovolemic shock.
Evaluation and management of s/p Whipple and hypovolemic shock.

## 2021-10-31 NOTE — PROGRESS NOTE ADULT - SUBJECTIVE AND OBJECTIVE BOX
[facility]    Milo Thomas, Adolfo, Binta, Brittanie, Arian, Javier      [phone]    CHIEF COMPLAINT: Patient is a 80y old  Male who presents with a chief complaint of post-whipple care (29 Oct 2021 01:34)      Follow Up: psancr adenoca, cad, cmy, vt, pe    Interim history: The patient reports no new symptoms.  Denies chest discomfort and shortness of breath.  Improving abdominal pain.  No new neurologic symptoms.      MEDICATIONS  (STANDING):  acetaminophen   IVPB .. 1000 milliGRAM(s) IV Intermittent every 6 hours  chlorhexidine 2% Cloths 1 Application(s) Topical <User Schedule>  dorzolamide 2%/timolol 0.5% Ophthalmic Solution 1 Drop(s) Both EYES two times a day  fentaNYL PCA (50 MICROgram(s)/mL) 30 milliLiter(s) PCA Continuous PCA Continuous  heparin   Injectable 5000 Unit(s) SubCutaneous every 8 hours  influenza   Vaccine 0.5 milliLiter(s) IntraMuscular once  insulin lispro (ADMELOG) corrective regimen sliding scale   SubCutaneous three times a day before meals  insulin lispro (ADMELOG) corrective regimen sliding scale   SubCutaneous at bedtime  ketorolac 0.5% Ophthalmic Solution 1 Drop(s) Both EYES daily  lactated ringers. 1000 milliLiter(s) (20 mL/Hr) IV Continuous <Continuous>  latanoprost 0.005% Ophthalmic Solution 1 Drop(s) Both EYES at bedtime  lidocaine   4% Patch 1 Patch Transdermal daily  melatonin 6 milliGRAM(s) Oral at bedtime  pantoprazole    Tablet 40 milliGRAM(s) Oral before breakfast  sotalol 40 milliGRAM(s) Oral every 12 hours    MEDICATIONS  (PRN):  ALPRAZolam 0.25 milliGRAM(s) Oral three times a day PRN Anxiety  diphenhydrAMINE Injectable 25 milliGRAM(s) IV Push every 4 hours PRN Pruritus  fentaNYL  PCA (50 MICROgram(s)/mL) Rescue Clinician Bolus 10 MICROGram(s) IV Push every 15 minutes PRN for Pain Scale GREATER THAN 6  naloxone Injectable 0.1 milliGRAM(s) IV Push every 3 minutes PRN For ANY of the following changes in patient status:  A. RR LESS THAN 10 breaths per minute, B. Oxygen saturation LESS THAN 90%, C. Sedation score of 6  ondansetron Injectable 4 milliGRAM(s) IV Push every 6 hours PRN Nausea      REVIEW OF SYSTEMS:  eye, ent, GI, , allergic, dermatologic, musculoskeletal and neurologic are negative except as described above    Vital Signs Last 24 Hrs  T(C): 36.6 (31 Oct 2021 03:00), Max: 36.7 (30 Oct 2021 15:00)  T(F): 97.9 (31 Oct 2021 03:00), Max: 98.1 (30 Oct 2021 15:00)  HR: 82 (31 Oct 2021 06:00) (79 - 95)  BP: 158/88 (31 Oct 2021 06:00) (124/78 - 176/79)  BP(mean): 115 (31 Oct 2021 06:00) (94 - 121)  RR: 17 (31 Oct 2021 06:00) (14 - 26)  SpO2: 94% (31 Oct 2021 06:00) (91% - 97%)    I&O's Summary    29 Oct 2021 07:01  -  30 Oct 2021 07:00  --------------------------------------------------------  IN: 1700 mL / OUT: 835 mL / NET: 865 mL    30 Oct 2021 07:01  -  31 Oct 2021 06:38  --------------------------------------------------------  IN: 825 mL / OUT: 1315 mL / NET: -490 mL        Telemetry past 24h: sr brief wct 4 beats    PHYSICAL EXAM:    Constitutional: well-nourished, well-developed, NAD   HEENT:  MMM, sclerae anicteric, conjunctivae clear, no oral cyanosis.  Pulmonary: Non-labored, breath sounds are clear bilaterally, No wheezing, rales or rhonchi  Cardiovascular: Regular, S1 and S2.  No murmur.  No rubs, gallops or clicks  Gastrointestinal: Bowel Sounds present, soft, mildly tender.   Lymph: No peripheral edema.   Neurological: Alert, no focal deficits  Skin: No rashes.  Psych:  Mood & affect appropriate    LABS: All Labs Reviewed:                        8.6    5.53  )-----------( 142      ( 30 Oct 2021 22:28 )             27.5                         8.8    5.82  )-----------( 130      ( 30 Oct 2021 14:56 )             27.5                         7.9    4.73  )-----------( 107      ( 30 Oct 2021 01:32 )             25.7     30 Oct 2021 22:28    135    |  105    |  23     ----------------------------<  129    4.2     |  20     |  0.82   30 Oct 2021 14:56    134    |  101    |  21     ----------------------------<  112    4.1     |  19     |  0.78   30 Oct 2021 02:26    135    |  103    |  18     ----------------------------<  118    3.8     |  19     |  0.73     Ca    8.4        30 Oct 2021 22:28  Ca    8.5        30 Oct 2021 14:56  Ca    8.2        30 Oct 2021 02:26  Phos  3.4       30 Oct 2021 22:28  Phos  3.4       30 Oct 2021 14:56  Phos  3.2       30 Oct 2021 02:26  Mg     2.0       30 Oct 2021 22:28  Mg     2.0       30 Oct 2021 14:56  Mg     2.1       30 Oct 2021 02:26    TPro  6.2    /  Alb  3.0    /  TBili  1.1    /  DBili  x      /  AST  117    /  ALT  56     /  AlkPhos  77     30 Oct 2021 22:28  TPro  6.0    /  Alb  2.9    /  TBili  1.1    /  DBili  x      /  AST  56     /  ALT  28     /  AlkPhos  75     30 Oct 2021 14:56  TPro  5.9    /  Alb  2.8    /  TBili  1.2    /  DBili  x      /  AST  37     /  ALT  16     /  AlkPhos  73     30 Oct 2021 02:26    PT/INR - ( 30 Oct 2021 22:28 )   PT: 12.8 sec;   INR: 1.07 ratio         PTT - ( 30 Oct 2021 22:28 )  PTT:27.6 sec      Blood Culture:         RADIOLOGY:    EKG:    Echo:

## 2021-11-01 LAB
ALBUMIN SERPL ELPH-MCNC: 2.8 G/DL — LOW (ref 3.3–5)
ALBUMIN SERPL ELPH-MCNC: 2.9 G/DL — LOW (ref 3.3–5)
ALBUMIN SERPL ELPH-MCNC: 2.9 G/DL — LOW (ref 3.3–5)
ALP SERPL-CCNC: 86 U/L — SIGNIFICANT CHANGE UP (ref 40–120)
ALP SERPL-CCNC: 91 U/L — SIGNIFICANT CHANGE UP (ref 40–120)
ALP SERPL-CCNC: 96 U/L — SIGNIFICANT CHANGE UP (ref 40–120)
ALT FLD-CCNC: 33 U/L — SIGNIFICANT CHANGE UP (ref 10–45)
ALT FLD-CCNC: 35 U/L — SIGNIFICANT CHANGE UP (ref 10–45)
ALT FLD-CCNC: 38 U/L — SIGNIFICANT CHANGE UP (ref 10–45)
AMYLASE P1 CFR SERPL: 13 U/L — LOW (ref 25–125)
ANION GAP SERPL CALC-SCNC: 10 MMOL/L — SIGNIFICANT CHANGE UP (ref 5–17)
ANION GAP SERPL CALC-SCNC: 12 MMOL/L — SIGNIFICANT CHANGE UP (ref 5–17)
ANION GAP SERPL CALC-SCNC: 13 MMOL/L — SIGNIFICANT CHANGE UP (ref 5–17)
ANISOCYTOSIS BLD QL: SIGNIFICANT CHANGE UP
APTT BLD: 25 SEC — LOW (ref 27.5–35.5)
APTT BLD: 25.8 SEC — LOW (ref 27.5–35.5)
APTT BLD: 26.8 SEC — LOW (ref 27.5–35.5)
AST SERPL-CCNC: 39 U/L — SIGNIFICANT CHANGE UP (ref 10–40)
AST SERPL-CCNC: 47 U/L — HIGH (ref 10–40)
AST SERPL-CCNC: 50 U/L — HIGH (ref 10–40)
BASE EXCESS BLDV CALC-SCNC: -0.5 MMOL/L — SIGNIFICANT CHANGE UP (ref -2–2)
BASOPHILS # BLD AUTO: 0 K/UL — SIGNIFICANT CHANGE UP (ref 0–0.2)
BASOPHILS NFR BLD AUTO: 0 % — SIGNIFICANT CHANGE UP (ref 0–2)
BILIRUB SERPL-MCNC: 0.7 MG/DL — SIGNIFICANT CHANGE UP (ref 0.2–1.2)
BILIRUB SERPL-MCNC: 0.7 MG/DL — SIGNIFICANT CHANGE UP (ref 0.2–1.2)
BILIRUB SERPL-MCNC: 0.8 MG/DL — SIGNIFICANT CHANGE UP (ref 0.2–1.2)
BLD GP AB SCN SERPL QL: NEGATIVE — SIGNIFICANT CHANGE UP
BUN SERPL-MCNC: 15 MG/DL — SIGNIFICANT CHANGE UP (ref 7–23)
BUN SERPL-MCNC: 16 MG/DL — SIGNIFICANT CHANGE UP (ref 7–23)
BUN SERPL-MCNC: 17 MG/DL — SIGNIFICANT CHANGE UP (ref 7–23)
CA-I SERPL-SCNC: 1.16 MMOL/L — SIGNIFICANT CHANGE UP (ref 1.15–1.33)
CALCIUM SERPL-MCNC: 8 MG/DL — LOW (ref 8.4–10.5)
CALCIUM SERPL-MCNC: 8.1 MG/DL — LOW (ref 8.4–10.5)
CALCIUM SERPL-MCNC: 8.3 MG/DL — LOW (ref 8.4–10.5)
CHLORIDE BLDV-SCNC: 103 MMOL/L — SIGNIFICANT CHANGE UP (ref 96–108)
CHLORIDE SERPL-SCNC: 100 MMOL/L — SIGNIFICANT CHANGE UP (ref 96–108)
CHLORIDE SERPL-SCNC: 101 MMOL/L — SIGNIFICANT CHANGE UP (ref 96–108)
CHLORIDE SERPL-SCNC: 103 MMOL/L — SIGNIFICANT CHANGE UP (ref 96–108)
CO2 BLDV-SCNC: 25 MMOL/L — SIGNIFICANT CHANGE UP (ref 22–26)
CO2 SERPL-SCNC: 22 MMOL/L — SIGNIFICANT CHANGE UP (ref 22–31)
CO2 SERPL-SCNC: 23 MMOL/L — SIGNIFICANT CHANGE UP (ref 22–31)
CO2 SERPL-SCNC: 23 MMOL/L — SIGNIFICANT CHANGE UP (ref 22–31)
CREAT SERPL-MCNC: 0.68 MG/DL — SIGNIFICANT CHANGE UP (ref 0.5–1.3)
CREAT SERPL-MCNC: 0.7 MG/DL — SIGNIFICANT CHANGE UP (ref 0.5–1.3)
CREAT SERPL-MCNC: 0.73 MG/DL — SIGNIFICANT CHANGE UP (ref 0.5–1.3)
DACRYOCYTES BLD QL SMEAR: SLIGHT — SIGNIFICANT CHANGE UP
ELLIPTOCYTES BLD QL SMEAR: SLIGHT — SIGNIFICANT CHANGE UP
EOSINOPHIL # BLD AUTO: 0.12 K/UL — SIGNIFICANT CHANGE UP (ref 0–0.5)
EOSINOPHIL NFR BLD AUTO: 1.8 % — SIGNIFICANT CHANGE UP (ref 0–6)
GAS PNL BLDV: 135 MMOL/L — LOW (ref 136–145)
GAS PNL BLDV: SIGNIFICANT CHANGE UP
GAS PNL BLDV: SIGNIFICANT CHANGE UP
GLUCOSE BLDC GLUCOMTR-MCNC: 102 MG/DL — HIGH (ref 70–99)
GLUCOSE BLDC GLUCOMTR-MCNC: 106 MG/DL — HIGH (ref 70–99)
GLUCOSE BLDC GLUCOMTR-MCNC: 107 MG/DL — HIGH (ref 70–99)
GLUCOSE BLDV-MCNC: 147 MG/DL — HIGH (ref 70–99)
GLUCOSE SERPL-MCNC: 103 MG/DL — HIGH (ref 70–99)
GLUCOSE SERPL-MCNC: 115 MG/DL — HIGH (ref 70–99)
GLUCOSE SERPL-MCNC: 144 MG/DL — HIGH (ref 70–99)
HCO3 BLDV-SCNC: 24 MMOL/L — SIGNIFICANT CHANGE UP (ref 22–29)
HCT VFR BLD CALC: 25.8 % — LOW (ref 39–50)
HCT VFR BLD CALC: 26.8 % — LOW (ref 39–50)
HCT VFR BLD CALC: 27.5 % — LOW (ref 39–50)
HCT VFR BLDA CALC: 27 % — LOW (ref 39–51)
HGB BLD CALC-MCNC: 9 G/DL — LOW (ref 12.6–17.4)
HGB BLD-MCNC: 8.2 G/DL — LOW (ref 13–17)
HGB BLD-MCNC: 8.5 G/DL — LOW (ref 13–17)
HGB BLD-MCNC: 9.1 G/DL — LOW (ref 13–17)
HOROWITZ INDEX BLDV+IHG-RTO: 21 — SIGNIFICANT CHANGE UP
HYPOCHROMIA BLD QL: SLIGHT — SIGNIFICANT CHANGE UP
INR BLD: 1.04 RATIO — SIGNIFICANT CHANGE UP (ref 0.88–1.16)
INR BLD: 1.1 RATIO — SIGNIFICANT CHANGE UP (ref 0.88–1.16)
INR BLD: 1.11 RATIO — SIGNIFICANT CHANGE UP (ref 0.88–1.16)
LACTATE BLDV-MCNC: 1.8 MMOL/L — SIGNIFICANT CHANGE UP (ref 0.7–2)
LYMPHOCYTES # BLD AUTO: 0.35 K/UL — LOW (ref 1–3.3)
LYMPHOCYTES # BLD AUTO: 5.2 % — LOW (ref 13–44)
MACROCYTES BLD QL: SIGNIFICANT CHANGE UP
MAGNESIUM SERPL-MCNC: 1.6 MG/DL — SIGNIFICANT CHANGE UP (ref 1.6–2.6)
MAGNESIUM SERPL-MCNC: 1.8 MG/DL — SIGNIFICANT CHANGE UP (ref 1.6–2.6)
MAGNESIUM SERPL-MCNC: 2.1 MG/DL — SIGNIFICANT CHANGE UP (ref 1.6–2.6)
MANUAL SMEAR VERIFICATION: SIGNIFICANT CHANGE UP
MCHC RBC-ENTMCNC: 31.7 GM/DL — LOW (ref 32–36)
MCHC RBC-ENTMCNC: 31.8 GM/DL — LOW (ref 32–36)
MCHC RBC-ENTMCNC: 31.8 PG — SIGNIFICANT CHANGE UP (ref 27–34)
MCHC RBC-ENTMCNC: 32 PG — SIGNIFICANT CHANGE UP (ref 27–34)
MCHC RBC-ENTMCNC: 32.6 PG — SIGNIFICANT CHANGE UP (ref 27–34)
MCHC RBC-ENTMCNC: 33.1 GM/DL — SIGNIFICANT CHANGE UP (ref 32–36)
MCV RBC AUTO: 100.4 FL — HIGH (ref 80–100)
MCV RBC AUTO: 100.8 FL — HIGH (ref 80–100)
MCV RBC AUTO: 98.6 FL — SIGNIFICANT CHANGE UP (ref 80–100)
MONOCYTES # BLD AUTO: 0.53 K/UL — SIGNIFICANT CHANGE UP (ref 0–0.9)
MONOCYTES NFR BLD AUTO: 7.8 % — SIGNIFICANT CHANGE UP (ref 2–14)
NEUTROPHILS # BLD AUTO: 5.78 K/UL — SIGNIFICANT CHANGE UP (ref 1.8–7.4)
NEUTROPHILS NFR BLD AUTO: 85.2 % — HIGH (ref 43–77)
NRBC # BLD: 0 /100 WBCS — SIGNIFICANT CHANGE UP (ref 0–0)
NRBC # BLD: 0 /100 WBCS — SIGNIFICANT CHANGE UP (ref 0–0)
PCO2 BLDV: 39 MMHG — LOW (ref 42–55)
PH BLDV: 7.4 — SIGNIFICANT CHANGE UP (ref 7.32–7.43)
PHOSPHATE SERPL-MCNC: 3.3 MG/DL — SIGNIFICANT CHANGE UP (ref 2.5–4.5)
PHOSPHATE SERPL-MCNC: 3.4 MG/DL — SIGNIFICANT CHANGE UP (ref 2.5–4.5)
PHOSPHATE SERPL-MCNC: 3.6 MG/DL — SIGNIFICANT CHANGE UP (ref 2.5–4.5)
PLAT MORPH BLD: NORMAL — SIGNIFICANT CHANGE UP
PLATELET # BLD AUTO: 188 K/UL — SIGNIFICANT CHANGE UP (ref 150–400)
PLATELET # BLD AUTO: 195 K/UL — SIGNIFICANT CHANGE UP (ref 150–400)
PLATELET # BLD AUTO: 205 K/UL — SIGNIFICANT CHANGE UP (ref 150–400)
PO2 BLDV: 36 MMHG — SIGNIFICANT CHANGE UP (ref 25–45)
POLYCHROMASIA BLD QL SMEAR: SLIGHT — SIGNIFICANT CHANGE UP
POTASSIUM BLDV-SCNC: 4 MMOL/L — SIGNIFICANT CHANGE UP (ref 3.5–5.1)
POTASSIUM SERPL-MCNC: 3.9 MMOL/L — SIGNIFICANT CHANGE UP (ref 3.5–5.3)
POTASSIUM SERPL-MCNC: 4.1 MMOL/L — SIGNIFICANT CHANGE UP (ref 3.5–5.3)
POTASSIUM SERPL-MCNC: 4.2 MMOL/L — SIGNIFICANT CHANGE UP (ref 3.5–5.3)
POTASSIUM SERPL-SCNC: 3.9 MMOL/L — SIGNIFICANT CHANGE UP (ref 3.5–5.3)
POTASSIUM SERPL-SCNC: 4.1 MMOL/L — SIGNIFICANT CHANGE UP (ref 3.5–5.3)
POTASSIUM SERPL-SCNC: 4.2 MMOL/L — SIGNIFICANT CHANGE UP (ref 3.5–5.3)
PROT SERPL-MCNC: 5.7 G/DL — LOW (ref 6–8.3)
PROT SERPL-MCNC: 6 G/DL — SIGNIFICANT CHANGE UP (ref 6–8.3)
PROT SERPL-MCNC: 6.1 G/DL — SIGNIFICANT CHANGE UP (ref 6–8.3)
PROTHROM AB SERPL-ACNC: 12.5 SEC — SIGNIFICANT CHANGE UP (ref 10.6–13.6)
PROTHROM AB SERPL-ACNC: 13.1 SEC — SIGNIFICANT CHANGE UP (ref 10.6–13.6)
PROTHROM AB SERPL-ACNC: 13.3 SEC — SIGNIFICANT CHANGE UP (ref 10.6–13.6)
RBC # BLD: 2.56 M/UL — LOW (ref 4.2–5.8)
RBC # BLD: 2.67 M/UL — LOW (ref 4.2–5.8)
RBC # BLD: 2.79 M/UL — LOW (ref 4.2–5.8)
RBC # FLD: 14.6 % — HIGH (ref 10.3–14.5)
RBC # FLD: 14.6 % — HIGH (ref 10.3–14.5)
RBC # FLD: 14.7 % — HIGH (ref 10.3–14.5)
RBC BLD AUTO: ABNORMAL
RH IG SCN BLD-IMP: POSITIVE — SIGNIFICANT CHANGE UP
SAO2 % BLDV: 54.3 % — LOW (ref 67–88)
SODIUM SERPL-SCNC: 135 MMOL/L — SIGNIFICANT CHANGE UP (ref 135–145)
SODIUM SERPL-SCNC: 136 MMOL/L — SIGNIFICANT CHANGE UP (ref 135–145)
SODIUM SERPL-SCNC: 136 MMOL/L — SIGNIFICANT CHANGE UP (ref 135–145)
TROPONIN T, HIGH SENSITIVITY RESULT: 84 NG/L — HIGH (ref 0–51)
TROPONIN T, HIGH SENSITIVITY RESULT: 92 NG/L — HIGH (ref 0–51)
WBC # BLD: 5.48 K/UL — SIGNIFICANT CHANGE UP (ref 3.8–10.5)
WBC # BLD: 5.89 K/UL — SIGNIFICANT CHANGE UP (ref 3.8–10.5)
WBC # BLD: 6.78 K/UL — SIGNIFICANT CHANGE UP (ref 3.8–10.5)
WBC # FLD AUTO: 5.48 K/UL — SIGNIFICANT CHANGE UP (ref 3.8–10.5)
WBC # FLD AUTO: 5.89 K/UL — SIGNIFICANT CHANGE UP (ref 3.8–10.5)
WBC # FLD AUTO: 6.78 K/UL — SIGNIFICANT CHANGE UP (ref 3.8–10.5)

## 2021-11-01 PROCEDURE — 93010 ELECTROCARDIOGRAM REPORT: CPT

## 2021-11-01 PROCEDURE — 99233 SBSQ HOSP IP/OBS HIGH 50: CPT

## 2021-11-01 PROCEDURE — 93261 INTERROGATE SUBQ DEFIB: CPT | Mod: 26

## 2021-11-01 PROCEDURE — 71045 X-RAY EXAM CHEST 1 VIEW: CPT | Mod: 26

## 2021-11-01 RX ORDER — POTASSIUM CHLORIDE 20 MEQ
20 PACKET (EA) ORAL ONCE
Refills: 0 | Status: COMPLETED | OUTPATIENT
Start: 2021-11-01 | End: 2021-11-01

## 2021-11-01 RX ORDER — MAGNESIUM SULFATE 500 MG/ML
2 VIAL (ML) INJECTION
Refills: 0 | Status: COMPLETED | OUTPATIENT
Start: 2021-11-01 | End: 2021-11-01

## 2021-11-01 RX ORDER — METOPROLOL TARTRATE 50 MG
25 TABLET ORAL EVERY 12 HOURS
Refills: 0 | Status: DISCONTINUED | OUTPATIENT
Start: 2021-11-01 | End: 2021-11-02

## 2021-11-01 RX ORDER — AMIODARONE HYDROCHLORIDE 400 MG/1
200 TABLET ORAL EVERY 12 HOURS
Refills: 0 | Status: DISCONTINUED | OUTPATIENT
Start: 2021-11-09 | End: 2021-11-15

## 2021-11-01 RX ORDER — MAGNESIUM SULFATE 500 MG/ML
2 VIAL (ML) INJECTION ONCE
Refills: 0 | Status: DISCONTINUED | OUTPATIENT
Start: 2021-11-01 | End: 2021-11-01

## 2021-11-01 RX ORDER — FENTANYL CITRATE 50 UG/ML
30 INJECTION INTRAVENOUS
Refills: 0 | Status: DISCONTINUED | OUTPATIENT
Start: 2021-11-01 | End: 2021-11-02

## 2021-11-01 RX ORDER — ONDANSETRON 8 MG/1
4 TABLET, FILM COATED ORAL ONCE
Refills: 0 | Status: COMPLETED | OUTPATIENT
Start: 2021-11-01 | End: 2021-11-01

## 2021-11-01 RX ORDER — FUROSEMIDE 40 MG
20 TABLET ORAL ONCE
Refills: 0 | Status: COMPLETED | OUTPATIENT
Start: 2021-11-01 | End: 2021-11-01

## 2021-11-01 RX ORDER — POTASSIUM CHLORIDE 20 MEQ
40 PACKET (EA) ORAL EVERY 4 HOURS
Refills: 0 | Status: DISCONTINUED | OUTPATIENT
Start: 2021-11-01 | End: 2021-11-01

## 2021-11-01 RX ORDER — AMIODARONE HYDROCHLORIDE 400 MG/1
200 TABLET ORAL EVERY 24 HOURS
Refills: 0 | Status: DISCONTINUED | OUTPATIENT
Start: 2021-11-16 | End: 2021-11-15

## 2021-11-01 RX ORDER — AMIODARONE HYDROCHLORIDE 400 MG/1
400 TABLET ORAL EVERY 12 HOURS
Refills: 0 | Status: COMPLETED | OUTPATIENT
Start: 2021-11-02 | End: 2021-11-08

## 2021-11-01 RX ORDER — METOPROLOL TARTRATE 50 MG
25 TABLET ORAL EVERY 12 HOURS
Refills: 0 | Status: DISCONTINUED | OUTPATIENT
Start: 2021-11-01 | End: 2021-11-01

## 2021-11-01 RX ADMIN — LIDOCAINE 1 PATCH: 4 CREAM TOPICAL at 16:06

## 2021-11-01 RX ADMIN — PANTOPRAZOLE SODIUM 40 MILLIGRAM(S): 20 TABLET, DELAYED RELEASE ORAL at 05:42

## 2021-11-01 RX ADMIN — Medication 50 GRAM(S): at 14:11

## 2021-11-01 RX ADMIN — Medication 20 MILLIGRAM(S): at 09:15

## 2021-11-01 RX ADMIN — HEPARIN SODIUM 5000 UNIT(S): 5000 INJECTION INTRAVENOUS; SUBCUTANEOUS at 05:40

## 2021-11-01 RX ADMIN — Medication 1 DROP(S): at 18:46

## 2021-11-01 RX ADMIN — DORZOLAMIDE HYDROCHLORIDE TIMOLOL MALEATE 1 DROP(S): 20; 5 SOLUTION/ DROPS OPHTHALMIC at 18:46

## 2021-11-01 RX ADMIN — ONDANSETRON 4 MILLIGRAM(S): 8 TABLET, FILM COATED ORAL at 01:18

## 2021-11-01 RX ADMIN — Medication 0.25 MILLIGRAM(S): at 21:09

## 2021-11-01 RX ADMIN — HEPARIN SODIUM 5000 UNIT(S): 5000 INJECTION INTRAVENOUS; SUBCUTANEOUS at 21:10

## 2021-11-01 RX ADMIN — LIDOCAINE 1 PATCH: 4 CREAM TOPICAL at 19:04

## 2021-11-01 RX ADMIN — FENTANYL CITRATE 30 MILLILITER(S): 50 INJECTION INTRAVENOUS at 12:26

## 2021-11-01 RX ADMIN — CHLORHEXIDINE GLUCONATE 1 APPLICATION(S): 213 SOLUTION TOPICAL at 09:15

## 2021-11-01 RX ADMIN — FENTANYL CITRATE 30 MILLILITER(S): 50 INJECTION INTRAVENOUS at 19:05

## 2021-11-01 RX ADMIN — LATANOPROST 1 DROP(S): 0.05 SOLUTION/ DROPS OPHTHALMIC; TOPICAL at 21:09

## 2021-11-01 RX ADMIN — Medication 20 MILLIEQUIVALENT(S): at 12:36

## 2021-11-01 RX ADMIN — Medication 50 GRAM(S): at 12:36

## 2021-11-01 RX ADMIN — HEPARIN SODIUM 5000 UNIT(S): 5000 INJECTION INTRAVENOUS; SUBCUTANEOUS at 16:06

## 2021-11-01 RX ADMIN — DORZOLAMIDE HYDROCHLORIDE TIMOLOL MALEATE 1 DROP(S): 20; 5 SOLUTION/ DROPS OPHTHALMIC at 05:39

## 2021-11-01 RX ADMIN — ONDANSETRON 4 MILLIGRAM(S): 8 TABLET, FILM COATED ORAL at 16:06

## 2021-11-01 RX ADMIN — FENTANYL CITRATE 30 MILLILITER(S): 50 INJECTION INTRAVENOUS at 07:04

## 2021-11-01 RX ADMIN — Medication 40 MILLIGRAM(S): at 05:39

## 2021-11-01 RX ADMIN — Medication 30 MILLILITER(S): at 19:32

## 2021-11-01 RX ADMIN — Medication 6 MILLIGRAM(S): at 21:09

## 2021-11-01 RX ADMIN — Medication 25 MILLIGRAM(S): at 17:41

## 2021-11-01 RX ADMIN — ONDANSETRON 4 MILLIGRAM(S): 8 TABLET, FILM COATED ORAL at 09:19

## 2021-11-01 RX ADMIN — FENTANYL CITRATE 30 MILLILITER(S): 50 INJECTION INTRAVENOUS at 16:56

## 2021-11-01 RX ADMIN — FENTANYL CITRATE 30 MILLILITER(S): 50 INJECTION INTRAVENOUS at 09:17

## 2021-11-01 NOTE — PROGRESS NOTE ADULT - ASSESSMENT
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 He required icd extraction, and a subq icd was replaced. He is now s/p Whipple procedure     now off of pressor support with improved BP  - was noted to be having runs of NSVT, up to 14 beats.  A few strips seemed to be consistent with AF  - was transitioned from iv metoprolol to his usual low dose of sotalol, and his ectopy has improved    - Keep K greater than 4 and Mg greater than 2    - known history of systolic hf (mod lv dysfunction and mod ms)  - echo 10/27 with moderate ms at HR 80, tavr in place. severe LV dysfunction  - appears more orthopneic today. There is likely a volume component as well as pain causing this. Would give lasix 40mg IV x 1 today.   - Please continue to maintain strict I/Os, monitor daily weights, Cr, and K.   - entresto remains on hold, and would consider resuming it, noting that his bp is improved     - history of PE in 7/2021, and had been on xarelto.  is at elevated risk of vte noting pancreatic malignancy and relatively recent pe  - resume ac when safe from a surgical perspective. is presently on dvt ppx sq heparin    - Other cardiovascular workup will depend on clinical course.  - will follow with you

## 2021-11-01 NOTE — PROGRESS NOTE ADULT - SUBJECTIVE AND OBJECTIVE BOX
Day 1 of Anesthesia Pain Management Service    SUBJECTIVE: I'm doing ok    Pain Scale Score:	[X] Refer to charted pain scores    THERAPY: [X ] IV PCA Fentanyl  50 micrograms/mL    Demand dose: 10mcg     Lockout: 6 minutes   Continuous Rate: 25 mcg/hr  4 Hour Limit: 300 mcg    MEDICATIONS  (STANDING):  chlorhexidine 2% Cloths 1 Application(s) Topical <User Schedule>  dorzolamide 2%/timolol 0.5% Ophthalmic Solution 1 Drop(s) Both EYES two times a day  fentaNYL PCA (50 MICROgram(s)/mL) 30 milliLiter(s) PCA Continuous PCA Continuous  furosemide   Injectable 20 milliGRAM(s) IV Push once  heparin   Injectable 5000 Unit(s) SubCutaneous every 8 hours  influenza   Vaccine 0.5 milliLiter(s) IntraMuscular once  insulin lispro (ADMELOG) corrective regimen sliding scale   SubCutaneous three times a day before meals  insulin lispro (ADMELOG) corrective regimen sliding scale   SubCutaneous at bedtime  ketorolac 0.5% Ophthalmic Solution 1 Drop(s) Both EYES daily  lactated ringers. 1000 milliLiter(s) (20 mL/Hr) IV Continuous <Continuous>  latanoprost 0.005% Ophthalmic Solution 1 Drop(s) Both EYES at bedtime  lidocaine   4% Patch 1 Patch Transdermal daily  melatonin 6 milliGRAM(s) Oral at bedtime  pantoprazole    Tablet 40 milliGRAM(s) Oral before breakfast  sotalol 40 milliGRAM(s) Oral every 12 hours    MEDICATIONS  (PRN):  ALPRAZolam 0.25 milliGRAM(s) Oral three times a day PRN Anxiety  diphenhydrAMINE Injectable 25 milliGRAM(s) IV Push every 4 hours PRN Pruritus  fentaNYL  PCA (50 MICROgram(s)/mL) Rescue Clinician Bolus 10 MICROGram(s) IV Push every 15 minutes PRN for Pain Scale GREATER THAN 6  naloxone Injectable 0.1 milliGRAM(s) IV Push every 3 minutes PRN For ANY of the following changes in patient status:  A. RR LESS THAN 10 breaths per minute, B. Oxygen saturation LESS THAN 90%, C. Sedation score of 6  ondansetron Injectable 4 milliGRAM(s) IV Push every 6 hours PRN Nausea      OBJECTIVE:    Sedation Score:	[  ] Alert 	[X ] Drowsy 	[ ] Arousable	[ ] Asleep	[ ] Unresponsive    Side Effects:	[X ] None	[ ] Nausea	[ ] Vomiting	[ ] Pruritus  		[ ] Other:    Vital Signs Last 24 Hrs  T(C): 36.9 (01 Nov 2021 05:17), Max: 37.2 (31 Oct 2021 21:40)  T(F): 98.4 (01 Nov 2021 05:17), Max: 99 (31 Oct 2021 21:40)  HR: 73 (01 Nov 2021 05:17) (72 - 97)  BP: 150/79 (01 Nov 2021 05:17) (106/58 - 166/79)  BP(mean): 103 (31 Oct 2021 15:00) (77 - 111)  RR: 18 (01 Nov 2021 05:17) (12 - 25)  SpO2: 97% (01 Nov 2021 05:17) (93% - 98%)    ASSESSMENT/ PLAN    Therapy to  be:               [X] Continued   [ ] Discontinued   [ ] Changed to PRN Analgesics    Documentation and Verification of current medications:   [X] Done	[ ] Not done, not eligible    Comments: Endorsing good analgesia with PCA. Consider transition to prn analgesics later today, tolerating po diet.  Continuous rate d\c'd during rounds. Demand dose only. Patient educated to change.

## 2021-11-01 NOTE — PHYSICAL EXAM
[FreeTextEntry1] : General: No acute distress. Nourished and well kempt. Presents in a wheel chair.\par Head: AT/NC\par Eyes: PERRL. EOMI.\par Pulmonary: No respiratory distress. \par ABD: Soft. NT/ND. No rebound, no guarding, no rigidity. No peritoneal signs. No masses.\par Extremities: RLE dressing on foot intact and wrapped in ace wrap.\par Neurological: A&O x 4.\par Psychiatric: Normal affect and mood.\par

## 2021-11-01 NOTE — CONSULT NOTE ADULT - ATTENDING COMMENTS
Pt seen and examined with SICU team, agree with above. Pt feeling well. S/p AICD shock for SVTs, appreciate EP input. Will hold sotalol, continue metoprolol, and will order amio load for 24 hours after sotalol stopped.
80yr M hx of cardiac co morbidity , hx of pancreatic cancer, s/p whipple. extubated in PACU came to SICU off vasopressor support    alert and awake, following commands.   pain service consulted   NC O2, wean as tolerated  monitor for hemodynamics  PE hx, rivaroxaban on hold per primary for now  AICD extracted in s/o MSSA, new perc wires placed in left chest  may need EP tomorrow  NPO for now, on whipple post op pathway  monitor urine output  Mg repletion  hold SQH  amputation, clarify podiatry f/u  periop AB  glycemic control
Secondary prevention ICD with recent shock for MMVT versus AT (not on telemetry at the time and not clear by intracardiac tracings). Discontinuing low dose sotalol and beginning amiodarone load.

## 2021-11-01 NOTE — CONSULT NOTE ADULT - ASSESSMENT
79 y/o male w/ a PMHx of CAD s/p stents x2, HFrEF of ~35% s/p AICD, s/p TAVR, HTN, HLD, bilateral PEs, MSSA bacteremia secondary to right foot osteomyelitis s/p debridement, GERD, BPH, anxiety, insomnia, spinal stenosis, macular degeneration, left KARIE w/ revision x2, and pancreatic CA s/p neoadjuvant chemotherapy presenting s/p Whipple w/ a post-op course c/b hypovolemic shock, episodes of NSVT, and an allergic reaction questionable Dilaudid, re-admit to SICU for s/p shock for arrythmia.    Neuro: acute post-op pain, anxiety, insomnia  - Multimodal pain control with lidocaine patch, IV acetaminophen, and a fentanyl PCA  - Home Xanax PRN anxiety  - Home melatonin for insomnia    Resp: prevent atelectasis  - Out of bed to chair, ambulate as tolerated, and incentive spirometry to prevent atelectasis    CV: s/p shock, for arrythmia  Hx ofCAD, HFrEF, s/p TAVR, HTN, hypovolemic shock (resolved), episodes of NSVT  - EP   -cardiology  -EP interrogated   -monitor HR/ BP   - sotalol 40mg q12h    GI: s/p Whipple POD #7,  GERD  - Full liquid diet; will advance diet as per primary team/Whipple pathway  - Protonix BID for bloody NGT output on POD #1 but can likely downgrade to daily  - Will continue to monitor drain output and daily drain amylase levels   - Zofran PRN nausea/vomiting    Renal: BPH  - Monitor I&Os and electrolytes w/ repletions as necessary  - LR at 20 mL/hr as KVO for fentanyl PCA    Heme:   - SQH for VTE prophylaxis    ID:   - Monitor for clinical evidence of active infection    Endo: HLD , HgbA1C 5.3% on 9/22  - Monitor glucose w/ ISS before meals & at bedtime for glycemic control

## 2021-11-01 NOTE — PROGRESS NOTE ADULT - SUBJECTIVE AND OBJECTIVE BOX
24h Events:  -Prevena Vac taken down 10/31, significant drainage, incision opened at bedside, packed wtd   -No acute events overnight.    Subjective:   Patient seen at bedside this AM.     Objective:  Vital Signs  T(C): 36.8 (11-01 @ 09:25), Max: 37.2 (10-31 @ 21:40)  HR: 77 (11-01 @ 09:25) (72 - 97)  BP: 115/75 (11-01 @ 09:25) (106/58 - 166/79)  RR: 18 (11-01 @ 09:25) (14 - 25)  SpO2: 96% (11-01 @ 09:25) (93% - 98%)  10-31-21 @ 07:01  -  11-01-21 @ 07:00  --------------------------------------------------------  IN:  Total IN: 0 mL    OUT:    Bulb (mL): 265 mL    Voided (mL): 2100 mL  Total OUT: 2365 mL    Total NET: -2365 mL      11-01-21 @ 07:01  -  11-01-21 @ 10:18  --------------------------------------------------------  IN:  Total IN: 0 mL    OUT:    Bulb (mL): 70 mL    Voided (mL): 150 mL  Total OUT: 220 mL    Total NET: -220 mL          Physical Exam:  GEN: resting in bed comfortably in NAD  RESP: no increased WOB  ABD: soft, non-distended, non-tender without rebound tenderness or guarding. Incision sites clean, dry, and intact without erythema or edema.  EXTR: warm, well-perfused, no edema  NEURO: awake, alert    Labs:                        8.5    5.48  )-----------( 188      ( 01 Nov 2021 07:18 )             26.8   11-01    136  |  101  |  17  ----------------------------<  103<H>  3.9   |  23  |  0.68    Ca    8.1<L>      01 Nov 2021 07:23  Phos  3.3     11-01  Mg     1.8     11-01    TPro  5.7<L>  /  Alb  2.9<L>  /  TBili  0.7  /  DBili  x   /  AST  39  /  ALT  33  /  AlkPhos  86  11-01    CAPILLARY BLOOD GLUCOSE      POCT Blood Glucose.: 107 mg/dL (01 Nov 2021 09:09)  POCT Blood Glucose.: 123 mg/dL (31 Oct 2021 22:44)  POCT Blood Glucose.: 126 mg/dL (31 Oct 2021 18:00)  POCT Blood Glucose.: 125 mg/dL (31 Oct 2021 11:38)      Imaging:     24h Events:  -Prevena Vac taken down 10/31, significant drainage, incision opened at bedside, packed wtd   - Transfer from SICU to floor  - No acute events overnight.    Subjective:   Patient seen at bedside this AM. Denies fever, chills, nausea, vomiting, chest pain, SOB overnight. Dressing changed at bedside, patient tolerated. Pain well controlled. Passing gas, no BMs. Tolerating full liquid diet.     Objective:  Vital Signs  T(C): 36.8 (11-01 @ 09:25), Max: 37.2 (10-31 @ 21:40)  HR: 77 (11-01 @ 09:25) (72 - 97)  BP: 115/75 (11-01 @ 09:25) (106/58 - 166/79)  RR: 18 (11-01 @ 09:25) (14 - 25)  SpO2: 96% (11-01 @ 09:25) (93% - 98%)  10-31-21 @ 07:01  -  11-01-21 @ 07:00  --------------------------------------------------------  IN:  Total IN: 0 mL    OUT:    Bulb (mL): 265 mL    Voided (mL): 2100 mL  Total OUT: 2365 mL    Total NET: -2365 mL      11-01-21 @ 07:01  -  11-01-21 @ 10:18  --------------------------------------------------------  IN:  Total IN: 0 mL    OUT:    Bulb (mL): 70 mL    Voided (mL): 150 mL  Total OUT: 220 mL    Total NET: -220 mL          Physical Exam:  GEN: resting in bed comfortably in NAD  RESP: no increased WOB  ABD: soft, non-distended, tender to palpation appropriately around incisions. Midline incision open, w packing, superior aspect of incision with fibrinous tissue, not clean  NEURO: awake, alert    Labs:                        8.5    5.48  )-----------( 188      ( 01 Nov 2021 07:18 )             26.8   11-01    136  |  101  |  17  ----------------------------<  103<H>  3.9   |  23  |  0.68    Ca    8.1<L>      01 Nov 2021 07:23  Phos  3.3     11-01  Mg     1.8     11-01    TPro  5.7<L>  /  Alb  2.9<L>  /  TBili  0.7  /  DBili  x   /  AST  39  /  ALT  33  /  AlkPhos  86  11-01    CAPILLARY BLOOD GLUCOSE      POCT Blood Glucose.: 107 mg/dL (01 Nov 2021 09:09)  POCT Blood Glucose.: 123 mg/dL (31 Oct 2021 22:44)  POCT Blood Glucose.: 126 mg/dL (31 Oct 2021 18:00)  POCT Blood Glucose.: 125 mg/dL (31 Oct 2021 11:38)      Imaging:

## 2021-11-01 NOTE — PROGRESS NOTE ADULT - SUBJECTIVE AND OBJECTIVE BOX
[facility] -- Milo Thomas, Binta Mata, Arian Gu Savella  Office # [phone]      Follow Up:  HF    Subjective/Observations: Patient seen and examined. Events noted. Resting comfortably in bed. No complaints of chest pain,  , or palpitations reported. Still with abd pain. appears more orthopneic. denies sig dyspnea.       REVIEW OF SYSTEMS: All other review of systems is negative unless indicated above    PAST MEDICAL & SURGICAL HISTORY:  HTN (hypertension)    HLD (hyperlipidemia)    GERD (gastroesophageal reflux disease)    Cardiomyopathy    MSSA bacteremia  9/2021    Acute osteomyelitis    Pulmonary embolism    Pancreas cancer  chemo    [place] (hard of hearing)  B/L hearing aids    History of total hip replacement  left X 1, 2 revisions    History of laminectomy  X3    ICD (implantable cardiac defibrillator) in place  implanted 2005, replaced 10/4/2021 [employer] Subcutaneous ICD    Benign testicular tumor  radiation    Status post amputation of toe of right foot  8/2021    Status post fracture of femur  2017 left with hardware    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    H/O Whipple procedure  2/2021        MEDICATIONS  (STANDING):  chlorhexidine 2% Cloths 1 Application(s) Topical <User Schedule>  dorzolamide 2%/timolol 0.5% Ophthalmic Solution 1 Drop(s) Both EYES two times a day  fentaNYL PCA (50 MICROgram(s)/mL) 30 milliLiter(s) PCA Continuous PCA Continuous  furosemide   Injectable 20 milliGRAM(s) IV Push once  heparin   Injectable 5000 Unit(s) SubCutaneous every 8 hours  influenza   Vaccine 0.5 milliLiter(s) IntraMuscular once  insulin lispro (ADMELOG) corrective regimen sliding scale   SubCutaneous three times a day before meals  insulin lispro (ADMELOG) corrective regimen sliding scale   SubCutaneous at bedtime  ketorolac 0.5% Ophthalmic Solution 1 Drop(s) Both EYES daily  lactated ringers. 1000 milliLiter(s) (20 mL/Hr) IV Continuous <Continuous>  latanoprost 0.005% Ophthalmic Solution 1 Drop(s) Both EYES at bedtime  lidocaine   4% Patch 1 Patch Transdermal daily  melatonin 6 milliGRAM(s) Oral at bedtime  pantoprazole    Tablet 40 milliGRAM(s) Oral before breakfast  sotalol 40 milliGRAM(s) Oral every 12 hours    MEDICATIONS  (PRN):  ALPRAZolam 0.25 milliGRAM(s) Oral three times a day PRN Anxiety  diphenhydrAMINE Injectable 25 milliGRAM(s) IV Push every 4 hours PRN Pruritus  fentaNYL  PCA (50 MICROgram(s)/mL) Rescue Clinician Bolus 10 MICROGram(s) IV Push every 15 minutes PRN for Pain Scale GREATER THAN 6  naloxone Injectable 0.1 milliGRAM(s) IV Push every 3 minutes PRN For ANY of the following changes in patient status:  A. RR LESS THAN 10 breaths per minute, B. Oxygen saturation LESS THAN 90%, C. Sedation score of 6  ondansetron Injectable 4 milliGRAM(s) IV Push every 6 hours PRN Nausea      Allergies    Dilaudid (Anaphylaxis; Hives)    Intolerances            Vital Signs Last 24 Hrs  T(C): 36.9 (01 Nov 2021 05:17), Max: 37.2 (31 Oct 2021 21:40)  T(F): 98.4 (01 Nov 2021 05:17), Max: 99 (31 Oct 2021 21:40)  HR: 73 (01 Nov 2021 05:17) (72 - 97)  BP: 150/79 (01 Nov 2021 05:17) (106/58 - 166/79)  BP(mean): 103 (31 Oct 2021 15:00) (77 - 111)  RR: 18 (01 Nov 2021 05:17) (12 - 25)  SpO2: 97% (01 Nov 2021 05:17) (93% - 98%)    I&O's Summary    31 Oct 2021 07:01  -  01 Nov 2021 07:00  --------------------------------------------------------  IN: 1190 mL / OUT: 2365 mL / NET: -1175 mL          PHYSICAL EXAM:  TELE:   Constitutional: NAD, awake   HEENT: Moist Mucous Membranes, Anicteric  Pulmonary: Decreased breath sounds b/l. No rales, crackles or wheeze appreciated.   Cardiovascular: Regular, S1 and S2, No murmurs, rubs, gallops or clicks  Gastrointestinal: Bowel Sounds present, soft, + tenderness.   Lymph: No peripheral edema. No lymphadenopathy.  Skin: No visible rashes or ulcers.  Psych:  Mood & affect appropriate for situation    LABS: All Labs Reviewed:                        8.5    5.48  )-----------( 188      ( 01 Nov 2021 07:18 )             26.8                         8.6    5.82  )-----------( 144      ( 31 Oct 2021 09:55 )             27.1                         8.6    5.53  )-----------( 142      ( 30 Oct 2021 22:28 )             27.5     01 Nov 2021 07:23    136    |  101    |  17     ----------------------------<  103    3.9     |  23     |  0.68   31 Oct 2021 09:55    136    |  105    |  21     ----------------------------<  108    3.8     |  20     |  0.73   30 Oct 2021 22:28    135    |  105    |  23     ----------------------------<  129    4.2     |  20     |  0.82     Ca    8.1        01 Nov 2021 07:23  Ca    8.1        31 Oct 2021 09:55  Ca    8.4        30 Oct 2021 22:28  Phos  3.3       01 Nov 2021 07:23  Phos  3.2       31 Oct 2021 09:55  Phos  3.4       30 Oct 2021 22:28  Mg     1.8       01 Nov 2021 07:23  Mg     1.7       31 Oct 2021 09:55  Mg     2.0       30 Oct 2021 22:28    TPro  5.7    /  Alb  2.9    /  TBili  0.7    /  DBili  x      /  AST  39     /  ALT  33     /  AlkPhos  86     01 Nov 2021 07:23  TPro  5.7    /  Alb  2.8    /  TBili  0.8    /  DBili  x      /  AST  65     /  ALT  43     /  AlkPhos  77     31 Oct 2021 09:55  TPro  6.2    /  Alb  3.0    /  TBili  1.1    /  DBili  x      /  AST  117    /  ALT  56     /  AlkPhos  77     30 Oct 2021 22:28    PT/INR - ( 30 Oct 2021 22:28 )   PT: 12.8 sec;   INR: 1.07 ratio         PTT - ( 30 Oct 2021 22:28 )  PTT:27.6 sec

## 2021-11-01 NOTE — CONSULT NOTE ADULT - SUBJECTIVE AND OBJECTIVE BOX
HISTORY OF PRESENT ILLNESS:  Patient is a 80y Male w/ a PMHx of CAD s/p stents x2 (~15 years ago), HFrEF of ~35% s/p AICD (2005 but extracted 9/2021 and reimplanted 10/2021 in the setting of MSSA bacteremia), s/p TAVR (4/2021), HTN, HLD, bilateral PEs (7/2021 s/p Xarelto), MSSA bacteremia secondary to right foot osteomyelitis s/p debridement (9/2021), GERD, BPH, anxiety, insomnia, spinal stenosis, macular degeneration, left KARIE w/ revision x2, and pancreatic CA (diagnosed 3/2021) s/p neoadjuvant chemotherapy who presented on 10/26 for a scheduled Whipple. Patient was requiring vasopressor support intra-operatively so he was admitted to SICU for further management post-operatively. Post-op course has been c/b hypovolemic shock, episodes of NSVT, and an allergic reaction to most likely Dilaudid. Patient was transferred out of the SICU, while on the floor, AICD shocked a rhythm, ? SVT vs rapid Afib, was hypotensive and hypoxic, transferred back to SICU for further hemodynamic monitoring.     PAST MEDICAL HISTORY:   HTN (hypertension)  HLD (hyperlipidemia)  GERD (gastroesophageal reflux disease)  Cardiomyopathy  MSSA bacteremia  Acute osteomyelitis  Pulmonary embolism  Pancreas cancer  [place] (hard of hearing)      PAST SURGICAL HISTORY:   History of total hip replacement  History of laminectomy  ICD (implantable cardiac defibrillator) in place  Benign testicular tumor  History of hip replacement  Status post amputation of toe of right foot  Status post fracture of femur  S/P TAVR (transcatheter aortic valve replacement)  H/O Whipple procedure      FAMILY HISTORY:  Family history of stroke (Mother)      SOCIAL HISTORY:    CODE STATUS:     HOME MEDICATIONS:    ALLERGIES: Dilaudid (Anaphylaxis; Hives)      VITAL SIGNS:  T(C): 36.3 (01 Nov 2021 12:20), Max: 37.2 (31 Oct 2021 21:40)  T(F): 97.3 (01 Nov 2021 12:20), Max: 99 (31 Oct 2021 21:40)  HR: 75 (01 Nov 2021 14:00) (72 - 90)  BP: 122/59 (01 Nov 2021 14:00) (115/55 - 150/79)  BP(mean): 84 (01 Nov 2021 14:00) (79 - 103)  RR: 17 (01 Nov 2021 14:00) (16 - 26)  SpO2: 96% (01 Nov 2021 14:00) (88% - 98%)    NEURO  Exam: awake and alert  ALPRAZolam 0.25 milliGRAM(s) Oral three times a day PRN Anxiety  fentaNYL  PCA (50 MICROgram(s)/mL) Rescue Clinician Bolus 10 MICROGram(s) IV Push every 15 minutes PRN for Pain Scale GREATER THAN 6  fentaNYL PCA (50 MICROgram(s)/mL) 30 milliLiter(s) PCA Continuous PCA Continuous  melatonin 6 milliGRAM(s) Oral at bedtime  ondansetron Injectable 4 milliGRAM(s) IV Push every 6 hours PRN Nausea      RESPIRATORY  Exam: b/l air entry  diphenhydrAMINE Injectable 25 milliGRAM(s) IV Push every 4 hours PRN Pruritus      CARDIOVASCULAR  VBG - ( 01 Nov 2021 11:27 )  pH: 7.40  /  pCO2: 39    /  pO2: 36    / HCO3: 24    / Base Excess: -0.5  /  SaO2: 54.3   Lactate: 1.8        Exam: S1S2 RRR  Cardiac Rhythm: sinus  sotalol 40 milliGRAM(s) Oral every 12 hours      GI/NUTRITION  Exam: distended, mild tender,   Diet: npo  pantoprazole    Tablet 40 milliGRAM(s) Oral before breakfast      GENITOURINARY/RENAL  lactated ringers. 1000 milliLiter(s) IV Continuous <Continuous>      10-31 @ 07:01  -  11-01 @ 07:00  --------------------------------------------------------  IN:    IV PiggyBack: 150 mL    Lactated Ringers: 160 mL    Oral Fluid: 880 mL  Total IN: 1190 mL    OUT:    Bulb (mL): 265 mL    Voided (mL): 2100 mL  Total OUT: 2365 mL    Total NET: -1175 mL      11-01 @ 07:01  -  11-01 @ 14:13  --------------------------------------------------------  IN:    Oral Fluid: 220 mL  Total IN: 220 mL    OUT:    Bulb (mL): 70 mL    Voided (mL): 500 mL  Total OUT: 570 mL    Total NET: -350 mL          11-01    135  |  100  |  16  ----------------------------<  144<H>  4.1   |  22  |  0.73    Ca    8.3<L>      01 Nov 2021 11:29  Phos  3.6     11-01  Mg     1.6     11-01    TPro  6.1  /  Alb  2.9<L>  /  TBili  0.8  /  DBili  x   /  AST  47<H>  /  ALT  38  /  AlkPhos  96  11-01    [ ] Jimenez catheter, indication: urine output monitoring in critically ill patient    HEMATOLOGIC  [ ] VTE Prophylaxis:  heparin   Injectable 5000 Unit(s) SubCutaneous every 8 hours                          9.1    6.78  )-----------( 205      ( 01 Nov 2021 11:29 )             27.5     PT/INR - ( 01 Nov 2021 12:02 )   PT: 13.3 sec;   INR: 1.11 ratio         PTT - ( 01 Nov 2021 12:02 )  PTT:26.8 sec  Transfusion: [ ] PRBC	[ ] Platelets	[ ] FFP	[ ] Cryoprecipitate      INFECTIOUS DISEASES  influenza   Vaccine 0.5 milliLiter(s) IntraMuscular once    RECENT CULTURES:      ENDOCRINE  insulin lispro (ADMELOG) corrective regimen sliding scale   SubCutaneous three times a day before meals  insulin lispro (ADMELOG) corrective regimen sliding scale   SubCutaneous at bedtime    CAPILLARY BLOOD GLUCOSE      POCT Blood Glucose.: 102 mg/dL (01 Nov 2021 11:08)  POCT Blood Glucose.: 107 mg/dL (01 Nov 2021 09:09)  POCT Blood Glucose.: 123 mg/dL (31 Oct 2021 22:44)  POCT Blood Glucose.: 126 mg/dL (31 Oct 2021 18:00)      PATIENT CARE ACCESS DEVICES:  [x ] Peripheral IV  [ ] Central Venous Line	[ ] R	[ ] L	[ ] IJ	[ ] Fem	[ ] SC	Placed:   [ ] Arterial Line		[ ] R	[ ] L	[ ] Fem	[ ] Rad	[ ] Ax	Placed:   [ ] PICC:					[ ] Mediport  [ ] Urinary Catheter, Date Placed:   [x] Necessity of urinary, arterial, and venous catheters discussed    OTHER MEDICATIONS: chlorhexidine 2% Cloths 1 Application(s) Topical <User Schedule>  dorzolamide 2%/timolol 0.5% Ophthalmic Solution 1 Drop(s) Both EYES two times a day  ketorolac 0.5% Ophthalmic Solution 1 Drop(s) Both EYES daily  latanoprost 0.005% Ophthalmic Solution 1 Drop(s) Both EYES at bedtime  lidocaine   4% Patch 1 Patch Transdermal daily  naloxone Injectable 0.1 milliGRAM(s) IV Push every 3 minutes PRN

## 2021-11-01 NOTE — CONSULT NOTE ADULT - ASSESSMENT
81 y/o male w/ a PMHx of CAD s/p stents x2, HFrEF of ~35% s/p [employer] AICD (hx of sustained VT s/p ATP 2016 - maintained on Sotalol 40mg BID), s/p TAVR (4/2021), HTN, HLD, bilateral PEs (on Xarelto), MSSA bacteremia secondary to right foot osteomyelitis s/p debridement s/p ICD extraction 9/28/21 and S-ICD implant 10/4/21, GERD, BPH, anxiety, insomnia, spinal stenosis, macular degeneration, left KARIE w/ revision x2, and pancreatic CA s/p neoadjuvant chemotherapy presenting s/p Whipple w/ a post-op course c/b hypovolemic shock, episodes of NSVT, and an allergic reaction questionable Dilaudid. Pt also had an episode of pAT 9/26/21 which met rate for VT zone, received ATPx4 and 1 shock.   EP reconsulted for AICD firing.       Device interrogation today:   Episode of paroxysmal regular VT which becomes sustained s/p 1 DCCV with conversion. Pt reports lightheadedness and presyncope during the episode.   Pt was taken off his regular Sotalol dose ~2 days post Whipple and transitioned to IV Metoprolol. Sotalol 40mg BID was restarted on 10/30/21.   Pt transferred to SICU. Please keep on tele. Keep K>4, Mg>2.   Pt had sustained VT with appropriate ICD shock. Recommend discontinuing Sotalol. After 24 hour washout, please start Amiodarone.   - Loading dose: 400mg BID x1 weeks, THEN 200mg BID x1 week, THEN maintenance dose of 200mg once daily.   Please start Metoprolol, can uptitrate as tolerated.     Device settings:   Shock zone: 220bpm, conditional shock zone: 200bpm. (Must meet 18/24 beats to satisfy counter for episode)  No changes made to device settings.     Discussed with EP attending and primary team.   Will continue to follow.  79 y/o male w/ a PMHx of CAD s/p stents x2, HFrEF of ~35% s/p [employer] AICD (hx of sustained VT s/p ATP 2016 - maintained on Sotalol 40mg BID), s/p TAVR (4/2021), HTN, HLD, bilateral PEs (on Xarelto), MSSA bacteremia secondary to right foot osteomyelitis s/p debridement s/p ICD extraction 9/28/21 and S-ICD implant 10/4/21, GERD, BPH, anxiety, insomnia, spinal stenosis, macular degeneration, left KARIE w/ revision x2, and pancreatic CA s/p neoadjuvant chemotherapy presenting s/p Whipple w/ a post-op course c/b hypovolemic shock, episodes of NSVT, and an allergic reaction questionable Dilaudid. Pt also had an episode of pAT 9/26/21 which met rate for VT zone, received ATPx4 and 1 shock.   EP reconsulted for AICD firing.       Device interrogation today:   Episode of paroxysmal regular VT which becomes sustained s/p 1 DCCV with conversion to sinus rhythm. Pt reports lightheadedness and presyncope during the episode.   Pt was taken off his regular Sotalol dose ~2 days post Whipple and transitioned to IV Metoprolol. Sotalol 40mg BID was restarted on 10/30/21. Today's EKG QTC ~  Pt transferred to SICU. Please keep on tele. Keep K>4, Mg>2.   Pt had sustained VT with appropriate ICD shock. Recommend discontinuing Sotalol. After 24 hour washout, please start Amiodarone.   - Loading dose: 400mg BID x1 weeks, THEN 200mg BID x1 week, THEN maintenance dose of 200mg once daily.   Please start Metoprolol, can uptitrate as tolerated.     Device settings:   Shock zone: 220bpm, conditional shock zone: 200bpm. (Must meet 18/24 beats to satisfy counter for episode)  No changes made to device settings.     Discussed with EP attending and primary team.   Will continue to follow.  79 y/o male w/ a PMHx of CAD s/p stents x2, HFrEF of ~35% s/p [employer] AICD (hx of sustained VT s/p ATP 2016 - maintained on Sotalol 40mg BID), s/p TAVR (4/2021), HTN, HLD, bilateral PEs (on Xarelto), MSSA bacteremia secondary to right foot osteomyelitis s/p debridement s/p ICD extraction 9/28/21 and S-ICD implant 10/4/21, GERD, BPH, anxiety, insomnia, spinal stenosis, macular degeneration, left KARIE w/ revision x2, and pancreatic CA s/p neoadjuvant chemotherapy presenting s/p Whipple w/ a post-op course c/b hypovolemic shock, episodes of NSVT, and an allergic reaction questionable Dilaudid. Pt also had an episode of pAT 9/26/21 which met rate for VT zone, received ATPx4 and 1 shock.   EP reconsulted for AICD firing.       Device interrogation today:   Episode of paroxysmal regular VT which becomes sustained s/p 1 DCCV with conversion to sinus rhythm. Pt reports lightheadedness and presyncope during the episode.   Pt was taken off his regular Sotalol dose ~2 days post Whipple and transitioned to IV Metoprolol. Sotalol 40mg BID was restarted on 10/30/21. Today's EKG QTC ~462ms  Pt transferred to SICU. Please keep on tele. Keep K>4, Mg>2.   Pt had sustained VT with appropriate ICD shock. Recommend discontinuing Sotalol. After 24 hour washout, please start Amiodarone.   - Loading dose: 400mg BID x1 weeks, THEN 200mg BID x1 week, THEN maintenance dose of 200mg once daily.   Please start Metoprolol, can uptitrate as tolerated.     Device settings:   Shock zone: 220bpm, conditional shock zone: 200bpm. (Must meet 18/24 beats to satisfy counter for episode)  No changes made to device settings.     Discussed with EP attending and primary team.   Will continue to follow.  81 y/o male w/ a PMHx of CAD s/p stents x2, HFrEF of ~35% s/p Abbott AICD (hx of sustained VT s/p ATP 2016 - maintained on Sotalol 40mg BID), s/p TAVR (4/2021), HTN, HLD, bilateral PEs (on Xarelto), MSSA bacteremia secondary to right foot osteomyelitis s/p debridement s/p ICD extraction 9/28/21 and S-ICD implant 10/4/21, GERD, BPH, anxiety, insomnia, spinal stenosis, macular degeneration, left KARIE w/ revision x2, and pancreatic CA s/p neoadjuvant chemotherapy presenting s/p Whipple w/ a post-op course c/b hypovolemic shock, episodes of NSVT, and an allergic reaction questionable Dilaudid. Pt also had an episode of pAT 9/26/21 which met rate for VT zone, received ATPx4 and 1 shock.   EP reconsulted for AICD firing.       Device interrogation today:   Episode of paroxysmal regular VT which becomes sustained s/p 1 DCCV with conversion to sinus rhythm. Pt reports lightheadedness and presyncope during the episode.   Pt was taken off his regular Sotalol dose ~2 days post Whipple and transitioned to IV Metoprolol. Sotalol 40mg BID was restarted on 10/30/21. Today's EKG QTC ~462ms  Pt transferred to SICU. Please keep on tele. Keep K>4, Mg>2.   Pt had sustained VT with appropriate ICD shock. Recommend discontinuing Sotalol. After 24 hour washout, please start Amiodarone.   - Loading dose: 400mg BID x1 weeks, THEN 200mg BID x1 week, THEN maintenance dose of 200mg once daily.   Please start Metoprolol, can uptitrate as tolerated.     Device settings:   Shock zone: 220bpm, conditional shock zone: 200bpm. (Must meet 18/24 beats to satisfy counter for episode)  No changes made to device settings.     Discussed with EP attending, cardiology, and primary team.   Will continue to follow.  81 y/o male w/ a PMHx of CAD s/p stents x2, HFrEF of ~35% s/p [employer] AICD (hx of sustained VT s/p ATP 2016 - maintained on Sotalol 40mg BID), s/p TAVR (4/2021), HTN, HLD, bilateral PEs (on Xarelto), MSSA bacteremia secondary to right foot osteomyelitis s/p debridement s/p ICD extraction 9/28/21 and S-ICD implant 10/4/21, GERD, BPH, anxiety, insomnia, spinal stenosis, macular degeneration, left KARIE w/ revision x2, and pancreatic CA s/p neoadjuvant chemotherapy presenting s/p Whipple w/ a post-op course c/b hypovolemic shock, episodes of NSVT, and an allergic reaction questionable Dilaudid. Pt also had an episode of pAT 9/26/21 which met rate for VT zone, received ATPx4 and 1 shock.   EP reconsulted for AICD firing.       Device interrogation today:   Episode of paroxysmal regular VT which becomes sustained s/p 1 shock with conversion to sinus rhythm. Pt reports lightheadedness and presyncope during the episode.   Pt was taken off his regular Sotalol dose ~2 days post Whipple and transitioned to IV Metoprolol. Sotalol 40mg BID was restarted on 10/30/21. Today's EKG QTC ~462ms  Pt transferred to SICU. Please keep on tele. Keep K>4, Mg>2.   Pt had sustained VT with appropriate ICD shock. Recommend discontinuing Sotalol. After 24 hour washout, please start Amiodarone.   - Loading dose: 400mg BID x1 weeks, THEN 200mg BID x1 week, THEN maintenance dose of 200mg once daily.   Please start Metoprolol, can uptitrate as tolerated.     Device settings:   Shock zone: 220bpm, conditional shock zone: 200bpm. (Must meet 18/24 beats to satisfy counter for episode)  No changes made to device settings.     Discussed with EP attending, cardiology, and primary team.   Will continue to follow.

## 2021-11-01 NOTE — PROGRESS NOTE ADULT - ASSESSMENT
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 Patient now s/p Whipple 10/27, recovering in SICU, now transferred to the floor.     Plan  - Anesthesia following for pain  -Plan to DC PCA today, transition to PO meds   - FLD, tolerating  - Dressing changes BID, surgery will do  - Lasix 20mg   - Dvt PPX; SQH  - Appreciate cardiology following, on PO sotalol, plan to resume AC (hx PE) 1 week post op     [phone]  Red Surgery

## 2021-11-01 NOTE — HISTORY OF PRESENT ILLNESS
[de-identified] : Mr. JUANIS DE SANTIAGO is a 80 year old male who presents for evaluation in our [facility].\par His PMH is significant for pancreatic cancer (dx 3/2021), HTN, HLD, CHF, CAD s/p stents x2 (~15 years ago), defibrillator, TAVR (4/2021), bilateral PE (7/2021) on Xarelto, spinal stenosis, GERD, BPH, macular degeneration. \par He recently presented to [facility] and was transferred to [facility] on 9/26/21 w/ right hallux acute OM/ MSSA bacteremia. He is s/p R hallux amputation on 9/20, c/b ruptured hematoma. s/p CORBIN without evidence of vegetations, ICD lead extraction prophylactically, and mediport removal. He then underwent subcutaneous ICD placement on 10/4. He was d/c'd to subacute rehab on 10/5. He was d/c on course of antibiotics (cefazolin 2 g q8h until 10/14 to then keflex x 14 more days). \par \par Pancreatic cancer history-- initially p/w abdominal pain and jaundice in March to [facility]. 3/2/21 CT with mild CBD dilatation at the head of pancreas. 3/5/21 EUS noting pancreas head mass abutting the portal vein and ERCP with stent placement. He went to [facility] for second opinion and CT was repeated 3/12/21. He was staged as resectable, was planned for upfront surgery (Whipple) but was not cleared from cardiac standpoint. He then had TAVR procedure at [facility] on 3/25/21 where the patient decided to also assume oncological care. On 4/15/21 he underwent staging laparoscopy by Dr Murphy and was staged as borderline resectable (noted tumor touching the portal vein). He started neoadjuvant chemotherapy with Dr. Carrasco- Gemzar / Abraxane and completed C5D8 of 9/7/21. He received 2 infusions total of Zarxio for neutropenia. He is on Xarelto for history of PE that was found a few months ago on chest CT at [facility]. No PE noted on CT chest in 8/13/21. \par \par Genetic testing: Pt states no genetic mutations found \par \par Patient is staying in rehab and reports his allowed 25% weight bearing on his right lower extremity. He is able to walk with a walker/ assist. Prior to foot surgery he was able to walk on his own. Still on abx. Doing more physical activity at rehab. \par Family cancer hx: Father- prostate \par

## 2021-11-01 NOTE — ASSESSMENT
[FreeTextEntry1] : Mr. JUANIS DE SANTIAGO is a 80 year old male who presents for evaluation in our [facility]. His PMH is significant for pancreatic cancer (dx 3/2021), HTN, HLD, CHF, CAD s/p stents x2 (~15 years ago), defibrillator, TAVR (4/2021), bilateral PE (7/2021) on Xarelto, spinal stenosis, GERD, BPH, macular degeneration. \par He recently presented to [facility] and was transferred to [facility] on 9/26/21 w/ right hallux acute OM/ MSSA bacteremia. He is s/p R hallux amputation on 9/20, c/b ruptured hematoma. s/p CORBIN without evidence of vegetations, ICD lead extraction prophylactically, and mediport removal. He then underwent subcutaneous ICD placement on 10/4. He was d/c'd to subacute rehab on 10/5. He was d/c on course of antibiotics (cefazolin 2 g q8h until 10/14 to then keflex x 14 more days). \par \par Pancreatic cancer history-- initially p/w abdominal pain and jaundice in March to [facility]. 3/2/21 CT with mild CBD dilatation at the head of pancreas. 3/5/21 EUS noting pancreas head mass abutting the portal vein and ERCP with stent placement. He went to [facility] for second opinion and CT was repeated 3/12/21. He was staged as resectable, was planned for upfront surgery (Whipple) but was not cleared from cardiac standpoint. He then had TAVR procedure at [facility] on 3/25/21 where the patient decided to also assume oncological care. On 4/15/21 he underwent staging laparoscopy by Dr Murphy and was staged as borderline resectable (noted tumor touching the portal vein). He started neoadjuvant chemotherapy with Dr. Carrasco- Gemzar / Abraxane and completed C5D8 of 9/7/21. He received 2 infusions total of Zarxio for neutropenia. He is on Xarelto for history of PE that was found a few months ago on chest CT at [facility]. No PE noted on CT chest in 8/13/21. \par \par Discussed that his tumor is technically resectable. He did start chemotherapy, however, did not a full course of neoadjuvant chemo given his medical comorbidities and OM episode requiring amputation. Discussed that typically we try to optimize neoadjuvant chemo and radiation if necessary prior to surgical resection. He presents today for a third opinion and has been off therapy since 9/7/21. Discussed that if we were to move forward with radiation there were be a longer delay to surgery while not getting full systemic chemotherapy. At this time I recommend moving forward with surgery.\par \par Due to the location and size I recommend a pancreaticoduodenectomy (Whipple) procedure at this time. Jose a diagram for the patient and family to better understand the operation. The procedure and associated risks, benefits and possible complications were discussed and all questions were answered. The procedure involves removal of the gallbladder, head of the pancreas, bile duct, duodenum and sometimes a small portion of the stomach and will last 4-6 hours with a hospital stay of about one week. We discussed that the most common complaint after this procedure is fatigue followed by poor appetite. It will take approximately 8-12 weeks to feel "back to normal" after the operation. Risks, benefits and details of the operation were discussed. These include bleeding, infection, damage to surrounding structures, chyle leakage, delayed gastric emptying, cardiopulmonary complications and the risks of anesthesia. Mortality quoted at <1.5%. Discussed that adjuvant treatment including additional chemotherapy/ radiation therapy will be determined by final pathology. The patient expressed understanding and the desire to proceed. Our office will contact the patient to schedule. He will need cardiac clearance and recommendations for anticoagulation prior to surgery.  \par \par Today, I personally spent 60 minutes in total time including reviewing imaging and studies, discussing complex treatment regimens, direct face to face time with the patient, patient education and counseling.\par  \par

## 2021-11-01 NOTE — PROGRESS NOTE ADULT - ASSESSMENT
Pain Management Attending Addendum    SUBJECTIVE: Patient doing well with IV PCA    Therapy:    [X] IV PCA         [ ] PRN Analgesics    OBJECTIVE:   [X] Pain appropriately controlled    [ ] Other:    Side Effects:  [X] None	             [ ] Nausea              [ ] Pruritis                	[ ] Other:    ASSESSMENT/PLAN: Plan to discontinue infusion, will rely primarily on demand bolus doses. Patient educated on regimen, which he acknowledged.

## 2021-11-01 NOTE — CONSULT NOTE ADULT - SUBJECTIVE AND OBJECTIVE BOX
HISTORY OF PRESENT ILLNESS:  Patient is a 80y Male w/ a PMHx of CAD s/p stents x2 (~15 years ago), HFrEF of ~35% s/p AICD (2005 but extracted 9/28/2021 and S-ICD implanted 10/4/2021 in the setting of MSSA bacteremia), s/p TAVR (4/2021), HTN, HLD, bilateral PEs (7/2021 s/p Xarelto), MSSA bacteremia secondary to right foot osteomyelitis s/p debridement (9/2021), GERD, BPH, anxiety, insomnia, spinal stenosis, macular degeneration, left KARIE w/ revision x2, and pancreatic CA (diagnosed 3/2021) s/p neoadjuvant chemotherapy who presented on 10/26 for a scheduled Whipple. Pt is s/p Whipple 10/27. Patient was requiring vasopressor support intra-operatively so he was admitted to SICU for further management post-operatively. Post-op course has been c/b hypovolemic shock, episodes of NSVT, and an allergic reaction to most likely Dilaudid. Patient was transferred out of the SICU, while on the floor, became lightheaded and reported AICD shock. Hypotensive, hypoxic. RRT called, transferred to SICU.    Allergies  Dilaudid (Anaphylaxis; Hives)    	    MEDICATIONS:  heparin   Injectable 5000 Unit(s) SubCutaneous every 8 hours  metoprolol tartrate 25 milliGRAM(s) Oral every 12 hours  diphenhydrAMINE Injectable 25 milliGRAM(s) IV Push every 4 hours PRN  ALPRAZolam 0.25 milliGRAM(s) Oral three times a day PRN  fentaNYL  PCA (50 MICROgram(s)/mL) Rescue Clinician Bolus 10 MICROGram(s) IV Push every 15 minutes PRN  fentaNYL PCA (50 MICROgram(s)/mL) 30 milliLiter(s) PCA Continuous PCA Continuous  melatonin 6 milliGRAM(s) Oral at bedtime  ondansetron Injectable 4 milliGRAM(s) IV Push every 6 hours PRN  pantoprazole    Tablet 40 milliGRAM(s) Oral before breakfast  insulin lispro (ADMELOG) corrective regimen sliding scale   SubCutaneous three times a day before meals  insulin lispro (ADMELOG) corrective regimen sliding scale   SubCutaneous at bedtime  chlorhexidine 2% Cloths 1 Application(s) Topical <User Schedule>  dorzolamide 2%/timolol 0.5% Ophthalmic Solution 1 Drop(s) Both EYES two times a day  influenza   Vaccine 0.5 milliLiter(s) IntraMuscular once  ketorolac 0.5% Ophthalmic Solution 1 Drop(s) Both EYES daily  lactated ringers. 1000 milliLiter(s) IV Continuous <Continuous>  latanoprost 0.005% Ophthalmic Solution 1 Drop(s) Both EYES at bedtime  lidocaine   4% Patch 1 Patch Transdermal daily      PAST MEDICAL & SURGICAL HISTORY:  HTN (hypertension)    HLD (hyperlipidemia)    GERD (gastroesophageal reflux disease)    Cardiomyopathy    MSSA bacteremia  9/2021    Acute osteomyelitis    Pulmonary embolism    Pancreas cancer  chemo    [place] (hard of hearing)  B/L hearing aids    History of total hip replacement  left X 1, 2 revisions    History of laminectomy  X3    ICD (implantable cardiac defibrillator) in place  implanted 2005, replaced 10/4/2021 [employer] Subcutaneous ICD    Benign testicular tumor  radiation    Status post amputation of toe of right foot  8/2021    Status post fracture of femur  2017 left with hardware    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    H/O Whipple procedure  2/2021        FAMILY HISTORY:  Family history of stroke (Mother)      REVIEW OF SYSTEMS:  See HPI. Otherwise, 10 point ROS done and otherwise negative.    PHYSICAL EXAM:  T(C): 36.5 (11-01-21 @ 15:00), Max: 37.2 (10-31-21 @ 21:40)  HR: 75 (11-01-21 @ 15:00) (72 - 90)  BP: 135/64 (11-01-21 @ 15:00) (115/55 - 150/79)  RR: 27 (11-01-21 @ 15:00) (16 - 27)  SpO2: 99% (11-01-21 @ 15:00) (88% - 99%)  Wt(kg): --  I&O's Summary    31 Oct 2021 07:01  -  01 Nov 2021 07:00  --------------------------------------------------------  IN: 1190 mL / OUT: 2365 mL / NET: -1175 mL    01 Nov 2021 07:01  -  01 Nov 2021 15:38  --------------------------------------------------------  IN: 220 mL / OUT: 570 mL / NET: -350 mL        Appearance: Normal	  Cardiovascular: Normal S1 S2, No JVD  Respiratory: Lungs clear to auscultation	  Psychiatry: A & O x 3, Mood & affect appropriate  Neurologic: Non-focal  Extremities: No BLE edema      LABS:	 	    CBC Full  -  ( 01 Nov 2021 11:29 )  WBC Count : 6.78 K/uL  Hemoglobin : 9.1 g/dL  Hematocrit : 27.5 %  Platelet Count - Automated : 205 K/uL  Mean Cell Volume : 98.6 fl  Mean Cell Hemoglobin : 32.6 pg  Mean Cell Hemoglobin Concentration : 33.1 gm/dL  Auto Neutrophil # : 5.78 K/uL  Auto Lymphocyte # : 0.35 K/uL  Auto Monocyte # : 0.53 K/uL  Auto Eosinophil # : 0.12 K/uL  Auto Basophil # : 0.00 K/uL  Auto Neutrophil % : 85.2 %  Auto Lymphocyte % : 5.2 %  Auto Monocyte % : 7.8 %  Auto Eosinophil % : 1.8 %  Auto Basophil % : 0.0 %    11-01    135  |  100  |  16  ----------------------------<  144<H>  4.1   |  22  |  0.73  11-01    136  |  101  |  17  ----------------------------<  103<H>  3.9   |  23  |  0.68    Ca    8.3<L>      01 Nov 2021 11:29  Ca    8.1<L>      01 Nov 2021 07:23  Phos  3.6     11-01  Phos  3.3     11-01  Mg     1.6     11-01  Mg     1.8     11-01    TPro  6.1  /  Alb  2.9<L>  /  TBili  0.8  /  DBili  x   /  AST  47<H>  /  ALT  38  /  AlkPhos  96  11-01  TPro  5.7<L>  /  Alb  2.9<L>  /  TBili  0.7  /  DBili  x   /  AST  39  /  ALT  33  /  AlkPhos  86  11-01      ECG:  Sinus rhythm  with PACs. LBBB.     PREVIOUS DIAGNOSTIC TESTING:    [ ] Echocardiogram: < from: Transthoracic Echocardiogram (10.27.21 @ 07:59) >  Observations:  Mitral Valve: Mitral annular calcification and calcified  mitral leaflets with decreased diastolic opening. Mean  transmitral valve gradient equals 7 mm Hg, consistent with  moderate mitral stenosis. HR 80s  Aortic Valve/Aorta: Transcatheter aortic valve replacement.  Peak transaortic valve gradient equals 12 mm Hg, mean  transaortic valve gradient equals 7 mm Hg, which is  probably normal in the presence of a transcatheter aortic  valve replacement. No aortic valve regurgitation seen.  Aortic Root: 2.7 cm.  Left Atrium: Left atrium not well visualized.  Left Ventricle: Endocardial visualization enhanced with  intravenous injection of Ultrasonic Enhancing Agent  (Definity). Severe global left ventricular systolic  dysfunction. Mild left ventricular enlargement. Unable to  evaluate diastology.  Right Heart: Moderate right atrial enlargement.  A device  wire is noted in the right heart. The right ventricle is  not well visualized; grossly normal right ventricular  systolic function. Normal tricuspid valve. Minimal  tricuspid regurgitation. Pulmonic valve not well  visualized.  Pericardium/Pleura: Normal pericardium with no pericardial  effusion.  Hemodynamic: Estimated right atrial pressure is 8 mm Hg.  Estimated right ventricular systolic pressure equals 42 mm  Hg, assuming right atrial pressure equals 8 mm Hg,  consistent with mild pulmonary hypertension.  ------------------------------------------------------------------------  Conclusions:  1. Mitral annular calcification and calcified mitral  leaflets with decreased diastolic opening. Mean transmitral  valve gradient equals 7 mm Hg, consistent with moderate  mitral stenosis. HR 80s  2. Transcatheter aortic valve replacement. Peaktransaortic  valve gradient equals 12 mm Hg, mean transaortic valve  gradient equals 7 mm Hg, which is probably normal in the  presence of a transcatheter aortic valve replacement. No  aortic valve regurgitation seen.  3. Endocardial visualization enhanced with intravenous  injection of Ultrasonic Enhancing Agent (Definity). Severe  global left ventricular systolic dysfunction.  4. The right ventricle is not well visualized; grossly  normal right ventricular systolic function.    < end of copied text >

## 2021-11-02 LAB
ALBUMIN SERPL ELPH-MCNC: 2.9 G/DL — LOW (ref 3.3–5)
ALP SERPL-CCNC: 93 U/L — SIGNIFICANT CHANGE UP (ref 40–120)
ALT FLD-CCNC: 44 U/L — SIGNIFICANT CHANGE UP (ref 10–45)
AMYLASE FLD-CCNC: 4 U/L — SIGNIFICANT CHANGE UP
AMYLASE P1 CFR SERPL: 13 U/L — LOW (ref 25–125)
ANION GAP SERPL CALC-SCNC: 10 MMOL/L — SIGNIFICANT CHANGE UP (ref 5–17)
APTT BLD: 26.6 SEC — LOW (ref 27.5–35.5)
AST SERPL-CCNC: 61 U/L — HIGH (ref 10–40)
BILIRUB SERPL-MCNC: 0.7 MG/DL — SIGNIFICANT CHANGE UP (ref 0.2–1.2)
BUN SERPL-MCNC: 11 MG/DL — SIGNIFICANT CHANGE UP (ref 7–23)
CALCIUM SERPL-MCNC: 8.2 MG/DL — LOW (ref 8.4–10.5)
CHLORIDE SERPL-SCNC: 99 MMOL/L — SIGNIFICANT CHANGE UP (ref 96–108)
CO2 SERPL-SCNC: 24 MMOL/L — SIGNIFICANT CHANGE UP (ref 22–31)
CREAT SERPL-MCNC: 0.72 MG/DL — SIGNIFICANT CHANGE UP (ref 0.5–1.3)
GLUCOSE BLDC GLUCOMTR-MCNC: 106 MG/DL — HIGH (ref 70–99)
GLUCOSE BLDC GLUCOMTR-MCNC: 112 MG/DL — HIGH (ref 70–99)
GLUCOSE BLDC GLUCOMTR-MCNC: 123 MG/DL — HIGH (ref 70–99)
GLUCOSE BLDC GLUCOMTR-MCNC: 81 MG/DL — SIGNIFICANT CHANGE UP (ref 70–99)
GLUCOSE SERPL-MCNC: 131 MG/DL — HIGH (ref 70–99)
HCT VFR BLD CALC: 26.5 % — LOW (ref 39–50)
HGB BLD-MCNC: 8.3 G/DL — LOW (ref 13–17)
INR BLD: 1.14 RATIO — SIGNIFICANT CHANGE UP (ref 0.88–1.16)
MAGNESIUM SERPL-MCNC: 1.7 MG/DL — SIGNIFICANT CHANGE UP (ref 1.6–2.6)
MCHC RBC-ENTMCNC: 31.2 PG — SIGNIFICANT CHANGE UP (ref 27–34)
MCHC RBC-ENTMCNC: 31.3 GM/DL — LOW (ref 32–36)
MCV RBC AUTO: 99.6 FL — SIGNIFICANT CHANGE UP (ref 80–100)
NRBC # BLD: 0 /100 WBCS — SIGNIFICANT CHANGE UP (ref 0–0)
PHOSPHATE SERPL-MCNC: 3.4 MG/DL — SIGNIFICANT CHANGE UP (ref 2.5–4.5)
PLATELET # BLD AUTO: 205 K/UL — SIGNIFICANT CHANGE UP (ref 150–400)
POTASSIUM SERPL-MCNC: 4.2 MMOL/L — SIGNIFICANT CHANGE UP (ref 3.5–5.3)
POTASSIUM SERPL-SCNC: 4.2 MMOL/L — SIGNIFICANT CHANGE UP (ref 3.5–5.3)
PROT SERPL-MCNC: 5.9 G/DL — LOW (ref 6–8.3)
PROTHROM AB SERPL-ACNC: 13.6 SEC — SIGNIFICANT CHANGE UP (ref 10.6–13.6)
RBC # BLD: 2.66 M/UL — LOW (ref 4.2–5.8)
RBC # FLD: 14.7 % — HIGH (ref 10.3–14.5)
SODIUM SERPL-SCNC: 133 MMOL/L — LOW (ref 135–145)
WBC # BLD: 7.25 K/UL — SIGNIFICANT CHANGE UP (ref 3.8–10.5)
WBC # FLD AUTO: 7.25 K/UL — SIGNIFICANT CHANGE UP (ref 3.8–10.5)

## 2021-11-02 PROCEDURE — 71045 X-RAY EXAM CHEST 1 VIEW: CPT | Mod: 26

## 2021-11-02 PROCEDURE — 99233 SBSQ HOSP IP/OBS HIGH 50: CPT

## 2021-11-02 PROCEDURE — 99232 SBSQ HOSP IP/OBS MODERATE 35: CPT

## 2021-11-02 RX ORDER — METOPROLOL TARTRATE 50 MG
25 TABLET ORAL EVERY 12 HOURS
Refills: 0 | Status: DISCONTINUED | OUTPATIENT
Start: 2021-11-02 | End: 2021-11-15

## 2021-11-02 RX ORDER — MAGNESIUM SULFATE 500 MG/ML
2 VIAL (ML) INJECTION ONCE
Refills: 0 | Status: COMPLETED | OUTPATIENT
Start: 2021-11-02 | End: 2021-11-03

## 2021-11-02 RX ORDER — OXYCODONE HYDROCHLORIDE 5 MG/1
10 TABLET ORAL EVERY 4 HOURS
Refills: 0 | Status: DISCONTINUED | OUTPATIENT
Start: 2021-11-02 | End: 2021-11-08

## 2021-11-02 RX ORDER — OXYCODONE HYDROCHLORIDE 5 MG/1
5 TABLET ORAL
Refills: 0 | Status: DISCONTINUED | OUTPATIENT
Start: 2021-11-02 | End: 2021-11-09

## 2021-11-02 RX ORDER — FUROSEMIDE 40 MG
40 TABLET ORAL DAILY
Refills: 0 | Status: DISCONTINUED | OUTPATIENT
Start: 2021-11-02 | End: 2021-11-15

## 2021-11-02 RX ORDER — ACETAMINOPHEN 500 MG
650 TABLET ORAL EVERY 6 HOURS
Refills: 0 | Status: DISCONTINUED | OUTPATIENT
Start: 2021-11-02 | End: 2021-11-04

## 2021-11-02 RX ADMIN — Medication 25 MILLIGRAM(S): at 17:20

## 2021-11-02 RX ADMIN — HEPARIN SODIUM 5000 UNIT(S): 5000 INJECTION INTRAVENOUS; SUBCUTANEOUS at 06:12

## 2021-11-02 RX ADMIN — LATANOPROST 1 DROP(S): 0.05 SOLUTION/ DROPS OPHTHALMIC; TOPICAL at 21:08

## 2021-11-02 RX ADMIN — OXYCODONE HYDROCHLORIDE 10 MILLIGRAM(S): 5 TABLET ORAL at 12:35

## 2021-11-02 RX ADMIN — Medication 25 MILLIGRAM(S): at 06:12

## 2021-11-02 RX ADMIN — LIDOCAINE 1 PATCH: 4 CREAM TOPICAL at 21:07

## 2021-11-02 RX ADMIN — Medication 0.25 MILLIGRAM(S): at 21:08

## 2021-11-02 RX ADMIN — OXYCODONE HYDROCHLORIDE 5 MILLIGRAM(S): 5 TABLET ORAL at 21:37

## 2021-11-02 RX ADMIN — DORZOLAMIDE HYDROCHLORIDE TIMOLOL MALEATE 1 DROP(S): 20; 5 SOLUTION/ DROPS OPHTHALMIC at 17:22

## 2021-11-02 RX ADMIN — Medication 40 MILLIGRAM(S): at 13:48

## 2021-11-02 RX ADMIN — ONDANSETRON 4 MILLIGRAM(S): 8 TABLET, FILM COATED ORAL at 12:04

## 2021-11-02 RX ADMIN — AMIODARONE HYDROCHLORIDE 400 MILLIGRAM(S): 400 TABLET ORAL at 06:12

## 2021-11-02 RX ADMIN — LIDOCAINE 1 PATCH: 4 CREAM TOPICAL at 05:05

## 2021-11-02 RX ADMIN — CHLORHEXIDINE GLUCONATE 1 APPLICATION(S): 213 SOLUTION TOPICAL at 06:13

## 2021-11-02 RX ADMIN — OXYCODONE HYDROCHLORIDE 10 MILLIGRAM(S): 5 TABLET ORAL at 12:05

## 2021-11-02 RX ADMIN — SODIUM CHLORIDE 20 MILLILITER(S): 9 INJECTION, SOLUTION INTRAVENOUS at 12:04

## 2021-11-02 RX ADMIN — OXYCODONE HYDROCHLORIDE 10 MILLIGRAM(S): 5 TABLET ORAL at 20:02

## 2021-11-02 RX ADMIN — HEPARIN SODIUM 5000 UNIT(S): 5000 INJECTION INTRAVENOUS; SUBCUTANEOUS at 21:06

## 2021-11-02 RX ADMIN — Medication 6 MILLIGRAM(S): at 21:06

## 2021-11-02 RX ADMIN — DORZOLAMIDE HYDROCHLORIDE TIMOLOL MALEATE 1 DROP(S): 20; 5 SOLUTION/ DROPS OPHTHALMIC at 06:13

## 2021-11-02 RX ADMIN — PANTOPRAZOLE SODIUM 40 MILLIGRAM(S): 20 TABLET, DELAYED RELEASE ORAL at 06:12

## 2021-11-02 RX ADMIN — FENTANYL CITRATE 10 MICROGRAM(S): 50 INJECTION INTRAVENOUS at 05:33

## 2021-11-02 RX ADMIN — Medication 1 DROP(S): at 17:21

## 2021-11-02 RX ADMIN — HEPARIN SODIUM 5000 UNIT(S): 5000 INJECTION INTRAVENOUS; SUBCUTANEOUS at 13:48

## 2021-11-02 RX ADMIN — FENTANYL CITRATE 30 MILLILITER(S): 50 INJECTION INTRAVENOUS at 07:19

## 2021-11-02 RX ADMIN — AMIODARONE HYDROCHLORIDE 400 MILLIGRAM(S): 400 TABLET ORAL at 17:20

## 2021-11-02 RX ADMIN — OXYCODONE HYDROCHLORIDE 5 MILLIGRAM(S): 5 TABLET ORAL at 21:07

## 2021-11-02 RX ADMIN — OXYCODONE HYDROCHLORIDE 10 MILLIGRAM(S): 5 TABLET ORAL at 19:32

## 2021-11-02 NOTE — PROGRESS NOTE ADULT - ASSESSMENT
80M w/ complex cardiac comorbidities and hx of pancreatic cancer now s/p uncomplicated whipple procedure. Patient transferred to SICU for close immediate monse-operative monitoring.     81 y/o male w/ a PMHx of CAD s/p stents x2, HFrEF of ~35% s/p AICD, s/p TAVR, HTN, HLD, bilateral PEs, MSSA bacteremia secondary to right foot osteomyelitis s/p debridement, GERD, BPH, anxiety, insomnia, spinal stenosis, macular degeneration, left KARIE w/ revision x2, and pancreatic CA s/p neoadjuvant chemotherapy presenting s/p Whipple w/ a post-op course c/b hypovolemic shock, episodes of NSVT, and an allergic reaction questionable Dilaudid, re-admit to SICU for s/p shock for arrythmia.    Neuro: acute post-op pain, anxiety, insomnia  - Multimodal pain control with lidocaine patch, and a fentanyl PCA  - Home melatonin for insomnia    Resp: no acute issues  - Out of bed to chair, ambulate as tolerated, and incentive spirometry to prevent atelectasis    CV: s/p shock, for arrythmia  Hx of CAD, HFrEF, s/p TAVR, HTN, hypovolemic shock (resolved), episodes of NSVT  -Monitor vital signs  -Amiodarone starting today at 400 mg PID x 7 days  -Metoprolol 25mg BID  -Sotalol discontinued  -Keep K>4, Mg >2  -EP/Cardiology following      GI: s/p Whipple POD #7,  GERD  - Full liquid diet; will advance diet as per primary team/Whipple pathway  - Protonix daily  - Will continue to monitor drain output and daily drain amylase levels   - Zofran PRN nausea/vomiting    Renal: BPH  - Monitor I&Os and electrolytes w/ repletions as necessary  - LR at 20 mL/hr as KVO for fentanyl PCA    Heme:   - SQH for VTE prophylaxis    ID:   - Monitor for clinical evidence of active infection    Endo: HLD , HgbA1C 5.3% on 9/22  - Monitor glucose w/ ISS before meals & at bedtime for glycemic control    Misc: allergic reaction    Code Status: Full Code    Disposition: Will Remain in SICU

## 2021-11-02 NOTE — PROGRESS NOTE ADULT - ATTENDING COMMENTS
Secondary prevention ICD with recent shock for MMVT versus AT (not on telemetry at the time and not clear by intracardiac tracings). Discontinuing low dose sotalol and beginning amiodarone load.

## 2021-11-02 NOTE — PROGRESS NOTE ADULT - SUBJECTIVE AND OBJECTIVE BOX
Day __ of Anesthesia Pain Management Service    SUBJECTIVE: I'm doing ok    Pain Scale Score:	[X] Refer to charted pain scores    THERAPY:  [X ] IV PCA Fentany 50 micrograms/mL    Demand dose: 10 mcg     Lockout: 6 minutes   Continuous Rate: 0 mcg/hr  4 Hour Limit: 300 mcg    MEDICATIONS  (STANDING):  aMIOdarone    Tablet 400 milliGRAM(s) Oral every 12 hours  chlorhexidine 2% Cloths 1 Application(s) Topical <User Schedule>  dorzolamide 2%/timolol 0.5% Ophthalmic Solution 1 Drop(s) Both EYES two times a day  fentaNYL PCA (50 MICROgram(s)/mL) 30 milliLiter(s) PCA Continuous PCA Continuous  heparin   Injectable 5000 Unit(s) SubCutaneous every 8 hours  influenza   Vaccine 0.5 milliLiter(s) IntraMuscular once  insulin lispro (ADMELOG) corrective regimen sliding scale   SubCutaneous three times a day before meals  insulin lispro (ADMELOG) corrective regimen sliding scale   SubCutaneous at bedtime  ketorolac 0.5% Ophthalmic Solution 1 Drop(s) Both EYES daily  lactated ringers. 1000 milliLiter(s) (20 mL/Hr) IV Continuous <Continuous>  latanoprost 0.005% Ophthalmic Solution 1 Drop(s) Both EYES at bedtime  lidocaine   4% Patch 1 Patch Transdermal daily  melatonin 6 milliGRAM(s) Oral at bedtime  metoprolol tartrate 25 milliGRAM(s) Oral every 12 hours  pantoprazole    Tablet 40 milliGRAM(s) Oral before breakfast    MEDICATIONS  (PRN):  ALPRAZolam 0.25 milliGRAM(s) Oral three times a day PRN Anxiety  diphenhydrAMINE Injectable 25 milliGRAM(s) IV Push every 4 hours PRN Pruritus  fentaNYL  PCA (50 MICROgram(s)/mL) Rescue Clinician Bolus 10 MICROGram(s) IV Push every 15 minutes PRN for Pain Scale GREATER THAN 6  naloxone Injectable 0.1 milliGRAM(s) IV Push every 3 minutes PRN For ANY of the following changes in patient status:  A. RR LESS THAN 10 breaths per minute, B. Oxygen saturation LESS THAN 90%, C. Sedation score of 6  ondansetron Injectable 4 milliGRAM(s) IV Push every 6 hours PRN Nausea  oxyCODONE    IR 5 milliGRAM(s) Oral every 3 hours PRN Moderate Pain (4 - 6)  oxyCODONE    IR 10 milliGRAM(s) Oral every 4 hours PRN Severe Pain (7 - 10)      OBJECTIVE:    Sedation Score:	[ X] Alert 	[ ] Drowsy 	[ ] Arousable	[ ] Asleep	[ ] Unresponsive    Side Effects:	[X ] None	[ ] Nausea	[ ] Vomiting	[ ] Pruritus  		[ ] Other:    Vital Signs Last 24 Hrs  T(C): 36.6 (02 Nov 2021 07:00), Max: 36.6 (02 Nov 2021 07:00)  T(F): 97.8 (02 Nov 2021 07:00), Max: 97.8 (02 Nov 2021 07:00)  HR: 74 (02 Nov 2021 09:00) (72 - 88)  BP: 128/64 (02 Nov 2021 09:00) (111/66 - 151/87)  BP(mean): 88 (02 Nov 2021 09:00) (79 - 111)  RR: 21 (02 Nov 2021 09:00) (16 - 31)  SpO2: 99% (02 Nov 2021 09:00) (88% - 99%)    ASSESSMENT/ PLAN    Therapy to  be:               [X] Continued   [ ] Discontinued   [ ] Changed to PRN Analgesics    Documentation and Verification of current medications:   [X] Done	[ ] Not done, not eligible    Comments: Events from yesterday noted. PCA use 4-6x/hr. Endorsing mild incisional pain. Needs longer acting analgesics than the IV fentanyl. Tolerating po, will add Oxycodone 5-10mg po q3-4hrs prn. Plan to D\C PCA later today

## 2021-11-02 NOTE — PROGRESS NOTE ADULT - ASSESSMENT
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 He required icd extraction, and a subq icd was replaced. He is now s/p Whipple procedure    - now off of pressor support with improved BP  - yesterday with sustained vt and subsequent icd shock, possibly in the setting of volume overload  - sotalol stopped, and now on amio load  - trend lfts  - cont metoprolol, with goal to titrate up as tolerated  - brief nsvt noted on telemetry, cont to monitor    - known history of systolic hf (mod lv dysfunction and mod ms)  - echo 10/27 with moderate ms at HR 80, tavr in place. severe LV dysfunction  - appears more compensated from a hf perspective  - would start him on po diuretics (suggest 40 mg po daily)  - Please continue to maintain strict I/Os, monitor daily weights, Cr, and K.   - entresto remains on hold, and will eventually restart it    - history of PE in 7/2021, and had been on xarelto.  is at elevated risk of vte noting pancreatic malignancy and relatively recent pe  - resume ac when safe from a surgical perspective. is presently on dvt ppx sq heparin    - Other cardiovascular workup will depend on clinical course.  - will follow with you

## 2021-11-02 NOTE — PROGRESS NOTE ADULT - SUBJECTIVE AND OBJECTIVE BOX
SICU Progress Note  =====================================================  HPI: 80M w/ PMHx pancreatic cancer (dx 3/2021, s/p neoadjuvant therapy), HTN, HLD, CHF (EF: 35%), CAD s/p stents x2 (~15 years ago), AICD (implanted 2005, extracted and re-implantation 9/2021), TAVR (4/2021), bilateral PE (7/2021) on Xarelto, spinal stenosis, macular degeneration, GERD, BPH, Left THR (x2 revisions), left femur fracture (hardware), 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 Patient presents for a scheduled whipple procedure by Dr. Mercer. On 11/1, RRT called for patient who was symptomatic from narrow complex tachycardia (patient shocked by AICD, which was interrogated). SICU consulted for close monitoring.     Surgery Information  OR time: 6hr 57min     EBL:  200         IV Fluids: 1L NS  and   3.4L Normosol     Blood Products: None    UOP: 435      PAST MEDICAL & SURGICAL HISTORY:  HTN (hypertension)  HLD (hyperlipidemia)  GERD (gastroesophageal reflux disease)  Cardiomyopathy  MSSA bacteremia  9/2021  Acute osteomyelitis  Pulmonary embolism  Pancreas cancer  chemo  Lime (hard of hearing)  B/L hearing aids  History of total hip replacement  left X 1, 2 revisions  History of laminectomy  X3  ICD (implantable cardiac defibrillator) in place  implanted 2005, replaced 9/2021  Benign testicular tumor  radiation  Status post amputation of toe of right foot  8/2021  Status post fracture of femur  2017 left with hardware  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  H/O Whipple procedure  2/2021    Home Meds:  ALPRAZolam 0.25 mg oral tablet: 1 tab(s) orally 3 times a day, As Needed (26 Oct 2021 07:19)  cholecalciferol 400 intl units (10 mcg) oral tablet: 1 tab(s) orally once a day (26 Oct 2021 07:19)  Coenzyme Q10 200 mg oral capsule: 1 tab(s) orally once a day (26 Oct 2021 07:19)  dorzolamide-timolol 2.23%-0.68% ophthalmic solution: 1 drop(s) to each affected eye 2 times a day (26 Oct 2021 07:19)  DULoxetine 60 mg oral delayed release capsule: 1 cap(s) orally once a day (26 Oct 2021 07:19)  gabapentin 300 mg oral tablet: 1 tab(s) orally 2 times a day (26 Oct 2021 07:19)  Keflex 500 mg oral capsule: 1 cap(s) orally every 12 hours (26 Oct 2021 07:19)  ketorolac 0.5% ophthalmic solution: 1 drop(s) to each affected eye once a day (26 Oct 2021 07:19)  latanoprost 0.005% ophthalmic solution: 1 drop(s) to each affected eye once a day (in the evening) (26 Oct 2021 07:19)  melatonin 3 mg oral tablet: 2 tab(s) orally once a day (at bedtime) (26 Oct 2021 07:19)  Multiple Vitamins oral tablet: 1 tab(s) orally once a day (26 Oct 2021 07:19)  oxyCODONE 10 mg oral tablet: 1 tab(s) orally every 6 hours, As Needed (26 Oct 2021 07:19)  pancrelipase 36,000 units-114,000 units-180,000 units oral delayed release capsule: 2 cap(s) orally 3 times a day (26 Oct 2021 07:19)  sacubitril-valsartan 24 mg-26 mg oral tablet: 1 tab(s) orally 2 times a day (26 Oct 2021 07:19)  Senna 8.6 mg oral tablet: 2 tab(s) orally once a day (at bedtime) (26 Oct 2021 07:19)  simethicone 80 mg oral tablet: 2 tab(s) orally once a day (at bedtime), As Needed (26 Oct 2021 07:19)  sotalol 80 mg oral tablet: 0.5 tab(s) orally 2 times a day (26 Oct 2021 07:19)  tamsulosin 0.4 mg oral capsule: 1 cap(s) orally once a day (26 Oct 2021 07:19)  Vitamin B6 100 mg oral tablet: 1 tab(s) orally once a day (26 Oct 2021 07:19)    Allergies:   No Known Allergies  Intolerances    Soc:   Advanced Directives: Presumed Full Code     ROS:    REVIEW OF SYSTEMS  [X] A ten-point review of systems was otherwise negative except as noted.  [X] Due to altered mental status/intubation, subjective information were not able to be obtained from the patient. History was obtained, to the extent possible, from review of the chart and collateral sources of information.    CURRENT MEDICATIONS:   --------------------------------------------------------------------------------------  Neurologic Medications  fentaNYL    Injectable 50 MICROGram(s) IV Push every 5 minutes PRN Severe Pain (7 - 10)  HYDROmorphone PCA (1 mG/mL) 30 milliLiter(s) PCA Continuous PCA Continuous  HYDROmorphone PCA (1 mG/mL) Rescue Clinician Bolus 0.5 milliGRAM(s) IV Push every 15 minutes PRN for Pain Scale GREATER THAN 6  methadone Injectable 50 milliGRAM(s) IV Push once  ondansetron Injectable 4 milliGRAM(s) IV Push every 6 hours PRN Nausea  ondansetron Injectable 4 milliGRAM(s) IV Push once PRN Nausea and/or Vomiting    Respiratory Medications  diphenhydrAMINE Injectable 25 milliGRAM(s) IV Push every 4 hours PRN Pruritus    Cardiovascular Medications    Gastrointestinal Medications    Genitourinary Medications    Hematologic/Oncologic Medications  influenza   Vaccine 0.5 milliLiter(s) IntraMuscular once    Antimicrobial/Immunologic Medications  cefoTEtan  IVPB 2 Gram(s) IV Intermittent once    Endocrine/Metabolic Medications    Topical/Other Medications  naloxone Injectable 0.1 milliGRAM(s) IV Push every 3 minutes PRN For ANY of the following changes in patient status:  A. RR LESS THAN 10 breaths per minute, B. Oxygen saturation LESS THAN 90%, C. Sedation score of 6  --------------------------------------------------------------------------------------  VITAL SIGNS, INS/OUTS (last 24 hours):  --------------------------------------------------------------------------------------  ICU Vital Signs Last 24 Hrs  T(C): 36.2 (01 Nov 2021 23:00), Max: 36.9 (01 Nov 2021 05:17)  T(F): 97.2 (01 Nov 2021 23:00), Max: 98.4 (01 Nov 2021 05:17)  HR: 78 (02 Nov 2021 01:00) (72 - 84)  BP: 151/87 (02 Nov 2021 01:00) (115/55 - 151/87)  BP(mean): 111 (02 Nov 2021 01:00) (79 - 111)    RR: 22 (02 Nov 2021 01:00) (16 - 31)  SpO2: 95% (02 Nov 2021 01:00) (88% - 99%)    -------------------------------------------------------------------------------------  EXAM:  General/Neuro  Exam: Normal, NAD, alert, oriented x 3, no focal deficits. PERRLA     Respiratory  Exam: Lungs clear to auscultation, Normal expansion/effort.     Cardiovascular  Exam: S1, S2.  Regular rate and rhythm.  Peripheral edema    Cardiac Rhythm: Normal Sinus Rhythm    GI  Exam: Abdomen soft, Non-tender, Non-distended. Prevena VAC in place on midline laparotomy incision. 2 Eliseo drains in left lateral upper quadrant.    Tubes/Lines/Drains  [x] Peripheral IV  [] Central Venous Line     	[] R	[] L	[] IJ	[] Fem	[] SC        Type:	    Date Placed:   [X] Arterial Line		[] R	[X] L	[] Fem	[X] Rad	[] Ax	Date Placed:   [X] PICC:         	[] Midline		[] Mediport           [X] Urinary Catheter		Date Placed: 10/26/2021    Extremities  Exam: Extremities warm, pink, well-perfused.      Derm:  Exam: Good skin turgor, no skin breakdown.      :   Exam: Jimenez catheter in place.     LABS  --------------------------------------------------------------------------------------  ABG - ( 26 Oct 2021 13:29 )  pH, Arterial: 7.38  pH, Blood: x     /  pCO2: 36    /  pO2: 193   / HCO3: 21    / Base Excess: -3.4  /  SaO2: 98.4    -------------------------------------------------------------------------------------- SICU Daily Progress Note  =====================================================  Interval/Overnight Events:     ***    POD #          	SICU Day #    ***    HPI:   HPI:  Mr. Gatica is a 80 year old male (from [facility]) with PMH pancreatic cancer (dx 3/2021, chemo) s/p ERCP s/p whipple 3/2021, HTN, HLD, CHF (EF: 35%), CAD s/p stents x2 (~15 years ago), AICD (implanted 2005, extracted and re-implantation 9/2021), TAVR (4/2021), bilateral PE (7/2021) on Xarelto, spinal stenosis, macular degeneration, GERD, BPH, Left THR (x2 revisions), left femur fracture (hardware), Osteomyelitis right foot s/p right hallux amputation 9/2021, MSSA bacteremia, explantation and reimplantation of AICD 10/4/21, PICC Line RUE was on IV antibiotics, recently changed to PO. Pt denies n/v, abdominal pain, or changes in bowel habits. Pt evaluated by Dr. Mercer for a scheduled Whipple procedure on 10/26/21. Pt denies recent travel or sick contact.     **Covid swab on 10/23/21 at [facility]   (20 Oct 2021 16:43)      PMH:   ***    Allergies: Dilaudid (Anaphylaxis; Hives)      MEDICATIONS:   --------------------------------------------------------------------------------------  Neurologic Medications  ALPRAZolam 0.25 milliGRAM(s) Oral three times a day PRN Anxiety  fentaNYL  PCA (50 MICROgram(s)/mL) Rescue Clinician Bolus 10 MICROGram(s) IV Push every 15 minutes PRN for Pain Scale GREATER THAN 6  fentaNYL PCA (50 MICROgram(s)/mL) 30 milliLiter(s) PCA Continuous PCA Continuous  melatonin 6 milliGRAM(s) Oral at bedtime  ondansetron Injectable 4 milliGRAM(s) IV Push every 6 hours PRN Nausea    Respiratory Medications  diphenhydrAMINE Injectable 25 milliGRAM(s) IV Push every 4 hours PRN Pruritus    Cardiovascular Medications  aMIOdarone    Tablet 400 milliGRAM(s) Oral every 12 hours  metoprolol tartrate 25 milliGRAM(s) Oral every 12 hours    Gastrointestinal Medications  lactated ringers. 1000 milliLiter(s) IV Continuous <Continuous>  pantoprazole    Tablet 40 milliGRAM(s) Oral before breakfast    Genitourinary Medications    Hematologic/Oncologic Medications  heparin   Injectable 5000 Unit(s) SubCutaneous every 8 hours  influenza   Vaccine 0.5 milliLiter(s) IntraMuscular once    Antimicrobial/Immunologic Medications    Endocrine/Metabolic Medications  insulin lispro (ADMELOG) corrective regimen sliding scale   SubCutaneous three times a day before meals  insulin lispro (ADMELOG) corrective regimen sliding scale   SubCutaneous at bedtime    Topical/Other Medications  chlorhexidine 2% Cloths 1 Application(s) Topical <User Schedule>  dorzolamide 2%/timolol 0.5% Ophthalmic Solution 1 Drop(s) Both EYES two times a day  ketorolac 0.5% Ophthalmic Solution 1 Drop(s) Both EYES daily  latanoprost 0.005% Ophthalmic Solution 1 Drop(s) Both EYES at bedtime  lidocaine   4% Patch 1 Patch Transdermal daily  naloxone Injectable 0.1 milliGRAM(s) IV Push every 3 minutes PRN For ANY of the following changes in patient status:  A. RR LESS THAN 10 breaths per minute, B. Oxygen saturation LESS THAN 90%, C. Sedation score of 6    --------------------------------------------------------------------------------------    VITAL SIGNS, INS/OUTS (last 24 hours):  --------------------------------------------------------------------------------------  ((Insert SICU Vitals/Is+Os here))***  --------------------------------------------------------------------------------------    EXAM  NEUROLOGY  RASS:   	GCS:    Exam: Normal, NAD, alert, oriented x3, no focal deficits. ***    HEENT  Exam: Normocephalic, atraumatic, EOMI.  ***    RESPIRATORY  Exam: Lungs clear to auscultation, Normal expansion/effort. ***  Mechanical Ventilation:     CARDIOVASCULAR  Exam: S1, S2.  Regular rate and rhythm.   ***    GI/NUTRITION  Exam: Abdomen soft, Non-tender, Non-distended.  ***  Wound:  ***  Current Diet:  NPO***    VASCULAR  Exam: Extremities warm, pink, well-perfused. ***    MUSCULOSKELETAL  Exam: All extremities moving spontaneously without limitations. ***    SKIN  Exam: Good skin turgor, no skin breakdown. ***    METABOLIC/FLUIDS/ELECTROLYTES  lactated ringers. 1000 milliLiter(s) IV Continuous <Continuous>      HEMATOLOGIC  [x] VTE Prophylaxis: heparin   Injectable 5000 Unit(s) SubCutaneous every 8 hours    Transfusions:	[] PRBC	[] Platelets		[] FFP	[] Cryoprecipitate    INFECTIOUS DISEASE  Antimicrobials/Immunologic Medications:  influenza   Vaccine 0.5 milliLiter(s) IntraMuscular once    Day #      of     ***    Tubes/Lines/Drains  ***  [x] Peripheral IV  [] Central Venous Line     	[] R	[] L	[] IJ	[] Fem	[] SC	Date Placed:   [] Arterial Line		[] R	[] L	[] Fem	[] Rad	[] Ax	Date Placed:   [] PICC		[] Midline		[] Mediport  [] Urinary Catheter		Date Placed:   [x] Necessity of urinary, arterial, and venous catheters discussed    LABS  --------------------------------------------------------------------------------------  ((Insert SICU Labs here))***  --------------------------------------------------------------------------------------    OTHER LABORATORY:     IMAGING STUDIES:   CXR:     ASSESSMENT:  80y Male    ((insert from previous))***    PLAN:   ***      --------------------------------------------------------------------------------------    Critical Care Diagnoses: HISTORY  81 y/o male w/ a PMHx of CAD s/p stents x2, HFrEF of ~35% s/p AICD, s/p TAVR, HTN, HLD, bilateral PEs, MSSA bacteremia secondary to right foot osteomyelitis s/p debridement, GERD, BPH, anxiety, insomnia, spinal stenosis, macular degeneration, left KARIE w/ revision x2, and pancreatic CA s/p neoadjuvant chemotherapy presenting s/p Whipple w/ a post-op course c/b hypovolemic shock, episodes of NSVT, and an allergic reaction questionable Dilaudid, re-admit to SICU for s/p shock for arrythmia.      24 HOUR EVENTS:  -RRT called for symptomatic narrow complex tachycardia (patient shocked by AICD)  -Admitted to SICU    SUBJECTIVE/ROS:  A ten-point review of systems was otherwise negative except as noted.      NEURO  Exam: awake, alert, oriented  Meds: ALPRAZolam 0.25 milliGRAM(s) Oral three times a day PRN Anxiety  fentaNYL  PCA (50 MICROgram(s)/mL) Rescue Clinician Bolus 10 MICROGram(s) IV Push every 15 minutes PRN for Pain Scale GREATER THAN 6  fentaNYL PCA (50 MICROgram(s)/mL) 30 milliLiter(s) PCA Continuous PCA Continuous  melatonin 6 milliGRAM(s) Oral at bedtime  ondansetron Injectable 4 milliGRAM(s) IV Push every 6 hours PRN Nausea      RESPIRATORY  RR: 22 (11-02-21 @ 01:00) (16 - 31)  SpO2: 95% (11-02-21 @ 01:00) (88% - 99%)  Meds: diphenhydrAMINE Injectable 25 milliGRAM(s) IV Push every 4 hours PRN Pruritus      CARDIOVASCULAR  HR: 78 (11-02-21 @ 01:00) (72 - 84)  BP: 151/87 (11-02-21 @ 01:00) (115/55 - 151/87)  BP(mean): 111 (11-02-21 @ 01:00) (79 - 111)    VBG - ( 01 Nov 2021 11:27 )  pH: 7.40  /  pCO2: 39    /  pO2: 36    / HCO3: 24    / Base Excess: -0.5  /  SaO2: 54.3   Lactate: 1.8      Cardiac Rhythm: sinus   Perfusion     [X ]Adequate   [ ]Inadequate  Mentation   [X]Normal       [ ]Reduced  Volume Status [ ]Hypervolemic [X ]Euvolemic [ ]Hypovolemic  Meds: aMIOdarone    Tablet 400 milliGRAM(s) Oral every 12 hours  metoprolol tartrate 25 milliGRAM(s) Oral every 12 hours      GI/NUTRITION  Exam: distended, mild tender,   Diet: Full Liquid Diet  Meds: pantoprazole  Tablet 40 milliGRAM(s) Oral before breakfast      GENITOURINARY  I&O's Detail    10-31 @ 07:01  -  11-01 @ 07:00  --------------------------------------------------------  IN:    IV PiggyBack: 150 mL    Lactated Ringers: 160 mL    Oral Fluid: 880 mL  Total IN: 1190 mL    OUT:    Bulb (mL): 265 mL    Voided (mL): 2100 mL  Total OUT: 2365 mL    Total NET: -1175 mL      11-01 @ 07:01  -  11-02 @ 02:08  --------------------------------------------------------  IN:    Lactated Ringers: 160 mL    Oral Fluid: 220 mL  Total IN: 380 mL    OUT:    Bulb (mL): 140 mL    Voided (mL): 850 mL  Total OUT: 990 mL    Total NET: -610 mL        11-01    136  |  103  |  15  ----------------------------<  115<H>  4.2   |  23  |  0.70    Ca    8.0<L>      01 Nov 2021 21:35  Phos  3.4     11-01  Mg     2.1     11-01    TPro  6.0  /  Alb  2.8<L>  /  TBili  0.7  /  DBili  x   /  AST  50<H>  /  ALT  35  /  AlkPhos  91  11-01    [X ] Jimenez catheter, indication: urine output  monitoring in critically ill patient  Meds: lactated ringers. 1000 milliLiter(s) IV Continuous <Continuous>      HEMATOLOGIC  [ X] VTE Prophylaxis -  Meds: heparin   Injectable 5000 Unit(s) SubCutaneous every 8 hours                        8.2    5.89  )-----------( 195      ( 01 Nov 2021 21:35 )             25.8     PT/INR - ( 01 Nov 2021 21:35 )   PT: 13.1 sec;   INR: 1.10 ratio         PTT - ( 01 Nov 2021 21:35 )  PTT:25.0 sec    INFECTIOUS DISEASES  T(C): 36.2 (11-01-21 @ 23:00), Max: 36.9 (11-01-21 @ 05:17)  WBC Count: 5.89 K/uL (11-01 @ 21:35)  WBC Count: 6.78 K/uL (11-01 @ 11:29)  WBC Count: 5.48 K/uL (11-01 @ 07:18)    Meds: influenza   Vaccine 0.5 milliLiter(s) IntraMuscular once      ENDOCRINE  Meds: insulin lispro (ADMELOG) corrective regimen sliding scale   SubCutaneous three times a day before meals  insulin lispro (ADMELOG) corrective regimen sliding scale   SubCutaneous at bedtime  POCT Blood Glucose.: 106 mg/dL   (11.01.21 @ 17:37)  POCT Blood Glucose.: 102 mg/dL (11.01.21 @ 11:08)       ACCESS DEVICES:  [ x] Peripheral IV  [ ] Central Venous Line	[ ] R	[ ] L	[ ] IJ	[ ] Fem	[ ] SC	Placed:   [ ] Arterial Line		[ ] R	[ ] L	[ ] Fem	[ ] Rad	[ ] Ax	Placed:   [ ] PICC:					[ ] Mediport  [X ] Urinary Catheter, Date Placed:   [X ] Necessity of urinary, arterial, and venous catheters discussed    OTHER MEDICATIONS:  chlorhexidine 2% Cloths 1 Application(s) Topical <User Schedule>  dorzolamide 2%/timolol 0.5% Ophthalmic Solution 1 Drop(s) Both EYES two times a day  ketorolac 0.5% Ophthalmic Solution 1 Drop(s) Both EYES daily  latanoprost 0.005% Ophthalmic Solution 1 Drop(s) Both EYES at bedtime  lidocaine   4% Patch 1 Patch Transdermal daily  naloxone Injectable 0.1 milliGRAM(s) IV Push every 3 minutes PRN      IMAGING: HISTORY  79 y/o male w/ a PMHx of CAD s/p stents x2, HFrEF of ~35% s/p AICD, s/p TAVR, HTN, HLD, bilateral PEs, MSSA bacteremia secondary to right foot osteomyelitis s/p debridement, GERD, BPH, anxiety, insomnia, spinal stenosis, macular degeneration, left KARIE w/ revision x2, and pancreatic CA s/p neoadjuvant chemotherapy presenting s/p Whipple w/ a post-op course c/b hypovolemic shock, episodes of NSVT, and an allergic reaction questionable Dilaudid, re-admit to SICU for s/p shock for arrythmia.      24 HOUR EVENTS:  -RRT called for symptomatic sustained VT (patient shocked by AICD) with subsequent conversion to sinus  -Admitted to SICU    SUBJECTIVE/ROS:  A ten-point review of systems was otherwise negative except as noted.      NEURO  Exam: awake, alert, oriented  Meds: ALPRAZolam 0.25 milliGRAM(s) Oral three times a day PRN Anxiety  fentaNYL  PCA (50 MICROgram(s)/mL) Rescue Clinician Bolus 10 MICROGram(s) IV Push every 15 minutes PRN for Pain Scale GREATER THAN 6  fentaNYL PCA (50 MICROgram(s)/mL) 30 milliLiter(s) PCA Continuous PCA Continuous  melatonin 6 milliGRAM(s) Oral at bedtime  ondansetron Injectable 4 milliGRAM(s) IV Push every 6 hours PRN Nausea      RESPIRATORY  RR: 22 (11-02-21 @ 01:00) (16 - 31)  SpO2: 95% (11-02-21 @ 01:00) (88% - 99%)  Meds: diphenhydrAMINE Injectable 25 milliGRAM(s) IV Push every 4 hours PRN Pruritus      CARDIOVASCULAR  HR: 78 (11-02-21 @ 01:00) (72 - 84)  BP: 151/87 (11-02-21 @ 01:00) (115/55 - 151/87)  BP(mean): 111 (11-02-21 @ 01:00) (79 - 111)    VBG - ( 01 Nov 2021 11:27 )  pH: 7.40  /  pCO2: 39    /  pO2: 36    / HCO3: 24    / Base Excess: -0.5  /  SaO2: 54.3   Lactate: 1.8      Cardiac Rhythm: sinus   Perfusion     [X ]Adequate   [ ]Inadequate  Mentation   [X]Normal       [ ]Reduced  Volume Status [ ]Hypervolemic [X ]Euvolemic [ ]Hypovolemic  Meds: aMIOdarone    Tablet 400 milliGRAM(s) Oral every 12 hours  metoprolol tartrate 25 milliGRAM(s) Oral every 12 hours      GI/NUTRITION  Exam: distended, mild tender,   Diet: Full Liquid Diet  Meds: pantoprazole  Tablet 40 milliGRAM(s) Oral before breakfast      GENITOURINARY  I&O's Detail    10-31 @ 07:01 - 11-01 @ 07:00  --------------------------------------------------------  IN:    IV PiggyBack: 150 mL    Lactated Ringers: 160 mL    Oral Fluid: 880 mL  Total IN: 1190 mL    OUT:    Bulb (mL): 265 mL    Voided (mL): 2100 mL  Total OUT: 2365 mL    Total NET: -1175 mL      11-01 @ 07:01 - 11-02 @ 02:08  --------------------------------------------------------  IN:    Lactated Ringers: 160 mL    Oral Fluid: 220 mL  Total IN: 380 mL    OUT:    Bulb (mL): 140 mL    Voided (mL): 850 mL  Total OUT: 990 mL    Total NET: -610 mL        11-01    136  |  103  |  15  ----------------------------<  115<H>  4.2   |  23  |  0.70    Ca    8.0<L>      01 Nov 2021 21:35  Phos  3.4     11-01  Mg     2.1     11-01    TPro  6.0  /  Alb  2.8<L>  /  TBili  0.7  /  DBili  x   /  AST  50<H>  /  ALT  35  /  AlkPhos  91  11-01    [X ] Jimenez catheter, indication: urine output  monitoring in critically ill patient  Meds: lactated ringers. 1000 milliLiter(s) IV Continuous <Continuous>      HEMATOLOGIC  [ X] VTE Prophylaxis -  Meds: heparin   Injectable 5000 Unit(s) SubCutaneous every 8 hours                        8.2    5.89  )-----------( 195      ( 01 Nov 2021 21:35 )             25.8     PT/INR - ( 01 Nov 2021 21:35 )   PT: 13.1 sec;   INR: 1.10 ratio         PTT - ( 01 Nov 2021 21:35 )  PTT:25.0 sec    INFECTIOUS DISEASES  T(C): 36.2 (11-01-21 @ 23:00), Max: 36.9 (11-01-21 @ 05:17)  WBC Count: 5.89 K/uL (11-01 @ 21:35)  WBC Count: 6.78 K/uL (11-01 @ 11:29)  WBC Count: 5.48 K/uL (11-01 @ 07:18)    Meds: influenza   Vaccine 0.5 milliLiter(s) IntraMuscular once      ENDOCRINE  Meds: insulin lispro (ADMELOG) corrective regimen sliding scale   SubCutaneous three times a day before meals  insulin lispro (ADMELOG) corrective regimen sliding scale   SubCutaneous at bedtime  POCT Blood Glucose.: 106 mg/dL   (11.01.21 @ 17:37)  POCT Blood Glucose.: 102 mg/dL (11.01.21 @ 11:08)       ACCESS DEVICES:  [ x] Peripheral IV  [ ] Central Venous Line	[ ] R	[ ] L	[ ] IJ	[ ] Fem	[ ] SC	Placed:   [ ] Arterial Line		[ ] R	[ ] L	[ ] Fem	[ ] Rad	[ ] Ax	Placed:   [ ] PICC:					[ ] Mediport  [X ] Urinary Catheter, Date Placed:   [X ] Necessity of urinary, arterial, and venous catheters discussed    OTHER MEDICATIONS:  chlorhexidine 2% Cloths 1 Application(s) Topical <User Schedule>  dorzolamide 2%/timolol 0.5% Ophthalmic Solution 1 Drop(s) Both EYES two times a day  ketorolac 0.5% Ophthalmic Solution 1 Drop(s) Both EYES daily  latanoprost 0.005% Ophthalmic Solution 1 Drop(s) Both EYES at bedtime  lidocaine   4% Patch 1 Patch Transdermal daily  naloxone Injectable 0.1 milliGRAM(s) IV Push every 3 minutes PRN      IMAGING:

## 2021-11-02 NOTE — PROGRESS NOTE ADULT - ATTENDING COMMENTS
80yr M hx of cardiac co morbidity , hx of pancreatic cancer, s/p whipple. extubated in PACU came to SICU off vasopressor support    alert and awake, following commands.   pain service consulted   NC O2, wean as tolerated  monitor for hemodynamics  PE hx, rivaroxaban on hold per primary for now  AICD extracted in s/o MSSA, new perc wires placed in left chest  may need EP tomorrow  NPO for now, on whipple post op pathway  monitor urine output  Mg repletion  hold SQH  amputation, clarify podiatry f/u  periop AB  glycemic control Pt is a 80 year old male with a medical history significant for pancreatic cancer, s/p whipple. On the floor, he had an episode of SVT. His AICD discharged, restoring NSR. He was admitted to the SICU for monitoring.    Continue pain control  NC O2, wean as tolerated  monitor for hemodynamics  Interrogate pacemaker  Metoprolol and sotalol per cardiology  Resume diet  monitor urine output  Mg repletion  resume xarelto for h/o PE  glycemic control

## 2021-11-02 NOTE — PROGRESS NOTE ADULT - ASSESSMENT
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 Pt also had an episode of pAT 9/26/21 which met rate for VT zone, received ATPx4 and 1 shock.   EP reconsulted for AICD firing 11/1 associated with lightheadedness/presyncope.     Interrogation with sustained VT with 1 appropriate shock s/p termination to sinus rhythm.   Sotalol discontinued 11/1. After 24 hour washout, please start Amiodarone:  - Loading dose: 400mg BID x1 weeks, THEN 200mg BID x1 week, THEN maintenance dose of 200mg once daily.   On Lopressor 25mg BID, can uptitrate as tolerated.   Please keep on tele. Keep K>4, Mg>2.   Pt on Xarelto for hx of bilateral PEs, restart when clear per primary team. 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 Pt also had an episode of pAT 9/26/21 which met rate for VT zone, received ATPx4 and 1 shock.   EP reconsulted for AICD firing 11/1 associated with lightheadedness/presyncope.     Interrogation with sustained VT with 1 appropriate shock s/p termination to sinus rhythm.   Sotalol discontinued 11/1. After 24 hour washout, please start Amiodarone:  - Loading dose: 400mg BID x1 weeks, THEN 200mg BID x1 week, THEN maintenance dose of 200mg once daily.   On Lopressor 25mg BID, can uptitrate as tolerated.     Please keep on tele, overnight sinus high 40-50s; 70-80s in AM, brief NSVT and pAT.    Keep K>4, Mg>2.     Pt on Xarelto for hx of bilateral PEs, restart when clear per primary team. 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 Pt also had an episode of pAT 9/26/21 which met rate for VT zone, received ATPx4 and 1 shock.   EP reconsulted for AICD firing 11/1 associated with lightheadedness/presyncope.     Interrogation with sustained VT with 1 appropriate shock s/p termination to sinus rhythm.   Sotalol discontinued 11/1. After 24 hour washout, please start Amiodarone:  - Loading dose: 400mg BID x1 weeks, THEN 200mg BID x1 week, THEN maintenance dose of 200mg once daily.   - Monitor LFTs/TFTs/PFTs while on Amiodarone therapy  On Lopressor 25mg BID, can uptitrate as tolerated.     Please keep on tele, overnight sinus high 40-50s; 70-80s in AM, brief NSVT and pAT.    Keep K>4, Mg>2.     Pt on Xarelto for hx of bilateral PEs, restart when clear per primary team.

## 2021-11-02 NOTE — PROGRESS NOTE ADULT - SUBJECTIVE AND OBJECTIVE BOX
24H hour events: Tele ***    MEDICATIONS:  aMIOdarone    Tablet 400 milliGRAM(s) Oral every 12 hours  heparin   Injectable 5000 Unit(s) SubCutaneous every 8 hours  metoprolol tartrate 25 milliGRAM(s) Oral every 12 hours  diphenhydrAMINE Injectable 25 milliGRAM(s) IV Push every 4 hours PRN  ALPRAZolam 0.25 milliGRAM(s) Oral three times a day PRN  fentaNYL  PCA (50 MICROgram(s)/mL) Rescue Clinician Bolus 10 MICROGram(s) IV Push every 15 minutes PRN  fentaNYL PCA (50 MICROgram(s)/mL) 30 milliLiter(s) PCA Continuous PCA Continuous  melatonin 6 milliGRAM(s) Oral at bedtime  ondansetron Injectable 4 milliGRAM(s) IV Push every 6 hours PRN  pantoprazole    Tablet 40 milliGRAM(s) Oral before breakfast  insulin lispro (ADMELOG) corrective regimen sliding scale   SubCutaneous three times a day before meals  insulin lispro (ADMELOG) corrective regimen sliding scale   SubCutaneous at bedtime  chlorhexidine 2% Cloths 1 Application(s) Topical <User Schedule>  dorzolamide 2%/timolol 0.5% Ophthalmic Solution 1 Drop(s) Both EYES two times a day  influenza   Vaccine 0.5 milliLiter(s) IntraMuscular once  ketorolac 0.5% Ophthalmic Solution 1 Drop(s) Both EYES daily  lactated ringers. 1000 milliLiter(s) IV Continuous <Continuous>  latanoprost 0.005% Ophthalmic Solution 1 Drop(s) Both EYES at bedtime  lidocaine   4% Patch 1 Patch Transdermal daily      REVIEW OF SYSTEMS:  Complete 10point ROS negative.    PHYSICAL EXAM:  T(C): 36.6 (11-02-21 @ 07:00), Max: 36.8 (11-01-21 @ 09:25)  HR: 81 (11-02-21 @ 07:00) (72 - 88)  BP: 143/65 (11-02-21 @ 07:00) (111/66 - 151/87)  RR: 24 (11-02-21 @ 07:00) (16 - 31)  SpO2: 95% (11-02-21 @ 07:00) (88% - 99%)  Wt(kg): --  I&O's Summary    01 Nov 2021 07:01  -  02 Nov 2021 07:00  --------------------------------------------------------  IN: 480 mL / OUT: 1040 mL / NET: -560 mL        Appearance: Normal	  Cardiovascular: Normal S1 S2, No JVD, No murmurs  Respiratory: Lungs clear to auscultation	  Psychiatry: A & O x 3, Mood & affect appropriate  Skin: No rashes, No ecchymoses, No cyanosis	  Neurologic: Non-focal  Extremities: No BLE edema      LABS:	 	    CBC Full  -  ( 01 Nov 2021 21:35 )  WBC Count : 5.89 K/uL  Hemoglobin : 8.2 g/dL  Hematocrit : 25.8 %  Platelet Count - Automated : 195 K/uL  Mean Cell Volume : 100.8 fl  Mean Cell Hemoglobin : 32.0 pg  Mean Cell Hemoglobin Concentration : 31.8 gm/dL      11-01    136  |  103  |  15  ----------------------------<  115<H>  4.2   |  23  |  0.70  11-01    135  |  100  |  16  ----------------------------<  144<H>  4.1   |  22  |  0.73    Ca    8.0<L>      01 Nov 2021 21:35  Ca    8.3<L>      01 Nov 2021 11:29  Phos  3.4     11-01  Phos  3.6     11-01  Mg     2.1     11-01  Mg     1.6     11-01    TPro  6.0  /  Alb  2.8<L>  /  TBili  0.7  /  DBili  x   /  AST  50<H>  /  ALT  35  /  AlkPhos  91  11-01  TPro  6.1  /  Alb  2.9<L>  /  TBili  0.8  /  DBili  x   /  AST  47<H>  /  ALT  38  /  AlkPhos  96  11-01    CARDIAC MARKERS: Troponin T, High Sensitivity (11.01.21 @ 21:35)    Troponin T, High Sensitivity Result: 84: Specimen not hemolyzed      TELEMETRY: 	    ECG:  	  RADIOLOGY: < from: Xray Chest 1 View AP/PA (11.01.21 @ 13:26) >  INDICATION: Shortness of breath.    IMPRESSION:    The heart is slightly enlarged. The lungs appear to be clear. No pleural effusion. No pneumothorax. Status post transaortic valve replacement. Orthopedic hardware is seen in the thoracolumbar spine. Calcified aortic knob. No change in the appearance of the chest when compared toprevious study done October 30, 2021 at 7:12 AM.    < end of copied text >      PREVIOUS DIAGNOSTIC TESTING:    [ ] Echocardiogram: < from: Transthoracic Echocardiogram (10.27.21 @ 07:59) >  Observations:  Mitral Valve: Mitral annular calcification and calcified  mitral leaflets with decreased diastolic opening. Mean  transmitral valve gradient equals 7 mm Hg, consistent with  moderate mitral stenosis. HR 80s  Aortic Valve/Aorta: Transcatheter aortic valve replacement.  Peak transaortic valve gradient equals 12 mm Hg, mean  transaortic valve gradient equals 7 mm Hg, which is  probably normal in the presence of a transcatheter aortic  valve replacement. No aortic valve regurgitation seen.  Aortic Root: 2.7 cm.  Left Atrium: Left atrium not well visualized.  Left Ventricle: Endocardial visualization enhanced with  intravenous injection of Ultrasonic Enhancing Agent  (Definity). Severe global left ventricular systolic  dysfunction. Mild left ventricular enlargement. Unable to  evaluate diastology. LVEF 30-35%  Right Heart: Moderate right atrial enlargement.  A device  wire is noted in the right heart. The right ventricle is  not well visualized; grossly normal right ventricular  systolic function. Normal tricuspid valve. Minimal  tricuspid regurgitation. Pulmonic valve not well  visualized.  Pericardium/Pleura: Normal pericardium with no pericardial  effusion.  Hemodynamic: Estimated right atrial pressure is 8 mm Hg.  Estimated right ventricular systolic pressure equals 42 mm  Hg, assuming right atrial pressure equals 8 mm Hg,  consistent with mild pulmonary hypertension.  ------------------------------------------------------------------------  Conclusions:  1. Mitral annular calcification and calcified mitral  leaflets with decreased diastolic opening. Mean transmitral  valve gradient equals 7 mm Hg, consistent with moderate  mitral stenosis. HR 80s  2. Transcatheter aortic valve replacement. Peaktransaortic  valve gradient equals 12 mm Hg, mean transaortic valve  gradient equals 7 mm Hg, which is probably normal in the  presence of a transcatheter aortic valve replacement. No  aortic valve regurgitation seen.  3. Endocardial visualization enhanced with intravenous  injection of Ultrasonic Enhancing Agent (Definity). Severe  global left ventricular systolic dysfunction.  4. The right ventricle is not well visualized; grossly  normal right ventricular systolic function.    < end of copied text >   24H hour events: Tele SR 70-80s, overnight sinus esau high 40s - 50s. Brief runs of NSVT and pAT. Pt asymptomatic. Denies chest pain, SOB, palpitations, dizziness.     MEDICATIONS:  aMIOdarone    Tablet 400 milliGRAM(s) Oral every 12 hours  heparin   Injectable 5000 Unit(s) SubCutaneous every 8 hours  metoprolol tartrate 25 milliGRAM(s) Oral every 12 hours  diphenhydrAMINE Injectable 25 milliGRAM(s) IV Push every 4 hours PRN  ALPRAZolam 0.25 milliGRAM(s) Oral three times a day PRN  fentaNYL  PCA (50 MICROgram(s)/mL) Rescue Clinician Bolus 10 MICROGram(s) IV Push every 15 minutes PRN  fentaNYL PCA (50 MICROgram(s)/mL) 30 milliLiter(s) PCA Continuous PCA Continuous  melatonin 6 milliGRAM(s) Oral at bedtime  ondansetron Injectable 4 milliGRAM(s) IV Push every 6 hours PRN  pantoprazole    Tablet 40 milliGRAM(s) Oral before breakfast  insulin lispro (ADMELOG) corrective regimen sliding scale   SubCutaneous three times a day before meals  insulin lispro (ADMELOG) corrective regimen sliding scale   SubCutaneous at bedtime  chlorhexidine 2% Cloths 1 Application(s) Topical <User Schedule>  dorzolamide 2%/timolol 0.5% Ophthalmic Solution 1 Drop(s) Both EYES two times a day  influenza   Vaccine 0.5 milliLiter(s) IntraMuscular once  ketorolac 0.5% Ophthalmic Solution 1 Drop(s) Both EYES daily  lactated ringers. 1000 milliLiter(s) IV Continuous <Continuous>  latanoprost 0.005% Ophthalmic Solution 1 Drop(s) Both EYES at bedtime  lidocaine   4% Patch 1 Patch Transdermal daily      REVIEW OF SYSTEMS:  Complete 10point ROS negative.    PHYSICAL EXAM:  T(C): 36.6 (11-02-21 @ 07:00), Max: 36.8 (11-01-21 @ 09:25)  HR: 81 (11-02-21 @ 07:00) (72 - 88)  BP: 143/65 (11-02-21 @ 07:00) (111/66 - 151/87)  RR: 24 (11-02-21 @ 07:00) (16 - 31)  SpO2: 95% (11-02-21 @ 07:00) (88% - 99%)  Wt(kg): --  I&O's Summary    01 Nov 2021 07:01  -  02 Nov 2021 07:00  --------------------------------------------------------  IN: 480 mL / OUT: 1040 mL / NET: -560 mL        Appearance: Normal	  Cardiovascular: Normal S1 S2, No JVD, No murmurs  Respiratory: Lungs clear to auscultation	  Psychiatry: A & O x 3, Mood & affect appropriate  Skin: No rashes, No ecchymoses, No cyanosis	  Neurologic: Non-focal  Extremities: No BLE edema      LABS:	 	    CBC Full  -  ( 01 Nov 2021 21:35 )  WBC Count : 5.89 K/uL  Hemoglobin : 8.2 g/dL  Hematocrit : 25.8 %  Platelet Count - Automated : 195 K/uL  Mean Cell Volume : 100.8 fl  Mean Cell Hemoglobin : 32.0 pg  Mean Cell Hemoglobin Concentration : 31.8 gm/dL      11-01    136  |  103  |  15  ----------------------------<  115<H>  4.2   |  23  |  0.70  11-01    135  |  100  |  16  ----------------------------<  144<H>  4.1   |  22  |  0.73    Ca    8.0<L>      01 Nov 2021 21:35  Ca    8.3<L>      01 Nov 2021 11:29  Phos  3.4     11-01  Phos  3.6     11-01  Mg     2.1     11-01  Mg     1.6     11-01    TPro  6.0  /  Alb  2.8<L>  /  TBili  0.7  /  DBili  x   /  AST  50<H>  /  ALT  35  /  AlkPhos  91  11-01  TPro  6.1  /  Alb  2.9<L>  /  TBili  0.8  /  DBili  x   /  AST  47<H>  /  ALT  38  /  AlkPhos  96  11-01    CARDIAC MARKERS: Troponin T, High Sensitivity (11.01.21 @ 21:35)    Troponin T, High Sensitivity Result: 84: Specimen not hemolyzed      TELEMETRY: 	    ECG:  	  RADIOLOGY: < from: Xray Chest 1 View AP/PA (11.01.21 @ 13:26) >  INDICATION: Shortness of breath.    IMPRESSION:    The heart is slightly enlarged. The lungs appear to be clear. No pleural effusion. No pneumothorax. Status post transaortic valve replacement. Orthopedic hardware is seen in the thoracolumbar spine. Calcified aortic knob. No change in the appearance of the chest when compared toprevious study done October 30, 2021 at 7:12 AM.    < end of copied text >      PREVIOUS DIAGNOSTIC TESTING:    [ ] Echocardiogram: < from: Transthoracic Echocardiogram (10.27.21 @ 07:59) >  Observations:  Mitral Valve: Mitral annular calcification and calcified  mitral leaflets with decreased diastolic opening. Mean  transmitral valve gradient equals 7 mm Hg, consistent with  moderate mitral stenosis. HR 80s  Aortic Valve/Aorta: Transcatheter aortic valve replacement.  Peak transaortic valve gradient equals 12 mm Hg, mean  transaortic valve gradient equals 7 mm Hg, which is  probably normal in the presence of a transcatheter aortic  valve replacement. No aortic valve regurgitation seen.  Aortic Root: 2.7 cm.  Left Atrium: Left atrium not well visualized.  Left Ventricle: Endocardial visualization enhanced with  intravenous injection of Ultrasonic Enhancing Agent  (Definity). Severe global left ventricular systolic  dysfunction. Mild left ventricular enlargement. Unable to  evaluate diastology. LVEF 30-35%  Right Heart: Moderate right atrial enlargement.  A device  wire is noted in the right heart. The right ventricle is  not well visualized; grossly normal right ventricular  systolic function. Normal tricuspid valve. Minimal  tricuspid regurgitation. Pulmonic valve not well  visualized.  Pericardium/Pleura: Normal pericardium with no pericardial  effusion.  Hemodynamic: Estimated right atrial pressure is 8 mm Hg.  Estimated right ventricular systolic pressure equals 42 mm  Hg, assuming right atrial pressure equals 8 mm Hg,  consistent with mild pulmonary hypertension.  ------------------------------------------------------------------------  Conclusions:  1. Mitral annular calcification and calcified mitral  leaflets with decreased diastolic opening. Mean transmitral  valve gradient equals 7 mm Hg, consistent with moderate  mitral stenosis. HR 80s  2. Transcatheter aortic valve replacement. Peaktransaortic  valve gradient equals 12 mm Hg, mean transaortic valve  gradient equals 7 mm Hg, which is probably normal in the  presence of a transcatheter aortic valve replacement. No  aortic valve regurgitation seen.  3. Endocardial visualization enhanced with intravenous  injection of Ultrasonic Enhancing Agent (Definity). Severe  global left ventricular systolic dysfunction.  4. The right ventricle is not well visualized; grossly  normal right ventricular systolic function.    < end of copied text >

## 2021-11-02 NOTE — PROGRESS NOTE ADULT - SUBJECTIVE AND OBJECTIVE BOX
[facility] - Milo Thomas, Adolfo, Binta, Brittanie, Javier Don  Office Number:  [phone]    Patient resting comfortably in bed in NAD.  Laying flat with no respiratory distress.  No complaints of chest pain, dyspnea, palpitations, PND, or orthopnea.  says he feels a little better  yesterday with icd shock for vt    ROS: negative unless otherwise mentioned.    Telemetry:  sr, nsvt    MEDICATIONS  (STANDING):  aMIOdarone    Tablet 400 milliGRAM(s) Oral every 12 hours  chlorhexidine 2% Cloths 1 Application(s) Topical <User Schedule>  dorzolamide 2%/timolol 0.5% Ophthalmic Solution 1 Drop(s) Both EYES two times a day  fentaNYL PCA (50 MICROgram(s)/mL) 30 milliLiter(s) PCA Continuous PCA Continuous  heparin   Injectable 5000 Unit(s) SubCutaneous every 8 hours  influenza   Vaccine 0.5 milliLiter(s) IntraMuscular once  insulin lispro (ADMELOG) corrective regimen sliding scale   SubCutaneous three times a day before meals  insulin lispro (ADMELOG) corrective regimen sliding scale   SubCutaneous at bedtime  ketorolac 0.5% Ophthalmic Solution 1 Drop(s) Both EYES daily  lactated ringers. 1000 milliLiter(s) (20 mL/Hr) IV Continuous <Continuous>  latanoprost 0.005% Ophthalmic Solution 1 Drop(s) Both EYES at bedtime  lidocaine   4% Patch 1 Patch Transdermal daily  melatonin 6 milliGRAM(s) Oral at bedtime  metoprolol tartrate 25 milliGRAM(s) Oral every 12 hours  pantoprazole    Tablet 40 milliGRAM(s) Oral before breakfast    MEDICATIONS  (PRN):  ALPRAZolam 0.25 milliGRAM(s) Oral three times a day PRN Anxiety  diphenhydrAMINE Injectable 25 milliGRAM(s) IV Push every 4 hours PRN Pruritus  fentaNYL  PCA (50 MICROgram(s)/mL) Rescue Clinician Bolus 10 MICROGram(s) IV Push every 15 minutes PRN for Pain Scale GREATER THAN 6  naloxone Injectable 0.1 milliGRAM(s) IV Push every 3 minutes PRN For ANY of the following changes in patient status:  A. RR LESS THAN 10 breaths per minute, B. Oxygen saturation LESS THAN 90%, C. Sedation score of 6  ondansetron Injectable 4 milliGRAM(s) IV Push every 6 hours PRN Nausea  oxyCODONE    IR 5 milliGRAM(s) Oral every 3 hours PRN Moderate Pain (4 - 6)  oxyCODONE    IR 10 milliGRAM(s) Oral every 4 hours PRN Severe Pain (7 - 10)      Allergies    Dilaudid (Anaphylaxis; Hives)    Intolerances        Vital Signs Last 24 Hrs  T(C): 36.6 (02 Nov 2021 07:00), Max: 36.6 (02 Nov 2021 07:00)  T(F): 97.8 (02 Nov 2021 07:00), Max: 97.8 (02 Nov 2021 07:00)  HR: 74 (02 Nov 2021 09:00) (72 - 88)  BP: 128/64 (02 Nov 2021 09:00) (111/66 - 151/87)  BP(mean): 88 (02 Nov 2021 09:00) (79 - 111)  RR: 21 (02 Nov 2021 09:00) (16 - 31)  SpO2: 99% (02 Nov 2021 09:00) (88% - 99%)    I&O's Summary    01 Nov 2021 07:01  -  02 Nov 2021 07:00  --------------------------------------------------------  IN: 480 mL / OUT: 1040 mL / NET: -560 mL    02 Nov 2021 07:01  -  02 Nov 2021 10:30  --------------------------------------------------------  IN: 60 mL / OUT: 0 mL / NET: 60 mL        ON EXAM:    Constitutional: NAD, awake   HEENT: Moist Mucous Membranes, Anicteric  Pulmonary: Decreased breath sounds b/l. No rales, crackles or wheeze appreciated.   Cardiovascular: Regular, S1 and S2, No murmurs, rubs, gallops or clicks  Gastrointestinal: Bowel Sounds present, soft, + tenderness.   Lymph: No peripheral edema. No lymphadenopathy.  Skin: No visible rashes or ulcers.  Psych:  Mood & affect appropriate for situation    LABS: All Labs Reviewed:                        8.2    5.89  )-----------( 195      ( 01 Nov 2021 21:35 )             25.8                         9.1    6.78  )-----------( 205      ( 01 Nov 2021 11:29 )             27.5                         8.5    5.48  )-----------( 188      ( 01 Nov 2021 07:18 )             26.8     01 Nov 2021 21:35    136    |  103    |  15     ----------------------------<  115    4.2     |  23     |  0.70   01 Nov 2021 11:29    135    |  100    |  16     ----------------------------<  144    4.1     |  22     |  0.73   01 Nov 2021 07:23    136    |  101    |  17     ----------------------------<  103    3.9     |  23     |  0.68     Ca    8.0        01 Nov 2021 21:35  Ca    8.3        01 Nov 2021 11:29  Ca    8.1        01 Nov 2021 07:23  Phos  3.4       01 Nov 2021 21:35  Phos  3.6       01 Nov 2021 11:29  Phos  3.3       01 Nov 2021 07:23  Mg     2.1       01 Nov 2021 21:35  Mg     1.6       01 Nov 2021 11:29  Mg     1.8       01 Nov 2021 07:23    TPro  6.0    /  Alb  2.8    /  TBili  0.7    /  DBili  x      /  AST  50     /  ALT  35     /  AlkPhos  91     01 Nov 2021 21:35  TPro  6.1    /  Alb  2.9    /  TBili  0.8    /  DBili  x      /  AST  47     /  ALT  38     /  AlkPhos  96     01 Nov 2021 11:29  TPro  5.7    /  Alb  2.9    /  TBili  0.7    /  DBili  x      /  AST  39     /  ALT  33     /  AlkPhos  86     01 Nov 2021 07:23    PT/INR - ( 01 Nov 2021 21:35 )   PT: 13.1 sec;   INR: 1.10 ratio         PTT - ( 01 Nov 2021 21:35 )  PTT:25.0 sec      Blood Culture:        78

## 2021-11-02 NOTE — PROGRESS NOTE ADULT - SUBJECTIVE AND OBJECTIVE BOX
SURGERY DAILY PROGRESS NOTE:     SUBJECTIVE/ROS: Patient seen at bedside.  No further shocks from AICD administered.  No events overnight.  Still with PCA for pain control. Denies nausea, vomiting, chest pain, shortness of breath     MEDICATIONS  (STANDING):  aMIOdarone    Tablet 400 milliGRAM(s) Oral every 12 hours  chlorhexidine 2% Cloths 1 Application(s) Topical <User Schedule>  dorzolamide 2%/timolol 0.5% Ophthalmic Solution 1 Drop(s) Both EYES two times a day  fentaNYL PCA (50 MICROgram(s)/mL) 30 milliLiter(s) PCA Continuous PCA Continuous  heparin   Injectable 5000 Unit(s) SubCutaneous every 8 hours  influenza   Vaccine 0.5 milliLiter(s) IntraMuscular once  insulin lispro (ADMELOG) corrective regimen sliding scale   SubCutaneous three times a day before meals  insulin lispro (ADMELOG) corrective regimen sliding scale   SubCutaneous at bedtime  ketorolac 0.5% Ophthalmic Solution 1 Drop(s) Both EYES daily  lactated ringers. 1000 milliLiter(s) (20 mL/Hr) IV Continuous <Continuous>  latanoprost 0.005% Ophthalmic Solution 1 Drop(s) Both EYES at bedtime  lidocaine   4% Patch 1 Patch Transdermal daily  melatonin 6 milliGRAM(s) Oral at bedtime  metoprolol tartrate 25 milliGRAM(s) Oral every 12 hours  pantoprazole    Tablet 40 milliGRAM(s) Oral before breakfast    MEDICATIONS  (PRN):  ALPRAZolam 0.25 milliGRAM(s) Oral three times a day PRN Anxiety  diphenhydrAMINE Injectable 25 milliGRAM(s) IV Push every 4 hours PRN Pruritus  fentaNYL  PCA (50 MICROgram(s)/mL) Rescue Clinician Bolus 10 MICROGram(s) IV Push every 15 minutes PRN for Pain Scale GREATER THAN 6  naloxone Injectable 0.1 milliGRAM(s) IV Push every 3 minutes PRN For ANY of the following changes in patient status:  A. RR LESS THAN 10 breaths per minute, B. Oxygen saturation LESS THAN 90%, C. Sedation score of 6  ondansetron Injectable 4 milliGRAM(s) IV Push every 6 hours PRN Nausea      OBJECTIVE:    Vital Signs Last 24 Hrs  T(C): 36.6 (02 Nov 2021 07:00), Max: 36.8 (01 Nov 2021 09:25)  T(F): 97.8 (02 Nov 2021 07:00), Max: 98.3 (01 Nov 2021 09:25)  HR: 81 (02 Nov 2021 07:00) (72 - 88)  BP: 143/65 (02 Nov 2021 07:00) (111/66 - 151/87)  BP(mean): 93 (02 Nov 2021 07:00) (79 - 111)  RR: 24 (02 Nov 2021 07:00) (16 - 31)  SpO2: 95% (02 Nov 2021 07:00) (88% - 99%)        I&O's Detail    01 Nov 2021 07:01  -  02 Nov 2021 07:00  --------------------------------------------------------  IN:    Lactated Ringers: 260 mL    Oral Fluid: 220 mL  Total IN: 480 mL    OUT:    Bulb (mL): 190 mL    Voided (mL): 850 mL  Total OUT: 1040 mL    Total NET: -560 mL          Daily     Daily     LABS:                        8.2    5.89  )-----------( 195      ( 01 Nov 2021 21:35 )             25.8     11-01    136  |  103  |  15  ----------------------------<  115<H>  4.2   |  23  |  0.70    Ca    8.0<L>      01 Nov 2021 21:35  Phos  3.4     11-01  Mg     2.1     11-01    TPro  6.0  /  Alb  2.8<L>  /  TBili  0.7  /  DBili  x   /  AST  50<H>  /  ALT  35  /  AlkPhos  91  11-01    PT/INR - ( 01 Nov 2021 21:35 )   PT: 13.1 sec;   INR: 1.10 ratio         PTT - ( 01 Nov 2021 21:35 )  PTT:25.0 sec      PHYSICAL EXAM:  Constitutional: well developed, well nourished, NAD  Respiratory: no respiratory distress, no subcostal retractions  Cardiovascular: S1, S2  Gastrointestinal: abdomen soft, nontender, nondistended,  Midline incision open, w packing, superior aspect of incision with fibrinous tissue  Extremities: FROM, warm

## 2021-11-02 NOTE — PROGRESS NOTE ADULT - SUBJECTIVE AND OBJECTIVE BOX
Pain Management Attending Addendum    SUBJECTIVE:    Therapy:	  PCA	 x  Epidural           s/p Spinal Opoid              Postpartum infusion	  Patient controlled regional anesthesia (PCRA)    prn Analgesics    OBJECTIVE: No new signsx      Other:    Side Effects:    None	x		 Other:    Assessment of Catheter Site:		 Intact		 Other:    ASSESSMENT/PLAN  Continue current therapyx    Therapy changed to:     IV PCA        Epidural      prn Analgesics     post partum infusion    Comments:

## 2021-11-02 NOTE — PROGRESS NOTE ADULT - ASSESSMENT
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 Patient now s/p Whipple 10/27, recovering in SICU, now transferred to the floor.     Plan  - Anesthesia following for pain  - Pain service consulted by SICU  - FLD, tolerating  - continue with Dressing changes BID, surgery will do AM dressing   - Lasix 20mg   - Dvt PPX; SQH  - EP recommendations appreciated  - Cardiology recommendations appreciate  -  appreciate excellent SICU care    [phone]  Red Surgery  80M w/ PMHx pancreatic cancer (dx 3/2021, s/p neoadjuvant therapy), HTN, HLD, CHF (EF: 35%), CAD s/p stents x2 (~15 years ago), AICD (implanted 2005, extracted and re-implantation 9/2021), TAVR (4/2021), bilateral PE (7/2021) on Xarelto, spinal stenosis, macular degeneration, GERD, BPH, Left THR (x2 revisions), left femur fracture (hardware), 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 Patient now s/p Whipple 10/27, recovered in SICU, now transferred to the floor. NSVT w/ appropriate shock from AICD on 11/1, transferred to SICU for further management.    Plan  - Anesthesia following for pain  - Pain service consulted by SICU  - FLD, tolerating  - continue with Dressing changes BID, surgery will do AM dressing   - Lasix 20mg   - Dvt PPX; SQH  - EP recommendations appreciated  - Cardiology recommendations appreciate  -  appreciate excellent SICU care    [phone]  Red Surgery

## 2021-11-03 LAB
ANION GAP SERPL CALC-SCNC: 14 MMOL/L — SIGNIFICANT CHANGE UP (ref 5–17)
BASE EXCESS BLDA CALC-SCNC: 2.8 MMOL/L — SIGNIFICANT CHANGE UP (ref -2–3)
BUN SERPL-MCNC: 11 MG/DL — SIGNIFICANT CHANGE UP (ref 7–23)
CALCIUM SERPL-MCNC: 8.4 MG/DL — SIGNIFICANT CHANGE UP (ref 8.4–10.5)
CHLORIDE SERPL-SCNC: 95 MMOL/L — LOW (ref 96–108)
CK MB BLD-MCNC: <7.1 % — HIGH (ref 0–3.5)
CK MB CFR SERPL CALC: <1 NG/ML — SIGNIFICANT CHANGE UP (ref 0–6.7)
CK SERPL-CCNC: 14 U/L — LOW (ref 30–200)
CO2 BLDA-SCNC: 27 MMOL/L — HIGH (ref 19–24)
CO2 SERPL-SCNC: 23 MMOL/L — SIGNIFICANT CHANGE UP (ref 22–31)
CREAT SERPL-MCNC: 0.86 MG/DL — SIGNIFICANT CHANGE UP (ref 0.5–1.3)
GAS PNL BLDA: SIGNIFICANT CHANGE UP
GLUCOSE BLDC GLUCOMTR-MCNC: 111 MG/DL — HIGH (ref 70–99)
GLUCOSE BLDC GLUCOMTR-MCNC: 124 MG/DL — HIGH (ref 70–99)
GLUCOSE BLDC GLUCOMTR-MCNC: 83 MG/DL — SIGNIFICANT CHANGE UP (ref 70–99)
GLUCOSE SERPL-MCNC: 120 MG/DL — HIGH (ref 70–99)
HCO3 BLDA-SCNC: 26 MMOL/L — SIGNIFICANT CHANGE UP (ref 21–28)
HCT VFR BLD CALC: 28.8 % — LOW (ref 39–50)
HGB BLD-MCNC: 9.1 G/DL — LOW (ref 13–17)
MCHC RBC-ENTMCNC: 31.2 PG — SIGNIFICANT CHANGE UP (ref 27–34)
MCHC RBC-ENTMCNC: 31.6 GM/DL — LOW (ref 32–36)
MCV RBC AUTO: 98.6 FL — SIGNIFICANT CHANGE UP (ref 80–100)
NRBC # BLD: 0 /100 WBCS — SIGNIFICANT CHANGE UP (ref 0–0)
PCO2 BLDA: 35 MMHG — SIGNIFICANT CHANGE UP (ref 35–48)
PH BLDA: 7.48 — HIGH (ref 7.35–7.45)
PLATELET # BLD AUTO: 193 K/UL — SIGNIFICANT CHANGE UP (ref 150–400)
PO2 BLDA: 75 MMHG — LOW (ref 83–108)
POTASSIUM SERPL-MCNC: 4.4 MMOL/L — SIGNIFICANT CHANGE UP (ref 3.5–5.3)
POTASSIUM SERPL-SCNC: 4.4 MMOL/L — SIGNIFICANT CHANGE UP (ref 3.5–5.3)
RBC # BLD: 2.92 M/UL — LOW (ref 4.2–5.8)
RBC # FLD: 14.6 % — HIGH (ref 10.3–14.5)
SAO2 % BLDA: 95.1 % — SIGNIFICANT CHANGE UP (ref 94–98)
SODIUM SERPL-SCNC: 132 MMOL/L — LOW (ref 135–145)
SURGICAL PATHOLOGY STUDY: SIGNIFICANT CHANGE UP
TROPONIN T, HIGH SENSITIVITY RESULT: 86 NG/L — HIGH (ref 0–51)
WBC # BLD: 9.26 K/UL — SIGNIFICANT CHANGE UP (ref 3.8–10.5)
WBC # FLD AUTO: 9.26 K/UL — SIGNIFICANT CHANGE UP (ref 3.8–10.5)

## 2021-11-03 PROCEDURE — 99231 SBSQ HOSP IP/OBS SF/LOW 25: CPT

## 2021-11-03 PROCEDURE — 99232 SBSQ HOSP IP/OBS MODERATE 35: CPT

## 2021-11-03 PROCEDURE — 71045 X-RAY EXAM CHEST 1 VIEW: CPT | Mod: 26

## 2021-11-03 PROCEDURE — 99233 SBSQ HOSP IP/OBS HIGH 50: CPT

## 2021-11-03 PROCEDURE — 93010 ELECTROCARDIOGRAM REPORT: CPT

## 2021-11-03 PROCEDURE — 99291 CRITICAL CARE FIRST HOUR: CPT

## 2021-11-03 RX ORDER — ACETAMINOPHEN 500 MG
1000 TABLET ORAL ONCE
Refills: 0 | Status: COMPLETED | OUTPATIENT
Start: 2021-11-03 | End: 2021-11-03

## 2021-11-03 RX ORDER — HYDROMORPHONE HYDROCHLORIDE 2 MG/ML
0.5 INJECTION INTRAMUSCULAR; INTRAVENOUS; SUBCUTANEOUS ONCE
Refills: 0 | Status: DISCONTINUED | OUTPATIENT
Start: 2021-11-03 | End: 2021-11-03

## 2021-11-03 RX ORDER — VANCOMYCIN HCL 1 G
1250 VIAL (EA) INTRAVENOUS EVERY 12 HOURS
Refills: 0 | Status: DISCONTINUED | OUTPATIENT
Start: 2021-11-03 | End: 2021-11-05

## 2021-11-03 RX ORDER — ONDANSETRON 8 MG/1
4 TABLET, FILM COATED ORAL ONCE
Refills: 0 | Status: COMPLETED | OUTPATIENT
Start: 2021-11-03 | End: 2021-11-03

## 2021-11-03 RX ORDER — PIPERACILLIN AND TAZOBACTAM 4; .5 G/20ML; G/20ML
3.38 INJECTION, POWDER, LYOPHILIZED, FOR SOLUTION INTRAVENOUS ONCE
Refills: 0 | Status: COMPLETED | OUTPATIENT
Start: 2021-11-03 | End: 2021-11-03

## 2021-11-03 RX ORDER — VANCOMYCIN HCL 1 G
1500 VIAL (EA) INTRAVENOUS ONCE
Refills: 0 | Status: DISCONTINUED | OUTPATIENT
Start: 2021-11-03 | End: 2021-11-03

## 2021-11-03 RX ORDER — PIPERACILLIN AND TAZOBACTAM 4; .5 G/20ML; G/20ML
3.38 INJECTION, POWDER, LYOPHILIZED, FOR SOLUTION INTRAVENOUS EVERY 8 HOURS
Refills: 0 | Status: DISCONTINUED | OUTPATIENT
Start: 2021-11-04 | End: 2021-11-10

## 2021-11-03 RX ADMIN — OXYCODONE HYDROCHLORIDE 10 MILLIGRAM(S): 5 TABLET ORAL at 15:59

## 2021-11-03 RX ADMIN — HEPARIN SODIUM 5000 UNIT(S): 5000 INJECTION INTRAVENOUS; SUBCUTANEOUS at 19:54

## 2021-11-03 RX ADMIN — Medication 50 GRAM(S): at 05:27

## 2021-11-03 RX ADMIN — Medication 1 DROP(S): at 18:18

## 2021-11-03 RX ADMIN — CHLORHEXIDINE GLUCONATE 1 APPLICATION(S): 213 SOLUTION TOPICAL at 05:29

## 2021-11-03 RX ADMIN — Medication 400 MILLIGRAM(S): at 21:32

## 2021-11-03 RX ADMIN — PANTOPRAZOLE SODIUM 40 MILLIGRAM(S): 20 TABLET, DELAYED RELEASE ORAL at 06:56

## 2021-11-03 RX ADMIN — Medication 166.67 MILLIGRAM(S): at 23:48

## 2021-11-03 RX ADMIN — OXYCODONE HYDROCHLORIDE 5 MILLIGRAM(S): 5 TABLET ORAL at 11:00

## 2021-11-03 RX ADMIN — OXYCODONE HYDROCHLORIDE 10 MILLIGRAM(S): 5 TABLET ORAL at 15:29

## 2021-11-03 RX ADMIN — Medication 1000 MILLIGRAM(S): at 12:33

## 2021-11-03 RX ADMIN — Medication 40 MILLIGRAM(S): at 05:29

## 2021-11-03 RX ADMIN — AMIODARONE HYDROCHLORIDE 400 MILLIGRAM(S): 400 TABLET ORAL at 05:28

## 2021-11-03 RX ADMIN — HEPARIN SODIUM 5000 UNIT(S): 5000 INJECTION INTRAVENOUS; SUBCUTANEOUS at 05:27

## 2021-11-03 RX ADMIN — Medication 25 MILLIGRAM(S): at 19:55

## 2021-11-03 RX ADMIN — LIDOCAINE 1 PATCH: 4 CREAM TOPICAL at 19:50

## 2021-11-03 RX ADMIN — Medication 25 MILLIGRAM(S): at 05:29

## 2021-11-03 RX ADMIN — OXYCODONE HYDROCHLORIDE 10 MILLIGRAM(S): 5 TABLET ORAL at 20:15

## 2021-11-03 RX ADMIN — OXYCODONE HYDROCHLORIDE 10 MILLIGRAM(S): 5 TABLET ORAL at 05:28

## 2021-11-03 RX ADMIN — DORZOLAMIDE HYDROCHLORIDE TIMOLOL MALEATE 1 DROP(S): 20; 5 SOLUTION/ DROPS OPHTHALMIC at 18:16

## 2021-11-03 RX ADMIN — Medication 6 MILLIGRAM(S): at 19:54

## 2021-11-03 RX ADMIN — OXYCODONE HYDROCHLORIDE 10 MILLIGRAM(S): 5 TABLET ORAL at 05:58

## 2021-11-03 RX ADMIN — ONDANSETRON 4 MILLIGRAM(S): 8 TABLET, FILM COATED ORAL at 20:01

## 2021-11-03 RX ADMIN — LATANOPROST 1 DROP(S): 0.05 SOLUTION/ DROPS OPHTHALMIC; TOPICAL at 19:51

## 2021-11-03 RX ADMIN — LIDOCAINE 1 PATCH: 4 CREAM TOPICAL at 09:45

## 2021-11-03 RX ADMIN — PIPERACILLIN AND TAZOBACTAM 200 GRAM(S): 4; .5 INJECTION, POWDER, LYOPHILIZED, FOR SOLUTION INTRAVENOUS at 21:33

## 2021-11-03 RX ADMIN — OXYCODONE HYDROCHLORIDE 10 MILLIGRAM(S): 5 TABLET ORAL at 19:55

## 2021-11-03 RX ADMIN — DORZOLAMIDE HYDROCHLORIDE TIMOLOL MALEATE 1 DROP(S): 20; 5 SOLUTION/ DROPS OPHTHALMIC at 06:57

## 2021-11-03 RX ADMIN — Medication 400 MILLIGRAM(S): at 12:18

## 2021-11-03 RX ADMIN — Medication 1000 MILLIGRAM(S): at 21:59

## 2021-11-03 RX ADMIN — OXYCODONE HYDROCHLORIDE 5 MILLIGRAM(S): 5 TABLET ORAL at 11:30

## 2021-11-03 RX ADMIN — LIDOCAINE 1 PATCH: 4 CREAM TOPICAL at 07:27

## 2021-11-03 RX ADMIN — HEPARIN SODIUM 5000 UNIT(S): 5000 INJECTION INTRAVENOUS; SUBCUTANEOUS at 15:29

## 2021-11-03 NOTE — DIETITIAN INITIAL EVALUATION ADULT. - PERTINENT MEDS FT
MEDICATIONS  (STANDING):  aMIOdarone    Tablet 400 milliGRAM(s) Oral every 12 hours  chlorhexidine 2% Cloths 1 Application(s) Topical <User Schedule>  dorzolamide 2%/timolol 0.5% Ophthalmic Solution 1 Drop(s) Both EYES two times a day  furosemide    Tablet 40 milliGRAM(s) Oral daily  heparin   Injectable 5000 Unit(s) SubCutaneous every 8 hours  influenza   Vaccine 0.5 milliLiter(s) IntraMuscular once  insulin lispro (ADMELOG) corrective regimen sliding scale   SubCutaneous three times a day before meals  insulin lispro (ADMELOG) corrective regimen sliding scale   SubCutaneous at bedtime  ketorolac 0.5% Ophthalmic Solution 1 Drop(s) Both EYES daily  latanoprost 0.005% Ophthalmic Solution 1 Drop(s) Both EYES at bedtime  lidocaine   4% Patch 1 Patch Transdermal daily  melatonin 6 milliGRAM(s) Oral at bedtime  metoprolol tartrate 25 milliGRAM(s) Oral every 12 hours  pantoprazole    Tablet 40 milliGRAM(s) Oral before breakfast
Discharged

## 2021-11-03 NOTE — PROGRESS NOTE ADULT - ASSESSMENT
81 y/o male w/ a PMHx of CAD s/p stents x2, HFrEF of ~35% s/p AICD, s/p TAVR, HTN, HLD, bilateral PEs, MSSA bacteremia secondary to right foot osteomyelitis s/p debridement, GERD, BPH, anxiety, insomnia, spinal stenosis, macular degeneration, left KARIE w/ revision x2, and pancreatic CA s/p neoadjuvant chemotherapy presenting s/p Whipple w/ a post-op course c/b hypovolemic shock, episodes of NSVT, and an allergic reaction questionable Dilaudid, re-admit to SICU for s/p shock for arrythmia.    Neuro: acute post-op pain, anxiety, insomnia  - Multimodal pain control with acetaminophen, oxycodone, and lidocaine patch  - Home melatonin for insomnia  - Home Xanax for anxiety    Resp: no acute issues  - Out of bed to chair, ambulate as tolerated, and incentive spirometry to prevent atelectasis    CV: s/p shock, for arrythmia  Hx of CAD, HFrEF, s/p TAVR, HTN, hypovolemic shock (resolved), episodes of NSVT  - Monitor vital signs  - Amiodarone 400mg q12 for 7d, 200mg q12 for 7d, then 200mg qd   - Metoprolol 25mg BID  - Sotalol discontinued  - Keep K>4, Mg >2  - EP/Cardiology following    GI: s/p Whipple 10/26,  GERD  - Full liquid diet; will advance diet as per primary team/[place] pathway  - Protonix daily  - Will continue to monitor drain output and daily drain amylase levels  - Zofran PRN nausea/vomiting    Renal: BPH  - Monitor I&Os and electrolytes w/ repletions as necessary  - IVL    Heme: hc PE on xarelto  - SQH for VTE prophylaxis  - F/u primary team regarding restarting home Xarelto    ID: no acute issues  - Monitor for clinical evidence of active infection    Endo: HLD , HgbA1C 5.3% on 9/22  - Monitor glucose w/ ISS before meals & at bedtime for glycemic control    Misc: concern for anaphylaxis (2/2 to dilaudid?)  - Will f/u outpatient with allergy/immunology    Code Status: Full Code  Disposition: Will Remain in SICU 81 y/o male w/ a PMHx of CAD s/p stents x2, HFrEF of ~35% s/p AICD, s/p TAVR, HTN, HLD, bilateral PEs, MSSA bacteremia secondary to right foot osteomyelitis s/p debridement, GERD, BPH, anxiety, insomnia, spinal stenosis, macular degeneration, left KARIE w/ revision x2, and pancreatic CA s/p neoadjuvant chemotherapy presenting s/p Whipple w/ a post-op course c/b hypovolemic shock, episodes of NSVT, and an allergic reaction questionable Dilaudid, re-admit to SICU for s/p shock for arrythmia.    Neuro: acute post-op pain, anxiety, insomnia  - Multimodal pain control with acetaminophen, oxycodone, and lidocaine patch  - Home melatonin for insomnia  - Home Xanax for anxiety    Resp: no acute issues  - Out of bed to chair, ambulate as tolerated, and incentive spirometry to prevent atelectasis    CV: s/p shock, for arrythmia  Hx of CAD, HFrEF, s/p TAVR, HTN, hypovolemic shock (resolved), episodes of NSVT  - Monitor vital signs  - Amiodarone 400mg q12 for 7d, 200mg q12 for 7d, then 200mg qd   - Metoprolol 25mg BID  - Lasix 40mg PO daily  - Sotalol discontinued  - Keep K>4, Mg >2  - EP/Cardiology following    GI: s/p Whipple 10/26,  GERD  - Full liquid diet; will advance diet as per primary team/[place] pathway  - Protonix daily  - Will continue to monitor drain output and daily drain amylase levels  - Zofran PRN nausea/vomiting    Renal: BPH  - Monitor I&Os and electrolytes w/ repletions as necessary  - IVL    Heme: hc PE on xarelto  - SQH for VTE prophylaxis  - F/u primary team regarding restarting home Xarelto    ID: no acute issues  - Monitor for clinical evidence of active infection    Endo: HLD , HgbA1C 5.3% on 9/22  - Monitor glucose w/ ISS before meals & at bedtime for glycemic control    Misc: concern for anaphylaxis (2/2 to dilaudid?)  - Will f/u outpatient with allergy/immunology    Code Status: Full Code  Disposition: Will Remain in SICU

## 2021-11-03 NOTE — PROGRESS NOTE ADULT - ASSESSMENT
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 He required icd extraction, and a subq icd was replaced. He is now s/p Whipple procedure    - now off of pressor support with improved BP  - 11/1with sustained vt and subsequent icd shock, possibly in the setting of volume overload  - sotalol stopped, and now on amio load  - trend lfts  - cont metoprolol, with goal to titrate up as tolerated  - brief nsvt noted on telemetry, cont to monitor    - known history of systolic hf (mod lv dysfunction and mod ms)  - echo 10/27 with moderate ms at HR 80, tavr in place. severe LV dysfunction  - appears more compensated from a hf perspective  - Lasix PO started  - Please continue to maintain strict I/Os, monitor daily weights, Cr, and K.   - entresto remains on hold, and will eventually restart it    - history of PE in 7/2021, and had been on xarelto.  is at elevated risk of vte noting pancreatic malignancy and relatively recent pe  - resume ac when safe from a surgical perspective. is presently on dvt ppx sq heparin    - Other cardiovascular workup will depend on clinical course.  - will follow with you

## 2021-11-03 NOTE — DIETITIAN INITIAL EVALUATION ADULT. - FACTORS AFF FOOD INTAKE
Pt ordered for NPO or Clear Liquid diet 10/26-10/30 (5 days inadequate). Advanced to Full Liquids as of 10/31./NPO Pt ordered for NPO or Clear Liquid diet 10/26-10/30 (5 days inadequate). Advanced to Full Liquids as of 10/30. Lunch tray observed: pt consumed yogurt and juice only./NPO

## 2021-11-03 NOTE — DIETITIAN INITIAL EVALUATION ADULT. - REASON INDICATOR FOR ASSESSMENT
Nutrition Assessment warranted for length of stay on SICU  Information obtained from: medical record, communication with team. Nutrition Assessment warranted for length of stay on SICU  Information obtained from: medical record, 8ICU Interdisciplinary Rounds. Pt asleep at time of visit.

## 2021-11-03 NOTE — ASSESSMENT
[FreeTextEntry1] : 10/12/21  Surg/Onc Dr Mercer\par A/P\par \par \par Mr. JUANIS DE SANTIAGO is a 80 year old male who presents for evaluation in our Pancreas Multidisciplinary Clinic. His PMH is significant for pancreatic cancer (dx 3/2021), HTN, HLD, CHF, CAD s/p stents x2 (~15 years ago), defibrillator, TAVR (4/2021), bilateral PE (7/2021) on Xarelto, spinal stenosis, GERD, BPH, macular degeneration. \par He recently presented to [facility] and was transferred to [facility] on 9/26/21 w/ right hallux acute OM/ MSSA bacteremia. He is s/p R hallux amputation on 9/20, c/b ruptured hematoma. s/p CORBIN without evidence of vegetations, ICD lead extraction prophylactically, and mediport removal. He then underwent subcutaneous ICD placement on 10/4. He was d/c'd to subacute rehab on 10/5. He was d/c on course of antibiotics (cefazolin 2 g q8h until 10/14 to then keflex x 14 more days). \par \par Pancreatic cancer history-- initially p/w abdominal pain and jaundice in March to [facility]. 3/2/21 CT with mild CBD dilatation at the head of pancreas. 3/5/21 EUS noting pancreas head mass abutting the portal vein and ERCP with stent placement. He went to [facility] for second opinion and CT was repeated 3/12/21. He was staged as resectable, was planned for upfront surgery (Whipple) but was not cleared from cardiac standpoint. He then had TAVR procedure at [facility] on 3/25/21 where the patient decided to also assume oncological care. On 4/15/21 he underwent staging laparoscopy by Dr Murphy and was staged as borderline resectable (noted tumor touching the portal vein). He started neoadjuvant chemotherapy with Dr. Carrasco- Gemzar / Abraxane and completed C5D8 of 9/7/21. He received 2 infusions total of Zarxio for neutropenia. He is on Xarelto for history of PE that was found a few months ago on chest CT at [facility]. No PE noted on CT chest in 8/13/21. \par \par Discussed that his tumor is technically resectable. He did start chemotherapy, however, did not a full course of neoadjuvant chemo given his medical comorbidities and OM episode requiring amputation. Discussed that typically we try to optimize neoadjuvant chemo and radiation if necessary prior to surgical resection. He presents today for a third opinion and has been off therapy since 9/7/21. Discussed that if we were to move forward with radiation there were be a longer delay to surgery while not getting full systemic chemotherapy. At this time I recommend moving forward with surgery.\par \par Due to the location and size I recommend a pancreaticoduodenectomy (Whipple) procedure at this time. Jose a diagram for the patient and family to better understand the operation. The procedure and associated risks, benefits and possible complications were discussed and all questions were answered. The procedure involves removal of the gallbladder, head of the pancreas, bile duct, duodenum and sometimes a small portion of the stomach and will last 4-6 hours with a hospital stay of about one week. We discussed that the most common complaint after this procedure is fatigue followed by poor appetite. It will take approximately 8-12 weeks to feel "back to normal" after the operation. Risks, benefits and details of the operation were discussed. These include bleeding, infection, damage to surrounding structures, chyle leakage, delayed gastric emptying, cardiopulmonary complications and the risks of anesthesia. Mortality quoted at <1.5%. Discussed that adjuvant treatment including additional chemotherapy/ radiation therapy will be determined by final pathology. The patient expressed understanding and the desire to proceed. Our office will contact the patient to schedule. He will need cardiac clearance and recommendations for anticoagulation prior to surgery. \par \par Today, I personally spent 60 minutes in total time including reviewing imaging and studies, discussing complex treatment regimens, direct face to face time with the patient, patient education and counseling.\par  \par  \par \par \par 10/12/21  Med/Onc Dr Juan\par A/P\par \par \par 81 y/o man with extensive cardiac history (TAVR 4/2021, bilateral PE 7/2021 on rivaroxaban), s/p ~5 months of gemcitabine+nab-paclitaxel neoadjuvant therapy now with surgically resectable disease, presenting to medical oncology to establish care with [facility] oncology.\par \par He and his family asked many good questions about risk factors a/w pancreatic cancer, the implications of his diagnosis on his well being and prognosis. While concerned that the surgery is not a guarantee of cure, he acknowledged that the operation is our only approach for curative intent and that his response to chemotherapy suggests some degree of chemotherapy sensitivity in his tumor, which is a good prognostic factor. He asked if I would provide adjuvant chemotherapy surgery and I said I would consider it. He has already received, and tolerated, quite a bit of therapy, on the order of 5 months, which approximates our typical delivery of ~6 months of adjuvant chemotherapy. That said, depending on his recovery after the operation, and on the response of his tumor on surgical pathology, and his goals, I would consider continue some form of chemotherapy in the adjuvant setting.\par \par Looking forward to seeing him and his family again in my clinic a few weeks following surgery. \par \par David Juan MD PhD\par Medical Oncology Attending\par

## 2021-11-03 NOTE — HISTORY OF PRESENT ILLNESS
[Jaundice] : jaundice [ERCP] : ERCP [EUS] : EUS [Biopsy] : biopsy performed [Before Surgery] : before surgery [Borderline] : borderline [Endostent] : endostent [Abdominal Pain] : abdominal pain [Ambulatory and capable of all self care but unable to carry out any work activities] : Status 2 - Ambulatory and capable of all self care but unable to carry out any work activities. Up and about more than 50% of waking hours [Pancreatic ductal adenocarcinoma] : Pancreatic ductal adenocarcinoma [Resectable] : resectable [] : yes [PV-SMV] : PV-SMV [Less than 180 (abutment)] : less than 180 (abutment) [head] : head [Disease demonstrating good response] : disease demonstrating good response [Tolerated well] : tolerated well [Surgical resection] : surgical resection [Curative (aimed at resection)] : curative (aimed at resection) [Nutrition] : Nutrition [de-identified] : This is a non-billable note*\par \par \par Mr. JUANIS DE SANTIAGO is a 80 year old male who presents for evaluation in our Pancreas Multidisciplinary Clinic.\par His PMH is significant for pancreatic cancer (dx 3/2021), HTN, HLD, CHF, CAD s/p stents x2 (~15 years ago), defibrillator, TAVR (4/2021), bilateral PE (7/2021) on Xarelto, spinal stenosis, GERD, BPH, macular degeneration. \par He recently presented to [facility] and was transferred to [facility] on 9/26/21 w/ right hallux acute OM/  MSSA bacteremia. He is s/p R hallux amputation on 9/20, c/b ruptured hematoma.  s/p CORBIN without evidence of vegetations, ICD lead extraction prophylactically, and mediport removal. He then underwent subcutaneous ICD placement on 10/4.  He was d/c'd to subacute rehab on 10/5. He was d/c on course of antibiotics (cefazolin 2 g q8h until 10/14 to then keflex x 14 more days).  \par \par Pancreatic cancer history-- initially p/w abdominal pain and jaundice in March to [facility]. 3/2/21 CT with mild CBD dilatation at the head of pancreas. 3/5/21 EUS noting pancreas head mass abutting the portal vein and ERCP with stent placement.  He went to [facility] for second opinion and CT was repeated 3/12/21. He was staged as resectable, was planned for upfront surgery (Whipple) but was not cleared from cardiac standpoint. He then had TAVR procedure at [facility] on 3/25/21 where the patient decided to also assume oncological care. On 4/15/21 he underwent staging laparoscopy by Dr Murphy and was staged as borderline resectable (noted tumor touching the portal vein).  He started neoadjuvant chemotherapy with Dr. Carrasco- Gemzar / Abraxane and completed C5D8 of 9/7/21. He received 2 infusions total of Zarxio for neutropenia.  He is on Xarelto for history of PE that was found a few months ago on chest CT at [facility]. No PE noted on CT chest in 8/13/21.   \par \par Genetic testing:  Pt states no genetic mutations found \par \par Patient is staying in rehab and reports his allowed 25% weight bearing on his right lower extremity. He is able to walk with a walker/ assist.  Prior to foot surgery he was able to walk on his own. \par Family cancer hx:  Father- prostate \par  [TextBox_4] : 3/2021 [TextBox_6] : pancreas mass [TextBox_23] : 100 [TextBox_39] : outside path [TextBox_41] : adenocarcinoma  [TextBox_43] : 3/5/21  [TextBox_5] : 10/12/21 [TextBox_38] : 53 [FreeTextEntry3] : 4.0 [FreeTextEntry4] : 0.4 [TextBox_40] : 3/2/21

## 2021-11-03 NOTE — PROGRESS NOTE ADULT - ASSESSMENT
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 Patient now s/p Whipple 10/27, recovered in SICU, now transferred to the floor. NSVT w/ appropriate shock from AICD on 11/1, transferred to SICU for further management.    Plan  - Anesthesia following for pain  - Pain service consulted by SICU  - FLD   - PT wound care consult placed for wound vac therapy   - Lasix 20mg   - DVT PPX; SQH  - EP recommendations appreciated  - Cardiology recommendations appreciate  -  appreciate excellent SICU care    [phone]  Red Surgery

## 2021-11-03 NOTE — PROGRESS NOTE ADULT - SUBJECTIVE AND OBJECTIVE BOX
24H hour events: NSR with 14 bts VT ~0400     MEDICATIONS:  aMIOdarone    Tablet 400 milliGRAM(s) Oral every 12 hours  furosemide    Tablet 40 milliGRAM(s) Oral daily  heparin   Injectable 5000 Unit(s) SubCutaneous every 8 hours  metoprolol tartrate 25 milliGRAM(s) Oral every 12 hours      diphenhydrAMINE Injectable 25 milliGRAM(s) IV Push every 4 hours PRN    acetaminophen     Tablet .. 650 milliGRAM(s) Oral every 6 hours PRN  ALPRAZolam 0.25 milliGRAM(s) Oral three times a day PRN  melatonin 6 milliGRAM(s) Oral at bedtime  oxyCODONE    IR 5 milliGRAM(s) Oral every 3 hours PRN  oxyCODONE    IR 10 milliGRAM(s) Oral every 4 hours PRN    pantoprazole    Tablet 40 milliGRAM(s) Oral before breakfast    insulin lispro (ADMELOG) corrective regimen sliding scale   SubCutaneous three times a day before meals  insulin lispro (ADMELOG) corrective regimen sliding scale   SubCutaneous at bedtime    chlorhexidine 2% Cloths 1 Application(s) Topical <User Schedule>  dorzolamide 2%/timolol 0.5% Ophthalmic Solution 1 Drop(s) Both EYES two times a day  influenza   Vaccine 0.5 milliLiter(s) IntraMuscular once  ketorolac 0.5% Ophthalmic Solution 1 Drop(s) Both EYES daily  latanoprost 0.005% Ophthalmic Solution 1 Drop(s) Both EYES at bedtime  lidocaine   4% Patch 1 Patch Transdermal daily      REVIEW OF SYSTEMS:  See HPI, otherwise ROS negative.    PHYSICAL EXAM:  T(C): 36.8 (11-03-21 @ 07:00), Max: 37 (11-02-21 @ 23:00)  HR: 79 (11-03-21 @ 10:00) (72 - 92)  BP: 112/65 (11-03-21 @ 10:00) (97/54 - 164/86)  RR: 22 (11-03-21 @ 10:00) (14 - 28)  SpO2: 96% (11-03-21 @ 10:00) (93% - 100%)  Wt(kg): --  I&O's Summary    02 Nov 2021 07:01  -  03 Nov 2021 07:00  --------------------------------------------------------  IN: 450 mL / OUT: 1860 mL / NET: -1410 mL    03 Nov 2021 07:01  -  03 Nov 2021 10:16  --------------------------------------------------------  IN: 0 mL / OUT: 950 mL / NET: -950 mL        Appearance: Alert. NAD	  Cardiovascular: +S1S2 RRR no m/g/r  Respiratory: CTA B/L	  Gastrointestinal:  Soft, NT. ND. +BS	  Extremities: No edema BLE  Vascular: Peripheral pulses palpable 2+ bilaterally      LABS:	 	    CBC Full  -  ( 02 Nov 2021 21:48 )  WBC Count : 7.25 K/uL  Hemoglobin : 8.3 g/dL  Hematocrit : 26.5 %  Platelet Count - Automated : 205 K/uL  Mean Cell Volume : 99.6 fl  Mean Cell Hemoglobin : 31.2 pg  Mean Cell Hemoglobin Concentration : 31.3 gm/dL  Auto Neutrophil # : x  Auto Lymphocyte # : x  Auto Monocyte # : x  Auto Eosinophil # : x  Auto Basophil # : x  Auto Neutrophil % : x  Auto Lymphocyte % : x  Auto Monocyte % : x  Auto Eosinophil % : x  Auto Basophil % : x    11-02    133<L>  |  99  |  11  ----------------------------<  131<H>  4.2   |  24  |  0.72  11-01    136  |  103  |  15  ----------------------------<  115<H>  4.2   |  23  |  0.70    Ca    8.2<L>      02 Nov 2021 21:48  Ca    8.0<L>      01 Nov 2021 21:35  Phos  3.4     11-02  Phos  3.4     11-01  Mg     1.7     11-02  Mg     2.1     11-01    TPro  5.9<L>  /  Alb  2.9<L>  /  TBili  0.7  /  DBili  x   /  AST  61<H>  /  ALT  44  /  AlkPhos  93  11-02  TPro  6.0  /  Alb  2.8<L>  /  TBili  0.7  /  DBili  x   /  AST  50<H>  /  ALT  35  /  AlkPhos  91  11-01      TELEMETRY: NSR with 14 bts of VT on overnight shift, several episodes of short bursts NSVT on day shift 11/2   	    ECG:  	< from: 12 Lead ECG (10.28.21 @ 20:05) >  Systolic  mmHg    Diastolic BP 50 mmHg    Ventricular Rate 101 BPM    Atrial Rate 101 BPM    QRS Duration 126 ms    Q-T Interval 364 ms    QTC Calculation(Bazett) 471 ms    R Axis 96 degrees    T Axis -40 degrees    Diagnosis Line *** POOR DATA QUALITY, INTERPRETATION MAY BE ADVERSELY AFFECTED  ATRIAL FIBRILLATION  RIGHTWARD AXIS  NON-SPECIFIC INTRA-VENTRICULAR CONDUCTION BLOCK  POSSIBLE INFERIOR INFARCT , AGE UNDETERMINED  CANNOT RULE OUT ANTERIOR INFARCT , AGE UNDETERMINED  ABNORMAL ECG  WHEN COMPARED WITH ECG OF 28-OCT-2021 20:04, (UNCONFIRMED)  NO SIGNIFICANT CHANGE WAS FOUND    < end of copied text >      RADIOLOGY:  < from: Xray Chest 1 View- PORTABLE-Routine (Xray Chest 1 View- PORTABLE-Routine .) (11.02.21 @ 09:44) >  INDICATION: Malignant neoplasm of pancreas. Rule out pulmonary edema.    IMPRESSION:    The heart is slightly enlarged. The lungs appear to be clear. No pleural effusion. No pneumothorax. A PICC line is seen on the right and the tip is in superior vena cava. A pacer is seen on the left. Orthopedic hardware is seen in thethoracolumbar spine. No change in the appearance the chest when compared to previous study done November 1, 2021 at 1:23 PM    < end of copied text >

## 2021-11-03 NOTE — PROGRESS NOTE ADULT - SUBJECTIVE AND OBJECTIVE BOX
HISTORY  79 y/o male w/ a PMHx of CAD s/p stents x2, HFrEF of ~35% s/p AICD, s/p TAVR, HTN, HLD, bilateral PEs, MSSA bacteremia secondary to right foot osteomyelitis s/p debridement, GERD, BPH, anxiety, insomnia, spinal stenosis, macular degeneration, left KARIE w/ revision x2, and pancreatic CA s/p neoadjuvant chemotherapy presenting s/p Whipple w/ a post-op course c/b hypovolemic shock, episodes of NSVT, and an allergic reaction questionable Dilaudid, re-admit to SICU for s/p shock for arrythmia.      24 HOUR EVENTS:  - PCA discontinued  - 40mg PO lasix daily started      NEURO  Exam: AOx4, following commands  Meds: acetaminophen     Tablet .. 650 milliGRAM(s) Oral every 6 hours PRN Mild Pain (1 - 3)  ALPRAZolam 0.25 milliGRAM(s) Oral three times a day PRN Anxiety  melatonin 6 milliGRAM(s) Oral at bedtime  oxyCODONE    IR 5 milliGRAM(s) Oral every 3 hours PRN Moderate Pain (4 - 6)  oxyCODONE    IR 10 milliGRAM(s) Oral every 4 hours PRN Severe Pain (7 - 10)  [x] Adequacy of sedation and pain control has been assessed and adjusted      RESPIRATORY  RR: 23 (11-02-21 @ 23:00) (14 - 28)  SpO2: 97% (11-02-21 @ 23:00) (93% - 100%)  Exam: unlabored respirations on room air  Meds: diphenhydrAMINE Injectable 25 milliGRAM(s) IV Push every 4 hours PRN Pruritus      CARDIOVASCULAR  HR: 77 (11-02-21 @ 23:00) (72 - 92)  BP: 97/54 (11-02-21 @ 23:00) (97/54 - 164/86)  BP(mean): 72 (11-02-21 @ 23:00) (72 - 119)  VBG - ( 01 Nov 2021 11:27 )  pH: 7.40  /  pCO2: 39    /  pO2: 36    / HCO3: 24    / Base Excess: -0.5  /  SaO2: 54.3   Lactate: 1.8    Exam: RRR  Cardiac Rhythm: normal sinus  Perfusion     [x]Adequate   [ ]Inadequate  Mentation   [x]Normal       [ ]Reduced  Extremities  [x]Warm         [ ]Cool  Volume Status [ ]Hypervolemic [x]Euvolemic [ ]Hypovolemic  Meds: aMIOdarone    Tablet 400 milliGRAM(s) Oral every 12 hours  furosemide    Tablet 40 milliGRAM(s) Oral daily  metoprolol tartrate 25 milliGRAM(s) Oral every 12 hours      GI/NUTRITION  Exam: softly distended, mild tenderness to palpation  Diet: Full Liquid Diet  Meds: pantoprazole    Tablet 40 milliGRAM(s) Oral before breakfast      GENITOURINARY  I&O's Detail    11-01 @ 07:01  -  11-02 @ 07:00  --------------------------------------------------------  IN:    Lactated Ringers: 260 mL    Oral Fluid: 220 mL  Total IN: 480 mL    OUT:    Bulb (mL): 190 mL    Voided (mL): 850 mL  Total OUT: 1040 mL    Total NET: -560 mL    11-02 @ 07:01  -  11-03 @ 02:48  --------------------------------------------------------  IN:    Lactated Ringers: 160 mL    Oral Fluid: 240 mL  Total IN: 400 mL    OUT:    Bulb (mL): 105 mL    Voided (mL): 1010 mL  Total OUT: 1115 mL    Total NET: -715 mL    11-02    133<L>  |  99  |  11  ----------------------------<  131<H>  4.2   |  24  |  0.72    Ca    8.2<L>      02 Nov 2021 21:48  Phos  3.4     11-02  Mg     1.7     11-02    TPro  5.9<L>  /  Alb  2.9<L>  /  TBili  0.7  /  DBili  x   /  AST  61<H>  /  ALT  44  /  AlkPhos  93  11-02    [ ] Jimenez catheter, indication: none  Meds: magnesium sulfate  IVPB 2 Gram(s) IV Intermittent once      HEMATOLOGIC  Meds: heparin   Injectable 5000 Unit(s) SubCutaneous every 8 hours  [x] VTE Prophylaxis                        8.3    7.25  )-----------( 205      ( 02 Nov 2021 21:48 )             26.5     PT/INR - ( 02 Nov 2021 21:48 )   PT: 13.6 sec;   INR: 1.14 ratio    PTT - ( 02 Nov 2021 21:48 )  PTT:26.6 sec  Transfusion     [ ] PRBC   [ ] Platelets   [ ] FFP   [ ] Cryoprecipitate      INFECTIOUS DISEASES  T(C): 37 (11-02-21 @ 23:00), Max: 37 (11-02-21 @ 23:00)  WBC Count: 7.25 K/uL (11-02 @ 21:48)    Recent Cultures:    Meds: influenza   Vaccine 0.5 milliLiter(s) IntraMuscular once      ENDOCRINE  Capillary Blood Glucose    Meds: insulin lispro (ADMELOG) corrective regimen sliding scale   SubCutaneous three times a day before meals  insulin lispro (ADMELOG) corrective regimen sliding scale   SubCutaneous at bedtime      ACCESS DEVICES:  [x] Peripheral IV  [ ] Central Venous Line	[ ] R	[ ] L	[ ] IJ	[ ] Fem	[ ] SC	Placed:   [ ] Arterial Line		[ ] R	[ ] L	[ ] Fem	[ ] Rad	[ ] Ax	Placed:   [ ] PICC:					[ ] Mediport  [ ] Urinary Catheter, Date Placed:   [x] Necessity of urinary, arterial, and venous catheters discussed      OTHER MEDICATIONS:  chlorhexidine 2% Cloths 1 Application(s) Topical <User Schedule>  dorzolamide 2%/timolol 0.5% Ophthalmic Solution 1 Drop(s) Both EYES two times a day  ketorolac 0.5% Ophthalmic Solution 1 Drop(s) Both EYES daily  latanoprost 0.005% Ophthalmic Solution 1 Drop(s) Both EYES at bedtime  lidocaine   4% Patch 1 Patch Transdermal daily    CODE STATUS: full code    IMAGING:

## 2021-11-03 NOTE — DIETITIAN INITIAL EVALUATION ADULT. - PERTINENT LABORATORY DATA
11-02 @ 21:48: Sodium 133<L>, Potassium 4.2, Calcium 8.2<L>, Magnesium 1.7, Phosphorus 3.4, BUN 11, Creatinine 0.72, Glucose 131<H>  Hemoglobin : 8.3 g/dL  Hematocrit : 26.5 %    A1C with Estimated Average Glucose Result: 5.3 % (09-22-21 @ 16:39)    POCT Blood Glucose.: 111 mg/dL (11-03-21 @ 12:18)  POCT Blood Glucose.: 83 mg/dL (11-03-21 @ 08:04)  POCT Blood Glucose.: 123 mg/dL (11-02-21 @ 21:10)  POCT Blood Glucose.: 106 mg/dL (11-02-21 @ 17:18)     LIVER FUNCTIONS - ( 02 Nov 2021 21:48 )  Alb: 2.9 g/dL / Pro: 5.9 g/dL / ALK PHOS: 93 U/L / ALT: 44 U/L / AST: 61 U/L / GGT: x

## 2021-11-03 NOTE — PROGRESS NOTE ADULT - SUBJECTIVE AND OBJECTIVE BOX
[facility] - Milo Thomas, Adolfo, Binta, Brittanie, Javier Don  Office Number:  [phone]    Patient resting comfortably in bed in NAD.  Laying flat with no respiratory distress.  No complaints of chest pain, dyspnea, palpitations, PND, or orthopnea.    F/U for:  VT    Telemetry:  NSR.  NSVT and PSVT noted    MEDICATIONS  (STANDING):  aMIOdarone    Tablet 400 milliGRAM(s) Oral every 12 hours  chlorhexidine 2% Cloths 1 Application(s) Topical <User Schedule>  dorzolamide 2%/timolol 0.5% Ophthalmic Solution 1 Drop(s) Both EYES two times a day  furosemide    Tablet 40 milliGRAM(s) Oral daily  heparin   Injectable 5000 Unit(s) SubCutaneous every 8 hours  influenza   Vaccine 0.5 milliLiter(s) IntraMuscular once  insulin lispro (ADMELOG) corrective regimen sliding scale   SubCutaneous three times a day before meals  insulin lispro (ADMELOG) corrective regimen sliding scale   SubCutaneous at bedtime  ketorolac 0.5% Ophthalmic Solution 1 Drop(s) Both EYES daily  latanoprost 0.005% Ophthalmic Solution 1 Drop(s) Both EYES at bedtime  lidocaine   4% Patch 1 Patch Transdermal daily  melatonin 6 milliGRAM(s) Oral at bedtime  metoprolol tartrate 25 milliGRAM(s) Oral every 12 hours  pantoprazole    Tablet 40 milliGRAM(s) Oral before breakfast    MEDICATIONS  (PRN):  acetaminophen     Tablet .. 650 milliGRAM(s) Oral every 6 hours PRN Mild Pain (1 - 3)  ALPRAZolam 0.25 milliGRAM(s) Oral three times a day PRN Anxiety  diphenhydrAMINE Injectable 25 milliGRAM(s) IV Push every 4 hours PRN Pruritus  oxyCODONE    IR 5 milliGRAM(s) Oral every 3 hours PRN Moderate Pain (4 - 6)  oxyCODONE    IR 10 milliGRAM(s) Oral every 4 hours PRN Severe Pain (7 - 10)      Allergies    Dilaudid (Anaphylaxis; Hives)        Vital Signs Last 24 Hrs  T(C): 36.3 (03 Nov 2021 05:00), Max: 37 (02 Nov 2021 23:00)  T(F): 97.3 (03 Nov 2021 05:00), Max: 98.6 (02 Nov 2021 23:00)  HR: 77 (03 Nov 2021 07:00) (72 - 92)  BP: 122/59 (03 Nov 2021 07:00) (97/54 - 164/86)  BP(mean): 85 (03 Nov 2021 07:00) (72 - 119)  RR: 20 (03 Nov 2021 07:00) (14 - 28)  SpO2: 100% (03 Nov 2021 07:00) (93% - 100%)    I&O's Summary    02 Nov 2021 07:01  -  03 Nov 2021 07:00  --------------------------------------------------------  IN: 400 mL / OUT: 1860 mL / NET: -1460 mL        ON EXAM:    Constitutional: NAD, awake   HEENT: Moist Mucous Membranes, Anicteric  Pulmonary: Decreased breath sounds b/l. No rales, crackles or wheeze appreciated.   Cardiovascular: Regular, S1 and S2, No murmurs, rubs, gallops or clicks  Gastrointestinal: Bowel Sounds present, soft, + tenderness.   Lymph: No peripheral edema. No lymphadenopathy.  Skin: No visible rashes or ulcers.  Psych:  Mood & affect appropriate for situation      LABS: All Labs Reviewed:                        8.3    7.25  )-----------( 205      ( 02 Nov 2021 21:48 )             26.5                         8.2    5.89  )-----------( 195      ( 01 Nov 2021 21:35 )             25.8                         9.1    6.78  )-----------( 205      ( 01 Nov 2021 11:29 )             27.5     02 Nov 2021 21:48    133    |  99     |  11     ----------------------------<  131    4.2     |  24     |  0.72   01 Nov 2021 21:35    136    |  103    |  15     ----------------------------<  115    4.2     |  23     |  0.70   01 Nov 2021 11:29    135    |  100    |  16     ----------------------------<  144    4.1     |  22     |  0.73     Ca    8.2        02 Nov 2021 21:48  Ca    8.0        01 Nov 2021 21:35  Ca    8.3        01 Nov 2021 11:29  Phos  3.4       02 Nov 2021 21:48  Phos  3.4       01 Nov 2021 21:35  Phos  3.6       01 Nov 2021 11:29  Mg     1.7       02 Nov 2021 21:48  Mg     2.1       01 Nov 2021 21:35  Mg     1.6       01 Nov 2021 11:29    TPro  5.9    /  Alb  2.9    /  TBili  0.7    /  DBili  x      /  AST  61     /  ALT  44     /  AlkPhos  93     02 Nov 2021 21:48  TPro  6.0    /  Alb  2.8    /  TBili  0.7    /  DBili  x      /  AST  50     /  ALT  35     /  AlkPhos  91     01 Nov 2021 21:35  TPro  6.1    /  Alb  2.9    /  TBili  0.8    /  DBili  x      /  AST  47     /  ALT  38     /  AlkPhos  96     01 Nov 2021 11:29    PT/INR - ( 02 Nov 2021 21:48 )   PT: 13.6 sec;   INR: 1.14 ratio         PTT - ( 02 Nov 2021 21:48 )  PTT:26.6 sec

## 2021-11-03 NOTE — DIETITIAN INITIAL EVALUATION ADULT. - REASON FOR ADMISSION
abd surgery    Per chart: "79 y/o male w/ a PMHx of CAD s/p stents x2, HFrEF of ~35% s/p AICD, s/p TAVR, HTN, HLD, bilateral PEs, MSSA bacteremia secondary to right foot osteomyelitis s/p debridement, GERD, BPH, anxiety, insomnia, spinal stenosis, macular degeneration, left KARIE w/ revision x2, and pancreatic CA s/p neoadjuvant chemotherapy presenting s/p Whipple w/ a post-op course c/b hypovolemic shock, episodes of NSVT, and an allergic reaction questionable Dilaudid, re-admit to SICU for s/p shock for arrythmia."

## 2021-11-03 NOTE — PROGRESS NOTE ADULT - SUBJECTIVE AND OBJECTIVE BOX
SURGERY DAILY PROGRESS NOTE:     24 HOUR EVENTS:  - PCA discontinued  - 40mg PO lasix daily started    SUBJECTIVE/ROS: Patient seen and evaluated on AM rounds. Patient reports that he has waxing and waning nausea and vomiting. States he is not eating much and has no appetite.   Denies chest pain and shortness of breath.       OBJECTIVE:    Vital Signs Last 24 Hrs  T(C): 36.8 (2021 07:00), Max: 37 (2021 23:00)  T(F): 98.3 (2021 07:00), Max: 98.6 (2021 23:00)  HR: 79 (2021 10:00) (72 - 92)  BP: 112/65 (2021 10:00) (97/54 - 164/86)  BP(mean): 80 (2021 10:00) (72 - 119)  RR: 22 (2021 10:00) (14 - 28)  SpO2: 96% (2021 10:00) (93% - 100%)  I&O's Detail    2021 07:01  -  2021 07:00  --------------------------------------------------------  IN:    IV PiggyBack: 50 mL    Lactated Ringers: 160 mL    Oral Fluid: 240 mL  Total IN: 450 mL    OUT:    Bulb (mL): 150 mL    Voided (mL): 1710 mL  Total OUT: 1860 mL    Total NET: -1410 mL      2021 07:01  -  2021 10:52  --------------------------------------------------------  IN:  Total IN: 0 mL    OUT:    Voided (mL): 1170 mL  Total OUT: 1170 mL    Total NET: -1170 mL        Daily     Daily Weight in k.7 (2021 01:00)  MEDICATIONS  (STANDING):  aMIOdarone    Tablet 400 milliGRAM(s) Oral every 12 hours  chlorhexidine 2% Cloths 1 Application(s) Topical <User Schedule>  dorzolamide 2%/timolol 0.5% Ophthalmic Solution 1 Drop(s) Both EYES two times a day  furosemide    Tablet 40 milliGRAM(s) Oral daily  heparin   Injectable 5000 Unit(s) SubCutaneous every 8 hours  influenza   Vaccine 0.5 milliLiter(s) IntraMuscular once  insulin lispro (ADMELOG) corrective regimen sliding scale   SubCutaneous three times a day before meals  insulin lispro (ADMELOG) corrective regimen sliding scale   SubCutaneous at bedtime  ketorolac 0.5% Ophthalmic Solution 1 Drop(s) Both EYES daily  latanoprost 0.005% Ophthalmic Solution 1 Drop(s) Both EYES at bedtime  lidocaine   4% Patch 1 Patch Transdermal daily  melatonin 6 milliGRAM(s) Oral at bedtime  metoprolol tartrate 25 milliGRAM(s) Oral every 12 hours  pantoprazole    Tablet 40 milliGRAM(s) Oral before breakfast    MEDICATIONS  (PRN):  acetaminophen     Tablet .. 650 milliGRAM(s) Oral every 6 hours PRN Mild Pain (1 - 3)  ALPRAZolam 0.25 milliGRAM(s) Oral three times a day PRN Anxiety  diphenhydrAMINE Injectable 25 milliGRAM(s) IV Push every 4 hours PRN Pruritus  oxyCODONE    IR 5 milliGRAM(s) Oral every 3 hours PRN Moderate Pain (4 - 6)  oxyCODONE    IR 10 milliGRAM(s) Oral every 4 hours PRN Severe Pain (7 - 10)      LABS:                        8.3    7.25  )-----------( 205      ( 2021 21:48 )             26.5         133<L>  |  99  |  11  ----------------------------<  131<H>  4.2   |  24  |  0.72    Ca    8.2<L>      2021 21:48  Phos  3.4       Mg     1.7         TPro  5.9<L>  /  Alb  2.9<L>  /  TBili  0.7  /  DBili  x   /  AST  61<H>  /  ALT  44  /  AlkPhos  93      PT/INR - ( 2021 21:48 )   PT: 13.6 sec;   INR: 1.14 ratio         PTT - ( 2021 21:48 )  PTT:26.6 sec        PHYSICAL EXAM:  Constitutional: well developed, well nourished, NAD  Respiratory: no respiratory distress, no subcostal retractions  Cardiovascular: S1, S2  Gastrointestinal: abdomen soft, nontender, nondistended,  Midline incision open, w packing, superior aspect of incision with fibrinous tissue  Extremities: FROM, warm

## 2021-11-03 NOTE — PROGRESS NOTE ADULT - ASSESSMENT
79 y/o male w/ a PMHx of CAD s/p stents x2, HFrEF of ~35% s/p Abbott ICD (hx of sustained VT s/p ATP 2016 - maintained on Sotalol 40mg BID), s/p TAVR (4/2021), HTN, HLD, PE's (on Xarelto), MSSA bacteremia secondary to right foot osteomyelitis s/p debridement s/p ICD extraction 9/28/21 and S-ICD implant 10/4/21, GERD, BPH, anxiety, insomnia, spinal stenosis, macular degeneration, left KARIE w/ revision x2, and pancreatic CA s/p neoadjuvant chemotherapy presenting s/p Whipple 10/26 w/ a post-op course c/b hypovolemic shock, episodes of NSVT and an episode of VT 11/1 resulting in ICD shock. This episode was associated with lightheadedness/presyncope. Interrogation performed revealing sustained VT with 1 appropriate shock s/p termination to sinus rhythm.   The patient had been off his Sotalol post Whipple procedure and it was resumed on 10/30. Of note, on prior admission in 9/2021 he had an episode of pAT 9/26/21 which met rate for VT zone, received ATPx4 and 1 shock. Patient is hemodynamically stable currently, in NSR with episodes of NSVT including 14 beats of VT this morning around 0400, patient states he was asleep and episodes have been asymptomatic. He denies CP, SOB, feeling palpitations, dizziness/lightheadedness.    1) HFrEF   2) VT   3) Bacteremia   4) pancreatic CA     -S/p Whipple procedure for pancreatic CA with post op VT requiring ICD shock   -Sotalol held post operatively, resumed for ~ 24 hours at the time of the VT episode. Sotalol now discontinued with Amio load started after Sotalol washout (400mg BID x1 weeks, THEN 200mg BID x1 week, THEN maintenance dose of 200mg once daily). Patient has received 1.3 grams so far   -Please obtain TSH. Monitor LFTs/TFTs/PFTs while on Amiodarone therapy. Patient will need outpatient ophthalmologic evaluation yearly while on Amiodarone  -On Lopressor 25mg BID, can uptitrate as tolerated   -Keep K>4, Mg>2.   -Restart Xarelto for history of PE when cleared by primary team    [phone]

## 2021-11-03 NOTE — DIETITIAN INITIAL EVALUATION ADULT. - ORAL INTAKE PTA/DIET HISTORY
Diet History: [pt interview pending]  No chewing/swallowing difficulty reported.   Allergies: NKFA  Home Nutrition Supplements: CoQ10, multivitamin, vit B6, cholecalciferol  Pancrealipase 2 caps tid Diet History: Unable to obtain at this time; RD will follow up as able  No chewing/swallowing difficulty reported.   Allergies: NKFA  Home Nutrition Supplements: CoQ10, multivitamin, vit B6, cholecalciferol  Pancrealipase 2 caps tid

## 2021-11-03 NOTE — DIETITIAN INITIAL EVALUATION ADULT. - OTHER INFO
GASTROINTESTINAL:  Last BM: 10/30, 11/1  Bowel Regimen:   Last emesis: 10/29, 10/30    NUTRITION STATUS:  - SSI tid before meals and bedtime    WEIGHT HISTORY: GASTROINTESTINAL:  Last BM: 10/30, 11/1  Bowel Regimen:   Last emesis: 10/29, 10/30    NUTRITION STATUS:  - SSI tid before meals and bedtime    WEIGHT HISTORY:  - Per HIE: 95.3kg (8/4/21), 97.5kg (9/20/21), 95.3kg (10/13/21)

## 2021-11-03 NOTE — CHART NOTE - NSCHARTNOTEFT_GEN_A_CORE
80M from [facility] with PMH pancreatic cancer (dx 3/2021, chemo) s/p ERCP s/p whipple 3/2021, HTN, HLD, CHF (EF: 35%), CAD s/p stents x2 (~15 years ago), AICD (implanted 2005, extracted and re-implantation 9/2021), TAVR (4/2021), bilateral PE (7/2021) on Xarelto, spinal stenosis, macular degeneration, GERD, BPH, Left THR (x2 revisions), left femur fracture (hardware), Osteomyelitis right foot s/p right hallux amputation 9/2021, MSSA bacteremia, explantation and reimplantation of AICD 10/4/21, PICC Line RUE was on IV antibiotics, recently changed to PO.     10/26 s/p Whipple surgery    Called to consult, after 2 visits and d/w daughter and RN will try to complete consult tomorrow. Pt w/ AMS, moments of lucency but do not last. Uncertain if 2/2 ICU psychosis/delirium, pt febrile, etc. Fentanyl PCA discontinued yesterday. Per daughter he used to hallucinate with post-op Fentanyl.  Primary team coming to evaluate. Pt appears to have pain control with current regimen, will not make any changes at this time.    Continue current regimen, will revisit tomorrow.      [facility]  [phone]

## 2021-11-04 PROBLEM — H91.90 UNSPECIFIED HEARING LOSS, UNSPECIFIED EAR: Chronic | Status: ACTIVE | Noted: 2021-10-20

## 2021-11-04 PROBLEM — M86.10 OTHER ACUTE OSTEOMYELITIS, UNSPECIFIED SITE: Chronic | Status: ACTIVE | Noted: 2021-10-20

## 2021-11-04 PROBLEM — R78.81 BACTEREMIA: Chronic | Status: ACTIVE | Noted: 2021-10-20

## 2021-11-04 PROBLEM — I26.99 OTHER PULMONARY EMBOLISM WITHOUT ACUTE COR PULMONALE: Chronic | Status: ACTIVE | Noted: 2021-10-20

## 2021-11-04 LAB
APPEARANCE UR: CLEAR — SIGNIFICANT CHANGE UP
BILIRUB UR-MCNC: NEGATIVE — SIGNIFICANT CHANGE UP
COLOR SPEC: SIGNIFICANT CHANGE UP
DIFF PNL FLD: NEGATIVE — SIGNIFICANT CHANGE UP
E FAECALIS DNA BLD POS QL NAA+NON-PROBE: SIGNIFICANT CHANGE UP
GLUCOSE BLDC GLUCOMTR-MCNC: 107 MG/DL — HIGH (ref 70–99)
GLUCOSE BLDC GLUCOMTR-MCNC: 110 MG/DL — HIGH (ref 70–99)
GLUCOSE BLDC GLUCOMTR-MCNC: 124 MG/DL — HIGH (ref 70–99)
GLUCOSE BLDC GLUCOMTR-MCNC: 143 MG/DL — HIGH (ref 70–99)
GLUCOSE UR QL: NEGATIVE — SIGNIFICANT CHANGE UP
GRAM STN FLD: SIGNIFICANT CHANGE UP
KETONES UR-MCNC: NEGATIVE — SIGNIFICANT CHANGE UP
LEUKOCYTE ESTERASE UR-ACNC: NEGATIVE — SIGNIFICANT CHANGE UP
METHOD TYPE: SIGNIFICANT CHANGE UP
NITRITE UR-MCNC: NEGATIVE — SIGNIFICANT CHANGE UP
PH UR: 7.5 — SIGNIFICANT CHANGE UP (ref 5–8)
PROT UR-MCNC: NEGATIVE — SIGNIFICANT CHANGE UP
SARS-COV-2 RNA SPEC QL NAA+PROBE: SIGNIFICANT CHANGE UP
SP GR SPEC: 1.03 — HIGH (ref 1.01–1.02)
SPECIMEN SOURCE: SIGNIFICANT CHANGE UP
SPECIMEN SOURCE: SIGNIFICANT CHANGE UP
TROPONIN T, HIGH SENSITIVITY RESULT: 76 NG/L — HIGH (ref 0–51)
TSH SERPL-MCNC: 4.88 UIU/ML — HIGH (ref 0.27–4.2)
UROBILINOGEN FLD QL: NEGATIVE — SIGNIFICANT CHANGE UP

## 2021-11-04 PROCEDURE — 70450 CT HEAD/BRAIN W/O DYE: CPT | Mod: 26

## 2021-11-04 PROCEDURE — 74177 CT ABD & PELVIS W/CONTRAST: CPT | Mod: 26

## 2021-11-04 PROCEDURE — 99233 SBSQ HOSP IP/OBS HIGH 50: CPT

## 2021-11-04 PROCEDURE — 71045 X-RAY EXAM CHEST 1 VIEW: CPT | Mod: 26

## 2021-11-04 PROCEDURE — 99232 SBSQ HOSP IP/OBS MODERATE 35: CPT

## 2021-11-04 PROCEDURE — 71260 CT THORAX DX C+: CPT | Mod: 26

## 2021-11-04 RX ORDER — CHLORHEXIDINE GLUCONATE 213 G/1000ML
1 SOLUTION TOPICAL
Refills: 0 | Status: DISCONTINUED | OUTPATIENT
Start: 2021-11-04 | End: 2021-11-15

## 2021-11-04 RX ORDER — ACETAMINOPHEN 500 MG
1000 TABLET ORAL EVERY 8 HOURS
Refills: 0 | Status: COMPLETED | OUTPATIENT
Start: 2021-11-04 | End: 2021-11-05

## 2021-11-04 RX ADMIN — OXYCODONE HYDROCHLORIDE 10 MILLIGRAM(S): 5 TABLET ORAL at 22:00

## 2021-11-04 RX ADMIN — Medication 1 DROP(S): at 18:05

## 2021-11-04 RX ADMIN — AMIODARONE HYDROCHLORIDE 400 MILLIGRAM(S): 400 TABLET ORAL at 05:10

## 2021-11-04 RX ADMIN — HEPARIN SODIUM 5000 UNIT(S): 5000 INJECTION INTRAVENOUS; SUBCUTANEOUS at 05:10

## 2021-11-04 RX ADMIN — DORZOLAMIDE HYDROCHLORIDE TIMOLOL MALEATE 1 DROP(S): 20; 5 SOLUTION/ DROPS OPHTHALMIC at 18:04

## 2021-11-04 RX ADMIN — Medication 6 MILLIGRAM(S): at 21:31

## 2021-11-04 RX ADMIN — PANTOPRAZOLE SODIUM 40 MILLIGRAM(S): 20 TABLET, DELAYED RELEASE ORAL at 08:40

## 2021-11-04 RX ADMIN — OXYCODONE HYDROCHLORIDE 5 MILLIGRAM(S): 5 TABLET ORAL at 18:58

## 2021-11-04 RX ADMIN — HEPARIN SODIUM 5000 UNIT(S): 5000 INJECTION INTRAVENOUS; SUBCUTANEOUS at 13:20

## 2021-11-04 RX ADMIN — OXYCODONE HYDROCHLORIDE 10 MILLIGRAM(S): 5 TABLET ORAL at 21:31

## 2021-11-04 RX ADMIN — OXYCODONE HYDROCHLORIDE 5 MILLIGRAM(S): 5 TABLET ORAL at 08:40

## 2021-11-04 RX ADMIN — Medication 1000 MILLIGRAM(S): at 22:00

## 2021-11-04 RX ADMIN — Medication 25 MILLIGRAM(S): at 05:27

## 2021-11-04 RX ADMIN — Medication 400 MILLIGRAM(S): at 21:32

## 2021-11-04 RX ADMIN — LATANOPROST 1 DROP(S): 0.05 SOLUTION/ DROPS OPHTHALMIC; TOPICAL at 21:33

## 2021-11-04 RX ADMIN — PIPERACILLIN AND TAZOBACTAM 25 GRAM(S): 4; .5 INJECTION, POWDER, LYOPHILIZED, FOR SOLUTION INTRAVENOUS at 10:18

## 2021-11-04 RX ADMIN — Medication 400 MILLIGRAM(S): at 12:18

## 2021-11-04 RX ADMIN — AMIODARONE HYDROCHLORIDE 400 MILLIGRAM(S): 400 TABLET ORAL at 18:04

## 2021-11-04 RX ADMIN — CHLORHEXIDINE GLUCONATE 1 APPLICATION(S): 213 SOLUTION TOPICAL at 11:50

## 2021-11-04 RX ADMIN — OXYCODONE HYDROCHLORIDE 10 MILLIGRAM(S): 5 TABLET ORAL at 14:00

## 2021-11-04 RX ADMIN — OXYCODONE HYDROCHLORIDE 5 MILLIGRAM(S): 5 TABLET ORAL at 09:40

## 2021-11-04 RX ADMIN — OXYCODONE HYDROCHLORIDE 10 MILLIGRAM(S): 5 TABLET ORAL at 05:08

## 2021-11-04 RX ADMIN — Medication 1000 MILLIGRAM(S): at 13:10

## 2021-11-04 RX ADMIN — Medication 40 MILLIGRAM(S): at 05:10

## 2021-11-04 RX ADMIN — PIPERACILLIN AND TAZOBACTAM 25 GRAM(S): 4; .5 INJECTION, POWDER, LYOPHILIZED, FOR SOLUTION INTRAVENOUS at 19:29

## 2021-11-04 RX ADMIN — PIPERACILLIN AND TAZOBACTAM 25 GRAM(S): 4; .5 INJECTION, POWDER, LYOPHILIZED, FOR SOLUTION INTRAVENOUS at 01:58

## 2021-11-04 RX ADMIN — OXYCODONE HYDROCHLORIDE 5 MILLIGRAM(S): 5 TABLET ORAL at 18:03

## 2021-11-04 RX ADMIN — Medication 166.67 MILLIGRAM(S): at 08:49

## 2021-11-04 RX ADMIN — OXYCODONE HYDROCHLORIDE 10 MILLIGRAM(S): 5 TABLET ORAL at 13:20

## 2021-11-04 RX ADMIN — OXYCODONE HYDROCHLORIDE 10 MILLIGRAM(S): 5 TABLET ORAL at 05:30

## 2021-11-04 RX ADMIN — Medication 25 MILLIGRAM(S): at 18:05

## 2021-11-04 RX ADMIN — HEPARIN SODIUM 5000 UNIT(S): 5000 INJECTION INTRAVENOUS; SUBCUTANEOUS at 21:32

## 2021-11-04 RX ADMIN — Medication 166.67 MILLIGRAM(S): at 18:03

## 2021-11-04 NOTE — PROGRESS NOTE ADULT - ASSESSMENT
80M from [facility] with PMH pancreatic cancer (dx 3/2021, chemo) s/p ERCP s/p whipple 3/2021, HTN, HLD, CHF (EF: 35%), CAD s/p stents x2 (~15 years ago), AICD (implanted 2005, extracted and re-implantation 9/2021), TAVR (4/2021), bilateral PE (7/2021) on Xarelto, spinal stenosis, macular degeneration, GERD, BPH, Left THR (x2 revisions), left femur fracture (hardware), Osteomyelitis right foot s/p right hallux amputation 9/2021, MSSA bacteremia, explantation and reimplantation of AICD 10/4/21, PICC Line RUE was on IV antibiotics, recently changed to PO.   10/26 s/p Whipple surgery.    To IR tomorrow for drainage.  Add OxyContin 10 mg Q 12 hours.  Continue Oxy IR 5 mg or 10 mg as ordered.  After IR tomorrow, can add IV dilaudid 0.5 mg Q 4 hours PRN severe pain x 24 hours.  Add robaxin 750 mg Q 6 hours PRN spasm.    Monitor for sedation and respiratory depression.  Bowel regimen- pt has not had a BM in several days.  OOB/ PT per medicine team.      Minutes spent on total encounter: 25 minutes      [facility]  [phone]

## 2021-11-04 NOTE — PROGRESS NOTE ADULT - ASSESSMENT
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 He required icd extraction, and a subq icd was replaced. He is now s/p Whipple procedure    - now off of pressor support with improved BP  - 11/1 with sustained vt and subsequent icd shock, possibly in the setting of volume overload  - sotalol stopped, and now on amio load  - trend lfts  - cont metoprolol, with goal to titrate up as tolerated  - no further nsvt noted on telemetry, cont to monitor    - known history of systolic hf (mod lv dysfunction and mod ms)  - echo 10/27 with moderate ms at HR 80, tavr in place. severe LV dysfunction  - appears more compensated from a hf perspective  - Lasix PO started  - Please continue to maintain strict I/Os, monitor daily weights, Cr, and K.   - entresto remains on hold, and will eventually restart it    - history of PE in 7/2021, and had been on xarelto.  is at elevated risk of vte noting pancreatic malignancy and relatively recent pe  - resume ac when safe from a surgical perspective. is presently on dvt ppx sq heparin    - Other cardiovascular workup will depend on clinical course.  - will follow with you

## 2021-11-04 NOTE — PROGRESS NOTE ADULT - ASSESSMENT
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 Patient now s/p Whipple 10/27, recovered in SICU, now transferred to the floor. NSVT w/ appropriate shock from AICD on 11/1, transferred to SICU for further management. Now on floor. CT 11/3 with Loculated fluid collection adjacent to the afferent jejunal loop in the right upper quadrant.    Plan  - IR consult for drainage of fluid collection  - tele monitoring  - f/u fever work up  - Anesthesia following for pain  - FLD   - PT wound care consult placed for wound vac therapy   - Lasix 20mg   - DVT PPX; SQH  - EP recommendations appreciated  - Cardiology recommendations appreciate    [phone]  [facility]

## 2021-11-04 NOTE — PROVIDER CONTACT NOTE (CRITICAL VALUE NOTIFICATION) - RECOMMENDATIONS
Provider made aware, continue antibiotics, will continue to monitor
provider notified, continue antibiotics
No new orders at this time.

## 2021-11-04 NOTE — PROGRESS NOTE ADULT - ASSESSMENT
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 Patient now s/p Whipple 10/27, recovered in SICU, now transferred to the floor. NSVT w/ appropriate shock from AICD on 11/1, transferred to SICU for further management.    Plan    - Abx: Zosyn, Vanc  - Diet: FLD; NPO after midnight  - IR perform fluid collection drainage tomorrow   - hold AM anticoagulation  - COVID PCR  - PT wound care consult placed for wound vac therapy   - Lasix 20mg   - DVT PPX; SQH  - EP recommendations appreciated  - Cardiology recommendations appreciate: amio load, trend LFTs, c/w metoprolol   - Pain control  [phone]  [facility]

## 2021-11-04 NOTE — CHART NOTE - NSCHARTNOTEFT_GEN_A_CORE
Patient seen and examined 11/3 at 21:00 after call from bedside RN for fever to 102 and altered mental status  Pt awake, disoriented (AAOx1 to self), and states he is not in any pain  Hemodynamics significant for Afib on EKG though rate controlled with HR 90 and BP wnl. O2 94% on RA  Abdomen soft, nontender, no rebound or guarding    Plan  Start broad spectrum IV Abx  CT head for delerium, chest/abdomen/pelvis to evaluate for any other pathology  Cardiology f/u for Afib  F/u blood cultures, labs  Discussed with Dr. Mercer

## 2021-11-04 NOTE — PROGRESS NOTE ADULT - SUBJECTIVE AND OBJECTIVE BOX
[facility]    Milo Thomas, Adolfo, Binta, Brittanie, Arian, Javier      [phone]    CHIEF COMPLAINT: Patient is a 80y old  Male who presents with a chief complaint of abd surgery    Per chart: "79 y/o male w/ a PMHx of CAD s/p stents x2, HFrEF of ~35% s/p AICD, s/p TAVR, HTN, HLD, bilateral PEs, MSSA bacteremia secondary to right foot osteomyelitis s/p debridement, GERD, BPH, anxiety, insomnia, spinal stenosis, macular degeneration, left KARIE w/ revision x2, and pancreatic CA s/p neoadjuvant chemotherapy presenting s/p Whipple w/ a post-op course c/b hypovolemic shock, episodes of NSVT, and an allergic reaction questionable Dilaudid, re-admit to SICU for s/p shock for arrythmia." (03 Nov 2021 13:47)      Follow Up: vt, s/p whipple, hx of pe    Interim history: The patient reports no new symptoms.  Denies chest discomfort and shortness of breath.  Persistent abdominal pain.  No new neurologic symptoms.      MEDICATIONS  (STANDING):  acetaminophen   IVPB .. 1000 milliGRAM(s) IV Intermittent every 8 hours  aMIOdarone    Tablet 400 milliGRAM(s) Oral every 12 hours  dorzolamide 2%/timolol 0.5% Ophthalmic Solution 1 Drop(s) Both EYES two times a day  furosemide    Tablet 40 milliGRAM(s) Oral daily  heparin   Injectable 5000 Unit(s) SubCutaneous every 8 hours  influenza   Vaccine 0.5 milliLiter(s) IntraMuscular once  insulin lispro (ADMELOG) corrective regimen sliding scale   SubCutaneous three times a day before meals  insulin lispro (ADMELOG) corrective regimen sliding scale   SubCutaneous at bedtime  ketorolac 0.5% Ophthalmic Solution 1 Drop(s) Both EYES daily  latanoprost 0.005% Ophthalmic Solution 1 Drop(s) Both EYES at bedtime  lidocaine   4% Patch 1 Patch Transdermal daily  melatonin 6 milliGRAM(s) Oral at bedtime  metoprolol tartrate 25 milliGRAM(s) Oral every 12 hours  pantoprazole    Tablet 40 milliGRAM(s) Oral before breakfast  piperacillin/tazobactam IVPB.. 3.375 Gram(s) IV Intermittent every 8 hours  vancomycin  IVPB 1250 milliGRAM(s) IV Intermittent every 12 hours    MEDICATIONS  (PRN):  diphenhydrAMINE Injectable 25 milliGRAM(s) IV Push every 4 hours PRN Pruritus  oxyCODONE    IR 10 milliGRAM(s) Oral every 4 hours PRN Severe Pain (7 - 10)  oxyCODONE    IR 5 milliGRAM(s) Oral every 3 hours PRN Moderate Pain (4 - 6)      REVIEW OF SYSTEMS:  eye, ent, GI, , allergic, dermatologic, musculoskeletal and neurologic are negative except as described above    Vital Signs Last 24 Hrs  T(C): 36.7 (04 Nov 2021 04:12), Max: 39.3 (03 Nov 2021 20:40)  T(F): 98 (04 Nov 2021 04:12), Max: 102.7 (03 Nov 2021 20:40)  HR: 80 (04 Nov 2021 04:12) (75 - 92)  BP: 108/68 (04 Nov 2021 04:12) (105/60 - 129/58)  BP(mean): 75 (03 Nov 2021 16:00) (75 - 86)  RR: 20 (04 Nov 2021 04:12) (17 - 26)  SpO2: 93% (04 Nov 2021 04:12) (93% - 99%)    I&O's Summary    03 Nov 2021 07:01  -  04 Nov 2021 07:00  --------------------------------------------------------  IN: 100 mL / OUT: 2675 mL / NET: -2575 mL    04 Nov 2021 07:01  -  04 Nov 2021 08:59  --------------------------------------------------------  IN: 0 mL / OUT: 200 mL / NET: -200 mL        Telemetry past 24h: sr pacs    PHYSICAL EXAM:    Constitutional: well-nourished, well-developed, NAD   HEENT:  MMM, sclerae anicteric, conjunctivae clear, no oral cyanosis.  Pulmonary: Non-labored, breath sounds are clear bilaterally, No wheezing, rales or rhonchi  Cardiovascular: Regular, S1 and S2.  No murmur.  No rubs, gallops or clicks  Gastrointestinal: Bowel Sounds present, soft, tender to palp.   Lymph: No peripheral edema.   Neurological: Alert, no focal deficits  Skin: No rashes.  Psych:  Mood & affect appropriate    LABS: All Labs Reviewed:                        9.1    9.26  )-----------( 193      ( 03 Nov 2021 20:55 )             28.8                         8.3    7.25  )-----------( 205      ( 02 Nov 2021 21:48 )             26.5                         8.2    5.89  )-----------( 195      ( 01 Nov 2021 21:35 )             25.8     03 Nov 2021 20:55    132    |  95     |  11     ----------------------------<  120    4.4     |  23     |  0.86   02 Nov 2021 21:48    133    |  99     |  11     ----------------------------<  131    4.2     |  24     |  0.72   01 Nov 2021 21:35    136    |  103    |  15     ----------------------------<  115    4.2     |  23     |  0.70     Ca    8.4        03 Nov 2021 20:55  Ca    8.2        02 Nov 2021 21:48  Ca    8.0        01 Nov 2021 21:35  Phos  3.4       02 Nov 2021 21:48  Phos  3.4       01 Nov 2021 21:35  Phos  3.6       01 Nov 2021 11:29  Mg     1.7       02 Nov 2021 21:48  Mg     2.1       01 Nov 2021 21:35  Mg     1.6       01 Nov 2021 11:29    TPro  5.9    /  Alb  2.9    /  TBili  0.7    /  DBili  x      /  AST  61     /  ALT  44     /  AlkPhos  93     02 Nov 2021 21:48  TPro  6.0    /  Alb  2.8    /  TBili  0.7    /  DBili  x      /  AST  50     /  ALT  35     /  AlkPhos  91     01 Nov 2021 21:35  TPro  6.1    /  Alb  2.9    /  TBili  0.8    /  DBili  x      /  AST  47     /  ALT  38     /  AlkPhos  96     01 Nov 2021 11:29    PT/INR - ( 02 Nov 2021 21:48 )   PT: 13.6 sec;   INR: 1.14 ratio         PTT - ( 02 Nov 2021 21:48 )  PTT:26.6 sec  CARDIAC MARKERS ( 03 Nov 2021 20:55 )  x     / x     / 14 U/L / x     / <1.0 ng/mL      Blood Culture:         RADIOLOGY:    EKG:    Echo:

## 2021-11-04 NOTE — PROGRESS NOTE ADULT - SUBJECTIVE AND OBJECTIVE BOX
CARDIOLOGY CONSULT PROGRESS NOTE  JUANIS DE SANTIAGO  [id number]    INTERVAL EVENTS:  - tele:   -     MEDICATIONS  (STANDING):  aMIOdarone    Tablet 400 milliGRAM(s) Oral every 12 hours  dorzolamide 2%/timolol 0.5% Ophthalmic Solution 1 Drop(s) Both EYES two times a day  furosemide    Tablet 40 milliGRAM(s) Oral daily  heparin   Injectable 5000 Unit(s) SubCutaneous every 8 hours  influenza   Vaccine 0.5 milliLiter(s) IntraMuscular once  insulin lispro (ADMELOG) corrective regimen sliding scale   SubCutaneous three times a day before meals  insulin lispro (ADMELOG) corrective regimen sliding scale   SubCutaneous at bedtime  ketorolac 0.5% Ophthalmic Solution 1 Drop(s) Both EYES daily  latanoprost 0.005% Ophthalmic Solution 1 Drop(s) Both EYES at bedtime  lidocaine   4% Patch 1 Patch Transdermal daily  melatonin 6 milliGRAM(s) Oral at bedtime  metoprolol tartrate 25 milliGRAM(s) Oral every 12 hours  pantoprazole    Tablet 40 milliGRAM(s) Oral before breakfast  piperacillin/tazobactam IVPB.. 3.375 Gram(s) IV Intermittent every 8 hours  vancomycin  IVPB 1250 milliGRAM(s) IV Intermittent every 12 hours    MEDICATIONS  (PRN):  acetaminophen     Tablet .. 650 milliGRAM(s) Oral every 6 hours PRN Mild Pain (1 - 3)  diphenhydrAMINE Injectable 25 milliGRAM(s) IV Push every 4 hours PRN Pruritus  oxyCODONE    IR 10 milliGRAM(s) Oral every 4 hours PRN Severe Pain (7 - 10)  oxyCODONE    IR 5 milliGRAM(s) Oral every 3 hours PRN Moderate Pain (4 - 6)    Allergies    Dilaudid (Anaphylaxis; Hives)    Intolerances      P/E:  Adult Advanced Hemodynamics Last 24 Hrs  CVP(mm Hg): --  CVP(cm H2O): --  CO: --  CI: --  PA: --  PA(mean): --  PCWP: --  SVR: --  SVRI: --  PVR: --  PVRI: --  Vital Signs Last 24 Hrs  T(C): 36.7 (04 Nov 2021 04:12), Max: 39.3 (03 Nov 2021 20:40)  T(F): 98 (04 Nov 2021 04:12), Max: 102.7 (03 Nov 2021 20:40)  HR: 80 (04 Nov 2021 04:12) (75 - 92)  BP: 108/68 (04 Nov 2021 04:12) (105/60 - 129/58)  BP(mean): 75 (03 Nov 2021 16:00) (75 - 86)  RR: 20 (04 Nov 2021 04:12) (17 - 26)  SpO2: 93% (04 Nov 2021 04:12) (93% - 99%)  Daily     Daily   I&O's Detail    03 Nov 2021 07:01  -  04 Nov 2021 07:00  --------------------------------------------------------  IN:    IV PiggyBack: 100 mL  Total IN: 100 mL    OUT:    Bulb (mL): 300 mL    Voided (mL): 2375 mL  Total OUT: 2675 mL    Total NET: -2575 mL        I&O's Summary    03 Nov 2021 07:01  -  04 Nov 2021 07:00  --------------------------------------------------------  IN: 100 mL / OUT: 2675 mL / NET: -2575 mL      - gen: well appearing, laying in hospital bed, NAD  - HEENT: MMM, NCAT  - heart: RRR, nml S1/S2, no RMG  - lungs: CTABL, nml WOB  - abd: soft, NTND, NABS  - ext: WWP, PPP, no IZZY    RELEVANT RECENT LABS/IMAGING/STUDIES:                        9.1    9.26  )-----------( 193      ( 03 Nov 2021 20:55 )             28.8     11-03    132<L>  |  95<L>  |  11  ----------------------------<  120<H>  4.4   |  23  |  0.86    Ca    8.4      03 Nov 2021 20:55  Phos  3.4     11-02  Mg     1.7     11-02    TPro  5.9<L>  /  Alb  2.9<L>  /  TBili  0.7  /  DBili  x   /  AST  61<H>  /  ALT  44  /  AlkPhos  93  11-02    LIVER FUNCTIONS - ( 02 Nov 2021 21:48 )  Alb: 2.9 g/dL / Pro: 5.9 g/dL / ALK PHOS: 93 U/L / ALT: 44 U/L / AST: 61 U/L / GGT: x           PT/INR - ( 02 Nov 2021 21:48 )   PT: 13.6 sec;   INR: 1.14 ratio         PTT - ( 02 Nov 2021 21:48 )  PTT:26.6 sec  --------------------------------------  ABG - ( 03 Nov 2021 21:35 )  pH, Arterial: 7.48  pH, Blood: x     /  pCO2: 35    /  pO2: 75    / HCO3: 26    / Base Excess: 2.8   /  SaO2: 95.1              CARDIAC MARKERS ( 03 Nov 2021 20:55 )  x     / x     / 14 U/L / x     / <1.0 ng/mL      --------------------------------------   CARDIOLOGY CONSULT PROGRESS NOTE  JUANIS DE SANTIAGO  [id number]    INTERVAL EVENTS:  - tele:   -     MEDICATIONS  (STANDING):  aMIOdarone    Tablet 400 milliGRAM(s) Oral every 12 hours  dorzolamide 2%/timolol 0.5% Ophthalmic Solution 1 Drop(s) Both EYES two times a day  furosemide    Tablet 40 milliGRAM(s) Oral daily  heparin   Injectable 5000 Unit(s) SubCutaneous every 8 hours  influenza   Vaccine 0.5 milliLiter(s) IntraMuscular once  insulin lispro (ADMELOG) corrective regimen sliding scale   SubCutaneous three times a day before meals  insulin lispro (ADMELOG) corrective regimen sliding scale   SubCutaneous at bedtime  ketorolac 0.5% Ophthalmic Solution 1 Drop(s) Both EYES daily  latanoprost 0.005% Ophthalmic Solution 1 Drop(s) Both EYES at bedtime  lidocaine   4% Patch 1 Patch Transdermal daily  melatonin 6 milliGRAM(s) Oral at bedtime  metoprolol tartrate 25 milliGRAM(s) Oral every 12 hours  pantoprazole    Tablet 40 milliGRAM(s) Oral before breakfast  piperacillin/tazobactam IVPB.. 3.375 Gram(s) IV Intermittent every 8 hours  vancomycin  IVPB 1250 milliGRAM(s) IV Intermittent every 12 hours    MEDICATIONS  (PRN):  acetaminophen     Tablet .. 650 milliGRAM(s) Oral every 6 hours PRN Mild Pain (1 - 3)  diphenhydrAMINE Injectable 25 milliGRAM(s) IV Push every 4 hours PRN Pruritus  oxyCODONE    IR 10 milliGRAM(s) Oral every 4 hours PRN Severe Pain (7 - 10)  oxyCODONE    IR 5 milliGRAM(s) Oral every 3 hours PRN Moderate Pain (4 - 6)    Allergies  Dilaudid (Anaphylaxis; Hives)    P/E:  Vital Signs Last 24 Hrs  T(C): 36.7 (04 Nov 2021 04:12), Max: 39.3 (03 Nov 2021 20:40)  T(F): 98 (04 Nov 2021 04:12), Max: 102.7 (03 Nov 2021 20:40)  HR: 80 (04 Nov 2021 04:12) (75 - 92)  BP: 108/68 (04 Nov 2021 04:12) (105/60 - 129/58)  BP(mean): 75 (03 Nov 2021 16:00) (75 - 86)  RR: 20 (04 Nov 2021 04:12) (17 - 26)  SpO2: 93% (04 Nov 2021 04:12) (93% - 99%)    I&O's Summary  03 Nov 2021 07:01  -  04 Nov 2021 07:00  --------------------------------------------------------  IN: 100 mL / OUT: 2675 mL / NET: -2575 mL    - gen: well appearing, laying in hospital bed, NAD  - HEENT: MMM, NCAT  - heart: RRR, nml S1/S2, no RMG  - lungs: CTABL, nml WOB  - abd: soft, NTND, NABS  - ext: WWP, PPP, no IZZY    RELEVANT RECENT LABS/IMAGING/STUDIES:                        9.1    9.26  )-----------( 193      ( 03 Nov 2021 20:55 )             28.8     11-03    132<L>  |  95<L>  |  11  ----------------------------<  120<H>  4.4   |  23  |  0.86    Ca    8.4      03 Nov 2021 20:55  Phos  3.4     11-02  Mg     1.7     11-02    TPro  5.9<L>  /  Alb  2.9<L>  /  TBili  0.7  /  DBili  x   /  AST  61<H>  /  ALT  44  /  AlkPhos  93  11-02    LIVER FUNCTIONS - ( 02 Nov 2021 21:48 )  Alb: 2.9 g/dL / Pro: 5.9 g/dL / ALK PHOS: 93 U/L / ALT: 44 U/L / AST: 61 U/L / GGT: x           PT/INR - ( 02 Nov 2021 21:48 )   PT: 13.6 sec;   INR: 1.14 ratio      PTT - ( 02 Nov 2021 21:48 )  PTT:26.6 sec    ABG - ( 03 Nov 2021 21:35 )  pH, Arterial: 7.48  pH, Blood: x     /  pCO2: 35    /  pO2: 75    / HCO3: 26    / Base Excess: 2.8   /  SaO2: 95.1      CARDIAC MARKERS ( 03 Nov 2021 20:55 )  x     / x     / 14 U/L / x     / <1.0 ng/mL CARDIOLOGY CONSULT PROGRESS NOTE  JUANIS DE SANTIAGO  [id number]    INTERVAL EVENTS:  - tele:   - episode of MAT/WAP o/n    MEDICATIONS  (STANDING):  aMIOdarone    Tablet 400 milliGRAM(s) Oral every 12 hours  dorzolamide 2%/timolol 0.5% Ophthalmic Solution 1 Drop(s) Both EYES two times a day  furosemide    Tablet 40 milliGRAM(s) Oral daily  heparin   Injectable 5000 Unit(s) SubCutaneous every 8 hours  influenza   Vaccine 0.5 milliLiter(s) IntraMuscular once  insulin lispro (ADMELOG) corrective regimen sliding scale   SubCutaneous three times a day before meals  insulin lispro (ADMELOG) corrective regimen sliding scale   SubCutaneous at bedtime  ketorolac 0.5% Ophthalmic Solution 1 Drop(s) Both EYES daily  latanoprost 0.005% Ophthalmic Solution 1 Drop(s) Both EYES at bedtime  lidocaine   4% Patch 1 Patch Transdermal daily  melatonin 6 milliGRAM(s) Oral at bedtime  metoprolol tartrate 25 milliGRAM(s) Oral every 12 hours  pantoprazole    Tablet 40 milliGRAM(s) Oral before breakfast  piperacillin/tazobactam IVPB.. 3.375 Gram(s) IV Intermittent every 8 hours  vancomycin  IVPB 1250 milliGRAM(s) IV Intermittent every 12 hours    MEDICATIONS  (PRN):  acetaminophen     Tablet .. 650 milliGRAM(s) Oral every 6 hours PRN Mild Pain (1 - 3)  diphenhydrAMINE Injectable 25 milliGRAM(s) IV Push every 4 hours PRN Pruritus  oxyCODONE    IR 10 milliGRAM(s) Oral every 4 hours PRN Severe Pain (7 - 10)  oxyCODONE    IR 5 milliGRAM(s) Oral every 3 hours PRN Moderate Pain (4 - 6)    Allergies  Dilaudid (Anaphylaxis; Hives)    P/E:  Vital Signs Last 24 Hrs  T(C): 36.7 (04 Nov 2021 04:12), Max: 39.3 (03 Nov 2021 20:40)  T(F): 98 (04 Nov 2021 04:12), Max: 102.7 (03 Nov 2021 20:40)  HR: 80 (04 Nov 2021 04:12) (75 - 92)  BP: 108/68 (04 Nov 2021 04:12) (105/60 - 129/58)  BP(mean): 75 (03 Nov 2021 16:00) (75 - 86)  RR: 20 (04 Nov 2021 04:12) (17 - 26)  SpO2: 93% (04 Nov 2021 04:12) (93% - 99%)    I&O's Summary  03 Nov 2021 07:01  -  04 Nov 2021 07:00  --------------------------------------------------------  IN: 100 mL / OUT: 2675 mL / NET: -2575 mL    - gen: well appearing, laying in hospital bed, NAD  - HEENT: MMM, NCAT  - heart: RRR, nml S1/S2, no RMG  - lungs: CTABL, nml WOB  - abd: soft, NTND, NABS  - ext: WWP, PPP, no IZZY    RELEVANT RECENT LABS/IMAGING/STUDIES:                        9.1    9.26  )-----------( 193      ( 03 Nov 2021 20:55 )             28.8     11-03    132<L>  |  95<L>  |  11  ----------------------------<  120<H>  4.4   |  23  |  0.86    Ca    8.4      03 Nov 2021 20:55  Phos  3.4     11-02  Mg     1.7     11-02    TPro  5.9<L>  /  Alb  2.9<L>  /  TBili  0.7  /  DBili  x   /  AST  61<H>  /  ALT  44  /  AlkPhos  93  11-02    LIVER FUNCTIONS - ( 02 Nov 2021 21:48 )  Alb: 2.9 g/dL / Pro: 5.9 g/dL / ALK PHOS: 93 U/L / ALT: 44 U/L / AST: 61 U/L / GGT: x           PT/INR - ( 02 Nov 2021 21:48 )   PT: 13.6 sec;   INR: 1.14 ratio      PTT - ( 02 Nov 2021 21:48 )  PTT:26.6 sec    ABG - ( 03 Nov 2021 21:35 )  pH, Arterial: 7.48  pH, Blood: x     /  pCO2: 35    /  pO2: 75    / HCO3: 26    / Base Excess: 2.8   /  SaO2: 95.1      CARDIAC MARKERS ( 03 Nov 2021 20:55 )  x     / x     / 14 U/L / x     / <1.0 ng/mL CARDIOLOGY CONSULT PROGRESS NOTE  JUANIS DE SANTIAGO  MRN-[id number]    INTERVAL EVENTS:  - tele: brief atrial tach, sinus, 60-80s  - having abd pain, ROS neg for palpitations, CP, SOB, dizziness    MEDICATIONS  (STANDING):  aMIOdarone    Tablet 400 milliGRAM(s) Oral every 12 hours  dorzolamide 2%/timolol 0.5% Ophthalmic Solution 1 Drop(s) Both EYES two times a day  furosemide    Tablet 40 milliGRAM(s) Oral daily  heparin   Injectable 5000 Unit(s) SubCutaneous every 8 hours  influenza   Vaccine 0.5 milliLiter(s) IntraMuscular once  insulin lispro (ADMELOG) corrective regimen sliding scale   SubCutaneous three times a day before meals  insulin lispro (ADMELOG) corrective regimen sliding scale   SubCutaneous at bedtime  ketorolac 0.5% Ophthalmic Solution 1 Drop(s) Both EYES daily  latanoprost 0.005% Ophthalmic Solution 1 Drop(s) Both EYES at bedtime  lidocaine   4% Patch 1 Patch Transdermal daily  melatonin 6 milliGRAM(s) Oral at bedtime  metoprolol tartrate 25 milliGRAM(s) Oral every 12 hours  pantoprazole    Tablet 40 milliGRAM(s) Oral before breakfast  piperacillin/tazobactam IVPB.. 3.375 Gram(s) IV Intermittent every 8 hours  vancomycin  IVPB 1250 milliGRAM(s) IV Intermittent every 12 hours    MEDICATIONS  (PRN):  acetaminophen     Tablet .. 650 milliGRAM(s) Oral every 6 hours PRN Mild Pain (1 - 3)  diphenhydrAMINE Injectable 25 milliGRAM(s) IV Push every 4 hours PRN Pruritus  oxyCODONE    IR 10 milliGRAM(s) Oral every 4 hours PRN Severe Pain (7 - 10)  oxyCODONE    IR 5 milliGRAM(s) Oral every 3 hours PRN Moderate Pain (4 - 6)    Allergies  Dilaudid (Anaphylaxis; Hives)    P/E:  Vital Signs Last 24 Hrs  T(C): 36.7 (04 Nov 2021 04:12), Max: 39.3 (03 Nov 2021 20:40)  T(F): 98 (04 Nov 2021 04:12), Max: 102.7 (03 Nov 2021 20:40)  HR: 80 (04 Nov 2021 04:12) (75 - 92)  BP: 108/68 (04 Nov 2021 04:12) (105/60 - 129/58)  BP(mean): 75 (03 Nov 2021 16:00) (75 - 86)  RR: 20 (04 Nov 2021 04:12) (17 - 26)  SpO2: 93% (04 Nov 2021 04:12) (93% - 99%)    I&O's Summary  03 Nov 2021 07:01  -  04 Nov 2021 07:00  --------------------------------------------------------  IN: 100 mL / OUT: 2675 mL / NET: -2575 mL    - gen: well appearing, laying in hospital bed, NAD  - HEENT: MMM, NCAT  - heart: RRR, nml S1/S2, no RMG  - lungs: CTABL, nml WOB  - abd: soft, abd binder in place, NTND, NABS  - ext: WWP, PPP, no IZZY    RELEVANT RECENT LABS/IMAGING/STUDIES:                        9.1    9.26  )-----------( 193      ( 03 Nov 2021 20:55 )             28.8     11-03    132<L>  |  95<L>  |  11  ----------------------------<  120<H>  4.4   |  23  |  0.86    Ca    8.4      03 Nov 2021 20:55  Phos  3.4     11-02  Mg     1.7     11-02    TPro  5.9<L>  /  Alb  2.9<L>  /  TBili  0.7  /  DBili  x   /  AST  61<H>  /  ALT  44  /  AlkPhos  93  11-02    LIVER FUNCTIONS - ( 02 Nov 2021 21:48 )  Alb: 2.9 g/dL / Pro: 5.9 g/dL / ALK PHOS: 93 U/L / ALT: 44 U/L / AST: 61 U/L / GGT: x           PT/INR - ( 02 Nov 2021 21:48 )   PT: 13.6 sec;   INR: 1.14 ratio      PTT - ( 02 Nov 2021 21:48 )  PTT:26.6 sec    ABG - ( 03 Nov 2021 21:35 )  pH, Arterial: 7.48  pH, Blood: x     /  pCO2: 35    /  pO2: 75    / HCO3: 26    / Base Excess: 2.8   /  SaO2: 95.1      CARDIAC MARKERS ( 03 Nov 2021 20:55 )  x     / x     / 14 U/L / x     / <1.0 ng/mL

## 2021-11-04 NOTE — PROVIDER CONTACT NOTE (CRITICAL VALUE NOTIFICATION) - ASSESSMENT
Noted confusion. Per family, pt baseline AxOx4, without confusion,. Noted Denies any chest pain. Pt states feeling nauseous. Zofran given per order. Noted relief.
patient asymptomatic
patient asymptomatic

## 2021-11-04 NOTE — PROVIDER CONTACT NOTE (CRITICAL VALUE NOTIFICATION) - ACTION/TREATMENT ORDERED:
Provider made aware, continue antibiotics, will continue to monitor
provider notified, continue antibiotics

## 2021-11-04 NOTE — PROGRESS NOTE ADULT - ASSESSMENT
Golden Seymour MD  Cardiology Fellow - PGY 4    For all New Consults and Questions:  [email or web address]   Login: [email or web address]    *** Recommendations are preliminary until cosigned by the attending. ### incomplete ###    79yo M h/o HTN, HLD, pancreatic cancer (s/p chemo, Whipple 10/2021 c/b VT), VTE/PE (on rivaroxaban), CAD (s/p PCIx2), HFrEF (ICM EF 35%), sAS (s/p TAVR), VT (2016, s/p Abbot ICD, previously on sotalol, c/b MSSA bacteremia 2/2 R foot OM s/p ICD extraction 09/2021 w/ S-ICH implant 10/2021)    1) HFrEF   2) VT   3) Bacteremia   4) pancreatic CA     -S/p Whipple procedure for pancreatic CA with post op VT requiring ICD shock   -Sotalol held post operatively, resumed for ~ 24 hours at the time of the VT episode. Sotalol now discontinued with Amio load started after Sotalol washout (400mg BID x1 weeks, THEN 200mg BID x1 week, THEN maintenance dose of 200mg once daily). Patient has received 1.3 grams so far   -Please obtain TSH. Monitor LFTs/TFTs/PFTs while on Amiodarone therapy. Patient will need outpatient ophthalmologic evaluation yearly while on Amiodarone  -On Lopressor 25mg BID, can uptitrate as tolerated   -Keep K>4, Mg>2.   -Restart Xarelto for history of PE when cleared by primary team    ### incomplete ###  Golden Seymour MD  Cardiology Fellow - PGY 4    For all New Consults and Questions:  [email or web address]   Login: [email or web address]    *** Recommendations are preliminary until cosigned by the attending. 79yo M h/o HTN, HLD, pancreatic cancer (s/p chemo, Whipple 10/2021 c/b VT), VTE/PE (on rivaroxaban), CAD (s/p PCIx2, 15y ago), HFrEF (ICM EF 35%), sAS (s/p TAVR), VT (2016, s/p Abbot ICD, previously on sotalol, c/b MSSA bacteremia 2/2 R foot OM s/p ICD extraction 09/2021 w/ S-ICH implant 10/2021)    1) HFrEF   2) VT   3) Bacteremia   4) pancreatic CA     -S/p Whipple procedure for pancreatic CA with post op VT requiring ICD shock   -Sotalol held post operatively, resumed for ~ 24 hours at the time of the VT episode. Sotalol now discontinued with Amio load started after Sotalol washout (400mg BID x1 weeks, THEN 200mg BID x1 week, THEN maintenance dose of 200mg once daily).  -Please obtain TSH. Monitor LFTs/TFTs/PFTs while on Amiodarone therapy. Patient will need outpatient ophthalmologic evaluation yearly while on Amiodarone  -On Lopressor 25mg BID, can uptitrate as tolerated   -Keep K>4, Mg>2.   -Restart Xarelto for history of PE when cleared by primary team    Golden Seymour MD  Cardiology Fellow - PGY 4    For all New Consults and Questions:  [email or web address]   Login: [email or web address]    *** Recommendations are preliminary until cosigned by the attending. 81yo M h/o HTN, HLD, pancreatic cancer (s/p chemo, Whipple 10/2021 c/b VT), VTE/PE (on rivaroxaban), CAD (s/p PCIx2, 15y ago), HFrEF (ICM EF 35%), sAS (s/p TAVR), VT (2016, s/p Abbot ICD, previously on sotalol, c/b MSSA bacteremia 2/2 R foot OM s/p ICD extraction 09/2021 w/ S-ICH implant 10/2021)    1) HFrEF   2) VT   3) Bacteremia   4) pancreatic CA     - S/p Whipple procedure for pancreatic CA with post op VT requiring ICD shock   - Sotalol held post operatively, resumed for ~ 24 hours at the time of the VT episode. Sotalol now discontinued with Amio load started after Sotalol washout (400mg BID x1 weeks, THEN 200mg BID x1 week, THEN maintenance dose of 200mg once daily).  - Please obtain TSH. Monitor LFTs/TFTs/PFTs while on Amiodarone therapy. Patient will need outpatient ophthalmologic evaluation yearly while on Amiodarone  - On Lopressor 25mg BID, can uptitrate as tolerated   - Keep K>4, Mg>2.   - Restart Xarelto for history of PE when cleared by primary team  - please call EP back with any further questions    Golden Seymour MD  Cardiology Fellow - PGY 4    For all New Consults and Questions:  [email or web address]   Login: [email or web address]    *** Recommendations are preliminary until cosigned by the attending.

## 2021-11-04 NOTE — PROGRESS NOTE ADULT - SUBJECTIVE AND OBJECTIVE BOX
CHIEF COMPLAINT:  Patient is a 80y old  Male who presents with a chief complaint of abd pain    Interval HPI:   80M from [facility] with PMH pancreatic cancer (dx 3/2021, chemo) s/p ERCP s/p whipple 3/2021, HTN, HLD, CHF (EF: 35%), CAD s/p stents x2 (~15 years ago), AICD (implanted 2005, extracted and re-implantation 9/2021), TAVR (4/2021), bilateral PE (7/2021) on Xarelto, spinal stenosis, macular degeneration, GERD, BPH, Left THR (x2 revisions), left femur fracture (hardware), Osteomyelitis right foot s/p right hallux amputation 9/2021, MSSA bacteremia, explantation and reimplantation of AICD 10/4/21, PICC Line RUE was on IV antibiotics, recently changed to PO.   10/26 s/p Whipple surgery    Current Pain Score: 7/10    Physical Exam:  Seen at bedside; NAD; endorsing R and LUQ pain.  A+Ox3; following commands; answers questions appropriately; speech clear and fluent.  Has relief of abdominal pain with the Oxy IR but it does not last.  States pain is burning and feels tight/ twisting abdominal pain.  Has trialed Neurontin in the past without effect.  Appetite fair; voiding; no BM in days.  Was OOB to chair this AM.    T(C): 36.9 (11-04-21 @ 11:18)  HR: 73 (11-04-21 @ 11:18)  BP: 103/58 (11-04-21 @ 11:18)  RR: 18 (11-04-21 @ 11:18)  SpO2: 98% (11-04-21 @ 11:18)    Pertinent labs, radiology, additional studies:                        9.1    9.26  )-----------( 193      ( 03 Nov 2021 20:55 )             28.8   11-03    132<L>  |  95<L>  |  11  ----------------------------<  120<H>  4.4   |  23  |  0.86    Ca    8.4      03 Nov 2021 20:55  Phos  3.4     11-02  Mg     1.7     11-02    TPro  5.9<L>  /  Alb  2.9<L>  /  TBili  0.7  /  DBili  x   /  AST  61<H>  /  ALT  44  /  AlkPhos  93  11-02

## 2021-11-04 NOTE — PROGRESS NOTE ADULT - SUBJECTIVE AND OBJECTIVE BOX
SURGERY DAILY PROGRESS NOTE:        SUBJECTIVE/ROS: Patient seen and evaluated on AM rounds. Patient reports abdominal pain to palpation.    Denies chest pain and shortness of breath.        OBJECTIVE:    Vital Signs Last 24 Hrs  T(C): 36.8 (2021 07:00), Max: 37 (2021 23:00)  T(F): 98.3 (2021 07:00), Max: 98.6 (2021 23:00)  HR: 79 (2021 10:00) (72 - 92)  BP: 112/65 (2021 10:00) (97/54 - 164/86)  BP(mean): 80 (2021 10:00) (72 - 119)  RR: 22 (2021 10:00) (14 - 28)  SpO2: 96% (2021 10:00) (93% - 100%)  I&O's Detail    2021 07:01  -  2021 07:00  --------------------------------------------------------  IN:    IV PiggyBack: 50 mL    Lactated Ringers: 160 mL    Oral Fluid: 240 mL  Total IN: 450 mL    OUT:    Bulb (mL): 150 mL    Voided (mL): 1710 mL  Total OUT: 1860 mL    Total NET: -1410 mL      2021 07:01  -  2021 10:52  --------------------------------------------------------  IN:  Total IN: 0 mL    OUT:    Voided (mL): 1170 mL  Total OUT: 1170 mL    Total NET: -1170 mL        Daily     Daily Weight in k.7 (2021 01:00)  MEDICATIONS  (STANDING):  aMIOdarone    Tablet 400 milliGRAM(s) Oral every 12 hours  chlorhexidine 2% Cloths 1 Application(s) Topical <User Schedule>  dorzolamide 2%/timolol 0.5% Ophthalmic Solution 1 Drop(s) Both EYES two times a day  furosemide    Tablet 40 milliGRAM(s) Oral daily  heparin   Injectable 5000 Unit(s) SubCutaneous every 8 hours  influenza   Vaccine 0.5 milliLiter(s) IntraMuscular once  insulin lispro (ADMELOG) corrective regimen sliding scale   SubCutaneous three times a day before meals  insulin lispro (ADMELOG) corrective regimen sliding scale   SubCutaneous at bedtime  ketorolac 0.5% Ophthalmic Solution 1 Drop(s) Both EYES daily  latanoprost 0.005% Ophthalmic Solution 1 Drop(s) Both EYES at bedtime  lidocaine   4% Patch 1 Patch Transdermal daily  melatonin 6 milliGRAM(s) Oral at bedtime  metoprolol tartrate 25 milliGRAM(s) Oral every 12 hours  pantoprazole    Tablet 40 milliGRAM(s) Oral before breakfast    MEDICATIONS  (PRN):  acetaminophen     Tablet .. 650 milliGRAM(s) Oral every 6 hours PRN Mild Pain (1 - 3)  ALPRAZolam 0.25 milliGRAM(s) Oral three times a day PRN Anxiety  diphenhydrAMINE Injectable 25 milliGRAM(s) IV Push every 4 hours PRN Pruritus  oxyCODONE    IR 5 milliGRAM(s) Oral every 3 hours PRN Moderate Pain (4 - 6)  oxyCODONE    IR 10 milliGRAM(s) Oral every 4 hours PRN Severe Pain (7 - 10)      LABS:                        8.3    7.25  )-----------( 205      ( 2021 21:48 )             26.5     11-02    133<L>  |  99  |  11  ----------------------------<  131<H>  4.2   |  24  |  0.72    Ca    8.2<L>      2021 21:48  Phos  3.4     11-  Mg     1.7     11-    TPro  5.9<L>  /  Alb  2.9<L>  /  TBili  0.7  /  DBili  x   /  AST  61<H>  /  ALT  44  /  AlkPhos  93  11-02    PT/INR - ( 2021 21:48 )   PT: 13.6 sec;   INR: 1.14 ratio         PTT - ( 2021 21:48 )  PTT:26.6 sec        PHYSICAL EXAM:  Constitutional: well developed, well nourished, NAD  Respiratory: no respiratory distress, no subcostal retractions  Cardiovascular: S1, S2  Gastrointestinal: abdomen soft, nontender, nondistended,  Midline incision open, w packing, superior aspect of incision with fibrinous tissue  Extremities: FROM, warm

## 2021-11-04 NOTE — CONSULT NOTE ADULT - ASSESSMENT
Interventional Radiology    Evaluate for Procedure: abdominal collection drainage s/p Whipple procedure    HPI: 79 y/o male w/ a PMHx of CAD s/p stents x2, HFrEF of ~35% s/p AICD, s/p TAVR, HTN, HLD, bilateral PEs, MSSA bacteremia secondary to right foot osteomyelitis s/p debridement, GERD, BPH, anxiety, insomnia, spinal stenosis, macular degeneration, left KARIE w/ revision x2, and pancreatic CA s/p neoadjuvant chemotherapy presenting s/p Whipple w/ a post-op course c/b hypovolemic shock, episodes of NSVT, and an allergic reaction questionable Dilaudid, re-admit to SICU for s/p shock for arrythmia. Pt found to have Right upper quadrant collection on CT. IR consulted for drainage.    Allergies: Dilaudid (Anaphylaxis; Hives)    Medications (Abx/Cardiac/Anticoagulation/Blood Products)  aMIOdarone    Tablet: 400 milliGRAM(s) Oral (11-04 @ 05:10)  furosemide    Tablet: 40 milliGRAM(s) Oral (11-04 @ 05:10)  heparin   Injectable: 5000 Unit(s) SubCutaneous (11-04 @ 05:10)  metoprolol tartrate: 25 milliGRAM(s) Oral (11-04 @ 05:27)  piperacillin/tazobactam IVPB.: 200 mL/Hr IV Intermittent (11-03 @ 21:33)  piperacillin/tazobactam IVPB..: 25 mL/Hr IV Intermittent (11-04 @ 10:18)  vancomycin  IVPB: 166.67 mL/Hr IV Intermittent (11-04 @ 08:49)    Data:    T(C): 36.9  HR: 73  BP: 103/58  RR: 18  SpO2: 98%    -WBC 9.26 / HgB 9.1 / Hct 28.8 / Plt 193  -Na 132 / Cl 95 / BUN 11 / Glucose 120  -K 4.4 / CO2 23 / Cr 0.86  -ALT -- / Alk Phos -- / T.Bili --  -INR 1.14 / PTT 26.6      Radiology: CT abdomen/pelvis 11/4/21 Postop changes subsequent to Whipple procedure.Loculated fluid collection adjacent to the afferent jejunal loop in the right upper quadrant.    Assessment/Plan: 79 yo M s/p Whipple procedure with fluid collection in Right upper quadrant. Pt has been febrile.    -- IR will plan to perform drainage of fluid tomorrow  -- NPO at midnight  -- hold a.m. anticoagulation  -- please complete IR pre-procedure note  -- please maintain COVID PCR within 5 days of planned procedure   -- please place IR procedure request order under Dr. Valdez

## 2021-11-04 NOTE — PROVIDER CONTACT NOTE (CRITICAL VALUE NOTIFICATION) - SITUATION
blood culture from 11/3/2021 shows growth in aerobic and anaerobic bottle gram positive cocci in pairs and chains
Admitted from [facility]
Blood culture from 11/03/2021 preliminary result  shows growth in aerobic bottle gram positive cocci pairs and chains

## 2021-11-04 NOTE — CHART NOTE - NSCHARTNOTEFT_GEN_A_CORE
Was called for new Afib w/ RVR for patient. He was HDS but notable fever of 102 overnight as well as delirium. At bedside, patient was AAOx3 and able to follow commands appropriately. Resident informed me he was given IV metoprolol which decreased his HR from 110s to 80s. EKG shows Multifocal atrial tachycardia/Wandering pacemaker. Advised to give patient metoprolol earlier. Patient denied any chest pain, SOB but reports mild dizziness. He also notes abd pain from his recent surgery. Can continue with amiodarone load and metoprolol.

## 2021-11-04 NOTE — PROGRESS NOTE ADULT - SUBJECTIVE AND OBJECTIVE BOX
RED SURGERY DAILY PROGRESS NOTE:     SUBJECTIVE/ROS: Patient was febrile overnight to 102 and had AMS in addition to rapid a fib overnight.  on am rounds mental status improved. fever work up send  Denies nausea, vomiting, chest pain, shortness of breath     MEDICATIONS  (STANDING):  acetaminophen   IVPB .. 1000 milliGRAM(s) IV Intermittent every 8 hours  aMIOdarone    Tablet 400 milliGRAM(s) Oral every 12 hours  chlorhexidine 4% Liquid 1 Application(s) Topical <User Schedule>  dorzolamide 2%/timolol 0.5% Ophthalmic Solution 1 Drop(s) Both EYES two times a day  furosemide    Tablet 40 milliGRAM(s) Oral daily  heparin   Injectable 5000 Unit(s) SubCutaneous every 8 hours  influenza   Vaccine 0.5 milliLiter(s) IntraMuscular once  insulin lispro (ADMELOG) corrective regimen sliding scale   SubCutaneous three times a day before meals  insulin lispro (ADMELOG) corrective regimen sliding scale   SubCutaneous at bedtime  ketorolac 0.5% Ophthalmic Solution 1 Drop(s) Both EYES daily  latanoprost 0.005% Ophthalmic Solution 1 Drop(s) Both EYES at bedtime  lidocaine   4% Patch 1 Patch Transdermal daily  melatonin 6 milliGRAM(s) Oral at bedtime  metoprolol tartrate 25 milliGRAM(s) Oral every 12 hours  pantoprazole    Tablet 40 milliGRAM(s) Oral before breakfast  piperacillin/tazobactam IVPB.. 3.375 Gram(s) IV Intermittent every 8 hours  vancomycin  IVPB 1250 milliGRAM(s) IV Intermittent every 12 hours    MEDICATIONS  (PRN):  diphenhydrAMINE Injectable 25 milliGRAM(s) IV Push every 4 hours PRN Pruritus  oxyCODONE    IR 10 milliGRAM(s) Oral every 4 hours PRN Severe Pain (7 - 10)  oxyCODONE    IR 5 milliGRAM(s) Oral every 3 hours PRN Moderate Pain (4 - 6)      OBJECTIVE:    Vital Signs Last 24 Hrs  T(C): 36.9 (2021 11:18), Max: 39.3 (2021 20:40)  T(F): 98.4 (2021 11:18), Max: 102.7 (2021 20:40)  HR: 73 (2021 11:18) (73 - 92)  BP: 103/58 (2021 11:18) (103/58 - 129/58)  BP(mean): 75 (2021 16:00) (75 - 83)  RR: 18 (2021 11:18) (18 - 26)  SpO2: 98% (2021 11:18) (93% - 99%)        I&O's Detail    2021 07:01  -  2021 07:00  --------------------------------------------------------  IN:    IV PiggyBack: 100 mL  Total IN: 100 mL    OUT:    Bulb (mL): 300 mL    Voided (mL): 2375 mL  Total OUT: 2675 mL    Total NET: -2575 mL      2021 07:01  -  2021 11:42  --------------------------------------------------------  IN:  Total IN: 0 mL    OUT:    Voided (mL): 600 mL  Total OUT: 600 mL    Total NET: -600 mL          Daily     Daily Weight in k.2 (2021 08:22)    LABS:                        9.1    9.26  )-----------( 193      ( 2021 20:55 )             28.8     11-03    132<L>  |  95<L>  |  11  ----------------------------<  120<H>  4.4   |  23  |  0.86    Ca    8.4      2021 20:55  Phos  3.4     11-02  Mg     1.7     11-02    TPro  5.9<L>  /  Alb  2.9<L>  /  TBili  0.7  /  DBili  x   /  AST  61<H>  /  ALT  44  /  AlkPhos  93  11-02    PT/INR - ( 2021 21:48 )   PT: 13.6 sec;   INR: 1.14 ratio         PTT - ( 2021 21:48 )  PTT:26.6 sec  Urinalysis Basic - ( 2021 06:34 )    Color: Light Yellow / Appearance: Clear / S.029 / pH: x  Gluc: x / Ketone: Negative  / Bili: Negative / Urobili: Negative   Blood: x / Protein: Negative / Nitrite: Negative   Leuk Esterase: Negative / RBC: x / WBC x   Sq Epi: x / Non Sq Epi: x / Bacteria: x                PHYSICAL EXAM:    Constitutional: well developed, well nourished, NAD  Respiratory: no respiratory distress, no subcostal retractions  Cardiovascular: S1, S2  Gastrointestinal: abdomen soft, nontender, nondistended,  Midline incision open, w packing, superior aspect of incision with fibrinous tissue  Extremities: FROM, warm

## 2021-11-04 NOTE — PROVIDER CONTACT NOTE (CRITICAL VALUE NOTIFICATION) - BACKGROUND
Noted temp of 102.1 @ 2000. Stat lab work sent per MD.
malignant neoplasm of pancreas
malignant neoplasm of pancrease

## 2021-11-05 ENCOUNTER — RESULT REVIEW (OUTPATIENT)
Age: 81
End: 2021-11-05

## 2021-11-05 LAB
ALBUMIN FLD-MCNC: 2.5 G/DL — SIGNIFICANT CHANGE UP
ALBUMIN SERPL ELPH-MCNC: 2.9 G/DL — LOW (ref 3.3–5)
ALP SERPL-CCNC: 86 U/L — SIGNIFICANT CHANGE UP (ref 40–120)
ALT FLD-CCNC: 21 U/L — SIGNIFICANT CHANGE UP (ref 10–45)
AMYLASE FLD-CCNC: 12 U/L — SIGNIFICANT CHANGE UP
ANION GAP SERPL CALC-SCNC: 11 MMOL/L — SIGNIFICANT CHANGE UP (ref 5–17)
APTT BLD: 31.4 SEC — SIGNIFICANT CHANGE UP (ref 27.5–35.5)
AST SERPL-CCNC: 19 U/L — SIGNIFICANT CHANGE UP (ref 10–40)
B PERT IGG+IGM PNL SER: CLEAR — SIGNIFICANT CHANGE UP
BILIRUB SERPL-MCNC: 0.6 MG/DL — SIGNIFICANT CHANGE UP (ref 0.2–1.2)
BUN SERPL-MCNC: 10 MG/DL — SIGNIFICANT CHANGE UP (ref 7–23)
CALCIUM SERPL-MCNC: 8.8 MG/DL — SIGNIFICANT CHANGE UP (ref 8.4–10.5)
CHLORIDE SERPL-SCNC: 99 MMOL/L — SIGNIFICANT CHANGE UP (ref 96–108)
CO2 SERPL-SCNC: 27 MMOL/L — SIGNIFICANT CHANGE UP (ref 22–31)
COLOR FLD: YELLOW — SIGNIFICANT CHANGE UP
CREAT FLD-MCNC: 0.97 MG/DL — SIGNIFICANT CHANGE UP
CREAT SERPL-MCNC: 0.94 MG/DL — SIGNIFICANT CHANGE UP (ref 0.5–1.3)
EOSINOPHIL # FLD: 1 % — SIGNIFICANT CHANGE UP
FLUID INTAKE SUBSTANCE CLASS: SIGNIFICANT CHANGE UP
FLUID SEGMENTED GRANULOCYTES: 37 % — SIGNIFICANT CHANGE UP
GLUCOSE BLDC GLUCOMTR-MCNC: 122 MG/DL — HIGH (ref 70–99)
GLUCOSE BLDC GLUCOMTR-MCNC: 138 MG/DL — HIGH (ref 70–99)
GLUCOSE BLDC GLUCOMTR-MCNC: 144 MG/DL — HIGH (ref 70–99)
GLUCOSE BLDC GLUCOMTR-MCNC: 97 MG/DL — SIGNIFICANT CHANGE UP (ref 70–99)
GLUCOSE FLD-MCNC: 70 MG/DL — SIGNIFICANT CHANGE UP
GLUCOSE SERPL-MCNC: 99 MG/DL — SIGNIFICANT CHANGE UP (ref 70–99)
GRAM STN FLD: SIGNIFICANT CHANGE UP
HCT VFR BLD CALC: 27.8 % — LOW (ref 39–50)
HGB BLD-MCNC: 8.9 G/DL — LOW (ref 13–17)
INR BLD: 1.04 RATIO — SIGNIFICANT CHANGE UP (ref 0.88–1.16)
LDH SERPL L TO P-CCNC: 333 U/L — SIGNIFICANT CHANGE UP
LYMPHOCYTES # FLD: 58 % — SIGNIFICANT CHANGE UP
MAGNESIUM SERPL-MCNC: 1.9 MG/DL — SIGNIFICANT CHANGE UP (ref 1.6–2.6)
MCHC RBC-ENTMCNC: 31.9 PG — SIGNIFICANT CHANGE UP (ref 27–34)
MCHC RBC-ENTMCNC: 32 GM/DL — SIGNIFICANT CHANGE UP (ref 32–36)
MCV RBC AUTO: 99.6 FL — SIGNIFICANT CHANGE UP (ref 80–100)
MONOS+MACROS # FLD: 4 % — SIGNIFICANT CHANGE UP
NRBC # BLD: 0 /100 WBCS — SIGNIFICANT CHANGE UP (ref 0–0)
PHOSPHATE SERPL-MCNC: 4.4 MG/DL — SIGNIFICANT CHANGE UP (ref 2.5–4.5)
PLATELET # BLD AUTO: 186 K/UL — SIGNIFICANT CHANGE UP (ref 150–400)
POTASSIUM SERPL-MCNC: 4.5 MMOL/L — SIGNIFICANT CHANGE UP (ref 3.5–5.3)
POTASSIUM SERPL-SCNC: 4.5 MMOL/L — SIGNIFICANT CHANGE UP (ref 3.5–5.3)
PROT FLD-MCNC: 4.5 G/DL — SIGNIFICANT CHANGE UP
PROT SERPL-MCNC: 6.4 G/DL — SIGNIFICANT CHANGE UP (ref 6–8.3)
PROTHROM AB SERPL-ACNC: 12.5 SEC — SIGNIFICANT CHANGE UP (ref 10.6–13.6)
RBC # BLD: 2.79 M/UL — LOW (ref 4.2–5.8)
RBC # FLD: 14.4 % — SIGNIFICANT CHANGE UP (ref 10.3–14.5)
RCV VOL RI: 1040 /UL — HIGH (ref 0–0)
SODIUM SERPL-SCNC: 137 MMOL/L — SIGNIFICANT CHANGE UP (ref 135–145)
SPECIMEN SOURCE: SIGNIFICANT CHANGE UP
TOTAL NUCLEATED CELL COUNT, BODY FLUID: 27 /UL — SIGNIFICANT CHANGE UP
TSH SERPL-MCNC: 5.33 UIU/ML — HIGH (ref 0.27–4.2)
TUBE TYPE: SIGNIFICANT CHANGE UP
VANCOMYCIN TROUGH SERPL-MCNC: 12.6 UG/ML — SIGNIFICANT CHANGE UP (ref 10–20)
WBC # BLD: 6.1 K/UL — SIGNIFICANT CHANGE UP (ref 3.8–10.5)
WBC # FLD AUTO: 6.1 K/UL — SIGNIFICANT CHANGE UP (ref 3.8–10.5)

## 2021-11-05 PROCEDURE — 88305 TISSUE EXAM BY PATHOLOGIST: CPT | Mod: 26

## 2021-11-05 PROCEDURE — 99232 SBSQ HOSP IP/OBS MODERATE 35: CPT | Mod: GC

## 2021-11-05 PROCEDURE — 88112 CYTOPATH CELL ENHANCE TECH: CPT | Mod: 26

## 2021-11-05 PROCEDURE — 49406 IMAGE CATH FLUID PERI/RETRO: CPT

## 2021-11-05 PROCEDURE — 99233 SBSQ HOSP IP/OBS HIGH 50: CPT

## 2021-11-05 PROCEDURE — 99223 1ST HOSP IP/OBS HIGH 75: CPT | Mod: GC

## 2021-11-05 RX ORDER — DAPTOMYCIN 500 MG/10ML
800 INJECTION, POWDER, LYOPHILIZED, FOR SOLUTION INTRAVENOUS EVERY 24 HOURS
Refills: 0 | Status: DISCONTINUED | OUTPATIENT
Start: 2021-11-05 | End: 2021-11-08

## 2021-11-05 RX ORDER — ONDANSETRON 8 MG/1
4 TABLET, FILM COATED ORAL ONCE
Refills: 0 | Status: COMPLETED | OUTPATIENT
Start: 2021-11-05 | End: 2021-11-05

## 2021-11-05 RX ORDER — SENNA PLUS 8.6 MG/1
1 TABLET ORAL DAILY
Refills: 0 | Status: DISCONTINUED | OUTPATIENT
Start: 2021-11-05 | End: 2021-11-15

## 2021-11-05 RX ADMIN — PIPERACILLIN AND TAZOBACTAM 25 GRAM(S): 4; .5 INJECTION, POWDER, LYOPHILIZED, FOR SOLUTION INTRAVENOUS at 09:00

## 2021-11-05 RX ADMIN — Medication 1 DROP(S): at 18:35

## 2021-11-05 RX ADMIN — Medication 25 MILLIGRAM(S): at 05:57

## 2021-11-05 RX ADMIN — LATANOPROST 1 DROP(S): 0.05 SOLUTION/ DROPS OPHTHALMIC; TOPICAL at 21:12

## 2021-11-05 RX ADMIN — Medication 400 MILLIGRAM(S): at 06:01

## 2021-11-05 RX ADMIN — OXYCODONE HYDROCHLORIDE 10 MILLIGRAM(S): 5 TABLET ORAL at 06:30

## 2021-11-05 RX ADMIN — PIPERACILLIN AND TAZOBACTAM 25 GRAM(S): 4; .5 INJECTION, POWDER, LYOPHILIZED, FOR SOLUTION INTRAVENOUS at 18:47

## 2021-11-05 RX ADMIN — AMIODARONE HYDROCHLORIDE 400 MILLIGRAM(S): 400 TABLET ORAL at 05:57

## 2021-11-05 RX ADMIN — Medication 25 MILLIGRAM(S): at 18:40

## 2021-11-05 RX ADMIN — ONDANSETRON 4 MILLIGRAM(S): 8 TABLET, FILM COATED ORAL at 21:12

## 2021-11-05 RX ADMIN — OXYCODONE HYDROCHLORIDE 10 MILLIGRAM(S): 5 TABLET ORAL at 05:57

## 2021-11-05 RX ADMIN — OXYCODONE HYDROCHLORIDE 10 MILLIGRAM(S): 5 TABLET ORAL at 22:00

## 2021-11-05 RX ADMIN — Medication 6 MILLIGRAM(S): at 21:12

## 2021-11-05 RX ADMIN — DAPTOMYCIN 132 MILLIGRAM(S): 500 INJECTION, POWDER, LYOPHILIZED, FOR SOLUTION INTRAVENOUS at 21:17

## 2021-11-05 RX ADMIN — DORZOLAMIDE HYDROCHLORIDE TIMOLOL MALEATE 1 DROP(S): 20; 5 SOLUTION/ DROPS OPHTHALMIC at 18:44

## 2021-11-05 RX ADMIN — CHLORHEXIDINE GLUCONATE 1 APPLICATION(S): 213 SOLUTION TOPICAL at 06:02

## 2021-11-05 RX ADMIN — LIDOCAINE 1 PATCH: 4 CREAM TOPICAL at 21:24

## 2021-11-05 RX ADMIN — PANTOPRAZOLE SODIUM 40 MILLIGRAM(S): 20 TABLET, DELAYED RELEASE ORAL at 05:57

## 2021-11-05 RX ADMIN — HEPARIN SODIUM 5000 UNIT(S): 5000 INJECTION INTRAVENOUS; SUBCUTANEOUS at 21:12

## 2021-11-05 RX ADMIN — OXYCODONE HYDROCHLORIDE 5 MILLIGRAM(S): 5 TABLET ORAL at 19:25

## 2021-11-05 RX ADMIN — HEPARIN SODIUM 5000 UNIT(S): 5000 INJECTION INTRAVENOUS; SUBCUTANEOUS at 05:57

## 2021-11-05 RX ADMIN — Medication 40 MILLIGRAM(S): at 06:01

## 2021-11-05 RX ADMIN — OXYCODONE HYDROCHLORIDE 10 MILLIGRAM(S): 5 TABLET ORAL at 13:55

## 2021-11-05 RX ADMIN — HEPARIN SODIUM 5000 UNIT(S): 5000 INJECTION INTRAVENOUS; SUBCUTANEOUS at 15:03

## 2021-11-05 RX ADMIN — OXYCODONE HYDROCHLORIDE 10 MILLIGRAM(S): 5 TABLET ORAL at 13:25

## 2021-11-05 RX ADMIN — AMIODARONE HYDROCHLORIDE 400 MILLIGRAM(S): 400 TABLET ORAL at 18:40

## 2021-11-05 RX ADMIN — PIPERACILLIN AND TAZOBACTAM 25 GRAM(S): 4; .5 INJECTION, POWDER, LYOPHILIZED, FOR SOLUTION INTRAVENOUS at 02:19

## 2021-11-05 RX ADMIN — Medication 1000 MILLIGRAM(S): at 06:30

## 2021-11-05 RX ADMIN — OXYCODONE HYDROCHLORIDE 5 MILLIGRAM(S): 5 TABLET ORAL at 18:55

## 2021-11-05 RX ADMIN — OXYCODONE HYDROCHLORIDE 10 MILLIGRAM(S): 5 TABLET ORAL at 21:17

## 2021-11-05 NOTE — CONSULT NOTE ADULT - CONSULT REQUESTED DATE/TIME
04-Nov-2021 11:29
26-Oct-2021 15:51
27-Oct-2021 15:04
05-Nov-2021 17:26
27-Oct-2021 09:32
01-Nov-2021 14:12
01-Nov-2021 12:00

## 2021-11-05 NOTE — CONSULT NOTE ADULT - ASSESSMENT
80 year old male (from [facility]) with PMH pancreatic cancer (dx 3/2021, chemo) s/p ERCP s/p whipple 3/2021, HTN, HLD, CHF (EF: 35%), CAD s/p stents x2 (~15 years ago), AICD (implanted 2005, extracted and re-implantation 9/2021), TAVR (4/2021), bilateral PE (7/2021) on Xarelto, spinal stenosis, macular degeneration, GERD, BPH, Left THR (x2 revisions), left femur fracture (hardware), Osteomyelitis right foot s/p right hallux amputation 9/2021, MSSA bacteremia, explantation and reimplantation of AICD 10/4/21, PICC Line RUE was on IV antibiotics, recently changed to PO.  s/p Whipple 10/27, recovered in SICU, now transferred to the floor. NSVT w/ appropriate shock from AICD on 11/1, transferred to SICU for further management. Now on floor. CT 11/3 with Loculated fluid collection adjacent to the afferent jejunal loop in the right upper quadrant.  s/p IR aspiration /drainage  Now with VRE bacteremia-high grade       A) Bacteremia   Potential sources ? PM, ?PICC ? abdominal  Less likely foot  Irrespective of source PM and line  at risk of infection  Pt also has TAVR     Rec:  1) Follow Id sensi of isolate  2) repeat blood Cx  3) remove PICC line  4) Echo -CORBIN  5) may need PM removal -please obtain EP input  6) Monitor abdominal Cx   7) Empiric Dapto 800 mg IV q 24  Monitor CPK  Continue zosyn(if fecalis ampicillin may still be active)  8) Will need long course of abx      B) Abdominal collections  ? abscess  s/p IR aspirate  follow Cx  continue zosyn,dapto as above  tailor per results      C) Foot wound  No active s/s infection  monitor clinically  abx as above    D) Pancreatic ca  s/p whipple  will defer to primary    Overall prognosis guarded  Will tailor plan for ID issues  per course,results.Will defer to primary team on management of other issues.  Assessment, plan and recommendations as detailed above were discussed with the surgical   team.  Infectious Diseases Service will cover over weekend.  Please call [phone] if issues

## 2021-11-05 NOTE — PROGRESS NOTE ADULT - ASSESSMENT
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 Patient now s/p Whipple 10/27, recovered in SICU, now transferred to the floor. NSVT w/ appropriate shock from AICD on 11/1, transferred to SICU for further management. Now on floor. CT 11/3 with Loculated fluid collection adjacent to the afferent jejunal loop in the right upper quadrant.    Plan  - IR today   - tele monitoring  - f/u fever work up  - Anesthesia following for pain  - NPO for procedure   - PT wound care for vac management   - Lasix 20mg   - DVT PPX; SQH  - ABX: Zosyn and vanco   - EP recommendations appreciated  - Cardiology recommendations appreciate    [phone]  Red Surgery

## 2021-11-05 NOTE — PROGRESS NOTE ADULT - SUBJECTIVE AND OBJECTIVE BOX
CARDIOLOGY CONSULT PROGRESS NOTE  JUANIS DE SANTIAGO  MRN-[id number]    INTERVAL EVENTS:  - called by surgery, pt w/ VRE bacteremia on BCx drawn a few nights ago for an isolated fever, now on dapto, afebrile since then  - currently feeling well, denies CP, SOB, cough, f/c, no pain around ICD    MEDICATIONS  (STANDING):  aMIOdarone    Tablet 400 milliGRAM(s) Oral every 12 hours  chlorhexidine 4% Liquid 1 Application(s) Topical <User Schedule>  DAPTOmycin IVPB 800 milliGRAM(s) IV Intermittent every 24 hours  dorzolamide 2%/timolol 0.5% Ophthalmic Solution 1 Drop(s) Both EYES two times a day  furosemide    Tablet 40 milliGRAM(s) Oral daily  heparin   Injectable 5000 Unit(s) SubCutaneous every 8 hours  influenza   Vaccine 0.5 milliLiter(s) IntraMuscular once  insulin lispro (ADMELOG) corrective regimen sliding scale   SubCutaneous three times a day before meals  insulin lispro (ADMELOG) corrective regimen sliding scale   SubCutaneous at bedtime  ketorolac 0.5% Ophthalmic Solution 1 Drop(s) Both EYES daily  latanoprost 0.005% Ophthalmic Solution 1 Drop(s) Both EYES at bedtime  lidocaine   4% Patch 1 Patch Transdermal daily  melatonin 6 milliGRAM(s) Oral at bedtime  metoprolol tartrate 25 milliGRAM(s) Oral every 12 hours  pantoprazole    Tablet 40 milliGRAM(s) Oral before breakfast  piperacillin/tazobactam IVPB.. 3.375 Gram(s) IV Intermittent every 8 hours  senna 1 Tablet(s) Oral daily    MEDICATIONS  (PRN):  diphenhydrAMINE Injectable 25 milliGRAM(s) IV Push every 4 hours PRN Pruritus  oxyCODONE    IR 5 milliGRAM(s) Oral every 3 hours PRN Moderate Pain (4 - 6)  oxyCODONE    IR 10 milliGRAM(s) Oral every 4 hours PRN Severe Pain (7 - 10)    Allergies  Dilaudid (Anaphylaxis; Hives)    P/E:  Vital Signs Last 24 Hrs  T(C): 36.5 (05 Nov 2021 12:53), Max: 36.9 (05 Nov 2021 08:01)  T(F): 97.7 (05 Nov 2021 12:53), Max: 98.5 (05 Nov 2021 08:01)  HR: 67 (05 Nov 2021 12:53) (48 - 79)  BP: 111/64 (05 Nov 2021 12:53) (98/53 - 121/71)  BP(mean): 79 (05 Nov 2021 11:00) (67 - 79)  RR: 18 (05 Nov 2021 12:53) (10 - 22)  SpO2: 98% (05 Nov 2021 12:53) (95% - 100%)  Daily Height in cm: 188 (05 Nov 2021 08:01)      I&O's Summary  04 Nov 2021 07:01  -  05 Nov 2021 07:00  --------------------------------------------------------  IN: 960 mL / OUT: 2540 mL / NET: -1580 mL    05 Nov 2021 07:01  -  05 Nov 2021 19:57  --------------------------------------------------------  IN: 120 mL / OUT: 1675 mL / NET: -1555 mL    - gen: well appearing, laying in hospital bed, NAD  - HEENT: MMM, NCAT  - heart: RRR, nml S1/S2, no RMG  - lungs: CTABL, nml WOB  - abd: soft, abd binder in place, dressing over mid-abd incision w/o exudate, no TTP over ICD, NTND, NABS  - ext: WWP, PPP, no IZZY    RELEVANT RECENT LABS/IMAGING/STUDIES:                        8.9    6.10  )-----------( 186      ( 05 Nov 2021 07:39 )             27.8     11-05    137  |  99  |  10  ----------------------------<  99  4.5   |  27  |  0.94    Ca    8.8      05 Nov 2021 07:39  Phos  4.4     11-05  Mg     1.9     11-05    TPro  6.4  /  Alb  2.9<L>  /  TBili  0.6  /  DBili  x   /  AST  19  /  ALT  21  /  AlkPhos  86  11-05    LIVER FUNCTIONS - ( 05 Nov 2021 07:39 )  Alb: 2.9 g/dL / Pro: 6.4 g/dL / ALK PHOS: 86 U/L / ALT: 21 U/L / AST: 19 U/L / GGT: x           PT/INR - ( 05 Nov 2021 07:51 )   PT: 12.5 sec;   INR: 1.04 ratio       PTT - ( 05 Nov 2021 07:51 )  PTT:31.4 sec    ABG - ( 03 Nov 2021 21:35 )  pH, Arterial: 7.48  pH, Blood: x     /  pCO2: 35    /  pO2: 75    / HCO3: 26    / Base Excess: 2.8   /  SaO2: 95.1      CARDIAC MARKERS ( 03 Nov 2021 20:55 )  x     / x     / 14 U/L / x     / <1.0 ng/mL    Culture - Blood (collected 04 Nov 2021 01:48)  Source: .Blood Blood  Gram Stain (04 Nov 2021 14:33):    Growth in aerobic and anaerobic bottles: Gram Positive Cocci in Pairs and    Chains  Preliminary Report (05 Nov 2021 12:42):    Growth in aerobic and anaerobic bottles: Enterococcus faecalis    See previous culture [id number]    Culture - Blood (collected 04 Nov 2021 00:51)  Source: .Blood Blood  Gram Stain (04 Nov 2021 13:28):    Growth in aerobic bottle: Gram Positive Cocci in Pairs and Chains    Growth in anaerobic bottle: Gram Positive Cocci in Pairs and Chains  Preliminary Report (05 Nov 2021 10:46):    Growth in aerobic and anaerobic bottles: Enterococcus faecalis    Susceptibility to follow.    ***Blood Panel PCR results on this specimen are available    approximately 3 hours after the Gram stain result.***    Gram stain, PCR, and/or culture results maynot always    correspond due to difference in methodologies.    ************************************************************    This PCR assay was performed by multiplex PCR. This    Assay tests for 66 bacterial and resistance gene targets.    Please refer to the [facility] test directory    at [email or web address] for details.  Organism: Blood Culture PCR (04 Nov 2021 15:11)  Organism: Blood Culture PCR (04 Nov 2021 15:11)

## 2021-11-05 NOTE — PROGRESS NOTE ADULT - SUBJECTIVE AND OBJECTIVE BOX
[facility] [place] [facility] - Milo Thomas, Adolfo, Binta, Brittanie, Javier Don  Office Number:  [phone]    Patient resting comfortably in bed in NAD.  Laying flat with no respiratory distress.  No complaints of chest pain, dyspnea, palpitations, PND, or orthopnea.  says he feels lousy overall    ROS: negative unless otherwise mentioned.    Telemetry:  sr, 9 beats nsvt    MEDICATIONS  (STANDING):  aMIOdarone    Tablet 400 milliGRAM(s) Oral every 12 hours  chlorhexidine 4% Liquid 1 Application(s) Topical <User Schedule>  dorzolamide 2%/timolol 0.5% Ophthalmic Solution 1 Drop(s) Both EYES two times a day  furosemide    Tablet 40 milliGRAM(s) Oral daily  heparin   Injectable 5000 Unit(s) SubCutaneous every 8 hours  influenza   Vaccine 0.5 milliLiter(s) IntraMuscular once  insulin lispro (ADMELOG) corrective regimen sliding scale   SubCutaneous three times a day before meals  insulin lispro (ADMELOG) corrective regimen sliding scale   SubCutaneous at bedtime  ketorolac 0.5% Ophthalmic Solution 1 Drop(s) Both EYES daily  latanoprost 0.005% Ophthalmic Solution 1 Drop(s) Both EYES at bedtime  lidocaine   4% Patch 1 Patch Transdermal daily  melatonin 6 milliGRAM(s) Oral at bedtime  metoprolol tartrate 25 milliGRAM(s) Oral every 12 hours  pantoprazole    Tablet 40 milliGRAM(s) Oral before breakfast  piperacillin/tazobactam IVPB.. 3.375 Gram(s) IV Intermittent every 8 hours  vancomycin  IVPB 1250 milliGRAM(s) IV Intermittent every 12 hours    MEDICATIONS  (PRN):  diphenhydrAMINE Injectable 25 milliGRAM(s) IV Push every 4 hours PRN Pruritus  oxyCODONE    IR 5 milliGRAM(s) Oral every 3 hours PRN Moderate Pain (4 - 6)  oxyCODONE    IR 10 milliGRAM(s) Oral every 4 hours PRN Severe Pain (7 - 10)      Allergies    Dilaudid (Anaphylaxis; Hives)    Intolerances        Vital Signs Last 24 Hrs  T(C): 36.8 (05 Nov 2021 08:42), Max: 36.9 (04 Nov 2021 11:18)  T(F): 98.2 (05 Nov 2021 08:42), Max: 98.5 (05 Nov 2021 08:01)  HR: 69 (05 Nov 2021 08:42) (69 - 74)  BP: 107/50 (05 Nov 2021 08:42) (103/58 - 118/63)  BP(mean): --  RR: 18 (05 Nov 2021 08:42) (18 - 18)  SpO2: 100% (05 Nov 2021 08:42) (95% - 100%)    I&O's Summary    04 Nov 2021 07:01  -  05 Nov 2021 07:00  --------------------------------------------------------  IN: 960 mL / OUT: 2540 mL / NET: -1580 mL        ON EXAM:    Constitutional: well-nourished, well-developed, NAD   HEENT:  MMM, sclerae anicteric, conjunctivae clear, no oral cyanosis.  Pulmonary: Non-labored, breath sounds are clear bilaterally, No wheezing, rales or rhonchi  Cardiovascular: Regular, S1 and S2.  No murmur.  No rubs, gallops or clicks  Gastrointestinal: Bowel Sounds present, soft, tender to palp.   Lymph: No peripheral edema.   Neurological: Alert, no focal deficits  Skin: No rashes.  Psych:  Mood & affect appropriate    LABS: All Labs Reviewed:                        8.9    6.10  )-----------( 186      ( 05 Nov 2021 07:39 )             27.8                         9.1    9.26  )-----------( 193      ( 03 Nov 2021 20:55 )             28.8                         8.3    7.25  )-----------( 205      ( 02 Nov 2021 21:48 )             26.5     05 Nov 2021 07:39    137    |  99     |  10     ----------------------------<  99     4.5     |  27     |  0.94   03 Nov 2021 20:55    132    |  95     |  11     ----------------------------<  120    4.4     |  23     |  0.86   02 Nov 2021 21:48    133    |  99     |  11     ----------------------------<  131    4.2     |  24     |  0.72     Ca    8.8        05 Nov 2021 07:39  Ca    8.4        03 Nov 2021 20:55  Ca    8.2        02 Nov 2021 21:48  Phos  4.4       05 Nov 2021 07:39  Phos  3.4       02 Nov 2021 21:48  Mg     1.9       05 Nov 2021 07:39  Mg     1.7       02 Nov 2021 21:48    TPro  6.4    /  Alb  2.9    /  TBili  0.6    /  DBili  x      /  AST  19     /  ALT  21     /  AlkPhos  86     05 Nov 2021 07:39  TPro  5.9    /  Alb  2.9    /  TBili  0.7    /  DBili  x      /  AST  61     /  ALT  44     /  AlkPhos  93     02 Nov 2021 21:48      CARDIAC MARKERS ( 03 Nov 2021 20:55 )  x     / x     / 14 U/L / x     / <1.0 ng/mL      Blood Culture: Organism --  Gram Stain Blood -- Gram Stain   Growth in aerobic and anaerobic bottles: Gram Positive Cocci in Pairs and  Chains  Specimen Source .Blood Blood  Culture-Blood --    Organism Blood Culture PCR  Gram Stain Blood -- Gram Stain   Growth in aerobic bottle: Gram Positive Cocci in Pairs and Chains  Growth in anaerobic bottle: Gram Positive Cocci in Pairs and Chains  Specimen Source .Blood Blood  Culture-Blood --        11-04 @ 11:59  TSH: 4.88

## 2021-11-05 NOTE — PRE PROCEDURE NOTE - PRE PROCEDURE EVALUATION
Interventional Radiology    HPI: 80y Male status post Whipple with intrabdominal fluid collection    Allergies: Dilaudid (Anaphylaxis; Hives)    Medications (Abx/Cardiac/Anticoagulation/Blood Products)  aMIOdarone    Tablet: 400 milliGRAM(s) Oral (11-05 @ 05:57)  furosemide    Tablet: 40 milliGRAM(s) Oral (11-05 @ 06:01)  heparin   Injectable: 5000 Unit(s) SubCutaneous (11-05 @ 05:57)  metoprolol tartrate: 25 milliGRAM(s) Oral (11-05 @ 05:57)  piperacillin/tazobactam IVPB.: 200 mL/Hr IV Intermittent (11-03 @ 21:33)  piperacillin/tazobactam IVPB..: 25 mL/Hr IV Intermittent (11-05 @ 02:19)  vancomycin  IVPB: 166.67 mL/Hr IV Intermittent (11-04 @ 18:03)    Data:  188  95.3  T(C): 36.6  HR: 72  BP: 110/62  RR: 18  SpO2: 100%    Exam  General: No acute distress  Chest: Non labored breathing  Abdomen: Non-distended  Extremities: No swelling, warm    -WBC 6.10 / HgB 8.9 / Hct 27.8 / Plt 186  -Na 132 / Cl 95 / BUN 11 / Glucose 120  -K 4.4 / CO2 23 / Cr 0.86  -ALT -- / Alk Phos -- / T.Bili --  -INR1.14    Imaging:     Plan: 80y Male presents for drainage catheter placement  -Plan for transhepatic approach due to bowel overlying anteriorly. Discussed increased bleeding risk from transhepatic approach  -Risks/Benefits/alternatives explained with the patient and/or healthcare proxy and witnessed informed consent obtained.

## 2021-11-05 NOTE — CONSULT NOTE ADULT - CONSULT REASON
VRE bacteremia
Concern allergic reaction
abdominal collection drainage s/p Whipple
s/p Whipple procedure
arrythmia, s/p shock
AICD firing
hypotension, shock, nsvt

## 2021-11-05 NOTE — PROGRESS NOTE ADULT - ASSESSMENT
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    1) HFrEF   2) VT   3) Bacteremia  4) pancreatic CA    - S/p Whipple procedure for pancreatic CA with post op VT requiring ICD shock   - Sotalol held post operatively, resumed for ~ 24 hours at the time of the VT episode. Sotalol now discontinued with Amio load started after Sotalol washout (400mg BID x1 weeks, THEN 200mg BID x1 week, THEN maintenance dose of 200mg once daily).  - Please obtain TSH. Monitor LFTs/TFTs/PFTs while on Amiodarone therapy. Patient will need outpatient ophthalmologic evaluation yearly while on Amiodarone  - On Lopressor 25mg BID, can uptitrate as tolerated   - Keep K>4, Mg>2.   - Restart Xarelto for history of PE when cleared by primary team  - c/t aBx per ID for VRE bacteremia, no current e/o device infection  - will follow    Golden Seymour MD  Cardiology Fellow - PGY 4    For all New Consults and Questions:  [email or web address]   Login: [email or web address]    *** Recommendations are preliminary until cosigned by the attending.

## 2021-11-05 NOTE — PROGRESS NOTE ADULT - SUBJECTIVE AND OBJECTIVE BOX
SURGERY DAILY PROGRESS NOTE:       SUBJECTIVE/ROS: Patient seen and evaluated on AM rounds. Pain reports abdominal pain. Awaiting procedure today.   Denies nausea, vomiting, chest pain, shortness of breath.        OBJECTIVE:    Vital Signs Last 24 Hrs  T(C): 36.8 (2021 08:42), Max: 36.9 (2021 11:18)  T(F): 98.2 (2021 08:42), Max: 98.4 (2021 11:18)  HR: 69 (2021 08:42) (69 - 74)  BP: 107/50 (2021 08:42) (103/58 - 111/62)  BP(mean): --  RR: 18 (2021 08:42) (18 - 18)  SpO2: 100% (2021 08:42) (98% - 100%)  I&O's Detail    2021 07:01  -  2021 07:00  --------------------------------------------------------  IN:    IV PiggyBack: 500 mL    IV PiggyBack: 100 mL    Oral Fluid: 360 mL  Total IN: 960 mL    OUT:    Bulb (mL): 140 mL    Voided (mL): 2400 mL  Total OUT: 2540 mL    Total NET: -1580 mL        Daily Height in cm: 187.96 (2021 20:46)    Daily   MEDICATIONS  (STANDING):  aMIOdarone    Tablet 400 milliGRAM(s) Oral every 12 hours  chlorhexidine 4% Liquid 1 Application(s) Topical <User Schedule>  dorzolamide 2%/timolol 0.5% Ophthalmic Solution 1 Drop(s) Both EYES two times a day  furosemide    Tablet 40 milliGRAM(s) Oral daily  heparin   Injectable 5000 Unit(s) SubCutaneous every 8 hours  influenza   Vaccine 0.5 milliLiter(s) IntraMuscular once  insulin lispro (ADMELOG) corrective regimen sliding scale   SubCutaneous three times a day before meals  insulin lispro (ADMELOG) corrective regimen sliding scale   SubCutaneous at bedtime  ketorolac 0.5% Ophthalmic Solution 1 Drop(s) Both EYES daily  latanoprost 0.005% Ophthalmic Solution 1 Drop(s) Both EYES at bedtime  lidocaine   4% Patch 1 Patch Transdermal daily  melatonin 6 milliGRAM(s) Oral at bedtime  metoprolol tartrate 25 milliGRAM(s) Oral every 12 hours  pantoprazole    Tablet 40 milliGRAM(s) Oral before breakfast  piperacillin/tazobactam IVPB.. 3.375 Gram(s) IV Intermittent every 8 hours  vancomycin  IVPB 1250 milliGRAM(s) IV Intermittent every 12 hours    MEDICATIONS  (PRN):  diphenhydrAMINE Injectable 25 milliGRAM(s) IV Push every 4 hours PRN Pruritus  oxyCODONE    IR 5 milliGRAM(s) Oral every 3 hours PRN Moderate Pain (4 - 6)  oxyCODONE    IR 10 milliGRAM(s) Oral every 4 hours PRN Severe Pain (7 - 10)      LABS:                        8.9    6.10  )-----------( 186      ( 2021 07:39 )             27.8     11-05    137  |  99  |  10  ----------------------------<  99  4.5   |  27  |  0.94    Ca    8.8      2021 07:39  Phos  4.4     11-05  Mg     1.9     11-05    TPro  6.4  /  Alb  2.9<L>  /  TBili  0.6  /  DBili  x   /  AST  19  /  ALT  21  /  AlkPhos  86  11-05      Urinalysis Basic - ( 2021 06:34 )    Color: Light Yellow / Appearance: Clear / S.029 / pH: x  Gluc: x / Ketone: Negative  / Bili: Negative / Urobili: Negative   Blood: x / Protein: Negative / Nitrite: Negative   Leuk Esterase: Negative / RBC: x / WBC x   Sq Epi: x / Non Sq Epi: x / Bacteria: x          PHYSICAL EXAM:  Constitutional: well developed, well nourished, NAD  Respiratory: no respiratory distress, no subcostal retractions  Cardiovascular: S1, S2  Gastrointestinal: abdomen soft, nontender, nondistended, wound vac in place on midline incision  Extremities: FROM, warm             SURGERY DAILY PROGRESS NOTE:       SUBJECTIVE/ROS: Patient seen and evaluated on AM rounds. Pain reports abdominal pain. Awaiting procedure today.   Denies nausea, vomiting, chest pain, shortness of breath.        OBJECTIVE:    Vital Signs Last 24 Hrs  T(C): 36.8 (2021 08:42), Max: 36.9 (2021 11:18)  T(F): 98.2 (2021 08:42), Max: 98.4 (2021 11:18)  HR: 69 (2021 08:42) (69 - 74)  BP: 107/50 (2021 08:42) (103/58 - 111/62)  BP(mean): --  RR: 18 (2021 08:42) (18 - 18)  SpO2: 100% (2021 08:42) (98% - 100%)  I&O's Detail    2021 07:01  -  2021 07:00  --------------------------------------------------------  IN:    IV PiggyBack: 500 mL    IV PiggyBack: 100 mL    Oral Fluid: 360 mL  Total IN: 960 mL    OUT:    Bulb (mL): 140 mL    Voided (mL): 2400 mL  Total OUT: 2540 mL    Total NET: -1580 mL        Daily Height in cm: 187.96 (2021 20:46)    Daily   MEDICATIONS  (STANDING):  aMIOdarone    Tablet 400 milliGRAM(s) Oral every 12 hours  chlorhexidine 4% Liquid 1 Application(s) Topical <User Schedule>  dorzolamide 2%/timolol 0.5% Ophthalmic Solution 1 Drop(s) Both EYES two times a day  furosemide    Tablet 40 milliGRAM(s) Oral daily  heparin   Injectable 5000 Unit(s) SubCutaneous every 8 hours  influenza   Vaccine 0.5 milliLiter(s) IntraMuscular once  insulin lispro (ADMELOG) corrective regimen sliding scale   SubCutaneous three times a day before meals  insulin lispro (ADMELOG) corrective regimen sliding scale   SubCutaneous at bedtime  ketorolac 0.5% Ophthalmic Solution 1 Drop(s) Both EYES daily  latanoprost 0.005% Ophthalmic Solution 1 Drop(s) Both EYES at bedtime  lidocaine   4% Patch 1 Patch Transdermal daily  melatonin 6 milliGRAM(s) Oral at bedtime  metoprolol tartrate 25 milliGRAM(s) Oral every 12 hours  pantoprazole    Tablet 40 milliGRAM(s) Oral before breakfast  piperacillin/tazobactam IVPB.. 3.375 Gram(s) IV Intermittent every 8 hours  vancomycin  IVPB 1250 milliGRAM(s) IV Intermittent every 12 hours    MEDICATIONS  (PRN):  diphenhydrAMINE Injectable 25 milliGRAM(s) IV Push every 4 hours PRN Pruritus  oxyCODONE    IR 5 milliGRAM(s) Oral every 3 hours PRN Moderate Pain (4 - 6)  oxyCODONE    IR 10 milliGRAM(s) Oral every 4 hours PRN Severe Pain (7 - 10)      LABS:                        8.9    6.10  )-----------( 186      ( 2021 07:39 )             27.8     11-05    137  |  99  |  10  ----------------------------<  99  4.5   |  27  |  0.94    Ca    8.8      2021 07:39  Phos  4.4     11-05  Mg     1.9     11-05    TPro  6.4  /  Alb  2.9<L>  /  TBili  0.6  /  DBili  x   /  AST  19  /  ALT  21  /  AlkPhos  86  11-05      Urinalysis Basic - ( 2021 06:34 )    Color: Light Yellow / Appearance: Clear / S.029 / pH: x  Gluc: x / Ketone: Negative  / Bili: Negative / Urobili: Negative   Blood: x / Protein: Negative / Nitrite: Negative   Leuk Esterase: Negative / RBC: x / WBC x   Sq Epi: x / Non Sq Epi: x / Bacteria: x          PHYSICAL EXAM:  Constitutional: well developed, well nourished, NAD  Respiratory: no respiratory distress, no subcostal retractions  Cardiovascular: S1, S2  Gastrointestinal: abdomen soft, nontender, nondistended, wound vac in place on midline incision, 1 MOR with ss output   Extremities: FROM, warm

## 2021-11-05 NOTE — PHARMACOTHERAPY INTERVENTION NOTE - COMMENTS
79 YO M currently on Zosyn 3.375g Q8H (11/3-current) and Vancomycin 1250mg Q12H (11/3-current) for fevers/ leukocytosis/ AMS.    SCr: 0.94, calculated CrCl 78 ml/min    11/4 BCx: VRE    Recommend to discontinue vancomycin and initiate Daptomycin for VRE bacteremia. Would recommend 8mg/kg dose and dose daptomycin 800mg IV Q24H. Please consider ID consult for this case. Monitor weekly CPK. F/u Cx to ensure TYSON susceptibility to Daptomycin.     Karolina Thomson, PharmD, [facility]  Clinical Pharmacy Specialist  [phone] or Teams 81 YO M currently on Zosyn 3.375g Q8H (11/3-current) and Vancomycin 1250mg Q12H (11/3-current) for fevers/ leukocytosis/ AMS.    SCr: 0.94, calculated CrCl 78 ml/min    11/4 BCx: VRE    Recommend to consult ID for this case.     Karolina Thomson, PharmD, [facility]  Clinical Pharmacy Specialist  [phone] or Teams 79 YO M currently on Zosyn 3.375g Q8H (11/3-current) and Vancomycin 1250mg Q12H (11/3-current) for fevers/ leukocytosis/ AMS.    SCr: 0.94, calculated CrCl 78 ml/min    11/4 BCx: VRE    Recommend to d/c Vancomycin and consult ID for this case.    Karolina Thomson, PharmD, [facility]  Clinical Pharmacy Specialist  [phone] or Teams

## 2021-11-05 NOTE — PROGRESS NOTE ADULT - ATTENDING COMMENTS
Secondary prevention ICD with recent shock for MMVT versus AT (not on telemetry at the time and not clear by intracardiac tracings). Discontinuing low dose sotalol and beginning amiodarone load. Now with VRE bacteremia without evidenced localized infection at device site and multiple alternative sources. Suggest continued antibiotic therapy with ongoing device surveillance.

## 2021-11-05 NOTE — PROGRESS NOTE ADULT - ASSESSMENT
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 He required icd extraction, and a subq icd was replaced. He is now s/p Whipple procedure    - now off of pressor support with improved BP  - 11/1 with sustained vt and subsequent icd shock, possibly in the setting of volume overload  - sotalol stopped, and now on amio load  - trend lfts  - cont metoprolol, with goal to titrate up as tolerated  - 9 beats nsvt noted overnight, cont to monitor    - known history of systolic hf (mod lv dysfunction and mod ms)  - echo 10/27 with moderate ms at HR 80, tavr in place. severe LV dysfunction  - appears more compensated from a hf perspective  - Lasix PO started  - Please continue to maintain strict I/Os, monitor daily weights, Cr, and K.   - entresto remains on hold, and will eventually restart it    - history of PE in 7/2021, and had been on xarelto.  is at elevated risk of vte noting pancreatic malignancy and relatively recent pe  - resume ac when safe from a surgical perspective. is presently on dvt ppx sq heparin    - for IR drainage of abdominal collection today. No contraindication to proceeding.  - Other cardiovascular workup will depend on clinical course.  - will follow with you

## 2021-11-05 NOTE — CONSULT NOTE ADULT - SUBJECTIVE AND OBJECTIVE BOX
Patient is a 80y old  Male who presents with a chief complaint of hf (05 Nov 2021 09:11)    From HPI" HPI:  Mr. Gatica is a 80 year old male (from [facility]) with PMH pancreatic cancer (dx 3/2021, chemo) s/p ERCP s/p whipple 3/2021, HTN, HLD, CHF (EF: 35%), CAD s/p stents x2 (~15 years ago), AICD (implanted 2005, extracted and re-implantation 9/2021), TAVR (4/2021), bilateral PE (7/2021) on Xarelto, spinal stenosis, macular degeneration, GERD, BPH, Left THR (x2 revisions), left femur fracture (hardware), Osteomyelitis right foot s/p right hallux amputation 9/2021, MSSA bacteremia, explantation and reimplantation of AICD 10/4/21, PICC Line RUE was on IV antibiotics, recently changed to PO. Pt denies n/v, abdominal pain, or changes in bowel habits. Pt evaluated by Dr. Mercer for a scheduled Whipple procedure on 10/26/21. Pt denies recent travel or sick contact.     **Covid swab on 10/23/21 at [facility]   (20 Oct 2021 16:43)  "    Above verified-agree with above unless noted below.    Hospital Course:    Patient now s/p Whipple 10/27, recovered in SICU, now transferred to the floor. NSVT w/ appropriate shock from AICD on 11/1, transferred to SICU for further management. Now on floor. CT 11/3 with Loculated fluid collection adjacent to the afferent jejunal loop in the right upper quadrant.  Now with VRE bacteremia-high grade   ID consulted     PAST MEDICAL & SURGICAL HISTORY:  HTN (hypertension)    HLD (hyperlipidemia)    GERD (gastroesophageal reflux disease)    Cardiomyopathy    MSSA bacteremia  9/2021    Acute osteomyelitis    Pulmonary embolism    Pancreas cancer  chemo    [place] (hard of hearing)  B/L hearing aids    History of total hip replacement  left X 1, 2 revisions    History of laminectomy  X3    ICD (implantable cardiac defibrillator) in place  implanted 2005, replaced 10/4/2021 [employer] Subcutaneous ICD    Benign testicular tumor  radiation    Status post amputation of toe of right foot  8/2021    Status post fracture of femur  2017 left with hardware    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    H/O Whipple procedure  2/2021        Social history:   denies   Smoking,    ETOH,      IVDU       FAMILY HISTORY:  Family history of stroke (Mother)    - Family history of medical problems in all first degree relatives reviewed.None of these were found to be related to patients current illness.    REVIEW OF SYSTEMS  General:	+ malaise fatigue or chills. Fevers absent      	  Ophthalmologic:Denies any visual complaints,discharge redness or photophobia  	  ENMT:No nasal discharge,headache,sinus congestion or throat pain.No dental complaints    Respiratory and Thorax:No cough,sputum or chest pain.Denies shortness of breath  	  Cardiovascular:	No chest pain,palpitaions or dizziness    Gastrointestinal:	NO nausea,+ abdominal pain had IR drain placed     Genitourinary:	No dysuria,frequency. No flank pain    Musculoskeletal:	No joint swelling or pain.No weakness  Foot no pain (R)    Neurological:No confusion,diziness.No extremity weakness.No bladder or bowel incontinence	        Hematology/Lymphatics:	No LN swelling.No gum bleeding     Endocrine:	No recent weight gain or loss.No abnormal heat/cold intolerance    Allergic/Immunologic:	No hives or rash   Allergies    Dilaudid (Anaphylaxis; Hives)    Intolerances        Antimicrobials:    piperacillin/tazobactam IVPB.. 3.375 Gram(s) IV Intermittent every 8 hours      Prior Antimicrobials:  MEDICATIONS  (STANDING):    piperacillin/tazobactam IVPB.   200 mL/Hr IV Intermittent (11-03-21 @ 21:33)    piperacillin/tazobactam IVPB..   25 mL/Hr IV Intermittent (11-05-21 @ 02:19)   25 mL/Hr IV Intermittent (11-04-21 @ 19:29)   25 mL/Hr IV Intermittent (11-04-21 @ 10:18)   25 mL/Hr IV Intermittent (11-04-21 @ 01:58)    vancomycin  IVPB   166.67 mL/Hr IV Intermittent (11-04-21 @ 18:03)   166.67 mL/Hr IV Intermittent (11-04-21 @ 08:49)   166.67 mL/Hr IV Intermittent (11-03-21 @ 23:48)      Other medications reviewed      Vital Signs Last 24 Hrs  T(C): 36.5 (05 Nov 2021 12:53), Max: 36.9 (05 Nov 2021 08:01)  T(F): 97.7 (05 Nov 2021 12:53), Max: 98.5 (05 Nov 2021 08:01)  HR: 67 (05 Nov 2021 12:53) (48 - 79)  BP: 111/64 (05 Nov 2021 12:53) (98/53 - 121/71)  BP(mean): 79 (05 Nov 2021 11:00) (67 - 79)  RR: 18 (05 Nov 2021 12:53) (10 - 22)  SpO2: 98% (05 Nov 2021 12:53) (95% - 100%)    PHYSICAL EXAM:Pleasant patient in no acute distress.      Constitutional:Comfortable.Awake and alert  No cachexia     Eyes:PERRL EOMI.NO discharge or conjunctival injection    ENMT:No sinus tenderness.No thrush.No pharyngeal exudate or erythema.Fair dental hygiene    Neck:Supple,No LN,no JVD      Respiratory:Good air entry bilaterally,CTA    Cardiovascular:S1 S2 wnl, ESM no ,rub or gallops  L SC PM no erythema or tenderness    Gastrointestinal:Soft BS(+) minimal  tenderness no masses ,No rebound or guarding  Midline incision wound vac  JPs and drains       Genitourinary:No CVA tendereness         Extremities:R foot 1st toe resection-wound with packing - no purulence or discharge     Vascular:peripheral pulses felt    Neurological:AAO X 3,No grossly focal deficits          Musculoskeletal:No joint swelling or LOM              Labs:                            8.9    6.10  )-----------( 186      ( 05 Nov 2021 07:39 )             27.8     WBC Count: 6.10 (11-05-21 @ 07:39)  WBC Count: 9.26 (11-03-21 @ 20:55)  WBC Count: 7.25 (11-02-21 @ 21:48)  WBC Count: 5.89 (11-01-21 @ 21:35)  WBC Count: 6.78 (11-01-21 @ 11:29)  WBC Count: 5.48 (11-01-21 @ 07:18)  WBC Count: 5.82 (10-31-21 @ 09:55)  WBC Count: 5.53 (10-30-21 @ 22:28)  WBC Count: 5.82 (10-30-21 @ 14:56)  WBC Count: 4.73 (10-30-21 @ 01:32)      11-05    137  |  99  |  10  ----------------------------<  99  4.5   |  27  |  0.94    Ca    8.8      05 Nov 2021 07:39  Phos  4.4     11-05  Mg     1.9     11-05    TPro  6.4  /  Alb  2.9<L>  /  TBili  0.6  /  DBili  x   /  AST  19  /  ALT  21  /  AlkPhos  86  11-05            Culture - Blood (collected 04 Nov 2021 01:48)  Source: .Blood Blood  Gram Stain (04 Nov 2021 14:33):    Growth in aerobic and anaerobic bottles: Gram Positive Cocci in Pairs and  < from: CT Abdomen and Pelvis w/ IV Cont (11.04.21 @ 01:12) >  IMPRESSION:  Postop changes subsequent to Whipple procedure.    Loculated fluid collection adjacent to the afferent jejunal loop in the right upper quadrant.    < end of copied text >    Chains  Preliminary Report (05 Nov 2021 12:42):    Growth in aerobic and anaerobic bottles: Enterococcus faecalis    See previous culture [id number]    Culture - Blood (collected 04 Nov 2021 00:51)  Source: .Blood Blood  Gram Stain (04 Nov 2021 13:28):    Growth in aerobic bottle: Gram Positive Cocci in Pairs and Chains    Growth in anaerobic bottle: Gram Positive Cocci in Pairs and Chains  Preliminary Report (05 Nov 2021 10:46):    Growth in aerobic and anaerobic bottles: Enterococcus faecalis    Susceptibility to follow.    ***Blood Panel PCR results on this specimen are available    approximately 3 hours after the Gram stain result.***    Gram stain, PCR, and/or culture results maynot always    correspond due to difference in methodologies.    ************************************************************    This PCR assay was performed by multiplex PCR. This    Assay tests for 66 bacterial and resistance gene targets.    Please refer to the [facility] test directory    at [email or web address] for details.  Organism: Blood Culture PCR (04 Nov 2021 15:11)  Organism: Blood Culture PCR (04 Nov 2021 15:11)      -  Enterococcus faecalis,VRE: Detec      Method Type: PCR        Vancomycin Level, Trough: 12.6 ug/mL (11-05-21 @ 11:46)    < from: CT Abdomen and Pelvis w/ IV Cont (11.04.21 @ 01:12) >  IMPRESSION:  Postop changes subsequent to Whipple procedure.    Loculated fluid collection adjacent to the afferent jejunal loop in the right upper quadrant.    < end of copied text >    < from: CT Chest w/ IV Cont (11.04.21 @ 01:12) >  IMPRESSION:  Postop changes subsequent to Whipple procedure.    Loculated fluid collection adjacent to the afferent jejunal loop in the right upper quadrant.    < end of copied text >    < from: CT Head No Cont (11.04.21 @ 00:34) >    IMPRESSION:    No hydrocephalus, acute intracranial hemorrhage, mass effect, or brain edema.      --- End of Report ---          < end of copied text >      rinalysis (11.04.21 @ 06:34)   pH Urine: 7.5   Glucose Qualitative, Urine: Negative   Blood, Urine: Negative   Color: Light Yellow   Urine Appearance: Clear   Bilirubin: Negative   Ketone - Urine: Negative   Specific Gravity: 1.029   Protein, Urine: Negative   Urobilinogen: Negative   Nitrite: Negative   Leukocyte Esterase Concentration: Negative   Imaging studies(films) were independently reviewed.Findings as detailed in report above

## 2021-11-06 LAB
-  AMPICILLIN: SIGNIFICANT CHANGE UP
-  GENTAMICIN SYNERGY: SIGNIFICANT CHANGE UP
-  STREPTOMYCIN SYNERGY: SIGNIFICANT CHANGE UP
-  VANCOMYCIN: SIGNIFICANT CHANGE UP
ALBUMIN SERPL ELPH-MCNC: 2.9 G/DL — LOW (ref 3.3–5)
ALP SERPL-CCNC: 89 U/L — SIGNIFICANT CHANGE UP (ref 40–120)
ALT FLD-CCNC: 20 U/L — SIGNIFICANT CHANGE UP (ref 10–45)
ANION GAP SERPL CALC-SCNC: 12 MMOL/L — SIGNIFICANT CHANGE UP (ref 5–17)
AST SERPL-CCNC: 20 U/L — SIGNIFICANT CHANGE UP (ref 10–40)
BILIRUB SERPL-MCNC: 0.6 MG/DL — SIGNIFICANT CHANGE UP (ref 0.2–1.2)
BUN SERPL-MCNC: 8 MG/DL — SIGNIFICANT CHANGE UP (ref 7–23)
CALCIUM SERPL-MCNC: 8.8 MG/DL — SIGNIFICANT CHANGE UP (ref 8.4–10.5)
CHLORIDE SERPL-SCNC: 100 MMOL/L — SIGNIFICANT CHANGE UP (ref 96–108)
CO2 SERPL-SCNC: 25 MMOL/L — SIGNIFICANT CHANGE UP (ref 22–31)
CREAT SERPL-MCNC: 0.85 MG/DL — SIGNIFICANT CHANGE UP (ref 0.5–1.3)
CULTURE RESULTS: SIGNIFICANT CHANGE UP
CULTURE RESULTS: SIGNIFICANT CHANGE UP
GLUCOSE BLDC GLUCOMTR-MCNC: 111 MG/DL — HIGH (ref 70–99)
GLUCOSE BLDC GLUCOMTR-MCNC: 112 MG/DL — HIGH (ref 70–99)
GLUCOSE BLDC GLUCOMTR-MCNC: 121 MG/DL — HIGH (ref 70–99)
GLUCOSE BLDC GLUCOMTR-MCNC: 90 MG/DL — SIGNIFICANT CHANGE UP (ref 70–99)
GLUCOSE SERPL-MCNC: 100 MG/DL — HIGH (ref 70–99)
HCT VFR BLD CALC: 28.3 % — LOW (ref 39–50)
HGB BLD-MCNC: 9.2 G/DL — LOW (ref 13–17)
MAGNESIUM SERPL-MCNC: 1.8 MG/DL — SIGNIFICANT CHANGE UP (ref 1.6–2.6)
MCHC RBC-ENTMCNC: 31.6 PG — SIGNIFICANT CHANGE UP (ref 27–34)
MCHC RBC-ENTMCNC: 32.5 GM/DL — SIGNIFICANT CHANGE UP (ref 32–36)
MCV RBC AUTO: 97.3 FL — SIGNIFICANT CHANGE UP (ref 80–100)
METHOD TYPE: SIGNIFICANT CHANGE UP
NRBC # BLD: 0 /100 WBCS — SIGNIFICANT CHANGE UP (ref 0–0)
ORGANISM # SPEC MICROSCOPIC CNT: SIGNIFICANT CHANGE UP
PHOSPHATE SERPL-MCNC: 3.7 MG/DL — SIGNIFICANT CHANGE UP (ref 2.5–4.5)
PLATELET # BLD AUTO: 227 K/UL — SIGNIFICANT CHANGE UP (ref 150–400)
POTASSIUM SERPL-MCNC: 4.1 MMOL/L — SIGNIFICANT CHANGE UP (ref 3.5–5.3)
POTASSIUM SERPL-SCNC: 4.1 MMOL/L — SIGNIFICANT CHANGE UP (ref 3.5–5.3)
PROT SERPL-MCNC: 6.7 G/DL — SIGNIFICANT CHANGE UP (ref 6–8.3)
RBC # BLD: 2.91 M/UL — LOW (ref 4.2–5.8)
RBC # FLD: 14.6 % — HIGH (ref 10.3–14.5)
SODIUM SERPL-SCNC: 137 MMOL/L — SIGNIFICANT CHANGE UP (ref 135–145)
SPECIMEN SOURCE: SIGNIFICANT CHANGE UP
SPECIMEN SOURCE: SIGNIFICANT CHANGE UP
WBC # BLD: 6.79 K/UL — SIGNIFICANT CHANGE UP (ref 3.8–10.5)
WBC # FLD AUTO: 6.79 K/UL — SIGNIFICANT CHANGE UP (ref 3.8–10.5)

## 2021-11-06 PROCEDURE — 99232 SBSQ HOSP IP/OBS MODERATE 35: CPT

## 2021-11-06 RX ORDER — ALPRAZOLAM 0.25 MG
1 TABLET ORAL EVERY 6 HOURS
Refills: 0 | Status: DISCONTINUED | OUTPATIENT
Start: 2021-11-06 | End: 2021-11-06

## 2021-11-06 RX ORDER — ALPRAZOLAM 0.25 MG
0.25 TABLET ORAL AT BEDTIME
Refills: 0 | Status: DISCONTINUED | OUTPATIENT
Start: 2021-11-06 | End: 2021-11-13

## 2021-11-06 RX ADMIN — DORZOLAMIDE HYDROCHLORIDE TIMOLOL MALEATE 1 DROP(S): 20; 5 SOLUTION/ DROPS OPHTHALMIC at 20:00

## 2021-11-06 RX ADMIN — Medication 1 DROP(S): at 18:41

## 2021-11-06 RX ADMIN — Medication 6 MILLIGRAM(S): at 21:09

## 2021-11-06 RX ADMIN — LATANOPROST 1 DROP(S): 0.05 SOLUTION/ DROPS OPHTHALMIC; TOPICAL at 21:09

## 2021-11-06 RX ADMIN — HEPARIN SODIUM 5000 UNIT(S): 5000 INJECTION INTRAVENOUS; SUBCUTANEOUS at 21:08

## 2021-11-06 RX ADMIN — CHLORHEXIDINE GLUCONATE 1 APPLICATION(S): 213 SOLUTION TOPICAL at 06:09

## 2021-11-06 RX ADMIN — HEPARIN SODIUM 5000 UNIT(S): 5000 INJECTION INTRAVENOUS; SUBCUTANEOUS at 13:11

## 2021-11-06 RX ADMIN — PIPERACILLIN AND TAZOBACTAM 25 GRAM(S): 4; .5 INJECTION, POWDER, LYOPHILIZED, FOR SOLUTION INTRAVENOUS at 10:09

## 2021-11-06 RX ADMIN — Medication 40 MILLIGRAM(S): at 05:54

## 2021-11-06 RX ADMIN — HEPARIN SODIUM 5000 UNIT(S): 5000 INJECTION INTRAVENOUS; SUBCUTANEOUS at 05:53

## 2021-11-06 RX ADMIN — PIPERACILLIN AND TAZOBACTAM 25 GRAM(S): 4; .5 INJECTION, POWDER, LYOPHILIZED, FOR SOLUTION INTRAVENOUS at 18:30

## 2021-11-06 RX ADMIN — AMIODARONE HYDROCHLORIDE 400 MILLIGRAM(S): 400 TABLET ORAL at 18:38

## 2021-11-06 RX ADMIN — DORZOLAMIDE HYDROCHLORIDE TIMOLOL MALEATE 1 DROP(S): 20; 5 SOLUTION/ DROPS OPHTHALMIC at 05:53

## 2021-11-06 RX ADMIN — OXYCODONE HYDROCHLORIDE 5 MILLIGRAM(S): 5 TABLET ORAL at 03:00

## 2021-11-06 RX ADMIN — OXYCODONE HYDROCHLORIDE 10 MILLIGRAM(S): 5 TABLET ORAL at 05:30

## 2021-11-06 RX ADMIN — DAPTOMYCIN 132 MILLIGRAM(S): 500 INJECTION, POWDER, LYOPHILIZED, FOR SOLUTION INTRAVENOUS at 21:09

## 2021-11-06 RX ADMIN — OXYCODONE HYDROCHLORIDE 5 MILLIGRAM(S): 5 TABLET ORAL at 02:01

## 2021-11-06 RX ADMIN — Medication 25 MILLIGRAM(S): at 05:54

## 2021-11-06 RX ADMIN — LIDOCAINE 1 PATCH: 4 CREAM TOPICAL at 06:09

## 2021-11-06 RX ADMIN — PANTOPRAZOLE SODIUM 40 MILLIGRAM(S): 20 TABLET, DELAYED RELEASE ORAL at 05:54

## 2021-11-06 RX ADMIN — OXYCODONE HYDROCHLORIDE 10 MILLIGRAM(S): 5 TABLET ORAL at 21:00

## 2021-11-06 RX ADMIN — OXYCODONE HYDROCHLORIDE 10 MILLIGRAM(S): 5 TABLET ORAL at 04:37

## 2021-11-06 RX ADMIN — LIDOCAINE 1 PATCH: 4 CREAM TOPICAL at 09:20

## 2021-11-06 RX ADMIN — OXYCODONE HYDROCHLORIDE 10 MILLIGRAM(S): 5 TABLET ORAL at 20:29

## 2021-11-06 RX ADMIN — Medication 25 MILLIGRAM(S): at 18:42

## 2021-11-06 RX ADMIN — PIPERACILLIN AND TAZOBACTAM 25 GRAM(S): 4; .5 INJECTION, POWDER, LYOPHILIZED, FOR SOLUTION INTRAVENOUS at 00:30

## 2021-11-06 RX ADMIN — AMIODARONE HYDROCHLORIDE 400 MILLIGRAM(S): 400 TABLET ORAL at 05:54

## 2021-11-06 NOTE — PROGRESS NOTE ADULT - SUBJECTIVE AND OBJECTIVE BOX
Patient is a 80y old  Male who presents with a chief complaint of hf (05 Nov 2021 09:11)    f/u +BC    Interval History/ROS:  Post-op 10/27 s/p Whipple.  Denies fever.  No myalgias on dapto.  no n/v/d.  Remainder of ROS otherwise negative.    PAST MEDICAL & SURGICAL HISTORY:  HTN (hypertension)  HLD (hyperlipidemia)  GERD (gastroesophageal reflux disease)  Cardiomyopathy  MSSA bacteremia 9/2021  Acute osteomyelitis  Pulmonary embolism  Pancreas cancer on chemo  [place] (hard of hearing), B/L hearing aids  History of total hip replacement, left X 1, 2 revisions  History of laminectomy x3  ICD (implantable cardiac defibrillator) in place, implanted 2005, replaced 10/4/2021 [employer] Subcutaneous ICD  Benign testicular tumor s/p radiation  Status post amputation of toe of right foot 8/2021  Status post fracture of femur, 2017 left with hardware  S/P TAVR (transcatheter aortic valve replacement) 7/2021  H/O Whipple procedure 2/2021    Allergies  Dilaudid (Anaphylaxis; Hives)    Antimicrobials:  piperacillin/tazobactam IVPB.. 3.375 Gram(s) IV Intermittent every 8 hours (11/3-)    MEDICATIONS  (STANDING):  aMIOdarone    Tablet 400 every 12 hours  furosemide    Tablet 40 daily  heparin   Injectable 5000 every 8 hours  influenza   Vaccine 0.5 once  insulin lispro (ADMELOG) corrective regimen sliding scale  three times a day before meals  insulin lispro (ADMELOG) corrective regimen sliding scale  at bedtime  melatonin 6 at bedtime  metoprolol tartrate 25 every 12 hours  pantoprazole    Tablet 40 before breakfast  senna 1 daily    Vital Signs Last 24 Hrs  T(F): 97.7 (11-06-21 @ 05:34), Max: 97.7 (11-06-21 @ 05:34)  HR: 78 (11-06-21 @ 05:34)  BP: 118/69 (11-06-21 @ 05:34)  RR: 18 (11-06-21 @ 05:34)  SpO2: 99% (11-06-21 @ 05:34) (99% - 99%)  Wt(kg): --    PHYSICAL EXAM:  Constitutional: non-toxic  HEAD/EYES: anicteric  ENT:  supple  Cardiovascular:   normal S1, S2, murmur, L axillary defibrillator  Respiratory:  clear BS bilaterally, no wheezes, no rales  GI:  soft, incision with VAC dressing, non-tender, normal bowel sounds, drain  :  no hernandez  Musculoskeletal:  no synovitis  Neurologic: awake and alert, normal strength, no focal findings  Skin:  no rash, no erythema, no phlebitis   Psychiatric:  awake, alert, appropriate mood                        9.2    6.79  )-----------( 227      ( 06 Nov 2021 05:41 )             28.3 11-06    137  |  100  |  8   ----------------------------<  100  4.1   |  25  |  0.85  Ca    8.8      06 Nov 2021 05:41Phos  3.7     11-06Mg     1.8     11-06  TPro  6.7  /  Alb  2.9  /  TBili  0.6  /  DBili  x   /  AST  20  /  ALT  20  /  AlkPhos  89  11-06    MICROBIOLOGY:  Culture - Body Fluid with Gram Stain (collected 11-05-21 @ 13:01)  Source: .Body Fluid Peritoneal Fluid  Gram Stain (11-05-21 @ 20:31):    polymorphonuclear leukocytes seen    No organisms seen    by cytocentrifuge  Preliminary Report (11-06-21 @ 12:30):    No growth to date.    Culture - Blood (collected 11-04-21 @ 01:48)  Source: .Blood Blood  Gram Stain (11-04-21 @ 14:33):    Growth in aerobic and anaerobic bottles: Gram Positive Cocci in Pairs and    Chains  Final Report (11-06-21 @ 12:38):    Growth in aerobic and anaerobic bottles: Enterococcus faecalis    See previous culture [id number]    Culture - Blood (collected 11-04-21 @ 00:51)  Source: .Blood Blood  Gram Stain (11-04-21 @ 13:28):    Growth in aerobic bottle: Gram Positive Cocci in Pairs and Chains    Growth in anaerobic bottle: Gram Positive Cocci in Pairs and Chains  Final Report (11-06-21 @ 12:40):    Growth in aerobic and anaerobic bottles: Enterococcus faecalis  Organism: Blood Culture PCR  Enterococcus faecalis (11-06-21 @ 12:40)  Organism: Enterococcus faecalis (11-06-21 @ 12:40)      -  Ampicillin: S <=2 Predicts results to ampicillin/sulbactam, amoxacillin-clavulanate and  piperacillin-tazobactam.      -  Gentamicin synergy: R      -  Streptomycin synergy: R      -  Vancomycin: S 2      Method Type: TYSON  Organism: Blood Culture PCR (11-06-21 @ 12:40)      -  Enterococcus faecalis,VRE: Detec      Method Type: PCR    RADIOLOGY:  below radiology personally reviewed and agree with finding    CT Abdomen and Pelvis w/ IV Cont (11.04.21 @ 01:12) >  IMPRESSION:  Postop changes subsequent to Whipple procedure.  Loculated fluid collection adjacent to the afferent jejunal loop in the right upper quadrant.      ECHO:  Transthoracic Echocardiogram (10.27.21 @ 07:59) >  Conclusions:  1. Mitral annular calcification and calcified mitral leaflets with decreased diastolic opening. Mean transmitral valve gradient equals 7 mm Hg, consistent with moderate mitral stenosis. HR 80s  2. Transcatheter aortic valve replacement. Peaktransaortic valve gradient equals 12 mm Hg, mean transaortic valve gradient equals 7 mm Hg, which is probably normal in the presence of a transcatheter aortic valve replacement. No aortic valve regurgitation seen.  3. Endocardial visualization enhanced with intravenous injection of Ultrasonic Enhancing Agent (Definity). Severe global left ventricular systolic dysfunction.  4. The right ventricle is not well visualized; grossly normal right ventricular systolic function.

## 2021-11-06 NOTE — PROGRESS NOTE ADULT - ASSESSMENT
80M (from [facility]) with PMH pancreatic cancer (dx 3/2021, chemo) s/p ERCP s/p whipple 3/2021, HTN, HLD, CHF (EF: 35%), CAD s/p stents x2 (~15 years ago), AICD (implanted 2005, extracted and re-implantation 9/2021), TAVR (4/2021), bilateral PE (7/2021) on Xarelto, spinal stenosis, macular degeneration, GERD, BPH, Left THR (x2 revisions), left femur fracture (hardware), Osteomyelitis right foot s/p right hallux amputation 9/2021, MSSA bacteremia, explantation and reimplantation of AICD 10/4/21, PICC Line RUE was on IV antibiotics, recently changed to PO.  Now, he is s/p Whipple 10/27, recovered in SICU, now transferred to the floor. NSVT w/ appropriate shock from AICD on 11/1, transferred to SICU for further management. Now on floor. CT 11/3 with Loculated fluid collection adjacent to the afferent jejunal loop in the right upper quadrant.  s/p IR aspiration /drainage.  Now with VRE bacteremia-high grade     VRE Bacteremia   - source not clear:  PICC, defibrillator, TAVR, abdominal?  - continue daptomycin for now  - will need CORBIN  - monitor CPK weekly  - f/u repeat BC  - would remove PICC  - f/u sensitivities    Abdominal collections  - s/p aspiraiton  - f/u culture  - can continue zosyn with daptomycin for now    Foot wound  - No active s/s infection  - monitor clinically    Pancreatic ca  - s/p whipple  - otherwise, per primary team    Please call the ID service [phone] with questions or concerns over the weekend

## 2021-11-06 NOTE — PROGRESS NOTE ADULT - ASSESSMENT
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 Patient now s/p Whipple 10/27, recovered in SICU, now transferred to the floor. NSVT w/ appropriate shock from AICD on 11/1, transferred to SICU for further management. Now on floor. CT 11/3 with Loculated fluid collection adjacent to the afferent jejunal loop in the right upper quadrant.    Plan    - s/p IR drainage  - tele monitoring  - f/u fever work up  - Anesthesia following for pain  - NPO for procedure   - PT wound care for vac management   - Lasix 20mg   - DVT PPX; SQH  - ABX: Zosyn and vanco   - EP recommendations appreciated  - Cardiology recommendations appreciate  - Remove PICC line  - Midline cath placement  - f/u cultures  - trend LFTs  - TSH    [phone]  [facility]

## 2021-11-06 NOTE — PROGRESS NOTE ADULT - ASSESSMENT
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 He required icd extraction, and a subq icd was replaced. He is now s/p Whipple procedure    - now off of pressor support with improved BP  - 11/1 with sustained vt and subsequent icd shock, possibly in the setting of volume overload  - sotalol stopped, and now on amio load  - trend lfts  - cont metoprolol, with goal to titrate up as tolerated  - 9 beats nsvt cont to monitor    - known history of systolic hf (mod lv dysfunction and mod ms)  - echo 10/27 with moderate ms at HR 80, tavr in place. severe LV dysfunction  - appears more compensated from a hf perspective  - Lasix PO started  - Please continue to maintain strict I/Os, monitor daily weights, Cr, and K.   - entresto remains on hold, and will eventually restart it    - history of PE in 7/2021, and had been on xarelto.  is at elevated risk of vte noting pancreatic malignancy and relatively recent pe  - resume ac when safe from a surgical perspective. is presently on dvt ppx sq heparin    - Other cardiovascular workup will depend on clinical course.  - will follow with you

## 2021-11-06 NOTE — PROGRESS NOTE ADULT - SUBJECTIVE AND OBJECTIVE BOX
24h Events:  No acute events overnight.    Subjective:   Patient seen at bedside this AM. Denies n/v. Tolerating Diet     Objective:  Vital Signs  T(C): 36.5 (11-05 @ 12:53), Max: 36.9 (11-05 @ 08:01)  HR: 67 (11-05 @ 12:53) (48 - 79)  BP: 111/64 (11-05 @ 12:53) (98/53 - 121/71)  RR: 18 (11-05 @ 12:53) (10 - 22)  SpO2: 98% (11-05 @ 12:53) (95% - 100%)  11-04-21 @ 07:01  -  11-05-21 @ 07:00  --------------------------------------------------------  IN:  Total IN: 0 mL    OUT:    Bulb (mL): 140 mL    Voided (mL): 2400 mL  Total OUT: 2540 mL    Total NET: -2540 mL      11-05-21 @ 07:01  -  11-06-21 @ 04:42  --------------------------------------------------------  IN:  Total IN: 0 mL    OUT:    Bulb (mL): 70 mL    Drain (mL): 30 mL    Voided (mL): 1975 mL  Total OUT: 2075 mL    Total NET: -2075 mL          Physical Exam:  GEN:   RESP:   ABD:   EXTR:   NEURO:     Labs:                        8.9    6.10  )-----------( 186      ( 05 Nov 2021 07:39 )             27.8   11-05    137  |  99  |  10  ----------------------------<  99  4.5   |  27  |  0.94    Ca    8.8      05 Nov 2021 07:39  Phos  4.4     11-05  Mg     1.9     11-05    TPro  6.4  /  Alb  2.9<L>  /  TBili  0.6  /  DBili  x   /  AST  19  /  ALT  21  /  AlkPhos  86  11-05    CAPILLARY BLOOD GLUCOSE      POCT Blood Glucose.: 122 mg/dL (05 Nov 2021 20:21)  POCT Blood Glucose.: 144 mg/dL (05 Nov 2021 16:59)  POCT Blood Glucose.: 138 mg/dL (05 Nov 2021 12:38)  POCT Blood Glucose.: 97 mg/dL (05 Nov 2021 07:56)      Imaging:     24h Events:  No acute events overnight.    Subjective:   Patient seen at bedside this AM. Reports anxiety, mild abdominal pain. Denies n/v, SOB, chest pain or palpitations.      Objective:  Vital Signs  T(C): 36.5 (11-05 @ 12:53), Max: 36.9 (11-05 @ 08:01)  HR: 67 (11-05 @ 12:53) (48 - 79)  BP: 111/64 (11-05 @ 12:53) (98/53 - 121/71)  RR: 18 (11-05 @ 12:53) (10 - 22)  SpO2: 98% (11-05 @ 12:53) (95% - 100%)  11-04-21 @ 07:01  -  11-05-21 @ 07:00  --------------------------------------------------------  IN:  Total IN: 0 mL    OUT:    Bulb (mL): 140 mL    Voided (mL): 2400 mL  Total OUT: 2540 mL    Total NET: -2540 mL      11-05-21 @ 07:01  -  11-06-21 @ 04:42  --------------------------------------------------------  IN:  Total IN: 0 mL    OUT:    Bulb (mL): 70 mL    Drain (mL): 30 mL    Voided (mL): 1975 mL  Total OUT: 2075 mL    Total NET: -2075 mL          Physical Exam:  GEN:   RESP:   ABD:   EXTR:   NEURO:     Labs:                        8.9    6.10  )-----------( 186      ( 05 Nov 2021 07:39 )             27.8   11-05    137  |  99  |  10  ----------------------------<  99  4.5   |  27  |  0.94    Ca    8.8      05 Nov 2021 07:39  Phos  4.4     11-05  Mg     1.9     11-05    TPro  6.4  /  Alb  2.9<L>  /  TBili  0.6  /  DBili  x   /  AST  19  /  ALT  21  /  AlkPhos  86  11-05    CAPILLARY BLOOD GLUCOSE      POCT Blood Glucose.: 122 mg/dL (05 Nov 2021 20:21)  POCT Blood Glucose.: 144 mg/dL (05 Nov 2021 16:59)  POCT Blood Glucose.: 138 mg/dL (05 Nov 2021 12:38)  POCT Blood Glucose.: 97 mg/dL (05 Nov 2021 07:56)      Imaging:

## 2021-11-06 NOTE — PROGRESS NOTE ADULT - SUBJECTIVE AND OBJECTIVE BOX
Follow up:  CAD    HPI:  Mr. De Santiago is a 80 year old male (from [facility]) with PMH pancreatic cancer (dx 3/2021, chemo) s/p ERCP s/p whipple 3/2021, HTN, HLD, CHF (EF: 35%), CAD s/p stents x2 (~15 years ago), AICD (implanted 2005, extracted and re-implantation 9/2021), TAVR (4/2021), bilateral PE (7/2021) on Xarelto, spinal stenosis, macular degeneration, GERD, BPH, Left THR (x2 revisions), left femur fracture (hardware), Osteomyelitis right foot s/p right hallux amputation 9/2021, MSSA bacteremia, explantation and reimplantation of AICD 10/4/21, PICC Line RUE was on IV antibiotics, recently changed to PO. Pt denies n/v, abdominal pain, or changes in bowel habits. Pt evaluated by Dr. Mercer for a scheduled Whipple procedure on 10/26/21. Pt denies recent travel or sick contact.     **Covid swab on 10/23/21 at [facility]   (20 Oct 2021 16:43)      PAST MEDICAL & SURGICAL HISTORY:  HTN (hypertension)    HLD (hyperlipidemia)    GERD (gastroesophageal reflux disease)    Cardiomyopathy    MSSA bacteremia  9/2021    Acute osteomyelitis    Pulmonary embolism    Pancreas cancer  chemo    [place] (hard of hearing)  B/L hearing aids    History of total hip replacement  left X 1, 2 revisions    History of laminectomy  X3    ICD (implantable cardiac defibrillator) in place  implanted 2005, replaced 10/4/2021 [employer] Subcutaneous ICD    Benign testicular tumor  radiation    Status post amputation of toe of right foot  8/2021    Status post fracture of femur  2017 left with hardware    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    H/O Whipple procedure  2/2021        MEDICATIONS  (STANDING):  aMIOdarone    Tablet 400 milliGRAM(s) Oral every 12 hours  chlorhexidine 4% Liquid 1 Application(s) Topical <User Schedule>  DAPTOmycin IVPB 800 milliGRAM(s) IV Intermittent every 24 hours  dorzolamide 2%/timolol 0.5% Ophthalmic Solution 1 Drop(s) Both EYES two times a day  furosemide    Tablet 40 milliGRAM(s) Oral daily  heparin   Injectable 5000 Unit(s) SubCutaneous every 8 hours  influenza   Vaccine 0.5 milliLiter(s) IntraMuscular once  insulin lispro (ADMELOG) corrective regimen sliding scale   SubCutaneous three times a day before meals  insulin lispro (ADMELOG) corrective regimen sliding scale   SubCutaneous at bedtime  ketorolac 0.5% Ophthalmic Solution 1 Drop(s) Both EYES daily  latanoprost 0.005% Ophthalmic Solution 1 Drop(s) Both EYES at bedtime  lidocaine   4% Patch 1 Patch Transdermal daily  melatonin 6 milliGRAM(s) Oral at bedtime  metoprolol tartrate 25 milliGRAM(s) Oral every 12 hours  pantoprazole    Tablet 40 milliGRAM(s) Oral before breakfast  piperacillin/tazobactam IVPB.. 3.375 Gram(s) IV Intermittent every 8 hours  senna 1 Tablet(s) Oral daily    MEDICATIONS  (PRN):  diphenhydrAMINE Injectable 25 milliGRAM(s) IV Push every 4 hours PRN Pruritus  oxyCODONE    IR 10 milliGRAM(s) Oral every 4 hours PRN Severe Pain (7 - 10)  oxyCODONE    IR 5 milliGRAM(s) Oral every 3 hours PRN Moderate Pain (4 - 6)      Vital Signs Last 24 Hrs  T(C): 36.5 (06 Nov 2021 05:34), Max: 36.5 (05 Nov 2021 11:40)  T(F): 97.7 (06 Nov 2021 05:34), Max: 97.7 (05 Nov 2021 11:40)  HR: 78 (06 Nov 2021 05:34) (66 - 78)  BP: 118/69 (06 Nov 2021 05:34) (111/64 - 121/71)  BP(mean): --  RR: 18 (06 Nov 2021 05:34) (18 - 20)  SpO2: 99% (06 Nov 2021 05:34) (98% - 99%)    I&O's Summary    05 Nov 2021 07:01  -  06 Nov 2021 07:00  --------------------------------------------------------  IN: 120 mL / OUT: 3075 mL / NET: -2955 mL        PHYSICAL EXAM:    Constitutional: NAD, awake and alert, well-developed  Eyes:  EOMI,  Pupils round, no lesions  ENMT: no exudate or erythema  Pulmonary: Non-labored, breath sounds are clear bilaterally, No wheezing, rales or rhonchi  Cardiovascular: PMI not palpable RRR normal S1 and S2, no murmurs, rubs, gallops or clicks  Gastrointestinal: Bowel Sounds present, soft, nontender.   Lymph: No cervical lymphadenopathy.  Neurological: Alert, no focal deficits  Skin: No rashes.  No cyanosis.  Psych:  Mood & affect appropriate   Ext: No lower ext edema                                9.2    6.79  )-----------( 227      ( 06 Nov 2021 05:41 )             28.3     11-06    137  |  100  |  8   ----------------------------<  100<H>  4.1   |  25  |  0.85    Ca    8.8      06 Nov 2021 05:41  Phos  3.7     11-06  Mg     1.8     11-06    TPro  6.7  /  Alb  2.9<L>  /  TBili  0.6  /  DBili  x   /  AST  20  /  ALT  20  /  AlkPhos  89  11-06    < from: 12 Lead ECG (11.03.21 @ 19:52) >    Ventricular Rate 103 BPM    Atrial Rate 133 BPM    QRS Duration 124 ms    Q-T Interval 310 ms    QTC Calculation(Bazett) 406 ms    R Axis 40 degrees    T Axis 118 degrees    Diagnosis Line LOW VOLTAGE IN THE LIMB LEADS  NORMAL SINUS RHYTHM WITH PREMATURE ATRIAL COMPLEXES  LEFT BUNDLE BRANCH BLOCK  ABNORMAL ECG  WHEN COMPARED WITH ECG OF 01-NOV-2021 11:14, (UNCONFIRMED)  NO SIGNIFICANT CHANGE WAS FOUND    Confirmed by MD Che, Mayo ([id number]) on 11/5/2021 2:49:53 PM    < end of copied text >    < from: Xray Chest 1 View- PORTABLE-Routine (Xray Chest 1 View- PORTABLE-Routine in AM.) (11.04.21 @ 08:34) >    EXAM:  XR CHEST PORTABLE ROUTINE 1V                          EXAM:  XR CHEST PORTABLE URGENT 1V                            PROCEDURE DATE:  11/03/2021            INTERPRETATION:  CLINICAL INFORMATION: Fever status post Whipple, evaluate for pneumonia.    TECHNIQUE: AP chest radiographs dated 11/3/2021 at 9:25 PM and 11/4/2021 at 8:31 AM.    COMPARISON: Chest x-ray dated 11/2/2021.    FINDINGS:    9:25 PM:  Left retrocardiac opacity. Trace left pleural effusion. No pneumothorax.  Heart size is poorly evaluated on this view. Cardiac AICD in left chest wall. Status post TAVR.  Osseous degenerative changes. Partially imaged thoracal lumbar spinal fusion hardware.    11//2021 8:31 AM:  Slight improvement in left retrocardiac opacity.    IMPRESSION:    Left retrocardiac opacity favored to represent atelectasis, though pneumonia cannot be entirely excluded.  Trace left pleural effusion.    --- End of Report ---              TONIA FOWLER MD; Resident Radiology  This document has been electronically signed.  MARY PARDO MD; Attending Radiologist  This document has been electronically signed. Nov 4 2021 11:21AM    < end of copied text >  < from: Transthoracic Echocardiogram (10.27.21 @ 07:59) >    Patient name: JUANIS DE SANTIAGO  YOB: 1940   Age: 80 (M)   MR#: [id number]  Study Date: 10/27/2021  Location: [address] [place], [place] [id number]: Karley Zaragoza RDCS  Study quality: Technically difficult  Referring Physician: Fady Mercer MD  Blood Pressure: 112/45 mmHg  Height: 188 cm  Weight: 95 kg  BSA: 2.2 m2  ------------------------------------------------------------------------  PROCEDURE: Transthoracic echocardiogram with 2-D, M-Mode  and complete spectral and color flow Doppler.  INDICATION: Cardiogenic shock (R57.0)  ------------------------------------------------------------------------  Dimensions:    Normal Values:  LA:     4.4    2.0 - 4.0 cm  Ao:     2.7    2.0 - 3.8 cm  SEPTUM: 0.7    0.6 - 1.2 cm  PWT:    0.9    0.6 -1.1 cm  LVIDd:  5.8    3.0 - 5.6 cm  LVIDs:  4.7    1.8 - 4.0 cm  Derived variables:  LVMI: 79 g/m2  RWT: 0.31  Fractional short: 19 %  EF (Visual Estimate): 30-35 %  Doppler Peak Velocity (m/sec): AoV=1.7  ------------------------------------------------------------------------  Observations:  Mitral Valve: Mitral annular calcification and calcified  mitral leaflets with decreased diastolic opening. Mean  transmitral valve gradient equals 7 mm Hg, consistent with  moderate mitral stenosis. HR 80s  Aortic Valve/Aorta: Transcatheter aortic valve replacement.  Peak transaortic valve gradient equals 12 mm Hg, mean  transaortic valve gradient equals 7 mm Hg, which is  probably normal in the presence of a transcatheter aortic  valve replacement. No aortic valve regurgitation seen.  Aortic Root: 2.7 cm.  Left Atrium: Left atrium not well visualized.  Left Ventricle: Endocardial visualization enhanced with  intravenous injection of Ultrasonic Enhancing Agent  (Definity). Severe global left ventricular systolic  dysfunction. Mild left ventricular enlargement. Unable to  evaluate diastology.  Right Heart: Moderate right atrial enlargement.  A device  wire is noted in the right heart. The right ventricle is  not well visualized; grossly normal right ventricular  systolic function. Normal tricuspid valve. Minimal  tricuspid regurgitation. Pulmonic valve not well  visualized.  Pericardium/Pleura: Normal pericardium with no pericardial  effusion.  Hemodynamic: Estimated right atrial pressure is 8 mm Hg.  Estimated right ventricular systolic pressure equals 42 mm  Hg, assuming right atrial pressure equals 8 mm Hg,  consistent with mild pulmonary hypertension.  ------------------------------------------------------------------------  Conclusions:  1. Mitral annular calcification and calcified mitral  leaflets with decreased diastolic opening. Mean transmitral  valve gradient equals 7 mm Hg, consistent with moderate  mitral stenosis. HR 80s  2. Transcatheter aortic valve replacement. Peaktransaortic  valve gradient equals 12 mm Hg, mean transaortic valve  gradient equals 7 mm Hg, which is probably normal in the  presence of a transcatheter aortic valve replacement. No  aortic valve regurgitation seen.  3. Endocardial visualization enhanced with intravenous  injection of Ultrasonic Enhancing Agent (Definity). Severe  global left ventricular systolic dysfunction.  4. The right ventricle is not well visualized; grossly  normal right ventricular systolic function.  ------------------------------------------------------------------------  Confirmed on  10/27/2021 - 20:25:09 by KRISTI Lares  ------------------------------------------------------------------------    < end of copied text >

## 2021-11-06 NOTE — PROGRESS NOTE ADULT - SUBJECTIVE AND OBJECTIVE BOX
Interventional Radiology Follow- Up Note      80y Male s/p abdominal collection drainage on 11/6 in Interventional Radiology with Dr. Gilliland. Patient seen and examined @ bedside.   Site c/d/i with _______cc output.   Cx results ___________.   No complaints offered.    Vitals: T(F): 97.7 (11-06-21 @ 05:34), Max: 97.9 (11-05-21 @ 10:00)  HR: 78 (11-06-21 @ 05:34) (48 - 79)  BP: 118/69 (11-06-21 @ 05:34) (98/53 - 121/71)  RR: 18 (11-06-21 @ 05:34) (10 - 22)  SpO2: 99% (11-06-21 @ 05:34) (96% - 100%)  Wt(kg): --    LABS:                        9.2    6.79  )-----------( 227      ( 06 Nov 2021 05:41 )             28.3     11-06    137  |  100  |  8   ----------------------------<  100<H>  4.1   |  25  |  0.85    Ca    8.8      06 Nov 2021 05:41  Phos  3.7     11-06  Mg     1.8     11-06    TPro  6.7  /  Alb  2.9<L>  /  TBili  0.6  /  DBili  x   /  AST  20  /  ALT  20  /  AlkPhos  89  11-06    PT/INR - ( 05 Nov 2021 07:51 )   PT: 12.5 sec;   INR: 1.04 ratio         PTT - ( 05 Nov 2021 07:51 )  PTT:31.4 sec  I&O's Detail    05 Nov 2021 07:01  -  06 Nov 2021 07:00  --------------------------------------------------------  IN:    Oral Fluid: 120 mL  Total IN: 120 mL    OUT:    Bulb (mL): 70 mL    Drain (mL): 55 mL    VAC (Vacuum Assisted Closure) System (mL): 25 mL    Voided (mL): 2925 mL  Total OUT: 3075 mL    Total NET: -2955 mL            PHYSICAL EXAM:        Impression: 80y Male s/p ________    Plan:   Interventional Radiology Follow- Up Note      80y Male s/p abdominal collection drainage on 11/6 in Interventional Radiology with Dr. Gilliland. Patient seen and examined @ bedside.   Site c/d/i with 55 cc output.   Cx results pending.   Patient reports improved pain since drainage catheter placement.     Vitals: T(F): 97.7 (11-06-21 @ 05:34), Max: 97.9 (11-05-21 @ 10:00)  HR: 78 (11-06-21 @ 05:34) (48 - 79)  BP: 118/69 (11-06-21 @ 05:34) (98/53 - 121/71)  RR: 18 (11-06-21 @ 05:34) (10 - 22)  SpO2: 99% (11-06-21 @ 05:34) (96% - 100%)  Wt(kg): --    LABS:                        9.2    6.79  )-----------( 227      ( 06 Nov 2021 05:41 )             28.3     11-06    137  |  100  |  8   ----------------------------<  100<H>  4.1   |  25  |  0.85    Ca    8.8      06 Nov 2021 05:41  Phos  3.7     11-06  Mg     1.8     11-06    TPro  6.7  /  Alb  2.9<L>  /  TBili  0.6  /  DBili  x   /  AST  20  /  ALT  20  /  AlkPhos  89  11-06    PT/INR - ( 05 Nov 2021 07:51 )   PT: 12.5 sec;   INR: 1.04 ratio         PTT - ( 05 Nov 2021 07:51 )  PTT:31.4 sec  I&O's Detail    05 Nov 2021 07:01  -  06 Nov 2021 07:00  --------------------------------------------------------  IN:    Oral Fluid: 120 mL  Total IN: 120 mL    OUT:    Bulb (mL): 70 mL    Drain (mL): 55 mL    VAC (Vacuum Assisted Closure) System (mL): 25 mL    Voided (mL): 2925 mL  Total OUT: 3075 mL    Total NET: -2955 mL            PHYSICAL EXAM:  Gen: NAD, nontoxic, A&O3  Abd: abdominal binder in place. Midline/RUQ drain c/d/i, nontender)       Impression: 80y Male s/p abdominal drainage catheter placement. Patient remains afebrile and no leukocytosis. Pain improving.    Plan:  - follow up results of culture  - continue monitoring output  - flush drain as per orders  - will need follow up for tube check once output consistently <10cc daily.   - remainder of care as per primary team.

## 2021-11-07 LAB
ALBUMIN SERPL ELPH-MCNC: 3.1 G/DL — LOW (ref 3.3–5)
ALP SERPL-CCNC: 90 U/L — SIGNIFICANT CHANGE UP (ref 40–120)
ALT FLD-CCNC: 15 U/L — SIGNIFICANT CHANGE UP (ref 10–45)
ANION GAP SERPL CALC-SCNC: 12 MMOL/L — SIGNIFICANT CHANGE UP (ref 5–17)
AST SERPL-CCNC: 24 U/L — SIGNIFICANT CHANGE UP (ref 10–40)
BILIRUB SERPL-MCNC: 0.6 MG/DL — SIGNIFICANT CHANGE UP (ref 0.2–1.2)
BUN SERPL-MCNC: 8 MG/DL — SIGNIFICANT CHANGE UP (ref 7–23)
CALCIUM SERPL-MCNC: 8.9 MG/DL — SIGNIFICANT CHANGE UP (ref 8.4–10.5)
CHLORIDE SERPL-SCNC: 101 MMOL/L — SIGNIFICANT CHANGE UP (ref 96–108)
CO2 SERPL-SCNC: 22 MMOL/L — SIGNIFICANT CHANGE UP (ref 22–31)
CREAT SERPL-MCNC: 0.97 MG/DL — SIGNIFICANT CHANGE UP (ref 0.5–1.3)
GLUCOSE BLDC GLUCOMTR-MCNC: 112 MG/DL — HIGH (ref 70–99)
GLUCOSE BLDC GLUCOMTR-MCNC: 117 MG/DL — HIGH (ref 70–99)
GLUCOSE BLDC GLUCOMTR-MCNC: 122 MG/DL — HIGH (ref 70–99)
GLUCOSE BLDC GLUCOMTR-MCNC: 98 MG/DL — SIGNIFICANT CHANGE UP (ref 70–99)
GLUCOSE SERPL-MCNC: 89 MG/DL — SIGNIFICANT CHANGE UP (ref 70–99)
HCT VFR BLD CALC: 31.4 % — LOW (ref 39–50)
HGB BLD-MCNC: 9.8 G/DL — LOW (ref 13–17)
MAGNESIUM SERPL-MCNC: 1.8 MG/DL — SIGNIFICANT CHANGE UP (ref 1.6–2.6)
MCHC RBC-ENTMCNC: 31 PG — SIGNIFICANT CHANGE UP (ref 27–34)
MCHC RBC-ENTMCNC: 31.2 GM/DL — LOW (ref 32–36)
MCV RBC AUTO: 99.4 FL — SIGNIFICANT CHANGE UP (ref 80–100)
NRBC # BLD: 0 /100 WBCS — SIGNIFICANT CHANGE UP (ref 0–0)
PHOSPHATE SERPL-MCNC: 3.3 MG/DL — SIGNIFICANT CHANGE UP (ref 2.5–4.5)
PLATELET # BLD AUTO: 243 K/UL — SIGNIFICANT CHANGE UP (ref 150–400)
POTASSIUM SERPL-MCNC: 3.8 MMOL/L — SIGNIFICANT CHANGE UP (ref 3.5–5.3)
POTASSIUM SERPL-SCNC: 3.8 MMOL/L — SIGNIFICANT CHANGE UP (ref 3.5–5.3)
PROT SERPL-MCNC: 6.6 G/DL — SIGNIFICANT CHANGE UP (ref 6–8.3)
RBC # BLD: 3.16 M/UL — LOW (ref 4.2–5.8)
RBC # FLD: 14.6 % — HIGH (ref 10.3–14.5)
SODIUM SERPL-SCNC: 135 MMOL/L — SIGNIFICANT CHANGE UP (ref 135–145)
WBC # BLD: 6.13 K/UL — SIGNIFICANT CHANGE UP (ref 3.8–10.5)
WBC # FLD AUTO: 6.13 K/UL — SIGNIFICANT CHANGE UP (ref 3.8–10.5)

## 2021-11-07 PROCEDURE — 99232 SBSQ HOSP IP/OBS MODERATE 35: CPT

## 2021-11-07 PROCEDURE — 71045 X-RAY EXAM CHEST 1 VIEW: CPT | Mod: 26

## 2021-11-07 RX ORDER — POTASSIUM CHLORIDE 20 MEQ
20 PACKET (EA) ORAL ONCE
Refills: 0 | Status: COMPLETED | OUTPATIENT
Start: 2021-11-07 | End: 2021-11-07

## 2021-11-07 RX ADMIN — OXYCODONE HYDROCHLORIDE 10 MILLIGRAM(S): 5 TABLET ORAL at 16:22

## 2021-11-07 RX ADMIN — Medication 25 MILLIGRAM(S): at 05:17

## 2021-11-07 RX ADMIN — PANTOPRAZOLE SODIUM 40 MILLIGRAM(S): 20 TABLET, DELAYED RELEASE ORAL at 05:18

## 2021-11-07 RX ADMIN — PIPERACILLIN AND TAZOBACTAM 25 GRAM(S): 4; .5 INJECTION, POWDER, LYOPHILIZED, FOR SOLUTION INTRAVENOUS at 09:18

## 2021-11-07 RX ADMIN — DORZOLAMIDE HYDROCHLORIDE TIMOLOL MALEATE 1 DROP(S): 20; 5 SOLUTION/ DROPS OPHTHALMIC at 17:20

## 2021-11-07 RX ADMIN — Medication 20 MILLIEQUIVALENT(S): at 17:25

## 2021-11-07 RX ADMIN — Medication 25 MILLIGRAM(S): at 17:25

## 2021-11-07 RX ADMIN — PIPERACILLIN AND TAZOBACTAM 25 GRAM(S): 4; .5 INJECTION, POWDER, LYOPHILIZED, FOR SOLUTION INTRAVENOUS at 17:19

## 2021-11-07 RX ADMIN — HEPARIN SODIUM 5000 UNIT(S): 5000 INJECTION INTRAVENOUS; SUBCUTANEOUS at 05:18

## 2021-11-07 RX ADMIN — HEPARIN SODIUM 5000 UNIT(S): 5000 INJECTION INTRAVENOUS; SUBCUTANEOUS at 13:23

## 2021-11-07 RX ADMIN — LATANOPROST 1 DROP(S): 0.05 SOLUTION/ DROPS OPHTHALMIC; TOPICAL at 21:26

## 2021-11-07 RX ADMIN — HEPARIN SODIUM 5000 UNIT(S): 5000 INJECTION INTRAVENOUS; SUBCUTANEOUS at 21:25

## 2021-11-07 RX ADMIN — OXYCODONE HYDROCHLORIDE 10 MILLIGRAM(S): 5 TABLET ORAL at 21:27

## 2021-11-07 RX ADMIN — DORZOLAMIDE HYDROCHLORIDE TIMOLOL MALEATE 1 DROP(S): 20; 5 SOLUTION/ DROPS OPHTHALMIC at 05:19

## 2021-11-07 RX ADMIN — LIDOCAINE 1 PATCH: 4 CREAM TOPICAL at 21:27

## 2021-11-07 RX ADMIN — OXYCODONE HYDROCHLORIDE 10 MILLIGRAM(S): 5 TABLET ORAL at 04:45

## 2021-11-07 RX ADMIN — OXYCODONE HYDROCHLORIDE 10 MILLIGRAM(S): 5 TABLET ORAL at 12:10

## 2021-11-07 RX ADMIN — SENNA PLUS 1 TABLET(S): 8.6 TABLET ORAL at 11:36

## 2021-11-07 RX ADMIN — Medication 6 MILLIGRAM(S): at 21:26

## 2021-11-07 RX ADMIN — AMIODARONE HYDROCHLORIDE 400 MILLIGRAM(S): 400 TABLET ORAL at 17:24

## 2021-11-07 RX ADMIN — Medication 0.25 MILLIGRAM(S): at 21:28

## 2021-11-07 RX ADMIN — DAPTOMYCIN 132 MILLIGRAM(S): 500 INJECTION, POWDER, LYOPHILIZED, FOR SOLUTION INTRAVENOUS at 20:19

## 2021-11-07 RX ADMIN — Medication 40 MILLIGRAM(S): at 05:18

## 2021-11-07 RX ADMIN — Medication 1 DROP(S): at 17:35

## 2021-11-07 RX ADMIN — AMIODARONE HYDROCHLORIDE 400 MILLIGRAM(S): 400 TABLET ORAL at 05:18

## 2021-11-07 RX ADMIN — CHLORHEXIDINE GLUCONATE 1 APPLICATION(S): 213 SOLUTION TOPICAL at 05:21

## 2021-11-07 RX ADMIN — OXYCODONE HYDROCHLORIDE 10 MILLIGRAM(S): 5 TABLET ORAL at 05:26

## 2021-11-07 RX ADMIN — PIPERACILLIN AND TAZOBACTAM 25 GRAM(S): 4; .5 INJECTION, POWDER, LYOPHILIZED, FOR SOLUTION INTRAVENOUS at 00:02

## 2021-11-07 RX ADMIN — OXYCODONE HYDROCHLORIDE 10 MILLIGRAM(S): 5 TABLET ORAL at 17:15

## 2021-11-07 RX ADMIN — OXYCODONE HYDROCHLORIDE 10 MILLIGRAM(S): 5 TABLET ORAL at 11:36

## 2021-11-07 NOTE — PROGRESS NOTE ADULT - ASSESSMENT
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 He required icd extraction, and a subq icd was replaced. He is now s/p Whipple procedure    - now off of pressor support with improved BP  - 11/1 with sustained vt and subsequent icd shock, possibly in the setting of volume overload  - sotalol stopped, and now on amio load  - trend lfts  - cont metoprolol, with goal to titrate up as tolerated  - 4 beats nsvt cont to monitor    - known history of systolic hf (mod lv dysfunction and mod ms)  - echo 10/27 with moderate ms at HR 80, tavr in place. severe LV dysfunction  - appears more compensated from a hf perspective  - Lasix PO started  - hypoxic overnight, will repeat cxr  - Please continue to maintain strict I/Os, monitor daily weights, Cr, and K.   - entresto remains on hold, and will eventually restart it    - history of PE in 7/2021, and had been on xarelto.  is at elevated risk of vte noting pancreatic malignancy and relatively recent pe  - resume ac when safe from a surgical perspective. is presently on dvt ppx sq heparin    - now with vre bacteremia  - cont abx per id  - will end up needing yfn    - Other cardiovascular workup will depend on clinical course.  - will follow with you

## 2021-11-07 NOTE — PROGRESS NOTE ADULT - SUBJECTIVE AND OBJECTIVE BOX
24h Events:  No acute events overnight.    Subjective:   Patient seen at bedside this AM. Tolerating diet. Pain well controlled. Denies fever, chills, chest pain, shortness of breath, diarrhea, nausea, vomiting. Passing gas, having BM.     Objective:  Vital Signs  T(C): 36.8 (11-07 @ 12:33), Max: 36.9 (11-06 @ 20:30)  HR: 77 (11-07 @ 12:33) (70 - 81)  BP: 110/68 (11-07 @ 12:33) (109/68 - 116/71)  RR: 18 (11-07 @ 12:33) (18 - 18)  SpO2: 99% (11-07 @ 12:33) (96% - 99%)  11-06-21 @ 08:01  -  11-07-21 @ 07:00  --------------------------------------------------------  IN:  Total IN: 0 mL    OUT:    Bulb (mL): 50 mL    Drain (mL): 50 mL    Voided (mL): 2100 mL  Total OUT: 2200 mL    Total NET: -2200 mL      11-07-21 @ 07:01  -  11-07-21 @ 15:47  --------------------------------------------------------  IN:  Total IN: 0 mL    OUT:    Voided (mL): 600 mL  Total OUT: 600 mL    Total NET: -600 mL          Physical Exam:  GEN: resting in bed comfortably in NAD  RESP: no increased WOB  ABD: soft, non-distended, appropriately tender around incision. Midline laparotomy with wound vac, holding suction. RUQ drain w serous output.   EXTR: warm, well-perfused, no edema  NEURO: awake, alert    Labs:                        9.8    6.13  )-----------( 243      ( 07 Nov 2021 06:24 )             31.4   11-07    135  |  101  |  8   ----------------------------<  89  3.8   |  22  |  0.97    Ca    8.9      07 Nov 2021 06:24  Phos  3.3     11-07  Mg     1.8     11-07    TPro  6.6  /  Alb  3.1<L>  /  TBili  0.6  /  DBili  x   /  AST  24  /  ALT  15  /  AlkPhos  90  11-07    CAPILLARY BLOOD GLUCOSE      POCT Blood Glucose.: 122 mg/dL (07 Nov 2021 11:59)  POCT Blood Glucose.: 98 mg/dL (07 Nov 2021 07:53)  POCT Blood Glucose.: 121 mg/dL (06 Nov 2021 21:11)  POCT Blood Glucose.: 111 mg/dL (06 Nov 2021 16:38)      Imaging:

## 2021-11-07 NOTE — PROGRESS NOTE ADULT - ASSESSMENT
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 Patient now s/p Whipple 10/27, recovered in SICU, now transferred to the floor. NSVT w/ appropriate shock from AICD on 11/1, transferred to SICU for further management. Now on floor. CT 11/3 with Loculated fluid collection adjacent to the afferent jejunal loop in the right upper quadrant, now s/p IR drainage 11/5.     Plan    - s/p IR drainage 11/5, drain with serous output  - Cw tele monitoring   - Blood cx 11/4: VRE  - F/u Blood cx 11/5 and 11/6  - Abx: Zosyn and Daptomycin, vancomycin dc'd given VRE  - PICC removed given bacteremia, will need Midline catheter placement this week  - CORBIN this week  - Regular diet  - PT wound care for vac management   - Lasix 20mg   - DVT PPX; SQH  - EP recommendations appreciated, no evidence of infected AICD at this time  - Cardiology recommendations appreciate, will restart xarelto when safe from our perspective  - trend LFTs (amiodarone)  - TSH (amiodarone)  - PT: TRINA    [phone]  Red Surgery

## 2021-11-07 NOTE — PROGRESS NOTE ADULT - SUBJECTIVE AND OBJECTIVE BOX
[facility] - Milo Thomas, Adolfo, Binta, Brittanie, Javier Don  Office Number:  [phone]    Patient resting comfortably in bed in NAD.  Laying flat with no respiratory distress.  No complaints of chest pain, dyspnea, palpitations, PND, or orthopnea.    ROS: negative unless otherwise mentioned.    Telemetry:  sr, 4 beats wct, drop in o2 sat to 80's    MEDICATIONS  (STANDING):  aMIOdarone    Tablet 400 milliGRAM(s) Oral every 12 hours  chlorhexidine 4% Liquid 1 Application(s) Topical <User Schedule>  DAPTOmycin IVPB 800 milliGRAM(s) IV Intermittent every 24 hours  dorzolamide 2%/timolol 0.5% Ophthalmic Solution 1 Drop(s) Both EYES two times a day  furosemide    Tablet 40 milliGRAM(s) Oral daily  heparin   Injectable 5000 Unit(s) SubCutaneous every 8 hours  influenza   Vaccine 0.5 milliLiter(s) IntraMuscular once  insulin lispro (ADMELOG) corrective regimen sliding scale   SubCutaneous at bedtime  insulin lispro (ADMELOG) corrective regimen sliding scale   SubCutaneous three times a day before meals  ketorolac 0.5% Ophthalmic Solution 1 Drop(s) Both EYES daily  latanoprost 0.005% Ophthalmic Solution 1 Drop(s) Both EYES at bedtime  lidocaine   4% Patch 1 Patch Transdermal daily  melatonin 6 milliGRAM(s) Oral at bedtime  metoprolol tartrate 25 milliGRAM(s) Oral every 12 hours  pantoprazole    Tablet 40 milliGRAM(s) Oral before breakfast  piperacillin/tazobactam IVPB.. 3.375 Gram(s) IV Intermittent every 8 hours  senna 1 Tablet(s) Oral daily    MEDICATIONS  (PRN):  ALPRAZolam 0.25 milliGRAM(s) Oral at bedtime PRN agitation  diphenhydrAMINE Injectable 25 milliGRAM(s) IV Push every 4 hours PRN Pruritus  oxyCODONE    IR 5 milliGRAM(s) Oral every 3 hours PRN Moderate Pain (4 - 6)  oxyCODONE    IR 10 milliGRAM(s) Oral every 4 hours PRN Severe Pain (7 - 10)      Allergies    Dilaudid (Anaphylaxis; Hives)    Intolerances        Vital Signs Last 24 Hrs  T(C): 36.4 (07 Nov 2021 04:56), Max: 37.1 (06 Nov 2021 13:06)  T(F): 97.6 (07 Nov 2021 04:56), Max: 98.7 (06 Nov 2021 13:06)  HR: 81 (07 Nov 2021 04:56) (72 - 81)  BP: 109/68 (07 Nov 2021 04:56) (109/63 - 116/71)  BP(mean): --  RR: 18 (07 Nov 2021 04:56) (18 - 18)  SpO2: 96% (07 Nov 2021 04:56) (95% - 96%)    I&O's Summary    06 Nov 2021 08:01  -  07 Nov 2021 07:00  --------------------------------------------------------  IN: 980 mL / OUT: 2200 mL / NET: -1220 mL        ON EXAM:      Constitutional: NAD, awake and alert, well-developed  Eyes:  EOMI,  Pupils round, no lesions  ENMT: no exudate or erythema  Pulmonary: Non-labored, breath sounds are clear bilaterally, No wheezing, rales or rhonchi  Cardiovascular: PMI not palpable RRR normal S1 and S2, no murmurs, rubs, gallops or clicks  Gastrointestinal: Bowel Sounds present, drain noted in abdomen  Lymph: No cervical lymphadenopathy.  Neurological: Alert, no focal deficits  Skin: No rashes.  No cyanosis.  Psych:  Mood & affect appropriate   Ext: No lower ext edema    LABS: All Labs Reviewed:                        9.8    6.13  )-----------( 243      ( 07 Nov 2021 06:24 )             31.4                         9.2    6.79  )-----------( 227      ( 06 Nov 2021 05:41 )             28.3                         8.9    6.10  )-----------( 186      ( 05 Nov 2021 07:39 )             27.8     07 Nov 2021 06:24    135    |  101    |  8      ----------------------------<  89     3.8     |  22     |  0.97   06 Nov 2021 05:41    137    |  100    |  8      ----------------------------<  100    4.1     |  25     |  0.85   05 Nov 2021 07:39    137    |  99     |  10     ----------------------------<  99     4.5     |  27     |  0.94     Ca    8.9        07 Nov 2021 06:24  Ca    8.8        06 Nov 2021 05:41  Ca    8.8        05 Nov 2021 07:39  Phos  3.3       07 Nov 2021 06:24  Phos  3.7       06 Nov 2021 05:41  Phos  4.4       05 Nov 2021 07:39  Mg     1.8       07 Nov 2021 06:24  Mg     1.8       06 Nov 2021 05:41  Mg     1.9       05 Nov 2021 07:39    TPro  6.6    /  Alb  3.1    /  TBili  0.6    /  DBili  x      /  AST  24     /  ALT  15     /  AlkPhos  90     07 Nov 2021 06:24  TPro  6.7    /  Alb  2.9    /  TBili  0.6    /  DBili  x      /  AST  20     /  ALT  20     /  AlkPhos  89     06 Nov 2021 05:41  TPro  6.4    /  Alb  2.9    /  TBili  0.6    /  DBili  x      /  AST  19     /  ALT  21     /  AlkPhos  86     05 Nov 2021 07:39          Blood Culture: Organism --  Gram Stain Blood -- Gram Stain --  Specimen Source .Blood Blood  Culture-Blood --    Organism --  Gram Stain Blood -- Gram Stain   polymorphonuclear leukocytes seen  No organisms seen  by cytocentrifuge  Specimen Source .Body Fluid Peritoneal Fluid  Culture-Blood --    Organism --  Gram Stain Blood -- Gram Stain   Growth in aerobic and anaerobic bottles: Gram Positive Cocci in Pairs and  Chains  Specimen Source .Blood Blood  Culture-Blood --    Organism Blood Culture PCR  Gram Stain Blood -- Gram Stain   Growth in aerobic bottle: Gram Positive Cocci in Pairs and Chains  Growth in anaerobic bottle: Gram Positive Cocci in Pairs and Chains  Specimen Source .Blood Blood  Culture-Blood --        11-05 @ 11:42  TSH: 5.33  11-04 @ 11:59  TSH: 4.88

## 2021-11-08 LAB
ALBUMIN SERPL ELPH-MCNC: 3.2 G/DL — LOW (ref 3.3–5)
ALP SERPL-CCNC: 86 U/L — SIGNIFICANT CHANGE UP (ref 40–120)
ALT FLD-CCNC: 15 U/L — SIGNIFICANT CHANGE UP (ref 10–45)
ANION GAP SERPL CALC-SCNC: 13 MMOL/L — SIGNIFICANT CHANGE UP (ref 5–17)
AST SERPL-CCNC: 18 U/L — SIGNIFICANT CHANGE UP (ref 10–40)
BILIRUB SERPL-MCNC: 0.5 MG/DL — SIGNIFICANT CHANGE UP (ref 0.2–1.2)
BUN SERPL-MCNC: 8 MG/DL — SIGNIFICANT CHANGE UP (ref 7–23)
CALCIUM SERPL-MCNC: 9.2 MG/DL — SIGNIFICANT CHANGE UP (ref 8.4–10.5)
CHLORIDE SERPL-SCNC: 101 MMOL/L — SIGNIFICANT CHANGE UP (ref 96–108)
CO2 SERPL-SCNC: 22 MMOL/L — SIGNIFICANT CHANGE UP (ref 22–31)
CREAT SERPL-MCNC: 0.96 MG/DL — SIGNIFICANT CHANGE UP (ref 0.5–1.3)
CULTURE RESULTS: NO GROWTH — SIGNIFICANT CHANGE UP
GLUCOSE BLDC GLUCOMTR-MCNC: 113 MG/DL — HIGH (ref 70–99)
GLUCOSE BLDC GLUCOMTR-MCNC: 132 MG/DL — HIGH (ref 70–99)
GLUCOSE BLDC GLUCOMTR-MCNC: 92 MG/DL — SIGNIFICANT CHANGE UP (ref 70–99)
GLUCOSE BLDC GLUCOMTR-MCNC: 95 MG/DL — SIGNIFICANT CHANGE UP (ref 70–99)
GLUCOSE SERPL-MCNC: 98 MG/DL — SIGNIFICANT CHANGE UP (ref 70–99)
HCT VFR BLD CALC: 30.3 % — LOW (ref 39–50)
HGB BLD-MCNC: 9.7 G/DL — LOW (ref 13–17)
MAGNESIUM SERPL-MCNC: 1.9 MG/DL — SIGNIFICANT CHANGE UP (ref 1.6–2.6)
MCHC RBC-ENTMCNC: 31.1 PG — SIGNIFICANT CHANGE UP (ref 27–34)
MCHC RBC-ENTMCNC: 32 GM/DL — SIGNIFICANT CHANGE UP (ref 32–36)
MCV RBC AUTO: 97.1 FL — SIGNIFICANT CHANGE UP (ref 80–100)
NON-GYNECOLOGICAL CYTOLOGY STUDY: SIGNIFICANT CHANGE UP
NRBC # BLD: 0 /100 WBCS — SIGNIFICANT CHANGE UP (ref 0–0)
PHOSPHATE SERPL-MCNC: 3.1 MG/DL — SIGNIFICANT CHANGE UP (ref 2.5–4.5)
PLATELET # BLD AUTO: 263 K/UL — SIGNIFICANT CHANGE UP (ref 150–400)
POTASSIUM SERPL-MCNC: 4 MMOL/L — SIGNIFICANT CHANGE UP (ref 3.5–5.3)
POTASSIUM SERPL-SCNC: 4 MMOL/L — SIGNIFICANT CHANGE UP (ref 3.5–5.3)
PROT SERPL-MCNC: 6.7 G/DL — SIGNIFICANT CHANGE UP (ref 6–8.3)
RBC # BLD: 3.12 M/UL — LOW (ref 4.2–5.8)
RBC # FLD: 14.7 % — HIGH (ref 10.3–14.5)
SARS-COV-2 RNA SPEC QL NAA+PROBE: SIGNIFICANT CHANGE UP
SODIUM SERPL-SCNC: 136 MMOL/L — SIGNIFICANT CHANGE UP (ref 135–145)
SPECIMEN SOURCE: SIGNIFICANT CHANGE UP
SURGICAL PATHOLOGY STUDY: SIGNIFICANT CHANGE UP
WBC # BLD: 6.02 K/UL — SIGNIFICANT CHANGE UP (ref 3.8–10.5)
WBC # FLD AUTO: 6.02 K/UL — SIGNIFICANT CHANGE UP (ref 3.8–10.5)

## 2021-11-08 PROCEDURE — 99232 SBSQ HOSP IP/OBS MODERATE 35: CPT

## 2021-11-08 PROCEDURE — 99231 SBSQ HOSP IP/OBS SF/LOW 25: CPT

## 2021-11-08 RX ADMIN — PIPERACILLIN AND TAZOBACTAM 25 GRAM(S): 4; .5 INJECTION, POWDER, LYOPHILIZED, FOR SOLUTION INTRAVENOUS at 08:23

## 2021-11-08 RX ADMIN — Medication 0.25 MILLIGRAM(S): at 20:54

## 2021-11-08 RX ADMIN — LATANOPROST 1 DROP(S): 0.05 SOLUTION/ DROPS OPHTHALMIC; TOPICAL at 21:27

## 2021-11-08 RX ADMIN — PIPERACILLIN AND TAZOBACTAM 25 GRAM(S): 4; .5 INJECTION, POWDER, LYOPHILIZED, FOR SOLUTION INTRAVENOUS at 17:23

## 2021-11-08 RX ADMIN — CHLORHEXIDINE GLUCONATE 1 APPLICATION(S): 213 SOLUTION TOPICAL at 05:31

## 2021-11-08 RX ADMIN — DORZOLAMIDE HYDROCHLORIDE TIMOLOL MALEATE 1 DROP(S): 20; 5 SOLUTION/ DROPS OPHTHALMIC at 08:21

## 2021-11-08 RX ADMIN — HEPARIN SODIUM 5000 UNIT(S): 5000 INJECTION INTRAVENOUS; SUBCUTANEOUS at 21:27

## 2021-11-08 RX ADMIN — OXYCODONE HYDROCHLORIDE 5 MILLIGRAM(S): 5 TABLET ORAL at 21:26

## 2021-11-08 RX ADMIN — LIDOCAINE 1 PATCH: 4 CREAM TOPICAL at 07:52

## 2021-11-08 RX ADMIN — OXYCODONE HYDROCHLORIDE 10 MILLIGRAM(S): 5 TABLET ORAL at 11:20

## 2021-11-08 RX ADMIN — Medication 25 MILLIGRAM(S): at 17:23

## 2021-11-08 RX ADMIN — HEPARIN SODIUM 5000 UNIT(S): 5000 INJECTION INTRAVENOUS; SUBCUTANEOUS at 13:58

## 2021-11-08 RX ADMIN — OXYCODONE HYDROCHLORIDE 10 MILLIGRAM(S): 5 TABLET ORAL at 13:58

## 2021-11-08 RX ADMIN — AMIODARONE HYDROCHLORIDE 400 MILLIGRAM(S): 400 TABLET ORAL at 17:22

## 2021-11-08 RX ADMIN — Medication 25 MILLIGRAM(S): at 05:29

## 2021-11-08 RX ADMIN — PANTOPRAZOLE SODIUM 40 MILLIGRAM(S): 20 TABLET, DELAYED RELEASE ORAL at 05:31

## 2021-11-08 RX ADMIN — OXYCODONE HYDROCHLORIDE 10 MILLIGRAM(S): 5 TABLET ORAL at 16:43

## 2021-11-08 RX ADMIN — AMIODARONE HYDROCHLORIDE 400 MILLIGRAM(S): 400 TABLET ORAL at 05:30

## 2021-11-08 RX ADMIN — DORZOLAMIDE HYDROCHLORIDE TIMOLOL MALEATE 1 DROP(S): 20; 5 SOLUTION/ DROPS OPHTHALMIC at 17:23

## 2021-11-08 RX ADMIN — Medication 40 MILLIGRAM(S): at 05:30

## 2021-11-08 RX ADMIN — Medication 1 DROP(S): at 17:23

## 2021-11-08 RX ADMIN — Medication 6 MILLIGRAM(S): at 21:27

## 2021-11-08 RX ADMIN — OXYCODONE HYDROCHLORIDE 5 MILLIGRAM(S): 5 TABLET ORAL at 22:55

## 2021-11-08 RX ADMIN — PIPERACILLIN AND TAZOBACTAM 25 GRAM(S): 4; .5 INJECTION, POWDER, LYOPHILIZED, FOR SOLUTION INTRAVENOUS at 00:14

## 2021-11-08 RX ADMIN — LIDOCAINE 1 PATCH: 4 CREAM TOPICAL at 20:47

## 2021-11-08 RX ADMIN — HEPARIN SODIUM 5000 UNIT(S): 5000 INJECTION INTRAVENOUS; SUBCUTANEOUS at 05:30

## 2021-11-08 RX ADMIN — OXYCODONE HYDROCHLORIDE 10 MILLIGRAM(S): 5 TABLET ORAL at 08:19

## 2021-11-08 NOTE — CHART NOTE - NSCHARTNOTEFT_GEN_A_CORE
80M from [facility] with PMH pancreatic cancer (dx 3/2021, chemo) s/p ERCP s/p whipple 3/2021, HTN, HLD, CHF (EF: 35%), CAD s/p stents x2 (~15 years ago), AICD (implanted 2005, extracted and re-implantation 9/2021), TAVR (4/2021), bilateral PE (7/2021) on Xarelto, spinal stenosis, macular degeneration, GERD, BPH, Left THR (x2 revisions), left femur fracture (hardware), Osteomyelitis right foot s/p right hallux amputation 9/2021, MSSA bacteremia, explantation and reimplantation of AICD 10/4/21, PICC Line RUE was on IV antibiotics, recently changed to PO.   10/26 s/p Whipple surgery and 11/5 S/P drainage catheter placement.    Pain scores currently 0/10.  OxyContin 10 mg Q 12 hours never added to regimen.  Continue Oxy IR 5 mg or 10 mg as ordered.  Continue robaxin 750 mg Q 6 hours PRN spasm.    Monitor for sedation and respiratory depression.  Bowel regimen.  OOB/ PT per medicine team.      Signing off at this time.      [facility]  [phone]

## 2021-11-08 NOTE — PROGRESS NOTE ADULT - SUBJECTIVE AND OBJECTIVE BOX
24h Events:  No acute events overnight.    Subjective:   Patient seen at bedside this AM. Denies n/v. Tolerating Diet     Objective:  Vital Signs  T(C): 36.4 (11-08 @ 04:50), Max: 36.8 (11-07 @ 12:33)  HR: 84 (11-08 @ 04:50) (70 - 87)  BP: 101/64 (11-08 @ 04:50) (97/55 - 119/62)  RR: 18 (11-08 @ 04:50) (18 - 18)  SpO2: 96% (11-08 @ 04:50) (95% - 99%)  11-06-21 @ 08:01  -  11-07-21 @ 07:00  --------------------------------------------------------  IN:  Total IN: 0 mL    OUT:    Bulb (mL): 50 mL    Drain (mL): 50 mL    Voided (mL): 2100 mL  Total OUT: 2200 mL    Total NET: -2200 mL      11-07-21 @ 07:01  -  11-08-21 @ 05:31  --------------------------------------------------------  IN:  Total IN: 0 mL    OUT:    Bulb (mL): 20 mL    Drain (mL): 5 mL    Voided (mL): 1090 mL  Total OUT: 1115 mL    Total NET: -1115 mL          Physical Exam:  GEN:   RESP:   ABD:   EXTR:   NEURO:     Labs:                        9.8    6.13  )-----------( 243      ( 07 Nov 2021 06:24 )             31.4   11-07    135  |  101  |  8   ----------------------------<  89  3.8   |  22  |  0.97    Ca    8.9      07 Nov 2021 06:24  Phos  3.3     11-07  Mg     1.8     11-07    TPro  6.6  /  Alb  3.1<L>  /  TBili  0.6  /  DBili  x   /  AST  24  /  ALT  15  /  AlkPhos  90  11-07    CAPILLARY BLOOD GLUCOSE      POCT Blood Glucose.: 112 mg/dL (07 Nov 2021 21:31)  POCT Blood Glucose.: 117 mg/dL (07 Nov 2021 16:41)  POCT Blood Glucose.: 122 mg/dL (07 Nov 2021 11:59)  POCT Blood Glucose.: 98 mg/dL (07 Nov 2021 07:53)      Imaging:     24h Events:  No acute events overnight.    Subjective:   Patient seen at bedside this AM. Denies n/v. Tolerating Diet. Passing gas, having BM. Pain well controlled. Denies fevers, chills, CP, SOB, dizziness.     Objective:  Vital Signs  T(C): 36.4 (11-08 @ 04:50), Max: 36.8 (11-07 @ 12:33)  HR: 84 (11-08 @ 04:50) (70 - 87)  BP: 101/64 (11-08 @ 04:50) (97/55 - 119/62)  RR: 18 (11-08 @ 04:50) (18 - 18)  SpO2: 96% (11-08 @ 04:50) (95% - 99%)  11-06-21 @ 08:01  -  11-07-21 @ 07:00  --------------------------------------------------------  IN:  Total IN: 0 mL    OUT:    Bulb (mL): 50 mL    Drain (mL): 50 mL    Voided (mL): 2100 mL  Total OUT: 2200 mL    Total NET: -2200 mL      11-07-21 @ 07:01  -  11-08-21 @ 05:31  --------------------------------------------------------  IN:  Total IN: 0 mL    OUT:    Bulb (mL): 20 mL    Drain (mL): 5 mL    Voided (mL): 1090 mL  Total OUT: 1115 mL    Total NET: -1115 mL          Physical Exam:  GEN: NAD  RESP: no increased work of breathing  ABD: soft, non distended, mildly tender around incision and drains. Midline incision w VAC holding suction, RUQ IR drain w serous output, MOR w serous output  EXTR: WWP, R foot wound w dressing  NEURO: awake/alert    Labs:                        9.8    6.13  )-----------( 243      ( 07 Nov 2021 06:24 )             31.4   11-07    135  |  101  |  8   ----------------------------<  89  3.8   |  22  |  0.97    Ca    8.9      07 Nov 2021 06:24  Phos  3.3     11-07  Mg     1.8     11-07    TPro  6.6  /  Alb  3.1<L>  /  TBili  0.6  /  DBili  x   /  AST  24  /  ALT  15  /  AlkPhos  90  11-07    CAPILLARY BLOOD GLUCOSE      POCT Blood Glucose.: 112 mg/dL (07 Nov 2021 21:31)  POCT Blood Glucose.: 117 mg/dL (07 Nov 2021 16:41)  POCT Blood Glucose.: 122 mg/dL (07 Nov 2021 11:59)  POCT Blood Glucose.: 98 mg/dL (07 Nov 2021 07:53)      Imaging:

## 2021-11-08 NOTE — PROGRESS NOTE ADULT - ASSESSMENT
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 He required icd extraction, and a subq icd was replaced. He is now s/p Whipple procedure    - now off of pressor support with improved BP  - 11/1 with sustained vt and subsequent icd shock, possibly in the setting of volume overload  - sotalol stopped, and now on amio load  - trend lfts  - cont metoprolol, with goal to titrate up as tolerated  - no further NSVT    - known history of systolic hf (mod lv dysfunction and mod ms)  - echo 10/27 with moderate ms at HR 80, tavr in place. severe LV dysfunction  - appears more compensated from a hf perspective  - cxr from 11/7 with only trace pleural effusion  - Lasix PO    - Please continue to maintain strict I/Os, monitor daily weights, Cr, and K.   - entresto remains on hold, and will eventually restart it when bp improves    - history of PE in 7/2021, and had been on xarelto.  is at elevated risk of vte noting pancreatic malignancy and relatively recent pe  - resume ac when safe from a surgical perspective. is presently on dvt ppx sq heparin    - now with vre bacteremia  - cont abx per id  - will end up needing yfn. timing will be based on clinical course and will need ok for surgery    - Other cardiovascular workup will depend on clinical course.  - will follow with you

## 2021-11-08 NOTE — PROGRESS NOTE ADULT - SUBJECTIVE AND OBJECTIVE BOX
Interventional Radiology Follow- Up Note     Patient seen and examined @ bedside at 9am      80y Male s/p abdominal collection drainage on 11/6 in Interventional Radiology with Dr. Gilliland.      S: some pain along  abd collection drain placed by IR, improving   no acute overnight events.        Medication:   aMIOdarone    Tablet: (11-08)  DAPTOmycin IVPB: (11-07)  furosemide    Tablet: (11-08)  heparin   Injectable: (11-08)  metoprolol tartrate: (11-08)  piperacillin/tazobactam IVPB..: (11-08)    Vitals:   T(F): 97.2, Max: 98.2 (16:33)  HR: 73  BP: 102/65  RR: 17  SpO2: 97%            PHYSICAL EXAM:  Gen: NAD, nontoxic, A&O3  Abd: abdominal binder in place. Midline/RUQ drain c/d/i, ttp)   24 abd drain output: 40cc output.  bulb sxn delfin drain      LABS:  Aspartate Aminotransferase (AST/SGOT): 18 U/L (11-08-21 @ 06:07)  Alanine Aminotransferase (ALT/SGPT): 15 U/L (11-08-21 @ 06:07)  Aspartate Aminotransferase (AST/SGOT): 24 U/L (11-07-21 @ 06:24)  Alanine Aminotransferase (ALT/SGPT): 15 U/L (11-07-21 @ 06:24)        LABS:  Na: 136 (11-08 @ 06:07), 135 (11-07 @ 06:24), 137 (11-06 @ 05:41)  K: 4.0 (11-08 @ 06:07), 3.8 (11-07 @ 06:24), 4.1 (11-06 @ 05:41)  Cl: 101 (11-08 @ 06:07), 101 (11-07 @ 06:24), 100 (11-06 @ 05:41)  CO2: 22 (11-08 @ 06:07), 22 (11-07 @ 06:24), 25 (11-06 @ 05:41)  BUN: 8 (11-08 @ 06:07), 8 (11-07 @ 06:24), 8 (11-06 @ 05:41)  Cr: 0.96 (11-08 @ 06:07), 0.97 (11-07 @ 06:24), 0.85 (11-06 @ 05:41)  Glu: 98(11-08 @ 06:07), 89(11-07 @ 06:24), 100(11-06 @ 05:41)    Hgb: 9.7 (11-08 @ 06:07), 9.8 (11-07 @ 06:24), 9.2 (11-06 @ 05:41)  Hct: 30.3 (11-08 @ 06:07), 31.4 (11-07 @ 06:24), 28.3 (11-06 @ 05:41)  WBC: 6.02 (11-08 @ 06:07), 6.13 (11-07 @ 06:24), 6.79 (11-06 @ 05:41)  Plt: 263 (11-08 @ 06:07), 243 (11-07 @ 06:24), 227 (11-06 @ 05:41)       LIVER FUNCTIONS - ( 08 Nov 2021 06:07 )  Alb: 3.2 g/dL / Pro: 6.7 g/dL / ALK PHOS: 86 U/L / ALT: 15 U/L / AST: 18 U/L / GGT: x                 Bilirubin Total, Serum: 0.5 mg/dL (11-08-21 @ 06:07)        Culture - Body Fluid with Gram Stain (collected 11-05-21 @ 13:01)  Source: .Body Fluid Peritoneal Fluid  Gram Stain (11-05-21 @ 20:31):    polymorphonuclear leukocytes seen    No organisms seen    by cytocentrifuge  Preliminary Report (11-06-21 @ 12:30):    No growth to date.      A/P:   81 y/o male w/ a PMHx of CAD s/p stents x2, HFrEF of ~35% s/p AICD, s/p TAVR, HTN, HLD, bilateral PEs, MSSA bacteremia secondary to right foot osteomyelitis s/p debridement, GERD, BPH, anxiety, insomnia, spinal stenosis, macular degeneration, left KARIE w/ revision x2, and pancreatic CA s/p neoadjuvant chemotherapy presenting s/p Whipple w/ a post-op course c/b hypovolemic shock, episodes of NSVT, and an allergic reaction questionable Dilaudid, re-admit to SICU for s/p shock for arrythmia. Pt found to have Right upper quadrant collection on CT.    11/6  s/p abdominal drainage catheter placement.          IR POD2:   11/6  s/p abdominal drainage catheter placement w/Dr Gilliland    - abx per primary team ,  Patient remains afebrile and no leukocytosis. Pain improving.  - continue monitoring output  - flush drain as per orders  - will need follow up for tube check once output consistently <10cc daily.   - remainder of care as per primary team.   - Flush drain with 5cc NS daily forward only; keep to bulb suction. DO NOT aspirate  - Change dressing q3 days or when dressing is saturated.  -  Monitor h/h; transfuse as needed  - Trend vs/labs  - Continue global management per primary team  - If the patient is d/c home with drainage catheter, pt can make an appointment with IR by calling the IR booking office at [phone]; recommend IR follow in 7-10days for tube evaluation.  - Pt will benefit from VNS service to help with drainage catheter care; Pt should continue same drainage catheter care as an outpatient.     Please call IR at [phone] with any questions, concerns, or issues regarding above.

## 2021-11-08 NOTE — PROGRESS NOTE ADULT - ASSESSMENT
80M with R foot partial first ray resection dehiscence   - Pt seen and evaluated   - R foot surgical site proximal sutures intact, distal sutures dehisced. Surgical flaps are warm and viable, no erythema, no malodor, no signs of acute infection  - significant signs of healing noted, no longer probing to bone at this time   -Pod plan for local wound care w/ daily packing  - RFXR changes likely post surgical changes  - pt underwent a 6 week antibiotics course after surgery in september  - will continue to monitor for signs of worsening at this time, no plan for surgical intervention at this time

## 2021-11-08 NOTE — PROGRESS NOTE ADULT - SUBJECTIVE AND OBJECTIVE BOX
[facility] -- Milo Thomas, Adolfo, Binta, Brittanie, Javier Don  Office # [phone]      Follow Up:  CHF,     Subjective/Observations: Patient seen and examined. Events noted. Resting comfortably in bed. No complaints of chest pain,   or palpitations reported. No signs of orthopnea or PND. Dyspnea resolved. +Abd pain      REVIEW OF SYSTEMS: All other review of systems is negative unless indicated above    PAST MEDICAL & SURGICAL HISTORY:  HTN (hypertension)    HLD (hyperlipidemia)    GERD (gastroesophageal reflux disease)    Cardiomyopathy    MSSA bacteremia  9/2021    Acute osteomyelitis    Pulmonary embolism    Pancreas cancer  chemo    [place] (hard of hearing)  B/L hearing aids    History of total hip replacement  left X 1, 2 revisions    History of laminectomy  X3    ICD (implantable cardiac defibrillator) in place  implanted 2005, replaced 10/4/2021 [facility] Subcutaneous ICD    Benign testicular tumor  radiation    Status post amputation of toe of right foot  8/2021    Status post fracture of femur  2017 left with hardware    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    H/O Whipple procedure  2/2021        MEDICATIONS  (STANDING):  aMIOdarone    Tablet 400 milliGRAM(s) Oral every 12 hours  chlorhexidine 4% Liquid 1 Application(s) Topical <User Schedule>  DAPTOmycin IVPB 800 milliGRAM(s) IV Intermittent every 24 hours  dorzolamide 2%/timolol 0.5% Ophthalmic Solution 1 Drop(s) Both EYES two times a day  furosemide    Tablet 40 milliGRAM(s) Oral daily  heparin   Injectable 5000 Unit(s) SubCutaneous every 8 hours  influenza   Vaccine 0.5 milliLiter(s) IntraMuscular once  insulin lispro (ADMELOG) corrective regimen sliding scale   SubCutaneous three times a day before meals  insulin lispro (ADMELOG) corrective regimen sliding scale   SubCutaneous at bedtime  ketorolac 0.5% Ophthalmic Solution 1 Drop(s) Both EYES daily  latanoprost 0.005% Ophthalmic Solution 1 Drop(s) Both EYES at bedtime  lidocaine   4% Patch 1 Patch Transdermal daily  melatonin 6 milliGRAM(s) Oral at bedtime  metoprolol tartrate 25 milliGRAM(s) Oral every 12 hours  pantoprazole    Tablet 40 milliGRAM(s) Oral before breakfast  piperacillin/tazobactam IVPB.. 3.375 Gram(s) IV Intermittent every 8 hours  senna 1 Tablet(s) Oral daily    MEDICATIONS  (PRN):  ALPRAZolam 0.25 milliGRAM(s) Oral at bedtime PRN agitation  diphenhydrAMINE Injectable 25 milliGRAM(s) IV Push every 4 hours PRN Pruritus  oxyCODONE    IR 10 milliGRAM(s) Oral every 4 hours PRN Severe Pain (7 - 10)  oxyCODONE    IR 5 milliGRAM(s) Oral every 3 hours PRN Moderate Pain (4 - 6)      Allergies    Dilaudid (Anaphylaxis; Hives)    Intolerances            Vital Signs Last 24 Hrs  T(C): 36.4 (08 Nov 2021 04:50), Max: 36.8 (07 Nov 2021 12:33)  T(F): 97.6 (08 Nov 2021 04:50), Max: 98.2 (07 Nov 2021 12:33)  HR: 84 (08 Nov 2021 04:50) (70 - 87)  BP: 101/64 (08 Nov 2021 04:50) (97/55 - 119/62)  BP(mean): --  RR: 18 (08 Nov 2021 04:50) (18 - 18)  SpO2: 96% (08 Nov 2021 04:50) (95% - 99%)    I&O's Summary    07 Nov 2021 07:01  -  08 Nov 2021 07:00  --------------------------------------------------------  IN: 440 mL / OUT: 1595 mL / NET: -1155 mL          PHYSICAL EXAM:  TELE: SR  Constitutional: NAD, awake    HEENT: Moist Mucous Membranes, Anicteric  Pulmonary: Decreased breath sounds b/l. No rales, crackles or wheeze appreciated.   Cardiovascular: Regular, S1 and S2, No murmurs, rubs, gallops or clicks  Gastrointestinal: Bowel Sounds present, soft,  tender.   Lymph: No peripheral edema. No lymphadenopathy.  Skin: No visible rashes or ulcers. + wound vac  Psych:  Mood & affect appropriate for situation    LABS: All Labs Reviewed:                        9.7    6.02  )-----------( 263      ( 08 Nov 2021 06:07 )             30.3                         9.8    6.13  )-----------( 243      ( 07 Nov 2021 06:24 )             31.4                         9.2    6.79  )-----------( 227      ( 06 Nov 2021 05:41 )             28.3     08 Nov 2021 06:07    136    |  101    |  8      ----------------------------<  98     4.0     |  22     |  0.96   07 Nov 2021 06:24    135    |  101    |  8      ----------------------------<  89     3.8     |  22     |  0.97   06 Nov 2021 05:41    137    |  100    |  8      ----------------------------<  100    4.1     |  25     |  0.85     Ca    9.2        08 Nov 2021 06:07  Ca    8.9        07 Nov 2021 06:24  Ca    8.8        06 Nov 2021 05:41  Phos  3.1       08 Nov 2021 06:07  Phos  3.3       07 Nov 2021 06:24  Phos  3.7       06 Nov 2021 05:41  Mg     1.9       08 Nov 2021 06:07  Mg     1.8       07 Nov 2021 06:24  Mg     1.8       06 Nov 2021 05:41    TPro  6.7    /  Alb  3.2    /  TBili  0.5    /  DBili  x      /  AST  18     /  ALT  15     /  AlkPhos  86     08 Nov 2021 06:07  TPro  6.6    /  Alb  3.1    /  TBili  0.6    /  DBili  x      /  AST  24     /  ALT  15     /  AlkPhos  90     07 Nov 2021 06:24  TPro  6.7    /  Alb  2.9    /  TBili  0.6    /  DBili  x      /  AST  20     /  ALT  20     /  AlkPhos  89     06 Nov 2021 05:41

## 2021-11-08 NOTE — PROGRESS NOTE ADULT - ASSESSMENT
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 Patient now s/p Whipple 10/27, recovered in SICU, now transferred to the floor. NSVT w/ appropriate shock from AICD on 11/1, transferred to SICU for further management. Now on floor. CT 11/3 with Loculated fluid collection adjacent to the afferent jejunal loop in the right upper quadrant, now s/p IR drainage 11/5. Blood cx 11/4 w VRE, on daptomycin. Blood cx 11/6 and 11/7 NGTD.     Plan    - VAC change today  - s/p IR drainage 11/5, drain with serous output  - Cw tele monitoring   - Blood cx 11/4: VRE  - F/u Blood cx 11/5 and 11/6: NGTD prelim  - Abx: Zosyn and Daptomycin, vancomycin dc'd given VRE  - PICC removed given bacteremia, will need Midline catheter placement this week  - CORBIN this week  - Regular diet  - PT wound care for vac management   - Lasix 20mg   - DVT PPX; SQH  - EP recommendations appreciated, no evidence of infected AICD at this time  - Cardiology recommendations appreciate, will restart xarelto when safe from our perspective  - trend LFTs (amiodarone)  - TSH (amiodarone)  - PT: TRINA    [phone]  Red Surgery

## 2021-11-08 NOTE — PROGRESS NOTE ADULT - SUBJECTIVE AND OBJECTIVE BOX
Follow Up:  bacteremia    Interval History: afebrile. no acute overnight events.     REVIEW OF SYSTEMS  [  ] ROS unobtainable because:    [ x ] All other systems negative except as noted below    Constitutional:  [ ] fever [ ] chills  [ ] weight loss  [ ] weakness  Skin:  [ ] rash [ ] phlebitis	  Eyes: [ ] icterus [ ] pain  [ ] discharge	  ENMT: [ ] sore throat  [ ] thrush [ ] ulcers [ ] exudates  Respiratory: [ ] dyspnea [ ] hemoptysis [ ] cough [ ] sputum	  Cardiovascular:  [ ] chest pain [ ] palpitations [ ] edema	  Gastrointestinal:  [ ] nausea [ ] vomiting [ ] diarrhea [ ] constipation [ ] pain	  Genitourinary:  [ ] dysuria [ ] frequency [ ] hematuria [ ] discharge [ ] flank pain  [ ] incontinence  Musculoskeletal:  [ ] myalgias [ ] arthralgias [ ] arthritis  [ ] back pain  Neurological:  [ ] headache [ ] seizures  [ ] confusion/altered mental status    Allergies  Dilaudid (Anaphylaxis; Hives)        ANTIMICROBIALS:  piperacillin/tazobactam IVPB.. 3.375 every 8 hours      OTHER MEDS:  MEDICATIONS  (STANDING):  ALPRAZolam 0.25 at bedtime PRN  diphenhydrAMINE Injectable 25 every 4 hours PRN  furosemide    Tablet 40 daily  heparin   Injectable 5000 every 8 hours  influenza   Vaccine 0.5 once  insulin lispro (ADMELOG) corrective regimen sliding scale  three times a day before meals  insulin lispro (ADMELOG) corrective regimen sliding scale  at bedtime  melatonin 6 at bedtime  metoprolol tartrate 25 every 12 hours  oxyCODONE    IR 5 every 3 hours PRN  oxyCODONE    IR 10 every 4 hours PRN  pantoprazole    Tablet 40 before breakfast  senna 1 daily      Vital Signs Last 24 Hrs  T(C): 36.2 (08 Nov 2021 10:49), Max: 36.8 (07 Nov 2021 20:22)  T(F): 97.2 (08 Nov 2021 10:49), Max: 98.2 (07 Nov 2021 20:22)  HR: 80 (08 Nov 2021 17:20) (73 - 87)  BP: 113/72 (08 Nov 2021 17:20) (97/55 - 113/72)  BP(mean): --  RR: 17 (08 Nov 2021 10:49) (17 - 18)  SpO2: 97% (08 Nov 2021 10:49) (95% - 98%)    PHYSICAL EXAMINATION:  General: Alert and Awake, NAD  Cardiac: RRR, No M/R/G  Resp: CTAB, No Wh/Rh/Ra  Abdomen: NBS, NT/ND, No HSM, No rigidity or guarding  MSK: Dressing over the RLE foot. No LE edema. No Calf tenderness  : No hernandez  Skin: No rashes or lesions. Skin is warm and dry to the touch.   Neuro: Alert and Awake. CN 2-12 Grossly intact. Moves all four extremities spontaneously.  Psych: Calm, Pleasant, Cooperative                          9.7    6.02  )-----------( 263      ( 08 Nov 2021 06:07 )             30.3       11-08    136  |  101  |  8   ----------------------------<  98  4.0   |  22  |  0.96    Ca    9.2      08 Nov 2021 06:07  Phos  3.1     11-08  Mg     1.9     11-08    TPro  6.7  /  Alb  3.2<L>  /  TBili  0.5  /  DBili  x   /  AST  18  /  ALT  15  /  AlkPhos  86  11-08          MICROBIOLOGY:  v  .Blood Blood  11-07-21   No growth to date.  --  --      .Blood Blood  11-06-21   No growth to date.  --  --      .Blood Blood  11-06-21   No growth to date.  --  --      .Body Fluid Peritoneal Fluid  11-05-21   No growth to date.  --    polymorphonuclear leukocytes seen  No organisms seen  by cytocentrifuge      .Blood Blood  11-04-21   Growth in aerobic and anaerobic bottles: Enterococcus faecalis  See previous culture [id number]  --    Growth in aerobic and anaerobic bottles: Gram Positive Cocci in Pairs and  Chains      .Blood Blood  11-04-21   Growth in aerobic and anaerobic bottles: Enterococcus faecalis  ***Blood Panel PCR results on this specimen are available  approximately 3 hours after the Gram stain result.***  Gram stain, PCR, and/or culture results may not always  correspond dueto difference in methodologies.  ************************************************************  This PCR assay was performed by multiplex PCR. This  Assay tests for 66 bacterial and resistance gene targets.  Please refer to the [facility] test directory  at [email or web address] for details.  --  Blood Culture PCR  Enterococcus faecalis    RADIOLOGY:    <The imaging below has been reviewed and visualized by me independently. Findings as detailed in report below>    < from: Xray Chest 1 View- PORTABLE-Routine (Xray Chest 1 View- PORTABLE-Routine .) (11.07.21 @ 09:37) >  IMPRESSION:  Trace left pleural effusion.    < end of copied text >

## 2021-11-08 NOTE — PROGRESS NOTE ADULT - SUBJECTIVE AND OBJECTIVE BOX
24H hour events: Tele SR 70-90s. Occasional PVCs. Pt denies palpitations, chest pain, SOB. Reports abdominal pain. No pain at S-ICD site.     MEDICATIONS:  aMIOdarone    Tablet 400 milliGRAM(s) Oral every 12 hours  furosemide    Tablet 40 milliGRAM(s) Oral daily  heparin   Injectable 5000 Unit(s) SubCutaneous every 8 hours  metoprolol tartrate 25 milliGRAM(s) Oral every 12 hours  DAPTOmycin IVPB 800 milliGRAM(s) IV Intermittent every 24 hours  piperacillin/tazobactam IVPB.. 3.375 Gram(s) IV Intermittent every 8 hours  diphenhydrAMINE Injectable 25 milliGRAM(s) IV Push every 4 hours PRN  ALPRAZolam 0.25 milliGRAM(s) Oral at bedtime PRN  melatonin 6 milliGRAM(s) Oral at bedtime  oxyCODONE    IR 10 milliGRAM(s) Oral every 4 hours PRN  oxyCODONE    IR 5 milliGRAM(s) Oral every 3 hours PRN  pantoprazole    Tablet 40 milliGRAM(s) Oral before breakfast  senna 1 Tablet(s) Oral daily  insulin lispro (ADMELOG) corrective regimen sliding scale   SubCutaneous three times a day before meals  insulin lispro (ADMELOG) corrective regimen sliding scale   SubCutaneous at bedtime  chlorhexidine 4% Liquid 1 Application(s) Topical <User Schedule>  dorzolamide 2%/timolol 0.5% Ophthalmic Solution 1 Drop(s) Both EYES two times a day  influenza   Vaccine 0.5 milliLiter(s) IntraMuscular once  ketorolac 0.5% Ophthalmic Solution 1 Drop(s) Both EYES daily  latanoprost 0.005% Ophthalmic Solution 1 Drop(s) Both EYES at bedtime  lidocaine   4% Patch 1 Patch Transdermal daily      REVIEW OF SYSTEMS:  Complete 10point ROS negative.    PHYSICAL EXAM:  T(C): 36.2 (11-08-21 @ 10:49), Max: 36.8 (11-07-21 @ 12:33)  HR: 73 (11-08-21 @ 10:49) (70 - 87)  BP: 102/65 (11-08-21 @ 10:49) (97/55 - 119/62)  RR: 17 (11-08-21 @ 10:49) (17 - 18)  SpO2: 97% (11-08-21 @ 10:49) (95% - 99%)  Wt(kg): --  I&O's Summary    07 Nov 2021 07:01  -  08 Nov 2021 07:00  --------------------------------------------------------  IN: 440 mL / OUT: 1595 mL / NET: -1155 mL    08 Nov 2021 07:01  -  08 Nov 2021 11:54  --------------------------------------------------------  IN: 300 mL / OUT: 400 mL / NET: -100 mL        Appearance: Normal	  Cardiovascular: Normal S1 S2  Respiratory: Lungs clear to auscultation	  Psychiatry: A & O x 3, Mood & affect appropriate  Skin: Left lateral pectoral region: incision intact. No discharge/bleeding/edema/erythema. Nontender to palpation.   Neurologic: Non-focal  Extremities: No BLE edema        LABS:	 	    CBC Full  -  ( 08 Nov 2021 06:07 )  WBC Count : 6.02 K/uL  Hemoglobin : 9.7 g/dL  Hematocrit : 30.3 %  Platelet Count - Automated : 263 K/uL  Mean Cell Volume : 97.1 fl  Mean Cell Hemoglobin : 31.1 pg  Mean Cell Hemoglobin Concentration : 32.0 gm/dL    11-08    136  |  101  |  8   ----------------------------<  98  4.0   |  22  |  0.96  11-07    135  |  101  |  8   ----------------------------<  89  3.8   |  22  |  0.97    Ca    9.2      08 Nov 2021 06:07  Ca    8.9      07 Nov 2021 06:24  Phos  3.1     11-08  Phos  3.3     11-07  Mg     1.9     11-08  Mg     1.8     11-07    TPro  6.7  /  Alb  3.2<L>  /  TBili  0.5  /  DBili  x   /  AST  18  /  ALT  15  /  AlkPhos  86  11-08  TPro  6.6  /  Alb  3.1<L>  /  TBili  0.6  /  DBili  x   /  AST  24  /  ALT  15  /  AlkPhos  90  11-07    TSH: Thyroid Stimulating Hormone, Serum in AM (11.05.21 @ 11:42)    Thyroid Stimulating Hormone, Serum: 5.33 uIU/mL      CARDIAC MARKERS: Troponin T, High Sensitivity (11.04.21 @ 05:21)    Troponin T, High Sensitivity Result: 76: Specimen not hemolyzed    TELEMETRY: SR 70-90s, occasional PVCs  PREVIOUS DIAGNOSTIC TESTING:    [ ] Echocardiogram: < from: Transthoracic Echocardiogram (10.27.21 @ 07:59) >  Dimensions:    Normal Values:  LA:     4.4    2.0 - 4.0 cm  Ao:     2.7    2.0 - 3.8 cm  SEPTUM: 0.7    0.6 - 1.2 cm  PWT:    0.9    0.6 -1.1 cm  LVIDd:  5.8    3.0 - 5.6 cm  LVIDs:  4.7    1.8 - 4.0 cm  Derived variables:  LVMI: 79 g/m2  RWT: 0.31  Fractional short: 19 %  EF (Visual Estimate): 30-35 %  Doppler Peak Velocity (m/sec): AoV=1.7  ------------------------------------------------------------------------  Observations:  Mitral Valve: Mitral annular calcification and calcified  mitral leaflets with decreased diastolic opening. Mean  transmitral valve gradient equals 7 mm Hg, consistent with  moderate mitral stenosis. HR 80s  Aortic Valve/Aorta: Transcatheter aortic valve replacement.  Peak transaortic valve gradient equals 12 mm Hg, mean  transaortic valve gradient equals 7 mm Hg, which is  probably normal in the presence of a transcatheter aortic  valve replacement. No aortic valve regurgitation seen.  Aortic Root: 2.7 cm.  Left Atrium: Left atrium not well visualized.  Left Ventricle: Endocardial visualization enhanced with  intravenous injection of Ultrasonic Enhancing Agent  (Definity). Severe global left ventricular systolic  dysfunction. Mild left ventricular enlargement. Unable to  evaluate diastology.  Right Heart: Moderate right atrial enlargement.  A device  wire is noted in the right heart. The right ventricle is  not well visualized; grossly normal right ventricular  systolic function. Normal tricuspid valve. Minimal  tricuspid regurgitation. Pulmonic valve not well  visualized.  Pericardium/Pleura: Normal pericardium with no pericardial  effusion.  Hemodynamic: Estimated right atrial pressure is 8 mm Hg.  Estimated right ventricular systolic pressure equals 42 mm  Hg, assuming right atrial pressure equals 8 mm Hg,  consistent with mild pulmonary hypertension.  ------------------------------------------------------------------------  Conclusions:  1. Mitral annular calcification and calcified mitral  leaflets with decreased diastolic opening. Mean transmitral  valve gradient equals 7 mm Hg, consistent with moderate  mitral stenosis. HR 80s  2. Transcatheter aortic valve replacement. Peaktransaortic  valve gradient equals 12 mm Hg, mean transaortic valve  gradient equals 7 mm Hg, which is probably normal in the  presence of a transcatheter aortic valve replacement. No  aortic valve regurgitation seen.  3. Endocardial visualization enhanced with intravenous  injection of Ultrasonic Enhancing Agent (Definity). Severe  global left ventricular systolic dysfunction.  4. The right ventricle is not well visualized; grossly  normal right ventricular systolic function.    < end of copied text >

## 2021-11-08 NOTE — PROGRESS NOTE ADULT - ASSESSMENT
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    1) HFrEF   2) VT   3) Bacteremia  4) pancreatic CA    - S/p Whipple procedure for pancreatic CA with post op VT vs AT requiring ICD shock (not on telemetry at the time and not clear by intracardiac tracings).  - Sotalol held post operatively, resumed for ~ 24 hours at the time of the episode. Sotalol now discontinued with Amio load started after Sotalol washout (400mg BID x1 weeks, THEN 200mg BID x1 week, THEN maintenance dose of 200mg once daily).  -- Received total 4.8g Amio thus far  - Monitor LFTs/TFTs/PFTs while on Amiodarone therapy. Patient will need outpatient ophthalmologic evaluation yearly while on Amiodarone    - On Lopressor 25mg BID, can uptitrate as tolerated   - Keep K>4, Mg>2.   - Restart Xarelto for history of PE when cleared by primary team    - VRE bacteremia: on Zosyn and Daptomycin. ID following.   -- No signs of S-ICD infection. Continue abx per ID.     EP will sign off at this time, please reconsult with any questions.

## 2021-11-08 NOTE — PROGRESS NOTE ADULT - SUBJECTIVE AND OBJECTIVE BOX
Patient is a 80y old  Male who presents with a chief complaint of whipple (07 Nov 2021 09:09)       INTERVAL HPI/OVERNIGHT EVENTS:  Patient seen and evaluated at bedside.  Pt is resting comfortable in NAD.     Allergies    Dilaudid (Anaphylaxis; Hives)    Intolerances        Vital Signs Last 24 Hrs  T(C): 36.2 (08 Nov 2021 10:49), Max: 36.8 (07 Nov 2021 20:22)  T(F): 97.2 (08 Nov 2021 10:49), Max: 98.2 (07 Nov 2021 20:22)  HR: 80 (08 Nov 2021 17:20) (73 - 87)  BP: 113/72 (08 Nov 2021 17:20) (97/55 - 113/72)  BP(mean): --  RR: 17 (08 Nov 2021 10:49) (17 - 18)  SpO2: 97% (08 Nov 2021 10:49) (95% - 98%)    LABS:                        9.7    6.02  )-----------( 263      ( 08 Nov 2021 06:07 )             30.3     11-08    136  |  101  |  8   ----------------------------<  98  4.0   |  22  |  0.96    Ca    9.2      08 Nov 2021 06:07  Phos  3.1     11-08  Mg     1.9     11-08    TPro  6.7  /  Alb  3.2<L>  /  TBili  0.5  /  DBili  x   /  AST  18  /  ALT  15  /  AlkPhos  86  11-08        CAPILLARY BLOOD GLUCOSE      POCT Blood Glucose.: 92 mg/dL (08 Nov 2021 16:34)  POCT Blood Glucose.: 113 mg/dL (08 Nov 2021 11:21)  POCT Blood Glucose.: 95 mg/dL (08 Nov 2021 07:32)  POCT Blood Glucose.: 112 mg/dL (07 Nov 2021 21:31)      Lower Extremity Physical Exam:  Vasular: DP/PT 1/4, B/L, CFT <3 seconds B/L, Temperature gradient warm to cool, B/L.   Neuro: Epicritic sensation diminished to the level of digits, B/L.  Musculoskeletal/Ortho: s/p R foot partial ray resection on 9/20  Skin: R foot surgical site with small area of dehiscence and ulcer formation to level of bone. Surgical flaps are warm and viable, no erythema, no malodor, no signs of acute infection,does not probe to bone at this time. L foot is unremarkable for any signs of infection

## 2021-11-08 NOTE — PROGRESS NOTE ADULT - ASSESSMENT
80M (from [facility]) with PMH pancreatic cancer (dx 3/2021, chemo) s/p ERCP s/p whipple 3/2021, HTN, HLD, CHF (EF: 35%), CAD s/p stents x2 (~15 years ago), AICD (implanted 2005, extracted and re-implantation 9/2021), TAVR (4/2021), bilateral PE (7/2021) on Xarelto, spinal stenosis, macular degeneration, GERD, BPH, Left THR (x2 revisions), left femur fracture (hardware), Osteomyelitis right foot s/p right hallux amputation 9/2021, MSSA bacteremia, explantation and reimplantation of AICD 10/4/21, PICC Line RUE was on IV antibiotics, recently changed to PO.  Now, he is s/p Whipple 10/27, recovered in SICU, now transferred to the floor. NSVT w/ appropriate shock from AICD on 11/1, transferred to SICU for further management. Now on floor. CT 11/3 with Loculated fluid collection adjacent to the afferent jejunal loop in the right upper quadrant.  s/p IR aspiration /drainage.  Now with VRE bacteremia-high grade     VRE Bacteremia   - source not clear:  PICC, defibrillator, TAVR, abdominal?  - s/p PICC removal   - Enterococcus is ampicillin susceptible, would stop Daptomycin and maintain Zosyn for now. If no growth on abdominal fluid collections will deescalate to Ampicillin.   - will need CORBIN  - f/u repeat BC  - f/u sensitivities    Abdominal collections  - s/p aspiration  - f/u culture (so far with no growth)  - can continue zosyn    Foot wound  - No active s/s infection  - monitor clinically    Pancreatic ca  - s/p whipple  - otherwise, per primary team    I will continue to follow. Please feel free to contact me with any further questions.    Sanjay Andres M.D.  [facility] Division of Infectious Disease  8AM-5PM: Pager Number [phone]  After Hours (or if no response): Please contact the Infectious Diseases Office at [phone]     The above assessment and plan were discussed with Surgery Team  80M (from [facility]) with PMH pancreatic cancer (dx 3/2021, chemo) s/p ERCP s/p whipple 3/2021, HTN, HLD, CHF (EF: 35%), CAD s/p stents x2 (~15 years ago), AICD (implanted 2005, extracted and re-implantation 9/2021), TAVR (4/2021), bilateral PE (7/2021) on Xarelto, spinal stenosis, macular degeneration, GERD, BPH, Left THR (x2 revisions), left femur fracture (hardware), Osteomyelitis right foot s/p right hallux amputation 9/2021, MSSA bacteremia, explantation and reimplantation of AICD 10/4/21, PICC Line RUE was on IV antibiotics, recently changed to PO.  Now, he is s/p Whipple 10/27, recovered in SICU, now transferred to the floor. NSVT w/ appropriate shock from AICD on 11/1, transferred to SICU for further management. Now on floor. CT 11/3 with Loculated fluid collection adjacent to the afferent jejunal loop in the right upper quadrant.  s/p IR aspiration /drainage.  Now with VRE bacteremia-high grade     VRE Bacteremia   - source not clear:  PICC, defibrillator, TAVR, abdominal?  - s/p PICC removal   - Enterococcus is ampicillin susceptible, would continue Zosyn for now. If no growth on abdominal fluid collections will deescalate to Ampicillin.   - will need CORBIN  - f/u repeat BC    Abdominal collections  - s/p aspiration  - f/u culture (so far with no growth)  - can continue zosyn    Foot wound  - No active s/s infection  - monitor clinically    Pancreatic ca  - s/p whipple  - otherwise, per primary team    I will continue to follow. Please feel free to contact me with any further questions.    Snajay Andres M.D.  [facility] Division of Infectious Disease  8AM-5PM: Pager Number [phone]  After Hours (or if no response): Please contact the Infectious Diseases Office at [phone]     The above assessment and plan were discussed with Surgery Team

## 2021-11-09 LAB
ALBUMIN SERPL ELPH-MCNC: 3.6 G/DL — SIGNIFICANT CHANGE UP (ref 3.3–5)
ALP SERPL-CCNC: 94 U/L — SIGNIFICANT CHANGE UP (ref 40–120)
ALT FLD-CCNC: 12 U/L — SIGNIFICANT CHANGE UP (ref 10–45)
ANION GAP SERPL CALC-SCNC: 13 MMOL/L — SIGNIFICANT CHANGE UP (ref 5–17)
AST SERPL-CCNC: 17 U/L — SIGNIFICANT CHANGE UP (ref 10–40)
BILIRUB SERPL-MCNC: 0.6 MG/DL — SIGNIFICANT CHANGE UP (ref 0.2–1.2)
BUN SERPL-MCNC: 11 MG/DL — SIGNIFICANT CHANGE UP (ref 7–23)
CALCIUM SERPL-MCNC: 9.2 MG/DL — SIGNIFICANT CHANGE UP (ref 8.4–10.5)
CHLORIDE SERPL-SCNC: 100 MMOL/L — SIGNIFICANT CHANGE UP (ref 96–108)
CO2 SERPL-SCNC: 23 MMOL/L — SIGNIFICANT CHANGE UP (ref 22–31)
CREAT SERPL-MCNC: 0.99 MG/DL — SIGNIFICANT CHANGE UP (ref 0.5–1.3)
GLUCOSE BLDC GLUCOMTR-MCNC: 101 MG/DL — HIGH (ref 70–99)
GLUCOSE BLDC GLUCOMTR-MCNC: 112 MG/DL — HIGH (ref 70–99)
GLUCOSE BLDC GLUCOMTR-MCNC: 115 MG/DL — HIGH (ref 70–99)
GLUCOSE BLDC GLUCOMTR-MCNC: 131 MG/DL — HIGH (ref 70–99)
GLUCOSE SERPL-MCNC: 99 MG/DL — SIGNIFICANT CHANGE UP (ref 70–99)
HCT VFR BLD CALC: 32.8 % — LOW (ref 39–50)
HGB BLD-MCNC: 10.3 G/DL — LOW (ref 13–17)
LIPASE FLD-CCNC: SIGNIFICANT CHANGE UP
MAGNESIUM SERPL-MCNC: 1.8 MG/DL — SIGNIFICANT CHANGE UP (ref 1.6–2.6)
MCHC RBC-ENTMCNC: 30.7 PG — SIGNIFICANT CHANGE UP (ref 27–34)
MCHC RBC-ENTMCNC: 31.4 GM/DL — LOW (ref 32–36)
MCV RBC AUTO: 97.9 FL — SIGNIFICANT CHANGE UP (ref 80–100)
NRBC # BLD: 0 /100 WBCS — SIGNIFICANT CHANGE UP (ref 0–0)
PHOSPHATE SERPL-MCNC: 3.9 MG/DL — SIGNIFICANT CHANGE UP (ref 2.5–4.5)
PLATELET # BLD AUTO: 329 K/UL — SIGNIFICANT CHANGE UP (ref 150–400)
POTASSIUM SERPL-MCNC: 3.7 MMOL/L — SIGNIFICANT CHANGE UP (ref 3.5–5.3)
POTASSIUM SERPL-SCNC: 3.7 MMOL/L — SIGNIFICANT CHANGE UP (ref 3.5–5.3)
PROT SERPL-MCNC: 7.1 G/DL — SIGNIFICANT CHANGE UP (ref 6–8.3)
RBC # BLD: 3.35 M/UL — LOW (ref 4.2–5.8)
RBC # FLD: 14.7 % — HIGH (ref 10.3–14.5)
SODIUM SERPL-SCNC: 136 MMOL/L — SIGNIFICANT CHANGE UP (ref 135–145)
WBC # BLD: 7.09 K/UL — SIGNIFICANT CHANGE UP (ref 3.8–10.5)
WBC # FLD AUTO: 7.09 K/UL — SIGNIFICANT CHANGE UP (ref 3.8–10.5)

## 2021-11-09 PROCEDURE — 93325 DOPPLER ECHO COLOR FLOW MAPG: CPT | Mod: 26

## 2021-11-09 PROCEDURE — 93320 DOPPLER ECHO COMPLETE: CPT | Mod: 26

## 2021-11-09 PROCEDURE — 93312 ECHO TRANSESOPHAGEAL: CPT | Mod: 26

## 2021-11-09 PROCEDURE — 99232 SBSQ HOSP IP/OBS MODERATE 35: CPT

## 2021-11-09 PROCEDURE — 76377 3D RENDER W/INTRP POSTPROCES: CPT | Mod: 26

## 2021-11-09 RX ORDER — OXYCODONE HYDROCHLORIDE 5 MG/1
5 TABLET ORAL EVERY 4 HOURS
Refills: 0 | Status: DISCONTINUED | OUTPATIENT
Start: 2021-11-09 | End: 2021-11-15

## 2021-11-09 RX ORDER — OXYCODONE HYDROCHLORIDE 5 MG/1
10 TABLET ORAL EVERY 4 HOURS
Refills: 0 | Status: DISCONTINUED | OUTPATIENT
Start: 2021-11-09 | End: 2021-11-15

## 2021-11-09 RX ORDER — DEXTROSE MONOHYDRATE, SODIUM CHLORIDE, AND POTASSIUM CHLORIDE 50; .745; 4.5 G/1000ML; G/1000ML; G/1000ML
1000 INJECTION, SOLUTION INTRAVENOUS
Refills: 0 | Status: DISCONTINUED | OUTPATIENT
Start: 2021-11-09 | End: 2021-11-09

## 2021-11-09 RX ORDER — LIPASE/PROTEASE/AMYLASE 16-48-48K
2 CAPSULE,DELAYED RELEASE (ENTERIC COATED) ORAL
Refills: 0 | Status: DISCONTINUED | OUTPATIENT
Start: 2021-11-09 | End: 2021-11-15

## 2021-11-09 RX ORDER — POTASSIUM CHLORIDE 20 MEQ
20 PACKET (EA) ORAL ONCE
Refills: 0 | Status: COMPLETED | OUTPATIENT
Start: 2021-11-09 | End: 2021-11-09

## 2021-11-09 RX ORDER — ONDANSETRON 8 MG/1
4 TABLET, FILM COATED ORAL ONCE
Refills: 0 | Status: COMPLETED | OUTPATIENT
Start: 2021-11-09 | End: 2021-11-09

## 2021-11-09 RX ORDER — MAGNESIUM SULFATE 500 MG/ML
2 VIAL (ML) INJECTION ONCE
Refills: 0 | Status: COMPLETED | OUTPATIENT
Start: 2021-11-09 | End: 2021-11-09

## 2021-11-09 RX ADMIN — PANTOPRAZOLE SODIUM 40 MILLIGRAM(S): 20 TABLET, DELAYED RELEASE ORAL at 06:57

## 2021-11-09 RX ADMIN — DORZOLAMIDE HYDROCHLORIDE TIMOLOL MALEATE 1 DROP(S): 20; 5 SOLUTION/ DROPS OPHTHALMIC at 17:36

## 2021-11-09 RX ADMIN — Medication 6 MILLIGRAM(S): at 21:30

## 2021-11-09 RX ADMIN — Medication 20 MILLIEQUIVALENT(S): at 14:50

## 2021-11-09 RX ADMIN — AMIODARONE HYDROCHLORIDE 200 MILLIGRAM(S): 400 TABLET ORAL at 17:26

## 2021-11-09 RX ADMIN — Medication 50 GRAM(S): at 13:55

## 2021-11-09 RX ADMIN — OXYCODONE HYDROCHLORIDE 5 MILLIGRAM(S): 5 TABLET ORAL at 07:30

## 2021-11-09 RX ADMIN — CHLORHEXIDINE GLUCONATE 1 APPLICATION(S): 213 SOLUTION TOPICAL at 06:58

## 2021-11-09 RX ADMIN — Medication 0.25 MILLIGRAM(S): at 22:14

## 2021-11-09 RX ADMIN — Medication 40 MILLIGRAM(S): at 06:57

## 2021-11-09 RX ADMIN — OXYCODONE HYDROCHLORIDE 5 MILLIGRAM(S): 5 TABLET ORAL at 21:29

## 2021-11-09 RX ADMIN — HEPARIN SODIUM 5000 UNIT(S): 5000 INJECTION INTRAVENOUS; SUBCUTANEOUS at 06:57

## 2021-11-09 RX ADMIN — DORZOLAMIDE HYDROCHLORIDE TIMOLOL MALEATE 1 DROP(S): 20; 5 SOLUTION/ DROPS OPHTHALMIC at 06:57

## 2021-11-09 RX ADMIN — LIDOCAINE 1 PATCH: 4 CREAM TOPICAL at 21:09

## 2021-11-09 RX ADMIN — PIPERACILLIN AND TAZOBACTAM 25 GRAM(S): 4; .5 INJECTION, POWDER, LYOPHILIZED, FOR SOLUTION INTRAVENOUS at 13:54

## 2021-11-09 RX ADMIN — OXYCODONE HYDROCHLORIDE 5 MILLIGRAM(S): 5 TABLET ORAL at 07:05

## 2021-11-09 RX ADMIN — PIPERACILLIN AND TAZOBACTAM 25 GRAM(S): 4; .5 INJECTION, POWDER, LYOPHILIZED, FOR SOLUTION INTRAVENOUS at 00:58

## 2021-11-09 RX ADMIN — ONDANSETRON 4 MILLIGRAM(S): 8 TABLET, FILM COATED ORAL at 17:27

## 2021-11-09 RX ADMIN — DEXTROSE MONOHYDRATE, SODIUM CHLORIDE, AND POTASSIUM CHLORIDE 100 MILLILITER(S): 50; .745; 4.5 INJECTION, SOLUTION INTRAVENOUS at 08:08

## 2021-11-09 RX ADMIN — Medication 25 MILLIGRAM(S): at 17:26

## 2021-11-09 RX ADMIN — HEPARIN SODIUM 5000 UNIT(S): 5000 INJECTION INTRAVENOUS; SUBCUTANEOUS at 14:51

## 2021-11-09 RX ADMIN — Medication 25 MILLIGRAM(S): at 06:57

## 2021-11-09 RX ADMIN — OXYCODONE HYDROCHLORIDE 5 MILLIGRAM(S): 5 TABLET ORAL at 22:30

## 2021-11-09 RX ADMIN — LATANOPROST 1 DROP(S): 0.05 SOLUTION/ DROPS OPHTHALMIC; TOPICAL at 21:30

## 2021-11-09 RX ADMIN — AMIODARONE HYDROCHLORIDE 200 MILLIGRAM(S): 400 TABLET ORAL at 06:58

## 2021-11-09 RX ADMIN — PIPERACILLIN AND TAZOBACTAM 25 GRAM(S): 4; .5 INJECTION, POWDER, LYOPHILIZED, FOR SOLUTION INTRAVENOUS at 17:27

## 2021-11-09 RX ADMIN — LIDOCAINE 1 PATCH: 4 CREAM TOPICAL at 08:24

## 2021-11-09 RX ADMIN — Medication 1 DROP(S): at 17:37

## 2021-11-09 RX ADMIN — HEPARIN SODIUM 5000 UNIT(S): 5000 INJECTION INTRAVENOUS; SUBCUTANEOUS at 21:31

## 2021-11-09 NOTE — PROGRESS NOTE ADULT - ASSESSMENT
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 Patient now s/p Whipple 10/27, recovered in SICU, now transferred to the floor. NSVT w/ appropriate shock from AICD on 11/1, transferred to SICU for further management. Now on floor. CT 11/3 with Loculated fluid collection adjacent to the afferent jejunal loop in the right upper quadrant, now s/p IR drainage 11/5. Blood cx 11/4 w VRE, on daptomycin. Blood cx 11/6 and 11/7 NGTD.     Plan    - VAC change tomorrow  - s/p IR drainage 11/5, drain with serous output  - Cw tele monitoring   - Blood cx 11/4: VRE  - F/u Blood cx 11/5 and 11/6: NGTD prelim  - Abx: Zosyn and Daptomycin, vancomycin dc'd given VRE  - PICC removed given bacteremia, will need Midline catheter placement this week  - CORBIN today  - npo for CORBIN today   - PT wound care for vac management   - Lasix 20mg   - DVT PPX; SQH  - EP recommendations appreciated, no evidence of infected AICD at this time  - Cardiology recommendations appreciate, will restart xarelto when safe from our perspective  - trend LFTs (amiodarone)  - TSH (amiodarone)  - PT: TRINA    [phone]  [facility]

## 2021-11-09 NOTE — PROGRESS NOTE ADULT - SUBJECTIVE AND OBJECTIVE BOX
Follow Up:  Bacteremia    Interval History: afebrile. s/p CORBIN this AM.    REVIEW OF SYSTEMS  [  ] ROS unobtainable because:    [ x ] All other systems negative except as noted below    Constitutional:  [ ] fever [ ] chills  [ ] weight loss  [ ] weakness  Skin:  [ ] rash [ ] phlebitis	  Eyes: [ ] icterus [ ] pain  [ ] discharge	  ENMT: [ ] sore throat  [ ] thrush [ ] ulcers [ ] exudates  Respiratory: [ ] dyspnea [ ] hemoptysis [ ] cough [ ] sputum	  Cardiovascular:  [ ] chest pain [ ] palpitations [ ] edema	  Gastrointestinal:  [ ] nausea [ ] vomiting [ ] diarrhea [ ] constipation [ ] pain	  Genitourinary:  [ ] dysuria [ ] frequency [ ] hematuria [ ] discharge [ ] flank pain  [ ] incontinence  Musculoskeletal:  [ ] myalgias [ ] arthralgias [ ] arthritis  [ ] back pain  Neurological:  [ ] headache [ ] seizures  [ ] confusion/altered mental status    Allergies  Dilaudid (Anaphylaxis; Hives)        ANTIMICROBIALS:  piperacillin/tazobactam IVPB.. 3.375 every 8 hours      OTHER MEDS:  MEDICATIONS  (STANDING):  ALPRAZolam 0.25 at bedtime PRN  aMIOdarone    Tablet 200 every 12 hours  furosemide    Tablet 40 daily  heparin   Injectable 5000 every 8 hours  influenza   Vaccine 0.5 once  insulin lispro (ADMELOG) corrective regimen sliding scale  at bedtime  insulin lispro (ADMELOG) corrective regimen sliding scale  three times a day before meals  melatonin 6 at bedtime  metoprolol tartrate 25 every 12 hours  oxyCODONE    IR 5 every 3 hours PRN  oxyCODONE    IR 10 every 4 hours PRN  pantoprazole    Tablet 40 before breakfast  senna 1 daily      Vital Signs Last 24 Hrs  T(C): 36.4 (09 Nov 2021 04:00), Max: 36.8 (08 Nov 2021 20:16)  T(F): 97.5 (09 Nov 2021 04:00), Max: 98.3 (08 Nov 2021 20:16)  HR: 79 (09 Nov 2021 10:56) (69 - 80)  BP: 101/65 (09 Nov 2021 10:56) (101/63 - 113/72)  BP(mean): --  RR: 18 (09 Nov 2021 10:56) (18 - 18)  SpO2: 95% (09 Nov 2021 10:56) (95% - 99%)    PHYSICAL EXAMINATION:  General: Alert and Awake, NAD  Cardiac: RRR, No M/R/G  Resp: CTAB, No Wh/Rh/Ra  Abdomen: NBS, NT/ND, R-Sided MOR Drain. No HSM, No rigidity or guarding  MSK: Dressing over the RLE foot. No LE edema. No Calf tenderness  : No hernandez  Skin: No rashes or lesions. Skin is warm and dry to the touch.   Neuro: Alert and Awake. CN 2-12 Grossly intact. Moves all four extremities spontaneously.  Psych: Calm, Pleasant, Cooperative                          10.3   7.09  )-----------( 329      ( 09 Nov 2021 07:23 )             32.8       11-09    136  |  100  |  11  ----------------------------<  99  3.7   |  23  |  0.99    Ca    9.2      09 Nov 2021 07:23  Phos  3.9     11-09  Mg     1.8     11-09    TPro  7.1  /  Alb  3.6  /  TBili  0.6  /  DBili  x   /  AST  17  /  ALT  12  /  AlkPhos  94  11-09          MICROBIOLOGY:  v  .Blood Blood  11-07-21   No growth to date.  --  --      .Catheter PICC Tip  11-06-21   No growth  --  --      .Blood Blood  11-06-21   No growth to date.  --  --      .Blood Blood  11-06-21   No growth to date.  --  --      .Body Fluid Peritoneal Fluid  11-05-21   No growth to date.  --    polymorphonuclear leukocytes seen  No organisms seen  by cytocentrifuge      .Blood Blood  11-04-21   Growth in aerobic and anaerobic bottles: Enterococcus faecalis  See previous culture [id number]  --    Growth in aerobic and anaerobic bottles: Gram Positive Cocci in Pairs and  Chains      .Blood Blood  11-04-21   Growth in aerobic and anaerobic bottles: Enterococcus faecalis  ***Blood Panel PCR results on this specimen are available  approximately 3 hours after the Gram stain result.***  Gram stain, PCR, and/or culture results may not always  correspond dueto difference in methodologies.  ************************************************************  This PCR assay was performed by multiplex PCR. This  Assay tests for 66 bacterial and resistance gene targets.  Please refer to the [facility] test directory  at [email or web address] for details.  --  Blood Culture PCR  Enterococcus faecalis    RADIOLOGY:    <The imaging below has been reviewed and visualized by me independently. Findings as detailed in report below>    < from: Xray Chest 1 View- PORTABLE-Routine (Xray Chest 1 View- PORTABLE-Routine .) (11.07.21 @ 09:37) >  IMPRESSION:  Trace left pleural effusion.    < end of copied text >

## 2021-11-09 NOTE — PROGRESS NOTE ADULT - ASSESSMENT
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 He required icd extraction, and a subq icd was replaced. He is now s/p Whipple procedure    - now off of pressor support with improved BP  - 11/1 with sustained vt and subsequent icd shock, possibly in the setting of volume overload  - sotalol stopped, and now on amio load  - trend lfts  - cont metoprolol, with goal to titrate up as tolerated  - no further NSVT    - known history of systolic hf (mod lv dysfunction and mod ms)  - echo 10/27 with moderate ms at HR 80, tavr in place. severe LV dysfunction  - appears more compensated from a hf perspective  - cxr from 11/7 with only trace pleural effusion  - Lasix PO    - Please continue to maintain strict I/Os, monitor daily weights, Cr, and K.   - entresto remains on hold, and will eventually restart it when bp improves    - history of PE in 7/2021, and had been on xarelto.  is at elevated risk of vte noting pancreatic malignancy and relatively recent pe  - resume ac when safe from a surgical perspective. is presently on dvt ppx sq heparin    - now with vre bacteremia  - cont abx per id  - will end up needing yfn, for today    - Other cardiovascular workup will depend on clinical course.  - will follow with you

## 2021-11-09 NOTE — PRE-ANESTHESIA EVALUATION ADULT - NSANTHPMHFT_GEN_ALL_CORE
80 male, bmi 29, for whipple, diagnosed in 3/21, s/p ercp and attempted Whipple then   -cardiac HTN, HLD, CAD s/p stents*2, CHFrEF with EF30-35%, s/p ICD [id number], plan for interrogation in OR, TAVR 4/21, ECHO 9/21: mod MS and mod MR, functional valves post TAVR, no leaks or  vegetations LVH moderate, LAE severe, RV normal, GAETANO; h/o PE 7/20 on xarelto, last dose 10/22 am, no signs of fulminant CHF today, on sotalol and entresto, DASI 3.98  -spinal stenosis with thoraco-lumbar hardware placement   - OM right foot, in bandages, treated for MSSA bacteremia with IV abx,, now PO, PICC RUE functional, (also had ICD lead extration for same)  -other: GERD no active symptoms, Macular degeneration, hard of hearing uses hearing aid, starting with crit 29, coag normal from10/4
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 He required icd extraction, and a subq icd was replaced.   Whipple 10/27/2021  shock from SQ ICD 11/1, changed from sotalol to amiodarone   SQ ICD batterybige good per cardiology

## 2021-11-09 NOTE — PRE-ANESTHESIA EVALUATION ADULT - NS MD HP INPLANTS MED DEV
left hip, left femur hardware/Automatic Implantable Cardioverter Defibrillator/Artificial joint/Brain/Spinal implant/Heart valve

## 2021-11-09 NOTE — PROGRESS NOTE ADULT - ASSESSMENT
80M (from [facility]) with PMH pancreatic cancer (dx 3/2021, chemo) s/p ERCP s/p whipple 3/2021, HTN, HLD, CHF (EF: 35%), CAD s/p stents x2 (~15 years ago), AICD (implanted 2005, extracted and re-implantation 9/2021), TAVR (4/2021), bilateral PE (7/2021) on Xarelto, spinal stenosis, macular degeneration, GERD, BPH, Left THR (x2 revisions), left femur fracture (hardware), Osteomyelitis right foot s/p right hallux amputation 9/2021, MSSA bacteremia, explantation and reimplantation of AICD 10/4/21, PICC Line RUE was on IV antibiotics, recently changed to PO.  Now, he is s/p Whipple 10/27, recovered in SICU, now transferred to the floor. NSVT w/ appropriate shock from AICD on 11/1, transferred to SICU for further management. Now on floor. CT 11/3 with Loculated fluid collection adjacent to the afferent jejunal loop in the right upper quadrant.  s/p IR aspiration /drainage.  Now with VRE bacteremia-high grade     VRE Bacteremia   - source not clear:  PICC, Abdominal Collections, Defibrillator, TAVR?  - s/p PICC removal   - Enterococcus is ampicillin susceptible, would continue Zosyn for now. If no growth on abdominal fluid collections will deescalate to Ampicillin.   - CORBIN without obvious vegetations but with thickened mitral valve and recommendation made for serial imaging; would followup on cardiology recommendations with respect to interval.  - f/u repeat BC    Abdominal collections  - s/p aspiration  - f/u culture (so far with no growth)  - can continue zosyn    Foot wound  - No active s/s infection  - monitor clinically    Pancreatic ca  - s/p whipple  - otherwise, per primary team    I will continue to follow. Please feel free to contact me with any further questions.    Sanjay Andres M.D.  [facility] Division of Infectious Disease  8AM-5PM: Pager Number [phone]  After Hours (or if no response): Please contact the Infectious Diseases Office at [phone]     The above assessment and plan were discussed with surgical resident

## 2021-11-09 NOTE — PROGRESS NOTE ADULT - SUBJECTIVE AND OBJECTIVE BOX
[facility] - Milo Thomas, Adolfo, Binta, Brittanie, Javier Don  Office Number:  [phone]    Patient resting comfortably in bed in NAD.  Laying flat with no respiratory distress.  No complaints of chest pain, dyspnea, palpitations, PND, or orthopnea.  reported left arm pit pain overnight    ROS: negative unless otherwise mentioned.    Telemetry:  sr    MEDICATIONS  (STANDING):  aMIOdarone    Tablet 200 milliGRAM(s) Oral every 12 hours  chlorhexidine 4% Liquid 1 Application(s) Topical <User Schedule>  dorzolamide 2%/timolol 0.5% Ophthalmic Solution 1 Drop(s) Both EYES two times a day  furosemide    Tablet 40 milliGRAM(s) Oral daily  heparin   Injectable 5000 Unit(s) SubCutaneous every 8 hours  influenza   Vaccine 0.5 milliLiter(s) IntraMuscular once  insulin lispro (ADMELOG) corrective regimen sliding scale   SubCutaneous three times a day before meals  insulin lispro (ADMELOG) corrective regimen sliding scale   SubCutaneous at bedtime  ketorolac 0.5% Ophthalmic Solution 1 Drop(s) Both EYES daily  latanoprost 0.005% Ophthalmic Solution 1 Drop(s) Both EYES at bedtime  lidocaine   4% Patch 1 Patch Transdermal daily  melatonin 6 milliGRAM(s) Oral at bedtime  metoprolol tartrate 25 milliGRAM(s) Oral every 12 hours  pantoprazole    Tablet 40 milliGRAM(s) Oral before breakfast  piperacillin/tazobactam IVPB.. 3.375 Gram(s) IV Intermittent every 8 hours  senna 1 Tablet(s) Oral daily    MEDICATIONS  (PRN):  ALPRAZolam 0.25 milliGRAM(s) Oral at bedtime PRN agitation  oxyCODONE    IR 5 milliGRAM(s) Oral every 3 hours PRN Moderate Pain (4 - 6)  oxyCODONE    IR 10 milliGRAM(s) Oral every 4 hours PRN Severe Pain (7 - 10)      Allergies    Dilaudid (Anaphylaxis; Hives)    Intolerances        Vital Signs Last 24 Hrs  T(C): 36.4 (09 Nov 2021 04:00), Max: 36.8 (08 Nov 2021 20:16)  T(F): 97.5 (09 Nov 2021 04:00), Max: 98.3 (08 Nov 2021 20:16)  HR: 79 (09 Nov 2021 04:00) (69 - 80)  BP: 101/63 (09 Nov 2021 04:00) (101/63 - 113/72)  BP(mean): --  RR: 18 (09 Nov 2021 04:00) (17 - 18)  SpO2: 99% (09 Nov 2021 04:00) (96% - 99%)    I&O's Summary    08 Nov 2021 07:01  -  09 Nov 2021 07:00  --------------------------------------------------------  IN: 1460 mL / OUT: 1874 mL / NET: -414 mL        ON EXAM:    Constitutional: NAD, awake    HEENT: Moist Mucous Membranes, Anicteric  Pulmonary: Decreased breath sounds b/l. No rales, crackles or wheeze appreciated.   Cardiovascular: Regular, S1 and S2, No murmurs, rubs, gallops or clicks  Gastrointestinal: Bowel Sounds present, soft,  tender.   Lymph: No peripheral edema. No lymphadenopathy.  Skin: No visible rashes or ulcers. + wound vac  Psych:  Mood & affect appropriate for situation    LABS: All Labs Reviewed:                        9.7    6.02  )-----------( 263      ( 08 Nov 2021 06:07 )             30.3                         9.8    6.13  )-----------( 243      ( 07 Nov 2021 06:24 )             31.4     08 Nov 2021 06:07    136    |  101    |  8      ----------------------------<  98     4.0     |  22     |  0.96   07 Nov 2021 06:24    135    |  101    |  8      ----------------------------<  89     3.8     |  22     |  0.97     Ca    9.2        08 Nov 2021 06:07  Ca    8.9        07 Nov 2021 06:24  Phos  3.1       08 Nov 2021 06:07  Phos  3.3       07 Nov 2021 06:24  Mg     1.9       08 Nov 2021 06:07  Mg     1.8       07 Nov 2021 06:24    TPro  6.7    /  Alb  3.2    /  TBili  0.5    /  DBili  x      /  AST  18     /  ALT  15     /  AlkPhos  86     08 Nov 2021 06:07  TPro  6.6    /  Alb  3.1    /  TBili  0.6    /  DBili  x      /  AST  24     /  ALT  15     /  AlkPhos  90     07 Nov 2021 06:24          Blood Culture: Organism --  Gram Stain Blood -- Gram Stain --  Specimen Source .Blood Blood  Culture-Blood --    Organism --  Gram Stain Blood -- Gram Stain --  Specimen Source .Catheter PICC Tip  Culture-Blood --    Organism --  Gram Stain Blood -- Gram Stain --  Specimen Source .Blood Blood  Culture-Blood --    Organism --  Gram Stain Blood -- Gram Stain --  Specimen Source .Blood Blood  Culture-Blood --    Organism --  Gram Stain Blood -- Gram Stain   polymorphonuclear leukocytes seen  No organisms seen  by cytocentrifuge  Specimen Source .Body Fluid Peritoneal Fluid  Culture-Blood --

## 2021-11-09 NOTE — PROGRESS NOTE ADULT - SUBJECTIVE AND OBJECTIVE BOX
24h Events:  No acute events overnight.    Subjective:   Patient seen at bedside this AM. Denies n/v. Tolerating Diet     Objective:  Vital Signs  T(C): 36.4 (11-09 @ 04:00), Max: 36.8 (11-08 @ 20:16)  HR: 79 (11-09 @ 04:00) (69 - 84)  BP: 101/63 (11-09 @ 04:00) (101/63 - 113/72)  RR: 18 (11-09 @ 04:00) (17 - 18)  SpO2: 99% (11-09 @ 04:00) (96% - 99%)  11-07-21 @ 07:01  -  11-08-21 @ 07:00  --------------------------------------------------------  IN:  Total IN: 0 mL    OUT:    Bulb (mL): 40 mL    Drain (mL): 15 mL    Voided (mL): 1540 mL  Total OUT: 1595 mL    Total NET: -1595 mL      11-08-21 @ 07:01  -  11-09-21 @ 04:43  --------------------------------------------------------  IN:  Total IN: 0 mL    OUT:    Bulb (mL): 15 mL    Drain (mL): 0 mL    Voided (mL): 1350 mL  Total OUT: 1365 mL    Total NET: -1365 mL          Physical Exam:  GEN:   RESP:   ABD:   EXTR:   NEURO:     Labs:                        9.7    6.02  )-----------( 263      ( 08 Nov 2021 06:07 )             30.3   11-08    136  |  101  |  8   ----------------------------<  98  4.0   |  22  |  0.96    Ca    9.2      08 Nov 2021 06:07  Phos  3.1     11-08  Mg     1.9     11-08    TPro  6.7  /  Alb  3.2<L>  /  TBili  0.5  /  DBili  x   /  AST  18  /  ALT  15  /  AlkPhos  86  11-08    CAPILLARY BLOOD GLUCOSE      POCT Blood Glucose.: 132 mg/dL (08 Nov 2021 20:46)  POCT Blood Glucose.: 92 mg/dL (08 Nov 2021 16:34)  POCT Blood Glucose.: 113 mg/dL (08 Nov 2021 11:21)  POCT Blood Glucose.: 95 mg/dL (08 Nov 2021 07:32)      Imaging:     24h Events:  No acute events overnight.    Subjective:   Patient seen at bedside this AM. Denies n/v. passing flatus and having bms. NPO for CORBIN today    Objective:  Vital Signs  T(C): 36.4 (11-09 @ 04:00), Max: 36.8 (11-08 @ 20:16)  HR: 79 (11-09 @ 04:00) (69 - 84)  BP: 101/63 (11-09 @ 04:00) (101/63 - 113/72)  RR: 18 (11-09 @ 04:00) (17 - 18)  SpO2: 99% (11-09 @ 04:00) (96% - 99%)  11-07-21 @ 07:01  -  11-08-21 @ 07:00  --------------------------------------------------------  IN:  Total IN: 0 mL    OUT:    Bulb (mL): 40 mL    Drain (mL): 15 mL    Voided (mL): 1540 mL  Total OUT: 1595 mL    Total NET: -1595 mL      11-08-21 @ 07:01  -  11-09-21 @ 04:43  --------------------------------------------------------  IN:  Total IN: 0 mL    OUT:    Bulb (mL): 15 mL    Drain (mL): 0 mL    Voided (mL): 1350 mL  Total OUT: 1365 mL    Total NET: -1365 mL    Physical Exam:  GEN: NAD  RESP: no increased work of breathing  ABD: soft, non distended, mildly tender around incision and drains. Midline incision w VAC holding suction, RUQ IR drain w serous output, MOR w serous output  EXTR: WWP, R foot wound w dressing  NEURO: awake/alert    Labs:                        9.7    6.02  )-----------( 263      ( 08 Nov 2021 06:07 )             30.3   11-08    136  |  101  |  8   ----------------------------<  98  4.0   |  22  |  0.96    Ca    9.2      08 Nov 2021 06:07  Phos  3.1     11-08  Mg     1.9     11-08    TPro  6.7  /  Alb  3.2<L>  /  TBili  0.5  /  DBili  x   /  AST  18  /  ALT  15  /  AlkPhos  86  11-08    CAPILLARY BLOOD GLUCOSE      POCT Blood Glucose.: 132 mg/dL (08 Nov 2021 20:46)  POCT Blood Glucose.: 92 mg/dL (08 Nov 2021 16:34)  POCT Blood Glucose.: 113 mg/dL (08 Nov 2021 11:21)  POCT Blood Glucose.: 95 mg/dL (08 Nov 2021 07:32)      Imaging:

## 2021-11-10 LAB
ALBUMIN SERPL ELPH-MCNC: 3.4 G/DL — SIGNIFICANT CHANGE UP (ref 3.3–5)
ALP SERPL-CCNC: 92 U/L — SIGNIFICANT CHANGE UP (ref 40–120)
ALT FLD-CCNC: 11 U/L — SIGNIFICANT CHANGE UP (ref 10–45)
ANION GAP SERPL CALC-SCNC: 10 MMOL/L — SIGNIFICANT CHANGE UP (ref 5–17)
AST SERPL-CCNC: 17 U/L — SIGNIFICANT CHANGE UP (ref 10–40)
BILIRUB SERPL-MCNC: 0.7 MG/DL — SIGNIFICANT CHANGE UP (ref 0.2–1.2)
BUN SERPL-MCNC: 9 MG/DL — SIGNIFICANT CHANGE UP (ref 7–23)
CALCIUM SERPL-MCNC: 9.3 MG/DL — SIGNIFICANT CHANGE UP (ref 8.4–10.5)
CHLORIDE SERPL-SCNC: 103 MMOL/L — SIGNIFICANT CHANGE UP (ref 96–108)
CO2 SERPL-SCNC: 22 MMOL/L — SIGNIFICANT CHANGE UP (ref 22–31)
CREAT SERPL-MCNC: 1.04 MG/DL — SIGNIFICANT CHANGE UP (ref 0.5–1.3)
CULTURE RESULTS: SIGNIFICANT CHANGE UP
GLUCOSE BLDC GLUCOMTR-MCNC: 110 MG/DL — HIGH (ref 70–99)
GLUCOSE BLDC GLUCOMTR-MCNC: 126 MG/DL — HIGH (ref 70–99)
GLUCOSE BLDC GLUCOMTR-MCNC: 145 MG/DL — HIGH (ref 70–99)
GLUCOSE BLDC GLUCOMTR-MCNC: 96 MG/DL — SIGNIFICANT CHANGE UP (ref 70–99)
GLUCOSE SERPL-MCNC: 100 MG/DL — HIGH (ref 70–99)
HCT VFR BLD CALC: 33.4 % — LOW (ref 39–50)
HGB BLD-MCNC: 10.3 G/DL — LOW (ref 13–17)
MAGNESIUM SERPL-MCNC: 2.1 MG/DL — SIGNIFICANT CHANGE UP (ref 1.6–2.6)
MCHC RBC-ENTMCNC: 30.5 PG — SIGNIFICANT CHANGE UP (ref 27–34)
MCHC RBC-ENTMCNC: 30.8 GM/DL — LOW (ref 32–36)
MCV RBC AUTO: 98.8 FL — SIGNIFICANT CHANGE UP (ref 80–100)
NRBC # BLD: 0 /100 WBCS — SIGNIFICANT CHANGE UP (ref 0–0)
PHOSPHATE SERPL-MCNC: 3.3 MG/DL — SIGNIFICANT CHANGE UP (ref 2.5–4.5)
PLATELET # BLD AUTO: 318 K/UL — SIGNIFICANT CHANGE UP (ref 150–400)
POTASSIUM SERPL-MCNC: 4 MMOL/L — SIGNIFICANT CHANGE UP (ref 3.5–5.3)
POTASSIUM SERPL-SCNC: 4 MMOL/L — SIGNIFICANT CHANGE UP (ref 3.5–5.3)
PROT SERPL-MCNC: 7.5 G/DL — SIGNIFICANT CHANGE UP (ref 6–8.3)
RBC # BLD: 3.38 M/UL — LOW (ref 4.2–5.8)
RBC # FLD: 14.7 % — HIGH (ref 10.3–14.5)
SODIUM SERPL-SCNC: 135 MMOL/L — SIGNIFICANT CHANGE UP (ref 135–145)
SPECIMEN SOURCE: SIGNIFICANT CHANGE UP
WBC # BLD: 5.43 K/UL — SIGNIFICANT CHANGE UP (ref 3.8–10.5)
WBC # FLD AUTO: 5.43 K/UL — SIGNIFICANT CHANGE UP (ref 3.8–10.5)

## 2021-11-10 PROCEDURE — 99232 SBSQ HOSP IP/OBS MODERATE 35: CPT

## 2021-11-10 PROCEDURE — 71045 X-RAY EXAM CHEST 1 VIEW: CPT | Mod: 26

## 2021-11-10 RX ORDER — AMPICILLIN TRIHYDRATE 250 MG
CAPSULE ORAL
Refills: 0 | Status: DISCONTINUED | OUTPATIENT
Start: 2021-11-10 | End: 2021-11-15

## 2021-11-10 RX ORDER — AMPICILLIN TRIHYDRATE 250 MG
2 CAPSULE ORAL ONCE
Refills: 0 | Status: COMPLETED | OUTPATIENT
Start: 2021-11-10 | End: 2021-11-10

## 2021-11-10 RX ORDER — AMPICILLIN TRIHYDRATE 250 MG
2 CAPSULE ORAL EVERY 4 HOURS
Refills: 0 | Status: DISCONTINUED | OUTPATIENT
Start: 2021-11-10 | End: 2021-11-15

## 2021-11-10 RX ORDER — AMPICILLIN TRIHYDRATE 250 MG
2 CAPSULE ORAL
Qty: 456 | Refills: 0
Start: 2021-11-10 | End: 2021-01-01

## 2021-11-10 RX ORDER — CEFTRIAXONE 500 MG/1
2000 INJECTION, POWDER, FOR SOLUTION INTRAMUSCULAR; INTRAVENOUS EVERY 12 HOURS
Refills: 0 | Status: DISCONTINUED | OUTPATIENT
Start: 2021-11-10 | End: 2021-11-15

## 2021-11-10 RX ORDER — CEFTRIAXONE 500 MG/1
2 INJECTION, POWDER, FOR SOLUTION INTRAMUSCULAR; INTRAVENOUS
Qty: 152 | Refills: 0
Start: 2021-11-10 | End: 2021-01-01

## 2021-11-10 RX ORDER — ONDANSETRON 8 MG/1
4 TABLET, FILM COATED ORAL ONCE
Refills: 0 | Status: COMPLETED | OUTPATIENT
Start: 2021-11-10 | End: 2021-11-10

## 2021-11-10 RX ADMIN — AMIODARONE HYDROCHLORIDE 200 MILLIGRAM(S): 400 TABLET ORAL at 05:37

## 2021-11-10 RX ADMIN — OXYCODONE HYDROCHLORIDE 10 MILLIGRAM(S): 5 TABLET ORAL at 07:51

## 2021-11-10 RX ADMIN — LATANOPROST 1 DROP(S): 0.05 SOLUTION/ DROPS OPHTHALMIC; TOPICAL at 22:45

## 2021-11-10 RX ADMIN — Medication 2 CAPSULE(S): at 12:27

## 2021-11-10 RX ADMIN — LIDOCAINE 1 PATCH: 4 CREAM TOPICAL at 21:03

## 2021-11-10 RX ADMIN — ONDANSETRON 4 MILLIGRAM(S): 8 TABLET, FILM COATED ORAL at 22:44

## 2021-11-10 RX ADMIN — HEPARIN SODIUM 5000 UNIT(S): 5000 INJECTION INTRAVENOUS; SUBCUTANEOUS at 14:38

## 2021-11-10 RX ADMIN — CHLORHEXIDINE GLUCONATE 1 APPLICATION(S): 213 SOLUTION TOPICAL at 05:43

## 2021-11-10 RX ADMIN — Medication 216 GRAM(S): at 18:34

## 2021-11-10 RX ADMIN — DORZOLAMIDE HYDROCHLORIDE TIMOLOL MALEATE 1 DROP(S): 20; 5 SOLUTION/ DROPS OPHTHALMIC at 05:37

## 2021-11-10 RX ADMIN — PANTOPRAZOLE SODIUM 40 MILLIGRAM(S): 20 TABLET, DELAYED RELEASE ORAL at 05:37

## 2021-11-10 RX ADMIN — LIDOCAINE 1 PATCH: 4 CREAM TOPICAL at 09:29

## 2021-11-10 RX ADMIN — LIDOCAINE 1 PATCH: 4 CREAM TOPICAL at 19:02

## 2021-11-10 RX ADMIN — Medication 2 CAPSULE(S): at 09:29

## 2021-11-10 RX ADMIN — OXYCODONE HYDROCHLORIDE 10 MILLIGRAM(S): 5 TABLET ORAL at 22:43

## 2021-11-10 RX ADMIN — Medication 2 CAPSULE(S): at 17:48

## 2021-11-10 RX ADMIN — HEPARIN SODIUM 5000 UNIT(S): 5000 INJECTION INTRAVENOUS; SUBCUTANEOUS at 22:44

## 2021-11-10 RX ADMIN — Medication 25 MILLIGRAM(S): at 17:50

## 2021-11-10 RX ADMIN — Medication 0.25 MILLIGRAM(S): at 22:43

## 2021-11-10 RX ADMIN — AMIODARONE HYDROCHLORIDE 200 MILLIGRAM(S): 400 TABLET ORAL at 17:49

## 2021-11-10 RX ADMIN — Medication 216 GRAM(S): at 14:39

## 2021-11-10 RX ADMIN — Medication 6 MILLIGRAM(S): at 22:43

## 2021-11-10 RX ADMIN — HEPARIN SODIUM 5000 UNIT(S): 5000 INJECTION INTRAVENOUS; SUBCUTANEOUS at 05:42

## 2021-11-10 RX ADMIN — PIPERACILLIN AND TAZOBACTAM 25 GRAM(S): 4; .5 INJECTION, POWDER, LYOPHILIZED, FOR SOLUTION INTRAVENOUS at 01:42

## 2021-11-10 RX ADMIN — Medication 1 DROP(S): at 17:50

## 2021-11-10 RX ADMIN — Medication 40 MILLIGRAM(S): at 05:37

## 2021-11-10 RX ADMIN — DORZOLAMIDE HYDROCHLORIDE TIMOLOL MALEATE 1 DROP(S): 20; 5 SOLUTION/ DROPS OPHTHALMIC at 17:48

## 2021-11-10 RX ADMIN — Medication 25 MILLIGRAM(S): at 05:37

## 2021-11-10 RX ADMIN — PIPERACILLIN AND TAZOBACTAM 25 GRAM(S): 4; .5 INJECTION, POWDER, LYOPHILIZED, FOR SOLUTION INTRAVENOUS at 09:30

## 2021-11-10 RX ADMIN — CEFTRIAXONE 100 MILLIGRAM(S): 500 INJECTION, POWDER, FOR SOLUTION INTRAMUSCULAR; INTRAVENOUS at 17:58

## 2021-11-10 NOTE — PROGRESS NOTE ADULT - SUBJECTIVE AND OBJECTIVE BOX
[facility] - Milo Thomas, Adolfo, Binta, Brittanie, Javier Don  Office Number:  [phone]    Patient resting comfortably in bed in NAD.  Laying flat with no respiratory distress.  No complaints of chest pain, dyspnea, palpitations, PND, or orthopnea.  s/p CORBIN.  tolerated well.  no evidence of endocarditis    F/U for:  CHF    Telemetry:  SR/SB.  No VT    MEDICATIONS  (STANDING):  aMIOdarone    Tablet 200 milliGRAM(s) Oral every 12 hours  chlorhexidine 4% Liquid 1 Application(s) Topical <User Schedule>  dorzolamide 2%/timolol 0.5% Ophthalmic Solution 1 Drop(s) Both EYES two times a day  furosemide    Tablet 40 milliGRAM(s) Oral daily  heparin   Injectable 5000 Unit(s) SubCutaneous every 8 hours  influenza   Vaccine 0.5 milliLiter(s) IntraMuscular once  insulin lispro (ADMELOG) corrective regimen sliding scale   SubCutaneous three times a day before meals  insulin lispro (ADMELOG) corrective regimen sliding scale   SubCutaneous at bedtime  ketorolac 0.5% Ophthalmic Solution 1 Drop(s) Both EYES daily  latanoprost 0.005% Ophthalmic Solution 1 Drop(s) Both EYES at bedtime  lidocaine   4% Patch 1 Patch Transdermal daily  melatonin 6 milliGRAM(s) Oral at bedtime  metoprolol tartrate 25 milliGRAM(s) Oral every 12 hours  pancrelipase  (CREON 36,000 Lipase Units) 2 Capsule(s) Oral three times a day with meals  pantoprazole    Tablet 40 milliGRAM(s) Oral before breakfast  piperacillin/tazobactam IVPB.. 3.375 Gram(s) IV Intermittent every 8 hours  senna 1 Tablet(s) Oral daily    MEDICATIONS  (PRN):  ALPRAZolam 0.25 milliGRAM(s) Oral at bedtime PRN agitation  oxyCODONE    IR 5 milliGRAM(s) Oral every 4 hours PRN Moderate Pain (4 - 6)  oxyCODONE    IR 10 milliGRAM(s) Oral every 4 hours PRN Severe Pain (7 - 10)      Allergies    Dilaudid (Anaphylaxis; Hives)    Intolerances        Vital Signs Last 24 Hrs  T(C): 36.6 (10 Nov 2021 04:40), Max: 36.7 (09 Nov 2021 17:27)  T(F): 97.8 (10 Nov 2021 04:40), Max: 98 (09 Nov 2021 17:27)  HR: 76 (10 Nov 2021 05:32) (72 - 79)  BP: 101/64 (10 Nov 2021 05:32) (100/61 - 111/68)  BP(mean): --  RR: 18 (10 Nov 2021 05:32) (18 - 18)  SpO2: 97% (10 Nov 2021 05:32) (95% - 99%)    I&O's Summary    08 Nov 2021 07:01  -  09 Nov 2021 07:00  --------------------------------------------------------  IN: 1460 mL / OUT: 1874 mL / NET: -414 mL    09 Nov 2021 07:01  -  10 Nov 2021 06:42  --------------------------------------------------------  IN: 360 mL / OUT: 1258 mL / NET: -898 mL        ON EXAM:    Constitutional: NAD, awake    HEENT: Moist Mucous Membranes, Anicteric  Pulmonary: Decreased breath sounds b/l. No rales, crackles or wheeze appreciated.   Cardiovascular: Regular, S1 and S2, No murmurs, rubs, gallops or clicks  Gastrointestinal: Bowel Sounds present, soft,  tender.   Lymph: No peripheral edema. No lymphadenopathy.  Skin: No visible rashes or ulcers. + wound vac  Psych:  Mood & affect appropriate for situation  LABS: All Labs Reviewed:                        10.3   5.43  )-----------( 318      ( 10 Nov 2021 06:26 )             33.4                         10.3   7.09  )-----------( 329      ( 09 Nov 2021 07:23 )             32.8                         9.7    6.02  )-----------( 263      ( 08 Nov 2021 06:07 )             30.3     09 Nov 2021 07:23    136    |  100    |  11     ----------------------------<  99     3.7     |  23     |  0.99   08 Nov 2021 06:07    136    |  101    |  8      ----------------------------<  98     4.0     |  22     |  0.96     Ca    9.2        09 Nov 2021 07:23  Ca    9.2        08 Nov 2021 06:07  Phos  3.9       09 Nov 2021 07:23  Phos  3.1       08 Nov 2021 06:07  Mg     1.8       09 Nov 2021 07:23  Mg     1.9       08 Nov 2021 06:07    TPro  7.1    /  Alb  3.6    /  TBili  0.6    /  DBili  x      /  AST  17     /  ALT  12     /  AlkPhos  94     09 Nov 2021 07:23  TPro  6.7    /  Alb  3.2    /  TBili  0.5    /  DBili  x      /  AST  18     /  ALT  15     /  AlkPhos  86     08 Nov 2021 06:07          Blood Culture: Organism --  Gram Stain Blood -- Gram Stain --  Specimen Source .Blood Blood  Culture-Blood --    Organism --  Gram Stain Blood -- Gram Stain --  Specimen Source .Catheter PICC Tip  Culture-Blood --    Organism --  Gram Stain Blood -- Gram Stain --  Specimen Source .Blood Blood  Culture-Blood --    Organism --  Gram Stain Blood -- Gram Stain --  Specimen Source .Blood Blood  Culture-Blood --    Organism --  Gram Stain Blood -- Gram Stain   polymorphonuclear leukocytes seen  No organisms seen  by cytocentrifuge  Specimen Source .Body Fluid Peritoneal Fluid  Culture-Blood --

## 2021-11-10 NOTE — PROGRESS NOTE ADULT - SUBJECTIVE AND OBJECTIVE BOX
Follow Up:  bacteremia    Interval History: afebrile. no acute events.     REVIEW OF SYSTEMS  [  ] ROS unobtainable because:    [x  ] All other systems negative except as noted below    Constitutional:  [ ] fever [ ] chills  [ ] weight loss  [ ] weakness  Skin:  [ ] rash [ ] phlebitis	  Eyes: [ ] icterus [ ] pain  [ ] discharge	  ENMT: [ ] sore throat  [ ] thrush [ ] ulcers [ ] exudates  Respiratory: [ ] dyspnea [ ] hemoptysis [ ] cough [ ] sputum	  Cardiovascular:  [ ] chest pain [ ] palpitations [ ] edema	  Gastrointestinal:  [ ] nausea [ ] vomiting [ ] diarrhea [ ] constipation [ ] pain	  Genitourinary:  [ ] dysuria [ ] frequency [ ] hematuria [ ] discharge [ ] flank pain  [ ] incontinence  Musculoskeletal:  [ ] myalgias [ ] arthralgias [ ] arthritis  [ ] back pain  Neurological:  [ ] headache [ ] seizures  [ ] confusion/altered mental status    Allergies  Dilaudid (Anaphylaxis; Hives)        ANTIMICROBIALS:  ampicillin  IVPB    ampicillin  IVPB 2 every 4 hours  cefTRIAXone   IVPB 2000 every 12 hours      OTHER MEDS:  MEDICATIONS  (STANDING):  ALPRAZolam 0.25 at bedtime PRN  aMIOdarone    Tablet 200 every 12 hours  furosemide    Tablet 40 daily  heparin   Injectable 5000 every 8 hours  influenza   Vaccine 0.5 once  insulin lispro (ADMELOG) corrective regimen sliding scale  three times a day before meals  insulin lispro (ADMELOG) corrective regimen sliding scale  at bedtime  melatonin 6 at bedtime  metoprolol tartrate 25 every 12 hours  oxyCODONE    IR 5 every 4 hours PRN  oxyCODONE    IR 10 every 4 hours PRN  pancrelipase  (CREON 36,000 Lipase Units) 2 three times a day with meals  pantoprazole    Tablet 40 before breakfast  senna 1 daily      Vital Signs Last 24 Hrs  T(C): 36.9 (10 Nov 2021 20:04), Max: 36.9 (10 Nov 2021 20:04)  T(F): 98.4 (10 Nov 2021 20:04), Max: 98.4 (10 Nov 2021 20:04)  HR: 77 (10 Nov 2021 20:04) (69 - 77)  BP: 112/66 (10 Nov 2021 20:04) (93/58 - 112/66)  BP(mean): --  RR: 17 (10 Nov 2021 20:04) (17 - 18)  SpO2: 99% (10 Nov 2021 20:04) (97% - 100%)    PHYSICAL EXAMINATION:  General: Alert and Awake, NAD  Cardiac: RRR, No M/R/G  Resp: CTAB, No Wh/Rh/Ra  Abdomen: NBS, NT/ND, wound vac over abdominal wound. No HSM, No rigidity or guarding  MSK: Dressing over the RLE foot. No LE edema. No Calf tenderness  : No hernandez  Skin: No rashes or lesions. Skin is warm and dry to the touch.   Neuro: Alert and Awake. CN 2-12 Grossly intact. Moves all four extremities spontaneously.  Psych: Calm, Pleasant, Cooperative                          10.3   5.43  )-----------( 318      ( 10 Nov 2021 06:26 )             33.4       11-10    135  |  103  |  9   ----------------------------<  100<H>  4.0   |  22  |  1.04    Ca    9.3      10 Nov 2021 06:26  Phos  3.3     11-10  Mg     2.1     11-10    TPro  7.5  /  Alb  3.4  /  TBili  0.7  /  DBili  x   /  AST  17  /  ALT  11  /  AlkPhos  92  11-10          MICROBIOLOGY:  v  .Blood Blood  11-07-21   No growth to date.  --  --      .Catheter PICC Tip  11-06-21   No growth  --  --      .Blood Blood  11-06-21   No growth to date.  --  --      .Blood Blood  11-06-21   No growth to date.  --  --      .Body Fluid Peritoneal Fluid  11-05-21   No growth at 5 days  --    polymorphonuclear leukocytes seen  No organisms seen  by cytocentrifuge      .Blood Blood  11-04-21   Growth in aerobic and anaerobic bottles: Enterococcus faecalis  See previous culture [id number]  --    Growth in aerobic and anaerobic bottles: Gram Positive Cocci in Pairs and  Chains      .Blood Blood  11-04-21   Growth in aerobic and anaerobic bottles: Enterococcus faecalis  ***Blood Panel PCR results on this specimen are available  approximately 3 hours after the Gram stain result.***  Gram stain, PCR, and/or culture results may not always  correspond dueto difference in methodologies.  ************************************************************  This PCR assay was performed by multiplex PCR. This  Assay tests for 66 bacterial and resistance gene targets.  Please refer to the [facility] test directory  at [email or web address] for details.  --  Blood Culture PCR  Enterococcus faecalis    RADIOLOGY:    <The imaging below has been reviewed and visualized by me independently. Findings as detailed in report below>    < from: Xray Chest 1 View- PORTABLE-Routine (Xray Chest 1 View- PORTABLE-Routine .) (11.07.21 @ 09:37) >  IMPRESSION:  Trace left pleural effusion.    < end of copied text >

## 2021-11-10 NOTE — PROGRESS NOTE ADULT - ASSESSMENT
80M (from [facility]) with PMH pancreatic cancer (dx 3/2021, chemo) s/p ERCP s/p whipple 3/2021, HTN, HLD, CHF (EF: 35%), CAD s/p stents x2 (~15 years ago), AICD (implanted 2005, extracted and re-implantation 9/2021), TAVR (4/2021), bilateral PE (7/2021) on Xarelto, spinal stenosis, macular degeneration, GERD, BPH, Left THR (x2 revisions), left femur fracture (hardware), Osteomyelitis right foot s/p right hallux amputation 9/2021, MSSA bacteremia, explantation and reimplantation of AICD 10/4/21, PICC Line RUE was on IV antibiotics, recently changed to PO.  Now, he is s/p Whipple 10/27, recovered in SICU, now transferred to the floor. NSVT w/ appropriate shock from AICD on 11/1, transferred to SICU for further management. Now on floor. CT 11/3 with Loculated fluid collection adjacent to the afferent jejunal loop in the right upper quadrant.  s/p IR aspiration /drainage.  Now with VRE bacteremia-high grade     #VRE Bacteremia   source not clear:  PICC, Abdominal Collections, Defibrillator, TAVR?  s/p PICC removal   Abdominal Fluid Collections without growth of organisms (?not superinfected)  CORBIN without obvious vegetations  EP without plans to remove PPM  While no objective evidence of endocarditis given plan for retaining PPM and given presence of prosthetic valves there is high risk to not empirically treating for endocarditis   --Stop Zosyn  --Recommend Ampicillin 2g IV Q4H and Ceftriaxone 2g IV Q12H for 6 weeks from cleared BCx (11/6 ---> 12/17)  --Recommend CBC with Diff and CMP qWeekly while on antimicrobials. Please have results faxed to [phone]  --Patient should follow up with me in the Infectious Diseases Office at [address] [place] [address] in 3-4 weeks time. Office contact number is [phone]    #Abdominal collections  - s/p aspiration  - f/u culture (so far with no growth)  - can continue zosyn    #Foot wound  - No active s/s infection  - monitor clinically    Pancreatic ca  - s/p whipple  - otherwise, per primary team    I will continue to follow. Please feel free to contact me with any further questions.    Sanjay Andres M.D.  [facility] Division of Infectious Disease  8AM-5PM: Pager Number [phone]  After Hours (or if no response): Please contact the Infectious Diseases Office at [phone]     The above assessment and plan were discussed with surgical team  80M (from [facility]) with PMH pancreatic cancer (dx 3/2021, chemo) s/p ERCP s/p whipple 3/2021, HTN, HLD, CHF (EF: 35%), CAD s/p stents x2 (~15 years ago), AICD (implanted 2005, extracted and re-implantation 9/2021), TAVR (4/2021), bilateral PE (7/2021) on Xarelto, spinal stenosis, macular degeneration, GERD, BPH, Left THR (x2 revisions), left femur fracture (hardware), Osteomyelitis right foot s/p right hallux amputation 9/2021, MSSA bacteremia, explantation and reimplantation of AICD 10/4/21, PICC Line RUE was on IV antibiotics, recently changed to PO.  Now, he is s/p Whipple 10/27, recovered in SICU, now transferred to the floor. NSVT w/ appropriate shock from AICD on 11/1, transferred to SICU for further management. Now on floor. CT 11/3 with Loculated fluid collection adjacent to the afferent jejunal loop in the right upper quadrant.  s/p IR aspiration /drainage.  Now with VRE bacteremia-high grade     #VRE Bacteremia   source not clear:  PICC, Abdominal Collections, Defibrillator, TAVR?  s/p PICC removal   Abdominal Fluid Collections without growth of organisms (?not superinfected)  CORBIN without obvious vegetations  EP without plans to remove PPM  While no objective evidence of endocarditis given plan for retaining PPM and given presence of prosthetic valves there is high risk to not empirically treating for endocarditis   --Stop Zosyn  --Recommend Ampicillin 2g IV Q4H and Ceftriaxone 2g IV Q12H for 6 weeks from cleared BCx (11/6 ---> 12/17)  --Recommend CBC with Diff and CMP qWeekly while on antimicrobials. Please have results faxed to [phone]  --Patient should follow up with me in the Infectious Diseases Office at [address] [place] [address] in 3-4 weeks time. Office contact number is [phone]    #Abdominal collections  - s/p aspiration  - No Growth on cultures (?not superinfected)    #Foot wound  - No active s/s infection  - monitor clinically    Pancreatic ca  - s/p whipple  - otherwise, per primary team    I will follow the patient as needed. Please feel free to contact me with any further questions or concerns.    Sanjay Andres M.D.  [facility] Division of Infectious Disease  8AM-5PM: Pager Number [phone]  After Hours (or if no response): Please contact the Infectious Diseases Office at [phone]     The above assessment and plan were discussed with surgical team  80M (from [facility]) with PMH pancreatic cancer (dx 3/2021, chemo) s/p ERCP s/p whipple 3/2021, HTN, HLD, CHF (EF: 35%), CAD s/p stents x2 (~15 years ago), AICD (implanted 2005, extracted and re-implantation 9/2021), TAVR (4/2021), bilateral PE (7/2021) on Xarelto, spinal stenosis, macular degeneration, GERD, BPH, Left THR (x2 revisions), left femur fracture (hardware), Osteomyelitis right foot s/p right hallux amputation 9/2021, MSSA bacteremia, explantation and reimplantation of AICD 10/4/21, PICC Line RUE was on IV antibiotics, recently changed to PO.  Now, he is s/p Whipple 10/27, recovered in SICU, now transferred to the floor. NSVT w/ appropriate shock from AICD on 11/1, transferred to SICU for further management. Now on floor. CT 11/3 with Loculated fluid collection adjacent to the afferent jejunal loop in the right upper quadrant.  s/p IR aspiration /drainage.  Now with VRE bacteremia-high grade     #VRE Bacteremia   source not clear:  PICC, Abdominal Collections, Defibrillator, TAVR?  s/p PICC removal   Abdominal Fluid Collections without growth of organisms (?not superinfected)  CORBIN without obvious vegetations  EP without plans to remove PPM  While there is no objective evidence of endocarditis given plan for retaining PPM and given presence of prosthetic valves there is high risk with not empirically treating for endocarditis. Favor treating for 6 weeks with Ampicillin/Ceftriaxone.   --Stop Zosyn  --Recommend Ampicillin 2g IV Q4H and Ceftriaxone 2g IV Q12H for 6 weeks from cleared BCx (11/6 ---> 12/17)  --Recommend CBC with Diff and CMP qWeekly while on antimicrobials. Please have results faxed to [phone]  --Patient should follow up with me in the Infectious Diseases Office at [address] [place] [address] in 3-4 weeks time. Office contact number is [phone]    #Abdominal collections  - s/p aspiration  - No Growth on cultures (?not superinfected)    #Foot wound  - No active s/s infection  - monitor clinically    Pancreatic ca  - s/p whipple  - otherwise, per primary team    I will follow the patient as needed. Please feel free to contact me with any further questions or concerns.    Sanjay Andres M.D.  [facility] Division of Infectious Disease  8AM-5PM: Pager Number [phone]  After Hours (or if no response): Please contact the Infectious Diseases Office at [phone]     The above assessment and plan were discussed with surgical team

## 2021-11-10 NOTE — PROGRESS NOTE ADULT - ASSESSMENT
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 Patient now s/p Whipple 10/27, recovered in SICU, now transferred to the floor. NSVT w/ appropriate shock from AICD on 11/1, transferred to SICU for further management. Now on floor. CT 11/3 with Loculated fluid collection adjacent to the afferent jejunal loop in the right upper quadrant, now s/p IR drainage 11/5. Blood cx 11/4 w VRE, on daptomycin. Blood cx 11/6 and 11/7 NGTD.     Plan    - VAC change today  - s/p IR drainage 11/5, drain with serous output  - Cw tele monitoring   - Blood cx 11/4: VRE  - F/u Blood cx 11/5 and 11/6: NGTD prelim  - Abx: Zosyn and Daptomycin, vancomycin dc'd given VRE  - PICC removed given bacteremia  - CORBIN: no endocarditis   - PT wound care for vac management   - Lasix 20mg   - DVT PPX; SQH  - EP recommendations appreciated, no evidence of infected AICD at this time  - Cardiology recommendations appreciate, will restart xarelto when safe from our perspective. resume AC on discharge   - trend LFTs (amiodarone)  - TSH (amiodarone)  - PT: TRINA    [id number]  Red Surgery

## 2021-11-10 NOTE — PROGRESS NOTE ADULT - ASSESSMENT
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 He required icd extraction, and a subq icd was replaced. He is now s/p Whipple procedure    - now off of pressor support with improved BP  - 11/1 with sustained vt and subsequent icd shock, possibly in the setting of volume overload  - sotalol stopped, and now on amio load  - trend lfts  - cont metoprolol, with goal to titrate up as tolerated  - no further NSVT    - known history of systolic hf (mod lv dysfunction and mod ms)  - echo 10/27 with moderate ms at HR 80, tavr in place. severe LV dysfunction  - appears more compensated from a hf perspective  - cxr from 11/7 with only trace pleural effusion  - Lasix PO    - Please continue to maintain strict I/Os, monitor daily weights, Cr, and K.   - entresto remains on hold, and will eventually restart it when bp improves    - history of PE in 7/2021, and had been on xarelto.  is at elevated risk of vte noting pancreatic malignancy and relatively recent pe  - resume ac when safe from a surgical perspective. is presently on dvt ppx sq heparin    - now with vre bacteremia  - cont abx per id  - yfn neg for vegetation, but did show thickened MV leaflets.  patient has cleared blood cultures.  would not plan on repeat yfn unless there is a strong clinical suspicion moving forward for endocarditis    - Other cardiovascular workup will depend on clinical course.  - will follow with you

## 2021-11-10 NOTE — PROGRESS NOTE ADULT - SUBJECTIVE AND OBJECTIVE BOX
SURGERY DAILY PROGRESS NOTE:       SUBJECTIVE/ROS: Patient seen and evaluated on AM rounds. Patient feels well. Tolerating a diet. Pain well controlled.   Denies nausea, vomiting, chest pain, shortness of breath.        OBJECTIVE:    Vital Signs Last 24 Hrs  T(C): 36.6 (10 Nov 2021 04:40), Max: 36.7 (2021 17:27)  T(F): 97.8 (10 Nov 2021 04:40), Max: 98 (2021 17:27)  HR: 76 (10 Nov 2021 05:32) (72 - 79)  BP: 101/64 (10 Nov 2021 05:32) (100/61 - 111/68)  BP(mean): --  RR: 18 (10 Nov 2021 05:32) (18 - 18)  SpO2: 97% (10 Nov 2021 05:32) (95% - 99%)  I&O's Detail    2021 07:01  -  10 Nov 2021 07:00  --------------------------------------------------------  IN:    Oral Fluid: 460 mL  Total IN: 460 mL    OUT:    Bulb (mL): 8 mL    Voided (mL): 1500 mL  Total OUT: 1508 mL    Total NET: -1048 mL      10 Nov 2021 07:01  -  10 Nov 2021 10:07  --------------------------------------------------------  IN:    Oral Fluid: 480 mL  Total IN: 480 mL    OUT:    Voided (mL): 450 mL  Total OUT: 450 mL    Total NET: 30 mL        Daily Height in cm: 187.96 (2021 10:56)    Daily Weight in k.1 (10 Nov 2021 07:34)  MEDICATIONS  (STANDING):  aMIOdarone    Tablet 200 milliGRAM(s) Oral every 12 hours  chlorhexidine 4% Liquid 1 Application(s) Topical <User Schedule>  dorzolamide 2%/timolol 0.5% Ophthalmic Solution 1 Drop(s) Both EYES two times a day  furosemide    Tablet 40 milliGRAM(s) Oral daily  heparin   Injectable 5000 Unit(s) SubCutaneous every 8 hours  influenza   Vaccine 0.5 milliLiter(s) IntraMuscular once  insulin lispro (ADMELOG) corrective regimen sliding scale   SubCutaneous three times a day before meals  insulin lispro (ADMELOG) corrective regimen sliding scale   SubCutaneous at bedtime  ketorolac 0.5% Ophthalmic Solution 1 Drop(s) Both EYES daily  latanoprost 0.005% Ophthalmic Solution 1 Drop(s) Both EYES at bedtime  lidocaine   4% Patch 1 Patch Transdermal daily  melatonin 6 milliGRAM(s) Oral at bedtime  metoprolol tartrate 25 milliGRAM(s) Oral every 12 hours  pancrelipase  (CREON 36,000 Lipase Units) 2 Capsule(s) Oral three times a day with meals  pantoprazole    Tablet 40 milliGRAM(s) Oral before breakfast  piperacillin/tazobactam IVPB.. 3.375 Gram(s) IV Intermittent every 8 hours  senna 1 Tablet(s) Oral daily    MEDICATIONS  (PRN):  ALPRAZolam 0.25 milliGRAM(s) Oral at bedtime PRN agitation  oxyCODONE    IR 5 milliGRAM(s) Oral every 4 hours PRN Moderate Pain (4 - 6)  oxyCODONE    IR 10 milliGRAM(s) Oral every 4 hours PRN Severe Pain (7 - 10)      LABS:                        10.3   5.43  )-----------( 318      ( 10 Nov 2021 06:26 )             33.4     11-10    135  |  103  |  9   ----------------------------<  100<H>  4.0   |  22  |  1.04    Ca    9.3      10 Nov 2021 06:26  Phos  3.3     11-10  Mg     2.1     11-10    TPro  7.5  /  Alb  3.4  /  TBili  0.7  /  DBili  x   /  AST  17  /  ALT  11  /  AlkPhos  92  11-10        Physical Exam:  GEN: NAD  RESP: no increased work of breathing  ABD: soft, non distended, mildly tender around incision and drains. Midline incision w VAC holding suction, RUQ IR drain w serous output, MOR w serous output  EXTR: WWP, R foot wound w dressing  NEURO: awake/alert

## 2021-11-11 LAB
ALBUMIN SERPL ELPH-MCNC: 3.3 G/DL — SIGNIFICANT CHANGE UP (ref 3.3–5)
ALP SERPL-CCNC: 84 U/L — SIGNIFICANT CHANGE UP (ref 40–120)
ALT FLD-CCNC: 7 U/L — LOW (ref 10–45)
ANION GAP SERPL CALC-SCNC: 12 MMOL/L — SIGNIFICANT CHANGE UP (ref 5–17)
AST SERPL-CCNC: 16 U/L — SIGNIFICANT CHANGE UP (ref 10–40)
BILIRUB SERPL-MCNC: 0.4 MG/DL — SIGNIFICANT CHANGE UP (ref 0.2–1.2)
BUN SERPL-MCNC: 10 MG/DL — SIGNIFICANT CHANGE UP (ref 7–23)
CALCIUM SERPL-MCNC: 8.9 MG/DL — SIGNIFICANT CHANGE UP (ref 8.4–10.5)
CHLORIDE SERPL-SCNC: 102 MMOL/L — SIGNIFICANT CHANGE UP (ref 96–108)
CO2 SERPL-SCNC: 21 MMOL/L — LOW (ref 22–31)
CREAT SERPL-MCNC: 0.92 MG/DL — SIGNIFICANT CHANGE UP (ref 0.5–1.3)
CULTURE RESULTS: SIGNIFICANT CHANGE UP
CULTURE RESULTS: SIGNIFICANT CHANGE UP
GLUCOSE BLDC GLUCOMTR-MCNC: 100 MG/DL — HIGH (ref 70–99)
GLUCOSE BLDC GLUCOMTR-MCNC: 104 MG/DL — HIGH (ref 70–99)
GLUCOSE BLDC GLUCOMTR-MCNC: 128 MG/DL — HIGH (ref 70–99)
GLUCOSE BLDC GLUCOMTR-MCNC: 133 MG/DL — HIGH (ref 70–99)
GLUCOSE SERPL-MCNC: 118 MG/DL — HIGH (ref 70–99)
HCT VFR BLD CALC: 31.4 % — LOW (ref 39–50)
HGB BLD-MCNC: 9.8 G/DL — LOW (ref 13–17)
MAGNESIUM SERPL-MCNC: 1.8 MG/DL — SIGNIFICANT CHANGE UP (ref 1.6–2.6)
MCHC RBC-ENTMCNC: 30.4 PG — SIGNIFICANT CHANGE UP (ref 27–34)
MCHC RBC-ENTMCNC: 31.2 GM/DL — LOW (ref 32–36)
MCV RBC AUTO: 97.5 FL — SIGNIFICANT CHANGE UP (ref 80–100)
NRBC # BLD: 0 /100 WBCS — SIGNIFICANT CHANGE UP (ref 0–0)
PHOSPHATE SERPL-MCNC: 3.6 MG/DL — SIGNIFICANT CHANGE UP (ref 2.5–4.5)
PLATELET # BLD AUTO: 326 K/UL — SIGNIFICANT CHANGE UP (ref 150–400)
POTASSIUM SERPL-MCNC: 3.7 MMOL/L — SIGNIFICANT CHANGE UP (ref 3.5–5.3)
POTASSIUM SERPL-SCNC: 3.7 MMOL/L — SIGNIFICANT CHANGE UP (ref 3.5–5.3)
PROT SERPL-MCNC: 7.1 G/DL — SIGNIFICANT CHANGE UP (ref 6–8.3)
RBC # BLD: 3.22 M/UL — LOW (ref 4.2–5.8)
RBC # FLD: 14.6 % — HIGH (ref 10.3–14.5)
SODIUM SERPL-SCNC: 135 MMOL/L — SIGNIFICANT CHANGE UP (ref 135–145)
SPECIMEN SOURCE: SIGNIFICANT CHANGE UP
SPECIMEN SOURCE: SIGNIFICANT CHANGE UP
WBC # BLD: 5.99 K/UL — SIGNIFICANT CHANGE UP (ref 3.8–10.5)
WBC # FLD AUTO: 5.99 K/UL — SIGNIFICANT CHANGE UP (ref 3.8–10.5)

## 2021-11-11 PROCEDURE — 71045 X-RAY EXAM CHEST 1 VIEW: CPT | Mod: 26

## 2021-11-11 PROCEDURE — 93010 ELECTROCARDIOGRAM REPORT: CPT

## 2021-11-11 PROCEDURE — 99232 SBSQ HOSP IP/OBS MODERATE 35: CPT

## 2021-11-11 RX ORDER — SODIUM CHLORIDE 9 MG/ML
10 INJECTION INTRAMUSCULAR; INTRAVENOUS; SUBCUTANEOUS
Refills: 0 | Status: DISCONTINUED | OUTPATIENT
Start: 2021-11-11 | End: 2021-11-15

## 2021-11-11 RX ORDER — MAGNESIUM SULFATE 500 MG/ML
2 VIAL (ML) INJECTION ONCE
Refills: 0 | Status: DISCONTINUED | OUTPATIENT
Start: 2021-11-11 | End: 2021-11-11

## 2021-11-11 RX ORDER — POTASSIUM CHLORIDE 20 MEQ
40 PACKET (EA) ORAL ONCE
Refills: 0 | Status: COMPLETED | OUTPATIENT
Start: 2021-11-11 | End: 2021-11-11

## 2021-11-11 RX ORDER — SUCRALFATE 1 G
1 TABLET ORAL
Refills: 0 | Status: DISCONTINUED | OUTPATIENT
Start: 2021-11-11 | End: 2021-11-15

## 2021-11-11 RX ORDER — CHLORHEXIDINE GLUCONATE 213 G/1000ML
1 SOLUTION TOPICAL
Refills: 0 | Status: DISCONTINUED | OUTPATIENT
Start: 2021-11-11 | End: 2021-11-15

## 2021-11-11 RX ORDER — MAGNESIUM SULFATE 500 MG/ML
2 VIAL (ML) INJECTION ONCE
Refills: 0 | Status: COMPLETED | OUTPATIENT
Start: 2021-11-11 | End: 2021-11-11

## 2021-11-11 RX ORDER — METOCLOPRAMIDE HCL 10 MG
5 TABLET ORAL EVERY 6 HOURS
Refills: 0 | Status: DISCONTINUED | OUTPATIENT
Start: 2021-11-11 | End: 2021-11-11

## 2021-11-11 RX ORDER — METOCLOPRAMIDE HCL 10 MG
10 TABLET ORAL THREE TIMES A DAY
Refills: 0 | Status: DISCONTINUED | OUTPATIENT
Start: 2021-11-11 | End: 2021-11-15

## 2021-11-11 RX ADMIN — OXYCODONE HYDROCHLORIDE 10 MILLIGRAM(S): 5 TABLET ORAL at 21:24

## 2021-11-11 RX ADMIN — HEPARIN SODIUM 5000 UNIT(S): 5000 INJECTION INTRAVENOUS; SUBCUTANEOUS at 14:32

## 2021-11-11 RX ADMIN — Medication 216 GRAM(S): at 21:40

## 2021-11-11 RX ADMIN — OXYCODONE HYDROCHLORIDE 10 MILLIGRAM(S): 5 TABLET ORAL at 22:00

## 2021-11-11 RX ADMIN — Medication 2 CAPSULE(S): at 08:32

## 2021-11-11 RX ADMIN — Medication 10 MILLIGRAM(S): at 09:05

## 2021-11-11 RX ADMIN — Medication 10 MILLIGRAM(S): at 21:24

## 2021-11-11 RX ADMIN — Medication 1 GRAM(S): at 17:25

## 2021-11-11 RX ADMIN — AMIODARONE HYDROCHLORIDE 200 MILLIGRAM(S): 400 TABLET ORAL at 17:24

## 2021-11-11 RX ADMIN — CHLORHEXIDINE GLUCONATE 1 APPLICATION(S): 213 SOLUTION TOPICAL at 05:24

## 2021-11-11 RX ADMIN — Medication 40 MILLIGRAM(S): at 05:22

## 2021-11-11 RX ADMIN — DORZOLAMIDE HYDROCHLORIDE TIMOLOL MALEATE 1 DROP(S): 20; 5 SOLUTION/ DROPS OPHTHALMIC at 17:26

## 2021-11-11 RX ADMIN — PANTOPRAZOLE SODIUM 40 MILLIGRAM(S): 20 TABLET, DELAYED RELEASE ORAL at 08:27

## 2021-11-11 RX ADMIN — CEFTRIAXONE 100 MILLIGRAM(S): 500 INJECTION, POWDER, FOR SOLUTION INTRAMUSCULAR; INTRAVENOUS at 18:08

## 2021-11-11 RX ADMIN — CEFTRIAXONE 100 MILLIGRAM(S): 500 INJECTION, POWDER, FOR SOLUTION INTRAMUSCULAR; INTRAVENOUS at 05:21

## 2021-11-11 RX ADMIN — HEPARIN SODIUM 5000 UNIT(S): 5000 INJECTION INTRAVENOUS; SUBCUTANEOUS at 21:23

## 2021-11-11 RX ADMIN — Medication 6 MILLIGRAM(S): at 21:23

## 2021-11-11 RX ADMIN — LIDOCAINE 1 PATCH: 4 CREAM TOPICAL at 08:30

## 2021-11-11 RX ADMIN — Medication 1 GRAM(S): at 05:23

## 2021-11-11 RX ADMIN — HEPARIN SODIUM 5000 UNIT(S): 5000 INJECTION INTRAVENOUS; SUBCUTANEOUS at 05:22

## 2021-11-11 RX ADMIN — Medication 216 GRAM(S): at 05:22

## 2021-11-11 RX ADMIN — OXYCODONE HYDROCHLORIDE 10 MILLIGRAM(S): 5 TABLET ORAL at 17:45

## 2021-11-11 RX ADMIN — Medication 2 CAPSULE(S): at 11:40

## 2021-11-11 RX ADMIN — Medication 1 DROP(S): at 18:22

## 2021-11-11 RX ADMIN — DORZOLAMIDE HYDROCHLORIDE TIMOLOL MALEATE 1 DROP(S): 20; 5 SOLUTION/ DROPS OPHTHALMIC at 06:00

## 2021-11-11 RX ADMIN — OXYCODONE HYDROCHLORIDE 10 MILLIGRAM(S): 5 TABLET ORAL at 17:25

## 2021-11-11 RX ADMIN — Medication 216 GRAM(S): at 17:26

## 2021-11-11 RX ADMIN — OXYCODONE HYDROCHLORIDE 10 MILLIGRAM(S): 5 TABLET ORAL at 00:15

## 2021-11-11 RX ADMIN — Medication 2 CAPSULE(S): at 17:24

## 2021-11-11 RX ADMIN — LIDOCAINE 1 PATCH: 4 CREAM TOPICAL at 21:20

## 2021-11-11 RX ADMIN — Medication 50 GRAM(S): at 11:36

## 2021-11-11 RX ADMIN — Medication 5 MILLIGRAM(S): at 05:22

## 2021-11-11 RX ADMIN — LIDOCAINE 1 PATCH: 4 CREAM TOPICAL at 19:00

## 2021-11-11 RX ADMIN — Medication 216 GRAM(S): at 10:54

## 2021-11-11 RX ADMIN — Medication 25 MILLIGRAM(S): at 17:24

## 2021-11-11 RX ADMIN — Medication 216 GRAM(S): at 00:48

## 2021-11-11 RX ADMIN — Medication 40 MILLIEQUIVALENT(S): at 11:39

## 2021-11-11 NOTE — PROGRESS NOTE ADULT - SUBJECTIVE AND OBJECTIVE BOX
[facility] - Milo Thomas, Adolfo, Binta, Brittanie, Javier Don  Office Number:  [phone]    Patient resting comfortably in bed in NAD.  Laying flat with no respiratory distress.  No complaints of chest pain, dyspnea, palpitations, PND, or orthopnea.  some abd pain, nausea last night, now resolved    ROS: negative unless otherwise mentioned.    Telemetry:  sr, pvcs    MEDICATIONS  (STANDING):  aMIOdarone    Tablet 200 milliGRAM(s) Oral every 12 hours  ampicillin  IVPB      ampicillin  IVPB 2 Gram(s) IV Intermittent every 4 hours  cefTRIAXone   IVPB 2000 milliGRAM(s) IV Intermittent every 12 hours  chlorhexidine 4% Liquid 1 Application(s) Topical <User Schedule>  dorzolamide 2%/timolol 0.5% Ophthalmic Solution 1 Drop(s) Both EYES two times a day  furosemide    Tablet 40 milliGRAM(s) Oral daily  heparin   Injectable 5000 Unit(s) SubCutaneous every 8 hours  influenza   Vaccine 0.5 milliLiter(s) IntraMuscular once  insulin lispro (ADMELOG) corrective regimen sliding scale   SubCutaneous three times a day before meals  insulin lispro (ADMELOG) corrective regimen sliding scale   SubCutaneous at bedtime  ketorolac 0.5% Ophthalmic Solution 1 Drop(s) Both EYES daily  latanoprost 0.005% Ophthalmic Solution 1 Drop(s) Both EYES at bedtime  lidocaine   4% Patch 1 Patch Transdermal daily  melatonin 6 milliGRAM(s) Oral at bedtime  metoclopramide Injectable 10 milliGRAM(s) IV Push three times a day  metoprolol tartrate 25 milliGRAM(s) Oral every 12 hours  pancrelipase  (CREON 36,000 Lipase Units) 2 Capsule(s) Oral three times a day with meals  pantoprazole    Tablet 40 milliGRAM(s) Oral before breakfast  senna 1 Tablet(s) Oral daily  sucralfate 1 Gram(s) Oral two times a day    MEDICATIONS  (PRN):  ALPRAZolam 0.25 milliGRAM(s) Oral at bedtime PRN agitation  metoclopramide 5 milliGRAM(s) Oral every 6 hours PRN nausea  oxyCODONE    IR 5 milliGRAM(s) Oral every 4 hours PRN Moderate Pain (4 - 6)  oxyCODONE    IR 10 milliGRAM(s) Oral every 4 hours PRN Severe Pain (7 - 10)      Allergies    Dilaudid (Anaphylaxis; Hives)    Intolerances        Vital Signs Last 24 Hrs  T(C): 36.9 (11 Nov 2021 04:28), Max: 36.9 (10 Nov 2021 20:04)  T(F): 98.5 (11 Nov 2021 04:28), Max: 98.5 (11 Nov 2021 04:28)  HR: 81 (11 Nov 2021 04:28) (69 - 81)  BP: 95/60 (11 Nov 2021 04:28) (93/58 - 112/66)  BP(mean): --  RR: 18 (11 Nov 2021 04:28) (17 - 18)  SpO2: 97% (11 Nov 2021 04:28) (97% - 100%)    I&O's Summary    10 Nov 2021 07:01  -  11 Nov 2021 07:00  --------------------------------------------------------  IN: 1200 mL / OUT: 1140 mL / NET: 60 mL        ON EXAM:    Constitutional: NAD, awake    HEENT: Moist Mucous Membranes, Anicteric  Pulmonary: Decreased breath sounds b/l. No rales, crackles or wheeze appreciated.   Cardiovascular: Regular, S1 and S2, No murmurs, rubs, gallops or clicks  Gastrointestinal: Bowel Sounds present, soft,  tender.   Lymph: No peripheral edema. No lymphadenopathy.  Skin: No visible rashes or ulcers. + wound vac  Psych:  Mood & affect appropriate for situation    LABS: All Labs Reviewed:                        9.8    5.99  )-----------( 326      ( 11 Nov 2021 06:04 )             31.4                         10.3   5.43  )-----------( 318      ( 10 Nov 2021 06:26 )             33.4                         10.3   7.09  )-----------( 329      ( 09 Nov 2021 07:23 )             32.8     11 Nov 2021 06:04    135    |  102    |  10     ----------------------------<  118    3.7     |  21     |  0.92   10 Nov 2021 06:26    135    |  103    |  9      ----------------------------<  100    4.0     |  22     |  1.04   09 Nov 2021 07:23    136    |  100    |  11     ----------------------------<  99     3.7     |  23     |  0.99     Ca    8.9        11 Nov 2021 06:04  Ca    9.3        10 Nov 2021 06:26  Ca    9.2        09 Nov 2021 07:23  Phos  3.6       11 Nov 2021 06:04  Phos  3.3       10 Nov 2021 06:26  Phos  3.9       09 Nov 2021 07:23  Mg     1.8       11 Nov 2021 06:04  Mg     2.1       10 Nov 2021 06:26  Mg     1.8       09 Nov 2021 07:23    TPro  7.1    /  Alb  3.3    /  TBili  0.4    /  DBili  x      /  AST  16     /  ALT  7      /  AlkPhos  84     11 Nov 2021 06:04  TPro  7.5    /  Alb  3.4    /  TBili  0.7    /  DBili  x      /  AST  17     /  ALT  11     /  AlkPhos  92     10 Nov 2021 06:26  TPro  7.1    /  Alb  3.6    /  TBili  0.6    /  DBili  x      /  AST  17     /  ALT  12     /  AlkPhos  94     09 Nov 2021 07:23          Blood Culture: Organism --  Gram Stain Blood -- Gram Stain --  Specimen Source .Blood Blood  Culture-Blood --    Organism --  Gram Stain Blood -- Gram Stain --  Specimen Source .Catheter PICC Tip  Culture-Blood --    Organism --  Gram Stain Blood -- Gram Stain --  Specimen Source .Blood Blood  Culture-Blood --

## 2021-11-11 NOTE — PROGRESS NOTE ADULT - ASSESSMENT
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 Patient now s/p Whipple 10/27, recovered in SICU, now transferred to the floor. NSVT w/ appropriate shock from AICD on 11/1, transferred to SICU for further management. Now on floor. CT 11/3 with Loculated fluid collection adjacent to the afferent jejunal loop in the right upper quadrant, now s/p IR drainage 11/5. Blood cx 11/4 w VRE, on daptomycin. Blood cx 11/6 and 11/7 NGTD.     Plan    - VAC change tomorrow  - PICC to be readjusted today, placed 11/10 w CXR showing inadequate placement  - UGI w SBFT evaluate delayed gastric emptying  - F/u Cardiology re Lasix upon DC  - Standing reglan 2/2 nausea/vomiting and possible delayed gastric emptying  - s/p IR drainage 11/5, drain with serous output  - Cw tele monitoring   - Blood cx 11/4: VRE  - F/u Blood cx 11/5 and 11/6: NGTD   - Abx: Ampicillin 2g Q24, CTX 2g Q12 for six weeks   - PICC removed given bacteremia  - CORBIN: no endocarditis   - Lasix 20mg   - DVT PPX; SQH  - EP recommendations appreciated, no evidence of infected AICD at this time  - Cardiology recommendations appreciate, will restart xarelto when safe from our perspective. resume AC on discharge   - trend LFTs (amiodarone)  - TSH (amiodarone)  - PT: TRINA    [id number]  [facility]

## 2021-11-11 NOTE — PROGRESS NOTE ADULT - SUBJECTIVE AND OBJECTIVE BOX
24h Events:  -Emesis 3x o/n s/p zofran w symptomatic improvement  -No acute events overnight.      Subjective:   Patient seen at bedside this AM. Denies nausea/vomiting this AM. Pain well controlled. Not eating much d/t decreased appetite. Denies SOB, CP, fevers, chills.     Objective:  Vital Signs  T(C): 36.9 (11-11 @ 04:28), Max: 36.9 (11-10 @ 20:04)  HR: 81 (11-11 @ 04:28) (69 - 81)  BP: 95/60 (11-11 @ 04:28) (93/58 - 112/66)  RR: 18 (11-11 @ 04:28) (17 - 18)  SpO2: 97% (11-11 @ 04:28) (97% - 100%)  11-10-21 @ 07:01  -  11-11-21 @ 07:00  --------------------------------------------------------  IN:  Total IN: 0 mL    OUT:    Voided (mL): 1140 mL  Total OUT: 1140 mL    Total NET: -1140 mL          Physical Exam:  GEN: resting in bed comfortably in NAD  RESP: no increased WOB  ABD: soft, non-distended, appropriately tender around midline laparotomy incision.   EXTR: warm, well-perfused, no edema  NEURO: awake, alert    Labs:                        9.8    5.99  )-----------( 326      ( 11 Nov 2021 06:04 )             31.4   11-11    135  |  102  |  10  ----------------------------<  118<H>  3.7   |  21<L>  |  0.92    Ca    8.9      11 Nov 2021 06:04  Phos  3.6     11-11  Mg     1.8     11-11    TPro  7.1  /  Alb  3.3  /  TBili  0.4  /  DBili  x   /  AST  16  /  ALT  7<L>  /  AlkPhos  84  11-11    CAPILLARY BLOOD GLUCOSE      POCT Blood Glucose.: 104 mg/dL (11 Nov 2021 07:45)  POCT Blood Glucose.: 110 mg/dL (10 Nov 2021 21:30)  POCT Blood Glucose.: 126 mg/dL (10 Nov 2021 16:50)  POCT Blood Glucose.: 145 mg/dL (10 Nov 2021 11:24)      Imaging:

## 2021-11-11 NOTE — PROGRESS NOTE ADULT - ASSESSMENT
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 He required icd extraction, and a subq icd was replaced. He is now s/p Whipple procedure    - 11/1 with sustained vt and subsequent icd shock, possibly in the setting of volume overload  - sotalol stopped, and now on amio load  - trend lfts  - cont metoprolol, with goal to titrate up as tolerated  - no further NSVT    - known history of systolic hf (mod lv dysfunction and mod ms)  - echo 10/27 with moderate ms at HR 80, tavr in place. severe LV dysfunction  - appears more compensated from a hf perspective  - cxr from 11/7 with only trace pleural effusion  - Lasix PO    - Please continue to maintain strict I/Os, monitor daily weights, Cr, and K.   - entresto remains on hold, and will eventually restart it when bp improves    - history of PE in 7/2021, and had been on xarelto.  is at elevated risk of vte noting pancreatic malignancy and relatively recent pe  - resume ac when safe from a surgical perspective. is presently on dvt ppx sq heparin    - now with vre bacteremia  - cont abx per id  - yfn neg for vegetation, but did show thickened MV leaflets.  patient has cleared blood cultures.  would not plan on repeat yfn unless there is a strong clinical suspicion moving forward for endocarditis    - Other cardiovascular workup will depend on clinical course.  - will follow with you

## 2021-11-12 LAB
ALBUMIN SERPL ELPH-MCNC: 3.4 G/DL — SIGNIFICANT CHANGE UP (ref 3.3–5)
ALP SERPL-CCNC: 78 U/L — SIGNIFICANT CHANGE UP (ref 40–120)
ALT FLD-CCNC: 6 U/L — LOW (ref 10–45)
ANION GAP SERPL CALC-SCNC: 13 MMOL/L — SIGNIFICANT CHANGE UP (ref 5–17)
AST SERPL-CCNC: 13 U/L — SIGNIFICANT CHANGE UP (ref 10–40)
BILIRUB SERPL-MCNC: 0.3 MG/DL — SIGNIFICANT CHANGE UP (ref 0.2–1.2)
BUN SERPL-MCNC: 12 MG/DL — SIGNIFICANT CHANGE UP (ref 7–23)
CALCIUM SERPL-MCNC: 8.9 MG/DL — SIGNIFICANT CHANGE UP (ref 8.4–10.5)
CHLORIDE SERPL-SCNC: 102 MMOL/L — SIGNIFICANT CHANGE UP (ref 96–108)
CO2 SERPL-SCNC: 22 MMOL/L — SIGNIFICANT CHANGE UP (ref 22–31)
CREAT SERPL-MCNC: 0.91 MG/DL — SIGNIFICANT CHANGE UP (ref 0.5–1.3)
CULTURE RESULTS: SIGNIFICANT CHANGE UP
CULTURE RESULTS: SIGNIFICANT CHANGE UP
GLUCOSE BLDC GLUCOMTR-MCNC: 113 MG/DL — HIGH (ref 70–99)
GLUCOSE BLDC GLUCOMTR-MCNC: 127 MG/DL — HIGH (ref 70–99)
GLUCOSE BLDC GLUCOMTR-MCNC: 177 MG/DL — HIGH (ref 70–99)
GLUCOSE SERPL-MCNC: 107 MG/DL — HIGH (ref 70–99)
HCT VFR BLD CALC: 30.1 % — LOW (ref 39–50)
HGB BLD-MCNC: 9.6 G/DL — LOW (ref 13–17)
MAGNESIUM SERPL-MCNC: 2 MG/DL — SIGNIFICANT CHANGE UP (ref 1.6–2.6)
MCHC RBC-ENTMCNC: 30.7 PG — SIGNIFICANT CHANGE UP (ref 27–34)
MCHC RBC-ENTMCNC: 31.9 GM/DL — LOW (ref 32–36)
MCV RBC AUTO: 96.2 FL — SIGNIFICANT CHANGE UP (ref 80–100)
NRBC # BLD: 0 /100 WBCS — SIGNIFICANT CHANGE UP (ref 0–0)
PHOSPHATE SERPL-MCNC: 3.6 MG/DL — SIGNIFICANT CHANGE UP (ref 2.5–4.5)
PLATELET # BLD AUTO: 324 K/UL — SIGNIFICANT CHANGE UP (ref 150–400)
POTASSIUM SERPL-MCNC: 3.9 MMOL/L — SIGNIFICANT CHANGE UP (ref 3.5–5.3)
POTASSIUM SERPL-SCNC: 3.9 MMOL/L — SIGNIFICANT CHANGE UP (ref 3.5–5.3)
PROT SERPL-MCNC: 6.8 G/DL — SIGNIFICANT CHANGE UP (ref 6–8.3)
RBC # BLD: 3.13 M/UL — LOW (ref 4.2–5.8)
RBC # FLD: 14.8 % — HIGH (ref 10.3–14.5)
SODIUM SERPL-SCNC: 137 MMOL/L — SIGNIFICANT CHANGE UP (ref 135–145)
SPECIMEN SOURCE: SIGNIFICANT CHANGE UP
SPECIMEN SOURCE: SIGNIFICANT CHANGE UP
WBC # BLD: 6.11 K/UL — SIGNIFICANT CHANGE UP (ref 3.8–10.5)
WBC # FLD AUTO: 6.11 K/UL — SIGNIFICANT CHANGE UP (ref 3.8–10.5)

## 2021-11-12 PROCEDURE — 99232 SBSQ HOSP IP/OBS MODERATE 35: CPT

## 2021-11-12 PROCEDURE — 74240 X-RAY XM UPR GI TRC 1CNTRST: CPT | Mod: 26

## 2021-11-12 PROCEDURE — 74248 X-RAY SM INT F-THRU STD: CPT | Mod: 26

## 2021-11-12 RX ORDER — ALPRAZOLAM 0.25 MG
0.25 TABLET ORAL ONCE
Refills: 0 | Status: DISCONTINUED | OUTPATIENT
Start: 2021-11-12 | End: 2021-11-12

## 2021-11-12 RX ORDER — ONDANSETRON 8 MG/1
4 TABLET, FILM COATED ORAL ONCE
Refills: 0 | Status: COMPLETED | OUTPATIENT
Start: 2021-11-12 | End: 2021-11-12

## 2021-11-12 RX ORDER — POTASSIUM CHLORIDE 20 MEQ
20 PACKET (EA) ORAL ONCE
Refills: 0 | Status: COMPLETED | OUTPATIENT
Start: 2021-11-12 | End: 2021-11-12

## 2021-11-12 RX ADMIN — ONDANSETRON 4 MILLIGRAM(S): 8 TABLET, FILM COATED ORAL at 00:46

## 2021-11-12 RX ADMIN — Medication 1 GRAM(S): at 17:14

## 2021-11-12 RX ADMIN — LATANOPROST 1 DROP(S): 0.05 SOLUTION/ DROPS OPHTHALMIC; TOPICAL at 22:04

## 2021-11-12 RX ADMIN — LIDOCAINE 1 PATCH: 4 CREAM TOPICAL at 20:32

## 2021-11-12 RX ADMIN — LIDOCAINE 1 PATCH: 4 CREAM TOPICAL at 13:48

## 2021-11-12 RX ADMIN — CHLORHEXIDINE GLUCONATE 1 APPLICATION(S): 213 SOLUTION TOPICAL at 06:13

## 2021-11-12 RX ADMIN — CEFTRIAXONE 100 MILLIGRAM(S): 500 INJECTION, POWDER, FOR SOLUTION INTRAMUSCULAR; INTRAVENOUS at 06:11

## 2021-11-12 RX ADMIN — Medication 216 GRAM(S): at 13:12

## 2021-11-12 RX ADMIN — OXYCODONE HYDROCHLORIDE 10 MILLIGRAM(S): 5 TABLET ORAL at 08:12

## 2021-11-12 RX ADMIN — HEPARIN SODIUM 5000 UNIT(S): 5000 INJECTION INTRAVENOUS; SUBCUTANEOUS at 06:12

## 2021-11-12 RX ADMIN — Medication 1 GRAM(S): at 06:12

## 2021-11-12 RX ADMIN — SENNA PLUS 1 TABLET(S): 8.6 TABLET ORAL at 13:14

## 2021-11-12 RX ADMIN — Medication 6 MILLIGRAM(S): at 22:03

## 2021-11-12 RX ADMIN — Medication 216 GRAM(S): at 06:11

## 2021-11-12 RX ADMIN — PANTOPRAZOLE SODIUM 40 MILLIGRAM(S): 20 TABLET, DELAYED RELEASE ORAL at 06:12

## 2021-11-12 RX ADMIN — DORZOLAMIDE HYDROCHLORIDE TIMOLOL MALEATE 1 DROP(S): 20; 5 SOLUTION/ DROPS OPHTHALMIC at 17:34

## 2021-11-12 RX ADMIN — Medication 0.25 MILLIGRAM(S): at 17:13

## 2021-11-12 RX ADMIN — OXYCODONE HYDROCHLORIDE 10 MILLIGRAM(S): 5 TABLET ORAL at 13:14

## 2021-11-12 RX ADMIN — HEPARIN SODIUM 5000 UNIT(S): 5000 INJECTION INTRAVENOUS; SUBCUTANEOUS at 22:02

## 2021-11-12 RX ADMIN — Medication 10 MILLIGRAM(S): at 13:13

## 2021-11-12 RX ADMIN — CHLORHEXIDINE GLUCONATE 1 APPLICATION(S): 213 SOLUTION TOPICAL at 06:50

## 2021-11-12 RX ADMIN — DORZOLAMIDE HYDROCHLORIDE TIMOLOL MALEATE 1 DROP(S): 20; 5 SOLUTION/ DROPS OPHTHALMIC at 06:18

## 2021-11-12 RX ADMIN — CEFTRIAXONE 100 MILLIGRAM(S): 500 INJECTION, POWDER, FOR SOLUTION INTRAMUSCULAR; INTRAVENOUS at 18:24

## 2021-11-12 RX ADMIN — AMIODARONE HYDROCHLORIDE 200 MILLIGRAM(S): 400 TABLET ORAL at 17:14

## 2021-11-12 RX ADMIN — Medication 25 MILLIGRAM(S): at 17:14

## 2021-11-12 RX ADMIN — HEPARIN SODIUM 5000 UNIT(S): 5000 INJECTION INTRAVENOUS; SUBCUTANEOUS at 13:13

## 2021-11-12 RX ADMIN — Medication 216 GRAM(S): at 01:39

## 2021-11-12 RX ADMIN — Medication 1 DROP(S): at 17:14

## 2021-11-12 RX ADMIN — Medication 10 MILLIGRAM(S): at 22:02

## 2021-11-12 RX ADMIN — Medication 0.25 MILLIGRAM(S): at 22:02

## 2021-11-12 RX ADMIN — Medication 20 MILLIEQUIVALENT(S): at 22:02

## 2021-11-12 RX ADMIN — Medication 10 MILLIGRAM(S): at 06:12

## 2021-11-12 RX ADMIN — Medication 216 GRAM(S): at 22:04

## 2021-11-12 RX ADMIN — Medication 216 GRAM(S): at 17:16

## 2021-11-12 NOTE — PROGRESS NOTE ADULT - SUBJECTIVE AND OBJECTIVE BOX
24h Events:  No acute events overnight.     Subjective:   Patient seen at bedside this AM. Denies vomiting, but does endorse nausea overnight despite standing Reglan. Denies chest pain, SOB, fevers, chills. Tolerating diet, but with poor appetite. Passing flatus, without BM in >24hr.  Pain well controlled. Counseled on UGI study today and what to expect.     Objective:  Vital Signs  T(C): 37 (11-12 @ 04:41), Max: 37 (11-12 @ 04:41)  HR: 85 (11-12 @ 04:41) (85 - 91)  BP: 93/53 (11-12 @ 04:41) (93/53 - 111/65)  RR: 18 (11-12 @ 04:41) (18 - 18)  SpO2: 94% (11-12 @ 04:41) (94% - 96%)  11-11-21 @ 07:01  -  11-12-21 @ 07:00  --------------------------------------------------------  IN:  Total IN: 0 mL    OUT:    Voided (mL): 1450 mL  Total OUT: 1450 mL    Total NET: -1450 mL          Physical Exam:  GEN: resting in bed comfortably in NAD  RESP: no increased WOB  ABD: soft, non-distended, appropriately tender around midline incision. VAC holding suction.   EXTR: warm, well-perfused, no edema  NEURO: awake, alert    Labs:                        9.6    6.11  )-----------( 324      ( 12 Nov 2021 06:30 )             30.1   11-12    137  |  102  |  12  ----------------------------<  107<H>  3.9   |  22  |  0.91    Ca    8.9      12 Nov 2021 06:27  Phos  3.6     11-12  Mg     2.0     11-12    TPro  6.8  /  Alb  3.4  /  TBili  0.3  /  DBili  x   /  AST  13  /  ALT  6<L>  /  AlkPhos  78  11-12    CAPILLARY BLOOD GLUCOSE      POCT Blood Glucose.: 127 mg/dL (12 Nov 2021 07:45)  POCT Blood Glucose.: 100 mg/dL (11 Nov 2021 21:17)  POCT Blood Glucose.: 128 mg/dL (11 Nov 2021 16:34)      Imaging:

## 2021-11-12 NOTE — PROGRESS NOTE ADULT - ASSESSMENT
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 Patient now s/p Whipple 10/27, recovered in SICU, now transferred to the floor. NSVT w/ appropriate shock from AICD on 11/1, transferred to SICU for further management. Now on floor. CT 11/3 with Loculated fluid collection adjacent to the afferent jejunal loop in the right upper quadrant, now s/p IR drainage 11/5. Blood cx 11/4 w VRE, on daptomycin. Blood cx 11/6 and 11/7 NGTD.     Plan    - VAC change today  - PICC OK to use  - UGI w SBFT evaluate delayed gastric emptying today  - CW lasix upon DC   - Standing reglan 2/2 nausea/vomiting and possible delayed gastric emptying  - Zofran PRN   - Cw tele monitoring   - Blood cx 11/4: VRE  - F/u Blood cx 11/5 and 11/6: NGTD   - Abx: Ampicillin 2g Q4 and CTX 2g [id number] inpatient, Ampicillin 2g Q24, CTX 2g Q12 for six weeks upon DC  - Lasix 20mg   - DVT PPX; SQH  - Cardiology recommendations appreciate, will restart xarelto when safe from our perspective. resume AC on discharge   - trend LFTs (amiodarone)  - PT: [facility], referral sent    [phone]  [facility]

## 2021-11-12 NOTE — PROGRESS NOTE ADULT - ASSESSMENT
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 He required icd extraction, and a subq icd was replaced. He is now s/p Whipple procedure    - 11/1 with sustained vt and subsequent icd shock, possibly in the setting of volume overload  - sotalol stopped, and now on amio   - trend lfts  - cont metoprolol, with goal to titrate up as tolerated. bp 90s-110s, so would not increase now  - no further NSVT    - known history of systolic hf (mod lv dysfunction and mod ms)  - echo 10/27 with moderate ms at HR 80, tavr in place. severe LV dysfunction  - appears more compensated from a hf perspective  - cxr from 11/7 with only trace pleural effusion  - Lasix PO    - Please continue to maintain strict I/Os, monitor daily weights, Cr, and K.   - entresto remains on hold, and will eventually restart it when bp rises    - history of PE in 7/2021, and had been on xarelto.  is at elevated risk of vte noting pancreatic malignancy and relatively recent pe  - resume ac when safe from a surgical perspective. is presently on dvt ppx sq heparin    - now with vre bacteremia  - cont abx per id  - yfn neg for vegetation, but did show thickened MV leaflets.  patient has cleared blood cultures.  would not plan on repeat yfn unless there is a strong clinical suspicion moving forward for endocarditis    - Other cardiovascular workup will depend on clinical course.  - will follow with you

## 2021-11-12 NOTE — PROGRESS NOTE ADULT - SUBJECTIVE AND OBJECTIVE BOX
[facility]    Milo Thomas, Adolfo, Binta, Brittanie, Arian, Javier      [phone]    CHIEF COMPLAINT: Patient is a 80y old  Male who presents with a chief complaint of whipple (11 Nov 2021 08:57)      Follow Up: vt, vre bacteremia    Interim history: The patient reports no new symptoms.  Denies chest discomfort and shortness of breath.  No abdominal pain.  No new neurologic symptoms.      MEDICATIONS  (STANDING):  aMIOdarone    Tablet 200 milliGRAM(s) Oral every 12 hours  ampicillin  IVPB      ampicillin  IVPB 2 Gram(s) IV Intermittent every 4 hours  cefTRIAXone   IVPB 2000 milliGRAM(s) IV Intermittent every 12 hours  chlorhexidine 4% Liquid 1 Application(s) Topical <User Schedule>  chlorhexidine 4% Liquid 1 Application(s) Topical <User Schedule>  chlorhexidine 4% Liquid 1 Application(s) Topical <User Schedule>  dorzolamide 2%/timolol 0.5% Ophthalmic Solution 1 Drop(s) Both EYES two times a day  furosemide    Tablet 40 milliGRAM(s) Oral daily  heparin   Injectable 5000 Unit(s) SubCutaneous every 8 hours  influenza   Vaccine 0.5 milliLiter(s) IntraMuscular once  insulin lispro (ADMELOG) corrective regimen sliding scale   SubCutaneous three times a day before meals  insulin lispro (ADMELOG) corrective regimen sliding scale   SubCutaneous at bedtime  ketorolac 0.5% Ophthalmic Solution 1 Drop(s) Both EYES daily  latanoprost 0.005% Ophthalmic Solution 1 Drop(s) Both EYES at bedtime  lidocaine   4% Patch 1 Patch Transdermal daily  melatonin 6 milliGRAM(s) Oral at bedtime  metoclopramide Injectable 10 milliGRAM(s) IV Push three times a day  metoprolol tartrate 25 milliGRAM(s) Oral every 12 hours  pancrelipase  (CREON 36,000 Lipase Units) 2 Capsule(s) Oral three times a day with meals  pantoprazole    Tablet 40 milliGRAM(s) Oral before breakfast  senna 1 Tablet(s) Oral daily  sucralfate 1 Gram(s) Oral two times a day    MEDICATIONS  (PRN):  ALPRAZolam 0.25 milliGRAM(s) Oral at bedtime PRN agitation  oxyCODONE    IR 10 milliGRAM(s) Oral every 4 hours PRN Severe Pain (7 - 10)  oxyCODONE    IR 5 milliGRAM(s) Oral every 4 hours PRN Moderate Pain (4 - 6)  sodium chloride 0.9% lock flush 10 milliLiter(s) IV Push every 1 hour PRN Pre/post blood products, medications, blood draw, and to maintain line patency  sodium chloride 0.9% lock flush 10 milliLiter(s) IV Push every 1 hour PRN Pre/post blood products, medications, blood draw, and to maintain line patency      REVIEW OF SYSTEMS:  eye, ent, GI, , allergic, dermatologic, musculoskeletal and neurologic are negative except as described above    Vital Signs Last 24 Hrs  T(C): 37 (12 Nov 2021 04:41), Max: 37 (12 Nov 2021 04:41)  T(F): 98.6 (12 Nov 2021 04:41), Max: 98.6 (12 Nov 2021 04:41)  HR: 85 (12 Nov 2021 04:41) (72 - 91)  BP: 93/53 (12 Nov 2021 04:41) (93/53 - 111/65)  BP(mean): --  RR: 18 (12 Nov 2021 04:41) (18 - 18)  SpO2: 94% (12 Nov 2021 04:41) (94% - 98%)    I&O's Summary    11 Nov 2021 07:01  -  12 Nov 2021 07:00  --------------------------------------------------------  IN: 660 mL / OUT: 1450 mL / NET: -790 mL        Telemetry past 24h: sr    PHYSICAL EXAM:    Constitutional: well-nourished, well-developed, NAD   HEENT:  MMM, sclerae anicteric, conjunctivae clear, no oral cyanosis.  Pulmonary: Non-labored, breath sounds are clear bilaterally, No wheezing, rales or rhonchi  Cardiovascular: Regular, S1 and S2.  No murmur.  No rubs, gallops or clicks  Gastrointestinal: Bowel Sounds present, soft, nontender.   Lymph: No peripheral edema.   Neurological: Alert, no focal deficits  Skin: No rashes.  Psych:  Mood & affect appropriate    LABS: All Labs Reviewed:                        9.6    6.11  )-----------( 324      ( 12 Nov 2021 06:30 )             30.1                         9.8    5.99  )-----------( 326      ( 11 Nov 2021 06:04 )             31.4                         10.3   5.43  )-----------( 318      ( 10 Nov 2021 06:26 )             33.4     12 Nov 2021 06:27    137    |  102    |  12     ----------------------------<  107    3.9     |  22     |  0.91   11 Nov 2021 06:04    135    |  102    |  10     ----------------------------<  118    3.7     |  21     |  0.92   10 Nov 2021 06:26    135    |  103    |  9      ----------------------------<  100    4.0     |  22     |  1.04     Ca    8.9        12 Nov 2021 06:27  Ca    8.9        11 Nov 2021 06:04  Ca    9.3        10 Nov 2021 06:26  Phos  3.6       12 Nov 2021 06:27  Phos  3.6       11 Nov 2021 06:04  Phos  3.3       10 Nov 2021 06:26  Mg     2.0       12 Nov 2021 06:27  Mg     1.8       11 Nov 2021 06:04  Mg     2.1       10 Nov 2021 06:26    TPro  6.8    /  Alb  3.4    /  TBili  0.3    /  DBili  x      /  AST  13     /  ALT  6      /  AlkPhos  78     12 Nov 2021 06:27  TPro  7.1    /  Alb  3.3    /  TBili  0.4    /  DBili  x      /  AST  16     /  ALT  7      /  AlkPhos  84     11 Nov 2021 06:04  TPro  7.5    /  Alb  3.4    /  TBili  0.7    /  DBili  x      /  AST  17     /  ALT  11     /  AlkPhos  92     10 Nov 2021 06:26          Blood Culture:         RADIOLOGY:    EKG:    Echo:

## 2021-11-13 LAB
ALBUMIN SERPL ELPH-MCNC: 3.1 G/DL — LOW (ref 3.3–5)
ALP SERPL-CCNC: 77 U/L — SIGNIFICANT CHANGE UP (ref 40–120)
ALT FLD-CCNC: 6 U/L — LOW (ref 10–45)
ANION GAP SERPL CALC-SCNC: 11 MMOL/L — SIGNIFICANT CHANGE UP (ref 5–17)
AST SERPL-CCNC: 13 U/L — SIGNIFICANT CHANGE UP (ref 10–40)
BILIRUB SERPL-MCNC: 0.4 MG/DL — SIGNIFICANT CHANGE UP (ref 0.2–1.2)
BUN SERPL-MCNC: 12 MG/DL — SIGNIFICANT CHANGE UP (ref 7–23)
CALCIUM SERPL-MCNC: 9 MG/DL — SIGNIFICANT CHANGE UP (ref 8.4–10.5)
CHLORIDE SERPL-SCNC: 101 MMOL/L — SIGNIFICANT CHANGE UP (ref 96–108)
CO2 SERPL-SCNC: 23 MMOL/L — SIGNIFICANT CHANGE UP (ref 22–31)
CREAT SERPL-MCNC: 0.83 MG/DL — SIGNIFICANT CHANGE UP (ref 0.5–1.3)
GLUCOSE BLDC GLUCOMTR-MCNC: 112 MG/DL — HIGH (ref 70–99)
GLUCOSE BLDC GLUCOMTR-MCNC: 124 MG/DL — HIGH (ref 70–99)
GLUCOSE BLDC GLUCOMTR-MCNC: 143 MG/DL — HIGH (ref 70–99)
GLUCOSE BLDC GLUCOMTR-MCNC: 94 MG/DL — SIGNIFICANT CHANGE UP (ref 70–99)
GLUCOSE SERPL-MCNC: 88 MG/DL — SIGNIFICANT CHANGE UP (ref 70–99)
HCT VFR BLD CALC: 28.3 % — LOW (ref 39–50)
HGB BLD-MCNC: 8.7 G/DL — LOW (ref 13–17)
MAGNESIUM SERPL-MCNC: 1.8 MG/DL — SIGNIFICANT CHANGE UP (ref 1.6–2.6)
MCHC RBC-ENTMCNC: 30.7 GM/DL — LOW (ref 32–36)
MCHC RBC-ENTMCNC: 30.7 PG — SIGNIFICANT CHANGE UP (ref 27–34)
MCV RBC AUTO: 100 FL — SIGNIFICANT CHANGE UP (ref 80–100)
NRBC # BLD: 0 /100 WBCS — SIGNIFICANT CHANGE UP (ref 0–0)
PHOSPHATE SERPL-MCNC: 3.5 MG/DL — SIGNIFICANT CHANGE UP (ref 2.5–4.5)
PLATELET # BLD AUTO: 301 K/UL — SIGNIFICANT CHANGE UP (ref 150–400)
POTASSIUM SERPL-MCNC: 3.8 MMOL/L — SIGNIFICANT CHANGE UP (ref 3.5–5.3)
POTASSIUM SERPL-SCNC: 3.8 MMOL/L — SIGNIFICANT CHANGE UP (ref 3.5–5.3)
PROT SERPL-MCNC: 6.6 G/DL — SIGNIFICANT CHANGE UP (ref 6–8.3)
RBC # BLD: 2.83 M/UL — LOW (ref 4.2–5.8)
RBC # FLD: 14.9 % — HIGH (ref 10.3–14.5)
SODIUM SERPL-SCNC: 135 MMOL/L — SIGNIFICANT CHANGE UP (ref 135–145)
WBC # BLD: 4.89 K/UL — SIGNIFICANT CHANGE UP (ref 3.8–10.5)
WBC # FLD AUTO: 4.89 K/UL — SIGNIFICANT CHANGE UP (ref 3.8–10.5)

## 2021-11-13 PROCEDURE — 99232 SBSQ HOSP IP/OBS MODERATE 35: CPT

## 2021-11-13 RX ORDER — ONDANSETRON 8 MG/1
4 TABLET, FILM COATED ORAL ONCE
Refills: 0 | Status: COMPLETED | OUTPATIENT
Start: 2021-11-13 | End: 2021-11-13

## 2021-11-13 RX ORDER — MAGNESIUM SULFATE 500 MG/ML
2 VIAL (ML) INJECTION ONCE
Refills: 0 | Status: COMPLETED | OUTPATIENT
Start: 2021-11-13 | End: 2021-11-13

## 2021-11-13 RX ADMIN — Medication 50 GRAM(S): at 12:24

## 2021-11-13 RX ADMIN — OXYCODONE HYDROCHLORIDE 10 MILLIGRAM(S): 5 TABLET ORAL at 15:42

## 2021-11-13 RX ADMIN — Medication 2 CAPSULE(S): at 09:01

## 2021-11-13 RX ADMIN — OXYCODONE HYDROCHLORIDE 5 MILLIGRAM(S): 5 TABLET ORAL at 19:30

## 2021-11-13 RX ADMIN — CHLORHEXIDINE GLUCONATE 1 APPLICATION(S): 213 SOLUTION TOPICAL at 05:28

## 2021-11-13 RX ADMIN — Medication 2 CAPSULE(S): at 17:16

## 2021-11-13 RX ADMIN — AMIODARONE HYDROCHLORIDE 200 MILLIGRAM(S): 400 TABLET ORAL at 05:27

## 2021-11-13 RX ADMIN — PANTOPRAZOLE SODIUM 40 MILLIGRAM(S): 20 TABLET, DELAYED RELEASE ORAL at 05:26

## 2021-11-13 RX ADMIN — OXYCODONE HYDROCHLORIDE 10 MILLIGRAM(S): 5 TABLET ORAL at 16:40

## 2021-11-13 RX ADMIN — Medication 1 DROP(S): at 17:17

## 2021-11-13 RX ADMIN — ONDANSETRON 4 MILLIGRAM(S): 8 TABLET, FILM COATED ORAL at 19:00

## 2021-11-13 RX ADMIN — LIDOCAINE 1 PATCH: 4 CREAM TOPICAL at 12:26

## 2021-11-13 RX ADMIN — HEPARIN SODIUM 5000 UNIT(S): 5000 INJECTION INTRAVENOUS; SUBCUTANEOUS at 21:32

## 2021-11-13 RX ADMIN — Medication 10 MILLIGRAM(S): at 13:28

## 2021-11-13 RX ADMIN — OXYCODONE HYDROCHLORIDE 10 MILLIGRAM(S): 5 TABLET ORAL at 23:57

## 2021-11-13 RX ADMIN — Medication 216 GRAM(S): at 14:19

## 2021-11-13 RX ADMIN — OXYCODONE HYDROCHLORIDE 5 MILLIGRAM(S): 5 TABLET ORAL at 20:25

## 2021-11-13 RX ADMIN — Medication 1 GRAM(S): at 17:17

## 2021-11-13 RX ADMIN — CEFTRIAXONE 100 MILLIGRAM(S): 500 INJECTION, POWDER, FOR SOLUTION INTRAMUSCULAR; INTRAVENOUS at 17:15

## 2021-11-13 RX ADMIN — OXYCODONE HYDROCHLORIDE 10 MILLIGRAM(S): 5 TABLET ORAL at 22:08

## 2021-11-13 RX ADMIN — LIDOCAINE 1 PATCH: 4 CREAM TOPICAL at 20:09

## 2021-11-13 RX ADMIN — Medication 216 GRAM(S): at 11:26

## 2021-11-13 RX ADMIN — Medication 0.25 MILLIGRAM(S): at 21:32

## 2021-11-13 RX ADMIN — Medication 2 CAPSULE(S): at 12:24

## 2021-11-13 RX ADMIN — Medication 10 MILLIGRAM(S): at 05:27

## 2021-11-13 RX ADMIN — Medication 25 MILLIGRAM(S): at 05:27

## 2021-11-13 RX ADMIN — DORZOLAMIDE HYDROCHLORIDE TIMOLOL MALEATE 1 DROP(S): 20; 5 SOLUTION/ DROPS OPHTHALMIC at 17:16

## 2021-11-13 RX ADMIN — LATANOPROST 1 DROP(S): 0.05 SOLUTION/ DROPS OPHTHALMIC; TOPICAL at 21:33

## 2021-11-13 RX ADMIN — LIDOCAINE 1 PATCH: 4 CREAM TOPICAL at 01:22

## 2021-11-13 RX ADMIN — Medication 25 MILLIGRAM(S): at 17:17

## 2021-11-13 RX ADMIN — Medication 216 GRAM(S): at 02:50

## 2021-11-13 RX ADMIN — Medication 216 GRAM(S): at 17:53

## 2021-11-13 RX ADMIN — Medication 216 GRAM(S): at 06:07

## 2021-11-13 RX ADMIN — CEFTRIAXONE 100 MILLIGRAM(S): 500 INJECTION, POWDER, FOR SOLUTION INTRAMUSCULAR; INTRAVENOUS at 05:26

## 2021-11-13 RX ADMIN — AMIODARONE HYDROCHLORIDE 200 MILLIGRAM(S): 400 TABLET ORAL at 17:16

## 2021-11-13 RX ADMIN — HEPARIN SODIUM 5000 UNIT(S): 5000 INJECTION INTRAVENOUS; SUBCUTANEOUS at 05:27

## 2021-11-13 RX ADMIN — Medication 40 MILLIGRAM(S): at 05:27

## 2021-11-13 RX ADMIN — Medication 10 MILLIGRAM(S): at 21:32

## 2021-11-13 RX ADMIN — DORZOLAMIDE HYDROCHLORIDE TIMOLOL MALEATE 1 DROP(S): 20; 5 SOLUTION/ DROPS OPHTHALMIC at 05:26

## 2021-11-13 RX ADMIN — HEPARIN SODIUM 5000 UNIT(S): 5000 INJECTION INTRAVENOUS; SUBCUTANEOUS at 13:28

## 2021-11-13 RX ADMIN — Medication 216 GRAM(S): at 21:41

## 2021-11-13 RX ADMIN — Medication 1 GRAM(S): at 05:27

## 2021-11-13 RX ADMIN — Medication 6 MILLIGRAM(S): at 21:32

## 2021-11-13 NOTE — PROGRESS NOTE ADULT - ASSESSMENT
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 Patient now s/p Whipple 10/27, recovered in SICU, now transferred to the floor. NSVT w/ appropriate shock from AICD on 11/1, transferred to SICU for further management. Now on floor. CT 11/3 with Loculated fluid collection adjacent to the afferent jejunal loop in the right upper quadrant, now s/p IR drainage 11/5. Blood cx 11/4 w VRE, on daptomycin. Blood cx 11/6 and 11/7 NGTD.     Plan    - VAC change was done yesterday (11/12)  - PICC OK to use  - UGI w SBFT evaluate delayed gastric emptying today  - CW lasix upon DC   - Standing reglan 2/2 nausea/vomiting and possible delayed gastric emptying  - Zofran PRN   - Cw tele monitoring   - Blood cx 11/4: VRE  - F/u Blood cx 11/5 and 11/6: NGTD   - Abx: Ampicillin 2g Q4 and CTX 2g [id number] inpatient, Ampicillin 2g Q24, CTX 2g Q12 for six weeks upon DC  - Lasix 20mg   - DVT PPX; SQH  - Cardiology recommendations appreciate, will restart xarelto when safe from our perspective. resume AC on discharge   - trend LFTs (amiodarone)  - PT: [facility], referral sent    [phone]  [facility]

## 2021-11-13 NOTE — PROGRESS NOTE ADULT - SUBJECTIVE AND OBJECTIVE BOX
24h Events:  No acute events overnight.     Subjective:   Patient seen at bedside this AM. Reports feeling great this morning.  Denies nausea or vomiting. Denies chest pain, SOB, fevers, chills. Tolerating diet, but with poor appetite. Passing flatus, and had BM this AM.    Objective:  Vital Signs  T(C): 37 (11-12 @ 04:41), Max: 37 (11-12 @ 04:41)  HR: 85 (11-12 @ 04:41) (85 - 91)  BP: 93/53 (11-12 @ 04:41) (93/53 - 111/65)  RR: 18 (11-12 @ 04:41) (18 - 18)  SpO2: 94% (11-12 @ 04:41) (94% - 96%)  11-11-21 @ 07:01  -  11-12-21 @ 07:00  --------------------------------------------------------  IN:  Total IN: 0 mL    OUT:    Voided (mL): 1450 mL  Total OUT: 1450 mL    Total NET: -1450 mL          Physical Exam:  GEN: resting in bed comfortably in NAD  RESP: no increased WOB  ABD: soft, non-distended, appropriately tender around midline incision. VAC holding suction.   EXTR: warm, well-perfused, no edema  NEURO: awake, alert    Labs:                        9.6    6.11  )-----------( 324      ( 12 Nov 2021 06:30 )             30.1   11-12    137  |  102  |  12  ----------------------------<  107<H>  3.9   |  22  |  0.91    Ca    8.9      12 Nov 2021 06:27  Phos  3.6     11-12  Mg     2.0     11-12    TPro  6.8  /  Alb  3.4  /  TBili  0.3  /  DBili  x   /  AST  13  /  ALT  6<L>  /  AlkPhos  78  11-12    CAPILLARY BLOOD GLUCOSE      POCT Blood Glucose.: 127 mg/dL (12 Nov 2021 07:45)  POCT Blood Glucose.: 100 mg/dL (11 Nov 2021 21:17)  POCT Blood Glucose.: 128 mg/dL (11 Nov 2021 16:34)      Imaging:

## 2021-11-13 NOTE — PROGRESS NOTE ADULT - SUBJECTIVE AND OBJECTIVE BOX
[facility] -- Milo Thomas, Binta Mata, Arian Gu Savella  Office # [phone]      Follow Up:  CHF     Subjective/Observations: Patient seen and examined. Events noted. Resting comfortably in bed. No complaints of chest pain,  or palpitations reported. No signs of orthopnea or PND. Mild dyspnea       REVIEW OF SYSTEMS: All other review of systems is negative unless indicated above    PAST MEDICAL & SURGICAL HISTORY:  HTN (hypertension)    HLD (hyperlipidemia)    GERD (gastroesophageal reflux disease)    Cardiomyopathy    MSSA bacteremia  9/2021    Acute osteomyelitis    Pulmonary embolism    Pancreas cancer  chemo    [place] (hard of hearing)  B/L hearing aids    History of total hip replacement  left X 1, 2 revisions    History of laminectomy  X3    ICD (implantable cardiac defibrillator) in place  implanted 2005, replaced 10/4/2021 [employer] Subcutaneous ICD    Benign testicular tumor  radiation    Status post amputation of toe of right foot  8/2021    Status post fracture of femur  2017 left with hardware    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    H/O Whipple procedure  2/2021        MEDICATIONS  (STANDING):  aMIOdarone    Tablet 200 milliGRAM(s) Oral every 12 hours  ampicillin  IVPB      ampicillin  IVPB 2 Gram(s) IV Intermittent every 4 hours  cefTRIAXone   IVPB 2000 milliGRAM(s) IV Intermittent every 12 hours  chlorhexidine 4% Liquid 1 Application(s) Topical <User Schedule>  chlorhexidine 4% Liquid 1 Application(s) Topical <User Schedule>  chlorhexidine 4% Liquid 1 Application(s) Topical <User Schedule>  dorzolamide 2%/timolol 0.5% Ophthalmic Solution 1 Drop(s) Both EYES two times a day  furosemide    Tablet 40 milliGRAM(s) Oral daily  heparin   Injectable 5000 Unit(s) SubCutaneous every 8 hours  influenza   Vaccine 0.5 milliLiter(s) IntraMuscular once  insulin lispro (ADMELOG) corrective regimen sliding scale   SubCutaneous three times a day before meals  insulin lispro (ADMELOG) corrective regimen sliding scale   SubCutaneous at bedtime  ketorolac 0.5% Ophthalmic Solution 1 Drop(s) Both EYES daily  latanoprost 0.005% Ophthalmic Solution 1 Drop(s) Both EYES at bedtime  lidocaine   4% Patch 1 Patch Transdermal daily  melatonin 6 milliGRAM(s) Oral at bedtime  metoclopramide Injectable 10 milliGRAM(s) IV Push three times a day  metoprolol tartrate 25 milliGRAM(s) Oral every 12 hours  pancrelipase  (CREON 36,000 Lipase Units) 2 Capsule(s) Oral three times a day with meals  pantoprazole    Tablet 40 milliGRAM(s) Oral before breakfast  senna 1 Tablet(s) Oral daily  sucralfate 1 Gram(s) Oral two times a day    MEDICATIONS  (PRN):  ALPRAZolam 0.25 milliGRAM(s) Oral at bedtime PRN agitation  oxyCODONE    IR 5 milliGRAM(s) Oral every 4 hours PRN Moderate Pain (4 - 6)  oxyCODONE    IR 10 milliGRAM(s) Oral every 4 hours PRN Severe Pain (7 - 10)  sodium chloride 0.9% lock flush 10 milliLiter(s) IV Push every 1 hour PRN Pre/post blood products, medications, blood draw, and to maintain line patency  sodium chloride 0.9% lock flush 10 milliLiter(s) IV Push every 1 hour PRN Pre/post blood products, medications, blood draw, and to maintain line patency      Allergies    Dilaudid (Anaphylaxis; Hives)    Intolerances            Vital Signs Last 24 Hrs  T(C): 36.6 (13 Nov 2021 11:19), Max: 37 (12 Nov 2021 18:20)  T(F): 97.9 (13 Nov 2021 11:19), Max: 98.6 (12 Nov 2021 18:20)  HR: 85 (13 Nov 2021 11:19) (70 - 85)  BP: 107/61 (13 Nov 2021 11:19) (107/61 - 117/69)  BP(mean): --  RR: 18 (13 Nov 2021 11:19) (18 - 18)  SpO2: 97% (13 Nov 2021 11:19) (92% - 97%)    I&O's Summary    12 Nov 2021 07:01  -  13 Nov 2021 07:00  --------------------------------------------------------  IN: 600 mL / OUT: 200 mL / NET: 400 mL          PHYSICAL EXAM:  TELE: SR   Constitutional: NAD, awake and alert, well-developed  HEENT: Moist Mucous Membranes, Anicteric  Pulmonary: Decreased breath sounds b/l. No rales, crackles or wheeze appreciated.   Cardiovascular: Regular, S1 and S2, No murmurs, rubs, gallops or clicks  Gastrointestinal: Bowel Sounds present, soft, nontender.   Lymph: trace peripheral edema. No lymphadenopathy.  Skin: No visible rashes or ulcers.  Psych:  Mood & affect appropriate for situation    LABS: All Labs Reviewed:                        8.7    4.89  )-----------( 301      ( 13 Nov 2021 06:52 )             28.3                         9.6    6.11  )-----------( 324      ( 12 Nov 2021 06:30 )             30.1                         9.8    5.99  )-----------( 326      ( 11 Nov 2021 06:04 )             31.4     13 Nov 2021 06:52    135    |  101    |  12     ----------------------------<  88     3.8     |  23     |  0.83   12 Nov 2021 06:27    137    |  102    |  12     ----------------------------<  107    3.9     |  22     |  0.91   11 Nov 2021 06:04    135    |  102    |  10     ----------------------------<  118    3.7     |  21     |  0.92     Ca    9.0        13 Nov 2021 06:52  Ca    8.9        12 Nov 2021 06:27  Ca    8.9        11 Nov 2021 06:04  Phos  3.5       13 Nov 2021 06:52  Phos  3.6       12 Nov 2021 06:27  Phos  3.6       11 Nov 2021 06:04  Mg     1.8       13 Nov 2021 06:52  Mg     2.0       12 Nov 2021 06:27  Mg     1.8       11 Nov 2021 06:04    TPro  6.6    /  Alb  3.1    /  TBili  0.4    /  DBili  x      /  AST  13     /  ALT  6      /  AlkPhos  77     13 Nov 2021 06:52  TPro  6.8    /  Alb  3.4    /  TBili  0.3    /  DBili  x      /  AST  13     /  ALT  6      /  AlkPhos  78     12 Nov 2021 06:27  TPro  7.1    /  Alb  3.3    /  TBili  0.4    /  DBili  x      /  AST  16     /  ALT  7      /  AlkPhos  84     11 Nov 2021 06:04

## 2021-11-13 NOTE — PROGRESS NOTE ADULT - ASSESSMENT
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 He required icd extraction, and a subq icd was replaced. He is now s/p Whipple procedure    - 11/1 with sustained vt and subsequent icd shock, possibly in the setting of volume overload  - sotalol stopped, and now on amio   - trend lfts  - cont metoprolol, with goal to titrate up as tolerated.  - no further NSVT    - known history of systolic hf (mod lv dysfunction and mod ms)  - echo 10/27 with moderate ms at HR 80, tavr in place. severe LV dysfunction  - appears more compensated from a hf perspective  - cxr from 11/7 with only trace pleural effusion  - Lasix PO    - Please continue to maintain strict I/Os, monitor daily weights, Cr, and K.   - entresto remains on hold, and will eventually restart it when bp rises    - history of PE in 7/2021, and had been on xarelto.  is at elevated risk of vte noting pancreatic malignancy and relatively recent pe  - resume ac when safe from a surgical perspective. is presently on dvt ppx sq heparin    - now with vre bacteremia  - cont abx per id  - yfn neg for vegetation, but did show thickened MV leaflets.  patient has cleared blood cultures.  would not plan on repeat yfn unless there is a strong clinical suspicion moving forward for endocarditis    - Other cardiovascular workup will depend on clinical course.  - will follow with you

## 2021-11-14 LAB
ANION GAP SERPL CALC-SCNC: 12 MMOL/L — SIGNIFICANT CHANGE UP (ref 5–17)
BUN SERPL-MCNC: 9 MG/DL — SIGNIFICANT CHANGE UP (ref 7–23)
CALCIUM SERPL-MCNC: 9.1 MG/DL — SIGNIFICANT CHANGE UP (ref 8.4–10.5)
CHLORIDE SERPL-SCNC: 101 MMOL/L — SIGNIFICANT CHANGE UP (ref 96–108)
CO2 SERPL-SCNC: 23 MMOL/L — SIGNIFICANT CHANGE UP (ref 22–31)
CREAT SERPL-MCNC: 0.85 MG/DL — SIGNIFICANT CHANGE UP (ref 0.5–1.3)
GLUCOSE BLDC GLUCOMTR-MCNC: 100 MG/DL — HIGH (ref 70–99)
GLUCOSE BLDC GLUCOMTR-MCNC: 101 MG/DL — HIGH (ref 70–99)
GLUCOSE BLDC GLUCOMTR-MCNC: 105 MG/DL — HIGH (ref 70–99)
GLUCOSE BLDC GLUCOMTR-MCNC: 108 MG/DL — HIGH (ref 70–99)
GLUCOSE SERPL-MCNC: 107 MG/DL — HIGH (ref 70–99)
HCT VFR BLD CALC: 29.6 % — LOW (ref 39–50)
HGB BLD-MCNC: 9.4 G/DL — LOW (ref 13–17)
MAGNESIUM SERPL-MCNC: 1.9 MG/DL — SIGNIFICANT CHANGE UP (ref 1.6–2.6)
MCHC RBC-ENTMCNC: 31 PG — SIGNIFICANT CHANGE UP (ref 27–34)
MCHC RBC-ENTMCNC: 31.8 GM/DL — LOW (ref 32–36)
MCV RBC AUTO: 97.7 FL — SIGNIFICANT CHANGE UP (ref 80–100)
NRBC # BLD: 0 /100 WBCS — SIGNIFICANT CHANGE UP (ref 0–0)
PHOSPHATE SERPL-MCNC: 3.6 MG/DL — SIGNIFICANT CHANGE UP (ref 2.5–4.5)
PLATELET # BLD AUTO: 288 K/UL — SIGNIFICANT CHANGE UP (ref 150–400)
POTASSIUM SERPL-MCNC: 3.9 MMOL/L — SIGNIFICANT CHANGE UP (ref 3.5–5.3)
POTASSIUM SERPL-SCNC: 3.9 MMOL/L — SIGNIFICANT CHANGE UP (ref 3.5–5.3)
RBC # BLD: 3.03 M/UL — LOW (ref 4.2–5.8)
RBC # FLD: 14.9 % — HIGH (ref 10.3–14.5)
SARS-COV-2 RNA SPEC QL NAA+PROBE: SIGNIFICANT CHANGE UP
SODIUM SERPL-SCNC: 136 MMOL/L — SIGNIFICANT CHANGE UP (ref 135–145)
WBC # BLD: 5.27 K/UL — SIGNIFICANT CHANGE UP (ref 3.8–10.5)
WBC # FLD AUTO: 5.27 K/UL — SIGNIFICANT CHANGE UP (ref 3.8–10.5)

## 2021-11-14 PROCEDURE — 99232 SBSQ HOSP IP/OBS MODERATE 35: CPT

## 2021-11-14 RX ORDER — ALPRAZOLAM 0.25 MG
0.25 TABLET ORAL AT BEDTIME
Refills: 0 | Status: DISCONTINUED | OUTPATIENT
Start: 2021-11-14 | End: 2021-11-15

## 2021-11-14 RX ORDER — POTASSIUM CHLORIDE 20 MEQ
20 PACKET (EA) ORAL ONCE
Refills: 0 | Status: COMPLETED | OUTPATIENT
Start: 2021-11-14 | End: 2021-11-14

## 2021-11-14 RX ORDER — MAGNESIUM SULFATE 500 MG/ML
2 VIAL (ML) INJECTION ONCE
Refills: 0 | Status: COMPLETED | OUTPATIENT
Start: 2021-11-14 | End: 2021-11-14

## 2021-11-14 RX ORDER — ACETAMINOPHEN 500 MG
650 TABLET ORAL EVERY 6 HOURS
Refills: 0 | Status: DISCONTINUED | OUTPATIENT
Start: 2021-11-14 | End: 2021-11-15

## 2021-11-14 RX ADMIN — AMIODARONE HYDROCHLORIDE 200 MILLIGRAM(S): 400 TABLET ORAL at 17:35

## 2021-11-14 RX ADMIN — DORZOLAMIDE HYDROCHLORIDE TIMOLOL MALEATE 1 DROP(S): 20; 5 SOLUTION/ DROPS OPHTHALMIC at 17:29

## 2021-11-14 RX ADMIN — Medication 50 GRAM(S): at 10:56

## 2021-11-14 RX ADMIN — Medication 216 GRAM(S): at 06:10

## 2021-11-14 RX ADMIN — LIDOCAINE 1 PATCH: 4 CREAM TOPICAL at 23:50

## 2021-11-14 RX ADMIN — CHLORHEXIDINE GLUCONATE 1 APPLICATION(S): 213 SOLUTION TOPICAL at 06:13

## 2021-11-14 RX ADMIN — AMIODARONE HYDROCHLORIDE 200 MILLIGRAM(S): 400 TABLET ORAL at 06:11

## 2021-11-14 RX ADMIN — DORZOLAMIDE HYDROCHLORIDE TIMOLOL MALEATE 1 DROP(S): 20; 5 SOLUTION/ DROPS OPHTHALMIC at 06:12

## 2021-11-14 RX ADMIN — Medication 1 GRAM(S): at 06:11

## 2021-11-14 RX ADMIN — Medication 25 MILLIGRAM(S): at 17:30

## 2021-11-14 RX ADMIN — Medication 10 MILLIGRAM(S): at 22:38

## 2021-11-14 RX ADMIN — PANTOPRAZOLE SODIUM 40 MILLIGRAM(S): 20 TABLET, DELAYED RELEASE ORAL at 06:12

## 2021-11-14 RX ADMIN — Medication 25 MILLIGRAM(S): at 06:11

## 2021-11-14 RX ADMIN — Medication 6 MILLIGRAM(S): at 22:40

## 2021-11-14 RX ADMIN — OXYCODONE HYDROCHLORIDE 10 MILLIGRAM(S): 5 TABLET ORAL at 14:50

## 2021-11-14 RX ADMIN — Medication 216 GRAM(S): at 23:42

## 2021-11-14 RX ADMIN — OXYCODONE HYDROCHLORIDE 10 MILLIGRAM(S): 5 TABLET ORAL at 13:52

## 2021-11-14 RX ADMIN — CEFTRIAXONE 100 MILLIGRAM(S): 500 INJECTION, POWDER, FOR SOLUTION INTRAMUSCULAR; INTRAVENOUS at 17:31

## 2021-11-14 RX ADMIN — Medication 2 CAPSULE(S): at 08:49

## 2021-11-14 RX ADMIN — Medication 2 CAPSULE(S): at 13:11

## 2021-11-14 RX ADMIN — Medication 650 MILLIGRAM(S): at 18:27

## 2021-11-14 RX ADMIN — Medication 216 GRAM(S): at 13:12

## 2021-11-14 RX ADMIN — Medication 2 CAPSULE(S): at 17:29

## 2021-11-14 RX ADMIN — Medication 216 GRAM(S): at 10:22

## 2021-11-14 RX ADMIN — LIDOCAINE 1 PATCH: 4 CREAM TOPICAL at 00:03

## 2021-11-14 RX ADMIN — Medication 10 MILLIGRAM(S): at 13:10

## 2021-11-14 RX ADMIN — Medication 0.25 MILLIGRAM(S): at 23:35

## 2021-11-14 RX ADMIN — Medication 216 GRAM(S): at 02:11

## 2021-11-14 RX ADMIN — Medication 40 MILLIGRAM(S): at 06:12

## 2021-11-14 RX ADMIN — OXYCODONE HYDROCHLORIDE 10 MILLIGRAM(S): 5 TABLET ORAL at 22:39

## 2021-11-14 RX ADMIN — Medication 216 GRAM(S): at 18:14

## 2021-11-14 RX ADMIN — LATANOPROST 1 DROP(S): 0.05 SOLUTION/ DROPS OPHTHALMIC; TOPICAL at 23:31

## 2021-11-14 RX ADMIN — HEPARIN SODIUM 5000 UNIT(S): 5000 INJECTION INTRAVENOUS; SUBCUTANEOUS at 13:10

## 2021-11-14 RX ADMIN — CEFTRIAXONE 100 MILLIGRAM(S): 500 INJECTION, POWDER, FOR SOLUTION INTRAMUSCULAR; INTRAVENOUS at 06:44

## 2021-11-14 RX ADMIN — Medication 1 GRAM(S): at 17:30

## 2021-11-14 RX ADMIN — LIDOCAINE 1 PATCH: 4 CREAM TOPICAL at 10:22

## 2021-11-14 RX ADMIN — CHLORHEXIDINE GLUCONATE 1 APPLICATION(S): 213 SOLUTION TOPICAL at 06:12

## 2021-11-14 RX ADMIN — LIDOCAINE 1 PATCH: 4 CREAM TOPICAL at 23:51

## 2021-11-14 RX ADMIN — HEPARIN SODIUM 5000 UNIT(S): 5000 INJECTION INTRAVENOUS; SUBCUTANEOUS at 06:11

## 2021-11-14 RX ADMIN — Medication 20 MILLIEQUIVALENT(S): at 10:22

## 2021-11-14 RX ADMIN — Medication 1 DROP(S): at 17:30

## 2021-11-14 RX ADMIN — Medication 10 MILLIGRAM(S): at 06:13

## 2021-11-14 RX ADMIN — Medication 650 MILLIGRAM(S): at 17:27

## 2021-11-14 RX ADMIN — HEPARIN SODIUM 5000 UNIT(S): 5000 INJECTION INTRAVENOUS; SUBCUTANEOUS at 22:39

## 2021-11-14 NOTE — PROVIDER CONTACT NOTE (OTHER) - RECOMMENDATIONS
EKG?
Labs?
Reschedule lidocaine patch to be placed in 12 hours?
POCUS patient, assess if still dry and needs more fluids. Pressors? Stop PCA pump.
zofran
clarify orders?
notify MD
provider made aware, EKG ordered
Red surgery notify
Red surgery notify

## 2021-11-14 NOTE — PROVIDER CONTACT NOTE (OTHER) - ACTION/TREATMENT ORDERED:
Red surgery team came to see the patient and check the drain/sites and said he will inform the morning team including the IR. Will continue to monitor
RAQUEL Collier aware. Ordered additional 1 L bolus. Zach drip started at 0.5mcg. PCA pump discontinuted. NG tube switched to INT suction and protonix ordered twice a day.
provider made aware, EKG ordered, will continue to monitor
Red surgery made aware, Will continue to monitor
MD notified.
As per MD, MOR drain does not need to be irrigated. MD will discontinue irrigation order.
zofran
Provider ordered continue to monitor.
Provider ordered to reschedule lidocaine patch to be place in 12 hours?
Provider ordered to continue to monitor.

## 2021-11-14 NOTE — PROVIDER CONTACT NOTE (OTHER) - BACKGROUND
malignant neoplasm of pancreas
s/p whipple, and IR with drainage
pt admit for neoplasm of pancreas
s/p whipple, pancreatic cancer, bacteremia
Patient admitted for Malignant neoplasm of pancreas
Patient admitted for malignant neoplasm
patient admitted for Malignant neoplasm of pancreas
s/p whipple, pancreatic cancer, bacteremia
patient admitted for Malignant neoplasm of pancreas
Patient admitted status post Whipple procedure, temporarily on levo but shut off prior to admission to the SICU

## 2021-11-14 NOTE — PROVIDER CONTACT NOTE (OTHER) - DATE AND TIME:
09-Nov-2021 15:00
14-Nov-2021 05:10
27-Oct-2021 03:00
08-Nov-2021 21:46
11-Nov-2021 16:00
08-Nov-2021 01:30
08-Nov-2021 20:50
03-Nov-2021 19:10
08-Nov-2021 03:30
05-Nov-2021 20:24

## 2021-11-14 NOTE — PROVIDER CONTACT NOTE (OTHER) - NAME OF MD/NP/PA/DO NOTIFIED:
RAQUEL Collier
Red surgery DR Holley
red surgery
[place] MAYBERRY
MD Hilda Banerjee
Red Surgery MD, pager [phone]
Red surgery
MD Hilda Banerjee
Red surgery
Red surgery

## 2021-11-14 NOTE — PROGRESS NOTE ADULT - SUBJECTIVE AND OBJECTIVE BOX
24h Events:  No acute events overnight.     Subjective:   Patient seen at bedside this AM.      Objective:  Vital Signs  T(C): 37 (11-12 @ 04:41), Max: 37 (11-12 @ 04:41)  HR: 85 (11-12 @ 04:41) (85 - 91)  BP: 93/53 (11-12 @ 04:41) (93/53 - 111/65)  RR: 18 (11-12 @ 04:41) (18 - 18)  SpO2: 94% (11-12 @ 04:41) (94% - 96%)  11-11-21 @ 07:01  -  11-12-21 @ 07:00  --------------------------------------------------------  IN:  Total IN: 0 mL    OUT:    Voided (mL): 1450 mL  Total OUT: 1450 mL    Total NET: -1450 mL          Physical Exam:  GEN: resting in bed comfortably in NAD  RESP: no increased WOB  ABD: soft, non-distended, appropriately tender around midline incision. VAC holding suction.   EXTR: warm, well-perfused, no edema  NEURO: awake, alert    Labs:                        9.6    6.11  )-----------( 324      ( 12 Nov 2021 06:30 )             30.1   11-12    137  |  102  |  12  ----------------------------<  107<H>  3.9   |  22  |  0.91    Ca    8.9      12 Nov 2021 06:27  Phos  3.6     11-12  Mg     2.0     11-12    TPro  6.8  /  Alb  3.4  /  TBili  0.3  /  DBili  x   /  AST  13  /  ALT  6<L>  /  AlkPhos  78  11-12    CAPILLARY BLOOD GLUCOSE      POCT Blood Glucose.: 127 mg/dL (12 Nov 2021 07:45)  POCT Blood Glucose.: 100 mg/dL (11 Nov 2021 21:17)  POCT Blood Glucose.: 128 mg/dL (11 Nov 2021 16:34)      Imaging:     24h Events:  No acute events overnight.     Subjective:   Patient seen at bedside this AM.  Patient slept poorly last night, however not due to pain.     Objective:  Vital Signs  T(C): 37 (11-12 @ 04:41), Max: 37 (11-12 @ 04:41)  HR: 85 (11-12 @ 04:41) (85 - 91)  BP: 93/53 (11-12 @ 04:41) (93/53 - 111/65)  RR: 18 (11-12 @ 04:41) (18 - 18)  SpO2: 94% (11-12 @ 04:41) (94% - 96%)  11-11-21 @ 07:01  -  11-12-21 @ 07:00  --------------------------------------------------------  IN:  Total IN: 0 mL    OUT:    Voided (mL): 1450 mL  Total OUT: 1450 mL    Total NET: -1450 mL          Physical Exam:  GEN: resting in bed comfortably in NAD  RESP: no increased WOB  ABD: soft, non-distended, appropriately tender around midline incision. VAC holding suction.   EXTR: warm, well-perfused, no edema  NEURO: awake, alert    Labs:                        9.6    6.11  )-----------( 324      ( 12 Nov 2021 06:30 )             30.1   11-12    137  |  102  |  12  ----------------------------<  107<H>  3.9   |  22  |  0.91    Ca    8.9      12 Nov 2021 06:27  Phos  3.6     11-12  Mg     2.0     11-12    TPro  6.8  /  Alb  3.4  /  TBili  0.3  /  DBili  x   /  AST  13  /  ALT  6<L>  /  AlkPhos  78  11-12    CAPILLARY BLOOD GLUCOSE      POCT Blood Glucose.: 127 mg/dL (12 Nov 2021 07:45)  POCT Blood Glucose.: 100 mg/dL (11 Nov 2021 21:17)  POCT Blood Glucose.: 128 mg/dL (11 Nov 2021 16:34)      Imaging:

## 2021-11-14 NOTE — PROGRESS NOTE ADULT - ASSESSMENT
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 Patient now s/p Whipple 10/27, recovered in SICU, now transferred to the floor. NSVT w/ appropriate shock from AICD on 11/1, transferred to SICU for further management. Now on floor. CT 11/3 with Loculated fluid collection adjacent to the afferent jejunal loop in the right upper quadrant, now s/p IR drainage 11/5. Blood cx 11/4 w VRE, on daptomycin. Blood cx 11/6 and 11/7 NGTD.     Plan    - VAC change was done yesterday (11/12)  - PICC OK to use  - UGI w SBFT evaluate delayed gastric emptying today  - CW lasix upon DC   - Standing reglan 2/2 nausea/vomiting and possible delayed gastric emptying  - Zofran PRN   - Cw tele monitoring   - Blood cx 11/4: VRE  - F/u Blood cx 11/5 and 11/6: NGTD   - Abx: Ampicillin 2g Q4 and CTX 2g [id number] inpatient, Ampicillin 2g Q24, CTX 2g Q12 for six weeks upon DC  - Lasix 20mg   - DVT PPX; SQH  - Cardiology recommendations appreciate, will restart xarelto when safe from our perspective. resume AC on discharge   - trend LFTs (amiodarone)  - PT: TRINA, referral sent    [phone]  [facility]          80M w/ PMHx pancreatic cancer (dx 3/2021, s/p neoadjuvant therapy), HTN, HLD, CHF (EF: 35%), CAD s/p stents x2 (~15 years ago), AICD (implanted 2005, extracted and re-implantation 9/2021), TAVR (4/2021), bilateral PE (7/2021) on Xarelto, spinal stenosis, macular degeneration, GERD, BPH, Left THR (x2 revisions), left femur fracture (hardware), 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 Patient now s/p Whipple 10/27, recovered in SICU, now transferred to the floor. NSVT w/ appropriate shock from AICD on 11/1, transferred to SICU for further management. Now on floor. CT 11/3 with Loculated fluid collection adjacent to the afferent jejunal loop in the right upper quadrant, now s/p IR drainage 11/5. Blood cx 11/4 w VRE, on daptomycin. Blood cx 11/6 and 11/7 NGTD.     Plan  - COVID test today   - VAC change was done yesterday (11/12)  - PICC OK to use  - UGI w SBFT evaluate delayed gastric emptying today  - CW lasix upon DC   - Standing reglan 2/2 nausea/vomiting and possible delayed gastric emptying  - Zofran PRN   - Cw tele monitoring   - Blood cx 11/4: VRE  - F/u Blood cx 11/5 and 11/6: NGTD   - Abx: Ampicillin 2g Q4 and CTX 2g [id number] inpatient, Ampicillin 2g Q24, CTX 2g Q12 for six weeks upon DC  - Lasix 20mg   - DVT PPX; SQH  - Cardiology recommendations appreciate, will restart xarelto when safe from our perspective. resume AC on discharge   - trend LFTs (amiodarone)  - PT: TRINA tomorrow    [phone]  [facility]

## 2021-11-14 NOTE — PROVIDER CONTACT NOTE (OTHER) - SITUATION
4 beats of wide complex on tele
Noted with resistant while flushing and less amount of discharges coming out from patient [place] drain at RU abdomen.
patient received from ICU, initial vitals shows temp 100.1 rectal and Afib on tele
patient complained of left armpit pain 5/10, last for a second, non radiating
8 beats of wct
Patient has been itching throughout the night, but has gotten increasingly worse. His pressure blood pressure has dropped as well, despite 1L LR bolus given.
pt c/o nausea
patient's lidocaine patch still in place
pt did not receive dose of ampicillin at 2pm as pt did not have IV access
pt has MOR drain, irrigation order

## 2021-11-14 NOTE — PROGRESS NOTE ADULT - SUBJECTIVE AND OBJECTIVE BOX
[facility] -- Milo Thomas, Adolfo, Brittanie Judd Patel, Savella  Office # [phone]      Follow Up:  CHF    Subjective/Observations: Patient seen and examined. Events noted. Resting comfortably in bed. No complaints of chest pain, dyspnea, or palpitations reported. No signs of orthopnea or PND.       REVIEW OF SYSTEMS: Limited 2/2 comorbidities     PAST MEDICAL & SURGICAL HISTORY:  HTN (hypertension)    HLD (hyperlipidemia)    GERD (gastroesophageal reflux disease)    Cardiomyopathy    MSSA bacteremia  9/2021    Acute osteomyelitis    Pulmonary embolism    Pancreas cancer  chemo    [place] (hard of hearing)  B/L hearing aids    History of total hip replacement  left X 1, 2 revisions    History of laminectomy  X3    ICD (implantable cardiac defibrillator) in place  implanted 2005, replaced 10/4/2021 Inspirato Subcutaneous ICD    Benign testicular tumor  radiation    Status post amputation of toe of right foot  8/2021    Status post fracture of femur  2017 left with hardware    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    H/O Whipple procedure  2/2021        MEDICATIONS  (STANDING):  aMIOdarone    Tablet 200 milliGRAM(s) Oral every 12 hours  ampicillin  IVPB      ampicillin  IVPB 2 Gram(s) IV Intermittent every 4 hours  cefTRIAXone   IVPB 2000 milliGRAM(s) IV Intermittent every 12 hours  chlorhexidine 4% Liquid 1 Application(s) Topical <User Schedule>  chlorhexidine 4% Liquid 1 Application(s) Topical <User Schedule>  chlorhexidine 4% Liquid 1 Application(s) Topical <User Schedule>  dorzolamide 2%/timolol 0.5% Ophthalmic Solution 1 Drop(s) Both EYES two times a day  furosemide    Tablet 40 milliGRAM(s) Oral daily  heparin   Injectable 5000 Unit(s) SubCutaneous every 8 hours  influenza   Vaccine 0.5 milliLiter(s) IntraMuscular once  insulin lispro (ADMELOG) corrective regimen sliding scale   SubCutaneous three times a day before meals  insulin lispro (ADMELOG) corrective regimen sliding scale   SubCutaneous at bedtime  ketorolac 0.5% Ophthalmic Solution 1 Drop(s) Both EYES daily  latanoprost 0.005% Ophthalmic Solution 1 Drop(s) Both EYES at bedtime  lidocaine   4% Patch 1 Patch Transdermal daily  melatonin 6 milliGRAM(s) Oral at bedtime  metoclopramide Injectable 10 milliGRAM(s) IV Push three times a day  metoprolol tartrate 25 milliGRAM(s) Oral every 12 hours  pancrelipase  (CREON 36,000 Lipase Units) 2 Capsule(s) Oral three times a day with meals  pantoprazole    Tablet 40 milliGRAM(s) Oral before breakfast  senna 1 Tablet(s) Oral daily  sucralfate 1 Gram(s) Oral two times a day    MEDICATIONS  (PRN):  oxyCODONE    IR 5 milliGRAM(s) Oral every 4 hours PRN Moderate Pain (4 - 6)  oxyCODONE    IR 10 milliGRAM(s) Oral every 4 hours PRN Severe Pain (7 - 10)  sodium chloride 0.9% lock flush 10 milliLiter(s) IV Push every 1 hour PRN Pre/post blood products, medications, blood draw, and to maintain line patency  sodium chloride 0.9% lock flush 10 milliLiter(s) IV Push every 1 hour PRN Pre/post blood products, medications, blood draw, and to maintain line patency      Allergies    Dilaudid (Anaphylaxis; Hives)    Intolerances            Vital Signs Last 24 Hrs  T(C): 37.1 (14 Nov 2021 11:50), Max: 37.1 (14 Nov 2021 11:50)  T(F): 98.8 (14 Nov 2021 11:50), Max: 98.8 (14 Nov 2021 11:50)  HR: 85 (14 Nov 2021 11:50) (74 - 89)  BP: 107/70 (14 Nov 2021 11:50) (107/62 - 128/69)  BP(mean): --  RR: 18 (14 Nov 2021 11:50) (17 - 18)  SpO2: 99% (14 Nov 2021 11:50) (97% - 99%)    I&O's Summary    13 Nov 2021 07:01  -  14 Nov 2021 07:00  --------------------------------------------------------  IN: 1270 mL / OUT: 901 mL / NET: 369 mL    14 Nov 2021 07:01  -  14 Nov 2021 12:25  --------------------------------------------------------  IN: 240 mL / OUT: 175 mL / NET: 65 mL          PHYSICAL EXAM:  TELE: SR 8 WCT   Constitutional: NAD, awake    HEENT: Moist Mucous Membranes, Anicteric  Pulmonary: Decreased breath sounds b/l. No rales, crackles or wheeze appreciated.   Cardiovascular: Regular, S1 and S2, No murmurs, rubs, gallops or clicks  Gastrointestinal: Bowel Sounds present, soft, nontender.   Lymph: No peripheral edema. No lymphadenopathy.  Skin: No visible rashes or ulcers. + wound vac   Psych:  Mood & affect appropriate for situation    LABS: All Labs Reviewed:                        9.4    5.27  )-----------( 288      ( 14 Nov 2021 07:42 )             29.6                         8.7    4.89  )-----------( 301      ( 13 Nov 2021 06:52 )             28.3                         9.6    6.11  )-----------( 324      ( 12 Nov 2021 06:30 )             30.1     14 Nov 2021 07:42    136    |  101    |  9      ----------------------------<  107    3.9     |  23     |  0.85   13 Nov 2021 06:52    135    |  101    |  12     ----------------------------<  88     3.8     |  23     |  0.83   12 Nov 2021 06:27    137    |  102    |  12     ----------------------------<  107    3.9     |  22     |  0.91     Ca    9.1        14 Nov 2021 07:42  Ca    9.0        13 Nov 2021 06:52  Ca    8.9        12 Nov 2021 06:27  Phos  3.6       14 Nov 2021 07:42  Phos  3.5       13 Nov 2021 06:52  Phos  3.6       12 Nov 2021 06:27  Mg     1.9       14 Nov 2021 07:42  Mg     1.8       13 Nov 2021 06:52  Mg     2.0       12 Nov 2021 06:27    TPro  6.6    /  Alb  3.1    /  TBili  0.4    /  DBili  x      /  AST  13     /  ALT  6      /  AlkPhos  77     13 Nov 2021 06:52  TPro  6.8    /  Alb  3.4    /  TBili  0.3    /  DBili  x      /  AST  13     /  ALT  6      /  AlkPhos  78     12 Nov 2021 06:27

## 2021-11-14 NOTE — PROVIDER CONTACT NOTE (OTHER) - ASSESSMENT
pt has R PICC line access. placement verification xray complete, was awaiting ok to use picc line order in order to access picc line. pt declines new peripheral access placement. pt unable to receive dose of 2pm ampicillin at 3:45pm as it is too close to the next dose as per pharmacist.
VSS, no vomiting,
patient axo 4. Complaints of acute pain or any distress. Denies chest pain, sob.  positive resistant at gravity drain
Patient A&Ox4. Patient's lidocaine patch still in place on lower left back. Vitals stable. Lidocaine patch removed.
Patient A&Ox4. Patient's vitals are stable (see chart). Patient denies chest pain, SOB, palpitation and lightheadedness. Patient now normal sinus rhythm.
Patient A&oX4. Pain last for a second in left armpit. pain is sharp and 5/10. Pain in non radiating. Patient said he experienced pain 3 times today.
Patient sleeping. No complaints of SOB, chest pain and palpation and acute pain from surgical site. VS BP 97/55, Hr- 78, T- 97.6, O2- 95% on RA.
pt has MOR drain @ [facility] abdomen. pt had another drain to gravity removed today by team @ [facility] abdomen. Irrigation orders for drains still active. Irrigation order unclear of which drain to irrigate
temp 100.1 rectal and Afib on tele
Patient on dilaudid PCA pump and complaining of itchiness. Patient pressure dropping as well despite 1L LR bolus. Rash has gotten worse and NG tube has started to put out bloody output.

## 2021-11-14 NOTE — PROGRESS NOTE ADULT - ASSESSMENT
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 He required icd extraction, and a subq icd was replaced. He is now s/p Whipple procedure    - 11/1 with sustained vt and subsequent icd shock, possibly in the setting of volume overload  - sotalol stopped, and now on amio   - trend lfts  - had 8 beats NSVT 11/13 but has been otherwise quiet   - cont metoprolol, with goal to titrate up as tolerated.      - known history of systolic hf (mod lv dysfunction and mod ms)  - echo 10/27 with moderate ms at HR 80, tavr in place. severe LV dysfunction  - appears more compensated from a hf perspective  - cxr from 11/7 with only trace pleural effusion  - Lasix PO    - Please continue to maintain strict I/Os, monitor daily weights, Cr, and K.   - entresto remains on hold, and will eventually restart it when bp rises    - history of PE in 7/2021, and had been on xarelto.  is at elevated risk of vte noting pancreatic malignancy and relatively recent pe  - resume ac when safe from a surgical perspective. is presently on dvt ppx sq heparin    - now with vre bacteremia  - cont abx per id  - yfn neg for vegetation, but did show thickened MV leaflets.  patient has cleared blood cultures.  would not plan on repeat yfn unless there is a strong clinical suspicion moving forward for endocarditis    - Other cardiovascular workup will depend on clinical course.  - will follow with you

## 2021-11-14 NOTE — PROVIDER CONTACT NOTE (OTHER) - REASON
patient's lidocaine patch still in place
pt has MOR drain, irrigation order
Rash/ NG tube output change
pt c/o nausea
4 beats of wide complex on tele
pt did not receive dose of ampicillin at 2pm as pt did not have IV access
8 beats of wct
Noted with resistant and less amount of discharges draining from patient [place] drain at RU abdomen.
patient complained of left armpit pain 5/10, last for a second, non radiating

## 2021-11-15 ENCOUNTER — TRANSCRIPTION ENCOUNTER (OUTPATIENT)
Age: 81
End: 2021-11-15

## 2021-11-15 VITALS
OXYGEN SATURATION: 98 % | DIASTOLIC BLOOD PRESSURE: 76 MMHG | SYSTOLIC BLOOD PRESSURE: 133 MMHG | TEMPERATURE: 98 F | HEART RATE: 79 BPM | RESPIRATION RATE: 18 BRPM

## 2021-11-15 LAB
ANION GAP SERPL CALC-SCNC: 11 MMOL/L — SIGNIFICANT CHANGE UP (ref 5–17)
BUN SERPL-MCNC: 9 MG/DL — SIGNIFICANT CHANGE UP (ref 7–23)
CALCIUM SERPL-MCNC: 9.3 MG/DL — SIGNIFICANT CHANGE UP (ref 8.4–10.5)
CHLORIDE SERPL-SCNC: 101 MMOL/L — SIGNIFICANT CHANGE UP (ref 96–108)
CO2 SERPL-SCNC: 23 MMOL/L — SIGNIFICANT CHANGE UP (ref 22–31)
CREAT SERPL-MCNC: 0.87 MG/DL — SIGNIFICANT CHANGE UP (ref 0.5–1.3)
GLUCOSE BLDC GLUCOMTR-MCNC: 104 MG/DL — HIGH (ref 70–99)
GLUCOSE BLDC GLUCOMTR-MCNC: 114 MG/DL — HIGH (ref 70–99)
GLUCOSE SERPL-MCNC: 105 MG/DL — HIGH (ref 70–99)
HCT VFR BLD CALC: 32.2 % — LOW (ref 39–50)
HGB BLD-MCNC: 10.1 G/DL — LOW (ref 13–17)
MAGNESIUM SERPL-MCNC: 1.9 MG/DL — SIGNIFICANT CHANGE UP (ref 1.6–2.6)
MCHC RBC-ENTMCNC: 30.5 PG — SIGNIFICANT CHANGE UP (ref 27–34)
MCHC RBC-ENTMCNC: 31.4 GM/DL — LOW (ref 32–36)
MCV RBC AUTO: 97.3 FL — SIGNIFICANT CHANGE UP (ref 80–100)
NRBC # BLD: 0 /100 WBCS — SIGNIFICANT CHANGE UP (ref 0–0)
PHOSPHATE SERPL-MCNC: 3.9 MG/DL — SIGNIFICANT CHANGE UP (ref 2.5–4.5)
PLATELET # BLD AUTO: 281 K/UL — SIGNIFICANT CHANGE UP (ref 150–400)
POTASSIUM SERPL-MCNC: 4.3 MMOL/L — SIGNIFICANT CHANGE UP (ref 3.5–5.3)
POTASSIUM SERPL-SCNC: 4.3 MMOL/L — SIGNIFICANT CHANGE UP (ref 3.5–5.3)
RBC # BLD: 3.31 M/UL — LOW (ref 4.2–5.8)
RBC # FLD: 14.8 % — HIGH (ref 10.3–14.5)
SODIUM SERPL-SCNC: 135 MMOL/L — SIGNIFICANT CHANGE UP (ref 135–145)
WBC # BLD: 5.92 K/UL — SIGNIFICANT CHANGE UP (ref 3.8–10.5)
WBC # FLD AUTO: 5.92 K/UL — SIGNIFICANT CHANGE UP (ref 3.8–10.5)

## 2021-11-15 PROCEDURE — 86769 SARS-COV-2 COVID-19 ANTIBODY: CPT

## 2021-11-15 PROCEDURE — 99232 SBSQ HOSP IP/OBS MODERATE 35: CPT

## 2021-11-15 PROCEDURE — 83735 ASSAY OF MAGNESIUM: CPT

## 2021-11-15 PROCEDURE — C9399: CPT

## 2021-11-15 PROCEDURE — 86901 BLOOD TYPING SEROLOGIC RH(D): CPT

## 2021-11-15 PROCEDURE — 81003 URINALYSIS AUTO W/O SCOPE: CPT

## 2021-11-15 PROCEDURE — 88112 CYTOPATH CELL ENHANCE TECH: CPT

## 2021-11-15 PROCEDURE — 82570 ASSAY OF URINE CREATININE: CPT

## 2021-11-15 PROCEDURE — 80053 COMPREHEN METABOLIC PANEL: CPT

## 2021-11-15 PROCEDURE — 89051 BODY FLUID CELL COUNT: CPT

## 2021-11-15 PROCEDURE — 97606 NEG PRS WND THER DME>50 SQCM: CPT

## 2021-11-15 PROCEDURE — 74240 X-RAY XM UPR GI TRC 1CNTRST: CPT

## 2021-11-15 PROCEDURE — 97116 GAIT TRAINING THERAPY: CPT

## 2021-11-15 PROCEDURE — 83605 ASSAY OF LACTIC ACID: CPT

## 2021-11-15 PROCEDURE — 97166 OT EVAL MOD COMPLEX 45 MIN: CPT

## 2021-11-15 PROCEDURE — 76377 3D RENDER W/INTRP POSTPROCES: CPT

## 2021-11-15 PROCEDURE — 88313 SPECIAL STAINS GROUP 2: CPT

## 2021-11-15 PROCEDURE — 71260 CT THORAX DX C+: CPT

## 2021-11-15 PROCEDURE — 82042 OTHER SOURCE ALBUMIN QUAN EA: CPT

## 2021-11-15 PROCEDURE — 70450 CT HEAD/BRAIN W/O DYE: CPT

## 2021-11-15 PROCEDURE — 97605 NEG PRS WND THER DME<=50SQCM: CPT

## 2021-11-15 PROCEDURE — 87186 SC STD MICRODIL/AGAR DIL: CPT

## 2021-11-15 PROCEDURE — 73620 X-RAY EXAM OF FOOT: CPT

## 2021-11-15 PROCEDURE — 84100 ASSAY OF PHOSPHORUS: CPT

## 2021-11-15 PROCEDURE — 87102 FUNGUS ISOLATION CULTURE: CPT

## 2021-11-15 PROCEDURE — U0005: CPT

## 2021-11-15 PROCEDURE — 87205 SMEAR GRAM STAIN: CPT

## 2021-11-15 PROCEDURE — 49406 IMAGE CATH FLUID PERI/RETRO: CPT

## 2021-11-15 PROCEDURE — 80202 ASSAY OF VANCOMYCIN: CPT

## 2021-11-15 PROCEDURE — 84157 ASSAY OF PROTEIN OTHER: CPT

## 2021-11-15 PROCEDURE — 97164 PT RE-EVAL EST PLAN CARE: CPT

## 2021-11-15 PROCEDURE — 80048 BASIC METABOLIC PNL TOTAL CA: CPT

## 2021-11-15 PROCEDURE — 80076 HEPATIC FUNCTION PANEL: CPT

## 2021-11-15 PROCEDURE — 74248 X-RAY SM INT F-THRU STD: CPT

## 2021-11-15 PROCEDURE — 86850 RBC ANTIBODY SCREEN: CPT

## 2021-11-15 PROCEDURE — 85730 THROMBOPLASTIN TIME PARTIAL: CPT

## 2021-11-15 PROCEDURE — 84443 ASSAY THYROID STIM HORMONE: CPT

## 2021-11-15 PROCEDURE — C1889: CPT

## 2021-11-15 PROCEDURE — 82150 ASSAY OF AMYLASE: CPT

## 2021-11-15 PROCEDURE — C1769: CPT

## 2021-11-15 PROCEDURE — 82435 ASSAY OF BLOOD CHLORIDE: CPT

## 2021-11-15 PROCEDURE — 84295 ASSAY OF SERUM SODIUM: CPT

## 2021-11-15 PROCEDURE — 85610 PROTHROMBIN TIME: CPT

## 2021-11-15 PROCEDURE — P9045: CPT

## 2021-11-15 PROCEDURE — 82803 BLOOD GASES ANY COMBINATION: CPT

## 2021-11-15 PROCEDURE — 86900 BLOOD TYPING SEROLOGIC ABO: CPT

## 2021-11-15 PROCEDURE — 93320 DOPPLER ECHO COMPLETE: CPT

## 2021-11-15 PROCEDURE — 82945 GLUCOSE OTHER FLUID: CPT

## 2021-11-15 PROCEDURE — 36415 COLL VENOUS BLD VENIPUNCTURE: CPT

## 2021-11-15 PROCEDURE — 97110 THERAPEUTIC EXERCISES: CPT

## 2021-11-15 PROCEDURE — 85018 HEMOGLOBIN: CPT

## 2021-11-15 PROCEDURE — 97530 THERAPEUTIC ACTIVITIES: CPT

## 2021-11-15 PROCEDURE — C9803: CPT

## 2021-11-15 PROCEDURE — 88341 IMHCHEM/IMCYTCHM EA ADD ANTB: CPT

## 2021-11-15 PROCEDURE — 85027 COMPLETE CBC AUTOMATED: CPT

## 2021-11-15 PROCEDURE — 84132 ASSAY OF SERUM POTASSIUM: CPT

## 2021-11-15 PROCEDURE — 88305 TISSUE EXAM BY PATHOLOGIST: CPT

## 2021-11-15 PROCEDURE — 84484 ASSAY OF TROPONIN QUANT: CPT

## 2021-11-15 PROCEDURE — C1729: CPT

## 2021-11-15 PROCEDURE — 83615 LACTATE (LD) (LDH) ENZYME: CPT

## 2021-11-15 PROCEDURE — 83690 ASSAY OF LIPASE: CPT

## 2021-11-15 PROCEDURE — U0003: CPT

## 2021-11-15 PROCEDURE — 82550 ASSAY OF CK (CPK): CPT

## 2021-11-15 PROCEDURE — 74177 CT ABD & PELVIS W/CONTRAST: CPT

## 2021-11-15 PROCEDURE — 88304 TISSUE EXAM BY PATHOLOGIST: CPT

## 2021-11-15 PROCEDURE — 93306 TTE W/DOPPLER COMPLETE: CPT

## 2021-11-15 PROCEDURE — 71045 X-RAY EXAM CHEST 1 VIEW: CPT

## 2021-11-15 PROCEDURE — 87070 CULTURE OTHR SPECIMN AEROBIC: CPT

## 2021-11-15 PROCEDURE — 36569 INSJ PICC 5 YR+ W/O IMAGING: CPT

## 2021-11-15 PROCEDURE — 86923 COMPATIBILITY TEST ELECTRIC: CPT

## 2021-11-15 PROCEDURE — 82553 CREATINE MB FRACTION: CPT

## 2021-11-15 PROCEDURE — 82330 ASSAY OF CALCIUM: CPT

## 2021-11-15 PROCEDURE — C1751: CPT

## 2021-11-15 PROCEDURE — 97161 PT EVAL LOW COMPLEX 20 MIN: CPT

## 2021-11-15 PROCEDURE — 97112 NEUROMUSCULAR REEDUCATION: CPT

## 2021-11-15 PROCEDURE — 87150 DNA/RNA AMPLIFIED PROBE: CPT

## 2021-11-15 PROCEDURE — 93325 DOPPLER ECHO COLOR FLOW MAPG: CPT

## 2021-11-15 PROCEDURE — 93005 ELECTROCARDIOGRAM TRACING: CPT

## 2021-11-15 PROCEDURE — 85014 HEMATOCRIT: CPT

## 2021-11-15 PROCEDURE — 88342 IMHCHEM/IMCYTCHM 1ST ANTB: CPT

## 2021-11-15 PROCEDURE — 82962 GLUCOSE BLOOD TEST: CPT

## 2021-11-15 PROCEDURE — 84478 ASSAY OF TRIGLYCERIDES: CPT

## 2021-11-15 PROCEDURE — 82565 ASSAY OF CREATININE: CPT

## 2021-11-15 PROCEDURE — 87077 CULTURE AEROBIC IDENTIFY: CPT

## 2021-11-15 PROCEDURE — 82947 ASSAY GLUCOSE BLOOD QUANT: CPT

## 2021-11-15 PROCEDURE — 88309 TISSUE EXAM BY PATHOLOGIST: CPT

## 2021-11-15 PROCEDURE — 93312 ECHO TRANSESOPHAGEAL: CPT

## 2021-11-15 PROCEDURE — 87040 BLOOD CULTURE FOR BACTERIA: CPT

## 2021-11-15 PROCEDURE — 87075 CULTR BACTERIA EXCEPT BLOOD: CPT

## 2021-11-15 PROCEDURE — 85025 COMPLETE CBC W/AUTO DIFF WBC: CPT

## 2021-11-15 RX ORDER — SIMETHICONE 80 MG/1
2 TABLET, CHEWABLE ORAL
Qty: 0 | Refills: 0 | DISCHARGE

## 2021-11-15 RX ORDER — LANOLIN ALCOHOL/MO/W.PET/CERES
2 CREAM (GRAM) TOPICAL
Qty: 0 | Refills: 0 | DISCHARGE
Start: 2021-11-15

## 2021-11-15 RX ORDER — OXYCODONE HYDROCHLORIDE 5 MG/1
1 TABLET ORAL
Qty: 0 | Refills: 0 | DISCHARGE

## 2021-11-15 RX ORDER — SENNA PLUS 8.6 MG/1
1 TABLET ORAL
Qty: 0 | Refills: 0 | DISCHARGE
Start: 2021-11-15

## 2021-11-15 RX ORDER — FUROSEMIDE 40 MG
1 TABLET ORAL
Qty: 0 | Refills: 0 | DISCHARGE
Start: 2021-11-15

## 2021-11-15 RX ORDER — OXYCODONE HYDROCHLORIDE 5 MG/1
1 TABLET ORAL
Qty: 0 | Refills: 0 | DISCHARGE
Start: 2021-11-15

## 2021-11-15 RX ORDER — AMIODARONE HYDROCHLORIDE 400 MG/1
1 TABLET ORAL
Qty: 0 | Refills: 0 | DISCHARGE
Start: 2021-11-15

## 2021-11-15 RX ORDER — KETOROLAC TROMETHAMINE 0.5 %
1 DROPS OPHTHALMIC (EYE)
Qty: 0 | Refills: 0 | DISCHARGE

## 2021-11-15 RX ORDER — DORZOLAMIDE HYDROCHLORIDE TIMOLOL MALEATE 20; 5 MG/ML; MG/ML
1 SOLUTION/ DROPS OPHTHALMIC
Qty: 0 | Refills: 0 | DISCHARGE

## 2021-11-15 RX ORDER — SUCRALFATE 1 G
1 TABLET ORAL
Qty: 0 | Refills: 0 | DISCHARGE
Start: 2021-11-15

## 2021-11-15 RX ORDER — ALPRAZOLAM 0.25 MG
1 TABLET ORAL
Qty: 0 | Refills: 0 | DISCHARGE
Start: 2021-11-15

## 2021-11-15 RX ORDER — LIPASE/PROTEASE/AMYLASE 16-48-48K
2 CAPSULE,DELAYED RELEASE (ENTERIC COATED) ORAL
Qty: 0 | Refills: 0 | DISCHARGE

## 2021-11-15 RX ORDER — SENNA PLUS 8.6 MG/1
2 TABLET ORAL
Qty: 0 | Refills: 0 | DISCHARGE

## 2021-11-15 RX ORDER — DULOXETINE HYDROCHLORIDE 30 MG/1
1 CAPSULE, DELAYED RELEASE ORAL
Qty: 0 | Refills: 0 | DISCHARGE

## 2021-11-15 RX ORDER — SOTALOL HCL 120 MG
0.5 TABLET ORAL
Qty: 0 | Refills: 0 | DISCHARGE

## 2021-11-15 RX ORDER — METOPROLOL TARTRATE 50 MG
1 TABLET ORAL
Qty: 0 | Refills: 0 | DISCHARGE
Start: 2021-11-15

## 2021-11-15 RX ORDER — DORZOLAMIDE HYDROCHLORIDE TIMOLOL MALEATE 20; 5 MG/ML; MG/ML
1 SOLUTION/ DROPS OPHTHALMIC
Qty: 0 | Refills: 0 | DISCHARGE
Start: 2021-11-15

## 2021-11-15 RX ORDER — LIPASE/PROTEASE/AMYLASE 16-48-48K
3 CAPSULE,DELAYED RELEASE (ENTERIC COATED) ORAL
Qty: 0 | Refills: 0 | DISCHARGE
Start: 2021-11-15

## 2021-11-15 RX ORDER — GABAPENTIN 400 MG/1
1 CAPSULE ORAL
Qty: 0 | Refills: 0 | DISCHARGE

## 2021-11-15 RX ORDER — LATANOPROST 0.05 MG/ML
1 SOLUTION/ DROPS OPHTHALMIC; TOPICAL
Qty: 0 | Refills: 0 | DISCHARGE

## 2021-11-15 RX ORDER — LIDOCAINE 4 G/100G
1 CREAM TOPICAL
Qty: 0 | Refills: 0 | DISCHARGE
Start: 2021-11-15

## 2021-11-15 RX ORDER — UBIDECARENONE 100 MG
1 CAPSULE ORAL
Qty: 0 | Refills: 0 | DISCHARGE

## 2021-11-15 RX ORDER — CHLORHEXIDINE GLUCONATE 213 G/1000ML
1 SOLUTION TOPICAL
Qty: 0 | Refills: 0 | DISCHARGE
Start: 2021-11-15

## 2021-11-15 RX ORDER — ALPRAZOLAM 0.25 MG
1 TABLET ORAL
Qty: 0 | Refills: 0 | DISCHARGE

## 2021-11-15 RX ORDER — ACETAMINOPHEN 500 MG
2 TABLET ORAL
Qty: 0 | Refills: 0 | DISCHARGE
Start: 2021-11-15

## 2021-11-15 RX ORDER — CEPHALEXIN 500 MG
1 CAPSULE ORAL
Qty: 0 | Refills: 0 | DISCHARGE

## 2021-11-15 RX ORDER — PANTOPRAZOLE SODIUM 20 MG/1
1 TABLET, DELAYED RELEASE ORAL
Qty: 0 | Refills: 0 | DISCHARGE
Start: 2021-11-15

## 2021-11-15 RX ORDER — KETOROLAC TROMETHAMINE 0.5 %
1 DROPS OPHTHALMIC (EYE)
Qty: 0 | Refills: 0 | DISCHARGE
Start: 2021-11-15

## 2021-11-15 RX ORDER — LATANOPROST 0.05 MG/ML
1 SOLUTION/ DROPS OPHTHALMIC; TOPICAL
Qty: 0 | Refills: 0 | DISCHARGE
Start: 2021-11-15

## 2021-11-15 RX ORDER — LIPASE/PROTEASE/AMYLASE 16-48-48K
2 CAPSULE,DELAYED RELEASE (ENTERIC COATED) ORAL
Qty: 0 | Refills: 0 | DISCHARGE
Start: 2021-11-15

## 2021-11-15 RX ADMIN — LIDOCAINE 1 PATCH: 4 CREAM TOPICAL at 12:28

## 2021-11-15 RX ADMIN — HEPARIN SODIUM 5000 UNIT(S): 5000 INJECTION INTRAVENOUS; SUBCUTANEOUS at 06:03

## 2021-11-15 RX ADMIN — Medication 216 GRAM(S): at 03:32

## 2021-11-15 RX ADMIN — Medication 40 MILLIGRAM(S): at 05:58

## 2021-11-15 RX ADMIN — CEFTRIAXONE 100 MILLIGRAM(S): 500 INJECTION, POWDER, FOR SOLUTION INTRAMUSCULAR; INTRAVENOUS at 05:57

## 2021-11-15 RX ADMIN — Medication 650 MILLIGRAM(S): at 05:57

## 2021-11-15 RX ADMIN — PANTOPRAZOLE SODIUM 40 MILLIGRAM(S): 20 TABLET, DELAYED RELEASE ORAL at 06:18

## 2021-11-15 RX ADMIN — AMIODARONE HYDROCHLORIDE 200 MILLIGRAM(S): 400 TABLET ORAL at 05:59

## 2021-11-15 RX ADMIN — Medication 216 GRAM(S): at 16:00

## 2021-11-15 RX ADMIN — Medication 650 MILLIGRAM(S): at 08:00

## 2021-11-15 RX ADMIN — Medication 2 CAPSULE(S): at 09:41

## 2021-11-15 RX ADMIN — CHLORHEXIDINE GLUCONATE 1 APPLICATION(S): 213 SOLUTION TOPICAL at 06:10

## 2021-11-15 RX ADMIN — Medication 2 CAPSULE(S): at 12:30

## 2021-11-15 RX ADMIN — Medication 10 MILLIGRAM(S): at 06:00

## 2021-11-15 RX ADMIN — Medication 216 GRAM(S): at 12:00

## 2021-11-15 RX ADMIN — HEPARIN SODIUM 5000 UNIT(S): 5000 INJECTION INTRAVENOUS; SUBCUTANEOUS at 14:11

## 2021-11-15 RX ADMIN — Medication 25 MILLIGRAM(S): at 06:04

## 2021-11-15 RX ADMIN — Medication 650 MILLIGRAM(S): at 13:30

## 2021-11-15 RX ADMIN — OXYCODONE HYDROCHLORIDE 10 MILLIGRAM(S): 5 TABLET ORAL at 12:44

## 2021-11-15 RX ADMIN — Medication 650 MILLIGRAM(S): at 12:30

## 2021-11-15 RX ADMIN — DORZOLAMIDE HYDROCHLORIDE TIMOLOL MALEATE 1 DROP(S): 20; 5 SOLUTION/ DROPS OPHTHALMIC at 06:05

## 2021-11-15 RX ADMIN — Medication 1 GRAM(S): at 05:58

## 2021-11-15 RX ADMIN — OXYCODONE HYDROCHLORIDE 10 MILLIGRAM(S): 5 TABLET ORAL at 13:40

## 2021-11-15 RX ADMIN — Medication 10 MILLIGRAM(S): at 14:11

## 2021-11-15 RX ADMIN — OXYCODONE HYDROCHLORIDE 10 MILLIGRAM(S): 5 TABLET ORAL at 00:23

## 2021-11-15 RX ADMIN — Medication 216 GRAM(S): at 08:00

## 2021-11-15 NOTE — PROVIDER CONTACT NOTE (CHANGE IN STATUS NOTIFICATION) - SITUATION
Patient with 8 second periods of sinus bradycardia to the 40s reverting from normal sinus rhythm.  Patient with 5 beat wide complex on monitor as well.

## 2021-11-15 NOTE — PROGRESS NOTE ADULT - SUBJECTIVE AND OBJECTIVE BOX
Surgery Red Team Daily Progress Note   JUANIS DE SANTIAGO | [id number]  --------------------------------------------------------------------------------------------------------------------  24H EVENTS  Patient had brief 5-6 second stretch of SR bradycardia. Patient was asymptomatic but refused to allow staff to obtain EKG/VS ON  SUBJECTIVE   Patient seen and examined on morning rounds. No acute events overnight.  --------------------------------------------------------------------------------------------------------------------  OBJECTIVE:    VITAL SIGNS:  T(C): 37 (11-14-21 @ 20:27), Max: 37.1 (11-14-21 @ 11:50)  HR: 75 (11-14-21 @ 20:27) (75 - 85)  BP: 115/64 (11-14-21 @ 20:27) (107/62 - 127/72)  RR: 18 (11-14-21 @ 20:27) (17 - 18)  SpO2: 95% (11-14-21 @ 20:27) (95% - 99%)  Daily     Daily   POCT Blood Glucose.: 100 mg/dL (11-14-21 @ 21:17)  POCT Blood Glucose.: 105 mg/dL (11-14-21 @ 16:34)  POCT Blood Glucose.: 108 mg/dL (11-14-21 @ 12:28)      Physical Exam:  GEN: resting in bed comfortably in NAD  RESP: no increased WOB  ABD: soft, non-distended, appropriately tender around midline incision. VAC holding suction.   EXTR: warm, well-perfused, no edema  NEURO: awake, alert        11-13-21 @ 07:01  -  11-14-21 @ 07:00  --------------------------------------------------------  IN:    IV PiggyBack: 350 mL    Oral Fluid: 920 mL  Total IN: 1270 mL    OUT:    Bulb (mL): 1 mL    Voided (mL): 900 mL  Total OUT: 901 mL    Total NET: 369 mL      11-14-21 @ 07:01  -  11-15-21 @ 03:57  --------------------------------------------------------  IN:    IV PiggyBack: 400 mL    Oral Fluid: 720 mL  Total IN: 1120 mL    OUT:    Voided (mL): 1340 mL  Total OUT: 1340 mL    Total NET: -220 mL          LAB VALUES:  11-14    136  |  101  |  9   ----------------------------<  107<H>  3.9   |  23  |  0.85    Ca    9.1      14 Nov 2021 07:42  Phos  3.6     11-14  Mg     1.9     11-14    TPro  6.6  /  Alb  3.1<L>  /  TBili  0.4  /  DBili  x   /  AST  13  /  ALT  6<L>  /  AlkPhos  77  11-13                               9.4    5.27  )-----------( 288      ( 14 Nov 2021 07:42 )             29.6     LIVER FUNCTIONS - ( 13 Nov 2021 06:52 )  Alb: 3.1 g/dL / Pro: 6.6 g/dL / ALK PHOS: 77 U/L / ALT: 6 U/L / AST: 13 U/L / GGT: x               MICROBIOLOGY:    No new microbiology data for review.     RADIOLOGY:    No new radiographic images for review.    MEDICATIONS  (STANDING):  acetaminophen     Tablet .. 650 milliGRAM(s) Oral every 6 hours  aMIOdarone    Tablet 200 milliGRAM(s) Oral every 12 hours  ampicillin  IVPB 2 Gram(s) IV Intermittent every 4 hours  ampicillin  IVPB      cefTRIAXone   IVPB 2000 milliGRAM(s) IV Intermittent every 12 hours  chlorhexidine 4% Liquid 1 Application(s) Topical <User Schedule>  chlorhexidine 4% Liquid 1 Application(s) Topical <User Schedule>  chlorhexidine 4% Liquid 1 Application(s) Topical <User Schedule>  dorzolamide 2%/timolol 0.5% Ophthalmic Solution 1 Drop(s) Both EYES two times a day  furosemide    Tablet 40 milliGRAM(s) Oral daily  heparin   Injectable 5000 Unit(s) SubCutaneous every 8 hours  influenza   Vaccine 0.5 milliLiter(s) IntraMuscular once  insulin lispro (ADMELOG) corrective regimen sliding scale   SubCutaneous three times a day before meals  insulin lispro (ADMELOG) corrective regimen sliding scale   SubCutaneous at bedtime  ketorolac 0.5% Ophthalmic Solution 1 Drop(s) Both EYES daily  latanoprost 0.005% Ophthalmic Solution 1 Drop(s) Both EYES at bedtime  lidocaine   4% Patch 1 Patch Transdermal daily  melatonin 6 milliGRAM(s) Oral at bedtime  metoclopramide Injectable 10 milliGRAM(s) IV Push three times a day  metoprolol tartrate 25 milliGRAM(s) Oral every 12 hours  pancrelipase  (CREON 36,000 Lipase Units) 2 Capsule(s) Oral three times a day with meals  pantoprazole    Tablet 40 milliGRAM(s) Oral before breakfast  senna 1 Tablet(s) Oral daily  sucralfate 1 Gram(s) Oral two times a day    MEDICATIONS  (PRN):  ALPRAZolam 0.25 milliGRAM(s) Oral at bedtime PRN agitation  oxyCODONE    IR 5 milliGRAM(s) Oral every 4 hours PRN Moderate Pain (4 - 6)  oxyCODONE    IR 10 milliGRAM(s) Oral every 4 hours PRN Severe Pain (7 - 10)  sodium chloride 0.9% lock flush 10 milliLiter(s) IV Push every 1 hour PRN Pre/post blood products, medications, blood draw, and to maintain line patency  sodium chloride 0.9% lock flush 10 milliLiter(s) IV Push every 1 hour PRN Pre/post blood products, medications, blood draw, and to maintain line patency

## 2021-11-15 NOTE — PROGRESS NOTE ADULT - SUBJECTIVE AND OBJECTIVE BOX
Follow Up:  bacteremia    Interval History: afebrile. continued nausea which limits PO intake.     REVIEW OF SYSTEMS  [  ] ROS unobtainable because:    [x  ] All other systems negative except as noted below    Constitutional:  [ ] fever [ ] chills  [ ] weight loss  [ ] weakness  Skin:  [ ] rash [ ] phlebitis	  Eyes: [ ] icterus [ ] pain  [ ] discharge	  ENMT: [ ] sore throat  [ ] thrush [ ] ulcers [ ] exudates  Respiratory: [ ] dyspnea [ ] hemoptysis [ ] cough [ ] sputum	  Cardiovascular:  [ ] chest pain [ ] palpitations [ ] edema	  Gastrointestinal:  [ ] nausea [ ] vomiting [ ] diarrhea [ ] constipation [ ] pain	  Genitourinary:  [ ] dysuria [ ] frequency [ ] hematuria [ ] discharge [ ] flank pain  [ ] incontinence  Musculoskeletal:  [ ] myalgias [ ] arthralgias [ ] arthritis  [ ] back pain  Neurological:  [ ] headache [ ] seizures  [ ] confusion/altered mental status    Allergies  Dilaudid (Anaphylaxis; Hives)        ANTIMICROBIALS:  ampicillin  IVPB    ampicillin  IVPB 2 every 4 hours  cefTRIAXone   IVPB 2000 every 12 hours      OTHER MEDS:  MEDICATIONS  (STANDING):  acetaminophen     Tablet .. 650 every 6 hours  ALPRAZolam 0.25 at bedtime PRN  aMIOdarone    Tablet 200 every 12 hours  furosemide    Tablet 40 daily  heparin   Injectable 5000 every 8 hours  influenza   Vaccine 0.5 once  insulin lispro (ADMELOG) corrective regimen sliding scale  three times a day before meals  insulin lispro (ADMELOG) corrective regimen sliding scale  at bedtime  melatonin 6 at bedtime  metoclopramide Injectable 10 three times a day  metoprolol tartrate 25 every 12 hours  oxyCODONE    IR 5 every 4 hours PRN  oxyCODONE    IR 10 every 4 hours PRN  pancrelipase  (CREON 36,000 Lipase Units) 2 three times a day with meals  pantoprazole    Tablet 40 before breakfast  senna 1 daily  sucralfate 1 two times a day      Vital Signs Last 24 Hrs  T(C): 36.7 (15 Nov 2021 12:08), Max: 37 (14 Nov 2021 20:27)  T(F): 98 (15 Nov 2021 12:08), Max: 98.6 (14 Nov 2021 20:27)  HR: 79 (15 Nov 2021 12:08) (75 - 79)  BP: 133/76 (15 Nov 2021 12:08) (103/59 - 133/76)  BP(mean): --  RR: 18 (15 Nov 2021 12:08) (18 - 18)  SpO2: 98% (15 Nov 2021 12:08) (95% - 98%)    PHYSICAL EXAMINATION:  General: Alert and Awake, NAD  Cardiac: RRR, No M/R/G  Resp: CTAB, No Wh/Rh/Ra  Abdomen: NBS, NT/ND, wound vac over abdominal wound. No HSM, No rigidity or guarding  MSK: Dressing over the RLE foot. No LE edema. No Calf tenderness  : No hernandez  Skin: No rashes or lesions. Skin is warm and dry to the touch.   Neuro: Alert and Awake. CN 2-12 Grossly intact. Moves all four extremities spontaneously.  Psych: Calm, Pleasant, Cooperative                        10.1   5.92  )-----------( 281      ( 15 Nov 2021 07:56 )             32.2       11-15    135  |  101  |  9   ----------------------------<  105<H>  4.3   |  23  |  0.87    Ca    9.3      15 Nov 2021 07:54  Phos  3.9     11-15  Mg     1.9     11-15            MICROBIOLOGY:  v  .Blood Blood  11-07-21   No Growth Final  --  --      .Catheter PICC Tip  11-06-21   No growth  --  --      .Blood Blood  11-06-21   No Growth Final  --  --      .Blood Blood  11-06-21   No Growth Final  --  --      .Body Fluid Peritoneal Fluid  11-05-21   No growth at 5 days  --    polymorphonuclear leukocytes seen  No organisms seen  by cytocentrifuge      .Blood Blood  11-04-21   Growth in aerobic and anaerobic bottles: Enterococcus faecalis  See previous culture [id number]  --    Growth in aerobic and anaerobic bottles: Gram Positive Cocci in Pairs and  Chains      .Blood Blood  11-04-21   Growth in aerobic and anaerobic bottles: Enterococcus faecalis  ***Blood Panel PCR results on this specimen are available  approximately 3 hours after the Gram stain result.***  Gram stain, PCR, and/or culture results may not always  correspond dueto difference in methodologies.  ************************************************************  This PCR assay was performed by multiplex PCR. This  Assay tests for 66 bacterial and resistance gene targets.  Please refer to the [facility] test directory  at [email or web address] for details.  --  Blood Culture PCR  Enterococcus faecalis    RADIOLOGY:    <The imaging below has been reviewed and visualized by me independently. Findings as detailed in report below>    < from: Xray Small Intestine Follow Through, Add On (11.12.21 @ 11:57) >  IMPRESSION:  No evidence of postoperative leak.    < end of copied text >

## 2021-11-15 NOTE — PROGRESS NOTE ADULT - ASSESSMENT
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 He required icd extraction, and a subq icd was replaced. He is now s/p Whipple procedure    - 11/1 with sustained vt and subsequent icd shock, possibly in the setting of volume overload  - sotalol stopped, and now on amio   - trend lfts  - had 8 beats NSVT 11/14 but has been otherwise quiet   - cont metoprolol, with goal to titrate up as tolerated.    - known history of systolic hf (mod lv dysfunction and mod ms)  - echo 10/27 with moderate ms at HR 80, tavr in place. severe LV dysfunction  - appears more compensated from a hf perspective  - cxr from 11/7 with only trace pleural effusion  - Lasix PO    - Please continue to maintain strict I/Os, monitor daily weights, Cr, and K.   - entresto remains on hold, and will eventually restart it when bp rises    - history of PE in 7/2021, and had been on xarelto.  is at elevated risk of vte noting pancreatic malignancy and relatively recent pe  - resume ac when safe from a surgical perspective. is presently on dvt ppx sq heparin    - now with vre bacteremia  - cont abx per id  - yfn neg for vegetation, but did show thickened MV leaflets.  patient has cleared blood cultures.  would not plan on repeat yfn unless there is a strong clinical suspicion moving forward for endocarditis    - Other cardiovascular workup will depend on clinical course.  - will follow with you

## 2021-11-15 NOTE — PROGRESS NOTE ADULT - SUBJECTIVE AND OBJECTIVE BOX
[facility] - Milo Thomas, Adolfo, Binta, Brittanie, Javier Don  Office Number:  [phone]    Patient resting comfortably in bed in NAD.  Laying flat with no respiratory distress.  No complaints of chest pain, dyspnea, palpitations, PND, or orthopnea.    ROS: negative unless otherwise mentioned.    Telemetry:  sb 50-80, 8 nsvt    MEDICATIONS  (STANDING):  acetaminophen     Tablet .. 650 milliGRAM(s) Oral every 6 hours  aMIOdarone    Tablet 200 milliGRAM(s) Oral every 12 hours  ampicillin  IVPB 2 Gram(s) IV Intermittent every 4 hours  ampicillin  IVPB      cefTRIAXone   IVPB 2000 milliGRAM(s) IV Intermittent every 12 hours  chlorhexidine 4% Liquid 1 Application(s) Topical <User Schedule>  chlorhexidine 4% Liquid 1 Application(s) Topical <User Schedule>  chlorhexidine 4% Liquid 1 Application(s) Topical <User Schedule>  dorzolamide 2%/timolol 0.5% Ophthalmic Solution 1 Drop(s) Both EYES two times a day  furosemide    Tablet 40 milliGRAM(s) Oral daily  heparin   Injectable 5000 Unit(s) SubCutaneous every 8 hours  influenza   Vaccine 0.5 milliLiter(s) IntraMuscular once  insulin lispro (ADMELOG) corrective regimen sliding scale   SubCutaneous three times a day before meals  insulin lispro (ADMELOG) corrective regimen sliding scale   SubCutaneous at bedtime  ketorolac 0.5% Ophthalmic Solution 1 Drop(s) Both EYES daily  latanoprost 0.005% Ophthalmic Solution 1 Drop(s) Both EYES at bedtime  lidocaine   4% Patch 1 Patch Transdermal daily  melatonin 6 milliGRAM(s) Oral at bedtime  metoclopramide Injectable 10 milliGRAM(s) IV Push three times a day  metoprolol tartrate 25 milliGRAM(s) Oral every 12 hours  pancrelipase  (CREON 36,000 Lipase Units) 2 Capsule(s) Oral three times a day with meals  pantoprazole    Tablet 40 milliGRAM(s) Oral before breakfast  senna 1 Tablet(s) Oral daily  sucralfate 1 Gram(s) Oral two times a day    MEDICATIONS  (PRN):  ALPRAZolam 0.25 milliGRAM(s) Oral at bedtime PRN agitation  oxyCODONE    IR 5 milliGRAM(s) Oral every 4 hours PRN Moderate Pain (4 - 6)  oxyCODONE    IR 10 milliGRAM(s) Oral every 4 hours PRN Severe Pain (7 - 10)  sodium chloride 0.9% lock flush 10 milliLiter(s) IV Push every 1 hour PRN Pre/post blood products, medications, blood draw, and to maintain line patency  sodium chloride 0.9% lock flush 10 milliLiter(s) IV Push every 1 hour PRN Pre/post blood products, medications, blood draw, and to maintain line patency      Allergies    Dilaudid (Anaphylaxis; Hives)    Intolerances        Vital Signs Last 24 Hrs  T(C): 37 (15 Nov 2021 06:26), Max: 37.1 (14 Nov 2021 11:50)  T(F): 98.6 (15 Nov 2021 06:26), Max: 98.8 (14 Nov 2021 11:50)  HR: 78 (15 Nov 2021 06:26) (75 - 85)  BP: 103/59 (15 Nov 2021 06:26) (103/59 - 127/72)  BP(mean): --  RR: 18 (15 Nov 2021 06:26) (18 - 18)  SpO2: 96% (15 Nov 2021 06:26) (95% - 99%)    I&O's Summary    14 Nov 2021 07:01  -  15 Nov 2021 07:00  --------------------------------------------------------  IN: 1120 mL / OUT: 1440 mL / NET: -320 mL        ON EXAM:  Constitutional: NAD, awake    HEENT: Moist Mucous Membranes, Anicteric  Pulmonary: Decreased breath sounds b/l. No rales, crackles or wheeze appreciated.   Cardiovascular: Regular, S1 and S2, No murmurs, rubs, gallops or clicks  Gastrointestinal: Bowel Sounds present, soft, nontender.   Lymph: No peripheral edema. No lymphadenopathy.  Skin: No visible rashes or ulcers. + wound vac   Psych:  Mood & affect appropriate for situation    LABS: All Labs Reviewed:                        10.1   5.92  )-----------( 281      ( 15 Nov 2021 07:56 )             32.2                         9.4    5.27  )-----------( 288      ( 14 Nov 2021 07:42 )             29.6                         8.7    4.89  )-----------( 301      ( 13 Nov 2021 06:52 )             28.3     15 Nov 2021 07:54    135    |  101    |  9      ----------------------------<  105    4.3     |  23     |  0.87   14 Nov 2021 07:42    136    |  101    |  9      ----------------------------<  107    3.9     |  23     |  0.85   13 Nov 2021 06:52    135    |  101    |  12     ----------------------------<  88     3.8     |  23     |  0.83     Ca    9.3        15 Nov 2021 07:54  Ca    9.1        14 Nov 2021 07:42  Ca    9.0        13 Nov 2021 06:52  Phos  3.9       15 Nov 2021 07:54  Phos  3.6       14 Nov 2021 07:42  Phos  3.5       13 Nov 2021 06:52  Mg     1.9       15 Nov 2021 07:54  Mg     1.9       14 Nov 2021 07:42  Mg     1.8       13 Nov 2021 06:52    TPro  6.6    /  Alb  3.1    /  TBili  0.4    /  DBili  x      /  AST  13     /  ALT  6      /  AlkPhos  77     13 Nov 2021 06:52          Blood Culture:

## 2021-11-15 NOTE — PROGRESS NOTE ADULT - REASON FOR ADMISSION
abd surgery
[place]
abd surgery
hf
[place]
abdominal pain
post-[facility]
[place]

## 2021-11-15 NOTE — PROGRESS NOTE ADULT - ASSESSMENT
80M (from [facility]) with PMH pancreatic cancer (dx 3/2021, chemo) s/p ERCP s/p whipple 3/2021, HTN, HLD, CHF (EF: 35%), CAD s/p stents x2 (~15 years ago), AICD (implanted 2005, extracted and re-implantation 9/2021), TAVR (4/2021), bilateral PE (7/2021) on Xarelto, spinal stenosis, macular degeneration, GERD, BPH, Left THR (x2 revisions), left femur fracture (hardware), Osteomyelitis right foot s/p right hallux amputation 9/2021, MSSA bacteremia, explantation and reimplantation of AICD 10/4/21, PICC Line RUE was on IV antibiotics, recently changed to PO.  Now, he is s/p Whipple 10/27, recovered in SICU, now transferred to the floor. NSVT w/ appropriate shock from AICD on 11/1, transferred to SICU for further management. Now on floor. CT 11/3 with Loculated fluid collection adjacent to the afferent jejunal loop in the right upper quadrant.  s/p IR aspiration /drainage.  Now with VRE bacteremia-high grade     #VRE Bacteremia   source not clear:  PICC, Abdominal Collections, Defibrillator, TAVR?  s/p PICC removal   Abdominal Fluid Collections without growth of organisms (?not superinfected)  CORBIN without obvious vegetations  EP without plans to remove PPM  While there is no objective evidence of endocarditis given plan for retaining PPM and given presence of prosthetic valves there is high risk with not empirically treating for endocarditis. Favor treating for 6 weeks with Ampicillin/Ceftriaxone.   Persistent nausea may be in part caused by antimicrobials but would maintain current coverage as it is the most optimal empiric coverage for IE  --Continue Ampicillin 2g IV Q4H and Ceftriaxone 2g IV Q12H for 6 weeks from cleared BCx (11/6 ---> 12/17)  --Continue CBC with Diff and CMP qWeekly while on antimicrobials. Please have results faxed to [phone]  --Patient should follow up with me in the Infectious Diseases Office at [address] [place] [address] in 3-4 weeks time. Office contact number is [phone]    #Abdominal collections  - s/p aspiration  - No Growth on cultures (?not superinfected)    #Foot wound  - No active s/s infection  - monitor clinically    Pancreatic ca  - s/p whipple  - otherwise, per primary team    I will sign off at this time. Please feel free to contact me with any further questions or concerns.    Sanjay Andres M.D.  [facility] Division of Infectious Disease  8AM-5PM: Pager Number [phone]  After Hours (or if no response): Please contact the Infectious Diseases Office at [phone]

## 2021-11-15 NOTE — DISCHARGE NOTE NURSING/CASE MANAGEMENT/SOCIAL WORK - PATIENT PORTAL LINK FT
You can access the FollowMyHealth Patient Portal offered by [facility] by registering at the following website: [email or web address]. By joining [employer]’s FollowMyHealth portal, you will also be able to view your health information using other applications (apps) compatible with our system.

## 2021-11-15 NOTE — PROGRESS NOTE ADULT - ASSESSMENT
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 Patient now s/p Whipple 10/27, recovered in SICU, now transferred to the floor. NSVT w/ appropriate shock from AICD on 11/1, transferred to SICU for further management. Now on floor. CT 11/3 with Loculated fluid collection adjacent to the afferent jejunal loop in the right upper quadrant, now s/p IR drainage 11/5. Blood cx 11/4 w VRE, on daptomycin. Blood cx 11/6 and 11/7 NGTD.     Plan  - COVID test today   - VAC care as per wound care team  - PICC OK to use  - UGI w SBFT evaluate delayed gastric emptying today  - CW lasix upon DC   - Standing reglan 2/2 nausea/vomiting and possible delayed gastric emptying  - Zofran PRN   - Cw tele monitoring   - Blood cx 11/4: VRE  - F/u Blood cx 11/5 and 11/6: NGTD   - Abx: Ampicillin 2g Q4 and CTX 2g [id number] inpatient, Ampicillin 2g Q24, CTX 2g Q12 for six weeks upon DC  - Lasix 20mg   - DVT PPX; SQH  - Cardiology recommendations appreciate, will restart xarelto when safe from our perspective. resume AC on discharge   - trend LFTs (amiodarone)      [id number]  Red Surgery          80M w/ PMHx pancreatic cancer (dx 3/2021, s/p neoadjuvant therapy), HTN, HLD, CHF (EF: 35%), CAD s/p stents x2 (~15 years ago), AICD (implanted 2005, extracted and re-implantation 9/2021), TAVR (4/2021), bilateral PE (7/2021) on Xarelto, spinal stenosis, macular degeneration, GERD, BPH, Left THR (x2 revisions), left femur fracture (hardware), 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 Patient now s/p Whipple 10/27, recovered in SICU, now transferred to the floor. NSVT w/ appropriate shock from AICD on 11/1, transferred to SICU for further management. Now on floor. CT 11/3 with Loculated fluid collection adjacent to the afferent jejunal loop in the right upper quadrant, now s/p IR drainage 11/5. Blood cx 11/4 w VRE, on daptomycin. Blood cx 11/6 and 11/7 NGTD.     Plan  - COVID test today   - VAC care as per wound care team  - PICC OK to use  - CW lasix upon DC   - Standing reglan 2/2 nausea/vomiting and possible delayed gastric emptying  - Zofran PRN   - Cw tele monitoring   - Blood cx 11/4: VRE  - F/u Blood cx 11/5 and 11/6: NGTD   - Abx: Ampicillin 2g Q4 and CTX 2g [id number] inpatient, Ampicillin 2g Q24, CTX 2g Q12 for six weeks upon DC  - Lasix 20mg   - DVT PPX; SQH  - Cardiology recommendations appreciate, will restart xarelto when safe from our perspective. resume AC on discharge   - trend LFTs (amiodarone)  - Dispo planning      [id number]  Red Surgery

## 2021-11-15 NOTE — PROGRESS NOTE ADULT - PROVIDER SPECIALTY LIST ADULT
Anesthesia
Cardiology
Electrophysiology
Infectious Disease
Infectious Disease
Intervent Radiology
Podiatry
SICU
SICU
Surgery
Anesthesia
Cardiology
Electrophysiology
Infectious Disease
Intervent Radiology
Pain Medicine
Podiatry
Podiatry
SICU
Surgery
Cardiology
Electrophysiology
Infectious Disease
SICU
Surgery
Surgery
Cardiology
Cardiology
Infectious Disease
SICU

## 2021-11-17 LAB
CULTURE RESULTS: SIGNIFICANT CHANGE UP
SPECIMEN SOURCE: SIGNIFICANT CHANGE UP

## 2021-11-22 ENCOUNTER — OUTPATIENT (OUTPATIENT)
Dept: OUTPATIENT SERVICES | Facility: HOSPITAL | Age: 81
LOS: 1 days | Discharge: HOME | End: 2021-11-22

## 2021-11-22 DIAGNOSIS — C25.9 MALIGNANT NEOPLASM OF PANCREAS, UNSPECIFIED: ICD-10-CM

## 2021-11-22 DIAGNOSIS — Z89.421 ACQUIRED ABSENCE OF OTHER RIGHT TOE(S): Chronic | ICD-10-CM

## 2021-11-22 DIAGNOSIS — Z90.410 ACQUIRED TOTAL ABSENCE OF PANCREAS: Chronic | ICD-10-CM

## 2021-11-22 DIAGNOSIS — Z96.60 PRESENCE OF UNSPECIFIED ORTHOPEDIC JOINT IMPLANT: Chronic | ICD-10-CM

## 2021-11-22 DIAGNOSIS — Z95.2 PRESENCE OF PROSTHETIC HEART VALVE: Chronic | ICD-10-CM

## 2021-11-22 DIAGNOSIS — Z98.89 OTHER SPECIFIED POSTPROCEDURAL STATES: Chronic | ICD-10-CM

## 2021-11-22 DIAGNOSIS — D29.20 BENIGN NEOPLASM OF UNSPECIFIED TESTIS: Chronic | ICD-10-CM

## 2021-11-22 DIAGNOSIS — Z95.810 PRESENCE OF AUTOMATIC (IMPLANTABLE) CARDIAC DEFIBRILLATOR: Chronic | ICD-10-CM

## 2021-11-22 DIAGNOSIS — Z87.81 PERSONAL HISTORY OF (HEALED) TRAUMATIC FRACTURE: Chronic | ICD-10-CM

## 2021-11-23 ENCOUNTER — APPOINTMENT (OUTPATIENT)
Dept: SURGICAL ONCOLOGY | Facility: CLINIC | Age: 81
End: 2021-11-23

## 2021-11-23 ENCOUNTER — RESULT REVIEW (OUTPATIENT)
Age: 81
End: 2021-11-23

## 2021-11-23 ENCOUNTER — APPOINTMENT (OUTPATIENT)
Dept: HEMATOLOGY ONCOLOGY | Facility: CLINIC | Age: 81
End: 2021-11-23

## 2021-12-04 LAB
CULTURE RESULTS: SIGNIFICANT CHANGE UP
SPECIMEN SOURCE: SIGNIFICANT CHANGE UP

## 2021-12-10 NOTE — ASSESSMENT
[FreeTextEntry1] : Mr. JUANIS DE SANTIAGO is a 80 year old who presents for post-operative evaluation after a whipple operation 10/26/21. 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 Pancreatic resection margin is positive, grade 3 (poor) response to chemo. Discussed again that preoperatively he did not complete the full course of neoadjuvant chemo given his medical comorbidities and OM episode requiring amputation. Discussed again that typically for patients that present with tumors like his that we try to optimize neoadjuvant chemo and radiation prior to surgical resection. When we first met he had been off systemic therapy for quite some time and we discussed that if her were to move forward with radiation in the neoadjuvant setting that there would be a longer delay to surgery while also not getting full systemic chemotherapy. Given this pathology I recommend seeing radiation oncology to discuss adjuvant radiation once he is discharged from rehab. We will make a referral. Patient should also follow up with medical oncology to discuss options for adjuvant therapy although i do not think he is shape to get therapy at this time.  \par We discussed his post-operative and recovery period and restrictions moving forward which include no lifting greater than 10 lbs for 8 weeks. We discussed dietary options and I encouraged the patient to advance as tolerated.  Wound vac is intact and healing per report and is improving at rehab.  Encouraged to increase physical activity as well. Mr. DE SANTIAGO will return to clinic in a few weeks and will try to arrange radiation and medical oncology visit on the same day. \par

## 2021-12-10 NOTE — HISTORY OF PRESENT ILLNESS
[Post-Op Complications] : Post-op complications reported [Intra-abdominal abscess] : intra-abdominal abscess [Wound complications] : wound complications [Invasive drain or angio by CVDL] : Invasive drain or angio by CVDL [FreeTextEntry1] : This visit was provided via telehealth using real-time 2-way audio visual technology. The patient, JUANIS DE SANTIAGO, was located at acute rehab facility at the time of the visit. This provider was located at the clinic in [place], [place] at the time of the visit. The provider, patient and granddaughter participated in the telehealth encounter.  Verbal consent was given 11/23/2021 by the patient. \par \par Mr. JUANIS DE SANTIAGO is a 80 year old who presents for post-operative evaluation after a whipple operation 10/26/21. 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 PMH of HTN, HLD, CHF (EF: 35%), CAD s/p stents x2 (years ago), AICD (implanted 2005, extracted and re-implantation 9/2021), TAVR (4/2021), bilateral PE (7/2021) on Xarelto, spinal stenosis, macular degeneration, GERD, BPH, Left THR (x2 revisions), left femur fracture (hardware), osteomyelitis right foot s/p right hallux amputation 9/2021, MSSA bacteremia, PICC Line RUE was on IV antibiotics, changed to PO prior to surgery. \par \par His postoperative course was c/b hypotension, SVT vs. rapid Afib, AMS, and VRE requiring prolonged ICU stay.  Developed a fluid collection s/p IR drainage, wound vac intact and discharged to rehab with IV antibiotics. \par \par Since arrival at the rehab, had intermittent nausea and was spitting up phlegm and minimal amount of light green bile felt to be related to abx and postoperative course. Concern for possible GIB, the patient has remained on PPI and was reassessed by the site MD. Hgb went from 10 to 8 however Hgb was 8 also just before leaving the hospital. Padroni to be related to hydration status and had no evidence of bleeding. vitals were stable.  Antiemetics were adjusted and symptoms resolved. Passing gas and had a BM today, endorses intermittent constipation. No melena or BRBPR. No bloody emesis. \par \par Today he denies abdominal pain, is taking pain medication if needed.  Today denies nausea, vomiting or diarrhea. His appetite is improving. He looks well today on telehealth. \par \par

## 2021-12-14 NOTE — REASON FOR VISIT
Patient last seen : 11/05/2018    Follow up scheduled for: 05/06/2019    Medication last refilled:  Allopurinol 300 mg 6/6/18    Medication last refilled: Nadolol 20 mg 5/3/18    
[Follow-Up Visit] : a follow-up

## 2021-12-14 NOTE — HISTORY OF PRESENT ILLNESS
[de-identified] : 79 y/o man with resected pancreatic cancer, margin positive disease, who had completed 5-months of D1/D8/D15 gemcitabine+nab-paclitaxel neoadjuvant therapy; and PMHx of extensive cardiovascular disease & osteomyelitis, presenting to medical oncology.\par \par Chronological Cancer History:\par 03/02/21 CT AP for abdominal pain & jaundice showed CBD dilitation at head of pancreas\par 03/05/21 EUS showed pancreatic mass\par 03/12/21 CT CAP at [facility] -> staged as resectable disease, planned for Whipple but not cleared from cardiac standpoint\par 03/24/21 underwent TAVR\par 04/15/21 staging laparoscopy saged as borderline resectable\par <when> C1 gemcitabine+nab-paclitaxel (Dr Carrasco)\par 09/07/21 C5 gemcitabine+nab-paclitaxel, courses c/b neutropenia requiring growth factor\par 09/26/21 admission for right foot big toe osteomyelitis s/p amputation\par 10/26/21 s/p resection (Whipple; [facility]), ypT3 N0, positive margin; pMMR; discharged to rehab facility\par \par Family cancer hx:  Father- prostate ;mother ovarian?\par  [de-identified] : 12/14\par - remains in rehabilitation facility\par - walks with a walker, is unstable without one\par - barely able to walk in the [place]\par - still using wound vac for abdominal wound but that is being stopped\par - still on IV abx for his osteomyeolitis but that is being stopped in next few days\par - daughter Kristi accompanies\par - patient is in wheelchair\par

## 2021-12-14 NOTE — ASSESSMENT
[FreeTextEntry1] : 81 y/o man with resected pancreatic cancer, margin positive disease, who had completed 5-months of D1/D8/D15 gemcitabine+nab-paclitaxel neoadjuvant therapy; and PMHx of extensive cardiovascular disease, presenting to medical oncology.\par \par He completed 5 months of chemotherapy in the adjuvant setting. The positive margin confers a high risk of local recurrence so should be evaluated by radiation oncology for consideration of RT to the resection bed. He has not yet had a post-op baseline scan, which should be done, to review for local and distant disease. If he is found to have measurable metastatic disease, I would consider starting chemotherapy. However, at this point I will refrain from continuing his neoadjuvant regimen to complete a 6th cycle of adjuvant since his performance status is in the ECOG 2-3 range, the pathologic response was poor, and he did not tolerate chemotherapy that well. Otherwise, I will plan to see him for surveillance and start chemotherapy if he has radiologic evidence of disease.\par \par Plan:\par - CT CAP w contrast as new baseline post-op (DAK ordered)\par - labs today: CBC, CMP, , Signatera (will be interest to assess in light of positive margin) (DAK ordered)\par - can order Invitae next time (did not order this time)\par - no role for Foundation testing unless relapse confirmed\par - RT for +margin, SBRT is being considered (Beto Carmona)\par - plan to RTC approximately 2-months after RT and plan to repeat surveillance CT CAP 3 months after new baseline (approximately March 2022). Patient & daughter Kristi advised to contact our clinic in 2 months and we will order the scans and test needed for follow-up.\par \par David Juan MD PhD\par Medical Oncology Attending\par \par I personally have spent a total of 45 minutes of time on the date of this encounter reviewing test results, documenting findings, coordinating care, and directly consulting with the patient and/or designated family member.\par \par \par

## 2021-12-15 NOTE — ADDENDUM
[FreeTextEntry1] : I saw and examined the patient with the resident/ACP.  In summary, Mr. JUANIS DE SANTIAGO is a 80 year male with resected pancreas cancer with a RP positive margin.  I reviewed the records, images, and repeated the key components of the physical exam.  We discussed the potential acute and chronic side effects of SBRT to the tumor bed.  This is based on data from [facility] and [facility].  The patient gave informed consent to proceed.  We will proceed with simulation next week after new staging scans. SHAKIRA

## 2021-12-15 NOTE — HISTORY OF PRESENT ILLNESS
[FreeTextEntry1] : Mr. JUANIS DE SANTIAGO is a 79yo M w/ multiple comorbidities and pancreatic cancer s/p [place]/ Abraxane at outside institution and Whipple surgery w/ Dr. Mercer on 10/26/2021. Pathology showing ypT3N0, and positive margins. Patient presents to discuss radiation therapy.\par \par Brief Oncological History:\par PMH is significant for pancreatic cancer (dx 3/2021), HTN, HLD, CHF, CAD s/p stents x2 (~15 years ago), defibrillator, TAVR (4/2021), bilateral PE (7/2021) on Xarelto, spinal stenosis, GERD, BPH, macular degeneration. \par \par Pancreatic cancer history initially p/w abdominal pain and jaundice in March to [facility].\par \par 3/2/21 - CT with mild CBD dilatation at the head of pancreas. \par 3/5/21 - EUS noting pancreas head mass abutting the portal vein and ERCP with stent placement. \par \par He went to [facility] [place] opinion and CT was repeated 3/12/21. He was staged as resectable, was planned for upfront surgery (Whipple) but was not cleared from cardiac standpoint. He then had TAVR procedure at [facility] on 3/25/21 where the patient decided to also assume oncological care. \par \par On 4/15/21 he underwent staging laparoscopy by Dr Murphy and was staged as borderline resectable (noted tumor touching the portal vein). He started neoadjuvant chemotherapy with Dr. Carrasco- Gemzar / Abraxane and completed C5D8 of 9/7/21. He received 2 infusions total of Zarxio for neutropenia. \par \par On 9/26/21, presented to [facility] w/ right hallux acute OM/ MSSA bacteremia. He is s/p R hallux amputation on 9/20, c/b ruptured hematoma s/p CORBIN without evidence of vegetations, ICD lead extraction prophylactically, and mediport removal. He then underwent subcutaneous ICD placement on 10/4. He was D/C to subacute rehab on 10/5. He was d/c on course of antibiotics (cefazolin 2 g q8h until 10/14 to then keflex x 14 more days). \par \par He is on Xarelto for history of PE that was found a few months ago on chest CT at [facility]. No PE noted on CT chest in 8/13/21. \par \par On 10/26/2021, patient underwent Whipple w/ Dr. Mercer, pathology notable for positive margins at pancreatic resection margin and vascular margin, ypT3 N0. \par \par Post sugrical course complicated with wound abcess and now has a wound vac that is being planned to be stopped. He remains at rehab, walks with walker, but improving, though he also uses wheelchair for ambulation help. HE completes the course of Abx this week and likely planned for discharge from rehab.

## 2021-12-15 NOTE — PHYSICAL EXAM
[General Appearance - In No Acute Distress] : in no acute distress [Sclera] : the sclera and conjunctiva were normal [] : no respiratory distress [Outer Ear] : the ears and nose were normal in appearance [Heart Rate And Rhythm] : heart rate and rhythm were normal [Nondistended] : nondistended [Abdomen Tenderness] : non-tender [Normal] : no palpable adenopathy [Musculoskeletal - Swelling] : no joint swelling [Skin Color & Pigmentation] : normal skin color and pigmentation [Oriented To Time, Place, And Person] : oriented to person, place, and time [de-identified] : Wound clean

## 2021-12-22 NOTE — ASSESSMENT
[FreeTextEntry1] : 10/12/21  Surg/Onc Dr Mercer\par A/P\par \par \par Mr. JUANIS DE SANTIAGO is a 80 year old male who presents for evaluation in our Pancreas Multidisciplinary Clinic. His PMH is significant for pancreatic cancer (dx 3/2021), HTN, HLD, CHF, CAD s/p stents x2 (~15 years ago), defibrillator, TAVR (4/2021), bilateral PE (7/2021) on Xarelto, spinal stenosis, GERD, BPH, macular degeneration. \par He recently presented to [facility] and was transferred to [facility] on 9/26/21 w/ right hallux acute OM/ MSSA bacteremia. He is s/p R hallux amputation on 9/20, c/b ruptured hematoma. s/p CORBIN without evidence of vegetations, ICD lead extraction prophylactically, and mediport removal. He then underwent subcutaneous ICD placement on 10/4. He was d/c'd to subacute rehab on 10/5. He was d/c on course of antibiotics (cefazolin 2 g q8h until 10/14 to then keflex x 14 more days). \par \par Pancreatic cancer history-- initially p/w abdominal pain and jaundice in March to [facility]. 3/2/21 CT with mild CBD dilatation at the head of pancreas. 3/5/21 EUS noting pancreas head mass abutting the portal vein and ERCP with stent placement. He went to [facility] for second opinion and CT was repeated 3/12/21. He was staged as resectable, was planned for upfront surgery (Whipple) but was not cleared from cardiac standpoint. He then had TAVR procedure at [facility] on 3/25/21 where the patient decided to also assume oncological care. On 4/15/21 he underwent staging laparoscopy by Dr Murphy and was staged as borderline resectable (noted tumor touching the portal vein). He started neoadjuvant chemotherapy with Dr. Carrasco- Gemzar / Abraxane and completed C5D8 of 9/7/21. He received 2 infusions total of Zarxio for neutropenia. He is on Xarelto for history of PE that was found a few months ago on chest CT at [facility]. No PE noted on CT chest in 8/13/21. \par \par Discussed that his tumor is technically resectable. He did start chemotherapy, however, did not a full course of neoadjuvant chemo given his medical comorbidities and OM episode requiring amputation. Discussed that typically we try to optimize neoadjuvant chemo and radiation if necessary prior to surgical resection. He presents today for a third opinion and has been off therapy since 9/7/21. Discussed that if we were to move forward with radiation there were be a longer delay to surgery while not getting full systemic chemotherapy. At this time I recommend moving forward with surgery.\par \par Due to the location and size I recommend a pancreaticoduodenectomy (Whipple) procedure at this time. Jose a diagram for the patient and family to better understand the operation. The procedure and associated risks, benefits and possible complications were discussed and all questions were answered. The procedure involves removal of the gallbladder, head of the pancreas, bile duct, duodenum and sometimes a small portion of the stomach and will last 4-6 hours with a hospital stay of about one week. We discussed that the most common complaint after this procedure is fatigue followed by poor appetite. It will take approximately 8-12 weeks to feel "back to normal" after the operation. Risks, benefits and details of the operation were discussed. These include bleeding, infection, damage to surrounding structures, chyle leakage, delayed gastric emptying, cardiopulmonary complications and the risks of anesthesia. Mortality quoted at <1.5%. Discussed that adjuvant treatment including additional chemotherapy/ radiation therapy will be determined by final pathology. The patient expressed understanding and the desire to proceed. Our office will contact the patient to schedule. He will need cardiac clearance and recommendations for anticoagulation prior to surgery. \par \par Today, I personally spent 60 minutes in total time including reviewing imaging and studies, discussing complex treatment regimens, direct face to face time with the patient, patient education and counseling.\par  \par  \par \par \par 10/12/21  Med/Onc Dr Juan\par A/P\par \par \par 79 y/o man with extensive cardiac history (TAVR 4/2021, bilateral PE 7/2021 on rivaroxaban), s/p ~5 months of gemcitabine+nab-paclitaxel neoadjuvant therapy now with surgically resectable disease, presenting to medical oncology to establish care with [facility] oncology.\par \par He and his family asked many good questions about risk factors a/w pancreatic cancer, the implications of his diagnosis on his well being and prognosis. While concerned that the surgery is not a guarantee of cure, he acknowledged that the operation is our only approach for curative intent and that his response to chemotherapy suggests some degree of chemotherapy sensitivity in his tumor, which is a good prognostic factor. He asked if I would provide adjuvant chemotherapy surgery and I said I would consider it. He has already received, and tolerated, quite a bit of therapy, on the order of 5 months, which approximates our typical delivery of ~6 months of adjuvant chemotherapy. That said, depending on his recovery after the operation, and on the response of his tumor on surgical pathology, and his goals, I would consider continue some form of chemotherapy in the adjuvant setting.\par \par Looking forward to seeing him and his family again in my clinic a few weeks following surgery. \par \par David Juan MD PhD\par Medical Oncology Attending\par

## 2021-12-22 NOTE — HISTORY OF PRESENT ILLNESS
[Abdominal Pain] : abdominal pain [Jaundice] : jaundice [ERCP] : ERCP [Endostent] : endostent [EUS] : EUS [Biopsy] : biopsy performed [Before Surgery] : before surgery [Borderline] : borderline [Pancreatic ductal adenocarcinoma] : Pancreatic ductal adenocarcinoma [Resectable] : resectable [PV-SMV] : PV-SMV [Less than 180 (abutment)] : less than 180 (abutment) [head] : head [Disease demonstrating good response] : disease demonstrating good response [Tolerated well] : tolerated well [Surgical resection] : surgical resection [Curative (aimed at resection)] : curative (aimed at resection) [Nutrition] : Nutrition [Ambulatory and capable of all self care but unable to carry out any work activities] : Status 2 - Ambulatory and capable of all self care but unable to carry out any work activities. 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 JUANIS DE SANTIAGO is a 80 year old male who presents for evaluation in our Pancreas Multidisciplinary Clinic.\par His PMH is significant for pancreatic cancer (dx 3/2021), HTN, HLD, CHF, CAD s/p stents x2 (~15 years ago), defibrillator, TAVR (4/2021), bilateral PE (7/2021) on Xarelto, spinal stenosis, GERD, BPH, macular degeneration. \par He recently presented to [facility] and was transferred to [facility] on 9/26/21 w/ right hallux acute OM/  MSSA bacteremia. He is s/p R hallux amputation on 9/20, c/b ruptured hematoma.  s/p CORBIN without evidence of vegetations, ICD lead extraction prophylactically, and mediport removal. He then underwent subcutaneous ICD placement on 10/4.  He was d/c'd to subacute rehab on 10/5. He was d/c on course of antibiotics (cefazolin 2 g q8h until 10/14 to then keflex x 14 more days).  \par \par Pancreatic cancer history-- initially p/w abdominal pain and jaundice in March to [facility]. 3/2/21 CT with mild CBD dilatation at the head of pancreas. 3/5/21 EUS noting pancreas head mass abutting the portal vein and ERCP with stent placement.  He went to [facility] for [place] opinion and CT was repeated 3/12/21. He was staged as resectable, was planned for upfront surgery (Whipple) but was not cleared from cardiac standpoint. He then had TAVR procedure at [facility] on 3/25/21 where the patient decided to also assume oncological care. On 4/15/21 he underwent staging laparoscopy by Dr Murphy and was staged as borderline resectable (noted tumor touching the portal vein).  He started neoadjuvant chemotherapy with Dr. Carrasco- Gemzar / Abraxane and completed C5D8 of 9/7/21. He received 2 infusions total of Zarxio for neutropenia.  He is on Xarelto for history of PE that was found a few months ago on chest CT at [facility]. No PE noted on CT chest in 8/13/21.   \par \par Genetic testing:  Pt states no genetic mutations found \par \par Patient is staying in rehab and reports his allowed 25% weight bearing on his right lower extremity. He is able to walk with a walker/ assist.  Prior to foot surgery he was able to walk on his own. \par Family cancer hx:  Father- prostate \par  [TextBox_4] : 3/2021 [TextBox_23] : 100 [TextBox_39] : outside path [TextBox_41] : adenocarcinoma  [TextBox_43] : 3/5/21  [TextBox_38] : 53 [] : no [TextBox_5] : 12/14/21 [TextBox_40] : 10/2/21 [FreeTextEntry6] : Given positive margins pt may benefit from adjuvant radiation +/- chemo.  Had poor chemo tolerance previously.  [TextBox_6] : 10/26/21 [FreeTextEntry1] : [place] [FreeTextEntry3] : 0 [FreeTextEntry4] : 31 [TextBox_12] : [place]/Abraxane [TextBox_14] : G [TextBox_18] : 09/07/2021 [TextBox_20] : 5

## 2021-12-30 PROBLEM — R78.81 ENTEROCOCCAL BACTEREMIA: Status: ACTIVE | Noted: 2021-01-01

## 2022-01-01 ENCOUNTER — NON-APPOINTMENT (OUTPATIENT)
Age: 82
End: 2022-01-01

## 2022-01-01 ENCOUNTER — TRANSCRIPTION ENCOUNTER (OUTPATIENT)
Age: 82
End: 2022-01-01

## 2022-01-01 ENCOUNTER — APPOINTMENT (OUTPATIENT)
Dept: HYPERBARIC MEDICINE | Facility: HOSPITAL | Age: 82
End: 2022-01-01
Payer: MEDICARE

## 2022-01-01 ENCOUNTER — APPOINTMENT (OUTPATIENT)
Dept: WOUND CARE | Facility: HOSPITAL | Age: 82
End: 2022-01-01
Payer: MEDICARE

## 2022-01-01 ENCOUNTER — OUTPATIENT (OUTPATIENT)
Dept: OUTPATIENT SERVICES | Facility: HOSPITAL | Age: 82
LOS: 1 days | Discharge: HOME | End: 2022-01-01
Payer: MEDICARE

## 2022-01-01 ENCOUNTER — APPOINTMENT (OUTPATIENT)
Dept: CARDIOLOGY | Facility: CLINIC | Age: 82
End: 2022-01-01

## 2022-01-01 ENCOUNTER — APPOINTMENT (OUTPATIENT)
Dept: ENDOCRINOLOGY | Facility: CLINIC | Age: 82
End: 2022-01-01

## 2022-01-01 ENCOUNTER — OUTPATIENT (OUTPATIENT)
Dept: OUTPATIENT SERVICES | Facility: HOSPITAL | Age: 82
LOS: 1 days | Discharge: HOME | End: 2022-01-01

## 2022-01-01 ENCOUNTER — RESULT REVIEW (OUTPATIENT)
Age: 82
End: 2022-01-01

## 2022-01-01 ENCOUNTER — OUTPATIENT (OUTPATIENT)
Dept: OUTPATIENT SERVICES | Facility: HOSPITAL | Age: 82
LOS: 1 days | Discharge: ANOTHER HOSPITAL | End: 2022-01-01

## 2022-01-01 ENCOUNTER — INPATIENT (INPATIENT)
Facility: HOSPITAL | Age: 82
LOS: 7 days | Discharge: NURSING, REHAB OR LONG-TERM CARE | End: 2022-10-07
Attending: PLASTIC SURGERY | Admitting: PLASTIC SURGERY

## 2022-01-01 ENCOUNTER — APPOINTMENT (OUTPATIENT)
Dept: SURGICAL ONCOLOGY | Facility: CLINIC | Age: 82
End: 2022-01-01

## 2022-01-01 ENCOUNTER — RX RENEWAL (OUTPATIENT)
Age: 82
End: 2022-01-01

## 2022-01-01 ENCOUNTER — APPOINTMENT (OUTPATIENT)
Dept: HYPERBARIC MEDICINE | Facility: HOSPITAL | Age: 82
End: 2022-01-01

## 2022-01-01 ENCOUNTER — APPOINTMENT (OUTPATIENT)
Dept: ELECTROPHYSIOLOGY | Facility: CLINIC | Age: 82
End: 2022-01-01
Payer: MEDICARE

## 2022-01-01 ENCOUNTER — APPOINTMENT (OUTPATIENT)
Dept: PLASTIC SURGERY | Facility: CLINIC | Age: 82
End: 2022-01-01

## 2022-01-01 ENCOUNTER — INPATIENT (INPATIENT)
Facility: HOSPITAL | Age: 82
LOS: 5 days | DRG: 871 | End: 2022-12-08
Attending: INTERNAL MEDICINE
Payer: MEDICARE

## 2022-01-01 ENCOUNTER — APPOINTMENT (OUTPATIENT)
Dept: PLASTIC SURGERY | Facility: HOSPITAL | Age: 82
End: 2022-01-01

## 2022-01-01 ENCOUNTER — APPOINTMENT (OUTPATIENT)
Dept: HEMATOLOGY ONCOLOGY | Facility: CLINIC | Age: 82
End: 2022-01-01

## 2022-01-01 ENCOUNTER — LABORATORY RESULT (OUTPATIENT)
Age: 82
End: 2022-01-01

## 2022-01-01 ENCOUNTER — OUTPATIENT (OUTPATIENT)
Dept: OUTPATIENT SERVICES | Facility: HOSPITAL | Age: 82
LOS: 1 days | End: 2022-01-01
Payer: MEDICARE

## 2022-01-01 ENCOUNTER — APPOINTMENT (OUTPATIENT)
Dept: ORTHOPEDIC SURGERY | Facility: CLINIC | Age: 82
End: 2022-01-01
Payer: MEDICARE

## 2022-01-01 ENCOUNTER — APPOINTMENT (OUTPATIENT)
Dept: HEMATOLOGY ONCOLOGY | Facility: CLINIC | Age: 82
End: 2022-01-01
Payer: MEDICARE

## 2022-01-01 ENCOUNTER — APPOINTMENT (OUTPATIENT)
Age: 82
End: 2022-01-01

## 2022-01-01 ENCOUNTER — APPOINTMENT (OUTPATIENT)
Dept: ELECTROPHYSIOLOGY | Facility: CLINIC | Age: 82
End: 2022-01-01

## 2022-01-01 ENCOUNTER — APPOINTMENT (OUTPATIENT)
Dept: CARDIOLOGY | Facility: CLINIC | Age: 82
End: 2022-01-01
Payer: MEDICARE

## 2022-01-01 ENCOUNTER — APPOINTMENT (OUTPATIENT)
Dept: PLASTIC SURGERY | Facility: CLINIC | Age: 82
End: 2022-01-01
Payer: MEDICARE

## 2022-01-01 ENCOUNTER — APPOINTMENT (OUTPATIENT)
Dept: WOUND CARE | Facility: HOSPITAL | Age: 82
End: 2022-01-01

## 2022-01-01 ENCOUNTER — INPATIENT (INPATIENT)
Facility: HOSPITAL | Age: 82
LOS: 2 days | Discharge: NURSING, REHAB OR LONG-TERM CARE | End: 2022-11-25
Attending: STUDENT IN AN ORGANIZED HEALTH CARE EDUCATION/TRAINING PROGRAM | Admitting: STUDENT IN AN ORGANIZED HEALTH CARE EDUCATION/TRAINING PROGRAM

## 2022-01-01 ENCOUNTER — APPOINTMENT (OUTPATIENT)
Dept: FAMILY MEDICINE | Facility: CLINIC | Age: 82
End: 2022-01-01
Payer: MEDICARE

## 2022-01-01 ENCOUNTER — APPOINTMENT (OUTPATIENT)
Dept: RADIATION ONCOLOGY | Facility: CLINIC | Age: 82
End: 2022-01-01

## 2022-01-01 ENCOUNTER — APPOINTMENT (OUTPATIENT)
Dept: FAMILY MEDICINE | Facility: CLINIC | Age: 82
End: 2022-01-01

## 2022-01-01 ENCOUNTER — APPOINTMENT (OUTPATIENT)
Dept: CT IMAGING | Facility: CLINIC | Age: 82
End: 2022-01-01
Payer: MEDICARE

## 2022-01-01 ENCOUNTER — APPOINTMENT (OUTPATIENT)
Dept: SURGICAL ONCOLOGY | Facility: CLINIC | Age: 82
End: 2022-01-01
Payer: MEDICARE

## 2022-01-01 ENCOUNTER — APPOINTMENT (OUTPATIENT)
Dept: CT IMAGING | Facility: CLINIC | Age: 82
End: 2022-01-01

## 2022-01-01 ENCOUNTER — OUTPATIENT (OUTPATIENT)
Dept: OUTPATIENT SERVICES | Facility: HOSPITAL | Age: 82
LOS: 1 days | End: 2022-01-01

## 2022-01-01 ENCOUNTER — INPATIENT (INPATIENT)
Facility: HOSPITAL | Age: 82
LOS: 7 days | Discharge: HOME | DRG: 982 | End: 2022-03-25
Attending: STUDENT IN AN ORGANIZED HEALTH CARE EDUCATION/TRAINING PROGRAM | Admitting: FAMILY MEDICINE
Payer: MEDICARE

## 2022-01-01 ENCOUNTER — EMERGENCY (EMERGENCY)
Facility: HOSPITAL | Age: 82
LOS: 1 days | Discharge: NURSING, REHAB OR LONG-TERM CARE | End: 2022-01-01
Attending: STUDENT IN AN ORGANIZED HEALTH CARE EDUCATION/TRAINING PROGRAM
Payer: MEDICARE

## 2022-01-01 ENCOUNTER — INPATIENT (INPATIENT)
Facility: HOSPITAL | Age: 82
LOS: 24 days | Discharge: NURSING, REHAB OR LONG-TERM CARE | End: 2022-11-18
Attending: STUDENT IN AN ORGANIZED HEALTH CARE EDUCATION/TRAINING PROGRAM | Admitting: STUDENT IN AN ORGANIZED HEALTH CARE EDUCATION/TRAINING PROGRAM
Payer: MEDICARE

## 2022-01-01 VITALS
TEMPERATURE: 97.1 F | SYSTOLIC BLOOD PRESSURE: 98 MMHG | HEIGHT: 73 IN | WEIGHT: 200 LBS | DIASTOLIC BLOOD PRESSURE: 63 MMHG | OXYGEN SATURATION: 100 % | BODY MASS INDEX: 26.51 KG/M2 | TEMPERATURE: 96.8 F | HEART RATE: 65 BPM | RESPIRATION RATE: 18 BRPM | DIASTOLIC BLOOD PRESSURE: 68 MMHG | HEART RATE: 64 BPM | OXYGEN SATURATION: 98 % | SYSTOLIC BLOOD PRESSURE: 130 MMHG

## 2022-01-01 VITALS
RESPIRATION RATE: 18 BRPM | SYSTOLIC BLOOD PRESSURE: 127 MMHG | DIASTOLIC BLOOD PRESSURE: 66 MMHG | OXYGEN SATURATION: 98 % | TEMPERATURE: 97.6 F | HEART RATE: 62 BPM

## 2022-01-01 VITALS
RESPIRATION RATE: 20 BRPM | TEMPERATURE: 98 F | HEART RATE: 66 BPM | HEART RATE: 69 BPM | SYSTOLIC BLOOD PRESSURE: 118 MMHG | DIASTOLIC BLOOD PRESSURE: 78 MMHG | TEMPERATURE: 97.8 F | OXYGEN SATURATION: 96 % | RESPIRATION RATE: 17 BRPM | OXYGEN SATURATION: 98 % | SYSTOLIC BLOOD PRESSURE: 147 MMHG | SYSTOLIC BLOOD PRESSURE: 106 MMHG | RESPIRATION RATE: 20 BRPM | TEMPERATURE: 98 F | DIASTOLIC BLOOD PRESSURE: 62 MMHG | DIASTOLIC BLOOD PRESSURE: 68 MMHG | HEART RATE: 62 BPM | OXYGEN SATURATION: 97 % | SYSTOLIC BLOOD PRESSURE: 131 MMHG | RESPIRATION RATE: 16 BRPM | HEART RATE: 70 BPM | DIASTOLIC BLOOD PRESSURE: 82 MMHG | TEMPERATURE: 97.9 F | OXYGEN SATURATION: 95 %

## 2022-01-01 VITALS
HEIGHT: 73 IN | BODY MASS INDEX: 25.18 KG/M2 | OXYGEN SATURATION: 100 % | SYSTOLIC BLOOD PRESSURE: 106 MMHG | DIASTOLIC BLOOD PRESSURE: 62 MMHG | TEMPERATURE: 97.1 F | HEART RATE: 65 BPM | RESPIRATION RATE: 16 BRPM | WEIGHT: 190 LBS

## 2022-01-01 VITALS
HEART RATE: 74 BPM | RESPIRATION RATE: 18 BRPM | SYSTOLIC BLOOD PRESSURE: 119 MMHG | RESPIRATION RATE: 18 BRPM | TEMPERATURE: 98.1 F | TEMPERATURE: 98 F | OXYGEN SATURATION: 98 % | OXYGEN SATURATION: 98 % | DIASTOLIC BLOOD PRESSURE: 69 MMHG | HEART RATE: 68 BPM | HEART RATE: 74 BPM | TEMPERATURE: 98.6 F | RESPIRATION RATE: 20 BRPM | OXYGEN SATURATION: 98 % | DIASTOLIC BLOOD PRESSURE: 89 MMHG | SYSTOLIC BLOOD PRESSURE: 152 MMHG | SYSTOLIC BLOOD PRESSURE: 134 MMHG | HEIGHT: 73 IN | WEIGHT: 203 LBS | BODY MASS INDEX: 26.9 KG/M2 | DIASTOLIC BLOOD PRESSURE: 66 MMHG

## 2022-01-01 VITALS
OXYGEN SATURATION: 100 % | RESPIRATION RATE: 29 BRPM | TEMPERATURE: 97 F | HEART RATE: 63 BPM | SYSTOLIC BLOOD PRESSURE: 123 MMHG | DIASTOLIC BLOOD PRESSURE: 78 MMHG

## 2022-01-01 VITALS
HEIGHT: 78 IN | SYSTOLIC BLOOD PRESSURE: 118 MMHG | HEART RATE: 82 BPM | OXYGEN SATURATION: 97 % | WEIGHT: 194 LBS | DIASTOLIC BLOOD PRESSURE: 66 MMHG | TEMPERATURE: 96.9 F | BODY MASS INDEX: 22.45 KG/M2

## 2022-01-01 VITALS
OXYGEN SATURATION: 100 % | SYSTOLIC BLOOD PRESSURE: 136 MMHG | TEMPERATURE: 97.1 F | HEART RATE: 65 BPM | DIASTOLIC BLOOD PRESSURE: 75 MMHG | RESPIRATION RATE: 18 BRPM

## 2022-01-01 VITALS
OXYGEN SATURATION: 100 % | RESPIRATION RATE: 18 BRPM | HEART RATE: 60 BPM | SYSTOLIC BLOOD PRESSURE: 129 MMHG | TEMPERATURE: 96.9 F | DIASTOLIC BLOOD PRESSURE: 76 MMHG

## 2022-01-01 VITALS
OXYGEN SATURATION: 99 % | TEMPERATURE: 97.8 F | HEART RATE: 68 BPM | DIASTOLIC BLOOD PRESSURE: 68 MMHG | SYSTOLIC BLOOD PRESSURE: 122 MMHG | RESPIRATION RATE: 18 BRPM

## 2022-01-01 VITALS
HEIGHT: 69 IN | HEART RATE: 74 BPM | RESPIRATION RATE: 15 BRPM | OXYGEN SATURATION: 99 % | TEMPERATURE: 98 F | WEIGHT: 197.98 LBS | DIASTOLIC BLOOD PRESSURE: 84 MMHG | SYSTOLIC BLOOD PRESSURE: 146 MMHG

## 2022-01-01 VITALS
RESPIRATION RATE: 18 BRPM | DIASTOLIC BLOOD PRESSURE: 66 MMHG | SYSTOLIC BLOOD PRESSURE: 123 MMHG | TEMPERATURE: 98.4 F | OXYGEN SATURATION: 98 % | HEART RATE: 80 BPM

## 2022-01-01 VITALS
OXYGEN SATURATION: 98 % | TEMPERATURE: 98.2 F | DIASTOLIC BLOOD PRESSURE: 82 MMHG | BODY MASS INDEX: 26.51 KG/M2 | HEART RATE: 57 BPM | RESPIRATION RATE: 20 BRPM | HEIGHT: 73 IN | OXYGEN SATURATION: 99 % | TEMPERATURE: 98.1 F | SYSTOLIC BLOOD PRESSURE: 122 MMHG | HEART RATE: 81 BPM | DIASTOLIC BLOOD PRESSURE: 77 MMHG | SYSTOLIC BLOOD PRESSURE: 131 MMHG | RESPIRATION RATE: 20 BRPM | WEIGHT: 200 LBS

## 2022-01-01 VITALS
HEART RATE: 70 BPM | DIASTOLIC BLOOD PRESSURE: 79 MMHG | SYSTOLIC BLOOD PRESSURE: 137 MMHG | RESPIRATION RATE: 20 BRPM | WEIGHT: 207.9 LBS | TEMPERATURE: 97 F | HEIGHT: 72 IN | SYSTOLIC BLOOD PRESSURE: 140 MMHG | HEART RATE: 65 BPM | TEMPERATURE: 98 F | HEIGHT: 73 IN | BODY MASS INDEX: 26.64 KG/M2 | OXYGEN SATURATION: 98 % | WEIGHT: 201 LBS | RESPIRATION RATE: 16 BRPM | DIASTOLIC BLOOD PRESSURE: 70 MMHG | OXYGEN SATURATION: 100 %

## 2022-01-01 VITALS — DIASTOLIC BLOOD PRESSURE: 60 MMHG | SYSTOLIC BLOOD PRESSURE: 130 MMHG

## 2022-01-01 VITALS
HEART RATE: 81 BPM | OXYGEN SATURATION: 100 % | BODY MASS INDEX: 27.77 KG/M2 | WEIGHT: 194 LBS | DIASTOLIC BLOOD PRESSURE: 78 MMHG | HEIGHT: 70 IN | SYSTOLIC BLOOD PRESSURE: 136 MMHG

## 2022-01-01 VITALS
SYSTOLIC BLOOD PRESSURE: 127 MMHG | TEMPERATURE: 98.2 F | OXYGEN SATURATION: 98 % | DIASTOLIC BLOOD PRESSURE: 70 MMHG | RESPIRATION RATE: 18 BRPM | HEART RATE: 85 BPM

## 2022-01-01 VITALS
HEART RATE: 66 BPM | TEMPERATURE: 97 F | DIASTOLIC BLOOD PRESSURE: 74 MMHG | HEART RATE: 60 BPM | SYSTOLIC BLOOD PRESSURE: 132 MMHG | RESPIRATION RATE: 20 BRPM | OXYGEN SATURATION: 100 % | DIASTOLIC BLOOD PRESSURE: 68 MMHG | SYSTOLIC BLOOD PRESSURE: 124 MMHG | TEMPERATURE: 97.4 F | DIASTOLIC BLOOD PRESSURE: 75 MMHG | OXYGEN SATURATION: 100 % | TEMPERATURE: 97.4 F | HEART RATE: 64 BPM | OXYGEN SATURATION: 100 % | SYSTOLIC BLOOD PRESSURE: 131 MMHG | RESPIRATION RATE: 18 BRPM | RESPIRATION RATE: 20 BRPM

## 2022-01-01 VITALS
OXYGEN SATURATION: 99 % | SYSTOLIC BLOOD PRESSURE: 133 MMHG | HEART RATE: 67 BPM | TEMPERATURE: 97.9 F | RESPIRATION RATE: 20 BRPM | DIASTOLIC BLOOD PRESSURE: 68 MMHG

## 2022-01-01 VITALS
SYSTOLIC BLOOD PRESSURE: 146 MMHG | DIASTOLIC BLOOD PRESSURE: 78 MMHG | OXYGEN SATURATION: 100 % | RESPIRATION RATE: 16 BRPM | TEMPERATURE: 97.4 F | HEART RATE: 65 BPM

## 2022-01-01 VITALS
RESPIRATION RATE: 20 BRPM | SYSTOLIC BLOOD PRESSURE: 106 MMHG | TEMPERATURE: 97.7 F | TEMPERATURE: 98 F | BODY MASS INDEX: 25.31 KG/M2 | OXYGEN SATURATION: 97 % | RESPIRATION RATE: 20 BRPM | HEART RATE: 71 BPM | HEIGHT: 73 IN | DIASTOLIC BLOOD PRESSURE: 56 MMHG | WEIGHT: 191 LBS | HEART RATE: 81 BPM | DIASTOLIC BLOOD PRESSURE: 58 MMHG | TEMPERATURE: 97.4 F | HEART RATE: 60 BPM | OXYGEN SATURATION: 97 % | SYSTOLIC BLOOD PRESSURE: 101 MMHG | DIASTOLIC BLOOD PRESSURE: 65 MMHG | SYSTOLIC BLOOD PRESSURE: 147 MMHG | OXYGEN SATURATION: 98 %

## 2022-01-01 VITALS
HEART RATE: 72 BPM | TEMPERATURE: 98.8 F | DIASTOLIC BLOOD PRESSURE: 63 MMHG | SYSTOLIC BLOOD PRESSURE: 108 MMHG | OXYGEN SATURATION: 98 % | RESPIRATION RATE: 18 BRPM

## 2022-01-01 VITALS
HEART RATE: 72 BPM | SYSTOLIC BLOOD PRESSURE: 137 MMHG | OXYGEN SATURATION: 99 % | TEMPERATURE: 98.8 F | DIASTOLIC BLOOD PRESSURE: 90 MMHG | RESPIRATION RATE: 20 BRPM

## 2022-01-01 VITALS
BODY MASS INDEX: 25.31 KG/M2 | OXYGEN SATURATION: 99 % | SYSTOLIC BLOOD PRESSURE: 112 MMHG | HEIGHT: 70 IN | DIASTOLIC BLOOD PRESSURE: 60 MMHG | TEMPERATURE: 97.6 F | SYSTOLIC BLOOD PRESSURE: 116 MMHG | OXYGEN SATURATION: 96 % | HEART RATE: 60 BPM | WEIGHT: 191 LBS | BODY MASS INDEX: 27.2 KG/M2 | HEIGHT: 73 IN | WEIGHT: 190 LBS | HEART RATE: 71 BPM | DIASTOLIC BLOOD PRESSURE: 69 MMHG

## 2022-01-01 VITALS
WEIGHT: 201 LBS | RESPIRATION RATE: 20 BRPM | HEART RATE: 62 BPM | OXYGEN SATURATION: 100 % | TEMPERATURE: 97 F | HEIGHT: 73 IN | DIASTOLIC BLOOD PRESSURE: 63 MMHG | SYSTOLIC BLOOD PRESSURE: 115 MMHG | BODY MASS INDEX: 26.64 KG/M2

## 2022-01-01 VITALS
WEIGHT: 202 LBS | TEMPERATURE: 97.6 F | HEIGHT: 73 IN | OXYGEN SATURATION: 98 % | SYSTOLIC BLOOD PRESSURE: 138 MMHG | DIASTOLIC BLOOD PRESSURE: 78 MMHG | BODY MASS INDEX: 26.77 KG/M2 | HEART RATE: 67 BPM

## 2022-01-01 VITALS
TEMPERATURE: 98 F | RESPIRATION RATE: 17 BRPM | DIASTOLIC BLOOD PRESSURE: 69 MMHG | SYSTOLIC BLOOD PRESSURE: 124 MMHG | HEART RATE: 100 BPM | OXYGEN SATURATION: 100 %

## 2022-01-01 VITALS
TEMPERATURE: 97.9 F | DIASTOLIC BLOOD PRESSURE: 79 MMHG | HEART RATE: 64 BPM | OXYGEN SATURATION: 100 % | SYSTOLIC BLOOD PRESSURE: 156 MMHG | RESPIRATION RATE: 18 BRPM

## 2022-01-01 VITALS
BODY MASS INDEX: 26.97 KG/M2 | HEIGHT: 73 IN | OXYGEN SATURATION: 96 % | RESPIRATION RATE: 16 BRPM | SYSTOLIC BLOOD PRESSURE: 108 MMHG | TEMPERATURE: 97.3 F | HEART RATE: 74 BPM | WEIGHT: 203.46 LBS | DIASTOLIC BLOOD PRESSURE: 70 MMHG

## 2022-01-01 VITALS
RESPIRATION RATE: 18 BRPM | DIASTOLIC BLOOD PRESSURE: 71 MMHG | OXYGEN SATURATION: 99 % | SYSTOLIC BLOOD PRESSURE: 129 MMHG | HEART RATE: 63 BPM | TEMPERATURE: 98.9 F

## 2022-01-01 VITALS
HEART RATE: 68 BPM | DIASTOLIC BLOOD PRESSURE: 78 MMHG | RESPIRATION RATE: 18 BRPM | OXYGEN SATURATION: 100 % | SYSTOLIC BLOOD PRESSURE: 127 MMHG | TEMPERATURE: 98.7 F

## 2022-01-01 VITALS
DIASTOLIC BLOOD PRESSURE: 83 MMHG | OXYGEN SATURATION: 98 % | TEMPERATURE: 97.6 F | SYSTOLIC BLOOD PRESSURE: 160 MMHG | RESPIRATION RATE: 18 BRPM | HEART RATE: 67 BPM

## 2022-01-01 VITALS
DIASTOLIC BLOOD PRESSURE: 85 MMHG | TEMPERATURE: 98.8 F | HEART RATE: 71 BPM | RESPIRATION RATE: 16 BRPM | OXYGEN SATURATION: 100 % | SYSTOLIC BLOOD PRESSURE: 160 MMHG

## 2022-01-01 VITALS
HEIGHT: 73 IN | HEART RATE: 82 BPM | OXYGEN SATURATION: 95 % | WEIGHT: 191 LBS | TEMPERATURE: 97.6 F | RESPIRATION RATE: 19 BRPM | DIASTOLIC BLOOD PRESSURE: 71 MMHG | BODY MASS INDEX: 25.31 KG/M2 | SYSTOLIC BLOOD PRESSURE: 129 MMHG

## 2022-01-01 VITALS
OXYGEN SATURATION: 98 % | HEART RATE: 77 BPM | RESPIRATION RATE: 20 BRPM | TEMPERATURE: 98.3 F | DIASTOLIC BLOOD PRESSURE: 73 MMHG | SYSTOLIC BLOOD PRESSURE: 144 MMHG

## 2022-01-01 VITALS
RESPIRATION RATE: 20 BRPM | HEIGHT: 73 IN | SYSTOLIC BLOOD PRESSURE: 123 MMHG | TEMPERATURE: 97.1 F | HEART RATE: 88 BPM | WEIGHT: 201 LBS | DIASTOLIC BLOOD PRESSURE: 75 MMHG | BODY MASS INDEX: 26.64 KG/M2 | OXYGEN SATURATION: 100 %

## 2022-01-01 VITALS
TEMPERATURE: 97.8 F | RESPIRATION RATE: 18 BRPM | OXYGEN SATURATION: 100 % | HEART RATE: 20 BPM | DIASTOLIC BLOOD PRESSURE: 68 MMHG | TEMPERATURE: 98.5 F | HEART RATE: 68 BPM | RESPIRATION RATE: 20 BRPM | SYSTOLIC BLOOD PRESSURE: 115 MMHG | OXYGEN SATURATION: 97 % | SYSTOLIC BLOOD PRESSURE: 116 MMHG | DIASTOLIC BLOOD PRESSURE: 71 MMHG

## 2022-01-01 VITALS
DIASTOLIC BLOOD PRESSURE: 62 MMHG | OXYGEN SATURATION: 98 % | HEART RATE: 61 BPM | RESPIRATION RATE: 18 BRPM | TEMPERATURE: 97.7 F | BODY MASS INDEX: 26.77 KG/M2 | WEIGHT: 202 LBS | HEIGHT: 73 IN | SYSTOLIC BLOOD PRESSURE: 105 MMHG

## 2022-01-01 VITALS
SYSTOLIC BLOOD PRESSURE: 110 MMHG | SYSTOLIC BLOOD PRESSURE: 129 MMHG | HEART RATE: 60 BPM | TEMPERATURE: 97.3 F | OXYGEN SATURATION: 100 % | DIASTOLIC BLOOD PRESSURE: 68 MMHG | OXYGEN SATURATION: 98 % | DIASTOLIC BLOOD PRESSURE: 61 MMHG | HEART RATE: 75 BPM | TEMPERATURE: 97.7 F | RESPIRATION RATE: 20 BRPM | RESPIRATION RATE: 18 BRPM

## 2022-01-01 VITALS
BODY MASS INDEX: 26.64 KG/M2 | DIASTOLIC BLOOD PRESSURE: 77 MMHG | RESPIRATION RATE: 20 BRPM | SYSTOLIC BLOOD PRESSURE: 141 MMHG | WEIGHT: 201 LBS | TEMPERATURE: 97.2 F | OXYGEN SATURATION: 99 % | HEIGHT: 73 IN | HEART RATE: 75 BPM

## 2022-01-01 VITALS
OXYGEN SATURATION: 98 % | BODY MASS INDEX: 26.64 KG/M2 | SYSTOLIC BLOOD PRESSURE: 136 MMHG | HEART RATE: 73 BPM | DIASTOLIC BLOOD PRESSURE: 77 MMHG | WEIGHT: 201 LBS | HEIGHT: 73 IN

## 2022-01-01 VITALS
SYSTOLIC BLOOD PRESSURE: 90 MMHG | HEIGHT: 73 IN | DIASTOLIC BLOOD PRESSURE: 57 MMHG | OXYGEN SATURATION: 98 % | RESPIRATION RATE: 18 BRPM | WEIGHT: 197.98 LBS | HEART RATE: 80 BPM

## 2022-01-01 VITALS
DIASTOLIC BLOOD PRESSURE: 63 MMHG | SYSTOLIC BLOOD PRESSURE: 112 MMHG | TEMPERATURE: 97.1 F | HEART RATE: 63 BPM | OXYGEN SATURATION: 97 % | RESPIRATION RATE: 18 BRPM

## 2022-01-01 VITALS
OXYGEN SATURATION: 100 % | HEART RATE: 63 BPM | DIASTOLIC BLOOD PRESSURE: 80 MMHG | RESPIRATION RATE: 18 BRPM | SYSTOLIC BLOOD PRESSURE: 152 MMHG | TEMPERATURE: 97.7 F

## 2022-01-01 VITALS
DIASTOLIC BLOOD PRESSURE: 76 MMHG | HEART RATE: 82 BPM | RESPIRATION RATE: 18 BRPM | SYSTOLIC BLOOD PRESSURE: 137 MMHG | OXYGEN SATURATION: 100 % | TEMPERATURE: 97.9 F

## 2022-01-01 VITALS
DIASTOLIC BLOOD PRESSURE: 61 MMHG | RESPIRATION RATE: 18 BRPM | SYSTOLIC BLOOD PRESSURE: 124 MMHG | OXYGEN SATURATION: 100 % | WEIGHT: 192.9 LBS | TEMPERATURE: 98 F | HEART RATE: 91 BPM | HEIGHT: 73 IN

## 2022-01-01 VITALS
RESPIRATION RATE: 18 BRPM | TEMPERATURE: 98.1 F | OXYGEN SATURATION: 99 % | SYSTOLIC BLOOD PRESSURE: 127 MMHG | HEART RATE: 79 BPM | DIASTOLIC BLOOD PRESSURE: 73 MMHG

## 2022-01-01 VITALS
RESPIRATION RATE: 16 BRPM | HEART RATE: 68 BPM | DIASTOLIC BLOOD PRESSURE: 83 MMHG | SYSTOLIC BLOOD PRESSURE: 149 MMHG | SYSTOLIC BLOOD PRESSURE: 123 MMHG | DIASTOLIC BLOOD PRESSURE: 72 MMHG | RESPIRATION RATE: 20 BRPM | OXYGEN SATURATION: 100 % | HEART RATE: 63 BPM | TEMPERATURE: 98 F | OXYGEN SATURATION: 99 % | TEMPERATURE: 97.7 F

## 2022-01-01 VITALS
OXYGEN SATURATION: 95 % | HEART RATE: 60 BPM | DIASTOLIC BLOOD PRESSURE: 64 MMHG | SYSTOLIC BLOOD PRESSURE: 114 MMHG | TEMPERATURE: 97 F | RESPIRATION RATE: 18 BRPM

## 2022-01-01 VITALS
DIASTOLIC BLOOD PRESSURE: 79 MMHG | RESPIRATION RATE: 18 BRPM | OXYGEN SATURATION: 100 % | TEMPERATURE: 97.9 F | SYSTOLIC BLOOD PRESSURE: 136 MMHG | HEART RATE: 67 BPM

## 2022-01-01 VITALS
RESPIRATION RATE: 18 BRPM | OXYGEN SATURATION: 98 % | RESPIRATION RATE: 18 BRPM | SYSTOLIC BLOOD PRESSURE: 124 MMHG | DIASTOLIC BLOOD PRESSURE: 76 MMHG | TEMPERATURE: 97.8 F | HEART RATE: 78 BPM | DIASTOLIC BLOOD PRESSURE: 69 MMHG | TEMPERATURE: 98.3 F | HEART RATE: 85 BPM | SYSTOLIC BLOOD PRESSURE: 135 MMHG | OXYGEN SATURATION: 98 %

## 2022-01-01 VITALS
SYSTOLIC BLOOD PRESSURE: 145 MMHG | TEMPERATURE: 97.1 F | HEART RATE: 64 BPM | DIASTOLIC BLOOD PRESSURE: 82 MMHG | RESPIRATION RATE: 16 BRPM | OXYGEN SATURATION: 100 %

## 2022-01-01 VITALS
TEMPERATURE: 97.8 F | RESPIRATION RATE: 16 BRPM | DIASTOLIC BLOOD PRESSURE: 80 MMHG | OXYGEN SATURATION: 100 % | SYSTOLIC BLOOD PRESSURE: 152 MMHG | HEART RATE: 65 BPM

## 2022-01-01 VITALS
HEART RATE: 66 BPM | HEART RATE: 65 BPM | SYSTOLIC BLOOD PRESSURE: 163 MMHG | RESPIRATION RATE: 18 BRPM | SYSTOLIC BLOOD PRESSURE: 126 MMHG | TEMPERATURE: 97 F | DIASTOLIC BLOOD PRESSURE: 84 MMHG | OXYGEN SATURATION: 100 % | OXYGEN SATURATION: 100 % | DIASTOLIC BLOOD PRESSURE: 72 MMHG | TEMPERATURE: 97.8 F | RESPIRATION RATE: 20 BRPM

## 2022-01-01 VITALS
SYSTOLIC BLOOD PRESSURE: 127 MMHG | TEMPERATURE: 97.6 F | SYSTOLIC BLOOD PRESSURE: 126 MMHG | TEMPERATURE: 97.9 F | BODY MASS INDEX: 25.18 KG/M2 | WEIGHT: 190 LBS | DIASTOLIC BLOOD PRESSURE: 72 MMHG | OXYGEN SATURATION: 18 % | OXYGEN SATURATION: 100 % | DIASTOLIC BLOOD PRESSURE: 75 MMHG | RESPIRATION RATE: 18 BRPM | HEART RATE: 77 BPM | HEART RATE: 66 BPM | HEIGHT: 73 IN

## 2022-01-01 VITALS
SYSTOLIC BLOOD PRESSURE: 124 MMHG | BODY MASS INDEX: 26.51 KG/M2 | WEIGHT: 200 LBS | HEIGHT: 73 IN | TEMPERATURE: 98.7 F | RESPIRATION RATE: 15 BRPM | OXYGEN SATURATION: 99 % | DIASTOLIC BLOOD PRESSURE: 74 MMHG | HEART RATE: 77 BPM

## 2022-01-01 VITALS
BODY MASS INDEX: 25.98 KG/M2 | WEIGHT: 196 LBS | HEART RATE: 83 BPM | DIASTOLIC BLOOD PRESSURE: 58 MMHG | HEIGHT: 73 IN | OXYGEN SATURATION: 99 % | SYSTOLIC BLOOD PRESSURE: 118 MMHG | TEMPERATURE: 97.7 F

## 2022-01-01 VITALS
HEART RATE: 79 BPM | TEMPERATURE: 98 F | OXYGEN SATURATION: 99 % | HEIGHT: 73 IN | BODY MASS INDEX: 25.31 KG/M2 | DIASTOLIC BLOOD PRESSURE: 66 MMHG | SYSTOLIC BLOOD PRESSURE: 116 MMHG | WEIGHT: 191 LBS

## 2022-01-01 VITALS
RESPIRATION RATE: 18 BRPM | DIASTOLIC BLOOD PRESSURE: 71 MMHG | TEMPERATURE: 98.2 F | OXYGEN SATURATION: 98 % | SYSTOLIC BLOOD PRESSURE: 133 MMHG | HEART RATE: 71 BPM

## 2022-01-01 VITALS
DIASTOLIC BLOOD PRESSURE: 67 MMHG | OXYGEN SATURATION: 94 % | TEMPERATURE: 98.6 F | SYSTOLIC BLOOD PRESSURE: 123 MMHG | RESPIRATION RATE: 18 BRPM | HEART RATE: 62 BPM

## 2022-01-01 VITALS
WEIGHT: 197.98 LBS | TEMPERATURE: 98 F | SYSTOLIC BLOOD PRESSURE: 125 MMHG | DIASTOLIC BLOOD PRESSURE: 58 MMHG | OXYGEN SATURATION: 100 % | RESPIRATION RATE: 16 BRPM | HEART RATE: 75 BPM | HEIGHT: 69 IN

## 2022-01-01 VITALS
SYSTOLIC BLOOD PRESSURE: 126 MMHG | DIASTOLIC BLOOD PRESSURE: 73 MMHG | RESPIRATION RATE: 18 BRPM | TEMPERATURE: 97.1 F | OXYGEN SATURATION: 100 % | HEART RATE: 68 BPM

## 2022-01-01 VITALS
TEMPERATURE: 97.5 F | OXYGEN SATURATION: 100 % | WEIGHT: 199.96 LBS | HEART RATE: 62 BPM | DIASTOLIC BLOOD PRESSURE: 71 MMHG | DIASTOLIC BLOOD PRESSURE: 82 MMHG | RESPIRATION RATE: 20 BRPM | HEIGHT: 74 IN | OXYGEN SATURATION: 100 % | RESPIRATION RATE: 16 BRPM | HEART RATE: 67 BPM | SYSTOLIC BLOOD PRESSURE: 129 MMHG | TEMPERATURE: 97 F | SYSTOLIC BLOOD PRESSURE: 127 MMHG

## 2022-01-01 VITALS
HEART RATE: 70 BPM | RESPIRATION RATE: 18 BRPM | OXYGEN SATURATION: 98 % | SYSTOLIC BLOOD PRESSURE: 112 MMHG | DIASTOLIC BLOOD PRESSURE: 61 MMHG | TEMPERATURE: 97.3 F

## 2022-01-01 VITALS
HEART RATE: 67 BPM | RESPIRATION RATE: 18 BRPM | SYSTOLIC BLOOD PRESSURE: 100 MMHG | DIASTOLIC BLOOD PRESSURE: 61 MMHG | TEMPERATURE: 97.7 F | OXYGEN SATURATION: 98 %

## 2022-01-01 VITALS
WEIGHT: 202 LBS | OXYGEN SATURATION: 100 % | DIASTOLIC BLOOD PRESSURE: 73 MMHG | HEART RATE: 60 BPM | SYSTOLIC BLOOD PRESSURE: 131 MMHG | HEIGHT: 73 IN | BODY MASS INDEX: 26.77 KG/M2

## 2022-01-01 VITALS
RESPIRATION RATE: 16 BRPM | HEART RATE: 61 BPM | OXYGEN SATURATION: 100 % | SYSTOLIC BLOOD PRESSURE: 135 MMHG | TEMPERATURE: 97.3 F | DIASTOLIC BLOOD PRESSURE: 68 MMHG

## 2022-01-01 VITALS
BODY MASS INDEX: 25.31 KG/M2 | HEIGHT: 73 IN | DIASTOLIC BLOOD PRESSURE: 61 MMHG | WEIGHT: 191 LBS | OXYGEN SATURATION: 98 % | SYSTOLIC BLOOD PRESSURE: 100 MMHG | HEART RATE: 66 BPM

## 2022-01-01 VITALS
DIASTOLIC BLOOD PRESSURE: 60 MMHG | HEIGHT: 73 IN | HEIGHT: 73 IN | TEMPERATURE: 97.7 F | HEART RATE: 61 BPM | TEMPERATURE: 98.1 F | RESPIRATION RATE: 20 BRPM | OXYGEN SATURATION: 97 % | OXYGEN SATURATION: 100 % | TEMPERATURE: 98.3 F | OXYGEN SATURATION: 99 % | WEIGHT: 196 LBS | SYSTOLIC BLOOD PRESSURE: 118 MMHG | BODY MASS INDEX: 26.51 KG/M2 | HEART RATE: 86 BPM | DIASTOLIC BLOOD PRESSURE: 59 MMHG | SYSTOLIC BLOOD PRESSURE: 109 MMHG | SYSTOLIC BLOOD PRESSURE: 147 MMHG | DIASTOLIC BLOOD PRESSURE: 78 MMHG | BODY MASS INDEX: 25.98 KG/M2 | WEIGHT: 200 LBS | RESPIRATION RATE: 18 BRPM | HEART RATE: 64 BPM

## 2022-01-01 VITALS
RESPIRATION RATE: 18 BRPM | OXYGEN SATURATION: 99 % | HEART RATE: 68 BPM | TEMPERATURE: 96.9 F | SYSTOLIC BLOOD PRESSURE: 112 MMHG | TEMPERATURE: 99 F | HEART RATE: 92 BPM | DIASTOLIC BLOOD PRESSURE: 63 MMHG | DIASTOLIC BLOOD PRESSURE: 63 MMHG | RESPIRATION RATE: 18 BRPM | SYSTOLIC BLOOD PRESSURE: 126 MMHG | OXYGEN SATURATION: 100 %

## 2022-01-01 VITALS
WEIGHT: 202 LBS | DIASTOLIC BLOOD PRESSURE: 63 MMHG | TEMPERATURE: 97.8 F | OXYGEN SATURATION: 98 % | BODY MASS INDEX: 26.77 KG/M2 | HEIGHT: 73 IN | RESPIRATION RATE: 20 BRPM | SYSTOLIC BLOOD PRESSURE: 115 MMHG | HEART RATE: 64 BPM

## 2022-01-01 VITALS
TEMPERATURE: 97.1 F | SYSTOLIC BLOOD PRESSURE: 152 MMHG | HEART RATE: 75 BPM | RESPIRATION RATE: 18 BRPM | OXYGEN SATURATION: 100 % | DIASTOLIC BLOOD PRESSURE: 77 MMHG

## 2022-01-01 VITALS
TEMPERATURE: 98.2 F | OXYGEN SATURATION: 100 % | RESPIRATION RATE: 18 BRPM | HEART RATE: 68 BPM | DIASTOLIC BLOOD PRESSURE: 74 MMHG | SYSTOLIC BLOOD PRESSURE: 125 MMHG

## 2022-01-01 VITALS
RESPIRATION RATE: 18 BRPM | OXYGEN SATURATION: 100 % | DIASTOLIC BLOOD PRESSURE: 76 MMHG | HEART RATE: 56 BPM | SYSTOLIC BLOOD PRESSURE: 131 MMHG | TEMPERATURE: 97.4 F

## 2022-01-01 VITALS
HEART RATE: 67 BPM | OXYGEN SATURATION: 100 % | TEMPERATURE: 97.6 F | SYSTOLIC BLOOD PRESSURE: 107 MMHG | RESPIRATION RATE: 18 BRPM | DIASTOLIC BLOOD PRESSURE: 66 MMHG

## 2022-01-01 VITALS
HEIGHT: 73 IN | DIASTOLIC BLOOD PRESSURE: 68 MMHG | SYSTOLIC BLOOD PRESSURE: 117 MMHG | OXYGEN SATURATION: 100 % | WEIGHT: 200 LBS | TEMPERATURE: 97.3 F | RESPIRATION RATE: 18 BRPM | HEART RATE: 73 BPM | BODY MASS INDEX: 26.51 KG/M2

## 2022-01-01 VITALS
HEART RATE: 72 BPM | SYSTOLIC BLOOD PRESSURE: 160 MMHG | RESPIRATION RATE: 18 BRPM | DIASTOLIC BLOOD PRESSURE: 79 MMHG | OXYGEN SATURATION: 99 % | TEMPERATURE: 97.9 F

## 2022-01-01 VITALS
DIASTOLIC BLOOD PRESSURE: 62 MMHG | SYSTOLIC BLOOD PRESSURE: 112 MMHG | HEIGHT: 73 IN | RESPIRATION RATE: 20 BRPM | TEMPERATURE: 97.8 F | BODY MASS INDEX: 25.31 KG/M2 | HEART RATE: 79 BPM | WEIGHT: 191 LBS

## 2022-01-01 VITALS
RESPIRATION RATE: 18 BRPM | HEART RATE: 72 BPM | SYSTOLIC BLOOD PRESSURE: 135 MMHG | OXYGEN SATURATION: 99 % | TEMPERATURE: 97 F | DIASTOLIC BLOOD PRESSURE: 73 MMHG

## 2022-01-01 VITALS
HEART RATE: 77 BPM | DIASTOLIC BLOOD PRESSURE: 56 MMHG | TEMPERATURE: 98.4 F | OXYGEN SATURATION: 98 % | SYSTOLIC BLOOD PRESSURE: 103 MMHG | RESPIRATION RATE: 20 BRPM

## 2022-01-01 VITALS — WEIGHT: 202 LBS | HEIGHT: 73 IN | BODY MASS INDEX: 26.77 KG/M2

## 2022-01-01 VITALS — RESPIRATION RATE: 18 BRPM

## 2022-01-01 VITALS
HEIGHT: 73 IN | SYSTOLIC BLOOD PRESSURE: 112 MMHG | WEIGHT: 191 LBS | BODY MASS INDEX: 25.31 KG/M2 | TEMPERATURE: 97.8 F | RESPIRATION RATE: 20 BRPM | HEART RATE: 79 BPM | OXYGEN SATURATION: 99 % | DIASTOLIC BLOOD PRESSURE: 62 MMHG

## 2022-01-01 VITALS
TEMPERATURE: 97 F | SYSTOLIC BLOOD PRESSURE: 113 MMHG | HEART RATE: 64 BPM | RESPIRATION RATE: 20 BRPM | OXYGEN SATURATION: 100 % | DIASTOLIC BLOOD PRESSURE: 64 MMHG

## 2022-01-01 VITALS
HEART RATE: 76 BPM | SYSTOLIC BLOOD PRESSURE: 114 MMHG | DIASTOLIC BLOOD PRESSURE: 68 MMHG | OXYGEN SATURATION: 96 % | TEMPERATURE: 98.8 F | HEIGHT: 73 IN | BODY MASS INDEX: 25.98 KG/M2 | RESPIRATION RATE: 20 BRPM | WEIGHT: 196 LBS

## 2022-01-01 VITALS
TEMPERATURE: 97 F | WEIGHT: 191 LBS | HEART RATE: 87 BPM | DIASTOLIC BLOOD PRESSURE: 63 MMHG | HEIGHT: 73 IN | SYSTOLIC BLOOD PRESSURE: 120 MMHG | OXYGEN SATURATION: 94 % | BODY MASS INDEX: 25.31 KG/M2

## 2022-01-01 VITALS
OXYGEN SATURATION: 95 % | DIASTOLIC BLOOD PRESSURE: 79 MMHG | SYSTOLIC BLOOD PRESSURE: 149 MMHG | HEART RATE: 61 BPM | TEMPERATURE: 97.8 F | RESPIRATION RATE: 20 BRPM

## 2022-01-01 VITALS
SYSTOLIC BLOOD PRESSURE: 127 MMHG | HEART RATE: 70 BPM | DIASTOLIC BLOOD PRESSURE: 71 MMHG | DIASTOLIC BLOOD PRESSURE: 62 MMHG | RESPIRATION RATE: 18 BRPM | SYSTOLIC BLOOD PRESSURE: 104 MMHG | OXYGEN SATURATION: 96 % | HEART RATE: 85 BPM | RESPIRATION RATE: 16 BRPM | TEMPERATURE: 98 F | TEMPERATURE: 97.8 F | OXYGEN SATURATION: 95 %

## 2022-01-01 VITALS
DIASTOLIC BLOOD PRESSURE: 65 MMHG | HEIGHT: 72 IN | SYSTOLIC BLOOD PRESSURE: 138 MMHG | TEMPERATURE: 99 F | HEART RATE: 66 BPM | OXYGEN SATURATION: 66 % | RESPIRATION RATE: 16 BRPM | WEIGHT: 207.9 LBS

## 2022-01-01 VITALS
DIASTOLIC BLOOD PRESSURE: 71 MMHG | HEIGHT: 73 IN | TEMPERATURE: 98.2 F | BODY MASS INDEX: 25.18 KG/M2 | OXYGEN SATURATION: 100 % | HEART RATE: 73 BPM | SYSTOLIC BLOOD PRESSURE: 135 MMHG | WEIGHT: 190 LBS

## 2022-01-01 VITALS
DIASTOLIC BLOOD PRESSURE: 67 MMHG | OXYGEN SATURATION: 100 % | WEIGHT: 203 LBS | HEIGHT: 73 IN | TEMPERATURE: 97.3 F | SYSTOLIC BLOOD PRESSURE: 127 MMHG | RESPIRATION RATE: 18 BRPM | BODY MASS INDEX: 26.9 KG/M2 | HEART RATE: 73 BPM

## 2022-01-01 VITALS
HEART RATE: 65 BPM | HEIGHT: 72 IN | TEMPERATURE: 98 F | SYSTOLIC BLOOD PRESSURE: 122 MMHG | RESPIRATION RATE: 16 BRPM | DIASTOLIC BLOOD PRESSURE: 59 MMHG | OXYGEN SATURATION: 100 %

## 2022-01-01 VITALS
SYSTOLIC BLOOD PRESSURE: 119 MMHG | OXYGEN SATURATION: 98 % | HEART RATE: 93 BPM | OXYGEN SATURATION: 96 % | DIASTOLIC BLOOD PRESSURE: 63 MMHG | RESPIRATION RATE: 20 BRPM | HEART RATE: 64 BPM | SYSTOLIC BLOOD PRESSURE: 135 MMHG | DIASTOLIC BLOOD PRESSURE: 61 MMHG | TEMPERATURE: 97.5 F | RESPIRATION RATE: 16 BRPM

## 2022-01-01 VITALS
OXYGEN SATURATION: 99 % | DIASTOLIC BLOOD PRESSURE: 74 MMHG | HEART RATE: 73 BPM | SYSTOLIC BLOOD PRESSURE: 138 MMHG | TEMPERATURE: 99.2 F | RESPIRATION RATE: 18 BRPM

## 2022-01-01 VITALS
OXYGEN SATURATION: 98 % | RESPIRATION RATE: 17 BRPM | TEMPERATURE: 98 F | HEART RATE: 85 BPM | DIASTOLIC BLOOD PRESSURE: 53 MMHG | SYSTOLIC BLOOD PRESSURE: 91 MMHG

## 2022-01-01 VITALS
RESPIRATION RATE: 20 BRPM | OXYGEN SATURATION: 99 % | TEMPERATURE: 97.6 F | SYSTOLIC BLOOD PRESSURE: 122 MMHG | HEART RATE: 67 BPM | DIASTOLIC BLOOD PRESSURE: 59 MMHG

## 2022-01-01 VITALS
TEMPERATURE: 97.4 F | DIASTOLIC BLOOD PRESSURE: 70 MMHG | HEART RATE: 63 BPM | SYSTOLIC BLOOD PRESSURE: 138 MMHG | OXYGEN SATURATION: 99 % | RESPIRATION RATE: 18 BRPM

## 2022-01-01 VITALS
TEMPERATURE: 98.4 F | HEART RATE: 66 BPM | RESPIRATION RATE: 20 BRPM | OXYGEN SATURATION: 100 % | DIASTOLIC BLOOD PRESSURE: 75 MMHG | SYSTOLIC BLOOD PRESSURE: 146 MMHG

## 2022-01-01 VITALS
RESPIRATION RATE: 20 BRPM | OXYGEN SATURATION: 100 % | TEMPERATURE: 98.1 F | HEART RATE: 64 BPM | DIASTOLIC BLOOD PRESSURE: 75 MMHG | SYSTOLIC BLOOD PRESSURE: 122 MMHG

## 2022-01-01 VITALS
RESPIRATION RATE: 20 BRPM | HEART RATE: 64 BPM | TEMPERATURE: 97.8 F | OXYGEN SATURATION: 100 % | SYSTOLIC BLOOD PRESSURE: 142 MMHG | DIASTOLIC BLOOD PRESSURE: 76 MMHG

## 2022-01-01 VITALS
SYSTOLIC BLOOD PRESSURE: 135 MMHG | HEART RATE: 69 BPM | OXYGEN SATURATION: 100 % | TEMPERATURE: 97.8 F | RESPIRATION RATE: 18 BRPM | DIASTOLIC BLOOD PRESSURE: 76 MMHG

## 2022-01-01 VITALS
OXYGEN SATURATION: 100 % | HEART RATE: 81 BPM | DIASTOLIC BLOOD PRESSURE: 76 MMHG | SYSTOLIC BLOOD PRESSURE: 143 MMHG | TEMPERATURE: 98.9 F | RESPIRATION RATE: 20 BRPM

## 2022-01-01 VITALS
SYSTOLIC BLOOD PRESSURE: 114 MMHG | RESPIRATION RATE: 20 BRPM | HEART RATE: 61 BPM | HEART RATE: 75 BPM | SYSTOLIC BLOOD PRESSURE: 103 MMHG | OXYGEN SATURATION: 100 % | TEMPERATURE: 97.6 F | OXYGEN SATURATION: 98 % | RESPIRATION RATE: 18 BRPM | TEMPERATURE: 98.4 F | DIASTOLIC BLOOD PRESSURE: 61 MMHG | DIASTOLIC BLOOD PRESSURE: 67 MMHG

## 2022-01-01 VITALS
HEART RATE: 65 BPM | OXYGEN SATURATION: 96 % | TEMPERATURE: 97.6 F | RESPIRATION RATE: 20 BRPM | SYSTOLIC BLOOD PRESSURE: 145 MMHG | DIASTOLIC BLOOD PRESSURE: 71 MMHG

## 2022-01-01 VITALS
HEART RATE: 70 BPM | DIASTOLIC BLOOD PRESSURE: 77 MMHG | OXYGEN SATURATION: 100 % | RESPIRATION RATE: 20 BRPM | SYSTOLIC BLOOD PRESSURE: 144 MMHG | TEMPERATURE: 98.4 F

## 2022-01-01 VITALS — SYSTOLIC BLOOD PRESSURE: 87 MMHG | DIASTOLIC BLOOD PRESSURE: 41 MMHG

## 2022-01-01 VITALS
TEMPERATURE: 97.8 F | SYSTOLIC BLOOD PRESSURE: 112 MMHG | HEART RATE: 62 BPM | OXYGEN SATURATION: 98 % | RESPIRATION RATE: 20 BRPM | DIASTOLIC BLOOD PRESSURE: 62 MMHG

## 2022-01-01 VITALS
DIASTOLIC BLOOD PRESSURE: 62 MMHG | OXYGEN SATURATION: 100 % | RESPIRATION RATE: 20 BRPM | HEART RATE: 71 BPM | SYSTOLIC BLOOD PRESSURE: 119 MMHG | TEMPERATURE: 98.6 F

## 2022-01-01 VITALS
RESPIRATION RATE: 18 BRPM | TEMPERATURE: 98.1 F | DIASTOLIC BLOOD PRESSURE: 72 MMHG | HEART RATE: 61 BPM | SYSTOLIC BLOOD PRESSURE: 146 MMHG | OXYGEN SATURATION: 99 %

## 2022-01-01 VITALS
TEMPERATURE: 97.7 F | OXYGEN SATURATION: 99 % | DIASTOLIC BLOOD PRESSURE: 65 MMHG | RESPIRATION RATE: 18 BRPM | HEART RATE: 60 BPM | SYSTOLIC BLOOD PRESSURE: 119 MMHG

## 2022-01-01 VITALS
RESPIRATION RATE: 20 BRPM | DIASTOLIC BLOOD PRESSURE: 79 MMHG | HEART RATE: 76 BPM | SYSTOLIC BLOOD PRESSURE: 153 MMHG | TEMPERATURE: 98.7 F | OXYGEN SATURATION: 100 %

## 2022-01-01 VITALS
RESPIRATION RATE: 18 BRPM | DIASTOLIC BLOOD PRESSURE: 83 MMHG | OXYGEN SATURATION: 99 % | HEART RATE: 75 BPM | TEMPERATURE: 98.2 F | SYSTOLIC BLOOD PRESSURE: 147 MMHG

## 2022-01-01 VITALS
RESPIRATION RATE: 18 BRPM | SYSTOLIC BLOOD PRESSURE: 129 MMHG | HEART RATE: 71 BPM | TEMPERATURE: 97.4 F | DIASTOLIC BLOOD PRESSURE: 57 MMHG | OXYGEN SATURATION: 99 %

## 2022-01-01 VITALS
HEART RATE: 69 BPM | DIASTOLIC BLOOD PRESSURE: 66 MMHG | OXYGEN SATURATION: 97 % | TEMPERATURE: 97.9 F | WEIGHT: 200 LBS | HEIGHT: 73 IN | BODY MASS INDEX: 26.51 KG/M2 | SYSTOLIC BLOOD PRESSURE: 88 MMHG

## 2022-01-01 VITALS
TEMPERATURE: 98.2 F | SYSTOLIC BLOOD PRESSURE: 151 MMHG | OXYGEN SATURATION: 99 % | HEART RATE: 62 BPM | DIASTOLIC BLOOD PRESSURE: 83 MMHG | RESPIRATION RATE: 20 BRPM

## 2022-01-01 VITALS
TEMPERATURE: 97.8 F | HEART RATE: 76 BPM | RESPIRATION RATE: 18 BRPM | SYSTOLIC BLOOD PRESSURE: 137 MMHG | DIASTOLIC BLOOD PRESSURE: 60 MMHG | OXYGEN SATURATION: 100 %
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
## 2022-01-01 DIAGNOSIS — Z98.89 OTHER SPECIFIED POSTPROCEDURAL STATES: Chronic | ICD-10-CM

## 2022-01-01 DIAGNOSIS — L59.8 OTHER SPECIFIED DISORDERS OF THE SKIN AND SUBCUTANEOUS TISSUE RELATED TO RADIATION: ICD-10-CM

## 2022-01-01 DIAGNOSIS — Z95.810 PRESENCE OF AUTOMATIC (IMPLANTABLE) CARDIAC DEFIBRILLATOR: Chronic | ICD-10-CM

## 2022-01-01 DIAGNOSIS — Z90.410 ACQUIRED TOTAL ABSENCE OF PANCREAS: Chronic | ICD-10-CM

## 2022-01-01 DIAGNOSIS — Z79.51 LONG TERM (CURRENT) USE OF INHALED STEROIDS: ICD-10-CM

## 2022-01-01 DIAGNOSIS — Z85.07 PERSONAL HISTORY OF MALIGNANT NEOPLASM OF PANCREAS: ICD-10-CM

## 2022-01-01 DIAGNOSIS — Z96.60 PRESENCE OF UNSPECIFIED ORTHOPEDIC JOINT IMPLANT: Chronic | ICD-10-CM

## 2022-01-01 DIAGNOSIS — Z89.421 ACQUIRED ABSENCE OF OTHER RIGHT TOE(S): Chronic | ICD-10-CM

## 2022-01-01 DIAGNOSIS — Z79.2 LONG TERM (CURRENT) USE OF ANTIBIOTICS: ICD-10-CM

## 2022-01-01 DIAGNOSIS — Z87.81 PERSONAL HISTORY OF (HEALED) TRAUMATIC FRACTURE: ICD-10-CM

## 2022-01-01 DIAGNOSIS — D29.20 BENIGN NEOPLASM OF UNSPECIFIED TESTIS: Chronic | ICD-10-CM

## 2022-01-01 DIAGNOSIS — Z94.5 SKIN TRANSPLANT STATUS: Chronic | ICD-10-CM

## 2022-01-01 DIAGNOSIS — Z95.2 PRESENCE OF PROSTHETIC HEART VALVE: Chronic | ICD-10-CM

## 2022-01-01 DIAGNOSIS — S00.81XA ABRASION OF OTHER PART OF HEAD, INITIAL ENCOUNTER: ICD-10-CM

## 2022-01-01 DIAGNOSIS — Z87.81 PERSONAL HISTORY OF (HEALED) TRAUMATIC FRACTURE: Chronic | ICD-10-CM

## 2022-01-01 DIAGNOSIS — S31.109A UNSPECIFIED OPEN WOUND OF ABDOMINAL WALL, UNSPECIFIED QUADRANT WITHOUT PENETRATION INTO PERITONEAL CAVITY, INITIAL ENCOUNTER: ICD-10-CM

## 2022-01-01 DIAGNOSIS — K43.2 INCISIONAL HERNIA W/OUT OBSTRUCTION OR GANGRENE: ICD-10-CM

## 2022-01-01 DIAGNOSIS — S31.109D UNSPECIFIED OPEN WOUND OF ABDOMINAL WALL, UNSPECIFIED QUADRANT WITHOUT PENETRATION INTO PERITONEAL CAVITY, SUBSEQUENT ENCOUNTER: ICD-10-CM

## 2022-01-01 DIAGNOSIS — Z95.810 PRESENCE OF AUTOMATIC (IMPLANTABLE) CARDIAC DEFIBRILLATOR: ICD-10-CM

## 2022-01-01 DIAGNOSIS — L08.9 LOCAL INFECTION OF THE SKIN AND SUBCUTANEOUS TISSUE, UNSPECIFIED: ICD-10-CM

## 2022-01-01 DIAGNOSIS — I50.9 HEART FAILURE, UNSPECIFIED: ICD-10-CM
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
## 2022-01-01 DIAGNOSIS — M48.00 SPINAL STENOSIS, SITE UNSPECIFIED: ICD-10-CM

## 2022-01-01 DIAGNOSIS — Z89.411 ACQUIRED ABSENCE OF RIGHT GREAT TOE: ICD-10-CM

## 2022-01-01 DIAGNOSIS — H40.9 UNSPECIFIED GLAUCOMA: ICD-10-CM

## 2022-01-01 DIAGNOSIS — L97.511 NON-PRESSURE CHRONIC ULCER OF OTHER PART OF RIGHT FOOT LIMITED TO BREAKDOWN OF SKIN: ICD-10-CM

## 2022-01-01 DIAGNOSIS — Z96.649 PRESENCE OF UNSPECIFIED ARTIFICIAL HIP JOINT: ICD-10-CM

## 2022-01-01 DIAGNOSIS — Z95.4 PRESENCE OF OTHER HEART-VALVE REPLACEMENT: ICD-10-CM

## 2022-01-01 DIAGNOSIS — Z79.899 OTHER LONG TERM (CURRENT) DRUG THERAPY: ICD-10-CM

## 2022-01-01 DIAGNOSIS — E78.5 HYPERLIPIDEMIA, UNSPECIFIED: ICD-10-CM

## 2022-01-01 DIAGNOSIS — E78.00 PURE HYPERCHOLESTEROLEMIA, UNSPECIFIED: ICD-10-CM

## 2022-01-01 DIAGNOSIS — Z98.890 OTHER SPECIFIED POSTPROCEDURAL STATES: ICD-10-CM

## 2022-01-01 DIAGNOSIS — A41.9 SEPSIS, UNSPECIFIED ORGANISM: ICD-10-CM

## 2022-01-01 DIAGNOSIS — C25.9 MALIGNANT NEOPLASM OF PANCREAS, UNSPECIFIED: ICD-10-CM

## 2022-01-01 DIAGNOSIS — I26.99 OTHER PULMONARY EMBOLISM WITHOUT ACUTE COR PULMONALE: ICD-10-CM
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
## 2022-01-01 DIAGNOSIS — Z20.822 CONTACT WITH AND (SUSPECTED) EXPOSURE TO COVID-19: ICD-10-CM

## 2022-01-01 DIAGNOSIS — Z91.89 OTHER SPECIFIED PERSONAL RISK FACTORS, NOT ELSEWHERE CLASSIFIED: ICD-10-CM

## 2022-01-01 DIAGNOSIS — G62.9 POLYNEUROPATHY, UNSPECIFIED: ICD-10-CM

## 2022-01-01 DIAGNOSIS — H35.30 UNSPECIFIED MACULAR DEGENERATION: ICD-10-CM

## 2022-01-01 DIAGNOSIS — M20.42 OTHER HAMMER TOE(S) (ACQUIRED), LEFT FOOT: ICD-10-CM

## 2022-01-01 DIAGNOSIS — I10 ESSENTIAL (PRIMARY) HYPERTENSION: ICD-10-CM

## 2022-01-01 DIAGNOSIS — G93.41 METABOLIC ENCEPHALOPATHY: ICD-10-CM

## 2022-01-01 DIAGNOSIS — I95.1 ORTHOSTATIC HYPOTENSION: ICD-10-CM

## 2022-01-01 DIAGNOSIS — G47.00 INSOMNIA, UNSPECIFIED: ICD-10-CM

## 2022-01-01 DIAGNOSIS — K46.9 UNSPECIFIED ABDOMINAL HERNIA W/OUT OBSTRUCTION OR GANGRENE: ICD-10-CM

## 2022-01-01 DIAGNOSIS — T81.89XA OTHER COMPLICATIONS OF PROCEDURES, NOT ELSEWHERE CLASSIFIED, INITIAL ENCOUNTER: ICD-10-CM

## 2022-01-01 DIAGNOSIS — N40.0 BENIGN PROSTATIC HYPERPLASIA WITHOUT LOWER URINARY TRACT SYMPTOMS: ICD-10-CM

## 2022-01-01 DIAGNOSIS — K21.9 GASTRO-ESOPHAGEAL REFLUX DISEASE WITHOUT ESOPHAGITIS: ICD-10-CM

## 2022-01-01 DIAGNOSIS — E11.621 TYPE 2 DIABETES MELLITUS WITH FOOT ULCER: ICD-10-CM

## 2022-01-01 DIAGNOSIS — S01.112A LACERATION W/OUT FOREIGN BODY OF LEFT EYELID AND PERIOCULAR AREA, INITIAL ENCOUNTER: ICD-10-CM

## 2022-01-01 DIAGNOSIS — R11.0 NAUSEA: ICD-10-CM

## 2022-01-01 DIAGNOSIS — Z29.9 ENCOUNTER FOR PROPHYLACTIC MEASURES, UNSPECIFIED: ICD-10-CM

## 2022-01-01 DIAGNOSIS — Z79.01 LONG TERM (CURRENT) USE OF ANTICOAGULANTS: ICD-10-CM

## 2022-01-01 DIAGNOSIS — I47.2 VENTRICULAR TACHYCARDIA: ICD-10-CM

## 2022-01-01 DIAGNOSIS — E03.9 HYPOTHYROIDISM, UNSPECIFIED: ICD-10-CM

## 2022-01-01 DIAGNOSIS — Z86.59 PERSONAL HISTORY OF OTHER MENTAL AND BEHAVIORAL DISORDERS: ICD-10-CM

## 2022-01-01 DIAGNOSIS — T81.89XD OTHER COMPLICATIONS OF PROCEDURES, NOT ELSEWHERE CLASSIFIED, SUBSEQUENT ENCOUNTER: ICD-10-CM

## 2022-01-01 DIAGNOSIS — M20.41 OTHER HAMMER TOE(S) (ACQUIRED), RIGHT FOOT: ICD-10-CM

## 2022-01-01 DIAGNOSIS — L97.801 NON-PRESSURE CHRONIC ULCER OF OTHER PART OF UNSPECIFIED LOWER LEG LIMITED TO BREAKDOWN OF SKIN: ICD-10-CM

## 2022-01-01 DIAGNOSIS — I50.22 CHRONIC SYSTOLIC (CONGESTIVE) HEART FAILURE: ICD-10-CM

## 2022-01-01 DIAGNOSIS — L60.3 NAIL DYSTROPHY: ICD-10-CM
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
## 2022-01-01 DIAGNOSIS — M86.9 OSTEOMYELITIS, UNSPECIFIED: ICD-10-CM

## 2022-01-01 DIAGNOSIS — Z80.42 FAMILY HISTORY OF MALIGNANT NEOPLASM OF PROSTATE: ICD-10-CM

## 2022-01-01 DIAGNOSIS — Z87.438 PERSONAL HISTORY OF OTHER DISEASES OF MALE GENITAL ORGANS: ICD-10-CM

## 2022-01-01 DIAGNOSIS — M77.8 OTHER ENTHESOPATHIES, NOT ELSEWHERE CLASSIFIED: ICD-10-CM
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
## 2022-01-01 DIAGNOSIS — L97.519 NON-PRESSURE CHRONIC ULCER OF OTHER PART OF RIGHT FOOT WITH UNSPECIFIED SEVERITY: ICD-10-CM

## 2022-01-01 DIAGNOSIS — R09.89 OTHER SPECIFIED SYMPTOMS AND SIGNS INVOLVING THE CIRCULATORY AND RESPIRATORY SYSTEMS: ICD-10-CM

## 2022-01-01 DIAGNOSIS — E11.9 TYPE 2 DIABETES MELLITUS WITHOUT COMPLICATIONS: ICD-10-CM

## 2022-01-01 DIAGNOSIS — S41.011A LACERATION WITHOUT FOREIGN BODY OF RIGHT SHOULDER, INITIAL ENCOUNTER: ICD-10-CM

## 2022-01-01 DIAGNOSIS — I48.91 UNSPECIFIED ATRIAL FIBRILLATION: ICD-10-CM

## 2022-01-01 DIAGNOSIS — I81 PORTAL VEIN THROMBOSIS: ICD-10-CM

## 2022-01-01 DIAGNOSIS — D64.9 ANEMIA, UNSPECIFIED: ICD-10-CM

## 2022-01-01 DIAGNOSIS — I95.9 HYPOTENSION, UNSPECIFIED: ICD-10-CM

## 2022-01-01 DIAGNOSIS — L08.9: ICD-10-CM

## 2022-01-01 DIAGNOSIS — R78.81 BACTEREMIA: ICD-10-CM

## 2022-01-01 DIAGNOSIS — Z79.890 HORMONE REPLACEMENT THERAPY: ICD-10-CM

## 2022-01-01 DIAGNOSIS — M19.011 PRIMARY OSTEOARTHRITIS, RIGHT SHOULDER: ICD-10-CM
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
## 2022-01-01 DIAGNOSIS — R68.83 CHILLS (WITHOUT FEVER): ICD-10-CM
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
## 2022-01-01 DIAGNOSIS — S40.211D ABRASION OF RIGHT SHOULDER, SUBSEQUENT ENCOUNTER: ICD-10-CM

## 2022-01-01 DIAGNOSIS — T81.9XXA UNSPECIFIED COMPLICATION OF PROCEDURE, INITIAL ENCOUNTER: ICD-10-CM

## 2022-01-01 DIAGNOSIS — R11.2 NAUSEA WITH VOMITING, UNSPECIFIED: ICD-10-CM
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
## 2022-01-01 DIAGNOSIS — S90.414A ABRASION, RIGHT LESSER TOE(S), INITIAL ENCOUNTER: ICD-10-CM

## 2022-01-01 DIAGNOSIS — S41.011A: ICD-10-CM

## 2022-01-01 DIAGNOSIS — S90.414D: ICD-10-CM

## 2022-01-01 DIAGNOSIS — S31.109D UNSPECIFIED OPEN WOUND OF ABDOMINAL WALL, UNSPECIFIED QUADRANT W/OUT PENETRATION INTO PERITONEAL CAVITY, SUBSEQUENT ENCOUNTER: ICD-10-CM

## 2022-01-01 DIAGNOSIS — R19.7 DIARRHEA, UNSPECIFIED: ICD-10-CM

## 2022-01-01 DIAGNOSIS — C80.1 MALIGNANT (PRIMARY) NEOPLASM, UNSPECIFIED: ICD-10-CM

## 2022-01-01 DIAGNOSIS — E11.52 TYPE 2 DIABETES MELLITUS WITH DIABETIC PERIPHERAL ANGIOPATHY WITH GANGRENE: ICD-10-CM

## 2022-01-01 DIAGNOSIS — M20.5X2 OTHER DEFORMITIES OF TOE(S) (ACQUIRED), LEFT FOOT: ICD-10-CM

## 2022-01-01 DIAGNOSIS — Z00.00 ENCOUNTER FOR GENERAL ADULT MEDICAL EXAMINATION W/OUT ABNORMAL FINDINGS: ICD-10-CM

## 2022-01-01 DIAGNOSIS — M25.50 PAIN IN UNSPECIFIED JOINT: ICD-10-CM

## 2022-01-01 DIAGNOSIS — S40.211D: ICD-10-CM

## 2022-01-01 DIAGNOSIS — S43.491A OTHER SPRAIN OF RIGHT SHOULDER JOINT, INITIAL ENCOUNTER: ICD-10-CM

## 2022-01-01 DIAGNOSIS — F41.9 ANXIETY DISORDER, UNSPECIFIED: ICD-10-CM

## 2022-01-01 DIAGNOSIS — S01.112A LACERATION WITHOUT FOREIGN BODY OF LEFT EYELID AND PERIOCULAR AREA, INITIAL ENCOUNTER: ICD-10-CM

## 2022-01-01 DIAGNOSIS — G89.3 NEOPLASM RELATED PAIN (ACUTE) (CHRONIC): ICD-10-CM

## 2022-01-01 DIAGNOSIS — Z51.5 ENCOUNTER FOR PALLIATIVE CARE: ICD-10-CM

## 2022-01-01 DIAGNOSIS — R20.0 ANESTHESIA OF SKIN: ICD-10-CM

## 2022-01-01 DIAGNOSIS — E86.0 DEHYDRATION: ICD-10-CM

## 2022-01-01 DIAGNOSIS — S91.309A UNSPECIFIED OPEN WOUND, UNSPECIFIED FOOT, INITIAL ENCOUNTER: ICD-10-CM

## 2022-01-01 LAB
-  AMIKACIN: SIGNIFICANT CHANGE UP
-  AMOXICILLIN/CLAVULANIC ACID: SIGNIFICANT CHANGE UP
-  AMOXICILLIN/CLAVULANIC ACID: SIGNIFICANT CHANGE UP
-  AMPICILLIN/SULBACTAM: SIGNIFICANT CHANGE UP
-  AMPICILLIN: SIGNIFICANT CHANGE UP
-  AZTREONAM: SIGNIFICANT CHANGE UP
-  CEFAZOLIN: SIGNIFICANT CHANGE UP
-  CEFEPIME: SIGNIFICANT CHANGE UP
-  CEFOTAXIME: SIGNIFICANT CHANGE UP
-  CEFOXITIN: SIGNIFICANT CHANGE UP
-  CEFTAZIDIME: SIGNIFICANT CHANGE UP
-  CEFTRIAXONE: SIGNIFICANT CHANGE UP
-  CEFUROXIME: SIGNIFICANT CHANGE UP
-  CIPROFLOXACIN: SIGNIFICANT CHANGE UP
-  CLINDAMYCIN: SIGNIFICANT CHANGE UP
-  ERTAPENEM: SIGNIFICANT CHANGE UP
-  ERYTHROMYCIN: SIGNIFICANT CHANGE UP
-  GENTAMICIN: SIGNIFICANT CHANGE UP
-  IMIPENEM: SIGNIFICANT CHANGE UP
-  LEVOFLOXACIN: SIGNIFICANT CHANGE UP
-  MEROPENEM: SIGNIFICANT CHANGE UP
-  MINOCYCLINE: SIGNIFICANT CHANGE UP
-  PENICILLIN: SIGNIFICANT CHANGE UP
-  PIPERACILLIN/TAZOBACTAM: SIGNIFICANT CHANGE UP
-  STREPTOCOCCUS SP. (NOT GRP A, B OR S PNEUMONIAE): SIGNIFICANT CHANGE UP
-  TETRACYCLINE: SIGNIFICANT CHANGE UP
-  TIGECYCLINE: SIGNIFICANT CHANGE UP
-  TOBRAMYCIN: SIGNIFICANT CHANGE UP
-  TRIMETHOPRIM/SULFAMETHOXAZOLE: SIGNIFICANT CHANGE UP
-  VANCOMYCIN: SIGNIFICANT CHANGE UP
-  VANCOMYCIN: SIGNIFICANT CHANGE UP
25(OH)D3 SERPL-MCNC: 30.4 NG/ML
25(OH)D3 SERPL-MCNC: 33.7 NG/ML
25(OH)D3 SERPL-MCNC: 34.7 NG/ML
A1C WITH ESTIMATED AVERAGE GLUCOSE RESULT: 5.8 % — HIGH (ref 4–5.6)
ALBUMIN SERPL ELPH-MCNC: 1.8 G/DL — LOW (ref 3.3–5)
ALBUMIN SERPL ELPH-MCNC: 1.8 G/DL — LOW (ref 3.3–5)
ALBUMIN SERPL ELPH-MCNC: 1.9 G/DL — LOW (ref 3.3–5)
ALBUMIN SERPL ELPH-MCNC: 1.9 G/DL — LOW (ref 3.3–5)
ALBUMIN SERPL ELPH-MCNC: 2.1 G/DL — LOW (ref 3.3–5)
ALBUMIN SERPL ELPH-MCNC: 2.1 G/DL — LOW (ref 3.3–5)
ALBUMIN SERPL ELPH-MCNC: 2.3 G/DL — LOW (ref 3.3–5)
ALBUMIN SERPL ELPH-MCNC: 2.4 G/DL — LOW (ref 3.3–5)
ALBUMIN SERPL ELPH-MCNC: 2.4 G/DL — LOW (ref 3.3–5)
ALBUMIN SERPL ELPH-MCNC: 2.5 G/DL — LOW (ref 3.3–5)
ALBUMIN SERPL ELPH-MCNC: 2.5 G/DL — LOW (ref 3.3–5)
ALBUMIN SERPL ELPH-MCNC: 2.7 G/DL — LOW (ref 3.3–5)
ALBUMIN SERPL ELPH-MCNC: 2.8 G/DL — LOW (ref 3.3–5)
ALBUMIN SERPL ELPH-MCNC: 2.8 G/DL — LOW (ref 3.3–5)
ALBUMIN SERPL ELPH-MCNC: 2.9 G/DL — LOW (ref 3.3–5)
ALBUMIN SERPL ELPH-MCNC: 3.2 G/DL — LOW (ref 3.3–5)
ALBUMIN SERPL ELPH-MCNC: 3.2 G/DL — LOW (ref 3.3–5)
ALBUMIN SERPL ELPH-MCNC: 3.6 G/DL — SIGNIFICANT CHANGE UP (ref 3.3–5)
ALBUMIN SERPL ELPH-MCNC: 3.9 G/DL
ALBUMIN SERPL ELPH-MCNC: 4 G/DL
ALBUMIN SERPL ELPH-MCNC: 4.2 G/DL
ALBUMIN SERPL ELPH-MCNC: 4.2 G/DL
ALBUMIN SERPL ELPH-MCNC: 4.3 G/DL
ALBUMIN SERPL ELPH-MCNC: 4.5 G/DL
ALP BLD-CCNC: 123 U/L
ALP BLD-CCNC: 126 U/L
ALP BLD-CCNC: 132 U/L
ALP BLD-CCNC: 144 U/L
ALP BLD-CCNC: 146 U/L
ALP BLD-CCNC: 87 U/L
ALP SERPL-CCNC: 101 U/L — SIGNIFICANT CHANGE UP (ref 40–120)
ALP SERPL-CCNC: 107 U/L — SIGNIFICANT CHANGE UP (ref 40–120)
ALP SERPL-CCNC: 110 U/L — SIGNIFICANT CHANGE UP (ref 40–120)
ALP SERPL-CCNC: 115 U/L — SIGNIFICANT CHANGE UP (ref 40–120)
ALP SERPL-CCNC: 120 U/L — SIGNIFICANT CHANGE UP (ref 40–120)
ALP SERPL-CCNC: 120 U/L — SIGNIFICANT CHANGE UP (ref 40–120)
ALP SERPL-CCNC: 121 U/L — HIGH (ref 40–120)
ALP SERPL-CCNC: 122 U/L — HIGH (ref 40–120)
ALP SERPL-CCNC: 124 U/L — HIGH (ref 40–120)
ALP SERPL-CCNC: 130 U/L — HIGH (ref 40–120)
ALP SERPL-CCNC: 135 U/L — HIGH (ref 40–120)
ALP SERPL-CCNC: 149 U/L — HIGH (ref 40–120)
ALP SERPL-CCNC: 166 U/L — HIGH (ref 40–120)
ALP SERPL-CCNC: 223 U/L — HIGH (ref 40–120)
ALP SERPL-CCNC: 266 U/L — HIGH (ref 40–120)
ALP SERPL-CCNC: 285 U/L — HIGH (ref 40–120)
ALP SERPL-CCNC: 305 U/L — HIGH (ref 40–120)
ALP SERPL-CCNC: 321 U/L — HIGH (ref 40–120)
ALP SERPL-CCNC: 95 U/L — SIGNIFICANT CHANGE UP (ref 40–120)
ALP SERPL-CCNC: 96 U/L — SIGNIFICANT CHANGE UP (ref 40–120)
ALT FLD-CCNC: 10 U/L — SIGNIFICANT CHANGE UP (ref 4–41)
ALT FLD-CCNC: 11 U/L — SIGNIFICANT CHANGE UP (ref 4–41)
ALT FLD-CCNC: 11 U/L — SIGNIFICANT CHANGE UP (ref 4–41)
ALT FLD-CCNC: 13 U/L — SIGNIFICANT CHANGE UP (ref 4–41)
ALT FLD-CCNC: 13 U/L — SIGNIFICANT CHANGE UP (ref 4–41)
ALT FLD-CCNC: 14 U/L — SIGNIFICANT CHANGE UP (ref 4–41)
ALT FLD-CCNC: 16 U/L — SIGNIFICANT CHANGE UP (ref 10–45)
ALT FLD-CCNC: 19 U/L — SIGNIFICANT CHANGE UP (ref 4–41)
ALT FLD-CCNC: 20 U/L — SIGNIFICANT CHANGE UP (ref 4–41)
ALT FLD-CCNC: 21 U/L — SIGNIFICANT CHANGE UP (ref 10–45)
ALT FLD-CCNC: 23 U/L — SIGNIFICANT CHANGE UP (ref 12–78)
ALT FLD-CCNC: 28 U/L — SIGNIFICANT CHANGE UP (ref 10–45)
ALT FLD-CCNC: 348 U/L — HIGH (ref 10–45)
ALT FLD-CCNC: 37 U/L — SIGNIFICANT CHANGE UP (ref 10–45)
ALT FLD-CCNC: 38 U/L — SIGNIFICANT CHANGE UP (ref 10–45)
ALT FLD-CCNC: 39 U/L — SIGNIFICANT CHANGE UP (ref 10–45)
ALT FLD-CCNC: 40 U/L — SIGNIFICANT CHANGE UP (ref 10–45)
ALT FLD-CCNC: 44 U/L — SIGNIFICANT CHANGE UP (ref 10–45)
ALT SERPL-CCNC: 10 U/L
ALT SERPL-CCNC: 11 U/L
ALT SERPL-CCNC: 13 U/L
ALT SERPL-CCNC: 14 U/L
ALT SERPL-CCNC: 14 U/L
ALT SERPL-CCNC: 9 U/L
ANION GAP SERPL CALC-SCNC: 10 MMOL/L — SIGNIFICANT CHANGE UP (ref 5–17)
ANION GAP SERPL CALC-SCNC: 10 MMOL/L — SIGNIFICANT CHANGE UP (ref 7–14)
ANION GAP SERPL CALC-SCNC: 11 MMOL/L
ANION GAP SERPL CALC-SCNC: 11 MMOL/L — SIGNIFICANT CHANGE UP (ref 5–17)
ANION GAP SERPL CALC-SCNC: 11 MMOL/L — SIGNIFICANT CHANGE UP (ref 7–14)
ANION GAP SERPL CALC-SCNC: 12 MMOL/L — SIGNIFICANT CHANGE UP (ref 7–14)
ANION GAP SERPL CALC-SCNC: 13 MMOL/L
ANION GAP SERPL CALC-SCNC: 13 MMOL/L
ANION GAP SERPL CALC-SCNC: 13 MMOL/L — SIGNIFICANT CHANGE UP (ref 5–17)
ANION GAP SERPL CALC-SCNC: 13 MMOL/L — SIGNIFICANT CHANGE UP (ref 5–17)
ANION GAP SERPL CALC-SCNC: 13 MMOL/L — SIGNIFICANT CHANGE UP (ref 7–14)
ANION GAP SERPL CALC-SCNC: 14 MMOL/L
ANION GAP SERPL CALC-SCNC: 14 MMOL/L
ANION GAP SERPL CALC-SCNC: 14 MMOL/L — SIGNIFICANT CHANGE UP (ref 5–17)
ANION GAP SERPL CALC-SCNC: 15 MMOL/L — SIGNIFICANT CHANGE UP (ref 5–17)
ANION GAP SERPL CALC-SCNC: 16 MMOL/L
ANION GAP SERPL CALC-SCNC: 18 MMOL/L — HIGH (ref 5–17)
ANION GAP SERPL CALC-SCNC: 4 MMOL/L — LOW (ref 5–17)
ANION GAP SERPL CALC-SCNC: 5 MMOL/L — SIGNIFICANT CHANGE UP (ref 5–17)
ANION GAP SERPL CALC-SCNC: 6 MMOL/L — SIGNIFICANT CHANGE UP (ref 5–17)
ANION GAP SERPL CALC-SCNC: 7 MMOL/L — SIGNIFICANT CHANGE UP (ref 5–17)
ANION GAP SERPL CALC-SCNC: 7 MMOL/L — SIGNIFICANT CHANGE UP (ref 7–14)
ANION GAP SERPL CALC-SCNC: 7 MMOL/L — SIGNIFICANT CHANGE UP (ref 7–14)
ANION GAP SERPL CALC-SCNC: 8 MMOL/L — SIGNIFICANT CHANGE UP (ref 5–17)
ANION GAP SERPL CALC-SCNC: 8 MMOL/L — SIGNIFICANT CHANGE UP (ref 5–17)
ANION GAP SERPL CALC-SCNC: 8 MMOL/L — SIGNIFICANT CHANGE UP (ref 7–14)
ANION GAP SERPL CALC-SCNC: 8 MMOL/L — SIGNIFICANT CHANGE UP (ref 7–14)
ANION GAP SERPL CALC-SCNC: 9 MMOL/L — SIGNIFICANT CHANGE UP (ref 5–17)
ANION GAP SERPL CALC-SCNC: 9 MMOL/L — SIGNIFICANT CHANGE UP (ref 7–14)
ANION GAP SERPL CALC-SCNC: SIGNIFICANT CHANGE UP MMOL/L (ref 5–17)
ANISOCYTOSIS BLD QL: SIGNIFICANT CHANGE UP
ANISOCYTOSIS BLD QL: SLIGHT — SIGNIFICANT CHANGE UP
ANISOCYTOSIS BLD QL: SLIGHT — SIGNIFICANT CHANGE UP
APPEARANCE UR: ABNORMAL
APPEARANCE UR: ABNORMAL
APPEARANCE UR: CLEAR — SIGNIFICANT CHANGE UP
APPEARANCE: CLEAR
APTT BLD: 106.3 SEC — HIGH (ref 27–36.3)
APTT BLD: 29.1 SEC — SIGNIFICANT CHANGE UP (ref 27.5–35.5)
APTT BLD: 30.5 SEC — SIGNIFICANT CHANGE UP (ref 27–36.3)
APTT BLD: 31.2 SEC — SIGNIFICANT CHANGE UP (ref 27–36.3)
APTT BLD: 31.3 SEC — SIGNIFICANT CHANGE UP (ref 27–36.3)
APTT BLD: 32.4 SEC
APTT BLD: 32.4 SEC — SIGNIFICANT CHANGE UP (ref 27.5–35.5)
APTT BLD: 32.5 SEC — SIGNIFICANT CHANGE UP (ref 27–36.3)
APTT BLD: 32.7 SEC — SIGNIFICANT CHANGE UP (ref 27.5–35.5)
APTT BLD: 33.3 SEC — SIGNIFICANT CHANGE UP (ref 27–36.3)
APTT BLD: 34.1 SEC — SIGNIFICANT CHANGE UP (ref 27.5–35.5)
APTT BLD: 35.1 SEC — SIGNIFICANT CHANGE UP (ref 27–36.3)
APTT BLD: 35.6 SEC — SIGNIFICANT CHANGE UP (ref 27–36.3)
APTT BLD: 36.9 SEC — HIGH (ref 27.5–35.5)
APTT BLD: 37 SEC — HIGH (ref 27.5–35.5)
APTT BLD: 37.2 SEC — HIGH (ref 27.5–35.5)
APTT BLD: 37.2 SEC — HIGH (ref 27.5–35.5)
APTT BLD: 42 SEC — HIGH (ref 27–36.3)
APTT BLD: 42.2 SEC — HIGH (ref 27–36.3)
APTT BLD: 42.2 SEC — HIGH (ref 27–36.3)
APTT BLD: 42.3 SEC — HIGH (ref 27.5–35.5)
APTT BLD: 45 SEC — HIGH (ref 27.5–35.5)
APTT BLD: 97.2 SEC — HIGH (ref 27–36.3)
APTT BLD: 97.7 SEC — HIGH (ref 27–36.3)
APTT BLD: >200 SEC — SIGNIFICANT CHANGE UP (ref 27–36.3)
APTT BLD: >200 SEC — SIGNIFICANT CHANGE UP (ref 27–36.3)
AST SERPL-CCNC: 113 U/L — HIGH (ref 10–40)
AST SERPL-CCNC: 1309 U/L — HIGH (ref 10–40)
AST SERPL-CCNC: 17 U/L
AST SERPL-CCNC: 17 U/L — SIGNIFICANT CHANGE UP (ref 4–40)
AST SERPL-CCNC: 18 U/L
AST SERPL-CCNC: 18 U/L — SIGNIFICANT CHANGE UP (ref 4–40)
AST SERPL-CCNC: 18 U/L — SIGNIFICANT CHANGE UP (ref 4–40)
AST SERPL-CCNC: 19 U/L
AST SERPL-CCNC: 19 U/L — SIGNIFICANT CHANGE UP (ref 4–40)
AST SERPL-CCNC: 20 U/L
AST SERPL-CCNC: 20 U/L
AST SERPL-CCNC: 20 U/L — SIGNIFICANT CHANGE UP (ref 4–40)
AST SERPL-CCNC: 22 U/L
AST SERPL-CCNC: 22 U/L — SIGNIFICANT CHANGE UP (ref 4–40)
AST SERPL-CCNC: 25 U/L — SIGNIFICANT CHANGE UP (ref 15–37)
AST SERPL-CCNC: 28 U/L — SIGNIFICANT CHANGE UP (ref 4–40)
AST SERPL-CCNC: 29 U/L — SIGNIFICANT CHANGE UP (ref 10–40)
AST SERPL-CCNC: 32 U/L — SIGNIFICANT CHANGE UP (ref 10–40)
AST SERPL-CCNC: 35 U/L — SIGNIFICANT CHANGE UP (ref 4–40)
AST SERPL-CCNC: 49 U/L — HIGH (ref 10–40)
AST SERPL-CCNC: 59 U/L — HIGH (ref 10–40)
AST SERPL-CCNC: 65 U/L — HIGH (ref 10–40)
AST SERPL-CCNC: 67 U/L — HIGH (ref 10–40)
AST SERPL-CCNC: 78 U/L — HIGH (ref 10–40)
AST SERPL-CCNC: 82 U/L — HIGH (ref 10–40)
AST SERPL-CCNC: 97 U/L — HIGH (ref 10–40)
B PERT DNA SPEC QL NAA+PROBE: SIGNIFICANT CHANGE UP
B PERT+PARAPERT DNA PNL SPEC NAA+PROBE: SIGNIFICANT CHANGE UP
BACTERIA # UR AUTO: ABNORMAL
BACTERIA # UR AUTO: ABNORMAL
BACTERIA # UR AUTO: NEGATIVE — SIGNIFICANT CHANGE UP
BACTERIA # UR AUTO: NEGATIVE — SIGNIFICANT CHANGE UP
BACTERIA UR CULT: NORMAL
BASE EXCESS BLDA CALC-SCNC: -6 — LOW (ref -2–3)
BASE EXCESS BLDA CALC-SCNC: -6.6 MMOL/L — LOW (ref -2–3)
BASE EXCESS BLDV CALC-SCNC: -0.6 MMOL/L — SIGNIFICANT CHANGE UP (ref -2–3)
BASE EXCESS BLDV CALC-SCNC: -0.8 MMOL/L — SIGNIFICANT CHANGE UP (ref -2–3)
BASE EXCESS BLDV CALC-SCNC: -10.1 MMOL/L — LOW (ref -2–3)
BASE EXCESS BLDV CALC-SCNC: -2 MMOL/L — SIGNIFICANT CHANGE UP (ref -2–3)
BASE EXCESS BLDV CALC-SCNC: -4.1 MMOL/L — LOW (ref -2–3)
BASE EXCESS BLDV CALC-SCNC: -4.6 MMOL/L — LOW (ref -2–3)
BASOPHILS # BLD AUTO: 0 K/UL — SIGNIFICANT CHANGE UP (ref 0–0.2)
BASOPHILS # BLD AUTO: 0.01 K/UL
BASOPHILS # BLD AUTO: 0.01 K/UL
BASOPHILS # BLD AUTO: 0.02 K/UL
BASOPHILS # BLD AUTO: 0.02 K/UL
BASOPHILS # BLD AUTO: 0.02 K/UL — SIGNIFICANT CHANGE UP (ref 0–0.2)
BASOPHILS # BLD AUTO: 0.03 K/UL
BASOPHILS # BLD AUTO: 0.03 K/UL — SIGNIFICANT CHANGE UP (ref 0–0.2)
BASOPHILS # BLD AUTO: 0.09 K/UL — SIGNIFICANT CHANGE UP (ref 0–0.2)
BASOPHILS NFR BLD AUTO: 0 % — SIGNIFICANT CHANGE UP (ref 0–2)
BASOPHILS NFR BLD AUTO: 0.3 %
BASOPHILS NFR BLD AUTO: 0.3 %
BASOPHILS NFR BLD AUTO: 0.3 % — SIGNIFICANT CHANGE UP (ref 0–2)
BASOPHILS NFR BLD AUTO: 0.3 % — SIGNIFICANT CHANGE UP (ref 0–2)
BASOPHILS NFR BLD AUTO: 0.4 %
BASOPHILS NFR BLD AUTO: 0.4 % — SIGNIFICANT CHANGE UP (ref 0–2)
BASOPHILS NFR BLD AUTO: 0.4 % — SIGNIFICANT CHANGE UP (ref 0–2)
BASOPHILS NFR BLD AUTO: 0.5 %
BASOPHILS NFR BLD AUTO: 0.5 % — SIGNIFICANT CHANGE UP (ref 0–2)
BASOPHILS NFR BLD AUTO: 0.5 % — SIGNIFICANT CHANGE UP (ref 0–2)
BASOPHILS NFR BLD AUTO: 0.6 % — SIGNIFICANT CHANGE UP (ref 0–2)
BASOPHILS NFR BLD AUTO: 0.9 %
BASOPHILS NFR BLD AUTO: 0.9 % — SIGNIFICANT CHANGE UP (ref 0–2)
BASOPHILS NFR BLD AUTO: 0.9 % — SIGNIFICANT CHANGE UP (ref 0–2)
BASOPHILS NFR BLD AUTO: 2.6 % — HIGH (ref 0–2)
BILIRUB SERPL-MCNC: 0.5 MG/DL
BILIRUB SERPL-MCNC: 0.5 MG/DL
BILIRUB SERPL-MCNC: 0.5 MG/DL — SIGNIFICANT CHANGE UP (ref 0.2–1.2)
BILIRUB SERPL-MCNC: 0.6 MG/DL
BILIRUB SERPL-MCNC: 0.6 MG/DL
BILIRUB SERPL-MCNC: 0.6 MG/DL — SIGNIFICANT CHANGE UP (ref 0.2–1.2)
BILIRUB SERPL-MCNC: 0.6 MG/DL — SIGNIFICANT CHANGE UP (ref 0.2–1.2)
BILIRUB SERPL-MCNC: 0.7 MG/DL
BILIRUB SERPL-MCNC: 0.7 MG/DL
BILIRUB SERPL-MCNC: 0.7 MG/DL — SIGNIFICANT CHANGE UP (ref 0.2–1.2)
BILIRUB SERPL-MCNC: 0.8 MG/DL — SIGNIFICANT CHANGE UP (ref 0.2–1.2)
BILIRUB SERPL-MCNC: 0.9 MG/DL — SIGNIFICANT CHANGE UP (ref 0.2–1.2)
BILIRUB SERPL-MCNC: 1.1 MG/DL — SIGNIFICANT CHANGE UP (ref 0.2–1.2)
BILIRUB SERPL-MCNC: 1.2 MG/DL — SIGNIFICANT CHANGE UP (ref 0.2–1.2)
BILIRUB SERPL-MCNC: 1.4 MG/DL — HIGH (ref 0.2–1.2)
BILIRUB SERPL-MCNC: 1.5 MG/DL — HIGH (ref 0.2–1.2)
BILIRUB SERPL-MCNC: 1.5 MG/DL — HIGH (ref 0.2–1.2)
BILIRUB SERPL-MCNC: 1.6 MG/DL — HIGH (ref 0.2–1.2)
BILIRUB UR QL STRIP: NEGATIVE
BILIRUB UR-MCNC: NEGATIVE — SIGNIFICANT CHANGE UP
BILIRUBIN URINE: NEGATIVE
BLD GP AB SCN SERPL QL: NEGATIVE — SIGNIFICANT CHANGE UP
BLOOD GAS VENOUS - CREATININE: SIGNIFICANT CHANGE UP MG/DL (ref 0.5–1.3)
BLOOD GAS VENOUS COMPREHENSIVE RESULT: SIGNIFICANT CHANGE UP
BLOOD URINE: NEGATIVE
BORDETELLA PARAPERTUSSIS (RAPRVP): SIGNIFICANT CHANGE UP
BUN SERPL-MCNC: 10 MG/DL
BUN SERPL-MCNC: 10 MG/DL — SIGNIFICANT CHANGE UP (ref 7–23)
BUN SERPL-MCNC: 11 MG/DL — SIGNIFICANT CHANGE UP (ref 7–23)
BUN SERPL-MCNC: 12 MG/DL — SIGNIFICANT CHANGE UP (ref 7–23)
BUN SERPL-MCNC: 13 MG/DL — SIGNIFICANT CHANGE UP (ref 7–23)
BUN SERPL-MCNC: 14 MG/DL — SIGNIFICANT CHANGE UP (ref 7–23)
BUN SERPL-MCNC: 15 MG/DL
BUN SERPL-MCNC: 15 MG/DL — SIGNIFICANT CHANGE UP (ref 7–23)
BUN SERPL-MCNC: 16 MG/DL
BUN SERPL-MCNC: 16 MG/DL
BUN SERPL-MCNC: 16 MG/DL — SIGNIFICANT CHANGE UP (ref 7–23)
BUN SERPL-MCNC: 17 MG/DL — SIGNIFICANT CHANGE UP (ref 7–23)
BUN SERPL-MCNC: 18 MG/DL
BUN SERPL-MCNC: 18 MG/DL — SIGNIFICANT CHANGE UP (ref 7–23)
BUN SERPL-MCNC: 19 MG/DL — SIGNIFICANT CHANGE UP (ref 7–23)
BUN SERPL-MCNC: 20 MG/DL
BUN SERPL-MCNC: 20 MG/DL — SIGNIFICANT CHANGE UP (ref 7–23)
BUN SERPL-MCNC: 24 MG/DL — HIGH (ref 7–23)
BUN SERPL-MCNC: 27 MG/DL — HIGH (ref 7–23)
BUN SERPL-MCNC: 28 MG/DL — HIGH (ref 7–23)
BUN SERPL-MCNC: 28 MG/DL — HIGH (ref 7–23)
BUN SERPL-MCNC: 29 MG/DL — HIGH (ref 7–23)
BUN SERPL-MCNC: 8 MG/DL — SIGNIFICANT CHANGE UP (ref 7–23)
BUN SERPL-MCNC: 9 MG/DL — SIGNIFICANT CHANGE UP (ref 7–23)
BURR CELLS BLD QL SMEAR: PRESENT — SIGNIFICANT CHANGE UP
C DIFF BY PCR RESULT: SIGNIFICANT CHANGE UP
C PNEUM DNA SPEC QL NAA+PROBE: SIGNIFICANT CHANGE UP
CA-I SERPL-SCNC: 1.18 MMOL/L — SIGNIFICANT CHANGE UP (ref 1.15–1.33)
CA-I SERPL-SCNC: 1.2 MMOL/L — SIGNIFICANT CHANGE UP (ref 1.15–1.33)
CA-I SERPL-SCNC: 1.26 MMOL/L — SIGNIFICANT CHANGE UP (ref 1.15–1.33)
CALCIUM SERPL-MCNC: 7.3 MG/DL — LOW (ref 8.4–10.5)
CALCIUM SERPL-MCNC: 7.8 MG/DL — LOW (ref 8.4–10.5)
CALCIUM SERPL-MCNC: 7.8 MG/DL — LOW (ref 8.4–10.5)
CALCIUM SERPL-MCNC: 7.9 MG/DL — LOW (ref 8.4–10.5)
CALCIUM SERPL-MCNC: 8 MG/DL — LOW (ref 8.4–10.5)
CALCIUM SERPL-MCNC: 8.1 MG/DL — LOW (ref 8.4–10.5)
CALCIUM SERPL-MCNC: 8.2 MG/DL — LOW (ref 8.4–10.5)
CALCIUM SERPL-MCNC: 8.2 MG/DL — LOW (ref 8.4–10.5)
CALCIUM SERPL-MCNC: 8.3 MG/DL — LOW (ref 8.4–10.5)
CALCIUM SERPL-MCNC: 8.4 MG/DL — SIGNIFICANT CHANGE UP (ref 8.4–10.5)
CALCIUM SERPL-MCNC: 8.5 MG/DL
CALCIUM SERPL-MCNC: 8.5 MG/DL — SIGNIFICANT CHANGE UP (ref 8.4–10.5)
CALCIUM SERPL-MCNC: 8.5 MG/DL — SIGNIFICANT CHANGE UP (ref 8.5–10.1)
CALCIUM SERPL-MCNC: 8.6 MG/DL — SIGNIFICANT CHANGE UP (ref 8.4–10.5)
CALCIUM SERPL-MCNC: 8.6 MG/DL — SIGNIFICANT CHANGE UP (ref 8.5–10.1)
CALCIUM SERPL-MCNC: 8.7 MG/DL
CALCIUM SERPL-MCNC: 8.7 MG/DL — SIGNIFICANT CHANGE UP (ref 8.4–10.5)
CALCIUM SERPL-MCNC: 8.7 MG/DL — SIGNIFICANT CHANGE UP (ref 8.5–10.1)
CALCIUM SERPL-MCNC: 8.8 MG/DL — SIGNIFICANT CHANGE UP (ref 8.4–10.5)
CALCIUM SERPL-MCNC: 8.8 MG/DL — SIGNIFICANT CHANGE UP (ref 8.4–10.5)
CALCIUM SERPL-MCNC: 8.9 MG/DL
CALCIUM SERPL-MCNC: 8.9 MG/DL — SIGNIFICANT CHANGE UP (ref 8.5–10.1)
CALCIUM SERPL-MCNC: 9 MG/DL — SIGNIFICANT CHANGE UP (ref 8.4–10.5)
CALCIUM SERPL-MCNC: 9.1 MG/DL — SIGNIFICANT CHANGE UP (ref 8.5–10.1)
CALCIUM SERPL-MCNC: 9.2 MG/DL
CANCER AG19-9 SERPL-ACNC: 128 U/ML
CANCER AG19-9 SERPL-ACNC: 365 U/ML — HIGH
CANCER AG19-9 SERPL-ACNC: 51 U/ML
CEA SERPL-MCNC: 3.4 NG/ML
CEA SERPL-MCNC: 4.3 NG/ML
CHLORIDE BLDV-SCNC: 101 MMOL/L — SIGNIFICANT CHANGE UP (ref 96–108)
CHLORIDE BLDV-SCNC: 103 MMOL/L — SIGNIFICANT CHANGE UP (ref 96–108)
CHLORIDE BLDV-SCNC: 105 MMOL/L — SIGNIFICANT CHANGE UP (ref 96–108)
CHLORIDE BLDV-SCNC: 105 MMOL/L — SIGNIFICANT CHANGE UP (ref 96–108)
CHLORIDE BLDV-SCNC: 106 MMOL/L — SIGNIFICANT CHANGE UP (ref 96–108)
CHLORIDE BLDV-SCNC: 106 MMOL/L — SIGNIFICANT CHANGE UP (ref 96–108)
CHLORIDE SERPL-SCNC: 100 MMOL/L
CHLORIDE SERPL-SCNC: 100 MMOL/L — SIGNIFICANT CHANGE UP (ref 96–108)
CHLORIDE SERPL-SCNC: 100 MMOL/L — SIGNIFICANT CHANGE UP (ref 98–107)
CHLORIDE SERPL-SCNC: 100 MMOL/L — SIGNIFICANT CHANGE UP (ref 98–107)
CHLORIDE SERPL-SCNC: 101 MMOL/L — SIGNIFICANT CHANGE UP (ref 96–108)
CHLORIDE SERPL-SCNC: 101 MMOL/L — SIGNIFICANT CHANGE UP (ref 98–107)
CHLORIDE SERPL-SCNC: 102 MMOL/L
CHLORIDE SERPL-SCNC: 102 MMOL/L — SIGNIFICANT CHANGE UP (ref 96–108)
CHLORIDE SERPL-SCNC: 102 MMOL/L — SIGNIFICANT CHANGE UP (ref 96–108)
CHLORIDE SERPL-SCNC: 102 MMOL/L — SIGNIFICANT CHANGE UP (ref 98–107)
CHLORIDE SERPL-SCNC: 103 MMOL/L — SIGNIFICANT CHANGE UP (ref 96–108)
CHLORIDE SERPL-SCNC: 103 MMOL/L — SIGNIFICANT CHANGE UP (ref 98–107)
CHLORIDE SERPL-SCNC: 103 MMOL/L — SIGNIFICANT CHANGE UP (ref 98–107)
CHLORIDE SERPL-SCNC: 104 MMOL/L — SIGNIFICANT CHANGE UP (ref 96–108)
CHLORIDE SERPL-SCNC: 104 MMOL/L — SIGNIFICANT CHANGE UP (ref 98–107)
CHLORIDE SERPL-SCNC: 105 MMOL/L — SIGNIFICANT CHANGE UP (ref 96–108)
CHLORIDE SERPL-SCNC: 105 MMOL/L — SIGNIFICANT CHANGE UP (ref 98–107)
CHLORIDE SERPL-SCNC: 106 MMOL/L — SIGNIFICANT CHANGE UP (ref 96–108)
CHLORIDE SERPL-SCNC: 106 MMOL/L — SIGNIFICANT CHANGE UP (ref 98–107)
CHLORIDE SERPL-SCNC: 107 MMOL/L — SIGNIFICANT CHANGE UP (ref 96–108)
CHLORIDE SERPL-SCNC: 107 MMOL/L — SIGNIFICANT CHANGE UP (ref 96–108)
CHLORIDE SERPL-SCNC: 107 MMOL/L — SIGNIFICANT CHANGE UP (ref 98–107)
CHLORIDE SERPL-SCNC: 108 MMOL/L — SIGNIFICANT CHANGE UP (ref 96–108)
CHLORIDE SERPL-SCNC: 97 MMOL/L
CHLORIDE SERPL-SCNC: 98 MMOL/L
CHLORIDE SERPL-SCNC: 99 MMOL/L
CHLORIDE SERPL-SCNC: 99 MMOL/L
CHLORIDE SERPL-SCNC: 99 MMOL/L — SIGNIFICANT CHANGE UP (ref 98–107)
CHLORIDE UR-SCNC: 26 MMOL/L — SIGNIFICANT CHANGE UP
CHOLEST SERPL-MCNC: 106 MG/DL
CHOLEST SERPL-MCNC: 109 MG/DL
CHOLEST SERPL-MCNC: 128 MG/DL
CHOLEST SERPL-MCNC: 60 MG/DL — SIGNIFICANT CHANGE UP
CHOLEST SERPL-MCNC: 89 MG/DL
CLARITY UR: NORMAL
CO2 BLDA-SCNC: 22 MMOL/L — SIGNIFICANT CHANGE UP (ref 19–24)
CO2 BLDV-SCNC: 15 MMOL/L — LOW (ref 22–26)
CO2 BLDV-SCNC: 21 MMOL/L — LOW (ref 22–26)
CO2 BLDV-SCNC: 22 MMOL/L — SIGNIFICANT CHANGE UP (ref 22–26)
CO2 BLDV-SCNC: 23 MMOL/L — SIGNIFICANT CHANGE UP (ref 22–26)
CO2 BLDV-SCNC: 26.3 MMOL/L — HIGH (ref 22–26)
CO2 BLDV-SCNC: 27 MMOL/L — HIGH (ref 22–26)
CO2 SERPL-SCNC: 15 MMOL/L — LOW (ref 22–31)
CO2 SERPL-SCNC: 18 MMOL/L — LOW (ref 22–31)
CO2 SERPL-SCNC: 19 MMOL/L — LOW (ref 22–31)
CO2 SERPL-SCNC: 20 MMOL/L — LOW (ref 22–31)
CO2 SERPL-SCNC: 21 MMOL/L
CO2 SERPL-SCNC: 21 MMOL/L — LOW (ref 22–31)
CO2 SERPL-SCNC: 22 MMOL/L
CO2 SERPL-SCNC: 22 MMOL/L — SIGNIFICANT CHANGE UP (ref 22–31)
CO2 SERPL-SCNC: 23 MMOL/L
CO2 SERPL-SCNC: 23 MMOL/L — SIGNIFICANT CHANGE UP (ref 22–31)
CO2 SERPL-SCNC: 24 MMOL/L
CO2 SERPL-SCNC: 24 MMOL/L
CO2 SERPL-SCNC: 24 MMOL/L — SIGNIFICANT CHANGE UP (ref 22–31)
CO2 SERPL-SCNC: 24 MMOL/L — SIGNIFICANT CHANGE UP (ref 22–31)
CO2 SERPL-SCNC: 25 MMOL/L
CO2 SERPL-SCNC: 25 MMOL/L — SIGNIFICANT CHANGE UP (ref 22–31)
CO2 SERPL-SCNC: 25 MMOL/L — SIGNIFICANT CHANGE UP (ref 22–31)
CO2 SERPL-SCNC: 26 MMOL/L — SIGNIFICANT CHANGE UP (ref 22–31)
CO2 SERPL-SCNC: 27 MMOL/L — SIGNIFICANT CHANGE UP (ref 22–31)
CO2 SERPL-SCNC: 28 MMOL/L — SIGNIFICANT CHANGE UP (ref 22–31)
CO2 SERPL-SCNC: 29 MMOL/L — SIGNIFICANT CHANGE UP (ref 22–31)
CO2 SERPL-SCNC: 29 MMOL/L — SIGNIFICANT CHANGE UP (ref 22–31)
CO2 SERPL-SCNC: <10 MMOL/L — CRITICAL LOW (ref 22–31)
COD CRY URNS QL: ABNORMAL
COD CRY URNS QL: ABNORMAL
COHGB MFR BLDA: 1.9 % — SIGNIFICANT CHANGE UP
COLLECTION METHOD: NORMAL
COLOR SPEC: SIGNIFICANT CHANGE UP
COLOR SPEC: YELLOW — SIGNIFICANT CHANGE UP
COLOR: YELLOW
COMMENT - URINE: SIGNIFICANT CHANGE UP
CREAT SERPL-MCNC: 0.5 MG/DL — SIGNIFICANT CHANGE UP (ref 0.5–1.3)
CREAT SERPL-MCNC: 0.51 MG/DL — SIGNIFICANT CHANGE UP (ref 0.5–1.3)
CREAT SERPL-MCNC: 0.52 MG/DL — SIGNIFICANT CHANGE UP (ref 0.5–1.3)
CREAT SERPL-MCNC: 0.53 MG/DL — SIGNIFICANT CHANGE UP (ref 0.5–1.3)
CREAT SERPL-MCNC: 0.53 MG/DL — SIGNIFICANT CHANGE UP (ref 0.5–1.3)
CREAT SERPL-MCNC: 0.57 MG/DL — SIGNIFICANT CHANGE UP (ref 0.5–1.3)
CREAT SERPL-MCNC: 0.57 MG/DL — SIGNIFICANT CHANGE UP (ref 0.5–1.3)
CREAT SERPL-MCNC: 0.59 MG/DL — SIGNIFICANT CHANGE UP (ref 0.5–1.3)
CREAT SERPL-MCNC: 0.61 MG/DL — SIGNIFICANT CHANGE UP (ref 0.5–1.3)
CREAT SERPL-MCNC: 0.62 MG/DL — SIGNIFICANT CHANGE UP (ref 0.5–1.3)
CREAT SERPL-MCNC: 0.63 MG/DL — SIGNIFICANT CHANGE UP (ref 0.5–1.3)
CREAT SERPL-MCNC: 0.64 MG/DL — SIGNIFICANT CHANGE UP (ref 0.5–1.3)
CREAT SERPL-MCNC: 0.65 MG/DL — SIGNIFICANT CHANGE UP (ref 0.5–1.3)
CREAT SERPL-MCNC: 0.66 MG/DL — SIGNIFICANT CHANGE UP (ref 0.5–1.3)
CREAT SERPL-MCNC: 0.67 MG/DL — SIGNIFICANT CHANGE UP (ref 0.5–1.3)
CREAT SERPL-MCNC: 0.68 MG/DL — SIGNIFICANT CHANGE UP (ref 0.5–1.3)
CREAT SERPL-MCNC: 0.69 MG/DL — SIGNIFICANT CHANGE UP (ref 0.5–1.3)
CREAT SERPL-MCNC: 0.7 MG/DL
CREAT SERPL-MCNC: 0.7 MG/DL — SIGNIFICANT CHANGE UP (ref 0.5–1.3)
CREAT SERPL-MCNC: 0.7 MG/DL — SIGNIFICANT CHANGE UP (ref 0.5–1.3)
CREAT SERPL-MCNC: 0.71 MG/DL — SIGNIFICANT CHANGE UP (ref 0.5–1.3)
CREAT SERPL-MCNC: 0.72 MG/DL
CREAT SERPL-MCNC: 0.72 MG/DL
CREAT SERPL-MCNC: 0.72 MG/DL — SIGNIFICANT CHANGE UP (ref 0.5–1.3)
CREAT SERPL-MCNC: 0.72 MG/DL — SIGNIFICANT CHANGE UP (ref 0.5–1.3)
CREAT SERPL-MCNC: 0.73 MG/DL — SIGNIFICANT CHANGE UP (ref 0.5–1.3)
CREAT SERPL-MCNC: 0.74 MG/DL
CREAT SERPL-MCNC: 0.75 MG/DL — SIGNIFICANT CHANGE UP (ref 0.5–1.3)
CREAT SERPL-MCNC: 0.75 MG/DL — SIGNIFICANT CHANGE UP (ref 0.5–1.3)
CREAT SERPL-MCNC: 0.77 MG/DL — SIGNIFICANT CHANGE UP (ref 0.5–1.3)
CREAT SERPL-MCNC: 0.8 MG/DL — SIGNIFICANT CHANGE UP (ref 0.5–1.3)
CREAT SERPL-MCNC: 0.81 MG/DL — SIGNIFICANT CHANGE UP (ref 0.5–1.3)
CREAT SERPL-MCNC: 0.81 MG/DL — SIGNIFICANT CHANGE UP (ref 0.5–1.3)
CREAT SERPL-MCNC: 0.82 MG/DL
CREAT SERPL-MCNC: 0.82 MG/DL
CREAT SERPL-MCNC: 0.84 MG/DL — SIGNIFICANT CHANGE UP (ref 0.5–1.3)
CREAT SERPL-MCNC: 0.87 MG/DL — SIGNIFICANT CHANGE UP (ref 0.5–1.3)
CREAT SERPL-MCNC: 0.87 MG/DL — SIGNIFICANT CHANGE UP (ref 0.5–1.3)
CREAT SERPL-MCNC: 0.88 MG/DL — SIGNIFICANT CHANGE UP (ref 0.5–1.3)
CREAT SERPL-MCNC: 0.89 MG/DL — SIGNIFICANT CHANGE UP (ref 0.5–1.3)
CREAT SERPL-MCNC: 0.91 MG/DL — SIGNIFICANT CHANGE UP (ref 0.5–1.3)
CREAT SERPL-MCNC: 0.92 MG/DL — SIGNIFICANT CHANGE UP (ref 0.5–1.3)
CREAT SERPL-MCNC: 0.96 MG/DL — SIGNIFICANT CHANGE UP (ref 0.5–1.3)
CREAT SERPL-MCNC: 1.12 MG/DL — SIGNIFICANT CHANGE UP (ref 0.5–1.3)
CRP SERPL-MCNC: 15 MG/L — HIGH
CRP SERPL-MCNC: 181 MG/L — HIGH (ref 0–4)
CULTURE RESULTS: NO GROWTH — SIGNIFICANT CHANGE UP
CULTURE RESULTS: NO GROWTH — SIGNIFICANT CHANGE UP
CULTURE RESULTS: SIGNIFICANT CHANGE UP
DACRYOCYTES BLD QL SMEAR: SLIGHT — SIGNIFICANT CHANGE UP
DIFF PNL FLD: ABNORMAL
DIFF PNL FLD: NEGATIVE — SIGNIFICANT CHANGE UP
EGFR: 100 ML/MIN/1.73M2 — SIGNIFICANT CHANGE UP
EGFR: 100 ML/MIN/1.73M2 — SIGNIFICANT CHANGE UP
EGFR: 102 ML/MIN/1.73M2 — SIGNIFICANT CHANGE UP
EGFR: 102 ML/MIN/1.73M2 — SIGNIFICANT CHANGE UP
EGFR: 103 ML/MIN/1.73M2 — SIGNIFICANT CHANGE UP
EGFR: 103 ML/MIN/1.73M2 — SIGNIFICANT CHANGE UP
EGFR: 104 ML/MIN/1.73M2 — SIGNIFICANT CHANGE UP
EGFR: 67 ML/MIN/1.73M2 — SIGNIFICANT CHANGE UP
EGFR: 80 ML/MIN/1.73M2 — SIGNIFICANT CHANGE UP
EGFR: 85 ML/MIN/1.73M2 — SIGNIFICANT CHANGE UP
EGFR: 86 ML/MIN/1.73M2 — SIGNIFICANT CHANGE UP
EGFR: 87 ML/MIN/1.73M2 — SIGNIFICANT CHANGE UP
EGFR: 87 ML/MIN/1.73M2 — SIGNIFICANT CHANGE UP
EGFR: 88 ML/MIN/1.73M2 — SIGNIFICANT CHANGE UP
EGFR: 88 ML/MIN/1.73M2 — SIGNIFICANT CHANGE UP
EGFR: 89 ML/MIN/1.73M2
EGFR: 89 ML/MIN/1.73M2
EGFR: 89 ML/MIN/1.73M2 — SIGNIFICANT CHANGE UP
EGFR: 90 ML/MIN/1.73M2 — SIGNIFICANT CHANGE UP
EGFR: 91 ML/MIN/1.73M2 — SIGNIFICANT CHANGE UP
EGFR: 92 ML/MIN/1.73M2
EGFR: 92 ML/MIN/1.73M2 — SIGNIFICANT CHANGE UP
EGFR: 92 ML/MIN/1.73M2 — SIGNIFICANT CHANGE UP
EGFR: 93 ML/MIN/1.73M2
EGFR: 93 ML/MIN/1.73M2 — SIGNIFICANT CHANGE UP
EGFR: 94 ML/MIN/1.73M2
EGFR: 94 ML/MIN/1.73M2 — SIGNIFICANT CHANGE UP
EGFR: 95 ML/MIN/1.73M2 — SIGNIFICANT CHANGE UP
EGFR: 96 ML/MIN/1.73M2 — SIGNIFICANT CHANGE UP
EGFR: 96 ML/MIN/1.73M2 — SIGNIFICANT CHANGE UP
EGFR: 97 ML/MIN/1.73M2 — SIGNIFICANT CHANGE UP
EGFR: 98 ML/MIN/1.73M2 — SIGNIFICANT CHANGE UP
EGFR: 99 ML/MIN/1.73M2 — SIGNIFICANT CHANGE UP
ELLIPTOCYTES BLD QL SMEAR: SIGNIFICANT CHANGE UP
ELLIPTOCYTES BLD QL SMEAR: SLIGHT — SIGNIFICANT CHANGE UP
EOSINOPHIL # BLD AUTO: 0 K/UL
EOSINOPHIL # BLD AUTO: 0 K/UL — SIGNIFICANT CHANGE UP (ref 0–0.5)
EOSINOPHIL # BLD AUTO: 0.02 K/UL
EOSINOPHIL # BLD AUTO: 0.02 K/UL — SIGNIFICANT CHANGE UP (ref 0–0.5)
EOSINOPHIL # BLD AUTO: 0.03 K/UL
EOSINOPHIL # BLD AUTO: 0.03 K/UL
EOSINOPHIL # BLD AUTO: 0.03 K/UL — SIGNIFICANT CHANGE UP (ref 0–0.5)
EOSINOPHIL # BLD AUTO: 0.03 K/UL — SIGNIFICANT CHANGE UP (ref 0–0.5)
EOSINOPHIL # BLD AUTO: 0.04 K/UL — SIGNIFICANT CHANGE UP (ref 0–0.5)
EOSINOPHIL # BLD AUTO: 0.06 K/UL — SIGNIFICANT CHANGE UP (ref 0–0.5)
EOSINOPHIL # BLD AUTO: 0.06 K/UL — SIGNIFICANT CHANGE UP (ref 0–0.5)
EOSINOPHIL # BLD AUTO: 0.08 K/UL
EOSINOPHIL NFR BLD AUTO: 0 %
EOSINOPHIL NFR BLD AUTO: 0 % — SIGNIFICANT CHANGE UP (ref 0–6)
EOSINOPHIL NFR BLD AUTO: 0.3 % — SIGNIFICANT CHANGE UP (ref 0–6)
EOSINOPHIL NFR BLD AUTO: 0.6 %
EOSINOPHIL NFR BLD AUTO: 0.6 % — SIGNIFICANT CHANGE UP (ref 0–6)
EOSINOPHIL NFR BLD AUTO: 0.7 %
EOSINOPHIL NFR BLD AUTO: 0.8 %
EOSINOPHIL NFR BLD AUTO: 0.8 % — SIGNIFICANT CHANGE UP (ref 0–6)
EOSINOPHIL NFR BLD AUTO: 0.9 % — SIGNIFICANT CHANGE UP (ref 0–6)
EOSINOPHIL NFR BLD AUTO: 0.9 % — SIGNIFICANT CHANGE UP (ref 0–6)
EOSINOPHIL NFR BLD AUTO: 1 % — SIGNIFICANT CHANGE UP (ref 0–6)
EOSINOPHIL NFR BLD AUTO: 1 % — SIGNIFICANT CHANGE UP (ref 0–6)
EOSINOPHIL NFR BLD AUTO: 1.1 % — SIGNIFICANT CHANGE UP (ref 0–6)
EOSINOPHIL NFR BLD AUTO: 1.8 % — SIGNIFICANT CHANGE UP (ref 0–6)
EOSINOPHIL NFR BLD AUTO: 2.1 %
EPI CELLS # UR: 0 /HPF — SIGNIFICANT CHANGE UP
EPI CELLS # UR: 2 /HPF — SIGNIFICANT CHANGE UP
EPI CELLS # UR: 5 /HPF — SIGNIFICANT CHANGE UP (ref 0–5)
EPI CELLS # UR: SIGNIFICANT CHANGE UP
ERYTHROCYTE [SEDIMENTATION RATE] IN BLOOD: 27 MM/HR — HIGH (ref 0–20)
ERYTHROCYTE [SEDIMENTATION RATE] IN BLOOD: 56 MM/HR — HIGH (ref 0–20)
ESTIMATED AVERAGE GLUCOSE: 105 MG/DL
ESTIMATED AVERAGE GLUCOSE: 120 MG/DL
ESTIMATED AVERAGE GLUCOSE: 120 MG/DL
ESTIMATED AVERAGE GLUCOSE: 120 MG/DL — HIGH (ref 68–114)
ESTIMATED AVERAGE GLUCOSE: 120 MG/DL — HIGH (ref 68–114)
ESTIMATED AVERAGE GLUCOSE: 120 — SIGNIFICANT CHANGE UP
ESTIMATED AVERAGE GLUCOSE: 123 MG/DL
ESTIMATED AVERAGE GLUCOSE: 134 MG/DL
ETEC DNA STL QL NAA+PROBE: DETECTED
FERRITIN SERPL-MCNC: 33 NG/ML — SIGNIFICANT CHANGE UP (ref 30–400)
FERRITIN SERPL-MCNC: 56 NG/ML
FERRITIN SERPL-MCNC: 59 NG/ML
FLUAV AG NPH QL: SIGNIFICANT CHANGE UP
FLUAV SUBTYP SPEC NAA+PROBE: SIGNIFICANT CHANGE UP
FLUBV AG NPH QL: SIGNIFICANT CHANGE UP
FLUBV RNA SPEC QL NAA+PROBE: SIGNIFICANT CHANGE UP
FOLATE SERPL-MCNC: 15 NG/ML
FOLATE SERPL-MCNC: >20 NG/ML
FOLATE SERPL-MCNC: >20 NG/ML
FRUCTOSAMINE SERPL-MCNC: 264 UMOL/L
GAS PNL BLDA: SIGNIFICANT CHANGE UP
GAS PNL BLDA: SIGNIFICANT CHANGE UP
GAS PNL BLDV: 132 MMOL/L — LOW (ref 136–145)
GAS PNL BLDV: 132 MMOL/L — LOW (ref 136–145)
GAS PNL BLDV: 133 MMOL/L — LOW (ref 136–145)
GAS PNL BLDV: 136 MMOL/L — SIGNIFICANT CHANGE UP (ref 136–145)
GAS PNL BLDV: SIGNIFICANT CHANGE UP
GI PCR PANEL: DETECTED
GIANT PLATELETS BLD QL SMEAR: PRESENT — SIGNIFICANT CHANGE UP
GLUCOSE BLDC GLUCOMTR-MCNC: 106 MG/DL — HIGH (ref 70–99)
GLUCOSE BLDC GLUCOMTR-MCNC: 106 MG/DL — HIGH (ref 70–99)
GLUCOSE BLDC GLUCOMTR-MCNC: 108 MG/DL — HIGH (ref 70–99)
GLUCOSE BLDC GLUCOMTR-MCNC: 113 MG/DL — HIGH (ref 70–99)
GLUCOSE BLDC GLUCOMTR-MCNC: 113 MG/DL — HIGH (ref 70–99)
GLUCOSE BLDC GLUCOMTR-MCNC: 115 MG/DL — HIGH (ref 70–99)
GLUCOSE BLDC GLUCOMTR-MCNC: 117 MG/DL — HIGH (ref 70–99)
GLUCOSE BLDC GLUCOMTR-MCNC: 118 MG/DL — HIGH (ref 70–99)
GLUCOSE BLDC GLUCOMTR-MCNC: 120 MG/DL — HIGH (ref 70–99)
GLUCOSE BLDC GLUCOMTR-MCNC: 121 MG/DL — HIGH (ref 70–99)
GLUCOSE BLDC GLUCOMTR-MCNC: 121 MG/DL — HIGH (ref 70–99)
GLUCOSE BLDC GLUCOMTR-MCNC: 126 MG/DL — HIGH (ref 70–99)
GLUCOSE BLDC GLUCOMTR-MCNC: 127 MG/DL — HIGH (ref 70–99)
GLUCOSE BLDC GLUCOMTR-MCNC: 140 MG/DL — HIGH (ref 70–99)
GLUCOSE BLDC GLUCOMTR-MCNC: 142 MG/DL — HIGH (ref 70–99)
GLUCOSE BLDC GLUCOMTR-MCNC: 143 MG/DL — HIGH (ref 70–99)
GLUCOSE BLDC GLUCOMTR-MCNC: 144 MG/DL — HIGH (ref 70–99)
GLUCOSE BLDC GLUCOMTR-MCNC: 167 MG/DL — HIGH (ref 70–99)
GLUCOSE BLDC GLUCOMTR-MCNC: 167 MG/DL — HIGH (ref 70–99)
GLUCOSE BLDC GLUCOMTR-MCNC: 189 MG/DL — HIGH (ref 70–99)
GLUCOSE BLDC GLUCOMTR-MCNC: 98 MG/DL — SIGNIFICANT CHANGE UP (ref 70–99)
GLUCOSE BLDV-MCNC: 142 MG/DL — HIGH (ref 70–99)
GLUCOSE BLDV-MCNC: 152 MG/DL — HIGH (ref 70–99)
GLUCOSE BLDV-MCNC: 154 MG/DL — HIGH (ref 70–99)
GLUCOSE BLDV-MCNC: 155 MG/DL — HIGH (ref 70–99)
GLUCOSE BLDV-MCNC: 177 MG/DL — HIGH (ref 70–99)
GLUCOSE BLDV-MCNC: 177 MG/DL — HIGH (ref 70–99)
GLUCOSE QUALITATIVE U: NEGATIVE
GLUCOSE SERPL-MCNC: 102 MG/DL — HIGH (ref 70–99)
GLUCOSE SERPL-MCNC: 103 MG/DL — HIGH (ref 70–99)
GLUCOSE SERPL-MCNC: 106 MG/DL — HIGH (ref 70–99)
GLUCOSE SERPL-MCNC: 106 MG/DL — HIGH (ref 70–99)
GLUCOSE SERPL-MCNC: 110 MG/DL
GLUCOSE SERPL-MCNC: 110 MG/DL — HIGH (ref 70–99)
GLUCOSE SERPL-MCNC: 110 MG/DL — HIGH (ref 70–99)
GLUCOSE SERPL-MCNC: 111 MG/DL — HIGH (ref 70–99)
GLUCOSE SERPL-MCNC: 111 MG/DL — HIGH (ref 70–99)
GLUCOSE SERPL-MCNC: 112 MG/DL — HIGH (ref 70–99)
GLUCOSE SERPL-MCNC: 113 MG/DL — HIGH (ref 70–99)
GLUCOSE SERPL-MCNC: 115 MG/DL
GLUCOSE SERPL-MCNC: 117 MG/DL — HIGH (ref 70–99)
GLUCOSE SERPL-MCNC: 118 MG/DL — HIGH (ref 70–99)
GLUCOSE SERPL-MCNC: 118 MG/DL — HIGH (ref 70–99)
GLUCOSE SERPL-MCNC: 120 MG/DL
GLUCOSE SERPL-MCNC: 121 MG/DL — HIGH (ref 70–99)
GLUCOSE SERPL-MCNC: 122 MG/DL — HIGH (ref 70–99)
GLUCOSE SERPL-MCNC: 126 MG/DL — HIGH (ref 70–99)
GLUCOSE SERPL-MCNC: 126 MG/DL — HIGH (ref 70–99)
GLUCOSE SERPL-MCNC: 127 MG/DL — HIGH (ref 70–99)
GLUCOSE SERPL-MCNC: 127 MG/DL — HIGH (ref 70–99)
GLUCOSE SERPL-MCNC: 129 MG/DL — HIGH (ref 70–99)
GLUCOSE SERPL-MCNC: 129 MG/DL — HIGH (ref 70–99)
GLUCOSE SERPL-MCNC: 132 MG/DL — HIGH (ref 70–99)
GLUCOSE SERPL-MCNC: 133 MG/DL — HIGH (ref 70–99)
GLUCOSE SERPL-MCNC: 134 MG/DL — HIGH (ref 70–99)
GLUCOSE SERPL-MCNC: 137 MG/DL — HIGH (ref 70–99)
GLUCOSE SERPL-MCNC: 139 MG/DL — HIGH (ref 70–99)
GLUCOSE SERPL-MCNC: 139 MG/DL — HIGH (ref 70–99)
GLUCOSE SERPL-MCNC: 142 MG/DL — HIGH (ref 70–99)
GLUCOSE SERPL-MCNC: 145 MG/DL
GLUCOSE SERPL-MCNC: 145 MG/DL — HIGH (ref 70–99)
GLUCOSE SERPL-MCNC: 146 MG/DL — HIGH (ref 70–99)
GLUCOSE SERPL-MCNC: 146 MG/DL — HIGH (ref 70–99)
GLUCOSE SERPL-MCNC: 147 MG/DL — HIGH (ref 70–99)
GLUCOSE SERPL-MCNC: 154 MG/DL — HIGH (ref 70–99)
GLUCOSE SERPL-MCNC: 155 MG/DL — HIGH (ref 70–99)
GLUCOSE SERPL-MCNC: 161 MG/DL — HIGH (ref 70–99)
GLUCOSE SERPL-MCNC: 177 MG/DL — HIGH (ref 70–99)
GLUCOSE SERPL-MCNC: 194 MG/DL — HIGH (ref 70–99)
GLUCOSE SERPL-MCNC: 198 MG/DL
GLUCOSE SERPL-MCNC: 203 MG/DL — HIGH (ref 70–99)
GLUCOSE SERPL-MCNC: 84 MG/DL
GLUCOSE SERPL-MCNC: 93 MG/DL — SIGNIFICANT CHANGE UP (ref 70–99)
GLUCOSE SERPL-MCNC: 98 MG/DL — SIGNIFICANT CHANGE UP (ref 70–99)
GLUCOSE SERPL-MCNC: 98 MG/DL — SIGNIFICANT CHANGE UP (ref 70–99)
GLUCOSE UR QL: NEGATIVE — SIGNIFICANT CHANGE UP
GLUCOSE UR-MCNC: NEGATIVE
GRAM STN FLD: SIGNIFICANT CHANGE UP
HADV DNA SPEC QL NAA+PROBE: SIGNIFICANT CHANGE UP
HAPTOGLOB SERPL-MCNC: 71 MG/DL — SIGNIFICANT CHANGE UP (ref 34–200)
HAPTOGLOB SERPL-MCNC: 83 MG/DL — SIGNIFICANT CHANGE UP (ref 34–200)
HAPTOGLOB SERPL-MCNC: 84 MG/DL — SIGNIFICANT CHANGE UP (ref 34–200)
HAPTOGLOB SERPL-MCNC: 86 MG/DL — SIGNIFICANT CHANGE UP (ref 34–200)
HBA1C MFR BLD HPLC: 5.3 %
HBA1C MFR BLD HPLC: 5.8 %
HBA1C MFR BLD HPLC: 5.8 %
HBA1C MFR BLD HPLC: 5.9 %
HBA1C MFR BLD HPLC: 6.3 %
HCG UR QL: 1 EU/DL
HCO3 BLDA-SCNC: 19 — LOW (ref 21–28)
HCO3 BLDA-SCNC: 21 MMOL/L — SIGNIFICANT CHANGE UP (ref 21–28)
HCO3 BLDV-SCNC: 14 MMOL/L — LOW (ref 22–29)
HCO3 BLDV-SCNC: 20 MMOL/L — LOW (ref 22–29)
HCO3 BLDV-SCNC: 21 MMOL/L — LOW (ref 22–29)
HCO3 BLDV-SCNC: 22 MMOL/L — SIGNIFICANT CHANGE UP (ref 22–29)
HCO3 BLDV-SCNC: 25 MMOL/L — SIGNIFICANT CHANGE UP (ref 22–29)
HCO3 BLDV-SCNC: 25 MMOL/L — SIGNIFICANT CHANGE UP (ref 22–29)
HCOV 229E RNA SPEC QL NAA+PROBE: SIGNIFICANT CHANGE UP
HCOV HKU1 RNA SPEC QL NAA+PROBE: SIGNIFICANT CHANGE UP
HCOV NL63 RNA SPEC QL NAA+PROBE: SIGNIFICANT CHANGE UP
HCOV OC43 RNA SPEC QL NAA+PROBE: SIGNIFICANT CHANGE UP
HCT VFR BLD CALC: 20.9 % — CRITICAL LOW (ref 39–50)
HCT VFR BLD CALC: 22.4 % — LOW (ref 39–50)
HCT VFR BLD CALC: 22.4 % — LOW (ref 39–50)
HCT VFR BLD CALC: 22.7 % — LOW (ref 39–50)
HCT VFR BLD CALC: 22.9 % — LOW (ref 39–50)
HCT VFR BLD CALC: 23 % — LOW (ref 39–50)
HCT VFR BLD CALC: 23.2 % — LOW (ref 39–50)
HCT VFR BLD CALC: 23.2 % — LOW (ref 39–50)
HCT VFR BLD CALC: 23.7 % — LOW (ref 39–50)
HCT VFR BLD CALC: 23.8 % — LOW (ref 39–50)
HCT VFR BLD CALC: 23.8 % — LOW (ref 39–50)
HCT VFR BLD CALC: 23.9 % — LOW (ref 39–50)
HCT VFR BLD CALC: 24.1 % — LOW (ref 39–50)
HCT VFR BLD CALC: 24.1 % — LOW (ref 39–50)
HCT VFR BLD CALC: 24.3 % — LOW (ref 39–50)
HCT VFR BLD CALC: 24.5 % — LOW (ref 39–50)
HCT VFR BLD CALC: 24.5 % — LOW (ref 39–50)
HCT VFR BLD CALC: 24.7 % — LOW (ref 39–50)
HCT VFR BLD CALC: 24.9 % — LOW (ref 39–50)
HCT VFR BLD CALC: 25.5 % — LOW (ref 39–50)
HCT VFR BLD CALC: 25.5 % — LOW (ref 39–50)
HCT VFR BLD CALC: 25.6 % — LOW (ref 39–50)
HCT VFR BLD CALC: 25.9 % — LOW (ref 39–50)
HCT VFR BLD CALC: 26.2 % — LOW (ref 39–50)
HCT VFR BLD CALC: 26.2 % — LOW (ref 39–50)
HCT VFR BLD CALC: 26.4 % — LOW (ref 39–50)
HCT VFR BLD CALC: 26.6 % — LOW (ref 39–50)
HCT VFR BLD CALC: 26.7 % — LOW (ref 39–50)
HCT VFR BLD CALC: 27 % — LOW (ref 39–50)
HCT VFR BLD CALC: 27 % — LOW (ref 39–50)
HCT VFR BLD CALC: 27.1 % — LOW (ref 39–50)
HCT VFR BLD CALC: 27.2 % — LOW (ref 39–50)
HCT VFR BLD CALC: 27.3 % — LOW (ref 39–50)
HCT VFR BLD CALC: 27.4 % — LOW (ref 39–50)
HCT VFR BLD CALC: 27.4 % — LOW (ref 39–50)
HCT VFR BLD CALC: 27.7 % — LOW (ref 39–50)
HCT VFR BLD CALC: 27.8 % — LOW (ref 39–50)
HCT VFR BLD CALC: 27.9 % — LOW (ref 39–50)
HCT VFR BLD CALC: 27.9 % — LOW (ref 39–50)
HCT VFR BLD CALC: 28.3 % — LOW (ref 39–50)
HCT VFR BLD CALC: 28.5 % — LOW (ref 39–50)
HCT VFR BLD CALC: 28.9 % — LOW (ref 39–50)
HCT VFR BLD CALC: 29.2 % — LOW (ref 39–50)
HCT VFR BLD CALC: 29.2 % — LOW (ref 39–50)
HCT VFR BLD CALC: 29.6 % — LOW (ref 39–50)
HCT VFR BLD CALC: 29.6 % — LOW (ref 39–50)
HCT VFR BLD CALC: 29.7 % — LOW (ref 39–50)
HCT VFR BLD CALC: 30 %
HCT VFR BLD CALC: 30.6 % — LOW (ref 39–50)
HCT VFR BLD CALC: 30.8 %
HCT VFR BLD CALC: 31.6 % — LOW (ref 39–50)
HCT VFR BLD CALC: 32.9 %
HCT VFR BLD CALC: 33.2 %
HCT VFR BLD CALC: 33.4 %
HCT VFR BLDA CALC: 22 % — LOW (ref 39–51)
HCT VFR BLDA CALC: 23 % — LOW (ref 39–51)
HCT VFR BLDA CALC: 25 % — LOW (ref 39–51)
HCT VFR BLDA CALC: 26 % — LOW (ref 39–51)
HDLC SERPL-MCNC: 40 MG/DL
HDLC SERPL-MCNC: 40 MG/DL
HDLC SERPL-MCNC: 46 MG/DL
HDLC SERPL-MCNC: 53 MG/DL
HDLC SERPL-MCNC: 8 MG/DL — LOW
HGB BLD CALC-MCNC: 7.4 G/DL — LOW (ref 12.6–17.4)
HGB BLD CALC-MCNC: 7.6 G/DL — LOW (ref 12.6–17.4)
HGB BLD CALC-MCNC: 7.6 G/DL — LOW (ref 12.6–17.4)
HGB BLD CALC-MCNC: 7.7 G/DL — LOW (ref 12.6–17.4)
HGB BLD CALC-MCNC: 8.4 G/DL — LOW (ref 13–17)
HGB BLD CALC-MCNC: 8.7 G/DL — LOW (ref 12.6–17.4)
HGB BLD-MCNC: 6.7 G/DL — CRITICAL LOW (ref 13–17)
HGB BLD-MCNC: 7 G/DL — CRITICAL LOW (ref 13–17)
HGB BLD-MCNC: 7.1 G/DL — LOW (ref 13–17)
HGB BLD-MCNC: 7.2 G/DL — LOW (ref 13–17)
HGB BLD-MCNC: 7.3 G/DL — LOW (ref 13–17)
HGB BLD-MCNC: 7.4 G/DL — LOW (ref 13–17)
HGB BLD-MCNC: 7.4 G/DL — LOW (ref 13–17)
HGB BLD-MCNC: 7.5 G/DL — LOW (ref 13–17)
HGB BLD-MCNC: 7.5 G/DL — LOW (ref 13–17)
HGB BLD-MCNC: 7.6 G/DL — LOW (ref 13–17)
HGB BLD-MCNC: 7.7 G/DL — LOW (ref 13–17)
HGB BLD-MCNC: 7.8 G/DL — LOW (ref 13–17)
HGB BLD-MCNC: 7.9 G/DL — LOW (ref 13–17)
HGB BLD-MCNC: 8 G/DL — LOW (ref 13–17)
HGB BLD-MCNC: 8.1 G/DL — LOW (ref 13–17)
HGB BLD-MCNC: 8.2 G/DL — LOW (ref 13–17)
HGB BLD-MCNC: 8.2 G/DL — LOW (ref 13–17)
HGB BLD-MCNC: 8.3 G/DL — LOW (ref 13–17)
HGB BLD-MCNC: 8.4 G/DL — LOW (ref 13–17)
HGB BLD-MCNC: 8.5 G/DL — LOW (ref 13–17)
HGB BLD-MCNC: 8.6 G/DL — LOW (ref 13–17)
HGB BLD-MCNC: 8.7 G/DL — LOW (ref 13–17)
HGB BLD-MCNC: 8.8 G/DL — LOW (ref 13–17)
HGB BLD-MCNC: 8.9 G/DL — LOW (ref 13–17)
HGB BLD-MCNC: 9 G/DL
HGB BLD-MCNC: 9 G/DL — LOW (ref 13–17)
HGB BLD-MCNC: 9.1 G/DL — LOW (ref 13–17)
HGB BLD-MCNC: 9.1 G/DL — LOW (ref 13–17)
HGB BLD-MCNC: 9.2 G/DL
HGB BLD-MCNC: 9.2 G/DL — LOW (ref 13–17)
HGB BLD-MCNC: 9.3 G/DL — LOW (ref 13–17)
HGB BLD-MCNC: 9.4 G/DL — LOW (ref 13–17)
HGB BLD-MCNC: 9.5 G/DL
HGB BLD-MCNC: 9.5 G/DL
HGB BLD-MCNC: 9.5 G/DL — LOW (ref 13–17)
HGB BLD-MCNC: 9.6 G/DL
HGB BLD-MCNC: 9.7 G/DL — LOW (ref 13–17)
HGB BLD-MCNC: 9.9 G/DL — LOW (ref 13–17)
HGB BLDA-MCNC: 8.8 G/DL — LOW (ref 12.6–17.4)
HGB UR QL STRIP.AUTO: NEGATIVE
HMPV RNA SPEC QL NAA+PROBE: SIGNIFICANT CHANGE UP
HOROWITZ INDEX BLDA+IHG-RTO: 100 — SIGNIFICANT CHANGE UP
HOROWITZ INDEX BLDV+IHG-RTO: 21 — SIGNIFICANT CHANGE UP
HOROWITZ INDEX BLDV+IHG-RTO: 21 — SIGNIFICANT CHANGE UP
HPIV1 RNA SPEC QL NAA+PROBE: SIGNIFICANT CHANGE UP
HPIV2 RNA SPEC QL NAA+PROBE: SIGNIFICANT CHANGE UP
HPIV3 RNA SPEC QL NAA+PROBE: SIGNIFICANT CHANGE UP
HPIV4 RNA SPEC QL NAA+PROBE: SIGNIFICANT CHANGE UP
HYALINE CASTS # UR AUTO: 0 /LPF — SIGNIFICANT CHANGE UP (ref 0–7)
HYALINE CASTS # UR AUTO: 5 /LPF — HIGH (ref 0–2)
HYPOCHROMIA BLD QL: SIGNIFICANT CHANGE UP
HYPOCHROMIA BLD QL: SLIGHT — SIGNIFICANT CHANGE UP
IANC: 1.87 K/UL — SIGNIFICANT CHANGE UP (ref 1.8–7.4)
IANC: 1.99 K/UL — SIGNIFICANT CHANGE UP (ref 1.8–7.4)
IANC: 2.08 K/UL — SIGNIFICANT CHANGE UP (ref 1.8–7.4)
IANC: 2.09 K/UL — SIGNIFICANT CHANGE UP (ref 1.8–7.4)
IANC: 2.27 K/UL — SIGNIFICANT CHANGE UP (ref 1.8–7.4)
IANC: 2.4 K/UL — SIGNIFICANT CHANGE UP (ref 1.8–7.4)
IANC: 4.32 K/UL — SIGNIFICANT CHANGE UP (ref 1.8–7.4)
IMM GRANULOCYTES NFR BLD AUTO: 0.2 % — SIGNIFICANT CHANGE UP (ref 0–0.9)
IMM GRANULOCYTES NFR BLD AUTO: 0.2 % — SIGNIFICANT CHANGE UP (ref 0–1.5)
IMM GRANULOCYTES NFR BLD AUTO: 0.3 %
IMM GRANULOCYTES NFR BLD AUTO: 0.3 %
IMM GRANULOCYTES NFR BLD AUTO: 0.3 % — SIGNIFICANT CHANGE UP (ref 0–0.9)
IMM GRANULOCYTES NFR BLD AUTO: 0.4 %
IMM GRANULOCYTES NFR BLD AUTO: 0.5 %
IMM GRANULOCYTES NFR BLD AUTO: 0.6 % — SIGNIFICANT CHANGE UP (ref 0–0.9)
IMM GRANULOCYTES NFR BLD AUTO: 0.7 % — SIGNIFICANT CHANGE UP (ref 0–0.9)
IMM GRANULOCYTES NFR BLD AUTO: 1.1 % — SIGNIFICANT CHANGE UP (ref 0–1.5)
INR BLD: 1.31 RATIO — HIGH (ref 0.88–1.16)
INR BLD: 1.39 RATIO — HIGH (ref 0.88–1.16)
INR BLD: 1.41 RATIO — HIGH (ref 0.88–1.16)
INR BLD: 1.42 RATIO — HIGH (ref 0.88–1.16)
INR BLD: 1.43 RATIO — HIGH (ref 0.88–1.16)
INR BLD: 1.54 RATIO — HIGH (ref 0.88–1.16)
INR BLD: 1.58 RATIO — HIGH (ref 0.88–1.16)
INR BLD: 1.66 RATIO — HIGH (ref 0.88–1.16)
INR BLD: 1.84 RATIO — HIGH (ref 0.88–1.16)
INR BLD: 1.88 RATIO — HIGH (ref 0.88–1.16)
INR BLD: 1.99 RATIO — HIGH (ref 0.88–1.16)
INR BLD: 2.07 RATIO — HIGH (ref 0.88–1.16)
INR BLD: 2.1 RATIO — HIGH (ref 0.88–1.16)
INR BLD: 2.12 RATIO — HIGH (ref 0.88–1.16)
INR BLD: 2.17 RATIO — HIGH (ref 0.88–1.16)
INR BLD: 2.22 RATIO — HIGH (ref 0.88–1.16)
INR BLD: 2.79 RATIO — HIGH (ref 0.88–1.16)
INR BLD: 3.04 RATIO — HIGH (ref 0.88–1.16)
INR BLD: 3.43 RATIO — HIGH (ref 0.88–1.16)
INR BLD: 3.55 RATIO — HIGH (ref 0.88–1.16)
INR BLD: 4.81 RATIO — HIGH (ref 0.88–1.16)
INR PPP: 1.11 RATIO
INSULIN SERPL-MCNC: 59.6 UU/ML
INTUBATED: SIGNIFICANT CHANGE UP
IRON SATN MFR SERPL: 17 UG/DL — LOW (ref 45–165)
IRON SATN MFR SERPL: 8 % — LOW (ref 14–50)
IRON SERPL-MCNC: 23 UG/DL
IRON SERPL-MCNC: 37 UG/DL
K OXYTOCA DNA BLD POS QL NAA+NON-PROBE: SIGNIFICANT CHANGE UP
KETONES UR-MCNC: ABNORMAL
KETONES UR-MCNC: NEGATIVE — SIGNIFICANT CHANGE UP
KETONES UR-MCNC: NORMAL
KETONES UR-MCNC: SIGNIFICANT CHANGE UP
KETONES URINE: NEGATIVE
LACTATE BLDV-MCNC: 1.6 MMOL/L — SIGNIFICANT CHANGE UP (ref 0.5–2)
LACTATE BLDV-MCNC: 2.3 MMOL/L — HIGH (ref 0.5–2)
LACTATE BLDV-MCNC: 3.7 MMOL/L — HIGH (ref 0.5–2)
LACTATE BLDV-MCNC: 3.7 MMOL/L — HIGH (ref 0.5–2)
LACTATE BLDV-MCNC: 3.8 MMOL/L — HIGH (ref 0.5–2)
LACTATE BLDV-MCNC: 4.3 MMOL/L — CRITICAL HIGH (ref 0.5–2)
LACTATE BLDV-MCNC: 6 MMOL/L — CRITICAL HIGH (ref 0.5–2)
LACTATE BLDV-MCNC: 7.3 MMOL/L — CRITICAL HIGH (ref 0.5–2)
LACTATE SERPL-SCNC: 1.6 MMOL/L — SIGNIFICANT CHANGE UP (ref 0.7–2)
LACTATE SERPL-SCNC: 1.8 MMOL/L — SIGNIFICANT CHANGE UP (ref 0.5–2)
LACTATE SERPL-SCNC: 1.9 MMOL/L — SIGNIFICANT CHANGE UP (ref 0.5–2)
LACTATE SERPL-SCNC: 2.1 MMOL/L — HIGH (ref 0.5–2)
LACTATE SERPL-SCNC: 3.5 MMOL/L — HIGH (ref 0.5–2)
LACTATE SERPL-SCNC: 4.3 MMOL/L — CRITICAL HIGH (ref 0.5–2)
LACTATE SERPL-SCNC: 4.4 MMOL/L — CRITICAL HIGH (ref 0.5–2)
LACTATE SERPL-SCNC: 8.4 MMOL/L — CRITICAL HIGH (ref 0.5–2)
LDH SERPL L TO P-CCNC: 195 U/L — SIGNIFICANT CHANGE UP (ref 135–225)
LDH SERPL L TO P-CCNC: 201 U/L — SIGNIFICANT CHANGE UP (ref 135–225)
LDH SERPL L TO P-CCNC: 212 U/L — SIGNIFICANT CHANGE UP (ref 135–225)
LDH SERPL L TO P-CCNC: 220 U/L — SIGNIFICANT CHANGE UP (ref 135–225)
LDLC SERPL CALC-MCNC: 32 MG/DL
LDLC SERPL CALC-MCNC: 50 MG/DL
LDLC SERPL CALC-MCNC: 55 MG/DL
LDLC SERPL CALC-MCNC: 57 MG/DL
LEUKOCYTE ESTERASE UR QL STRIP: NEGATIVE
LEUKOCYTE ESTERASE UR-ACNC: ABNORMAL
LEUKOCYTE ESTERASE UR-ACNC: ABNORMAL
LEUKOCYTE ESTERASE UR-ACNC: NEGATIVE — SIGNIFICANT CHANGE UP
LEUKOCYTE ESTERASE URINE: NEGATIVE
LIDOCAIN IGE QN: 96 U/L — HIGH (ref 7–60)
LIPID PNL WITH DIRECT LDL SERPL: 34 MG/DL — SIGNIFICANT CHANGE UP
LYMPHOCYTES # BLD AUTO: 0.21 K/UL — LOW (ref 1–3.3)
LYMPHOCYTES # BLD AUTO: 0.36 K/UL — LOW (ref 1–3.3)
LYMPHOCYTES # BLD AUTO: 0.38 K/UL
LYMPHOCYTES # BLD AUTO: 0.39 K/UL
LYMPHOCYTES # BLD AUTO: 0.43 K/UL — LOW (ref 1–3.3)
LYMPHOCYTES # BLD AUTO: 0.49 K/UL — LOW (ref 1–3.3)
LYMPHOCYTES # BLD AUTO: 0.5 K/UL
LYMPHOCYTES # BLD AUTO: 0.51 K/UL — LOW (ref 1–3.3)
LYMPHOCYTES # BLD AUTO: 0.55 K/UL
LYMPHOCYTES # BLD AUTO: 0.56 K/UL — LOW (ref 1–3.3)
LYMPHOCYTES # BLD AUTO: 0.57 K/UL — LOW (ref 1–3.3)
LYMPHOCYTES # BLD AUTO: 0.59 K/UL
LYMPHOCYTES # BLD AUTO: 0.59 K/UL — LOW (ref 1–3.3)
LYMPHOCYTES # BLD AUTO: 0.63 K/UL — LOW (ref 1–3.3)
LYMPHOCYTES # BLD AUTO: 0.73 K/UL — LOW (ref 1–3.3)
LYMPHOCYTES # BLD AUTO: 0.73 K/UL — LOW (ref 1–3.3)
LYMPHOCYTES # BLD AUTO: 0.75 K/UL — LOW (ref 1–3.3)
LYMPHOCYTES # BLD AUTO: 0.77 K/UL — LOW (ref 1–3.3)
LYMPHOCYTES # BLD AUTO: 0.88 K/UL — LOW (ref 1–3.3)
LYMPHOCYTES # BLD AUTO: 10.4 % — LOW (ref 13–44)
LYMPHOCYTES # BLD AUTO: 11.4 % — LOW (ref 13–44)
LYMPHOCYTES # BLD AUTO: 13.5 % — SIGNIFICANT CHANGE UP (ref 13–44)
LYMPHOCYTES # BLD AUTO: 14 % — SIGNIFICANT CHANGE UP (ref 13–44)
LYMPHOCYTES # BLD AUTO: 14.1 % — SIGNIFICANT CHANGE UP (ref 13–44)
LYMPHOCYTES # BLD AUTO: 16.8 % — SIGNIFICANT CHANGE UP (ref 13–44)
LYMPHOCYTES # BLD AUTO: 19.1 % — SIGNIFICANT CHANGE UP (ref 13–44)
LYMPHOCYTES # BLD AUTO: 21 % — SIGNIFICANT CHANGE UP (ref 13–44)
LYMPHOCYTES # BLD AUTO: 22.3 % — SIGNIFICANT CHANGE UP (ref 13–44)
LYMPHOCYTES # BLD AUTO: 24 % — SIGNIFICANT CHANGE UP (ref 13–44)
LYMPHOCYTES # BLD AUTO: 6 % — LOW (ref 13–44)
LYMPHOCYTES # BLD AUTO: 6.1 % — LOW (ref 13–44)
LYMPHOCYTES # BLD AUTO: 6.4 % — LOW (ref 13–44)
LYMPHOCYTES # BLD AUTO: 9.7 % — LOW (ref 13–44)
LYMPHOCYTES NFR BLD AUTO: 11.4 %
LYMPHOCYTES NFR BLD AUTO: 12.9 %
LYMPHOCYTES NFR BLD AUTO: 13.1 %
LYMPHOCYTES NFR BLD AUTO: 14.6 %
LYMPHOCYTES NFR BLD AUTO: 9.8 %
M PNEUMO DNA SPEC QL NAA+PROBE: SIGNIFICANT CHANGE UP
MACROCYTES BLD QL: SIGNIFICANT CHANGE UP
MACROCYTES BLD QL: SLIGHT — SIGNIFICANT CHANGE UP
MACROCYTES BLD QL: SLIGHT — SIGNIFICANT CHANGE UP
MAGNESIUM SERPL-MCNC: 1.5 MG/DL — LOW (ref 1.6–2.6)
MAGNESIUM SERPL-MCNC: 1.6 MG/DL — SIGNIFICANT CHANGE UP (ref 1.6–2.6)
MAGNESIUM SERPL-MCNC: 1.7 MG/DL — SIGNIFICANT CHANGE UP (ref 1.6–2.6)
MAGNESIUM SERPL-MCNC: 1.8 MG/DL
MAGNESIUM SERPL-MCNC: 1.8 MG/DL
MAGNESIUM SERPL-MCNC: 1.8 MG/DL — SIGNIFICANT CHANGE UP (ref 1.6–2.6)
MAGNESIUM SERPL-MCNC: 1.9 MG/DL — SIGNIFICANT CHANGE UP (ref 1.6–2.6)
MAGNESIUM SERPL-MCNC: 1.9 MG/DL — SIGNIFICANT CHANGE UP (ref 1.6–2.6)
MAGNESIUM SERPL-MCNC: 2 MG/DL
MAGNESIUM SERPL-MCNC: 2.2 MG/DL — SIGNIFICANT CHANGE UP (ref 1.6–2.6)
MAGNESIUM SERPL-MCNC: 2.2 MG/DL — SIGNIFICANT CHANGE UP (ref 1.6–2.6)
MAGNESIUM SERPL-MCNC: 2.3 MG/DL
MAN DIFF?: NORMAL
MANUAL SMEAR VERIFICATION: SIGNIFICANT CHANGE UP
MCHC RBC-ENTMCNC: 27.8 PG — SIGNIFICANT CHANGE UP (ref 27–34)
MCHC RBC-ENTMCNC: 27.9 PG — SIGNIFICANT CHANGE UP (ref 27–34)
MCHC RBC-ENTMCNC: 28 PG — SIGNIFICANT CHANGE UP (ref 27–34)
MCHC RBC-ENTMCNC: 28 PG — SIGNIFICANT CHANGE UP (ref 27–34)
MCHC RBC-ENTMCNC: 28.1 PG — SIGNIFICANT CHANGE UP (ref 27–34)
MCHC RBC-ENTMCNC: 28.2 PG — SIGNIFICANT CHANGE UP (ref 27–34)
MCHC RBC-ENTMCNC: 28.3 PG — SIGNIFICANT CHANGE UP (ref 27–34)
MCHC RBC-ENTMCNC: 28.4 PG
MCHC RBC-ENTMCNC: 28.4 PG — SIGNIFICANT CHANGE UP (ref 27–34)
MCHC RBC-ENTMCNC: 28.5 PG — SIGNIFICANT CHANGE UP (ref 27–34)
MCHC RBC-ENTMCNC: 28.6 GM/DL
MCHC RBC-ENTMCNC: 28.6 PG — SIGNIFICANT CHANGE UP (ref 27–34)
MCHC RBC-ENTMCNC: 28.6 PG — SIGNIFICANT CHANGE UP (ref 27–34)
MCHC RBC-ENTMCNC: 28.7 GM/DL
MCHC RBC-ENTMCNC: 28.7 PG — SIGNIFICANT CHANGE UP (ref 27–34)
MCHC RBC-ENTMCNC: 28.8 PG — SIGNIFICANT CHANGE UP (ref 27–34)
MCHC RBC-ENTMCNC: 28.9 GM/DL
MCHC RBC-ENTMCNC: 28.9 PG — SIGNIFICANT CHANGE UP (ref 27–34)
MCHC RBC-ENTMCNC: 29 PG — SIGNIFICANT CHANGE UP (ref 27–34)
MCHC RBC-ENTMCNC: 29.1 PG — SIGNIFICANT CHANGE UP (ref 27–34)
MCHC RBC-ENTMCNC: 29.1 PG — SIGNIFICANT CHANGE UP (ref 27–34)
MCHC RBC-ENTMCNC: 29.2 PG
MCHC RBC-ENTMCNC: 29.3 PG — SIGNIFICANT CHANGE UP (ref 27–34)
MCHC RBC-ENTMCNC: 29.3 PG — SIGNIFICANT CHANGE UP (ref 27–34)
MCHC RBC-ENTMCNC: 29.4 GM/DL — LOW (ref 32–36)
MCHC RBC-ENTMCNC: 29.4 PG — SIGNIFICANT CHANGE UP (ref 27–34)
MCHC RBC-ENTMCNC: 29.4 PG — SIGNIFICANT CHANGE UP (ref 27–34)
MCHC RBC-ENTMCNC: 29.6 PG — SIGNIFICANT CHANGE UP (ref 27–34)
MCHC RBC-ENTMCNC: 29.9 GM/DL
MCHC RBC-ENTMCNC: 29.9 PG
MCHC RBC-ENTMCNC: 29.9 PG — SIGNIFICANT CHANGE UP (ref 27–34)
MCHC RBC-ENTMCNC: 30 GM/DL
MCHC RBC-ENTMCNC: 30 GM/DL — LOW (ref 32–36)
MCHC RBC-ENTMCNC: 30 PG
MCHC RBC-ENTMCNC: 30.1 GM/DL — LOW (ref 32–36)
MCHC RBC-ENTMCNC: 30.1 PG
MCHC RBC-ENTMCNC: 30.1 PG — SIGNIFICANT CHANGE UP (ref 27–34)
MCHC RBC-ENTMCNC: 30.3 GM/DL — LOW (ref 32–36)
MCHC RBC-ENTMCNC: 30.4 G/DL — LOW (ref 32–36)
MCHC RBC-ENTMCNC: 30.6 PG — SIGNIFICANT CHANGE UP (ref 27–34)
MCHC RBC-ENTMCNC: 30.7 PG — SIGNIFICANT CHANGE UP (ref 27–34)
MCHC RBC-ENTMCNC: 30.8 GM/DL — LOW (ref 32–36)
MCHC RBC-ENTMCNC: 30.8 PG — SIGNIFICANT CHANGE UP (ref 27–34)
MCHC RBC-ENTMCNC: 30.9 PG — SIGNIFICANT CHANGE UP (ref 27–34)
MCHC RBC-ENTMCNC: 31 GM/DL — LOW (ref 32–36)
MCHC RBC-ENTMCNC: 31.1 G/DL — LOW (ref 32–36)
MCHC RBC-ENTMCNC: 31.1 PG — SIGNIFICANT CHANGE UP (ref 27–34)
MCHC RBC-ENTMCNC: 31.2 GM/DL — LOW (ref 32–36)
MCHC RBC-ENTMCNC: 31.2 PG — SIGNIFICANT CHANGE UP (ref 27–34)
MCHC RBC-ENTMCNC: 31.2 PG — SIGNIFICANT CHANGE UP (ref 27–34)
MCHC RBC-ENTMCNC: 31.3 GM/DL — LOW (ref 32–36)
MCHC RBC-ENTMCNC: 31.3 PG — SIGNIFICANT CHANGE UP (ref 27–34)
MCHC RBC-ENTMCNC: 31.4 PG — SIGNIFICANT CHANGE UP (ref 27–34)
MCHC RBC-ENTMCNC: 31.4 PG — SIGNIFICANT CHANGE UP (ref 27–34)
MCHC RBC-ENTMCNC: 31.5 GM/DL — LOW (ref 32–36)
MCHC RBC-ENTMCNC: 31.7 GM/DL — LOW (ref 32–36)
MCHC RBC-ENTMCNC: 31.7 GM/DL — LOW (ref 32–36)
MCHC RBC-ENTMCNC: 31.8 GM/DL — LOW (ref 32–36)
MCHC RBC-ENTMCNC: 31.9 GM/DL — LOW (ref 32–36)
MCHC RBC-ENTMCNC: 31.9 GM/DL — LOW (ref 32–36)
MCHC RBC-ENTMCNC: 32 GM/DL — SIGNIFICANT CHANGE UP (ref 32–36)
MCHC RBC-ENTMCNC: 32.1 GM/DL — SIGNIFICANT CHANGE UP (ref 32–36)
MCHC RBC-ENTMCNC: 32.1 GM/DL — SIGNIFICANT CHANGE UP (ref 32–36)
MCHC RBC-ENTMCNC: 32.2 GM/DL — SIGNIFICANT CHANGE UP (ref 32–36)
MCHC RBC-ENTMCNC: 32.3 GM/DL — SIGNIFICANT CHANGE UP (ref 32–36)
MCHC RBC-ENTMCNC: 32.4 GM/DL — SIGNIFICANT CHANGE UP (ref 32–36)
MCHC RBC-ENTMCNC: 32.5 GM/DL — SIGNIFICANT CHANGE UP (ref 32–36)
MCHC RBC-ENTMCNC: 32.6 GM/DL — SIGNIFICANT CHANGE UP (ref 32–36)
MCHC RBC-ENTMCNC: 32.7 GM/DL — SIGNIFICANT CHANGE UP (ref 32–36)
MCHC RBC-ENTMCNC: 32.8 GM/DL — SIGNIFICANT CHANGE UP (ref 32–36)
MCHC RBC-ENTMCNC: 32.8 GM/DL — SIGNIFICANT CHANGE UP (ref 32–36)
MCHC RBC-ENTMCNC: 32.9 GM/DL — SIGNIFICANT CHANGE UP (ref 32–36)
MCHC RBC-ENTMCNC: 32.9 GM/DL — SIGNIFICANT CHANGE UP (ref 32–36)
MCHC RBC-ENTMCNC: 33.5 GM/DL — SIGNIFICANT CHANGE UP (ref 32–36)
MCHC RBC-ENTMCNC: 33.6 GM/DL — SIGNIFICANT CHANGE UP (ref 32–36)
MCV RBC AUTO: 100.3 FL
MCV RBC AUTO: 100.3 FL
MCV RBC AUTO: 102.2 FL
MCV RBC AUTO: 104 FL
MCV RBC AUTO: 86.8 FL — SIGNIFICANT CHANGE UP (ref 80–100)
MCV RBC AUTO: 86.8 FL — SIGNIFICANT CHANGE UP (ref 80–100)
MCV RBC AUTO: 87.1 FL — SIGNIFICANT CHANGE UP (ref 80–100)
MCV RBC AUTO: 87.4 FL — SIGNIFICANT CHANGE UP (ref 80–100)
MCV RBC AUTO: 87.6 FL — SIGNIFICANT CHANGE UP (ref 80–100)
MCV RBC AUTO: 87.8 FL — SIGNIFICANT CHANGE UP (ref 80–100)
MCV RBC AUTO: 88 FL — SIGNIFICANT CHANGE UP (ref 80–100)
MCV RBC AUTO: 88.5 FL — SIGNIFICANT CHANGE UP (ref 80–100)
MCV RBC AUTO: 88.7 FL — SIGNIFICANT CHANGE UP (ref 80–100)
MCV RBC AUTO: 88.8 FL — SIGNIFICANT CHANGE UP (ref 80–100)
MCV RBC AUTO: 88.9 FL — SIGNIFICANT CHANGE UP (ref 80–100)
MCV RBC AUTO: 88.9 FL — SIGNIFICANT CHANGE UP (ref 80–100)
MCV RBC AUTO: 89 FL — SIGNIFICANT CHANGE UP (ref 80–100)
MCV RBC AUTO: 89.1 FL — SIGNIFICANT CHANGE UP (ref 80–100)
MCV RBC AUTO: 89.3 FL — SIGNIFICANT CHANGE UP (ref 80–100)
MCV RBC AUTO: 89.3 FL — SIGNIFICANT CHANGE UP (ref 80–100)
MCV RBC AUTO: 89.6 FL — SIGNIFICANT CHANGE UP (ref 80–100)
MCV RBC AUTO: 89.6 FL — SIGNIFICANT CHANGE UP (ref 80–100)
MCV RBC AUTO: 89.8 FL — SIGNIFICANT CHANGE UP (ref 80–100)
MCV RBC AUTO: 89.8 FL — SIGNIFICANT CHANGE UP (ref 80–100)
MCV RBC AUTO: 89.9 FL — SIGNIFICANT CHANGE UP (ref 80–100)
MCV RBC AUTO: 90 FL — SIGNIFICANT CHANGE UP (ref 80–100)
MCV RBC AUTO: 90 FL — SIGNIFICANT CHANGE UP (ref 80–100)
MCV RBC AUTO: 90.1 FL — SIGNIFICANT CHANGE UP (ref 80–100)
MCV RBC AUTO: 90.3 FL — SIGNIFICANT CHANGE UP (ref 80–100)
MCV RBC AUTO: 90.3 FL — SIGNIFICANT CHANGE UP (ref 80–100)
MCV RBC AUTO: 90.4 FL — SIGNIFICANT CHANGE UP (ref 80–100)
MCV RBC AUTO: 90.8 FL — SIGNIFICANT CHANGE UP (ref 80–100)
MCV RBC AUTO: 91 FL — SIGNIFICANT CHANGE UP (ref 80–100)
MCV RBC AUTO: 91.2 FL — SIGNIFICANT CHANGE UP (ref 80–100)
MCV RBC AUTO: 91.4 FL — SIGNIFICANT CHANGE UP (ref 80–100)
MCV RBC AUTO: 91.7 FL — SIGNIFICANT CHANGE UP (ref 80–100)
MCV RBC AUTO: 91.8 FL — SIGNIFICANT CHANGE UP (ref 80–100)
MCV RBC AUTO: 92.1 FL — SIGNIFICANT CHANGE UP (ref 80–100)
MCV RBC AUTO: 92.4 FL — SIGNIFICANT CHANGE UP (ref 80–100)
MCV RBC AUTO: 92.6 FL — SIGNIFICANT CHANGE UP (ref 80–100)
MCV RBC AUTO: 92.9 FL — SIGNIFICANT CHANGE UP (ref 80–100)
MCV RBC AUTO: 93.1 FL — SIGNIFICANT CHANGE UP (ref 80–100)
MCV RBC AUTO: 93.2 FL — SIGNIFICANT CHANGE UP (ref 80–100)
MCV RBC AUTO: 93.5 FL — SIGNIFICANT CHANGE UP (ref 80–100)
MCV RBC AUTO: 94 FL — SIGNIFICANT CHANGE UP (ref 80–100)
MCV RBC AUTO: 94.8 FL — SIGNIFICANT CHANGE UP (ref 80–100)
MCV RBC AUTO: 95 FL — SIGNIFICANT CHANGE UP (ref 80–100)
MCV RBC AUTO: 95.1 FL — SIGNIFICANT CHANGE UP (ref 80–100)
MCV RBC AUTO: 95.3 FL — SIGNIFICANT CHANGE UP (ref 80–100)
MCV RBC AUTO: 95.8 FL — SIGNIFICANT CHANGE UP (ref 80–100)
MCV RBC AUTO: 95.9 FL — SIGNIFICANT CHANGE UP (ref 80–100)
MCV RBC AUTO: 96.1 FL — SIGNIFICANT CHANGE UP (ref 80–100)
MCV RBC AUTO: 96.1 FL — SIGNIFICANT CHANGE UP (ref 80–100)
MCV RBC AUTO: 96.3 FL — SIGNIFICANT CHANGE UP (ref 80–100)
MCV RBC AUTO: 96.4 FL — SIGNIFICANT CHANGE UP (ref 80–100)
MCV RBC AUTO: 98.3 FL — SIGNIFICANT CHANGE UP (ref 80–100)
MCV RBC AUTO: 98.5 FL
MCV RBC AUTO: 99 FL — SIGNIFICANT CHANGE UP (ref 80–100)
METHGB MFR BLDA: 0.1 % — SIGNIFICANT CHANGE UP
METHOD TYPE: SIGNIFICANT CHANGE UP
MISCELLANEOUS TEST: NORMAL
MISCELLANEOUS TEST: NORMAL
MONOCYTES # BLD AUTO: 0.28 K/UL — SIGNIFICANT CHANGE UP (ref 0–0.9)
MONOCYTES # BLD AUTO: 0.31 K/UL — SIGNIFICANT CHANGE UP (ref 0–0.9)
MONOCYTES # BLD AUTO: 0.33 K/UL — SIGNIFICANT CHANGE UP (ref 0–0.9)
MONOCYTES # BLD AUTO: 0.35 K/UL — SIGNIFICANT CHANGE UP (ref 0–0.9)
MONOCYTES # BLD AUTO: 0.36 K/UL — SIGNIFICANT CHANGE UP (ref 0–0.9)
MONOCYTES # BLD AUTO: 0.45 K/UL
MONOCYTES # BLD AUTO: 0.45 K/UL — SIGNIFICANT CHANGE UP (ref 0–0.9)
MONOCYTES # BLD AUTO: 0.48 K/UL
MONOCYTES # BLD AUTO: 0.55 K/UL
MONOCYTES # BLD AUTO: 0.59 K/UL — SIGNIFICANT CHANGE UP (ref 0–0.9)
MONOCYTES # BLD AUTO: 0.6 K/UL — SIGNIFICANT CHANGE UP (ref 0–0.9)
MONOCYTES # BLD AUTO: 0.64 K/UL
MONOCYTES # BLD AUTO: 0.66 K/UL — SIGNIFICANT CHANGE UP (ref 0–0.9)
MONOCYTES # BLD AUTO: 0.67 K/UL — SIGNIFICANT CHANGE UP (ref 0–0.9)
MONOCYTES # BLD AUTO: 0.72 K/UL
MONOCYTES # BLD AUTO: 0.72 K/UL — SIGNIFICANT CHANGE UP (ref 0–0.9)
MONOCYTES # BLD AUTO: 0.73 K/UL — SIGNIFICANT CHANGE UP (ref 0–0.9)
MONOCYTES # BLD AUTO: 0.9 K/UL — SIGNIFICANT CHANGE UP (ref 0–0.9)
MONOCYTES # BLD AUTO: 1.08 K/UL — HIGH (ref 0–0.9)
MONOCYTES NFR BLD AUTO: 10 % — SIGNIFICANT CHANGE UP (ref 2–14)
MONOCYTES NFR BLD AUTO: 10.3 % — SIGNIFICANT CHANGE UP (ref 2–14)
MONOCYTES NFR BLD AUTO: 11.3 % — SIGNIFICANT CHANGE UP (ref 2–14)
MONOCYTES NFR BLD AUTO: 11.6 %
MONOCYTES NFR BLD AUTO: 12.1 %
MONOCYTES NFR BLD AUTO: 13.5 % — SIGNIFICANT CHANGE UP (ref 2–14)
MONOCYTES NFR BLD AUTO: 14 %
MONOCYTES NFR BLD AUTO: 14 % — SIGNIFICANT CHANGE UP (ref 2–14)
MONOCYTES NFR BLD AUTO: 14.4 % — HIGH (ref 2–14)
MONOCYTES NFR BLD AUTO: 14.4 % — HIGH (ref 2–14)
MONOCYTES NFR BLD AUTO: 15.2 % — HIGH (ref 2–14)
MONOCYTES NFR BLD AUTO: 16.8 %
MONOCYTES NFR BLD AUTO: 17 % — HIGH (ref 2–14)
MONOCYTES NFR BLD AUTO: 17.7 % — HIGH (ref 2–14)
MONOCYTES NFR BLD AUTO: 19 %
MONOCYTES NFR BLD AUTO: 8 % — SIGNIFICANT CHANGE UP (ref 2–14)
MONOCYTES NFR BLD AUTO: 8.7 % — SIGNIFICANT CHANGE UP (ref 2–14)
MONOCYTES NFR BLD AUTO: 9.2 % — SIGNIFICANT CHANGE UP (ref 2–14)
MONOCYTES NFR BLD AUTO: 9.6 % — SIGNIFICANT CHANGE UP (ref 2–14)
MRSA PCR RESULT.: SIGNIFICANT CHANGE UP
MRSA PCR RESULT.: SIGNIFICANT CHANGE UP
NEUTROPHILS # BLD AUTO: 2.07 K/UL — SIGNIFICANT CHANGE UP (ref 1.8–7.4)
NEUTROPHILS # BLD AUTO: 2.08 K/UL — SIGNIFICANT CHANGE UP (ref 1.8–7.4)
NEUTROPHILS # BLD AUTO: 2.27 K/UL — SIGNIFICANT CHANGE UP (ref 1.8–7.4)
NEUTROPHILS # BLD AUTO: 2.4 K/UL — SIGNIFICANT CHANGE UP (ref 1.8–7.4)
NEUTROPHILS # BLD AUTO: 2.45 K/UL
NEUTROPHILS # BLD AUTO: 2.52 K/UL
NEUTROPHILS # BLD AUTO: 2.53 K/UL — SIGNIFICANT CHANGE UP (ref 1.8–7.4)
NEUTROPHILS # BLD AUTO: 2.56 K/UL
NEUTROPHILS # BLD AUTO: 2.59 K/UL — SIGNIFICANT CHANGE UP (ref 1.8–7.4)
NEUTROPHILS # BLD AUTO: 2.85 K/UL — SIGNIFICANT CHANGE UP (ref 1.8–7.4)
NEUTROPHILS # BLD AUTO: 2.89 K/UL — SIGNIFICANT CHANGE UP (ref 1.8–7.4)
NEUTROPHILS # BLD AUTO: 3.01 K/UL
NEUTROPHILS # BLD AUTO: 3.26 K/UL — SIGNIFICANT CHANGE UP (ref 1.8–7.4)
NEUTROPHILS # BLD AUTO: 3.35 K/UL
NEUTROPHILS # BLD AUTO: 3.65 K/UL — SIGNIFICANT CHANGE UP (ref 1.8–7.4)
NEUTROPHILS # BLD AUTO: 3.77 K/UL — SIGNIFICANT CHANGE UP (ref 1.8–7.4)
NEUTROPHILS # BLD AUTO: 4.32 K/UL — SIGNIFICANT CHANGE UP (ref 1.8–7.4)
NEUTROPHILS # BLD AUTO: 6.03 K/UL — SIGNIFICANT CHANGE UP (ref 1.8–7.4)
NEUTROPHILS # BLD AUTO: 6.47 K/UL — SIGNIFICANT CHANGE UP (ref 1.8–7.4)
NEUTROPHILS NFR BLD AUTO: 59.7 % — SIGNIFICANT CHANGE UP (ref 43–77)
NEUTROPHILS NFR BLD AUTO: 60.7 % — SIGNIFICANT CHANGE UP (ref 43–77)
NEUTROPHILS NFR BLD AUTO: 64.8 %
NEUTROPHILS NFR BLD AUTO: 66 % — SIGNIFICANT CHANGE UP (ref 43–77)
NEUTROPHILS NFR BLD AUTO: 67.2 %
NEUTROPHILS NFR BLD AUTO: 67.9 % — SIGNIFICANT CHANGE UP (ref 43–77)
NEUTROPHILS NFR BLD AUTO: 70 % — SIGNIFICANT CHANGE UP (ref 43–77)
NEUTROPHILS NFR BLD AUTO: 70.9 % — SIGNIFICANT CHANGE UP (ref 43–77)
NEUTROPHILS NFR BLD AUTO: 71.3 % — SIGNIFICANT CHANGE UP (ref 43–77)
NEUTROPHILS NFR BLD AUTO: 72 % — SIGNIFICANT CHANGE UP (ref 43–77)
NEUTROPHILS NFR BLD AUTO: 72.4 % — SIGNIFICANT CHANGE UP (ref 43–77)
NEUTROPHILS NFR BLD AUTO: 72.8 % — SIGNIFICANT CHANGE UP (ref 43–77)
NEUTROPHILS NFR BLD AUTO: 73.4 %
NEUTROPHILS NFR BLD AUTO: 73.5 %
NEUTROPHILS NFR BLD AUTO: 75.2 % — SIGNIFICANT CHANGE UP (ref 43–77)
NEUTROPHILS NFR BLD AUTO: 77.7 %
NEUTROPHILS NFR BLD AUTO: 81.4 % — HIGH (ref 43–77)
NEUTROPHILS NFR BLD AUTO: 82.5 % — HIGH (ref 43–77)
NEUTROPHILS NFR BLD AUTO: 83.5 % — HIGH (ref 43–77)
NITRITE UR QL STRIP: NEGATIVE
NITRITE UR-MCNC: NEGATIVE — SIGNIFICANT CHANGE UP
NITRITE URINE: NEGATIVE
NON HDL CHOLESTEROL: 52 MG/DL — SIGNIFICANT CHANGE UP
NON-GYNECOLOGICAL CYTOLOGY STUDY: SIGNIFICANT CHANGE UP
NONHDLC SERPL-MCNC: 43 MG/DL
NONHDLC SERPL-MCNC: 66 MG/DL
NONHDLC SERPL-MCNC: 69 MG/DL
NONHDLC SERPL-MCNC: 75 MG/DL
NRBC # BLD: 0 /100 WBCS — SIGNIFICANT CHANGE UP (ref 0–0)
NRBC # BLD: 0 /100 — SIGNIFICANT CHANGE UP (ref 0–0)
NRBC # BLD: 0 /100 — SIGNIFICANT CHANGE UP (ref 0–0)
NRBC # BLD: 1 /100 WBCS — HIGH (ref 0–0)
NRBC # BLD: 2 /100 WBCS — HIGH (ref 0–0)
NRBC # BLD: SIGNIFICANT CHANGE UP /100 WBCS (ref 0–0)
NRBC # FLD: 0 K/UL — SIGNIFICANT CHANGE UP (ref 0–0)
NT-PROBNP SERPL-SCNC: 171 PG/ML — SIGNIFICANT CHANGE UP (ref 0–300)
NT-PROBNP SERPL-SCNC: 3006 PG/ML — HIGH
NT-PROBNP SERPL-SCNC: 3874 PG/ML — HIGH (ref 0–300)
ORGANISM # SPEC MICROSCOPIC CNT: SIGNIFICANT CHANGE UP
OSMOLALITY UR: 629 MOSM/KG — SIGNIFICANT CHANGE UP (ref 50–1200)
OTHER CELLS CSF MANUAL: 6.9 ML/DL — LOW (ref 18–22)
OVALOCYTES BLD QL SMEAR: SLIGHT — SIGNIFICANT CHANGE UP
OXYHGB MFR BLDA: 85.9 % — LOW (ref 90–95)
PANCREATIC ELASTASE, FECAL: <50
PCO2 BLDA: 33 MMHG — LOW (ref 35–48)
PCO2 BLDA: 49 MMHG — HIGH (ref 35–48)
PCO2 BLDV: 26 MMHG — LOW (ref 42–55)
PCO2 BLDV: 35 MMHG — LOW (ref 42–55)
PCO2 BLDV: 36 MMHG — LOW (ref 42–55)
PCO2 BLDV: 36 MMHG — LOW (ref 42–55)
PCO2 BLDV: 44 MMHG — SIGNIFICANT CHANGE UP (ref 42–55)
PCO2 BLDV: 48 MMHG — SIGNIFICANT CHANGE UP (ref 42–55)
PH BLDA: 7.24 — LOW (ref 7.35–7.45)
PH BLDA: 7.37 — SIGNIFICANT CHANGE UP (ref 7.35–7.45)
PH BLDV: 7.33 — SIGNIFICANT CHANGE UP (ref 7.32–7.43)
PH BLDV: 7.35 — SIGNIFICANT CHANGE UP (ref 7.32–7.43)
PH BLDV: 7.36 — SIGNIFICANT CHANGE UP (ref 7.32–7.43)
PH BLDV: 7.37 — SIGNIFICANT CHANGE UP (ref 7.32–7.43)
PH BLDV: 7.37 — SIGNIFICANT CHANGE UP (ref 7.32–7.43)
PH BLDV: 7.4 — SIGNIFICANT CHANGE UP (ref 7.32–7.43)
PH UR STRIP: 5.5
PH UR: 5 — SIGNIFICANT CHANGE UP (ref 5–8)
PH UR: 6 — SIGNIFICANT CHANGE UP (ref 5–8)
PH UR: 6.5 — SIGNIFICANT CHANGE UP (ref 5–8)
PH URINE: 6
PH URINE: 6
PH URINE: 6.5
PH URINE: 6.5
PHOSPHATE SERPL-MCNC: 2.4 MG/DL — LOW (ref 2.5–4.5)
PHOSPHATE SERPL-MCNC: 2.5 MG/DL — SIGNIFICANT CHANGE UP (ref 2.5–4.5)
PHOSPHATE SERPL-MCNC: 2.6 MG/DL — SIGNIFICANT CHANGE UP (ref 2.5–4.5)
PHOSPHATE SERPL-MCNC: 2.7 MG/DL — SIGNIFICANT CHANGE UP (ref 2.5–4.5)
PHOSPHATE SERPL-MCNC: 2.8 MG/DL — SIGNIFICANT CHANGE UP (ref 2.5–4.5)
PHOSPHATE SERPL-MCNC: 2.9 MG/DL — SIGNIFICANT CHANGE UP (ref 2.5–4.5)
PHOSPHATE SERPL-MCNC: 3.1 MG/DL — SIGNIFICANT CHANGE UP (ref 2.5–4.5)
PHOSPHATE SERPL-MCNC: 3.2 MG/DL — SIGNIFICANT CHANGE UP (ref 2.5–4.5)
PHOSPHATE SERPL-MCNC: 3.3 MG/DL — SIGNIFICANT CHANGE UP (ref 2.5–4.5)
PHOSPHATE SERPL-MCNC: 3.3 MG/DL — SIGNIFICANT CHANGE UP (ref 2.5–4.5)
PHOSPHATE SERPL-MCNC: 3.4 MG/DL — SIGNIFICANT CHANGE UP (ref 2.5–4.5)
PHOSPHATE SERPL-MCNC: 3.5 MG/DL — SIGNIFICANT CHANGE UP (ref 2.5–4.5)
PHOSPHATE SERPL-MCNC: 3.6 MG/DL — SIGNIFICANT CHANGE UP (ref 2.5–4.5)
PHOSPHATE SERPL-MCNC: 3.7 MG/DL — SIGNIFICANT CHANGE UP (ref 2.5–4.5)
PHOSPHATE SERPL-MCNC: 3.7 MG/DL — SIGNIFICANT CHANGE UP (ref 2.5–4.5)
PHOSPHATE SERPL-MCNC: 3.8 MG/DL — SIGNIFICANT CHANGE UP (ref 2.5–4.5)
PHOSPHATE SERPL-MCNC: 3.8 MG/DL — SIGNIFICANT CHANGE UP (ref 2.5–4.5)
PHOSPHATE SERPL-MCNC: 3.9 MG/DL — SIGNIFICANT CHANGE UP (ref 2.5–4.5)
PHOSPHATE SERPL-MCNC: 5.2 MG/DL — HIGH (ref 2.5–4.5)
PLAT MORPH BLD: NORMAL — SIGNIFICANT CHANGE UP
PLATELET # BLD AUTO: 123 K/UL — LOW (ref 150–400)
PLATELET # BLD AUTO: 126 K/UL — LOW (ref 150–400)
PLATELET # BLD AUTO: 128 K/UL — LOW (ref 150–400)
PLATELET # BLD AUTO: 132 K/UL — LOW (ref 150–400)
PLATELET # BLD AUTO: 132 K/UL — LOW (ref 150–400)
PLATELET # BLD AUTO: 134 K/UL — LOW (ref 150–400)
PLATELET # BLD AUTO: 135 K/UL — LOW (ref 150–400)
PLATELET # BLD AUTO: 135 K/UL — LOW (ref 150–400)
PLATELET # BLD AUTO: 139 K/UL — LOW (ref 150–400)
PLATELET # BLD AUTO: 140 K/UL — LOW (ref 150–400)
PLATELET # BLD AUTO: 140 K/UL — LOW (ref 150–400)
PLATELET # BLD AUTO: 143 K/UL — LOW (ref 150–400)
PLATELET # BLD AUTO: 145 K/UL — LOW (ref 150–400)
PLATELET # BLD AUTO: 146 K/UL — LOW (ref 150–400)
PLATELET # BLD AUTO: 148 K/UL — LOW (ref 150–400)
PLATELET # BLD AUTO: 149 K/UL — LOW (ref 150–400)
PLATELET # BLD AUTO: 153 K/UL — SIGNIFICANT CHANGE UP (ref 150–400)
PLATELET # BLD AUTO: 153 K/UL — SIGNIFICANT CHANGE UP (ref 150–400)
PLATELET # BLD AUTO: 154 K/UL — SIGNIFICANT CHANGE UP (ref 150–400)
PLATELET # BLD AUTO: 155 K/UL — SIGNIFICANT CHANGE UP (ref 150–400)
PLATELET # BLD AUTO: 156 K/UL — SIGNIFICANT CHANGE UP (ref 150–400)
PLATELET # BLD AUTO: 156 K/UL — SIGNIFICANT CHANGE UP (ref 150–400)
PLATELET # BLD AUTO: 157 K/UL — SIGNIFICANT CHANGE UP (ref 150–400)
PLATELET # BLD AUTO: 159 K/UL — SIGNIFICANT CHANGE UP (ref 150–400)
PLATELET # BLD AUTO: 162 K/UL — SIGNIFICANT CHANGE UP (ref 150–400)
PLATELET # BLD AUTO: 162 K/UL — SIGNIFICANT CHANGE UP (ref 150–400)
PLATELET # BLD AUTO: 163 K/UL — SIGNIFICANT CHANGE UP (ref 150–400)
PLATELET # BLD AUTO: 164 K/UL — SIGNIFICANT CHANGE UP (ref 150–400)
PLATELET # BLD AUTO: 164 K/UL — SIGNIFICANT CHANGE UP (ref 150–400)
PLATELET # BLD AUTO: 165 K/UL — SIGNIFICANT CHANGE UP (ref 150–400)
PLATELET # BLD AUTO: 166 K/UL — SIGNIFICANT CHANGE UP (ref 150–400)
PLATELET # BLD AUTO: 167 K/UL — SIGNIFICANT CHANGE UP (ref 150–400)
PLATELET # BLD AUTO: 169 K/UL
PLATELET # BLD AUTO: 169 K/UL — SIGNIFICANT CHANGE UP (ref 150–400)
PLATELET # BLD AUTO: 173 K/UL — SIGNIFICANT CHANGE UP (ref 150–400)
PLATELET # BLD AUTO: 174 K/UL — SIGNIFICANT CHANGE UP (ref 150–400)
PLATELET # BLD AUTO: 176 K/UL — SIGNIFICANT CHANGE UP (ref 150–400)
PLATELET # BLD AUTO: 177 K/UL — SIGNIFICANT CHANGE UP (ref 150–400)
PLATELET # BLD AUTO: 177 K/UL — SIGNIFICANT CHANGE UP (ref 150–400)
PLATELET # BLD AUTO: 178 K/UL — SIGNIFICANT CHANGE UP (ref 150–400)
PLATELET # BLD AUTO: 178 K/UL — SIGNIFICANT CHANGE UP (ref 150–400)
PLATELET # BLD AUTO: 181 K/UL
PLATELET # BLD AUTO: 183 K/UL — SIGNIFICANT CHANGE UP (ref 150–400)
PLATELET # BLD AUTO: 184 K/UL — SIGNIFICANT CHANGE UP (ref 150–400)
PLATELET # BLD AUTO: 190 K/UL — SIGNIFICANT CHANGE UP (ref 150–400)
PLATELET # BLD AUTO: 198 K/UL
PLATELET # BLD AUTO: 199 K/UL
PLATELET # BLD AUTO: 204 K/UL — SIGNIFICANT CHANGE UP (ref 150–400)
PLATELET # BLD AUTO: 209 K/UL — SIGNIFICANT CHANGE UP (ref 150–400)
PLATELET # BLD AUTO: 213 K/UL — SIGNIFICANT CHANGE UP (ref 150–400)
PLATELET # BLD AUTO: 218 K/UL — SIGNIFICANT CHANGE UP (ref 150–400)
PLATELET # BLD AUTO: 223 K/UL
PLATELET # BLD AUTO: 227 K/UL — SIGNIFICANT CHANGE UP (ref 150–400)
PLATELET # BLD AUTO: 228 K/UL — SIGNIFICANT CHANGE UP (ref 150–400)
PLATELET COUNT - ESTIMATE: ABNORMAL
PLATELET COUNT - ESTIMATE: NORMAL — SIGNIFICANT CHANGE UP
PO2 BLDA: 57 MMHG — LOW (ref 83–108)
PO2 BLDA: 60 MMHG — LOW (ref 83–108)
PO2 BLDV: 21 MMHG — LOW (ref 25–45)
PO2 BLDV: 27 MMHG — SIGNIFICANT CHANGE UP (ref 25–45)
PO2 BLDV: 29 MMHG — SIGNIFICANT CHANGE UP (ref 25–45)
PO2 BLDV: 31 MMHG — SIGNIFICANT CHANGE UP
PO2 BLDV: 37 MMHG — SIGNIFICANT CHANGE UP (ref 25–45)
PO2 BLDV: 46 MMHG — HIGH (ref 25–45)
POIKILOCYTOSIS BLD QL AUTO: SIGNIFICANT CHANGE UP
POIKILOCYTOSIS BLD QL AUTO: SLIGHT — SIGNIFICANT CHANGE UP
POLYCHROMASIA BLD QL SMEAR: SLIGHT — SIGNIFICANT CHANGE UP
POTASSIUM BLDV-SCNC: 3.8 MMOL/L — SIGNIFICANT CHANGE UP (ref 3.5–5.1)
POTASSIUM BLDV-SCNC: 4.2 MMOL/L — SIGNIFICANT CHANGE UP (ref 3.5–5.1)
POTASSIUM BLDV-SCNC: 4.5 MMOL/L — SIGNIFICANT CHANGE UP (ref 3.5–5.1)
POTASSIUM BLDV-SCNC: 4.6 MMOL/L — SIGNIFICANT CHANGE UP (ref 3.5–5.1)
POTASSIUM BLDV-SCNC: 4.9 MMOL/L — SIGNIFICANT CHANGE UP (ref 3.5–5.1)
POTASSIUM BLDV-SCNC: 5 MMOL/L — SIGNIFICANT CHANGE UP (ref 3.5–5.1)
POTASSIUM SERPL-MCNC: 3.6 MMOL/L — SIGNIFICANT CHANGE UP (ref 3.5–5.3)
POTASSIUM SERPL-MCNC: 3.6 MMOL/L — SIGNIFICANT CHANGE UP (ref 3.5–5.3)
POTASSIUM SERPL-MCNC: 3.7 MMOL/L — SIGNIFICANT CHANGE UP (ref 3.5–5.3)
POTASSIUM SERPL-MCNC: 3.8 MMOL/L — SIGNIFICANT CHANGE UP (ref 3.5–5.3)
POTASSIUM SERPL-MCNC: 3.9 MMOL/L — SIGNIFICANT CHANGE UP (ref 3.5–5.3)
POTASSIUM SERPL-MCNC: 4 MMOL/L — SIGNIFICANT CHANGE UP (ref 3.5–5.3)
POTASSIUM SERPL-MCNC: 4.1 MMOL/L — SIGNIFICANT CHANGE UP (ref 3.5–5.3)
POTASSIUM SERPL-MCNC: 4.2 MMOL/L — SIGNIFICANT CHANGE UP (ref 3.5–5.3)
POTASSIUM SERPL-MCNC: 4.3 MMOL/L — SIGNIFICANT CHANGE UP (ref 3.5–5.3)
POTASSIUM SERPL-MCNC: 4.4 MMOL/L — SIGNIFICANT CHANGE UP (ref 3.5–5.3)
POTASSIUM SERPL-MCNC: 4.4 MMOL/L — SIGNIFICANT CHANGE UP (ref 3.5–5.3)
POTASSIUM SERPL-MCNC: 4.6 MMOL/L — SIGNIFICANT CHANGE UP (ref 3.5–5.3)
POTASSIUM SERPL-MCNC: 4.7 MMOL/L — SIGNIFICANT CHANGE UP (ref 3.5–5.3)
POTASSIUM SERPL-MCNC: 4.8 MMOL/L — SIGNIFICANT CHANGE UP (ref 3.5–5.3)
POTASSIUM SERPL-MCNC: 5 MMOL/L — SIGNIFICANT CHANGE UP (ref 3.5–5.3)
POTASSIUM SERPL-MCNC: 5 MMOL/L — SIGNIFICANT CHANGE UP (ref 3.5–5.3)
POTASSIUM SERPL-MCNC: 5.1 MMOL/L — SIGNIFICANT CHANGE UP (ref 3.5–5.3)
POTASSIUM SERPL-SCNC: 3.6 MMOL/L — SIGNIFICANT CHANGE UP (ref 3.5–5.3)
POTASSIUM SERPL-SCNC: 3.6 MMOL/L — SIGNIFICANT CHANGE UP (ref 3.5–5.3)
POTASSIUM SERPL-SCNC: 3.7 MMOL/L — SIGNIFICANT CHANGE UP (ref 3.5–5.3)
POTASSIUM SERPL-SCNC: 3.8 MMOL/L — SIGNIFICANT CHANGE UP (ref 3.5–5.3)
POTASSIUM SERPL-SCNC: 3.9 MMOL/L — SIGNIFICANT CHANGE UP (ref 3.5–5.3)
POTASSIUM SERPL-SCNC: 4 MMOL/L — SIGNIFICANT CHANGE UP (ref 3.5–5.3)
POTASSIUM SERPL-SCNC: 4.1 MMOL/L — SIGNIFICANT CHANGE UP (ref 3.5–5.3)
POTASSIUM SERPL-SCNC: 4.2 MMOL/L
POTASSIUM SERPL-SCNC: 4.2 MMOL/L — SIGNIFICANT CHANGE UP (ref 3.5–5.3)
POTASSIUM SERPL-SCNC: 4.3 MMOL/L
POTASSIUM SERPL-SCNC: 4.3 MMOL/L
POTASSIUM SERPL-SCNC: 4.3 MMOL/L — SIGNIFICANT CHANGE UP (ref 3.5–5.3)
POTASSIUM SERPL-SCNC: 4.4 MMOL/L — SIGNIFICANT CHANGE UP (ref 3.5–5.3)
POTASSIUM SERPL-SCNC: 4.4 MMOL/L — SIGNIFICANT CHANGE UP (ref 3.5–5.3)
POTASSIUM SERPL-SCNC: 4.5 MMOL/L
POTASSIUM SERPL-SCNC: 4.6 MMOL/L
POTASSIUM SERPL-SCNC: 4.6 MMOL/L — SIGNIFICANT CHANGE UP (ref 3.5–5.3)
POTASSIUM SERPL-SCNC: 4.7 MMOL/L
POTASSIUM SERPL-SCNC: 4.7 MMOL/L — SIGNIFICANT CHANGE UP (ref 3.5–5.3)
POTASSIUM SERPL-SCNC: 4.8 MMOL/L — SIGNIFICANT CHANGE UP (ref 3.5–5.3)
POTASSIUM SERPL-SCNC: 5 MMOL/L — SIGNIFICANT CHANGE UP (ref 3.5–5.3)
POTASSIUM SERPL-SCNC: 5 MMOL/L — SIGNIFICANT CHANGE UP (ref 3.5–5.3)
POTASSIUM SERPL-SCNC: 5.1 MMOL/L — SIGNIFICANT CHANGE UP (ref 3.5–5.3)
POTASSIUM UR-SCNC: 44.1 MMOL/L — SIGNIFICANT CHANGE UP
PROC NAME: NORMAL
PROC NAME: NORMAL
PROCALCITONIN SERPL-MCNC: 0.02 NG/ML — SIGNIFICANT CHANGE UP (ref 0.02–0.1)
PROCALCITONIN SERPL-MCNC: 1.23 NG/ML — HIGH (ref 0.02–0.1)
PROT SERPL-MCNC: 5 G/DL — LOW (ref 6–8.3)
PROT SERPL-MCNC: 5.1 G/DL — LOW (ref 6–8.3)
PROT SERPL-MCNC: 5.3 G/DL — LOW (ref 6–8.3)
PROT SERPL-MCNC: 5.5 G/DL — LOW (ref 6–8.3)
PROT SERPL-MCNC: 5.5 G/DL — LOW (ref 6–8.3)
PROT SERPL-MCNC: 5.7 G/DL — LOW (ref 6–8.3)
PROT SERPL-MCNC: 5.8 G/DL — LOW (ref 6–8.3)
PROT SERPL-MCNC: 5.8 G/DL — LOW (ref 6–8.3)
PROT SERPL-MCNC: 5.9 G/DL — LOW (ref 6–8.3)
PROT SERPL-MCNC: 6.1 G/DL — SIGNIFICANT CHANGE UP (ref 6–8.3)
PROT SERPL-MCNC: 6.2 G/DL — SIGNIFICANT CHANGE UP (ref 6–8.3)
PROT SERPL-MCNC: 6.4 G/DL — SIGNIFICANT CHANGE UP (ref 6–8.3)
PROT SERPL-MCNC: 6.5 G/DL — SIGNIFICANT CHANGE UP (ref 6–8.3)
PROT SERPL-MCNC: 6.7 G/DL
PROT SERPL-MCNC: 6.7 G/DL — SIGNIFICANT CHANGE UP (ref 6–8.3)
PROT SERPL-MCNC: 6.8 G/DL
PROT SERPL-MCNC: 6.8 G/DL
PROT SERPL-MCNC: 7 G/DL
PROT SERPL-MCNC: 7 G/DL — SIGNIFICANT CHANGE UP (ref 6–8.3)
PROT SERPL-MCNC: 7.2 G/DL
PROT SERPL-MCNC: 7.3 G/DL
PROT SERPL-MCNC: 7.9 G/DL — SIGNIFICANT CHANGE UP (ref 6–8.3)
PROT UR STRIP-MCNC: NORMAL
PROT UR-MCNC: ABNORMAL
PROT UR-MCNC: NEGATIVE — SIGNIFICANT CHANGE UP
PROT UR-MCNC: NEGATIVE — SIGNIFICANT CHANGE UP
PROTEIN URINE: NEGATIVE
PROTEIN URINE: NORMAL
PROTHROM AB SERPL-ACNC: 15.2 SEC — HIGH (ref 10.5–13.4)
PROTHROM AB SERPL-ACNC: 16.2 SEC — HIGH (ref 10.5–13.4)
PROTHROM AB SERPL-ACNC: 16.4 SEC — HIGH (ref 10.5–13.4)
PROTHROM AB SERPL-ACNC: 16.5 SEC — HIGH (ref 10.5–13.4)
PROTHROM AB SERPL-ACNC: 16.7 SEC — HIGH (ref 10.5–13.4)
PROTHROM AB SERPL-ACNC: 18 SEC — HIGH (ref 10.5–13.4)
PROTHROM AB SERPL-ACNC: 18.4 SEC — HIGH (ref 10.5–13.4)
PROTHROM AB SERPL-ACNC: 19.3 SEC — HIGH (ref 10.5–13.4)
PROTHROM AB SERPL-ACNC: 21.7 SEC — HIGH (ref 10.5–13.4)
PROTHROM AB SERPL-ACNC: 22 SEC — HIGH (ref 10.5–13.4)
PROTHROM AB SERPL-ACNC: 23.1 SEC — HIGH (ref 10.5–13.4)
PROTHROM AB SERPL-ACNC: 24 SEC — HIGH (ref 10.5–13.4)
PROTHROM AB SERPL-ACNC: 24.5 SEC — HIGH (ref 10.5–13.4)
PROTHROM AB SERPL-ACNC: 24.6 SEC — HIGH (ref 10.5–13.4)
PROTHROM AB SERPL-ACNC: 25.1 SEC — HIGH (ref 10.5–13.4)
PROTHROM AB SERPL-ACNC: 26 SEC — HIGH (ref 10.5–13.4)
PROTHROM AB SERPL-ACNC: 32.7 SEC — HIGH (ref 10.5–13.4)
PROTHROM AB SERPL-ACNC: 35.7 SEC — HIGH (ref 10.5–13.4)
PROTHROM AB SERPL-ACNC: 40.3 SEC — HIGH (ref 10.5–13.4)
PROTHROM AB SERPL-ACNC: 41.7 SEC — HIGH (ref 10.5–13.4)
PROTHROM AB SERPL-ACNC: 56.7 SEC — HIGH (ref 10.5–13.4)
PT BLD: 12.9 SEC
RAPID RVP RESULT: SIGNIFICANT CHANGE UP
RAPID RVP RESULT: SIGNIFICANT CHANGE UP
RBC # BLD: 2.28 M/UL — LOW (ref 4.2–5.8)
RBC # BLD: 2.42 M/UL — LOW (ref 4.2–5.8)
RBC # BLD: 2.5 M/UL — LOW (ref 4.2–5.8)
RBC # BLD: 2.51 M/UL — LOW (ref 4.2–5.8)
RBC # BLD: 2.55 M/UL — LOW (ref 4.2–5.8)
RBC # BLD: 2.55 M/UL — LOW (ref 4.2–5.8)
RBC # BLD: 2.56 M/UL — LOW (ref 4.2–5.8)
RBC # BLD: 2.58 M/UL — LOW (ref 4.2–5.8)
RBC # BLD: 2.58 M/UL — LOW (ref 4.2–5.8)
RBC # BLD: 2.61 M/UL — LOW (ref 4.2–5.8)
RBC # BLD: 2.61 M/UL — LOW (ref 4.2–5.8)
RBC # BLD: 2.63 M/UL — LOW (ref 4.2–5.8)
RBC # BLD: 2.65 M/UL — LOW (ref 4.2–5.8)
RBC # BLD: 2.65 M/UL — LOW (ref 4.2–5.8)
RBC # BLD: 2.66 M/UL — LOW (ref 4.2–5.8)
RBC # BLD: 2.66 M/UL — LOW (ref 4.2–5.8)
RBC # BLD: 2.68 M/UL — LOW (ref 4.2–5.8)
RBC # BLD: 2.68 M/UL — LOW (ref 4.2–5.8)
RBC # BLD: 2.69 M/UL — LOW (ref 4.2–5.8)
RBC # BLD: 2.69 M/UL — LOW (ref 4.2–5.8)
RBC # BLD: 2.7 M/UL — LOW (ref 4.2–5.8)
RBC # BLD: 2.72 M/UL — LOW (ref 4.2–5.8)
RBC # BLD: 2.74 M/UL — LOW (ref 4.2–5.8)
RBC # BLD: 2.75 M/UL — LOW (ref 4.2–5.8)
RBC # BLD: 2.77 M/UL — LOW (ref 4.2–5.8)
RBC # BLD: 2.78 M/UL — LOW (ref 4.2–5.8)
RBC # BLD: 2.82 M/UL — LOW (ref 4.2–5.8)
RBC # BLD: 2.88 M/UL — LOW (ref 4.2–5.8)
RBC # BLD: 2.88 M/UL — LOW (ref 4.2–5.8)
RBC # BLD: 2.89 M/UL — LOW (ref 4.2–5.8)
RBC # BLD: 2.9 M/UL — LOW (ref 4.2–5.8)
RBC # BLD: 2.92 M/UL — LOW (ref 4.2–5.8)
RBC # BLD: 2.93 M/UL — LOW (ref 4.2–5.8)
RBC # BLD: 2.93 M/UL — LOW (ref 4.2–5.8)
RBC # BLD: 2.94 M/UL — LOW (ref 4.2–5.8)
RBC # BLD: 2.95 M/UL — LOW (ref 4.2–5.8)
RBC # BLD: 2.98 M/UL — LOW (ref 4.2–5.8)
RBC # BLD: 2.99 M/UL
RBC # BLD: 2.99 M/UL — LOW (ref 4.2–5.8)
RBC # BLD: 2.99 M/UL — LOW (ref 4.2–5.8)
RBC # BLD: 3 M/UL — LOW (ref 4.2–5.8)
RBC # BLD: 3 M/UL — LOW (ref 4.2–5.8)
RBC # BLD: 3.02 M/UL — LOW (ref 4.2–5.8)
RBC # BLD: 3.02 M/UL — LOW (ref 4.2–5.8)
RBC # BLD: 3.04 M/UL — LOW (ref 4.2–5.8)
RBC # BLD: 3.04 M/UL — LOW (ref 4.2–5.8)
RBC # BLD: 3.07 M/UL
RBC # BLD: 3.09 M/UL — LOW (ref 4.2–5.8)
RBC # BLD: 3.1 M/UL — LOW (ref 4.2–5.8)
RBC # BLD: 3.1 M/UL — LOW (ref 4.2–5.8)
RBC # BLD: 3.2 M/UL — LOW (ref 4.2–5.8)
RBC # BLD: 3.21 M/UL
RBC # BLD: 3.21 M/UL — LOW (ref 4.2–5.8)
RBC # BLD: 3.25 M/UL
RBC # BLD: 3.26 M/UL — LOW (ref 4.2–5.8)
RBC # BLD: 3.34 M/UL
RBC # BLD: 3.36 M/UL — LOW (ref 4.2–5.8)
RBC # FLD: 16.5 % — HIGH (ref 10.3–14.5)
RBC # FLD: 16.8 %
RBC # FLD: 16.8 % — HIGH (ref 10.3–14.5)
RBC # FLD: 16.8 % — HIGH (ref 10.3–14.5)
RBC # FLD: 16.9 % — HIGH (ref 10.3–14.5)
RBC # FLD: 17.3 % — HIGH (ref 10.3–14.5)
RBC # FLD: 17.4 % — HIGH (ref 10.3–14.5)
RBC # FLD: 17.5 % — HIGH (ref 10.3–14.5)
RBC # FLD: 17.6 %
RBC # FLD: 17.6 %
RBC # FLD: 17.8 % — HIGH (ref 10.3–14.5)
RBC # FLD: 17.9 % — HIGH (ref 10.3–14.5)
RBC # FLD: 18 % — HIGH (ref 10.3–14.5)
RBC # FLD: 18.1 %
RBC # FLD: 18.1 % — HIGH (ref 10.3–14.5)
RBC # FLD: 18.2 % — HIGH (ref 10.3–14.5)
RBC # FLD: 18.3 % — HIGH (ref 10.3–14.5)
RBC # FLD: 18.4 % — HIGH (ref 10.3–14.5)
RBC # FLD: 18.5 % — HIGH (ref 10.3–14.5)
RBC # FLD: 18.5 % — HIGH (ref 10.3–14.5)
RBC # FLD: 18.6 % — HIGH (ref 10.3–14.5)
RBC # FLD: 18.7 %
RBC # FLD: 18.7 % — HIGH (ref 10.3–14.5)
RBC # FLD: 18.8 % — HIGH (ref 10.3–14.5)
RBC # FLD: 18.8 % — HIGH (ref 10.3–14.5)
RBC # FLD: 18.9 % — HIGH (ref 10.3–14.5)
RBC # FLD: 19 % — HIGH (ref 10.3–14.5)
RBC # FLD: 19.1 % — HIGH (ref 10.3–14.5)
RBC # FLD: 19.1 % — HIGH (ref 10.3–14.5)
RBC # FLD: 19.2 % — HIGH (ref 10.3–14.5)
RBC # FLD: 19.3 % — HIGH (ref 10.3–14.5)
RBC # FLD: 19.4 % — HIGH (ref 10.3–14.5)
RBC # FLD: 19.4 % — HIGH (ref 10.3–14.5)
RBC # FLD: 19.6 % — HIGH (ref 10.3–14.5)
RBC # FLD: 19.6 % — HIGH (ref 10.3–14.5)
RBC # FLD: 20 % — HIGH (ref 10.3–14.5)
RBC # FLD: 20.3 % — HIGH (ref 10.3–14.5)
RBC # FLD: 20.5 % — HIGH (ref 10.3–14.5)
RBC # FLD: 20.6 % — HIGH (ref 10.3–14.5)
RBC # FLD: 20.6 % — HIGH (ref 10.3–14.5)
RBC # FLD: 20.8 % — HIGH (ref 10.3–14.5)
RBC # FLD: 20.9 % — HIGH (ref 10.3–14.5)
RBC # FLD: 20.9 % — HIGH (ref 10.3–14.5)
RBC # FLD: 21.5 % — HIGH (ref 10.3–14.5)
RBC # FLD: 21.8 % — HIGH (ref 10.3–14.5)
RBC # FLD: 21.8 % — HIGH (ref 10.3–14.5)
RBC # FLD: 21.9 % — HIGH (ref 10.3–14.5)
RBC BLD AUTO: ABNORMAL
RBC BLD AUTO: SIGNIFICANT CHANGE UP
RBC BLD AUTO: SIGNIFICANT CHANGE UP
RBC CASTS # UR COMP ASSIST: 3 /HPF — SIGNIFICANT CHANGE UP (ref 0–4)
RBC CASTS # UR COMP ASSIST: 4 /HPF — SIGNIFICANT CHANGE UP (ref 0–4)
RBC CASTS # UR COMP ASSIST: 41 /HPF — HIGH (ref 0–4)
RBC CASTS # UR COMP ASSIST: NEGATIVE /HPF — SIGNIFICANT CHANGE UP (ref 0–4)
RETICS #: 73.2 K/UL — SIGNIFICANT CHANGE UP (ref 25–125)
RETICS #: 73.4 K/UL — SIGNIFICANT CHANGE UP (ref 25–125)
RETICS #: 73.7 K/UL — SIGNIFICANT CHANGE UP (ref 25–125)
RETICS #: 91.8 K/UL — SIGNIFICANT CHANGE UP (ref 25–125)
RETICS/RBC NFR: 2.4 % — SIGNIFICANT CHANGE UP (ref 0.5–2.5)
RETICS/RBC NFR: 2.5 % — SIGNIFICANT CHANGE UP (ref 0.5–2.5)
RETICS/RBC NFR: 2.7 % — HIGH (ref 0.5–2.5)
RETICS/RBC NFR: 3 % — HIGH (ref 0.5–2.5)
RH IG SCN BLD-IMP: POSITIVE — SIGNIFICANT CHANGE UP
RSV RNA NPH QL NAA+NON-PROBE: SIGNIFICANT CHANGE UP
RSV RNA SPEC QL NAA+PROBE: SIGNIFICANT CHANGE UP
RV+EV RNA SPEC QL NAA+PROBE: SIGNIFICANT CHANGE UP
S AUREUS DNA NOSE QL NAA+PROBE: DETECTED
S AUREUS DNA NOSE QL NAA+PROBE: SIGNIFICANT CHANGE UP
SAO2 % BLDA: 87.7 % — LOW (ref 94–98)
SAO2 % BLDA: 88.8 % — LOW (ref 94–98)
SAO2 % BLDV: 24.4 % — LOW (ref 67–88)
SAO2 % BLDV: 39.7 % — LOW (ref 67–88)
SAO2 % BLDV: 44.1 % — LOW (ref 67–88)
SAO2 % BLDV: 47.3 % — SIGNIFICANT CHANGE UP
SAO2 % BLDV: 64.6 % — LOW (ref 67–88)
SAO2 % BLDV: 71.3 % — SIGNIFICANT CHANGE UP (ref 67–88)
SARS-COV-2 RNA SPEC QL NAA+PROBE: DETECTED
SARS-COV-2 RNA SPEC QL NAA+PROBE: SIGNIFICANT CHANGE UP
SCHISTOCYTES BLD QL AUTO: SLIGHT — SIGNIFICANT CHANGE UP
SMUDGE CELLS # BLD: PRESENT — SIGNIFICANT CHANGE UP
SMUDGE CELLS # BLD: PRESENT — SIGNIFICANT CHANGE UP
SODIUM SERPL-SCNC: 130 MMOL/L — LOW (ref 135–145)
SODIUM SERPL-SCNC: 131 MMOL/L — LOW (ref 135–145)
SODIUM SERPL-SCNC: 132 MMOL/L — LOW (ref 135–145)
SODIUM SERPL-SCNC: 132 MMOL/L — LOW (ref 135–145)
SODIUM SERPL-SCNC: 133 MMOL/L
SODIUM SERPL-SCNC: 133 MMOL/L — LOW (ref 135–145)
SODIUM SERPL-SCNC: 134 MMOL/L — LOW (ref 135–145)
SODIUM SERPL-SCNC: 135 MMOL/L
SODIUM SERPL-SCNC: 135 MMOL/L — SIGNIFICANT CHANGE UP (ref 135–145)
SODIUM SERPL-SCNC: 136 MMOL/L
SODIUM SERPL-SCNC: 136 MMOL/L — SIGNIFICANT CHANGE UP (ref 135–145)
SODIUM SERPL-SCNC: 137 MMOL/L — SIGNIFICANT CHANGE UP (ref 135–145)
SODIUM SERPL-SCNC: 137 MMOL/L — SIGNIFICANT CHANGE UP (ref 135–145)
SODIUM SERPL-SCNC: 138 MMOL/L — SIGNIFICANT CHANGE UP (ref 135–145)
SODIUM SERPL-SCNC: 139 MMOL/L — SIGNIFICANT CHANGE UP (ref 135–145)
SODIUM SERPL-SCNC: 140 MMOL/L
SODIUM SERPL-SCNC: 140 MMOL/L — SIGNIFICANT CHANGE UP (ref 135–145)
SODIUM SERPL-SCNC: 141 MMOL/L — SIGNIFICANT CHANGE UP (ref 135–145)
SODIUM UR-SCNC: <20 MMOL/L — SIGNIFICANT CHANGE UP
SP GR SPEC: 1 — LOW (ref 1.01–1.02)
SP GR SPEC: 1.01 — SIGNIFICANT CHANGE UP (ref 1.01–1.02)
SP GR SPEC: 1.03 — HIGH (ref 1.01–1.02)
SP GR SPEC: 1.03 — HIGH (ref 1.01–1.02)
SP GR SPEC: 1.03 — SIGNIFICANT CHANGE UP (ref 1.01–1.05)
SP GR SPEC: 1.05 — SIGNIFICANT CHANGE UP (ref 1.01–1.05)
SP GR UR STRIP: >=1.03
SPECIFIC GRAVITY URINE: 1.01
SPECIFIC GRAVITY URINE: 1.02
SPECIMEN SOURCE: SIGNIFICANT CHANGE UP
SPHEROCYTES BLD QL SMEAR: SLIGHT — SIGNIFICANT CHANGE UP
SURGICAL PATHOLOGY STUDY: SIGNIFICANT CHANGE UP
TARGETS BLD QL SMEAR: SLIGHT — SIGNIFICANT CHANGE UP
TIBC SERPL-MCNC: 215 UG/DL — LOW (ref 220–430)
TRIGL SERPL-MCNC: 125 MG/DL
TRIGL SERPL-MCNC: 56 MG/DL
TRIGL SERPL-MCNC: 58 MG/DL
TRIGL SERPL-MCNC: 59 MG/DL
TRIGL SERPL-MCNC: 88 MG/DL — SIGNIFICANT CHANGE UP
TROPONIN T, HIGH SENSITIVITY RESULT: 33 NG/L — SIGNIFICANT CHANGE UP (ref 0–51)
TROPONIN T, HIGH SENSITIVITY RESULT: 37 NG/L — SIGNIFICANT CHANGE UP (ref 0–51)
TSH SERPL-ACNC: 2.72 UIU/ML
TSH SERPL-ACNC: 3.22 UIU/ML
TSH SERPL-ACNC: 4.39 UIU/ML
TSH SERPL-ACNC: 5.36 UIU/ML
UIBC SERPL-MCNC: 198 UG/DL — SIGNIFICANT CHANGE UP (ref 110–370)
UROBILINOGEN FLD QL: ABNORMAL
UROBILINOGEN FLD QL: NEGATIVE — SIGNIFICANT CHANGE UP
UROBILINOGEN FLD QL: SIGNIFICANT CHANGE UP
UROBILINOGEN URINE: ABNORMAL
UROBILINOGEN URINE: NORMAL
VARIANT LYMPHS # BLD: 1.8 % — SIGNIFICANT CHANGE UP (ref 0–6)
VARIANT LYMPHS # BLD: 4.3 % — SIGNIFICANT CHANGE UP (ref 0–6)
VIT B12 SERPL-MCNC: 1178 PG/ML
VIT B12 SERPL-MCNC: 1522 PG/ML
VIT B12 SERPL-MCNC: 1748 PG/ML
WBC # BLD: 15.21 K/UL — HIGH (ref 3.8–10.5)
WBC # BLD: 2.13 K/UL — LOW (ref 3.8–10.5)
WBC # BLD: 2.78 K/UL — LOW (ref 3.8–10.5)
WBC # BLD: 2.86 K/UL — LOW (ref 3.8–10.5)
WBC # BLD: 2.93 K/UL — LOW (ref 3.8–10.5)
WBC # BLD: 3.01 K/UL — LOW (ref 3.8–10.5)
WBC # BLD: 3.07 K/UL — LOW (ref 3.8–10.5)
WBC # BLD: 3.09 K/UL — LOW (ref 3.8–10.5)
WBC # BLD: 3.12 K/UL — LOW (ref 3.8–10.5)
WBC # BLD: 3.13 K/UL — LOW (ref 3.8–10.5)
WBC # BLD: 3.13 K/UL — LOW (ref 3.8–10.5)
WBC # BLD: 3.19 K/UL — LOW (ref 3.8–10.5)
WBC # BLD: 3.2 K/UL — LOW (ref 3.8–10.5)
WBC # BLD: 3.33 K/UL — LOW (ref 3.8–10.5)
WBC # BLD: 3.34 K/UL — LOW (ref 3.8–10.5)
WBC # BLD: 3.43 K/UL — LOW (ref 3.8–10.5)
WBC # BLD: 3.45 K/UL — LOW (ref 3.8–10.5)
WBC # BLD: 3.45 K/UL — LOW (ref 3.8–10.5)
WBC # BLD: 3.46 K/UL — LOW (ref 3.8–10.5)
WBC # BLD: 3.46 K/UL — LOW (ref 3.8–10.5)
WBC # BLD: 3.48 K/UL — LOW (ref 3.8–10.5)
WBC # BLD: 3.49 K/UL — LOW (ref 3.8–10.5)
WBC # BLD: 3.68 K/UL — LOW (ref 3.8–10.5)
WBC # BLD: 3.68 K/UL — LOW (ref 3.8–10.5)
WBC # BLD: 3.7 K/UL — LOW (ref 3.8–10.5)
WBC # BLD: 3.74 K/UL — LOW (ref 3.8–10.5)
WBC # BLD: 3.76 K/UL — LOW (ref 3.8–10.5)
WBC # BLD: 3.82 K/UL — SIGNIFICANT CHANGE UP (ref 3.8–10.5)
WBC # BLD: 3.95 K/UL — SIGNIFICANT CHANGE UP (ref 3.8–10.5)
WBC # BLD: 3.99 K/UL — SIGNIFICANT CHANGE UP (ref 3.8–10.5)
WBC # BLD: 4.05 K/UL — SIGNIFICANT CHANGE UP (ref 3.8–10.5)
WBC # BLD: 4.05 K/UL — SIGNIFICANT CHANGE UP (ref 3.8–10.5)
WBC # BLD: 4.06 K/UL — SIGNIFICANT CHANGE UP (ref 3.8–10.5)
WBC # BLD: 4.19 K/UL — SIGNIFICANT CHANGE UP (ref 3.8–10.5)
WBC # BLD: 4.26 K/UL — SIGNIFICANT CHANGE UP (ref 3.8–10.5)
WBC # BLD: 4.26 K/UL — SIGNIFICANT CHANGE UP (ref 3.8–10.5)
WBC # BLD: 4.28 K/UL — SIGNIFICANT CHANGE UP (ref 3.8–10.5)
WBC # BLD: 4.31 K/UL — SIGNIFICANT CHANGE UP (ref 3.8–10.5)
WBC # BLD: 4.35 K/UL — SIGNIFICANT CHANGE UP (ref 3.8–10.5)
WBC # BLD: 4.4 K/UL — SIGNIFICANT CHANGE UP (ref 3.8–10.5)
WBC # BLD: 4.46 K/UL — SIGNIFICANT CHANGE UP (ref 3.8–10.5)
WBC # BLD: 4.63 K/UL — SIGNIFICANT CHANGE UP (ref 3.8–10.5)
WBC # BLD: 4.66 K/UL — SIGNIFICANT CHANGE UP (ref 3.8–10.5)
WBC # BLD: 4.84 K/UL — SIGNIFICANT CHANGE UP (ref 3.8–10.5)
WBC # BLD: 4.98 K/UL — SIGNIFICANT CHANGE UP (ref 3.8–10.5)
WBC # BLD: 4.99 K/UL — SIGNIFICANT CHANGE UP (ref 3.8–10.5)
WBC # BLD: 5.01 K/UL — SIGNIFICANT CHANGE UP (ref 3.8–10.5)
WBC # BLD: 5.13 K/UL — SIGNIFICANT CHANGE UP (ref 3.8–10.5)
WBC # BLD: 5.23 K/UL — SIGNIFICANT CHANGE UP (ref 3.8–10.5)
WBC # BLD: 5.85 K/UL — SIGNIFICANT CHANGE UP (ref 3.8–10.5)
WBC # BLD: 6.09 K/UL — SIGNIFICANT CHANGE UP (ref 3.8–10.5)
WBC # BLD: 6.4 K/UL — SIGNIFICANT CHANGE UP (ref 3.8–10.5)
WBC # BLD: 7.21 K/UL — SIGNIFICANT CHANGE UP (ref 3.8–10.5)
WBC # BLD: 7.95 K/UL — SIGNIFICANT CHANGE UP (ref 3.8–10.5)
WBC # BLD: 8.37 K/UL — SIGNIFICANT CHANGE UP (ref 3.8–10.5)
WBC # BLD: 8.63 K/UL — SIGNIFICANT CHANGE UP (ref 3.8–10.5)
WBC # BLD: 8.98 K/UL — SIGNIFICANT CHANGE UP (ref 3.8–10.5)
WBC # BLD: 9.33 K/UL — SIGNIFICANT CHANGE UP (ref 3.8–10.5)
WBC # FLD AUTO: 15.21 K/UL — HIGH (ref 3.8–10.5)
WBC # FLD AUTO: 2.13 K/UL — LOW (ref 3.8–10.5)
WBC # FLD AUTO: 2.78 K/UL — LOW (ref 3.8–10.5)
WBC # FLD AUTO: 2.86 K/UL — LOW (ref 3.8–10.5)
WBC # FLD AUTO: 2.93 K/UL — LOW (ref 3.8–10.5)
WBC # FLD AUTO: 3.01 K/UL — LOW (ref 3.8–10.5)
WBC # FLD AUTO: 3.07 K/UL — LOW (ref 3.8–10.5)
WBC # FLD AUTO: 3.09 K/UL — LOW (ref 3.8–10.5)
WBC # FLD AUTO: 3.12 K/UL — LOW (ref 3.8–10.5)
WBC # FLD AUTO: 3.13 K/UL — LOW (ref 3.8–10.5)
WBC # FLD AUTO: 3.13 K/UL — LOW (ref 3.8–10.5)
WBC # FLD AUTO: 3.19 K/UL — LOW (ref 3.8–10.5)
WBC # FLD AUTO: 3.2 K/UL — LOW (ref 3.8–10.5)
WBC # FLD AUTO: 3.33 K/UL — LOW (ref 3.8–10.5)
WBC # FLD AUTO: 3.34 K/UL — LOW (ref 3.8–10.5)
WBC # FLD AUTO: 3.43 K/UL
WBC # FLD AUTO: 3.43 K/UL — LOW (ref 3.8–10.5)
WBC # FLD AUTO: 3.45 K/UL — LOW (ref 3.8–10.5)
WBC # FLD AUTO: 3.45 K/UL — LOW (ref 3.8–10.5)
WBC # FLD AUTO: 3.46 K/UL — LOW (ref 3.8–10.5)
WBC # FLD AUTO: 3.46 K/UL — LOW (ref 3.8–10.5)
WBC # FLD AUTO: 3.48 K/UL — LOW (ref 3.8–10.5)
WBC # FLD AUTO: 3.49 K/UL — LOW (ref 3.8–10.5)
WBC # FLD AUTO: 3.68 K/UL — LOW (ref 3.8–10.5)
WBC # FLD AUTO: 3.68 K/UL — LOW (ref 3.8–10.5)
WBC # FLD AUTO: 3.7 K/UL — LOW (ref 3.8–10.5)
WBC # FLD AUTO: 3.74 K/UL — LOW (ref 3.8–10.5)
WBC # FLD AUTO: 3.76 K/UL — LOW (ref 3.8–10.5)
WBC # FLD AUTO: 3.78 K/UL
WBC # FLD AUTO: 3.81 K/UL
WBC # FLD AUTO: 3.82 K/UL — SIGNIFICANT CHANGE UP (ref 3.8–10.5)
WBC # FLD AUTO: 3.87 K/UL
WBC # FLD AUTO: 3.95 K/UL — SIGNIFICANT CHANGE UP (ref 3.8–10.5)
WBC # FLD AUTO: 3.99 K/UL — SIGNIFICANT CHANGE UP (ref 3.8–10.5)
WBC # FLD AUTO: 4.05 K/UL — SIGNIFICANT CHANGE UP (ref 3.8–10.5)
WBC # FLD AUTO: 4.05 K/UL — SIGNIFICANT CHANGE UP (ref 3.8–10.5)
WBC # FLD AUTO: 4.06 K/UL — SIGNIFICANT CHANGE UP (ref 3.8–10.5)
WBC # FLD AUTO: 4.19 K/UL — SIGNIFICANT CHANGE UP (ref 3.8–10.5)
WBC # FLD AUTO: 4.26 K/UL — SIGNIFICANT CHANGE UP (ref 3.8–10.5)
WBC # FLD AUTO: 4.26 K/UL — SIGNIFICANT CHANGE UP (ref 3.8–10.5)
WBC # FLD AUTO: 4.28 K/UL — SIGNIFICANT CHANGE UP (ref 3.8–10.5)
WBC # FLD AUTO: 4.31 K/UL — SIGNIFICANT CHANGE UP (ref 3.8–10.5)
WBC # FLD AUTO: 4.35 K/UL — SIGNIFICANT CHANGE UP (ref 3.8–10.5)
WBC # FLD AUTO: 4.4 K/UL — SIGNIFICANT CHANGE UP (ref 3.8–10.5)
WBC # FLD AUTO: 4.46 K/UL — SIGNIFICANT CHANGE UP (ref 3.8–10.5)
WBC # FLD AUTO: 4.56 K/UL
WBC # FLD AUTO: 4.63 K/UL — SIGNIFICANT CHANGE UP (ref 3.8–10.5)
WBC # FLD AUTO: 4.66 K/UL — SIGNIFICANT CHANGE UP (ref 3.8–10.5)
WBC # FLD AUTO: 4.84 K/UL — SIGNIFICANT CHANGE UP (ref 3.8–10.5)
WBC # FLD AUTO: 4.98 K/UL — SIGNIFICANT CHANGE UP (ref 3.8–10.5)
WBC # FLD AUTO: 4.99 K/UL — SIGNIFICANT CHANGE UP (ref 3.8–10.5)
WBC # FLD AUTO: 5.01 K/UL — SIGNIFICANT CHANGE UP (ref 3.8–10.5)
WBC # FLD AUTO: 5.13 K/UL — SIGNIFICANT CHANGE UP (ref 3.8–10.5)
WBC # FLD AUTO: 5.23 K/UL — SIGNIFICANT CHANGE UP (ref 3.8–10.5)
WBC # FLD AUTO: 5.85 K/UL — SIGNIFICANT CHANGE UP (ref 3.8–10.5)
WBC # FLD AUTO: 6.09 K/UL — SIGNIFICANT CHANGE UP (ref 3.8–10.5)
WBC # FLD AUTO: 6.4 K/UL — SIGNIFICANT CHANGE UP (ref 3.8–10.5)
WBC # FLD AUTO: 7.21 K/UL — SIGNIFICANT CHANGE UP (ref 3.8–10.5)
WBC # FLD AUTO: 7.95 K/UL — SIGNIFICANT CHANGE UP (ref 3.8–10.5)
WBC # FLD AUTO: 8.37 K/UL — SIGNIFICANT CHANGE UP (ref 3.8–10.5)
WBC # FLD AUTO: 8.63 K/UL — SIGNIFICANT CHANGE UP (ref 3.8–10.5)
WBC # FLD AUTO: 8.98 K/UL — SIGNIFICANT CHANGE UP (ref 3.8–10.5)
WBC # FLD AUTO: 9.33 K/UL — SIGNIFICANT CHANGE UP (ref 3.8–10.5)
WBC UR QL: 1 /HPF — SIGNIFICANT CHANGE UP (ref 0–5)
WBC UR QL: 25 /HPF — HIGH (ref 0–5)
WBC UR QL: 5 /HPF — SIGNIFICANT CHANGE UP (ref 0–5)
WBC UR QL: SIGNIFICANT CHANGE UP

## 2022-01-01 PROCEDURE — G0277: CPT

## 2022-01-01 PROCEDURE — 99183 HYPERBARIC OXYGEN THERAPY: CPT

## 2022-01-01 PROCEDURE — 36415 COLL VENOUS BLD VENIPUNCTURE: CPT

## 2022-01-01 PROCEDURE — 97530 THERAPEUTIC ACTIVITIES: CPT

## 2022-01-01 PROCEDURE — 84295 ASSAY OF SERUM SODIUM: CPT

## 2022-01-01 PROCEDURE — 85027 COMPLETE CBC AUTOMATED: CPT

## 2022-01-01 PROCEDURE — 99285 EMERGENCY DEPT VISIT HI MDM: CPT | Mod: 25

## 2022-01-01 PROCEDURE — 99233 SBSQ HOSP IP/OBS HIGH 50: CPT

## 2022-01-01 PROCEDURE — U0003: CPT

## 2022-01-01 PROCEDURE — 82962 GLUCOSE BLOOD TEST: CPT

## 2022-01-01 PROCEDURE — 99232 SBSQ HOSP IP/OBS MODERATE 35: CPT

## 2022-01-01 PROCEDURE — U0005: CPT

## 2022-01-01 PROCEDURE — 86900 BLOOD TYPING SEROLOGIC ABO: CPT

## 2022-01-01 PROCEDURE — 99215 OFFICE O/P EST HI 40 MIN: CPT

## 2022-01-01 PROCEDURE — 99215 OFFICE O/P EST HI 40 MIN: CPT | Mod: 25

## 2022-01-01 PROCEDURE — 36556 INSERT NON-TUNNEL CV CATH: CPT | Mod: GC

## 2022-01-01 PROCEDURE — 99213 OFFICE O/P EST LOW 20 MIN: CPT

## 2022-01-01 PROCEDURE — 99221 1ST HOSP IP/OBS SF/LOW 40: CPT

## 2022-01-01 PROCEDURE — 93296 REM INTERROG EVL PM/IDS: CPT

## 2022-01-01 PROCEDURE — 93010 ELECTROCARDIOGRAM REPORT: CPT

## 2022-01-01 PROCEDURE — 99232 SBSQ HOSP IP/OBS MODERATE 35: CPT | Mod: GC

## 2022-01-01 PROCEDURE — 99231 SBSQ HOSP IP/OBS SF/LOW 25: CPT

## 2022-01-01 PROCEDURE — 93308 TTE F-UP OR LMTD: CPT | Mod: 26,GC

## 2022-01-01 PROCEDURE — 80053 COMPREHEN METABOLIC PANEL: CPT

## 2022-01-01 PROCEDURE — 73630 X-RAY EXAM OF FOOT: CPT

## 2022-01-01 PROCEDURE — 93325 DOPPLER ECHO COLOR FLOW MAPG: CPT | Mod: 26

## 2022-01-01 PROCEDURE — 80048 BASIC METABOLIC PNL TOTAL CA: CPT

## 2022-01-01 PROCEDURE — 88305 TISSUE EXAM BY PATHOLOGIST: CPT | Mod: 26

## 2022-01-01 PROCEDURE — 99284 EMERGENCY DEPT VISIT MOD MDM: CPT | Mod: GC

## 2022-01-01 PROCEDURE — 88305 TISSUE EXAM BY PATHOLOGIST: CPT

## 2022-01-01 PROCEDURE — 99222 1ST HOSP IP/OBS MODERATE 55: CPT

## 2022-01-01 PROCEDURE — 70450 CT HEAD/BRAIN W/O DYE: CPT | Mod: 26,MA

## 2022-01-01 PROCEDURE — 87186 SC STD MICRODIL/AGAR DIL: CPT

## 2022-01-01 PROCEDURE — 93295 DEV INTERROG REMOTE 1/2/MLT: CPT

## 2022-01-01 PROCEDURE — G0463: CPT

## 2022-01-01 PROCEDURE — 82330 ASSAY OF CALCIUM: CPT

## 2022-01-01 PROCEDURE — 71045 X-RAY EXAM CHEST 1 VIEW: CPT | Mod: 26

## 2022-01-01 PROCEDURE — 99024 POSTOP FOLLOW-UP VISIT: CPT

## 2022-01-01 PROCEDURE — 93306 TTE W/DOPPLER COMPLETE: CPT | Mod: 26

## 2022-01-01 PROCEDURE — 99214 OFFICE O/P EST MOD 30 MIN: CPT

## 2022-01-01 PROCEDURE — G0444 DEPRESSION SCREEN ANNUAL: CPT | Mod: 59

## 2022-01-01 PROCEDURE — 93970 EXTREMITY STUDY: CPT | Mod: 26

## 2022-01-01 PROCEDURE — 99239 HOSP IP/OBS DSCHRG MGMT >30: CPT

## 2022-01-01 PROCEDURE — 82435 ASSAY OF BLOOD CHLORIDE: CPT

## 2022-01-01 PROCEDURE — 15100 SPLT AGRFT T/A/L 1ST 100SQCM: CPT | Mod: 78

## 2022-01-01 PROCEDURE — 15101 SPLT AGRFT T/A/L EA ADDL 100: CPT

## 2022-01-01 PROCEDURE — 77300 RADIATION THERAPY DOSE PLAN: CPT | Mod: 26

## 2022-01-01 PROCEDURE — 73630 X-RAY EXAM OF FOOT: CPT | Mod: 26,LT

## 2022-01-01 PROCEDURE — 99442: CPT | Mod: 95

## 2022-01-01 PROCEDURE — 96365 THER/PROPH/DIAG IV INF INIT: CPT

## 2022-01-01 PROCEDURE — 85610 PROTHROMBIN TIME: CPT

## 2022-01-01 PROCEDURE — 93000 ELECTROCARDIOGRAM COMPLETE: CPT

## 2022-01-01 PROCEDURE — 74177 CT ABD & PELVIS W/CONTRAST: CPT | Mod: 26

## 2022-01-01 PROCEDURE — 77295 3-D RADIOTHERAPY PLAN: CPT | Mod: 26

## 2022-01-01 PROCEDURE — 86850 RBC ANTIBODY SCREEN: CPT

## 2022-01-01 PROCEDURE — 81001 URINALYSIS AUTO W/SCOPE: CPT

## 2022-01-01 PROCEDURE — 83605 ASSAY OF LACTIC ACID: CPT

## 2022-01-01 PROCEDURE — 93923 UPR/LXTR ART STDY 3+ LVLS: CPT

## 2022-01-01 PROCEDURE — 86901 BLOOD TYPING SEROLOGIC RH(D): CPT

## 2022-01-01 PROCEDURE — 78800 RP LOCLZJ TUM 1 AREA 1 D IMG: CPT | Mod: 26

## 2022-01-01 PROCEDURE — 78800 RP LOCLZJ TUM 1 AREA 1 D IMG: CPT | Mod: MD

## 2022-01-01 PROCEDURE — 99223 1ST HOSP IP/OBS HIGH 75: CPT

## 2022-01-01 PROCEDURE — 71275 CT ANGIOGRAPHY CHEST: CPT | Mod: 26,MA

## 2022-01-01 PROCEDURE — 99291 CRITICAL CARE FIRST HOUR: CPT

## 2022-01-01 PROCEDURE — 99285 EMERGENCY DEPT VISIT HI MDM: CPT

## 2022-01-01 PROCEDURE — 73620 X-RAY EXAM OF FOOT: CPT | Mod: 26,50

## 2022-01-01 PROCEDURE — 99223 1ST HOSP IP/OBS HIGH 75: CPT | Mod: GC

## 2022-01-01 PROCEDURE — 99285 EMERGENCY DEPT VISIT HI MDM: CPT | Mod: FS

## 2022-01-01 PROCEDURE — 84100 ASSAY OF PHOSPHORUS: CPT

## 2022-01-01 PROCEDURE — 93261 INTERROGATE SUBQ DEFIB: CPT

## 2022-01-01 PROCEDURE — 85730 THROMBOPLASTIN TIME PARTIAL: CPT

## 2022-01-01 PROCEDURE — 99214 OFFICE O/P EST MOD 30 MIN: CPT | Mod: 25

## 2022-01-01 PROCEDURE — 82565 ASSAY OF CREATININE: CPT

## 2022-01-01 PROCEDURE — 96374 THER/PROPH/DIAG INJ IV PUSH: CPT | Mod: XU

## 2022-01-01 PROCEDURE — 99233 SBSQ HOSP IP/OBS HIGH 50: CPT | Mod: GC

## 2022-01-01 PROCEDURE — 83036 HEMOGLOBIN GLYCOSYLATED A1C: CPT

## 2022-01-01 PROCEDURE — 87040 BLOOD CULTURE FOR BACTERIA: CPT

## 2022-01-01 PROCEDURE — 93320 DOPPLER ECHO COMPLETE: CPT | Mod: 26

## 2022-01-01 PROCEDURE — 85025 COMPLETE CBC W/AUTO DIFF WBC: CPT

## 2022-01-01 PROCEDURE — 99291 CRITICAL CARE FIRST HOUR: CPT | Mod: 25

## 2022-01-01 PROCEDURE — 87075 CULTR BACTERIA EXCEPT BLOOD: CPT

## 2022-01-01 PROCEDURE — 74176 CT ABD & PELVIS W/O CONTRAST: CPT | Mod: 26

## 2022-01-01 PROCEDURE — 71260 CT THORAX DX C+: CPT | Mod: 26

## 2022-01-01 PROCEDURE — 99203 OFFICE O/P NEW LOW 30 MIN: CPT

## 2022-01-01 PROCEDURE — 82803 BLOOD GASES ANY COMBINATION: CPT

## 2022-01-01 PROCEDURE — 93005 ELECTROCARDIOGRAM TRACING: CPT

## 2022-01-01 PROCEDURE — 87070 CULTURE OTHR SPECIMN AEROBIC: CPT

## 2022-01-01 PROCEDURE — 99214 OFFICE O/P EST MOD 30 MIN: CPT | Mod: 95

## 2022-01-01 PROCEDURE — 81003 URINALYSIS AUTO W/O SCOPE: CPT | Mod: QW

## 2022-01-01 PROCEDURE — 99291 CRITICAL CARE FIRST HOUR: CPT | Mod: CS

## 2022-01-01 PROCEDURE — 83735 ASSAY OF MAGNESIUM: CPT

## 2022-01-01 PROCEDURE — 85652 RBC SED RATE AUTOMATED: CPT

## 2022-01-01 PROCEDURE — 71260 CT THORAX DX C+: CPT | Mod: MG

## 2022-01-01 PROCEDURE — 87077 CULTURE AEROBIC IDENTIFY: CPT

## 2022-01-01 PROCEDURE — 74177 CT ABD & PELVIS W/CONTRAST: CPT | Mod: MA

## 2022-01-01 PROCEDURE — 99222 1ST HOSP IP/OBS MODERATE 55: CPT | Mod: GC

## 2022-01-01 PROCEDURE — 81003 URINALYSIS AUTO W/O SCOPE: CPT

## 2022-01-01 PROCEDURE — 74177 CT ABD & PELVIS W/CONTRAST: CPT | Mod: MG

## 2022-01-01 PROCEDURE — 93260 PRGRMG DEV EVAL IMPLTBL SYS: CPT

## 2022-01-01 PROCEDURE — 15100 SPLT AGRFT T/A/L 1ST 100SQCM: CPT

## 2022-01-01 PROCEDURE — 97116 GAIT TRAINING THERAPY: CPT

## 2022-01-01 PROCEDURE — 93287 PERI-PX DEVICE EVAL & PRGR: CPT | Mod: 26

## 2022-01-01 PROCEDURE — 93000 ELECTROCARDIOGRAM COMPLETE: CPT | Mod: 59

## 2022-01-01 PROCEDURE — G1004: CPT

## 2022-01-01 PROCEDURE — 99233 SBSQ HOSP IP/OBS HIGH 50: CPT | Mod: FS

## 2022-01-01 PROCEDURE — 97162 PT EVAL MOD COMPLEX 30 MIN: CPT

## 2022-01-01 PROCEDURE — 71045 X-RAY EXAM CHEST 1 VIEW: CPT

## 2022-01-01 PROCEDURE — 85018 HEMOGLOBIN: CPT

## 2022-01-01 PROCEDURE — 77293 RESPIRATOR MOTION MGMT SIMUL: CPT | Mod: 26

## 2022-01-01 PROCEDURE — 43242 EGD US FINE NEEDLE BX/ASPIR: CPT | Mod: GC

## 2022-01-01 PROCEDURE — ZZZZZ: CPT

## 2022-01-01 PROCEDURE — 99232 SBSQ HOSP IP/OBS MODERATE 35: CPT | Mod: FS

## 2022-01-01 PROCEDURE — 96366 THER/PROPH/DIAG IV INF ADDON: CPT

## 2022-01-01 PROCEDURE — 85014 HEMATOCRIT: CPT

## 2022-01-01 PROCEDURE — 74177 CT ABD & PELVIS W/CONTRAST: CPT | Mod: 26,MA

## 2022-01-01 PROCEDURE — 73630 X-RAY EXAM OF FOOT: CPT | Mod: 26,RT

## 2022-01-01 PROCEDURE — 87637 SARSCOV2&INF A&B&RSV AMP PRB: CPT

## 2022-01-01 PROCEDURE — A9521: CPT

## 2022-01-01 PROCEDURE — 93923 UPR/LXTR ART STDY 3+ LVLS: CPT | Mod: 26

## 2022-01-01 PROCEDURE — 93312 ECHO TRANSESOPHAGEAL: CPT | Mod: 26

## 2022-01-01 PROCEDURE — 73030 X-RAY EXAM OF SHOULDER: CPT | Mod: RT

## 2022-01-01 PROCEDURE — 99223 1ST HOSP IP/OBS HIGH 75: CPT | Mod: FS

## 2022-01-01 PROCEDURE — 84132 ASSAY OF SERUM POTASSIUM: CPT

## 2022-01-01 PROCEDURE — 87635 SARS-COV-2 COVID-19 AMP PRB: CPT

## 2022-01-01 PROCEDURE — 43235 EGD DIAGNOSTIC BRUSH WASH: CPT

## 2022-01-01 PROCEDURE — 77334 RADIATION TREATMENT AID(S): CPT | Mod: 26

## 2022-01-01 PROCEDURE — 82947 ASSAY GLUCOSE BLOOD QUANT: CPT

## 2022-01-01 PROCEDURE — 86140 C-REACTIVE PROTEIN: CPT

## 2022-01-01 PROCEDURE — 77435 SBRT MANAGEMENT: CPT

## 2022-01-01 PROCEDURE — 74018 RADEX ABDOMEN 1 VIEW: CPT | Mod: 26

## 2022-01-01 PROCEDURE — G0439: CPT

## 2022-01-01 PROCEDURE — 73630 X-RAY EXAM OF FOOT: CPT | Mod: 26,50

## 2022-01-01 PROCEDURE — 76604 US EXAM CHEST: CPT | Mod: 26,GC

## 2022-01-01 PROCEDURE — 76705 ECHO EXAM OF ABDOMEN: CPT | Mod: 26

## 2022-01-01 PROCEDURE — XXXXX: CPT | Mod: 1L

## 2022-01-01 DEVICE — SURGIFOAM PAD 8CM X 12.5CM X 10MM (100): Type: IMPLANTABLE DEVICE | Status: FUNCTIONAL

## 2022-01-01 RX ORDER — CHOLECALCIFEROL (VITAMIN D3) 125 MCG
1 CAPSULE ORAL
Qty: 0 | Refills: 0 | DISCHARGE

## 2022-01-01 RX ORDER — LIPASE/PROTEASE/AMYLASE 16-48-48K
3 CAPSULE,DELAYED RELEASE (ENTERIC COATED) ORAL
Refills: 0 | Status: DISCONTINUED | OUTPATIENT
Start: 2022-01-01 | End: 2022-01-01

## 2022-01-01 RX ORDER — ACETAMINOPHEN 500 MG
2 TABLET ORAL
Qty: 0 | Refills: 0 | DISCHARGE
Start: 2022-01-01

## 2022-01-01 RX ORDER — OXYCODONE 5 MG/1
5 TABLET ORAL
Qty: 8 | Refills: 0 | Status: ACTIVE | COMMUNITY
Start: 2022-01-01 | End: 1900-01-01

## 2022-01-01 RX ORDER — PIPERACILLIN AND TAZOBACTAM 4; .5 G/20ML; G/20ML
3.38 INJECTION, POWDER, LYOPHILIZED, FOR SOLUTION INTRAVENOUS EVERY 8 HOURS
Refills: 0 | Status: DISCONTINUED | OUTPATIENT
Start: 2022-01-01 | End: 2022-01-01

## 2022-01-01 RX ORDER — HYDROMORPHONE HYDROCHLORIDE 2 MG/ML
0.5 INJECTION INTRAMUSCULAR; INTRAVENOUS; SUBCUTANEOUS
Refills: 0 | Status: DISCONTINUED | OUTPATIENT
Start: 2022-01-01 | End: 2022-01-01

## 2022-01-01 RX ORDER — CEFEPIME 1 G/1
2000 INJECTION, POWDER, FOR SOLUTION INTRAMUSCULAR; INTRAVENOUS EVERY 8 HOURS
Refills: 0 | Status: DISCONTINUED | OUTPATIENT
Start: 2022-01-01 | End: 2022-01-01

## 2022-01-01 RX ORDER — POTASSIUM CHLORIDE 20 MEQ
40 PACKET (EA) ORAL ONCE
Refills: 0 | Status: COMPLETED | OUTPATIENT
Start: 2022-01-01 | End: 2022-01-01

## 2022-01-01 RX ORDER — FUROSEMIDE 40 MG
20 TABLET ORAL DAILY
Refills: 0 | Status: DISCONTINUED | OUTPATIENT
Start: 2022-01-01 | End: 2022-01-01

## 2022-01-01 RX ORDER — ACETAMINOPHEN 500 MG
650 TABLET ORAL EVERY 6 HOURS
Refills: 0 | Status: DISCONTINUED | OUTPATIENT
Start: 2022-01-01 | End: 2022-01-01

## 2022-01-01 RX ORDER — MUPIROCIN 20 MG/G
1 OINTMENT TOPICAL
Qty: 0 | Refills: 0 | DISCHARGE
Start: 2022-01-01

## 2022-01-01 RX ORDER — APIXABAN 2.5 MG/1
5 TABLET, FILM COATED ORAL
Refills: 0 | Status: DISCONTINUED | OUTPATIENT
Start: 2022-01-01 | End: 2022-01-01

## 2022-01-01 RX ORDER — OXYCODONE HYDROCHLORIDE 5 MG/1
5 TABLET ORAL EVERY 4 HOURS
Refills: 0 | Status: DISCONTINUED | OUTPATIENT
Start: 2022-01-01 | End: 2022-01-01

## 2022-01-01 RX ORDER — HEPARIN SODIUM 5000 [USP'U]/ML
1200 INJECTION INTRAVENOUS; SUBCUTANEOUS
Qty: 25000 | Refills: 0 | Status: DISCONTINUED | OUTPATIENT
Start: 2022-01-01 | End: 2022-01-01

## 2022-01-01 RX ORDER — METOPROLOL TARTRATE 50 MG
50 TABLET ORAL DAILY
Refills: 0 | Status: DISCONTINUED | OUTPATIENT
Start: 2022-01-01 | End: 2022-01-01

## 2022-01-01 RX ORDER — FENTANYL CITRATE 50 UG/ML
50 INJECTION INTRAVENOUS ONCE
Refills: 0 | Status: DISCONTINUED | OUTPATIENT
Start: 2022-01-01 | End: 2022-01-01

## 2022-01-01 RX ORDER — DEXTROSE 50 % IN WATER 50 %
25 SYRINGE (ML) INTRAVENOUS ONCE
Refills: 0 | Status: DISCONTINUED | OUTPATIENT
Start: 2022-01-01 | End: 2022-01-01

## 2022-01-01 RX ORDER — ALPRAZOLAM 0.25 MG
0.25 TABLET ORAL ONCE
Refills: 0 | Status: DISCONTINUED | OUTPATIENT
Start: 2022-01-01 | End: 2022-01-01

## 2022-01-01 RX ORDER — UBIDECARENONE 100 MG
1 CAPSULE ORAL
Qty: 0 | Refills: 0 | DISCHARGE

## 2022-01-01 RX ORDER — INSULIN LISPRO 100/ML
VIAL (ML) SUBCUTANEOUS
Refills: 0 | Status: DISCONTINUED | OUTPATIENT
Start: 2022-01-01 | End: 2022-01-01

## 2022-01-01 RX ORDER — ONDANSETRON 8 MG/1
4 TABLET, FILM COATED ORAL ONCE
Refills: 0 | Status: DISCONTINUED | OUTPATIENT
Start: 2022-01-01 | End: 2022-01-01

## 2022-01-01 RX ORDER — METOPROLOL TARTRATE 50 MG
1 TABLET ORAL
Qty: 0 | Refills: 0 | DISCHARGE

## 2022-01-01 RX ORDER — OXYCODONE HYDROCHLORIDE 5 MG/1
5 TABLET ORAL EVERY 6 HOURS
Refills: 0 | Status: DISCONTINUED | OUTPATIENT
Start: 2022-01-01 | End: 2022-01-01

## 2022-01-01 RX ORDER — HEPARIN SODIUM 5000 [USP'U]/ML
3500 INJECTION INTRAVENOUS; SUBCUTANEOUS EVERY 6 HOURS
Refills: 0 | Status: DISCONTINUED | OUTPATIENT
Start: 2022-01-01 | End: 2022-01-01

## 2022-01-01 RX ORDER — PANTOPRAZOLE SODIUM 20 MG/1
40 TABLET, DELAYED RELEASE ORAL
Refills: 0 | Status: DISCONTINUED | OUTPATIENT
Start: 2022-01-01 | End: 2022-01-01

## 2022-01-01 RX ORDER — OXYCODONE HYDROCHLORIDE 5 MG/1
10 TABLET ORAL EVERY 4 HOURS
Refills: 0 | Status: DISCONTINUED | OUTPATIENT
Start: 2022-01-01 | End: 2022-01-01

## 2022-01-01 RX ORDER — OXYCODONE HYDROCHLORIDE 5 MG/1
10 TABLET ORAL ONCE
Refills: 0 | Status: DISCONTINUED | OUTPATIENT
Start: 2022-01-01 | End: 2022-01-01

## 2022-01-01 RX ORDER — ALPRAZOLAM 0.25 MG
1 TABLET ORAL
Qty: 0 | Refills: 0 | DISCHARGE

## 2022-01-01 RX ORDER — ONDANSETRON 8 MG/1
4 TABLET, FILM COATED ORAL ONCE
Refills: 0 | Status: COMPLETED | OUTPATIENT
Start: 2022-01-01 | End: 2022-01-01

## 2022-01-01 RX ORDER — ENOXAPARIN SODIUM 100 MG/ML
40 INJECTION SUBCUTANEOUS EVERY 24 HOURS
Refills: 0 | Status: DISCONTINUED | OUTPATIENT
Start: 2022-01-01 | End: 2022-01-01

## 2022-01-01 RX ORDER — MAGNESIUM SULFATE 500 MG/ML
1 VIAL (ML) INJECTION ONCE
Refills: 0 | Status: COMPLETED | OUTPATIENT
Start: 2022-01-01 | End: 2022-01-01

## 2022-01-01 RX ORDER — METOCLOPRAMIDE HCL 10 MG
1 TABLET ORAL
Qty: 0 | Refills: 0 | DISCHARGE

## 2022-01-01 RX ORDER — OXYCODONE HYDROCHLORIDE 5 MG/1
1 TABLET ORAL
Qty: 15 | Refills: 0
Start: 2022-01-01

## 2022-01-01 RX ORDER — CALAMINE AND ZINC OXIDE AND PHENOL 160; 10 MG/ML; MG/ML
1 LOTION TOPICAL ONCE
Refills: 0 | Status: COMPLETED | OUTPATIENT
Start: 2022-01-01 | End: 2022-01-01

## 2022-01-01 RX ORDER — DULOXETINE HYDROCHLORIDE 30 MG/1
60 CAPSULE, DELAYED RELEASE ORAL DAILY
Refills: 0 | Status: DISCONTINUED | OUTPATIENT
Start: 2022-01-01 | End: 2022-01-01

## 2022-01-01 RX ORDER — LATANOPROST 0.05 MG/ML
1 SOLUTION/ DROPS OPHTHALMIC; TOPICAL AT BEDTIME
Refills: 0 | Status: DISCONTINUED | OUTPATIENT
Start: 2022-01-01 | End: 2022-01-01

## 2022-01-01 RX ORDER — SODIUM CHLORIDE 9 MG/ML
500 INJECTION, SOLUTION INTRAVENOUS
Refills: 0 | Status: COMPLETED | OUTPATIENT
Start: 2022-01-01 | End: 2022-01-01

## 2022-01-01 RX ORDER — PYRIDOXINE HCL (VITAMIN B6) 100 MG
1 TABLET ORAL
Qty: 0 | Refills: 0 | DISCHARGE

## 2022-01-01 RX ORDER — DEXTROSE 50 % IN WATER 50 %
12.5 SYRINGE (ML) INTRAVENOUS ONCE
Refills: 0 | Status: DISCONTINUED | OUTPATIENT
Start: 2022-01-01 | End: 2022-01-01

## 2022-01-01 RX ORDER — METRONIDAZOLE 500 MG
2 TABLET ORAL
Qty: 60 | Refills: 0
Start: 2022-01-01 | End: 2022-01-01

## 2022-01-01 RX ORDER — AMIODARONE HYDROCHLORIDE 400 MG/1
200 TABLET ORAL DAILY
Refills: 0 | Status: DISCONTINUED | OUTPATIENT
Start: 2022-01-01 | End: 2022-01-01

## 2022-01-01 RX ORDER — ALPRAZOLAM 0.25 MG
0.25 TABLET ORAL DAILY
Refills: 0 | Status: DISCONTINUED | OUTPATIENT
Start: 2022-01-01 | End: 2022-01-01

## 2022-01-01 RX ORDER — ALPRAZOLAM 0.25 MG
0.25 TABLET ORAL AT BEDTIME
Refills: 0 | Status: DISCONTINUED | OUTPATIENT
Start: 2022-01-01 | End: 2022-01-01

## 2022-01-01 RX ORDER — METOCLOPRAMIDE HCL 10 MG
10 TABLET ORAL EVERY 4 HOURS
Refills: 0 | Status: ACTIVE | OUTPATIENT
Start: 2022-01-01 | End: 2023-09-30

## 2022-01-01 RX ORDER — ONDANSETRON HYDROCHLORIDE 4 MG/1
4 TABLET, FILM COATED ORAL
Refills: 0 | Status: DISCONTINUED | COMMUNITY
End: 2022-01-01

## 2022-01-01 RX ORDER — MULTIVIT-MIN/FERROUS GLUCONATE 9 MG/15 ML
1 LIQUID (ML) ORAL DAILY
Refills: 0 | Status: CANCELLED | OUTPATIENT
Start: 2022-01-01 | End: 2022-01-01

## 2022-01-01 RX ORDER — SACUBITRIL AND VALSARTAN 24; 26 MG/1; MG/1
1 TABLET, FILM COATED ORAL
Refills: 0 | Status: DISCONTINUED | OUTPATIENT
Start: 2022-01-01 | End: 2022-01-01

## 2022-01-01 RX ORDER — NALOXONE HYDROCHLORIDE 4 MG/.1ML
1 SPRAY NASAL
Qty: 1 | Refills: 0
Start: 2022-01-01

## 2022-01-01 RX ORDER — TIMOLOL 0.5 %
1 DROPS OPHTHALMIC (EYE) EVERY 12 HOURS
Refills: 0 | Status: DISCONTINUED | OUTPATIENT
Start: 2022-01-01 | End: 2022-01-01

## 2022-01-01 RX ORDER — AMIODARONE HYDROCHLORIDE 200 MG/1
200 TABLET ORAL DAILY
Qty: 90 | Refills: 3 | Status: ACTIVE | COMMUNITY
Start: 2021-01-01

## 2022-01-01 RX ORDER — INFLUENZA VIRUS VACCINE 15; 15; 15; 15 UG/.5ML; UG/.5ML; UG/.5ML; UG/.5ML
0.7 SUSPENSION INTRAMUSCULAR ONCE
Refills: 0 | Status: COMPLETED | OUTPATIENT
Start: 2022-01-01 | End: 2022-01-01

## 2022-01-01 RX ORDER — ALPRAZOLAM 0.25 MG/1
0.25 TABLET ORAL
Qty: 60 | Refills: 0 | Status: ACTIVE | COMMUNITY
Start: 2022-01-01

## 2022-01-01 RX ORDER — SACUBITRIL AND VALSARTAN 24; 26 MG/1; MG/1
0 TABLET, FILM COATED ORAL
Qty: 0 | Refills: 0 | DISCHARGE

## 2022-01-01 RX ORDER — GABAPENTIN 100 MG/1
100 CAPSULE ORAL 3 TIMES DAILY
Qty: 30 | Refills: 1 | Status: ACTIVE | COMMUNITY
Start: 2022-01-01 | End: 1900-01-01

## 2022-01-01 RX ORDER — VASOPRESSIN 20 [USP'U]/ML
0.03 INJECTION INTRAVENOUS
Qty: 40 | Refills: 0 | Status: DISCONTINUED | OUTPATIENT
Start: 2022-01-01 | End: 2022-01-01

## 2022-01-01 RX ORDER — RIVAROXABAN 15 MG-20MG
20 KIT ORAL
Refills: 0 | Status: DISCONTINUED | OUTPATIENT
Start: 2022-01-01 | End: 2022-01-01

## 2022-01-01 RX ORDER — TAMSULOSIN HYDROCHLORIDE 0.4 MG/1
0.4 CAPSULE ORAL AT BEDTIME
Refills: 0 | Status: DISCONTINUED | OUTPATIENT
Start: 2022-01-01 | End: 2022-01-01

## 2022-01-01 RX ORDER — ALBUMIN HUMAN 25 %
100 VIAL (ML) INTRAVENOUS ONCE
Refills: 0 | Status: COMPLETED | OUTPATIENT
Start: 2022-01-01 | End: 2022-01-01

## 2022-01-01 RX ORDER — HEPARIN SODIUM 5000 [USP'U]/ML
7000 INJECTION INTRAVENOUS; SUBCUTANEOUS EVERY 6 HOURS
Refills: 0 | Status: DISCONTINUED | OUTPATIENT
Start: 2022-01-01 | End: 2022-01-01

## 2022-01-01 RX ORDER — SODIUM CHLORIDE 9 MG/ML
250 INJECTION, SOLUTION INTRAVENOUS ONCE
Refills: 0 | Status: COMPLETED | OUTPATIENT
Start: 2022-01-01 | End: 2022-01-01

## 2022-01-01 RX ORDER — TRAMADOL HYDROCHLORIDE 50 MG/1
25 TABLET ORAL THREE TIMES A DAY
Refills: 0 | Status: DISCONTINUED | OUTPATIENT
Start: 2022-01-01 | End: 2022-01-01

## 2022-01-01 RX ORDER — SODIUM BICARBONATE 1 MEQ/ML
50 SYRINGE (ML) INTRAVENOUS ONCE
Refills: 0 | Status: COMPLETED | OUTPATIENT
Start: 2022-01-01 | End: 2022-01-01

## 2022-01-01 RX ORDER — SODIUM CHLORIDE 9 MG/ML
250 INJECTION INTRAMUSCULAR; INTRAVENOUS; SUBCUTANEOUS ONCE
Refills: 0 | Status: COMPLETED | OUTPATIENT
Start: 2022-01-01 | End: 2022-01-01

## 2022-01-01 RX ORDER — LACTOBACILLUS ACIDOPHILUS 100MM CELL
2 CAPSULE ORAL
Qty: 10 | Refills: 0
Start: 2022-01-01 | End: 2022-01-01

## 2022-01-01 RX ORDER — VANCOMYCIN HCL 1 G
1000 VIAL (EA) INTRAVENOUS ONCE
Refills: 0 | Status: COMPLETED | OUTPATIENT
Start: 2022-01-01 | End: 2022-01-01

## 2022-01-01 RX ORDER — SIMETHICONE 80 MG/1
1 TABLET, CHEWABLE ORAL
Qty: 0 | Refills: 0 | DISCHARGE
Start: 2022-01-01

## 2022-01-01 RX ORDER — RIVAROXABAN 15 MG-20MG
15 KIT ORAL
Refills: 0 | Status: DISCONTINUED | OUTPATIENT
Start: 2022-01-01 | End: 2022-01-01

## 2022-01-01 RX ORDER — FAMOTIDINE 10 MG/ML
40 INJECTION INTRAVENOUS ONCE
Refills: 0 | Status: COMPLETED | OUTPATIENT
Start: 2022-01-01 | End: 2022-01-01

## 2022-01-01 RX ORDER — POTASSIUM PHOSPHATE, MONOBASIC POTASSIUM PHOSPHATE, DIBASIC 236; 224 MG/ML; MG/ML
30 INJECTION, SOLUTION INTRAVENOUS ONCE
Refills: 0 | Status: COMPLETED | OUTPATIENT
Start: 2022-01-01 | End: 2022-01-01

## 2022-01-01 RX ORDER — SODIUM CHLORIDE 9 MG/ML
500 INJECTION, SOLUTION INTRAVENOUS ONCE
Refills: 0 | Status: COMPLETED | OUTPATIENT
Start: 2022-01-01 | End: 2022-01-01

## 2022-01-01 RX ORDER — PROCHLORPERAZINE MALEATE 10 MG/1
10 TABLET ORAL EVERY 6 HOURS
Qty: 120 | Refills: 1 | Status: ACTIVE | COMMUNITY
Start: 2022-01-01 | End: 1900-01-01

## 2022-01-01 RX ORDER — LEVOTHYROXINE SODIUM 125 MCG
25 TABLET ORAL DAILY
Refills: 0 | Status: DISCONTINUED | OUTPATIENT
Start: 2022-01-01 | End: 2022-01-01

## 2022-01-01 RX ORDER — GABAPENTIN 400 MG/1
1 CAPSULE ORAL
Qty: 0 | Refills: 0 | DISCHARGE
Start: 2022-01-01

## 2022-01-01 RX ORDER — ALPRAZOLAM 0.25 MG
1 TABLET ORAL
Qty: 0 | Refills: 0 | DISCHARGE
Start: 2022-01-01

## 2022-01-01 RX ORDER — TRAMADOL HYDROCHLORIDE 50 MG/1
25 TABLET ORAL AT BEDTIME
Refills: 0 | Status: DISCONTINUED | OUTPATIENT
Start: 2022-01-01 | End: 2022-01-01

## 2022-01-01 RX ORDER — MAGNESIUM OXIDE 400 MG ORAL TABLET 241.3 MG
400 TABLET ORAL
Refills: 0 | Status: COMPLETED | OUTPATIENT
Start: 2022-01-01 | End: 2022-01-01

## 2022-01-01 RX ORDER — LANOLIN ALCOHOL/MO/W.PET/CERES
1 CREAM (GRAM) TOPICAL
Qty: 0 | Refills: 0 | DISCHARGE
Start: 2022-01-01

## 2022-01-01 RX ORDER — SODIUM CHLORIDE 9 MG/ML
1000 INJECTION INTRAMUSCULAR; INTRAVENOUS; SUBCUTANEOUS ONCE
Refills: 0 | Status: COMPLETED | OUTPATIENT
Start: 2022-01-01 | End: 2022-01-01

## 2022-01-01 RX ORDER — LEVOTHYROXINE SODIUM 125 MCG
1 TABLET ORAL
Qty: 0 | Refills: 0 | DISCHARGE

## 2022-01-01 RX ORDER — BRIMONIDINE TARTRATE, TIMOLOL MALEATE 2; 5 MG/ML; MG/ML
1 SOLUTION/ DROPS OPHTHALMIC
Qty: 0 | Refills: 0 | DISCHARGE

## 2022-01-01 RX ORDER — VANCOMYCIN HCL 1 G
1000 VIAL (EA) INTRAVENOUS EVERY 24 HOURS
Refills: 0 | Status: DISCONTINUED | OUTPATIENT
Start: 2022-01-01 | End: 2022-01-01

## 2022-01-01 RX ORDER — SACUBITRIL AND VALSARTAN 24; 26 MG/1; MG/1
1 TABLET, FILM COATED ORAL
Qty: 0 | Refills: 0 | DISCHARGE

## 2022-01-01 RX ORDER — MAGNESIUM SULFATE 500 MG/ML
2 VIAL (ML) INJECTION ONCE
Refills: 0 | Status: COMPLETED | OUTPATIENT
Start: 2022-01-01 | End: 2022-01-01

## 2022-01-01 RX ORDER — PIPERACILLIN AND TAZOBACTAM 4; .5 G/20ML; G/20ML
3.38 INJECTION, POWDER, LYOPHILIZED, FOR SOLUTION INTRAVENOUS ONCE
Refills: 0 | Status: COMPLETED | OUTPATIENT
Start: 2022-01-01 | End: 2022-01-01

## 2022-01-01 RX ORDER — PHENYLEPHRINE HYDROCHLORIDE 10 MG/ML
6.5 INJECTION INTRAVENOUS
Qty: 160 | Refills: 0 | Status: DISCONTINUED | OUTPATIENT
Start: 2022-01-01 | End: 2022-01-01

## 2022-01-01 RX ORDER — RIVAROXABAN 15 MG-20MG
1 KIT ORAL
Qty: 60 | Refills: 0
Start: 2022-01-01 | End: 2022-12-16

## 2022-01-01 RX ORDER — DEXTROSE 50 % IN WATER 50 %
15 SYRINGE (ML) INTRAVENOUS ONCE
Refills: 0 | Status: DISCONTINUED | OUTPATIENT
Start: 2022-01-01 | End: 2022-01-01

## 2022-01-01 RX ORDER — CHLORHEXIDINE GLUCONATE 213 G/1000ML
1 SOLUTION TOPICAL
Refills: 0 | Status: DISCONTINUED | OUTPATIENT
Start: 2022-01-01 | End: 2022-01-01

## 2022-01-01 RX ORDER — IBUPROFEN 200 MG
1 TABLET ORAL
Qty: 12 | Refills: 0
Start: 2022-01-01 | End: 2022-01-01

## 2022-01-01 RX ORDER — LEVOTHYROXINE SODIUM 0.03 MG/1
25 TABLET ORAL DAILY
Qty: 90 | Refills: 3 | Status: ACTIVE | COMMUNITY
Start: 2022-01-01 | End: 1900-01-01

## 2022-01-01 RX ORDER — MAGNESIUM OXIDE 400 MG ORAL TABLET 241.3 MG
400 TABLET ORAL ONCE
Refills: 0 | Status: DISCONTINUED | OUTPATIENT
Start: 2022-01-01 | End: 2022-01-01

## 2022-01-01 RX ORDER — OXYCODONE HYDROCHLORIDE 5 MG/1
1 TABLET ORAL
Qty: 0 | Refills: 0 | DISCHARGE

## 2022-01-01 RX ORDER — LANOLIN ALCOHOL/MO/W.PET/CERES
3 CREAM (GRAM) TOPICAL AT BEDTIME
Refills: 0 | Status: DISCONTINUED | OUTPATIENT
Start: 2022-01-01 | End: 2022-01-01

## 2022-01-01 RX ORDER — VASOPRESSIN 20 [USP'U]/ML
0.04 INJECTION INTRAVENOUS
Qty: 40 | Refills: 0 | Status: DISCONTINUED | OUTPATIENT
Start: 2022-01-01 | End: 2022-01-01

## 2022-01-01 RX ORDER — QUETIAPINE FUMARATE 200 MG/1
1 TABLET, FILM COATED ORAL
Qty: 0 | Refills: 0 | DISCHARGE

## 2022-01-01 RX ORDER — GABAPENTIN 400 MG/1
1 CAPSULE ORAL
Qty: 0 | Refills: 0 | DISCHARGE

## 2022-01-01 RX ORDER — PHYTONADIONE (VIT K1) 5 MG
5 TABLET ORAL ONCE
Refills: 0 | Status: COMPLETED | OUTPATIENT
Start: 2022-01-01 | End: 2022-01-01

## 2022-01-01 RX ORDER — SODIUM CHLORIDE 9 MG/ML
1000 INJECTION, SOLUTION INTRAVENOUS
Refills: 0 | Status: DISCONTINUED | OUTPATIENT
Start: 2022-01-01 | End: 2022-01-01

## 2022-01-01 RX ORDER — FUROSEMIDE 40 MG
1 TABLET ORAL
Qty: 0 | Refills: 0 | DISCHARGE

## 2022-01-01 RX ORDER — OXYCODONE HYDROCHLORIDE 5 MG/1
1 TABLET ORAL
Qty: 0 | Refills: 0 | DISCHARGE
Start: 2022-01-01

## 2022-01-01 RX ORDER — HYDROXYZINE HCL 10 MG
25 TABLET ORAL ONCE
Refills: 0 | Status: COMPLETED | OUTPATIENT
Start: 2022-01-01 | End: 2022-01-01

## 2022-01-01 RX ORDER — LIDOCAINE 4 G/100G
1 CREAM TOPICAL ONCE
Refills: 0 | Status: COMPLETED | OUTPATIENT
Start: 2022-01-01 | End: 2022-01-01

## 2022-01-01 RX ORDER — FUROSEMIDE 40 MG
20 TABLET ORAL ONCE
Refills: 0 | Status: COMPLETED | OUTPATIENT
Start: 2022-01-01 | End: 2022-01-01

## 2022-01-01 RX ORDER — ALBUMIN HUMAN 25 %
250 VIAL (ML) INTRAVENOUS ONCE
Refills: 0 | Status: COMPLETED | OUTPATIENT
Start: 2022-01-01 | End: 2022-01-01

## 2022-01-01 RX ORDER — FAMOTIDINE 10 MG/ML
20 INJECTION INTRAVENOUS ONCE
Refills: 0 | Status: COMPLETED | OUTPATIENT
Start: 2022-01-01 | End: 2022-01-01

## 2022-01-01 RX ORDER — SODIUM CHLORIDE 9 MG/ML
2000 INJECTION INTRAMUSCULAR; INTRAVENOUS; SUBCUTANEOUS ONCE
Refills: 0 | Status: DISCONTINUED | OUTPATIENT
Start: 2022-01-01 | End: 2022-01-01

## 2022-01-01 RX ORDER — RIVAROXABAN 20 MG/1
20 TABLET, FILM COATED ORAL
Qty: 90 | Refills: 3 | Status: DISCONTINUED | COMMUNITY
Start: 2022-01-01 | End: 2022-01-01

## 2022-01-01 RX ORDER — FUROSEMIDE 20 MG/1
20 TABLET ORAL
Qty: 90 | Refills: 3 | Status: ACTIVE | COMMUNITY
Start: 2022-01-01

## 2022-01-01 RX ORDER — NOREPINEPHRINE BITARTRATE/D5W 8 MG/250ML
0.05 PLASTIC BAG, INJECTION (ML) INTRAVENOUS
Qty: 32 | Refills: 0 | Status: DISCONTINUED | OUTPATIENT
Start: 2022-01-01 | End: 2022-01-01

## 2022-01-01 RX ORDER — GABAPENTIN 300 MG/1
300 CAPSULE ORAL
Qty: 90 | Refills: 1 | Status: ACTIVE | COMMUNITY
Start: 1900-01-01 | End: 1900-01-01

## 2022-01-01 RX ORDER — CHLORHEXIDINE GLUCONATE 213 G/1000ML
1 SOLUTION TOPICAL DAILY
Refills: 0 | Status: DISCONTINUED | OUTPATIENT
Start: 2022-01-01 | End: 2022-01-01

## 2022-01-01 RX ORDER — METOPROLOL TARTRATE 25 MG/1
25 TABLET, FILM COATED ORAL TWICE DAILY
Qty: 60 | Refills: 3 | Status: DISCONTINUED | COMMUNITY
Start: 2021-01-01 | End: 2022-01-01

## 2022-01-01 RX ORDER — DULOXETINE HYDROCHLORIDE 30 MG/1
1 CAPSULE, DELAYED RELEASE ORAL
Qty: 0 | Refills: 0 | DISCHARGE

## 2022-01-01 RX ORDER — SODIUM CHLORIDE 9 MG/ML
1000 INJECTION INTRAMUSCULAR; INTRAVENOUS; SUBCUTANEOUS
Refills: 0 | Status: DISCONTINUED | OUTPATIENT
Start: 2022-01-01 | End: 2022-01-01

## 2022-01-01 RX ORDER — MULTIVIT-MIN/FERROUS GLUCONATE 9 MG/15 ML
1 LIQUID (ML) ORAL DAILY
Refills: 0 | Status: DISCONTINUED | OUTPATIENT
Start: 2022-01-01 | End: 2022-01-01

## 2022-01-01 RX ORDER — CIPROFLOXACIN HYDROCHLORIDE 500 MG/1
500 TABLET, FILM COATED ORAL TWICE DAILY
Qty: 14 | Refills: 0 | Status: COMPLETED | COMMUNITY
Start: 2022-01-01 | End: 2022-01-01

## 2022-01-01 RX ORDER — SODIUM CHLORIDE 9 MG/ML
500 INJECTION INTRAMUSCULAR; INTRAVENOUS; SUBCUTANEOUS ONCE
Refills: 0 | Status: COMPLETED | OUTPATIENT
Start: 2022-01-01 | End: 2022-01-01

## 2022-01-01 RX ORDER — ONDANSETRON 8 MG/1
4 TABLET, FILM COATED ORAL EVERY 6 HOURS
Refills: 0 | Status: DISCONTINUED | OUTPATIENT
Start: 2022-01-01 | End: 2022-01-01

## 2022-01-01 RX ORDER — INSULIN LISPRO 100/ML
VIAL (ML) SUBCUTANEOUS AT BEDTIME
Refills: 0 | Status: DISCONTINUED | OUTPATIENT
Start: 2022-01-01 | End: 2022-01-01

## 2022-01-01 RX ORDER — PANTOPRAZOLE 40 MG/1
40 TABLET, DELAYED RELEASE ORAL DAILY
Qty: 90 | Refills: 2 | Status: ACTIVE | COMMUNITY
Start: 2022-01-01

## 2022-01-01 RX ORDER — KETAMINE HYDROCHLORIDE 100 MG/ML
2 INJECTION INTRAMUSCULAR; INTRAVENOUS
Qty: 1000 | Refills: 0 | Status: DISCONTINUED | OUTPATIENT
Start: 2022-01-01 | End: 2022-01-01

## 2022-01-01 RX ORDER — HEPARIN SODIUM 5000 [USP'U]/ML
1000 INJECTION INTRAVENOUS; SUBCUTANEOUS
Qty: 25000 | Refills: 0 | Status: DISCONTINUED | OUTPATIENT
Start: 2022-01-01 | End: 2022-01-01

## 2022-01-01 RX ORDER — ASCORBIC ACID 60 MG
500 TABLET,CHEWABLE ORAL DAILY
Refills: 0 | Status: DISCONTINUED | OUTPATIENT
Start: 2022-01-01 | End: 2022-01-01

## 2022-01-01 RX ORDER — FENTANYL CITRATE 50 UG/ML
50 INJECTION INTRAVENOUS
Refills: 0 | Status: COMPLETED | OUTPATIENT
Start: 2022-01-01 | End: 2022-01-01

## 2022-01-01 RX ORDER — GABAPENTIN 400 MG/1
100 CAPSULE ORAL THREE TIMES A DAY
Refills: 0 | Status: DISCONTINUED | OUTPATIENT
Start: 2022-01-01 | End: 2022-01-01

## 2022-01-01 RX ORDER — QUETIAPINE FUMARATE 200 MG/1
25 TABLET, FILM COATED ORAL AT BEDTIME
Refills: 0 | Status: DISCONTINUED | OUTPATIENT
Start: 2022-01-01 | End: 2022-01-01

## 2022-01-01 RX ORDER — PANTOPRAZOLE SODIUM 20 MG/1
1 TABLET, DELAYED RELEASE ORAL
Qty: 0 | Refills: 0 | DISCHARGE

## 2022-01-01 RX ORDER — NOREPINEPHRINE BITARTRATE/D5W 8 MG/250ML
2 PLASTIC BAG, INJECTION (ML) INTRAVENOUS
Qty: 32 | Refills: 0 | Status: DISCONTINUED | OUTPATIENT
Start: 2022-01-01 | End: 2022-01-01

## 2022-01-01 RX ORDER — BENZOCAINE AND MENTHOL 5; 1 G/100ML; G/100ML
1 LIQUID ORAL
Refills: 0 | Status: DISCONTINUED | OUTPATIENT
Start: 2022-01-01 | End: 2022-01-01

## 2022-01-01 RX ORDER — MUPIROCIN 20 MG/G
2 OINTMENT TOPICAL TWICE DAILY
Qty: 1 | Refills: 3 | Status: ACTIVE | COMMUNITY
Start: 2022-01-01 | End: 1900-01-01

## 2022-01-01 RX ORDER — TAMSULOSIN HYDROCHLORIDE 0.4 MG/1
1 CAPSULE ORAL
Qty: 0 | Refills: 0 | DISCHARGE

## 2022-01-01 RX ORDER — SODIUM BICARBONATE 1 MEQ/ML
0.25 SYRINGE (ML) INTRAVENOUS
Qty: 150 | Refills: 0 | Status: DISCONTINUED | OUTPATIENT
Start: 2022-01-01 | End: 2022-01-01

## 2022-01-01 RX ORDER — MUPIROCIN 20 MG/G
1 OINTMENT TOPICAL
Refills: 0 | Status: DISCONTINUED | OUTPATIENT
Start: 2022-01-01 | End: 2022-01-01

## 2022-01-01 RX ORDER — FUROSEMIDE 20 MG/1
20 TABLET ORAL DAILY
Qty: 30 | Refills: 2 | Status: DISCONTINUED | COMMUNITY
Start: 2021-01-01 | End: 2022-01-01

## 2022-01-01 RX ORDER — PROPOFOL 10 MG/ML
10 INJECTION, EMULSION INTRAVENOUS
Qty: 1000 | Refills: 0 | Status: DISCONTINUED | OUTPATIENT
Start: 2022-01-01 | End: 2022-01-01

## 2022-01-01 RX ORDER — ONDANSETRON 8 MG/1
8 TABLET, FILM COATED ORAL EVERY 8 HOURS
Refills: 0 | Status: ACTIVE | OUTPATIENT
Start: 2022-01-01 | End: 2023-10-03

## 2022-01-01 RX ORDER — PANCRELIPASE 36000; 180000; 114000 [USP'U]/1; [USP'U]/1; [USP'U]/1
36000-114000 CAPSULE, DELAYED RELEASE PELLETS ORAL
Qty: 360 | Refills: 4 | Status: ACTIVE | COMMUNITY
Start: 2021-10-18 | End: 1900-01-01

## 2022-01-01 RX ORDER — AMIODARONE HYDROCHLORIDE 400 MG/1
1 TABLET ORAL
Qty: 0 | Refills: 0 | DISCHARGE

## 2022-01-01 RX ORDER — HEPARIN SODIUM 5000 [USP'U]/ML
5000 INJECTION INTRAVENOUS; SUBCUTANEOUS EVERY 8 HOURS
Refills: 0 | Status: DISCONTINUED | OUTPATIENT
Start: 2022-01-01 | End: 2022-01-01

## 2022-01-01 RX ORDER — PETROLATUM,WHITE
1 JELLY (GRAM) TOPICAL
Qty: 0 | Refills: 0 | DISCHARGE
Start: 2022-01-01

## 2022-01-01 RX ORDER — SENNOSIDES 8.6 MG TABLETS 8.6 MG/1
TABLET ORAL
Refills: 0 | Status: DISCONTINUED | COMMUNITY
End: 2022-01-01

## 2022-01-01 RX ORDER — BRIMONIDINE TARTRATE 2 MG/MG
1 SOLUTION/ DROPS OPHTHALMIC
Refills: 0 | Status: DISCONTINUED | OUTPATIENT
Start: 2022-01-01 | End: 2022-01-01

## 2022-01-01 RX ORDER — SACUBITRIL AND VALSARTAN 49; 51 MG/1; MG/1
49-51 TABLET, FILM COATED ORAL TWICE DAILY
Qty: 180 | Refills: 3 | Status: ACTIVE | COMMUNITY
Start: 2022-01-01

## 2022-01-01 RX ORDER — ENOXAPARIN SODIUM 100 MG/ML
85 INJECTION SUBCUTANEOUS EVERY 12 HOURS
Refills: 0 | Status: DISCONTINUED | OUTPATIENT
Start: 2022-01-01 | End: 2022-01-01

## 2022-01-01 RX ORDER — PETROLATUM,WHITE
1 JELLY (GRAM) TOPICAL DAILY
Refills: 0 | Status: DISCONTINUED | OUTPATIENT
Start: 2022-01-01 | End: 2022-01-01

## 2022-01-01 RX ORDER — MIDODRINE HYDROCHLORIDE 2.5 MG/1
5 TABLET ORAL EVERY 8 HOURS
Refills: 0 | Status: DISCONTINUED | OUTPATIENT
Start: 2022-01-01 | End: 2022-01-01

## 2022-01-01 RX ORDER — METOCLOPRAMIDE HCL 10 MG
10 TABLET ORAL EVERY 6 HOURS
Refills: 0 | Status: DISCONTINUED | OUTPATIENT
Start: 2022-01-01 | End: 2022-01-01

## 2022-01-01 RX ORDER — VANCOMYCIN HCL 1 G
1250 VIAL (EA) INTRAVENOUS ONCE
Refills: 0 | Status: COMPLETED | OUTPATIENT
Start: 2022-01-01 | End: 2022-01-01

## 2022-01-01 RX ORDER — MIDODRINE HYDROCHLORIDE 2.5 MG/1
10 TABLET ORAL ONCE
Refills: 0 | Status: COMPLETED | OUTPATIENT
Start: 2022-01-01 | End: 2022-01-01

## 2022-01-01 RX ORDER — DIPHENHYDRAMINE HCL 50 MG
1 CAPSULE ORAL
Qty: 0 | Refills: 0 | DISCHARGE
Start: 2022-01-01

## 2022-01-01 RX ORDER — ASCORBIC ACID 60 MG
1 TABLET,CHEWABLE ORAL
Qty: 0 | Refills: 0 | DISCHARGE

## 2022-01-01 RX ORDER — GLUCAGON INJECTION, SOLUTION 0.5 MG/.1ML
1 INJECTION, SOLUTION SUBCUTANEOUS ONCE
Refills: 0 | Status: DISCONTINUED | OUTPATIENT
Start: 2022-01-01 | End: 2022-01-01

## 2022-01-01 RX ORDER — HEPARIN SODIUM 5000 [USP'U]/ML
INJECTION INTRAVENOUS; SUBCUTANEOUS
Qty: 25000 | Refills: 0 | Status: DISCONTINUED | OUTPATIENT
Start: 2022-01-01 | End: 2022-01-01

## 2022-01-01 RX ORDER — IBUPROFEN 200 MG
400 TABLET ORAL ONCE
Refills: 0 | Status: COMPLETED | OUTPATIENT
Start: 2022-01-01 | End: 2022-01-01

## 2022-01-01 RX ORDER — INFLUENZA VIRUS VACCINE 15; 15; 15; 15 UG/.5ML; UG/.5ML; UG/.5ML; UG/.5ML
0.7 SUSPENSION INTRAMUSCULAR ONCE
Refills: 0 | Status: DISCONTINUED | OUTPATIENT
Start: 2022-01-01 | End: 2022-01-01

## 2022-01-01 RX ORDER — ASCORBIC ACID 60 MG
500 TABLET,CHEWABLE ORAL DAILY
Refills: 0 | Status: CANCELLED | OUTPATIENT
Start: 2022-01-01 | End: 2022-01-01

## 2022-01-01 RX ORDER — HEPARIN SODIUM 5000 [USP'U]/ML
5000 INJECTION INTRAVENOUS; SUBCUTANEOUS EVERY 12 HOURS
Refills: 0 | Status: DISCONTINUED | OUTPATIENT
Start: 2022-01-01 | End: 2022-01-01

## 2022-01-01 RX ORDER — DIPHENHYDRAMINE HCL 50 MG
25 CAPSULE ORAL EVERY 6 HOURS
Refills: 0 | Status: DISCONTINUED | OUTPATIENT
Start: 2022-01-01 | End: 2022-01-01

## 2022-01-01 RX ORDER — ACETAMINOPHEN 500 MG
2 TABLET ORAL
Qty: 24 | Refills: 0
Start: 2022-01-01 | End: 2022-01-01

## 2022-01-01 RX ORDER — NOREPINEPHRINE BITARTRATE/D5W 8 MG/250ML
1.5 PLASTIC BAG, INJECTION (ML) INTRAVENOUS
Qty: 16 | Refills: 0 | Status: DISCONTINUED | OUTPATIENT
Start: 2022-01-01 | End: 2022-01-01

## 2022-01-01 RX ORDER — CHLORHEXIDINE GLUCONATE 213 G/1000ML
15 SOLUTION TOPICAL EVERY 12 HOURS
Refills: 0 | Status: DISCONTINUED | OUTPATIENT
Start: 2022-01-01 | End: 2022-01-01

## 2022-01-01 RX ORDER — MAGNESIUM SULFATE 500 MG/ML
1 VIAL (ML) INJECTION ONCE
Refills: 0 | Status: DISCONTINUED | OUTPATIENT
Start: 2022-01-01 | End: 2022-01-01

## 2022-01-01 RX ORDER — METOPROLOL SUCCINATE 50 MG/1
50 TABLET, EXTENDED RELEASE ORAL DAILY
Qty: 90 | Refills: 3 | Status: ACTIVE | COMMUNITY
Start: 2022-01-01

## 2022-01-01 RX ORDER — LIPASE/PROTEASE/AMYLASE 16-48-48K
3 CAPSULE,DELAYED RELEASE (ENTERIC COATED) ORAL
Qty: 0 | Refills: 0 | DISCHARGE

## 2022-01-01 RX ORDER — VANCOMYCIN HCL 1 G
1250 VIAL (EA) INTRAVENOUS EVERY 12 HOURS
Refills: 0 | Status: DISCONTINUED | OUTPATIENT
Start: 2022-01-01 | End: 2022-01-01

## 2022-01-01 RX ORDER — CELECOXIB 200 MG/1
200 CAPSULE ORAL DAILY
Qty: 90 | Refills: 3 | Status: ACTIVE | COMMUNITY
Start: 2022-01-01

## 2022-01-01 RX ORDER — ALPRAZOLAM 0.25 MG
0.5 TABLET ORAL ONCE
Refills: 0 | Status: DISCONTINUED | OUTPATIENT
Start: 2022-01-01 | End: 2022-01-01

## 2022-01-01 RX ORDER — OXYCODONE HYDROCHLORIDE 5 MG/1
1 TABLET ORAL
Qty: 18 | Refills: 0
Start: 2022-01-01 | End: 2022-01-01

## 2022-01-01 RX ORDER — SENNA PLUS 8.6 MG/1
2 TABLET ORAL AT BEDTIME
Refills: 0 | Status: DISCONTINUED | OUTPATIENT
Start: 2022-01-01 | End: 2022-01-01

## 2022-01-01 RX ORDER — SIMETHICONE 80 MG/1
80 TABLET, CHEWABLE ORAL THREE TIMES A DAY
Refills: 0 | Status: DISCONTINUED | OUTPATIENT
Start: 2022-01-01 | End: 2022-01-01

## 2022-01-01 RX ORDER — OXYCODONE HYDROCHLORIDE 5 MG/1
5 TABLET ORAL ONCE
Refills: 0 | Status: DISCONTINUED | OUTPATIENT
Start: 2022-01-01 | End: 2022-01-01

## 2022-01-01 RX ORDER — MOXIFLOXACIN HYDROCHLORIDE TABLETS, 400 MG 400 MG/1
1 TABLET, FILM COATED ORAL
Qty: 28 | Refills: 0
Start: 2022-01-01 | End: 2022-01-01

## 2022-01-01 RX ORDER — ACETAMINOPHEN 500 MG
1000 TABLET ORAL ONCE
Refills: 0 | Status: COMPLETED | OUTPATIENT
Start: 2022-01-01 | End: 2022-01-01

## 2022-01-01 RX ORDER — DULOXETINE HYDROCHLORIDE 30 MG/1
60 CAPSULE, DELAYED RELEASE ORAL AT BEDTIME
Refills: 0 | Status: DISCONTINUED | OUTPATIENT
Start: 2022-01-01 | End: 2022-01-01

## 2022-01-01 RX ORDER — HYDROMORPHONE HYDROCHLORIDE 2 MG/ML
0.25 INJECTION INTRAMUSCULAR; INTRAVENOUS; SUBCUTANEOUS
Refills: 0 | Status: DISCONTINUED | OUTPATIENT
Start: 2022-01-01 | End: 2022-01-01

## 2022-01-01 RX ORDER — AMOXICILLIN AND CLAVULANATE POTASSIUM 875; 125 MG/1; MG/1
875-125 TABLET, COATED ORAL
Qty: 30 | Refills: 2 | Status: DISCONTINUED | COMMUNITY
Start: 2022-01-01 | End: 2022-01-01

## 2022-01-01 RX ORDER — CELECOXIB 200 MG/1
1 CAPSULE ORAL
Qty: 0 | Refills: 0 | DISCHARGE

## 2022-01-01 RX ORDER — RIVAROXABAN 15 MG-20MG
1 KIT ORAL
Qty: 0 | Refills: 0 | DISCHARGE

## 2022-01-01 RX ORDER — NALOXONE HYDROCHLORIDE 4 MG/.1ML
1 SPRAY NASAL
Qty: 1 | Refills: 0
Start: 2022-01-01 | End: 2022-01-01

## 2022-01-01 RX ORDER — QUETIAPINE FUMARATE 25 MG/1
25 TABLET ORAL
Qty: 90 | Refills: 0 | Status: ACTIVE | COMMUNITY
Start: 2022-01-01 | End: 1900-01-01

## 2022-01-01 RX ORDER — ALBUTEROL 90 UG/1
2.5 AEROSOL, METERED ORAL ONCE
Refills: 0 | Status: COMPLETED | OUTPATIENT
Start: 2022-01-01 | End: 2022-01-01

## 2022-01-01 RX ORDER — ASCORBIC ACID 60 MG
1 TABLET,CHEWABLE ORAL
Qty: 14 | Refills: 0
Start: 2022-01-01 | End: 2022-01-01

## 2022-01-01 RX ORDER — IBUPROFEN 200 MG
400 TABLET ORAL EVERY 6 HOURS
Refills: 0 | Status: DISCONTINUED | OUTPATIENT
Start: 2022-01-01 | End: 2022-01-01

## 2022-01-01 RX ORDER — CALCIUM CARBONATE 500(1250)
1 TABLET ORAL EVERY 4 HOURS
Refills: 0 | Status: DISCONTINUED | OUTPATIENT
Start: 2022-01-01 | End: 2022-01-01

## 2022-01-01 RX ORDER — LIPASE/PROTEASE/AMYLASE 16-48-48K
3 CAPSULE,DELAYED RELEASE (ENTERIC COATED) ORAL
Qty: 0 | Refills: 0 | DISCHARGE
Start: 2022-01-01

## 2022-01-01 RX ORDER — METOCLOPRAMIDE HCL 10 MG
5 TABLET ORAL ONCE
Refills: 0 | Status: COMPLETED | OUTPATIENT
Start: 2022-01-01 | End: 2022-01-01

## 2022-01-01 RX ORDER — OXYCODONE HYDROCHLORIDE 5 MG/1
1 TABLET ORAL
Qty: 20 | Refills: 0
Start: 2022-01-01

## 2022-01-01 RX ORDER — NOREPINEPHRINE BITARTRATE/D5W 8 MG/250ML
0.05 PLASTIC BAG, INJECTION (ML) INTRAVENOUS
Qty: 8 | Refills: 0 | Status: DISCONTINUED | OUTPATIENT
Start: 2022-01-01 | End: 2022-01-01

## 2022-01-01 RX ORDER — LIDOCAINE AND PRILOCAINE CREAM 25; 25 MG/G; MG/G
1 CREAM TOPICAL
Qty: 0 | Refills: 0 | DISCHARGE

## 2022-01-01 RX ORDER — MIDODRINE HYDROCHLORIDE 2.5 MG/1
10 TABLET ORAL EVERY 8 HOURS
Refills: 0 | Status: DISCONTINUED | OUTPATIENT
Start: 2022-01-01 | End: 2022-01-01

## 2022-01-01 RX ORDER — PHYTONADIONE (VIT K1) 5 MG
2.5 TABLET ORAL ONCE
Refills: 0 | Status: DISCONTINUED | OUTPATIENT
Start: 2022-01-01 | End: 2022-01-01

## 2022-01-01 RX ADMIN — TAMSULOSIN HYDROCHLORIDE 0.4 MILLIGRAM(S): 0.4 CAPSULE ORAL at 22:01

## 2022-01-01 RX ADMIN — HEPARIN SODIUM 5000 UNIT(S): 5000 INJECTION INTRAVENOUS; SUBCUTANEOUS at 17:29

## 2022-01-01 RX ADMIN — Medication 650 MILLIGRAM(S): at 12:36

## 2022-01-01 RX ADMIN — Medication 3 CAPSULE(S): at 18:31

## 2022-01-01 RX ADMIN — ENOXAPARIN SODIUM 85 MILLIGRAM(S): 100 INJECTION SUBCUTANEOUS at 06:54

## 2022-01-01 RX ADMIN — Medication 3 CAPSULE(S): at 12:05

## 2022-01-01 RX ADMIN — HEPARIN SODIUM 5000 UNIT(S): 5000 INJECTION INTRAVENOUS; SUBCUTANEOUS at 21:43

## 2022-01-01 RX ADMIN — DULOXETINE HYDROCHLORIDE 60 MILLIGRAM(S): 30 CAPSULE, DELAYED RELEASE ORAL at 17:38

## 2022-01-01 RX ADMIN — GABAPENTIN 100 MILLIGRAM(S): 400 CAPSULE ORAL at 14:13

## 2022-01-01 RX ADMIN — Medication 25 MICROGRAM(S): at 06:14

## 2022-01-01 RX ADMIN — Medication 1000 MILLIGRAM(S): at 06:15

## 2022-01-01 RX ADMIN — Medication 3 CAPSULE(S): at 11:55

## 2022-01-01 RX ADMIN — OXYCODONE HYDROCHLORIDE 5 MILLIGRAM(S): 5 TABLET ORAL at 04:55

## 2022-01-01 RX ADMIN — Medication 0.25 MILLIGRAM(S): at 09:44

## 2022-01-01 RX ADMIN — DULOXETINE HYDROCHLORIDE 60 MILLIGRAM(S): 30 CAPSULE, DELAYED RELEASE ORAL at 12:01

## 2022-01-01 RX ADMIN — GABAPENTIN 100 MILLIGRAM(S): 400 CAPSULE ORAL at 06:06

## 2022-01-01 RX ADMIN — OXYCODONE HYDROCHLORIDE 10 MILLIGRAM(S): 5 TABLET ORAL at 08:51

## 2022-01-01 RX ADMIN — Medication 25 MICROGRAM(S): at 06:54

## 2022-01-01 RX ADMIN — Medication 30 MILLILITER(S): at 11:26

## 2022-01-01 RX ADMIN — SACUBITRIL AND VALSARTAN 1 TABLET(S): 24; 26 TABLET, FILM COATED ORAL at 17:38

## 2022-01-01 RX ADMIN — Medication 50 MILLIGRAM(S): at 06:38

## 2022-01-01 RX ADMIN — Medication 650 MILLIGRAM(S): at 18:43

## 2022-01-01 RX ADMIN — Medication 3 CAPSULE(S): at 16:40

## 2022-01-01 RX ADMIN — Medication 25 MICROGRAM(S): at 05:01

## 2022-01-01 RX ADMIN — SACUBITRIL AND VALSARTAN 1 TABLET(S): 24; 26 TABLET, FILM COATED ORAL at 18:44

## 2022-01-01 RX ADMIN — Medication 650 MILLIGRAM(S): at 19:21

## 2022-01-01 RX ADMIN — ENOXAPARIN SODIUM 85 MILLIGRAM(S): 100 INJECTION SUBCUTANEOUS at 17:02

## 2022-01-01 RX ADMIN — PANTOPRAZOLE SODIUM 40 MILLIGRAM(S): 20 TABLET, DELAYED RELEASE ORAL at 05:54

## 2022-01-01 RX ADMIN — Medication 0.25 MILLIGRAM(S): at 20:48

## 2022-01-01 RX ADMIN — Medication 250 MILLIGRAM(S): at 19:00

## 2022-01-01 RX ADMIN — GABAPENTIN 100 MILLIGRAM(S): 400 CAPSULE ORAL at 05:59

## 2022-01-01 RX ADMIN — TRAMADOL HYDROCHLORIDE 25 MILLIGRAM(S): 50 TABLET ORAL at 21:21

## 2022-01-01 RX ADMIN — Medication 400 MILLIGRAM(S): at 01:43

## 2022-01-01 RX ADMIN — PANTOPRAZOLE SODIUM 40 MILLIGRAM(S): 20 TABLET, DELAYED RELEASE ORAL at 06:06

## 2022-01-01 RX ADMIN — TAMSULOSIN HYDROCHLORIDE 0.4 MILLIGRAM(S): 0.4 CAPSULE ORAL at 21:38

## 2022-01-01 RX ADMIN — AMIODARONE HYDROCHLORIDE 200 MILLIGRAM(S): 400 TABLET ORAL at 06:17

## 2022-01-01 RX ADMIN — SIMETHICONE 80 MILLIGRAM(S): 80 TABLET, CHEWABLE ORAL at 21:32

## 2022-01-01 RX ADMIN — SACUBITRIL AND VALSARTAN 1 TABLET(S): 24; 26 TABLET, FILM COATED ORAL at 06:22

## 2022-01-01 RX ADMIN — Medication 400 MILLIGRAM(S): at 17:05

## 2022-01-01 RX ADMIN — Medication 3 CAPSULE(S): at 17:50

## 2022-01-01 RX ADMIN — Medication 3 CAPSULE(S): at 11:45

## 2022-01-01 RX ADMIN — ONDANSETRON 4 MILLIGRAM(S): 8 TABLET, FILM COATED ORAL at 19:48

## 2022-01-01 RX ADMIN — Medication 0.25 MILLIGRAM(S): at 13:01

## 2022-01-01 RX ADMIN — PIPERACILLIN AND TAZOBACTAM 25 GRAM(S): 4; .5 INJECTION, POWDER, LYOPHILIZED, FOR SOLUTION INTRAVENOUS at 13:27

## 2022-01-01 RX ADMIN — Medication 25 MICROGRAM(S): at 06:00

## 2022-01-01 RX ADMIN — GABAPENTIN 100 MILLIGRAM(S): 400 CAPSULE ORAL at 06:12

## 2022-01-01 RX ADMIN — PANTOPRAZOLE SODIUM 40 MILLIGRAM(S): 20 TABLET, DELAYED RELEASE ORAL at 05:24

## 2022-01-01 RX ADMIN — Medication 125 MILLILITER(S): at 06:52

## 2022-01-01 RX ADMIN — Medication 650 MILLIGRAM(S): at 18:45

## 2022-01-01 RX ADMIN — Medication 100 GRAM(S): at 10:47

## 2022-01-01 RX ADMIN — AMIODARONE HYDROCHLORIDE 200 MILLIGRAM(S): 400 TABLET ORAL at 05:16

## 2022-01-01 RX ADMIN — Medication 3 CAPSULE(S): at 12:51

## 2022-01-01 RX ADMIN — Medication 30 MILLILITER(S): at 11:46

## 2022-01-01 RX ADMIN — Medication 650 MILLIGRAM(S): at 05:59

## 2022-01-01 RX ADMIN — PANTOPRAZOLE SODIUM 40 MILLIGRAM(S): 20 TABLET, DELAYED RELEASE ORAL at 06:03

## 2022-01-01 RX ADMIN — Medication 30 MILLILITER(S): at 06:12

## 2022-01-01 RX ADMIN — Medication 650 MILLIGRAM(S): at 17:00

## 2022-01-01 RX ADMIN — TAMSULOSIN HYDROCHLORIDE 0.4 MILLIGRAM(S): 0.4 CAPSULE ORAL at 22:17

## 2022-01-01 RX ADMIN — GABAPENTIN 100 MILLIGRAM(S): 400 CAPSULE ORAL at 06:37

## 2022-01-01 RX ADMIN — ONDANSETRON 4 MILLIGRAM(S): 8 TABLET, FILM COATED ORAL at 00:53

## 2022-01-01 RX ADMIN — PANTOPRAZOLE SODIUM 40 MILLIGRAM(S): 20 TABLET, DELAYED RELEASE ORAL at 05:35

## 2022-01-01 RX ADMIN — Medication 20 MILLIGRAM(S): at 15:04

## 2022-01-01 RX ADMIN — ENOXAPARIN SODIUM 85 MILLIGRAM(S): 100 INJECTION SUBCUTANEOUS at 06:18

## 2022-01-01 RX ADMIN — Medication 650 MILLIGRAM(S): at 05:39

## 2022-01-01 RX ADMIN — Medication 25 MICROGRAM(S): at 06:03

## 2022-01-01 RX ADMIN — GABAPENTIN 100 MILLIGRAM(S): 400 CAPSULE ORAL at 22:01

## 2022-01-01 RX ADMIN — Medication 20 MILLIGRAM(S): at 06:08

## 2022-01-01 RX ADMIN — Medication 0.25 MILLIGRAM(S): at 01:26

## 2022-01-01 RX ADMIN — Medication 166.67 MILLIGRAM(S): at 06:43

## 2022-01-01 RX ADMIN — TAMSULOSIN HYDROCHLORIDE 0.4 MILLIGRAM(S): 0.4 CAPSULE ORAL at 21:18

## 2022-01-01 RX ADMIN — Medication 650 MILLIGRAM(S): at 07:30

## 2022-01-01 RX ADMIN — Medication 1 DROP(S): at 05:55

## 2022-01-01 RX ADMIN — GABAPENTIN 100 MILLIGRAM(S): 400 CAPSULE ORAL at 21:15

## 2022-01-01 RX ADMIN — GABAPENTIN 100 MILLIGRAM(S): 400 CAPSULE ORAL at 22:34

## 2022-01-01 RX ADMIN — GABAPENTIN 100 MILLIGRAM(S): 400 CAPSULE ORAL at 21:12

## 2022-01-01 RX ADMIN — Medication 0.25 MILLIGRAM(S): at 22:10

## 2022-01-01 RX ADMIN — OXYCODONE HYDROCHLORIDE 5 MILLIGRAM(S): 5 TABLET ORAL at 01:30

## 2022-01-01 RX ADMIN — Medication 3 CAPSULE(S): at 09:19

## 2022-01-01 RX ADMIN — Medication 650 MILLIGRAM(S): at 02:18

## 2022-01-01 RX ADMIN — Medication 1: at 17:15

## 2022-01-01 RX ADMIN — GABAPENTIN 100 MILLIGRAM(S): 400 CAPSULE ORAL at 06:22

## 2022-01-01 RX ADMIN — GABAPENTIN 100 MILLIGRAM(S): 400 CAPSULE ORAL at 15:03

## 2022-01-01 RX ADMIN — Medication 150 MEQ/KG/HR: at 05:27

## 2022-01-01 RX ADMIN — PIPERACILLIN AND TAZOBACTAM 25 GRAM(S): 4; .5 INJECTION, POWDER, LYOPHILIZED, FOR SOLUTION INTRAVENOUS at 08:27

## 2022-01-01 RX ADMIN — DULOXETINE HYDROCHLORIDE 60 MILLIGRAM(S): 30 CAPSULE, DELAYED RELEASE ORAL at 12:31

## 2022-01-01 RX ADMIN — Medication 25 MICROGRAM(S): at 12:36

## 2022-01-01 RX ADMIN — AMIODARONE HYDROCHLORIDE 200 MILLIGRAM(S): 400 TABLET ORAL at 06:23

## 2022-01-01 RX ADMIN — OXYCODONE HYDROCHLORIDE 5 MILLIGRAM(S): 5 TABLET ORAL at 07:50

## 2022-01-01 RX ADMIN — Medication 3 CAPSULE(S): at 14:08

## 2022-01-01 RX ADMIN — Medication 650 MILLIGRAM(S): at 00:24

## 2022-01-01 RX ADMIN — OXYCODONE HYDROCHLORIDE 5 MILLIGRAM(S): 5 TABLET ORAL at 17:24

## 2022-01-01 RX ADMIN — PANTOPRAZOLE SODIUM 40 MILLIGRAM(S): 20 TABLET, DELAYED RELEASE ORAL at 05:55

## 2022-01-01 RX ADMIN — Medication 650 MILLIGRAM(S): at 07:33

## 2022-01-01 RX ADMIN — RIVAROXABAN 15 MILLIGRAM(S): KIT at 06:39

## 2022-01-01 RX ADMIN — APIXABAN 5 MILLIGRAM(S): 2.5 TABLET, FILM COATED ORAL at 05:59

## 2022-01-01 RX ADMIN — BRIMONIDINE TARTRATE 1 DROP(S): 2 SOLUTION/ DROPS OPHTHALMIC at 06:43

## 2022-01-01 RX ADMIN — Medication 650 MILLIGRAM(S): at 00:37

## 2022-01-01 RX ADMIN — Medication 30 MILLILITER(S): at 17:16

## 2022-01-01 RX ADMIN — Medication 10 MILLIGRAM(S): at 16:34

## 2022-01-01 RX ADMIN — Medication 3 CAPSULE(S): at 07:20

## 2022-01-01 RX ADMIN — FENTANYL CITRATE 50 MICROGRAM(S): 50 INJECTION INTRAVENOUS at 11:10

## 2022-01-01 RX ADMIN — TRAMADOL HYDROCHLORIDE 25 MILLIGRAM(S): 50 TABLET ORAL at 21:38

## 2022-01-01 RX ADMIN — SODIUM CHLORIDE 500 MILLILITER(S): 9 INJECTION INTRAMUSCULAR; INTRAVENOUS; SUBCUTANEOUS at 03:22

## 2022-01-01 RX ADMIN — Medication 3 MILLIGRAM(S): at 02:19

## 2022-01-01 RX ADMIN — AMIODARONE HYDROCHLORIDE 200 MILLIGRAM(S): 400 TABLET ORAL at 06:47

## 2022-01-01 RX ADMIN — GABAPENTIN 100 MILLIGRAM(S): 400 CAPSULE ORAL at 07:34

## 2022-01-01 RX ADMIN — Medication 1 DROP(S): at 13:26

## 2022-01-01 RX ADMIN — Medication 650 MILLIGRAM(S): at 06:00

## 2022-01-01 RX ADMIN — PANTOPRAZOLE SODIUM 40 MILLIGRAM(S): 20 TABLET, DELAYED RELEASE ORAL at 07:34

## 2022-01-01 RX ADMIN — Medication 30 MILLILITER(S): at 17:20

## 2022-01-01 RX ADMIN — ONDANSETRON 4 MILLIGRAM(S): 8 TABLET, FILM COATED ORAL at 01:09

## 2022-01-01 RX ADMIN — AMIODARONE HYDROCHLORIDE 200 MILLIGRAM(S): 400 TABLET ORAL at 06:16

## 2022-01-01 RX ADMIN — AMIODARONE HYDROCHLORIDE 200 MILLIGRAM(S): 400 TABLET ORAL at 06:42

## 2022-01-01 RX ADMIN — ENOXAPARIN SODIUM 85 MILLIGRAM(S): 100 INJECTION SUBCUTANEOUS at 18:18

## 2022-01-01 RX ADMIN — Medication 3 CAPSULE(S): at 12:27

## 2022-01-01 RX ADMIN — Medication 3 CAPSULE(S): at 07:39

## 2022-01-01 RX ADMIN — PROPOFOL 5.37 MICROGRAM(S)/KG/MIN: 10 INJECTION, EMULSION INTRAVENOUS at 08:15

## 2022-01-01 RX ADMIN — LIDOCAINE 1 PATCH: 4 CREAM TOPICAL at 07:03

## 2022-01-01 RX ADMIN — Medication 0.25 MILLIGRAM(S): at 22:20

## 2022-01-01 RX ADMIN — DULOXETINE HYDROCHLORIDE 60 MILLIGRAM(S): 30 CAPSULE, DELAYED RELEASE ORAL at 12:24

## 2022-01-01 RX ADMIN — Medication 3 CAPSULE(S): at 16:38

## 2022-01-01 RX ADMIN — DULOXETINE HYDROCHLORIDE 60 MILLIGRAM(S): 30 CAPSULE, DELAYED RELEASE ORAL at 11:16

## 2022-01-01 RX ADMIN — Medication 25 MICROGRAM(S): at 07:08

## 2022-01-01 RX ADMIN — Medication 3 CAPSULE(S): at 07:25

## 2022-01-01 RX ADMIN — Medication 3 CAPSULE(S): at 11:48

## 2022-01-01 RX ADMIN — PIPERACILLIN AND TAZOBACTAM 25 GRAM(S): 4; .5 INJECTION, POWDER, LYOPHILIZED, FOR SOLUTION INTRAVENOUS at 13:54

## 2022-01-01 RX ADMIN — GABAPENTIN 100 MILLIGRAM(S): 400 CAPSULE ORAL at 13:02

## 2022-01-01 RX ADMIN — TAMSULOSIN HYDROCHLORIDE 0.4 MILLIGRAM(S): 0.4 CAPSULE ORAL at 22:28

## 2022-01-01 RX ADMIN — Medication 1 TABLET(S): at 22:32

## 2022-01-01 RX ADMIN — PIPERACILLIN AND TAZOBACTAM 25 GRAM(S): 4; .5 INJECTION, POWDER, LYOPHILIZED, FOR SOLUTION INTRAVENOUS at 05:35

## 2022-01-01 RX ADMIN — Medication 0.25 MILLIGRAM(S): at 23:10

## 2022-01-01 RX ADMIN — Medication 650 MILLIGRAM(S): at 06:37

## 2022-01-01 RX ADMIN — Medication 100 GRAM(S): at 12:18

## 2022-01-01 RX ADMIN — Medication 650 MILLIGRAM(S): at 06:11

## 2022-01-01 RX ADMIN — GABAPENTIN 100 MILLIGRAM(S): 400 CAPSULE ORAL at 23:03

## 2022-01-01 RX ADMIN — Medication 20 MILLIGRAM(S): at 14:04

## 2022-01-01 RX ADMIN — PIPERACILLIN AND TAZOBACTAM 25 GRAM(S): 4; .5 INJECTION, POWDER, LYOPHILIZED, FOR SOLUTION INTRAVENOUS at 21:34

## 2022-01-01 RX ADMIN — Medication 650 MILLIGRAM(S): at 18:35

## 2022-01-01 RX ADMIN — TRAMADOL HYDROCHLORIDE 25 MILLIGRAM(S): 50 TABLET ORAL at 21:40

## 2022-01-01 RX ADMIN — ONDANSETRON 4 MILLIGRAM(S): 8 TABLET, FILM COATED ORAL at 03:39

## 2022-01-01 RX ADMIN — Medication 650 MILLIGRAM(S): at 00:51

## 2022-01-01 RX ADMIN — DULOXETINE HYDROCHLORIDE 60 MILLIGRAM(S): 30 CAPSULE, DELAYED RELEASE ORAL at 11:18

## 2022-01-01 RX ADMIN — CHLORHEXIDINE GLUCONATE 1 APPLICATION(S): 213 SOLUTION TOPICAL at 14:42

## 2022-01-01 RX ADMIN — TAMSULOSIN HYDROCHLORIDE 0.4 MILLIGRAM(S): 0.4 CAPSULE ORAL at 22:08

## 2022-01-01 RX ADMIN — GABAPENTIN 100 MILLIGRAM(S): 400 CAPSULE ORAL at 14:43

## 2022-01-01 RX ADMIN — AMIODARONE HYDROCHLORIDE 200 MILLIGRAM(S): 400 TABLET ORAL at 05:15

## 2022-01-01 RX ADMIN — Medication 0.25 MILLIGRAM(S): at 19:14

## 2022-01-01 RX ADMIN — Medication 3 CAPSULE(S): at 12:15

## 2022-01-01 RX ADMIN — Medication 100 GRAM(S): at 21:38

## 2022-01-01 RX ADMIN — Medication 650 MILLIGRAM(S): at 08:25

## 2022-01-01 RX ADMIN — AMIODARONE HYDROCHLORIDE 200 MILLIGRAM(S): 400 TABLET ORAL at 06:12

## 2022-01-01 RX ADMIN — DULOXETINE HYDROCHLORIDE 60 MILLIGRAM(S): 30 CAPSULE, DELAYED RELEASE ORAL at 11:29

## 2022-01-01 RX ADMIN — SODIUM CHLORIDE 250 MILLILITER(S): 9 INJECTION, SOLUTION INTRAVENOUS at 09:43

## 2022-01-01 RX ADMIN — LIDOCAINE 1 PATCH: 4 CREAM TOPICAL at 10:59

## 2022-01-01 RX ADMIN — BRIMONIDINE TARTRATE 1 DROP(S): 2 SOLUTION/ DROPS OPHTHALMIC at 17:17

## 2022-01-01 RX ADMIN — Medication 0.25 MILLIGRAM(S): at 23:48

## 2022-01-01 RX ADMIN — HEPARIN SODIUM 1600 UNIT(S)/HR: 5000 INJECTION INTRAVENOUS; SUBCUTANEOUS at 18:29

## 2022-01-01 RX ADMIN — GABAPENTIN 100 MILLIGRAM(S): 400 CAPSULE ORAL at 21:21

## 2022-01-01 RX ADMIN — Medication 650 MILLIGRAM(S): at 12:31

## 2022-01-01 RX ADMIN — Medication 0.25 MILLIGRAM(S): at 21:25

## 2022-01-01 RX ADMIN — GABAPENTIN 100 MILLIGRAM(S): 400 CAPSULE ORAL at 21:41

## 2022-01-01 RX ADMIN — SACUBITRIL AND VALSARTAN 1 TABLET(S): 24; 26 TABLET, FILM COATED ORAL at 06:12

## 2022-01-01 RX ADMIN — Medication 10 MILLIGRAM(S): at 09:31

## 2022-01-01 RX ADMIN — OXYCODONE HYDROCHLORIDE 10 MILLIGRAM(S): 5 TABLET ORAL at 10:40

## 2022-01-01 RX ADMIN — CEFEPIME 100 MILLIGRAM(S): 1 INJECTION, POWDER, FOR SOLUTION INTRAMUSCULAR; INTRAVENOUS at 05:56

## 2022-01-01 RX ADMIN — Medication 650 MILLIGRAM(S): at 06:06

## 2022-01-01 RX ADMIN — Medication 650 MILLIGRAM(S): at 23:47

## 2022-01-01 RX ADMIN — BRIMONIDINE TARTRATE 1 DROP(S): 2 SOLUTION/ DROPS OPHTHALMIC at 17:57

## 2022-01-01 RX ADMIN — MIDODRINE HYDROCHLORIDE 5 MILLIGRAM(S): 2.5 TABLET ORAL at 13:01

## 2022-01-01 RX ADMIN — Medication 3 MILLIGRAM(S): at 23:16

## 2022-01-01 RX ADMIN — AMIODARONE HYDROCHLORIDE 200 MILLIGRAM(S): 400 TABLET ORAL at 07:36

## 2022-01-01 RX ADMIN — OXYCODONE HYDROCHLORIDE 5 MILLIGRAM(S): 5 TABLET ORAL at 00:38

## 2022-01-01 RX ADMIN — PANTOPRAZOLE SODIUM 40 MILLIGRAM(S): 20 TABLET, DELAYED RELEASE ORAL at 05:49

## 2022-01-01 RX ADMIN — Medication 400 MILLIGRAM(S): at 10:07

## 2022-01-01 RX ADMIN — Medication 150 MEQ/KG/HR: at 16:43

## 2022-01-01 RX ADMIN — Medication 500 MILLIGRAM(S): at 11:29

## 2022-01-01 RX ADMIN — SACUBITRIL AND VALSARTAN 1 TABLET(S): 24; 26 TABLET, FILM COATED ORAL at 19:36

## 2022-01-01 RX ADMIN — ONDANSETRON 4 MILLIGRAM(S): 8 TABLET, FILM COATED ORAL at 02:58

## 2022-01-01 RX ADMIN — HEPARIN SODIUM 5000 UNIT(S): 5000 INJECTION INTRAVENOUS; SUBCUTANEOUS at 06:32

## 2022-01-01 RX ADMIN — Medication 1 DROP(S): at 06:13

## 2022-01-01 RX ADMIN — GABAPENTIN 100 MILLIGRAM(S): 400 CAPSULE ORAL at 21:04

## 2022-01-01 RX ADMIN — RIVAROXABAN 20 MILLIGRAM(S): KIT at 17:57

## 2022-01-01 RX ADMIN — AMIODARONE HYDROCHLORIDE 200 MILLIGRAM(S): 400 TABLET ORAL at 07:34

## 2022-01-01 RX ADMIN — GABAPENTIN 100 MILLIGRAM(S): 400 CAPSULE ORAL at 06:41

## 2022-01-01 RX ADMIN — Medication 3 CAPSULE(S): at 08:24

## 2022-01-01 RX ADMIN — HEPARIN SODIUM 5000 UNIT(S): 5000 INJECTION INTRAVENOUS; SUBCUTANEOUS at 05:58

## 2022-01-01 RX ADMIN — ONDANSETRON 4 MILLIGRAM(S): 8 TABLET, FILM COATED ORAL at 23:55

## 2022-01-01 RX ADMIN — Medication 400 MILLIGRAM(S): at 00:43

## 2022-01-01 RX ADMIN — Medication 3 CAPSULE(S): at 17:28

## 2022-01-01 RX ADMIN — DULOXETINE HYDROCHLORIDE 60 MILLIGRAM(S): 30 CAPSULE, DELAYED RELEASE ORAL at 12:36

## 2022-01-01 RX ADMIN — Medication 50 MILLIGRAM(S): at 06:12

## 2022-01-01 RX ADMIN — AMIODARONE HYDROCHLORIDE 200 MILLIGRAM(S): 400 TABLET ORAL at 07:07

## 2022-01-01 RX ADMIN — Medication 650 MILLIGRAM(S): at 06:58

## 2022-01-01 RX ADMIN — ENOXAPARIN SODIUM 85 MILLIGRAM(S): 100 INJECTION SUBCUTANEOUS at 17:22

## 2022-01-01 RX ADMIN — SODIUM CHLORIDE 1000 MILLILITER(S): 9 INJECTION INTRAMUSCULAR; INTRAVENOUS; SUBCUTANEOUS at 17:41

## 2022-01-01 RX ADMIN — PIPERACILLIN AND TAZOBACTAM 25 GRAM(S): 4; .5 INJECTION, POWDER, LYOPHILIZED, FOR SOLUTION INTRAVENOUS at 13:59

## 2022-01-01 RX ADMIN — ONDANSETRON 4 MILLIGRAM(S): 8 TABLET, FILM COATED ORAL at 12:14

## 2022-01-01 RX ADMIN — Medication 650 MILLIGRAM(S): at 23:56

## 2022-01-01 RX ADMIN — PIPERACILLIN AND TAZOBACTAM 25 GRAM(S): 4; .5 INJECTION, POWDER, LYOPHILIZED, FOR SOLUTION INTRAVENOUS at 22:08

## 2022-01-01 RX ADMIN — HEPARIN SODIUM 3500 UNIT(S): 5000 INJECTION INTRAVENOUS; SUBCUTANEOUS at 19:42

## 2022-01-01 RX ADMIN — OXYCODONE HYDROCHLORIDE 5 MILLIGRAM(S): 5 TABLET ORAL at 10:28

## 2022-01-01 RX ADMIN — TAMSULOSIN HYDROCHLORIDE 0.4 MILLIGRAM(S): 0.4 CAPSULE ORAL at 21:09

## 2022-01-01 RX ADMIN — DULOXETINE HYDROCHLORIDE 60 MILLIGRAM(S): 30 CAPSULE, DELAYED RELEASE ORAL at 12:51

## 2022-01-01 RX ADMIN — Medication 650 MILLIGRAM(S): at 07:08

## 2022-01-01 RX ADMIN — Medication 1 TABLET(S): at 11:29

## 2022-01-01 RX ADMIN — Medication 20 MILLIGRAM(S): at 06:12

## 2022-01-01 RX ADMIN — DULOXETINE HYDROCHLORIDE 60 MILLIGRAM(S): 30 CAPSULE, DELAYED RELEASE ORAL at 14:12

## 2022-01-01 RX ADMIN — LIDOCAINE 1 PATCH: 4 CREAM TOPICAL at 22:28

## 2022-01-01 RX ADMIN — GABAPENTIN 100 MILLIGRAM(S): 400 CAPSULE ORAL at 14:24

## 2022-01-01 RX ADMIN — Medication 1: at 11:56

## 2022-01-01 RX ADMIN — SODIUM CHLORIDE 83.33 MILLILITER(S): 9 INJECTION INTRAMUSCULAR; INTRAVENOUS; SUBCUTANEOUS at 17:57

## 2022-01-01 RX ADMIN — Medication 3 MILLIGRAM(S): at 01:43

## 2022-01-01 RX ADMIN — Medication 650 MILLIGRAM(S): at 12:56

## 2022-01-01 RX ADMIN — Medication 650 MILLIGRAM(S): at 17:18

## 2022-01-01 RX ADMIN — Medication 3 CAPSULE(S): at 08:02

## 2022-01-01 RX ADMIN — CHLORHEXIDINE GLUCONATE 1 APPLICATION(S): 213 SOLUTION TOPICAL at 06:57

## 2022-01-01 RX ADMIN — Medication 650 MILLIGRAM(S): at 13:21

## 2022-01-01 RX ADMIN — MIDODRINE HYDROCHLORIDE 10 MILLIGRAM(S): 2.5 TABLET ORAL at 05:58

## 2022-01-01 RX ADMIN — SODIUM CHLORIDE 100 MILLILITER(S): 9 INJECTION, SOLUTION INTRAVENOUS at 16:42

## 2022-01-01 RX ADMIN — DULOXETINE HYDROCHLORIDE 60 MILLIGRAM(S): 30 CAPSULE, DELAYED RELEASE ORAL at 11:55

## 2022-01-01 RX ADMIN — HEPARIN SODIUM 5000 UNIT(S): 5000 INJECTION INTRAVENOUS; SUBCUTANEOUS at 18:46

## 2022-01-01 RX ADMIN — Medication 125 MILLILITER(S): at 22:29

## 2022-01-01 RX ADMIN — GABAPENTIN 100 MILLIGRAM(S): 400 CAPSULE ORAL at 21:47

## 2022-01-01 RX ADMIN — Medication 25 MICROGRAM(S): at 05:24

## 2022-01-01 RX ADMIN — LATANOPROST 1 DROP(S): 0.05 SOLUTION/ DROPS OPHTHALMIC; TOPICAL at 21:11

## 2022-01-01 RX ADMIN — Medication 1250 MILLIGRAM(S): at 07:50

## 2022-01-01 RX ADMIN — TRAMADOL HYDROCHLORIDE 25 MILLIGRAM(S): 50 TABLET ORAL at 10:20

## 2022-01-01 RX ADMIN — CHLORHEXIDINE GLUCONATE 1 APPLICATION(S): 213 SOLUTION TOPICAL at 13:40

## 2022-01-01 RX ADMIN — QUETIAPINE FUMARATE 25 MILLIGRAM(S): 200 TABLET, FILM COATED ORAL at 22:51

## 2022-01-01 RX ADMIN — AMIODARONE HYDROCHLORIDE 200 MILLIGRAM(S): 400 TABLET ORAL at 07:04

## 2022-01-01 RX ADMIN — PANTOPRAZOLE SODIUM 40 MILLIGRAM(S): 20 TABLET, DELAYED RELEASE ORAL at 07:05

## 2022-01-01 RX ADMIN — SIMETHICONE 80 MILLIGRAM(S): 80 TABLET, CHEWABLE ORAL at 13:00

## 2022-01-01 RX ADMIN — Medication 50 MILLIGRAM(S): at 05:39

## 2022-01-01 RX ADMIN — Medication 400 MILLIGRAM(S): at 21:06

## 2022-01-01 RX ADMIN — GABAPENTIN 100 MILLIGRAM(S): 400 CAPSULE ORAL at 05:08

## 2022-01-01 RX ADMIN — PANTOPRAZOLE SODIUM 40 MILLIGRAM(S): 20 TABLET, DELAYED RELEASE ORAL at 06:43

## 2022-01-01 RX ADMIN — Medication 400 MILLIGRAM(S): at 09:42

## 2022-01-01 RX ADMIN — HEPARIN SODIUM 5000 UNIT(S): 5000 INJECTION INTRAVENOUS; SUBCUTANEOUS at 18:38

## 2022-01-01 RX ADMIN — SACUBITRIL AND VALSARTAN 1 TABLET(S): 24; 26 TABLET, FILM COATED ORAL at 10:46

## 2022-01-01 RX ADMIN — RIVAROXABAN 20 MILLIGRAM(S): KIT at 21:18

## 2022-01-01 RX ADMIN — LATANOPROST 1 DROP(S): 0.05 SOLUTION/ DROPS OPHTHALMIC; TOPICAL at 23:43

## 2022-01-01 RX ADMIN — SIMETHICONE 80 MILLIGRAM(S): 80 TABLET, CHEWABLE ORAL at 18:43

## 2022-01-01 RX ADMIN — GABAPENTIN 100 MILLIGRAM(S): 400 CAPSULE ORAL at 06:08

## 2022-01-01 RX ADMIN — Medication 1 DROP(S): at 17:19

## 2022-01-01 RX ADMIN — Medication 650 MILLIGRAM(S): at 11:32

## 2022-01-01 RX ADMIN — OXYCODONE HYDROCHLORIDE 5 MILLIGRAM(S): 5 TABLET ORAL at 18:54

## 2022-01-01 RX ADMIN — TAMSULOSIN HYDROCHLORIDE 0.4 MILLIGRAM(S): 0.4 CAPSULE ORAL at 21:23

## 2022-01-01 RX ADMIN — GABAPENTIN 100 MILLIGRAM(S): 400 CAPSULE ORAL at 22:11

## 2022-01-01 RX ADMIN — PIPERACILLIN AND TAZOBACTAM 25 GRAM(S): 4; .5 INJECTION, POWDER, LYOPHILIZED, FOR SOLUTION INTRAVENOUS at 17:02

## 2022-01-01 RX ADMIN — Medication 20 MILLIGRAM(S): at 05:39

## 2022-01-01 RX ADMIN — Medication 30 MILLILITER(S): at 17:02

## 2022-01-01 RX ADMIN — DULOXETINE HYDROCHLORIDE 60 MILLIGRAM(S): 30 CAPSULE, DELAYED RELEASE ORAL at 11:50

## 2022-01-01 RX ADMIN — SODIUM CHLORIDE 125 MILLILITER(S): 9 INJECTION INTRAMUSCULAR; INTRAVENOUS; SUBCUTANEOUS at 03:07

## 2022-01-01 RX ADMIN — OXYCODONE HYDROCHLORIDE 5 MILLIGRAM(S): 5 TABLET ORAL at 11:30

## 2022-01-01 RX ADMIN — LATANOPROST 1 DROP(S): 0.05 SOLUTION/ DROPS OPHTHALMIC; TOPICAL at 21:18

## 2022-01-01 RX ADMIN — SACUBITRIL AND VALSARTAN 1 TABLET(S): 24; 26 TABLET, FILM COATED ORAL at 06:37

## 2022-01-01 RX ADMIN — AMIODARONE HYDROCHLORIDE 200 MILLIGRAM(S): 400 TABLET ORAL at 06:13

## 2022-01-01 RX ADMIN — AMIODARONE HYDROCHLORIDE 200 MILLIGRAM(S): 400 TABLET ORAL at 05:39

## 2022-01-01 RX ADMIN — Medication 650 MILLIGRAM(S): at 05:24

## 2022-01-01 RX ADMIN — Medication 650 MILLIGRAM(S): at 23:49

## 2022-01-01 RX ADMIN — HEPARIN SODIUM 1000 UNIT(S)/HR: 5000 INJECTION INTRAVENOUS; SUBCUTANEOUS at 18:16

## 2022-01-01 RX ADMIN — CHLORHEXIDINE GLUCONATE 1 APPLICATION(S): 213 SOLUTION TOPICAL at 06:08

## 2022-01-01 RX ADMIN — HEPARIN SODIUM 1300 UNIT(S)/HR: 5000 INJECTION INTRAVENOUS; SUBCUTANEOUS at 08:19

## 2022-01-01 RX ADMIN — OXYCODONE HYDROCHLORIDE 10 MILLIGRAM(S): 5 TABLET ORAL at 11:13

## 2022-01-01 RX ADMIN — DULOXETINE HYDROCHLORIDE 60 MILLIGRAM(S): 30 CAPSULE, DELAYED RELEASE ORAL at 11:47

## 2022-01-01 RX ADMIN — Medication 30 MILLILITER(S): at 17:15

## 2022-01-01 RX ADMIN — PIPERACILLIN AND TAZOBACTAM 25 GRAM(S): 4; .5 INJECTION, POWDER, LYOPHILIZED, FOR SOLUTION INTRAVENOUS at 14:43

## 2022-01-01 RX ADMIN — Medication 50 MILLIGRAM(S): at 06:43

## 2022-01-01 RX ADMIN — TRAMADOL HYDROCHLORIDE 25 MILLIGRAM(S): 50 TABLET ORAL at 22:11

## 2022-01-01 RX ADMIN — AMIODARONE HYDROCHLORIDE 200 MILLIGRAM(S): 400 TABLET ORAL at 05:24

## 2022-01-01 RX ADMIN — PIPERACILLIN AND TAZOBACTAM 25 GRAM(S): 4; .5 INJECTION, POWDER, LYOPHILIZED, FOR SOLUTION INTRAVENOUS at 21:40

## 2022-01-01 RX ADMIN — Medication 650 MILLIGRAM(S): at 11:56

## 2022-01-01 RX ADMIN — DULOXETINE HYDROCHLORIDE 60 MILLIGRAM(S): 30 CAPSULE, DELAYED RELEASE ORAL at 12:08

## 2022-01-01 RX ADMIN — Medication 3 MILLIGRAM(S): at 21:23

## 2022-01-01 RX ADMIN — Medication 0.25 MILLIGRAM(S): at 22:17

## 2022-01-01 RX ADMIN — Medication 400 MILLIGRAM(S): at 21:19

## 2022-01-01 RX ADMIN — SODIUM CHLORIDE 100 MILLILITER(S): 9 INJECTION INTRAMUSCULAR; INTRAVENOUS; SUBCUTANEOUS at 10:41

## 2022-01-01 RX ADMIN — Medication 650 MILLIGRAM(S): at 02:05

## 2022-01-01 RX ADMIN — TAMSULOSIN HYDROCHLORIDE 0.4 MILLIGRAM(S): 0.4 CAPSULE ORAL at 21:16

## 2022-01-01 RX ADMIN — KETAMINE HYDROCHLORIDE 17.9 MG/KG/HR: 100 INJECTION INTRAMUSCULAR; INTRAVENOUS at 05:27

## 2022-01-01 RX ADMIN — ONDANSETRON 4 MILLIGRAM(S): 8 TABLET, FILM COATED ORAL at 16:42

## 2022-01-01 RX ADMIN — Medication 3 CAPSULE(S): at 12:18

## 2022-01-01 RX ADMIN — TAMSULOSIN HYDROCHLORIDE 0.4 MILLIGRAM(S): 0.4 CAPSULE ORAL at 22:34

## 2022-01-01 RX ADMIN — GABAPENTIN 100 MILLIGRAM(S): 400 CAPSULE ORAL at 13:05

## 2022-01-01 RX ADMIN — Medication 3 CAPSULE(S): at 07:53

## 2022-01-01 RX ADMIN — Medication 0.25 MILLIGRAM(S): at 21:21

## 2022-01-01 RX ADMIN — PIPERACILLIN AND TAZOBACTAM 25 GRAM(S): 4; .5 INJECTION, POWDER, LYOPHILIZED, FOR SOLUTION INTRAVENOUS at 05:31

## 2022-01-01 RX ADMIN — Medication 400 MILLIGRAM(S): at 16:56

## 2022-01-01 RX ADMIN — Medication 3 CAPSULE(S): at 07:36

## 2022-01-01 RX ADMIN — Medication 650 MILLIGRAM(S): at 00:18

## 2022-01-01 RX ADMIN — TAMSULOSIN HYDROCHLORIDE 0.4 MILLIGRAM(S): 0.4 CAPSULE ORAL at 21:15

## 2022-01-01 RX ADMIN — OXYCODONE HYDROCHLORIDE 5 MILLIGRAM(S): 5 TABLET ORAL at 13:26

## 2022-01-01 RX ADMIN — OXYCODONE HYDROCHLORIDE 5 MILLIGRAM(S): 5 TABLET ORAL at 08:57

## 2022-01-01 RX ADMIN — PANTOPRAZOLE SODIUM 40 MILLIGRAM(S): 20 TABLET, DELAYED RELEASE ORAL at 06:41

## 2022-01-01 RX ADMIN — Medication 40 MILLIEQUIVALENT(S): at 16:06

## 2022-01-01 RX ADMIN — SACUBITRIL AND VALSARTAN 1 TABLET(S): 24; 26 TABLET, FILM COATED ORAL at 17:06

## 2022-01-01 RX ADMIN — AMIODARONE HYDROCHLORIDE 200 MILLIGRAM(S): 400 TABLET ORAL at 08:01

## 2022-01-01 RX ADMIN — Medication 650 MILLIGRAM(S): at 22:30

## 2022-01-01 RX ADMIN — TAMSULOSIN HYDROCHLORIDE 0.4 MILLIGRAM(S): 0.4 CAPSULE ORAL at 22:21

## 2022-01-01 RX ADMIN — SACUBITRIL AND VALSARTAN 1 TABLET(S): 24; 26 TABLET, FILM COATED ORAL at 18:28

## 2022-01-01 RX ADMIN — GABAPENTIN 100 MILLIGRAM(S): 400 CAPSULE ORAL at 13:21

## 2022-01-01 RX ADMIN — Medication 650 MILLIGRAM(S): at 12:09

## 2022-01-01 RX ADMIN — PHENYLEPHRINE HYDROCHLORIDE 109 MICROGRAM(S)/KG/MIN: 10 INJECTION INTRAVENOUS at 18:18

## 2022-01-01 RX ADMIN — DULOXETINE HYDROCHLORIDE 60 MILLIGRAM(S): 30 CAPSULE, DELAYED RELEASE ORAL at 18:18

## 2022-01-01 RX ADMIN — Medication 30 MILLILITER(S): at 11:50

## 2022-01-01 RX ADMIN — Medication 400 MILLIGRAM(S): at 16:06

## 2022-01-01 RX ADMIN — Medication 3 CAPSULE(S): at 11:47

## 2022-01-01 RX ADMIN — ONDANSETRON 8 MILLIGRAM(S): 8 TABLET, FILM COATED ORAL at 06:17

## 2022-01-01 RX ADMIN — Medication 5 MILLIGRAM(S): at 20:10

## 2022-01-01 RX ADMIN — Medication 3 CAPSULE(S): at 13:00

## 2022-01-01 RX ADMIN — Medication 2: at 11:54

## 2022-01-01 RX ADMIN — Medication 3 CAPSULE(S): at 16:59

## 2022-01-01 RX ADMIN — Medication 3 CAPSULE(S): at 13:39

## 2022-01-01 RX ADMIN — Medication 650 MILLIGRAM(S): at 06:16

## 2022-01-01 RX ADMIN — FENTANYL CITRATE 50 MICROGRAM(S): 50 INJECTION INTRAVENOUS at 10:51

## 2022-01-01 RX ADMIN — Medication 400 MILLIGRAM(S): at 21:25

## 2022-01-01 RX ADMIN — PIPERACILLIN AND TAZOBACTAM 25 GRAM(S): 4; .5 INJECTION, POWDER, LYOPHILIZED, FOR SOLUTION INTRAVENOUS at 05:17

## 2022-01-01 RX ADMIN — GABAPENTIN 100 MILLIGRAM(S): 400 CAPSULE ORAL at 15:41

## 2022-01-01 RX ADMIN — TAMSULOSIN HYDROCHLORIDE 0.4 MILLIGRAM(S): 0.4 CAPSULE ORAL at 21:31

## 2022-01-01 RX ADMIN — Medication 650 MILLIGRAM(S): at 17:07

## 2022-01-01 RX ADMIN — Medication 0.25 MILLIGRAM(S): at 22:47

## 2022-01-01 RX ADMIN — Medication 650 MILLIGRAM(S): at 18:12

## 2022-01-01 RX ADMIN — GABAPENTIN 100 MILLIGRAM(S): 400 CAPSULE ORAL at 21:05

## 2022-01-01 RX ADMIN — Medication 3 CAPSULE(S): at 16:42

## 2022-01-01 RX ADMIN — Medication 650 MILLIGRAM(S): at 23:10

## 2022-01-01 RX ADMIN — RIVAROXABAN 20 MILLIGRAM(S): KIT at 17:16

## 2022-01-01 RX ADMIN — Medication 30 MILLILITER(S): at 05:15

## 2022-01-01 RX ADMIN — SACUBITRIL AND VALSARTAN 1 TABLET(S): 24; 26 TABLET, FILM COATED ORAL at 18:49

## 2022-01-01 RX ADMIN — TAMSULOSIN HYDROCHLORIDE 0.4 MILLIGRAM(S): 0.4 CAPSULE ORAL at 22:10

## 2022-01-01 RX ADMIN — QUETIAPINE FUMARATE 25 MILLIGRAM(S): 200 TABLET, FILM COATED ORAL at 22:22

## 2022-01-01 RX ADMIN — GABAPENTIN 100 MILLIGRAM(S): 400 CAPSULE ORAL at 22:27

## 2022-01-01 RX ADMIN — Medication 650 MILLIGRAM(S): at 19:27

## 2022-01-01 RX ADMIN — Medication 3 CAPSULE(S): at 18:14

## 2022-01-01 RX ADMIN — AMIODARONE HYDROCHLORIDE 200 MILLIGRAM(S): 400 TABLET ORAL at 07:25

## 2022-01-01 RX ADMIN — PIPERACILLIN AND TAZOBACTAM 25 GRAM(S): 4; .5 INJECTION, POWDER, LYOPHILIZED, FOR SOLUTION INTRAVENOUS at 21:21

## 2022-01-01 RX ADMIN — Medication 650 MILLIGRAM(S): at 00:46

## 2022-01-01 RX ADMIN — Medication 25 MICROGRAM(S): at 06:06

## 2022-01-01 RX ADMIN — PIPERACILLIN AND TAZOBACTAM 25 GRAM(S): 4; .5 INJECTION, POWDER, LYOPHILIZED, FOR SOLUTION INTRAVENOUS at 05:51

## 2022-01-01 RX ADMIN — GABAPENTIN 100 MILLIGRAM(S): 400 CAPSULE ORAL at 10:40

## 2022-01-01 RX ADMIN — PIPERACILLIN AND TAZOBACTAM 25 GRAM(S): 4; .5 INJECTION, POWDER, LYOPHILIZED, FOR SOLUTION INTRAVENOUS at 05:54

## 2022-01-01 RX ADMIN — Medication 650 MILLIGRAM(S): at 05:48

## 2022-01-01 RX ADMIN — Medication 0.25 MILLIGRAM(S): at 22:13

## 2022-01-01 RX ADMIN — PANTOPRAZOLE SODIUM 40 MILLIGRAM(S): 20 TABLET, DELAYED RELEASE ORAL at 06:38

## 2022-01-01 RX ADMIN — OXYCODONE HYDROCHLORIDE 10 MILLIGRAM(S): 5 TABLET ORAL at 11:20

## 2022-01-01 RX ADMIN — PIPERACILLIN AND TAZOBACTAM 200 GRAM(S): 4; .5 INJECTION, POWDER, LYOPHILIZED, FOR SOLUTION INTRAVENOUS at 00:53

## 2022-01-01 RX ADMIN — MAGNESIUM OXIDE 400 MG ORAL TABLET 400 MILLIGRAM(S): 241.3 TABLET ORAL at 16:48

## 2022-01-01 RX ADMIN — SACUBITRIL AND VALSARTAN 1 TABLET(S): 24; 26 TABLET, FILM COATED ORAL at 18:01

## 2022-01-01 RX ADMIN — PIPERACILLIN AND TAZOBACTAM 25 GRAM(S): 4; .5 INJECTION, POWDER, LYOPHILIZED, FOR SOLUTION INTRAVENOUS at 09:32

## 2022-01-01 RX ADMIN — Medication 3 CAPSULE(S): at 11:41

## 2022-01-01 RX ADMIN — BRIMONIDINE TARTRATE 1 DROP(S): 2 SOLUTION/ DROPS OPHTHALMIC at 05:55

## 2022-01-01 RX ADMIN — PANTOPRAZOLE SODIUM 40 MILLIGRAM(S): 20 TABLET, DELAYED RELEASE ORAL at 06:22

## 2022-01-01 RX ADMIN — Medication 20 MILLIGRAM(S): at 06:42

## 2022-01-01 RX ADMIN — MIDODRINE HYDROCHLORIDE 10 MILLIGRAM(S): 2.5 TABLET ORAL at 22:08

## 2022-01-01 RX ADMIN — Medication 3 CAPSULE(S): at 12:47

## 2022-01-01 RX ADMIN — Medication 650 MILLIGRAM(S): at 13:01

## 2022-01-01 RX ADMIN — AMIODARONE HYDROCHLORIDE 200 MILLIGRAM(S): 400 TABLET ORAL at 05:49

## 2022-01-01 RX ADMIN — MIDODRINE HYDROCHLORIDE 10 MILLIGRAM(S): 2.5 TABLET ORAL at 01:40

## 2022-01-01 RX ADMIN — OXYCODONE HYDROCHLORIDE 10 MILLIGRAM(S): 5 TABLET ORAL at 11:30

## 2022-01-01 RX ADMIN — Medication 650 MILLIGRAM(S): at 18:03

## 2022-01-01 RX ADMIN — Medication 0.25 MILLIGRAM(S): at 21:32

## 2022-01-01 RX ADMIN — Medication 3 CAPSULE(S): at 12:14

## 2022-01-01 RX ADMIN — Medication 3 CAPSULE(S): at 11:50

## 2022-01-01 RX ADMIN — Medication 3 CAPSULE(S): at 17:52

## 2022-01-01 RX ADMIN — AMIODARONE HYDROCHLORIDE 200 MILLIGRAM(S): 400 TABLET ORAL at 06:58

## 2022-01-01 RX ADMIN — RIVAROXABAN 15 MILLIGRAM(S): KIT at 18:03

## 2022-01-01 RX ADMIN — Medication 25 MICROGRAM(S): at 06:15

## 2022-01-01 RX ADMIN — ONDANSETRON 4 MILLIGRAM(S): 8 TABLET, FILM COATED ORAL at 01:54

## 2022-01-01 RX ADMIN — PIPERACILLIN AND TAZOBACTAM 3.38 GRAM(S): 4; .5 INJECTION, POWDER, LYOPHILIZED, FOR SOLUTION INTRAVENOUS at 05:45

## 2022-01-01 RX ADMIN — OXYCODONE HYDROCHLORIDE 5 MILLIGRAM(S): 5 TABLET ORAL at 14:03

## 2022-01-01 RX ADMIN — ENOXAPARIN SODIUM 85 MILLIGRAM(S): 100 INJECTION SUBCUTANEOUS at 07:04

## 2022-01-01 RX ADMIN — GABAPENTIN 100 MILLIGRAM(S): 400 CAPSULE ORAL at 21:23

## 2022-01-01 RX ADMIN — Medication 1 DROP(S): at 06:43

## 2022-01-01 RX ADMIN — PANTOPRAZOLE SODIUM 40 MILLIGRAM(S): 20 TABLET, DELAYED RELEASE ORAL at 06:08

## 2022-01-01 RX ADMIN — MIDODRINE HYDROCHLORIDE 10 MILLIGRAM(S): 2.5 TABLET ORAL at 21:36

## 2022-01-01 RX ADMIN — Medication 0.25 MILLIGRAM(S): at 02:08

## 2022-01-01 RX ADMIN — CHLORHEXIDINE GLUCONATE 15 MILLILITER(S): 213 SOLUTION TOPICAL at 18:30

## 2022-01-01 RX ADMIN — Medication 3 CAPSULE(S): at 11:29

## 2022-01-01 RX ADMIN — TAMSULOSIN HYDROCHLORIDE 0.4 MILLIGRAM(S): 0.4 CAPSULE ORAL at 21:41

## 2022-01-01 RX ADMIN — PIPERACILLIN AND TAZOBACTAM 25 GRAM(S): 4; .5 INJECTION, POWDER, LYOPHILIZED, FOR SOLUTION INTRAVENOUS at 13:53

## 2022-01-01 RX ADMIN — Medication 3 CAPSULE(S): at 14:48

## 2022-01-01 RX ADMIN — TAMSULOSIN HYDROCHLORIDE 0.4 MILLIGRAM(S): 0.4 CAPSULE ORAL at 22:12

## 2022-01-01 RX ADMIN — DULOXETINE HYDROCHLORIDE 60 MILLIGRAM(S): 30 CAPSULE, DELAYED RELEASE ORAL at 18:22

## 2022-01-01 RX ADMIN — BRIMONIDINE TARTRATE 1 DROP(S): 2 SOLUTION/ DROPS OPHTHALMIC at 17:05

## 2022-01-01 RX ADMIN — SIMETHICONE 80 MILLIGRAM(S): 80 TABLET, CHEWABLE ORAL at 19:08

## 2022-01-01 RX ADMIN — SODIUM CHLORIDE 100 MILLILITER(S): 9 INJECTION, SOLUTION INTRAVENOUS at 02:00

## 2022-01-01 RX ADMIN — TAMSULOSIN HYDROCHLORIDE 0.4 MILLIGRAM(S): 0.4 CAPSULE ORAL at 21:21

## 2022-01-01 RX ADMIN — Medication 0.25 MILLIGRAM(S): at 21:29

## 2022-01-01 RX ADMIN — TRAMADOL HYDROCHLORIDE 25 MILLIGRAM(S): 50 TABLET ORAL at 09:20

## 2022-01-01 RX ADMIN — DULOXETINE HYDROCHLORIDE 60 MILLIGRAM(S): 30 CAPSULE, DELAYED RELEASE ORAL at 12:48

## 2022-01-01 RX ADMIN — Medication 650 MILLIGRAM(S): at 06:32

## 2022-01-01 RX ADMIN — Medication 25 MICROGRAM(S): at 06:18

## 2022-01-01 RX ADMIN — Medication 3 CAPSULE(S): at 12:08

## 2022-01-01 RX ADMIN — Medication 30 MILLILITER(S): at 23:43

## 2022-01-01 RX ADMIN — Medication 50 MILLIGRAM(S): at 06:13

## 2022-01-01 RX ADMIN — PANTOPRAZOLE SODIUM 40 MILLIGRAM(S): 20 TABLET, DELAYED RELEASE ORAL at 07:39

## 2022-01-01 RX ADMIN — ONDANSETRON 4 MILLIGRAM(S): 8 TABLET, FILM COATED ORAL at 20:20

## 2022-01-01 RX ADMIN — Medication 3 CAPSULE(S): at 09:54

## 2022-01-01 RX ADMIN — Medication 3 CAPSULE(S): at 07:50

## 2022-01-01 RX ADMIN — Medication 25 MICROGRAM(S): at 07:26

## 2022-01-01 RX ADMIN — Medication 10 MILLIGRAM(S): at 00:37

## 2022-01-01 RX ADMIN — Medication 3 CAPSULE(S): at 07:37

## 2022-01-01 RX ADMIN — SACUBITRIL AND VALSARTAN 1 TABLET(S): 24; 26 TABLET, FILM COATED ORAL at 18:36

## 2022-01-01 RX ADMIN — PIPERACILLIN AND TAZOBACTAM 25 GRAM(S): 4; .5 INJECTION, POWDER, LYOPHILIZED, FOR SOLUTION INTRAVENOUS at 21:37

## 2022-01-01 RX ADMIN — Medication 1 DROP(S): at 17:17

## 2022-01-01 RX ADMIN — OXYCODONE HYDROCHLORIDE 5 MILLIGRAM(S): 5 TABLET ORAL at 11:17

## 2022-01-01 RX ADMIN — PIPERACILLIN AND TAZOBACTAM 25 GRAM(S): 4; .5 INJECTION, POWDER, LYOPHILIZED, FOR SOLUTION INTRAVENOUS at 14:02

## 2022-01-01 RX ADMIN — GABAPENTIN 100 MILLIGRAM(S): 400 CAPSULE ORAL at 05:57

## 2022-01-01 RX ADMIN — Medication 400 MILLIGRAM(S): at 12:48

## 2022-01-01 RX ADMIN — ENOXAPARIN SODIUM 40 MILLIGRAM(S): 100 INJECTION SUBCUTANEOUS at 12:46

## 2022-01-01 RX ADMIN — Medication 400 MILLIGRAM(S): at 21:02

## 2022-01-01 RX ADMIN — Medication 650 MILLIGRAM(S): at 21:17

## 2022-01-01 RX ADMIN — Medication 3 CAPSULE(S): at 17:16

## 2022-01-01 RX ADMIN — Medication 3 MILLIGRAM(S): at 23:50

## 2022-01-01 RX ADMIN — Medication 25 MICROGRAM(S): at 05:41

## 2022-01-01 RX ADMIN — Medication 650 MILLIGRAM(S): at 22:52

## 2022-01-01 RX ADMIN — HEPARIN SODIUM 1200 UNIT(S)/HR: 5000 INJECTION INTRAVENOUS; SUBCUTANEOUS at 20:30

## 2022-01-01 RX ADMIN — TAMSULOSIN HYDROCHLORIDE 0.4 MILLIGRAM(S): 0.4 CAPSULE ORAL at 21:05

## 2022-01-01 RX ADMIN — Medication 25 MICROGRAM(S): at 05:58

## 2022-01-01 RX ADMIN — Medication 650 MILLIGRAM(S): at 01:36

## 2022-01-01 RX ADMIN — GABAPENTIN 100 MILLIGRAM(S): 400 CAPSULE ORAL at 06:17

## 2022-01-01 RX ADMIN — Medication 650 MILLIGRAM(S): at 17:27

## 2022-01-01 RX ADMIN — ONDANSETRON 8 MILLIGRAM(S): 8 TABLET, FILM COATED ORAL at 18:43

## 2022-01-01 RX ADMIN — Medication 650 MILLIGRAM(S): at 06:14

## 2022-01-01 RX ADMIN — SACUBITRIL AND VALSARTAN 1 TABLET(S): 24; 26 TABLET, FILM COATED ORAL at 17:24

## 2022-01-01 RX ADMIN — PANTOPRAZOLE SODIUM 40 MILLIGRAM(S): 20 TABLET, DELAYED RELEASE ORAL at 06:44

## 2022-01-01 RX ADMIN — TRAMADOL HYDROCHLORIDE 25 MILLIGRAM(S): 50 TABLET ORAL at 21:11

## 2022-01-01 RX ADMIN — PIPERACILLIN AND TAZOBACTAM 3.38 GRAM(S): 4; .5 INJECTION, POWDER, LYOPHILIZED, FOR SOLUTION INTRAVENOUS at 19:23

## 2022-01-01 RX ADMIN — Medication 0.25 MILLIGRAM(S): at 04:43

## 2022-01-01 RX ADMIN — DULOXETINE HYDROCHLORIDE 60 MILLIGRAM(S): 30 CAPSULE, DELAYED RELEASE ORAL at 12:27

## 2022-01-01 RX ADMIN — GABAPENTIN 100 MILLIGRAM(S): 400 CAPSULE ORAL at 06:44

## 2022-01-01 RX ADMIN — Medication 650 MILLIGRAM(S): at 18:38

## 2022-01-01 RX ADMIN — Medication 166.67 MILLIGRAM(S): at 06:08

## 2022-01-01 RX ADMIN — SACUBITRIL AND VALSARTAN 1 TABLET(S): 24; 26 TABLET, FILM COATED ORAL at 06:09

## 2022-01-01 RX ADMIN — Medication 3 CAPSULE(S): at 17:06

## 2022-01-01 RX ADMIN — Medication 3 CAPSULE(S): at 13:25

## 2022-01-01 RX ADMIN — Medication 3 CAPSULE(S): at 13:26

## 2022-01-01 RX ADMIN — Medication 1: at 12:09

## 2022-01-01 RX ADMIN — Medication 3 CAPSULE(S): at 13:08

## 2022-01-01 RX ADMIN — GABAPENTIN 100 MILLIGRAM(S): 400 CAPSULE ORAL at 22:20

## 2022-01-01 RX ADMIN — OXYCODONE HYDROCHLORIDE 5 MILLIGRAM(S): 5 TABLET ORAL at 19:39

## 2022-01-01 RX ADMIN — PIPERACILLIN AND TAZOBACTAM 25 GRAM(S): 4; .5 INJECTION, POWDER, LYOPHILIZED, FOR SOLUTION INTRAVENOUS at 05:15

## 2022-01-01 RX ADMIN — APIXABAN 5 MILLIGRAM(S): 2.5 TABLET, FILM COATED ORAL at 17:28

## 2022-01-01 RX ADMIN — Medication 3 CAPSULE(S): at 11:38

## 2022-01-01 RX ADMIN — PANTOPRAZOLE SODIUM 40 MILLIGRAM(S): 20 TABLET, DELAYED RELEASE ORAL at 05:39

## 2022-01-01 RX ADMIN — ONDANSETRON 4 MILLIGRAM(S): 8 TABLET, FILM COATED ORAL at 01:35

## 2022-01-01 RX ADMIN — Medication 650 MILLIGRAM(S): at 12:47

## 2022-01-01 RX ADMIN — ENOXAPARIN SODIUM 85 MILLIGRAM(S): 100 INJECTION SUBCUTANEOUS at 17:20

## 2022-01-01 RX ADMIN — BRIMONIDINE TARTRATE 1 DROP(S): 2 SOLUTION/ DROPS OPHTHALMIC at 05:15

## 2022-01-01 RX ADMIN — PIPERACILLIN AND TAZOBACTAM 25 GRAM(S): 4; .5 INJECTION, POWDER, LYOPHILIZED, FOR SOLUTION INTRAVENOUS at 21:44

## 2022-01-01 RX ADMIN — GABAPENTIN 100 MILLIGRAM(S): 400 CAPSULE ORAL at 14:10

## 2022-01-01 RX ADMIN — GABAPENTIN 100 MILLIGRAM(S): 400 CAPSULE ORAL at 05:51

## 2022-01-01 RX ADMIN — SACUBITRIL AND VALSARTAN 1 TABLET(S): 24; 26 TABLET, FILM COATED ORAL at 21:58

## 2022-01-01 RX ADMIN — Medication 1 TABLET(S): at 12:17

## 2022-01-01 RX ADMIN — ONDANSETRON 4 MILLIGRAM(S): 8 TABLET, FILM COATED ORAL at 19:56

## 2022-01-01 RX ADMIN — PANTOPRAZOLE SODIUM 40 MILLIGRAM(S): 20 TABLET, DELAYED RELEASE ORAL at 06:39

## 2022-01-01 RX ADMIN — Medication 20 MILLIGRAM(S): at 15:42

## 2022-01-01 RX ADMIN — Medication 166.67 MILLIGRAM(S): at 02:04

## 2022-01-01 RX ADMIN — DULOXETINE HYDROCHLORIDE 60 MILLIGRAM(S): 30 CAPSULE, DELAYED RELEASE ORAL at 12:47

## 2022-01-01 RX ADMIN — PANTOPRAZOLE SODIUM 40 MILLIGRAM(S): 20 TABLET, DELAYED RELEASE ORAL at 06:16

## 2022-01-01 RX ADMIN — Medication 650 MILLIGRAM(S): at 13:18

## 2022-01-01 RX ADMIN — Medication 50 MILLIEQUIVALENT(S): at 16:41

## 2022-01-01 RX ADMIN — Medication 650 MILLIGRAM(S): at 19:17

## 2022-01-01 RX ADMIN — Medication 4.2 MICROGRAM(S)/KG/MIN: at 22:44

## 2022-01-01 RX ADMIN — AMIODARONE HYDROCHLORIDE 200 MILLIGRAM(S): 400 TABLET ORAL at 06:04

## 2022-01-01 RX ADMIN — LATANOPROST 1 DROP(S): 0.05 SOLUTION/ DROPS OPHTHALMIC; TOPICAL at 21:35

## 2022-01-01 RX ADMIN — KETAMINE HYDROCHLORIDE 17.9 MG/KG/HR: 100 INJECTION INTRAMUSCULAR; INTRAVENOUS at 18:18

## 2022-01-01 RX ADMIN — Medication 650 MILLIGRAM(S): at 07:39

## 2022-01-01 RX ADMIN — ENOXAPARIN SODIUM 85 MILLIGRAM(S): 100 INJECTION SUBCUTANEOUS at 06:13

## 2022-01-01 RX ADMIN — Medication 650 MILLIGRAM(S): at 12:15

## 2022-01-01 RX ADMIN — AMIODARONE HYDROCHLORIDE 200 MILLIGRAM(S): 400 TABLET ORAL at 06:00

## 2022-01-01 RX ADMIN — Medication 650 MILLIGRAM(S): at 12:27

## 2022-01-01 RX ADMIN — TAMSULOSIN HYDROCHLORIDE 0.4 MILLIGRAM(S): 0.4 CAPSULE ORAL at 21:28

## 2022-01-01 RX ADMIN — TAMSULOSIN HYDROCHLORIDE 0.4 MILLIGRAM(S): 0.4 CAPSULE ORAL at 21:24

## 2022-01-01 RX ADMIN — Medication 1 TABLET(S): at 06:42

## 2022-01-01 RX ADMIN — QUETIAPINE FUMARATE 25 MILLIGRAM(S): 200 TABLET, FILM COATED ORAL at 21:53

## 2022-01-01 RX ADMIN — PANTOPRAZOLE SODIUM 40 MILLIGRAM(S): 20 TABLET, DELAYED RELEASE ORAL at 06:00

## 2022-01-01 RX ADMIN — ONDANSETRON 4 MILLIGRAM(S): 8 TABLET, FILM COATED ORAL at 19:39

## 2022-01-01 RX ADMIN — RIVAROXABAN 15 MILLIGRAM(S): KIT at 06:13

## 2022-01-01 RX ADMIN — Medication 400 MILLIGRAM(S): at 04:09

## 2022-01-01 RX ADMIN — CHLORHEXIDINE GLUCONATE 1 APPLICATION(S): 213 SOLUTION TOPICAL at 07:02

## 2022-01-01 RX ADMIN — Medication 0.25 MILLIGRAM(S): at 21:18

## 2022-01-01 RX ADMIN — Medication 400 MILLIGRAM(S): at 06:18

## 2022-01-01 RX ADMIN — AMIODARONE HYDROCHLORIDE 200 MILLIGRAM(S): 400 TABLET ORAL at 06:38

## 2022-01-01 RX ADMIN — GABAPENTIN 100 MILLIGRAM(S): 400 CAPSULE ORAL at 16:24

## 2022-01-01 RX ADMIN — HEPARIN SODIUM 1200 UNIT(S)/HR: 5000 INJECTION INTRAVENOUS; SUBCUTANEOUS at 19:42

## 2022-01-01 RX ADMIN — PIPERACILLIN AND TAZOBACTAM 25 GRAM(S): 4; .5 INJECTION, POWDER, LYOPHILIZED, FOR SOLUTION INTRAVENOUS at 05:39

## 2022-01-01 RX ADMIN — Medication 3 CAPSULE(S): at 17:01

## 2022-01-01 RX ADMIN — GABAPENTIN 100 MILLIGRAM(S): 400 CAPSULE ORAL at 22:17

## 2022-01-01 RX ADMIN — Medication 0.25 MILLIGRAM(S): at 23:05

## 2022-01-01 RX ADMIN — Medication 3 CAPSULE(S): at 08:14

## 2022-01-01 RX ADMIN — DULOXETINE HYDROCHLORIDE 60 MILLIGRAM(S): 30 CAPSULE, DELAYED RELEASE ORAL at 12:15

## 2022-01-01 RX ADMIN — PANTOPRAZOLE SODIUM 40 MILLIGRAM(S): 20 TABLET, DELAYED RELEASE ORAL at 05:27

## 2022-01-01 RX ADMIN — Medication 650 MILLIGRAM(S): at 08:02

## 2022-01-01 RX ADMIN — ENOXAPARIN SODIUM 85 MILLIGRAM(S): 100 INJECTION SUBCUTANEOUS at 08:12

## 2022-01-01 RX ADMIN — RIVAROXABAN 20 MILLIGRAM(S): KIT at 17:20

## 2022-01-01 RX ADMIN — OXYCODONE HYDROCHLORIDE 10 MILLIGRAM(S): 5 TABLET ORAL at 14:24

## 2022-01-01 RX ADMIN — GABAPENTIN 100 MILLIGRAM(S): 400 CAPSULE ORAL at 06:34

## 2022-01-01 RX ADMIN — TAMSULOSIN HYDROCHLORIDE 0.4 MILLIGRAM(S): 0.4 CAPSULE ORAL at 23:43

## 2022-01-01 RX ADMIN — Medication 3 CAPSULE(S): at 07:32

## 2022-01-01 RX ADMIN — Medication 1 DROP(S): at 05:16

## 2022-01-01 RX ADMIN — Medication 3 CAPSULE(S): at 16:41

## 2022-01-01 RX ADMIN — TRAMADOL HYDROCHLORIDE 25 MILLIGRAM(S): 50 TABLET ORAL at 22:35

## 2022-01-01 RX ADMIN — HEPARIN SODIUM 1000 UNIT(S)/HR: 5000 INJECTION INTRAVENOUS; SUBCUTANEOUS at 20:11

## 2022-01-01 RX ADMIN — GABAPENTIN 100 MILLIGRAM(S): 400 CAPSULE ORAL at 13:39

## 2022-01-01 RX ADMIN — Medication 0.25 MILLIGRAM(S): at 23:57

## 2022-01-01 RX ADMIN — GABAPENTIN 100 MILLIGRAM(S): 400 CAPSULE ORAL at 06:47

## 2022-01-01 RX ADMIN — GABAPENTIN 100 MILLIGRAM(S): 400 CAPSULE ORAL at 21:58

## 2022-01-01 RX ADMIN — MIDODRINE HYDROCHLORIDE 5 MILLIGRAM(S): 2.5 TABLET ORAL at 21:41

## 2022-01-01 RX ADMIN — Medication 3 CAPSULE(S): at 12:40

## 2022-01-01 RX ADMIN — Medication 100 GRAM(S): at 08:34

## 2022-01-01 RX ADMIN — PHENYLEPHRINE HYDROCHLORIDE 109 MICROGRAM(S)/KG/MIN: 10 INJECTION INTRAVENOUS at 02:04

## 2022-01-01 RX ADMIN — OXYCODONE HYDROCHLORIDE 5 MILLIGRAM(S): 5 TABLET ORAL at 16:06

## 2022-01-01 RX ADMIN — HEPARIN SODIUM 5000 UNIT(S): 5000 INJECTION INTRAVENOUS; SUBCUTANEOUS at 21:37

## 2022-01-01 RX ADMIN — KETAMINE HYDROCHLORIDE 17.9 MG/KG/HR: 100 INJECTION INTRAMUSCULAR; INTRAVENOUS at 21:29

## 2022-01-01 RX ADMIN — PROPOFOL 5.37 MICROGRAM(S)/KG/MIN: 10 INJECTION, EMULSION INTRAVENOUS at 16:43

## 2022-01-01 RX ADMIN — OXYCODONE HYDROCHLORIDE 5 MILLIGRAM(S): 5 TABLET ORAL at 17:06

## 2022-01-01 RX ADMIN — TRAMADOL HYDROCHLORIDE 25 MILLIGRAM(S): 50 TABLET ORAL at 21:39

## 2022-01-01 RX ADMIN — Medication 1 DROP(S): at 12:17

## 2022-01-01 RX ADMIN — SACUBITRIL AND VALSARTAN 1 TABLET(S): 24; 26 TABLET, FILM COATED ORAL at 06:00

## 2022-01-01 RX ADMIN — Medication 25 MICROGRAM(S): at 06:41

## 2022-01-01 RX ADMIN — Medication 3 CAPSULE(S): at 17:27

## 2022-01-01 RX ADMIN — MIDODRINE HYDROCHLORIDE 10 MILLIGRAM(S): 2.5 TABLET ORAL at 05:31

## 2022-01-01 RX ADMIN — Medication 400 MILLIGRAM(S): at 10:41

## 2022-01-01 RX ADMIN — Medication 1 DROP(S): at 17:05

## 2022-01-01 RX ADMIN — Medication 30 MILLILITER(S): at 11:29

## 2022-01-01 RX ADMIN — Medication 0.25 MILLIGRAM(S): at 20:33

## 2022-01-01 RX ADMIN — TAMSULOSIN HYDROCHLORIDE 0.4 MILLIGRAM(S): 0.4 CAPSULE ORAL at 23:03

## 2022-01-01 RX ADMIN — SACUBITRIL AND VALSARTAN 1 TABLET(S): 24; 26 TABLET, FILM COATED ORAL at 05:24

## 2022-01-01 RX ADMIN — GABAPENTIN 100 MILLIGRAM(S): 400 CAPSULE ORAL at 13:25

## 2022-01-01 RX ADMIN — QUETIAPINE FUMARATE 25 MILLIGRAM(S): 200 TABLET, FILM COATED ORAL at 21:09

## 2022-01-01 RX ADMIN — Medication 25 MICROGRAM(S): at 05:26

## 2022-01-01 RX ADMIN — Medication 0.25 MILLIGRAM(S): at 21:09

## 2022-01-01 RX ADMIN — ENOXAPARIN SODIUM 40 MILLIGRAM(S): 100 INJECTION SUBCUTANEOUS at 13:01

## 2022-01-01 RX ADMIN — HEPARIN SODIUM 0 UNIT(S)/HR: 5000 INJECTION INTRAVENOUS; SUBCUTANEOUS at 01:37

## 2022-01-01 RX ADMIN — SACUBITRIL AND VALSARTAN 1 TABLET(S): 24; 26 TABLET, FILM COATED ORAL at 05:48

## 2022-01-01 RX ADMIN — Medication 650 MILLIGRAM(S): at 00:49

## 2022-01-01 RX ADMIN — Medication 650 MILLIGRAM(S): at 01:35

## 2022-01-01 RX ADMIN — Medication 0.25 MILLIGRAM(S): at 22:12

## 2022-01-01 RX ADMIN — GABAPENTIN 100 MILLIGRAM(S): 400 CAPSULE ORAL at 06:04

## 2022-01-01 RX ADMIN — Medication 650 MILLIGRAM(S): at 08:17

## 2022-01-01 RX ADMIN — LATANOPROST 1 DROP(S): 0.05 SOLUTION/ DROPS OPHTHALMIC; TOPICAL at 21:40

## 2022-01-01 RX ADMIN — Medication 650 MILLIGRAM(S): at 12:59

## 2022-01-01 RX ADMIN — Medication 50 MILLIGRAM(S): at 06:18

## 2022-01-01 RX ADMIN — Medication 0.25 MILLIGRAM(S): at 22:11

## 2022-01-01 RX ADMIN — Medication 3 CAPSULE(S): at 07:38

## 2022-01-01 RX ADMIN — Medication 3 CAPSULE(S): at 10:38

## 2022-01-01 RX ADMIN — Medication 3 MILLIGRAM(S): at 21:32

## 2022-01-01 RX ADMIN — TRAMADOL HYDROCHLORIDE 25 MILLIGRAM(S): 50 TABLET ORAL at 21:34

## 2022-01-01 RX ADMIN — GABAPENTIN 100 MILLIGRAM(S): 400 CAPSULE ORAL at 07:05

## 2022-01-01 RX ADMIN — Medication 30 MILLILITER(S): at 05:39

## 2022-01-01 RX ADMIN — PANTOPRAZOLE SODIUM 40 MILLIGRAM(S): 20 TABLET, DELAYED RELEASE ORAL at 06:09

## 2022-01-01 RX ADMIN — Medication 0.25 MILLIGRAM(S): at 21:17

## 2022-01-01 RX ADMIN — RIVAROXABAN 15 MILLIGRAM(S): KIT at 18:01

## 2022-01-01 RX ADMIN — Medication 20 MILLIGRAM(S): at 10:52

## 2022-01-01 RX ADMIN — Medication 650 MILLIGRAM(S): at 00:38

## 2022-01-01 RX ADMIN — Medication 0.25 MILLIGRAM(S): at 22:02

## 2022-01-01 RX ADMIN — ENOXAPARIN SODIUM 40 MILLIGRAM(S): 100 INJECTION SUBCUTANEOUS at 12:29

## 2022-01-01 RX ADMIN — TAMSULOSIN HYDROCHLORIDE 0.4 MILLIGRAM(S): 0.4 CAPSULE ORAL at 21:40

## 2022-01-01 RX ADMIN — AMIODARONE HYDROCHLORIDE 200 MILLIGRAM(S): 400 TABLET ORAL at 06:44

## 2022-01-01 RX ADMIN — PIPERACILLIN AND TAZOBACTAM 25 GRAM(S): 4; .5 INJECTION, POWDER, LYOPHILIZED, FOR SOLUTION INTRAVENOUS at 17:26

## 2022-01-01 RX ADMIN — SACUBITRIL AND VALSARTAN 1 TABLET(S): 24; 26 TABLET, FILM COATED ORAL at 18:12

## 2022-01-01 RX ADMIN — Medication 400 MILLIGRAM(S): at 18:35

## 2022-01-01 RX ADMIN — DULOXETINE HYDROCHLORIDE 60 MILLIGRAM(S): 30 CAPSULE, DELAYED RELEASE ORAL at 13:21

## 2022-01-01 RX ADMIN — ONDANSETRON 4 MILLIGRAM(S): 8 TABLET, FILM COATED ORAL at 21:48

## 2022-01-01 RX ADMIN — GABAPENTIN 100 MILLIGRAM(S): 400 CAPSULE ORAL at 07:08

## 2022-01-01 RX ADMIN — PANTOPRAZOLE SODIUM 40 MILLIGRAM(S): 20 TABLET, DELAYED RELEASE ORAL at 06:26

## 2022-01-01 RX ADMIN — HEPARIN SODIUM 1200 UNIT(S)/HR: 5000 INJECTION INTRAVENOUS; SUBCUTANEOUS at 15:08

## 2022-01-01 RX ADMIN — Medication 400 MILLIGRAM(S): at 10:14

## 2022-01-01 RX ADMIN — Medication 650 MILLIGRAM(S): at 17:02

## 2022-01-01 RX ADMIN — Medication 1: at 11:51

## 2022-01-01 RX ADMIN — PANTOPRAZOLE SODIUM 40 MILLIGRAM(S): 20 TABLET, DELAYED RELEASE ORAL at 05:15

## 2022-01-01 RX ADMIN — Medication 3 CAPSULE(S): at 17:02

## 2022-01-01 RX ADMIN — TAMSULOSIN HYDROCHLORIDE 0.4 MILLIGRAM(S): 0.4 CAPSULE ORAL at 21:25

## 2022-01-01 RX ADMIN — Medication 3 CAPSULE(S): at 17:19

## 2022-01-01 RX ADMIN — AMIODARONE HYDROCHLORIDE 200 MILLIGRAM(S): 400 TABLET ORAL at 05:59

## 2022-01-01 RX ADMIN — DULOXETINE HYDROCHLORIDE 60 MILLIGRAM(S): 30 CAPSULE, DELAYED RELEASE ORAL at 12:18

## 2022-01-01 RX ADMIN — AMIODARONE HYDROCHLORIDE 200 MILLIGRAM(S): 400 TABLET ORAL at 05:54

## 2022-01-01 RX ADMIN — Medication 3 CAPSULE(S): at 12:48

## 2022-01-01 RX ADMIN — ENOXAPARIN SODIUM 85 MILLIGRAM(S): 100 INJECTION SUBCUTANEOUS at 18:15

## 2022-01-01 RX ADMIN — ENOXAPARIN SODIUM 85 MILLIGRAM(S): 100 INJECTION SUBCUTANEOUS at 06:06

## 2022-01-01 RX ADMIN — Medication 3 CAPSULE(S): at 19:31

## 2022-01-01 RX ADMIN — Medication 650 MILLIGRAM(S): at 21:05

## 2022-01-01 RX ADMIN — MIDODRINE HYDROCHLORIDE 10 MILLIGRAM(S): 2.5 TABLET ORAL at 14:18

## 2022-01-01 RX ADMIN — Medication 25 MICROGRAM(S): at 06:32

## 2022-01-01 RX ADMIN — TAMSULOSIN HYDROCHLORIDE 0.4 MILLIGRAM(S): 0.4 CAPSULE ORAL at 21:20

## 2022-01-01 RX ADMIN — Medication 650 MILLIGRAM(S): at 05:50

## 2022-01-01 RX ADMIN — SODIUM CHLORIDE 500 MILLILITER(S): 9 INJECTION INTRAMUSCULAR; INTRAVENOUS; SUBCUTANEOUS at 04:44

## 2022-01-01 RX ADMIN — ONDANSETRON 8 MILLIGRAM(S): 8 TABLET, FILM COATED ORAL at 22:02

## 2022-01-01 RX ADMIN — GABAPENTIN 100 MILLIGRAM(S): 400 CAPSULE ORAL at 06:54

## 2022-01-01 RX ADMIN — Medication 0.25 MILLIGRAM(S): at 21:27

## 2022-01-01 RX ADMIN — Medication 650 MILLIGRAM(S): at 00:52

## 2022-01-01 RX ADMIN — Medication 650 MILLIGRAM(S): at 11:53

## 2022-01-01 RX ADMIN — PIPERACILLIN AND TAZOBACTAM 25 GRAM(S): 4; .5 INJECTION, POWDER, LYOPHILIZED, FOR SOLUTION INTRAVENOUS at 21:38

## 2022-01-01 RX ADMIN — OXYCODONE HYDROCHLORIDE 10 MILLIGRAM(S): 5 TABLET ORAL at 09:50

## 2022-01-01 RX ADMIN — Medication 3 CAPSULE(S): at 17:57

## 2022-01-01 RX ADMIN — SACUBITRIL AND VALSARTAN 1 TABLET(S): 24; 26 TABLET, FILM COATED ORAL at 18:02

## 2022-01-01 RX ADMIN — PANTOPRAZOLE SODIUM 40 MILLIGRAM(S): 20 TABLET, DELAYED RELEASE ORAL at 05:56

## 2022-01-01 RX ADMIN — Medication 650 MILLIGRAM(S): at 12:14

## 2022-01-01 RX ADMIN — AMIODARONE HYDROCHLORIDE 200 MILLIGRAM(S): 400 TABLET ORAL at 05:58

## 2022-01-01 RX ADMIN — AMIODARONE HYDROCHLORIDE 200 MILLIGRAM(S): 400 TABLET ORAL at 06:35

## 2022-01-01 RX ADMIN — CHLORHEXIDINE GLUCONATE 1 APPLICATION(S): 213 SOLUTION TOPICAL at 05:59

## 2022-01-01 RX ADMIN — Medication 25 MICROGRAM(S): at 05:55

## 2022-01-01 RX ADMIN — TAMSULOSIN HYDROCHLORIDE 0.4 MILLIGRAM(S): 0.4 CAPSULE ORAL at 22:52

## 2022-01-01 RX ADMIN — SODIUM CHLORIDE 25 MILLILITER(S): 9 INJECTION, SOLUTION INTRAVENOUS at 13:54

## 2022-01-01 RX ADMIN — Medication 3 CAPSULE(S): at 11:56

## 2022-01-01 RX ADMIN — Medication 650 MILLIGRAM(S): at 07:25

## 2022-01-01 RX ADMIN — Medication 3 CAPSULE(S): at 12:30

## 2022-01-01 RX ADMIN — Medication 10 MILLIGRAM(S): at 22:47

## 2022-01-01 RX ADMIN — ONDANSETRON 4 MILLIGRAM(S): 8 TABLET, FILM COATED ORAL at 16:54

## 2022-01-01 RX ADMIN — GABAPENTIN 100 MILLIGRAM(S): 400 CAPSULE ORAL at 22:02

## 2022-01-01 RX ADMIN — LATANOPROST 1 DROP(S): 0.05 SOLUTION/ DROPS OPHTHALMIC; TOPICAL at 21:39

## 2022-01-01 RX ADMIN — PIPERACILLIN AND TAZOBACTAM 200 GRAM(S): 4; .5 INJECTION, POWDER, LYOPHILIZED, FOR SOLUTION INTRAVENOUS at 17:41

## 2022-01-01 RX ADMIN — CHLORHEXIDINE GLUCONATE 1 APPLICATION(S): 213 SOLUTION TOPICAL at 16:43

## 2022-01-01 RX ADMIN — ENOXAPARIN SODIUM 40 MILLIGRAM(S): 100 INJECTION SUBCUTANEOUS at 13:46

## 2022-01-01 RX ADMIN — ONDANSETRON 8 MILLIGRAM(S): 8 TABLET, FILM COATED ORAL at 06:58

## 2022-01-01 RX ADMIN — Medication 50 MILLIGRAM(S): at 05:24

## 2022-01-01 RX ADMIN — TAMSULOSIN HYDROCHLORIDE 0.4 MILLIGRAM(S): 0.4 CAPSULE ORAL at 22:22

## 2022-01-01 RX ADMIN — PIPERACILLIN AND TAZOBACTAM 25 GRAM(S): 4; .5 INJECTION, POWDER, LYOPHILIZED, FOR SOLUTION INTRAVENOUS at 01:35

## 2022-01-01 RX ADMIN — Medication 3 CAPSULE(S): at 17:14

## 2022-01-01 RX ADMIN — TAMSULOSIN HYDROCHLORIDE 0.4 MILLIGRAM(S): 0.4 CAPSULE ORAL at 21:32

## 2022-01-01 RX ADMIN — HEPARIN SODIUM 0 UNIT(S)/HR: 5000 INJECTION INTRAVENOUS; SUBCUTANEOUS at 10:26

## 2022-01-01 RX ADMIN — Medication 650 MILLIGRAM(S): at 01:26

## 2022-01-01 RX ADMIN — Medication 25 MICROGRAM(S): at 06:19

## 2022-01-01 RX ADMIN — Medication 30 MILLILITER(S): at 05:55

## 2022-01-01 RX ADMIN — PANTOPRAZOLE SODIUM 40 MILLIGRAM(S): 20 TABLET, DELAYED RELEASE ORAL at 06:59

## 2022-01-01 RX ADMIN — Medication 650 MILLIGRAM(S): at 17:04

## 2022-01-01 RX ADMIN — ONDANSETRON 4 MILLIGRAM(S): 8 TABLET, FILM COATED ORAL at 21:03

## 2022-01-01 RX ADMIN — CHLORHEXIDINE GLUCONATE 1 APPLICATION(S): 213 SOLUTION TOPICAL at 13:06

## 2022-01-01 RX ADMIN — FENTANYL CITRATE 50 MICROGRAM(S): 50 INJECTION INTRAVENOUS at 16:41

## 2022-01-01 RX ADMIN — TAMSULOSIN HYDROCHLORIDE 0.4 MILLIGRAM(S): 0.4 CAPSULE ORAL at 22:02

## 2022-01-01 RX ADMIN — Medication 10 MILLIGRAM(S): at 23:10

## 2022-01-01 RX ADMIN — Medication 3 CAPSULE(S): at 07:42

## 2022-01-01 RX ADMIN — Medication 30 MILLILITER(S): at 23:01

## 2022-01-01 RX ADMIN — Medication 10 MILLIGRAM(S): at 14:16

## 2022-01-01 RX ADMIN — Medication 650 MILLIGRAM(S): at 12:18

## 2022-01-01 RX ADMIN — Medication 3 MILLIGRAM(S): at 21:27

## 2022-01-01 RX ADMIN — Medication 30 MILLILITER(S): at 05:35

## 2022-01-01 RX ADMIN — Medication 3 CAPSULE(S): at 08:59

## 2022-01-01 RX ADMIN — Medication 100 GRAM(S): at 16:06

## 2022-01-01 RX ADMIN — Medication 400 MILLIGRAM(S): at 22:22

## 2022-01-01 RX ADMIN — DULOXETINE HYDROCHLORIDE 60 MILLIGRAM(S): 30 CAPSULE, DELAYED RELEASE ORAL at 12:55

## 2022-01-01 RX ADMIN — HEPARIN SODIUM 5000 UNIT(S): 5000 INJECTION INTRAVENOUS; SUBCUTANEOUS at 05:31

## 2022-01-01 RX ADMIN — CHLORHEXIDINE GLUCONATE 1 APPLICATION(S): 213 SOLUTION TOPICAL at 12:14

## 2022-01-01 RX ADMIN — AMIODARONE HYDROCHLORIDE 200 MILLIGRAM(S): 400 TABLET ORAL at 05:35

## 2022-01-01 RX ADMIN — SACUBITRIL AND VALSARTAN 1 TABLET(S): 24; 26 TABLET, FILM COATED ORAL at 07:33

## 2022-01-01 RX ADMIN — PIPERACILLIN AND TAZOBACTAM 25 GRAM(S): 4; .5 INJECTION, POWDER, LYOPHILIZED, FOR SOLUTION INTRAVENOUS at 05:58

## 2022-01-01 RX ADMIN — Medication 3 CAPSULE(S): at 17:20

## 2022-01-01 RX ADMIN — Medication 20 MILLIGRAM(S): at 07:26

## 2022-01-01 RX ADMIN — Medication 3 CAPSULE(S): at 08:49

## 2022-01-01 RX ADMIN — ENOXAPARIN SODIUM 40 MILLIGRAM(S): 100 INJECTION SUBCUTANEOUS at 11:57

## 2022-01-01 RX ADMIN — AMIODARONE HYDROCHLORIDE 200 MILLIGRAM(S): 400 TABLET ORAL at 05:55

## 2022-01-01 RX ADMIN — ONDANSETRON 4 MILLIGRAM(S): 8 TABLET, FILM COATED ORAL at 18:28

## 2022-01-01 RX ADMIN — DULOXETINE HYDROCHLORIDE 60 MILLIGRAM(S): 30 CAPSULE, DELAYED RELEASE ORAL at 12:16

## 2022-01-01 RX ADMIN — Medication 650 MILLIGRAM(S): at 23:06

## 2022-01-01 RX ADMIN — SIMETHICONE 80 MILLIGRAM(S): 80 TABLET, CHEWABLE ORAL at 02:21

## 2022-01-01 RX ADMIN — Medication 3 CAPSULE(S): at 17:15

## 2022-01-01 RX ADMIN — Medication 400 MILLIGRAM(S): at 18:05

## 2022-01-01 RX ADMIN — Medication 650 MILLIGRAM(S): at 19:12

## 2022-01-01 RX ADMIN — SODIUM CHLORIDE 500 MILLILITER(S): 9 INJECTION, SOLUTION INTRAVENOUS at 20:55

## 2022-01-01 RX ADMIN — Medication 650 MILLIGRAM(S): at 01:30

## 2022-01-01 RX ADMIN — POTASSIUM PHOSPHATE, MONOBASIC POTASSIUM PHOSPHATE, DIBASIC 83.33 MILLIMOLE(S): 236; 224 INJECTION, SOLUTION INTRAVENOUS at 11:59

## 2022-01-01 RX ADMIN — SODIUM CHLORIDE 125 MILLILITER(S): 9 INJECTION INTRAMUSCULAR; INTRAVENOUS; SUBCUTANEOUS at 08:35

## 2022-01-01 RX ADMIN — Medication 650 MILLIGRAM(S): at 23:41

## 2022-01-01 RX ADMIN — SACUBITRIL AND VALSARTAN 1 TABLET(S): 24; 26 TABLET, FILM COATED ORAL at 05:39

## 2022-01-01 RX ADMIN — OXYCODONE HYDROCHLORIDE 5 MILLIGRAM(S): 5 TABLET ORAL at 19:54

## 2022-01-01 RX ADMIN — AMIODARONE HYDROCHLORIDE 200 MILLIGRAM(S): 400 TABLET ORAL at 07:10

## 2022-01-01 RX ADMIN — ENOXAPARIN SODIUM 85 MILLIGRAM(S): 100 INJECTION SUBCUTANEOUS at 17:38

## 2022-01-01 RX ADMIN — Medication 650 MILLIGRAM(S): at 11:16

## 2022-01-01 RX ADMIN — Medication 0.25 MILLIGRAM(S): at 23:02

## 2022-01-01 RX ADMIN — DULOXETINE HYDROCHLORIDE 60 MILLIGRAM(S): 30 CAPSULE, DELAYED RELEASE ORAL at 11:46

## 2022-01-01 RX ADMIN — DULOXETINE HYDROCHLORIDE 60 MILLIGRAM(S): 30 CAPSULE, DELAYED RELEASE ORAL at 13:08

## 2022-01-01 RX ADMIN — PANTOPRAZOLE SODIUM 40 MILLIGRAM(S): 20 TABLET, DELAYED RELEASE ORAL at 07:50

## 2022-01-01 RX ADMIN — Medication 650 MILLIGRAM(S): at 18:32

## 2022-01-01 RX ADMIN — Medication 4.2 MICROGRAM(S)/KG/MIN: at 02:04

## 2022-01-01 RX ADMIN — Medication 250 MILLIGRAM(S): at 01:53

## 2022-01-01 RX ADMIN — Medication 30 MILLILITER(S): at 04:29

## 2022-01-01 RX ADMIN — PIPERACILLIN AND TAZOBACTAM 25 GRAM(S): 4; .5 INJECTION, POWDER, LYOPHILIZED, FOR SOLUTION INTRAVENOUS at 15:45

## 2022-01-01 RX ADMIN — Medication 3 MILLIGRAM(S): at 22:03

## 2022-01-01 RX ADMIN — BRIMONIDINE TARTRATE 1 DROP(S): 2 SOLUTION/ DROPS OPHTHALMIC at 05:54

## 2022-01-01 RX ADMIN — Medication 650 MILLIGRAM(S): at 12:24

## 2022-01-01 RX ADMIN — Medication 50 MILLIGRAM(S): at 05:16

## 2022-01-01 RX ADMIN — Medication 3 CAPSULE(S): at 15:10

## 2022-01-01 RX ADMIN — Medication 0.25 MILLIGRAM(S): at 01:03

## 2022-01-01 RX ADMIN — Medication 3 CAPSULE(S): at 12:35

## 2022-01-01 RX ADMIN — Medication 650 MILLIGRAM(S): at 18:28

## 2022-01-01 RX ADMIN — Medication 20 MILLIGRAM(S): at 13:40

## 2022-01-01 RX ADMIN — Medication 3 CAPSULE(S): at 16:48

## 2022-01-01 RX ADMIN — PANTOPRAZOLE SODIUM 40 MILLIGRAM(S): 20 TABLET, DELAYED RELEASE ORAL at 06:33

## 2022-01-01 RX ADMIN — GABAPENTIN 100 MILLIGRAM(S): 400 CAPSULE ORAL at 21:17

## 2022-01-01 RX ADMIN — Medication 3 CAPSULE(S): at 16:56

## 2022-01-01 RX ADMIN — Medication 1 DROP(S): at 17:22

## 2022-01-01 RX ADMIN — Medication 10 MILLIGRAM(S): at 17:45

## 2022-01-01 RX ADMIN — Medication 3 CAPSULE(S): at 13:05

## 2022-01-01 RX ADMIN — TAMSULOSIN HYDROCHLORIDE 0.4 MILLIGRAM(S): 0.4 CAPSULE ORAL at 21:03

## 2022-01-01 RX ADMIN — Medication 3 CAPSULE(S): at 08:01

## 2022-01-01 RX ADMIN — BRIMONIDINE TARTRATE 1 DROP(S): 2 SOLUTION/ DROPS OPHTHALMIC at 06:13

## 2022-01-01 RX ADMIN — Medication 0.25 MILLIGRAM(S): at 21:57

## 2022-01-01 RX ADMIN — Medication 3 MILLIGRAM(S): at 01:37

## 2022-01-01 RX ADMIN — ONDANSETRON 4 MILLIGRAM(S): 8 TABLET, FILM COATED ORAL at 12:01

## 2022-01-01 RX ADMIN — GABAPENTIN 100 MILLIGRAM(S): 400 CAPSULE ORAL at 21:33

## 2022-01-01 RX ADMIN — Medication 3 CAPSULE(S): at 09:26

## 2022-01-01 RX ADMIN — OXYCODONE HYDROCHLORIDE 5 MILLIGRAM(S): 5 TABLET ORAL at 19:30

## 2022-01-01 RX ADMIN — OXYCODONE HYDROCHLORIDE 5 MILLIGRAM(S): 5 TABLET ORAL at 15:00

## 2022-01-01 RX ADMIN — QUETIAPINE FUMARATE 25 MILLIGRAM(S): 200 TABLET, FILM COATED ORAL at 21:20

## 2022-01-01 RX ADMIN — OXYCODONE HYDROCHLORIDE 5 MILLIGRAM(S): 5 TABLET ORAL at 21:32

## 2022-01-01 RX ADMIN — Medication 3 CAPSULE(S): at 08:17

## 2022-01-01 RX ADMIN — Medication 400 MILLIGRAM(S): at 21:09

## 2022-01-01 RX ADMIN — SIMETHICONE 80 MILLIGRAM(S): 80 TABLET, CHEWABLE ORAL at 00:21

## 2022-01-01 RX ADMIN — HEPARIN SODIUM 5000 UNIT(S): 5000 INJECTION INTRAVENOUS; SUBCUTANEOUS at 18:14

## 2022-01-01 RX ADMIN — PIPERACILLIN AND TAZOBACTAM 25 GRAM(S): 4; .5 INJECTION, POWDER, LYOPHILIZED, FOR SOLUTION INTRAVENOUS at 01:36

## 2022-01-01 RX ADMIN — OXYCODONE HYDROCHLORIDE 5 MILLIGRAM(S): 5 TABLET ORAL at 00:21

## 2022-01-01 RX ADMIN — PIPERACILLIN AND TAZOBACTAM 25 GRAM(S): 4; .5 INJECTION, POWDER, LYOPHILIZED, FOR SOLUTION INTRAVENOUS at 14:16

## 2022-01-01 RX ADMIN — Medication 500 MILLIGRAM(S): at 12:17

## 2022-01-01 RX ADMIN — Medication 20 MILLIGRAM(S): at 05:55

## 2022-01-01 RX ADMIN — GABAPENTIN 100 MILLIGRAM(S): 400 CAPSULE ORAL at 22:10

## 2022-01-01 RX ADMIN — HEPARIN SODIUM 5000 UNIT(S): 5000 INJECTION INTRAVENOUS; SUBCUTANEOUS at 06:02

## 2022-01-01 RX ADMIN — AMIODARONE HYDROCHLORIDE 200 MILLIGRAM(S): 400 TABLET ORAL at 06:40

## 2022-01-01 RX ADMIN — AMIODARONE HYDROCHLORIDE 200 MILLIGRAM(S): 400 TABLET ORAL at 06:09

## 2022-01-01 RX ADMIN — GABAPENTIN 100 MILLIGRAM(S): 400 CAPSULE ORAL at 21:29

## 2022-01-01 RX ADMIN — Medication 650 MILLIGRAM(S): at 13:28

## 2022-01-01 RX ADMIN — CHLORHEXIDINE GLUCONATE 1 APPLICATION(S): 213 SOLUTION TOPICAL at 05:34

## 2022-01-01 RX ADMIN — Medication 3 CAPSULE(S): at 17:05

## 2022-01-01 RX ADMIN — DULOXETINE HYDROCHLORIDE 60 MILLIGRAM(S): 30 CAPSULE, DELAYED RELEASE ORAL at 13:26

## 2022-01-01 RX ADMIN — Medication 30 MILLILITER(S): at 12:17

## 2022-01-01 RX ADMIN — KETAMINE HYDROCHLORIDE 17.9 MG/KG/HR: 100 INJECTION INTRAMUSCULAR; INTRAVENOUS at 02:03

## 2022-01-01 RX ADMIN — MAGNESIUM OXIDE 400 MG ORAL TABLET 400 MILLIGRAM(S): 241.3 TABLET ORAL at 14:32

## 2022-01-01 RX ADMIN — Medication 650 MILLIGRAM(S): at 19:05

## 2022-01-01 RX ADMIN — Medication 3 CAPSULE(S): at 18:44

## 2022-01-01 RX ADMIN — PANTOPRAZOLE SODIUM 40 MILLIGRAM(S): 20 TABLET, DELAYED RELEASE ORAL at 06:12

## 2022-01-01 RX ADMIN — PIPERACILLIN AND TAZOBACTAM 25 GRAM(S): 4; .5 INJECTION, POWDER, LYOPHILIZED, FOR SOLUTION INTRAVENOUS at 15:29

## 2022-01-01 RX ADMIN — Medication 30 MILLILITER(S): at 23:35

## 2022-01-01 RX ADMIN — AMIODARONE HYDROCHLORIDE 200 MILLIGRAM(S): 400 TABLET ORAL at 06:32

## 2022-01-01 RX ADMIN — PANTOPRAZOLE SODIUM 40 MILLIGRAM(S): 20 TABLET, DELAYED RELEASE ORAL at 07:26

## 2022-01-01 RX ADMIN — Medication 650 MILLIGRAM(S): at 18:44

## 2022-01-01 RX ADMIN — GABAPENTIN 100 MILLIGRAM(S): 400 CAPSULE ORAL at 13:34

## 2022-01-01 RX ADMIN — Medication 3 CAPSULE(S): at 07:56

## 2022-01-01 RX ADMIN — DULOXETINE HYDROCHLORIDE 60 MILLIGRAM(S): 30 CAPSULE, DELAYED RELEASE ORAL at 12:17

## 2022-01-01 RX ADMIN — ENOXAPARIN SODIUM 40 MILLIGRAM(S): 100 INJECTION SUBCUTANEOUS at 12:56

## 2022-01-01 RX ADMIN — SIMETHICONE 80 MILLIGRAM(S): 80 TABLET, CHEWABLE ORAL at 21:28

## 2022-01-01 RX ADMIN — Medication 10 MILLIGRAM(S): at 17:27

## 2022-01-01 RX ADMIN — Medication 3 CAPSULE(S): at 14:12

## 2022-01-01 RX ADMIN — Medication 25 MICROGRAM(S): at 06:34

## 2022-01-01 RX ADMIN — SACUBITRIL AND VALSARTAN 1 TABLET(S): 24; 26 TABLET, FILM COATED ORAL at 06:18

## 2022-01-01 RX ADMIN — BRIMONIDINE TARTRATE 1 DROP(S): 2 SOLUTION/ DROPS OPHTHALMIC at 05:36

## 2022-01-01 RX ADMIN — GABAPENTIN 100 MILLIGRAM(S): 400 CAPSULE ORAL at 12:31

## 2022-01-01 RX ADMIN — OXYCODONE HYDROCHLORIDE 5 MILLIGRAM(S): 5 TABLET ORAL at 18:49

## 2022-01-01 RX ADMIN — Medication 25 MICROGRAM(S): at 05:51

## 2022-01-01 RX ADMIN — Medication 0.5 MILLIGRAM(S): at 16:01

## 2022-01-01 RX ADMIN — Medication 3 CAPSULE(S): at 08:31

## 2022-01-01 RX ADMIN — Medication 30 MILLILITER(S): at 05:16

## 2022-01-01 RX ADMIN — TAMSULOSIN HYDROCHLORIDE 0.4 MILLIGRAM(S): 0.4 CAPSULE ORAL at 21:12

## 2022-01-01 RX ADMIN — HEPARIN SODIUM 5000 UNIT(S): 5000 INJECTION INTRAVENOUS; SUBCUTANEOUS at 07:25

## 2022-01-01 RX ADMIN — Medication 650 MILLIGRAM(S): at 07:47

## 2022-01-01 RX ADMIN — Medication 1: at 11:48

## 2022-01-01 RX ADMIN — SIMETHICONE 80 MILLIGRAM(S): 80 TABLET, CHEWABLE ORAL at 18:21

## 2022-01-01 RX ADMIN — AMIODARONE HYDROCHLORIDE 200 MILLIGRAM(S): 400 TABLET ORAL at 06:54

## 2022-01-01 RX ADMIN — RIVAROXABAN 15 MILLIGRAM(S): KIT at 18:38

## 2022-01-01 RX ADMIN — Medication 650 MILLIGRAM(S): at 13:34

## 2022-01-01 RX ADMIN — SIMETHICONE 80 MILLIGRAM(S): 80 TABLET, CHEWABLE ORAL at 10:29

## 2022-01-01 RX ADMIN — Medication 650 MILLIGRAM(S): at 08:29

## 2022-01-01 RX ADMIN — ONDANSETRON 8 MILLIGRAM(S): 8 TABLET, FILM COATED ORAL at 18:55

## 2022-01-01 RX ADMIN — OXYCODONE HYDROCHLORIDE 5 MILLIGRAM(S): 5 TABLET ORAL at 01:36

## 2022-01-01 RX ADMIN — HEPARIN SODIUM 5000 UNIT(S): 5000 INJECTION INTRAVENOUS; SUBCUTANEOUS at 15:28

## 2022-01-01 RX ADMIN — OXYCODONE HYDROCHLORIDE 5 MILLIGRAM(S): 5 TABLET ORAL at 10:59

## 2022-01-01 RX ADMIN — Medication 400 MILLIGRAM(S): at 06:26

## 2022-01-01 RX ADMIN — OXYCODONE HYDROCHLORIDE 5 MILLIGRAM(S): 5 TABLET ORAL at 16:24

## 2022-01-01 RX ADMIN — QUETIAPINE FUMARATE 25 MILLIGRAM(S): 200 TABLET, FILM COATED ORAL at 23:04

## 2022-01-01 RX ADMIN — Medication 400 MILLIGRAM(S): at 11:00

## 2022-01-01 RX ADMIN — HEPARIN SODIUM 5000 UNIT(S): 5000 INJECTION INTRAVENOUS; SUBCUTANEOUS at 15:09

## 2022-01-01 RX ADMIN — SODIUM CHLORIDE 500 MILLILITER(S): 9 INJECTION INTRAMUSCULAR; INTRAVENOUS; SUBCUTANEOUS at 05:30

## 2022-01-01 RX ADMIN — Medication 0.25 MILLIGRAM(S): at 21:47

## 2022-01-01 RX ADMIN — Medication 0.25 MILLIGRAM(S): at 23:06

## 2022-01-01 RX ADMIN — OXYCODONE HYDROCHLORIDE 5 MILLIGRAM(S): 5 TABLET ORAL at 02:50

## 2022-01-01 RX ADMIN — Medication 400 MILLIGRAM(S): at 11:41

## 2022-01-01 RX ADMIN — DULOXETINE HYDROCHLORIDE 60 MILLIGRAM(S): 30 CAPSULE, DELAYED RELEASE ORAL at 11:56

## 2022-01-01 RX ADMIN — OXYCODONE HYDROCHLORIDE 5 MILLIGRAM(S): 5 TABLET ORAL at 09:52

## 2022-01-01 RX ADMIN — Medication 650 MILLIGRAM(S): at 18:14

## 2022-01-01 RX ADMIN — Medication 3 CAPSULE(S): at 15:31

## 2022-01-01 RX ADMIN — PANTOPRAZOLE SODIUM 40 MILLIGRAM(S): 20 TABLET, DELAYED RELEASE ORAL at 06:54

## 2022-01-01 RX ADMIN — HEPARIN SODIUM 5000 UNIT(S): 5000 INJECTION INTRAVENOUS; SUBCUTANEOUS at 17:04

## 2022-01-01 RX ADMIN — Medication 0.25 MILLIGRAM(S): at 23:08

## 2022-01-01 RX ADMIN — QUETIAPINE FUMARATE 25 MILLIGRAM(S): 200 TABLET, FILM COATED ORAL at 21:11

## 2022-01-01 RX ADMIN — LATANOPROST 1 DROP(S): 0.05 SOLUTION/ DROPS OPHTHALMIC; TOPICAL at 21:21

## 2022-01-01 RX ADMIN — DULOXETINE HYDROCHLORIDE 60 MILLIGRAM(S): 30 CAPSULE, DELAYED RELEASE ORAL at 11:53

## 2022-01-01 RX ADMIN — GABAPENTIN 100 MILLIGRAM(S): 400 CAPSULE ORAL at 13:28

## 2022-01-01 RX ADMIN — PIPERACILLIN AND TAZOBACTAM 25 GRAM(S): 4; .5 INJECTION, POWDER, LYOPHILIZED, FOR SOLUTION INTRAVENOUS at 22:44

## 2022-01-01 RX ADMIN — ENOXAPARIN SODIUM 40 MILLIGRAM(S): 100 INJECTION SUBCUTANEOUS at 11:53

## 2022-01-01 RX ADMIN — Medication 650 MILLIGRAM(S): at 05:26

## 2022-01-01 RX ADMIN — OXYCODONE HYDROCHLORIDE 5 MILLIGRAM(S): 5 TABLET ORAL at 12:30

## 2022-01-01 RX ADMIN — Medication 400 MILLIGRAM(S): at 21:53

## 2022-01-01 RX ADMIN — Medication 30 MILLILITER(S): at 11:18

## 2022-01-01 RX ADMIN — Medication 3 CAPSULE(S): at 09:35

## 2022-01-01 RX ADMIN — TRAMADOL HYDROCHLORIDE 25 MILLIGRAM(S): 50 TABLET ORAL at 21:51

## 2022-01-01 RX ADMIN — Medication 25 MICROGRAM(S): at 07:05

## 2022-01-01 RX ADMIN — Medication 0.25 MILLIGRAM(S): at 21:38

## 2022-01-01 RX ADMIN — Medication 7.03 MICROGRAM(S)/KG/MIN: at 04:03

## 2022-01-01 RX ADMIN — Medication 0.25 MILLIGRAM(S): at 00:24

## 2022-01-01 RX ADMIN — Medication 400 MILLIGRAM(S): at 23:59

## 2022-01-01 RX ADMIN — Medication 101 MILLIGRAM(S): at 10:46

## 2022-01-01 RX ADMIN — PIPERACILLIN AND TAZOBACTAM 200 GRAM(S): 4; .5 INJECTION, POWDER, LYOPHILIZED, FOR SOLUTION INTRAVENOUS at 04:04

## 2022-01-01 RX ADMIN — PIPERACILLIN AND TAZOBACTAM 25 GRAM(S): 4; .5 INJECTION, POWDER, LYOPHILIZED, FOR SOLUTION INTRAVENOUS at 07:57

## 2022-01-01 RX ADMIN — DULOXETINE HYDROCHLORIDE 60 MILLIGRAM(S): 30 CAPSULE, DELAYED RELEASE ORAL at 21:25

## 2022-01-01 RX ADMIN — Medication 25 MICROGRAM(S): at 05:59

## 2022-01-01 RX ADMIN — Medication 650 MILLIGRAM(S): at 05:56

## 2022-01-01 RX ADMIN — Medication 650 MILLIGRAM(S): at 17:51

## 2022-01-01 RX ADMIN — RIVAROXABAN 15 MILLIGRAM(S): KIT at 06:33

## 2022-01-01 RX ADMIN — PIPERACILLIN AND TAZOBACTAM 25 GRAM(S): 4; .5 INJECTION, POWDER, LYOPHILIZED, FOR SOLUTION INTRAVENOUS at 21:18

## 2022-01-01 RX ADMIN — Medication 1 DROP(S): at 05:15

## 2022-01-01 RX ADMIN — DULOXETINE HYDROCHLORIDE 60 MILLIGRAM(S): 30 CAPSULE, DELAYED RELEASE ORAL at 12:59

## 2022-01-01 RX ADMIN — Medication 650 MILLIGRAM(S): at 13:45

## 2022-01-01 RX ADMIN — Medication 3 CAPSULE(S): at 08:20

## 2022-01-01 RX ADMIN — DULOXETINE HYDROCHLORIDE 60 MILLIGRAM(S): 30 CAPSULE, DELAYED RELEASE ORAL at 11:26

## 2022-01-01 RX ADMIN — OXYCODONE HYDROCHLORIDE 10 MILLIGRAM(S): 5 TABLET ORAL at 10:14

## 2022-01-01 RX ADMIN — SACUBITRIL AND VALSARTAN 1 TABLET(S): 24; 26 TABLET, FILM COATED ORAL at 18:22

## 2022-01-01 RX ADMIN — Medication 25 MICROGRAM(S): at 06:21

## 2022-01-01 RX ADMIN — Medication 30 MILLILITER(S): at 11:47

## 2022-01-01 RX ADMIN — GABAPENTIN 100 MILLIGRAM(S): 400 CAPSULE ORAL at 13:07

## 2022-01-01 RX ADMIN — Medication 30 MILLILITER(S): at 06:47

## 2022-01-01 RX ADMIN — Medication 400 MILLIGRAM(S): at 16:47

## 2022-01-01 RX ADMIN — SIMETHICONE 80 MILLIGRAM(S): 80 TABLET, CHEWABLE ORAL at 19:14

## 2022-01-01 RX ADMIN — Medication 25 MICROGRAM(S): at 06:09

## 2022-01-01 RX ADMIN — Medication 50 MILLIGRAM(S): at 05:54

## 2022-01-01 RX ADMIN — Medication 650 MILLIGRAM(S): at 12:13

## 2022-01-01 RX ADMIN — Medication 650 MILLIGRAM(S): at 06:33

## 2022-01-01 RX ADMIN — OXYCODONE HYDROCHLORIDE 5 MILLIGRAM(S): 5 TABLET ORAL at 05:55

## 2022-01-01 RX ADMIN — Medication 650 MILLIGRAM(S): at 11:45

## 2022-01-01 RX ADMIN — TAMSULOSIN HYDROCHLORIDE 0.4 MILLIGRAM(S): 0.4 CAPSULE ORAL at 21:11

## 2022-01-01 RX ADMIN — Medication 20 MILLIGRAM(S): at 05:16

## 2022-01-01 RX ADMIN — Medication 650 MILLIGRAM(S): at 07:35

## 2022-01-01 RX ADMIN — Medication 650 MILLIGRAM(S): at 16:59

## 2022-01-01 RX ADMIN — Medication 50 MILLIGRAM(S): at 07:44

## 2022-01-01 RX ADMIN — Medication 3 CAPSULE(S): at 09:32

## 2022-01-01 RX ADMIN — Medication 650 MILLIGRAM(S): at 14:11

## 2022-01-01 RX ADMIN — AMIODARONE HYDROCHLORIDE 200 MILLIGRAM(S): 400 TABLET ORAL at 06:06

## 2022-01-01 RX ADMIN — Medication 400 MILLIGRAM(S): at 11:01

## 2022-01-01 RX ADMIN — AMIODARONE HYDROCHLORIDE 200 MILLIGRAM(S): 400 TABLET ORAL at 06:10

## 2022-01-01 RX ADMIN — Medication 3 CAPSULE(S): at 12:31

## 2022-01-01 RX ADMIN — TAMSULOSIN HYDROCHLORIDE 0.4 MILLIGRAM(S): 0.4 CAPSULE ORAL at 21:33

## 2022-01-01 RX ADMIN — RIVAROXABAN 20 MILLIGRAM(S): KIT at 17:17

## 2022-01-01 RX ADMIN — Medication 0.25 MILLIGRAM(S): at 18:44

## 2022-01-01 RX ADMIN — Medication 0.25 MILLIGRAM(S): at 22:30

## 2022-01-01 RX ADMIN — Medication 3 CAPSULE(S): at 07:31

## 2022-01-01 RX ADMIN — TRAMADOL HYDROCHLORIDE 25 MILLIGRAM(S): 50 TABLET ORAL at 22:04

## 2022-01-01 RX ADMIN — Medication 650 MILLIGRAM(S): at 06:09

## 2022-01-01 RX ADMIN — Medication 650 MILLIGRAM(S): at 19:56

## 2022-01-01 RX ADMIN — BRIMONIDINE TARTRATE 1 DROP(S): 2 SOLUTION/ DROPS OPHTHALMIC at 17:20

## 2022-01-01 RX ADMIN — GABAPENTIN 100 MILLIGRAM(S): 400 CAPSULE ORAL at 21:11

## 2022-01-01 RX ADMIN — DULOXETINE HYDROCHLORIDE 60 MILLIGRAM(S): 30 CAPSULE, DELAYED RELEASE ORAL at 12:13

## 2022-01-01 RX ADMIN — Medication 30 MILLILITER(S): at 17:19

## 2022-01-01 RX ADMIN — OXYCODONE HYDROCHLORIDE 5 MILLIGRAM(S): 5 TABLET ORAL at 17:48

## 2022-01-01 RX ADMIN — KETAMINE HYDROCHLORIDE 17.9 MG/KG/HR: 100 INJECTION INTRAMUSCULAR; INTRAVENOUS at 08:15

## 2022-01-01 RX ADMIN — SODIUM CHLORIDE 60 MILLILITER(S): 9 INJECTION, SOLUTION INTRAVENOUS at 19:15

## 2022-01-01 RX ADMIN — Medication 1000 MILLIGRAM(S): at 11:35

## 2022-01-01 RX ADMIN — GABAPENTIN 100 MILLIGRAM(S): 400 CAPSULE ORAL at 06:15

## 2022-01-01 RX ADMIN — GABAPENTIN 100 MILLIGRAM(S): 400 CAPSULE ORAL at 21:28

## 2022-01-01 RX ADMIN — Medication 650 MILLIGRAM(S): at 17:05

## 2022-01-01 RX ADMIN — Medication 30 MILLILITER(S): at 00:16

## 2022-01-01 RX ADMIN — GABAPENTIN 100 MILLIGRAM(S): 400 CAPSULE ORAL at 22:07

## 2022-01-01 RX ADMIN — Medication 3 CAPSULE(S): at 11:31

## 2022-01-01 RX ADMIN — SODIUM CHLORIDE 1000 MILLILITER(S): 9 INJECTION, SOLUTION INTRAVENOUS at 16:42

## 2022-01-01 RX ADMIN — GABAPENTIN 100 MILLIGRAM(S): 400 CAPSULE ORAL at 14:16

## 2022-01-01 RX ADMIN — GABAPENTIN 100 MILLIGRAM(S): 400 CAPSULE ORAL at 13:52

## 2022-01-01 RX ADMIN — DULOXETINE HYDROCHLORIDE 60 MILLIGRAM(S): 30 CAPSULE, DELAYED RELEASE ORAL at 11:45

## 2022-01-01 RX ADMIN — HEPARIN SODIUM 5000 UNIT(S): 5000 INJECTION INTRAVENOUS; SUBCUTANEOUS at 19:47

## 2022-01-01 RX ADMIN — MIDODRINE HYDROCHLORIDE 10 MILLIGRAM(S): 2.5 TABLET ORAL at 03:07

## 2022-01-01 RX ADMIN — HEPARIN SODIUM 1300 UNIT(S)/HR: 5000 INJECTION INTRAVENOUS; SUBCUTANEOUS at 02:41

## 2022-01-01 RX ADMIN — APIXABAN 5 MILLIGRAM(S): 2.5 TABLET, FILM COATED ORAL at 05:35

## 2022-01-01 RX ADMIN — TAMSULOSIN HYDROCHLORIDE 0.4 MILLIGRAM(S): 0.4 CAPSULE ORAL at 21:58

## 2022-01-01 RX ADMIN — PROPOFOL 5.37 MICROGRAM(S)/KG/MIN: 10 INJECTION, EMULSION INTRAVENOUS at 05:27

## 2022-01-01 RX ADMIN — Medication 3 CAPSULE(S): at 09:42

## 2022-01-01 RX ADMIN — Medication 650 MILLIGRAM(S): at 17:59

## 2022-01-01 RX ADMIN — TRAMADOL HYDROCHLORIDE 25 MILLIGRAM(S): 50 TABLET ORAL at 16:02

## 2022-01-01 RX ADMIN — Medication 25 MICROGRAM(S): at 05:49

## 2022-01-01 RX ADMIN — SODIUM CHLORIDE 500 MILLILITER(S): 9 INJECTION INTRAMUSCULAR; INTRAVENOUS; SUBCUTANEOUS at 02:34

## 2022-01-01 RX ADMIN — TAMSULOSIN HYDROCHLORIDE 0.4 MILLIGRAM(S): 0.4 CAPSULE ORAL at 21:53

## 2022-01-01 RX ADMIN — FAMOTIDINE 40 MILLIGRAM(S): 10 INJECTION INTRAVENOUS at 23:47

## 2022-01-01 RX ADMIN — GABAPENTIN 100 MILLIGRAM(S): 400 CAPSULE ORAL at 21:32

## 2022-01-01 RX ADMIN — Medication 25 MICROGRAM(S): at 06:43

## 2022-01-01 RX ADMIN — Medication 650 MILLIGRAM(S): at 23:48

## 2022-01-01 RX ADMIN — TAMSULOSIN HYDROCHLORIDE 0.4 MILLIGRAM(S): 0.4 CAPSULE ORAL at 21:02

## 2022-01-01 RX ADMIN — Medication 3 CAPSULE(S): at 08:26

## 2022-01-01 RX ADMIN — Medication 3 CAPSULE(S): at 12:56

## 2022-01-01 RX ADMIN — MIDODRINE HYDROCHLORIDE 10 MILLIGRAM(S): 2.5 TABLET ORAL at 15:09

## 2022-01-01 RX ADMIN — Medication 1 DROP(S): at 05:40

## 2022-01-01 RX ADMIN — Medication 1 DROP(S): at 05:54

## 2022-01-01 RX ADMIN — Medication 0.25 MILLIGRAM(S): at 22:01

## 2022-01-01 RX ADMIN — OXYCODONE HYDROCHLORIDE 5 MILLIGRAM(S): 5 TABLET ORAL at 07:21

## 2022-01-01 RX ADMIN — ALBUTEROL 2.5 MILLIGRAM(S): 90 AEROSOL, METERED ORAL at 13:33

## 2022-01-01 RX ADMIN — MAGNESIUM OXIDE 400 MG ORAL TABLET 400 MILLIGRAM(S): 241.3 TABLET ORAL at 09:21

## 2022-01-01 RX ADMIN — Medication 1000 MILLIGRAM(S): at 04:20

## 2022-01-01 RX ADMIN — Medication 3 CAPSULE(S): at 18:12

## 2022-01-01 RX ADMIN — MIDODRINE HYDROCHLORIDE 5 MILLIGRAM(S): 2.5 TABLET ORAL at 05:35

## 2022-01-01 RX ADMIN — TAMSULOSIN HYDROCHLORIDE 0.4 MILLIGRAM(S): 0.4 CAPSULE ORAL at 21:07

## 2022-01-01 RX ADMIN — Medication 30 MILLILITER(S): at 17:57

## 2022-01-01 RX ADMIN — Medication 126 MICROGRAM(S)/KG/MIN: at 18:17

## 2022-01-01 RX ADMIN — Medication 25 MICROGRAM(S): at 06:11

## 2022-01-01 RX ADMIN — Medication 650 MILLIGRAM(S): at 03:02

## 2022-01-01 RX ADMIN — Medication 650 MILLIGRAM(S): at 21:15

## 2022-01-01 RX ADMIN — Medication 3 CAPSULE(S): at 07:44

## 2022-01-01 RX ADMIN — ONDANSETRON 4 MILLIGRAM(S): 8 TABLET, FILM COATED ORAL at 12:41

## 2022-01-01 RX ADMIN — PANTOPRAZOLE SODIUM 40 MILLIGRAM(S): 20 TABLET, DELAYED RELEASE ORAL at 05:40

## 2022-01-01 RX ADMIN — OXYCODONE HYDROCHLORIDE 5 MILLIGRAM(S): 5 TABLET ORAL at 22:32

## 2022-01-01 RX ADMIN — OXYCODONE HYDROCHLORIDE 5 MILLIGRAM(S): 5 TABLET ORAL at 02:00

## 2022-01-01 RX ADMIN — Medication 650 MILLIGRAM(S): at 11:42

## 2022-01-01 RX ADMIN — Medication 650 MILLIGRAM(S): at 02:33

## 2022-01-01 RX ADMIN — Medication 3 MILLIGRAM(S): at 03:33

## 2022-01-01 RX ADMIN — Medication 650 MILLIGRAM(S): at 14:32

## 2022-01-01 RX ADMIN — OXYCODONE HYDROCHLORIDE 5 MILLIGRAM(S): 5 TABLET ORAL at 23:10

## 2022-01-01 RX ADMIN — Medication 650 MILLIGRAM(S): at 16:33

## 2022-01-01 RX ADMIN — CHLORHEXIDINE GLUCONATE 1 APPLICATION(S): 213 SOLUTION TOPICAL at 06:23

## 2022-01-01 RX ADMIN — QUETIAPINE FUMARATE 25 MILLIGRAM(S): 200 TABLET, FILM COATED ORAL at 21:06

## 2022-01-01 RX ADMIN — PANTOPRAZOLE SODIUM 40 MILLIGRAM(S): 20 TABLET, DELAYED RELEASE ORAL at 05:59

## 2022-01-01 RX ADMIN — SODIUM CHLORIDE 50 MILLILITER(S): 9 INJECTION INTRAMUSCULAR; INTRAVENOUS; SUBCUTANEOUS at 05:56

## 2022-01-01 RX ADMIN — GABAPENTIN 100 MILLIGRAM(S): 400 CAPSULE ORAL at 05:35

## 2022-01-01 RX ADMIN — Medication 650 MILLIGRAM(S): at 22:11

## 2022-01-01 RX ADMIN — VASOPRESSIN 4.5 UNIT(S)/MIN: 20 INJECTION INTRAVENOUS at 16:00

## 2022-01-01 RX ADMIN — Medication 200 MILLILITER(S): at 20:21

## 2022-01-01 RX ADMIN — Medication 650 MILLIGRAM(S): at 06:13

## 2022-01-01 RX ADMIN — Medication 1 APPLICATION(S): at 12:48

## 2022-01-01 RX ADMIN — Medication 1 TABLET(S): at 18:08

## 2022-01-01 RX ADMIN — Medication 3 CAPSULE(S): at 12:17

## 2022-01-01 RX ADMIN — Medication 400 MILLIGRAM(S): at 17:01

## 2022-01-01 RX ADMIN — Medication 3 CAPSULE(S): at 07:40

## 2022-01-01 RX ADMIN — ENOXAPARIN SODIUM 40 MILLIGRAM(S): 100 INJECTION SUBCUTANEOUS at 12:48

## 2022-01-01 RX ADMIN — GABAPENTIN 100 MILLIGRAM(S): 400 CAPSULE ORAL at 21:25

## 2022-01-01 RX ADMIN — DULOXETINE HYDROCHLORIDE 60 MILLIGRAM(S): 30 CAPSULE, DELAYED RELEASE ORAL at 11:27

## 2022-01-01 RX ADMIN — HEPARIN SODIUM 1200 UNIT(S)/HR: 5000 INJECTION INTRAVENOUS; SUBCUTANEOUS at 20:19

## 2022-01-01 RX ADMIN — PANTOPRAZOLE SODIUM 40 MILLIGRAM(S): 20 TABLET, DELAYED RELEASE ORAL at 06:47

## 2022-01-01 RX ADMIN — DULOXETINE HYDROCHLORIDE 60 MILLIGRAM(S): 30 CAPSULE, DELAYED RELEASE ORAL at 13:01

## 2022-01-01 RX ADMIN — Medication 20 MILLIGRAM(S): at 13:45

## 2022-01-01 RX ADMIN — Medication 7.03 MICROGRAM(S)/KG/MIN: at 11:00

## 2022-01-01 RX ADMIN — Medication 20 MILLIGRAM(S): at 13:33

## 2022-01-01 RX ADMIN — GABAPENTIN 100 MILLIGRAM(S): 400 CAPSULE ORAL at 06:58

## 2022-01-01 RX ADMIN — Medication 3 MILLIGRAM(S): at 22:29

## 2022-01-01 RX ADMIN — Medication 50 MILLIGRAM(S): at 06:17

## 2022-01-01 RX ADMIN — Medication 3 CAPSULE(S): at 11:53

## 2022-01-01 RX ADMIN — Medication 0.25 MILLIGRAM(S): at 22:27

## 2022-01-01 RX ADMIN — CALAMINE AND ZINC OXIDE AND PHENOL 1 APPLICATION(S): 160; 10 LOTION TOPICAL at 09:40

## 2022-01-01 RX ADMIN — QUETIAPINE FUMARATE 25 MILLIGRAM(S): 200 TABLET, FILM COATED ORAL at 21:02

## 2022-01-01 RX ADMIN — Medication 20 MILLIGRAM(S): at 05:54

## 2022-01-01 RX ADMIN — BRIMONIDINE TARTRATE 1 DROP(S): 2 SOLUTION/ DROPS OPHTHALMIC at 05:40

## 2022-01-01 RX ADMIN — PIPERACILLIN AND TAZOBACTAM 25 GRAM(S): 4; .5 INJECTION, POWDER, LYOPHILIZED, FOR SOLUTION INTRAVENOUS at 21:11

## 2022-01-01 RX ADMIN — RIVAROXABAN 15 MILLIGRAM(S): KIT at 05:51

## 2022-01-01 RX ADMIN — MIDODRINE HYDROCHLORIDE 10 MILLIGRAM(S): 2.5 TABLET ORAL at 05:59

## 2022-01-01 RX ADMIN — GABAPENTIN 100 MILLIGRAM(S): 400 CAPSULE ORAL at 05:02

## 2022-01-01 RX ADMIN — GABAPENTIN 100 MILLIGRAM(S): 400 CAPSULE ORAL at 06:00

## 2022-01-01 RX ADMIN — AMIODARONE HYDROCHLORIDE 200 MILLIGRAM(S): 400 TABLET ORAL at 05:09

## 2022-01-01 RX ADMIN — Medication 3 CAPSULE(S): at 17:22

## 2022-01-01 RX ADMIN — Medication 20 MILLIGRAM(S): at 06:38

## 2022-01-01 RX ADMIN — SACUBITRIL AND VALSARTAN 1 TABLET(S): 24; 26 TABLET, FILM COATED ORAL at 05:27

## 2022-01-01 RX ADMIN — GABAPENTIN 100 MILLIGRAM(S): 400 CAPSULE ORAL at 21:24

## 2022-01-01 RX ADMIN — APIXABAN 5 MILLIGRAM(S): 2.5 TABLET, FILM COATED ORAL at 17:52

## 2022-01-01 RX ADMIN — Medication 650 MILLIGRAM(S): at 18:15

## 2022-01-01 RX ADMIN — CHLORHEXIDINE GLUCONATE 15 MILLILITER(S): 213 SOLUTION TOPICAL at 05:28

## 2022-01-01 RX ADMIN — PANTOPRAZOLE SODIUM 40 MILLIGRAM(S): 20 TABLET, DELAYED RELEASE ORAL at 06:19

## 2022-01-01 RX ADMIN — OXYCODONE HYDROCHLORIDE 10 MILLIGRAM(S): 5 TABLET ORAL at 15:20

## 2022-01-01 RX ADMIN — Medication 650 MILLIGRAM(S): at 06:03

## 2022-01-01 RX ADMIN — GABAPENTIN 100 MILLIGRAM(S): 400 CAPSULE ORAL at 13:20

## 2022-01-01 RX ADMIN — Medication 25 MICROGRAM(S): at 05:09

## 2022-01-01 RX ADMIN — OXYCODONE HYDROCHLORIDE 5 MILLIGRAM(S): 5 TABLET ORAL at 03:20

## 2022-01-01 RX ADMIN — Medication 3 CAPSULE(S): at 08:19

## 2022-01-01 RX ADMIN — Medication 650 MILLIGRAM(S): at 18:21

## 2022-01-01 RX ADMIN — SACUBITRIL AND VALSARTAN 1 TABLET(S): 24; 26 TABLET, FILM COATED ORAL at 18:33

## 2022-01-01 RX ADMIN — Medication 650 MILLIGRAM(S): at 17:28

## 2022-01-01 RX ADMIN — Medication 20 MILLIGRAM(S): at 05:36

## 2022-01-01 RX ADMIN — Medication 650 MILLIGRAM(S): at 13:07

## 2022-01-01 RX ADMIN — Medication 25 MICROGRAM(S): at 06:26

## 2022-01-01 RX ADMIN — BRIMONIDINE TARTRATE 1 DROP(S): 2 SOLUTION/ DROPS OPHTHALMIC at 17:22

## 2022-01-01 RX ADMIN — Medication 3 MILLIGRAM(S): at 23:49

## 2022-01-01 RX ADMIN — Medication 10 MILLIGRAM(S): at 00:18

## 2022-01-01 RX ADMIN — Medication 650 MILLIGRAM(S): at 22:17

## 2022-01-01 RX ADMIN — ONDANSETRON 8 MILLIGRAM(S): 8 TABLET, FILM COATED ORAL at 11:51

## 2022-01-01 RX ADMIN — Medication 400 MILLIGRAM(S): at 04:03

## 2022-01-01 RX ADMIN — DULOXETINE HYDROCHLORIDE 60 MILLIGRAM(S): 30 CAPSULE, DELAYED RELEASE ORAL at 11:48

## 2022-01-01 RX ADMIN — PANTOPRAZOLE SODIUM 40 MILLIGRAM(S): 20 TABLET, DELAYED RELEASE ORAL at 06:13

## 2022-01-01 RX ADMIN — Medication 1 DROP(S): at 17:58

## 2022-01-01 RX ADMIN — Medication 20 MILLIGRAM(S): at 13:01

## 2022-01-01 RX ADMIN — Medication 50 MILLIGRAM(S): at 05:36

## 2022-01-01 RX ADMIN — ENOXAPARIN SODIUM 85 MILLIGRAM(S): 100 INJECTION SUBCUTANEOUS at 21:05

## 2022-01-01 RX ADMIN — Medication 650 MILLIGRAM(S): at 00:44

## 2022-01-01 RX ADMIN — TAMSULOSIN HYDROCHLORIDE 0.4 MILLIGRAM(S): 0.4 CAPSULE ORAL at 21:47

## 2022-01-01 RX ADMIN — DULOXETINE HYDROCHLORIDE 60 MILLIGRAM(S): 30 CAPSULE, DELAYED RELEASE ORAL at 21:17

## 2022-01-01 RX ADMIN — SACUBITRIL AND VALSARTAN 1 TABLET(S): 24; 26 TABLET, FILM COATED ORAL at 19:00

## 2022-01-01 RX ADMIN — DULOXETINE HYDROCHLORIDE 60 MILLIGRAM(S): 30 CAPSULE, DELAYED RELEASE ORAL at 11:41

## 2022-01-01 RX ADMIN — Medication 0.25 MILLIGRAM(S): at 21:23

## 2022-01-01 RX ADMIN — Medication 25 MICROGRAM(S): at 06:46

## 2022-01-01 RX ADMIN — Medication 1 DROP(S): at 22:33

## 2022-01-01 RX ADMIN — Medication 3 CAPSULE(S): at 08:05

## 2022-01-01 RX ADMIN — BRIMONIDINE TARTRATE 1 DROP(S): 2 SOLUTION/ DROPS OPHTHALMIC at 05:16

## 2022-01-01 RX ADMIN — AMIODARONE HYDROCHLORIDE 200 MILLIGRAM(S): 400 TABLET ORAL at 06:15

## 2022-01-01 RX ADMIN — DULOXETINE HYDROCHLORIDE 60 MILLIGRAM(S): 30 CAPSULE, DELAYED RELEASE ORAL at 11:36

## 2022-01-01 RX ADMIN — HEPARIN SODIUM 1000 UNIT(S)/HR: 5000 INJECTION INTRAVENOUS; SUBCUTANEOUS at 11:29

## 2022-01-01 RX ADMIN — RIVAROXABAN 20 MILLIGRAM(S): KIT at 17:03

## 2022-01-01 RX ADMIN — GABAPENTIN 100 MILLIGRAM(S): 400 CAPSULE ORAL at 13:01

## 2022-01-01 RX ADMIN — OXYCODONE HYDROCHLORIDE 5 MILLIGRAM(S): 5 TABLET ORAL at 09:57

## 2022-01-01 RX ADMIN — GABAPENTIN 100 MILLIGRAM(S): 400 CAPSULE ORAL at 14:38

## 2022-01-01 RX ADMIN — Medication 0.25 MILLIGRAM(S): at 22:34

## 2022-01-01 RX ADMIN — GABAPENTIN 100 MILLIGRAM(S): 400 CAPSULE ORAL at 06:13

## 2022-01-01 RX ADMIN — Medication 25 MICROGRAM(S): at 05:31

## 2022-01-01 RX ADMIN — OXYCODONE HYDROCHLORIDE 5 MILLIGRAM(S): 5 TABLET ORAL at 23:50

## 2022-01-01 RX ADMIN — ONDANSETRON 4 MILLIGRAM(S): 8 TABLET, FILM COATED ORAL at 11:20

## 2022-01-01 RX ADMIN — PANTOPRAZOLE SODIUM 40 MILLIGRAM(S): 20 TABLET, DELAYED RELEASE ORAL at 07:08

## 2022-01-01 RX ADMIN — Medication 1 DROP(S): at 05:36

## 2022-01-01 RX ADMIN — QUETIAPINE FUMARATE 25 MILLIGRAM(S): 200 TABLET, FILM COATED ORAL at 21:25

## 2022-01-01 RX ADMIN — GABAPENTIN 100 MILLIGRAM(S): 400 CAPSULE ORAL at 15:39

## 2022-01-01 RX ADMIN — HEPARIN SODIUM 1000 UNIT(S)/HR: 5000 INJECTION INTRAVENOUS; SUBCUTANEOUS at 11:17

## 2022-01-01 RX ADMIN — Medication 3 CAPSULE(S): at 18:24

## 2022-01-01 RX ADMIN — PIPERACILLIN AND TAZOBACTAM 25 GRAM(S): 4; .5 INJECTION, POWDER, LYOPHILIZED, FOR SOLUTION INTRAVENOUS at 05:27

## 2022-01-01 RX ADMIN — Medication 650 MILLIGRAM(S): at 07:20

## 2022-01-01 RX ADMIN — Medication 1: at 17:23

## 2022-01-01 RX ADMIN — PIPERACILLIN AND TAZOBACTAM 25 GRAM(S): 4; .5 INJECTION, POWDER, LYOPHILIZED, FOR SOLUTION INTRAVENOUS at 09:12

## 2022-01-01 RX ADMIN — Medication 3 CAPSULE(S): at 18:07

## 2022-01-01 RX ADMIN — GABAPENTIN 100 MILLIGRAM(S): 400 CAPSULE ORAL at 07:26

## 2022-01-01 RX ADMIN — ENOXAPARIN SODIUM 85 MILLIGRAM(S): 100 INJECTION SUBCUTANEOUS at 06:16

## 2022-01-01 RX ADMIN — AMIODARONE HYDROCHLORIDE 200 MILLIGRAM(S): 400 TABLET ORAL at 05:50

## 2022-01-01 RX ADMIN — VASOPRESSIN 6 UNIT(S)/MIN: 20 INJECTION INTRAVENOUS at 16:44

## 2022-01-01 RX ADMIN — Medication 10 MILLIGRAM(S): at 09:33

## 2022-01-01 RX ADMIN — Medication 0.25 MILLIGRAM(S): at 21:44

## 2022-01-01 RX ADMIN — FENTANYL CITRATE 50 MICROGRAM(S): 50 INJECTION INTRAVENOUS at 16:42

## 2022-01-01 RX ADMIN — GABAPENTIN 100 MILLIGRAM(S): 400 CAPSULE ORAL at 14:15

## 2022-01-01 RX ADMIN — PIPERACILLIN AND TAZOBACTAM 25 GRAM(S): 4; .5 INJECTION, POWDER, LYOPHILIZED, FOR SOLUTION INTRAVENOUS at 15:10

## 2022-01-01 RX ADMIN — TRAMADOL HYDROCHLORIDE 25 MILLIGRAM(S): 50 TABLET ORAL at 22:10

## 2022-01-01 RX ADMIN — DULOXETINE HYDROCHLORIDE 60 MILLIGRAM(S): 30 CAPSULE, DELAYED RELEASE ORAL at 11:43

## 2022-01-01 RX ADMIN — PANTOPRAZOLE SODIUM 40 MILLIGRAM(S): 20 TABLET, DELAYED RELEASE ORAL at 06:18

## 2022-01-01 RX ADMIN — TAMSULOSIN HYDROCHLORIDE 0.4 MILLIGRAM(S): 0.4 CAPSULE ORAL at 21:10

## 2022-01-01 RX ADMIN — RIVAROXABAN 15 MILLIGRAM(S): KIT at 17:51

## 2022-01-01 RX ADMIN — Medication 25 MICROGRAM(S): at 05:35

## 2022-01-01 RX ADMIN — Medication 0.25 MILLIGRAM(S): at 01:46

## 2022-01-01 RX ADMIN — Medication 650 MILLIGRAM(S): at 11:59

## 2022-01-01 RX ADMIN — HEPARIN SODIUM 5000 UNIT(S): 5000 INJECTION INTRAVENOUS; SUBCUTANEOUS at 06:26

## 2022-01-01 RX ADMIN — HEPARIN SODIUM 5000 UNIT(S): 5000 INJECTION INTRAVENOUS; SUBCUTANEOUS at 05:59

## 2022-01-01 RX ADMIN — MIDODRINE HYDROCHLORIDE 10 MILLIGRAM(S): 2.5 TABLET ORAL at 15:28

## 2022-01-01 RX ADMIN — Medication 25 MICROGRAM(S): at 06:59

## 2022-01-01 RX ADMIN — Medication 3 CAPSULE(S): at 18:28

## 2022-01-01 RX ADMIN — OXYCODONE HYDROCHLORIDE 5 MILLIGRAM(S): 5 TABLET ORAL at 16:48

## 2022-01-01 RX ADMIN — Medication 25 MICROGRAM(S): at 06:17

## 2022-01-01 RX ADMIN — Medication 25 MICROGRAM(S): at 06:39

## 2022-01-01 RX ADMIN — TAMSULOSIN HYDROCHLORIDE 0.4 MILLIGRAM(S): 0.4 CAPSULE ORAL at 21:34

## 2022-01-02 NOTE — HISTORY OF PRESENT ILLNESS
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 \par \par Admitted to [facility] 10/26/21 - 11/15/21. Patient underwent s/p Whipple 10/27, recovered in SICU. Developed fevers. 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 CT 11/3 with Loculated fluid collection adjacent to the afferent jejunal loop in the right upper quadrant.  s/p IR aspiration /drainage. CORBIN without obvious vegetations. EP without plans to remove PPM. Discharged with plan for 6 weeks of Ampicillin and Ceftriaxone ending on 12/17/21. \par \par Since completion of antibiotics notes some improvement in nausea. Denies chills or fevers. Denies diarrhea. PICC line removed in rehab.

## 2022-01-02 NOTE — REVIEW OF SYSTEMS
[Abdominal Pain] : no abdominal pain [Vomiting] : vomiting [Constipation] : no constipation [Diarrhea] : no diarrhea [Negative] : Heme/Lymph

## 2022-01-02 NOTE — ASSESSMENT
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 \par \par Admitted to [facility] 10/26/21 - 11/15/21. \par s/p Whipple 10/27, recovered in SICU. 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 \par CT 11/3 with Loculated fluid collection adjacent to the afferent jejunal loop in the right upper quadrant.  \par s/p IR aspiration /drainage. \par CORBIN without obvious vegetations. \par EP without plans to remove PPM. \par Discharged with plan for 6 weeks of Ampicillin and Ceftriaxone ending on 12/17/21. \par \par Will check 2x surveillance blood cultures\par Will check CBC and CMP\par \par Followup: 1 month

## 2022-01-02 NOTE — REASON FOR VISIT
[Home] : at home, [unfilled] , at the time of the visit. [Medical Office: ([facility])___] : at the medical office located in  [Verbal consent obtained from patient] : the patient, [unfilled] [Follow-Up: _____] : a [unfilled] follow-up visit

## 2022-01-03 NOTE — HISTORY OF PRESENT ILLNESS
[Post-Op Complications] : Post-op complications reported [Intra-abdominal abscess] : intra-abdominal abscess [Wound complications] : wound complications [Invasive drain or angio by CVDL] : Invasive drain or angio by CVDL [FreeTextEntry1] : Mr. JUANIS DE SANTIAGO is a 80 year old who presents for continued post-operative evaluation after a whipple operation 10/26/21. 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 PMH of HTN, HLD, CHF (EF: 35%), CAD s/p stents x2 (years ago), AICD (implanted 2005, extracted and re-implantation 9/2021), TAVR (4/2021), bilateral PE (7/2021) on Xarelto, spinal stenosis, macular degeneration, GERD, BPH, Left THR (x2 revisions), left femur fracture (hardware), osteomyelitis right foot s/p right hallux amputation 9/2021, MSSA bacteremia, PICC Line RUE was on IV antibiotics, changed to PO prior to surgery. \par \par His postoperative course was c/b hypotension, SVT vs. rapid Afib, AMS, and VRE requiring prolonged ICU stay.  Developed a fluid collection s/p IR drainage, wound vac intact and discharged to rehab with IV antibiotics which is due to end on 12/18. They have follow up with ID.\par \par Since arrival at the rehab, had intermittent nausea which is much improved. Today he denies abdominal pain, is taking pain medication if needed.  Today denies nausea, vomiting or diarrhea. His appetite is improving. He looks well today. Midline wound healing and granulating in well. \par \par  97.8

## 2022-01-03 NOTE — ASSESSMENT
[FreeTextEntry1] : Mr. JUANIS DE SANTIAGO is a 80 year old who presents for continued post-operative evaluation after a whipple operation 10/26/21. 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 PMH of HTN, HLD, CHF (EF: 35%), CAD s/p stents x2 (years ago), AICD (implanted 2005, extracted and re-implantation 9/2021), TAVR (4/2021), bilateral PE (7/2021) on Xarelto, spinal stenosis, macular degeneration, GERD, BPH, Left THR (x2 revisions), left femur fracture (hardware), osteomyelitis right foot s/p right hallux amputation 9/2021, MSSA bacteremia, PICC Line RUE was on IV antibiotics, changed to PO prior to surgery. \par \par His postoperative course was c/b hypotension, SVT vs. rapid Afib, AMS, and VRE requiring prolonged ICU stay.  Developed a fluid collection s/p IR drainage discharged to rehab with IV antibiotics which is due to end on 12/18. They have follow up with ID.\par \par Since arrival at the rehab, had intermittent nausea which is much improved. encouraged to advance diet as tolerated. Today he denies abdominal pain, is taking pain medication if needed. Discussed getting off narcotics and can take tylenol and ibuprofen PRN. He looks well today. Midline wound healing and granulating in well. 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 Pancreatic resection margin is positive, grade 3 (poor) response to chemo. Discussed adjuvant radiation and he will see radiation oncology today as well as follow up with medical oncology.  Will plan to see him back in 3 months for a final post op check. \par

## 2022-01-23 NOTE — PHYSICAL EXAM
[Normal] : the sclera and conjunctiva were normal, pupils were equal in size, round, reactive to light and extraocular movements were intact. [de-identified] : ambulating with walker

## 2022-01-23 NOTE — ADDENDUM
[FreeTextEntry1] : I reviewed above and agree.  Patient did well except for some fatigue that improved during the treatment.  He will follow-up with Dr. Juan.\morro ROSENBERG\morro 
No

## 2022-01-23 NOTE — HISTORY OF PRESENT ILLNESS
[FreeTextEntry1] :  81yo M w/ multiple extensive list of comorbidities, previous radiation for testicular cancer aprox 30 years ago and pancreatic cancer s/p [place]/ Abraxane at outside institution and s/p Whipple surgery w/ Dr. Mercer on 10/26/2021. Pathology showing ypT3N0, and positive margins at vascular margin and pancreaetic resection margin. He is now s/p 4000 cGy pancreatic SBRT, presenting for his on-treatment visit.\par \par Today he reports feeling well. Reports mild nausea and mild decrease in appetite since starting treatment, but notes that this is improving at present. Also notes mild fatigue. Denies vomiting, denies diarrhea, or constipation. In regards to abdominal pain, denies spontaneous abdominal pain but notes that with pressure e.g. from applying belt for treatment, or upon palpation of abdomen, he experiences pain. Denies any other acute concerns. His pacemaker was interrogated given treatment now completed.

## 2022-01-23 NOTE — DISEASE MANAGEMENT
[Pathological] : TNM Stage: p [TTNM] : 3 [NTNM] : 0 [MTNM] : 0 [II] : II [de-identified] : 4626 [de-identified] : [id number] [de-identified] : pancreas

## 2022-01-25 PROBLEM — L60.3 DYSTROPHIC NAIL: Status: ACTIVE | Noted: 2022-01-01

## 2022-01-25 PROBLEM — Z80.42 FAMILY HISTORY OF MALIGNANT NEOPLASM OF PROSTATE: Status: ACTIVE | Noted: 2022-01-01

## 2022-01-25 NOTE — HEALTH RISK ASSESSMENT
[Never] : Never [No] : No [Alone] : lives alone [Retired] : retired [] :  [# Of Children ___] : has [unfilled] children [FreeTextEntry2] : Retired 3 years ago [FreeTextEntry3] : 4 daughters and a son

## 2022-01-25 NOTE — PHYSICAL EXAM
[No Edema] : there was no peripheral edema [No Extremity Clubbing/Cyanosis] : no extremity clubbing/cyanosis [Normal Oropharynx] : the oropharynx was normal [No Lymphadenopathy] : no lymphadenopathy [Soft] : abdomen soft [Non Tender] : non-tender [Non-distended] : non-distended [Normal] : affect was normal and insight and judgment were intact [de-identified] : mild cerumen; not impacted; hearing aids [de-identified] : abdominal dressing c/d/i [de-identified] : Right great toe amputated; dystrophic nails

## 2022-01-25 NOTE — HISTORY OF PRESENT ILLNESS
[Family Member] : family member [FreeTextEntry1] : Mr. DE SANTIAGO presents for annual physical.\par  [de-identified] : Mr. DE SANTIAGO presents for annual physical.\par Here with son.  \par Hx of pancreatic cancer; s/p recent Whipple Procedure-long hospitalization and rehab stay.  Was at [facility] for ~2 months.  Has bee home for a few weeks. \par Dr. Lee outside of [facility]- March of 2021. \par \par Hx of Neuropathy-feet, toe amputation and hx of osteomyelitis.\par \par \par Seeing Cardio this Thursday.  \par Reports he has had Diarrhea on and off. \par \par Visiting RN 1x a week, PT 1x a week \par Ambulates with Rolling walker. \par Discharged home from [facility] rehab 3 weeks ago. \par 3 hours a day with the aide; helping with dressing and showering. \par \par Fam Hx: Father-prostate cancer\par Mom passed at age 102; heart condition\par \par No falls since being home. \par Two main meals per day. \par \par Previous doctor in  moved to [place].  Has been on Xanax prn for anxiety.\par

## 2022-01-25 NOTE — PLAN
[FreeTextEntry1] : Will follow up labwork drawn in office today.\par \par Needs to make GI follow-up.\par Has upcoming Cardio appointment.\par Following with wound care and surgeon. \par \par Dystrophic nails-Podiatry eval placed.\par I-STOP checked. Precautions discussed, need for routine follow-up in office every 3 months.\par \par Discussed with Mr. DE SANTIAGO precautions and advised to go to seek immediate medical re-evaluation if condition worsened.  Mr. JUANIS DE SANTIAGO expressed understanding of the plan.\par

## 2022-01-25 NOTE — REVIEW OF SYSTEMS
[Diarrhea] : diarrhea [Negative] : Respiratory [Fever] : no fever [Chills] : no chills [Chest Pain] : no chest pain [Palpitations] : no palpitations [Paroxysmal Nocturnal Dyspnea] : no paroxysmal nocturnal dyspnea [Vomiting] : no vomiting [Heartburn] : no heartburn [Headache] : no headache [Dizziness] : no dizziness [FreeTextEntry7] : diarrhea-loose stools every other day no blood in the toilet; flatus [de-identified] : 4-5 hours of sleep per night

## 2022-02-03 PROBLEM — Z89.411 ACQUIRED ABSENCE OF GREAT TOE, RIGHT: Status: ACTIVE | Noted: 2022-01-01

## 2022-02-03 PROBLEM — R11.0 NAUSEA: Status: ACTIVE | Noted: 2022-01-01

## 2022-02-03 NOTE — REVIEW OF SYSTEMS
[Fever] : no fever [Chills] : no chills [Eye Pain] : no eye pain [Shortness Of Breath] : no shortness of breath [Abdominal Pain] : no abdominal pain [Vomiting] : no vomiting [Skin Wound] : skin wound [FreeTextEntry3] : glaucoma, macular degeneration [FreeTextEntry5] : cardiac defibrillator, htn, hld [FreeTextEntry9] : s/p right hallux and 1st metatarsal head amputation, bilateral hammertoes [de-identified] : right hallux and 1st metatarsal head amputation site healed, right 2nd dorsal PIPJ ulcer down to skin, no erythema, no purulence, no malodor, no proximal streaking [de-identified] : lumbar radiculopathy, sciatica, spinal stenosis [de-identified] : pancreatic cancer

## 2022-02-03 NOTE — PLAN
[FreeTextEntry1] : Patient examined and evaluated at this time.\par Continue local wound care and offloading. Discussed appropriate shoegear and the need to alleviate any pressure to the right 2nd digit.\par Pt advised regarding mechanical pressure and discussed with patient and daughter regarding conservative and surgical treatment plan. pt would benefit from right 2nd digit flexor tenotomy with possible PIPJ arthroplasty. Pt to obtain right foot xray and will auth for right foot mri and vascular studies.\par Spent 30 minutes for patient care and medical decision making.\par Patient to follow up in 1 week.\par

## 2022-02-03 NOTE — ASSESSMENT
[Verbal] : Verbal [Written] : Written [Demo] : Demo [Patient] : Patient [Good - alert, interested, motivated] : Good - alert, interested, motivated [Demonstrates independently] : demonstrates independently [Dressing changes] : dressing changes [Foot Care] : foot care [Skin Care] : skin care [Signs and symptoms of infection] : sign and symptoms of infection [Venous Disease] : venous disease [Nutrition] : nutrition [How and When to Call] : how and when to call [Labs and Tests] : labs and tests [Off-loading] : off-loading [Patient responsibility to plan of care] : patient responsibility to plan of care [] : Yes [Stable] : stable [Home] : Home [Cane] : Cane [Not Applicable - Long Term Care/Home Health Agency] : Long Term Care/Home Health Agency: Not Applicable [Surgery] : surgery [FreeTextEntry2] : Infection prevention \par Wound care (dressing changes)\par Maintain optimal skin integrity to high pressure areas\par Offloading measures\par vascular consult/vascular studies\par MRI/Xray\par Surgery [FreeTextEntry3] : Initial Visit [FreeTextEntry4] : Vascular consult next visit (new eval protocol). Pt and Pt's spouse made aware to not schedule appointment with , rather, office management will contact Pt to schedule appointment.\par Vascular studies ordered. Authorization submitted for same.\par MRI ordered. Authorization submitted for same.Pt has a defibrillator. Pt to make radiology department aware when Pt is contacted by radiology department. \par Xray ordered.\par Topics discussed: Surgical intervention of the right foot 2nd digit hammer toe deformity (tenotomy & arthroplasty), labs & tests (MRI, Vascular, & Radiographs), and offloading measures (shoes with width at the forefoot of the foot)\par Authorization submitted for tenotomy, Right Foot 2nd Digit\par Authorization submitted for arthroplasty, Right Foot 2nd Digit\par F/u with Srinivas next visit

## 2022-02-03 NOTE — PHYSICAL EXAM
[2 x 2] : 2 x 2  [2+] : left 2+ [Ankle Swelling (On Exam)] : not present [Varicose Veins Of Lower Extremities] : not present [] : not present [Skin Ulcer] : ulcer [de-identified] : A&Ox3, NAD [de-identified] : HTN, HLD , pacemaker  [de-identified] : 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 [de-identified] : right hallux and 1st metatarsal head amputation site healed, right 2nd dorsal PIPJ ulcer down to skin, no erythema, no purulence, no malodor, no proximal streaking [de-identified] : diminished light touch sensation bilaterally [de-identified] : CIRCULATION\par Dorsalis Pedis: R Palpable, Regular, 2+ L Palpable, Regular, 2+\par Posterior Tibialis: R Palpable, Regular, 2+ L Palpable, Regular, 2+\par Extremity Color: Pigmented\par Extremity Temperature: Warm\par Capillary Refill: > 3 seconds bilaterally\par Vascular studies ordered by Dr ROBBIN cain sheet submitted\par  [FreeTextEntry1] : Right Foot 2nd Digit [FreeTextEntry2] : 0.2 [FreeTextEntry3] : 0.1 [FreeTextEntry4] : 0.1 [de-identified] : 100% [de-identified] : None [de-identified] : Dry Dressing for Protection [TWNoteComboBox4] : None [TWNoteComboBox5] : No [TWNoteComboBox6] : Traumatic [de-identified] : No [de-identified] : Erythema [de-identified] : None [de-identified] : Daily [de-identified] : Secondary Dressing

## 2022-02-07 PROBLEM — M86.9 OSTEOMYELITIS OF GREAT TOE OF RIGHT FOOT: Status: ACTIVE | Noted: 2022-01-01

## 2022-02-07 PROBLEM — R11.2 NAUSEA & VOMITING: Status: ACTIVE | Noted: 2022-01-01

## 2022-03-03 PROBLEM — K46.9 HERNIA, ABDOMINAL: Status: ACTIVE | Noted: 2022-01-01

## 2022-03-08 NOTE — REASON FOR VISIT
[Follow-Up Visit] : a follow-up visit for [Pancreatic Cancer] : pancreatic cancer [Family Member] : family member

## 2022-03-11 NOTE — HISTORY OF PRESENT ILLNESS
[FreeTextEntry1] : 80 yo WM, here as a new patient/evaluation for a chronic nonhealing anterior abdominal wound. Pt states he was dx with pancreatic CA in 2021 and underwent 6 mos of chemotherapy. This was followed by a Whipple procedure  in 11/21. According to the pt., the surgical incision was left open to close on its own. Pt also underwent a course of external radiation. Since then, the postsurgical wound has failed to heal and pt referred here for further evaluation and consideration for HBO tx.  Pt also had had a wound vac placed for 6 wks in Dec.to Jan. 2022.\par     I discussed the tx option of HBO with pt specifically for STRN. Pt also given an orientation for HBO today. Other tx options discussed with pt included plastic surgical grafting or flap, but pt informed that the plastic surgeon most likely want pt to receive HBO tx prior to any consideration for plastic closure.\par    Pt also noted to have a card pacemaker/defibrillator/ hearing aid and being tx for macular degeneration at this time. Pt informed that the card pacemaker will need to be researched regarding its capabilty with HBO.\par    Pt had a recent CAT scan of the chest-wnl except small stable nodule.

## 2022-03-11 NOTE — PHYSICAL EXAM
[4 x 4] : 4 x 4  [Abdominal Pad] : Abdominal Pad [JVD] : no jugular venous distention  [Normal Thyroid] : the thyroid was normal [Normal Breath Sounds] : Normal breath sounds [Normal Heart Sounds] : normal heart sounds [Normal Rate and Rhythm] : normal rate and rhythm [Varicose Veins Of Lower Extremities] : not present [Ankle Swelling (On Exam)] : not present [] : not present [Abdomen Masses] : No abdominal massess [Abdomen Tenderness] : ~T ~M No abdominal tenderness [Tender] : nontender [Enlarged] : not enlarged [Alert] : alert [Oriented to Person] : oriented to person [Oriented to Place] : oriented to place [Oriented to Time] : oriented to time [Calm] : calm [de-identified] : elderly WM, NAD, alert, Ox3. [de-identified] : open, large postsurgical wound noted, superficial; new epithel forming along the margins; no edema/erythema/cellulitis [de-identified] : Intact  [de-identified] : Marlee  [de-identified] : Cleansed with Normal Saline.  [TWNoteComboBox4] : Moderate [TWNoteComboBox6] : Traumatic [de-identified] : Erythema [de-identified] : 3x Weekly [TWNoteComboBox9] : Left [de-identified] : Secondary Dressing [de-identified] : None [de-identified] : Erythema [de-identified] : Pressure [de-identified] : Left [de-identified] : None [de-identified] : Erythema [de-identified] : Pressure

## 2022-03-11 NOTE — PLAN
[FreeTextEntry1] : Get authorization for HBO\par Evaluate card pacemaker/defibrill.\par shweta, DD\par f/u 1 wk\par \par time spent 45 mins.

## 2022-03-11 NOTE — ASSESSMENT
[Verbal] : Verbal [Written] : Written [Patient] : Patient [Good - alert, interested, motivated] : Good - alert, interested, motivated [Dressing changes] : dressing changes [Skin Care] : skin care [Signs and symptoms of infection] : sign and symptoms of infection [Surgery] : surgery [Nutrition] : nutrition [How and When to Call] : how and when to call [Labs and Tests] : labs and tests [Off-loading] : off-loading [Patient responsibility to plan of care] : patient responsibility to plan of care [Stable] : stable [Home] : Home [Demo] : Demo [Family member] : Family member [Verbalizes knowledge/Understanding] : Verbalizes knowledge/understanding [Pressure relief] : pressure relief [Walker] : Walker [] : No [FreeTextEntry2] : HBOT\par Localized Wound Care \par Infection Prevention \par Collagen Matrix Therapy  [FreeTextEntry4] : Pt had Whipple surgery in November for Pancreatic Cancer. Pt had Chemotherapy prior to surgery. Pt completed radiation.\par Pt received HBOT orientation at today's visit to [facility] for Soft Tissue- delayed radiation injury.\par Pt completed HBOT Consent, watched video, TM Jerry, HBOT Checklist, HBOT 2.4 Order, Unit clerk was given Pt's pacemaker card. Submitted paperwork for Authorization.\par Pt to F/U to [facility] in 1 Week or sooner for HBOT

## 2022-03-11 NOTE — VITALS
[de-identified] : 5 [] : No [FreeTextEntry3] : Abdomen [FreeTextEntry1] : laying down- garment [FreeTextEntry2] : moving [FreeTextEntry4] : tylenol

## 2022-03-17 PROBLEM — S90.414D: Status: ACTIVE | Noted: 2022-01-01

## 2022-03-17 PROBLEM — C25.9 PANCREAS CANCER: Status: ACTIVE | Noted: 2021-08-04

## 2022-03-17 NOTE — H&P ADULT - ATTENDING COMMENTS
80 year old male with PMH pancreatic cancer (dx 3/2021, chemo) s/p ERCP s/p whipple 3/2021 and 10/26/21, HTN, HLD, HFrEF (Oct 2021 EF: 35%), CAD s/p stents x2 (~15 years ago), Vtach with AICD (implanted 2005, extracted and re-implantation 9/2021 and 10/4/21), TAVR (4/2021), bilateral PE (7/2021) on Xarelto, spinal stenosis, macular degeneration, GERD, BPH, Left THR (x2 revisions), left femur fracture (hardware), Osteomyelitis s/p R hallux amputation 9/2021, MSSA/VRE bacteremia admitted for infected wound of RLE 2nd digit. Plan: cont iv antibx, apprec ID, vascular and podiatry recs, bone scan penidng, change AC pending need for surgical intervention, apprec cardio recs and optimization as needed

## 2022-03-17 NOTE — ED ADULT TRIAGE NOTE - CHIEF COMPLAINT QUOTE
Patient arrives a&o c/o right foot infection. Patient was directed to ER by resident Dr. Villafana. Patient reports swelling noted to right foot as well. (1) Oriented to own ability

## 2022-03-17 NOTE — H&P ADULT - HISTORY OF PRESENT ILLNESS
80 year old male with PMH pancreatic cancer (dx 3/2021, chemo) s/p ERCP s/p whipple 3/2021 and 10/26/21, HTN, HLD, HFrEF (Oct 2021 EF: 35%), CAD s/p stents x2 (~15 years ago), Vtach with AICD (implanted 2005, extracted and re-implantation 9/2021 and 10/4/21), TAVR (4/2021), bilateral PE (7/2021) on Xarelto, spinal stenosis, macular degeneration, GERD, BPH, Left THR (x2 revisions), left femur fracture (hardware), Osteomyelitis s/p R hallux amputation 9/2021, MSSA/VRE bacteremia presenting with wound of RLE 2nd digit. Patient follows with wound care for open wound of abdominal wall (present since Whipple in Oct 2021) and was scheduled for hyperbaric O2 treatment tomorrow when he noticed wound. Patient went to wound care center today for evaluation, who recommended he come to ER. At baseline, he does not have sensation in b/l LE. Denies any inciting trauma to digit. Patient denies any fevers, chills, purulent discharge at home.    ED Course:   97.3, 127/82, 67, 16, 100% RA   WBC wnl, Hb 9.9, ESR 56  Given: vanc, zosyn, 1L NS bolus

## 2022-03-17 NOTE — H&P ADULT - PROBLEM SELECTOR PLAN 1
Sent by wound care for ulceration of RLE 2nd digit  Sep 2021 episode of osteomyelitis of R hallux s/p amputation   Afebrile, no leukocytosis, Lactate wnl, elevated ESR  Start empiric abx coverage with vancomycin, zosyn  Tylenol PRN for fevers  f/u CRP  f/u blood cultures  Check bone scan - patient cannot have MRI due to AICD   f/u RLE xrays  WBAT  Surgical intervention pending results of above  Podiatry consulted, f/u recs  ID Dr Bell consulted, appreciate recs  Vascular consulted, appreciate recs Sent by wound care for ulceration of RLE 2nd digit  Sep 2021 episode of osteomyelitis of R hallux s/p amputation   Afebrile, no leukocytosis, Lactate wnl, elevated ESR  Start empiric abx coverage with vancomycin  Tylenol PRN for fevers  f/u CRP  f/u blood cultures  Check bone scan - patient cannot have MRI due to AICD   f/u RLE xrays  WBAT  Surgical intervention pending results of above  Podiatry consulted, f/u recs  ID Dr Bell consulted, appreciate recs  Vascular consulted, appreciate recs Sent by wound care for ulceration of RLE 2nd digit  Sep 2021 episode of osteomyelitis of R hallux s/p amputation   Afebrile, no leukocytosis, Lactate wnl, elevated ESR  Start empiric abx coverage with vancomycin, cefepime  Tylenol PRN for fevers  f/u CRP  f/u blood cultures  Check bone scan - patient cannot have MRI due to AICD   f/u RLE xrays  WBAT  Surgical intervention pending results of above  Podiatry consulted, f/u recs  ID Dr Bell consulted, appreciate recs  Vascular consulted, appreciate recs

## 2022-03-17 NOTE — HISTORY OF PRESENT ILLNESS
[FreeTextEntry1] : Pt seen for follow up of right 2nd dorsal PIPJ ulcer down to skin, as well as a new left 2nd dorsal PIPJ ulcer down to skin. Pt also presents with bilateral hammertoe deformities. Pt relates that he has been trying to wear comfortable shoes to prevent irritation to the toes.\par \par patient presented because of abdominal wound and wound right 2nd toe that has degenerated and become infected. HBO will start once he has finished inpatient treatment for right 2nd toe infection

## 2022-03-17 NOTE — ED PROVIDER NOTE - ATTENDING CONTRIBUTION TO CARE
This was a shared visit with PACO. I reviewed and verified the documentation and independently performed the documented MDM.

## 2022-03-17 NOTE — ASSESSMENT
[FreeTextEntry1] : 79 y/o man with resected pancreatic cancer, margin positive disease, who had completed 5-months of D1/D8/D15 gemcitabine+nab-paclitaxel neoadjuvant therapy; and PMHx of extensive cardiovascular disease, presenting to medical oncology.\par \par He completed 5 months of chemotherapy in the adjuvant setting. The positive margin confers a high risk of local recurrence so should be evaluated by radiation oncology for consideration of RT to the resection bed. He has not yet had a post-op baseline scan, which should be done, to review for local and distant disease. If he is found to have measurable metastatic disease, I would consider starting chemotherapy. However, at this point I will refrain from continuing his neoadjuvant regimen to complete a 6th cycle of adjuvant since his performance status is in the ECOG 2-3 range, the pathologic response was poor, and he did not tolerate chemotherapy that well. Otherwise, I will plan to see him for surveillance and start chemotherapy if he has radiologic evidence of disease.\par \par He tolerated SBRT well, now KRIS by imaging.\par \par Plan:\par -- Plan for repeat CT CAP in 6/2022\par -- RV in 3 months with pre visit labs\par -- Patient to take 3 pills instead of 2 pills TID of creon for 2-3 days. Will call and let us know if this is working for this diarrhea. If yes, then plan to change and renew Rx of Creon accordingly \par -- Signatera testing to be done today\par -- Tumor markers to be checked today\par -- Order pre-visit labs and CT CAP for June 2022\par \par Jaren Snyder PGY5

## 2022-03-17 NOTE — CONSULT NOTE ADULT - PROBLEM SELECTOR RECOMMENDATION 9
Pt seen and evaluated at [facility] and sent to the ED for admission.  WBC 5.23, ESR pending, CRP pending, HA1c pending.  Obtained right foot wound culture in the [facility]- f/u results.  Cleansed area and applied DSD to the right foot.  ~  Please obtain ESR, CRP and HA1c.  Please obtain right foot XR, 3 views.  Will order bone scan to r/o osteomyelitis and pt cannot have an MRI (pacemaker and hardware in the body).  ~  Please start broad spectrum IV abx and obtain offical ID recs.  Pt can be weight bearing as tolerated in a surgical shoe and with a rolling walker.  Current plan is to start IV abx and obtain full work-up. Possible surgical intervention pending results.  Pt will be followed by Podiatry while in house. Pt seen and evaluated at the [facility] and sent to the ED for admission.  WBC 5.23, ESR pending, CRP pending, HA1c pending.  Obtained right foot wound culture in the [facility]- f/u results.  Cleansed area and applied DSD to the right foot.  ~  Please obtain ESR, CRP and HA1c.  Please obtain right foot XR, 3 views.  Bone scan to r/o osteomyelitis and pt cannot have an MRI (pacemaker and hardware in the body).  ~  Please start broad spectrum IV abx and obtain offical ID recs.  Pt can be weight bearing as tolerated in a surgical shoe and with a rolling walker.  Current plan is to start IV abx and obtain full work-up. Possible surgical intervention pending results.  Pt will be followed by Podiatry while in house. Pt seen and evaluated at the [facility] and sent to the ED for admission.  WBC 5.23, ESR pending, CRP pending, HA1c pending.  Obtained right foot wound culture in the [facility]- f/u results.  Cleansed area and applied DSD to the right foot.  ~  Please obtain ESR, CRP and HA1c.  Please obtain right foot XR, 3 views.  Bone scan to r/o osteomyelitis as pt cannot have an MRI (pacemaker and hardware in the body).  ~  Please start broad spectrum IV abx and obtain offical ID recs.  Pt can be weight bearing as tolerated in a surgical shoe and with a rolling walker.  Current plan is to start IV abx and obtain full work-up. Possible surgical intervention pending results.  Pt will be followed by Podiatry while in house. Pt seen and evaluated at the [facility] and sent to the ED for admission.  WBC 5.23, ESR pending, CRP pending, HA1c pending.  Obtained right foot wound culture in the [facility]- f/u results.  Cleansed area and applied DSD to the right foot.  ~  Please obtain ESR, CRP and HA1c.  Please obtain right foot XR, 3 views.  Bone scan to r/o osteomyelitis as pt cannot have an MRI (pacemaker and hardware in the body).  ~  Please start broad spectrum IV abx and obtain offical ID recs.  Pt can be weight bearing as tolerated in a surgical shoe and with a rolling walker.  Current plan is to start IV abx and obtain full work-up. Possible surgical intervention pending results.  Pt is high risk for sepsis, limb loss and death.  Pt will be followed by Podiatry while in house.

## 2022-03-17 NOTE — ED PROVIDER NOTE - NSICDXPASTMEDICALHX_GEN_ALL_CORE_FT
PAST MEDICAL HISTORY:  Acute osteomyelitis     Cardiomyopathy     GERD (gastroesophageal reflux disease)     HLD (hyperlipidemia)     [place] (hard of hearing) B/L hearing aids    HTN (hypertension)     MSSA bacteremia 9/2021    Pancreas cancer chemo    Pulmonary embolism

## 2022-03-17 NOTE — H&P ADULT - NSICDXPASTMEDICALHX_GEN_ALL_CORE_FT
PAST MEDICAL HISTORY:  Acute osteomyelitis     Cardiomyopathy     GERD (gastroesophageal reflux disease)     HLD (hyperlipidemia)     [place] (hard of hearing) B/L hearing aids    HTN (hypertension)     MSSA bacteremia 9/2021    Pancreas cancer chemo, s/p Whipple    Pulmonary embolism

## 2022-03-17 NOTE — ED ADULT NURSE NOTE - OBJECTIVE STATEMENT
Pt received in bed alert and oriented and resting in bed with the c/o right 2nd toes infected wound. Pt failed PO antibiotics. As per MD's orders IV anil placed blood specimen obtained and sent to the lab. Meds given and tolerated well. Nursing ongoing and safety Pt received in bed alert and oriented and resting in bed with the c/o right 2nd toes infected wound. Pt failed PO antibiotics. As per MD's orders IV anil placed blood specimen obtained and sent to the lab. Meds given and tolerated well. Nursing ongoing and safety maintained. Pt had pancreas removed due to CA recently. Pt also had great toe removed from right foot due to the same issue that he is experiencing now as per pt.

## 2022-03-17 NOTE — PHYSICAL EXAM
[2+] : left 2+ [Skin Ulcer] : ulcer [Ankle Swelling (On Exam)] : not present [Varicose Veins Of Lower Extremities] : not present [] : not present [de-identified] : A&Ox3, NAD [de-identified] : HTN, HLD , pacemaker  [de-identified] : 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 [de-identified] : right hallux and 1st metatarsal head amputation site healed, bilateral 2nd dorsal PIPJ ulcer down to skin, no erythema, no purulence, no malodor, no proximal streaking [de-identified] : diminished light touch sensation bilaterally [de-identified] : No treatment  [de-identified] : Left dorsal 2nd digit - No open wounds  [de-identified] : no treatment  [FreeTextEntry1] : abdomen - Not accessed due to pt being transferred to ER [FreeTextEntry7] : Right dorsal foot 2nd digit (New)  [FreeTextEntry8] : 0.7 [FreeTextEntry9] : 0.5 [de-identified] : to bone  [de-identified] : Serous/sanguinous [de-identified] : 5% [de-identified] : D [de-identified] : dry dressing applied byMD [de-identified] : Small [de-identified] : Erythema [de-identified] : None [de-identified] : None [de-identified] : >75% [de-identified] : Yes

## 2022-03-17 NOTE — H&P ADULT - PROBLEM SELECTOR PLAN 5
Hx of Vtach during previous admission at [facility] for Whipple  Has AICD in place (reimplanted Oct 2021 due to bacteremia)  Continue home amiodarone

## 2022-03-17 NOTE — ED PROVIDER NOTE - OBJECTIVE STATEMENT
pt is a 82yo male with pmhx of PE?, afib? on AC, AICD, htn, pancreatic cancer s/p whipple 11/2021 presents with foot infection. pt reports right foot 2nd digit infection. pt was seen at wound care center today who referred pt to ed for eval and admission. pt did not take anything for symptoms. pt denies fever, chills,cp, sob.

## 2022-03-17 NOTE — H&P ADULT - NSHPREVIEWOFSYSTEMS_GEN_ALL_CORE
CONSTITUTIONAL: denies fever, chills, fatigue, weakness  HEENT: denies blurred vision, sore throat  SKIN: +R 2nd digit ulceration, +abdominal w  CARDIOVASCULAR: denies chest pain, chest pressure, palpitations  RESPIRATORY: denies shortness of breath, sputum production  GASTROINTESTINAL: denies nausea, vomiting, diarrhea, abdominal pain  GENITOURINARY: denies dysuria, discharge  NEUROLOGICAL: denies numbness, headache, focal weakness  MUSCULOSKELETAL: denies new joint pain, muscle aches  HEMATOLOGIC: denies gross bleeding, bruising  LYMPHATICS: denies enlarged lymph nodes, extremity swelling  PSYCHIATRIC: denies recent changes in anxiety, depression  ENDOCRINOLOGIC: denies sweating, cold or heat intolerance

## 2022-03-17 NOTE — H&P ADULT - NSICDXPASTSURGICALHX_GEN_ALL_CORE_FT
PAST SURGICAL HISTORY:  Benign testicular tumor radiation    H/O Whipple procedure 2/2021, 10/2021    History of laminectomy X3    History of total hip replacement left X 1, 2 revisions    ICD (implantable cardiac defibrillator) in place implanted 2005, replaced 10/4/2021 [employer] Subcutaneous ICD    S/P TAVR (transcatheter aortic valve replacement) 7/2021    Status post amputation of toe of right foot 8/2021    Status post fracture of femur 2017 left with hardware

## 2022-03-17 NOTE — H&P ADULT - PROBLEM SELECTOR PLAN 2
Oct 2021 EF 35% with global LV dysfunction  Continue home lasix   Continue home metoprolol  f/u CXR  Patient has AICD - not MRI compatible Oct 2021 EF 35% with global LV dysfunction  Continue home lasix   Continue home metoprolol  f/u CXR  Patient has AICD - not MRI compatible  apprec cardio consult and poss optimization

## 2022-03-17 NOTE — ED PROVIDER NOTE - SKIN TEMP
+ ulceration right foot 2nd digit dorsum with erythema and maceration. missing 1st digit. all other digits intact decreased sensation NVI/warm/dry

## 2022-03-17 NOTE — ED PROVIDER NOTE - NSICDXPASTSURGICALHX_GEN_ALL_CORE_FT
PAST SURGICAL HISTORY:  Benign testicular tumor radiation    H/O Whipple procedure 2/2021    History of laminectomy X3    History of total hip replacement left X 1, 2 revisions    ICD (implantable cardiac defibrillator) in place implanted 2005, replaced 10/4/2021 [employer] Subcutaneous ICD    S/P TAVR (transcatheter aortic valve replacement) 7/2021    Status post amputation of toe of right foot 8/2021    Status post fracture of femur 2017 left with hardware

## 2022-03-17 NOTE — H&P ADULT - PROBLEM SELECTOR PLAN 6
s/p Whipple x2, most recent Oct 2021  Continue home pancrelipase  Abdominal wound in midline, receives hyperbaric O2 therapy  Wound care consulted, appreciate recs

## 2022-03-17 NOTE — CONSULT NOTE ADULT - PROBLEM SELECTOR RECOMMENDATION 2
SUZANNE/PVR studies from Feb 2022 reviewed: R SUZANNE 1.12, L SUZANNE 0.96. No segmental arterial pressure gradient is detected. Toe brachial index measures 0.66 on the left. It is unobtainable on the right due to amputation. PVR waveforms are normal in amplitude and pulsatility through the metatarsal levels. Left digital waveform is mildly diminished in   amplitude. No Doppler evidence of hemodynamically significant arterial inflow limitation in either lower extremity.  Will obtain official vascular recs.

## 2022-03-17 NOTE — ED PROVIDER NOTE - CLINICAL SUMMARY MEDICAL DECISION MAKING FREE TEXT BOX
miguel chao pt with right foot/toe infection. will do labs per sepsis, ekg, x rays, ivf, abx, admit miguel chao pt with right foot/toe infection. will do labs per sepsis, ekg, x rays, ivf, abx, admit    KV: Sent in from wound care for admission for treatment of toe infection.

## 2022-03-17 NOTE — HISTORY OF PRESENT ILLNESS
[de-identified] : 79 y/o man with resected pancreatic cancer, margin positive disease, who had completed 5-months of D1/D8/D15 gemcitabine+nab-paclitaxel neoadjuvant therapy; and PMHx of extensive cardiovascular disease & osteomyelitis, presenting to medical oncology.\par \par Chronological Cancer History:\par 03/02/21 CT AP for abdominal pain & jaundice showed CBD dilitation at head of pancreas\par 03/05/21 EUS showed pancreatic mass\par 03/12/21 CT CAP at [facility] [place] [facility] -> staged as resectable disease, planned for Whipple but not cleared from cardiac standpoint\par 03/24/21 underwent TAVR\par 04/15/21 staging laparoscopy saged as borderline resectable\par <when> C1 gemcitabine+nab-paclitaxel (Dr Carrasco)\par 09/07/21 C5 gemcitabine+nab-paclitaxel, courses c/b neutropenia requiring growth factor\par 09/26/21 admission for right foot big toe osteomyelitis s/p amputation\par 10/26/21 s/p resection (Whipple; [facility]), ypT3 N0, positive margin; pMMR; discharged to rehab facility\par <> completed SBRT by Dr Beto Carmona\par \par Family cancer hx:  Father- prostate ;mother ovarian?\par  [de-identified] : 03/17\par - Discharged from rehab in December. Needing help taking a shower, going to bathroom etc. Using a walker/wheelchair for long distances. \par - Needs prescription for home health aid as currently paying for this out of pocket\par - barely able to walk in the [place]\par - Wound not healing well- plan for hyperbaric oxygen chamber starting tomorrow \par - S/p IV abx for his osteomyeolitis but that is being stopped in next few days\par - daughter Kristi accompanies\par - Wants to see a psycho-oncologist \par

## 2022-03-17 NOTE — ED PROVIDER NOTE - GASTROINTESTINAL, MLM
+ large healing ventral wound with granulation tissue. no erythema no discharge Abdomen soft, non-tender, no guarding.

## 2022-03-17 NOTE — PLAN
[FreeTextEntry1] : Patient examined and evaluated at this time.\par pressure to the right and left 2nd digit.now very red and edematous with all signs of infection\par evaluated by podiatry resdient and sent to ER for further evaluation\par . Currently also has abdominal wound. \par Spent 20 minutes for patient care and medical decision making.\par Dry dressing to right 2nd toe\par Patient to follow up in 5 days\par

## 2022-03-17 NOTE — H&P ADULT - NSHPPHYSICALEXAM_GEN_ALL_CORE
T(C): 36.6 (03-17-22 @ 17:42), Max: 36.6 (03-17-22 @ 17:42)  HR: 70 (03-17-22 @ 17:42) (67 - 70)  BP: 117/65 (03-17-22 @ 17:42) (117/65 - 127/82)  RR: 18 (03-17-22 @ 17:42) (16 - 18)  SpO2: 100% (03-17-22 @ 17:42) (100% - 100%)    GENERAL: patient appears well, no acute distress, conversant  EYES: anicteric sclerae, no exudates  ENMT: oropharynx clear without erythema, no exudates, moist mucous membranes  NECK: supple, soft  LUNGS: clear to auscultation, no intercostal retractions  HEART: S1/S2, regular rate and rhythm, no murmurs noted  GASTROINTESTINAL: abdominal wound is dressed, binder in place, abdomen is soft, nontender, nondistended, no palpable masses  INTEGUMENT: RLE 2nd digit with ulceration, weeping serosanguineous fluid on erythematous base  MUSCULOSKELETAL: s/p R hallux amputation  NEUROLOGIC: awake, alert, oriented x3, good muscle tone in 4 extremities, no obvious sensory deficits  PSYCHIATRIC: mood is good, affect is congruent, linear and logical thought process  HEME/LYMPH:  no obvious ecchymosis or petechiae

## 2022-03-17 NOTE — H&P ADULT - NSHPSOCIALHISTORY_GEN_ALL_CORE
Tobacco: denies  EtOH: social  Recreational drug use: Vapes few hits of medical MJ daily  Lives with: 4 hour aide  Ambulates: walker  ADLs: assistance  Vaccinations: Pfizer x3, 2/17/22

## 2022-03-17 NOTE — H&P ADULT - ASSESSMENT
80 year old male with PMH pancreatic cancer (dx 3/2021, chemo) s/p ERCP s/p whipple 3/2021 and 10/26/21, HTN, HLD, HFrEF (Oct 2021 EF: 35%), CAD s/p stents x2 (~15 years ago), Vtach with AICD (implanted 2005, extracted and re-implantation 9/2021 and 10/4/21), TAVR (4/2021), bilateral PE (7/2021) on Xarelto, spinal stenosis, macular degeneration, GERD, BPH, Left THR (x2 revisions), left femur fracture (hardware), Osteomyelitis s/p R hallux amputation 9/2021, MSSA/VRE bacteremia admitted for infected wound of RLE 2nd digit.

## 2022-03-17 NOTE — CONSULT NOTE ADULT - SUBJECTIVE AND OBJECTIVE BOX
HPI:  81 year old Male seen in the Wound Care Center earlier today for an assessment of right 2nd digit infected ulcer. He had been following up at the Wound Care Center for the upper extremity. Yesterday, he noticed an open area on the right 2nd digit with increased redness, warmth and swelling to the area. He is s/p right 1st partial ray resection 2/2 with Dr. López (DOS: Sept 2021) and states that he similarly had an ulcer on the hallux at the time as well, which eventually led to the partial ray amputation. He ambulates with a rolling walker and uses shoes with a soft top, however they have a tight toe box causing pressure on the toes. Denies any fever, chills, nausea, vomiting, chest pain, shortness of breath, or calf pain at this time.    PAST MEDICAL & SURGICAL HISTORY:  HTN (hypertension)    HLD (hyperlipidemia)    GERD (gastroesophageal reflux disease)    Cardiomyopathy    MSSA bacteremia  9/2021    Acute osteomyelitis    Pulmonary embolism    Pancreas cancer  chemo    [place] (hard of hearing)  B/L hearing aids    History of total hip replacement  left X 1, 2 revisions    History of laminectomy  X3    ICD (implantable cardiac defibrillator) in place  implanted 2005, replaced 10/4/2021 [employer] [employer] Subcutaneous ICD    Benign testicular tumor  radiation    Status post amputation of toe of right foot  8/2021    Status post fracture of femur  2017 left with hardware    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    H/O Whipple procedure  2/2021        Allergies    Dilaudid (Anaphylaxis; Hives)    Intolerances    FAMILY HISTORY:  Family history of stroke (Mother)        Vital Signs Last 24 Hrs  T(C): 36.3 (17 Mar 2022 15:49), Max: 36.3 (17 Mar 2022 15:49)  T(F): 97.3 (17 Mar 2022 15:49), Max: 97.3 (17 Mar 2022 15:49)  HR: 67 (17 Mar 2022 15:49) (67 - 67)  BP: 127/82 (17 Mar 2022 15:49) (127/82 - 127/82)  BP(mean): --  RR: 16 (17 Mar 2022 15:49) (16 - 16)  SpO2: 100% (17 Mar 2022 15:49) (100% - 100%)    PHYSICAL EXAM:  Vascular: DP palpable b/l, PT non-palpable b/l, Capillary refill 3 seconds to remaining digits, Digital hair absent bilaterally, right foot non-pitting edema.  Neurological: Light touch sensation diminished bilaterally.  Musculoskeletal: s/p right partial ray amputation. Hammered digits b/l. AJ & STJ ROM intact.  Dermatological: 1cm x 1.2cm x 0.2cm ulceration noted to the right dorsal digit, fibro:granular wound bed, (+) probe to bone, (+) periwound erythema, no malodor, proximal streaking extending to the midfoot, no fluctuance, serosanguinous drainage, no purulence.    CBC Full  -  ( 17 Mar 2022 13:09 )  WBC Count : 5.23 K/uL  RBC Count : 2.94 M/uL  Hemoglobin : 9.0 g/dL  Hematocrit : 28.9 %  Platelet Count - Automated : 156 K/uL  Mean Cell Volume : 98.3 fl  Mean Cell Hemoglobin : 30.6 pg  Mean Cell Hemoglobin Concentration : 31.1 g/dL  Auto Neutrophil # : 3.77 K/uL  Auto Lymphocyte # : 0.73 K/uL  Auto Monocyte # : 0.73 K/uL  Auto Eosinophil # : 0.00 K/uL  Auto Basophil # : 0.00 K/uL  Auto Neutrophil % : 72.0 %  Auto Lymphocyte % : 14.0 %  Auto Monocyte % : 14.0 %  Auto Eosinophil % : 0.0 %  Auto Basophil % : 0.0 %    Chem panel pending.   HPI:  81 year old Male seen in the [facility] Care Center earlier today for an assessment of right 2nd digit infected ulcer. He had been following up at the [facility] Care Center for the upper extremity. Yesterday, he noticed an open area on the right 2nd digit with increased redness, warmth and swelling to the area. He is s/p right 1st partial ray resection 2/2 with Dr. López (DOS: Sept 2021) and states that he similarly had an ulcer on the hallux at the time as well, which eventually led to the partial ray amputation. He ambulates with a rolling walker and uses shoes with a soft top, however they have a tight toe box causing pressure on the toes. Denies any fever, chills, nausea, vomiting, chest pain, shortness of breath, or calf pain at this time.    PAST MEDICAL & SURGICAL HISTORY:  HTN (hypertension)    HLD (hyperlipidemia)    GERD (gastroesophageal reflux disease)    Cardiomyopathy    MSSA bacteremia  9/2021    Acute osteomyelitis    Pulmonary embolism    Pancreas cancer  chemo    [place] (hard of hearing)  B/L hearing aids    History of total hip replacement  left X 1, 2 revisions    History of laminectomy  X3    ICD (implantable cardiac defibrillator) in place  implanted 2005, replaced 10/4/2021 [employer] [employer] Subcutaneous ICD    Benign testicular tumor  radiation    Status post amputation of toe of right foot  8/2021    Status post fracture of femur  2017 left with hardware    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    H/O Whipple procedure  2/2021        Allergies    Dilaudid (Anaphylaxis; Hives)    Intolerances    FAMILY HISTORY:  Family history of stroke (Mother)        Vital Signs Last 24 Hrs  T(C): 36.3 (17 Mar 2022 15:49), Max: 36.3 (17 Mar 2022 15:49)  T(F): 97.3 (17 Mar 2022 15:49), Max: 97.3 (17 Mar 2022 15:49)  HR: 67 (17 Mar 2022 15:49) (67 - 67)  BP: 127/82 (17 Mar 2022 15:49) (127/82 - 127/82)  BP(mean): --  RR: 16 (17 Mar 2022 15:49) (16 - 16)  SpO2: 100% (17 Mar 2022 15:49) (100% - 100%)    PHYSICAL EXAM:  Vascular: DP palpable b/l, PT non-palpable b/l, Capillary refill 3 seconds to remaining digits, Digital hair absent bilaterally, right foot non-pitting edema.  Neurological: Light touch sensation diminished bilaterally.  Musculoskeletal: s/p right partial ray amputation. Hammered digits b/l. AJ & STJ ROM intact.  Dermatological: 1cm x 1.2cm x 0.2cm ulceration noted to the right dorsal digit, fibro:granular wound bed, (+) probe to bone, (+) periwound erythema, no malodor, proximal streaking extending to the midfoot, no fluctuance, serosanguinous drainage, no purulence.    CBC Full  -  ( 17 Mar 2022 13:09 )  WBC Count : 5.23 K/uL  RBC Count : 2.94 M/uL  Hemoglobin : 9.0 g/dL  Hematocrit : 28.9 %  Platelet Count - Automated : 156 K/uL  Mean Cell Volume : 98.3 fl  Mean Cell Hemoglobin : 30.6 pg  Mean Cell Hemoglobin Concentration : 31.1 g/dL  Auto Neutrophil # : 3.77 K/uL  Auto Lymphocyte # : 0.73 K/uL  Auto Monocyte # : 0.73 K/uL  Auto Eosinophil # : 0.00 K/uL  Auto Basophil # : 0.00 K/uL  Auto Neutrophil % : 72.0 %  Auto Lymphocyte % : 14.0 %  Auto Monocyte % : 14.0 %  Auto Eosinophil % : 0.0 %  Auto Basophil % : 0.0 %    Chem panel pending.   HPI:  81 year old Male seen in the [facility] Care Center earlier today for an assessment of right 2nd digit infected ulcer. He normally follows up at the Wound Care Center for an abdominal wall wound, and was due to start hyperbaric oxygen therapy tomorrow. Yesterday, he noticed an open area on the right 2nd digit with increased redness, warmth and swelling to the area. He is s/p right 1st partial ray resection 2/2 with Dr. López (DOS: Sept 2021) and states that he similarly had an ulcer on the hallux at the time as well, which eventually led to the partial ray amputation. He ambulates with a rolling walker and uses shoes with a soft top, however they have a tight toe box causing pressure on the toes. Denies any fever, chills, nausea, vomiting, chest pain, shortness of breath, or calf pain at this time.    PAST MEDICAL & SURGICAL HISTORY:  HTN (hypertension)    HLD (hyperlipidemia)    GERD (gastroesophageal reflux disease)    Cardiomyopathy    MSSA bacteremia  9/2021    Acute osteomyelitis    Pulmonary embolism    Pancreas cancer  chemo    [place] (hard of hearing)  B/L hearing aids    History of total hip replacement  left X 1, 2 revisions    History of laminectomy  X3    ICD (implantable cardiac defibrillator) in place  implanted 2005, replaced 10/4/2021 [place] [employer] Subcutaneous ICD    Benign testicular tumor  radiation    Status post amputation of toe of right foot  8/2021    Status post fracture of femur  2017 left with hardware    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    H/O Whipple procedure  2/2021        Allergies    Dilaudid (Anaphylaxis; Hives)    Intolerances    FAMILY HISTORY:  Family history of stroke (Mother)        Vital Signs Last 24 Hrs  T(C): 36.3 (17 Mar 2022 15:49), Max: 36.3 (17 Mar 2022 15:49)  T(F): 97.3 (17 Mar 2022 15:49), Max: 97.3 (17 Mar 2022 15:49)  HR: 67 (17 Mar 2022 15:49) (67 - 67)  BP: 127/82 (17 Mar 2022 15:49) (127/82 - 127/82)  BP(mean): --  RR: 16 (17 Mar 2022 15:49) (16 - 16)  SpO2: 100% (17 Mar 2022 15:49) (100% - 100%)    PHYSICAL EXAM:  Vascular: DP palpable b/l, PT non-palpable b/l, Capillary refill 3 seconds to remaining digits, Digital hair absent bilaterally, right foot non-pitting edema.  Neurological: Light touch sensation diminished bilaterally.  Musculoskeletal: s/p right partial ray amputation. Hammered digits b/l. AJ & STJ ROM intact.  Dermatological: 1cm x 1.2cm x 0.2cm ulceration noted to the right dorsal digit, fibro:granular wound bed, (+) probe to bone, (+) periwound erythema, no malodor, proximal streaking extending to the midfoot, no fluctuance, serosanguinous drainage, no purulence.    CBC Full  -  ( 17 Mar 2022 13:09 )  WBC Count : 5.23 K/uL  RBC Count : 2.94 M/uL  Hemoglobin : 9.0 g/dL  Hematocrit : 28.9 %  Platelet Count - Automated : 156 K/uL  Mean Cell Volume : 98.3 fl  Mean Cell Hemoglobin : 30.6 pg  Mean Cell Hemoglobin Concentration : 31.1 g/dL  Auto Neutrophil # : 3.77 K/uL  Auto Lymphocyte # : 0.73 K/uL  Auto Monocyte # : 0.73 K/uL  Auto Eosinophil # : 0.00 K/uL  Auto Basophil # : 0.00 K/uL  Auto Neutrophil % : 72.0 %  Auto Lymphocyte % : 14.0 %  Auto Monocyte % : 14.0 %  Auto Eosinophil % : 0.0 %  Auto Basophil % : 0.0 %    Chem panel pending.   HPI:  81 year old Male seen in the [facility] Care Center earlier today for an assessment of right 2nd digit infected ulcer. He normally follows up at the Wound Care Center for an abdominal wall wound, and was due to start hyperbaric oxygen therapy tomorrow. Yesterday, he noticed an open area on the right 2nd digit with increased redness, warmth and swelling to the area. He is s/p right 1st partial ray resection 2/2 with Dr. López (DOS: Sept 2021) and states that he similarly had an ulcer on the hallux at the time as well, which eventually led to the partial ray amputation. He ambulates with a rolling walker and uses shoes with a soft top, however they have a tight toe box causing pressure on the toes. Denies any fever, chills, nausea, vomiting, chest pain, shortness of breath, or calf pain at this time.    PAST MEDICAL & SURGICAL HISTORY:  HTN (hypertension)    HLD (hyperlipidemia)    GERD (gastroesophageal reflux disease)    Cardiomyopathy    MSSA bacteremia  9/2021    Acute osteomyelitis    Pulmonary embolism    Pancreas cancer  chemo    [place] (hard of hearing)  B/L hearing aids    History of total hip replacement  left X 1, 2 revisions    History of laminectomy  X3    ICD (implantable cardiac defibrillator) in place  implanted 2005, replaced 10/4/2021 [place] [employer] Subcutaneous ICD    Benign testicular tumor  radiation    Status post amputation of toe of right foot  8/2021    Status post fracture of femur  2017 left with hardware    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    H/O Whipple procedure  2/2021        Allergies    Dilaudid (Anaphylaxis; Hives)    Intolerances    FAMILY HISTORY:  Family history of stroke (Mother)        Vital Signs Last 24 Hrs  T(C): 36.3 (17 Mar 2022 15:49), Max: 36.3 (17 Mar 2022 15:49)  T(F): 97.3 (17 Mar 2022 15:49), Max: 97.3 (17 Mar 2022 15:49)  HR: 67 (17 Mar 2022 15:49) (67 - 67)  BP: 127/82 (17 Mar 2022 15:49) (127/82 - 127/82)  BP(mean): --  RR: 16 (17 Mar 2022 15:49) (16 - 16)  SpO2: 100% (17 Mar 2022 15:49) (100% - 100%)    PHYSICAL EXAM:  Vascular: DP palpable b/l, PT non-palpable b/l, Capillary refill 3 seconds to remaining digits, Digital hair absent bilaterally, right foot non-pitting edema.  Neurological: Light touch sensation diminished bilaterally.  Musculoskeletal: s/p right partial ray amputation. Hammered digits b/l. AJ & STJ ROM intact.  Dermatological: 1cm x 1.2cm x 0.2cm ulceration noted to the right 2nd digit dorsal PIPJ, fibro:granular wound bed, (+) probe to bone, (+) periwound erythema, no malodor, proximal streaking extending to the midfoot, no fluctuance, serosanguinous drainage, no purulence. Left 2nd digit dorsal PIPJ epithelialized lesion.    CBC Full  -  ( 17 Mar 2022 13:09 )  WBC Count : 5.23 K/uL  RBC Count : 2.94 M/uL  Hemoglobin : 9.0 g/dL  Hematocrit : 28.9 %  Platelet Count - Automated : 156 K/uL  Mean Cell Volume : 98.3 fl  Mean Cell Hemoglobin : 30.6 pg  Mean Cell Hemoglobin Concentration : 31.1 g/dL  Auto Neutrophil # : 3.77 K/uL  Auto Lymphocyte # : 0.73 K/uL  Auto Monocyte # : 0.73 K/uL  Auto Eosinophil # : 0.00 K/uL  Auto Basophil # : 0.00 K/uL  Auto Neutrophil % : 72.0 %  Auto Lymphocyte % : 14.0 %  Auto Monocyte % : 14.0 %  Auto Eosinophil % : 0.0 %  Auto Basophil % : 0.0 %    Chem panel pending.

## 2022-03-17 NOTE — ED ADULT NURSE NOTE - CHIEF COMPLAINT QUOTE
Patient arrives a&o c/o right foot infection. Patient was directed to ER by resident Dr. Villafana. Patient reports swelling noted to right foot as well.

## 2022-03-17 NOTE — ED PROVIDER NOTE - NS ED ATTENDING STATEMENT MOD
This was a shared visit with the PACO. I reviewed and verified the documentation and independently performed the documented:

## 2022-03-17 NOTE — ASSESSMENT
[Verbal] : Verbal [Written] : Written [Patient] : Patient [Family member] : Family member [Good - alert, interested, motivated] : Good - alert, interested, motivated [Verbalizes knowledge/Understanding] : Verbalizes knowledge/understanding [Dressing changes] : dressing changes [Foot Care] : foot care [Skin Care] : skin care [Signs and symptoms of infection] : sign and symptoms of infection [How and When to Call] : how and when to call [Nutrition] : nutrition [Labs and Tests] : labs and tests [Hyperbaric Therapy] : hyperbaric therapy [Pain Management] : pain management [Hospitalization] : hospitalization [Patient responsibility to plan of care] : patient responsibility to plan of care [Glycemic Control] : glycemic control [Stable] : stable [Hospital] : Hospital [Wheelchair] : Wheelchair [Not Applicable - Long Term Care/Home Health Agency] : Long Term Care/Home Health Agency: Not Applicable [] : No [FreeTextEntry2] : Infection prevention\par Localized wound care \par Goal remaining pain free regarding wounds\par hyperbaric oxygen therapy  [FreeTextEntry3] : Patient presents with new wound.  [FreeTextEntry4] : Pt presents with new open wound on right foot 2nd digit. wound looked at by podiatrist resident Dr. Myrick as well. Pt transferred to [place] ED for toe infection. \par Pt supposed to start HBO tomorrow\par Culture swab obtained and sent to lab \par Follow up when pt discharged from [facility] and to start HBOT.

## 2022-03-17 NOTE — REVIEW OF SYSTEMS
[Skin Wound] : skin wound [Fever] : no fever [Chills] : no chills [Eye Pain] : no eye pain [Shortness Of Breath] : no shortness of breath [Abdominal Pain] : no abdominal pain [Vomiting] : no vomiting [FreeTextEntry5] : cardiac defibrillator, htn, hld [FreeTextEntry3] : glaucoma, macular degeneration [FreeTextEntry9] : s/p right hallux and 1st metatarsal head amputation, bilateral hammertoes [de-identified] : right hallux and 1st metatarsal head amputation site healed, bilateral 2nd dorsal PIPJ ulcer down to skin, no erythema, no purulence, no malodor, no proximal streaking [de-identified] : lumbar radiculopathy, sciatica, spinal stenosis [de-identified] : pancreatic cancer

## 2022-03-18 NOTE — CONSULT NOTE ADULT - SUBJECTIVE AND OBJECTIVE BOX
GENERAL SURGERY CONSULT NOTE    Patient is a 81y old  Male who presents with a chief complaint of Diabetic foot ulcer (17 Mar 2022 19:22)      HPI:  80 year old male with PMH pancreatic cancer (dx 3/2021, chemo) s/p ERCP s/p whipple 3/2021 and 10/26/21, HTN, HLD, HFrEF (Oct 2021 EF: 35%), CAD s/p stents x2 (~15 years ago), Vtach with AICD (implanted , extracted and re-implantation 2021 and 10/4/21), TAVR (2021), bilateral PE (2021) on Xarelto, spinal stenosis, macular degeneration, GERD, BPH, Left THR (x2 revisions), left femur fracture (hardware), Osteomyelitis s/p R hallux amputation 2021, MSSA/VRE bacteremia presenting with wound of RLE 2nd digit. Patient follows with wound care for open wound of abdominal wall (present since Whipple in Oct 2021) and was scheduled for hyperbaric O2 treatment tomorrow when he noticed wound. Patient went to wound care center today for evaluation, who recommended he come to ER. At baseline, he does not have sensation in b/l LE. Denies any inciting trauma to digit. Patient denies any fevers, chills, purulent discharge at home.    CURRENTLY:  Pt currently with complaints of left foot pain and bilateral knee pain.  States he otherwise feels well.  Recent SUZANNE (2022, showing No Doppler evidence of hemodynamically significant arterial inflow limitation in either lower extremity.(Full report below).        ED Course:   97.3, 127/82, 67, 16, 100% RA   WBC wnl, Hb 9.9, ESR 56  Given: vanc, zosyn, 1L NS bolus (17 Mar 2022 19:22)      PAST MEDICAL & SURGICAL HISTORY:  HTN (hypertension)    HLD (hyperlipidemia)    GERD (gastroesophageal reflux disease)    Cardiomyopathy    MSSA bacteremia  2021    Acute osteomyelitis    Pulmonary embolism    Pancreas cancer  chemo, s/p Whipple    [place] (hard of hearing)  B/L hearing aids    History of total hip replacement  left X 1, 2 revisions    History of laminectomy  X3    ICD (implantable cardiac defibrillator) in place  implanted , replaced 10/4/2021 [employer] Subcutaneous ICD    Benign testicular tumor  radiation    Status post amputation of toe of right foot  2021    Status post fracture of femur  2017 left with hardware    S/P TAVR (transcatheter aortic valve replacement)  2021    H/O Whipple procedure  2021, 10/2021          MEDICATIONS  (STANDING):  aMIOdarone    Tablet 200 milliGRAM(s) Oral daily  brimonidine 0.2% Ophthalmic Solution 1 Drop(s) Both EYES two times a day  cefepime   IVPB 2000 milliGRAM(s) IV Intermittent every 8 hours  dextrose 40% Gel 15 Gram(s) Oral once  dextrose 5%. 1000 milliLiter(s) (50 mL/Hr) IV Continuous <Continuous>  dextrose 5%. 1000 milliLiter(s) (100 mL/Hr) IV Continuous <Continuous>  dextrose 50% Injectable 25 Gram(s) IV Push once  dextrose 50% Injectable 12.5 Gram(s) IV Push once  dextrose 50% Injectable 25 Gram(s) IV Push once  DULoxetine 60 milliGRAM(s) Oral daily  furosemide    Tablet 20 milliGRAM(s) Oral daily  glucagon  Injectable 1 milliGRAM(s) IntraMuscular once  insulin lispro (ADMELOG) corrective regimen sliding scale   SubCutaneous three times a day before meals  insulin lispro (ADMELOG) corrective regimen sliding scale   SubCutaneous at bedtime  latanoprost 0.005% Ophthalmic Solution 1 Drop(s) Both EYES at bedtime  metoprolol succinate ER 50 milliGRAM(s) Oral daily  pancrelipase  (CREON 36,000 Lipase Units) 3 Capsule(s) Oral three times a day with meals  pantoprazole    Tablet 40 milliGRAM(s) Oral before breakfast  rivaroxaban 20 milliGRAM(s) Oral with dinner  tamsulosin 0.4 milliGRAM(s) Oral at bedtime  timolol 0.5% Solution 1 Drop(s) Both EYES every 12 hours  vancomycin  IVPB 1250 milliGRAM(s) IV Intermittent every 12 hours    MEDICATIONS  (PRN):  acetaminophen     Tablet .. 650 milliGRAM(s) Oral every 6 hours PRN Temp greater or equal to 38C (100.4F), Mild Pain (1 - 3)  ALPRAZolam 0.25 milliGRAM(s) Oral at bedtime PRN Insomnia      Allergies    Dilaudid (Anaphylaxis; Hives)    Intolerances        SOCIAL HISTORY          Smoking: Yes [ ]  No X[ ]   ______pk yrs          ETOH  Yes [ ]  No [X ]  Social [ ]          DRUGS:  Yes [X ]  No [ ]  - Daily medical marijuana user for chronic pain.      FAMILY HISTORY:  Family history of stroke (Mother)        Vital Signs Last 24 Hrs  T(C): 36.6 (18 Mar 2022 04:59), Max: 36.6 (17 Mar 2022 17:42)  T(F): 97.8 (18 Mar 2022 04:59), Max: 97.9 (17 Mar 2022 17:42)  HR: 73 (18 Mar 2022 04:59) (67 - 79)  BP: 101/60 (18 Mar 2022 04:59) (101/60 - 127/82)  BP(mean): --  RR: 17 (18 Mar 2022 04:59) (16 - 18)  SpO2: 94% (18 Mar 2022 04:59) (94% - 100%)        LABS:                        9.9    5.01  )-----------( 174      ( 17 Mar 2022 17:54 )             30.6     03    137  |  102  |  14  ----------------------------<  110<H>  4.2   |  28  |  0.80    Ca    8.9      17 Mar 2022 17:54    TPro  7.9  /  Alb  3.6  /  TBili  0.7  /  DBili  x   /  AST  25  /  ALT  23  /  AlkPhos  121<H>  03-17    PT/INR - ( 17 Mar 2022 17:54 )   PT: 21.7 sec;   INR: 1.84 ratio         PTT - ( 17 Mar 2022 17:54 )  PTT:37.0 sec  Urinalysis Basic - ( 17 Mar 2022 22:21 )    Color: Pale Yellow / Appearance: Clear / S.010 / pH: x  Gluc: x / Ketone: Negative  / Bili: Negative / Urobili: Negative   Blood: x / Protein: Negative / Nitrite: Negative   Leuk Esterase: Negative / RBC: x / WBC x   Sq Epi: x / Non Sq Epi: x / Bacteria: x        RADIOLOGY & ADDITIONAL STUDIES:  < from: [facility] Physiol Extremity Lower 3+ Level, BI (22 @ 15:03) >  ACC: [id number] EXAM:  PHYSIOL EXTREM LOW 3+ LEV BI                          PROCEDURE DATE:  2022          INTERPRETATION:  Clinical information: Nonsmoker, nondiabetic,   hypertension, prior amputation right first digit, presents with   nonhealing ulcer right foot.    COMPARISON: None available.    TECHNIQUE: Lower extremity arterial Doppler study.    FINDINGS: Ankle brachial index measures 1.12 on the right and 0.96 on the   left. No segmental arterial pressure gradient is detected. Toe brachial   index measures 0.66 on the left. It is unobtainable on the right due to   amputation.    PVR waveforms are normal in amplitude and pulsatility through the   metatarsal levels. Left digital waveform is mildly diminished in   amplitude.    IMPRESSION: No Doppler evidence of hemodynamically significant arterial   inflow limitation in either lower extremity.    --- End of Report ---            BRANDEE CONWAY MD; Attending Radiologist  This document has been electronically signed. 2022  9:55AM    < end of copied text >      Risks, benefits, and alternatives to treatment discussed. All questions answered with understanding.

## 2022-03-18 NOTE — CONSULT NOTE ADULT - SUBJECTIVE AND OBJECTIVE BOX
HPI:  80 year old male with PMH pancreatic cancer (dx 3/2021, chemo) s/p ERCP s/p whipple 3/2021 and 10/26/21, HTN, HLD, HFrEF (Oct 2021 EF: 35%), CAD s/p stents x2 (~15 years ago), Vtach with AICD (implanted 2005, extracted and re-implantation 9/2021 and 10/4/21), TAVR (4/2021), bilateral PE (7/2021) on Xarelto, spinal stenosis, macular degeneration, GERD, BPH, Left THR (x2 revisions), left femur fracture (hardware), Osteomyelitis s/p R hallux amputation 9/2021, MSSA/VRE bacteremia presenting with wound of RLE 2nd digit. Patient follows with wound care for open wound of abdominal wall (present since Whipple in Oct 2021) and was scheduled for hyperbaric O2 treatment tomorrow when he noticed wound. Patient went to wound care center today for evaluation, who recommended he come to ER. At baseline, he does not have sensation in b/l LE. Denies any inciting trauma to digit. Patient denies any fevers, chills, purulent discharge at home.      PAST MEDICAL & SURGICAL HISTORY:  HTN (hypertension)    HLD (hyperlipidemia)    GERD (gastroesophageal reflux disease)    Cardiomyopathy    MSSA bacteremia  9/2021    Acute osteomyelitis    Pulmonary embolism    Pancreas cancer  chemo, s/p Whipple    [place] (hard of hearing)  B/L hearing aids    History of total hip replacement  left X 1, 2 revisions    History of laminectomy  X3    ICD (implantable cardiac defibrillator) in place  implanted 2005, replaced 10/4/2021 [employer] Subcutaneous ICD    Benign testicular tumor  radiation    Status post amputation of toe of right foot  8/2021    Status post fracture of femur  2017 left with hardware    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    H/O Whipple procedure  2/2021, 10/2021      REVIEW OF SYSTEMS  CONSTITUTIONAL: +weakness, no fevers or chills  HEENT: denies blurred visions, sore throat  SKIN: denies new lesions, rash  CARDIOVASCULAR: Denies chest pain, palpitations  RESPIRATORY: denies shortness of breath, sputum production  GASTROINTESTINAL: denies nausea, vomiting, diarrhea, abdominal pain  all other ROS is negative unless indicated above      MEDICATIONS  (STANDING):  aluminum hydroxide/magnesium hydroxide/simethicone Suspension 30 milliLiter(s) Oral every 6 hours  aMIOdarone    Tablet 200 milliGRAM(s) Oral daily  brimonidine 0.2% Ophthalmic Solution 1 Drop(s) Both EYES two times a day  dextrose 40% Gel 15 Gram(s) Oral once  dextrose 5%. 1000 milliLiter(s) (50 mL/Hr) IV Continuous <Continuous>  dextrose 5%. 1000 milliLiter(s) (100 mL/Hr) IV Continuous <Continuous>  dextrose 50% Injectable 25 Gram(s) IV Push once  dextrose 50% Injectable 12.5 Gram(s) IV Push once  dextrose 50% Injectable 25 Gram(s) IV Push once  DULoxetine 60 milliGRAM(s) Oral daily  furosemide    Tablet 20 milliGRAM(s) Oral daily  glucagon  Injectable 1 milliGRAM(s) IntraMuscular once  insulin lispro (ADMELOG) corrective regimen sliding scale   SubCutaneous three times a day before meals  insulin lispro (ADMELOG) corrective regimen sliding scale   SubCutaneous at bedtime  latanoprost 0.005% Ophthalmic Solution 1 Drop(s) Both EYES at bedtime  metoprolol succinate ER 50 milliGRAM(s) Oral daily  pancrelipase  (CREON 36,000 Lipase Units) 3 Capsule(s) Oral three times a day with meals  pantoprazole    Tablet 40 milliGRAM(s) Oral before breakfast  piperacillin/tazobactam IVPB.. 3.375 Gram(s) IV Intermittent every 8 hours  rivaroxaban 20 milliGRAM(s) Oral with dinner  tamsulosin 0.4 milliGRAM(s) Oral at bedtime  timolol 0.5% Solution 1 Drop(s) Both EYES every 12 hours    MEDICATIONS  (PRN):  acetaminophen     Tablet .. 650 milliGRAM(s) Oral every 6 hours PRN Temp greater or equal to 38C (100.4F), Mild Pain (1 - 3)  ALPRAZolam 0.25 milliGRAM(s) Oral at bedtime PRN Insomnia      Allergies    Dilaudid (Anaphylaxis; Hives)    Intolerances    SOCIAL HISTORY:      FAMILY HISTORY:  Family history of stroke (Mother)    Vital Signs Last 24 Hrs  T(C): 36.4 (18 Mar 2022 11:56), Max: 36.6 (17 Mar 2022 17:42)  T(F): 97.6 (18 Mar 2022 11:56), Max: 97.9 (17 Mar 2022 17:42)  HR: 80 (18 Mar 2022 11:56) (67 - 80)  BP: 97/61 (18 Mar 2022 11:56) (97/61 - 127/82)  BP(mean): --  RR: 17 (18 Mar 2022 11:56) (16 - 18)  SpO2: 96% (18 Mar 2022 11:56) (94% - 100%)                          8.3    3.01  )-----------( 135      ( 18 Mar 2022 08:54 )             25.9     03-18    140  |  108  |  13  ----------------------------<  129<H>  4.1   |  27  |  0.73    Ca    8.6      18 Mar 2022 08:54    TPro  7.9  /  Alb  3.6  /  TBili  0.7  /  DBili  x   /  AST  25  /  ALT  23  /  AlkPhos  121<H>  03-17    Auto Neutrophil #: 3.65 K/uL (03-17-22 @ 17:54)  Sedimentation Rate, Erythrocyte: 56 mm/hr (03-17-22 @ 17:54)    A1C with Estimated Average Glucose Result: 5.8 % (03-18-22 @ 11:28)    Respiratory: CTA    Gastrointestinal soft NT/ND (+)BS    Neurology  weakened strength & sensation grossly intact/ paraesthesia  verbal, Can follow commands    Musculoskeletal/Vascular:  ROM intact  no deformities/ contractures    Skin:  moist w/ good turgor  moderate serosanguinous drainage, erythema, hypergranulation  No odor, warmth, tenderness, induration, fluctuance

## 2022-03-18 NOTE — CONSULT NOTE ADULT - ASSESSMENT
81 yo male with extensive PMHX here for right second toe infection.  Currently toe has bandage in place.  Previous SUZANNE, No Doppler evidence of hemodynamically significant arterial inflow limitation in either lower extremity.  Currently on ABX, and continued on Xarelto

## 2022-03-18 NOTE — PATIENT PROFILE ADULT - NSPROIMPLANTSMEDDEV_GEN_A_NUR
Defibrillator Defibrillator, 15 year old stents, left femur fracture hardware/Automatic Implantable Cardioverter Defibrillator/Hearing aid

## 2022-03-18 NOTE — PROGRESS NOTE ADULT - ASSESSMENT
R 2nd digit grade 3 ulcer    Current plan is to cont IV abx over the weekend. Pt will likely need right 2nd digit amputation next week- pending bone scan results.  - Requesting medical risk stratification and optimization.  - Requesting cardiology risk stratification and optimization.

## 2022-03-18 NOTE — CONSULT NOTE ADULT - PROBLEM SELECTOR RECOMMENDATION 9
Awaiting hemoglobin A1c  If has type 2 diabetes, change diet, may need medication, endo consult  SUZANNE performed  Antibiotics per ID  Awaiting AM labs  Possible diagnostic angio as this is the second toe that he has had an issue with  Care per primary team  Wound care per podiatry  Will discuss with Dr. Liu. Awaiting hemoglobin A1c  If has type 2 diabetes, change diet, may need medication, endo consult  SUZANNE performed  Antibiotics per ID  Awaiting AM labs  No surgical intervention required at this time.    Care per primary team  Wound care per podiatry  Will discuss with Dr. Liu.

## 2022-03-18 NOTE — PROGRESS NOTE ADULT - PROBLEM SELECTOR PLAN 1
Pt seen and evaluated.  WBC 3.01, ESR 56, CRP 15, HA1c pending.  Right foot XR results reviewed- There is mild hammertoe deformity but likely of the second toe. No active bone destruction or fracture evident. Findings similar to October 28, 2021.  Obtained right foot wound culture in the Wound Care Center- f/u results.  Cleansed area and applied DSD to the right foot.  ~  Bone scan ordered to r/o osteomyelitis as pt cannot have an MRI (pacemaker and hardware in the body)- will be done Mon 3/21/22.  ~  Per ID- Zosyn for now based on cultures from previous admission.  Pt can be weight bearing as tolerated in a surgical shoe and with a rolling walker.  Current plan is to cont IV abx over the weekend. Pt will likely need right 2nd digit amputation next week- pending bone scan results.  Requesting medical risk stratification and optimization.  Requesting cardiology risk stratification and optimization.  Pt is high risk for sepsis, limb loss and death.  Pt will be followed by Podiatry while in house.

## 2022-03-18 NOTE — CONSULT NOTE ADULT - SKIN COMMENTS
Right foot- + 1x1 cm ulcer to anterior aspect of second digit.  Mild oozing with central erythema.  No foul smell.  Toe is edematous and erythematous.  Foot is warm to touch..  Noted right hallux previous amputation scar.   well healed.

## 2022-03-18 NOTE — CONSULT NOTE ADULT - ASSESSMENT
Patient presents with:   1. Chronic open abdominal wound 12 x 7 moderate serosanguinous drainage, erythema, hypergranulation  No odor, warmth, tenderness, induration, fluctuance  At risk for altered tissue perfusion NO/  Impaired perfusion of peripheral tissue NO/  Patient is on a low air loss bed for pressure injury prevention  Continue  Nutrition (as tolerated)  Continue  Offloading   Continue Pericare  Apply cair boots at all times while in bed.   Provide skin checks and foot placement q8h.  Care as per medicine will follow w/ you  Follow up as outpatient at Wound Center   Findings and recommendations discussed with DAT Benites   Thank you for this consult  Maribell Colon NP, [facility] [phone] Patient presents with:   1. Chronic open abdominal wound 12 x 7 moderate serosanguinous drainage, erythema, hypergranulation  No odor, warmth, tenderness, induration, fluctuance  Recommendation:   -Marlee every other day  At risk for altered tissue perfusion NO/  Impaired perfusion of peripheral tissue NO/  Patient is on a low air loss bed for pressure injury prevention  Continue  Nutrition (as tolerated)  Continue  Offloading   Continue Pericare  Apply cair boots at all times while in bed.   Provide skin checks and foot placement q8h.  Care as per medicine will follow w/ you  Follow up as outpatient at Wound Center   Findings and recommendations discussed with DAT Benites   Thank you for this consult  Maribell Colon NP, [facility] [phone]

## 2022-03-18 NOTE — PROGRESS NOTE ADULT - ASSESSMENT
80 year old male with PMH pancreatic cancer (dx 3/2021, chemo) s/p ERCP s/p whipple 3/2021 and 10/26/21, HTN, HLD, HFrEF (Oct 2021 EF: 35%), CAD s/p stents x2 (~15 years ago), Vtach with AICD (implanted 2005, extracted and re-implantation 9/2021 and 10/4/21), TAVR (4/2021), bilateral PE (7/2021) on Xarelto, spinal stenosis, macular degeneration, GERD, BPH, Left THR (x2 revisions), left femur fracture (hardware), Osteomyelitis s/p R hallux amputation 9/2021, MSSA/VRE bacteremia admitted for infected wound of RLE 2nd digit.      Problem/Plan - 1:  ·  Problem: Diabetic toe ulcer.   ·  Plan: Sent by wound care for ulceration of RLE 2nd digit  Sep 2021 episode of osteomyelitis of R hallux s/p amputation   Afebrile, no leukocytosis, Lactate wnl, elevated ESR  Care d/w ID, pt now off vanco and cefepime, changed to zosyn  Tylenol PRN for fevers  f/u blood cultures  Check bone scan - patient cannot have MRI due to AICD   RLE xray shows no bone destruction or fracture, s/p amputation of distal 1st metatarsal  WBAT  Surgical intervention pending results of above  Podiatry consulted, f/u recs  ID Dr Bell consulted, appreciate recs  Vascular consulted, appreciate recs - no indication for surgical intervention at this time  Pain control with tylenol for mild pain, tramadol added for moderate pain     Problem/Plan - 2:  ·  Problem: Chronic HFrEF (heart failure with reduced ejection fraction).   ·  Plan: Oct 2021 EF 35% with global LV dysfunction  Continue home lasix   Continue home metoprolol  CXR with some atelectasis, no PNA, consolidation, or effusions  Patient has AICD - not MRI compatible  apprec cardio consult and poss optimization.     Problem/Plan - 3:  ·  Problem: Pulmonary embolism.   ·  Plan: July 2021 b/l PEs  Continue home xarelto.     Problem/Plan - 4:  ·  Problem: T2DM (type 2 diabetes mellitus).   ·  Plan: Check HbA1c   Start insulin corrective scale while inpatient  Routine glycemic monitoring.     Problem/Plan - 5:  ·  Problem: Ventricular tachyarrhythmia.   ·  Plan: Hx of Vtach during previous admission at [facility] for Whipple  Has AICD in place (reimplanted Oct 2021 due to bacteremia)  Continue home amiodarone.     Problem/Plan - 6:  ·  Problem: Pancreatic cancer.   ·  Plan: s/p Whipple x2, most recent Oct 2021  Continue home pancrelipase  Abdominal wound in midline, receives hyperbaric O2 therapy  Wound care consulted, appreciate recs  Some abdominal bloating - maalox started     Problem/Plan - 7:  ·  Problem: Macular degeneration.   ·  Plan: Continue home regimen: brimonidine, timolol, latonoprost.     Problem/Plan - 8:  ·  Problem: BPH (benign prostatic hyperplasia).   ·  Plan: Continue home flomax.     Problem/Plan - 9:  ·  Problem: GERD (gastroesophageal reflux disease).   ·  Plan: Continue home protonix.     Problem/Plan - 10:  ·  Problem: Need for prophylactic measure.   ·  Plan; DVT ppx: xarelto.   80 year old male with PMH pancreatic cancer (dx 3/2021, chemo) s/p ERCP s/p whipple 3/2021 and 10/26/21, HTN, HLD, HFrEF (Oct 2021 EF: 35%), CAD s/p stents x2 (~15 years ago), Vtach with AICD (implanted 2005, extracted and re-implantation 9/2021 and 10/4/21), TAVR (4/2021), bilateral PE (7/2021) on Xarelto, spinal stenosis, macular degeneration, GERD, BPH, Left THR (x2 revisions), left femur fracture (hardware), Osteomyelitis s/p R hallux amputation 9/2021, MSSA/VRE bacteremia admitted for infected wound of RLE 2nd digit.      Problem/Plan - 1:  ·  Problem: Diabetic toe ulcer.   ·  Plan: Sent by wound care for ulceration of RLE 2nd digit  Sep 2021 episode of osteomyelitis of R hallux s/p amputation   Afebrile, no leukocytosis, Lactate wnl, elevated ESR  Care d/w ID, pt now off vanco and cefepime, changed to zosyn  Tylenol PRN for fevers  f/u blood cultures  Check bone scan - patient cannot have MRI due to AICD   RLE xray shows no bone destruction or fracture, s/p amputation of distal 1st metatarsal  WBAT  Surgical intervention pending results of above  Podiatry consulted, f/u recs  ID Dr Bell consulted, appreciate recs; as per d/w ID, wound care consult called, and recs reviewed  Vascular consulted, appreciate recs - no indication for surgical intervention at this time  Pain control with tylenol for mild pain, tramadol added for moderate pain     Problem/Plan - 2:  ·  Problem: Chronic HFrEF (heart failure with reduced ejection fraction).   ·  Plan: Oct 2021 EF 35% with global LV dysfunction  Continue home lasix   Continue home metoprolol  CXR with some atelectasis, no PNA, consolidation, or effusions  Patient has AICD - not MRI compatible  apprec cardio consult and poss optimization.     Problem/Plan - 3:  ·  Problem: Pulmonary embolism.   ·  Plan: July 2021 b/l PEs  Continue home xarelto.     Problem/Plan - 4:  ·  Problem: T2DM (type 2 diabetes mellitus).   ·  Plan: Check HbA1c   Start insulin corrective scale while inpatient  Routine glycemic monitoring.     Problem/Plan - 5:  ·  Problem: Ventricular tachyarrhythmia.   ·  Plan: Hx of Vtach during previous admission at [facility] for Whipple  Has AICD in place (reimplanted Oct 2021 due to bacteremia)  Continue home amiodarone.     Problem/Plan - 6:  ·  Problem: Pancreatic cancer.   ·  Plan: s/p Whipple x2, most recent Oct 2021  Continue home pancrelipase  Abdominal wound in midline, receives hyperbaric O2 therapy  Wound care consulted, appreciate recs  Some abdominal bloating - maalox started     Problem/Plan - 7:  ·  Problem: Macular degeneration.   ·  Plan: Continue home regimen: brimonidine, timolol, latonoprost.     Problem/Plan - 8:  ·  Problem: BPH (benign prostatic hyperplasia).   ·  Plan: Continue home flomax.     Problem/Plan - 9:  ·  Problem: GERD (gastroesophageal reflux disease).   ·  Plan: Continue home protonix.     Problem/Plan - 10:  ·  Problem: Need for prophylactic measure.   ·  Plan; DVT ppx: xarelto.

## 2022-03-18 NOTE — PROGRESS NOTE ADULT - SUBJECTIVE AND OBJECTIVE BOX
HPI:  81 year old Male with PMHx of pancreatic cancer (dx 3/2021, chemo) s/p ERCP s/p whipple 3/2021 and 10/26/21, HTN, HLD, HFrEF (Oct 2021 EF: 35%), CAD s/p stents x2 (~15 years ago), Vtach with AICD (implanted 2005, extracted and re-implantation 9/2021 and 10/4/21), TAVR (4/2021), bilateral PE (7/2021) on Xarelto, spinal stenosis, macular degeneration, GERD, BPH, Left THR (x2 revisions), left femur fracture (hardware), osteomyelitis s/p R hallux amputation 9/2021, MSSA/VRE bacteremia seen bedside today for infected right 2nd digit ulcer. Denies any pain to the area and does not have any other pedal complaints. Denies any fever, chills, nausea, vomiting, chest pain, shortness of breath, or calf pain at this time.    PAST MEDICAL & SURGICAL HISTORY:  HTN (hypertension)    HLD (hyperlipidemia)    GERD (gastroesophageal reflux disease)    Cardiomyopathy    MSSA bacteremia  9/2021    Acute osteomyelitis    Pulmonary embolism    Pancreas cancer  chemo    [place] (hard of hearing)  B/L hearing aids    History of total hip replacement  left X 1, 2 revisions    History of laminectomy  X3    ICD (implantable cardiac defibrillator) in place  implanted 2005, replaced 10/4/2021 [employer] Subcutaneous ICD    Benign testicular tumor  radiation    Status post amputation of toe of right foot  8/2021    Status post fracture of femur  2017 left with hardware    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    H/O Whipple procedure  2/2021        Allergies    Dilaudid (Anaphylaxis; Hives)    MEDICATIONS  (STANDING):  aMIOdarone    Tablet 200 milliGRAM(s) Oral daily  brimonidine 0.2% Ophthalmic Solution 1 Drop(s) Both EYES two times a day  dextrose 40% Gel 15 Gram(s) Oral once  dextrose 5%. 1000 milliLiter(s) (50 mL/Hr) IV Continuous <Continuous>  dextrose 5%. 1000 milliLiter(s) (100 mL/Hr) IV Continuous <Continuous>  dextrose 50% Injectable 25 Gram(s) IV Push once  dextrose 50% Injectable 12.5 Gram(s) IV Push once  dextrose 50% Injectable 25 Gram(s) IV Push once  DULoxetine 60 milliGRAM(s) Oral daily  furosemide    Tablet 20 milliGRAM(s) Oral daily  glucagon  Injectable 1 milliGRAM(s) IntraMuscular once  insulin lispro (ADMELOG) corrective regimen sliding scale   SubCutaneous three times a day before meals  insulin lispro (ADMELOG) corrective regimen sliding scale   SubCutaneous at bedtime  latanoprost 0.005% Ophthalmic Solution 1 Drop(s) Both EYES at bedtime  metoprolol succinate ER 50 milliGRAM(s) Oral daily  pancrelipase  (CREON 36,000 Lipase Units) 3 Capsule(s) Oral three times a day with meals  pantoprazole    Tablet 40 milliGRAM(s) Oral before breakfast  piperacillin/tazobactam IVPB.. 3.375 Gram(s) IV Intermittent every 8 hours  rivaroxaban 20 milliGRAM(s) Oral with dinner  tamsulosin 0.4 milliGRAM(s) Oral at bedtime  timolol 0.5% Solution 1 Drop(s) Both EYES every 12 hours    MEDICATIONS  (PRN):  acetaminophen     Tablet .. 650 milliGRAM(s) Oral every 6 hours PRN Temp greater or equal to 38C (100.4F), Mild Pain (1 - 3)  ALPRAZolam 0.25 milliGRAM(s) Oral at bedtime PRN Insomnia      FAMILY HISTORY:  Family history of stroke (Mother)    Vital Signs Last 24 Hrs  T(C): 36.4 (18 Mar 2022 11:56), Max: 36.6 (17 Mar 2022 17:42)  T(F): 97.6 (18 Mar 2022 11:56), Max: 97.9 (17 Mar 2022 17:42)  HR: 80 (18 Mar 2022 11:56) (67 - 80)  BP: 97/61 (18 Mar 2022 11:56) (97/61 - 127/82)  BP(mean): --  RR: 17 (18 Mar 2022 11:56) (16 - 18)  SpO2: 96% (18 Mar 2022 11:56) (94% - 100%)    PHYSICAL EXAM:  Vascular: DP palpable b/l, PT non-palpable b/l, Capillary refill 3 seconds to remaining digits, Digital hair absent bilaterally, right foot non-pitting edema.  Neurological: Light touch sensation diminished bilaterally.  Musculoskeletal: s/p right partial ray amputation. Hammered digits b/l. AJ & STJ ROM intact.  Dermatological: 1cm x 1.2cm x 0.2cm ulceration noted to the right dorsal digit, fibro:granular wound bed, (+) probe to bone, (+) periwound erythema, no malodor, proximal streaking extending to the midfoot, no fluctuance, serosanguinous drainage, no purulence.    CBC Full  -  ( 18 Mar 2022 08:54 )  WBC Count : 3.01 K/uL  RBC Count : 2.70 M/uL  Hemoglobin : 8.3 g/dL  Hematocrit : 25.9 %  Platelet Count - Automated : 135 K/uL  Mean Cell Volume : 95.9 fl  Mean Cell Hemoglobin : 30.7 pg  Mean Cell Hemoglobin Concentration : 32.0 gm/dL  Auto Neutrophil # : x  Auto Lymphocyte # : x  Auto Monocyte # : x  Auto Eosinophil # : x  Auto Basophil # : x  Auto Neutrophil % : x  Auto Lymphocyte % : x  Auto Monocyte % : x  Auto Eosinophil % : x  Auto Basophil % : x    03-18    140  |  108  |  13  ----------------------------<  129<H>  4.1   |  27  |  0.73    Ca    8.6      18 Mar 2022 08:54    TPro  7.9  /  Alb  3.6  /  TBili  0.7  /  DBili  x   /  AST  25  /  ALT  23  /  AlkPhos  121<H>  03-17   HPI:  81 year old Male with PMHx of pancreatic cancer (dx 3/2021, chemo) s/p ERCP s/p whipple 3/2021 and 10/26/21, HTN, HLD, HFrEF (Oct 2021 EF: 35%), CAD s/p stents x2 (~15 years ago), Vtach with AICD (implanted 2005, extracted and re-implantation 9/2021 and 10/4/21), TAVR (4/2021), bilateral PE (7/2021) on Xarelto, spinal stenosis, macular degeneration, GERD, BPH, Left THR (x2 revisions), left femur fracture (hardware), osteomyelitis s/p R hallux amputation 9/2021, MSSA/VRE bacteremia seen bedside today for infected right 2nd digit ulcer. Denies any pain to the area and does not have any other pedal complaints. Denies any fever, chills, nausea, vomiting, chest pain, shortness of breath, or calf pain at this time.    PAST MEDICAL & SURGICAL HISTORY:  HTN (hypertension)    HLD (hyperlipidemia)    GERD (gastroesophageal reflux disease)    Cardiomyopathy    MSSA bacteremia  9/2021    Acute osteomyelitis    Pulmonary embolism    Pancreas cancer  chemo    [place]  B/L hearing aids    History of total hip replacement  left X 1, 2 revisions    History of laminectomy  X3    ICD (implantable cardiac defibrillator) in place  implanted 2005, replaced 10/4/2021 [employer] Subcutaneous ICD    Benign testicular tumor  radiation    Status post amputation of toe of right foot  8/2021    Status post fracture of femur  2017 left with hardware    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    H/O Whipple procedure  2/2021        Allergies    Dilaudid (Anaphylaxis; Hives)    MEDICATIONS  (STANDING):  aMIOdarone    Tablet 200 milliGRAM(s) Oral daily  brimonidine 0.2% Ophthalmic Solution 1 Drop(s) Both EYES two times a day  dextrose 40% Gel 15 Gram(s) Oral once  dextrose 5%. 1000 milliLiter(s) (50 mL/Hr) IV Continuous <Continuous>  dextrose 5%. 1000 milliLiter(s) (100 mL/Hr) IV Continuous <Continuous>  dextrose 50% Injectable 25 Gram(s) IV Push once  dextrose 50% Injectable 12.5 Gram(s) IV Push once  dextrose 50% Injectable 25 Gram(s) IV Push once  DULoxetine 60 milliGRAM(s) Oral daily  furosemide    Tablet 20 milliGRAM(s) Oral daily  glucagon  Injectable 1 milliGRAM(s) IntraMuscular once  insulin lispro (ADMELOG) corrective regimen sliding scale   SubCutaneous three times a day before meals  insulin lispro (ADMELOG) corrective regimen sliding scale   SubCutaneous at bedtime  latanoprost 0.005% Ophthalmic Solution 1 Drop(s) Both EYES at bedtime  metoprolol succinate ER 50 milliGRAM(s) Oral daily  pancrelipase  (CREON 36,000 Lipase Units) 3 Capsule(s) Oral three times a day with meals  pantoprazole    Tablet 40 milliGRAM(s) Oral before breakfast  piperacillin/tazobactam IVPB.. 3.375 Gram(s) IV Intermittent every 8 hours  rivaroxaban 20 milliGRAM(s) Oral with dinner  tamsulosin 0.4 milliGRAM(s) Oral at bedtime  timolol 0.5% Solution 1 Drop(s) Both EYES every 12 hours    MEDICATIONS  (PRN):  acetaminophen     Tablet .. 650 milliGRAM(s) Oral every 6 hours PRN Temp greater or equal to 38C (100.4F), Mild Pain (1 - 3)  ALPRAZolam 0.25 milliGRAM(s) Oral at bedtime PRN Insomnia      FAMILY HISTORY:  Family history of stroke (Mother)    Vital Signs Last 24 Hrs  T(C): 36.4 (18 Mar 2022 11:56), Max: 36.6 (17 Mar 2022 17:42)  T(F): 97.6 (18 Mar 2022 11:56), Max: 97.9 (17 Mar 2022 17:42)  HR: 80 (18 Mar 2022 11:56) (67 - 80)  BP: 97/61 (18 Mar 2022 11:56) (97/61 - 127/82)  BP(mean): --  RR: 17 (18 Mar 2022 11:56) (16 - 18)  SpO2: 96% (18 Mar 2022 11:56) (94% - 100%)    PHYSICAL EXAM:  Vascular: DP palpable b/l, PT non-palpable b/l, Capillary refill 3 seconds to remaining digits, Digital hair absent bilaterally, right foot non-pitting edema.  Neurological: Light touch sensation diminished bilaterally.  Musculoskeletal: s/p right partial ray amputation. Hammered digits b/l. AJ & STJ ROM intact.  Dermatological: 1cm x 1.2cm x 0.2cm ulceration noted to the right 2nd digit dorsal PIPJ, fibro:granular wound bed, (+) probe to bone, (+) periwound erythema, no malodor, proximal streaking extending to the midfoot, no fluctuance, serosanguinous drainage, no purulence. Left 2nd digit dorsal PIPJ epithelialized lesion.    CBC Full  -  ( 18 Mar 2022 08:54 )  WBC Count : 3.01 K/uL  RBC Count : 2.70 M/uL  Hemoglobin : 8.3 g/dL  Hematocrit : 25.9 %  Platelet Count - Automated : 135 K/uL  Mean Cell Volume : 95.9 fl  Mean Cell Hemoglobin : 30.7 pg  Mean Cell Hemoglobin Concentration : 32.0 gm/dL  Auto Neutrophil # : x  Auto Lymphocyte # : x  Auto Monocyte # : x  Auto Eosinophil # : x  Auto Basophil # : x  Auto Neutrophil % : x  Auto Lymphocyte % : x  Auto Monocyte % : x  Auto Eosinophil % : x  Auto Basophil % : x    03-18    140  |  108  |  13  ----------------------------<  129<H>  4.1   |  27  |  0.73    Ca    8.6      18 Mar 2022 08:54    TPro  7.9  /  Alb  3.6  /  TBili  0.7  /  DBili  x   /  AST  25  /  ALT  23  /  AlkPhos  121<H>  03-17

## 2022-03-18 NOTE — CONSULT NOTE ADULT - PSYCHIATRIC
Completed form faxed to PA dept ( [facility]), confirmation attached and placed in Gregorio. MD matt accordion folder. Copy sent to scanning.    negative

## 2022-03-18 NOTE — CONSULT NOTE ADULT - SUBJECTIVE AND OBJECTIVE BOX
[facility]  INFECTIOUS DISEASES   [address] [place] [address]  Tel: [phone]     Fax: [phone]  ======================================================  MD Clement Lira Kaushal, MD Cho, Michelle, MD   ======================================================    MRN-[id number]  JUANIS BUSTAMANTESHAMAMAY     CC: Patient is a 81y old  Male who presents with a chief complaint of Diabetic foot ulcer (18 Mar 2022 08:25)    HPI:  80 year old male with PMH pancreatic cancer (dx 3/2021, chemo) s/p ERCP s/p whipple 3/2021 and 10/26/21, HTN, HLD, HFrEF (Oct 2021 EF: 35%), CAD s/p stents x2 (~15 years ago), Vtach with AICD (implanted , extracted and re-implantation 2021 and 10/4/21), TAVR (2021), bilateral PE (2021) on Xarelto, spinal stenosis, macular degeneration, GERD, BPH, Left THR (x2 revisions), left femur fracture (hardware), Osteomyelitis s/p R hallux amputation 2021, MSSA/VRE bacteremia presenting with wound of RLE 2nd digit. Patient follows with wound care for open wound of abdominal wall (present since Whipple in Oct 2021) and was scheduled for hyperbaric O2 treatment tomorrow when he noticed wound. Patient went to wound care center today for evaluation, who recommended he come to ER. At baseline, he does not have sensation in b/l LE. Denies any inciting trauma to digit. Patient denies any fevers, chills, purulent discharge at home.    ED Course:   97.3, 127/82, 67, 16, 100% RA   WBC wnl, Hb 9.9, ESR 56  Given: vanc, zosyn, 1L NS bolus (17 Mar 2022 19:22)      PAST MEDICAL & SURGICAL HISTORY:  HTN (hypertension)    HLD (hyperlipidemia)    GERD (gastroesophageal reflux disease)    Cardiomyopathy    MSSA bacteremia  2021    Acute osteomyelitis    Pulmonary embolism    Pancreas cancer  chemo, s/p Whipple    [place] (hard of hearing)  B/L hearing aids    History of total hip replacement  left X 1, 2 revisions    History of laminectomy  X3    ICD (implantable cardiac defibrillator) in place  implanted , replaced 10/4/2021 [employer] Subcutaneous ICD    Benign testicular tumor  radiation    Status post amputation of toe of right foot  2021    Status post fracture of femur  2017 left with hardware    S/P TAVR (transcatheter aortic valve replacement)  2021    H/O Whipple procedure  2021, 10/2021        Social Hx:     FAMILY HISTORY:  Family history of stroke (Mother)        Allergies    Dilaudid (Anaphylaxis; Hives)    Intolerances        Antibiotics:  MEDICATIONS  (STANDING):  aMIOdarone    Tablet 200 milliGRAM(s) Oral daily  brimonidine 0.2% Ophthalmic Solution 1 Drop(s) Both EYES two times a day  cefepime   IVPB 2000 milliGRAM(s) IV Intermittent every 8 hours  dextrose 40% Gel 15 Gram(s) Oral once  dextrose 5%. 1000 milliLiter(s) (50 mL/Hr) IV Continuous <Continuous>  dextrose 5%. 1000 milliLiter(s) (100 mL/Hr) IV Continuous <Continuous>  dextrose 50% Injectable 25 Gram(s) IV Push once  dextrose 50% Injectable 12.5 Gram(s) IV Push once  dextrose 50% Injectable 25 Gram(s) IV Push once  DULoxetine 60 milliGRAM(s) Oral daily  furosemide    Tablet 20 milliGRAM(s) Oral daily  glucagon  Injectable 1 milliGRAM(s) IntraMuscular once  insulin lispro (ADMELOG) corrective regimen sliding scale   SubCutaneous three times a day before meals  insulin lispro (ADMELOG) corrective regimen sliding scale   SubCutaneous at bedtime  latanoprost 0.005% Ophthalmic Solution 1 Drop(s) Both EYES at bedtime  metoprolol succinate ER 50 milliGRAM(s) Oral daily  pancrelipase  (CREON 36,000 Lipase Units) 3 Capsule(s) Oral three times a day with meals  pantoprazole    Tablet 40 milliGRAM(s) Oral before breakfast  rivaroxaban 20 milliGRAM(s) Oral with dinner  tamsulosin 0.4 milliGRAM(s) Oral at bedtime  timolol 0.5% Solution 1 Drop(s) Both EYES every 12 hours  vancomycin  IVPB 1250 milliGRAM(s) IV Intermittent every 12 hours    MEDICATIONS  (PRN):  acetaminophen     Tablet .. 650 milliGRAM(s) Oral every 6 hours PRN Temp greater or equal to 38C (100.4F), Mild Pain (1 - 3)  ALPRAZolam 0.25 milliGRAM(s) Oral at bedtime PRN Insomnia       REVIEW OF SYSTEMS:  CONSTITUTIONAL:  No Fever or chills  HEENT:  No diplopia or blurred vision.  No sore throat or runny nose.  CARDIOVASCULAR:  No chest pain or SOB.  RESPIRATORY:  No cough, shortness of breath, PND or orthopnea.  GASTROINTESTINAL:  No nausea, vomiting or diarrhea.  GENITOURINARY:  No dysuria, frequency or urgency. No Blood in urine  MUSCULOSKELETAL:  no joint aches, no muscle pain  SKIN:  No change in skin, hair or nails.  NEUROLOGIC:  No paresthesias, fasciculations, seizures or weakness.  PSYCHIATRIC:  No disorder of thought or mood.  ENDOCRINE:  No heat or cold intolerance, polyuria or polydipsia.  HEMATOLOGICAL:  No easy bruising or bleeding.     Physical Exam:  Vital Signs Last 24 Hrs  T(C): 36.6 (18 Mar 2022 04:59), Max: 36.6 (17 Mar 2022 17:42)  T(F): 97.8 (18 Mar 2022 04:59), Max: 97.9 (17 Mar 2022 17:42)  HR: 73 (18 Mar 2022 04:59) (67 - 79)  BP: 101/60 (18 Mar 2022 04:59) (101/60 - 127/82)  BP(mean): --  RR: 17 (18 Mar 2022 04:59) (16 - 18)  SpO2: 94% (18 Mar 2022 04:59) (94% - 100%)  Height (cm): 188 ( @ 15:49), 185.42 ( @ 15:00)  Weight (kg): 90.7 ( @ 15:49), 92.0793 ( @ 15:00)  BMI (kg/m2): 25.7 ( @ 15:49), 26.8 ( @ 15:00)  BSA (m2): 2.17 ( @ 15:49), 2.17 ( @ 15:00)  GEN: NAD  HEENT: normocephalic and atraumatic. EOMI. PERRL.    NECK: Supple.  No lymphadenopathy   LUNGS: Clear to auscultation.  HEART: Regular rate and rhythm without murmur.  ABDOMEN: Soft, nontender, and nondistended.  Positive bowel sounds.    : No CVA tenderness  EXTREMITIES: Without any cyanosis, clubbing, rash, lesions or edema.  NEUROLOGIC: grossly intact.  PSYCHIATRIC: Appropriate affect .  SKIN: No ulceration or induration present.    Labs:      137  |  102  |  14  ----------------------------<  110<H>  4.2   |  28  |  0.80    Ca    8.9      17 Mar 2022 17:54    TPro  7.9  /  Alb  3.6  /  TBili  0.7  /  DBili  x   /  AST  25  /  ALT  23  /  AlkPhos  121<H>                            8.3    3.01  )-----------( 135      ( 18 Mar 2022 08:54 )             25.9     PT/INR - ( 17 Mar 2022 17:54 )   PT: 21.7 sec;   INR: 1.84 ratio         PTT - ( 17 Mar 2022 17:54 )  PTT:37.0 sec  Urinalysis Basic - ( 17 Mar 2022 22:21 )    Color: Pale Yellow / Appearance: Clear / S.010 / pH: x  Gluc: x / Ketone: Negative  / Bili: Negative / Urobili: Negative   Blood: x / Protein: Negative / Nitrite: Negative   Leuk Esterase: Negative / RBC: x / WBC x   Sq Epi: x / Non Sq Epi: x / Bacteria: x      LIVER FUNCTIONS - ( 17 Mar 2022 17:54 )  Alb: 3.6 g/dL / Pro: 7.9 g/dL / ALK PHOS: 121 U/L / ALT: 23 U/L / AST: 25 U/L / GGT: x                   C-Reactive Protein, Serum: 15 mg/L (22 @ 21:53)    Sedimentation Rate, Erythrocyte: 56 mm/hr (22 @ 17:54)    COVID-19 PCR: NotDetec (22 @ 17:54)  COVID-19 PCR: NotDetec (22 @ 19:16)      RECENT CULTURES:        All imaging and other data have been reviewed.      Assessment and Plan:       Thank you for courtesy of this consult.     Will follow.  Discussed with the primary team.     Jaren Bell MD  Division of Infectious Diseases   Please call ID service at [phone] with any question.      55 minutes spent on total encounter assessing patient, examination, chart reivew, counseling and coordinating care by the attending physician/nurse/care manager.   [facility]  INFECTIOUS DISEASES   [address] [place] [address]  Tel: [phone]     Fax: [phone]  ======================================================  MD Clement Lira Kaushal, MD Cho, Michelle, MD   ======================================================    MRN-[id number]  JUANIS BUSTAMANTESHAMAMAY     CC: foot ulcer     HPI:  79 yo man with PMH of HTN, HFrEF, CAD, Vtach with AICD, TAVR, PEs, Spinal stenosis, BPH and Left THR with revisions, pancreatic cancer (dx 3/2021, chemo) s/p ERCP s/p whipple 3/2021 and 10/26/21, and Osteomyelitis of R hallux s/p amputation  and MSSA bacteremia was admitted from wound center with R 2nd toe chronic wound and infection. Last time hallux infection showed MSSA causing bacteremia and after surgery tissue culture was pseudomonas and staph Pettenkoferi (Oxacillin Sensitive). His 2nd toe is a hammer toe rubbing against shoes and causing ulcer. Mild cellulitis going up in dorsal side of foot. He has been started on cefepime and Vancomycin and ID was called for further recommendations.      PAST MEDICAL & SURGICAL HISTORY:  HTN (hypertension)  HLD (hyperlipidemia)  GERD (gastroesophageal reflux disease)  Cardiomyopathy  MSSA bacteremia  2021  Acute osteomyelitis  Pulmonary embolism  Pancreas cancer  chemo, s/p Whipple  [place] (hard of hearing)  B/L hearing aids  History of total hip replacement  left X 1, 2 revisions  History of laminectomyX3  ICD (implantable cardiac defibrillator) in place  implanted , replaced 10/4/2021 [employer] Subcutaneous ICD  Benign testicular tumor radiation  Status post amputation of toe of right foot2021  Status post fracture of femur  2017 left with hardware  S/P TAVR (transcatheter aortic valve replacement)2021  H/O Whipple procedure2021, 10/2021    Social Hx: no current smoking, ETOH or drugs     FAMILY HISTORY:  Family history of stroke (Mother)    Allergies  Dilaudid (Anaphylaxis; Hives)    Antibiotics:  MEDICATIONS  (STANDING):  aMIOdarone    Tablet 200 milliGRAM(s) Oral daily  brimonidine 0.2% Ophthalmic Solution 1 Drop(s) Both EYES two times a day  cefepime   IVPB 2000 milliGRAM(s) IV Intermittent every 8 hours  dextrose 40% Gel 15 Gram(s) Oral once  dextrose 5%. 1000 milliLiter(s) (50 mL/Hr) IV Continuous <Continuous>  dextrose 5%. 1000 milliLiter(s) (100 mL/Hr) IV Continuous <Continuous>  dextrose 50% Injectable 25 Gram(s) IV Push once  dextrose 50% Injectable 12.5 Gram(s) IV Push once  dextrose 50% Injectable 25 Gram(s) IV Push once  DULoxetine 60 milliGRAM(s) Oral daily  furosemide    Tablet 20 milliGRAM(s) Oral daily  glucagon  Injectable 1 milliGRAM(s) IntraMuscular once  insulin lispro (ADMELOG) corrective regimen sliding scale   SubCutaneous three times a day before meals  insulin lispro (ADMELOG) corrective regimen sliding scale   SubCutaneous at bedtime  latanoprost 0.005% Ophthalmic Solution 1 Drop(s) Both EYES at bedtime  metoprolol succinate ER 50 milliGRAM(s) Oral daily  pancrelipase  (CREON 36,000 Lipase Units) 3 Capsule(s) Oral three times a day with meals  pantoprazole    Tablet 40 milliGRAM(s) Oral before breakfast  rivaroxaban 20 milliGRAM(s) Oral with dinner  tamsulosin 0.4 milliGRAM(s) Oral at bedtime  timolol 0.5% Solution 1 Drop(s) Both EYES every 12 hours  vancomycin  IVPB 1250 milliGRAM(s) IV Intermittent every 12 hours     REVIEW OF SYSTEMS:  CONSTITUTIONAL:  No Fever or chills  HEENT:  No diplopia or blurred vision.  No sore throat or runny nose.  CARDIOVASCULAR:  No chest pain or SOB.  RESPIRATORY:  No cough, shortness of breath, PND or orthopnea.  GASTROINTESTINAL:  No nausea, vomiting or diarrhea.  GENITOURINARY:  No dysuria, frequency or urgency. No Blood in urine  MUSCULOSKELETAL:  no joint aches, no muscle pain  SKIN:  foot ulcer   NEUROLOGIC:  No paresthesias, fasciculations, seizures or weakness.  PSYCHIATRIC:  No disorder of thought or mood.  ENDOCRINE:  No heat or cold intolerance, polyuria or polydipsia.  HEMATOLOGICAL:  No easy bruising or bleeding.     Physical Exam:  Vital Signs Last 24 Hrs  T(C): 36.6 (18 Mar 2022 04:59), Max: 36.6 (17 Mar 2022 17:42)  T(F): 97.8 (18 Mar 2022 04:59), Max: 97.9 (17 Mar 2022 17:42)  HR: 73 (18 Mar 2022 04:59) (67 - 79)  BP: 101/60 (18 Mar 2022 04:59) (101/60 - 127/82)  BP(mean): --  RR: 17 (18 Mar 2022 04:59) (16 - 18)  SpO2: 94% (18 Mar 2022 04:59) (94% - 100%)  Height (cm): 188 ( @ 15:49), 185.42 ( @ 15:00)  Weight (kg): 90.7 ( @ 15:49), 92.0793 ( @ 15:00)  BMI (kg/m2): 25.7 (- @ 15:49), 26.8 ( @ 15:00)  BSA (m2): 2.17 (- @ 15:49), 2.17 ( @ 15:00)  GEN: NAD, obese   HEENT: normocephalic and atraumatic. EOMI. PERRL.    NECK: Supple.  No lymphadenopathy  LUNGS: Clear to auscultation.  HEART: Regular rate and rhythm, Left lower chest AICD  ABDOMEN: Soft, nontender, and nondistended. Open wound will be checked by wound care nurse    : No CVA tenderness  EXTREMITIES: R foot s/p hallux amputation, 2nd toe is a hammer toe,   dorsal mid-digit ulcer with bloody purulent discharge and open wound that probes to bone.   NEUROLOGIC: grossly intact.  PSYCHIATRIC: Appropriate affect .  SKIN: No rash    Labs:      137  |  102  |  14  ----------------------------<  110<H>  4.2   |  28  |  0.80    Ca    8.9      17 Mar 2022 17:54    TPro  7.9  /  Alb  3.6  /  TBili  0.7  /  DBili  x   /  AST  25  /  ALT  23  /  AlkPhos  121<H>                          8.3    3.01  )-----------( 135      ( 18 Mar 2022 08:54 )             25.9     PT/INR - ( 17 Mar 2022 17:54 )   PT: 21.7 sec;   INR: 1.84 ratio    PTT - ( 17 Mar 2022 17:54 )  PTT:37.0 sec  Urinalysis Basic - ( 17 Mar 2022 22:21 )    Color: Pale Yellow / Appearance: Clear / S.010 / pH: x  Gluc: x / Ketone: Negative  / Bili: Negative / Urobili: Negative   Blood: x / Protein: Negative / Nitrite: Negative   Leuk Esterase: Negative / RBC: x / WBC x   Sq Epi: x / Non Sq Epi: x / Bacteria: x    LIVER FUNCTIONS - ( 17 Mar 2022 17:54 )  Alb: 3.6 g/dL / Pro: 7.9 g/dL / ALK PHOS: 121 U/L / ALT: 23 U/L / AST: 25 U/L / GGT: x           C-Reactive Protein, Serum: 15 mg/L (22 @ 21:53)    Sedimentation Rate, Erythrocyte: 56 mm/hr (22 @ 17:54)    COVID-19 PCR: NotDetec (22 @ 17:54)  COVID-19 PCR: NotDetec (22 @ 19:16)    All imaging and other data have been reviewed.  < from: Xray Foot AP + Lateral + Oblique, Right (22 @ 17:36) >  ACC: [id number] EXAM:  XR CHEST PORTABLE IMMED 1V                        ACC: [id number] EXAM:  XR FOOT COMP MIN 3 VIEWS RT                        PROCEDURE DATE:  2022    INTERPRETATION:  Right foot and chest. Patient has an infection ofthe   second toe.  Right foot. 3 views.  Again noted is a well-healed amputation of the distal first metatarsal.  There is mild hammertoe deformity but likely of the second toe. No active   bone destruction or fracture evident. Findings similarto 2021.  AP semierect chest on 2022 at 5:18 PM.  Elevated diaphragms crowds the chest.  Heart magnified by technique.  Left epicardial pacemaker and very extensive thoracolumbar hardware again   noted.  Persistent mild left base atelectasis.  Findings similar to CAT scan of  of this year.  Temporary aortic valve again noted.  IMPRESSION: Stable right foot findings. Persistent minimal atelectasis   left base. Other findings as above.    Assessment and Plan:   79 yo man with PMH of HTN, HFrEF, CAD, Vtach with AICD, TAVR, PEs, Spinal stenosis, BPH and Left THR with revisions, pancreatic cancer (dx 3/2021, chemo) s/p ERCP s/p whipple 3/2021 and 10/26/21, and Osteomyelitis of R hallux s/p amputation  and MSSA bacteremia was admitted from wound center with R 2nd toe chronic wound and infection.   Last time hallux infection showed MSSA causing bacteremia and after surgery tissue culture was pseudomonas and staph Pettenkoferi (Oxacillin Sensitive).  ESR 56 and CRP 15   Xray with no bone involvement     He had pseudomonas and staph aureus and staph Pettenkoferi both Sensitive, in last admission so I would cover both until cultures are back. Swelling and erythema is improving as per podiatry.     R 2nd toe cellulitis and chronic wound   - Blood culture x 2   - Wound culture   - Bone scan is ordered (due to AICD and hardwares unable to perform MRI)  - Podiatry and vascular are on the case   - Stop cefepime and vancomycin   - Start Zosyn 3.375gm q8h to cover above bacteria for now   - Possibly will need amputation   Dr. Peñaloza is covering this weekend.     Thank you for courtesy of this consult.     Will follow.  Discussed with the primary team.     Jaren Bell MD  Division of Infectious Diseases   Please call ID service at [phone] with any question.      55 minutes spent on total encounter assessing patient, examination, chart reivew, counseling and coordinating care by the attending physician/nurse/care manager.   [facility]  INFECTIOUS DISEASES   [address] [place] [address]  Tel: [phone]     Fax: [phone]  ======================================================  MD Clement Lira Kaushal, MD Cho, Michelle, MD   ======================================================    MRN-[id number]  JUANIS BUSTAMANTESHAMAMAY     CC: foot ulcer     HPI:  81 yo man with PMH of HTN, HFrEF, CAD, Vtach with AICD, TAVR, PEs, Spinal stenosis, BPH and Left THR with revisions, pancreatic cancer (dx 3/2021, chemo) s/p ERCP s/p whipple 3/2021 and 10/26/21, and Osteomyelitis of R hallux s/p amputation  and MSSA bacteremia was admitted from wound center with R 2nd toe chronic wound and infection. Last time hallux infection showed MSSA causing bacteremia and after surgery tissue culture was pseudomonas and staph Pettenkoferi (Oxacillin Sensitive). His 2nd toe is a hammer toe rubbing against shoes and causing ulcer. Mild cellulitis going up in dorsal side of foot. He has been started on cefepime and Vancomycin and ID was called for further recommendations.      PAST MEDICAL & SURGICAL HISTORY:  HTN (hypertension)  HLD (hyperlipidemia)  GERD (gastroesophageal reflux disease)  Cardiomyopathy  MSSA bacteremia  2021  Acute osteomyelitis  Pulmonary embolism  Pancreas cancer  chemo, s/p Whipple  [place] (hard of hearing)  B/L hearing aids  History of total hip replacement  left X 1, 2 revisions  History of laminectomyX3  ICD (implantable cardiac defibrillator) in place  implanted , replaced 10/4/2021 [employer] Subcutaneous ICD  Benign testicular tumor radiation  Status post amputation of toe of right foot2021  Status post fracture of femur  2017 left with hardware  S/P TAVR (transcatheter aortic valve replacement)2021  H/O Whipple procedure2021, 10/2021    Social Hx: no current smoking, ETOH or drugs     FAMILY HISTORY:  Family history of stroke (Mother)    Allergies  Dilaudid (Anaphylaxis; Hives)    Antibiotics:  MEDICATIONS  (STANDING):  aMIOdarone    Tablet 200 milliGRAM(s) Oral daily  brimonidine 0.2% Ophthalmic Solution 1 Drop(s) Both EYES two times a day  cefepime   IVPB 2000 milliGRAM(s) IV Intermittent every 8 hours  dextrose 40% Gel 15 Gram(s) Oral once  dextrose 5%. 1000 milliLiter(s) (50 mL/Hr) IV Continuous <Continuous>  dextrose 5%. 1000 milliLiter(s) (100 mL/Hr) IV Continuous <Continuous>  dextrose 50% Injectable 25 Gram(s) IV Push once  dextrose 50% Injectable 12.5 Gram(s) IV Push once  dextrose 50% Injectable 25 Gram(s) IV Push once  DULoxetine 60 milliGRAM(s) Oral daily  furosemide    Tablet 20 milliGRAM(s) Oral daily  glucagon  Injectable 1 milliGRAM(s) IntraMuscular once  insulin lispro (ADMELOG) corrective regimen sliding scale   SubCutaneous three times a day before meals  insulin lispro (ADMELOG) corrective regimen sliding scale   SubCutaneous at bedtime  latanoprost 0.005% Ophthalmic Solution 1 Drop(s) Both EYES at bedtime  metoprolol succinate ER 50 milliGRAM(s) Oral daily  pancrelipase  (CREON 36,000 Lipase Units) 3 Capsule(s) Oral three times a day with meals  pantoprazole    Tablet 40 milliGRAM(s) Oral before breakfast  rivaroxaban 20 milliGRAM(s) Oral with dinner  tamsulosin 0.4 milliGRAM(s) Oral at bedtime  timolol 0.5% Solution 1 Drop(s) Both EYES every 12 hours  vancomycin  IVPB 1250 milliGRAM(s) IV Intermittent every 12 hours     REVIEW OF SYSTEMS:  CONSTITUTIONAL:  No Fever or chills  HEENT:  No diplopia or blurred vision.  No sore throat or runny nose.  CARDIOVASCULAR:  No chest pain or SOB.  RESPIRATORY:  No cough, shortness of breath, PND or orthopnea.  GASTROINTESTINAL:  No nausea, vomiting or diarrhea.  GENITOURINARY:  No dysuria, frequency or urgency. No Blood in urine  MUSCULOSKELETAL:  no joint aches, no muscle pain  SKIN:  foot ulcer   NEUROLOGIC:  No paresthesias, fasciculations, seizures or weakness.  PSYCHIATRIC:  No disorder of thought or mood.  ENDOCRINE:  No heat or cold intolerance, polyuria or polydipsia.  HEMATOLOGICAL:  No easy bruising or bleeding.     Physical Exam:  Vital Signs Last 24 Hrs  T(C): 36.6 (18 Mar 2022 04:59), Max: 36.6 (17 Mar 2022 17:42)  T(F): 97.8 (18 Mar 2022 04:59), Max: 97.9 (17 Mar 2022 17:42)  HR: 73 (18 Mar 2022 04:59) (67 - 79)  BP: 101/60 (18 Mar 2022 04:59) (101/60 - 127/82)  BP(mean): --  RR: 17 (18 Mar 2022 04:59) (16 - 18)  SpO2: 94% (18 Mar 2022 04:59) (94% - 100%)  Height (cm): 188 ( @ 15:49), 185.42 ( @ 15:00)  Weight (kg): 90.7 ( @ 15:49), 92.0793 ( @ 15:00)  BMI (kg/m2): 25.7 (- @ 15:49), 26.8 ( @ 15:00)  BSA (m2): 2.17 (- @ 15:49), 2.17 ( @ 15:00)  GEN: NAD, obese   HEENT: normocephalic and atraumatic. EOMI. PERRL.    NECK: Supple.  No lymphadenopathy  LUNGS: Clear to auscultation.  HEART: Regular rate and rhythm, Left lower chest AICD  ABDOMEN: Soft, nontender, and nondistended.   Open wound mid abdomen with serous discharge, no sign of infection, healing from edges    No sign of infection or cellulitis.   : No CVA tenderness  EXTREMITIES: R foot s/p hallux amputation, 2nd toe is a hammer toe,   dorsal mid-digit ulcer with bloody purulent discharge and open wound that probes to bone.   NEUROLOGIC: grossly intact.  PSYCHIATRIC: Appropriate affect .  SKIN: No rash    Labs:      137  |  102  |  14  ----------------------------<  110<H>  4.2   |  28  |  0.80    Ca    8.9      17 Mar 2022 17:54    TPro  7.9  /  Alb  3.6  /  TBili  0.7  /  DBili  x   /  AST  25  /  ALT  23  /  AlkPhos  121<H>                          8.3    3.01  )-----------( 135      ( 18 Mar 2022 08:54 )             25.9     PT/INR - ( 17 Mar 2022 17:54 )   PT: 21.7 sec;   INR: 1.84 ratio    PTT - ( 17 Mar 2022 17:54 )  PTT:37.0 sec  Urinalysis Basic - ( 17 Mar 2022 22:21 )    Color: Pale Yellow / Appearance: Clear / S.010 / pH: x  Gluc: x / Ketone: Negative  / Bili: Negative / Urobili: Negative   Blood: x / Protein: Negative / Nitrite: Negative   Leuk Esterase: Negative / RBC: x / WBC x   Sq Epi: x / Non Sq Epi: x / Bacteria: x    LIVER FUNCTIONS - ( 17 Mar 2022 17:54 )  Alb: 3.6 g/dL / Pro: 7.9 g/dL / ALK PHOS: 121 U/L / ALT: 23 U/L / AST: 25 U/L / GGT: x           C-Reactive Protein, Serum: 15 mg/L (22 @ 21:53)    Sedimentation Rate, Erythrocyte: 56 mm/hr (22 @ 17:54)    COVID-19 PCR: NotDetec (22 @ 17:54)  COVID-19 PCR: NotDetec (22 @ 19:16)    All imaging and other data have been reviewed.  < from: Xray Foot AP + Lateral + Oblique, Right (22 @ 17:36) >  ACC: [id number] EXAM:  XR CHEST PORTABLE IMMED 1V                        ACC: [id number] EXAM:  XR FOOT COMP MIN 3 VIEWS RT                        PROCEDURE DATE:  2022    INTERPRETATION:  Right foot and chest. Patient has an infection ofthe   second toe.  Right foot. 3 views.  Again noted is a well-healed amputation of the distal first metatarsal.  There is mild hammertoe deformity but likely of the second toe. No active   bone destruction or fracture evident. Findings similarto 2021.  AP semierect chest on 2022 at 5:18 PM.  Elevated diaphragms crowds the chest.  Heart magnified by technique.  Left epicardial pacemaker and very extensive thoracolumbar hardware again   noted.  Persistent mild left base atelectasis.  Findings similar to CAT scan of  of this year.  Temporary aortic valve again noted.  IMPRESSION: Stable right foot findings. Persistent minimal atelectasis   left base. Other findings as above.    Assessment and Plan:   81 yo man with PMH of HTN, HFrEF, CAD, Vtach with AICD, TAVR, PEs, Spinal stenosis, BPH and Left THR with revisions, pancreatic cancer (dx 3/2021, chemo) s/p ERCP s/p whipple 3/2021 and 10/26/21, and Osteomyelitis of R hallux s/p amputation  and MSSA bacteremia was admitted from wound center with R 2nd toe chronic wound and infection.   Last time hallux infection showed MSSA causing bacteremia and after surgery tissue culture was pseudomonas and staph Pettenkoferi (Oxacillin Sensitive).  ESR 56 and CRP 15   Xray with no bone involvement     He had pseudomonas and staph aureus and staph Pettenkoferi both Sensitive, in last admission so I would cover both until cultures are back. Swelling and erythema is improving as per podiatry.     R 2nd toe cellulitis and chronic wound   - Blood culture x 2   - Wound culture   - Bone scan is ordered (due to AICD and hardwares unable to perform MRI)  - Podiatry and vascular are on the case   - Stop cefepime and vancomycin   - Start Zosyn 3.375gm q8h to cover above bacteria for now   - Possibly will need amputation   Dr. Peñaloza is covering this weekend.     Thank you for courtesy of this consult.     Will follow.  Discussed with the primary team.     Jaren Bell MD  Division of Infectious Diseases   Please call ID service at [phone] with any question.      55 minutes spent on total encounter assessing patient, examination, chart reivew, counseling and coordinating care by the attending physician/nurse/care manager.

## 2022-03-18 NOTE — PATIENT PROFILE ADULT - FALL HARM RISK - HARM RISK INTERVENTIONS

## 2022-03-18 NOTE — PROGRESS NOTE ADULT - SUBJECTIVE AND OBJECTIVE BOX
SUBJECTIVE:    No acute events overnight, afebrile, hds.    VITAL SIGNS:    Vital Signs Last 24 Hrs  T(C): 36.4 (18 Mar 2022 11:56), Max: 36.6 (17 Mar 2022 17:42)  T(F): 97.6 (18 Mar 2022 11:56), Max: 97.9 (17 Mar 2022 17:42)  HR: 80 (18 Mar 2022 11:56) (70 - 80)  BP: 97/61 (18 Mar 2022 11:56) (97/61 - 118/66)  BP(mean): --  RR: 17 (18 Mar 2022 11:56) (17 - 18)  SpO2: 96% (18 Mar 2022 11:56) (94% - 100%)    PHYSICAL EXAM:     GENERAL: no acute distress  HEENT: NC/AT, EOMI, neck supple, MMM  RESPIRATORY: LCTAB/L, no rhonchi, rales, or wheezing  CARDIOVASCULAR: RRR, no murmurs, gallops, rubs  ABDOMINAL: soft, non-tender, non-distended, positive bowel sounds   EXTREMITIES: no clubbing, cyanosis, or edema  NEUROLOGICAL: alert and oriented x 3, non-focal  SKIN: no rashes or lesions   MUSCULOSKELETAL: no gross joint deformity                          8.3    3.01  )-----------( 135      ( 18 Mar 2022 08:54 )             25.9     03-18    140  |  108  |  13  ----------------------------<  129<H>  4.1   |  27  |  0.73    Ca    8.6      18 Mar 2022 08:54    TPro  7.9  /  Alb  3.6  /  TBili  0.7  /  DBili  x   /  AST  25  /  ALT  23  /  AlkPhos  121<H>  03-17      CAPILLARY BLOOD GLUCOSE      POCT Blood Glucose.: 210 mg/dL (18 Mar 2022 11:33)  POCT Blood Glucose.: 137 mg/dL (18 Mar 2022 07:57)  POCT Blood Glucose.: 138 mg/dL (17 Mar 2022 23:24)      ACC: [id number] EXAM:  XR CHEST PORTABLE IMMED 1V                        ACC: [id number] EXAM:  XR FOOT COMP MIN 3 VIEWS RT                          PROCEDURE DATE:  03/17/2022          INTERPRETATION:  Right foot and chest. Patient has an infection of the   second toe.    Right foot. 3 views.    Again noted is a well-healed amputation of the distal first metatarsal.    There is mild hammertoe deformity but likely of the second toe. No active   bone destruction or fracture evident. Findings similar to October 28, 2021.    AP semierect chest on March 17, 2022 at 5:18 PM.    Elevated diaphragms crowds the chest.    Heart magnified by technique.    Left epicardial pacemaker and very extensive thoracolumbar hardware again   noted.    Persistent mild left base atelectasis.    Findings similar to CAT scan of March 7 of this year.    Temporary aortic valve again noted.    IMPRESSION: Stable right foot findings. Persistent minimal atelectasis   left base. Other findings as above.    MEDICATIONS  (STANDING):  aluminum hydroxide/magnesium hydroxide/simethicone Suspension 30 milliLiter(s) Oral every 6 hours  aMIOdarone    Tablet 200 milliGRAM(s) Oral daily  brimonidine 0.2% Ophthalmic Solution 1 Drop(s) Both EYES two times a day  dextrose 40% Gel 15 Gram(s) Oral once  dextrose 5%. 1000 milliLiter(s) (50 mL/Hr) IV Continuous <Continuous>  dextrose 5%. 1000 milliLiter(s) (100 mL/Hr) IV Continuous <Continuous>  dextrose 50% Injectable 25 Gram(s) IV Push once  dextrose 50% Injectable 12.5 Gram(s) IV Push once  dextrose 50% Injectable 25 Gram(s) IV Push once  DULoxetine 60 milliGRAM(s) Oral daily  furosemide    Tablet 20 milliGRAM(s) Oral daily  glucagon  Injectable 1 milliGRAM(s) IntraMuscular once  insulin lispro (ADMELOG) corrective regimen sliding scale   SubCutaneous three times a day before meals  insulin lispro (ADMELOG) corrective regimen sliding scale   SubCutaneous at bedtime  latanoprost 0.005% Ophthalmic Solution 1 Drop(s) Both EYES at bedtime  metoprolol succinate ER 50 milliGRAM(s) Oral daily  pancrelipase  (CREON 36,000 Lipase Units) 3 Capsule(s) Oral three times a day with meals  pantoprazole    Tablet 40 milliGRAM(s) Oral before breakfast  piperacillin/tazobactam IVPB.. 3.375 Gram(s) IV Intermittent every 8 hours  rivaroxaban 20 milliGRAM(s) Oral with dinner  tamsulosin 0.4 milliGRAM(s) Oral at bedtime  timolol 0.5% Solution 1 Drop(s) Both EYES every 12 hours       SUBJECTIVE:    No acute events overnight, afebrile, hds.  Some mild abdominal discomfort bloating.    Some pain in lower ext and abdomen.    VITAL SIGNS:    Vital Signs Last 24 Hrs  T(C): 36.4 (18 Mar 2022 11:56), Max: 36.6 (17 Mar 2022 17:42)  T(F): 97.6 (18 Mar 2022 11:56), Max: 97.9 (17 Mar 2022 17:42)  HR: 80 (18 Mar 2022 11:56) (70 - 80)  BP: 97/61 (18 Mar 2022 11:56) (97/61 - 118/66)  BP(mean): --  RR: 17 (18 Mar 2022 11:56) (17 - 18)  SpO2: 96% (18 Mar 2022 11:56) (94% - 100%)    PHYSICAL EXAM:     GENERAL: no acute distress  HEENT: NC/AT, EOMI, neck supple, MMM  RESPIRATORY: LCTAB/L, no rhonchi, rales, or wheezing  CARDIOVASCULAR: RRR, no murmurs, gallops, rubs  ABDOMINAL: soft, non-tender, non-distended, positive bowel sounds; open wound with serous discharge   EXTREMITIES: no clubbing, cyanosis, or edema; R foot s/p hallux amputation, 2nd toe is a hammer toe,   dorsal mid-digit ulcer with bloody purulent discharge and open wound that probes to bone  NEUROLOGICAL: alert and oriented x 3, non-focal  MUSCULOSKELETAL: no gross joint deformity                          8.3    3.01  )-----------( 135      ( 18 Mar 2022 08:54 )             25.9     03-18    140  |  108  |  13  ----------------------------<  129<H>  4.1   |  27  |  0.73    Ca    8.6      18 Mar 2022 08:54    TPro  7.9  /  Alb  3.6  /  TBili  0.7  /  DBili  x   /  AST  25  /  ALT  23  /  AlkPhos  121<H>  03-17      CAPILLARY BLOOD GLUCOSE      POCT Blood Glucose.: 210 mg/dL (18 Mar 2022 11:33)  POCT Blood Glucose.: 137 mg/dL (18 Mar 2022 07:57)  POCT Blood Glucose.: 138 mg/dL (17 Mar 2022 23:24)      ACC: [id number] EXAM:  XR CHEST PORTABLE IMMED 1V                        ACC: [id number] EXAM:  XR FOOT COMP MIN 3 VIEWS RT                          PROCEDURE DATE:  03/17/2022          INTERPRETATION:  Right foot and chest. Patient has an infection of the   second toe.    Right foot. 3 views.    Again noted is a well-healed amputation of the distal first metatarsal.    There is mild hammertoe deformity but likely of the second toe. No active   bone destruction or fracture evident. Findings similar to October 28, 2021.    AP semierect chest on March 17, 2022 at 5:18 PM.    Elevated diaphragms crowds the chest.    Heart magnified by technique.    Left epicardial pacemaker and very extensive thoracolumbar hardware again   noted.    Persistent mild left base atelectasis.    Findings similar to CAT scan of March 7 of this year.    Temporary aortic valve again noted.    IMPRESSION: Stable right foot findings. Persistent minimal atelectasis   left base. Other findings as above.    MEDICATIONS  (STANDING):  aluminum hydroxide/magnesium hydroxide/simethicone Suspension 30 milliLiter(s) Oral every 6 hours  aMIOdarone    Tablet 200 milliGRAM(s) Oral daily  brimonidine 0.2% Ophthalmic Solution 1 Drop(s) Both EYES two times a day  dextrose 40% Gel 15 Gram(s) Oral once  dextrose 5%. 1000 milliLiter(s) (50 mL/Hr) IV Continuous <Continuous>  dextrose 5%. 1000 milliLiter(s) (100 mL/Hr) IV Continuous <Continuous>  dextrose 50% Injectable 25 Gram(s) IV Push once  dextrose 50% Injectable 12.5 Gram(s) IV Push once  dextrose 50% Injectable 25 Gram(s) IV Push once  DULoxetine 60 milliGRAM(s) Oral daily  furosemide    Tablet 20 milliGRAM(s) Oral daily  glucagon  Injectable 1 milliGRAM(s) IntraMuscular once  insulin lispro (ADMELOG) corrective regimen sliding scale   SubCutaneous three times a day before meals  insulin lispro (ADMELOG) corrective regimen sliding scale   SubCutaneous at bedtime  latanoprost 0.005% Ophthalmic Solution 1 Drop(s) Both EYES at bedtime  metoprolol succinate ER 50 milliGRAM(s) Oral daily  pancrelipase  (CREON 36,000 Lipase Units) 3 Capsule(s) Oral three times a day with meals  pantoprazole    Tablet 40 milliGRAM(s) Oral before breakfast  piperacillin/tazobactam IVPB.. 3.375 Gram(s) IV Intermittent every 8 hours  rivaroxaban 20 milliGRAM(s) Oral with dinner  tamsulosin 0.4 milliGRAM(s) Oral at bedtime  timolol 0.5% Solution 1 Drop(s) Both EYES every 12 hours

## 2022-03-18 NOTE — PATIENT PROFILE ADULT - VISION (WITH CORRECTIVE LENSES IF THE PATIENT USUALLY WEARS THEM):
eye glassess/Partially impaired: cannot see medication labels or newsprint, but can see obstacles in path, and the surrounding layout; can count fingers at arm's length

## 2022-03-19 NOTE — PROGRESS NOTE ADULT - ASSESSMENT
80 year old male with PMH pancreatic cancer (dx 3/2021, chemo) s/p ERCP s/p whipple 3/2021 and 10/26/21, HTN, HLD, HFrEF (Oct 2021 EF: 35%), CAD s/p stents x2 (~15 years ago), Vtach with AICD (implanted 2005, extracted and re-implantation 9/2021 and 10/4/21), TAVR (4/2021), bilateral PE (7/2021) on Xarelto, spinal stenosis, macular degeneration, GERD, BPH, Left THR (x2 revisions), left femur fracture (hardware), Osteomyelitis s/p R hallux amputation 9/2021, MSSA/VRE bacteremia admitted for infected wound of RLE 2nd digit.      Problem/Plan - 1:  ·  Problem: Diabetic toe ulcer.   ·  Plan: Sent by wound care for ulceration of RLE 2nd digit  Sep 2021 episode of osteomyelitis of R hallux s/p amputation   Afebrile, no leukocytosis, Lactate wnl, elevated ESR  Care d/w ID, pt now off vanco and cefepime, changed to zosyn  Tylenol PRN for fevers  f/u blood cultures  Check bone scan - patient cannot have MRI due to AICD   RLE xray shows no bone destruction or fracture, s/p amputation of distal 1st metatarsal  WBAT  Surgical intervention pending results of above  Podiatry consulted, f/u recs  ID Dr Bell consulted, appreciate recs; as per d/w ID, wound care consult called, and recs reviewed  Vascular consulted, appreciate recs - no indication for surgical intervention at this time  Pain control with tylenol for mild pain, tramadol added for moderate pain     Problem/Plan - 2:  ·  Problem: Chronic HFrEF (heart failure with reduced ejection fraction).   ·  Plan: Oct 2021 EF 35% with global LV dysfunction  Continue home lasix   Continue home metoprolol  CXR with some atelectasis, no PNA, consolidation, or effusions  Patient has AICD - not MRI compatible  apprec cardio consult and poss optimization.     Problem/Plan - 3:  ·  Problem: Pulmonary embolism.   ·  Plan: July 2021 b/l PEs  Continue home xarelto.     Problem/Plan - 4:  ·  Problem: T2DM (type 2 diabetes mellitus).   ·  Plan: Check HbA1c   Start insulin corrective scale while inpatient  Routine glycemic monitoring.     Problem/Plan - 5:  ·  Problem: Ventricular tachyarrhythmia.   ·  Plan: Hx of Vtach during previous admission at [facility] for Whipple  Has AICD in place (reimplanted Oct 2021 due to bacteremia)  Continue home amiodarone.     Problem/Plan - 6:  ·  Problem: Pancreatic cancer.   ·  Plan: s/p Whipple x2, most recent Oct 2021  Continue home pancrelipase  Abdominal wound in midline, receives hyperbaric O2 therapy  Wound care consulted, appreciate recs  Some abdominal bloating - maalox started     Problem/Plan - 7:  ·  Problem: Macular degeneration.   ·  Plan: Continue home regimen: brimonidine, timolol, latonoprost.     Problem/Plan - 8:  ·  Problem: BPH (benign prostatic hyperplasia).   ·  Plan: Continue home flomax.     Problem/Plan - 9:  ·  Problem: GERD (gastroesophageal reflux disease).   ·  Plan: Continue home protonix.     Problem/Plan - 10:  ·  Problem: Need for prophylactic measure.   ·  Plan; DVT ppx: xarelto.   80 year old male with PMH pancreatic cancer (dx 3/2021, chemo) s/p ERCP s/p whipple 3/2021 and 10/26/21, HTN, HLD, HFrEF (Oct 2021 EF: 35%), CAD s/p stents x2 (~15 years ago), Vtach with AICD (implanted 2005, extracted and re-implantation 9/2021 and 10/4/21), TAVR (4/2021), bilateral PE (7/2021) on Xarelto, spinal stenosis, macular degeneration, GERD, BPH, Left THR (x2 revisions), left femur fracture (hardware), Osteomyelitis s/p R hallux amputation 9/2021, MSSA/VRE bacteremia admitted for infected wound of RLE 2nd digit.      Problem/Plan - 1:  ·  Problem: Diabetic toe ulcer.   ·  Plan: Sent by wound care for ulceration of RLE 2nd digit  Sep 2021 episode of osteomyelitis of R hallux s/p amputation   Afebrile, no leukocytosis, Lactate wnl, elevated ESR  Care d/w ID, pt now off vanco and cefepime, changed to zosyn  Tylenol PRN for fevers  3/17 blood cx show ngtd, and other tissue cx shows normal skin davonte  Check bone scan - patient cannot have MRI due to AICD   RLE xray shows no bone destruction or fracture, s/p amputation of distal 1st metatarsal  WBAT  Surgical intervention pending results of above  Podiatry consulted, f/u recs  ID Dr Bell consulted, appreciate recs; as per d/w ID, wound care consult called, and recs reviewed  Vascular consulted, appreciate recs - no indication for surgical intervention at this time  Pain control with tylenol for mild pain, tramadol added for moderate pain, pt states it helps and would like to have it scheduled regularly at bedtime     Problem/Plan - 2:  ·  Problem: Chronic HFrEF (heart failure with reduced ejection fraction).   ·  Plan: Oct 2021 EF 35% with global LV dysfunction  Continue home lasix   Continue home metoprolol  CXR with some atelectasis, no PNA, consolidation, or effusions  Patient has AICD - not MRI compatible  apprec cardio consult and poss optimization.     Problem/Plan - 3:  ·  Problem: Pulmonary embolism.   ·  Plan: July 2021 b/l PEs  Continue home xarelto.     Problem/Plan - 4:  ·  Problem: T2DM (type 2 diabetes mellitus).   ·  Plan: HbA1c at 5.8, controlled  Start insulin corrective scale while inpatient  Routine glycemic monitoring.     Problem/Plan - 5:  ·  Problem: Ventricular tachyarrhythmia.   ·  Plan: Hx of Vtach during previous admission at [facility] for Whipple  Has AICD in place (reimplanted Oct 2021 due to bacteremia)  Continue home amiodarone.     Problem/Plan - 6:  ·  Problem: Pancreatic cancer.   ·  Plan: s/p Whipple x2, most recent Oct 2021  Continue home pancrelipase  Abdominal wound in midline, receives hyperbaric O2 therapy  Wound care consulted, appreciate recs  Some abdominal bloating - maalox started     Problem/Plan - 7:  ·  Problem: Macular degeneration.   ·  Plan: Continue home regimen: brimonidine, timolol, latonoprost.     Problem/Plan - 8:  ·  Problem: BPH (benign prostatic hyperplasia).   ·  Plan: Continue home flomax.     Problem/Plan - 9:  ·  Problem: GERD (gastroesophageal reflux disease).   ·  Plan: Continue home protonix.     Problem/Plan - 10:  ·  Problem: Need for prophylactic measure.   ·  Plan; DVT ppx: xarelto.

## 2022-03-19 NOTE — PROGRESS NOTE ADULT - ASSESSMENT
79 yo male with PMH of HTN, HFrEF, CAD, Vtach with AICD, TAVR, PEs, Spinal stenosis, BPH and Left THR with revisions, pancreatic cancer (dx 3/2021, chemo) s/p ERCP s/p whipple 3/2021 and 10/26/21, and Osteomyelitis of R hallux s/p amputation 9/202 and MSSA bacteremia, who was admitted from [facility] center with R 2nd toe chronic wound and infection. Previous hallux infection showed MSSA causing bacteremia and after surgery tissue culture was pseudomonas and staph Pettenkoferi (Oxacillin Sensitive).    Patient is being treated for right 2nd toe cellulitis, which appears to be improving. He is also being evaluated for osteomyelitis, bone scan pending. Wound culture growing citrobacter koseri. Patient remains afebrile and without leukocytosis (although leukopenia noted), and blood cultures currently no growth.    -continue Zosyn  -follow blood and wound cultures to completion  -follow up bone scan   -monitor WBC    Robyn Peñaloza MD  Division of infectious Diseases  Cell [phone] between 8am and 6pm  After 6pm and over the weekends please call ID service line at [phone].

## 2022-03-19 NOTE — PROGRESS NOTE ADULT - SUBJECTIVE AND OBJECTIVE BOX
[facility]  INFECTIOUS DISEASES - [address], [place] [address]  Tel: [phone]     Fax: [phone]  =======================================================    JUANIS DE SANTIAGO [id number]    Follow up: No fevers. Has mild right foot pain, but otherwise denies any new complaints.    Allergies:  Dilaudid (Anaphylaxis; Hives)      Antibiotics:  acetaminophen     Tablet .. 650 milliGRAM(s) Oral every 6 hours PRN  ALPRAZolam 0.25 milliGRAM(s) Oral at bedtime PRN  aluminum hydroxide/magnesium hydroxide/simethicone Suspension 30 milliLiter(s) Oral every 6 hours  aMIOdarone    Tablet 200 milliGRAM(s) Oral daily  brimonidine 0.2% Ophthalmic Solution 1 Drop(s) Both EYES two times a day  dextrose 40% Gel 15 Gram(s) Oral once  dextrose 5%. 1000 milliLiter(s) IV Continuous <Continuous>  dextrose 5%. 1000 milliLiter(s) IV Continuous <Continuous>  dextrose 50% Injectable 25 Gram(s) IV Push once  dextrose 50% Injectable 12.5 Gram(s) IV Push once  dextrose 50% Injectable 25 Gram(s) IV Push once  DULoxetine 60 milliGRAM(s) Oral daily  furosemide    Tablet 20 milliGRAM(s) Oral daily  glucagon  Injectable 1 milliGRAM(s) IntraMuscular once  insulin lispro (ADMELOG) corrective regimen sliding scale   SubCutaneous three times a day before meals  insulin lispro (ADMELOG) corrective regimen sliding scale   SubCutaneous at bedtime  latanoprost 0.005% Ophthalmic Solution 1 Drop(s) Both EYES at bedtime  metoprolol succinate ER 50 milliGRAM(s) Oral daily  pancrelipase  (CREON 36,000 Lipase Units) 3 Capsule(s) Oral three times a day with meals  pantoprazole    Tablet 40 milliGRAM(s) Oral before breakfast  piperacillin/tazobactam IVPB.. 3.375 Gram(s) IV Intermittent every 8 hours  rivaroxaban 20 milliGRAM(s) Oral with dinner  tamsulosin 0.4 milliGRAM(s) Oral at bedtime  timolol 0.5% Solution 1 Drop(s) Both EYES every 12 hours  traMADol 25 milliGRAM(s) Oral three times a day PRN  traMADol 25 milliGRAM(s) Oral at bedtime       REVIEW OF SYSTEMS:  CONSTITUTIONAL:  No Fever or chills  HEENT:   No sore throat or runny nose.  CARDIOVASCULAR:  No chest pain or SOB  RESPIRATORY:  No cough, shortness of breath  GASTROINTESTINAL:  No nausea, vomiting or diarrhea. no diarrhea  GENITOURINARY:  No dysuria  NEUROLOGIC:  No headache, no dizziness  PSYCHIATRIC:  No disorder of thought or mood.       Physical Exam:  ICU Vital Signs Last 24 Hrs  T(C): 36.4 (19 Mar 2022 12:50), Max: 36.6 (18 Mar 2022 21:00)  T(F): 97.5 (19 Mar 2022 12:50), Max: 97.8 (18 Mar 2022 21:00)  HR: 62 (19 Mar 2022 12:50) (62 - 78)  BP: 111/68 (19 Mar 2022 12:50) (111/68 - 113/71)  BP(mean): --  ABP: --  ABP(mean): --  RR: 16 (19 Mar 2022 12:50) (16 - 17)  SpO2: 98% (19 Mar 2022 12:50) (94% - 98%)     GEN: NAD  HEENT: normocephalic and atraumatic.   NECK: Supple.   LUNGS: Clear to auscultation.  HEART: Regular rate and rhythm   ABDOMEN: Soft, nontender, and nondistended.  EXTREMITIES: No leg edema.  MSK: no knee swelling  NEUROLOGIC: grossly intact.  PSYCHIATRIC: Appropriate affect .  SKIN: (+) ulcer on right 2nd toe, no active drainage, some swelling and mild erythema--improved per patient, foot swelling also improved    Labs:      140  |  106  |  10  ----------------------------<  126<H>  3.8   |  28  |  0.70    Ca    8.5      19 Mar 2022 09:01    TPro  7.9  /  Alb  3.6  /  TBili  0.7  /  DBili  x   /  AST  25  /  ALT  23  /  AlkPhos  121<H>                            9.4    2.93  )-----------( 145      ( 19 Mar 2022 09:01 )             29.2     PT/INR - ( 17 Mar 2022 17:54 )   PT: 21.7 sec;   INR: 1.84 ratio         PTT - ( 17 Mar 2022 17:54 )  PTT:37.0 sec  Urinalysis Basic - ( 17 Mar 2022 22:21 )    Color: Pale Yellow / Appearance: Clear / S.010 / pH: x  Gluc: x / Ketone: Negative  / Bili: Negative / Urobili: Negative   Blood: x / Protein: Negative / Nitrite: Negative   Leuk Esterase: Negative / RBC: x / WBC x   Sq Epi: x / Non Sq Epi: x / Bacteria: x      LIVER FUNCTIONS - ( 17 Mar 2022 17:54 )  Alb: 3.6 g/dL / Pro: 7.9 g/dL / ALK PHOS: 121 U/L / ALT: 23 U/L / AST: 25 U/L / GGT: x             RECENT CULTURES:   @ 23:28 .Other Right foot 2nd digit     Moderate Citrobacter koseri  Normal skin davonte isolated         @ 22:01 .Blood Blood-Peripheral     No growth to date.              All imaging and data are reviewed.

## 2022-03-19 NOTE — PROGRESS NOTE ADULT - ASSESSMENT
Right 2nd digit grade 3 ulcer    Current plan is to cont IV abx over the weekend. Pt will likely need right 2nd digit amputation next week- pending bone scan results.  - Requesting medical risk stratification and optimization.  - Requesting cardiology risk stratification and optimization.

## 2022-03-19 NOTE — PROGRESS NOTE ADULT - SUBJECTIVE AND OBJECTIVE BOX
SUBJECTIVE:    No acute events overnight, afebrile, hds.    VITAL SIGNS:    Vital Signs Last 24 Hrs  T(C): 36.4 (19 Mar 2022 05:17), Max: 36.6 (18 Mar 2022 21:00)  T(F): 97.6 (19 Mar 2022 05:17), Max: 97.8 (18 Mar 2022 21:00)  HR: 78 (19 Mar 2022 05:17) (77 - 80)  BP: 113/71 (19 Mar 2022 05:17) (97/61 - 113/71)  BP(mean): --  RR: 17 (19 Mar 2022 05:17) (17 - 17)  SpO2: 96% (19 Mar 2022 05:17) (94% - 96%)    PHYSICAL EXAM:     GENERAL: no acute distress  HEENT: NC/AT, EOMI, neck supple, MMM  RESPIRATORY: LCTAB/L, no rhonchi, rales, or wheezing  CARDIOVASCULAR: RRR, no murmurs, gallops, rubs  ABDOMINAL: soft, non-tender, non-distended, positive bowel sounds   EXTREMITIES: no clubbing, cyanosis, or edema  NEUROLOGICAL: alert and oriented x 3, non-focal  SKIN: no rashes or lesions   MUSCULOSKELETAL: no gross joint deformity                          8.3    3.01  )-----------( 135      ( 18 Mar 2022 08:54 )             25.9     03-18    140  |  108  |  13  ----------------------------<  129<H>  4.1   |  27  |  0.73    Ca    8.6      18 Mar 2022 08:54    TPro  7.9  /  Alb  3.6  /  TBili  0.7  /  DBili  x   /  AST  25  /  ALT  23  /  AlkPhos  121<H>  03-17      CAPILLARY BLOOD GLUCOSE      POCT Blood Glucose.: 142 mg/dL (19 Mar 2022 07:41)  POCT Blood Glucose.: 187 mg/dL (18 Mar 2022 21:19)  POCT Blood Glucose.: 133 mg/dL (18 Mar 2022 17:08)  POCT Blood Glucose.: 210 mg/dL (18 Mar 2022 11:33)      MEDICATIONS  (STANDING):  aluminum hydroxide/magnesium hydroxide/simethicone Suspension 30 milliLiter(s) Oral every 6 hours  aMIOdarone    Tablet 200 milliGRAM(s) Oral daily  brimonidine 0.2% Ophthalmic Solution 1 Drop(s) Both EYES two times a day  dextrose 40% Gel 15 Gram(s) Oral once  dextrose 5%. 1000 milliLiter(s) (50 mL/Hr) IV Continuous <Continuous>  dextrose 5%. 1000 milliLiter(s) (100 mL/Hr) IV Continuous <Continuous>  dextrose 50% Injectable 25 Gram(s) IV Push once  dextrose 50% Injectable 12.5 Gram(s) IV Push once  dextrose 50% Injectable 25 Gram(s) IV Push once  DULoxetine 60 milliGRAM(s) Oral daily  furosemide    Tablet 20 milliGRAM(s) Oral daily  glucagon  Injectable 1 milliGRAM(s) IntraMuscular once  insulin lispro (ADMELOG) corrective regimen sliding scale   SubCutaneous three times a day before meals  insulin lispro (ADMELOG) corrective regimen sliding scale   SubCutaneous at bedtime  latanoprost 0.005% Ophthalmic Solution 1 Drop(s) Both EYES at bedtime  metoprolol succinate ER 50 milliGRAM(s) Oral daily  pancrelipase  (CREON 36,000 Lipase Units) 3 Capsule(s) Oral three times a day with meals  pantoprazole    Tablet 40 milliGRAM(s) Oral before breakfast  piperacillin/tazobactam IVPB.. 3.375 Gram(s) IV Intermittent every 8 hours  rivaroxaban 20 milliGRAM(s) Oral with dinner  tamsulosin 0.4 milliGRAM(s) Oral at bedtime  timolol 0.5% Solution 1 Drop(s) Both EYES every 12 hours       SUBJECTIVE:    No acute events overnight, afebrile, hds.    VITAL SIGNS:    Vital Signs Last 24 Hrs  T(C): 36.4 (19 Mar 2022 05:17), Max: 36.6 (18 Mar 2022 21:00)  T(F): 97.6 (19 Mar 2022 05:17), Max: 97.8 (18 Mar 2022 21:00)  HR: 78 (19 Mar 2022 05:17) (77 - 80)  BP: 113/71 (19 Mar 2022 05:17) (97/61 - 113/71)  BP(mean): --  RR: 17 (19 Mar 2022 05:17) (17 - 17)  SpO2: 96% (19 Mar 2022 05:17) (94% - 96%)    PHYSICAL EXAM:     GENERAL: no acute distress  HEENT: NC/AT, EOMI, neck supple, MMM  RESPIRATORY: LCTAB/L, no rhonchi, rales, or wheezing  CARDIOVASCULAR: RRR, no murmurs, gallops, rubs  ABDOMINAL: soft, non-tender, non-distended, positive bowel sounds; open wound with serous discharge   EXTREMITIES: no clubbing, cyanosis, or edema; R foot s/p hallux amputation, 2nd toe is a hammer toe,   dorsal mid-digit ulcer with bloody purulent discharge and open wound that probes to bone  NEUROLOGICAL: alert and oriented x 3, non-focal  MUSCULOSKELETAL: no gross joint deformity                          8.3    3.01  )-----------( 135      ( 18 Mar 2022 08:54 )             25.9     03-18    140  |  108  |  13  ----------------------------<  129<H>  4.1   |  27  |  0.73    Ca    8.6      18 Mar 2022 08:54    TPro  7.9  /  Alb  3.6  /  TBili  0.7  /  DBili  x   /  AST  25  /  ALT  23  /  AlkPhos  121<H>  03-17      CAPILLARY BLOOD GLUCOSE      POCT Blood Glucose.: 142 mg/dL (19 Mar 2022 07:41)  POCT Blood Glucose.: 187 mg/dL (18 Mar 2022 21:19)  POCT Blood Glucose.: 133 mg/dL (18 Mar 2022 17:08)  POCT Blood Glucose.: 210 mg/dL (18 Mar 2022 11:33)      MEDICATIONS  (STANDING):  aluminum hydroxide/magnesium hydroxide/simethicone Suspension 30 milliLiter(s) Oral every 6 hours  aMIOdarone    Tablet 200 milliGRAM(s) Oral daily  brimonidine 0.2% Ophthalmic Solution 1 Drop(s) Both EYES two times a day  dextrose 40% Gel 15 Gram(s) Oral once  dextrose 5%. 1000 milliLiter(s) (50 mL/Hr) IV Continuous <Continuous>  dextrose 5%. 1000 milliLiter(s) (100 mL/Hr) IV Continuous <Continuous>  dextrose 50% Injectable 25 Gram(s) IV Push once  dextrose 50% Injectable 12.5 Gram(s) IV Push once  dextrose 50% Injectable 25 Gram(s) IV Push once  DULoxetine 60 milliGRAM(s) Oral daily  furosemide    Tablet 20 milliGRAM(s) Oral daily  glucagon  Injectable 1 milliGRAM(s) IntraMuscular once  insulin lispro (ADMELOG) corrective regimen sliding scale   SubCutaneous three times a day before meals  insulin lispro (ADMELOG) corrective regimen sliding scale   SubCutaneous at bedtime  latanoprost 0.005% Ophthalmic Solution 1 Drop(s) Both EYES at bedtime  metoprolol succinate ER 50 milliGRAM(s) Oral daily  pancrelipase  (CREON 36,000 Lipase Units) 3 Capsule(s) Oral three times a day with meals  pantoprazole    Tablet 40 milliGRAM(s) Oral before breakfast  piperacillin/tazobactam IVPB.. 3.375 Gram(s) IV Intermittent every 8 hours  rivaroxaban 20 milliGRAM(s) Oral with dinner  tamsulosin 0.4 milliGRAM(s) Oral at bedtime  timolol 0.5% Solution 1 Drop(s) Both EYES every 12 hours

## 2022-03-19 NOTE — PROGRESS NOTE ADULT - PROBLEM SELECTOR PLAN 1
Pt seen and evaluated.  WBC 2.93 today, ESR 56, CRP 15, HA1c 5.8.  Right foot XR results reviewed- There is mild hammertoe deformity but likely of the second toe. No active bone destruction or fracture evident. Findings similar to October 28, 2021.  Cleansed area and applied DSD to the right foot.  ~  Bone scan ordered to r/o osteomyelitis as pt cannot have an MRI (pacemaker)- will be done Mon 3/21/22.  Wound culture growing citrobacter koseri prelim- f/u final results.  ~  Per ID- Cont zosyn for now.  Pt can be weight bearing as tolerated in a surgical shoe and with a rolling walker.  Current plan is to cont IV abx over the weekend. Pt will likely need right 2nd digit amputation next week- pending bone scan results.  Requesting medical risk stratification and optimization.  Requesting cardiology risk stratification and optimization.  Pt is high risk for sepsis, limb loss and death.  Pt will be followed by Podiatry while in house.

## 2022-03-19 NOTE — PROGRESS NOTE ADULT - SUBJECTIVE AND OBJECTIVE BOX
HPI:  81 year old Male with PMHx of pancreatic cancer (dx 3/2021, chemo) s/p ERCP s/p whipple 3/2021 and 10/26/21, HTN, HLD, HFrEF (Oct 2021 EF: 35%), CAD s/p stents x2 (~15 years ago), Vtach with AICD (implanted 2005, extracted and re-implantation 9/2021 and 10/4/21), TAVR (4/2021), bilateral PE (7/2021) on Xarelto, spinal stenosis, macular degeneration, GERD, BPH, Left THR (x2 revisions), left femur fracture (hardware), osteomyelitis s/p R hallux amputation 9/2021, MSSA/VRE bacteremia seen bedside today for infected right 2nd digit ulcer. Denies any pain to the area and does not have any other pedal complaints. Denies any fever, chills, nausea, vomiting, chest pain, shortness of breath, or calf pain at this time.    PAST MEDICAL & SURGICAL HISTORY:  HTN (hypertension)    HLD (hyperlipidemia)    GERD (gastroesophageal reflux disease)    Cardiomyopathy    MSSA bacteremia  9/2021    Acute osteomyelitis    Pulmonary embolism    Pancreas cancer  chemo    [place] (hard of hearing)  B/L hearing aids    History of total hip replacement  left X 1, 2 revisions    History of laminectomy  X3    ICD (implantable cardiac defibrillator) in place  implanted 2005, replaced 10/4/2021 [employer] Subcutaneous ICD    Benign testicular tumor  radiation    Status post amputation of toe of right foot  8/2021    Status post fracture of femur  2017 left with hardware    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    H/O Whipple procedure  2/2021        Allergies    Dilaudid (Anaphylaxis; Hives)    MEDICATIONS  (STANDING):  aluminum hydroxide/magnesium hydroxide/simethicone Suspension 30 milliLiter(s) Oral every 6 hours  aMIOdarone    Tablet 200 milliGRAM(s) Oral daily  brimonidine 0.2% Ophthalmic Solution 1 Drop(s) Both EYES two times a day  dextrose 40% Gel 15 Gram(s) Oral once  dextrose 5%. 1000 milliLiter(s) (50 mL/Hr) IV Continuous <Continuous>  dextrose 5%. 1000 milliLiter(s) (100 mL/Hr) IV Continuous <Continuous>  dextrose 50% Injectable 25 Gram(s) IV Push once  dextrose 50% Injectable 12.5 Gram(s) IV Push once  dextrose 50% Injectable 25 Gram(s) IV Push once  DULoxetine 60 milliGRAM(s) Oral daily  furosemide    Tablet 20 milliGRAM(s) Oral daily  glucagon  Injectable 1 milliGRAM(s) IntraMuscular once  insulin lispro (ADMELOG) corrective regimen sliding scale   SubCutaneous three times a day before meals  insulin lispro (ADMELOG) corrective regimen sliding scale   SubCutaneous at bedtime  latanoprost 0.005% Ophthalmic Solution 1 Drop(s) Both EYES at bedtime  metoprolol succinate ER 50 milliGRAM(s) Oral daily  pancrelipase  (CREON 36,000 Lipase Units) 3 Capsule(s) Oral three times a day with meals  pantoprazole    Tablet 40 milliGRAM(s) Oral before breakfast  piperacillin/tazobactam IVPB.. 3.375 Gram(s) IV Intermittent every 8 hours  rivaroxaban 20 milliGRAM(s) Oral with dinner  tamsulosin 0.4 milliGRAM(s) Oral at bedtime  timolol 0.5% Solution 1 Drop(s) Both EYES every 12 hours  traMADol 25 milliGRAM(s) Oral at bedtime    MEDICATIONS  (PRN):  acetaminophen     Tablet .. 650 milliGRAM(s) Oral every 6 hours PRN Temp greater or equal to 38C (100.4F), Mild Pain (1 - 3)  ALPRAZolam 0.25 milliGRAM(s) Oral at bedtime PRN Insomnia  traMADol 25 milliGRAM(s) Oral three times a day PRN Moderate Pain (4 - 6)      FAMILY HISTORY:  Family history of stroke (Mother)    Vital Signs Last 24 Hrs  T(C): 36.4 (19 Mar 2022 12:50), Max: 36.6 (18 Mar 2022 21:00)  T(F): 97.5 (19 Mar 2022 12:50), Max: 97.8 (18 Mar 2022 21:00)  HR: 62 (19 Mar 2022 12:50) (62 - 78)  BP: 111/68 (19 Mar 2022 12:50) (111/68 - 113/71)  BP(mean): --  RR: 16 (19 Mar 2022 12:50) (16 - 17)  SpO2: 98% (19 Mar 2022 12:50) (94% - 98%)    PHYSICAL EXAM:  Vascular: DP palpable b/l, PT non-palpable b/l, Capillary refill 3 seconds to remaining digits, Digital hair absent bilaterally, right foot non-pitting edema.  Neurological: Light touch sensation diminished bilaterally.  Musculoskeletal: s/p right partial ray amputation. Hammered digits b/l. AJ & STJ ROM intact.  Dermatological: 1cm x 1.2cm x 0.2cm ulceration noted to the right dorsal digit, fibro:granular wound bed, (+) probe to bone, (+) periwound erythema, no malodor, proximal streaking extending to the midfoot, no fluctuance, serosanguinous drainage, no purulence.    CBC Full  -  ( 19 Mar 2022 09:01 )  WBC Count : 2.93 K/uL  RBC Count : 3.04 M/uL  Hemoglobin : 9.4 g/dL  Hematocrit : 29.2 %  Platelet Count - Automated : 145 K/uL  Mean Cell Volume : 96.1 fl  Mean Cell Hemoglobin : 30.9 pg  Mean Cell Hemoglobin Concentration : 32.2 gm/dL  Auto Neutrophil # : x  Auto Lymphocyte # : x  Auto Monocyte # : x  Auto Eosinophil # : x  Auto Basophil # : x  Auto Neutrophil % : x  Auto Lymphocyte % : x  Auto Monocyte % : x  Auto Eosinophil % : x  Auto Basophil % : x    03-19    140  |  106  |  10  ----------------------------<  126<H>  3.8   |  28  |  0.70    Ca    8.5      19 Mar 2022 09:01    TPro  7.9  /  Alb  3.6  /  TBili  0.7  /  DBili  x   /  AST  25  /  ALT  23  /  AlkPhos  121<H>  03-17 HPI:  81 year old Male with PMHx of pancreatic cancer (dx 3/2021, chemo) s/p ERCP s/p whipple 3/2021 and 10/26/21, HTN, HLD, HFrEF (Oct 2021 EF: 35%), CAD s/p stents x2 (~15 years ago), Vtach with AICD (implanted 2005, extracted and re-implantation 9/2021 and 10/4/21), TAVR (4/2021), bilateral PE (7/2021) on Xarelto, spinal stenosis, macular degeneration, GERD, BPH, Left THR (x2 revisions), left femur fracture (hardware), osteomyelitis s/p R hallux amputation 9/2021, MSSA/VRE bacteremia seen bedside today for infected right 2nd digit ulcer. Denies any pain to the area and does not have any other pedal complaints. Denies any fever, chills, nausea, vomiting, chest pain, shortness of breath, or calf pain at this time.    PAST MEDICAL & SURGICAL HISTORY:  HTN (hypertension)    HLD (hyperlipidemia)    GERD (gastroesophageal reflux disease)    Cardiomyopathy    MSSA bacteremia  9/2021    Acute osteomyelitis    Pulmonary embolism    Pancreas cancer  chemo    [place] (hard of hearing)  B/L hearing aids    History of total hip replacement  left X 1, 2 revisions    History of laminectomy  X3    ICD (implantable cardiac defibrillator) in place  implanted 2005, replaced 10/4/2021 [employer] Subcutaneous ICD    Benign testicular tumor  radiation    Status post amputation of toe of right foot  8/2021    Status post fracture of femur  2017 left with hardware    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    H/O Whipple procedure  2/2021        Allergies    Dilaudid (Anaphylaxis; Hives)    MEDICATIONS  (STANDING):  aluminum hydroxide/magnesium hydroxide/simethicone Suspension 30 milliLiter(s) Oral every 6 hours  aMIOdarone    Tablet 200 milliGRAM(s) Oral daily  brimonidine 0.2% Ophthalmic Solution 1 Drop(s) Both EYES two times a day  dextrose 40% Gel 15 Gram(s) Oral once  dextrose 5%. 1000 milliLiter(s) (50 mL/Hr) IV Continuous <Continuous>  dextrose 5%. 1000 milliLiter(s) (100 mL/Hr) IV Continuous <Continuous>  dextrose 50% Injectable 25 Gram(s) IV Push once  dextrose 50% Injectable 12.5 Gram(s) IV Push once  dextrose 50% Injectable 25 Gram(s) IV Push once  DULoxetine 60 milliGRAM(s) Oral daily  furosemide    Tablet 20 milliGRAM(s) Oral daily  glucagon  Injectable 1 milliGRAM(s) IntraMuscular once  insulin lispro (ADMELOG) corrective regimen sliding scale   SubCutaneous three times a day before meals  insulin lispro (ADMELOG) corrective regimen sliding scale   SubCutaneous at bedtime  latanoprost 0.005% Ophthalmic Solution 1 Drop(s) Both EYES at bedtime  metoprolol succinate ER 50 milliGRAM(s) Oral daily  pancrelipase  (CREON 36,000 Lipase Units) 3 Capsule(s) Oral three times a day with meals  pantoprazole    Tablet 40 milliGRAM(s) Oral before breakfast  piperacillin/tazobactam IVPB.. 3.375 Gram(s) IV Intermittent every 8 hours  rivaroxaban 20 milliGRAM(s) Oral with dinner  tamsulosin 0.4 milliGRAM(s) Oral at bedtime  timolol 0.5% Solution 1 Drop(s) Both EYES every 12 hours  traMADol 25 milliGRAM(s) Oral at bedtime    MEDICATIONS  (PRN):  acetaminophen     Tablet .. 650 milliGRAM(s) Oral every 6 hours PRN Temp greater or equal to 38C (100.4F), Mild Pain (1 - 3)  ALPRAZolam 0.25 milliGRAM(s) Oral at bedtime PRN Insomnia  traMADol 25 milliGRAM(s) Oral three times a day PRN Moderate Pain (4 - 6)      FAMILY HISTORY:  Family history of stroke (Mother)    Vital Signs Last 24 Hrs  T(C): 36.4 (19 Mar 2022 12:50), Max: 36.6 (18 Mar 2022 21:00)  T(F): 97.5 (19 Mar 2022 12:50), Max: 97.8 (18 Mar 2022 21:00)  HR: 62 (19 Mar 2022 12:50) (62 - 78)  BP: 111/68 (19 Mar 2022 12:50) (111/68 - 113/71)  BP(mean): --  RR: 16 (19 Mar 2022 12:50) (16 - 17)  SpO2: 98% (19 Mar 2022 12:50) (94% - 98%)    PHYSICAL EXAM:  Vascular: DP palpable b/l, PT non-palpable b/l, Capillary refill 3 seconds to remaining digits, Digital hair absent bilaterally, right foot non-pitting edema.  Neurological: Light touch sensation diminished bilaterally.  Musculoskeletal: s/p right partial ray amputation. Hammered digits b/l. AJ & STJ ROM intact.  Dermatological: 1cm x 1.2cm x 0.2cm ulceration noted to the right 2nd dorsal digit, fibro:granular wound bed, (+) probe to bone, (+) periwound erythema, no malodor, proximal streaking extending to the midfoot, no fluctuance, serosanguinous drainage, no purulence. Left 2nd digit dorsal PIPJ epithelialized lesion.    CBC Full  -  ( 19 Mar 2022 09:01 )  WBC Count : 2.93 K/uL  RBC Count : 3.04 M/uL  Hemoglobin : 9.4 g/dL  Hematocrit : 29.2 %  Platelet Count - Automated : 145 K/uL  Mean Cell Volume : 96.1 fl  Mean Cell Hemoglobin : 30.9 pg  Mean Cell Hemoglobin Concentration : 32.2 gm/dL  Auto Neutrophil # : x  Auto Lymphocyte # : x  Auto Monocyte # : x  Auto Eosinophil # : x  Auto Basophil # : x  Auto Neutrophil % : x  Auto Lymphocyte % : x  Auto Monocyte % : x  Auto Eosinophil % : x  Auto Basophil % : x    03-19    140  |  106  |  10  ----------------------------<  126<H>  3.8   |  28  |  0.70    Ca    8.5      19 Mar 2022 09:01    TPro  7.9  /  Alb  3.6  /  TBili  0.7  /  DBili  x   /  AST  25  /  ALT  23  /  AlkPhos  121<H>  03-17

## 2022-03-20 NOTE — PROGRESS NOTE ADULT - ASSESSMENT
80 year old male with PMH pancreatic cancer (dx 3/2021, chemo) s/p ERCP s/p whipple 3/2021 and 10/26/21, HTN, HLD, HFrEF (Oct 2021 EF: 35%), CAD s/p stents x2 (~15 years ago), Vtach with AICD (implanted 2005, extracted and re-implantation 9/2021 and 10/4/21), TAVR (4/2021), bilateral PE (7/2021) on Xarelto, spinal stenosis, macular degeneration, GERD, BPH, Left THR (x2 revisions), left femur fracture (hardware), Osteomyelitis s/p R hallux amputation 9/2021, MSSA/VRE bacteremia admitted for infected wound of RLE 2nd digit.      Problem/Plan - 1:  ·  Problem: Diabetic toe ulcer.   ·  Plan: Sent by wound care for ulceration of RLE 2nd digit  Sep 2021 episode of osteomyelitis of R hallux s/p amputation   Afebrile, no leukocytosis, Lactate wnl, elevated ESR  Care d/w ID, pt now off vanco and cefepime, changed to zosyn  Tylenol PRN for fevers  3/17 blood cx show ngtd, and other tissue cx shows normal skin davonte  Check bone scan - patient cannot have MRI due to AICD   RLE xray shows no bone destruction or fracture, s/p amputation of distal 1st metatarsal  WBAT  Surgical intervention pending results of above  Podiatry consulted, f/u recs  ID Dr Bell consulted, appreciate recs; as per d/w ID, wound care consult called, and recs reviewed  Vascular consulted, appreciate recs - no indication for surgical intervention at this time  Pain control with tylenol for mild pain, tramadol added for moderate pain, pt states it helps and would like to have it scheduled regularly at bedtime     Problem/Plan - 2:  ·  Problem: Chronic HFrEF (heart failure with reduced ejection fraction).   ·  Plan: Oct 2021 EF 35% with global LV dysfunction  Continue home lasix   Continue home metoprolol  CXR with some atelectasis, no PNA, consolidation, or effusions  Patient has AICD - not MRI compatible  apprec cardio consult and poss optimization.     Problem/Plan - 3:  ·  Problem: Pulmonary embolism.   ·  Plan: July 2021 b/l PEs  Continue home xarelto.     Problem/Plan - 4:  ·  Problem: T2DM (type 2 diabetes mellitus).   ·  Plan: HbA1c at 5.8, controlled  Start insulin corrective scale while inpatient  Routine glycemic monitoring.     Problem/Plan - 5:  ·  Problem: Ventricular tachyarrhythmia.   ·  Plan: Hx of Vtach during previous admission at [facility] for Whipple  Has AICD in place (reimplanted Oct 2021 due to bacteremia)  Continue home amiodarone.     Problem/Plan - 6:  ·  Problem: Pancreatic cancer.   ·  Plan: s/p Whipple x2, most recent Oct 2021  Continue home pancrelipase  Abdominal wound in midline, receives hyperbaric O2 therapy  Wound care consulted, appreciate recs  Some abdominal bloating - maalox started     Problem/Plan - 7:  ·  Problem: Macular degeneration.   ·  Plan: Continue home regimen: brimonidine, timolol, latonoprost.     Problem/Plan - 8:  ·  Problem: BPH (benign prostatic hyperplasia).   ·  Plan: Continue home flomax.     Problem/Plan - 9:  ·  Problem: GERD (gastroesophageal reflux disease).   ·  Plan: Continue home protonix.     Problem/Plan - 10:  ·  Problem: Need for prophylactic measure.   ·  Plan; DVT ppx: xarelto.   80 year old male with PMH pancreatic cancer (dx 3/2021, chemo) s/p ERCP s/p whipple 3/2021 and 10/26/21, HTN, HLD, HFrEF (Oct 2021 EF: 35%), CAD s/p stents x2 (~15 years ago), Vtach with AICD (implanted 2005, extracted and re-implantation 9/2021 and 10/4/21), TAVR (4/2021), bilateral PE (7/2021) on Xarelto, spinal stenosis, macular degeneration, GERD, BPH, Left THR (x2 revisions), left femur fracture (hardware), Osteomyelitis s/p R hallux amputation 9/2021, MSSA/VRE bacteremia admitted for infected wound of RLE 2nd digit.      Problem/Plan - 1:  ·  Problem: Diabetic toe ulcer.   ·  Plan: Sent by wound care for ulceration of RLE 2nd digit  Sep 2021 episode of osteomyelitis of R hallux s/p amputation   Afebrile, no leukocytosis, Lactate wnl, elevated ESR  Care d/w ID, pt now off vanco and cefepime, changed to zosyn; however, tissue culture results show klebsiella and enteroccocus with better sensitivities to bactrim, cipro, ceftriaxone, sensitive to zosyn but TYSON of 8, will d/x ID role of changing abx  Tylenol PRN for fevers  3/17 blood cx show ngtd  Check bone scan - patient cannot have MRI due to AICD   RLE xray shows no bone destruction or fracture, s/p amputation of distal 1st metatarsal  WBAT  Surgical intervention pending results of above  Podiatry consulted, f/u recs  ID Dr Bell consulted, appreciate recs; as per d/w ID, wound care consult called, and recs reviewed  Vascular consulted, appreciate recs - no indication for surgical intervention at this time  Pain control with tylenol for mild pain, tramadol added for moderate pain, pt states it helps and would like to have it scheduled regularly at bedtime     Problem/Plan - 2:  ·  Problem: Chronic HFrEF (heart failure with reduced ejection fraction).   ·  Plan: Oct 2021 EF 35% with global LV dysfunction  Continue home lasix   Continue home metoprolol  CXR with some atelectasis, no PNA, consolidation, or effusions  Patient has AICD - not MRI compatible  apprec cardio consult and poss optimization.     Problem/Plan - 3:  ·  Problem: Pulmonary embolism.   ·  Plan: July 2021 b/l PEs  Continue home xarelto.     Problem/Plan - 4:  ·  Problem: T2DM (type 2 diabetes mellitus).   ·  Plan: HbA1c at 5.8, controlled  Start insulin corrective scale while inpatient  Routine glycemic monitoring.     Problem/Plan - 5:  ·  Problem: Ventricular tachyarrhythmia.   ·  Plan: Hx of Vtach during previous admission at [facility] for Whipple  Has AICD in place (reimplanted Oct 2021 due to bacteremia)  Continue home amiodarone.     Problem/Plan - 6:  ·  Problem: Pancreatic cancer.   ·  Plan: s/p Whipple x2, most recent Oct 2021  Continue home pancrelipase  Abdominal wound in midline, receives hyperbaric O2 therapy  Wound care consulted, appreciate recs  Some abdominal bloating - maalox started     Problem/Plan - 7:  ·  Problem: Macular degeneration.   ·  Plan: Continue home regimen: brimonidine, timolol, latonoprost.     Problem/Plan - 8:  ·  Problem: BPH (benign prostatic hyperplasia).   ·  Plan: Continue home flomax.     Problem/Plan - 9:  ·  Problem: GERD (gastroesophageal reflux disease).   ·  Plan: Continue home protonix.     Problem/Plan - 10:  ·  Problem: Need for prophylactic measure.   ·  Plan; DVT ppx: xarelto.   80 year old male with PMH pancreatic cancer (dx 3/2021, chemo) s/p ERCP s/p whipple 3/2021 and 10/26/21, HTN, HLD, HFrEF (Oct 2021 EF: 35%), CAD s/p stents x2 (~15 years ago), Vtach with AICD (implanted 2005, extracted and re-implantation 9/2021 and 10/4/21), TAVR (4/2021), bilateral PE (7/2021) on Xarelto, spinal stenosis, macular degeneration, GERD, BPH, Left THR (x2 revisions), left femur fracture (hardware), Osteomyelitis s/p R hallux amputation 9/2021, MSSA/VRE bacteremia admitted for infected wound of RLE 2nd digit.      Problem/Plan - 1:  ·  Problem: Diabetic toe ulcer.   ·  Plan: Sent by wound care for ulceration of RLE 2nd digit  Sep 2021 episode of osteomyelitis of R hallux s/p amputation   Afebrile, no leukocytosis, Lactate wnl, elevated ESR  Care d/w ID, pt now off vanco and cefepime, changed to zosyn; however, tissue culture results show klebsiella and enteroccocus with better sensitivities to bactrim, cipro, ceftriaxone, sensitive to zosyn but TYSON of 8, will d/W ID role of changing abx  Tylenol PRN for fevers  3/17 blood cx show ngtd  Check bone scan - patient cannot have MRI due to AICD   RLE xray shows no bone destruction or fracture, s/p amputation of distal 1st metatarsal  WBAT  Surgical intervention pending results of above  Podiatry consulted, f/u recs  ID Dr Bell consulted, appreciate recs; as per d/w ID, wound care consult called, and recs reviewed  Vascular consulted, appreciate recs - no indication for surgical intervention at this time  Pain control with tylenol for mild pain, tramadol added for moderate pain, pt states it helps and would like to have it scheduled regularly at bedtime     Problem/Plan - 2:  ·  Problem: Chronic HFrEF (heart failure with reduced ejection fraction).   ·  Plan: Oct 2021 EF 35% with global LV dysfunction  Continue home lasix   Continue home metoprolol  CXR with some atelectasis, no PNA, consolidation, or effusions  Patient has AICD - not MRI compatible  apprec cardio consult and poss optimization.     Problem/Plan - 3:  ·  Problem: Pulmonary embolism.   ·  Plan: July 2021 b/l PEs  Continue home xarelto.     Problem/Plan - 4:  ·  Problem: T2DM (type 2 diabetes mellitus).   ·  Plan: HbA1c at 5.8, controlled  Start insulin corrective scale while inpatient  Routine glycemic monitoring.     Problem/Plan - 5:  ·  Problem: Ventricular tachyarrhythmia.   ·  Plan: Hx of Vtach during previous admission at [facility] for Whipple  Has AICD in place (reimplanted Oct 2021 due to bacteremia)  Continue home amiodarone.     Problem/Plan - 6:  ·  Problem: Pancreatic cancer.   ·  Plan: s/p Whipple x2, most recent Oct 2021  Continue home pancrelipase  Abdominal wound in midline, receives hyperbaric O2 therapy  Wound care consulted, appreciate recs  Some abdominal bloating - maalox started     Problem/Plan - 7:  ·  Problem: Macular degeneration.   ·  Plan: Continue home regimen: brimonidine, timolol, latonoprost.     Problem/Plan - 8:  ·  Problem: BPH (benign prostatic hyperplasia).   ·  Plan: Continue home flomax.     Problem/Plan - 9:  ·  Problem: GERD (gastroesophageal reflux disease).   ·  Plan: Continue home protonix.     Problem/Plan - 10:  ·  Problem: Need for prophylactic measure.   ·  Plan; DVT ppx: xarelto.

## 2022-03-20 NOTE — PROGRESS NOTE ADULT - SUBJECTIVE AND OBJECTIVE BOX
SUBJECTIVE:    No acute events overnight, afebrile, hds.    VITAL SIGNS:    Vital Signs Last 24 Hrs  T(C): 36.5 (20 Mar 2022 05:02), Max: 36.9 (19 Mar 2022 20:34)  T(F): 97.7 (20 Mar 2022 05:02), Max: 98.4 (19 Mar 2022 20:34)  HR: 72 (20 Mar 2022 05:02) (62 - 72)  BP: 115/66 (20 Mar 2022 05:02) (111/68 - 133/73)  BP(mean): --  RR: 16 (20 Mar 2022 05:02) (16 - 16)  SpO2: 93% (20 Mar 2022 05:02) (93% - 98%)    PHYSICAL EXAM:     GENERAL: no acute distress  HEENT: NC/AT, EOMI, neck supple, MMM  RESPIRATORY: LCTAB/L, no rhonchi, rales, or wheezing  CARDIOVASCULAR: RRR, no murmurs, gallops, rubs  ABDOMINAL: soft, non-tender, non-distended, positive bowel sounds   EXTREMITIES: no clubbing, cyanosis, or edema  NEUROLOGICAL: alert and oriented x 3, non-focal  SKIN: no rashes or lesions   MUSCULOSKELETAL: no gross joint deformity                          9.0    3.68  )-----------( 146      ( 20 Mar 2022 05:58 )             27.4     03-20    139  |  105  |  12  ----------------------------<  98  4.3   |  28  |  0.73    Ca    8.5      20 Mar 2022 05:58        CAPILLARY BLOOD GLUCOSE      POCT Blood Glucose.: 123 mg/dL (20 Mar 2022 07:48)  POCT Blood Glucose.: 138 mg/dL (19 Mar 2022 21:12)  POCT Blood Glucose.: 182 mg/dL (19 Mar 2022 16:32)  POCT Blood Glucose.: 159 mg/dL (19 Mar 2022 11:45)      MEDICATIONS  (STANDING):  aluminum hydroxide/magnesium hydroxide/simethicone Suspension 30 milliLiter(s) Oral every 6 hours  aMIOdarone    Tablet 200 milliGRAM(s) Oral daily  brimonidine 0.2% Ophthalmic Solution 1 Drop(s) Both EYES two times a day  dextrose 40% Gel 15 Gram(s) Oral once  dextrose 5%. 1000 milliLiter(s) (50 mL/Hr) IV Continuous <Continuous>  dextrose 5%. 1000 milliLiter(s) (100 mL/Hr) IV Continuous <Continuous>  dextrose 50% Injectable 25 Gram(s) IV Push once  dextrose 50% Injectable 12.5 Gram(s) IV Push once  dextrose 50% Injectable 25 Gram(s) IV Push once  DULoxetine 60 milliGRAM(s) Oral daily  furosemide    Tablet 20 milliGRAM(s) Oral daily  glucagon  Injectable 1 milliGRAM(s) IntraMuscular once  insulin lispro (ADMELOG) corrective regimen sliding scale   SubCutaneous three times a day before meals  insulin lispro (ADMELOG) corrective regimen sliding scale   SubCutaneous at bedtime  latanoprost 0.005% Ophthalmic Solution 1 Drop(s) Both EYES at bedtime  metoprolol succinate ER 50 milliGRAM(s) Oral daily  pancrelipase  (CREON 36,000 Lipase Units) 3 Capsule(s) Oral three times a day with meals  pantoprazole    Tablet 40 milliGRAM(s) Oral before breakfast  piperacillin/tazobactam IVPB.. 3.375 Gram(s) IV Intermittent every 8 hours  rivaroxaban 20 milliGRAM(s) Oral with dinner  tamsulosin 0.4 milliGRAM(s) Oral at bedtime  timolol 0.5% Solution 1 Drop(s) Both EYES every 12 hours  traMADol 25 milliGRAM(s) Oral at bedtime       SUBJECTIVE:    No acute events overnight, afebrile, hds.  Pt without any active cp, sob, n/v.    VITAL SIGNS:    Vital Signs Last 24 Hrs  T(C): 36.5 (20 Mar 2022 05:02), Max: 36.9 (19 Mar 2022 20:34)  T(F): 97.7 (20 Mar 2022 05:02), Max: 98.4 (19 Mar 2022 20:34)  HR: 72 (20 Mar 2022 05:02) (62 - 72)  BP: 115/66 (20 Mar 2022 05:02) (111/68 - 133/73)  BP(mean): --  RR: 16 (20 Mar 2022 05:02) (16 - 16)  SpO2: 93% (20 Mar 2022 05:02) (93% - 98%)    PHYSICAL EXAM:     GENERAL: no acute distress  HEENT: NC/AT, EOMI, neck supple, MMM  RESPIRATORY: LCTAB/L, no rhonchi, rales, or wheezing  CARDIOVASCULAR: RRR, no murmurs, gallops, rubs  ABDOMINAL: soft, non-tender, non-distended, positive bowel sounds; open wound with serous discharge   EXTREMITIES: no clubbing, cyanosis, or edema; R foot s/p hallux amputation, 2nd toe is a hammer toe,   dorsal mid-digit ulcer with bloody purulent discharge and open wound that probes to bone  NEUROLOGICAL: alert and oriented x 3, non-focal  MUSCULOSKELETAL: no gross joint deformity                          9.0    3.68  )-----------( 146      ( 20 Mar 2022 05:58 )             27.4     03-20    139  |  105  |  12  ----------------------------<  98  4.3   |  28  |  0.73    Ca    8.5      20 Mar 2022 05:58        CAPILLARY BLOOD GLUCOSE      POCT Blood Glucose.: 123 mg/dL (20 Mar 2022 07:48)  POCT Blood Glucose.: 138 mg/dL (19 Mar 2022 21:12)  POCT Blood Glucose.: 182 mg/dL (19 Mar 2022 16:32)  POCT Blood Glucose.: 159 mg/dL (19 Mar 2022 11:45)      MEDICATIONS  (STANDING):  aluminum hydroxide/magnesium hydroxide/simethicone Suspension 30 milliLiter(s) Oral every 6 hours  aMIOdarone    Tablet 200 milliGRAM(s) Oral daily  brimonidine 0.2% Ophthalmic Solution 1 Drop(s) Both EYES two times a day  dextrose 40% Gel 15 Gram(s) Oral once  dextrose 5%. 1000 milliLiter(s) (50 mL/Hr) IV Continuous <Continuous>  dextrose 5%. 1000 milliLiter(s) (100 mL/Hr) IV Continuous <Continuous>  dextrose 50% Injectable 25 Gram(s) IV Push once  dextrose 50% Injectable 12.5 Gram(s) IV Push once  dextrose 50% Injectable 25 Gram(s) IV Push once  DULoxetine 60 milliGRAM(s) Oral daily  furosemide    Tablet 20 milliGRAM(s) Oral daily  glucagon  Injectable 1 milliGRAM(s) IntraMuscular once  insulin lispro (ADMELOG) corrective regimen sliding scale   SubCutaneous three times a day before meals  insulin lispro (ADMELOG) corrective regimen sliding scale   SubCutaneous at bedtime  latanoprost 0.005% Ophthalmic Solution 1 Drop(s) Both EYES at bedtime  metoprolol succinate ER 50 milliGRAM(s) Oral daily  pancrelipase  (CREON 36,000 Lipase Units) 3 Capsule(s) Oral three times a day with meals  pantoprazole    Tablet 40 milliGRAM(s) Oral before breakfast  piperacillin/tazobactam IVPB.. 3.375 Gram(s) IV Intermittent every 8 hours  rivaroxaban 20 milliGRAM(s) Oral with dinner  tamsulosin 0.4 milliGRAM(s) Oral at bedtime  timolol 0.5% Solution 1 Drop(s) Both EYES every 12 hours  traMADol 25 milliGRAM(s) Oral at bedtime

## 2022-03-21 NOTE — PROGRESS NOTE ADULT - ASSESSMENT
Plan for R 2nd digit amputation this week- pending bone scan results.  - Requesting medical risk stratification and optimization.  - Requesting cardiology risk stratification and optimization.

## 2022-03-21 NOTE — PROGRESS NOTE ADULT - ASSESSMENT
80 year old male with PMH pancreatic cancer (dx 3/2021, chemo) s/p ERCP s/p whipple 3/2021 and 10/26/21, HTN, HLD, HFrEF (Oct 2021 EF: 35%), CAD s/p stents x2 (~15 years ago), Vtach with AICD (implanted 2005, extracted and re-implantation 9/2021 and 10/4/21), TAVR (4/2021), bilateral PE (7/2021) on Xarelto, spinal stenosis, macular degeneration, GERD, BPH, Left THR (x2 revisions), left femur fracture (hardware), Osteomyelitis s/p R hallux amputation 9/2021, MSSA/VRE bacteremia admitted for infected wound of RLE 2nd digit.      Problem/Plan - 1:  ·  Problem: Diabetic toe ulcer.   ·  Plan: Sent by wound care for ulceration of RLE 2nd digit  Sep 2021 episode of osteomyelitis of R hallux s/p amputation   Afebrile, no leukocytosis, Lactate wnl, elevated ESR  Care d/w ID, pt now off vanco and cefepime, changed to zosyn; care d/w ID, recommends for now to stay on zosyn  Tylenol PRN for fevers  3/17 blood cx show ngtd, tissue cx with klebsiella, citrobacter, and enterococcous  Check WBC bone scan - patient cannot have MRI due to AICD - WBC scan in progress, awaiting final results, and final ID recs  RLE xray shows no bone destruction or fracture, s/p amputation of distal 1st metatarsal  WBAT  Surgical intervention pending results of above  Podiatry consulted, f/u recs  ID Dr Bell consulted, appreciate recs; as per d/w ID, wound care consult called, and recs reviewed  Vascular consulted, appreciate recs - no indication for surgical intervention at this time  Pain control with tylenol for mild pain, tramadol added for moderate pain, pt states it helps and would like to have it scheduled regularly at bedtime     Problem/Plan - 2:  ·  Problem: Chronic HFrEF (heart failure with reduced ejection fraction).   ·  Plan: Oct 2021 EF 35% with global LV dysfunction  Continue home lasix   Continue home metoprolol  CXR with some atelectasis, no PNA, consolidation, or effusions  Patient has AICD - not MRI compatible  apprec cardio consult and poss optimization.     Problem/Plan - 3:  ·  Problem: Pulmonary embolism.   ·  Plan: July 2021 b/l PEs  Continue home xarelto.     Problem/Plan - 4:  ·  Problem: T2DM (type 2 diabetes mellitus).   ·  Plan: HbA1c at 5.8, controlled  Start insulin corrective scale while inpatient  Routine glycemic monitoring.     Problem/Plan - 5:  ·  Problem: Ventricular tachyarrhythmia.   ·  Plan: Hx of Vtach during previous admission at [facility] for Whipple  Has AICD in place (reimplanted Oct 2021 due to bacteremia)  Continue home amiodarone.     Problem/Plan - 6:  ·  Problem: Pancreatic cancer.   ·  Plan: s/p Whipple x2, most recent Oct 2021  Continue home pancrelipase  Abdominal wound in midline, receives hyperbaric O2 therapy  Wound care consulted, appreciate recs  Some abdominal bloating - maalox started     Problem/Plan - 7:  ·  Problem: Macular degeneration.   ·  Plan: Continue home regimen: brimonidine, timolol, latonoprost.     Problem/Plan - 8:  ·  Problem: BPH (benign prostatic hyperplasia).   ·  Plan: Continue home flomax.     Problem/Plan - 9:  ·  Problem: GERD (gastroesophageal reflux disease).   ·  Plan: Continue home protonix.     Problem/Plan - 10:  ·  Problem: Need for prophylactic measure.   ·  Plan; DVT ppx: xarelto.

## 2022-03-21 NOTE — CONSULT NOTE ADULT - ASSESSMENT
Physical Exam:   Vital Signs Last 24 Hrs  T(C): 36.6 (21 Mar 2022 11:50), Max: 36.8 (20 Mar 2022 21:22)  T(F): 97.8 (21 Mar 2022 11:50), Max: 98.2 (20 Mar 2022 21:22)  HR: 61 (21 Mar 2022 11:50) (61 - 70)  BP: 106/63 (21 Mar 2022 11:50) (99/56 - 115/69)  BP(mean): --  RR: 17 (21 Mar 2022 11:50) (17 - 17)  SpO2: 98% (21 Mar 2022 11:50) (93% - 98%)    General: NAD, denies Fever, chills  CVS: S1S2 no M/R/G  Resp: CTA in all fields           CAPILLARY BLOOD GLUCOSE      POCT Blood Glucose.: 152 mg/dL (21 Mar 2022 11:19)  POCT Blood Glucose.: 116 mg/dL (21 Mar 2022 07:44)  POCT Blood Glucose.: 136 mg/dL (20 Mar 2022 22:02)  POCT Blood Glucose.: 172 mg/dL (20 Mar 2022 17:16)      Cholesterol, Serum: 113 mg/dL (05.19.21 @ 08:36)     HDL Cholesterol, Serum: 22 mg/dL (05.19.21 @ 08:36)     LDL Cholesterol Calculated: 66 mg/dL (05.19.21 @ 08:36)     DIET: CC  >50%

## 2022-03-21 NOTE — PROGRESS NOTE ADULT - SUBJECTIVE AND OBJECTIVE BOX
HPI:  81 year old Male with PMHx of pancreatic cancer (dx 3/2021, chemo) s/p ERCP s/p whipple 3/2021 and 10/26/21, HTN, HLD, HFrEF (Oct 2021 EF: 35%), CAD s/p stents x2 (~15 years ago), Vtach with AICD (implanted 2005, extracted and re-implantation 9/2021 and 10/4/21), TAVR (4/2021), bilateral PE (7/2021) on Xarelto, spinal stenosis, macular degeneration, GERD, BPH, Left THR (x2 revisions), left femur fracture (hardware), osteomyelitis s/p R hallux amputation 9/2021, MSSA/VRE bacteremia seen bedside today for infected right 2nd digit ulcer. Denies any pain to the area and does not have any other pedal complaints. Denies any fever, chills, nausea, vomiting, chest pain, shortness of breath, or calf pain at this time.    PAST MEDICAL & SURGICAL HISTORY:  HTN (hypertension)    HLD (hyperlipidemia)    GERD (gastroesophageal reflux disease)    Cardiomyopathy    MSSA bacteremia  9/2021    Acute osteomyelitis    Pulmonary embolism    Pancreas cancer  chemo    [place] (hard of hearing)  B/L hearing aids    History of total hip replacement  left X 1, 2 revisions    History of laminectomy  X3    ICD (implantable cardiac defibrillator) in place  implanted 2005, replaced 10/4/2021 [employer] Subcutaneous ICD    Benign testicular tumor  radiation    Status post amputation of toe of right foot  8/2021    Status post fracture of femur  2017 left with hardware    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    H/O Whipple procedure  2/2021        Allergies    Dilaudid (Anaphylaxis; Hives)    MEDICATIONS  (STANDING):  aluminum hydroxide/magnesium hydroxide/simethicone Suspension 30 milliLiter(s) Oral every 6 hours  aMIOdarone    Tablet 200 milliGRAM(s) Oral daily  brimonidine 0.2% Ophthalmic Solution 1 Drop(s) Both EYES two times a day  dextrose 40% Gel 15 Gram(s) Oral once  dextrose 5%. 1000 milliLiter(s) (50 mL/Hr) IV Continuous <Continuous>  dextrose 5%. 1000 milliLiter(s) (100 mL/Hr) IV Continuous <Continuous>  dextrose 50% Injectable 25 Gram(s) IV Push once  dextrose 50% Injectable 12.5 Gram(s) IV Push once  dextrose 50% Injectable 25 Gram(s) IV Push once  DULoxetine 60 milliGRAM(s) Oral daily  furosemide    Tablet 20 milliGRAM(s) Oral daily  glucagon  Injectable 1 milliGRAM(s) IntraMuscular once  insulin lispro (ADMELOG) corrective regimen sliding scale   SubCutaneous three times a day before meals  insulin lispro (ADMELOG) corrective regimen sliding scale   SubCutaneous at bedtime  latanoprost 0.005% Ophthalmic Solution 1 Drop(s) Both EYES at bedtime  metoprolol succinate ER 50 milliGRAM(s) Oral daily  pancrelipase  (CREON 36,000 Lipase Units) 3 Capsule(s) Oral three times a day with meals  pantoprazole    Tablet 40 milliGRAM(s) Oral before breakfast  piperacillin/tazobactam IVPB.. 3.375 Gram(s) IV Intermittent every 8 hours  rivaroxaban 20 milliGRAM(s) Oral with dinner  tamsulosin 0.4 milliGRAM(s) Oral at bedtime  timolol 0.5% Solution 1 Drop(s) Both EYES every 12 hours  traMADol 25 milliGRAM(s) Oral at bedtime    MEDICATIONS  (PRN):  acetaminophen     Tablet .. 650 milliGRAM(s) Oral every 6 hours PRN Temp greater or equal to 38C (100.4F), Mild Pain (1 - 3)  ALPRAZolam 0.25 milliGRAM(s) Oral at bedtime PRN Insomnia  traMADol 25 milliGRAM(s) Oral three times a day PRN Moderate Pain (4 - 6)      FAMILY HISTORY:  Family history of stroke (Mother)    Vital Signs Last 24 Hrs  T(C): 36.4 (19 Mar 2022 12:50), Max: 36.6 (18 Mar 2022 21:00)  T(F): 97.5 (19 Mar 2022 12:50), Max: 97.8 (18 Mar 2022 21:00)  HR: 62 (19 Mar 2022 12:50) (62 - 78)  BP: 111/68 (19 Mar 2022 12:50) (111/68 - 113/71)  BP(mean): --  RR: 16 (19 Mar 2022 12:50) (16 - 17)  SpO2: 98% (19 Mar 2022 12:50) (94% - 98%)    PHYSICAL EXAM:  Vascular: DP palpable b/l, PT non-palpable b/l, Capillary refill 3 seconds to remaining digits, Digital hair absent bilaterally, right foot non-pitting edema.  Neurological: Light touch sensation diminished bilaterally.  Musculoskeletal: s/p right partial ray amputation. Hammered digits b/l. AJ & STJ ROM intact.  Dermatological: 1cm x 1.2cm x 0.2cm ulceration noted to the right dorsal digit, fibro:granular wound bed, (+) probe to bone, (+) periwound erythema, no malodor, proximal streaking extending to the midfoot, no fluctuance, serosanguinous drainage, no purulence.    CBC Full  -  ( 19 Mar 2022 09:01 )  WBC Count : 2.93 K/uL  RBC Count : 3.04 M/uL  Hemoglobin : 9.4 g/dL  Hematocrit : 29.2 %  Platelet Count - Automated : 145 K/uL  Mean Cell Volume : 96.1 fl  Mean Cell Hemoglobin : 30.9 pg  Mean Cell Hemoglobin Concentration : 32.2 gm/dL  Auto Neutrophil # : x  Auto Lymphocyte # : x  Auto Monocyte # : x  Auto Eosinophil # : x  Auto Basophil # : x  Auto Neutrophil % : x  Auto Lymphocyte % : x  Auto Monocyte % : x  Auto Eosinophil % : x  Auto Basophil % : x    03-19    140  |  106  |  10  ----------------------------<  126<H>  3.8   |  28  |  0.70    Ca    8.5      19 Mar 2022 09:01    TPro  7.9  /  Alb  3.6  /  TBili  0.7  /  DBili  x   /  AST  25  /  ALT  23  /  AlkPhos  121<H>  03-17 HPI:  81 year old Male with PMHx of pancreatic cancer (dx 3/2021, chemo) s/p ERCP s/p whipple 3/2021 and 10/26/21, HTN, HLD, HFrEF (Oct 2021 EF: 35%), CAD s/p stents x2 (~15 years ago), Vtach with AICD (implanted 2005, extracted and re-implantation 9/2021 and 10/4/21), TAVR (4/2021), bilateral PE (7/2021) on Xarelto, spinal stenosis, macular degeneration, GERD, BPH, Left THR (x2 revisions), left femur fracture (hardware), osteomyelitis s/p R hallux amputation 9/2021, MSSA/VRE bacteremia seen bedside today for infected right 2nd digit ulcer. Denies any pain to the area and does not have any other pedal complaints. Denies any fever, chills, nausea, vomiting, chest pain, shortness of breath, or calf pain at this time.    PAST MEDICAL & SURGICAL HISTORY:  HTN (hypertension)    HLD (hyperlipidemia)    GERD (gastroesophageal reflux disease)    Cardiomyopathy    MSSA bacteremia  9/2021    Acute osteomyelitis    Pulmonary embolism    Pancreas cancer  chemo    [place] (hard of hearing)  B/L hearing aids    History of total hip replacement  left X 1, 2 revisions    History of laminectomy  X3    ICD (implantable cardiac defibrillator) in place  implanted 2005, replaced 10/4/2021 [place] [employer] Subcutaneous ICD    Benign testicular tumor  radiation    Status post amputation of toe of right foot  8/2021    Status post fracture of femur  2017 left with hardware    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    H/O Whipple procedure  2/2021    Allergies    Dilaudid (Anaphylaxis; Hives)    FAMILY HISTORY:  Family history of stroke (Mother)    MEDICATIONS  (STANDING):  aluminum hydroxide/magnesium hydroxide/simethicone Suspension 30 milliLiter(s) Oral every 6 hours  aMIOdarone    Tablet 200 milliGRAM(s) Oral daily  brimonidine 0.2% Ophthalmic Solution 1 Drop(s) Both EYES two times a day  dextrose 40% Gel 15 Gram(s) Oral once  dextrose 5%. 1000 milliLiter(s) (50 mL/Hr) IV Continuous <Continuous>  dextrose 5%. 1000 milliLiter(s) (100 mL/Hr) IV Continuous <Continuous>  dextrose 50% Injectable 25 Gram(s) IV Push once  dextrose 50% Injectable 12.5 Gram(s) IV Push once  dextrose 50% Injectable 25 Gram(s) IV Push once  DULoxetine 60 milliGRAM(s) Oral daily  furosemide    Tablet 20 milliGRAM(s) Oral daily  glucagon  Injectable 1 milliGRAM(s) IntraMuscular once  insulin lispro (ADMELOG) corrective regimen sliding scale   SubCutaneous three times a day before meals  insulin lispro (ADMELOG) corrective regimen sliding scale   SubCutaneous at bedtime  latanoprost 0.005% Ophthalmic Solution 1 Drop(s) Both EYES at bedtime  metoprolol succinate ER 50 milliGRAM(s) Oral daily  pancrelipase  (CREON 36,000 Lipase Units) 3 Capsule(s) Oral three times a day with meals  pantoprazole    Tablet 40 milliGRAM(s) Oral before breakfast  piperacillin/tazobactam IVPB.. 3.375 Gram(s) IV Intermittent every 8 hours  rivaroxaban 20 milliGRAM(s) Oral with dinner  tamsulosin 0.4 milliGRAM(s) Oral at bedtime  timolol 0.5% Solution 1 Drop(s) Both EYES every 12 hours  traMADol 25 milliGRAM(s) Oral at bedtime    MEDICATIONS  (PRN):  acetaminophen     Tablet .. 650 milliGRAM(s) Oral every 6 hours PRN Temp greater or equal to 38C (100.4F), Mild Pain (1 - 3)  ALPRAZolam 0.25 milliGRAM(s) Oral at bedtime PRN Insomnia  ondansetron Injectable 4 milliGRAM(s) IV Push every 6 hours PRN Nausea and/or Vomiting  traMADol 25 milliGRAM(s) Oral three times a day PRN Moderate Pain (4 - 6)    Vital Signs Last 24 Hrs  T(C): 36.6 (21 Mar 2022 11:50), Max: 36.8 (20 Mar 2022 21:22)  T(F): 97.8 (21 Mar 2022 11:50), Max: 98.2 (20 Mar 2022 21:22)  HR: 61 (21 Mar 2022 11:50) (61 - 70)  BP: 106/63 (21 Mar 2022 11:50) (99/56 - 115/69)  BP(mean): --  RR: 17 (21 Mar 2022 11:50) (17 - 17)  SpO2: 98% (21 Mar 2022 11:50) (93% - 98%)    PHYSICAL EXAM:  Vascular: DP palpable b/l, PT non-palpable b/l, Capillary refill 3 seconds to remaining digits, Digital hair absent bilaterally, right forefoot non-pitting edema.  Neurological: Light touch sensation diminished bilaterally.  Musculoskeletal: s/p right partial ray amputation. Hammered digits b/l. AJ & STJ ROM intact.  Dermatological: 1cm x 1.2cm x 0.2cm ulceration noted to the right dorsal digit, fibro:granular wound bed, (+) probe to bone, (+) periwound erythema, no malodor, proximal streaking has resolved, no fluctuance, serosanguinous drainage, no purulence.    CBC Full  -  ( 21 Mar 2022 06:52 )  WBC Count : 4.06 K/uL  RBC Count : 2.69 M/uL  Hemoglobin : 8.4 g/dL  Hematocrit : 25.9 %  Platelet Count - Automated : 145 K/uL  Mean Cell Volume : 96.3 fl  Mean Cell Hemoglobin : 31.2 pg  Mean Cell Hemoglobin Concentration : 32.4 gm/dL  Auto Neutrophil # : x  Auto Lymphocyte # : x  Auto Monocyte # : x  Auto Eosinophil # : x  Auto Basophil # : x  Auto Neutrophil % : x  Auto Lymphocyte % : x  Auto Monocyte % : x  Auto Eosinophil % : x  Auto Basophil % : x    03-21    138  |  105  |  13  ----------------------------<  106<H>  4.1   |  28  |  0.71    Ca    8.5      21 Mar 2022 06:52 HPI:  81 year old Male with PMHx of pancreatic cancer (dx 3/2021, chemo) s/p ERCP s/p whipple 3/2021 and 10/26/21, HTN, HLD, HFrEF (Oct 2021 EF: 35%), CAD s/p stents x2 (~15 years ago), Vtach with AICD (implanted 2005, extracted and re-implantation 9/2021 and 10/4/21), TAVR (4/2021), bilateral PE (7/2021) on Xarelto, spinal stenosis, macular degeneration, GERD, BPH, Left THR (x2 revisions), left femur fracture (hardware), osteomyelitis s/p R hallux amputation 9/2021, MSSA/VRE bacteremia seen bedside today for infected right 2nd digit ulcer. Denies any pain to the area and does not have any other pedal complaints. Denies any fever, chills, nausea, vomiting, chest pain, shortness of breath, or calf pain at this time.    PAST MEDICAL & SURGICAL HISTORY:  HTN (hypertension)    HLD (hyperlipidemia)    GERD (gastroesophageal reflux disease)    Cardiomyopathy    MSSA bacteremia  9/2021    Acute osteomyelitis    Pulmonary embolism    Pancreas cancer  chemo    [place] (hard of hearing)  B/L hearing aids    History of total hip replacement  left X 1, 2 revisions    History of laminectomy  X3    ICD (implantable cardiac defibrillator) in place  implanted 2005, replaced 10/4/2021 [employer] Subcutaneous ICD    Benign testicular tumor  radiation    Status post amputation of toe of right foot  8/2021    Status post fracture of femur  2017 left with hardware    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    H/O Whipple procedure  2/2021    Allergies    Dilaudid (Anaphylaxis; Hives)    FAMILY HISTORY:  Family history of stroke (Mother)    MEDICATIONS  (STANDING):  aluminum hydroxide/magnesium hydroxide/simethicone Suspension 30 milliLiter(s) Oral every 6 hours  aMIOdarone    Tablet 200 milliGRAM(s) Oral daily  brimonidine 0.2% Ophthalmic Solution 1 Drop(s) Both EYES two times a day  dextrose 40% Gel 15 Gram(s) Oral once  dextrose 5%. 1000 milliLiter(s) (50 mL/Hr) IV Continuous <Continuous>  dextrose 5%. 1000 milliLiter(s) (100 mL/Hr) IV Continuous <Continuous>  dextrose 50% Injectable 25 Gram(s) IV Push once  dextrose 50% Injectable 12.5 Gram(s) IV Push once  dextrose 50% Injectable 25 Gram(s) IV Push once  DULoxetine 60 milliGRAM(s) Oral daily  furosemide    Tablet 20 milliGRAM(s) Oral daily  glucagon  Injectable 1 milliGRAM(s) IntraMuscular once  insulin lispro (ADMELOG) corrective regimen sliding scale   SubCutaneous three times a day before meals  insulin lispro (ADMELOG) corrective regimen sliding scale   SubCutaneous at bedtime  latanoprost 0.005% Ophthalmic Solution 1 Drop(s) Both EYES at bedtime  metoprolol succinate ER 50 milliGRAM(s) Oral daily  pancrelipase  (CREON 36,000 Lipase Units) 3 Capsule(s) Oral three times a day with meals  pantoprazole    Tablet 40 milliGRAM(s) Oral before breakfast  piperacillin/tazobactam IVPB.. 3.375 Gram(s) IV Intermittent every 8 hours  rivaroxaban 20 milliGRAM(s) Oral with dinner  tamsulosin 0.4 milliGRAM(s) Oral at bedtime  timolol 0.5% Solution 1 Drop(s) Both EYES every 12 hours  traMADol 25 milliGRAM(s) Oral at bedtime    MEDICATIONS  (PRN):  acetaminophen     Tablet .. 650 milliGRAM(s) Oral every 6 hours PRN Temp greater or equal to 38C (100.4F), Mild Pain (1 - 3)  ALPRAZolam 0.25 milliGRAM(s) Oral at bedtime PRN Insomnia  ondansetron Injectable 4 milliGRAM(s) IV Push every 6 hours PRN Nausea and/or Vomiting  traMADol 25 milliGRAM(s) Oral three times a day PRN Moderate Pain (4 - 6)    Vital Signs Last 24 Hrs  T(C): 36.6 (21 Mar 2022 11:50), Max: 36.8 (20 Mar 2022 21:22)  T(F): 97.8 (21 Mar 2022 11:50), Max: 98.2 (20 Mar 2022 21:22)  HR: 61 (21 Mar 2022 11:50) (61 - 70)  BP: 106/63 (21 Mar 2022 11:50) (99/56 - 115/69)  BP(mean): --  RR: 17 (21 Mar 2022 11:50) (17 - 17)  SpO2: 98% (21 Mar 2022 11:50) (93% - 98%)    PHYSICAL EXAM:  Vascular: DP palpable b/l, PT non-palpable b/l, Capillary refill 3 seconds to remaining digits, Digital hair absent bilaterally, right forefoot non-pitting edema.  Neurological: Light touch sensation diminished bilaterally.  Musculoskeletal: s/p right partial ray amputation. Hammered digits b/l. AJ & STJ ROM intact.  Dermatological: 1cm x 1.2cm x 0.2cm ulceration noted to the right 2nd digit dorsal PIPJ, fibro:granular wound bed, (+) probe to bone, (+) periwound erythema, no malodor, proximal streaking has resolved, no fluctuance, serosanguinous drainage, no purulence. Epithelialized lesion on left 2nd digit dorsal PIPJ.    CBC Full  -  ( 21 Mar 2022 06:52 )  WBC Count : 4.06 K/uL  RBC Count : 2.69 M/uL  Hemoglobin : 8.4 g/dL  Hematocrit : 25.9 %  Platelet Count - Automated : 145 K/uL  Mean Cell Volume : 96.3 fl  Mean Cell Hemoglobin : 31.2 pg  Mean Cell Hemoglobin Concentration : 32.4 gm/dL  Auto Neutrophil # : x  Auto Lymphocyte # : x  Auto Monocyte # : x  Auto Eosinophil # : x  Auto Basophil # : x  Auto Neutrophil % : x  Auto Lymphocyte % : x  Auto Monocyte % : x  Auto Eosinophil % : x  Auto Basophil % : x    03-21    138  |  105  |  13  ----------------------------<  106<H>  4.1   |  28  |  0.71    Ca    8.5      21 Mar 2022 06:52

## 2022-03-21 NOTE — PROGRESS NOTE ADULT - PROBLEM SELECTOR PLAN 1
Pt seen and evaluated.  WBC 4.06 today, ESR 56, CRP 15, HA1c 5.8.  Right foot XR results reviewed- There is mild hammertoe deformity but likely of the second toe. No active bone destruction or fracture evident. Findings similar to October 28, 2021.  Cleansed area and applied DSD to the right foot.  ~  Bone scan ordered to r/o osteomyelitis as pt cannot have an MRI (pacemaker)- will be done Mon 3/21/22.  R foot wound culture- citrobacter, klebsiella and e. faecalis.  ~  Per ID- Cont [email or web address] for now.  Pt can be weight bearing as tolerated in a surgical shoe and with a rolling walker.  Plan for R 2nd digit amputation this week- pending bone scan results.  Requesting medical risk stratification and optimization.  Requesting cardiology risk stratification and optimization.  Pt is high risk for sepsis, limb loss and death.  Pt will be followed by Podiatry while in house.

## 2022-03-21 NOTE — PROGRESS NOTE ADULT - SUBJECTIVE AND OBJECTIVE BOX
SUBJECTIVE:    No acute events overnight, afebrile, hds.    VITAL SIGNS:    Vital Signs Last 24 Hrs  T(C): 36.6 (21 Mar 2022 11:50), Max: 36.8 (20 Mar 2022 21:22)  T(F): 97.8 (21 Mar 2022 11:50), Max: 98.2 (20 Mar 2022 21:22)  HR: 61 (21 Mar 2022 11:50) (61 - 70)  BP: 106/63 (21 Mar 2022 11:50) (99/56 - 115/69)  BP(mean): --  RR: 17 (21 Mar 2022 11:50) (17 - 17)  SpO2: 98% (21 Mar 2022 11:50) (93% - 98%)    PHYSICAL EXAM:     GENERAL: no acute distress  HEENT: NC/AT, EOMI, neck supple, MMM  RESPIRATORY: LCTAB/L, no rhonchi, rales, or wheezing  CARDIOVASCULAR: RRR, no murmurs, gallops, rubs  ABDOMINAL: soft, non-tender, non-distended, positive bowel sounds   EXTREMITIES: no clubbing, cyanosis, or edema  NEUROLOGICAL: alert and oriented x 3, non-focal  SKIN: no rashes or lesions   MUSCULOSKELETAL: no gross joint deformity                          8.4    4.06  )-----------( 145      ( 21 Mar 2022 06:52 )             25.9     03-21    138  |  105  |  13  ----------------------------<  106<H>  4.1   |  28  |  0.71    Ca    8.5      21 Mar 2022 06:52        CAPILLARY BLOOD GLUCOSE      POCT Blood Glucose.: 111 mg/dL (21 Mar 2022 16:44)  POCT Blood Glucose.: 152 mg/dL (21 Mar 2022 11:19)  POCT Blood Glucose.: 116 mg/dL (21 Mar 2022 07:44)  POCT Blood Glucose.: 136 mg/dL (20 Mar 2022 22:02)      MEDICATIONS  (STANDING):  aluminum hydroxide/magnesium hydroxide/simethicone Suspension 30 milliLiter(s) Oral every 6 hours  aMIOdarone    Tablet 200 milliGRAM(s) Oral daily  brimonidine 0.2% Ophthalmic Solution 1 Drop(s) Both EYES two times a day  dextrose 40% Gel 15 Gram(s) Oral once  dextrose 5%. 1000 milliLiter(s) (50 mL/Hr) IV Continuous <Continuous>  dextrose 5%. 1000 milliLiter(s) (100 mL/Hr) IV Continuous <Continuous>  dextrose 50% Injectable 25 Gram(s) IV Push once  dextrose 50% Injectable 12.5 Gram(s) IV Push once  dextrose 50% Injectable 25 Gram(s) IV Push once  DULoxetine 60 milliGRAM(s) Oral daily  furosemide    Tablet 20 milliGRAM(s) Oral daily  glucagon  Injectable 1 milliGRAM(s) IntraMuscular once  insulin lispro (ADMELOG) corrective regimen sliding scale   SubCutaneous three times a day before meals  insulin lispro (ADMELOG) corrective regimen sliding scale   SubCutaneous at bedtime  latanoprost 0.005% Ophthalmic Solution 1 Drop(s) Both EYES at bedtime  metoprolol succinate ER 50 milliGRAM(s) Oral daily  pancrelipase  (CREON 36,000 Lipase Units) 3 Capsule(s) Oral three times a day with meals  pantoprazole    Tablet 40 milliGRAM(s) Oral before breakfast  piperacillin/tazobactam IVPB.. 3.375 Gram(s) IV Intermittent every 8 hours  rivaroxaban 20 milliGRAM(s) Oral with dinner  tamsulosin 0.4 milliGRAM(s) Oral at bedtime  timolol 0.5% Solution 1 Drop(s) Both EYES every 12 hours  traMADol 25 milliGRAM(s) Oral at bedtime       SUBJECTIVE:    No acute events overnight, afebrile, hds.  Pt feels well, no reports of pain.    VITAL SIGNS:    Vital Signs Last 24 Hrs  T(C): 36.6 (21 Mar 2022 11:50), Max: 36.8 (20 Mar 2022 21:22)  T(F): 97.8 (21 Mar 2022 11:50), Max: 98.2 (20 Mar 2022 21:22)  HR: 61 (21 Mar 2022 11:50) (61 - 70)  BP: 106/63 (21 Mar 2022 11:50) (99/56 - 115/69)  BP(mean): --  RR: 17 (21 Mar 2022 11:50) (17 - 17)  SpO2: 98% (21 Mar 2022 11:50) (93% - 98%)    PHYSICAL EXAM:     GENERAL: no acute distress  HEENT: NC/AT, EOMI, neck supple, MMM  RESPIRATORY: LCTAB/L, no rhonchi, rales, or wheezing  CARDIOVASCULAR: RRR, no murmurs, gallops, rubs  ABDOMINAL: soft, non-tender, non-distended, positive bowel sounds; open wound with serous discharge   EXTREMITIES: no clubbing, cyanosis, or edema; R foot s/p hallux amputation, 2nd toe is a hammer toe,   dorsal mid-digit ulcer with bloody purulent discharge and open wound that probes to bone  NEUROLOGICAL: alert and oriented x 3, non-focal  MUSCULOSKELETAL: no gross joint deformity                          8.4    4.06  )-----------( 145      ( 21 Mar 2022 06:52 )             25.9     03-21    138  |  105  |  13  ----------------------------<  106<H>  4.1   |  28  |  0.71    Ca    8.5      21 Mar 2022 06:52        CAPILLARY BLOOD GLUCOSE      POCT Blood Glucose.: 111 mg/dL (21 Mar 2022 16:44)  POCT Blood Glucose.: 152 mg/dL (21 Mar 2022 11:19)  POCT Blood Glucose.: 116 mg/dL (21 Mar 2022 07:44)  POCT Blood Glucose.: 136 mg/dL (20 Mar 2022 22:02)      MEDICATIONS  (STANDING):  aluminum hydroxide/magnesium hydroxide/simethicone Suspension 30 milliLiter(s) Oral every 6 hours  aMIOdarone    Tablet 200 milliGRAM(s) Oral daily  brimonidine 0.2% Ophthalmic Solution 1 Drop(s) Both EYES two times a day  dextrose 40% Gel 15 Gram(s) Oral once  dextrose 5%. 1000 milliLiter(s) (50 mL/Hr) IV Continuous <Continuous>  dextrose 5%. 1000 milliLiter(s) (100 mL/Hr) IV Continuous <Continuous>  dextrose 50% Injectable 25 Gram(s) IV Push once  dextrose 50% Injectable 12.5 Gram(s) IV Push once  dextrose 50% Injectable 25 Gram(s) IV Push once  DULoxetine 60 milliGRAM(s) Oral daily  furosemide    Tablet 20 milliGRAM(s) Oral daily  glucagon  Injectable 1 milliGRAM(s) IntraMuscular once  insulin lispro (ADMELOG) corrective regimen sliding scale   SubCutaneous three times a day before meals  insulin lispro (ADMELOG) corrective regimen sliding scale   SubCutaneous at bedtime  latanoprost 0.005% Ophthalmic Solution 1 Drop(s) Both EYES at bedtime  metoprolol succinate ER 50 milliGRAM(s) Oral daily  pancrelipase  (CREON 36,000 Lipase Units) 3 Capsule(s) Oral three times a day with meals  pantoprazole    Tablet 40 milliGRAM(s) Oral before breakfast  piperacillin/tazobactam IVPB.. 3.375 Gram(s) IV Intermittent every 8 hours  rivaroxaban 20 milliGRAM(s) Oral with dinner  tamsulosin 0.4 milliGRAM(s) Oral at bedtime  timolol 0.5% Solution 1 Drop(s) Both EYES every 12 hours  traMADol 25 milliGRAM(s) Oral at bedtime

## 2022-03-21 NOTE — CONSULT NOTE ADULT - PROBLEM SELECTOR RECOMMENDATION 9
Type 2 A1c 5.8% adm DFU  Recommend endocrine-[facility] on consult  FU appt: TBA  DSC recommendations: return to home regimen and diet & exercise  diabetes education provided  Diabetes support info and cell # [phone] given   Goal 100-180 mg/dL; 140-180 mg/dL in critical care areas

## 2022-03-21 NOTE — PROGRESS NOTE ADULT - SUBJECTIVE AND OBJECTIVE BOX
[facility]  INFECTIOUS DISEASES   [address] [place] [address]  Tel: [phone]     Fax: [phone]  ======================================================  MD Clement Lira Kaushal, MD Cho, Michelle, MD   ======================================================    [id number]  JUANIS BUSTAMANTEANABELDIDIER     Follow up ; Foot ulcer and cellulitis    Swelling and erythema are better, no pain.   Awaiting bone scan.   No fever or any new complaint.     PAST MEDICAL & SURGICAL HISTORY:  HTN (hypertension)  HLD (hyperlipidemia)  GERD (gastroesophageal reflux disease)  Cardiomyopathy  MSSA bacteremia  9/2021  Acute osteomyelitis  Pulmonary embolism  Pancreas cancer  chemo, s/p Whipple  Kiana (hard of hearing)  B/L hearing aids  History of total hip replacement  left X 1, 2 revisions  History of laminectomyX3  ICD (implantable cardiac defibrillator) in place  implanted 2005, replaced 10/4/2021 [employer] Subcutaneous ICD  Benign testicular tumor radiation  Status post amputation of toe of right foot8/2021  Status post fracture of femur  2017 left with hardware  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 1000 milliLiter(s) (50 mL/Hr) IV Continuous <Continuous>  dextrose 5%. 1000 milliLiter(s) (100 mL/Hr) IV Continuous <Continuous>  dextrose 50% Injectable 25 Gram(s) IV Push once  dextrose 50% Injectable 12.5 Gram(s) IV Push once  dextrose 50% Injectable 25 Gram(s) IV Push once  DULoxetine 60 milliGRAM(s) Oral daily  furosemide    Tablet 20 milliGRAM(s) Oral daily  glucagon  Injectable 1 milliGRAM(s) IntraMuscular once  insulin lispro (ADMELOG) corrective regimen sliding scale   SubCutaneous three times a day before meals  insulin lispro (ADMELOG) corrective regimen sliding scale   SubCutaneous at bedtime  latanoprost 0.005% Ophthalmic Solution 1 Drop(s) Both EYES at bedtime  metoprolol succinate ER 50 milliGRAM(s) Oral daily  pancrelipase  (CREON 36,000 Lipase Units) 3 Capsule(s) Oral three times a day with meals  pantoprazole    Tablet 40 milliGRAM(s) Oral before breakfast  rivaroxaban 20 milliGRAM(s) Oral with dinner  tamsulosin 0.4 milliGRAM(s) Oral at bedtime  timolol 0.5% Solution 1 Drop(s) Both EYES every 12 hours  vancomycin  IVPB 1250 milliGRAM(s) IV Intermittent every 12 hours     REVIEW OF SYSTEMS:  CONSTITUTIONAL:  No Fever or chills  HEENT:  No diplopia or blurred vision.  No sore throat or runny nose.  CARDIOVASCULAR:  No chest pain or SOB.  RESPIRATORY:  No cough, shortness of breath, PND or orthopnea.  GASTROINTESTINAL:  No nausea, vomiting or diarrhea.  GENITOURINARY:  No dysuria, frequency or urgency. No Blood in urine  MUSCULOSKELETAL:  no joint aches, no muscle pain  SKIN:  foot ulcer   NEUROLOGIC:  No paresthesias, fasciculations, seizures or weakness.  PSYCHIATRIC:  No disorder of thought or mood.  ENDOCRINE:  No heat or cold intolerance, polyuria or polydipsia.  HEMATOLOGICAL:  No easy bruising or bleeding.     Physical Exam:  Vital Signs Last 24 Hrs  T(C): 36.6 (21 Mar 2022 11:50), Max: 36.8 (20 Mar 2022 21:22)  T(F): 97.8 (21 Mar 2022 11:50), Max: 98.2 (20 Mar 2022 21:22)  HR: 61 (21 Mar 2022 11:50) (61 - 70)  BP: 106/63 (21 Mar 2022 11:50) (99/56 - 115/69)  BP(mean): --  RR: 17 (21 Mar 2022 11:50) (17 - 17)  SpO2: 98% (21 Mar 2022 11:50) (93% - 98%)  GEN: NAD, obese   HEENT: normocephalic and atraumatic. EOMI. PERRL.    NECK: Supple.  No lymphadenopathy  LUNGS: Clear to auscultation.  HEART: Regular rate and rhythm, Left lower chest AICD  ABDOMEN: Soft, nontender, and nondistended.   Open wound mid abdomen with serous discharge, no sign of infection, healing from edges    No sign of infection or cellulitis.   : No CVA tenderness  EXTREMITIES: R foot s/p hallux amputation, 2nd toe is a hammer toe,   dorsal mid-digit ulcer with bloody purulent discharge and open wound that probes to bone.   NEUROLOGIC: grossly intact.  PSYCHIATRIC: Appropriate affect .  SKIN: No rash    Labs:                        8.4    4.06  )-----------( 145      ( 21 Mar 2022 06:52 )             25.9     03-21    138  |  105  |  13  ----------------------------<  106<H>  4.1   |  28  |  0.71    Ca    8.5      21 Mar 2022 06:52    Culture - Other (collected 03-17-22 @ 23:28)  Source: .Other Right foot 2nd digit  Final Report (03-20-22 @ 16:48):    Moderate Citrobacter koseri    Few Klebsiella pneumoniae    Few Enterococcus faecalis    Normal skin davonte isolated  Organism: Citrobacter koseri  Klebsiella pneumoniae  Enterococcus faecalis (03-20-22 @ 16:48)  Organism: Enterococcus faecalis (03-20-22 @ 16:48)    Sensitivities:      -  Ampicillin: S <=2 Predicts results to ampicillin/sulbactam, amoxacillin-clavulanate and  piperacillin-tazobactam.      -  Tetra/Doxy: R >8      -  Vancomycin: S 1      Method Type: TYSON  Organism: Klebsiella pneumoniae (03-20-22 @ 16:48)    Sensitivities:      -  Amikacin: S <=16      -  Amoxicillin/Clavulanic Acid: S <=8/4      -  Ampicillin: R 16 These ampicillin results predict results for amoxicillin      -  Ampicillin/Sulbactam: S <=4/2 Enterobacter, Klebsiella aerogenes, Citrobacter, and Serratia may develop resistance during prolonged therapy (3-4 days)      -  Aztreonam: S <=4      -  Cefazolin: S <=2 Enterobacter, Klebsiella aerogenes, Citrobacter, and Serratia may develop resistance during prolonged therapy (3-4 days)      -  Cefepime: S <=2      -  Cefoxitin: S <=8      -  Ceftriaxone: S <=1 Enterobacter, Klebsiella aerogenes, Citrobacter, and Serratia may develop resistance during prolonged therapy      -  Ciprofloxacin: S <=0.25      -  Ertapenem: S <=0.5      -  Gentamicin: S <=2      -  Imipenem: S <=1      -  Levofloxacin: S <=0.5      -  Meropenem: S <=1      -  Piperacillin/Tazobactam: S <=8      -  Tobramycin: S <=2      -  Trimethoprim/Sulfamethoxazole: S <=0.5/9.5      Method Type: TYSON  Organism: Citrobacter koseri (03-20-22 @ 16:48)    Sensitivities:      -  Amikacin: S <=16      -  Amoxicillin/Clavulanic Acid: S <=8/4      -  Ampicillin: R >16 These ampicillin results predict results for amoxicillin      -  Ampicillin/Sulbactam: S <=4/2 Enterobacter, Klebsiella aerogenes, Citrobacter, and Serratia may develop resistance during prolonged therapy (3-4 days)      -  Aztreonam: S <=4      -  Cefazolin: S <=2 Enterobacter, Klebsiella aerogenes, Citrobacter, and Serratia may develop resistance during prolonged therapy (3-4 days)      -  Cefepime: S <=2      -  Cefotaxime: S <=2      -  Cefoxitin: S <=8      -  Ceftazidime: S <=1      -  Ceftriaxone: S <=1 Enterobacter, Klebsiella aerogenes, Citrobacter, and Serratia may develop resistance during prolonged therapy      -  Cefuroxime: S 8      -  Ciprofloxacin: S <=0.25      -  Ertapenem: S <=0.5      -  Gentamicin: S <=2      -  Imipenem: S <=1      -  Levofloxacin: S <=0.5      -  Meropenem: S <=1      -  Minocycline: S <=4      -  Piperacillin/Tazobactam: S <=8      -  Tigecycline: S <=2      -  Tobramycin: S <=2      -  Trimethoprim/Sulfamethoxazole: S <=0.5/9.5      Method Type: TYSON    Culture - Blood (collected 03-17-22 @ 22:01)  Source: .Blood Blood-Peripheral    Culture - Blood (collected 03-17-22 @ 22:01)  Source: .Blood Blood-Peripheral    WBC Count: 4.06 K/uL (03-21-22 @ 06:52)  WBC Count: 3.68 K/uL (03-20-22 @ 05:58)  WBC Count: 2.93 K/uL (03-19-22 @ 09:01)  WBC Count: 3.01 K/uL (03-18-22 @ 08:54)  WBC Count: 5.01 K/uL (03-17-22 @ 17:54)  WBC Count: 5.23 K/uL (03-17-22 @ 13:09)    Creatinine, Serum: 0.71 mg/dL (03-21-22 @ 06:52)  Creatinine, Serum: 0.73 mg/dL (03-20-22 @ 05:58)  Creatinine, Serum: 0.70 mg/dL (03-19-22 @ 09:01)  Creatinine, Serum: 0.73 mg/dL (03-18-22 @ 08:54)  Creatinine, Serum: 0.80 mg/dL (03-17-22 @ 17:54)    C-Reactive Protein, Serum: 15 mg/L (03-17-22 @ 21:53)    Sedimentation Rate, Erythrocyte: 56 mm/hr (03-17-22 @ 17:54)    COVID-19 PCR: NotDetec (03-17-22 @ 17:54)  COVID-19 PCR: NotDetec (03-16-22 @ 19:16)    All imaging and other data have been reviewed.  < from: Xray Foot AP + Lateral + Oblique, Right (03.17.22 @ 17:36) >  ACC: [id number] EXAM:  XR CHEST PORTABLE IMMED 1V                        ACC: [id number] EXAM:  XR FOOT COMP MIN 3 VIEWS RT                        PROCEDURE DATE:  03/17/2022    INTERPRETATION:  Right foot and chest. Patient has an infection ofthe   second toe.  Right foot. 3 views.  Again noted is a well-healed amputation of the distal first metatarsal.  There is mild hammertoe deformity but likely of the second toe. No active   bone destruction or fracture evident. Findings similarto October 28, 2021.  AP semierect chest on March 17, 2022 at 5:18 PM.  Elevated diaphragms crowds the chest.  Heart magnified by technique.  Left epicardial pacemaker and very extensive thoracolumbar hardware again   noted.  Persistent mild left base atelectasis.  Findings similar to CAT scan of March 7 of this year.  Temporary aortic valve again noted.  IMPRESSION: Stable right foot findings. Persistent minimal atelectasis   left base. Other findings as above.    Assessment and Plan:   79 yo man with PMH of HTN, HFrEF, CAD, Vtach with AICD, TAVR, PEs, Spinal stenosis, BPH and Left THR with revisions, pancreatic cancer (dx 3/2021, chemo) s/p ERCP s/p whipple 3/2021 and 10/26/21, and Osteomyelitis of R hallux s/p amputation 9/202 and MSSA bacteremia was admitted from wound center with R 2nd toe chronic wound and infection.   Last time hallux infection showed MSSA causing bacteremia and after surgery tissue culture was pseudomonas and staph Pettenkoferi (Oxacillin Sensitive).  ESR 56 and CRP 15   Xray with no bone involvement     He had pseudomonas and staph aureus and staph Pettenkoferi both Sensitive, in last admission and has citrobacter, Klebsiella and enterococcus this time.     R 2nd toe cellulitis and chronic wound   - Blood culture x 2 NGTD  - Wound culture with citrobacter, Klebsiella and enterococcus    - Bone scan will be done today and tomorrow  - Podiatry and vascular are on the case   - Continue Zosyn 3.375gm q8h to cover old and new cultures  - Possibly will need amputation     Will follow.    Jaren Bell MD  Division of Infectious Diseases   Please call ID service at [phone] with any question.      35 minutes spent on total encounter assessing patient, examination, chart reivew, counseling and coordinating care by the attending physician/nurse/care manager.

## 2022-03-22 NOTE — CONSULT NOTE ADULT - ASSESSMENT
80 year old male with PMH pancreatic cancer (dx 3/2021, chemo) s/p ERCP s/p whipple 3/2021 and 10/26/21, HTN, HLD, HFrEF (Oct 2021 EF: 35%), CAD s/p stents x2 (~15 years ago), Vtach with AICD (implanted 2005, extracted and re-implantation 9/2021 and 10/4/21), TAVR (4/2021), bilateral PE (7/2021) on Xarelto, spinal stenosis, macular degeneration, GERD, BPH, Left THR (x2 revisions), left femur fracture (hardware), Osteomyelitis s/p R hallux amputation 9/2021, MSSA/VRE bacteremia presenting with wound of RLE 2nd digit.  Pt is on antibiotic treatment with positive wound cultures, NM scan suggestive of soft tissue infection on the plantar aspect of the right second toe. No scan evidence of osteomyelitis. Pt is going for b/l 2nd digit arthroplasty tomorrow.     -----NOTE IN PROGRESS -----      # CAD  - h/o stent placement x2  - EKG 03/17: Line NSR, probable APC. LBBB. No signs of acute ischemia     # h/o VT   - h/o unstable VT with ICD placement on 09/21 c/b bacteremia and last placed on 10/2021, with negative CORBIN for endocarditis.   - On amiodarone 200mg daily and metoprolol ER 50 mg daily, continue same doses     # Chronic systolic HF   - CORBIN 11/09/21: estimated EF 30%. Severe global LV systolic dysfunction. Mitral annular and mitral leaflets calcification. Mild MR. Moderate MS. Transcatheter aortic valve replacement.   - On furosemide 20 mg at home and metoprolol, continue same meds    # h/o PE   - PE in 07/2021 in the setting of malignancy  - On xarelto 20 mg daily at home   - Continue to hold Xarelto for surgical procedure.       - No evidence of acute ischemia, patient asymptomatic.  - No evidence of volume overload.  - No acute changes on EKG compared to previous.  - Previous echo in 02/17 shows normal LV function with EF: __%, no significant valvular disease noted.  - BP well controlled, continue home BP meds, monitor routine hemodynamics.  - Monitor and replete lytes, keep K>4, Mg>2.  - Patient has no modifiable active cardiac risk factors (Atherosclerotic disease, LV dysfunction, chronic valvular disease). He has no active ischemia, decompensated HF, unstable arrythmia, or severe stenotic valvular disease, and in the setting of intermediate risk procedure, patient is optimized from cardiovascular standpoint to proceed with planned surgery with routine hemodynamic monitoring.   - Other cardiovascular workup will depend on clinical course. All other workup per primary team.                         80 year old male with PMH pancreatic cancer (dx 3/2021, chemo) s/p ERCP s/p whipple 3/2021 and 10/26/21, HTN, HLD, HFrEF (Oct 2021 EF: 35%), CAD s/p stents x2 (~15 years ago), Vtach with AICD (implanted 2005, extracted and re-implantation 9/2021 and 10/4/21), TAVR (4/2021), bilateral PE (7/2021) on Xarelto, spinal stenosis, macular degeneration, GERD, BPH, Left THR (x2 revisions), left femur fracture (hardware), Osteomyelitis s/p R hallux amputation 9/2021, MSSA/VRE bacteremia presenting with wound of RLE 2nd digit.  Pt is on antibiotic treatment with positive wound cultures, NM scan suggestive of soft tissue infection on the plantar aspect of the right second toe. No scan evidence of osteomyelitis. Pt is going for b/l 2nd digit arthroplasty tomorrow.      Cardiology consulted for presurgical evaluation. Pt is compliant with all his medications, and has been feeling good and stable; denies dizziness, lightheadedness, chest pain or pressure, worsening CHAUDHARY, LE edema.     # CAD  - h/o stent placement x2 ~15 years ago.  - EKG 03/17: Line NSR, probable APC. LBBB. No signs of acute ischemia   - Pt is asymptomatic.     # h/o VT   - h/o unstable VT with ICD placement on 09/21 c/b bacteremia and last placed on 10/2021, with negative CORBIN for endocarditis.   - On amiodarone 200mg daily and metoprolol ER 50 mg daily, continue same doses.   - Pt is stable, EKG with NSR      # Chronic systolic HF   - CORBIN 11/09/21: estimated EF 30%. Severe global LV systolic dysfunction. Mitral annular and mitral leaflets calcification. Mild MR. Moderate MS. Transcatheter aortic valve replacement.   - On furosemide 20 mg at home and metoprolol, continue same meds  - No signs of volume overload on exam, no symptoms 2/2 acute decompensation     # h/o PE   - PE in 07/2021 in the setting of malignancy  - On xarelto 20 mg daily at home   - Continue to hold Xarelto for surgical procedure.     - BP has been normal and stable. Continue routine hemodynamic monitoring.   - Normal lytes and renal function.   - Monitor and replete lytes, keep K>4, Mg>2.    Patient has no modifiable active cardiac risk factors (Atherosclerotic disease, LV dysfunction, chronic valvular disease). He has no active ischemia, decompensated HF, unstable arrythmia, or severe stenotic valvular disease, and in the setting of podiatry intermediate risk procedure, patient is optimized from cardiovascular standpoint to proceed with planned surgery with routine hemodynamic monitoring.   Other cardiovascular workup will depend on clinical course. All other workup per primary team.

## 2022-03-22 NOTE — PROGRESS NOTE ADULT - ASSESSMENT
80 year old male with PMH pancreatic cancer (dx 3/2021, chemo) s/p ERCP s/p whipple 3/2021 and 10/26/21, HTN, HLD, HFrEF (Oct 2021 EF: 35%), CAD s/p stents x2 (~15 years ago), Vtach with AICD (implanted 2005, extracted and re-implantation 9/2021 and 10/4/21), TAVR (4/2021), bilateral PE (7/2021) on Xarelto, spinal stenosis, macular degeneration, GERD, BPH, Left THR (x2 revisions), left femur fracture (hardware), Osteomyelitis s/p R hallux amputation 9/2021, MSSA/VRE bacteremia admitted for infected wound of RLE 2nd digit.      Problem/Plan - 1:  ·  Problem: Diabetic toe ulcer.   ·  Plan: Sent by wound care for ulceration of RLE 2nd digit  Sep 2021 episode of osteomyelitis of R hallux s/p amputation   Afebrile, no leukocytosis, Lactate wnl, elevated ESR  Care d/w ID, pt now off vanco and cefepime, changed to zosyn; care d/w ID, recommends cont zosyn for now, but if pt planned/cleared for discharge, can consider d/c on po augmentin; however, as per care d/w Podiatry today, risk of limb loss if underlying condition is untreated is high, so surgical intervention may be necessary, and right 2nd digit amputation is being possibly planned  - will need cardiac clearance given extensive cardiac history including CAD s/p stent and HFREF  - medically pt has RCRI of 2, no acute cp, or sob, or acute ischemic changes, Cr wnl, BP in acceptable range, hds, would defer further cardiac optimization to cardiology for pre-operative clearance, otherwise no active medical conditions requiring further optimization; pt is high risk for low risk procedure  Tylenol PRN for fevers  3/17 blood cx show ngtd, tissue cx with klebsiella, citrobacter, and enterococcous  Check WBC bone scan - patient cannot have MRI due to AICD - WBC scan in progress, awaiting final results, and final ID recs  RLE xray shows no bone destruction or fracture, s/p amputation of distal 1st metatarsal  WBAT  Surgical intervention pending results of above  Podiatry consulted, f/u recs  ID Dr Bell consulted, appreciate recs; as per d/w ID, wound care consult called, and recs reviewed  Vascular consulted, appreciate recs - no indication for surgical intervention at this time  Pain control with tylenol for mild pain, tramadol added for moderate pain, given regularly at bedtime     Problem/Plan - 2:  ·  Problem: Chronic HFrEF (heart failure with reduced ejection fraction).   ·  Plan: Oct 2021 EF 35% with global LV dysfunction  Continue home lasix   Continue home metoprolol  CXR with some atelectasis, no PNA, consolidation, or effusions  Patient has AICD - not MRI compatible  apprec cardio consult and poss optimization.     Problem/Plan - 3:  ·  Problem: Pulmonary embolism.   ·  Plan: July 2021 b/l PEs  Continue home xarelto.     Problem/Plan - 4:  ·  Problem: T2DM (type 2 diabetes mellitus).   ·  Plan: HbA1c at 5.8, controlled  Start insulin corrective scale while inpatient  Routine glycemic monitoring.     Problem/Plan - 5:  ·  Problem: Ventricular tachyarrhythmia.   ·  Plan: Hx of Vtach during previous admission at [facility] for Whipple  Has AICD in place (reimplanted Oct 2021 due to bacteremia)  Continue home amiodarone.     Problem/Plan - 6:  ·  Problem: Pancreatic cancer.   ·  Plan: s/p Whipple x2, most recent Oct 2021  Continue home pancrelipase  Abdominal wound in midline, receives hyperbaric O2 therapy  Wound care consulted, appreciate recs  Some abdominal bloating - maalox started     Problem/Plan - 7:  ·  Problem: Macular degeneration.   ·  Plan: Continue home regimen: brimonidine, timolol, latonoprost.     Problem/Plan - 8:  ·  Problem: BPH (benign prostatic hyperplasia).   ·  Plan: Continue home flomax.     Problem/Plan - 9:  ·  Problem: GERD (gastroesophageal reflux disease).   ·  Plan: Continue home protonix.     Problem/Plan - 10:  ·  Problem: Need for prophylactic measure.   ·  Plan; DVT ppx: xarelto.   80 year old male with PMH pancreatic cancer (dx 3/2021, chemo) s/p ERCP s/p whipple 3/2021 and 10/26/21, HTN, HLD, HFrEF (Oct 2021 EF: 35%), CAD s/p stents x2 (~15 years ago), Vtach with AICD (implanted 2005, extracted and re-implantation 9/2021 and 10/4/21), TAVR (4/2021), bilateral PE (7/2021) on Xarelto, spinal stenosis, macular degeneration, GERD, BPH, Left THR (x2 revisions), left femur fracture (hardware), Osteomyelitis s/p R hallux amputation 9/2021, MSSA/VRE bacteremia admitted for infected wound of RLE 2nd digit.      Problem/Plan - 1:  ·  Problem: Diabetic toe ulcer.   ·  Plan: Sent by wound care for ulceration of RLE 2nd digit  Sep 2021 episode of osteomyelitis of R hallux s/p amputation   Afebrile, no leukocytosis, Lactate wnl, elevated ESR  Care d/w ID, pt now off vanco and cefepime, changed to zosyn; care d/w ID, recommends cont zosyn for now, but if pt planned/cleared for discharge, can consider d/c on po augmentin; NM scan shows no signs of ostepmyleitis; however, as per care d/w Podiatry today, risk of limb loss if underlying condition is untreated is high, and early osteo may not have been caught by WBC scan, so surgical intervention may be necessary, and right 2nd digit amputation is being possibly planned  - will need cardiac clearance given extensive cardiac history including CAD s/p stent and HFREF  - medically pt has RCRI of 2, no acute cp, or sob, or acute ischemic changes, Cr wnl, BP in acceptable range, hds, would defer further cardiac optimization to cardiology for pre-operative clearance, otherwise no active medical conditions requiring further optimization; pt is high risk for low risk procedure  Tylenol PRN for fevers  3/17 blood cx show ngtd, tissue cx with klebsiella, citrobacter, and enterococcous  Check WBC bone scan - patient cannot have MRI due to AICD - WBC scan in progress, awaiting final results, and final ID recs  RLE xray shows no bone destruction or fracture, s/p amputation of distal 1st metatarsal  WBAT  Surgical intervention pending results of above  Podiatry consulted, f/u recs  ID Dr Bell consulted, appreciate recs; as per d/w ID, wound care consult called, and recs reviewed  Vascular consulted, appreciate recs - no indication for surgical intervention at this time  Pain control with tylenol for mild pain, tramadol added for moderate pain, given regularly at bedtime     Problem/Plan - 2:  ·  Problem: Chronic HFrEF (heart failure with reduced ejection fraction).   ·  Plan: Oct 2021 EF 35% with global LV dysfunction  Continue home lasix   Continue home metoprolol  CXR with some atelectasis, no PNA, consolidation, or effusions  Patient has AICD - not MRI compatible  apprec cardio consult and poss optimization.     Problem/Plan - 3:  ·  Problem: Pulmonary embolism.   ·  Plan: July 2021 b/l PEs  Continue home xarelto.     Problem/Plan - 4:  ·  Problem: T2DM (type 2 diabetes mellitus).   ·  Plan: HbA1c at 5.8, controlled  Start insulin corrective scale while inpatient  Routine glycemic monitoring.     Problem/Plan - 5:  ·  Problem: Ventricular tachyarrhythmia.   ·  Plan: Hx of Vtach during previous admission at [facility] for Whipple  Has AICD in place (reimplanted Oct 2021 due to bacteremia)  Continue home amiodarone.     Problem/Plan - 6:  ·  Problem: Pancreatic cancer.   ·  Plan: s/p Whipple x2, most recent Oct 2021  Continue home pancrelipase  Abdominal wound in midline, receives hyperbaric O2 therapy  Wound care consulted, appreciate recs  Some abdominal bloating - maalox started     Problem/Plan - 7:  ·  Problem: Macular degeneration.   ·  Plan: Continue home regimen: brimonidine, timolol, latonoprost.     Problem/Plan - 8:  ·  Problem: BPH (benign prostatic hyperplasia).   ·  Plan: Continue home flomax.     Problem/Plan - 9:  ·  Problem: GERD (gastroesophageal reflux disease).   ·  Plan: Continue home protonix.     Problem/Plan - 10:  ·  Problem: Need for prophylactic measure.   ·  Plan; DVT ppx: xarelto.

## 2022-03-22 NOTE — PROGRESS NOTE ADULT - SUBJECTIVE AND OBJECTIVE BOX
SUBJECTIVE:    No acute events overnight, afebrile, hds.    VITAL SIGNS:    Vital Signs Last 24 Hrs  T(C): 36.8 (22 Mar 2022 12:03), Max: 36.8 (22 Mar 2022 12:03)  T(F): 98.2 (22 Mar 2022 12:03), Max: 98.2 (22 Mar 2022 12:03)  HR: 78 (22 Mar 2022 12:03) (69 - 78)  BP: 130/74 (22 Mar 2022 12:03) (118/71 - 130/74)  BP(mean): --  RR: 17 (22 Mar 2022 12:03) (17 - 17)  SpO2: 93% (22 Mar 2022 12:03) (93% - 95%)    PHYSICAL EXAM:     GENERAL: no acute distress  HEENT: NC/AT, EOMI, neck supple, MMM  RESPIRATORY: LCTAB/L, no rhonchi, rales, or wheezing  CARDIOVASCULAR: RRR, no murmurs, gallops, rubs  ABDOMINAL: soft, non-tender, non-distended, positive bowel sounds; open wound with serous discharge   EXTREMITIES: no clubbing, cyanosis, or edema; R foot s/p hallux amputation, 2nd toe is a hammer toe,   dorsal mid-digit ulcer with bloody purulent discharge and open wound that probes to bone  NEUROLOGICAL: alert and oriented x 3, non-focal  MUSCULOSKELETAL: no gross joint deformity                            8.4    3.20  )-----------( 132      ( 22 Mar 2022 07:16 )             25.5     03-22    139  |  107  |  13  ----------------------------<  102<H>  4.1   |  26  |  0.75    Ca    8.7      22 Mar 2022 07:16        CAPILLARY BLOOD GLUCOSE      POCT Blood Glucose.: 161 mg/dL (22 Mar 2022 11:31)  POCT Blood Glucose.: 108 mg/dL (22 Mar 2022 07:58)  POCT Blood Glucose.: 164 mg/dL (21 Mar 2022 22:14)  POCT Blood Glucose.: 111 mg/dL (21 Mar 2022 16:44)      MEDICATIONS  (STANDING):  aluminum hydroxide/magnesium hydroxide/simethicone Suspension 30 milliLiter(s) Oral every 6 hours  aMIOdarone    Tablet 200 milliGRAM(s) Oral daily  brimonidine 0.2% Ophthalmic Solution 1 Drop(s) Both EYES two times a day  dextrose 40% Gel 15 Gram(s) Oral once  dextrose 5%. 1000 milliLiter(s) (50 mL/Hr) IV Continuous <Continuous>  dextrose 5%. 1000 milliLiter(s) (100 mL/Hr) IV Continuous <Continuous>  dextrose 50% Injectable 25 Gram(s) IV Push once  dextrose 50% Injectable 12.5 Gram(s) IV Push once  dextrose 50% Injectable 25 Gram(s) IV Push once  DULoxetine 60 milliGRAM(s) Oral daily  furosemide    Tablet 20 milliGRAM(s) Oral daily  glucagon  Injectable 1 milliGRAM(s) IntraMuscular once  insulin lispro (ADMELOG) corrective regimen sliding scale   SubCutaneous three times a day before meals  insulin lispro (ADMELOG) corrective regimen sliding scale   SubCutaneous at bedtime  latanoprost 0.005% Ophthalmic Solution 1 Drop(s) Both EYES at bedtime  metoprolol succinate ER 50 milliGRAM(s) Oral daily  pancrelipase  (CREON 36,000 Lipase Units) 3 Capsule(s) Oral three times a day with meals  pantoprazole    Tablet 40 milliGRAM(s) Oral before breakfast  piperacillin/tazobactam IVPB.. 3.375 Gram(s) IV Intermittent every 8 hours  rivaroxaban 20 milliGRAM(s) Oral with dinner  tamsulosin 0.4 milliGRAM(s) Oral at bedtime  timolol 0.5% Solution 1 Drop(s) Both EYES every 12 hours  traMADol 25 milliGRAM(s) Oral at bedtime

## 2022-03-22 NOTE — PROGRESS NOTE ADULT - SUBJECTIVE AND OBJECTIVE BOX
[facility]  INFECTIOUS DISEASES   [address] [place] [address]  Tel: [phone]     Fax: [phone]  ======================================================  MD Clement Lira Kaushal, MD Cho, Michelle, MD   ======================================================    [id number]  JUANIS BUSTAMANTEANABELDIDIER     Follow up ; Foot ulcer and cellulitis    Swelling and erythema are better, no pain.   Awaiting bone scan.   No fever or any new complaint.     PAST MEDICAL & SURGICAL HISTORY:  HTN (hypertension)  HLD (hyperlipidemia)  GERD (gastroesophageal reflux disease)  Cardiomyopathy  MSSA bacteremia  9/2021  Acute osteomyelitis  Pulmonary embolism  Pancreas cancer  chemo, s/p Whipple  [place] (hard of hearing)  B/L hearing aids  History of total hip replacement  left X 1, 2 revisions  History of laminectomyX3  ICD (implantable cardiac defibrillator) in place  implanted 2005, replaced 10/4/2021 [employer] Subcutaneous ICD  Benign testicular tumor radiation  Status post amputation of toe of right foot8/2021  Status post fracture of femur  2017 left with hardware  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 1000 milliLiter(s) (50 mL/Hr) IV Continuous <Continuous>  dextrose 5%. 1000 milliLiter(s) (100 mL/Hr) IV Continuous <Continuous>  dextrose 50% Injectable 25 Gram(s) IV Push once  dextrose 50% Injectable 12.5 Gram(s) IV Push once  dextrose 50% Injectable 25 Gram(s) IV Push once  DULoxetine 60 milliGRAM(s) Oral daily  furosemide    Tablet 20 milliGRAM(s) Oral daily  glucagon  Injectable 1 milliGRAM(s) IntraMuscular once  insulin lispro (ADMELOG) corrective regimen sliding scale   SubCutaneous three times a day before meals  insulin lispro (ADMELOG) corrective regimen sliding scale   SubCutaneous at bedtime  latanoprost 0.005% Ophthalmic Solution 1 Drop(s) Both EYES at bedtime  metoprolol succinate ER 50 milliGRAM(s) Oral daily  pancrelipase  (CREON 36,000 Lipase Units) 3 Capsule(s) Oral three times a day with meals  pantoprazole    Tablet 40 milliGRAM(s) Oral before breakfast  rivaroxaban 20 milliGRAM(s) Oral with dinner  tamsulosin 0.4 milliGRAM(s) Oral at bedtime  timolol 0.5% Solution 1 Drop(s) Both EYES every 12 hours  vancomycin  IVPB 1250 milliGRAM(s) IV Intermittent every 12 hours     REVIEW OF SYSTEMS:  CONSTITUTIONAL:  No Fever or chills  HEENT:  No diplopia or blurred vision.  No sore throat or runny nose.  CARDIOVASCULAR:  No chest pain or SOB.  RESPIRATORY:  No cough, shortness of breath, PND or orthopnea.  GASTROINTESTINAL:  No nausea, vomiting or diarrhea.  GENITOURINARY:  No dysuria, frequency or urgency. No Blood in urine  MUSCULOSKELETAL:  no joint aches, no muscle pain  SKIN:  foot ulcer   NEUROLOGIC:  No paresthesias, fasciculations, seizures or weakness.  PSYCHIATRIC:  No disorder of thought or mood.  ENDOCRINE:  No heat or cold intolerance, polyuria or polydipsia.  HEMATOLOGICAL:  No easy bruising or bleeding.     Physical Exam:  Vital Signs Last 24 Hrs  T(C): 36.6 (21 Mar 2022 11:50), Max: 36.8 (20 Mar 2022 21:22)  T(F): 97.8 (21 Mar 2022 11:50), Max: 98.2 (20 Mar 2022 21:22)  HR: 61 (21 Mar 2022 11:50) (61 - 70)  BP: 106/63 (21 Mar 2022 11:50) (99/56 - 115/69)  BP(mean): --  RR: 17 (21 Mar 2022 11:50) (17 - 17)  SpO2: 98% (21 Mar 2022 11:50) (93% - 98%)  GEN: NAD, obese   HEENT: normocephalic and atraumatic. EOMI. PERRL.    NECK: Supple.  No lymphadenopathy  LUNGS: Clear to auscultation.  HEART: Regular rate and rhythm, Left lower chest AICD  ABDOMEN: Soft, nontender, and nondistended.   Open wound mid abdomen with serous discharge, no sign of infection, healing from edges    No sign of infection or cellulitis.   : No CVA tenderness  EXTREMITIES: R foot s/p hallux amputation, 2nd toe is a hammer toe,   dorsal mid-digit ulcer with bloody purulent discharge and open wound that probes to bone.   NEUROLOGIC: grossly intact.  PSYCHIATRIC: Appropriate affect .  SKIN: No rash    Labs:                        8.4    4.06  )-----------( 145      ( 21 Mar 2022 06:52 )             25.9     03-21    138  |  105  |  13  ----------------------------<  106<H>  4.1   |  28  |  0.71    Ca    8.5      21 Mar 2022 06:52    Culture - Other (collected 03-17-22 @ 23:28)  Source: .Other Right foot 2nd digit  Final Report (03-20-22 @ 16:48):    Moderate Citrobacter koseri    Few Klebsiella pneumoniae    Few Enterococcus faecalis    Normal skin davonte isolated  Organism: Citrobacter koseri  Klebsiella pneumoniae  Enterococcus faecalis (03-20-22 @ 16:48)  Organism: Enterococcus faecalis (03-20-22 @ 16:48)    Sensitivities:      -  Ampicillin: S <=2 Predicts results to ampicillin/sulbactam, amoxacillin-clavulanate and  piperacillin-tazobactam.      -  Tetra/Doxy: R >8      -  Vancomycin: S 1      Method Type: TYSON  Organism: Klebsiella pneumoniae (03-20-22 @ 16:48)    Sensitivities:      -  Amikacin: S <=16      -  Amoxicillin/Clavulanic Acid: S <=8/4      -  Ampicillin: R 16 These ampicillin results predict results for amoxicillin      -  Ampicillin/Sulbactam: S <=4/2 Enterobacter, Klebsiella aerogenes, Citrobacter, and Serratia may develop resistance during prolonged therapy (3-4 days)      -  Aztreonam: S <=4      -  Cefazolin: S <=2 Enterobacter, Klebsiella aerogenes, Citrobacter, and Serratia may develop resistance during prolonged therapy (3-4 days)      -  Cefepime: S <=2      -  Cefoxitin: S <=8      -  Ceftriaxone: S <=1 Enterobacter, Klebsiella aerogenes, Citrobacter, and Serratia may develop resistance during prolonged therapy      -  Ciprofloxacin: S <=0.25      -  Ertapenem: S <=0.5      -  Gentamicin: S <=2      -  Imipenem: S <=1      -  Levofloxacin: S <=0.5      -  Meropenem: S <=1      -  Piperacillin/Tazobactam: S <=8      -  Tobramycin: S <=2      -  Trimethoprim/Sulfamethoxazole: S <=0.5/9.5      Method Type: TYSON  Organism: Citrobacter koseri (03-20-22 @ 16:48)    Sensitivities:      -  Amikacin: S <=16      -  Amoxicillin/Clavulanic Acid: S <=8/4      -  Ampicillin: R >16 These ampicillin results predict results for amoxicillin      -  Ampicillin/Sulbactam: S <=4/2 Enterobacter, Klebsiella aerogenes, Citrobacter, and Serratia may develop resistance during prolonged therapy (3-4 days)      -  Aztreonam: S <=4      -  Cefazolin: S <=2 Enterobacter, Klebsiella aerogenes, Citrobacter, and Serratia may develop resistance during prolonged therapy (3-4 days)      -  Cefepime: S <=2      -  Cefotaxime: S <=2      -  Cefoxitin: S <=8      -  Ceftazidime: S <=1      -  Ceftriaxone: S <=1 Enterobacter, Klebsiella aerogenes, Citrobacter, and Serratia may develop resistance during prolonged therapy      -  Cefuroxime: S 8      -  Ciprofloxacin: S <=0.25      -  Ertapenem: S <=0.5      -  Gentamicin: S <=2      -  Imipenem: S <=1      -  Levofloxacin: S <=0.5      -  Meropenem: S <=1      -  Minocycline: S <=4      -  Piperacillin/Tazobactam: S <=8      -  Tigecycline: S <=2      -  Tobramycin: S <=2      -  Trimethoprim/Sulfamethoxazole: S <=0.5/9.5      Method Type: TYSON    Culture - Blood (collected 03-17-22 @ 22:01)  Source: .Blood Blood-Peripheral    Culture - Blood (collected 03-17-22 @ 22:01)  Source: .Blood Blood-Peripheral    WBC Count: 4.06 K/uL (03-21-22 @ 06:52)  WBC Count: 3.68 K/uL (03-20-22 @ 05:58)  WBC Count: 2.93 K/uL (03-19-22 @ 09:01)  WBC Count: 3.01 K/uL (03-18-22 @ 08:54)  WBC Count: 5.01 K/uL (03-17-22 @ 17:54)  WBC Count: 5.23 K/uL (03-17-22 @ 13:09)    Creatinine, Serum: 0.71 mg/dL (03-21-22 @ 06:52)  Creatinine, Serum: 0.73 mg/dL (03-20-22 @ 05:58)  Creatinine, Serum: 0.70 mg/dL (03-19-22 @ 09:01)  Creatinine, Serum: 0.73 mg/dL (03-18-22 @ 08:54)  Creatinine, Serum: 0.80 mg/dL (03-17-22 @ 17:54)    C-Reactive Protein, Serum: 15 mg/L (03-17-22 @ 21:53)    Sedimentation Rate, Erythrocyte: 56 mm/hr (03-17-22 @ 17:54)    COVID-19 PCR: NotDetec (03-17-22 @ 17:54)  COVID-19 PCR: NotDetec (03-16-22 @ 19:16)    All imaging and other data have been reviewed.  < from: Xray Foot AP + Lateral + Oblique, Right (03.17.22 @ 17:36) >  ACC: [id number] EXAM:  XR CHEST PORTABLE IMMED 1V                        ACC: [id number] EXAM:  XR FOOT COMP MIN 3 VIEWS RT                        PROCEDURE DATE:  03/17/2022    INTERPRETATION:  Right foot and chest. Patient has an infection ofthe   second toe.  Right foot. 3 views.  Again noted is a well-healed amputation of the distal first metatarsal.  There is mild hammertoe deformity but likely of the second toe. No active   bone destruction or fracture evident. Findings similarto October 28, 2021.  AP semierect chest on March 17, 2022 at 5:18 PM.  Elevated diaphragms crowds the chest.  Heart magnified by technique.  Left epicardial pacemaker and very extensive thoracolumbar hardware again   noted.  Persistent mild left base atelectasis.  Findings similar to CAT scan of March 7 of this year.  Temporary aortic valve again noted.  IMPRESSION: Stable right foot findings. Persistent minimal atelectasis   left base. Other findings as above.    Assessment and Plan:   79 yo man with PMH of HTN, HFrEF, CAD, Vtach with AICD, TAVR, PEs, Spinal stenosis, BPH and Left THR with revisions, pancreatic cancer (dx 3/2021, chemo) s/p ERCP s/p whipple 3/2021 and 10/26/21, and Osteomyelitis of R hallux s/p amputation 9/202 and MSSA bacteremia was admitted from wound center with R 2nd toe chronic wound and infection.   Last time hallux infection showed MSSA causing bacteremia and after surgery tissue culture was pseudomonas and staph Pettenkoferi (Oxacillin Sensitive).  ESR 56 and CRP 15   Xray with no bone involvement     He had pseudomonas and staph aureus and staph Pettenkoferi both Sensitive, in last admission and has citrobacter, Klebsiella and enterococcus this time.     R 2nd toe cellulitis and chronic wound   - Blood culture x 2 NGTD  - Wound culture with citrobacter, Klebsiella and enterococcus    - Bone scan negative for OM  - Podiatry and vascular are on the case   - Continue Zosyn 3.375gm q8h to cover old and new cultures  - As per podiatry plan for surgery and amputation will decide about the choice and length of ABx treatment.   If no surgery can switch to Augmentin 875mg q12 for total 10days.     Will follow.  D/W Dr. Phan.     Jaren Bell MD  Division of Infectious Diseases   Please call ID service at [phone] with any question.      35 minutes spent on total encounter assessing patient, examination, chart reivew, counseling and coordinating care by the attending physician/nurse/care manager.

## 2022-03-22 NOTE — CONSULT NOTE ADULT - CONSULT REQUESTED DATE/TIME
21-Mar-2022 13:24
18-Mar-2022 09:39
18-Mar-2022 15:35
17-Mar-2022 16:14
22-Mar-2022 17:58
18-Mar-2022

## 2022-03-22 NOTE — PROGRESS NOTE ADULT - SUBJECTIVE AND OBJECTIVE BOX
HPI:  81 year old Male with PMHx of pancreatic cancer (dx 3/2021, chemo) s/p ERCP s/p whipple 3/2021 and 10/26/21, HTN, HLD, HFrEF (Oct 2021 EF: 35%), CAD s/p stents x2 (~15 years ago), Vtach with AICD (implanted 2005, extracted and re-implantation 9/2021 and 10/4/21), TAVR (4/2021), bilateral PE (7/2021) on Xarelto, spinal stenosis, macular degeneration, GERD, BPH, Left THR (x2 revisions), left femur fracture (hardware), osteomyelitis s/p R hallux amputation 9/2021, MSSA/VRE bacteremia seen bedside today for infected right 2nd digit ulcer. Denies any pain to the area and does not have any other pedal complaints. Denies any fever, chills, nausea, vomiting, chest pain, shortness of breath, or calf pain at this time.    PAST MEDICAL & SURGICAL HISTORY:  HTN (hypertension)    HLD (hyperlipidemia)    GERD (gastroesophageal reflux disease)    Cardiomyopathy    MSSA bacteremia  9/2021    Acute osteomyelitis    Pulmonary embolism    Pancreas cancer  chemo    [place] (hard of hearing)  B/L hearing aids    History of total hip replacement  left X 1, 2 revisions    History of laminectomy  X3    ICD (implantable cardiac defibrillator) in place  implanted 2005, replaced 10/4/2021 [employer] Subcutaneous ICD    Benign testicular tumor  radiation    Status post amputation of toe of right foot  8/2021    Status post fracture of femur  2017 left with hardware    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    H/O Whipple procedure  2/2021    Allergies    Dilaudid (Anaphylaxis; Hives)    FAMILY HISTORY:  Family history of stroke (Mother)    MEDICATIONS  (STANDING):  aluminum hydroxide/magnesium hydroxide/simethicone Suspension 30 milliLiter(s) Oral every 6 hours  aMIOdarone    Tablet 200 milliGRAM(s) Oral daily  brimonidine 0.2% Ophthalmic Solution 1 Drop(s) Both EYES two times a day  dextrose 40% Gel 15 Gram(s) Oral once  dextrose 5%. 1000 milliLiter(s) (50 mL/Hr) IV Continuous <Continuous>  dextrose 5%. 1000 milliLiter(s) (100 mL/Hr) IV Continuous <Continuous>  dextrose 50% Injectable 25 Gram(s) IV Push once  dextrose 50% Injectable 12.5 Gram(s) IV Push once  dextrose 50% Injectable 25 Gram(s) IV Push once  DULoxetine 60 milliGRAM(s) Oral daily  furosemide    Tablet 20 milliGRAM(s) Oral daily  glucagon  Injectable 1 milliGRAM(s) IntraMuscular once  insulin lispro (ADMELOG) corrective regimen sliding scale   SubCutaneous three times a day before meals  insulin lispro (ADMELOG) corrective regimen sliding scale   SubCutaneous at bedtime  latanoprost 0.005% Ophthalmic Solution 1 Drop(s) Both EYES at bedtime  metoprolol succinate ER 50 milliGRAM(s) Oral daily  pancrelipase  (CREON 36,000 Lipase Units) 3 Capsule(s) Oral three times a day with meals  pantoprazole    Tablet 40 milliGRAM(s) Oral before breakfast  piperacillin/tazobactam IVPB.. 3.375 Gram(s) IV Intermittent every 8 hours  rivaroxaban 20 milliGRAM(s) Oral with dinner  tamsulosin 0.4 milliGRAM(s) Oral at bedtime  timolol 0.5% Solution 1 Drop(s) Both EYES every 12 hours  traMADol 25 milliGRAM(s) Oral at bedtime    MEDICATIONS  (PRN):  acetaminophen     Tablet .. 650 milliGRAM(s) Oral every 6 hours PRN Temp greater or equal to 38C (100.4F), Mild Pain (1 - 3)  ALPRAZolam 0.25 milliGRAM(s) Oral at bedtime PRN Insomnia  ondansetron Injectable 4 milliGRAM(s) IV Push every 6 hours PRN Nausea and/or Vomiting  traMADol 25 milliGRAM(s) Oral three times a day PRN Moderate Pain (4 - 6)    Vital Signs Last 24 Hrs  T(C): 36.6 (22 Mar 2022 04:58), Max: 36.7 (21 Mar 2022 21:07)  T(F): 97.8 (22 Mar 2022 04:58), Max: 98.1 (21 Mar 2022 21:07)  HR: 70 (22 Mar 2022 04:58) (69 - 70)  BP: 118/71 (22 Mar 2022 04:58) (118/71 - 122/70)  BP(mean): --  RR: 17 (22 Mar 2022 04:58) (17 - 17)  SpO2: 95% (22 Mar 2022 04:58) (94% - 95%)    PHYSICAL EXAM:  Vascular: DP palpable b/l, PT non-palpable b/l, Capillary refill 3 seconds to remaining digits, Digital hair absent bilaterally, right forefoot non-pitting edema.  Neurological: Light touch sensation diminished bilaterally.  Musculoskeletal: s/p right partial ray amputation. Hammered digits b/l. AJ & STJ ROM intact.  Dermatological: 1cm x 1.2cm x 0.2cm ulceration noted to the right dorsal digit, fibro:granular wound bed, (+) probe to bone, (+) periwound erythema, no malodor, proximal streaking has resolved, no fluctuance, serosanguinous drainage, no purulence.    CBC Full  -  ( 22 Mar 2022 07:16 )  WBC Count : 3.20 K/uL  RBC Count : 2.68 M/uL  Hemoglobin : 8.4 g/dL  Hematocrit : 25.5 %  Platelet Count - Automated : 132 K/uL  Mean Cell Volume : 95.1 fl  Mean Cell Hemoglobin : 31.3 pg  Mean Cell Hemoglobin Concentration : 32.9 gm/dL  Auto Neutrophil # : x  Auto Lymphocyte # : x  Auto Monocyte # : x  Auto Eosinophil # : x  Auto Basophil # : x  Auto Neutrophil % : x  Auto Lymphocyte % : x  Auto Monocyte % : x  Auto Eosinophil % : x  Auto Basophil % : x    03-22    139  |  107  |  13  ----------------------------<  102<H>  4.1   |  26  |  0.75    Ca    8.7      22 Mar 2022 07:16 HPI:  81 year old Male with PMHx of pancreatic cancer (dx 3/2021, chemo) s/p ERCP s/p whipple 3/2021 and 10/26/21, HTN, HLD, HFrEF (Oct 2021 EF: 35%), CAD s/p stents x2 (~15 years ago), Vtach with AICD (implanted 2005, extracted and re-implantation 9/2021 and 10/4/21), TAVR (4/2021), bilateral PE (7/2021) on Xarelto, spinal stenosis, macular degeneration, GERD, BPH, Left THR (x2 revisions), left femur fracture (hardware), osteomyelitis s/p R hallux amputation 9/2021, MSSA/VRE bacteremia seen bedside today for infected right 2nd digit ulcer. Denies any pain to the area and does not have any other pedal complaints. Denies any fever, chills, nausea, vomiting, chest pain, shortness of breath, or calf pain at this time.    PAST MEDICAL & SURGICAL HISTORY:  HTN (hypertension)    HLD (hyperlipidemia)    GERD (gastroesophageal reflux disease)    Cardiomyopathy    MSSA bacteremia  9/2021    Acute osteomyelitis    Pulmonary embolism    Pancreas cancer  chemo    [place] (hard of hearing)  B/L hearing aids    History of total hip replacement  left X 1, 2 revisions    History of laminectomy  X3    ICD (implantable cardiac defibrillator) in place  implanted 2005, replaced 10/4/2021 [employer] Subcutaneous ICD    Benign testicular tumor  radiation    Status post amputation of toe of right foot  8/2021    Status post fracture of femur  2017 left with hardware    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    H/O Whipple procedure  2/2021    Allergies    Dilaudid (Anaphylaxis; Hives)    FAMILY HISTORY:  Family history of stroke (Mother)    MEDICATIONS  (STANDING):  aluminum hydroxide/magnesium hydroxide/simethicone Suspension 30 milliLiter(s) Oral every 6 hours  aMIOdarone    Tablet 200 milliGRAM(s) Oral daily  brimonidine 0.2% Ophthalmic Solution 1 Drop(s) Both EYES two times a day  dextrose 40% Gel 15 Gram(s) Oral once  dextrose 5%. 1000 milliLiter(s) (50 mL/Hr) IV Continuous <Continuous>  dextrose 5%. 1000 milliLiter(s) (100 mL/Hr) IV Continuous <Continuous>  dextrose 50% Injectable 25 Gram(s) IV Push once  dextrose 50% Injectable 12.5 Gram(s) IV Push once  dextrose 50% Injectable 25 Gram(s) IV Push once  DULoxetine 60 milliGRAM(s) Oral daily  furosemide    Tablet 20 milliGRAM(s) Oral daily  glucagon  Injectable 1 milliGRAM(s) IntraMuscular once  insulin lispro (ADMELOG) corrective regimen sliding scale   SubCutaneous three times a day before meals  insulin lispro (ADMELOG) corrective regimen sliding scale   SubCutaneous at bedtime  latanoprost 0.005% Ophthalmic Solution 1 Drop(s) Both EYES at bedtime  metoprolol succinate ER 50 milliGRAM(s) Oral daily  pancrelipase  (CREON 36,000 Lipase Units) 3 Capsule(s) Oral three times a day with meals  pantoprazole    Tablet 40 milliGRAM(s) Oral before breakfast  piperacillin/tazobactam IVPB.. 3.375 Gram(s) IV Intermittent every 8 hours  rivaroxaban 20 milliGRAM(s) Oral with dinner  tamsulosin 0.4 milliGRAM(s) Oral at bedtime  timolol 0.5% Solution 1 Drop(s) Both EYES every 12 hours  traMADol 25 milliGRAM(s) Oral at bedtime    MEDICATIONS  (PRN):  acetaminophen     Tablet .. 650 milliGRAM(s) Oral every 6 hours PRN Temp greater or equal to 38C (100.4F), Mild Pain (1 - 3)  ALPRAZolam 0.25 milliGRAM(s) Oral at bedtime PRN Insomnia  ondansetron Injectable 4 milliGRAM(s) IV Push every 6 hours PRN Nausea and/or Vomiting  traMADol 25 milliGRAM(s) Oral three times a day PRN Moderate Pain (4 - 6)    Vital Signs Last 24 Hrs  T(C): 36.6 (22 Mar 2022 04:58), Max: 36.7 (21 Mar 2022 21:07)  T(F): 97.8 (22 Mar 2022 04:58), Max: 98.1 (21 Mar 2022 21:07)  HR: 70 (22 Mar 2022 04:58) (69 - 70)  BP: 118/71 (22 Mar 2022 04:58) (118/71 - 122/70)  BP(mean): --  RR: 17 (22 Mar 2022 04:58) (17 - 17)  SpO2: 95% (22 Mar 2022 04:58) (94% - 95%)    PHYSICAL EXAM:  Vascular: DP palpable b/l, PT non-palpable b/l, Capillary refill 3 seconds to remaining digits, Digital hair absent bilaterally, right forefoot non-pitting edema.  Neurological: Light touch sensation diminished bilaterally.  Musculoskeletal: s/p right partial ray amputation. Hammered digits b/l. AJ & STJ ROM intact.  Dermatological: 1cm x 1.2cm x 0.2cm ulceration noted to the right 2nd digit dorsal PIPJ, fibro:granular wound bed, (+) probe to bone, (+) periwound erythema, no malodor, proximal streaking has resolved, no fluctuance, serosanguinous drainage, no purulence. Epithelialized lesion on left 2nd digit dorsal PIPJ.    CBC Full  -  ( 22 Mar 2022 07:16 )  WBC Count : 3.20 K/uL  RBC Count : 2.68 M/uL  Hemoglobin : 8.4 g/dL  Hematocrit : 25.5 %  Platelet Count - Automated : 132 K/uL  Mean Cell Volume : 95.1 fl  Mean Cell Hemoglobin : 31.3 pg  Mean Cell Hemoglobin Concentration : 32.9 gm/dL  Auto Neutrophil # : x  Auto Lymphocyte # : x  Auto Monocyte # : x  Auto Eosinophil # : x  Auto Basophil # : x  Auto Neutrophil % : x  Auto Lymphocyte % : x  Auto Monocyte % : x  Auto Eosinophil % : x  Auto Basophil % : x    03-22    139  |  107  |  13  ----------------------------<  102<H>  4.1   |  26  |  0.75    Ca    8.7      22 Mar 2022 07:16

## 2022-03-22 NOTE — PROGRESS NOTE ADULT - PROBLEM SELECTOR PLAN 1
Pt seen and evaluated.  WBC 3.20 today, ESR 56, CRP 15, HA1c 5.8.  Right foot XR results reviewed- There is mild hammertoe deformity but likely of the second toe. No active bone destruction or fracture evident. Findings similar to October 28, 2021.  Bone scan negative for osteomyelitis.  ~  Per ID- Cont zosyn for now.  Pt can be weight bearing as tolerated in a surgical shoe and with a rolling walker.  [Plan for R 2nd digit amputation this week- pending bone scan results.  Requesting medical risk stratification and optimization.  Requesting cardiology risk stratification and optimization.]  Pt is high risk for sepsis, limb loss and death.  Pt will be followed by Podiatry while in house. Pt seen and evaluated.  WBC 3.20 today, ESR 56, CRP 15, HA1c 5.8.  Right foot XR results reviewed- There is mild hammertoe deformity but likely of the second toe. No active bone destruction or fracture evident. Findings similar to October 28, 2021.  Bone scan negative for osteomyelitis.  ~  Per ID- Cont zosyn for now.  Pt can be weight bearing as tolerated in a surgical shoe and with a rolling walker.  Plan for b/l 2nd digit arthroplasty to prevent recurrence of dorsal toe ulcers. Since pt is high risk, could lead to sepsis, limb loss and death.  Requesting medical risk stratification and optimization.  Requesting cardiology risk stratification and optimization.  Pt will be followed by Podiatry while in house. Pt seen and evaluated.  WBC 3.20 today, ESR 56, CRP 15, HA1c 5.8.  Right foot XR results reviewed- There is mild hammertoe deformity but likely of the second toe. No active bone destruction or fracture evident. Findings similar to October 28, 2021.  Bone scan negative for osteomyelitis.  ~  Per ID- Cont zosyn for now.  Pt can be weight bearing as tolerated in a surgical shoe and with a rolling walker.  Plan for b/l 2nd digit arthroplasty to prevent recurrence of dorsal toe ulcers. Due to the biomechanical deformities in the pt's feet and since pt is high risk, could lead to sepsis, limb loss and death if the cause of the recurring wounds is left untreated.  Requesting medical risk stratification and optimization.  Requesting cardiology risk stratification and optimization.  Pt will be followed by Podiatry while in house. Pt seen and evaluated.  WBC 3.20 today, ESR 56, CRP 15, HA1c 5.8.  Right foot XR results reviewed- There is mild hammertoe deformity but likely of the second toe. No active bone destruction or fracture evident. Findings similar to October 28, 2021.  Bone scan negative for osteomyelitis.  ~  Per ID- Cont zosyn for now.  Pt can be weight bearing as tolerated in a surgical shoe and with a rolling walker.  Plan for b/l 2nd digit arthroplasty to prevent recurrence of dorsal toe ulcers. Due to the biomechanical deformities in the pt's feet and since pt is high risk, could lead to sepsis, limb loss and death if the cause of the recurring wounds is left untreated.  Medical risk stratification and optimization appreciated.  Requesting cardiology risk stratification and optimization.  Pt will be followed by Podiatry while in house.

## 2022-03-22 NOTE — CONSULT NOTE ADULT - ATTENDING COMMENTS
Optimized for the OR from a cardiac point of view with no evidence of active ischemic heart disease, decompensated heart failure, severe obstructive valvular disease, or uncontrolled arrhythmia.  Can hold Xarelto temporarily.  High risk for VT post op, so try to continue amiodarone and bb.  To follow closely post op.
Patient evaluated at the bedside. R 2nd digit wound present. Patient has strong palpable DP pulses. ABIs were reviewed and are normal. No further intervention is needed. Cleared for podiatric intervention.
children/spouse

## 2022-03-22 NOTE — CONSULT NOTE ADULT - SUBJECTIVE AND OBJECTIVE BOX
Patient is a 81y old  Male who presents with a chief complaint of Diabetic foot ulcer (22 Mar 2022 13:35)    HPI:  80 year old male with PMH pancreatic cancer (dx 3/2021, chemo) s/p ERCP s/p whipple 3/2021 and 10/26/21, HTN, HLD, HFrEF (Oct 2021 EF: 35%), CAD s/p stents x2 (~15 years ago), Vtach with AICD (implanted 2005, extracted and re-implantation 9/2021 and 10/4/21), TAVR (4/2021), bilateral PE (7/2021) on Xarelto, spinal stenosis, macular degeneration, GERD, BPH, Left THR (x2 revisions), left femur fracture (hardware), Osteomyelitis s/p R hallux amputation 9/2021, MSSA/VRE bacteremia presenting with wound of RLE 2nd digit. Patient follows with wound care for open wound of abdominal wall (present since Whipple in Oct 2021) and was scheduled for hyperbaric O2 treatment tomorrow when he noticed wound. Patient went to wound care center today for evaluation, who recommended he come to ER. At baseline, he does not have sensation in b/l LE. Denies any inciting trauma to digit. Patient denies any fevers, chills, purulent discharge at home.    ED Course:   97.3, 127/82, 67, 16, 100% RA   WBC wnl, Hb 9.9, ESR 56  Given: vanc, zosyn, 1L NS bolus (17 Mar 2022 19:22)      PAST MEDICAL & SURGICAL HISTORY:  HTN (hypertension)    HLD (hyperlipidemia)    GERD (gastroesophageal reflux disease)    Cardiomyopathy    MSSA bacteremia  9/2021    Acute osteomyelitis    Pulmonary embolism    Pancreas cancer  chemo, s/p Whipple    [place] (hard of hearing)  B/L hearing aids    History of total hip replacement  left X 1, 2 revisions    History of laminectomy  X3    ICD (implantable cardiac defibrillator) in place  implanted 2005, replaced 10/4/2021 [place] [employer] Subcutaneous ICD    Benign testicular tumor  radiation    Status post amputation of toe of right foot  8/2021    Status post fracture of femur  2017 left with hardware    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    H/O Whipple procedure  2/2021, 10/2021      SOCIAL HISTORY:  No tobacco, Alcohol or Ddrug use      MEDICATIONS  (STANDING):  aluminum hydroxide/magnesium hydroxide/simethicone Suspension 30 milliLiter(s) Oral every 6 hours  aMIOdarone    Tablet 200 milliGRAM(s) Oral daily  brimonidine 0.2% Ophthalmic Solution 1 Drop(s) Both EYES two times a day  dextrose 40% Gel 15 Gram(s) Oral once  dextrose 5%. 1000 milliLiter(s) (50 mL/Hr) IV Continuous <Continuous>  dextrose 5%. 1000 milliLiter(s) (100 mL/Hr) IV Continuous <Continuous>  dextrose 50% Injectable 25 Gram(s) IV Push once  dextrose 50% Injectable 12.5 Gram(s) IV Push once  dextrose 50% Injectable 25 Gram(s) IV Push once  DULoxetine 60 milliGRAM(s) Oral daily  furosemide    Tablet 20 milliGRAM(s) Oral daily  glucagon  Injectable 1 milliGRAM(s) IntraMuscular once  insulin lispro (ADMELOG) corrective regimen sliding scale   SubCutaneous three times a day before meals  insulin lispro (ADMELOG) corrective regimen sliding scale   SubCutaneous at bedtime  latanoprost 0.005% Ophthalmic Solution 1 Drop(s) Both EYES at bedtime  metoprolol succinate ER 50 milliGRAM(s) Oral daily  pancrelipase  (CREON 36,000 Lipase Units) 3 Capsule(s) Oral three times a day with meals  pantoprazole    Tablet 40 milliGRAM(s) Oral before breakfast  piperacillin/tazobactam IVPB.. 3.375 Gram(s) IV Intermittent every 8 hours  rivaroxaban 20 milliGRAM(s) Oral with dinner  tamsulosin 0.4 milliGRAM(s) Oral at bedtime  timolol 0.5% Solution 1 Drop(s) Both EYES every 12 hours  traMADol 25 milliGRAM(s) Oral at bedtime    MEDICATIONS  (PRN):  acetaminophen     Tablet .. 650 milliGRAM(s) Oral every 6 hours PRN Temp greater or equal to 38C (100.4F), Mild Pain (1 - 3)  ALPRAZolam 0.25 milliGRAM(s) Oral at bedtime PRN Insomnia  ondansetron Injectable 4 milliGRAM(s) IV Push every 6 hours PRN Nausea and/or Vomiting  traMADol 25 milliGRAM(s) Oral three times a day PRN Moderate Pain (4 - 6)      FAMILY HISTORY:  Family history of stroke (Mother)  Denies Family history of CAD or early MI    ROS  Constitutional: denies fever, chills  HEENT: denies blurry vision, difficulty hearing  Respiratory: denies SOB, CHAUDHARY, cough  Cardiovascular: denies CP, palpitations, orthopnea, PND, LE edema  Gastrointestinal: denies nausea, vomiting, abdominal pain  Genitourinary: denies urinary changes  Skin: Denies rashes, itching  Neurologic: denies headache, weakness, dizziness  Hematology/Oncology: denies bleeding, easy bruising  ROS negative except as noted above      Vital Signs Last 24 Hrs  T(C): 36.8 (22 Mar 2022 12:03), Max: 36.8 (22 Mar 2022 12:03)  T(F): 98.2 (22 Mar 2022 12:03), Max: 98.2 (22 Mar 2022 12:03)  HR: 78 (22 Mar 2022 12:03) (69 - 78)  BP: 130/74 (22 Mar 2022 12:03) (118/71 - 130/74)  RR: 17 (22 Mar 2022 12:03) (17 - 17)  SpO2: 93% (22 Mar 2022 12:03) (93% - 95%)    Physical Exam:  General: Well developed, well nourished, NAD  HEENT: NCAT, PERRLA, EOMI bl, moist mucous membranes   Neck: Supple, nontender, no mass  Neurology: A&Ox3, nonfocal, sensation intact   Respiratory: CTA B/L, No W/R/R  CV: RRR, +S1/S2, no murmurs, rubs or gallops  Abdominal: Soft, NT, ND +BSx4, no palpable masses  Extremities: No C/C/E, + peripheral pulses  MSK: Normal ROM, no joint erythema or warmth, no joint swelling   Heme: No obvious ecchymosis or petechiae   Skin: warm, dry, normal color        21 Mar 2022 07:01  -  22 Mar 2022 07:00  --------------------------------------------------------  IN:  Total IN: 0 mL    OUT:    Voided (mL): 1550 mL  Total OUT: 1550 mL    Total NET: -1550 mL      LABS:                        8.4    3.20  )-----------( 132      ( 22 Mar 2022 07:16 )             25.5     03-22    139  |  107  |  13  ----------------------------<  102<H>  4.1   |  26  |  0.75    Ca    8.7      22 Mar 2022 07:16      I&O's Summary    21 Mar 2022 07:01  -  22 Mar 2022 07:00  --------------------------------------------------------  IN: 0 mL / OUT: 1550 mL / NET: -1550 mL     Patient is a 81y old  Male who presents with a chief complaint of Diabetic foot ulcer (22 Mar 2022 13:35)    HPI:  80 year old male with PMH pancreatic cancer (dx 3/2021, chemo) s/p ERCP s/p whipple 3/2021 and 10/26/21, HTN, HLD, HFrEF (Oct 2021 EF: 35%), CAD s/p stents x2 (~15 years ago), Vtach with AICD (implanted 2005, extracted and re-implantation 9/2021 and 10/4/21), TAVR (4/2021), bilateral PE (7/2021) on Xarelto, spinal stenosis, macular degeneration, GERD, BPH, Left THR (x2 revisions), left femur fracture (hardware), Osteomyelitis s/p R hallux amputation 9/2021, MSSA/VRE bacteremia presenting with wound of RLE 2nd digit. Patient follows with wound care for open wound of abdominal wall (present since Whipple in Oct 2021) and was scheduled for hyperbaric O2 treatment tomorrow when he noticed wound. Patient went to wound care center today for evaluation, who recommended he come to ER. At baseline, he does not have sensation in b/l LE. Denies any inciting trauma to digit. Patient denies any fevers, chills, purulent discharge at home.    ED Course:   97.3, 127/82, 67, 16, 100% RA   WBC wnl, Hb 9.9, ESR 56  Given: vanc, zosyn, 1L NS bolus (17 Mar 2022 19:22)      PAST MEDICAL & SURGICAL HISTORY:  HTN (hypertension)    HLD (hyperlipidemia)    GERD (gastroesophageal reflux disease)    Cardiomyopathy    MSSA bacteremia  9/2021    Acute osteomyelitis    Pulmonary embolism    Pancreas cancer  chemo, s/p Whipple    [place] (hard of hearing)  B/L hearing aids    History of total hip replacement  left X 1, 2 revisions    History of laminectomy  X3    ICD (implantable cardiac defibrillator) in place  implanted 2005, replaced 10/4/2021 [employer] Subcutaneous ICD    Benign testicular tumor  radiation    Status post amputation of toe of right foot  8/2021    Status post fracture of femur  2017 left with hardware    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    H/O Whipple procedure  2/2021, 10/2021      SOCIAL HISTORY:  No tobacco, Alcohol or Ddrug use      MEDICATIONS  (STANDING):  aluminum hydroxide/magnesium hydroxide/simethicone Suspension 30 milliLiter(s) Oral every 6 hours  aMIOdarone    Tablet 200 milliGRAM(s) Oral daily  brimonidine 0.2% Ophthalmic Solution 1 Drop(s) Both EYES two times a day  dextrose 40% Gel 15 Gram(s) Oral once  dextrose 5%. 1000 milliLiter(s) (50 mL/Hr) IV Continuous <Continuous>  dextrose 5%. 1000 milliLiter(s) (100 mL/Hr) IV Continuous <Continuous>  dextrose 50% Injectable 25 Gram(s) IV Push once  dextrose 50% Injectable 12.5 Gram(s) IV Push once  dextrose 50% Injectable 25 Gram(s) IV Push once  DULoxetine 60 milliGRAM(s) Oral daily  furosemide    Tablet 20 milliGRAM(s) Oral daily  glucagon  Injectable 1 milliGRAM(s) IntraMuscular once  insulin lispro (ADMELOG) corrective regimen sliding scale   SubCutaneous three times a day before meals  insulin lispro (ADMELOG) corrective regimen sliding scale   SubCutaneous at bedtime  latanoprost 0.005% Ophthalmic Solution 1 Drop(s) Both EYES at bedtime  metoprolol succinate ER 50 milliGRAM(s) Oral daily  pancrelipase  (CREON 36,000 Lipase Units) 3 Capsule(s) Oral three times a day with meals  pantoprazole    Tablet 40 milliGRAM(s) Oral before breakfast  piperacillin/tazobactam IVPB.. 3.375 Gram(s) IV Intermittent every 8 hours  rivaroxaban 20 milliGRAM(s) Oral with dinner  tamsulosin 0.4 milliGRAM(s) Oral at bedtime  timolol 0.5% Solution 1 Drop(s) Both EYES every 12 hours  traMADol 25 milliGRAM(s) Oral at bedtime    MEDICATIONS  (PRN):  acetaminophen     Tablet .. 650 milliGRAM(s) Oral every 6 hours PRN Temp greater or equal to 38C (100.4F), Mild Pain (1 - 3)  ALPRAZolam 0.25 milliGRAM(s) Oral at bedtime PRN Insomnia  ondansetron Injectable 4 milliGRAM(s) IV Push every 6 hours PRN Nausea and/or Vomiting  traMADol 25 milliGRAM(s) Oral three times a day PRN Moderate Pain (4 - 6)      FAMILY HISTORY:  Family history of stroke (Mother)  Denies Family history of CAD or early MI    ROS  Constitutional: denies fever  HEENT: denies blurry vision  Respiratory: denies SOB, CHAUDHARY, cough  Cardiovascular: denies CP, palpitations, worsening orthopnea, PND, LE edema.  Gastrointestinal: denies acute nausea, vomiting, abdominal pain  Genitourinary: denies urinary changes  Neurologic: denies headache, weakness, dizziness      Vital Signs Last 24 Hrs  T(C): 36.8 (22 Mar 2022 12:03), Max: 36.8 (22 Mar 2022 12:03)  T(F): 98.2 (22 Mar 2022 12:03), Max: 98.2 (22 Mar 2022 12:03)  HR: 78 (22 Mar 2022 12:03) (69 - 78)  BP: 130/74 (22 Mar 2022 12:03) (118/71 - 130/74)  RR: 17 (22 Mar 2022 12:03) (17 - 17)  SpO2: 93% (22 Mar 2022 12:03) (93% - 95%)    Physical Exam:  General: Well developed, NAD, breathing well at RA   HEENT: NCAT, PERRLA, EOMI bl, moist mucous membranes   Neck: Supple, nontender, no adenopathies  Neurology: A&Ox3, nonfocal, sensation intact   Respiratory: Clear b/l respiratory sounds   CV:  +S1/S2, RRR, no murmurs  Abdominal: Soft, NT, ND +BSx4  Extremities: No LE edema, no cyanosis. Partial amputation of 1st toe. 2nd toe covered. + peripheral pulses  MSK: Normal ROM, no joint erythema or warmth, no joint swelling   Heme: No obvious ecchymosis or petechiae   Skin: warm, dry, normal color        21 Mar 2022 07:01  -  22 Mar 2022 07:00  --------------------------------------------------------  IN:  Total IN: 0 mL    OUT:    Voided (mL): 1550 mL  Total OUT: 1550 mL    Total NET: -1550 mL      LABS:                        8.4    3.20  )-----------( 132      ( 22 Mar 2022 07:16 )             25.5     03-22    139  |  107  |  13  ----------------------------<  102<H>  4.1   |  26  |  0.75    Ca    8.7      22 Mar 2022 07:16      I&O's Summary    21 Mar 2022 07:01  -  22 Mar 2022 07:00  --------------------------------------------------------  IN: 0 mL / OUT: 1550 mL / NET: -1550 mL

## 2022-03-22 NOTE — PROGRESS NOTE ADULT - ASSESSMENT
Plan: b/l 2nd digit arthroplasty on Weds 3/23/22 at 5PM    ·	Medical risk stratification and optimization appreciated.  ·	Requesting cardiology risk stratification and optimization.  ·	Please obtain pre-op labs (CBC, BMP, type & screen, coags on AM of surgery)  ·	Please keep pt NPO starting at 8AM on 3/23/22 and start IV fluids  ·	Please hold anti-coags AM of surgery Plan: b/l 2nd digit arthroplasty on Weds 3/23/22 at 5PM    ·	Medical risk stratification and optimization appreciated.  ·	Requesting cardiology risk stratification and optimization.  ·	Please obtain pre-op labs (CBC, BMP, type & screen, coags on AM of surgery)  ·	Please keep pt NPO starting at 8AM on 3/23/22  ·	Xarelto held on 3/22/22

## 2022-03-22 NOTE — CONSULT NOTE ADULT - CONSULT REASON
Presurgical evaluation
Diabetic foot ulcer
Right 2nd digit infection
81y A1C with Estimated Average Glucose Result: 5.8 % (03-18-22 @ 11:28)   diabetes mellitus uncontrolled type 2
Wound Consult
Toe wound

## 2022-03-23 NOTE — DIETITIAN INITIAL EVALUATION ADULT. - PERTINENT LABORATORY DATA
( 3/23/2022) 138  104  110                     4.7    29    13/0.88 ca 9.1  ( 3/18/2022) A1c 5.8 ( avg )  ( 3/17/2022) CRP 15( H) alb 2.6 ( 3/23/2022) 138  104  110                     4.7    29    13/0.88 ca 9.1  ( 3/18/2022) A1c 5.8 ( avg )- excellent   ( 3/17/2022) CRP 15( H) alb 2.6 - c/w inflammation/stress

## 2022-03-23 NOTE — PROGRESS NOTE ADULT - SUBJECTIVE AND OBJECTIVE BOX
Patient is a 81y old  Male who presents with a chief complaint of Diabetic foot ulcer (23 Mar 2022 09:36)      INTERVAL HPI/OVERNIGHT EVENTS: Patient seen and examined at bedside. No overnight events occurred. Patient has no new complaints at this time.    MEDICATIONS  (STANDING):  aluminum hydroxide/magnesium hydroxide/simethicone Suspension 30 milliLiter(s) Oral every 6 hours  aMIOdarone    Tablet 200 milliGRAM(s) Oral daily  brimonidine 0.2% Ophthalmic Solution 1 Drop(s) Both EYES two times a day  dextrose 40% Gel 15 Gram(s) Oral once  dextrose 5%. 1000 milliLiter(s) (50 mL/Hr) IV Continuous <Continuous>  dextrose 5%. 1000 milliLiter(s) (100 mL/Hr) IV Continuous <Continuous>  dextrose 50% Injectable 25 Gram(s) IV Push once  dextrose 50% Injectable 12.5 Gram(s) IV Push once  dextrose 50% Injectable 25 Gram(s) IV Push once  DULoxetine 60 milliGRAM(s) Oral daily  furosemide    Tablet 20 milliGRAM(s) Oral daily  glucagon  Injectable 1 milliGRAM(s) IntraMuscular once  insulin lispro (ADMELOG) corrective regimen sliding scale   SubCutaneous three times a day before meals  insulin lispro (ADMELOG) corrective regimen sliding scale   SubCutaneous at bedtime  latanoprost 0.005% Ophthalmic Solution 1 Drop(s) Both EYES at bedtime  metoprolol succinate ER 50 milliGRAM(s) Oral daily  pancrelipase  (CREON 36,000 Lipase Units) 3 Capsule(s) Oral three times a day with meals  pantoprazole    Tablet 40 milliGRAM(s) Oral before breakfast  piperacillin/tazobactam IVPB.. 3.375 Gram(s) IV Intermittent every 8 hours  tamsulosin 0.4 milliGRAM(s) Oral at bedtime  timolol 0.5% Solution 1 Drop(s) Both EYES every 12 hours  traMADol 25 milliGRAM(s) Oral at bedtime    MEDICATIONS  (PRN):  acetaminophen     Tablet .. 650 milliGRAM(s) Oral every 6 hours PRN Temp greater or equal to 38C (100.4F), Mild Pain (1 - 3)  ALPRAZolam 0.25 milliGRAM(s) Oral at bedtime PRN Insomnia  ondansetron Injectable 4 milliGRAM(s) IV Push every 6 hours PRN Nausea and/or Vomiting  traMADol 25 milliGRAM(s) Oral three times a day PRN Moderate Pain (4 - 6)      Allergies    Dilaudid (Anaphylaxis; Hives)    Intolerances        REVIEW OF SYSTEMS:  CONSTITUTIONAL: No fever or chills  HEENT:  No headache, no sore throat  RESPIRATORY: No cough, wheezing, or shortness of breath  CARDIOVASCULAR: No chest pain, palpitations  GASTROINTESTINAL: No abd pain, nausea, vomiting, or diarrhea  GENITOURINARY: No dysuria, frequency, or hematuria  NEUROLOGICAL: no focal weakness or dizziness  MUSCULOSKELETAL: no myalgias     Vital Signs Last 24 Hrs  T(C): 36.4 (23 Mar 2022 05:16), Max: 36.8 (22 Mar 2022 12:03)  T(F): 97.6 (23 Mar 2022 05:16), Max: 98.2 (22 Mar 2022 12:03)  HR: 68 (23 Mar 2022 05:16) (67 - 78)  BP: 124/66 (23 Mar 2022 05:16) (116/69 - 130/74)  BP(mean): --  RR: 17 (23 Mar 2022 05:16) (17 - 17)  SpO2: 95% (23 Mar 2022 05:16) (93% - 95%)    PHYSICAL EXAM:  GENERAL: NAD  HEENT:  anicteric, moist mucous membranes  CHEST/LUNG:  CTA b/l, no rales, wheezes, or rhonchi  HEART:  RRR, S1, S2  ABDOMEN:  BS+, soft, nontender, nondistended  EXTREMITIES: no clubbing, cyanosis, or edema; R foot s/p hallux amputation, 2nd toe is a hammer toe,   dorsal mid-digit ulcer, pedal pulses present, b/l foot warm to touch, no erythema  NERVOUS SYSTEM: answers questions and follows commands appropriately    LABS:                        8.7    3.09  )-----------( 140      ( 23 Mar 2022 05:49 )             26.7     CBC Full  -  ( 23 Mar 2022 05:49 )  WBC Count : 3.09 K/uL  Hemoglobin : 8.7 g/dL  Hematocrit : 26.7 %  Platelet Count - Automated : 140 K/uL  Mean Cell Volume : 96.4 fl  Mean Cell Hemoglobin : 31.4 pg  Mean Cell Hemoglobin Concentration : 32.6 gm/dL  Auto Neutrophil # : x  Auto Lymphocyte # : x  Auto Monocyte # : x  Auto Eosinophil # : x  Auto Basophil # : x  Auto Neutrophil % : x  Auto Lymphocyte % : x  Auto Monocyte % : x  Auto Eosinophil % : x  Auto Basophil % : x    23 Mar 2022 05:49    138    |  104    |  13     ----------------------------<  110    4.7     |  29     |  0.88     Ca    9.1        23 Mar 2022 05:49  Phos  3.4       23 Mar 2022 05:49  Mg     2.2       23 Mar 2022 05:49          CAPILLARY BLOOD GLUCOSE      POCT Blood Glucose.: 103 mg/dL (23 Mar 2022 07:53)  POCT Blood Glucose.: 137 mg/dL (22 Mar 2022 21:22)  POCT Blood Glucose.: 118 mg/dL (22 Mar 2022 17:10)  POCT Blood Glucose.: 161 mg/dL (22 Mar 2022 11:31)        Culture - Other (collected 03-17-22 @ 23:28)  Source: .Other Right foot 2nd digit  Final Report (03-20-22 @ 16:48):    Moderate Citrobacter koseri    Few Klebsiella pneumoniae    Few Enterococcus faecalis    Normal skin davonte isolated  Organism: Citrobacter koseri  Klebsiella pneumoniae  Enterococcus faecalis (03-20-22 @ 16:48)  Organism: Enterococcus faecalis (03-20-22 @ 16:48)      -  Ampicillin: S <=2 Predicts results to ampicillin/sulbactam, amoxacillin-clavulanate and  piperacillin-tazobactam.      -  Tetra/Doxy: R >8      -  Vancomycin: S 1      Method Type: TYSON  Organism: Klebsiella pneumoniae (03-20-22 @ 16:48)      -  Amikacin: S <=16      -  Amoxicillin/Clavulanic Acid: S <=8/4      -  Ampicillin: R 16 These ampicillin results predict results for amoxicillin      -  Ampicillin/Sulbactam: S <=4/2 Enterobacter, Klebsiella aerogenes, Citrobacter, and Serratia may develop resistance during prolonged therapy (3-4 days)      -  Aztreonam: S <=4      -  Cefazolin: S <=2 Enterobacter, Klebsiella aerogenes, Citrobacter, and Serratia may develop resistance during prolonged therapy (3-4 days)      -  Cefepime: S <=2      -  Cefoxitin: S <=8      -  Ceftriaxone: S <=1 Enterobacter, Klebsiella aerogenes, Citrobacter, and Serratia may develop resistance during prolonged therapy      -  Ciprofloxacin: S <=0.25      -  Ertapenem: S <=0.5      -  Gentamicin: S <=2      -  Imipenem: S <=1      -  Levofloxacin: S <=0.5      -  Meropenem: S <=1      -  Piperacillin/Tazobactam: S <=8      -  Tobramycin: S <=2      -  Trimethoprim/Sulfamethoxazole: S <=0.5/9.5      Method Type: TYSON  Organism: Citrobacter koseri (03-20-22 @ 16:48)      -  Amikacin: S <=16      -  Amoxicillin/Clavulanic Acid: S <=8/4      -  Ampicillin: R >16 These ampicillin results predict results for amoxicillin      -  Ampicillin/Sulbactam: S <=4/2 Enterobacter, Klebsiella aerogenes, Citrobacter, and Serratia may develop resistance during prolonged therapy (3-4 days)      -  Aztreonam: S <=4      -  Cefazolin: S <=2 Enterobacter, Klebsiella aerogenes, Citrobacter, and Serratia may develop resistance during prolonged therapy (3-4 days)      -  Cefepime: S <=2      -  Cefotaxime: S <=2      -  Cefoxitin: S <=8      -  Ceftazidime: S <=1      -  Ceftriaxone: S <=1 Enterobacter, Klebsiella aerogenes, Citrobacter, and Serratia may develop resistance during prolonged therapy      -  Cefuroxime: S 8      -  Ciprofloxacin: S <=0.25      -  Ertapenem: S <=0.5      -  Gentamicin: S <=2      -  Imipenem: S <=1      -  Levofloxacin: S <=0.5      -  Meropenem: S <=1      -  Minocycline: S <=4      -  Piperacillin/Tazobactam: S <=8      -  Tigecycline: S <=2      -  Tobramycin: S <=2      -  Trimethoprim/Sulfamethoxazole: S <=0.5/9.5      Method Type: TYSON    Culture - Blood (collected 03-17-22 @ 22:01)  Source: .Blood Blood-Peripheral  Final Report (03-22-22 @ 23:00):    No Growth Final    Culture - Blood (collected 03-17-22 @ 22:01)  Source: .Blood Blood-Peripheral  Final Report (03-22-22 @ 23:00):    No Growth Final        RADIOLOGY & ADDITIONAL TESTS:    Personally reviewed.     Consultant(s) Notes Reviewed:  [x] YES  [ ] NO

## 2022-03-23 NOTE — BRIEF OPERATIVE NOTE - NSICDXBRIEFPOSTOP_GEN_ALL_CORE_FT
POST-OP DIAGNOSIS:  Hammertoe, bilateral 23-Mar-2022 19:58:23  Janeth Myrick  Chronic toe ulcer 23-Mar-2022 19:58:30  Janeth Myrick

## 2022-03-23 NOTE — PROGRESS NOTE ADULT - ASSESSMENT
80 year old male with PMH pancreatic cancer (dx 3/2021, chemo) s/p ERCP s/p whipple 3/2021 and 10/26/21, HTN, HLD, HFrEF (Oct 2021 EF: 35%), CAD s/p stents x2 (~15 years ago), Vtach with AICD (implanted 2005, extracted and re-implantation 9/2021 and 10/4/21), TAVR (4/2021), bilateral PE (7/2021) on Xarelto, spinal stenosis, macular degeneration, GERD, BPH, Left THR (x2 revisions), left femur fracture (hardware), Osteomyelitis s/p R hallux amputation 9/2021, MSSA/VRE bacteremia presenting with wound of RLE 2nd digit.  Planned for b/l 2nd digit arthroplasty today    HTN, HLD, HFrEF, CAD, Pre- optimization   - p/w +wound RLE 2nd digit, planned for amputation today   - EKG 03/17: Line NSR, probable APC. LBBB. No signs of acute ischemia, no anginal complaints  - h/o unstable VT: newly implanted [employer] ICD (10/4/2021), per [employer] (last interrogated 2/2022), Battery life: 96% remaining, with  recorded episode AFIB with RVR (11/1/2021) successfully terminated with ICD shock, no other events noted   - CORBIN 11/09/21: estimated EF 30%. Severe global LV systolic dysfunction. Mitral annular and mitral leaflets calcification. Mild MR. Moderate MS. Transcatheter aortic valve replacement.   - On furosemide 20 mg at home and metoprolol, continue same meds  - No signs of volume overload on exam, no symptoms 2/2 acute decompensation   - continue CCB, BB with hold parameters    - continue lasix   - on Xarelto at home, hold for surgical procedure, restart when able and cleared by vascular   - BP and HR controlled and stable   - continue routine hemodynamic   - Monitor and replete lytes, keep K>4, Mg>2.    - Patient has no modifiable active cardiac risk factors (Atherosclerotic disease, LV dysfunction, chronic valvular disease). He has no active ischemia, decompensated HF, unstable arrythmia, or severe stenotic valvular disease, and in the setting of podiatry intermediate risk procedure, patient is optimized from cardiovascular standpoint to proceed with planned surgery with routine hemodynamic monitoring.

## 2022-03-23 NOTE — PROGRESS NOTE ADULT - SUBJECTIVE AND OBJECTIVE BOX
[facility]  INFECTIOUS DISEASES   [address] [place] [address]  Tel: [phone]     Fax: [phone]  ======================================================  MD Clement Lira Kaushal, MD Cho, Michelle, MD   ======================================================    [id number]  JUANIS BUSTAMANTEANABELDIDIER     Follow up ; Foot ulcer and cellulitis    Swelling and erythema are better, no pain.   Bone scan negative.   No fever or any new complaint.   For amputation today.     PAST MEDICAL & SURGICAL HISTORY:  HTN (hypertension)  HLD (hyperlipidemia)  GERD (gastroesophageal reflux disease)  Cardiomyopathy  MSSA bacteremia  9/2021  Acute osteomyelitis  Pulmonary embolism  Pancreas cancer  chemo, s/p Whipple  [place] (hard of hearing)  B/L hearing aids  History of total hip replacement  left X 1, 2 revisions  History of laminectomyX3  ICD (implantable cardiac defibrillator) in place  implanted 2005, replaced 10/4/2021 [place] [employer] Subcutaneous ICD  Benign testicular tumor radiation  Status post amputation of toe of right foot8/2021  Status post fracture of femur  2017 left with hardware  S/P TAVR (transcatheter aortic valve replacement)7/2021  H/O Whipple procedure2/2021, 10/2021    Social Hx: no current smoking, ETOH or drugs     FAMILY HISTORY:  Family history of stroke (Mother)    Allergies  Dilaudid (Anaphylaxis; Hives)    Antibiotics:  zosyn     REVIEW OF SYSTEMS:  CONSTITUTIONAL:  No Fever or chills  HEENT:  No diplopia or blurred vision.  No sore throat or runny nose.  CARDIOVASCULAR:  No chest pain or SOB.  RESPIRATORY:  No cough, shortness of breath, PND or orthopnea.  GASTROINTESTINAL:  No nausea, vomiting or diarrhea.  GENITOURINARY:  No dysuria, frequency or urgency. No Blood in urine  MUSCULOSKELETAL:  no joint aches, no muscle pain  SKIN:  foot ulcer   NEUROLOGIC:  No paresthesias, fasciculations, seizures or weakness.  PSYCHIATRIC:  No disorder of thought or mood.  ENDOCRINE:  No heat or cold intolerance, polyuria or polydipsia.  HEMATOLOGICAL:  No easy bruising or bleeding.     Physical Exam:  Vital Signs Last 24 Hrs  T(C): 36.6 (21 Mar 2022 11:50), Max: 36.8 (20 Mar 2022 21:22)  T(F): 97.8 (21 Mar 2022 11:50), Max: 98.2 (20 Mar 2022 21:22)  HR: 61 (21 Mar 2022 11:50) (61 - 70)  BP: 106/63 (21 Mar 2022 11:50) (99/56 - 115/69)  BP(mean): --  RR: 17 (21 Mar 2022 11:50) (17 - 17)  SpO2: 98% (21 Mar 2022 11:50) (93% - 98%)  GEN: NAD, obese   HEENT: normocephalic and atraumatic. EOMI. PERRL.    NECK: Supple.  No lymphadenopathy  LUNGS: Clear to auscultation.  HEART: Regular rate and rhythm, Left lower chest AICD  ABDOMEN: Soft, nontender, and nondistended.   Open wound mid abdomen with serous discharge, no sign of infection, healing from edges    No sign of infection or cellulitis.   : No CVA tenderness  EXTREMITIES: R foot s/p hallux amputation, 2nd toe is a hammer toe,   dorsal mid-digit ulcer with bloody purulent discharge and open wound that probes to bone.   NEUROLOGIC: grossly intact.  PSYCHIATRIC: Appropriate affect .  SKIN: No rash    Labs:                        8.7    3.09  )-----------( 140      ( 23 Mar 2022 05:49 )             26.7     03-23    138  |  104  |  13  ----------------------------<  110<H>  4.7   |  29  |  0.88    Ca    9.1      23 Mar 2022 05:49  Phos  3.4     03-23  Mg     2.2     03-23    Culture - Other (collected 03-17-22 @ 23:28)  Source: .Other Right foot 2nd digit  Final Report (03-20-22 @ 16:48):    Moderate Citrobacter koseri    Few Klebsiella pneumoniae    Few Enterococcus faecalis    Normal skin davonte isolated  Organism: Citrobacter koseri  Klebsiella pneumoniae  Enterococcus faecalis (03-20-22 @ 16:48)  Organism: Enterococcus faecalis (03-20-22 @ 16:48)    Sensitivities:      -  Ampicillin: S <=2 Predicts results to ampicillin/sulbactam, amoxacillin-clavulanate and  piperacillin-tazobactam.      -  Tetra/Doxy: R >8      -  Vancomycin: S 1      Method Type: TYSON  Organism: Klebsiella pneumoniae (03-20-22 @ 16:48)    Sensitivities:      -  Amikacin: S <=16      -  Amoxicillin/Clavulanic Acid: S <=8/4      -  Ampicillin: R 16 These ampicillin results predict results for amoxicillin      -  Ampicillin/Sulbactam: S <=4/2 Enterobacter, Klebsiella aerogenes, Citrobacter, and Serratia may develop resistance during prolonged therapy (3-4 days)      -  Aztreonam: S <=4      -  Cefazolin: S <=2 Enterobacter, Klebsiella aerogenes, Citrobacter, and Serratia may develop resistance during prolonged therapy (3-4 days)      -  Cefepime: S <=2      -  Cefoxitin: S <=8      -  Ceftriaxone: S <=1 Enterobacter, Klebsiella aerogenes, Citrobacter, and Serratia may develop resistance during prolonged therapy      -  Ciprofloxacin: S <=0.25      -  Ertapenem: S <=0.5      -  Gentamicin: S <=2      -  Imipenem: S <=1      -  Levofloxacin: S <=0.5      -  Meropenem: S <=1      -  Piperacillin/Tazobactam: S <=8      -  Tobramycin: S <=2      -  Trimethoprim/Sulfamethoxazole: S <=0.5/9.5      Method Type: TYSON  Organism: Citrobacter koseri (03-20-22 @ 16:48)    Sensitivities:      -  Amikacin: S <=16      -  Amoxicillin/Clavulanic Acid: S <=8/4      -  Ampicillin: R >16 These ampicillin results predict results for amoxicillin      -  Ampicillin/Sulbactam: S <=4/2 Enterobacter, Klebsiella aerogenes, Citrobacter, and Serratia may develop resistance during prolonged therapy (3-4 days)      -  Aztreonam: S <=4      -  Cefazolin: S <=2 Enterobacter, Klebsiella aerogenes, Citrobacter, and Serratia may develop resistance during prolonged therapy (3-4 days)      -  Cefepime: S <=2      -  Cefotaxime: S <=2      -  Cefoxitin: S <=8      -  Ceftazidime: S <=1      -  Ceftriaxone: S <=1 Enterobacter, Klebsiella aerogenes, Citrobacter, and Serratia may develop resistance during prolonged therapy      -  Cefuroxime: S 8      -  Ciprofloxacin: S <=0.25      -  Ertapenem: S <=0.5      -  Gentamicin: S <=2      -  Imipenem: S <=1      -  Levofloxacin: S <=0.5      -  Meropenem: S <=1      -  Minocycline: S <=4      -  Piperacillin/Tazobactam: S <=8      -  Tigecycline: S <=2      -  Tobramycin: S <=2      -  Trimethoprim/Sulfamethoxazole: S <=0.5/9.5      Method Type: TYSON    Culture - Blood (collected 03-17-22 @ 22:01)  Source: .Blood Blood-Peripheral  Final Report (03-22-22 @ 23:00):    No Growth Final    Culture - Blood (collected 03-17-22 @ 22:01)  Source: .Blood Blood-Peripheral  Final Report (03-22-22 @ 23:00):    No Growth Final    WBC Count: 3.09 K/uL (03-23-22 @ 05:49)  WBC Count: 3.20 K/uL (03-22-22 @ 07:16)  WBC Count: 4.06 K/uL (03-21-22 @ 06:52)  WBC Count: 3.68 K/uL (03-20-22 @ 05:58)  WBC Count: 2.93 K/uL (03-19-22 @ 09:01)    Creatinine, Serum: 0.88 mg/dL (03-23-22 @ 05:49)  Creatinine, Serum: 0.75 mg/dL (03-22-22 @ 07:16)  Creatinine, Serum: 0.71 mg/dL (03-21-22 @ 06:52)  Creatinine, Serum: 0.73 mg/dL (03-20-22 @ 05:58)  Creatinine, Serum: 0.70 mg/dL (03-19-22 @ 09:01)    C-Reactive Protein, Serum: 15 mg/L (03-17-22 @ 21:53)    Sedimentation Rate, Erythrocyte: 56 mm/hr (03-17-22 @ 17:54)     COVID-19 PCR: NotDetec (03-23-22 @ 07:28)  COVID-19 PCR: NotDetec (03-17-22 @ 17:54)  COVID-19 PCR: NotDetec (03-16-22 @ 19:16)    All imaging and other data have been reviewed.  < from: Xray Foot AP + Lateral + Oblique, Right (03.17.22 @ 17:36) >  ACC: [id number] EXAM:  XR CHEST PORTABLE IMMED 1V                        ACC: [id number] EXAM:  XR FOOT COMP MIN 3 VIEWS RT                        PROCEDURE DATE:  03/17/2022    INTERPRETATION:  Right foot and chest. Patient has an infection ofthe   second toe.  Right foot. 3 views.  Again noted is a well-healed amputation of the distal first metatarsal.  There is mild hammertoe deformity but likely of the second toe. No active   bone destruction or fracture evident. Findings similarto October 28, 2021.  AP semierect chest on March 17, 2022 at 5:18 PM.  Elevated diaphragms crowds the chest.  Heart magnified by technique.  Left epicardial pacemaker and very extensive thoracolumbar hardware again   noted.  Persistent mild left base atelectasis.  Findings similar to CAT scan of March 7 of this year.  Temporary aortic valve again noted.  IMPRESSION: Stable right foot findings. Persistent minimal atelectasis   left base. Other findings as above.    Assessment and Plan:   79 yo man with PMH of HTN, HFrEF, CAD, Vtach with AICD, TAVR, PEs, Spinal stenosis, BPH and Left THR with revisions, pancreatic cancer (dx 3/2021, chemo) s/p ERCP s/p whipple 3/2021 and 10/26/21, and Osteomyelitis of R hallux s/p amputation 9/202 and MSSA bacteremia was admitted from wound center with R 2nd toe chronic wound and infection.   Last time hallux infection showed MSSA causing bacteremia and after surgery tissue culture was pseudomonas and staph Pettenkoferi (Oxacillin Sensitive).  ESR 56 and CRP 15   Xray with no bone involvement     He had pseudomonas and staph aureus and staph Pettenkoferi both Sensitive, in last admission and has citrobacter, Klebsiella and enterococcus this time.     R 2nd toe cellulitis and chronic wound   - Blood culture x 2 NGTD  - Wound culture with citrobacter, Klebsiella and enterococcus    - Bone scan negative for OM  - Podiatry and vascular are on the case   - Continue Zosyn 3.375gm q8h to cover old and new cultures  - As per podiatry plan for surgery and amputation today   - After surgery can continue ABx for few more days and then stop    Will follow.    Jaren Bell MD  Division of Infectious Diseases   Please call ID service at [phone] with any question.      35 minutes spent on total encounter assessing patient, examination, chart reivew, counseling and coordinating care by the attending physician/nurse/care manager.

## 2022-03-23 NOTE — CHART NOTE - NSCHARTNOTEFT_GEN_A_CORE
Planned procedure: Bilateral 2nd digit arthroplasties to correct rigid hammertoes causing chronic ulcerations  Scheduled for 5pm today 3/23/22 with Dr. Kumar.  Medical, cardiac and vascular clearances appreciated.

## 2022-03-23 NOTE — PROGRESS NOTE ADULT - SUBJECTIVE AND OBJECTIVE BOX
[facility] -- Milo Thomas, Binta Mata, Arian Gu, Zoltan Herrera: Office # [phone]    Follow Up:  pre-optimization, HTN, HLD, HFrEF, CAD,     Subjective/Observations: Patient seen and examined, awake, alert, resting comfortably in bed, denies chest pain, dyspnea, palpitations or dizziness, orthopnea and PND. Tolerating room air.     REVIEW OF SYSTEMS: All review of systems is negative for eye, ENT, GI, , allergic, dermatologic, musculoskeletal and neurologic except as described above    PAST MEDICAL & SURGICAL HISTORY:  HTN (hypertension)    HLD (hyperlipidemia)    GERD (gastroesophageal reflux disease)    Cardiomyopathy    MSSA bacteremia  9/2021    Acute osteomyelitis    Pulmonary embolism    Pancreas cancer  chemo, s/p Whipple    [place] (hard of hearing)  B/L hearing aids    History of total hip replacement  left X 1, 2 revisions    History of laminectomy  X3    ICD (implantable cardiac defibrillator) in place  implanted 2005, replaced 10/4/2021 [employer] Subcutaneous ICD    Benign testicular tumor  radiation    Status post amputation of toe of right foot  8/2021    Status post fracture of femur  2017 left with hardware    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    H/O Whipple procedure  2/2021, 10/2021        MEDICATIONS  (STANDING):  aluminum hydroxide/magnesium hydroxide/simethicone Suspension 30 milliLiter(s) Oral every 6 hours  aMIOdarone    Tablet 200 milliGRAM(s) Oral daily  brimonidine 0.2% Ophthalmic Solution 1 Drop(s) Both EYES two times a day  dextrose 40% Gel 15 Gram(s) Oral once  dextrose 5%. 1000 milliLiter(s) (50 mL/Hr) IV Continuous <Continuous>  dextrose 5%. 1000 milliLiter(s) (100 mL/Hr) IV Continuous <Continuous>  dextrose 50% Injectable 25 Gram(s) IV Push once  dextrose 50% Injectable 12.5 Gram(s) IV Push once  dextrose 50% Injectable 25 Gram(s) IV Push once  DULoxetine 60 milliGRAM(s) Oral daily  furosemide    Tablet 20 milliGRAM(s) Oral daily  glucagon  Injectable 1 milliGRAM(s) IntraMuscular once  insulin lispro (ADMELOG) corrective regimen sliding scale   SubCutaneous three times a day before meals  insulin lispro (ADMELOG) corrective regimen sliding scale   SubCutaneous at bedtime  latanoprost 0.005% Ophthalmic Solution 1 Drop(s) Both EYES at bedtime  metoprolol succinate ER 50 milliGRAM(s) Oral daily  pancrelipase  (CREON 36,000 Lipase Units) 3 Capsule(s) Oral three times a day with meals  pantoprazole    Tablet 40 milliGRAM(s) Oral before breakfast  piperacillin/tazobactam IVPB.. 3.375 Gram(s) IV Intermittent every 8 hours  tamsulosin 0.4 milliGRAM(s) Oral at bedtime  timolol 0.5% Solution 1 Drop(s) Both EYES every 12 hours  traMADol 25 milliGRAM(s) Oral at bedtime    MEDICATIONS  (PRN):  acetaminophen     Tablet .. 650 milliGRAM(s) Oral every 6 hours PRN Temp greater or equal to 38C (100.4F), Mild Pain (1 - 3)  ALPRAZolam 0.25 milliGRAM(s) Oral at bedtime PRN Insomnia  ondansetron Injectable 4 milliGRAM(s) IV Push every 6 hours PRN Nausea and/or Vomiting  traMADol 25 milliGRAM(s) Oral three times a day PRN Moderate Pain (4 - 6)    Allergies    Dilaudid (Anaphylaxis; Hives)    Intolerances      Vital Signs Last 24 Hrs  T(C): 36.4 (23 Mar 2022 05:16), Max: 36.8 (22 Mar 2022 12:03)  T(F): 97.6 (23 Mar 2022 05:16), Max: 98.2 (22 Mar 2022 12:03)  HR: 68 (23 Mar 2022 05:16) (67 - 78)  BP: 124/66 (23 Mar 2022 05:16) (116/69 - 130/74)  BP(mean): --  RR: 17 (23 Mar 2022 05:16) (17 - 17)  SpO2: 95% (23 Mar 2022 05:16) (93% - 95%)  I&O's Summary    22 Mar 2022 07:01  -  23 Mar 2022 07:00  --------------------------------------------------------  IN: 0 mL / OUT: 2700 mL / NET: -2700 mL          TELE: Not on telemetry   PHYSICAL EXAM:  Appearance: NAD, no distress, alert, well developed   HEENT: Moist Mucous Membranes, Anicteric  Cardiovascular: Regular rate and rhythm, Normal S1 S2, No JVD, No murmurs, No rubs, gallops or clicks  Respiratory: Non-labored, Clear to auscultation, No rales, No rhonchi, No wheezing.   Gastrointestinal:  Soft, Non-tender, + BS  Neurologic: Non-focal  Skin: Warm and dry, No visible rashes or ulcers, No ecchymosis, No cyanosis, partial amputated 1st and 2nd toe   Musculoskeletal: No clubbing, No cyanosis, No joint swelling/tenderness  Psychiatry: Mood & affect appropriate  Lymph: no peripheral edema.     LABS: All Labs Reviewed:                        8.7    3.09  )-----------( 140      ( 23 Mar 2022 05:49 )             26.7                         8.4    3.20  )-----------( 132      ( 22 Mar 2022 07:16 )             25.5                         8.4    4.06  )-----------( 145      ( 21 Mar 2022 06:52 )             25.9     23 Mar 2022 05:49    138    |  104    |  13     ----------------------------<  110    4.7     |  29     |  0.88   22 Mar 2022 07:16    139    |  107    |  13     ----------------------------<  102    4.1     |  26     |  0.75   21 Mar 2022 06:52    138    |  105    |  13     ----------------------------<  106    4.1     |  28     |  0.71     Ca    9.1        23 Mar 2022 05:49  Ca    8.7        22 Mar 2022 07:16  Ca    8.5        21 Mar 2022 06:52  Phos  3.4       23 Mar 2022 05:49  Mg     2.2       23 Mar 2022 05:49                      12 Lead ECG:   Ventricular Rate 66 BPM    Atrial Rate 66 BPM    P-R Interval 146 ms    QRS Duration 126 ms    Q-T Interval 448 ms    QTC Calculation(Bazett) 469 ms    P Axis 88 degrees    R Axis 20 degrees    T Axis 67 degrees    Diagnosis Line Normal sinus rhythm  probable APC  Left bundle branch block  Abnormal ECG  Confirmed by Bigg Mata MD ([id number]) on 3/18/2022 1:04:57 PM (03-17-22 @ 16:41)    < from: Xray Chest 1 View-PORTABLE IMMEDIATE (03.17.22 @ 17:37) >    ACC: [id number] EXAM:  XR CHEST PORTABLE IMMED 1V                        ACC: [id number] EXAM:  XR FOOT COMP MIN 3 VIEWS RT                          PROCEDURE DATE:  03/17/2022          INTERPRETATION:  Right foot and chest. Patient has an infection ofthe   second toe.    Right foot. 3 views.    Again noted is a well-healed amputation of the distal first metatarsal.    There is mild hammertoe deformity but likely of the second toe. No active   bone destruction or fracture evident. Findings similarto October 28, 2021.    AP semierect chest on March 17, 2022 at 5:18 PM.    Elevated diaphragms crowds the chest.    Heart magnified by technique.    Left epicardial pacemaker and very extensive thoracolumbar hardware again   noted.    Persistent mild left base atelectasis.    Findings similar to CAT scan of March 7 of this year.    Temporary aortic valve again noted.    IMPRESSION: Stable right foot findings. Persistent minimal atelectasis   left base. Other findings as above.    --- End of Report ---            JODI GURROLA MD; Attending Radiologist  This document has been electronically signed. Mar 18 2022 10:50AM    < end of copied text >  < from: Transesophageal Echocardiogram w/o TTE (11.09.21 @ 11:35) >    Patient name: JUANIS DE SANTIAGO  YOB: 1940   Age: 80 (M)   MR#: [id number]  Study Date: 11/9/2021  Location: [address] [place], [place] [id number]: Jimbo Dawson M.D.  Study quality: Technically good  Referring Physician: Fady Mercer MD  Blood Pressure: 114/60 mmHg  Height: 188 cm  Weight: 95 kg  BSA: 2.2 m2  Heart Rate: 73 mmHg  ------------------------------------------------------------------------  PROCEDURE: Transesophageal (CORBIN) was performed.  Informed  consent was first obtained for CORBIN. The patient was sedated  - see anesthesia record.  The procedure was monitored with  automatic blood pressure monitoring, ECG tracings and pulse  oximetry. The transesophageal probe was placed in the  esophagus posterior to the heart without complications.  Real-time and reconstructed 3-dimensional imaging was  performed with Workstation. Color Doppler analysis was  carried out using both 2D and 3D mapping.  INDICATION: Bacteremia (R78.81), Presence of prosthetic  heart valve (Z95.2)  ------------------------------------------------------------------------  Dimensions:    Normal Values:  LA:            2.0 - 4.0 cm  Ao:            2.0 - 3.8 cm  SEPTUM:        0.6 - 1.2 cm  PWT:           0.6 - 1.1 cm  LVIDd:         3.0 - 5.6 cm  LVIDs:         1.8 - 4.0 cm  EF (Visual Estimate): 30 %  Doppler Peak Velocity (m/sec): AoV=1.9  ------------------------------------------------------------------------  Observations:  Mitral Valve: Mitral annular calcification and heavily  calcified mitral leaflets with decreased diastolic opening.   No independenlty mobile echogenic massess, however unable  to definatively rule out vegetations. Consider serial  imaging.  Mild mitral regurgitation.  Peak mitral valve  gradient equals 10 mm Hg, mean transmitral valve gradient  equals 5 mm Hg, estimated mitral valve area equals 1.5 sqcm  (by 3D plainimetry), consistent with moderate mitral  stenosis.  Aortic Valve/Aorta: Transcatheter aortic valve replacement.   The valve is well seated. No vegetations seen on the TAVR.   Peak transaortic valve gradient equals 14 mm Hg, which is  probably normal in the presence of a transcatheter aortic  valve replacement. Minimal aortic transvalvular  regurgitation.  No aortic paravalvular regurgitation seen.  Peak left ventricular outflow tract gradient equals 1 mm  Hg.  Moderate non-mobile atheroma noted in aortic  arch/descending aorta.  Left Atrium: Normal left atrium.  Left Ventricle: Severe global left ventricular systolic  dysfunction.  Right Heart: Normal right atrium. The right ventricle is  not well visualized; grossly normal right ventricular  systolic function. There is prolaspe of the posterior  tricuspid valve leaflet. Moderate eccentric tricuspid  regurgitation. Normal pulmonic valve.Minimal pulmonic  regurgitation.  Pericardium/Pleura: Normal pericardium with no pericardial  effusion.  Hemodynamic: Estimated right atrial pressure is 8 mm Hg.  ------------------------------------------------------------------------  Conclusions:  1. Mitral annular calcification and heavily calcified  mitral leaflets with decreased diastolic opening.  No  independenlty mobile echogenic massess, however unable to  definatively rule out vegetations. Consider serial imaging.   Mild mitral regurgitation.  Peak mitral valve gradient  equals 10 mm Hg, mean transmitral valve gradient equals 5  mm Hg, estimated mitral valve area equals 1.5 sqcm (by 3D  plainimetry), consistent with moderate mitral stenosis.  2. Transcatheter aortic valve replacement.  The valve is  well seated. No vegetations seen on the TAVR.  Minimal  aortic transvalvular regurgitation.  No aortic paravalvular  regurgitation seen.  3. Severe global left ventricular systolic dysfunction.  4. The right ventricle is not well visualized; grossly  normal right ventricular systolic function.  *** Compared with echocardiogram of 10/27/2021 and CORBIN of  9/27/2021, no significant changes noted.  ------------------------------------------------------------------------  Confirmed on 11/9/2021 - 12:56:45 by Juan Pierre M.D.  ------------------------------------------------------------------------    < end of copied text >

## 2022-03-23 NOTE — DIETITIAN INITIAL EVALUATION ADULT. - ORAL INTAKE PTA/DIET HISTORY
pending. Pt sleeping soundly at time of visit; name called x3 pending. Pt sleeping soundly at time of visit; name called x3  (Pt due to go to OR today for amputation of R second toe)

## 2022-03-23 NOTE — DIETITIAN INITIAL EVALUATION ADULT. - PROBLEM SELECTOR PLAN 1
Sent by wound care for ulceration of RLE 2nd digit  Sep 2021 episode of osteomyelitis of R hallux s/p amputation   Afebrile, no leukocytosis, Lactate wnl, elevated ESR  Start empiric abx coverage with vancomycin, cefepime  Tylenol PRN for fevers  f/u CRP  f/u blood cultures  Check bone scan - patient cannot have MRI due to AICD   f/u RLE xrays  WBAT  Surgical intervention pending results of above  Podiatry consulted, f/u recs  ID Dr Bell consulted, appreciate recs  Vascular consulted, appreciate recs

## 2022-03-23 NOTE — BRIEF OPERATIVE NOTE - NSICDXBRIEFPREOP_GEN_ALL_CORE_FT
PRE-OP DIAGNOSIS:  Hammertoe, bilateral 23-Mar-2022 19:57:56  Janeth Myrick  Chronic toe ulcer 23-Mar-2022 19:58:10  Janeth Myrick

## 2022-03-23 NOTE — PROGRESS NOTE ADULT - ASSESSMENT
80 year old male with PMH pancreatic cancer (dx 3/2021, chemo) s/p ERCP s/p whipple 3/2021 and 10/26/21, HTN, HLD, HFrEF (Oct 2021 EF: 35%), CAD s/p stents x2 (~15 years ago), Vtach with AICD (implanted 2005, extracted and re-implantation 9/2021 and 10/4/21), TAVR (4/2021), bilateral PE (7/2021) on Xarelto, spinal stenosis, macular degeneration, GERD, BPH, Left THR (x2 revisions), left femur fracture (hardware), Osteomyelitis s/p R hallux amputation 9/2021, MSSA/VRE bacteremia admitted for infected wound of RLE 2nd digit.      Problem/Plan - 1:  ·  Problem: Diabetic toe ulcer.   ·  Plan: Sent by wound care for ulceration of RLE 2nd digit  PMHx: Sep 2021 episode of osteomyelitis of R hallux s/p amputation   Elevated , CRP 15  Patient now off vanco and cefepime, changed to zosyn IV  (possible dc on augmentin per previous discussion with ID - from chart review)  NM scan shows no signs of osteomyelitis; however, as per care d/w Podiatry today, risk of limb loss if underlying condition is untreated is high, and early osteo may not have been caught by WBC scan, so surgical intervention may be necessary, and right 2nd digit amputation is planned for today, 3/23. ( patient cannot have MRI due to AICD )  F/u cardio note for cardiac clearance  3/17 blood cx show NGTD, 3/17 tissue cx with klebsiella, citrobacter, and enterococcous  RLE xray shows no bone destruction or fracture, s/p amputation of distal 1st metatarsal  WBAT  Surgical intervention pending results of above  Podiatry consulted, f/u recs  ID Dr Bell consulted, appreciate recs; as per d/w ID, wound care consult called, and recs reviewed  Vascular consulted, appreciate recs - no indication for surgical intervention at this time  Pain control with tylenol for mild pain, tramadol added for moderate pain, given regularly at bedtime     Problem/Plan - 2:  ·  Problem: Chronic HFrEF (heart failure with reduced ejection fraction).   ·  Plan: Oct 2021 EF 35% with global LV dysfunction  Continue home lasix   Continue home metoprolol  CXR with some atelectasis, no PNA, consolidation, or effusions  Patient has AICD - not MRI compatible  Apprec cardio consult re optimization.     Problem/Plan - 3:  ·  Problem: Pulmonary embolism.   ·  Plan: July 2021 b/l PEs  Xarelto held since 3/21. (plan to resume post surgery) - if surgery deferred for whatever reason, will keep on heparin SC in mean time     Problem/Plan - 4:  ·  Problem: T2DM (type 2 diabetes mellitus).   ·  Plan: HbA1c at 5.8, controlled  Start insulin corrective scale while inpatient  Routine glycemic monitoring.     Problem/Plan - 5:  ·  Problem: Ventricular tachyarrhythmia.   ·  Plan: Hx of Vtach during previous admission at [facility] for Whipple  Has AICD in place (reimplanted Oct 2021 due to bacteremia)  Continue home amiodarone.     Problem/Plan - 6:  ·  Problem: Pancreatic cancer.   ·  Plan: s/p Whipple x2, most recent Oct 2021  Continue home pancrelipase  Abdominal wound in midline, receives hyperbaric O2 therapy  Wound care consulted, appreciate recs  On maalox for abdominal bloating     Problem/Plan - 7:  ·  Problem: Macular degeneration.   ·  Plan: Continue home regimen: brimonidine, timolol, latonoprost.     Problem/Plan - 8:  ·  Problem: BPH (benign prostatic hyperplasia).   ·  Plan: Continue home flomax.     Problem/Plan - 9:  ·  Problem: GERD (gastroesophageal reflux disease).   ·  Plan: Continue home protonix.     Problem/Plan - 10:  ·  Problem: Need for prophylactic measure.   ·  Plan; DVT ppx: xarelto _ HELD for surgery.

## 2022-03-23 NOTE — DIETITIAN INITIAL EVALUATION ADULT. - PROBLEM SELECTOR PLAN 2
Oct 2021 EF 35% with global LV dysfunction  Continue home lasix   Continue home metoprolol  f/u CXR  Patient has AICD - not MRI compatible  apprec cardio consult and poss optimization

## 2022-03-23 NOTE — DIETITIAN INITIAL EVALUATION ADULT. - PROBLEM SELECTOR PLAN 5
Hx of Vtach during previous admission at [facility] for Whipple  Has AICD in place (reimplanted Oct 2021 due to bacteremia)  Continue home amiodarone

## 2022-03-23 NOTE — BRIEF OPERATIVE NOTE - OPERATION/FINDINGS
Chronic ulceration to right 2nd digit dorsal PIPJ  Epithelialized lesion at left 2nd digit dorsal PIPJ

## 2022-03-24 NOTE — PROGRESS NOTE ADULT - SUBJECTIVE AND OBJECTIVE BOX
[facility] -- Milo Thomas, Adolfo, Binta, Arian Gu, Zoltan Herrera: Office # [phone]    Follow Up:   pre-optimization, HTN, HLD, HFrEF, CAD,     Subjective/Observations: Patient seen and examined, awake, alert, resting comfortably in bed, denies chest pain, dyspnea, palpitations or dizziness, orthopnea and PND. Tolerating room air.   REVIEW OF SYSTEMS: All review of systems is negative for eye, ENT, GI, , allergic, dermatologic, musculoskeletal and neurologic except as described above    PAST MEDICAL & SURGICAL HISTORY:  HTN (hypertension)    HLD (hyperlipidemia)    GERD (gastroesophageal reflux disease)    Cardiomyopathy    MSSA bacteremia  9/2021    Acute osteomyelitis    Pulmonary embolism    Pancreas cancer  chemo, s/p Whipple    [place] (hard of hearing)  B/L hearing aids    History of total hip replacement  left X 1, 2 revisions    History of laminectomy  X3    ICD (implantable cardiac defibrillator) in place  implanted 2005, replaced 10/4/2021 [place] WIB Subcutaneous ICD    Benign testicular tumor  radiation    Status post amputation of toe of right foot  8/2021    Status post fracture of femur  2017 left with hardware    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    H/O Whipple procedure  2/2021, 10/2021        MEDICATIONS  (STANDING):  aluminum hydroxide/magnesium hydroxide/simethicone Suspension 30 milliLiter(s) Oral every 6 hours  aMIOdarone    Tablet 200 milliGRAM(s) Oral daily  ascorbic acid 500 milliGRAM(s) Oral daily  brimonidine 0.2% Ophthalmic Solution 1 Drop(s) Both EYES two times a day  dextrose 40% Gel 15 Gram(s) Oral once  dextrose 5% + sodium chloride 0.9%. 1000 milliLiter(s) (25 mL/Hr) IV Continuous <Continuous>  dextrose 50% Injectable 25 Gram(s) IV Push once  dextrose 50% Injectable 12.5 Gram(s) IV Push once  dextrose 50% Injectable 25 Gram(s) IV Push once  DULoxetine 60 milliGRAM(s) Oral daily  furosemide    Tablet 20 milliGRAM(s) Oral daily  glucagon  Injectable 1 milliGRAM(s) IntraMuscular once  insulin lispro (ADMELOG) corrective regimen sliding scale   SubCutaneous three times a day before meals  insulin lispro (ADMELOG) corrective regimen sliding scale   SubCutaneous at bedtime  latanoprost 0.005% Ophthalmic Solution 1 Drop(s) Both EYES at bedtime  metoprolol succinate ER 50 milliGRAM(s) Oral daily  multivitamin/minerals 1 Tablet(s) Oral daily  pancrelipase  (CREON 36,000 Lipase Units) 3 Capsule(s) Oral three times a day with meals  pantoprazole    Tablet 40 milliGRAM(s) Oral before breakfast  rivaroxaban 20 milliGRAM(s) Oral with dinner  tamsulosin 0.4 milliGRAM(s) Oral at bedtime  timolol 0.5% Solution 1 Drop(s) Both EYES every 12 hours  traMADol 25 milliGRAM(s) Oral at bedtime    MEDICATIONS  (PRN):  acetaminophen     Tablet .. 650 milliGRAM(s) Oral every 6 hours PRN Temp greater or equal to 38C (100.4F), Mild Pain (1 - 3)  ALPRAZolam 0.25 milliGRAM(s) Oral at bedtime PRN Insomnia  ondansetron Injectable 4 milliGRAM(s) IV Push every 6 hours PRN Nausea and/or Vomiting  traMADol 25 milliGRAM(s) Oral three times a day PRN Moderate Pain (4 - 6)    Allergies    Dilaudid (Anaphylaxis; Hives)    Intolerances      Vital Signs Last 24 Hrs  T(C): 36.3 (24 Mar 2022 12:58), Max: 36.9 (23 Mar 2022 21:33)  T(F): 97.4 (24 Mar 2022 12:58), Max: 98.4 (23 Mar 2022 21:33)  HR: 61 (24 Mar 2022 12:58) (61 - 69)  BP: 121/71 (24 Mar 2022 12:58) (115/70 - 134/67)  BP(mean): --  RR: 17 (24 Mar 2022 12:58) (14 - 18)  SpO2: 95% (24 Mar 2022 12:58) (95% - 100%)  I&O's Summary    23 Mar 2022 07:01  -  24 Mar 2022 07:00  --------------------------------------------------------  IN: 630 mL / OUT: 2450 mL / NET: -1820 mL          TELE: Not on telemetry   PHYSICAL EXAM:  Appearance: NAD, no distress, alert, well developed   HEENT: Moist Mucous Membranes, Anicteric  Cardiovascular: Regular rate and rhythm, Normal S1 S2, No JVD, No murmurs, No rubs, gallops or clicks  Respiratory: Non-labored, Clear to auscultation, No rales, No rhonchi, No wheezing.   Gastrointestinal:  Soft, Non-tender, + BS  Neurologic: Non-focal  Skin: Warm and dry, No visible rashes or ulcers, No ecchymosis, No cyanosis, b/l foot dressing CDI   Musculoskeletal: No clubbing, No cyanosis, No joint swelling/tenderness  Psychiatry: Mood & affect appropriate  Lymph: No peripheral edema.     LABS: All Labs Reviewed:                        9.7    3.70  )-----------( 155      ( 24 Mar 2022 07:47 )             29.7                         8.7    3.09  )-----------( 140      ( 23 Mar 2022 05:49 )             26.7                         8.4    3.20  )-----------( 132      ( 22 Mar 2022 07:16 )             25.5     24 Mar 2022 07:47    137    |  104    |  12     ----------------------------<  103    4.6     |  29     |  0.92   23 Mar 2022 05:49    138    |  104    |  13     ----------------------------<  110    4.7     |  29     |  0.88   22 Mar 2022 07:16    139    |  107    |  13     ----------------------------<  102    4.1     |  26     |  0.75     Ca    9.1        24 Mar 2022 07:47  Ca    9.1        23 Mar 2022 05:49  Ca    8.7        22 Mar 2022 07:16  Phos  3.4       23 Mar 2022 05:49  Mg     2.2       23 Mar 2022 05:49                      12 Lead ECG:   Ventricular Rate 66 BPM    Atrial Rate 66 BPM    P-R Interval 146 ms    QRS Duration 126 ms    Q-T Interval 448 ms    QTC Calculation(Bazett) 469 ms    P Axis 88 degrees    R Axis 20 degrees    T Axis 67 degrees    Diagnosis Line Normal sinus rhythm  probable APC  Left bundle branch block  Abnormal ECG  Confirmed by Bigg Mata MD ([id number]) on 3/18/2022 1:04:57 PM (03-17-22 @ 16:41)    < from: Xray Chest 1 View-PORTABLE IMMEDIATE (03.17.22 @ 17:37) >    ACC: [id number] EXAM:  XR CHEST PORTABLE IMMED 1V                        ACC: [id number] EXAM:  XR FOOT COMP MIN 3 VIEWS RT                          PROCEDURE DATE:  03/17/2022          INTERPRETATION:  Right foot and chest. Patient has an infection ofthe   second toe.    Right foot. 3 views.    Again noted is a well-healed amputation of the distal first metatarsal.    There is mild hammertoe deformity but likely of the second toe. No active   bone destruction or fracture evident. Findings similarto October 28, 2021.    AP semierect chest on March 17, 2022 at 5:18 PM.    Elevated diaphragms crowds the chest.    Heart magnified by technique.    Left epicardial pacemaker and very extensive thoracolumbar hardware again   noted.    Persistent mild left base atelectasis.    Findings similar to CAT scan of March 7 of this year.    Temporary aortic valve again noted.    IMPRESSION: Stable right foot findings. Persistent minimal atelectasis   left base. Other findings as above.    --- End of Report ---            JODI GURROLA MD; Attending Radiologist  This document has been electronically signed. Mar 18 2022 10:50AM    < end of copied text >  < from: Transesophageal Echocardiogram w/o TTE (11.09.21 @ 11:35) >    Patient name: JUANIS DE SANTIAGO  YOB: 1940   Age: 80 (M)   MR#: [id number]  Study Date: 11/9/2021  Location: [address] [place], [place] [id number]: Jimbo Dawson M.D.  Study quality: Technically good  Referring Physician: Fady Mercer MD  Blood Pressure: 114/60 mmHg  Height: 188 cm  Weight: 95 kg  BSA: 2.2 m2  Heart Rate: 73 mmHg  ------------------------------------------------------------------------  PROCEDURE: Transesophageal (CORBIN) was performed.  Informed  consent was first obtained for CORBIN. The patient was sedated  - see anesthesia record.  The procedure was monitored with  automatic blood pressure monitoring, ECG tracings and pulse  oximetry. The transesophageal probe was placed in the  esophagus posterior to the heart without complications.  Real-time and reconstructed 3-dimensional imaging was  performed with Workstation. Color Doppler analysis was  carried out using both 2D and 3D mapping.  INDICATION: Bacteremia (R78.81), Presence of prosthetic  heart valve (Z95.2)  ------------------------------------------------------------------------  Dimensions:    Normal Values:  LA:            2.0 - 4.0 cm  Ao:            2.0 - 3.8 cm  SEPTUM:        0.6 - 1.2 cm  PWT:           0.6 - 1.1 cm  LVIDd:         3.0 - 5.6 cm  LVIDs:         1.8 - 4.0 cm  EF (Visual Estimate): 30 %  Doppler Peak Velocity (m/sec): AoV=1.9  ------------------------------------------------------------------------  Observations:  Mitral Valve: Mitral annular calcification and heavily  calcified mitral leaflets with decreased diastolic opening.   No independenlty mobile echogenic massess, however unable  to definatively rule out vegetations. Consider serial  imaging.  Mild mitral regurgitation.  Peak mitral valve  gradient equals 10 mm Hg, mean transmitral valve gradient  equals 5 mm Hg, estimated mitral valve area equals 1.5 sqcm  (by 3D plainimetry), consistent with moderate mitral  stenosis.  Aortic Valve/Aorta: Transcatheter aortic valve replacement.   The valve is well seated. No vegetations seen on the TAVR.   Peak transaortic valve gradient equals 14 mm Hg, which is  probably normal in the presence of a transcatheter aortic  valve replacement. Minimal aortic transvalvular  regurgitation.  No aortic paravalvular regurgitation seen.  Peak left ventricular outflow tract gradient equals 1 mm  Hg.  Moderate non-mobile atheroma noted in aortic  arch/descending aorta.  Left Atrium: Normal left atrium.  Left Ventricle: Severe global left ventricular systolic  dysfunction.  Right Heart: Normal right atrium. The right ventricle is  not well visualized; grossly normal right ventricular  systolic function. There is prolaspe of the posterior  tricuspid valve leaflet. Moderate eccentric tricuspid  regurgitation. Normal pulmonic valve.Minimal pulmonic  regurgitation.  Pericardium/Pleura: Normal pericardium with no pericardial  effusion.  Hemodynamic: Estimated right atrial pressure is 8 mm Hg.  ------------------------------------------------------------------------  Conclusions:  1. Mitral annular calcification and heavily calcified  mitral leaflets with decreased diastolic opening.  No  independenlty mobile echogenic massess, however unable to  definatively rule out vegetations. Consider serial imaging.   Mild mitral regurgitation.  Peak mitral valve gradient  equals 10 mm Hg, mean transmitral valve gradient equals 5  mm Hg, estimated mitral valve area equals 1.5 sqcm (by 3D  plainimetry), consistent with moderate mitral stenosis.  2. Transcatheter aortic valve replacement.  The valve is  well seated. No vegetations seen on the TAVR.  Minimal  aortic transvalvular regurgitation.  No aortic paravalvular  regurgitation seen.  3. Severe global left ventricular systolic dysfunction.  4. The right ventricle is not well visualized; grossly  normal right ventricular systolic function.  *** Compared with echocardiogram of 10/27/2021 and CORBIN of  9/27/2021, no significant changes noted.  ------------------------------------------------------------------------  Confirmed on 11/9/2021 - 12:56:45 by Juan Pierre M.D.  ------------------------------------------------------------------------    < end of copied text >

## 2022-03-24 NOTE — PROGRESS NOTE ADULT - SUBJECTIVE AND OBJECTIVE BOX
[facility]  INFECTIOUS DISEASES   [address] [place] [address]  Tel: [phone]     Fax: [phone]  ======================================================  MD Clement Lira Kaushal, MD Cho, Michelle, MD   ======================================================    MRN-[id number]  JUANIS DE SANTIAGO     Follow up ; Foot ulcer and cellulitis    Had 2nd toe arthroplasty bilaterally on 3/23.   Bone scan negative.   No fever or any new complaint.   No pain.     PAST MEDICAL & SURGICAL HISTORY:  HTN (hypertension)  HLD (hyperlipidemia)  GERD (gastroesophageal reflux disease)  Cardiomyopathy  MSSA bacteremia  9/2021  Acute osteomyelitis  Pulmonary embolism  Pancreas cancer  chemo, s/p Whipple  [place] [place] (hard of hearing)  B/L hearing aids  History of total hip replacement  left X 1, 2 revisions  History of laminectomyX3  ICD (implantable cardiac defibrillator) in place  implanted 2005, replaced 10/4/2021 [employer] Subcutaneous ICD  Benign testicular tumor radiation  Status post amputation of toe of right foot8/2021  Status post fracture of femur  2017 left with hardware  S/P TAVR (transcatheter aortic valve replacement)7/2021  H/O Whipple procedure2/2021, 10/2021    Social Hx: no current smoking, ETOH or drugs     FAMILY HISTORY:  Family history of stroke (Mother)    Allergies  Dilaudid (Anaphylaxis; Hives)    Antibiotics:  zosyn     REVIEW OF SYSTEMS:  CONSTITUTIONAL:  No Fever or chills  HEENT:  No diplopia or blurred vision.  No sore throat or runny nose.  CARDIOVASCULAR:  No chest pain or SOB.  RESPIRATORY:  No cough, shortness of breath, PND or orthopnea.  GASTROINTESTINAL:  No nausea, vomiting or diarrhea.  GENITOURINARY:  No dysuria, frequency or urgency. No Blood in urine  MUSCULOSKELETAL:  no joint aches, no muscle pain  SKIN:  foot ulcer   NEUROLOGIC:  No paresthesias, fasciculations, seizures or weakness.  PSYCHIATRIC:  No disorder of thought or mood.  ENDOCRINE:  No heat or cold intolerance, polyuria or polydipsia.  HEMATOLOGICAL:  No easy bruising or bleeding.     Physical Exam:  Vital Signs Last 24 Hrs  T(C): 36.3 (24 Mar 2022 12:58), Max: 36.9 (23 Mar 2022 21:33)  T(F): 97.4 (24 Mar 2022 12:58), Max: 98.4 (23 Mar 2022 21:33)  HR: 61 (24 Mar 2022 12:58) (61 - 69)  BP: 121/71 (24 Mar 2022 12:58) (115/70 - 134/67)  BP(mean): --  RR: 17 (24 Mar 2022 12:58) (14 - 18)  SpO2: 95% (24 Mar 2022 12:58) (95% - 100%)  GEN: NAD, obese   HEENT: normocephalic and atraumatic. EOMI. PERRL.    NECK: Supple.  No lymphadenopathy  LUNGS: Clear to auscultation.  HEART: Regular rate and rhythm, Left lower chest AICD  ABDOMEN: Soft, nontender, and nondistended.   Open wound mid abdomen with serous discharge, no sign of infection, healing from edges    No sign of infection or cellulitis.   : No CVA tenderness  EXTREMITIES: R foot s/p hallux amputation, 2nd toe is a hammer toe,   dorsal mid-digit ulcer with bloody purulent discharge and open wound that probes to bone.   NEUROLOGIC: grossly intact.  PSYCHIATRIC: Appropriate affect .  SKIN: No rash    Labs:                        9.7    3.70  )-----------( 155      ( 24 Mar 2022 07:47 )             29.7     03-24    137  |  104  |  12  ----------------------------<  103<H>  4.6   |  29  |  0.92    Ca    9.1      24 Mar 2022 07:47  Phos  3.4     03-23  Mg     2.2     03-23    Culture - Tissue with Gram Stain (collected 03-24-22 @ 00:38)  Source: .Tissue Other, RIGHT 2ND TOE  Gram Stain (03-24-22 @ 03:51):    Rare polymorphonuclear leukocytes seen per low power field    No organisms seen per oil power field    Culture - Other (collected 03-17-22 @ 23:28)  Source: .Other Right foot 2nd digit  Final Report (03-20-22 @ 16:48):    Moderate Citrobacter koseri    Few Klebsiella pneumoniae    Few Enterococcus faecalis    Normal skin davonte isolated  Organism: Citrobacter koseri  Klebsiella pneumoniae  Enterococcus faecalis (03-20-22 @ 16:48)  Organism: Enterococcus faecalis (03-20-22 @ 16:48)    Sensitivities:      -  Ampicillin: S <=2 Predicts results to ampicillin/sulbactam, amoxacillin-clavulanate and  piperacillin-tazobactam.      -  Tetra/Doxy: R >8      -  Vancomycin: S 1      Method Type: TYSON  Organism: Klebsiella pneumoniae (03-20-22 @ 16:48)    Sensitivities:      -  Amikacin: S <=16      -  Amoxicillin/Clavulanic Acid: S <=8/4      -  Ampicillin: R 16 These ampicillin results predict results for amoxicillin      -  Ampicillin/Sulbactam: S <=4/2 Enterobacter, Klebsiella aerogenes, Citrobacter, and Serratia may develop resistance during prolonged therapy (3-4 days)      -  Aztreonam: S <=4      -  Cefazolin: S <=2 Enterobacter, Klebsiella aerogenes, Citrobacter, and Serratia may develop resistance during prolonged therapy (3-4 days)      -  Cefepime: S <=2      -  Cefoxitin: S <=8      -  Ceftriaxone: S <=1 Enterobacter, Klebsiella aerogenes, Citrobacter, and Serratia may develop resistance during prolonged therapy      -  Ciprofloxacin: S <=0.25      -  Ertapenem: S <=0.5      -  Gentamicin: S <=2      -  Imipenem: S <=1      -  Levofloxacin: S <=0.5      -  Meropenem: S <=1      -  Piperacillin/Tazobactam: S <=8      -  Tobramycin: S <=2      -  Trimethoprim/Sulfamethoxazole: S <=0.5/9.5      Method Type: TYSON  Organism: Citrobacter koseri (03-20-22 @ 16:48)    Sensitivities:      -  Amikacin: S <=16      -  Amoxicillin/Clavulanic Acid: S <=8/4      -  Ampicillin: R >16 These ampicillin results predict results for amoxicillin      -  Ampicillin/Sulbactam: S <=4/2 Enterobacter, Klebsiella aerogenes, Citrobacter, and Serratia may develop resistance during prolonged therapy (3-4 days)      -  Aztreonam: S <=4      -  Cefazolin: S <=2 Enterobacter, Klebsiella aerogenes, Citrobacter, and Serratia may develop resistance during prolonged therapy (3-4 days)      -  Cefepime: S <=2      -  Cefotaxime: S <=2      -  Cefoxitin: S <=8      -  Ceftazidime: S <=1      -  Ceftriaxone: S <=1 Enterobacter, Klebsiella aerogenes, Citrobacter, and Serratia may develop resistance during prolonged therapy      -  Cefuroxime: S 8      -  Ciprofloxacin: S <=0.25      -  Ertapenem: S <=0.5      -  Gentamicin: S <=2      -  Imipenem: S <=1      -  Levofloxacin: S <=0.5      -  Meropenem: S <=1      -  Minocycline: S <=4      -  Piperacillin/Tazobactam: S <=8      -  Tigecycline: S <=2      -  Tobramycin: S <=2      -  Trimethoprim/Sulfamethoxazole: S <=0.5/9.5      Method Type: TYSON    Culture - Blood (collected 03-17-22 @ 22:01)  Source: .Blood Blood-Peripheral  Final Report (03-22-22 @ 23:00):    No Growth Final    Culture - Blood (collected 03-17-22 @ 22:01)  Source: .Blood Blood-Peripheral  Final Report (03-22-22 @ 23:00):    No Growth Final    WBC Count: 3.70 K/uL (03-24-22 @ 07:47)  WBC Count: 3.09 K/uL (03-23-22 @ 05:49)  WBC Count: 3.20 K/uL (03-22-22 @ 07:16)  WBC Count: 4.06 K/uL (03-21-22 @ 06:52)  WBC Count: 3.68 K/uL (03-20-22 @ 05:58)    Creatinine, Serum: 0.92 mg/dL (03-24-22 @ 07:47)  Creatinine, Serum: 0.88 mg/dL (03-23-22 @ 05:49)  Creatinine, Serum: 0.75 mg/dL (03-22-22 @ 07:16)  Creatinine, Serum: 0.71 mg/dL (03-21-22 @ 06:52)  Creatinine, Serum: 0.73 mg/dL (03-20-22 @ 05:58)    C-Reactive Protein, Serum: 15 mg/L (03-17-22 @ 21:53)    Sedimentation Rate, Erythrocyte: 56 mm/hr (03-17-22 @ 17:54)    COVID-19 PCR: NotDetec (03-23-22 @ 07:28)  COVID-19 PCR: NotDetec (03-17-22 @ 17:54)  COVID-19 PCR: NotDetec (03-16-22 @ 19:16)    All imaging and other data have been reviewed.  < from: Xray Foot AP + Lateral + Oblique, Right (03.17.22 @ 17:36) >  ACC: [id number] EXAM:  XR CHEST PORTABLE IMMED 1V                        ACC: [id number] EXAM:  XR FOOT COMP MIN 3 VIEWS RT                        PROCEDURE DATE:  03/17/2022    INTERPRETATION:  Right foot and chest. Patient has an infection ofthe   second toe.  Right foot. 3 views.  Again noted is a well-healed amputation of the distal first metatarsal.  There is mild hammertoe deformity but likely of the second toe. No active   bone destruction or fracture evident. Findings similarto October 28, 2021.  AP semierect chest on March 17, 2022 at 5:18 PM.  Elevated diaphragms crowds the chest.  Heart magnified by technique.  Left epicardial pacemaker and very extensive thoracolumbar hardware again   noted.  Persistent mild left base atelectasis.  Findings similar to CAT scan of March 7 of this year.  Temporary aortic valve again noted.  IMPRESSION: Stable right foot findings. Persistent minimal atelectasis   left base. Other findings as above.    Assessment and Plan:   79 yo man with PMH of HTN, HFrEF, CAD, Vtach with AICD, TAVR, PEs, Spinal stenosis, BPH and Left THR with revisions, pancreatic cancer (dx 3/2021, chemo) s/p ERCP s/p whipple 3/2021 and 10/26/21, and Osteomyelitis of R hallux s/p amputation 9/202 and MSSA bacteremia was admitted from wound center with R 2nd toe chronic wound and infection.   Last time hallux infection showed MSSA causing bacteremia and after surgery tissue culture was pseudomonas and staph Pettenkoferi (Oxacillin Sensitive).  ESR 56 and CRP 15   Xray with no bone involvement     He had pseudomonas and staph aureus and staph Pettenkoferi both Sensitive, in last admission and has citrobacter, Klebsiella and enterococcus this time.     R 2nd toe cellulitis and chronic wound   - Blood culture x 2 NGTD  - Wound culture with citrobacter, Klebsiella and enterococcus    - Bone scan negative for OM  - Podiatry and vascular are on the case  - S/p Bilateral 2nd toe arthroplasty, bone biopsy and cultures are sent as well, will follow results   - Stop zosyn   - Start Augmentin 875mg q12 orally for 4 more days    Will follow PRN.    Jaren Bell MD  Division of Infectious Diseases   Please call ID service at [phone] with any question.      35 minutes spent on total encounter assessing patient, examination, chart reivew, counseling and coordinating care by the attending physician/nurse/care manager.

## 2022-03-24 NOTE — DISCHARGE NOTE PROVIDER - CARE PROVIDER_API CALL
Jaren Bell)  Infectious Disease; Internal Medicine  [address] [place] [address]  Phone: [phone]  Fax: [phone]  Follow Up Time: 1-3 days    Arsh Kumar (DPM)  Surgery  [address] [place] [address]  Phone: [phone]  Fax: [phone]  Established Patient  Follow Up Time: 1-3 days

## 2022-03-24 NOTE — DISCHARGE NOTE PROVIDER - NSDCFUADDAPPT_GEN_ALL_CORE_FT
Patient is to follow up in [facility] with Dr. Felipe or Dr. López within 3-5 days upon discharge. Please call [phone] as soon as discharged and state that it is for a "post-op appointment".

## 2022-03-24 NOTE — PROGRESS NOTE ADULT - SUBJECTIVE AND OBJECTIVE BOX
DEPARTMENT OF ANESTHESIA  POST ANESTHETIC EVALUATION    The Patient was interviewed and evaluated    Vital Signs Last 24 Hrs  T(C): 36.4 (24 Mar 2022 06:30), Max: 36.9 (23 Mar 2022 21:33)  T(F): 97.6 (24 Mar 2022 06:30), Max: 98.4 (23 Mar 2022 21:33)  HR: 63 (24 Mar 2022 06:30) (59 - 69)  BP: 127/74 (24 Mar 2022 06:30) (112/66 - 134/67)  BP(mean): --  RR: 17 (24 Mar 2022 06:30) (14 - 18)  SpO2: 96% (24 Mar 2022 06:30) (95% - 100%)    Evaluation:      (x ) No apparent complications or complaints regarding anesthesia care at this time  (x ) All questions were answered    Condition:  (x ) Stable      ( ) Guarded      ( ) Critical    Recommendations:  (x ) None     ( ) Other:

## 2022-03-24 NOTE — PROGRESS NOTE ADULT - ASSESSMENT
80 year old male with PMH pancreatic cancer (dx 3/2021, chemo) s/p ERCP s/p whipple 3/2021 and 10/26/21, HTN, HLD, HFrEF (Oct 2021 EF: 35%), CAD s/p stents x2 (~15 years ago), Vtach with AICD (implanted 2005, extracted and re-implantation 9/2021 and 10/4/21), TAVR (4/2021), bilateral PE (7/2021) on Xarelto, spinal stenosis, macular degeneration, GERD, BPH, Left THR (x2 revisions), left femur fracture (hardware), Osteomyelitis s/p R hallux amputation 9/2021, MSSA/VRE bacteremia admitted for infected wound of RLE 2nd digit.      Problem/Plan - 1:  ·  Problem: Diabetic toe ulcer.   ·  Plan: Sent by wound care for ulceration of RLE 2nd digit  PMHx: Sep 2021 episode of osteomyelitis of R hallux s/p amputation   S/p B/L 2nd digit arthroplasties 03/23/2022 (POD #1)  Elevated ESR 56, CRP 15  s/p zosyn IV - changed to augmentin per ID  3/17 blood cx show NGTD  3/17 tissue cx with klebsiella, citrobacter, and enterococcous  RLE xray shows no bone destruction or fracture, s/p amputation of distal 1st metatarsal  WBAT with surgical shoe  Apprec PT eval - home PT  Surgical intervention pending results of above  Podiatry consulted, f/u recs  ID Dr Bell consulted, appreciate recs; as per d/w ID, wound care consult called, and recs reviewed  Vascular consulted, appreciate recs - no indication for surgical intervention at this time  Pain control with tylenol for mild pain, tramadol added for moderate pain, given regularly at bedtime     Problem/Plan - 2:  ·  Problem: Chronic HFrEF (heart failure with reduced ejection fraction).   ·  Plan: Oct 2021 EF 35% with global LV dysfunction  Continue home lasix   Continue home metoprolol  CXR with some atelectasis, no PNA, consolidation, or effusions  Patient has AICD - not MRI compatible  Apprec cardio consult re optimization.     Problem/Plan - 3:  ·  Problem: Pulmonary embolism.   ·  Plan: July 2021 b/l PEs  Xarelto, continue     Problem/Plan - 4:  ·  Problem: T2DM (type 2 diabetes mellitus).   ·  Plan: HbA1c at 5.8, controlled  Start insulin corrective scale while inpatient  Routine glycemic monitoring.     Problem/Plan - 5:  ·  Problem: Ventricular tachyarrhythmia.   ·  Plan: Hx of Vtach during [facility] admission at [facility] for Whipple  Has AICD in place (reimplanted Oct 2021 due to bacteremia)  Continue home amiodarone.     Problem/Plan - 6:  ·  Problem: Pancreatic cancer.   ·  Plan: s/p Whipple x2, most recent Oct 2021  Continue home pancrelipase  Abdominal wound in midline, receives hyperbaric O2 therapy  Wound care consulted, appreciate recs  On maalox for abdominal bloating     Problem/Plan - 7:  ·  Problem: Macular degeneration.   ·  Plan: Continue home regimen: brimonidine, timolol, latonoprost.     Problem/Plan - 8:  ·  Problem: BPH (benign prostatic hyperplasia).   ·  Plan: Continue home flomax.     Problem/Plan - 9:  ·  Problem: GERD (gastroesophageal reflux disease).   ·  Plan: Continue home protonix.     Problem/Plan - 10:  ·  Problem: Need for prophylactic measure.   ·  Plan; DVT ppx: xarelto

## 2022-03-24 NOTE — DISCHARGE NOTE PROVIDER - NSDCFUSCHEDAPPT_GEN_ALL_CORE_FT
JUANIS DE SANTIAGO ; 03/25/2022 ; NPP Outp Hyperb [address]  JUANIS DE SANTIAGO ; 03/28/2022 ; NPP Rad Med [address] [facility]  JUANIS DE SANTIAGO ; 04/05/2022 ; [facility]PP [facility]  JUANIS DE SANTIAGO ; 05/04/2022 ; NPP Cardio Electro [address] JUANIS Guzman ; 05/06/2022 ; NP Cardio Electro [address] JUANIS Guzman ; 06/14/2022 ; NPP [facility] JUANIS DE SANTIAGO ; 03/28/2022 ; NPP Outp Hyperb [address] [facility]  JUANIS DE SANTIAGO ; 03/28/2022 ; NPP Rad Med [facility]  JUANIS DE SANTIAGO ; 03/29/2022 ; NPP Outp Hyperb [address] [facility]  JUANIS DE SANTIAGO ; 03/30/2022 ; NPP Outp Hyperb [address] [facility]  JUANIS DE SANTIAGO ; 03/31/2022 ; NPP Outp Hyperb [address] [facility]  JUANIS DE SANTIAGO ; 04/01/2022 ; NPP Outp Hyperb [address] [facility]  JUANIS DE SANTIAGO ; 04/05/2022 ; NPP Cardio [address] [facility]  JUANIS DE SANTIAGO ; 05/04/2022 ; NPP Cardio [address] JUANIS Guzman ; 05/06/2022 ; NPP Cardio Electro [address] JUANIS Guzman ; 06/14/2022 ; NPP [facility]

## 2022-03-24 NOTE — PROGRESS NOTE ADULT - SUBJECTIVE AND OBJECTIVE BOX
HPI:  81 year old Male with PMHx of pancreatic cancer (dx 3/2021, chemo) s/p ERCP s/p whipple 3/2021 and 10/26/21, HTN, HLD, HFrEF (Oct 2021 EF: 35%), CAD s/p stents x2 (~15 years ago), Vtach with AICD (implanted 2005, extracted and re-implantation 9/2021 and 10/4/21), TAVR (4/2021), bilateral PE (7/2021) on Xarelto, spinal stenosis, macular degeneration, GERD, BPH, Left THR (x2 revisions), left femur fracture (hardware), osteomyelitis s/p R hallux amputation 9/2021, MSSA/VRE bacteremia seen bedside today s/p b/l 2nd digit arthroplasties (DOS: 3/23/22). Pt admits to mild pain in the right 2nd digit, none in the left. No other pedal complaints at this time. Denies any fever, chills, nausea, vomiting, chest pain, shortness of breath, or calf pain at this time.    PAST MEDICAL & SURGICAL HISTORY:  HTN (hypertension)    HLD (hyperlipidemia)    GERD (gastroesophageal reflux disease)    Cardiomyopathy    MSSA bacteremia  9/2021    Acute osteomyelitis    Pulmonary embolism    Pancreas cancer  chemo    [place] (hard of hearing)  B/L hearing aids    History of total hip replacement  left X 1, 2 revisions    History of laminectomy  X3    ICD (implantable cardiac defibrillator) in place  implanted 2005, replaced 10/4/2021 [employer] Subcutaneous ICD    Benign testicular tumor  radiation    Status post amputation of toe of right foot  8/2021    Status post fracture of femur  2017 left with hardware    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    H/O Whipple procedure  2/2021    Allergies    Dilaudid (Anaphylaxis; Hives)    FAMILY HISTORY:  Family history of stroke (Mother)    MEDICATIONS  (STANDING):  aluminum hydroxide/magnesium hydroxide/simethicone Suspension 30 milliLiter(s) Oral every 6 hours  aMIOdarone    Tablet 200 milliGRAM(s) Oral daily  ascorbic acid 500 milliGRAM(s) Oral daily  brimonidine 0.2% Ophthalmic Solution 1 Drop(s) Both EYES two times a day  dextrose 40% Gel 15 Gram(s) Oral once  dextrose 5% + sodium chloride 0.9%. 1000 milliLiter(s) (25 mL/Hr) IV Continuous <Continuous>  dextrose 50% Injectable 25 Gram(s) IV Push once  dextrose 50% Injectable 12.5 Gram(s) IV Push once  dextrose 50% Injectable 25 Gram(s) IV Push once  DULoxetine 60 milliGRAM(s) Oral daily  furosemide    Tablet 20 milliGRAM(s) Oral daily  glucagon  Injectable 1 milliGRAM(s) IntraMuscular once  insulin lispro (ADMELOG) corrective regimen sliding scale   SubCutaneous three times a day before meals  insulin lispro (ADMELOG) corrective regimen sliding scale   SubCutaneous at bedtime  latanoprost 0.005% Ophthalmic Solution 1 Drop(s) Both EYES at bedtime  metoprolol succinate ER 50 milliGRAM(s) Oral daily  multivitamin/minerals 1 Tablet(s) Oral daily  pancrelipase  (CREON 36,000 Lipase Units) 3 Capsule(s) Oral three times a day with meals  pantoprazole    Tablet 40 milliGRAM(s) Oral before breakfast  tamsulosin 0.4 milliGRAM(s) Oral at bedtime  timolol 0.5% Solution 1 Drop(s) Both EYES every 12 hours  traMADol 25 milliGRAM(s) Oral at bedtime    MEDICATIONS  (PRN):  acetaminophen     Tablet .. 650 milliGRAM(s) Oral every 6 hours PRN Temp greater or equal to 38C (100.4F), Mild Pain (1 - 3)  ALPRAZolam 0.25 milliGRAM(s) Oral at bedtime PRN Insomnia  ondansetron Injectable 4 milliGRAM(s) IV Push every 6 hours PRN Nausea and/or Vomiting  traMADol 25 milliGRAM(s) Oral three times a day PRN Moderate Pain (4 - 6)    Vital Signs Last 24 Hrs  T(C): 36.4 (24 Mar 2022 06:30), Max: 36.9 (23 Mar 2022 21:33)  T(F): 97.6 (24 Mar 2022 06:30), Max: 98.4 (23 Mar 2022 21:33)  HR: 63 (24 Mar 2022 06:30) (59 - 69)  BP: 127/74 (24 Mar 2022 06:30) (112/66 - 134/67)  BP(mean): --  RR: 17 (24 Mar 2022 06:30) (14 - 18)  SpO2: 96% (24 Mar 2022 06:30) (95% - 100%)    PHYSICAL EXAM:  Vascular: DP palpable b/l, PT non-palpable b/l, Capillary refill 3 seconds to remaining digits, Digital hair absent bilaterally, right 2nd digit edema, skin temp wnl b/l.  Neurological: Light touch sensation diminished bilaterally.  Musculoskeletal: s/p right partial ray amputation. Hammered digits b/l. s/p b/l 2nd digit arthroplasties. AJ & STJ ROM intact.  Dermatological: Surgical incision to b/l 2nd digits with sutures intact, no active drainage.    CBC Full  -  ( 24 Mar 2022 07:47 )  WBC Count : 3.70 K/uL  RBC Count : 3.09 M/uL  Hemoglobin : 9.7 g/dL  Hematocrit : 29.7 %  Platelet Count - Automated : 155 K/uL  Mean Cell Volume : 96.1 fl  Mean Cell Hemoglobin : 31.4 pg  Mean Cell Hemoglobin Concentration : 32.7 gm/dL  Auto Neutrophil # : x  Auto Lymphocyte # : x  Auto Monocyte # : x  Auto Eosinophil # : x  Auto Basophil # : x  Auto Neutrophil % : x  Auto Lymphocyte % : x  Auto Monocyte % : x  Auto Eosinophil % : x  Auto Basophil % : x      03-24    137  |  104  |  12  ----------------------------<  103<H>  4.6   |  29  |  0.92    Ca    9.1      24 Mar 2022 07:47  Phos  3.4     03-23  Mg     2.2     03-23

## 2022-03-24 NOTE — DISCHARGE NOTE NURSING/CASE MANAGEMENT/SOCIAL WORK - NSDCVIVACCINE_GEN_ALL_CORE_FT
Recommendation Preamble: The following recommendations were made during the visit:spf 30 Detail Level: Zone Render Risk Assessment In Note?: no Patient Management Risk Assessment: High Recommendation Preamble: The following recommendations were made during the visit: spf No Vaccines Administered.

## 2022-03-24 NOTE — DISCHARGE NOTE NURSING/CASE MANAGEMENT/SOCIAL WORK - PATIENT PORTAL LINK FT
You can access the FollowMyHealth Patient Portal offered by [facility] by registering at the following website: [email or web address]. By joining [facility]’s FollowMyHealth portal, you will also be able to view your health information using other applications (apps) compatible with our system.

## 2022-03-24 NOTE — PROGRESS NOTE ADULT - ASSESSMENT
80 year old male with PMH pancreatic cancer (dx 3/2021, chemo) s/p ERCP s/p whipple 3/2021 and 10/26/21, HTN, HLD, HFrEF (Oct 2021 EF: 35%), CAD s/p stents x2 (~15 years ago), Vtach with AICD (implanted 2005, extracted and re-implantation 9/2021 and 10/4/21), TAVR (4/2021), bilateral PE (7/2021) on Xarelto, spinal stenosis, macular degeneration, GERD, BPH, Left THR (x2 revisions), left femur fracture (hardware), Osteomyelitis s/p R hallux amputation 9/2021, MSSA/VRE bacteremia presenting with wound of RLE 2nd digit.  Planned for b/l 2nd digit arthroplasty today    HTN, HLD, HFrEF, CAD, Pre- optimization   - p/w +wound, s/p b/l 2nd digit arthroplasties POD #1, tolerated well from CV POV  - EKG 03/17: Line NSR, probable APC. LBBB. No signs of acute ischemia, no anginal complaints  - h/o unstable VT: newly implanted [employer] ICD (10/4/2021), per [employer] (last interrogated 2/2022), Battery life: 96% remaining, with  recorded episode AFIB with RVR (11/1/2021) successfully terminated with ICD shock, no other events noted   - CORBIN 11/09/21: estimated EF 30%. Severe global LV systolic dysfunction. Mitral annular and mitral leaflets calcification. Mild MR. Moderate MS. Transcatheter aortic valve replacement.   - No signs of volume overload on exam  - continue AC, diuretics, home dosing  - BP and HR controlled and stable   - continue routine hemodynamic   - BB, CCB with hold parameters, home dosing   - Monitor and replete lytes, keep K>4, Mg>2.    - All other medical needs as per primary team.  - Other cardiovascular workup will depend on clinical course.  - Will continue to follow.    Laney Warner [facility]  Nurse Practitioner - Cardiology   Spectra #[id number]

## 2022-03-24 NOTE — PROGRESS NOTE ADULT - SUBJECTIVE AND OBJECTIVE BOX
Patient is a 81y old  Male who presents with a chief complaint of Diabetic foot ulcer (24 Mar 2022 13:41)      INTERVAL HPI/OVERNIGHT EVENTS: Patient seen and examined at bedside. No overnight events occurred. Patient has no complaints at this time. Denies fevers, chills, headache, lightheadedness, chest pain, dyspnea, abdominal pain, n/v/d/c.    MEDICATIONS  (STANDING):  aluminum hydroxide/magnesium hydroxide/simethicone Suspension 30 milliLiter(s) Oral every 6 hours  aMIOdarone    Tablet 200 milliGRAM(s) Oral daily  amoxicillin  875 milliGRAM(s)/clavulanate 1 Tablet(s) Oral every 12 hours  ascorbic acid 500 milliGRAM(s) Oral daily  brimonidine 0.2% Ophthalmic Solution 1 Drop(s) Both EYES two times a day  dextrose 40% Gel 15 Gram(s) Oral once  dextrose 5% + sodium chloride 0.9%. 1000 milliLiter(s) (25 mL/Hr) IV Continuous <Continuous>  dextrose 50% Injectable 25 Gram(s) IV Push once  dextrose 50% Injectable 12.5 Gram(s) IV Push once  dextrose 50% Injectable 25 Gram(s) IV Push once  DULoxetine 60 milliGRAM(s) Oral daily  furosemide    Tablet 20 milliGRAM(s) Oral daily  glucagon  Injectable 1 milliGRAM(s) IntraMuscular once  insulin lispro (ADMELOG) corrective regimen sliding scale   SubCutaneous three times a day before meals  insulin lispro (ADMELOG) corrective regimen sliding scale   SubCutaneous at bedtime  latanoprost 0.005% Ophthalmic Solution 1 Drop(s) Both EYES at bedtime  metoprolol succinate ER 50 milliGRAM(s) Oral daily  multivitamin/minerals 1 Tablet(s) Oral daily  pancrelipase  (CREON 36,000 Lipase Units) 3 Capsule(s) Oral three times a day with meals  pantoprazole    Tablet 40 milliGRAM(s) Oral before breakfast  rivaroxaban 20 milliGRAM(s) Oral with dinner  tamsulosin 0.4 milliGRAM(s) Oral at bedtime  timolol 0.5% Solution 1 Drop(s) Both EYES every 12 hours  traMADol 25 milliGRAM(s) Oral at bedtime    MEDICATIONS  (PRN):  acetaminophen     Tablet .. 650 milliGRAM(s) Oral every 6 hours PRN Temp greater or equal to 38C (100.4F), Mild Pain (1 - 3)  ALPRAZolam 0.25 milliGRAM(s) Oral at bedtime PRN Insomnia  ondansetron Injectable 4 milliGRAM(s) IV Push every 6 hours PRN Nausea and/or Vomiting  traMADol 25 milliGRAM(s) Oral three times a day PRN Moderate Pain (4 - 6)      Allergies    Dilaudid (Anaphylaxis; Hives)    Intolerances        REVIEW OF SYSTEMS:  CONSTITUTIONAL: No fever or chills  HEENT:  No headache, no sore throat  RESPIRATORY: No cough, wheezing, or shortness of breath  CARDIOVASCULAR: No chest pain, palpitations  GASTROINTESTINAL: No abd pain, nausea, vomiting, or diarrhea  GENITOURINARY: No dysuria, frequency, or hematuria  NEUROLOGICAL: no focal weakness or dizziness  MUSCULOSKELETAL: no myalgias     Vital Signs Last 24 Hrs  T(C): 36.3 (24 Mar 2022 12:58), Max: 36.9 (23 Mar 2022 21:33)  T(F): 97.4 (24 Mar 2022 12:58), Max: 98.4 (23 Mar 2022 21:33)  HR: 61 (24 Mar 2022 12:58) (61 - 69)  BP: 121/71 (24 Mar 2022 12:58) (115/70 - 134/67)  BP(mean): --  RR: 17 (24 Mar 2022 12:58) (14 - 18)  SpO2: 95% (24 Mar 2022 12:58) (95% - 100%)    PHYSICAL EXAM:  GENERAL: NAD  HEENT:  anicteric, moist mucous membranes  CHEST/LUNG:  CTA b/l, no rales, wheezes, or rhonchi  HEART:  RRR, S1, S2  ABDOMEN:  BS+, soft, nontender, nondistended  EXTREMITIES: no clubbing, cyanosis, or edema; R foot s/p hallux amputation chronic, Rt 2nd toe is a hammer toe with dorsal mid-digit ulcer, wrapped in clean dressing, pedal pulses present b/l, b/l foot warm to touch, no erythema  NERVOUS SYSTEM: answers questions and follows commands appropriately    LABS:                        9.7    3.70  )-----------( 155      ( 24 Mar 2022 07:47 )             29.7     CBC Full  -  ( 24 Mar 2022 07:47 )  WBC Count : 3.70 K/uL  Hemoglobin : 9.7 g/dL  Hematocrit : 29.7 %  Platelet Count - Automated : 155 K/uL  Mean Cell Volume : 96.1 fl  Mean Cell Hemoglobin : 31.4 pg  Mean Cell Hemoglobin Concentration : 32.7 gm/dL  Auto Neutrophil # : x  Auto Lymphocyte # : x  Auto Monocyte # : x  Auto Eosinophil # : x  Auto Basophil # : x  Auto Neutrophil % : x  Auto Lymphocyte % : x  Auto Monocyte % : x  Auto Eosinophil % : x  Auto Basophil % : x    24 Mar 2022 07:47    137    |  104    |  12     ----------------------------<  103    4.6     |  29     |  0.92     Ca    9.1        24 Mar 2022 07:47          CAPILLARY BLOOD GLUCOSE      POCT Blood Glucose.: 148 mg/dL (24 Mar 2022 11:53)  POCT Blood Glucose.: 95 mg/dL (24 Mar 2022 07:38)  POCT Blood Glucose.: 151 mg/dL (23 Mar 2022 21:24)  POCT Blood Glucose.: 91 mg/dL (23 Mar 2022 19:15)        Culture - Tissue with Gram Stain (collected 03-24-22 @ 00:38)  Source: .Tissue Other, RIGHT 2ND TOE  Gram Stain (03-24-22 @ 03:51):    Rare polymorphonuclear leukocytes seen per low power field    No organisms seen per oil power field    Culture - Other (collected 03-17-22 @ 23:28)  Source: .Other Right foot 2nd digit  Final Report (03-20-22 @ 16:48):    Moderate Citrobacter koseri    Few Klebsiella pneumoniae    Few Enterococcus faecalis    Normal skin davonte isolated  Organism: Citrobacter koseri  Klebsiella pneumoniae  Enterococcus faecalis (03-20-22 @ 16:48)  Organism: Enterococcus faecalis (03-20-22 @ 16:48)      -  Ampicillin: S <=2 Predicts results to ampicillin/sulbactam, amoxacillin-clavulanate and  piperacillin-tazobactam.      -  Tetra/Doxy: R >8      -  Vancomycin: S 1      Method Type: TYSON  Organism: Klebsiella pneumoniae (03-20-22 @ 16:48)      -  Amikacin: S <=16      -  Amoxicillin/Clavulanic Acid: S <=8/4      -  Ampicillin: R 16 These ampicillin results predict results for amoxicillin      -  Ampicillin/Sulbactam: S <=4/2 Enterobacter, Klebsiella aerogenes, Citrobacter, and Serratia may develop resistance during prolonged therapy (3-4 days)      -  Aztreonam: S <=4      -  Cefazolin: S <=2 Enterobacter, Klebsiella aerogenes, Citrobacter, and Serratia may develop resistance during prolonged therapy (3-4 days)      -  Cefepime: S <=2      -  Cefoxitin: S <=8      -  Ceftriaxone: S <=1 Enterobacter, Klebsiella aerogenes, Citrobacter, and Serratia may develop resistance during prolonged therapy      -  Ciprofloxacin: S <=0.25      -  Ertapenem: S <=0.5      -  Gentamicin: S <=2      -  Imipenem: S <=1      -  Levofloxacin: S <=0.5      -  Meropenem: S <=1      -  Piperacillin/Tazobactam: S <=8      -  Tobramycin: S <=2      -  Trimethoprim/Sulfamethoxazole: S <=0.5/9.5      Method Type: TYSON  Organism: Citrobacter koseri (03-20-22 @ 16:48)      -  Amikacin: S <=16      -  Amoxicillin/Clavulanic Acid: S <=8/4      -  Ampicillin: R >16 These ampicillin results predict results for amoxicillin      -  Ampicillin/Sulbactam: S <=4/2 Enterobacter, Klebsiella aerogenes, Citrobacter, and Serratia may develop resistance during prolonged therapy (3-4 days)      -  Aztreonam: S <=4      -  Cefazolin: S <=2 Enterobacter, Klebsiella aerogenes, Citrobacter, and Serratia may develop resistance during prolonged therapy (3-4 days)      -  Cefepime: S <=2      -  Cefotaxime: S <=2      -  Cefoxitin: S <=8      -  Ceftazidime: S <=1      -  Ceftriaxone: S <=1 Enterobacter, Klebsiella aerogenes, Citrobacter, and Serratia may develop resistance during prolonged therapy      -  Cefuroxime: S 8      -  Ciprofloxacin: S <=0.25      -  Ertapenem: S <=0.5      -  Gentamicin: S <=2      -  Imipenem: S <=1      -  Levofloxacin: S <=0.5      -  Meropenem: S <=1      -  Minocycline: S <=4      -  Piperacillin/Tazobactam: S <=8      -  Tigecycline: S <=2      -  Tobramycin: S <=2      -  Trimethoprim/Sulfamethoxazole: S <=0.5/9.5      Method Type: TYSON    Culture - Blood (collected 03-17-22 @ 22:01)  Source: .Blood Blood-Peripheral  Final Report (03-22-22 @ 23:00):    No Growth Final    Culture - Blood (collected 03-17-22 @ 22:01)  Source: .Blood Blood-Peripheral  Final Report (03-22-22 @ 23:00):    No Growth Final        RADIOLOGY & ADDITIONAL TESTS:    Personally reviewed.     Consultant(s) Notes Reviewed:  [x] YES  [ ] NO

## 2022-03-24 NOTE — DISCHARGE NOTE PROVIDER - HOSPITAL COURSE
HPI:  80 year old male with PMH pancreatic cancer (dx 3/2021, chemo) s/p ERCP s/p whipple 3/2021 and 10/26/21, HTN, HLD, HFrEF (Oct 2021 EF: 35%), CAD s/p stents x2 (~15 years ago), Vtach with AICD (implanted 2005, extracted and re-implantation 9/2021 and 10/4/21), TAVR (4/2021), bilateral PE (7/2021) on Xarelto, spinal stenosis, macular degeneration, GERD, BPH, Left THR (x2 revisions), left femur fracture (hardware), Osteomyelitis s/p R hallux amputation 9/2021, MSSA/VRE bacteremia presenting with wound of RLE 2nd digit. Patient follows with wound care for open wound of abdominal wall (present since Whipple in Oct 2021) and was scheduled for hyperbaric O2 treatment tomorrow when he noticed wound. Patient went to wound care center today for evaluation, who recommended he come to ER. At baseline, he does not have sensation in b/l LE. Denies any inciting trauma to digit. Patient denies any fevers, chills, purulent discharge at home.    ED Course:   97.3, 127/82, 67, 16, 100% RA   WBC wnl, Hb 9.9, ESR 56  Given: vanc, zosyn, 1L NS bolus (17 Mar 2022 19:22)    Hospital Course     HPI:  80 year old male with PMH pancreatic cancer (dx 3/2021, chemo) s/p ERCP s/p whipple 3/2021 and 10/26/21, HTN, HLD, HFrEF (Oct 2021 EF: 35%), CAD s/p stents x2 (~15 years ago), Vtach with AICD (implanted 2005, extracted and re-implantation 9/2021 and 10/4/21), TAVR (4/2021), bilateral PE (7/2021) on Xarelto, spinal stenosis, macular degeneration, GERD, BPH, Left THR (x2 revisions), left femur fracture (hardware), Osteomyelitis s/p R hallux amputation 9/2021, MSSA/VRE bacteremia presenting with wound of RLE 2nd digit. Patient follows with wound care for open wound of abdominal wall (present since Whipple in Oct 2021) and was scheduled for hyperbaric O2 treatment tomorrow when he noticed wound. Patient went to wound care center today for evaluation, who recommended he come to ER. At baseline, he does not have sensation in b/l LE. Denies any inciting trauma to digit. Patient denies any fevers, chills, purulent discharge at home.    ED Course:   97.3, 127/82, 67, 16, 100% RA   WBC wnl, Hb 9.9, ESR 56  Given: vanc, zosyn, 1L NS bolus (17 Mar 2022 19:22)    Hospital Course  Admitted for diabetic toe ulcer, started treated with IV zosyn by infectious disease, s/p b/l 2nd digit arthroplasties (DOS: 3/23/22). tissue culture with rare polymorhph nuclear cells, no organisms seen. Transitioned to oral augmentin per infectious disease. Post op H/h stable. Surgical pathology pending, pt aware to follow up.     Consultants  ID - Dr Bell  Podiatry - Arsh Gresham (DPM)    Wound care RN- Maribell Pearson  Vascular - Dr Salinas  Cardio Dr Judd     HPI:  80 year old male with PMH pancreatic cancer (dx 3/2021, chemo) s/p ERCP s/p whipple 3/2021 and 10/26/21, HTN, HLD, HFrEF (Oct 2021 EF: 35%), CAD s/p stents x2 (~15 years ago), Vtach with AICD (implanted 2005, extracted and re-implantation 9/2021 and 10/4/21), TAVR (4/2021), bilateral PE (7/2021) on Xarelto, spinal stenosis, macular degeneration, GERD, BPH, Left THR (x2 revisions), left femur fracture (hardware), Osteomyelitis s/p R hallux amputation 9/2021, MSSA/VRE bacteremia presenting with wound of RLE 2nd digit. Patient follows with wound care for open wound of abdominal wall (present since Whipple in Oct 2021) and was scheduled for hyperbaric O2 treatment tomorrow when he noticed wound. Patient went to wound care center today for evaluation, who recommended he come to ER. At baseline, he does not have sensation in b/l LE. Denies any inciting trauma to digit. Patient denies any fevers, chills, purulent discharge at home.    ED Course:   97.3, 127/82, 67, 16, 100% RA   WBC wnl, Hb 9.9, ESR 56  Given: vanc, zosyn, 1L NS bolus (17 Mar 2022 19:22)    Hospital Course  Admitted for diabetic toe ulcer, started treated with IV zosyn by infectious disease, s/p b/l 2nd digit arthroplasties (DOS: 3/23/22). tissue culture with rare polymorhph nuclear cells, no organisms seen. Transitioned to oral augmentin per infectious disease. Post op H/h stable. Surgical pathology pending, pt aware to follow up.  PT eval done. Surgical shoe provided. Case management c/s to coordinate dispo planning.    Consultants  Infectious disease - Dr Bell  Podiatry - Arsh Gresham (DPM)    Wound care RN- Maribell Pearson  Vascular - Dr Salinas  Cardio Dr Judd

## 2022-03-24 NOTE — DISCHARGE NOTE PROVIDER - NSDCMRMEDTOKEN_GEN_ALL_CORE_FT
ALPRAZolam 0.25 mg oral tablet: 1 tab(s) orally once a day (at bedtime), As needed, agitation  amiodarone 200 mg oral tablet: 1 tab(s) orally every 24 hours starting 11/17  cholecalciferol 400 intl units (10 mcg) oral tablet: 1 tab(s) orally once a day  Co-Q10: 1 tab(s) orally once a day  Combigan 0.2%-0.5% ophthalmic solution: 1 drop(s) to each affected eye every 12 hours  Cymbalta 60 mg oral delayed release capsule: 1 cap(s) orally once a day  furosemide 20 mg oral tablet: 1 tab(s) orally once a day  latanoprost 0.005% ophthalmic solution: 1 drop(s) to each affected eye once a day (at bedtime)  metoprolol succinate 50 mg oral tablet, extended release: 1 tab(s) orally once a day  Multiple Vitamins oral tablet: 1 tab(s) orally once a day  pancrelipase 36,000 units-114,000 units-180,000 units oral delayed release capsule: 3 cap(s) orally 3 times a day (with meals)    *As per patient, MD changed to 3 capsules today  pantoprazole 40 mg oral delayed release tablet: 1 tab(s) orally once a day (before a meal)  tamsulosin 0.4 mg oral capsule: 1 cap(s) orally once a day  Vitamin B6 100 mg oral tablet: 1 tab(s) orally once a day  Vitamin D3: 1 tab(s) orally once a day  Xarelto 20 mg oral tablet: 1 tab(s) orally once a day (in the evening)   acetaminophen 325 mg oral tablet: 2 tab(s) orally every 6 hours, As needed, Temp greater or equal to 38C (100.4F), Mild Pain (1 - 3)  ALPRAZolam 0.25 mg oral tablet: 1 tab(s) orally once a day (at bedtime), As needed, agitation  amiodarone 200 mg oral tablet: 1 tab(s) orally every 24 hours starting 11/17  amoxicillin-clavulanate 875 mg-125 mg oral tablet: 1 tab(s) orally every 12 hours  ascorbic acid 500 mg oral tablet: 1 tab(s) orally once a day  Bacid (LAC) oral tablet: 2 tab(s) orally once a day   cholecalciferol 400 intl units (10 mcg) oral tablet: 1 tab(s) orally once a day  Co-Q10: 1 tab(s) orally once a day  Combigan 0.2%-0.5% ophthalmic solution: 1 drop(s) to each affected eye every 12 hours  Cymbalta 60 mg oral delayed release capsule: 1 cap(s) orally once a day  furosemide 20 mg oral tablet: 1 tab(s) orally once a day  latanoprost 0.005% ophthalmic solution: 1 drop(s) to each affected eye once a day (at bedtime)  metoprolol succinate 50 mg oral tablet, extended release: 1 tab(s) orally once a day  Multiple Vitamins oral tablet: 1 tab(s) orally once a day  pancrelipase 36,000 units-114,000 units-180,000 units oral delayed release capsule: 3 cap(s) orally 3 times a day (with meals)    *As per patient, MD changed to 3 capsules today  pantoprazole 40 mg oral delayed release tablet: 1 tab(s) orally once a day (before a meal)  tamsulosin 0.4 mg oral capsule: 1 cap(s) orally once a day  Vitamin B6 100 mg oral tablet: 1 tab(s) orally once a day  Xarelto 20 mg oral tablet: 1 tab(s) orally once a day (in the evening)

## 2022-03-24 NOTE — PROGRESS NOTE ADULT - PROBLEM SELECTOR PLAN 1
Pt seen and evaluated.  s/p b/l 2nd digit arthroplasties POD #1  f/u OR bone culture, pathology and clean margin for right 2nd digit, and OR bone pathology for left 2nd digit  Per ID- Cont zosyn for now.  ~  Discussed with ID, pt can be d/c tmrw 3/25/22 with PO abx and f/u OR bone culture and pathology results as oupt in the [facility] since there is low suspicion for osteomyelitis. PICC line will be placed outpt if needed.  ~  WOUND CARE INSTRUCTIONS  1. Please leave dressing clean, dry and intact until follow-up. Monitor for any signs of infection and present to the ED if they arise.  2. If the dressing gets wet, please change with dry, sterile dressing.  3. Patient can be weight bearing as tolerated in surgical shoes.  4. Patient is to follow up in [facility] with Dr. Felipe or Dr. López within 3-5 days upon discharge. Please call [phone] as soon as discharged and state that it is for a "post-op appointment".

## 2022-03-24 NOTE — DISCHARGE NOTE PROVIDER - PROVIDER TOKENS
PROVIDER:[TOKEN:[[id number]],FOLLOWUP:[1-3 days]],PROVIDER:[TOKEN:[[id number]],FOLLOWUP:[1-3 days],ESTABLISHEDPATIENT:[T]]

## 2022-03-24 NOTE — PROGRESS NOTE ADULT - TIME BILLING
Chart review, examination, documentation, care coordination and counseling.  F/u post surgery, re- orders
Chart review, examination, documentation, care coordination and counseling.  Pt unable to go home today as its not safe, his home aides can only stat tomorrow and would be unable to function safely at home if he left today, dc in AM

## 2022-03-24 NOTE — DISCHARGE NOTE NURSING/CASE MANAGEMENT/SOCIAL WORK - NSDCFUADDAPPT_GEN_ALL_CORE_FT
Patient is to follow up in the [facility] with Dr. Felipe or Dr. López within 3-5 days upon discharge. Please call [phone] as soon as discharged and state that it is for a "post-op appointment".

## 2022-03-24 NOTE — DISCHARGE NOTE PROVIDER - ATTENDING DISCHARGE PHYSICAL EXAMINATION:
REVIEW OF SYSTEMS:  CONSTITUTIONAL: No fever or chills. no Fatigue, low appetite but trying to eat  HEENT:  No headache, no sore throat  RESPIRATORY: no cough, no shortness of breath  CARDIOVASCULAR: No chest pain, palpitations  GASTROINTESTINAL: No abd pain, nausea, vomiting, or diarrhea  GENITOURINARY: No dysuria, frequency, or hematuria  NEUROLOGICAL: no focal weakness or dizziness  MUSCULOSKELETAL:  no myalgias     Vital Signs Last 24 Hrs  T(C): 36.3 (24 Mar 2022 12:58), Max: 36.9 (23 Mar 2022 21:33)  T(F): 97.4 (24 Mar 2022 12:58), Max: 98.4 (23 Mar 2022 21:33)  HR: 61 (24 Mar 2022 12:58) (61 - 69)  BP: 121/71 (24 Mar 2022 12:58) (115/70 - 134/67)  BP(mean): --  RR: 17 (24 Mar 2022 12:58) (14 - 18)  SpO2: 95% (24 Mar 2022 12:58) (95% - 100%)    PHYSICAL EXAM:  GENERAL: NAD  HEENT:  anicteric, moist mucous membranes  CHEST/LUNG:  CTA b/l, no rales, wheezes, or rhonchi  HEART:  RRR, S1, S2  ABDOMEN:  BS+, soft, nontender, nondistended  EXTREMITIES: no clubbing, cyanosis, or edema; R foot s/p hallux amputation chronic, Rt 2nd toe is a hammer toe with dorsal mid-digit ulcer, wrapped in clean dressing, pedal pulses present b/l, b/l foot warm to touch, no erythema  NERVOUS SYSTEM: answers questions and follows commands appropriately    PT eval done REVIEW OF SYSTEMS:  CONSTITUTIONAL: No fever or chills. no Fatigue, low appetite but trying to eat  HEENT:  No headache, no sore throat  RESPIRATORY: no cough, no shortness of breath  CARDIOVASCULAR: No chest pain, palpitations  GASTROINTESTINAL: No abd pain, nausea, vomiting, or diarrhea  GENITOURINARY: No dysuria, frequency, or hematuria  NEUROLOGICAL: no focal weakness or dizziness  MUSCULOSKELETAL:  no myalgias     Vital Signs Last 24 Hrs  T(C): 36.3 (24 Mar 2022 12:58), Max: 36.9 (23 Mar 2022 21:33)  T(F): 97.4 (24 Mar 2022 12:58), Max: 98.4 (23 Mar 2022 21:33)  HR: 61 (24 Mar 2022 12:58) (61 - 69)  BP: 121/71 (24 Mar 2022 12:58) (115/70 - 134/67)  BP(mean): --  RR: 17 (24 Mar 2022 12:58) (14 - 18)  SpO2: 95% (24 Mar 2022 12:58) (95% - 100%)    PHYSICAL EXAM:  GENERAL: NAD  HEENT:  anicteric, moist mucous membranes  CHEST/LUNG:  CTA b/l, no rales, wheezes, or rhonchi  HEART:  RRR, S1, S2  ABDOMEN:  BS+, soft, nontender, nondistended  EXTREMITIES: no clubbing, cyanosis, or edema; R foot s/p hallux amputation chronic, Rt 2nd toe is a hammer toe with dorsal mid-digit ulcer, wrapped in clean dressing, pedal pulses present b/l, b/l foot warm to touch, no erythema  NERVOUS SYSTEM: answers questions and follows commands appropriately    PT eval done - Home PT recommended   set up home PT REVIEW OF SYSTEMS:  CONSTITUTIONAL: No fever or chills. no Fatigue, low appetite but trying to eat  HEENT:  No headache, no sore throat  RESPIRATORY: no cough, no shortness of breath  CARDIOVASCULAR: No chest pain, palpitations  GASTROINTESTINAL: No abd pain, nausea, vomiting, or diarrhea  GENITOURINARY: No dysuria, frequency, or hematuria  NEUROLOGICAL: no focal weakness or dizziness  MUSCULOSKELETAL:  no myalgias     Vital Signs Last 24 Hrs  T(C): 36.6 (25 Mar 2022 05:25), Max: 36.9 (24 Mar 2022 20:41)  T(F): 97.8 (25 Mar 2022 05:25), Max: 98.4 (24 Mar 2022 20:41)  HR: 70 (25 Mar 2022 05:25) (61 - 70)  BP: 123/71 (25 Mar 2022 05:25) (113/66 - 123/71)  BP(mean): --  RR: 17 (25 Mar 2022 05:25) (17 - 17)  SpO2: 96% (25 Mar 2022 05:25) (93% - 96%)    PHYSICAL EXAM:  GENERAL: NAD  HEENT:  anicteric, moist mucous membranes  CHEST/LUNG:  CTA b/l, no rales, wheezes, or rhonchi  HEART:  RRR, S1, S2  ABDOMEN:  BS+, soft, nontender, nondistended  EXTREMITIES: no clubbing, cyanosis, or edema; R foot s/p hallux amputation chronic, Rt 2nd toe is a hammer toe with dorsal mid-digit ulcer, wrapped in clean dressing, pedal pulses present b/l, b/l foot warm to touch, no erythema  NERVOUS SYSTEM: answers questions and follows commands appropriately    PT eval done - Home PT recommended   set up home PT

## 2022-03-24 NOTE — DISCHARGE NOTE NURSING/CASE MANAGEMENT/SOCIAL WORK - NSDCPEFALRISK_GEN_ALL_CORE
For information on Fall & Injury Prevention, visit: [email or web address] OR  [email or web address] OR  https://www.cdc.gov/steadi/patient.html

## 2022-03-24 NOTE — DISCHARGE NOTE PROVIDER - NSDCCPCAREPLAN_GEN_ALL_CORE_FT
PRINCIPAL DISCHARGE DIAGNOSIS  Diagnosis: Toe infection  Assessment and Plan of Treatment: S/p b/l toe arthroplasties  Surgical pathology report is PENDING - YOU MUST FOLLOW UP ON THIS RESULT - please see Dr Bell.   Continue augmentin as prescribed  - Home physical therapy recommended  WOUND CARE INSTRUCTIONS  1. Please leave dressing clean, dry and intact until follow-up. Monitor for any signs of infection and present to the ED if they arise.  2. If the dressing gets wet, please change with dry, sterile dressing.  3. Patient can be weight bearing as tolerated in surgical shoes.  4. Patient is to follow up [facility] with Dr. Felipe or Dr. López within 3-5 days upon discharge. Please call [phone] as soon as discharged and state that it is for a "post-op appointment".      SECONDARY DISCHARGE DIAGNOSES  Diagnosis: Pulmonary embolism  Assessment and Plan of Treatment: Continue xarelto     PRINCIPAL DISCHARGE DIAGNOSIS  Diagnosis: Toe infection  Assessment and Plan of Treatment: S/p b/l toe arthroplasties  Surgical pathology report is PENDING - YOU MUST FOLLOW UP ON THIS RESULT - please see Dr Bell or PODIATRY in wound clinic.   Continue Augmentin until 03/28/2022.  - Home physical therapy recommended  WOUND CARE INSTRUCTIONS  1. Please leave dressing clean, dry and intact until follow-up. Monitor for any signs of infection and present to the ED if they arise.  2. If the dressing gets wet, please change with dry, sterile dressing.  3. Patient can be weight bearing as tolerated in surgical shoes.  4. Patient is to follow up in [facility] with Dr. Felipe or Dr. López within 3-5 days upon discharge. Please call [phone] as soon as discharged and state that it is for a "post-op appointment".      SECONDARY DISCHARGE DIAGNOSES  Diagnosis: Pancreatic cancer  Assessment and Plan of Treatment: Continue routine care per primary doctors    Diagnosis: Pulmonary embolism  Assessment and Plan of Treatment: Continue xarelto     PRINCIPAL DISCHARGE DIAGNOSIS  Diagnosis: Toe infection  Assessment and Plan of Treatment: S/p b/l toe arthroplasties  Surgical pathology report is PENDING - YOU MUST FOLLOW UP ON THIS RESULT - please see Dr Bell or PODIATRY in wound clinic.   Continue Augmentin until 03/28/2022.  - Home physical therapy recommended  WOUND CARE INSTRUCTIONS  1. Please leave dressing clean, dry and intact until follow-up. Monitor for any signs of infection and present to the ED if they arise.  2. If the dressing gets wet, please change with dry, sterile dressing.  3. Patient can be weight bearing as tolerated in surgical shoes.  4. Patient is to follow up in [facility] with Dr. Felipe or Dr. López within 3-5 days upon discharge. Please call [phone] as soon as discharged and state that it is for a "post-op appointment".      SECONDARY DISCHARGE DIAGNOSES  Diagnosis: Pancreatic cancer  Assessment and Plan of Treatment: Continue routine care per primary doctors    Diagnosis: Pulmonary embolism  Assessment and Plan of Treatment: Continue Xarelto    Diagnosis: Prediabetes  Assessment and Plan of Treatment: A1c 5.8%  Ranges in prediabetic range  *monitor blood sugars with your primary doctor nd repeat A1c later on

## 2022-03-25 PROBLEM — C25.9 MALIGNANT NEOPLASM OF PANCREAS, UNSPECIFIED: Chronic | Status: ACTIVE | Noted: 2021-10-20

## 2022-03-25 NOTE — PROGRESS NOTE ADULT - PROVIDER SPECIALTY LIST ADULT
Hospitalist
Infectious Disease
Infectious Disease
Cardiology
Hospitalist
Hospitalist
Infectious Disease
Infectious Disease
Anesthesia
Hospitalist
Infectious Disease
Infectious Disease
Cardiology
Cardiology
Podiatry

## 2022-03-25 NOTE — PROGRESS NOTE ADULT - SUBJECTIVE AND OBJECTIVE BOX
[facility] -- Milo Thomas, Adolfo, Binta, Arian Gu, Zoltan Herrera: Office # [phone]    Follow Up:  pre-optimization, HTN, HLD, HFrEF, CAD,    Subjective/Observations:  Patient seen and examined, awake, alert, resting comfortably in bed, denies chest pain, dyspnea, palpitations or dizziness, orthopnea and PND. Tolerating room air.     REVIEW OF SYSTEMS: All review of systems is negative for eye, ENT, GI, , allergic, dermatologic, musculoskeletal and neurologic except as described above    PAST MEDICAL & SURGICAL HISTORY:  HTN (hypertension)    HLD (hyperlipidemia)    GERD (gastroesophageal reflux disease)    Cardiomyopathy    MSSA bacteremia  9/2021    Acute osteomyelitis    Pulmonary embolism    Pancreas cancer  chemo, s/p Whipple    [place] [employer] [place] [employer] [place] (hard of hearing)  B/L hearing aids    History of total hip replacement  left X 1, 2 revisions    History of laminectomy  X3    ICD (implantable cardiac defibrillator) in place  implanted 2005, replaced 10/4/2021 Ancestry Subcutaneous ICD    Benign testicular tumor  radiation    Status post amputation of toe of right foot  8/2021    Status post fracture of femur  2017 left with hardware    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 1000 milliLiter(s) (25 mL/Hr) IV Continuous <Continuous>  dextrose 50% Injectable 25 Gram(s) IV Push once  dextrose 50% Injectable 12.5 Gram(s) IV Push once  dextrose 50% Injectable 25 Gram(s) IV Push once  DULoxetine 60 milliGRAM(s) Oral daily  furosemide    Tablet 20 milliGRAM(s) Oral daily  glucagon  Injectable 1 milliGRAM(s) IntraMuscular once  insulin lispro (ADMELOG) corrective regimen sliding scale   SubCutaneous three times a day before meals  insulin lispro (ADMELOG) corrective regimen sliding scale   SubCutaneous at bedtime  latanoprost 0.005% Ophthalmic Solution 1 Drop(s) Both EYES at bedtime  metoprolol succinate ER 50 milliGRAM(s) Oral daily  multivitamin/minerals 1 Tablet(s) Oral daily  pancrelipase  (CREON 36,000 Lipase Units) 3 Capsule(s) Oral three times a day with meals  pantoprazole    Tablet 40 milliGRAM(s) Oral before breakfast  rivaroxaban 20 milliGRAM(s) Oral with dinner  tamsulosin 0.4 milliGRAM(s) Oral at bedtime  timolol 0.5% Solution 1 Drop(s) Both EYES every 12 hours  traMADol 25 milliGRAM(s) Oral at bedtime    MEDICATIONS  (PRN):  acetaminophen     Tablet .. 650 milliGRAM(s) Oral every 6 hours PRN Temp greater or equal to 38C (100.4F), Mild Pain (1 - 3)  ALPRAZolam 0.25 milliGRAM(s) Oral at bedtime PRN Insomnia  ondansetron Injectable 4 milliGRAM(s) IV Push every 6 hours PRN Nausea and/or Vomiting  traMADol 25 milliGRAM(s) Oral three times a day PRN Moderate Pain (4 - 6)    Allergies    Dilaudid (Anaphylaxis; Hives)    Intolerances      Vital Signs Last 24 Hrs  T(C): 36.7 (25 Mar 2022 12:01), Max: 36.9 (24 Mar 2022 20:41)  T(F): 98.1 (25 Mar 2022 12:01), Max: 98.4 (24 Mar 2022 20:41)  HR: 69 (25 Mar 2022 12:01) (69 - 70)  BP: 106/62 (25 Mar 2022 12:01) (106/62 - 123/71)  BP(mean): --  RR: 17 (25 Mar 2022 12:01) (17 - 17)  SpO2: 97% (25 Mar 2022 12:01) (93% - 97%)  I&O's Summary    24 Mar 2022 07:01  -  25 Mar 2022 07:00  --------------------------------------------------------  IN: 0 mL / OUT: 1050 mL / NET: -1050 mL          TELE: Not on telemetry   PHYSICAL EXAM:  Appearance: NAD, no distress, alert,  HEENT: Moist Mucous Membranes, Anicteric  Cardiovascular: Regular rate and rhythm, Normal S1 S2, No JVD, No murmurs, No rubs, gallops or clicks  Respiratory: Non-labored, Clear to auscultation, No rales, No rhonchi, No wheezing.   Gastrointestinal:  Soft, Non-tender, + BS  Neurologic: Non-focal  Skin: Warm and dry, No visible rashes or ulcers, No ecchymosis, No cyanosis, b/l foot dressing CDI   Musculoskeletal: No clubbing, No cyanosis, No joint swelling/tenderness  Psychiatry: Mood & affect appropriate  Lymph: No peripheral edema.     LABS: All Labs Reviewed:                        9.3    4.40  )-----------( 164      ( 25 Mar 2022 07:19 )             28.3                         9.7    3.70  )-----------( 155      ( 24 Mar 2022 07:47 )             29.7                         8.7    3.09  )-----------( 140      ( 23 Mar 2022 05:49 )             26.7     25 Mar 2022 07:19    134    |  103    |  15     ----------------------------<  113    4.0     |  25     |  0.72   24 Mar 2022 07:47    137    |  104    |  12     ----------------------------<  103    4.6     |  29     |  0.92   23 Mar 2022 05:49    138    |  104    |  13     ----------------------------<  110    4.7     |  29     |  0.88     Ca    9.1        25 Mar 2022 07:19  Ca    9.1        24 Mar 2022 07:47  Ca    9.1        23 Mar 2022 05:49  Phos  3.3       25 Mar 2022 07:19  Phos  3.4       23 Mar 2022 05:49  Mg     2.2       25 Mar 2022 07:19  Mg     2.2       23 Mar 2022 05:49                      12 Lead ECG:   Ventricular Rate 66 BPM    Atrial Rate 66 BPM    P-R Interval 146 ms    QRS Duration 126 ms    Q-T Interval 448 ms    QTC Calculation(Bazett) 469 ms    P Axis 88 degrees    R Axis 20 degrees    T Axis 67 degrees    Diagnosis Line Normal sinus rhythm  probable APC  Left bundle branch block  Abnormal ECG  Confirmed by Bigg Mata MD ([id number]) on 3/18/2022 1:04:57 PM (03-17-22 @ 16:41)    < from: Xray Chest 1 View-PORTABLE IMMEDIATE (03.17.22 @ 17:37) >  ACC: [id number] EXAM:  XR CHEST PORTABLE IMMED 1V                        ACC: [id number] EXAM:  XR FOOT COMP MIN 3 VIEWS RT                          PROCEDURE DATE:  03/17/2022          INTERPRETATION:  Right foot and chest. Patient has an infection ofthe   second toe.    Right foot. 3 views.    Again noted is a well-healed amputation of the distal first metatarsal.    There is mild hammertoe deformity but likely of the second toe. No active   bone destruction or fracture evident. Findings similarto October 28, 2021.    AP semierect chest on March 17, 2022 at 5:18 PM.    Elevated diaphragms crowds the chest.    Heart magnified by technique.    Left epicardial pacemaker and very extensive thoracolumbar hardware again   noted.    Persistent mild left base atelectasis.    Findings similar to CAT scan of March 7 of this year.    Temporary aortic valve again noted.    IMPRESSION: Stable right foot findings. Persistent minimal atelectasis   left base. Other findings as above.    --- End of Report ---            JODI GURROLA MD; Attending Radiologist  This document has been electronically signed. Mar 18 2022 10:50AM    < end of copied text >  < from: Transesophageal Echocardiogram w/o TTE (11.09.21 @ 11:35) >    Patient name: JUANIS DE SANTIAGO  YOB: 1940   Age: 80 (M)   MR#: [id number]  Study Date: 11/9/2021  Location: [address] [place], [place] [id number]: Jimbo Dawson M.D.  Study quality: Technically good  Referring Physician: Fady Mercer MD  Blood Pressure: 114/60 mmHg  Height: 188 cm  Weight: 95 kg  BSA: 2.2 m2  Heart Rate: 73 mmHg  ------------------------------------------------------------------------  PROCEDURE: Transesophageal (CORBIN) was performed.  Informed  consent was first obtained for CORBIN. The patient was sedated  - see anesthesia record.  The procedure was monitored with  automatic blood pressure monitoring, ECG tracings and pulse  oximetry. The transesophageal probe was placed in the  esophagus posterior to the heart without complications.  Real-time and reconstructed 3-dimensional imaging was  performed with Workstation. Color Doppler analysis was  carried out using both 2D and 3D mapping.  INDICATION: Bacteremia (R78.81), Presence of prosthetic  heart valve (Z95.2)  ------------------------------------------------------------------------  Dimensions:    Normal Values:  LA:            2.0 - 4.0 cm  Ao:            2.0 - 3.8 cm  SEPTUM:        0.6 - 1.2 cm  PWT:           0.6 - 1.1 cm  LVIDd:         3.0 - 5.6 cm  LVIDs:         1.8 - 4.0 cm  EF (Visual Estimate): 30 %  Doppler Peak Velocity (m/sec): AoV=1.9  ------------------------------------------------------------------------  Observations:  Mitral Valve: Mitral annular calcification and heavily  calcified mitral leaflets with decreased diastolic opening.   No independenlty mobile echogenic massess, however unable  to definatively rule out vegetations. Consider serial  imaging.  Mild mitral regurgitation.  Peak mitral valve  gradient equals 10 mm Hg, mean transmitral valve gradient  equals 5 mm Hg, estimated mitral valve area equals 1.5 sqcm  (by 3D plainimetry), consistent with moderate mitral  stenosis.  Aortic Valve/Aorta: Transcatheter aortic valve replacement.   The valve is well seated. No vegetations seen on the TAVR.   Peak transaortic valve gradient equals 14 mm Hg, which is  probably normal in the presence of a transcatheter aortic  valve replacement. Minimal aortic transvalvular  regurgitation.  No aortic paravalvular regurgitation seen.  Peak left ventricular outflow tract gradient equals 1 mm  Hg.  Moderate non-mobile atheroma noted in aortic  arch/descending aorta.  Left Atrium: Normal left atrium.  Left Ventricle: Severe global left ventricular systolic  dysfunction.  Right Heart: Normal right atrium. The right ventricle is  not well visualized; grossly normal right ventricular  systolic function. There is prolaspe of the posterior  tricuspid valve leaflet. Moderate eccentric tricuspid  regurgitation. Normal pulmonic valve.Minimal pulmonic  regurgitation.  Pericardium/Pleura: Normal pericardium with no pericardial  effusion.  Hemodynamic: Estimated right atrial pressure is 8 mm Hg.  ------------------------------------------------------------------------  Conclusions:  1. Mitral annular calcification and heavily calcified  mitral leaflets with decreased diastolic opening.  No  independenlty mobile echogenic massess, however unable to  definatively rule out vegetations. Consider serial imaging.   Mild mitral regurgitation.  Peak mitral valve gradient  equals 10 mm Hg, mean transmitral valve gradient equals 5  mm Hg, estimated mitral valve area equals 1.5 sqcm (by 3D  plainimetry), consistent with moderate mitral stenosis.  2. Transcatheter aortic valve replacement.  The valve is  well seated. No vegetations seen on the TAVR.  Minimal  aortic transvalvular regurgitation.  No aortic paravalvular  regurgitation seen.  3. Severe global left ventricular systolic dysfunction.  4. The right ventricle is not well visualized; grossly  normal right ventricular systolic function.  *** Compared with echocardiogram of 10/27/2021 and CORBIN of  9/27/2021, no significant changes noted.  ------------------------------------------------------------------------  Confirmed on 11/9/2021 - 12:56:45 by Juan Pierre M.D.  ------------------------------------------------------------------------    < end of copied text >

## 2022-03-25 NOTE — PROGRESS NOTE ADULT - ASSESSMENT
80 year old male with PMH pancreatic cancer (dx 3/2021, chemo) s/p ERCP s/p whipple 3/2021 and 10/26/21, HTN, HLD, HFrEF (Oct 2021 EF: 35%), CAD s/p stents x2 (~15 years ago), Vtach with AICD (implanted 2005, extracted and re-implantation 9/2021 and 10/4/21), TAVR (4/2021), bilateral PE (7/2021) on Xarelto, spinal stenosis, macular degeneration, GERD, BPH, Left THR (x2 revisions), left femur fracture (hardware), Osteomyelitis s/p R hallux amputation 9/2021, MSSA/VRE bacteremia presenting with wound of RLE 2nd digit.  Planned for b/l 2nd digit arthroplasty today    HTN, HLD, HFrEF, CAD, Pre- optimization   - p/w +wound, s/p b/l 2nd digit arthroplasties POD #2, tolerated well and remains stable from CV POV  - EKG 03/17: Line NSR, probable APC. LBBB. No signs of acute ischemia, no anginal complaints  - h/o unstable VT: newly implanted [employer] ICD (10/4/2021), per [employer] (last interrogated 2/2022), Battery life: 96% remaining, with  recorded episode AFIB with RVR (11/1/2021) successfully terminated with ICD shock, no other events noted   - CORBIN 11/09/21: estimated EF 30%. Severe global LV systolic dysfunction. Mitral annular and mitral leaflets calcification. Mild MR. Moderate MS. Transcatheter aortic valve replacement.   - No signs of volume overload on exam  - continue AC, diuretics, home dosing  - BP and HR controlled and stable   - continue routine hemodynamic   - BB, CCB with hold parameters, home dosing   - Monitor and replete lytes, keep K>4, Mg>2.    - DC planning per primary   - All other medical needs as per primary team.  - Other cardiovascular workup will depend on clinical course.  - Will continue to follow.    Laney Warner [facility]  Nurse Practitioner - Cardiology   Spectra #[phone]

## 2022-03-25 NOTE — PROGRESS NOTE ADULT - PROBLEM SELECTOR PLAN 2
Per Vascular- Has strong palpable DP pulses. ABIs were reviewed and are normal. No further intervention needed. Cleared for podiatric intervention.
Per Vascular- Pt has strong palpable DP pulses. ABIs were reviewed and are normal. No further intervention needed. Cleared for podiatric intervention.
Per Vascular- Pt has strong palpable DP pulses. ABIs were reviewed and are normal. No further intervention is needed. Cleared for podiatric intervention.
Per Vascular- Strong palpable DP pulses. ABIs were reviewed and are normal. No further intervention needed. Cleared for podiatric intervention.

## 2022-03-25 NOTE — PROGRESS NOTE ADULT - REASON FOR ADMISSION
Diabetic foot ulcer

## 2022-03-25 NOTE — PROGRESS NOTE ADULT - NS ATTEND AMEND GEN_ALL_CORE FT
-there is no evidence of acute ischemia.  -there is no evidence of significant arrhythmia.  -there is no evidence for meaningful  volume overload.  -bp controlled  -dc planning
-there is no evidence of acute ischemia.  -there is no evidence of significant arrhythmia.  -there is no evidence for meaningful  volume overload.  -optimized for planned low risk podiatric procedure
-there is no evidence of acute ischemia.  -there is no evidence of significant arrhythmia.  -there is no evidence for meaningful  volume overload.  -s/p low risk podiatric procedure and tolerated it well

## 2022-03-25 NOTE — PROGRESS NOTE ADULT - PROBLEM SELECTOR PROBLEM 2
At high risk for peripheral vascular dysfunction

## 2022-03-25 NOTE — PROGRESS NOTE ADULT - SUBJECTIVE AND OBJECTIVE BOX
[facility]  INFECTIOUS DISEASES   [address] [place] [address]  Tel: [phone]     Fax: [phone]  ======================================================  MD Clement Lira Kaushal, MD Cho, Michelle, MD   ======================================================    MRN-[id number]  JUANIS DE SANTIAGO     Follow up ; Foot ulcer and cellulitis    Had 2nd toe arthroplasty bilaterally on 3/23.   Bone scan negative.   No fever or any new complaint.   No pain.     PAST MEDICAL & SURGICAL HISTORY:  HTN (hypertension)  HLD (hyperlipidemia)  GERD (gastroesophageal reflux disease)  Cardiomyopathy  MSSA bacteremia  9/2021  Acute osteomyelitis  Pulmonary embolism  Pancreas cancer  chemo, s/p Whipple  [place] (hard of hearing)  B/L hearing aids  History of total hip replacement  left X 1, 2 revisions  History of laminectomyX3  ICD (implantable cardiac defibrillator) in place  implanted 2005, replaced 10/4/2021 [place] [employer] Subcutaneous ICD  Benign testicular tumor radiation  Status post amputation of toe of right foot8/2021  Status post fracture of femur  2017 left with hardware  S/P TAVR (transcatheter aortic valve replacement)7/2021  H/O Whipple procedure2/2021, 10/2021    Social Hx: no current smoking, ETOH or drugs     FAMILY HISTORY:  Family history of stroke (Mother)    Allergies  Dilaudid (Anaphylaxis; Hives)    Antibiotics:  zosyn     REVIEW OF SYSTEMS:  CONSTITUTIONAL:  No Fever or chills  HEENT:  No diplopia or blurred vision.  No sore throat or runny nose.  CARDIOVASCULAR:  No chest pain or SOB.  RESPIRATORY:  No cough, shortness of breath, PND or orthopnea.  GASTROINTESTINAL:  No nausea, vomiting or diarrhea.  GENITOURINARY:  No dysuria, frequency or urgency. No Blood in urine  MUSCULOSKELETAL:  no joint aches, no muscle pain  SKIN:  foot ulcer   NEUROLOGIC:  No paresthesias, fasciculations, seizures or weakness.  PSYCHIATRIC:  No disorder of thought or mood.  ENDOCRINE:  No heat or cold intolerance, polyuria or polydipsia.  HEMATOLOGICAL:  No easy bruising or bleeding.     Physical Exam:  Vital Signs Last 24 Hrs  T(C): 36.6 (25 Mar 2022 05:25), Max: 36.9 (24 Mar 2022 20:41)  T(F): 97.8 (25 Mar 2022 05:25), Max: 98.4 (24 Mar 2022 20:41)  HR: 70 (25 Mar 2022 05:25) (61 - 70)  BP: 123/71 (25 Mar 2022 05:25) (113/66 - 123/71)  BP(mean): --  RR: 17 (25 Mar 2022 05:25) (17 - 17)  SpO2: 96% (25 Mar 2022 05:25) (93% - 96%)  GEN: NAD, obese   HEENT: normocephalic and atraumatic. EOMI. PERRL.    NECK: Supple.  No lymphadenopathy  LUNGS: Clear to auscultation.  HEART: Regular rate and rhythm, Left lower chest AICD  ABDOMEN: Soft, nontender, and nondistended.   Open wound mid abdomen with serous discharge, no sign of infection, healing from edges    No sign of infection or cellulitis.   : No CVA tenderness  EXTREMITIES: R foot s/p hallux amputation, 2nd toe is a hammer toe,   dorsal mid-digit ulcer with bloody purulent discharge and open wound that probes to bone.   NEUROLOGIC: grossly intact.  PSYCHIATRIC: Appropriate affect .  SKIN: No rash    Labs:                        9.3    4.40  )-----------( 164      ( 25 Mar 2022 07:19 )             28.3     03-25    134<L>  |  103  |  15  ----------------------------<  113<H>  4.0   |  25  |  0.72    Ca    9.1      25 Mar 2022 07:19  Phos  3.3     03-25  Mg     2.2     03-25    Culture - Tissue with Gram Stain (collected 03-24-22 @ 00:38)  Source: .Tissue Other, RIGHT 2ND TOE  Gram Stain (03-24-22 @ 03:51):    Rare polymorphonuclear leukocytes seen per low power field    No organisms seen per oil power field    Culture - Other (collected 03-17-22 @ 23:28)  Source: .Other Right foot 2nd digit  Final Report (03-20-22 @ 16:48):    Moderate Citrobacter koseri    Few Klebsiella pneumoniae    Few Enterococcus faecalis    Normal skin davonte isolated  Organism: Citrobacter koseri  Klebsiella pneumoniae  Enterococcus faecalis (03-20-22 @ 16:48)  Organism: Enterococcus faecalis (03-20-22 @ 16:48)    Sensitivities:      -  Ampicillin: S <=2 Predicts results to ampicillin/sulbactam, amoxacillin-clavulanate and  piperacillin-tazobactam.      -  Tetra/Doxy: R >8      -  Vancomycin: S 1      Method Type: TYSON  Organism: Klebsiella pneumoniae (03-20-22 @ 16:48)    Sensitivities:      -  Amikacin: S <=16      -  Amoxicillin/Clavulanic Acid: S <=8/4      -  Ampicillin: R 16 These ampicillin results predict results for amoxicillin      -  Ampicillin/Sulbactam: S <=4/2 Enterobacter, Klebsiella aerogenes, Citrobacter, and Serratia may develop resistance during prolonged therapy (3-4 days)      -  Aztreonam: S <=4      -  Cefazolin: S <=2 Enterobacter, Klebsiella aerogenes, Citrobacter, and Serratia may develop resistance during prolonged therapy (3-4 days)      -  Cefepime: S <=2      -  Cefoxitin: S <=8      -  Ceftriaxone: S <=1 Enterobacter, Klebsiella aerogenes, Citrobacter, and Serratia may develop resistance during prolonged therapy      -  Ciprofloxacin: S <=0.25      -  Ertapenem: S <=0.5      -  Gentamicin: S <=2      -  Imipenem: S <=1      -  Levofloxacin: S <=0.5      -  Meropenem: S <=1      -  Piperacillin/Tazobactam: S <=8      -  Tobramycin: S <=2      -  Trimethoprim/Sulfamethoxazole: S <=0.5/9.5      Method Type: TYSON  Organism: Citrobacter koseri (03-20-22 @ 16:48)    Sensitivities:      -  Amikacin: S <=16      -  Amoxicillin/Clavulanic Acid: S <=8/4      -  Ampicillin: R >16 These ampicillin results predict results for amoxicillin      -  Ampicillin/Sulbactam: S <=4/2 Enterobacter, Klebsiella aerogenes, Citrobacter, and Serratia may develop resistance during prolonged therapy (3-4 days)      -  Aztreonam: S <=4      -  Cefazolin: S <=2 Enterobacter, Klebsiella aerogenes, Citrobacter, and Serratia may develop resistance during prolonged therapy (3-4 days)      -  Cefepime: S <=2      -  Cefotaxime: S <=2      -  Cefoxitin: S <=8      -  Ceftazidime: S <=1      -  Ceftriaxone: S <=1 Enterobacter, Klebsiella aerogenes, Citrobacter, and Serratia may develop resistance during prolonged therapy      -  Cefuroxime: S 8      -  Ciprofloxacin: S <=0.25      -  Ertapenem: S <=0.5      -  Gentamicin: S <=2      -  Imipenem: S <=1      -  Levofloxacin: S <=0.5      -  Meropenem: S <=1      -  Minocycline: S <=4      -  Piperacillin/Tazobactam: S <=8      -  Tigecycline: S <=2      -  Tobramycin: S <=2      -  Trimethoprim/Sulfamethoxazole: S <=0.5/9.5      Method Type: TYSON    Culture - Blood (collected 03-17-22 @ 22:01)  Source: .Blood Blood-Peripheral  Final Report (03-22-22 @ 23:00):    No Growth Final    Culture - Blood (collected 03-17-22 @ 22:01)  Source: .Blood Blood-Peripheral  Final Report (03-22-22 @ 23:00):    No Growth Final    WBC Count: 4.40 K/uL (03-25-22 @ 07:19)  WBC Count: 3.70 K/uL (03-24-22 @ 07:47)  WBC Count: 3.09 K/uL (03-23-22 @ 05:49)  WBC Count: 3.20 K/uL (03-22-22 @ 07:16)  WBC Count: 4.06 K/uL (03-21-22 @ 06:52)    Creatinine, Serum: 0.72 mg/dL (03-25-22 @ 07:19)  Creatinine, Serum: 0.92 mg/dL (03-24-22 @ 07:47)  Creatinine, Serum: 0.88 mg/dL (03-23-22 @ 05:49)  Creatinine, Serum: 0.75 mg/dL (03-22-22 @ 07:16)  Creatinine, Serum: 0.71 mg/dL (03-21-22 @ 06:52)    C-Reactive Protein, Serum: 15 mg/L (03-17-22 @ 21:53)    Sedimentation Rate, Erythrocyte: 56 mm/hr (03-17-22 @ 17:54)    COVID-19 PCR: NotDetec (03-23-22 @ 07:28)  COVID-19 PCR: NotDetec (03-17-22 @ 17:54)  COVID-19 PCR: NotDetec (03-16-22 @ 19:16)    All imaging and other data have been reviewed.  < from: Xray Foot AP + Lateral + Oblique, Right (03.17.22 @ 17:36) >  ACC: [id number] EXAM:  XR CHEST PORTABLE IMMED 1V                        ACC: [id number] EXAM:  XR FOOT COMP MIN 3 VIEWS RT                        PROCEDURE DATE:  03/17/2022    INTERPRETATION:  Right foot and chest. Patient has an infection ofthe   second toe.  Right foot. 3 views.  Again noted is a well-healed amputation of the distal first metatarsal.  There is mild hammertoe deformity but likely of the second toe. No active   bone destruction or fracture evident. Findings similarto October 28, 2021.  AP semierect chest on March 17, 2022 at 5:18 PM.  Elevated diaphragms crowds the chest.  Heart magnified by technique.  Left epicardial pacemaker and very extensive thoracolumbar hardware again   noted.  Persistent mild left base atelectasis.  Findings similar to CAT scan of March 7 of this year.  Temporary aortic valve again noted.  IMPRESSION: Stable right foot findings. Persistent minimal atelectasis   left base. Other findings as above.    Assessment and Plan:   79 yo man with PMH of HTN, HFrEF, CAD, Vtach with AICD, TAVR, PEs, Spinal stenosis, BPH and Left THR with revisions, pancreatic cancer (dx 3/2021, chemo) s/p ERCP s/p whipple 3/2021 and 10/26/21, and Osteomyelitis of R hallux s/p amputation 9/202 and MSSA bacteremia was admitted from wound center with R 2nd toe chronic wound and infection.   Last time hallux infection showed MSSA causing bacteremia and after surgery tissue culture was pseudomonas and staph Pettenkoferi (Oxacillin Sensitive).  ESR 56 and CRP 15   Xray with no bone involvement     He had pseudomonas and staph aureus and staph Pettenkoferi both Sensitive, in last admission and has citrobacter, Klebsiella and enterococcus.     R 2nd toe cellulitis and chronic wound   - Blood culture x 2 NGTD  - Wound culture with citrobacter, Klebsiella and enterococcus    - Bone scan negative for OM  - Podiatry and vascular are on the case  - S/p Bilateral 2nd toe arthroplasty, bone biopsy and cultures are sent as well, will follow results in wound center.   - Continue Augmentin 875mg q12 orally for 3 more days  - Appt in wound center in one week.     Will sign off please call with any question.     Jaren Bell MD  Division of Infectious Diseases   Please call ID service at [phone] with any question.      35 minutes spent on total encounter assessing patient, examination, chart reivew, counseling and coordinating care by the attending physician/nurse/care manager.

## 2022-03-25 NOTE — PROGRESS NOTE ADULT - PROBLEM SELECTOR PLAN 1
Pt seen and evaluated.  s/p b/l 2nd digit arthroplasties and right 2nd digit flexor tenotomy POD #2  f/u OR bone culture, pathology and clean margin for right 2nd digit, and OR bone pathology for left 2nd digit  Per ID- Continue Augmentin 875mg q12 orally for 3 more days. Will follow OR results outpt.  Pt is stable for d/c from Podiatry standpoint, will follow up OR bone culture and pathology results as outpt in the Wound Care Center.    WOUND CARE INSTRUCTIONS  1. Please leave dressing clean, dry and intact until follow-up. Monitor for any signs of infection and present to the ED if they arise.  2. If the dressing gets wet, please change with dry, sterile dressing.  3. Patient can be weight bearing as tolerated in surgical shoes.  4. Patient is to follow up in the [facility] with Dr. Felipe or Dr. López within 3-5 days upon discharge. Please call [phone] as soon as discharged and state that it is for a "post-op appointment".

## 2022-03-25 NOTE — PROGRESS NOTE ADULT - SUBJECTIVE AND OBJECTIVE BOX
HPI:  81 year old Male with PMHx of pancreatic cancer (dx 3/2021, chemo) s/p ERCP s/p whipple 3/2021 and 10/26/21, HTN, HLD, HFrEF (Oct 2021 EF: 35%), CAD s/p stents x2 (~15 years ago), Vtach with AICD (implanted 2005, extracted and re-implantation 9/2021 and 10/4/21), TAVR (4/2021), bilateral PE (7/2021) on Xarelto, spinal stenosis, macular degeneration, GERD, BPH, Left THR (x2 revisions), left femur fracture (hardware), osteomyelitis s/p R hallux amputation 9/2021, MSSA/VRE bacteremia seen bedside today s/p b/l 2nd digit arthroplasties (DOS: 3/23/22). Denies any pain in both feet. Dressings were clean, dry and intact. Denies any fever, chills, nausea, vomiting, chest pain, shortness of breath, or calf pain at this time.    PAST MEDICAL & SURGICAL HISTORY:  HTN (hypertension)    HLD (hyperlipidemia)    GERD (gastroesophageal reflux disease)    Cardiomyopathy    MSSA bacteremia  9/2021    Acute osteomyelitis    Pulmonary embolism    Pancreas cancer  chemo    [place] (hard of hearing)  B/L hearing aids    History of total hip replacement  left X 1, 2 revisions    History of laminectomy  X3    ICD (implantable cardiac defibrillator) in place  implanted 2005, replaced 10/4/2021 [employer] Subcutaneous ICD    Benign testicular tumor  radiation    Status post amputation of toe of right foot  8/2021    Status post fracture of femur  2017 left with hardware    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    H/O Whipple procedure  2/2021    Allergies    Dilaudid (Anaphylaxis; Hives)    FAMILY HISTORY:  Family history of stroke (Mother)    MEDICATIONS  (STANDING):  aluminum hydroxide/magnesium hydroxide/simethicone Suspension 30 milliLiter(s) Oral every 6 hours  aMIOdarone    Tablet 200 milliGRAM(s) Oral daily  amoxicillin  875 milliGRAM(s)/clavulanate 1 Tablet(s) Oral every 12 hours  ascorbic acid 500 milliGRAM(s) Oral daily  brimonidine 0.2% Ophthalmic Solution 1 Drop(s) Both EYES two times a day  dextrose 40% Gel 15 Gram(s) Oral once  dextrose 5% + sodium chloride 0.9%. 1000 milliLiter(s) (25 mL/Hr) IV Continuous <Continuous>  dextrose 50% Injectable 25 Gram(s) IV Push once  dextrose 50% Injectable 12.5 Gram(s) IV Push once  dextrose 50% Injectable 25 Gram(s) IV Push once  DULoxetine 60 milliGRAM(s) Oral daily  furosemide    Tablet 20 milliGRAM(s) Oral daily  glucagon  Injectable 1 milliGRAM(s) IntraMuscular once  insulin lispro (ADMELOG) corrective regimen sliding scale   SubCutaneous three times a day before meals  insulin lispro (ADMELOG) corrective regimen sliding scale   SubCutaneous at bedtime  latanoprost 0.005% Ophthalmic Solution 1 Drop(s) Both EYES at bedtime  metoprolol succinate ER 50 milliGRAM(s) Oral daily  multivitamin/minerals 1 Tablet(s) Oral daily  pancrelipase  (CREON 36,000 Lipase Units) 3 Capsule(s) Oral three times a day with meals  pantoprazole    Tablet 40 milliGRAM(s) Oral before breakfast  rivaroxaban 20 milliGRAM(s) Oral with dinner  tamsulosin 0.4 milliGRAM(s) Oral at bedtime  timolol 0.5% Solution 1 Drop(s) Both EYES every 12 hours  traMADol 25 milliGRAM(s) Oral at bedtime    MEDICATIONS  (PRN):  acetaminophen     Tablet .. 650 milliGRAM(s) Oral every 6 hours PRN Temp greater or equal to 38C (100.4F), Mild Pain (1 - 3)  ALPRAZolam 0.25 milliGRAM(s) Oral at bedtime PRN Insomnia  ondansetron Injectable 4 milliGRAM(s) IV Push every 6 hours PRN Nausea and/or Vomiting  traMADol 25 milliGRAM(s) Oral three times a day PRN Moderate Pain (4 - 6)    Vital Signs Last 24 Hrs  T(C): 36.7 (25 Mar 2022 12:01), Max: 36.9 (24 Mar 2022 20:41)  T(F): 98.1 (25 Mar 2022 12:01), Max: 98.4 (24 Mar 2022 20:41)  HR: 69 (25 Mar 2022 12:01) (61 - 70)  BP: 106/62 (25 Mar 2022 12:01) (106/62 - 123/71)  BP(mean): --  RR: 17 (25 Mar 2022 12:01) (17 - 17)  SpO2: 97% (25 Mar 2022 12:01) (93% - 97%)    PHYSICAL EXAM:  Vascular: DP palpable b/l, PT non-palpable b/l, Capillary refill 3 seconds to remaining digits, Digital hair absent bilaterally, right 2nd digit edema, skin temp wnl b/l.  Neurological: Light touch sensation diminished bilaterally.  Musculoskeletal: s/p right partial ray amputation. Hammered digits b/l. s/p b/l 2nd digit arthroplasties. AJ & STJ ROM intact.  Dermatological: Surgical incision to b/l 2nd digits with sutures intact, no active drainage.    CBC Full  -  ( 25 Mar 2022 07:19 )  WBC Count : 4.40 K/uL  RBC Count : 2.98 M/uL  Hemoglobin : 9.3 g/dL  Hematocrit : 28.3 %  Platelet Count - Automated : 164 K/uL  Mean Cell Volume : 95.0 fl  Mean Cell Hemoglobin : 31.2 pg  Mean Cell Hemoglobin Concentration : 32.9 gm/dL  Auto Neutrophil # : x  Auto Lymphocyte # : x  Auto Monocyte # : x  Auto Eosinophil # : x  Auto Basophil # : x  Auto Neutrophil % : x  Auto Lymphocyte % : x  Auto Monocyte % : x  Auto Eosinophil % : x  Auto Basophil % : x    03-25    134<L>  |  103  |  15  ----------------------------<  113<H>  4.0   |  25  |  0.72    Ca    9.1      25 Mar 2022 07:19  Phos  3.3     03-25  Mg     2.2     03-25

## 2022-03-28 NOTE — DISEASE MANAGEMENT
[Clinical] : TNM Stage: c [N/A] : Currently not applicable [FreeTextEntry4] : pancreatic adeno [TTNM] : x [NTNM] : x [MTNM] : x

## 2022-03-28 NOTE — H&P ADULT - PROBLEM SELECTOR PLAN 9
Chronic, diagnosed in March 2021 on chemotherapy (does not recall name of medication)  - Continue home medication Pancrelipase 36,000 2 tabs PO TID with meals  - Pt scheduled for Whipple's procedure on 9/23/2021 with Dr. Shon Murphy at [place] no concerns

## 2022-03-28 NOTE — HISTORY OF PRESENT ILLNESS
[FreeTextEntry1] : 79yo M w/ multiple extensive list of comorbidities, previous radiation for testicular cancer aprox 30 years ago and pancreatic cancer s/p Scott/ Abraxane at outside institution and s/p Whipple surgery w/ Dr. Mercer on 10/26/2021. Pathology showing ypT3N0, and positive margins at vascular margin and pancreatic resection margin. had 5mo adjuvant chemo.  He is now s/p 4000 cGy pancreatic SBRT in 5fx from 1/11/22 - 1/19/22. \par \par 3/7/22 - CT C/A/P:    Status post Whipple procedure. No evidence of local tumor recurrence.\par New indeterminate 1.1 cm right upper lobe groundglass nodule, possibly infectious/inflammatory. Short-term follow-up chest CT suggested within 3 months.\par Rectus diastases, with broad-based ventral abdominal bulge, more prominent than on the prior study. Fat-containing right inguinal hernia again noted. No acute findings to explain the patient's abdominal pain.\par Serpiginous sclerosis in the right femoral head, question AVN.\par \par \par Dr. Juan holding chemo due to kps, plan imaging 6/22. \par

## 2022-03-30 NOTE — ADDENDUM
[FreeTextEntry1] : The pt. presented to [facility] ambulating with walker and A&Ox3 and late for scheduled HBOT.\par The pt's pre-dive screening included overview of HBOT prior to first tx.\par \par The pt. was provided re-enforcement of ear equalization techniques, communication with tech. during HBOT, wound care process, and other topics r/t HBOT. \par The pt. was provided opportunity to ask questions, had all questions answered to satisfaction, and consented to begin initial HBOT. \par \par The pt's eye glasses were cleared for use during HBOT.\par The pt's hearing aids were removed prior to start of HBOT.\par The pt. was provided mild off-loading to B/L knees at his request prior to start of HBOT. \par Pt. expressed comfort and satisfaction after knee roll placement. \par \par Pt's daughter, NITIN, stated pt. had L ear ET tube placement x ENT. \par \par The pt. began descent @ 2.2 PSI/min. towards Rx'd HBOT tx. depth of 2.4 UMER in chamber # 2. \par @ approx. 3 PSI(g), the pt. c/o inability to equalize R ear (R ear squeeze). \par Pt's descent was halted and pt. was allowed period of time to perform equalization techniques. \par Pt. was coached on equalization techniques and expressed success with same after period of time. \par Pt. consented to resume descent. \par The above process was repeated at intervals at pressures of 5, 8, 10, and 15 PSI(g), with the pt. successfully equalizing R ear and consenting to resume descent.  \par The pt. denied any issue equalizing L ear throughout descent.\par \par The pt. arrived @ HBOT tx. depth and began HBOT tx\par The pt. was observed with visible chest motion and without incident for the duration of observed HBOT.\par The pt. was administered intermittent med. air over course of HBOT without incident. \par At the conclusion of the pt's second med. air interval, the pt. c/o feeling cold.\par PVHO ventilation rate was reduced from 275 to 175 lpm. Pt. expressed improved comfort post-ventilation decrease. \par The pt. ascended from tx. depth to surface without incident.\par The pt. tolerated HBOT.\par The pt's ears were evaluated post-tx.\par The pt's daughter was advised that pt. may consider OTC nasal decongestant if R ear discomfort continues. \par The pt. received wound care x Nurse post-HBOT.

## 2022-03-30 NOTE — ASSESSMENT
[Patient prepared for dive] : Patient prepared for dive [Patient descended without problem for 9 minutes] : Patient descended without problem for 9 minutes [No dizziness or thirst] :  No dizziness or thirst [No ear problems] : No ear problems [Vital signs stable] : Vital signs stable [Tolerating dive well] : Tolerating dive well [No Chest Pain, shortness of breath] : No Chest Pain, shortness of breath [Respiratory Rate Stable] : Respiratory Rate Stable [No chest pain, shortness of breath, or ear pain] :  No chest pain, shortness of breath, or ear pain  [Tolerated Ascent well] : Tolerated Ascent well [Vital Signs stable] : Vital Signs stable [A physician was present throughout the entire HBOT] : A physician was present throughout the entire HBOT [No] : No [Clinically Stable] : Clinically stable [Continue Treatment Plan] : Continue treatment plan [No change from previous assessment] : No change from previous assessment [0] : 0 out of 10 [FreeTextEntry2] : Tolerated treatment at 2.4 UMER without issue\par Pre and post vital signs within acceptable parameters

## 2022-03-30 NOTE — ASSESSMENT
[No change from previous assessment] : No change from previous assessment [Patient prepared for dive] : Patient prepared for dive [Patient undergoing HBO treatment for __________] : Patient undergoing HBO treatment for [unfilled] [No dizziness or thirst] :  No dizziness or thirst [Vital signs stable] : Vital signs stable [Tolerating dive well] : Tolerating dive well [No Chest Pain, shortness of breath] : No Chest Pain, shortness of breath [Respiratory Rate Stable] : Respiratory Rate Stable [No chest pain, shortness of breath, or ear pain] :  No chest pain, shortness of breath, or ear pain  [Tolerated Ascent well] : Tolerated Ascent well [Vital Signs stable] : Vital Signs stable [A physician was present throughout the entire HBOT] : A physician was present throughout the entire HBOT [Yes] : Yes [Clinically Stable] : Clinically stable [Continue Treatment Plan] : Continue treatment plan [FreeTextEntry1] : some ear wax noted left side. Patient had trouble clearing left ear with dive [FreeTextEntry2] : minor difficulty clearing left ear

## 2022-03-30 NOTE — ADDENDUM
[FreeTextEntry1] : PT ARRIVED A&OX3 \par ALL VITALS WITHIN PARAMETERS FOR HBOT \par The pt. was provided elevation/off-loading measure to B/L lower extremities and expressed comfort after placement of same prior to start of HBOT.\par PT DESCENDED TO 2.4 UMER @ 2.2 PSI/MIN IN CHAMBER #3 WITHOUT INCIDENT\par PT RESTING AT TX DEPTH WITH VISIBLE CHEST RISE AND FALL OBSERVED CHAMBER SIDE\par PT TOLERATED AIR BREAKS WELL\par PT ASCENDED FROM 2.4 UMER @ 2.2 PSI/MIN WITHOUT INCIDENT\par PT TOLERATED TX WELL\par Transfer of Observation from  to [facility] upon pt's extrication from HBO [place].\par The pt. was observed to have an area of serosanguineous strike-through on the medial dorsal aspect of his R foot. \par Nurse was advised of same.\par Nurse performed wound care on the pt's feet only. \par The pt's abdominal bandage was observed to have only mild strike-through. \par Per Nurse, pt. may wait until tomorrow for scheduled bandage change of abdominal wound.\par \par Nurse sought physician for pt's lower extremity wounds. \par Per physician, the pt should be assessed x DPM tomorrow. \par After speaking with MD, the pt. questioned whether bleeding or swelling of lower extremities was r/t HBOT.\par The pt. was advised that one of the primary short-term actions of HBOT is to provide oxygenation and improved WBC potential to areas where circulation is compromised, and that same can often lead to acute bleeding, drainage, pain, and/or swelling -- especially in the lower extremities -- as some of the body's natural actions improve in their effectiveness during and immediately after HBOT.\par The pt. was advised that DPM will perform assessment of the pt's wounds tomorrow.\par The pt. expressed understanding of and agreed to same.

## 2022-03-30 NOTE — PROCEDURE
[Outpatient] : Outpatient [Ambulatory] : Patient is ambulatory. [Walker] : walker [THIS CHAMBER HAS BEEN CLEANED / DISINFECTED] : This chamber has been cleaned / disinfected according to local and hospital policy and procedure prior to this treatment. [____] : Post-Dive: Time - [unfilled] [___] : Post-Dive: Value - [unfilled] mg/dL [Patient demonstrated and verbalized proper technique for using air break mask] : Patient demonstrated and verbalized proper technique for using air break mask [Patient educated on the risks of SMOKING prior to HBOT with understanding] : Patient educated on the risks of SMOKING prior to HBOT with understanding [Patient educated on the risks of CONSUMING ALCOHOL prior to HBOT with understanding] : Patient educated on the risks of CONSUMING ALCOHOL prior to HBOT with understanding [100% Cotton] : 100% cotton [Empty all pockets] : empty all pockets [No hair oils, wigs, hairpieces, pins] : no hair oils, wigs, hairpieces, pins  [Pre tx medications] : pre tx medications  [No make-up, creams] : no make-up, creams  [No jewelry] : no jewelry  [No matches, cigarettes, lighters] : no matches, cigarettes, lighters  [Hearing aid removed] : hearing aid removed [Dentures removed] : dentures removed [Ground bracelet on pt's wrist] : ground bracelet on pt's wrist  [Contacts removed] : contacts removed  [Remove nail polish] : remove nail polish  [No reading material] : no reading material  [Bra, undergarments removed] : bra, undergarments removed  [No contraindicated dressings] : no contraindicated dressings [Ground Wire - VISUAL Verification - Intact/Free of Obstruction] : Ground Wire - VISUAL Verification - Intact/Free of Obstruction  [Ground Continuity - Verified < 1 ohm w/ Wrist Strap Eagle] : Ground Continuity - Verified < 1 ohm w/ Wrist Strap Eagle [Number: ___] : Number: [unfilled] [Diagnosis: ___] : Diagnosis: [unfilled] [Clear all fields] : clear all fields [] : No [FreeTextEntry4] : 100 [FreeTextEntry6] : 14 : 56 [FreeTextEntry8] : 15 : 14 [de-identified] : 15 : 44 [de-identified] : 15 : 49 [de-identified] : 16 : 19 [de-identified] : 16 : 24 [de-identified] : 16 : 54 [de-identified] : 17 : 04 [de-identified] : 128 min.  [de-identified] : Rene, 3/11/2022 ; + ENT Clearance [de-identified] : Christopher post-tx.

## 2022-03-30 NOTE — PROCEDURE
[Outpatient] : Outpatient [Ambulatory] : Patient is ambulatory. [Walker] : walker [THIS CHAMBER HAS BEEN CLEANED / DISINFECTED] : This chamber has been cleaned / disinfected according to local and hospital policy and procedure prior to this treatment. [Patient demonstrated and verbalized proper technique for using air break mask] : Patient demonstrated and verbalized proper technique for using air break mask [Patient educated on the risks of SMOKING prior to HBOT with understanding] : Patient educated on the risks of SMOKING prior to HBOT with understanding [Patient educated on the risks of CONSUMING ALCOHOL prior to HBOT with understanding] : Patient educated on the risks of CONSUMING ALCOHOL prior to HBOT with understanding [100% Cotton] : 100% cotton [Empty all pockets] : empty all pockets [No hair oils, wigs, hairpieces, pins] : no hair oils, wigs, hairpieces, pins  [Pre tx medications] : pre tx medications  [No make-up, creams] : no make-up, creams  [No jewelry] : no jewelry  [No matches, cigarettes, lighters] : no matches, cigarettes, lighters  [Hearing aid removed] : hearing aid removed [Dentures removed] : dentures removed [Ground bracelet on pt's wrist] : ground bracelet on pt's wrist  [Contacts removed] : contacts removed  [Remove nail polish] : remove nail polish  [No reading material] : no reading material  [Bra, undergarments removed] : bra, undergarments removed  [No contraindicated dressings] : no contraindicated dressings [Ground Wire - VISUAL Verification - Intact/Free of Obstruction] : Ground Wire - VISUAL Verification - Intact/Free of Obstruction  [Ground Continuity - Verified < 1 ohm w/ Wrist Strap Eagle] : Ground Continuity - Verified < 1 ohm w/ Wrist Strap Eagle [Clear all fields] : clear all fields [Number: ___] : Number: [unfilled] [Diagnosis: ___] : Diagnosis: [unfilled] [____] : Post-Dive: Time - [unfilled] [___] : Post-Dive: Value - [unfilled] mg/dL [] : No [FreeTextEntry4] : 100 [FreeTextEntry6] : 3383 [FreeTextEntry8] : 9997 [de-identified] : 6816 [de-identified] : 3947 [de-identified] : 1505 [de-identified] : 3829 [de-identified] : 0605 [de-identified] : 7360 [de-identified] : 120 MINUTES

## 2022-04-01 PROBLEM — S90.414A: Status: ACTIVE | Noted: 2022-01-01

## 2022-04-01 PROBLEM — M20.41 ACQUIRED HAMMERTOE OF RIGHT FOOT: Status: ACTIVE | Noted: 2021-08-04

## 2022-04-01 PROBLEM — M20.42 ACQUIRED HAMMERTOE OF LEFT FOOT: Status: ACTIVE | Noted: 2021-08-04

## 2022-04-01 NOTE — PHYSICAL EXAM
[Abdominal Pad] : Abdominal Pad [4 x 4] : 4 x 4  [Normal Thyroid] : the thyroid was normal [Normal Breath Sounds] : Normal breath sounds [Normal Rate and Rhythm] : normal rate and rhythm [Alert] : alert [Oriented to Person] : oriented to person [Oriented to Place] : oriented to place [Oriented to Time] : oriented to time [Calm] : calm [JVD] : no jugular venous distention  [Abdomen Masses] : No abdominal massess [Abdomen Tenderness] : ~T ~M No abdominal tenderness [Tender] : was nontender [Enlarged] : not enlarged [de-identified] : +ant hernia [de-identified] : elderly WM, NAD, alert, Ox3. [de-identified] : +lg ant hernia. [FreeTextEntry1] : Abdomen [FreeTextEntry2] : 15.0 [FreeTextEntry3] : 7.6 [FreeTextEntry4] : 0.1 [de-identified] : serosanguineous  [de-identified] : 100% [de-identified] : Marlee and Calcium Alginate  [de-identified] : Mechanically cleansed with Sterile gauze and 0.9% Normal Saline\par \par DD, ABD pad, Paper Tape [FreeTextEntry7] : RIght Foot 2nd digit- S/P arthroplasty 3/23/22) [FreeTextEntry8] : 0.8 [FreeTextEntry9] : 2.0 [de-identified] : 0.1 [de-identified] : serosanguineous  [de-identified] : 90% [de-identified] : 10% [de-identified] : Silver Alginate  [de-identified] : Mechanically cleansed with Sterile gauze and 0.9% Normal Saline\par \par Dry Dressing  [de-identified] : 0.1 [de-identified] : Left foot 2nd digit (s/p arthroplasty 3/23/22)  [de-identified] : 2.0 [de-identified] : 0.1 [de-identified] : serous / serosanguineous  [de-identified] : Dry Dressing  [de-identified] : Mechanically cleansed with Sterile gauze and 0.9% Normal Saline\par \par Dry Dressing  [TWNoteComboBox4] : Moderate [TWNoteComboBox5] : No [TWNoteComboBox6] : Other [de-identified] : Erythema [de-identified] : No [de-identified] : None [de-identified] : 100% [de-identified] : None [de-identified] : No [de-identified] : 3x Weekly [de-identified] : Primary Dressing [de-identified] : Small [de-identified] : Surgical [de-identified] : Erythema [de-identified] : None [de-identified] : None [de-identified] : >75% [de-identified] : Yes [de-identified] : 3x Weekly [de-identified] : Primary Dressing [de-identified] : Small [de-identified] : Surgical [de-identified] : Erythema [de-identified] : None [de-identified] : None [de-identified] : No [de-identified] : 100% [de-identified] : 3x Weekly [de-identified] : Primary Dressing

## 2022-04-01 NOTE — ASSESSMENT
[Verbal] : Verbal [Written] : Written [Patient] : Patient [Good - alert, interested, motivated] : Good - alert, interested, motivated [Dressing changes] : dressing changes [Foot Care] : foot care [Skin Care] : skin care [Signs and symptoms of infection] : sign and symptoms of infection [Venous Disease] : venous disease [How and When to Call] : how and when to call [Labs and Tests] : labs and tests [Off-loading] : off-loading [Patient responsibility to plan of care] : patient responsibility to plan of care [Stable] : stable [Home] : Home [Family member] : Family member [Verbalizes knowledge/Understanding] : Verbalizes knowledge/understanding [Pressure relief] : pressure relief [Surgery] : surgery [Hyperbaric Therapy] : hyperbaric therapy [Walker] : Walker [] : No [FreeTextEntry2] : HBOT\par Surgical Procedure [FreeTextEntry4] : DPM reviewed Vascular studies. DPM states Pt doesn't need a Vascular consult.\par Pt had Whipple surgery in November for Pancreatic Cancer. Pt had Chemotherapy prior to surgery. Pt completed radiation.\par Pt received HBOT orientation at today's visit to [facility] for Soft Tissue- delayed radiation injury.\par Pt completed HBOT Consent, watched video, TM Eval, HBOT Checklist, HBOT 2.4 Order, Unit clerk was given Pt's pacemaker card. Submitted paperwork for Authorization.\par Pt already has Authorization submitted for tenotomy & arthroplasty, Right Foot 2nd Digit. Holding off surgical procedures until Abdomen wound is healed.\par  [FreeTextEntry3] : no open wounds

## 2022-04-01 NOTE — HISTORY OF PRESENT ILLNESS
[FreeTextEntry1] : 82 yo WM, here for his weekly assessment. Presently receiving HBO tx for STRN. Tolerating it well. \par    Pt seen by Dr. Felipe, podiatrist regarding his bilat. feet yesterday. Some sutures removed yesterday.\par He extended po abx yesterday.

## 2022-04-01 NOTE — ADDENDUM
[FreeTextEntry1] : PT ARRIVED AX0X3 ASSISTED WITH CANE TO [facility]\par ALL PT VITALS WITHIN PARAMETERS FOR HBOT\par STRIKETHROUGH SEEN ON DORSAL SIDE OF RIGHT FOOT, RN MADE AWARE AND WOUND CARE WAS PERFORMED PRIOR TO PT HBOT\par PT DESCENDED TO TX DEPTH OF 2.4ATA @ 2.2PSI/MIN IN CHAMBER 3 WITHOUT INCIDENT\par PT SEEN RESTING AT DEPTH WITH VISIBLE CHEST RISE AND FALL WITHOUT INCIDENT\par HT transferred care to \par pt tolerated both intermediate air breaks well\par pt ascended from tx depth without incident in chamber #3 \par pt tolerated tx well\par

## 2022-04-01 NOTE — ASSESSMENT
[Verbal] : Verbal [Written] : Written [Demo] : Demo [Patient] : Patient [Caregiver] : Caregiver [Good - alert, interested, motivated] : Good - alert, interested, motivated [Verbalizes knowledge/Understanding] : Verbalizes knowledge/understanding [Dressing changes] : dressing changes [Foot Care] : foot care [Skin Care] : skin care [Pressure relief] : pressure relief [Signs and symptoms of infection] : sign and symptoms of infection [Nutrition] : nutrition [How and When to Call] : how and when to call [Hyperbaric Therapy] : hyperbaric therapy [Off-loading] : off-loading [Patient responsibility to plan of care] : patient responsibility to plan of care [] : Yes [Stable] : stable [Home] : Home [Walker] : Walker [Not Applicable - Long Term Care/Home Health Agency] : Long Term Care/Home Health Agency: Not Applicable [FreeTextEntry2] : [facility] Skin Integrity \par S/S of Infection \par Foot Care\par Antibiotic Compliance \par Elevation and Offloading Compliance \par HBOT\par F/U daily for HBOT and F/U weekly for assessment   [FreeTextEntry4] : Photos taken this visit. MD educated patient on the importance of antibiotic compliance. Pt educated to offload and elevate the lower extremities.\par Pt has completed 3/30 HBO treatments. Pt encouraged to to continue HBOT.\par Pt given contact information for  to determine eligibility to obtain home care aide.\par F/U daily for HBOT and F/U weekly for assessment.

## 2022-04-01 NOTE — VITALS
[] : No [de-identified] : Pt states 0/10 pain at present. [FreeTextEntry5] : Amoxicillin started 3/20/22.

## 2022-04-01 NOTE — PROCEDURE
[Outpatient] : Outpatient [Ambulatory] : Patient is ambulatory. [Walker] : walker [THIS CHAMBER HAS BEEN CLEANED / DISINFECTED] : This chamber has been cleaned / disinfected according to local and hospital policy and procedure prior to this treatment. [____] : Pre-Dive: Time - [unfilled] [___] : Pre-Dive: Value - [unfilled] mg/dL [Patient demonstrated and verbalized proper technique for using air break mask] : Patient demonstrated and verbalized proper technique for using air break mask [Patient educated on the risks of SMOKING prior to HBOT with understanding] : Patient educated on the risks of SMOKING prior to HBOT with understanding [Patient educated on the risks of CONSUMING ALCOHOL prior to HBOT with understanding] : Patient educated on the risks of CONSUMING ALCOHOL prior to HBOT with understanding [100% Cotton] : 100% cotton [No hair oils, wigs, hairpieces, pins] : no hair oils, wigs, hairpieces, pins  [Empty all pockets] : empty all pockets [Pre tx medications] : pre tx medications  [No make-up, creams] : no make-up, creams  [No jewelry] : no jewelry  [No matches, cigarettes, lighters] : no matches, cigarettes, lighters  [Hearing aid removed] : hearing aid removed [Dentures removed] : dentures removed [Ground bracelet on pt's wrist] : ground bracelet on pt's wrist  [Contacts removed] : contacts removed  [Remove nail polish] : remove nail polish  [No reading material] : no reading material  [Bra, undergarments removed] : bra, undergarments removed  [No contraindicated dressings] : no contraindicated dressings [Ground Wire - VISUAL Verification - Intact/Free of Obstruction] : Ground Wire - VISUAL Verification - Intact/Free of Obstruction  [Ground Continuity - Verified < 1 ohm w/ Wrist Strap Eagle] : Ground Continuity - Verified < 1 ohm w/ Wrist Strap Eagle [Diagnosis: ___] : Diagnosis: [unfilled] [Clear all fields] : clear all fields [Number: ___] : Number: [unfilled] [] : No [FreeTextEntry4] : 100 minutes [FreeTextEntry6] : 9646 [FreeTextEntry8] : 9739 [de-identified] : 0945 [de-identified] : 1646 [de-identified] : 2855 [de-identified] : 6876 [de-identified] : 7112 [de-identified] : 8785 [de-identified] : 120 minutes

## 2022-04-01 NOTE — HISTORY OF PRESENT ILLNESS
[FreeTextEntry1] : Pt seen for follow up of right 2nd dorsal PIPJ ulcer down to skin, as well as a new left 2nd dorsal PIPJ ulcer down to skin. Pt also presents with bilateral hammertoe deformities. Pt relates that he has been trying to wear comfortable shoes to prevent irritation to the toes.

## 2022-04-01 NOTE — REVIEW OF SYSTEMS
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

## 2022-04-01 NOTE — PLAN
[FreeTextEntry1] : Patient examined and evaluated at this time.\par Continue local wound care and offloading. Discussed appropriate shoegear and the need to alleviate any pressure to the right and left 2nd digit.\par Pt advised regarding mechanical pressure and discussed with patient and son regarding conservative and surgical treatment plan. pt would benefit from right 2nd digit flexor tenotomy with possible PIPJ arthroplasty when patient is more stable. Currently also has abdominal wound. Patient is a high risk for further amputation, limb loss, sepsis, and death.\par Spent 30 minutes for patient care and medical decision making.\par Patient to follow up in 1 week.\par

## 2022-04-01 NOTE — PHYSICAL EXAM
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 [de-identified] : diminished light touch sensation bilaterally [FreeTextEntry1] : Right Foot 2nd Digit- Resolved [de-identified] : mild [de-identified] : No Treatment  [de-identified] : Cleansed with Normal Saline.  [FreeTextEntry7] : Dorsal Hallux- No open wound [FreeTextEntry8] : 0.8 [de-identified] : reddened area [FreeTextEntry9] : 0.7 [de-identified] : No Treatment  [de-identified] : Cleansed with Normal Saline.  [de-identified] : Dorsal 2nd Digit- No open wounds  [de-identified] : 0.3 [de-identified] : 0.3 [de-identified] : eschar [de-identified] : mild [de-identified] : No Treatment  [de-identified] : Cleansed with Normal Saline.  [de-identified] : False [de-identified] : False [TWNoteComboBox9] : Left [de-identified] : None [de-identified] : Pressure [de-identified] : Erythema [de-identified] : None [de-identified] : Left [de-identified] : Pressure [de-identified] : Erythema [TWNoteComboBox4] : Moderate [TWNoteComboBox6] : Surgical [de-identified] : other [de-identified] : None [de-identified] : 100% [de-identified] : No

## 2022-04-01 NOTE — VITALS
[] : No [de-identified] : 5 [FreeTextEntry1] : laying down- garment [FreeTextEntry3] : Abdomen [FreeTextEntry2] : moving [FreeTextEntry4] : tylenol

## 2022-04-05 NOTE — PROCEDURE
[Outpatient] : Outpatient [Ambulatory] : Patient is ambulatory. [Cane] : cane [THIS CHAMBER HAS BEEN CLEANED / DISINFECTED] : This chamber has been cleaned / disinfected according to local and hospital policy and procedure prior to this treatment. [____] : Pre-Dive: Time - [unfilled] [___] : Pre-Dive: Value - [unfilled] mg/dL [Patient demonstrated and verbalized proper technique for using air break mask] : Patient demonstrated and verbalized proper technique for using air break mask [Patient educated on the risks of SMOKING prior to HBOT with understanding] : Patient educated on the risks of SMOKING prior to HBOT with understanding [Patient educated on the risks of CONSUMING ALCOHOL prior to HBOT with understanding] : Patient educated on the risks of CONSUMING ALCOHOL prior to HBOT with understanding [100% Cotton] : 100% cotton [Empty all pockets] : empty all pockets [No hair oils, wigs, hairpieces, pins] : no hair oils, wigs, hairpieces, pins  [Pre tx medications] : pre tx medications  [No make-up, creams] : no make-up, creams  [No jewelry] : no jewelry  [No matches, cigarettes, lighters] : no matches, cigarettes, lighters  [Hearing aid removed] : hearing aid removed [Dentures removed] : dentures removed [Ground bracelet on pt's wrist] : ground bracelet on pt's wrist  [Contacts removed] : contacts removed  [Remove nail polish] : remove nail polish  [No reading material] : no reading material  [Bra, undergarments removed] : bra, undergarments removed  [No contraindicated dressings] : no contraindicated dressings [Ground Wire - VISUAL Verification - Intact/Free of Obstruction] : Ground Wire - VISUAL Verification - Intact/Free of Obstruction  [Ground Continuity - Verified < 1 ohm w/ Wrist Strap Eagle] : Ground Continuity - Verified < 1 ohm w/ Wrist Strap Eagle [Number: ___] : Number: [unfilled] [Diagnosis: ___] : Diagnosis: [unfilled] [Clear all fields] : clear all fields [] : No [FreeTextEntry4] : 100 MIN [FreeTextEntry6] : 2943 [FreeTextEntry8] : 1434 [de-identified] : 1695 [de-identified] : 9700 [de-identified] : 0928 [de-identified] : 6365 [de-identified] : 1747 [de-identified] : 8616 [de-identified] : 120 MIN

## 2022-04-05 NOTE — ADDENDUM
[FreeTextEntry1] : PT ARRIVED AXOX3 ASSISTED WITH CANE\par ALL PT VITALS WITHIN ACCEPTABLE PARAMETERS FOR SCHEDULED HBOT\par PT REPORTS TAKING NEW PRESCRIBED ANTIBIOTIC WITHOUT ADVERSE EFFECT. \par UPON INSPECTING PT DRESSING ON LEFT FOOT, PT EXPLAINED HE HAD GOTTEN DRESSING WET, AND DRESSING WAS SEEN AS A YELLOW COLOR, RN NOTIFIED AND PT LEFT FOOT DRESSING CHANGED PRIOR TO HBOT\par PT DRESSING ON RIGHT FOOT NOTED TO BE DRY AND IN TACT\par INSPECTION OF ABDOMINAL DRESSING REVEALED SMALL STRIKETHROUGH, RN DETERMINED DRESSING OKAY FOR HBOT,\par PT WAS PROVIDED FOLDED BLANKETS UNDER LEGS AND BEHIND NECK FOR PT COMFORT\par PT DESCENDED TO TX DEPTH OF 2.4ATA @2.2PSI/MIN IN CHAMBER 3 WITHOUT INCIDENT\par PT SEEN RESTING AT DEPTH WITH VISIBLE CHEST RISE AND FALL OBSERVED CHAMBERSIDE\par PT SEEN TOLERATING AIRBREAKS WITHOUT INCIDENT\par COVID OBTAINED POST TX

## 2022-04-07 NOTE — ASSESSMENT
[No change from previous assessment] : No change from previous assessment [Patient prepared for dive] : Patient prepared for dive [Patient descended without problem for 9 minutes] : Patient descended without problem for 9 minutes [No dizziness or thirst] :  No dizziness or thirst [No ear problems] : No ear problems [Tolerating dive well] : Tolerating dive well [No chest pain, shortness of breath, or ear pain] :  No chest pain, shortness of breath, or ear pain  [Tolerated Ascent well] : Tolerated Ascent well [Vital Signs stable] : Vital Signs stable [A physician was present throughout the entire HBOT] : A physician was present throughout the entire HBOT

## 2022-04-08 NOTE — PROCEDURE
[Outpatient] : Outpatient [Ambulatory] : Patient is ambulatory. [Cane] : cane [THIS CHAMBER HAS BEEN CLEANED / DISINFECTED] : This chamber has been cleaned / disinfected according to local and hospital policy and procedure prior to this treatment. [____] : Post-Dive: Time - [unfilled] [___] : Post-Dive: Value - [unfilled] mg/dL [Patient demonstrated and verbalized proper technique for using air break mask] : Patient demonstrated and verbalized proper technique for using air break mask [Patient educated on the risks of SMOKING prior to HBOT with understanding] : Patient educated on the risks of SMOKING prior to HBOT with understanding [Patient educated on the risks of CONSUMING ALCOHOL prior to HBOT with understanding] : Patient educated on the risks of CONSUMING ALCOHOL prior to HBOT with understanding [100% Cotton] : 100% cotton [Empty all pockets] : empty all pockets [No hair oils, wigs, hairpieces, pins] : no hair oils, wigs, hairpieces, pins  [Pre tx medications] : pre tx medications  [No make-up, creams] : no make-up, creams  [No jewelry] : no jewelry  [No matches, cigarettes, lighters] : no matches, cigarettes, lighters  [Hearing aid removed] : hearing aid removed [Dentures removed] : dentures removed [Ground bracelet on pt's wrist] : ground bracelet on pt's wrist  [Contacts removed] : contacts removed  [Remove nail polish] : remove nail polish  [No reading material] : no reading material  [Bra, undergarments removed] : bra, undergarments removed  [No contraindicated dressings] : no contraindicated dressings [Ground Wire - VISUAL Verification - Intact/Free of Obstruction] : Ground Wire - VISUAL Verification - Intact/Free of Obstruction  [Ground Continuity - Verified < 1 ohm w/ Wrist Strap Eagle] : Ground Continuity - Verified < 1 ohm w/ Wrist Strap Eagle [Number: ___] : Number: [unfilled] [Diagnosis: ___] : Diagnosis: [unfilled] [Clear all fields] : clear all fields [] : No [FreeTextEntry4] : 100 [FreeTextEntry6] : 12 : 02 [FreeTextEntry8] : 12 : 12 [de-identified] : 12 : 42 [de-identified] : 12 : 47 [de-identified] : 13 : 17 [de-identified] : 13 : 22 [de-identified] : 13 : 52 [de-identified] : 14 : 02 [de-identified] : 120 min.

## 2022-04-08 NOTE — ADDENDUM
[FreeTextEntry1] : The pt. presented to [facility] ambulating with cane and A&Ox3 for scheduled HBOT.\par The pt's pre-dive screening was found to be within acceptable limits to begin HBOT.\par The pt. was offered but declined off-loading/elevation measure to lower extremities, including beneath B/L knees. \par \par The pt. was questioned re. equalization during HBOT descent.\par Pt. advised that his R ear is sometimes challenging to clear.\par The pt. was reminded to advise observing chamber /tech. of any difficulty to maximize chance of successful equalization. \par The pt. agreed to same.\par \par The pt. descended @ 2.2 PSI/min. to Rx'd HBOT tx. depth of 2.4 KETURAH in chamber # 4 without incident.\par The pt. was observed with visible chest motion and without incident for the duration of observed HBOT.\par The pt. was administered intermittent med. air over course of HBOT without incident.\par Pt ascended from 2.4 keturah @ 2.2 psi/min without incident\par Pt tolerated tx well\par \par The pt. will receive wound bandage change care post-HBOT. \par Transfer of Observation from [facility] to  @ 13:17.

## 2022-04-08 NOTE — PROCEDURE
[Outpatient] : Outpatient [Ambulatory] : Patient is ambulatory. [Cane] : cane [THIS CHAMBER HAS BEEN CLEANED / DISINFECTED] : This chamber has been cleaned / disinfected according to local and hospital policy and procedure prior to this treatment. [____] : Post-Dive: Time - [unfilled] [___] : Post-Dive: Value - [unfilled] mg/dL [Patient demonstrated and verbalized proper technique for using air break mask] : Patient demonstrated and verbalized proper technique for using air break mask [Patient educated on the risks of SMOKING prior to HBOT with understanding] : Patient educated on the risks of SMOKING prior to HBOT with understanding [Patient educated on the risks of CONSUMING ALCOHOL prior to HBOT with understanding] : Patient educated on the risks of CONSUMING ALCOHOL prior to HBOT with understanding [100% Cotton] : 100% cotton [Empty all pockets] : empty all pockets [No hair oils, wigs, hairpieces, pins] : no hair oils, wigs, hairpieces, pins  [Pre tx medications] : pre tx medications  [No make-up, creams] : no make-up, creams  [No jewelry] : no jewelry  [No matches, cigarettes, lighters] : no matches, cigarettes, lighters  [Hearing aid removed] : hearing aid removed [Dentures removed] : dentures removed [Ground bracelet on pt's wrist] : ground bracelet on pt's wrist  [Contacts removed] : contacts removed  [Remove nail polish] : remove nail polish  [No reading material] : no reading material  [Bra, undergarments removed] : bra, undergarments removed  [No contraindicated dressings] : no contraindicated dressings [Ground Wire - VISUAL Verification - Intact/Free of Obstruction] : Ground Wire - VISUAL Verification - Intact/Free of Obstruction  [Ground Continuity - Verified < 1 ohm w/ Wrist Strap Eagle] : Ground Continuity - Verified < 1 ohm w/ Wrist Strap Eagle [Number: ___] : Number: [unfilled] [Diagnosis: ___] : Diagnosis: [unfilled] [Clear all fields] : clear all fields [] : No [FreeTextEntry4] : 100 [FreeTextEntry6] : 12 : 14 [FreeTextEntry8] : 12 : 24 [de-identified] : 12 : 54 [de-identified] : 12 : 59 [de-identified] : 1965 [de-identified] : 3449 [de-identified] : 9176 [de-identified] : 7250 [de-identified] : 120 MINUTES

## 2022-04-08 NOTE — ADDENDUM
[FreeTextEntry1] : The pt. presented to [facility] ambulating with cane and was A&Ox3 for scheduled HBOT.\par \par Duriing the pt's pre-dive screening, the pt. advised CHT that he would not be at [facility] tomorrow due to a personal obligation. \par \par The pt. was offered alt. HBOT appt. time, but declined, citing a lack of transportation to [facility] that early in the morning. \par The pt. is unable to receive HBOT on SAT. 4/9/22 due to physician coverage restrictions.\par The pt. will resume HBOT on MON. 04/11/2022. \par \par The pt's pre-dive screening found large portion of the dressing to contain serous drainage/strikethrough on the pt's abdominal dressing. \par The pt's B/L feet dressings were observed to be clean and dry, but may be more loosely affixed than desired.\par RN was advised of same.\par Per RN, the pt. will receive at least abdominal dressing change prior to discharge from [facility]. \par \par The pt's pre-dive screening was found to be within acceptable limits to begin HBOT.\par The pt. received mild off-loading measures beneath B/L knees and at B/L feet.\par The pt. expressed comfort in both locations after placement of same. \par The pt. descended @ 2.2 PSI/min. to Rx'd HBOT tx. depth of 2.4 UMER in chamber # 3 without incident.\par The pt. was observed with visible chest motion and without incident for the duration of observed HBOT.\par The pt. was administered intermittent med. air over course of HBOT without incident.\par Transfer of Observation from [facility] to [facility] @ 13 : 10.\par Transfer of Observation from [facility] to [facility] returned @ 13:52, at the start of the pt's ascent.\par The pt. ascended from tx. depth to surface without incident.\par The pt. tolerated HBOT without incident.\par The pt. received COVID-19 PCR SWAB and wound dressing change x Nurse post-HBOT.\par The pt. was provided one day's worth of wound dressing supplies x [facility] Nurse prior to discharge. \par

## 2022-04-08 NOTE — PROCEDURE
[Outpatient] : Outpatient [Ambulatory] : Patient is ambulatory. [Cane] : cane [THIS CHAMBER HAS BEEN CLEANED / DISINFECTED] : This chamber has been cleaned / disinfected according to local and hospital policy and procedure prior to this treatment. [Patient demonstrated and verbalized proper technique for using air break mask] : Patient demonstrated and verbalized proper technique for using air break mask [Patient educated on the risks of SMOKING prior to HBOT with understanding] : Patient educated on the risks of SMOKING prior to HBOT with understanding [Patient educated on the risks of CONSUMING ALCOHOL prior to HBOT with understanding] : Patient educated on the risks of CONSUMING ALCOHOL prior to HBOT with understanding [100% Cotton] : 100% cotton [Empty all pockets] : empty all pockets [No hair oils, wigs, hairpieces, pins] : no hair oils, wigs, hairpieces, pins  [Pre tx medications] : pre tx medications  [No make-up, creams] : no make-up, creams  [No jewelry] : no jewelry  [No matches, cigarettes, lighters] : no matches, cigarettes, lighters  [Hearing aid removed] : hearing aid removed [Dentures removed] : dentures removed [Ground bracelet on pt's wrist] : ground bracelet on pt's wrist  [Contacts removed] : contacts removed  [Remove nail polish] : remove nail polish  [No reading material] : no reading material  [Bra, undergarments removed] : bra, undergarments removed  [No contraindicated dressings] : no contraindicated dressings [Ground Wire - VISUAL Verification - Intact/Free of Obstruction] : Ground Wire - VISUAL Verification - Intact/Free of Obstruction  [Ground Continuity - Verified < 1 ohm w/ Wrist Strap Eagle] : Ground Continuity - Verified < 1 ohm w/ Wrist Strap Eagle [Number: ___] : Number: [unfilled] [Diagnosis: ___] : Diagnosis: [unfilled] [____] : Post-Dive: Time - [unfilled] [___] : Post-Dive: Value - [unfilled] mg/dL [Clear all fields] : clear all fields [] : No [FreeTextEntry4] : 100 [FreeTextEntry6] : 6298 [[facility]] : 2064 [de-identified] : 2142 [de-identified] : 1303 [de-identified] : 5213 [de-identified] : 2208 [de-identified] : 5270 [de-identified] : 8799 [de-identified] : 120 MINUTES

## 2022-04-08 NOTE — ADDENDUM
[FreeTextEntry1] : Pt descended to 2.4 UMER @ 2.2 PSI/MIN without incident in chamber #2.\par Pt resting comfortably @ depth, equal chest rise observed throughout tx.\par Pt care transferred to HT. \par PT TOLERATED AIR BREAKS WELL\par PT ASCENDED FROM 2.4 UMER @ 2.2 PSI/MIN WITHOUT INCIDENT \par PT TOLERATED TX WELL\par PT RECEIVED WOUND CARE POST HBOT \par \par \par \par *Prior to HBOT pt lungs were evaluated by nurse.\par *Prior to HBOT pt had contraindication dressings on, nurse removed and replaced with proper and appropriate dressings. \par *Pts feet were protected and off loaded in chamber with a blanket. \par *Pt to have WC post tx.

## 2022-04-11 NOTE — PROCEDURE
[Outpatient] : Outpatient [Ambulatory] : Patient is ambulatory. [THIS CHAMBER HAS BEEN CLEANED / DISINFECTED] : This chamber has been cleaned / disinfected according to local and hospital policy and procedure prior to this treatment. [Patient demonstrated and verbalized proper technique for using air break mask] : Patient demonstrated and verbalized proper technique for using air break mask [Patient educated on the risks of SMOKING prior to HBOT with understanding] : Patient educated on the risks of SMOKING prior to HBOT with understanding [Patient educated on the risks of CONSUMING ALCOHOL prior to HBOT with understanding] : Patient educated on the risks of CONSUMING ALCOHOL prior to HBOT with understanding [100% Cotton] : 100% cotton [Empty all pockets] : empty all pockets [No hair oils, wigs, hairpieces, pins] : no hair oils, wigs, hairpieces, pins  [Pre tx medications] : pre tx medications  [No make-up, creams] : no make-up, creams  [No jewelry] : no jewelry  [No matches, cigarettes, lighters] : no matches, cigarettes, lighters  [Hearing aid removed] : hearing aid removed [Dentures removed] : dentures removed [Ground bracelet on pt's wrist] : ground bracelet on pt's wrist  [Contacts removed] : contacts removed  [Remove nail polish] : remove nail polish  [No reading material] : no reading material  [Bra, undergarments removed] : bra, undergarments removed  [No contraindicated dressings] : no contraindicated dressings [Ground Wire - VISUAL Verification - Intact/Free of Obstruction] : Ground Wire - VISUAL Verification - Intact/Free of Obstruction  [Ground Continuity - Verified < 1 ohm w/ Wrist Strap Eagle] : Ground Continuity - Verified < 1 ohm w/ Wrist Strap Eagle [Number: ___] : Number: [unfilled] [Diagnosis: ___] : Diagnosis: [unfilled] [____] : Post-Dive: Time - [unfilled] [___] : Post-Dive: Value - [unfilled] mg/dL [Clear all fields] : clear all fields [] : No [FreeTextEntry4] : 100 [FreeTextEntry6] : 4283 [FreeTextEntry8] : 5489 [de-identified] : 6014 [de-identified] : 1869 [de-identified] : 3927 [de-identified] : 5116 [de-identified] : 8193 [de-identified] : 2422 [de-identified] : 120 MINUTES

## 2022-04-11 NOTE — REVIEW OF SYSTEMS
[Negative] : Heme/Lymph [FreeTextEntry8] : open abdominal wound at surgical site without bloody or pus discharge, persistent loose stools after eating [de-identified] : s/p R big toe amputation, s/p R foot ulcer, followed by wound care

## 2022-04-11 NOTE — HISTORY OF PRESENT ILLNESS
[Post-Op Complications] : Post-op complications reported [Intra-abdominal abscess] : intra-abdominal abscess [Wound complications] : wound complications [Invasive drain or angio by CVDL] : Invasive drain or angio by CVDL [FreeTextEntry1] : Mr. Cody Gatica is a 80 year old male with a past medical history of HTN, HLD, CHF (EF: 35%), CAD s/p stents x2 (years ago), AICD (implanted 2005, extracted and re-implantation 9/2021), TAVR (4/2021), bilateral PE (7/2021) on Xarelto, spinal stenosis, macular degeneration, GERD, BPH, Left THR (x2 revisions), left femur fracture (hardware), osteomyelitis right foot s/p right hallux amputation 9/2021, MSSA bacteremia, and pancreatic cancer s/p gem/abraxane completed in 9/21, who presents for follow up visit s/p whipple operation 10/26/21. Pathology revealed a positive pancreatic resection  margin requiring adjuvant RT (SBRT 4000 cGy in 5 fxs) completed in 1/19/22.\par \par CT CAP 12/21/21: \par Impression: Status post Whipple. Interval resolution of previously seen fluid collection adjacent to the afferent jejunal limb. No evidence of recurrent tumor. Few tiny lung nodules, including a 2 mm left lower lobe nodule which could not be visualized on the prior study due to previous atelectasis.\par \par CT CAP 3/7/22: \par Impression: Status post Whipple procedure. No evidence of local tumor recurrence. New indeterminate 1.1 cm right upper lobe groundglass nodule, possibly infectious/inflammatory. Short-term follow-up chest CT suggested within 3 months. Rectus diastases, with broad-based ventral abdominal bulge, more prominent than on the prior study. Fat-containing right inguinal hernia again noted. No acute findings to explain the patient's abdominal pain. Serpiginous sclerosis in the right femoral head, question AVN.\par \par Patient is here today for 3 month follow up visit and evaluation of new open abdominal wound and rectus diastases as seen on imaging. He reports feeling well overall, but notes serous drainage from abdominal wound requiring 1 dressing change per day with ABD pads, without bloody/pus discharge or foul smell. He also admits to continued loose bowel movements after eating. He denies using more than 2 tablets of Creon with each meal or keeping a written food log. He denies fever/chills, n/v, persistent abdominal pain, constipation. \par \par \par \par \par \par \par  [de-identified] : Mr. Cody Gatica is a 80 year old male with a past medical history of HTN, HLD, CHF (EF: 35%), CAD s/p stents x2 (years ago), AICD (implanted 2005, extracted and re-implantation 9/2021), TAVR (4/2021), bilateral PE (7/2021) on Xarelto, spinal stenosis, macular degeneration, GERD, BPH, Left THR (x2 revisions), left femur fracture (hardware), osteomyelitis right foot s/p right hallux amputation 9/2021, MSSA bacteremia, and pancreatic cancer s/p gem/abraxane completed in 9/21, who presents for follow up visit s/p whipple operation 10/26/21. Pathology revealed pancreatic adenocarcinoma, 0/31LN, positive pancreatic resection margin requiring adjuvant RT (SBRT 4000 cGy in 5 fxs) completed in 1/19/22.\par \par CT CAP 12/21/21: \par Impression: Status post Whipple. Interval resolution of previously seen fluid collection adjacent to the afferent jejunal limb. No evidence of recurrent tumor. Few tiny lung nodules, including a 2 mm left lower lobe nodule which could not be visualized on the prior study due to previous atelectasis.\par \par CT CAP 3/7/22: \par Impression: Status post Whipple procedure. No evidence of local tumor recurrence. New indeterminate 1.1 cm right upper lobe groundglass nodule, possibly infectious/inflammatory. Short-term follow-up chest CT suggested within 3 months. Rectus diastases, with broad-based ventral abdominal bulge, more prominent than on the prior study. Fat-containing right inguinal hernia again noted. No acute findings to explain the patient's abdominal pain. Serpiginous sclerosis in the right femoral head, question AVN.\par \par Patient is here today for 3 month follow up visit and evaluation of open abdominal wound and new abdominal bulge. Admits to lifting and working out including core exercise. He reports feeling well overall, but notes serous drainage from abdominal wound requiring 1 dressing change per day with ABD pads, without bloody/pus discharge or foul smell. He also admits to continued loose bowel movements after eating. He denies using more than 2 tablets of Creon with each meal or keeping a written food log. He denies fever/chills, n/v, persistent abdominal pain, constipation. \par

## 2022-04-11 NOTE — ADDENDUM
[FreeTextEntry1] : Pt arrived A&OX3 with the use of a cane\par all vitals within parameters for hbot\par Pt legs offloaded with blanket prior to descent\par Pt descended to 2.4 UMER @ 2.2 PSI/MIN without incident in chamber #3.\par Pt resting comfortably @ depth, equal chest rise observed throughout tx.\par PT TOLERATED AIR BREAKS WELL\par PT ASCENDED FROM 2.4 UMER @ 2.2 PSI/MIN WITHOUT INCIDENT\par PT TOLERATED TX WELL\par PT RECEIVED WC POST HBOT

## 2022-04-11 NOTE — ASSESSMENT
[FreeTextEntry1] : Mr. Cody Gatica is a 80 year old male who presents for follow up visit s/p whipple operation 10/26/21. He has now completed adjuvant RT (SBRT 4000 cGy in 5 fxs) on 1/19/22. He presents for 3 month follow up today with  new development of recuts diastases and open abdominal wound at the surgical site after heavy lifting and working out. \par \par We reviewed his recent CT CAP from 3/7/22 revealing no evidence of local tumor recurrence but with new indeterminate 1.1 cm right upper lobe nodule. We reassured the patient and his family members of these findings as non worrisome at this time and instructed of our recommendation to follow with short term scans. We also reviewed new finding of rectus diastases with broad-based ventral abdominal bulge consistent with his presentation of abdominal wound. We noted that the patient does not currently have a hernia but educated patient and family of the close proximity of the bowel and visible musculature of abdominal wound that could lead to herniation. At this time, the wound is healing well without evidence of infection or herniation. We recommend continued dressing changes as well as use of abdominal binder for additional support. We reviewed that the wound will begin to epithelialize and currently there is no need for surgical intervention. We recommended wound care consultation and evaluation for HBO therapy. We will make a referral for wound care. We discussed his continued post-operative recovery period and restrictions moving forward which include no heavy lifting greater than 10 lbs or strenuous activity. \par \par We also discussed his persistent loose stools associated with meals and reinforced the importance of using Creon as well as food log with both the patient and his family members. We instructed the patient to increase Creon with meals, can take up to 5 tablets with each meal and to continue to keep a food log. He will return to clinic in 3 months after his next CT CAP with medical oncology at that time and continue to follow with radiation oncology as well. \par \par Today, I personally spent 35 minutes in total time including reviewing imaging and studies, discussing complex treatment regimens, direct face to face time with the patient, patient education and counseling.\par  \par

## 2022-04-11 NOTE — PHYSICAL EXAM
[Normal] : oriented to person, place and time, with appropriate affect [de-identified] : open abdominal wound at the surgical site without bloody or pus discharge, nonmalodorous; musculature visualized, pink and moist

## 2022-04-13 NOTE — ASSESSMENT
[No change from previous assessment] : No change from previous assessment [Time MD/Provider assessed Patient:_______] : Time MD/Provider assessed Patient: [unfilled] [Patient prepared for dive] : Patient prepared for dive [Patient undergoing HBO treatment for __________] : Patient undergoing HBO treatment for [unfilled] [Patient descended without problem for 9 minutes] : Patient descended without problem for 9 minutes [No dizziness or thirst] :  No dizziness or thirst [No ear problems] : No ear problems [Vital signs stable] : Vital signs stable [Tolerating dive well] : Tolerating dive well [No Chest Pain, shortness of breath] : No Chest Pain, shortness of breath [Respiratory Rate Stable] : Respiratory Rate Stable [No chest pain, shortness of breath, or ear pain] :  No chest pain, shortness of breath, or ear pain  [Tolerated Ascent well] : Tolerated Ascent well [Vital Signs stable] : Vital Signs stable [A physician was present throughout the entire HBOT] : A physician was present throughout the entire HBOT [No] : No [Clinically Stable] : Clinically stable

## 2022-04-14 NOTE — PHYSICAL EXAM
[Normal Thyroid] : the thyroid was normal [Normal Breath Sounds] : Normal breath sounds [Normal Rate and Rhythm] : normal rate and rhythm [Alert] : alert [Oriented to Person] : oriented to person [Oriented to Place] : oriented to place [Oriented to Time] : oriented to time [Calm] : calm [4 x 4] : 4 x 4  [Abdominal Pad] : Abdominal Pad [JVD] : no jugular venous distention  [Abdomen Masses] : No abdominal massess [Abdomen Tenderness] : ~T ~M No abdominal tenderness [Tender] : was nontender [Enlarged] : not enlarged [de-identified] : elderly WM, NAD, alert, Ox3. [de-identified] : +ant hernia [FreeTextEntry1] : Abdomen  [FreeTextEntry2] : 13.7 [FreeTextEntry3] : 7.2 [FreeTextEntry4] : 0.1 [de-identified] : Serous/sanguinous [de-identified] : Marlee, Calcium alginate  [de-identified] : Mechanically cleansed with sterile gauze and normal saline.\par Paper tape  [FreeTextEntry7] : Right foot 2nd digit - See Dr. Felipe note  [de-identified] : Left foot 2nd digit - See Dr. Felipe note  [TWNoteComboBox4] : Large [de-identified] : Normal [de-identified] : None [de-identified] : None [de-identified] : 100% [de-identified] : No [de-identified] : 3x Weekly [de-identified] : Primary Dressing

## 2022-04-14 NOTE — ADDENDUM
[FreeTextEntry1] : The pt. presented to [facility] ambulating with cane and A&Ox3 for scheduled HBOT.\par The pt's pre-dive screening was found to be within acceptable limits to begin HBOT.\par \par During the pt's pre-dive screening, he c/o sudden onset of pain in abdomen with "swelling" while preparing for bed yesterday evening. The pt. advised that the pt. resolved after taking "a couple of ibuprofin." \par \par The pt. advised that he is still taking PO Antibiotics (for R foot surgical site) and is scheduled to complete same "tomorrow." \par RN was advised of the above. \par Per RN, the pt. will have all of his wound sites evaluated post-tx. during routine wound care performance. Serous drainage was observed in the pt's abdominal wound dressing. \par Per RN, same may also wait until post-tx. for dressing change.\par \par The pt's pre-dive screening was found to be within acceptable limits to begin HBOT.\par The pt. descended @ 2.2 PSI/min. to Rx'd HBOT tx. depth of 2.4 UMER in chamber # 2 without incident.\par The pt. was observed with visible chest motion and without incident for the duration of observed HBOT.\par The pt. was administered intermittent med. air over course of HBOT without incident.\par Transfer of observation from [facility] to  @ 12:30\par PT ASCENDED FROM 2.4 UMER @ 2.2 PSI/MIN WITHOUT INCIDENT\par PT TOLERATED TX WELL\par PT RECEIVED WOUND CARE POST HBOT \par \par The pt. was advised that he is to receive 2x physician assessments tomorrow without HBOT. \par Pt. was encouraged to report abdominal pain/swelling, as well as anticipated end of ABT during assessment with respective physicians.\par Pt. agreed to same. \par Pt. advised to present to [facility] at approx. 12:45 to avoid excess wait. \par Pt. agreed to same, but stated he will attempt to arrive to [facility] @ 12:30. \par

## 2022-04-14 NOTE — PROCEDURE
[Outpatient] : Outpatient [Ambulatory] : Patient is ambulatory. [Cane] : cane [THIS CHAMBER HAS BEEN CLEANED / DISINFECTED] : This chamber has been cleaned / disinfected according to local and hospital policy and procedure prior to this treatment. [____] : Post-Dive: Time - [unfilled] [___] : Post-Dive: Value - [unfilled] mg/dL [Patient demonstrated and verbalized proper technique for using air break mask] : Patient demonstrated and verbalized proper technique for using air break mask [Patient educated on the risks of SMOKING prior to HBOT with understanding] : Patient educated on the risks of SMOKING prior to HBOT with understanding [Patient educated on the risks of CONSUMING ALCOHOL prior to HBOT with understanding] : Patient educated on the risks of CONSUMING ALCOHOL prior to HBOT with understanding [100% Cotton] : 100% cotton [Empty all pockets] : empty all pockets [No hair oils, wigs, hairpieces, pins] : no hair oils, wigs, hairpieces, pins  [Pre tx medications] : pre tx medications  [No make-up, creams] : no make-up, creams  [No jewelry] : no jewelry  [No matches, cigarettes, lighters] : no matches, cigarettes, lighters  [Hearing aid removed] : hearing aid removed [Dentures removed] : dentures removed [Ground bracelet on pt's wrist] : ground bracelet on pt's wrist  [Contacts removed] : contacts removed  [Remove nail polish] : remove nail polish  [No reading material] : no reading material  [Bra, undergarments removed] : bra, undergarments removed  [No contraindicated dressings] : no contraindicated dressings [Ground Wire - VISUAL Verification - Intact/Free of Obstruction] : Ground Wire - VISUAL Verification - Intact/Free of Obstruction  [Ground Continuity - Verified < 1 ohm w/ Wrist Strap [place]] : Ground Continuity - Verified < 1 ohm w/ Wrist Strap Eagle [Number: ___] : Number: [unfilled] [Diagnosis: ___] : Diagnosis: [unfilled] [Clear all fields] : clear all fields [] : No [FreeTextEntry4] : 100 [FreeTextEntry6] : 12 : 33 [FreeTextEntry8] : 12 : 43 [de-identified] : 3535 [de-identified] : 0029 [de-identified] : 7195 [de-identified] : 4351 [de-identified] : 6393 [de-identified] : 1603 [de-identified] : 120 MINUTES

## 2022-04-14 NOTE — PLAN
[FreeTextEntry1] : shweta, alginate, DD, QOD abdomen\par resume HBO\par f/u 2 wk\par \par time spent 25 mins.

## 2022-04-14 NOTE — ASSESSMENT
[Verbal] : Verbal [Written] : Written [Demo] : Demo [Patient] : Patient [Family member] : Family member [Good - alert, interested, motivated] : Good - alert, interested, motivated [Verbalizes knowledge/Understanding] : Verbalizes knowledge/understanding [Dressing changes] : dressing changes [Skin Care] : skin care [Signs and symptoms of infection] : sign and symptoms of infection [How and When to Call] : how and when to call [Pain Management] : pain management [Hyperbaric Therapy] : hyperbaric therapy [Patient responsibility to plan of care] : patient responsibility to plan of care [] : Yes [Stable] : stable [Home] : Home [Walker] : Walker [Not Applicable - Long Term Care/Home Health Agency] : Long Term Care/Home Health Agency: Not Applicable [FreeTextEntry2] : Infection prevention\par Localized wound care \par Goal remaining pain free regarding wounds\par Hyperbaric oxygen therapy  [FreeTextEntry4] : 10/30 HBOT completed \par Assessment in 2 weeks

## 2022-04-14 NOTE — HISTORY OF PRESENT ILLNESS
[FreeTextEntry1] : 82 yo WM, here for his weekly assessment. Presently receiving HBO tx for STRN. Tolerating it well. \par    Pt seen by Dr. Felipe, podiatrist regarding his bilat. feet yesterday. Some sutures removed yesterday.\par He extended po abx yesterday.\par \par 4/14/22 abdominal wound smaller with margin epithelializing

## 2022-04-14 NOTE — PROCEDURE
[Outpatient] : Outpatient [Ambulatory] : Patient is ambulatory. [Cane] : cane [THIS CHAMBER HAS BEEN CLEANED / DISINFECTED] : This chamber has been cleaned / disinfected according to local and hospital policy and procedure prior to this treatment. [____] : Post-Dive: Time - [unfilled] [___] : Post-Dive: Value - [unfilled] mg/dL [Patient demonstrated and verbalized proper technique for using air break mask] : Patient demonstrated and verbalized proper technique for using air break mask [Patient educated on the risks of SMOKING prior to HBOT with understanding] : Patient educated on the risks of SMOKING prior to HBOT with understanding [Patient educated on the risks of CONSUMING ALCOHOL prior to HBOT with understanding] : Patient educated on the risks of CONSUMING ALCOHOL prior to HBOT with understanding [100% Cotton] : 100% cotton [Empty all pockets] : empty all pockets [No hair oils, wigs, hairpieces, pins] : no hair oils, wigs, hairpieces, pins  [Pre tx medications] : pre tx medications  [No make-up, creams] : no make-up, creams  [No matches, cigarettes, lighters] : no matches, cigarettes, lighters  [No jewelry] : no jewelry  [Hearing aid removed] : hearing aid removed [Dentures removed] : dentures removed [Ground bracelet on pt's wrist] : ground bracelet on pt's wrist  [Contacts removed] : contacts removed  [Remove nail polish] : remove nail polish  [No reading material] : no reading material  [Bra, undergarments removed] : bra, undergarments removed  [No contraindicated dressings] : no contraindicated dressings [Ground Wire - VISUAL Verification - Intact/Free of Obstruction] : Ground Wire - VISUAL Verification - Intact/Free of Obstruction  [Ground Continuity - Verified < 1 ohm w/ Wrist Strap Eagle] : Ground Continuity - Verified < 1 ohm w/ Wrist Strap Eagle [Number: ___] : Number: [unfilled] [Diagnosis: ___] : Diagnosis: [unfilled] [Clear all fields] : clear all fields [] : No [FreeTextEntry4] : 100 [FreeTextEntry6] : 12 : 05 [FreeTextEntry8] : 12 : 15 [de-identified] : 0881 [de-identified] : 9182 [de-identified] : 4873 [de-identified] : 3421 [de-identified] : 6842 [de-identified] : 8186 [de-identified] : 120 MINUTES

## 2022-04-14 NOTE — ADDENDUM
[FreeTextEntry1] : The pt. presented to [facility] ambulating with cane and was A&Ox3 for scheduled HBOT.\par \par The pt's pre-dive screening found mild wheeze in the pt's R lower medial apiece during exhalation.\par Pt. denied any acute congestion or difficulty breathing. \par Pt. denied any chronic pulmonary hx.\par RN advised of same. \par RN repeated lung auscultation. \par Per RN, the pt. may begin HBOT.\par NO DRAINAGE NOTED ON DRESSING PRE HBOT \par The pt's pre-dive screening was found to be within acceptable limits to begin HBOT.\par The pt. descended @ 2.2 PSI/min. to Rx'd HBOT tx. depth of 2.4 UMER in chamber # 3 without incident.\par The pt. was observed with visible chest motion and without incident for the duration of HBOT.\par Transfer of Observation from [facility] to  upon the pt's arrival to HBOT tx. depth.\par PT TOLERATED AIR BREAKS WELL\par PT ASCENDED F ROM 2.4 UMER @ 2.2 PSI/MIN WITHOUT INCIDENT\par PT TOLERATD TX WELL\par PT DRESSING REINFORCED POST HBOT BY RN

## 2022-04-16 NOTE — ASSESSMENT
[No change from previous assessment] : No change from previous assessment [Time MD/Provider assessed Patient:_______] : Time MD/Provider assessed Patient: [unfilled] [Patient prepared for dive] : Patient prepared for dive [Patient undergoing HBO treatment for __________] : Patient undergoing HBO treatment for [unfilled] [Patient descended without problem for 9 minutes] : Patient descended without problem for 9 minutes [No dizziness or thirst] :  No dizziness or thirst [Vital signs stable] : Vital signs stable [No ear problems] : No ear problems [Tolerating dive well] : Tolerating dive well [No Chest Pain, shortness of breath] : No Chest Pain, shortness of breath [Respiratory Rate Stable] : Respiratory Rate Stable [No chest pain, shortness of breath, or ear pain] :  No chest pain, shortness of breath, or ear pain  [Tolerated Ascent well] : Tolerated Ascent well [Vital Signs stable] : Vital Signs stable [A physician was present throughout the entire HBOT] : A physician was present throughout the entire HBOT [No] : No [Clinically Stable] : Clinically stable [Continue Treatment Plan] : Continue treatment plan

## 2022-04-16 NOTE — ADDENDUM
[FreeTextEntry1] : PT ARRIVED AX0X3 TO [facility] ASSISTED WITH CANE\par ALL PT VITALS WITHIN PARAMETERS FOR HBOT\par PT STATED HIS DRESSING HAD FALLEN OFF HIS FEET. NURSE NOTIFIED\par NURSE CHANGED DRESSING ON PT PRIOR TO HBOT\par PT DESCENDED TO TX DEPTH OF 2.4ATA @2.2PSI/MIN IN CHAMBER 3\par PT SEEN RESTING AT DEPTH WITH VISIBLE CHEST RISE AND FALL OBSERVED CHAMBERSIDE\par PT SEEN TOLERATING AIRBREAKS WITHOUT INCIDENT\par PT ASCENDED FROM 2.4 UMER @ 2.2 PSI/MIN WITHOUT INCIDENT\par PT TOLERATED TX WELL\par \par COVID SWAB TO BE OBTAINED POST TX

## 2022-04-16 NOTE — PROCEDURE
[Outpatient] : Outpatient [Ambulatory] : Patient is ambulatory. [Cane] : cane [THIS CHAMBER HAS BEEN CLEANED / DISINFECTED] : This chamber has been cleaned / disinfected according to local and hospital policy and procedure prior to this treatment. [____] : Post-Dive: Time - [unfilled] [___] : Post-Dive: Value - [unfilled] mg/dL [Patient demonstrated and verbalized proper technique for using air break mask] : Patient demonstrated and verbalized proper technique for using air break mask [Patient educated on the risks of SMOKING prior to HBOT with understanding] : Patient educated on the risks of SMOKING prior to HBOT with understanding [Patient educated on the risks of CONSUMING ALCOHOL prior to HBOT with understanding] : Patient educated on the risks of CONSUMING ALCOHOL prior to HBOT with understanding [100% Cotton] : 100% cotton [Empty all pockets] : empty all pockets [No hair oils, wigs, hairpieces, pins] : no hair oils, wigs, hairpieces, pins  [Pre tx medications] : pre tx medications  [No make-up, creams] : no make-up, creams  [No jewelry] : no jewelry  [No matches, cigarettes, lighters] : no matches, cigarettes, lighters  [Hearing aid removed] : hearing aid removed [Dentures removed] : dentures removed [Ground bracelet on pt's wrist] : ground bracelet on pt's wrist  [Contacts removed] : contacts removed  [Remove nail polish] : remove nail polish  [No reading material] : no reading material  [Bra, undergarments removed] : bra, undergarments removed  [No contraindicated dressings] : no contraindicated dressings [Ground Wire - VISUAL Verification - Intact/Free of Obstruction] : Ground Wire - VISUAL Verification - Intact/Free of Obstruction  [Ground Continuity - Verified < 1 ohm w/ Wrist Strap [place]alge] : Ground Continuity - Verified < 1 ohm w/ Wrist Strap Eagle [Number: ___] : Number: [unfilled] [Diagnosis: ___] : Diagnosis: [unfilled] [Clear all fields] : clear all fields [] : No [FreeTextEntry6] : 7526 [FreeTextEntry4] : 100 [FreeTextEntry8] : 2509 [de-identified] : 3755 [de-identified] : 8623 [de-identified] : 5872 [de-identified] : 8208 [de-identified] : 2099 [de-identified] : 1504 [de-identified] : 120 MINUTES

## 2022-04-18 NOTE — PROCEDURE
[Outpatient] : Outpatient [Ambulatory] : Patient is ambulatory. [Cane] : cane [THIS CHAMBER HAS BEEN CLEANED / DISINFECTED] : This chamber has been cleaned / disinfected according to local and hospital policy and procedure prior to this treatment. [____] : Post-Dive: Time - [unfilled] [___] : Post-Dive: Value - [unfilled] mg/dL [Patient demonstrated and verbalized proper technique for using air break mask] : Patient demonstrated and verbalized proper technique for using air break mask [Patient educated on the risks of SMOKING prior to HBOT with understanding] : Patient educated on the risks of SMOKING prior to HBOT with understanding [Patient educated on the risks of CONSUMING ALCOHOL prior to HBOT with understanding] : Patient educated on the risks of CONSUMING ALCOHOL prior to HBOT with understanding [100% Cotton] : 100% cotton [Empty all pockets] : empty all pockets [No hair oils, wigs, hairpieces, pins] : no hair oils, wigs, hairpieces, pins  [Pre tx medications] : pre tx medications  [No make-up, creams] : no make-up, creams  [No jewelry] : no jewelry  [No matches, cigarettes, lighters] : no matches, cigarettes, lighters  [Hearing aid removed] : hearing aid removed [Dentures removed] : dentures removed [Ground bracelet on pt's wrist] : ground bracelet on pt's wrist  [Contacts removed] : contacts removed  [Remove nail polish] : remove nail polish  [No reading material] : no reading material  [Bra, undergarments removed] : bra, undergarments removed  [No contraindicated dressings] : no contraindicated dressings [Ground Wire - VISUAL Verification - Intact/Free of Obstruction] : Ground Wire - VISUAL Verification - Intact/Free of Obstruction  [Ground Continuity - Verified < 1 ohm w/ Wrist Strap Eagle] : Ground Continuity - Verified < 1 ohm w/ Wrist Strap Eagle [Number: ___] : Number: [unfilled] [Diagnosis: ___] : Diagnosis: [unfilled] [Clear all fields] : clear all fields [] : No [FreeTextEntry4] : 90 min [FreeTextEntry6] : 12 : 00 [FreeTextEntry8] : 12 : 10 [de-identified] : 12 : 40 [de-identified] : 12 : 45 [de-identified] : 13 : 15 [de-identified] : 13 : 20 [de-identified] : 3713 [de-identified] : 1400 [de-identified] : 120 min

## 2022-04-18 NOTE — ADDENDUM
[FreeTextEntry1] : The pt. presented to [facility] ambulating with cane and was A&Ox3 for scheduled HBOT.\par The pt's pre-dive screening was found to be within acceptable limits to begin HBOT.\par \par During pre-dive screening, the wound dressing on the pt's R foot was observed to have risen above the pt's digital wound on that foot. \par Pt. advised that same had occurred during placement of his knee pad prior to arrival at [facility]. \par RN was advised. \par The pt. is to receive routine wound dressing change post-HBOT.\par \par The pt. was provided mild off-loading measures placed at B/L knees and B/L ankles to comfort prior to start of HBOT.\par PVHO ventilation rate was reduced from 275 to 200 lpm at the start of HBOT at the pt's request. \par \par The pt's hearing aids were removed and secured in belonging bag prior to start of HBOT. \par \par The pt. descended @ 2.2 PSI/min. to Rx'd HBOT tx. depth of 2.4 UMER in chamber # 1 without incident.\par The pt. was observed with visible chest motion and without incident for the duration of HBOT.\par Pt ascended from tx depth to surface pressure without incident\par pt received wound care post tx

## 2022-04-19 NOTE — PROCEDURE
[Outpatient] : Outpatient [Ambulatory] : Patient is ambulatory. [Cane] : cane [THIS CHAMBER HAS BEEN CLEANED / DISINFECTED] : This chamber has been cleaned / disinfected according to local and hospital policy and procedure prior to this treatment. [Patient demonstrated and verbalized proper technique for using air break mask] : Patient demonstrated and verbalized proper technique for using air break mask [Patient educated on the risks of SMOKING prior to HBOT with understanding] : Patient educated on the risks of SMOKING prior to HBOT with understanding [Patient educated on the risks of CONSUMING ALCOHOL prior to HBOT with understanding] : Patient educated on the risks of CONSUMING ALCOHOL prior to HBOT with understanding [100% Cotton] : 100% cotton [Empty all pockets] : empty all pockets [No hair oils, wigs, hairpieces, pins] : no hair oils, wigs, hairpieces, pins  [Pre tx medications] : pre tx medications  [No make-up, creams] : no make-up, creams  [No jewelry] : no jewelry  [No matches, cigarettes, lighters] : no matches, cigarettes, lighters  [Hearing aid removed] : hearing aid removed [Dentures removed] : dentures removed [Ground bracelet on pt's wrist] : ground bracelet on pt's wrist  [Contacts removed] : contacts removed  [Remove nail polish] : remove nail polish  [No reading material] : no reading material  [Bra, undergarments removed] : bra, undergarments removed  [No contraindicated dressings] : no contraindicated dressings [Ground Wire - VISUAL Verification - Intact/Free of Obstruction] : Ground Wire - VISUAL Verification - Intact/Free of Obstruction  [Ground Continuity - Verified < 1 ohm w/ Wrist Strap Eagle] : Ground Continuity - Verified < 1 ohm w/ Wrist Strap Eagle [Number: ___] : Number: [unfilled] [Diagnosis: ___] : Diagnosis: [unfilled] [____] : Post-Dive: Time - [unfilled] [___] : Post-Dive: Value - [unfilled] mg/dL [Clear all fields] : clear all fields [] : No [FreeTextEntry4] : 100 [FreeTextEntry6] : 1572 [FreeTextEntry8] : 6823 [de-identified] : 2093 [de-identified] : 4339 [de-identified] : 4442 [de-identified] : 5938 [de-identified] : 5118 [de-identified] : 9532 [de-identified] : 120 MINUTES

## 2022-04-19 NOTE — ADDENDUM
[FreeTextEntry1] : PT ARRIVED A&OX3 WITH THE USE OF A CANE\par ALL VITALS WITHIN PARAMETERS FOR HBOT \par NO DRAINAGE NOTED ON BOTH DRESSINGS PRIOR TO DESCENT \par PT DESCENDED TO 2.4 UMER @ 2.2 PSI/MIN IN CHAMBER #2 WITHOUT INCIDENT\par PT RESTING AT TX DEPTH WITH VISIBLE CHEST RISE AND FALL OBSERVED CHAMBER SIDE\par PT TOLERATED AIR BREAKS WELL\par PT ASCENDED FROM 2.4 UMER @ 2.2 PSI/MIN WITHOUT INCIDENT\par PT TOLERATED TX WELL\par

## 2022-04-19 NOTE — PROCEDURE
[Outpatient] : Outpatient [Ambulatory] : Patient is ambulatory. [Cane] : cane [THIS CHAMBER HAS BEEN CLEANED / DISINFECTED] : This chamber has been cleaned / disinfected according to local and hospital policy and procedure prior to this treatment. [Patient demonstrated and verbalized proper technique for using air break mask] : Patient demonstrated and verbalized proper technique for using air break mask [Patient educated on the risks of SMOKING prior to HBOT with understanding] : Patient educated on the risks of SMOKING prior to HBOT with understanding [Patient educated on the risks of CONSUMING ALCOHOL prior to HBOT with understanding] : Patient educated on the risks of CONSUMING ALCOHOL prior to HBOT with understanding [100% Cotton] : 100% cotton [Empty all pockets] : empty all pockets [No hair oils, wigs, hairpieces, pins] : no hair oils, wigs, hairpieces, pins  [Pre tx medications] : pre tx medications  [No make-up, creams] : no make-up, creams  [No jewelry] : no jewelry  [No matches, cigarettes, lighters] : no matches, cigarettes, lighters  [Hearing aid removed] : hearing aid removed [Dentures removed] : dentures removed [Ground bracelet on pt's wrist] : ground bracelet on pt's wrist  [Contacts removed] : contacts removed  [Remove nail polish] : remove nail polish  [No reading material] : no reading material  [Bra, undergarments removed] : bra, undergarments removed  [No contraindicated dressings] : no contraindicated dressings [Ground Wire - VISUAL Verification - Intact/Free of Obstruction] : Ground Wire - VISUAL Verification - Intact/Free of Obstruction  [Ground Continuity - Verified < 1 ohm w/ Wrist Strap Eagle] : Ground Continuity - Verified < 1 ohm w/ Wrist Strap Eagle [Number: ___] : Number: [unfilled] [Diagnosis: ___] : Diagnosis: [unfilled] [____] : Post-Dive: Time - [unfilled] [___] : Post-Dive: Value - [unfilled] mg/dL [Clear all fields] : clear all fields [] : No [FreeTextEntry4] : 100 [FreeTextEntry6] : 7072 [FreeTextEntry8] : 1002 [de-identified] : 1030 [de-identified] : 1038 [de-identified] : 1101 [de-identified] : 5641 [de-identified] : 0510 [de-identified] : 7393 [de-identified] : 120 MINUTES

## 2022-04-19 NOTE — ASSESSMENT

## 2022-04-19 NOTE — ADDENDUM
[FreeTextEntry1] : PT ARRIVED A&OX3 WITH THE USE OF A CANE\par ALL VITALS WITHIN PARAMETERS FOR HBOT\par NO DRAINAGE NOTED ON DRESSING PRIOR TO DESCENT \par PT DESCENDED TO 2.4 UMER @ 2.2 PSI/MIN IN CHAMBER #4  WITHOUT INCIDENT\par PT RESTING AT TX DEPTH WITH VISIBLE CHEST RISE AND FALL OBSERVED CHAMBER SIDE\par PT TOLERATED AIR BREAKS WELL\par PT ASCENDED FROM 2.4 UMER @ 2.2 PSI/MIN WITHOUT INCIDENT\par PT TOLERATED TX WELL\par PT DRESSINGS CHANGED POST HBOT

## 2022-04-21 NOTE — ADDENDUM
[FreeTextEntry1] : PT ARRIVED A&OX3 WITH THE USE OF A CANE\par ALL VITALS WITHIN PARAMETERS FOR HBOT\par SMALL AREA OF DRAINAGE NOTED ON THE PATIENTS ANTERIOR ASPECT OF THE FOOT\par LPN ADVISED, DRESSING WILL BE CHANGED POST HBOT                                                                              \par OFF LOADING MEASURES PROVIDED TO PATIENTS FEET AND BACK OF KNEES PRIOR TO HBOT START \par PT DESCENDED TO 2.4 UMER @ 2.2 PSI/MIN IN CHAMBER #4  WITHOUT INCIDENT\par PT RESTING AT TX DEPTH WITH VISIBLE CHEST RISE AND FALL OBSERVED CHAMBER SIDE\par \par \par Pt tolerated both air breaks well.\par Pt ascended from 2.4 UMER @ 2.2 PSI/MIN without incident in chamber #4.\par Pt tolerated treatment well.\par \par

## 2022-04-21 NOTE — PROCEDURE
[Outpatient] : Outpatient [Ambulatory] : Patient is ambulatory. [THIS CHAMBER HAS BEEN CLEANED / DISINFECTED] : This chamber has been cleaned / disinfected according to local and hospital policy and procedure prior to this treatment. [Patient demonstrated and verbalized proper technique for using air break mask] : Patient demonstrated and verbalized proper technique for using air break mask [Patient educated on the risks of SMOKING prior to HBOT with understanding] : Patient educated on the risks of SMOKING prior to HBOT with understanding [Patient educated on the risks of CONSUMING ALCOHOL prior to HBOT with understanding] : Patient educated on the risks of CONSUMING ALCOHOL prior to HBOT with understanding [100% Cotton] : 100% cotton [Empty all pockets] : empty all pockets [No hair oils, wigs, hairpieces, pins] : no hair oils, wigs, hairpieces, pins  [Pre tx medications] : pre tx medications  [No make-up, creams] : no make-up, creams  [No jewelry] : no jewelry  [No matches, cigarettes, lighters] : no matches, cigarettes, lighters  [Hearing aid removed] : hearing aid removed [Dentures removed] : dentures removed [Ground bracelet on pt's wrist] : ground bracelet on pt's wrist  [Contacts removed] : contacts removed  [Remove nail polish] : remove nail polish  [No reading material] : no reading material  [Bra, undergarments removed] : bra, undergarments removed  [No contraindicated dressings] : no contraindicated dressings [Ground Wire - VISUAL Verification - Intact/Free of Obstruction] : Ground Wire - VISUAL Verification - Intact/Free of Obstruction  [Ground Continuity - Verified < 1 ohm w/ Wrist Strap Eagle] : Ground Continuity - Verified < 1 ohm w/ Wrist Strap Eagle [Number: ___] : Number: [unfilled] [Diagnosis: ___] : Diagnosis: [unfilled] [____] : Post-Dive: Time - [unfilled] [___] : Post-Dive: Value - [unfilled] mg/dL [Clear all fields] : clear all fields [] : No [FreeTextEntry4] : 100 [FreeTextEnsep6] : 9274 [FreeTextEntry8] : 7882 [de-identified] : 0189 [de-identified] : 7616 [de-identified] : 4016 [de-identified] : 9635 [de-identified] : 0148 [de-identified] : 4108 [de-identified] : 120 MINUTES

## 2022-04-21 NOTE — ADDENDUM
[FreeTextEntry1] : The pt. presented to [facility] ambulating with cane and was A&Ox3 for scheduled HBOT.\par The pt's pre-dive screening was found to be within acceptable limits to begin HBOT.\par pt received wound care pre tx due to contraindicated dressing \par pt descended to tx depth pf 2.4ata @2.2psi/min in chamber 3\par PT RESTING AT TX DEPTH WITH VISIBLE CHEST RISE AND FALL OBSERVED CHAMBER SIDE\par PT TOLERATED AIR BREAKS WELL\par PT ASCENDED FROM 2.4 UMER @ 2.2 PSI/MIN WITHOUT INCIDENT\par PT TOLERATED TX WELL\par

## 2022-04-21 NOTE — PROCEDURE
[Outpatient] : Outpatient [Ambulatory] : Patient is ambulatory. [Cane] : cane [THIS CHAMBER HAS BEEN CLEANED / DISINFECTED] : This chamber has been cleaned / disinfected according to local and hospital policy and procedure prior to this treatment. [____] : Pre-Dive: Time - [unfilled] [___] : Pre-Dive: Value - [unfilled] mg/dL [Patient demonstrated and verbalized proper technique for using air break mask] : Patient demonstrated and verbalized proper technique for using air break mask [Patient educated on the risks of SMOKING prior to HBOT with understanding] : Patient educated on the risks of SMOKING prior to HBOT with understanding [Patient educated on the risks of CONSUMING ALCOHOL prior to HBOT with understanding] : Patient educated on the risks of CONSUMING ALCOHOL prior to HBOT with understanding [100% Cotton] : 100% cotton [Empty all pockets] : empty all pockets [No hair oils, wigs, hairpieces, pins] : no hair oils, wigs, hairpieces, pins  [Pre tx medications] : pre tx medications  [No make-up, creams] : no make-up, creams  [No jewelry] : no jewelry  [No matches, cigarettes, lighters] : no matches, cigarettes, lighters  [Hearing aid removed] : hearing aid removed [Dentures removed] : dentures removed [Ground bracelet on pt's wrist] : ground bracelet on pt's wrist  [Contacts removed] : contacts removed  [Remove nail polish] : remove nail polish  [No reading material] : no reading material  [Bra, undergarments removed] : bra, undergarments removed  [No contraindicated dressings] : no contraindicated dressings [Ground Wire - VISUAL Verification - Intact/Free of Obstruction] : Ground Wire - VISUAL Verification - Intact/Free of Obstruction  [Ground Continuity - Verified < 1 ohm w/ Wrist Strap Eagle] : Ground Continuity - Verified < 1 ohm w/ Wrist Strap Eagle [Number: ___] : Number: [unfilled] [Diagnosis: ___] : Diagnosis: [unfilled] [Clear all fields] : clear all fields [] : No [FreeTextEntry6] : 7184 [FreeTextEntry4] : 100 [FreeTextEntry8] : 8028 [de-identified] : 4044 [de-identified] : 4286 [de-identified] : 3214 [de-identified] : 7387 [de-identified] : 8227 [de-identified] : 1658 [de-identified] : 120mins

## 2022-04-22 NOTE — PROCEDURE
[Outpatient] : Outpatient [Ambulatory] : Patient is ambulatory. [Cane] : cane [THIS CHAMBER HAS BEEN CLEANED / DISINFECTED] : This chamber has been cleaned / disinfected according to local and hospital policy and procedure prior to this treatment. [Patient demonstrated and verbalized proper technique for using air break mask] : Patient demonstrated and verbalized proper technique for using air break mask [Patient educated on the risks of SMOKING prior to HBOT with understanding] : Patient educated on the risks of SMOKING prior to HBOT with understanding [Patient educated on the risks of CONSUMING ALCOHOL prior to HBOT with understanding] : Patient educated on the risks of CONSUMING ALCOHOL prior to HBOT with understanding [100% Cotton] : 100% cotton [Empty all pockets] : empty all pockets [No hair oils, wigs, hairpieces, pins] : no hair oils, wigs, hairpieces, pins  [Pre tx medications] : pre tx medications  [No make-up, creams] : no make-up, creams  [No jewelry] : no jewelry  [No matches, cigarettes, lighters] : no matches, cigarettes, lighters  [Hearing aid removed] : hearing aid removed [Dentures removed] : dentures removed [Ground bracelet on pt's wrist] : ground bracelet on pt's wrist  [Contacts removed] : contacts removed  [Remove nail polish] : remove nail polish  [No reading material] : no reading material  [Bra, undergarments removed] : bra, undergarments removed  [No contraindicated dressings] : no contraindicated dressings [Ground Wire - VISUAL Verification - Intact/Free of Obstruction] : Ground Wire - VISUAL Verification - Intact/Free of Obstruction  [Ground Continuity - Verified < 1 ohm w/ Wrist Strap Eagle] : Ground Continuity - Verified < 1 ohm w/ Wrist Strap Eagle [Number: ___] : Number: [unfilled] [Diagnosis: ___] : Diagnosis: [unfilled] [____] : Post-Dive: Time - [unfilled] [___] : Post-Dive: Value - [unfilled] mg/dL [Clear all fields] : clear all fields [] : No [FreeTextEntry4] : 100 [FreeTextEntry6] : 5965 [FreeTextEntry8] : 6382 [de-identified] : 2739 [de-identified] : 7735 [de-identified] : 1509 [de-identified] : 8409 [de-identified] : 8875 [de-identified] : 3756 [de-identified] : 120 MINUTES

## 2022-04-22 NOTE — ADDENDUM
[FreeTextEntry1] : PT ARRIVED A&OX3 WITH THE USE OF A CANE\par ALL VITALS WITHIN PARAMETERS FOR HBOT\par PT DESCENDED TO 2.4 UMER @ 2.2 PSI/MIN IN CHAMBER #2  WITHOUT INCIDENT\par PT RESTING AT TX DEPTH WITH VISIBLE CHEST RISE AND FALL OBSERVED CHAMBER SIDE\par PT TOLERATED BOTH AIR BREAKS WELL \par PT ASCENDED FROM TX DEPTH WITHOUT INCIDENT IN CHAMBER #2\par PT RECEIVED WOUND CARE BY RN POST HBOT \par PT RECEIVED COVID SWAB POST HBOT BY RN \par PT TOLERATED TX WELL \par \par \par \par PT TO RECEIVE DRESSING CHANGE AND ROUTINE COVID SWAB POST HBOT \par

## 2022-04-28 NOTE — ADDENDUM
[FreeTextEntry1] : The pt. presented to [facility] ambulating with cane and A&Ox3 for scheduled HBOT.\par The pt's pre-dive screening was found to be within acceptable limits to begin HBOT.\par \par The pt's lower extremity wound dressing was observed to be clean, dry, and intact prior to the start of HBOT.\par The pt's abdominal dressing was observed to have a small amount of serous drainage. \par The pt. received wound care yesterday (TUES., 4/26/22) due to conflicting appt. on Mon., 04/25/22.\par Per Nurse, the pt. will receive dressing changes to abdomen and foot post-HBOT.\par \par The pt. was offered and accepted off-loading wedge and expressed comfort after placement of same prior to the start of HBOT.\par At the pt's request, PVHO ventilation rate was reduced from 275 to 200 lpm for comfort at the start of HBOT BT. \par The pt. descended @ 2.2 PSI/min. to Rx'd HBOT tx. depth of 2.4 UMER in chamber # 3 without incident.\par The pt. was observed with visible chest motion and without incident for the duration of observed HBOT. \par The pt. was administered intermittent med. air over course of HBOT without incident. \par TRANSFER OF OBSERVATION FROM [facility] TO \par PT ASCENDED FROM 2.4 UMER @ 2.2 PSI/MIN WITHOUT INCIDENT\par PT TOLERATED TX WELL\par PT RECEIVED WOUND CARE POST HBOT

## 2022-04-28 NOTE — PROCEDURE
[Outpatient] : Outpatient [Ambulatory] : Patient is ambulatory. [Cane] : cane [THIS CHAMBER HAS BEEN CLEANED / DISINFECTED] : This chamber has been cleaned / disinfected according to local and hospital policy and procedure prior to this treatment. [____] : Post-Dive: Time - [unfilled] [___] : Post-Dive: Value - [unfilled] mg/dL [Patient demonstrated and verbalized proper technique for using air break mask] : Patient demonstrated and verbalized proper technique for using air break mask [Patient educated on the risks of SMOKING prior to HBOT with understanding] : Patient educated on the risks of SMOKING prior to HBOT with understanding [Patient educated on the risks of CONSUMING ALCOHOL prior to HBOT with understanding] : Patient educated on the risks of CONSUMING ALCOHOL prior to HBOT with understanding [Empty all pockets] : empty all pockets [100% Cotton] : 100% cotton [No hair oils, wigs, hairpieces, pins] : no hair oils, wigs, hairpieces, pins  [Pre tx medications] : pre tx medications  [No make-up, creams] : no make-up, creams  [No jewelry] : no jewelry  [No matches, cigarettes, lighters] : no matches, cigarettes, lighters  [Hearing aid removed] : hearing aid removed [Dentures removed] : dentures removed [Ground bracelet on pt's wrist] : ground bracelet on pt's wrist  [Contacts removed] : contacts removed  [Remove nail polish] : remove nail polish  [No reading material] : no reading material  [Bra, undergarments removed] : bra, undergarments removed  [No contraindicated dressings] : no contraindicated dressings [Ground Wire - VISUAL Verification - Intact/Free of Obstruction] : Ground Wire - VISUAL Verification - Intact/Free of Obstruction  [Ground Continuity - Verified < 1 ohm w/ Wrist Strap Eagle] : Ground Continuity - Verified < 1 ohm w/ Wrist Strap Eagle [Number: ___] : Number: [unfilled] [Diagnosis: ___] : Diagnosis: [unfilled] [Clear all fields] : clear all fields [] : No [FreeTextEntry4] : 100 [FreeTextEntry6] : 12 : 05 [FreeTextEntry8] : 12 : 15 [de-identified] : 12 : 45 [de-identified] : 12 : 50 [de-identified] : 2099 [de-identified] : 3176 [de-identified] : 2968 [de-identified] : 3957 [de-identified] : 120 MINUTES

## 2022-04-29 NOTE — HISTORY OF PRESENT ILLNESS
[FreeTextEntry1] : 80 yo WM, here for his weekly assessment. 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 Pt with a h/o pancreatic CA. The wound continues to have mod drainage. No change in size since last assessment. Discussed possible option of skin grafting via plastic surgery.

## 2022-04-29 NOTE — PLAN
[FreeTextEntry1] : shweta, alginate, DD, abd pad\par ag alginate, Dd to right 2nd toe\par continue HBO tx\par f/u 1 wk\par \par time spent 25 mins.

## 2022-04-29 NOTE — ASSESSMENT
[] : Yes [Verbal] : Verbal [Written] : Written [Demo] : Demo [Patient] : Patient [Good - alert, interested, motivated] : Good - alert, interested, motivated [Demonstrates independently] : demonstrates independently [Dressing changes] : dressing changes [Foot Care] : foot care [Skin Care] : skin care [Pressure relief] : pressure relief [Signs and symptoms of infection] : sign and symptoms of infection [Nutrition] : nutrition [How and When to Call] : how and when to call [Hyperbaric Therapy] : hyperbaric therapy [Off-loading] : off-loading [Patient responsibility to plan of care] : patient responsibility to plan of care [Stable] : stable [Home] : Home [Cane] : Cane [Not Applicable - Long Term Care/Home Health Agency] : Long Term Care/Home Health Agency: Not Applicable [FreeTextEntry2] : Infection prevention \par Wound care (dressing changes)\par Maintain optimal skin integrity to high pressure areas\par Nutrition and wound healing\par Offloading the stress on skin structures and decreasing potential pathologic biomechanical influences.\par HBOT [FreeTextEntry4] : Pt completed 19/30 HBOT. Auth submitted for +10 = 40 total\par Pt to f/u tomorrow, 4/29/22 for HBOT. Assessment in 2 weeks.

## 2022-04-29 NOTE — PHYSICAL EXAM
[Normal Thyroid] : the thyroid was normal [Normal Breath Sounds] : Normal breath sounds [Normal Heart Sounds] : normal heart sounds [Normal Rate and Rhythm] : normal rate and rhythm [Alert] : alert [Oriented to Person] : oriented to person [Oriented to Place] : oriented to place [Oriented to Time] : oriented to time [Calm] : calm [Abdominal Pad] : Abdominal Pad [4 x 4] : 4 x 4  [JVD] : no jugular venous distention  [Abdomen Masses] : No abdominal massess [Abdomen Tenderness] : ~T ~M No abdominal tenderness [Enlarged] : not enlarged [de-identified] : adult WM, NAD, alert, Ox3. [FreeTextEntry2] : 13.4 [FreeTextEntry1] : Abdomen [FreeTextEntry3] : 7.5 [FreeTextEntry4] : 0.1 [de-identified] : serosanguineous [de-identified] : 100% [de-identified] : none [de-identified] : Marlee, Calcium Alginate  [de-identified] : Mechanically cleansed with sterile gauze and normal saline 0.9%\par Dry Dressing\par  [FreeTextEntry7] : Right foot 2nd digit  [FreeTextEntry8] : 1.0 [FreeTextEntry9] : 1.0 [de-identified] : 0.2 [de-identified] : serosanguineous [de-identified] : 100% [de-identified] : Alginate Ag [de-identified] : Left foot 2nd digit - RESOLVED [de-identified] : Mechanically cleansed with sterile gauze and normal saline 0.9%\par Dry Dressing\par  [TWNoteComboBox4] : Large [TWNoteComboBox5] : No [TWNoteComboBox6] : Other [de-identified] : No [de-identified] : Macerated [de-identified] : None [de-identified] : 3x Weekly [de-identified] : Primary Dressing [de-identified] : Small [de-identified] : No [de-identified] : Other [de-identified] : No [de-identified] : Normal [de-identified] : None [de-identified] : 3x Weekly [de-identified] : Primary Dressing

## 2022-05-01 PROBLEM — Z95.810 CARDIAC DEFIBRILLATOR IN PLACE: Status: ACTIVE | Noted: 2021-08-04

## 2022-05-01 PROBLEM — L97.511 CHRONIC ULCER OF RIGHT FOOT, LIMITED TO BREAKDOWN OF SKIN: Status: ACTIVE | Noted: 2022-01-01

## 2022-05-02 PROBLEM — G47.00 INSOMNIA: Status: ACTIVE | Noted: 2022-01-01

## 2022-05-02 NOTE — PROCEDURE
[Outpatient] : Outpatient [Ambulatory] : Patient is ambulatory. [Cane] : cane [THIS CHAMBER HAS BEEN CLEANED / DISINFECTED] : This chamber has been cleaned / disinfected according to local and hospital policy and procedure prior to this treatment. [Patient demonstrated and verbalized proper technique for using air break mask] : Patient demonstrated and verbalized proper technique for using air break mask [Patient educated on the risks of SMOKING prior to HBOT with understanding] : Patient educated on the risks of SMOKING prior to HBOT with understanding [Patient educated on the risks of CONSUMING ALCOHOL prior to HBOT with understanding] : Patient educated on the risks of CONSUMING ALCOHOL prior to HBOT with understanding [100% Cotton] : 100% cotton [Empty all pockets] : empty all pockets [No hair oils, wigs, hairpieces, pins] : no hair oils, wigs, hairpieces, pins  [Pre tx medications] : pre tx medications  [No make-up, creams] : no make-up, creams  [No jewelry] : no jewelry  [No matches, cigarettes, lighters] : no matches, cigarettes, lighters  [Hearing aid removed] : hearing aid removed [Dentures removed] : dentures removed [Ground bracelet on pt's wrist] : ground bracelet on pt's wrist  [Contacts removed] : contacts removed  [Remove nail polish] : remove nail polish  [No reading material] : no reading material  [Bra, undergarments removed] : bra, undergarments removed  [No contraindicated dressings] : no contraindicated dressings [Ground Wire - VISUAL Verification - Intact/Free of Obstruction] : Ground Wire - VISUAL Verification - Intact/Free of Obstruction  [Ground Continuity - Verified < 1 ohm w/ Wrist Strap Eagle] : Ground Continuity - Verified < 1 ohm w/ Wrist Strap Eagle [Number: ___] : Number: [unfilled] [Diagnosis: ___] : Diagnosis: [unfilled] [____] : Post-Dive: Time - [unfilled] [___] : Post-Dive: Value - [unfilled] mg/dL [Clear all fields] : clear all fields [] : No [FreeTextEntry4] : 100 [FreeTextEntry6] : 12 : 11 [FreeTextEntry8] : 12 : 21 [de-identified] : 12 : 51 [de-identified] : 12 : 56 [de-identified] : 13 : 26 [de-identified] : 5404 [de-identified] : 1002 [de-identified] : 8199 [de-identified] : 120

## 2022-05-02 NOTE — ADDENDUM
[FreeTextEntry1] : PT ARRIVED A&OX3 WITH THE USE OF A CANE\par ALL VITALS WITHIN PARAMETERS FOR HBOT\par PT DESCENDED TO 2.4 UMER @ 2.2 PSI/MIN IN CHAMBER #3 WITHOUT INCIDENT\par PT RESTING AT TX DEPTH WITH VISIBLE CHEST RISE AND FALL OBSERVED CHAMBER SIDE\par PT TOLERATED AIR BREAK WELL.\par Transfer of Observation from [facility] to [facility] @ 13 : 10.\par The pt. was observed with visible chest motion and without incident for the duration of observed HBOT.\par The pt. was administered intermittent med. air over course of HBOT without incident.\par Transfer of Observation from [facility] to [facility] @ 14:00.\par pt ascended in chamber 3 with out incident\par pt tolerated treatment well \par

## 2022-05-03 NOTE — ADDENDUM
[FreeTextEntry1] : The pt. presented to [facility] ambulating with cane and was A&Ox3 for scheduled HBOT.\par The pt's pre-dive screening was found to be within acceptable limits to begin HBOT. Pt was provided with a leg wedge for offloading and comfort throughout tx.\par pt descended to tx depth pf 2.4ata @2.2psi/min in chamber 1\par PT RESTING AT TX DEPTH WITH VISIBLE CHEST RISE AND FALL OBSERVED CHAMBER SIDE\par Pt tolerated air breaks well.\par Pt ascent was without incident.\par Pt tolerated hbot well.\par Wound care provided post tx.

## 2022-05-03 NOTE — PROCEDURE
[Outpatient] : Outpatient [Ambulatory] : Patient is ambulatory. [Cane] : cane [THIS CHAMBER HAS BEEN CLEANED / DISINFECTED] : This chamber has been cleaned / disinfected according to local and hospital policy and procedure prior to this treatment. [___] : Pre-Dive: Value - [unfilled] mg/dL [Patient demonstrated and verbalized proper technique for using air break mask] : Patient demonstrated and verbalized proper technique for using air break mask [Patient educated on the risks of SMOKING prior to HBOT with understanding] : Patient educated on the risks of SMOKING prior to HBOT with understanding [Patient educated on the risks of CONSUMING ALCOHOL prior to HBOT with understanding] : Patient educated on the risks of CONSUMING ALCOHOL prior to HBOT with understanding [100% Cotton] : 100% cotton [Empty all pockets] : empty all pockets [No hair oils, wigs, hairpieces, pins] : no hair oils, wigs, hairpieces, pins  [Pre tx medications] : pre tx medications  [No make-up, creams] : no make-up, creams  [No jewelry] : no jewelry  [No matches, cigarettes, lighters] : no matches, cigarettes, lighters  [Hearing aid removed] : hearing aid removed [Dentures removed] : dentures removed [Ground bracelet on pt's wrist] : ground bracelet on pt's wrist  [Contacts removed] : contacts removed  [Remove nail polish] : remove nail polish  [No reading material] : no reading material  [Bra, undergarments removed] : bra, undergarments removed  [No contraindicated dressings] : no contraindicated dressings [Ground Wire - VISUAL Verification - Intact/Free of Obstruction] : Ground Wire - VISUAL Verification - Intact/Free of Obstruction  [Ground Continuity - Verified < 1 ohm w/ Wrist Strap Eagle] : Ground Continuity - Verified < 1 ohm w/ Wrist Strap Eagle [Number: ___] : Number: [unfilled] [Diagnosis: ___] : Diagnosis: [unfilled] [____] : Post-Dive: Time - [unfilled] [Clear all fields] : clear all fields [] : No [FreeTextEntry4] : 100 [FreeTextEntry6] : 9824 [FreeTextEntry8] : 8019 [de-identified] : 4464 [de-identified] : 8240 [de-identified] : 1265 [de-identified] : 5035 [de-identified] : 5787 [de-identified] : 5375 [de-identified] : 120 MIN

## 2022-05-04 PROBLEM — S41.011A: Status: ACTIVE | Noted: 2022-01-01

## 2022-05-04 NOTE — ADDENDUM
[FreeTextEntry1] : The pt. presented to [facility] ambulating with cane and was A&Ox3 for scheduled HBOT.\par The pt's pre-dive screening was found to be within acceptable limits to begin HBOT. Pt was provided with a leg wedge for offloading and comfort throughout tx.\par pt descended to tx depth pf 2.4ata @2.2psi/min in chamber 2\par PT RESTING AT TX DEPTH WITH VISIBLE CHEST RISE AND FALL OBSERVED CHAMBER SIDE\par Pt tolerated air breaks well.\par Pt ascent was without incident.\par Pt tolerated hbot well.\par Wound care provided post tx.

## 2022-05-04 NOTE — PROCEDURE
[Outpatient] : Outpatient [Ambulatory] : Patient is ambulatory. [Cane] : cane [THIS CHAMBER HAS BEEN CLEANED / DISINFECTED] : This chamber has been cleaned / disinfected according to local and hospital policy and procedure prior to this treatment. [Patient demonstrated and verbalized proper technique for using air break mask] : Patient demonstrated and verbalized proper technique for using air break mask [Patient educated on the risks of SMOKING prior to HBOT with understanding] : Patient educated on the risks of SMOKING prior to HBOT with understanding [Patient educated on the risks of CONSUMING ALCOHOL prior to HBOT with understanding] : Patient educated on the risks of CONSUMING ALCOHOL prior to HBOT with understanding [100% Cotton] : 100% cotton [Empty all pockets] : empty all pockets [No hair oils, wigs, hairpieces, pins] : no hair oils, wigs, hairpieces, pins  [Pre tx medications] : pre tx medications  [No make-up, creams] : no make-up, creams  [No jewelry] : no jewelry  [No matches, cigarettes, lighters] : no matches, cigarettes, lighters  [Hearing aid removed] : hearing aid removed [Dentures removed] : dentures removed [Ground bracelet on pt's wrist] : ground bracelet on pt's wrist  [Contacts removed] : contacts removed  [Remove nail polish] : remove nail polish  [No reading material] : no reading material  [Bra, undergarments removed] : bra, undergarments removed  [No contraindicated dressings] : no contraindicated dressings [Ground Wire - VISUAL Verification - Intact/Free of Obstruction] : Ground Wire - VISUAL Verification - Intact/Free of Obstruction  [Ground Continuity - Verified < 1 ohm w/ Wrist Strap Eagle] : Ground Continuity - Verified < 1 ohm w/ Wrist Strap Eagle [Number: ___] : Number: [unfilled] [Diagnosis: ___] : Diagnosis: [unfilled] [____] : Post-Dive: Time - [unfilled] [___] : Post-Dive: Value - [unfilled] mg/dL [Clear all fields] : clear all fields [] : No [FreeTextEntry4] : 100 [FreeTextEntry6] : 12 : 15 [FreeTextEntry8] : 12 : 25 [de-identified] : 12 : 55 [de-identified] : 13 : 00 [de-identified] : 13 : 30 [de-identified] : 13 : 35 [de-identified] : 9723 [de-identified] : 8483 [de-identified] : 120 MINUTES

## 2022-05-04 NOTE — PROCEDURE
[Outpatient] : Outpatient [Ambulatory] : Patient is ambulatory. [Cane] : cane [THIS CHAMBER HAS BEEN CLEANED / DISINFECTED] : This chamber has been cleaned / disinfected according to local and hospital policy and procedure prior to this treatment. [____] : Post-Dive: Time - [unfilled] [___] : Post-Dive: Value - [unfilled] mg/dL [100% Cotton] : 100% cotton [Empty all pockets] : empty all pockets [No hair oils, wigs, hairpieces, pins] : no hair oils, wigs, hairpieces, pins  [Pre tx medications] : pre tx medications  [No make-up, creams] : no make-up, creams  [No jewelry] : no jewelry  [No matches, cigarettes, lighters] : no matches, cigarettes, lighters  [Hearing aid removed] : hearing aid removed [Dentures removed] : dentures removed [Ground bracelet on pt's wrist] : ground bracelet on pt's wrist  [Contacts removed] : contacts removed  [Remove nail polish] : remove nail polish  [No reading material] : no reading material  [Bra, undergarments removed] : bra, undergarments removed  [No contraindicated dressings] : no contraindicated dressings [Ground Wire - VISUAL Verification - Intact/Free of Obstruction] : Ground Wire - VISUAL Verification - Intact/Free of Obstruction  [Ground Continuity - Verified < 1 ohm w/ Wrist Strap Eagle] : Ground Continuity - Verified < 1 ohm w/ Wrist Strap Eagle [Clear all fields] : clear all fields [Number: ___] : Number: [unfilled] [Diagnosis: ___] : Diagnosis: [unfilled] [] : No [FreeTextEntry4] : 100 Min [FreeTextEntry6] : 8931 [[facility]Entry8] : 6633 [de-identified] : 8539 [de-identified] : 4459 [de-identified] : 5575 [de-identified] : 2055 [de-identified] : 8413 [de-identified] : 7837 [de-identified] : 120 Min

## 2022-05-04 NOTE — ADDENDUM
[FreeTextEntry1] : Pt descended to 2.4 UMER @ 2.2 PSI/MIN without incident in chamber #1.\par Pt resting comfortably @ depth, equal chest rise observed throughout tx.\par Transfer of Observation from  to T @ 12:30.\par The pt. was observed with visible chest motion and without incident for the duration of observed HBOT.\par The pt. was administered intermittent med. air over course of HBOT without incident.\par PVHO ventilation rate was reduced from 275 to 175 lpm at the start of HBOT for comfort at the pt's request. \par Transfer of Observation from [facility] to  @ 13:45. \par TRANSFER OF OBSERVATION FROM [facility] TO [facility]\par PT ASCENDED FROM 2.4 UMER @ 2.2 PSI/MIN WITHOUT INCIDENT\par PT TOLERATED TX WELL\par \par Dressing to be changed post tx. \par Pts legs were offloaded via wedge for whole duration of tx.

## 2022-05-06 NOTE — PROCEDURE
[Outpatient] : Outpatient [Ambulatory] : Patient is ambulatory. [Cane] : cane [THIS CHAMBER HAS BEEN CLEANED / DISINFECTED] : This chamber has been cleaned / disinfected according to local and hospital policy and procedure prior to this treatment. [Patient demonstrated and verbalized proper technique for using air break mask] : Patient demonstrated and verbalized proper technique for using air break mask [Patient educated on the risks of SMOKING prior to HBOT with understanding] : Patient educated on the risks of SMOKING prior to HBOT with understanding [Patient educated on the risks of CONSUMING ALCOHOL prior to HBOT with understanding] : Patient educated on the risks of CONSUMING ALCOHOL prior to HBOT with understanding [100% Cotton] : 100% cotton [Empty all pockets] : empty all pockets [No hair oils, wigs, hairpieces, pins] : no hair oils, wigs, hairpieces, pins  [Pre tx medications] : pre tx medications  [No make-up, creams] : no make-up, creams  [No jewelry] : no jewelry  [No matches, cigarettes, lighters] : no matches, cigarettes, lighters  [Hearing aid removed] : hearing aid removed [Dentures removed] : dentures removed [Ground bracelet on pt's wrist] : ground bracelet on pt's wrist  [Contacts removed] : contacts removed  [Remove nail polish] : remove nail polish  [No reading material] : no reading material  [Bra, undergarments removed] : bra, undergarments removed  [No contraindicated dressings] : no contraindicated dressings [Ground Wire - VISUAL Verification - Intact/Free of Obstruction] : Ground Wire - VISUAL Verification - Intact/Free of Obstruction  [Ground Continuity - Verified < 1 ohm w/ Wrist Strap Eagle] : Ground Continuity - Verified < 1 ohm w/ Wrist Strap Eagle [Number: ___] : Number: [unfilled] [Diagnosis: ___] : Diagnosis: [unfilled] [____] : Post-Dive: Time - [unfilled] [___] : Post-Dive: Value - [unfilled] mg/dL [Clear all fields] : clear all fields [] : No [FreeTextEntry4] : 100 [FreeTextEntry6] : 1123 [FreeTextEntry8] : 8553 [de-identified] : 9674 [de-identified] : 6235 [de-identified] : 9297 [de-identified] : 7567 [de-identified] : 5690 [de-identified] : 4425 [de-identified] : 120 MINUTES

## 2022-05-06 NOTE — ADDENDUM
[FreeTextEntry1] : pt vitals within normal parameters to begin hbot \par pt provided offloading wedge for pt comfort \par pt descended to tx depth 2.4 keturah @2.2 psi/min without incident in chamber #2 \par pt's resting at tx depth with visible chest rise and fall as observed chamber side \par pt tolerated both intermediate air breaks well\par pt ascended to tx depth without incident in chamber #2 \par pt tolerated tx well\par pt received wc for hip post hbot without incident

## 2022-05-06 NOTE — ADDENDUM
[FreeTextEntry1] : PT ARRIVED A&OX3 WITH THE USE OF A CANE\par ALL VITALS WITHIN PARAMETERS FOR HBOT\par NO DRAINGE NOTED ON DRESSING PRE HBOT \par PT DESCENDED TO 2.4 UMER @ 2.2 PSI/MIN IN CHAMBER #2  WITHOUT INCIDENT\par PT RESTING AT TX DEPTH WITH VISIBLE CHEST RISE AND FALL OBSERVED CHAMBER SIDE\par PT TOLERATED AIR BREAKS WELL\par PT ASCENDED FROM 2.4 UMER @ 2.2 PSI/MIN WITHOUT INCIDENT\par PT TOLERATED TX WELL\par PT RECEIVED DRESSING CHANGE POST HBOT

## 2022-05-06 NOTE — PROCEDURE
[Outpatient] : Outpatient [Cane] : cane [THIS CHAMBER HAS BEEN CLEANED / DISINFECTED] : This chamber has been cleaned / disinfected according to local and hospital policy and procedure prior to this treatment. [Patient demonstrated and verbalized proper technique for using air break mask] : Patient demonstrated and verbalized proper technique for using air break mask [Patient educated on the risks of SMOKING prior to HBOT with understanding] : Patient educated on the risks of SMOKING prior to HBOT with understanding [Patient educated on the risks of CONSUMING ALCOHOL prior to HBOT with understanding] : Patient educated on the risks of CONSUMING ALCOHOL prior to HBOT with understanding [100% Cotton] : 100% cotton [Empty all pockets] : empty all pockets [No hair oils, wigs, hairpieces, pins] : no hair oils, wigs, hairpieces, pins  [Pre tx medications] : pre tx medications  [No make-up, creams] : no make-up, creams  [No jewelry] : no jewelry  [No matches, cigarettes, lighters] : no matches, cigarettes, lighters  [Hearing aid removed] : hearing aid removed [Dentures removed] : dentures removed [Ground bracelet on pt's wrist] : ground bracelet on pt's wrist  [Contacts removed] : contacts removed  [Remove nail polish] : remove nail polish  [No reading material] : no reading material  [Bra, undergarments removed] : bra, undergarments removed  [No contraindicated dressings] : no contraindicated dressings [Ground Wire - VISUAL Verification - Intact/Free of Obstruction] : Ground Wire - VISUAL Verification - Intact/Free of Obstruction  [Ground Continuity - Verified < 1 ohm w/ Wrist Strap Eagle] : Ground Continuity - Verified < 1 ohm w/ Wrist Strap Eagle [Number: ___] : Number: [unfilled] [Diagnosis: ___] : Diagnosis: [unfilled] [Clear all fields] : clear all fields [____] : Post-Dive: Time - [unfilled] [___] : Post-Dive: Value - [unfilled] mg/dL [] : No [FreeTextEntry4] : 100 minutes [FreeTextEntry6] : 1200 [FreeTextEntry8] : 1216 [de-identified] : 6391 [de-identified] : 4999 [de-identified] : 8184 [de-identified] : 5123 [de-identified] : 1079 [de-identified] : 1400 [de-identified] : 120 minutes

## 2022-05-06 NOTE — ASSESSMENT
[No change from previous assessment] : No change from previous assessment [Time MD/Provider assessed Patient:_______] : Time MD/Provider assessed Patient: [unfilled] [Patient prepared for dive] : Patient prepared for dive [Patient undergoing HBO treatment for __________] : Patient undergoing HBO treatment for [unfilled] [Patient descended without problem for 9 minutes] : Patient descended without problem for 9 minutes [No dizziness or thirst] :  No dizziness or thirst [No ear problems] : No ear problems [Vital signs stable] : Vital signs stable [Tolerating dive well] : Tolerating dive well [No Chest Pain, shortness of breath] : No Chest Pain, shortness of breath [Respiratory Rate Stable] : Respiratory Rate Stable [No chest pain, shortness of breath, or ear pain] :  No chest pain, shortness of breath, or ear pain  [Tolerated Ascent well] : Tolerated Ascent well [Vital Signs stable] : Vital Signs stable [A physician was present throughout the entire HBOT] : A physician was present throughout the entire HBOT [No] : No [Clinically Stable] : Clinically stable [Continue Treatment Plan] : Continue treatment plan [FreeTextEntry2] : After tx pt with a urinal while in the chamber and the tech/RN noticed the urine appeared pink tinged. I ordered a urinalysis to be done.\par    In addition, pt went to the changing area and tripped, bumped his head and had a superficial laceration to his ant. right shoulder when he fell against the wall.. Pt denies any HA, blurry vision, dizziness before/after, or LOC.  No contusion seen on his side of his right head from the fall. Linear superficial laceration seen on his right ant. shoulder-no bleeding. P: S/Sx discussed with pt of what to watch out for today/tomorrow. Xeroform to right shoulder.

## 2022-05-11 NOTE — PROCEDURE
[Outpatient] : Outpatient [Ambulatory] : Patient is ambulatory. [Cane] : cane [THIS CHAMBER HAS BEEN CLEANED / DISINFECTED] : This chamber has been cleaned / disinfected according to local and hospital policy and procedure prior to this treatment. [____] : Pre-Dive: Time - [unfilled] [___] : Pre-Dive: Value - [unfilled] mg/dL [Patient demonstrated and verbalized proper technique for using air break mask] : Patient demonstrated and verbalized proper technique for using air break mask [Patient educated on the risks of SMOKING prior to HBOT with understanding] : Patient educated on the risks of SMOKING prior to HBOT with understanding [Patient educated on the risks of CONSUMING ALCOHOL prior to HBOT with understanding] : Patient educated on the risks of CONSUMING ALCOHOL prior to HBOT with understanding [100% Cotton] : 100% cotton [Empty all pockets] : empty all pockets [No hair oils, wigs, hairpieces, pins] : no hair oils, wigs, hairpieces, pins  [Pre tx medications] : pre tx medications  [No make-up, creams] : no make-up, creams  [No jewelry] : no jewelry  [No matches, cigarettes, lighters] : no matches, cigarettes, lighters  [Hearing aid removed] : hearing aid removed [Dentures removed] : dentures removed [Ground bracelet on pt's wrist] : ground bracelet on pt's wrist  [Contacts removed] : contacts removed  [Remove nail polish] : remove nail polish  [No reading material] : no reading material  [Bra, undergarments removed] : bra, undergarments removed  [No contraindicated dressings] : no contraindicated dressings [Ground Wire - VISUAL Verification - Intact/Free of Obstruction] : Ground Wire - VISUAL Verification - Intact/Free of Obstruction  [Ground Continuity - Verified < 1 ohm w/ Wrist Strap Eagle] : Ground Continuity - Verified < 1 ohm w/ Wrist Strap Eagle [Number: ___] : Number: [unfilled] [Diagnosis: ___] : Diagnosis: [unfilled] [Clear all fields] : clear all fields [] : No [FreeTextEntry4] : 100 minutes [FreeTextEntry6] : 9333 [FreeTextEntry8] : 9894 [de-identified] : 4058 [de-identified] : 5580 [de-identified] : 7261 [de-identified] : 5460 [de-identified] : 1626 [de-identified] : 3367 [de-identified] : 120 minutes

## 2022-05-11 NOTE — PHYSICAL EXAM
[Normal Thyroid] : the thyroid was normal [Normal Breath Sounds] : Normal breath sounds [Normal Heart Sounds] : normal heart sounds [Normal Rate and Rhythm] : normal rate and rhythm [Alert] : alert [Oriented to Person] : oriented to person [Oriented to Place] : oriented to place [Oriented to Time] : oriented to time [Calm] : calm [Abdominal Pad] : Abdominal Pad [2 x 2] : 2 x 2  [2+] : left 2+ [Skin Ulcer] : ulcer [4 x 4] : 4 x 4  [JVD] : no jugular venous distention  [Abdomen Masses] : No abdominal massess [Abdomen Tenderness] : ~T ~M No abdominal tenderness [Enlarged] : not enlarged [de-identified] : intact  [de-identified] : See Dr. López Note* [de-identified] : 6.0 [de-identified] : 0.1 [de-identified] : serous and serosanguineous [de-identified] : bruising  [de-identified] : Xeroform  [de-identified] : Mechanically Cleansed with Normal Saline \par Tegaderm [FreeTextEntry2] : 3.0 [FreeTextEntry3] : 0.2 [FreeTextEntry4] : 0.1 [de-identified] : serous  [de-identified] : Dry Dressing  [de-identified] : Mechanically Cleansed with Normal Saline  [de-identified] : No [de-identified] : None [de-identified] : 100% [de-identified] : No [de-identified] : 3x Weekly [de-identified] : Primary Dressing [de-identified] : Small [de-identified] : No [de-identified] : Other [de-identified] : No [de-identified] : other [de-identified] : None [de-identified] : None [de-identified] : 100% [de-identified] : No [de-identified] : 3x Weekly [de-identified] : Primary Dressing [TWNoteComboBox4] : Small [TWNoteComboBox5] : No [TWNoteComboBox6] : Traumatic [de-identified] : No [de-identified] : Normal [de-identified] : None [de-identified] : None [de-identified] : 100% [de-identified] : 3x Weekly [de-identified] : Primary Dressing [Ankle Swelling (On Exam)] : not present [Varicose Veins Of Lower Extremities] : not present [] : not present [de-identified] : A&Ox3, NAD [de-identified] : HTN, HLD , pacemaker  [de-identified] : 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 [de-identified] : right hallux and 1st metatarsal head amputation site healed, right 2nd dorsal PIPJ ulcer down to skin, no erythema, no purulence, no malodor, no proximal streaking [de-identified] : diminished light touch sensation bilaterally [FreeTextEntry7] : Right Foot 2nd Digit  [FreeTextEntry8] : 1.0 [FreeTextEntry9] : 0.6 [de-identified] : 0.1 [de-identified] : serosanguineous [de-identified] : intact  [de-identified] : Silver Alginate  [de-identified] : Mechanically Cleansed with Normal Saline  [de-identified] : See  Note* [de-identified] : Right Anterior Shoulder  [de-identified] : See Dr. White Note* [FreeTextEntry1] : Left Elbow  [de-identified] : Small [de-identified] : No [de-identified] : Other [de-identified] : No [de-identified] : other [de-identified] : None [de-identified] : None [de-identified] : 100% [de-identified] : No [de-identified] : 3x Weekly [de-identified] : Primary Dressing

## 2022-05-11 NOTE — HISTORY OF PRESENT ILLNESS
[FreeTextEntry1] : Pt seen for follow up of right 2nd dorsal PIPJ ulcer down to skin, subcutaneous tissue, and fat. Pt currently in HBOT at this time.

## 2022-05-11 NOTE — VITALS
[Sharp] : sharp [] : No [de-identified] : Pt states 7/10 pain in right shoulder.  [FreeTextEntry3] : Right Shoulder [FreeTextEntry1] : Rest [FreeTextEntry2] : Movement  [FreeTextEntry4] : Pt states relief from OTC Tylenol and Orthopedic consult recommended.

## 2022-05-11 NOTE — ASSESSMENT
[Verbal] : Verbal [Written] : Written [Demo] : Demo [Patient] : Patient [Good - alert, interested, motivated] : Good - alert, interested, motivated [Verbalizes knowledge/Understanding] : Verbalizes knowledge/understanding [Dressing changes] : dressing changes [Foot Care] : foot care [Skin Care] : skin care [Pressure relief] : pressure relief [Signs and symptoms of infection] : sign and symptoms of infection [How and When to Call] : how and when to call [Pain Management] : pain management [Hyperbaric Therapy] : hyperbaric therapy [Off-loading] : off-loading [Patient responsibility to plan of care] : patient responsibility to plan of care [Stable] : stable [Home] : Home [Cane] : Cane [] : No [FreeTextEntry3] : New wounds post fall on (5/4/22). [FreeTextEntry2] : Promote Skin Integrity\par S/S of Infection \par HBOT \par Offloading and Pressure Relief \par Skin Care \par Foot Care\par Dressing Changes \par F/U daily for HBOT [FreeTextEntry4] : DPM ordered NuShield to be applied to right foot wound during next assessment- Authorization submitted today. \par 24/40 HBOT completed. Pt encouraged to continue HBOT as prescribed, no subsequent treatments ordered at this time. \par Pt encouraged to continue pressure relief and offloading wound site. \par MD recommended follow up post fall and c/o of shoulder pain. Pt given information for Dr. Haim Dorantes @ [facility] [phone].\par F/U daily for HBOT and F/U assessment  2 weeks. \par

## 2022-05-11 NOTE — REVIEW OF SYSTEMS
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

## 2022-05-11 NOTE — PLAN
[FreeTextEntry1] : Patient examined and evaluated at this time.\par Continue local wound care and offloading.\par Continue HBOT at this time.\par Pt remains a high risk for further amputation, limb loss, sepsis, and death.\par Spent 20 minutes for patient care and medical decision making.\par Patient to follow up in 2 weeks.\par

## 2022-05-11 NOTE — PHYSICAL EXAM
[Normal Thyroid] : the thyroid was normal [Normal Breath Sounds] : Normal breath sounds [Normal Heart Sounds] : normal heart sounds [Normal Rate and Rhythm] : normal rate and rhythm [Alert] : alert [Oriented to Person] : oriented to person [Oriented to Place] : oriented to place [Oriented to Time] : oriented to time [Calm] : calm [Abdominal Pad] : Abdominal Pad [2 x 2] : 2 x 2  [2+] : left 2+ [Skin Ulcer] : ulcer [4 x 4] : 4 x 4  [JVD] : no jugular venous distention  [Abdomen Masses] : No abdominal massess [Abdomen Tenderness] : ~T ~M No abdominal tenderness [Enlarged] : not enlarged [de-identified] : intact  [de-identified] : See Dr. López Note* [de-identified] : 6.0 [de-identified] : 0.1 [de-identified] : serous and serosanguineous [de-identified] : bruising  [de-identified] : Xeroform  [de-identified] : Mechanically Cleansed with Normal Saline \par Tegaderm [FreeTextEntry2] : 3.0 [FreeTextEntry3] : 0.2 [FreeTextEntry4] : 0.1 [de-identified] : serous  [de-identified] : Dry Dressing  [de-identified] : Mechanically Cleansed with Normal Saline  [de-identified] : No [de-identified] : None [de-identified] : 100% [de-identified] : No [de-identified] : 3x Weekly [de-identified] : Primary Dressing [de-identified] : Small [de-identified] : No [de-identified] : Other [de-identified] : No [de-identified] : other [de-identified] : None [de-identified] : None [de-identified] : 100% [de-identified] : No [de-identified] : 3x Weekly [de-identified] : Primary Dressing [TWNoteComboBox4] : Small [TWNoteComboBox5] : No [TWNoteComboBox6] : Traumatic [de-identified] : No [de-identified] : Normal [de-identified] : None [de-identified] : None [de-identified] : 100% [de-identified] : 3x Weekly [de-identified] : Primary Dressing [Ankle Swelling (On Exam)] : not present [Varicose Veins Of Lower Extremities] : not present [] : not present [de-identified] : A&Ox3, NAD [de-identified] : HTN, HLD , pacemaker  [de-identified] : 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 [de-identified] : right hallux and 1st metatarsal head amputation site healed, right 2nd dorsal PIPJ ulcer down to skin, no erythema, no purulence, no malodor, no proximal streaking [de-identified] : diminished light touch sensation bilaterally [FreeTextEntry7] : Right Foot 2nd Digit  [FreeTextEntry8] : 1.0 [FreeTextEntry9] : 0.6 [de-identified] : 0.1 [de-identified] : serosanguineous [de-identified] : intact  [de-identified] : Silver Alginate  [de-identified] : Mechanically Cleansed with Normal Saline  [de-identified] : See  Note* [de-identified] : Right Anterior Shoulder  [de-identified] : See Dr. White Note* [FreeTextEntry1] : Left Elbow  [de-identified] : Small [de-identified] : No [de-identified] : Other [de-identified] : No [de-identified] : other [de-identified] : None [de-identified] : None [de-identified] : 100% [de-identified] : No [de-identified] : 3x Weekly [de-identified] : Primary Dressing

## 2022-05-11 NOTE — PROCEDURE
[Outpatient] : Outpatient [Ambulatory] : Patient is ambulatory. [Cane] : cane [THIS CHAMBER HAS BEEN CLEANED / DISINFECTED] : This chamber has been cleaned / disinfected according to local and hospital policy and procedure prior to this treatment. [Patient demonstrated and verbalized proper technique for using air break mask] : Patient demonstrated and verbalized proper technique for using air break mask [Patient educated on the risks of SMOKING prior to HBOT with understanding] : Patient educated on the risks of SMOKING prior to HBOT with understanding [Patient educated on the risks of CONSUMING ALCOHOL prior to HBOT with understanding] : Patient educated on the risks of CONSUMING ALCOHOL prior to HBOT with understanding [100% Cotton] : 100% cotton [Empty all pockets] : empty all pockets [No hair oils, wigs, hairpieces, pins] : no hair oils, wigs, hairpieces, pins  [Pre tx medications] : pre tx medications  [No make-up, creams] : no make-up, creams  [No jewelry] : no jewelry  [No matches, cigarettes, lighters] : no matches, cigarettes, lighters  [Hearing aid removed] : hearing aid removed [Dentures removed] : dentures removed [Ground bracelet on pt's wrist] : ground bracelet on pt's wrist  [Contacts removed] : contacts removed  [Remove nail polish] : remove nail polish  [No reading material] : no reading material  [Bra, undergarments removed] : bra, undergarments removed  [No contraindicated dressings] : no contraindicated dressings [Ground Wire - VISUAL Verification - Intact/Free of Obstruction] : Ground Wire - VISUAL Verification - Intact/Free of Obstruction  [Ground Continuity - Verified < 1 ohm w/ Wrist Strap Eagle] : Ground Continuity - Verified < 1 ohm w/ Wrist Strap Eagle [Number: ___] : Number: [unfilled] [Diagnosis: ___] : Diagnosis: [unfilled] [____] : Post-Dive: Time - [unfilled] [___] : Post-Dive: Value - [unfilled] mg/dL [Clear all fields] : clear all fields [] : No [FreeTextEntry4] : 100 [FreeTextEntry6] : 1043 [FreeTextEntry8] : 1200 [de-identified] : 0170 [de-identified] : 9075 [de-identified] : 3684 [de-identified] : 2491 [de-identified] : 4383 [de-identified] : 5503 [de-identified] : 120 MINUTES

## 2022-05-11 NOTE — ADDENDUM
[FreeTextEntry1] : The pt. presented to [facility] ambulating with cane and was A&Ox3 for scheduled HBOT.\par The pt's pre-dive screening was found to be within acceptable limits to begin HBOT. Pt was provided with a leg wedge for offloading and comfort throughout tx.\par pt descended to tx depth of 2.4ata @2.2psi/min in chamber 4\par pt resting at depth with visible chest rise and fall observed chamber side\par pt tolerated both intermediate air breaks well\par pt ascended from tx depth without incident in chamber #4 \par pt tolerated tx well\par pt received wc post hbot without incident \par

## 2022-05-11 NOTE — ADDENDUM
[FreeTextEntry1] : Pts legs offloaded via leg wedge. \par Pt descended to 2.4 UMER @ 2.2 PSI/MIN without incident in chamber #2.\par Pt resting comfortably @ depth, equal chest rise observed throughout tx.\par PT TOLERATED AIR BREAKS WELL\par PT ASCENDED FROM 2.4 UMER @ 2.2 PSI/MIN WITHOUT INCIDENT\par PT TOLERATED TX WELL\par PT RECEIVED ROUTINE COVID SWAB POST HBOT

## 2022-05-11 NOTE — REVIEW OF SYSTEMS
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

## 2022-05-11 NOTE — VITALS
[Sharp] : sharp [] : No [de-identified] : Pt states 7/10 pain in right shoulder.  [FreeTextEntry3] : Right Shoulder [FreeTextEntry1] : Rest [FreeTextEntry2] : Movement  [FreeTextEntry4] : Pt states relief from OTC Tylenol and Orthopedic consult recommended.

## 2022-05-11 NOTE — ASSESSMENT
[Verbal] : Verbal [Written] : Written [Demo] : Demo [Patient] : Patient [Good - alert, interested, motivated] : Good - alert, interested, motivated [Verbalizes knowledge/Understanding] : Verbalizes knowledge/understanding [Dressing changes] : dressing changes [Foot Care] : foot care [Skin Care] : skin care [Pressure relief] : pressure relief [Signs and symptoms of infection] : sign and symptoms of infection [How and When to Call] : how and when to call [Pain Management] : pain management [Hyperbaric Therapy] : hyperbaric therapy [Off-loading] : off-loading [Patient responsibility to plan of care] : patient responsibility to plan of care [Stable] : stable [Home] : Home [Cane] : Cane [] : No [FreeTextEntry3] : New wounds post fall on (5/4/22). [FreeTextEntry2] : Promote Skin Integrity\par S/S of Infection \par HBOT \par Offloading and Pressure Relief \par Skin Care \par Foot Care\par Dressing Changes \par F/U daily for HBOT [FreeTextEntry4] : DPM ordered NuShield to be applied to right foot wound during next assessment- Authorization submitted today. \par 24/40 HBOT completed. Pt encouraged to continue HBOT as prescribed, no subsequent treatments ordered at this time. \par Pt encouraged to continue pressure relief and offloading wound site. \par MD recommended follow up post fall and c/o of shoulder pain. Pt given information for Dr. Haim Dorantes @ [facility] [phone].\par F/U daily for HBOT and F/U assessment  2 weeks. \par

## 2022-05-12 PROBLEM — S43.491A SPRAIN OF OTHER PART OF RIGHT SHOULDER REGION, INITIAL ENCOUNTER: Status: ACTIVE | Noted: 2022-01-01

## 2022-05-12 PROBLEM — M19.011 ARTHRITIS OF RIGHT ACROMIOCLAVICULAR JOINT: Status: ACTIVE | Noted: 2022-01-01

## 2022-05-12 PROBLEM — M77.8 TENDINITIS OF RIGHT SHOULDER: Status: ACTIVE | Noted: 2022-01-01

## 2022-05-12 NOTE — HISTORY OF PRESENT ILLNESS
[Sudden] : sudden [10] : 10 [5] : 5 [Burning] : burning [Sharp] : sharp [Stabbing] : stabbing [Meds] : meds [de-identified] : 81 year old RHD male with pain in the right shoulder. 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 He has pain and trouble reaching over head, behind back and sleeping at night. NO prior history of injury to the shoulder. He has taken Tylenol at night prior to bedtime with mild help. No radicular complaints.  [] : no [FreeTextEntry1] : right shoulder and neck [FreeTextEntry3] : 05/04/2022 [FreeTextEntry5] : pt came out of hyperbaric chamber and tripped over his gown and hit the wall. pt immediately felt pain in his right shoulder when this occurred.  [FreeTextEntry7] : up to his neck  [FreeTextEntry9] : tylenol  [de-identified] : movement

## 2022-05-12 NOTE — PROCEDURE
[Outpatient] : Outpatient [Ambulatory] : Patient is ambulatory. [Cane] : cane [THIS CHAMBER HAS BEEN CLEANED / DISINFECTED] : This chamber has been cleaned / disinfected according to local and hospital policy and procedure prior to this treatment. [____] : Pre-Dive: Time - [unfilled] [___] : Pre-Dive: Value - [unfilled] mg/dL [Patient demonstrated and verbalized proper technique for using air break mask] : Patient demonstrated and verbalized proper technique for using air break mask [Patient educated on the risks of SMOKING prior to HBOT with understanding] : Patient educated on the risks of SMOKING prior to HBOT with understanding [Patient educated on the risks of CONSUMING ALCOHOL prior to HBOT with understanding] : Patient educated on the risks of CONSUMING ALCOHOL prior to HBOT with understanding [100% Cotton] : 100% cotton [Empty all pockets] : empty all pockets [No hair oils, wigs, hairpieces, pins] : no hair oils, wigs, hairpieces, pins  [Pre tx medications] : pre tx medications  [No make-up, creams] : no make-up, creams  [No jewelry] : no jewelry  [No matches, cigarettes, lighters] : no matches, cigarettes, lighters  [Hearing aid removed] : hearing aid removed [Dentures removed] : dentures removed [Ground bracelet on pt's wrist] : ground bracelet on pt's wrist  [Contacts removed] : contacts removed  [Remove nail polish] : remove nail polish  [No reading material] : no reading material  [Bra, undergarments removed] : bra, undergarments removed  [No contraindicated dressings] : no contraindicated dressings [Ground Wire - VISUAL Verification - Intact/Free of Obstruction] : Ground Wire - VISUAL Verification - Intact/Free of Obstruction  [Ground Continuity - Verified < 1 ohm w/ Wrist Strap Eagle] : Ground Continuity - Verified < 1 ohm w/ Wrist Strap Eagle [Number: ___] : Number: [unfilled] [Diagnosis: ___] : Diagnosis: [unfilled] [Clear all fields] : clear all fields [] : No [FreeTextEntry4] : 100 minutes [FreeTextEnkaq6] : 0516 [FreeTextEntry8] : 2022 [de-identified] : 2647 [de-identified] : 1361 [de-identified] : 1978 [de-identified] : 9845 [de-identified] : 8217 [de-identified] : 9910 [de-identified] : 120 minutes

## 2022-05-12 NOTE — DISCUSSION/SUMMARY
[Medication Risks Reviewed] : Medication risks reviewed [de-identified] : Case discussed, treatment options reviewed.\par Poss PT, if no resolution then return on 2 weeks for poss MRI\par Try ice or heat

## 2022-05-12 NOTE — ADDENDUM
[FreeTextEntry1] : Pt descended to 2.4 UMER @ 2.2 PSI/MIN without incident in chamber #2.\par Pt resting comfortably @ depth, equal chest rise observed throughout tx.\par pt tolerated both intermediate air breaks well\par pt ascended from tx depth without incident in chamber #2 \par pt tolerated tx well\par Dressing changed prior to tx.\par Pts legs offloaded via leg wedge.

## 2022-05-12 NOTE — PHYSICAL EXAM
[Sitting] : sitting [Mild] : mild [5 ___] : forward flexion 5[unfilled]/5 [4___] : external rotation 4[unfilled]/5 [5___] : internal rotation 5[unfilled]/5 [Right] : right shoulder [AC Joint Arthrosis] : AC Joint Arthrosis [] : no erythema [There are no fractures, subluxations or dislocations. No significant abnormalities are seen] : There are no fractures, subluxations or dislocations. No significant abnormalities are seen [Degenerative change] : Degenerative change [TWNoteComboBox7] : active forward flexion 110 degrees [TWNoteComboBox4] : passive forward flexion 160 degrees [TWNoteComboBox6] : internal rotation L5 [de-identified] : external rotation 45 degrees

## 2022-05-12 NOTE — PROCEDURE
[FreeTextEntry3] : Large Joint Injection / Aspiration: Celestone, Lidocaine, Marcaine and Guidance Ultrasound\par Large Joint Injection was performed because of pain and inflammation. Anesthesia: ethyl chloride sprayed topically.. \par Celestone: An injection of Celestone 12 mg , 2 cc. Needle size: 22 gauge , 1.5 inch. \par Lidocaine: 3 cc. \par Marcaine: 3 cc. \par \par Medication was injected in the right shoulder. Patient has tried OTC's including aspirin, Ibuprofen, Aleve etc or prescription NSAIDS, and/or exercises at home and/ or physical therapy without satisfactory response. After verbal consent using sterile preparation and technique. The risks, benefits, and alternatives to cortisone injection were explained in full to the patient. Risks outlined include but are not limited to infection, sepsis, bleeding, scarring, skin discoloration, temporary increase in pain, syncopal episode, failure to resolve symptoms, allergic reaction, symptom recurrence, and elevation of blood sugar in diabetics. Patient understood the risks. All questions were answered. After discussion of options, patient requested an injection. Oral informed consent was obtained and sterile prep was done of the injection site. Sterile technique was utilized for the procedure including the preparation of the solutions used for the injection. Patient tolerated the procedure well. Advised to ice the injection site this evening. Prep with betadine locally to site. Sterile technique used. Patient tolerated procedure well. Post Procedure Instructions: Patient was advised to call if redness, pain, or fever occur and apply ice for 15 min. out of every hour for the next 12-24 hours as tolerated. patient was advised to rest the joint(s) for 1 days. \par \par Ultrasound Guidance was used for the following reasons: for Glenohumeral injection. \par \par Ultrasound guided injection was performed of the shoulder, visualization of the needle and placement of injection was performed without complication.\par

## 2022-05-13 NOTE — ADDENDUM
[FreeTextEntry1] : The pt. presented to [facility] ambulating with cane and was A&Ox3 for scheduled HBOT.\par The pt's pre-dive screening was found to be within acceptable limits to begin HBOT. Pt was provided with a leg wedge for offloading and comfort throughout tx.\par pt was seen with a lidocane patch applied. Nurse notified and removed patch prior to hbot\par pt descended to tx depth pf 2.4ata @2.2psi/min in chamber 4\par pt resting at depth with visible chest rise and fall observed chamberside\par pt observation transferred from  to [facility] @ 1448\par pt observation transferred from [facility] to  @ 1526\par pt seen tolerating airbreaks without incident\par pt ascended from surface pressure without incident \par pt received wound care post tx

## 2022-05-13 NOTE — PROCEDURE
[Outpatient] : Outpatient [Ambulatory] : Patient is ambulatory. [Cane] : cane [THIS CHAMBER HAS BEEN CLEANED / DISINFECTED] : This chamber has been cleaned / disinfected according to local and hospital policy and procedure prior to this treatment. [Patient demonstrated and verbalized proper technique for using air break mask] : Patient demonstrated and verbalized proper technique for using air break mask [Patient educated on the risks of SMOKING prior to HBOT with understanding] : Patient educated on the risks of SMOKING prior to HBOT with understanding [Patient educated on the risks of CONSUMING ALCOHOL prior to HBOT with understanding] : Patient educated on the risks of CONSUMING ALCOHOL prior to HBOT with understanding [100% Cotton] : 100% cotton [Empty all pockets] : empty all pockets [No hair oils, wigs, hairpieces, pins] : no hair oils, wigs, hairpieces, pins  [Pre tx medications] : pre tx medications  [No make-up, creams] : no make-up, creams  [No jewelry] : no jewelry  [No matches, cigarettes, lighters] : no matches, cigarettes, lighters  [Hearing aid removed] : hearing aid removed [Dentures removed] : dentures removed [Ground bracelet on pt's wrist] : ground bracelet on pt's wrist  [Contacts removed] : contacts removed  [Remove nail polish] : remove nail polish  [No reading material] : no reading material  [Bra, undergarments removed] : bra, undergarments removed  [No contraindicated dressings] : no contraindicated dressings [Ground Wire - VISUAL Verification - Intact/Free of Obstruction] : Ground Wire - VISUAL Verification - Intact/Free of Obstruction  [Ground Continuity - Verified < 1 ohm w/ Wrist Strap Eagle] : Ground Continuity - Verified < 1 ohm w/ Wrist Strap Eagle [Number: ___] : Number: [unfilled] [Diagnosis: ___] : Diagnosis: [unfilled] [____] : Post-Dive: Time - [unfilled] [___] : Post-Dive: Value - [unfilled] mg/dL [Clear all fields] : clear all fields [] : No [FreeTextEntry4] : 100 Min [FreeTextEntry6] : 4245 [FreeTextEntry8] : 8490 [de-identified] : 4637 [de-identified] : 3949 [de-identified] : 3697 [de-identified] : 3793 [de-identified] : 1523 [de-identified] : 0566 [de-identified] : 120 Min

## 2022-05-16 PROBLEM — S40.211D: Status: ACTIVE | Noted: 2022-01-01

## 2022-05-16 NOTE — VITALS
[Pain related to present condition?] : The patient's  pain is not related to present condition. [] : No [de-identified] : 0/10

## 2022-05-16 NOTE — HISTORY OF PRESENT ILLNESS
[FreeTextEntry1] : 81 year old man in [facility] today for HBOT, right toe wound and STRN anterior abdominal wall.  Patient s/p fall to right shoulder approximately 1 week ago with pain, decreased range of motion and abrasion.  Patient was given name and number for an orthopedic surgeon at a prior visit but did not follow up.  Local wound care given at [facility].  Today patient continues to have pain right shoulder

## 2022-05-16 NOTE — PLAN
[FreeTextEntry1] : Patietn seen and examined\par Right shoulder abrasion cleansed and covered with xeroform gauze and dry dressing\par I personally spoke to him and recommended that he see an orthopedic doctor.  He was again given number to office near his home. \par  Local wound care to abrasion, cleansed and covered with xeroform gauze and  dry dressing.  will be seen daily in [facility] for HBOT.  will follow up as to visit to ortho.

## 2022-05-16 NOTE — ASSESSMENT
[Stable] : stable [Home] : Home [Cane] : Cane [Not Applicable - Long Term Care/Home Health Agency] : Long Term Care/Home Health Agency: Not Applicable [] : No [FreeTextEntry2] : Infection Prevention\par Restore Skin Integrity\par HBOT\par Orthopedic consult\par F/U daily for HBOT [FreeTextEntry4] : Patient's right shoulder re-assessed due to complaints of pain.\par MD recommended patient follow up with an orthopedic physician for further assessment as previously recommended. The patient verbalized understanding. Contact information for an [facility] physician in the patient's area of residence provided.\par F/U daily for HBOT.

## 2022-05-17 NOTE — PROCEDURE
[Outpatient] : Outpatient [Ambulatory] : Patient is ambulatory. [Walker] : walker [THIS CHAMBER HAS BEEN CLEANED / DISINFECTED] : This chamber has been cleaned / disinfected according to local and hospital policy and procedure prior to this treatment. [Patient demonstrated and verbalized proper technique for using air break mask] : Patient demonstrated and verbalized proper technique for using air break mask [Patient educated on the risks of SMOKING prior to HBOT with understanding] : Patient educated on the risks of SMOKING prior to HBOT with understanding [Patient educated on the risks of CONSUMING ALCOHOL prior to HBOT with understanding] : Patient educated on the risks of CONSUMING ALCOHOL prior to HBOT with understanding [100% Cotton] : 100% cotton [Empty all pockets] : empty all pockets [No hair oils, wigs, hairpieces, pins] : no hair oils, wigs, hairpieces, pins  [Pre tx medications] : pre tx medications  [No make-up, creams] : no make-up, creams  [No jewelry] : no jewelry  [No matches, cigarettes, lighters] : no matches, cigarettes, lighters  [Hearing aid removed] : hearing aid removed [Dentures removed] : dentures removed [Ground bracelet on pt's wrist] : ground bracelet on pt's wrist  [Contacts removed] : contacts removed  [Remove nail polish] : remove nail polish  [No reading material] : no reading material  [Bra, undergarments removed] : bra, undergarments removed  [No contraindicated dressings] : no contraindicated dressings [Ground Wire - VISUAL Verification - Intact/Free of Obstruction] : Ground Wire - VISUAL Verification - Intact/Free of Obstruction  [Ground Continuity - Verified < 1 ohm w/ Wrist Strap Eagle] : Ground Continuity - Verified < 1 ohm w/ Wrist Strap Eagle [Number: ___] : Number: [unfilled] [Diagnosis: ___] : Diagnosis: [unfilled] [____] : Recheck: Time - [unfilled] [___] : Post-Dive: Value - [unfilled] mg/dL [Clear all fields] : clear all fields [] : No [FreeTextEntry4] : 100 [FreeTextEntry6] : 4719 [FreeTextEntry8] : 7014 [de-identified] : 4191 [de-identified] : 2151 [de-identified] : 0106 [de-identified] : 2916 [de-identified] : 4112 [de-identified] : 120 MIN

## 2022-05-17 NOTE — ADDENDUM
[FreeTextEntry1] : Due to fall @ home on 05/14/22 pt was evaluated by nurse and wound care provided pre tx.\par Patient descended to 2.4 UMER @ 2.2 PSI/MIN without incident in chamber #2.\par Patient resting comfortably @ depth, equal chest rise observed throughout treatment.\par \par Patients Order Verified Prior to Treatment. \par Patients Secondary Contraindication Examination Check Preformed by T. \par Patients Legs Elevated via Double Leg Wedge to Offload Patients Wound.\par \par Patients Care Transferred technician at 1310hrs., and SBAR discussed (Situation Background Assessment Recommendation).\par \par Note locked by Md prior to V/S entry of post vitals \par 123/78 left arm\par 100% Spo2\par 63 Bpm\par 97.0 temp\par 20 resp\par Care transferred from technician to CHT on ascent.\par Pt ascent was without incident. Pt tolerated hbot well. Pt was escorted off unit by aide\par \par \par Cht Dilcia 05/16/22\par \par \par \par

## 2022-05-18 NOTE — PROCEDURE
[Outpatient] : Outpatient [Ambulatory] : Patient is ambulatory. [Walker] : walker [THIS CHAMBER HAS BEEN CLEANED / DISINFECTED] : This chamber has been cleaned / disinfected according to local and hospital policy and procedure prior to this treatment. [____] : Post-Dive: Time - [unfilled] [___] : Post-Dive: Value - [unfilled] mg/dL [Patient demonstrated and verbalized proper technique for using air break mask] : Patient demonstrated and verbalized proper technique for using air break mask [Patient educated on the risks of SMOKING prior to HBOT with understanding] : Patient educated on the risks of SMOKING prior to HBOT with understanding [Patient educated on the risks of CONSUMING ALCOHOL prior to HBOT with understanding] : Patient educated on the risks of CONSUMING ALCOHOL prior to HBOT with understanding [100% Cotton] : 100% cotton [Empty all pockets] : empty all pockets [No hair oils, wigs, hairpieces, pins] : no hair oils, wigs, hairpieces, pins  [Pre tx medications] : pre tx medications  [No make-up, creams] : no make-up, creams  [No jewelry] : no jewelry  [No matches, cigarettes, lighters] : no matches, cigarettes, lighters  [Hearing aid removed] : hearing aid removed [Dentures removed] : dentures removed [Ground bracelet on pt's wrist] : ground bracelet on pt's wrist  [Contacts removed] : contacts removed  [Remove nail polish] : remove nail polish  [No reading material] : no reading material  [Bra, undergarments removed] : bra, undergarments removed  [No contraindicated dressings] : no contraindicated dressings [Ground Wire - VISUAL Verification - Intact/Free of Obstruction] : Ground Wire - VISUAL Verification - Intact/Free of Obstruction  [Ground Continuity - Verified < 1 ohm w/ Wrist Strap Eagle] : Ground Continuity - Verified < 1 ohm w/ Wrist Strap Eagle [Number: ___] : Number: [unfilled] [Diagnosis: ___] : Diagnosis: [unfilled] [Clear all fields] : clear all fields [] : No [FreeTextEntry2] : MD HBOT Rx Verified Prior to Start of HBOT (Physical Copy pending Scan) [FreeTextEntry4] : 65 [FreeTextEntry6] : 13 : 31 [FreeTextEntry8] : 13 : 41 [de-identified] : 14 : 11 [de-identified] : 14 : 16 [de-identified] : -- N / A -- [de-identified] : -- N / A -- [de-identified] : 14 : 46 [de-identified] : 14 : 56 [de-identified] : 85 min.

## 2022-05-18 NOTE — ADDENDUM
[FreeTextEntry1] : The pt. presented to [facility] ambulating with walker, A&Ox3 and was approximately 90 min. late for scheduled arrival to [facility]. \par Per pt., he did not have transportation and was nearly unable to come to [facility].\par Pt. stated he thought that a family member had contacted the [facility] to advise of his lateness. \par Pt. advised no call had been received.\par Pt. was advised that per MD, the pt. has a single-day order for an abbreviated HBOT tx. due to patients scheduled to receive HBOT after him today and physician coverage restrictions. \par The pt. was advised that today he will receive an 85 min. TT HBOT tx., with 60 min. of O2 and one med. air interval. Pt. was further advised that same is therapuetic and pt. will see benefit. Pt. acknowledged same and consented to receive HBOT. \par MD HBOT Rx (1 tx. only) was visualized after signature x CHT prior to being designated for AEHR scanning..\par \par The pt's pre-dive screening found the pt. to be within acceptable limits to begin HBOT.\par The pt's wound dressings were observed to be clean,d ry, and intact prior to the start of HBOT.\par The pt. was provided lower extremity off-loading/elevation measure prior to start of HBOT; \par Pt. expressed comfort after placement of same.\par The pt. descended @ 2.2 PSI/min. to Rx'd HBOT tx. depth of 2.4 UMER in chamber # 4 without incident.\par The pt. was observed with visible chest motion and without incident for the duration of HBOT.\par The pt. was provided one med. air interval over the course of HBOT without incident.\par The pt. ascended from tx. depth to surface without incident.\par The pt. tolerated HBOT without incident. \par  The pt's personal effects (B/L hearing aids, B/L knee pads, abdominal girdle) were returned to the pt. post-HBOT.\par \par

## 2022-05-19 NOTE — ADDENDUM
[FreeTextEntry1] : The pt. presented to [facility] ambulating with cane and was A&Ox3 for scheduled HBOT.\par The pt's pre-dive screening was found to be within acceptable limits to begin HBOT. Pt was provided with a leg wedge for offloading and comfort throughout tx.\par pt was seen with a lidocane patch applied, patch removed prior to tx. \par pt dressing on R shoulder was seen to be Green,\par pt forehead seen to have ointment,  nurse notified and changed dressing and R shoulder and forehead.\par pt descended to tx depth pf 2.4ata @2.2psi/min in chamber 1\par pt resting at depth with visible chest rise and fall observed chamberside\par pt tolerated both intermediate air breaks well\par pt ascended without incident in chamber #2\par pt received wc post hbot without incident

## 2022-05-19 NOTE — PROCEDURE
[Outpatient] : Outpatient [Ambulatory] : Patient is ambulatory. [Cane] : cane [THIS CHAMBER HAS BEEN CLEANED / DISINFECTED] : This chamber has been cleaned / disinfected according to local and hospital policy and procedure prior to this treatment. [____] : Pre-Dive: Time - [unfilled] [___] : Pre-Dive: Value - [unfilled] mg/dL [Patient demonstrated and verbalized proper technique for using air break mask] : Patient demonstrated and verbalized proper technique for using air break mask [Patient educated on the risks of SMOKING prior to HBOT with understanding] : Patient educated on the risks of SMOKING prior to HBOT with understanding [Patient educated on the risks of CONSUMING ALCOHOL prior to HBOT with understanding] : Patient educated on the risks of CONSUMING ALCOHOL prior to HBOT with understanding [100% Cotton] : 100% cotton [Empty all pockets] : empty all pockets [No hair oils, wigs, hairpieces, pins] : no hair oils, wigs, hairpieces, pins  [Pre tx medications] : pre tx medications  [No make-up, creams] : no make-up, creams  [No jewelry] : no jewelry  [No matches, cigarettes, lighters] : no matches, cigarettes, lighters  [Hearing aid removed] : hearing aid removed [Dentures removed] : dentures removed [Ground bracelet on pt's wrist] : ground bracelet on pt's wrist  [Contacts removed] : contacts removed  [Remove nail polish] : remove nail polish  [No reading material] : no reading material  [Bra, undergarments removed] : bra, undergarments removed  [No contraindicated dressings] : no contraindicated dressings [Ground Wire - VISUAL Verification - Intact/Free of Obstruction] : Ground Wire - VISUAL Verification - Intact/Free of Obstruction  [Ground Continuity - Verified < 1 ohm w/ Wrist Strap Eagle] : Ground Continuity - Verified < 1 ohm w/ Wrist Strap Eagle [Number: ___] : Number: [unfilled] [Diagnosis: ___] : Diagnosis: [unfilled] [Clear all fields] : clear all fields [] : No [FreeTextEntry4] : 100 minutes [FreeTextEntry6] : 1365 [FreeTextEntry8] : 8734 [de-identified] : 2822 [de-identified] : 7820 [de-identified] : 2567 [de-identified] : 6389 [de-identified] : 6124 [de-identified] : 6764 [de-identified] : 120 minutes

## 2022-05-19 NOTE — PROCEDURE
[Outpatient] : Outpatient [Ambulatory] : Patient is ambulatory. [Cane] : cane [THIS CHAMBER HAS BEEN CLEANED / DISINFECTED] : This chamber has been cleaned / disinfected according to local and hospital policy and procedure prior to this treatment. [____] : Pre-Dive: Time - [unfilled] [___] : Pre-Dive: Value - [unfilled] mg/dL [Patient demonstrated and verbalized proper technique for using air break mask] : Patient demonstrated and verbalized proper technique for using air break mask [Patient educated on the risks of SMOKING prior to HBOT with understanding] : Patient educated on the risks of SMOKING prior to HBOT with understanding [Patient educated on the risks of CONSUMING ALCOHOL prior to HBOT with understanding] : Patient educated on the risks of CONSUMING ALCOHOL prior to HBOT with understanding [100% Cotton] : 100% cotton [Empty all pockets] : empty all pockets [No hair oils, wigs, hairpieces, pins] : no hair oils, wigs, hairpieces, pins  [Pre tx medications] : pre tx medications  [No make-up, creams] : no make-up, creams  [No jewelry] : no jewelry  [No matches, cigarettes, lighters] : no matches, cigarettes, lighters  [Hearing aid removed] : hearing aid removed [Dentures removed] : dentures removed [Ground bracelet on pt's wrist] : ground bracelet on pt's wrist  [Contacts removed] : contacts removed  [Remove nail polish] : remove nail polish  [No reading material] : no reading material  [Bra, undergarments removed] : bra, undergarments removed  [No contraindicated dressings] : no contraindicated dressings [Ground Wire - VISUAL Verification - Intact/Free of Obstruction] : Ground Wire - VISUAL Verification - Intact/Free of Obstruction  [Ground Continuity - Verified < 1 ohm w/ Wrist Strap Eagle] : Ground Continuity - Verified < 1 ohm w/ Wrist Strap Eagle [Number: ___] : Number: [unfilled] [Diagnosis: ___] : Diagnosis: [unfilled] [Clear all fields] : clear all fields [] : No [FreeTextEntry4] : 100 minutes [FreeTextEntry6] : 8396 [FreeTextEntry8] : 0255 [de-identified] : 9454 [de-identified] : 1826 [de-identified] : 6439 [de-identified] : 6273 [de-identified] : 3396 [de-identified] : 5740 [de-identified] : 120 minutes

## 2022-05-26 NOTE — PROCEDURE
[] : No [FreeTextEntry2] : MD Rx Verified x HT Prior to HBOT [FreeTextEntry4] : 100 [FreeTextEntry6] : 3433 [FreeTextEntry8] : 6215 [de-identified] : 9935 [de-identified] : 9899 [de-identified] : 13 : 47 [de-identified] : 13 : 52 [de-identified] : 14 : 22 [de-identified] : 14 : 32 [de-identified] : 120 min.

## 2022-05-26 NOTE — ADDENDUM
[FreeTextEntry1] : PT ARRIVED A&OX3 WITH THE USE OF A WALKER AND CANE \par ALL VITALS WITHIN PARAMETERS FOR HBOT\par PT DRESSING NOTICED TO BE WET PRE HBOT, PT ADMITS TO GETTING THE DRESSING WET THIS MORNING WHILE ATTEMPTING TO SHOWER\par PT DRESSING CHANGED PRE HBOT BY RN \par PT HAD CONTRAINDICATED VASELINE ON FOREHEAD AND UNDER RIGHT EYE.\par VASELINE REMOVED WITH SOAP AND WATER PRE HBOT\par PT REQUESTED OFF LOADING MEASURES VIA WEDGE\par PT EXPRESSED COMFORT AND SATISFACTION IN PLACEMENT OF WEDGE PRE HBOT \par PT PRE-DIVE CHECKLIST SIGNED AND WITNESSED BY T\par PT DESCENDED TO 2.4 UMER @ 2.2 PSI/MIN IN CHAMBER #3 WITHOUT INCIDENT\par PT RESTING AT TX DEPTH WITH VISIBLE CHEST RISE AND FALL OBSERVED CHAMBER SIDE  \par PT TOLERATED AIR BREAK WELL.\par Transfer of Observation from  to [facility] @ 13:40 (with 37 min BT remaining).\par The pt. was observed with visible chest motion and without incident for the duration of observed HBOT.\par The pt. received intermittent med. air interval over course of observed HBOT without incident. \par Transfer of Care upon pt's end of HBOT.\par Pt's wound care performed x RN post-HBOT.\par Pt. was accompanied to pt. changing room and to [facility] entrance for discharge with use of his personal walker-chair with additional use of cane for ambulation assistance post-HBOT. \par \par Post-tx., the pt. was advised that his walker-chair was found to have loose components and connection points throughout, including at handle bars. \par The pt. was advised to seek to secure the walker-chair and of locations where qualified personnel can assist him in performance of same. \par The pt. was additionally provided scenarios in which walker-chair's loose connection points can cause or contribute to harm. \par The pt. expressed understanding of same. \par RN was advised and will F/U with the pt's family to discuss same.

## 2022-05-26 NOTE — ADDENDUM
[FreeTextEntry1] : PT ARRIVED A&OX3 WITH THE USE OF A WALKER\par ALL VITALS WITHIN PARAMETERS FOR HBOT\par NO DRAINAGE NOTED ON ANY DRESSINGS\par PT HEARING AIDS REMOVED PRE HBOT \par PT PROVIDED WITH OFFLOADING MEASURES PRE HBOT, PT EXPRESSED SATISFACTION AND COMFORT IN PLACEMENT OF THE WEDGE\par PT DID HAVE CONTRAINDICATED BAND-AID ON RIGHT ARM, AS WELL AS VASELINE ON FOREHEAD\par VASELINE WAS TAKEN OFF WITH SOAP AND WATER PRE HBOT, AND BAND-AID WAS REMOVED  \par PT PRE-DIVE CHECKLIST SIGNED AND WITNESSED BY CHT \par PT DESCENDED TO 2.4 UMER @ 2.2 PSI/MIN IN CHAMBER #4 WITHOUT INCIDENT\par PT RESTING AT TX DEPTH WITH VISIBLE CHEST RISE AND FALL OBSERVED CHAMBER SIDE\par pt tolerated both intermediate air breaks well \par pt ascended from tx depth without incident in chamber #4 \par pt tolerated tx well\par pt put in hearing aids without incident \par

## 2022-05-26 NOTE — PROCEDURE
[Outpatient] : Outpatient [Ambulatory] : Patient is ambulatory. [Walker] : walker [THIS CHAMBER HAS BEEN CLEANED / DISINFECTED] : This chamber has been cleaned / disinfected according to local and hospital policy and procedure prior to this treatment. [____] : Pre-Dive: Time - [unfilled] [___] : Pre-Dive: Value - [unfilled] mg/dL [Patient demonstrated and verbalized proper technique for using air break mask] : Patient demonstrated and verbalized proper technique for using air break mask [Patient educated on the risks of SMOKING prior to HBOT with understanding] : Patient educated on the risks of SMOKING prior to HBOT with understanding [Patient educated on the risks of CONSUMING ALCOHOL prior to HBOT with understanding] : Patient educated on the risks of CONSUMING ALCOHOL prior to HBOT with understanding [100% Cotton] : 100% cotton [Empty all pockets] : empty all pockets [No hair oils, wigs, hairpieces, pins] : no hair oils, wigs, hairpieces, pins  [Pre tx medications] : pre tx medications  [No make-up, creams] : no make-up, creams  [No jewelry] : no jewelry  [No matches, cigarettes, lighters] : no matches, cigarettes, lighters  [Hearing aid removed] : hearing aid removed [Dentures removed] : dentures removed [Ground bracelet on pt's wrist] : ground bracelet on pt's wrist  [Contacts removed] : contacts removed  [Remove nail polish] : remove nail polish  [No reading material] : no reading material  [Bra, undergarments removed] : bra, undergarments removed  [No contraindicated dressings] : no contraindicated dressings [Ground Wire - VISUAL Verification - Intact/Free of Obstruction] : Ground Wire - VISUAL Verification - Intact/Free of Obstruction  [Ground Continuity - Verified < 1 ohm w/ Wrist Strap Eagle] : Ground Continuity - Verified < 1 ohm w/ Wrist Strap Eagle [Number: ___] : Number: [unfilled] [Diagnosis: ___] : Diagnosis: [unfilled] [Clear all fields] : clear all fields [] : No [FreeTextEntry2] : MD RX VERIFIED  [FreeTextEntry4] : 100 minutes [FreeTextEntry6] : 9540 [FreeTextEntry8] : 4495 [de-identified] : 8213 [de-identified] : 1388 [de-identified] : 0028 [de-identified] : 6068 [de-identified] : 5112 [de-identified] : 4790 [de-identified] : 120 minutes

## 2022-05-27 NOTE — ADDENDUM
[FreeTextEntry1] : The pt. presented to [facility] ambulating with use of walker and accompanied x HHA and A&Ox3 for scheduled HBOT.\par The pt's pre-dive screening was found to be within acceptable limits to begin HBOT x CHT.\par "HBO-TO" secondary PDS performed x CHT/HSD.\par Pt's personal items (knee pads, abd. girdle, hearing aids) were stored chamberside prior to start of HBOT.\par Pt. was encouraged to hydrate pre- and intra- HBOT. Pt. complied.\par The pt. was provided B/L off-loading measure prior to start of HBOT; pt. expressed comfort after placement of same.\par The pt. descended @ 2.2 PSI/min. to Rx'd HBOT tx. depth of 2.4 UMER in chamber # 3 without incident.\par The pt. was observed with visible chest motion and without incident for the duration of HBOT.\par The pt. was administered intermittent med. air over course of HBOT without incident.\par The pt. ascended from tx. depth to surface without incident. \par The pt. tolerated HBOT without incident.\par The pt. received wound care post-HBOT. \par The pt's personal effects were returned and/or replaced.\par The pt. was accompanied by RN to changing room post-care.

## 2022-05-27 NOTE — PROCEDURE
[Outpatient] : Outpatient [Ambulatory] : Patient is ambulatory. [Walker] : walker [THIS CHAMBER HAS BEEN CLEANED / DISINFECTED] : This chamber has been cleaned / disinfected according to local and hospital policy and procedure prior to this treatment. [____] : Post-Dive: Time - [unfilled] [___] : Post-Dive: Value - [unfilled] mg/dL [Patient demonstrated and verbalized proper technique for using air break mask] : Patient demonstrated and verbalized proper technique for using air break mask [Patient educated on the risks of SMOKING prior to HBOT with understanding] : Patient educated on the risks of SMOKING prior to HBOT with understanding [Patient educated on the risks of CONSUMING ALCOHOL prior to HBOT with understanding] : Patient educated on the risks of CONSUMING ALCOHOL prior to HBOT with understanding [100% Cotton] : 100% cotton [Empty all pockets] : empty all pockets [No hair oils, wigs, hairpieces, pins] : no hair oils, wigs, hairpieces, pins  [Pre tx medications] : pre tx medications  [No make-up, creams] : no make-up, creams  [No jewelry] : no jewelry  [No matches, cigarettes, lighters] : no matches, cigarettes, lighters  [Dentures removed] : dentures removed [Ground bracelet on pt's wrist] : ground bracelet on pt's wrist  [Contacts removed] : contacts removed  [Remove nail polish] : remove nail polish  [No reading material] : no reading material  [Bra, undergarments removed] : bra, undergarments removed  [No contraindicated dressings] : no contraindicated dressings [Ground Wire - VISUAL Verification - Intact/Free of Obstruction] : Ground Wire - VISUAL Verification - Intact/Free of Obstruction  [Ground Continuity - Verified < 1 ohm w/ Wrist Strap Eagle] : Ground Continuity - Verified < 1 ohm w/ Wrist Strap Eagle [Number: ___] : Number: [unfilled] [Diagnosis: ___] : Diagnosis: [unfilled] [Hearing aid removed] : hearing aid removed [Clear all fields] : clear all fields [] : No [FreeTextEntry2] : MD Rx Verified [FreeTextEntry4] : 100 [FreeTextEntry6] : 12 : 28 [FreeTextEntry8] : 12 : 38 [de-identified] : 13 : 08 [de-identified] : 13 : 13 [de-identified] : 13 : 43 [de-identified] : 13 : 48 [de-identified] : 14 : 18 [de-identified] : 14 : 28 [de-identified] : 120 min.

## 2022-05-30 PROBLEM — I95.1 ORTHOSTATIC HYPOTENSION: Status: ACTIVE | Noted: 2022-01-01

## 2022-05-30 NOTE — PLAN
[FreeTextEntry1] : 1. foot ulcer- went home with home care - wound care and HHA still coming for home visits. \par cardiologist- Dr Gu\par 2. concerned about blood sugars: will check a1c, insulin levels, s/p whipple\par 3. fatigue/ history of anemia/ not sleeping: check cbc, iron\par 4. frequent stools, on creon: f/u with Gi\par 5. insomnia: Melatonin and restart trazodone. Take Cymbalta at night\par 6. [place]: f/u with ENT

## 2022-05-30 NOTE — HISTORY OF PRESENT ILLNESS
[FreeTextEntry1] : f/u on episode of confusion last week [de-identified] : 5/27/2022: Patient was brought to family's home after going to chamber. was confused, having difficulty articulating. EMS called, and found to be hypotensive. brought to ED, where he left without being seen after a few hours. Has been feeling well ever since. Stopped lasix, and decreased metoprolol  [Post-hospitalization from ___ Hospital] : Post-hospitalization from [unfilled] Hospital [Admitted on: ___] : The patient was admitted on [unfilled] [Discharge Summary] : discharge summary [Pertinent Labs] : pertinent labs [Discharge Med List] : discharge medication list [Med Reconciliation] : medication reconciliation has been completed [Patient Contacted By: ____] : and contacted by [unfilled] [FreeTextEntry2] : 1. foot ulcer- went home with home care - wound care and HHA still coming for home visits. \par cardiologist- Dr Gu\par 2. concerned about blood sugars\par 3. fatigue/ history of anemia/ not sleeping\par 4. frequent stools, on creon, \par 5. insomnia\par 6. [place]\par 5. anxiety\par

## 2022-05-31 NOTE — ADDENDUM
[FreeTextEntry1] : PT ARRIVED A&OX3 WITH THE USE OF A WALKER\par ALL VITALS WITHIN PARAMETERS FOR HBOT\par NO DRAINAGE NOTED ON DRESSINGS PRE HBOT \par PT REQUESTED OFFLOADING MEASURES PRE HBOT, PT EXPRESSED SATISFACTION AND COMFORT WITH THE PLACEMENT OF WEDGE\par PT PRE-DIVE CHECKLIST SIGNED AND WITNESSED BY CHT \par PT DESCENDED TO 2.4 UMER @ 2.2 PSI/MIN IN CHAMBER #4  WITHOUT INCIDENT\par PT RESTING AT TX DEPTH WITH VISIBLE CHEST RISE AND FALL OBSERVED CHAMBER SIDE\par pt tolerated both intermediate air breaks well\par pt ascended from tx depth without incident in chamber #4 \par pt tolerated tx well\par pt walked to dressing room and safely out of the building \par

## 2022-06-02 NOTE — ADDENDUM
[FreeTextEntry1] : PT ARRIVED A&OX3 WITH THE USE OF A WALKER\par ALL VITALS WITHIN PARAMETERS FOR HBOT\par PT REQUESTED OFFLOADING MEASURES VIA LEG WEDGE\par PT EXPRESSED SATISFACTION AND COMFORT IN PLACEMENT OF WEDGE\par PT REQUESTED ROLLED BLANKET UNDER RIGHT SHOULDER, PT EXPRESSED COMFORT AND SATISFACTION IN PLACEMENT OF BLANKET\par PT PRE-DIVE CHECKLIST SIGNED AND WITNESSED BY CHT\par The pt. advised that he had forgotten B/L hearing aids at home this morning. \par The pt. presented to [facility] with titanium eyeglasses. Eyeglasses were removed and stored at the pt's gurney for the duration of HBOT. Upon questioning, the pt. was provided an explanation for rationale for titanium disallowance in HBO chamber. Pt. expressed satisfaction after same. \par PT DESCENDED TO 2.4 UMER @ 2.2 PSI/MIN IN CHAMBER #4  WITHOUT INCIDENT\par PT RESTING AT TX DEPTH WITH VISIBLE CHEST RISE AND FALL OBSERVED CHAMBER SIDE\par PT TOLERATED AIR BREAKS WELL\par PT ASCENDED FROM 2.4 UMER @ 2.2 PSI/MIN WITHOUT INCIDENT\par PT TOLERATED TX WELL\par Post-tx., the pt. was accompanied in his walker-assisted ambulation from HBO suite to HBO changing room and from changing room to [facility] exit accompanied by [facility] CHT.\par \par

## 2022-06-02 NOTE — PROCEDURE
[Outpatient] : Outpatient [Ambulatory] : Patient is ambulatory. [Walker] : walker [THIS CHAMBER HAS BEEN CLEANED / DISINFECTED] : This chamber has been cleaned / disinfected according to local and hospital policy and procedure prior to this treatment. [Patient demonstrated and verbalized proper technique for using air break mask] : Patient demonstrated and verbalized proper technique for using air break mask [Patient educated on the risks of SMOKING prior to HBOT with understanding] : Patient educated on the risks of SMOKING prior to HBOT with understanding [Patient educated on the risks of CONSUMING ALCOHOL prior to HBOT with understanding] : Patient educated on the risks of CONSUMING ALCOHOL prior to HBOT with understanding [100% Cotton] : 100% cotton [Empty all pockets] : empty all pockets [No hair oils, wigs, hairpieces, pins] : no hair oils, wigs, hairpieces, pins  [Pre tx medications] : pre tx medications  [No make-up, creams] : no make-up, creams  [No jewelry] : no jewelry  [No matches, cigarettes, lighters] : no matches, cigarettes, lighters  [Hearing aid removed] : hearing aid removed [Dentures removed] : dentures removed [Ground bracelet on pt's wrist] : ground bracelet on pt's wrist  [Contacts removed] : contacts removed  [Remove nail polish] : remove nail polish  [No reading material] : no reading material  [Bra, undergarments removed] : bra, undergarments removed  [No contraindicated dressings] : no contraindicated dressings [Ground Wire - VISUAL Verification - Intact/Free of Obstruction] : Ground Wire - VISUAL Verification - Intact/Free of Obstruction  [Ground Continuity - Verified < 1 ohm w/ Wrist Strap Eagle] : Ground Continuity - Verified < 1 ohm w/ Wrist Strap Eagle [Number: ___] : Number: [unfilled] [Diagnosis: ___] : Diagnosis: [unfilled] [____] : Post-Dive: Time - [unfilled] [___] : Post-Dive: Value - [unfilled] mg/dL [Clear all fields] : clear all fields [] : No [FreeTextEntry2] : MD RX VERIFIED  [FreeTextEntry4] : 100 [FreeTextEntry6] : 8522 [FreeTextEntry8] : 7209 [de-identified] : 1814 [de-identified] : 2416 [de-identified] : 5610 [de-identified] : 6156 [de-identified] : 2054 [de-identified] : 6640 [de-identified] : 120 MINUTES

## 2022-06-05 NOTE — PROGRESS NOTE ADULT - ASSESSMENT
Mr. 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 Given recent TAVR, transfered [facility] for CORBIN and consideration of ICD extraction. Return to your regular way of eating.  Resume normal activity as tolerated, but no heavy lifting or strenuous activity for 2 weeks.  No driving for next 2 weeks and/or while on narcotic pain medication.  Complete vaginal rest, no tampons, no douching, no tub bathing, no sexual activities for 2 weeks unless otherwise instructed by your doctor.  Call your doctor with any signs and symptoms of infection such as fever, chills, nausea or vomiting.  Call your doctor with redness or swelling at the incision site, fluid leakage or wound separation.  Call your doctor if you're unable to tolerate food or have difficulty urinating.  Call your doctor if you have pain that is not relieved by your prescribed medications.  Notify your doctor with any other concerns.  Follow up with  *** in ***.

## 2022-06-06 NOTE — PHYSICAL EXAM
[Abdominal Pad] : Abdominal Pad [4 x 4] : 4 x 4  [Normal Thyroid] : the thyroid was normal [Normal Breath Sounds] : Normal breath sounds [Normal Heart Sounds] : normal heart sounds [Normal Rate and Rhythm] : normal rate and rhythm [Alert] : alert [Oriented to Person] : oriented to person [Oriented to Place] : oriented to place [Oriented to Time] : oriented to time [Calm] : calm [JVD] : no jugular venous distention  [Abdomen Masses] : No abdominal massess [Abdomen Tenderness] : ~T ~M No abdominal tenderness [Enlarged] : not enlarged [de-identified] : adult WM, NAD, alert, Ox3. [FreeTextEntry2] : 13.5 [FreeTextEntry3] : 8.6 [FreeTextEntry4] : 0.2 [de-identified] : 100% [FreeTextEntry5] : none [FreeTextEntry7] : Right Anterior Shoulder - Dry eschar [de-identified] : None [de-identified] : 100% [de-identified] : No product [de-identified] : Mechanically cleansed with sterile gauze and normal saline 0.9%\par  [de-identified] : Right Foot 2nd Digit  [de-identified] : 0.5 [de-identified] : 0.4 [de-identified] : 0.2 [de-identified] : serosanguineous [de-identified] : Alginate Ag [de-identified] : Mechanically cleansed with sterile gauze and normal saline 0.9%\par Dry Dressing\par \par \par  [FreeTextEntry1] : Forehead Laceration - Sutures removed by MD  [de-identified] : None [de-identified] : none [de-identified] : Mupirocin [de-identified] : Mechanically cleansed with sterile gauze and normal saline 0.9%\par Dry Dressing\par  [TWNoteComboBox5] : No [de-identified] : No [de-identified] : 3x Weekly [de-identified] : Primary Dressing [de-identified] : Traumatic [de-identified] : No [de-identified] : Normal [de-identified] : None [de-identified] : Small [de-identified] : No [de-identified] : Other [de-identified] : No [de-identified] : Normal [de-identified] : None [de-identified] : 100% [de-identified] : 3x Weekly [de-identified] : Primary Dressing [TWNoteComboBox6] : Traumatic [de-identified] : No [de-identified] : Normal [de-identified] : None [de-identified] : 100% [de-identified] : No [de-identified] : Daily [de-identified] : Primary Dressing

## 2022-06-06 NOTE — HISTORY OF PRESENT ILLNESS
[FreeTextEntry1] : 80 yo WM, here for his weekly assessment. 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 Pt with a h/o pancreatic CA. The wound continues to have mod drainage. No change in size since last assessment. Discussed possible option of skin grafting via plastic surgery.\par \par 5/9/22 abdominal wound smaller

## 2022-06-06 NOTE — PHYSICAL EXAM
[Abdominal Pad] : Abdominal Pad [4 x 4] : 4 x 4  [Normal Thyroid] : the thyroid was normal [Normal Breath Sounds] : Normal breath sounds [Normal Heart Sounds] : normal heart sounds [Normal Rate and Rhythm] : normal rate and rhythm [Alert] : alert [Oriented to Person] : oriented to person [Oriented to Place] : oriented to place [Oriented to Time] : oriented to time [Calm] : calm [JVD] : no jugular venous distention  [Abdomen Masses] : No abdominal massess [Abdomen Tenderness] : ~T ~M No abdominal tenderness [Enlarged] : not enlarged [de-identified] : adult WM, NAD, alert, Ox3. [FreeTextEntry2] : 13.5 [FreeTextEntry3] : 8.6 [FreeTextEntry4] : 0.2 [de-identified] : 100% [FreeTextEntry5] : none [FreeTextEntry7] : Right Anterior Shoulder - Dry eschar [de-identified] : None [de-identified] : 100% [de-identified] : No product [de-identified] : Mechanically cleansed with sterile gauze and normal saline 0.9%\par  [de-identified] : Right Foot 2nd Digit  [de-identified] : 0.5 [de-identified] : 0.4 [de-identified] : 0.2 [de-identified] : serosanguineous [de-identified] : Alginate Ag [de-identified] : Mechanically cleansed with sterile gauze and normal saline 0.9%\par Dry Dressing\par \par \par  [FreeTextEntry1] : Forehead Laceration - Sutures removed by MD  [de-identified] : None [de-identified] : none [de-identified] : Mupirocin [de-identified] : Mechanically cleansed with sterile gauze and normal saline 0.9%\par Dry Dressing\par  [TWNoteComboBox5] : No [de-identified] : No [de-identified] : 3x Weekly [de-identified] : Primary Dressing [de-identified] : Traumatic [de-identified] : No [de-identified] : Normal [de-identified] : None [de-identified] : Small [de-identified] : No [de-identified] : Other [de-identified] : No [de-identified] : Normal [de-identified] : None [de-identified] : 100% [de-identified] : 3x Weekly [de-identified] : Primary Dressing [TWNoteComboBox6] : Traumatic [de-identified] : No [de-identified] : Normal [de-identified] : None [de-identified] : 100% [de-identified] : No [de-identified] : Daily [de-identified] : Primary Dressing

## 2022-06-06 NOTE — ASSESSMENT
[Verbal] : Verbal [Written] : Written [Demo] : Demo [Patient] : Patient [Good - alert, interested, motivated] : Good - alert, interested, motivated [Demonstrates independently] : demonstrates independently [Dressing changes] : dressing changes [Foot Care] : foot care [Skin Care] : skin care [Signs and symptoms of infection] : sign and symptoms of infection [Nutrition] : nutrition [How and When to Call] : how and when to call [Hyperbaric Therapy] : hyperbaric therapy [Off-loading] : off-loading [Home Health] : home health [Patient responsibility to plan of care] : patient responsibility to plan of care [] : Yes [Stable] : stable [Home] : Home [Walker] : Walker [Faxed - Long Term Care/Home Health Agency] : Long Term Care/Home Health Agency: Faxed [FreeTextEntry2] : Infection prevention \par Wound care (dressing changes)\par Maintain optimal skin integrity to high pressure areas\par Compression therapy\par Nutrition and wound healing\par Elevation and low sodium compliance.\par Offloading the stress on skin structures and decreasing potential pathologic biomechanical influences.\par HBOT.\par Physical therapy f/u\par Homecare [FreeTextEntry4] : Pt completed 31/40 HBOT. Auth submitted for + 10 treatments = 50 total\par COVID 19 PCR swab obtained. Pt tolerated without incident.\par Homecare initiated. Physical therapy to be initiated by homecare.\par Dr. Villeda consult submitted.\par Pt to f/u daily for HBOT. Assessment in 2 weeks \par Pt aware to not report to HBOT on Monday, 5/30/22 due to unit closure for [facility] day. [FreeTextEntry1] : SERGO Initiated.

## 2022-06-06 NOTE — HISTORY OF PRESENT ILLNESS
[FreeTextEntry1] : 82 yo WM, here for his weekly assessment. 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 Pt with a h/o pancreatic CA. The wound continues to have mod drainage. No change in size since last assessment. Discussed possible option of skin grafting via plastic surgery.\par \par 5/9/22 abdominal wound smaller

## 2022-06-06 NOTE — PLAN
[FreeTextEntry1] : shweta, alginate, DD, abd pad abdomen QOD\par refer to plastic surgery for possible STSG\par continue HBO tx\par f/u 2 wk\par \par time spent 25 mins.

## 2022-06-08 NOTE — PHYSICAL EXAM
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 [de-identified] : right hallux and 1st metatarsal head amputation site healed, right 2nd dorsal PIPJ ulcer healed [de-identified] : diminished light touch sensation bilaterally [FreeTextEntry2] : 13.5 [FreeTextEntry3] : 8.6 [FreeTextEntry4] : 0.2 [de-identified] : 100% [FreeTextEntry5] : none [FreeTextEntry7] : Right Anterior Shoulder- CLOSED [de-identified] : None [de-identified] : 100% [de-identified] : No treatment required  [de-identified] : Mechanically cleansed with sterile gauze and normal saline 0.9%\par  [de-identified] : Right Foot 2nd Digit- Dry eschar [de-identified] : 0.5 [de-identified] : 0.4 [de-identified] : 0.2 [de-identified] : serosanguineous [de-identified] : No treatment required [de-identified] : Mechanically cleansed with sterile gauze and normal saline 0.9%\par \par \par  [FreeTextEntry1] : Forehead Laceration - RESOLVED [de-identified] : None [de-identified] : none [de-identified] : No treatment required  [de-identified] : Mechanically cleansed with sterile gauze and normal saline 0.9%\par \par  [TWNoteComboBox1] : Midline [TWNoteComboBox5] : No [de-identified] : No [de-identified] : Traumatic [de-identified] : No [de-identified] : Normal [de-identified] : None [de-identified] : Small [de-identified] : No [de-identified] : Other [de-identified] : No [de-identified] : Normal [de-identified] : None [de-identified] : 100% [de-identified] : 3x Weekly [TWNoteComboBox6] : Traumatic [de-identified] : No [de-identified] : Normal [de-identified] : None [de-identified] : 100% [de-identified] : No [de-identified] : Daily

## 2022-06-08 NOTE — PROCEDURE
[Outpatient] : Outpatient [Ambulatory] : Patient is ambulatory. [Walker] : walker [THIS CHAMBER HAS BEEN CLEANED / DISINFECTED] : This chamber has been cleaned / disinfected according to local and hospital policy and procedure prior to this treatment. [Patient demonstrated and verbalized proper technique for using air break mask] : Patient demonstrated and verbalized proper technique for using air break mask [Patient educated on the risks of SMOKING prior to HBOT with understanding] : Patient educated on the risks of SMOKING prior to HBOT with understanding [Patient educated on the risks of CONSUMING ALCOHOL prior to HBOT with understanding] : Patient educated on the risks of CONSUMING ALCOHOL prior to HBOT with understanding [100% Cotton] : 100% cotton [Empty all pockets] : empty all pockets [No hair oils, wigs, hairpieces, pins] : no hair oils, wigs, hairpieces, pins  [Pre tx medications] : pre tx medications  [No make-up, creams] : no make-up, creams  [No jewelry] : no jewelry  [No matches, cigarettes, lighters] : no matches, cigarettes, lighters  [Hearing aid removed] : hearing aid removed [Dentures removed] : dentures removed [Ground bracelet on pt's wrist] : ground bracelet on pt's wrist  [Contacts removed] : contacts removed  [Remove nail polish] : remove nail polish  [No reading material] : no reading material  [Bra, undergarments removed] : bra, undergarments removed  [No contraindicated dressings] : no contraindicated dressings [Ground Wire - VISUAL Verification - Intact/Free of Obstruction] : Ground Wire - VISUAL Verification - Intact/Free of Obstruction  [Ground Continuity - Verified < 1 ohm w/ Wrist Strap Eagle] : Ground Continuity - Verified < 1 ohm w/ Wrist Strap Eagle [Number: ___] : Number: [unfilled] [Diagnosis: ___] : Diagnosis: [unfilled] [____] : Post-Dive: Time - [unfilled] [___] : Post-Dive: Value - [unfilled] mg/dL [Clear all fields] : clear all fields [] : No [FreeTextEntry2] : MD RX VERIFIED  [FreeTextEntry4] : 71 [[facility]] : 5083 [FreeTextEntry8] : 7852 [de-identified] : 3514 [de-identified] : 9934 [de-identified] : 4462 [de-identified] : 5277 [de-identified] : 4897 [de-identified] : 0007 [de-identified] : 84 MINUTES

## 2022-06-08 NOTE — PLAN
[FreeTextEntry1] : Patient examined and evaluated at this time.\par Continue local wound care and offloading.\par Continue HBOT at this time.\par Pt remains a high risk for further amputation, limb loss, sepsis, and death.\par Orthotist referral at this time.\par Spent 20 minutes for patient care and medical decision making.\par \par

## 2022-06-08 NOTE — ADDENDUM
[FreeTextEntry1] : The pt. presented to [facility] ambulating with walker and A&Ox3 for scheduled HBOT and accompanied by his HHA to HBO changing room.\par The pt. was accompanied x [facility] team member from changing room to HBO suite.\par The pt's pre-dive screening was performed x HT and found to be within acceptable limits to begin HBOT.\par "HBO-TO" secondary PDS performed x CHT and found the pt. fit to dive x2. \par \par The pt. was provided a bottle of water and encouraged to hydrate prior to start of HBOT due to observed systolic BP lower than pt's baseline when well hydrated.\par The pt. admitted to poor hydration compliance today.\par The pt. was provided off-loading/elevation measure for comfort to B/L lower extremities prior to start of HBOT.\par The pt. expressed comfort after placement of same.\par \par The pt's abdominal wound dressing was observed x HT to be loosening. \par  advised RN. RN inspected the pt's dressing.\par Per RN, the pt's abdominal dressing is not at risk of falling off. \par Per RN, he pt. may receive wound care post-tx. \par \par The pt. advised he would not be receiving HBOT tomorrow, FRI. 6/3/22, due to conflicting procedure @ [facility].\par \par The pt. descended @ 2.2 PSI/min. to Rx'd HBOT tx. depth of 2.4 UMER in chamber # 4 without incident.\par The pt. was observed with visible chest motion and without incident for the duration of HBOT.\par The pt. was administered intermittent med. air over course of HBOT without incident.\par Transfer of Observation from [facility] to  @ 13:09, at the conclusion of the pt's first med. air interval.\par Patient resting comfortably @ depth, equal chest rise observed throughout treatment.\par Patient tolerated both air breaks well.\par Patient ascended from 2.4 UMER @ 2.2 PSI/MIN without incident in chamber #4.\par Patient tolerated treatment well.\par Patient Exited the Hyperbaric Suite Safely\par Patients Dressing Changed Post Hyperbaric Treatment by Nurse. \par \par

## 2022-06-08 NOTE — HISTORY OF PRESENT ILLNESS
[FreeTextEntry1] : Pt seen for follow up of right 2nd dorsal PIPJ ulcer down to skin, subcutaneous tissue, and fat, healed at this time. Pt currently in HBOT at this time.

## 2022-06-08 NOTE — VITALS
[Pain related to present condition?] : The patient's  pain is not related to present condition. [] : No [de-identified] : 0/10

## 2022-06-08 NOTE — REVIEW OF SYSTEMS
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

## 2022-06-08 NOTE — ADDENDUM
[FreeTextEntry1] : Patients Order Verified Prior to Treatment. \par Patients Secondary Contraindication Examination Check Preformed by Nurse. \par Patients Contraindication List and Pre-Check List, Verified and Signed by Technician and Nurse Prior to Patients Treatment. \par Patients Legs Elevated via Double Leg Wedge to Offload Patients Wound.\par Patients Dressing Changed Prior Hyperbaric Treatment by Nurse. \par Patients hearing aides placed in a red cup and secured in pts locker.\par Patient descended to 2.4 UMER @ 2.2 PSI/MIN without incident in chamber #2.\par Patient resting comfortably @ depth, equal chest rise observed throughout treatment.\par PT TOLERATED AIR BREAKS WELL\par UPON PT'S SECOND AIR BREAK ENDING, PT EXPLAINED THAT HE HAD TO USE THE RESTROOM\par PT WAS ASKED IF HE COULD WAIT, BUT STATED HE WAS NOT GOING TO BE ABLE TO MAKE IT THROUGH THE FULL TREATMENT\par MD ALERTED, AND ADVISED TO ASCEND PT OUT OF THE CHAMBER\par PT ASCENDED FROM 2.4 UMER @ 2.5 PSI/MIN WITHOUT INCIDENT\par \par \par \par \par

## 2022-06-08 NOTE — VITALS
[Pain related to present condition?] : The patient's  pain is not related to present condition. [] : No [de-identified] : 0/10

## 2022-06-08 NOTE — PROCEDURE
[Outpatient] : Outpatient [Ambulatory] : Patient is ambulatory. [Walker] : walker [THIS CHAMBER HAS BEEN CLEANED / DISINFECTED] : This chamber has been cleaned / disinfected according to local and hospital policy and procedure prior to this treatment. [____] : Post-Dive: Time - [unfilled] [___] : Post-Dive: Value - [unfilled] mg/dL [Patient demonstrated and verbalized proper technique for using air break mask] : Patient demonstrated and verbalized proper technique for using air break mask [Patient educated on the risks of SMOKING prior to HBOT with understanding] : Patient educated on the risks of SMOKING prior to HBOT with understanding [Patient educated on the risks of CONSUMING ALCOHOL prior to HBOT with understanding] : Patient educated on the risks of CONSUMING ALCOHOL prior to HBOT with understanding [100% Cotton] : 100% cotton [Empty all pockets] : empty all pockets [No hair oils, wigs, hairpieces, pins] : no hair oils, wigs, hairpieces, pins  [Pre tx medications] : pre tx medications  [No make-up, creams] : no make-up, creams  [No jewelry] : no jewelry  [No matches, cigarettes, lighters] : no matches, cigarettes, lighters  [Hearing aid removed] : hearing aid removed [Dentures removed] : dentures removed [Ground bracelet on pt's wrist] : ground bracelet on pt's wrist  [Contacts removed] : contacts removed  [Remove nail polish] : remove nail polish  [No reading material] : no reading material  [Bra, undergarments removed] : bra, undergarments removed  [No contraindicated dressings] : no contraindicated dressings [Ground Wire - VISUAL Verification - Intact/Free of Obstruction] : Ground Wire - VISUAL Verification - Intact/Free of Obstruction  [Ground Continuity - Verified < 1 ohm w/ Wrist Strap Eagle] : Ground Continuity - Verified < 1 ohm w/ Wrist Strap Eagle [Number: ___] : Number: [unfilled] [Diagnosis: ___] : Diagnosis: [unfilled] [Clear all fields] : clear all fields [] : No [FreeTextEntry2] : MD Rx Verified [FreeTextEntry4] : 100 [FreeTextEntry6] : 12 : 24 [FreeTextEntry8] : 12 : 34 [de-identified] : 13 : 04 [de-identified] : 13 : 09 [de-identified] : 5651 [de-identified] : 5584 [de-identified] : 5752 [de-identified] : 6842 [de-identified] : 120mins

## 2022-06-08 NOTE — ASSESSMENT
[FreeTextEntry2] : Infection prevention \par Wound care (dressing changes)\par Maintain optimal skin integrity to high pressure areas\par Compression therapy\par Nutrition and wound healing\par Elevation and low sodium compliance.\par Offloading the stress on skin structures and decreasing potential pathologic biomechanical influences.\par HBOT.\par Homecare\par F/U daily for HBOT and in  for an assessment [FreeTextEntry4] : Patient has completed 37/50 HBOT\par Patient states that homecare was received yesterday(6/5/22) but states he has not yet been evaluated by PT, DPM aware\par Patient was re-educated on the importance of proper footwear to prevent falls. Patient was observed wearing slippers. He was re-educated that his slippers were not appropriate footwear. He was re-educated that he should be wearing sturdy sneakers. patient verbalized understanding and stated that he will not use them.\par COVID-19 swab obtained\par F/U daily for HBOT and in  for an assessment [FreeTextEntry1] : [facility]

## 2022-06-08 NOTE — PHYSICAL EXAM
[Abdominal Pad] : Abdominal Pad [4 x 4] : 4 x 4  [2 x 2] : 2 x 2  [Normal Thyroid] : the thyroid was normal [Normal Breath Sounds] : Normal breath sounds [Normal Heart Sounds] : normal heart sounds [Normal Rate and Rhythm] : normal rate and rhythm [Alert] : alert [Oriented to Person] : oriented to person [Oriented to Place] : oriented to place [Oriented to Time] : oriented to time [Calm] : calm [JVD] : no jugular venous distention  [Abdomen Masses] : No abdominal massess [Abdomen Tenderness] : ~T ~M No abdominal tenderness [Enlarged] : not enlarged [de-identified] : adult WM, NAD, alert, Ox3. [FreeTextEntry3] : 8.4 [FreeTextEntry2] : 13.5 [FreeTextEntry4] : 0.1-0.2 [de-identified] : 100% [FreeTextEntry5] : none [FreeTextEntry7] : Right Anterior Shoulder- RESOLVED [de-identified] : None [de-identified] : 100% [de-identified] : No treatment required [de-identified] : Right Foot 2nd Digit  [de-identified] : Mechanically cleansed with sterile gauze and normal saline 0.9%\par  [de-identified] : 0.5 [de-identified] : 0.4 [de-identified] : 0.2 [de-identified] : serosanguineous [de-identified] : See Dr. López's note [de-identified] : Mechanically cleansed with sterile gauze and normal saline 0.9%\par \par \par \par  [FreeTextEntry1] : Forehead Laceration - RESOLVED [de-identified] : None [de-identified] : none [de-identified] : No treatment required [de-identified] : Mechanically cleansed with sterile gauze and normal saline 0.9%\par  [TWNoteComboBox1] : Midline [TWNoteComboBox5] : No [de-identified] : No [de-identified] : 3x Weekly [de-identified] : Primary Dressing [de-identified] : Traumatic [de-identified] : No [de-identified] : Normal [de-identified] : None [de-identified] : No [de-identified] : Small [de-identified] : Other [de-identified] : No [de-identified] : Normal [de-identified] : None [de-identified] : 100% [de-identified] : 3x Weekly [TWNoteComboBox6] : Traumatic [de-identified] : No [de-identified] : Normal [de-identified] : 100% [de-identified] : None [de-identified] : No [de-identified] : Daily

## 2022-06-08 NOTE — ASSESSMENT
[Verbal] : Verbal [Written] : Written [Demo] : Demo [Patient] : Patient [Good - alert, interested, motivated] : Good - alert, interested, motivated [Demonstrates independently] : demonstrates independently [Dressing changes] : dressing changes [Foot Care] : foot care [Skin Care] : skin care [Signs and symptoms of infection] : sign and symptoms of infection [Nutrition] : nutrition [How and When to Call] : how and when to call [Hyperbaric Therapy] : hyperbaric therapy [Off-loading] : off-loading [Home Health] : home health [Patient responsibility to plan of care] : patient responsibility to plan of care [] : Yes [Stable] : stable [Home] : Home [Walker] : Walker [Faxed - Long Term Care/Home Health Agency] : Long Term Care/Home Health Agency: Faxed [FreeTextEntry2] : Infection prevention \par Wound care (dressing changes)\par Maintain optimal skin integrity to high pressure areas\par Compression therapy\par Nutrition and wound healing\par Elevation and low sodium compliance.\par Offloading the stress on skin structures and decreasing potential pathologic biomechanical influences.\par HBOT.\par Homecare\par F/U daily for HBOT and in  for an assessment [FreeTextEntry4] : Patient has completed 37/50 HBOT\par Patient states that homecare was received yesterday(6/5/22) but states he has not yet been evaluated by PT, DPM aware\par MD recommended patient follow up with plastic surgery. The patient was provided with the contact information for Dr. Villeda- [phone]\par COVID-19 swab obtained\par F/U daily for HBOT and in  for an assessment [FreeTextEntry1] : [facility]

## 2022-06-09 NOTE — ADDENDUM
[FreeTextEntry1] : PT ARRIVED A&OX3 WITH THE USE OF A WALKER\par ALL VITALS WITHIN PARAMETERS FOR HBOT\par SMALL DRAINAGE NOTED ON DRESSING\par PT REQUESTED OFF LOADING MEASURES VIA LEG WEDGE, PT EXPRESSED SATISFACTION AND COMFORT IN PLACEMENT OF WEDGE\par HEARING AIDS REMOVED BY PATIENT AND PLACED IN PATIENT BELONGINGS BAG PRE HBOT \par PT PRE-DIVE CHECKLIST SIGNED AND WITNESSED BY CHR\par PT DESCENDED TO 2.4 UMER @ 2.2 PSI/MIN IN CHAMBER #3 WITHOUT INCIDENT\par PT RESTING AT TX DEPTH WITH VISIBLE CHEST RISE AND FALL OBSERVED CHAMBER SIDE\par PT TOLERATED AIR BREAKS WELL\par PT ASCENDED FROM 2.4 UMER @ 2.2 PSI/MIN WITHOUT INCIDENT\par PT TOLERATED TX WELL\par PT RECEIVED DRESSING CHANGE POST HBOT

## 2022-06-09 NOTE — PROCEDURE
[Outpatient] : Outpatient [Ambulatory] : Patient is ambulatory. [Walker] : walker [THIS CHAMBER HAS BEEN CLEANED / DISINFECTED] : This chamber has been cleaned / disinfected according to local and hospital policy and procedure prior to this treatment. [Patient demonstrated and verbalized proper technique for using air break mask] : Patient demonstrated and verbalized proper technique for using air break mask [Patient educated on the risks of SMOKING prior to HBOT with understanding] : Patient educated on the risks of SMOKING prior to HBOT with understanding [Patient educated on the risks of CONSUMING ALCOHOL prior to HBOT with understanding] : Patient educated on the risks of CONSUMING ALCOHOL prior to HBOT with understanding [100% Cotton] : 100% cotton [Empty all pockets] : empty all pockets [No hair oils, wigs, hairpieces, pins] : no hair oils, wigs, hairpieces, pins  [Pre tx medications] : pre tx medications  [No make-up, creams] : no make-up, creams  [No jewelry] : no jewelry  [No matches, cigarettes, lighters] : no matches, cigarettes, lighters  [Hearing aid removed] : hearing aid removed [Dentures removed] : dentures removed [Ground bracelet on pt's wrist] : ground bracelet on pt's wrist  [Contacts removed] : contacts removed  [Remove nail polish] : remove nail polish  [No reading material] : no reading material  [Bra, undergarments removed] : bra, undergarments removed  [No contraindicated dressings] : no contraindicated dressings [Ground Wire - VISUAL Verification - Intact/Free of Obstruction] : Ground Wire - VISUAL Verification - Intact/Free of Obstruction  [Ground Continuity - Verified < 1 ohm w/ Wrist Strap Eagle] : Ground Continuity - Verified < 1 ohm w/ Wrist Strap Eagle [Number: ___] : Number: [unfilled] [Diagnosis: ___] : Diagnosis: [unfilled] [____] : Post-Dive: Time - [unfilled] [___] : Post-Dive: Value - [unfilled] mg/dL [Clear all fields] : clear all fields [] : No [FreeTextEntry2] : MD RX VERIFIED  [FreeTextEntry4] : 100 [FreeTextEntry6] : 4968 [FreeTextEntry8] : 6214 [de-identified] : 8284 [de-identified] : 1358 [de-identified] : 6082 [de-identified] : 8665 [de-identified] : 5725 [de-identified] : 5833 [de-identified] : 120 MINUTES

## 2022-06-09 NOTE — PROCEDURE
[Outpatient] : Outpatient [Ambulatory] : Patient is ambulatory. [Walker] : walker [THIS CHAMBER HAS BEEN CLEANED / DISINFECTED] : This chamber has been cleaned / disinfected according to local and hospital policy and procedure prior to this treatment. [Patient demonstrated and verbalized proper technique for using air break mask] : Patient demonstrated and verbalized proper technique for using air break mask [Patient educated on the risks of SMOKING prior to HBOT with understanding] : Patient educated on the risks of SMOKING prior to HBOT with understanding [Patient educated on the risks of CONSUMING ALCOHOL prior to HBOT with understanding] : Patient educated on the risks of CONSUMING ALCOHOL prior to HBOT with understanding [100% Cotton] : 100% cotton [Empty all pockets] : empty all pockets [No hair oils, wigs, hairpieces, pins] : no hair oils, wigs, hairpieces, pins  [Pre tx medications] : pre tx medications  [No make-up, creams] : no make-up, creams  [No jewelry] : no jewelry  [No matches, cigarettes, lighters] : no matches, cigarettes, lighters  [Hearing aid removed] : hearing aid removed [Dentures removed] : dentures removed [Ground bracelet on pt's wrist] : ground bracelet on pt's wrist  [Contacts removed] : contacts removed  [Remove nail polish] : remove nail polish  [No reading material] : no reading material  [Bra, undergarments removed] : bra, undergarments removed  [No contraindicated dressings] : no contraindicated dressings [Ground Wire - VISUAL Verification - Intact/Free of Obstruction] : Ground Wire - VISUAL Verification - Intact/Free of Obstruction  [Ground Continuity - Verified < 1 ohm w/ Wrist Strap Eagle] : Ground Continuity - Verified < 1 ohm w/ Wrist Strap Eagle [Number: ___] : Number: [unfilled] [Diagnosis: ___] : Diagnosis: [unfilled] [____] : Post-Dive: Time - [unfilled] [___] : Post-Dive: Value - [unfilled] mg/dL [Clear all fields] : clear all fields [] : No [FreeTextEntry2] : MD RX VERIFIED  [FreeTextEntry4] : 100 [FreeTextEntry6] : 6863 [FreeTextEntry8] : 0323 [de-identified] : 3355 [de-identified] : 7105 [de-identified] : 0685 [de-identified] : 2634 [de-identified] : 7454 [de-identified] : 1703 [de-identified] : 120 MINUTES

## 2022-06-09 NOTE — ADDENDUM
[FreeTextEntry1] : PT ARRIVED A&OX3 WITH THE USE OF A WALKER\par ALL VITALS WITHIN PARAMETERS FOR HBOT\par PT HAD A CONTRAINDICATED BAND-AID ON RIGHT KNEE FROM INJECTION SIGHT\par BANDAGE WAS REMOVED\par NO DRAINAGE NOTED ON DRESSING PRE HBOT\par PT HEARING AIDS TAKEN OUT BY PATIENT AND IN PATIENT BELONGINGS BAG\par PT REQUESTED OFFLOADING MEASURES VIA LEG WEDGE, PT EXPRESSES SATISFACTION AND COMFORT IN PLACEMENT OF LEG WEDGE\par PT PRE-DIVE CHECKLIST SIGNED AND WITNESSED BY \par PT DESCENDED TO 2.4 UMER @ 2.2 PSI/MIN IN CHAMBER #4 WITHOUT INCIDENT\par PT RESTING AT TX DEPTH WITH VISIBLE CHEST RISE AND FALL OBSERVED CHAMBER SIDE\par PT TOLERATED AIR BREAKS WELL\par PT ASCENDED FROM 2.4 UMER @ 2.2 PSI/MIN WITHOUT INCIDENT\par PT TOLERATED TX WELL\par  98.8

## 2022-06-10 NOTE — PROCEDURE
[Outpatient] : Outpatient [Walker] : walker [THIS CHAMBER HAS BEEN CLEANED / DISINFECTED] : This chamber has been cleaned / disinfected according to local and hospital policy and procedure prior to this treatment. [____] : Pre-Dive: Time - [unfilled] [___] : Pre-Dive: Value - [unfilled] mg/dL [Patient demonstrated and verbalized proper technique for using air break mask] : Patient demonstrated and verbalized proper technique for using air break mask [Patient educated on the risks of SMOKING prior to HBOT with understanding] : Patient educated on the risks of SMOKING prior to HBOT with understanding [Patient educated on the risks of CONSUMING ALCOHOL prior to HBOT with understanding] : Patient educated on the risks of CONSUMING ALCOHOL prior to HBOT with understanding [100% Cotton] : 100% cotton [Empty all pockets] : empty all pockets [No hair oils, wigs, hairpieces, pins] : no hair oils, wigs, hairpieces, pins  [Pre tx medications] : pre tx medications  [No make-up, creams] : no make-up, creams  [No jewelry] : no jewelry  [No matches, cigarettes, lighters] : no matches, cigarettes, lighters  [Hearing aid removed] : hearing aid removed [Dentures removed] : dentures removed [Ground bracelet on pt's wrist] : ground bracelet on pt's wrist  [Contacts removed] : contacts removed  [Remove nail polish] : remove nail polish  [No reading material] : no reading material  [Bra, undergarments removed] : bra, undergarments removed  [No contraindicated dressings] : no contraindicated dressings [Ground Wire - VISUAL Verification - Intact/Free of Obstruction] : Ground Wire - VISUAL Verification - Intact/Free of Obstruction  [Ground Continuity - Verified < 1 ohm w/ Wrist Strap Eagle] : Ground Continuity - Verified < 1 ohm w/ Wrist Strap Eagle [Number: ___] : Number: [unfilled] [Diagnosis: ___] : Diagnosis: [unfilled] [Clear all fields] : clear all fields [] : No [FreeTextEntry4] : 100 minutes [FreeTextEntry6] : 7517 [FreeTextEntry8] : 7572 [de-identified] : 6902 [de-identified] : 8723 [de-identified] : 7879 [de-identified] : 2170 [de-identified] : 3599 [de-identified] : 1509 [de-identified] : 120 minutes

## 2022-06-10 NOTE — ADDENDUM
[FreeTextEntry1] : pt vitals were within normal parameters to begin hbot \par pt primary check completed by CHT and negative \par pt secondary check completed and negative \par pt descended to tx depth 2.4 keturah @2.2 psi/min without incident in chamber #4 \par pt's resting at tx depth with visible chest rise and fall as observed chamber side \par pt tolerated both intermediate air breaks well\par pt ascended from tx depth without incident in chamber #4 \par pt tolerated tx well\par pt received wc post hbot without incident \par pt hearing aids given directly to patient post hbot \par pt placed hearing aids in ears\par

## 2022-06-14 NOTE — PROCEDURE
[Outpatient] : Outpatient [Ambulatory] : Patient is ambulatory. [Walker] : walker [THIS CHAMBER HAS BEEN CLEANED / DISINFECTED] : This chamber has been cleaned / disinfected according to local and hospital policy and procedure prior to this treatment. [Patient demonstrated and verbalized proper technique for using air break mask] : Patient demonstrated and verbalized proper technique for using air break mask [Patient educated on the risks of SMOKING prior to HBOT with understanding] : Patient educated on the risks of SMOKING prior to HBOT with understanding [Patient educated on the risks of CONSUMING ALCOHOL prior to HBOT with understanding] : Patient educated on the risks of CONSUMING ALCOHOL prior to HBOT with understanding [100% Cotton] : 100% cotton [Empty all pockets] : empty all pockets [No hair oils, wigs, hairpieces, pins] : no hair oils, wigs, hairpieces, pins  [Pre tx medications] : pre tx medications  [No make-up, creams] : no make-up, creams  [No jewelry] : no jewelry  [No matches, cigarettes, lighters] : no matches, cigarettes, lighters  [Hearing aid removed] : hearing aid removed [Dentures removed] : dentures removed [Ground bracelet on pt's wrist] : ground bracelet on pt's wrist  [Contacts removed] : contacts removed  [Remove nail polish] : remove nail polish  [No reading material] : no reading material  [Bra, undergarments removed] : bra, undergarments removed  [No contraindicated dressings] : no contraindicated dressings [Ground Wire - VISUAL Verification - Intact/Free of Obstruction] : Ground Wire - VISUAL Verification - Intact/Free of Obstruction  [Ground Continuity - Verified < 1 ohm w/ Wrist Strap Eagle] : Ground Continuity - Verified < 1 ohm w/ Wrist Strap Eagle [Number: ___] : Number: [unfilled] [Diagnosis: ___] : Diagnosis: [unfilled] [____] : Post-Dive: Time - [unfilled] [___] : Post-Dive: Value - [unfilled] mg/dL [Clear all fields] : clear all fields [] : No [FreeTextEntry4] : 100 [FreeTextEntry8] : 1601 [FreeTextEntry6] : 6652 [de-identified] : 7796 [de-identified] : 5816 [de-identified] : 7135 [de-identified] : 5524 [de-identified] : 9127 [de-identified] : 8204 [de-identified] : 120 MINUTES

## 2022-06-14 NOTE — ADDENDUM
[FreeTextEntry1] : PT ARRIVED A&OX3 WITH THE USE OF A WALKER\par ALL VITALS WITHIN PARAMETERS FOR HBOT\par PT HAD CONTRAINDICATED DRESSING ON ABDOMEN PRE HBOT, DRESSING CHANGED\par PT REQUESTED OFFLOADING MEASURES VIA LEG WEDGE, PT EXPRESSED SATISFACTION AND COMFORT IN PLACEMENT OF WEDGE\par PT PRE-DIVE CHECKLIST SIGNED AND WITNESSED BY CHT\par PT DESCENDED TO 2.4 UMER @ 2.2 PSI/MIN IN CHAMBER #3 WITHOUT INCIDENT\par PT RESTING AT TX DEPTH WITH VISIBLE CHEST RISE AND FALL OBSERVED CHAMBER SIDE\par PT TOLERATED AIR BREAKS WELL\par PT ASCENDED FROM 2.4 UMER @ 2.2 PSI/MIN WITHOUT INCIDENT\par PT TOLERATED TX WELL\par

## 2022-06-15 NOTE — DISEASE MANAGEMENT
[TTNM] : 3 [NTNM] : 0 [MTNM] : 0 [de-identified] : 8071 [de-identified] : 5826 [de-identified] : pancreas

## 2022-06-15 NOTE — ADDENDUM
[FreeTextEntry1] : I was present with the Resident during the key/critical portions of the visit. I discussed the case with the Resident and agree with the findings and plan as documented in the Resident 's note, unless noted below.\par Patient recovering well from treatment.\par [facility]\par

## 2022-06-15 NOTE — HISTORY OF PRESENT ILLNESS
[FreeTextEntry1] :  79yo M w/ multiple extensive list of comorbidities, previous radiation for testicular cancer aprox 30 years ago and pancreatic cancer s/p Grant/ Abraxane at outside institution and s/p Whipple surgery w/ Dr. Mercer on 10/26/2021. Pathology showing ypT3N0, and positive margins at vascular margin and pancreatic resection margin. He is now s/p 4000 cGy pancreatic SBRT in 5fx from 1/11/22 - 1/19/22. \par \par Today he reports feeling well. Reports mild nausea and mild decrease in appetite since starting treatment, but notes that this is improving at present. Also notes mild fatigue. Denies vomiting, denies diarrhea, or constipation. In regards to abdominal pain, denies spontaneous abdominal pain but notes that with pressure e.g. from applying belt for treatment, or upon palpation of abdomen, he experiences pain. Denies any other acute concerns. His pacemaker was interrogated given treatment now completed.\par \par CT A/P 3/7/22:\par No local tumor recurrence\par New indeterminate 1.1 cm RUL ground-glass nodule, possibly infectious/inflammatory. 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 Fat containing R inguinal hernia again noted. \par Serpiginous scleroses in the R femoral head, question AVN\par \par \par 5/2/22 - Patient presents today for post treatment evaluation. Completed 4000 cGy in 5 fx to Pancreas on 1/19/22. \par

## 2022-06-16 NOTE — ADDENDUM
[FreeTextEntry1] : Patients Order Verified Prior to Treatment. \par Patients Secondary Contraindication Examination Check Preformed by CHT. \par Patients Contraindication List and Pre-Check List, Verified and Signed by Technician and CHT Prior to Patients Treatment. \par Patients Legs Elevated via Double Leg Wedge to Offload Patients Wound.\par Patient descended to 2.4 UMER @ 2.2 PSI/MIN without incident in chamber #1.\par Patient resting comfortably @ depth, equal chest rise observed throughout treatment.\par pt tolerated both intermediate air breaks well\par pt ascended from tx depth without incident in chamber #1 \par pt tolerated tx well\par pt assisted to exam room for dressing change post hbot without incident

## 2022-06-16 NOTE — PROCEDURE
[Outpatient] : Outpatient [Ambulatory] : Patient is ambulatory. [Walker] : walker [THIS CHAMBER HAS BEEN CLEANED / DISINFECTED] : This chamber has been cleaned / disinfected according to local and hospital policy and procedure prior to this treatment. [____] : Pre-Dive: Time - [unfilled] [___] : Pre-Dive: Value - [unfilled] mg/dL [Patient demonstrated and verbalized proper technique for using air break mask] : Patient demonstrated and verbalized proper technique for using air break mask [Patient educated on the risks of SMOKING prior to HBOT with understanding] : Patient educated on the risks of SMOKING prior to HBOT with understanding [Patient educated on the risks of CONSUMING ALCOHOL prior to HBOT with understanding] : Patient educated on the risks of CONSUMING ALCOHOL prior to HBOT with understanding [100% Cotton] : 100% cotton [Empty all pockets] : empty all pockets [No hair oils, wigs, hairpieces, pins] : no hair oils, wigs, hairpieces, pins  [Pre tx medications] : pre tx medications  [No make-up, creams] : no make-up, creams  [No jewelry] : no jewelry  [No matches, cigarettes, lighters] : no matches, cigarettes, lighters  [Hearing aid removed] : hearing aid removed [Dentures removed] : dentures removed [Ground bracelet on pt's wrist] : ground bracelet on pt's wrist  [Contacts removed] : contacts removed  [Remove nail polish] : remove nail polish  [No reading material] : no reading material  [Bra, undergarments removed] : bra, undergarments removed  [No contraindicated dressings] : no contraindicated dressings [Ground Wire - VISUAL Verification - Intact/Free of Obstruction] : Ground Wire - VISUAL Verification - Intact/Free of Obstruction  [Ground Continuity - Verified < 1 ohm w/ Wrist Strap Eagle] : Ground Continuity - Verified < 1 ohm w/ Wrist Strap Eagle [Number: ___] : Number: [unfilled] [Diagnosis: ___] : Diagnosis: [unfilled] [Clear all fields] : clear all fields [] : No [FreeTextEntry4] : 100 minutes [FreeTextEntry6] : 7788 [[facility]] : 9789 [de-identified] : 5745 [de-identified] : 7587 [de-identified] : 3633 [de-identified] : 5550 [de-identified] : 5545 [de-identified] : 7539 [de-identified] : 120 minutes

## 2022-06-17 NOTE — ADDENDUM
[FreeTextEntry1] : PTS PRE PROCEDURAL CHECKLIST WAS DONE BY CHT/RAPHAEL AND HT SUCCESSFULLY PRE HBOT\par PT DESCENDED TO 2.4 UMER @ 2.2 PSI/MIN WITHOUT INCIDENT IN CHAMBER #3\par PT RESTING AT TX DEPTH WITH VISIBLE CHEST RISE AND FALL OBSERVED CHAMBERSIDE \par PT TOLERATED BOTH AIR BREAKS WELL \par PT ASCENDED FROM TX DEPTH WITHOUT INCIDENT IN CHAMBER #3\par PT RECEIVED WOUND CARE POST HBOT \par PT TOLERATED TX WELL \par

## 2022-06-17 NOTE — PROCEDURE
[Outpatient] : Outpatient [Ambulatory] : Patient is ambulatory. [Walker] : walker [THIS CHAMBER HAS BEEN CLEANED / DISINFECTED] : This chamber has been cleaned / disinfected according to local and hospital policy and procedure prior to this treatment. [Patient demonstrated and verbalized proper technique for using air break mask] : Patient demonstrated and verbalized proper technique for using air break mask [Patient educated on the risks of SMOKING prior to HBOT with understanding] : Patient educated on the risks of SMOKING prior to HBOT with understanding [Patient educated on the risks of CONSUMING ALCOHOL prior to HBOT with understanding] : Patient educated on the risks of CONSUMING ALCOHOL prior to HBOT with understanding [100% Cotton] : 100% cotton [Empty all pockets] : empty all pockets [No hair oils, wigs, hairpieces, pins] : no hair oils, wigs, hairpieces, pins  [Pre tx medications] : pre tx medications  [No make-up, creams] : no make-up, creams  [No jewelry] : no jewelry  [No matches, cigarettes, lighters] : no matches, cigarettes, lighters  [Hearing aid removed] : hearing aid removed [Dentures removed] : dentures removed [Ground bracelet on pt's wrist] : ground bracelet on pt's wrist  [Contacts removed] : contacts removed  [Remove nail polish] : remove nail polish  [No reading material] : no reading material  [Bra, undergarments removed] : bra, undergarments removed  [No contraindicated dressings] : no contraindicated dressings [Ground Wire - VISUAL Verification - Intact/Free of Obstruction] : Ground Wire - VISUAL Verification - Intact/Free of Obstruction  [Ground Continuity - Verified < 1 ohm w/ Wrist Strap Eagle] : Ground Continuity - Verified < 1 ohm w/ Wrist Strap Eagle [Number: ___] : Number: [unfilled] [Diagnosis: ___] : Diagnosis: [unfilled] [____] : Post-Dive: Time - [unfilled] [___] : Post-Dive: Value - [unfilled] mg/dL [Clear all fields] : clear all fields [] : No [FreeTextEntry4] : 100 Min [FreeTextEntry6] : 7022 [FreeTextEntry8] : 4078 [de-identified] : 8729 [de-identified] : 3015 [de-identified] : 6173 [de-identified] : 4834 [de-identified] : 5831 [de-identified] : 1896 [de-identified] : 120 Min

## 2022-06-17 NOTE — ADDENDUM
[FreeTextEntry1] : pt arrived axox3 assisted with walker chair\par pt seen removing hearing aids by self and placing in container prior to tx\par pt seen removing abdominal girdle by self and storing prior to tx\par pt undergarments removed prior to tx\par pt given leg wedge for comfort, pt expressed comfort upon placement under legs\par all pt vitals within normal parameters for hbot\par secondary check performed and verified by cht and negative\par pt descended to tx depth of 2.4ATA @2.2psi/min in chamber 3 without incident\par pt seen resting at depth with visible chest rise and fall observed chamberside\par pt seen tolerating airbreaks without incident\par pt received covid PCR test post tx\par pt received wound care post tx

## 2022-06-17 NOTE — PROCEDURE
[Outpatient] : Outpatient [Ambulatory] : Patient is ambulatory. [Walker] : walker [THIS CHAMBER HAS BEEN CLEANED / DISINFECTED] : This chamber has been cleaned / disinfected according to local and hospital policy and procedure prior to this treatment. [Patient demonstrated and verbalized proper technique for using air break mask] : Patient demonstrated and verbalized proper technique for using air break mask [Patient educated on the risks of SMOKING prior to HBOT with understanding] : Patient educated on the risks of SMOKING prior to HBOT with understanding [Patient educated on the risks of CONSUMING ALCOHOL prior to HBOT with understanding] : Patient educated on the risks of CONSUMING ALCOHOL prior to HBOT with understanding [100% Cotton] : 100% cotton [Empty all pockets] : empty all pockets [No hair oils, wigs, hairpieces, pins] : no hair oils, wigs, hairpieces, pins  [Pre tx medications] : pre tx medications  [No make-up, creams] : no make-up, creams  [No jewelry] : no jewelry  [No matches, cigarettes, lighters] : no matches, cigarettes, lighters  [Hearing aid removed] : hearing aid removed [Dentures removed] : dentures removed [Ground bracelet on pt's wrist] : ground bracelet on pt's wrist  [Contacts removed] : contacts removed  [Remove nail polish] : remove nail polish  [No reading material] : no reading material  [Bra, undergarments removed] : bra, undergarments removed  [No contraindicated dressings] : no contraindicated dressings [Ground Wire - VISUAL Verification - Intact/Free of Obstruction] : Ground Wire - VISUAL Verification - Intact/Free of Obstruction  [Ground Continuity - Verified < 1 ohm w/ Wrist Strap Eagle] : Ground Continuity - Verified < 1 ohm w/ Wrist Strap Eagle [Number: ___] : Number: [unfilled] [Diagnosis: ___] : Diagnosis: [unfilled] [____] : Post-Dive: Time - [unfilled] [___] : Post-Dive: Value - [unfilled] mg/dL [Clear all fields] : clear all fields [] : No [FreeTextEntry4] : 100 [FreeTextEnxud6] : 7276 [FreeTextEntry8] : 6909 [de-identified] : 8202 [de-identified] : 0210 [de-identified] : 8310 [de-identified] : 2167 [de-identified] : 9556 [de-identified] : 5915 [de-identified] : 120 MINUTES

## 2022-06-20 PROBLEM — Z85.07: Status: ACTIVE | Noted: 2022-01-01

## 2022-06-20 PROBLEM — L59.8 RADIONECROSIS: Status: ACTIVE | Noted: 2022-01-01

## 2022-06-20 NOTE — PLAN
[FreeTextEntry1] : shweta, alginate, DD, abd pad abdomen QOD\par continue HBO tx\par recommended he see original surgeon to evaluation area of protrusion which has enlarged\par f/u 2 wk\par \par time spent 25 mins.

## 2022-06-20 NOTE — HISTORY OF PRESENT ILLNESS
[FreeTextEntry1] : 80 yo WM, here for his weekly assessment. 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 Pt with a h/o pancreatic CA. The wound continues to have mod drainage. No change in size since last assessment. Discussed possible option of skin grafting via plastic surgery.\par \par 5/9/22 abdominal wound smaller\par 6/20/22 area of abdominal protrusion has significantly increased patient denies strenuous lifting or activity. \par stressed need to see original operating physician.

## 2022-06-20 NOTE — ASSESSMENT
[Verbal] : Verbal [Patient] : Patient [Good - alert, interested, motivated] : Good - alert, interested, motivated [Verbalizes knowledge/Understanding] : Verbalizes knowledge/understanding [Dressing changes] : dressing changes [Skin Care] : skin care [Signs and symptoms of infection] : sign and symptoms of infection [Nutrition] : nutrition [Hyperbaric Therapy] : hyperbaric therapy [Home Health] : home health [Patient responsibility to plan of care] : patient responsibility to plan of care [Stable] : stable [Home] : Home [Walker] : Walker [Faxed - Long Term Care/Home Health Agency] : Long Term Care/Home Health Agency: Faxed [] : No [FreeTextEntry2] : Promote optimal skin integrity, infection prevention\par  [FreeTextEntry3] : Wound increased in size and increased distention [FreeTextEntry4] : \par Patient completed 45 / 50 HBO txs to date.\par 10 additional txs ordered - submitted for authorization\par COVID PCR swab to bilateral nares/pharyngeal done\par MD recommended that patient follow up with Dr. Mercer, Oncology Surgeon, due to Wound increased in size and increased distention.  Patient verbalized understanding\par Patient reports that he has a doctor appointment tomorrow at [facility]  and will return for HBO tx on Wednesday\par  [FreeTextEntry1] : [facility]

## 2022-06-20 NOTE — VITALS
[] : No [de-identified] : Pain scale:  0/10 - Patient reports no c/o pains or discomforts at present.

## 2022-06-20 NOTE — PHYSICAL EXAM
[Normal Thyroid] : the thyroid was normal [Normal Breath Sounds] : Normal breath sounds [Normal Heart Sounds] : normal heart sounds [Normal Rate and Rhythm] : normal rate and rhythm [Alert] : alert [Oriented to Person] : oriented to person [Oriented to Place] : oriented to place [Oriented to Time] : oriented to time [Calm] : calm [4 x 4] : 4 x 4  [Abdominal Pad] : Abdominal Pad [JVD] : no jugular venous distention  [Abdomen Masses] : No abdominal massess [Abdomen Tenderness] : ~T ~M No abdominal tenderness [Enlarged] : not enlarged [de-identified] : adult WM, NAD, alert, Ox3. [FreeTextEntry1] : Midline abdomen - wound distended (see photo) [FreeTextEntry2] : 15.5 [FreeTextEntry3] : 12.5 [FreeTextEntry4] : 0.2 [de-identified] : large serosanguineous [de-identified] : intact [de-identified] : none [de-identified] : 100% [de-identified] : Marlee, calcium alginate [de-identified] : Mechanically cleansed with normal saline solution and sterile gauze,\par \par Tegaderm [FreeTextEntry7] : Right foot 2nd digit - closed [de-identified] : no treatment [de-identified] : Every other day

## 2022-06-28 PROBLEM — E86.0 DEHYDRATION: Status: ACTIVE | Noted: 2022-01-01

## 2022-06-28 NOTE — REVIEW OF SYSTEMS
[Memory Loss] : memory loss [Insomnia] : insomnia [Anxiety] : anxiety [Depression] : depression [Negative] : Heme/Lymph [Suicidal] : not suicidal [de-identified] : forgetfulnness, see HPI [de-identified] : difficulty sleeping, see HPI

## 2022-06-28 NOTE — HEALTH RISK ASSESSMENT
[Intercurrent hospitalizations] : was admitted to the hospital  [1] : 2) Feeling down, depressed, or hopeless for several days (1) [PHQ-2 Positive] : PHQ-2 Positive [Several Days (1)] : 2.) Feeling down, depressed or hopeless? Several days [1/2 of Days or More (2)] : 4.) Feeling tired or having little energy? Half the days or more [Nearly Every Day (3)] : 7.) Trouble concentrating on things, such as reading a newspaper or watching television? Nearly every day [Not at All (0)] : 8.) Moving or speaking so slowly that other people could have noticed, or the opposite, moving or speaking faster than usual? Not at all [Moderately Severe] : severity of depression is moderately severe [Very Difficult] : How difficult have these problems made it for you to do your work, take care of things at home, or get along with people? Very difficult [PHQ-9 Positive] : PHQ-9 Positive [[id number]] : 17

## 2022-06-28 NOTE — HISTORY OF PRESENT ILLNESS
[Post-hospitalization from ___ Hospital] : Post-hospitalization from [unfilled] Hospital [Admitted on: ___] : The patient was admitted on [unfilled] [Discharge Summary] : discharge summary [Pertinent Labs] : pertinent labs [Discharge Med List] : discharge medication list [Med Reconciliation] : medication reconciliation has been completed [Patient Contacted By: ____] : and contacted by [unfilled] [FreeTextEntry1] : follow up  [de-identified] : 06/28/22: \par reports previous constipation that occurred several weeks ago . Reports 2 episodes where he notice a small amount of blood in stool after straining 2 months ago, has not noticed any blood in stool since then. denies hematochezia, hematuria, abdominal pain, dysuria.  \par -reports mild forgetfulness that has increased in the past few months. \par - pt reports trouble falling asleep, reports 4-6 hours of sleep daily. \par - has been feeling depressed due to hernia repair wound not healing, surgery was done 10/2021, pt states he has a consult with a plastic surgery regarding wound closure.  \par -reports he has lost around 20lbs since his surgery 10/2021, has been feeling depressed due delayed healing of his wound. \par  [FreeTextEntry2] : 1. foot ulcer- went home with home care - wound care and HHA still coming for home visits. \par cardiologist- Dr Gu\par 2. concerned about blood sugars\par 3. fatigue/ history of anemia/ not sleeping\par 4. frequent stools, on creon, \par 5. insomnia\par 6. [place]\par 5. anxiety\par

## 2022-06-28 NOTE — PLAN
[FreeTextEntry1] : 1. foot ulcer- went home with home care - wound care and HHA still coming for home visits. \par cardiologist- Dr Gu\par 2. concerned about blood sugars: will check a1c, insulin levels, s/p whipple\par 3. fatigue/ history of anemia/ not sleeping: check cbc, iron\par 4. frequent stools, on creon: f/u with Gi\par 5. insomnia: Melatonin and restart trazodone. Take Cymbalta at night\par 6. [place]: f/u with ENT

## 2022-07-05 PROBLEM — C25.9 PANCREATIC CANCER: Status: ACTIVE | Noted: 2022-01-01

## 2022-07-05 NOTE — HISTORY OF PRESENT ILLNESS
[de-identified] : 79 y/o man with resected pancreatic cancer, margin positive disease, who had completed 5-months of D1/D8/D15 gemcitabine+nab-paclitaxel neoadjuvant therapy; and PMHx of extensive cardiovascular disease & osteomyelitis, presenting to medical oncology.\par \par Chronological Cancer History:\par 03/02/21 CT AP for abdominal pain & jaundice showed CBD dilitation at head of pancreas\par 03/05/21 EUS showed pancreatic mass\par 03/12/21 CT CAP at [facility] -> staged as resectable disease, planned for Whipple but not cleared from cardiac standpoint\par 03/24/21 underwent TAVR\par 04/15/21 staging laparoscopy saged as borderline resectable\par <when> C1 gemcitabine+nab-paclitaxel (Dr Carrasco)\par 09/07/21 C5 gemcitabine+nab-paclitaxel, courses c/b neutropenia requiring growth factor\par 09/26/21 admission for right foot big toe osteomyelitis s/p amputation\par 10/26/21 s/p resection (Whipple; [facility]), ypT3 N0, positive margin; pMMR; discharged to rehab facility\par 01/11/22 - 01/19/22 underwent 40Gy pancreatic SBRT\par 06/23/22 CT CAP KRIS\par \par Family cancer hx:  Father- prostate ;mother ovarian?\par  [de-identified] : 7/5/22\par - he feels fairly well but has been having a difficult time with his abdominal wall wound\par - otherwise weight stable\par - bm regular

## 2022-07-05 NOTE — ASSESSMENT
[FreeTextEntry1] : 79 y/o man with resected pancreatic cancer, margin positive disease, who had completed 5-months of D1/D8/D15 gemcitabine+nab-paclitaxel neoadjuvant therapy; and PMHx of extensive cardiovascular disease, presenting to medical oncology.\par \par He completed 5 months of chemotherapy in the adjuvant setting. The positive margin confers a high risk of local recurrence so should be evaluated by radiation oncology for consideration of RT to the resection bed. He has not yet had a post-op baseline scan, which should be done, to review for local and distant disease. If he is found to have measurable metastatic disease, I would consider starting chemotherapy. However, at this point I will refrain from continuing his neoadjuvant regimen to complete a 6th cycle of adjuvant since his performance status is in the ECOG 2-3 range, the pathologic response was poor, and he did not tolerate chemotherapy that well. Otherwise, I will plan to see him for surveillance and start chemotherapy if he has radiologic evidence of disease.\par \par He tolerated SBRT well, now KRIS by imaging.\par \par Plan:\par - continue signatera monitoring (needs this drawn, will come in for labs)\par - continue CT CAP w contrast every 3 months for first two years\par - if develops MRD+ by  or Signatera then would consider AMPLIFY trial\par \par David Juan MD PhD\par Medical Oncology Attending\par I personally have spent a total of 30 minutes of time on the date of this encounter reviewing test results, documenting findings, coordinating care, and directly consulting with the patient and/or designated family member.\par

## 2022-07-06 NOTE — H&P PST ADULT - PROBLEM SELECTOR PLAN 2
Patient with PMH of Pulmonary embolism- as per cardiologist instructions to stop Xarelto on 3 days prior to surgery- 07/04/2022.  Patient took last dose of Xarelto on 07/05/2022.  Will email surgeon.

## 2022-07-06 NOTE — H&P PST ADULT - PROBLEM SELECTOR PLAN 1
Patient is tentatively scheduled for Split thickness skin graft, placement of wound .VAC with Dr Gallegos  on07/08/2022.    Patient can takes own pantoprazole on am DOS.  Pre op instructions given and patient verbalized understanding.    Informed patient to do RT PCR at nearby [facility] urgent care tomorrow. Location address provided.   STAT RT PCR ordered.

## 2022-07-06 NOTE — H&P PST ADULT - HEALTH CARE MAINTENANCE
pt is poor historian, follows up with PCP and cardiologist, all informations obtained from [facility] pt is poor historian, follows up with PCP and cardiologist, all informations obtained from [facility].  Patient didn't bring covid vaccine information- will bring it on DOS  [employer]- [employer]- MODEL [id number] Serial [id number]- implanted- 04/10/2021

## 2022-07-06 NOTE — H&P PST ADULT - SKIN/BREAST COMMENTS
patient s/p whi[pple surgery- non healing wound on abdomen patient s/p whipple surgery- non healing wound on abdomen

## 2022-07-06 NOTE — H&P PST ADULT - NSICDXPASTMEDICALHX_GEN_ALL_CORE_FT
PAST MEDICAL HISTORY:  Acute osteomyelitis     Cardiomyopathy     GERD (gastroesophageal reflux disease)     HLD (hyperlipidemia)     [place] (hard of hearing) B/L hearing aids    HTN (hypertension)     MSSA bacteremia 9/2021    Pancreas cancer chemo    Pulmonary embolism

## 2022-07-06 NOTE — H&P PST ADULT - ASSESSMENT
pre op diagnosis of non healing abdominal wound, for pre surgical evaluation prior to scheduled surgery- Split thickness skin graft, placement of wound .VAC with Dr Gallegos

## 2022-07-06 NOTE — H&P PST ADULT - PROBLEM SELECTOR PLAN 3
Patient instructed to take metoprolol, Entresto, Amiodarone with a sip of water on the morning of procedure.    Copy of AICD card in chart.    Patient is poor historian- with multiple cardiac history- VTach- s/p AICD, CHF, TAVR, - last cardiology note obtained in chart.

## 2022-07-06 NOTE — H&P PST ADULT - CARDIOVASCULAR COMMENTS
patient usually does minimal activities- no dyspnea as per patient patient with CHF, HTN, HLD, TAVR,AICD

## 2022-07-06 NOTE — H&P PST ADULT - NSICDXPASTSURGICALHX_GEN_ALL_CORE_FT
PAST SURGICAL HISTORY:  Benign testicular tumor radiation    H/O Whipple procedure 2/2021    History of laminectomy X3    History of total hip replacement left X 1, 2 revisions    ICD (implantable cardiac defibrillator) in place implanted 2005, replaced 10/4/2021 [employer] Subcutaneous ICD    S/P TAVR (transcatheter aortic valve replacement) 7/2021    Status post amputation of toe of right foot 8/2021    Status post fracture of femur 2017 left with hardware

## 2022-07-06 NOTE — H&P PST ADULT - LAST CARDIAC ANGIOGRAM/IMAGING
2015 - s/p stentsx2 , 2021 s/p cath at [facility] - non obstructive CAD

## 2022-07-06 NOTE — H&P PST ADULT - NEGATIVE MUSCULOSKELETAL SYMPTOMS
no arthralgia/no arthritis/no joint swelling/no myalgia/no muscle cramps/no muscle weakness/no stiffness/no arm pain L/no arm pain R/no leg pain L

## 2022-07-06 NOTE — H&P PST ADULT - HISTORY OF PRESENT ILLNESS
81 year old male, presents to [facility] with pre op diagnosis of non healing abdominal wound, for pre surgical evaluation prior to scheduled surgery- Split thickness skin graft, placement of wound .VAC with Dr Gallegos      *** Of note- [facility] pancreatic cancer (dx 3/2021, chemo) s/p ERCP s/p whipple 3/2021, HTN, HLD, CHF (EF: 35%), CAD s/p stents x2 (~15 years ago), AICD (implanted 2005, extracted and re-implantation 9/2021), TAVR (4/2021), bilateral PE (7/2021) on Xarelto, spinal stenosis, macular degeneration, GERD, BPH, Left THR (x2 revisions), left femur fracture (hardware), Osteomyelitis right foot s/p right hallux amputation 9/2021, MSSA bacteremia, explantation and reimplantation of AICD **Covid swab on 10/23/21 at [facility]     81 year old male, presents to PST with pre op diagnosis of non healing abdominal wound, for pre surgical evaluation prior to scheduled surgery- Split thickness skin graft, placement of wound .VAC with Dr Gallegos      *** Of note- PMH pancreatic cancer (dx 3/2021, chemo) s/p ERCP s/p whipple 3/2021, HTN, HLD, CHF (EF: 35%), CAD s/p stents x2 (~15 years ago), AICD (implanted 2005, extracted and re-implantation 9/2021), TAVR (4/2021), bilateral PE (7/2021) on Xarelto, spinal stenosis, macular degeneration, GERD, BPH, Left THR (x2 revisions), left femur fracture (hardware), Osteomyelitis right foot s/p right hallux amputation 9/2021, MSSA bacteremia, explantation and reimplantation of AICD .

## 2022-07-07 NOTE — ASU PATIENT PROFILE, ADULT - NSICDXPASTSURGICALHX_GEN_ALL_CORE_FT
PAST SURGICAL HISTORY:  Benign testicular tumor radiation    H/O Whipple procedure 2/2021    History of laminectomy X3    History of total hip replacement left X 1, 2 revisions    ICD (implantable cardiac defibrillator) in place implanted 2005, replaced 10/4/2021 [employer] Subcutaneous ICD    S/P TAVR (transcatheter aortic valve replacement) 7/2021    Status post amputation of toe of right foot 8/2021    Status post fracture of femur 2017 left with hardware

## 2022-07-07 NOTE — ASU PATIENT PROFILE, ADULT - NSICDXPASTMEDICALHX_GEN_ALL_CORE_FT
PAST MEDICAL HISTORY:  Acute osteomyelitis     Cardiomyopathy     GERD (gastroesophageal reflux disease)     HLD (hyperlipidemia)     [place] (hard of hearing) B/L hearing aids    HTN (hypertension)     MSSA bacteremia 9/2021    Pancreas cancer chemo    Pulmonary embolism

## 2022-07-07 NOTE — ASU PATIENT PROFILE, ADULT - FALL HARM RISK - HARM RISK INTERVENTIONS

## 2022-07-08 NOTE — ASU DISCHARGE PLAN (ADULT/PEDIATRIC) - ***IN THE EVENT THAT YOU DEVELOP A COMPLICATION AND YOU ARE UNABLE TO REACH YOUR OWN PHYSICIAN, YOU MAY CONTACT:
-- Message is from the [facility]--    Reason for Call:PAtient called in requesting her order for MRI is approved and sent to [facility] patient is scheduled for MRI 5/24 please contact patient completed.    Caller Information       Type Contact Phone    05/07/2019 10:18 AM Phone (Incoming) Mk King (Self) [phone] (H)          Alternative phone number: na    Turnaround time given to caller:   \"This message will be sent to [state Provider's name]. The clinical team will fulfill your request as soon as they review your message.\"     Statement Selected

## 2022-07-08 NOTE — ASU DISCHARGE PLAN (ADULT/PEDIATRIC) - CARE PROVIDER_API CALL
Mikhail Gallegos  PLASTIC SURGERY  [address] [place] [id number]  Phone: [phone]  Fax: [phone]  Follow Up Time: 1 week

## 2022-07-08 NOTE — ASU PREOP CHECKLIST - AICD PRESENT
Last Office Visit  - 9-1-2021  Next Office Visit  -     Last Filled  -    Last UDS -   Contract -    Refill fluticasone 50 mcg spray yes decreased leeanne

## 2022-07-08 NOTE — ASU DISCHARGE PLAN (ADULT/PEDIATRIC) - NURSING INSTRUCTIONS
DO NOT take any Tylenol (Acetaminophen) or narcotics containing Tylenol until after  3PM. You received Tylenol during your operation and it can cause damage to your liver if too much is taken within a 24 hour time period.

## 2022-07-08 NOTE — ASU DISCHARGE PLAN (ADULT/PEDIATRIC) - FOLLOW UP APPOINTMENTS
195 may also call Recovery Room (PACU) 24/7 @ [phone]/[facility]

## 2022-07-08 NOTE — ASU DISCHARGE PLAN (ADULT/PEDIATRIC) - NS MD DC FALL RISK RISK
For information on Fall & Injury Prevention, visit: [email or web address] OR  [email or web address] OR  https://www.cdc.gov/steadi/patient.html

## 2022-07-08 NOTE — BRIEF OPERATIVE NOTE - NSICDXBRIEFPROCEDURE_GEN_ALL_CORE_FT
PROCEDURES:  Harvesting of skin graft 08-Jul-2022 09:49:42  Gilbert Philip  Application, graft, skin, split-thickness, to abdomen 08-Jul-2022 09:50:00  Gilbert Philip

## 2022-07-15 NOTE — REASON FOR VISIT
[Post Op: _________] : a [unfilled] post op visit [Family Member] : family member [FreeTextEntry1] : s/p STSG with vac placement DOS: 7/8/22

## 2022-07-19 PROBLEM — I50.9 CHF (CONGESTIVE HEART FAILURE): Status: ACTIVE | Noted: 2021-01-01

## 2022-07-21 PROBLEM — S00.81XA ABRASION, FACE W/O INFECTION: Status: ACTIVE | Noted: 2022-01-01

## 2022-07-21 PROBLEM — Z85.07: Status: RESOLVED | Noted: 2022-01-01 | Resolved: 2022-01-01

## 2022-07-21 PROBLEM — S01.112A EYEBROW LACERATION, LEFT, INITIAL ENCOUNTER: Status: ACTIVE | Noted: 2022-01-01

## 2022-07-22 PROBLEM — M20.5X2: Status: ACTIVE | Noted: 2022-01-01

## 2022-07-22 NOTE — PHYSICAL EXAM
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 [de-identified] : right hallux and 1st metatarsal head amputation site healed, right 2nd dorsal PIPJ ulcer healed [de-identified] : diminished light touch sensation bilaterally [FreeTextEntry2] : 21.0 [FreeTextEntry3] : 16.0 [FreeTextEntry4] : protrusion (see photo) [de-identified] : Intact [de-identified] : Large Serosanguineous [de-identified] : Xeroform/ Dry Dressing [de-identified] : Cleansed with Normal saline\par  [de-identified] : Left Dorsal Hallux- resolved dried blister [de-identified] : 0.6 [de-identified] : 0.6 [de-identified] : No Dressing [de-identified] : Cleansed with Normal saline\par  [FreeTextEntry1] : Left Thigh- Donor site- pt declines assessment of- Tegaderm in place- to be managed by Plastic surgeon as per pt & Dr Harris [de-identified] : Xeroform/ Dry Dressing applied by Dr Harris here today/ Bacitracin & Bandaid at [facility] as per Dr Harris [FreeTextEntry7] : Left side of Head extending from above Eye to Cheek- Dry Laceration/ Abrasions & ecchymosis [de-identified] : Cleansed with Normal saline\par  [de-identified] : None [de-identified] : 100% [de-identified] : None [de-identified] : None

## 2022-07-22 NOTE — HISTORY OF PRESENT ILLNESS
[FreeTextEntry1] : Pt seen for follow up of right 2nd dorsal PIPJ ulcer down to skin, subcutaneous tissue, and fat, healed at this time. Patient seen with left hallux mallet toe deformity.

## 2022-07-22 NOTE — ASSESSMENT
[Verbal] : Verbal [Demo] : Demo [Patient] : Patient [Family member] : Family member [Good - alert, interested, motivated] : Good - alert, interested, motivated [Verbalizes knowledge/Understanding] : Verbalizes knowledge/understanding [Dressing changes] : dressing changes [Skin Care] : skin care [Pressure relief] : pressure relief [Signs and symptoms of infection] : sign and symptoms of infection [How and When to Call] : how and when to call [Off-loading] : off-loading [Patient responsibility to plan of care] : patient responsibility to plan of care [] : Yes [Stable] : stable [Home] : Home [Walker] : Walker [FreeTextEntry2] : Alteration in skin integrity- promote optimal skin integrity\par  [FreeTextEntry4] : Pt seen by both Dr López & Dr Harris/ Photos taken\par Pt accompanied by his Sister Yareli to [facility] today\par Pt states he underwent STSG 3 weeks ago- Dr Harris aware\par Abdomen wound to be managed by Plastic surgeon as per pt & Dr Harris\par Pt reports that he tripped & fell yesterday & hit Head on concrete- pt denies syncope or loss of consciousness- denies n/v or any other discomfort at present - pt states he did not & does not wish to seek medical attention regarding same- Dr Harris aware & discussed same with pt & his Sister\par Pt states he is not interested in resuming HBO txs & that his Plastic surgeon (Dr Gallegos) & his Surgeon (Dr Mercer) do not feel he needs anymore & has already maximized his benefit from HBOT- Dr Harris advised & aware\par Pt to follow up routinely with Podiatrist in his area- Physician Access line contact info provided as per Dr López\par F/U to [facility] in 2 months

## 2022-07-22 NOTE — REVIEW OF SYSTEMS
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

## 2022-07-22 NOTE — VITALS
[] : Yes [de-identified] : Pt denies c/o any pains or discomforts at present [FreeTextEntry5] : Pt states his Cardiologist (Dr Ribera) d/c'd Xarafto on 07/19/22- Dr Harris & Dr López aware

## 2022-07-22 NOTE — PLAN
[FreeTextEntry1] : Patient examined and evaluated at this time.\par Continue local wound care and offloading.\par Discussed the possibility of left foot surgical intervention to prevent ulcerations for the left hallux. Pt to consider at this time. Discussed with patient's daughter via phone regarding patient's condition. Pt remains a high risk for further amputation, limb loss, sepsis, and death.\par Spent 30 minutes for patient care and medical decision making.\par

## 2022-07-26 NOTE — REVIEW OF SYSTEMS
[As Noted in HPI] : as noted in HPI [Negative] : Heme/Lymph [FreeTextEntry7] : Healing abdominal wound, ventral/incisional hernia

## 2022-07-26 NOTE — ASSESSMENT
[Verbal] : Verbal [Demo] : Demo [Patient] : Patient [Family member] : Family member [Good - alert, interested, motivated] : Good - alert, interested, motivated [Verbalizes knowledge/Understanding] : Verbalizes knowledge/understanding [Dressing changes] : dressing changes [Skin Care] : skin care [Pressure relief] : pressure relief [Signs and symptoms of infection] : sign and symptoms of infection [How and When to Call] : how and when to call [Off-loading] : off-loading [Patient responsibility to plan of care] : patient responsibility to plan of care [] : Yes [Stable] : stable [Home] : Home [Walker] : Walker [FreeTextEntry2] : Alteration in skin integrity- promote optimal skin integrity\par  [FreeTextEntry4] : Pt seen by both Dr López & Dr Harris/ Photos taken\par Pt accompanied by his Sister Yareli to [facility] today\par Pt states he underwent STSG 3 weeks ago- Dr Harris aware\par Abdomen wound to be managed by Plastic surgeon as per pt & Dr Harris\par Pt reports that he tripped & fell yesterday & hit Head on concrete- pt denies syncope or loss of consciousness- denies n/v or any other discomfort at present - pt states he did not & does not wish to seek medical attention regarding same- Dr Harris aware & discussed same with pt & his Sister\par Pt states he is not interested in resuming HBO txs & that his Plastic surgeon (Dr Gallegos) & his Surgeon (Dr Mercer) do not feel he needs anymore & has already maximized his benefit from HBOT- Dr Harris advised & aware\par Pt to follow up routinely with Podiatrist in his area- Physician Access line contact info provided as per Dr López\par F/U to [facility] in 2 months [FreeTextEntry1] : 81 year old gentleman s/p Whipple for Pancreatic CA in 3/2021.  Has been followed by his plastic Surgeon Dr Gallegos after developing a large incisional hernia s/p STSG 7/8/22, last seen by him on 7/19.  Has been caring for wound with Xeroform and Aquaphor QOD.  Recommend continued wound care/management as per his Plastic Surgeon and continue to follow with him in one week as previously recommended.\par \par Had tripped and fallen yesterday sustaining superficial abrasion to left forehead/cheek with small superficial laceration to left eyebrow.  Denies LOC, HA, Change in vision.  Denies memory loss or confusion.  Denies headache.\par Clean face gently with soap and water.  Pat dry gently and apply topical antibiotic ointment such as bacitracin or Neosporin daily.  May be covered with non-adherent band-aid as needed.\par \par With regards to his foot/toes, he will be seen today by Podiatrist as well.  Will defer recommendations to him.\par \par Patient had previously undergone HBO therapy but refuses to continue and is no interested in pursuing additional HBO therapy at this time.\par \par 20 minutes spent reviewing records, assessing patient and discussing ongoing wound care/management.\par

## 2022-07-26 NOTE — PHYSICAL EXAM
[de-identified] : Healthy appearing gentleman in no distress [de-identified] : NCAT, SARINA, EOMI.  Abrasion to left forehead, cheek.  Laceratin superficial to left eyebrow. [de-identified] : Large incisional hernia.  Granulating tissue.  Clean.  No signs of obstruction.  Several islands of re-epithelialization throughout wound bed.  Epithelial ingrowth present along periphery. [FreeTextEntry2] : 21.0 [FreeTextEntry3] : 16.0 [FreeTextEntry4] : protrusion (see photo) [de-identified] : Large Serosanguineous [de-identified] : Intact [de-identified] : Xeroform/ Dry Dressing [de-identified] : Cleansed with Normal saline\par  [de-identified] : Left Dorsal Hallux- resolved dried blister [de-identified] : 0.6 [de-identified] : 0.6 [de-identified] : No Dressing [de-identified] : Cleansed with Normal saline\par  [FreeTextEntry1] : Left Thigh- Donor site- pt declines assessment of- Tegaderm in place- to be managed by Plastic surgeon as per pt & Dr Harris [FreeTextEntry7] : Left side of Head extending from above Eye to Cheek- Dry Laceration/ Abrasions & ecchymosis [de-identified] : Xeroform/ Dry Dressing applied by Dr Harris here today/ Bacitracin & Bandaid at [facility] as per Dr Harris [de-identified] : Cleansed with Normal saline\par  [de-identified] : None [de-identified] : None [de-identified] : 100% [de-identified] : None

## 2022-07-26 NOTE — HISTORY OF PRESENT ILLNESS
[FreeTextEntry1] : Pt returns to wound care today for 2 reasons.  \par 1.  s/p Fall with abrasion and laceration to left face and eye brow\par 2.  Re-evaluation of foot (s/p great toe amputation) - TO be seen by Podiatrist

## 2022-08-02 PROBLEM — T81.89XD NONHEALING SURGICAL WOUND, SUBSEQUENT ENCOUNTER: Status: ACTIVE | Noted: 2022-01-01

## 2022-08-02 NOTE — ASSESSMENT
[FreeTextEntry1] : Mr. Cody Gatica is a 81 year old male who presents for follow up. PMH of HTN, HLD, CHF (EF: 35%), CAD s/p stents x2, AICD (implanted 2005, extracted and re-implantation 9/2021), TAVR (4/2021), bilateral PE (7/2021), spinal stenosis, macular degeneration, GERD, BPH, Left THR (x2 revisions), left femur fracture (hardware), osteomyelitis right foot s/p right hallux amputation 9/2021, MSSA bacteremia, and pancreatic cancer s/p gem/abraxane completed in 9/21, who presents for follow up visit s/p whipple operation 10/26/21. Pathology revealed pancreatic adenocarcinoma, 0/31LN, positive pancreatic resection margin requiring adjuvant RT (SBRT 4000 cGy in 5 fxs) completed in 1/19/22. He had a nonhealing abdominal wound and has been getting HBOT for the last several months. Discussed that at this time point the wound should have healed over, however, there is no evidence of skin budding yet. He has other non healing wounds as well, discussed that his medical/genetic makeup may contribute to the lack of healing. Discussed his case with a plastic surgeon who I recommended he see to evaluate for skin graft vs abdominal wall closure. His office will reach out to the patient to schedule. I did review his CT from medical oncology on 6/16/22 and do not see any evidence of recurrent cancer although there is no report available yet. He is scheduled to see medical oncology back next week to discuss. \par \par Today, I personally spent 25 minutes in total time including reviewing imaging and studies, discussing complex treatment regimens, direct face to face time with the patient, patient education and counseling.\par

## 2022-08-02 NOTE — HISTORY OF PRESENT ILLNESS
[de-identified] : Mr. Cody Gatica is a 81 year old male who presents for follow up. PMH of HTN, HLD, CHF (EF: 35%), CAD s/p stents x2, AICD (implanted 2005, extracted and re-implantation 9/2021), TAVR (4/2021), bilateral PE (7/2021), spinal stenosis, macular degeneration, GERD, BPH, Left THR (x2 revisions), left femur fracture (hardware), osteomyelitis right foot s/p right hallux amputation 9/2021, MSSA bacteremia, and pancreatic cancer s/p gem/abraxane completed in 9/21, who presents for follow up visit s/p whipple operation 10/26/21. Pathology revealed pancreatic adenocarcinoma, 0/31LN, positive pancreatic resection margin requiring adjuvant RT (SBRT 4000 cGy in 5 fxs) completed in 1/19/22. He had a nonhealing abdominal wound and has been getting HBOT for the last several months.  The daughter reached out to the office saying the wound care doctor wanted him to be seen by the primary surgeon asap for progression of abdominal bulge. Had a CT from medical oncology 6/16/22. Feeling well and doing well. Eating fine, uses Creon.

## 2022-08-02 NOTE — PHYSICAL EXAM
[FreeTextEntry1] : General: No acute distress. Well nourished and well kempt.\par Head: AT/NC\par Eyes: PERRL. EOMI.\par Pulmonary: No respiratory distress.\par ABD: Soft. NT/ND. No rebound, no guarding, no rigidity. No peritoneal signs. midline abdominal wound intact. Diastasis. No skin budding. \par Extremities: Warm. No edema.\par Neurological: A&O x 4.\par Psychiatric: Normal affect and mood.\par

## 2022-08-07 PROBLEM — G62.9 NEUROPATHY: Status: ACTIVE | Noted: 2022-01-01

## 2022-08-07 PROBLEM — E78.5 HYPERLIPEMIA: Status: ACTIVE | Noted: 2022-01-01

## 2022-08-07 PROBLEM — R68.83 CHILLS: Status: ACTIVE | Noted: 2022-01-01

## 2022-08-07 PROBLEM — M25.50 MULTIPLE JOINT PAIN: Status: ACTIVE | Noted: 2022-01-01

## 2022-08-07 PROBLEM — D64.9 ANEMIA: Status: ACTIVE | Noted: 2022-01-01

## 2022-08-07 PROBLEM — E03.9 ADULT HYPOTHYROIDISM: Status: ACTIVE | Noted: 2022-01-01

## 2022-08-07 NOTE — PHYSICAL EXAM
[No Acute Distress] : no acute distress [Well Nourished] : well nourished [Well Developed] : well developed [Well-Appearing] : well-appearing [Normal Sclera/Conjunctiva] : normal sclera/conjunctiva [PERRL] : pupils equal round and reactive to light [EOMI] : extraocular movements intact [Normal Outer Ear/Nose] : the outer ears and nose were normal in appearance [Normal Oropharynx] : the oropharynx was normal [No JVD] : no jugular venous distention [No Lymphadenopathy] : no lymphadenopathy [Supple] : supple [No Respiratory Distress] : no respiratory distress  [Thyroid Normal, No Nodules] : the thyroid was normal and there were no nodules present [No Accessory Muscle Use] : no accessory muscle use [Clear to Auscultation] : lungs were clear to auscultation bilaterally [Normal Rate] : normal rate  [Regular Rhythm] : with a regular rhythm [Normal S1, S2] : normal S1 and S2 [No Murmur] : no murmur heard [No Carotid Bruits] : no carotid bruits [No Abdominal Bruit] : a ~M bruit was not heard ~T in the abdomen [No Varicosities] : no varicosities [Pedal Pulses Present] : the pedal pulses are present [No Edema] : there was no peripheral edema [No Palpable Aorta] : no palpable aorta [No Extremity Clubbing/Cyanosis] : no extremity clubbing/cyanosis [Soft] : abdomen soft [Non Tender] : non-tender [Non-distended] : non-distended [No Masses] : no abdominal mass palpated [No HSM] : no HSM [Normal Bowel Sounds] : normal bowel sounds [Normal Posterior Cervical Nodes] : no posterior cervical lymphadenopathy [Normal Anterior Cervical Nodes] : no anterior cervical lymphadenopathy [No CVA Tenderness] : no CVA  tenderness [No Spinal Tenderness] : no spinal tenderness [No Joint Swelling] : no joint swelling [Grossly Normal Strength/Tone] : grossly normal strength/tone [Coordination Grossly Intact] : coordination grossly intact [No Focal Deficits] : no focal deficits [Normal Gait] : normal gait [Deep Tendon Reflexes (DTR)] : deep tendon reflexes were 2+ and symmetric [Normal Affect] : the affect was normal [Normal Insight/Judgement] : insight and judgment were intact [de-identified] : failed abdominal skin graph

## 2022-08-07 NOTE — HISTORY OF PRESENT ILLNESS
[FreeTextEntry1] : follow up/ several concerns [de-identified] : 8/7/2022:patient accompanied by son from [place]\par 1. non-healing graft. Plastic surgeon wants to admit patient for a partial thickness skin graft for his diastasis rectus.Family wants the diastasis repaired.\par 2. Occasional chills, no fever\par 3. sleeping better, but continues to have pain during the day. generalized joint pains, back pain, occasional abdominal pain\par \par 06/28/22: \par reports previous constipation that occurred several weeks ago . Reports 2 episodes where he notice a small amount of blood in stool after straining 2 months ago, has not noticed any blood in stool since then. denies hematochezia, hematuria, abdominal pain, dysuria.  \par 8/7/2022:\par \par 1. non-healing graft. Plastic surgeon wants to admit patient for a partial thickness skin graft for his diastasis rectus.Family wants the diastasis repaired.\par 2. Occasional chills, no fever\par 3. sleeping better, but continues to have pain during the day. generalized joint pains, back pain, occasional abdominal pain\par \par 6/28/22-reports mild forgetfulness that has increased in the past few months. \par - pt reports trouble falling asleep, reports 4-6 hours of sleep daily. \par - has been feeling depressed due to hernia repair wound not healing, surgery was done 10/2021, pt states he has a consult with a plastic surgery regarding wound closure.  \par -reports he has lost around 20lbs since his surgery 10/2021, has been feeling depressed due delayed healing of his wound. \par  [Post-hospitalization from ___ Hospital] : Post-hospitalization from [unfilled] Hospital [Admitted on: ___] : The patient was admitted on [unfilled] [Discharge Summary] : discharge summary [Pertinent Labs] : pertinent labs [Discharge Med List] : discharge medication list [Med Reconciliation] : medication reconciliation has been completed [Patient Contacted By: ____] : and contacted by [unfilled] [FreeTextEntry2] : 1. foot ulcer- went home with home care - wound care and [facility]HA still coming for home visits. \par cardiologist- Dr Gu\par 2. concerned about blood sugars\par 3. fatigue/ history of anemia/ not sleeping\par 4. frequent stools, on creon, \par 5. insomnia\par 6. [place]\par 5. anxiety\par

## 2022-08-07 NOTE — HEALTH RISK ASSESSMENT
[Intercurrent hospitalizations] : was admitted to the hospital  [1] : 2) Feeling down, depressed, or hopeless for several days (1) [PHQ-2 Positive] : PHQ-2 Positive [Several Days (1)] : 2.) Feeling down, depressed or hopeless? Several days [1/2 of Days or More (2)] : 4.) Feeling tired or having little energy? Half the days or more [Nearly Every Day (3)] : 7.) Trouble concentrating on things, such as reading a newspaper or watching television? Nearly every day [Not at All (0)] : 8.) Moving or speaking so slowly that other people could have noticed, or the opposite, moving or speaking faster than usual? Not at all [Moderately Severe] : severity of depression is moderately severe [Very Difficult] : How difficult have these problems made it for you to do your work, take care of things at home, or get along with people? Very difficult [PHQ-9 Positive] : PHQ-9 Positive [[id number]] : 17

## 2022-08-07 NOTE — PLAN
[FreeTextEntry1] : 6/22/2022: 1. foot ulcer- went home with home care - wound care and [facility]HA still coming for home visits. \par cardiologist- Dr Gu\par 2. concerned about blood sugars: will check a1c, insulin levels, s/p whipple\par 3. fatigue/ history of anemia/ not sleeping: check cbc, iron\par 4. frequent stools, on creon: f/u with Gi\par 5. insomnia: Melatonin and restart trazodone. Take Cymbalta at night\par 6. [place]: f/u with ENT

## 2022-08-09 NOTE — PHYSICAL THERAPY INITIAL EVALUATION ADULT - PHYSICAL ASSIST/NONPHYSICAL ASSIST: SIT/SUPINE, REHAB EVAL
verbal cues/nonverbal cues (demo/gestures)/1 person assist Imiquimod Counseling:  I discussed with the patient the risks of imiquimod including but not limited to erythema, scaling, itching, weeping, crusting, and pain.  Patient understands that the inflammatory response to imiquimod is variable from person to person and was educated regarded proper titration schedule.  If flu-like symptoms develop, patient knows to discontinue the medication and contact us.

## 2022-09-15 NOTE — H&P PST ADULT - PROBLEM SELECTOR PROBLEM 1
Surgical wound, non healing Other complications of procedures, not elsewhere classified, subsequent encounter

## 2022-09-15 NOTE — H&P PST ADULT - PROBLEM SELECTOR PLAN 2
Patient with PMH of Pulmonary embolism- as per cardiologist instructions to stop Xarelto on 3 days prior to surgery- 07/04/2022.  Patient took last dose of Xarelto on 07/05/2022.  Will email surgeon. Patient instructed to take Entresto , furosemide , amiodarone   with a sip of water on the morning of procedure.   Requesting copy of cardiology evaluation due to hx of CHF . Patient instructed to take Entresto , furosemide , amiodarone   with a sip of water on the morning of procedure.   Requesting copy of cardiology evaluation due to hx of CHF .  Cardiology office aware.

## 2022-09-15 NOTE — H&P PST ADULT - ASSESSMENT
pre op diagnosis of non healing abdominal wound, for pre surgical evaluation prior to scheduled surgery- Split thickness skin graft, placement of wound .VAC with Dr Gallegos     Other complications of procedures, not elsewhere classified, subsequent encounter.

## 2022-09-15 NOTE — H&P PST ADULT - NEGATIVE MUSCULOSKELETAL SYMPTOMS
no arthralgia/no arthritis/no joint swelling/no myalgia/no muscle cramps/no muscle weakness/no stiffness/no arm pain L/no arm pain R/no leg pain L no stiffness

## 2022-09-15 NOTE — H&P PST ADULT - NSICDXPASTMEDICALHX_GEN_ALL_CORE_FT
PAST MEDICAL HISTORY:  Acute osteomyelitis     Cardiomyopathy     GERD (gastroesophageal reflux disease)     HLD (hyperlipidemia)     [place] (hard of hearing) B/L hearing aids    HTN (hypertension)     MSSA bacteremia 9/2021    Pancreas cancer chemo    Pulmonary embolism      PAST MEDICAL HISTORY:  Acute osteomyelitis     Cardiomyopathy     GERD (gastroesophageal reflux disease)     HLD (hyperlipidemia)     [place] (hard of hearing) B/L hearing aids    HTN (hypertension)     MSSA bacteremia 9/2021    Other complications of procedures, not elsewhere classified, subsequent encounter     Pancreas cancer chemo, s/p Whipple    Pulmonary embolism

## 2022-09-15 NOTE — H&P PST ADULT - PROBLEM SELECTOR PLAN 7
AICD- [employer]- MODEL A219 / Serial [id number].  Implant Date - 04/10/2021 AICD- [employer]- MODEL A219 / Serial [id number].  Implant Date - 04/10/2021.  Copy of AICD card in chart.  OR booking notified

## 2022-09-15 NOTE — H&P PST ADULT - ENMT COMMENTS
mild  Restrictive ROM of neck due to cervical surgery in past - as per pt Decreased  ROM of neck due to cervical surgery in past - as per pt

## 2022-09-15 NOTE — H&P PST ADULT - GASTROINTESTINAL COMMENTS
pre op diagnosis - non healing abdominal wound- s/p failed hyperbaric oxygen therapy Pre op diagnosis - Other complications of procedures, not elsewhere classified, subsequent encounter. PMH pancreatic cancer (dx 3/2021, chemo) s/p ERCP s/p whipple 3/2021, non healing wound on abdomen Hx of pancreatic cancer (dx 3/2021, chemo) s/p ERCP s/p whipple 3/2021, non healing wound on abdomen.

## 2022-09-15 NOTE — H&P PST ADULT - HEALTH CARE MAINTENANCE
pt is poor historian, follows up with PCP and cardiologist, all informations obtained from [facility].  Patient didn't bring covid vaccine information- will bring it on DOS  MONICA- [employer]- MODEL A219 Serial [id number]- implanted- 04/10/2021 pt is poor historian, follows up with PCP and cardiologist, all informations obtained from [employer] and daughter.    AICD- [employer]- MODEL A219 / Serial [id number].  Implant Date - 04/10/2021

## 2022-09-15 NOTE — H&P PST ADULT - NS MD HP INPLANTS MED DEV
left hip, left femur hardware/Automatic Implantable Cardioverter Defibrillator/Artificial joint left hip, left femur hardware . Spinal fusion - screws/Automatic Implantable Cardioverter Defibrillator/Artificial joint/Hearing aid left hip, left femur hardware . Spinal fusion - hardware/Automatic Implantable Cardioverter Defibrillator/Artificial joint/Hearing aid

## 2022-09-15 NOTE — H&P PST ADULT - ACTIVITY
walks 2 blocks daily with walker, Daily assistance for wound care and shower by A Walks daily with walker, Daily assistance for wound care and shower by A

## 2022-09-15 NOTE — H&P PST ADULT - SOURCE OF INFORMATION, PROFILE
patient/rehabilitation facility [facility] - [phone]- daughter/patient/family/rehabilitation facility

## 2022-09-15 NOTE — H&P PST ADULT - NSICDXPASTSURGICALHX_GEN_ALL_CORE_FT
PAST SURGICAL HISTORY:  Benign testicular tumor radiation    H/O Whipple procedure 2/2021    History of laminectomy X3    History of total hip replacement left X 1, 2 revisions    ICD (implantable cardiac defibrillator) in place implanted 2005, replaced 10/4/2021 [employer] Subcutaneous ICD    S/P TAVR (transcatheter aortic valve replacement) 7/2021    Status post amputation of toe of right foot 8/2021    Status post fracture of femur 2017 left with hardware     PAST SURGICAL HISTORY:  Benign testicular tumor radiation    H/O Whipple procedure 2/2021, 10/2021    History of laminectomy X3    History of total hip replacement left X 1, 2 revisions    ICD (implantable cardiac defibrillator) in place implanted 2005, replaced 10/4/2021 [employer] Subcutaneous ICD    S/P TAVR (transcatheter aortic valve replacement) 7/2021    Status post amputation of toe of right foot 8/2021    Status post fracture of femur 2017 left with hardware

## 2022-09-15 NOTE — H&P PST ADULT - LAST CARDIAC ANGIOGRAM/IMAGING
2015 - s/p stentsx2 , 2021 s/p cath at [facility] - non obstructive CAD 2015 - s/p stents x2 , 2021 s/p cath at [place] - non obstructive CAD

## 2022-09-15 NOTE — H&P PST ADULT - SKIN/BREAST COMMENTS
patient s/p whipple surgery- non healing wound on abdomen patient s/p whipple surgery- S/P whipple surgery- abdomen wound -dry dressing noted

## 2022-09-15 NOTE — H&P PST ADULT - HISTORY OF PRESENT ILLNESS
81 year old male, presents to PST with pre op diagnosis of non healing abdominal wound, for pre surgical evaluation prior to scheduled surgery- Split thickness skin graft, placement of wound .VAC with Dr Gallegos      *** Of note- PMH pancreatic cancer (dx 3/2021, chemo) s/p ERCP s/p whipple 3/2021, HTN, HLD, CHF (EF: 35%), CAD s/p stents x2 (~15 years ago), AICD (implanted 2005, extracted and re-implantation 9/2021), TAVR (4/2021), bilateral PE (7/2021) on Xarelto, spinal stenosis, macular degeneration, GERD, BPH, Left THR (x2 revisions), left femur fracture (hardware), Osteomyelitis right foot s/p right hallux amputation 9/2021, MSSA bacteremia, explantation and reimplantation of AICD . 81 year old male with pre op dx of other complications of procedures, not elsewhere classified, subsequent encounter is scheduled for split thickness skin graft to abdomen.     81 year old male poor historian  with pre op dx of other complications of procedures, not elsewhere classified, subsequent encounter is scheduled for split thickness skin graft to abdomen.

## 2022-09-15 NOTE — H&P PST ADULT - CARDIOVASCULAR COMMENTS
patient usually does minimal activities- no dyspnea as per patient patient with CHF, HTN, HLD, TAVR,AICD HX- HTN, HLD, CHF (EF: 35%), CAD s/p stents x2 (~15 years ago), AICD Has AICD

## 2022-09-15 NOTE — H&P PST ADULT - PROBLEM SELECTOR PLAN 3
Patient instructed to take metoprolol, Entresto, Amiodarone with a sip of water on the morning of procedure.    Copy of AICD card in chart.    Patient is poor historian- with multiple cardiac history- VTach- s/p AICD, CHF, TAVR, - last cardiology note obtained in chart. Patient instructed to take metoprolol with a sip of water on the morning of procedure.

## 2022-09-20 NOTE — ASU PATIENT PROFILE, ADULT - NSICDXPASTMEDICALHX_GEN_ALL_CORE_FT
PAST MEDICAL HISTORY:  Acute osteomyelitis     Cardiomyopathy     GERD (gastroesophageal reflux disease)     HLD (hyperlipidemia)     [place] (hard of hearing) B/L hearing aids    HTN (hypertension)     MSSA bacteremia 9/2021    Other complications of procedures, not elsewhere classified, subsequent encounter     Pancreas cancer chemo, s/p Whipple    Pulmonary embolism

## 2022-09-20 NOTE — ASU PATIENT PROFILE, ADULT - NSICDXPASTSURGICALHX_GEN_ALL_CORE_FT
PAST SURGICAL HISTORY:  Benign testicular tumor radiation    H/O Whipple procedure 2/2021, 10/2021    History of laminectomy X3    History of total hip replacement left X 1, 2 revisions    ICD (implantable cardiac defibrillator) in place implanted 2005, replaced 10/4/2021 [employer] Subcutaneous ICD    S/P TAVR (transcatheter aortic valve replacement) 7/2021    Status post amputation of toe of right foot 8/2021    Status post fracture of femur 2017 left with hardware

## 2022-09-28 NOTE — ASU PATIENT PROFILE, ADULT - NSICDXPASTMEDICALHX_GEN_ALL_CORE_FT
PAST MEDICAL HISTORY:  Acute osteomyelitis     Cardiomyopathy     GERD (gastroesophageal reflux disease)     HLD (hyperlipidemia)     [place] (hard of hearing) B/L hearing aids    HTN (hypertension)     MSSA bacteremia 9/2021    Pancreas cancer chemo, s/p Whipple    Pulmonary embolism

## 2022-09-28 NOTE — ASU PATIENT PROFILE, ADULT - NSICDXPASTSURGICALHX_GEN_ALL_CORE_FT
PAST SURGICAL HISTORY:  Benign testicular tumor radiation    H/O Whipple procedure 2/2021, 10/2021    History of laminectomy X3    History of total hip replacement left X 1, 2 revisions    ICD (implantable cardiac defibrillator) in place implanted 2005, replaced 10/4/2021 [employer] Subcutaneous ICD    S/P TAVR (transcatheter aortic valve replacement) 7/2021    Status post amputation of toe of right foot 8/2021    Status post fracture of femur 2017 left with hardware

## 2022-09-28 NOTE — ASU PATIENT PROFILE, ADULT - FALL HARM RISK - HARM RISK INTERVENTIONS

## 2022-09-29 NOTE — ASU PREOP CHECKLIST - INTERNAL PROSTHESES
Left Hip and Femur/yes(specify) Left Hip and Femur, Laminectomy/yes(specify) Left Hip and Femur, Laminectomy, 2 Cardiac stents/yes(specify)

## 2022-09-29 NOTE — BRIEF OPERATIVE NOTE - NSICDXBRIEFPROCEDURE_GEN_ALL_CORE_FT
PROCEDURES:  Application, graft, skin, split-thickness, to abdomen 29-Sep-2022 17:16:55  Wayne Ellis

## 2022-09-30 NOTE — PROGRESS NOTE ADULT - SUBJECTIVE AND OBJECTIVE BOX
Plastic Surgery Progress Note (pg [facility]: [id number], NS: [phone])    SUBJECTIVE  The patient was seen and examined. Pt hypotensive to high 70s/low 80s overnight, but asymptomatic     OBJECTIVE  ___________________________________________________  VITAL SIGNS / I&O's   Vital Signs Last 24 Hrs  T(C): 36.8 (30 Sep 2022 06:30), Max: 37 (29 Sep 2022 13:34)  T(F): 98.2 (30 Sep 2022 06:30), Max: 98.6 (29 Sep 2022 13:34)  HR: 50 (30 Sep 2022 06:30) (50 - 67)  BP: 76/40 (30 Sep 2022 06:30) (76/40 - 145/67)  BP(mean): 68 (29 Sep 2022 21:25) (56 - 87)  RR: 18 (30 Sep 2022 06:30) (14 - 19)  SpO2: 100% (30 Sep 2022 06:30) (66% - 100%)    Parameters below as of 30 Sep 2022 06:30  Patient On (Oxygen Delivery Method): nasal cannula  O2 Flow (L/min): 2        29 Sep 2022 07:01  -  30 Sep 2022 07:00  --------------------------------------------------------  IN:    Lactated Ringers: 30 mL    Oral Fluid: 60 mL  Total IN: 90 mL    OUT:  Total OUT: 0 mL    Total NET: 90 mL        ___________________________________________________  PHYSICAL EXAM    -- CONSTITUTIONAL: NAD, lying in bed  -- NEURO: Awake, alert  -- PULM: Non-labored respirations  -- ABDOMEN: vac in place, holding suction  abdomen soft  -- LLE: skin graft dressing in place, with appropriate ooze    ___________________________________________________  LABS            CAPILLARY BLOOD GLUCOSE              ___________________________________________________  MICRO  Recent Cultures:    ___________________________________________________  MEDICATIONS  (STANDING):  acetaminophen     Tablet .. 650 milliGRAM(s) Oral every 6 hours  aMIOdarone    Tablet 200 milliGRAM(s) Oral daily  DULoxetine 60 milliGRAM(s) Oral daily  enoxaparin Injectable 40 milliGRAM(s) SubCutaneous every 24 hours  furosemide    Tablet 20 milliGRAM(s) Oral daily  ibuprofen  Tablet. 400 milliGRAM(s) Oral every 6 hours  influenza  Vaccine (HIGH DOSE) 0.7 milliLiter(s) IntraMuscular once  lactated ringers. 1000 milliLiter(s) (30 mL/Hr) IV Continuous <Continuous>  levothyroxine 25 MICROGram(s) Oral daily  metoprolol succinate ER 50 milliGRAM(s) Oral daily  pancrelipase  (CREON 24,000 Lipase Units) 3 Capsule(s) Oral three times a day with meals  pantoprazole    Tablet 40 milliGRAM(s) Oral before breakfast  QUEtiapine 25 milliGRAM(s) Oral at bedtime  sacubitril 49 mG/valsartan 51 mG 1 Tablet(s) Oral two times a day  tamsulosin 0.4 milliGRAM(s) Oral at bedtime    MEDICATIONS  (PRN):  ALPRAZolam 0.25 milliGRAM(s) Oral daily PRN per pt request  artificial tears (preservative free) Ophthalmic Solution 1 Drop(s) Both EYES every 2 hours PRN Dry Eyes  benzocaine 15 mG/menthol 3.6 mG Lozenge 1 Lozenge Oral every 3 hours PRN Sore Throat  gabapentin 100 milliGRAM(s) Oral three times a day PRN pain  melatonin 3 milliGRAM(s) Oral at bedtime PRN Insomnia  oxyCODONE    IR 5 milliGRAM(s) Oral every 4 hours PRN Moderate Pain (4 - 6)  oxyCODONE    IR 10 milliGRAM(s) Oral every 4 hours PRN Severe Pain (7 - 10)  senna 2 Tablet(s) Oral at bedtime PRN Constipation

## 2022-09-30 NOTE — PROVIDER CONTACT NOTE (OTHER) - ACTION/TREATMENT ORDERED:
As per Plastics Mary ([id number]), no further interventions ordered at this time. Notify provider if any changes of mentation. Ongoing monitoring. Pt safety maintained.

## 2022-09-30 NOTE — PROGRESS NOTE ADULT - ASSESSMENT
89M PMH HTN, cardiomyopathy, GERD, TAVR, L KARIE, whipple s/p STSG to abdomen    Plan  - C/w vac  - C/w home meds  - TOV today  - Albumin bolus, c/w LR  - DVT ppx  - Medicine consult today  - PT consult    Wayne Ellis MD  Plastic and Reconstructive Surgery, PGY2  Available on Microsoft Teams  [facility]: [id number], [facility]: [phone]

## 2022-09-30 NOTE — CONSULT NOTE ADULT - SUBJECTIVE AND OBJECTIVE BOX
Og Spears Jr, MD  page [phone]  HPI: Pt is 81M sCHF s/p AICD Hx AS s/p TAVR hx pancreatic cancer s/p Whipple 10/26/21 s/p chemo and XRT with chronic non-healing abd wound treated with HBOT and skin graft 7/8/22, admitted for scheduled second skin graft, s/p skin graft 9/29/22 and wound vac placement.  He is currently comfortable and denies dyspnea or chest pain.        PAST MEDICAL & SURGICAL HISTORY:  HTN (hypertension)      HLD (hyperlipidemia)      GERD (gastroesophageal reflux disease)      Cardiomyopathy      MSSA bacteremia  9/2021      Acute osteomyelitis      Pulmonary embolism      Pancreas cancer  chemo, s/p Whipple      [place] (hard of hearing)  B/L hearing aids      Other complications of procedures, not elsewhere classified, subsequent encounter      History of total hip replacement  left X 1, 2 revisions      History of laminectomy  X3      ICD (implantable cardiac defibrillator) in place  implanted 2005, replaced 10/4/2021 [employer] Subcutaneous ICD      Benign testicular tumor  radiation      Status post amputation of toe of right foot  8/2021      Status post fracture of femur  2017 left with hardware      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      H/O Whipple procedure  2/2021, 10/2021          Review of Systems:   CONSTITUTIONAL: No fever, weight loss, or fatigue  EYES: No eye pain, visual disturbances, or discharge  ENMT:  [place], no tinnitus, vertigo; No sinus or throat pain  NECK: No pain or stiffness  BREASTS: No pain, masses, or nipple discharge  RESPIRATORY: No cough, wheezing, chills or hemoptysis; No shortness of breath  CARDIOVASCULAR: No chest pain, palpitations, dizziness, or leg swelling  GASTROINTESTINAL: No abdominal or epigastric pain. No nausea, vomiting, or hematemesis; No diarrhea or constipation. No melena or hematochezia.  GENITOURINARY: No dysuria, frequency, hematuria, or incontinence  NEUROLOGICAL: No headaches, memory loss, loss of strength, numbness, or tremors  SKIN: see HPI  LYMPH NODES: No enlarged glands  ENDOCRINE: No heat or cold intolerance; No hair loss  MUSCULOSKELETAL: No joint pain or swelling; No muscle, back, or extremity pain  PSYCHIATRIC: No depression, anxiety, mood swings, or difficulty sleeping  HEME/LYMPH: No easy bruising, or bleeding gums  ALLERY AND IMMUNOLOGIC: No hives or eczema    Allergies    No Known Allergies    Intolerances        Social History: occ etoh    FAMILY HISTORY:  Family history of stroke (Mother)     Noncontributory    MEDICATIONS  (STANDING):  acetaminophen     Tablet .. 650 milliGRAM(s) Oral every 6 hours  aMIOdarone    Tablet 200 milliGRAM(s) Oral daily  DULoxetine 60 milliGRAM(s) Oral daily  enoxaparin Injectable 40 milliGRAM(s) SubCutaneous every 24 hours  furosemide    Tablet 20 milliGRAM(s) Oral daily  ibuprofen  Tablet. 400 milliGRAM(s) Oral every 6 hours  influenza  Vaccine (HIGH DOSE) 0.7 milliLiter(s) IntraMuscular once  lactated ringers. 1000 milliLiter(s) (30 mL/Hr) IV Continuous <Continuous>  levothyroxine 25 MICROGram(s) Oral daily  metoprolol succinate ER 50 milliGRAM(s) Oral daily  pancrelipase  (CREON 24,000 Lipase Units) 3 Capsule(s) Oral three times a day with meals  pantoprazole    Tablet 40 milliGRAM(s) Oral before breakfast  QUEtiapine 25 milliGRAM(s) Oral at bedtime  sacubitril 49 mG/valsartan 51 mG 1 Tablet(s) Oral two times a day  tamsulosin 0.4 milliGRAM(s) Oral at bedtime    MEDICATIONS  (PRN):  ALPRAZolam 0.25 milliGRAM(s) Oral daily PRN per pt request  artificial tears (preservative free) Ophthalmic Solution 1 Drop(s) Both EYES every 2 hours PRN Dry Eyes  benzocaine 15 mG/menthol 3.6 mG Lozenge 1 Lozenge Oral every 3 hours PRN Sore Throat  gabapentin 100 milliGRAM(s) Oral three times a day PRN pain  melatonin 3 milliGRAM(s) Oral at bedtime PRN Insomnia  oxyCODONE    IR 5 milliGRAM(s) Oral every 4 hours PRN Moderate Pain (4 - 6)  oxyCODONE    IR 10 milliGRAM(s) Oral every 4 hours PRN Severe Pain (7 - 10)  senna 2 Tablet(s) Oral at bedtime PRN Constipation      Vital Signs Last 24 Hrs  T(C): 36.9 (30 Sep 2022 14:43), Max: 36.9 (29 Sep 2022 22:31)  T(F): 98.4 (30 Sep 2022 14:43), Max: 98.5 (29 Sep 2022 22:31)  HR: 53 (30 Sep 2022 14:43) (50 - 67)  BP: 101/49 (30 Sep 2022 14:43) (76/40 - 145/67)  BP(mean): 60 (30 Sep 2022 11:25) (56 - 87)  RR: 18 (30 Sep 2022 11:25) (14 - 19)  SpO2: 94% (30 Sep 2022 11:25) (93% - 100%)    Parameters below as of 30 Sep 2022 11:25  Patient On (Oxygen Delivery Method): room air      CAPILLARY BLOOD GLUCOSE            PHYSICAL EXAM:  GENERAL: NAD, well-developed  HEAD:  Atraumatic, Normocephalic  EYES: EOMI, PERRLA, conjunctiva and sclera clear  NECK: Supple, No JVD  CHEST/LUNG: Clear to auscultation bilaterally; No wheeze  HEART: Regular rate and rhythm; No murmurs, rubs, or gallops  ABDOMEN: Soft, Nontender, Nondistended; Bowel sounds present  EXTREMITIES:  2+ Peripheral Pulses, No clubbing, cyanosis, or edema  PSYCH: AAOx3  NEUROLOGY: non-focal  SKIN: abd wound dressed, CDI    LABS:                        8.2    2.13  )-----------( 126      ( 30 Sep 2022 06:52 )             27.3     09-30    138  |  105  |  10  ----------------------------<  111<H>  4.3   |  26  |  0.84    Ca    7.8<L>      30 Sep 2022 06:52    Serum Pro-Brain Natriuretic Peptide: 3006: Acute congestive heart failure is unlikely if NT-proBNP is < 300 pg/mL.  Consider acute congestive heart failure if:  AGE                     NT-proBNP RESULT  ---                           -------------  < 50 YEARS          >  450 pg/mL  50 - 75 YEARS      >  900 pg/mL  > 75 YEARS          > 1800 pg/mL pg/mL (09.30.22 @ 06:52)          Consultant(s) Notes Reviewed:  cardiology, oncology

## 2022-09-30 NOTE — PROVIDER CONTACT NOTE (OTHER) - BACKGROUND
Pt POD#0 split thickness skin graft of L thigh to abdomen with wound vac. PMHx: Hernia repair, pancreatic CA, pulmonary embolism, HTN, cardiomyopathy, HLD. AICD present.
Pt POD#0 split thickness skin graft of L thigh to abdomen with wound vac. PMHx: Hernia repair, pancreatic CA, pulmonary embolism, HTN, cardiomyopathy, HLD. AICD present.

## 2022-09-30 NOTE — CONSULT NOTE ADULT - ASSESSMENT
Pt is 81M sCHF s/p AICD Hx AS s/p TAVR hx pancreatic cancer s/p Whipple 10/26/21 s/p chemo and XRT with chronic non-healing abd wound treated with HBOT and skin graft 7/8/22, admitted for scheduled second skin graft, s/p skin graft 9/29/22 and wound vac placement

## 2022-09-30 NOTE — PROVIDER CONTACT NOTE (OTHER) - ASSESSMENT
Pt AOx4, lethargic. Pt denies lightheadedness. NAD noted. See flowsheet for vitals. Pt has not voided. Pt states he "doesn't feel like I have to pee." Urinal provided, voiding encouraged. 496mL noted upon bladder scan. Pt AOx4, lethargic. See flowsheet for vitals. Pt denies lightheadedness. NAD noted. Pt has not voided. Pt states he "doesn't feel like I have to pee." Urinal provided, voiding encouraged. 496mL noted upon bladder scan.

## 2022-09-30 NOTE — PHYSICAL THERAPY INITIAL EVALUATION ADULT - GENERAL OBSERVATIONS, REHAB EVAL
Pt encountered in semisupine position, no distress, AxOx3/4, with +IV, +pulse oximeter, and +woundvac. Pt agreeable to participate in PT evaluation.

## 2022-09-30 NOTE — PHYSICAL THERAPY INITIAL EVALUATION ADULT - ADDITIONAL COMMENTS
Pt reports that he lives alone in an apartment house with no steps to negotiate. Prior to hospital admission pt was completely independent and used either a single axis cane or rolling walker with ambulation.    Pt left comfortable in bed, NAD, all lines intact, all precautions maintained, with call bell in reach, bed alarm on, and RN aware of PT evaluation.

## 2022-09-30 NOTE — PHYSICAL THERAPY INITIAL EVALUATION ADULT - PATIENT PROFILE REVIEW, REHAB EVAL
No Formal Activity Order in the Computer; spoke with DAT Fry prior to PT evaluation--> Pt OK for PT consult/OOB activity./yes

## 2022-09-30 NOTE — PROVIDER CONTACT NOTE (OTHER) - ACTION/TREATMENT ORDERED:
Plastics assessed pt at bedside. STAT labs and Albumin ordered. As per plastics MD Aguilar, no straight cath ordered. Encourage urination. Ongoing monitoring. Plastics assessed pt at bedside. STAT labs and Albumin IV ordered. As per plastics MD Aguilar, no straight cath ordered. Encourage urination. Ongoing monitoring. Plastics assessed pt at bedside. STAT labs and Albumin IV ordered. As per plastics MD Aguilar, hold BP meds, no straight cath ordered, encourage urination. Ongoing monitoring.

## 2022-09-30 NOTE — PATIENT PROFILE ADULT - FALL HARM RISK - HARM RISK INTERVENTIONS
Assistance with ambulation/Assistance OOB with selected safe patient handling equipment/Communicate Risk of Fall with Harm to all staff/Discuss with provider need for PT consult/Monitor gait and stability/Provide patient with walking aids - walker, cane, crutches/Reinforce activity limits and safety measures with patient and family/Sit up slowly, dangle for a short time, stand at bedside before walking/Tailored Fall Risk Interventions/Use of alarms - bed, chair and/or voice tab/Visual Cue: Yellow wristband and red socks/Bed in lowest position, wheels locked, appropriate side rails in place/Call bell, personal items and telephone in reach/Instruct patient to call for assistance before getting out of bed or chair/Non-slip footwear when patient is out of bed/[place] to call system/Physically safe environment - no spills, clutter or unnecessary equipment/Purposeful Proactive Rounding/Room/bathroom lighting operational, light cord in reach

## 2022-10-01 NOTE — PROGRESS NOTE ADULT - ASSESSMENT
89M PMH HTN, cardiomyopathy, GERD, TAVR, L KARIE, whipple s/p STSG to abdomen    Plan  - C/w vac  - C/w home meds  - C/w hernandez  - DVT ppx  - Medicine recs  - PT consult    Wayne Ellis MD  Plastic and Reconstructive Surgery, PGY2  Available on Microsoft Teams  [facility]: [id number], [facility]: [phone]

## 2022-10-01 NOTE — PROGRESS NOTE ADULT - ASSESSMENT
Pt is 81M sCHF , CAD with 2 stents, s/p AICD Hx AS s/p TAVR hx pancreatic cancer s/p Whipple 10/26/21 s/p chemo and XRT with chronic non-healing abd wound treated with HBOT and skin graft 7/8/22, admitted for scheduled second skin graft, s/p skin graft 9/29/22 and wound vac placement

## 2022-10-01 NOTE — PROGRESS NOTE ADULT - SUBJECTIVE AND OBJECTIVE BOX
Plastic Surgery Progress Note (pg [facility]: [id number], [facility]: [phone])    SUBJECTIVE  The patient was seen and examined. No acute events overnight.    OBJECTIVE  ___________________________________________________  VITAL SIGNS / I&O's   Vital Signs Last 24 Hrs  T(C): 36.6 (01 Oct 2022 05:30), Max: 36.9 (30 Sep 2022 14:43)  T(F): 97.8 (01 Oct 2022 05:30), Max: 98.4 (30 Sep 2022 14:43)  HR: 75 (01 Oct 2022 05:30) (53 - 82)  BP: 145/72 (01 Oct 2022 05:30) (91/50 - 145/72)  BP(mean): 60 (30 Sep 2022 11:25) (60 - 60)  RR: 17 (01 Oct 2022 05:30) (16 - 18)  SpO2: 98% (01 Oct 2022 05:30) (94% - 100%)    Parameters below as of 01 Oct 2022 05:30  Patient On (Oxygen Delivery Method): room air          30 Sep 2022 07:01  -  01 Oct 2022 07:00  --------------------------------------------------------  IN:  Total IN: 0 mL    OUT:    Indwelling Catheter - Urethral (mL): 1250 mL    Intermittent Catheterization - Urethral (mL): 500 mL  Total OUT: 1750 mL    Total NET: -1750 mL        ___________________________________________________  PHYSICAL EXAM    -- CONSTITUTIONAL: NAD, lying in bed  -- NEURO: Awake, alert  -- PULM: Non-labored respirations  -- ABDOMEN: vac in place, holding suction  abdomen soft  -- LLE: skin graft dressing in place, with appropriate ooze  ___________________________________________________  LABS                        8.2    2.13  )-----------( 126      ( 30 Sep 2022 06:52 )             27.3     30 Sep 2022 06:52    138    |  105    |  10     ----------------------------<  111    4.3     |  26     |  0.84     Ca    7.8        30 Sep 2022 06:52        CAPILLARY BLOOD GLUCOSE              ___________________________________________________  MICRO  Recent Cultures:    ___________________________________________________  MEDICATIONS  (STANDING):  acetaminophen     Tablet .. 650 milliGRAM(s) Oral every 6 hours  aMIOdarone    Tablet 200 milliGRAM(s) Oral daily  DULoxetine 60 milliGRAM(s) Oral daily  enoxaparin Injectable 40 milliGRAM(s) SubCutaneous every 24 hours  furosemide    Tablet 20 milliGRAM(s) Oral daily  ibuprofen  Tablet. 400 milliGRAM(s) Oral every 6 hours  influenza  Vaccine (HIGH DOSE) 0.7 milliLiter(s) IntraMuscular once  lactated ringers. 1000 milliLiter(s) (30 mL/Hr) IV Continuous <Continuous>  levothyroxine 25 MICROGram(s) Oral daily  metoprolol succinate ER 50 milliGRAM(s) Oral daily  pancrelipase  (CREON 24,000 Lipase Units) 3 Capsule(s) Oral three times a day with meals  pantoprazole    Tablet 40 milliGRAM(s) Oral before breakfast  QUEtiapine 25 milliGRAM(s) Oral at bedtime  sacubitril 49 mG/valsartan 51 mG 1 Tablet(s) Oral two times a day  tamsulosin 0.4 milliGRAM(s) Oral at bedtime    MEDICATIONS  (PRN):  ALPRAZolam 0.25 milliGRAM(s) Oral daily PRN per pt request  artificial tears (preservative free) Ophthalmic Solution 1 Drop(s) Both EYES every 2 hours PRN Dry Eyes  benzocaine 15 mG/menthol 3.6 mG Lozenge 1 Lozenge Oral every 3 hours PRN Sore Throat  gabapentin 100 milliGRAM(s) Oral three times a day PRN pain  melatonin 3 milliGRAM(s) Oral at bedtime PRN Insomnia  oxyCODONE    IR 5 milliGRAM(s) Oral every 4 hours PRN Moderate Pain (4 - 6)  oxyCODONE    IR 10 milliGRAM(s) Oral every 4 hours PRN Severe Pain (7 - 10)  senna 2 Tablet(s) Oral at bedtime PRN Constipation

## 2022-10-01 NOTE — PROGRESS NOTE ADULT - SUBJECTIVE AND OBJECTIVE BOX
Dr. Anna Christopher  Pager [phone]    PROGRESS NOTE:     Patient is a 81y old  Male who presents with a chief complaint of skin graft (30 Sep 2022 16:56)      SUBJECTIVE / OVERNIGHT EVENTS: pt denies chest pain or sob  ADDITIONAL REVIEW OF SYSTEMS: afebrile     MEDICATIONS  (STANDING):  acetaminophen     Tablet .. 650 milliGRAM(s) Oral every 6 hours  aMIOdarone    Tablet 200 milliGRAM(s) Oral daily  DULoxetine 60 milliGRAM(s) Oral daily  enoxaparin Injectable 40 milliGRAM(s) SubCutaneous every 24 hours  furosemide    Tablet 20 milliGRAM(s) Oral daily  ibuprofen  Tablet. 400 milliGRAM(s) Oral every 6 hours  influenza  Vaccine (HIGH DOSE) 0.7 milliLiter(s) IntraMuscular once  lactated ringers. 1000 milliLiter(s) (30 mL/Hr) IV Continuous <Continuous>  levothyroxine 25 MICROGram(s) Oral daily  metoprolol succinate ER 50 milliGRAM(s) Oral daily  pancrelipase  (CREON 24,000 Lipase Units) 3 Capsule(s) Oral three times a day with meals  pantoprazole    Tablet 40 milliGRAM(s) Oral before breakfast  QUEtiapine 25 milliGRAM(s) Oral at bedtime  sacubitril 49 mG/valsartan 51 mG 1 Tablet(s) Oral two times a day  tamsulosin 0.4 milliGRAM(s) Oral at bedtime    MEDICATIONS  (PRN):  ALPRAZolam 0.25 milliGRAM(s) Oral daily PRN per pt request  artificial tears (preservative free) Ophthalmic Solution 1 Drop(s) Both EYES every 2 hours PRN Dry Eyes  benzocaine 15 mG/menthol 3.6 mG Lozenge 1 Lozenge Oral every 3 hours PRN Sore Throat  gabapentin 100 milliGRAM(s) Oral three times a day PRN pain  melatonin 3 milliGRAM(s) Oral at bedtime PRN Insomnia  oxyCODONE    IR 5 milliGRAM(s) Oral every 4 hours PRN Moderate Pain (4 - 6)  oxyCODONE    IR 10 milliGRAM(s) Oral every 4 hours PRN Severe Pain (7 - 10)  senna 2 Tablet(s) Oral at bedtime PRN Constipation      CAPILLARY BLOOD GLUCOSE        I&O's Summary    30 Sep 2022 07:01  -  01 Oct 2022 07:00  --------------------------------------------------------  IN: 0 mL / OUT: 1750 mL / NET: -1750 mL    01 Oct 2022 07:01  -  01 Oct 2022 13:23  --------------------------------------------------------  IN: 0 mL / OUT: 300 mL / NET: -300 mL        PHYSICAL EXAM:  Vital Signs Last 24 Hrs  T(C): 36.9 (01 Oct 2022 09:48), Max: 36.9 (30 Sep 2022 14:43)  T(F): 98.4 (01 Oct 2022 09:48), Max: 98.4 (30 Sep 2022 14:43)  HR: 85 (01 Oct 2022 09:48) (53 - 85)  BP: 122/75 (01 Oct 2022 09:48) (101/49 - 145/72)  BP(mean): --  RR: 18 (01 Oct 2022 09:48) (16 - 18)  SpO2: 99% (01 Oct 2022 09:48) (96% - 100%)    Parameters below as of 01 Oct 2022 09:48  Patient On (Oxygen Delivery Method): room air      CONSTITUTIONAL: NAD, well-developed  RESPIRATORY: Normal respiratory effort; lungs are clear to auscultation bilaterally  CARDIOVASCULAR: Regular rate and rhythm, normal S1 and S2, no murmur/rub/gallop; No lower extremity edema; Peripheral pulses are 2+ bilaterally  ABDOMEN: abdominal ventral wound on wound VAC  Nontender to palpation, normoactive bowel sounds, no rebound/guarding;  : hernandez in place   MUSCULOSKELETAL: no clubbing or cyanosis of digits; no joint swelling or tenderness to palpation  skin: left thigh skin graft   PSYCH: A+O to person, place, and time; affect appropriate    LABS:                        8.2    2.13  )-----------( 126      ( 30 Sep 2022 06:52 )             27.3     09-30    138  |  105  |  10  ----------------------------<  111<H>  4.3   |  26  |  0.84    Ca    7.8<L>      30 Sep 2022 06:52              RADIOLOGY & ADDITIONAL TESTS:  Results Reviewed:   Imaging Personally Reviewed:    Electrocardiogram Personally Reviewed:    COORDINATION OF CARE:  Care Discussed with Consultants/Other Providers [Y/N]:  Prior or Outpatient Records Reviewed [Y/N]:

## 2022-10-02 NOTE — PROGRESS NOTE ADULT - PROBLEM SELECTOR PLAN 1
s/p STSG to abdomen, Wound vac placement on 9/29  Management per plastics.  anemia noted, hgb drop from 9.5 to 8.2 on 9/30, trend CBC, no sign of active bleed, transfuse prn to keep hgb>8
s/p STSG to abdomen, Wound vac placement on 9/29  Management per plastics.  anemia noted, hgb drop from 9.5 to 8.2, trend, no sign of active bleed, transfuse prn to keep hgb>8

## 2022-10-02 NOTE — PROGRESS NOTE ADULT - ASSESSMENT
89M PMH HTN, cardiomyopathy, GERD, TAVR, L KARIE, whipple s/p STSG to abdomen    Plan  - C/w vac  - C/w home meds  - C/w hernandez  - DVT ppx  - Medicine recs  - PT recs: TRINA Ellis MD  Plastic and Reconstructive Surgery, PGY2  Available on [employer] Teams  [facility]: [id number], [facility]: [phone]

## 2022-10-02 NOTE — PROGRESS NOTE ADULT - SUBJECTIVE AND OBJECTIVE BOX
Dr. Anna Christopher  Pager [phone]    PROGRESS NOTE:     Patient is a 81y old  Male who presents with a chief complaint of abdominal wound (01 Oct 2022 13:21)      SUBJECTIVE / OVERNIGHT EVENTS: afebrile, no chest pain/sob  ADDITIONAL REVIEW OF SYSTEMS: c/o burning sensation left thigh skin graft site    MEDICATIONS  (STANDING):  acetaminophen     Tablet .. 650 milliGRAM(s) Oral every 6 hours  aMIOdarone    Tablet 200 milliGRAM(s) Oral daily  DULoxetine 60 milliGRAM(s) Oral daily  enoxaparin Injectable 40 milliGRAM(s) SubCutaneous every 24 hours  furosemide    Tablet 20 milliGRAM(s) Oral daily  ibuprofen  Tablet. 400 milliGRAM(s) Oral every 6 hours  influenza  Vaccine (HIGH DOSE) 0.7 milliLiter(s) IntraMuscular once  lactated ringers. 1000 milliLiter(s) (30 mL/Hr) IV Continuous <Continuous>  levothyroxine 25 MICROGram(s) Oral daily  metoprolol succinate ER 50 milliGRAM(s) Oral daily  pancrelipase  (CREON 24,000 Lipase Units) 3 Capsule(s) Oral three times a day with meals  pantoprazole    Tablet 40 milliGRAM(s) Oral before breakfast  QUEtiapine 25 milliGRAM(s) Oral at bedtime  sacubitril 49 mG/valsartan 51 mG 1 Tablet(s) Oral two times a day  tamsulosin 0.4 milliGRAM(s) Oral at bedtime    MEDICATIONS  (PRN):  ALPRAZolam 0.25 milliGRAM(s) Oral daily PRN per pt request  artificial tears (preservative free) Ophthalmic Solution 1 Drop(s) Both EYES every 2 hours PRN Dry Eyes  benzocaine 15 mG/menthol 3.6 mG Lozenge 1 Lozenge Oral every 3 hours PRN Sore Throat  gabapentin 100 milliGRAM(s) Oral three times a day PRN pain  melatonin 3 milliGRAM(s) Oral at bedtime PRN Insomnia  oxyCODONE    IR 5 milliGRAM(s) Oral every 4 hours PRN Moderate Pain (4 - 6)  oxyCODONE    IR 10 milliGRAM(s) Oral every 4 hours PRN Severe Pain (7 - 10)  senna 2 Tablet(s) Oral at bedtime PRN Constipation      CAPILLARY BLOOD GLUCOSE        I&O's Summary    01 Oct 2022 07:01  -  02 Oct 2022 07:00  --------------------------------------------------------  IN: 0 mL / OUT: 1890 mL / NET: -1890 mL    02 Oct 2022 07:01  -  02 Oct 2022 13:19  --------------------------------------------------------  IN: 0 mL / OUT: 600 mL / NET: -600 mL        PHYSICAL EXAM:  Vital Signs Last 24 Hrs  T(C): 36.9 (02 Oct 2022 09:45), Max: 36.9 (02 Oct 2022 09:45)  T(F): 98.4 (02 Oct 2022 09:45), Max: 98.4 (02 Oct 2022 09:45)  HR: 74 (02 Oct 2022 09:45) (59 - 74)  BP: 121/72 (02 Oct 2022 09:45) (100/45 - 121/72)  BP(mean): --  RR: 17 (02 Oct 2022 09:45) (15 - 17)  SpO2: 98% (02 Oct 2022 09:45) (97% - 100%)    Parameters below as of 02 Oct 2022 09:45  Patient On (Oxygen Delivery Method): room air    CONSTITUTIONAL: NAD, well-developed  RESPIRATORY: Normal respiratory effort; lungs are clear to auscultation bilaterally  CARDIOVASCULAR: Regular rate and rhythm, normal S1 and S2, no murmur/rub/gallop; No lower extremity edema; Peripheral pulses are 2+ bilaterally  ABDOMEN: abdominal ventral wound on wound VAC  Nontender to palpation, normoactive bowel sounds, no rebound/guarding;  : hernandez in place   MUSCULOSKELETAL: no clubbing or cyanosis of digits; no joint swelling or tenderness to palpation  skin: left thigh skin graft   PSYCH: A+O to person, place, and time; affect appropriate      LABS:                      RADIOLOGY & ADDITIONAL TESTS:  Results Reviewed:   Imaging Personally Reviewed:  Electrocardiogram Personally Reviewed:    COORDINATION OF CARE:  Care Discussed with Consultants/Other Providers [Y/N]:  Prior or Outpatient Records Reviewed [Y/N]:

## 2022-10-02 NOTE — PROGRESS NOTE ADULT - SUBJECTIVE AND OBJECTIVE BOX
Plastic Surgery Progress Note (pg [facility]: [id number], [facility]: [phone])    SUBJECTIVE  The patient was seen and examined. No acute events overnight.    OBJECTIVE  ___________________________________________________  VITAL SIGNS / I&O's   Vital Signs Last 24 Hrs  T(C): 36.4 (02 Oct 2022 05:25), Max: 37.2 (01 Oct 2022 13:12)  T(F): 97.6 (02 Oct 2022 05:25), Max: 98.9 (01 Oct 2022 13:12)  HR: 66 (02 Oct 2022 05:25) (59 - 88)  BP: 115/55 (02 Oct 2022 05:25) (100/45 - 122/75)  BP(mean): --  RR: 16 (02 Oct 2022 05:25) (15 - 18)  SpO2: 97% (02 Oct 2022 05:25) (97% - 100%)    Parameters below as of 02 Oct 2022 05:25  Patient On (Oxygen Delivery Method): room air          01 Oct 2022 07:01  -  02 Oct 2022 07:00  --------------------------------------------------------  IN:  Total IN: 0 mL    OUT:    Indwelling Catheter - Urethral (mL): 1750 mL    VAC (Vacuum Assisted Closure) System (mL): 140 mL  Total OUT: 1890 mL    Total NET: -1890 mL        ___________________________________________________  PHYSICAL EXAM    -- CONSTITUTIONAL: NAD, lying in bed  -- NEURO: Awake, alert  -- PULM: Non-labored respirations  -- ABDOMEN: vac in place, holding suction  abdomen soft  -- LLE: skin graft dressing in place, with appropriate ooze    ___________________________________________________  LABS            CAPILLARY BLOOD GLUCOSE              ___________________________________________________  MICRO  Recent Cultures:    ___________________________________________________  MEDICATIONS  (STANDING):  acetaminophen     Tablet .. 650 milliGRAM(s) Oral every 6 hours  aMIOdarone    Tablet 200 milliGRAM(s) Oral daily  DULoxetine 60 milliGRAM(s) Oral daily  enoxaparin Injectable 40 milliGRAM(s) SubCutaneous every 24 hours  furosemide    Tablet 20 milliGRAM(s) Oral daily  ibuprofen  Tablet. 400 milliGRAM(s) Oral every 6 hours  influenza  Vaccine (HIGH DOSE) 0.7 milliLiter(s) IntraMuscular once  lactated ringers. 1000 milliLiter(s) (30 mL/Hr) IV Continuous <Continuous>  levothyroxine 25 MICROGram(s) Oral daily  metoprolol succinate ER 50 milliGRAM(s) Oral daily  pancrelipase  (CREON 24,000 Lipase Units) 3 Capsule(s) Oral three times a day with meals  pantoprazole    Tablet 40 milliGRAM(s) Oral before breakfast  QUEtiapine 25 milliGRAM(s) Oral at bedtime  sacubitril 49 mG/valsartan 51 mG 1 Tablet(s) Oral two times a day  tamsulosin 0.4 milliGRAM(s) Oral at bedtime    MEDICATIONS  (PRN):  ALPRAZolam 0.25 milliGRAM(s) Oral daily PRN per pt request  artificial tears (preservative free) Ophthalmic Solution 1 Drop(s) Both EYES every 2 hours PRN Dry Eyes  benzocaine 15 mG/menthol 3.6 mG Lozenge 1 Lozenge Oral every 3 hours PRN Sore Throat  gabapentin 100 milliGRAM(s) Oral three times a day PRN pain  melatonin 3 milliGRAM(s) Oral at bedtime PRN Insomnia  oxyCODONE    IR 5 milliGRAM(s) Oral every 4 hours PRN Moderate Pain (4 - 6)  oxyCODONE    IR 10 milliGRAM(s) Oral every 4 hours PRN Severe Pain (7 - 10)  senna 2 Tablet(s) Oral at bedtime PRN Constipation

## 2022-10-02 NOTE — PROGRESS NOTE ADULT - PROBLEM SELECTOR PLAN 2
Clinically euvolemic  h/o cad/2 stents, VT s/p AICD, TAVR (4/2021), baseline EF 30-35% on echo, caution with IVF, risk for fluid overload/acute chf  pro-bnp 3006   Continue lasix, entresto.
Clinically euvolemic  h/o cad/2 stents, VT s/p AICD, TAVR (4/2021), baseline EF 30-35% on echo, caution with IVF, risk for fluid overload/acute chf  pro-bnp 3006   Continue lasix, entresto.

## 2022-10-03 NOTE — PROGRESS NOTE ADULT - NUTRITIONAL ASSESSMENT
ASSESSMENT/PLAN:     89M PMH HTN, cardiomyopathy, GERD, TAVR, L KARIE, whipple s/p STSG to abdomen    Plan  - C/w vac  - C/w home meds  - C/w hernandez  - DVT ppx  - Medicine recs  - PT recs: TRINA Medley MD PGY-1  Surgery  [facility]: [id number]  Available on Teams

## 2022-10-03 NOTE — DISCHARGE NOTE PROVIDER - HOSPITAL COURSE
JULEE was admitted to [facility] on 9/29. The patient had a split thickness skin graft performed in the OR. A wound vac was placed over the recipient site on the patient's abdomen. Post-operatively the patient was sent to the PACU, the patient was hemodynamically stable and sent to a surgical floor. The patient's pain was controlled by IV pain medications transitioned to po medications without issue. The patient was advanced to regular diet and tolerated it well.      POD1, the patient was hypotensive overnight, but remained asymptomatic. His hypotension was managed with IV fluids and albumin.   POD2-4: Patient was followed by the medicine team for management of his medical comorbidities. No significant changes were made to his home regiment. The patient was seen by Physical Therapy, who recommended the patient be discharged to subacute rehab.   POD7: the patient's wound vac was removed, and his wound was dressed with xeroform, telfa, abdominal pad, and the patient was advised to wear an abdominal binder.      The patient was hemodynamically stable, placed on home medications, and discharged with instructions to follow up with Dr. Gallegos in 1 week and had no other issues.

## 2022-10-03 NOTE — PROGRESS NOTE ADULT - ASSESSMENT
80 yo M w/ HFrEF (EF 30-35%), VT, s/p ACID, AS s/p TAVR, CAD s/p stents, GERD, hypothyroid, anxiety, pancreatic ca s/p whipple 10/26/21, chemo and xrt with non-healing abdominal wound treated w/ HBOT and skin graft 7/8/22, admitted for second skin graft, s/p procedure 9/29 and wound vac placement.     # open wound of abdominal wall, s/p STSG to abdomen, wound vac placement on 9/29  - complaining of some pain at L thigh skin graft donor site  - has abdominal wound vac in place  - c/w pain management, wound care and post-op management as per PRS/Surgery  - bowel regimen to prevent opioid induced constipation  - would be judicious re: use of NSAIDS in elderly patients, on ppi for GERD    # HFrEF (EF 30-35%), VT, s/p AICD, AS, s/p TAVR, CAD s/p stents  - denies chest pain, clinically euvolemic on exam, respiratory status stable on RA  - c/w amiodarone for hx VT  - c/w lasix, toprol, entresto for chronic HFrEF  - current SBP in acceptable range    # Pancreatic cancer  - c/w creon, otpt f/u    # Hypothyroid  - c/w synthroid    # Anxiety  - on seroquel, cymbalta, with xanax prn,     # BPH  - c/w flomax  - currently w/ follow in place    Need for prophylactic measure  VTE ppx: lovenox as per PRS/Sx    Dispo: anticipate TRINA

## 2022-10-03 NOTE — PROGRESS NOTE ADULT - ASSESSMENT
Plastic Surgery Progress Note (pg [facility]: [id number], [facility]: [phone])    SUBJECTIVE  The patient was seen and examined. No acute events overnight. Patient is complaining of pain to the left thigh, skin donor site, but otherwise has no acute complaints.     OBJECTIVE  ___________________________________________________  VITAL SIGNS / I&O's   Vital Signs Last 24 Hrs  T(C): 36.3 (03 Oct 2022 05:30), Max: 36.9 (02 Oct 2022 09:45)  T(F): 97.4 (03 Oct 2022 05:30), Max: 98.4 (02 Oct 2022 09:45)  HR: 74 (03 Oct 2022 05:30) (60 - 77)  BP: 111/46 (03 Oct 2022 05:30) (103/45 - 136/63)  BP(mean): --  RR: 17 (03 Oct 2022 05:30) (17 - 18)  SpO2: 96% (03 Oct 2022 05:30) (96% - 100%)    Parameters below as of 03 Oct 2022 05:30  Patient On (Oxygen Delivery Method): room air          02 Oct 2022 07:01  -  03 Oct 2022 07:00  --------------------------------------------------------  IN:  Total IN: 0 mL    OUT:    Indwelling Catheter - Urethral (mL): 2500 mL    VAC (Vacuum Assisted Closure) System (mL): 100 mL  Total OUT: 2600 mL    Total NET: -2600 mL        ___________________________________________________  PHYSICAL EXAM    -- CONSTITUTIONAL: NAD, lying in bed  -- NEURO: Awake, alert  -- PULM: Non-labored respirations  -- ABDOMEN: vac in place, holding suction, abdomen soft, dressing is dry  -- LLE: skin graft dressing in place, with appropriate ooze    ___________________________________________________  LABS            CAPILLARY BLOOD GLUCOSE              ___________________________________________________  MICRO  Recent Cultures:    ___________________________________________________  MEDICATIONS  (STANDING):  acetaminophen     Tablet .. 650 milliGRAM(s) Oral every 6 hours  aMIOdarone    Tablet 200 milliGRAM(s) Oral daily  DULoxetine 60 milliGRAM(s) Oral daily  enoxaparin Injectable 40 milliGRAM(s) SubCutaneous every 24 hours  furosemide    Tablet 20 milliGRAM(s) Oral daily  ibuprofen  Tablet. 400 milliGRAM(s) Oral every 6 hours  influenza  Vaccine (HIGH DOSE) 0.7 milliLiter(s) IntraMuscular once  lactated ringers. 1000 milliLiter(s) (30 mL/Hr) IV Continuous <Continuous>  levothyroxine 25 MICROGram(s) Oral daily  metoprolol succinate ER 50 milliGRAM(s) Oral daily  pancrelipase  (CREON 24,000 Lipase Units) 3 Capsule(s) Oral three times a day with meals  pantoprazole    Tablet 40 milliGRAM(s) Oral before breakfast  QUEtiapine 25 milliGRAM(s) Oral at bedtime  sacubitril 49 mG/valsartan 51 mG 1 Tablet(s) Oral two times a day  tamsulosin 0.4 milliGRAM(s) Oral at bedtime    MEDICATIONS  (PRN):  ALPRAZolam 0.25 milliGRAM(s) Oral daily PRN per pt request  artificial tears (preservative free) Ophthalmic Solution 1 Drop(s) Both EYES every 2 hours PRN Dry Eyes  benzocaine 15 mG/menthol 3.6 mG Lozenge 1 Lozenge Oral every 3 hours PRN Sore Throat  calcium carbonate    500 mG (Tums) Chewable 1 Tablet(s) Chew every 4 hours PRN Indigestion  gabapentin 100 milliGRAM(s) Oral three times a day PRN pain  melatonin 3 milliGRAM(s) Oral at bedtime PRN Insomnia  oxyCODONE    IR 5 milliGRAM(s) Oral every 4 hours PRN Moderate Pain (4 - 6)  oxyCODONE    IR 10 milliGRAM(s) Oral every 4 hours PRN Severe Pain (7 - 10)  senna 2 Tablet(s) Oral at bedtime PRN Constipation

## 2022-10-03 NOTE — PROGRESS NOTE ADULT - SUBJECTIVE AND OBJECTIVE BOX
Plastic Surgery Progress Note (pg [facility]: [id number], [facility]: [phone])    SUBJECTIVE  The patient was seen and examined. No acute events overnight.    OBJECTIVE  ___________________________________________________  VITAL SIGNS / I&O's   Vital Signs Last 24 Hrs  T(C): 36.3 (03 Oct 2022 05:30), Max: 36.9 (02 Oct 2022 09:45)  T(F): 97.4 (03 Oct 2022 05:30), Max: 98.4 (02 Oct 2022 09:45)  HR: 74 (03 Oct 2022 05:30) (60 - 77)  BP: 111/46 (03 Oct 2022 05:30) (103/45 - 136/63)  BP(mean): --  RR: 17 (03 Oct 2022 05:30) (17 - 18)  SpO2: 96% (03 Oct 2022 05:30) (96% - 100%)    Parameters below as of 03 Oct 2022 05:30  Patient On (Oxygen Delivery Method): room air          02 Oct 2022 07:01  -  03 Oct 2022 07:00  --------------------------------------------------------  IN:  Total IN: 0 mL    OUT:    Indwelling Catheter - Urethral (mL): 2500 mL    VAC (Vacuum Assisted Closure) System (mL): 100 mL  Total OUT: 2600 mL    Total NET: -2600 mL        ___________________________________________________  PHYSICAL EXAM    -- CONSTITUTIONAL: NAD, lying in bed  -- NEURO: Awake, alert  -- PULM: Non-labored respirations  -- ABDOMEN: vac in place, holding suction  abdomen soft  -- LLE: skin graft dressing in place, with appropriate ooze    ___________________________________________________  LABS            CAPILLARY BLOOD GLUCOSE              ___________________________________________________  MICRO  Recent Cultures:    ___________________________________________________  MEDICATIONS  (STANDING):  acetaminophen     Tablet .. 650 milliGRAM(s) Oral every 6 hours  aMIOdarone    Tablet 200 milliGRAM(s) Oral daily  DULoxetine 60 milliGRAM(s) Oral daily  enoxaparin Injectable 40 milliGRAM(s) SubCutaneous every 24 hours  furosemide    Tablet 20 milliGRAM(s) Oral daily  ibuprofen  Tablet. 400 milliGRAM(s) Oral every 6 hours  influenza  Vaccine (HIGH DOSE) 0.7 milliLiter(s) IntraMuscular once  lactated ringers. 1000 milliLiter(s) (30 mL/Hr) IV Continuous <Continuous>  levothyroxine 25 MICROGram(s) Oral daily  metoprolol succinate ER 50 milliGRAM(s) Oral daily  pancrelipase  (CREON 24,000 Lipase Units) 3 Capsule(s) Oral three times a day with meals  pantoprazole    Tablet 40 milliGRAM(s) Oral before breakfast  QUEtiapine 25 milliGRAM(s) Oral at bedtime  sacubitril 49 mG/valsartan 51 mG 1 Tablet(s) Oral two times a day  tamsulosin 0.4 milliGRAM(s) Oral at bedtime    MEDICATIONS  (PRN):  ALPRAZolam 0.25 milliGRAM(s) Oral daily PRN per pt request  artificial tears (preservative free) Ophthalmic Solution 1 Drop(s) Both EYES every 2 hours PRN Dry Eyes  benzocaine 15 mG/menthol 3.6 mG Lozenge 1 Lozenge Oral every 3 hours PRN Sore Throat  calcium carbonate    500 mG (Tums) Chewable 1 Tablet(s) Chew every 4 hours PRN Indigestion  gabapentin 100 milliGRAM(s) Oral three times a day PRN pain  melatonin 3 milliGRAM(s) Oral at bedtime PRN Insomnia  oxyCODONE    IR 5 milliGRAM(s) Oral every 4 hours PRN Moderate Pain (4 - 6)  oxyCODONE    IR 10 milliGRAM(s) Oral every 4 hours PRN Severe Pain (7 - 10)  senna 2 Tablet(s) Oral at bedtime PRN Constipation

## 2022-10-03 NOTE — PROGRESS NOTE ADULT - SUBJECTIVE AND OBJECTIVE BOX
[facility] Medicine  Pager [phone]    Patient is a 81y old  Male who presents with a chief complaint of Skin graft to abdomen (03 Oct 2022 08:55)      INTERVAL HPI/OVERNIGHT EVENTS:    MEDICATIONS  (STANDING):  acetaminophen     Tablet .. 650 milliGRAM(s) Oral every 6 hours  aMIOdarone    Tablet 200 milliGRAM(s) Oral daily  DULoxetine 60 milliGRAM(s) Oral daily  enoxaparin Injectable 40 milliGRAM(s) SubCutaneous every 24 hours  furosemide    Tablet 20 milliGRAM(s) Oral daily  ibuprofen  Tablet. 400 milliGRAM(s) Oral every 6 hours  influenza  Vaccine (HIGH DOSE) 0.7 milliLiter(s) IntraMuscular once  lactated ringers. 1000 milliLiter(s) (30 mL/Hr) IV Continuous <Continuous>  levothyroxine 25 MICROGram(s) Oral daily  metoprolol succinate ER 50 milliGRAM(s) Oral daily  pancrelipase  (CREON 24,000 Lipase Units) 3 Capsule(s) Oral three times a day with meals  pantoprazole    Tablet 40 milliGRAM(s) Oral before breakfast  QUEtiapine 25 milliGRAM(s) Oral at bedtime  sacubitril 49 mG/valsartan 51 mG 1 Tablet(s) Oral two times a day  tamsulosin 0.4 milliGRAM(s) Oral at bedtime    MEDICATIONS  (PRN):  ALPRAZolam 0.25 milliGRAM(s) Oral daily PRN per pt request  artificial tears (preservative free) Ophthalmic Solution 1 Drop(s) Both EYES every 2 hours PRN Dry Eyes  benzocaine 15 mG/menthol 3.6 mG Lozenge 1 Lozenge Oral every 3 hours PRN Sore Throat  calcium carbonate    500 mG (Tums) Chewable 1 Tablet(s) Chew every 4 hours PRN Indigestion  gabapentin 100 milliGRAM(s) Oral three times a day PRN pain  melatonin 3 milliGRAM(s) Oral at bedtime PRN Insomnia  oxyCODONE    IR 5 milliGRAM(s) Oral every 4 hours PRN Moderate Pain (4 - 6)  oxyCODONE    IR 10 milliGRAM(s) Oral every 4 hours PRN Severe Pain (7 - 10)  senna 2 Tablet(s) Oral at bedtime PRN Constipation      Allergies    No Known Allergies    Intolerances        REVIEW OF SYSTEMS:  Please see interval HPI:    Vital Signs Last 24 Hrs  T(C): 36.8 (03 Oct 2022 09:18), Max: 36.8 (03 Oct 2022 01:20)  T(F): 98.2 (03 Oct 2022 09:18), Max: 98.2 (03 Oct 2022 01:20)  HR: 77 (03 Oct 2022 09:18) (60 - 77)  BP: 109/52 (03 Oct 2022 09:18) (103/45 - 136/63)  BP(mean): --  RR: 16 (03 Oct 2022 09:18) (16 - 18)  SpO2: 95% (03 Oct 2022 09:18) (95% - 100%)    Parameters below as of 03 Oct 2022 09:18  Patient On (Oxygen Delivery Method): room air      I&O's Detail    02 Oct 2022 07:01  -  03 Oct 2022 07:00  --------------------------------------------------------  IN:  Total IN: 0 mL    OUT:    Indwelling Catheter - Urethral (mL): 2500 mL    VAC (Vacuum Assisted Closure) System (mL): 100 mL  Total OUT: 2600 mL    Total NET: -2600 mL      03 Oct 2022 07:01  -  03 Oct 2022 11:55  --------------------------------------------------------  IN:  Total IN: 0 mL    OUT:    Indwelling Catheter - Urethral (mL): 250 mL  Total OUT: 250 mL    Total NET: -250 mL            PHYSICAL EXAM:  GENERAL:   HEAD:    EYES:   ENMT:   NECK:   NERVOUS SYSTEM:    CHEST/LUNG:   HEART:   ABDOMEN:   EXTREMITIES:    LYMPH:   SKIN:     LABS:            CAPILLARY BLOOD GLUCOSE        BLOOD CULTURE    RADIOLOGY & ADDITIONAL TESTS:    Imaging Personally Reviewed:  [ ] YES     Consultant(s) Notes Reviewed:      Care Discussed with Consultants/Other Providers: [facility] Medicine  Pager [phone]    Patient is a 81y old  Male who presents with a chief complaint of Skin graft to abdomen (03 Oct 2022 08:55)      INTERVAL HPI/OVERNIGHT EVENTS:  Reporting pain from L thigh skin graft site (requested pain med from RN), otherwise doing OK, denies fever/chills/SOB. Is eating and having BMs. Understands plan for rehab placement. Assisted patient with finding remote control (for TV).     MEDICATIONS  (STANDING):  acetaminophen     Tablet .. 650 milliGRAM(s) Oral every 6 hours  aMIOdarone    Tablet 200 milliGRAM(s) Oral daily  DULoxetine 60 milliGRAM(s) Oral daily  enoxaparin Injectable 40 milliGRAM(s) SubCutaneous every 24 hours  furosemide    Tablet 20 milliGRAM(s) Oral daily  ibuprofen  Tablet. 400 milliGRAM(s) Oral every 6 hours  influenza  Vaccine (HIGH DOSE) 0.7 milliLiter(s) IntraMuscular once  lactated ringers. 1000 milliLiter(s) (30 mL/Hr) IV Continuous <Continuous>  levothyroxine 25 MICROGram(s) Oral daily  metoprolol succinate ER 50 milliGRAM(s) Oral daily  pancrelipase  (CREON 24,000 Lipase Units) 3 Capsule(s) Oral three times a day with meals  pantoprazole    Tablet 40 milliGRAM(s) Oral before breakfast  QUEtiapine 25 milliGRAM(s) Oral at bedtime  sacubitril 49 mG/valsartan 51 mG 1 Tablet(s) Oral two times a day  tamsulosin 0.4 milliGRAM(s) Oral at bedtime    MEDICATIONS  (PRN):  ALPRAZolam 0.25 milliGRAM(s) Oral daily PRN per pt request  artificial tears (preservative free) Ophthalmic Solution 1 Drop(s) Both EYES every 2 hours PRN Dry Eyes  benzocaine 15 mG/menthol 3.6 mG Lozenge 1 Lozenge Oral every 3 hours PRN Sore Throat  calcium carbonate    500 mG (Tums) Chewable 1 Tablet(s) Chew every 4 hours PRN Indigestion  gabapentin 100 milliGRAM(s) Oral three times a day PRN pain  melatonin 3 milliGRAM(s) Oral at bedtime PRN Insomnia  oxyCODONE    IR 5 milliGRAM(s) Oral every 4 hours PRN Moderate Pain (4 - 6)  oxyCODONE    IR 10 milliGRAM(s) Oral every 4 hours PRN Severe Pain (7 - 10)  senna 2 Tablet(s) Oral at bedtime PRN Constipation      Allergies  No Known Allergies    Intolerances    REVIEW OF SYSTEMS:  Please see interval HPI:    Vital Signs Last 24 Hrs  T(C): 36.8 (03 Oct 2022 09:18), Max: 36.8 (03 Oct 2022 01:20)  T(F): 98.2 (03 Oct 2022 09:18), Max: 98.2 (03 Oct 2022 01:20)  HR: 77 (03 Oct 2022 09:18) (60 - 77)  BP: 109/52 (03 Oct 2022 09:18) (103/45 - 136/63)  RR: 16 (03 Oct 2022 09:18) (16 - 18)  SpO2: 95% (03 Oct 2022 09:18) (95% - 100%)    Parameters below as of 03 Oct 2022 09:18  Patient On (Oxygen Delivery Method): room air      I&O's Detail    02 Oct 2022 07:01  -  03 Oct 2022 07:00  --------------------------------------------------------  IN:  Total IN: 0 mL    OUT:    Indwelling Catheter - Urethral (mL): 2500 mL    VAC (Vacuum Assisted Closure) System (mL): 100 mL  Total OUT: 2600 mL    Total NET: -2600 mL      03 Oct 2022 07:01  -  03 Oct 2022 11:55  --------------------------------------------------------  IN:  Total IN: 0 mL    OUT:    Indwelling Catheter - Urethral (mL): 250 mL  Total OUT: 250 mL    Total NET: -250 mL    PHYSICAL EXAM:  GENERAL: NAD, lying in bed  HEAD:  NC/AT  EYES: EOMI, clear sclera/conjunctiva  ENMT: MMM, hearing intact to voice  NECK: supple, no JVD  NERVOUS SYSTEM: moving extremities, SILT grossly, non-focal  CHEST/LUNG: CTAB, no respiratory distress on RA, speaking in full sentences, no wheeze appreciated  HEART: S1S2 RRR  ABDOMEN: soft, non-tender +BS, +wound Vac in place, no surrounding erythema or drainage noted  EXTREMITIES:  no c/c/e  : +hernandez draining urine  SKIN: +L thigh skin graft donor site, dressing in place, some ooze noted (does not appear bloody)    LABS:    CAPILLARY BLOOD GLUCOSE    BLOOD CULTURE    RADIOLOGY & ADDITIONAL TESTS:    Imaging Personally Reviewed:  [ ] YES     Consultant(s) Notes Reviewed:  Sx, PRS    Care Discussed with Consultants/Other Providers:

## 2022-10-03 NOTE — DISCHARGE NOTE PROVIDER - CARE PROVIDER_API CALL
Mikhail Gallegos)  Plastic Surgery  [address] [address]  Phone: [phone]  Fax: [phone]  Follow Up Time: 1 week

## 2022-10-03 NOTE — PROGRESS NOTE ADULT - ASSESSMENT
89M PMH HTN, cardiomyopathy, GERD, TAVR, L KARIE, whipple s/p STSG to abdomen    Plan  - C/w vac  - C/w home meds  - C/w hernandez  - F/u CM today  - DVT ppx  - Medicine recs  - PT recs: [facility]INA Ellis MD  Plastic and Reconstructive Surgery, PGY2  Available on [employer] Teams

## 2022-10-03 NOTE — DISCHARGE NOTE PROVIDER - NSDCFUADDINST_GEN_ALL_CORE_FT
Please follow up with Dr. Gallegos in 1 week.  Please dress wound with xeroform, telfa, and abd pad secured in place with paper tape.   Please wear abdominal binder at all times except to bathe until seen in the office.   Please follow up with Dr. Gallegos in 1 week.  Please dress wound daily with xeroform, telfa, and abd pad secured in place with paper tape.   Please wear abdominal binder at all times except to bathe until seen in the office.   Please follow up with Dr. Gallegos in 1 week.  Please dress skin graft recipient site daily with xeroform, telfa, and abd pad secured in place with paper tape.   Please apply aquaphor daily to skin graft donor site for 30 days.   Please wear abdominal binder at all times except to bathe until seen in the office.   Please follow up with Dr. Gallegos in 1 week, on 10/14/22.   Please leave abdominal binder and abdominal dressing in place until follow up with Dr. Gallegos. If Dr. Gallegos appointment delayed, please dress skin graft recipient site daily with xeroform, telfa, and abd pad secured in place with paper tape, then cover with abdominal binder (to remain in place at all times except when bathing).   Please apply aquaphor 2x daily to skin graft donor site for 30 days.   Please wear abdominal binder at all times except to bathe until seen in the office.

## 2022-10-03 NOTE — DISCHARGE NOTE PROVIDER - NSDCMRMEDTOKEN_GEN_ALL_CORE_FT
amiodarone 200 mg oral tablet: 1 tab(s) orally once a day AM  Creon 24,000 units oral delayed release capsule: 3 cap(s) orally 3 times a day  DULoxetine 60 mg oral delayed release capsule: 1 cap(s) orally once a day (at bedtime)  Entresto 49 mg-51 mg oral tablet: 1 tab(s) orally 2 times a day  gabapentin 100 mg oral tablet: 1 tab(s) orally 3 times a day, As Needed  Lasix 20 mg oral tablet: 1 tab(s) orally once a day AM  levothyroxine 25 mcg (0.025 mg) oral tablet: 1 tab(s) orally once a day AM  Metoprolol Succinate ER 50 mg oral tablet, extended release: 1 tab(s) orally once a day AM  pantoprazole 40 mg oral delayed release tablet: 1 tab(s) orally once a day AM  QUEtiapine 25 mg oral tablet: 1 tab(s) orally once a day (at bedtime)  tamsulosin 0.4 mg oral capsule: 1 cap(s) orally once a day (at bedtime)  Xanax 0.25 mg oral tablet: 1 tab(s) orally once a day, As Needed   acetaminophen 325 mg oral tablet: 2 tab(s) orally every 6 hours  amiodarone 200 mg oral tablet: 1 tab(s) orally once a day AM  Creon 24,000 units oral delayed release capsule: 3 cap(s) orally 3 times a day  DULoxetine 60 mg oral delayed release capsule: 1 cap(s) orally once a day (at bedtime)  Entresto 49 mg-51 mg oral tablet: 1 tab(s) orally 2 times a day  gabapentin 100 mg oral tablet: 1 tab(s) orally 3 times a day, As Needed  ibuprofen 400 mg oral tablet: 1 tab(s) orally every 6 hours  Lasix 20 mg oral tablet: 1 tab(s) orally once a day AM  levothyroxine 25 mcg (0.025 mg) oral tablet: 1 tab(s) orally once a day AM  Metoprolol Succinate ER 50 mg oral tablet, extended release: 1 tab(s) orally once a day AM  pantoprazole 40 mg oral delayed release tablet: 1 tab(s) orally once a day AM  QUEtiapine 25 mg oral tablet: 1 tab(s) orally once a day (at bedtime)  tamsulosin 0.4 mg oral capsule: 1 cap(s) orally once a day (at bedtime)  Xanax 0.25 mg oral tablet: 1 tab(s) orally once a day, As Needed   acetaminophen 325 mg oral tablet: 2 tab(s) orally every 6 hours  amiodarone 200 mg oral tablet: 1 tab(s) orally once a day AM  Creon 24,000 units oral delayed release capsule: 3 cap(s) orally 3 times a day  DULoxetine 60 mg oral delayed release capsule: 1 cap(s) orally once a day (at bedtime)  Entresto 49 mg-51 mg oral tablet: 1 tab(s) orally 2 times a day  gabapentin 100 mg oral tablet: 1 tab(s) orally 3 times a day, As Needed  ibuprofen 400 mg oral tablet: 1 tab(s) orally every 6 hours  Lasix 20 mg oral tablet: 1 tab(s) orally once a day AM  levothyroxine 25 mcg (0.025 mg) oral tablet: 1 tab(s) orally once a day AM  Metoprolol Succinate ER 50 mg oral tablet, extended release: 1 tab(s) orally once a day AM  Oxaydo 5 mg oral tablet: 1 tab(s) orally every 4 hours MDD:20 mg  pantoprazole 40 mg oral delayed release tablet: 1 tab(s) orally once a day AM  QUEtiapine 25 mg oral tablet: 1 tab(s) orally once a day (at bedtime)  tamsulosin 0.4 mg oral capsule: 1 cap(s) orally once a day (at bedtime)  Xanax 0.25 mg oral tablet: 1 tab(s) orally once a day, As Needed   acetaminophen 325 mg oral tablet: 2 tab(s) orally every 6 hours  amiodarone 200 mg oral tablet: 1 tab(s) orally once a day AM  Creon 24,000 units oral delayed release capsule: 3 cap(s) orally 3 times a day  DULoxetine 60 mg oral delayed release capsule: 1 cap(s) orally once a day (at bedtime)  Entresto 49 mg-51 mg oral tablet: 1 tab(s) orally 2 times a day  gabapentin 100 mg oral tablet: 1 tab(s) orally 3 times a day, As Needed  ibuprofen 400 mg oral tablet: 1 tab(s) orally every 6 hours  Lasix 20 mg oral tablet: 1 tab(s) orally once a day AM  levothyroxine 25 mcg (0.025 mg) oral tablet: 1 tab(s) orally once a day AM  Metoprolol Succinate ER 50 mg oral tablet, extended release: 1 tab(s) orally once a day AM  Narcan 4 mg/0.1 mL nasal spray: 1 spray(s) intranasally once, As Needed   oxyCODONE 5 mg oral tablet: 1 tab(s) orally every 6 hours, As Needed MDD:4 tablets  pantoprazole 40 mg oral delayed release tablet: 1 tab(s) orally once a day AM  petrolatum topical ointment: 1 application topically once a day  QUEtiapine 25 mg oral tablet: 1 tab(s) orally once a day (at bedtime)  tamsulosin 0.4 mg oral capsule: 1 cap(s) orally once a day (at bedtime)  Xanax 0.25 mg oral tablet: 1 tab(s) orally once a day, As Needed

## 2022-10-03 NOTE — DISCHARGE NOTE PROVIDER - NSDCFUSCHEDAPPT_GEN_ALL_CORE_FT
[facility]  CARDIOLOGY [address] [facility] [address]  Scheduled Appointment: 10/17/2022    Sourav Gu  [facility]  CARDIOLOGY [address] [facility] [address] [address]  Scheduled Appointment: 11/01/2022    [facility]  ELECTROPH [address]  Scheduled Appointment: 11/11/2022     [facility]  ELECTROPH [address]  Scheduled Appointment: 11/11/2022    [facility]  CARDIOLOGY [address]  Scheduled Appointment: 11/16/2022

## 2022-10-03 NOTE — DISCHARGE NOTE PROVIDER - NSDCCPCAREPLAN_GEN_ALL_CORE_FT
PRINCIPAL DISCHARGE DIAGNOSIS  Diagnosis: Open wound of abdominal wall  Assessment and Plan of Treatment:        PRINCIPAL DISCHARGE DIAGNOSIS  Diagnosis: Open wound of abdominal wall  Assessment and Plan of Treatment: Follow up: Please call and make an appointment with Dr. Gallegos for 1 week after discharge. Also, please call and make an appointment with your primary care physician as per your usual schedule.   Activity: May return to normal activities as tolerated.   Diet: May continue regular diet.  Medications: Please take all home medications as prescribed by your primary care doctor. Pain medication has been prescribed for you. Please follow Tylenol and ibuprofen instructions as written on label.   Wound Care:   Abdominal wound: Please apply daily xeroform to skin graft. Cover with gauze and secure. Please do not rub on site. Sponge bath with care to avoid skin graft site.   Left upper thigh: Please leave to air dry. May let water run over site  Do not shower. Please use sponge bath until next appointment. No submerging in tub bath.   If confusion, altered mental status, fever, chest pain, shortness of breath, new or worsening abdominal pain, vomiting, change or worsening of medical status, please come back to the emergency room, and in case of emergency call 911.         PRINCIPAL DISCHARGE DIAGNOSIS  Diagnosis: Open wound of abdominal wall  Assessment and Plan of Treatment: Follow up: Please call and make an appointment with Dr. Gallegos for 1 week after discharge. Also, please call and make an appointment with your primary care physician as per your usual schedule.   Activity: May return to normal activities as tolerated.   Diet: May continue regular diet.  Medications: Please take all home medications as prescribed by your primary care doctor. Pain medication has been prescribed for you. Please follow Tylenol and ibuprofen instructions as written on label.   Wound Care:   Abdominal wound: Please leave dressing on abdomen in place for one week.   Please do not rub on site. Sponge bath with care to avoid woudn site. Maintain abdominal binder on at all times. May take off when cleansing body.  Left upper thigh: Please apply Aquaphor to the left upper thigh  Do not shower. Please use sponge bath until next appointment. No submerging in tub bath.   If confusion, altered mental status, fever, chest pain, shortness of breath, new or worsening abdominal pain, vomiting, change or worsening of medical status, please come back to the emergency room, and in case of emergency call 911.         PRINCIPAL DISCHARGE DIAGNOSIS  Diagnosis: Open wound of abdominal wall  Assessment and Plan of Treatment: Follow up: Please call and make an appointment with Dr. Gallegos for 1 week after discharge, 10/14/22. Also, please call and make an appointment with your primary care physician as per your usual schedule.   Activity: May return to normal activities as tolerated.   Diet: May continue regular diet.  Medications: Please take all home medications as prescribed by your primary care doctor. Pain medication has been prescribed for you. Please follow Tylenol and ibuprofen instructions as written on label.   Wound Care:   Abdominal wound: Please leave dressing on abdomen in place for one week.   Please do not rub on site. Sponge bath with care to avoid woudn site. Maintain abdominal binder on at all times. May take off when cleansing body.  Left upper thigh: Please apply Aquaphor to the left upper thigh 2x day.   Do not shower. Please use sponge bath until next appointment. No submerging in tub bath.   If confusion, altered mental status, fever, chest pain, shortness of breath, new or worsening abdominal pain, vomiting, change or worsening of medical status, please come back to the emergency room, and in case of emergency call 911.         PRINCIPAL DISCHARGE DIAGNOSIS  Diagnosis: Open wound of abdominal wall  Assessment and Plan of Treatment: Follow up: Please call and make an appointment with Dr. Gallegos for 1 week after discharge, on 10/14/22. Also, please call and make an appointment with your primary care physician as per your usual schedule.   Activity: May return to normal activities as tolerated.   Diet: May continue regular diet.  Medications: Please take all home medications as prescribed by your primary care doctor. Pain medication has been prescribed for you. Please follow Tylenol and ibuprofen instructions as written on label.   Wound Care:   Abdominal wound: Please leave dressing on abdomen in place for one week, until seen by Dr. Gallegos. If appointment with Dr. Gallegos is delayed, please replace dressing daily with xeroform, telfa, ABD and tape starting on 10/14; cover with abdominal binder.  Please do not rub on site. Sponge bath with care to avoid woudn site. Maintain abdominal binder on at all times. May take off when cleansing body.  Left upper thigh: Please apply Aquaphor to the left upper thigh 2x day.   Do not shower. Please use sponge bath until next appointment. No submerging in tub bath.   If confusion, altered mental status, fever, chest pain, shortness of breath, new or worsening abdominal pain, vomiting, change or worsening of medical status, please come back to the emergency room, and in case of emergency call 911.

## 2022-10-04 NOTE — PROGRESS NOTE ADULT - ASSESSMENT
80 yo M w/ HFrEF (EF 30-35%), VT, s/p ACID, AS s/p TAVR, CAD s/p stents, GERD, hypothyroid, anxiety, pancreatic ca s/p whipple 10/26/21, chemo and xrt with non-healing abdominal wound treated w/ HBOT and skin graft 7/8/22, admitted for second skin graft, s/p procedure 9/29 and wound vac placement.     # open wound of abdominal wall, s/p STSG to abdomen, wound vac placement on 9/29  - complaining of some pain at L thigh skin graft donor site and some abdominal discomfort today  - has abdominal wound vac in place  - c/w pain management, wound care and post-op management as per PRS/Surgery  - bowel regimen to prevent opioid induced constipation  - would be judicious re: use of NSAIDS in elderly patients, on ppi for GERD    # HFrEF (EF 30-35%), VT, s/p AICD, AS, s/p TAVR, CAD s/p stents  - denies chest pain, clinically euvolemic on exam, respiratory status stable on RA  - c/w amiodarone for hx VT  - c/w lasix, toprol, entresto for chronic HFrEF  - current SBP in acceptable range, consider addition of hold parameters for BP meds    # Pancreatic cancer  - c/w creon, otpt f/u    # Hypothyroid  - c/w synthroid    # Anxiety  - on seroquel, cymbalta, with xanax prn,     # BPH  - c/w flomax  - undergoing TOV, monitor UOP    Need for prophylactic measure  VTE ppx: lovenox as per PRS/Sx    Dispo: anticipate [facility]

## 2022-10-04 NOTE — PROGRESS NOTE ADULT - SUBJECTIVE AND OBJECTIVE BOX
Plastic Surgery Progress Note (pg [facility]: [id number], [facility]: [phone])    SUBJECTIVE  The patient was seen and examined this AM. He was resting comfortably in bed. No acute events overnight. Afebrile and VSS. Appropriate urine output over 24 hours. He was complaining of pain to the donor site, left upper thigh.    OBJECTIVE  ___________________________________________________  VITAL SIGNS / I&O's   Vital Signs Last 24 Hrs  T(C): 36.6 (04 Oct 2022 05:12), Max: 36.9 (03 Oct 2022 13:10)  T(F): 97.9 (04 Oct 2022 05:12), Max: 98.4 (03 Oct 2022 13:10)  HR: 68 (04 Oct 2022 05:12) (57 - 77)  BP: 115/58 (04 Oct 2022 05:12) (91/50 - 115/58)  BP(mean): --  RR: 17 (04 Oct 2022 05:12) (16 - 17)  SpO2: 95% (04 Oct 2022 05:12) (95% - 100%)    Parameters below as of 04 Oct 2022 05:12  Patient On (Oxygen Delivery Method): room air          03 Oct 2022 07:01  -  04 Oct 2022 07:00  --------------------------------------------------------  IN:  Total IN: 0 mL    OUT:    Indwelling Catheter - Urethral (mL): 1275 mL    VAC (Vacuum Assisted Closure) System (mL): 0 mL  Total OUT: 1275 mL    Total NET: -1275 mL        ___________________________________________________  PHYSICAL EXAM    -- CONSTITUTIONAL: NAD, lying in bed  -- NEURO: Awake, alert  -- PULM: Non-labored respirations  -- ABDOMEN: vac in place, holding suction  abdomen soft  -- LLE: skin graft dressing in place, with appropriate ooze.  ___________________________________________________  LABS            CAPILLARY BLOOD GLUCOSE              ___________________________________________________  MICRO  Recent Cultures:    ___________________________________________________  MEDICATIONS  (STANDING):  acetaminophen     Tablet .. 650 milliGRAM(s) Oral every 6 hours  aMIOdarone    Tablet 200 milliGRAM(s) Oral daily  DULoxetine 60 milliGRAM(s) Oral daily  enoxaparin Injectable 40 milliGRAM(s) SubCutaneous every 24 hours  furosemide    Tablet 20 milliGRAM(s) Oral daily  ibuprofen  Tablet. 400 milliGRAM(s) Oral every 6 hours  influenza  Vaccine (HIGH DOSE) 0.7 milliLiter(s) IntraMuscular once  levothyroxine 25 MICROGram(s) Oral daily  metoprolol succinate ER 50 milliGRAM(s) Oral daily  pancrelipase  (CREON 24,000 Lipase Units) 3 Capsule(s) Oral three times a day with meals  pantoprazole    Tablet 40 milliGRAM(s) Oral before breakfast  QUEtiapine 25 milliGRAM(s) Oral at bedtime  sacubitril 49 mG/valsartan 51 mG 1 Tablet(s) Oral two times a day  tamsulosin 0.4 milliGRAM(s) Oral at bedtime    MEDICATIONS  (PRN):  ALPRAZolam 0.25 milliGRAM(s) Oral daily PRN per pt request  artificial tears (preservative free) Ophthalmic Solution 1 Drop(s) Both EYES every 2 hours PRN Dry Eyes  benzocaine 15 mG/menthol 3.6 mG Lozenge 1 Lozenge Oral every 3 hours PRN Sore Throat  calcium carbonate    500 mG (Tums) Chewable 1 Tablet(s) Chew every 4 hours PRN Indigestion  gabapentin 100 milliGRAM(s) Oral three times a day PRN pain  melatonin 3 milliGRAM(s) Oral at bedtime PRN Insomnia  oxyCODONE    IR 5 milliGRAM(s) Oral every 4 hours PRN Moderate Pain (4 - 6)  oxyCODONE    IR 10 milliGRAM(s) Oral every 4 hours PRN Severe Pain (7 - 10)  senna 2 Tablet(s) Oral at bedtime PRN Constipation

## 2022-10-04 NOTE — PROGRESS NOTE ADULT - SUBJECTIVE AND OBJECTIVE BOX
[facility] Medicine  Pager [phone]    Patient is a 81y old  Male who presents with a chief complaint of Skin graft to abdomen (03 Oct 2022 08:55)      INTERVAL HPI/OVERNIGHT EVENTS:    MEDICATIONS  (STANDING):  acetaminophen     Tablet .. 650 milliGRAM(s) Oral every 6 hours  aMIOdarone    Tablet 200 milliGRAM(s) Oral daily  DULoxetine 60 milliGRAM(s) Oral daily  enoxaparin Injectable 40 milliGRAM(s) SubCutaneous every 24 hours  furosemide    Tablet 20 milliGRAM(s) Oral daily  ibuprofen  Tablet. 400 milliGRAM(s) Oral every 6 hours  influenza  Vaccine (HIGH DOSE) 0.7 milliLiter(s) IntraMuscular once  levothyroxine 25 MICROGram(s) Oral daily  metoprolol succinate ER 50 milliGRAM(s) Oral daily  pancrelipase  (CREON 24,000 Lipase Units) 3 Capsule(s) Oral three times a day with meals  pantoprazole    Tablet 40 milliGRAM(s) Oral before breakfast  QUEtiapine 25 milliGRAM(s) Oral at bedtime  sacubitril 49 mG/valsartan 51 mG 1 Tablet(s) Oral two times a day  tamsulosin 0.4 milliGRAM(s) Oral at bedtime    MEDICATIONS  (PRN):  ALPRAZolam 0.25 milliGRAM(s) Oral daily PRN per pt request  artificial tears (preservative free) Ophthalmic Solution 1 Drop(s) Both EYES every 2 hours PRN Dry Eyes  benzocaine 15 mG/menthol 3.6 mG Lozenge 1 Lozenge Oral every 3 hours PRN Sore Throat  calcium carbonate    500 mG (Tums) Chewable 1 Tablet(s) Chew every 4 hours PRN Indigestion  gabapentin 100 milliGRAM(s) Oral three times a day PRN pain  melatonin 3 milliGRAM(s) Oral at bedtime PRN Insomnia  oxyCODONE    IR 5 milliGRAM(s) Oral every 4 hours PRN Moderate Pain (4 - 6)  oxyCODONE    IR 10 milliGRAM(s) Oral every 4 hours PRN Severe Pain (7 - 10)  senna 2 Tablet(s) Oral at bedtime PRN Constipation      Allergies    No Known Allergies    Intolerances        REVIEW OF SYSTEMS:  Please see interval HPI:    Vital Signs Last 24 Hrs  T(C): 36.5 (04 Oct 2022 10:04), Max: 36.9 (03 Oct 2022 13:10)  T(F): 97.7 (04 Oct 2022 10:04), Max: 98.4 (03 Oct 2022 13:10)  HR: 62 (04 Oct 2022 10:04) (57 - 71)  BP: 109/47 (04 Oct 2022 10:04) (91/50 - 115/58)  BP(mean): --  RR: 17 (04 Oct 2022 10:04) (16 - 17)  SpO2: 95% (04 Oct 2022 10:04) (95% - 100%)    Parameters below as of 04 Oct 2022 10:04  Patient On (Oxygen Delivery Method): room air      I&O's Detail    03 Oct 2022 07:01  -  04 Oct 2022 07:00  --------------------------------------------------------  IN:  Total IN: 0 mL    OUT:    Indwelling Catheter - Urethral (mL): 1275 mL    VAC (Vacuum Assisted Closure) System (mL): 0 mL  Total OUT: 1275 mL    Total NET: -1275 mL      04 Oct 2022 07:01  -  04 Oct 2022 11:30  --------------------------------------------------------  IN:  Total IN: 0 mL    OUT:    Indwelling Catheter - Urethral (mL): 300 mL    VAC (Vacuum Assisted Closure) System (mL): 10 mL  Total OUT: 310 mL    Total NET: -310 mL            PHYSICAL EXAM:  GENERAL:   HEAD:    EYES:   ENMT:   NECK:   NERVOUS SYSTEM:    CHEST/LUNG:   HEART:   ABDOMEN:   EXTREMITIES:    LYMPH:   SKIN:     LABS:            CAPILLARY BLOOD GLUCOSE        BLOOD CULTURE    RADIOLOGY & ADDITIONAL TESTS:    Imaging Personally Reviewed:  [ ] YES     Consultant(s) Notes Reviewed:      Care Discussed with Consultants/Other Providers: [facility] Medicine  Pager [phone]    Patient is a 81y old  Male who presents with a chief complaint of Skin graft to abdomen (03 Oct 2022 08:55)      INTERVAL HPI/OVERNIGHT EVENTS:  Sitting in chair, reporting abdominal discomfort and L thigh discomfort, thinks recently received pain medication, will see how it goes. Hernandez was removed for TOV, patient happy that hernandez is out. No SOB. Aware of plan for rehab placement.     MEDICATIONS  (STANDING):  acetaminophen     Tablet .. 650 milliGRAM(s) Oral every 6 hours  aMIOdarone    Tablet 200 milliGRAM(s) Oral daily  DULoxetine 60 milliGRAM(s) Oral daily  enoxaparin Injectable 40 milliGRAM(s) SubCutaneous every 24 hours  furosemide    Tablet 20 milliGRAM(s) Oral daily  ibuprofen  Tablet. 400 milliGRAM(s) Oral every 6 hours  influenza  Vaccine (HIGH DOSE) 0.7 milliLiter(s) IntraMuscular once  levothyroxine 25 MICROGram(s) Oral daily  metoprolol succinate ER 50 milliGRAM(s) Oral daily  pancrelipase  (CREON 24,000 Lipase Units) 3 Capsule(s) Oral three times a day with meals  pantoprazole    Tablet 40 milliGRAM(s) Oral before breakfast  QUEtiapine 25 milliGRAM(s) Oral at bedtime  sacubitril 49 mG/valsartan 51 mG 1 Tablet(s) Oral two times a day  tamsulosin 0.4 milliGRAM(s) Oral at bedtime    MEDICATIONS  (PRN):  ALPRAZolam 0.25 milliGRAM(s) Oral daily PRN per pt request  artificial tears (preservative free) Ophthalmic Solution 1 Drop(s) Both EYES every 2 hours PRN Dry Eyes  benzocaine 15 mG/menthol 3.6 mG Lozenge 1 Lozenge Oral every 3 hours PRN Sore Throat  calcium carbonate    500 mG (Tums) Chewable 1 Tablet(s) Chew every 4 hours PRN Indigestion  gabapentin 100 milliGRAM(s) Oral three times a day PRN pain  melatonin 3 milliGRAM(s) Oral at bedtime PRN Insomnia  oxyCODONE    IR 5 milliGRAM(s) Oral every 4 hours PRN Moderate Pain (4 - 6)  oxyCODONE    IR 10 milliGRAM(s) Oral every 4 hours PRN Severe Pain (7 - 10)  senna 2 Tablet(s) Oral at bedtime PRN Constipation      Allergies  No Known Allergies    Intolerances    REVIEW OF SYSTEMS:  Please see interval HPI:    Vital Signs Last 24 Hrs  T(C): 36.5 (04 Oct 2022 10:04), Max: 36.9 (03 Oct 2022 13:10)  T(F): 97.7 (04 Oct 2022 10:04), Max: 98.4 (03 Oct 2022 13:10)  HR: 62 (04 Oct 2022 10:04) (57 - 71)  BP: 109/47 (04 Oct 2022 10:04) (91/50 - 115/58)  RR: 17 (04 Oct 2022 10:04) (16 - 17)  SpO2: 95% (04 Oct 2022 10:04) (95% - 100%)    Parameters below as of 04 Oct 2022 10:04  Patient On (Oxygen Delivery Method): room air      I&O's Detail    03 Oct 2022 07:01  -  04 Oct 2022 07:00  --------------------------------------------------------  IN:  Total IN: 0 mL    OUT:    Indwelling Catheter - Urethral (mL): 1275 mL    VAC (Vacuum Assisted Closure) System (mL): 0 mL  Total OUT: 1275 mL    Total NET: -1275 mL      04 Oct 2022 07:01  -  04 Oct 2022 11:30  --------------------------------------------------------  IN:  Total IN: 0 mL    OUT:    Indwelling Catheter - Urethral (mL): 300 mL    VAC (Vacuum Assisted Closure) System (mL): 10 mL  Total OUT: 310 mL    Total NET: -310 mL            PHYSICAL EXAM:  GENERAL: NAD, sitting in chair  HEAD:  NC/AT  EYES: EOMI, clear sclera/conjunctiva  ENMT: MMM, hearing intact to voice  NECK: supple, no JVD  NERVOUS SYSTEM: moving extremities, SILT grossly, non-focal  CHEST/LUNG:  no respiratory distress on RA, speaking in full sentences   ABDOMEN: +wound Vac in place  EXTREMITIES:  no c/c/e  SKIN: +L thigh skin graft donor site, dressing in place    LABS:    CAPILLARY BLOOD GLUCOSE        BLOOD CULTURE    RADIOLOGY & ADDITIONAL TESTS:    Imaging Personally Reviewed:  [ ] YES     Consultant(s) Notes Reviewed:  PRS    Care Discussed with Consultants/Other Providers:

## 2022-10-04 NOTE — PROGRESS NOTE ADULT - ASSESSMENT
A: 89M PMH HTN, cardiomyopathy, GERD, TAVR, L KARIE, whipple s/p STSG to abdomen    Plan  - C/w vac  - C/w home meds  - Discharge david this AM and TOV.   - F/u CM today  - DVT ppx  - Medicine recs  - PT recs: TRINA Medley MD PGY-1  General Surgery   Available on Teams

## 2022-10-05 NOTE — PROGRESS NOTE ADULT - ASSESSMENT
89M PMH HTN, cardiomyopathy, GERD, TAVR, L KARIE, whipple s/p STSG to abdomen    Plan  - C/w vac  - C/w home meds  - straight cath  - F/u CM today  - DVT ppx  - Medicine recs  - PT recs: TRINA Ellis MD  Plastic and Reconstructive Surgery, PGY2  Available on Microsoft Teams  [facility]: [id number], [facility]: [phone]

## 2022-10-05 NOTE — DIETITIAN INITIAL EVALUATION ADULT - ADD RECOMMEND
1) Recommend DASH/TLC diet  2) Monitor weights, labs, BM's, skin integrity, p.o. intake.   3) RD available prn

## 2022-10-05 NOTE — PROGRESS NOTE ADULT - SUBJECTIVE AND OBJECTIVE BOX
[facility] Medicine  Pager [phone]    Patient is a 81y old  Male who presents with a chief complaint of Skin graft to abdomen (03 Oct 2022 08:55)      INTERVAL HPI/OVERNIGHT EVENTS:    MEDICATIONS  (STANDING):  acetaminophen     Tablet .. 650 milliGRAM(s) Oral every 6 hours  aMIOdarone    Tablet 200 milliGRAM(s) Oral daily  DULoxetine 60 milliGRAM(s) Oral daily  enoxaparin Injectable 40 milliGRAM(s) SubCutaneous every 24 hours  furosemide    Tablet 20 milliGRAM(s) Oral daily  ibuprofen  Tablet. 400 milliGRAM(s) Oral every 6 hours  influenza  Vaccine (HIGH DOSE) 0.7 milliLiter(s) IntraMuscular once  levothyroxine 25 MICROGram(s) Oral daily  metoprolol succinate ER 50 milliGRAM(s) Oral daily  pancrelipase  (CREON 24,000 Lipase Units) 3 Capsule(s) Oral three times a day with meals  pantoprazole    Tablet 40 milliGRAM(s) Oral before breakfast  QUEtiapine 25 milliGRAM(s) Oral at bedtime  sacubitril 49 mG/valsartan 51 mG 1 Tablet(s) Oral two times a day  tamsulosin 0.4 milliGRAM(s) Oral at bedtime    MEDICATIONS  (PRN):  ALPRAZolam 0.25 milliGRAM(s) Oral daily PRN per pt request  artificial tears (preservative free) Ophthalmic Solution 1 Drop(s) Both EYES every 2 hours PRN Dry Eyes  benzocaine 15 mG/menthol 3.6 mG Lozenge 1 Lozenge Oral every 3 hours PRN Sore Throat  calcium carbonate    500 mG (Tums) Chewable 1 Tablet(s) Chew every 4 hours PRN Indigestion  gabapentin 100 milliGRAM(s) Oral three times a day PRN pain  melatonin 3 milliGRAM(s) Oral at bedtime PRN Insomnia  oxyCODONE    IR 10 milliGRAM(s) Oral every 4 hours PRN Severe Pain (7 - 10)  oxyCODONE    IR 5 milliGRAM(s) Oral every 4 hours PRN Moderate Pain (4 - 6)  senna 2 Tablet(s) Oral at bedtime PRN Constipation      Allergies    No Known Allergies    Intolerances        REVIEW OF SYSTEMS:  Please see interval HPI:    Vital Signs Last 24 Hrs  T(C): 36.4 (05 Oct 2022 09:46), Max: 36.9 (04 Oct 2022 21:59)  T(F): 97.6 (05 Oct 2022 09:46), Max: 98.4 (04 Oct 2022 21:59)  HR: 68 (05 Oct 2022 09:46) (63 - 71)  BP: 93/46 (05 Oct 2022 09:46) (93/46 - 101/80)  BP(mean): --  RR: 18 (05 Oct 2022 09:46) (17 - 18)  SpO2: 95% (05 Oct 2022 09:46) (95% - 99%)    Parameters below as of 05 Oct 2022 09:46  Patient On (Oxygen Delivery Method): room air      I&O's Detail    04 Oct 2022 07:01  -  05 Oct 2022 07:00  --------------------------------------------------------  IN:  Total IN: 0 mL    OUT:    Indwelling Catheter - Urethral (mL): 300 mL    VAC (Vacuum Assisted Closure) System (mL): 360 mL    Voided (mL): 200 mL  Total OUT: 860 mL    Total NET: -860 mL      05 Oct 2022 07:01  -  05 Oct 2022 12:02  --------------------------------------------------------  IN:  Total IN: 0 mL    OUT:    Intermittent Catheterization - Urethral (mL): 650 mL    VAC (Vacuum Assisted Closure) System (mL): 50 mL  Total OUT: 700 mL    Total NET: -700 mL            PHYSICAL EXAM:  GENERAL:   HEAD:    EYES:   ENMT:   NECK:   NERVOUS SYSTEM:    CHEST/LUNG:   HEART:   ABDOMEN:   EXTREMITIES:    LYMPH:   SKIN:     LABS:            CAPILLARY BLOOD GLUCOSE        BLOOD CULTURE    RADIOLOGY & ADDITIONAL TESTS:    Imaging Personally Reviewed:  [ ] YES     Consultant(s) Notes Reviewed:      Care Discussed with Consultants/Other Providers: [facility] Medicine  Pager [phone]    Patient is a 81y old  Male who presents with a chief complaint of Skin graft to abdomen (03 Oct 2022 08:55)      INTERVAL HPI/OVERNIGHT EVENTS:  Sitting in bed, eating salmon. When asked how is it, replies "it's salmon". Still with abdominal and L thigh discomfort (skin graft site), but reports pain meds help. Denies dizziness/lightheadedness. No other complaints at this time. States is urinating ok (undergoing TOV), discussed potential need for bladder scan/assess for retention.     MEDICATIONS  (STANDING):  acetaminophen     Tablet .. 650 milliGRAM(s) Oral every 6 hours  aMIOdarone    Tablet 200 milliGRAM(s) Oral daily  DULoxetine 60 milliGRAM(s) Oral daily  enoxaparin Injectable 40 milliGRAM(s) SubCutaneous every 24 hours  furosemide    Tablet 20 milliGRAM(s) Oral daily  ibuprofen  Tablet. 400 milliGRAM(s) Oral every 6 hours  influenza  Vaccine (HIGH DOSE) 0.7 milliLiter(s) IntraMuscular once  levothyroxine 25 MICROGram(s) Oral daily  metoprolol succinate ER 50 milliGRAM(s) Oral daily  pancrelipase  (CREON 24,000 Lipase Units) 3 Capsule(s) Oral three times a day with meals  pantoprazole    Tablet 40 milliGRAM(s) Oral before breakfast  QUEtiapine 25 milliGRAM(s) Oral at bedtime  sacubitril 49 mG/valsartan 51 mG 1 Tablet(s) Oral two times a day  tamsulosin 0.4 milliGRAM(s) Oral at bedtime    MEDICATIONS  (PRN):  ALPRAZolam 0.25 milliGRAM(s) Oral daily PRN per pt request  artificial tears (preservative free) Ophthalmic Solution 1 Drop(s) Both EYES every 2 hours PRN Dry Eyes  benzocaine 15 mG/menthol 3.6 mG Lozenge 1 Lozenge Oral every 3 hours PRN Sore Throat  calcium carbonate    500 mG (Tums) Chewable 1 Tablet(s) Chew every 4 hours PRN Indigestion  gabapentin 100 milliGRAM(s) Oral three times a day PRN pain  melatonin 3 milliGRAM(s) Oral at bedtime PRN Insomnia  oxyCODONE    IR 10 milliGRAM(s) Oral every 4 hours PRN Severe Pain (7 - 10)  oxyCODONE    IR 5 milliGRAM(s) Oral every 4 hours PRN Moderate Pain (4 - 6)  senna 2 Tablet(s) Oral at bedtime PRN Constipation      Allergies  No Known Allergies    Intolerances        REVIEW OF SYSTEMS:  Please see interval HPI:    Vital Signs Last 24 Hrs  T(C): 36.4 (05 Oct 2022 09:46), Max: 36.9 (04 Oct 2022 21:59)  T(F): 97.6 (05 Oct 2022 09:46), Max: 98.4 (04 Oct 2022 21:59)  HR: 68 (05 Oct 2022 09:46) (63 - 71)  BP: 93/46 (05 Oct 2022 09:46) (93/46 - 101/80)  RR: 18 (05 Oct 2022 09:46) (17 - 18)  SpO2: 95% (05 Oct 2022 09:46) (95% - 99%)    Parameters below as of 05 Oct 2022 09:46  Patient On (Oxygen Delivery Method): room air      I&O's Detail    04 Oct 2022 07:01  -  05 Oct 2022 07:00  --------------------------------------------------------  IN:  Total IN: 0 mL    OUT:    Indwelling Catheter - Urethral (mL): 300 mL    VAC (Vacuum Assisted Closure) System (mL): 360 mL    Voided (mL): 200 mL  Total OUT: 860 mL    Total NET: -860 mL      05 Oct 2022 07:01  -  05 Oct 2022 12:02  --------------------------------------------------------  IN:  Total IN: 0 mL    OUT:    Intermittent Catheterization - Urethral (mL): 650 mL    VAC (Vacuum Assisted Closure) System (mL): 50 mL  Total OUT: 700 mL    Total NET: -700 mL      PHYSICAL EXAM:  GENERAL: NAD, sitting in bed, eating lunch (salmon)  HEAD:  NC/AT  EYES: EOMI, clear sclera/conjunctiva  ENMT: MMM, hearing intact to voice  NECK: supple, no JVD  NERVOUS SYSTEM:  moving all extremities, SILT grossly, non-focal  CHEST/LUNG: CTAB, no wheeze appreciated, speaking in full sentences, no respiratory distress on RA  HEART: S1S2 RRR  ABDOMEN: soft, non-tender +wound vac in place  EXTREMITIES:  no c/c/e  SKIN: + L thigh skin graft site    LABS:    CAPILLARY BLOOD GLUCOSE    BLOOD CULTURE    RADIOLOGY & ADDITIONAL TESTS:    Imaging Personally Reviewed:  [ ] YES     Consultant(s) Notes Reviewed:  PRS    Care Discussed with Consultants/Other Providers:

## 2022-10-05 NOTE — DIETITIAN INITIAL EVALUATION ADULT - OTHER INFO
Medical course: Per chart 81 year old male w/ HFrEF (EF 30-35%), VT, s/p ACID, AS s/p TAVR, CAD s/p stents, GERD, hypothyroid, anxiety, pancreatic ca s/p whipple 10/26/21, chemo and xrt with non-healing abdominal wound treated w/ HBOT and skin graft 7/8/22, admitted for second skin graft, s/p procedure 9/29 and wound vac placement.     Nutrition interview: No recent episodes of nausea, vomiting, diarrhea or constipation, last BM noted on 10/4 per RN flowsheets. Denies any chewing/swallowing difficulties. No food allergies. Stated UBW: 208#, denies any changes in weight or appetite. Food preferences explored and noted. Intake is % per RN flowsheets and per pt. Feeding skills: independent. Patient with history of pancreatic cancer and h/o whipple procedure, takes creon 3x daily with meals to aid in digestion.

## 2022-10-05 NOTE — DIETITIAN INITIAL EVALUATION ADULT - LITERATURE/VIDEOS GIVEN
Heart failure nutrition therapy provided. Discussed daily weights, nutrition label reading, no added salt to foods, limiting fluid intake, and limiting eating out.

## 2022-10-05 NOTE — PROGRESS NOTE ADULT - ASSESSMENT
80 yo M w/ HFrEF (EF 30-35%), VT, s/p ACID, AS s/p TAVR, CAD s/p stents, GERD, hypothyroid, anxiety, pancreatic ca s/p whipple 10/26/21, chemo and xrt with non-healing abdominal wound treated w/ HBOT and skin graft 7/8/22, admitted for second skin graft, s/p procedure 9/29 and wound vac placement.     # open wound of abdominal wall, s/p STSG to abdomen, wound vac placement on 9/29  - reports pain medications helping to manage abdominal and skin graft pain  - has abdominal wound vac in place  - c/w pain management, wound care and post-op management as per PRS/Surgery  - bowel regimen to prevent opioid induced constipation  - would be judicious re: use of NSAIDS in elderly patients, on ppi for GERD    # HFrEF (EF 30-35%), VT, s/p AICD, AS, s/p TAVR, CAD s/p stents  - denies chest pain, clinically euvolemic on exam, respiratory status stable on RA  - c/w amiodarone for hx VT  - c/w lasix, toprol, entresto for chronic HFrEF  - consider periodic bloodwork to monitor renal function/lytes as is on diuretic  - continue hold parameters on medications, monitor BP, appears asymptomatic    # Pancreatic cancer  - c/w creon, otpt f/u    # Hypothyroid  - c/w synthroid    # Anxiety  - on seroquel, cymbalta, with xanax prn,     # BPH  - c/w flomax  - currently undergoing TOV, monitor UOP    Need for prophylactic measure  VTE ppx: lovenox as per PRS/Sx    Dispo: anticipate TRINA

## 2022-10-05 NOTE — DIETITIAN INITIAL EVALUATION ADULT - ORAL INTAKE PTA/DIET HISTORY
PTA patient with good appetite. Takes vitamin C, vitamin D3, and B6. Patient does not follow any diet at home, often eats take out.

## 2022-10-05 NOTE — PROGRESS NOTE ADULT - SUBJECTIVE AND OBJECTIVE BOX
Plastic Surgery Progress Note (pg [facility]: [id number], [facility]: [phone])    SUBJECTIVE  The patient was seen and examined. No acute events overnight.    OBJECTIVE  ___________________________________________________  VITAL SIGNS / I&O's   Vital Signs Last 24 Hrs  T(C): 36.4 (05 Oct 2022 06:04), Max: 36.9 (04 Oct 2022 21:59)  T(F): 97.5 (05 Oct 2022 06:04), Max: 98.4 (04 Oct 2022 21:59)  HR: 71 (05 Oct 2022 06:04) (62 - 71)  BP: 96/48 (05 Oct 2022 06:04) (96/48 - 109/47)  BP(mean): --  RR: 18 (05 Oct 2022 06:04) (17 - 18)  SpO2: 96% (05 Oct 2022 06:04) (95% - 99%)    Parameters below as of 05 Oct 2022 06:04  Patient On (Oxygen Delivery Method): room air          04 Oct 2022 07:01  -  05 Oct 2022 07:00  --------------------------------------------------------  IN:  Total IN: 0 mL    OUT:    Indwelling Catheter - Urethral (mL): 300 mL    VAC (Vacuum Assisted Closure) System (mL): 360 mL    Voided (mL): 200 mL  Total OUT: 860 mL    Total NET: -860 mL      05 Oct 2022 07:01  -  05 Oct 2022 07:26  --------------------------------------------------------  IN:  Total IN: 0 mL    OUT:    Intermittent Catheterization - Urethral (mL): 650 mL  Total OUT: 650 mL    Total NET: -650 mL        ___________________________________________________  PHYSICAL EXAM    -- CONSTITUTIONAL: NAD, lying in bed  -- NEURO: Awake, alert  -- PULM: Non-labored respirations  -- ABDOMEN: vac in place, holding suction  abdomen soft  -- LLE: skin graft dressing in place, with appropriate ooze.  ___________________________________________________  LABS            CAPILLARY BLOOD GLUCOSE              ___________________________________________________  MICRO  Recent Cultures:    ___________________________________________________  MEDICATIONS  (STANDING):  acetaminophen     Tablet .. 650 milliGRAM(s) Oral every 6 hours  aMIOdarone    Tablet 200 milliGRAM(s) Oral daily  DULoxetine 60 milliGRAM(s) Oral daily  enoxaparin Injectable 40 milliGRAM(s) SubCutaneous every 24 hours  furosemide    Tablet 20 milliGRAM(s) Oral daily  ibuprofen  Tablet. 400 milliGRAM(s) Oral every 6 hours  influenza  Vaccine (HIGH DOSE) 0.7 milliLiter(s) IntraMuscular once  levothyroxine 25 MICROGram(s) Oral daily  metoprolol succinate ER 50 milliGRAM(s) Oral daily  pancrelipase  (CREON 24,000 Lipase Units) 3 Capsule(s) Oral three times a day with meals  pantoprazole    Tablet 40 milliGRAM(s) Oral before breakfast  QUEtiapine 25 milliGRAM(s) Oral at bedtime  sacubitril 49 mG/valsartan 51 mG 1 Tablet(s) Oral two times a day  tamsulosin 0.4 milliGRAM(s) Oral at bedtime    MEDICATIONS  (PRN):  ALPRAZolam 0.25 milliGRAM(s) Oral daily PRN per pt request  artificial tears (preservative free) Ophthalmic Solution 1 Drop(s) Both EYES every 2 hours PRN Dry Eyes  benzocaine 15 mG/menthol 3.6 mG Lozenge 1 Lozenge Oral every 3 hours PRN Sore Throat  calcium carbonate    500 mG (Tums) Chewable 1 Tablet(s) Chew every 4 hours PRN Indigestion  gabapentin 100 milliGRAM(s) Oral three times a day PRN pain  melatonin 3 milliGRAM(s) Oral at bedtime PRN Insomnia  oxyCODONE    IR 5 milliGRAM(s) Oral every 4 hours PRN Moderate Pain (4 - 6)  oxyCODONE    IR 10 milliGRAM(s) Oral every 4 hours PRN Severe Pain (7 - 10)  senna 2 Tablet(s) Oral at bedtime PRN Constipation

## 2022-10-05 NOTE — DIETITIAN INITIAL EVALUATION ADULT - OTHER CALCULATIONS
ibw 178# +/- 10%  Estimated needs based on +10% of SUSANNE 195#/89kg  Estimated fluid needs deferred to medical team

## 2022-10-05 NOTE — DIETITIAN INITIAL EVALUATION ADULT - PERTINENT LABORATORY DATA
09-30 Na 138 mmol/L Glu 111 mg/dL<H> K+ 4.3 mmol/L Cr 0.84 mg/dL BUN 10 mg/dL Phos n/a      A1C with Estimated Average Glucose Result: 5.8 % (03-18-22 @ 11:28)

## 2022-10-05 NOTE — DIETITIAN INITIAL EVALUATION ADULT - PERTINENT MEDS FT
MEDICATIONS  (STANDING):  acetaminophen     Tablet .. 650 milliGRAM(s) Oral every 6 hours  aMIOdarone    Tablet 200 milliGRAM(s) Oral daily  DULoxetine 60 milliGRAM(s) Oral daily  enoxaparin Injectable 40 milliGRAM(s) SubCutaneous every 24 hours  furosemide    Tablet 20 milliGRAM(s) Oral daily  ibuprofen  Tablet. 400 milliGRAM(s) Oral every 6 hours  influenza  Vaccine (HIGH DOSE) 0.7 milliLiter(s) IntraMuscular once  levothyroxine 25 MICROGram(s) Oral daily  metoprolol succinate ER 50 milliGRAM(s) Oral daily  pancrelipase  (CREON 24,000 Lipase Units) 3 Capsule(s) Oral three times a day with meals  pantoprazole    Tablet 40 milliGRAM(s) Oral before breakfast  QUEtiapine 25 milliGRAM(s) Oral at bedtime  sacubitril 49 mG/valsartan 51 mG 1 Tablet(s) Oral two times a day  tamsulosin 0.4 milliGRAM(s) Oral at bedtime    MEDICATIONS  (PRN):  ALPRAZolam 0.25 milliGRAM(s) Oral daily PRN per pt request  artificial tears (preservative free) Ophthalmic Solution 1 Drop(s) Both EYES every 2 hours PRN Dry Eyes  benzocaine 15 mG/menthol 3.6 mG Lozenge 1 Lozenge Oral every 3 hours PRN Sore Throat  calcium carbonate    500 mG (Tums) Chewable 1 Tablet(s) Chew every 4 hours PRN Indigestion  gabapentin 100 milliGRAM(s) Oral three times a day PRN pain  melatonin 3 milliGRAM(s) Oral at bedtime PRN Insomnia  oxyCODONE    IR 5 milliGRAM(s) Oral every 4 hours PRN Moderate Pain (4 - 6)  oxyCODONE    IR 10 milliGRAM(s) Oral every 4 hours PRN Severe Pain (7 - 10)  senna 2 Tablet(s) Oral at bedtime PRN Constipation

## 2022-10-06 NOTE — PROGRESS NOTE ADULT - SUBJECTIVE AND OBJECTIVE BOX
[facility] Medicine  Pager [phone]    Patient is a 81y old  Male who presents with a chief complaint of Other complications of procedures, not elsewhere classified, subsequent encounter     (05 Oct 2022 15:48)      INTERVAL HPI/OVERNIGHT EVENTS:    MEDICATIONS  (STANDING):  acetaminophen     Tablet .. 650 milliGRAM(s) Oral every 6 hours  aMIOdarone    Tablet 200 milliGRAM(s) Oral daily  DULoxetine 60 milliGRAM(s) Oral daily  enoxaparin Injectable 40 milliGRAM(s) SubCutaneous every 24 hours  furosemide    Tablet 20 milliGRAM(s) Oral daily  ibuprofen  Tablet. 400 milliGRAM(s) Oral every 6 hours  influenza  Vaccine (HIGH DOSE) 0.7 milliLiter(s) IntraMuscular once  levothyroxine 25 MICROGram(s) Oral daily  metoprolol succinate ER 50 milliGRAM(s) Oral daily  pancrelipase  (CREON 24,000 Lipase Units) 3 Capsule(s) Oral three times a day with meals  pantoprazole    Tablet 40 milliGRAM(s) Oral before breakfast  QUEtiapine 25 milliGRAM(s) Oral at bedtime  sacubitril 49 mG/valsartan 51 mG 1 Tablet(s) Oral two times a day  tamsulosin 0.4 milliGRAM(s) Oral at bedtime    MEDICATIONS  (PRN):  ALPRAZolam 0.25 milliGRAM(s) Oral daily PRN per pt request  artificial tears (preservative free) Ophthalmic Solution 1 Drop(s) Both EYES every 2 hours PRN Dry Eyes  benzocaine 15 mG/menthol 3.6 mG Lozenge 1 Lozenge Oral every 3 hours PRN Sore Throat  calcium carbonate    500 mG (Tums) Chewable 1 Tablet(s) Chew every 4 hours PRN Indigestion  gabapentin 100 milliGRAM(s) Oral three times a day PRN pain  melatonin 3 milliGRAM(s) Oral at bedtime PRN Insomnia  oxyCODONE    IR 10 milliGRAM(s) Oral every 4 hours PRN Severe Pain (7 - 10)  oxyCODONE    IR 5 milliGRAM(s) Oral every 4 hours PRN Moderate Pain (4 - 6)  senna 2 Tablet(s) Oral at bedtime PRN Constipation      Allergies    No Known Allergies    Intolerances        REVIEW OF SYSTEMS:  Please see interval HPI:    Vital Signs Last 24 Hrs  T(C): 36.9 (06 Oct 2022 09:49), Max: 36.9 (06 Oct 2022 09:49)  T(F): 98.5 (06 Oct 2022 09:49), Max: 98.5 (06 Oct 2022 09:49)  HR: 71 (06 Oct 2022 09:49) (60 - 81)  BP: 119/57 (06 Oct 2022 09:49) (99/57 - 124/64)  BP(mean): --  RR: 18 (06 Oct 2022 09:49) (17 - 18)  SpO2: 97% (06 Oct 2022 09:49) (97% - 100%)    Parameters below as of 06 Oct 2022 09:49  Patient On (Oxygen Delivery Method): room air      I&O's Detail    05 Oct 2022 07:01  -  06 Oct 2022 07:00  --------------------------------------------------------  IN:  Total IN: 0 mL    OUT:    Intermittent Catheterization - Urethral (mL): 650 mL    VAC (Vacuum Assisted Closure) System (mL): 175 mL    Voided (mL): 1375 mL  Total OUT: 2200 mL    Total NET: -2200 mL      06 Oct 2022 07:01  -  06 Oct 2022 11:29  --------------------------------------------------------  IN:  Total IN: 0 mL    OUT:    Voided (mL): 350 mL  Total OUT: 350 mL    Total NET: -350 mL            PHYSICAL EXAM:  GENERAL:   HEAD:    EYES:   ENMT:   NECK:   NERVOUS SYSTEM:    CHEST/LUNG:   HEART:   ABDOMEN:   EXTREMITIES:    LYMPH:   SKIN:     LABS:            CAPILLARY BLOOD GLUCOSE        BLOOD CULTURE    RADIOLOGY & ADDITIONAL TESTS:    Imaging Personally Reviewed:  [ ] YES     Consultant(s) Notes Reviewed:      Care Discussed with Consultants/Other Providers: louie [facility] Medicine  Pager [phone]    Patient is a 81y old  Male who presents with a chief complaint of Other complications of procedures, not elsewhere classified, subsequent encounter     (05 Oct 2022 15:48)      INTERVAL HPI/OVERNIGHT EVENTS:  Moved to different room d/t COVID+ result. Patient may have had previous positive (if confirmed, then could be potentially be off isolation as per facility protocol). Patient currently denies fever/chills/cough/SOB. Reports has had visitors in the room (said they wore masks). Says has been fully vaccinated.     Only complaint right now is pain at L thigh skin graft site (pushed call bell to ask for pain med). Also would like urinal because feels like has to urinate.      MEDICATIONS  (STANDING):  acetaminophen     Tablet .. 650 milliGRAM(s) Oral every 6 hours  aMIOdarone    Tablet 200 milliGRAM(s) Oral daily  DULoxetine 60 milliGRAM(s) Oral daily  enoxaparin Injectable 40 milliGRAM(s) SubCutaneous every 24 hours  furosemide    Tablet 20 milliGRAM(s) Oral daily  ibuprofen  Tablet. 400 milliGRAM(s) Oral every 6 hours  influenza  Vaccine (HIGH DOSE) 0.7 milliLiter(s) IntraMuscular once  levothyroxine 25 MICROGram(s) Oral daily  metoprolol succinate ER 50 milliGRAM(s) Oral daily  pancrelipase  (CREON 24,000 Lipase Units) 3 Capsule(s) Oral three times a day with meals  pantoprazole    Tablet 40 milliGRAM(s) Oral before breakfast  QUEtiapine 25 milliGRAM(s) Oral at bedtime  sacubitril 49 mG/valsartan 51 mG 1 Tablet(s) Oral two times a day  tamsulosin 0.4 milliGRAM(s) Oral at bedtime    MEDICATIONS  (PRN):  ALPRAZolam 0.25 milliGRAM(s) Oral daily PRN per pt request  artificial tears (preservative free) Ophthalmic Solution 1 Drop(s) Both EYES every 2 hours PRN Dry Eyes  benzocaine 15 mG/menthol 3.6 mG Lozenge 1 Lozenge Oral every 3 hours PRN Sore Throat  calcium carbonate    500 mG (Tums) Chewable 1 Tablet(s) Chew every 4 hours PRN Indigestion  gabapentin 100 milliGRAM(s) Oral three times a day PRN pain  melatonin 3 milliGRAM(s) Oral at bedtime PRN Insomnia  oxyCODONE    IR 10 milliGRAM(s) Oral every 4 hours PRN Severe Pain (7 - 10)  oxyCODONE    IR 5 milliGRAM(s) Oral every 4 hours PRN Moderate Pain (4 - 6)  senna 2 Tablet(s) Oral at bedtime PRN Constipation    Allergies  No Known Allergies    Intolerances    REVIEW OF SYSTEMS:  Please see interval HPI:    Vital Signs Last 24 Hrs  T(C): 36.9 (06 Oct 2022 09:49), Max: 36.9 (06 Oct 2022 09:49)  T(F): 98.5 (06 Oct 2022 09:49), Max: 98.5 (06 Oct 2022 09:49)  HR: 71 (06 Oct 2022 09:49) (60 - 81)  BP: 119/57 (06 Oct 2022 09:49) (99/57 - 124/64)  RR: 18 (06 Oct 2022 09:49) (17 - 18)  SpO2: 97% (06 Oct 2022 09:49) (97% - 100%)    Parameters below as of 06 Oct 2022 09:49  Patient On (Oxygen Delivery Method): room air      I&O's Detail    05 Oct 2022 07:01  -  06 Oct 2022 07:00  --------------------------------------------------------  IN:  Total IN: 0 mL    OUT:    Intermittent Catheterization - Urethral (mL): 650 mL    VAC (Vacuum Assisted Closure) System (mL): 175 mL    Voided (mL): 1375 mL  Total OUT: 2200 mL    Total NET: -2200 mL      06 Oct 2022 07:01  -  06 Oct 2022 11:29  --------------------------------------------------------  IN:  Total IN: 0 mL    OUT:    Voided (mL): 350 mL  Total OUT: 350 mL    Total NET: -350 mL      PHYSICAL EXAM:  GENERAL: NAD, lying in bed, little uncomfortable appearing  HEAD:  NC/AT  EYES: EOMI, clear sclera/conjunctiva  ENMT: MMM, hearing intact to voice  NECK: supple, no JVD  NERVOUS SYSTEM:  moving all extremities, SILT grossly, non-focal  CHEST/LUNG: CTAB, no wheeze appreciated, speaking in full sentences, no respiratory distress on RA  HEART: S1S2 RRR  ABDOMEN: soft, non-tender +wound vac in place  EXTREMITIES:  no c/c/e  SKIN: + L thigh skin graft site (no dressing at this time)      LABS:  COVID-19 PCR: Detected: 10/5      CAPILLARY BLOOD GLUCOSE    BLOOD CULTURE    RADIOLOGY & ADDITIONAL TESTS:    Imaging Personally Reviewed:  [ ] YES     Consultant(s) Notes Reviewed:  PRS, otpt records via [facility], had [facility] urgent care visit on 9/17    Care Discussed with Consultants/Other Providers:

## 2022-10-06 NOTE — PROGRESS NOTE ADULT - ASSESSMENT
82 yo M w/ HFrEF (EF 30-35%), VT, s/p ACID, AS s/p TAVR, CAD s/p stents, GERD, hypothyroid, anxiety, pancreatic ca s/p whipple 10/26/21, chemo and xrt with non-healing abdominal wound treated w/ HBOT and skin graft 7/8/22, admitted for second skin graft, s/p procedure 9/29 and wound vac placement.     # COVID+ result (10/5)  - patient reports he is vaccinated, is not hypoxic, is otherwise asymptomatic, no intervention required  - [facility] HIE reviewed, patient had a [facility] urgent care visit 9/17 where he was tested for COVID, though result not seen in HIE   - chart note reviewed, PRS reports [facility] thus patient had already completed quarantine and no longer needs isolation as per facility protocols    # open wound of abdominal wall, s/p STSG to abdomen, wound vac placement on 9/29  - reports pain medications helping to manage abdominal and skin graft pain  - has abdominal wound vac in place  - c/w pain management, wound care and post-op management as per PRS/Surgery  - bowel regimen to prevent opioid induced constipation  - would be judicious re: use of NSAIDS in elderly patients, on ppi for GERD    # HFrEF (EF 30-35%), VT, s/p AICD, AS, s/p TAVR, CAD s/p stents  - denies chest pain, clinically euvolemic on exam, respiratory status stable on RA  - c/w amiodarone for hx VT  - c/w lasix, toprol, entresto for chronic HFrEF  - consider periodic bloodwork to monitor renal function/lytes as is on diuretic  - continue hold parameters on medications, monitor BP, currently in acceptable range    # Pancreatic cancer  - c/w creon, otpt f/u    # Hypothyroid  - c/w synthroid    # Anxiety  - on seroquel, cymbalta, with xanax prn,     # BPH  - c/w flomax  -s/p TOV, monitor UOP, patient reports he is voiding    Need for prophylactic measure  VTE ppx: lovenox as per PRS/Sx    Dispo: anticipate [facility]

## 2022-10-06 NOTE — PROGRESS NOTE ADULT - SUBJECTIVE AND OBJECTIVE BOX
Plastic Surgery Progress Note (pg [facility]: [id number], [facility]: [phone])    SUBJECTIVE  The patient was seen and examined. No acute events overnight.    OBJECTIVE  ___________________________________________________  VITAL SIGNS / I&O's   Vital Signs Last 24 Hrs  T(C): 36.7 (06 Oct 2022 02:23), Max: 36.7 (05 Oct 2022 21:05)  T(F): 98 (06 Oct 2022 02:23), Max: 98.1 (05 Oct 2022 21:05)  HR: 81 (06 Oct 2022 02:23) (60 - 81)  BP: 110/54 (06 Oct 2022 02:23) (93/46 - 124/64)  BP(mean): --  RR: 18 (06 Oct 2022 02:23) (17 - 18)  SpO2: 99% (06 Oct 2022 02:23) (95% - 100%)    Parameters below as of 06 Oct 2022 02:23  Patient On (Oxygen Delivery Method): room air          04 Oct 2022 07:01  -  05 Oct 2022 07:00  --------------------------------------------------------  IN:  Total IN: 0 mL    OUT:    Indwelling Catheter - Urethral (mL): 300 mL    VAC (Vacuum Assisted Closure) System (mL): 360 mL    Voided (mL): 200 mL  Total OUT: 860 mL    Total NET: -860 mL      05 Oct 2022 07:01  -  06 Oct 2022 05:47  --------------------------------------------------------  IN:  Total IN: 0 mL    OUT:    Intermittent Catheterization - Urethral (mL): 650 mL    VAC (Vacuum Assisted Closure) System (mL): 175 mL    Voided (mL): 1225 mL  Total OUT: 2050 mL    Total NET: -2050 mL        ___________________________________________________  PHYSICAL EXAM  -- CONSTITUTIONAL: NAD, lying in bed  -- NEURO: Awake, alert  -- PULM: Non-labored respirations  -- ABDOMEN: vac removed, site covered with xeroform, telfa and gauze; abdominal binder.   abdomen soft  -- LLE: skin graft dressing in place, with appropriate ooze.      ___________________________________________________  LABS            CAPILLARY BLOOD GLUCOSE              ___________________________________________________  MICRO  Recent Cultures:    ___________________________________________________  MEDICATIONS  (STANDING):  acetaminophen     Tablet .. 650 milliGRAM(s) Oral every 6 hours  aMIOdarone    Tablet 200 milliGRAM(s) Oral daily  DULoxetine 60 milliGRAM(s) Oral daily  enoxaparin Injectable 40 milliGRAM(s) SubCutaneous every 24 hours  furosemide    Tablet 20 milliGRAM(s) Oral daily  ibuprofen  Tablet. 400 milliGRAM(s) Oral every 6 hours  influenza  Vaccine (HIGH DOSE) 0.7 milliLiter(s) IntraMuscular once  levothyroxine 25 MICROGram(s) Oral daily  metoprolol succinate ER 50 milliGRAM(s) Oral daily  pancrelipase  (CREON 24,000 Lipase Units) 3 Capsule(s) Oral three times a day with meals  pantoprazole    Tablet 40 milliGRAM(s) Oral before breakfast  QUEtiapine 25 milliGRAM(s) Oral at bedtime  sacubitril 49 mG/valsartan 51 mG 1 Tablet(s) Oral two times a day  tamsulosin 0.4 milliGRAM(s) Oral at bedtime    MEDICATIONS  (PRN):  ALPRAZolam 0.25 milliGRAM(s) Oral daily PRN per pt request  artificial tears (preservative free) Ophthalmic Solution 1 Drop(s) Both EYES every 2 hours PRN Dry Eyes  benzocaine 15 mG/menthol 3.6 mG Lozenge 1 Lozenge Oral every 3 hours PRN Sore Throat  calcium carbonate    500 mG (Tums) Chewable 1 Tablet(s) Chew every 4 hours PRN Indigestion  gabapentin 100 milliGRAM(s) Oral three times a day PRN pain  melatonin 3 milliGRAM(s) Oral at bedtime PRN Insomnia  oxyCODONE    IR 5 milliGRAM(s) Oral every 4 hours PRN Moderate Pain (4 - 6)  oxyCODONE    IR 10 milliGRAM(s) Oral every 4 hours PRN Severe Pain (7 - 10)  senna 2 Tablet(s) Oral at bedtime PRN Constipation   Plastic Surgery Progress Note (pg [facility]: [id number], [facility]: [phone])    SUBJECTIVE  The patient was seen and examined. No acute events overnight.    OBJECTIVE  ___________________________________________________  VITAL SIGNS / I&O's   Vital Signs Last 24 Hrs  T(C): 36.7 (06 Oct 2022 02:23), Max: 36.7 (05 Oct 2022 21:05)  T(F): 98 (06 Oct 2022 02:23), Max: 98.1 (05 Oct 2022 21:05)  HR: 81 (06 Oct 2022 02:23) (60 - 81)  BP: 110/54 (06 Oct 2022 02:23) (93/46 - 124/64)  BP(mean): --  RR: 18 (06 Oct 2022 02:23) (17 - 18)  SpO2: 99% (06 Oct 2022 02:23) (95% - 100%)    Parameters below as of 06 Oct 2022 02:23  Patient On (Oxygen Delivery Method): room air          04 Oct 2022 07:01  -  05 Oct 2022 07:00  --------------------------------------------------------  IN:  Total IN: 0 mL    OUT:    Indwelling Catheter - Urethral (mL): 300 mL    VAC (Vacuum Assisted Closure) System (mL): 360 mL    Voided (mL): 200 mL  Total OUT: 860 mL    Total NET: -860 mL      05 Oct 2022 07:01  -  06 Oct 2022 05:47  --------------------------------------------------------  IN:  Total IN: 0 mL    OUT:    Intermittent Catheterization - Urethral (mL): 650 mL    VAC (Vacuum Assisted Closure) System (mL): 175 mL    Voided (mL): 1225 mL  Total OUT: 2050 mL    Total NET: -2050 mL        ___________________________________________________  PHYSICAL EXAM  -- CONSTITUTIONAL: NAD, lying in bed  -- NEURO: Awake, alert  -- PULM: Non-labored respirations  -- ABDOMEN: vac removed, skin graft with minimal take. site covered with xeroform, telfa and gauze; abdominal binder.   abdomen soft  -- LLE: skin graft dressing in place, with appropriate ooze.      ___________________________________________________  LABS            CAPILLARY BLOOD GLUCOSE              ___________________________________________________  MICRO  Recent Cultures:    ___________________________________________________  MEDICATIONS  (STANDING):  acetaminophen     Tablet .. 650 milliGRAM(s) Oral every 6 hours  aMIOdarone    Tablet 200 milliGRAM(s) Oral daily  DULoxetine 60 milliGRAM(s) Oral daily  enoxaparin Injectable 40 milliGRAM(s) SubCutaneous every 24 hours  furosemide    Tablet 20 milliGRAM(s) Oral daily  ibuprofen  Tablet. 400 milliGRAM(s) Oral every 6 hours  influenza  Vaccine (HIGH DOSE) 0.7 milliLiter(s) IntraMuscular once  levothyroxine 25 MICROGram(s) Oral daily  metoprolol succinate ER 50 milliGRAM(s) Oral daily  pancrelipase  (CREON 24,000 Lipase Units) 3 Capsule(s) Oral three times a day with meals  pantoprazole    Tablet 40 milliGRAM(s) Oral before breakfast  QUEtiapine 25 milliGRAM(s) Oral at bedtime  sacubitril 49 mG/valsartan 51 mG 1 Tablet(s) Oral two times a day  tamsulosin 0.4 milliGRAM(s) Oral at bedtime    MEDICATIONS  (PRN):  ALPRAZolam 0.25 milliGRAM(s) Oral daily PRN per pt request  artificial tears (preservative free) Ophthalmic Solution 1 Drop(s) Both EYES every 2 hours PRN Dry Eyes  benzocaine 15 mG/menthol 3.6 mG Lozenge 1 Lozenge Oral every 3 hours PRN Sore Throat  calcium carbonate    500 mG (Tums) Chewable 1 Tablet(s) Chew every 4 hours PRN Indigestion  gabapentin 100 milliGRAM(s) Oral three times a day PRN pain  melatonin 3 milliGRAM(s) Oral at bedtime PRN Insomnia  oxyCODONE    IR 5 milliGRAM(s) Oral every 4 hours PRN Moderate Pain (4 - 6)  oxyCODONE    IR 10 milliGRAM(s) Oral every 4 hours PRN Severe Pain (7 - 10)  senna 2 Tablet(s) Oral at bedtime PRN Constipation

## 2022-10-06 NOTE — PROGRESS NOTE ADULT - ASSESSMENT
ASSESSMENT/PLAN:   89M PMH HTN, cardiomyopathy, GERD, TAVR, L KARIE, whipple s/p STSG to abdomen    Plan  - Covid+  - C/w vac  - C/w home meds  - Daily dressing changes to abdominal wound: xeroform, telfa, gauze; abdominal binder. Aquaphor daily to skin graft donor site.   - F/u CM today  - DVT ppx  - Medicine recs  - PT recs: [facility] Philip MD PGY-1  Plastic Surgery   [facility]: [id number]  [facility]: [phone] ASSESSMENT/PLAN:   89M PMH HTN, cardiomyopathy, GERD, TAVR, L KARIE, whipple s/p STSG to abdomen    Plan  - Covid+, follow up previous positive test  - C/w vac  - C/w home meds  - Daily dressing changes to abdominal wound: xeroform, telfa, gauze; abdominal binder. Aquaphor daily to skin graft donor site.   - F/u CM today  - DVT ppx  - Medicine recs  - PT recs: [facility] Philip MD PGY-1  Plastic Surgery   [facility]: [id number]  [facility]: [phone]

## 2022-10-06 NOTE — CHART NOTE - NSCHARTNOTEFT_GEN_A_CORE
Patient had previous positive Covid test on 9/17/22 performed at [facility].   The patient has completed his quarantine and no longer needs isolation precautions.  The patient is cleared to be discharged to rehab.     Gilbert Philip  Plastic Surgery Resident PGY-1  [facility]: [phone]  [facility]: [id number]  Available on [employer] Teams

## 2022-10-07 NOTE — PROGRESS NOTE ADULT - SUBJECTIVE AND OBJECTIVE BOX
[facility] Medicine  Pager [phone]    Patient is a 81y old  Male who presents with a chief complaint of Other complications of procedures, not elsewhere classified, subsequent encounter     (05 Oct 2022 15:48)      INTERVAL HPI/OVERNIGHT EVENTS:    MEDICATIONS  (STANDING):  acetaminophen     Tablet .. 650 milliGRAM(s) Oral every 6 hours  aMIOdarone    Tablet 200 milliGRAM(s) Oral daily  AQUAPHOR (petrolatum Ointment) 1 Application(s) Topical daily  DULoxetine 60 milliGRAM(s) Oral daily  enoxaparin Injectable 40 milliGRAM(s) SubCutaneous every 24 hours  furosemide    Tablet 20 milliGRAM(s) Oral daily  ibuprofen  Tablet. 400 milliGRAM(s) Oral every 6 hours  influenza  Vaccine (HIGH DOSE) 0.7 milliLiter(s) IntraMuscular once  levothyroxine 25 MICROGram(s) Oral daily  metoprolol succinate ER 50 milliGRAM(s) Oral daily  pancrelipase  (CREON 24,000 Lipase Units) 3 Capsule(s) Oral three times a day with meals  pantoprazole    Tablet 40 milliGRAM(s) Oral before breakfast  QUEtiapine 25 milliGRAM(s) Oral at bedtime  sacubitril 49 mG/valsartan 51 mG 1 Tablet(s) Oral two times a day  tamsulosin 0.4 milliGRAM(s) Oral at bedtime    MEDICATIONS  (PRN):  artificial tears (preservative free) Ophthalmic Solution 1 Drop(s) Both EYES every 2 hours PRN Dry Eyes  benzocaine 15 mG/menthol 3.6 mG Lozenge 1 Lozenge Oral every 3 hours PRN Sore Throat  calcium carbonate    500 mG (Tums) Chewable 1 Tablet(s) Chew every 4 hours PRN Indigestion  gabapentin 100 milliGRAM(s) Oral three times a day PRN pain  melatonin 3 milliGRAM(s) Oral at bedtime PRN Insomnia  oxyCODONE    IR 5 milliGRAM(s) Oral every 4 hours PRN Moderate Pain (4 - 6)  senna 2 Tablet(s) Oral at bedtime PRN Constipation      Allergies    No Known Allergies    Intolerances        REVIEW OF SYSTEMS:  Please see interval HPI:    Vital Signs Last 24 Hrs  T(C): 36.2 (07 Oct 2022 09:08), Max: 36.7 (06 Oct 2022 17:23)  T(F): 97.1 (07 Oct 2022 09:08), Max: 98.1 (06 Oct 2022 17:23)  HR: 81 (07 Oct 2022 09:08) (74 - 101)  BP: 123/57 (07 Oct 2022 09:08) (99/53 - 139/65)  BP(mean): --  RR: 17 (07 Oct 2022 09:08) (17 - 18)  SpO2: 99% (07 Oct 2022 09:08) (96% - 99%)    Parameters below as of 07 Oct 2022 09:08  Patient On (Oxygen Delivery Method): room air      I&O's Detail    06 Oct 2022 07:01  -  07 Oct 2022 07:00  --------------------------------------------------------  IN:  Total IN: 0 mL    OUT:    Voided (mL): 1150 mL  Total OUT: 1150 mL    Total NET: -1150 mL            PHYSICAL EXAM:  GENERAL:   HEAD:    EYES:   ENMT:   NECK:   NERVOUS SYSTEM:    CHEST/LUNG:   HEART:   ABDOMEN:   EXTREMITIES:    LYMPH:   SKIN:     LABS:            CAPILLARY BLOOD GLUCOSE      POCT Blood Glucose.: 121 mg/dL (06 Oct 2022 21:22)  POCT Blood Glucose.: 126 mg/dL (06 Oct 2022 16:28)    BLOOD CULTURE    RADIOLOGY & ADDITIONAL TESTS:    Imaging Personally Reviewed:  [ ] YES     Consultant(s) Notes Reviewed:      Care Discussed with Consultants/Other Providers: [facility] Medicine  Pager [phone]    Patient is a 81y old  Male who presents with a chief complaint of Other complications of procedures, not elsewhere classified, subsequent encounter     (05 Oct 2022 15:48)      INTERVAL HPI/OVERNIGHT EVENTS:  No acute complaints, pain meds helping to control pain, denies CP/SOB. Reports wound vac removed, has abdominal binder in place. Plan is for [facility].       MEDICATIONS  (STANDING):  acetaminophen     Tablet .. 650 milliGRAM(s) Oral every 6 hours  aMIOdarone    Tablet 200 milliGRAM(s) Oral daily  AQUAPHOR (petrolatum Ointment) 1 Application(s) Topical daily  DULoxetine 60 milliGRAM(s) Oral daily  enoxaparin Injectable 40 milliGRAM(s) SubCutaneous every 24 hours  furosemide    Tablet 20 milliGRAM(s) Oral daily  ibuprofen  Tablet. 400 milliGRAM(s) Oral every 6 hours  influenza  Vaccine (HIGH DOSE) 0.7 milliLiter(s) IntraMuscular once  levothyroxine 25 MICROGram(s) Oral daily  metoprolol succinate ER 50 milliGRAM(s) Oral daily  pancrelipase  (CREON 24,000 Lipase Units) 3 Capsule(s) Oral three times a day with meals  pantoprazole    Tablet 40 milliGRAM(s) Oral before breakfast  QUEtiapine 25 milliGRAM(s) Oral at bedtime  sacubitril 49 mG/valsartan 51 mG 1 Tablet(s) Oral two times a day  tamsulosin 0.4 milliGRAM(s) Oral at bedtime    MEDICATIONS  (PRN):  artificial tears (preservative free) Ophthalmic Solution 1 Drop(s) Both EYES every 2 hours PRN Dry Eyes  benzocaine 15 mG/menthol 3.6 mG Lozenge 1 Lozenge Oral every 3 hours PRN Sore Throat  calcium carbonate    500 mG (Tums) Chewable 1 Tablet(s) Chew every 4 hours PRN Indigestion  gabapentin 100 milliGRAM(s) Oral three times a day PRN pain  melatonin 3 milliGRAM(s) Oral at bedtime PRN Insomnia  oxyCODONE    IR 5 milliGRAM(s) Oral every 4 hours PRN Moderate Pain (4 - 6)  senna 2 Tablet(s) Oral at bedtime PRN Constipation      Allergies  No Known Allergies    Intolerances    REVIEW OF SYSTEMS:  Please see interval HPI:    Vital Signs Last 24 Hrs  T(C): 36.2 (07 Oct 2022 09:08), Max: 36.7 (06 Oct 2022 17:23)  T(F): 97.1 (07 Oct 2022 09:08), Max: 98.1 (06 Oct 2022 17:23)  HR: 81 (07 Oct 2022 09:08) (74 - 101)  BP: 123/57 (07 Oct 2022 09:08) (99/53 - 139/65)  RR: 17 (07 Oct 2022 09:08) (17 - 18)  SpO2: 99% (07 Oct 2022 09:08) (96% - 99%)    Parameters below as of 07 Oct 2022 09:08  Patient On (Oxygen Delivery Method): room air      I&O's Detail    06 Oct 2022 07:01  -  07 Oct 2022 07:00  --------------------------------------------------------  IN:  Total IN: 0 mL    OUT:    Voided (mL): 1150 mL  Total OUT: 1150 mL    Total NET: -1150 mL      PHYSICAL EXAM:  GENERAL: NAD, sitting in chair  HEAD:  NC/AT  EYES: EOMI, clear sclera/conjunctiva  ENMT: MMM, hearing intact to voice  NECK: supple, no JVD  NERVOUS SYSTEM:  moving all extremities, SILT grossly, non-focal  CHEST/LUNG: CTAB, no wheeze appreciated, speaking in full sentences, no respiratory distress on RA  HEART: S1S2 RRR  ABDOMEN: soft, non-tender, abdominal binder in place, dressing c/d/i  EXTREMITIES:  no c/c/e  SKIN: + L thigh skin graft site (no dressing at this time)      LABS:            CAPILLARY BLOOD GLUCOSE      POCT Blood Glucose.: 121 mg/dL (06 Oct 2022 21:22)  POCT Blood Glucose.: 126 mg/dL (06 Oct 2022 16:28)    BLOOD CULTURE    RADIOLOGY & ADDITIONAL TESTS:    Imaging Personally Reviewed:  [ ] YES     Consultant(s) Notes Reviewed:  [facility]    Care Discussed with Consultants/Other Providers:

## 2022-10-07 NOTE — PROGRESS NOTE ADULT - SUBJECTIVE AND OBJECTIVE BOX
Plastic Surgery Progress Note (pg [facility]: [id number], [facility]: [phone])    SUBJECTIVE  The patient was seen and examined. No acute events overnight.    OBJECTIVE  ___________________________________________________  VITAL SIGNS / I&O's   Vital Signs Last 24 Hrs  T(C): 36.3 (07 Oct 2022 01:30), Max: 36.9 (06 Oct 2022 09:49)  T(F): 97.3 (07 Oct 2022 01:30), Max: 98.5 (06 Oct 2022 09:49)  HR: 77 (07 Oct 2022 01:30) (71 - 101)  BP: 110/57 (07 Oct 2022 01:30) (99/53 - 139/65)  BP(mean): --  RR: 17 (07 Oct 2022 01:30) (17 - 18)  SpO2: 97% (07 Oct 2022 01:30) (96% - 99%)    Parameters below as of 07 Oct 2022 01:30  Patient On (Oxygen Delivery Method): room air          05 Oct 2022 07:01  -  06 Oct 2022 07:00  --------------------------------------------------------  IN:  Total IN: 0 mL    OUT:    Intermittent Catheterization - Urethral (mL): 650 mL    VAC (Vacuum Assisted Closure) System (mL): 175 mL    Voided (mL): 1375 mL  Total OUT: 2200 mL    Total NET: -2200 mL      06 Oct 2022 07:01  -  07 Oct 2022 06:09  --------------------------------------------------------  IN:  Total IN: 0 mL    OUT:    Voided (mL): 950 mL  Total OUT: 950 mL    Total NET: -950 mL        ___________________________________________________  PHYSICAL EXAM  -- CONSTITUTIONAL: NAD, lying in bed  -- NEURO: Awake, alert  -- PULM: Non-labored respirations  -- ABDOMEN: vac removed, skin graft with minimal take. site covered with xeroform, telfa and gauze; abdominal binder.   abdomen soft  -- LLE: open to air; healing appropriately          ___________________________________________________  LABS            CAPILLARY BLOOD GLUCOSE      POCT Blood Glucose.: 121 mg/dL (06 Oct 2022 21:22)  POCT Blood Glucose.: 126 mg/dL (06 Oct 2022 16:28)          ___________________________________________________  MICRO  Recent Cultures:    ___________________________________________________  MEDICATIONS  (STANDING):  acetaminophen     Tablet .. 650 milliGRAM(s) Oral every 6 hours  aMIOdarone    Tablet 200 milliGRAM(s) Oral daily  DULoxetine 60 milliGRAM(s) Oral daily  enoxaparin Injectable 40 milliGRAM(s) SubCutaneous every 24 hours  furosemide    Tablet 20 milliGRAM(s) Oral daily  ibuprofen  Tablet. 400 milliGRAM(s) Oral every 6 hours  influenza  Vaccine (HIGH DOSE) 0.7 milliLiter(s) IntraMuscular once  levothyroxine 25 MICROGram(s) Oral daily  metoprolol succinate ER 50 milliGRAM(s) Oral daily  pancrelipase  (CREON 24,000 Lipase Units) 3 Capsule(s) Oral three times a day with meals  pantoprazole    Tablet 40 milliGRAM(s) Oral before breakfast  QUEtiapine 25 milliGRAM(s) Oral at bedtime  sacubitril 49 mG/valsartan 51 mG 1 Tablet(s) Oral two times a day  tamsulosin 0.4 milliGRAM(s) Oral at bedtime    MEDICATIONS  (PRN):  artificial tears (preservative free) Ophthalmic Solution 1 Drop(s) Both EYES every 2 hours PRN Dry Eyes  benzocaine 15 mG/menthol 3.6 mG Lozenge 1 Lozenge Oral every 3 hours PRN Sore Throat  calcium carbonate    500 mG (Tums) Chewable 1 Tablet(s) Chew every 4 hours PRN Indigestion  gabapentin 100 milliGRAM(s) Oral three times a day PRN pain  melatonin 3 milliGRAM(s) Oral at bedtime PRN Insomnia  senna 2 Tablet(s) Oral at bedtime PRN Constipation

## 2022-10-07 NOTE — PROGRESS NOTE ADULT - ASSESSMENT
ASSESSMENT/PLAN:   89M PMH HTN, cardiomyopathy, GERD, TAVR, L KARIE, whipple s/p STSG to abdomen    Plan  - Covid+ with previous documented positive test on 9/17.   - Cleared for rehab  - C/w home meds  - Daily dressing changes to abdominal wound: xeroform, telfa, gauze; abdominal binder. Aquaphor daily to skin graft donor site.   - F/u CM today  - DVT ppx  - Medicine recs  - PT recs: [facility] Philip MD PGY-1  Plastic Surgery   [facility]: [id number]  [facility]: [phone]

## 2022-10-07 NOTE — DISCHARGE NOTE NURSING/CASE MANAGEMENT/SOCIAL WORK - NSPROEXTENSIONSOFSELF_GEN_A_NUR
Subjective:  Raquel Hernandez is a 68 y.o. female who presents to the office today for routine DM foot care. .  Currently denies F/C/N/V. Allergies   Allergen Reactions    Latex Rash    Aleve [Naproxen]      Stomach problems    Amoxicillin      Other reaction(s): mucositis    Aspirin Other (See Comments)     Dizziness and tears her stomach up    Dye [Iodides]      IV DYE    Influenza Vaccines     Reglan [Metoclopramide] Other (See Comments)     Dystonic reaction, involuntary movements       Past Medical History:   Diagnosis Date    Allergic conjunctivitis     (stable)    Chronic sinusitis     Constipation     Dizzy spells     Dry eye syndrome     Edentulous     (does not wear dentures)    Essential hypertension 7/6/2016    Fracture of lateral malleolus     (avulsion fracture at the tip of the lateral malleolus - right ankle).  Hearing loss     Hypothyroidism     Interstitial cystitis     Irritable bowel syndrome     Keratitis     (secondary to dry eye syndrome)    Parotid gland enlargement     stasis, consider sjorgens    Pseudophakos     (bilateral)    Seasonal rhinitis     Stress incontinence     Type II or unspecified type diabetes mellitus without mention of complication, not stated as uncontrolled     Urethral stenosis     Xerostomia        Prior to Admission medications    Medication Sig Start Date End Date Taking? Authorizing Provider   levothyroxine (SYNTHROID) 100 MCG tablet 1 po daily 10/6/21   Katie Mathew MD   omeprazole (PRILOSEC) 20 MG delayed release capsule take 1 capsule by mouth twice a day 9/30/21   Katie Mathew MD   blood glucose monitor strips Test one  time a day & as needed for symptoms of irregular blood glucose. Dispense sufficient amount for indicated testing frequency plus additional to accommodate PRN testing needs.  8/24/21   Katie Mathew MD   Lancets MISC 1 each by Does not apply route daily 8/24/21   Katie Mathew MD   fluticasone (FLONASE) 50 MCG/ACT nasal spray instill 2 sprays into each nostril once daily 21   Ari Romero MD   gabapentin (NEURONTIN) 300 MG capsule take 1 capsule by mouth three times a day 7/14/21 10/12/21  Ari Romero MD   polyethyl glycol-propyl glycol 0.4-0.3 % (SYSTANE) 0.4-0.3 % ophthalmic solution Place 1 drop into both eyes as needed for Dry Eyes 21   Apolonia Mac Myah, OD   diclofenac sodium (VOLTAREN) 1 % GEL Apply 2 g topically 4 times daily 10/30/20   Dossie Bolus, APRN - CNP   blood glucose test strips (ONE TOUCH ULTRA TEST) strip use 1 TEST STRIP to TEST BLOOD SUGAR twice a day 3/18/20   Ari Romero MD   loratadine (CLARITIN) 10 MG tablet take 1 tablet by mouth once daily 10/1/18   Ari Romero MD   SOFT TOUCH LANCETS MISC 1 Device by Does not apply route 2 times daily 16   Ari Romero MD       Past Surgical History:   Procedure Laterality Date    APPENDECTOMY  1986    CATARACT REMOVAL WITH IMPLANT Right 2003    (Dr. Deisy Giron)    CATARACT REMOVAL WITH IMPLANT Left 2003    (Dr. Deisy Giron)    CHOLECYSTECTOMY  1986    COLONOSCOPY  2014    normal    CYSTOURETHROSCOPY/URETHRAL DILATION  2012    multiple    EYE SURGERY Bilateral 2018    Bilateral Transscleral cyclophotocoagulation G6 laser (micropulse) performed by Edwar Mason MD at [address]  1977    (Dr. More Harvey)    UPPER GASTROINTESTINAL ENDOSCOPY  2015    gastritis, biopsy x 2    UPPER GASTROINTESTINAL ENDOSCOPY  2018    Dr. Anisha Mao   antral gastric ulcer. esophagus re-dilated at the end of procedure.       UPPER GASTROINTESTINAL ENDOSCOPY  2021    [facility] EGD Dr. Gloria Malagon History   Problem Relation Age of Onset    Heart Disease Mother     Heart Attack Mother     Arthritis Mother     Other Maternal Grandmother         (gout)    Allergies Daughter     Allergies Daughter     Other Brother         ( at age 21 secondary to auto accident).  Cataracts Neg Hx     Diabetes Neg Hx     Glaucoma Neg Hx        Social History     Tobacco Use    Smoking status: Never Smoker    Smokeless tobacco: Never Used    Tobacco comment: Never smoked. Kennedy Julien Rcp, 7/14/2016. Substance Use Topics    Alcohol use: No     Alcohol/week: 0.0 standard drinks       Review of Systems: All 12 systems reviewed and pertinent positives noted above. Lower Extremity Physical Examination:     Vitals: There were no vitals filed for this visit. General: AAO x 3 in NAD. Vascular: DP and PT pulses palpable 2/4, bilateral.  CFT <3 seconds, bilateral.  Hair growth present to the level of the digits, bilateral.  Edema present, bilateral.  Varicosities present, bilateral. Erythema absent, bilateral. Distal Rubor absent bilateral.  Temperature within normal limits bilateral. Hyperpigmentation present bilateral. Atrophic skin yes. Neurological: Sensation Impaired to light touch to level of digits, bilateral.  Protective sensation intact  10/10 sites via 5.07/10g [place]Christi Monofilament, bilateral.  negative Tinel's, bilateral.  negative Valleix sign, bilateral.  Vibratory abnormal  bilateral.  Reflexes Decreased bilateral.  Paresthesias positive. Dysthesias positive. Sharp/dull abnormal  bilateral.  Musculoskeletal: Muscle strength 5/5, bilateral.  Pain absent upon palpation bilateral. Normal medial longitudinal arch, bilateral.  Ankle ROM decresed,bilateral.  1st MPJ ROM within normal limits, bilateral.  Dorsally contracted digits absent. No other foot deformities. Integument: Warm, dry, supple, bilateral.  Open lesion absent, bilateral.  Interdigital maceration absent to web spaces bilateral.   Nails left 5 and right 1 thickened, dystrophic and crumbly, discolored with subungual debris. .  Fissures absent, bilateral. Hyperkeratotic tissue is absent. Asessment: Patient is a 68 y.o. female with:    Diagnosis Orders   1.  DM (diabetes mellitus), type 2 with neurological complications ([facility] [address]     2. Dermatophytosis of nail         Plan: Patient examined and evaluated. Current condition and treatment options discussed in detail. DM foot ed and exam  All nails as mentioned above debrided in length and thickness. Patient advised OTC methods for treatment of the mycotic nails. Patient will follow up in 10 weeks. Contact office with any questions/problems/concerns. eyeglasses/hearing aid

## 2022-10-07 NOTE — DISCHARGE NOTE NURSING/CASE MANAGEMENT/SOCIAL WORK - PATIENT PORTAL LINK FT
You can access the FollowMyHealth Patient Portal offered by [facility] by registering at the following website: [email or web address]. By joining [facility] FollowMyHealth portal, you will also be able to view your health information using other applications (apps) compatible with our system.

## 2022-10-07 NOTE — DISCHARGE NOTE NURSING/CASE MANAGEMENT/SOCIAL WORK - NSDPDISTO_GEN_ALL_CORE
Sub-Acute rehab Pt. is afebrile and offers no complaints. In no acute distress. Midline abdominal incision with dressing and binder: clean, dry and intact. Left thigh graft site open to air: clean, dry and intact. Pt is ambulating with a walker, tolerating diet well, and voiding in adequate amounts./Sub-Acute rehab

## 2022-10-07 NOTE — DISCHARGE NOTE NURSING/CASE MANAGEMENT/SOCIAL WORK - NSDPFAC_GEN_ALL_CORE
[facility] Rehab - [address] [facility] [id number]

## 2022-10-07 NOTE — DISCHARGE NOTE NURSING/CASE MANAGEMENT/SOCIAL WORK - NSDCPECAREGIVERED_GEN_ALL_CORE
Medline and carenotes for surgical procedure Skin Grafting, Oxycodone IR as well as DC Medications and side effects literature for patient reference.

## 2022-10-07 NOTE — PROGRESS NOTE ADULT - PROVIDER SPECIALTY LIST ADULT
Hospitalist
Plastic Surgery
Plastic Surgery
Hospitalist
Hospitalist
Plastic Surgery
Surgery
Hospitalist
Hospitalist

## 2022-10-07 NOTE — DISCHARGE NOTE NURSING/CASE MANAGEMENT/SOCIAL WORK - NSDCPNINST_GEN_ALL_CORE
Make a follow up appointment with Dr. Gallegos. Call MD if you develop a fever, or if there is redness, swelling, drainage or pain not relieved by pain medication. No heavy lifting, bending, or straining to move your bowels. Take over the counter stool softeners as needed to prevent constipation which may be caused by pain medication.

## 2022-10-07 NOTE — PROGRESS NOTE ADULT - ASSESSMENT
80 yo M w/ HFrEF (EF 30-35%), VT, s/p ACID, AS s/p TAVR, CAD s/p stents, GERD, hypothyroid, anxiety, pancreatic ca s/p whipple 10/26/21, chemo and xrt with non-healing abdominal wound treated w/ HBOT and skin graft 7/8/22, admitted for second skin graft, s/p procedure 9/29 and wound vac placement.     # COVID+ result (10/5)  - patient reports he is vaccinated, is not hypoxic, is otherwise asymptomatic, no intervention required  - [facility] HIE reviewed, patient had a [facility] urgent care visit 9/17 where he was tested for COVID, though result not seen in HIE   - chart note reviewed, [facility]S reports confirmation of COVID+ result on 9/17 performed at [facility] Skin Scan thus patient had already completed quarantine and no longer needs isolation as per facility protocols    # open wound of abdominal wall, s/p STSG to abdomen, wound vac placement on 9/29  - reports pain medications helping to manage abdominal and skin graft pain  - has abdominal binder in place  - c/w pain management, wound care and post-op management as per PRS/Surgery  - bowel regimen to prevent opioid induced constipation  - would be judicious re: use of NSAIDS in elderly patients, on ppi for GERD    # HFrEF (EF 30-35%), VT, s/p AICD, AS, s/p TAVR, CAD s/p stents  - denies chest pain, clinically euvolemic on exam, respiratory status stable on RA  - c/w amiodarone for hx VT  - c/w lasix, toprol, entresto for chronic HFrEF  - consider periodic bloodwork to monitor renal function/lytes as is on diuretic (can be monitored at rehab)  - continue hold parameters on medications, monitor BP, currently in acceptable range (SBP 120s)    # Pancreatic cancer  - c/w creon, otpt f/u    # Hypothyroid  - c/w synthroid    # Anxiety  - on seroquel, cymbalta, with xanax prn,     # BPH  - c/w flomax  -s/p TOV, monitor UOP, patient reports he is voiding    Need for prophylactic measure  VTE ppx: lovenox as per PRS/Sx    Dispo: d/c planning to [facility]

## 2022-10-11 PROBLEM — T81.89XD OTHER COMPLICATIONS OF PROCEDURES, NOT ELSEWHERE CLASSIFIED, SUBSEQUENT ENCOUNTER: Chronic | Status: ACTIVE | Noted: 2022-01-01

## 2022-10-24 NOTE — ED PROVIDER NOTE - PHYSICAL EXAMINATION
Gen: WDWN, NAD  HEENT: EOMI, no nasal discharge, mucous membranes moist  CV: 2+ radial pulses R radial  Resp: no accessory muscle use, no increased work of breathing  GI: Abdomen soft non-distended, NTTP. + midline abdominal incision with skin graft, hemostatic, well healing without skin induration or purulent drainage.   MSK: no bruising, no LE edema  Neuro: A&Ox4, following commands, moving all four extremities spontaneously  Psych: appropriate mood

## 2022-10-24 NOTE — ED ADULT NURSE NOTE - CHIEF COMPLAINT QUOTE
Pt. sent from [facility] for bleeding to abdominal bandage this AM. Pt. had Whipple procedure approx. 2-3wks ago. c/o abdominal pain and bleeding last night to left thigh where skin graft was done.

## 2022-10-24 NOTE — ED ADULT NURSE NOTE - OBJECTIVE STATEMENT
A04 50 y/o male presents to the ED for bleeding of the surgical site. Patient had a whipple procedure done for pancreatic cancer and then had a wound vac placed because it wasn't healing. Patient now has wound vac removed and has been having drainage of the site. Patient also reports bleeding of the graft site on the left leg. Wound has now stopped draining. Patient denies abdominal pain, nausea, vomiting, chest pain, shortness of breath and fever.

## 2022-10-24 NOTE — ED PROVIDER NOTE - NSICDXPASTMEDICALHX_GEN_ALL_CORE_FT
PAST MEDICAL HISTORY:  Acute osteomyelitis     Cardiomyopathy     GERD (gastroesophageal reflux disease)     HLD (hyperlipidemia)     [place] (hard of hearing) B/L hearing aids    HTN (hypertension)     MSSA bacteremia 9/2021    Other complications of procedures, not elsewhere classified, subsequent encounter     Pancreas cancer chemo, s/p Whipple    Pulmonary embolism

## 2022-10-24 NOTE — H&P ADULT - ASSESSMENT
This is a 82 yo M PMH HTN, HLD, HFrEF s/p ACID, AS s/p TAVR, CAD s/p stents, pancreatic ca s/p chemo and radiation therapy s/p whipple on 10/26/21 with resulting non-healing abdominal wound treated with repeat STSG and vac (7/8/22, 9/29/22) both with poor take, now treated with local wound care. Currently presents to the ED for bleeding of the abdominal wound. There is no appreciable bleeding from the abdominal wound or STSG donor site. H/H appears stable since prior to latest admission.    - f/u labs  - admit to [facility]S (Dr. Gallegos)  - Will place integra and wound vac to abdomen  - xeroform on donor site

## 2022-10-24 NOTE — CONSULT NOTE ADULT - SUBJECTIVE AND OBJECTIVE BOX
Plastic Surgery Consult Note  (pg [facility]: [id number], NS: [phone])    HPI:    80 yo M w/ HFrEF (EF 30-35%), VT, s/p ACID, AS s/p TAVR, CAD s/p stents, GERD, hypothyroid, anxiety, pancreatic ca s/p whipple 10/26/21, chemo and xrt with non-healing abdominal wound treated w/ HBOT and STSG 7/8/22, s/p repeat STSG wound vac placement on 9/29 resulting in poor take at discharge, p/w bleeding from abdominal incision site. pt endorses bleeding 1d prior, resolved today. denies abd pain. pt also endorses bleeding from L thigh graft site, although appears hemostatic in the ED. denies fevers, chills, n/v, diarrhea, dysuria.      PAST MEDICAL & SURGICAL HISTORY:  HTN (hypertension)      HLD (hyperlipidemia)      GERD (gastroesophageal reflux disease)      Cardiomyopathy      MSSA bacteremia  9/2021      Acute osteomyelitis      Pulmonary embolism      Pancreas cancer  chemo, s/p Whipple      [place] (hard of hearing)  B/L hearing aids      Other complications of procedures, not elsewhere classified, subsequent encounter      History of total hip replacement  left X 1, 2 revisions      History of laminectomy  X3      ICD (implantable cardiac defibrillator) in place  implanted 2005, replaced 10/4/2021 [employer] Subcutaneous ICD      Benign testicular tumor  radiation      Status post amputation of toe of right foot  8/2021      Status post fracture of femur  2017 left with hardware      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      H/O Whipple procedure  2/2021, 10/2021        Allergies    No Known Allergies    Intolerances      Home Medications:  amiodarone 200 mg oral tablet: 1 tab(s) orally once a day AM (29 Sep 2022 14:20)  Creon 24,000 units oral delayed release capsule: 3 cap(s) orally 3 times a day (29 Sep 2022 14:20)  DULoxetine 60 mg oral delayed release capsule: 1 cap(s) orally once a day (at bedtime) (29 Sep 2022 14:20)  Entresto 49 mg-51 mg oral tablet: 1 tab(s) orally 2 times a day (29 Sep 2022 14:20)  gabapentin 100 mg oral tablet: 1 tab(s) orally 3 times a day, As Needed (29 Sep 2022 14:20)  Lasix 20 mg oral tablet: 1 tab(s) orally once a day AM (29 Sep 2022 14:20)  levothyroxine 25 mcg (0.025 mg) oral tablet: 1 tab(s) orally once a day AM (29 Sep 2022 14:20)  Metoprolol Succinate ER 50 mg oral tablet, extended release: 1 tab(s) orally once a day AM (29 Sep 2022 14:20)  pantoprazole 40 mg oral delayed release tablet: 1 tab(s) orally once a day AM (29 Sep 2022 14:20)  petrolatum topical ointment: 1 application topically once a day (07 Oct 2022 10:36)  QUEtiapine 25 mg oral tablet: 1 tab(s) orally once a day (at bedtime) (29 Sep 2022 14:20)  tamsulosin 0.4 mg oral capsule: 1 cap(s) orally once a day (at bedtime) (29 Sep 2022 14:20)  Xanax 0.25 mg oral tablet: 1 tab(s) orally once a day, As Needed (29 Sep 2022 14:20)    MEDICATIONS  (STANDING):      SOCIAL HISTORY:  FAMILY HISTORY:  Family history of stroke (Mother)        ___________________________________________  OBJECTIVE:  Vital Signs Last 24 Hrs  T(C): 36.8 (24 Oct 2022 11:10), Max: 36.8 (24 Oct 2022 11:10)  T(F): 98.3 (24 Oct 2022 11:10), Max: 98.3 (24 Oct 2022 11:10)  HR: 65 (24 Oct 2022 11:10) (65 - 65)  BP: 122/59 (24 Oct 2022 11:10) (122/59 - 122/59)  BP(mean): --  RR: 16 (24 Oct 2022 11:10) (16 - 16)  SpO2: 100% (24 Oct 2022 11:10) (100% - 100%)    Parameters below as of 24 Oct 2022 11:10  Patient On (Oxygen Delivery Method): room air    CAPILLARY BLOOD GLUCOSE        I&O's Detail    General: Well developed, well nourished, NAD  Neuro: Alert and oriented, moves all extremities spontaneously  Respiratory: Airway patent, respirations unlabored  CVS: Regular rate and rhythm  Abdomen: Soft, nondistended, abdominal wound with pink granulation tissue, some appreciable fibrinous exudate  Skin: Warm, dry, appropriate color  ____________________________________________  LABS:  CBC Full  -  ( 24 Oct 2022 12:52 )  WBC Count : 3.13 K/uL  RBC Count : 3.04 M/uL  Hemoglobin : 8.8 g/dL  Hematocrit : 27.8 %  Platelet Count - Automated : 169 K/uL  Mean Cell Volume : 91.4 fL  Mean Cell Hemoglobin : 28.9 pg  Mean Cell Hemoglobin Concentration : 31.7 gm/dL  Auto Neutrophil # : x  Auto Lymphocyte # : x  Auto Monocyte # : x  Auto Eosinophil # : x  Auto Basophil # : x  Auto Neutrophil % : x  Auto Lymphocyte % : x  Auto Monocyte % : x  Auto Eosinophil % : x  Auto Basophil % : x                      ____________________________________________  MICRO:  RECENT CULTURES:    ____________________________________________  RADIOLOGY:   Plastic Surgery Consult Note  (pg [facility]: [id number], NS: [phone])    HPI:    82 yo M w/ HTN, HLD, HFrEF (EF 30-35%), VT, s/p ACID, AS s/p TAVR, CAD s/p stents, GERD, hypothyroid, anxiety, pancreatic ca s/p whipple 10/26/21, chemo and xrt with non-healing abdominal wound treated w/ HBOT and STSG 7/8/22, s/p repeat STSG wound vac placement on 9/29 resulting in poor take at discharge, p/w bleeding from abdominal incision site. pt endorses bleeding 1d prior, resolved today. denies abd pain. pt also endorses bleeding from L thigh graft site, although appears hemostatic in the ED. denies fevers, chills, n/v, diarrhea, dysuria.      PAST MEDICAL & SURGICAL HISTORY:  HTN (hypertension)      HLD (hyperlipidemia)      GERD (gastroesophageal reflux disease)      Cardiomyopathy      MSSA bacteremia  9/2021      Acute osteomyelitis      Pulmonary embolism      Pancreas cancer  chemo, s/p Whipple      [place] (hard of hearing)  B/L hearing aids      Other complications of procedures, not elsewhere classified, subsequent encounter      History of total hip replacement  left X 1, 2 revisions      History of laminectomy  X3      ICD (implantable cardiac defibrillator) in place  implanted 2005, replaced 10/4/2021 [employer] Subcutaneous ICD      Benign testicular tumor  radiation      Status post amputation of toe of right foot  8/2021      Status post fracture of femur  2017 left with hardware      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      H/O Whipple procedure  2/2021, 10/2021        Allergies    No Known Allergies    Intolerances      Home Medications:  amiodarone 200 mg oral tablet: 1 tab(s) orally once a day AM (29 Sep 2022 14:20)  Creon 24,000 units oral delayed release capsule: 3 cap(s) orally 3 times a day (29 Sep 2022 14:20)  DULoxetine 60 mg oral delayed release capsule: 1 cap(s) orally once a day (at bedtime) (29 Sep 2022 14:20)  Entresto 49 mg-51 mg oral tablet: 1 tab(s) orally 2 times a day (29 Sep 2022 14:20)  gabapentin 100 mg oral tablet: 1 tab(s) orally 3 times a day, As Needed (29 Sep 2022 14:20)  Lasix 20 mg oral tablet: 1 tab(s) orally once a day AM (29 Sep 2022 14:20)  levothyroxine 25 mcg (0.025 mg) oral tablet: 1 tab(s) orally once a day AM (29 Sep 2022 14:20)  Metoprolol Succinate ER 50 mg oral tablet, extended release: 1 tab(s) orally once a day AM (29 Sep 2022 14:20)  pantoprazole 40 mg oral delayed release tablet: 1 tab(s) orally once a day AM (29 Sep 2022 14:20)  petrolatum topical ointment: 1 application topically once a day (07 Oct 2022 10:36)  QUEtiapine 25 mg oral tablet: 1 tab(s) orally once a day (at bedtime) (29 Sep 2022 14:20)  tamsulosin 0.4 mg oral capsule: 1 cap(s) orally once a day (at bedtime) (29 Sep 2022 14:20)  Xanax 0.25 mg oral tablet: 1 tab(s) orally once a day, As Needed (29 Sep 2022 14:20)    MEDICATIONS  (STANDING):      SOCIAL HISTORY:  FAMILY HISTORY:  Family history of stroke (Mother)        ___________________________________________  OBJECTIVE:  Vital Signs Last 24 Hrs  T(C): 36.8 (24 Oct 2022 11:10), Max: 36.8 (24 Oct 2022 11:10)  T(F): 98.3 (24 Oct 2022 11:10), Max: 98.3 (24 Oct 2022 11:10)  HR: 65 (24 Oct 2022 11:10) (65 - 65)  BP: 122/59 (24 Oct 2022 11:10) (122/59 - 122/59)  BP(mean): --  RR: 16 (24 Oct 2022 11:10) (16 - 16)  SpO2: 100% (24 Oct 2022 11:10) (100% - 100%)    Parameters below as of 24 Oct 2022 11:10  Patient On (Oxygen Delivery Method): room air    CAPILLARY BLOOD GLUCOSE        I&O's Detail    General: Well developed, well nourished, NAD  Neuro: Alert and oriented, moves all extremities spontaneously  Respiratory: Airway patent, respirations unlabored  CVS: Regular rate and rhythm  Abdomen: Soft, nondistended, abdominal wound with pink granulation tissue, some appreciable fibrinous exudate  Skin: Warm, dry, appropriate color  ____________________________________________  LABS:  CBC Full  -  ( 24 Oct 2022 12:52 )  WBC Count : 3.13 K/uL  RBC Count : 3.04 M/uL  Hemoglobin : 8.8 g/dL  Hematocrit : 27.8 %  Platelet Count - Automated : 169 K/uL  Mean Cell Volume : 91.4 fL  Mean Cell Hemoglobin : 28.9 pg  Mean Cell Hemoglobin Concentration : 31.7 gm/dL  Auto Neutrophil # : x  Auto Lymphocyte # : x  Auto Monocyte # : x  Auto Eosinophil # : x  Auto Basophil # : x  Auto Neutrophil % : x  Auto Lymphocyte % : x  Auto Monocyte % : x  Auto Eosinophil % : x  Auto Basophil % : x                      ____________________________________________  MICRO:  RECENT CULTURES:    ____________________________________________  RADIOLOGY:

## 2022-10-24 NOTE — ED PROVIDER NOTE - OBJECTIVE STATEMENT
82 yo M w/ HFrEF (EF 30-35%), VT, s/p ACID, AS s/p TAVR, CAD s/p stents, GERD, hypothyroid, anxiety, pancreatic ca s/p whipple 10/26/21, chemo and xrt with non-healing abdominal wound treated w/ HBOT and skin graft 7/8/22, s/p revision/wound vac placement on 9/29, p/w bleeding from abdominal incision site. pt endorses bleeding 1d prior, resolved today. denies abd pain. pt also endorses bleeding from L thigh graft site, although appears hemostatic in the ED. denies fevers, chills, n/v, diarrhea, dysuria.

## 2022-10-24 NOTE — CONSULT NOTE ADULT - ASSESSMENT
*Incomplete note*   *Incomplete note*    This is a 82 yo M PMH HTN, HLD, HFrEF s/p ACID, AS s/p TAVR, CAD s/p stents, pancreatic ca s/p chemo and radiation therapy s/p whipple on 10/26/21 with resulting non-healing abdominal wound treated with repeat STSG and vac (7/8/22, 9/29/22) both with poor take, now treated with local wound care. Currently presents to the ED for bleeding of the abdominal wound. There is no appreciable bleeding from the abdominal wound or STSG donor site. H/H appears stable since prior to latest admission.    - Integra with black sponge vac to wound  - f/u labs  - remainder of plan pending per attending

## 2022-10-24 NOTE — H&P ADULT - HISTORY OF PRESENT ILLNESS
Plastic Surgery Consult Note  (pg [facility]: [id number], [facility]: [phone])    HPI:   80 yo M w/ HTN, HLD, HFrEF (EF 30-35%), VT, s/p ACID, AS s/p TAVR, CAD s/p stents, GERD, hypothyroid, anxiety, pancreatic ca s/p whipple 10/26/21, chemo and xrt with non-healing abdominal wound treated w/ HBOT and STSG 7/8/22, s/p repeat STSG wound vac placement on 9/29 resulting in poor take at discharge, p/w bleeding from abdominal incision site. pt endorses bleeding 1d prior, resolved today. denies abd pain. pt also endorses bleeding from L thigh graft site, although appears hemostatic in the ED. denies fevers, chills, n/v, diarrhea, dysuria.      PAST MEDICAL & SURGICAL HISTORY:  HTN (hypertension)      HLD (hyperlipidemia)      GERD (gastroesophageal reflux disease)      Cardiomyopathy      MSSA bacteremia  9/2021      Acute osteomyelitis      Pulmonary embolism      Pancreas cancer  chemo, s/p Whipple      [place] (hard of hearing)  B/L hearing aids      Other complications of procedures, not elsewhere classified, subsequent encounter      History of total hip replacement  left X 1, 2 revisions      History of laminectomy  X3      ICD (implantable cardiac defibrillator) in place  implanted 2005, replaced 10/4/2021 [employer] Subcutaneous ICD      Benign testicular tumor  radiation      Status post amputation of toe of right foot  8/2021      Status post fracture of femur  2017 left with hardware      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      H/O Whipple procedure  2/2021, 10/2021        Allergies    No Known Allergies    Intolerances      Home Medications:  amiodarone 200 mg oral tablet: 1 tab(s) orally once a day AM (29 Sep 2022 14:20)  Creon 24,000 units oral delayed release capsule: 3 cap(s) orally 3 times a day (29 Sep 2022 14:20)  DULoxetine 60 mg oral delayed release capsule: 1 cap(s) orally once a day (at bedtime) (29 Sep 2022 14:20)  Entresto 49 mg-51 mg oral tablet: 1 tab(s) orally 2 times a day (29 Sep 2022 14:20)  gabapentin 100 mg oral tablet: 1 tab(s) orally 3 times a day, As Needed (29 Sep 2022 14:20)  Lasix 20 mg oral tablet: 1 tab(s) orally once a day AM (29 Sep 2022 14:20)  levothyroxine 25 mcg (0.025 mg) oral tablet: 1 tab(s) orally once a day AM (29 Sep 2022 14:20)  Metoprolol Succinate ER 50 mg oral tablet, extended release: 1 tab(s) orally once a day AM (29 Sep 2022 14:20)  pantoprazole 40 mg oral delayed release tablet: 1 tab(s) orally once a day AM (29 Sep 2022 14:20)  petrolatum topical ointment: 1 application topically once a day (07 Oct 2022 10:36)  QUEtiapine 25 mg oral tablet: 1 tab(s) orally once a day (at bedtime) (29 Sep 2022 14:20)  tamsulosin 0.4 mg oral capsule: 1 cap(s) orally once a day (at bedtime) (29 Sep 2022 14:20)  Xanax 0.25 mg oral tablet: 1 tab(s) orally once a day, As Needed (29 Sep 2022 14:20)    MEDICATIONS  (STANDING):      SOCIAL HISTORY:  FAMILY HISTORY:  Family history of stroke (Mother)        ___________________________________________  OBJECTIVE:  Vital Signs Last 24 Hrs  T(C): 36.8 (24 Oct 2022 11:10), Max: 36.8 (24 Oct 2022 11:10)  T(F): 98.3 (24 Oct 2022 11:10), Max: 98.3 (24 Oct 2022 11:10)  HR: 65 (24 Oct 2022 11:10) (65 - 65)  BP: 122/59 (24 Oct 2022 11:10) (122/59 - 122/59)  BP(mean): --  RR: 16 (24 Oct 2022 11:10) (16 - 16)  SpO2: 100% (24 Oct 2022 11:10) (100% - 100%)    Parameters below as of 24 Oct 2022 11:10  Patient On (Oxygen Delivery Method): room air

## 2022-10-24 NOTE — H&P ADULT - NSHPPHYSICALEXAM_GEN_ALL_CORE
General: Well developed, well nourished, NAD  Neuro: Alert and oriented, moves all extremities spontaneously  Respiratory: Airway patent, respirations unlabored  CVS: Regular rate and rhythm  Abdomen: Soft, nondistended, abdominal wound with pink granulation tissue, some appreciable fibrinous exudate  Skin: Warm, dry, appropriate color

## 2022-10-24 NOTE — ED PROVIDER NOTE - NSICDXPASTSURGICALHX_GEN_ALL_CORE_FT
PAST SURGICAL HISTORY:  Benign testicular tumor radiation    H/O Whipple procedure 2/2021, 10/2021    History of laminectomy X3    History of total hip replacement left X 1, 2 revisions    ICD (implantable cardiac defibrillator) in place implanted 2005, replaced 10/4/2021 [employer] Subcutaneous ICD    S/P TAVR (transcatheter aortic valve replacement) 7/2021    Status post amputation of toe of right foot 8/2021    Status post fracture of femur 2017 left with hardware

## 2022-10-24 NOTE — ED PROVIDER NOTE - ATTENDING CONTRIBUTION TO CARE
81M h/o HFrEF (EF 30-35%), VT s/p ACID, AS s/p TAVR, CAD s/p stents, GERD, hypothyroid, anxiety, pancreatic Ca s/p whipple 10/26/21, chemo and RTx with non-healing abdominal wound treated w/ HBOT and skin graft 7/8/22, s/p revision/wound vac placement on 9/29, p/w bleeding from abdominal incision site. States blood soaked through dressing overnight. Also endorses bleeding at L thigh graft site. Pt without other complaints  Gen: nad  CV: rrr, no appreciable murmur  Pulm: clear lungs  Abd: soft, ntnd  Ext: no edema/swelling  Skin: midline abd incision site with graft in place, no active bleeding or evidence of previous bleeding, appears well healing overall; L thigh graft site with slight abrasion to proximal aspect, otherwise no evidence of recent/active bleeding  MDM: 81M history as above p/w report of bleeding from abdominal and L thigh surgical sites though no evidence of bleeding at time of ED presentation, dressings clean - will check coags, have plastics assess wounds to determine if any additional interventions required

## 2022-10-24 NOTE — ED ADULT TRIAGE NOTE - CHIEF COMPLAINT QUOTE
Pt. sent from [facility] for bleeding to abdominal bandage this AM. Pt. had Whipple procedure approx. 2-3wks ago. c/o abdominal pain and bleeding last night to left thigh where skin graft was done.

## 2022-10-24 NOTE — ED PROVIDER NOTE - CLINICAL SUMMARY MEDICAL DECISION MAKING FREE TEXT BOX
Vickie Gomes MD, PGY-3: 80 yo M w/ HFrEF (EF 30-35%), VT, s/p ACID, AS s/p TAVR, CAD s/p stents, GERD, hypothyroid, anxiety, pancreatic ca s/p whipple 10/26/21, chemo and xrt with non-healing abdominal wound treated w/ HBOT and skin graft 7/8/22, s/p revision/wound vac placement on 9/29, p/w bleeding from abdominal incision site x 1d, currently hemostatic. wound appears well healing, no infection concerns. plan for basic labs to r/o infection, plastics vs. surgery c/s.

## 2022-10-24 NOTE — H&P ADULT - CLICK TO LAUNCH ORM
Problem: PAIN - ADULT  Goal: Verbalizes/displays adequate comfort level or patient's stated pain goal  Description: INTERVENTIONS:  - Encourage pt to monitor pain and request assistance  - Assess pain using appropriate pain scale  - Administer analgesics interventions  Outcome: Progressing     Problem: POSTPARTUM  Goal: Long Term Goal:Experiences normal postpartum course  Description: INTERVENTIONS:  - Assess and monitor vital signs and lab values. - Assess fundus and lochia.   - Provide ice/sitz baths for Assess and monitor for signs of nipple pain/trauma. - Instruct and provide assistance with proper latch. - Review techniques for milk expression (breast pumping) and storage of breast milk.  Provide pumping equipment/supplies, instructions and assistance, .

## 2022-10-24 NOTE — H&P ADULT - NSHPLABSRESULTS_GEN_ALL_CORE
LABS:  CBC Full  -  ( 24 Oct 2022 12:52 )  WBC Count : 3.13 K/uL  RBC Count : 3.04 M/uL  Hemoglobin : 8.8 g/dL  Hematocrit : 27.8 %  Platelet Count - Automated : 169 K/uL  Mean Cell Volume : 91.4 fL  Mean Cell Hemoglobin : 28.9 pg  Mean Cell Hemoglobin Concentration : 31.7 gm/dL  Auto Neutrophil # : x  Auto Lymphocyte # : x  Auto Monocyte # : x  Auto Eosinophil # : x  Auto Basophil # : x  Auto Neutrophil % : x  Auto Lymphocyte % : x  Auto Monocyte % : x  Auto Eosinophil % : x  Auto Basophil % : x    CAPILLARY BLOOD GLUCOSE          10-24    138  |  103  |  8   ----------------------------<  161<H>  4.2   |  26  |  0.63    Ca    8.7      24 Oct 2022 12:52    TPro  6.5  /  Alb  2.9<L>  /  TBili  0.5  /  DBili  x   /  AST  22  /  ALT  11  /  AlkPhos  107  10-24    LIVER FUNCTIONS - ( 24 Oct 2022 12:52 )  Alb: 2.9 g/dL / Pro: 6.5 g/dL / ALK PHOS: 107 U/L / ALT: 11 U/L / AST: 22 U/L / GGT: x

## 2022-10-25 NOTE — DISCHARGE NOTE PROVIDER - NSDCCPCAREPLAN_GEN_ALL_CORE_FT
PRINCIPAL DISCHARGE DIAGNOSIS  Diagnosis: Pancreatic cancer  Assessment and Plan of Treatment: You have pancreatic cancer, and you were ssen by the hematology/oncology team and you had an endoscopic biopsy done.      SECONDARY DISCHARGE DIAGNOSES  Diagnosis: Open abdominal wall wound  Assessment and Plan of Treatment: You had an open abdominal wound, and you were seen by plastic surgery; You initially had a wound vac, which has now been removed, and you are getting dressing changes. Please continue wound care as directed, and follow up with the plastic surgeon outpatient    Diagnosis: Hypertension  Assessment and Plan of Treatment: Continue blood pressure medication regimen as directed. Monitor for any visual changes, headaches or dizziness.  Monitor blood pressure regularly.  Follow up with your primary care provider for further management for high blood pressure.     PRINCIPAL DISCHARGE DIAGNOSIS  Diagnosis: Pancreatic cancer  Assessment and Plan of Treatment: You have pancreatic cancer, and you were roxanne by the hematology/oncology team and you had an endoscopic biopsy done., which showed a pancreatic mass consistent  with  Adenocarcinoma, suggestive of recurred disease. CT chest scan was done to determine staging.-- You will need to follow up with the oncologist outaptient for further management         SECONDARY DISCHARGE DIAGNOSES  Diagnosis: Open abdominal wall wound  Assessment and Plan of Treatment: You had an open abdominal wound, and you were seen by plastic surgery; You initially had a wound vac, which has now been removed, and you are getting dressing changes. Please continue wound care as directed, and follow up with the plastic surgeon outpatient. Please return to the ER if you have discharge from the wound, bleeding, severe pain.    Diagnosis: Anemia  Assessment and Plan of Treatment: You had anemia in the hospital, which is when the hemoglobin in your blood is too low. You had blood transfusions in the hospital. Follow-up with your outpatient provider for further monitoring of your blood counts.   Monitor for signs/symptoms indicating worsening of disease, such as, easy bleeding/bruising, pale skin, fatigue, dizziness, increased heart rate, or chest pain.    Diagnosis: Chronic systolic congestive heart failure  Assessment and Plan of Treatment: Continue with all of your medications as prescribed, and follow up with your cardiologist outpatient    Diagnosis: Hypertension  Assessment and Plan of Treatment: Continue blood pressure medication regimen as directed. Monitor for any visual changes, headaches or dizziness.  Monitor blood pressure regularly.  Follow up with your primary care provider for further management for high blood pressure.     PRINCIPAL DISCHARGE DIAGNOSIS  Diagnosis: Pancreatic cancer  Assessment and Plan of Treatment: You have pancreatic cancer, and you were roxanne by the hematology/oncology team and you had an endoscopic biopsy done., which showed a pancreatic mass consistent  with  Adenocarcinoma, suggestive of recurred disease. CT chest scan was done to determine staging.--It showed a 4 mm right apical nodule, which is increased in size since 6/16/2022 when it measured 2 mm. A follow-up CT chest is recommended in 3 months  You will need to follow up with the oncologist outaptient for further management         SECONDARY DISCHARGE DIAGNOSES  Diagnosis: Open abdominal wall wound  Assessment and Plan of Treatment: You had an open abdominal wound, and you were seen by plastic surgery; You initially had a wound vac, which has now been removed, and you are getting dressing changes. Please continue wound care as directed, and follow up with the plastic surgeon outpatient. Please return to the ER if you have discharge from the wound, bleeding, severe pain.    Diagnosis: Anemia  Assessment and Plan of Treatment: You had anemia in the hospital, which is when the hemoglobin in your blood is too low. You had blood transfusions in the hospital. Follow-up with your outpatient provider for further monitoring of your blood counts.   Monitor for signs/symptoms indicating worsening of disease, such as, easy bleeding/bruising, pale skin, fatigue, dizziness, increased heart rate, or chest pain.    Diagnosis: Chronic systolic congestive heart failure  Assessment and Plan of Treatment: Continue with all of your medications as prescribed, and follow up with your cardiologist outpatient    Diagnosis: Hypertension  Assessment and Plan of Treatment: Continue blood pressure medication regimen as directed. Monitor for any visual changes, headaches or dizziness.  Monitor blood pressure regularly.  Follow up with your primary care provider for further management for high blood pressure.     PRINCIPAL DISCHARGE DIAGNOSIS  Diagnosis: Pancreatic cancer  Assessment and Plan of Treatment: You have pancreatic cancer, and you were roxanne by the hematology/oncology team and you had an endoscopic biopsy done., which showed a pancreatic mass consistent  with  Adenocarcinoma, suggestive of recurred disease. CT chest scan was done to determine staging.--It showed a 4 mm right apical nodule, which is increased in size since 6/16/2022 when it measured 2 mm. A follow-up CT chest is recommended in 3 months  You will need to follow up with the oncologist outaptient for further management         SECONDARY DISCHARGE DIAGNOSES  Diagnosis: Open abdominal wall wound  Assessment and Plan of Treatment: You had an open abdominal wound, and you were seen by plastic surgery; You initially had a wound vac, which has now been removed, and you are getting dressing changes. Please continue wound care as directed (daily adaptic and ABD dressing changes to abdominal wound), and follow up with the plastic surgeon outpatient. Please return to the ER if you have discharge from the wound, bleeding, severe pain.    Diagnosis: Anemia  Assessment and Plan of Treatment: You had anemia in the hospital, which is when the hemoglobin in your blood is too low. You had blood transfusions in the hospital. Follow-up with your outpatient provider for further monitoring of your blood counts.   Monitor for signs/symptoms indicating worsening of disease, such as, easy bleeding/bruising, pale skin, fatigue, dizziness, increased heart rate, or chest pain.    Diagnosis: Chronic systolic congestive heart failure  Assessment and Plan of Treatment: Continue with all of your medications as prescribed, and follow up with your cardiologist outpatient    Diagnosis: Hypertension  Assessment and Plan of Treatment: Continue blood pressure medication regimen as directed. Monitor for any visual changes, headaches or dizziness.  Monitor blood pressure regularly.  Follow up with your primary care provider for further management for high blood pressure.     PRINCIPAL DISCHARGE DIAGNOSIS  Diagnosis: Pancreatic cancer  Assessment and Plan of Treatment: You have pancreatic cancer, and you were roxanne by the hematology/oncology team and you had an endoscopic biopsy done., which showed a pancreatic mass consistent  with  Adenocarcinoma, suggestive of recurred disease. CT chest scan was done to determine staging.--It showed a 4 mm right apical nodule, which is increased in size since 6/16/2022 when it measured 2 mm. A follow-up CT chest is recommended in 3 months  You will need to follow up with the oncologist outptient for further management         SECONDARY DISCHARGE DIAGNOSES  Diagnosis: Open abdominal wall wound  Assessment and Plan of Treatment: You had an open abdominal wound, and you were seen by plastic surgery; You initially had a wound vac, which has now been removed, and you are getting dressing changes. Please continue wound care as directed (daily adaptic and ABD dressing changes to abdominal wound), and follow up with the plastic surgeon outpatient. Please return to the ER if you have discharge from the wound, bleeding, severe pain.    Diagnosis: Anemia  Assessment and Plan of Treatment: You had anemia in the hospital, which is when the hemoglobin in your blood is too low. You had blood transfusions in the hospital. Follow-up with your outpatient provider for further monitoring of your blood counts.   Monitor for signs/symptoms indicating worsening of disease, such as, easy bleeding/bruising, pale skin, fatigue, dizziness, increased heart rate, or chest pain.    Diagnosis: Chronic systolic congestive heart failure  Assessment and Plan of Treatment: We stopped your entresto, metoprolol, lasix becuase your blood pressure was controlled and you were not in acute heart failure-- please follow up with your cardiologist outpatient to determine when you can resume these medications    Diagnosis: Hypertension  Assessment and Plan of Treatment: Continue blood pressure medication regimen as directed. Monitor for any visual changes, headaches or dizziness.  Monitor blood pressure regularly.  Follow up with your primary care provider for further management for high blood pressure.

## 2022-10-25 NOTE — CONSULT NOTE ADULT - ATTENDING COMMENTS
Agree with plan stated above. Briefly this a 80 yo M w/ HTN, HLD, HFrEF (EF 30-35%), VT, s/p ACID, AS s/p TAVR, CAD s/p stents, GERD, hypothyroid, anxiety, pancreatic ca s/p whipple 10/26/21, chemo and xrt with non-healing abdominal wound who presents from rehab facility for concern for bleeding from skin graft site. Medicine was consulted for comanagement of comorbidities. Patient recently admitted for skin graft revision. Wound care per surgery recs, only with small amount of output, approximately 50 cc output, no bloody discharge noted on exam.  Patient noted to have some slight hypotension to mid 90s, patient with decreased PO intake, would encourage to eat and drink more. Able to move all extremities per my assessment. Patient denies any abdominal pain, nausea, vomiting or fevers. Medical issues as stated above.     Per surgery resident, plan to discharge patient on 10/26 given decreased output from drain.     Rest of plan as stated above. Discussed with Dr. Murray.

## 2022-10-25 NOTE — PATIENT PROFILE ADULT - FALL HARM RISK - HARM RISK INTERVENTIONS
Assistance with ambulation/Assistance OOB with selected safe patient handling equipment/Communicate Risk of Fall with Harm to all staff/Discuss with provider need for PT consult/Monitor gait and stability/Provide patient with walking aids - walker, cane, crutches/Reinforce activity limits and safety measures with patient and family/Sit up slowly, dangle for a short time, stand at bedside before walking/Tailored Fall Risk Interventions/Use of alarms - bed, chair and/or voice tab/Visual Cue: Yellow wristband and red socks/Bed in lowest position, wheels locked, appropriate side rails in place/Call bell, personal items and telephone in reach/Instruct patient to call for assistance before getting out of bed or chair/Non-slip footwear when patient is out of bed/[place] to call system/Physically safe environment - no spills, clutter or unnecessary equipment/Purposeful Proactive Rounding/Room/bathroom lighting operational, light cord in reach

## 2022-10-25 NOTE — PROGRESS NOTE ADULT - SUBJECTIVE AND OBJECTIVE BOX
Plastic Surgery Progress Note (pg [facility]: [id number], [facility]: [phone])    SUBJECTIVE  vital signs stable and afebrile overnight. The patient was seen and examined. No acute events overnight.    OBJECTIVE  ___________________________________________________  VITAL SIGNS / I&O's   Vital Signs Last 24 Hrs  T(C): 36.2 (25 Oct 2022 06:00), Max: 37 (24 Oct 2022 16:57)  T(F): 97.1 (25 Oct 2022 06:00), Max: 98.6 (24 Oct 2022 16:57)  HR: 60 (25 Oct 2022 06:00) (60 - 80)  BP: 94/44 (25 Oct 2022 06:00) (94/44 - 129/62)  BP(mean): --  RR: 17 (25 Oct 2022 06:00) (16 - 20)  SpO2: 95% (25 Oct 2022 06:00) (95% - 100%)    Parameters below as of 25 Oct 2022 01:00  Patient On (Oxygen Delivery Method): room air          24 Oct 2022 07:01  -  25 Oct 2022 07:00  --------------------------------------------------------  IN:  Total IN: 0 mL    OUT:    VAC (Vacuum Assisted Closure) System (mL): 50 mL  Total OUT: 50 mL    Total NET: -50 mL        ___________________________________________________  PHYSICAL EXAM    Physical Exam: General: Well developed, well nourished, NAD  Neuro: Alert and oriented, moves all extremities spontaneously  Respiratory: Airway patent, respirations unlabored  CVS: Regular rate and rhythm  Abdomen: Soft, nondistended, wound vac in place with good suction.   Skin: Warm, dry, appropriate color    ___________________________________________________  LABS                        8.8    3.13  )-----------( 169      ( 24 Oct 2022 12:52 )             27.8     25 Oct 2022 05:52    138    |  106    |  10     ----------------------------<  98     4.3     |  20     |  0.63     Ca    8.3        25 Oct 2022 05:52  Phos  3.8       25 Oct 2022 05:52  Mg     1.60      25 Oct 2022 05:52    TPro  6.5    /  Alb  2.9    /  TBili  0.5    /  DBili  x      /  AST  22     /  ALT  11     /  AlkPhos  107    24 Oct 2022 12:52      CAPILLARY BLOOD GLUCOSE      POCT Blood Glucose.: 127 mg/dL (24 Oct 2022 18:56)          ___________________________________________________  MICRO  Recent Cultures:    ___________________________________________________  MEDICATIONS  (STANDING):  acetaminophen     Tablet .. 650 milliGRAM(s) Oral every 6 hours  ALPRAZolam 0.25 milliGRAM(s) Oral at bedtime  aMIOdarone    Tablet 200 milliGRAM(s) Oral daily  DULoxetine 60 milliGRAM(s) Oral daily  furosemide    Tablet 20 milliGRAM(s) Oral daily  gabapentin 100 milliGRAM(s) Oral three times a day  heparin   Injectable 5000 Unit(s) SubCutaneous every 12 hours  influenza  Vaccine (HIGH DOSE) 0.7 milliLiter(s) IntraMuscular once  levothyroxine 25 MICROGram(s) Oral daily  metoprolol succinate ER 50 milliGRAM(s) Oral daily  pancrelipase  (CREON 24,000 Lipase Units) 3 Capsule(s) Oral three times a day with meals  pantoprazole    Tablet 40 milliGRAM(s) Oral before breakfast  QUEtiapine 25 milliGRAM(s) Oral at bedtime  sacubitril 49 mG/valsartan 51 mG 1 Tablet(s) Oral two times a day  tamsulosin 0.4 milliGRAM(s) Oral at bedtime    MEDICATIONS  (PRN):  artificial tears (preservative free) Ophthalmic Solution 1 Drop(s) Both EYES every 2 hours PRN Dry Eyes  diphenhydrAMINE 25 milliGRAM(s) Oral every 6 hours PRN Rash and/or Itching  melatonin 3 milliGRAM(s) Oral at bedtime PRN Insomnia  ondansetron Injectable 4 milliGRAM(s) IV Push once PRN Nausea and/or Vomiting  oxyCODONE    IR 5 milliGRAM(s) Oral every 4 hours PRN Moderate Pain (4 - 6)

## 2022-10-25 NOTE — CONSULT NOTE ADULT - SUBJECTIVE AND OBJECTIVE BOX
JUANIS DE SANTIAGO  81y  Male      Patient is a 81y old  Male who presents with a chief complaint of Postoperative wound bleed (25 Oct 2022 11:07)      Medical Co-management  Requested by:    Plastics     Team      HPI:   " 82 yo M w/ HTN, HLD, HFrEF (EF 30-35%), VT, s/p ACID, AS s/p TAVR, CAD s/p stents, GERD, hypothyroid, anxiety, pancreatic ca s/p whipple 10/26/21, chemo and xrt with non-healing abdominal wound treated w/ HBOT and STSG 7/8/22, s/p repeat STSG wound vac placement on 9/29 resulting in poor take at discharge, p/w bleeding from abdominal incision site. pt endorses bleeding 1d prior, resolved today. denies abd pain. pt also endorses bleeding from L thigh graft site, although appears hemostatic in the ED. denies fevers, chills, n/v, diarrhea, dysuria."    Pt confirms came in from rehab for bleeding from abdominal site and pain. Denies bleeding from L thigh graft site. Pt states feels "so-so", endorsing diffuse abdominal pain. No CP, SOB, n/v, focal weakness, LE edema. States urinating ok. Pt unsure why he is quetiapine and states on xanax " for pain". Pt falls asleep numerous time during interview requires prompting to stay awake. Aaox3.    States saw o/p cardiologist Dr. Mercer 3weeks ago and told was doing well.        PAST MEDICAL/SURGICAL HISTORY  PAST MEDICAL & SURGICAL HISTORY:  HTN (hypertension)      HLD (hyperlipidemia)      GERD (gastroesophageal reflux disease)      Cardiomyopathy      MSSA bacteremia  9/2021      Acute osteomyelitis      Pulmonary embolism      Pancreas cancer  chemo, s/p Whipple      [place] (hard of hearing)  B/L hearing aids      Other complications of procedures, not elsewhere classified, subsequent encounter      History of total hip replacement  left X 1, 2 revisions      History of laminectomy  X3      ICD (implantable cardiac defibrillator) in place  implanted 2005, replaced 10/4/2021 [employer] Subcutaneous ICD      Benign testicular tumor  radiation      Status post amputation of toe of right foot  8/2021      Status post fracture of femur  2017 left with hardware      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      H/O Whipple procedure  2/2021, 10/2021            T(C): 36.4 (10-25-22 @ 09:31), Max: 37 (10-24-22 @ 16:57)  HR: 66 (10-25-22 @ 09:31) (60 - 80)  BP: 93/42 (10-25-22 @ 09:31) (93/42 - 129/62)  RR: 18 (10-25-22 @ 09:31) (17 - 20)  SpO2: 94% (10-25-22 @ 09:31) (94% - 100%)  Wt(kg): --Vital Signs Last 24 Hrs  T(C): 36.4 (25 Oct 2022 09:31), Max: 37 (24 Oct 2022 16:57)  T(F): 97.5 (25 Oct 2022 09:31), Max: 98.6 (24 Oct 2022 16:57)  HR: 66 (25 Oct 2022 09:31) (60 - 80)  BP: 93/42 (25 Oct 2022 09:31) (93/42 - 129/62)  BP(mean): --  RR: 18 (25 Oct 2022 09:31) (17 - 20)  SpO2: 94% (25 Oct 2022 09:31) (94% - 100%)    Parameters below as of 25 Oct 2022 09:31  Patient On (Oxygen Delivery Method): room air        PHYSICAL EXAM:  GENERAL: NAD, well-groomed, well-developed, +drowsy, but aaox3, requires frequent prompting to stay awake  NECK: Supple, No JVD  CHEST/LUNG: CTAB; No rales, rhonchi, wheezing, or rubs, breathing comfortably  HEART: Regular rate and rhythm; +systolic murmur  ABDOMEN: Soft, Nontender, Nondistended; + large midline wound with wound vac, area of surrounding erythema, mild tenderness to palpation diffuse  EXTREMITIES:  No clubbing, cyanosis, or edema  LYMPH: No lymphadenopathy noted  NERVOUS SYSTEM:  Alert & Oriented X3, Good concentration;   SKIN: No rashes or lesions + L thigh graft site, with mild erythema - no bleeding    Consultant(s) Notes Reviewed:  [x ] YES  [ ] NO  Care Discussed with Consultants/Other Providers [ x] YES  [ ] NO    LABS:  CBC   10-24-22 @ 12:52  Hematcorit 27.8  Hemoglobin 8.8  Mean Cell Hemoglobin 28.9  Platelet Count-Automated 169  RBC Count 3.04  Red Cell Distrib Width 21.5  Wbc Count 3.13      BMP  10-25-22 @ 05:52  Anion Gap. Serum 12  Blood Urea Nitrogen,Serm 10  Calcium, Total Serum 8.3  Carbon Dioxide, Serum 20  Chloride, Serum 106  Creatinine, Serum 0.63  eGFR in  --  eGFR in Non Afican American --  Gloucose, serum 98  Potassium, Serum 4.3  Sodium, Serum 138              10-24-22 @ 12:52  Anion Gap. Serum 9  Blood Urea Nitrogen,Serm 8  Calcium, Total Serum 8.7  Carbon Dioxide, Serum 26  Chloride, Serum 103  Creatinine, Serum 0.63  eGFR in  --  eGFR in Non Afican American --  Gloucose, serum 161  Potassium, Serum 4.2  Sodium, Serum 138                  CMP  10-25-22 @ 05:52  Diamond Aminotransferase(ALT/SGPT)--  Albumin, Serum --  Alkaline Phosphatase, Serum --  Anion Gap, Serum 12  Aspartate Aminotransferase (AST/SGOT)--  Bilirubin Total, Serum --  Blood Urea Nitrogen, Serum 10  Calcium,Total Serum 8.3  Carbon Dioxide, Serum 20  Chloride, Serum 106  Creatinine, Serum 0.63  eGFR if  --  eGFR if Non African American --  Glucose, Serum 98  Potassium, Serum 4.3  Protein Total, Serum --  Sodium, Serum 138                      10-24-22 @ 12:52  Diamond Aminotransferase(ALT/SGPT)11  Albumin, Serum 2.9  Alkaline Phosphatase, Serum 107  Anion Gap, Serum 9  Aspartate Aminotransferase (AST/SGOT)22  Bilirubin Total, Serum 0.5  Blood Urea Nitrogen, Serum 8  Calcium,Total Serum 8.7  Carbon Dioxide, Serum 26  Chloride, Serum 103  Creatinine, Serum 0.63  eGFR if  --  eGFR if Non African American --  Glucose, Serum 161  Potassium, Serum 4.2  Protein Total, Serum 6.5  Sodium, Serum 138                          PT/INR      Amylase/Lipase            RADIOLOGY & ADDITIONAL TESTS:    Imaging Personally Reviewed:  [ ] YES  [ ] NO JUANIS DE SANTIAGO  81y  Male      Patient is a 81y old  Male who presents with a chief complaint of Postoperative wound bleed (25 Oct 2022 11:07)      Medical Co-management  Requested by:    Plastics     Team      HPI:   " 80 yo M w/ HTN, HLD, HFrEF (EF 30-35%), VT, s/p ACID, AS s/p TAVR, CAD s/p stents, GERD, hypothyroid, anxiety, pancreatic ca s/p whipple 10/26/21, chemo and xrt with non-healing abdominal wound treated w/ HBOT and STSG 7/8/22, s/p repeat STSG wound vac placement on 9/29 resulting in poor take at discharge, p/w bleeding from abdominal incision site. pt endorses bleeding 1d prior, resolved today. denies abd pain. pt also endorses bleeding from L thigh graft site, although appears hemostatic in the ED. denies fevers, chills, n/v, diarrhea, dysuria."    Pt confirms came in from rehab for bleeding from abdominal site and pain. Denies bleeding from L thigh graft site. Pt states feels "so-so", endorsing diffuse abdominal pain. No CP, SOB, n/v, focal weakness, LE edema. States urinating ok. Pt unsure why he is quetiapine and states on xanax " for pain". Pt falls asleep numerous time during interview requires prompting to stay awake. Aaox3.    States saw o/p cardiologist Dr. Mercer 3weeks ago and told was doing well.                PAST MEDICAL/SURGICAL HISTORY  PAST MEDICAL & SURGICAL HISTORY:  HTN (hypertension)      HLD (hyperlipidemia)      GERD (gastroesophageal reflux disease)      Cardiomyopathy      MSSA bacteremia  9/2021      Acute osteomyelitis      Pulmonary embolism      Pancreas cancer  chemo, s/p Whipple      [place] (hard of hearing)  B/L hearing aids      Other complications of procedures, not elsewhere classified, subsequent encounter      History of total hip replacement  left X 1, 2 revisions      History of laminectomy  X3      ICD (implantable cardiac defibrillator) in place  implanted 2005, replaced 10/4/2021 [employer] Subcutaneous ICD      Benign testicular tumor  radiation      Status post amputation of toe of right foot  8/2021      Status post fracture of femur  2017 left with hardware      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      H/O Whipple procedure  2/2021, 10/2021            T(C): 36.4 (10-25-22 @ 09:31), Max: 37 (10-24-22 @ 16:57)  HR: 66 (10-25-22 @ 09:31) (60 - 80)  BP: 93/42 (10-25-22 @ 09:31) (93/42 - 129/62)  RR: 18 (10-25-22 @ 09:31) (17 - 20)  SpO2: 94% (10-25-22 @ 09:31) (94% - 100%)  Wt(kg): --Vital Signs Last 24 Hrs  T(C): 36.4 (25 Oct 2022 09:31), Max: 37 (24 Oct 2022 16:57)  T(F): 97.5 (25 Oct 2022 09:31), Max: 98.6 (24 Oct 2022 16:57)  HR: 66 (25 Oct 2022 09:31) (60 - 80)  BP: 93/42 (25 Oct 2022 09:31) (93/42 - 129/62)  BP(mean): --  RR: 18 (25 Oct 2022 09:31) (17 - 20)  SpO2: 94% (25 Oct 2022 09:31) (94% - 100%)    Parameters below as of 25 Oct 2022 09:31  Patient On (Oxygen Delivery Method): room air        PHYSICAL EXAM:  GENERAL: NAD, well-groomed, well-developed, +drowsy, but aaox3, requires frequent prompting to stay awake  NECK: Supple, No JVD  CHEST/LUNG: CTAB; No rales, rhonchi, wheezing, or rubs, breathing comfortably  HEART: Regular rate and rhythm; +systolic murmur  ABDOMEN: Soft, Nontender, Nondistended; + large midline wound with wound vac, area of surrounding erythema, mild tenderness to palpation diffuse  EXTREMITIES:  No clubbing, cyanosis, or edema  LYMPH: No lymphadenopathy noted  NERVOUS SYSTEM:  Alert & Oriented X3, Good concentration;   SKIN: No rashes or lesions + L thigh graft site, with mild erythema - no bleeding    Consultant(s) Notes Reviewed:  [x ] YES  [ ] NO  Care Discussed with Consultants/Other Providers [ x] YES  [ ] NO    LABS:  CBC   10-24-22 @ 12:52  Hematcorit 27.8  Hemoglobin 8.8  Mean Cell Hemoglobin 28.9  Platelet Count-Automated 169  RBC Count 3.04  Red Cell Distrib Width 21.5  Wbc Count 3.13      BMP  10-25-22 @ 05:52  Anion Gap. Serum 12  Blood Urea Nitrogen,Serm 10  Calcium, Total Serum 8.3  Carbon Dioxide, Serum 20  Chloride, Serum 106  Creatinine, Serum 0.63  eGFR in  --  eGFR in Non Afican American --  Gloucose, serum 98  Potassium, Serum 4.3  Sodium, Serum 138              10-24-22 @ 12:52  Anion Gap. Serum 9  Blood Urea Nitrogen,Serm 8  Calcium, Total Serum 8.7  Carbon Dioxide, Serum 26  Chloride, Serum 103  Creatinine, Serum 0.63  eGFR in  --  eGFR in Non Afican American --  Gloucose, serum 161  Potassium, Serum 4.2  Sodium, Serum 138                  CMP  10-25-22 @ 05:52  Diamond Aminotransferase(ALT/SGPT)--  Albumin, Serum --  Alkaline Phosphatase, Serum --  Anion Gap, Serum 12  Aspartate Aminotransferase (AST/SGOT)--  Bilirubin Total, Serum --  Blood Urea Nitrogen, Serum 10  Calcium,Total Serum 8.3  Carbon Dioxide, Serum 20  Chloride, Serum 106  Creatinine, Serum 0.63  eGFR if  --  eGFR if Non African American --  Glucose, Serum 98  Potassium, Serum 4.3  Protein Total, Serum --  Sodium, Serum 138                      10-24-22 @ 12:52  Diamond Aminotransferase(ALT/SGPT)11  Albumin, Serum 2.9  Alkaline Phosphatase, Serum 107  Anion Gap, Serum 9  Aspartate Aminotransferase (AST/SGOT)22  Bilirubin Total, Serum 0.5  Blood Urea Nitrogen, Serum 8  Calcium,Total Serum 8.7  Carbon Dioxide, Serum 26  Chloride, Serum 103  Creatinine, Serum 0.63  eGFR if  --  eGFR if Non African American --  Glucose, Serum 161  Potassium, Serum 4.2  Protein Total, Serum 6.5  Sodium, Serum 138                          PT/INR      Amylase/Lipase            RADIOLOGY & ADDITIONAL TESTS:    Imaging Personally Reviewed:  [ ] YES  [ ] NO

## 2022-10-25 NOTE — DISCHARGE NOTE PROVIDER - EXTENDED VTE YES NO FOR MLM ASPIRIN
[Takes medication as prescribed] : takes [None] : Patient does not have any barriers to medication adherence ,

## 2022-10-25 NOTE — DISCHARGE NOTE PROVIDER - DETAILS OF MALNUTRITION DIAGNOSIS/DIAGNOSES
This patient has been assessed with a concern for Malnutrition and was treated during this hospitalization for the following Nutrition diagnosis/diagnoses:     -  11/11/2022: Severe protein-calorie malnutrition   -  10/31/2022: Moderate protein-calorie malnutrition

## 2022-10-25 NOTE — DISCHARGE NOTE PROVIDER - HOSPITAL COURSE
10 yo M w/ HTN, HLD, HFrEF (EF 30-35%), VT, s/p ACID, AS s/p TAVR, CAD s/p stents, GERD, hypothyroid, anxiety, pancreatic ca s/p whipple 10/26/21, chemo and xrt with non-healing abdominal wound treated w/ HBOT and STSG 7/8/22, s/p repeat STSG wound vac placement on 9/29 resulting in poor take at discharge, p/w bleeding from abdominal incision site. pt endorses bleeding 1d prior.     He was admitted for observation of bleeding wound site. 12 yo M w/ HTN, HLD, HFrEF (EF 30-35%), VT, s/p ACID, AS s/p TAVR, CAD s/p stents, GERD, hypothyroid, anxiety, pancreatic ca s/p whipple 10/26/21, chemo and xrt with non-healing abdominal wound treated w/ HBOT and STSG 7/8/22, s/p repeat STSG wound vac placement on 9/29 resulting in poor take at discharge, p/w bleeding from abdominal incision site. pt endorses bleeding 1d prior. He was admitted for observation of bleeding wound site.   HD01-02: There was no bleeding appreciated on the abdominal wound. Adaptic and a black sponge wound vac was placed on the abdominal wound with minimal output. Medicine was consulted for management of comorbidities. Med onc was consulted for management of history of pancreatic cancer and screening for recurrence.   HD03: During vac change, abdominal wound began bleeding from left superior skin edge. Surgicel was applied and hemostasis was achieved. Abdominal pads were placed over the wound. CT abdomen and chest revealed new main portal vein thrombus extending to the R and L portal veins and splenic confluence with new evidence of hepatic hypoperfusion; soft tissue changes with possibility of local recurrent neoplastic disease, new onset ascites and pulmonary effusions. Tumor markers were ordered.   HD04: Vac was reapplied with acell, adaptic, and black sponge.    80 yo M w/ HTN, HLD, HFrEF (EF 30-35%), VT, s/p ACID, AS s/p TAVR, CAD s/p stents, GERD, hypothyroid, anxiety, pancreatic ca s/p whipple 10/26/21, chemo and xrt with non-healing abdominal wound treated w/ HBOT and STSG 7/8/22, s/p repeat STSG wound vac placement on 9/29, p/w bleeding from abdominal incision site now s/p new wound vac this admission now s/p removal. Course c/b EUS s/p bx of lesion, path + adenocarcinoma concern for recurrence of pancreatic ca     Pancreatic cancer.   - s/p whipple, chemo and RT 10/21  - CT a/p this admission c/w PVT and liver lesion   - S/p EUS 11/10 w/ biopsies -path +adenocarcinoma c/w recurrence of disease  [ ] obtain CT chest for staging-------------------------------   - case discussed with medical oncology- offered palliative chemo outpt after rehab. also discussed with surgical oncology Dr. Mercer- rec outpt f/u.     Problem/Plan - 2:  ·  Problem: Hypotension.   ·  Plan: SBP 's throughout hospitalization, asymptomatic. Has been lower 11/9 onwards 2/2 NPO status and diarrhea, improves with IVF. Patient asymptomatic throughout admission, lactate 2.1, no other s/s of end organ dysfunction   - Febrile and hypotensive overnight 11/10 post-EUS- likely 2/2 gut translocation post procedurally   -  BCx NGTD. CXR w/o consolidation   - s/p empiric zosyn. will monitor off abx for now  - Encourage PO  - holding all BP meds metoprolol, entresto; re-start as tolerated.     Problem/Plan - 3:  ·  Problem: Portal vein thrombosis.   ·  Plan: CT A/P: New filling defect within the main portal vein and extending into the right and left portal veins, concerning for near occlusive to occlusive portal venous thrombosis. The portal splenic confluence and the central splenic vein are not well visualized and may be thrombosed.   - Started on Xarelto and then switched to Heparin post-procedure   [ ] Switched to Lovenox, can switch back to Xarelto once H/H stable.     Problem/Plan - 4:  ·  Problem: Open abdominal wall wound.   ·  Plan: Required wound vac, now removed on 11/10, managed by Plastic surgery   - Cont dressing   - Plastics following.     Problem/Plan - 5:  ·  Problem: Diarrhea.   ·  Plan: 2d of diarrhea, now improved. GI PCR positive for ETEC though patient has been hospitalized for 2.5 weeks- suspect false positive   - Self resolved  - Hold on Azithromycin.     Problem/Plan - 6:  ·  Problem: Anemia.   ·  Plan: Baseline seems to be around 8-9. slowly down trending. possibly d/t slow blood loss from abd wound  - Iron studies c/w RYAN- consider IV iron once infectious etiology ruled out   -Received 3U PRBC on this admission, H/H slowly down-trending though no sig bleeding noted, no blood in stool   -transfuse if hgb<7.     Problem/Plan - 7:  ·  Problem: Chronic systolic congestive heart failure.   ·  Plan: Not in exacerbation, appears generally euvolemic,   -Lasix  toprol, entresto on hold d.t hypotension   - Outpatient cardiology follow up     #Hx VT: C/w amiodarone, s/p AICD.     Problem/Plan - 8:  ·  Problem: Hypothyroid.   ·  Plan: C/w synthroid 25mcg qd.     Problem/Plan - 9:  ·  Problem: History of BPH.   ·  Plan: c/w tamsulosin     82 yo M w/ HTN, HLD, HFrEF (EF 30-35%), VT, s/p ACID, AS s/p TAVR, CAD s/p stents, GERD, hypothyroid, anxiety, pancreatic ca s/p whipple 10/26/21, chemo and xrt with non-healing abdominal wound treated w/ HBOT and STSG 7/8/22, s/p repeat STSG wound vac placement on 9/29, p/w bleeding from abdominal incision site now s/p new wound vac this admission now s/p removal. Course c/b EUS s/p bx of lesion, path + adenocarcinoma concern for recurrence of pancreatic ca     Pancreatic cancer.   - s/p whipple, chemo and RT 10/21  - CT a/p this admission c/w PVT and liver lesion   - S/p EUS 11/10 w/ biopsies -path +adenocarcinoma c/w recurrence of disease  [ ]  CT chest for staging-------------------------------   - case discussed with medical oncology- offered palliative chemo outpt after rehab. also discussed with surgical oncology Dr. Mercer- rec outpt f/u.    Hypotension.   -SBP 's throughout hospitalization, asymptomatic. Has been lower 11/9 onwards 2/2 NPO status and diarrhea, improves with IVF. Patient asymptomatic throughout admission, lactate 2.1, no other s/s of end organ dysfunction   - Febrile and hypotensive overnight 11/10 post-EUS- likely 2/2 gut translocation post procedurally   -  BCx NGTD. CXR w/o consolidation   - s/p empiric zosyn. will monitor off abx for now  - holding all BP meds metoprolol, entresto; re-start as tolerated.    Portal vein thrombosis.   -CT A/P: New filling defect within the main portal vein and extending into the right and left portal veins, concerning for near occlusive to occlusive portal venous thrombosis. The portal splenic confluence and the central splenic vein are not well visualized and may be thrombosed.   - heparin inpatient-->Xarelto on d/c     Open abdominal wall wound.   -Required wound vac, now removed on 11/10, managed by Plastic surgery   - Cont dressing changes     Diarrhea.   - 2d of diarrhea, now improved. GI PCR positive for ETEC though patient has been hospitalized for 2.5 weeks- suspect false positive   - Self resolved  - Hold on Azithromycin.    Anemia.   - Baseline seems to be around 8-9. slowly down trending. possibly d/t slow blood loss from abd wound  - Iron studies c/w RYAN- consider IV iron once infectious etiology ruled out   -Received 3U PRBC on this admission, H/H slowly down-trending though no sig bleeding noted, no blood in stool   -transfuse if hgb<7.     Chronic systolic congestive heart failure.   -Not in exacerbation, appears generally euvolemic,   -Lasix  toproljosesto on hold d.t hypotension   - Outpatient cardiology follow up          82 yo M w/ HTN, HLD, HFrEF (EF 30-35%), VT, s/p ACID, AS s/p TAVR, CAD s/p stents, GERD, hypothyroid, anxiety, pancreatic ca s/p whipple 10/26/21, chemo and xrt with non-healing abdominal wound treated w/ HBOT and STSG 7/8/22, s/p repeat STSG wound vac placement on 9/29, p/w bleeding from abdominal incision site now s/p new wound vac this admission now s/p removal. Course c/b EUS s/p bx of lesion, path + adenocarcinoma concern for recurrence of pancreatic ca     Pancreatic cancer.   - s/p whipple, chemo and RT 10/21  - CT a/p this admission c/w PVT and liver lesion   - S/p EUS 11/10 w/ biopsies -path +adenocarcinoma c/w recurrence of disease  [ ]  CT chest for staging-------------------------------   - case discussed with medical oncology- offered palliative chemo outpt after rehab. also discussed with surgical oncology Dr. Mercer- rec outpt f/u.    Hypotension.   -SBP 's throughout hospitalization, asymptomatic. Has been lower 11/9 onwards 2/2 NPO status and diarrhea, improves with IVF. Patient asymptomatic throughout admission, lactate 2.1, no other s/s of end organ dysfunction   - Febrile and hypotensive overnight 11/10 post-EUS- likely 2/2 gut translocation post procedurally   -  BCx NGTD. CXR w/o consolidation   - s/p empiric zosyn. will monitor off abx for now  - holding all BP meds metoprolol, entresto; re-start as tolerated.    Portal vein thrombosis.   -CT A/P: New filling defect within the main portal vein and extending into the right and left portal veins, concerning for near occlusive to occlusive portal venous thrombosis. The portal splenic confluence and the central splenic vein are not well visualized and may be thrombosed.   - heparin inpatient-->Xarelto on d/c     Open abdominal wall wound.   -Required wound vac, now removed on 11/10, managed by Plastic surgery   - Cont dressing changes     Diarrhea.   - 2d of diarrhea, now improved. GI PCR positive for ETEC though patient has been hospitalized for 2.5 weeks- suspect false positive   - Self resolved  - Hold on Azithromycin.    Anemia.   - Baseline seems to be around 8-9. slowly down trending. possibly d/t slow blood loss from abd wound  - Iron studies c/w RYAN- consider IV iron once infectious etiology ruled out   -Received 3U PRBC on this admission, H/H slowly down-trending though no sig bleeding noted, no blood in stool   -transfuse if hgb<7.     Chronic systolic congestive heart failure.   -Not in exacerbation, appears generally euvolemic,   -Lasix  toprol, entresto on hold d.t hypotension   - Outpatient cardiology follow up    Patient  is medically cleared for discharge to rehab as discussed with _____ on _____          80 yo M w/ HTN, HLD, HFrEF (EF 30-35%), VT, s/p ACID, AS s/p TAVR, CAD s/p stents, GERD, hypothyroid, anxiety, pancreatic ca s/p whipple 10/26/21, chemo and xrt with non-healing abdominal wound treated w/ HBOT and STSG 7/8/22, s/p repeat STSG wound vac placement on 9/29, p/w bleeding from abdominal incision site now s/p new wound vac this admission now s/p removal. Course c/b EUS s/p bx of lesion, path + adenocarcinoma concern for recurrence of pancreatic ca     Pancreatic cancer.   - s/p whipple, chemo and RT 10/21  - CT a/p this admission c/w PVT and liver lesion   - S/p EUS 11/10 w/ biopsies -path +adenocarcinoma c/w recurrence of disease  - CT chest for staging---CT chest showed 4 mm right apical nodule is increased in size since 6/16/2022 when it measured 2 mmfollow-up .CT chest is recommended in 3 months   - case discussed with medical oncology- offered palliative chemo outpt after rehab. also discussed with surgical oncology Dr. Mercer- rec outpt f/u.    Hypotension.   -SBP 's throughout hospitalization, asymptomatic. Has been lower 11/9 onwards 2/2 NPO status and diarrhea, improves with IVF. Patient asymptomatic throughout admission, lactate 2.1, no other s/s of end organ dysfunction   - Febrile and hypotensive overnight 11/10 post-EUS- likely 2/2 gut translocation post procedurally   -  BCx NGTD. CXR w/o consolidation   - s/p empiric zosyn. will monitor off abx for now  - holding all BP meds metoprolol, entresto; re-start as tolerated.    Portal vein thrombosis.   -CT A/P: New filling defect within the main portal vein and extending into the right and left portal veins, concerning for near occlusive to occlusive portal venous thrombosis. The portal splenic confluence and the central splenic vein are not well visualized and may be thrombosed.   - heparin inpatient-->Xarelto on d/c     Open abdominal wall wound.   -Required wound vac, now removed on 11/10, managed by Plastic surgery   - Cont dressing changes     Diarrhea.   - 2d of diarrhea, now improved. GI PCR positive for ETEC though patient has been hospitalized for 2.5 weeks- suspect false positive   - Self resolved  - Hold on Azithromycin.    Anemia.   - Baseline seems to be around 8-9. slowly down trending. possibly d/t slow blood loss from abd wound  - Iron studies c/w RYAN- consider IV iron once infectious etiology ruled out   -Received 3U PRBC on this admission, H/H slowly down-trending though no sig bleeding noted, no blood in stool   -transfuse if hgb<7.     Chronic systolic congestive heart failure.   -Not in exacerbation, appears generally euvolemic,   -Lasix  toprol, entresto on hold d.t hypotension   - Outpatient cardiology follow up    Patient  is medically cleared for discharge to rehab as discussed with _____ on _____          80 yo M w/ HTN, HLD, HFrEF (EF 30-35%), VT, s/p ACID, AS s/p TAVR, CAD s/p stents, GERD, hypothyroid, anxiety, pancreatic ca s/p whipple 10/26/21, chemo and xrt with non-healing abdominal wound treated w/ HBOT and STSG 7/8/22, s/p repeat STSG wound vac placement on 9/29, p/w bleeding from abdominal incision site now s/p new wound vac this admission now s/p removal. Course c/b EUS s/p bx of lesion, path + adenocarcinoma concern for recurrence of pancreatic ca     Pancreatic cancer.   - s/p whipple, chemo and RT 10/21  - CT a/p this admission c/w PVT and liver lesion   - S/p EUS 11/10 w/ biopsies -path +adenocarcinoma c/w recurrence of disease  - CT chest for staging---CT chest showed 4 mm right apical nodule is increased in size since 6/16/2022 when it measured 2 mmfollow-up .CT chest is recommended in 3 months   - case discussed with medical oncology- offered palliative chemo outpt after rehab. also discussed with surgical oncology Dr. Mercer- rec outpt f/u.    Hypotension.   -SBP 's throughout hospitalization, asymptomatic. Has been lower 11/9 onwards 2/2 NPO status and diarrhea, improves with IVF. Patient asymptomatic throughout admission, lactate 2.1, no other s/s of end organ dysfunction   - Febrile and hypotensive overnight 11/10 post-EUS- likely 2/2 gut translocation post procedurally   -  BCx NGTD. CXR w/o consolidation   - s/p empiric zosyn. will monitor off abx for now  - holding all BP meds metoprolol, entresto; re-start as tolerated.    Portal vein thrombosis.   -CT A/P: New filling defect within the main portal vein and extending into the right and left portal veins, concerning for near occlusive to occlusive portal venous thrombosis. The portal splenic confluence and the central splenic vein are not well visualized and may be thrombosed.   - heparin inpatient-->Xarelto on d/c     Open abdominal wall wound.   -Required wound vac, now removed on 11/10, managed by Plastic surgery   - Cont dressing changes     Diarrhea.   - 2d of diarrhea, now improved. GI PCR positive for ETEC though patient has been hospitalized for 2.5 weeks- suspect false positive   - Self resolved  - Hold on Azithromycin.    Anemia.   - Baseline seems to be around 8-9. slowly down trending. possibly d/t slow blood loss from abd wound  - Iron studies c/w RYAN- consider IV iron once infectious etiology ruled out   -Received 3U PRBC on this admission, H/H slowly down-trending though no sig bleeding noted, no blood in stool   -transfuse if hgb<7.     Chronic systolic congestive heart failure.   -Not in exacerbation, appears generally euvolemic,   -Lasix  toprol, entresto on hold d.t hypotension   - Outpatient cardiology follow up    Patient  is medically cleared for discharge to rehab as discussed with  on 11/18/22

## 2022-10-25 NOTE — PROGRESS NOTE ADULT - ASSESSMENT
A: This is a 80 yo M PMH HTN, HLD, HFrEF s/p ACID, AS s/p TAVR, CAD s/p stents, pancreatic ca s/p chemo and radiation therapy s/p whipple on 10/26/21 with resulting non-healing abdominal wound treated with repeat STSG and vac (7/8/22, 9/29/22) both with poor take, now treated with local wound care. Currently presents to the ED for bleeding of the abdominal wound. There is no appreciable bleeding from the abdominal wound or STSG donor site. H/H appears stable since prior to latest admission.    Plan  - Admitted to PRS, Dr. Gallegos  - Monitor wound vac output.   - Patient likely to be discharged today vs tomorrow pending wound vac output.     Felisatyrone Medley MD PGY1  Plastic Surgery   Available on Teams. A: This is a 82 yo M PMH HTN, HLD, HFrEF s/p ACID, AS s/p TAVR, CAD s/p stents, pancreatic ca s/p chemo and radiation therapy s/p whipple on 10/26/21 with resulting non-healing abdominal wound treated with repeat STSG and vac (7/8/22, 9/29/22) both with poor take, now treated with local wound care. Currently presents to the ED for bleeding of the abdominal wound. There is no appreciable bleeding from the abdominal wound or STSG donor site. H/H appears stable since prior to latest admission.    Plan  - Admitted to PRS, Dr. Gallegos  - Monitor wound vac output.   - Consult medicine for medical co-management.    Felisa Medley MD PGY1  Plastic Surgery   Available on Teams.

## 2022-10-25 NOTE — CHART NOTE - NSCHARTNOTEFT_GEN_A_CORE
I STOP    Search Terms: danika delgado, 1940Search Date: 10/25/2022 15:45:02 PM  Searching on behalf of: [facility]  The Drug Utilization Report below displays all of the controlled substance prescriptions, if any, that your patient has filled in the last twelve months. The information displayed on this report is compiled from pharmacy submissions to the Department, and accurately reflects the information as submitted by the pharmacies.    This report was requested by: Alma Rosa Murray | Reference #: [id number]    Others' Prescriptions  Patient Name: Malcolm Cosby Date: 1940  Address: [address] [place], [place] [address]Tng: Male  Rx Written	Rx Dispensed	Drug	Quantity	Days Supply	Prescriber Name	Prescriber Cris #	Payment Method	Dispenser  08/31/2022	09/01/2022	alprazolam 0.25 mg tablet	60	30	Mia Bartlett NP	[id number]	[facility]   07/15/2022	07/15/2022	oxycodone hcl (ir) 5 mg tablet	8	2	Evelin Pro (PA)	[id number]	Insurance	[facility]   07/08/2022	07/08/2022	oxycodone hcl (ir) 5 mg tablet	20	5	Mikhail Gallegos	[id number]	[facility]   04/04/2022	04/04/2022	alprazolam 0.25 mg tablet	60	30	Shanta Jackson	[id number]	[facility]     Patient Name: Malcolm Cosby Date: 1940  Address: [address] [place], [place] [address]Ufs: Male  Rx Written	Rx Dispensed	Drug	Quantity	Days Supply	Prescriber Name	Prescriber Cris #	Payment Method	Dispenser  10/11/2022	10/11/2022	oxycodone hcl (ir) 5 mg tablet	30	30	Ivonne Mckenzie NP	[id number]	[facility]  10/07/2022	10/07/2022	oxycodone hcl (ir) 5 mg tablet	30	14	Ariel Brown [facility]	[id number]	[place]  10/07/2022	10/07/2022	alprazolam 0.25 mg tablet	14	14	Ariel Brown Dnp	[id number]	[facility]    Patient Name: Malcolm MorrowoBirth Date: 1940  Address: [address] [place], [place] [address]Skb: Male  Rx Written	Rx Dispensed	Drug	Quantity	Days Supply	Prescriber Name	Prescriber Cris #	Payment Method	Dispenser  01/25/2022	01/27/2022	alprazolam 0.25 mg tablet	60	30	Manuel Shanta	[id number]	[facility]   01/18/2022	01/24/2022	alprazolam 0.25 mg tablet	14	7	Aden Juan A (MD PHD)	[id number]	[facility]   01/06/2022	01/06/2022	alprazolam 0.25 mg tablet	14	7	Joann Day	[id number]	[facility]    Patient Name: Malcolm BellalinoBirth Date: 1940  Address: [address] [place] [address] [address] [place], [place] [address]Txj: Male  Rx Written	Rx Dispensed	Drug	Quantity	Days Supply	Prescriber Name	Prescriber Cris #	Payment Method	Dispenser  12/05/2021	12/05/2021	alprazolam 0.25 mg tablet	14	14	Gage Rodríguez	[id number]	[place]	[facility]  11/30/2021	11/30/2021	alprazolam 0.25 mg tablet	7	7	Kyra Brock	[id number]	[place]	[facility]  11/26/2021	11/26/2021	oxycodone hcl (ir) 5 mg tablet	36	3	Claire Griffith	[id number]	[place]	[facility]  11/19/2021	11/19/2021	oxycodone hcl (ir) 5 mg tablet	40	4	Kyra Brock	[id number]	[place]	[facility]  11/18/2021	11/18/2021	alprazolam 0.25 mg tablet	7	7	Kyra Brock	[id number]	[place]	[facility]  11/15/2021	11/15/2021	alprazolam 0.25 mg tablet	7	7	Claire Griffith	[id number]	[place]	[facility]  11/15/2021	11/15/2021	oxycodone hcl (ir) 5 mg tablet	12	2	PoppyAmyOdalisolenaClaire goldberg	[id number]	[place]	[facility]  11/15/2021	11/15/2021	oxycodone hcl (ir) 5 mg tablet	12	1	PoppyAmyOdalistracyClaire	[id number]	[place]	[facility]  * - Drugs marked with an asterisk are compound drugs. If the compound drug is made up of more than one controlled substance, then each controlled substance will be a separate row in the table.

## 2022-10-25 NOTE — DISCHARGE NOTE PROVIDER - NSDCFUADDAPPT_GEN_ALL_CORE_FT
Please follow up with surgical oncologist Dr. Mercer upon discharge  Please follow up with surgical oncologist Dr. Mercer upon discharge     Please follow up with the plastic surgeon Dr. Gallegos within 1-2 weeks of discharge

## 2022-10-25 NOTE — DISCHARGE NOTE PROVIDER - NSDCFUSCHEDAPPT_GEN_ALL_CORE_FT
Mikhail Gallegos  [facility]  [address]ASTICSUR [address]  Scheduled Appointment: 10/31/2022    Sourav Gu  [facility]  CARDIOLOGY [address] [facility] [address]  Scheduled Appointment: 11/01/2022    [facility]  ELECTROPH [address]  Scheduled Appointment: 11/11/2022    [facility]  CARDIOLOGY [address] [facility]  Scheduled Appointment: 11/16/2022     Aden Juan [facility]  [facility]  Scheduled Appointment: 11/22/2022    [facility]den [facility]  [facility]  Scheduled Appointment: 12/06/2022     [facility]  [facility]edi  Scheduled Appointment: 11/22/2022

## 2022-10-25 NOTE — PATIENT PROFILE ADULT - DO YOU FEEL THREATENED BY OTHERS?
Ideal Body Weight: 130 lbs; % Ideal [phone] no

## 2022-10-25 NOTE — CONSULT NOTE ADULT - ASSESSMENT
80 yo M w/ HTN, HLD, HFrEF (EF 30-35%), VT, s/p ACID, AS s/p TAVR, CAD s/p stents, GERD, hypothyroid, anxiety, pancreatic ca s/p whipple 10/26/21, chemo and xrt with non-healing abdominal wound treated w/ HBOT and STSG 7/8/22, s/p repeat STSG wound vac placement on 9/29 resulting in poor take at discharge, p/w bleeding from abdominal incision site now s/p new wound vac this admission. Medicine consulted for co-management    NOTE INCOMPLETE UNTIL ATTESTED    #Oversedation  -c/f oversedation from medications - quetiapine oxycodone,gabapentin and xanax  -unclear why on quetiapine and xanax - pt stated he did not know why he is on these medications, though old notes noting ?anxiety   -Would advise clarification and consider discontinuing if able   quetiapine -also is qtc prolonging    # open wound of abdominal wall, s/p multiple grafts, and s/p wound vac  - reports pain medications helping to manage abdominal and skin graft pain  - pain management, wound care per surgery team  - follow bms and consider bowel regimen to prevent opioid induced constipation    #Med recc  -Primary team to check that outpatient med list is accurate    # HFrEF (EF 30-35%), VT, s/p AICD, AS, s/p TAVR, CAD s/p stents  - Pt appears euvolemic to dry, denies chest pain, clinically euvolemic on exam, appearing comfortable  - c/w amiodarone for  hx VT  - c/w lasix, toprol, entresto for chronic HFrEF  -consider baseline EKG    #Hx of HTN - currently borderline low BP  -110s/50s  -monitor, c/w hold parameters    # Pancreatic cancer  - c/w creon, otpt f/u    # Hypothyroid  - c/w synthroid    # BPH  - c/w flomax  -reports voiding    Need for prophylactic measure  VTE ppx: lovenox as per PRS/Sx    Dispo: d/c planning to [facility]       82 yo M w/ HTN, HLD, HFrEF (EF 30-35%), VT, s/p ACID, AS s/p TAVR, CAD s/p stents, GERD, hypothyroid, anxiety, pancreatic ca s/p whipple 10/26/21, chemo and xrt with non-healing abdominal wound treated w/ HBOT and STSG 7/8/22, s/p repeat STSG wound vac placement on 9/29 resulting in poor take at discharge, p/w bleeding from abdominal incision site now s/p new wound vac this admission. Medicine consulted for co-management    NOTE INCOMPLETE UNTIL ATTESTED    #Oversedation  -c/f oversedation from medications - quetiapine oxycodone,gabapentin and xanax  -unclear why on quetiapine and xanax - pt stated he did not know why he is on these medications, though old notes noting ?anxiety   -Would advise clarification and consider discontinuing if able   quetiapine -also is qtc prolonging  -Istop performed  [ ] Recc DC quietiapine for oversedation  [ ] Recc change xanax to PRN     # open wound of abdominal wall, s/p multiple grafts, and s/p wound vac  - reports pain medications helping to manage abdominal and skin graft pain  - pain management, wound care per surgery team  - follow bms and consider bowel regimen to prevent opioid induced constipation    #Hypotension i/s/o HFrEF (EF 30-35%), VT, s/p AICD, AS, s/p TAVR, CAD s/p stents  - Pt appears euvolemic to dry, denies chest pain, clinically euvolemic on exam, appearing comfortable  - BP 90s-110s/50s  - c/w amiodarone for  hx VT  - c/w lasix, toprol, entresto for chronic HFrEF for now  -monitor I&Os  consider orthostatics  -[ ] consider cardiology eval to see if lasix and entresto could be held as they can lead to hypotension  -[ ] baseline EKG  [ ] Echo      # Pancreatic cancer  - c/w creon, otpt f/u    # Hypothyroid  - c/w synthroid    # BPH  - c/w flomax  -reports voiding    Need for prophylactic measure  VTE ppx: lovenox as per PRS/Sx    Dispo: d/c planning to [facility]    Primary team confirmed o/p med list is accurate     80 yo M w/ HTN, HLD, HFrEF (EF 30-35%), VT, s/p ACID, AS s/p TAVR, CAD s/p stents, GERD, hypothyroid, anxiety, pancreatic ca s/p whipple 10/26/21, chemo and xrt with non-healing abdominal wound treated w/ HBOT and STSG 7/8/22, s/p repeat STSG wound vac placement on 9/29 resulting in poor take at discharge, p/w bleeding from abdominal incision site now s/p new wound vac this admission. Medicine consulted for co-management    NOTE INCOMPLETE UNTIL ATTESTED    #Oversedation  -c/f oversedation from medications - quetiapine oxycodone,gabapentin and xanax  -unclear why on quetiapine and xanax - pt stated he did not know why he is on these medications, though old notes noting ?anxiety   -Would advise clarification and consider discontinuing if able   quetiapine -also is qtc prolonging  -Istop performed  [ ] Recc DC quietiapine for oversedation - and no clear indication  Pt wife reports on xanax +gabapentin for sleep, can continue xanax as PRN    # open wound of abdominal wall, s/p multiple grafts, and s/p wound vac  - reports pain medications helping to manage abdominal and skin graft pain  - pain management, wound care per surgery team  - follow bms and consider bowel regimen to prevent opioid induced constipation    #Mild Hypotension i/s/o HFrEF (EF 30-35%), VT, s/p AICD, AS, s/p TAVR, CAD s/p stents  - Pt appears euvolemic to dry, denies chest pain, clinically euvolemic on exam, appearing comfortable  - BP 90s-110s/50s  - c/w amiodarone for  hx VT  - c/w lasix, toprol, entresto for chronic HFrEF for now    # Pancreatic cancer  - c/w creon, otpt f/u    # Hypothyroid  - c/w synthroid    # BPH  - c/w flomax  -reports voiding    Need for prophylactic measure  VTE ppx: lovenox as per PRS/Sx      Primary team confirmed o/p med list is accurate  Per surgery team - plans to DC tmr

## 2022-10-25 NOTE — DISCHARGE NOTE PROVIDER - CARE PROVIDER_API CALL
Mikhail Gallegos)  Plastic Surgery  [address] [facility], [address] [place] [id number]  Phone: [phone]  Fax: [phone]  Follow Up Time:

## 2022-10-26 NOTE — PROGRESS NOTE ADULT - ASSESSMENT
80 yo M w/ HTN, HLD, HFrEF (EF 30-35%), VT, s/p ACID, AS s/p TAVR, CAD s/p stents, GERD, hypothyroid, anxiety, pancreatic ca s/p whipple 10/26/21, chemo and xrt with non-healing abdominal wound treated w/ HBOT and STSG 7/8/22, s/p repeat STSG wound vac placement on 9/29, p/w bleeding from abdominal incision site now s/p new wound vac this admission.

## 2022-10-26 NOTE — PROGRESS NOTE ADULT - SUBJECTIVE AND OBJECTIVE BOX
Patient is a 81y old  Male who presents with a chief complaint of Postoperative wound bleed (26 Oct 2022 14:24)    SUBJECTIVE / OVERNIGHT EVENTS: No acute events. Feeling well at time of encounter. No chest pain, shortness of breath, fever, chills, dizziness, vomiting.     MEDICATIONS  (STANDING):  acetaminophen     Tablet .. 650 milliGRAM(s) Oral every 6 hours  ALPRAZolam 0.25 milliGRAM(s) Oral at bedtime  aMIOdarone    Tablet 200 milliGRAM(s) Oral daily  DULoxetine 60 milliGRAM(s) Oral daily  furosemide    Tablet 20 milliGRAM(s) Oral daily  gabapentin 100 milliGRAM(s) Oral three times a day  heparin   Injectable 5000 Unit(s) SubCutaneous every 12 hours  influenza  Vaccine (HIGH DOSE) 0.7 milliLiter(s) IntraMuscular once  levothyroxine 25 MICROGram(s) Oral daily  metoprolol succinate ER 50 milliGRAM(s) Oral daily  pancrelipase  (CREON 24,000 Lipase Units) 3 Capsule(s) Oral three times a day with meals  pantoprazole    Tablet 40 milliGRAM(s) Oral before breakfast  sacubitril 49 mG/valsartan 51 mG 1 Tablet(s) Oral two times a day  tamsulosin 0.4 milliGRAM(s) Oral at bedtime    MEDICATIONS  (PRN):  artificial tears (preservative free) Ophthalmic Solution 1 Drop(s) Both EYES every 2 hours PRN Dry Eyes  diphenhydrAMINE 25 milliGRAM(s) Oral every 6 hours PRN Rash and/or Itching  melatonin 3 milliGRAM(s) Oral at bedtime PRN Insomnia  ondansetron Injectable 4 milliGRAM(s) IV Push once PRN Nausea and/or Vomiting  oxyCODONE    IR 5 milliGRAM(s) Oral every 4 hours PRN Moderate Pain (4 - 6)      CAPILLARY BLOOD GLUCOSE      POCT Blood Glucose.: 167 mg/dL (25 Oct 2022 16:24)    I&O's Summary    25 Oct 2022 07:01  -  26 Oct 2022 07:00  --------------------------------------------------------  IN: 0 mL / OUT: 425 mL / NET: -425 mL    PHYSICAL EXAM:  Vital Signs Last 24 Hrs  T(C): 36.2 (26 Oct 2022 13:35), Max: 36.6 (25 Oct 2022 17:35)  T(F): 97.2 (26 Oct 2022 13:35), Max: 97.8 (25 Oct 2022 17:35)  HR: 82 (26 Oct 2022 13:35) (62 - 82)  BP: 123/58 (26 Oct 2022 13:35) (105/60 - 127/55)  BP(mean): --  RR: 18 (26 Oct 2022 13:35) (18 - 18)  SpO2: 97% (26 Oct 2022 13:35) (97% - 100%)    Parameters below as of 26 Oct 2022 13:35  Patient On (Oxygen Delivery Method): room air    CONSTITUTIONAL: NAD, well-developed, well-groomed  EYES: conjunctiva and sclera clear  ENMT: Moist oral mucosa  NECK: no JVD  RESPIRATORY: Normal respiratory effort; lungs are clear to auscultation bilaterally  CARDIOVASCULAR: Regular rate and rhythm, normal S1 and S2, No lower extremity edema; Peripheral pulses are 2+ bilaterally  ABDOMEN: Soft NT ND  PSYCH: calm, affect appropriate  NEUROLOGY: moving all extremities    LABS:    10-25    138  |  106  |  10  ----------------------------<  98  4.3   |  20<L>  |  0.63    Ca    8.3<L>      25 Oct 2022 05:52  Phos  3.8     10-25  Mg     1.60     10-25    RADIOLOGY & ADDITIONAL TESTS: Reviewed    COORDINATION OF CARE:  Care Discussed with Consultants/Other Providers [Y- Plastic surgery]

## 2022-10-26 NOTE — PROGRESS NOTE ADULT - ASSESSMENT
Assessment & Plan  This is a 82 yo M PMH HTN, HLD, HFrEF s/p ACID, AS s/p TAVR, CAD s/p stents, pancreatic ca s/p chemo and radiation therapy s/p whipple on 10/26/21 with resulting non-healing abdominal wound treated with repeat STSG and vac (7/8/22, 9/29/22) both with poor take, now treated with local wound care. Currently presents to the ED for bleeding of the abdominal wound. There is no appreciable bleeding from the abdominal wound or STSG donor site. H/H appears stable since prior to latest admission.    Plan  - Admitted to [facility], Dr. Gallegos  - Monitor wound vac output.   - Appreciate IM recs  - CT chest/abdomen   - PT eval  - Med onc consulted, appreciate recs  - plan for vac change today with acell  - Dispo planning        Geeta Jones  Plastic & Reconstructive Surgery, PGY-1  #[id number]

## 2022-10-26 NOTE — CONSULT NOTE ADULT - ASSESSMENT
81m with h/p pancreatic cancer s/p neoadjuvant gem/abraxane x 5 cycles completed in 9/21, s/p whipple operation 10/26/21. Pathology revealed pancreatic adenocarcinoma, 0/31LN, positive pancreatic resection margin requiring adjuvant RT (SBRT 4000 cGy in 5 fxs) completed in 1/19/22. Post op course c/b non healing abdominal wound, presenting for wound bleeding.     CT a/p has been ordered, no official results as of yet, will follow.   Check     -Supportive care, pain control, Nutrition, PT, DVT ppx  -Outpatient oncology f/u    Will follow. Please do not hesitate to call back with questions.     Raquel Vallecillo MD  Medical Oncology Attending  C: [phone]     time spent on direct patient care, interdisciplinary discussions and chart review. 81m with h/p pancreatic cancer s/p neoadjuvant gem/abraxane x 5 cycles completed in 9/21, s/p whipple operation 10/26/21. Pathology revealed pancreatic adenocarcinoma, 0/31LN, positive pancreatic resection margin requiring adjuvant RT (SBRT 4000 cGy in 5 fxs) completed in 1/19/22. Post op course c/b non healing abdominal wound, presenting for wound bleeding.     CT a/p has been ordered, no official results as of yet, will follow.   Check   Supportive care, pain control, Nutrition, PT, DVT ppx  Outpatient oncology f/u    Will follow. Please do not hesitate to call back with questions.     Raquel Vallecillo MD  Medical Oncology Attending  C: [phone]     time spent on direct patient care, interdisciplinary discussions and chart review.

## 2022-10-26 NOTE — CONSULT NOTE ADULT - SUBJECTIVE AND OBJECTIVE BOX
Patient is a 81y old  Male who presents with a chief complaint of Postoperative wound bleed (26 Oct 2022 07:54)        HPI:   80 yo M w/ HTN, HLD, HFrEF (EF 30-35%), VT, s/p ACID, AS s/p TAVR, CAD s/p stents, GERD, hypothyroid, anxiety, pancreatic ca s/p whipple 10/26/21, chemo and xrt with non-healing abdominal wound treated w/ HBOT and STSG 7/8/22, s/p repeat STSG wound vac placement on 9/29 resulting in poor take at discharge, p/w bleeding from abdominal incision site. pt endorses bleeding 1d prior, resolved today. denies abd pain. pt also endorses bleeding from L thigh graft site, although appears hemostatic in the ED. denies fevers, chills, n/v, diarrhea, dysuria.      PAST MEDICAL & SURGICAL HISTORY:  HTN (hypertension)      HLD (hyperlipidemia)      GERD (gastroesophageal reflux disease)      Cardiomyopathy      MSSA bacteremia  9/2021      Acute osteomyelitis      Pulmonary embolism      Pancreas cancer  chemo, s/p Whipple      [place] (hard of hearing)  B/L hearing aids      Other complications of procedures, not elsewhere classified, subsequent encounter      History of total hip replacement  left X 1, 2 revisions      History of laminectomy  X3      ICD (implantable cardiac defibrillator) in place  implanted 2005, replaced 10/4/2021 [employer] Subcutaneous ICD      Benign testicular tumor  radiation      Status post amputation of toe of right foot  8/2021      Status post fracture of femur  2017 left with hardware      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      H/O Whipple procedure  2/2021, 10/2021        Allergies    No Known Allergies    Intolerances      Home Medications:  amiodarone 200 mg oral tablet: 1 tab(s) orally once a day AM (29 Sep 2022 14:20)  Creon 24,000 units oral delayed release capsule: 3 cap(s) orally 3 times a day (29 Sep 2022 14:20)  DULoxetine 60 mg oral delayed release capsule: 1 cap(s) orally once a day (at bedtime) (29 Sep 2022 14:20)  Entresto 49 mg-51 mg oral tablet: 1 tab(s) orally 2 times a day (29 Sep 2022 14:20)  gabapentin 100 mg oral tablet: 1 tab(s) orally 3 times a day, As Needed (29 Sep 2022 14:20)  Lasix 20 mg oral tablet: 1 tab(s) orally once a day AM (29 Sep 2022 14:20)  levothyroxine 25 mcg (0.025 mg) oral tablet: 1 tab(s) orally once a day AM (29 Sep 2022 14:20)  Metoprolol Succinate ER 50 mg oral tablet, extended release: 1 tab(s) orally once a day AM (29 Sep 2022 14:20)  pantoprazole 40 mg oral delayed release tablet: 1 tab(s) orally once a day AM (29 Sep 2022 14:20)  petrolatum topical ointment: 1 application topically once a day (07 Oct 2022 10:36)  QUEtiapine 25 mg oral tablet: 1 tab(s) orally once a day (at bedtime) (29 Sep 2022 14:20)  tamsulosin 0.4 mg oral capsule: 1 cap(s) orally once a day (at bedtime) (29 Sep 2022 14:20)  Xanax 0.25 mg oral tablet: 1 tab(s) orally once a day, As Needed (29 Sep 2022 14:20)    MEDICATIONS  (STANDING):      SOCIAL HISTORY:  FAMILY HISTORY:  Family history of stroke (Mother)        ___________________________________________  OBJECTIVE:  Vital Signs Last 24 Hrs  T(C): 36.8 (24 Oct 2022 11:10), Max: 36.8 (24 Oct 2022 11:10)  T(F): 98.3 (24 Oct 2022 11:10), Max: 98.3 (24 Oct 2022 11:10)  HR: 65 (24 Oct 2022 11:10) (65 - 65)  BP: 122/59 (24 Oct 2022 11:10) (122/59 - 122/59)  BP(mean): --  RR: 16 (24 Oct 2022 11:10) (16 - 16)  SpO2: 100% (24 Oct 2022 11:10) (100% - 100%)    Parameters below as of 24 Oct 2022 11:10  Patient On (Oxygen Delivery Method): room air           (24 Oct 2022 16:39)       Oncologic History:      ROS: as above     PAST MEDICAL & SURGICAL HISTORY:  HTN (hypertension)      HLD (hyperlipidemia)      GERD (gastroesophageal reflux disease)      Cardiomyopathy      MSSA bacteremia  9/2021      Acute osteomyelitis      Pulmonary embolism      Pancreas cancer  chemo, s/p Whipple      [place] (hard of hearing)  B/L hearing aids      Other complications of procedures, not elsewhere classified, subsequent encounter      History of total hip replacement  left X 1, 2 revisions      History of laminectomy  X3      ICD (implantable cardiac defibrillator) in place  implanted 2005, replaced 10/4/2021 [employer] Subcutaneous ICD      Benign testicular tumor  radiation      Status post amputation of toe of right foot  8/2021      Status post fracture of femur  2017 left with hardware      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      H/O Whipple procedure  2/2021, 10/2021          SOCIAL HISTORY:    FAMILY HISTORY:  Family history of stroke (Mother)        MEDICATIONS  (STANDING):  acetaminophen     Tablet .. 650 milliGRAM(s) Oral every 6 hours  ALPRAZolam 0.25 milliGRAM(s) Oral at bedtime  aMIOdarone    Tablet 200 milliGRAM(s) Oral daily  DULoxetine 60 milliGRAM(s) Oral daily  furosemide    Tablet 20 milliGRAM(s) Oral daily  gabapentin 100 milliGRAM(s) Oral three times a day  heparin   Injectable 5000 Unit(s) SubCutaneous every 12 hours  influenza  Vaccine (HIGH DOSE) 0.7 milliLiter(s) IntraMuscular once  levothyroxine 25 MICROGram(s) Oral daily  metoprolol succinate ER 50 milliGRAM(s) Oral daily  pancrelipase  (CREON 24,000 Lipase Units) 3 Capsule(s) Oral three times a day with meals  pantoprazole    Tablet 40 milliGRAM(s) Oral before breakfast  sacubitril 49 mG/valsartan 51 mG 1 Tablet(s) Oral two times a day  tamsulosin 0.4 milliGRAM(s) Oral at bedtime    MEDICATIONS  (PRN):  artificial tears (preservative free) Ophthalmic Solution 1 Drop(s) Both EYES every 2 hours PRN Dry Eyes  diphenhydrAMINE 25 milliGRAM(s) Oral every 6 hours PRN Rash and/or Itching  melatonin 3 milliGRAM(s) Oral at bedtime PRN Insomnia  ondansetron Injectable 4 milliGRAM(s) IV Push once PRN Nausea and/or Vomiting  oxyCODONE    IR 5 milliGRAM(s) Oral every 4 hours PRN Moderate Pain (4 - 6)      Allergies    No Known Allergies    Intolerances        Vital Signs Last 24 Hrs  T(C): 36.6 (26 Oct 2022 09:29), Max: 36.6 (25 Oct 2022 17:35)  T(F): 97.8 (26 Oct 2022 09:29), Max: 97.8 (25 Oct 2022 17:35)  HR: 75 (26 Oct 2022 09:29) (62 - 80)  BP: 113/54 (26 Oct 2022 09:29) (105/60 - 127/55)  BP(mean): --  RR: 18 (26 Oct 2022 09:29) (18 - 18)  SpO2: 97% (26 Oct 2022 09:29) (97% - 100%)    Parameters below as of 26 Oct 2022 09:29  Patient On (Oxygen Delivery Method): room air        PHYSICAL EXAM  General: adult in NAD  HEENT: clear oropharynx, anicteric sclera, pink conjunctiva  Neck: supple  CV: normal S1/S2 with no murmur rubs or gallops  Lungs: positive air movement b/l ant lungs, clear to auscultation, no wheezes, no rales  Abdomen: soft non-tender non-distended, no hepatosplenomegaly  Ext: no clubbing cyanosis or edema  Skin: no rashes and no petechiae  Neuro: alert and oriented X 3, none focal    LABS:            Serial CBC's  10-24 @ 12:52  Hct-27.8 / Hgb-8.8 / Plat-169 / RBC-3.04 / WBC-3.13      10-25    138  |  106  |  10  ----------------------------<  98  4.3   |  20<L>  |  0.63    Ca    8.3<L>      25 Oct 2022 05:52  Phos  3.8     10-25  Mg     1.60     10-25                        RADIOLOGY & ADDITIONAL STUDIES:

## 2022-10-26 NOTE — PHYSICAL THERAPY INITIAL EVALUATION ADULT - ADDITIONAL COMMENTS
Pt was admitted from rehab center. Pt lives alone in an apartment with 0 stairs to enter. Pt endorsed ambulating independently with a rolling walker and denied any falls within the last year.     Pt. left comfortable on stretcher and NAD. Pt stated he was admitted from rehab center. Pt lives alone in an apartment with 0 stairs to enter. Pt endorsed ambulating independently with a rolling walker and denied any falls within the last year.     Pt. left comfortable on stretcher and NAD.

## 2022-10-26 NOTE — PROGRESS NOTE ADULT - SUBJECTIVE AND OBJECTIVE BOX
Plastic Surgery Progress Note (pg [id number]: [id number], [facility]: [phone])    SUBJECTIVE  The patient was seen and examined. No acute events overnight. Patient was resting comfortably in bed. No concerns or complaints.    OBJECTIVE  ___________________________________________________  VITAL SIGNS / I&O's   Vital Signs Last 24 Hrs  T(C): 36.3 (26 Oct 2022 06:30), Max: 36.8 (25 Oct 2022 14:00)  T(F): 97.4 (26 Oct 2022 06:30), Max: 98.2 (25 Oct 2022 14:00)  HR: 69 (26 Oct 2022 06:30) (62 - 80)  BP: 127/55 (26 Oct 2022 06:30) (93/42 - 127/55)  BP(mean): --  RR: 18 (26 Oct 2022 06:30) (18 - 18)  SpO2: 98% (26 Oct 2022 06:30) (94% - 100%)    Parameters below as of 26 Oct 2022 06:30  Patient On (Oxygen Delivery Method): room air          25 Oct 2022 07:01  -  26 Oct 2022 07:00  --------------------------------------------------------  IN:  Total IN: 0 mL    OUT:    VAC (Vacuum Assisted Closure) System (mL): 25 mL    Voided (mL): 400 mL  Total OUT: 425 mL    Total NET: -425 mL        ___________________________________________________  PHYSICAL EXAM    -- CONSTITUTIONAL: NAD, lying in bed  -- NEURO: Awake, alert  -- PULM: Non-labored respirations  -- ABDOMEN: vac in place over abdominal wound, holding suction with minimal sanguinous output     ___________________________________________________  LABS                        8.8    3.13  )-----------( 169      ( 24 Oct 2022 12:52 )             27.8     25 Oct 2022 05:52    138    |  106    |  10     ----------------------------<  98     4.3     |  20     |  0.63     Ca    8.3        25 Oct 2022 05:52  Phos  3.8       25 Oct 2022 05:52  Mg     1.60      25 Oct 2022 05:52    TPro  6.5    /  Alb  2.9    /  TBili  0.5    /  DBili  x      /  AST  22     /  ALT  11     /  AlkPhos  107    24 Oct 2022 12:52      CAPILLARY BLOOD GLUCOSE      POCT Blood Glucose.: 167 mg/dL (25 Oct 2022 16:24)          ___________________________________________________  MICRO  Recent Cultures:    ___________________________________________________  MEDICATIONS  (STANDING):  acetaminophen     Tablet .. 650 milliGRAM(s) Oral every 6 hours  ALPRAZolam 0.25 milliGRAM(s) Oral at bedtime  aMIOdarone    Tablet 200 milliGRAM(s) Oral daily  DULoxetine 60 milliGRAM(s) Oral daily  furosemide    Tablet 20 milliGRAM(s) Oral daily  gabapentin 100 milliGRAM(s) Oral three times a day  heparin   Injectable 5000 Unit(s) SubCutaneous every 12 hours  influenza  Vaccine (HIGH DOSE) 0.7 milliLiter(s) IntraMuscular once  levothyroxine 25 MICROGram(s) Oral daily  metoprolol succinate ER 50 milliGRAM(s) Oral daily  pancrelipase  (CREON 24,000 Lipase Units) 3 Capsule(s) Oral three times a day with meals  pantoprazole    Tablet 40 milliGRAM(s) Oral before breakfast  sacubitril 49 mG/valsartan 51 mG 1 Tablet(s) Oral two times a day  tamsulosin 0.4 milliGRAM(s) Oral at bedtime    MEDICATIONS  (PRN):  artificial tears (preservative free) Ophthalmic Solution 1 Drop(s) Both EYES every 2 hours PRN Dry Eyes  diphenhydrAMINE 25 milliGRAM(s) Oral every 6 hours PRN Rash and/or Itching  melatonin 3 milliGRAM(s) Oral at bedtime PRN Insomnia  ondansetron Injectable 4 milliGRAM(s) IV Push once PRN Nausea and/or Vomiting  oxyCODONE    IR 5 milliGRAM(s) Oral every 4 hours PRN Moderate Pain (4 - 6)

## 2022-10-26 NOTE — PHYSICAL THERAPY INITIAL EVALUATION ADULT - PERTINENT HX OF CURRENT PROBLEM, REHAB EVAL
Pt is a 81 year old male who presented to [facility] with post-operative wound bleed. s/p whipple on 10/26/21 with resulting non-healing abdominal wound treated with repeat STSG and vac (7/8/22, 9/29/22) both with poor take, now treated with local wound care.

## 2022-10-27 NOTE — PROGRESS NOTE ADULT - SUBJECTIVE AND OBJECTIVE BOX
Patient is a 81y old  Male who presents with a chief complaint of Postoperative wound bleed (27 Oct 2022 15:54)      INTERVAL HPI/OVERNIGHT EVENTS:  Seen by me this afternoon, son at bedside. Sitting in chair, feeling a bit better. Decreased appetite. Pain overall under control. No nausea today.    Review of Systems: 12 point review of systems otherwise negative    MEDICATIONS  (STANDING):  acetaminophen     Tablet .. 650 milliGRAM(s) Oral every 6 hours  ALPRAZolam 0.25 milliGRAM(s) Oral at bedtime  aMIOdarone    Tablet 200 milliGRAM(s) Oral daily  DULoxetine 60 milliGRAM(s) Oral daily  furosemide    Tablet 20 milliGRAM(s) Oral daily  gabapentin 100 milliGRAM(s) Oral three times a day  heparin   Injectable 5000 Unit(s) SubCutaneous every 12 hours  influenza  Vaccine (HIGH DOSE) 0.7 milliLiter(s) IntraMuscular once  levothyroxine 25 MICROGram(s) Oral daily  metoprolol succinate ER 50 milliGRAM(s) Oral daily  pancrelipase  (CREON 24,000 Lipase Units) 3 Capsule(s) Oral three times a day with meals  pantoprazole    Tablet 40 milliGRAM(s) Oral before breakfast  sacubitril 49 mG/valsartan 51 mG 1 Tablet(s) Oral two times a day  tamsulosin 0.4 milliGRAM(s) Oral at bedtime    MEDICATIONS  (PRN):  artificial tears (preservative free) Ophthalmic Solution 1 Drop(s) Both EYES every 2 hours PRN Dry Eyes  diphenhydrAMINE 25 milliGRAM(s) Oral every 6 hours PRN Rash and/or Itching  melatonin 3 milliGRAM(s) Oral at bedtime PRN Insomnia  ondansetron Injectable 4 milliGRAM(s) IV Push every 6 hours PRN Nausea and/or Vomiting  oxyCODONE    IR 5 milliGRAM(s) Oral every 4 hours PRN Moderate Pain (4 - 6)      Allergies    No Known Allergies    Intolerances          Vital Signs Last 24 Hrs  T(C): 36.4 (27 Oct 2022 22:26), Max: 36.8 (27 Oct 2022 09:13)  T(F): 97.6 (27 Oct 2022 22:26), Max: 98.3 (27 Oct 2022 14:36)  HR: 80 (27 Oct 2022 22:26) (80 - 100)  BP: 118/55 (27 Oct 2022 22:26) (101/53 - 123/80)  BP(mean): --  RR: 18 (27 Oct 2022 22:26) (18 - 18)  SpO2: 100% (27 Oct 2022 22:26) (98% - 100%)    Parameters below as of 27 Oct 2022 22:26  Patient On (Oxygen Delivery Method): room air      CAPILLARY BLOOD GLUCOSE          10-26 @ 07:01  -  10-27 @ 07:00  --------------------------------------------------------  IN: 0 mL / OUT: 550 mL / NET: -550 mL        Physical Exam:    Daily     Daily   General:  Well appearing, NAD, not cachetic  HEENT:  Nonicteric, PERRLA  CV:  RRR, no murmur, no JVD  Lungs:  CTA B/L, no wheezes, rales, rhonchi  Abdomen:  Soft, non-tender, no distended, positive BS, abd wound with vac in place  Extremities:  2+ pulses, no c/c, no edema  Skin:  Warm and dry, no rashes  :  No hernandez  Neuro:  AAOx3, non-focal, CN II-XII grossly intact  No Restraints    LABS:                        8.3    6.40  )-----------( 176      ( 27 Oct 2022 07:55 )             26.6             Urinalysis Basic - ( 27 Oct 2022 06:40 )    Color: Yellow / Appearance: Clear / S.049 / pH: x  Gluc: x / Ketone: Negative  / Bili: Negative / Urobili: <2 mg/dL   Blood: x / Protein: 30 mg/dL / Nitrite: Negative   Leuk Esterase: Negative / RBC: 6 /HPF / WBC 1 /HPF   Sq Epi: x / Non Sq Epi: 0 /HPF / Bacteria: Negative          RADIOLOGY & ADDITIONAL TESTS:  Reviewed by me

## 2022-10-27 NOTE — PROGRESS NOTE ADULT - SUBJECTIVE AND OBJECTIVE BOX
INTERVAL HPI/OVERNIGHT EVENTS:  Patient seen at bedside.  Patient accompanied by his son  Jerardo Das available via phone  Patient complaining of poor sleep  overnight and increased nausea  Pt children inquiring about recent scan results  and plan of care   All questions and concerns addressed    VITAL SIGNS:  T(F): 98.3 (10-27-22 @ 14:36)  HR: 99 (10-27-22 @ 14:36)  BP: 123/80 (10-27-22 @ 14:36)  RR: 18 (10-27-22 @ 14:36)  SpO2: 98% (10-27-22 @ 14:36)  Wt(kg): --    PHYSICAL EXAM:    Constitutional: NAD, resting in bed comfortably  Eyes: EOMI, sclera non-icteric  Neck: supple, no LAD  Respiratory: CTA b/l, good air entry b/l, no wheezing, rhonchi or crackles  Cardiovascular: RRR, normal S1S2, no M/R/G  Gastrointestinal: soft, NTND  Extremities: no edema  Neurological: AAOx3, non focal  Skin: Normal temperature    MEDICATIONS  (STANDING):  acetaminophen     Tablet .. 650 milliGRAM(s) Oral every 6 hours  ALPRAZolam 0.25 milliGRAM(s) Oral at bedtime  aMIOdarone    Tablet 200 milliGRAM(s) Oral daily  DULoxetine 60 milliGRAM(s) Oral daily  furosemide    Tablet 20 milliGRAM(s) Oral daily  gabapentin 100 milliGRAM(s) Oral three times a day  heparin   Injectable 5000 Unit(s) SubCutaneous every 12 hours  influenza  Vaccine (HIGH DOSE) 0.7 milliLiter(s) IntraMuscular once  levothyroxine 25 MICROGram(s) Oral daily  metoprolol succinate ER 50 milliGRAM(s) Oral daily  pancrelipase  (CREON 24,000 Lipase Units) 3 Capsule(s) Oral three times a day with meals  pantoprazole    Tablet 40 milliGRAM(s) Oral before breakfast  sacubitril 49 mG/valsartan 51 mG 1 Tablet(s) Oral two times a day  tamsulosin 0.4 milliGRAM(s) Oral at bedtime    MEDICATIONS  (PRN):  artificial tears (preservative free) Ophthalmic Solution 1 Drop(s) Both EYES every 2 hours PRN Dry Eyes  diphenhydrAMINE 25 milliGRAM(s) Oral every 6 hours PRN Rash and/or Itching  melatonin 3 milliGRAM(s) Oral at bedtime PRN Insomnia  ondansetron Injectable 4 milliGRAM(s) IV Push every 6 hours PRN Nausea and/or Vomiting  oxyCODONE    IR 5 milliGRAM(s) Oral every 4 hours PRN Moderate Pain (4 - 6)      Allergies    No Known Allergies    Intolerances        LABS:                        8.3    6.40  )-----------( 176      ( 27 Oct 2022 07:55 )             26.6             Urinalysis Basic - ( 27 Oct 2022 06:40 )    Color: Yellow / Appearance: Clear / S.049 / pH: x  Gluc: x / Ketone: Negative  / Bili: Negative / Urobili: <2 mg/dL   Blood: x / Protein: 30 mg/dL / Nitrite: Negative   Leuk Esterase: Negative / RBC: 6 /HPF / WBC 1 /HPF   Sq Epi: x / Non Sq Epi: 0 /HPF / Bacteria: Negative        RADIOLOGY & ADDITIONAL TESTS:  Studies reviewed.

## 2022-10-27 NOTE — PROGRESS NOTE ADULT - ASSESSMENT
82 yo M w/ HTN, HLD, HFrEF (EF 30-35%), VT, s/p ACID, AS s/p TAVR, CAD s/p stents, GERD, hypothyroid, anxiety, pancreatic ca s/p whipple 10/26/21, chemo and xrt with non-healing abdominal wound treated w/ HBOT and STSG 7/8/22, s/p repeat STSG wound vac placement on 9/29, p/w bleeding from abdominal incision site now s/p new wound vac this admission.

## 2022-10-27 NOTE — PROGRESS NOTE ADULT - ASSESSMENT
This is a 80 yo M PMH HTN, HLD, HFrEF s/p ACID, AS s/p TAVR, CAD s/p stents, pancreatic ca s/p chemo and radiation therapy s/p whipple on 10/26/21 with resulting non-healing abdominal wound treated with repeat STSG and vac (7/8/22, 9/29/22) both with poor take, now treated with local wound care. Currently presents to the ED for bleeding of the abdominal wound. There is no appreciable bleeding from the abdominal wound or STSG donor site. H/H appears stable since prior to latest admission.    Plan  - Admitted to PRS, Dr. Gallegos  - Appreciate IM recs  - CT abdomen pelvis with concerning findings of thrombosed hepatic vein and new ascites-   - PT evaluated yesterday and recommend [facility]  - Med onc consulted, appreciate recs  - Vac removed yesterday. Applied adaptic and ACell. Please keep abdominal binder on at all times   - Dispo planning This is a 80 yo M PMH HTN, HLD, HFrEF s/p ACID, AS s/p TAVR, CAD s/p stents, pancreatic ca s/p chemo and radiation therapy s/p whipple on 10/26/21 with resulting non-healing abdominal wound treated with repeat STSG and vac (7/8/22, 9/29/22) both with poor take, now treated with local wound care. Currently presents to the ED for bleeding of the abdominal wound. There is no appreciable bleeding from the abdominal wound or STSG donor site. H/H appears stable since prior to latest admission.    Plan  - Admitted to PRS, Dr. Gallegos  - Appreciate IM recs and med onc recs  - CT abdomen pelvis with concerning findings of thrombosed hepatic vein and new ascites-   - PT evaluated yesterday and recommend [facility]  - Med onc consulted, appreciate recs  - Vac removed yesterday. Applied adaptic and ACell. Will reapply vac with acell and adaptic today  - Dispo planning

## 2022-10-27 NOTE — PROGRESS NOTE ADULT - SUBJECTIVE AND OBJECTIVE BOX
Plastic Surgery Progress Note (pg [facility]: [id number], [facility]: [phone])    SUBJECTIVE  The patient was seen and examined. No acute events overnight. Afebrile and VSS. Had two episodes of emesis overnight.     OBJECTIVE  ___________________________________________________  VITAL SIGNS / I&O's   Vital Signs Last 24 Hrs  T(C): 36.7 (27 Oct 2022 05:00), Max: 36.8 (26 Oct 2022 18:00)  T(F): 98 (27 Oct 2022 05:00), Max: 98.3 (26 Oct 2022 21:40)  HR: 100 (27 Oct 2022 05:00) (70 - 100)  BP: 101/53 (27 Oct 2022 05:00) (101/53 - 135/64)  BP(mean): --  RR: 18 (27 Oct 2022 05:00) (16 - 18)  SpO2: 100% (27 Oct 2022 05:00) (97% - 100%)    Parameters below as of 27 Oct 2022 05:00  Patient On (Oxygen Delivery Method): room air          25 Oct 2022 07:01  -  26 Oct 2022 07:00  --------------------------------------------------------  IN:  Total IN: 0 mL    OUT:    VAC (Vacuum Assisted Closure) System (mL): 25 mL    Voided (mL): 400 mL  Total OUT: 425 mL    Total NET: -425 mL        ___________________________________________________  PHYSICAL EXAM    -- CONSTITUTIONAL: NAD, lying in bed  -- NEURO: Awake, alert  -- PULM: Non-labored respirations  -- ABDOMEN: abdominal binder open., overlying ABD, underlying healthy granulation tissue without active bleeding/oozing.   ___________________________________________________  LABS            CAPILLARY BLOOD GLUCOSE              ___________________________________________________  MICRO  Recent Cultures:    ___________________________________________________  MEDICATIONS  (STANDING):  acetaminophen     Tablet .. 650 milliGRAM(s) Oral every 6 hours  ALPRAZolam 0.25 milliGRAM(s) Oral at bedtime  aMIOdarone    Tablet 200 milliGRAM(s) Oral daily  DULoxetine 60 milliGRAM(s) Oral daily  furosemide    Tablet 20 milliGRAM(s) Oral daily  gabapentin 100 milliGRAM(s) Oral three times a day  heparin   Injectable 5000 Unit(s) SubCutaneous every 12 hours  influenza  Vaccine (HIGH DOSE) 0.7 milliLiter(s) IntraMuscular once  levothyroxine 25 MICROGram(s) Oral daily  metoprolol succinate ER 50 milliGRAM(s) Oral daily  pancrelipase  (CREON 24,000 Lipase Units) 3 Capsule(s) Oral three times a day with meals  pantoprazole    Tablet 40 milliGRAM(s) Oral before breakfast  sacubitril 49 mG/valsartan 51 mG 1 Tablet(s) Oral two times a day  tamsulosin 0.4 milliGRAM(s) Oral at bedtime    MEDICATIONS  (PRN):  artificial tears (preservative free) Ophthalmic Solution 1 Drop(s) Both EYES every 2 hours PRN Dry Eyes  diphenhydrAMINE 25 milliGRAM(s) Oral every 6 hours PRN Rash and/or Itching  melatonin 3 milliGRAM(s) Oral at bedtime PRN Insomnia  ondansetron Injectable 4 milliGRAM(s) IV Push every 6 hours PRN Nausea and/or Vomiting  oxyCODONE    IR 5 milliGRAM(s) Oral every 4 hours PRN Moderate Pain (4 - 6)

## 2022-10-27 NOTE — PROGRESS NOTE ADULT - ASSESSMENT
81m with h/p pancreatic cancer s/p neoadjuvant gem/abraxane x 5 cycles completed in 9/21, s/p whipple operation 10/26/21. Pathology revealed pancreatic adenocarcinoma, 0/31LN, positive pancreatic resection margin requiring adjuvant RT (SBRT 4000 cGy in 5 fxs) completed in 1/19/22. Post op course c/b non healing abdominal wound, presenting for wound bleeding.     CT a/p 10/26:   New portal venous thrombosis involving the main, right, and left portal   veins. The portal splenic confluence and central splenic vein are not   well visualized and may be thrombosed.  Status post Whipple procedure. Question ill-defined soft tissue encasing   and obliterating the proximal main portal vein and portal splenic   confluence, and encasing the hepatic artery, raising the possibility of   locally recurrent disease.  New wedge-shaped geographic regions of low attenuation throughout the   liver, likely perfusional abnormalities related to new portal venous   thrombosis and possibly developing infarcts, with neoplasm thought to be   less likely.  New small volume ascites. New moderate bilateral pleural effusions.  Small focus of gas within the urinary bladder. Correlate for recent   instrumentation/catheterization.      -CT A/p with concern for progression of disease vs post radiation changes s/p chemo and Sx  -Would consult IR re biopsy of new soft tissue for further evaluation  -Check , ordered and pending results  -Patient with new PVT, as per patient,  has been off  AC (for h/o CAD) for more than 1 month given abdominal wound complications. Would defer to Cardiology and Plastic Sx re resumption of AC  -Patient endorsing outpatient Creon dose, please reconcile meds   -Rest of care as per primary team  -Patient to followup with Dr. Aden Juan ([facility]) upon discharge  -C/w Supportive care, pain control, Nutrition, PT, DVT ppx  -Oncology will continue to follow with you      Case discussed with Dr. Avel GARCÍA  Oncology Physician Assistant  [facility] TOBAR[facility]  Pager [phone] also available on Teams    If before 8am/after 5pm or on weekends please page On-call Oncology Fellow   81m with h/p pancreatic cancer s/p neoadjuvant gem/abraxane x 5 cycles completed in 9/21, s/p whipple operation 10/26/21. Pathology revealed pancreatic adenocarcinoma, 0/31LN, positive pancreatic resection margin requiring adjuvant RT (SBRT 4000 cGy in 5 fxs) completed in 1/19/22. Post op course c/b non healing abdominal wound, presenting for wound bleeding.     CT a/p 10/26:   New portal venous thrombosis involving the main, right, and left portal   veins. The portal splenic confluence and central splenic vein are not   well visualized and may be thrombosed.  Status post Whipple procedure. Question ill-defined soft tissue encasing   and obliterating the proximal main portal vein and portal splenic   confluence, and encasing the hepatic artery, raising the possibility of   locally recurrent disease.  New wedge-shaped geographic regions of low attenuation throughout the   liver, likely perfusional abnormalities related to new portal venous   thrombosis and possibly developing infarcts, with neoplasm thought to be   less likely.  New small volume ascites. New moderate bilateral pleural effusions.  Small focus of gas within the urinary bladder. Correlate for recent   instrumentation/catheterization.    CT results discussed with patient, his son at bedside and his daughter over the phone.     -CT A/p with concern for progression of disease vs post treatment changes s/p chemo, surgery, RT  -Would consult IR re biopsy of new soft tissue for further evaluation  -Check , ordered and pending results  -Patient with new PVT, as per patient,  has been off  AC (for h/o CAD) for more than 1 month given abdominal wound complications. Would resume full dose AC if and when cleared by plastic surgery.   -Patient endorsing outpatient Creon dose, please reconcile meds   -Rest of care as per primary team  -Patient to followup with Dr. Aden Juan ([facility]) upon discharge  -C/w Supportive care, pain control, Nutrition, PT, DVT ppx  -Oncology will continue to follow with you    Case discussed with Dr. Avel GARCÍA  Oncology Physician Assistant  [facility] TOBAR[facility]  Pager [phone] also available on Teams    If before 8am/after 5pm or on weekends please page On-call Oncology Fellow

## 2022-10-28 NOTE — PROGRESS NOTE ADULT - SUBJECTIVE AND OBJECTIVE BOX
Patient is a 81y old  Male who presents with a chief complaint of Postoperative wound bleed (28 Oct 2022 15:50)      INTERVAL HPI/OVERNIGHT EVENTS:  Seen by me this afternoon, sitting in chair, doing a bit better than yesterday. Nausea has improved, pain under control. Appetite is ok. +BM. Feeling cold.    Review of Systems: 12 point review of systems otherwise negative    MEDICATIONS  (STANDING):  acetaminophen     Tablet .. 650 milliGRAM(s) Oral every 6 hours  ALPRAZolam 0.25 milliGRAM(s) Oral at bedtime  aMIOdarone    Tablet 200 milliGRAM(s) Oral daily  DULoxetine 60 milliGRAM(s) Oral daily  furosemide    Tablet 20 milliGRAM(s) Oral daily  gabapentin 100 milliGRAM(s) Oral three times a day  heparin   Injectable 5000 Unit(s) SubCutaneous every 12 hours  influenza  Vaccine (HIGH DOSE) 0.7 milliLiter(s) IntraMuscular once  levothyroxine 25 MICROGram(s) Oral daily  metoprolol succinate ER 50 milliGRAM(s) Oral daily  pancrelipase  (CREON 24,000 Lipase Units) 3 Capsule(s) Oral three times a day with meals  pantoprazole    Tablet 40 milliGRAM(s) Oral before breakfast  sacubitril 49 mG/valsartan 51 mG 1 Tablet(s) Oral two times a day  tamsulosin 0.4 milliGRAM(s) Oral at bedtime    MEDICATIONS  (PRN):  artificial tears (preservative free) Ophthalmic Solution 1 Drop(s) Both EYES every 2 hours PRN Dry Eyes  diphenhydrAMINE 25 milliGRAM(s) Oral every 6 hours PRN Rash and/or Itching  melatonin 3 milliGRAM(s) Oral at bedtime PRN Insomnia  ondansetron Injectable 4 milliGRAM(s) IV Push every 6 hours PRN Nausea and/or Vomiting  oxyCODONE    IR 5 milliGRAM(s) Oral every 4 hours PRN Moderate Pain (4 - 6)      Allergies    No Known Allergies    Intolerances          Vital Signs Last 24 Hrs  T(C): 36.8 (28 Oct 2022 17:52), Max: 36.8 (28 Oct 2022 17:52)  T(F): 98.2 (28 Oct 2022 17:52), Max: 98.2 (28 Oct 2022 17:52)  HR: 79 (28 Oct 2022 17:52) (79 - 87)  BP: 103/57 (28 Oct 2022 17:52) (103/57 - 125/64)  BP(mean): --  RR: 16 (28 Oct 2022 17:52) (16 - 18)  SpO2: 100% (28 Oct 2022 17:52) (98% - 100%)    Parameters below as of 28 Oct 2022 17:52  Patient On (Oxygen Delivery Method): room air      CAPILLARY BLOOD GLUCOSE          10-27 @ 07:01  -  10-28 @ 07:00  --------------------------------------------------------  IN: 0 mL / OUT: 300 mL / NET: -300 mL        Physical Exam:    Daily     Daily   General:  Well appearing, NAD, not cachetic  HEENT:  Nonicteric, PERRLA  CV:  RRR, no murmur, no JVD  Lungs:  CTA B/L, no wheezes, rales, rhonchi  Abdomen:  Soft, non-tender, no distended, positive BS, abd wound with vac in place  Extremities:  2+ pulses, no c/c, no edema  Skin:  Warm and dry, no rashes  :  No hernandez  Neuro:  AAOx3, non-focal, CN II-XII grossly intact  No Restraints    LABS:                        7.2    4.31  )-----------( 145      ( 28 Oct 2022 07:34 )             23.2             Urinalysis Basic - ( 27 Oct 2022 06:40 )    Color: Yellow / Appearance: Clear / S.049 / pH: x  Gluc: x / Ketone: Negative  / Bili: Negative / Urobili: <2 mg/dL   Blood: x / Protein: 30 mg/dL / Nitrite: Negative   Leuk Esterase: Negative / RBC: 6 /HPF / WBC 1 /HPF   Sq Epi: x / Non Sq Epi: 0 /HPF / Bacteria: Negative          RADIOLOGY & ADDITIONAL TESTS:  Reviewed by me

## 2022-10-28 NOTE — CONSULT NOTE ADULT - SUBJECTIVE AND OBJECTIVE BOX
Interventional Radiology Inpatient Consult Note     Patient is a 81y old  Male who presents with a chief complaint of Postoperative wound bleed (28 Oct 2022 06:00)      Vital Signs: Vital Signs Last 24 Hrs  T(C): 36.4 (28 Oct 2022 13:57), Max: 36.7 (28 Oct 2022 10:45)  T(F): 97.6 (28 Oct 2022 13:57), Max: 98.1 (28 Oct 2022 10:45)  HR: 81 (28 Oct 2022 13:57) (80 - 88)  BP: 125/64 (28 Oct 2022 13:57) (116/56 - 125/64)  BP(mean): --  RR: 16 (28 Oct 2022 13:57) (16 - 18)  SpO2: 100% (28 Oct 2022 13:57) (98% - 100%)    Parameters below as of 28 Oct 2022 13:57  Patient On (Oxygen Delivery Method): room air        Past Medical/ Surgical History: PAST MEDICAL & SURGICAL HISTORY:  HTN (hypertension)      HLD (hyperlipidemia)      GERD (gastroesophageal reflux disease)      Cardiomyopathy      MSSA bacteremia  2021      Acute osteomyelitis      Pulmonary embolism      Pancreas cancer  chemo, s/p Whipple      [place] (hard of hearing)  B/L hearing aids      Other complications of procedures, not elsewhere classified, subsequent encounter      History of total hip replacement  left X 1, 2 revisions      History of laminectomy  X3      ICD (implantable cardiac defibrillator) in place  implanted , replaced 10/4/2021 [employer] Subcutaneous ICD      Benign testicular tumor  radiation      Status post amputation of toe of right foot  2021      Status post fracture of femur  2017 left with hardware      S/P TAVR (transcatheter aortic valve replacement)  2021      H/O Whipple procedure  2021, 10/2021          Allergies: Allergies    No Known Allergies    Intolerances        Medications: MEDICATIONS  (STANDING):  acetaminophen     Tablet .. 650 milliGRAM(s) Oral every 6 hours  ALPRAZolam 0.25 milliGRAM(s) Oral at bedtime  aMIOdarone    Tablet 200 milliGRAM(s) Oral daily  DULoxetine 60 milliGRAM(s) Oral daily  furosemide    Tablet 20 milliGRAM(s) Oral daily  gabapentin 100 milliGRAM(s) Oral three times a day  heparin   Injectable 5000 Unit(s) SubCutaneous every 12 hours  influenza  Vaccine (HIGH DOSE) 0.7 milliLiter(s) IntraMuscular once  levothyroxine 25 MICROGram(s) Oral daily  metoprolol succinate ER 50 milliGRAM(s) Oral daily  pancrelipase  (CREON 24,000 Lipase Units) 3 Capsule(s) Oral three times a day with meals  pantoprazole    Tablet 40 milliGRAM(s) Oral before breakfast  sacubitril 49 mG/valsartan 51 mG 1 Tablet(s) Oral two times a day  tamsulosin 0.4 milliGRAM(s) Oral at bedtime    MEDICATIONS  (PRN):  artificial tears (preservative free) Ophthalmic Solution 1 Drop(s) Both EYES every 2 hours PRN Dry Eyes  diphenhydrAMINE 25 milliGRAM(s) Oral every 6 hours PRN Rash and/or Itching  melatonin 3 milliGRAM(s) Oral at bedtime PRN Insomnia  ondansetron Injectable 4 milliGRAM(s) IV Push every 6 hours PRN Nausea and/or Vomiting  oxyCODONE    IR 5 milliGRAM(s) Oral every 4 hours PRN Moderate Pain (4 - 6)      SOCIAL HISTORY:    FAMILY HISTORY:  Family history of stroke (Mother)          LABS:                        7.2    4.31  )-----------( 145      ( 28 Oct 2022 07:34 )             23.2             Urinalysis Basic - ( 27 Oct 2022 06:40 )    Color: Yellow / Appearance: Clear / S.049 / pH: x  Gluc: x / Ketone: Negative  / Bili: Negative / Urobili: <2 mg/dL   Blood: x / Protein: 30 mg/dL / Nitrite: Negative   Leuk Esterase: Negative / RBC: 6 /HPF / WBC 1 /HPF   Sq Epi: x / Non Sq Epi: 0 /HPF / Bacteria: Negative        RADIOLOGY & ADDITIONAL STUDIES:      ASSESSMENT/DISCUSSION:  Patient is a 81y old  Male who presents with a chief complaint of Postoperative wound bleed (28 Oct 2022 06:00). Complicated pt w/hx of pancreatic adeno s/p whipple procedure and chemoradiation. Now w/PVT and ?mass encasement of the portal vein.    Imaging reviewed personally and in conjunction w/IR attending Dr. Casillas. There is no lesion present amenable to percutaneous biopsy. ?encasement of the main portal vein w/PVT noted, but not percutaneously accessible.     IR consulted for soft-tissue biopsy; periportal.    RECOMMENDATIONS:    - No lesion present amenable to percutaneous biopsy  - No current role for IR intervention  - IR to sign off, reconsult PRN  - d/w IR attending Dr. Sonja Leija MD  PGY-3, Interventional Radiology    -Available on [facility] TEAMS for all non-urgent questions  -Emergent issues: [facility]-p.[phone]; [facility]-p.[phone] ([phone])  -Non-emergent consults: Please place a [place] order "Consult-Interventional Radiology" with an appropriate callback number  -Scheduling questions: [facility]: [phone]; [facility]: [phone]  -Clinic/Outpatient booking: [facility]: [phone]; [facility]: [phone]

## 2022-10-28 NOTE — PROGRESS NOTE ADULT - ASSESSMENT
This is a 82 yo M PMH HTN, HLD, HFrEF s/p ACID, AS s/p TAVR, CAD s/p stents, pancreatic ca s/p chemo and radiation therapy s/p whipple on 10/26/21 with resulting non-healing abdominal wound treated with repeat STSG and vac (7/8/22, 9/29/22) both with poor take, now treated with local wound care. Currently presents to the ED for bleeding of the abdominal wound. There is no appreciable bleeding from the abdominal wound or STSG donor site. H/H appears stable since prior to latest admission.    Plan  - Admitted to PRS, Dr. Gallegos  - Med onc recs  - CT abdomen pelvis with concerning findings of thrombosed portal vein and new ascites, New wedge-shaped geographic regions of low attenuation throughout the liver, likely perfusional abnormalities related to new portal venous. Question ill-defined soft tissue encasing and obliterating the proximal main portal vein and portal splenic confluence, and encasing the hepatic artery, raising the possibility of locally recurrent disease.  - Will consult IR re-specimen collection of the soft tissue encasing proximal main portal vein as seen on CT imaging   thrombosis and possibly developing infarcts  -CA-19 ordered-will follow result.  - PT evaluated patient and recommend [facility]  - Med onc consulted, appreciate recs  - Med list reconciled with daughter.   - Vac placed yesterday.   - Dispo planning   This is a 82 yo M PMH HTN, HLD, HFrEF s/p ACID, AS s/p TAVR, CAD s/p stents, pancreatic ca s/p chemo and radiation therapy s/p whipple on 10/26/21 with resulting non-healing abdominal wound treated with repeat STSG and vac (7/8/22, 9/29/22) both with poor take, now treated with local wound care. Currently presents to the ED for bleeding of the abdominal wound. There is no appreciable bleeding from the abdominal wound or STSG donor site. H/H appears stable since prior to latest admission.    Plan  - Admitted to PRS, Dr. Gallegos  - CT abdomen pelvis with concerning findings of thrombosed portal vein and new ascites, New wedge-shaped geographic regions of low attenuation throughout the liver, likely perfusional abnormalities related to new portal venous. Question ill-defined soft tissue encasing and obliterating the proximal main portal vein and portal splenic confluence, and encasing the hepatic artery, raising the possibility of locally recurrent disease.  - Will consult IR re-specimen collection of the soft tissue encasing proximal main portal vein as seen on CT imaging   thrombosis and possibly developing infarcts  -CA-19 ordered-will follow result.  - PT evaluated patient and recommend [facility]  - Med onc consulted, appreciate recs  - Med list reconciled with daughter.   - Vac placed yesterday.   - Dispo planning   This is a 80 yo M PMH HTN, HLD, HFrEF s/p ACID, AS s/p TAVR, CAD s/p stents, pancreatic ca s/p chemo and radiation therapy s/p whipple on 10/26/21 with resulting non-healing abdominal wound treated with repeat STSG and vac (7/8/22, 9/29/22) both with poor take, now treated with local wound care. Currently presents to the ED for bleeding of the abdominal wound. There is no appreciable bleeding from the abdominal wound or STSG donor site. H/H appears stable since prior to latest admission.    Plan  - Admitted to PRS, Dr. Gallegos  - CT abdomen pelvis with concerning findings of thrombosed portal vein and new ascites, New wedge-shaped geographic regions of low attenuation throughout the liver, likely perfusional abnormalities related to new portal venous. Question ill-defined soft tissue encasing and obliterating the proximal main portal vein and portal splenic confluence, and encasing the hepatic artery, raising the possibility of locally recurrent disease.  - Will consult IR re-specimen collection of the soft tissue encasing proximal main portal vein as seen on CT imaging   thrombosis and possibly developing infarcts  -CA-19 ordered-will follow result.  - PT evaluated patient and recommend [facility]  - Med onc consulted, appreciate recs  - Med list reconciled with daughter.   - Vac placed yesterday.   -Will hold off on full dose AC for post IR consult. Appreciate Heme/Onc recs  - Dispo planning

## 2022-10-28 NOTE — PROGRESS NOTE ADULT - SUBJECTIVE AND OBJECTIVE BOX
Plastic Surgery Progress Note (pg [facility]: [id number], [facility]: [phone])    SUBJECTIVE    The patient was seen and examined. No acute events overnight. Afebrile and VSS. Wound vac was placed yesterday. Spoke to daughter Pippa yesterday.     OBJECTIVE  ___________________________________________________  VITAL SIGNS / I&O's   Vital Signs Last 24 Hrs  T(C): 36.4 (27 Oct 2022 22:26), Max: 36.8 (27 Oct 2022 09:13)  T(F): 97.6 (27 Oct 2022 22:26), Max: 98.3 (27 Oct 2022 14:36)  HR: 80 (27 Oct 2022 22:26) (80 - 99)  BP: 118/55 (27 Oct 2022 22:26) (118/55 - 123/80)  BP(mean): --  RR: 18 (27 Oct 2022 22:26) (18 - 18)  SpO2: 100% (27 Oct 2022 22:26) (98% - 100%)    Parameters below as of 27 Oct 2022 22:26  Patient On (Oxygen Delivery Method): room air          26 Oct 2022 07:  -  27 Oct 2022 07:00  --------------------------------------------------------  IN:  Total IN: 0 mL    OUT:    Voided (mL): 550 mL  Total OUT: 550 mL    Total NET: -550 mL      27 Oct 2022 07:01  -  28 Oct 2022 06:00  --------------------------------------------------------  IN:  Total IN: 0 mL    OUT:    Voided (mL): 250 mL  Total OUT: 250 mL    Total NET: -250 mL        ___________________________________________________  PHYSICAL EXAM    -- CONSTITUTIONAL: NAD, lying in bed  -- NEURO: Awake, alert  -- PULM: Non-labored respirations  -- ABDOMEN: abdominal binder open., overlying ABD, underlying healthy granulation tissue without active bleeding/oozing.   ______________________________  LABS                        8.3    6.40  )-----------( 176      ( 27 Oct 2022 07:55 )             26.6             CAPILLARY BLOOD GLUCOSE            Urinalysis Basic - ( 27 Oct 2022 06:40 )    Color: Yellow / Appearance: Clear / S.049 / pH: x  Gluc: x / Ketone: Negative  / Bili: Negative / Urobili: <2 mg/dL   Blood: x / Protein: 30 mg/dL / Nitrite: Negative   Leuk Esterase: Negative / RBC: 6 /HPF / WBC 1 /HPF   Sq Epi: x / Non Sq Epi: 0 /HPF / Bacteria: Negative      ___________________________________________________  MICRO  Recent Cultures:    ___________________________________________________  MEDICATIONS  (STANDING):  acetaminophen     Tablet .. 650 milliGRAM(s) Oral every 6 hours  ALPRAZolam 0.25 milliGRAM(s) Oral at bedtime  aMIOdarone    Tablet 200 milliGRAM(s) Oral daily  DULoxetine 60 milliGRAM(s) Oral daily  furosemide    Tablet 20 milliGRAM(s) Oral daily  gabapentin 100 milliGRAM(s) Oral three times a day  heparin   Injectable 5000 Unit(s) SubCutaneous every 12 hours  influenza  Vaccine (HIGH DOSE) 0.7 milliLiter(s) IntraMuscular once  levothyroxine 25 MICROGram(s) Oral daily  metoprolol succinate ER 50 milliGRAM(s) Oral daily  pancrelipase  (CREON 24,000 Lipase Units) 3 Capsule(s) Oral three times a day with meals  pantoprazole    Tablet 40 milliGRAM(s) Oral before breakfast  sacubitril 49 mG/valsartan 51 mG 1 Tablet(s) Oral two times a day  tamsulosin 0.4 milliGRAM(s) Oral at bedtime    MEDICATIONS  (PRN):  artificial tears (preservative free) Ophthalmic Solution 1 Drop(s) Both EYES every 2 hours PRN Dry Eyes  diphenhydrAMINE 25 milliGRAM(s) Oral every 6 hours PRN Rash and/or Itching  melatonin 3 milliGRAM(s) Oral at bedtime PRN Insomnia  ondansetron Injectable 4 milliGRAM(s) IV Push every 6 hours PRN Nausea and/or Vomiting  oxyCODONE    IR 5 milliGRAM(s) Oral every 4 hours PRN Moderate Pain (4 - 6)

## 2022-10-29 NOTE — CHART NOTE - NSCHARTNOTEFT_GEN_A_CORE
PLASTIC SURGERY CHART NOTE    Patient discussed with oncology team and with Dr. Gallegos. No plans for operative intervention at this time, will start xarelto.   Hemoglobin also 7.0 this am, will transfuse one unit of pRBCs with goal of 8.0, and will re check a CBC in the am. Blood consent obtained.    MASON LOYA

## 2022-10-29 NOTE — PROGRESS NOTE ADULT - SUBJECTIVE AND OBJECTIVE BOX
Hospitalist Progress Note  Mile Carpio MD Pager # [phone]    OVERNIGHT EVENTS: MEME    SUBJECTIVE / INTERVAL HPI: Patient seen and examined at bedside. Pt. has no dizziness/lightheadedness. No chest pain or SOB. He is eating/drinking and having BMs. All other ROS negative.     VITAL SIGNS:  Vital Signs Last 24 Hrs  T(C): 36.6 (29 Oct 2022 10:20), Max: 36.8 (28 Oct 2022 17:52)  T(F): 97.9 (29 Oct 2022 10:20), Max: 98.2 (28 Oct 2022 17:52)  HR: 71 (29 Oct 2022 10:20) (71 - 82)  BP: 111/80 (29 Oct 2022 10:20) (103/57 - 125/64)  BP(mean): --  RR: 15 (29 Oct 2022 10:20) (15 - 17)  SpO2: 98% (29 Oct 2022 10:20) (96% - 100%)    Parameters below as of 29 Oct 2022 10:20  Patient On (Oxygen Delivery Method): room air        PHYSICAL EXAM:    General: WDWN male resting in bed  HEENT: NC/AT; PERRL, anicteric sclera; MMM  Neck: supple  Cardiovascular: +S1/S2; RRR  Respiratory: CTA B/L; no W/R/R  Gastrointestinal: soft, NT/ND; +BSx4 - wound vac in place - midline  Extremities: WWP; no edema, clubbing or cyanosis  Vascular: 2+ radial, DP/PT pulses B/L  Neurological: AAOx3; no focal deficits    MEDICATIONS:  MEDICATIONS  (STANDING):  acetaminophen     Tablet .. 650 milliGRAM(s) Oral every 6 hours  ALPRAZolam 0.25 milliGRAM(s) Oral at bedtime  aMIOdarone    Tablet 200 milliGRAM(s) Oral daily  DULoxetine 60 milliGRAM(s) Oral daily  furosemide    Tablet 20 milliGRAM(s) Oral daily  gabapentin 100 milliGRAM(s) Oral three times a day  heparin   Injectable 5000 Unit(s) SubCutaneous every 12 hours  influenza  Vaccine (HIGH DOSE) 0.7 milliLiter(s) IntraMuscular once  levothyroxine 25 MICROGram(s) Oral daily  metoprolol succinate ER 50 milliGRAM(s) Oral daily  pancrelipase  (CREON 24,000 Lipase Units) 3 Capsule(s) Oral three times a day with meals  pantoprazole    Tablet 40 milliGRAM(s) Oral before breakfast  rivaroxaban 15 milliGRAM(s) Oral two times a day  sacubitril 49 mG/valsartan 51 mG 1 Tablet(s) Oral two times a day  tamsulosin 0.4 milliGRAM(s) Oral at bedtime    MEDICATIONS  (PRN):  artificial tears (preservative free) Ophthalmic Solution 1 Drop(s) Both EYES every 2 hours PRN Dry Eyes  diphenhydrAMINE 25 milliGRAM(s) Oral every 6 hours PRN Rash and/or Itching  melatonin 3 milliGRAM(s) Oral at bedtime PRN Insomnia  ondansetron Injectable 4 milliGRAM(s) IV Push every 6 hours PRN Nausea and/or Vomiting  oxyCODONE    IR 5 milliGRAM(s) Oral every 4 hours PRN Moderate Pain (4 - 6)      ALLERGIES:  Allergies    No Known Allergies    Intolerances        LABS:                        7.0    3.12  )-----------( 149      ( 29 Oct 2022 05:46 )             22.4     10-29    134<L>  |  102  |  12  ----------------------------<  122<H>  3.9   |  23  |  0.61    Ca    8.4      29 Oct 2022 05:46  Phos  3.7     10-29  Mg     1.70     10-29    TPro  5.8<L>  /  Alb  2.7<L>  /  TBili  0.5  /  DBili  x   /  AST  20  /  ALT  13  /  AlkPhos  96  10-29        CAPILLARY BLOOD GLUCOSE          RADIOLOGY & ADDITIONAL TESTS: Reviewed.    ASSESSMENT:    PLAN:

## 2022-10-29 NOTE — PROGRESS NOTE ADULT - SUBJECTIVE AND OBJECTIVE BOX
Plastic Surgery    SUBJECTIVE: Pt seen and examined on rounds with teamDeepa HANSONPEPITOON. Had difficulty sleeping, but no issues with pain.     VITALS  T(C): 36.6 (10-29-22 @ 06:00), Max: 36.8 (10-28-22 @ 17:52)  HR: 80 (10-29-22 @ 06:00) (79 - 83)  BP: 107/46 (10-29-22 @ 06:00) (103/57 - 125/64)  RR: 17 (10-29-22 @ 06:00) (16 - 17)  SpO2: 96% (10-29-22 @ 06:00) (96% - 100%)  CAPILLARY BLOOD GLUCOSE          Is/Os    10-28 @ 07:01  -  10-29 @ 07:00  --------------------------------------------------------  IN:  Total IN: 0 mL    OUT:    VAC (Vacuum Assisted Closure) System (mL): 10 mL    Voided (mL): 450 mL  Total OUT: 460 mL    Total NET: -460 mL      10-29 @ 07:01  -  10-29 @ 07:45  --------------------------------------------------------  IN:  Total IN: 0 mL    OUT:    Voided (mL): 250 mL  Total OUT: 250 mL    Total NET: -250 mL    PHYSICAL EXAM    -- CONSTITUTIONAL: NAD, lying in bed  -- NEURO: Awake, alert  -- PULM: Non-labored respirations  -- ABDOMEN: VAC holding suction adequately.     MEDICATIONS (STANDING): acetaminophen     Tablet .. 650 milliGRAM(s) Oral every 6 hours  ALPRAZolam 0.25 milliGRAM(s) Oral at bedtime  aMIOdarone    Tablet 200 milliGRAM(s) Oral daily  DULoxetine 60 milliGRAM(s) Oral daily  furosemide    Tablet 20 milliGRAM(s) Oral daily  gabapentin 100 milliGRAM(s) Oral three times a day  heparin   Injectable 5000 Unit(s) SubCutaneous every 12 hours  influenza  Vaccine (HIGH DOSE) 0.7 milliLiter(s) IntraMuscular once  levothyroxine 25 MICROGram(s) Oral daily  metoprolol succinate ER 50 milliGRAM(s) Oral daily  pancrelipase  (CREON 24,000 Lipase Units) 3 Capsule(s) Oral three times a day with meals  pantoprazole    Tablet 40 milliGRAM(s) Oral before breakfast  sacubitril 49 mG/valsartan 51 mG 1 Tablet(s) Oral two times a day  tamsulosin 0.4 milliGRAM(s) Oral at bedtime    MEDICATIONS (PRN):diphenhydrAMINE 25 milliGRAM(s) Oral every 6 hours PRN Rash and/or Itching  melatonin 3 milliGRAM(s) Oral at bedtime PRN Insomnia  ondansetron Injectable 4 milliGRAM(s) IV Push every 6 hours PRN Nausea and/or Vomiting  oxyCODONE    IR 5 milliGRAM(s) Oral every 4 hours PRN Moderate Pain (4 - 6)      LABS  CBC (10-28 @ 07:34)                              7.2<L>                         4.31    )----------------(  145<L>     --    % Neutrophils, --    % Lymphocytes, ANC: --                                  23.2<L>    BMP (10-29 @ 05:46)             134<L>  |  102     |  12    		Ca++ --      Ca 8.4                ---------------------------------( 122<H>		Mg 1.70               3.9     |  23      |  0.61  			Ph 3.7       LFTs (10-29 @ 05:46)      TPro 5.8<L> / Alb 2.7<L> / TBili 0.5 / DBili -- / AST 20 / ALT 13 / AlkPhos 96        ABG (10-28 @ 07:34)      /  /  /  /  / %     Lactate:  1.8        IMAGING STUDIES

## 2022-10-29 NOTE — PROVIDER CONTACT NOTE (CRITICAL VALUE NOTIFICATION) - ASSESSMENT
pt asymptomatic. vss. afebrile. no bleeding/hematoma noted. abd wound vac in place. settings as prescribed.

## 2022-10-29 NOTE — PROGRESS NOTE ADULT - ASSESSMENT
This is a 80 yo M PMH HTN, HLD, HFrEF s/p ACID, AS s/p TAVR, CAD s/p stents, pancreatic ca s/p chemo and radiation therapy s/p whipple on 10/26/21 with resulting non-healing abdominal wound treated with repeat STSG and vac (7/8/22, 9/29/22) both with poor take, now treated with local wound care. Currently presents to the ED for bleeding of the abdominal wound. There is no appreciable bleeding from the abdominal wound or STSG donor site. H/H appears stable since prior to latest admission. CT abdomen pelvis with concerning findings of thrombosed portal vein and new ascites, New wedge-shaped geographic regions of low attenuation throughout the liver, likely perfusional abnormalities related to new portal venous. Question ill-defined soft tissue encasing and obliterating the proximal main portal vein and portal splenic confluence, and encasing the hepatic artery, raising the possibility of locally recurrent disease.    Plan  - IR states no amenable lesion for biopsy, will need to start patient therapeutic anticoagulation  -CA-19 ordered-will follow result.  - PT evaluated patient and recommend [facility]  - Appreciate med/onc recs  - Cont VAC   - Dispo planning    Og Gallego PGY2  Plastic Surgery  [phone]# [facility] pager  [phone] [facility] pager  Available on Teams

## 2022-10-29 NOTE — PROVIDER CONTACT NOTE (CRITICAL VALUE NOTIFICATION) - ACTION/TREATMENT ORDERED:
Recommend home with 3-in-1 commode, rolling walker and OT referral to enable patient to safely perform ADL management and functional mobility. no intervention needed at this time. possible therapy to help today 10/29. will continue to monitor.

## 2022-10-30 NOTE — PROGRESS NOTE ADULT - SUBJECTIVE AND OBJECTIVE BOX
Hospitalist Progress Note  Mile Carpio MD Pager # [phone]    OVERNIGHT EVENTS: MEME    SUBJECTIVE / INTERVAL HPI: Patient seen and examined at bedside. Pt. reports feeling well. No complaints. Eating and drinking well.     VITAL SIGNS:  Vital Signs Last 24 Hrs  T(C): 36.8 (30 Oct 2022 10:29), Max: 36.9 (30 Oct 2022 02:45)  T(F): 98.2 (30 Oct 2022 10:29), Max: 98.4 (30 Oct 2022 02:45)  HR: 85 (30 Oct 2022 10:29) (67 - 85)  BP: 127/52 (30 Oct 2022 10:29) (103/55 - 129/70)  BP(mean): --  RR: 18 (30 Oct 2022 10:29) (17 - 18)  SpO2: 98% (30 Oct 2022 10:29) (96% - 100%)    Parameters below as of 30 Oct 2022 10:29  Patient On (Oxygen Delivery Method): room air        PHYSICAL EXAM:    General: WDWN male resting in bed   HEENT: NC/AT; PERRL, anicteric sclera; MMM  Neck: supple  Cardiovascular: +S1/S2; RRR  Respiratory: CTA B/L; no W/R/R  Gastrointestinal: soft, NT/ND; +BSx4. Midline wound vac in place.   Extremities: WWP; no edema, clubbing or cyanosis  Vascular: 2+ radial, DP/PT pulses B/L  Neurological: AAOx3; no focal deficits    MEDICATIONS:  MEDICATIONS  (STANDING):  acetaminophen     Tablet .. 650 milliGRAM(s) Oral every 6 hours  ALPRAZolam 0.25 milliGRAM(s) Oral at bedtime  aMIOdarone    Tablet 200 milliGRAM(s) Oral daily  DULoxetine 60 milliGRAM(s) Oral daily  furosemide    Tablet 20 milliGRAM(s) Oral daily  gabapentin 100 milliGRAM(s) Oral three times a day  influenza  Vaccine (HIGH DOSE) 0.7 milliLiter(s) IntraMuscular once  levothyroxine 25 MICROGram(s) Oral daily  metoprolol succinate ER 50 milliGRAM(s) Oral daily  pancrelipase  (CREON 24,000 Lipase Units) 3 Capsule(s) Oral three times a day with meals  pantoprazole    Tablet 40 milliGRAM(s) Oral before breakfast  rivaroxaban 15 milliGRAM(s) Oral two times a day  sacubitril 49 mG/valsartan 51 mG 1 Tablet(s) Oral two times a day  tamsulosin 0.4 milliGRAM(s) Oral at bedtime    MEDICATIONS  (PRN):  artificial tears (preservative free) Ophthalmic Solution 1 Drop(s) Both EYES every 2 hours PRN Dry Eyes  diphenhydrAMINE 25 milliGRAM(s) Oral every 6 hours PRN Rash and/or Itching  melatonin 3 milliGRAM(s) Oral at bedtime PRN Insomnia  ondansetron Injectable 4 milliGRAM(s) IV Push every 6 hours PRN Nausea and/or Vomiting  oxyCODONE    IR 5 milliGRAM(s) Oral every 4 hours PRN Moderate Pain (4 - 6)      ALLERGIES:  Allergies    No Known Allergies    Intolerances        LABS:                        7.8    2.78  )-----------( 139      ( 30 Oct 2022 06:52 )             23.9     10-29    134<L>  |  102  |  12  ----------------------------<  122<H>  3.9   |  23  |  0.61    Ca    8.4      29 Oct 2022 05:46  Phos  3.7     10-29  Mg     1.70     10-29    TPro  5.8<L>  /  Alb  2.7<L>  /  TBili  0.5  /  DBili  x   /  AST  20  /  ALT  13  /  AlkPhos  96  10-29        CAPILLARY BLOOD GLUCOSE          RADIOLOGY & ADDITIONAL TESTS: Reviewed.    ASSESSMENT:    PLAN:

## 2022-10-30 NOTE — PROGRESS NOTE ADULT - ASSESSMENT
This is a 82 yo M PMH HTN, HLD, HFrEF s/p ACID, AS s/p TAVR, CAD s/p stents, pancreatic ca s/p chemo and radiation therapy s/p whipple on 10/26/21 with resulting non-healing abdominal wound treated with repeat STSG and vac (7/8/22, 9/29/22) both with poor take, now treated with local wound care. Currently presents to the ED for bleeding of the abdominal wound. There is no appreciable bleeding from the abdominal wound or STSG donor site. H/H appears stable since prior to latest admission. CT abdomen pelvis with concerning findings of thrombosed portal vein and new ascites, New wedge-shaped geographic regions of low attenuation throughout the liver, likely perfusional abnormalities related to new portal venous. Question ill-defined soft tissue encasing and obliterating the proximal main portal vein and portal splenic confluence, and encasing the hepatic artery, raising the possibility of locally recurrent disease.    Plan  - IR states no amenable lesion for biopsy, will need to start patient therapeutic anticoagulation  -CA-19 ordered-will follow result.  - PT evaluated patient and recommend TRINA  - Appreciate med/onc recs - on full dose xeralto for portal vein thrombosis  - pending morning CBC due to transfusion 1 uprbc yesterday  - Cont VAC   - Dispo planning    Og Gallego PGY2  Plastic Surgery  [phone]# [facility] pager  [phone] [facility] pager  Available on Teams

## 2022-10-30 NOTE — PROGRESS NOTE ADULT - SUBJECTIVE AND OBJECTIVE BOX
Plastic Surgery    SUBJECTIVE: Pt seen and examined on rounds with team. NAEON.      VITALS  T(C): 36.4 (10-30-22 @ 06:15), Max: 36.9 (10-30-22 @ 02:45)  HR: 73 (10-30-22 @ 06:15) (67 - 82)  BP: 114/49 (10-30-22 @ 06:15) (103/55 - 129/70)  RR: 18 (10-30-22 @ 06:15) (15 - 18)  SpO2: 99% (10-30-22 @ 06:15) (96% - 100%)  CAPILLARY BLOOD GLUCOSE          Is/Os    10-28 @ 07:01  -  10-29 @ 07:00  --------------------------------------------------------  IN:  Total IN: 0 mL    OUT:    VAC (Vacuum Assisted Closure) System (mL): 10 mL    Voided (mL): 450 mL  Total OUT: 460 mL    Total NET: -460 mL      10-29 @ 07:01  -  10-30 @ 06:57  --------------------------------------------------------  IN:  Total IN: 0 mL    OUT:    Voided (mL): 750 mL  Total OUT: 750 mL    Total NET: -750 mL        PHYSICAL EXAM    -- CONSTITUTIONAL: NAD, lying in bed  -- NEURO: Awake, alert  -- PULM: Non-labored respirations  -- ABDOMEN: VAC holding suction adequately.     MEDICATIONS (STANDING): acetaminophen     Tablet .. 650 milliGRAM(s) Oral every 6 hours  ALPRAZolam 0.25 milliGRAM(s) Oral at bedtime  aMIOdarone    Tablet 200 milliGRAM(s) Oral daily  DULoxetine 60 milliGRAM(s) Oral daily  furosemide    Tablet 20 milliGRAM(s) Oral daily  gabapentin 100 milliGRAM(s) Oral three times a day  heparin   Injectable 5000 Unit(s) SubCutaneous every 12 hours  influenza  Vaccine (HIGH DOSE) 0.7 milliLiter(s) IntraMuscular once  levothyroxine 25 MICROGram(s) Oral daily  metoprolol succinate ER 50 milliGRAM(s) Oral daily  pancrelipase  (CREON 24,000 Lipase Units) 3 Capsule(s) Oral three times a day with meals  pantoprazole    Tablet 40 milliGRAM(s) Oral before breakfast  rivaroxaban 15 milliGRAM(s) Oral two times a day  sacubitril 49 mG/valsartan 51 mG 1 Tablet(s) Oral two times a day  tamsulosin 0.4 milliGRAM(s) Oral at bedtime    MEDICATIONS (PRN):diphenhydrAMINE 25 milliGRAM(s) Oral every 6 hours PRN Rash and/or Itching  melatonin 3 milliGRAM(s) Oral at bedtime PRN Insomnia  ondansetron Injectable 4 milliGRAM(s) IV Push every 6 hours PRN Nausea and/or Vomiting  oxyCODONE    IR 5 milliGRAM(s) Oral every 4 hours PRN Moderate Pain (4 - 6)      LABS  CBC (10-29 @ 05:46)                              7.0<LL>                         3.12<L>  )----------------(  149<L>     --    % Neutrophils, --    % Lymphocytes, ANC: --                                  22.4<L>    BMP (10-29 @ 05:46)             134<L>  |  102     |  12    		Ca++ --      Ca 8.4                ---------------------------------( 122<H>		Mg 1.70               3.9     |  23      |  0.61  			Ph 3.7       LFTs (10-29 @ 05:46)      TPro 5.8<L> / Alb 2.7<L> / TBili 0.5 / DBili -- / AST 20 / ALT 13 / AlkPhos 96              IMAGING STUDIES

## 2022-10-31 NOTE — DIETITIAN INITIAL EVALUATION ADULT - PERTINENT LABORATORY DATA
10-29 Na 134 mmol/L<L> Glu 122 mg/dL<H> K+ 3.9 mmol/L Cr 0.61 mg/dL BUN 12 mg/dL Phos 3.7 mg/dL    A1C with Estimated Average Glucose Result: 5.8 % (03-18-22 @ 11:28)

## 2022-10-31 NOTE — PROGRESS NOTE ADULT - SUBJECTIVE AND OBJECTIVE BOX
INTERVAL HPI/OVERNIGHT EVENTS:  Patient seen at bedside.  Reports some nausea.     VITAL SIGNS:  T(F): 97.7 (10-31-22 @ 21:44)  HR: 74 (10-31-22 @ 21:44)  BP: 110/52 (10-31-22 @ 21:44)  RR: 18 (10-31-22 @ 21:44)  SpO2: 98% (10-31-22 @ 21:44)  Wt(kg): --    PHYSICAL EXAM:    Constitutional: NAD, resting in bed comfortably  Eyes: EOMI, sclera non-icteric  Neck: supple, no LAP  Respiratory: CTA b/l, good air entry b/l, no wheezing, rhonchi or crackels  Cardiovascular: RRR, normal S1S2, no M/R/G  Gastrointestinal: soft, NTND  Extremities: no edema  Neurological: AAOx3, non focal  Skin: Normal temperature    MEDICATIONS  (STANDING):  acetaminophen     Tablet .. 650 milliGRAM(s) Oral every 6 hours  aMIOdarone    Tablet 200 milliGRAM(s) Oral daily  DULoxetine 60 milliGRAM(s) Oral daily  furosemide    Tablet 20 milliGRAM(s) Oral daily  gabapentin 100 milliGRAM(s) Oral three times a day  influenza  Vaccine (HIGH DOSE) 0.7 milliLiter(s) IntraMuscular once  levothyroxine 25 MICROGram(s) Oral daily  metoprolol succinate ER 50 milliGRAM(s) Oral daily  pancrelipase  (CREON 24,000 Lipase Units) 3 Capsule(s) Oral three times a day with meals  pantoprazole    Tablet 40 milliGRAM(s) Oral before breakfast  rivaroxaban 15 milliGRAM(s) Oral two times a day  sacubitril 49 mG/valsartan 51 mG 1 Tablet(s) Oral two times a day  tamsulosin 0.4 milliGRAM(s) Oral at bedtime    MEDICATIONS  (PRN):  artificial tears (preservative free) Ophthalmic Solution 1 Drop(s) Both EYES every 2 hours PRN Dry Eyes  diphenhydrAMINE 25 milliGRAM(s) Oral every 6 hours PRN Rash and/or Itching  melatonin 3 milliGRAM(s) Oral at bedtime PRN Insomnia  ondansetron Injectable 4 milliGRAM(s) IV Push every 6 hours PRN Nausea and/or Vomiting      Allergies    No Known Allergies    Intolerances        LABS:                        7.8    2.78  )-----------( 139      ( 30 Oct 2022 06:52 )             23.9                 RADIOLOGY & ADDITIONAL TESTS:  Studies reviewed.

## 2022-10-31 NOTE — DIETITIAN INITIAL EVALUATION ADULT - NS FNS DIET ORDER
Patient/Caregiver provided printed discharge information. Diet, Consistent Carbohydrate Renal w/Evening Snack:   Supplement Feeding Modality:  Oral  Ensure Enlive Cans or Servings Per Day:  2       Frequency:  Three Times a day (10-29-22 @ 14:35) [Active]

## 2022-10-31 NOTE — DIETITIAN NUTRITION RISK NOTIFICATION - TREATMENT: THE FOLLOWING DIET HAS BEEN RECOMMENDED
Diet, Consistent Carbohydrate Renal w/Evening Snack:   Supplement Feeding Modality:  Oral  Ensure Enlive Cans or Servings Per Day:  2       Frequency:  Three Times a day (10-29-22 @ 14:35) [Active]

## 2022-10-31 NOTE — PROGRESS NOTE ADULT - SUBJECTIVE AND OBJECTIVE BOX
Hospitalist Progress Note  Mile Carpio MD Pager # [phone]    OVERNIGHT EVENTS: MEME    SUBJECTIVE / INTERVAL HPI: Patient seen and examined at bedside. Pt. reports feeling comfortable. No abdominal pain. He says he occasionally feels some nausea. He denies dizziness/lightheadedness. All other ROS negative.     VITAL SIGNS:  Vital Signs Last 24 Hrs  T(C): 37 (31 Oct 2022 10:18), Max: 37.1 (30 Oct 2022 21:47)  T(F): 98.6 (31 Oct 2022 10:18), Max: 98.8 (30 Oct 2022 21:47)  HR: 75 (31 Oct 2022 10:18) (69 - 76)  BP: 99/54 (31 Oct 2022 10:18) (99/54 - 120/55)  BP(mean): --  RR: 18 (31 Oct 2022 10:18) (17 - 18)  SpO2: 96% (31 Oct 2022 10:18) (96% - 99%)    Parameters below as of 31 Oct 2022 10:18  Patient On (Oxygen Delivery Method): room air        PHYSICAL EXAM:    General: WDWN male  HEENT: NC/AT; PERRL, anicteric sclera; MMM  Neck: supple  Cardiovascular: +S1/S2; RRR  Respiratory: CTA B/L; no W/R/R  Gastrointestinal: soft, NT/ND; +BSx4 - wound vac over midline incision.   Extremities: WWP; no edema, clubbing or cyanosis  Vascular: 2+ radial, DP/PT pulses B/L  Neurological: AAOx3; no focal deficits    MEDICATIONS:  MEDICATIONS  (STANDING):  acetaminophen     Tablet .. 650 milliGRAM(s) Oral every 6 hours  ALPRAZolam 0.25 milliGRAM(s) Oral at bedtime  aMIOdarone    Tablet 200 milliGRAM(s) Oral daily  DULoxetine 60 milliGRAM(s) Oral daily  furosemide    Tablet 20 milliGRAM(s) Oral daily  gabapentin 100 milliGRAM(s) Oral three times a day  influenza  Vaccine (HIGH DOSE) 0.7 milliLiter(s) IntraMuscular once  levothyroxine 25 MICROGram(s) Oral daily  metoprolol succinate ER 50 milliGRAM(s) Oral daily  pancrelipase  (CREON 24,000 Lipase Units) 3 Capsule(s) Oral three times a day with meals  pantoprazole    Tablet 40 milliGRAM(s) Oral before breakfast  rivaroxaban 15 milliGRAM(s) Oral two times a day  sacubitril 49 mG/valsartan 51 mG 1 Tablet(s) Oral two times a day  tamsulosin 0.4 milliGRAM(s) Oral at bedtime    MEDICATIONS  (PRN):  artificial tears (preservative free) Ophthalmic Solution 1 Drop(s) Both EYES every 2 hours PRN Dry Eyes  diphenhydrAMINE 25 milliGRAM(s) Oral every 6 hours PRN Rash and/or Itching  melatonin 3 milliGRAM(s) Oral at bedtime PRN Insomnia  ondansetron Injectable 4 milliGRAM(s) IV Push every 6 hours PRN Nausea and/or Vomiting  oxyCODONE    IR 5 milliGRAM(s) Oral every 4 hours PRN Moderate Pain (4 - 6)      ALLERGIES:  Allergies    No Known Allergies    Intolerances        LABS:                        7.8    2.78  )-----------( 139      ( 30 Oct 2022 06:52 )             23.9               CAPILLARY BLOOD GLUCOSE          RADIOLOGY & ADDITIONAL TESTS: Reviewed.    ASSESSMENT:    PLAN:

## 2022-10-31 NOTE — DIETITIAN INITIAL EVALUATION ADULT - PERTINENT MEDS FT
MEDICATIONS  (STANDING):  acetaminophen     Tablet .. 650 milliGRAM(s) Oral every 6 hours  ALPRAZolam 0.25 milliGRAM(s) Oral at bedtime  aMIOdarone    Tablet 200 milliGRAM(s) Oral daily  DULoxetine 60 milliGRAM(s) Oral daily  furosemide    Tablet 20 milliGRAM(s) Oral daily  gabapentin 100 milliGRAM(s) Oral three times a day  influenza  Vaccine (HIGH DOSE) 0.7 milliLiter(s) IntraMuscular once  levothyroxine 25 MICROGram(s) Oral daily  metoprolol succinate ER 50 milliGRAM(s) Oral daily  pancrelipase  (CREON 24,000 Lipase Units) 3 Capsule(s) Oral three times a day with meals  pantoprazole    Tablet 40 milliGRAM(s) Oral before breakfast  rivaroxaban 15 milliGRAM(s) Oral two times a day  sacubitril 49 mG/valsartan 51 mG 1 Tablet(s) Oral two times a day  tamsulosin 0.4 milliGRAM(s) Oral at bedtime    MEDICATIONS  (PRN):  artificial tears (preservative free) Ophthalmic Solution 1 Drop(s) Both EYES every 2 hours PRN Dry Eyes  diphenhydrAMINE 25 milliGRAM(s) Oral every 6 hours PRN Rash and/or Itching  melatonin 3 milliGRAM(s) Oral at bedtime PRN Insomnia  ondansetron Injectable 4 milliGRAM(s) IV Push every 6 hours PRN Nausea and/or Vomiting  oxyCODONE    IR 5 milliGRAM(s) Oral every 4 hours PRN Moderate Pain (4 - 6)

## 2022-10-31 NOTE — DIETITIAN INITIAL EVALUATION ADULT - ADD RECOMMEND
1) Recommend consistent carb diet (d/c renal restriction)   2) Recommend multivitamin once daily for micronutrient provisions  3) Monitor weights, labs, BM's, skin integrity, p.o. intake.   4) Encourage PO intake and honor food preferences as able.   5) RD available prn

## 2022-10-31 NOTE — PROGRESS NOTE ADULT - ASSESSMENT
This is a 82 yo M PMH HTN, HLD, HFrEF s/p ACID, AS s/p TAVR, CAD s/p stents, pancreatic ca s/p chemo and radiation therapy s/p whipple on 10/26/21 with resulting non-healing abdominal wound treated with repeat STSG and vac (7/8/22, 9/29/22) both with poor take, now treated with local wound care. Currently presents to the ED for bleeding of the abdominal wound. There is no appreciable bleeding from the abdominal wound or STSG donor site. H/H appears stable since prior to latest admission. CT abdomen pelvis with concerning findings of thrombosed portal vein and new ascites, New wedge-shaped geographic regions of low attenuation throughout the liver, likely perfusional abnormalities related to new portal venous. Question ill-defined soft tissue encasing and obliterating the proximal main portal vein and portal splenic confluence, and encasing the hepatic artery, raising the possibility of locally recurrent disease.    Plan  - IR states no amenable lesion for biopsy, will need to start patient therapeutic anticoagulation  - CA-19 ordered-will follow result.  - PT evaluated patient and recommend TRINA  - Appreciate med/onc recs - on full dose xarelto for portal vein thrombosis  - VA duplex negative for DVT  - Cont VAC   - Dispo planning

## 2022-10-31 NOTE — PROGRESS NOTE ADULT - ASSESSMENT
81m with h/p pancreatic cancer s/p neoadjuvant gem/abraxane x 5 cycles completed in 9/21, s/p whipple operation 10/26/21. Pathology revealed pancreatic adenocarcinoma, 0/31LN, positive pancreatic resection margin requiring adjuvant RT (SBRT 4000 cGy in 5 fxs) completed in 1/19/22. Post op course c/b non healing abdominal wound, presenting for wound bleeding.     CT a/p 10/26:   New portal venous thrombosis involving the main, right, and left portal   veins. The portal splenic confluence and central splenic vein are not   well visualized and may be thrombosed.  Status post Whipple procedure. Question ill-defined soft tissue encasing   and obliterating the proximal main portal vein and portal splenic   confluence, and encasing the hepatic artery, raising the possibility of   locally recurrent disease.  New wedge-shaped geographic regions of low attenuation throughout the   liver, likely perfusional abnormalities related to new portal venous   thrombosis and possibly developing infarcts, with neoplasm thought to be   less likely.  New small volume ascites. New moderate bilateral pleural effusions.  Small focus of gas within the urinary bladder. Correlate for recent   instrumentation/catheterization.    CT results discussed with patient, his son at bedside and his daughter over the phone.     -CT A/p with concern for progression of disease given rising tumor markers, CT findings and rising signatera.   -Not amenable to tissue biopsy by IR  -Please consult advanced GI for evaluation for biopsy  -Patient with new PVT, as per patient,  has been off  AC (for h/o CAD) for more than 1 month given abdominal wound complications. Would resume full dose AC if and when cleared by plastic surgery.   -Supportive care, pain control, Nutrition, PT, DVT ppx  -Outpatient oncology f/u    Will follow. Please do not hesitate to call back with questions.     Raquel Vallecillo MD  Medical Oncology Attending  C: [phone]     time spent on direct patient care, interdisciplinary discussions and chart review.

## 2022-10-31 NOTE — PROGRESS NOTE ADULT - SUBJECTIVE AND OBJECTIVE BOX
Plastic Surgery Progress Note (pg [facility]: [id number], [facility]: [phone])    SUBJECTIVE  The patient was seen and examined. No acute events overnight.    OBJECTIVE  ___________________________________________________  VITAL SIGNS / I&O's   Vital Signs Last 24 Hrs  T(C): 36.9 (31 Oct 2022 06:31), Max: 37.1 (30 Oct 2022 21:47)  T(F): 98.5 (31 Oct 2022 06:31), Max: 98.8 (30 Oct 2022 21:47)  HR: 75 (31 Oct 2022 06:31) (69 - 85)  BP: 112/60 (31 Oct 2022 06:31) (105/52 - 127/52)  BP(mean): --  RR: 18 (31 Oct 2022 06:31) (17 - 18)  SpO2: 98% (31 Oct 2022 06:31) (98% - 99%)    Parameters below as of 31 Oct 2022 06:31  Patient On (Oxygen Delivery Method): room air          30 Oct 2022 07:01  -  31 Oct 2022 07:00  --------------------------------------------------------  IN:  Total IN: 0 mL    OUT:    Voided (mL): 450 mL  Total OUT: 450 mL    Total NET: -450 mL        ___________________________________________________  PHYSICAL EXAM    -- CONSTITUTIONAL: NAD, lying in bed  -- NEURO: Awake, alert  -- PULM: Non-labored respirations  -- ABDOMEN: VAC holding suction adequately.   ___________________________________________________  LABS                        7.8    2.78  )-----------( 139      ( 30 Oct 2022 06:52 )             23.9   ___________________________________________________  MEDICATIONS  (STANDING):  acetaminophen     Tablet .. 650 milliGRAM(s) Oral every 6 hours  ALPRAZolam 0.25 milliGRAM(s) Oral at bedtime  aMIOdarone    Tablet 200 milliGRAM(s) Oral daily  DULoxetine 60 milliGRAM(s) Oral daily  furosemide    Tablet 20 milliGRAM(s) Oral daily  gabapentin 100 milliGRAM(s) Oral three times a day  influenza  Vaccine (HIGH DOSE) 0.7 milliLiter(s) IntraMuscular once  levothyroxine 25 MICROGram(s) Oral daily  metoprolol succinate ER 50 milliGRAM(s) Oral daily  pancrelipase  (CREON 24,000 Lipase Units) 3 Capsule(s) Oral three times a day with meals  pantoprazole    Tablet 40 milliGRAM(s) Oral before breakfast  rivaroxaban 15 milliGRAM(s) Oral two times a day  sacubitril 49 mG/valsartan 51 mG 1 Tablet(s) Oral two times a day  tamsulosin 0.4 milliGRAM(s) Oral at bedtime    MEDICATIONS  (PRN):  artificial tears (preservative free) Ophthalmic Solution 1 Drop(s) Both EYES every 2 hours PRN Dry Eyes  diphenhydrAMINE 25 milliGRAM(s) Oral every 6 hours PRN Rash and/or Itching  melatonin 3 milliGRAM(s) Oral at bedtime PRN Insomnia  ondansetron Injectable 4 milliGRAM(s) IV Push every 6 hours PRN Nausea and/or Vomiting  oxyCODONE    IR 5 milliGRAM(s) Oral every 4 hours PRN Moderate Pain (4 - 6)

## 2022-10-31 NOTE — DIETITIAN INITIAL EVALUATION ADULT - OTHER INFO
Medical course: Per chart 81 year old male w/ HTN, HLD, HFrEF, VT, s/p ACID, AS s/p TAVR, CAD s/p stents, GERD, hypothyroid, anxiety, pancreatic ca s/p whipple 10/26/21, chemo and xrt with non-healing abdominal wound treated w/ HBOT and STSG 7/8/22, s/p repeat STSG wound vac placement on 9/29, p/w bleeding from abdominal incision site now s/p new wound vac this admission.    Nutrition interview: Patient A&Ox4, laying in bed during time of visit. Patient complains of nausea, states that zoframn prn helps releive symptoms, will also provide gingerale. No recent episodes of diarrhea or constipation, last BM noted on 10/30 per Pt. Denies any chewing/swallowing difficulties. No food allergies. Stated UBW: 198#. Food preferences explored and noted. Intake is fluctuates % per RN flowsheets and per pt. Receiving Ensure Plus HP 2x/day, reports good intake of supplement. Feeding skills: tray set up. Continues with creon to aid in digestion.

## 2022-10-31 NOTE — DIETITIAN INITIAL EVALUATION ADULT - ORAL INTAKE PTA/DIET HISTORY
PTA patient with good appetite. Takes vitamins D3, C, B6 and a multivitamin. Pt often eats take out.

## 2022-11-01 NOTE — PROGRESS NOTE ADULT - SUBJECTIVE AND OBJECTIVE BOX
Hospitalist Progress Note  Mile Carpio MD Pager # [phone]    OVERNIGHT EVENTS: MEME    SUBJECTIVE / INTERVAL HPI: Patient seen and examined at bedside. Pt. reports feeling well and does not have any complaints currently.     VITAL SIGNS:  Vital Signs Last 24 Hrs  T(C): 36.7 (01 Nov 2022 09:53), Max: 36.7 (01 Nov 2022 06:15)  T(F): 98.1 (01 Nov 2022 09:53), Max: 98.1 (01 Nov 2022 06:15)  HR: 69 (01 Nov 2022 09:53) (63 - 75)  BP: 117/56 (01 Nov 2022 09:53) (95/56 - 117/56)  BP(mean): --  RR: 18 (01 Nov 2022 09:53) (18 - 18)  SpO2: 96% (01 Nov 2022 09:53) (96% - 100%)    Parameters below as of 01 Nov 2022 09:53  Patient On (Oxygen Delivery Method): room air        PHYSICAL EXAM:    General: WDWN male  HEENT: NC/AT; PERRL, anicteric sclera; MMM  Neck: supple  Cardiovascular: +S1/S2; RRR  Respiratory: CTA B/L; no W/R/R  Gastrointestinal: soft, NT/ND; +BSx4. Midline wound vac in place   Extremities: WWP; no edema, clubbing or cyanosis  Vascular: 2+ radial, DP/PT pulses B/L  Neurological: AAOx3; no focal deficits    MEDICATIONS:  MEDICATIONS  (STANDING):  acetaminophen     Tablet .. 650 milliGRAM(s) Oral every 6 hours  aMIOdarone    Tablet 200 milliGRAM(s) Oral daily  DULoxetine 60 milliGRAM(s) Oral daily  furosemide    Tablet 20 milliGRAM(s) Oral daily  gabapentin 100 milliGRAM(s) Oral three times a day  influenza  Vaccine (HIGH DOSE) 0.7 milliLiter(s) IntraMuscular once  levothyroxine 25 MICROGram(s) Oral daily  metoprolol succinate ER 50 milliGRAM(s) Oral daily  pancrelipase  (CREON 24,000 Lipase Units) 3 Capsule(s) Oral three times a day with meals  pantoprazole    Tablet 40 milliGRAM(s) Oral before breakfast  rivaroxaban 15 milliGRAM(s) Oral two times a day  sacubitril 49 mG/valsartan 51 mG 1 Tablet(s) Oral two times a day  tamsulosin 0.4 milliGRAM(s) Oral at bedtime    MEDICATIONS  (PRN):  artificial tears (preservative free) Ophthalmic Solution 1 Drop(s) Both EYES every 2 hours PRN Dry Eyes  diphenhydrAMINE 25 milliGRAM(s) Oral every 6 hours PRN Rash and/or Itching  melatonin 3 milliGRAM(s) Oral at bedtime PRN Insomnia  metoclopramide Injectable 10 milliGRAM(s) IV Push every 6 hours PRN nausea  ondansetron Injectable 4 milliGRAM(s) IV Push every 6 hours PRN Nausea and/or Vomiting      ALLERGIES:  Allergies    No Known Allergies    Intolerances        LABS:                        8.4    3.46  )-----------( 134      ( 01 Nov 2022 06:56 )             26.4     11-01    136  |  102  |  12  ----------------------------<  132<H>  3.8   |  23  |  0.57    Ca    8.1<L>      01 Nov 2022 06:56  Phos  3.6     11-01  Mg     1.60     11-01          CAPILLARY BLOOD GLUCOSE          RADIOLOGY & ADDITIONAL TESTS: Reviewed.    ASSESSMENT:    PLAN:

## 2022-11-01 NOTE — CONSULT NOTE ADULT - ATTENDING COMMENTS
Patient seen and examined with the GI fellow. I agree with the above assessment and plan. Thank you for allowing us to care for your patient.    Can plan for EUS if it pt is a candidate for further therapy and the biopsy will . Will need cardiology clearance.

## 2022-11-01 NOTE — CONSULT NOTE ADULT - ASSESSMENT
82 yo M w/ HTN, HLD, HFrEF (EF 30-35%), VT, s/p ACID, AS s/p TAVR, CAD s/p stents, GERD, hypothyroid, anxiety, pancreatic ca s/p whipple 10/26/21, chemo and xrt with non-healing abdominal wound treated with recent admission/discharge on 9/29  p/w bleeding from abdominal incision site with CT abd shows- New portal venous thrombosis involving the main, right, and left portal veins. The portal splenic confluence and central splenic vein are not well visualized and may be thrombosed. Status post Whipple procedure. Question ill-defined soft tissue encasing and obliterating the proximal main portal vein and portal splenic confluence, and encasing the hepatic artery, raising the possibility of locally recurrent disease. IR initially consulted but no percutaneous window for Biopsy. [facility] called for evaluation for possible EUS w/ biopsy given concern for recurrence.     #Hx of pancreatic adenocarcinoma s/p whipple, chemo and radiation  #Non-healing abdominal wound  #Acute portal vein thrombosis   #Questionable ill-defined soft tissue encasing/obliterating portal vein and hepatic artery    Recommendations:      80 yo M w/ HTN, HLD, HFrEF (EF 30-35%), VT, s/p ACID, AS s/p TAVR, CAD s/p stents, GERD, hypothyroid, anxiety, pancreatic ca s/p whipple 10/26/21, chemo and xrt with non-healing abdominal wound treated with recent admission/discharge on 9/29  p/w bleeding from abdominal incision site with CT abd shows- New portal venous thrombosis involving the main, right, and left portal veins. The portal splenic confluence and central splenic vein are not well visualized and may be thrombosed. Status post Whipple procedure. Question ill-defined soft tissue encasing and obliterating the proximal main portal vein and portal splenic confluence, and encasing the hepatic artery, raising the possibility of locally recurrent disease. IR initially consulted but no percutaneous window for Biopsy. [facility] called for evaluation for possible EUS w/ biopsy given concern for recurrence.     #Hx of pancreatic adenocarcinoma s/p whipple, chemo and radiation  #Non-healing abdominal wound  #Acute portal vein thrombosis   #Questionable ill-defined soft tissue encasing/obliterating portal vein and hepatic artery    Recommendations:  -Discussed with Hem/Onc and although they are not considering chemotherapy (due to poor wound healing at this time) they would assess for check-point inh marker for possible immunotherapy.  -Can plan for EUS however will need patient to be off AC for at least 48 hrs    Recommendations preliminary until signed by attending.     Shay Crespo MD  Gastroenterology/Hepatology Fellow  1st option: [phone] (text or call), ONLY available from 7:00 am to 5:00 pm.   **Contact on-call GI fellow via answering service ([phone]) from 5pm-7am AND on weekends/holidays**  2nd option: Available via Microsoft Teams  3rd option: Pager: [phone]            82 yo M w/ HTN, HLD, HFrEF (EF 30-35%), VT, s/p ACID, AS s/p TAVR, CAD s/p stents, GERD, hypothyroid, anxiety, pancreatic ca s/p whipple 10/26/21, chemo and xrt with non-healing abdominal wound treated with recent admission/discharge on 9/29  p/w bleeding from abdominal incision site with CT abd shows- New portal venous thrombosis involving the main, right, and left portal veins. The portal splenic confluence and central splenic vein are not well visualized and may be thrombosed. Status post Whipple procedure. Question ill-defined soft tissue encasing and obliterating the proximal main portal vein and portal splenic confluence, and encasing the hepatic artery, raising the possibility of locally recurrent disease. IR initially consulted but no percutaneous window for Biopsy. [facility] called for evaluation for possible EUS w/ biopsy given concern for recurrence.     #Hx of pancreatic adenocarcinoma s/p whipple, chemo and radiation  #Non-healing abdominal wound  #Acute portal vein thrombosis   #Questionable ill-defined soft tissue encasing/obliterating portal vein and hepatic artery    Recommendations:  -Discussed with Hem/Onc and although they are not considering chemotherapy (due to poor wound healing at this time) they would assess for check-point inh marker for possible immunotherapy.  -Can plan for EUS however will need patient to be off Rivaroxaban for at least 48 hrs (can use Heparin drip in the meanwhile, shorted half-life)  -Patient will need Cardiac risk stratification and optimization as well    Recommendations preliminary until signed by attending.     Shay Crespo MD  Gastroenterology/Hepatology Fellow  1st option: [phone] (text or call), ONLY available from 7:00 am to 5:00 pm.   **Contact on-call GI fellow via answering service ([phone]) from 5pm-7am AND on weekends/holidays**  2nd option: Available via Microsoft Teams  3rd option: Pager: [phone]

## 2022-11-01 NOTE — PROGRESS NOTE ADULT - SUBJECTIVE AND OBJECTIVE BOX
Plastic Surgery Progress Note (pg [id number]: [id number], [facility]: [phone])    SUBJECTIVE  The patient was seen and examined. No acute events overnight.    OBJECTIVE  ___________________________________________________  VITAL SIGNS / I&O's   Vital Signs Last 24 Hrs  T(C): 36.7 (01 Nov 2022 06:15), Max: 37 (31 Oct 2022 10:18)  T(F): 98.1 (01 Nov 2022 06:15), Max: 98.6 (31 Oct 2022 10:18)  HR: 75 (01 Nov 2022 06:15) (63 - 75)  BP: 95/56 (01 Nov 2022 06:15) (95/56 - 110/52)  BP(mean): --  RR: 18 (01 Nov 2022 06:15) (18 - 18)  SpO2: 98% (01 Nov 2022 06:15) (96% - 100%)    Parameters below as of 01 Nov 2022 06:15  Patient On (Oxygen Delivery Method): room air          31 Oct 2022 07:01  -  01 Nov 2022 07:00  --------------------------------------------------------  IN:  Total IN: 0 mL    OUT:    VAC (Vacuum Assisted Closure) System (mL): 0 mL    Voided (mL): 400 mL  Total OUT: 400 mL    Total NET: -400 mL    ___________________________________________________  PHYSICAL EXAM    -- CONSTITUTIONAL: NAD, lying in bed  -- NEURO: Awake, alert  -- PULM: Non-labored respirations  -- ABDOMEN: VAC holding suction adequately.   ___________________________________________________  MEDICATIONS  (STANDING):  acetaminophen     Tablet .. 650 milliGRAM(s) Oral every 6 hours  aMIOdarone    Tablet 200 milliGRAM(s) Oral daily  DULoxetine 60 milliGRAM(s) Oral daily  furosemide    Tablet 20 milliGRAM(s) Oral daily  gabapentin 100 milliGRAM(s) Oral three times a day  influenza  Vaccine (HIGH DOSE) 0.7 milliLiter(s) IntraMuscular once  levothyroxine 25 MICROGram(s) Oral daily  metoprolol succinate ER 50 milliGRAM(s) Oral daily  pancrelipase  (CREON 24,000 Lipase Units) 3 Capsule(s) Oral three times a day with meals  pantoprazole    Tablet 40 milliGRAM(s) Oral before breakfast  rivaroxaban 15 milliGRAM(s) Oral two times a day  sacubitril 49 mG/valsartan 51 mG 1 Tablet(s) Oral two times a day  tamsulosin 0.4 milliGRAM(s) Oral at bedtime    MEDICATIONS  (PRN):  artificial tears (preservative free) Ophthalmic Solution 1 Drop(s) Both EYES every 2 hours PRN Dry Eyes  diphenhydrAMINE 25 milliGRAM(s) Oral every 6 hours PRN Rash and/or Itching  melatonin 3 milliGRAM(s) Oral at bedtime PRN Insomnia  metoclopramide Injectable 10 milliGRAM(s) IV Push every 6 hours PRN nausea  ondansetron Injectable 4 milliGRAM(s) IV Push every 6 hours PRN Nausea and/or Vomiting

## 2022-11-01 NOTE — CONSULT NOTE ADULT - SUBJECTIVE AND OBJECTIVE BOX
HPI:   80 yo M w/ HTN, HLD, HFrEF (EF 30-35%), VT, s/p ACID, AS s/p TAVR, CAD s/p stents, GERD, hypothyroid, anxiety, pancreatic ca s/p whipple 10/26/21, chemo and xrt with non-healing abdominal wound treated with recent admission/discharge on   p/w bleeding from abdominal incision site with CT abd shows- New portal venous thrombosis involving the main, right, and left portal veins. The portal splenic confluence and central splenic vein are not well visualized and may be thrombosed. Status post Whipple procedure. Question ill-defined soft tissue encasing and obliterating the proximal main portal vein and portal splenic confluence, and encasing the hepatic artery, raising the possibility of locally recurrent disease. IR initially consulted but no percutaneous window for Biopsy. [facility] called for evaluation for possible EUS w/ biopsy given concern for recurrence.     Allergies:  No Known Allergies        Hospital Medications:  acetaminophen     Tablet .. 650 milliGRAM(s) Oral every 6 hours  aMIOdarone    Tablet 200 milliGRAM(s) Oral daily  artificial tears (preservative free) Ophthalmic Solution 1 Drop(s) Both EYES every 2 hours PRN  diphenhydrAMINE 25 milliGRAM(s) Oral every 6 hours PRN  DULoxetine 60 milliGRAM(s) Oral daily  furosemide    Tablet 20 milliGRAM(s) Oral daily  gabapentin 100 milliGRAM(s) Oral three times a day  influenza  Vaccine (HIGH DOSE) 0.7 milliLiter(s) IntraMuscular once  levothyroxine 25 MICROGram(s) Oral daily  melatonin 3 milliGRAM(s) Oral at bedtime PRN  metoclopramide Injectable 10 milliGRAM(s) IV Push every 6 hours PRN  metoprolol succinate ER 50 milliGRAM(s) Oral daily  ondansetron Injectable 4 milliGRAM(s) IV Push every 6 hours PRN  pancrelipase  (CREON 24,000 Lipase Units) 3 Capsule(s) Oral three times a day with meals  pantoprazole    Tablet 40 milliGRAM(s) Oral before breakfast  rivaroxaban 15 milliGRAM(s) Oral two times a day  sacubitril 49 mG/valsartan 51 mG 1 Tablet(s) Oral two times a day  tamsulosin 0.4 milliGRAM(s) Oral at bedtime      PMHX/PSHX:  HTN (hypertension)    HLD (hyperlipidemia)    GERD (gastroesophageal reflux disease)    Cardiomyopathy    MSSA bacteremia    Acute osteomyelitis    Pulmonary embolism    Pancreas cancer    [place] (hard of hearing)    Other complications of procedures, not elsewhere classified, subsequent encounter    History of total hip replacement    History of laminectomy    ICD (implantable cardiac defibrillator) in place    Benign testicular tumor    History of hip replacement    Status post amputation of toe of right foot    Status post fracture of femur    S/P TAVR (transcatheter aortic valve replacement)    H/O Whipple procedure        Family history:  Family history of stroke (Mother)        Social History: no smoking    ROS:   All negative except as mentioned above.     PHYSICAL EXAM:   General: WDWN male resting in bed   HEENT: NC/AT; PERRL, anicteric sclera; MMM  Neck: supple  Cardiovascular: +S1/S2; RRR  Respiratory: CTA B/L; no W/R/R  Gastrointestinal: soft, NT/ND; +BSx4. Midline wound vac in place.   Extremities: WWP; no edema, clubbing or cyanosis  Vascular: 2+ radial, DP/PT pulses B/L  Neurological: AAOx3; no focal deficits    Vital Signs:  Vital Signs Last 24 Hrs  T(C): 36.7 (2022 09:53), Max: 36.7 (2022 06:15)  T(F): 98.1 (2022 09:53), Max: 98.1 (2022 06:15)  HR: 69 (2022 09:53) (63 - 75)  BP: 117/56 (2022 09:53) (95/56 - 117/56)  BP(mean): --  RR: 18 (2022 09:53) (18 - 18)  SpO2: 96% (2022 09:53) (96% - 100%)    Parameters below as of 2022 09:53  Patient On (Oxygen Delivery Method): room air      Daily     Daily Weight in k (2022 06:15)    LABS:                        8.4    3.46  )-----------( 134      ( 2022 06:56 )             26.4     Mean Cell Volume: 90.4 fL (- @ 06:56)        136  |  102  |  12  ----------------------------<  132<H>  3.8   |  23  |  0.57    Ca    8.1<L>      2022 06:56  Phos  3.6       Mg     1.60                                         8.4    3.46  )-----------( 134      ( 2022 06:56 )             26.4                         7.8    2.78  )-----------( 139      ( 30 Oct 2022 06:52 )             23.9     Imaging:  < from: CT Abdomen and Pelvis w/ IV Cont (10.26.22 @ 12:26) >  FINDINGS:  LOWER CHEST: Partially imaged cardiac device in the left chest wall.   Cardiomegaly. Coronary artery and mitral annular calcification. TAVR. New   moderate bilateral pleural effusions. Bibasilar atelectasis. Mild   bilateral gynecomastia.    LIVER: Streak artifact from spinal hardware limits assessment. New   geographic and wedge-shaped regions of hypoattenuation within the right   lobe and medial left lobe.  BILE DUCTS: Normal caliber.  GALLBLADDER: Cholecystectomy.  SPLEEN: Within normal limits.  PANCREAS: Status post Whipple procedure. Stent spanning the pancreatic   duct andthe pancreaticojejunostomy. No pancreatic duct dilation. The   residual pancreas is atrophic. Question ill-defined soft tissue encasing   and obliterating the proximal main portal vein and portal splenic   confluence, and encasing the common and proper hepatic arteries.  ADRENALS: Within normal limits.  KIDNEYS/URETERS: Left renal cyst. Nonobstructing 2 mm right renal   calculus. No hydronephrosis.    BLADDER: Partially obscured by streak artifact. Small focus of gas within   the urinary bladder.  REPRODUCTIVE ORGANS: Partially obscured by streak artifact. The prostate   is normal in size.    BOWEL: Status post Whipple procedure unremarkable appearance of the   gastrojejunostomy. No bowel obstruction. Appendix is normal.  PERITONEUM: Small volume of free fluid, new since the prior CT.  VESSELS: New filling defect within the main portal vein and extending   into the right and left portal veins, concerning for near occlusive to   occlusive portal venous thrombosis. The portal splenic confluence and the   central splenic vein are not well visualized and may be thrombosed. The   mid to hilar splenic vein is patent. The superior mesenteric vein is   patent. Atherosclerotic changes.  RETROPERITONEUM/LYMPH NODES: No lymphadenopathy.  ABDOMINAL WALL: Right inguinal hernia containing fat and trace peritoneal   fluid. Postsurgical changes in the ventral abdominal wall with associated   cutaneous irregularity, likely representing the patient's known   nonhealing wound. No associated drainable fluid collection.  BONES: Thoracolumbar fusion hardware extending into the bony pelvis. Left   hip prosthesis. Right femoral head avascular necrosis. Diffuse osseous   demineralization. Degenerative changes.    IMPRESSION:  New portal venous thrombosis involving the main, right, and left portal   veins. The portal splenic confluence and central splenic vein are not   well visualized and may be thrombosed.    Status post Whipple procedure. Question ill-defined soft tissue encasing   and obliterating the proximal main portal vein and portal splenic   confluence, and encasing the hepatic artery, raising the possibility of   locally recurrent disease.    New wedge-shaped geographic regions of low attenuation throughout the   liver, likely perfusional abnormalities related to new portal venous   thrombosis and possibly developing infarcts, with neoplasm thought to be   less likely.    New small volume ascites. New moderate bilateral pleural effusions.    Small focus of gas within the urinary bladder. Correlate for recent   instrumentation/catheterization.    < end of copied text >

## 2022-11-01 NOTE — PROGRESS NOTE ADULT - ASSESSMENT
This is a 80 yo M PMH HTN, HLD, HFrEF s/p ACID, AS s/p TAVR, CAD s/p stents, pancreatic ca s/p chemo and radiation therapy s/p whipple on 10/26/21 with resulting non-healing abdominal wound treated with repeat STSG and vac (7/8/22, 9/29/22) both with poor take, now treated with local wound care. Currently presents to the ED for bleeding of the abdominal wound. There is no appreciable bleeding from the abdominal wound or STSG donor site. H/H appears stable since prior to latest admission. CT abdomen pelvis with concerning findings of thrombosed portal vein and new ascites, New wedge-shaped geographic regions of low attenuation throughout the liver, likely perfusional abnormalities related to new portal venous. Question ill-defined soft tissue encasing and obliterating the proximal main portal vein and portal splenic confluence, and encasing the hepatic artery, raising the possibility of locally recurrent disease.    Plan  - IR states no amenable lesion for biopsy, will need to start patient therapeutic anticoagulation  - CA-19 ordered-will follow result.  - PT evaluated patient and recommend TRINA  - Appreciate med/onc recs - on full dose xarelto for portal vein thrombosis  - VA duplex negative for DVT  - Cont VAC   - Dispo planning

## 2022-11-01 NOTE — REASON FOR VISIT
Patient requesting refill on Acetaminophen-codeine (TYLENOL) 30 mg  Last office visit 09/27/2022   shows last refill on 09/18/2022  Patient does have a pain contract on file with [facility]  Patient last UDS 02/05/2021 was consistent with current therapy     CODEINE  Present  Not Detected     MORPHINE  Present  Not Detected     6-ACETYLMORPHINE  Not Detected  Not Detected     OXYCODONE  Not Detected  Not Detected     NOROYXCODONE  Not Detected  Not Detected     OXYMORPHONE  Not Detected  Not Detected     NOROXYMORPHONE  Not Detected  Not Detected     HYDROCODONE  Present         [Follow-Up Visit] : a follow-up

## 2022-11-02 NOTE — PROGRESS NOTE ADULT - ASSESSMENT
ASSESSMENT:  This is a 80 yo M PMH HTN, HLD, HFrEF s/p ACID, AS s/p TAVR, CAD s/p stents, pancreatic ca s/p chemo and radiation therapy s/p whipple on 10/26/21 with resulting non-healing abdominal wound treated with repeat STSG and vac (7/8/22, 9/29/22) both with poor take, now treated with local wound care. Presented this admission for bleeding of the abdominal wound - resolved.   CT abdomen pelvis performed on this admission with concerning findings of thrombosed portal vein and new ascites, New wedge-shaped geographic regions of low attenuation throughout the liver, likely perfusional abnormalities related to new portal venous. Question ill-defined soft tissue encasing and obliterating the proximal main portal vein and portal splenic confluence, and encasing the hepatic artery, raising the possibility of locally recurrent disease. Tumor markers also increased.   IR consulted and unable to perform tissue biopsy. [facility] consulted and able to perform EUS to biopsy if pt is a candidate for further therapy and the biopsy will .     Plan  - Patient cleared for discharge from PRS perspective - wound progressing appropriately and showing signs of good healing. Patient accepted to [facility].  - Further inpatient oncologic workup and management, and any plans for intervention to be directed by heme/onc, medicine, and GI.   - Plan discussed with PRS attending, Dr. Gallegos.     Caitlyn Hutchins MD  Plastic Surgery Resident PGY3  [facility]: [phone]  [facility]: [id number]

## 2022-11-02 NOTE — PROGRESS NOTE ADULT - ASSESSMENT
81m with h/p pancreatic cancer s/p neoadjuvant gem/abraxane x 5 cycles completed in 9/21, s/p whipple operation 10/26/21. Pathology revealed pancreatic adenocarcinoma, 0/31LN, positive pancreatic resection margin requiring adjuvant RT (SBRT 4000 cGy in 5 fxs) completed in 1/19/22. Post op course c/b non healing abdominal wound, presenting for wound bleeding.     CT a/p 10/26:   New portal venous thrombosis involving the main, right, and left portal   veins. The portal splenic confluence and central splenic vein are not   well visualized and may be thrombosed.  Status post Whipple procedure. Question ill-defined soft tissue encasing   and obliterating the proximal main portal vein and portal splenic   confluence, and encasing the hepatic artery, raising the possibility of   locally recurrent disease.  New wedge-shaped geographic regions of low attenuation throughout the   liver, likely perfusional abnormalities related to new portal venous   thrombosis and possibly developing infarcts, with neoplasm thought to be   less likely.  New small volume ascites. New moderate bilateral pleural effusions.  Small focus of gas within the urinary bladder. Correlate for recent   instrumentation/catheterization.    CT results discussed with patient, his son at bedside and his daughter over the phone.     -CT A/p with concern for progression of disease given rising tumor markers, CT findings and rising signatera.   -Not amenable to tissue biopsy by IR  -Tissue is needed to guide the next steps in management. Will need molecular testing and PDL1 on the biopsy specimen.   -[facility] input apprecaited, Cardiology clearance needed and xarelto needs to be on hold for 48 hours prior to procedure.   -I spoke to daughter Una over the phone and went over the recommendations. I discussed with Una that oncology has been following the patient on a daily basis, we have discussed care with different children including Chandler, Pippa and Una. The CT scan results were reviewed with Family last week on Thursday and Chandler recorded our conversation on his phone. Pipap was over the phone during this. Patient was again seen over the weekend, on Monday and yesterday. We have communicated with family on multiple occasions. It seems that the issue is that there are many family members and information is not relayed. Una understands and agrees that we should communicate with one family member and that person should relay the information to everyone else. She asks that we call her with any updated and she will speak to other family members.  She asks to speak to advanced GI.   -I will be available on Friday 11:30am - 12am in case family would like to have a meeting with oncology. Una will discuss with other siblings and get back to us about family meeting.  -Palliative care consult   -Patient with new PVT, as per patient,  has been off  AC (for h/o CAD) for more than 1 month given abdominal wound complications. Would resume full dose AC if and when cleared by plastic surgery.   -Supportive care, pain control, Nutrition, PT, DVT ppx  -Outpatient oncology f/u    Will follow. Please do not hesitate to call back with questions.     Raquel Vallecillo MD  Medical Oncology Attending  C: [phone]     time spent on direct patient care, interdisciplinary discussions and chart review.

## 2022-11-02 NOTE — PROGRESS NOTE ADULT - SUBJECTIVE AND OBJECTIVE BOX
INTERVAL HPI/OVERNIGHT EVENTS:  Patient seen at bedside.  Has increased abdominal pain and nausea.    VITAL SIGNS:  T(F): 97.4 (11-02-22 @ 13:15)  HR: 77 (11-02-22 @ 13:15)  BP: 90/45 (11-02-22 @ 13:15)  RR: 18 (11-02-22 @ 13:15)  SpO2: 98% (11-02-22 @ 13:15)  Wt(kg): --    PHYSICAL EXAM:    Constitutional: NAD, resting in bed comfortably  Eyes: EOMI, sclera non-icteric  Neck: supple, no LAP  Respiratory: CTA b/l, good air entry b/l, no wheezing, rhonchi or crackels  Cardiovascular: RRR, normal S1S2, no M/R/G  Gastrointestinal: soft, NTND  Extremities: no edema  Neurological: AAOx3, non focal  Skin: Normal temperature    MEDICATIONS  (STANDING):  acetaminophen     Tablet .. 650 milliGRAM(s) Oral every 6 hours  aMIOdarone    Tablet 200 milliGRAM(s) Oral daily  DULoxetine 60 milliGRAM(s) Oral daily  furosemide    Tablet 20 milliGRAM(s) Oral daily  gabapentin 100 milliGRAM(s) Oral three times a day  influenza  Vaccine (HIGH DOSE) 0.7 milliLiter(s) IntraMuscular once  levothyroxine 25 MICROGram(s) Oral daily  metoprolol succinate ER 50 milliGRAM(s) Oral daily  pancrelipase  (CREON 24,000 Lipase Units) 3 Capsule(s) Oral three times a day with meals  pantoprazole    Tablet 40 milliGRAM(s) Oral before breakfast  rivaroxaban 15 milliGRAM(s) Oral two times a day  sacubitril 49 mG/valsartan 51 mG 1 Tablet(s) Oral two times a day  tamsulosin 0.4 milliGRAM(s) Oral at bedtime    MEDICATIONS  (PRN):  artificial tears (preservative free) Ophthalmic Solution 1 Drop(s) Both EYES every 2 hours PRN Dry Eyes  diphenhydrAMINE 25 milliGRAM(s) Oral every 6 hours PRN Rash and/or Itching  melatonin 3 milliGRAM(s) Oral at bedtime PRN Insomnia  metoclopramide Injectable 10 milliGRAM(s) IV Push every 4 hours PRN Nausea and/or Vomiting  ondansetron Injectable 4 milliGRAM(s) IV Push every 6 hours PRN Nausea and/or Vomiting      Allergies    No Known Allergies    Intolerances        LABS:                        8.4    3.46  )-----------( 134      ( 01 Nov 2022 06:56 )             26.4     11-01    136  |  102  |  12  ----------------------------<  132<H>  3.8   |  23  |  0.57    Ca    8.1<L>      01 Nov 2022 06:56  Phos  3.6     11-01  Mg     1.60     11-01            RADIOLOGY & ADDITIONAL TESTS:  Studies reviewed.

## 2022-11-02 NOTE — CONSULT NOTE ADULT - SUBJECTIVE AND OBJECTIVE BOX
Chief Complaint:     Briefly, we were asked to perform preoperative risk assessment prior to an endoscopic procedure on the 81 year old man with reduced EF, s/p TAVR, with remote CAD, and a subcutaneous ICD.    The patient is seen by Dr. LILIANA Ribera in [place] (a [facility] cardiology practice) and I reviewed his evaluation from earlier this year.    The patient currently denies angina or HF sx.       HPI:   82 yo M w/ HTN, HLD, HFrEF (EF 30-35%), VT, s/p ACID, AS s/p TAVR, CAD s/p stents, GERD, hypothyroid, anxiety, pancreatic ca s/p whipple 10/26/21, chemo and xrt with non-healing abdominal wound treated w/ HBOT and STSG 22, s/p repeat STSG wound vac placement on  resulting in poor take at discharge, p/w bleeding from abdominal incision site. pt endorses bleeding 1d prior, resolved today. denies abd pain. pt also endorses bleeding from L thigh graft site, although appears hemostatic in the ED. denies fevers, chills, n/v, diarrhea, dysuria.      PAST MEDICAL & SURGICAL HISTORY:  HTN (hypertension)      HLD (hyperlipidemia)      GERD (gastroesophageal reflux disease)      Cardiomyopathy      MSSA bacteremia  2021      Acute osteomyelitis      Pulmonary embolism      Pancreas cancer  chemo, s/p Whipple      [place] (hard of hearing)  B/L hearing aids      Other complications of procedures, not elsewhere classified, subsequent encounter      History of total hip replacement  left X 1, 2 revisions      History of laminectomy  X3      ICD (implantable cardiac defibrillator) in place  implanted , replaced 10/4/2021 [employer] Subcutaneous ICD      Benign testicular tumor  radiation      Status post amputation of toe of right foot  2021      Status post fracture of femur  2017 left with hardware      S/P TAVR (transcatheter aortic valve replacement)  2021      H/O Whipple procedure  2021, 10/2021        Allergies    No Known Allergies    Intolerances      Home Medications:  amiodarone 200 mg oral tablet: 1 tab(s) orally once a day AM (29 Sep 2022 14:20)  Creon 24,000 units oral delayed release capsule: 3 cap(s) orally 3 times a day (29 Sep 2022 14:20)  DULoxetine 60 mg oral delayed release capsule: 1 cap(s) orally once a day (at bedtime) (29 Sep 2022 14:20)  Entresto 49 mg-51 mg oral tablet: 1 tab(s) orally 2 times a day (29 Sep 2022 14:20)  gabapentin 100 mg oral tablet: 1 tab(s) orally 3 times a day, As Needed (29 Sep 2022 14:20)  Lasix 20 mg oral tablet: 1 tab(s) orally once a day AM (29 Sep 2022 14:20)  levothyroxine 25 mcg (0.025 mg) oral tablet: 1 tab(s) orally once a day AM (29 Sep 2022 14:20)  Metoprolol Succinate ER 50 mg oral tablet, extended release: 1 tab(s) orally once a day AM (29 Sep 2022 14:20)  pantoprazole 40 mg oral delayed release tablet: 1 tab(s) orally once a day AM (29 Sep 2022 14:20)  petrolatum topical ointment: 1 application topically once a day (07 Oct 2022 10:36)  QUEtiapine 25 mg oral tablet: 1 tab(s) orally once a day (at bedtime) (29 Sep 2022 14:20)  tamsulosin 0.4 mg oral capsule: 1 cap(s) orally once a day (at bedtime) (29 Sep 2022 14:20)  Xanax 0.25 mg oral tablet: 1 tab(s) orally once a day, As Needed (29 Sep 2022 14:20)    MEDICATIONS  (STANDING):      SOCIAL HISTORY:  FAMILY HISTORY:  Family history of stroke (Mother)        ___________________________________________  OBJECTIVE:  Vital Signs Last 24 Hrs  T(C): 36.8 (24 Oct 2022 11:10), Max: 36.8 (24 Oct 2022 11:10)  T(F): 98.3 (24 Oct 2022 11:10), Max: 98.3 (24 Oct 2022 11:10)  HR: 65 (24 Oct 2022 11:10) (65 - 65)  BP: 122/59 (24 Oct 2022 11:10) (122/59 - 122/59)  BP(mean): --  RR: 16 (24 Oct 2022 11:10) (16 - 16)  SpO2: 100% (24 Oct 2022 11:10) (100% - 100%)    Parameters below as of 24 Oct 2022 11:10  Patient On (Oxygen Delivery Method): room air           (24 Oct 2022 16:39)    PMH:   HTN (hypertension)    HLD (hyperlipidemia)    GERD (gastroesophageal reflux disease)    Cardiomyopathy    MSSA bacteremia    Acute osteomyelitis    Pulmonary embolism    Pancreas cancer    [place] (hard of hearing)    Other complications of procedures, not elsewhere classified, subsequent encounter      PSH:   History of total hip replacement    History of laminectomy    ICD (implantable cardiac defibrillator) in place    Benign testicular tumor    History of hip replacement    Status post amputation of toe of right foot    Status post fracture of femur    S/P TAVR (transcatheter aortic valve replacement)    H/O Whipple procedure      Medications:   acetaminophen     Tablet .. 650 milliGRAM(s) Oral every 6 hours  aMIOdarone    Tablet 200 milliGRAM(s) Oral daily  artificial tears (preservative free) Ophthalmic Solution 1 Drop(s) Both EYES every 2 hours PRN  diphenhydrAMINE 25 milliGRAM(s) Oral every 6 hours PRN  DULoxetine 60 milliGRAM(s) Oral daily  furosemide    Tablet 20 milliGRAM(s) Oral daily  gabapentin 100 milliGRAM(s) Oral three times a day  heparin   Injectable 7000 Unit(s) IV Push every 6 hours PRN  heparin   Injectable 3500 Unit(s) IV Push every 6 hours PRN  heparin  Infusion.  Unit(s)/Hr IV Continuous <Continuous>  influenza  Vaccine (HIGH DOSE) 0.7 milliLiter(s) IntraMuscular once  levothyroxine 25 MICROGram(s) Oral daily  melatonin 3 milliGRAM(s) Oral at bedtime PRN  metoclopramide Injectable 10 milliGRAM(s) IV Push every 4 hours PRN  metoprolol succinate ER 50 milliGRAM(s) Oral daily  ondansetron Injectable 4 milliGRAM(s) IV Push every 6 hours PRN  pancrelipase  (CREON 24,000 Lipase Units) 3 Capsule(s) Oral three times a day with meals  pantoprazole    Tablet 40 milliGRAM(s) Oral before breakfast  sacubitril 49 mG/valsartan 51 mG 1 Tablet(s) Oral two times a day  tamsulosin 0.4 milliGRAM(s) Oral at bedtime    Allergies:  No Known Allergies    FAMILY HISTORY:  Family history of stroke (Mother)      Social History:  Smoking:  Alcohol:  Drugs:    Review of Systems:  Constitutional: [ ] Fever [ ] Chills [ ] Fatigue [ ] Weight change   HEENT: [ ] Blurred vision [ ] Eye Pain [ ] Headache [ ] Runny nose [ ] Sore Throat   Respiratory: [ ] Cough [ ] Wheezing [ ] Shortness of breath  Cardiovascular: [ ] Chest Pain [ ] Palpitations [ ] CHAUDHARY [ ] PND [ ] Orthopnea  Gastrointestinal: [ ] Abdominal Pain [ ] Diarrhea [ ] Constipation [ ] Hemorrhoids [ ] Nausea [ ] Vomiting  Genitourinary: [ ] Nocturia [ ] Dysuria [ ] Incontinence  Extremities: [ ] Swelling [ ] Joint Pain  Neurologic: [ ] Focal deficit [ ] Paresthesias [ ] Syncope  Lymphatic: [ ] Swelling [ ] Lymphadenopathy   Skin: [ ] Rash [ ] Ecchymoses [ ] Wounds [ ] Lesions  Psychiatry: [ ] Depression [ ] Suicidal/Homicidal Ideation [ ] Anxiety [ ] Sleep Disturbances  [ ] 10 point review of systems is otherwise negative except as mentioned above            [ ]Unable to obtain    Physical Exam:  T(C): 36.3 (22 @ 13:15), Max: 37.1 (22 @ 22:17)  HR: 72 (22 @ 14:19) (64 - 96)  BP: 97/45 (22 @ 14:19) (90/45 - 116/49)  RR: 18 (22 @ 13:15) (18 - 18)  SpO2: 98% (22 @ 13:15) (97% - 100%)  Wt(kg): --     @ 07:01  -   @ 07:00  --------------------------------------------------------  IN: 0 mL / OUT: 750 mL / NET: -750 mL      Daily     Daily Weight in k (2022 15:28)    Appearance: [ ] Normal [ ] NAD  Eyes: [ ] PERRL [ ] EOMI  HENT: [ ] Normal oral [place] [ ]NC/AT  Cardiovascular: x[ ] S1 [ x] S2 [ ] RRR [ ] No m/r/g [ ]No edema [ ] JVP  Procedural Access Site: [ ] No hematoma [ ] Non-tender to palpation [ ] 2+ pulse [ ] No bruit [ ] No Ecchymosis  Respiratory: [x ] Clear to auscultation bilaterally  Gastrointestinal: [ ] Soft [ ] Non-tender [ ] Non-distended [ ] BS+  Musculoskeletal: [ ] No clubbing [ ] No joint deformity   Neurologic: [ ] Non-focal  Lymphatic: [ ] No lymphadenopathy  Psychiatry: [ ] AAOx3 [ ] Mood & affect appropriate  Skin: [ ] No rashes [ ] No ecchymoses [ ] No cyanosis    Cardiovascular Diagnostic Testing:  ECG: NSR, LBBB    Echo:    Stress Testing:    Cath:    Interpretation of Telemetry:    Imaging:    Labs:                        8.4    3.46  )-----------( 134      ( 2022 06:56 )             26.4         136  |  102  |  12  ----------------------------<  132<H>  3.8   |  23  |  0.57    Ca    8.1<L>      2022 06:56  Phos  3.6       Mg     1.60           PT/INR - ( 2022 14:15 )   PT: 41.7 sec;   INR: 3.55 ratio         PTT - ( 2022 14:15 )  PTT:42.2 sec

## 2022-11-02 NOTE — PROGRESS NOTE ADULT - SUBJECTIVE AND OBJECTIVE BOX
Hospitalist Progress Note  Mile Carpio MD Pager # [phone]    OVERNIGHT EVENTS: MEME    SUBJECTIVE / INTERVAL HPI: Patient seen and examined at bedside. Pt reports that zofran helps resolve his abdominal discomfort. He continues to have bowel movements. No other aches/pains.     VITAL SIGNS:  Vital Signs Last 24 Hrs  T(C): 36.9 (02 Nov 2022 10:26), Max: 37.4 (01 Nov 2022 14:40)  T(F): 98.4 (02 Nov 2022 10:26), Max: 99.4 (01 Nov 2022 14:40)  HR: 96 (02 Nov 2022 10:26) (64 - 96)  BP: 108/48 (02 Nov 2022 10:26) (94/43 - 116/49)  BP(mean): --  RR: 18 (02 Nov 2022 10:26) (18 - 18)  SpO2: 100% (02 Nov 2022 10:26) (97% - 100%)    Parameters below as of 02 Nov 2022 10:26  Patient On (Oxygen Delivery Method): room air        PHYSICAL EXAM:    General: Elderly male resting in a chair   HEENT: NC/AT; PERRL, anicteric sclera; MMM  Neck: supple  Cardiovascular: +S1/S2; RRR  Respiratory: CTA B/L; no W/R/R  Gastrointestinal: soft, NT/ND; +BSx4. Midline wound vac   Extremities: WWP; no edema, clubbing or cyanosis  Vascular: 2+ radial, DP/PT pulses B/L  Neurological: AAOx3; no focal deficits    MEDICATIONS:  MEDICATIONS  (STANDING):  acetaminophen     Tablet .. 650 milliGRAM(s) Oral every 6 hours  aMIOdarone    Tablet 200 milliGRAM(s) Oral daily  DULoxetine 60 milliGRAM(s) Oral daily  furosemide    Tablet 20 milliGRAM(s) Oral daily  gabapentin 100 milliGRAM(s) Oral three times a day  influenza  Vaccine (HIGH DOSE) 0.7 milliLiter(s) IntraMuscular once  levothyroxine 25 MICROGram(s) Oral daily  metoprolol succinate ER 50 milliGRAM(s) Oral daily  pancrelipase  (CREON 24,000 Lipase Units) 3 Capsule(s) Oral three times a day with meals  pantoprazole    Tablet 40 milliGRAM(s) Oral before breakfast  rivaroxaban 15 milliGRAM(s) Oral two times a day  sacubitril 49 mG/valsartan 51 mG 1 Tablet(s) Oral two times a day  tamsulosin 0.4 milliGRAM(s) Oral at bedtime    MEDICATIONS  (PRN):  artificial tears (preservative free) Ophthalmic Solution 1 Drop(s) Both EYES every 2 hours PRN Dry Eyes  diphenhydrAMINE 25 milliGRAM(s) Oral every 6 hours PRN Rash and/or Itching  melatonin 3 milliGRAM(s) Oral at bedtime PRN Insomnia  metoclopramide Injectable 10 milliGRAM(s) IV Push every 4 hours PRN Nausea and/or Vomiting  ondansetron Injectable 4 milliGRAM(s) IV Push every 6 hours PRN Nausea and/or Vomiting      ALLERGIES:  Allergies    No Known Allergies    Intolerances        LABS:                        8.4    3.46  )-----------( 134      ( 01 Nov 2022 06:56 )             26.4     11-01    136  |  102  |  12  ----------------------------<  132<H>  3.8   |  23  |  0.57    Ca    8.1<L>      01 Nov 2022 06:56  Phos  3.6     11-01  Mg     1.60     11-01          CAPILLARY BLOOD GLUCOSE          RADIOLOGY & ADDITIONAL TESTS: Reviewed.    ASSESSMENT:    PLAN:

## 2022-11-02 NOTE — CONSULT NOTE ADULT - ASSESSMENT
There are no active cardiac conditions. There is no evidence of ACS, heart failure, or obstructive valvular heart disease. The patient may safely proceed with the planned procedure, including the use of sedation or GET as clinically indicated.  No further cardiac testing is required.    Given the patient’s reduced ejection fraction, would advise judicious use of fluids and agents that depress myocardial function (eg propofol).

## 2022-11-02 NOTE — PROGRESS NOTE ADULT - SUBJECTIVE AND OBJECTIVE BOX
Plastic Surgery Progress Note (pg [facility]: [id number], [facility]: [phone])    SUBJECTIVE  The patient was seen and examined. Yesterday patient complained of abdominal pain and nausea. AXR performed, not concerning. Patient feels better this morning.     OBJECTIVE  ___________________________________________________  VITAL SIGNS / I&O's   Vital Signs Last 24 Hrs  T(C): 37 (02 Nov 2022 06:07), Max: 37.4 (01 Nov 2022 14:40)  T(F): 98.6 (02 Nov 2022 06:07), Max: 99.4 (01 Nov 2022 14:40)  HR: 71 (02 Nov 2022 06:07) (64 - 87)  BP: 111/47 (02 Nov 2022 06:07) (94/43 - 117/56)  BP(mean): --  RR: 18 (02 Nov 2022 06:07) (18 - 18)  SpO2: 99% (02 Nov 2022 06:07) (96% - 100%)    Parameters below as of 02 Nov 2022 06:07  Patient On (Oxygen Delivery Method): room air    01 Nov 2022 07:01  -  02 Nov 2022 07:00  --------------------------------------------------------  IN:  Total IN: 0 mL    OUT:    VAC (Vacuum Assisted Closure) System (mL): 5 mL    Voided (mL): 750 mL  Total OUT: 755 mL    Total NET: -755 mL    ___________________________________________________  PHYSICAL EXAM    -- CONSTITUTIONAL: NAD, lying in bed  -- NEURO: Awake, alert  -- PULM: Non-labored respirations  -- ABDOMEN: soft, nontender, nondistended.   VAC holding suction adequately.     ___________________________________________________  LABS                        8.4    3.46  )-----------( 134      ( 01 Nov 2022 06:56 )             26.4     01 Nov 2022 06:56    136    |  102    |  12     ----------------------------<  132    3.8     |  23     |  0.57     Ca    8.1        01 Nov 2022 06:56  Phos  3.6       01 Nov 2022 06:56  Mg     1.60      01 Nov 2022 06:56    ___________________________________________________  MEDICATIONS  (STANDING):  acetaminophen     Tablet .. 650 milliGRAM(s) Oral every 6 hours  aMIOdarone    Tablet 200 milliGRAM(s) Oral daily  DULoxetine 60 milliGRAM(s) Oral daily  furosemide    Tablet 20 milliGRAM(s) Oral daily  gabapentin 100 milliGRAM(s) Oral three times a day  influenza  Vaccine (HIGH DOSE) 0.7 milliLiter(s) IntraMuscular once  levothyroxine 25 MICROGram(s) Oral daily  metoprolol succinate ER 50 milliGRAM(s) Oral daily  pancrelipase  (CREON 24,000 Lipase Units) 3 Capsule(s) Oral three times a day with meals  pantoprazole    Tablet 40 milliGRAM(s) Oral before breakfast  rivaroxaban 15 milliGRAM(s) Oral two times a day  sacubitril 49 mG/valsartan 51 mG 1 Tablet(s) Oral two times a day  tamsulosin 0.4 milliGRAM(s) Oral at bedtime    MEDICATIONS  (PRN):  artificial tears (preservative free) Ophthalmic Solution 1 Drop(s) Both EYES every 2 hours PRN Dry Eyes  diphenhydrAMINE 25 milliGRAM(s) Oral every 6 hours PRN Rash and/or Itching  melatonin 3 milliGRAM(s) Oral at bedtime PRN Insomnia  metoclopramide Injectable 10 milliGRAM(s) IV Push every 4 hours PRN Nausea and/or Vomiting  ondansetron Injectable 4 milliGRAM(s) IV Push every 6 hours PRN Nausea and/or Vomiting

## 2022-11-03 NOTE — ADVANCED PRACTICE NURSE CONSULT - RECOMMEDATIONS
In sunrise on the header click Tools -> Pressure injury treatment guidelines for tool to help with assessment and topical recommendations.     Please contact Wound Care Service Line if patient more amendable to full assessment and if we can be of further assistance (ext [phone]).

## 2022-11-03 NOTE — PROGRESS NOTE ADULT - SUBJECTIVE AND OBJECTIVE BOX
Hospitalist Progress Note  Mile Carpio MD Pager # [phone]    OVERNIGHT EVENTS: MEME    SUBJECTIVE / INTERVAL HPI: Patient seen and examined at bedside. Pt. reports nausea in the evenings making it difficult to sleep. He denies any vomiting. He is having bowel movements.     VITAL SIGNS:  Vital Signs Last 24 Hrs  T(C): 36.4 (03 Nov 2022 10:45), Max: 37.1 (03 Nov 2022 05:06)  T(F): 97.5 (03 Nov 2022 10:45), Max: 98.7 (03 Nov 2022 05:06)  HR: 89 (03 Nov 2022 10:45) (72 - 89)  BP: 95/55 (03 Nov 2022 10:45) (95/55 - 114/51)  BP(mean): --  RR: 17 (03 Nov 2022 10:45) (16 - 18)  SpO2: 99% (03 Nov 2022 10:45) (98% - 99%)    Parameters below as of 03 Nov 2022 10:45  Patient On (Oxygen Delivery Method): room air        PHYSICAL EXAM:    General: Elderly male sitting in a chair  HEENT: NC/AT; PERRL, anicteric sclera; MMM  Neck: supple  Cardiovascular: +S1/S2; RRR  Respiratory: CTA B/L; no W/R/R  Gastrointestinal: soft, NT/ND; +BSx4. Wound vac over the midline in place   Extremities: WWP; no edema, clubbing or cyanosis  Vascular: 2+ radial, DP/PT pulses B/L  Neurological: AAOx3; no focal deficits    MEDICATIONS:  MEDICATIONS  (STANDING):  acetaminophen     Tablet .. 650 milliGRAM(s) Oral every 6 hours  aMIOdarone    Tablet 200 milliGRAM(s) Oral daily  DULoxetine 60 milliGRAM(s) Oral daily  furosemide    Tablet 20 milliGRAM(s) Oral daily  gabapentin 100 milliGRAM(s) Oral three times a day  heparin  Infusion.  Unit(s)/Hr (16 mL/Hr) IV Continuous <Continuous>  influenza  Vaccine (HIGH DOSE) 0.7 milliLiter(s) IntraMuscular once  levothyroxine 25 MICROGram(s) Oral daily  metoprolol succinate ER 50 milliGRAM(s) Oral daily  pancrelipase  (CREON 24,000 Lipase Units) 3 Capsule(s) Oral three times a day with meals  pantoprazole    Tablet 40 milliGRAM(s) Oral before breakfast  sacubitril 49 mG/valsartan 51 mG 1 Tablet(s) Oral two times a day  tamsulosin 0.4 milliGRAM(s) Oral at bedtime    MEDICATIONS  (PRN):  artificial tears (preservative free) Ophthalmic Solution 1 Drop(s) Both EYES every 2 hours PRN Dry Eyes  diphenhydrAMINE 25 milliGRAM(s) Oral every 6 hours PRN Rash and/or Itching  heparin   Injectable 7000 Unit(s) IV Push every 6 hours PRN For aPTT less than 40  heparin   Injectable 3500 Unit(s) IV Push every 6 hours PRN For aPTT between 40 - 57  melatonin 3 milliGRAM(s) Oral at bedtime PRN Insomnia  metoclopramide Injectable 10 milliGRAM(s) IV Push every 4 hours PRN Nausea and/or Vomiting  ondansetron Injectable 4 milliGRAM(s) IV Push every 6 hours PRN Nausea and/or Vomiting      ALLERGIES:  Allergies    No Known Allergies    Intolerances        LABS:                        7.8    4.19  )-----------( 140      ( 03 Nov 2022 00:22 )             24.5           PT/INR - ( 02 Nov 2022 14:15 )   PT: 41.7 sec;   INR: 3.55 ratio         PTT - ( 03 Nov 2022 09:25 )  PTT:>200 sec    CAPILLARY BLOOD GLUCOSE          RADIOLOGY & ADDITIONAL TESTS: Reviewed.    ASSESSMENT:    PLAN:

## 2022-11-03 NOTE — PROVIDER CONTACT NOTE (CRITICAL VALUE NOTIFICATION) - RECOMMENDATIONS
possible blood admin.
stop heparin drip for 1 hour and decrease by 3 to a rate of 13
follow Heparin nomogram

## 2022-11-03 NOTE — PROGRESS NOTE ADULT - SUBJECTIVE AND OBJECTIVE BOX
Plastic Surgery Progress Note (pg [facility]: [id number], [facility]: [phone])    SUBJECTIVE  The patient was seen and examined. Patient reports nausea and discomfort overnight.     OBJECTIVE  ___________________________________________________  VITAL SIGNS / I&O's   Vital Signs Last 24 Hrs  T(C): 36.4 (03 Nov 2022 10:45), Max: 37.1 (03 Nov 2022 05:06)  T(F): 97.5 (03 Nov 2022 10:45), Max: 98.7 (03 Nov 2022 05:06)  HR: 89 (03 Nov 2022 10:45) (72 - 89)  BP: 95/55 (03 Nov 2022 10:45) (95/55 - 114/51)  BP(mean): --  RR: 17 (03 Nov 2022 10:45) (16 - 18)  SpO2: 99% (03 Nov 2022 10:45) (98% - 99%)    Parameters below as of 03 Nov 2022 10:45  Patient On (Oxygen Delivery Method): room air          02 Nov 2022 07:01  -  03 Nov 2022 07:00  --------------------------------------------------------  IN:  Total IN: 0 mL    OUT:    VAC (Vacuum Assisted Closure) System (mL): 75 mL  Total OUT: 75 mL    Total NET: -75 mL      03 Nov 2022 07:01  -  03 Nov 2022 13:23  --------------------------------------------------------  IN:  Total IN: 0 mL    OUT:    VAC (Vacuum Assisted Closure) System (mL): 50 mL  Total OUT: 50 mL    Total NET: -50 mL        ___________________________________________________  PHYSICAL EXAM  -- CONSTITUTIONAL: NAD, lying in bed  -- NEURO: Awake, alert  -- PULM: Non-labored respirations  -- ABDOMEN: soft, nontender, nondistended.   VAC holding suction adequately.   ___________________________________________________  LABS                        7.8    4.19  )-----------( 140      ( 03 Nov 2022 00:22 )             24.5           PT/INR - ( 02 Nov 2022 14:15 )   PT: 41.7 sec;   INR: 3.55 ratio         PTT - ( 03 Nov 2022 09:25 )  PTT:>200 sec  ___________________________________________________  MEDICATIONS  (STANDING):  acetaminophen     Tablet .. 650 milliGRAM(s) Oral every 6 hours  aMIOdarone    Tablet 200 milliGRAM(s) Oral daily  DULoxetine 60 milliGRAM(s) Oral daily  furosemide    Tablet 20 milliGRAM(s) Oral daily  gabapentin 100 milliGRAM(s) Oral three times a day  heparin  Infusion.  Unit(s)/Hr (16 mL/Hr) IV Continuous <Continuous>  influenza  Vaccine (HIGH DOSE) 0.7 milliLiter(s) IntraMuscular once  levothyroxine 25 MICROGram(s) Oral daily  metoprolol succinate ER 50 milliGRAM(s) Oral daily  pancrelipase  (CREON 24,000 Lipase Units) 3 Capsule(s) Oral three times a day with meals  pantoprazole    Tablet 40 milliGRAM(s) Oral before breakfast  sacubitril 49 mG/valsartan 51 mG 1 Tablet(s) Oral two times a day  tamsulosin 0.4 milliGRAM(s) Oral at bedtime    MEDICATIONS  (PRN):  artificial tears (preservative free) Ophthalmic Solution 1 Drop(s) Both EYES every 2 hours PRN Dry Eyes  diphenhydrAMINE 25 milliGRAM(s) Oral every 6 hours PRN Rash and/or Itching  heparin   Injectable 7000 Unit(s) IV Push every 6 hours PRN For aPTT less than 40  heparin   Injectable 3500 Unit(s) IV Push every 6 hours PRN For aPTT between 40 - 57  melatonin 3 milliGRAM(s) Oral at bedtime PRN Insomnia  metoclopramide Injectable 10 milliGRAM(s) IV Push every 4 hours PRN Nausea and/or Vomiting  ondansetron Injectable 4 milliGRAM(s) IV Push every 6 hours PRN Nausea and/or Vomiting

## 2022-11-03 NOTE — PROVIDER CONTACT NOTE (CRITICAL VALUE NOTIFICATION) - BACKGROUND
S/P Whipple a year ago wound vac to midline abd. H/O pancreatic ca. S/P Whipple a year ago wound not healing, wound vac to midline abd. H/O pancreatic ca. CT abdomen/pelvis with findings of thrombosed portal vein now on heparin drip.

## 2022-11-03 NOTE — PROGRESS NOTE ADULT - SUBJECTIVE AND OBJECTIVE BOX
Interval Events:   Patient denies any abdominal pain, nausea /vomiting, diarrhea or melena / bloody bm.      Hospital Medications:  acetaminophen     Tablet .. 650 milliGRAM(s) Oral every 6 hours  aMIOdarone    Tablet 200 milliGRAM(s) Oral daily  artificial tears (preservative free) Ophthalmic Solution 1 Drop(s) Both EYES every 2 hours PRN  diphenhydrAMINE 25 milliGRAM(s) Oral every 6 hours PRN  DULoxetine 60 milliGRAM(s) Oral daily  furosemide    Tablet 20 milliGRAM(s) Oral daily  gabapentin 100 milliGRAM(s) Oral three times a day  heparin   Injectable 7000 Unit(s) IV Push every 6 hours PRN  heparin   Injectable 3500 Unit(s) IV Push every 6 hours PRN  heparin  Infusion.  Unit(s)/Hr IV Continuous <Continuous>  influenza  Vaccine (HIGH DOSE) 0.7 milliLiter(s) IntraMuscular once  levothyroxine 25 MICROGram(s) Oral daily  melatonin 3 milliGRAM(s) Oral at bedtime PRN  metoclopramide Injectable 10 milliGRAM(s) IV Push every 4 hours PRN  metoprolol succinate ER 50 milliGRAM(s) Oral daily  ondansetron Injectable 4 milliGRAM(s) IV Push every 6 hours PRN  pancrelipase  (CREON 24,000 Lipase Units) 3 Capsule(s) Oral three times a day with meals  pantoprazole    Tablet 40 milliGRAM(s) Oral before breakfast  sacubitril 49 mG/valsartan 51 mG 1 Tablet(s) Oral two times a day  tamsulosin 0.4 milliGRAM(s) Oral at bedtime      ROS: All system reviewed and negative except as mentioned above.    PHYSICAL EXAM:   Vital Signs:  Vital Signs Last 24 Hrs  T(C): 37.1 (2022 05:06), Max: 37.1 (2022 05:06)  T(F): 98.7 (2022 05:06), Max: 98.7 (2022 05:06)  HR: 79 (2022 05:06) (72 - 96)  BP: 111/51 (2022 05:06) (90/45 - 114/51)  BP(mean): --  RR: 16 (2022 05:06) (16 - 18)  SpO2: 98% (2022 05:06) (98% - 100%)    Parameters below as of 2022 05:06  Patient On (Oxygen Delivery Method): room air      Daily     Daily Weight in k (2022 15:28)    General: WDWN male resting in bed   HEENT: NC/AT; PERRL, anicteric sclera; MMM  Neck: supple  Cardiovascular: +S1/S2; RRR  Respiratory: CTA B/L; no W/R/R  Gastrointestinal: soft, NT/ND; +BSx4. Midline wound vac in place.   Extremities: WWP; no edema, clubbing or cyanosis  Vascular: 2+ radial, DP/PT pulses B/L  Neurological: AAOx3; no focal deficits      LABS:                        7.8    4.19  )-----------( 140      ( 2022 00:22 )             24.5     Mean Cell Volume: 92.1 fL (22 @ 00:22)            PT/INR - ( 2022 14:15 )   PT: 41.7 sec;   INR: 3.55 ratio         PTT - ( 2022 00:22 )  PTT:>200 sec                            7.8    4.19  )-----------( 140      ( 2022 00:22 )             24.5                         8.4    3.46  )-----------( 134      ( 2022 06:56 )             26.4       Imaging: Images reviewed.

## 2022-11-03 NOTE — PROGRESS NOTE ADULT - ASSESSMENT
80 yo M w/ HTN, HLD, HFrEF (EF 30-35%), VT, s/p ACID, AS s/p TAVR, CAD s/p stents, GERD, hypothyroid, anxiety, pancreatic ca s/p whipple 10/26/21, chemo and xrt with non-healing abdominal wound treated with recent admission/discharge on 9/29  p/w bleeding from abdominal incision site with CT abd shows- New portal venous thrombosis involving the main, right, and left portal veins. The portal splenic confluence and central splenic vein are not well visualized and may be thrombosed. Status post Whipple procedure. Question ill-defined soft tissue encasing and obliterating the proximal main portal vein and portal splenic confluence, and encasing the hepatic artery, raising the possibility of locally recurrent disease. IR initially consulted but no percutaneous window for Biopsy. [facility] called for evaluation for possible EUS w/ biopsy given concern for recurrence.     #Hx of pancreatic adenocarcinoma s/p whipple, chemo and radiation  #Non-healing abdominal wound  #Acute portal vein thrombosis   #Questionable ill-defined soft tissue encasing/obliterating portal vein and hepatic artery    Recommendations:  -Discussed with Hem/Onc and although they are not considering chemotherapy (due to poor wound healing at this time) they would assess for check-point inh marker for possible immunotherapy.  -Plan for EUS tomorrow since patient been off Rivaroxaban for at least 48 hrs  -Please hold Heparin drip after midnight  -NPO after midnight  -Cardiology recs appreciated      Recommendations preliminary until signed by attending.     Shay Crespo MD  Gastroenterology/Hepatology Fellow  1st option: [phone] (text or call), ONLY available from 7:00 am to 5:00 pm.   **Contact on-call GI fellow via answering service ([phone]) from 5pm-7am AND on weekends/holidays**  2nd option: Available via [employer] Teams  3rd option: Pager: [phone]

## 2022-11-03 NOTE — PROGRESS NOTE ADULT - ASSESSMENT
ASSESSMENT:  This is a 82 yo M PMH HTN, HLD, HFrEF s/p ACID, AS s/p TAVR, CAD s/p stents, pancreatic ca s/p chemo and radiation therapy s/p whipple on 10/26/21 with resulting non-healing abdominal wound treated with repeat STSG and vac (7/8/22, 9/29/22) both with poor take, now treated with local wound care. Presented this admission for bleeding of the abdominal wound - resolved.   CT abdomen pelvis performed on this admission with concerning findings of thrombosed portal vein and new ascites, New wedge-shaped geographic regions of low attenuation throughout the liver, likely perfusional abnormalities related to new portal venous. Question ill-defined soft tissue encasing and obliterating the proximal main portal vein and portal splenic confluence, and encasing the hepatic artery, raising the possibility of locally recurrent disease. Tumor markers also increased.   IR consulted and unable to perform tissue biopsy. [facility] consulted and able to perform EUS to biopsy if pt is a candidate for further therapy and the biopsy will .     Plan  - Wound progressing appropriately and showing signs of good healing. Plan for weekly vac changes.   - Remainder of care and oncologic workup per medicine and heme/onc  - Plan discussed with PRS attending, Dr. Gallegos.     Caitlyn Hutchins MD  Plastic Surgery Resident PGY3  [facility]: [phone]  [facility]: [id number]

## 2022-11-03 NOTE — ADVANCED PRACTICE NURSE CONSULT - REASON FOR CONSULT
Patient seen on skin care rounds after wound care referral received for assessment of skin impairment and recommendations of topical management of sacrum stage 1. At this time, patient is refusing assessment. Education provided on importance of full skin assessment and pressure injuries. Patient and son in law at bedside understands education utilizing teachback, but is still refusing. As per patient, "I can never get comfortable, and I finally just did. If I'm moved again, I'll never be comfortable again."  Wishes respected at this time.

## 2022-11-04 NOTE — PROGRESS NOTE ADULT - ASSESSMENT
ASSESSMENT:  This is a 80 yo M PMH HTN, HLD, HFrEF s/p ACID, AS s/p TAVR, CAD s/p stents, pancreatic ca s/p chemo and radiation therapy s/p whipple on 10/26/21 with resulting non-healing abdominal wound treated with repeat STSG and vac (7/8/22, 9/29/22) both with poor take, now treated with local wound care. Presented this admission for bleeding of the abdominal wound - resolved.   CT abdomen pelvis performed on this admission with concerning findings of thrombosed portal vein and new ascites, New wedge-shaped geographic regions of low attenuation throughout the liver, likely perfusional abnormalities related to new portal venous. Question ill-defined soft tissue encasing and obliterating the proximal main portal vein and portal splenic confluence, and encasing the hepatic artery, raising the possibility of locally recurrent disease. Tumor markers also increased.   IR consulted and unable to perform tissue biopsy. [facility] consulted and able to perform EUS to biopsy if pt is a candidate for further therapy and the biopsy will .     Plan  - Wound progressing appropriately and showing signs of good healing. Plan for weekly vac changes.   - Remainder of care and oncologic workup per medicine and heme/onc  - Plan discussed with PRS attending, Dr. Gallegos.     Caitlyn Hutchins MD  Plastic Surgery Resident PGY3  [facility]: [phone]  [facility]: [id number]

## 2022-11-04 NOTE — PROGRESS NOTE ADULT - SUBJECTIVE AND OBJECTIVE BOX
Interval Events:   NO acute event  INR > 3, unable to proceed with EUS today    Hospital Medications:  acetaminophen     Tablet .. 650 milliGRAM(s) Oral every 6 hours  aMIOdarone    Tablet 200 milliGRAM(s) Oral daily  artificial tears (preservative free) Ophthalmic Solution 1 Drop(s) Both EYES every 2 hours PRN  diphenhydrAMINE 25 milliGRAM(s) Oral every 6 hours PRN  DULoxetine 60 milliGRAM(s) Oral daily  gabapentin 100 milliGRAM(s) Oral three times a day  influenza  Vaccine (HIGH DOSE) 0.7 milliLiter(s) IntraMuscular once  levothyroxine 25 MICROGram(s) Oral daily  melatonin 3 milliGRAM(s) Oral at bedtime PRN  metoclopramide Injectable 10 milliGRAM(s) IV Push every 4 hours PRN  metoprolol succinate ER 50 milliGRAM(s) Oral daily  ondansetron Injectable 4 milliGRAM(s) IV Push every 6 hours PRN  pancrelipase  (CREON 24,000 Lipase Units) 3 Capsule(s) Oral three times a day with meals  pantoprazole    Tablet 40 milliGRAM(s) Oral before breakfast  sacubitril 49 mG/valsartan 51 mG 1 Tablet(s) Oral two times a day  tamsulosin 0.4 milliGRAM(s) Oral at bedtime      ROS: All system reviewed and negative except as mentioned above.    PHYSICAL EXAM:   Vital Signs:  Vital Signs Last 24 Hrs  T(C): 36.7 (04 Nov 2022 05:01), Max: 36.9 (03 Nov 2022 14:16)  T(F): 98.1 (04 Nov 2022 05:01), Max: 98.4 (03 Nov 2022 14:16)  HR: 84 (04 Nov 2022 05:01) (78 - 89)  BP: 101/56 (04 Nov 2022 05:01) (95/55 - 108/56)  BP(mean): --  RR: 16 (04 Nov 2022 05:01) (16 - 17)  SpO2: 98% (04 Nov 2022 05:01) (98% - 99%)    Parameters below as of 04 Nov 2022 05:01  Patient On (Oxygen Delivery Method): room air      Daily Height in cm: 185.4 (04 Nov 2022 01:44)    Daily     General: WDWN male resting in bed   HEENT: NC/AT; PERRL, anicteric sclera; MMM  Neck: supple  Cardiovascular: +S1/S2; RRR  Respiratory: CTA B/L; no W/R/R  Gastrointestinal: soft, NT/ND; +BSx4. Midline wound vac in place.   Extremities: WWP; no edema, clubbing or cyanosis  Vascular: 2+ radial, DP/PT pulses B/L  Neurological: AAOx3; no focal deficits    LABS:                        8.1    3.95  )-----------( 153      ( 04 Nov 2022 03:36 )             25.5     Mean Cell Volume: 93.1 fL (11-04-22 @ 03:36)    11-04    134<L>  |  100  |  17  ----------------------------<  142<H>  4.4   |  23  |  0.66    Ca    8.7      04 Nov 2022 03:36  Phos  3.1     11-04  Mg     1.70     11-04    TPro  6.7  /  Alb  3.2<L>  /  TBili  0.6  /  DBili  x   /  AST  18  /  ALT  14  /  AlkPhos  110  11-04    LIVER FUNCTIONS - ( 04 Nov 2022 03:36 )  Alb: 3.2 g/dL / Pro: 6.7 g/dL / ALK PHOS: 110 U/L / ALT: 14 U/L / AST: 18 U/L / GGT: x           PT/INR - ( 04 Nov 2022 03:36 )   PT: 40.3 sec;   INR: 3.43 ratio         PTT - ( 04 Nov 2022 03:36 )  PTT:42.2 sec                            8.1    3.95  )-----------( 153      ( 04 Nov 2022 03:36 )             25.5                         7.8    4.19  )-----------( 140      ( 03 Nov 2022 00:22 )             24.5       Imaging: Images reviewed.

## 2022-11-04 NOTE — PROGRESS NOTE ADULT - SUBJECTIVE AND OBJECTIVE BOX
Plastic Surgery Progress Note (pg [facility]: [id number], [facility]: [phone])    SUBJECTIVE  The patient was seen and examined. No acute events overnight. Plan for EUS today.     OBJECTIVE  ___________________________________________________  VITAL SIGNS / I&O's   Vital Signs Last 24 Hrs  T(C): 36.9 (04 Nov 2022 09:52), Max: 36.9 (03 Nov 2022 14:16)  T(F): 98.4 (04 Nov 2022 09:52), Max: 98.4 (03 Nov 2022 14:16)  HR: 97 (04 Nov 2022 09:52) (78 - 97)  BP: 102/49 (04 Nov 2022 09:52) (95/55 - 108/56)  BP(mean): --  RR: 17 (04 Nov 2022 09:52) (16 - 17)  SpO2: 98% (04 Nov 2022 09:52) (98% - 99%)    Parameters below as of 04 Nov 2022 09:52  Patient On (Oxygen Delivery Method): room air          03 Nov 2022 07:01  -  04 Nov 2022 07:00  --------------------------------------------------------  IN:  Total IN: 0 mL    OUT:    Stool (mL): 1 mL    VAC (Vacuum Assisted Closure) System (mL): 50 mL    Voided (mL): 550 mL  Total OUT: 601 mL    Total NET: -601 mL        ___________________________________________________  PHYSICAL EXAM  -- CONSTITUTIONAL: NAD, lying in bed  -- NEURO: Awake, alert  -- PULM: Non-labored respirations  -- ABDOMEN: soft, nontender, nondistended.   VAC holding suction adequately.   ___________________________________________________  LABS                        8.1    3.95  )-----------( 153      ( 04 Nov 2022 03:36 )             25.5     04 Nov 2022 03:36    134    |  100    |  17     ----------------------------<  142    4.4     |  23     |  0.66     Ca    8.7        04 Nov 2022 03:36  Phos  3.1       04 Nov 2022 03:36  Mg     1.70      04 Nov 2022 03:36    TPro  6.7    /  Alb  3.2    /  TBili  0.6    /  DBili  x      /  AST  18     /  ALT  14     /  AlkPhos  110    04 Nov 2022 03:36    PT/INR - ( 04 Nov 2022 03:36 )   PT: 40.3 sec;   INR: 3.43 ratio         PTT - ( 04 Nov 2022 03:36 )  PTT:42.2 sec  ___________________________________________________  MEDICATIONS  (STANDING):  acetaminophen     Tablet .. 650 milliGRAM(s) Oral every 6 hours  aMIOdarone    Tablet 200 milliGRAM(s) Oral daily  DULoxetine 60 milliGRAM(s) Oral daily  gabapentin 100 milliGRAM(s) Oral three times a day  influenza  Vaccine (HIGH DOSE) 0.7 milliLiter(s) IntraMuscular once  levothyroxine 25 MICROGram(s) Oral daily  metoprolol succinate ER 50 milliGRAM(s) Oral daily  pancrelipase  (CREON 24,000 Lipase Units) 3 Capsule(s) Oral three times a day with meals  pantoprazole    Tablet 40 milliGRAM(s) Oral before breakfast  phytonadione  IVPB 5 milliGRAM(s) IV Intermittent once  sacubitril 49 mG/valsartan 51 mG 1 Tablet(s) Oral two times a day  tamsulosin 0.4 milliGRAM(s) Oral at bedtime    MEDICATIONS  (PRN):  artificial tears (preservative free) Ophthalmic Solution 1 Drop(s) Both EYES every 2 hours PRN Dry Eyes  diphenhydrAMINE 25 milliGRAM(s) Oral every 6 hours PRN Rash and/or Itching  melatonin 3 milliGRAM(s) Oral at bedtime PRN Insomnia  metoclopramide Injectable 10 milliGRAM(s) IV Push every 4 hours PRN Nausea and/or Vomiting  ondansetron Injectable 4 milliGRAM(s) IV Push every 6 hours PRN Nausea and/or Vomiting

## 2022-11-04 NOTE — PROGRESS NOTE ADULT - SUBJECTIVE AND OBJECTIVE BOX
INTERVAL HPI/OVERNIGHT EVENTS:  Patient seen at bedside.  reports nausea and uncontrolled pain    VITAL SIGNS:  T(F): 98.5 (11-04-22 @ 13:49)  HR: 90 (11-04-22 @ 13:49)  BP: 93/51 (11-04-22 @ 13:49)  RR: 17 (11-04-22 @ 13:49)  SpO2: 100% (11-04-22 @ 13:49)  Wt(kg): --    PHYSICAL EXAM:    Constitutional: NAD, resting in bed comfortably  Eyes: EOMI, sclera non-icteric  Neck: supple, no LAP  Respiratory: CTA b/l, good air entry b/l, no wheezing, rhonchi or crackels  Cardiovascular: RRR, normal S1S2, no M/R/G  Gastrointestinal: soft, NTND  Extremities: no edema  Neurological: AAOx3, non focal  Skin: Normal temperature    MEDICATIONS  (STANDING):  acetaminophen     Tablet .. 650 milliGRAM(s) Oral every 6 hours  aMIOdarone    Tablet 200 milliGRAM(s) Oral daily  DULoxetine 60 milliGRAM(s) Oral daily  gabapentin 100 milliGRAM(s) Oral three times a day  influenza  Vaccine (HIGH DOSE) 0.7 milliLiter(s) IntraMuscular once  levothyroxine 25 MICROGram(s) Oral daily  metoprolol succinate ER 50 milliGRAM(s) Oral daily  pancrelipase  (CREON 24,000 Lipase Units) 3 Capsule(s) Oral three times a day with meals  pantoprazole    Tablet 40 milliGRAM(s) Oral before breakfast  sacubitril 49 mG/valsartan 51 mG 1 Tablet(s) Oral two times a day  tamsulosin 0.4 milliGRAM(s) Oral at bedtime    MEDICATIONS  (PRN):  artificial tears (preservative free) Ophthalmic Solution 1 Drop(s) Both EYES every 2 hours PRN Dry Eyes  diphenhydrAMINE 25 milliGRAM(s) Oral every 6 hours PRN Rash and/or Itching  melatonin 3 milliGRAM(s) Oral at bedtime PRN Insomnia  metoclopramide Injectable 10 milliGRAM(s) IV Push every 4 hours PRN Nausea and/or Vomiting  ondansetron Injectable 8 milliGRAM(s) IV Push every 8 hours PRN Nausea and/or Vomiting  simethicone 80 milliGRAM(s) Chew three times a day PRN Gas      Allergies    No Known Allergies    Intolerances        LABS:                        8.1    3.95  )-----------( 153      ( 04 Nov 2022 03:36 )             25.5     11-04    134<L>  |  100  |  17  ----------------------------<  142<H>  4.4   |  23  |  0.66    Ca    8.7      04 Nov 2022 03:36  Phos  3.1     11-04  Mg     1.70     11-04    TPro  6.7  /  Alb  3.2<L>  /  TBili  0.6  /  DBili  x   /  AST  18  /  ALT  14  /  AlkPhos  110  11-04    PT/INR - ( 04 Nov 2022 03:36 )   PT: 40.3 sec;   INR: 3.43 ratio         PTT - ( 04 Nov 2022 03:36 )  PTT:42.2 sec      RADIOLOGY & ADDITIONAL TESTS:  Studies reviewed.

## 2022-11-04 NOTE — PROGRESS NOTE ADULT - SUBJECTIVE AND OBJECTIVE BOX
Hospitalist Progress Note  Mile Carpio MD Pager # [phone]    OVERNIGHT EVENTS: MEME    SUBJECTIVE / INTERVAL HPI: Patient seen and examined at bedside. Pt. reports ongoing nausea overnight. He has BMs. No other aches/pains. Remainder of ROS negative.     VITAL SIGNS:  Vital Signs Last 24 Hrs  T(C): 36.9 (04 Nov 2022 09:52), Max: 36.9 (03 Nov 2022 14:16)  T(F): 98.4 (04 Nov 2022 09:52), Max: 98.4 (03 Nov 2022 14:16)  HR: 97 (04 Nov 2022 09:52) (78 - 97)  BP: 102/49 (04 Nov 2022 09:52) (101/56 - 108/56)  BP(mean): --  RR: 17 (04 Nov 2022 09:52) (16 - 17)  SpO2: 98% (04 Nov 2022 09:52) (98% - 99%)    Parameters below as of 04 Nov 2022 09:52  Patient On (Oxygen Delivery Method): room air        PHYSICAL EXAM:    General: Elderly male resting in bed   HEENT: NC/AT; PERRL, anicteric sclera; MMM  Neck: supple  Cardiovascular: +S1/S2; RRR  Respiratory: CTA B/L; no W/R/R  Gastrointestinal: soft, NT/ND; +BSx4 - wound vac in place  Extremities: WWP; no edema, clubbing or cyanosis  Vascular: 2+ radial, DP/PT pulses B/L  Neurological: AAOx3; no focal deficits    MEDICATIONS:  MEDICATIONS  (STANDING):  acetaminophen     Tablet .. 650 milliGRAM(s) Oral every 6 hours  aMIOdarone    Tablet 200 milliGRAM(s) Oral daily  DULoxetine 60 milliGRAM(s) Oral daily  gabapentin 100 milliGRAM(s) Oral three times a day  influenza  Vaccine (HIGH DOSE) 0.7 milliLiter(s) IntraMuscular once  levothyroxine 25 MICROGram(s) Oral daily  LORazepam   Injectable 0.25 milliGRAM(s) IV Push once  metoprolol succinate ER 50 milliGRAM(s) Oral daily  pancrelipase  (CREON 24,000 Lipase Units) 3 Capsule(s) Oral three times a day with meals  pantoprazole    Tablet 40 milliGRAM(s) Oral before breakfast  sacubitril 49 mG/valsartan 51 mG 1 Tablet(s) Oral two times a day  tamsulosin 0.4 milliGRAM(s) Oral at bedtime    MEDICATIONS  (PRN):  artificial tears (preservative free) Ophthalmic Solution 1 Drop(s) Both EYES every 2 hours PRN Dry Eyes  diphenhydrAMINE 25 milliGRAM(s) Oral every 6 hours PRN Rash and/or Itching  melatonin 3 milliGRAM(s) Oral at bedtime PRN Insomnia  metoclopramide Injectable 10 milliGRAM(s) IV Push every 4 hours PRN Nausea and/or Vomiting  ondansetron Injectable 8 milliGRAM(s) IV Push every 8 hours PRN Nausea and/or Vomiting  simethicone 80 milliGRAM(s) Chew three times a day PRN Gas      ALLERGIES:  Allergies    No Known Allergies    Intolerances        LABS:                        8.1    3.95  )-----------( 153      ( 04 Nov 2022 03:36 )             25.5     11-04    134<L>  |  100  |  17  ----------------------------<  142<H>  4.4   |  23  |  0.66    Ca    8.7      04 Nov 2022 03:36  Phos  3.1     11-04  Mg     1.70     11-04    TPro  6.7  /  Alb  3.2<L>  /  TBili  0.6  /  DBili  x   /  AST  18  /  ALT  14  /  AlkPhos  110  11-04    PT/INR - ( 04 Nov 2022 03:36 )   PT: 40.3 sec;   INR: 3.43 ratio         PTT - ( 04 Nov 2022 03:36 )  PTT:42.2 sec    CAPILLARY BLOOD GLUCOSE          RADIOLOGY & ADDITIONAL TESTS: Reviewed.    ASSESSMENT:    PLAN:

## 2022-11-04 NOTE — PROGRESS NOTE ADULT - ASSESSMENT
81m with h/p pancreatic cancer s/p neoadjuvant gem/abraxane x 5 cycles completed in 9/21, s/p whipple operation 10/26/21. Pathology revealed pancreatic adenocarcinoma, 0/31LN, positive pancreatic resection margin requiring adjuvant RT (SBRT 4000 cGy in 5 fxs) completed in 1/19/22. Post op course c/b non healing abdominal wound, presenting for wound bleeding.     CT a/p 10/26:   New portal venous thrombosis involving the main, right, and left portal   veins. The portal splenic confluence and central splenic vein are not   well visualized and may be thrombosed.  Status post Whipple procedure. Question ill-defined soft tissue encasing   and obliterating the proximal main portal vein and portal splenic   confluence, and encasing the hepatic artery, raising the possibility of   locally recurrent disease.  New wedge-shaped geographic regions of low attenuation throughout the   liver, likely perfusional abnormalities related to new portal venous   thrombosis and possibly developing infarcts, with neoplasm thought to be   less likely.  New small volume ascites. New moderate bilateral pleural effusions.  Small focus of gas within the urinary bladder. Correlate for recent   instrumentation/catheterization.    CT results discussed with patient, his son at bedside and his daughter over the phone.     -CT A/p with concern for progression of disease given rising tumor markers, CT findings and rising signatera.   -Not amenable to tissue biopsy by IR  -Tissue is needed to guide the next steps in management. Will need molecular testing and PDL1 on the biopsy specimen.   -[facility] input apprecaited, plan was bx today but postponed 2/2 high INR  Family meeting held with Doug at bedside and Pippa over the phoen. Over 1hour spent with family and patient. We went over the recommendations. Next steps in management will be dictated by the bx results. All questions and concerns addressed.  -Palliative care consult for symptom management  -Holistic nurse consult  -Patient with new PVT, as per patient,  has been off  AC (for h/o CAD) for more than 1 month given abdominal wound complications. Would resume full dose AC if and when cleared by plastic surgery.   -Supportive care, pain control, Nutrition, PT, DVT ppx  -Outpatient oncology f/u    Will follow. Please do not hesitate to call back with questions.     Raquel Vallecillo MD  Medical Oncology Attending  C: [phone]     time spent on direct patient care, interdisciplinary discussions and chart review.

## 2022-11-04 NOTE — PROGRESS NOTE ADULT - ASSESSMENT
82 yo M w/ HTN, HLD, HFrEF (EF 30-35%), VT, s/p ACID, AS s/p TAVR, CAD s/p stents, GERD, hypothyroid, anxiety, pancreatic ca s/p whipple 10/26/21, chemo and xrt with non-healing abdominal wound treated with recent admission/discharge on 9/29  p/w bleeding from abdominal incision site with CT abd shows- New portal venous thrombosis involving the main, right, and left portal veins. The portal splenic confluence and central splenic vein are not well visualized and may be thrombosed. Status post Whipple procedure. Question ill-defined soft tissue encasing and obliterating the proximal main portal vein and portal splenic confluence, and encasing the hepatic artery, raising the possibility of locally recurrent disease. IR initially consulted but no percutaneous window for Biopsy. [facility] called for evaluation for possible EUS w/ biopsy given concern for recurrence.     #Hx of pancreatic adenocarcinoma s/p whipple, chemo and radiation  #Non-healing abdominal wound  #Acute portal vein thrombosis   #Questionable ill-defined soft tissue encasing/obliterating portal vein and hepatic artery    Recommendations:  -Discussed with Hem/Onc and although they are not considering chemotherapy (due to poor wound healing at this time) they would assess for check-point inh marker for possible immunotherapy.  -Plan for EUS on Monday assuming INR < 2  -Please hold Heparin drip after midnight on Sunday  -NPO after midnight on Sunday   -Cardiology recs appreciated      Recommendations preliminary until signed by attending.     Shay Crespo MD  Gastroenterology/Hepatology Fellow  1st option: [phone] (text or call), ONLY available from 7:00 am to 5:00 pm.   **Contact on-call GI fellow via answering service ([phone]) from 5pm-7am AND on weekends/holidays**  2nd option: Available via Microsoft Teams  3rd option: Pager: [phone]

## 2022-11-05 NOTE — PROGRESS NOTE ADULT - ASSESSMENT
ASSESSMENT:  This is a 82 yo M PMH HTN, HLD, HFrEF s/p ACID, AS s/p TAVR, CAD s/p stents, pancreatic ca s/p chemo and radiation therapy s/p whipple on 10/26/21 with resulting non-healing abdominal wound treated with repeat STSG and vac (7/8/22, 9/29/22) both with poor take, now treated with local wound care. Presented this admission for bleeding of the abdominal wound - resolved.   CT abdomen pelvis performed on this admission with concerning findings of thrombosed portal vein and new ascites, New wedge-shaped geographic regions of low attenuation throughout the liver, likely perfusional abnormalities related to new portal venous. Question ill-defined soft tissue encasing and obliterating the proximal main portal vein and portal splenic confluence, and encasing the hepatic artery, raising the possibility of locally recurrent disease. Tumor markers also increased.   IR consulted and unable to perform tissue biopsy. [facility] consulted and able to perform EUS to biopsy if pt is a candidate for further therapy and the biopsy will .     Plan  - Wound progressing appropriately and showing signs of good healing. Plan for weekly vac changes.   - Next wound vac change on Monday  - Remainder of care and oncologic workup per medicine and heme/onc  - Plan discussed with PRS attending, Dr. Gallegos.

## 2022-11-05 NOTE — PROGRESS NOTE ADULT - SUBJECTIVE AND OBJECTIVE BOX
[facility] Division of Hospital Medicine  Aria Paredes MD  Pager [phone]      Patient is a 81y old  Male who presents with a chief complaint of Postoperative wound bleed (05 Nov 2022 08:25)      SUBJECTIVE / OVERNIGHT EVENTS:    pt found to have mild drop of hgb today, no abigail bleeding noted. pt offers no new complaints     ADDITIONAL REVIEW OF SYSTEMS:    RESPIRATORY: No cough, wheezing, chills or hemoptysis; No shortness of breath  CARDIOVASCULAR: No chest pain, palpitations, dizziness, or leg swelling  GASTROINTESTINAL: No abdominal or epigastric pain. No nausea, vomiting, or hematemesis; No diarrhea or constipation. No melena or hematochezia.      MEDICATIONS  (STANDING):  acetaminophen     Tablet .. 650 milliGRAM(s) Oral every 6 hours  ALPRAZolam 0.25 milliGRAM(s) Oral at bedtime  aMIOdarone    Tablet 200 milliGRAM(s) Oral daily  DULoxetine 60 milliGRAM(s) Oral daily  gabapentin 100 milliGRAM(s) Oral three times a day  influenza  Vaccine (HIGH DOSE) 0.7 milliLiter(s) IntraMuscular once  levothyroxine 25 MICROGram(s) Oral daily  metoprolol succinate ER 50 milliGRAM(s) Oral daily  pancrelipase  (CREON 24,000 Lipase Units) 3 Capsule(s) Oral three times a day with meals  pantoprazole    Tablet 40 milliGRAM(s) Oral before breakfast  sacubitril 49 mG/valsartan 51 mG 1 Tablet(s) Oral two times a day  tamsulosin 0.4 milliGRAM(s) Oral at bedtime    MEDICATIONS  (PRN):  artificial tears (preservative free) Ophthalmic Solution 1 Drop(s) Both EYES every 2 hours PRN Dry Eyes  diphenhydrAMINE 25 milliGRAM(s) Oral every 6 hours PRN Rash and/or Itching  melatonin 3 milliGRAM(s) Oral at bedtime PRN Insomnia  metoclopramide Injectable 10 milliGRAM(s) IV Push every 4 hours PRN Nausea and/or Vomiting  ondansetron Injectable 8 milliGRAM(s) IV Push every 8 hours PRN Nausea and/or Vomiting  simethicone 80 milliGRAM(s) Chew three times a day PRN Gas      CAPILLARY BLOOD GLUCOSE        I&O's Summary    04 Nov 2022 07:01  -  05 Nov 2022 07:00  --------------------------------------------------------  IN: 0 mL / OUT: 1000 mL / NET: -1000 mL    05 Nov 2022 08:01  -  05 Nov 2022 15:42  --------------------------------------------------------  IN: 0 mL / OUT: 1 mL / NET: -1 mL        PHYSICAL EXAM:  Vital Signs Last 24 Hrs  T(C): 36.6 (05 Nov 2022 10:26), Max: 37.1 (04 Nov 2022 22:03)  T(F): 97.9 (05 Nov 2022 10:26), Max: 98.7 (04 Nov 2022 22:03)  HR: 99 (05 Nov 2022 10:26) (86 - 99)  BP: 113/61 (05 Nov 2022 10:26) (90/45 - 113/61)  BP(mean): --  RR: 17 (05 Nov 2022 10:26) (17 - 18)  SpO2: 100% (05 Nov 2022 10:26) (95% - 100%)    Parameters below as of 05 Nov 2022 10:26  Patient On (Oxygen Delivery Method): room air        CONSTITUTIONAL: NAD,  EYES: PERRLA; conjunctiva and sclera clear  ENMT: Moist oral mucosa, no pharyngeal injection or exudates;   NECK: Supple, no palpable masses;  RESPIRATORY: Normal respiratory effort; lungs are clear to auscultation bilaterally  CARDIOVASCULAR: Regular rate and rhythm, normal S1 and S2, no murmur/rub/gallop; No lower extremity edema; Peripheral pulses are 2+ bilaterally  ABDOMEN: Nontender to palpation, normoactive bowel sounds, no rebound/guarding;   MUSCLOSKELETAL:   no clubbing or cyanosis of digits; no joint swelling or tenderness to palpation  PSYCH: A+O to person, place, and time; affect appropriate  NEUROLOGY: CN 2-12 are intact and symmetric; no gross sensory deficits;   SKIN: abd wound vac in place     LABS:                        7.3    3.82  )-----------( 177      ( 05 Nov 2022 10:45 )             23.7     11-05    133<L>  |  101  |  20  ----------------------------<  142<H>  4.6   |  23  |  0.67    Ca    8.7      05 Nov 2022 05:54  Phos  3.4     11-05  Mg     1.90     11-05    TPro  5.9<L>  /  Alb  2.8<L>  /  TBili  0.5  /  DBili  x   /  AST  18  /  ALT  13  /  AlkPhos  95  11-05    PT/INR - ( 05 Nov 2022 05:54 )   PT: 26.0 sec;   INR: 2.22 ratio         PTT - ( 05 Nov 2022 05:54 )  PTT:35.6 sec            RADIOLOGY & ADDITIONAL TESTS:  Results Reviewed:   Imaging Personally Reviewed:  Electrocardiogram Personally Reviewed:    COORDINATION OF CARE:  Care Discussed with Consultants/Other Providers [Y/N]:  Prior or Outpatient Records Reviewed [Y/N]:

## 2022-11-05 NOTE — CHART NOTE - NSCHARTNOTEFT_GEN_A_CORE
Notified hmg is 6.7. Discussed with the patient . Discussed with attn informed to repeat.   Repeat 7.3. Discussed with  will hold off transfusion.

## 2022-11-05 NOTE — PROGRESS NOTE ADULT - SUBJECTIVE AND OBJECTIVE BOX
· Subjective and Objective:   Plastic Surgery Progress Note (pg [facility]: [id number], NS: [phone])    SUBJECTIVE  The patient was seen and examined. No acute events overnight.     OBJECTIVE  ___________________________________________________  VITAL SIGNS / I&O's   Vital Signs Last 24 Hrs  T(C): 36.6 (05 Nov 2022 06:03), Max: 37.1 (04 Nov 2022 22:03)  T(F): 97.9 (05 Nov 2022 06:03), Max: 98.7 (04 Nov 2022 22:03)  HR: 88 (05 Nov 2022 06:03) (86 - 97)  BP: 108/46 (05 Nov 2022 06:03) (90/45 - 111/52)  BP(mean): --  RR: 18 (05 Nov 2022 06:03) (17 - 18)  SpO2: 100% (05 Nov 2022 06:03) (95% - 100%)    Parameters below as of 05 Nov 2022 06:03  Patient On (Oxygen Delivery Method): room air    I&O's Detail    04 Nov 2022 07:01  -  05 Nov 2022 07:00  --------------------------------------------------------  IN:  Total IN: 0 mL    OUT:    VAC (Vacuum Assisted Closure) System (mL): 200 mL    Voided (mL): 800 mL  Total OUT: 1000 mL    Total NET: -1000 mL    ___________________________________________________  PHYSICAL EXAM  -- CONSTITUTIONAL: NAD, lying in bed  -- NEURO: Awake, alert  -- PULM: Non-labored respirations  -- ABDOMEN: soft, nontender, nondistended.   VAC holding suction adequately.   ___________________________________________________  LABS                        6.7    3.45  )-----------( 159      ( 05 Nov 2022 05:54 )             20.9     11-05    133<L>  |  101  |  20  ----------------------------<  142<H>  4.6   |  23  |  0.67    Ca    8.7      05 Nov 2022 05:54  Phos  3.4     11-05  Mg     1.90     11-05    TPro  5.9<L>  /  Alb  2.8<L>  /  TBili  0.5  /  DBili  x   /  AST  18  /  ALT  13  /  AlkPhos  95  11-05    PT/INR - ( 05 Nov 2022 05:54 )   PT: 26.0 sec;   INR: 2.22 ratio         PTT - ( 05 Nov 2022 05:54 )  PTT:35.6 sec             ___________________________________________________  MEDICATIONS  (STANDING):  acetaminophen     Tablet .. 650 milliGRAM(s) Oral every 6 hours  ALPRAZolam 0.25 milliGRAM(s) Oral at bedtime  aMIOdarone    Tablet 200 milliGRAM(s) Oral daily  DULoxetine 60 milliGRAM(s) Oral daily  gabapentin 100 milliGRAM(s) Oral three times a day  influenza  Vaccine (HIGH DOSE) 0.7 milliLiter(s) IntraMuscular once  levothyroxine 25 MICROGram(s) Oral daily  metoprolol succinate ER 50 milliGRAM(s) Oral daily  pancrelipase  (CREON 24,000 Lipase Units) 3 Capsule(s) Oral three times a day with meals  pantoprazole    Tablet 40 milliGRAM(s) Oral before breakfast  sacubitril 49 mG/valsartan 51 mG 1 Tablet(s) Oral two times a day  tamsulosin 0.4 milliGRAM(s) Oral at bedtime    MEDICATIONS  (PRN):  artificial tears (preservative free) Ophthalmic Solution 1 Drop(s) Both EYES every 2 hours PRN Dry Eyes  diphenhydrAMINE 25 milliGRAM(s) Oral every 6 hours PRN Rash and/or Itching  melatonin 3 milliGRAM(s) Oral at bedtime PRN Insomnia  metoclopramide Injectable 10 milliGRAM(s) IV Push every 4 hours PRN Nausea and/or Vomiting  ondansetron Injectable 8 milliGRAM(s) IV Push every 8 hours PRN Nausea and/or Vomiting  simethicone 80 milliGRAM(s) Chew three times a day PRN Gas

## 2022-11-05 NOTE — PROVIDER CONTACT NOTE (CRITICAL VALUE NOTIFICATION) - SITUATION
Ptt greater than 200
H/H 6.7/ 20.9
Aptt >200
pt had abd wound vac placed due to nonhealing HBOT and STSG.

## 2022-11-06 NOTE — PROGRESS NOTE ADULT - SUBJECTIVE AND OBJECTIVE BOX
[facility] Division of Hospital Medicine  Aria Paredes MD  Pager [phone]      Patient is a 81y old  Male who presents with a chief complaint of Postoperative wound bleed (06 Nov 2022 08:24)      SUBJECTIVE / OVERNIGHT EVENTS:    no acute event o/n. BP soft today. all BP meds held.  no new complaints     ADDITIONAL REVIEW OF SYSTEMS:    RESPIRATORY: No cough, wheezing, chills or hemoptysis; No shortness of breath  CARDIOVASCULAR: No chest pain, palpitations, dizziness, or leg swelling  GASTROINTESTINAL: No abdominal or epigastric pain. No nausea, vomiting, or hematemesis; No diarrhea or constipation. No melena or hematochezia.      MEDICATIONS  (STANDING):  acetaminophen     Tablet .. 650 milliGRAM(s) Oral every 6 hours  ALPRAZolam 0.25 milliGRAM(s) Oral at bedtime  aMIOdarone    Tablet 200 milliGRAM(s) Oral daily  DULoxetine 60 milliGRAM(s) Oral daily  gabapentin 100 milliGRAM(s) Oral three times a day  influenza  Vaccine (HIGH DOSE) 0.7 milliLiter(s) IntraMuscular once  levothyroxine 25 MICROGram(s) Oral daily  metoprolol succinate ER 50 milliGRAM(s) Oral daily  pancrelipase  (CREON 24,000 Lipase Units) 3 Capsule(s) Oral three times a day with meals  pantoprazole    Tablet 40 milliGRAM(s) Oral before breakfast  sacubitril 49 mG/valsartan 51 mG 1 Tablet(s) Oral two times a day  tamsulosin 0.4 milliGRAM(s) Oral at bedtime    MEDICATIONS  (PRN):  artificial tears (preservative free) Ophthalmic Solution 1 Drop(s) Both EYES every 2 hours PRN Dry Eyes  diphenhydrAMINE 25 milliGRAM(s) Oral every 6 hours PRN Rash and/or Itching  melatonin 3 milliGRAM(s) Oral at bedtime PRN Insomnia  metoclopramide Injectable 10 milliGRAM(s) IV Push every 4 hours PRN Nausea and/or Vomiting  ondansetron Injectable 8 milliGRAM(s) IV Push every 8 hours PRN Nausea and/or Vomiting  simethicone 80 milliGRAM(s) Chew three times a day PRN Gas      CAPILLARY BLOOD GLUCOSE        I&O's Summary    05 Nov 2022 08:01  -  06 Nov 2022 07:00  --------------------------------------------------------  IN: 0 mL / OUT: 401 mL / NET: -401 mL    06 Nov 2022 07:01  -  06 Nov 2022 14:42  --------------------------------------------------------  IN: 0 mL / OUT: 5 mL / NET: -5 mL        PHYSICAL EXAM:  Vital Signs Last 24 Hrs  T(C): 36.3 (06 Nov 2022 13:18), Max: 37.1 (06 Nov 2022 10:00)  T(F): 97.4 (06 Nov 2022 13:18), Max: 98.7 (06 Nov 2022 10:00)  HR: 84 (06 Nov 2022 13:18) (84 - 98)  BP: 109/48 (06 Nov 2022 13:18) (101/47 - 115/56)  BP(mean): --  RR: 18 (06 Nov 2022 13:18) (17 - 18)  SpO2: 100% (06 Nov 2022 13:18) (96% - 100%)    Parameters below as of 06 Nov 2022 13:18  Patient On (Oxygen Delivery Method): room air        CONSTITUTIONAL: NAD,  EYES: PERRLA; conjunctiva and sclera clear  ENMT: Moist oral mucosa, no pharyngeal injection or exudates;   NECK: Supple, no palpable masses;  RESPIRATORY: Normal respiratory effort; lungs are clear to auscultation bilaterally  CARDIOVASCULAR: Regular rate and rhythm, normal S1 and S2, no murmur/rub/gallop; No lower extremity edema; Peripheral pulses are 2+ bilaterally  ABDOMEN: Nontender to palpation, normoactive bowel sounds, no rebound/guarding;   MUSCLOSKELETAL:   no clubbing or cyanosis of digits; no joint swelling or tenderness to palpation  PSYCH: A+O to person, place, and time; affect appropriate  NEUROLOGY: CN 2-12 are intact and symmetric; no gross sensory deficits;   SKIN: abd wound vac in place     LABS:                        7.6    4.35  )-----------( 213      ( 06 Nov 2022 10:26 )             24.3     11-06    133<L>  |  99  |  16  ----------------------------<  139<H>  4.2   |  22  |  0.61    Ca    9.0      06 Nov 2022 10:26  Phos  3.5     11-06  Mg     1.70     11-06    TPro  5.9<L>  /  Alb  2.8<L>  /  TBili  0.5  /  DBili  x   /  AST  18  /  ALT  13  /  AlkPhos  95  11-05    PT/INR - ( 06 Nov 2022 10:26 )   PT: 18.0 sec;   INR: 1.54 ratio         PTT - ( 05 Nov 2022 05:54 )  PTT:35.6 sec            RADIOLOGY & ADDITIONAL TESTS:  Results Reviewed:   Imaging Personally Reviewed:  Electrocardiogram Personally Reviewed:    COORDINATION OF CARE:  Care Discussed with Consultants/Other Providers [Y/N]:  Prior or Outpatient Records Reviewed [Y/N]:

## 2022-11-06 NOTE — CHART NOTE - NSCHARTNOTEFT_GEN_A_CORE
INR appears to be slowly downtrending and 2.2 yesterday. Will plan for EUS on Monday assuming INR < 2.     Recommendations:  -NPO after midnight on Sunday for  EUS on Monday assuming INR < 2  -Please hold Heparin drip after midnight on Sunday  -Please recheck C-19 swab   -Please ensure morning labs are drawn at 2am, electrolytes repleted as necessary, and Hg>7    Josh Barrios, PGY-4  Gastroenterology/Hepatology Fellow  Available on Microsoft Teams   [phone] (Long Range Pager)  [phone] (Short Range Pager [facility])    After 5pm, please contact the on-call GI fellow. [phone]

## 2022-11-06 NOTE — PROGRESS NOTE ADULT - ASSESSMENT
ASSESSMENT:  This is a 82 yo M with complex abdominal wound, being managed with vac/local wound care    Plan  - Wound progressing appropriately and showing signs of good healing. Plan for weekly vac changes.   - Next wound vac change on Monday  - Remainder of care and oncologic workup per medicine and heme/onc  - Plan discussed with Dr. Gallegos

## 2022-11-06 NOTE — PROGRESS NOTE ADULT - SUBJECTIVE AND OBJECTIVE BOX
· Subjective and Objective:   Plastic Surgery Progress Note (pg [facility]: [id number], NS: [phone])    SUBJECTIVE  The patient was seen and examined. No acute events overnight.     OBJECTIVE  ___________________________________________________  PHYSICAL EXAM  -- CONSTITUTIONAL: NAD, lying in bed  -- NEURO: Awake, alert  -- PULM: Non-labored respirations  -- ABDOMEN: soft, nontender, nondistended.   VAC holding suction adequately.     Vital Signs Last 24 Hrs  T(C): 36.4 (06 Nov 2022 06:07), Max: 36.6 (05 Nov 2022 10:26)  T(F): 97.6 (06 Nov 2022 06:07), Max: 97.9 (05 Nov 2022 10:26)  HR: 86 (06 Nov 2022 06:07) (85 - 99)  BP: 104/42 (06 Nov 2022 06:07) (101/47 - 114/48)  BP(mean): --  RR: 18 (06 Nov 2022 06:07) (17 - 18)  SpO2: 97% (06 Nov 2022 06:07) (96% - 100%)    Parameters below as of 06 Nov 2022 06:07  Patient On (Oxygen Delivery Method): room air        I&O's Detail    05 Nov 2022 08:01  -  06 Nov 2022 07:00  --------------------------------------------------------  IN:  Total IN: 0 mL    OUT:    Stool (mL): 1 mL    Voided (mL): 400 mL  Total OUT: 401 mL    Total NET: -401 mL      MEDICATIONS  (STANDING):  acetaminophen     Tablet .. 650 milliGRAM(s) Oral every 6 hours  ALPRAZolam 0.25 milliGRAM(s) Oral at bedtime  aMIOdarone    Tablet 200 milliGRAM(s) Oral daily  DULoxetine 60 milliGRAM(s) Oral daily  gabapentin 100 milliGRAM(s) Oral three times a day  influenza  Vaccine (HIGH DOSE) 0.7 milliLiter(s) IntraMuscular once  levothyroxine 25 MICROGram(s) Oral daily  metoprolol succinate ER 50 milliGRAM(s) Oral daily  pancrelipase  (CREON 24,000 Lipase Units) 3 Capsule(s) Oral three times a day with meals  pantoprazole    Tablet 40 milliGRAM(s) Oral before breakfast  sacubitril 49 mG/valsartan 51 mG 1 Tablet(s) Oral two times a day  tamsulosin 0.4 milliGRAM(s) Oral at bedtime    MEDICATIONS  (PRN):  artificial tears (preservative free) Ophthalmic Solution 1 Drop(s) Both EYES every 2 hours PRN Dry Eyes  diphenhydrAMINE 25 milliGRAM(s) Oral every 6 hours PRN Rash and/or Itching  melatonin 3 milliGRAM(s) Oral at bedtime PRN Insomnia  metoclopramide Injectable 10 milliGRAM(s) IV Push every 4 hours PRN Nausea and/or Vomiting  ondansetron Injectable 8 milliGRAM(s) IV Push every 8 hours PRN Nausea and/or Vomiting  simethicone 80 milliGRAM(s) Chew three times a day PRN Gas      LABS:                        7.3    3.82  )-----------( 177      ( 05 Nov 2022 10:45 )             23.7     11-05    133<L>  |  101  |  20  ----------------------------<  142<H>  4.6   |  23  |  0.67    Ca    8.7      05 Nov 2022 05:54  Phos  3.4     11-05  Mg     1.90     11-05    TPro  5.9<L>  /  Alb  2.8<L>  /  TBili  0.5  /  DBili  x   /  AST  18  /  ALT  13  /  AlkPhos  95  11-05    PT/INR - ( 05 Nov 2022 05:54 )   PT: 26.0 sec;   INR: 2.22 ratio         PTT - ( 05 Nov 2022 05:54 )  PTT:35.6 sec  LIVER FUNCTIONS - ( 05 Nov 2022 05:54 )  Alb: 2.8 g/dL / Pro: 5.9 g/dL / ALK PHOS: 95 U/L / ALT: 13 U/L / AST: 18 U/L / GGT: x             ABO Interpretation: A (11-05-22 @ 09:31)

## 2022-11-07 NOTE — PROGRESS NOTE ADULT - ASSESSMENT
ASSESSMENT:  This is a 80 yo M with complex abdominal wound, being managed with vac/local wound care    Plan  - Vac changed today (11/7) with acell/adaptic  - Wound bed healing appropriately  - Remainder of care and oncologic workup per medicine and heme/onc  - Plan discussed with Dr. Gallegos

## 2022-11-07 NOTE — CONSULT NOTE ADULT - SUBJECTIVE AND OBJECTIVE BOX
HPI:  82 yo M w/ HTN, HLD, HFrEF (EF 30-35%), VT, s/p ACID, AS s/p TAVR, CAD s/p stents, GERD, hypothyroid, anxiety, pancreatic ca s/p whipple 10/26/21, chemo and xrt with non-healing abdominal wound treated w/ HBOT and STSG 7/8/22, s/p repeat STSG wound vac placement on 9/29 resulting in poor take at discharge, p/w bleeding from abdominal incision site. pt endorses bleeding 1d prior, resolved today. denies abd pain. pt also endorses bleeding from L thigh graft site, although appears hemostatic in the ED. denies fevers, chills, n/v, diarrhea, dysuria. (24 Oct 2022 16:39)    PERTINENT PM/SXH:   HTN (hypertension)    HLD (hyperlipidemia)    GERD (gastroesophageal reflux disease)    Cardiomyopathy    MSSA bacteremia    Acute osteomyelitis    Pulmonary embolism    Pancreas cancer    [place] (hard of hearing)    Other complications of procedures, not elsewhere classified, subsequent encounter    History of total hip replacement    History of laminectomy    ICD (implantable cardiac defibrillator) in place    Benign testicular tumor    History of hip replacement    Status post amputation of toe of right foot    Status post fracture of femur    S/P TAVR (transcatheter aortic valve replacement)    H/O Whipple procedure    FAMILY HISTORY:  Family history of stroke (Mother)    Family Hx substance abuse [ ]yes [ ]no  ITEMS NOT CHECKED ARE NOT PRESENT    SOCIAL HISTORY:   Significant other/partner[ ]  Children[x ]  Church/Spirituality: Hinduism  Substance hx:  [ ]   Tobacco hx:  [ ]   Alcohol hx: [ ]   Home Opioid hx:  [ ] I-Stop Reference No:  Living Situation: [ ]Home  [ ]Long term care  [ x]Rehab [ ]Other    ADVANCE DIRECTIVES:    DNR/MOLST  [ ]  Living Will  [ ]   DECISION MAKER(s):  [ ] Health Care Proxy(s)  [x ] Surrogate(s)  [ ] Guardian           Name(s): Phone Number(s):  Adult children  Main contact daughter - Stacie Hogan #[phone]    BASELINE (I)ADL(s) (prior to admission):  [facility]: [ ]Total  [x ] Moderate [ ]Dependent    Allergies    No Known Allergies    Intolerances    MEDICATIONS  (STANDING):  acetaminophen     Tablet .. 650 milliGRAM(s) Oral every 6 hours  ALPRAZolam 0.25 milliGRAM(s) Oral at bedtime  aMIOdarone    Tablet 200 milliGRAM(s) Oral daily  DULoxetine 60 milliGRAM(s) Oral daily  gabapentin 100 milliGRAM(s) Oral three times a day  influenza  Vaccine (HIGH DOSE) 0.7 milliLiter(s) IntraMuscular once  levothyroxine 25 MICROGram(s) Oral daily  metoprolol succinate ER 50 milliGRAM(s) Oral daily  pancrelipase  (CREON 24,000 Lipase Units) 3 Capsule(s) Oral three times a day with meals  pantoprazole    Tablet 40 milliGRAM(s) Oral before breakfast  sacubitril 49 mG/valsartan 51 mG 1 Tablet(s) Oral two times a day  tamsulosin 0.4 milliGRAM(s) Oral at bedtime    MEDICATIONS  (PRN):  artificial tears (preservative free) Ophthalmic Solution 1 Drop(s) Both EYES every 2 hours PRN Dry Eyes  diphenhydrAMINE 25 milliGRAM(s) Oral every 6 hours PRN Rash and/or Itching  melatonin 3 milliGRAM(s) Oral at bedtime PRN Insomnia  metoclopramide Injectable 10 milliGRAM(s) IV Push every 4 hours PRN Nausea and/or Vomiting  ondansetron Injectable 8 milliGRAM(s) IV Push every 8 hours PRN Nausea and/or Vomiting  oxyCODONE    IR 5 milliGRAM(s) Oral every 4 hours PRN Moderate Pain (4 - 6)  simethicone 80 milliGRAM(s) Chew three times a day PRN Gas    PRESENT SYMPTOMS: [ ]Unable to self-report  [ ] CPOT [ ] PAINADs [ ] RDOS  Source if other than patient:  [ ]Family   [ ]Team     Pain: [x ]yes [ ]no  QOL impact - difficulty moving  Location - mid abdomen               Aggravating factors - moving  Quality - aching  Radiation - none  Timing- intermittent  Severity (0-10 scale): 6-7/10  Minimal acceptable level (0-10 scale):     CPOT:    https://www.[facility].org/getattachment/rmu18p80-9p9x-8q0n-3r6e-3892r9004d1o/Critical-Care-Pain-Observation-Tool-(CPOT)    PAIN AD Score:   http://geriatrictoolkit.[place].[email or web address]/cog/painad.pdf (press ctrl +  left click to view)    Dyspnea:                           [ ]Mild [ ]Moderate [ ]Severe      RDOS:  0 to 2  minimal or no respiratory distress   3  mild distress  4 to 6 moderate distress  >7 severe distress  https://[email or web address].[email or web address]/handouts/hen/Respiratory_Distress_Observation_Scale.pdf (Ctrl +  left click to view)     Anxiety:                             [ ]Mild [ ]Moderate [ ]Severe  Fatigue:                             [ ]Mild [ ]Moderate [ ]Severe  Nausea:                             [ ]Mild [ ]Moderate [x ]Severe  Loss of appetite:              [ ]Mild [ ]Moderate [ ]Severe  Constipation:                    [ ]Mild [ ]Moderate [ ]Severe    PCSSQ[Palliative Care Spiritual Screening Question]   Severity (0-10):  Score of 4 or > indicate consideration of Chaplaincy referral.    Chaplaincy Referral: [ ] yes [ ] refused [ ] following    Other Symptoms:  [x ]All other review of systems negative     Palliative Performance Status Version 2: 40-50 %    http://[email or web address].org/files/news/palliative_performance_scale_[email or web address].pdf    PHYSICAL EXAM:  Vital Signs Last 24 Hrs  T(C): 36.6 (07 Nov 2022 16:03), Max: 36.8 (07 Nov 2022 09:15)  T(F): 97.8 (07 Nov 2022 16:03), Max: 98.2 (07 Nov 2022 09:15)  HR: 84 (07 Nov 2022 16:03) (84 - 99)  BP: 130/65 (07 Nov 2022 16:03) (98/54 - 134/41)  BP(mean): --  RR: 19 (07 Nov 2022 16:03) (17 - 19)  SpO2: 99% (07 Nov 2022 16:03) (96% - 100%)    Parameters below as of 07 Nov 2022 16:03  Patient On (Oxygen Delivery Method): room air    GENERAL: [ ]Cachexia    [x ]Alert  [x ]Oriented x 3  [ ]Lethargic  [ ]Unarousable  [x ]Verbal  [ ]Non-Verbal  Behavioral:   [ ] Anxiety  [ ] Delirium [ ] Agitation [ ] Other  HEENT:  [x ]Normal   [ ]Dry mouth   [ ]ET Tube/Trach  [ ]Oral lesions  PULMONARY:   [x ]Clear [ ]Tachypnea  [ ]Audible excessive secretions   [ ]Rhonchi        [ ]Right [ ]Left [ ]Bilateral  [ ]Crackles        [ ]Right [ ]Left [ ]Bilateral  [ ]Wheezing     [ ]Right [ ]Left [ ]Bilateral  [ ]Diminished breath sounds [ ]right [ ]left [ ]bilateral  CARDIOVASCULAR:    [x ]Regular [ ]Irregular [ ]Tachy  [ ]Levon [ ]Murmur [ ]Other  GASTROINTESTINAL: Midline abdominal wound with wound vac in place  [ ]Soft  [ ]Distended   [ ]+BS  [ ]Non tender [x ]Tender  [ ]Other [ ]PEG [ ]OGT/ NGT  Last BM: 11/7  GENITOURINARY:  [ x]Normal [ ] Incontinent   [ ]Oliguria/Anuria   [ ]Jimenez  MUSCULOSKELETAL:   [ ]Normal   [ ]Weakness  [ ]Bed/Wheelchair bound [ ]Edema  NEUROLOGIC:   [x ]No focal deficits  [ ]Cognitive impairment  [ ]Dysphagia [ ]Dysarthria [ ]Paresis [ ]Other   SKIN: midline abdominal wound  [ ]Normal  [ ]Rash  [ ]Other  [ ]Pressure ulcer(s)       Present on admission [ ]y [ ]n    CRITICAL CARE:  [ ] Shock Present  [ ]Septic [ ]Cardiogenic [ ]Neurologic [ ]Hypovolemic  [ ]  Vasopressors [ ]  Inotropes   [ ]Respiratory failure present [ ]Mechanical ventilation [ ]Non-invasive ventilatory support [ ]High flow    [ ]Acute  [ ]Chronic [ ]Hypoxic  [ ]Hypercarbic [ ]Other  [ ]Other organ failure     LABS:                        8.5    4.46  )-----------( 190      ( 07 Nov 2022 13:15 )             26.2     11-07    135  |  101  |  17  ----------------------------<  154<H>  4.1   |  22  |  0.63    Ca    8.8      07 Nov 2022 05:50  Phos  3.3     11-07  Mg     1.70     11-07    PT/INR - ( 07 Nov 2022 13:15 )   PT: 16.2 sec;   INR: 1.39 ratio      PTT - ( 07 Nov 2022 13:15 )  PTT:31.2 sec    RADIOLOGY & ADDITIONAL STUDIES:  < from: CT Abdomen and Pelvis w/ IV Cont (10.26.22 @ 12:26) >  IMPRESSION:  New portal venous thrombosis involving the main, right, and left portal   veins. The portal splenic confluence and central splenic vein are not   well visualized and may be thrombosed.  Status post Whipple procedure. Question ill-defined soft tissue encasing   and obliterating the proximal main portal vein and portal splenic   confluence, and encasing the hepatic artery, raising the possibility of   locally recurrent disease.  New wedge-shaped geographic regions of low attenuation throughout the   liver, likely perfusional abnormalities related to new portal venous   thrombosis and possibly developing infarcts, with neoplasm thought to be   less likely.  New small volume ascites. New moderate bilateral pleural effusions.  Small focus of gas within the urinary bladder. Correlate for recent   instrumentation/catheterization.    PROTEIN CALORIE MALNUTRITION PRESENT: [ ]mild [ ]moderate [ ]severe [ ]underweight [ ]morbid obesity  https://www.andeal.org/vault/2440/web/files/ONC/Table_Clinical%20Characteristics%20to%20Document%20Malnutrition-White%20JV%20et%20al%202012.pdf    Height (cm): 185.4 (11-10-22 @ 08:53), 182.9 (09-29-22 @ 13:34), 182.9 (09-15-22 @ 16:17)  Weight (kg): 86 (11-10-22 @ 08:53), 94.3 (09-29-22 @ 13:34), 94.3 (09-15-22 @ 15:09)  BMI (kg/m2): 25 (11-10-22 @ 08:53), 28.2 (09-29-22 @ 13:34), 28.2 (09-15-22 @ 16:17)    [ ]PPSV2 < or = to 30% [ ]significant weight loss  [ ]poor nutritional intake  [ ]anasarca[ ]Artificial Nutrition      Other REFERRALS:  [ ]Hospice  [ ]Child Life  [ ]Social Work  [ ]Case management [ ]Holistic Therapy     Goals of Care Document:

## 2022-11-07 NOTE — CONSULT NOTE ADULT - PROBLEM SELECTOR RECOMMENDATION 3
H/o pancreatic cancer s/p neoadjuvant gem/abraxane x 5 cycles completed in 9/21  s/p whipple operation 10/26/21  Requiring adjuvant RT (SBRT 4000 cGy in 5 fxs) completed in 1/19/22.   Post op course c/b non healing abdominal wound, presenting for wound bleeding.     CT A/p with concern for progression of disease given rising tumor markers  Pending bx for further recs regarding DMT
none

## 2022-11-07 NOTE — CONSULT NOTE ADULT - PROBLEM SELECTOR RECOMMENDATION 9
Uncontrolled  Pt with QTc 507  Would recommend to repeat EKG  Can use Ativan low dose 0.25mg IV q6hrs PRN for nausea/vomiting  If QTc is <500 can use atc zofran and prn reglan

## 2022-11-07 NOTE — PROGRESS NOTE ADULT - SUBJECTIVE AND OBJECTIVE BOX
Plastic Surgery Progress Note (pg [facility]: [id number], [facility]: [phone])    SUBJECTIVE  The patient was seen and examined.     OBJECTIVE  ___________________________________________________  VITAL SIGNS / I&O's   Vital Signs Last 24 Hrs  T(C): 36.4 (07 Nov 2022 13:17), Max: 36.8 (07 Nov 2022 09:15)  T(F): 97.5 (07 Nov 2022 13:17), Max: 98.2 (07 Nov 2022 09:15)  HR: 90 (07 Nov 2022 13:17) (86 - 99)  BP: 109/78 (07 Nov 2022 13:17) (98/54 - 134/41)  BP(mean): --  RR: 18 (07 Nov 2022 13:17) (17 - 18)  SpO2: 100% (07 Nov 2022 13:17) (96% - 100%)    Parameters below as of 07 Nov 2022 13:17  Patient On (Oxygen Delivery Method): room air          06 Nov 2022 07:01  -  07 Nov 2022 07:00  --------------------------------------------------------  IN:  Total IN: 0 mL    OUT:    VAC (Vacuum Assisted Closure) System (mL): 5 mL  Total OUT: 5 mL    Total NET: -5 mL      07 Nov 2022 07:01  -  07 Nov 2022 14:14  --------------------------------------------------------  IN:  Total IN: 0 mL    OUT:    Voided (mL): 210 mL  Total OUT: 210 mL    Total NET: -210 mL        ___________________________________________________  PHYSICAL EXAM    -- CONSTITUTIONAL: NAD, lying in bed  -- NEURO: Awake, alert  -- PULM: Non-labored respirations  -- ABDOMEN: soft, nontender, nondistended.   VAC holding suction adequately.       ___________________________________________________  LABS                        8.5    4.46  )-----------( 190      ( 07 Nov 2022 13:15 )             26.2     07 Nov 2022 05:50    135    |  101    |  17     ----------------------------<  154    4.1     |  22     |  0.63     Ca    8.8        07 Nov 2022 05:50  Phos  3.3       07 Nov 2022 05:50  Mg     1.70      07 Nov 2022 05:50      PT/INR - ( 07 Nov 2022 13:15 )   PT: 16.2 sec;   INR: 1.39 ratio         PTT - ( 07 Nov 2022 13:15 )  PTT:31.2 sec  CAPILLARY BLOOD GLUCOSE      ___________________________________________________  MEDICATIONS  (STANDING):  acetaminophen     Tablet .. 650 milliGRAM(s) Oral every 6 hours  ALPRAZolam 0.25 milliGRAM(s) Oral at bedtime  aMIOdarone    Tablet 200 milliGRAM(s) Oral daily  DULoxetine 60 milliGRAM(s) Oral daily  gabapentin 100 milliGRAM(s) Oral three times a day  influenza  Vaccine (HIGH DOSE) 0.7 milliLiter(s) IntraMuscular once  levothyroxine 25 MICROGram(s) Oral daily  metoprolol succinate ER 50 milliGRAM(s) Oral daily  pancrelipase  (CREON 24,000 Lipase Units) 3 Capsule(s) Oral three times a day with meals  pantoprazole    Tablet 40 milliGRAM(s) Oral before breakfast  sacubitril 49 mG/valsartan 51 mG 1 Tablet(s) Oral two times a day  tamsulosin 0.4 milliGRAM(s) Oral at bedtime    MEDICATIONS  (PRN):  artificial tears (preservative free) Ophthalmic Solution 1 Drop(s) Both EYES every 2 hours PRN Dry Eyes  diphenhydrAMINE 25 milliGRAM(s) Oral every 6 hours PRN Rash and/or Itching  melatonin 3 milliGRAM(s) Oral at bedtime PRN Insomnia  metoclopramide Injectable 10 milliGRAM(s) IV Push every 4 hours PRN Nausea and/or Vomiting  ondansetron Injectable 8 milliGRAM(s) IV Push every 8 hours PRN Nausea and/or Vomiting  oxyCODONE    IR 5 milliGRAM(s) Oral every 4 hours PRN Moderate Pain (4 - 6)  simethicone 80 milliGRAM(s) Chew three times a day PRN Gas          Assessment & Plan        Geeta Jones  Plastic & Reconstructive Surgery, PGY-1  #[id number] Plastic Surgery Progress Note (pg [facility]: [id number], [facility]: [phone])    SUBJECTIVE  The patient was seen and examined. Endorses nausea and abdominal pain. Last BM was yesterday. Denies pain.    OBJECTIVE  ___________________________________________________  VITAL SIGNS / I&O's   Vital Signs Last 24 Hrs  T(C): 36.4 (07 Nov 2022 13:17), Max: 36.8 (07 Nov 2022 09:15)  T(F): 97.5 (07 Nov 2022 13:17), Max: 98.2 (07 Nov 2022 09:15)  HR: 90 (07 Nov 2022 13:17) (86 - 99)  BP: 109/78 (07 Nov 2022 13:17) (98/54 - 134/41)  BP(mean): --  RR: 18 (07 Nov 2022 13:17) (17 - 18)  SpO2: 100% (07 Nov 2022 13:17) (96% - 100%)    Parameters below as of 07 Nov 2022 13:17  Patient On (Oxygen Delivery Method): room air          06 Nov 2022 07:01  -  07 Nov 2022 07:00  --------------------------------------------------------  IN:  Total IN: 0 mL    OUT:    VAC (Vacuum Assisted Closure) System (mL): 5 mL  Total OUT: 5 mL    Total NET: -5 mL      07 Nov 2022 07:01  -  07 Nov 2022 14:14  --------------------------------------------------------  IN:  Total IN: 0 mL    OUT:    Voided (mL): 210 mL  Total OUT: 210 mL    Total NET: -210 mL        ___________________________________________________  PHYSICAL EXAM    -- CONSTITUTIONAL: NAD, lying in bed  -- NEURO: Awake, alert  -- PULM: Non-labored respirations  -- ABDOMEN: soft, mild distention, wound bed with granulation tissue, healing appropriately  VAC holding suction adequately.       ___________________________________________________  LABS                        8.5    4.46  )-----------( 190      ( 07 Nov 2022 13:15 )             26.2     07 Nov 2022 05:50    135    |  101    |  17     ----------------------------<  154    4.1     |  22     |  0.63     Ca    8.8        07 Nov 2022 05:50  Phos  3.3       07 Nov 2022 05:50  Mg     1.70      07 Nov 2022 05:50      PT/INR - ( 07 Nov 2022 13:15 )   PT: 16.2 sec;   INR: 1.39 ratio         PTT - ( 07 Nov 2022 13:15 )  PTT:31.2 sec  CAPILLARY BLOOD GLUCOSE      ___________________________________________________  MEDICATIONS  (STANDING):  acetaminophen     Tablet .. 650 milliGRAM(s) Oral every 6 hours  ALPRAZolam 0.25 milliGRAM(s) Oral at bedtime  aMIOdarone    Tablet 200 milliGRAM(s) Oral daily  DULoxetine 60 milliGRAM(s) Oral daily  gabapentin 100 milliGRAM(s) Oral three times a day  influenza  Vaccine (HIGH DOSE) 0.7 milliLiter(s) IntraMuscular once  levothyroxine 25 MICROGram(s) Oral daily  metoprolol succinate ER 50 milliGRAM(s) Oral daily  pancrelipase  (CREON 24,000 Lipase Units) 3 Capsule(s) Oral three times a day with meals  pantoprazole    Tablet 40 milliGRAM(s) Oral before breakfast  sacubitril 49 mG/valsartan 51 mG 1 Tablet(s) Oral two times a day  tamsulosin 0.4 milliGRAM(s) Oral at bedtime    MEDICATIONS  (PRN):  artificial tears (preservative free) Ophthalmic Solution 1 Drop(s) Both EYES every 2 hours PRN Dry Eyes  diphenhydrAMINE 25 milliGRAM(s) Oral every 6 hours PRN Rash and/or Itching  melatonin 3 milliGRAM(s) Oral at bedtime PRN Insomnia  metoclopramide Injectable 10 milliGRAM(s) IV Push every 4 hours PRN Nausea and/or Vomiting  ondansetron Injectable 8 milliGRAM(s) IV Push every 8 hours PRN Nausea and/or Vomiting  oxyCODONE    IR 5 milliGRAM(s) Oral every 4 hours PRN Moderate Pain (4 - 6)  simethicone 80 milliGRAM(s) Chew three times a day PRN Gas

## 2022-11-07 NOTE — PROGRESS NOTE ADULT - SUBJECTIVE AND OBJECTIVE BOX
Completed forms faxed to [facility] fax # [phone] on 1/20/2020 Merlin Mathew, MD   Hospitalist  Pager #[phone]    PROGRESS NOTE:     Patient is a 81y old  Male who presents with a chief complaint of Postoperative wound bleed (07 Nov 2022 14:13)      SUBJECTIVE / OVERNIGHT EVENTS: NEON   Patient has abdominal pain and nausea   took anti-emetic  Has not taken any pain medication   Is havign bM     ADDITIONAL REVIEW OF SYSTEMS:    MEDICATIONS  (STANDING):  acetaminophen     Tablet .. 650 milliGRAM(s) Oral every 6 hours  ALPRAZolam 0.25 milliGRAM(s) Oral at bedtime  aMIOdarone    Tablet 200 milliGRAM(s) Oral daily  DULoxetine 60 milliGRAM(s) Oral daily  gabapentin 100 milliGRAM(s) Oral three times a day  influenza  Vaccine (HIGH DOSE) 0.7 milliLiter(s) IntraMuscular once  levothyroxine 25 MICROGram(s) Oral daily  metoprolol succinate ER 50 milliGRAM(s) Oral daily  pancrelipase  (CREON 24,000 Lipase Units) 3 Capsule(s) Oral three times a day with meals  pantoprazole    Tablet 40 milliGRAM(s) Oral before breakfast  sacubitril 49 mG/valsartan 51 mG 1 Tablet(s) Oral two times a day  tamsulosin 0.4 milliGRAM(s) Oral at bedtime    MEDICATIONS  (PRN):  artificial tears (preservative free) Ophthalmic Solution 1 Drop(s) Both EYES every 2 hours PRN Dry Eyes  diphenhydrAMINE 25 milliGRAM(s) Oral every 6 hours PRN Rash and/or Itching  melatonin 3 milliGRAM(s) Oral at bedtime PRN Insomnia  metoclopramide Injectable 10 milliGRAM(s) IV Push every 4 hours PRN Nausea and/or Vomiting  ondansetron Injectable 8 milliGRAM(s) IV Push every 8 hours PRN Nausea and/or Vomiting  oxyCODONE    IR 5 milliGRAM(s) Oral every 4 hours PRN Moderate Pain (4 - 6)  simethicone 80 milliGRAM(s) Chew three times a day PRN Gas      CAPILLARY BLOOD GLUCOSE        I&O's Summary    06 Nov 2022 07:01  -  07 Nov 2022 07:00  --------------------------------------------------------  IN: 0 mL / OUT: 5 mL / NET: -5 mL    07 Nov 2022 07:01  -  07 Nov 2022 16:27  --------------------------------------------------------  IN: 0 mL / OUT: 210 mL / NET: -210 mL        PHYSICAL EXAM:  Vital Signs Last 24 Hrs  T(C): 36.6 (07 Nov 2022 16:03), Max: 36.8 (07 Nov 2022 09:15)  T(F): 97.8 (07 Nov 2022 16:03), Max: 98.2 (07 Nov 2022 09:15)  HR: 84 (07 Nov 2022 16:03) (84 - 99)  BP: 130/65 (07 Nov 2022 16:03) (98/54 - 134/41)  BP(mean): --  RR: 19 (07 Nov 2022 16:03) (17 - 19)  SpO2: 99% (07 Nov 2022 16:03) (96% - 100%)    Parameters below as of 07 Nov 2022 16:03  Patient On (Oxygen Delivery Method): room air        CONSTITUTIONAL: NAD, well-developed male   RESPIRATORY: Normal respiratory effort; lungs are clear to auscultation bilaterally  CARDIOVASCULAR: Regular rate and rhythm, normal S1 and S2, no murmur/rub/gallop; No lower extremity edema; Peripheral pulses are 2+ bilaterally  ABDOMEN: Midline abdominal wound with wound vac in place, tender to palpation; normoactive bowel sounds, no rebound/guarding; No hepatosplenomegaly  MUSCULOSKELETAL no clubbing or cyanosis of digits; no joint swelling or tenderness to palpation  PSYCH: A+O to person, place, and time; affect appropriate    LABS:                        8.5    4.46  )-----------( 190      ( 07 Nov 2022 13:15 )             26.2     11-07    135  |  101  |  17  ----------------------------<  154<H>  4.1   |  22  |  0.63    Ca    8.8      07 Nov 2022 05:50  Phos  3.3     11-07  Mg     1.70     11-07      PT/INR - ( 07 Nov 2022 13:15 )   PT: 16.2 sec;   INR: 1.39 ratio         PTT - ( 07 Nov 2022 13:15 )  PTT:31.2 sec            RADIOLOGY & ADDITIONAL TESTS:  Results Reviewed:   Imaging Personally Reviewed:  Electrocardiogram Personally Reviewed:    COORDINATION OF CARE:  Care Discussed with Consultants/Other Providers [Y/N]:  Prior or Outpatient Records Reviewed [Y/N]:

## 2022-11-07 NOTE — CONSULT NOTE ADULT - PROVIDER SPECIALTY LIST ADULT
Heme/Onc
Internal Medicine
Plastic Surgery
Intervent Radiology
Cardiology
Gastroenterology
Palliative Care

## 2022-11-07 NOTE — CONSULT NOTE ADULT - ASSESSMENT
80 yo M w/ HTN, HLD, HFrEF (EF 30-35%), VT, s/p ACID, AS s/p TAVR, CAD s/p stents, GERD, hypothyroid, anxiety, pancreatic ca s/p whipple 10/26/21, chemo and xrt with non-healing abdominal wound treated w/ HBOT and STSG 7/8/22, s/p repeat STSG wound vac placement on 9/29 resulting in poor take at discharge, p/w bleeding from abdominal incision site. Palliative Care consulted for complex symptom management in the setting of advanced illness.

## 2022-11-07 NOTE — CONSULT NOTE ADULT - PROBLEM SELECTOR RECOMMENDATION 2
Pt indicates that Oxycodone 5mg has been helping  Short lived lasts ~3hrs  Encouraged pt to ask for pain medication whenever in pain  Will observe 24hr use and determine if pt requires long acting medication  Bowel regimen to prevent opioid induced constipation

## 2022-11-07 NOTE — CONSULT NOTE ADULT - REASON FOR ADMISSION
Postoperative wound bleed

## 2022-11-07 NOTE — CHART NOTE - NSCHARTNOTEFT_GEN_A_CORE
RD follow-up for moderate malnutrition.      80 yo M w/ HTN, HLD, HFrEF (EF 30-35%), VT, s/p ACID, AS s/p TAVR, CAD s/p stents, GERD, hypothyroid, anxiety, pancreatic ca s/p whipple 10/26/21, chemo and xrt with non-healing abdominal wound, presented with bleeding from abdominal incision site now s/p new wound vac this admission.    Patient currently NPO pending endoscopy per discussion with RN.  Patient reported prior to NPO status, PO Intake has been fair and was taking ONS.  No reported GI distress i.e. N/V/D at time of visit. No questions or concerns / preferences requested at time of visit.    Source: Patient [X]    Family [ ]     other [X] Chart, RN    Diet, NPO:   Except Medications (11-07-22 @ 04:46)    Current Weight: Weight (kg): 86 (11-04 @ 01:44)    Pertinent Medications:   acetaminophen     Tablet ..  ALPRAZolam  aMIOdarone    Tablet  DULoxetine  gabapentin  levothyroxine  metoprolol succinate ER  pancrelipase  (CREON 24,000 Lipase Units)  pantoprazole    Tablet  sacubitril 49 mG/valsartan 51 mG    Pertinent Labs:  11-07 Na135 mmol/L Glu 154 mg/dL<H> K+ 4.1 mmol/L Cr  0.63 mg/dL BUN 17 mg/dL 11-07 Phos 3.3 mg/dL 11-05 Alb 2.8 g/dL<L>    Skin: No pressure injuries, No edema noted in flowsheets; abdominal wound with wound vac    Estimated Needs:   [ ] no change since previous assessment  [X ] recalculated: Current Weight 86kg 11/4/22  Kcal 28-32kcal/gs=[id number]  Pro 1.2-1.5gm/pi=277-669wx/pro    Previous Nutrition Diagnosis: Moderate Malnutrition    Nutrition Diagnosis is [X] ongoing  [ ] resolved [ ] not applicable     Additional Recommendations:   1) Advance diet per physician recommendation following endoscopy, resume Ensure Plus High Protein twice daily (350kcal, 20gm protein / 237ml); encourage High Biological Value Protein sources (i.e. chicken, turkey, beef, fish, eggs, etc.).  2) Obtain and record current weights;  3) Please consistently document % PO intake in nursing flowsheets to assess adequacy of nutritional intake/monitor need for further nutritional intervention(s).     Ivonne Morales MS, RDN, CDN  Pager [phone]

## 2022-11-07 NOTE — CONSULT NOTE ADULT - CONSULT REQUESTED DATE/TIME
25-Oct-2022 11:55
28-Oct-2022 15:50
26-Oct-2022 14:24
01-Nov-2022 12:04
02-Nov-2022 16:02
24-Oct-2022 13:24
07-Nov-2022 06:35

## 2022-11-07 NOTE — CONSULT NOTE ADULT - PROBLEM SELECTOR RECOMMENDATION 4
Pt is full code  Surrogates are adult children as above  Goal is for bx and DMT if offered  Will continue to follow  Page for uncontrolled symptoms [phone]

## 2022-11-07 NOTE — PROCEDURE NOTE - ADDITIONAL PROCEDURE DETAILS
Magnet application recommended to deactivate the tachy therapy (shocks) during EGD procedure if cauterization use is indicated  Patient is Not pacer dependent (S-ICD has no pacing functions)

## 2022-11-08 NOTE — PROGRESS NOTE ADULT - SUBJECTIVE AND OBJECTIVE BOX
[facility] Geriatics and Palliaitve Care  Kendra Peacock, Palliative Care Nurse Practitioner  Contact Info: Page [phone] (including Night/Weekends), message on Microsoft Teams (Madonna Gardner), or leave VM at Palliative Office [phone] (non-urgent)    Indication of Geriatrics and Palliative Medicine Services: Symptom management in the setting of advanced illness     DNR on chart: No    INTERVAL EVENTS: Pt used PRN Oxycodone 5mg x2 in a 24 hr period     -------------------------------------------------------------------------------------------------------    PRESENT SYMPTOMS:     [X] No     [ ] Yes     [ ] Unable to self report        [ ] CPOT (ICU)        [ ] PAINADS       [ ] RDOS     PAIN:   If blank, patient unable to specify   Pain:  [ ]yes [ ]no  Location -                    Aggravating factors -  Quality -  Radiation -  Timing-  Pain at most severe level (0-10 scale):  Pain at minimal acceptable level (0-10 scale):     Dyspnea:                           [ ]Mild [ ]Moderate [ ]Severe  Anxiety:                             [ ]Mild [ ]Moderate [ ]Severe  Fatigue:                             [ ]Mild [ ]Moderate [ ]Severe  Nausea:                             [ ]Mild [ ]Moderate [ ]Severe  Loss of appetite:              [ ]Mild [ ]Moderate [ ]Severe  Constipation:                    [ ]Mild [ ]Moderate [ ]Severe    Other Symptoms:  [X ]All other review of systems negative     Home Medications for Symptoms if present:    I Stop Reference no:     -------------------------------------------------------------------------------------------------------    ITEMS UNCHECKED ARE NOT PRESENT    PHYSICAL:  Vital Signs Last 24 Hrs  T(C): 36.3 (08 Nov 2022 14:10), Max: 37.2 (08 Nov 2022 06:09)  T(F): 97.3 (08 Nov 2022 14:10), Max: 98.9 (08 Nov 2022 06:09)  HR: 85 (08 Nov 2022 14:10) (85 - 122)  BP: 90/44 (08 Nov 2022 14:10) (88/49 - 125/63)  BP(mean): --  RR: 17 (08 Nov 2022 14:10) (16 - 20)  SpO2: 100% (08 Nov 2022 14:10) (95% - 100%)    Parameters below as of 08 Nov 2022 14:10  Patient On (Oxygen Delivery Method): room air     I&O's Summary    07 Nov 2022 07:01  -  08 Nov 2022 07:00  --------------------------------------------------------  IN: 0 mL / OUT: 510 mL / NET: -510 mL    08 Nov 2022 07:01  -  08 Nov 2022 16:12  --------------------------------------------------------  IN: 0 mL / OUT: 0 mL / NET: 0 mL      GENERAL:  [ ] Cachexia [ ] Frail [X ]Awake [X ]Oriented x 4  [ ]Lethargic  [ ]Cachexia  [ ]Unarousable  [X ]Verbal  [ ]Non-Verbal    Behavioral:   [ ]Anxiety  [ ]Delirium [ ]Agitation [ ]Other    HEENT:  [X ]Normal   [ ]Dry mouth   [ ]ET Tube/Trach  [ ]Oral lesions    PULMONARY:   [X ]Clear [ ]Tachypnea  [ ]Audible excessive secretions   [ ]Rhonchi        [ ]Right [ ]Left [ ]Bilateral  [ ]Crackles        [ ]Right [ ]Left [ ]Bilateral  [ ]Wheezing     [ ]Right [ ]Left [ ]Bilateral  [ ]Diminished BS [ ] Right [ ]Left [ ]Bilateral    CARDIOVASCULAR:    [X ]Regular [ ]Irregular [ ]Tachy  [ ]Levon [ ]Murmur [ ]Other    GASTROINTESTINAL:  [ ]Soft  [ ]Distended   [ ]+BS  [ ]Non tender [ ]Tender  [ ]PEG [ ]OGT/ NGT   Last BM: 11/7    GENITOURINARY:  [X ]Normal [ ]Incontinent   [ ]Oliguria/Anuria   [ ]Jimenez    MUSCULOSKELETAL:   [ ]Normal   [X ]Weakness  [ ]Bed/Wheelchair bound [ ]Edema    NEUROLOGIC:   [ X]No focal deficits  [ ] Cognitive impairment  [ ] Dysphagia [ ]Dysarthria [ ] Paresis [ ]Other     SKIN:   [ ]Normal  [ ]Rash   [ ]Pressure ulcer(s) [ ]y [ ]n present on admission [X] wound    -------------------------------------------------------------------------------------------------------    LABS:                        8.0    3.68  )-----------( 184      ( 08 Nov 2022 05:47 )             24.9   11-08    134<L>  |  102  |  15  ----------------------------<  139<H>  4.3   |  24  |  0.62    Ca    8.4      08 Nov 2022 05:47  Phos  3.6     11-08  Mg     1.80     11-08    PT/INR - ( 08 Nov 2022 05:47 )   PT: 16.4 sec;   INR: 1.41 ratio         PTT - ( 08 Nov 2022 05:47 )  PTT:30.5 sec      -------------------------------------------------------------------------------------------------------  RADIOLOGY & ADDITIONAL STUDIES: < from: Xray Abdomen 1 View PORTABLE -Urgent (Xray Abdomen 1 View PORTABLE -Urgent .) (11.01.22 @ 15:39) >    Extensive spinal hardware and total left hip arthroplasty.  There are no dilated loops of small bowel.  Bowel gas and stool are identified in the within the colon and rectum.  There is no free air visualized.    IMPRESSION:    Nonobstructive bowel gas pattern.      -------------------------------------------------------------------------------------------------------  MEDICATIONS:     MEDICATIONS  (STANDING):  acetaminophen     Tablet .. 650 milliGRAM(s) Oral every 6 hours  ALPRAZolam 0.25 milliGRAM(s) Oral at bedtime  aMIOdarone    Tablet 200 milliGRAM(s) Oral daily  DULoxetine 60 milliGRAM(s) Oral daily  gabapentin 100 milliGRAM(s) Oral three times a day  heparin  Infusion. 1000 Unit(s)/Hr (10 mL/Hr) IV Continuous <Continuous>  influenza  Vaccine (HIGH DOSE) 0.7 milliLiter(s) IntraMuscular once  levothyroxine 25 MICROGram(s) Oral daily  metoprolol succinate ER 50 milliGRAM(s) Oral daily  pancrelipase  (CREON 24,000 Lipase Units) 3 Capsule(s) Oral three times a day with meals  pantoprazole    Tablet 40 milliGRAM(s) Oral before breakfast  sacubitril 49 mG/valsartan 51 mG 1 Tablet(s) Oral two times a day  tamsulosin 0.4 milliGRAM(s) Oral at bedtime    MEDICATIONS  (PRN):  artificial tears (preservative free) Ophthalmic Solution 1 Drop(s) Both EYES every 2 hours PRN Dry Eyes  diphenhydrAMINE 25 milliGRAM(s) Oral every 6 hours PRN Rash and/or Itching  heparin   Injectable 7000 Unit(s) IV Push every 6 hours PRN For aPTT less than 40  heparin   Injectable 3500 Unit(s) IV Push every 6 hours PRN For aPTT between 40 - 57  LORazepam   Injectable 0.25 milliGRAM(s) IV Push every 6 hours PRN Nausea and/or Vomiting  melatonin 3 milliGRAM(s) Oral at bedtime PRN Insomnia  oxyCODONE    IR 5 milliGRAM(s) Oral every 4 hours PRN Moderate Pain (4 - 6)  simethicone 80 milliGRAM(s) Chew three times a day PRN Gas      -------------------------------------------------------------------------------------------------------    CRITICAL CARE:  [ ]Shock Present  [ ]Septic [ ]Cardiogenic [ ]Neurologic [ ]Hypovolemic  [ ]Vasopressors [ ]Inotropes  [ ]Respiratory failure present [ ]Mechanical Ventilation [ ]Non-invasive ventilatory support [ ]High-Flow   [ ]Acute  [ ]Chronic [ ]Hypoxic  [ ]Hypercarbic [ ]Other  [ ]Other organ failure     -------------------------------------------------------------------------------------------------------  REFERRALS:   [ ]Chaplaincy  [ ]Hospice  [ ]Child Life  [ ]Social Work  [ ]Case management [ ]Holistic Therapy    [facility] Geriatics and Palliaitve Care  Kendra Peacock, Palliative Care Nurse Practitioner  Contact Info: Page [phone] (including Night/Weekends), message on Microsoft Teams (Madonna Gardner), or leave VM at Palliative Office [phone] (non-urgent)    Indication of Geriatrics and Palliative Medicine Services: Symptom management in the setting of advanced illness     DNR on chart: No    INTERVAL EVENTS: Pt used PRN Oxycodone 5mg x2 in a 24 hr period     -------------------------------------------------------------------------------------------------------    PRESENT SYMPTOMS:     [X] No     [ ] Yes     [ ] Unable to self report        [ ] CPOT (ICU)        [ ] PAINADS       [ ] RDOS     PAIN:   If blank, patient unable to specify   Pain:  [ ]yes [ ]no  Location -                    Aggravating factors -  Quality -  Radiation -  Timing-  Pain at most severe level (0-10 scale):  Pain at minimal acceptable level (0-10 scale):     Dyspnea:                           [ ]Mild [ ]Moderate [ ]Severe  Anxiety:                             [ ]Mild [ ]Moderate [ ]Severe  Fatigue:                             [ ]Mild [ ]Moderate [ ]Severe  Nausea:                             [ ]Mild [ ]Moderate [ ]Severe  Loss of appetite:              [ ]Mild [ ]Moderate [ ]Severe  Constipation:                    [ ]Mild [ ]Moderate [ ]Severe    Other Symptoms:  [X ]All other review of systems negative     Home Medications for Symptoms if present:    I Stop Reference no:     -------------------------------------------------------------------------------------------------------    ITEMS UNCHECKED ARE NOT PRESENT    PHYSICAL:  Vital Signs Last 24 Hrs  T(C): 36.3 (08 Nov 2022 14:10), Max: 37.2 (08 Nov 2022 06:09)  T(F): 97.3 (08 Nov 2022 14:10), Max: 98.9 (08 Nov 2022 06:09)  HR: 85 (08 Nov 2022 14:10) (85 - 122)  BP: 90/44 (08 Nov 2022 14:10) (88/49 - 125/63)  BP(mean): --  RR: 17 (08 Nov 2022 14:10) (16 - 20)  SpO2: 100% (08 Nov 2022 14:10) (95% - 100%)    Parameters below as of 08 Nov 2022 14:10  Patient On (Oxygen Delivery Method): room air     I&O's Summary    07 Nov 2022 07:01  -  08 Nov 2022 07:00  --------------------------------------------------------  IN: 0 mL / OUT: 510 mL / NET: -510 mL    08 Nov 2022 07:01  -  08 Nov 2022 16:12  --------------------------------------------------------  IN: 0 mL / OUT: 0 mL / NET: 0 mL      GENERAL:  [ ] Cachexia [ ] Frail [X ]Awake [X ]Oriented x 4  [ ]Lethargic  [ ]Cachexia  [ ]Unarousable  [X ]Verbal  [ ]Non-Verbal    Behavioral:   [ ]Anxiety  [ ]Delirium [ ]Agitation [ ]Other    HEENT:  [X ]Normal   [ ]Dry mouth   [ ]ET Tube/Trach  [ ]Oral lesions    PULMONARY:   [X ]Clear [ ]Tachypnea  [ ]Audible excessive secretions   [ ]Rhonchi        [ ]Right [ ]Left [ ]Bilateral  [ ]Crackles        [ ]Right [ ]Left [ ]Bilateral  [ ]Wheezing     [ ]Right [ ]Left [ ]Bilateral  [ ]Diminished BS [ ] Right [ ]Left [ ]Bilateral    CARDIOVASCULAR:    [X ]Regular [ ]Irregular [ ]Tachy  [ ]Levon [ ]Murmur [ ]Other    GASTROINTESTINAL:  [ ]Soft  [ ]Distended   [ ]+BS  [ ]Non tender [ ]Tender  [ ]PEG [ ]OGT/ NGT   Last BM: 11/7    GENITOURINARY:  [X ]Normal [ ]Incontinent   [ ]Oliguria/Anuria   [ ]Jimenez    MUSCULOSKELETAL:   [ ]Normal   [X ]Weakness  [ ]Bed/Wheelchair bound [ ]Edema    NEUROLOGIC:   [ X]No focal deficits  [ ] Cognitive impairment  [ ] Dysphagia [ ]Dysarthria [ ] Paresis [ ]Other     SKIN:   [ ]Normal  [ ]Rash   [ ]Pressure ulcer(s) [ ]y [ ]n present on admission [X] wound    -------------------------------------------------------------------------------------------------------    LABS:                        8.0    3.68  )-----------( 184      ( 08 Nov 2022 05:47 )             24.9   11-08    134<L>  |  102  |  15  ----------------------------<  139<H>  4.3   |  24  |  0.62    Ca    8.4      08 Nov 2022 05:47  Phos  3.6     11-08  Mg     1.80     11-08    PT/INR - ( 08 Nov 2022 05:47 )   PT: 16.4 sec;   INR: 1.41 ratio         PTT - ( 08 Nov 2022 05:47 )  PTT:30.5 sec    -------------------------------------------------------------------------------------------------------  RADIOLOGY & ADDITIONAL STUDIES:   < from: Xray Abdomen 1 View PORTABLE -Urgent (Xray Abdomen 1 View PORTABLE -Urgent .) (11.01.22 @ 15:39) >  Extensive spinal hardware and total left hip arthroplasty.  There are no dilated loops of small bowel.  Bowel gas and stool are identified in the within the colon and rectum.  There is no free air visualized.  IMPRESSION:  Nonobstructive bowel gas pattern.      -------------------------------------------------------------------------------------------------------  MEDICATIONS:     MEDICATIONS  (STANDING):  acetaminophen     Tablet .. 650 milliGRAM(s) Oral every 6 hours  ALPRAZolam 0.25 milliGRAM(s) Oral at bedtime  aMIOdarone    Tablet 200 milliGRAM(s) Oral daily  DULoxetine 60 milliGRAM(s) Oral daily  gabapentin 100 milliGRAM(s) Oral three times a day  heparin  Infusion. 1000 Unit(s)/Hr (10 mL/Hr) IV Continuous <Continuous>  influenza  Vaccine (HIGH DOSE) 0.7 milliLiter(s) IntraMuscular once  levothyroxine 25 MICROGram(s) Oral daily  metoprolol succinate ER 50 milliGRAM(s) Oral daily  pancrelipase  (CREON 24,000 Lipase Units) 3 Capsule(s) Oral three times a day with meals  pantoprazole    Tablet 40 milliGRAM(s) Oral before breakfast  sacubitril 49 mG/valsartan 51 mG 1 Tablet(s) Oral two times a day  tamsulosin 0.4 milliGRAM(s) Oral at bedtime    MEDICATIONS  (PRN):  artificial tears (preservative free) Ophthalmic Solution 1 Drop(s) Both EYES every 2 hours PRN Dry Eyes  diphenhydrAMINE 25 milliGRAM(s) Oral every 6 hours PRN Rash and/or Itching  heparin   Injectable 7000 Unit(s) IV Push every 6 hours PRN For aPTT less than 40  heparin   Injectable 3500 Unit(s) IV Push every 6 hours PRN For aPTT between 40 - 57  LORazepam   Injectable 0.25 milliGRAM(s) IV Push every 6 hours PRN Nausea and/or Vomiting  melatonin 3 milliGRAM(s) Oral at bedtime PRN Insomnia  oxyCODONE    IR 5 milliGRAM(s) Oral every 4 hours PRN Moderate Pain (4 - 6)  simethicone 80 milliGRAM(s) Chew three times a day PRN Gas      -------------------------------------------------------------------------------------------------------    CRITICAL CARE:  [ ]Shock Present  [ ]Septic [ ]Cardiogenic [ ]Neurologic [ ]Hypovolemic  [ ]Vasopressors [ ]Inotropes  [ ]Respiratory failure present [ ]Mechanical Ventilation [ ]Non-invasive ventilatory support [ ]High-Flow   [ ]Acute  [ ]Chronic [ ]Hypoxic  [ ]Hypercarbic [ ]Other  [ ]Other organ failure     -------------------------------------------------------------------------------------------------------  REFERRALS:   [ ]Chaplaincy  [ ]Hospice  [ ]Child Life  [ ]Social Work  [ ]Case management [ ]Holistic Therapy

## 2022-11-08 NOTE — CHART NOTE - NSCHARTNOTEFT_GEN_A_CORE
Vital Signs Last 24 Hrs  T(C): 36.3 (08 Nov 2022 14:10), Max: 37.2 (08 Nov 2022 06:09)  T(F): 97.3 (08 Nov 2022 14:10), Max: 98.9 (08 Nov 2022 06:09)  HR: 85 (08 Nov 2022 14:10) (84 - 122)  BP: 90/44 (08 Nov 2022 14:10) (88/49 - 130/65)  BP(mean): --  RR: 17 (08 Nov 2022 14:10) (16 - 20)  SpO2: 100% (08 Nov 2022 14:10) (95% - 100%)    Parameters below as of 08 Nov 2022 14:10  Patient On (Oxygen Delivery Method): room air                            8.0    3.68  )-----------( 184      ( 08 Nov 2022 05:47 )             24.9   11-08    134<L>  |  102  |  15  ----------------------------<  139<H>  4.3   |  24  |  0.62    Ca    8.4      08 Nov 2022 05:47  Phos  3.6     11-08  Mg     1.80     11-08    Informed by RN this morning patient with dizziness while in bathroom, denies SOB, chest pain, N/V, blurry vision, Noted with soft BPs 90s/50, discussed findings with Dr. Mccrary at bedside- avoid IVF, hold lasix. Spoke with GI Dr. Xiong> no need for upper GI series, ok to have water/ice chips, NGT later today as discussed with Dr. Mccrary, resumed heparin gtt and to be stopped at midnight for repeat EUS tomorrow. Plan of care discussed with patient, patient 's son at bedside and RN, will closely monitor

## 2022-11-08 NOTE — PROGRESS NOTE ADULT - ASSESSMENT
80 yo M w/ HTN, HLD, HFrEF (EF 30-35%), VT, s/p ACID, AS s/p TAVR, CAD s/p stents, GERD, hypothyroid, anxiety, pancreatic ca s/p whipple 10/26/21, chemo and xrt with non-healing abdominal wound treated with recent admission/discharge on 9/29  p/w bleeding from abdominal incision site with CT abd shows- New portal venous thrombosis involving the main, right, and left portal veins. The portal splenic confluence and central splenic vein are not well visualized and may be thrombosed. Status post Whipple procedure. Question ill-defined soft tissue encasing and obliterating the proximal main portal vein and portal splenic confluence, and encasing the hepatic artery, raising the possibility of locally recurrent disease. IR initially consulted but no percutaneous window for Biopsy. [facility] called for evaluation for possible EUS w/ biopsy given concern for recurrence.     #Hx of pancreatic adenocarcinoma s/p whipple, chemo and radiation  #Non-healing abdominal wound  #Acute portal vein thrombosis   #Questionable ill-defined soft tissue encasing/obliterating portal vein and hepatic artery    Recommendations:  -Discussed with Hem/Onc and although they are not considering chemotherapy (due to poor wound healing at this time) they would assess for check-point inh marker for possible immunotherapy.  -EUS aborted due to retained food in the stomach  -Obtain upper GI series   -Please place NG tube for decompression of the stomach after obtaining above study  -[facility] to clear liquid diet  -NPO after midnight, tentatively can plan for EUS tomorrow     Recommendations preliminary until signed by attending.     Shay Crespo MD  Gastroenterology/Hepatology Fellow  1st option: [phone] (text or call), ONLY available from 7:00 am to 5:00 pm.   **Contact on-call GI fellow via answering service ([phone]) from 5pm-7am AND on weekends/holidays**  2nd option: Available via Microsoft Teams  3rd option: Pager: [phone]            82 yo M w/ HTN, HLD, HFrEF (EF 30-35%), VT, s/p ACID, AS s/p TAVR, CAD s/p stents, GERD, hypothyroid, anxiety, pancreatic ca s/p whipple 10/26/21, chemo and xrt with non-healing abdominal wound treated with recent admission/discharge on 9/29  p/w bleeding from abdominal incision site with CT abd shows- New portal venous thrombosis involving the main, right, and left portal veins. The portal splenic confluence and central splenic vein are not well visualized and may be thrombosed. Status post Whipple procedure. Question ill-defined soft tissue encasing and obliterating the proximal main portal vein and portal splenic confluence, and encasing the hepatic artery, raising the possibility of locally recurrent disease. IR initially consulted but no percutaneous window for Biopsy. [facility] called for evaluation for possible EUS w/ biopsy given concern for recurrence.     #Hx of pancreatic adenocarcinoma s/p whipple, chemo and radiation  #Non-healing abdominal wound  #Acute portal vein thrombosis   #Questionable ill-defined soft tissue encasing/obliterating portal vein and hepatic artery    Recommendations:  -Discussed with Hem/Onc and although they are not considering chemotherapy (due to poor wound healing at this time) they would assess for check-point inh marker for possible immunotherapy.  -EUS aborted due to retained food in the stomach  -Please place NG tube for decompression of the stomach after obtaining above study  -[facility] to clear liquid diet  -NPO after midnight, tentatively can plan for EUS tomorrow     Recommendations preliminary until signed by attending.     Shay Crespo MD  Gastroenterology/Hepatology Fellow  1st option: [phone] (text or call), ONLY available from 7:00 am to 5:00 pm.   **Contact on-call GI fellow via answering service ([phone]) from 5pm-7am AND on weekends/holidays**  2nd option: Available via Microsoft Teams  3rd option: Pager: [phone]

## 2022-11-08 NOTE — PROGRESS NOTE ADULT - ASSESSMENT
82 yo M w/ HTN, HLD, HFrEF (EF 30-35%), VT, s/p ACID, AS s/p TAVR, CAD s/p stents, GERD, hypothyroid, anxiety, pancreatic ca s/p whipple 10/26/21, chemo and xrt with non-healing abdominal wound treated w/ HBOT and STSG 7/8/22, s/p repeat STSG wound vac placement on 9/29, p/w bleeding from abdominal incision site now s/p new wound vac this admission placed by Plastics. CT abdomen pelvis concerning findings of thrombosed portal vein and new ascites, New wedge-shaped lesion throughout the liver, likely perfusion abnormalities related to new portal venous. Tumor markers also increased. GI and oncology consulted, now s/p EUS on 11/7. Palliative care consult for symptom management in the setting of advanced illness.

## 2022-11-08 NOTE — CHART NOTE - NSCHARTNOTEFT_GEN_A_CORE
Pt seen at bedside to attempt NGT placement. Pt with 3 episodes of loose BMs, endorsing abdominal pain. After multiple attempts, provider was unable to place NGT. Pt with brown/green vomitus after NGT attempts. Will make GI aware. Plan for EGD/EUS in AM.     loose BMs  - r/o Cdiff/ other infection  - Cdiff PCR, stool culture, GI PCR, Ova and parasite sent.   - Contact Isolation for possible cdiff.   - pain management     will continue to monitor Pt seen at bedside to attempt NGT placement. Pt with 3 episodes of loose BMs, endorsing abdominal pain. After multiple attempts, provider was unable to place NGT. Pt with brown/green vomitus after NGT attempts. Will make GI aware. Plan for EGD/EUS in AM.     loose BMs  - r/o Cdiff/ other infection  - Cdiff PCR, stool culture, GI PCR, Ova and parasite sent.   - Contact Isolation for possible cdiff.   - pain management       Addendum:  Pt with multiple additional episodes of loose stool, becoming more watery. Stool samples sent.   - NPO for procedure, FSq6, VS q4  - morning labs at 2AM, monitor lytes    continue to monitor

## 2022-11-08 NOTE — PROGRESS NOTE ADULT - SUBJECTIVE AND OBJECTIVE BOX
Interval Events:   Patient denies any abdominal pain at this time.  EUS aborted due to retained food in the stomach    Hospital Medications:  acetaminophen     Tablet .. 650 milliGRAM(s) Oral every 6 hours  ALPRAZolam 0.25 milliGRAM(s) Oral at bedtime  aMIOdarone    Tablet 200 milliGRAM(s) Oral daily  artificial tears (preservative free) Ophthalmic Solution 1 Drop(s) Both EYES every 2 hours PRN  diphenhydrAMINE 25 milliGRAM(s) Oral every 6 hours PRN  DULoxetine 60 milliGRAM(s) Oral daily  gabapentin 100 milliGRAM(s) Oral three times a day  influenza  Vaccine (HIGH DOSE) 0.7 milliLiter(s) IntraMuscular once  levothyroxine 25 MICROGram(s) Oral daily  melatonin 3 milliGRAM(s) Oral at bedtime PRN  metoclopramide Injectable 10 milliGRAM(s) IV Push every 4 hours PRN  metoprolol succinate ER 50 milliGRAM(s) Oral daily  ondansetron Injectable 8 milliGRAM(s) IV Push every 8 hours PRN  oxyCODONE    IR 5 milliGRAM(s) Oral every 4 hours PRN  pancrelipase  (CREON 24,000 Lipase Units) 3 Capsule(s) Oral three times a day with meals  pantoprazole    Tablet 40 milliGRAM(s) Oral before breakfast  sacubitril 49 mG/valsartan 51 mG 1 Tablet(s) Oral two times a day  simethicone 80 milliGRAM(s) Chew three times a day PRN  tamsulosin 0.4 milliGRAM(s) Oral at bedtime      ROS: All system reviewed and negative except as mentioned above.    PHYSICAL EXAM:   Vital Signs:  Vital Signs Last 24 Hrs  T(C): 37.2 (2022 06:09), Max: 37.2 (2022 06:09)  T(F): 98.9 (2022 06:09), Max: 98.9 (2022 06:09)  HR: 88 (2022 06:09) (84 - 97)  BP: 100/48 (2022 06:09) (98/54 - 130/65)  BP(mean): --  RR: 18 (2022 06:09) (16 - 20)  SpO2: 98% (2022 06:09) (96% - 100%)    Parameters below as of 2022 06:09  Patient On (Oxygen Delivery Method): room air      Daily Height in cm: 182.88 (2022 16:03)    Daily Weight in k (2022 14:04)    General: WDWN male resting in bed   HEENT: NC/AT; PERRL, anicteric sclera; MMM  Neck: supple  Cardiovascular: +S1/S2; RRR  Respiratory: CTA B/L; no W/R/R  Gastrointestinal: soft, NT/ND; +BSx4. Midline wound vac in place.   Extremities: WWP; no edema, clubbing or cyanosis  Vascular: 2+ radial, DP/PT pulses B/L  Neurological: AAOx3; no focal deficits    LABS:                        8.0    3.68  )-----------( 184      ( 2022 05:47 )             24.9     Mean Cell Volume: 89.6 fL (-22 @ 05:47)        134<L>  |  102  |  15  ----------------------------<  139<H>  4.3   |  24  |  0.62    Ca    8.4      2022 05:47  Phos  3.6       Mg     1.80             PT/INR - ( 2022 05:47 )   PT: 16.4 sec;   INR: 1.41 ratio         PTT - ( 2022 05:47 )  PTT:30.5 sec                            8.0    3.68  )-----------( 184      ( 2022 05:47 )             24.9                         8.5    4.46  )-----------( 190      ( 2022 13:15 )             26.2                         7.1    3.43  )-----------( 183      ( 2022 05:50 )             22.7                         7.6    4.35  )-----------( 213      ( 2022 10:26 )             24.3                         7.3    3.82  )-----------( 177      ( 2022 10:45 )             23.7       Imaging: Images reviewed.

## 2022-11-09 PROBLEM — K43.2 INCISIONAL HERNIA, WITHOUT OBSTRUCTION OR GANGRENE: Status: ACTIVE | Noted: 2022-01-01

## 2022-11-09 NOTE — PROGRESS NOTE ADULT - ASSESSMENT
ASSESSMENT:  This is a 80 yo M with complex abdominal wound, being managed with vac/local wound care    Plan  - Vac change scheduled for next Monday  - Wound bed healing appropriately  - Remainder of care and oncologic workup per medicine and heme/onc  - concern for abdominal fistula given strong odor in room, will discuss with team for further recs  - Plan discussed with Dr. Gallegos   ASSESSMENT:  This is a 80 yo M with complex abdominal wound, being managed with vac/local wound care    Plan  - Plan to d/c vac Friday if output continues to be low  - Wound bed healing appropriately  - Remainder of care and oncologic workup per medicine and heme/onc  - ok to start chemotherapy at any point, no contraindication from PRS perspective  - Plan discussed with Dr. Gallegos

## 2022-11-09 NOTE — CHART NOTE - NSCHARTNOTEFT_GEN_A_CORE
Vital Signs Last 24 Hrs  T(C): 36.4 (09 Nov 2022 09:02), Max: 36.9 (09 Nov 2022 06:30)  T(F): 97.6 (09 Nov 2022 09:02), Max: 98.5 (09 Nov 2022 06:30)  HR: 89 (09 Nov 2022 09:02) (88 - 98)  BP: 106/49 (09 Nov 2022 09:02) (92/51 - 122/51)  BP(mean): --  RR: 18 (09 Nov 2022 09:02) (17 - 18)  SpO2: 92% (09 Nov 2022 09:02) (92% - 100%)    Parameters below as of 09 Nov 2022 09:02  Patient On (Oxygen Delivery Method): room air                          8.7    4.99  )-----------( 204      ( 09 Nov 2022 04:30 )             27.0   11-09    131<L>  |  101  |  18  ----------------------------<  117<H>  3.7   |  20<L>  |  0.69    Ca    9.0      09 Nov 2022 04:30  Phos  3.8     11-09  Mg     1.80     11-09    Results and case discussed with Dr. Mccrary this morning, patient was planned for EUS today, spoke with Sheeba Muhammad this morning.  However this afternoon informed by ORACIO Da Silva that procedure is cancelled, recommend to start clear liquid diet, and NGT placement.    Patient with no c/o abdominal pain, N/V. Writer and another provider attempted to insert NGT at bedside, unsuccessful and patient unable to tolerate. GI and Dr. Mccrary informed. Writer discussed plan of care with patient, patient's family at bedside and daughter PippaORACIO to discuss further plans with patient's daughter. As discussed with Dr. Mccrary- started on clears diet, restarted heparin gtt and NPO at midnight for tentative EUS tomorrow per GI, discussed with RN

## 2022-11-09 NOTE — RAPID RESPONSE TEAM SUMMARY - NSOTHERINTERVENTIONSRRT_GEN_ALL_CORE
f/u CBC, if rapidly downtrending hemoglobin, can f/u with abdominal imaging for bleed given worsening abdominal/flank pain, can call IR vs surgery

## 2022-11-09 NOTE — PROGRESS NOTE ADULT - SUBJECTIVE AND OBJECTIVE BOX
Merlin Mathew, MD   Hospitalist  Pager #[phone]    PROGRESS NOTE:     Patient is a 81y old  Male who presents with a chief complaint of Postoperative wound bleed (09 Nov 2022 07:43)      SUBJECTIVE / OVERNIGHT EVENTS: NEON   Patient understands plan for EUS  Later cancelled by GI   Denies any abdominal pain, N/V, F/C     ADDITIONAL REVIEW OF SYSTEMS:    MEDICATIONS  (STANDING):  acetaminophen     Tablet .. 650 milliGRAM(s) Oral every 6 hours  ALPRAZolam 0.25 milliGRAM(s) Oral at bedtime  aMIOdarone    Tablet 200 milliGRAM(s) Oral daily  DULoxetine 60 milliGRAM(s) Oral daily  gabapentin 100 milliGRAM(s) Oral three times a day  heparin  Infusion. 1200 Unit(s)/Hr (12 mL/Hr) IV Continuous <Continuous>  influenza  Vaccine (HIGH DOSE) 0.7 milliLiter(s) IntraMuscular once  levothyroxine 25 MICROGram(s) Oral daily  magnesium sulfate  IVPB 1 Gram(s) IV Intermittent once  metoprolol succinate ER 50 milliGRAM(s) Oral daily  pancrelipase  (CREON 24,000 Lipase Units) 3 Capsule(s) Oral three times a day with meals  pantoprazole    Tablet 40 milliGRAM(s) Oral before breakfast  potassium chloride   Powder 40 milliEquivalent(s) Oral once  sacubitril 49 mG/valsartan 51 mG 1 Tablet(s) Oral two times a day  tamsulosin 0.4 milliGRAM(s) Oral at bedtime    MEDICATIONS  (PRN):  artificial tears (preservative free) Ophthalmic Solution 1 Drop(s) Both EYES every 2 hours PRN Dry Eyes  diphenhydrAMINE 25 milliGRAM(s) Oral every 6 hours PRN Rash and/or Itching  LORazepam   Injectable 0.25 milliGRAM(s) IV Push every 6 hours PRN Nausea and/or Vomiting  melatonin 3 milliGRAM(s) Oral at bedtime PRN Insomnia  oxyCODONE    IR 5 milliGRAM(s) Oral every 4 hours PRN Moderate Pain (4 - 6)  simethicone 80 milliGRAM(s) Chew three times a day PRN Gas      CAPILLARY BLOOD GLUCOSE      POCT Blood Glucose.: 113 mg/dL (09 Nov 2022 09:08)  POCT Blood Glucose.: 113 mg/dL (09 Nov 2022 02:37)    I&O's Summary    08 Nov 2022 07:01  -  09 Nov 2022 07:00  --------------------------------------------------------  IN: 0 mL / OUT: 300 mL / NET: -300 mL    09 Nov 2022 07:01  -  09 Nov 2022 15:00  --------------------------------------------------------  IN: 0 mL / OUT: 100 mL / NET: -100 mL        PHYSICAL EXAM:  Vital Signs Last 24 Hrs  T(C): 36.4 (09 Nov 2022 09:02), Max: 36.9 (09 Nov 2022 06:30)  T(F): 97.6 (09 Nov 2022 09:02), Max: 98.5 (09 Nov 2022 06:30)  HR: 89 (09 Nov 2022 09:02) (88 - 98)  BP: 106/49 (09 Nov 2022 09:02) (92/51 - 122/51)  BP(mean): --  RR: 18 (09 Nov 2022 09:02) (17 - 18)  SpO2: 92% (09 Nov 2022 09:02) (92% - 100%)    Parameters below as of 09 Nov 2022 09:02  Patient On (Oxygen Delivery Method): room air          CONSTITUTIONAL: NAD, well-developed male   RESPIRATORY: Normal respiratory effort; lungs are clear to auscultation bilaterally  CARDIOVASCULAR: Regular rate and rhythm, normal S1 and S2, no murmur/rub/gallop; No lower extremity edema; Peripheral pulses are 2+ bilaterally  ABDOMEN: Midline abdominal wound with wound vac in place, tender to palpation; normoactive bowel sounds, no rebound/guarding; No hepatosplenomegaly  MUSCULOSKELETAL no clubbing or cyanosis of digits; no joint swelling or tenderness to palpation  PSYCH: A+O to person, place, and time; affect appropriate    LABS:                        8.7    4.99  )-----------( 204      ( 09 Nov 2022 04:30 )             27.0     11-09    131<L>  |  101  |  18  ----------------------------<  117<H>  3.7   |  20<L>  |  0.69    Ca    9.0      09 Nov 2022 04:30  Phos  3.8     11-09  Mg     1.80     11-09      PT/INR - ( 09 Nov 2022 04:30 )   PT: 16.7 sec;   INR: 1.43 ratio         PTT - ( 09 Nov 2022 04:30 )  PTT:32.5 sec            RADIOLOGY & ADDITIONAL TESTS:  Results Reviewed:   Imaging Personally Reviewed:  Electrocardiogram Personally Reviewed:    COORDINATION OF CARE:  Care Discussed with Consultants/Other Providers [Y/N]:  Prior or Outpatient Records Reviewed [Y/N]:

## 2022-11-09 NOTE — PROGRESS NOTE ADULT - SUBJECTIVE AND OBJECTIVE BOX
Plastic Surgery Progress Note (pg [facility]: [id number], [facility]: [phone])    SUBJECTIVE  The patient was seen and examined. Patient without complaints. Strong fecal odor in room.    OBJECTIVE  ___________________________________________________  VITAL SIGNS / I&O's   Vital Signs Last 24 Hrs  T(C): 36.9 (09 Nov 2022 06:30), Max: 36.9 (09 Nov 2022 06:30)  T(F): 98.5 (09 Nov 2022 06:30), Max: 98.5 (09 Nov 2022 06:30)  HR: 97 (09 Nov 2022 06:30) (85 - 122)  BP: 92/51 (09 Nov 2022 06:30) (88/49 - 122/51)  BP(mean): --  RR: 18 (09 Nov 2022 06:30) (17 - 18)  SpO2: 98% (09 Nov 2022 06:30) (95% - 100%)    Parameters below as of 09 Nov 2022 06:30  Patient On (Oxygen Delivery Method): room air          08 Nov 2022 07:01  -  09 Nov 2022 07:00  --------------------------------------------------------  IN:  Total IN: 0 mL    OUT:    Oral Fluid: 0 mL    VAC (Vacuum Assisted Closure) System (mL): 0 mL    Voided (mL): 300 mL  Total OUT: 300 mL    Total NET: -300 mL        ___________________________________________________  PHYSICAL EXAM    -- CONSTITUTIONAL: NAD, lying in bed  -- NEURO: Awake, alert  -- CARD: hemodynamically stable  -- PULM: Non-labored respirations  -- ABDOMEN: vac in place holding suction with serous output      ___________________________________________________  LABS                        8.7    4.99  )-----------( 204      ( 09 Nov 2022 04:30 )             27.0     09 Nov 2022 04:30    131    |  101    |  18     ----------------------------<  117    3.7     |  20     |  0.69     Ca    9.0        09 Nov 2022 04:30  Phos  3.8       09 Nov 2022 04:30  Mg     1.80      09 Nov 2022 04:30      PT/INR - ( 09 Nov 2022 04:30 )   PT: 16.7 sec;   INR: 1.43 ratio         PTT - ( 09 Nov 2022 04:30 )  PTT:32.5 sec  CAPILLARY BLOOD GLUCOSE      POCT Blood Glucose.: 113 mg/dL (09 Nov 2022 02:37)      ___________________________________________________  MEDICATIONS  (STANDING):  acetaminophen     Tablet .. 650 milliGRAM(s) Oral every 6 hours  ALPRAZolam 0.25 milliGRAM(s) Oral at bedtime  aMIOdarone    Tablet 200 milliGRAM(s) Oral daily  DULoxetine 60 milliGRAM(s) Oral daily  gabapentin 100 milliGRAM(s) Oral three times a day  influenza  Vaccine (HIGH DOSE) 0.7 milliLiter(s) IntraMuscular once  levothyroxine 25 MICROGram(s) Oral daily  metoprolol succinate ER 50 milliGRAM(s) Oral daily  pancrelipase  (CREON 24,000 Lipase Units) 3 Capsule(s) Oral three times a day with meals  pantoprazole    Tablet 40 milliGRAM(s) Oral before breakfast  sacubitril 49 mG/valsartan 51 mG 1 Tablet(s) Oral two times a day  tamsulosin 0.4 milliGRAM(s) Oral at bedtime    MEDICATIONS  (PRN):  artificial tears (preservative free) Ophthalmic Solution 1 Drop(s) Both EYES every 2 hours PRN Dry Eyes  diphenhydrAMINE 25 milliGRAM(s) Oral every 6 hours PRN Rash and/or Itching  LORazepam   Injectable 0.25 milliGRAM(s) IV Push every 6 hours PRN Nausea and/or Vomiting  melatonin 3 milliGRAM(s) Oral at bedtime PRN Insomnia  oxyCODONE    IR 5 milliGRAM(s) Oral every 4 hours PRN Moderate Pain (4 - 6)  simethicone 80 milliGRAM(s) Chew three times a day PRN Gas          Assessment & Plan        Geeta Jones  Plastic & Reconstructive Surgery, PGY-1  #[id number]

## 2022-11-09 NOTE — PROVIDER CONTACT NOTE (OTHER) - ASSESSMENT
A&OX4. Pt. c/o 10/10 sharp left flank pain. Denies any chest pain or SOB.  B/P: 84/49  HR: 101  Temp: 97.2  RR: 19  96% on room air A&OX4. Pt. c/o 10/10 sharp left flank pain. Denies any chest pain or SOB.  B/P: 84/33  HR: 101  Temp: 97.2  RR: 19  96% on room air

## 2022-11-09 NOTE — CHART NOTE - NSCHARTNOTEFT_GEN_A_CORE
GI Brief Note:    Unable to do EUS given unable to decompress stomach via NG tube.     Updated family (Una) and team, will plan to place NG tube today again and plan for possible EUS tomorrow.    NPO after midnight  Please correct Sodium       Shay Crespo MD  Gastroenterology/Hepatology Fellow  1st option: [phone] (text or call), ONLY available from 7:00 am to 5:00 pm.   **Contact on-call GI fellow via answering service ([phone]) from 5pm-7am AND on weekends/holidays**  2nd option: Available via [employer] Teams  3rd option: Pager: [phone]

## 2022-11-09 NOTE — CHART NOTE - NSCHARTNOTEFT_GEN_A_CORE
Notified by RN pt BP 84/33, RRT called. Pt endorsing new left flank pain. Pt A&Ox4, denies CP, SOB, palpitations, dizziness, lightheadedness, N/V, dysuria, urinary frequency,     Vital Signs Last 24 Hrs  T(C): 36.2 (09 Nov 2022 20:09), Max: 36.9 (09 Nov 2022 06:30)  T(F): 97.2 (09 Nov 2022 20:09), Max: 98.5 (09 Nov 2022 06:30)  HR: 99 (09 Nov 2022 20:09) (89 - 101)  BP: 109/51 (09 Nov 2022 20:09) (84/33 - 112/55)  BP(mean): --  RR: 19 (09 Nov 2022 20:09) (18 - 19)  SpO2: 99% (09 Nov 2022 20:09) (92% - 100%)    PHYSICAL EXAM:  General: Awake and alert.  No acute distress.  Head: Normocephalic, atraumatic.    Eyes: PERRL.   Heart: RRR.  Normal S1 and S2.  No murmurs, rubs, or gallops.  No LE edema b/l.   Lungs: Nonlabored breathing.  Good inspiratory effort.  CTAB.  No wheezes, crackles, or rhonchi.    Abdomen: soft, mildly tender in RLQ, ND; +BSx4. Midline wound vac in place.   Skin: Warm and dry. Mild pallor  Extremities: No cyanosis.  2+ peripheral pulses b/l.  Neuro: A&Ox4    RRT called for hypotension, likely 2/2 fluid loss 2/2 diarrhea   - BP now 109/51  - 250cc LR bolus over 30min, consider albumin if BPs low (albumin 2.7)  - CBC (hgb 8.5), BMP (Na 131)  - F/u UA and CT abd/p noncon   -  during rapid, EKG NSR    will continue to monitor Notified by RN pt BP 84/33, RRT called. Pt endorsing new left flank pain. Pt A&Ox4, denies CP, SOB, palpitations, dizziness, lightheadedness, N/V, dysuria, urinary frequency,     Vital Signs Last 24 Hrs  T(C): 36.2 (09 Nov 2022 20:09), Max: 36.9 (09 Nov 2022 06:30)  T(F): 97.2 (09 Nov 2022 20:09), Max: 98.5 (09 Nov 2022 06:30)  HR: 99 (09 Nov 2022 20:09) (89 - 101)  BP: 109/51 (09 Nov 2022 20:09) (84/33 - 112/55)  BP(mean): --  RR: 19 (09 Nov 2022 20:09) (18 - 19)  SpO2: 99% (09 Nov 2022 20:09) (92% - 100%)    PHYSICAL EXAM:  General: Awake and alert.  No acute distress.  Head: Normocephalic, atraumatic.    Eyes: PERRL.   Heart: RRR.  Normal S1 and S2.  No murmurs, rubs, or gallops.  No LE edema b/l.   Lungs: Nonlabored breathing.  Good inspiratory effort.  CTAB.  No wheezes, crackles, or rhonchi.    Abdomen: soft, mildly tender in RLQ, ND; +BSx4. Midline wound vac in place.   Skin: Warm and dry. Mild pallor  Extremities: No cyanosis.  2+ peripheral pulses b/l.  Neuro: A&Ox4    RRT called for hypotension, likely 2/2 fluid loss 2/2 diarrhea   - BP now 109/51  - 250cc LR bolus over 30min, consider albumin if BPs low (albumin 2.7)  - CBC (hgb 8.5), BMP (Na 131)  - F/u UA and CT abd/p noncon   -  during rapid, EKG NSR    Addendum:  [facility] made aware. Recommending another 250cc bolus of NS for BP 93/48.    will continue to monitor

## 2022-11-09 NOTE — RAPID RESPONSE TEAM SUMMARY - NSADDTLFINDINGSRRT_GEN_ALL_CORE
Throughout past 2 days, patient with profuse watery stools, undergoing Cdiff r/o, BPs have been soft throughout day, received 250cc over 3 hours today. RRT called for SBP in 80s. Patient seen at bedside, mentating well, exam nonfocal except for abdominal tenderness. Patient's repeat  systolic, given fluid losses recommend 500cc over 1 hour, if refractory can consider additional IVF vs albumin (c/f ascites per primary team). Repeat . CBC ordered to trend hemoglobin given worsening anemia, abdominal pain.

## 2022-11-09 NOTE — REASON FOR VISIT
[Post Op: _________] : a [unfilled] post op visit [Family Member] : family member [FreeTextEntry1] : s/p split thickness skin graft to abdomen DOS:09/29/2022.Patient complaints of pain, taking Tynelol as needed. \par

## 2022-11-10 NOTE — PRE-OP CHECKLIST - INTERNAL PROSTHESES
L total hip replacement, laminectomy, L femur w/hardware, Aortic valve replacement/yes(specify)
left hip replacement

## 2022-11-10 NOTE — PROGRESS NOTE ADULT - SUBJECTIVE AND OBJECTIVE BOX
Merlin Mathew, MD   Hospitalist  Pager #[phone]    PROGRESS NOTE:     Patient is a 81y old  Male who presents with a chief complaint of Postoperative wound bleed (2022 14:58)      SUBJECTIVE / OVERNIGHT EVENTS:   RRT overnight- see chart note for details   This AM, patient feels well- denied any lightheadedness during RRT last night, had some R back/ flank pain at the time which has since resolved. Pt reports he does experience flank/back pain intermittently, denies any urinary sx.   BP improved today   Last BM was last evening, had 4 watery BM yesterday, none today    ADDITIONAL REVIEW OF SYSTEMS:    MEDICATIONS  (STANDING):  acetaminophen     Tablet .. 650 milliGRAM(s) Oral every 6 hours  ALPRAZolam 0.25 milliGRAM(s) Oral at bedtime  aMIOdarone    Tablet 200 milliGRAM(s) Oral daily  DULoxetine 60 milliGRAM(s) Oral daily  gabapentin 100 milliGRAM(s) Oral three times a day  influenza  Vaccine (HIGH DOSE) 0.7 milliLiter(s) IntraMuscular once  levothyroxine 25 MICROGram(s) Oral daily  metoprolol succinate ER 50 milliGRAM(s) Oral daily  pancrelipase  (CREON 24,000 Lipase Units) 3 Capsule(s) Oral three times a day with meals  pantoprazole    Tablet 40 milliGRAM(s) Oral before breakfast  sacubitril 49 mG/valsartan 51 mG 1 Tablet(s) Oral two times a day  tamsulosin 0.4 milliGRAM(s) Oral at bedtime    MEDICATIONS  (PRN):  artificial tears (preservative free) Ophthalmic Solution 1 Drop(s) Both EYES every 2 hours PRN Dry Eyes  diphenhydrAMINE 25 milliGRAM(s) Oral every 6 hours PRN Rash and/or Itching  LORazepam   Injectable 0.25 milliGRAM(s) IV Push every 6 hours PRN Nausea and/or Vomiting  melatonin 3 milliGRAM(s) Oral at bedtime PRN Insomnia  oxyCODONE    IR 5 milliGRAM(s) Oral every 4 hours PRN moderate to severe pain  simethicone 80 milliGRAM(s) Chew three times a day PRN Gas      CAPILLARY BLOOD GLUCOSE      POCT Blood Glucose.: 106 mg/dL (10 Nov 2022 06:02)  POCT Blood Glucose.: 118 mg/dL (10 Nov 2022 00:31)  POCT Blood Glucose.: 142 mg/dL (2022 19:36)  POCT Blood Glucose.: 108 mg/dL (2022 15:03)    I&O's Summary    2022 07:01  -  10 Nov 2022 07:00  --------------------------------------------------------  IN: 0 mL / OUT: 345 mL / NET: -345 mL        PHYSICAL EXAM:  Vital Signs Last 24 Hrs  T(C): 37.1 (10 Nov 2022 09:04), Max: 37.1 (10 Nov 2022 09:04)  T(F): 98.7 (10 Nov 2022 09:04), Max: 98.7 (10 Nov 2022 09:04)  HR: 95 (10 Nov 2022 09:04) (93 - 101)  BP: 125/40 (10 Nov 2022 09:04) (84/33 - 125/40)  BP(mean): --  RR: 17 (10 Nov 2022 09:04) (17 - 19)  SpO2: 94% (10 Nov 2022 09:04) (94% - 100%)    Parameters below as of 10 Nov 2022 06:00  Patient On (Oxygen Delivery Method): room air        CONSTITUTIONAL: NAD, well-developed male   RESPIRATORY: Normal respiratory effort; lungs are clear to auscultation bilaterally  CARDIOVASCULAR: Regular rate and rhythm, normal S1 and S2, no murmur/rub/gallop; No lower extremity edema; Peripheral pulses are 2+ bilaterally  ABDOMEN: Midline abdominal wound with wound vac in place, tender to palpation; normoactive bowel sounds, no rebound/guarding; No hepatosplenomegaly  MUSCULOSKELETAL no clubbing or cyanosis of digits; no joint swelling or tenderness to palpation  PSYCH: A+O to person, place, and time; affect appropriate    LABS:                        7.8    4.26  )-----------( 209      ( 10 Nov 2022 05:48 )             24.5     11-10    130<L>  |  99  |  17  ----------------------------<  106<H>  3.9   |  21<L>  |  0.72    Ca    8.7      10 Nov 2022 05:48  Phos  3.4     11-10  Mg     1.80     11-10    TPro  6.1  /  Alb  2.7<L>  /  TBili  0.8  /  DBili  x   /  AST  28  /  ALT  19  /  AlkPhos  135<H>  11-09    PT/INR - ( 10 Nov 2022 05:48 )   PT: 16.5 sec;   INR: 1.42 ratio         PTT - ( 10 Nov 2022 05:48 )  PTT:33.3 sec      Urinalysis Basic - ( 2022 23:40 )    Color: Yellow / Appearance: Slightly Turbid / S.032 / pH: x  Gluc: x / Ketone: Trace  / Bili: Negative / Urobili: 3 mg/dL   Blood: x / Protein: 30 mg/dL / Nitrite: Negative   Leuk Esterase: Small / RBC: 4 /HPF / WBC 5 /HPF   Sq Epi: x / Non Sq Epi: 5 /HPF / Bacteria: Few          RADIOLOGY & ADDITIONAL TESTS:  Results Reviewed:   Imaging Personally Reviewed:  Electrocardiogram Personally Reviewed:    COORDINATION OF CARE:  Care Discussed with Consultants/Other Providers [Y/N]:  Prior or Outpatient Records Reviewed [Y/N]:

## 2022-11-10 NOTE — PROVIDER CONTACT NOTE (OTHER) - BACKGROUND
patient admitted for complications of procedure. Hx of HTN, GERD, HLD
80 yo M with complex abdominal wound, being managed with vac/local wound care
Admitted for abd. wound bleed

## 2022-11-10 NOTE — PROVIDER CONTACT NOTE (OTHER) - ACTION/TREATMENT ORDERED:
Will retake temperature in one hour as per NP.
Albumin humn 5% IVPB ordered.
provider Julián made aware. recheck BP in 1 hour
Kaylan Farias notified; RRT called.

## 2022-11-10 NOTE — PROVIDER CONTACT NOTE (OTHER) - SITUATION
Pt has T100.3 and -786. Standing 6pm tylenol given.
Hypotensive & 10/10 left flank pain
BP 82/42, , T:99.5F, RR:17, O2:92% on RA. Patient has no complaints of dizziness, light headness, SOB, chest pain.
patient's BP is 98/54.

## 2022-11-10 NOTE — PROVIDER CONTACT NOTE (OTHER) - ASSESSMENT
Pt denies chest pain, denies shortness of breath. /49, sating 95-96% on room air. No distress noted.

## 2022-11-11 NOTE — PROGRESS NOTE ADULT - ASSESSMENT
ASSESSMENT:  This is a 82 yo M with complex abdominal wound, being managed with vac/local wound care    Plan  - Will d/c vac today, dressing with adaptic and abd pad  - Wound bed healing appropriately  - Remainder of care and oncologic workup per medicine and heme/onc  - ok to start chemotherapy at any point, no contraindication from PRS perspective  - Plan discussed with Dr. Gallegos

## 2022-11-11 NOTE — PROGRESS NOTE ADULT - SUBJECTIVE AND OBJECTIVE BOX
Merlin Mathew, MD   Hospitalist  Pager #[phone]    PROGRESS NOTE:     Patient is a 81y old  Male who presents with a chief complaint of Postoperative wound bleed (2022 07:46)      SUBJECTIVE / OVERNIGHT EVENTS: Patient hypotensive and febrile last night   This AM, denies any lightheadedness, F/C, headaches, sinus pain/congestion, rhinorrhea, cough, SOB, CP, N/V.   Diarrhea has resolved   No rashes   Abdominal wound healing well     ADDITIONAL REVIEW OF SYSTEMS:    MEDICATIONS  (STANDING):  acetaminophen     Tablet .. 650 milliGRAM(s) Oral every 6 hours  ALPRAZolam 0.25 milliGRAM(s) Oral at bedtime  aMIOdarone    Tablet 200 milliGRAM(s) Oral daily  DULoxetine 60 milliGRAM(s) Oral daily  gabapentin 100 milliGRAM(s) Oral three times a day  influenza  Vaccine (HIGH DOSE) 0.7 milliLiter(s) IntraMuscular once  levothyroxine 25 MICROGram(s) Oral daily  metoprolol succinate ER 50 milliGRAM(s) Oral daily  pancrelipase  (CREON 24,000 Lipase Units) 3 Capsule(s) Oral three times a day with meals  pantoprazole    Tablet 40 milliGRAM(s) Oral before breakfast  piperacillin/tazobactam IVPB.. 3.375 Gram(s) IV Intermittent every 8 hours  sacubitril 49 mG/valsartan 51 mG 1 Tablet(s) Oral two times a day  sodium chloride 0.9%. 1000 milliLiter(s) (100 mL/Hr) IV Continuous <Continuous>  tamsulosin 0.4 milliGRAM(s) Oral at bedtime    MEDICATIONS  (PRN):  artificial tears (preservative free) Ophthalmic Solution 1 Drop(s) Both EYES every 2 hours PRN Dry Eyes  diphenhydrAMINE 25 milliGRAM(s) Oral every 6 hours PRN Rash and/or Itching  LORazepam   Injectable 0.25 milliGRAM(s) IV Push every 6 hours PRN Nausea and/or Vomiting  melatonin 3 milliGRAM(s) Oral at bedtime PRN Insomnia  oxyCODONE    IR 5 milliGRAM(s) Oral every 4 hours PRN moderate to severe pain  simethicone 80 milliGRAM(s) Chew three times a day PRN Gas      CAPILLARY BLOOD GLUCOSE      POCT Blood Glucose.: 167 mg/dL (2022 11:22)  POCT Blood Glucose.: 144 mg/dL (2022 05:37)  POCT Blood Glucose.: 98 mg/dL (2022 00:31)  POCT Blood Glucose.: 106 mg/dL (10 Nov 2022 18:07)  POCT Blood Glucose.: 115 mg/dL (10 Nov 2022 14:17)    I&O's Summary    2022 07:01  -  2022 14:00  --------------------------------------------------------  IN: 0 mL / OUT: 350 mL / NET: -350 mL        PHYSICAL EXAM:  Vital Signs Last 24 Hrs  T(C): 36.8 (2022 13:21), Max: 38.6 (2022 00:24)  T(F): 98.2 (:), Max: 101.4 (2022 00:24)  HR: 96 (:21) (73 - 119)  BP: 99/36 (2022 13:21) (70/36 - 114/88)  BP(mean): 74 (10 Nov 2022 14:15) (74 - 74)  RR: 18 (2022 13:21) (16 - 18)  SpO2: 96% (:21) (92% - 100%)    Parameters below as of 2022 13:21  Patient On (Oxygen Delivery Method): room air      CONSTITUTIONAL: NAD, well-developed male   RESPIRATORY: Normal respiratory effort; lungs are clear to auscultation bilaterally  CARDIOVASCULAR: Regular rate and rhythm, normal S1 and S2, no murmur/rub/gallop; No lower extremity edema; Peripheral pulses are 2+ bilaterally  ABDOMEN: Midline abdominal wound with dressing in place, tender to palpation; normoactive bowel sounds, no rebound/guarding; No hepatosplenomegaly  MUSCULOSKELETAL no clubbing or cyanosis of digits; no joint swelling or tenderness to palpation  PSYCH: A+O to person, place, and time; affect appropriate    LABS:                        7.8    5.13  )-----------( 162      ( 2022 10:21 )             23.2         131<L>  |  99  |  18  ----------------------------<  93  3.9   |  19<L>  |  0.87    Ca    8.5      2022 00:54  Phos  2.9       Mg     1.60         TPro  6.1  /  Alb  2.7<L>  /  TBili  0.8  /  DBili  x   /  AST  28  /  ALT  19  /  AlkPhos  135<H>      PT/INR - ( 2022 00:54 )   PT: 18.4 sec;   INR: 1.58 ratio         PTT - ( 2022 00:54 )  PTT:31.3 sec      Urinalysis Basic - ( 2022 23:40 )    Color: Yellow / Appearance: Slightly Turbid / S.032 / pH: x  Gluc: x / Ketone: Trace  / Bili: Negative / Urobili: 3 mg/dL   Blood: x / Protein: 30 mg/dL / Nitrite: Negative   Leuk Esterase: Small / RBC: 4 /HPF / WBC 5 /HPF   Sq Epi: x / Non Sq Epi: 5 /HPF / Bacteria: Few        Culture - Urine (collected 2022 23:40)  Source: Clean Catch Clean Catch (Midstream)  Final Report (10 Nov 2022 22:57):    <10,000 CFU/mL Normal Urogenital Juani    Culture - Stool (collected 2022 00:00)  Source: .Stool Feces  Final Report (2022 13:40):    No enteric pathogens isolated.    (Stool culture examined for Salmonella,    Shigella, Campylobacter, Aeromonas, Plesiomonas,    Vibrio, E.coli O157 and Yersinia)        RADIOLOGY & ADDITIONAL TESTS:  Results Reviewed:   Imaging Personally Reviewed:  Electrocardiogram Personally Reviewed:    COORDINATION OF CARE:  Care Discussed with Consultants/Other Providers [Y/N]:  Prior or Outpatient Records Reviewed [Y/N]:

## 2022-11-11 NOTE — PROGRESS NOTE ADULT - SUBJECTIVE AND OBJECTIVE BOX
Plastic Surgery Progress Note (pg [facility]: [id number], [facility]: [phone])    SUBJECTIVE  The patient was seen and examined. Endorses no complaints.    OBJECTIVE  ___________________________________________________  VITAL SIGNS / I&O's   Vital Signs Last 24 Hrs  T(C): 37.1 (2022 05:19), Max: 38.6 (2022 00:24)  T(F): 98.8 (2022 05:19), Max: 101.4 (2022 00:24)  HR: 103 (2022 05:19) (73 - 119)  BP: 83/43 (2022 05:19) (70/36 - 125/40)  BP(mean): 74 (10 Nov 2022 14:15) (57 - 74)  RR: 17 (2022 05:19) (16 - 20)  SpO2: 94% (2022 05:19) (92% - 100%)    Parameters below as of 2022 05:19  Patient On (Oxygen Delivery Method): room air          ___________________________________________________  PHYSICAL EXAM     CONSTITUTIONAL: NAD, lying in bed  -- NEURO: Awake, alert  -- CARD: hemodynamically stable  -- PULM: Non-labored respirations  -- ABDOMEN: vac in place holding suction with serous output        ___________________________________________________  LABS                        7.4    8.63  )-----------( 204      ( 2022 00:54 )             22.7     2022 00:54    131    |  99     |  18     ----------------------------<  93     3.9     |  19     |  0.87     Ca    8.5        2022 00:54  Phos  2.9       2022 00:54  Mg     1.60      2022 00:54    TPro  6.1    /  Alb  2.7    /  TBili  0.8    /  DBili  x      /  AST  28     /  ALT  19     /  AlkPhos  135    2022 20:05    PT/INR - ( 2022 00:54 )   PT: 18.4 sec;   INR: 1.58 ratio         PTT - ( 2022 00:54 )  PTT:31.3 sec  CAPILLARY BLOOD GLUCOSE      POCT Blood Glucose.: 144 mg/dL (2022 05:37)  POCT Blood Glucose.: 98 mg/dL (2022 00:31)  POCT Blood Glucose.: 106 mg/dL (10 Nov 2022 18:07)  POCT Blood Glucose.: 115 mg/dL (10 Nov 2022 14:17)  POCT Blood Glucose.: 121 mg/dL (10 Nov 2022 11:36)        Urinalysis Basic - ( 2022 23:40 )    Color: Yellow / Appearance: Slightly Turbid / S.032 / pH: x  Gluc: x / Ketone: Trace  / Bili: Negative / Urobili: 3 mg/dL   Blood: x / Protein: 30 mg/dL / Nitrite: Negative   Leuk Esterase: Small / RBC: 4 /HPF / WBC 5 /HPF   Sq Epi: x / Non Sq Epi: 5 /HPF / Bacteria: Few      ___________________________________________________  MICRO  Recent Cultures:  Specimen Source: Clean Catch Clean Catch (Midstream),  @ 23:40; Results   <10,000 CFU/mL Normal Urogenital Juani; Gram Stain: --; Organism: --  Specimen Source: .Stool Feces,  @ 00:00; Results   No enteric pathogens to date: Final culture pending; Gram Stain: --; Organism: --    ___________________________________________________  MEDICATIONS  (STANDING):  acetaminophen     Tablet .. 650 milliGRAM(s) Oral every 6 hours  ALPRAZolam 0.25 milliGRAM(s) Oral at bedtime  aMIOdarone    Tablet 200 milliGRAM(s) Oral daily  calamine/zinc oxide Lotion 1 Application(s) Topical once  DULoxetine 60 milliGRAM(s) Oral daily  gabapentin 100 milliGRAM(s) Oral three times a day  influenza  Vaccine (HIGH DOSE) 0.7 milliLiter(s) IntraMuscular once  levothyroxine 25 MICROGram(s) Oral daily  metoprolol succinate ER 50 milliGRAM(s) Oral daily  pancrelipase  (CREON 24,000 Lipase Units) 3 Capsule(s) Oral three times a day with meals  pantoprazole    Tablet 40 milliGRAM(s) Oral before breakfast  piperacillin/tazobactam IVPB.- 3.375 Gram(s) IV Intermittent once  piperacillin/tazobactam IVPB.. 3.375 Gram(s) IV Intermittent every 8 hours  sacubitril 49 mG/valsartan 51 mG 1 Tablet(s) Oral two times a day  tamsulosin 0.4 milliGRAM(s) Oral at bedtime    MEDICATIONS  (PRN):  artificial tears (preservative free) Ophthalmic Solution 1 Drop(s) Both EYES every 2 hours PRN Dry Eyes  diphenhydrAMINE 25 milliGRAM(s) Oral every 6 hours PRN Rash and/or Itching  LORazepam   Injectable 0.25 milliGRAM(s) IV Push every 6 hours PRN Nausea and/or Vomiting  melatonin 3 milliGRAM(s) Oral at bedtime PRN Insomnia  oxyCODONE    IR 5 milliGRAM(s) Oral every 4 hours PRN moderate to severe pain  simethicone 80 milliGRAM(s) Chew three times a day PRN Gas

## 2022-11-11 NOTE — CHART NOTE - NSCHARTNOTEFT_GEN_A_CORE
Spoke with medical attending Dr. Shrestha regarding restarting patient on anti-coagulation. As per Dr. Shrestha, hold Heparin and restart patient on Lovenox 85mg BID with plan to transition patient to DOAC.

## 2022-11-11 NOTE — CHART NOTE - NSCHARTNOTEFT_GEN_A_CORE
[facility] NIGHT MEDICINE COVERAGE.    Notified by RN, pt SBP remains in the 80s, recent BP resulting in a MAP of 53. Pt remains asymptomatic, without complaints of dizziness, HA, SOB, or CP and mentating well. Pt has received 250cc of 5% Albumin, along with sepsis work-up for Tmax 101.4F(rectal), started on empiric antibiotics: Vanco/Zosyn x 1. Per chart review, patient was RRT 11/9 for hypotension SBP 80s, pt presented the same way, and at that time patient received NS bolus with improvement. Pt Hgb also noted to be downtrending from 7.8 to 7.4. Above events and hospital course discussed with Hospitalist, Dr. ABELINO Oropeza, recommends Midodrine 10mg PO x1 and additional 250cc bolus. Will repeat BP 1hr post midodrine. RN called and made aware of plan. Will continue to monitor.     - Febrile--- Tmax 101.4F(r)  - Vanco/Zosyn x 1 ordered  - Lactate 1.9  - CXR -- preliminary-- poor study d/t pt position, LLL opacity ??likely represents a combination of infection, effusion, and atelectasis   - Midodrine 10mg PO x1 & 250cc NS over 2hrs ordered.    [ ] f/u rpt BP s/p Midodrine and bolus  [ ] f/u rpt CXR (poor study last night)  [ ] BCx (sent)    Wendy GARCÍA  Medicine [id number]

## 2022-11-11 NOTE — PROGRESS NOTE ADULT - ASSESSMENT
80 yo M w/ HTN, HLD, HFrEF (EF 30-35%), VT, s/p ACID, AS s/p TAVR, CAD s/p stents, GERD, hypothyroid, anxiety, pancreatic ca s/p whipple 10/26/21, chemo and xrt with non-healing abdominal wound treated with recent admission/discharge on 9/29  p/w bleeding from abdominal incision site with CT abd shows- New portal venous thrombosis involving the main, right, and left portal veins. The portal splenic confluence and central splenic vein are not well visualized and may be thrombosed. Status post Whipple procedure. Question ill-defined soft tissue encasing and obliterating the proximal main portal vein and portal splenic confluence, and encasing the hepatic artery, raising the possibility of locally recurrent disease. IR initially consulted but no percutaneous window for Biopsy. [facility] called for evaluation for possible EUS w/ biopsy given concern for recurrence.     #Hx of pancreatic adenocarcinoma s/p whipple, chemo and radiation  #Non-healing abdominal wound  #Acute portal vein thrombosis   #Questionable ill-defined soft tissue encasing/obliterating portal vein and hepatic artery  S/p EUS on 11/10 with biopsy obtained, pathology pending.     #Fever:  -Post procedure, possible translocation during procedure. Will need to repeat infectious work up.     Recommendations:  -Advance diet as tolerated  -Repeat Infectious work up (blood culture, CXR, UA, etc)  -Agree with empiric abx at this time    Recommendations preliminary until signed by attending.     Shay Crespo MD  Gastroenterology/Hepatology Fellow  1st option: [phone] (text or call), ONLY available from 7:00 am to 5:00 pm.   **Contact on-call GI fellow via answering service ([phone]) from 5pm-7am AND on weekends/holidays**  2nd option: Available via Microsoft Teams  3rd option: Pager: [phone]

## 2022-11-11 NOTE — CHART NOTE - NSCHARTNOTEFT_GEN_A_CORE
Per chart--82 yo M w/ HTN, HLD, HFrEF (EF 30-35%), VT, s/p ACID, AS s/p TAVR, CAD s/p stents, GERD, hypothyroid, anxiety, pancreatic ca s/p whipple 10/26/21, chemo and xrt with non-healing abdominal wound treated w/ HBOT and STSG 7/8/22, s/p repeat STSG wound vac placement on 9/29, p/w bleeding from abdominal incision site now s/p new wound vac this admission.    Nutrition follow up for consult received. Per Attending, concerns for malnutrition and wound healing. Per review of EMR, Pt is S/P RRT 11/9 for Hypotension, Ecoli (+), Stool Cx/Cdiff (-), Wound Vac to midline now discontinued and is S/P EUS 11/10. Pt currently on Clear Liquids diet, per Attending possibility of advancing diet, awaiting GI recs. Spoke with Pt and Primary RN. Pt reports good appetite and has been completing 100% of Clear liquids diet. Denies chewing, swallowing difficulties or any nausea, vomiting. Had one episode of diarrhea 11/8, denies anything since. Pt was previously receiving Ensure Plus High Protein per Nursing with good intakes. Will note, Pt is observed with severe bi-temporal and clavicle muscle wasting. Diet orders have been between Clear Liquids and NPO, resulting in </50% of estimated energy requirement for >/5 days. Now meets criteria for severe malnutrition. While on Clear liquids will suggest add Ensure Clear supplement. Would consider liberalized diet when advanced considering current clinical status. Monitor POCT, if elevated cover with Insulin as needed.     DIET : Diet, Clear Liquid:   Consistent Carbohydrate {Evening Snack} (CSTCHOSN)  No Concentrated Potassium  No Concentrated Phosphorus  Low Sodium (11-09-22 @ 17:09)    WEIGHT: Weight (kg): 86 (11/11) 88.8 (11/10) 86 (admit weight) 89 kg     PERTINENT MEDICATIONS: MEDICATIONS  (STANDING):  acetaminophen     Tablet .. 650 milliGRAM(s) Oral every 6 hours  ALPRAZolam 0.25 milliGRAM(s) Oral at bedtime  aMIOdarone    Tablet 200 milliGRAM(s) Oral daily  calamine/zinc oxide Lotion 1 Application(s) Topical once  DULoxetine 60 milliGRAM(s) Oral daily  gabapentin 100 milliGRAM(s) Oral three times a day  influenza  Vaccine (HIGH DOSE) 0.7 milliLiter(s) IntraMuscular once  levothyroxine 25 MICROGram(s) Oral daily  metoprolol succinate ER 50 milliGRAM(s) Oral daily  pancrelipase  (CREON 24,000 Lipase Units) 3 Capsule(s) Oral three times a day with meals  pantoprazole    Tablet 40 milliGRAM(s) Oral before breakfast  piperacillin/tazobactam IVPB.. 3.375 Gram(s) IV Intermittent every 8 hours  sacubitril 49 mG/valsartan 51 mG 1 Tablet(s) Oral two times a day  tamsulosin 0.4 milliGRAM(s) Oral at bedtime    MEDICATIONS  (PRN):  artificial tears (preservative free) Ophthalmic Solution 1 Drop(s) Both EYES every 2 hours PRN Dry Eyes  diphenhydrAMINE 25 milliGRAM(s) Oral every 6 hours PRN Rash and/or Itching  LORazepam   Injectable 0.25 milliGRAM(s) IV Push every 6 hours PRN Nausea and/or Vomiting  melatonin 3 milliGRAM(s) Oral at bedtime PRN Insomnia  oxyCODONE    IR 5 milliGRAM(s) Oral every 4 hours PRN moderate to severe pain  simethicone 80 milliGRAM(s) Chew three times a day PRN Gas    LABS:  11-11 Na131 mmol/L<L> Glu 93 mg/dL K+ 3.9 mmol/L Cr  0.87 mg/dL BUN 18 mg/dL 11-11 Phos 2.9 mg/dL 11-09 Alb 2.7 g/dL<L>    CAPILLARY BLOOD GLUCOSE  POCT Blood Glucose.: 144 mg/dL (11 Nov 2022 05:37)  POCT Blood Glucose.: 98 mg/dL (11 Nov 2022 00:31)  POCT Blood Glucose.: 106 mg/dL (10 Nov 2022 18:07)  POCT Blood Glucose.: 115 mg/dL (10 Nov 2022 14:17)  POCT Blood Glucose.: 121 mg/dL (10 Nov 2022 11:36)    SKIN: L Thigh Graft, Midline abdominal surgical incision    EDEMA: no edema per Nursing flow sheet    Nutrient Needs:  [X] No Change from Initial Assessment.           New Nutrition Dx.  Pt meets criteria for severe malnutrition in the setting of acute/chronic illness.   Severe muscle wasting, </50% of estimated energy requirement for >/5 days.                                     ~~~~~RECOMMEND~~~~~  1.  Add Ensure Clear 3x daily while remaining on Current Clear Liquids diet. Would D/C other therapeutic restrictions (Phos/Potassium restrictions).   2.  When able to advance diet, suggest Low Na diet with Ensure Plus High Protein (HP) 3x daily.  3.  Monitor PO intake, diet tolerance, skin integrity and pertinent labs.    4.  Recommend daily multivitamin supplement.    BHUMIKA Live RD, CDN, [facility]

## 2022-11-11 NOTE — PROVIDER CONTACT NOTE (SEPSIS SCREENING) - ACTION/TREATMENT ORDERED:
Motrin 400mg, ice packs axillary and groin, possibly antibiotics pending blood culture results, STAT labs.

## 2022-11-11 NOTE — PROGRESS NOTE ADULT - SUBJECTIVE AND OBJECTIVE BOX
Interval Events:   Patient denies any abdominal pain, nausea /vomiting, diarrhea or melena / bloody bm.     Hospital Medications:  acetaminophen     Tablet .. 650 milliGRAM(s) Oral every 6 hours  ALPRAZolam 0.25 milliGRAM(s) Oral at bedtime  aMIOdarone    Tablet 200 milliGRAM(s) Oral daily  artificial tears (preservative free) Ophthalmic Solution 1 Drop(s) Both EYES every 2 hours PRN  calamine/zinc oxide Lotion 1 Application(s) Topical once  diphenhydrAMINE 25 milliGRAM(s) Oral every 6 hours PRN  DULoxetine 60 milliGRAM(s) Oral daily  gabapentin 100 milliGRAM(s) Oral three times a day  influenza  Vaccine (HIGH DOSE) 0.7 milliLiter(s) IntraMuscular once  levothyroxine 25 MICROGram(s) Oral daily  LORazepam   Injectable 0.25 milliGRAM(s) IV Push every 6 hours PRN  melatonin 3 milliGRAM(s) Oral at bedtime PRN  metoprolol succinate ER 50 milliGRAM(s) Oral daily  oxyCODONE    IR 5 milliGRAM(s) Oral every 4 hours PRN  pancrelipase  (CREON 24,000 Lipase Units) 3 Capsule(s) Oral three times a day with meals  pantoprazole    Tablet 40 milliGRAM(s) Oral before breakfast  piperacillin/tazobactam IVPB.- 3.375 Gram(s) IV Intermittent once  piperacillin/tazobactam IVPB.. 3.375 Gram(s) IV Intermittent every 8 hours  sacubitril 49 mG/valsartan 51 mG 1 Tablet(s) Oral two times a day  simethicone 80 milliGRAM(s) Chew three times a day PRN  tamsulosin 0.4 milliGRAM(s) Oral at bedtime      ROS: All system reviewed and negative except as mentioned above.    PHYSICAL EXAM:   Vital Signs:  Vital Signs Last 24 Hrs  T(C): 37.1 (2022 05:19), Max: 38.6 (2022 00:24)  T(F): 98.8 (2022 05:19), Max: 101.4 (2022 00:24)  HR: 103 (2022 05:19) (73 - 119)  BP: 83/43 (2022 05:19) (70/36 - 125/40)  BP(mean): 74 (10 Nov 2022 14:15) (57 - 74)  RR: 17 (2022 05:19) (16 - 20)  SpO2: 94% (2022 05:19) (92% - 100%)    Parameters below as of 2022 05:19  Patient On (Oxygen Delivery Method): room air      Daily Height in cm: 185.4 (10 Nov 2022 08:53)    Daily Weight in k.8 (2022 01:43)    General: WDWN male resting in bed   HEENT: NC/AT; PERRL, anicteric sclera; MMM  Neck: supple  Cardiovascular: +S1/S2; RRR  Respiratory: CTA B/L; no W/R/R  Gastrointestinal: soft, NT/ND; +BSx4. Midline wound vac in place.   Extremities: WWP; no edema, clubbing or cyanosis  Vascular: 2+ radial, DP/PT pulses B/L  Neurological: AAOx3; no focal deficits    LABS:                        7.4    8.63  )-----------( 204      ( 2022 00:54 )             22.7     Mean Cell Volume: 89.0 fL (22 @ 00:54)        131<L>  |  99  |  18  ----------------------------<  93  3.9   |  19<L>  |  0.87    Ca    8.5      2022 00:54  Phos  2.9       Mg     1.60         TPro  6.1  /  Alb  2.7<L>  /  TBili  0.8  /  DBili  x   /  AST  28  /  ALT  19  /  AlkPhos  135<H>      LIVER FUNCTIONS - ( 2022 20:05 )  Alb: 2.7 g/dL / Pro: 6.1 g/dL / ALK PHOS: 135 U/L / ALT: 19 U/L / AST: 28 U/L / GGT: x           PT/INR - ( 2022 00:54 )   PT: 18.4 sec;   INR: 1.58 ratio         PTT - ( 2022 00:54 )  PTT:31.3 sec  Urinalysis Basic - ( 2022 23:40 )    Color: Yellow / Appearance: Slightly Turbid / S.032 / pH: x  Gluc: x / Ketone: Trace  / Bili: Negative / Urobili: 3 mg/dL   Blood: x / Protein: 30 mg/dL / Nitrite: Negative   Leuk Esterase: Small / RBC: 4 /HPF / WBC 5 /HPF   Sq Epi: x / Non Sq Epi: 5 /HPF / Bacteria: Few                              7.4    8.63  )-----------( 204      ( 2022 00:54 )             22.7                         7.8    4.26  )-----------( 209      ( 10 Nov 2022 05:48 )             24.5                         8.5    4.98  )-----------( 228      ( 2022 21:00 )             26.4                         8.5    6.09  )-----------( 227      ( 2022 20:05 )             27.0                         8.7    4.99  )-----------( 204      ( 2022 04:30 )             27.0       Imaging: Images reviewed.

## 2022-11-11 NOTE — PROGRESS NOTE ADULT - ATTENDING COMMENTS
Planned for EUS-FNB today of concern for recurrence of malignancy. INR elevated > 3, so will post-pone procedure until patient is optimized to decrease bleeding risk.
Agree with above  s/p EUS-FNA of soft tissue nodularity by the confluence and FNA of ascites fluid. Cytology pending
Patient seen and examined with the GI fellow. I agree with the above assessment and plan. Thank you for allowing us to care for your patient.
Patient seen and examined with the GI fellow. I agree with the above assessment and plan. Thank you for allowing us to care for your patient.    EUS yesterday with food in the stomach.      Plan for NGT decompression and repeat EUS tomorrow.

## 2022-11-12 NOTE — PROGRESS NOTE ADULT - ASSESSMENT
80 yo M w/ HTN, HLD, HFrEF (EF 30-35%), VT, s/p ACID, AS s/p TAVR, CAD s/p stents, GERD, hypothyroid, anxiety, pancreatic ca s/p whipple 10/26/21, chemo and xrt with non-healing abdominal wound treated w/ HBOT and STSG 7/8/22, s/p repeat STSG wound vac placement on 9/29, p/w bleeding from abdominal incision site now s/p new wound vac this admission now s/p removal. Course c/b EUS s/p bx of lesion, post-procedural fever now on Zosyn.

## 2022-11-12 NOTE — PROGRESS NOTE ADULT - SUBJECTIVE AND OBJECTIVE BOX
Merlin Mathew, MD   Hospitalist  Pager #[phone]    PROGRESS NOTE:     Patient is a 81y old  Male who presents with a chief complaint of Postoperative wound bleed (11 Nov 2022 13:59)      SUBJECTIVE / OVERNIGHT EVENTS: NEON   Patient feels well, has some more abdominal pain overnight but did not ask for pain medication   Is nauseous this AM   Denies any F/C, diarrhea     ADDITIONAL REVIEW OF SYSTEMS:    MEDICATIONS  (STANDING):  acetaminophen     Tablet .. 650 milliGRAM(s) Oral every 6 hours  ALPRAZolam 0.25 milliGRAM(s) Oral at bedtime  aMIOdarone    Tablet 200 milliGRAM(s) Oral daily  DULoxetine 60 milliGRAM(s) Oral daily  enoxaparin Injectable 85 milliGRAM(s) SubCutaneous every 12 hours  gabapentin 100 milliGRAM(s) Oral three times a day  influenza  Vaccine (HIGH DOSE) 0.7 milliLiter(s) IntraMuscular once  levothyroxine 25 MICROGram(s) Oral daily  metoprolol succinate ER 50 milliGRAM(s) Oral daily  pancrelipase  (CREON 24,000 Lipase Units) 3 Capsule(s) Oral three times a day with meals  pantoprazole    Tablet 40 milliGRAM(s) Oral before breakfast  piperacillin/tazobactam IVPB.. 3.375 Gram(s) IV Intermittent every 8 hours  sacubitril 49 mG/valsartan 51 mG 1 Tablet(s) Oral two times a day  tamsulosin 0.4 milliGRAM(s) Oral at bedtime    MEDICATIONS  (PRN):  artificial tears (preservative free) Ophthalmic Solution 1 Drop(s) Both EYES every 2 hours PRN Dry Eyes  diphenhydrAMINE 25 milliGRAM(s) Oral every 6 hours PRN Rash and/or Itching  LORazepam   Injectable 0.25 milliGRAM(s) IV Push every 6 hours PRN Nausea and/or Vomiting  melatonin 3 milliGRAM(s) Oral at bedtime PRN Insomnia  oxyCODONE    IR 5 milliGRAM(s) Oral every 4 hours PRN moderate to severe pain  simethicone 80 milliGRAM(s) Chew three times a day PRN Gas      CAPILLARY BLOOD GLUCOSE      POCT Blood Glucose.: 140 mg/dL (12 Nov 2022 11:33)  POCT Blood Glucose.: 143 mg/dL (12 Nov 2022 00:34)  POCT Blood Glucose.: 189 mg/dL (11 Nov 2022 18:06)    I&O's Summary    11 Nov 2022 07:01  -  12 Nov 2022 07:00  --------------------------------------------------------  IN: 0 mL / OUT: 350 mL / NET: -350 mL    12 Nov 2022 07:01  -  12 Nov 2022 15:01  --------------------------------------------------------  IN: 0 mL / OUT: 250 mL / NET: -250 mL        PHYSICAL EXAM:  Vital Signs Last 24 Hrs  T(C): 37.2 (12 Nov 2022 13:07), Max: 37.2 (12 Nov 2022 13:07)  T(F): 98.9 (12 Nov 2022 13:07), Max: 98.9 (12 Nov 2022 13:07)  HR: 85 (12 Nov 2022 13:07) (82 - 91)  BP: 124/65 (12 Nov 2022 13:07) (100/50 - 133/71)  BP(mean): --  RR: 16 (12 Nov 2022 13:07) (16 - 18)  SpO2: 95% (12 Nov 2022 13:07) (95% - 99%)    Parameters below as of 12 Nov 2022 13:07  Patient On (Oxygen Delivery Method): room air    CONSTITUTIONAL: NAD, well-developed male   RESPIRATORY: Normal respiratory effort; lungs are clear to auscultation bilaterally  CARDIOVASCULAR: Regular rate and rhythm, normal S1 and S2, no murmur/rub/gallop; No lower extremity edema; Peripheral pulses are 2+ bilaterally  ABDOMEN: Midline abdominal wound with dressing in place, tender to palpation; normoactive bowel sounds, no rebound/guarding; No hepatosplenomegaly  MUSCULOSKELETAL no clubbing or cyanosis of digits; no joint swelling or tenderness to palpation  PSYCH: A+O to person, place, and time; affect appropriate    LABS:                        7.3    3.33  )-----------( 164      ( 12 Nov 2022 06:01 )             22.7     11-12    131<L>  |  102  |  19  ----------------------------<  146<H>  3.6   |  20<L>  |  0.77    Ca    8.3<L>      12 Nov 2022 06:01  Phos  2.7     11-12  Mg     1.60     11-12      PT/INR - ( 11 Nov 2022 00:54 )   PT: 18.4 sec;   INR: 1.58 ratio         PTT - ( 11 Nov 2022 00:54 )  PTT:31.3 sec          Culture - Blood (collected 10 Nov 2022 23:00)  Source: .Blood Blood-Peripheral  Preliminary Report (12 Nov 2022 03:02):    No growth to date.    Culture - Blood (collected 10 Nov 2022 21:30)  Source: .Blood Blood-Venous  Preliminary Report (12 Nov 2022 07:01):    No growth to date.    Culture - Urine (collected 09 Nov 2022 23:40)  Source: Clean Catch Clean Catch (Midstream)  Final Report (10 Nov 2022 22:57):    <10,000 CFU/mL Normal Urogenital Juani        RADIOLOGY & ADDITIONAL TESTS:  Results Reviewed:   Imaging Personally Reviewed:  Electrocardiogram Personally Reviewed:    COORDINATION OF CARE:  Care Discussed with Consultants/Other Providers [Y/N]:  Prior or Outpatient Records Reviewed [Y/N]:

## 2022-11-13 NOTE — PROGRESS NOTE ADULT - SUBJECTIVE AND OBJECTIVE BOX
INTERVAL HPI/OVERNIGHT EVENTS:  Patient seen at bedside.  Mild abdominal pain, no other complaints at present time    VITAL SIGNS:  T(F): 98.5 (11-04-22 @ 13:49)  HR: 90 (11-04-22 @ 13:49)  BP: 93/51 (11-04-22 @ 13:49)  RR: 17 (11-04-22 @ 13:49)  SpO2: 100% (11-04-22 @ 13:49)  Wt(kg): --    PHYSICAL EXAM:    Constitutional: NAD, resting in bed comfortably  Eyes: EOMI, sclera non-icteric  Neck: supple, no LAP  Respiratory: CTA b/l, good air entry b/l, no wheezing, rhonchi or crackels  Cardiovascular: RRR, normal S1S2, no M/R/G  Gastrointestinal: soft, NTND  Extremities: no edema  Neurological: AAOx3, non focal  Skin: Normal temperature    MEDICATIONS  (STANDING):  acetaminophen     Tablet .. 650 milliGRAM(s) Oral every 6 hours  aMIOdarone    Tablet 200 milliGRAM(s) Oral daily  DULoxetine 60 milliGRAM(s) Oral daily  gabapentin 100 milliGRAM(s) Oral three times a day  influenza  Vaccine (HIGH DOSE) 0.7 milliLiter(s) IntraMuscular once  levothyroxine 25 MICROGram(s) Oral daily  metoprolol succinate ER 50 milliGRAM(s) Oral daily  pancrelipase  (CREON 24,000 Lipase Units) 3 Capsule(s) Oral three times a day with meals  pantoprazole    Tablet 40 milliGRAM(s) Oral before breakfast  sacubitril 49 mG/valsartan 51 mG 1 Tablet(s) Oral two times a day  tamsulosin 0.4 milliGRAM(s) Oral at bedtime    MEDICATIONS  (PRN):  artificial tears (preservative free) Ophthalmic Solution 1 Drop(s) Both EYES every 2 hours PRN Dry Eyes  diphenhydrAMINE 25 milliGRAM(s) Oral every 6 hours PRN Rash and/or Itching  melatonin 3 milliGRAM(s) Oral at bedtime PRN Insomnia  metoclopramide Injectable 10 milliGRAM(s) IV Push every 4 hours PRN Nausea and/or Vomiting  ondansetron Injectable 8 milliGRAM(s) IV Push every 8 hours PRN Nausea and/or Vomiting  simethicone 80 milliGRAM(s) Chew three times a day PRN Gas      Allergies    No Known Allergies    Intolerances        LABS:                        8.1    3.95  )-----------( 153      ( 04 Nov 2022 03:36 )             25.5     11-04    134<L>  |  100  |  17  ----------------------------<  142<H>  4.4   |  23  |  0.66    Ca    8.7      04 Nov 2022 03:36  Phos  3.1     11-04  Mg     1.70     11-04    TPro  6.7  /  Alb  3.2<L>  /  TBili  0.6  /  DBili  x   /  AST  18  /  ALT  14  /  AlkPhos  110  11-04    PT/INR - ( 04 Nov 2022 03:36 )   PT: 40.3 sec;   INR: 3.43 ratio         PTT - ( 04 Nov 2022 03:36 )  PTT:42.2 sec      RADIOLOGY & ADDITIONAL TESTS:  Studies reviewed.

## 2022-11-13 NOTE — PROGRESS NOTE ADULT - SUBJECTIVE AND OBJECTIVE BOX
[facility]  Division of Hospital Medicine  Paula Weiner MD  Pager: [phone]      Patient is a 81y old  Male who presents with a chief complaint of Postoperative wound bleed (13 Nov 2022 08:50)      SUBJECTIVE / OVERNIGHT EVENTS: Patient examined at bedside. Denies pain + BM. No other medical concerns at this time.   ADDITIONAL REVIEW OF SYSTEMS:    MEDICATIONS  (STANDING):  acetaminophen     Tablet .. 650 milliGRAM(s) Oral every 6 hours  ALPRAZolam 0.25 milliGRAM(s) Oral at bedtime  aMIOdarone    Tablet 200 milliGRAM(s) Oral daily  DULoxetine 60 milliGRAM(s) Oral daily  enoxaparin Injectable 85 milliGRAM(s) SubCutaneous every 12 hours  gabapentin 100 milliGRAM(s) Oral three times a day  influenza  Vaccine (HIGH DOSE) 0.7 milliLiter(s) IntraMuscular once  levothyroxine 25 MICROGram(s) Oral daily  metoprolol succinate ER 50 milliGRAM(s) Oral daily  pancrelipase  (CREON 24,000 Lipase Units) 3 Capsule(s) Oral three times a day with meals  pantoprazole    Tablet 40 milliGRAM(s) Oral before breakfast  piperacillin/tazobactam IVPB.. 3.375 Gram(s) IV Intermittent every 8 hours  sacubitril 49 mG/valsartan 51 mG 1 Tablet(s) Oral two times a day  tamsulosin 0.4 milliGRAM(s) Oral at bedtime    MEDICATIONS  (PRN):  artificial tears (preservative free) Ophthalmic Solution 1 Drop(s) Both EYES every 2 hours PRN Dry Eyes  diphenhydrAMINE 25 milliGRAM(s) Oral every 6 hours PRN Rash and/or Itching  LORazepam   Injectable 0.25 milliGRAM(s) IV Push every 6 hours PRN Nausea and/or Vomiting  melatonin 3 milliGRAM(s) Oral at bedtime PRN Insomnia  oxyCODONE    IR 5 milliGRAM(s) Oral every 4 hours PRN moderate to severe pain  simethicone 80 milliGRAM(s) Chew three times a day PRN Gas      CAPILLARY BLOOD GLUCOSE      POCT Blood Glucose.: 117 mg/dL (12 Nov 2022 16:24)    I&O's Summary    12 Nov 2022 07:01  -  13 Nov 2022 07:00  --------------------------------------------------------  IN: 0 mL / OUT: 450 mL / NET: -450 mL    13 Nov 2022 07:01  -  13 Nov 2022 13:10  --------------------------------------------------------  IN: 0 mL / OUT: 301 mL / NET: -301 mL        PHYSICAL EXAM:  Vital Signs Last 24 Hrs  T(C): 36.4 (13 Nov 2022 09:20), Max: 36.7 (13 Nov 2022 06:00)  T(F): 97.5 (13 Nov 2022 09:20), Max: 98 (13 Nov 2022 06:00)  HR: 89 (13 Nov 2022 09:20) (70 - 92)  BP: 106/62 (13 Nov 2022 09:20) (98/47 - 120/64)  BP(mean): --  RR: 17 (13 Nov 2022 09:20) (16 - 18)  SpO2: 96% (13 Nov 2022 09:20) (96% - 98%)    Parameters below as of 13 Nov 2022 09:20  Patient On (Oxygen Delivery Method): room air      CONSTITUTIONAL: NAD, well-developed male   RESPIRATORY: Normal respiratory effort; lungs are clear to auscultation bilaterally  CARDIOVASCULAR: Regular rate and rhythm, normal S1 and S2, no murmur/rub/gallop; No lower extremity edema; Peripheral pulses are 2+ bilaterally  ABDOMEN: Midline abdominal wound with dressing in place, tender to palpation; normoactive bowel sounds, no rebound/guarding; No hepatosplenomegaly  MUSCULOSKELETAL no clubbing or cyanosis of digits; no joint swelling or tenderness to palpation  PSYCH: A+O to person, place, and time; affect appropriate    LABS:                        7.7    2.86  )-----------( 153      ( 13 Nov 2022 05:58 )             24.1     11-13    131<L>  |  102  |  11  ----------------------------<  145<H>  3.7   |  21<L>  |  0.59    Ca    8.4      13 Nov 2022 05:58  Phos  2.7     11-13  Mg     1.60     11-13                Culture - Blood (collected 10 Nov 2022 23:00)  Source: .Blood Blood-Peripheral  Preliminary Report (12 Nov 2022 03:02):    No growth to date.    Culture - Blood (collected 10 Nov 2022 21:30)  Source: .Blood Blood-Venous  Preliminary Report (12 Nov 2022 07:01):    No growth to date.        RADIOLOGY & ADDITIONAL TESTS:  Results Reviewed:   Imaging Personally Reviewed:  Electrocardiogram Personally Reviewed:    COORDINATION OF CARE:  Care Discussed with Consultants/Other Providers [Y/N]:  Prior or Outpatient Records Reviewed [Y/N]:

## 2022-11-13 NOTE — PROGRESS NOTE ADULT - ASSESSMENT
80 y/o man with hx of resected pancreatic cancer following neoadjuvant GnP chemotherapy, and followed by adjuvant RT, whose post-op course was c/b non-healing abdominal wound, and recently re-admitted for wound bleeding. During his hospital stay, the surgical service was successful in improving the wound. After several unsuccessful attempts at biopsy, he successfully underwent EUS and biopsy of a suspicious focus of recurrent disease a few days ago but pathologic analysis on that tissue is pending. His course was recently complicated by a rapid response, c/w septic shock on the basis that he is now on broad spectrum antibiotics and midodrine pressor support. Over the last few days, he has improved somewhat, now able to ambulate to the bathroom, but still profoundly weak. During discussion this morning at bedside, he feel asleep during the middle of our discussion. Would encourage ambulation and physical therapy assistance during his stay. He does not have sufficient performance status to tolerate systemic chemotherapy at this time but may benefit from rehabilitation.    David Juan MD PhD  Oncology Attending

## 2022-11-14 NOTE — PROGRESS NOTE ADULT - ASSESSMENT
ASSESSMENT:  This is a 82 yo M with complex abdominal wound, being managed with vac/local wound care    Plan  - Continue with daily adaptic and ABD dressing changes to abdominal wound  - Wound bed healing appropriately  - Remainder of care and oncologic workup per medicine and heme/onc  - ok to start chemotherapy at any point, no contraindication from PRS perspective  - Plan discussed with Dr. Gallegos

## 2022-11-14 NOTE — PROGRESS NOTE ADULT - SUBJECTIVE AND OBJECTIVE BOX
Plastic Surgery Progress Note (pg [facility]: [id number], [facility]: [phone])    SUBJECTIVE  The patient was seen and examined. No acute events overnight.    OBJECTIVE  ___________________________________________________  VITAL SIGNS / I&O's   Vital Signs Last 24 Hrs  T(C): 36.8 (14 Nov 2022 06:27), Max: 36.8 (14 Nov 2022 06:27)  T(F): 98.2 (14 Nov 2022 06:27), Max: 98.2 (14 Nov 2022 06:27)  HR: 60 (14 Nov 2022 06:27) (60 - 89)  BP: 98/50 (14 Nov 2022 06:27) (98/50 - 109/64)  BP(mean): --  RR: 16 (14 Nov 2022 06:27) (16 - 17)  SpO2: 98% (14 Nov 2022 06:27) (96% - 100%)    Parameters below as of 14 Nov 2022 06:27  Patient On (Oxygen Delivery Method): room air      13 Nov 2022 07:01  -  14 Nov 2022 07:00  --------------------------------------------------------  IN:  Total IN: 0 mL    OUT:    Stool (mL): 1 mL    Voided (mL): 300 mL  Total OUT: 301 mL    Total NET: -301 mL    ___________________________________________________  PHYSICAL EXAM     CONSTITUTIONAL: NAD, lying in bed  -- NEURO: Awake, alert  -- CARD: hemodynamically stable  -- PULM: Non-labored respirations  -- ABDOMEN: wound with some central epithelialization, rest of wound clean, no malodor, minimal drainage    ___________________________________________________  LABS                        8.4    3.13  )-----------( 173      ( 14 Nov 2022 05:48 )             25.9     14 Nov 2022 05:48    135    |  104    |  9      ----------------------------<  129    4.1     |  22     |  0.53     Ca    8.1        14 Nov 2022 05:48  Phos  2.7       14 Nov 2022 05:48  Mg     1.60      14 Nov 2022 05:48      PT/INR - ( 14 Nov 2022 05:48 )   PT: 15.2 sec;   INR: 1.31 ratio         PTT - ( 14 Nov 2022 05:48 )  PTT:35.1 sec  CAPILLARY BLOOD GLUCOSE    ___________________________________________________  MICRO  Recent Cultures:  Specimen Source: .Blood Blood-Peripheral, 11-10 @ 23:00; Results   No growth to date.; Gram Stain: --; Organism: --  Specimen Source: .Blood Blood-Venous, 11-10 @ 21:30; Results   No growth to date.; Gram Stain: --; Organism: --  Specimen Source: Clean Catch Clean Catch (Midstream), 11-09 @ 23:40; Results   <10,000 CFU/mL Normal Urogenital Juani; Gram Stain: --; Organism: --  Specimen Source: .Stool Feces, 11-09 @ 00:00; Results   No enteric pathogens isolated.  (Stool culture examined for Salmonella,  Shigella, Campylobacter, Aeromonas, Plesiomonas,  Vibrio, E.coli O157 and Yersinia); Gram Stain: --; Organism: --    ___________________________________________________  MEDICATIONS  (STANDING):  acetaminophen     Tablet .. 650 milliGRAM(s) Oral every 6 hours  ALPRAZolam 0.25 milliGRAM(s) Oral at bedtime  aMIOdarone    Tablet 200 milliGRAM(s) Oral daily  DULoxetine 60 milliGRAM(s) Oral daily  enoxaparin Injectable 85 milliGRAM(s) SubCutaneous every 12 hours  gabapentin 100 milliGRAM(s) Oral three times a day  influenza  Vaccine (HIGH DOSE) 0.7 milliLiter(s) IntraMuscular once  levothyroxine 25 MICROGram(s) Oral daily  metoprolol succinate ER 50 milliGRAM(s) Oral daily  pancrelipase  (CREON 24,000 Lipase Units) 3 Capsule(s) Oral three times a day with meals  pantoprazole    Tablet 40 milliGRAM(s) Oral before breakfast  sacubitril 49 mG/valsartan 51 mG 1 Tablet(s) Oral two times a day  tamsulosin 0.4 milliGRAM(s) Oral at bedtime    MEDICATIONS  (PRN):  artificial tears (preservative free) Ophthalmic Solution 1 Drop(s) Both EYES every 2 hours PRN Dry Eyes  diphenhydrAMINE 25 milliGRAM(s) Oral every 6 hours PRN Rash and/or Itching  LORazepam   Injectable 0.25 milliGRAM(s) IV Push every 6 hours PRN Nausea and/or Vomiting  melatonin 3 milliGRAM(s) Oral at bedtime PRN Insomnia  oxyCODONE    IR 5 milliGRAM(s) Oral every 4 hours PRN moderate to severe pain  simethicone 80 milliGRAM(s) Chew three times a day PRN Gas

## 2022-11-14 NOTE — PROGRESS NOTE ADULT - PROBLEM SELECTOR PLAN 10
c/w tamsulosin      # DVT PPx: Full dose AC    Dispo: TBD  Would wait on pathology results for Onc reccs to decide dispo- if no chemo/immunotherapy planned, can d/c back to [facility].
c/w tamsulosin      # DVT PPx: Heparin AC
Principal Discharge DX:	Vaginal irritation  Secondary Diagnosis:	History of seizures  
c/w tamsulosin      # DVT PPx: Full dose AC    Dispo: TBD  Would wait on pathology results for Onc reccs to decide dispo- if no chemo/immunotherapy planned, can d/c back to [facility].
c/w tamsulosin      # DVT PPx: Full dose AC    Dispo: TBD  Would wait on pathology results for Onc recs to decide dispo- if no chemo/immunotherapy planned, can d/c back to [facility].

## 2022-11-14 NOTE — PROGRESS NOTE ADULT - ASSESSMENT
82 yo M w/ HTN, HLD, HFrEF (EF 30-35%), VT, s/p ACID, AS s/p TAVR, CAD s/p stents, GERD, hypothyroid, anxiety, pancreatic ca s/p whipple 10/26/21, chemo and xrt with non-healing abdominal wound treated w/ HBOT and STSG 7/8/22, s/p repeat STSG wound vac placement on 9/29, p/w bleeding from abdominal incision site now s/p new wound vac this admission now s/p removal. Course c/b EUS s/p bx of lesion, post-procedural fever now on Zosyn - cultures neg.

## 2022-11-14 NOTE — PROGRESS NOTE ADULT - SUBJECTIVE AND OBJECTIVE BOX
[facility] Division of Hospital Medicine  Stephanie Daley MD  Pager (M-F, 8A-5P): [phone]  Other Times:  [id number]    Patient is a 81y old  Male who presents with a chief complaint of Postoperative wound bleed (14 Nov 2022 07:52)      SUBJECTIVE / OVERNIGHT EVENTS:      MEDICATIONS  (STANDING):  acetaminophen     Tablet .. 650 milliGRAM(s) Oral every 6 hours  ALPRAZolam 0.25 milliGRAM(s) Oral at bedtime  aMIOdarone    Tablet 200 milliGRAM(s) Oral daily  DULoxetine 60 milliGRAM(s) Oral daily  enoxaparin Injectable 85 milliGRAM(s) SubCutaneous every 12 hours  gabapentin 100 milliGRAM(s) Oral three times a day  influenza  Vaccine (HIGH DOSE) 0.7 milliLiter(s) IntraMuscular once  levothyroxine 25 MICROGram(s) Oral daily  metoprolol succinate ER 50 milliGRAM(s) Oral daily  pancrelipase  (CREON 24,000 Lipase Units) 3 Capsule(s) Oral three times a day with meals  pantoprazole    Tablet 40 milliGRAM(s) Oral before breakfast  sacubitril 49 mG/valsartan 51 mG 1 Tablet(s) Oral two times a day  tamsulosin 0.4 milliGRAM(s) Oral at bedtime    MEDICATIONS  (PRN):  artificial tears (preservative free) Ophthalmic Solution 1 Drop(s) Both EYES every 2 hours PRN Dry Eyes  diphenhydrAMINE 25 milliGRAM(s) Oral every 6 hours PRN Rash and/or Itching  LORazepam   Injectable 0.25 milliGRAM(s) IV Push every 6 hours PRN Nausea and/or Vomiting  melatonin 3 milliGRAM(s) Oral at bedtime PRN Insomnia  oxyCODONE    IR 5 milliGRAM(s) Oral every 4 hours PRN moderate to severe pain  simethicone 80 milliGRAM(s) Chew three times a day PRN Gas      CAPILLARY BLOOD GLUCOSE        I&O's Summary    13 Nov 2022 07:01  -  14 Nov 2022 07:00  --------------------------------------------------------  IN: 0 mL / OUT: 301 mL / NET: -301 mL    14 Nov 2022 07:01  -  14 Nov 2022 10:36  --------------------------------------------------------  IN: 0 mL / OUT: 250 mL / NET: -250 mL        PHYSICAL EXAM:  Vital Signs Last 24 Hrs  T(C): 37.1 (14 Nov 2022 09:08), Max: 37.1 (14 Nov 2022 09:08)  T(F): 98.8 (14 Nov 2022 09:08), Max: 98.8 (14 Nov 2022 09:08)  HR: 69 (14 Nov 2022 09:08) (60 - 75)  BP: 112/62 (14 Nov 2022 09:08) (98/50 - 112/62)  BP(mean): --  RR: 17 (14 Nov 2022 09:08) (16 - 17)  SpO2: 97% (14 Nov 2022 09:08) (97% - 100%)    Parameters below as of 14 Nov 2022 09:08  Patient On (Oxygen Delivery Method): room air        CONSTITUTIONAL: NAD, well-developed, well-groomed  EYES: EOMI; conjunctiva and sclera clear  ENMT: Moist oral mucosa  RESPIRATORY: Normal respiratory effort; lungs are clear to auscultation bilaterally  CARDIOVASCULAR: Regular rate and rhythm, normal S1 and S2, no murmur; No lower extremity edema  ABDOMEN: Nontender to palpation, normoactive bowel sounds, no rebound/guarding  MUSCULOSKELETAL:   no clubbing or cyanosis of digits; no joint swelling or tenderness to palpation  PSYCH: A+O to person, place, and time; affect appropriate  NEUROLOGY: CN 2-12 are intact and symmetric; no gross sensory deficits   SKIN: No rashes; no palpable lesions    LABS:                        8.4    3.13  )-----------( 173      ( 14 Nov 2022 05:48 )             25.9     11-14    135  |  104  |  9   ----------------------------<  129<H>  4.1   |  22  |  0.53    Ca    8.1<L>      14 Nov 2022 05:48  Phos  2.7     11-14  Mg     1.60     11-14      PT/INR - ( 14 Nov 2022 05:48 )   PT: 15.2 sec;   INR: 1.31 ratio         PTT - ( 14 Nov 2022 05:48 )  PTT:35.1 sec          RADIOLOGY & ADDITIONAL TESTS:  Results Reviewed:   Imaging Personally Reviewed:  Electrocardiogram Personally Reviewed:    COORDINATION OF CARE:  Care Discussed with Consultants/Other Providers [Y/N]: Y  Prior or Outpatient Records Reviewed [Y/N]: Y   [facility]  Stephanie Daley MD  Pager (M-F, 8A-5P): [phone]  Other Times:  [id number]    Patient is a 81y old  Male who presents with a chief complaint of Postoperative wound bleed (14 Nov 2022 07:52)    SUBJECTIVE / OVERNIGHT EVENTS:  Pt with abd pain but denies n/v.  no bloody BMs.  he is tolerating po.      MEDICATIONS  (STANDING):  acetaminophen     Tablet .. 650 milliGRAM(s) Oral every 6 hours  ALPRAZolam 0.25 milliGRAM(s) Oral at bedtime  aMIOdarone    Tablet 200 milliGRAM(s) Oral daily  DULoxetine 60 milliGRAM(s) Oral daily  enoxaparin Injectable 85 milliGRAM(s) SubCutaneous every 12 hours  gabapentin 100 milliGRAM(s) Oral three times a day  influenza  Vaccine (HIGH DOSE) 0.7 milliLiter(s) IntraMuscular once  levothyroxine 25 MICROGram(s) Oral daily  metoprolol succinate ER 50 milliGRAM(s) Oral daily  pancrelipase  (CREON 24,000 Lipase Units) 3 Capsule(s) Oral three times a day with meals  pantoprazole    Tablet 40 milliGRAM(s) Oral before breakfast  sacubitril 49 mG/valsartan 51 mG 1 Tablet(s) Oral two times a day  tamsulosin 0.4 milliGRAM(s) Oral at bedtime    MEDICATIONS  (PRN):  artificial tears (preservative free) Ophthalmic Solution 1 Drop(s) Both EYES every 2 hours PRN Dry Eyes  diphenhydrAMINE 25 milliGRAM(s) Oral every 6 hours PRN Rash and/or Itching  LORazepam   Injectable 0.25 milliGRAM(s) IV Push every 6 hours PRN Nausea and/or Vomiting  melatonin 3 milliGRAM(s) Oral at bedtime PRN Insomnia  oxyCODONE    IR 5 milliGRAM(s) Oral every 4 hours PRN moderate to severe pain  simethicone 80 milliGRAM(s) Chew three times a day PRN Gas      CAPILLARY BLOOD GLUCOSE        I&O's Summary    13 Nov 2022 07:01  -  14 Nov 2022 07:00  --------------------------------------------------------  IN: 0 mL / OUT: 301 mL / NET: -301 mL    14 Nov 2022 07:01  -  14 Nov 2022 10:36  --------------------------------------------------------  IN: 0 mL / OUT: 250 mL / NET: -250 mL        PHYSICAL EXAM:  Vital Signs Last 24 Hrs  T(C): 37.1 (14 Nov 2022 09:08), Max: 37.1 (14 Nov 2022 09:08)  T(F): 98.8 (14 Nov 2022 09:08), Max: 98.8 (14 Nov 2022 09:08)  HR: 69 (14 Nov 2022 09:08) (60 - 75)  BP: 112/62 (14 Nov 2022 09:08) (98/50 - 112/62)  BP(mean): --  RR: 17 (14 Nov 2022 09:08) (16 - 17)  SpO2: 97% (14 Nov 2022 09:08) (97% - 100%)    Parameters below as of 14 Nov 2022 09:08  Patient On (Oxygen Delivery Method): room air        CONSTITUTIONAL: NAD, well-developed, well-groomed  EYES: EOMI; conjunctiva and sclera clear  ENMT: Moist oral mucosa  RESPIRATORY: Normal respiratory effort; lungs are clear to auscultation bilaterally  CARDIOVASCULAR: Regular rate and rhythm, normal S1 and S2, no murmur; No lower extremity edema  ABDOMEN: Nontender to palpation, normoactive bowel sounds, no rebound/guarding  MUSCULOSKELETAL:   no clubbing or cyanosis of digits; no joint swelling or tenderness to palpation  PSYCH: A+O to person, place, and time; affect appropriate  NEUROLOGY: CN 2-12 are intact and symmetric; no gross sensory deficits   SKIN: No rashes; no palpable lesions    LABS:                        8.4    3.13  )-----------( 173      ( 14 Nov 2022 05:48 )             25.9     11-14    135  |  104  |  9   ----------------------------<  129<H>  4.1   |  22  |  0.53    Ca    8.1<L>      14 Nov 2022 05:48  Phos  2.7     11-14  Mg     1.60     11-14      PT/INR - ( 14 Nov 2022 05:48 )   PT: 15.2 sec;   INR: 1.31 ratio         PTT - ( 14 Nov 2022 05:48 )  PTT:35.1 sec          RADIOLOGY & ADDITIONAL TESTS:  Results Reviewed:   Imaging Personally Reviewed:  Electrocardiogram Personally Reviewed:    COORDINATION OF CARE:  Care Discussed with Consultants/Other Providers [Y/N]: Y  Prior or Outpatient Records Reviewed [Y/N]: Y

## 2022-11-15 NOTE — PROGRESS NOTE ADULT - SUBJECTIVE AND OBJECTIVE BOX
[facility] Division of [facility] Medicine  Aria Paredes MD  Pager [phone]      Patient is a 81y old  Male who presents with a chief complaint of Postoperative wound bleed (15 Nov 2022 14:15)      SUBJECTIVE / OVERNIGHT EVENTS:    no acute event o/n. pt cont to report feeling weak. BP soft. 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 No nausea, vomiting, or hematemesis; No diarrhea or constipation. No melena or hematochezia.    MEDICATIONS  (STANDING):  acetaminophen     Tablet .. 650 milliGRAM(s) Oral every 6 hours  ALPRAZolam 0.25 milliGRAM(s) Oral at bedtime  aMIOdarone    Tablet 200 milliGRAM(s) Oral daily  DULoxetine 60 milliGRAM(s) Oral daily  enoxaparin Injectable 85 milliGRAM(s) SubCutaneous every 12 hours  gabapentin 100 milliGRAM(s) Oral three times a day  influenza  Vaccine (HIGH DOSE) 0.7 milliLiter(s) IntraMuscular once  levothyroxine 25 MICROGram(s) Oral daily  pancrelipase  (CREON 24,000 Lipase Units) 3 Capsule(s) Oral three times a day with meals  pantoprazole    Tablet 40 milliGRAM(s) Oral before breakfast  tamsulosin 0.4 milliGRAM(s) Oral at bedtime    MEDICATIONS  (PRN):  artificial tears (preservative free) Ophthalmic Solution 1 Drop(s) Both EYES every 2 hours PRN Dry Eyes  diphenhydrAMINE 25 milliGRAM(s) Oral every 6 hours PRN Rash and/or Itching  LORazepam   Injectable 0.25 milliGRAM(s) IV Push every 6 hours PRN Nausea and/or Vomiting  melatonin 3 milliGRAM(s) Oral at bedtime PRN Insomnia  oxyCODONE    IR 5 milliGRAM(s) Oral every 4 hours PRN moderate to severe pain  simethicone 80 milliGRAM(s) Chew three times a day PRN Gas      CAPILLARY BLOOD GLUCOSE        I&O's Summary    14 Nov 2022 07:01  -  15 Nov 2022 07:00  --------------------------------------------------------  IN: 0 mL / OUT: 1300 mL / NET: -1300 mL    15 Nov 2022 07:01  -  15 Nov 2022 17:35  --------------------------------------------------------  IN: 0 mL / OUT: 150 mL / NET: -150 mL        PHYSICAL EXAM:  Vital Signs Last 24 Hrs  T(C): 36.5 (15 Nov 2022 14:35), Max: 36.8 (14 Nov 2022 22:00)  T(F): 97.7 (15 Nov 2022 14:35), Max: 98.3 (14 Nov 2022 22:00)  HR: 84 (15 Nov 2022 14:35) (74 - 84)  BP: 92/52 (15 Nov 2022 14:35) (92/52 - 115/57)  BP(mean): --  RR: 17 (15 Nov 2022 14:35) (16 - 17)  SpO2: 99% (15 Nov 2022 14:35) (96% - 99%)    Parameters below as of 15 Nov 2022 14:35  Patient On (Oxygen Delivery Method): room air        CONSTITUTIONAL: NAD,  EYES: PERRLA; conjunctiva and sclera clear  ENMT: Moist oral mucosa, no pharyngeal injection or exudates;   NECK: Supple, no palpable masses;  RESPIRATORY: Normal respiratory effort; lungs are clear to auscultation bilaterally  CARDIOVASCULAR: Regular rate and rhythm, normal S1 and S2, no murmur/rub/gallop; No lower extremity edema; Peripheral pulses are 2+ bilaterally  ABDOMEN: Nontender to palpation, normoactive bowel sounds, no rebound/guarding;   MUSCLOSKELETAL:   no clubbing or cyanosis of digits; no joint swelling or tenderness to palpation  PSYCH: A+O to person, place, and time; affect appropriate  NEUROLOGY: CN 2-12 are intact and symmetric; no gross sensory deficits;   SKIN: abd wound dressing c/d/i     LABS:                        8.6    3.45  )-----------( 165      ( 15 Nov 2022 05:36 )             27.1     11-15    132<L>  |  101  |  9   ----------------------------<  118<H>  4.3   |  22  |  0.53    Ca    8.1<L>      15 Nov 2022 05:36  Phos  2.5     11-15  Mg     1.50     11-15      PT/INR - ( 14 Nov 2022 05:48 )   PT: 15.2 sec;   INR: 1.31 ratio         PTT - ( 14 Nov 2022 05:48 )  PTT:35.1 sec            RADIOLOGY & ADDITIONAL TESTS:  Results Reviewed:   Imaging Personally Reviewed:  Electrocardiogram Personally Reviewed:    COORDINATION OF CARE:  Care Discussed with Consultants/Other Providers [Y/N]:  Prior or Outpatient Records Reviewed [Y/N]:

## 2022-11-15 NOTE — PROGRESS NOTE ADULT - ASSESSMENT
82 yo M w/ HTN, HLD, HFrEF (EF 30-35%), VT, s/p ACID, AS s/p TAVR, CAD s/p stents, GERD, hypothyroid, anxiety, pancreatic ca s/p whipple 10/26/21, chemo and xrt with non-healing abdominal wound treated w/ HBOT and STSG 7/8/22, s/p repeat STSG wound vac placement on 9/29, p/w bleeding from abdominal incision site now s/p new wound vac this admission now s/p removal. Course c/b EUS s/p bx of lesion, path + adenocarcinoma concern for recurrence of pancreatic ca

## 2022-11-15 NOTE — PROGRESS NOTE ADULT - SUBJECTIVE AND OBJECTIVE BOX
INTERVAL HPI/OVERNIGHT EVENTS:  Patient seen at bedside.  Patient endorsing " I hope to get some good news"  Patient with continued symptoms of nausea and vomiting  Discussed patient recent path reports  Explained that symptoms may be 2.2 to recurred disease  Patient inquiring about treatment options   Patient requesting that team speaks to family about results      VITAL SIGNS:  T(F): 97.5 (11-15-22 @ 10:50)  HR: 75 (11-15-22 @ 10:50)  BP: 115/57 (11-15-22 @ 10:50)  RR: 17 (11-15-22 @ 10:50)  SpO2: 97% (11-15-22 @ 10:50)  Wt(kg): --    PHYSICAL EXAM:  CONSTITUTIONAL: NAD, well-developed, well-groomed  EYES: EOMI; conjunctiva and sclera clear  ENMT: Moist oral mucosa  RESPIRATORY: Normal respiratory effort; lungs are clear to auscultation bilaterally  CARDIOVASCULAR: Regular rate and rhythm, normal S1 and S2, no murmur; No lower extremity edema  ABDOMEN: Nontender to palpation, normoactive bowel sounds, no rebound/guarding  MUSCULOSKELETAL:   no clubbing or cyanosis of digits; no joint swelling or tenderness to palpation  PSYCH: A+O to person, place, and time; affect appropriate  NEUROLOGY: CN 2-12 are intact and symmetric; no gross sensory deficits   SKIN: No rashes; no palpable lesions      MEDICATIONS  (STANDING):  acetaminophen     Tablet .. 650 milliGRAM(s) Oral every 6 hours  ALPRAZolam 0.25 milliGRAM(s) Oral at bedtime  aMIOdarone    Tablet 200 milliGRAM(s) Oral daily  DULoxetine 60 milliGRAM(s) Oral daily  enoxaparin Injectable 85 milliGRAM(s) SubCutaneous every 12 hours  gabapentin 100 milliGRAM(s) Oral three times a day  influenza  Vaccine (HIGH DOSE) 0.7 milliLiter(s) IntraMuscular once  levothyroxine 25 MICROGram(s) Oral daily  magnesium oxide 400 milliGRAM(s) Oral three times a day with meals  pancrelipase  (CREON 24,000 Lipase Units) 3 Capsule(s) Oral three times a day with meals  pantoprazole    Tablet 40 milliGRAM(s) Oral before breakfast  tamsulosin 0.4 milliGRAM(s) Oral at bedtime    MEDICATIONS  (PRN):  artificial tears (preservative free) Ophthalmic Solution 1 Drop(s) Both EYES every 2 hours PRN Dry Eyes  diphenhydrAMINE 25 milliGRAM(s) Oral every 6 hours PRN Rash and/or Itching  LORazepam   Injectable 0.25 milliGRAM(s) IV Push every 6 hours PRN Nausea and/or Vomiting  melatonin 3 milliGRAM(s) Oral at bedtime PRN Insomnia  oxyCODONE    IR 5 milliGRAM(s) Oral every 4 hours PRN moderate to severe pain  simethicone 80 milliGRAM(s) Chew three times a day PRN Gas      Allergies    No Known Allergies    Intolerances        LABS:                        8.6    3.45  )-----------( 165      ( 15 Nov 2022 05:36 )             27.1     11-15    132<L>  |  101  |  9   ----------------------------<  118<H>  4.3   |  22  |  0.53    Ca    8.1<L>      15 Nov 2022 05:36  Phos  2.5     11-15  Mg     1.50     11-15      PT/INR - ( 14 Nov 2022 05:48 )   PT: 15.2 sec;   INR: 1.31 ratio         PTT - ( 14 Nov 2022 05:48 )  PTT:35.1 sec      RADIOLOGY & ADDITIONAL TESTS:  Studies reviewed.

## 2022-11-15 NOTE — PROGRESS NOTE ADULT - ASSESSMENT
81m with h/p pancreatic cancer s/p neoadjuvant gem/abraxane x 5 cycles completed in 9/21, s/p whipple operation 10/26/21. Pathology revealed pancreatic adenocarcinoma, 0/31LN, positive pancreatic resection margin requiring adjuvant RT (SBRT 4000 cGy in 5 fxs) completed in 1/19/22. Post op course c/b non healing abdominal wound, presenting for wound bleeding.     CT a/p 10/26:   New portal venous thrombosis involving the main, right, and left portal   veins. The portal splenic confluence and central splenic vein are not   well visualized and may be thrombosed.  Status post Whipple procedure. Question ill-defined soft tissue encasing   and obliterating the proximal main portal vein and portal splenic   confluence, and encasing the hepatic artery, raising the possibility of   locally recurrent disease.  New wedge-shaped geographic regions of low attenuation throughout the   liver, likely perfusional abnormalities related to new portal venous   thrombosis and possibly developing infarcts, with neoplasm thought to be   less likely.  New small volume ascites. New moderate bilateral pleural effusions.  Small focus of gas within the urinary bladder. Correlate for recent   instrumentation/catheterization.    CT results discussed with patient, his son at bedside and his daughter over the phone.     -CT A/p with concern for progression of disease given rising tumor markers, CT findings and rising signatera.   -Not amenable to tissue biopsy by IR  -Tissue is needed to guide the next steps in management. Will need molecular testing and PDL1 on the biopsy specimen.   -Advanced GI input apprecaited, S/p EUS 11/10 w/ biopsies   -Path from [facility] of pancreatic mass consistent  with  Adenocarcinoma, moderately differentiated. Suggestive of recurred disease. Would rec CT chest with IV contrast for full restaging and to r/o any mets.   -Results were discussed with Mr Gatica. Given recurrence of cancer, future treatment will only be palliative in nature, with goal of shrinking or delaying growth of tumor(s). Patient pending dispo with medical optimization. Likely dispo to  [facility] given physical deconditioning No plans for systemic therapy while admitted at rehab. Patient to followup at outpatient cancer clinic for further treatment plan. Per patient request will also discuss results and plan with his dtr Una [phone]  -Hospital course has been complicated by post procedure sepsis and now symptoms of diarrhea.   -Care as per primary team.  -Appreciate Palliative care consult for symptom management  -Patient with new PVT, as per patient,  has been off  AC (for h/o CAD) for more than 1 month given abdominal wound complications. Currently on Lovenox.  -Patient to followup with Dr. Aden Juan ([facility]) upon discharge  -C/w Supportive care, pain control, Nutrition, PT, DVT ppx  -Oncology will continue to follow with you      Case discussed with Dr. Avel GARCÍA  Oncology Physician Assistant  Sharron TOBAR/VALERY [facility]  Pager [phone] also available on Teams    If before 8am/after 5pm or on weekends please page On-call Oncology Fellow     81m with h/p pancreatic cancer s/p neoadjuvant gem/abraxane x 5 cycles completed in 9/21, s/p whipple operation 10/26/21. Pathology revealed pancreatic adenocarcinoma, 0/31LN, positive pancreatic resection margin requiring adjuvant RT (SBRT 4000 cGy in 5 fxs) completed in 1/19/22. Post op course c/b non healing abdominal wound, presenting for wound bleeding.     CT a/p 10/26:   New portal venous thrombosis involving the main, right, and left portal   veins. The portal splenic confluence and central splenic vein are not   well visualized and may be thrombosed.  Status post Whipple procedure. Question ill-defined soft tissue encasing   and obliterating the proximal main portal vein and portal splenic   confluence, and encasing the hepatic artery, raising the possibility of   locally recurrent disease.  New wedge-shaped geographic regions of low attenuation throughout the   liver, likely perfusional abnormalities related to new portal venous   thrombosis and possibly developing infarcts, with neoplasm thought to be   less likely.  New small volume ascites. New moderate bilateral pleural effusions.  Small focus of gas within the urinary bladder. Correlate for recent   instrumentation/catheterization.    CT results discussed with patient, his son at bedside and his daughter over the phone.     -CT A/p with concern for progression of disease given rising tumor markers, CT findings and rising signatera.   -Not amenable to tissue biopsy by IR  -Tissue is needed to guide the next steps in management. Will need molecular testing and PDL1 on the biopsy specimen.   -Advanced GI input apprecaited, S/p EUS 11/10 w/ biopsies   -Path from [facility] of pancreatic mass consistent  with  Adenocarcinoma, moderately differentiated. Suggestive of recurred disease. Would rec CT chest with IV contrast for full restaging and to r/o any mets.   -Results were discussed with Mr Gatica. Given recurrence of cancer, future treatment will only be palliative in nature, with goal of shrinking or delaying growth of tumor(s). Patient pending dispo with medical optimization. Likely dispo to  [facility] given physical deconditioning No plans for systemic therapy while admitted at rehab. Patient to followup at outpatient cancer clinic for further treatment plan. Per patient request will also discuss results and plan with his dtr Una [phone]  -Hospital course has been complicated by post procedure sepsis,  diarrhea and anemia.   -Care as per primary team.  -Appreciate Palliative care consult for symptom management  -Patient with new PVT, as per patient,  has been off  AC (for h/o CAD) for more than 1 month given abdominal wound complications. Currently on Lovenox.  -Patient to followup with Dr. Aden Juan ([facility]) upon discharge  -C/w Supportive care, pain control, Nutrition, PT, DVT ppx  -Oncology will continue to follow with you      Case discussed with Dr. Avel GARCÍA  Oncology Physician Assistant  Sharron TOBAR/VALERY [facility]  Pager [phone] also available on Teams    If before 8am/after 5pm or on weekends please page On-call Oncology Fellow

## 2022-11-15 NOTE — PROGRESS NOTE ADULT - NSPROGADDITIONALINFOA_GEN_ALL_CORE
Updated daughter Una 11/12
Updated daughters Una and Pippa over the phone today
Updated daughters Una and Pippa over the phone today
Updated daughter Una 11/12
Patient with episode of asymptomatic hypotension last night, this AM BP improved with IVF. Low BP likely 2/2 diarrhea losses. Will give IVF PRN cautiously given hx of HF.   Patient currently is asymptomatic and feels at baseline, appears to be stable for EUS today.
case discussed with multidisciplinary team and family members 11/15 as above
Updated daughter Una 11/10, left VM 11/11
discussed with son and daughter over phone on 11/14- questions answered.

## 2022-11-15 NOTE — CHART NOTE - NSCHARTNOTEFT_GEN_A_CORE
Spoke to Una, patient dtr per patient request  Una available via phone call at  at around 3pm  Discussed new pathology findings consistent with recurrence, and need for   restaging scan with CT chest  Explained that patient may be a candidate for systemic therapy  treatment plan to be determined. Will depend on further genetic testing and also  patient performance status. Currently patient with decline in PS  would rec short rehab stay  prior to initiating chemo  Explained that given recurrent disease, treatment goal would be palliative to delay and/or shrink disease  Treatment plan to be decided by Dr Aden Juan, Explained that prognosis would be variable   All questions and concerns addressed,  Una inquiring if it would be possible to have Dr Mercer and/or surgical oncology consulted re new path and CT findings, will relay to primary team          Duration of conversation: approx 30 min

## 2022-11-16 NOTE — PROGRESS NOTE ADULT - ASSESSMENT
80 yo M w/ HTN, HLD, HFrEF (EF 30-35%), VT, s/p ACID, AS s/p TAVR, CAD s/p stents, GERD, hypothyroid, anxiety, pancreatic ca s/p whipple 10/26/21, chemo and xrt with non-healing abdominal wound treated w/ HBOT and STSG 7/8/22, s/p repeat STSG wound vac placement on 9/29, p/w bleeding from abdominal incision site now s/p new wound vac this admission now s/p removal. Course c/b EUS s/p bx of lesion, path + adenocarcinoma concern for recurrence of pancreatic ca

## 2022-11-16 NOTE — PROGRESS NOTE ADULT - SUBJECTIVE AND OBJECTIVE BOX
[facility] Division of Hospital Medicine  Aria Paredes MD  Pager [phone]      Patient is a 81y old  Male who presents with a chief complaint of Postoperative wound bleed (15 Nov 2022 17:35)      SUBJECTIVE / OVERNIGHT EVENTS:    no acute event. BP improved. pt offers no new complaint     ADDITIONAL REVIEW OF SYSTEMS:    RESPIRATORY: No cough, wheezing, chills or hemoptysis; No shortness of breath  CARDIOVASCULAR: No chest pain, palpitations, dizziness, or leg swelling  GASTROINTESTINAL: No abdominal or epigastric pain. No nausea, vomiting, or hematemesis; No diarrhea or constipation. No melena or hematochezia.      MEDICATIONS  (STANDING):  acetaminophen     Tablet .. 650 milliGRAM(s) Oral every 6 hours  ALPRAZolam 0.25 milliGRAM(s) Oral at bedtime  aMIOdarone    Tablet 200 milliGRAM(s) Oral daily  DULoxetine 60 milliGRAM(s) Oral daily  enoxaparin Injectable 85 milliGRAM(s) SubCutaneous every 12 hours  gabapentin 100 milliGRAM(s) Oral three times a day  influenza  Vaccine (HIGH DOSE) 0.7 milliLiter(s) IntraMuscular once  levothyroxine 25 MICROGram(s) Oral daily  pancrelipase  (CREON 24,000 Lipase Units) 3 Capsule(s) Oral three times a day with meals  pantoprazole    Tablet 40 milliGRAM(s) Oral before breakfast  tamsulosin 0.4 milliGRAM(s) Oral at bedtime    MEDICATIONS  (PRN):  artificial tears (preservative free) Ophthalmic Solution 1 Drop(s) Both EYES every 2 hours PRN Dry Eyes  diphenhydrAMINE 25 milliGRAM(s) Oral every 6 hours PRN Rash and/or Itching  LORazepam   Injectable 0.25 milliGRAM(s) IV Push every 6 hours PRN Nausea and/or Vomiting  melatonin 3 milliGRAM(s) Oral at bedtime PRN Insomnia  oxyCODONE    IR 5 milliGRAM(s) Oral every 4 hours PRN moderate to severe pain  simethicone 80 milliGRAM(s) Chew three times a day PRN Gas      CAPILLARY BLOOD GLUCOSE        I&O's Summary    15 Nov 2022 07:01  -  16 Nov 2022 07:00  --------------------------------------------------------  IN: 0 mL / OUT: 725 mL / NET: -725 mL        PHYSICAL EXAM:  Vital Signs Last 24 Hrs  T(C): 36.4 (16 Nov 2022 10:40), Max: 36.6 (15 Nov 2022 21:18)  T(F): 97.5 (16 Nov 2022 10:40), Max: 97.9 (16 Nov 2022 02:03)  HR: 75 (16 Nov 2022 10:40) (72 - 84)  BP: 107/59 (16 Nov 2022 10:40) (92/52 - 112/60)  BP(mean): --  RR: 18 (16 Nov 2022 10:40) (16 - 18)  SpO2: 100% (16 Nov 2022 10:40) (97% - 100%)    Parameters below as of 16 Nov 2022 10:40  Patient On (Oxygen Delivery Method): room air        CONSTITUTIONAL: NAD,  EYES: PERRLA; conjunctiva and sclera clear  ENMT: Moist oral mucosa, no pharyngeal injection or exudates;   NECK: Supple, no palpable masses;  RESPIRATORY: Normal respiratory effort; lungs are clear to auscultation bilaterally  CARDIOVASCULAR: Regular rate and rhythm, normal S1 and S2, no murmur/rub/gallop; No lower extremity edema; Peripheral pulses are 2+ bilaterally  ABDOMEN: Nontender to palpation, normoactive bowel sounds, no rebound/guarding;   MUSCLOSKELETAL:   no clubbing or cyanosis of digits; no joint swelling or tenderness to palpation  PSYCH: A+O to person, place, and time; affect appropriate  NEUROLOGY: CN 2-12 are intact and symmetric; no gross sensory deficits;   SKIN: abd wound dressing c/d/i     LABS:                        8.3    3.19  )-----------( 159      ( 16 Nov 2022 05:50 )             25.6     11-16    133<L>  |  101  |  10  ----------------------------<  147<H>  4.1   |  22  |  0.51    Ca    8.6      16 Nov 2022 05:50  Phos  2.5     11-16  Mg     1.60     11-16                  RADIOLOGY & ADDITIONAL TESTS:  Results Reviewed:   Imaging Personally Reviewed:  Electrocardiogram Personally Reviewed:    COORDINATION OF CARE:  Care Discussed with Consultants/Other Providers [Y/N]:  Prior or Outpatient Records Reviewed [Y/N]:

## 2022-11-17 NOTE — PROGRESS NOTE ADULT - SUBJECTIVE AND OBJECTIVE BOX
[facility]  Division of Hospital Medicine  Paula Weiner MD  Pager: [phone]      Patient is a 81y old  Male who presents with a chief complaint of Postoperative wound bleed (16 Nov 2022 14:21)      SUBJECTIVE / OVERNIGHT EVENTS:Patient examined at bedside. Awaiting CT chest for staging. Hemoglobin stable and blood pressure stable. No medical concerns at this time   ADDITIONAL REVIEW OF SYSTEMS:    MEDICATIONS  (STANDING):  acetaminophen     Tablet .. 650 milliGRAM(s) Oral every 6 hours  ALPRAZolam 0.25 milliGRAM(s) Oral at bedtime  aMIOdarone    Tablet 200 milliGRAM(s) Oral daily  DULoxetine 60 milliGRAM(s) Oral daily  enoxaparin Injectable 85 milliGRAM(s) SubCutaneous every 12 hours  gabapentin 100 milliGRAM(s) Oral three times a day  influenza  Vaccine (HIGH DOSE) 0.7 milliLiter(s) IntraMuscular once  levothyroxine 25 MICROGram(s) Oral daily  pancrelipase  (CREON 24,000 Lipase Units) 3 Capsule(s) Oral three times a day with meals  pantoprazole    Tablet 40 milliGRAM(s) Oral before breakfast  tamsulosin 0.4 milliGRAM(s) Oral at bedtime    MEDICATIONS  (PRN):  artificial tears (preservative free) Ophthalmic Solution 1 Drop(s) Both EYES every 2 hours PRN Dry Eyes  diphenhydrAMINE 25 milliGRAM(s) Oral every 6 hours PRN Rash and/or Itching  LORazepam   Injectable 0.25 milliGRAM(s) IV Push every 6 hours PRN Nausea and/or Vomiting  melatonin 3 milliGRAM(s) Oral at bedtime PRN Insomnia  oxyCODONE    IR 5 milliGRAM(s) Oral every 4 hours PRN moderate to severe pain  simethicone 80 milliGRAM(s) Chew three times a day PRN Gas      CAPILLARY BLOOD GLUCOSE        I&O's Summary    16 Nov 2022 07:01  -  17 Nov 2022 07:00  --------------------------------------------------------  IN: 0 mL / OUT: 400 mL / NET: -400 mL        PHYSICAL EXAM:  Vital Signs Last 24 Hrs  T(C): 36.7 (17 Nov 2022 08:56), Max: 36.9 (17 Nov 2022 01:56)  T(F): 98.1 (17 Nov 2022 08:56), Max: 98.5 (17 Nov 2022 01:56)  HR: 91 (17 Nov 2022 08:56) (81 - 93)  BP: 121/59 (17 Nov 2022 08:56) (110/57 - 121/59)  BP(mean): --  RR: 17 (17 Nov 2022 08:56) (16 - 18)  SpO2: 97% (17 Nov 2022 08:56) (97% - 100%)    Parameters below as of 17 Nov 2022 08:56  Patient On (Oxygen Delivery Method): room air    CONSTITUTIONAL: Elderly man in NAD,  EYES: PERRLA; conjunctiva and sclera clear  ENMT: Moist oral mucosa, no pharyngeal injection or exudates;   NECK: Supple, no palpable masses;  RESPIRATORY: Normal respiratory effort; lungs are clear to auscultation bilaterally  CARDIOVASCULAR: Regular rate and rhythm, normal S1 and S2, no murmur/rub/gallop; No lower extremity edema; Peripheral pulses are 2+ bilaterally  ABDOMEN: Nontender to palpation, normoactive bowel sounds, no rebound/guarding;   MUSCLOSKELETAL:   no clubbing or cyanosis of digits; no joint swelling or tenderness to palpation  PSYCH: A+O to person, place, and time; affect appropriate  NEUROLOGY:  no gross sensory deficits;   SKIN: abd wound dressing c/d/i     LABS:                        9.1    3.34  )-----------( 154      ( 17 Nov 2022 05:38 )             27.2     11-17    135  |  102  |  9   ----------------------------<  127<H>  4.1   |  24  |  0.50    Ca    8.8      17 Nov 2022 05:38  Phos  2.9     11-17  Mg     1.60     11-17                  RADIOLOGY & ADDITIONAL TESTS:  Results Reviewed:   Imaging Personally Reviewed:  Electrocardiogram Personally Reviewed:    COORDINATION OF CARE:  Care Discussed with Consultants/Other Providers [Y/N]:  Prior or Outpatient Records Reviewed [Y/N]:

## 2022-11-17 NOTE — CHART NOTE - NSCHARTNOTEFT_GEN_A_CORE
Patient being seen for malnutrition follow up. Spoke with pt and obtained subjective information from extensive chart review.     Current Diet : Diet, Regular:   Low Sodium  Supplement Feeding Modality:  Oral  Ensure Plus High Protein Cans or Servings Per Day:  1       Frequency:  Three Times a day (11-11-22 @ 11:03)    PO intake:  50-75%    Current Weight Trend: 85.9 kg (11/17), 86.4 kg (11/14)  Height (cm): 185.4   Admit Weight (kg): 86   BMI (kg/m2): 25     Nutrition Interval Events: Pt with "off and on" nausea/occasional vomiting with oral intake reflecting these occurrences. He tries to consume as much Ensure supplement as he can tolerate. Weight trend fairly stable from admit wt. No new food preferences offered. Pt remains at severe risk for malnutrition based on continued physical evidence of muscle and fat wasting. Encourage and monitor oral intake, especially of nutrition supplement. RDN services to remain available as needed.     __________________ Pertinent Medications__________________   MEDICATIONS  (STANDING):  acetaminophen     Tablet .. 650 milliGRAM(s) Oral every 6 hours  ALPRAZolam 0.25 milliGRAM(s) Oral at bedtime  aMIOdarone    Tablet 200 milliGRAM(s) Oral daily  DULoxetine 60 milliGRAM(s) Oral daily  enoxaparin Injectable 85 milliGRAM(s) SubCutaneous every 12 hours  gabapentin 100 milliGRAM(s) Oral three times a day  influenza  Vaccine (HIGH DOSE) 0.7 milliLiter(s) IntraMuscular once  levothyroxine 25 MICROGram(s) Oral daily  pancrelipase  (CREON 24,000 Lipase Units) 3 Capsule(s) Oral three times a day with meals  pantoprazole    Tablet 40 milliGRAM(s) Oral before breakfast  tamsulosin 0.4 milliGRAM(s) Oral at bedtime    MEDICATIONS  (PRN):  artificial tears (preservative free) Ophthalmic Solution 1 Drop(s) Both EYES every 2 hours PRN Dry Eyes  diphenhydrAMINE 25 milliGRAM(s) Oral every 6 hours PRN Rash and/or Itching  LORazepam   Injectable 0.25 milliGRAM(s) IV Push every 6 hours PRN Nausea and/or Vomiting  melatonin 3 milliGRAM(s) Oral at bedtime PRN Insomnia  oxyCODONE    IR 5 milliGRAM(s) Oral every 4 hours PRN moderate to severe pain  simethicone 80 milliGRAM(s) Chew three times a day PRN Gas      __________________ Pertinent Labs__________________   11-17 Na135 mmol/L Glu 127 mg/dL<H> K+ 4.1 mmol/L Cr  0.50 mg/dL BUN 9 mg/dL 11-17 Phos 2.9 mg/dL        Skin: Intact    Estimated Needs:   [x] no change since previous assessment      Nutrition Diagnosis: Severe malnutrition  [x] ongoing    Goal(s):  1. Patient to meet > 75% estimated energy needs    Recommendations:   1. Continue nutrition plan of care as ordered.    Monitoring and Evaluation:   1. Monitor weights, labs, BMs, skin integrity, PO intake and edema.  2. RD services to remain available.

## 2022-11-17 NOTE — CHART NOTE - NSCHARTNOTESELECT_GEN_ALL_CORE
Diarrhea/Event Note
Event Note
Oncology PA Note
Event Note
Febrile, HypoTN/Event Note
Follow-Up/Nutrition Services
GI Fellow/Off Service Note
I stop/Event Note
Nutrition Follow Up/Nutrition Services
Nutrition Services
Oncology PA Note
RRT/Event Note

## 2022-11-18 NOTE — DISCHARGE NOTE NURSING/CASE MANAGEMENT/SOCIAL WORK - NSDCPNDISPN_GEN_ALL_CORE
English Education provided on the pain management plan of care/Side effects of pain management treatment/Activities of daily living, including home environment that might     exacerbate pain or reduce effectiveness of the pain management plan of care as well as strategies to address these issues/Safe use, storage and disposal of opioids when prescribed

## 2022-11-18 NOTE — PROGRESS NOTE ADULT - PROBLEM SELECTOR PROBLEM 1
Nausea & vomiting
Pancreatic cancer
Open abdominal wall wound
Pancreatic cancer
Open abdominal wall wound
Open abdominal wall wound
Pancreatic cancer
Open abdominal wall wound
Pancreatic cancer
Open abdominal wall wound
Pancreatic cancer
Open abdominal wall wound
Pancreatic cancer

## 2022-11-18 NOTE — PROGRESS NOTE ADULT - SUBJECTIVE AND OBJECTIVE BOX
[facility]  Division of Hospital Medicine  Paula Weiner MD  Pager: [phone]      Patient is a 81y old  Male who presents with a chief complaint of Postoperative wound bleed (18 Nov 2022 08:10)      SUBJECTIVE / OVERNIGHT EVENTS: Patient is pending bed at facility. CT chest done and noted for 4mm nodule from 2mm plan for repeat CT in 3 months discussed with pt   ADDITIONAL REVIEW OF SYSTEMS:    MEDICATIONS  (STANDING):  acetaminophen     Tablet .. 650 milliGRAM(s) Oral every 6 hours  ALPRAZolam 0.25 milliGRAM(s) Oral at bedtime  aMIOdarone    Tablet 200 milliGRAM(s) Oral daily  DULoxetine 60 milliGRAM(s) Oral daily  enoxaparin Injectable 85 milliGRAM(s) SubCutaneous every 12 hours  gabapentin 100 milliGRAM(s) Oral three times a day  influenza  Vaccine (HIGH DOSE) 0.7 milliLiter(s) IntraMuscular once  levothyroxine 25 MICROGram(s) Oral daily  pancrelipase  (CREON 24,000 Lipase Units) 3 Capsule(s) Oral three times a day with meals  pantoprazole    Tablet 40 milliGRAM(s) Oral before breakfast  tamsulosin 0.4 milliGRAM(s) Oral at bedtime    MEDICATIONS  (PRN):  artificial tears (preservative free) Ophthalmic Solution 1 Drop(s) Both EYES every 2 hours PRN Dry Eyes  diphenhydrAMINE 25 milliGRAM(s) Oral every 6 hours PRN Rash and/or Itching  LORazepam   Injectable 0.25 milliGRAM(s) IV Push every 6 hours PRN Nausea and/or Vomiting  melatonin 3 milliGRAM(s) Oral at bedtime PRN Insomnia  oxyCODONE    IR 5 milliGRAM(s) Oral every 4 hours PRN moderate to severe pain  simethicone 80 milliGRAM(s) Chew three times a day PRN Gas      CAPILLARY BLOOD GLUCOSE        I&O's Summary    17 Nov 2022 07:01  -  18 Nov 2022 07:00  --------------------------------------------------------  IN: 0 mL / OUT: 1100 mL / NET: -1100 mL    18 Nov 2022 07:01  -  18 Nov 2022 11:04  --------------------------------------------------------  IN: 0 mL / OUT: 300 mL / NET: -300 mL        PHYSICAL EXAM:  Vital Signs Last 24 Hrs  T(C): 36.4 (18 Nov 2022 09:05), Max: 37.1 (17 Nov 2022 13:10)  T(F): 97.5 (18 Nov 2022 09:05), Max: 98.8 (17 Nov 2022 13:10)  HR: 89 (18 Nov 2022 09:05) (83 - 96)  BP: 109/58 (18 Nov 2022 09:05) (107/52 - 123/52)  BP(mean): --  RR: 17 (18 Nov 2022 09:05) (16 - 18)  SpO2: 97% (18 Nov 2022 09:05) (96% - 100%)    Parameters below as of 18 Nov 2022 09:05  Patient On (Oxygen Delivery Method): room air      CONSTITUTIONAL: NAD, well-developed, well-groomed  EYES: PERRLA; conjunctiva and sclera clear  ENMT: Moist oral mucosa, no pharyngeal injection or exudates; normal dentition  NECK: Supple, no palpable masses; no thyromegaly  RESPIRATORY: Normal respiratory effort; lungs are clear to auscultation bilaterally  CARDIOVASCULAR: Regular rate and rhythm, normal S1 and S2, no murmur/rub/gallop; No lower extremity edema; Peripheral pulses are 2+ bilaterally  ABDOMEN: Nontender to palpation, normoactive bowel sounds, no rebound/guarding; No hepatosplenomegaly  MUSCULOSKELETAL:  Normal gait; no clubbing or cyanosis of digits; no joint swelling or tenderness to palpation  PSYCH: A+O to person, place, and time; affect appropriate  NEUROLOGY: CN 2-12 are intact and symmetric; no gross sensory deficits   SKIN: No rashes; no palpable lesions    LABS:                        9.2    3.48  )-----------( 148      ( 18 Nov 2022 05:52 )             28.5     11-18    133<L>  |  100  |  9   ----------------------------<  118<H>  4.1   |  23  |  0.52    Ca    9.0      18 Nov 2022 05:52  Phos  2.9     11-18  Mg     1.70     11-18    TPro  6.4  /  Alb  2.8<L>  /  TBili  0.8  /  DBili  x   /  AST  17  /  ALT  11  /  AlkPhos  101  11-18                RADIOLOGY & ADDITIONAL TESTS:  Results Reviewed: < from: CT Chest w/ IV Cont (11.17.22 @ 12:45) >    Indeterminant 4 mm right apical nodule is increased in size since   6/16/2022 when it measured 2 mm. A follow-up CT chest is recommended in 3   months to assess for stability.    Small bilateral pleural effusions.    < end of copied text >    Imaging Personally Reviewed:  Electrocardiogram Personally Reviewed:    COORDINATION OF CARE:  Care Discussed with Consultants/Other Providers [Y/N]:  Prior or Outpatient Records Reviewed [Y/N]:   [facility]  Division of Hospital Medicine  Paula Weiner MD  Pager: [phone]      Patient is a 81y old  Male who presents with a chief complaint of Postoperative wound bleed (18 Nov 2022 08:10)      SUBJECTIVE / OVERNIGHT EVENTS: Patient is pending bed at facility. CT chest done and noted for 4mm nodule from 2mm plan for repeat CT in 3 months discussed with pt   ADDITIONAL REVIEW OF SYSTEMS:    MEDICATIONS  (STANDING):  acetaminophen     Tablet .. 650 milliGRAM(s) Oral every 6 hours  ALPRAZolam 0.25 milliGRAM(s) Oral at bedtime  aMIOdarone    Tablet 200 milliGRAM(s) Oral daily  DULoxetine 60 milliGRAM(s) Oral daily  enoxaparin Injectable 85 milliGRAM(s) SubCutaneous every 12 hours  gabapentin 100 milliGRAM(s) Oral three times a day  influenza  Vaccine (HIGH DOSE) 0.7 milliLiter(s) IntraMuscular once  levothyroxine 25 MICROGram(s) Oral daily  pancrelipase  (CREON 24,000 Lipase Units) 3 Capsule(s) Oral three times a day with meals  pantoprazole    Tablet 40 milliGRAM(s) Oral before breakfast  tamsulosin 0.4 milliGRAM(s) Oral at bedtime    MEDICATIONS  (PRN):  artificial tears (preservative free) Ophthalmic Solution 1 Drop(s) Both EYES every 2 hours PRN Dry Eyes  diphenhydrAMINE 25 milliGRAM(s) Oral every 6 hours PRN Rash and/or Itching  LORazepam   Injectable 0.25 milliGRAM(s) IV Push every 6 hours PRN Nausea and/or Vomiting  melatonin 3 milliGRAM(s) Oral at bedtime PRN Insomnia  oxyCODONE    IR 5 milliGRAM(s) Oral every 4 hours PRN moderate to severe pain  simethicone 80 milliGRAM(s) Chew three times a day PRN Gas      CAPILLARY BLOOD GLUCOSE        I&O's Summary    17 Nov 2022 07:01  -  18 Nov 2022 07:00  --------------------------------------------------------  IN: 0 mL / OUT: 1100 mL / NET: -1100 mL    18 Nov 2022 07:01  -  18 Nov 2022 11:04  --------------------------------------------------------  IN: 0 mL / OUT: 300 mL / NET: -300 mL        PHYSICAL EXAM:  Vital Signs Last 24 Hrs  T(C): 36.4 (18 Nov 2022 09:05), Max: 37.1 (17 Nov 2022 13:10)  T(F): 97.5 (18 Nov 2022 09:05), Max: 98.8 (17 Nov 2022 13:10)  HR: 89 (18 Nov 2022 09:05) (83 - 96)  BP: 109/58 (18 Nov 2022 09:05) (107/52 - 123/52)  BP(mean): --  RR: 17 (18 Nov 2022 09:05) (16 - 18)  SpO2: 97% (18 Nov 2022 09:05) (96% - 100%)    Parameters below as of 18 Nov 2022 09:05  Patient On (Oxygen Delivery Method): room air      CONSTITUTIONAL: Elderly man in NAD,  EYES: PERRLA; conjunctiva and sclera clear  ENMT: Moist oral mucosa, no pharyngeal injection or exudates;   NECK: Supple, no palpable masses;  RESPIRATORY: Normal respiratory effort; lungs are clear to auscultation bilaterally  CARDIOVASCULAR: Regular rate and rhythm, normal S1 and S2, no murmur/rub/gallop; No lower extremity edema; Peripheral pulses are 2+ bilaterally  ABDOMEN: Nontender to palpation, normoactive bowel sounds, no rebound/guarding;   MUSCLOSKELETAL:   no clubbing or cyanosis of digits; no joint swelling or tenderness to palpation  PSYCH: A+O to person, place, and time; affect appropriate  NEUROLOGY:  no gross sensory deficits;   SKIN: abd wound dressing c/d/i     LABS:                        9.2    3.48  )-----------( 148      ( 18 Nov 2022 05:52 )             28.5     11-18    133<L>  |  100  |  9   ----------------------------<  118<H>  4.1   |  23  |  0.52    Ca    9.0      18 Nov 2022 05:52  Phos  2.9     11-18  Mg     1.70     11-18    TPro  6.4  /  Alb  2.8<L>  /  TBili  0.8  /  DBili  x   /  AST  17  /  ALT  11  /  AlkPhos  101  11-18                RADIOLOGY & ADDITIONAL TESTS:  Results Reviewed: < from: CT Chest w/ IV Cont (11.17.22 @ 12:45) >    Indeterminant 4 mm right apical nodule is increased in size since   6/16/2022 when it measured 2 mm. A follow-up CT chest is recommended in 3   months to assess for stability.    Small bilateral pleural effusions.    < end of copied text >    Imaging Personally Reviewed:  Electrocardiogram Personally Reviewed:    COORDINATION OF CARE:  Care Discussed with Consultants/Other Providers [Y/N]:  Prior or Outpatient Records Reviewed [Y/N]:

## 2022-11-18 NOTE — PROGRESS NOTE ADULT - PROBLEM SELECTOR PROBLEM 7
Chronic systolic congestive heart failure
History of BPH
Chronic systolic congestive heart failure
History of BPH
Chronic systolic congestive heart failure
History of BPH
History of BPH
Chronic systolic congestive heart failure
History of BPH
Chronic systolic congestive heart failure
Chronic systolic congestive heart failure

## 2022-11-18 NOTE — PROGRESS NOTE ADULT - NUTRITIONAL ASSESSMENT
This patient has been assessed with a concern for Malnutrition and has been determined to have a diagnosis/diagnoses of Moderate protein-calorie malnutrition.    This patient is being managed with:   Diet NPO after Midnight-     NPO Start Date: 03-Nov-2022   NPO Start Time: 23:59  Entered: Nov  3 2022  9:11AM    Diet Consistent Carbohydrate Renal w/Evening Snack-  Supplement Feeding Modality:  Oral  Ensure Enlive Cans or Servings Per Day:  2       Frequency:  Three Times a day  Entered: Oct 29 2022  2:35PM    
This patient has been assessed with a concern for Malnutrition and has been determined to have a diagnosis/diagnoses of Moderate protein-calorie malnutrition.    This patient is being managed with:   Diet NPO after Midnight-     NPO Start Date: 09-Nov-2022   NPO Start Time: 23:59  Entered: Nov 9 2022  2:54PM    Diet Clear Liquid-  Consistent Carbohydrate {Evening Snack} (CSTCHOSN)  1200mL Fluid Restriction ([id number])  No Concentrated Potassium  No Concentrated Phosphorus  Low Sodium  Entered: Nov 9 2022  2:53PM    
This patient has been assessed with a concern for Malnutrition and has been determined to have a diagnosis/diagnoses of Severe protein-calorie malnutrition.    This patient is being managed with:   Diet Regular-  Low Sodium  Supplement Feeding Modality:  Oral  Ensure Plus High Protein Cans or Servings Per Day:  1       Frequency:  Three Times a day  Entered: Nov 11 2022 11:02AM    
This patient has been assessed with a concern for Malnutrition and has been determined to have a diagnosis/diagnoses of Moderate protein-calorie malnutrition.    This patient is being managed with:   Diet Clear Liquid-  Consistent Carbohydrate {Evening Snack} (CSTCHOSN)  No Concentrated Potassium  No Concentrated Phosphorus  Low Sodium  Entered: Nov 9 2022  5:10PM    Diet NPO after Midnight-     NPO Start Date: 09-Nov-2022   NPO Start Time: 23:59  Entered: Nov 9 2022  2:54PM    
This patient has been assessed with a concern for Malnutrition and has been determined to have a diagnosis/diagnoses of Moderate protein-calorie malnutrition.    This patient is being managed with:   Diet NPO after Midnight-     NPO Start Date: 08-Nov-2022   NPO Start Time: 23:59  Entered: Nov 8 2022  8:33AM    
This patient has been assessed with a concern for Malnutrition and has been determined to have a diagnosis/diagnoses of Severe protein-calorie malnutrition.    This patient is being managed with:   Diet Regular-  Low Sodium  Supplement Feeding Modality:  Oral  Ensure Plus High Protein Cans or Servings Per Day:  1       Frequency:  Three Times a day  Entered: Nov 11 2022 11:02AM    
This patient has been assessed with a concern for Malnutrition and has been determined to have a diagnosis/diagnoses of Severe protein-calorie malnutrition.    This patient is being managed with:   Diet Regular-  Low Sodium  Supplement Feeding Modality:  Oral  Ensure Plus High Protein Cans or Servings Per Day:  1       Frequency:  Three Times a day  Entered: Nov 11 2022 11:02AM    
This patient has been assessed with a concern for Malnutrition and has been determined to have a diagnosis/diagnoses of Moderate protein-calorie malnutrition.    This patient is being managed with:   Diet NPO-  Except Medications  Entered: Nov 7 2022  4:46AM    
This patient has been assessed with a concern for Malnutrition and has been determined to have a diagnosis/diagnoses of Moderate protein-calorie malnutrition.    This patient is being managed with:   Diet Clear Liquid-  Consistent Carbohydrate {Evening Snack} (CSTCHOSN)  No Concentrated Potassium  No Concentrated Phosphorus  Low Sodium  Entered: Nov 9 2022  5:10PM    
This patient has been assessed with a concern for Malnutrition and has been determined to have a diagnosis/diagnoses of Severe protein-calorie malnutrition.    This patient is being managed with:   Diet Regular-  Low Sodium  Supplement Feeding Modality:  Oral  Ensure Plus High Protein Cans or Servings Per Day:  1       Frequency:  Three Times a day  Entered: Nov 11 2022 11:02AM    
This patient has been assessed with a concern for Malnutrition and has been determined to have a diagnosis/diagnoses of Moderate protein-calorie malnutrition.    This patient is being managed with:   Diet Consistent Carbohydrate Renal w/Evening Snack-  Supplement Feeding Modality:  Oral  Ensure Enlive Cans or Servings Per Day:  2       Frequency:  Three Times a day  Entered: Oct 29 2022  2:35PM    
This patient has been assessed with a concern for Malnutrition and has been determined to have a diagnosis/diagnoses of Severe protein-calorie malnutrition.    This patient is being managed with:   Diet Regular-  Low Sodium  Supplement Feeding Modality:  Oral  Ensure Plus High Protein Cans or Servings Per Day:  1       Frequency:  Three Times a day  Entered: Nov 11 2022 11:02AM    
This patient has been assessed with a concern for Malnutrition and has been determined to have a diagnosis/diagnoses of Moderate protein-calorie malnutrition.    This patient is being managed with:   Diet NPO after Midnight-     NPO Start Date: 06-Nov-2022   NPO Start Time: 23:59  Entered: Nov 6 2022  2:40PM    Diet Consistent Carbohydrate Renal w/Evening Snack-  Supplement Feeding Modality:  Oral  Ensure Enlive Cans or Servings Per Day:  2       Frequency:  Three Times a day  Entered: Oct 29 2022  2:35PM    
This patient has been assessed with a concern for Malnutrition and has been determined to have a diagnosis/diagnoses of Moderate protein-calorie malnutrition.    This patient is being managed with:   Diet NPO-  Except Medications  Entered: Nov 7 2022  4:46AM    
This patient has been assessed with a concern for Malnutrition and has been determined to have a diagnosis/diagnoses of Moderate protein-calorie malnutrition.    This patient is being managed with:   Diet NPO-  Except Medications  With Ice Chips/Sips of Water  Entered: Nov 8 2022 11:07AM    Diet NPO after Midnight-     NPO Start Date: 08-Nov-2022   NPO Start Time: 23:59  Entered: Nov 8 2022  8:33AM    
This patient has been assessed with a concern for Malnutrition and has been determined to have a diagnosis/diagnoses of Severe protein-calorie malnutrition.    This patient is being managed with:   Diet Regular-  Low Sodium  Supplement Feeding Modality:  Oral  Ensure Plus High Protein Cans or Servings Per Day:  1       Frequency:  Three Times a day  Entered: Nov 11 2022 11:02AM    
This patient has been assessed with a concern for Malnutrition and has been determined to have a diagnosis/diagnoses of Severe protein-calorie malnutrition.    This patient is being managed with:   Diet Regular-  Low Sodium  Supplement Feeding Modality:  Oral  Ensure Plus High Protein Cans or Servings Per Day:  1       Frequency:  Three Times a day  Entered: Nov 11 2022 11:02AM    
This patient has been assessed with a concern for Malnutrition and has been determined to have a diagnosis/diagnoses of Moderate protein-calorie malnutrition.    This patient is being managed with:   Diet Consistent Carbohydrate Renal w/Evening Snack-  Supplement Feeding Modality:  Oral  Ensure Enlive Cans or Servings Per Day:  2       Frequency:  Three Times a day  Entered: Oct 29 2022  2:35PM

## 2022-11-18 NOTE — PROGRESS NOTE ADULT - PROBLEM SELECTOR PLAN 7
c/w tamsulosin      # DVT PPx: Heparin AC
c/w tamsulosin  PT --> [facility]    # DVT PPx  Hold xarelto and start a hep gtt.     Discussed with Plastic Surgery-patient to remain under Plastics Service
c/w tamsulosin  PT --> [facility]    # DVT PPx  INR elevated - hold a/c
c/w tamsulosin      # DVT PPx  INR elevated - hold a/c
c/w tamsulosin      # DVT PPx  INR elevated - hold a/c
c/w tamsulosin  PT --> [facility]    # DVT PPx  Hold xarelto and start a hep gtt.     Discussed with Plastic Surgery-patient to remain under Plastics Service
c/w tamsulosin  PT --> [facility]    # DVT PPx  Currently on heparin SQ, should ideally be on LMWH given underlying malignancy and once safe from surgical perspective should be on full dose AC given Portal vein thrombosis (DOAC)      Discussed with Plastic Surgery-patient to remain under Plastics Service
c/w tamsulosin  PT --> [facility]    # DVT PPx  Xarelto      Discussed with Plastic Surgery-patient to remain under Plastics Service
c/w tamsulosin      # DVT PPx: Heparin AC
c/w tamsulosin  PT --> [facility]    # DVT PPx  Currently on heparin SQ, should ideally be on LMWH given underlying malignancy and once safe from surgical perspective should be on full dose AC given Portal vein thrombosis (DOAC)      Discussed with Plastic Surgery-patient to remain under Plastics Service
c/w tamsulosin  PT --> [facility]    # DVT PPx  Currently on heparin SQ, should ideally be on LMWH given underlying malignancy and once safe from surgical perspective should be on full dose AC given Portal vein thrombosis (DOAC)      Discussed with Plastic Surgery-patient to remain under Plastics Service
c/w tamsulosin      # DVT PPx: Heparin AC
c/w tamsulosin  PT --> [facility]    # DVT PPx  Xarelto      Discussed with Plastic Surgery-patient to remain under Plastics Service
Not in exacerbation, appears generally euvolemic,   -Lasix  toprol, entresto on hold d.t hypotension   - Outpatient cardiology follow up     #Hx VT: C/w amiodarone, s/p AICD
Not in exacerbation, appears generally euvolemic,   [ ] Lasix on hold, resume when necessary   - Cont toprol, entresto with hold parameters  - Outpatient cardiology follow up     #Hx VT: C/w amiodarone, s/p AICD
c/w tamsulosin      # DVT PPx  INR elevated - hold a/c
Not in exacerbation, appears generally euvolemic,   -Lasix  toprol, entresto on hold d.t hypotension   - Outpatient cardiology follow up     #Hx VT: C/w amiodarone, s/p AICD
Not in exacerbation, appears generally euvolemic,   [ ] Lasix on hold, resume when able   - Cont toprol, entresto with hold parameters  - Outpatient cardiology follow up     #Hx VT: C/w amiodarone, s/p AICD
Not in exacerbation, appears generally euvolemic,   -Lasix  toprol, entresto on hold d.t hypotension   - Outpatient cardiology follow up     #Hx VT: C/w amiodarone, s/p AICD
Not in exacerbation, appears generally euvolemic,   [ ] Lasix on hold, resume when necessary   - Cont toprol, entresto with hold parameters  - Outpatient cardiology follow up     #Hx VT: C/w amiodarone, s/p AICD
Not in exacerbation, appears generally euvolemic,   -Lasix  toprol, entresto on hold d.t hypotension   - Outpatient cardiology follow up     #Hx VT: C/w amiodarone, s/p AICD
Not in exacerbation, appears generally euvolemic,   [ ] Lasix on hold, resume when necessary   - Cont toprol, entresto with hold parameters  - Outpatient cardiology follow up     #Hx VT: C/w amiodarone, s/p AICD

## 2022-11-18 NOTE — PROGRESS NOTE ADULT - PROBLEM SELECTOR PROBLEM 9
History of BPH
History of BPH
Hypothyroid
Hypothyroid
History of BPH
History of BPH
Hypothyroid
Hypothyroid

## 2022-11-18 NOTE — PROGRESS NOTE ADULT - PROBLEM SELECTOR PROBLEM 4
Anemia
Hypertension
Anemia
Open abdominal wall wound
Palliative care encounter
Anemia
Anemia
Open abdominal wall wound
Anemia
Hypertension
Anemia
Open abdominal wall wound
Anemia
Open abdominal wall wound
Anemia
Open abdominal wall wound

## 2022-11-18 NOTE — PROGRESS NOTE ADULT - PROBLEM SELECTOR PROBLEM 3
Chronic systolic congestive heart failure
Portal vein thrombosis
Chronic systolic congestive heart failure
Portal vein thrombosis
Chronic systolic congestive heart failure
Pancreatic cancer
Chronic systolic congestive heart failure
Portal vein thrombosis
Chronic systolic congestive heart failure
Portal vein thrombosis
Chronic systolic congestive heart failure
Portal vein thrombosis
Portal vein thrombosis
Chronic systolic congestive heart failure
Portal vein thrombosis
Portal vein thrombosis

## 2022-11-18 NOTE — PROGRESS NOTE ADULT - PROBLEM SELECTOR PROBLEM 5
Hypothyroid
Hypertension
Diarrhea
Hypertension
Diarrhea
Hypertension
Hypertension
Diarrhea
Diarrhea
Hypothyroid
Hypertension
Diarrhea
Hypertension
Diarrhea

## 2022-11-18 NOTE — DISCHARGE NOTE NURSING/CASE MANAGEMENT/SOCIAL WORK - NSDCFUADDAPPT_GEN_ALL_CORE_FT
Please follow up with surgical oncologist Dr. Mercer upon discharge     Please follow up with the plastic surgeon Dr. Gallegos within 1-2 weeks of discharge

## 2022-11-18 NOTE — PROGRESS NOTE ADULT - PROBLEM SELECTOR PLAN 6
Baseline seems to be around 8-9. slowly down trending. possibly d/t slow blood loss from abd wound  - Iron studies c/w RYAN- consider IV iron once infectious etiology ruled out   -Received 3U PRBC on this admission, H/H slowly down-trending though no sig bleeding noted, no blood in stool   -transfuse if hgb<7
C/w synthroid 25mcg qd
Baseline seems to be around 8-9. slowly down trending. possibly d/t slow blood loss from abd wound  - Iron studies c/w RYAN- consider IV iron once infectious etiology ruled out   -Received 3U PRBC on this admission, H/H slowly down-trending though no sig bleeding noted, no blood in stool   -transfuse if hgb<7  [ ] Retic, LDH, Haptoglobin
Baseline seems to be around 8-9. slowly down trending. possibly d/t slow blood loss from abd wound  - Iron studies c/w RYAN- consider IV iron once infectious etiology ruled out   -Received 3U PRBC on this admission, H/H slowly down-trending though no sig bleeding noted, no blood in stool   -transfuse if hgb<7  [ ] Retic, LDH, Haptoglobin
Baseline seems to be around 8-9. slowly down trending. possibly d/t slow blood loss from abd wound  - Iron studies c/w RYAN- consider IV iron once infectious etiology ruled out   -Received 3U PRBC on this admission, H/H slowly down-trending though no sig bleeding noted, no blood in stool   -transfuse if hgb<7
c/w tamsulosin
C/w synthroid 25mcg qd
Baseline seems to be around 8-9. slowly down trending. possibly d/t slow blood loss from abd wound  - Iron studies c/w RYAN- consider IV iron once infectious etiology ruled out   -Received 3U PRBC on this admission, H/H slowly down-trending though no sig bleeding noted, no blood in stool   -transfuse if hgb<7
C/w synthroid 25mcg qd
c/w tamsulosin      # DVT PPx  Currently on heparin SQ, should ideally be on LMWH given underlying malignancy and once safe from surgical perspective should be on full dose AC given Portal vein thrombosis (DOAC)
Baseline seems to be around 8-9. slowly down trending. possibly d/t slow blood loss from abd wound  - Iron studies c/w RYAN- consider IV iron once infectious etiology ruled out   -Received 3U PRBC on this admission, H/H slowly down-trending though no sig bleeding noted, no blood in stool   -transfuse if hgb<7
C/w synthroid 25mcg qd
Baseline seems to be around 8-9. slowly down trending. possibly d/t slow blood loss from abd wound  -Received 3U PRBC on this admission.   -transfuse if hgb<7
Baseline seems to be around 8-9. slowly down trending. possibly d/t slow blood loss from abd wound  - Iron studies c/w RYAN- consider IV iron once infectious etiology ruled out   -Received 3U PRBC on this admission, H/H slowly down-trending though no sig bleeding noted, no blood in stool   -transfuse if hgb<7  [ ] Retic, LDH, Haptoglobin

## 2022-11-18 NOTE — PROGRESS NOTE ADULT - PROBLEM SELECTOR PLAN 5
With episodes of hypotension here. Currently asymptomatic, recent -120s. Continues on lasix, toprol, entresto with holding parameters
With episodes of hypotension here. Currently asymptomatic, recent -120s. Continues on lasix, toprol, entresto with holding parameters
With episodes of hypotension here. Currently asymptomatic. BP still soft.   -holding lasix  -c/w  toprol,  entresto with holding parameters
With episodes of hypotension here. Currently asymptomatic, recent -120s. Continues on lasix, toprol, entresto with holding parameters
C/w synthroid 25mcg qd
C/w synthroid 25mcg qd
2d of diarrhea, now improved. GI PCR positive for ETEC though patient has been hospitalized for 2.5 weeks- suspect false positive   - Self resolved  - Hold on Azithromycin
With episodes of hypotension here. Currently asymptomatic, recent -120s. Continues on lasix, toprol, entresto with holding parameters
With episodes of hypotension here. Currently asymptomatic. BP still soft.   -holding lasix  -c/w  toprol,  entresto with holding parameters
With episodes of hypotension here. Currently asymptomatic. BP still soft.   -holding lasix  -c/w  toprol,  entresto with holding parameters
2d of diarrhea, now improved. GI PCR positive for ETEC though patient has been hospitalized for 2.5 weeks- suspect false positive   - Self resolved  - Hold on Azithromycin
2d of diarrhea, now improved. GI PCR positive for ETEC though patient has been hospitalized for 2.5 weeks- suspect false positive   - Self resolved  - Hold on Azithromycin
With episodes of hypotension here. Currently asymptomatic. BP still soft.   -holding lasix, toprol,   -c/w entresto with holding parameters
2d of diarrhea, now improved. GI PCR positive for ETEC though patient has been hospitalized for 2.5 weeks- suspect false positive   - Self resolved  - Hold on Azithromycin
With episodes of hypotension here. Currently asymptomatic, recent -120s. Continues on lasix, toprol, entresto with holding parameters
With episodes of hypotension here. Currently asymptomatic. BP still soft.   -holding lasix  -c/w  toprol,  entresto with holding parameters
With episodes of hypotension here. Currently asymptomatic. BP still soft.   -holding lasix  -c/w  toprol,  entresto with holding parameters
2d of diarrhea, now improved. GI PCR positive for ETEC though patient has been hospitalized for 2.5 weeks- suspect false positive   - Self resolved  - Hold on Azithromycin
With episodes of hypotension here. Currently asymptomatic, recent -120s. Continues on lasix, toprol, entresto with holding parameters
2d of diarrhea, now improved. GI PCR positive for ETEC though patient has been hospitalized for 2.5 weeks- suspect false positive   - Self resolved  - Hold on Azithromycin

## 2022-11-18 NOTE — PROGRESS NOTE ADULT - REASON FOR ADMISSION
Postoperative wound bleed

## 2022-11-18 NOTE — PROGRESS NOTE ADULT - NS ATTEND OPT1 GEN_ALL_CORE

## 2022-11-18 NOTE — PROGRESS NOTE ADULT - PROBLEM SELECTOR PROBLEM 6
History of BPH
Anemia
Anemia
Hypothyroid
Anemia
Hypothyroid
Anemia
Hypothyroid
Anemia
Anemia
Hypothyroid
History of BPH
Hypothyroid
Anemia
Hypothyroid
Anemia

## 2022-11-18 NOTE — PROGRESS NOTE ADULT - PROBLEM SELECTOR PLAN 8
C/w synthroid 25mcg qd
With episodes of hypotension here. Currently asymptomatic.  - Hold Lasix   - Cont toprol, entresto with hold parameters (has not gotten 2/2 low BP)
C/w synthroid 25mcg qd
With episodes of hypotension here. Currently asymptomatic.  - Hold Lasix   - Cont toprol, entresto with hold parameters (has not gotten 2/2 low BP)

## 2022-11-18 NOTE — PROGRESS NOTE ADULT - SUBJECTIVE AND OBJECTIVE BOX
INTERVAL HPI/OVERNIGHT EVENTS:  Patient seen at bedside.  Patient with no acute complaints  Pending discharge to rehab today      VITAL SIGNS:  T(F): 97.5 (11-18-22 @ 09:05)  HR: 89 (11-18-22 @ 09:05)  BP: 109/58 (11-18-22 @ 09:05)  RR: 17 (11-18-22 @ 09:05)  SpO2: 97% (11-18-22 @ 09:05)  Wt(kg): --    PHYSICAL EXAM:  CONSTITUTIONAL: Elderly man in NAD,  EYES: PERRLA; conjunctiva and sclera clear  ENMT: Moist oral mucosa, no pharyngeal injection or exudates;   NECK: Supple, no palpable masses;  RESPIRATORY: Normal respiratory effort; lungs are clear to auscultation bilaterally  CARDIOVASCULAR: Regular rate and rhythm, normal S1 and S2, no murmur/rub/gallop; No lower extremity edema; Peripheral pulses are 2+ bilaterally  ABDOMEN: Nontender to palpation, normoactive bowel sounds, no rebound/guarding;   MUSCLOSKELETAL:   no clubbing or cyanosis of digits; no joint swelling or tenderness to palpation  PSYCH: A+O to person, place, and time; affect appropriate  NEUROLOGY:  no gross sensory deficits;   SKIN: abd wound dressing c/d/i       MEDICATIONS  (STANDING):  acetaminophen     Tablet .. 650 milliGRAM(s) Oral every 6 hours  ALPRAZolam 0.25 milliGRAM(s) Oral at bedtime  aMIOdarone    Tablet 200 milliGRAM(s) Oral daily  DULoxetine 60 milliGRAM(s) Oral daily  enoxaparin Injectable 85 milliGRAM(s) SubCutaneous every 12 hours  gabapentin 100 milliGRAM(s) Oral three times a day  levothyroxine 25 MICROGram(s) Oral daily  pancrelipase  (CREON 24,000 Lipase Units) 3 Capsule(s) Oral three times a day with meals  pantoprazole    Tablet 40 milliGRAM(s) Oral before breakfast  tamsulosin 0.4 milliGRAM(s) Oral at bedtime    MEDICATIONS  (PRN):  artificial tears (preservative free) Ophthalmic Solution 1 Drop(s) Both EYES every 2 hours PRN Dry Eyes  diphenhydrAMINE 25 milliGRAM(s) Oral every 6 hours PRN Rash and/or Itching  LORazepam   Injectable 0.25 milliGRAM(s) IV Push every 6 hours PRN Nausea and/or Vomiting  melatonin 3 milliGRAM(s) Oral at bedtime PRN Insomnia  oxyCODONE    IR 5 milliGRAM(s) Oral every 4 hours PRN moderate to severe pain  simethicone 80 milliGRAM(s) Chew three times a day PRN Gas      Allergies    No Known Allergies    Intolerances        LABS:                        9.2    3.48  )-----------( 148      ( 18 Nov 2022 05:52 )             28.5     11-18    133<L>  |  100  |  9   ----------------------------<  118<H>  4.1   |  23  |  0.52    Ca    9.0      18 Nov 2022 05:52  Phos  2.9     11-18  Mg     1.70     11-18    TPro  6.4  /  Alb  2.8<L>  /  TBili  0.8  /  DBili  x   /  AST  17  /  ALT  11  /  AlkPhos  101  11-18          RADIOLOGY & ADDITIONAL TESTS:  Studies reviewed.

## 2022-11-18 NOTE — DISCHARGE NOTE NURSING/CASE MANAGEMENT/SOCIAL WORK - NSDCPNINST_GEN_ALL_CORE
Please continue to take all medications as prescribed. If you notice any signs/symptoms of bleeding or signs/symptoms of infection such as fever >100.3 please notify your doctor right away. You may resume your regular diet. Please perform abdominal wound care as follows:  Please continue to take all medications as prescribed. If you notice any signs/symptoms of bleeding or signs/symptoms of infection such as fever >100.3 please notify your doctor right away. You may resume your regular diet. Continue to take medications as prescribed by your provider. Please perform abdominal wound care as follows: Cleanse w/ NS, pat dry & apply Adaptic dressing, cover w/ ABD pad. Must be performed daily & changed as needed.   You have a follow-up appointment scheduled with Dr. Juan on November 11, 2022. Please continue to take all medications as prescribed. If you notice any signs/symptoms of bleeding or signs/symptoms of infection such as fever >100.3 please notify your doctor right away. You may resume your regular diet. Continue to take medications as prescribed by your provider. Please perform abdominal wound care as follows: Cleanse w/ NS, pat dry & apply Adaptic dressing, cover w/ ABD pad. Must be performed daily & changed as needed.

## 2022-11-18 NOTE — PROGRESS NOTE ADULT - PROVIDER SPECIALTY LIST ADULT
Gastroenterology
Heme/Onc
Heme/Onc
Palliative Care
Plastic Surgery
Gastroenterology
Heme/Onc
Heme/Onc
Hospitalist
Plastic Surgery
Plastic Surgery
Gastroenterology
Gastroenterology
Heme/Onc
Plastic Surgery
Hospitalist
Hospitalist
Plastic Surgery
Plastic Surgery
Hospitalist

## 2022-11-18 NOTE — DISCHARGE NOTE NURSING/CASE MANAGEMENT/SOCIAL WORK - PATIENT PORTAL LINK FT
You can access the FollowMyHealth Patient Portal offered by [facility] by registering at the following website: [email or web address]. By joining [facility]’s FollowMyHealth portal, you will also be able to view your health information using other applications (apps) compatible with our system.

## 2022-11-18 NOTE — PROGRESS NOTE ADULT - PROBLEM SELECTOR PLAN 4
Required wound vac, now removed on 11/10, managed by Plastic surgery   - Cont dressing   - Plastics following
Baseline seems to be around 8-9. Dropped to 7 today. Iron studies indicative of iron deficiency anemia. S/p 1U PRBC and Hgh now 7.8.   - Oncology on board  - Recommend iron supplementation on discharge and follow up outpatient to monitor anemia
Required wound vac, now removed on 11/10, managed by Plastic surgery   - Cont dressing   - Plastics following
Baseline seems to be around 8-9. slowly down trending. possibly d/t slow blood loss from abd wound  -Received 2U PRBC on this admission.   -transfuse if hgb<7
Baseline seems to be around 8-9. Dropped to 7 today. Iron studies indicative of iron deficiency anemia.   - Oncology on board  - Recommend iron supplementation - IV vs PO   - Transfuse 1U PRBC and repeat CBC
Baseline seems to be around 8-9. slowly down trending. possibly d/t slow blood loss from abd wound  -Received 2U PRBC on this admission.   -transfuse if hgb<7
Baseline seems to be around 8-9. Dropped to 7 today. Iron studies indicative of iron deficiency anemia. S/p 1U PRBC and Hgh now 7.8.   - Oncology on board  - Recommend iron supplementation on discharge and follow up outpatient to monitor anemia
Baseline seems to be around 8-9. slowly down trending. possibly d/t slow blood loss from abd wound  -Received 2U PRBC on this admission.   -transfuse if hgb<7
Required wound vac, now removed on 11/10, managed by Plastic surgery   - Cont dressing   - Plastics following
With episodes of hypotension here. Currently asymptomatic, recent -120s. Continues on lasix, toprol, entresto with holding parameters
Baseline seems to be around 8-9. Dropped to 7 today. Iron studies indicative of iron deficiency anemia. S/p 1U PRBC and Hgh now 7.8.   - Oncology on board  - Recommend iron supplementation on discharge and follow up outpatient to monitor anemia
Required wound vac, now removed on 11/10, managed by Plastic surgery   - Cont dressing   - Plastics following
Required wound vac, now removed on 11/10, managed by Plastic surgery   - Cont dressing   - Plastics following
Baseline seems to be around 8-9. Dropped to 7 today. Iron studies indicative of iron deficiency anemia.   - Oncology on board  - Recommend iron supplementation - IV vs PO   - Transfuse 1U PRBC and repeat CBC
Baseline seems to be around 8-9. slowly down trending. possibly d/t slow blood loss from abd wound  -Received 2U PRBC on this admission.   -transfuse if hgb<7    # Diarrhea   - GI PCR positive for ETEC however, patient has been hospitalized for >2weeks, unclear where he would have contracted ETEC. Higher rates of false positive ETEC in the hospital, may not represent true infection.   - Last BM yesterday, none since, will hold off on tx for now given clinical context  - C diff negative   - 250cc IVF PRN low BP   - Patient appears to be asymptomatic from low BP
Baseline seems to be around 8-9. slowly down trending. possibly d/t slow blood loss from abd wound  -Received 1U PRBC on this admission.   -transfuse if hgb<7
Please page for any uncontrolled symptoms #[id number]  PT IS FULL CODE
With episodes of hypotension here. Currently asymptomatic, recent -120s. Continues on lasix, toprol, entresto with hold parameters
Baseline seems to be around 8-9. Dropped to 7 today. Iron studies indicative of iron deficiency anemia. S/p 1U PRBC and Hgh now 7.8.   - Oncology on board  - Recommend iron supplementation on discharge and follow up outpatient to monitor anemia
Baseline seems to be around 8-9. slowly down trending. possibly d/t slow blood loss from abd wound  -Received 1U PRBC on this admission.   -transfuse if hgb<7
Baseline seems to be around 8-9. Stable. Received 1U PRBC on this admission. Iron deficiency anemia.   - Oncology on board  - Recommend iron supplementation on discharge and follow up outpatient to monitor anemia
Required wound vac, now removed on 11/10, managed by Plastic surgery   - Cont dressing   - Plastics following
Baseline seems to be around 8-9  Slight drop today to 7.2  - Can obtain iron studies in AM, likely AOCD  - Monitor H/H closely  - Keep Hg above 7

## 2022-11-18 NOTE — PROGRESS NOTE ADULT - PROBLEM SELECTOR PLAN 9
c/w tamsulosin      # DVT PPx: Full dose AC    Dispo: [facility]  Called Una on 11/17
C/w synthroid 25mcg qd
C/w synthroid 25mcg qd
c/w tamsulosin      # DVT PPx: Full dose AC    Dispo: [facility]
c/w tamsulosin      # DVT PPx: Full dose AC    Dispo: [facility]
C/w synthroid 25mcg qd
c/w tamsulosin      # DVT PPx: Full dose AC 35 minutes spent discharge planning.     Dispo: [facility] Repeat CT scan in 2/18 23 to evaluate for lung nodule   Called Una on 11/17
C/w synthroid 25mcg qd

## 2022-11-18 NOTE — PROGRESS NOTE ADULT - NS ATTEND AMEND GEN_ALL_CORE FT
Patient seen at bedside. Case discussed with RAQUEL Mendoza. Plan as above.     Concern for recurrence of pancreatic cancer given imaging findings, uptrending tumor markers and positive Signatera.   Would recommend biopsy of the soft tissue areas if this is feasible.   Would recommend full dose anticoagulation if cleared by plastic surgery given extensive portal vein and ?hepatic infarcts.   Follow CA 19-9  Consider checking lactate  Discussed with outpatient oncologist Dr Juan.
Patient seen at bedside. Case discussed with RAQUEL Mendoza. Plan as above.   Biopsy results reviewed with patient and daughter Una  CT chest with contrast to complete staging  Would recommend DC to rehab and follow up with outpatient oncologist, no chemotherapy while in rehab.
Patient seen at bedside. Case discussed with RAQUEL Mendoza. Plan as above.   Discussed results of CT chest with pt at bedside.   Planned for dc to rehab today.   Will follow with outpatient oncology.   No disease modifying treatment while in rehab.
82 yo M w/ HTN, HLD, HFrEF (EF 30-35%), VT, s/p ACID, AS s/p TAVR, CAD s/p stents, GERD, hypothyroid, anxiety, pancreatic ca s/p whipple 10/26/21, chemo and xrt with non-healing abdominal wound treated w/ HBOT and STSG 7/8/22, s/p repeat STSG wound vac placement on 9/29, p/w bleeding from abdominal incision site now s/p new wound vac this admission placed by Plastics.   CT abdomen pelvis concerning findings of thrombosed portal vein and new ascites, New wedge-shaped lesion throughout the liver, likely perfusion abnormalities related to new portal venous. Tumor markers also increased.   Palliative care consult for symptom management in the setting of advanced illness.   Symptoms: pt indicates that he currently feels more comfortable, would continue with same regimen. For discharge pt can take Oxycodone 5mg PO q4hrs PRN for breakthrough pain. Bowel regimen to prevent opioid induced constipation. For nausea can continue with Ativan 0.25mg IV q6hrs PRN for nausea/vomiting, on discharge can continue with PO as well. Other antiemetics not used given QTc.  His goal is for DMT pending bx results, oncology to f/u  At this time symptoms are controlled, goc addressed, will sign off, reconsult as needed.

## 2022-11-18 NOTE — PROGRESS NOTE ADULT - PROBLEM SELECTOR PLAN 3
Not in exacerbation, appears generally euvolemic,   -holding  lasix,   -c/w toprol,  entresto with holding parameters  - Outpatient cardiology follow up     #Hx VT: C/w amiodarone, s/p AICD
Not in exacerbation, appears generally euvolemic,   -holding  lasix, metoprolol d/t hypotension   -c/w entresto  - Outpatient cardiology follow up     #Hx VT: C/w amiodarone, s/p AICD
Not in exacerbation, appears generally euvolemic, c/w lasix, metoprolol, entresto  - Outpatient cardiology follow up     #Hx VT: C/w amiodarone, s/p AICD    #AS s/p TAVR/CAD: No acute issue, close outpatient Cardiology follow up after discharge
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 The portal splenic confluence and the central splenic vein are not well visualized and may be thrombosed.   - Started on Xarelto and then switched to Heparin post-procedure   - Switched to Lovenox, can switch back to Xarelto once H/H stable
Not in exacerbation, appears generally euvolemic, c/w lasix, metoprolol, entresto  - Outpatient cardiology follow up     #Hx VT: C/w amiodarone, s/p AICD    #AS s/p TAVR/CAD: No acute issue, close outpatient Cardiology follow up after discharge
Not in exacerbation, appears generally euvolemic, c/w lasix, metoprolol, entresto  - Outpatient cardiology follow up     #Hx VT: C/w amiodarone, s/p AICD    #AS s/p TAVR/CAD: No acute issue, close outpatient Cardiology follow up after discharge
Follows camila Juan   Pancreatic cancer s/p neoadjuvant gem/abraxane x 5 cycles completed in 9/21, s/p whipple operation 10/26/21. Pathology revealed pancreatic adenocarcinoma, 0/31LN, positive pancreatic resection margin requiring adjuvant RT (SBRT 4000 cGy in 5 fxs) completed in 1/19/22. Post op course c/b non healing abdominal wound, presenting for wound bleeding.   - F/U biopsy results  - Oncology following
Not in exacerbation, appears generally euvolemic, c/w lasix, metoprolol, entresto  - Outpatient cardiology follow up     #Hx VT: C/w amiodarone, s/p AICD    #AS s/p TAVR/CAD: No acute issue, close outpatient Cardiology follow up after discharge
Not in exacerbation, appears generally euvolemic, c/w lasix, metoprolol, entresto  - Outpatient cardiology follow up     #Hx VT: C/w amiodarone, s/p AICD    #AS s/p TAVR/CAD: No acute issue, close outpatient Cardiology follow up after discharge
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 The portal splenic confluence and the central splenic vein are not well visualized and may be thrombosed.   - Started on Xarelto and then switched to Heparin post-procedure   - Switched to Lovenox, can switch back to Xarelto once H/H stable
Not in exacerbation, appears generally euvolemic, c/w lasix, metoprolol, entresto  - Outpatient cardiology follow up     #Hx VT: C/w amiodarone, s/p AICD    #AS s/p TAVR/CAD: No acute issue, close outpatient Cardiology follow up after discharge
Not in exacerbation, appears generally euvolemic, c/w lasix, metoprolol, entresto  - Outpatient cardiology follow up     #Hx VT: C/w amiodarone, s/p AICD    #AS s/p TAVR/CAD: No acute issue, close outpatient Cardiology follow up after discharge
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 The portal splenic confluence and the central splenic vein are not well visualized and may be thrombosed.   - Started on Xarelto and then switched to Heparin post-procedure   - Switched to Lovenox, can switch back to Xarelto once H/H stable
Not in exacerbation, appears generally euvolemic,   -holding  lasix,   -c/w  toprol,  entresto with holding parameters  - Outpatient cardiology follow up     #Hx VT: C/w amiodarone, s/p AICD
Not in exacerbation, appears generally euvolemic,   -holding  lasix,   -c/w  toprol,  entresto with holding parameters  - Outpatient cardiology follow up     #Hx VT: C/w amiodarone, s/p AICD
Not in exacerbation, appears generally euvolemic, c/w lasix, metoprolol, entresto  - Outpatient cardiology follow up     #Hx VT: C/w amiodarone, s/p AICD    #AS s/p TAVR/ CAD: No acute issue, close outpatient Cardiology follow up after discharge
Not in exacerbation, appears generally euvolemic, c/w lasix, metoprolol, entresto  - Outpatient cardiology follow up     #Hx VT: C/w amiodarone, s/p AICD    #AS s/p TAVR/CAD: No acute issue, close outpatient Cardiology follow up after discharge
Not in exacerbation, appears generally euvolemic,   -holding  lasix,   -c/w  toprol,  entresto with holding parameters  - Outpatient cardiology follow up     #Hx VT: C/w amiodarone, s/p AICD
Not in exacerbation, appears generally euvolemic,   -holding  lasix,   -c/w  toprol,  entresto with holding parameters  - Outpatient cardiology follow up     #Hx VT: C/w amiodarone, s/p AICD
Not in exacerbation, appears generally euvolemic, c/w lasix, metoprolol, entresto  - Outpatient cardiology follow up     #Hx VT: C/w amiodarone, s/p AICD    #AS s/p TAVR/CAD: No acute issue, close outpatient Cardiology follow up after discharge
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 The portal splenic confluence and the central splenic vein are not well visualized and may be thrombosed.   - Started on Xarelto and then switched to Heparin post-procedure   - Cont Heparin, will transition to Xarelto once infectious workup/BP improved
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 The portal splenic confluence and the central splenic vein are not well visualized and may be thrombosed.   - Started on Xarelto and then switched to Heparin post-procedure   [ ] Switched to Lovenox, can switch back to Xarelto once H/H stable -> will switch for dc
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 The portal splenic confluence and the central splenic vein are not well visualized and may be thrombosed.   - Started on Xarelto and then switched to Heparin post-procedure   - Switched to Lovenox, can switch back to Xarelto once H/H stable
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 The portal splenic confluence and the central splenic vein are not well visualized and may be thrombosed.   - Started on Xarelto and then switched to Heparin post-procedure   [ ] Switched to Lovenox, can switch back to Xarelto once H/H stable
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 The portal splenic confluence and the central splenic vein are not well visualized and may be thrombosed.   - Started on Xarelto and then switched to Heparin post-procedure   - Switched to Lovenox, can switch back to Xarelto once H/H stable

## 2022-11-18 NOTE — PROGRESS NOTE ADULT - SUBJECTIVE AND OBJECTIVE BOX
Plastic Surgery Progress Note (pg [facility]: [id number], [facility]: [phone])    SUBJECTIVE  The patient was seen and examined. He is resting comfortably bedside.    OBJECTIVE  ___________________________________________________  VITAL SIGNS / I&O's   Vital Signs Last 24 Hrs  T(C): 36.3 (18 Nov 2022 06:47), Max: 37.1 (17 Nov 2022 13:10)  T(F): 97.3 (18 Nov 2022 06:47), Max: 98.8 (17 Nov 2022 13:10)  HR: 94 (18 Nov 2022 06:47) (83 - 96)  BP: 108/51 (18 Nov 2022 06:47) (107/52 - 123/52)  BP(mean): --  RR: 18 (18 Nov 2022 06:47) (16 - 18)  SpO2: 99% (18 Nov 2022 06:47) (96% - 100%)    Parameters below as of 18 Nov 2022 01:52  Patient On (Oxygen Delivery Method): room air          17 Nov 2022 07:01  -  18 Nov 2022 07:00  --------------------------------------------------------  IN:  Total IN: 0 mL    OUT:    Voided (mL): 1100 mL  Total OUT: 1100 mL    Total NET: -1100 mL        ___________________________________________________  PHYSICAL EXAM    -- CONSTITUTIONAL: NAD, lying in bed  -- NEURO: Awake, alert  -- CARD: hemodynamically stable  -- PULM: Non-labored respirations  -- ABDOMEN:     ___________________________________________________  LABS                        9.2    3.48  )-----------( 148      ( 18 Nov 2022 05:52 )             28.5     18 Nov 2022 05:52    133    |  100    |  9      ----------------------------<  118    4.1     |  23     |  0.52     Ca    9.0        18 Nov 2022 05:52  Phos  2.9       18 Nov 2022 05:52  Mg     1.70      18 Nov 2022 05:52    TPro  6.4    /  Alb  2.8    /  TBili  0.8    /  DBili  x      /  AST  17     /  ALT  11     /  AlkPhos  101    18 Nov 2022 05:52        ___________________________________________________  MEDICATIONS  (STANDING):  acetaminophen     Tablet .. 650 milliGRAM(s) Oral every 6 hours  ALPRAZolam 0.25 milliGRAM(s) Oral at bedtime  aMIOdarone    Tablet 200 milliGRAM(s) Oral daily  DULoxetine 60 milliGRAM(s) Oral daily  enoxaparin Injectable 85 milliGRAM(s) SubCutaneous every 12 hours  gabapentin 100 milliGRAM(s) Oral three times a day  influenza  Vaccine (HIGH DOSE) 0.7 milliLiter(s) IntraMuscular once  levothyroxine 25 MICROGram(s) Oral daily  pancrelipase  (CREON 24,000 Lipase Units) 3 Capsule(s) Oral three times a day with meals  pantoprazole    Tablet 40 milliGRAM(s) Oral before breakfast  tamsulosin 0.4 milliGRAM(s) Oral at bedtime    MEDICATIONS  (PRN):  artificial tears (preservative free) Ophthalmic Solution 1 Drop(s) Both EYES every 2 hours PRN Dry Eyes  diphenhydrAMINE 25 milliGRAM(s) Oral every 6 hours PRN Rash and/or Itching  LORazepam   Injectable 0.25 milliGRAM(s) IV Push every 6 hours PRN Nausea and/or Vomiting  melatonin 3 milliGRAM(s) Oral at bedtime PRN Insomnia  oxyCODONE    IR 5 milliGRAM(s) Oral every 4 hours PRN moderate to severe pain  simethicone 80 milliGRAM(s) Chew three times a day PRN Gas

## 2022-11-18 NOTE — PROGRESS NOTE ADULT - PROBLEM SELECTOR PLAN 1
With new wound vac, continued management per plastic surgery  - C/w pain control  - C/w wound vac per plastics  - PT follow up ([facility])
With new wound vac, continued management per plastic surgery  - C/w pain control  - C/w wound vac per plastics  - PT follow up ([facility])
Pt denies nausea at this time  EKG 11/8 -    Would NOT recommend continuation of Zofran or Reglan  - Rotate to PRN Ativan 0.25mg IVP Q6hrs
With new wound vac, continued management per plastic surgery  - C/w pain control  - C/w wound vac per plastics
With new wound vac, continued management per plastic surgery  - C/w pain control: Oxy 5 Q4H PRN  - C/w wound vac per plastics- plan to possibly d/c on Friday   - Odor noted however no fistula noted on exam with Plastics     # PVT   - Xarelto on hold   - Resume Heparin drip for now pending procedure
With new wound vac, continued management per plastic surgery  - C/w pain control  - C/w wound vac per plastics
With new wound vac, continued management per plastic surgery  - C/w pain control  - C/w wound vac per plastics  - PT follow up ([facility])
- s/p whipple, chemo and RT 10/21  - CT a/p this admission c/w PVT and liver lesion   - S/p EUS 11/10 w/ biopsies -path +adenocarcinoma c/w recurrence of disease  - obtain CT chest for staging   - case discussed with medical oncology- offered palliative chemo outpt after rehab. also discussed with surgical oncology Dr. Mercer- rec outpt f/u. above recommendation discussed with pt, daughter Pippa Heart and son Chandler who are in agreement
- s/p whipple, chemo and RT 10/21  - CT a/p this admission c/w PVT and liver lesion   - S/p EUS 11/10 w/ biopsies -path +adenocarcinoma c/w recurrence of disease  [ ] obtain CT chest for staging: Indeterminant 4 mm right apical nodule is increased in size since   6/16/2022 when it measured 2 mm. A follow-up CT chest is recommended in 3   months to assess for stability.  - case discussed with medical oncology- offered palliative chemo outpt after rehab. also discussed with surgical oncology Dr. Mercer- rec outpt f/u. above recommendation discussed with pt, daughter Pippa Heart and son Chandler who are in agreement
With new wound vac, continued management per plastic surgery  - C/w pain control  - C/w wound vac per plastics  - PT follow up ([facility])
With new wound vac, continued management per plastic surgery  - C/w pain control  - C/w wound vac per plastics  - PT follow up ([facility])  - Patient transferred to the medicine service yesterday to continue pursing oncological work up.
With new wound vac, continued management per plastic surgery
- s/p whipple, chemo and RT 10/21  - CT a/p this admission c/w PVT and liver lesion   - S/p EUS 11/10 w/ biopsies -path +adenocarcinoma c/w recurrence of disease  [ ] obtain CT chest for staging   - case discussed with medical oncology- offered palliative chemo outpt after rehab. also discussed with surgical oncology Dr. Mercer- rec outpt f/u. above recommendation discussed with pt, daughter Pippa Heart and son Chandler who are in agreement
With new wound vac, continued management per plastic surgery  - C/w pain control  - C/w wound vac per plastics  - PT follow up ([facility])
- s/p whipple, chemo and RT 10/21  - CT a/p this admission c/w PVT and liver lesion   - S/p EUS 11/10 w/ biopsies -path +adenocarcinoma c/w recurrence of disease  - obtain CT chest for staging   - case discussed with medical oncology- offered palliative chemo outpt after rehab. also discussed with surgical oncology Dr. Mercer- rec outpt f/u. above recommendation discussed with pt, daughter Pippa Heart and son Chandler who are in agreement
With new wound vac, continued management per plastic surgery  - C/w pain control  - C/w wound vac per plastics  - PT follow up ([facility])
With new wound vac, continued management per plastic surgery  - C/w pain control: Oxy 5 Q4H PRN  - C/w wound vac per plastics    # PVT   - Xarelto on hold   - Resume Heparin drip for now pending procedure
With new wound vac, continued management per plastic surgery  - C/w pain control  - C/w wound vac per plastics  - PT follow up ([facility])  - Patient transferred to the medicine service yesterday to continue pursing oncological work up.
With new wound vac, continued management per plastic surgery  - C/w pain control  - C/w wound vac per plastics  - PT follow up ([facility])
With new wound vac, continued management per plastic surgery  - C/w pain control: Oxy 5 Q4H PRN started   - C/w wound vac per plastics    # PVT   - Xarelto on hold   - Resume Heparin post procedure
With new wound vac, continued management per plastic surgery  - C/w pain control: Oxy 5 Q4H PRN  - C/w wound vac per plastics- plan to possibly d/c on Friday   - Odor noted however no fistula noted on exam with Plastics     # PVT   - Xarelto on hold   - Resume Heparin post-procedure
- s/p whipple, echo and RT 10/21  - CT a/p this admission c/w PVT and liver lesion concern for cancer recurrence   - S/p EUS 11/10 w/ biopsies   - Oncology aware, follow up EUS biopsy report- given wound is healing well- may be a candidate for chemotherapy  [ ] Path
- s/p whipple, echo and RT 10/21  - CT a/p this admission c/w PVT and liver lesion concern for cancer recurrence   - S/p EUS 11/10 w/ biopsies   - Oncology aware, follow up EUS biopsy report- given wound is healing well- may be a candidate for chemotherapy  - path from ascites is neg for malignant cells - await EUS path

## 2022-11-18 NOTE — DISCHARGE NOTE NURSING/CASE MANAGEMENT/SOCIAL WORK - NSDCPEFALRISK_GEN_ALL_CORE
For information on Fall & Injury Prevention, visit: [email or web address] OR  [email or web address] OR  https://www.cdc.gov/steadi/patient.html

## 2022-11-18 NOTE — PROGRESS NOTE ADULT - PROBLEM SELECTOR PROBLEM 2
Hypotension
Hypotension
Pancreatic cancer
Pancreatic cancer
Hypotension
Pancreatic cancer
Pancreatic cancer
Cancer related pain
Pancreatic cancer
Hypotension
Hypotension
Pancreatic cancer
Hypotension
Pancreatic cancer
Pancreatic cancer

## 2022-11-18 NOTE — PROGRESS NOTE ADULT - ASSESSMENT
81m with h/p pancreatic cancer s/p neoadjuvant gem/abraxane x 5 cycles completed in 9/21, s/p whipple operation 10/26/21. Pathology revealed pancreatic adenocarcinoma, 0/31LN, positive pancreatic resection margin requiring adjuvant RT (SBRT 4000 cGy in 5 fxs) completed in 1/19/22. Post op course c/b non healing abdominal wound, presenting for wound bleeding.     CT a/p 10/26:   New portal venous thrombosis involving the main, right, and left portal   veins. The portal splenic confluence and central splenic vein are not   well visualized and may be thrombosed.  Status post Whipple procedure. Question ill-defined soft tissue encasing   and obliterating the proximal main portal vein and portal splenic   confluence, and encasing the hepatic artery, raising the possibility of   locally recurrent disease.  New wedge-shaped geographic regions of low attenuation throughout the   liver, likely perfusional abnormalities related to new portal venous   thrombosis and possibly developing infarcts, with neoplasm thought to be   less likely.  New small volume ascites. New moderate bilateral pleural effusions.  Small focus of gas within the urinary bladder. Correlate for recent   instrumentation/catheterization.    CT results discussed with patient, his son at bedside and his daughter over the phone.     -CT A/p with concern for progression of disease given rising tumor markers, CT findings and rising signatera.   -Advanced GI input apprecaited, S/p EUS 11/10 w/ biopsies   -Path from [facility] of pancreatic mass consistent  with  Adenocarcinoma, moderately differentiated. Suggestive of recurred disease. Would rec CT chest with IV contrast for full restaging and to r/o any mets. CT chest with no evidence of mets, would check CT chest in 3 months for finding of indeterminant 4mm pulm nodule.  -Results were discussed with Mr Gatica. Given recurrence of cancer, future treatment will only be palliative in nature, with goal of shrinking or delaying growth of tumor(s).   No plans for systemic therapy while admitted in hospital or  at rehab. Patient to followup at outpatient cancer clinic for further treatment plan. Per patient request will also discuss results and plan with his dtr Una [phone]. Results of the CT chest were again discussed with patient dtr today.  -Hospital course has been complicated by post procedure sepsis,  diarrhea and anemia. Now medically stable for discharge to [facility]. Tentative discharge today.  -Appreciate Palliative care consult for symptom management  -Patient with new PVT, as per patient,  has been off  AC (for h/o CAD) for more than 1 month given abdominal wound complications. Currently on Lovenox.  -Patient to followup with Dr. Aden Juan ([facility]) upon discharge  -C/w Supportive care, pain control, Nutrition, PT, DVT ppx  -Oncology will continue to follow with you      Case discussed with Dr. Avel GARCÍA  Oncology Physician Assistant  [place] TOBAR/VALERY [facility]  Pager [phone] also available on Teams    If before 8am/after 5pm or on weekends please page On-call Oncology Fellow

## 2022-11-18 NOTE — PROGRESS NOTE ADULT - PROBLEM SELECTOR PLAN 2
C/w creon  - CT abd shows-New portal venous thrombosis involving the main, right, and left portal veins. The portal splenic confluence and central splenic vein are not well visualized and may be thrombosed.  Status post Whipple procedure. Question ill-defined soft tissue encasing and obliterating the proximal main portal vein and portal splenic confluence, and encasing the hepatic artery, raising the possibility of   locally recurrent disease. New wedge-shaped geographic regions of low attenuation throughout the   liver, likely perfusional abnormalities related to new portal venous thrombosis and possibly developing infarcts, with neoplasm thought to be less likely. New small volume ascites. New moderate bilateral pleural effusions  - Seen by Oncology and d/w them, apprec recs  - Recs are for IR biopsy of new area in question, seen by IR-No lesion present amenable to percutaneous biopsy  - F/up Ca 19-9  - Needs Full dose AC for portal vein thrombosis as able from surgical perspective. Started on xarelto yesterday. Monitor Hgb.
SBP 's throughout hospitalization, asymptomatic. Has been lower 11/9 onwards 2/2 NPO status and diarrhea, improves with IVF. Patient asymptomatic throughout admission, lactate 2.1, no other s/s of end organ dysfunction   - Febrile and hypotensive overnight 11/10 post-EUS- likely 2/2 gut translocation post procedurally   -  BCx NGTD. CXR w/o consolidation   - s/p empiric zosyn. will monitor off abx for now  - Encourage PO  - holding all BP meds metoprolol, entresto
CT abd shows-New portal venous thrombosis involving the main, right, and left portal veins. The portal splenic confluence and central splenic vein are not well visualized and may be thrombosed.  Status post Whipple procedure. Question ill-defined soft tissue encasing and obliterating the proximal main portal vein and portal splenic confluence, and encasing the hepatic artery, raising the possibility of   locally recurrent disease. New wedge-shaped geographic regions of low attenuation throughout the   liver, likely perfusional abnormalities related to new portal venous thrombosis and possibly developing infarcts, with neoplasm thought to be less likely. New small volume ascites. New moderate bilateral pleural effusions  - Needs Full dose AC for portal vein thrombosis as able from surgical perspective. Started on xarelto.  Needs to be on hold for GI procedure.   - Oncology following and is recommending biopsy to determine if there is a specific marker that would determine the utility for immunotherapy as a possible treatment.   - GI consulted today and will plan for endoscopy.   - Cardiac optimization complete - no further cardiac work up required.   - Medical clearance, Mets 4 - RCRI 1 - class II risk - 6% 30 day risk of death, MI or cardiac arrest. Low risk for a low risk procedure.   - Hold xarelto for 48 hours prior to the procedure and recommend starting a hep gtt (full dose anticoagulation)  - Needs to be off of AC for 48 hours - hold today.
C/w creon  - CT abd shows-New portal venous thrombosis involving the main, right, and left portal veins. The portal splenic confluence and central splenic vein are not well visualized and may be thrombosed.  Status post Whipple procedure. Question ill-defined soft tissue encasing and obliterating the proximal main portal vein and portal splenic confluence, and encasing the hepatic artery, raising the possibility of   locally recurrent disease. New wedge-shaped geographic regions of low attenuation throughout the   liver, likely perfusional abnormalities related to new portal venous thrombosis and possibly developing infarcts, with neoplasm thought to be less likely. New small volume ascites. New moderate bilateral pleural effusions  - Seen by Oncology and d/w them, apprec recs  - Recs are for IR biopsy of new area in question, seen by IR-No lesion present amenable to percutaneous biopsy  - F/up Ca 19-9  - Needs Full dose AC for portal vein thrombosis as able from surgical perspective. Started on xarelto. Monitor Hgb.
CT abd shows-New portal venous thrombosis involving the main, right, and left portal veins. The portal splenic confluence and central splenic vein are not well visualized and may be thrombosed.  Status post Whipple procedure. Question ill-defined soft tissue encasing and obliterating the proximal main portal vein and portal splenic confluence, and encasing the hepatic artery, raising the possibility of   locally recurrent disease. New wedge-shaped geographic regions of low attenuation throughout the   liver, likely perfusional abnormalities related to new portal venous thrombosis and possibly developing infarcts, with neoplasm thought to be less likely. New small volume ascites. New moderate bilateral pleural effusions  - Needs Full dose AC for portal vein thrombosis as able from surgical perspective. Started on xarelto.  Needs to be on hold for GI procedure. INR elevated to 3.43. Received vitamin K today. Hold off on hep gtt until INR decreases. Recheck INR tomorrow.   - Oncology following and is recommending biopsy to determine if there is a specific marker that would determine the utility for immunotherapy as a possible treatment.   - GI consulted today and will plan for endoscopy.   - Cardiac optimization complete - no further cardiac work up required.   - Medical clearance, Mets 4 - RCRI 1 - class II risk - 6% 30 day risk of death, MI or cardiac arrest. Low risk for a low risk procedure.   - Xarelto on hold  - Trend INR s/p vit K.
C/w creon  - CT abd shows-New portal venous thrombosis involving the main, right, and left portal veins. The portal splenic confluence and central splenic vein are not well visualized and may be thrombosed.  Status post Whipple procedure. Question ill-defined soft tissue encasing and obliterating the proximal main portal vein and portal splenic confluence, and encasing the hepatic artery, raising the possibility of   locally recurrent disease. New wedge-shaped geographic regions of low attenuation throughout the   liver, likely perfusional abnormalities related to new portal venous thrombosis and possibly developing infarcts, with neoplasm thought to be less likely. New small volume ascites. New moderate bilateral pleural effusions  - Seen by Oncology and d/w them, apprec recs  - Recs are for IR biopsy of new area in question  - F/up Ca 19-9  - Full dose AC for portal vein thrombosis as able from surgical perspective
SBP 's throughout hospitalization, asymptomatic. Has been lower 11/9 onwards 2/2 NPO status and diarrhea, improves with IVF. Patient asymptomatic throughout admission, lactate 2.1, no other s/s of end organ dysfunction   - Febrile and hypotensive overnight 11/10 post-EUS- likely 2/2 gut translocation post procedurally   -  BCx NGTD. CXR w/o consolidation   - s/p empiric zosyn. will monitor off abx for now  - Encourage PO  - holding all BP meds metoprolol, entresto; re-start as tolerated
Denies pain at this time  - Can continue with ATC Tylenol and PRN Oxycodone 5mg Q4hrs
SBP 's throughout hospitalization, asymptomatic. Has been lower 11/9 onwards 2/2 NPO status and diarrhea, improves with IVF. Patient asymptomatic throughout admission, lactate 2.1, no other s/s of end organ dysfunction   - Febrile and hypotensive overnight 11/10 post-EUS- likely 2/2 gut translocation post procedurally   -  BCx NGTD. CXR w/o consolidation   - s/p empiric zosyn. will monitor off abx for now  - Encourage PO  - holding all BP meds metoprolol, entresto; re-start as tolerated
-s/p whipple, echo and RT 10/21  -CT a/p this admission c/w PVT and liver lesion concern for cancer recurrence   -oncology following- rec bx liver lesion   - EUS aborted 11/7 for retained food in stomach  - NGT attempted 4 separate times without any success   [ ] Awaiting call from GI regarding EUS plan   -GI following  - CLD
-s/p whipple, echo and RT 10/21  -CT a/p this admission c/w PVT and liver lesion concern for cancer recurrence   -oncology following- rec bx liver lesion   -GI consulted- tentatively scheduled for EUS bx Mon pending INR<2   - Needs Full dose AC for portal vein thrombosis as able from surgical perspective. Started on xarelto.  currently on hold for GI procedure. s/p vit K 11/4 for INR 3.5. cont to trend INR  - Cardiac optimization complete - no further cardiac work up required.
C/w creon  - CT abd shows-New portal venous thrombosis involving the main, right, and left portal veins. The portal splenic confluence and central splenic vein are not well visualized and may be thrombosed.  Status post Whipple procedure. Question ill-defined soft tissue encasing and obliterating the proximal main portal vein and portal splenic confluence, and encasing the hepatic artery, raising the possibility of   locally recurrent disease. New wedge-shaped geographic regions of low attenuation throughout the   liver, likely perfusional abnormalities related to new portal venous thrombosis and possibly developing infarcts, with neoplasm thought to be less likely. New small volume ascites. New moderate bilateral pleural effusions  - Seen by Oncology and d/w them, apprec recs  - Recs are for IR biopsy of new area in question, seen by IR-No lesion present amenable to percutaneous biopsy  - F/up Ca 19-9  - Needs Full dose AC for portal vein thrombosis as able from surgical perspective
C/w creon. Oncology consulted here, follow up CT a/p, , outpatient Oncology follow up
SBP 's throughout hospitalization, asymptomatic. Has been lower 11/9 onwards 2/2 NPO status and diarrhea, improves with IVF. Patient asymptomatic throughout admission, lactate 2.1, no other s/s of end organ dysfunction   - Febrile and hypotensive overnight 11/10 post-EUS- likely 2/2 gut translocation post procedurally   - Cont Zosyn pending Bcx NEG 48H   [ ] BCx NGTD  - CXR w/o consolidation   - Encourage PO  - Consider discontinuing abx tomorrow if cx remain negative for 48h
CT abd shows-New portal venous thrombosis involving the main, right, and left portal veins. The portal splenic confluence and central splenic vein are not well visualized and may be thrombosed.  Status post Whipple procedure. Question ill-defined soft tissue encasing and obliterating the proximal main portal vein and portal splenic confluence, and encasing the hepatic artery, raising the possibility of   locally recurrent disease. New wedge-shaped geographic regions of low attenuation throughout the   liver, likely perfusional abnormalities related to new portal venous thrombosis and possibly developing infarcts, with neoplasm thought to be less likely. New small volume ascites. New moderate bilateral pleural effusions  - Needs Full dose AC for portal vein thrombosis as able from surgical perspective. Started on xarelto. Monitor Hgb.  - Oncology following and is recommending biopsy to determine if there is a specific marker that would determine the utility for immunotherapy as a possible treatment.   - GI consulted today and will plan for endoscopy.   - Cardiac optimization pending   - Medical clearance pending   - Hold xarelto for 48 hours prior to the procedure and recommend starting a hep gtt (full dose anticoagulation)  - Needs to be off of AC for 48 hours - hold today.
SBP 's throughout hospitalization, asymptomatic. Has been lower 11/9 onwards 2/2 NPO status and diarrhea, improves with IVF. Patient asymptomatic throughout admission, lactate 2.1, no other s/s of end organ dysfunction   - Febrile and hypotensive overnight 11/10 post-EUS- likely 2/2 gut translocation post procedurally   Will discontinue Zosyn given  Bcx NEG 48H (11/10)   -  BCx NGTD  - CXR w/o consolidation   - Encourage PO  will discontinuing abx today given cx remain negative for 48h
-s/p whipple, echo and RT 10/21  -CT a/p this admission c/w PVT and liver lesion concern for cancer recurrence   -oncology following- rec bx liver lesion   - EUS aborted 11/7 for retained food in stomach  - NGT attempted 4 separate times without any success   [ ] EUS today  -GI following
SBP 's throughout hospitalization, asymptomatic. Has been lower 11/9 onwards 2/2 NPO status and diarrhea, improves with IVF. Patient asymptomatic throughout admission, lactate 2.1, no other s/s of end organ dysfunction   - Febrile and hypotensive overnight 11/10 post-EUS- likely 2/2 gut translocation post procedurally   -  BCx NGTD. CXR w/o consolidation   - s/p empiric zosyn. will monitor off abx for now  - Encourage PO  - holding all BP meds metoprolol, entresto
-s/p whipple, echo and RT 10/21  -CT a/p this admission c/w PVT and liver lesion concern for cancer recurrence   -oncology following- rec bx liver lesion   - EUS aborted 11/7 for retained food in stomach  - Plan for NGT placement today   [ ] EUS planned for tomorrow   -GI following
C/w creon  - CT abd shows-New portal venous thrombosis involving the main, right, and left portal veins. The portal splenic confluence and central splenic vein are not well visualized and may be thrombosed.  Status post Whipple procedure. Question ill-defined soft tissue encasing and obliterating the proximal main portal vein and portal splenic confluence, and encasing the hepatic artery, raising the possibility of   locally recurrent disease. New wedge-shaped geographic regions of low attenuation throughout the   liver, likely perfusional abnormalities related to new portal venous thrombosis and possibly developing infarcts, with neoplasm thought to be less likely. New small volume ascites. New moderate bilateral pleural effusions  - Seen by Oncology and d/w them, apprec recs  - Recs are for IR biopsy of new area in question, seen by IR-No lesion present amenable to percutaneous biopsy  - F/up Ca 19-9  - Needs Full dose AC for portal vein thrombosis as able from surgical perspective
-s/p whipple, echo and RT 10/21  -CT a/p this admission c/w PVT and liver lesion concern for cancer recurrence   -oncology following- rec bx liver lesion   -GI consulted- tentatively scheduled for EUS bx Mon pending INR<2   - Needs Full dose AC for portal vein thrombosis as able from surgical perspective. Started on xarelto.  currently on hold for GI procedure. s/p vit K 11/4 for INR 3.5. cont to trend INR  - Cardiac optimization complete - no further cardiac work up required.
-s/p whipple, echo and RT 10/21  -CT a/p this admission c/w PVT and liver lesion concern for cancer recurrence   -oncology following- rec bx liver lesion   [ ] EUS planned for today with GI   -GI following   - Resume Heparin post-procedure when cleared by GI
C/w creon  - CT abd shows-New portal venous thrombosis involving the main, right, and left portal veins. The portal splenic confluence and central splenic vein are not well visualized and may be thrombosed.  Status post Whipple procedure. Question ill-defined soft tissue encasing and obliterating the proximal main portal vein and portal splenic confluence, and encasing the hepatic artery, raising the possibility of   locally recurrent disease. New wedge-shaped geographic regions of low attenuation throughout the   liver, likely perfusional abnormalities related to new portal venous thrombosis and possibly developing infarcts, with neoplasm thought to be less likely. New small volume ascites. New moderate bilateral pleural effusions  - Needs Full dose AC for portal vein thrombosis as able from surgical perspective. Started on xarelto. Monitor Hgb.  - Oncology following and is recommending biopsy to determine if there is a specific marker that would determine the utility for immunotherapy as a possible treatment.   - GI consulted today and will plan for endoscopy.   - Cardiac optimization pending   - Medical clearance pending   - Needs to be off of AC for 48 hours - hold today.
SBP 's throughout hospitalization, asymptomatic. Has been lower 11/9 onwards 2/2 NPO status and diarrhea, improves with IVF. Patient asymptomatic throughout admission, lactate 2.1, no other s/s of end organ dysfunction   - Febrile and hypotensive overnight 11/10 post-EUS- likely 2/2 gut translocation post procedurally   - Cont Zosyn pending Bcx   [ ] BCx, U/A   - CXR w/o consolidation   - Additional 250cc IVF today   - Encourage PO
SBP 's throughout hospitalization, asymptomatic. Has been lower 11/9 onwards 2/2 NPO status and diarrhea, improves with IVF. Patient asymptomatic throughout admission, lactate 2.1, no other s/s of end organ dysfunction   - Febrile and hypotensive overnight 11/10 post-EUS- likely 2/2 gut translocation post procedurally   -  BCx NGTD - will d/c antibiotics for now   - CXR w/o consolidation   - Encourage PO

## 2022-11-22 NOTE — HISTORY OF PRESENT ILLNESS
[de-identified] : Patient is admitted to [facility] overnight due to wound bleeding.\par Spoke to daughter Una, and will try to transfer from [facility] to [facility] or [facility].\par \par Dale Christopher, PGY-6, MD\par Case d/w Dr. Drake MD PhD

## 2022-11-22 NOTE — PATIENT PROFILE ADULT - FALL HARM RISK - HARM RISK INTERVENTIONS
Assistance with ambulation/Assistance OOB with selected safe patient handling equipment/Communicate Risk of Fall with Harm to all staff/Discuss with provider need for PT consult/Monitor gait and stability/Provide patient with walking aids - walker, cane, crutches/Reinforce activity limits and safety measures with patient and family/Tailored Fall Risk Interventions/Toileting schedule using arm’s reach rule for commode and bathroom/Use of alarms - bed, chair and/or voice tab/Visual Cue: Yellow wristband and red socks/Bed in lowest position, wheels locked, appropriate side rails in place/Call bell, personal items and telephone in reach/Instruct patient to call for assistance before getting out of bed or chair/Non-slip footwear when patient is out of bed/[place] to call system/Physically safe environment - no spills, clutter or unnecessary equipment/Purposeful Proactive Rounding/Room/bathroom lighting operational, light cord in reach

## 2022-11-23 ENCOUNTER — OFFICE (OUTPATIENT)
Dept: URBAN - METROPOLITAN AREA CLINIC 84 | Facility: CLINIC | Age: 82
Setting detail: OPHTHALMOLOGY
End: 2022-11-23
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
## 2022-11-23 PROCEDURE — NO SHOW FE NO SHOW FEE: Performed by: OPHTHALMOLOGY

## 2022-11-23 NOTE — H&P ADULT - PROBLEM SELECTOR PLAN 2
-Baseline hgb around 8-9, downtrending at last admission. Likely due to bleeding from abdominal incision   -Received 1U prbc at outside hospital, will get repeat CBC and transfuse if hgb <7

## 2022-11-23 NOTE — H&P ADULT - NSICDXPASTMEDICALHX_GEN_ALL_CORE_FT
PAST MEDICAL HISTORY:  Acute osteomyelitis     Cardiomyopathy     GERD (gastroesophageal reflux disease)     HLD (hyperlipidemia)     [place] (hard of hearing) B/L hearing aids    HTN (hypertension)     MSSA bacteremia 9/2021    Other complications of procedures, not elsewhere classified, subsequent encounter     Pancreas cancer chemo, s/p Whipple    Pulmonary embolism

## 2022-11-23 NOTE — DISCHARGE NOTE PROVIDER - NSDCCAREPROVSEEN_GEN_ALL_CORE_FT
Una Alvarez  [facility] Medicine ACP [facility] Medicine [facility]  Danyelle Crawford

## 2022-11-23 NOTE — DISCHARGE NOTE PROVIDER - NSDCCPCAREPLAN_GEN_ALL_CORE_FT
PRINCIPAL DISCHARGE DIAGNOSIS  Diagnosis: Open abdominal wall wound  Assessment and Plan of Treatment: You were evaluated by plastic surgery. You were recommended for topical wound care as follows:   - every other day aquacel AG, ABD and abdominal binder placement.   Please follow up with plastic surgeon Dr. Gallegos next week, call to schedule appointment.        SECONDARY DISCHARGE DIAGNOSES  Diagnosis: Portal vein thrombosis  Assessment and Plan of Treatment: Your Xarelto was stopped given continued bleeding at abdominal wound. Do not resume Xarelto upon discharge.    Diagnosis: Chronic systolic congestive heart failure  Assessment and Plan of Treatment: Continue recommended medication regimen. Monitor for signs/symptoms of fluid overload and electrolyte abnormalities, such as, shortness of breath, cough, swelling, chest discomfort, changes in heart rate, dizziness, fainting, or changes in mental status. Follow-up with your PCP/cardiologist outpatient after you've been discharged from the hospital.    Diagnosis: Hypothyroid  Assessment and Plan of Treatment: Continue with synthroid. Routine monitoring of thyroid panel as out patient.    Diagnosis: Pancreatic cancer  Assessment and Plan of Treatment: Out patient follow up at [facility] after discharge from rehab.     PRINCIPAL DISCHARGE DIAGNOSIS  Diagnosis: Open abdominal wall wound  Assessment and Plan of Treatment: You came to the hospital due to bleeding from your abdominal incision site. You were evaluated by plastic surgery. You were recommended for topical wound care as follows: aquacel AG, ABD pad, and abdominal binder placement every other day.  Please follow up with plastic surgeon Dr. Gallegos next week, call to schedule an appointment.      SECONDARY DISCHARGE DIAGNOSES  Diagnosis: Pancreatic cancer  Assessment and Plan of Treatment: Follow up with Dr. Juan at [facility] after discharge from rehab.    Diagnosis: Portal vein thrombosis  Assessment and Plan of Treatment: Your Xarelto was stopped given continued bleeding at abdominal wound. Do not resume Xarelto upon discharge.    Diagnosis: Chronic systolic congestive heart failure  Assessment and Plan of Treatment: Continue recommended medication regimen. Monitor for signs/symptoms of fluid overload and electrolyte abnormalities, such as, shortness of breath, cough, swelling, chest discomfort, changes in heart rate, dizziness, fainting, or changes in mental status. Follow-up with your PCP/cardiologist outpatient after you've been discharged from the hospital.    Diagnosis: Hypothyroid  Assessment and Plan of Treatment: Continue with synthroid. Routine monitoring of thyroid panel as outpatient.     PRINCIPAL DISCHARGE DIAGNOSIS  Diagnosis: Open abdominal wall wound  Assessment and Plan of Treatment: You came to the hospital due to bleeding from your abdominal incision site. You were evaluated by plastic surgery. You were recommended for topical wound care as follows: aquacel AG, ABD pad, and abdominal binder placement every other day.  Please follow up with plastic surgeon Dr. Gallegos next week, call to schedule an appointment.      SECONDARY DISCHARGE DIAGNOSES  Diagnosis: Pancreatic cancer  Assessment and Plan of Treatment: Follow up with Dr. Juan after discharge from rehab.    Diagnosis: Portal vein thrombosis  Assessment and Plan of Treatment: Your Xarelto was stopped given continued bleeding at abdominal wound. Do not resume Xarelto upon discharge.    Diagnosis: Chronic systolic congestive heart failure  Assessment and Plan of Treatment: Continue recommended medication regimen - your Metoprolol was restarted at a lower dose, continue with your Entresto as prescribed. Monitor for signs/symptoms of fluid overload and electrolyte abnormalities, such as, shortness of breath, cough, swelling, chest discomfort, changes in heart rate, dizziness, fainting, or changes in mental status. Follow-up with your PCP/cardiologist outpatient after you've been discharged from the hospital.    Diagnosis: Hypothyroid  Assessment and Plan of Treatment: Continue with synthroid. Routine monitoring of thyroid panel as outpatient.

## 2022-11-23 NOTE — CONSULT NOTE ADULT - SUBJECTIVE AND OBJECTIVE BOX
HPI as per admitting team:   80 yo M w/ HTN, HLD, HFrEF (EF 30-35%), VT, s/p ACID, AS s/p TAVR, CAD s/p stents, GERD, hypothyroid, anxiety, pancreatic ca s/p whipple 10/26/21, chemo and xrt with non-healing abdominal wound treated w/ HBOT and STSG 7/8/22, s/p repeat STSG wound vac placement on 9/29 transferred from OSH for bleeding from abdominal incision site. Pt was recently admitted at [facility] on 10/24-11/18 for similar complains and had wound vac placed by plastic surgery (now removed). At OSH pt was found to have a hgb of 7 (baseline 8-9) and given 1U of prbc before being transferred. Pt states that he is having 6/10 pain at the incisional site. Denies melena, hematochezia or hematuria. Denies chest pain, SOB, palpitation N/V, dizziness or headaches.   Pt afebrile, HR 91, /61 and saturating well on RA.  (23 Nov 2022 01:59)        REVIEW OF SYSTEMS:    CONSTITUTIONAL: No weakness, fevers or chills  EYES/ENT: No visual changes;  No vertigo or throat pain   NECK: No pain or stiffness  RESPIRATORY: No cough, wheezing, hemoptysis; No shortness of breath  CARDIOVASCULAR: No chest pain or palpitations  GASTROINTESTINAL: No abdominal or epigastric pain. No nausea, vomiting, or hematemesis; No diarrhea or constipation. No melena or hematochezia.  GENITOURINARY: No dysuria, frequency or hematuria  NEUROLOGICAL: No numbness or weakness  SKIN: No itching, burning, rashes, or lesions   All other review of systems is negative unless indicated above.    PAST MEDICAL & SURGICAL HISTORY:  HTN (hypertension)      HLD (hyperlipidemia)      GERD (gastroesophageal reflux disease)      Cardiomyopathy      MSSA bacteremia  9/2021      Acute osteomyelitis      Pulmonary embolism      Pancreas cancer  chemo, s/p Whipple      [place] (hard of hearing)  B/L hearing aids      Other complications of procedures, not elsewhere classified, subsequent encounter      History of total hip replacement  left X 1, 2 revisions      History of laminectomy  X3      ICD (implantable cardiac defibrillator) in place  implanted 2005, replaced 10/4/2021 [employer] Subcutaneous ICD      Benign testicular tumor  radiation      Status post amputation of toe of right foot  8/2021      Status post fracture of femur  2017 left with hardware      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      H/O Whipple procedure  2/2021, 10/2021          FAMILY HISTORY:  Family history of stroke (Mother)        SOCIAL HISTORY:     Allergies    No Known Allergies    Intolerances        MEDICATIONS  (STANDING):  ALPRAZolam 0.25 milliGRAM(s) Oral at bedtime  aMIOdarone    Tablet 200 milliGRAM(s) Oral daily  chlorhexidine 2% Cloths 1 Application(s) Topical daily  DULoxetine 60 milliGRAM(s) Oral at bedtime  gabapentin 100 milliGRAM(s) Oral three times a day  levothyroxine 25 MICROGram(s) Oral daily  pancrelipase  (CREON 24,000 Lipase Units) 3 Capsule(s) Oral three times a day with meals  pantoprazole    Tablet 40 milliGRAM(s) Oral before breakfast  tamsulosin 0.4 milliGRAM(s) Oral at bedtime    MEDICATIONS  (PRN):  diphenhydrAMINE 25 milliGRAM(s) Oral every 6 hours PRN Rash and/or Itching  melatonin 3 milliGRAM(s) Oral at bedtime PRN Insomnia  oxyCODONE    IR 5 milliGRAM(s) Oral every 6 hours PRN Severe Pain (7 - 10)  simethicone 80 milliGRAM(s) Chew three times a day PRN Gas      OBJECTIVE   Height (cm): 185.4 (11-22 @ 23:21)  Weight (kg): 87.5 (11-22 @ 23:21)  BMI (kg/m2): 25.5 (11-22 @ 23:21)  BSA (m2): 2.12 (11-22 @ 23:21)    T(F): 98 (11-23-22 @ 05:55), Max: 98 (11-23-22 @ 05:55)  HR: 92 (11-23-22 @ 05:55)  BP: 142/65 (11-23-22 @ 05:55)  RR: 18 (11-23-22 @ 05:55)  SpO2: 100% (11-23-22 @ 05:55)  Wt(kg): --    PHYSICAL EXAM   GENERAL: NAD, well-developed  HEAD:  Atraumatic, Normocephalic  EYES: EOMI, PERRLA, conjunctiva and sclera clear  NECK: Supple, No JVD  CHEST/LUNG: Clear to auscultation bilaterally; No wheeze  HEART: Regular rate and rhythm; No murmurs, rubs, or gallops  ABDOMEN: Soft, Nontender, Nondistended; Bowel sounds present  EXTREMITIES:  2+ Peripheral Pulses, No clubbing, cyanosis, or edema  NEUROLOGY: non-focal  SKIN: No rashes or lesions                          7.8    3.49  )-----------( 149      ( 23 Nov 2022 03:12 )             23.8       11-23    135  |  102  |  11  ----------------------------<  111<H>  4.3   |  23  |  0.57    Ca    8.3<L>      23 Nov 2022 03:12  Phos  2.6     11-23  Mg     1.80     11-23    TPro  6.2  /  Alb  2.7<L>  /  TBili  1.2  /  DBili  x   /  AST  35  /  ALT  20  /  AlkPhos  120  11-23      Magnesium, Serum: 1.80 mg/dL (11-23 @ 03:12)  Phosphorus Level, Serum: 2.6 mg/dL (11-23 @ 03:12)       HPI as per admitting team:   82 yo M w/ HTN, HLD, HFrEF (EF 30-35%), VT, s/p ACID, AS s/p TAVR, CAD s/p stents, GERD, hypothyroid, anxiety, pancreatic ca s/p whipple 10/26/21, chemo and xrt with non-healing abdominal wound treated w/ HBOT and STSG 7/8/22, s/p repeat STSG wound vac placement on 9/29 transferred from OSH for bleeding from abdominal incision site. Pt was recently admitted at [facility] on 10/24-11/18 for similar complains and had wound vac placed by plastic surgery (now removed). At OSH pt was found to have a hgb of 7 (baseline 8-9) and given 1U of prbc before being transferred. Pt states that he is having 6/10 pain at the incisional site. Denies melena, hematochezia or hematuria. Denies chest pain, SOB, palpitation N/V, dizziness or headaches.   Pt afebrile, HR 91, /61 and saturating well on RA.  (23 Nov 2022 01:59)        REVIEW OF SYSTEMS:  CONSTITUTIONAL: +generalized weakness, no fevers or chills  EYES/ENT: No visual changes;  No vertigo or throat pain   NECK: No pain or stiffness  RESPIRATORY: No cough, wheezing, hemoptysis; No shortness of breath  CARDIOVASCULAR: No chest pain or palpitations  GASTROINTESTINAL: + intermittent abdominal pain at incision site  GENITOURINARY: No dysuria, frequency or hematuria      PAST MEDICAL & SURGICAL HISTORY:  HTN (hypertension)      HLD (hyperlipidemia)      GERD (gastroesophageal reflux disease)      Cardiomyopathy      MSSA bacteremia  9/2021      Acute osteomyelitis      Pulmonary embolism      Pancreas cancer  chemo, s/p Whipple      [place] (hard of hearing)  B/L hearing aids      Other complications of procedures, not elsewhere classified, subsequent encounter      History of total hip replacement  left X 1, 2 revisions      History of laminectomy  X3      ICD (implantable cardiac defibrillator) in place  implanted 2005, replaced 10/4/2021 [employer] Subcutaneous ICD      Benign testicular tumor  radiation      Status post amputation of toe of right foot  8/2021      Status post fracture of femur  2017 left with hardware      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      H/O Whipple procedure  2/2021, 10/2021          FAMILY HISTORY:  Family history of stroke (Mother)        Allergies    No Known Allergies    Intolerances        MEDICATIONS  (STANDING):  ALPRAZolam 0.25 milliGRAM(s) Oral at bedtime  aMIOdarone    Tablet 200 milliGRAM(s) Oral daily  chlorhexidine 2% Cloths 1 Application(s) Topical daily  DULoxetine 60 milliGRAM(s) Oral at bedtime  gabapentin 100 milliGRAM(s) Oral three times a day  levothyroxine 25 MICROGram(s) Oral daily  pancrelipase  (CREON 24,000 Lipase Units) 3 Capsule(s) Oral three times a day with meals  pantoprazole    Tablet 40 milliGRAM(s) Oral before breakfast  tamsulosin 0.4 milliGRAM(s) Oral at bedtime    MEDICATIONS  (PRN):  diphenhydrAMINE 25 milliGRAM(s) Oral every 6 hours PRN Rash and/or Itching  melatonin 3 milliGRAM(s) Oral at bedtime PRN Insomnia  oxyCODONE    IR 5 milliGRAM(s) Oral every 6 hours PRN Severe Pain (7 - 10)  simethicone 80 milliGRAM(s) Chew three times a day PRN Gas      OBJECTIVE   Height (cm): 185.4 (11-22 @ 23:21)  Weight (kg): 87.5 (11-22 @ 23:21)  BMI (kg/m2): 25.5 (11-22 @ 23:21)  BSA (m2): 2.12 (11-22 @ 23:21)    T(F): 98 (11-23-22 @ 05:55), Max: 98 (11-23-22 @ 05:55)  HR: 92 (11-23-22 @ 05:55)  BP: 142/65 (11-23-22 @ 05:55)  RR: 18 (11-23-22 @ 05:55)  SpO2: 100% (11-23-22 @ 05:55)  Wt(kg): --    PHYSICAL EXAM   GENERAL: NAD, well-developed  HEAD:  Atraumatic, Normocephalic  EYES: EOMI, PERRLA, conjunctiva and sclera clear  NECK: Supple, No JVD  CHEST/LUNG: No increased work of breathing  HEART: Regular rate and rhythm  ABDOMEN: Abdomen bandaged; bandage dry  NEUROLOGY: non-focal        LABS             7.8    3.49  )-----------( 149      ( 23 Nov 2022 03:12 )             23.8       11-23    135  |  102  |  11  ----------------------------<  111<H>  4.3   |  23  |  0.57    Ca    8.3<L>      23 Nov 2022 03:12  Phos  2.6     11-23  Mg     1.80     11-23    TPro  6.2  /  Alb  2.7<L>  /  TBili  1.2  /  DBili  x   /  AST  35  /  ALT  20  /  AlkPhos  120  11-23      Magnesium, Serum: 1.80 mg/dL (11-23 @ 03:12)  Phosphorus Level, Serum: 2.6 mg/dL (11-23 @ 03:12)

## 2022-11-23 NOTE — DISCHARGE NOTE PROVIDER - ATTENDING DISCHARGE PHYSICAL EXAMINATION:
Patient seen and examined at the bedside. Stable and ready for discharge to rehab facility. H/H stable   Please see physical exam noted on 11/25 progress note.

## 2022-11-23 NOTE — PHYSICAL THERAPY INITIAL EVALUATION ADULT - PATIENT PROFILE REVIEW, REHAB EVAL
No Formal Activity Order in the Computer; Spoke with DAT Lopez prior to PT evaluation-->Pt OK for PT consult/OOB activity./yes

## 2022-11-23 NOTE — CONSULT NOTE ADULT - ASSESSMENT
This is a 82 yo M PMH HTN, HLD, HFrEF s/p ACID, AS s/p TAVR, CAD s/p stents, pancreatic ca s/p chemo and radiation therapy s/p whipple on 10/26/21 with resulting non-healing abdominal wound treated with repeat STSG and vac p/w bleeding at abdominal incision site     Plan:  - no active bleeding, surgicel placed on wound bed at this time, can be removed during next dressing change.   - Recommend daily aquacel AG, ABD and abdominal binder placement.   - recommend follow up with Dr. Gallegos next week  - no PRS related concerns  - recommend restarting eliquis as per medicine  - rest of care of primary team  - dispo planning per primary team    Og Gallego PGY2  Plastic Surgery  [id number] [facility] pager  [phone] [facility] pager  Available on Teams        This is a 82 yo M PMH HTN, HLD, HFrEF s/p ACID, AS s/p TAVR, CAD s/p stents, pancreatic ca s/p chemo and radiation therapy s/p whipple on 10/26/21 with resulting non-healing abdominal wound treated with repeat STSG and vac p/w bleeding at abdominal incision site     Plan:  - no active bleeding, surgicel placed on wound bed at this time, can be removed during next dressing change.   - Recommend every other day aquacel AG, ABD and abdominal binder placement.   - recommend follow up with Dr. Gallegso next week  - no PRS related concerns  - recommend restarting eliquis as per medicine  - rest of care of primary team  - dispo planning per primary team    Og Gallego PGY2  Plastic Surgery  [phone] [facility] pager  [phone] [facility] pager  Available on Teams

## 2022-11-23 NOTE — DISCHARGE NOTE PROVIDER - PROVIDER TOKENS
PROVIDER:[TOKEN:[[id number]],FOLLOWUP:[1 week],ESTABLISHEDPATIENT:[T]] PROVIDER:[TOKEN:[[id number]],FOLLOWUP:[1 week],ESTABLISHEDPATIENT:[T]],PROVIDER:[TOKEN:[[id number]],ESTABLISHEDPATIENT:[T]]

## 2022-11-23 NOTE — PROGRESS NOTE ADULT - SUBJECTIVE AND OBJECTIVE BOX
Patient is a 81y old  Male who presents with a chief complaint of bleeding from abdominal incision (23 Nov 2022 12:21)      SUBJECTIVE / OVERNIGHT EVENTS:  patient seen with PA and RN,  sitting in carlo- wound  dressing removed from abd to show the issue  Patient had surgery for Whipple in October and L thigh wound graft placed to abd but NOT healing    MEDICATIONS  (STANDING):  ALPRAZolam 0.25 milliGRAM(s) Oral at bedtime  aMIOdarone    Tablet 200 milliGRAM(s) Oral daily  chlorhexidine 2% Cloths 1 Application(s) Topical daily  DULoxetine 60 milliGRAM(s) Oral at bedtime  gabapentin 100 milliGRAM(s) Oral three times a day  levothyroxine 25 MICROGram(s) Oral daily  pancrelipase  (CREON 24,000 Lipase Units) 3 Capsule(s) Oral three times a day with meals  pantoprazole    Tablet 40 milliGRAM(s) Oral before breakfast  tamsulosin 0.4 milliGRAM(s) Oral at bedtime    MEDICATIONS  (PRN):  diphenhydrAMINE 25 milliGRAM(s) Oral every 6 hours PRN Rash and/or Itching  melatonin 3 milliGRAM(s) Oral at bedtime PRN Insomnia  oxyCODONE    IR 5 milliGRAM(s) Oral every 6 hours PRN Severe Pain (7 - 10)  simethicone 80 milliGRAM(s) Chew three times a day PRN Gas    Vital Signs Last 24 Hrs  T(C): 36.2 (23 Nov 2022 12:27), Max: 36.7 (23 Nov 2022 05:55)  T(F): 97.1 (23 Nov 2022 12:27), Max: 98 (23 Nov 2022 05:55)  HR: 86 (23 Nov 2022 12:27) (86 - 92)  BP: 111/57 (23 Nov 2022 12:27) (111/57 - 142/65)  BP(mean): --  RR: 18 (23 Nov 2022 12:27) (18 - 18)  SpO2: 100% (23 Nov 2022 12:27) (100% - 100%)    Parameters below as of 23 Nov 2022 12:27  Patient On (Oxygen Delivery Method): room air          PHYSICAL EXAM:  GENERAL: NAD, well-developed  HEAD:  Atraumatic, Normocephalic  EYES: EOMI, PERRLA, conjunctiva and sclera clear  NECK: Supple, No JVD  CHEST/LUNG: Clear to auscultation bilaterally; No wheeze  HEART: Regular rate and rhythm; No murmurs, rubs, or gallops  ABDOMEN: Soft, Nontender, Nondistended; Bowel sounds present  Impressive rectangular area to mid abdomen about 8cm across and runs from above the umbilicus to about area of iliac wing in lengtherythema, blood oozing . packed dressing already soaked with blood and removed  EXTREMITIES:  2+ Peripheral Pulses, No clubbing, cyanosis, or edema  PSYCH: Alert  NEUROLOGY: non-focal  SKIN: No rashes or lesions    LABS:                        7.8    3.49  )-----------( 149      ( 23 Nov 2022 03:12 )             23.8     11-23    135  |  102  |  11  ----------------------------<  111<H>  4.3   |  23  |  0.57    Ca    8.3<L>      23 Nov 2022 03:12  Phos  2.6     11-23  Mg     1.80     11-23    TPro  6.2  /  Alb  2.7<L>  /  TBili  1.2  /  DBili  x   /  AST  35  /  ALT  20  /  AlkPhos  120  11-23          Care Discussed with Consultants/Other Providers:  onc- agrees with plan for Plastic eval  Cbc q12. keep hb>7

## 2022-11-23 NOTE — PHYSICAL THERAPY INITIAL EVALUATION ADULT - DIAGNOSIS, PT EVAL
Pt admitted for Abdominal Incision bleeding; pt presents with decreased strength and decreased balance.

## 2022-11-23 NOTE — CONSULT NOTE ADULT - ATTENDING COMMENTS
81m with h/p pancreatic cancer s/p neoadjuvant gem/abraxane x 5 cycles completed in 9/21, s/p whipple operation 10/26/21. Pathology revealed pancreatic adenocarcinoma, 0/31LN, positive pancreatic resection margin requiring adjuvant RT (SBRT 4000 cGy in 5 fxs) completed in 1/19/22. Post op course c/b non healing abdominal wound, presenting for wound bleeding.     He has had a complicated recent hospital course with a recent 3-week admission for bleeding abdominal wound as well as sepsis. He had just started rehabilitation when his wound bleeded again, so he was transferred to [facility], where he received a transfusion, and then was transferred to [facility]. Here, his bleeding has stopped, his hemoglobin has remained stable. Despite relapse of malignancy, he has had clinically significant bleeding resulting in hospitalization, so for now the risk of anticoagulation likely outweigh risk and can hold for now.    Palliative chemotherapy is currently not indicated as he is currently asymptomatic from his cancer, which is limited to local relapse, and he needs to regain performance status to be eligible. I re-enforced to the patient and his daugthers at bedside that physical therapy at this time and rehabilitation is crucial. If hgb remains stable, can be returned to [facility] for rehab.    David Juan MD PhD  Oncology Attending

## 2022-11-23 NOTE — PHYSICAL THERAPY INITIAL EVALUATION ADULT - GENERAL OBSERVATIONS, REHAB EVAL
Pt encountered in semisupine position, no distress, AxOx4, with +IV, right chest wall port, +abdominal binder, and family @bedside. Pt agreeable to participate in PT evaluation.
27-Mar-2018 01:58

## 2022-11-23 NOTE — H&P ADULT - HISTORY OF PRESENT ILLNESS
82 yo M w/ HTN, HLD, HFrEF (EF 30-35%), VT, s/p ACID, AS s/p TAVR, CAD s/p stents, GERD, hypothyroid, anxiety, pancreatic ca s/p whipple 10/26/21, chemo and xrt with non-healing abdominal wound treated w/ HBOT and STSG 7/8/22, s/p repeat STSG wound vac placement on 9/29 transferred from OSH for bleeding from abdominal incision site. Pt was recently admitted at [facility] on 10/24-11/18 for similar complains and had wound vac placed by plastic surgery (now removed). At OSH pt was found to have a hgb of 7 (baseline 8-9) and given 1U of prbc before being transferred. Pt states that he is having 6/10 pain at the incisional site. Denies melena, hematochezia or hematuria. Denies chest pain, SOB, palpitation N/V, dizziness or headaches.   Pt afebrile, HR 91, /61 and saturating well on RA.

## 2022-11-23 NOTE — H&P ADULT - ASSESSMENT
This is a 80 yo M PMH HTN, HLD, HFrEF s/p ACID, AS s/p TAVR, CAD s/p stents, pancreatic ca s/p chemo and radiation therapy s/p whipple on 10/26/21 with resulting non-healing abdominal wound treated with repeat STSG and vac p/w bleeding at abdominal incision site

## 2022-11-23 NOTE — H&P ADULT - ATTENDING COMMENTS
Pt with pancreatic ca s/p whipple with non-healing wound, CHF, AICD for secondary prevention, TAVR, CAD p/w continued non-healing abd wound with bleeding.  Wound vac in place  No signs of infection  Pt without complaints at this time   Labs reviewed, notable for decreased hemoglobin from a few days ago  Will consult plastic surgery  trend hemoglobin  Hold Xarelto for now

## 2022-11-23 NOTE — DISCHARGE NOTE PROVIDER - NSDCMRMEDTOKEN_GEN_ALL_CORE_FT
ALPRAZolam 0.25 mg oral tablet: 1 tab(s) orally once a day (at bedtime)  amiodarone 200 mg oral tablet: 1 tab(s) orally once a day AM  diphenhydrAMINE 25 mg oral capsule: 1 cap(s) orally every 6 hours, As needed, Rash and/or Itching  DULoxetine 60 mg oral delayed release capsule: 1 cap(s) orally once a day (at bedtime)  gabapentin 100 mg oral capsule: 1 cap(s) orally 3 times a day  levothyroxine 25 mcg (0.025 mg) oral tablet: 1 tab(s) orally once a day AM  melatonin 3 mg oral tablet: 1 tab(s) orally once a day (at bedtime), As needed, Insomnia  Narcan 4 mg/0.1 mL nasal spray: 1 spray(s) intranasally once, As Needed for opioid overdose (oversedation, respiratory depression, unreponsiveness)  ocular lubricant ophthalmic solution: 1 drop(s) to each affected eye every 2 hours, As needed, Dry Eyes  oxyCODONE 5 mg oral tablet: 1 tab(s) orally every 6 hours, As Needed for severe pain MDD:4 tablets    ISTOP Reference #: [id number]  pancrelipase 24,000 units-76,000 units-120,000 units oral delayed release capsule: 3 cap(s) orally 3 times a day (with meals)  pantoprazole 40 mg oral delayed release tablet: 1 tab(s) orally once a day AM  petrolatum topical ointment: 1 application topically once a day  simethicone 80 mg oral tablet, chewable: 1 tab(s) orally 3 times a day, As needed, Gas  tamsulosin 0.4 mg oral capsule: 1 cap(s) orally once a day (at bedtime)  Xarelto Starter Pack 15 mg-20 mg oral kit: 1 tab(s) orally 2 times a day     - one tab 15 mg BID x 21 days  - then, one tab of 20 mg daily afterwards     *Price check- TEAMS [facility] [place] [place] or [facility] [address]     ALPRAZolam 0.25 mg oral tablet: 1 tab(s) orally once a day (at bedtime)  amiodarone 200 mg oral tablet: 1 tab(s) orally once a day AM  diphenhydrAMINE 25 mg oral capsule: 1 cap(s) orally every 6 hours, As needed, Rash and/or Itching  DULoxetine 60 mg oral delayed release capsule: 1 cap(s) orally once a day (at bedtime)  gabapentin 100 mg oral capsule: 1 cap(s) orally 3 times a day  levothyroxine 25 mcg (0.025 mg) oral tablet: 1 tab(s) orally once a day AM  melatonin 3 mg oral tablet: 1 tab(s) orally once a day (at bedtime), As needed, Insomnia  Narcan 4 mg/0.1 mL nasal spray: 1 spray(s) intranasally once, As Needed for opioid overdose (oversedation, respiratory depression, unreponsiveness)  ocular lubricant ophthalmic solution: 1 drop(s) to each affected eye every 2 hours, As needed, Dry Eyes  oxyCODONE 5 mg oral tablet: 1 tab(s) orally every 6 hours, As Needed for severe pain MDD:4 tablets    ISTOP Reference #: [id number]  pancrelipase 24,000 units-76,000 units-120,000 units oral delayed release capsule: 3 cap(s) orally 3 times a day (with meals)  pantoprazole 40 mg oral delayed release tablet: 1 tab(s) orally once a day AM  petrolatum topical ointment: 1 application topically once a day  simethicone 80 mg oral tablet, chewable: 1 tab(s) orally 3 times a day, As needed, Gas  tamsulosin 0.4 mg oral capsule: 1 cap(s) orally once a day (at bedtime)   ALPRAZolam 0.25 mg oral tablet: 1 tab(s) orally once a day (at bedtime)  amiodarone 200 mg oral tablet: 1 tab(s) orally once a day AM  DULoxetine 60 mg oral delayed release capsule: 1 cap(s) orally once a day (at bedtime)  Entresto 49 mg-51 mg oral tablet: 1 tab(s) orally 2 times a day hold for systolic BP &lt;90  gabapentin 100 mg oral capsule: 1 cap(s) orally 3 times a day  levothyroxine 25 mcg (0.025 mg) oral tablet: 1 tab(s) orally once a day AM  melatonin 3 mg oral tablet: 1 tab(s) orally once a day (at bedtime), As needed, Insomnia  Metoprolol Succinate ER 25 mg oral tablet, extended release: 1 tab(s) orally once a day  mupirocin 2% topical ointment: 1 application topically 2 times a day  Narcan 4 mg/0.1 mL nasal spray: 1 spray(s) intranasally once, As Needed for opioid overdose (oversedation, respiratory depression, unreponsiveness)  ocular lubricant ophthalmic solution: 1 drop(s) to each affected eye every 2 hours, As needed, Dry Eyes  oxyCODONE 5 mg oral tablet: 1 tab(s) orally every 6 hours, As needed, Severe Pain (7 - 10)  pancrelipase 24,000 units-76,000 units-120,000 units oral delayed release capsule: 3 cap(s) orally 3 times a day (with meals)  pantoprazole 40 mg oral delayed release tablet: 1 tab(s) orally once a day AM  simethicone 80 mg oral tablet, chewable: 1 tab(s) orally 3 times a day, As needed, Gas  tamsulosin 0.4 mg oral capsule: 1 cap(s) orally once a day (at bedtime)

## 2022-11-23 NOTE — PHYSICAL THERAPY INITIAL EVALUATION ADULT - PERTINENT HX OF CURRENT PROBLEM, REHAB EVAL
This is a 82 yo M PMH HTN, HLD, HFrEF s/p ACID, AS s/p TAVR, CAD s/p stents, pancreatic ca s/p chemo and radiation therapy s/p whipple on 10/26/21 with resulting non-healing abdominal wound treated with repeat STSG and vac p/w bleeding at abdominal incision site

## 2022-11-23 NOTE — H&P ADULT - PROBLEM SELECTOR PLAN 3
s/p whipple, chemo and RT 10/21  - EUS 11/10 w/ biopsies -path +adenocarcinoma c/w recurrence of disease  - Discussion last week with medical oncology and family, pt offered palliative chemo outpt after rehab  -Will consult onc in the AM

## 2022-11-23 NOTE — PROGRESS NOTE ADULT - PROBLEM SELECTOR PLAN 2
s/p Caden s/p Whipple  Problem: Pancreatic cancer.   ·  Plan: s/p whipple, chemo and RT 10/21  - EUS 11/10 w/ biopsies -path +adenocarcinoma c/w recurrence of disease  - Discussion last week with medical oncology and family, pt offered palliative chemo outpt after rehab  -Will consult onc in the AM.  Patient followed by Dr Juan at [facility]

## 2022-11-23 NOTE — PROGRESS NOTE ADULT - PROBLEM SELECTOR PLAN 1
Will get Plastics to see Will get Plastics to see today  Open abdominal wall wound.   ·  Plan: -Pt p/w repeat bleeding  from abdominal incision site  -Had recent admission for similar complains with wound vac placed by plastic surgery   -Will consult plastic surgery for wound evaluation in the AM. Will get Plastics to see today  Open abdominal wall wound.   ·  Plan: -Pt p/w repeat bleeding  from abdominal incision site  -Had recent admission for similar complains with wound vac placed by plastic surgery   -Will consult plastic surgery for wound evaluation

## 2022-11-23 NOTE — H&P ADULT - NSHPREVIEWOFSYSTEMS_GEN_ALL_CORE
REVIEW OF SYSTEMS:    CONSTITUTIONAL: No weakness, fevers or chills  EYES:  No visual changes, no eye pain   ENT: No vertigo or throat pain   NECK: No pain or stiffness  RESPIRATORY: No cough, wheezing, hemoptysis; No shortness of breath  CARDIOVASCULAR: No chest pain or palpitations  GASTROINTESTINAL: + abdominal pain at incisional site. No nausea, vomiting, or hematemesis; No diarrhea or constipation. No melena or hematochezia.  GENITOURINARY: No dysuria, frequency or hematuria  NEUROLOGICAL: No numbness or weakness  SKIN: No itching, rashes. Abdominal incision site wrapped   Psych: no anxiety or depression

## 2022-11-23 NOTE — DISCHARGE NOTE PROVIDER - DETAILS OF MALNUTRITION DIAGNOSIS/DIAGNOSES
"    Preventive Care at the 15 - 18 Year Visit    Growth Percentiles & Measurements   Weight: 205 lbs 3.2 oz / 93.1 kg (actual weight) / 98 %ile based on CDC 2-20 Years weight-for-age data using vitals from 8/20/2018.   Length: 5' 11.5\" / 181.6 cm 84 %ile based on CDC 2-20 Years stature-for-age data using vitals from 8/20/2018.   BMI: Body mass index is 28.22 kg/(m^2). 96 %ile based on CDC 2-20 Years BMI-for-age data using vitals from 8/20/2018.   Blood Pressure: Blood pressure percentiles are 27.8 % systolic and 45.9 % diastolic based on the August 2017 AAP Clinical Practice Guideline.    Next Visit    Continue to see your health care provider every year for preventive care.    Nutrition    It s very important to eat breakfast. This will help you make it through the morning.    Sit down with your family for a meal on a regular basis.    Eat healthy meals and snacks, including fruits and vegetables. Avoid salty and sugary snack foods.    Be sure to eat foods that are high in calcium and iron.    Avoid or limit caffeine (often found in soda pop).    Sleeping    Your body needs about 9 hours of sleep each night.    Keep screens (TV, computer, and video) out of the bedroom / sleeping area.  They can lead to poor sleep habits and increased obesity.    Health    Limit TV, computer and video time.    Set a goal to be physically fit.  Do some form of exercise every day.  It can be an active sport like skating, running, swimming, a team sport, etc.    Try to get 30 to 60 minutes of exercise at least three times a week.    Make healthy choices: don t smoke or drink alcohol; don t use drugs.    In your teen years, you can expect . . .    To develop or strengthen hobbies.    To build strong friendships.    To be more responsible for yourself and your actions.    To be more independent.    To set more goals for yourself.    To use words that best express your thoughts and feelings.    To develop self-confidence and a sense of " self.    To make choices about your education and future career.    To see big differences in how you and your friends grow and develop.    To have body odor from perspiration (sweating).  Use underarm deodorant each day.    To have some acne, sometimes or all the time.  (Talk with your doctor or nurse about this.)    Most girls have finished going through puberty by 15 to 16 years. Often, boys are still growing and building muscle mass.    Sexuality    It is normal to have sexual feelings.    Find a supportive person who can answer questions about puberty, sexual development, sex, abstinence (choosing not to have sex), sexually transmitted diseases (STDs) and birth control.    Think about how you can say no to sex.    Safety    Accidents are the greatest threat to your health and life.    Avoid dangerous behaviors and situations.  For example, never drive after drinking or using drugs.  Never get in a car if the  has been drinking or using drugs.    Always wear a seat belt in the car.  When you drive, make it a rule for all passengers to wear seat belts, too.    Stay within the speed limit and avoid distractions.    Practice a fire escape plan at home. Check smoke detector batteries twice a year.    Keep electric items (like blow dryers, razors, curling irons, etc.) away from water.    Wear a helmet and other protective gear when bike riding, skating, skateboarding, etc.    Use sunscreen to reduce your risk of skin cancer.    Learn first aid and CPR (cardiopulmonary resuscitation).    Avoid peers who try to pressure you into risky activities.    Learn skills to manage stress, anger and conflict.    Do not use or carry any kind of weapon.    Find a supportive person (teacher, parent, health provider, counselor) whom you can talk to when you feel sad, angry, lonely or like hurting yourself.    Find help if you are being abused physically or sexually, or if you fear being hurt by others.    As a teenager, you  will be given more responsibility for your health and health care decisions.  While your parent or guardian still has an important role, you will likely start spending some time alone with your health care provider as you get older.  Some teen health issues are actually considered confidential, and are protected by law.  Your health care team will discuss this and what it means with you.  Our goal is for you to become comfortable and confident caring for your own health.  ================================================================   This patient has been assessed with a concern for Malnutrition and was treated during this hospitalization for the following Nutrition diagnosis/diagnoses:     -  11/24/2022: Moderate protein-calorie malnutrition

## 2022-11-23 NOTE — CONSULT NOTE ADULT - SUBJECTIVE AND OBJECTIVE BOX
Plastic Surgery Consult Note  ---------------------------------------    Chief Complaint:  Patient is a 81y old  Male who presents with a chief complaint of bleeding from abdominal incision (23 Nov 2022 01:59)      HPI:  HPI:  80 yo M w/ HTN, HLD, HFrEF (EF 30-35%), VT, s/p ACID, AS s/p TAVR, CAD s/p stents, GERD, hypothyroid, anxiety, pancreatic ca s/p whipple 10/26/21, chemo and xrt with non-healing abdominal wound treated w/ HBOT and STSG 7/8/22, s/p repeat STSG wound vac placement on 9/29 transferred from OSH for bleeding from abdominal incision site. Pt was recently admitted at [facility] on 10/24-11/18 for similar complains and had wound vac placed by plastic surgery (now removed). At OSH pt was found to have a hgb of 7 (baseline 8-9) and given 1U of prbc before being transferred. Pt states that he is having 6/10 pain at the incisional site. Denies melena, hematochezia or hematuria. Denies chest pain, SOB, palpitation N/V, dizziness or headaches.   Pt afebrile, HR 91, /61 and saturating well on RA.  (23 Nov 2022 01:59).    Plastic surgery was consulted due to his history of wound related issues. During our visit patient had no active bleeding or oozing. He has no complaints of pain, discomfort, or issues with his wound.       PMH  HTN (hypertension)    HLD (hyperlipidemia)    GERD (gastroesophageal reflux disease)    Cardiomyopathy    MSSA bacteremia    Acute osteomyelitis    Pulmonary embolism    Pancreas cancer    [place] (hard of hearing)    Other complications of procedures, not elsewhere classified, subsequent encounter        PSH  History of total hip replacement    History of laminectomy    ICD (implantable cardiac defibrillator) in place    Benign testicular tumor    History of hip replacement    Status post amputation of toe of right foot    Status post fracture of femur    S/P TAVR (transcatheter aortic valve replacement)    H/O Whipple procedure        MEDS  Home Medications:  ALPRAZolam 0.25 mg oral tablet: 1 tab(s) orally once a day (at bedtime) (23 Nov 2022 02:28)  amiodarone 200 mg oral tablet: 1 tab(s) orally once a day AM (23 Nov 2022 02:28)  diphenhydrAMINE 25 mg oral capsule: 1 cap(s) orally every 6 hours, As needed, Rash and/or Itching (23 Nov 2022 02:28)  DULoxetine 60 mg oral delayed release capsule: 1 cap(s) orally once a day (at bedtime) (23 Nov 2022 02:28)  gabapentin 100 mg oral capsule: 1 cap(s) orally 3 times a day (23 Nov 2022 02:28)  levothyroxine 25 mcg (0.025 mg) oral tablet: 1 tab(s) orally once a day AM (23 Nov 2022 02:28)  melatonin 3 mg oral tablet: 1 tab(s) orally once a day (at bedtime), As needed, Insomnia (23 Nov 2022 02:28)  ocular lubricant ophthalmic solution: 1 drop(s) to each affected eye every 2 hours, As needed, Dry Eyes (23 Nov 2022 02:28)  pancrelipase 24,000 units-76,000 units-120,000 units oral delayed release capsule: 3 cap(s) orally 3 times a day (with meals) (23 Nov 2022 02:28)  pantoprazole 40 mg oral delayed release tablet: 1 tab(s) orally once a day AM (23 Nov 2022 02:28)  petrolatum topical ointment: 1 application topically once a day (23 Nov 2022 02:28)  simethicone 80 mg oral tablet, chewable: 1 tab(s) orally 3 times a day, As needed, Gas (23 Nov 2022 02:28)  tamsulosin 0.4 mg oral capsule: 1 cap(s) orally once a day (at bedtime) (23 Nov 2022 02:28)    Allergies    No Known Allergies    Intolerances        Social  Social History:  tobacco: no  Alcohol: no  Illicit drugs: no (23 Nov 2022 01:59)        Physical Exam  Gen: NAD, comfortable  HEENT:  Diffuse swelling throughout face, EOMI b/l , no signs of entrapment. Blood at nares with no signs of septal hematoma,  CN II-XII intact.  Normal occlusion.   Abdomen: Wound bed with granulation tissue and nonviable former skingraft. Appears to be healing appropriately. No active bleeding or sites of oozing.     T(C): 36.7 (11-23-22 @ 05:55), Max: 36.7 (11-23-22 @ 05:55)  HR: 92 (11-23-22 @ 05:55) (91 - 92)  BP: 142/65 (11-23-22 @ 05:55) (124/61 - 142/65)  RR: 18 (11-23-22 @ 05:55) (18 - 18)  SpO2: 100% (11-23-22 @ 05:55) (100% - 100%)  Wt(kg): --  Tmax: T(C): , Max: 36.7 (11-23-22 @ 05:55)  Wt(kg): --      Labs:                        7.8    3.49  )-----------( 149      ( 23 Nov 2022 03:12 )             23.8     11-23    135  |  102  |  11  ----------------------------<  111<H>  4.3   |  23  |  0.57    Ca    8.3<L>      23 Nov 2022 03:12  Phos  2.6     11-23  Mg     1.80     11-23    TPro  6.2  /  Alb  2.7<L>  /  TBili  1.2  /  DBili  x   /  AST  35  /  ALT  20  /  AlkPhos  120  11-23              Imaging  CT max face with :

## 2022-11-23 NOTE — PHYSICAL THERAPY INITIAL EVALUATION ADULT - MANUAL MUSCLE TESTING RESULTS, REHAB EVAL
surgical precautions; bilateral UE at least 3+/5, right LE 3/5, left LE 3-/5/grossly assessed due to

## 2022-11-23 NOTE — H&P ADULT - PROBLEM SELECTOR PLAN 1
-Pt p/w repeat bleeding  from abdominal incision site  -Had recent admission for similar complains with wound vac placed by plastic surgery   -Will consult plastic surgery for wound evaluation -Pt p/w repeat bleeding  from abdominal incision site  -Had recent admission for similar complains with wound vac placed by plastic surgery   -Will consult plastic surgery for wound evaluation in the AM

## 2022-11-23 NOTE — H&P ADULT - NSHPPHYSICALEXAM_GEN_ALL_CORE
LOS: 1d    VITALS:   T(C): 36.6 (11-22-22 @ 23:21), Max: 36.6 (11-22-22 @ 23:21)  HR: 91 (11-22-22 @ 23:21) (91 - 91)  BP: 124/61 (11-22-22 @ 23:21) (124/61 - 124/61)  RR: 18 (11-22-22 @ 23:21) (18 - 18)  SpO2: 100% (11-22-22 @ 23:21) (100% - 100%)    GENERAL: NAD, lying in bed comfortably  HEAD:  Atraumatic, Normocephalic  EYES: EOMI, PERRLA, conjunctiva and sclera clear  ENT: Moist mucous membranes  NECK: Supple, No JVD  CHEST/LUNG: Clear to auscultation bilaterally; No rales, rhonchi, wheezing, or rubs. Unlabored respirations  HEART: Regular rate and rhythm; No murmurs, rubs, or gallops  ABDOMEN: BSx4; Soft, nontender, nondistended  EXTREMITIES:  2+ Peripheral Pulses, brisk capillary refill. No clubbing, cyanosis, or edema  NERVOUS SYSTEM:  A&Ox3, no focal deficits   SKIN: Abdominal incision site wrapped w/ slight bleeding   Psych: Normal mood and affect

## 2022-11-23 NOTE — H&P ADULT - PROBLEM SELECTOR PLAN 4
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 The portal splenic confluence and the central splenic vein are not well visualized and may be thrombosed.   -Pt discharge on xarelto, will hold for now due to bleeding

## 2022-11-23 NOTE — CONSULT NOTE ADULT - ASSESSMENT
81m with h/p pancreatic cancer s/p neoadjuvant gem/abraxane x 5 cycles completed in 9/21, s/p whipple operation 10/26/21. Pathology revealed pancreatic adenocarcinoma, 0/31LN, positive pancreatic resection margin requiring adjuvant RT (SBRT 4000 cGy in 5 fxs) completed in 1/19/22. Post op course c/b non healing abdominal wound, presenting for wound bleeding.  81 year old male with pancreatic cancer s/p neoadjuvant gem/abraxane x 5 cycles completed in 9/21, s/p whipple operation 10/26/21. Pathology revealed pancreatic adenocarcinoma, 0/31LN, positive pancreatic resection margin requiring adjuvant RT (SBRT 4000 cGy in 5 fxs) completed in 1/19/22. Post op course c/b non healing abdominal wound, presenting for wound bleeding.     Pancreatic Cancer  Follows with Dr. Juan at [facility]. Previously treated with gem/abraxane x5 cycles completed in 9/2021. S/p whipple operation 10/26/21. Pathology revealed pancreatic adenocarcinoma, 0/31LN, positive pancreatic resection margin requiring adjuvant RT (SBRT 4000 cGy in 5 fxs) completed in 1/19/22. Post op course c/b non healing abdominal wound. CTAP last admission on 10/26/22 showed new portal vein thrombosis as well as, findings concerning for POD. Pt s/p EUS 11/10/2022 with biopsies; Path from [facility] of pancreatic mass consistent  with  Adenocarcinoma, moderately differentiated. Suggestive of recurred disease. CT chest at that time with no overt evidence of mets but did show an indeterminant 4mm pulm nodule, for which interval 3month CT is indicated.   -Patient evaluated by plastic surgery regarding wound; no surgical intervention; topical recommendations noted  -No plans for systemic therapy while admitted in hospital or  at rehab. Patient to followup at outpatient cancer clinic for further treatment plan.   -Regarding PVT, recommend holding AC given bleeding/bleeding risk  -Monitor CBC and transfuse for Hgb <7

## 2022-11-23 NOTE — DISCHARGE NOTE PROVIDER - HOSPITAL COURSE
82 yo M w/ HTN, HLD, HFrEF (EF 30-35%), VT, s/p ACID, AS s/p TAVR, CAD s/p stents, GERD, hypothyroid, anxiety, pancreatic ca s/p whipple 10/26/21, chemo and xrt with non-healing abdominal wound treated w/ HBOT and STSG 7/8/22, s/p repeat STSG wound vac placement on 9/29 transferred from OSH for bleeding from abdominal incision site. Pt was recently admitted at [facility] on 10/24-11/18 for similar complains and had wound vac placed by plastic surgery (now removed). At OSH pt was found to have a hgb of 7 (baseline 8-9) and given 1U of prbc before being transferred. Pt states that he is having 6/10 pain at the incisional site. Denies melena, hematochezia or hematuria. Denies chest pain, SOB, palpitation N/V, dizziness or headaches.   Pt afebrile, HR 91, /61 and saturating well on RA.     Hospital Course: 80 yo M w/ HTN, HLD, HFrEF (EF 30-35%), VT, s/p ACID, AS s/p TAVR, CAD s/p stents, GERD, hypothyroid, anxiety, pancreatic ca s/p whipple 10/26/21, chemo and xrt with non-healing abdominal wound treated w/ HBOT and STSG 7/8/22, s/p repeat STSG wound vac placement on 9/29 transferred from OSH for bleeding from abdominal incision site. Pt was recently admitted at [facility] on 10/24-11/18 for similar complains and had wound vac placed by plastic surgery (now removed). At OSH pt was found to have a hgb of 7 (baseline 8-9) and given 1U of prbc before being transferred. Pt states that he is having 6/10 pain at the incisional site. Denies melena, hematochezia or hematuria. Denies chest pain, SOB, palpitation N/V, dizziness or headaches.   Pt afebrile, HR 91, /61 and saturating well on RA.     Hospital Course:    Seen by [email or web address]  - no active bleeding, surgicel placed on wound bed at this time, can be removed during next dressing change.   - Recommend every other day aquacel AG, ABD and abdominal binder placement.   - recommend follow up with Dr. Gallegos next week  - no PRS related concerns  - recommend restarting eliquis as per medicine  - rest of care of primary team   82 yo M w/ HTN, HLD, HFrEF (EF 30-35%), VT, s/p ACID, AS s/p TAVR, CAD s/p stents, GERD, hypothyroid, anxiety, pancreatic ca s/p whipple 10/26/21, chemo and xrt with non-healing abdominal wound treated w/ HBOT and STSG 7/8/22, s/p repeat STSG wound vac placement on 9/29 transferred from OSH for bleeding from abdominal incision site. Pt was recently admitted at [facility] on 10/24-11/18 for similar complains and had wound vac placed by plastic surgery (now removed). At OSH pt was found to have a hgb of 7 (baseline 8-9) and given 1U of prbc before being transferred. Pt states that he is having 6/10 pain at the incisional site. Denies melena, hematochezia or hematuria. Denies chest pain, SOB, palpitation N/V, dizziness or headaches.   Pt afebrile, HR 91, /61 and saturating well on RA.     Hospital Course:  82 yo M w/ HTN, HLD, HFrEF (EF 30-35%), VT, s/p ACID, AS s/p TAVR, CAD s/p stents, GERD, hypothyroid, anxiety, pancreatic ca s/p whipple 10/26/21, chemo and xrt with non-healing abdominal wound treated w/ HBOT and STSG 7/8/22, s/p repeat STSG wound vac placement on 9/29 transferred from OSH for bleeding from abdominal incision site. Pt was recently admitted at [facility] on 10/24-11/18 for similar complains and had wound vac placed by plastic surgery (now removed). At OSH pt was found to have a hgb of 7 (baseline 8-9) and given 1U of prbc before being transferred. Pt states that he is having 6/10 pain at the incisional site. Denies melena, hematochezia or hematuria. Denies chest pain, SOB, palpitation N/V, dizziness or headaches.   Pt afebrile, HR 91, /61 and saturating well on RA.      Problem/Plan - 1:  ·  Problem: Open abdominal wall wound.   ·  Plan: Will get Plastics to see today  Open abdominal wall wound.   ·  Plan: -Pt p/w repeat bleeding  from abdominal incision site  -Had recent admission for similar complains with wound vac placed by plastic surgery   -Will consult plastic surgery for wound evaluation.     Problem/Plan - 2:  ·  Problem: Pancreatic cancer.   ·  Plan: s/p Whipple  Problem: Pancreatic cancer.   ·  Plan: s/p whipple, chemo and RT 10/21  - EUS 11/10 w/ biopsies -path +adenocarcinoma c/w recurrence of disease  - Discussion last week with medical oncology and family, pt offered palliative chemo outpt after rehab  -Will consult onc in the AM.  Patient followed by Dr Juan [facility].    Seen by [id number]  - no active bleeding, surgicel placed on wound bed at this time, can be removed during next dressing change.   - Recommend every other day aquacel AG, ABD and abdominal binder placement.   - recommend follow up with Dr. Gallegos next week  - no PRS related concerns  - recommend restarting eliquis as per medicine  - rest of care of primary team   81M PMH of HTN, HLD, HFrEF s/p ACID, AS s/p TAVR, CAD s/p stents, pancreatic ca s/p chemo and radiation therapy s/p whipple on 10/26/21 with resulting non-healing abdominal wound treated with repeat STSG and vac p/w bleeding at abdominal incision site.    Open abdominal wall wound  - pt p/w repeat bleeding  from abdominal incision site  - had recent admission for similar complains with wound vac placed by plastic surgery   - plastics consulted: no surgical intervention; c/w aquacel AG, abd and abdominal binder every other day    Pancreatic cancer  - s/p Whipple, chemo, and RT 10/21  - EUS 11/10 w/ biopsies: path with adenocarcinoma c/w recurrence of disease  - discussion last week w/ medical oncology and family, palliative chemo offered outpatient after rehab  - oncology consulted: no plans for palliative chemo inpatient as patient currently asymptomatic from cancer; patient will need rehabilitation to improve preformance prior to initiation of chemo  - outpatient follow up with Dr. Juan at [facility] on discharge    Portal vein thrombosis  - CT A/P last hospitalization showed: new filling defect within main portal vein extending into right and left portal veins, concerning for near occlusive to occlusive portal venous thrombosis; portal splenic confluence and central splenic vein are not well visualized and may be thrombosed  - pt was on xarelto, on hold for bleeding; will continue to hold xarelto on discharge     Anemia  - baseline Hgb around 8-9, downtrending at last admission; likely due to bleeding from abdominal incision  - received 1U PRBC at outside hospital; hemodynamically stable   - per onc, will hold xarelto on discharge    Chronic systolic congestive heart failure  - currently euvolemic on exam  - c/w home medications    Hypothyroid  - c/w synthroid 25mcg qd    History of BPH  - c/w tamsulosin    Case discussed with  ___ on ___. Patient is medically stable and optimized for discharge as per attending. All medications were reviewed and prescriptions were sent to a mutually agreed upon pharmacy. Discharge plan reviewed with patient and/or family. 81M PMH of HTN, HLD, HFrEF s/p ACID, AS s/p TAVR, CAD s/p stents, pancreatic ca s/p chemo and radiation therapy s/p whipple on 10/26/21 with resulting non-healing abdominal wound treated with repeat STSG and vac p/w bleeding at abdominal incision site.    Open abdominal wall wound  - pt p/w repeat bleeding  from abdominal incision site  - had recent admission for similar complains with wound vac placed by plastic surgery   - plastics consulted: no surgical intervention; c/w aquacel AG, abd and abdominal binder every other day    Pancreatic cancer  - s/p Whipple, chemo, and RT 10/21  - EUS 11/10 w/ biopsies: path with adenocarcinoma c/w recurrence of disease  - discussion last week w/ medical oncology and family, palliative chemo offered outpatient after rehab  - oncology consulted: no plans for palliative chemo inpatient as patient currently asymptomatic from cancer; patient will need rehabilitation to improve preformance prior to initiation of chemo  - outpatient follow up with Dr. Juan at [facility] on discharge    Portal vein thrombosis  - CT A/P last hospitalization showed: new filling defect within main portal vein extending into right and left portal veins, concerning for near occlusive to occlusive portal venous thrombosis; portal splenic confluence and central splenic vein are not well visualized and may be thrombosed  - pt was on xarelto, on hold for bleeding; will continue to hold xarelto on discharge     Anemia  - baseline Hgb around 8-9, downtrending at last admission; likely due to bleeding from abdominal incision  - received 1U PRBC at outside hospital; hemodynamically stable   - per onc, will hold xarelto on discharge    Chronic systolic congestive heart failure  - currently euvolemic on exam  - c/w home medications    Hypothyroid  - c/w synthroid 25mcg qd    History of BPH  - c/w tamsulosin    Case discussed with Dr. Crawford on ___. Patient is medically stable and optimized for discharge to rehab as per attending. Discharge plan reviewed with patient. 81M PMH of HTN, HLD, HFrEF s/p ACID, AS s/p TAVR, CAD s/p stents, pancreatic ca s/p chemo and radiation therapy s/p whipple on 10/26/21 with resulting non-healing abdominal wound treated with repeat STSG and vac p/w bleeding at abdominal incision site.    Open abdominal wall wound  - pt p/w repeat bleeding  from abdominal incision site  - had recent admission for similar complains with wound vac placed by plastic surgery   - plastics consulted: no surgical intervention; c/w aquacel AG, abd and abdominal binder every other day    Pancreatic cancer  - s/p Whipple, chemo, and RT 10/21  - EUS 11/10 w/ biopsies: path with adenocarcinoma c/w recurrence of disease  - discussion last week w/ medical oncology and family, palliative chemo offered outpatient after rehab  - oncology consulted: no plans for palliative chemo inpatient as patient currently asymptomatic from cancer; patient will need rehabilitation to improve preformance prior to initiation of chemo  - outpatient follow up with Dr. Juan at [facility] on discharge    Portal vein thrombosis  - CT A/P last hospitalization showed: new filling defect within main portal vein extending into right and left portal veins, concerning for near occlusive to occlusive portal venous thrombosis; portal splenic confluence and central splenic vein are not well visualized and may be thrombosed  - pt was on xarelto, on hold for bleeding; will continue to hold xarelto on discharge     Anemia  - baseline Hgb around 8-9, downtrending at last admission; likely due to bleeding from abdominal incision  - received 1U PRBC at outside hospital; hemodynamically stable   - per onc, will hold xarelto on discharge    Chronic systolic congestive heart failure  - currently euvolemic on exam  - c/w home medications    Hypothyroid  - c/w synthroid 25mcg qd    History of BPH  - c/w tamsulosin    Case discussed with Dr. Crawford on 11/25. Patient is medically stable and optimized for discharge to rehab as per attending. Discharge plan reviewed with patient.

## 2022-11-23 NOTE — H&P ADULT - NSICDXPASTSURGICALHX_GEN_ALL_CORE_FT
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

## 2022-11-23 NOTE — PHYSICAL THERAPY INITIAL EVALUATION ADULT - ADDITIONAL COMMENTS
Pt is from Rehab where he reports that he has been ambulating with PT using a rolling walker. Pt lives alone in a  co-op apartment; no steps to negotiate; bedroom is on the first floor. Prior to rehab and hospital admission pt was ambulating independently using a rolling walker. Pt denies any recent falls.    Pt left comfortable in bed, NAD, all lines intact, all precautions maintained, with call bell in reach, bed alarm on, [email or web address], and RN aware.

## 2022-11-23 NOTE — DISCHARGE NOTE PROVIDER - CARE PROVIDER_API CALL
Mikhail Gallegos)  Plastic Surgery  [address] [address]  Phone: [phone]  Fax: [phone]  Established Patient  Follow Up Time: 1 week   Mikhail Gallegos)  Plastic Surgery  [address] [address]  Phone: [phone]  Fax: [phone]  Established Patient  Follow Up Time: 1 week    Aden Juan; PhD)  Internal Medicine; Medical Oncology  [address] [place] [address]  Phone: [phone]  Fax: [phone]  Established Patient  Follow Up Time:

## 2022-11-23 NOTE — DISCHARGE NOTE PROVIDER - CARE PROVIDERS DIRECT ADDRESSES
,[email or web address]_Unknown

## 2022-11-23 NOTE — DISCHARGE NOTE PROVIDER - NSDCFUADDAPPT_GEN_ALL_CORE_FT
Follow up with your primary care provider in 1-2 weeks of discharge.    Follow up with Dr. Gallegos, plastic surgery, in 1 week of discharge.    Follow up with Dr. Juan, oncology, on discharge.

## 2022-11-23 NOTE — DISCHARGE NOTE PROVIDER - NSDCFUSCHEDAPPT_GEN_ALL_CORE_FT
[facility] Physician [facility]  CARDIOLOGY [address] [facility] [address]  Scheduled Appointment: 01/18/2023

## 2022-11-23 NOTE — PROGRESS NOTE ADULT - NSPROGADDITIONALINFOA_GEN_ALL_CORE
Addendum 3 pm  D/w Sw/ PA and Oncology Dr Juan  Seen by Plastics and rec to have- topical treatment- this wound is on going dena 10 /2021  No more Xarelto for portal vein thrombosis due to abd wound bleed  If H/h is stable patient can go back to REHAB in 24/28 hrs with Treatment as per Plastics.  Plan agree on by Onc/ Bebeto/ Pa/ Medicine Addendum 3 pm  D/w Sw/ PA and Oncology Dr Juan  Seen by Plastics and rec to have- topical treatment- this wound is on going dena 10 /2021  No more Xarelto for portal vein thrombosis due to abd wound bleed  If H/h is stable patient can go back to REHAB in 24/28 hrs with Treatment as per Plastics.  Plan agree on by Onc/ Bebeto/ Pa/ Medicine    Plastic recs  "Plan:  - no active bleeding, surgicel placed on wound bed at this time, can be removed during next dressing change.   - Recommend every other day aquacel AG, ABD and abdominal binder placement.   - recommend follow up with Dr. Gallegos next week  - no PRS related concerns  - recommend restarting eliquis as per medicine  - rest of care of primary team"

## 2022-11-23 NOTE — DISCHARGE NOTE PROVIDER - NSDCQMAMI_CARD_ALL_CORE
I introduced myself to Harpal Virk and let her know that her nurse navigator reached out to the cancer risk and genetics program on her behalf  I reviewed the following with Harpal Virk:     While the majority of cancer occurs by chance, approximately 5-10% of breast cancer has an underlying genetic cause  Genetic testing is available which can determine if there is an underlying genetic cause to your cancer  Understanding if there is an underlying genetic cause can:  o Provide your surgeon with additional information to help with surgical decisions, treatment decisions and eligibility for clinical trials  o It can determine if you have an increased risk for any additional cancers  o Help family members understand their cancer risk   We [facility] and are reaching out to see if you have interest in genetic testing  The reason we are reaching out at this time is that this result may help your surgeon determine the appropriate type of surgery (i e  lumpectomy vs mastectomy)  This test is not a requirement but can take 5-10 days to complete so we would like to start the process as soon as possible so the results are ready for your appointment with your surgeon   If you are interested in genetic testing, I can collect your family history and initiate genetic testing for you     Patient elected to pursue testing      Diagnosis Details:  o Invasive Ductal Carcinoma  o Left     Personal History:  o Do you have a personal history of any other cancer? No  - If yes type/age of diagnosis:    Family history:   o Do you have Ashkenazi Christian ancestry? No  - If yes, maternal, paternal, or both?  o Do you have any children? No  - How many sons? 0  - How many daughters? 0  - Do any of your children have a history of cancer? No  - If yes type/age of diagnosis:     o Do you have any brothers or sisters? Yes  - How many brothers? 4  - How many sisters?  2  - Are they from the same parents? Yes  - If no how maternal/paternal half-siblings:  - Do any of your brothers or sisters have a history of cancer? No  - If yes who and the type/age of diagnosis:           Do you have nieces or nephews? Yes  - Do any of them have a history of cancer? No  - If yes type/age of diagnosis:    o Does your mother have a history of cancer? No  - If yes, cancer type and age of diagnosis:   - Is your mother still living? No  - Age/Age of death: 70    o Thinking about your mother's family (aunts, uncles, cousins, grandparents) is there anyone with a history of cancer? Yes  - If yes, list relationship, cancer type and age of diagnosis:  Maternal aunt breast ca age 48 ; thyroid ca age 61  Maternal aunt metastatic breast ca age 46  Maternal cousin breast ca age 48     o Does your father have a history of cancer? No  - If yes, cancer type and age of diagnosis:  - Is your father still living? No  - Age/age of death: 72    o Thinking about your father's family (aunts, uncles, cousins, grandparents) is there anyone with a history of cancer? Yes  - If yes, list relationship, cancer type and age of diagnosis:   Paternal aunt breast ca age 61; breast ca age 80; skin ca (face) age 80  Paternal uncle tongue ca age 58 ()  Paternal uncle prostate ca age 61 ()     Types of Results- positive, negative, VUS  o Positive result- may explain personal diagnosis/family history  Can give surgeon information on treatment plan, inform future screening/management or tell a person about other possible risks  Positive results can initiate testing for other family members who may be at risk (children, siblings, etc)  o Negative result- does not give an explanation  Surgical/treatment plan will be based on clinical presentation and will be part of discussion with surgeon  Negative result cannot be passed down to children, but they are still at elevated risk    o Uncertain result- common, but treated like a negative result clinically  90% are downgraded over time   [facility] billing policy   o Most insurance plans cover the cost of genetic testing  The out-of-pocket cost varies due to the differences in deductibles, co-payments and co-insurance defined by individual plans but 90% of people pay $100 or less for a genetic test     A blood kit has been draw and shipped to the lab  We have genetic counselors available, if you have any additional questions or would like to speak with them we can schedule you a 15 minute phone appointment  Plan:    Genetic Testing Preformed: STAT Myriad Panel - BRCA1/2 along with 9 other genes reported in 5-12 calender days with full Myriad [facility] panel reported in 14 calender days or less  [facility] panel (35 genes): BRCA1, BRCA2, MLH1, MSH2, MSH6,  PMS2, EPCAM, APC, MUTYH, CDKN2A, CDK4, TP53, PTEN, STK11, CDH1, BMPR1A, SMAD4, PALB2, CHEK2, SKYLAR, NBN, BARD1, BRIP1, RAD51C, RAD51D, HOXB13, POLD1, POLE, GREM1, AXIN2, GALNT12, MSH3, NTHL1, RPS20 & RNF43      Patient does not have any further questions, declined meeting with genetic counselor    When your results are ready, someone from the genetics team will call you, review the results, and contact your breast surgeon  You will be contacted with any type of result- positive, negative, or uncertain  No

## 2022-11-24 NOTE — PROGRESS NOTE ADULT - PROBLEM SELECTOR PLAN 1
Will get Plastics to see today  Open abdominal wall wound.   ·  Plan: -Pt p/w repeat bleeding  from abdominal incision site  - Had recent admission for similar complains with wound vac placed by plastic surgery   - Plastics consulted,  with no surgical interventions recommending every other day aquacel AG, ABD and abdominal binder placement.

## 2022-11-24 NOTE — DIETITIAN INITIAL EVALUATION ADULT - NSICDXPASTMEDICALHX_GEN_ALL_CORE_FT
PAST MEDICAL HISTORY:  Acute osteomyelitis     Cardiomyopathy     GERD (gastroesophageal reflux disease)     HLD (hyperlipidemia)     [place] (hard of hearing) B/L hearing aids    HTN (hypertension)     MSSA bacteremia 9/2021    Other complications of procedures, not elsewhere classified, subsequent encounter     Pancreas cancer chemo, s/p Whipple    Pulmonary embolism

## 2022-11-24 NOTE — DIETITIAN INITIAL EVALUATION ADULT - OTHER INFO
81 year old male with a PMH of HTN, HFrEF, CAD, pancreatic cancer s/p whipple (10/26/21) p/w bleeding at abdominal incision site per chart.    Patient reports fair appetite in house. Amenable to nutritional supplement. Reports abdominal pain w/o N/V, on CREON per chart. No chewing/swallowing difficulties reported. Has no food allergies. Reports weight loss, but couldn't provide how much. Per HIE, noted w/ weight 90.7 kg (10/19). ABW is 87.5 kg (11/22) per chart, indicating a -3.6% weight loss x 1 month. Noted w/ +1 L/R ankle + foot edema and sacrum stage 2 pressure injury per RN flow sheet.    Patient declined nutrition education at this time.

## 2022-11-24 NOTE — PROGRESS NOTE ADULT - SUBJECTIVE AND OBJECTIVE BOX
[facility] Division of Hospital Medicine  Danyelle Yvette MOFFETT  Pager [phone]  Available via MS Teams    SUBJECTIVE / OVERNIGHT EVENTS: No acute events overnight.     ADDITIONAL REVIEW OF SYSTEMS:    MEDICATIONS  (STANDING):  ALPRAZolam 0.25 milliGRAM(s) Oral at bedtime  aMIOdarone    Tablet 200 milliGRAM(s) Oral daily  chlorhexidine 2% Cloths 1 Application(s) Topical daily  DULoxetine 60 milliGRAM(s) Oral at bedtime  gabapentin 100 milliGRAM(s) Oral three times a day  levothyroxine 25 MICROGram(s) Oral daily  mupirocin 2% Ointment 1 Application(s) Topical two times a day  pancrelipase  (CREON 24,000 Lipase Units) 3 Capsule(s) Oral three times a day with meals  pantoprazole    Tablet 40 milliGRAM(s) Oral before breakfast  tamsulosin 0.4 milliGRAM(s) Oral at bedtime    MEDICATIONS  (PRN):  diphenhydrAMINE 25 milliGRAM(s) Oral every 6 hours PRN Rash and/or Itching  melatonin 3 milliGRAM(s) Oral at bedtime PRN Insomnia  oxyCODONE    IR 5 milliGRAM(s) Oral every 6 hours PRN Severe Pain (7 - 10)  simethicone 80 milliGRAM(s) Chew three times a day PRN Gas      I&O's Summary      PHYSICAL EXAM:  Vital Signs Last 24 Hrs  T(C): 36.2 (24 Nov 2022 06:47), Max: 36.3 (23 Nov 2022 21:44)  T(F): 97.2 (24 Nov 2022 06:47), Max: 97.4 (23 Nov 2022 21:44)  HR: 88 (24 Nov 2022 06:47) (86 - 88)  BP: 123/56 (24 Nov 2022 06:47) (111/57 - 123/56)  BP(mean): --  RR: 16 (24 Nov 2022 06:47) (16 - 18)  SpO2: 98% (24 Nov 2022 06:47) (98% - 100%)    Parameters below as of 24 Nov 2022 06:47  Patient On (Oxygen Delivery Method): room air      CONSTITUTIONAL: NAD, well-developed, well-groomed  EYES: PERRLA; conjunctiva and sclera clear  ENMT: Moist oral mucosa, no pharyngeal injection or exudates; normal dentition  NECK: Supple, no palpable masses; no thyromegaly  RESPIRATORY: Normal respiratory effort; lungs are clear to auscultation bilaterally  CARDIOVASCULAR: Regular rate and rhythm, normal S1 and S2, no murmur/rub/gallop; No lower extremity edema; Peripheral pulses are 2+ bilaterally  ABDOMEN: Nontender to palpation, normoactive bowel sounds, no rebound/guarding; No hepatosplenomegaly  MUSCULOSKELETAL:  Normal gait; no clubbing or cyanosis of digits; no joint swelling or tenderness to palpation  PSYCH: A+O to person, place, and time; affect appropriate  NEUROLOGY: CN 2-12 are intact and symmetric; no gross sensory deficits   SKIN: No rashes; no palpable lesions    LABS:                        7.7    4.05  )-----------( 162      ( 24 Nov 2022 07:43 )             24.1     11-24    131<L>  |  99  |  11  ----------------------------<  146<H>  3.6   |  21<L>  |  0.63    Ca    8.4      24 Nov 2022 07:00  Phos  2.9     11-24  Mg     1.70     11-24    TPro  6.2  /  Alb  2.7<L>  /  TBili  1.2  /  DBili  x   /  AST  35  /  ALT  20  /  AlkPhos  120  11-23              COVID-19 PCR: NotDetec (23 Nov 2022 18:29)  COVID-19 PCR: NotDetec (17 Nov 2022 13:03)  SARS-CoV-2: NotDetec (10 Nov 2022 23:29)  COVID-19 PCR: NotDetec (09 Nov 2022 10:45)  COVID-19 PCR: NotDetec (06 Nov 2022 10:50)  COVID-19 PCR: NotDetec (03 Nov 2022 15:15)  COVID-19 PCR: NotDetec (31 Oct 2022 06:51)  COVID-19 PCR: NotDetec (27 Oct 2022 13:25)  COVID-19 PCR: NotDetec (24 Oct 2022 21:43)  COVID-19 PCR: Detected (05 Oct 2022 13:28)  COVID-19 PCR: NotDetec (20 Jun 2022 17:19)  COVID-19 PCR: NotDetec (14 Jun 2022 19:01)  COVID-19 PCR: NotDetec (06 Jun 2022 17:15)  COVID-19 PCR: NotDetec (31 May 2022 19:10)      RADIOLOGY & ADDITIONAL TESTS:  New Results Reviewed Today:   New Imaging Personally Reviewed Today:  New Electrocardiogram Personally Reviewed Today:  Prior or Outpatient Records Reviewed Today:    COMMUNICATION:  Care Discussed with Consultants/Other Providers and Details of Discussion:  Discussions with Patient/Family:  PCP Communication:     [facility] Division of Hospital Medicine  CONNORrayray Yvette MOFFETT  Pager [phone]  Available via MS Teams    SUBJECTIVE / OVERNIGHT EVENTS: No acute events overnight. Patient feels well overall, States that had bleeding abdominal wound site overnight.    ADDITIONAL REVIEW OF SYSTEMS:    MEDICATIONS  (STANDING):  ALPRAZolam 0.25 milliGRAM(s) Oral at bedtime  aMIOdarone    Tablet 200 milliGRAM(s) Oral daily  chlorhexidine 2% Cloths 1 Application(s) Topical daily  DULoxetine 60 milliGRAM(s) Oral at bedtime  gabapentin 100 milliGRAM(s) Oral three times a day  levothyroxine 25 MICROGram(s) Oral daily  mupirocin 2% Ointment 1 Application(s) Topical two times a day  pancrelipase  (CREON 24,000 Lipase Units) 3 Capsule(s) Oral three times a day with meals  pantoprazole    Tablet 40 milliGRAM(s) Oral before breakfast  tamsulosin 0.4 milliGRAM(s) Oral at bedtime    MEDICATIONS  (PRN):  diphenhydrAMINE 25 milliGRAM(s) Oral every 6 hours PRN Rash and/or Itching  melatonin 3 milliGRAM(s) Oral at bedtime PRN Insomnia  oxyCODONE    IR 5 milliGRAM(s) Oral every 6 hours PRN Severe Pain (7 - 10)  simethicone 80 milliGRAM(s) Chew three times a day PRN Gas      I&O's Summary      PHYSICAL EXAM:  Vital Signs Last 24 Hrs  T(C): 36.2 (24 Nov 2022 06:47), Max: 36.3 (23 Nov 2022 21:44)  T(F): 97.2 (24 Nov 2022 06:47), Max: 97.4 (23 Nov 2022 21:44)  HR: 88 (24 Nov 2022 06:47) (86 - 88)  BP: 123/56 (24 Nov 2022 06:47) (111/57 - 123/56)  BP(mean): --  RR: 16 (24 Nov 2022 06:47) (16 - 18)  SpO2: 98% (24 Nov 2022 06:47) (98% - 100%)    Parameters below as of 24 Nov 2022 06:47  Patient On (Oxygen Delivery Method): room air      CONSTITUTIONAL: NAD, well-developed, well-groomed  RESPIRATORY: Normal respiratory effort; lungs are clear to auscultation bilaterally  CARDIOVASCULAR: Regular rate and rhythm, normal S1 and S2, no murmur/rub/gallop; No lower extremity edema; Peripheral pulses are 2+ bilaterally  ABDOMEN: _ abdominal scar wound, with oozing appreciated  Nontender to palpation, normoactive bowel sounds, no rebound/guarding;   MUSCULOSKELETAL:  Normal gait; no clubbing or cyanosis of digits; no joint swelling or tenderness to palpation  PSYCH: A+O to person, place, and time; affect appropriate  NEUROLOGY: CN 2-12 are intact and symmetric; no gross sensory deficits   SKIN: No rashes; no palpable lesions    LABS:                        7.7    4.05  )-----------( 162      ( 24 Nov 2022 07:43 )             24.1     11-24    131<L>  |  99  |  11  ----------------------------<  146<H>  3.6   |  21<L>  |  0.63    Ca    8.4      24 Nov 2022 07:00  Phos  2.9     11-24  Mg     1.70     11-24    TPro  6.2  /  Alb  2.7<L>  /  TBili  1.2  /  DBili  x   /  AST  35  /  ALT  20  /  AlkPhos  120  11-23              COVID-19 PCR: NotDetec (23 Nov 2022 18:29)  COVID-19 PCR: NotDetec (17 Nov 2022 13:03)  SARS-CoV-2: NotDetec (10 Nov 2022 23:29)  COVID-19 PCR: NotDetec (09 Nov 2022 10:45)  COVID-19 PCR: NotDetec (06 Nov 2022 10:50)  COVID-19 PCR: NotDetec (03 Nov 2022 15:15)  COVID-19 PCR: NotDetec (31 Oct 2022 06:51)  COVID-19 PCR: NotDetec (27 Oct 2022 13:25)  COVID-19 PCR: NotDetec (24 Oct 2022 21:43)  COVID-19 PCR: Detected (05 Oct 2022 13:28)  COVID-19 PCR: NotDetec (20 Jun 2022 17:19)  COVID-19 PCR: NotDetec (14 Jun 2022 19:01)  COVID-19 PCR: NotDetec (06 Jun 2022 17:15)  COVID-19 PCR: NotDetec (31 May 2022 19:10)

## 2022-11-24 NOTE — DIETITIAN INITIAL EVALUATION ADULT - PERTINENT LABORATORY DATA
11-24    131<L>  |  99  |  11  ----------------------------<  146<H>  3.6   |  21<L>  |  0.63    Ca    8.4      24 Nov 2022 07:00  Phos  2.9     11-24  Mg     1.70     11-24    TPro  6.2  /  Alb  2.7<L>  /  TBili  1.2  /  DBili  x   /  AST  35  /  ALT  20  /  AlkPhos  120  11-23  A1C with Estimated Average Glucose Result: 5.8 % (11-01-22 @ 06:56)  A1C with Estimated Average Glucose Result: 5.8 % (03-18-22 @ 11:28)

## 2022-11-24 NOTE — DIETITIAN INITIAL EVALUATION ADULT - PERTINENT MEDS FT
MEDICATIONS  (STANDING):  ALPRAZolam 0.25 milliGRAM(s) Oral at bedtime  aMIOdarone    Tablet 200 milliGRAM(s) Oral daily  chlorhexidine 2% Cloths 1 Application(s) Topical daily  DULoxetine 60 milliGRAM(s) Oral at bedtime  gabapentin 100 milliGRAM(s) Oral three times a day  levothyroxine 25 MICROGram(s) Oral daily  mupirocin 2% Ointment 1 Application(s) Topical two times a day  pancrelipase  (CREON 24,000 Lipase Units) 3 Capsule(s) Oral three times a day with meals  pantoprazole    Tablet 40 milliGRAM(s) Oral before breakfast  tamsulosin 0.4 milliGRAM(s) Oral at bedtime    MEDICATIONS  (PRN):  diphenhydrAMINE 25 milliGRAM(s) Oral every 6 hours PRN Rash and/or Itching  melatonin 3 milliGRAM(s) Oral at bedtime PRN Insomnia  oxyCODONE    IR 5 milliGRAM(s) Oral every 6 hours PRN Severe Pain (7 - 10)  simethicone 80 milliGRAM(s) Chew three times a day PRN Gas

## 2022-11-24 NOTE — DIETITIAN INITIAL EVALUATION ADULT - NSICDXPASTSURGICALHX_GEN_ALL_CORE_FT
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

## 2022-11-24 NOTE — PROGRESS NOTE ADULT - PROBLEM SELECTOR PLAN 2
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   - Patient followed by Dr Juan at [facility], will followup outpeitn once completed with rehab s/p whipple, chemo and RT 10/21  - EUS 11/10 w/ biopsies -path +adenocarcinoma c/w recurrence of disease  - Discussion last week with medical oncology and family, pt offered palliative chemo outpt after rehab   - Seen bu Onc, no plans for palliative chemo will inpatient as patient is currently asx from his cancer, indicated patient will need rehabilitation and improved performance prior to initiation of any chemotherapy.   - Patient followed by Dr Juan at [facility], will followup outpatient once completed with rehab

## 2022-11-24 NOTE — DIETITIAN INITIAL EVALUATION ADULT - ORAL INTAKE PTA/DIET HISTORY
Patient seen for assessment. Patient receptive to interview, but provided limited information. Reports fair appetite PTA. Was at rehab facility PTA.

## 2022-11-24 NOTE — DIETITIAN INITIAL EVALUATION ADULT - ADD RECOMMEND
Continue diet as ordered. Consider vitamin C for wound healing. Document PO intake to monitor trend.

## 2022-11-25 NOTE — PROGRESS NOTE ADULT - ASSESSMENT
80 yo M w/ HTN, HLD, HFrEF (EF 30-35%), VT, s/p ACID, AS s/p TAVR, CAD s/p stents, GERD, hypothyroid, anxiety, pancreatic ca s/p whipple 10/26/21, chemo and xrt with non-healing abdominal wound treated w/ HBOT and STSG 7/8/22, s/p repeat STSG wound vac placement on 9/29 transferred from OSH for bleeding from abdominal incision site. Pt was recently admitted at [facility] on 10/24-11/18 for similar complains and had wound vac placed by plastic surgery (now removed). At OSH pt was found to have a hgb of 7 (baseline 8-9) and given 1U of prbc before being transferred. Pt states that he is having 6/10 pain at the incisional site. Denies melena, hematochezia or hematuria. Denies chest pain, SOB, palpitation N/V, dizziness or headaches.   Pt afebrile, HR 91, /61 and saturating well on RA. 
80 yo M w/ HTN, HLD, HFrEF (EF 30-35%), VT, s/p ACID, AS s/p TAVR, CAD s/p stents, GERD, hypothyroid, anxiety, pancreatic ca s/p whipple 10/26/21, chemo and xrt with non-healing abdominal wound treated w/ HBOT and STSG 7/8/22, s/p repeat STSG wound vac placement on 9/29 transferred from OSH for bleeding from abdominal incision site. Pt was recently admitted at [facility] on 10/24-11/18 for similar complains and had wound vac placed by plastic surgery (now removed). At OSH pt was found to have a hgb of 7 (baseline 8-9) and given 1U of prbc before being transferred. Pt states that he is having 6/10 pain at the incisional site. Denies melena, hematochezia or hematuria. Denies chest pain, SOB, palpitation N/V, dizziness or headaches.   Pt afebrile, HR 91, /61 and saturating well on RA. 
82 yo M w/ HTN, HLD, HFrEF (EF 30-35%), VT, s/p ACID, AS s/p TAVR, CAD s/p stents, GERD, hypothyroid, anxiety, pancreatic ca s/p whipple 10/26/21, chemo and xrt with non-healing abdominal wound treated w/ HBOT and STSG 7/8/22, s/p repeat STSG wound vac placement on 9/29 transferred from OSH for bleeding from abdominal incision site. Pt was recently admitted at [facility] on 10/24-11/18 for similar complains and had wound vac placed by plastic surgery (now removed). At OSH pt was found to have a hgb of 7 (baseline 8-9) and given 1U of prbc before being transferred. Pt states that he is having 6/10 pain at the incisional site. Denies melena, hematochezia or hematuria. Denies chest pain, SOB, palpitation N/V, dizziness or headaches.   Pt afebrile, HR 91, /61 and saturating well on RA.

## 2022-11-25 NOTE — DISCHARGE NOTE NURSING/CASE MANAGEMENT/SOCIAL WORK - PATIENT PORTAL LINK FT
You can access the FollowMyHealth Patient Portal offered by [facility] by registering at the following website: [email or web address]. By joining [employer]’s FollowMyHealth portal, you will also be able to view your health information using other applications (apps) compatible with our system.

## 2022-11-25 NOTE — PROGRESS NOTE ADULT - NUTRITIONAL ASSESSMENT
This patient has been assessed with a concern for Malnutrition and has been determined to have a diagnosis/diagnoses of Moderate protein-calorie malnutrition.    This patient is being managed with:   Diet DASH/TLC-  Sodium & Cholesterol Restricted  Supplement Feeding Modality:  Oral  Ensure Plus High Protein Cans or Servings Per Day:  2       Frequency:  Daily  Entered: Nov 24 2022  2:31PM

## 2022-11-25 NOTE — PROGRESS NOTE ADULT - PROBLEM SELECTOR PLAN 3
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 The portal splenic confluence and the central splenic vein are not well visualized and may be thrombosed.   -Pt discharge on xarelto, on hold for now given increased risk of bleeding and repeated hospitalization
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 The portal splenic confluence and the central splenic vein are not well visualized and may be thrombosed.   -Pt discharge on xarelto, on hold for now given increased risk of bleeding and repeated hospitalization
Portal vein thrombosis.   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 The portal splenic confluence and the central splenic vein are not well visualized and may be thrombosed.   -Pt discharge on xarelto, will hold for now due to bleeding.

## 2022-11-25 NOTE — DISCHARGE NOTE NURSING/CASE MANAGEMENT/SOCIAL WORK - NSDCPEFALRISK_GEN_ALL_CORE
For information on Fall & Injury Prevention, visit: [email or web address] OR  [email or web address] OR  https://www.cdc.gov/steadi/patient.html

## 2022-11-25 NOTE — PROGRESS NOTE ADULT - PROBLEM SELECTOR PLAN 4
- Baseline hgb around 8-9, downtrending at last admission. Likely due to bleeding from abdominal incision   - Received 1U prbc at outside hospital, Hgb 7.7 this AM, hemodynamically stable   - will continue to trend q12 hours and maintain active type and screen   - transfuse for Hgb <7
- Baseline hgb around 8-9, downtrending at last admission. Likely due to bleeding from abdominal incision   - Received 1U prbc at outside hospital, Hgb 7.7 this AM, hemodynamically stable   - will continue to trend q12 hours and maintain active type and screen   - transfuse for Hgb <7
Anemia.   ·  Plan: -Baseline hgb around 8-9, downtrending at last admission. Likely due to bleeding from abdominal incision   -Received 1U prbc at outside hospital, will get repeat CBC and transfuse if hgb <7.    CBC Q12

## 2022-11-25 NOTE — PROGRESS NOTE ADULT - SUBJECTIVE AND OBJECTIVE BOX
[facility] Division of Hospital Medicine  CONNORrayray Yvette MOFFETT  Pager [phone]  Available via MS Teams    SUBJECTIVE / OVERNIGHT EVENTS: Overnight, patient with bleeding from abdominal site. CBC stable. Patient hemodynamically stable     ADDITIONAL REVIEW OF SYSTEMS:    MEDICATIONS  (STANDING):  ALPRAZolam 0.25 milliGRAM(s) Oral at bedtime  aMIOdarone    Tablet 200 milliGRAM(s) Oral daily  chlorhexidine 2% Cloths 1 Application(s) Topical daily  DULoxetine 60 milliGRAM(s) Oral at bedtime  gabapentin 100 milliGRAM(s) Oral three times a day  levothyroxine 25 MICROGram(s) Oral daily  mupirocin 2% Ointment 1 Application(s) Topical two times a day  pancrelipase  (CREON 24,000 Lipase Units) 3 Capsule(s) Oral three times a day with meals  pantoprazole    Tablet 40 milliGRAM(s) Oral before breakfast  tamsulosin 0.4 milliGRAM(s) Oral at bedtime    MEDICATIONS  (PRN):  diphenhydrAMINE 25 milliGRAM(s) Oral every 6 hours PRN Rash and/or Itching  melatonin 3 milliGRAM(s) Oral at bedtime PRN Insomnia  oxyCODONE    IR 5 milliGRAM(s) Oral every 6 hours PRN Severe Pain (7 - 10)  simethicone 80 milliGRAM(s) Chew three times a day PRN Gas      I&O's Summary      PHYSICAL EXAM:  Vital Signs Last 24 Hrs  T(C): 36.7 (25 Nov 2022 06:30), Max: 36.9 (24 Nov 2022 21:00)  T(F): 98 (25 Nov 2022 06:30), Max: 98.4 (24 Nov 2022 21:00)  HR: 98 (25 Nov 2022 06:30) (82 - 98)  BP: 142/76 (25 Nov 2022 06:30) (124/68 - 142/76)  BP(mean): --  RR: 18 (25 Nov 2022 06:30) (18 - 18)  SpO2: 100% (25 Nov 2022 06:30) (99% - 100%)    Parameters below as of 25 Nov 2022 06:30  Patient On (Oxygen Delivery Method): room air      CONSTITUTIONAL: NAD, well-developed, well-groomed  EYES: PERRLA; conjunctiva and sclera clear  RESPIRATORY: Normal respiratory effort; lungs are clear to auscultation bilaterally  CARDIOVASCULAR: Regular rate and rhythm, normal S1 and S2, no murmur/rub/gallop; No lower extremity edema; Peripheral pulses are 2+ bilaterally  ABDOMEN: abdominal incision wound with some bloody discharge, Nontender to palpation, normoactive bowel sounds, no rebound/guarding;   MUSCULOSKELETAL: no clubbing or cyanosis of digits; no joint swelling or tenderness to palpation  PSYCH: A+O to person, place, and time; affect appropriate  NEUROLOGY: CN 2-12 are intact and symmetric; no gross sensory deficits   SKIN: No rashes; no palpable lesions    LABS:                        8.9    4.84  )-----------( 178      ( 25 Nov 2022 05:20 )             27.9     11-25    134<L>  |  103  |  11  ----------------------------<  203<H>  3.8   |  21<L>  |  0.65    Ca    8.6      25 Nov 2022 05:20  Phos  3.2     11-25  Mg     1.60     11-25                COVID-19 PCR: NotDetec (23 Nov 2022 18:29)  COVID-19 PCR: NotDetec (17 Nov 2022 13:03)  SARS-CoV-2: NotDetec (10 Nov 2022 23:29)  COVID-19 PCR: NotDetec (09 Nov 2022 10:45)  COVID-19 PCR: NotDetec (06 Nov 2022 10:50)  COVID-19 PCR: NotDetec (03 Nov 2022 15:15)  COVID-19 PCR: NotDetec (31 Oct 2022 06:51)  COVID-19 PCR: NotDetec (27 Oct 2022 13:25)  COVID-19 PCR: NotDetec (24 Oct 2022 21:43)  COVID-19 PCR: Detected (05 Oct 2022 13:28)  COVID-19 PCR: NotDetec (20 Jun 2022 17:19)  COVID-19 PCR: NotDetec (14 Jun 2022 19:01)  COVID-19 PCR: NotDetec (06 Jun 2022 17:15)  COVID-19 PCR: NotDetec (31 May 2022 19:10)

## 2022-11-25 NOTE — PROGRESS NOTE ADULT - PROBLEM SELECTOR PLAN 8
DVT ppx: [facility] on hold for now   Diet: Regular Diet  Dispo: [facility]
DVT ppx: AGUILA [facility] on hold for now   Diet: Regular Diet  Dispo: [facility] d/c planning today,
teds b/l

## 2022-11-25 NOTE — PROGRESS NOTE ADULT - PROBLEM SELECTOR PLAN 2
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   - Patient followed by Dr Juan at [facility], will followup outpatient once completed with rehab

## 2022-11-28 NOTE — ED PROVIDER NOTE - PATIENT PORTAL LINK FT
You can access the FollowMyHealth Patient Portal offered by [facility] by registering at the following website: [email or web address]. By joining "[employer]’s FollowMyHealth portal, you will also be able to view your health information using other applications (apps) compatible with our system.

## 2022-11-28 NOTE — ED PROVIDER NOTE - NSICDXPASTMEDICALHX_GEN_ALL_CORE_FT
PAST MEDICAL HISTORY:  Acute osteomyelitis     Cardiomyopathy     GERD (gastroesophageal reflux disease)     HLD (hyperlipidemia)     [place] (hard of hearing) B/L hearing aids    HTN (hypertension)     MSSA bacteremia 9/2021    Other complications of procedures, not elsewhere classified, subsequent encounter     Pancreas cancer chemo, s/p Whipple    Pulmonary embolism

## 2022-11-28 NOTE — ED ADULT NURSE NOTE - NSICDXPASTMEDICALHX_GEN_ALL_CORE_FT
PAST MEDICAL HISTORY:  Acute osteomyelitis     Cardiomyopathy     GERD (gastroesophageal reflux disease)     HLD (hyperlipidemia)     [place] (hard of hearing) B/L hearing aids    HTN (hypertension)     MSSA bacteremia 9/2021    Other complications of procedures, not elsewhere classified, subsequent encounter     Pancreas cancer chemo, s/p Whipple    Pulmonary embolism

## 2022-11-28 NOTE — ED PROVIDER NOTE - ATTENDING CONTRIBUTION TO CARE
I, Calos Feng, performed a history and physical exam of the patient and discussed their management with the resident and/or advanced care provider. I reviewed the resident and/or advanced care provider's note and agree with the documented findings and plan of care except where noted. I was present and available for all procedures.    80 yo M PMHx HTN, s/p Whipple for pancreatic CA, Pe on Xarelto, s/p TAVR s/p ICD, presenting from rehab for generalized fatigue and weakness, decreased PO intake, and near syncopal episode today. reportedly, BP at [facility] was very low and thus, was brought to ED. Pt also has been having pain in his R groin with swelling. Denies cp, sob, brbpr, melena, n/v/d, dysuria, fevers, chills.     PHYSICAL EXAM:  GENERAL: non-toxic appearing; in no respiratory distress; mildly pale appearing  HEAD Atraumatic, Normocephalic  NECK: No JVD; trachea midline  EYES: PERRL, EOMs intact b/l w/out deficits; pale conjunctiva  CHEST/LUNG: CTAB no wheezes/rhonchi/rales  HEART: RRR no murmur/gallops/rubs  ABDOMEN: soft, NT, ND; R groin with inguinal hernia that is reducible, minimally tender; no overlying skin changes; no erythema   EXTREMITIES: No LE edema, +2 radial pulses b/l, +2 DP/PT pulses b/l  MUSCULOSKELETAL: FROM of all 4 extremities;  NERVOUS SYSTEM:  A&Ox3, No motor deficits or sensory deficits; CNII-XII intact; Speech is fluent and appropriate  SKIN:  Warm and dry as visualized     MDM: pt presenting with near s yncopal episode and R inguinal pain; at [facility] rehab, pt not eating as much. reviewed pt's prior CTAP and there is evidence of a fat containing R inguinal hernia. the hernia does not show evidence of strangulation or incarceration. in terms of the near syncopal episode, likely i/s/o dehdyration and poor PO intake however, will need to eval for anemia, metabolic, and/or infectious etiology. given degree of pain in the groin with a hernia, will also re-check CTAP. will reassess

## 2022-11-28 NOTE — ED ADULT NURSE NOTE - NSFALLRSKOUTCOME_ED_ALL_ED
FEMALE MIKEY;      GA 33.5 weeks;     Age: 4 d;   PMA: _____      Current Status: Prematurity, RDS, Presumed sepsis, Thermoregulation    Weight: 1955 -61     Intake(ml/kg/day): 75  Urine output: 3.2  Stools (frequency): x2  Other:     *******************************************************  FEN: EHM 3ml, then 6 ml q3h. TPN D10 IL3 P4 at 95 ml/kg/day. Glucose monitoring as per protocol.   ADWG:  ________ (G/kg/day / date); [place] %: _______  (%/date) ; HC:    ACCESS: PIV in place. Ongoing need is accessed daily.   Respiratory: RDS. Maintain bubble NCPAP 6 21%, adjust as necessary. Blood gases PRN.    CV: Stable hemodynamics. Continue cardiorespiratory monitoring.   Hem: Hyperbilirubinemia due to prematurity s/p phototherapy 6/21. Monitor bilirubin, Monitor for anemia and thrombocytopenia.  ID: Monitor for signs and symptoms of sepsis. DC ABx as BCx negative.  Neuro: NDE PTD.   Thermal: Immature thermoregulation, requires incubator.   Ortho: Breech presentation at birth. Screening hip US at 44-46 weeks of PMA.  Social: Mother updated at bedside (NR), 6/20  Labs/Images/Studies: BLT in AM FEMALE MIKEY;      GA 33.5 weeks;     Age: 4 d;   PMA: _____      Current Status: Prematurity, RDS, Presumed sepsis, Thermoregulation    Weight: 1980 + 25     Intake(ml/kg/day): 98  Urine output: 2.7  Stools (frequency): x 0  Other:     *******************************************************  FEN:  increase EHM 9, 12  ml q3h (43) . TPN D10 IL3 P4 at 110 ml/kg/day. Glucose monitoring as per protocol.   ADWG:  ________ (G/kg/day / date); [place] %: _______  (%/date) ; HC:    ACCESS: PIV in place. Ongoing need is assessed daily.   Respiratory: RDS. Maintain bubble NCPAP 6 21%,  attempt to wean to + 5 today  adjust as necessary. Blood gases PRN.    CV: Stable hemodynamics. Continue cardiorespiratory monitoring.   Hem: Hyperbilirubinemia due to prematurity s/p phototherapy 6/21.  now with rebound but still below photo threshold , continue to Monitor bilirubin, Monitor for anemia and thrombocytopenia.  ID: Monitor for signs and symptoms of sepsis.  s/p  ABx as BCx negative.  Neuro: NDE PTD.   Thermal: Immature thermoregulation, requires incubator.   Ortho: Breech presentation at birth. Screening hip US at 44-46 weeks of PMA.  Social: Mother updated at bedside (NR), 6/20  Labs/Images/Studies:  sharif dudley in AM   bili at  9 AM today  in AM Fall Risk

## 2022-11-28 NOTE — ED PROVIDER NOTE - PHYSICAL EXAMINATION
GENERAL: Awake, alert, NAD  HEAD: NC/AT, neck supple, moist mucous membranes  EYES: PERRL, EOM grossly intact, sclera anicteric  LUNGS: CTAB, no wheezes or crackles   CARDIAC: RRR, no m/r/g; strong femoral pulses and DP pulses bilaterally.  ABDOMEN: Soft, palpable mass to R inguinal region, likely hernia, mildly tender to palp over the area  EXT: No edema, no calf tenderness, no deformities.  NEURO: A&Ox3. Moving all extremities.  SKIN: Appears pale, skin warm, dry. No rash.  PSYCH: Normal affect.

## 2022-11-28 NOTE — ED ADULT NURSE NOTE - IS THE PATIENT ABLE TO BE SCREENED?
55yr old female c/o menstrual cycle for 2 weeks. as per patient " I had the same issue last yr and my hemoglobin was low, I just want to make sure its not low today" denies pain at this time Yes

## 2022-11-28 NOTE — ED PROVIDER NOTE - PROGRESS NOTE DETAILS
Calos Feng MD. ct noted and explaiend to pt and son at bedside. pt denies any pain at this time. he appears much improved and he states he subjetively feels much improved. his abd is soft, nt nd. again, the hernia over the site does not show any signs of strangulation or incarceration; and is reducible. pt stable for dc back to [facility].

## 2022-11-28 NOTE — ED PROVIDER NOTE - NSFOLLOWUPINSTRUCTIONS_ED_ALL_ED_FT
You were seen in the ER today for abdominal pain and lightheadedness. You were seen in the ER today for abdominal pain and lightheadedness.    Your CT scan showed that you have a hernia in your right groin region, there was no sign of a bowel obstruction or other new problem in the abdomen. Your blood work also looked good - no signs of low hemoglobin or significant electrolyte abnormalities. The results of all of your blood work and CT scans are included in this paperwork.    You should follow up with your primary doctor in the next week to be re-evaluated.    **Please come back to the ER to be evaluated if your abdominal pain gets significantly worse, if you develop nausea/vomiting and are unable to tolerate food or drink without vomiting, if you have high fevers, or you are otherwise concerned**

## 2022-11-28 NOTE — ED PROVIDER NOTE - CLINICAL SUMMARY MEDICAL DECISION MAKING FREE TEXT BOX
81Y M PMH significant for HTN, history of pancreatic cancer status post Whipple procedure, PE on Xarelto, ICD, status post TAVR 2021, presenting from [facility] with 1 day of right groin pain. *** 81Y M PMH significant for HTN, history of pancreatic cancer status post Whipple procedure, PE on Xarelto, ICD, status post TAVR 2021, presenting from [facility] with 1 day of right groin pain. Describes intermittent dizziness/lightheadedness. 81Y M PMH significant for HTN, history of pancreatic cancer status post Whipple procedure, PE on Xarelto, ICD, status post TAVR 2021, presenting from [facility] with 1 day of right groin pain. Describes intermittent dizziness/lightheadedness. exam notable for palpable mass over R inguinal area, likely hernia, is reducible. Strong femoral pulses bilaterally. Patient endorsing nausea, will give Zofran. Plan for basic labs including Coags as patient is on Xarelto, and obtain CT abd/pelvis to evaluate for possible SBO, vs other new intraabd pathology. R inguinal hernia demonstrated on prior CT scan per chart review.

## 2022-11-28 NOTE — ED PROVIDER NOTE - NSICDXPASTSURGICALHX_GEN_ALL_CORE_FT
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

## 2022-11-28 NOTE — ED ADULT NURSE NOTE - OBJECTIVE STATEMENT
Pt presents to the ED A&Ox4 w son at bedside co R groin pain x1 day. significant for HTN, history of pancreatic cancer status post Whipple procedure, PE on Xarelto, ICD, status post TAVR 2021.  Patient currently staying at [facility], has recently had several days of generalized weakness/lightheadedness, decreased PO intake.As per son, pt hypotensive in ED. Pt at this time 90s systolic. Pt appears jaundiced, No acute distress noted at this time.

## 2022-11-28 NOTE — ED PROVIDER NOTE - OBJECTIVE STATEMENT
81Y M PMH significant for HTN, history of pancreatic cancer status post Whipple procedure, PE on Xarelto, ICD, status post TAVR 2021, presenting with 1 day of right groin pain. Patient currently staying at [facility], has recently had several days of generalized weakness/lightheadedness, decreased PO intake. Stood up today and became very lightheaded, had subsequent onset of severe R groin pain and swelling. 81Y M PMH significant for HTN, history of pancreatic cancer status post Whipple procedure, PE on Xarelto, ICD, status post TAVR 2021, presenting with 1 day of right groin pain. Patient currently staying at [facility], has recently had several days of generalized weakness/lightheadedness, decreased PO intake. Stood up today and became very lightheaded, had subsequent onset of severe R groin pain and swelling. No history of similar pain to this area, denies known history of hernias. No recent fevers, chills, nausea or vomiting, urinary sxs, leg pain or swelling.

## 2022-11-28 NOTE — ED ADULT NURSE NOTE - NSICDXPASTSURGICALHX_GEN_ALL_CORE_FT
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

## 2022-12-02 NOTE — ED ADULT NURSE REASSESSMENT NOTE - NS ED NURSE REASSESS COMMENT FT1
Received report from PATRICIA Jameson RN. Received report from PATRICIA Jameosn RN. Patient, AOx1 and presenting from [facility]. Patient MAP currently 67 on Levophed, 0.25 mcg/kg/min. Received report from PATRICIA Jameson RN. Patient, AOx1 and presenting from [facility]. Patient MAP currently 67 on Levophed, 0.25 mcg/kg/min in R peripheral IV. Abdominal binder in place.

## 2022-12-02 NOTE — CHART NOTE - NSCHARTNOTEFT_GEN_A_CORE
: Gabino MAYBERRY    INDICATION: resp distress    PROCEDURE:  [X] LIMITED ECHO  [X] LIMITED CHEST  [ ] LIMITED RETROPERITONEAL  [ ] LIMITED ABDOMINAL  [ ] LIMITED DVT  [ ] NEEDLE GUIDANCE VASCULAR  [ ] NEEDLE GUIDANCE THORACENTESIS  [ ] NEEDLE GUIDANCE PARACENTESIS  [ ] NEEDLE GUIDANCE PERICARDIOCENTESIS  [ ] OTHER    FINDINGS:     Lung: bilateral A lines anterior and lateral    Cardiac: Limited views Nl LV fxn, no RV dilation IVC indeterminate size LVOT VTI 15.1    INTERPRETATION:    Normal aeration pattern No major cardiac limitation

## 2022-12-02 NOTE — ED ADULT NURSE NOTE - NSIMPLEMENTINTERV_GEN_ALL_ED
Implemented All Fall with Harm Risk Interventions:  [place] to call system. Call bell, personal items and telephone within reach. Instruct patient to call for assistance. Room bathroom lighting operational. Non-slip footwear when patient is off stretcher. Physically safe environment: no spills, clutter or unnecessary equipment. Stretcher in lowest position, wheels locked, appropriate side rails in place. Provide visual cue, wrist band, yellow gown, etc. Monitor gait and stability. Monitor for mental status changes and reorient to person, place, and time. Review medications for side effects contributing to fall risk. Reinforce activity limits and safety measures with patient and family. Provide visual clues: red socks.

## 2022-12-02 NOTE — CONSULT NOTE ADULT - SUBJECTIVE AND OBJECTIVE BOX
HPI:  "82yo M pmh HTN/HLD, HFrEF (s/p AICD), AS (s/p TAVR), CAD (PCI w MICKIE), and Panc Ca (s/p Whipple 10/26/26 c/b non-healing midline incision), Skin graft for abdominal wound (), portal vein thrombosis - presenting from [facility] for concern of sepsis with hypotension and tachycardia. Pt initially reported no complaints to EMS, received 1L NS en route with BP 90s/50s, IO placed prior to transfer. Pt now complains of mild diffuse headache, 5/10, non-radiating, constant with no associated symptoms or modifying factors. Pt unable to comply with exam 2/2 AMS." Plastics consulted for wound management.     PAST MEDICAL & SURGICAL HISTORY:  - mentioned in HPI above.     REVIEW OF SYSTEMS  - mentioned in HPI above    Allergic/Immunologic: Unknown    MEDICATIONS  (STANDING):  norepinephrine Infusion 0.05 MICROgram(s)/kG/Min (7.03 mL/Hr) IV Continuous <Continuous>  sodium chloride 0.9%. 1000 milliLiter(s) (50 mL/Hr) IV Continuous <Continuous>    MEDICATIONS  (PRN):  Allergies  Allergy Status Unknown  Intolerances    SOCIAL HISTORY:  Smoking Hx: denies  Etoh Hx: denies  IVDA Hx: denies    FAMILY HISTORY:  unless noted, no significant family hx with Mother, Father, Siblings    Vital Signs Last 24 Hrs  T(C): 36.3 (02 Dec 2022 07:02), Max: 38.2 (02 Dec 2022 04:00)  T(F): 97.3 (02 Dec 2022 07:02), Max: 100.7 (02 Dec 2022 04:00)  HR: 84 (02 Dec 2022 08:28) (80 - 94)  BP: 121/52 (02 Dec 2022 08:28) (64/37 - 121/59)  BP(mean): 69 (02 Dec 2022 08:28) (45 - 78)  RR: 15 (02 Dec 2022 08:28) (15 - 24)  SpO2: 100% (02 Dec 2022 08:28) (95% - 100%)    Parameters below as of 02 Dec 2022 08:28  Patient On (Oxygen Delivery Method): room air    General: WN/WD NAD, resting comfortably  Abdominal wound: Large fungating midline abdominal mass approx. 12cm x 6cm. No bleeding, infection or purulence.     LABS:                        7.5    7.95  )-----------( 166      ( 02 Dec 2022 04:07 )             23.8     12-    137  |  106  |  9   ----------------------------<  129<H>  3.7   |  25  |  0.81    Ca    8.3<L>      02 Dec 2022 04:08    TPro  6.2  /  Alb  2.6<L>  /  TBili  1.4<H>  /  DBili  x   /  AST  37  /  ALT  18  /  AlkPhos  99  12-02    PT/INR - ( 02 Dec 2022 04:07 )   PT: 22.0 sec;   INR: 1.88 ratio         PTT - ( 02 Dec 2022 04:07 )  PTT:32.7 sec  Urinalysis Basic - ( 02 Dec 2022 04:08 )    Color: Yellow / Appearance: Clear / S.027 / pH: x  Gluc: x / Ketone: Negative  / Bili: Negative / Urobili: Negative   Blood: x / Protein: 30 mg/dL / Nitrite: Negative   Leuk Esterase: Negative / RBC: 3 /hpf / WBC 1 /HPF   Sq Epi: x / Non Sq Epi: 0 /hpf / Bacteria: Negative    RADIOLOGY & ADDITIONAL STUDIES:

## 2022-12-02 NOTE — H&P ADULT - NSICDXPASTSURGICALHX_GEN_ALL_CORE_FT
PAST SURGICAL HISTORY:  H/O Whipple procedure 2021, c/b nonhealing wound    S/P TAVR (transcatheter aortic valve replacement)     Status post skin graft using Integra wound dressing

## 2022-12-02 NOTE — ED ADULT NURSE REASSESSMENT NOTE - NS ED NURSE REASSESS COMMENT FT1
Indwelling urinary "hernandez" catheter inserted using sterile technique. Procedure, risks, and benefits of catheter explained to patient, patient unable to verbalize understanding due to acuity of illness. Second RN present to confirm sterility. Pt tolerated well. Urinary catheter drained clear nohemi urine, no clots visualized. Bedside drainage to gravity. Stat lock in place.

## 2022-12-02 NOTE — ED PROVIDER NOTE - OBJECTIVE STATEMENT
82yo M pmh HTN/HLD, HFrEF (s/p AICD), AS (s/p TAVR), CAD (PCI w MICKIE), and Panc Ca (s/p Whipple 10/26/26 c/b non-healing midline incision), portal vein thrombosis - presenting from [facility] for concern of sepsis with hypotension and tachycardia. Pt initially reported no complaints to EMS, received 1L NS en route with BP 90s/50s, IO placed prior to transfer. Pt now complains of mild diffuse headache, 5/10, non-radiating, constant with no associated symptoms or modifying factors. Pt unable to comply with exam 2/2 AMS. 80yo M pmh HTN/HLD, HFrEF (s/p AICD), AS (s/p TAVR), CAD (PCI w MICKIE), and Panc Ca (s/p Whipple 10/26/26 c/b non-healing midline incision), Skin graft for abdominal wound (7/022), portal vein thrombosis - presenting from [facility] for concern of sepsis with hypotension and tachycardia. Pt initially reported no complaints to EMS, received 1L NS en route with BP 90s/50s, IO placed prior to transfer. Pt now complains of mild diffuse headache, 5/10, non-radiating, constant with no associated symptoms or modifying factors. Pt unable to comply with exam 2/2 AMS.

## 2022-12-02 NOTE — H&P ADULT - NSICDXPASTMEDICALHX_GEN_ALL_CORE_FT
PAST MEDICAL HISTORY:  AICD (automatic cardioverter/defibrillator) present     Aortic stenosis     HFrEF (heart failure with reduced ejection fraction)     Hyperlipidemia     Hypertension     Nonhealing surgical wound     Pancreatic cancer     Portal vein thrombosis     Pulmonary embolus

## 2022-12-02 NOTE — ED PROVIDER NOTE - CARE PLAN
1 Principal Discharge DX:	Sepsis   Principal Discharge DX:	Pulmonary embolism  Secondary Diagnosis:	Sepsis, unspecified organism

## 2022-12-02 NOTE — ED PROVIDER NOTE - PROGRESS NOTE DETAILS
Rodrigue Duncan, PGY-3: Given hypotension despite 1L NS from EMS, b/l small effusions on POCUS, pt febrile. Will start levo and place on maintanence fluids. Abx hanging and hernandez placed. Dr. oGmez: Received signout from Dr. Banerjee.  Patient presenting status post Whipple 1 year ago and a skin graft 1 month ago, hypotensive on arrival.  Found to be septic.  Broad-spectrum antibiotics given.  Levophed drip started for hypotension.  Patient's blood pressure is now in the 120s systolic.  On scans patient found to have PEs.  MICU consulted.  Given recent surgery and anemia will hold off on anticoagulation at this time as per MICU.  Results and decision to hold anticoagulation relayed to patient and daughter at the bedside.  H&H compared to previous.  Baseline hemoglobin 8. No active bleeding at this time Dr. Gomez: Received signout from Dr. Quan.  Patient presenting status post Whipple 1 year ago and a skin graft 1 month ago, hypotensive on arrival.  Found to be septic.  Broad-spectrum antibiotics given.  Levophed drip started for hypotension.  Patient's blood pressure is now in the 120s systolic.  On scans patient found to have PEs.  MICU consulted.  Given recent surgery and anemia will hold off on anticoagulation at this time as per MICU.  Results and decision to hold anticoagulation relayed to patient and daughter at the bedside.  H&H compared to previous.  Baseline hemoglobin 8. No active bleeding at this time ROCAEL Jackson (PGY-3) - pt stable at this time, finding of PE on CT chest, Pt does have hx of PE. d/w micu given low HH, and abdominal bleeding, will hold AC for now. Plastics consulted regarding wound care for bleeding abdominal wound. I do not believe that the bleeding abdominal wound is the primary cause of pt's hypotension. admit to Dr. Lloyd.

## 2022-12-02 NOTE — H&P ADULT - HISTORY OF PRESENT ILLNESS
80 yo M (Alternate MRN [id number] Name: Cody Gatica) w/ HTN, HLD, HFrEF (EF 30-35%), VT, s/p ACID, AS s/p TAVR, CAD s/p stents, GERD, hypothyroid, anxiety, pancreatic ca s/p whipple 10/26/21, chemo and xrt with non-healing abdominal wound treated w/ HBOT and STSG 7/8/22, s/p repeat STSG wound vac placement on 9/29 and recurrent hospitalizations for bleeding at graft site presents from [facility] with hypotension and tachycardia.    In EMS, IO placed, given 1L bolus. On arrival to ED BP 70s/40s, given additional 500cc bolus, started on levophed & maintenance fluids. 82 yo M (Alternate MRN [id number] Name: Cody Gatica) w/ HTN, HLD, HFrEF (EF 30-35%), VT, s/p ACID, AS s/p TAVR, CAD s/p stents, GERD, hypothyroid, anxiety, pancreatic ca s/p whipple 10/26/21, chemo and xrt with non-healing abdominal wound treated w/ HBOT and STSG 7/8/22, s/p repeat STSG wound vac placement on 9/29, PE on Xarelto, and recurrent hospitalizations for bleeding at graft site presents from [facility] with hypotension and tachycardia.    In EMS, IO placed, given 1L bolus. On arrival to ED Tmax 100.9, BP 70s/40s, given additional 500cc bolus, started on levophed & maintenance fluids. HR 80s, RR 10s-20s, O2 sat 100% on RA. CT w/ evidence of typhlitis & colitis, acute PE w/o evidence of RH strain. Given Tylenol, vancomycin/Zosyn. Admitted to MICU for further management.

## 2022-12-02 NOTE — ED ADULT NURSE NOTE - OBJECTIVE STATEMENT
81y male PMH HTN, HLD, HF w AIDC, CAD, AS w TAVR, pancreatic cancer s/p whipple 10/26 with subsequent non healing abdominal midline incision wound to the ED from [facility] via EMS c/o of hypoxia. As per EMS starting about an hour prior to arrival pt began increasingly confused, vital signs were checked by staff and found pt to tachycardic to the 200s and hypoxic to the mid 80s. With EMS- pt was hypotensive to the 60s/40s, tachycardic to 120s but not hypoxic. Pt is A&Ox1- unable to the provide history at this time- not complaining of any pain. Pt has actively bleeding wound in the midline abdominal area with present abdominal binder. Pt has a stage 1 chronic pressure ulcer on the sacrum. IO in place in the right tibia by EMS- flushing well. Upon arrival pt is hypotensive and tachycardic- pt on cardiac monitoring. Stretcher in lowest position and locked, appropriate side rails in place, room cleared of clutter and safety hazards,  blankets given for comfort

## 2022-12-02 NOTE — ED PROVIDER NOTE - PHYSICAL EXAMINATION
GENERAL: non-toxic appearing, alert, in NAD  HEENT: atraumatic, normocephalic, Vision grossly intact, no conjunctivitis or discharge, hearing grossly intact,  no nasal discharge, epistaxis   CARDIAC: tachy regular, normal S1S2,  no appreciable murmurs, no cyanosis, cap refill < 2 seconds  PULM: normal work of breathing, oxygen saturation on RA wnl, CTAB, no crackles, rales, rhonchi, or wheezing  GI: abdomen nondistended, soft, TTP in epigastrium over midline incision, no guarding or rebound tenderness, no palpable masses  NEURO: awake and alert, follows commands, normal speech, PERRLA, EOMI, no focal motor or sensory deficits  MSK: spine appears normal, no joint swelling or erythema, ranging all extremities with no appreciable loss of ROM  EXT: Several missing LE digits, no peripheral edema, calf tenderness, redness or swelling  SKIN: cool clammy extremities with reticulation,  stage 1 sacral wound, open abdominal midline wound with light sanguinous discharge  PSYCH: appropriate mood and affect

## 2022-12-02 NOTE — CONSULT NOTE ADULT - ASSESSMENT
ASSESSMENT:   81M PMHx HTN/HLD, HFrEF (s/p AICD), AS (s/p TAVR), CAD (PCI w MICKIE), and Panc Ca (s/p Whipple 10/26/26 c/b non-healing midline incision), Skin graft for abdominal wound (7/022), portal vein thrombosis - presenting from [facility] for concern of sepsis with hypotension and tachycardia. Plastics consulted for wound management.     PLAN:  - Continue local wound care for abdominal wound: WTD dressing, ABD pad, and abdominal binder.  - Rest of care per primary team.  ASSESSMENT:   81M PMHx HTN/HLD, HFrEF (s/p AICD), AS (s/p TAVR), CAD (PCI w MICKIE), and Panc Ca (s/p Whipple 10/26/26 c/b non-healing midline incision), Skin graft for abdominal wound (7/022), portal vein thrombosis - presenting from [facility] for concern of sepsis with hypotension and tachycardia. Plastics consulted for wound management.     PLAN:  - Abdominal wound: Hydrogel and aquacel three times a week (or hydrogel dressing twice a week).  - Rest of care per primary team.  ASSESSMENT:   81M PMHx HTN/HLD, HFrEF (s/p AICD), AS (s/p TAVR), CAD (PCI w MICKIE), and Panc Ca (s/p Whipple 10/26/26 c/b non-healing midline incision), Skin graft for abdominal wound (7/022), portal vein thrombosis - presenting from [facility] for concern of sepsis with hypotension and tachycardia. Plastics consulted for wound management.     PLAN:  - Abdominal wound: apply hydrogel and aquacel pad every other day.  - Rest of care per primary team.

## 2022-12-02 NOTE — H&P ADULT - ASSESSMENT
#NEURO    #CARDIOVASCULAR    #RESPIRATORY    #GI/FEN    #RENAL/METABOLIC    #HEME/ONC    #ENDOCRINE    #ID    #ETHICS 80 yo M (Alternate MRN [id number] Name: Cody Gatica) w/ HTN, HLD, HFrEF (EF 30-35%), VT, s/p ACID, AS s/p TAVR, CAD s/p stents, GERD, hypothyroid, anxiety, pancreatic ca s/p whipple 10/26/21, chemo and xrt with non-healing abdominal wound treated w/ HBOT and STSG 7/8/22, s/p repeat STSG wound vac placement on 9/29, PE on Xarelto, and recurrent hospitalizations for bleeding at graft site presents from [facility] with hypotension, tachycardia, fever, likely i/s/o septic shock, admitted to MICU for further management.    #NEURO    #CARDIOVASCULAR    #RESPIRATORY    #GI/FEN    #RENAL/METABOLIC    #HEME/ONC    #ENDOCRINE    #ID    #ETHICS 80 yo M (Alternate MRN [id number] Name: Cody Gatica) w/ HTN, HLD, HFrEF (EF 30-35%), VT, s/p AICD, AS s/p TAVR, CAD s/p stents, GERD, hypothyroid, anxiety, pancreatic ca s/p whipple 10/26/21, chemo and xrt with non-healing abdominal wound treated w/ HBOT and STSG 7/8/22, s/p repeat STSG wound vac placement on 9/29, PE on Xarelto, and recurrent hospitalizations for bleeding at graft site presents from [facility] with hypotension, tachycardia, fever, likely i/s/o septic shock, admitted to MICU for further management.    #NEURO  //Encephalopathy  - AOx2 on exam, AOx3 at baseline  - Likely i/s/o sepsis  - Neuro checks per ICU protocol    #SKIN  //Nonhealing abdominal wound  - S/p skin grafting  - Recurrent hospitalizations for bleeding from site  - Plastic surgery consulted, appreciate recs  - Wound care: apply hydrogel & aquacel pad every other day    #CARDIOVASCULAR  //Shock state, likely septic    //HFrEF s/p AICD    //AS s/p TAVR    //Pulmonary emboli    //HLD    #RESPIRATORY  - O2 sat appropriate on RA  - No active issues    #GI/FEN  - NPO while encephalopathic    //Typhlitis    #RENAL/METABOLIC  - SCr appropriate, CTM    #HEME/ONC  //Pancreatic cancer  - S/p whipple, chemotherapy XRT  - Per family with recent recurrent of disease  - No disease modifying therapy while inpatient    #ENDOCRINE  - POCT glucose q6hr while NPO    //Hypothyroidism    #ID  //Septic shock  - CTAP w/ typhlitis, colitis  - UA WNL  - S/p 1.5L IVF  - S/p Vanc/Zosyn in ED  - Continue empiric antibiotics    #ETHICS  - To discuss goals of care with family  - Per initial discussion with daughter Kristi (reportedly not HCP), patient would not want CPR or intubation, will discuss with other children for further decision making.  80 yo M (Alternate MRN [id number] Name: Cody Gatica) w/ HTN, HLD, HFrEF (EF 30-35%), VT, s/p AICD, AS s/p TAVR, CAD s/p stents, GERD, hypothyroid, anxiety, pancreatic ca s/p whipple 10/26/21, chemo and xrt with non-healing abdominal wound treated w/ HBOT and STSG 7/8/22, s/p repeat STSG wound vac placement on 9/29, PE on Xarelto, and recurrent hospitalizations for bleeding at graft site presents from [facility] with hypotension, tachycardia, fever, likely i/s/o septic shock, admitted to MICU for further management.    #NEURO  //Encephalopathy  - AOx2 on exam, AOx3 at baseline  - Likely i/s/o sepsis  - Neuro checks per ICU protocol    #SKIN  //Nonhealing abdominal wound  - S/p skin grafting  - Recurrent hospitalizations for bleeding from site  - Plastic surgery consulted, appreciate recs  - Wound care: apply hydrogel & aquacel pad every other day    #CARDIOVASCULAR  //Shock state, likely septic  - Management of shock as per below  - On levophed, wean for MAP >65    //HFrEF s/p AICD  - Reportedly not on any medications  - Appears euvolemic on exam  - CTM    //AS s/p TAVR  - Reportedly not on AC    //Pulmonary emboli  - Reportedly not on AC outpatient 2/2 recurrent bleeding at nonhealing wound site    //HLD  - Reportedly not on statin    #RESPIRATORY  - O2 sat appropriate on RA  - No active issues    #GI/FEN  - NPO while encephalopathic    //Typhlitis, colitis  - Continue with antibiotics as per below  - NPO for now    #RENAL/METABOLIC  - SCr appropriate, CTM    #HEME/ONC  //Pancreatic cancer  - S/p whipple, chemotherapy XRT  - Per family with recent recurrent of disease  - No disease modifying therapy while inpatient    #ENDOCRINE  - POCT glucose q6hr while NPO    //Hypothyroidism  - Holding home synthroid    #ID  //Septic shock  - CTAP w/ typhlitis, colitis  - UA WNL  - S/p 1.5L IVF  - S/p Vanc/Zosyn in ED  - Continue empiric antibiotics  - F/u infectious workup    #ETHICS  - To discuss goals of care with family  - Per initial discussion with daughter Kristi (reportedly not HCP), patient would not want CPR or intubation, will discuss with other children for further decision making.  80 yo M (Alternate MRN [id number] Name: Cody Gatica) w/ HTN, HLD, HFrEF (EF 30-35%), VT, s/p AICD, AS s/p TAVR, CAD s/p stents, GERD, hypothyroid, anxiety, pancreatic ca s/p whipple 10/26/21, chemo and xrt with non-healing abdominal wound treated w/ HBOT and STSG 7/8/22, s/p repeat STSG wound vac placement on 9/29, PE on Xarelto, and recurrent hospitalizations for bleeding at graft site presents from [facility] with hypotension, tachycardia, fever, likely i/s/o septic shock, admitted to MICU for further management.    #NEURO  //Encephalopathy  - AOx2 on exam, AOx3 at baseline  - Likely i/s/o sepsis  - Neuro checks per ICU protocol    //Neuropathy  - Holding home duloxetine/gabapentin for now while NPO    //Anxiety  - Holding home Xanax    #SKIN  //Nonhealing abdominal wound  - S/p skin grafting  - Recurrent hospitalizations for bleeding from site  - Plastic surgery consulted, appreciate recs  - Wound care: apply hydrogel & aquacel pad every other day    #CARDIOVASCULAR  //Shock state, likely septic  - Management of shock as per below  - On levophed, wean for MAP >65    //HFrEF s/p AICD  - Holding home Entresto, Toprol i/s/o shock state  - Holding home amiodarone for now  - Euvolemic on exam, CTM    //AS s/p TAVR  - Not on AC    //Pulmonary emboli  - Reportedly not on AC outpatient 2/2 recurrent bleeding at nonhealing wound site  - POCUS w/o RV dilation  - Hold AC for now    //HLD  - Reportedly not on statin    #RESPIRATORY  - O2 sat appropriate on RA  - No active issues    #GI/FEN  - NPO while encephalopathic    //Typhlitis, colitis  - Continue with antibiotics as per below  - NPO for now    #RENAL/METABOLIC  - SCr appropriate, CTM  - Jimenez in place  - Holding home Flomax while NPO    #HEME/ONC  //Pancreatic cancer  - S/p whipple, chemotherapy XRT  - Per family with recent recurrent of disease  - No disease modifying therapy while inpatient    #ENDOCRINE  - POCT glucose q6hr while NPO    //Hypothyroidism  - Holding home synthroid while NPO    #ID  //Septic shock  - CTAP w/ typhlitis, colitis  - UA WNL  - S/p 1.5L IVF  - S/p Vanc/Zosyn in ED  - Continue empiric antibiotics  - F/u infectious workup    #ETHICS  - To hold GOC discussions with patient's family members 82 yo M (Alternate MRN [id number] Name: Cody Gatica) w/ HTN, HLD, HFrEF (EF 30-35%), VT, s/p AICD, AS s/p TAVR, CAD s/p stents, GERD, hypothyroid, anxiety, pancreatic ca s/p whipple 10/26/21, chemo and xrt with non-healing abdominal wound treated w/ HBOT and STSG 7/8/22, s/p repeat STSG wound vac placement on 9/29, PE on Xarelto, and recurrent hospitalizations for bleeding at graft site presents from [facility] with hypotension, tachycardia, fever, likely i/s/o septic shock, admitted to MICU for further management.    #NEURO  //Encephalopathy  - AOx2 on exam, AOx3 at baseline  - Likely i/s/o sepsis  - Neuro checks per ICU protocol    //Neuropathy  - Holding home duloxetine/gabapentin for now while NPO    //Anxiety  - Holding home Xanax    #SKIN  //Nonhealing abdominal wound  - S/p skin grafting  - Recurrent hospitalizations for bleeding from site  - Plastic surgery consulted, appreciate recs  - Wound care: apply hydrogel & aquacel pad every other day    #CARDIOVASCULAR  //Shock state, likely septic  - Management of shock as per below  - On levophed, wean for MAP >65    //HFrEF s/p AICD  - Holding home Entresto, Toprol i/s/o shock state  - Holding home amiodarone for now  - Euvolemic on exam, CTM    //AS s/p TAVR  - Not on AC    //Pulmonary emboli  - Reportedly not on AC outpatient 2/2 recurrent bleeding at nonhealing wound site  - POCUS w/o RV dilation  - Hold AC for now    //HLD  - Reportedly not on statin    #RESPIRATORY  - O2 sat appropriate on RA  - No active issues    #GI/FEN  - NPO while encephalopathic    //Typhlitis, colitis  - Continue with antibiotics as per below  - NPO for now    #RENAL/METABOLIC  - SCr appropriate, CTM  - Jimenez in place  - Holding home Flomax while NPO    #HEME/ONC  //Pancreatic cancer  - S/p whipple, chemotherapy XRT  - Per family with recent recurrent of disease  - No disease modifying therapy while inpatient    #ENDOCRINE  - POCT glucose q6hr while NPO    //Hypothyroidism  - Holding home synthroid while NPO    #ID  //Septic shock, unclear source  - Patient w/ history of MSSA bacteremia from foot wound 9/2021, 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

## 2022-12-02 NOTE — H&P ADULT - NSHPPHYSICALEXAM_GEN_ALL_CORE
VITALS:   T(C): 36.3 (12-02-22 @ 07:02), Max: 38.2 (12-02-22 @ 04:00)  HR: 84 (12-02-22 @ 08:28) (80 - 94)  BP: 121/52 (12-02-22 @ 08:28) (64/37 - 121/59)  RR: 15 (12-02-22 @ 08:28) (15 - 24)  SpO2: 100% (12-02-22 @ 08:28) (95% - 100%)    GENERAL: Somnolent, intermittently responsive  HEAD:  Atraumatic, Normocephalic  EYES: EOMI, PERRLA, conjunctiva and sclera clear  ENT: Moist mucous membranes  NECK: Supple, No JVD  CHEST/LUNG: Clear to auscultation bilaterally; No rales, rhonchi, wheezing, or rubs. Unlabored respirations  HEART: Regular rate and rhythm; No murmurs, rubs, or gallops  ABDOMEN: BSx4; Soft, nontender, nondistended  EXTREMITIES:  2+ Peripheral Pulses, brisk capillary refill. No clubbing, cyanosis, or edema  NERVOUS SYSTEM:  A&Ox2, no focal deficits   SKIN: No rashes or lesions

## 2022-12-02 NOTE — PROGRESS NOTE ADULT - ATTENDING COMMENTS
Patient examined and care reviewed in detail on bedside rounds  Critically ill and unstable on pressors with probable sepsis from GI source/recurrent pancreatic ca  Frequent bedside visits with therapy change today.   I have personally provided 35+ minutes of critical care time concurrently with the resident/fellow; this excludes time spent on separate procedures. Patient examined and care reviewed in detail on bedside rounds  Critically ill and unstable on pressors with probable sepsis from GI source/recurrent pancreatic ca/lactic acidosis  Frequent bedside visits with therapy change today.   I have personally provided 35+ minutes of critical care time concurrently with the resident/fellow; this excludes time spent on separate procedures.

## 2022-12-02 NOTE — PATIENT PROFILE ADULT - AGENT'S NAME
S/W pt, he said he can't tell yet if it's helping  The soreness from the procedure isn't to bad  His wife has had to reinforced the edges of the tape b/c they were curling up but there is no blood noted  His temp is normal and he is taking his abx  He plans to go for a good walk later b/c he normally gets pain after walking 45 min  Pt has contact # for [employer] rep and our office  I confirmed lead removal fro wed 3/20/19  Chandler-(SON) phone# [phone]

## 2022-12-02 NOTE — ED PROVIDER NOTE - CLINICAL SUMMARY MEDICAL DECISION MAKING FREE TEXT BOX
80yo M presenting from [facility] tachy and hypotensive w malodorous bleeding abdominal wound. EKG is LBBB with low voltage. Pt tender abdomen without peritoneal signs. Cool clammy extremities.   - Concern for sepsis, source likely abd wound. Sepsis bundle and abx, CT a/p  - h.o portal vein thrombosis now tachycardic, CTA chest  - Headache, AMS w h.o panc cancer - CT head and ofirmev  - Likely MICU given need for pressors, may be SICU if surgical abdomen

## 2022-12-02 NOTE — PATIENT PROFILE ADULT - FALL HARM RISK - HARM RISK INTERVENTIONS

## 2022-12-02 NOTE — H&P ADULT - ATTENDING COMMENTS
Patient examined and care reviewed in detail on bedside rounds  Critically ill and unstable on vent/pressors with sepsis/lactic acidosis/confusion/recurrent pancreatic ca  Frequent bedside visits with therapy change today.   I have personally provided 35+ minutes of critical care time concurrently with the resident/fellow; this excludes time spent on separate procedures.    Pt had goal directed echo within 24 hours of MICU admission  Goals of care discussion had with family within 24 hours of MICU admission  Patient on DVT prophylaxis within 24 hours of MICU admission

## 2022-12-03 NOTE — PROGRESS NOTE ADULT - SUBJECTIVE AND OBJECTIVE BOX
INTERVAL HPI/OVERNIGHT EVENTS:  - Lactate trending down overnight with additional fluids  - Levophed requirements coming down  - IO removed  - Started on pureed diet    SUBJECTIVE: Patient seen and examined at bedside.      VITAL SIGNS:  ICU Vital Signs Last 24 Hrs  T(C): 37.5 (03 Dec 2022 07:30), Max: 38.5 (02 Dec 2022 11:45)  T(F): 99.5 (03 Dec 2022 07:30), Max: 101.3 (02 Dec 2022 11:45)  HR: 96 (03 Dec 2022 07:30) (80 - 133)  BP: 111/51 (03 Dec 2022 07:30) (80/56 - 140/66)  BP(mean): 74 (03 Dec 2022 07:30) (60 - 95)  ABP: --  ABP(mean): --  RR: 31 (03 Dec 2022 07:30) (15 - 40)  SpO2: 99% (03 Dec 2022 07:30) (94% - 100%)    O2 Parameters below as of 02 Dec 2022 20:00  Patient On (Oxygen Delivery Method): room air            Plateau pressure:   P/F ratio:     - @ 07:01  -  12-03 @ 07:00  --------------------------------------------------------  IN: 1810.3 mL / OUT: 2135 mL / NET: -324.7 mL      CAPILLARY BLOOD GLUCOSE        ECG:    PHYSICAL EXAM:  GENERAL: Somnolent, intermittently responsive  HEAD:  Atraumatic, Normocephalic  EYES: EOMI, PERRLA, conjunctiva and sclera clear  ENT: Moist mucous membranes  NECK: Supple, No JVD  CHEST/LUNG: Clear to auscultation bilaterally; No rales, rhonchi, wheezing, or rubs. Unlabored respirations  HEART: Regular rate and rhythm; No murmurs, rubs, or gallops  ABDOMEN: BSx4; Soft, nontender, nondistended  EXTREMITIES:  2+ Peripheral Pulses, brisk capillary refill. No clubbing, cyanosis, or edema  NERVOUS SYSTEM:  A&Ox2, no focal deficits   SKIN: No rashes or lesions    MEDICATIONS:  MEDICATIONS  (STANDING):  chlorhexidine 4% Liquid 1 Application(s) Topical <User Schedule>  norepinephrine Infusion 0.05 MICROgram(s)/kG/Min (7.03 mL/Hr) IV Continuous <Continuous>  piperacillin/tazobactam IVPB.. 3.375 Gram(s) IV Intermittent every 8 hours  vancomycin  IVPB 1000 milliGRAM(s) IV Intermittent every 24 hours    MEDICATIONS  (PRN):  acetaminophen     Tablet .. 650 milliGRAM(s) Oral every 6 hours PRN Temp greater or equal to 38C (100.4F)      ALLERGIES:  Allergies    No Known Allergies    Intolerances        LABS:                        7.3    9.33  )-----------( 156      ( 03 Dec 2022 00:15 )             22.9     12-    134<L>  |  104  |  27<H>  ----------------------------<  155<H>  4.3   |  19<L>  |  0.91    Ca    8.2<L>      03 Dec 2022 00:17  Phos  3.7     12-  Mg     1.9         TPro  5.8<L>  /  Alb  2.4<L>  /  TBili  1.1  /  DBili  x   /  AST  78<H>  /  ALT  38  /  AlkPhos  122<H>  12-03    PT/INR - ( 03 Dec 2022 00:15 )   PT: 25.1 sec;   INR: 2.17 ratio         PTT - ( 03 Dec 2022 00:15 )  PTT:36.9 sec  Urinalysis Basic - ( 02 Dec 2022 04:08 )    Color: Yellow / Appearance: Clear / S.027 / pH: x  Gluc: x / Ketone: Negative  / Bili: Negative / Urobili: Negative   Blood: x / Protein: 30 mg/dL / Nitrite: Negative   Leuk Esterase: Negative / RBC: 3 /hpf / WBC 1 /HPF   Sq Epi: x / Non Sq Epi: 0 /hpf / Bacteria: Negative        RADIOLOGY & ADDITIONAL TESTS: Reviewed.

## 2022-12-03 NOTE — DIETITIAN INITIAL EVALUATION ADULT - ORAL INTAKE PTA/DIET HISTORY
Per review of transfer records from rehab facility, patient was on DASH/TLC diet, with regular-textured foods and thin liquids there. At rehab, patient was receiving Ensure Plus High Protein supplement once daily and LPS (Liquid Protein Supplement) 30 ml once daily (unknown if taking). NKFA per chart. Per transfer records, patient taking melatonin PRN PTA.

## 2022-12-03 NOTE — PROGRESS NOTE ADULT - ATTENDING COMMENTS
Patient seen and examined. Patient critically ill requiring frequent bedside visits with therapy change. Patient is a 81M with extensive medical history including HTN, HLD, HFrEF, VT s/p AICD, AS (TAVR), CAD (stent), GERD, and pancreatic Ca s/p Whipple now with recurrent disease who has had a nonhealing abdominal wound with significant bleeding when on therapeutic AC.    1. Cardiovascular - shock state requiring vasopressors to maintain MAP > 65.   - check 2D echo if not done recently (has another MRN which will need to be checked)  2. Infectious Disease - continue broad spectrum antibiotics and followup cultures in case of sepsis  - Check nasal MRSA. De-escalate abx if negative.  - Monitor lactate  - stage 2 sacral wound.  - Podiatry evaluation for toes - prior amputation  3. Hem/Onc - patient with known recurrent pancreatic CA - and per Hem/Onc note 11/23 was not felt to be a candidate for palliative chemotherapy given his decreased functional status. Family reportedly hoping to seek second opinion when/if patient able to be discharged  - Venous thromboembolism - patient with new acute PE as well as portal vein thrombus - not on AC due to severe bleeding - will attempt prophylaxis. If unable to tolerate may need to discuss IVC filter.  4. Pulmonary - maintain O2 sat > 90% with supplemental O2 as needed  5. Gastro - aspiration precautions. Modified diet    Prognosis guarded. Patient critically ill with a high risk for clinical deterioration and/or death during this hospitalization.

## 2022-12-03 NOTE — DIETITIAN INITIAL EVALUATION ADULT - COLLABORATION WITH OTHER PROVIDERS
: Yes Consider formal swallowing evaluation with speech pathologist to determine most appropriate diet texture/consistency

## 2022-12-03 NOTE — DIETITIAN INITIAL EVALUATION ADULT - REASON INDICATOR FOR ASSESSMENT
Consult for pressure injury stage 2 or >  Source: EMR, transfer records from rehab facility   Pt noted to be confused and RN providing bedside care at time of visit, unable to complete RD interview at this time

## 2022-12-03 NOTE — DIETITIAN INITIAL EVALUATION ADULT - ORAL NUTRITION SUPPLEMENTS
Add Ensure Plus High Protein 2 daily (thickened to appropriate consistency PRN) to optimize intake and help meet estimated nutrient needs

## 2022-12-03 NOTE — DIETITIAN INITIAL EVALUATION ADULT - CONTINUE CURRENT NUTRITION CARE PLAN
Continue current diet, defer texture/consistency to team/SLP recommendations. If diet able to be advanced and patient with good PO intake, recommend adding Low Sodium restrictions. Adjust diet PRN./yes

## 2022-12-03 NOTE — CONSULT NOTE ADULT - ASSESSMENT
81 w right foot partial first ray resection, closed  - Patient seen and evaluated  - Patient is afebrile, WBC 92, CRP 18.1, ESR (p)  - Right foot  no open lesions, no edema, no interdigital macerations, no open wounds, no acute SOI. Left foot no open wounds, no open lesions, no edema no interdigital macerations, punctate pressure blister noted to the medial border of the left hallux as well as plantar midfoot. no acute SOI  - Bilateral xray (res wet read) : no fx, no gas, no OM (p)  -no culture taken 2/2 to no clinical signs of infection  -foot is no the source of suggestive fever and sepsis.  -rec off loading pressure boots  -Please have patient wear offloading boots while in bed at all times  -no acute pod intervention necessary at this time  - patient is stable from a podiatry standpoint  - Discussed with attending    81 w right foot partial first ray resection, closed  - Patient seen and evaluated  - Patient is afebrile, WBC 92, CRP 18.1, ESR (p)  - Right foot  no open lesions, no edema, no interdigital macerations, no open wounds, no acute SOI. Left foot no open wounds, no open lesions, no edema no interdigital macerations, punctate pressure blister noted to the medial border of the left hallux as well as plantar midfoot. no acute SOI  - Bilateral xray (res wet read) : no fx, no gas, no OM (p)  -no culture taken 2/2 to no clinical signs of infection  -foot is no the source of suggestive fever and sepsis.  -rec off loading pressure boots  -Please have patient wear offloading boots while in bed at all times  -no acute pod intervention necessary at this time just local wound care  - patient is stable from a podiatry standpoint  -Please reconsult if needed  - Discussed with attending    81 w right foot partial first ray resection, closed  - Patient seen and evaluated  - Patient is afebrile, WBC 92, CRP 18.1, ESR (p)  - Right foot  no open lesions, no edema, no interdigital macerations, no open wounds, no acute SOI. Left foot no open wounds, no open lesions, no edema no interdigital macerations, punctate pressure blister noted to the medial border of the left hallux as well as plantar midfoot. no acute SOI  - Bilateral xray (res wet read) : no fx, no gas, no OM (p)  -no culture taken 2/2 to no clinical signs of infection  -foot is not the source of suggestive fever and sepsis.  -rec Z flows off loading pressure boots  -Please have patient wear Z flow offloading boots at all times while in bed.  -no acute pod intervention necessary at this time just local wound care  - patient is stable from a podiatry standpoint  -F/u information can be found in discharge note criteria under follow up.  -Please reconsult if needed  - Discussed with attending    81 w right foot partial first ray resection, closed  - Patient seen and evaluated  - Patient is afebrile, WBC 92, CRP 18.1, ESR pending  - Right foot  no open lesions, no edema, no interdigital macerations, no open wounds, no acute signs of infection. Left foot no open wounds, no open lesions, no edema no interdigital macerations, punctate pressure blister noted to the medial border of the left hallux as well as plantar midfoot. no acute signs of infection  - Bilateral xray (res wet read) : no fx, no gas, no OM pending  -no culture taken secondary to no clinical signs of infection  -foot is not the source of suggestive fever and sepsis.  -rec Z flows off loading pressure boots  -Please have patient wear Z flow offloading boots at all times while in bed.  -no acute pod intervention necessary at this time just local wound care  - patient is stable from a podiatry standpoint  -Follow up information can be found in discharge note criteria under follow up.  -Please reconsult if needed  - Discussed with attending

## 2022-12-03 NOTE — DIETITIAN INITIAL EVALUATION ADULT - REASON FOR ADMISSION
80yo male with PMH of HTN, HLD, HFrEF, VT, s/p AICD, AS s/p TAVR, CAD s/p stents, GERD, hypothyroidism, anxiety, pancreatic ca s/p whipple (10/26/21), chemo, and XRT with non-healing abdominal wound treated with HBOT and STSG 7/2022 s/p repeat STSG wound vac placement on 9/29, PE, and recurrent hospitalizations for bleeding at graft site who presented from [facility] with hypotension and tachycardia likely in setting of septic shock (per team). Admitted 12/2 to MICU for further management.

## 2022-12-03 NOTE — DIETITIAN INITIAL EVALUATION ADULT - PERTINENT LABORATORY DATA
12-03    134<L>  |  104  |  27<H>  ----------------------------<  155<H>  4.3   |  19<L>  |  0.91    Ca    8.2<L>      03 Dec 2022 00:17  Phos  3.7     12-03  Mg     1.9     12-03    TPro  5.8<L>  /  Alb  2.4<L>  /  TBili  1.1  /  DBili  x   /  AST  78<H>  /  ALT  38  /  AlkPhos  122<H>  12-03

## 2022-12-03 NOTE — CONSULT NOTE ADULT - SUBJECTIVE AND OBJECTIVE BOX
Patient is a 81y old  Male who presents with a chief complaint of 82yo male with PMH of HTN, HLD, HFrEF, VT, s/p AICD, AS s/p TAVR, CAD s/p stents, GERD, hypothyroidism, anxiety, pancreatic ca s/p whipple (10/26/21), chemo, and XRT with non-healing abdominal wound treated with HBOT and STSG 7/2022 s/p repeat STSG wound vac placement on 9/29, PE, and recurrent hospitalizations for bleeding at graft site who presented from [facility] with hypotension and tachycardia likely in setting of septic shock (per team). Admitted 12/2 to MICU for further management.      (03 Dec 2022 10:54)      HPI:  80 yo M (Alternate MRN [id number] Name: Cody Gatica) w/ HTN, HLD, HFrEF (EF 30-35%), VT, s/p ACID, AS s/p TAVR, CAD s/p stents, GERD, hypothyroid, anxiety, pancreatic ca s/p whipple 10/26/21, chemo and xrt with non-healing abdominal wound treated w/ HBOT and STSG 7/8/22, s/p repeat STSG wound vac placement on 9/29, PE on Xarelto, and recurrent hospitalizations for bleeding at graft site presents from [facility] with hypotension and tachycardia.    In EMS, IO placed, given 1L bolus. On arrival to ED Tmax 100.9, BP 70s/40s, given additional 500cc bolus, started on levophed & maintenance fluids. HR 80s, RR 10s-20s, O2 sat 100% on RA. CT w/ evidence of typhlitis & colitis, acute PE w/o evidence of RH strain. Given Tylenol, vancomycin/Zosyn. Admitted to MICU for further management. (02 Dec 2022 08:54)      PAST MEDICAL & SURGICAL HISTORY:  Hypertension      Hyperlipidemia      HFrEF (heart failure with reduced ejection fraction)      AICD (automatic cardioverter/defibrillator) present      Aortic stenosis      Pancreatic cancer      Portal vein thrombosis      Pulmonary embolus      Nonhealing surgical wound      H/O Whipple procedure  2021, c/b nonhealing wound      Status post skin graft using Integra wound dressing      S/P TAVR (transcatheter aortic valve replacement)          MEDICATIONS  (STANDING):  chlorhexidine 4% Liquid 1 Application(s) Topical <User Schedule>  heparin   Injectable 5000 Unit(s) SubCutaneous every 8 hours  levothyroxine 25 MICROGram(s) Oral daily  midodrine 10 milliGRAM(s) Oral every 8 hours  norepinephrine Infusion 0.05 MICROgram(s)/kG/Min (7.03 mL/Hr) IV Continuous <Continuous>  pancrelipase  (CREON 24,000 Lipase Units) 3 Capsule(s) Oral three times a day with meals  piperacillin/tazobactam IVPB.. 3.375 Gram(s) IV Intermittent every 8 hours    MEDICATIONS  (PRN):  acetaminophen     Tablet .. 650 milliGRAM(s) Oral every 6 hours PRN Temp greater or equal to 38C (100.4F)  oxyCODONE    IR 5 milliGRAM(s) Oral every 6 hours PRN Severe Pain (7 - 10)      Allergies    No Known Allergies    Intolerances        VITALS:    Vital Signs Last 24 Hrs  T(C): 37.3 (03 Dec 2022 15:00), Max: 38.2 (03 Dec 2022 09:00)  T(F): 99.1 (03 Dec 2022 15:00), Max: 100.8 (03 Dec 2022 09:00)  HR: 91 (03 Dec 2022 16:15) (82 - 104)  BP: 132/57 (03 Dec 2022 16:15) (80/56 - 161/59)  BP(mean): 82 (03 Dec 2022 16:15) (56 - 99)  RR: 23 (03 Dec 2022 16:15) (15 - 36)  SpO2: 99% (03 Dec 2022 16:15) (94% - 100%)    Parameters below as of 02 Dec 2022 20:00  Patient On (Oxygen Delivery Method): room air        LABS:                          7.3    9.33  )-----------( 156      ( 03 Dec 2022 00:15 )             22.9       12-03    134<L>  |  104  |  27<H>  ----------------------------<  155<H>  4.3   |  19<L>  |  0.91    Ca    8.2<L>      03 Dec 2022 00:17  Phos  3.7     12-03  Mg     1.9     12-03    TPro  5.8<L>  /  Alb  2.4<L>  /  TBili  1.1  /  DBili  x   /  AST  78<H>  /  ALT  38  /  AlkPhos  122<H>  12-03      CAPILLARY BLOOD GLUCOSE          PT/INR - ( 03 Dec 2022 00:15 )   PT: 25.1 sec;   INR: 2.17 ratio         PTT - ( 03 Dec 2022 00:15 )  PTT:36.9 sec    LOWER EXTREMITY PHYSICAL EXAM:    Vascular: DP/PT 2/4, B/L, CFT <3 seconds B/L, Temperature gradient warm to cool, B/L.   Neuro: Epicritic sensation dimished to the level of toes, B/L.  Musculoskeletal/Ortho: s/p right foot partial first ray resection (2021)  Skin: right foot  no open lesions, no edema, no interdigital macerations, no open wounds, no acute SOI. Left foot no open wounds, no open lesions, no edema no interdigital macerations, punctate pressure/friction blister noted to the medial border of the left hallux as well as plantar midfoot. no acute SOI    RADIOLOGY & ADDITIONAL STUDIES:     Patient is a 81y old  Male who presents with a chief complaint of 80yo male with PMH of HTN, HLD, HFrEF, VT, s/p AICD, AS s/p TAVR, CAD s/p stents, GERD, hypothyroidism, anxiety, pancreatic ca s/p whipple (10/26/21), chemo, and XRT with non-healing abdominal wound treated with HBOT and STSG 7/2022 s/p repeat STSG wound vac placement on 9/29, PE, and recurrent hospitalizations for bleeding at graft site who presented from [facility] with hypotension and tachycardia likely in setting of septic shock (per team). Admitted 12/2 to MICU for further management.      (03 Dec 2022 10:54)      HPI:  80 yo M (Alternate MRN [id number] Name: Cody Gatica) w/ HTN, HLD, HFrEF (EF 30-35%), VT, s/p ACID, AS s/p TAVR, CAD s/p stents, GERD, hypothyroid, anxiety, pancreatic ca s/p whipple 10/26/21, chemo and xrt with non-healing abdominal wound treated w/ HBOT and STSG 7/8/22, s/p repeat STSG wound vac placement on 9/29, PE on Xarelto, and recurrent hospitalizations for bleeding at graft site presents from [facility] with hypotension and tachycardia.    In EMS, IO placed, given 1L bolus. On arrival to ED Tmax 100.9, BP 70s/40s, given additional 500cc bolus, started on levophed & maintenance fluids. HR 80s, RR 10s-20s, O2 sat 100% on RA. CT w/ evidence of typhlitis & colitis, acute PE w/o evidence of RH strain. Given Tylenol, vancomycin/Zosyn. Admitted to MICU for further management. (02 Dec 2022 08:54)      PAST MEDICAL & SURGICAL HISTORY:  Hypertension      Hyperlipidemia      HFrEF (heart failure with reduced ejection fraction)      AICD (automatic cardioverter/defibrillator) present      Aortic stenosis      Pancreatic cancer      Portal vein thrombosis      Pulmonary embolus      Nonhealing surgical wound      H/O Whipple procedure  2021, c/b nonhealing wound      Status post skin graft using Integra wound dressing      S/P TAVR (transcatheter aortic valve replacement)          MEDICATIONS  (STANDING):  chlorhexidine 4% Liquid 1 Application(s) Topical <User Schedule>  heparin   Injectable 5000 Unit(s) SubCutaneous every 8 hours  levothyroxine 25 MICROGram(s) Oral daily  midodrine 10 milliGRAM(s) Oral every 8 hours  norepinephrine Infusion 0.05 MICROgram(s)/kG/Min (7.03 mL/Hr) IV Continuous <Continuous>  pancrelipase  (CREON 24,000 Lipase Units) 3 Capsule(s) Oral three times a day with meals  piperacillin/tazobactam IVPB.. 3.375 Gram(s) IV Intermittent every 8 hours    MEDICATIONS  (PRN):  acetaminophen     Tablet .. 650 milliGRAM(s) Oral every 6 hours PRN Temp greater or equal to 38C (100.4F)  oxyCODONE    IR 5 milliGRAM(s) Oral every 6 hours PRN Severe Pain (7 - 10)      Allergies    No Known Allergies    Intolerances        VITALS:    Vital Signs Last 24 Hrs  T(C): 37.3 (03 Dec 2022 15:00), Max: 38.2 (03 Dec 2022 09:00)  T(F): 99.1 (03 Dec 2022 15:00), Max: 100.8 (03 Dec 2022 09:00)  HR: 91 (03 Dec 2022 16:15) (82 - 104)  BP: 132/57 (03 Dec 2022 16:15) (80/56 - 161/59)  BP(mean): 82 (03 Dec 2022 16:15) (56 - 99)  RR: 23 (03 Dec 2022 16:15) (15 - 36)  SpO2: 99% (03 Dec 2022 16:15) (94% - 100%)    Parameters below as of 02 Dec 2022 20:00  Patient On (Oxygen Delivery Method): room air        LABS:                          7.3    9.33  )-----------( 156      ( 03 Dec 2022 00:15 )             22.9       12-03    134<L>  |  104  |  27<H>  ----------------------------<  155<H>  4.3   |  19<L>  |  0.91    Ca    8.2<L>      03 Dec 2022 00:17  Phos  3.7     12-03  Mg     1.9     12-03    TPro  5.8<L>  /  Alb  2.4<L>  /  TBili  1.1  /  DBili  x   /  AST  78<H>  /  ALT  38  /  AlkPhos  122<H>  12-03      CAPILLARY BLOOD GLUCOSE          PT/INR - ( 03 Dec 2022 00:15 )   PT: 25.1 sec;   INR: 2.17 ratio         PTT - ( 03 Dec 2022 00:15 )  PTT:36.9 sec    LOWER EXTREMITY PHYSICAL EXAM:    Vascular: DP/PT 2/4, B/L, CFT <3 seconds B/L, Temperature gradient warm to cool, B/L.   Neuro: Epicritic sensation dimished to the level of toes, B/L.  Musculoskeletal/Ortho: s/p right foot partial first ray resection (2021)  Skin: right foot  no open lesions, no edema, no interdigital macerations, no open wounds, no acute signs of infection. Left foot no open wounds, no open lesions, no edema no interdigital macerations, punctate pressure/friction blister noted to the medial border of the left hallux as well as plantar midfoot. no acute signs of infection    RADIOLOGY & ADDITIONAL STUDIES:

## 2022-12-03 NOTE — DIETITIAN INITIAL EVALUATION ADULT - NSFNSNUTRCHEWSWALLOWFT_GEN_A_CORE
Pt currently on Pureed diet - continue to monitor tolerance. PTA, patient on PO diet with regular-textured foods and thin liquids.

## 2022-12-03 NOTE — DIETITIAN INITIAL EVALUATION ADULT - ADD RECOMMEND
- Add multivitamin and vitamin C (if no contraindications) to aid in wound healing  - Monitor PO intake, weight, labs, skin, GI status, diet

## 2022-12-03 NOTE — DIETITIAN INITIAL EVALUATION ADULT - PERTINENT MEDS FT
MEDICATIONS  (STANDING):  chlorhexidine 4% Liquid 1 Application(s) Topical <User Schedule>  levothyroxine 25 MICROGram(s) Oral daily  norepinephrine Infusion 0.05 MICROgram(s)/kG/Min (7.03 mL/Hr) IV Continuous <Continuous>  pancrelipase  (CREON 24,000 Lipase Units) 3 Capsule(s) Oral three times a day with meals  piperacillin/tazobactam IVPB.. 3.375 Gram(s) IV Intermittent every 8 hours  vancomycin  IVPB 1000 milliGRAM(s) IV Intermittent every 24 hours    MEDICATIONS  (PRN):  acetaminophen     Tablet .. 650 milliGRAM(s) Oral every 6 hours PRN Temp greater or equal to 38C (100.4F)  oxyCODONE    IR 5 milliGRAM(s) Oral every 6 hours PRN Severe Pain (7 - 10)

## 2022-12-03 NOTE — PROGRESS NOTE ADULT - ASSESSMENT
82 yo M (Alternate MRN [id number] Name: Cody Gatica) w/ HTN, HLD, HFrEF (EF 30-35%), VT, s/p AICD, AS s/p TAVR, CAD s/p stents, GERD, hypothyroid, anxiety, pancreatic ca s/p whipple 10/26/21, chemo and xrt with non-healing abdominal wound treated w/ HBOT and STSG 7/8/22, s/p repeat STSG wound vac placement on 9/29, PE on Xarelto, and recurrent hospitalizations for bleeding at graft site presents from [facility] with hypotension, tachycardia, fever, likely i/s/o septic shock, admitted to MICU for further management.    #NEURO  //Encephalopathy  - AOx2 on exam, AOx3 at baseline  - Likely i/s/o sepsis  - Neuro checks per ICU protocol    //Neuropathy  - Holding home duloxetine/gabapentin for now while NPO    //Anxiety  - Holding home Xanax    #SKIN  //Nonhealing abdominal wound  - S/p skin grafting  - Recurrent hospitalizations for bleeding from site  - Plastic surgery consulted, appreciate recs  - Wound care: apply hydrogel & aquacel pad every other day    #CARDIOVASCULAR  //Shock state, likely septic  - Management of shock as per below  - On levophed, wean for MAP >65    //HFrEF s/p AICD  - Holding home Entresto, Toprol i/s/o shock state  - Holding home amiodarone for now  - Euvolemic on exam, CTM    //AS s/p TAVR  - Not on AC    //Pulmonary emboli  - Reportedly not on AC outpatient 2/2 recurrent bleeding at nonhealing wound site  - POCUS w/o RV dilation  - Hold AC for now    //HLD  - Reportedly not on statin    #RESPIRATORY  - O2 sat appropriate on RA  - No active issues    #GI/FEN  - NPO while encephalopathic    //Typhlitis, colitis  - Continue with antibiotics as per below  - NPO for now    #RENAL/METABOLIC  - SCr appropriate, CTM  - Jimenez in place  - Holding home Flomax while NPO    #HEME/ONC  //Pancreatic cancer  - S/p whipple, chemotherapy XRT  - Per family with recent recurrent of disease  - No disease modifying therapy while inpatient    #ENDOCRINE  - POCT glucose q6hr while NPO    //Hypothyroidism  - Holding home synthroid while NPO    #ID  //Septic shock, unclear source  - Patient w/ history of MSSA bacteremia from foot wound 9/2021, 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 82 yo M (Alternate MRN [id number] Name: Cody Gatica) w/ HTN, HLD, HFrEF (EF 30-35%), VT, s/p AICD, AS s/p TAVR, CAD s/p stents, GERD, hypothyroid, anxiety, pancreatic ca s/p whipple 10/26/21, chemo and xrt with non-healing abdominal wound treated w/ HBOT and STSG 7/8/22, s/p repeat STSG wound vac placement on 9/29, PE on Xarelto, and recurrent hospitalizations for bleeding at graft site presents from [facility] with hypotension, tachycardia, fever, likely i/s/o septic shock, admitted to MICU for further management.    #NEURO  //Encephalopathy  - AOx2 on exam, AOx3 at baseline  - Likely i/s/o sepsis  - Neuro checks per ICU protocol    //Neuropathy  - Holding home duloxetine/gabapentin for now while NPO  - Restarting home oxycodone q6hr prn pain for diffuse pain    //Anxiety  - Restarting home Xanax 0.25mg qhs i/s/o agitation    #SKIN  //Nonhealing abdominal wound  - S/p skin grafting  - Recurrent hospitalizations for bleeding from site  - Plastic surgery consulted, appreciate recs  - Wound care: apply hydrogel & aquacel pad every other day    #CARDIOVASCULAR  //Shock state, likely septic  - Management of sepsis as per below  - On levophed, wean for MAP >65  - Will start midodrine 10mg q8hr  - 250cc bolus x1, can consider additional boluses prn    //HFrEF s/p AICD  - Holding home Entresto, Toprol i/s/o shock state  - Holding home amiodarone for now  - Euvolemic on exam, CTM    //AS s/p TAVR  - Not on AC given bleed from wound site    //Pulmonary emboli  - Reportedly not on AC outpatient 2/2 recurrent bleeding at nonhealing wound site  - POCUS w/o RV dilation  - Prophylactic HSQ    #RESPIRATORY  - O2 sat appropriate on RA  - No active issues    #GI/FEN  - Pureed diet  - Continue home Creon    //Typhlitis, colitis  - Continue with antibiotics as per below    #RENAL/METABOLIC  - SCr appropriate, CTM  - Jimenez in place  - Holding home Flomax while NPO    #HEME/ONC  //Pancreatic cancer  - S/p whipple, chemotherapy XRT  - Localized recurrent disease identified in 11/22  - No disease modifying therapy while inpatient  - Family stating that prior plan was to seek further chemo at [facility]    //Elevated INR  - I/s/o poor po  - CTM     //DVT ppx  - Will start HSQ prophylaxis    //Anemia  - Chronic in nature  - Transfuse hgb <7    #ENDOCRINE  - No active issues    //Hypothyroidism  - Continue home Synthroid    #ID  //Septic shock, unclear source  - Patient w/ history of MSSA bacteremia from foot wound 9/2021, 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  - S/p toe amputation by podiatry at that time  - Also with history of VRE bacteremia of unclear source  - CTAP w/ typhlitis, colitis  - Known sacral ulcer & abdominal wound, both appear clean  - Urinalysis WNL  - S/p Vanc/Zosyn in ED  - Continue Vanc/Zosyn (12/2-)  - Levophed as above  - Urine cultures NGTD  - F/u blood culture (12/2), MRSA swab (12/2)  - Repeat blood culture today (12/3)  - Send procal, ESR, CRP  - Podiatry consult  - Sputum culture    #ETHICS  - Spoke with daughter Kristi 12/2 who stated that she believe patient would want to be DNR/DNI though she is not the decision maker. Updated daughter Stacie later in the day. We discussed his overall poor prognosis with multiple hospitalizations due to multiple infections and complications from his cancer. She discussed their initial plan to take him to [facility] for chemo but also expressed that she would mostly just want him to be able to come home, potentially with hospice level care. At this point, she states that she would like to treat this aggressively with aim of getting him out of the hospital. She states that he previously stated that he would want "everything done", so she would want him to be FULL CODE at this time.

## 2022-12-03 NOTE — DIETITIAN INITIAL EVALUATION ADULT - OTHER INFO
Weight history per [facility]: 94.3 kg (9/29/2022), 90.7 kg (10/19/2022), 86 kg (11/4/2022). Dosing weight 89.5 kg suggests weight gain since 11/4, however ?accuracy. Overall with possible ~5% weight loss x ~2 months. Pt also noted with edema, likely masking lower weight. Of note, per transfer records, patient weight at rehab 71.9 kg, suggesting weight loss. Will continue to monitor and trend weights as able.

## 2022-12-04 NOTE — DISCHARGE NOTE PROVIDER - NSDCFUADDAPPT_GEN_ALL_CORE_FT
Podiatry Discharge Instructions:  Follow up: Please follow up with Dr. Barrios within 1 week of discharge from the hospital, please call [phone] for appointment and discuss that you recently were seen in the hospital.  Wound Care: Please leave your dressing clean dry intact until your follow up appointment  Weight bearing: Please weight bear as tolerated in a surgical shoe.  Antibiotics: Please continue as instructed.

## 2022-12-04 NOTE — PROGRESS NOTE ADULT - ATTENDING COMMENTS
Patient seen and examined. Patient critically ill requiring frequent bedside visits with therapy change. Patient is a 81M with extensive medical history including HTN, HLD, HFrEF, VT s/p AICD, AS (TAVR), CAD (stent), GERD, and pancreatic Ca s/p Whipple now with recurrent disease who has had a nonhealing abdominal wound with significant bleeding when on therapeutic AC.    1. Cardiovascular - shock state requiring vasopressors to maintain MAP > 65. Midodrine 10mg Q8 to assist with weaning off vasopressors  - check 2D echo given bacteremia to evaluate for endocarditis  2. Infectious Disease - continue broad spectrum antibiotics and followup repeat cultures  - Initial culture positive with Strep and Klebsiella.  - PCT 1.23  - stage 2 sacral wound.  - CT abdomen/pelvis with evidence of typhlitis  - Small ascites noted on CT - no safe pocket for thoracentesis.  3. Hem/Onc - patient with known recurrent pancreatic CA - and per Hem/Onc note 11/23 was not felt to be a candidate for palliative chemotherapy given his decreased functional status. Family reportedly hoping to seek second opinion when/if patient able to be discharged  - Venous thromboembolism - patient with new acute PE as well as portal vein thrombus - not on AC due to severe bleeding - will continue prophylaxis. If unable to tolerate may need to discuss IVC filter.  4. Pulmonary - maintain O2 sat > 90% with supplemental O2 as needed  5. Gastro - aspiration precautions. Modified diet    Prognosis guarded. Patient critically ill with a high risk for clinical deterioration and/or death during this hospitalization. Family came to bedside in afternoon after rounds and MICU Fellow, Dr. LeJeune, spoke with patient's daughter to provide clinical update.

## 2022-12-04 NOTE — DISCHARGE NOTE PROVIDER - NSDCMRMEDTOKEN_GEN_ALL_CORE_FT
acetaminophen 325 mg oral tablet: 2 tab(s) orally every 6 hours, As Needed  ALPRAZolam 0.25 mg oral tablet: 1 tab(s) orally once a day (at bedtime)  amiodarone 200 mg oral tablet: 1 tab(s) orally once a day  DULoxetine 60 mg oral delayed release capsule: 1 cap(s) orally once a day (at bedtime)  Entresto 49 mg-51 mg oral tablet: 1 tab(s) orally 2 times a day  gabapentin 100 mg oral capsule: 1 cap(s) orally 3 times a day  levothyroxine 25 mcg (0.025 mg) oral tablet: 1 tab(s) orally once a day  Melatonin 3 mg oral tablet: 1 tab(s) orally once a day (at bedtime), As Needed  oxyCODONE 5 mg oral tablet: 1 tab(s) orally every 6 hours, As Needed  pancrelipase 24,000 units-76,000 units-120,000 units oral delayed release capsule: 3 cap(s) orally 3 times a day  pantoprazole 40 mg oral delayed release tablet: 1 tab(s) orally once a day  simethicone 80 mg oral tablet: 1 tab(s) orally 3 times a day, As Needed  tamsulosin 0.4 mg oral capsule: 1 cap(s) orally once a day (at bedtime)  Toprol-XL 25 mg oral tablet, extended release: 1 tab(s) orally once a day

## 2022-12-04 NOTE — PROGRESS NOTE ADULT - SUBJECTIVE AND OBJECTIVE BOX
Patient is a 81y old  Male who presents with a chief complaint of Hypotension (04 Dec 2022 10:26)     INTERVAL HPI/OVERNIGHT EVENTS:  - No acute events overnight    SUBJECTIVE  - Patient seen and evaluated at bedside  - Appears very drowsy, said a greeting, but and not responding to questions    MEDICATIONS  (STANDING):  chlorhexidine 4% Liquid 1 Application(s) Topical <User Schedule>  heparin   Injectable 5000 Unit(s) SubCutaneous every 8 hours  levothyroxine 25 MICROGram(s) Oral daily  midodrine 10 milliGRAM(s) Oral every 8 hours  norepinephrine Infusion 0.05 MICROgram(s)/kG/Min (7.03 mL/Hr) IV Continuous <Continuous>  pancrelipase  (CREON 24,000 Lipase Units) 3 Capsule(s) Oral three times a day with meals  piperacillin/tazobactam IVPB.. 3.375 Gram(s) IV Intermittent every 8 hours    MEDICATIONS  (PRN):  acetaminophen     Tablet .. 650 milliGRAM(s) Oral every 6 hours PRN Temp greater or equal to 38C (100.4F)  oxyCODONE    IR 5 milliGRAM(s) Oral every 6 hours PRN Severe Pain (7 - 10)    REVIEW OF SYSTEMS: Unable to obtain given patient very drowsy and not responding despite prompting    VITAL SIGNS:  T(F): 98.2 (12-04-22 @ 12:00), Max: 99.5 (12-03-22 @ 13:00)  HR: 87 (12-04-22 @ 12:00) (86 - 107)  BP: 118/56 (12-04-22 @ 12:00) (87/51 - 146/65)  RR: 17 (12-04-22 @ 12:00) (10 - 38)  SpO2: 100% (12-04-22 @ 12:00) (90% - 100%)    PHYSICAL EXAM: LIMITED   General: NAD   Chest: Clear to auscultation bilaterally; no rales, rhonchi, or wheezing  Heart: Regular rate and rhythm; S1 and S2 intact; no murmurs, rubs, or gallops  Abd: Soft, nontender, nondistended  Nervous System: AAOX3  Psych: Appropriate affect  Ext: no peripheral LE edema bilaterally    LABS:                        7.7    8.98  )-----------( 145      ( 04 Dec 2022 01:38 )             23.7     04 Dec 2022 01:37    135    |  104    |  28     ----------------------------<  127    4.1     |  21     |  0.87     Ca    8.2        04 Dec 2022 01:37  Phos  2.8       04 Dec 2022 01:37  Mg     1.7       04 Dec 2022 01:37    TPro  5.7    /  Alb  2.3    /  TBili  1.5    /  DBili  x      /  AST  67     /  ALT  40     /  AlkPhos  149    04 Dec 2022 01:37  PT/INR - ( 04 Dec 2022 03:08 )   PT: 24.5 sec;   INR: 2.10 ratio       PTT - ( 04 Dec 2022 03:08 )  PTT:42.3 sec  CAPILLARY BLOOD GLUCOSE      BLOOD CULTURE  12-02 @ 04:08   No growth  --  --  12-02 @ 03:30   Growth in anaerobic bottle: Gram positive cocci in pairs  --  --  12-02 @ 03:28   Growth in anaerobic bottle: Gram positive cocci in pairs  ***Blood Panel PCR results on this specimen are available  approximately 3 hours after the Gram stain result.***  Gram stain, PCR, and/or culture results may not always  correspond due to difference in methodologies.  ************************************************************  This PCR assay was performed by multiplex PCR. This  Assay tests for 66 bacterial and resistance gene targets.  Please refer to the [facility] Labs test directory  at [email or web address] for details.  --  Blood Culture PCR    RADIOLOGY & ADDITIONAL TESTS:    Imaging Personally Reviewed:  [X ] YES     Consultant(s) Notes Reviewed:  Yes    Care Discussed with Consultants/Other Providers: Yes Patient is a 81y old  Male who presents with a chief complaint of Hypotension (04 Dec 2022 10:26)     INTERVAL HPI/OVERNIGHT EVENTS:  - No acute events overnight    SUBJECTIVE  - Patient seen and evaluated at bedside  - Appears very drowsy, said a greeting, but and not responding to questions    MEDICATIONS  (STANDING):  chlorhexidine 4% Liquid 1 Application(s) Topical <User Schedule>  heparin   Injectable 5000 Unit(s) SubCutaneous every 8 hours  levothyroxine 25 MICROGram(s) Oral daily  midodrine 10 milliGRAM(s) Oral every 8 hours  norepinephrine Infusion 0.05 MICROgram(s)/kG/Min (7.03 mL/Hr) IV Continuous <Continuous>  pancrelipase  (CREON 24,000 Lipase Units) 3 Capsule(s) Oral three times a day with meals  piperacillin/tazobactam IVPB.. 3.375 Gram(s) IV Intermittent every 8 hours    MEDICATIONS  (PRN):  acetaminophen     Tablet .. 650 milliGRAM(s) Oral every 6 hours PRN Temp greater or equal to 38C (100.4F)  oxyCODONE    IR 5 milliGRAM(s) Oral every 6 hours PRN Severe Pain (7 - 10)    REVIEW OF SYSTEMS: Unable to obtain given patient very drowsy and not responding despite prompting    VITAL SIGNS:  T(F): 98.2 (12-04-22 @ 12:00), Max: 99.5 (12-03-22 @ 13:00)  HR: 87 (12-04-22 @ 12:00) (86 - 107)  BP: 118/56 (12-04-22 @ 12:00) (87/51 - 146/65)  RR: 17 (12-04-22 @ 12:00) (10 - 38)  SpO2: 100% (12-04-22 @ 12:00) (90% - 100%)    PHYSICAL EXAM: LIMITED   General: NAD   Chest: Clear to auscultation bilaterally; no rales, rhonchi, or wheezing  Heart: Regular rate and rhythm; S1 and S2 intact; no murmurs, rubs, or gallops  Abd: Soft, nontender, nondistended  Nervous System: Can be aroused but very drowsy   Psych: Appropriate affect  Ext: no peripheral LE edema bilaterally    LABS:                        7.7    8.98  )-----------( 145      ( 04 Dec 2022 01:38 )             23.7     04 Dec 2022 01:37    135    |  104    |  28     ----------------------------<  127    4.1     |  21     |  0.87     Ca    8.2        04 Dec 2022 01:37  Phos  2.8       04 Dec 2022 01:37  Mg     1.7       04 Dec 2022 01:37    TPro  5.7    /  Alb  2.3    /  TBili  1.5    /  DBili  x      /  AST  67     /  ALT  40     /  AlkPhos  149    04 Dec 2022 01:37  PT/INR - ( 04 Dec 2022 03:08 )   PT: 24.5 sec;   INR: 2.10 ratio       PTT - ( 04 Dec 2022 03:08 )  PTT:42.3 sec  CAPILLARY BLOOD GLUCOSE      BLOOD CULTURE  12-02 @ 04:08   No growth  --  --  12-02 @ 03:30   Growth in anaerobic bottle: Gram positive cocci in pairs  --  --  12-02 @ 03:28   Growth in anaerobic bottle: Gram positive cocci in pairs  ***Blood Panel PCR results on this specimen are available  approximately 3 hours after the Gram stain result.***  Gram stain, PCR, and/or culture results may not always  correspond due to difference in methodologies.  ************************************************************  This PCR assay was performed by multiplex PCR. This  Assay tests for 66 bacterial and resistance gene targets.  Please refer to the [facility] Labs test directory  at [email or web address] for details.  --  Blood Culture PCR    RADIOLOGY & ADDITIONAL TESTS:    Imaging Personally Reviewed:  [X ] YES     Consultant(s) Notes Reviewed:  Yes    Care Discussed with Consultants/Other Providers: Yes

## 2022-12-05 NOTE — CHART NOTE - NSCHARTNOTEFT_GEN_A_CORE
MAR Accept Note  Transfer to: Medicine  Accepting Attending Physician:  Dr. Alfred  Assigned Room:  517W    Patient seen and examined.   Labs and data reviewed.   No findings precluding transfer of service.       HPI/MICU COURSE:   Please refer to MICU transfer note for full details. Briefly, this is a 82 yo M (Alternate MRN [id number] Name: Cody Gatica) w/ HTN, HLD, HFrEF (EF 30-35%), VT, s/p AICD, AS s/p TAVR, CAD s/p stents, GERD, hypothyroid, anxiety, pancreatic ca s/p whipple 10/26/21, chemo and xrt with non-healing abdominal wound treated w/ HBOT and STSG 7/8/22, s/p repeat STSG wound vac placement on 9/29, PE on Xarelto, and recurrent hospitalizations for bleeding at graft site presents from [facility] with hypotension, tachycardia, fever, likely i/s/o septic shock, admitted to MICU for further management.  Blood cultures collected on 12/2 growing Strep anginosus and Klebsiella treated with zosyn; repeat blood cultures on 12/3 NGTD. MRSA negative s/p vanco x1. Initially required pressor support with levophed, now on midodrine 10 q8. Patient also incidentally found to have acute pulmonary emboli in the left upper lobe and possibly in the lingula; bedside POCUS without evidence of RV strain. AC initially held after discussion due to chronically oozing abdominal wound. AC also held outpatient for history for portal vein thrombosis. Transitioned from prophylactic Heparin to Eliquis 5mg BID on 12/5. Concurrently found to have typhlitis and on treatment with zosyn. Ongoing GOC discussions with family with hopes of discharge home for future care planning.       FOR FOLLOW-UP:  [ ] f/u TTE to evaluate for endocarditis   [ ] attempt to wean off midodrine  [ ] f/u final blood culture results from 12/3  [ ] telemetry monitoring for known PE  [ ] plastic surgery and wound care recommendations for bleeding abdominal wound    Josue Miller MD  Internal Medicine PGY-3 MAR Accept Note  Transfer to: Medicine - Telemetry  Accepting Attending Physician:  Dr. Alfred  Assigned Room:  517W    Patient seen and examined.   Labs and data reviewed.   No findings precluding transfer of service.       HPI/MICU COURSE:   Please refer to MICU transfer note for full details. Briefly, this is a 80 yo M (Alternate MRN [id number] Name: Cody Gatica) w/ HTN, HLD, HFrEF (EF 30-35%), VT, s/p AICD, AS s/p TAVR, CAD s/p stents, GERD, hypothyroid, anxiety, pancreatic ca s/p whipple 10/26/21, chemo and xrt with non-healing abdominal wound treated w/ HBOT and STSG 7/8/22, s/p repeat STSG wound vac placement on 9/29, PE on Xarelto, and recurrent hospitalizations for bleeding at graft site presents from [facility] with hypotension, tachycardia, fever, likely i/s/o septic shock, admitted to MICU for further management. Blood cultures collected on 12/2 growing Strep anginosus and Klebsiella treated with zosyn; repeat blood cultures on 12/3 NGTD. MRSA negative s/p vanco x1. Initially required pressor support with levophed, now on midodrine 10 q8. Patient also incidentally found to have acute pulmonary emboli in the left upper lobe and possibly in the lingula; bedside POCUS without evidence of RV strain. AC initially held after discussion due to chronically oozing abdominal wound. AC also held outpatient for history for portal vein thrombosis. Transitioned from prophylactic Heparin to Eliquis 5mg BID on 12/5. Concurrently found to have typhlitis and on treatment with zosyn. Ongoing [facility] discussions with family with hopes of discharge home for future care planning.       FOR FOLLOW-UP:  [ ] f/u TTE to evaluate for endocarditis   [ ] wean off midodrine as tolerated  [ ] f/u final blood culture results from 12/3  [ ] telemetry monitoring for known PE  [ ] plastic surgery and wound care recommendations for bleeding abdominal wound    Josue Miller MD  Internal Medicine PGY-3

## 2022-12-05 NOTE — PROGRESS NOTE ADULT - ATTENDING COMMENTS
1. Severe sepsis from strep and klebsiella bacteremia. Off IV pressors and on midodrine. Continue IV zosyn. Repeat blood cx.  2. Cardiac H/O AICD removed and reimplanted. Will anusha EP for evaluation to extract AICD.  3.Portal vein thrombosis and new PE in setting of Pancreatic cancer. Mild bleeding of abd. wound today. Will restart Eliquis  if EP does not want to extract AICD.  4. DVT prophylaxis. SQ heparin if not placed on full AC.  5.CT ABD with colitis typhlitis. Currently on Zosyn.  6.Delirium : Improving. Not quite at baseline yet.  7. Anxiety disorder. Continue home Xanax  8. Wounds. Abd wound. From previous surgery. To be evaluated by plastic surgery                   Sacral decubitus  ulcer: Continue local wound care.  9. GOC.  Full code. Daughter looking for health care proxy papers.

## 2022-12-05 NOTE — PROGRESS NOTE ADULT - PROBLEM SELECTOR PLAN 1
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  - CTAP w/ typhlitis, colitis c/w abx  - Known sacral ulcer & abdominal wound, both appear clean  - Urinalysis WNL | Urine cultures NGTD | F/u blood culture (12/2) grew streptococcus, MRSA swab (12/2)  - Continue Zosyn (12/2-)  - f/u TTE to r/o endocarditis 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 to r/o endocarditis; TTE performed on 12/4 grossly normal EF 50%  - may need ID consult for duration of abx and recommendations regarding CORBIN

## 2022-12-05 NOTE — PROGRESS NOTE ADULT - ASSESSMENT
82 yo M (Alternate MRN [id number] Name: Cody Gatica) w/ HTN, HLD, HFrEF (EF 30-35%), VT, s/p AICD, AS s/p TAVR, CAD s/p stents, GERD, hypothyroid, anxiety, pancreatic ca s/p whipple 10/26/21, chemo and xrt with non-healing abdominal wound treated w/ HBOT and STSG 7/8/22, s/p repeat STSG wound vac placement on 9/29, PE on Xarelto, and recurrent hospitalizations for bleeding at graft site presents from [facility] with hypotension, tachycardia, fever, likely i/s/o septic shock, admitted to MICU for further management.    #NEURO  //Encephalopathy  - Arousable but drowsy on exam, AOx3 at baseline  - Likely i/s/o sepsis  - Neuro checks per ICU protocol    //Neuropathy  - Holding home duloxetine/gabapentin for now while NPO  - On home oxycodone q6hr prn pain for diffuse pain    //Anxiety  - Home Xanax 0.25mg qhs i/s/o agitation; consider resuming if needed for agitation    #SKIN  //Nonhealing abdominal wound  - S/p skin grafting  - Recurrent hospitalizations for bleeding from site  - Plastic surgery consulted, appreciate recs  - Wound care: apply hydrogel & Aquacel pad every other day    #CARDIOVASCULAR  //Shock state, likely septic; improving   - Management of sepsis as per below  - off levophed  - C/w midodrine 10mg q8hr    //HFrEF s/p AICD  - Holding home Entresto, Toprol i/s/o shock state  - Holding home amiodarone for now  - Euvolemic on exam, CTM  - Follow up TTE    //AS s/p TAVR  - Not on AC given bleed from wound site    //Pulmonary emboli  - Reportedly not on AC outpatient 2/2 recurrent bleeding at nonhealing wound site  - POCUS w/o RV dilation  - Prophylactic HSQ    #RESPIRATORY  - O2 sat appropriate on RA  - No active issues    #GI/FEN  - Pureed diet  - Continue home Creon    //Typhlitis, colitis  - Continue with antibiotics as per below    #RENAL/METABOLIC  - SCr appropriate, CTM  - Jimenez in place  - Holding home Flomax while NPO    #HEME/ONC  //Pancreatic cancer  - S/p whipple, chemotherapy XRT  - Localized recurrent disease identified in 11/22  - No disease modifying therapy while inpatient  - Family stating that prior plan was to seek further chemo at [facility] [place]    //Elevated INR  - I/s/o poor po  - CTM     //DVT ppx  - On HSQ prophylaxis  - Continue to monitor for bleeding    //Anemia  - Chronic in nature  - Transfuse hgb <7    #ENDOCRINE  - No active issues    //Hypothyroidism  - Continue home Synthroid    #ID  //Septic shock most likely from bacteremia (BCx grew streptococcus)  - Patient w/ history of MSSA bacteremia from foot wound 9/2021, 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  - S/p toe amputation by podiatry at that time  - Also with history of VRE bacteremia of unclear source  - CTAP w/ typhlitis, colitis  - Known sacral ulcer & abdominal wound, both appear clean  - Urinalysis WNL  - S/p Vanc/Zosyn in ED  - Continue Zosyn (12/2-)  - Levophed dc-ed; c/w midodrine  - Urine cultures NGTD  - F/u blood culture (12/2) grew streptococcus, MRSA swab (12/2)  - Repeat blood culture today (12/3)  - Send procal, ESR, CRP  - Podiatry consult  - Sputum culture    #ETHICS  - Spoke with daughter Kristi 12/2 who stated that she believe patient would want to be DNR/DNI though she is not the decision maker. Updated daughter Stacie later in the day. We discussed his overall poor prognosis with multiple hospitalizations due to multiple infections and complications from his cancer. She discussed their initial plan to take him to [facility] for chemo but also expressed that she would mostly just want him to be able to come home, potentially with hospice level care. At this point, she states that she would like to treat this aggressively with aim of getting him out of the hospital. She states that he previously stated that he would want "everything done", so she would want him to be FULL CODE at this time. 80 yo M (Alternate MRN [id number] Name: Cody Gatica) w/ HTN, HLD, HFrEF (EF 30-35%), VT, s/p AICD, AS s/p TAVR, CAD s/p stents, GERD, hypothyroid, anxiety, pancreatic ca s/p whipple 10/26/21, chemo and xrt with non-healing abdominal wound treated w/ HBOT and STSG 7/8/22, s/p repeat STSG wound vac placement on 9/29, PE on Xarelto, and recurrent hospitalizations for bleeding at graft site presents from [facility] with hypotension, tachycardia, fever, likely i/s/o septic shock, admitted to MICU for further management.    #NEURO  //Encephalopathy  - Improved, occasional confusion but overall much improved mental status, AOx3 at baseline  - Likely i/s/o sepsis  - Neuro checks per ICU protocol    //Neuropathy  - Holding home duloxetine/gabapentin for now while NPO  - On home oxycodone q6hr prn pain for diffuse pain    //Anxiety  - Home Xanax 0.25mg qhs i/s/o agitation; consider resuming if needed for agitation    #SKIN  //Nonhealing abdominal wound  - S/p skin grafting  - Recurrent hospitalizations for bleeding from site  - Plastic surgery consulted, appreciate recs  - Wound care: apply hydrogel & Aquacel pad every other day    #CARDIOVASCULAR  //Shock state, likely septic; improving   - Management of sepsis as per below  - off levophed  - C/w midodrine 10mg q8hr    //HFrEF s/p AICD  - Holding home Entresto, Toprol i/s/o shock state  - Holding home amiodarone for now  - Euvolemic on exam, CTM  - Follow up TTE    //AS s/p TAVR  - Not on AC given bleed from wound site    //Pulmonary emboli  - Reportedly not on AC outpatient 2/2 recurrent bleeding at nonhealing wound site, no bleeding since hospitalized here, will start on Eliquis 5 mg BID and monitor for any signs of bleeding  - POCUS w/o RV dilation      #RESPIRATORY  - O2 sat appropriate on RA  - No active issues    #GI/FEN  - Pureed diet  - Continue home Creon    //Typhlitis, colitis  - Continue with antibiotics as per below    #RENAL/METABOLIC  - SCr appropriate, CTM  - Jimenez in place  - Holding home Flomax while NPO    #HEME/ONC  //Pancreatic cancer  - S/p whipple, chemotherapy XRT  - Localized recurrent disease identified in 11/22  - No disease modifying therapy while inpatient  - Family stating that [place] [facility] [place]    //Elevated INR  - I/s/o poor po  - CTM     //DVT ppx  - Will transition to Eliquis 5 mg BID   - Continue to monitor for bleeding    //Anemia  - Chronic in nature  - Transfuse hgb <7    #ENDOCRINE  - No active issues    //Hypothyroidism  - Continue home Synthroid    #ID  //Septic shock most likely from bacteremia (BCx grew streptococcus)  - Patient w/ history of MSSA bacteremia from foot wound 9/2021, 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  - S/p toe amputation by podiatry at that time  - Also with history of VRE bacteremia of unclear source  - CTAP w/ typhlitis, colitis  - Known sacral ulcer & abdominal wound, both appear clean  - Urinalysis WNL  - S/p Vanc/Zosyn in ED  - Continue Zosyn (12/2-)  - Levophed dc-ed; c/w midodrine  - Urine cultures NGTD  - F/u blood culture (12/2) grew streptococcus, MRSA swab (12/2)  - Repeat blood culture today (12/3)  - Send procal, ESR, CRP  - Podiatry consult  - Sputum culture  - D/C david     #ETHICS  - Spoke with daughter Kristi 12/2 who stated that she believe patient would want to be DNR/DNI though she is not the decision maker. Updated daughter Stacie later in the day. We discussed his overall poor prognosis with multiple hospitalizations due to multiple infections and complications from his cancer. She discussed their [place] [facility] but also expressed that she would mostly just want him to be able to come home, potentially with hospice level care. At this point, she states that she would like to treat this aggressively with aim of getting him out of the hospital. She states that he previously stated that he would want "everything done", so she would want him to be FULL CODE at this time. 82 yo M (Alternate MRN [id number] Name: Cody Gatica) w/ HTN, HLD, HFrEF (EF 30-35%), VT, s/p AICD, AS s/p TAVR, CAD s/p stents, GERD, hypothyroid, anxiety, pancreatic ca s/p whipple 10/26/21, chemo and xrt with non-healing abdominal wound treated w/ HBOT and STSG 7/8/22, s/p repeat STSG wound vac placement on 9/29, PE on Xarelto, and recurrent hospitalizations for bleeding at graft site presents from [facility] with hypotension, tachycardia, fever, likely i/s/o septic shock, admitted to MICU for further management.    #NEURO  //Encephalopathy  - Improved, occasional confusion but overall much improved mental status, AOx3 at baseline  - Likely i/s/o sepsis  - Neuro checks per ICU protocol    //Neuropathy  - Holding home duloxetine/gabapentin for now while NPO  - On home oxycodone q6hr prn pain for diffuse pain    //Anxiety  - Home Xanax 0.25mg qhs i/s/o agitation; consider resuming if needed for agitation    #SKIN  //Nonhealing abdominal wound  - S/p skin grafting  - Recurrent hospitalizations for bleeding from site  - Plastic surgery consulted, appreciate recs  - Wound care: apply hydrogel & Aquacel pad every other day    #CARDIOVASCULAR  //Shock state, likely septic; improving   - Management of sepsis as per below  - off levophed  - C/w midodrine 10mg q8hr    //HFrEF s/p AICD  - Holding home Entresto, Toprol i/s/o shock state  - Holding home amiodarone for now  - Euvolemic on exam, CTM  - Follow up TTE  - Spoke with EP: no need for extraction of subcutaneous AICD    //AS s/p TAVR  - Not on AC given bleed from wound site    //Pulmonary emboli  - Reportedly not on AC outpatient 2/2 recurrent bleeding at nonhealing wound site, no bleeding since hospitalized here, will start on Eliquis 5 mg BID and monitor for any signs of bleeding  - POCUS w/o RV dilation      #RESPIRATORY  - O2 sat appropriate on RA  - No active issues    #GI/FEN  - Pureed diet  - Continue home Creon    //Typhlitis, colitis  - Continue with antibiotics as per below    #RENAL/METABOLIC  - SCr appropriate, CTM  - Hernandez in place  - Holding home Flomax while NPO    #HEME/ONC  //Pancreatic cancer  - S/p whipple, chemotherapy XRT  - Localized recurrent disease identified in 11/22  - No disease modifying therapy while inpatient  - Family stating that prior plan was to seek further chemo at [facility] [place]    //Elevated INR  - I/s/o poor po  - CTM     //DVT ppx  - Will transition to Eliquis 5 mg BID   - Continue to monitor for bleeding    //Anemia  - Chronic in nature  - Transfuse hgb <7    #ENDOCRINE  - No active issues    //Hypothyroidism  - Continue home Synthroid    #ID  //Septic shock most likely from bacteremia (BCx grew streptococcus)  - Patient w/ history of MSSA bacteremia from foot wound 9/2021, 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  - S/p toe amputation by podiatry at that time  - Also with history of VRE bacteremia of unclear source  - CTAP w/ typhlitis, colitis  - Known sacral ulcer & abdominal wound, both appear clean  - Urinalysis WNL  - S/p Vanc/Zosyn in ED  - Continue Zosyn (12/2-)  - Levophed dc-ed; c/w midodrine  - Urine cultures NGTD  - F/u blood culture (12/2) grew streptococcus, MRSA swab (12/2)  - Repeat blood culture today (12/3)  - Send procal, ESR, CRP  - Podiatry consult  - Sputum culture  - D/C hernandez     #ETHICS  - Spoke with daughter Kristi 12/2 who stated that she believe patient would want to be DNR/DNI though she is not the decision maker. Updated daughter Stacie later in the day. We discussed his overall poor prognosis with multiple hospitalizations due to multiple infections and complications from his cancer. She discussed their initial plan to take him to [facility] for chemo but also expressed that she would mostly just want him to be able to come home, potentially with hospice level care. At this point, she states that she would like to treat this aggressively with aim of getting him out of the hospital. She states that he previously stated that he would want "everything done", so she would want him to be FULL CODE at this time.

## 2022-12-05 NOTE — CONSULT NOTE ADULT - SUBJECTIVE AND OBJECTIVE BOX
Wound Surgery Consult Note:    HPI:  80 yo M (Alternate MRN [id number] Name: Cody Gatica) w/ HTN, HLD, HFrEF (EF 30-35%), VT, s/p ACID, AS s/p TAVR, CAD s/p stents, GERD, hypothyroid, anxiety, pancreatic ca s/p whipple 10/26/21, chemo and xrt with non-healing abdominal wound treated w/ HBOT and STSG 7/8/22, s/p repeat STSG wound vac placement on 9/29, PE on Xarelto, and recurrent hospitalizations for bleeding at graft site presents from [facility] with hypotension and tachycardia.    In EMS, IO placed, given 1L bolus. On arrival to ED Tmax 100.9, BP 70s/40s, given additional 500cc bolus, started on levophed & maintenance fluids. HR 80s, RR 10s-20s, O2 sat 100% on RA. CT w/ evidence of typhlitis & colitis, acute PE w/o evidence of RH strain. Given Tylenol, vancomycin/Zosyn. Admitted to MICU for further management. (02 Dec 2022 08:54)    Request for wound care consult for sacral/bilateral buttocks skin damage received from nursing. Mr. Gatica was encountered on an alternating air with low air loss surface resting comfortably. He was seen with his clinical nurse. He stated that he does have some discomfort associated with the skin damage on his sacrum and he knows he has a wound there. He is incontinent of stool and has a urinary catheter managing urinary incontinence at this time. He was educated on offloading and frequent turning and repositioning. He indicated understanding.    PAST MEDICAL & SURGICAL HISTORY:  Hypertension  Hyperlipidemia  HFrEF (heart failure with reduced ejection fraction)  AICD (automatic cardioverter/defibrillator) present  Aortic stenosis  Pancreatic cancer  Portal vein thrombosis  Pulmonary embolus  Nonhealing surgical wound  H/O Whipple procedure, 2021, c/b nonhealing wound  Status post skin graft using Integra wound dressing  S/P TAVR (transcatheter aortic valve replacement)    REVIEW OF SYSTEMS:    Constitutional:     [x ] negative [ ] fevers [ ] chills [ ] weight loss [ ] weight gain  HEENT:                  [ x] negative [ ] dry eyes [ ] eye irritation [ ] postnasal drip [ ] nasal congestion   CV:                         [x ] negative  [ ] chest pain [ ] orthopnea [ ] palpitations [ ] tachycardia  Resp:                     [x ] negative [ x] cough [x ] shortness of breath [x ] dyspnea [ ] wheezing [ ] sputum [ ] hemoptysis  GI:                          [ ] negative [ ] nausea [ ] vomiting [ ] diarrhea [ ] constipation [ ] abd pain [ ] dysphagia [x] incontinent of bowel  :                        [ ] negative [ ] dysuria [ ] nocturia [ ] hematuria [ ] increased urinary frequency [ x] incontinent of urine  Musculoskeletal:     [x ] negative [ ] back pain [ ] myalgias [ ] arthralgias [ ] fracture [ ] ambulatory  Skin:                       [ ] negative [ ] rash [ ] itch [x ] wound [ ] skin discoloration  Neurological:        [x ] negative [ ] headache [ ] dizziness [ ] syncope [ ] weakness [ ] numbness  Psychiatric:           [x ] negative [ ] anxiety [ ] depression  Endocrine:            [ x] negative [ ] diabetes [ ] thyroid problem  Heme/Lymph:      [x ] negative [ ] anemia [ ] bleeding problem  Allergic/Immune: [x ] negative [ ] itchy eyes [ ] nasal discharge [ ] hives [ ] angioedema    [x ] All other systems negative    MEDICATIONS  (STANDING):  apixaban 5 milliGRAM(s) Oral two times a day  chlorhexidine 4% Liquid 1 Application(s) Topical <User Schedule>  levothyroxine 25 MICROGram(s) Oral daily  midodrine 10 milliGRAM(s) Oral every 8 hours  norepinephrine Infusion 0.05 MICROgram(s)/kG/Min (7.03 mL/Hr) IV Continuous <Continuous>  pancrelipase  (CREON 24,000 Lipase Units) 3 Capsule(s) Oral three times a day with meals  piperacillin/tazobactam IVPB.. 3.375 Gram(s) IV Intermittent every 8 hours    MEDICATIONS  (PRN):  acetaminophen     Tablet .. 650 milliGRAM(s) Oral every 6 hours PRN Temp greater or equal to 38C (100.4F)  oxyCODONE    IR 5 milliGRAM(s) Oral every 6 hours PRN Severe Pain (7 - 10)    Allergies    No Known Allergies    Intolerances    SOCIAL HISTORY:  ; Denies smoking, ETOH, drugs    FAMILY HISTORY: no pertinent family history among first degree relatives    Vital Signs Last 24 Hrs  T(C): 36.4 (05 Dec 2022 12:00), Max: 37.4 (04 Dec 2022 20:00)  T(F): 97.6 (05 Dec 2022 12:00), Max: 99.3 (04 Dec 2022 20:00)  HR: 88 (05 Dec 2022 15:00) (88 - 101)  BP: 110/62 (05 Dec 2022 15:00) (89/55 - 145/67)  BP(mean): 80 (05 Dec 2022 15:00) (66 - 96)  RR: 22 (05 Dec 2022 15:00) (17 - 29)  SpO2: 100% (05 Dec 2022 15:00) (96% - 100%)    Parameters below as of 05 Dec 2022 07:00  Patient On (Oxygen Delivery Method): room air    Physical Exam:  General: Alert, WNL  Ophthamology: sclera clear  ENMT: moist mucous membranes, trachea midline  Respiratory: equal chest rise with respirations  Gastrointestinal: soft NT/ND  Neurology: verbal, following commands  Psych: calm, appropriate  Musculoskeletal: no contractures  Vascular: BLE edema equal  Skin:  Sacral/bilateral buttocks with superficially eroded skin in and around the gluteal cleft L 1,5cm X W 1cm x D 0.1cm with white fibrinous tissue 100% covering wound bed, and scant serosanguinous drainage  No odor, erythema, increased warmth, tenderness, induration, fluctuance    LABS:  12-05    133<L>  |  105  |  24<H>  ----------------------------<  177<H>  5.0   |  19<L>  |  0.70    Ca    8.0<L>      05 Dec 2022 00:50  Phos  2.5     12-05  Mg     1.6     12-05    TPro  5.5<L>  /  Alb  1.9<L>  /  TBili  1.2  /  DBili  x   /  AST  82<H>  /  ALT  37  /  AlkPhos  166<H>  12-05                          7.5    7.21  )-----------( 149      ( 05 Dec 2022 00:56 )             23.0     PT/INR - ( 05 Dec 2022 00:57 )   PT: 24.0 sec;   INR: 2.07 ratio         PTT - ( 05 Dec 2022 00:57 )  PTT:37.2 sec

## 2022-12-05 NOTE — PROGRESS NOTE ADULT - ATTENDING COMMENTS
Pt was seen and examined during key portion of E/M service. Case discussed with resident. H&P reviewed and edited where appropriate. Other than the following, I agree with the above history, exam, assessment, and plan.  82 yo M (Alternate MRN [id number] Name: Cody Gatica) w/ HTN, HLD, HFrEF (EF 30-35%), VT, s/p AICD, AS s/p TAVR, CAD s/p stents, GERD, hypothyroid, anxiety, pancreatic ca s/p whipple 10/26/21, chemo and xrt with non-healing abdominal wound treated w/ HBOT and STSG 7/8/22, s/p repeat STSG wound vac placement on 9/29, PE on Xarelto, and recurrent hospitalizations for bleeding at graft site presents from [facility] with septic shock 2/2 colitis, bacteremia and PE, admitted to MICU for further management, s/p pressors and abx, now transferred to medicine floors.  Cont zosyn, midodrine, eliquis. Restart amio. ID consult. LE duplex. f/u wound care, PT recs. Restart home gabapentin, psych meds.  Once pressures improve, taper off midodrine and restart BB.

## 2022-12-05 NOTE — CONSULT NOTE ADULT - ASSESSMENT
Impression:    Sacral/bilateral Buttocks unstageable pressure injury present on admission  Incontinence of bowel and bladder  Incontinence Dermatitis    Recommend:  1.) topical therapy: sacral/buttock injury - cleanse with incontinence cleanser, pat dry, apply allevyn foam dressing every other day  2.) Incontinence Management - incontinence cleanser, pads, pericare BID,   3.) Maintain on an alternating air with low air loss surface  4.) Turn and reposition Q 2 hours  5.) Nutrition optimization  6.) Offload heels/feet with complete cair air fluidized boots; ensure that the soles of the feet are not resting on the foot board of the bed.    Care as per medicine. Will not actively follow but will remain available. Please recall for new issues or deterioration.  Upon [facility] [address] [place] [address] [phone]  Thank you for this consult  Seen and discussed with clinical nurse  Susan Leonard, KEATON-C, CWOCN [id number]

## 2022-12-05 NOTE — PROGRESS NOTE ADULT - PROBLEM SELECTOR PLAN 6
continue home dose Levothyroxine 25mcg QD Home Xanax 0.25mg qhs, consider resuming if needed for agitation/anxiety qhs PRN HFrEF s/p AICD  - Holding home Entresto, Toprol i/s/o shock state >> restart b-blocker once BP improves  - Amiodarone held on admission; will restart maintenance dose  - Euvolemic on exam, CTM  - Spoke with EP: no need for extraction of subcutaneous AICD  - Follow up TTE: EF 50%

## 2022-12-05 NOTE — PROGRESS NOTE ADULT - PROBLEM SELECTOR PLAN 10
- Holding home duloxetine/gabapentin  - On home oxycodone q6hr prn pain for diffuse pain S/p whipple, chemotherapy XRT. Localized recurrent disease identified in 11/22  - No disease modifying therapy while inpatient  - Family stating that prior plan was to seek further chemo at [facility] [place]  - Continue home Creon

## 2022-12-05 NOTE — PROGRESS NOTE ADULT - PROBLEM SELECTOR PROBLEM 4
Chronic HFrEF (heart failure with reduced ejection fraction) Pulmonary embolism Open abdominal wall wound

## 2022-12-05 NOTE — PROGRESS NOTE ADULT - SUBJECTIVE AND OBJECTIVE BOX
PROGRESS NOTE:   Authored by Dr. Eden Duncan / Internal Medicine Resident    Patient is a 81y old  Male who presents with a chief complaint of Hypotension (05 Dec 2022 15:19)      SUBJECTIVE / OVERNIGHT EVENTS:     ADDITIONAL REVIEW OF SYSTEMS:    MEDICATIONS  (STANDING):  apixaban 5 milliGRAM(s) Oral two times a day  chlorhexidine 4% Liquid 1 Application(s) Topical <User Schedule>  levothyroxine 25 MICROGram(s) Oral daily  midodrine 10 milliGRAM(s) Oral every 8 hours  pancrelipase  (CREON 24,000 Lipase Units) 3 Capsule(s) Oral three times a day with meals  piperacillin/tazobactam IVPB.. 3.375 Gram(s) IV Intermittent every 8 hours    MEDICATIONS  (PRN):  acetaminophen     Tablet .. 650 milliGRAM(s) Oral every 6 hours PRN Mild Pain (1 - 3)  oxyCODONE    IR 5 milliGRAM(s) Oral every 6 hours PRN Severe Pain (7 - 10)      CAPILLARY BLOOD GLUCOSE        I&O's Summary    04 Dec 2022 07:01  -  05 Dec 2022 07:00  --------------------------------------------------------  IN: 560 mL / OUT: 745 mL / NET: -185 mL    05 Dec 2022 07:01  -  05 Dec 2022 21:40  --------------------------------------------------------  IN: 100 mL / OUT: 305 mL / NET: -205 mL        PHYSICAL EXAM:  Vital Signs Last 24 Hrs  T(C): 36.6 (05 Dec 2022 20:48), Max: 37.3 (05 Dec 2022 00:00)  T(F): 97.8 (05 Dec 2022 20:48), Max: 99.1 (05 Dec 2022 00:00)  HR: 90 (05 Dec 2022 20:48) (88 - 101)  BP: 106/62 (05 Dec 2022 20:48) (92/54 - 145/67)  BP(mean): 74 (05 Dec 2022 20:00) (66 - 96)  RR: 18 (05 Dec 2022 20:48) (17 - 29)  SpO2: 98% (05 Dec 2022 20:48) (96% - 100%)    Parameters below as of 05 Dec 2022 20:48  Patient On (Oxygen Delivery Method): room air        GENERAL: NAD, lying comfortably in bed  HEAD: Atraumatic, normocephalic  EYES: EOMI b/l PERRLA b/l, conjunctiva and sclera clear  NECK: Supple, No JVD, No LAD  RESPIRATORY: Normal respiratory effort; lungs are clear to auscultation bilaterally  CARDIOVASCULAR: Regular rate and rhythm, normal S1 and S2, no murmur/rub/gallop; No lower extremity edema  ABDOMEN: Nontender, normoactive bowel sounds, no rebound/guarding; No hepatosplenomegaly  MUSCULOSKELETAL: no clubbing or cyanosis of digits; no joint swelling or tenderness to palpation  NEURO: Non focal   PSYCH: A+O to person, place, and time; affect appropriate    LABS:                          7.5    7.21  )-----------( 149      ( 05 Dec 2022 00:56 )             23.0     12-05    133<L>  |  105  |  24<H>  ----------------------------<  177<H>  5.0   |  19<L>  |  0.70    Ca    8.0<L>      05 Dec 2022 00:50  Phos  2.5     12-05  Mg     1.6     12-05    TPro  5.5<L>  /  Alb  1.9<L>  /  TBili  1.2  /  DBili  x   /  AST  82<H>  /  ALT  37  /  AlkPhos  166<H>  12-05    PT/INR - ( 05 Dec 2022 00:57 )   PT: 24.0 sec;   INR: 2.07 ratio         PTT - ( 05 Dec 2022 00:57 )  PTT:37.2 sec          Culture - Urine (collected 03 Dec 2022 10:44)  Source: Clean Catch Clean Catch (Midstream)  Final Report (04 Dec 2022 14:55):    No growth    Culture - Blood (collected 03 Dec 2022 10:44)  Source: .Blood Blood-Peripheral  Preliminary Report (04 Dec 2022 17:02):    No growth to date.    Culture - Blood (collected 03 Dec 2022 10:44)  Source: .Blood Blood-Peripheral  Preliminary Report (04 Dec 2022 17:02):    No growth to date.       PROGRESS NOTE:   Authored by Dr. Eden Duncan / Internal Medicine Resident    Patient is a 81y old  Male who presents with a chief complaint of Hypotension (05 Dec 2022 15:19)      SUBJECTIVE / OVERNIGHT EVENTS: No acute events. Patient minimally engaging in evaluation. Noted "yes" when asked if he had pain. Son at bedside reported that his mental status is improved but not at baseline yet.    ADDITIONAL REVIEW OF SYSTEMS: Unable to assess due to patients AMS. + abdominal pain.    MEDICATIONS  (STANDING):  apixaban 5 milliGRAM(s) Oral two times a day  chlorhexidine 4% Liquid 1 Application(s) Topical <User Schedule>  levothyroxine 25 MICROGram(s) Oral daily  midodrine 10 milliGRAM(s) Oral every 8 hours  pancrelipase  (CREON 24,000 Lipase Units) 3 Capsule(s) Oral three times a day with meals  piperacillin/tazobactam IVPB.. 3.375 Gram(s) IV Intermittent every 8 hours    MEDICATIONS  (PRN):  acetaminophen     Tablet .. 650 milliGRAM(s) Oral every 6 hours PRN Mild Pain (1 - 3)  oxyCODONE    IR 5 milliGRAM(s) Oral every 6 hours PRN Severe Pain (7 - 10)      CAPILLARY BLOOD GLUCOSE        I&O's Summary    04 Dec 2022 07:01  -  05 Dec 2022 07:00  --------------------------------------------------------  IN: 560 mL / OUT: 745 mL / NET: -185 mL    05 Dec 2022 07:01  -  05 Dec 2022 21:40  --------------------------------------------------------  IN: 100 mL / OUT: 305 mL / NET: -205 mL        PHYSICAL EXAM:  Vital Signs Last 24 Hrs  T(C): 36.6 (05 Dec 2022 20:48), Max: 37.3 (05 Dec 2022 00:00)  T(F): 97.8 (05 Dec 2022 20:48), Max: 99.1 (05 Dec 2022 00:00)  HR: 90 (05 Dec 2022 20:48) (88 - 101)  BP: 106/62 (05 Dec 2022 20:48) (92/54 - 145/67)  BP(mean): 74 (05 Dec 2022 20:00) (66 - 96)  RR: 18 (05 Dec 2022 20:48) (17 - 29)  SpO2: 98% (05 Dec 2022 20:48) (96% - 100%)    Parameters below as of 05 Dec 2022 20:48  Patient On (Oxygen Delivery Method): room air        GENERAL: NAD, lying comfortably in bed  HEAD: Atraumatic, normocephalic  EYES: EOMI b/l PERRLA b/l, conjunctiva and sclera clear  NECK: Supple, No JVD, No LAD  RESPIRATORY: Normal respiratory effort; lungs are clear to auscultation bilaterally  CARDIOVASCULAR: Regular rate and rhythm, normal S1 and S2, no murmur/rub/gallop; No lower extremity edema  ABDOMEN: tender, large clean bandage in the center of the abdomen, + BS, depressible tense  MUSCULOSKELETAL: no clubbing or cyanosis of digits; no joint swelling or tenderness to palpation  NEURO: unable to assess, patient not following instructions consistently  PSYCH: unable to assess, pt is AMS minimally engaging with evaluation.     LABS:                          7.5    7.21  )-----------( 149      ( 05 Dec 2022 00:56 )             23.0     12-05    133<L>  |  105  |  24<H>  ----------------------------<  177<H>  5.0   |  19<L>  |  0.70    Ca    8.0<L>      05 Dec 2022 00:50  Phos  2.5     12-05  Mg     1.6     12-05    TPro  5.5<L>  /  Alb  1.9<L>  /  TBili  1.2  /  DBili  x   /  AST  82<H>  /  ALT  37  /  AlkPhos  166<H>  12-05    PT/INR - ( 05 Dec 2022 00:57 )   PT: 24.0 sec;   INR: 2.07 ratio         PTT - ( 05 Dec 2022 00:57 )  PTT:37.2 sec          Culture - Urine (collected 03 Dec 2022 10:44)  Source: Clean Catch Clean Catch (Midstream)  Final Report (04 Dec 2022 14:55):    No growth    Culture - Blood (collected 03 Dec 2022 10:44)  Source: .Blood Blood-Peripheral  Preliminary Report (04 Dec 2022 17:02):    No growth to date.    Culture - Blood (collected 03 Dec 2022 10:44)  Source: .Blood Blood-Peripheral  Preliminary Report (04 Dec 2022 17:02):    No growth to date.       PROGRESS NOTE:   Authored by Dr. Eden Duncan / Internal Medicine Resident    Patient is a 81y old  Male who presents with a chief complaint of Hypotension (05 Dec 2022 15:19)    HPI:  82 yo M (Alternate MRN [id number] Name: Cody Gatica) w/ HTN, HLD, HFrEF (EF 30-35%), VT, s/p AICD, AS s/p TAVR, CAD s/p stents, GERD, hypothyroid, anxiety, pancreatic ca s/p whipple 10/26/21, chemo and xrt with non-healing abdominal wound treated w/ HBOT and STSG 7/8/22, s/p repeat STSG wound vac placement on 9/29, PE on Xarelto, and recurrent hospitalizations for bleeding at graft site presents from [facility] with hypotension, tachycardia, fever, likely i/s/o septic shock, admitted to MICU for further management.    MICU COURSE:  On admission blood cultures collected on 12/2 growing Strep anginosus and Klebsiella treated with zosyn; repeat blood cultures on 12/3 NGTD. MRSA negative s/p vanco x1. Initially required levophed for BP support but weaned off successfully and now on midodrine 10 q8. Patient also incidentally found to have acute pulmonary emboli in the left upper lobe and possibly in the lingula; bedside POCUS without evidence of RV strain. Originally AC was held after discussion due to chronically oozing abdominal wound; restarted during admission. Concurrently found to have colitis on treatment with zosyn.    HOME MEDS:  Alprazolam 0.25HS  Amiodarone 200mg QD  Duloxetine 60mg HS  Entresto 49-51mg  Gabapentin 100mg TID  Levothyroxine 25mcg QD  Oxycodone 5mg Pancrelipase 24K units 3caps TID  Pantoprazole 40mg QD  Tamsulosin 0.4mg  Simethicone  Toprol 25mg QD    ALL: no marco allergies    Past Surgical History  H/O Whipple procedure 2021, c/b nonhealing wound  S/P TAVR (transcatheter aortic valve replacement)   Status post skin graft using Integra wound dressing.    Family History: unknown patient is AMS unable to provide history    Social History: Patient lives with family, retired, no alcohol, tobacco or substance use.     SUBJECTIVE / OVERNIGHT EVENTS: No acute events. Patient minimally engaging in evaluation. Noted "yes" when asked if he had pain. Son at bedside reported that his mental status is improved but not at baseline yet.    ADDITIONAL REVIEW OF SYSTEMS: Unable to assess due to patients AMS. + abdominal pain.    MEDICATIONS  (STANDING):  apixaban 5 milliGRAM(s) Oral two times a day  chlorhexidine 4% Liquid 1 Application(s) Topical <User Schedule>  levothyroxine 25 MICROGram(s) Oral daily  midodrine 10 milliGRAM(s) Oral every 8 hours  pancrelipase  (CREON 24,000 Lipase Units) 3 Capsule(s) Oral three times a day with meals  piperacillin/tazobactam IVPB.. 3.375 Gram(s) IV Intermittent every 8 hours    MEDICATIONS  (PRN):  acetaminophen     Tablet .. 650 milliGRAM(s) Oral every 6 hours PRN Mild Pain (1 - 3)  oxyCODONE    IR 5 milliGRAM(s) Oral every 6 hours PRN Severe Pain (7 - 10)      CAPILLARY BLOOD GLUCOSE        I&O's Summary    04 Dec 2022 07:01  -  05 Dec 2022 07:00  --------------------------------------------------------  IN: 560 mL / OUT: 745 mL / NET: -185 mL    05 Dec 2022 07:01  -  05 Dec 2022 21:40  --------------------------------------------------------  IN: 100 mL / OUT: 305 mL / NET: -205 mL        PHYSICAL EXAM:  Vital Signs Last 24 Hrs  T(C): 36.6 (05 Dec 2022 20:48), Max: 37.3 (05 Dec 2022 00:00)  T(F): 97.8 (05 Dec 2022 20:48), Max: 99.1 (05 Dec 2022 00:00)  HR: 90 (05 Dec 2022 20:48) (88 - 101)  BP: 106/62 (05 Dec 2022 20:48) (92/54 - 145/67)  BP(mean): 74 (05 Dec 2022 20:00) (66 - 96)  RR: 18 (05 Dec 2022 20:48) (17 - 29)  SpO2: 98% (05 Dec 2022 20:48) (96% - 100%)    Parameters below as of 05 Dec 2022 20:48  Patient On (Oxygen Delivery Method): room air        GENERAL: NAD, lying comfortably in bed  HEAD: Atraumatic, normocephalic  EYES: EOMI b/l PERRLA b/l, conjunctiva and sclera clear  NECK: Supple, No JVD, No LAD  RESPIRATORY: Normal respiratory effort; lungs are clear to auscultation bilaterally  CARDIOVASCULAR: Regular rate and rhythm, normal S1 and S2, no murmur/rub/gallop; No lower extremity edema  ABDOMEN: tender, large clean bandage in the center of the abdomen, + BS, depressible tense  MUSCULOSKELETAL: no clubbing or cyanosis of digits; no joint swelling or tenderness to palpation  NEURO: unable to assess, patient not following instructions consistently  PSYCH: unable to assess, pt is AMS minimally engaging with evaluation.     LABS:                          7.5    7.21  )-----------( 149      ( 05 Dec 2022 00:56 )             23.0     12-05    133<L>  |  105  |  24<H>  ----------------------------<  177<H>  5.0   |  19<L>  |  0.70    Ca    8.0<L>      05 Dec 2022 00:50  Phos  2.5     12-05  Mg     1.6     12-05    TPro  5.5<L>  /  Alb  1.9<L>  /  TBili  1.2  /  DBili  x   /  AST  82<H>  /  ALT  37  /  AlkPhos  166<H>  12-05    PT/INR - ( 05 Dec 2022 00:57 )   PT: 24.0 sec;   INR: 2.07 ratio         PTT - ( 05 Dec 2022 00:57 )  PTT:37.2 sec          Culture - Urine (collected 03 Dec 2022 10:44)  Source: Clean Catch Clean Catch (Midstream)  Final Report (04 Dec 2022 14:55):    No growth    Culture - Blood (collected 03 Dec 2022 10:44)  Source: .Blood Blood-Peripheral  Preliminary Report (04 Dec 2022 17:02):    No growth to date.    Culture - Blood (collected 03 Dec 2022 10:44)  Source: .Blood Blood-Peripheral  Preliminary Report (04 Dec 2022 17:02):    No growth to date.       PROGRESS NOTE:   Authored by Dr. Eden Duncan / Internal Medicine Resident    Patient is a 81y old  Male who presents with a chief complaint of Hypotension (05 Dec 2022 15:19)    HPI:  80 yo M (Alternate MRN [id number] Name: Cody Gatica) w/ HTN, HLD, HFrEF (EF 30-35%), VT, s/p AICD, AS s/p TAVR, CAD s/p stents, GERD, hypothyroid, anxiety, pancreatic ca s/p whipple 10/26/21, chemo and xrt with non-healing abdominal wound treated w/ HBOT and STSG 7/8/22, s/p repeat STSG wound vac placement on 9/29, PE on Xarelto, and recurrent hospitalizations for bleeding at graft site presents from [facility] with septic shock 2/2 colitis, bacteremia and PE, admitted to MICU for further management, s/p pressors and abx, now transferred to medicine floors.    MICU COURSE:  On admission blood cultures collected on 12/2 growing Strep anginosus and Klebsiella treated with zosyn; repeat blood cultures on 12/3 NGTD. MRSA negative s/p vanco x1. Initially required levophed for BP support but weaned off successfully and now on midodrine 10 q8. Patient also incidentally found to have acute pulmonary emboli in the left upper lobe and possibly in the lingula; bedside POCUS without evidence of RV strain. Originally AC was held after discussion due to chronically oozing abdominal wound; restarted during admission. Concurrently found to have colitis on treatment with zosyn.    History primarily from chart. Pt is drowsy, arousable, following simple commands, but is not a good historian.     REVIEW OF SYSTEMS:  unknown patient is AMS unable to provide history    HOME MEDS:  Alprazolam 0.25HS  Amiodarone 200mg QD  Duloxetine 60mg HS  Entresto 49-51mg  Gabapentin 100mg TID  Levothyroxine 25mcg QD  Oxycodone 5mg Pancrelipase 24K units 3caps TID  Pantoprazole 40mg QD  Tamsulosin 0.4mg  Simethicone  Toprol 25mg QD    ALL: no marco allergies    Past Surgical History  H/O Whipple procedure 2021, c/b nonhealing wound  S/P TAVR (transcatheter aortic valve replacement)   Status post skin graft using Integra wound dressing.    Family History: unknown patient is AMS unable to provide history    Social History: Patient lives with family, retired, no alcohol, tobacco or substance use.     SUBJECTIVE / OVERNIGHT EVENTS: No acute events. Patient minimally engaging in evaluation. Noted "yes" when asked if he had pain. Son at bedside reported that his mental status is improved but not at baseline yet.    ADDITIONAL REVIEW OF SYSTEMS: Unable to assess due to patients AMS. + abdominal pain.    MEDICATIONS  (STANDING):  apixaban 5 milliGRAM(s) Oral two times a day  chlorhexidine 4% Liquid 1 Application(s) Topical <User Schedule>  levothyroxine 25 MICROGram(s) Oral daily  midodrine 10 milliGRAM(s) Oral every 8 hours  pancrelipase  (CREON 24,000 Lipase Units) 3 Capsule(s) Oral three times a day with meals  piperacillin/tazobactam IVPB.. 3.375 Gram(s) IV Intermittent every 8 hours    MEDICATIONS  (PRN):  acetaminophen     Tablet .. 650 milliGRAM(s) Oral every 6 hours PRN Mild Pain (1 - 3)  oxyCODONE    IR 5 milliGRAM(s) Oral every 6 hours PRN Severe Pain (7 - 10)      CAPILLARY BLOOD GLUCOSE        I&O's Summary    04 Dec 2022 07:01  -  05 Dec 2022 07:00  --------------------------------------------------------  IN: 560 mL / OUT: 745 mL / NET: -185 mL    05 Dec 2022 07:01  -  05 Dec 2022 21:40  --------------------------------------------------------  IN: 100 mL / OUT: 305 mL / NET: -205 mL        PHYSICAL EXAM:  Vital Signs Last 24 Hrs  T(C): 36.6 (05 Dec 2022 20:48), Max: 37.3 (05 Dec 2022 00:00)  T(F): 97.8 (05 Dec 2022 20:48), Max: 99.1 (05 Dec 2022 00:00)  HR: 90 (05 Dec 2022 20:48) (88 - 101)  BP: 106/62 (05 Dec 2022 20:48) (92/54 - 145/67)  BP(mean): 74 (05 Dec 2022 20:00) (66 - 96)  RR: 18 (05 Dec 2022 20:48) (17 - 29)  SpO2: 98% (05 Dec 2022 20:48) (96% - 100%)    Parameters below as of 05 Dec 2022 20:48  Patient On (Oxygen Delivery Method): room air        GENERAL: NAD, lying comfortably in bed  HEAD: Atraumatic, normocephalic  EYES: EOMI b/l PERRLA b/l, conjunctiva and sclera clear  NECK: Supple, No JVD, No LAD  RESPIRATORY: Normal respiratory effort; lungs are clear to auscultation bilaterally  CARDIOVASCULAR: Regular rate and rhythm, normal S1 and S2, no murmur/rub/gallop; No lower extremity edema  ABDOMEN: tender, large clean bandage in the center of the abdomen, + BS, depressible tense  MUSCULOSKELETAL: no clubbing or cyanosis of digits; no joint swelling or tenderness to palpation  NEURO: unable to assess, patient not following instructions consistently  PSYCH: unable to assess, pt is AMS minimally engaging with evaluation.       Personally reviewed imaging, labs.    LABS:                        7.5    7.21  )-----------( 149      ( 05 Dec 2022 00:56 )             23.0     12-05    133<L>  |  105  |  24<H>  ----------------------------<  177<H>  5.0   |  19<L>  |  0.70    Ca    8.0<L>      05 Dec 2022 00:50  Phos  2.5     12-05  Mg     1.6     12-05    TPro  5.5<L>  /  Alb  1.9<L>  /  TBili  1.2  /  DBili  x   /  AST  82<H>  /  ALT  37  /  AlkPhos  166<H>  12-05    PT/INR - ( 05 Dec 2022 00:57 )   PT: 24.0 sec;   INR: 2.07 ratio         PTT - ( 05 Dec 2022 00:57 )  PTT:37.2 sec          Culture - Urine (collected 03 Dec 2022 10:44)  Source: Clean Catch Clean Catch (Midstream)  Final Report (04 Dec 2022 14:55):    No growth    Culture - Blood (collected 03 Dec 2022 10:44)  Source: .Blood Blood-Peripheral  Preliminary Report (04 Dec 2022 17:02):    No growth to date.    Culture - Blood (collected 03 Dec 2022 10:44)  Source: .Blood Blood-Peripheral  Preliminary Report (04 Dec 2022 17:02):    No growth to date.

## 2022-12-05 NOTE — PROGRESS NOTE ADULT - PROBLEM SELECTOR PLAN 2
Chronic nonhealing abdominal wound  - S/p skin grafting  - Recurrent hospitalizations for bleeding from site  - Plastic surgery consulted, appreciate recs  - Wound care: apply hydrogel & Aquacel pad every other day Encephalopathy like 2/2 to severe sepsis  - Improved, occasional confusion but overall much improved mental status, AOx3 at baseline  - continue abx therapy as above patient presented w/ septic shock; BCx grew Strep Anginosus and Klebsiella vs colitis on CT  - now improved; off IV pressors  - on Midodrine 10mg TID; BP on the lower end of normal  - will start maintenance fluids 500cc x 5 hours  - wean off midodrine as tolerated  - once BP improved restart b-blocker

## 2022-12-05 NOTE — PROGRESS NOTE ADULT - PROBLEM SELECTOR PLAN 4
HFrEF s/p AICD  - Holding home Entresto, Toprol i/s/o shock state  - Holding home amiodarone for now  - Euvolemic on exam, CTM  - Spoke with EP: no need for extraction of subcutaneous AICD  - Follow up TTE Pulmonary emboli  - Reportedly not on AC outpatient 2/2 recurrent bleeding at nonhealing wound site, no bleeding since hospitalized here, will start on Eliquis 5 mg BID and monitor for any signs of bleeding  - POCUS w/o RV dilation acute pulmonary emboli in the left upper lobe and possibly in the lingula  - Has hx of portal vein thrombosis; reportedly not on AC outpatient 2/2 recurrent bleeding at nonhealing wound site  - no bleeding since hospitalized here, will start on Eliquis 5 mg BID and monitor for any signs of bleeding  - POCUS w/o RV dilation Chronic nonhealing abdominal wound  - S/p skin grafting  - Recurrent hospitalizations for bleeding from site  - Plastic surgery consulted, appreciate recs >> apply hydrogel & Aquacel pad every other day

## 2022-12-05 NOTE — PROGRESS NOTE ADULT - PROBLEM SELECTOR PLAN 8
S/p whipple, chemotherapy XRT. Localized recurrent disease identified in 11/22  - No disease modifying therapy while inpatient  - Family stating that prior plan was to seek further chemo at [facility] [place]  - Continue home Creon On Entresto at home  - on hold due to shock state; BP at goal  - on Midodrine 10mg q8h continue home dose Levothyroxine 25mcg QD

## 2022-12-05 NOTE — PROGRESS NOTE ADULT - PROBLEM SELECTOR PLAN 9
Diet: pureed diet  DVT ppx: on Eliquis  Dispo: pending S/p whipple, chemotherapy XRT. Localized recurrent disease identified in 11/22  - No disease modifying therapy while inpatient  - Family stating that prior plan was to seek further chemo at [facility] [place]  - Continue home Creon On Entresto at home  - on hold due to shock state; BP at goal  - on Midodrine 10mg q8h; wean as tolerated

## 2022-12-05 NOTE — CHART NOTE - NSCHARTNOTEFT_GEN_A_CORE
MICU Transfer Note    Transfer from: MICU  Transfer to:  (  ) Medicine    ( x ) Telemetry    (  ) RCU    (  ) Palliative    (  ) Stroke Unit    (  ) _______________  Accepting Physician: KENNY Alfred MD      MICU COURSE:    80 yo M (Alternate MRN [id number] Name: Cody Gatica) w/ HTN, HLD, HFrEF (EF 30-35%), VT, s/p AICD, AS s/p TAVR, CAD s/p stents, GERD, hypothyroid, anxiety, pancreatic ca s/p whipple 10/26/21, chemo and xrt with non-healing abdominal wound treated w/ HBOT and STSG 7/8/22, s/p repeat STSG wound vac placement on 9/29, PE on Xarelto, and recurrent hospitalizations for bleeding at graft site presents from [facility] with hypotension, tachycardia, fever, likely i/s/o septic shock, admitted to MICU for further management.  Blood cultures collected on 12/2 growing Strep anginosus and Klebsiella treated with zosyn; repeat blood cultures on 12/3 NGTD. MRSA negative s/p vanco x1. Initially required levophed for BP support but weaned off successfully and now on midodrine 10 q8. Patient also incidentally found to have acute pulmonary emboli in the left upper lobe and possibly in the lingula; bedside POCUS without evidence of RV strain. AC was held after discussion due to chronically oozing abdominal wound; AC also held outpatient for history for portal vein thrombosis. Currently on prophylactic dose heparin. Concurrently found to have typhlitis and on treatment with zosyn. Ongoing [facility] discussions with family with hopes of discharge home for future care planning.       ASSESSMENT & PLAN:   #NEURO  //Encephalopathy  - Arousable but drowsy on exam, AOx3 at baseline  - Likely i/s/o sepsis  - c/w neuro checks per floor protocol     //Neuropathy  - Holding home duloxetine/gabapentin for now while NPO  - On home oxycodone q6hr prn pain for diffuse pain    //Anxiety  - Home Xanax 0.25mg qhs i/s/o agitation; consider resuming if needed for agitation    #SKIN  //Nonhealing abdominal wound  - S/p skin grafting  - Recurrent hospitalizations for bleeding from site  - Plastic surgery consulted, appreciate recs  - Wound care: apply hydrogel & Aquacel pad every other day    #CARDIOVASCULAR  //Shock state, likely septic; improving   - Management of sepsis as per below  - off levophed  - C/w midodrine 10mg q8hr    //HFrEF s/p AICD  - Holding home Entresto, Toprol i/s/o shock state  - Holding home amiodarone for now  - Euvolemic on exam, CTM  - Follow up TTE    //AS s/p TAVR  - Not on AC given bleed from abdominal wound site    //Pulmonary emboli  - Reportedly not on AC outpatient for known portal vein thrombosis 2/2 recurrent bleeding at nonhealing wound site  - POCUS w/o RV dilation  - Prophylactic HSQ    #RESPIRATORY  - O2 sat appropriate on RA  - No active issues    #GI/FEN  - Pureed diet  - Continue home Creon    //Typhlitis, colitis  - Continue with antibiotics as per below    #RENAL/METABOLIC  - SCr appropriate, CTM  - Jimenez in place  - Holding home Flomax while NPO    #HEME/ONC  //Pancreatic cancer  - S/p whipple, chemotherapy XRT  - Localized recurrent disease identified in 11/22  - No disease modifying therapy while inpatient  - Family stating that [place] [facility] [place]    //Elevated INR  - I/s/o poor po  - CTM     //DVT ppx  - On HSQ prophylaxis  - Continue to monitor for bleeding    //Anemia  - Chronic in nature  - Transfuse hgb <7    #ENDOCRINE  - No active issues    //Hypothyroidism  - Continue home Synthroid    #ID  //Septic shock most likely from bacteremia (BCx grew streptococcus)  - Patient w/ history of MSSA bacteremia from foot wound 9/2021, 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  - S/p toe amputation by podiatry at that time  - Also with history of VRE bacteremia of unclear source  - CTAP w/ typhlitis, colitis  - Known sacral ulcer & abdominal wound, both appear clean  - Urinalysis WNL  - S/p Vanc/Zosyn in ED  - Continue Zosyn (12/2-)  - Levophed dc-ed; c/w midodrine  - Urine cultures NGTD  - F/u blood culture (12/2) grew streptococcus, MRSA swab (12/2)  - Repeat blood culture today (12/3)  - Send procal, ESR, CRP  - Podiatry consult  - Sputum culture    #ETHICS  - Spoke with daughter Kristi 12/2 who stated that she believe patient would want to be DNR/DNI though she is not the decision maker. Updated daughter Stacie later in the day. We discussed his overall poor prognosis with multiple hospitalizations due to multiple infections and complications from his cancer. She discussed their [place] plan [place] [facility] [place] but also expressed that she would mostly just want him to be able to come home, potentially with hospice level care. At this point, she states that she would like to treat this aggressively with aim of getting him out of the hospital. She states that he previously stated that he would want "everything done", so she would want him to be FULL CODE at this time.        For Follow-Up:    [ ] f/u TTE to evaluate for endocarditis   [ ] attempt to wean off midodrine  [ ] f/u final blood culture results from 12/3  [ ] telemetry monitoring for known PE  [ ] plastic surgery and wound care recommendations for bleeding abdominal wound       Vital Signs Last 24 Hrs  T(C): 37.3 (05 Dec 2022 00:00), Max: 37.4 (04 Dec 2022 20:00)  T(F): 99.1 (05 Dec 2022 00:00), Max: 99.3 (04 Dec 2022 20:00)  HR: 95 (05 Dec 2022 02:00) (87 - 99)  BP: 117/62 (05 Dec 2022 02:00) (82/52 - 146/65)  BP(mean): 81 (05 Dec 2022 02:00) (62 - 93)  RR: 23 (05 Dec 2022 02:00) (10 - 27)  SpO2: 100% (05 Dec 2022 02:00) (96% - 100%)    Parameters below as of 04 Dec 2022 20:00  Patient On (Oxygen Delivery Method): room air      I&O's Summary    03 Dec 2022 07:01  -  04 Dec 2022 07:00  --------------------------------------------------------  IN: 1270.8 mL / OUT: 731 mL / NET: 539.8 mL    04 Dec 2022 07:01  -  05 Dec 2022 05:15  --------------------------------------------------------  IN: 510 mL / OUT: 640 mL / NET: -130 mL          MEDICATIONS  (STANDING):  chlorhexidine 4% Liquid 1 Application(s) Topical <User Schedule>  heparin   Injectable 5000 Unit(s) SubCutaneous every 8 hours  levothyroxine 25 MICROGram(s) Oral daily  midodrine 10 milliGRAM(s) Oral every 8 hours  norepinephrine Infusion 0.05 MICROgram(s)/kG/Min (7.03 mL/Hr) IV Continuous <Continuous>  pancrelipase  (CREON 24,000 Lipase Units) 3 Capsule(s) Oral three times a day with meals  piperacillin/tazobactam IVPB.. 3.375 Gram(s) IV Intermittent every 8 hours    MEDICATIONS  (PRN):  acetaminophen     Tablet .. 650 milliGRAM(s) Oral every 6 hours PRN Temp greater or equal to 38C (100.4F)  oxyCODONE    IR 5 milliGRAM(s) Oral every 6 hours PRN Severe Pain (7 - 10)        LABS                                            7.5                   Neurophils% (auto):   83.5   (12-05 @ 00:56):    7.21 )-----------(149          Lymphocytes% (auto):  6.0                                           23.0                   Eosinphils% (auto):   0.3      Manual%: Neutrophils x    ; Lymphocytes x    ; Eosinophils x    ; Bands%: x    ; Blasts x                                    133    |  105    |  24                  Calcium: 8.0   / iCa: x      (12-05 @ 00:50)    ----------------------------<  177       Magnesium: 1.6                              5.0     |  19     |  0.70             Phosphorous: 2.5      TPro  5.5    /  Alb  1.9    /  TBili  1.2    /  DBili  x      /  AST  82     /  ALT  37     /  AlkPhos  166    05 Dec 2022 00:50    ( 12-05 @ 00:57 )   PT: 24.0 sec;   INR: 2.07 ratio  aPTT: 37.2 sec MICU Transfer Note    Transfer from: MICU  Transfer to:  ( x ) Medicine    ( ) Telemetry    (  ) RCU    (  ) Palliative    (  ) Stroke Unit    (  ) _______________  Accepting Physician: KENNY Alfred MD      MICU COURSE:    82 yo M (Alternate MRN [id number] Name: Cody Gatica) w/ HTN, HLD, HFrEF (EF 30-35%), VT, s/p AICD, AS s/p TAVR, CAD s/p stents, GERD, hypothyroid, anxiety, pancreatic ca s/p whipple 10/26/21, chemo and xrt with non-healing abdominal wound treated w/ HBOT and STSG 7/8/22, s/p repeat STSG wound vac placement on 9/29, PE on Xarelto, and recurrent hospitalizations for bleeding at graft site presents from [facility] with hypotension, tachycardia, fever, likely i/s/o septic shock, admitted to MICU for further management.  Blood cultures collected on 12/2 growing Strep anginosus and Klebsiella treated with zosyn; repeat blood cultures on 12/3 NGTD. MRSA negative s/p vanco x1. Initially required levophed for BP support but weaned off successfully and now on midodrine 10 q8. Patient also incidentally found to have acute pulmonary emboli in the left upper lobe and possibly in the lingula; bedside POCUS without evidence of RV strain. AC was held after discussion due to chronically oozing abdominal wound; AC also held outpatient for history for portal vein thrombosis. Currently on prophylactic dose heparin. Concurrently found to have typhlitis and on treatment with zosyn. Ongoing [facility] discussions with family with hopes of discharge home for future care planning.       ASSESSMENT & PLAN:   #NEURO  //Encephalopathy  - Arousable but drowsy on exam, AOx3 at baseline  - Likely i/s/o sepsis  - c/w neuro checks per floor protocol     //Neuropathy  - Holding home duloxetine/gabapentin for now while NPO  - On home oxycodone q6hr prn pain for diffuse pain    //Anxiety  - Home Xanax 0.25mg qhs i/s/o agitation; consider resuming if needed for agitation    #SKIN  //Nonhealing abdominal wound  - S/p skin grafting  - Recurrent hospitalizations for bleeding from site  - Plastic surgery consulted, appreciate recs  - Wound care: apply hydrogel & Aquacel pad every other day    #CARDIOVASCULAR  //Shock state, likely septic; improving   - Management of sepsis as per below  - off levophed  - C/w midodrine 10mg q8hr    //HFrEF s/p AICD  - Holding home Entresto, Toprol i/s/o shock state  - Holding home amiodarone for now  - Euvolemic on exam, CTM  - Follow up TTE    //AS s/p TAVR  - Not on AC given bleed from abdominal wound site    //Pulmonary emboli  - Reportedly not on AC outpatient for known portal vein thrombosis 2/2 recurrent bleeding at nonhealing wound site  - POCUS w/o RV dilation  - Prophylactic HSQ    #RESPIRATORY  - O2 sat appropriate on RA  - No active issues    #GI/FEN  - Pureed diet  - Continue home Creon    //Typhlitis, colitis  - Continue with antibiotics as per below    #RENAL/METABOLIC  - SCr appropriate, CTM  - Jimenez in place  - Holding home Flomax while NPO    #HEME/ONC  //Pancreatic cancer  - S/p whipple, chemotherapy XRT  - Localized recurrent disease identified in 11/22  - No disease modifying therapy while inpatient  - Family stating that prior [place] was [place] [facility] [place]    //Elevated INR  - I/s/o poor po  - CTM     //DVT ppx  - On HSQ prophylaxis  - Continue to monitor for bleeding    //Anemia  - Chronic in nature  - Transfuse hgb <7    #ENDOCRINE  - No active issues    //Hypothyroidism  - Continue home Synthroid    #ID  //Septic shock most likely from bacteremia (BCx grew streptococcus)  - Patient w/ history of MSSA bacteremia from foot wound 9/2021, 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  - S/p toe amputation by podiatry at that time  - Also with history of VRE bacteremia of unclear source  - CTAP w/ typhlitis, colitis  - Known sacral ulcer & abdominal wound, both appear clean  - Urinalysis WNL  - S/p Vanc/Zosyn in ED  - Continue Zosyn (12/2-)  - Levophed dc-ed; c/w midodrine  - Urine cultures NGTD  - F/u blood culture (12/2) grew streptococcus, MRSA swab (12/2)  - Repeat blood culture today (12/3)  - Send procal, ESR, CRP  - Podiatry consult  - Sputum culture    #ETHICS  - Spoke with daughter Kristi 12/2 who stated that she believe patient would want to be DNR/DNI though she is not the decision maker. Updated daughter Stacie later in the day. We discussed his overall poor prognosis with multiple hospitalizations due to multiple infections and complications from his cancer. She discussed their initial plan to [place] [facility] [place] but also expressed that she would mostly just want him to be able to come home, potentially with hospice level care. At this point, she states that she would like to treat this aggressively with aim of getting him out of the hospital. She states that he previously stated that he would want "everything done", so she would want him to be FULL CODE at this time.        For Follow-Up:    [ ] f/u TTE to evaluate for endocarditis   [ ] attempt to wean off midodrine  [ ] f/u final blood culture results from 12/3  [ ] telemetry monitoring for known PE  [ ] plastic surgery and wound care recommendations for bleeding abdominal wound       Vital Signs Last 24 Hrs  T(C): 37.3 (05 Dec 2022 00:00), Max: 37.4 (04 Dec 2022 20:00)  T(F): 99.1 (05 Dec 2022 00:00), Max: 99.3 (04 Dec 2022 20:00)  HR: 95 (05 Dec 2022 02:00) (87 - 99)  BP: 117/62 (05 Dec 2022 02:00) (82/52 - 146/65)  BP(mean): 81 (05 Dec 2022 02:00) (62 - 93)  RR: 23 (05 Dec 2022 02:00) (10 - 27)  SpO2: 100% (05 Dec 2022 02:00) (96% - 100%)    Parameters below as of 04 Dec 2022 20:00  Patient On (Oxygen Delivery Method): room air      I&O's Summary    03 Dec 2022 07:01  -  04 Dec 2022 07:00  --------------------------------------------------------  IN: 1270.8 mL / OUT: 731 mL / NET: 539.8 mL    04 Dec 2022 07:01  -  05 Dec 2022 05:15  --------------------------------------------------------  IN: 510 mL / OUT: 640 mL / NET: -130 mL          MEDICATIONS  (STANDING):  chlorhexidine 4% Liquid 1 Application(s) Topical <User Schedule>  heparin   Injectable 5000 Unit(s) SubCutaneous every 8 hours  levothyroxine 25 MICROGram(s) Oral daily  midodrine 10 milliGRAM(s) Oral every 8 hours  norepinephrine Infusion 0.05 MICROgram(s)/kG/Min (7.03 mL/Hr) IV Continuous <Continuous>  pancrelipase  (CREON 24,000 Lipase Units) 3 Capsule(s) Oral three times a day with meals  piperacillin/tazobactam IVPB.. 3.375 Gram(s) IV Intermittent every 8 hours    MEDICATIONS  (PRN):  acetaminophen     Tablet .. 650 milliGRAM(s) Oral every 6 hours PRN Temp greater or equal to 38C (100.4F)  oxyCODONE    IR 5 milliGRAM(s) Oral every 6 hours PRN Severe Pain (7 - 10)        LABS                                            7.5                   Neurophils% (auto):   83.5   (12-05 @ 00:56):    7.21 )-----------(149          Lymphocytes% (auto):  6.0                                           23.0                   Eosinphils% (auto):   0.3      Manual%: Neutrophils x    ; Lymphocytes x    ; Eosinophils x    ; Bands%: x    ; Blasts x                                    133    |  105    |  24                  Calcium: 8.0   / iCa: x      (12-05 @ 00:50)    ----------------------------<  177       Magnesium: 1.6                              5.0     |  19     |  0.70             Phosphorous: 2.5      TPro  5.5    /  Alb  1.9    /  TBili  1.2    /  DBili  x      /  AST  82     /  ALT  37     /  AlkPhos  166    05 Dec 2022 00:50    ( 12-05 @ 00:57 )   PT: 24.0 sec;   INR: 2.07 ratio  aPTT: 37.2 sec

## 2022-12-05 NOTE — PROGRESS NOTE ADULT - ASSESSMENT
#NEURO  //Encephalopathy  - Improved, occasional confusion but overall much improved mental status, AOx3 at baseline  - Likely i/s/o sepsis  - Neuro checks per ICU protocol    //Neuropathy  - Holding home duloxetine/gabapentin for now while NPO  - On home oxycodone q6hr prn pain for diffuse pain    //Anxiety  - Home Xanax 0.25mg qhs i/s/o agitation; consider resuming if needed for agitation    #SKIN  //Nonhealing abdominal wound  - S/p skin grafting  - Recurrent hospitalizations for bleeding from site  - Plastic surgery consulted, appreciate recs  - Wound care: apply hydrogel & Aquacel pad every other day    #CARDIOVASCULAR  //Shock state, likely septic; improving   - Management of sepsis as per below  - off levophed  - C/w midodrine 10mg q8hr    //HFrEF s/p AICD  - Holding home Entresto, Toprol i/s/o shock state  - Holding home amiodarone for now  - Euvolemic on exam, CTM  - Follow up TTE  - Spoke with EP: no need for extraction of subcutaneous AICD    //AS s/p TAVR  - Not on AC given bleed from wound site    //Pulmonary emboli  - Reportedly not on AC outpatient 2/2 recurrent bleeding at nonhealing wound site, no bleeding since hospitalized here, will start on Eliquis 5 mg BID and monitor for any signs of bleeding  - POCUS w/o RV dilation      #RESPIRATORY  - O2 sat appropriate on RA  - No active issues    #GI/FEN  - Pureed diet  - Continue home Creon    //Typhlitis, colitis  - Continue with antibiotics as per below    #RENAL/METABOLIC  - SCr appropriate, CTM  - Jimenez in place  - Holding home Flomax while NPO    #HEME/ONC  //Pancreatic cancer  - S/p whipple, chemotherapy XRT  - Localized recurrent disease identified in 11/22  - No disease modifying therapy while inpatient  - Family stating that prior plan was to [place] [facility] [place]    //Elevated INR  - I/s/o poor po  - CTM     //DVT ppx  - Will transition to Eliquis 5 mg BID   - Continue to monitor for bleeding    //Anemia  - Chronic in nature  - Transfuse hgb <7    #ENDOCRINE  - No active issues    //Hypothyroidism  - Continue home Synthroid    #ID  //Septic shock most likely from bacteremia (BCx grew streptococcus)  - Patient w/ history of MSSA bacteremia from foot wound 9/2021, 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  - S/p toe amputation by podiatry at that time  - Also with history of VRE bacteremia of unclear source  - CTAP w/ typhlitis, colitis  - Known sacral ulcer & abdominal wound, both appear clean  - Urinalysis WNL  - S/p Vanc/Zosyn in ED  - Continue Zosyn (12/2-)  - Levophed dc-ed; c/w midodrine  - Urine cultures NGTD  - F/u blood culture (12/2) grew streptococcus, MRSA swab (12/2)  - Repeat blood culture today (12/3)  - Send procal, ESR, CRP  - Podiatry consult  - Sputum culture  - D/C david     #ETHICS  - Spoke with daughter Kristi 12/2 who stated that she believe patient would want to be DNR/DNI though she is not the decision maker. Updated daughter Stacie later in the day. We discussed his overall poor prognosis with multiple hospitalizations due to multiple infections and complications from his cancer. She discussed their initial plan to take [place] [facility] [place] but also expressed that she would mostly just want him to be able to come home, potentially with hospice level care. At this point, she states that she would like to treat this    #NEURO  //Encephalopathy  - Improved, occasional confusion but overall much improved mental status, AOx3 at baseline  - Likely i/s/o sepsis  - Neuro checks per ICU protocol    //Neuropathy  - Holding home duloxetine/gabapentin for now while NPO  - On home oxycodone q6hr prn pain for diffuse pain          #CARDIOVASCULAR  //Shock state, likely septic; improving   - Management of sepsis as per below  - off levophed  - C/w midodrine 10mg q8hr              //Typhlitis, colitis  - Continue with antibiotics as per below    #RENAL/METABOLIC  - SCr appropriate, CTM  - Jimneez in place  - Holding home Flomax while NPO            //Anemia  - Chronic in nature  - Transfuse hgb <7            #ETHICS  - Spoke with daughter Kristi 12/2 who stated that she believe patient would want to be DNR/DNI though she is not the decision maker. Updated daughter Stacie later in the day. We discussed his overall poor prognosis with multiple hospitalizations due to multiple infections and complications from his cancer. She discussed their initial plan to take him to [facility] [place] but also expressed that she would mostly just want him to be able to come home, potentially with hospice level care. At this point, she states that she would like to treat this  82 yo M (Alternate MRN [id number] Name: Cody Gatica) w/ HTN, HLD, HFrEF (EF 30-35%) s/p AICD, AS s/p TAVR, CAD s/p stents, GERD, hypothyroid, anxiety, pancreatic ca s/p whipple 10/26/21, chemo and xrt with non-healing abdominal wound wound vac placement on 9/29, PE on Xarelto, and recurrent hospitalizations for bleeding at graft site presents from [facility] with hypotension, tachycardia, fever, likely i/s/o septic shock requiring pressor support and MICU admission now transferred to medicine floors for further management. 82 yo M (Alternate MRN [id number] Name: Cody Gatica) w/ HTN, HLD, HFrEF (EF 30-35%), VT, s/p AICD, AS s/p TAVR, CAD s/p stents, GERD, hypothyroid, anxiety, pancreatic ca s/p whipple 10/26/21, chemo and xrt with non-healing abdominal wound treated w/ HBOT and STSG 7/8/22, s/p repeat STSG wound vac placement on 9/29, PE on Xarelto, and recurrent hospitalizations for bleeding at graft site presents from [facility] with septic shock 2/2 colitis, bacteremia and PE, admitted to MICU for further management, s/p pressors and abx, now transferred to medicine floors.

## 2022-12-05 NOTE — PROGRESS NOTE ADULT - PROBLEM SELECTOR PLAN 5
Home Xanax 0.25mg qhs, consider resuming if needed for agitation/anxiety qhs PRN HFrEF s/p AICD  - Holding home Entresto, Toprol i/s/o shock state  - Holding home amiodarone for now  - Euvolemic on exam, CTM  - Spoke with EP: no need for extraction of subcutaneous AICD  - Follow up TTE acute pulmonary emboli in the left upper lobe and possibly in the lingula  - Has hx of portal vein thrombosis; reportedly not on AC outpatient 2/2 recurrent bleeding at nonhealing wound site  - no bleeding since hospitalized here, will start on Eliquis 5 mg BID and monitor for any signs of bleeding  - POCUS w/o RV dilation  - will order [facility] duplex

## 2022-12-05 NOTE — PROGRESS NOTE ADULT - PROBLEM SELECTOR PLAN 3
Pulmonary emboli  - Reportedly not on AC outpatient 2/2 recurrent bleeding at nonhealing wound site, no bleeding since hospitalized here, will start on Eliquis 5 mg BID and monitor for any signs of bleeding  - POCUS w/o RV dilation Chronic nonhealing abdominal wound  - S/p skin grafting  - Recurrent hospitalizations for bleeding from site  - Plastic surgery consulted, appreciate recs  - Wound care: apply hydrogel & Aquacel pad every other day Chronic nonhealing abdominal wound  - S/p skin grafting  - Recurrent hospitalizations for bleeding from site  - Plastic surgery consulted, appreciate recs >> apply hydrogel & Aquacel pad every other day Encephalopathy like 2/2 to severe sepsis  - Improved, occasional confusion but overall much improved mental status, AOx3 at baseline  - continue abx therapy as above  - CTH was negative

## 2022-12-05 NOTE — PROGRESS NOTE ADULT - SUBJECTIVE AND OBJECTIVE BOX
INTERVAL HPI/OVERNIGHT EVENTS:     SUBJECTIVE: Patient seen and examined at bedside.       VITAL SIGNS:  ICU Vital Signs Last 24 Hrs  T(C): 37.1 (05 Dec 2022 04:00), Max: 37.4 (04 Dec 2022 20:00)  T(F): 98.7 (05 Dec 2022 04:00), Max: 99.3 (04 Dec 2022 20:00)  HR: 90 (05 Dec 2022 07:00) (87 - 98)  BP: 117/61 (05 Dec 2022 07:00) (82/52 - 134/63)  BP(mean): 84 (05 Dec 2022 07:00) (62 - 92)  ABP: --  ABP(mean): --  RR: 19 (05 Dec 2022 07:00) (17 - 27)  SpO2: 100% (05 Dec 2022 07:00) (96% - 100%)    O2 Parameters below as of 05 Dec 2022 07:00  Patient On (Oxygen Delivery Method): room air            Plateau pressure:   P/F ratio:     12-04 @ 07:01  -  12-05 @ 07:00  --------------------------------------------------------  IN: 560 mL / OUT: 745 mL / NET: -185 mL      CAPILLARY BLOOD GLUCOSE        ECG:    PHYSICAL EXAM:    General:   HEENT:   Neck:   Respiratory:   Cardiovascular:   Abdomen:   Extremities:  Neurological:    MEDICATIONS:  MEDICATIONS  (STANDING):  chlorhexidine 4% Liquid 1 Application(s) Topical <User Schedule>  heparin   Injectable 5000 Unit(s) SubCutaneous every 8 hours  levothyroxine 25 MICROGram(s) Oral daily  midodrine 10 milliGRAM(s) Oral every 8 hours  norepinephrine Infusion 0.05 MICROgram(s)/kG/Min (7.03 mL/Hr) IV Continuous <Continuous>  pancrelipase  (CREON 24,000 Lipase Units) 3 Capsule(s) Oral three times a day with meals  piperacillin/tazobactam IVPB.. 3.375 Gram(s) IV Intermittent every 8 hours    MEDICATIONS  (PRN):  acetaminophen     Tablet .. 650 milliGRAM(s) Oral every 6 hours PRN Temp greater or equal to 38C (100.4F)  oxyCODONE    IR 5 milliGRAM(s) Oral every 6 hours PRN Severe Pain (7 - 10)      ALLERGIES:  Allergies    No Known Allergies    Intolerances        LABS:                        7.5    7.21  )-----------( 149      ( 05 Dec 2022 00:56 )             23.0     12-05    133<L>  |  105  |  24<H>  ----------------------------<  177<H>  5.0   |  19<L>  |  0.70    Ca    8.0<L>      05 Dec 2022 00:50  Phos  2.5     12-05  Mg     1.6     12-05    TPro  5.5<L>  /  Alb  1.9<L>  /  TBili  1.2  /  DBili  x   /  AST  82<H>  /  ALT  37  /  AlkPhos  166<H>  12-05    PT/INR - ( 05 Dec 2022 00:57 )   PT: 24.0 sec;   INR: 2.07 ratio         PTT - ( 05 Dec 2022 00:57 )  PTT:37.2 sec      RADIOLOGY & ADDITIONAL TESTS: Reviewed.   INTERVAL HPI/OVERNIGHT EVENTS:     SUBJECTIVE: Patient seen and examined at bedside. Says he "feels okay". In good spirits. Not in any pain. On room air, off pressors. On Midodrine 10 mg q8.       VITAL SIGNS:  ICU Vital Signs Last 24 Hrs  T(C): 37.1 (05 Dec 2022 04:00), Max: 37.4 (04 Dec 2022 20:00)  T(F): 98.7 (05 Dec 2022 04:00), Max: 99.3 (04 Dec 2022 20:00)  HR: 90 (05 Dec 2022 07:00) (87 - 98)  BP: 117/61 (05 Dec 2022 07:00) (82/52 - 134/63)  BP(mean): 84 (05 Dec 2022 07:00) (62 - 92)  ABP: --  ABP(mean): --  RR: 19 (05 Dec 2022 07:00) (17 - 27)  SpO2: 100% (05 Dec 2022 07:00) (96% - 100%)    O2 Parameters below as of 05 Dec 2022 07:00  Patient On (Oxygen Delivery Method): room air            Plateau pressure:   P/F ratio:     12-04 @ 07:01  -  12-05 @ 07:00  --------------------------------------------------------  IN: 560 mL / OUT: 745 mL / NET: -185 mL      CAPILLARY BLOOD GLUCOSE        ECG:    PHYSICAL EXAM:    General: NAD   Chest: Clear to auscultation bilaterally; no rales, rhonchi, or wheezing  Heart: Regular rate and rhythm; S1 and S2 intact; no murmurs, rubs, or gallops  Abd: Soft, nontender, nondistended  Nervous System: Alert, awake, somewhat confused at times  Psych: Appropriate affect  Ext: no peripheral LE edema bilaterally      MEDICATIONS:  MEDICATIONS  (STANDING):  chlorhexidine 4% Liquid 1 Application(s) Topical <User Schedule>  heparin   Injectable 5000 Unit(s) SubCutaneous every 8 hours  levothyroxine 25 MICROGram(s) Oral daily  midodrine 10 milliGRAM(s) Oral every 8 hours  norepinephrine Infusion 0.05 MICROgram(s)/kG/Min (7.03 mL/Hr) IV Continuous <Continuous>  pancrelipase  (CREON 24,000 Lipase Units) 3 Capsule(s) Oral three times a day with meals  piperacillin/tazobactam IVPB.. 3.375 Gram(s) IV Intermittent every 8 hours    MEDICATIONS  (PRN):  acetaminophen     Tablet .. 650 milliGRAM(s) Oral every 6 hours PRN Temp greater or equal to 38C (100.4F)  oxyCODONE    IR 5 milliGRAM(s) Oral every 6 hours PRN Severe Pain (7 - 10)      ALLERGIES:  Allergies    No Known Allergies    Intolerances        LABS:                        7.5    7.21  )-----------( 149      ( 05 Dec 2022 00:56 )             23.0     12-05    133<L>  |  105  |  24<H>  ----------------------------<  177<H>  5.0   |  19<L>  |  0.70    Ca    8.0<L>      05 Dec 2022 00:50  Phos  2.5     12-05  Mg     1.6     12-05    TPro  5.5<L>  /  Alb  1.9<L>  /  TBili  1.2  /  DBili  x   /  AST  82<H>  /  ALT  37  /  AlkPhos  166<H>  12-05    PT/INR - ( 05 Dec 2022 00:57 )   PT: 24.0 sec;   INR: 2.07 ratio         PTT - ( 05 Dec 2022 00:57 )  PTT:37.2 sec      RADIOLOGY & ADDITIONAL TESTS: Reviewed.

## 2022-12-05 NOTE — PROGRESS NOTE ADULT - PROBLEM SELECTOR PLAN 11
Diet: pureed diet  DVT ppx: on Eliquis  Dispo: pending  Ethics: FULL CODE. MICU team had conversation w/ daughter regarding poor prognosis and code status. For now daughter wants to continue treatment. Continue GOC conversation. - Duloxetine/gabapentin held on admission  - On home oxycodone q6hr prn pain for diffuse pain - Duloxetine/gabapentin held on admission. to be restarted.  - On home oxycodone q6hr prn pain for diffuse pain

## 2022-12-05 NOTE — PROGRESS NOTE ADULT - PROBLEM SELECTOR PLAN 7
On Entresto at home  - on hold due to shock state; BP at goal  - on Midodrine 10mg q8h continue home dose Levothyroxine 25mcg QD Home Xanax 0.25mg qhs, consider resuming if needed for agitation/anxiety qhs PRN

## 2022-12-06 NOTE — CONSULT NOTE ADULT - SUBJECTIVE AND OBJECTIVE BOX
Patient is a 81y old  Male who presents with a chief complaint of Hypotension (06 Dec 2022 07:14)    HPI:  Alternate MRN [id number] Name: Cody Gatica    81M with HTN, HLD, HFrEF (EF 30-35%), VT, s/p ACID, AS s/p TAVR, CAD s/p stents, GERD, hypothyroid, anxiety, pancreatic ca s/p whipple 10/26/21, chemo and xrt with non-healing abdominal wound treated with HBOT and STSG 7/8/22, s/p repeat STSG wound vac placement on 9/29, PE on Xarelto, and recurrent hospitalizations for bleeding at graft site presents from [facility] with hypotension, fever and tachycardia, admitted to MICU (12/2) for Shock and Acute Pulmonary Embolism, course complicated by Strep Anginosus and Klebsiella bacteremia, now off pressors and transferred to general medical floor.     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 Unclear source.                              PAST MEDICAL & SURGICAL HISTORY:  Hypertension  Hyperlipidemia  HFrEF (heart failure with reduced ejection fraction)  AICD (automatic cardioverter/defibrillator) present  Aortic stenosis  Pancreatic cancer  Portal vein thrombosis  Pulmonary embolus  Nonhealing surgical wound  H/O Whipple procedure  2021, c/b nonhealing wound  Status post skin graft using Integra wound dressing  S/P TAVR (transcatheter aortic valve replacement)      Allergies  No Known Allergies    ANTIMICROBIALS (past 90 days)  MEDICATIONS  (STANDING):  s/p Vanco (12/2 - 12/3)    piperacillin/tazobactam IVPB.. 3.375 every 8 hours (12/2 --- )    MEDICATIONS  (STANDING):  acetaminophen     Tablet .. 650 every 6 hours PRN  aMIOdarone    Tablet 200 daily  apixaban 5 two times a day  DULoxetine 60 daily  gabapentin 100 three times a day  levothyroxine 25 daily  midodrine 5 every 8 hours  oxyCODONE    IR 5 every 6 hours PRN  pancrelipase  (CREON 24,000 Lipase Units) 3 three times a day with meals    SOCIAL HISTORY:       FAMILY HISTORY:    REVIEW OF SYSTEMS  [  ] ROS unobtainable because:    [  ] All other systems negative except as noted below:	    Constitutional:  [ ] fever [ ] chills  [ ] weight loss  [ ] weakness  Skin:  [ ] rash [ ] phlebitis	  Eyes: [ ] icterus [ ] pain  [ ] discharge	  ENMT: [ ] sore throat  [ ] thrush [ ] ulcers [ ] exudates  Respiratory: [ ] dyspnea [ ] hemoptysis [ ] cough [ ] sputum	  Cardiovascular:  [ ] chest pain [ ] palpitations [ ] edema	  Gastrointestinal:  [ ] nausea [ ] vomiting [ ] diarrhea [ ] constipation [ ] pain	  Genitourinary:  [ ] dysuria [ ] frequency [ ] hematuria [ ] discharge [ ] flank pain  [ ] incontinence  Musculoskeletal:  [ ] myalgias [ ] arthralgias [ ] arthritis  [ ] back pain  Neurological:  [ ] headache [ ] seizures  [ ] confusion/altered mental status  Psychiatric:  [ ] anxiety [ ] depression	  Hematology/Lymphatics:  [ ] lymphadenopathy  Endocrine:  [ ] adrenal [ ] thyroid  Allergic/Immunologic:	 [ ] transplant [ ] seasonal    Vital Signs Last 24 Hrs  T(F): 96.8 (12-06-22 @ 12:56), Max: 101.3 (12-02-22 @ 11:45)  Vital Signs Last 24 Hrs  HR: 87 (12-06-22 @ 12:56) (87 - 99)  BP: 105/57 (12-06-22 @ 12:56) (105/57 - 122/58)  RR: 18 (12-06-22 @ 12:56)  SpO2: 96% (12-06-22 @ 12:56) (96% - 100%)  Wt(kg): --    Physical Exam:  Constitutional:  well preserved, comfortable  Head/Eyes: no icterus  ENT:  supple, no cervical lymphadenopathy   LUNGS:  CTA  CVS:  regular rhythm, no murmur  Abd:  soft, non-tender; non-distended  Ext:  no edema  Vascular:  IV site no erythema tenderness or discharge  MSK:  joints without swelling  Neuro: AAO X 3, non- focal                            7.3    3.46  )-----------( 128      ( 06 Dec 2022 07:06 )             22.4   12-06    136  |  106  |  18  ----------------------------<  126<H>  3.7   |  22  |  0.61    Ca    7.8<L>      06 Dec 2022 07:06  Phos  2.4     12-06  Mg     1.5     12-06    TPro  5.1<L>  /  Alb  1.9<L>  /  TBili  1.2  /  DBili  x   /  AST  59<H>  /  ALT  37  /  AlkPhos  223<H>  12-06    MICROBIOLOGY:  Culture - Urine (collected 03 Dec 2022 10:44)  Source: Clean Catch Clean Catch (Midstream)  Final Report (04 Dec 2022 14:55):    No growth    Culture - Blood (collected 03 Dec 2022 10:44)  Source: .Blood Blood-Peripheral  Preliminary Report (04 Dec 2022 17:02):    No growth to date.    Culture - Blood (collected 03 Dec 2022 10:44)  Source: .Blood Blood-Peripheral  Preliminary Report (04 Dec 2022 17:02):    No growth to date.    Culture - Urine (collected 02 Dec 2022 04:08)  Source: Clean Catch Clean Catch (Midstream)  Final Report (03 Dec 2022 07:50):    No growth    Culture - Blood (collected 02 Dec 2022 03:30)  Source: .Blood Blood-Peripheral  Gram Stain (04 Dec 2022 22:25):    Growth in anaerobic bottle: Gram positive cocci in pairs    Growth in aerobic bottle: Gram positive cocci in pairs  Preliminary Report (06 Dec 2022 11:39):    Growth in aerobic bottle: Streptococcus anginosus Susceptibility to    follow.    Growth in anaerobic bottle: Parvimonas micra "Susceptibilities not    performed"    Culture - Blood (collected 02 Dec 2022 03:28)  Source: .Blood Blood-Peripheral  Gram Stain (04 Dec 2022 13:03):    Growth in anaerobic bottle: Gram positive cocci in pairs    Growth in aerobic bottle: Gram Negative Rods  Final Report (06 Dec 2022 08:12):    Growth in aerobic bottle: Klebsiella oxytoca/Raoultella ornithinolytica    Growth in anaerobic bottle: Streptococcus anginosus    Alpha hemolytic strep    (not Strep. pneumoniae or Enterococcus)    Single set isolate, possible contaminant. Contact    Microbiology if susceptibility testing clinically    indicated.    ***Blood Panel PCR results on this specimen are available    approximately 3 hours after the Gram stain result.***    Gram stain, PCR, and/or culture results may not always    correspond due to difference in methodologies.    ************************************************************    This PCR assay was performed by multiplex PCR. This    Assay tests for 66 bacterial and resistance gene targets.    Please refer to the [facility] test directory    at [email or web address] for details.  Organism: Blood Culture PCR  Blood Culture PCR  Klebsiella oxytoca /Raoutella ornithinolytica (06 Dec 2022 08:12)  Organism: Klebsiella oxytoca /Raoutella ornithinolytica (06 Dec 2022 08:12)      -  Amikacin: S <=16      -  Ampicillin: R >16 These ampicillin results predict results for amoxicillin      -  Ampicillin/Sulbactam: S 8/4 Enterobacter, Klebsiella aerogenes, Citrobacter, and Serratia may develop resistance during prolonged therapy (3-4 days)      -  Aztreonam: S <=4      -  Cefazolin: I 4 Enterobacter, Klebsiella aerogenes, Citrobacter, and Serratia may develop resistance during prolonged therapy (3-4 days)      -  Cefepime: S <=2      -  Cefoxitin: S <=8      -  Ceftriaxone: S <=1 Enterobacter, Klebsiella aerogenes, Citrobacter, and Serratia may develop resistance during prolonged therapy      -  Ciprofloxacin: S <=0.25      -  Ertapenem: S <=0.5      -  Gentamicin: S <=2      -  Imipenem: S <=1      -  Levofloxacin: S <=0.5      -  Meropenem: S <=1      -  Piperacillin/Tazobactam: S <=8      -  Tobramycin: S <=2      -  Trimethoprim/Sulfamethoxazole: S <=0.5/9.5      Method Type: TYSON  Organism: Blood Culture PCR (06 Dec 2022 08:12)      -  Klebsiella oxytoca: Detec      Method Type: PCR  Organism: Blood Culture PCR (06 Dec 2022 08:12)      -  Streptococcus sp. (Not Grp A, B or S pneumoniae): Detec      Method Type: PCR    Rapid RVP Result: NotDetec (12-02 @ 04:07)      RADIOLOGY:  imaging below personally reviewed and agree with findings  < from: Xray Chest 1 View- PORTABLE-Urgent (12.02.22 @ 04:14) >  IMPRESSION:  Small bilateral pleural effusions    < end of copied text >  < from: CT Head No Cont (12.02.22 @ 05:42) >  IMPRESSION:      1. No acute intracranial CT abnormality.  2. Correlate clinically for acute left maxillary sinusitis.    < end of copied text >  < from: CT Abdomen and Pelvis w/ IV Cont (12.02.22 @ 05:47) >  IMPRESSION:    1. Acute pulmonary emboli in the left upperlobe and probably in the   lingula. Extensive motion artifact limits evaluation for other small   pulmonary emboli. Dr. Sutherland communicated the findings of acute PE with   Dr. Duncan at 6:35AM on 12/02/2022.  2. Areas of peritoneal thickening and enhancement, particularly adjacent   to the loculated ascites in the left paracolic gutter. This could   represent infectious peritonitis or peritoneal carcinomatosis/malignant   ascites.  3. Marked circumferential thickening of the cecum and proximal right   colon, suspicious for typhlitis. Nonspecific colitis including ischemic   etiology not excluded.  4. Thrombosis of the main portal vein likely extends into the right   portal vein.    < end of copied text >  < from: Xray Foot AP + Lateral, Bilateral (12.03.22 @ 15:28) >  IMPRESSION:  1.  Postsurgical changes at the right first ray and left second proximal   phalanx  2.  No radiographic evidence for osteomyelitis. If clinical concern   persists, MRI can be performed for moresensitive evaluation.    < end of copied text >     HPI:  Alternate MRN [id number] Name: Cody Gatica    81M with HTN, HLD, HFrEF (EF 30-35%), VT, s/p ACID, AS s/p TAVR, CAD s/p stents, GERD, hypothyroid, anxiety, pancreatic ca s/p whipple 10/26/21, chemo and xrt with non-healing abdominal wound treated with HBOT and STSG 7/8/22, s/p repeat STSG wound vac placement on 9/29, PE on Xarelto, and recurrent hospitalizations for bleeding at graft site presents from [facility] with hypotension, fever and tachycardia, admitted to MICU (12/2) for Shock and Acute Pulmonary Embolism, course complicated by Strep Anginosus and Klebsiella bacteremia, now off pressors and transferred to general medical floor.     Patient ---     AAOx3 baseline    Patient had history of Osteomyelitis right foot s/p right hallux amputation 9/2021, with MSSA bacteremia, s/p explantation and reimplantation of AICD 10/4/21.     On 11/2021, patient was hospitalized for High grade VRE Bacteremia, without clear source ?TAVR ? PICC line ? PPM or ?abdominal collection but aspiration without growth on culture, CORBIN without vegetation. Eventually, patient was treated empirically for endocarditis 6 weeks Ampicillin/CTX since PPM was not removed.     On 3/2022 patient was readmitted for R 2nd Toe chronic wound and concerns for infection, was treated for cellulitis with Augmentin, Blood Cx without growth, Wound culture with citrobacter, Klebsiella and enterococcus, Bone scar negative for OM.                       PAST MEDICAL & SURGICAL HISTORY:  Hypertension  Hyperlipidemia  HFrEF (heart failure with reduced ejection fraction)  AICD (automatic cardioverter/defibrillator) present  Aortic stenosis  Pancreatic cancer  Portal vein thrombosis  Pulmonary embolus  Nonhealing surgical wound  H/O Whipple procedure  2021, c/b nonhealing wound  Status post skin graft using Integra wound dressing  S/P TAVR (transcatheter aortic valve replacement)      Allergies  No Known Allergies    ANTIMICROBIALS (past 90 days)  MEDICATIONS  (STANDING):  s/p Vanco (12/2 - 12/3)    piperacillin/tazobactam IVPB.. 3.375 every 8 hours (12/2 --- )    MEDICATIONS  (STANDING):  acetaminophen     Tablet .. 650 every 6 hours PRN  aMIOdarone    Tablet 200 daily  apixaban 5 two times a day  DULoxetine 60 daily  gabapentin 100 three times a day  levothyroxine 25 daily  midodrine 5 every 8 hours  oxyCODONE    IR 5 every 6 hours PRN  pancrelipase  (CREON 24,000 Lipase Units) 3 three times a day with meals    SOCIAL HISTORY:       FAMILY HISTORY:    REVIEW OF SYSTEMS  [  ] ROS unobtainable because:    [  ] All other systems negative except as noted below:	    Constitutional:  [ ] fever [ ] chills  [ ] weight loss  [ ] weakness  Skin:  [ ] rash [ ] phlebitis	  Eyes: [ ] icterus [ ] pain  [ ] discharge	  ENMT: [ ] sore throat  [ ] thrush [ ] ulcers [ ] exudates  Respiratory: [ ] dyspnea [ ] hemoptysis [ ] cough [ ] sputum	  Cardiovascular:  [ ] chest pain [ ] palpitations [ ] edema	  Gastrointestinal:  [ ] nausea [ ] vomiting [ ] diarrhea [ ] constipation [ ] pain	  Genitourinary:  [ ] dysuria [ ] frequency [ ] hematuria [ ] discharge [ ] flank pain  [ ] incontinence  Musculoskeletal:  [ ] myalgias [ ] arthralgias [ ] arthritis  [ ] back pain  Neurological:  [ ] headache [ ] seizures  [ ] confusion/altered mental status  Psychiatric:  [ ] anxiety [ ] depression	  Hematology/Lymphatics:  [ ] lymphadenopathy  Endocrine:  [ ] adrenal [ ] thyroid  Allergic/Immunologic:	 [ ] transplant [ ] seasonal    Vital Signs Last 24 Hrs  T(F): 96.8 (12-06-22 @ 12:56), Max: 101.3 (12-02-22 @ 11:45)  Vital Signs Last 24 Hrs  HR: 87 (12-06-22 @ 12:56) (87 - 99)  BP: 105/57 (12-06-22 @ 12:56) (105/57 - 122/58)  RR: 18 (12-06-22 @ 12:56)  SpO2: 96% (12-06-22 @ 12:56) (96% - 100%)  Wt(kg): --    Physical Exam:  Constitutional:  well preserved, comfortable  Head/Eyes: no icterus  ENT:  supple, no cervical lymphadenopathy   LUNGS:  CTA  CVS:  regular rhythm, no murmur  Abd:  soft, non-tender; non-distended  Ext:  no edema  Vascular:  IV site no erythema tenderness or discharge  MSK:  joints without swelling  Neuro: AAO X 3, non- focal                            7.3    3.46  )-----------( 128      ( 06 Dec 2022 07:06 )             22.4   12-06    136  |  106  |  18  ----------------------------<  126<H>  3.7   |  22  |  0.61    Ca    7.8<L>      06 Dec 2022 07:06  Phos  2.4     12-06  Mg     1.5     12-06    TPro  5.1<L>  /  Alb  1.9<L>  /  TBili  1.2  /  DBili  x   /  AST  59<H>  /  ALT  37  /  AlkPhos  223<H>  12-06    MICROBIOLOGY:  Culture - Urine (collected 03 Dec 2022 10:44)  Source: Clean Catch Clean Catch (Midstream)  Final Report (04 Dec 2022 14:55):    No growth    Culture - Blood (collected 03 Dec 2022 10:44)  Source: .Blood Blood-Peripheral  Preliminary Report (04 Dec 2022 17:02):    No growth to date.    Culture - Blood (collected 03 Dec 2022 10:44)  Source: .Blood Blood-Peripheral  Preliminary Report (04 Dec 2022 17:02):    No growth to date.    Culture - Urine (collected 02 Dec 2022 04:08)  Source: Clean Catch Clean Catch (Midstream)  Final Report (03 Dec 2022 07:50):    No growth    Culture - Blood (collected 02 Dec 2022 03:30)  Source: .Blood Blood-Peripheral  Gram Stain (04 Dec 2022 22:25):    Growth in anaerobic bottle: Gram positive cocci in pairs    Growth in aerobic bottle: Gram positive cocci in pairs  Preliminary Report (06 Dec 2022 11:39):    Growth in aerobic bottle: Streptococcus anginosus Susceptibility to    follow.    Growth in anaerobic bottle: Parvimonas micra "Susceptibilities not    performed"    Culture - Blood (collected 02 Dec 2022 03:28)  Source: .Blood Blood-Peripheral  Gram Stain (04 Dec 2022 13:03):    Growth in anaerobic bottle: Gram positive cocci in pairs    Growth in aerobic bottle: Gram Negative Rods  Final Report (06 Dec 2022 08:12):    Growth in aerobic bottle: Klebsiella oxytoca/Raoultella ornithinolytica    Growth in anaerobic bottle: Streptococcus anginosus    Alpha hemolytic strep    (not Strep. pneumoniae or Enterococcus)    Single set isolate, possible contaminant. Contact    Microbiology if susceptibility testing clinically    indicated.    ***Blood Panel PCR results on this specimen are available    approximately 3 hours after the Gram stain result.***    Gram stain, PCR, and/or culture results may not always    correspond due to difference in methodologies.    ************************************************************    This PCR assay was performed by multiplex PCR. This    Assay tests for 66 bacterial and resistance gene targets.    Please refer to the [facility] test directory    at [email or web address] for details.  Organism: Blood Culture PCR  Blood Culture PCR  Klebsiella oxytoca /Raoutella ornithinolytica (06 Dec 2022 08:12)  Organism: Klebsiella oxytoca /Raoutella ornithinolytica (06 Dec 2022 08:12)      -  Amikacin: S <=16      -  Ampicillin: R >16 These ampicillin results predict results for amoxicillin      -  Ampicillin/Sulbactam: S 8/4 Enterobacter, Klebsiella aerogenes, Citrobacter, and Serratia may develop resistance during prolonged therapy (3-4 days)      -  Aztreonam: S <=4      -  Cefazolin: I 4 Enterobacter, Klebsiella aerogenes, Citrobacter, and Serratia may develop resistance during prolonged therapy (3-4 days)      -  Cefepime: S <=2      -  Cefoxitin: S <=8      -  Ceftriaxone: S <=1 Enterobacter, Klebsiella aerogenes, Citrobacter, and Serratia may develop resistance during prolonged therapy      -  Ciprofloxacin: S <=0.25      -  Ertapenem: S <=0.5      -  Gentamicin: S <=2      -  Imipenem: S <=1      -  Levofloxacin: S <=0.5      -  Meropenem: S <=1      -  Piperacillin/Tazobactam: S <=8      -  Tobramycin: S <=2      -  Trimethoprim/Sulfamethoxazole: S <=0.5/9.5      Method Type: TYSON  Organism: Blood Culture PCR (06 Dec 2022 08:12)      -  Klebsiella oxytoca: Detec      Method Type: PCR  Organism: Blood Culture PCR (06 Dec 2022 08:12)      -  Streptococcus sp. (Not Grp A, B or S pneumoniae): Detec      Method Type: PCR    Rapid RVP Result: NotDetec (12-02 @ 04:07)      RADIOLOGY:  imaging below personally reviewed and agree with findings  < from: Xray Chest 1 View- PORTABLE-Urgent (12.02.22 @ 04:14) >  IMPRESSION:  Small bilateral pleural effusions    < end of copied text >  < from: CT Head No Cont (12.02.22 @ 05:42) >  IMPRESSION:      1. No acute intracranial CT abnormality.  2. Correlate clinically for acute left maxillary sinusitis.    < end of copied text >  < from: CT Abdomen and Pelvis w/ IV Cont (12.02.22 @ 05:47) >  IMPRESSION:    1. Acute pulmonary emboli in the left upperlobe and probably in the   lingula. Extensive motion artifact limits evaluation for other small   pulmonary emboli. Dr. Sutherland communicated the findings of acute PE with   Dr. Duncan at 6:35AM on 12/02/2022.  2. Areas of peritoneal thickening and enhancement, particularly adjacent   to the loculated ascites in the left paracolic gutter. This could   represent infectious peritonitis or peritoneal carcinomatosis/malignant   ascites.  3. Marked circumferential thickening of the cecum and proximal right   colon, suspicious for typhlitis. Nonspecific colitis including ischemic   etiology not excluded.  4. Thrombosis of the main portal vein likely extends into the right   portal vein.    < end of copied text >  < from: Xray Foot AP + Lateral, Bilateral (12.03.22 @ 15:28) >  IMPRESSION:  1.  Postsurgical changes at the right first ray and left second proximal   phalanx  2.  No radiographic evidence for osteomyelitis. If clinical concern   persists, MRI can be performed for moresensitive evaluation.    < end of copied text >     HPI:  Alternate MRN [id number] Name: Cody Gatica    81M with HTN, HLD, HFrEF (EF 30-35%), VT, s/p ACID, AS s/p TAVR, CAD s/p stents, GERD, hypothyroid, anxiety, pancreatic ca s/p whipple 10/26/21, chemo and xrt with non-healing abdominal wound treated with HBOT and STSG 7/8/22, s/p repeat STSG wound vac placement on 9/29, PE on Xarelto, and recurrent hospitalizations for bleeding at graft site presents from [facility] with hypotension, fever and tachycardia, admitted to MICU (12/2) for Shock and Acute Pulmonary Embolism, course complicated by Strep Anginosus and Klebsiella bacteremia, now off pressors and transferred to general medical floor.     Patient had history of Osteomyelitis right foot s/p right hallux amputation 9/2021, with MSSA bacteremia, s/p explantation and reimplantation of AICD 10/4/21.     On 11/2021, patient was hospitalized for High grade VRE Bacteremia, without clear source ?TAVR ? PICC line ? PPM or ?abdominal collection but aspiration without growth on culture, CORBIN without vegetation. Eventually, patient was treated empirically for endocarditis 6 weeks Ampicillin/CTX since PPM was not removed.     On 3/2022 patient was readmitted for R 2nd Toe chronic wound and concerns for infection, was treated for cellulitis with Augmentin, Blood Cx without growth, Wound culture with citrobacter, Klebsiella and enterococcus, Bone scar negative for OM.     Currently, patient has no acute complaints but remains drowsy and mildly confused AAOx2. Limited history and ROS due to mental status.    Initially febrile Tmax 101F but has since been afebrile on RA  WBC 7 > 9 > 3  Cr 0.61  UA negative  CT Chest with acute YANG PE  CT AP with loculated ascites ?infectious peritonitis or carcinomatosis, also evidence of typhilitis, +thrombosis in main portal vein  CTH unremarkable  US doppler with DVT  MRSA negative  RVP / COVID negative  BCx (12/2) with Kleb oxytoca, Strep anginosus, Parvimonas micra  BCx (12/3) NGTD    s/p Vanco (12/2 - 12/3)    piperacillin/tazobactam IVPB.. 3.375 every 8 hours (12/2 --- )      PAST MEDICAL & SURGICAL HISTORY:  Hypertension  Hyperlipidemia  HFrEF (heart failure with reduced ejection fraction)  AICD (automatic cardioverter/defibrillator) present  Aortic stenosis  Pancreatic cancer  Portal vein thrombosis  Pulmonary embolus  Nonhealing surgical wound  H/O Whipple procedure  2021, c/b nonhealing wound  Status post skin graft using Integra wound dressing  S/P TAVR (transcatheter aortic valve replacement)      Allergies  No Known Allergies    ANTIMICROBIALS (past 90 days)  MEDICATIONS  (STANDING):  s/p Vanco (12/2 - 12/3)    piperacillin/tazobactam IVPB.. 3.375 every 8 hours (12/2 --- )    MEDICATIONS  (STANDING):  acetaminophen     Tablet .. 650 every 6 hours PRN  aMIOdarone    Tablet 200 daily  apixaban 5 two times a day  DULoxetine 60 daily  gabapentin 100 three times a day  levothyroxine 25 daily  midodrine 5 every 8 hours  oxyCODONE    IR 5 every 6 hours PRN  pancrelipase  (CREON 24,000 Lipase Units) 3 three times a day with meals    SOCIAL HISTORY:   unable to fully obtain due to current mental status    FAMILY HISTORY:  unable to fully obtain due to current mental status    REVIEW OF SYSTEMS  [ x ] ROS unobtainable because:  unable to fully obtain due to current mental status  [  ] All other systems negative except as noted below:	    Constitutional:  [ ] fever [ ] chills  [ ] weight loss  [ ] weakness  Skin:  [ ] rash [ ] phlebitis	  Eyes: [ ] icterus [ ] pain  [ ] discharge	  ENMT: [ ] sore throat  [ ] thrush [ ] ulcers [ ] exudates  Respiratory: [ ] dyspnea [ ] hemoptysis [ ] cough [ ] sputum	  Cardiovascular:  [ ] chest pain [ ] palpitations [ ] edema	  Gastrointestinal:  [ ] nausea [ ] vomiting [ ] diarrhea [ ] constipation [ ] pain	  Genitourinary:  [ ] dysuria [ ] frequency [ ] hematuria [ ] discharge [ ] flank pain  [ ] incontinence  Musculoskeletal:  [ ] myalgias [ ] arthralgias [ ] arthritis  [ ] back pain  Neurological:  [ ] headache [ ] seizures  [ ] confusion/altered mental status  Psychiatric:  [ ] anxiety [ ] depression	  Hematology/Lymphatics:  [ ] lymphadenopathy  Endocrine:  [ ] adrenal [ ] thyroid  Allergic/Immunologic:	 [ ] transplant [ ] seasonal    Vital Signs Last 24 Hrs  T(F): 96.8 (12-06-22 @ 12:56), Max: 101.3 (12-02-22 @ 11:45)  Vital Signs Last 24 Hrs  HR: 87 (12-06-22 @ 12:56) (87 - 99)  BP: 105/57 (12-06-22 @ 12:56) (105/57 - 122/58)  RR: 18 (12-06-22 @ 12:56)  SpO2: 96% (12-06-22 @ 12:56) (96% - 100%)  Wt(kg): --    Physical Exam:  Constitutional:  drowsy but nontoxic appearing, comfortable  Head/Eyes: no icterus  ENT:  supple, no cervical lymphadenopathy   LUNGS:  CTA  CVS:  regular rhythm, no murmur  Abd:  soft, non-tender; distended, clean bandage on abdominal wall wound  Ext:  no edema  Vascular:  IV site no erythema tenderness or discharge  MSK:  joints without swelling  Neuro: AAO X 2, non- focal                            7.3    3.46  )-----------( 128      ( 06 Dec 2022 07:06 )             22.4   12-06    136  |  106  |  18  ----------------------------<  126<H>  3.7   |  22  |  0.61    Ca    7.8<L>      06 Dec 2022 07:06  Phos  2.4     12-06  Mg     1.5     12-06    TPro  5.1<L>  /  Alb  1.9<L>  /  TBili  1.2  /  DBili  x   /  AST  59<H>  /  ALT  37  /  [employer]  223<H>  12-06    MICROBIOLOGY:  Culture - Urine (collected 03 Dec 2022 10:44)  Source: Clean Catch Clean Catch (Midstream)  Final Report (04 Dec 2022 14:55):    No growth    Culture - Blood (collected 03 Dec 2022 10:44)  Source: .Blood Blood-Peripheral  Preliminary Report (04 Dec 2022 17:02):    No growth to date.    Culture - Blood (collected 03 Dec 2022 10:44)  Source: .Blood Blood-Peripheral  Preliminary Report (04 Dec 2022 17:02):    No growth to date.    Culture - Urine (collected 02 Dec 2022 04:08)  Source: Clean Catch Clean Catch (Midstream)  Final Report (03 Dec 2022 07:50):    No growth    Culture - Blood (collected 02 Dec 2022 03:30)  Source: .Blood Blood-Peripheral  Gram Stain (04 Dec 2022 22:25):    Growth in anaerobic bottle: Gram positive cocci in pairs    Growth in aerobic bottle: Gram positive cocci in pairs  Preliminary Report (06 Dec 2022 11:39):    Growth in aerobic bottle: Streptococcus anginosus Susceptibility to    follow.    Growth in anaerobic bottle: Parvimonas micra "Susceptibilities not    performed"    Culture - Blood (collected 02 Dec 2022 03:28)  Source: .Blood Blood-Peripheral  Gram Stain (04 Dec 2022 13:03):    Growth in anaerobic bottle: Gram positive cocci in pairs    Growth in aerobic bottle: Gram Negative Rods  Final Report (06 Dec 2022 08:12):    Growth in aerobic bottle: Klebsiella oxytoca/Raoultella ornithinolytica    Growth in anaerobic bottle: Streptococcus anginosus    Alpha hemolytic strep    (not Strep. pneumoniae or Enterococcus)    Single set isolate, possible contaminant. Contact    Microbiology if susceptibility testing clinically    indicated.    ***Blood Panel PCR results on this specimen are available    approximately 3 hours after the Gram stain result.***    Gram stain, PCR, and/or culture results may not always    correspond due to difference in methodologies.    ************************************************************    This PCR assay was performed by multiplex PCR. This    Assay tests for 66 bacterial and resistance gene targets.    Please refer to the [facility] test directory    at [email or web address] for details.  Organism: Blood Culture PCR  Blood Culture PCR  Klebsiella oxytoca /Raoutella ornithinolytica (06 Dec 2022 08:12)  Organism: Klebsiella oxytoca /Raoutella ornithinolytica (06 Dec 2022 08:12)      -  Amikacin: S <=16      -  Ampicillin: R >16 These ampicillin results predict results for amoxicillin      -  Ampicillin/Sulbactam: S 8/4 Enterobacter, Klebsiella aerogenes, Citrobacter, and Serratia may develop resistance during prolonged therapy (3-4 days)      -  Aztreonam: S <=4      -  Cefazolin: I 4 Enterobacter, Klebsiella aerogenes, Citrobacter, and Serratia may develop resistance during prolonged therapy (3-4 days)      -  Cefepime: S <=2      -  Cefoxitin: S <=8      -  Ceftriaxone: S <=1 Enterobacter, Klebsiella aerogenes, Citrobacter, and Serratia may develop resistance during prolonged therapy      -  Ciprofloxacin: S <=0.25      -  Ertapenem: S <=0.5      -  Gentamicin: S <=2      -  Imipenem: S <=1      -  Levofloxacin: S <=0.5      -  Meropenem: S <=1      -  Piperacillin/Tazobactam: S <=8      -  Tobramycin: S <=2      -  Trimethoprim/Sulfamethoxazole: S <=0.5/9.5      Method Type: TYSON  Organism: Blood Culture PCR (06 Dec 2022 08:12)      -  Klebsiella oxytoca: Detec      Method Type: PCR  Organism: Blood Culture PCR (06 Dec 2022 08:12)      -  Streptococcus sp. (Not Grp A, B or S pneumoniae): Detec      Method Type: PCR    Rapid RVP Result: NotDetec (12-02 @ 04:07)      RADIOLOGY:  imaging below personally reviewed and agree with findings  < from: Xray Chest 1 View- PORTABLE-Urgent (12.02.22 @ 04:14) >  IMPRESSION:  Small bilateral pleural effusions    < end of copied text >  < from: CT Head No Cont (12.02.22 @ 05:42) >  IMPRESSION:      1. No acute intracranial CT abnormality.  2. Correlate clinically for acute left maxillary sinusitis.    < end of copied text >  < from: CT Abdomen and Pelvis w/ IV Cont (12.02.22 @ 05:47) >  IMPRESSION:    1. Acute pulmonary emboli in the left upperlobe and probably in the   lingula. Extensive motion artifact limits evaluation for other small   pulmonary emboli. Dr. Sutherland communicated the findings of acute PE with   Dr. Duncan at 6:35AM on 12/02/2022.  2. Areas of peritoneal thickening and enhancement, particularly adjacent   to the loculated ascites in the left paracolic gutter. This could   represent infectious peritonitis or peritoneal carcinomatosis/malignant   ascites.  3. Marked circumferential thickening of the cecum and proximal right   colon, suspicious for typhlitis. Nonspecific colitis including ischemic   etiology not excluded.  4. Thrombosis of the main portal vein likely extends into the right   portal vein.    < end of copied text >  < from: Xray Foot AP + Lateral, Bilateral (12.03.22 @ 15:28) >  IMPRESSION:  1.  Postsurgical changes at the right first ray and left second proximal   phalanx  2.  No radiographic evidence for osteomyelitis. If clinical concern   persists, MRI can be performed for moresensitive evaluation.    < end of copied text >

## 2022-12-06 NOTE — PROGRESS NOTE ADULT - PROBLEM SELECTOR PLAN 6
HFrEF s/p AICD  - Holding home Entresto, Toprol i/s/o shock state >> restart b-blocker once BP improves  - Amiodarone held on admission; will restart maintenance dose  - Euvolemic on exam, CTM  - Spoke with EP: no need for extraction of subcutaneous AICD  - Follow up TTE: EF 50%

## 2022-12-06 NOTE — PHYSICAL THERAPY INITIAL EVALUATION ADULT - ADDITIONAL COMMENTS
Pt is poor historian; history obtained from daughter Pippa. Pt presents to [facility] from [facility]. Prior to that, up until 2 months ago pt was residing alone in an apartment with no steps to enter and no steps inside, pt dd have HHA every morning, but was mostly independent with functional mobility with no AD.

## 2022-12-06 NOTE — CONSULT NOTE ADULT - ATTENDING COMMENTS
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     #Septic shock from polymicrobial bacteremia suspect GI source/ colitis/ typhlitis   -Now septic shock state resolved  -Continue zosyn  -Repeat blood cultures no growth 12/3  -CORBIN to evaluate for IE given TAVR   -F/U sensitivity of the strep  -Paracentesis to evaluate for source of bacteremia - possibly from peritonitis  -Monitor fever curve  -Trend WBC    #Abd wound  -Will be evaluated by Plastics    #Acute YANG PE  -AC per primary team.    Cristopher Adrian MD  Available through MS Teams  If no response, or after 5pm/weekends, call [phone]

## 2022-12-06 NOTE — MEDICAL STUDENT PROGRESS NOTE(EDUCATION) - NS MD HP STUD ASPLAN PLAN FT
Problem/Plan - 1:  ·  Problem: Bacteremia.   ·  Plan: 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  - CTAP w/ colitis   - Known sacral ulcer & abdominal wound, both appear clean  - Urinalysis WNL | Urine cultures NGTD | F/u blood culture (12/2), MRSA swab (12/2)  - Continue Zosyn (12/2-) for 7d, Klebsiella susceptible, Step Anginosis susceptibility pending  - can consider CORBIN if culture result is positive for suspected species, TTE performed on 12/4 grossly normal EF 50%  - ID consult placed for coverage and duration of abx and recommendations regarding CORBIN.     Problem/Plan - 2:  ·  Problem: Septic shock.   ·  Plan: patient presented w/ septic shock; BCx grew Strep Anginosus and Klebsiella vs colitis on CT  - now improved; off IV pressors  - on Midodrine 10mg TID; BP on the lower end of normal. Wean as tolerated to 5 mg TID with hold precautions.  - once BP improved restart b-blocker.     Problem/Plan - 3:  ·  Problem: Septic encephalopathy.   ·  Plan: Encephalopathy like 2/2 to severe sepsis  - Improved, occasional confusion but overall much improved mental status, AOx3 at baseline  - continue abx therapy as above  - CTH was negative.     Problem/Plan - 4:  ·  Problem: Open abdominal wall wound.   ·  Plan: Chronic nonhealing abdominal wound, appears clean and well healing  - S/p skin grafting  - Recurrent hospitalizations for bleeding from site  - Plastic surgery consulted, appreciate recs >> apply hydrogel & Aquacel pad every other day.     Problem/Plan - 5:  ·  Problem: Pulmonary embolism.   ·  Plan: acute pulmonary emboli in the left upper lobe and possibly in the lingula  - Has hx of portal vein thrombosis, confirmed on CTAP 12/2; reportedly not on AC outpatient 2/2 recurrent bleeding at nonhealing wound site  - no bleeding since hospitalized here, will start on Eliquis 5 mg BID and monitor for any signs of bleeding  - POCUS w/o RV dilation  - PM CBC to monitor Hgb levels. If Hgb below threshold of 7, d/c Eliquis and transfuse.       Problem/Plan - 6:  ·  Problem: Chronic HFrEF (heart failure with reduced ejection fraction).   ·  Plan: HFrEF s/p AICD  - Holding home Entresto, Toprol i/s/o shock state >> restart b-blocker once BP improves  - Amiodarone held on admission; will restart maintenance dose  - Euvolemic on exam, CTM  - Spoke with EP: no need for extraction of subcutaneous AICD  - Follow up TTE: EF 50%.Consider CORBIN.     Problem/Plan - 7:  ·  Problem: Anxiety.   ·  Plan: Home Xanax 0.25mg qhs, consider resuming if needed for agitation/anxiety qhs PRN.     Problem/Plan - 8:  ·  Problem: Hypothyroid.   ·  Plan: continue home dose Levothyroxine 25mcg QD.     Problem/Plan - 9:  ·  Problem: Hypertension.   ·  Plan: On Entresto at home  - on hold due to shock state; BP at goal  - on Midodrine 5mg q8h; wean as tolerated.     Problem/Plan - 10:  ·  Problem: Pancreatic cancer.   ·  Plan; S/p whipple, chemotherapy XRT. Localized recurrent disease identified in 11/22  - No disease modifying therapy while inpatient  - Family stating that prior plan was to seek further chemo at [facility]  - Continue home Creon.     Problem/Plan - 11:  ·  Problem: Sensory neuropathy.   ·  Plan: - Duloxetine/gabapentin held on admission. to be restarted.  - On home oxycodone q6hr prn pain for diffuse pain.       Problem/Plan - 12:  ·  Problem: Urinary retention.   - Failed TOVx2. Straight cath returned 400 ccs this afternoon.   - Start Flomax 0.4 mg today     Problem/Plan - 13:  ·  Problem: Prophylactic measure.   ·  Plan: Diet:  DVT PPT: Eliquis  Dispo: from [facility], pending clinical improvement

## 2022-12-06 NOTE — PROGRESS NOTE ADULT - SUBJECTIVE AND OBJECTIVE BOX
PROGRESS NOTE:   Authored by Dr. John Paulino  Patient is a 81y old  Male who presents with a chief complaint of Hypotension, bacteremia (05 Dec 2022 21:38)      SUBJECTIVE / OVERNIGHT EVENTS:    ADDITIONAL REVIEW OF SYSTEMS:  Review of Systems:  Constitutional: No fever, No weight loss, good appetite/po intake  Head: No headache   Eyes: No blurry vision, No diplopia  Neuro: No tremors, No muscle weakness   Cardiovascular: No chest pain, No palpitations  Respiratory: No SOB, No cough  GI: No nausea, No vomiting, No diarrhea  : No dysuria, No hematuria  Skin: No rash  MSK: No joint pain   Psych: No depression  Heme: No abnormal bruising, no abnormal bleeding    MEDICATIONS  (STANDING):  aMIOdarone    Tablet 200 milliGRAM(s) Oral daily  apixaban 5 milliGRAM(s) Oral two times a day  chlorhexidine 4% Liquid 1 Application(s) Topical <User Schedule>  DULoxetine 60 milliGRAM(s) Oral daily  gabapentin 100 milliGRAM(s) Oral three times a day  lactated ringers. 500 milliLiter(s) (100 mL/Hr) IV Continuous <Continuous>  levothyroxine 25 MICROGram(s) Oral daily  midodrine 10 milliGRAM(s) Oral every 8 hours  pancrelipase  (CREON 24,000 Lipase Units) 3 Capsule(s) Oral three times a day with meals  piperacillin/tazobactam IVPB.. 3.375 Gram(s) IV Intermittent every 8 hours    MEDICATIONS  (PRN):  acetaminophen     Tablet .. 650 milliGRAM(s) Oral every 6 hours PRN Mild Pain (1 - 3)  oxyCODONE    IR 5 milliGRAM(s) Oral every 6 hours PRN Severe Pain (7 - 10)      CAPILLARY BLOOD GLUCOSE        I&O's Summary    05 Dec 2022 07:01  -  06 Dec 2022 07:00  --------------------------------------------------------  IN: 750 mL / OUT: 705 mL / NET: 45 mL        PHYSICAL EXAM:  Vital Signs Last 24 Hrs  T(C): 36.6 (06 Dec 2022 04:19), Max: 36.8 (06 Dec 2022 00:40)  T(F): 97.8 (06 Dec 2022 04:19), Max: 98.2 (06 Dec 2022 00:40)  HR: 92 (06 Dec 2022 04:19) (88 - 101)  BP: 108/65 (06 Dec 2022 04:19) (101/58 - 145/67)  BP(mean): 74 (05 Dec 2022 20:00) (74 - 96)  RR: 18 (06 Dec 2022 04:19) (17 - 29)  SpO2: 98% (06 Dec 2022 04:19) (98% - 100%)    Parameters below as of 06 Dec 2022 04:19  Patient On (Oxygen Delivery Method): room air        GENERAL: NAD, lying comfortably in bed  HEAD: Atraumatic, normocephalic  EYES: EOMI b/l PERRLA b/l, conjunctiva and sclera clear  NECK: Supple, No JVD, No LAD  RESPIRATORY: Normal respiratory effort; lungs are clear to auscultation bilaterally  CARDIOVASCULAR: Regular rate and rhythm, normal S1 and S2, no murmur/rub/gallop; No lower extremity edema  ABDOMEN: Nontender, normoactive bowel sounds, no rebound/guarding; No hepatosplenomegaly  MUSCULOSKELETAL: no clubbing or cyanosis of digits; no joint swelling or tenderness to palpation  NEURO: Non focal   PSYCH: A+O to person, place, and time; affect appropriate     PROGRESS NOTE:   Authored by Dr. John Paulino  Patient is a 81y old  Male who presents with a chief complaint of Hypotension, bacteremia (05 Dec 2022 21:38)      SUBJECTIVE / OVERNIGHT EVENTS:  pt seen at bedside with family member present, no acute complains.     ADDITIONAL REVIEW OF SYSTEMS:  Review of Systems:  Constitutional: No fever, No weight loss, good appetite/po intake  Head: No headache   Eyes: No blurry vision, No diplopia  Neuro: No tremors, No muscle weakness   Cardiovascular: No chest pain, No palpitations  Respiratory: No SOB, No cough  GI: No nausea, No vomiting, No diarrhea  : No dysuria, No hematuria  Skin: No rash  MSK: No joint pain   Psych: No depression  Heme: No abnormal bruising, no abnormal bleeding    MEDICATIONS  (STANDING):  aMIOdarone    Tablet 200 milliGRAM(s) Oral daily  apixaban 5 milliGRAM(s) Oral two times a day  chlorhexidine 4% Liquid 1 Application(s) Topical <User Schedule>  DULoxetine 60 milliGRAM(s) Oral daily  gabapentin 100 milliGRAM(s) Oral three times a day  lactated ringers. 500 milliLiter(s) (100 mL/Hr) IV Continuous <Continuous>  levothyroxine 25 MICROGram(s) Oral daily  midodrine 10 milliGRAM(s) Oral every 8 hours  pancrelipase  (CREON 24,000 Lipase Units) 3 Capsule(s) Oral three times a day with meals  piperacillin/tazobactam IVPB.. 3.375 Gram(s) IV Intermittent every 8 hours    MEDICATIONS  (PRN):  acetaminophen     Tablet .. 650 milliGRAM(s) Oral every 6 hours PRN Mild Pain (1 - 3)  oxyCODONE    IR 5 milliGRAM(s) Oral every 6 hours PRN Severe Pain (7 - 10)      CAPILLARY BLOOD GLUCOSE        I&O's Summary    05 Dec 2022 07:01  -  06 Dec 2022 07:00  --------------------------------------------------------  IN: 750 mL / OUT: 705 mL / NET: 45 mL        PHYSICAL EXAM:  Vital Signs Last 24 Hrs  T(C): 36.6 (06 Dec 2022 04:19), Max: 36.8 (06 Dec 2022 00:40)  T(F): 97.8 (06 Dec 2022 04:19), Max: 98.2 (06 Dec 2022 00:40)  HR: 92 (06 Dec 2022 04:19) (88 - 101)  BP: 108/65 (06 Dec 2022 04:19) (101/58 - 145/67)  BP(mean): 74 (05 Dec 2022 20:00) (74 - 96)  RR: 18 (06 Dec 2022 04:19) (17 - 29)  SpO2: 98% (06 Dec 2022 04:19) (98% - 100%)    Parameters below as of 06 Dec 2022 04:19  Patient On (Oxygen Delivery Method): room air    GENERAL: NAD, lying comfortably in bed  HEAD: Atraumatic, normocephalic  EYES: EOMI b/l PERRLA b/l, conjunctiva and sclera clear  NECK: Supple, No JVD, No LAD  RESPIRATORY: Normal respiratory effort; lungs are clear to auscultation bilaterally  CARDIOVASCULAR: Regular rate and rhythm, normal S1 and S2, no murmur/rub/gallop; No lower extremity edema  ABDOMEN: Nontender, normoactive bowel sounds, no rebound/guarding; No hepatosplenomegaly  MUSCULOSKELETAL: no clubbing or cyanosis of digits; no joint swelling or tenderness to palpation  NEURO: Non focal   PSYCH: A+O to person, place, and time; affect appropriate        LABS:                        7.3    3.46  )-----------( 128      ( 06 Dec 2022 07:06 )             22.4     12-06    136  |  106  |  18  ----------------------------<  126<H>  3.7   |  22  |  0.61    Ca    7.8<L>      06 Dec 2022 07:06  Phos  2.4     12-06  Mg     1.5     12-06    TPro  5.1<L>  /  Alb  1.9<L>  /  TBili  1.2  /  DBili  x   /  AST  59<H>  /  ALT  37  /  AlkPhos  223<H>  12-06    PT/INR - ( 06 Dec 2022 07:06 )   PT: 24.6 sec;   INR: 2.12 ratio         PTT - ( 06 Dec 2022 07:06 )  PTT:45.0 sec

## 2022-12-06 NOTE — PROGRESS NOTE ADULT - PROBLEM SELECTOR PLAN 4
Chronic nonhealing abdominal wound  - S/p skin grafting  - Recurrent hospitalizations for bleeding from site  - Plastic surgery consulted, appreciate recs >> apply hydrogel & Aquacel pad every other day

## 2022-12-06 NOTE — CONSULT NOTE ADULT - ASSESSMENT
WORK UP          DIAGNOSIS and IMPRESSION          RECOMMENDATIONS        PT TO BE SEEN. PRELIM NOTE  PENDING RECS. PLEASE WAIT FOR FINAL RECS AFTER DISCUSSION WITH ATTENDING#    Jose Irby DO, PGY-4   ID fellow  [employer] Teams Preferred  After 5pm/weekends call [phone]   WORK UP  WBC 7 > 9 > 3  Cr 0.61  UA negative  CT Chest with acute YANG PE  CT AP with loculated ascites ?infectious peritonitis or carcinomatosis, also evidence of typhilitis, +thrombosis in main portal vein  CTH unremarkable  US doppler with DVT  MRSA negative  RVP / COVID negative  BCx (12/2) with Kleb oxytoca, Strep anginosus, Parvimonas micra  BCx (12/3) NGTD    s/p Vanco (12/2 - 12/3)    DIAGNOSIS and IMPRESSION  Alternate MRN [id number] Name: Cody Gatica    81M with HTN, HLD, HFrEF (EF 30-35%), s/p ACID, AS s/p TAVR, CAD s/p stents, Osteomyelitis right foot s/p right hallux amputation (9/2021), with MSSA bacteremia, s/p explantation and reimplantation of AICD (10/4/21), Pancreatic ca s/p whipple (10/26/21), chemo and xrt with non-healing abdominal wound treated with Hyperbaric Therapy and Skin Graft, 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     #Shock, resolved  #Acute YANG Pulmonary Embolism  #Colitis, Typhilitis  #Bacteremia (mixed Strep anginosus and Kleb oxytoca, Parvimonas micra unclear significant)    - unclear source, possible intraabdominal from ?loculated ascites ?colitis   - afebrile without acute complaints, but somewhat drowsy and confused    RECOMMENDATIONS  - continue with zosyn for now  - ?TTE ?Diagnostic paracentesis   - follow up Strep anginosus sensitivity  - follow up repeat Blood Cultures       PT TO BE SEEN. PRELIM NOTE  PENDING RECS. PLEASE WAIT FOR FINAL RECS AFTER DISCUSSION WITH ATTENDINGRigo Irby DO, PGY-4   ID fellow  [employer] Teams Preferred  After 5pm/weekends call [phone]   WORK UP  WBC 7 > 9 > 3  Cr 0.61  UA negative  CT Chest with acute YANG PE  CT AP with loculated ascites ?infectious peritonitis or carcinomatosis, also evidence of typhilitis, +thrombosis in main portal vein  CTH unremarkable  US doppler with DVT  MRSA negative  RVP / COVID negative  BCx (12/2) with Kleb oxytoca, Strep anginosus, Parvimonas micra  BCx (12/3) NGTD    s/p Vanco (12/2 - 12/3)    DIAGNOSIS and IMPRESSION  Alternate MRN [id number] Name: Cody Gatica    81M with HTN, HLD, HFrEF (EF 30-35%), s/p ACID, AS s/p TAVR, CAD s/p stents, Osteomyelitis right foot s/p right hallux amputation (9/2021), with MSSA bacteremia, s/p explantation and reimplantation of AICD (10/4/21), Pancreatic ca s/p whipple (10/26/21), chemo and xrt with non-healing abdominal wound treated with Hyperbaric Therapy and Skin Graft, 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     #Shock, resolved  #Acute YANG Pulmonary Embolism  #Colitis, Typhilitis  #Bacteremia (mixed Strep anginosus and Kleb oxytoca, Parvimonas micra unclear significant)    - unclear source, possible intraabdominal from ?loculated ascites ?colitis   - afebrile without acute complaints, but somewhat drowsy and confused    RECOMMENDATIONS  - continue with zosyn for now  - obtain CORBIN r/o endocarditis given TAVR history  - obtain Diagnostic paracentesis r/o peritonitis as possible source of bacteremia  - follow up Strep anginosus sensitivity  - follow up repeat Blood Cultures     Case d/w attending and primary team      Jose Irby DO, PGY-4   ID fellow  [employer] Teams Preferred  After 5pm/weekends call [phone]

## 2022-12-06 NOTE — PROGRESS NOTE ADULT - PROBLEM SELECTOR PLAN 10
S/p whipple, chemotherapy XRT. Localized recurrent disease identified in 11/22  - No disease modifying therapy while inpatient  - Family stating that prior plan was to seek further chemo at [facility]  - Continue home Creon

## 2022-12-06 NOTE — MEDICAL STUDENT PROGRESS NOTE(EDUCATION) - NS MD HP STUD ASPLAN ASSES FT
82 yo M w/ HTN, HLD, HFrEF (EF 30-35%), VT, s/p AICD, AS s/p TAVR, CAD s/p stents, GERD, hypothyroid, anxiety, pancreatic ca s/p whipple 10/26/21, chemo and xrt with non-healing abdominal wound treated w/ HBOT and STSG 7/8/22, s/p repeat STSG wound vac placement on 9/29, PE on Xarelto, and recurrent hospitalizations for bleeding at graft site presents from [facility] with septic shock 2/2 colitis, bacteremia and PE, admitted to MICU for further management, s/p pressors and abx, now transferred to medicine floors.

## 2022-12-06 NOTE — PROGRESS NOTE ADULT - ATTENDING COMMENTS
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     #Bacteremia  #Sepsis  #PE  #CHF  #Metabolic encephalopathy  #Abdominal wound  #Pancreatic ca    - ID consulted, continue zoysn for now, f/u sensitivities  - TTE to r/o endocarditis  - IR consult for para to r/o source of infection  - repeat blood cultures pending  - started on AC for PE, monitor Hgb closely given high risk for bleeding  - wean off midodrine  - TOV today  - wound care and plastic surgery for abdominal wound

## 2022-12-06 NOTE — PROGRESS NOTE ADULT - PROBLEM SELECTOR PLAN 11
- Duloxetine/gabapentin held on admission. to be restarted.  - On home oxycodone q6hr prn pain for diffuse pain

## 2022-12-06 NOTE — PROGRESS NOTE ADULT - ASSESSMENT
80 yo M (Alternate MRN [id number] Name: Cody Gatica) w/ HTN, HLD, HFrEF (EF 30-35%), VT, s/p AICD, AS s/p TAVR, CAD s/p stents, GERD, hypothyroid, anxiety, pancreatic ca s/p whipple 10/26/21, chemo and xrt with non-healing abdominal wound treated w/ HBOT and STSG 7/8/22, s/p repeat STSG wound vac placement on 9/29, PE on Xarelto, and recurrent hospitalizations for bleeding at graft site presents from [facility] with septic shock 2/2 colitis, bacteremia and PE, admitted to MICU for further management, s/p pressors and abx, now transferred to medicine floors. 82 yo M (Alternate MRN [id number] Name: Cody Gatica) w/ HTN, HLD, HFrEF (EF 30-35%), VT, s/p AICD, AS s/p TAVR, CAD s/p stents, GERD, hypothyroid, anxiety, pancreatic ca s/p whipple 10/26/21, chemo and xrt with non-healing abdominal wound treated w/ HBOT and STSG 7/8/22, s/p repeat STSG wound vac placement on 9/29, PE on Xarelto, and recurrent hospitalizations for bleeding at graft site presents from [facility] with septic shock 2/2 colitis, bacteremia and PE, admitted to MICU for further management, s/p pressors and abx, now transferred to medicine floors.

## 2022-12-06 NOTE — PROGRESS NOTE ADULT - PROBLEM SELECTOR PLAN 1
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

## 2022-12-06 NOTE — MEDICAL STUDENT PROGRESS NOTE(EDUCATION) - SUBJECTIVE AND OBJECTIVE BOX
Patient is a 81y old  Male who presents with a chief complaint of Hypotension, bacteremia (05 Dec 2022 21:38)    SUBJECTIVE / OVERNIGHT EVENTS: Overnight patient failed his TOV. At 07:00 this morning, 400ccs removed via straight cath. Subjective limited secondary to patient's mental status changes. Patient states he is doing well. Daughter at bedside notes patient's mentation is still not at baseline and he is fairly sleepy. He has been weak and she is unsure if he was moved out of bed yesterday. She states he is unable to feed himself at this time. Has a very mild cough at times.    ADDITIONAL REVIEW OF SYSTEMS: As per HPI, otherwise negative.    MEDICATIONS  (STANDING):  aMIOdarone    Tablet 200 milliGRAM(s) Oral daily  apixaban 5 milliGRAM(s) Oral two times a day  chlorhexidine 4% Liquid 1 Application(s) Topical <User Schedule>  DULoxetine 60 milliGRAM(s) Oral daily  gabapentin 100 milliGRAM(s) Oral three times a day  lactated ringers. 500 milliLiter(s) (100 mL/Hr) IV Continuous <Continuous>  levothyroxine 25 MICROGram(s) Oral daily  midodrine 5 milliGRAM(s) Oral every 8 hours  pancrelipase  (CREON 24,000 Lipase Units) 3 Capsule(s) Oral three times a day with meals  piperacillin/tazobactam IVPB.. 3.375 Gram(s) IV Intermittent every 8 hours  tamsulosin 0.4 milliGRAM(s) Oral at bedtime    MEDICATIONS  (PRN):  acetaminophen     Tablet .. 650 milliGRAM(s) Oral every 6 hours PRN Mild Pain (1 - 3)  oxyCODONE    IR 5 milliGRAM(s) Oral every 6 hours PRN Severe Pain (7 - 10)      I&O's Summary    05 Dec 2022 07:01  -  06 Dec 2022 07:00  --------------------------------------------------------  IN: 750 mL / OUT: 705 mL / NET: 45 mL    06 Dec 2022 07:01  -  06 Dec 2022 18:06  --------------------------------------------------------  IN: 240 mL / OUT: 400 mL / NET: -160 mL      PHYSICAL EXAM:  Vital Signs Last 24 Hrs  T(C): 36 (06 Dec 2022 12:56), Max: 36.8 (06 Dec 2022 00:40)  T(F): 96.8 (06 Dec 2022 12:56), Max: 98.2 (06 Dec 2022 00:40)  HR: 91 (06 Dec 2022 15:27) (87 - 99)  BP: 108/56 (06 Dec 2022 15:27) (105/57 - 122/58)  BP(mean): 74 (05 Dec 2022 20:00) (74 - 83)  RR: 18 (06 Dec 2022 12:56) (18 - 24)  SpO2: 97% (06 Dec 2022 15:27) (96% - 100%)    Parameters below as of 06 Dec 2022 15:27  Patient On (Oxygen Delivery Method): room air      GENERAL: No acute distress, well-developed  HEAD:  Atraumatic, Normocephalic  EYES: EOMI, PERRLA, conjunctiva and sclera clear  NECK: Supple, no lymphadenopathy, no JVD  CHEST/LUNG: CTAB; No wheezes, rales, or rhonchi  HEART: Regular rate and rhythm; No murmurs, rubs, or gallops  ABDOMEN: Soft, non-tender, non-distended; normal bowel sounds, no organomegaly  EXTREMITIES:  2+ peripheral pulses b/l, No clubbing, cyanosis, or edema  NEUROLOGY: A&O x 3, no focal deficits  SKIN: No rashes or lesions    LABS:                        8.4    4.26  )-----------( 135      ( 06 Dec 2022 16:36 )             25.9     12-06    136  |  106  |  18  ----------------------------<  126<H>  3.7   |  22  |  0.61    Ca    7.8<L>      06 Dec 2022 07:06  Phos  2.4     12-06  Mg     1.5     12-06    TPro  5.1<L>  /  Alb  1.9<L>  /  TBili  1.2  /  DBili  x   /  AST  59<H>  /  ALT  37  /  AlkPhos  223<H>  12-06    PT/INR - ( 06 Dec 2022 07:06 )   PT: 24.6 sec;   INR: 2.12 ratio         PTT - ( 06 Dec 2022 07:06 )  PTT:45.0 sec      RADIOLOGY & ADDITIONAL TESTS: Reviewed in [facility]

## 2022-12-06 NOTE — PROGRESS NOTE ADULT - PROBLEM SELECTOR PLAN 12
Diet:  DVT PPT: Eliquis  Dispo: from [facility], pending clinical improvement Diet:  DVT PPT: Eliquis  Dispo: from [facility], pending clinical improvement    #Urinary retention  -second straight cath had 400cc after bladder scan of above 300s.   -Flomax 0.4mg started on 12/6

## 2022-12-06 NOTE — PHYSICAL THERAPY INITIAL EVALUATION ADULT - PERTINENT HX OF CURRENT PROBLEM, REHAB EVAL
82 yo M (Alternate MRN [id number] Name: Cody Gatica) w/ HTN, HLD, HFrEF (EF 30-35%), VT, s/p AICD, AS s/p TAVR, CAD s/p stents, GERD, hypothyroid, anxiety, pancreatic ca s/p whipple 10/26/21, chemo and xrt with non-healing abdominal wound treated w/ HBOT and STSG 7/8/22, s/p repeat STSG wound vac placement on 9/29, PE on Xarelto, and recurrent hospitalizations for bleeding at graft site presents from [facility] with hypotension, tachycardia, fever, likely i/s/o septic shock, admitted to MICU for further management, since transferred to the floors. 12/2 Chest Xray: Small bilateral pleural effusions. CT Head: 1. No acute intracranial CT abnormality. 2. Correlate clinically for acute left maxillary sinusitis. CT Abd and Pelvis:  Acute pulmonary emboli in the left upper lobe and probably in the lingula. Areas of peritoneal thickening and enhancement, particularly adjacent to the loculated ascites in the left paracolic gutter. Marked circumferential thickening of the cecum and proximal right colon, suspicious for typhlitis. Nonspecific colitis including ischemic etiology not excluded. Thrombosis of the main portal vein likely extends into the right portal vein. 12/3 Xray Foot: Postsurgical changes at the right first ray and left second proximal phalanx, No radiographic evidence for osteomyelitis. 12/6 VA Duplex BLE Scan: No evidence of deep venous thrombosis in either lower extremity.

## 2022-12-06 NOTE — PROGRESS NOTE ADULT - PROBLEM SELECTOR PLAN 5
acute pulmonary emboli in the left upper lobe and possibly in the lingula  - Has hx of portal vein thrombosis; reportedly not on AC outpatient 2/2 recurrent bleeding at nonhealing wound site  - no bleeding since hospitalized here, will start on Eliquis 5 mg BID and monitor for any signs of bleeding  - POCUS w/o RV dilation  - will order VA duplex acute pulmonary emboli in the left upper lobe and possibly in the lingula  - Has hx of portal vein thrombosis; reportedly not on AC outpatient 2/2 recurrent bleeding at nonhealing wound site  - no bleeding since hospitalized here, will start on Eliquis 5 mg BID and monitor for any signs of bleeding  - POCUS w/o RV dilation  - VA duplex 12/06 ---> no DVT  - CBC 1500 for eliquis monitoring given portal vein thrombosis

## 2022-12-06 NOTE — PROGRESS NOTE ADULT - PROBLEM SELECTOR PLAN 2
patient presented w/ septic shock; BCx grew Strep Anginosus and Klebsiella vs colitis on CT  - now improved; off IV pressors  - on Midodrine 10mg TID; BP on the lower end of normal  - will start maintenance fluids 500cc x 5 hours  - wean off midodrine as tolerated  - once BP improved restart b-blocker patient presented w/ septic shock; BCx grew Strep Anginosus and Klebsiella vs colitis on CT  - now improved; off IV pressors  - on Midodrine 10mg TID; BP on the lower end of normal  - will start maintenance fluids 500cc x 5 hours  - wean off midodrine as tolerated --> 5mg   - once BP improved restart b-blocker

## 2022-12-06 NOTE — PROGRESS NOTE ADULT - PROBLEM SELECTOR PLAN 9
On Entresto at home  - on hold due to shock state; BP at goal  - on Midodrine 10mg q8h; wean as tolerated

## 2022-12-06 NOTE — PROGRESS NOTE ADULT - PROBLEM SELECTOR PLAN 3
Encephalopathy like 2/2 to severe sepsis  - Improved, occasional confusion but overall much improved mental status, AOx3 at baseline  - continue abx therapy as above  - CTH was negative

## 2022-12-07 NOTE — PROGRESS NOTE ADULT - PROBLEM SELECTOR PLAN 5
acute pulmonary emboli in the left upper lobe and possibly in the lingula  - Has hx of portal vein thrombosis; reportedly not on AC outpatient 2/2 recurrent bleeding at nonhealing wound site  - no bleeding since hospitalized here, will start on Eliquis 5 mg BID and monitor for any signs of bleeding  - POCUS w/o RV dilation  - VA duplex 12/06 ---> no DVT  - CBC 1500 for eliquis monitoring given portal vein thrombosis --> 8.4 on 12/06 pm acute pulmonary emboli in the left upper lobe and possibly in the lingula  - Has hx of portal vein thrombosis; reportedly not on AC outpatient 2/2 recurrent bleeding at nonhealing wound site  - no bleeding since hospitalized here, will start on Eliquis 5 mg BID and monitor for any signs of bleeding  - POCUS w/o RV dilation  - VA duplex 12/06 ---> no DVT  - CBC 1500 for eliquis monitoring given portal vein thrombosis --> 8.4 on 12/06 pm  - will hold AC for procedures.

## 2022-12-07 NOTE — PROGRESS NOTE ADULT - PROBLEM SELECTOR PLAN 12
Diet:  DVT PPT: Eliquis  Dispo: from [facility], pending clinical improvement    #Urinary retention  -second straight cath had 400cc after bladder scan of above 300s.   -Flomax 0.4mg started on 12/6  -hernandez back in due to failed TOV

## 2022-12-07 NOTE — PROGRESS NOTE ADULT - PROBLEM SELECTOR PLAN 1
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  - CTAP w/ colitis c/w abx  - Known sacral ulcer & abdominal wound, both appear clean  - Urinalysis WNL | Urine cultures NGTD | F/u blood culture (12/2) grew streptococcus, MRSA swab (12/2)  - Continue Zosyn (12/2-)  - can consider CORBIN if culture result is positive for suspected species, TTE performed on 12/4 grossly normal EF 50%  - ID consulted for duration of abx and recommendations regarding CORBIN  - CORBIN on 12/7  - Diagnostic para r/o peritonitis 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   - Diagnostic para r/o peritonitis , US abd done per IR revealed moderate ascites. hold eliquis for para on 12/8. no need for NPO.  abd dressing change is every other day with xeroform at [facility].

## 2022-12-07 NOTE — PROGRESS NOTE ADULT - PROBLEM/PLAN-5
DISPLAY PLAN FREE TEXT
stair mobility/balance training/bed mobility training/gait training/strengthening/transfer training

## 2022-12-07 NOTE — PROGRESS NOTE ADULT - ASSESSMENT
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     #Septic shock from polymicrobial bacteremia suspect GI source/ colitis/ typhlitis   -Now septic shock state resolved  -Continue zosyn  -Repeat blood cultures no growth 12/3  -CORBIN to evaluate for IE given TAVR   -Paracentesis to evaluate for source of bacteremia - possibly from peritonitis  -Monitor fever curve  -Trend WBC    #Abd wound  -Plastics following    #Acute YANG PE  -AC per primary team.    Cristopher Adrian MD  Available through MS Teams  If no response, or after 5pm/weekends, call [phone]

## 2022-12-07 NOTE — CONSULT NOTE ADULT - ASSESSMENT
Interventional Radiology    Evaluate for Procedure: DX and TX paracentesis     HPI: 82 yo M (Alternate MRN [id number] Name: Cody Gatica) w/ HTN, HLD, HFrEF (EF 30-35%), VT, s/p AICD, AS s/p TAVR, CAD s/p stents, GERD, hypothyroid, anxiety, pancreatic ca s/p whipple 10/26/21 now ascites and concern for peritonitis. IR consulted for paracentesis.     Allergies:   Medications (Abx/Cardiac/Anticoagulation/Blood Products)  aMIOdarone    Tablet: 200 milliGRAM(s) Oral (12-07 @ 05:35)  apixaban: 5 milliGRAM(s) Oral (12-07 @ 05:35)  midodrine: 5 milliGRAM(s) Oral (12-07 @ 05:35)  midodrine: 10 milliGRAM(s) Oral (12-06 @ 05:59)  piperacillin/tazobactam IVPB..: 25 mL/Hr IV Intermittent (12-07 @ 05:35)    Data:    T(C): 36.8  HR: 88  BP: 112/55  RR: 18  SpO2: 97%    -WBC 3.76 / HgB 7.9 / Hct 24.7 / Plt 123  -Na 136 / Cl 106 / BUN 15 / Glucose 134  -K 4.2 / CO2 22 / Cr 0.64  -ALT 37 / Alk Phos 266 / T.Bili 1.1  -INR 2.12 / PTT 45.0    Radiology:     Assessment/Plan: 82 yo M (Alternate MRN [id number] Name: Cody Gatica) w/ HTN, HLD, HFrEF (EF 30-35%), VT, s/p AICD, AS s/p TAVR, CAD s/p stents, GERD, hypothyroid, anxiety, pancreatic ca s/p whipple 10/26/21 now ascites and concern for peritonitis. IR consulted for paracentesis.     - Will plan for paracentesis on 12/8. Please hold Eliquis for 24hrs prior to procedure.   - Place order under [facility].  - NEED COVID TEST WITHIN  5 DAYS OF PLANNED PROCEDURE.  - Pt  Doesn't need to be NPO.  - Needs STAT CBC, BMP, Coags in AM.   Interventional Radiology    Evaluate for Procedure: DX and TX paracentesis     HPI: 82 yo M (Alternate MRN [id number] Name: Cody Gatica) w/ HTN, HLD, HFrEF (EF 30-35%), VT, s/p AICD, AS s/p TAVR, CAD s/p stents, GERD, hypothyroid, anxiety, pancreatic ca s/p whipple 10/26/21 now ascites and concern for peritonitis. IR consulted for paracentesis.     Allergies:   Medications (Abx/Cardiac/Anticoagulation/Blood Products)  aMIOdarone    Tablet: 200 milliGRAM(s) Oral (12-07 @ 05:35)  apixaban: 5 milliGRAM(s) Oral (12-07 @ 05:35)  midodrine: 5 milliGRAM(s) Oral (12-07 @ 05:35)  midodrine: 10 milliGRAM(s) Oral (12-06 @ 05:59)  piperacillin/tazobactam IVPB..: 25 mL/Hr IV Intermittent (12-07 @ 05:35)    Data:    T(C): 36.8  HR: 88  BP: 112/55  RR: 18  SpO2: 97%    -WBC 3.76 / HgB 7.9 / Hct 24.7 / Plt 123  -Na 136 / Cl 106 / BUN 15 / Glucose 134  -K 4.2 / CO2 22 / Cr 0.64  -ALT 37 / Alk Phos 266 / T.Bili 1.1  -INR 2.12 / PTT 45.0    Radiology:     Assessment/Plan: 82 yo M (Alternate MRN [id number] Name: Cody Gatica) w/ HTN, HLD, HFrEF (EF 30-35%), VT, s/p AICD, AS s/p TAVR, CAD s/p stents, GERD, hypothyroid, anxiety, pancreatic ca s/p whipple 10/26/21 now ascites and concern for peritonitis. IR consulted for paracentesis.     - Will plan for paracentesis on 12/8. Please hold Eliquis for 24hrs prior to procedure.   - Place order under [facility].  - Goal INR < 2.5.  - NEED COVID TEST WITHIN  5 DAYS OF PLANNED PROCEDURE.  - Pt  Doesn't need to be NPO.  - Needs STAT CBC, BMP, Coags in AM.

## 2022-12-07 NOTE — PROGRESS NOTE ADULT - ATTENDING COMMENTS
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     #Bacteremia  #Sepsis  #PE  #CHF  #Metabolic encephalopathy  #Abdominal wound  #Pancreatic ca    - Patient on zosyn per ID, sensitivities pending  - s/p CORBIN today to r/o endocarditis, no vegetations seen. Course complicated by acute hypoxia and hypotension. RRT was called. Patient was placed on bipap however remained hypoxic. Blood pressures then dropped to SBP of 40s manually and eventually started on pressors (inititially started on negro however no improvement in bp and then started on vaso and levo). Patient's mental status worsened and patient was intubated. MICU was called and patient was transferred to MICU. 40 minutes spent at RRT.  - ID consulted, continue zoysn for now, f/u sensitivities  - TTE to r/o endocarditis  - IR consult for para to r/o source of infection  - repeat blood cultures pending  - started on AC for PE, held this am for CORBIN  - failed TOV, hernandez placed  - wound care and plastic surgery for abdominal wound

## 2022-12-07 NOTE — PROGRESS NOTE ADULT - SUBJECTIVE AND OBJECTIVE BOX
PROGRESS NOTE:   Authored by Dr. John Paulino  Patient is a 81y old  Male who presents with a chief complaint of Hypotension (06 Dec 2022 13:45)      SUBJECTIVE / OVERNIGHT EVENTS:    ADDITIONAL REVIEW OF SYSTEMS:  Review of Systems:  Constitutional: No fever, No weight loss, good appetite/po intake  Head: No headache   Eyes: No blurry vision, No diplopia  Neuro: No tremors, No muscle weakness   Cardiovascular: No chest pain, No palpitations  Respiratory: No SOB, No cough  GI: No nausea, No vomiting, No diarrhea  : No dysuria, No hematuria  Skin: No rash  MSK: No joint pain   Psych: No depression  Heme: No abnormal bruising, no abnormal bleeding    MEDICATIONS  (STANDING):  aMIOdarone    Tablet 200 milliGRAM(s) Oral daily  apixaban 5 milliGRAM(s) Oral two times a day  chlorhexidine 4% Liquid 1 Application(s) Topical <User Schedule>  DULoxetine 60 milliGRAM(s) Oral daily  gabapentin 100 milliGRAM(s) Oral three times a day  lactated ringers. 500 milliLiter(s) (100 mL/Hr) IV Continuous <Continuous>  levothyroxine 25 MICROGram(s) Oral daily  midodrine 5 milliGRAM(s) Oral every 8 hours  pancrelipase  (CREON 24,000 Lipase Units) 3 Capsule(s) Oral three times a day with meals  piperacillin/tazobactam IVPB.. 3.375 Gram(s) IV Intermittent every 8 hours  tamsulosin 0.4 milliGRAM(s) Oral at bedtime    MEDICATIONS  (PRN):  acetaminophen     Tablet .. 650 milliGRAM(s) Oral every 6 hours PRN Mild Pain (1 - 3)  guaiFENesin Oral Liquid (Sugar-Free) 100 milliGRAM(s) Oral every 6 hours PRN Cough  oxyCODONE    IR 5 milliGRAM(s) Oral every 6 hours PRN Severe Pain (7 - 10)      CAPILLARY BLOOD GLUCOSE        I&O's Summary    06 Dec 2022 07:01  -  07 Dec 2022 07:00  --------------------------------------------------------  IN: 240 mL / OUT: 400 mL / NET: -160 mL        PHYSICAL EXAM:  Vital Signs Last 24 Hrs  T(C): 36.8 (07 Dec 2022 04:34), Max: 36.8 (07 Dec 2022 04:34)  T(F): 98.2 (07 Dec 2022 04:34), Max: 98.2 (07 Dec 2022 04:34)  HR: 88 (07 Dec 2022 04:34) (83 - 91)  BP: 112/55 (07 Dec 2022 04:34) (105/57 - 112/55)  BP(mean): --  RR: 18 (07 Dec 2022 04:34) (18 - 18)  SpO2: 97% (07 Dec 2022 04:34) (96% - 97%)    Parameters below as of 07 Dec 2022 04:34  Patient On (Oxygen Delivery Method): room air        GENERAL: NAD, lying comfortably in bed  HEAD: Atraumatic, normocephalic  EYES: EOMI b/l PERRLA b/l, conjunctiva and sclera clear  NECK: Supple, No JVD, No LAD  RESPIRATORY: Normal respiratory effort; lungs are clear to auscultation bilaterally  CARDIOVASCULAR: Regular rate and rhythm, normal S1 and S2, no murmur/rub/gallop; No lower extremity edema  ABDOMEN: Nontender, normoactive bowel sounds, no rebound/guarding; No hepatosplenomegaly  MUSCULOSKELETAL: no clubbing or cyanosis of digits; no joint swelling or tenderness to palpation  NEURO: Non focal   PSYCH: A+O to person, place, and time; affect appropriate     PROGRESS NOTE:   Authored by Dr. John Paulino  Patient is a 81y old  Male who presents with a chief complaint of Hypotension (06 Dec 2022 13:45)      SUBJECTIVE / OVERNIGHT EVENTS:  had abd pain overnight, and congested as well, got oxy for pain, and no other complains.     ADDITIONAL REVIEW OF SYSTEMS:  Review of Systems:  limited due to ams , ao *1 to 2     MEDICATIONS  (STANDING):  aMIOdarone    Tablet 200 milliGRAM(s) Oral daily  apixaban 5 milliGRAM(s) Oral two times a day  chlorhexidine 4% Liquid 1 Application(s) Topical <User Schedule>  DULoxetine 60 milliGRAM(s) Oral daily  gabapentin 100 milliGRAM(s) Oral three times a day  lactated ringers. 500 milliLiter(s) (100 mL/Hr) IV Continuous <Continuous>  levothyroxine 25 MICROGram(s) Oral daily  midodrine 5 milliGRAM(s) Oral every 8 hours  pancrelipase  (CREON 24,000 Lipase Units) 3 Capsule(s) Oral three times a day with meals  piperacillin/tazobactam IVPB.. 3.375 Gram(s) IV Intermittent every 8 hours  tamsulosin 0.4 milliGRAM(s) Oral at bedtime    MEDICATIONS  (PRN):  acetaminophen     Tablet .. 650 milliGRAM(s) Oral every 6 hours PRN Mild Pain (1 - 3)  guaiFENesin Oral Liquid (Sugar-Free) 100 milliGRAM(s) Oral every 6 hours PRN Cough  oxyCODONE    IR 5 milliGRAM(s) Oral every 6 hours PRN Severe Pain (7 - 10)      CAPILLARY BLOOD GLUCOSE        I&O's Summary    06 Dec 2022 07:01  -  07 Dec 2022 07:00  --------------------------------------------------------  IN: 240 mL / OUT: 400 mL / NET: -160 mL        PHYSICAL EXAM:  Vital Signs Last 24 Hrs  T(C): 36.8 (07 Dec 2022 04:34), Max: 36.8 (07 Dec 2022 04:34)  T(F): 98.2 (07 Dec 2022 04:34), Max: 98.2 (07 Dec 2022 04:34)  HR: 88 (07 Dec 2022 04:34) (83 - 91)  BP: 112/55 (07 Dec 2022 04:34) (105/57 - 112/55)  BP(mean): --  RR: 18 (07 Dec 2022 04:34) (18 - 18)  SpO2: 97% (07 Dec 2022 04:34) (96% - 97%)    Parameters below as of 07 Dec 2022 04:34  Patient On (Oxygen Delivery Method): room air        GENERAL: NAD, lying comfortably in bed  HEAD: Atraumatic, normocephalic  EYES: EOMI b/l PERRLA b/l, conjunctiva and sclera clear  NECK: Supple, No JVD, No LAD  RESPIRATORY: Normal respiratory effort; lungs are clear to auscultation bilaterally  CARDIOVASCULAR: Regular rate and rhythm, normal S1 and S2, no murmur/rub/gallop; No lower extremity edema  ABDOMEN: Nontender, normoactive bowel sounds, no rebound/guarding; No hepatosplenomegaly  MUSCULOSKELETAL: no clubbing or cyanosis of digits; no joint swelling or tenderness to palpation  NEURO: Non focal   PSYCH: A+O to person, place, and time; affect appropriate        LABS:                        9.1    3.74  )-----------( 143      ( 07 Dec 2022 15:38 )             29.2     12-07    141  |  107  |  15  ----------------------------<  137<H>  4.1   |  19<L>  |  0.75    Ca    7.3<L>      07 Dec 2022 15:38  Phos  3.6     12-07  Mg     1.5     12-07    TPro  5.3<L>  /  Alb  1.8<L>  /  TBili  1.4<H>  /  DBili  x   /  AST  97<H>  /  ALT  39  /  AlkPhos  305<H>  12-07    PT/INR - ( 07 Dec 2022 15:38 )   PT: 35.7 sec;   INR: 3.04 ratio         PTT - ( 07 Dec 2022 15:38 )  PTT:37.2 sec          Culture - Blood (collected 06 Dec 2022 08:07)  Source: .Blood Blood-Peripheral  Preliminary Report (07 Dec 2022 13:02):    No growth to date.    Culture - Blood (collected 06 Dec 2022 08:07)  Source: .Blood Blood-Peripheral  Preliminary Report (07 Dec 2022 14:02):    No growth to date.

## 2022-12-07 NOTE — CHART NOTE - NSCHARTNOTEFT_GEN_A_CORE
MICU Accept Note    CHIEF COMPLAINT: Hypotension    HPI / INTERVAL HISTORY:     80 yo M (Alternate MRN [id number] Name: Cody Gatica) w/ HTN, HLD, HFrEF (EF 30-35%), VT, s/p AICD, AS s/p TAVR, CAD s/p stents, GERD, hypothyroid, anxiety, pancreatic ca s/p whipple 10/26/21, chemo and xrt with non-healing abdominal wound treated w/ HBOT and STSG 7/8/22, s/p repeat STSG wound vac placement on 9/29, PE on Xarelto, and recurrent hospitalizations for bleeding at graft site presents from [facility] with hypotension, tachycardia, fever, likely i/s/o septic shock, admitted to MICU for further management.    Blood cultures collected on 12/2 growing Strep anginosus and Klebsiella treated with zosyn; repeat blood cultures on 12/3 NGTD. MRSA negative s/p vanco x1. Initially required levophed for BP support but weaned off successfully and transferred to floors on midodrine 10 q8. Patient also incidentally found to have acute pulmonary emboli in the left upper lobe and possibly in the lingula; bedside POCUS without evidence of RV strain. AC was held after discussion due to chronically oozing abdominal wound; AC also held outpatient for history for portal vein thrombosis. Transferred to floors on prophylactic dose heparin. Concurrently found to have typhlitis and received treatment with zosyn.         PAST MEDICAL & SURGICAL HISTORY:  Hypertension      Hyperlipidemia      HFrEF (heart failure with reduced ejection fraction)      AICD (automatic cardioverter/defibrillator) present      Aortic stenosis      Pancreatic cancer      Portal vein thrombosis      Pulmonary embolus      Nonhealing surgical wound      H/O Whipple procedure  2021, c/b nonhealing wound      Status post skin graft using Integra wound dressing      S/P TAVR (transcatheter aortic valve replacement)          FAMILY HISTORY:      SOCIAL HISTORY:  Smoking:   Substance Use:   EtOH Use:   Marital Status:   Sexual History:   Occupation:  Recent Travel:  Country of Birth:   Advance Directives:     HOME MEDICATIONS:      Allergies    No Known Allergies    Intolerances          REVIEW OF SYSTEMS:  Constitutional: No fevers, chills, weight loss, weight gain  HEENT: No vision problems, eye pain, nasal congestion, rhinorrhea, sore throat, dysphagia  CV: No chest pain, orthopnea, palpitations  Resp: No cough, dyspnea, wheezing, hemoptysis  GI: No nausea, vomiting, diarrhea, constipation, abdominal pain  : [ ] dysuria [ ] nocturia [ ] hematuria [ ] increased urinary frequency  Musculoskeletal: [ ] back pain [ ] myalgias [ ] arthralgias [ ] fracture  Skin: [ ] rash [ ] itch  Neurological: [ ] headache [ ] dizziness [ ] syncope [ ] weakness [ ] numbness  Psychiatric: [ ] anxiety [ ] depression  Endocrine: [ ] diabetes [ ] thyroid problem  Hematologic/Lymphatic: [ ] anemia [ ] bleeding problem  Allergic/Immunologic: [ ] itchy eyes [ ] nasal discharge [ ] hives [ ] angioedema  [ ] All other systems negative  [ ] Unable to assess ROS because ________    OBJECTIVE:  ICU Vital Signs Last 24 Hrs  T(C): 36.4 (07 Dec 2022 13:05), Max: 36.8 (07 Dec 2022 04:34)  T(F): 97.6 (07 Dec 2022 13:05), Max: 98.2 (07 Dec 2022 04:34)  HR: 107 (07 Dec 2022 14:07) (83 - 121)  BP: 85/52 (07 Dec 2022 14:07) (48/24 - 205/120)  BP(mean): 74 (07 Dec 2022 11:55) (74 - 74)  ABP: --  ABP(mean): --  RR: 22 (07 Dec 2022 14:07) (18 - 22)  SpO2: 91% (07 Dec 2022 13:15) (87% - 97%)    O2 Parameters below as of 07 Dec 2022 13:15  Patient On (Oxygen Delivery Method): mask, nonrebreather              12-06 @ 07:01  -  12-07 @ 07:00  --------------------------------------------------------  IN: 240 mL / OUT: 1105 mL / NET: -865 mL      CAPILLARY BLOOD GLUCOSE      POCT Blood Glucose.: 120 mg/dL (07 Dec 2022 13:58)      PHYSICAL EXAM:  General:   HEENT:   Neck:   Chest/Lungs:  Heart:  Abdomen:   Extremities:   Skin:   Neuro:   Psych:     LINES:     HOSPITAL MEDICATIONS:  MEDICATIONS  (STANDING):  aMIOdarone    Tablet 200 milliGRAM(s) Oral daily  chlorhexidine 4% Liquid 1 Application(s) Topical <User Schedule>  DULoxetine 60 milliGRAM(s) Oral daily  gabapentin 100 milliGRAM(s) Oral three times a day  lactated ringers. 500 milliLiter(s) (100 mL/Hr) IV Continuous <Continuous>  lactated ringers. 1000 milliLiter(s) (75 mL/Hr) IV Continuous <Continuous>  levothyroxine 25 MICROGram(s) Oral daily  midodrine 5 milliGRAM(s) Oral every 8 hours  pancrelipase  (CREON 24,000 Lipase Units) 3 Capsule(s) Oral three times a day with meals  phytonadione   Solution 2.5 milliGRAM(s) Oral once  piperacillin/tazobactam IVPB.. 3.375 Gram(s) IV Intermittent every 8 hours  propofol Infusion 10 MICROgram(s)/kG/Min (5.37 mL/Hr) IV Continuous <Continuous>  tamsulosin 0.4 milliGRAM(s) Oral at bedtime  vasopressin Infusion 0.03 Unit(s)/Min (4.5 mL/Hr) IV Continuous <Continuous>  vasopressin Infusion 0.04 Unit(s)/Min (6 mL/Hr) IV Continuous <Continuous>    MEDICATIONS  (PRN):  acetaminophen     Tablet .. 650 milliGRAM(s) Oral every 6 hours PRN Mild Pain (1 - 3)  guaiFENesin Oral Liquid (Sugar-Free) 100 milliGRAM(s) Oral every 6 hours PRN Cough  oxyCODONE    IR 5 milliGRAM(s) Oral every 6 hours PRN Severe Pain (7 - 10)      LABS:                        8.4    5.85  )-----------( 159      ( 07 Dec 2022 14:15 )             26.4     Hgb Trend: 8.4<--, 7.9<--, 8.4<--, 7.3<--, 7.5<--  12-07    136  |  106  |  15  ----------------------------<  134<H>  4.2   |  22  |  0.64    Ca    7.9<L>      07 Dec 2022 07:11  Phos  2.9     12-07  Mg     1.6     12-07    TPro  5.5<L>  /  Alb  2.1<L>  /  TBili  1.1  /  DBili  x   /  AST  65<H>  /  ALT  37  /  AlkPhos  266<H>  12-07    Creatinine Trend: 0.64<--, 0.61<--, 0.70<--, 0.87<--, 0.91<--, 0.89<--  PT/INR - ( 07 Dec 2022 11:11 )   PT: 32.7 sec;   INR: 2.79 ratio         PTT - ( 06 Dec 2022 07:06 )  PTT:45.0 sec    Arterial Blood Gas:  12-07 @ 14:08  7.24/41/31/18/45.4/-9.1  ABG lactate: --  Arterial Blood Gas:  12-07 @ 13:41  7.37/33/57/19/87.7/-6  ABG lactate: --        MICROBIOLOGY:     RADIOLOGY & ADDITIONAL TESTS:      ASSESSMENT AND PLAN:  Mr./Mrs./Ms. is a ___    #Neuro    #Cardiovascular    #Respiratory    #GI/Nutrition    #/Renal    #Skin    #ID    #Endocrine    #Hematologic/DVT ppx    #Ethics MICU Accept Note    CHIEF COMPLAINT: Hypotension    HPI / INTERVAL HISTORY:     80 yo M (Alternate MRN [id number] Name: Cody Gatica) w/ HTN, HLD, HFrEF (EF 30-35%), VT, s/p AICD, AS s/p TAVR, CAD s/p stents, GERD, hypothyroid, anxiety, pancreatic ca s/p whipple 10/26/21, chemo and xrt with non-healing abdominal wound treated w/ HBOT and STSG 7/8/22, s/p repeat STSG wound vac placement on 9/29, PE on Xarelto, and recurrent hospitalizations for bleeding at graft site presents from [facility] with hypotension, tachycardia, fever, likely i/s/o septic shock, admitted to MICU for further management.    Blood cultures collected on 12/2 growing Strep anginosus and Klebsiella treated with zosyn; repeat blood cultures on 12/3 NGTD. MRSA negative s/p vanco x1. Initially required levophed for BP support but weaned off successfully and transferred to floors on midodrine 10 q8. Patient also incidentally found to have acute pulmonary emboli in the left upper lobe and possibly in the lingula; bedside POCUS without evidence of RV strain. AC was held after discussion due to chronically oozing abdominal wound; AC also held outpatient for history for portal vein thrombosis. Transferred to floors on prophylactic dose heparin. Concurrently found to have typhlitis and received treatment with zosyn.     On 12/7 RRT called for respiratory distress. Pt became hypotensive and was started on three pressors. Transferred to MICU for pressure support     PAST MEDICAL & SURGICAL HISTORY:  Hypertension      Hyperlipidemia      HFrEF (heart failure with reduced ejection fraction)      AICD (automatic cardioverter/defibrillator) present      Aortic stenosis      Pancreatic cancer      Portal vein thrombosis      Pulmonary embolus      Nonhealing surgical wound      H/O Whipple procedure  2021, c/b nonhealing wound      Status post skin graft using Integra wound dressing      S/P TAVR (transcatheter aortic valve replacement)          FAMILY HISTORY:      SOCIAL HISTORY:  Smoking:   Substance Use:   EtOH Use:   Marital Status:   Sexual History:   Occupation:  Recent Travel:  Country of Birth:   Advance Directives:     HOME MEDICATIONS:      Allergies    No Known Allergies    Intolerances          REVIEW OF SYSTEMS:  Constitutional: No fevers, chills, weight loss, weight gain  HEENT: No vision problems, eye pain, nasal congestion, rhinorrhea, sore throat, dysphagia  CV: No chest pain, orthopnea, palpitations  Resp: No cough, dyspnea, wheezing, hemoptysis  GI: No nausea, vomiting, diarrhea, constipation, abdominal pain  : [ ] dysuria [ ] nocturia [ ] hematuria [ ] increased urinary frequency  Musculoskeletal: [ ] back pain [ ] myalgias [ ] arthralgias [ ] fracture  Skin: [ ] rash [ ] itch  Neurological: [ ] headache [ ] dizziness [ ] syncope [ ] weakness [ ] numbness  Psychiatric: [ ] anxiety [ ] depression  Endocrine: [ ] diabetes [ ] thyroid problem  Hematologic/Lymphatic: [ ] anemia [ ] bleeding problem  Allergic/Immunologic: [ ] itchy eyes [ ] nasal discharge [ ] hives [ ] angioedema  [ ] All other systems negative  [ ] Unable to assess ROS because ________    OBJECTIVE:  ICU Vital Signs Last 24 Hrs  T(C): 36.4 (07 Dec 2022 13:05), Max: 36.8 (07 Dec 2022 04:34)  T(F): 97.6 (07 Dec 2022 13:05), Max: 98.2 (07 Dec 2022 04:34)  HR: 107 (07 Dec 2022 14:07) (83 - 121)  BP: 85/52 (07 Dec 2022 14:07) (48/24 - 205/120)  BP(mean): 74 (07 Dec 2022 11:55) (74 - 74)  ABP: --  ABP(mean): --  RR: 22 (07 Dec 2022 14:07) (18 - 22)  SpO2: 91% (07 Dec 2022 13:15) (87% - 97%)    O2 Parameters below as of 07 Dec 2022 13:15  Patient On (Oxygen Delivery Method): mask, nonrebreather              12-06 @ 07:01  -  12-07 @ 07:00  --------------------------------------------------------  IN: 240 mL / OUT: 1105 mL / NET: -865 mL      CAPILLARY BLOOD GLUCOSE      POCT Blood Glucose.: 120 mg/dL (07 Dec 2022 13:58)      PHYSICAL EXAM:  General:   HEENT:   Neck:   Chest/Lungs:  Heart:  Abdomen:   Extremities:   Skin:   Neuro:   Psych:     LINES:     HOSPITAL MEDICATIONS:  MEDICATIONS  (STANDING):  aMIOdarone    Tablet 200 milliGRAM(s) Oral daily  chlorhexidine 4% Liquid 1 Application(s) Topical <User Schedule>  DULoxetine 60 milliGRAM(s) Oral daily  gabapentin 100 milliGRAM(s) Oral three times a day  lactated ringers. 500 milliLiter(s) (100 mL/Hr) IV Continuous <Continuous>  lactated ringers. 1000 milliLiter(s) (75 mL/Hr) IV Continuous <Continuous>  levothyroxine 25 MICROGram(s) Oral daily  midodrine 5 milliGRAM(s) Oral every 8 hours  pancrelipase  (CREON 24,000 Lipase Units) 3 Capsule(s) Oral three times a day with meals  phytonadione   Solution 2.5 milliGRAM(s) Oral once  piperacillin/tazobactam IVPB.. 3.375 Gram(s) IV Intermittent every 8 hours  propofol Infusion 10 MICROgram(s)/kG/Min (5.37 mL/Hr) IV Continuous <Continuous>  tamsulosin 0.4 milliGRAM(s) Oral at bedtime  vasopressin Infusion 0.03 Unit(s)/Min (4.5 mL/Hr) IV Continuous <Continuous>  vasopressin Infusion 0.04 Unit(s)/Min (6 mL/Hr) IV Continuous <Continuous>    MEDICATIONS  (PRN):  acetaminophen     Tablet .. 650 milliGRAM(s) Oral every 6 hours PRN Mild Pain (1 - 3)  guaiFENesin Oral Liquid (Sugar-Free) 100 milliGRAM(s) Oral every 6 hours PRN Cough  oxyCODONE    IR 5 milliGRAM(s) Oral every 6 hours PRN Severe Pain (7 - 10)      LABS:                        8.4    5.85  )-----------( 159      ( 07 Dec 2022 14:15 )             26.4     Hgb Trend: 8.4<--, 7.9<--, 8.4<--, 7.3<--, 7.5<--  12-07    136  |  106  |  15  ----------------------------<  134<H>  4.2   |  22  |  0.64    Ca    7.9<L>      07 Dec 2022 07:11  Phos  2.9     12-07  Mg     1.6     12-07    TPro  5.5<L>  /  Alb  2.1<L>  /  TBili  1.1  /  DBili  x   /  AST  65<H>  /  ALT  37  /  AlkPhos  266<H>  12-07    Creatinine Trend: 0.64<--, 0.61<--, 0.70<--, 0.87<--, 0.91<--, 0.89<--  PT/INR - ( 07 Dec 2022 11:11 )   PT: 32.7 sec;   INR: 2.79 ratio         PTT - ( 06 Dec 2022 07:06 )  PTT:45.0 sec    Arterial Blood Gas:  12-07 @ 14:08  7.24/41/31/18/45.4/-9.1  ABG lactate: --  Arterial Blood Gas:  12-07 @ 13:41  7.37/33/57/19/87.7/-6  ABG lactate: --        MICROBIOLOGY:     RADIOLOGY & ADDITIONAL TESTS:      ASSESSMENT AND PLAN:  Mr./Mrs./Ms. is a ___    #Neuro    #Cardiovascular    #Respiratory    #GI/Nutrition    #/Renal    #Skin    #ID    #Endocrine    #Hematologic/DVT ppx    #Ethics MICU Accept Note    CHIEF COMPLAINT: Hypotension    HPI / INTERVAL HISTORY:     80 yo M (Alternate MRN [id number] Name: Cody Gatica) w/ HTN, HLD, HFrEF (EF 30-35%), VT, s/p AICD, AS s/p TAVR, CAD s/p stents, GERD, hypothyroid, anxiety, pancreatic ca s/p whipple 10/26/21, chemo and xrt with non-healing abdominal wound treated w/ HBOT and STSG 7/8/22, s/p repeat STSG wound vac placement on 9/29, PE on Xarelto, and recurrent hospitalizations for bleeding at graft site presents from [facility] with hypotension, tachycardia, fever, likely i/s/o septic shock, admitted to MICU for further management.    Blood cultures collected on 12/2 growing Strep anginosus and Klebsiella treated with zosyn; repeat blood cultures on 12/3 NGTD. MRSA negative s/p vanco x1. Initially required levophed for BP support but weaned off successfully and transferred to floors on midodrine 10 q8. Patient also incidentally found to have acute pulmonary emboli in the left upper lobe and possibly in the lingula; bedside POCUS without evidence of RV strain. AC was held after discussion due to chronically oozing abdominal wound; AC also held outpatient for history for portal vein thrombosis. Transferred to floors on prophylactic dose heparin. Concurrently found to have typhlitis and received treatment with zosyn.     On 12/7 RRT called for respiratory distress. Pt sent for CORBIN to rule out endocarditis. When procedure was completed, pt became hypoxic and hypotensive. In the setting of hypotension, pt was started on vaso, love, and negro. An A line was placed and pt was also intubated. Pt is accepted to MICU for pressure support and further management.      CXR needed. Unknown why     PAST MEDICAL & SURGICAL HISTORY:  Hypertension      Hyperlipidemia      HFrEF (heart failure with reduced ejection fraction)      AICD (automatic cardioverter/defibrillator) present      Aortic stenosis      Pancreatic cancer      Portal vein thrombosis      Pulmonary embolus      Nonhealing surgical wound      H/O Whipple procedure  2021, c/b nonhealing wound      Status post skin graft using Integra wound dressing      S/P TAVR (transcatheter aortic valve replacement)          FAMILY HISTORY:      SOCIAL HISTORY:  Smoking:   Substance Use:   EtOH Use:   Marital Status:   Sexual History:   Occupation:  Recent Travel:  Country of Birth:   Advance Directives:     HOME MEDICATIONS:      Allergies    No Known Allergies    Intolerances          REVIEW OF SYSTEMS:  Constitutional: No fevers, chills, weight loss, weight gain  HEENT: No vision problems, eye pain, nasal congestion, rhinorrhea, sore throat, dysphagia  CV: No chest pain, orthopnea, palpitations  Resp: No cough, dyspnea, wheezing, hemoptysis  GI: No nausea, vomiting, diarrhea, constipation, abdominal pain  : [ ] dysuria [ ] nocturia [ ] hematuria [ ] increased urinary frequency  Musculoskeletal: [ ] back pain [ ] myalgias [ ] arthralgias [ ] fracture  Skin: [ ] rash [ ] itch  Neurological: [ ] headache [ ] dizziness [ ] syncope [ ] weakness [ ] numbness  Psychiatric: [ ] anxiety [ ] depression  Endocrine: [ ] diabetes [ ] thyroid problem  Hematologic/Lymphatic: [ ] anemia [ ] bleeding problem  Allergic/Immunologic: [ ] itchy eyes [ ] nasal discharge [ ] hives [ ] angioedema  [ ] All other systems negative  [] Unable to assess ROS because pt ____    OBJECTIVE:  ICU Vital Signs Last 24 Hrs  T(C): 36.4 (07 Dec 2022 13:05), Max: 36.8 (07 Dec 2022 04:34)  T(F): 97.6 (07 Dec 2022 13:05), Max: 98.2 (07 Dec 2022 04:34)  HR: 107 (07 Dec 2022 14:07) (83 - 121)  BP: 85/52 (07 Dec 2022 14:07) (48/24 - 205/120)  BP(mean): 74 (07 Dec 2022 11:55) (74 - 74)  ABP: --  ABP(mean): --  RR: 22 (07 Dec 2022 14:07) (18 - 22)  SpO2: 91% (07 Dec 2022 13:15) (87% - 97%)    O2 Parameters below as of 07 Dec 2022 13:15  Patient On (Oxygen Delivery Method): mask, nonrebreather              12-06 @ 07:01  -  12-07 @ 07:00  --------------------------------------------------------  IN: 240 mL / OUT: 1105 mL / NET: -865 mL      CAPILLARY BLOOD GLUCOSE      POCT Blood Glucose.: 120 mg/dL (07 Dec 2022 13:58)      PHYSICAL EXAM:  GENERAL: NAD, lying comfortably in bed  HEAD: Atraumatic, normocephalic  EYES: EOMI b/l PERRLA b/l, conjunctiva and sclera clear  NECK: Supple, No JVD, No LAD  RESPIRATORY: Normal respiratory effort; lungs are clear to auscultation bilaterally  CARDIOVASCULAR: Regular rate and rhythm, normal S1 and S2, no murmur/rub/gallop; No lower extremity edema  ABDOMEN: Nontender, normoactive bowel sounds, no rebound/guarding; No hepatosplenomegaly  MUSCULOSKELETAL: no clubbing or cyanosis of digits; no joint swelling or tenderness to palpation  NEURO: Non focal   PSYCH: A+O to person, place, and time; affect appropriate    LINES: A line    HOSPITAL MEDICATIONS:  MEDICATIONS  (STANDING):  aMIOdarone    Tablet 200 milliGRAM(s) Oral daily  chlorhexidine 4% Liquid 1 Application(s) Topical <User Schedule>  DULoxetine 60 milliGRAM(s) Oral daily  gabapentin 100 milliGRAM(s) Oral three times a day  lactated ringers. 500 milliLiter(s) (100 mL/Hr) IV Continuous <Continuous>  lactated ringers. 1000 milliLiter(s) (75 mL/Hr) IV Continuous <Continuous>  levothyroxine 25 MICROGram(s) Oral daily  midodrine 5 milliGRAM(s) Oral every 8 hours  pancrelipase  (CREON 24,000 Lipase Units) 3 Capsule(s) Oral three times a day with meals  phytonadione   Solution 2.5 milliGRAM(s) Oral once  piperacillin/tazobactam IVPB.. 3.375 Gram(s) IV Intermittent every 8 hours  propofol Infusion 10 MICROgram(s)/kG/Min (5.37 mL/Hr) IV Continuous <Continuous>  tamsulosin 0.4 milliGRAM(s) Oral at bedtime  vasopressin Infusion 0.03 Unit(s)/Min (4.5 mL/Hr) IV Continuous <Continuous>  vasopressin Infusion 0.04 Unit(s)/Min (6 mL/Hr) IV Continuous <Continuous>    MEDICATIONS  (PRN):  acetaminophen     Tablet .. 650 milliGRAM(s) Oral every 6 hours PRN Mild Pain (1 - 3)  guaiFENesin Oral Liquid (Sugar-Free) 100 milliGRAM(s) Oral every 6 hours PRN Cough  oxyCODONE    IR 5 milliGRAM(s) Oral every 6 hours PRN Severe Pain (7 - 10)      LABS:                        8.4    5.85  )-----------( 159      ( 07 Dec 2022 14:15 )             26.4     Hgb Trend: 8.4<--, 7.9<--, 8.4<--, 7.3<--, 7.5<--  12-07    136  |  106  |  15  ----------------------------<  134<H>  4.2   |  22  |  0.64    Ca    7.9<L>      07 Dec 2022 07:11  Phos  2.9     12-07  Mg     1.6     12-07    TPro  5.5<L>  /  Alb  2.1<L>  /  TBili  1.1  /  DBili  x   /  AST  65<H>  /  ALT  37  /  AlkPhos  266<H>  12-07    Creatinine Trend: 0.64<--, 0.61<--, 0.70<--, 0.87<--, 0.91<--, 0.89<--  PT/INR - ( 07 Dec 2022 11:11 )   PT: 32.7 sec;   INR: 2.79 ratio         PTT - ( 06 Dec 2022 07:06 )  PTT:45.0 sec    Arterial Blood Gas:  12-07 @ 14:08  7.24/41/31/18/45.4/-9.1  ABG lactate: --  Arterial Blood Gas:  12-07 @ 13:41  7.37/33/57/19/87.7/-6  ABG lactate: --        MICROBIOLOGY:     RADIOLOGY & ADDITIONAL TESTS:      ASSESSMENT AND PLAN:  Mr. Gatica is a 80 yo M (Alternate MRN [id number] Name: Cody Gatica) w/ HTN, HLD, HFrEF (EF 30-35%), VT, s/p AICD, AS s/p TAVR, CAD s/p stents, GERD, hypothyroid, anxiety, pancreatic ca s/p whipple 10/26/21, chemo and xrt with non-healing abdominal wound treated w/ HBOT and STSG 7/8/22, s/p repeat STSG wound vac placement on 9/29, PE on Xarelto, and recurrent hospitalizations for bleeding at graft site presents from [facility] with hypotension and respiratory distress re-admitted to MICU for further management.    Neuro  AOx2-3 at baseline    #Neuropathy  - Holding home duloxetine/gabapentin for now while NPO  - On home oxycodone q6hr prn pain for diffuse pain    #Anxiety  - Home Xanax 0.25mg qhs i/s/o agitation; consider resuming if needed for agitation    Cardiovascular  #HFrEF s/p AICD  - Holding home Entresto, Toprol i/s/o shock state  - Holding home amiodarone for now  - Euvolemic on exam, CTM  - TTE: No evidence of endocarditis  - Per EP: no need for extraction of subcutaneous AICD    #AS s/p TAVR  - Not on AC given bleed from wound site    #Pulmonary emboli  - Reportedly not on AC outpatient 2/2 recurrent bleeding at nonhealing wound site, no bleeding since hospitalized here, will start on Eliquis 5 mg BID and monitor for any signs of bleeding  - POCUS w/o RV dilation    Respiratory  #Acute hypoxic RF  -     GI/Nutrition    /Renal    Skin  #Nonhealing abdominal wound  - S/p skin grafting  - Recurrent hospitalizations for bleeding from site  - Plastic surgery consulted, appreciate recs  - Wound care: apply hydrogel & Aquacel pad every other day    ID    Endocrine    Hematologic/DVT ppx    Ethics MICU Accept Note    CHIEF COMPLAINT: Hypotension    HPI / INTERVAL HISTORY:     82 yo M (Alternate MRN [id number] Name: Cody Gatica) w/ HTN, HLD, HFrEF (EF 30-35%), VT, s/p AICD, AS s/p TAVR, CAD s/p stents, GERD, hypothyroid, anxiety, pancreatic ca s/p whipple 10/26/21, chemo and xrt with non-healing abdominal wound treated w/ HBOT and STSG 7/8/22, s/p repeat STSG wound vac placement on 9/29, PE on Xarelto, and recurrent hospitalizations for bleeding at graft site presents from [facility] with hypotension, tachycardia, fever, likely i/s/o septic shock, admitted to MICU for further management.    Blood cultures collected on 12/2 growing Strep anginosus and Klebsiella treated with zosyn; repeat blood cultures on 12/3 NGTD. MRSA negative s/p vanco x1. Initially required levophed for BP support but weaned off successfully and transferred to floors on midodrine 10 q8. Patient also incidentally found to have acute pulmonary emboli in the left upper lobe and possibly in the lingula; bedside POCUS without evidence of RV strain. AC was held after discussion due to chronically oozing abdominal wound; AC also held outpatient for history for portal vein thrombosis. Transferred to floors on prophylactic dose heparin. Concurrently found to have typhlitis and received treatment with zosyn.     On 12/7 RRT called for respiratory distress. Pt sent for CORBIN to rule out endocarditis. When procedure was completed, pt became hypoxic and hypotensive. In the setting of hypotension, pt was started on vaso, love, and negro. An A line was placed and pt was also intubated. Pt is accepted to MICU for pressure support and further management.      CXR needed. Unknown why     PAST MEDICAL & SURGICAL HISTORY:  Hypertension      Hyperlipidemia      HFrEF (heart failure with reduced ejection fraction)      AICD (automatic cardioverter/defibrillator) present      Aortic stenosis      Pancreatic cancer      Portal vein thrombosis      Pulmonary embolus      Nonhealing surgical wound      H/O Whipple procedure  2021, c/b nonhealing wound      Status post skin graft using Integra wound dressing      S/P TAVR (transcatheter aortic valve replacement)          FAMILY HISTORY:      SOCIAL HISTORY:  Smoking:   Substance Use:   EtOH Use:   Marital Status:   Sexual History:   Occupation:  Recent Travel:  Country of Birth:   Advance Directives:     HOME MEDICATIONS:      Allergies    No Known Allergies    Intolerances          REVIEW OF SYSTEMS:  Constitutional: No fevers, chills, weight loss, weight gain  HEENT: No vision problems, eye pain, nasal congestion, rhinorrhea, sore throat, dysphagia  CV: No chest pain, orthopnea, palpitations  Resp: No cough, dyspnea, wheezing, hemoptysis  GI: No nausea, vomiting, diarrhea, constipation, abdominal pain  : [ ] dysuria [ ] nocturia [ ] hematuria [ ] increased urinary frequency  Musculoskeletal: [ ] back pain [ ] myalgias [ ] arthralgias [ ] fracture  Skin: [ ] rash [ ] itch  Neurological: [ ] headache [ ] dizziness [ ] syncope [ ] weakness [ ] numbness  Psychiatric: [ ] anxiety [ ] depression  Endocrine: [ ] diabetes [ ] thyroid problem  Hematologic/Lymphatic: [ ] anemia [ ] bleeding problem  Allergic/Immunologic: [ ] itchy eyes [ ] nasal discharge [ ] hives [ ] angioedema  [ ] All other systems negative  [] Unable to assess ROS because pt ____    OBJECTIVE:  ICU Vital Signs Last 24 Hrs  T(C): 36.4 (07 Dec 2022 13:05), Max: 36.8 (07 Dec 2022 04:34)  T(F): 97.6 (07 Dec 2022 13:05), Max: 98.2 (07 Dec 2022 04:34)  HR: 107 (07 Dec 2022 14:07) (83 - 121)  BP: 85/52 (07 Dec 2022 14:07) (48/24 - 205/120)  BP(mean): 74 (07 Dec 2022 11:55) (74 - 74)  ABP: --  ABP(mean): --  RR: 22 (07 Dec 2022 14:07) (18 - 22)  SpO2: 91% (07 Dec 2022 13:15) (87% - 97%)    O2 Parameters below as of 07 Dec 2022 13:15  Patient On (Oxygen Delivery Method): mask, nonrebreather              12-06 @ 07:01  -  12-07 @ 07:00  --------------------------------------------------------  IN: 240 mL / OUT: 1105 mL / NET: -865 mL      CAPILLARY BLOOD GLUCOSE      POCT Blood Glucose.: 120 mg/dL (07 Dec 2022 13:58)      PHYSICAL EXAM:  GENERAL: NAD, lying comfortably in bed  HEAD: Atraumatic, normocephalic  EYES: EOMI b/l PERRLA b/l, conjunctiva and sclera clear  NECK: Supple, No JVD, No LAD  RESPIRATORY: Normal respiratory effort; lungs are clear to auscultation bilaterally  CARDIOVASCULAR: Regular rate and rhythm, normal S1 and S2, no murmur/rub/gallop; No lower extremity edema  ABDOMEN: Nontender, normoactive bowel sounds, no rebound/guarding; No hepatosplenomegaly  MUSCULOSKELETAL: no clubbing or cyanosis of digits; no joint swelling or tenderness to palpation  NEURO: Non focal   PSYCH: A+O to person, place, and time; affect appropriate    LINES: A line, Right IJ    HOSPITAL MEDICATIONS:  MEDICATIONS  (STANDING):  aMIOdarone    Tablet 200 milliGRAM(s) Oral daily  chlorhexidine 4% Liquid 1 Application(s) Topical <User Schedule>  DULoxetine 60 milliGRAM(s) Oral daily  gabapentin 100 milliGRAM(s) Oral three times a day  lactated ringers. 500 milliLiter(s) (100 mL/Hr) IV Continuous <Continuous>  lactated ringers. 1000 milliLiter(s) (75 mL/Hr) IV Continuous <Continuous>  levothyroxine 25 MICROGram(s) Oral daily  midodrine 5 milliGRAM(s) Oral every 8 hours  pancrelipase  (CREON 24,000 Lipase Units) 3 Capsule(s) Oral three times a day with meals  phytonadione   Solution 2.5 milliGRAM(s) Oral once  piperacillin/tazobactam IVPB.. 3.375 Gram(s) IV Intermittent every 8 hours  propofol Infusion 10 MICROgram(s)/kG/Min (5.37 mL/Hr) IV Continuous <Continuous>  tamsulosin 0.4 milliGRAM(s) Oral at bedtime  vasopressin Infusion 0.03 Unit(s)/Min (4.5 mL/Hr) IV Continuous <Continuous>  vasopressin Infusion 0.04 Unit(s)/Min (6 mL/Hr) IV Continuous <Continuous>    MEDICATIONS  (PRN):  acetaminophen     Tablet .. 650 milliGRAM(s) Oral every 6 hours PRN Mild Pain (1 - 3)  guaiFENesin Oral Liquid (Sugar-Free) 100 milliGRAM(s) Oral every 6 hours PRN Cough  oxyCODONE    IR 5 milliGRAM(s) Oral every 6 hours PRN Severe Pain (7 - 10)      LABS:                        8.4    5.85  )-----------( 159      ( 07 Dec 2022 14:15 )             26.4     Hgb Trend: 8.4<--, 7.9<--, 8.4<--, 7.3<--, 7.5<--  12-07    136  |  106  |  15  ----------------------------<  134<H>  4.2   |  22  |  0.64    Ca    7.9<L>      07 Dec 2022 07:11  Phos  2.9     12-07  Mg     1.6     12-07    TPro  5.5<L>  /  Alb  2.1<L>  /  TBili  1.1  /  DBili  x   /  AST  65<H>  /  ALT  37  /  AlkPhos  266<H>  12-07    Creatinine Trend: 0.64<--, 0.61<--, 0.70<--, 0.87<--, 0.91<--, 0.89<--  PT/INR - ( 07 Dec 2022 11:11 )   PT: 32.7 sec;   INR: 2.79 ratio         PTT - ( 06 Dec 2022 07:06 )  PTT:45.0 sec    Arterial Blood Gas:  12-07 @ 14:08  7.24/41/31/18/45.4/-9.1  ABG lactate: --  Arterial Blood Gas:  12-07 @ 13:41  7.37/33/57/19/87.7/-6  ABG lactate: --        MICROBIOLOGY:     RADIOLOGY & ADDITIONAL TESTS:      ASSESSMENT AND PLAN:  Mr. Gatica is a 82 yo M (Alternate MRN [id number] Name: Cody Gatica) w/ HTN, HLD, HFrEF (EF 30-35%), VT, s/p AICD, AS s/p TAVR, CAD s/p stents, GERD, hypothyroid, anxiety, pancreatic ca s/p whipple 10/26/21, chemo and xrt with non-healing abdominal wound treated w/ HBOT and STSG 7/8/22, s/p repeat STSG wound vac placement on 9/29, PE on Xarelto, and recurrent hospitalizations for bleeding at graft site presents from [facility] with hypotension and respiratory distress re-admitted to MICU for further management.    Neuro  AOx2-3 at baseline    #Neuropathy  - Holding home duloxetine/gabapentin for now while NPO  - On home oxycodone q6hr prn pain for diffuse pain    #Anxiety  - Home Xanax 0.25mg qhs i/s/o agitation; consider resuming if needed for agitation    Cardiovascular  #?Shock State  -C/w levo, negro, vaso and wean as tolerated    #HFrEF s/p AICD  - Holding home Entresto, Toprol i/s/o shock state  - Holding home amiodarone for now  - Euvolemic on exam, CTM  - TTE: No evidence of endocarditis  - Per EP: no need for extraction of subcutaneous AICD    #AS s/p TAVR  - Not on AC given bleed from abdominal wound site    #Pulmonary emboli  - Reportedly not on AC outpatient 2/2 recurrent bleeding at nonhealing wound site, no bleeding since hospitalized here, will start on Eliquis 5 mg BID and monitor for any signs of bleeding  - POCUS w/o RV dilation  - Prophylactic HSQ    Respiratory  # Acute hypoxic RF  - Poss due to   -     GI/Nutrition  #Typhlitis, colitis  - C/w antibiotics     - NPO for now   - Cont home Creon    /Renal  - SCr appropriate, CTM  - Jimenez in place  - Holding home Flomax while NPO    Skin  #Nonhealing abdominal wound  - S/p skin grafting  - Recurrent hospitalizations for bleeding from site  - Plastic surgery consulted, appreciate recs  - Wound care: apply hydrogel & Aquacel pad every other day    ID  #polymicrobial bacteremia suspect GI source/colitis/typhlitis  - patient presented w/ septic shock (now resolved); BCx grew Strep Anginosus and Klebsiella  - Patient w/ history of MSSA bacteremia from foot wound 9/2021 s/p toe amputation, 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    Endocrine  -No active issues    Heme/Onc  #Pancreatic Cancer  - S/o whipple, chemotherapy XRT  - Localized recurrent disease identified in 11/22  - No disease modifying therapy while inpt  - Family stating that [place] [facility] [place]    #Elevated INR  - ISO poor po intake  - CTM    DVT ppx  -HSQ ppx  -CTM for bleeding    #Anemia  - Chronic in nature  - Transfuse Hgb <7    Ethics MICU Accept Note    CHIEF COMPLAINT: Hypotension    HPI / INTERVAL HISTORY:     80 yo M (Alternate MRN [id number] Name: Cody Gatica) w/ HTN, HLD, HFrEF (EF 30-35%), VT, s/p AICD, AS s/p TAVR, CAD s/p stents, GERD, hypothyroid, anxiety, pancreatic ca s/p whipple 10/26/21, chemo and xrt with non-healing abdominal wound treated w/ HBOT and STSG 7/8/22, s/p repeat STSG wound vac placement on 9/29, PE on Xarelto, and recurrent hospitalizations for bleeding at graft site presents from [facility] with hypotension, tachycardia, fever, likely i/s/o septic shock, admitted to MICU for further management.    Blood cultures collected on 12/2 growing Strep anginosus and Klebsiella treated with zosyn; repeat blood cultures on 12/3 NGTD. MRSA negative s/p vanco x1. Initially required levophed for BP support but weaned off successfully and transferred to floors on midodrine 10 q8. Patient also incidentally found to have acute pulmonary emboli in the left upper lobe and possibly in the lingula; bedside POCUS without evidence of RV strain. AC was held after discussion due to chronically oozing abdominal wound; AC also held outpatient for history for portal vein thrombosis. Transferred to floors on prophylactic dose heparin. Concurrently found to have typhlitis and received treatment with zosyn.     On 12/7 RRT called for respiratory distress. Pt sent for CORBIN to rule out endocarditis. When procedure was completed, pt became hypoxic and hypotensive. In the setting of hypotension, pt was started on vaso, love, and negro. An A line was placed and pt was also intubated. Pt is accepted to MICU for pressure support and further management.      CXR needed. Unknown why     PAST MEDICAL & SURGICAL HISTORY:  Hypertension      Hyperlipidemia      HFrEF (heart failure with reduced ejection fraction)      AICD (automatic cardioverter/defibrillator) present      Aortic stenosis      Pancreatic cancer      Portal vein thrombosis      Pulmonary embolus      Nonhealing surgical wound      H/O Whipple procedure  2021, c/b nonhealing wound      Status post skin graft using Integra wound dressing      S/P TAVR (transcatheter aortic valve replacement)          FAMILY HISTORY:      SOCIAL HISTORY:  Smoking:   Substance Use:   EtOH Use:   Marital Status:   Sexual History:   Occupation:  Recent Travel:  Country of Birth:   Advance Directives:     HOME MEDICATIONS:      Allergies    No Known Allergies    Intolerances          REVIEW OF SYSTEMS:  Constitutional: No fevers, chills, weight loss, weight gain  HEENT: No vision problems, eye pain, nasal congestion, rhinorrhea, sore throat, dysphagia  CV: No chest pain, orthopnea, palpitations  Resp: No cough, dyspnea, wheezing, hemoptysis  GI: No nausea, vomiting, diarrhea, constipation, abdominal pain  : [ ] dysuria [ ] nocturia [ ] hematuria [ ] increased urinary frequency  Musculoskeletal: [ ] back pain [ ] myalgias [ ] arthralgias [ ] fracture  Skin: [ ] rash [ ] itch  Neurological: [ ] headache [ ] dizziness [ ] syncope [ ] weakness [ ] numbness  Psychiatric: [ ] anxiety [ ] depression  Endocrine: [ ] diabetes [ ] thyroid problem  Hematologic/Lymphatic: [ ] anemia [ ] bleeding problem  Allergic/Immunologic: [ ] itchy eyes [ ] nasal discharge [ ] hives [ ] angioedema  [ ] All other systems negative  [] Unable to assess ROS because pt ____    OBJECTIVE:  ICU Vital Signs Last 24 Hrs  T(C): 36.4 (07 Dec 2022 13:05), Max: 36.8 (07 Dec 2022 04:34)  T(F): 97.6 (07 Dec 2022 13:05), Max: 98.2 (07 Dec 2022 04:34)  HR: 107 (07 Dec 2022 14:07) (83 - 121)  BP: 85/52 (07 Dec 2022 14:07) (48/24 - 205/120)  BP(mean): 74 (07 Dec 2022 11:55) (74 - 74)  ABP: --  ABP(mean): --  RR: 22 (07 Dec 2022 14:07) (18 - 22)  SpO2: 91% (07 Dec 2022 13:15) (87% - 97%)    O2 Parameters below as of 07 Dec 2022 13:15  Patient On (Oxygen Delivery Method): mask, nonrebreather              12-06 @ 07:01  -  12-07 @ 07:00  --------------------------------------------------------  IN: 240 mL / OUT: 1105 mL / NET: -865 mL      CAPILLARY BLOOD GLUCOSE      POCT Blood Glucose.: 120 mg/dL (07 Dec 2022 13:58)      PHYSICAL EXAM:  GENERAL: NAD, lying comfortably in bed  HEAD: Atraumatic, normocephalic  EYES: EOMI b/l PERRLA b/l, conjunctiva and sclera clear  NECK: Supple, No JVD, No LAD  RESPIRATORY: Normal respiratory effort; lungs are clear to auscultation bilaterally  CARDIOVASCULAR: Regular rate and rhythm, normal S1 and S2, no murmur/rub/gallop; No lower extremity edema  ABDOMEN: Nontender, normoactive bowel sounds, no rebound/guarding; No hepatosplenomegaly  MUSCULOSKELETAL: no clubbing or cyanosis of digits; no joint swelling or tenderness to palpation  NEURO: Non focal   PSYCH: A+O to person, place, and time; affect appropriate    LINES: A line, Right IJ    HOSPITAL MEDICATIONS:  MEDICATIONS  (STANDING):  aMIOdarone    Tablet 200 milliGRAM(s) Oral daily  chlorhexidine 4% Liquid 1 Application(s) Topical <User Schedule>  DULoxetine 60 milliGRAM(s) Oral daily  gabapentin 100 milliGRAM(s) Oral three times a day  lactated ringers. 500 milliLiter(s) (100 mL/Hr) IV Continuous <Continuous>  lactated ringers. 1000 milliLiter(s) (75 mL/Hr) IV Continuous <Continuous>  levothyroxine 25 MICROGram(s) Oral daily  midodrine 5 milliGRAM(s) Oral every 8 hours  pancrelipase  (CREON 24,000 Lipase Units) 3 Capsule(s) Oral three times a day with meals  phytonadione   Solution 2.5 milliGRAM(s) Oral once  piperacillin/tazobactam IVPB.. 3.375 Gram(s) IV Intermittent every 8 hours  propofol Infusion 10 MICROgram(s)/kG/Min (5.37 mL/Hr) IV Continuous <Continuous>  tamsulosin 0.4 milliGRAM(s) Oral at bedtime  vasopressin Infusion 0.03 Unit(s)/Min (4.5 mL/Hr) IV Continuous <Continuous>  vasopressin Infusion 0.04 Unit(s)/Min (6 mL/Hr) IV Continuous <Continuous>    MEDICATIONS  (PRN):  acetaminophen     Tablet .. 650 milliGRAM(s) Oral every 6 hours PRN Mild Pain (1 - 3)  guaiFENesin Oral Liquid (Sugar-Free) 100 milliGRAM(s) Oral every 6 hours PRN Cough  oxyCODONE    IR 5 milliGRAM(s) Oral every 6 hours PRN Severe Pain (7 - 10)      LABS:                        8.4    5.85  )-----------( 159      ( 07 Dec 2022 14:15 )             26.4     Hgb Trend: 8.4<--, 7.9<--, 8.4<--, 7.3<--, 7.5<--  12-07    136  |  106  |  15  ----------------------------<  134<H>  4.2   |  22  |  0.64    Ca    7.9<L>      07 Dec 2022 07:11  Phos  2.9     12-07  Mg     1.6     12-07    TPro  5.5<L>  /  Alb  2.1<L>  /  TBili  1.1  /  DBili  x   /  AST  65<H>  /  ALT  37  /  AlkPhos  266<H>  12-07    Creatinine Trend: 0.64<--, 0.61<--, 0.70<--, 0.87<--, 0.91<--, 0.89<--  PT/INR - ( 07 Dec 2022 11:11 )   PT: 32.7 sec;   INR: 2.79 ratio         PTT - ( 06 Dec 2022 07:06 )  PTT:45.0 sec    Arterial Blood Gas:  12-07 @ 14:08  7.24/41/31/18/45.4/-9.1  ABG lactate: --  Arterial Blood Gas:  12-07 @ 13:41  7.37/33/57/19/87.7/-6  ABG lactate: --        MICROBIOLOGY:     RADIOLOGY & ADDITIONAL TESTS:      ASSESSMENT AND PLAN:  Mr. Gatica is a 80 yo M (Alternate MRN [id number] Name: Cody Gatica) w/ HTN, HLD, HFrEF (EF 30-35%), VT, s/p AICD, AS s/p TAVR, CAD s/p stents, GERD, hypothyroid, anxiety, pancreatic ca s/p whipple 10/26/21, chemo and xrt with non-healing abdominal wound treated w/ HBOT and STSG 7/8/22, s/p repeat STSG wound vac placement on 9/29, PE on Xarelto, and recurrent hospitalizations for bleeding at graft site presents from [facility] with hypotension and respiratory distress re-admitted to MICU for further management.    Neuro  AOx2-3 at baseline    #Neuropathy  - Holding home duloxetine/gabapentin for now while NPO  - On home oxycodone q6hr prn pain for diffuse pain    #Anxiety  - Home Xanax 0.25mg qhs i/s/o agitation; consider resuming if needed for agitation    Cardiovascular  #?Shock State  -C/w levo, negro, vaso and wean as tolerated    #HFrEF s/p AICD  - Holding home Entresto, Toprol i/s/o shock state  - Holding home amiodarone for now  - Euvolemic on exam, CTM  - TTE: No evidence of endocarditis  - Per EP: no need for extraction of subcutaneous AICD    #AS s/p TAVR  - Not on AC given bleed from abdominal wound site    #Pulmonary emboli  - Reportedly not on AC outpatient 2/2 recurrent bleeding at nonhealing wound site, no bleeding since hospitalized here, will start on Eliquis 5 mg BID and monitor for any signs of bleeding  - POCUS w/o RV dilation  - Prophylactic HSQ    Respiratory  # Acute hypoxic RF  - Poss due to   -     GI/Nutrition  #Typhlitis, colitis  - C/w antibiotics     - NPO for now   - Cont home Creon    /Renal  - SCr appropriate, CTM  - Jimenez in place  - Holding home Flomax while NPO    Skin  #Nonhealing abdominal wound  - S/p skin grafting  - Recurrent hospitalizations for bleeding from site  - Plastic surgery consulted, appreciate recs  - Wound care: apply hydrogel & Aquacel pad every other day    ID  #Septic shock from polymicrobial bacteremia suspect GI source/colitis/typhlitis  - patient presented w/ septic shock (now resolved); BCx grew Strep Anginosus and Klebsiella  - Patient w/ history of MSSA bacteremia from foot wound 9/2021 s/p toe amputation, 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    Endocrine  -No active issues    #Hypothyroidism  - C/w home synthroid    Heme/Onc  #Pancreatic Cancer  - S/o whipple, chemotherapy XRT  - Localized recurrent disease identified in 11/22  - No disease modifying therapy while inpt  - Family stating that prior plan was to seek further chemo at [facility] [place]    #Elevated INR  - ISO poor po intake  - CTM    DVT ppx  -HSQ ppx  -CTM for bleeding    #Anemia  - Chronic in nature  - Transfuse Hgb <7    Ethics MICU Accept Note    CHIEF COMPLAINT: Hypotension    HPI / INTERVAL HISTORY:     80 yo M (Alternate MRN [id number] Name: Cody Gatica) w/ HTN, HLD, HFrEF (EF 30-35%), VT, s/p AICD, AS s/p TAVR, CAD s/p stents, GERD, hypothyroid, anxiety, pancreatic ca s/p whipple 10/26/21, chemo and xrt with non-healing abdominal wound treated w/ HBOT and STSG 7/8/22, s/p repeat STSG wound vac placement on 9/29, PE on Xarelto, and recurrent hospitalizations for bleeding at graft site presents from [facility] with hypotension, tachycardia, fever, likely i/s/o septic shock, admitted to MICU for further management.    Blood cultures collected on 12/2 growing Strep anginosus and Klebsiella treated with zosyn; repeat blood cultures on 12/3 NGTD. MRSA negative s/p vanco x1. Initially required levophed for BP support but weaned off successfully and transferred to floors on midodrine 10 q8. Patient also incidentally found to have acute pulmonary emboli in the left upper lobe and possibly in the lingula; bedside POCUS without evidence of RV strain. AC was held after discussion due to chronically oozing abdominal wound; AC also held outpatient for history for portal vein thrombosis. Transferred to floors on prophylactic dose heparin. Concurrently found to have typhlitis and received treatment with zosyn.     On 12/7 RRT called for respiratory distress. Pt sent for CORBIN to rule out endocarditis. When procedure was completed, pt became hypoxic and hypotensive. In the setting of hypotension, pt was started on vaso, love, and negro. An A line was placed and pt was also intubated. Pt is accepted to MICU for pressure support and further management.      CXR needed. Unknown why     PAST MEDICAL & SURGICAL HISTORY:  Hypertension      Hyperlipidemia      HFrEF (heart failure with reduced ejection fraction)      AICD (automatic cardioverter/defibrillator) present      Aortic stenosis      Pancreatic cancer      Portal vein thrombosis      Pulmonary embolus      Nonhealing surgical wound      H/O Whipple procedure  2021, c/b nonhealing wound      Status post skin graft using Integra wound dressing      S/P TAVR (transcatheter aortic valve replacement)          FAMILY HISTORY:      SOCIAL HISTORY:  Smoking:   Substance Use:   EtOH Use:   Marital Status:   Sexual History:   Occupation:  Recent Travel:  Country of Birth:   Advance Directives:     HOME MEDICATIONS:      Allergies    No Known Allergies    Intolerances          REVIEW OF SYSTEMS:  Constitutional: No fevers, chills, weight loss, weight gain  HEENT: No vision problems, eye pain, nasal congestion, rhinorrhea, sore throat, dysphagia  CV: No chest pain, orthopnea, palpitations  Resp: No cough, dyspnea, wheezing, hemoptysis  GI: No nausea, vomiting, diarrhea, constipation, abdominal pain  : [ ] dysuria [ ] nocturia [ ] hematuria [ ] increased urinary frequency  Musculoskeletal: [ ] back pain [ ] myalgias [ ] arthralgias [ ] fracture  Skin: [ ] rash [ ] itch  Neurological: [ ] headache [ ] dizziness [ ] syncope [ ] weakness [ ] numbness  Psychiatric: [ ] anxiety [ ] depression  Endocrine: [ ] diabetes [ ] thyroid problem  Hematologic/Lymphatic: [ ] anemia [ ] bleeding problem  Allergic/Immunologic: [ ] itchy eyes [ ] nasal discharge [ ] hives [ ] angioedema  [ ] All other systems negative  [X] Unable to assess ROS because pt intubated and sedated.    OBJECTIVE:  ICU Vital Signs Last 24 Hrs  T(C): 36.4 (07 Dec 2022 13:05), Max: 36.8 (07 Dec 2022 04:34)  T(F): 97.6 (07 Dec 2022 13:05), Max: 98.2 (07 Dec 2022 04:34)  HR: 107 (07 Dec 2022 14:07) (83 - 121)  BP: 85/52 (07 Dec 2022 14:07) (48/24 - 205/120)  BP(mean): 74 (07 Dec 2022 11:55) (74 - 74)  ABP: --  ABP(mean): --  RR: 22 (07 Dec 2022 14:07) (18 - 22)  SpO2: 91% (07 Dec 2022 13:15) (87% - 97%)    O2 Parameters below as of 07 Dec 2022 13:15  Patient On (Oxygen Delivery Method): Intubated              12-06 @ 07:01  -  12-07 @ 07:00  --------------------------------------------------------  IN: 240 mL / OUT: 1105 mL / NET: -865 mL      CAPILLARY BLOOD GLUCOSE      POCT Blood Glucose.: 120 mg/dL (07 Dec 2022 13:58)      PHYSICAL EXAM:  GENERAL: +Intubated, sedated; lying comfortably in bed  HEAD: Atraumatic, normocephalic  EYES: EOMI b/l PERRLA b/l, conjunctiva and sclera clear  NECK: Supple, No JVD, No LAD  RESPIRATORY: +Intubated  CARDIOVASCULAR: Regular rate and rhythm, normal S1 and S2, no murmur/rub/gallop; No lower extremity edema  ABDOMEN: Nontender, normoactive bowel sounds, no rebound/guarding; No hepatosplenomegaly  MUSCULOSKELETAL: no clubbing or cyanosis of digits; no joint swelling or tenderness to palpation  NEURO: Non focal   PSYCH: Sedated    LINES: Right IJ    HOSPITAL MEDICATIONS:  MEDICATIONS  (STANDING):  aMIOdarone    Tablet 200 milliGRAM(s) Oral daily  chlorhexidine 4% Liquid 1 Application(s) Topical <User Schedule>  DULoxetine 60 milliGRAM(s) Oral daily  gabapentin 100 milliGRAM(s) Oral three times a day  lactated ringers. 500 milliLiter(s) (100 mL/Hr) IV Continuous <Continuous>  lactated ringers. 1000 milliLiter(s) (75 mL/Hr) IV Continuous <Continuous>  levothyroxine 25 MICROGram(s) Oral daily  midodrine 5 milliGRAM(s) Oral every 8 hours  pancrelipase  (CREON 24,000 Lipase Units) 3 Capsule(s) Oral three times a day with meals  phytonadione   Solution 2.5 milliGRAM(s) Oral once  piperacillin/tazobactam IVPB.. 3.375 Gram(s) IV Intermittent every 8 hours  propofol Infusion 10 MICROgram(s)/kG/Min (5.37 mL/Hr) IV Continuous <Continuous>  tamsulosin 0.4 milliGRAM(s) Oral at bedtime  vasopressin Infusion 0.03 Unit(s)/Min (4.5 mL/Hr) IV Continuous <Continuous>  vasopressin Infusion 0.04 Unit(s)/Min (6 mL/Hr) IV Continuous <Continuous>    MEDICATIONS  (PRN):  acetaminophen     Tablet .. 650 milliGRAM(s) Oral every 6 hours PRN Mild Pain (1 - 3)  guaiFENesin Oral Liquid (Sugar-Free) 100 milliGRAM(s) Oral every 6 hours PRN Cough  oxyCODONE    IR 5 milliGRAM(s) Oral every 6 hours PRN Severe Pain (7 - 10)      LABS:                        8.4    5.85  )-----------( 159      ( 07 Dec 2022 14:15 )             26.4     Hgb Trend: 8.4<--, 7.9<--, 8.4<--, 7.3<--, 7.5<--  12-07    136  |  106  |  15  ----------------------------<  134<H>  4.2   |  22  |  0.64    Ca    7.9<L>      07 Dec 2022 07:11  Phos  2.9     12-07  Mg     1.6     12-07    TPro  5.5<L>  /  Alb  2.1<L>  /  TBili  1.1  /  DBili  x   /  AST  65<H>  /  ALT  37  /  AlkPhos  266<H>  12-07    Creatinine Trend: 0.64<--, 0.61<--, 0.70<--, 0.87<--, 0.91<--, 0.89<--  PT/INR - ( 07 Dec 2022 11:11 )   PT: 32.7 sec;   INR: 2.79 ratio         PTT - ( 06 Dec 2022 07:06 )  PTT:45.0 sec    Arterial Blood Gas:  12-07 @ 14:08  7.24/41/31/18/45.4/-9.1  ABG lactate: --  Arterial Blood Gas:  12-07 @ 13:41  7.37/33/57/19/87.7/-6  ABG lactate: --        MICROBIOLOGY:     RADIOLOGY & ADDITIONAL TESTS:      ASSESSMENT AND PLAN:  Mr. Gatica is a 80 yo M (Alternate MRN [id number] Name: Cody Gatica) w/ HTN, HLD, HFrEF (EF 30-35%), VT, s/p AICD, AS s/p TAVR, CAD s/p stents, GERD, hypothyroid, anxiety, pancreatic ca s/p whipple 10/26/21, chemo and xrt with non-healing abdominal wound treated w/ HBOT and STSG 7/8/22, s/p repeat STSG wound vac placement on 9/29, PE on Xarelto, and recurrent hospitalizations for bleeding at graft site presents from [facility] with hypotension and respiratory distress re-admitted to MICU for further management.    Neuro  AOx2-3 at baseline    #Neuropathy  - Holding home duloxetine/gabapentin for now while NPO  - On home oxycodone q6hr prn pain for diffuse pain    #Anxiety  - Home Xanax 0.25mg qhs i/s/o agitation; consider resuming if needed for agitation    Cardiovascular  #Shock  -Possibly due to flash pulmonary edema vs aspiration during procedure   -C/w levo, negro, vaso and wean as tolerated  -Restart b-blocker as tolerated    #HFrEF s/p AICD  - Holding home Entresto, Toprol i/s/o shock state  - Holding home amiodarone for now  - Euvolemic on exam, CTM  - TTE: No evidence of endocarditis  - Per EP: no need for extraction of subcutaneous AICD    #AS s/p TAVR  - Not on AC given bleed from abdominal wound site    #Pulmonary emboli  - Reportedly not on AC outpatient 2/2 recurrent bleeding at nonhealing wound site, no bleeding since hospitalized here, will start on Eliquis 5 mg BID and monitor for any signs of bleeding  - POCUS w/o RV dilation  - Prophylactic HSQ    Respiratory  # Acute hypoxic RF  - Poss due to   -     GI/Nutrition  #Typhlitis, colitis  - C/w antibiotics     - NPO for now   - Cont home Creon    /Renal  - SCr appropriate, CTM  - Jimenez in place  - Holding home Flomax while NPO    Skin  #Nonhealing abdominal wound  - S/p skin grafting  - Recurrent hospitalizations for bleeding from site  - Plastic surgery consulted, appreciate recs  - Wound care: apply hydrogel & Aquacel pad every other day    ID  #Septic shock from polymicrobial bacteremia suspect GI source/colitis/typhlitis  - patient presented w/ septic shock (now resolved); BCx grew Strep Anginosus and Klebsiella  - Patient w/ history of MSSA bacteremia from foot wound 9/2021 s/p toe amputation, 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    Endocrine  -No active issues    #Hypothyroidism  - C/w home synthroid    Heme/Onc  #Pancreatic Cancer  - S/o whipple, chemotherapy XRT  - Localized recurrent disease identified in 11/22  - No disease modifying therapy while inpt  - Family stating that prior plan was to seek further chemo at [facility] [place]    #Elevated INR  - ISO poor po intake  - CTM    DVT ppx  -HSQ ppx  -CTM for bleeding    #Anemia  - Chronic in nature  - Transfuse Hgb <7    Ethics  #Full Code MICU Accept Note    CHIEF COMPLAINT: Hypotension    HPI / INTERVAL HISTORY:     82 yo M (Alternate MRN [id number] Name: Cody Gatica) w/ HTN, HLD, HFrEF (EF 30-35%), VT, s/p AICD, AS s/p TAVR, CAD s/p stents, GERD, hypothyroid, anxiety, pancreatic ca s/p whipple 10/26/21, chemo and xrt with non-healing abdominal wound treated w/ HBOT and STSG 7/8/22, s/p repeat STSG wound vac placement on 9/29, PE on Xarelto, and recurrent hospitalizations for bleeding at graft site presents from [facility] with hypotension, tachycardia, fever, likely i/s/o septic shock, admitted to MICU for further management.    Blood cultures collected on 12/2 growing Strep anginosus and Klebsiella treated with zosyn; repeat blood cultures on 12/3 NGTD. MRSA negative s/p vanco x1. Initially required levophed for BP support but weaned off successfully and transferred to floors on midodrine 10 q8. Patient also incidentally found to have acute pulmonary emboli in the left upper lobe and possibly in the lingula; bedside POCUS without evidence of RV strain. AC was held after discussion due to chronically oozing abdominal wound; AC also held outpatient for history for portal vein thrombosis. Transferred to floors on prophylactic dose heparin. Concurrently found to have typhlitis and received treatment with zosyn.     On 12/7 RRT called for respiratory distress. Pt sent for CORBIN to rule out endocarditis. When procedure was completed, pt became hypoxic and hypotensive. In the setting of hypotension, pt was started on vaso, love, and negro. An A line was placed and pt was also intubated. Pt is accepted to MICU for pressure support and further management.     PAST MEDICAL & SURGICAL HISTORY:  Hypertension      Hyperlipidemia      HFrEF (heart failure with reduced ejection fraction)      AICD (automatic cardioverter/defibrillator) present      Aortic stenosis      Pancreatic cancer      Portal vein thrombosis      Pulmonary embolus      Nonhealing surgical wound      H/O Whipple procedure  2021, c/b nonhealing wound      Status post skin graft using Integra wound dressing      S/P TAVR (transcatheter aortic valve replacement)          FAMILY HISTORY:      SOCIAL HISTORY:  Smoking:   Substance Use:   EtOH Use:   Marital Status:   Sexual History:   Occupation:  Recent Travel:  Country of Birth:   Advance Directives:     HOME MEDICATIONS:      Allergies    No Known Allergies    Intolerances          REVIEW OF SYSTEMS:  Constitutional: No fevers, chills, weight loss, weight gain  HEENT: No vision problems, eye pain, nasal congestion, rhinorrhea, sore throat, dysphagia  CV: No chest pain, orthopnea, palpitations  Resp: No cough, dyspnea, wheezing, hemoptysis  GI: No nausea, vomiting, diarrhea, constipation, abdominal pain  : [ ] dysuria [ ] nocturia [ ] hematuria [ ] increased urinary frequency  Musculoskeletal: [ ] back pain [ ] myalgias [ ] arthralgias [ ] fracture  Skin: [ ] rash [ ] itch  Neurological: [ ] headache [ ] dizziness [ ] syncope [ ] weakness [ ] numbness  Psychiatric: [ ] anxiety [ ] depression  Endocrine: [ ] diabetes [ ] thyroid problem  Hematologic/Lymphatic: [ ] anemia [ ] bleeding problem  Allergic/Immunologic: [ ] itchy eyes [ ] nasal discharge [ ] hives [ ] angioedema  [ ] All other systems negative  [X] Unable to assess ROS because pt intubated and sedated.    OBJECTIVE:  ICU Vital Signs Last 24 Hrs  T(C): 36.4 (07 Dec 2022 13:05), Max: 36.8 (07 Dec 2022 04:34)  T(F): 97.6 (07 Dec 2022 13:05), Max: 98.2 (07 Dec 2022 04:34)  HR: 107 (07 Dec 2022 14:07) (83 - 121)  BP: 85/52 (07 Dec 2022 14:07) (48/24 - 205/120)  BP(mean): 74 (07 Dec 2022 11:55) (74 - 74)  ABP: --  ABP(mean): --  RR: 22 (07 Dec 2022 14:07) (18 - 22)  SpO2: 91% (07 Dec 2022 13:15) (87% - 97%)    O2 Parameters below as of 07 Dec 2022 13:15  Patient On (Oxygen Delivery Method): Intubated              12-06 @ 07:01  -  12-07 @ 07:00  --------------------------------------------------------  IN: 240 mL / OUT: 1105 mL / NET: -865 mL      CAPILLARY BLOOD GLUCOSE      POCT Blood Glucose.: 120 mg/dL (07 Dec 2022 13:58)      PHYSICAL EXAM:  GENERAL: +Intubated, sedated; lying comfortably in bed  HEAD: Atraumatic, normocephalic  EYES: EOMI b/l PERRLA b/l, conjunctiva and sclera clear  NECK: Supple, No JVD, No LAD  RESPIRATORY: +Intubated  CARDIOVASCULAR: Regular rate and rhythm, normal S1 and S2, no murmur/rub/gallop; No lower extremity edema  ABDOMEN: Nontender, normoactive bowel sounds, no rebound/guarding; No hepatosplenomegaly  MUSCULOSKELETAL: no clubbing or cyanosis of digits; no joint swelling or tenderness to palpation  NEURO: Non focal   PSYCH: Sedated    LINES: Right IJ    HOSPITAL MEDICATIONS:  MEDICATIONS  (STANDING):  aMIOdarone    Tablet 200 milliGRAM(s) Oral daily  chlorhexidine 4% Liquid 1 Application(s) Topical <User Schedule>  DULoxetine 60 milliGRAM(s) Oral daily  gabapentin 100 milliGRAM(s) Oral three times a day  lactated ringers. 500 milliLiter(s) (100 mL/Hr) IV Continuous <Continuous>  lactated ringers. 1000 milliLiter(s) (75 mL/Hr) IV Continuous <Continuous>  levothyroxine 25 MICROGram(s) Oral daily  midodrine 5 milliGRAM(s) Oral every 8 hours  pancrelipase  (CREON 24,000 Lipase Units) 3 Capsule(s) Oral three times a day with meals  phytonadione   Solution 2.5 milliGRAM(s) Oral once  piperacillin/tazobactam IVPB.. 3.375 Gram(s) IV Intermittent every 8 hours  propofol Infusion 10 MICROgram(s)/kG/Min (5.37 mL/Hr) IV Continuous <Continuous>  tamsulosin 0.4 milliGRAM(s) Oral at bedtime  vasopressin Infusion 0.03 Unit(s)/Min (4.5 mL/Hr) IV Continuous <Continuous>  vasopressin Infusion 0.04 Unit(s)/Min (6 mL/Hr) IV Continuous <Continuous>    MEDICATIONS  (PRN):  acetaminophen     Tablet .. 650 milliGRAM(s) Oral every 6 hours PRN Mild Pain (1 - 3)  guaiFENesin Oral Liquid (Sugar-Free) 100 milliGRAM(s) Oral every 6 hours PRN Cough  oxyCODONE    IR 5 milliGRAM(s) Oral every 6 hours PRN Severe Pain (7 - 10)      LABS:                        8.4    5.85  )-----------( 159      ( 07 Dec 2022 14:15 )             26.4     Hgb Trend: 8.4<--, 7.9<--, 8.4<--, 7.3<--, 7.5<--  12-07    136  |  106  |  15  ----------------------------<  134<H>  4.2   |  22  |  0.64    Ca    7.9<L>      07 Dec 2022 07:11  Phos  2.9     12-07  Mg     1.6     12-07    TPro  5.5<L>  /  Alb  2.1<L>  /  TBili  1.1  /  DBili  x   /  AST  65<H>  /  ALT  37  /  AlkPhos  266<H>  12-07    Creatinine Trend: 0.64<--, 0.61<--, 0.70<--, 0.87<--, 0.91<--, 0.89<--  PT/INR - ( 07 Dec 2022 11:11 )   PT: 32.7 sec;   INR: 2.79 ratio         PTT - ( 06 Dec 2022 07:06 )  PTT:45.0 sec    Arterial Blood Gas:  12-07 @ 14:08  7.24/41/31/18/45.4/-9.1  ABG lactate: --  Arterial Blood Gas:  12-07 @ 13:41  7.37/33/57/19/87.7/-6  ABG lactate: --        MICROBIOLOGY:     RADIOLOGY & ADDITIONAL TESTS:      ASSESSMENT AND PLAN:  Mr. Gatica is a 82 yo M (Alternate MRN [id number] Name: Cody Gatica) w/ HTN, HLD, HFrEF (EF 30-35%), VT, s/p AICD, AS s/p TAVR, CAD s/p stents, GERD, hypothyroid, anxiety, pancreatic ca s/p whipple 10/26/21, chemo and xrt with non-healing abdominal wound treated w/ HBOT and STSG 7/8/22, s/p repeat STSG wound vac placement on 9/29, PE on Xarelto, and recurrent hospitalizations for bleeding at graft site presents from [facility] with hypotension and respiratory distress re-admitted to MICU for further management.    Neuro  AOx2-3 at baseline    #Neuropathy  - Holding home duloxetine/gabapentin for now while NPO  - On home oxycodone q6hr prn pain for diffuse pain    #Anxiety  - Home Xanax 0.25mg qhs i/s/o agitation; consider resuming if needed for agitation    Cardiovascular  #Shock  -Possibly due to flash pulmonary edema vs aspiration during procedure   -C/w levo, negro, vaso and wean as tolerated  -Restart b-blocker as tolerated    #HFrEF s/p AICD  - Holding home Entresto, Toprol i/s/o shock state  - Holding home amiodarone for now  - Euvolemic on exam, CTM  - TTE (12/7) to r/o endocarditis; EF 50%  - Per EP: no need for extraction of subcutaneous AICD      #AS s/p TAVR  - Not on AC given bleed from abdominal wound site    #Pulmonary emboli  - Reportedly not on AC outpatient 2/2 recurrent bleeding at nonhealing wound site, no bleeding since hospitalized here, will start on Eliquis 5 mg BID and monitor for any signs of bleeding  - POCUS w/o RV dilation  - VA duplex (12/6) no evidence of DVT  - Prophylactic HSQ  - CBC 1500 for eliquis monitoring given portal vein thrombosis --> 8.4 on 12/06 pm    Respiratory  # Acute hypoxic RF  - Poss due to aspiration during CORBIN vs PE   - Intubated    GI/Nutrition  #Typhlitis, colitis  - C/w antibiotics     - NPO for now   - Cont home Creon    /Renal  - SCr appropriate, CTM  - Jimenez in place  - Holding home Flomax while NPO    Skin  #Nonhealing abdominal wound  - S/p skin grafting  - Recurrent hospitalizations for bleeding from site  - Plastic surgery consulted, appreciate recs  - Wound care: apply hydrogel & Aquacel pad every other day    ID  #Septic shock from polymicrobial bacteremia suspect GI source/colitis/typhlitis  - patient presented w/ septic shock (now resolved); BCx grew Strep Anginosus and Klebsiella  - Patient w/ history of MSSA bacteremia from foot wound 9/2021 s/p toe amputation, 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    Endocrine  -No active issues    #Hypothyroidism  - C/w home synthroid    Heme/Onc  #Pancreatic Cancer  - S/p whipple, chemotherapy XRT  - Localized recurrent disease identified in 11/22  - No disease modifying therapy while inpt  - Cont home Creon  - Family stating that prior plan was to seek further chemo at [facility] [place]    #Elevated INR  - ISO poor po intake  - CTM    DVT ppx  -Eliquis   -CTM for bleeding    #Anemia  - Chronic in nature  - Transfuse Hgb <7    Ethics  #Full Code MICU Accept Note    CHIEF COMPLAINT: Hypotension    HPI / INTERVAL HISTORY:     82 yo M (Alternate MRN [id number] Name: Cody Gatica) w/ HTN, HLD, HFrEF (EF 30-35%), VT, s/p AICD, AS s/p TAVR, CAD s/p stents, GERD, hypothyroid, anxiety, pancreatic ca s/p whipple 10/26/21, chemo and xrt with non-healing abdominal wound treated w/ HBOT and STSG 7/8/22, s/p repeat STSG wound vac placement on 9/29, PE on Xarelto, and recurrent hospitalizations for bleeding at graft site presents from [facility] with hypotension, tachycardia, fever, likely i/s/o septic shock, admitted to MICU for further management.    Blood cultures collected on 12/2 growing Strep anginosus and Klebsiella treated with zosyn; repeat blood cultures on 12/3 NGTD. MRSA negative s/p vanco x1. Initially required levophed for BP support but weaned off successfully and transferred to floors on midodrine 10 q8. Patient also incidentally found to have acute pulmonary emboli in the left upper lobe and possibly in the lingula; bedside POCUS without evidence of RV strain. AC was held after discussion due to chronically oozing abdominal wound; AC also held outpatient for history for portal vein thrombosis. Transferred to floors on prophylactic dose heparin. Concurrently found to have typhlitis and received treatment with zosyn.     On 12/7 RRT called for hypoxia and hypotension in PACU after CORBIN. Pt noted to be tachypneic on NRB mask and escalated to BIPAP. ABG at the time showed: pH: 7.24/41/31/18/45%, tachycardic to 140s and BP dropped to 40/20s. 1/3 of 1L IVF given but stopped in setting of CHF. In the setting of persistent hypotension and MAP in 40s, pt was started on vaso, love, and negro and intubated. Pt is accepted to MICU for pressure support and further management.     PAST MEDICAL & SURGICAL HISTORY:  Hypertension      Hyperlipidemia      HFrEF (heart failure with reduced ejection fraction)      AICD (automatic cardioverter/defibrillator) present      Aortic stenosis      Pancreatic cancer      Portal vein thrombosis      Pulmonary embolus      Nonhealing surgical wound      H/O Whipple procedure  2021, c/b nonhealing wound      Status post skin graft using Integra wound dressing      S/P TAVR (transcatheter aortic valve replacement)          FAMILY HISTORY:      SOCIAL HISTORY:  Smoking:   Substance Use:   EtOH Use:   Marital Status:   Sexual History:   Occupation:  Recent Travel:  Country of Birth:   Advance Directives:     HOME MEDICATIONS:      Allergies    No Known Allergies    Intolerances          REVIEW OF SYSTEMS:  Constitutional: No fevers, chills, weight loss, weight gain  HEENT: No vision problems, eye pain, nasal congestion, rhinorrhea, sore throat, dysphagia  CV: No chest pain, orthopnea, palpitations  Resp: No cough, dyspnea, wheezing, hemoptysis  GI: No nausea, vomiting, diarrhea, constipation, abdominal pain  : [ ] dysuria [ ] nocturia [ ] hematuria [ ] increased urinary frequency  Musculoskeletal: [ ] back pain [ ] myalgias [ ] arthralgias [ ] fracture  Skin: [ ] rash [ ] itch  Neurological: [ ] headache [ ] dizziness [ ] syncope [ ] weakness [ ] numbness  Psychiatric: [ ] anxiety [ ] depression  Endocrine: [ ] diabetes [ ] thyroid problem  Hematologic/Lymphatic: [ ] anemia [ ] bleeding problem  Allergic/Immunologic: [ ] itchy eyes [ ] nasal discharge [ ] hives [ ] angioedema  [ ] All other systems negative  [X] Unable to assess ROS because pt intubated and sedated.    OBJECTIVE:  ICU Vital Signs Last 24 Hrs  T(C): 36.4 (07 Dec 2022 13:05), Max: 36.8 (07 Dec 2022 04:34)  T(F): 97.6 (07 Dec 2022 13:05), Max: 98.2 (07 Dec 2022 04:34)  HR: 107 (07 Dec 2022 14:07) (83 - 121)  BP: 85/52 (07 Dec 2022 14:07) (48/24 - 205/120)  BP(mean): 74 (07 Dec 2022 11:55) (74 - 74)  ABP: --  ABP(mean): --  RR: 22 (07 Dec 2022 14:07) (18 - 22)  SpO2: 91% (07 Dec 2022 13:15) (87% - 97%)    O2 Parameters below as of 07 Dec 2022 13:15  Patient On (Oxygen Delivery Method): Intubated              12-06 @ 07:01  -  12-07 @ 07:00  --------------------------------------------------------  IN: 240 mL / OUT: 1105 mL / NET: -865 mL      CAPILLARY BLOOD GLUCOSE      POCT Blood Glucose.: 120 mg/dL (07 Dec 2022 13:58)      PHYSICAL EXAM:  GENERAL: +Intubated, sedated; lying comfortably in bed  HEAD: Atraumatic, normocephalic  EYES: EOMI b/l PERRLA b/l, conjunctiva and sclera clear  NECK: Supple, No JVD, No LAD  RESPIRATORY: +Intubated  CARDIOVASCULAR: Regular rate and rhythm, normal S1 and S2, no murmur/rub/gallop; No lower extremity edema  ABDOMEN: Nontender, normoactive bowel sounds, no rebound/guarding; No hepatosplenomegaly  MUSCULOSKELETAL: no clubbing or cyanosis of digits; no joint swelling or tenderness to palpation  NEURO: Non focal   PSYCH: Sedated    LINES: Right IJ    HOSPITAL MEDICATIONS:  MEDICATIONS  (STANDING):  aMIOdarone    Tablet 200 milliGRAM(s) Oral daily  chlorhexidine 4% Liquid 1 Application(s) Topical <User Schedule>  DULoxetine 60 milliGRAM(s) Oral daily  gabapentin 100 milliGRAM(s) Oral three times a day  lactated ringers. 500 milliLiter(s) (100 mL/Hr) IV Continuous <Continuous>  lactated ringers. 1000 milliLiter(s) (75 mL/Hr) IV Continuous <Continuous>  levothyroxine 25 MICROGram(s) Oral daily  midodrine 5 milliGRAM(s) Oral every 8 hours  pancrelipase  (CREON 24,000 Lipase Units) 3 Capsule(s) Oral three times a day with meals  phytonadione   Solution 2.5 milliGRAM(s) Oral once  piperacillin/tazobactam IVPB.. 3.375 Gram(s) IV Intermittent every 8 hours  propofol Infusion 10 MICROgram(s)/kG/Min (5.37 mL/Hr) IV Continuous <Continuous>  tamsulosin 0.4 milliGRAM(s) Oral at bedtime  vasopressin Infusion 0.03 Unit(s)/Min (4.5 mL/Hr) IV Continuous <Continuous>  vasopressin Infusion 0.04 Unit(s)/Min (6 mL/Hr) IV Continuous <Continuous>    MEDICATIONS  (PRN):  acetaminophen     Tablet .. 650 milliGRAM(s) Oral every 6 hours PRN Mild Pain (1 - 3)  guaiFENesin Oral Liquid (Sugar-Free) 100 milliGRAM(s) Oral every 6 hours PRN Cough  oxyCODONE    IR 5 milliGRAM(s) Oral every 6 hours PRN Severe Pain (7 - 10)      LABS:                        8.4    5.85  )-----------( 159      ( 07 Dec 2022 14:15 )             26.4     Hgb Trend: 8.4<--, 7.9<--, 8.4<--, 7.3<--, 7.5<--  12-07    136  |  106  |  15  ----------------------------<  134<H>  4.2   |  22  |  0.64    Ca    7.9<L>      07 Dec 2022 07:11  Phos  2.9     12-07  Mg     1.6     12-07    TPro  5.5<L>  /  Alb  2.1<L>  /  TBili  1.1  /  DBili  x   /  AST  65<H>  /  ALT  37  /  AlkPhos  266<H>  12-07    Creatinine Trend: 0.64<--, 0.61<--, 0.70<--, 0.87<--, 0.91<--, 0.89<--  PT/INR - ( 07 Dec 2022 11:11 )   PT: 32.7 sec;   INR: 2.79 ratio         PTT - ( 06 Dec 2022 07:06 )  PTT:45.0 sec    Arterial Blood Gas:  12-07 @ 14:08  7.24/41/31/18/45.4/-9.1  ABG lactate: --  Arterial Blood Gas:  12-07 @ 13:41  7.37/33/57/19/87.7/-6  ABG lactate: --        MICROBIOLOGY:     RADIOLOGY & ADDITIONAL TESTS:      ASSESSMENT AND PLAN:  Mr. Gatica is a 82 yo M (Alternate MRN [id number] Name: Cody Gatica) w/ HTN, HLD, HFrEF (EF 30-35%), VT, s/p AICD, AS s/p TAVR, CAD s/p stents, GERD, hypothyroid, anxiety, pancreatic ca s/p whipple 10/26/21, chemo and xrt with non-healing abdominal wound treated w/ HBOT and STSG 7/8/22, s/p repeat STSG wound vac placement on 9/29, PE on Xarelto, and recurrent hospitalizations for bleeding at graft site presents from [facility] with hypotension and respiratory distress re-admitted to MICU for further management.    Neuro  AOx2-3 at baseline    #Neuropathy  - Holding home duloxetine/gabapentin for now while NPO  - On home oxycodone q6hr prn pain for diffuse pain    #Anxiety  - Home Xanax 0.25mg qhs i/s/o agitation; consider resuming if needed for agitation    Cardiovascular  #Shock  -Possibly due to flash pulmonary edema vs aspiration during procedure   -C/w levo, negro, vaso and wean as tolerated  -Restart b-blocker as tolerated    #HFrEF s/p AICD  - Holding home Entresto, Toprol i/s/o shock state  - Holding home amiodarone for now  - Euvolemic on exam, CTM  - TTE (12/7) to r/o endocarditis; EF 50%  - Per EP: no need for extraction of subcutaneous AICD      #AS s/p TAVR  - Not on AC given bleed from abdominal wound site    #Pulmonary emboli  - Reportedly not on AC outpatient 2/2 recurrent bleeding at nonhealing wound site, no bleeding since hospitalized here, will start on Eliquis 5 mg BID and monitor for any signs of bleeding  - POCUS w/o RV dilation  - VA duplex (12/6) no evidence of DVT  - Prophylactic HSQ  - CBC 1500 for eliquis monitoring given portal vein thrombosis --> 8.4 on 12/06 pm    Respiratory  # Acute hypoxic RF  - Poss due to aspiration during CORBIN vs PE   - Intubated    GI/Nutrition  #Typhlitis, colitis  - C/w antibiotics     - NPO for now   - Cont home Creon    /Renal  - SCr appropriate, CTM  - Jimenez in place  - Holding home Flomax while NPO    Skin  #Nonhealing abdominal wound  - S/p skin grafting  - Recurrent hospitalizations for bleeding from site  - Plastic surgery consulted, appreciate recs  - Wound care: apply hydrogel & Aquacel pad every other day    ID  #Septic shock from polymicrobial bacteremia suspect GI source/colitis/typhlitis  - patient presented w/ septic shock (now resolved); BCx grew Strep Anginosus and Klebsiella  - Patient w/ history of MSSA bacteremia from foot wound 9/2021 s/p toe amputation, 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    Endocrine  -No active issues    #Hypothyroidism  - C/w home synthroid    Heme/Onc  #Pancreatic Cancer  - S/p whipple, chemotherapy XRT  - Localized recurrent disease identified in 11/22  - No disease modifying therapy while inpt  - Cont home Creon  - Family stating that prior plan was to seek further chemo at [facility] [place]    #Elevated INR  - ISO poor po intake  - CTM    DVT ppx  -Eliquis   -CTM for bleeding    #Anemia  - Chronic in nature  - Transfuse Hgb <7    Ethics  #Full Code MICU Accept Note    CHIEF COMPLAINT: Hypotension    HPI / INTERVAL HISTORY:     82 yo M (Alternate MRN [id number] Name: Cody Gatica) w/ HTN, HLD, HFrEF (EF 30-35%), VT, s/p AICD, AS s/p TAVR, CAD s/p stents, GERD, hypothyroid, anxiety, pancreatic ca s/p whipple 10/26/21, chemo and xrt with non-healing abdominal wound treated w/ HBOT and STSG 7/8/22, s/p repeat STSG wound vac placement on 9/29, PE on Xarelto, and recurrent hospitalizations for bleeding at graft site presents from [facility] with hypotension, tachycardia, fever, likely i/s/o septic shock, admitted to MICU for further management.    Blood cultures collected on 12/2 growing Strep anginosus and Klebsiella treated with zosyn; repeat blood cultures on 12/3 NGTD. MRSA negative s/p vanco x1. Initially required levophed for BP support but weaned off successfully and transferred to floors on midodrine 10 q8. Patient also incidentally found to have acute pulmonary emboli in the left upper lobe and possibly in the lingula; bedside POCUS without evidence of RV strain. AC was held after discussion due to chronically oozing abdominal wound; AC also held outpatient for history for portal vein thrombosis. Transferred to floors on prophylactic dose heparin. Concurrently found to have typhlitis and received treatment with zosyn.     On 12/7 RRT called for hypoxia and hypotension in PACU after CORBIN. Pt noted to be tachypneic on NRB mask and escalated to BIPAP. ABG at the time showed: pH: 7.24/41/31/18/45%, tachycardic to 140s and BP dropped to 40/20s. 1/3 of 1L IVF given but stopped in setting of CHF. In the setting of persistent hypotension and MAP in 40s, pt was started on vaso, love, and negro and intubated. Pt is being accepted to MICU for pressure support and further management.     PAST MEDICAL & SURGICAL HISTORY:  Hypertension      Hyperlipidemia      HFrEF (heart failure with reduced ejection fraction)      AICD (automatic cardioverter/defibrillator) present      Aortic stenosis      Pancreatic cancer      Portal vein thrombosis      Pulmonary embolus      Nonhealing surgical wound      H/O Whipple procedure  2021, c/b nonhealing wound      Status post skin graft using Integra wound dressing      S/P TAVR (transcatheter aortic valve replacement)          FAMILY HISTORY:      SOCIAL HISTORY:  Smoking:   Substance Use:   EtOH Use:   Marital Status:   Sexual History:   Occupation:  Recent Travel:  Country of Birth:   Advance Directives:     HOME MEDICATIONS:      Allergies    No Known Allergies    Intolerances          REVIEW OF SYSTEMS:  Constitutional: No fevers, chills, weight loss, weight gain  HEENT: No vision problems, eye pain, nasal congestion, rhinorrhea, sore throat, dysphagia  CV: No chest pain, orthopnea, palpitations  Resp: No cough, dyspnea, wheezing, hemoptysis  GI: No nausea, vomiting, diarrhea, constipation, abdominal pain  : [ ] dysuria [ ] nocturia [ ] hematuria [ ] increased urinary frequency  Musculoskeletal: [ ] back pain [ ] myalgias [ ] arthralgias [ ] fracture  Skin: [ ] rash [ ] itch  Neurological: [ ] headache [ ] dizziness [ ] syncope [ ] weakness [ ] numbness  Psychiatric: [ ] anxiety [ ] depression  Endocrine: [ ] diabetes [ ] thyroid problem  Hematologic/Lymphatic: [ ] anemia [ ] bleeding problem  Allergic/Immunologic: [ ] itchy eyes [ ] nasal discharge [ ] hives [ ] angioedema  [ ] All other systems negative  [X] Unable to assess ROS because pt intubated and sedated.    OBJECTIVE:  ICU Vital Signs Last 24 Hrs  T(C): 36.4 (07 Dec 2022 13:05), Max: 36.8 (07 Dec 2022 04:34)  T(F): 97.6 (07 Dec 2022 13:05), Max: 98.2 (07 Dec 2022 04:34)  HR: 107 (07 Dec 2022 14:07) (83 - 121)  BP: 85/52 (07 Dec 2022 14:07) (48/24 - 205/120)  BP(mean): 74 (07 Dec 2022 11:55) (74 - 74)  ABP: --  ABP(mean): --  RR: 22 (07 Dec 2022 14:07) (18 - 22)  SpO2: 91% (07 Dec 2022 13:15) (87% - 97%)    O2 Parameters below as of 07 Dec 2022 13:15  Patient On (Oxygen Delivery Method): Intubated              12-06 @ 07:01  -  12-07 @ 07:00  --------------------------------------------------------  IN: 240 mL / OUT: 1105 mL / NET: -865 mL      CAPILLARY BLOOD GLUCOSE      POCT Blood Glucose.: 120 mg/dL (07 Dec 2022 13:58)      PHYSICAL EXAM:  GENERAL: +Intubated, sedated; lying comfortably in bed  HEAD: Atraumatic, normocephalic  EYES: EOMI b/l PERRLA b/l, conjunctiva and sclera clear  NECK: Supple, No JVD, No LAD  RESPIRATORY: +Intubated  CARDIOVASCULAR: Regular rate and rhythm, normal S1 and S2, no murmur/rub/gallop; No lower extremity edema  ABDOMEN: Nontender, normoactive bowel sounds, no rebound/guarding; No hepatosplenomegaly  MUSCULOSKELETAL: no clubbing or cyanosis of digits; no joint swelling or tenderness to palpation  NEURO: Non focal   PSYCH: Sedated    LINES: Right IJ    HOSPITAL MEDICATIONS:  MEDICATIONS  (STANDING):  aMIOdarone    Tablet 200 milliGRAM(s) Oral daily  chlorhexidine 4% Liquid 1 Application(s) Topical <User Schedule>  DULoxetine 60 milliGRAM(s) Oral daily  gabapentin 100 milliGRAM(s) Oral three times a day  lactated ringers. 500 milliLiter(s) (100 mL/Hr) IV Continuous <Continuous>  lactated ringers. 1000 milliLiter(s) (75 mL/Hr) IV Continuous <Continuous>  levothyroxine 25 MICROGram(s) Oral daily  midodrine 5 milliGRAM(s) Oral every 8 hours  pancrelipase  (CREON 24,000 Lipase Units) 3 Capsule(s) Oral three times a day with meals  phytonadione   Solution 2.5 milliGRAM(s) Oral once  piperacillin/tazobactam IVPB.. 3.375 Gram(s) IV Intermittent every 8 hours  propofol Infusion 10 MICROgram(s)/kG/Min (5.37 mL/Hr) IV Continuous <Continuous>  tamsulosin 0.4 milliGRAM(s) Oral at bedtime  vasopressin Infusion 0.03 Unit(s)/Min (4.5 mL/Hr) IV Continuous <Continuous>  vasopressin Infusion 0.04 Unit(s)/Min (6 mL/Hr) IV Continuous <Continuous>    MEDICATIONS  (PRN):  acetaminophen     Tablet .. 650 milliGRAM(s) Oral every 6 hours PRN Mild Pain (1 - 3)  guaiFENesin Oral Liquid (Sugar-Free) 100 milliGRAM(s) Oral every 6 hours PRN Cough  oxyCODONE    IR 5 milliGRAM(s) Oral every 6 hours PRN Severe Pain (7 - 10)      LABS:                        8.4    5.85  )-----------( 159      ( 07 Dec 2022 14:15 )             26.4     Hgb Trend: 8.4<--, 7.9<--, 8.4<--, 7.3<--, 7.5<--  12-07    136  |  106  |  15  ----------------------------<  134<H>  4.2   |  22  |  0.64    Ca    7.9<L>      07 Dec 2022 07:11  Phos  2.9     12-07  Mg     1.6     12-07    TPro  5.5<L>  /  Alb  2.1<L>  /  TBili  1.1  /  DBili  x   /  AST  65<H>  /  ALT  37  /  AlkPhos  266<H>  12-07    Creatinine Trend: 0.64<--, 0.61<--, 0.70<--, 0.87<--, 0.91<--, 0.89<--  PT/INR - ( 07 Dec 2022 11:11 )   PT: 32.7 sec;   INR: 2.79 ratio         PTT - ( 06 Dec 2022 07:06 )  PTT:45.0 sec    Arterial Blood Gas:  12-07 @ 14:08  7.24/41/31/18/45.4/-9.1  ABG lactate: --  Arterial Blood Gas:  12-07 @ 13:41  7.37/33/57/19/87.7/-6  ABG lactate: --        MICROBIOLOGY:     RADIOLOGY & ADDITIONAL TESTS:      ASSESSMENT AND PLAN:  Mr. Gatica is a 82 yo M (Alternate MRN [id number] Name: Cody Gatica) w/ HTN, HLD, HFrEF (EF 30-35%), VT, s/p AICD, AS s/p TAVR, CAD s/p stents, GERD, hypothyroid, anxiety, pancreatic ca s/p whipple 10/26/21, chemo and xrt with non-healing abdominal wound treated w/ HBOT and STSG 7/8/22, s/p repeat STSG wound vac placement on 9/29, PE on Xarelto, and recurrent hospitalizations for bleeding at graft site presents from [facility] with hypotension and respiratory distress re-admitted to MICU for further management.    Neuro  AOx2-3 at baseline    #Neuropathy  - Holding home duloxetine/gabapentin for now while NPO  - On home oxycodone q6hr prn pain for diffuse pain    #Anxiety  - Home Xanax 0.25mg qhs i/s/o agitation; consider resuming if needed for agitation    Cardiovascular  #Shock  -Possibly due to flash pulmonary edema vs aspiration during procedure   -C/w levo, negro, vaso and wean as tolerated  -Restart b-blocker as tolerated    #HFrEF s/p AICD  - Holding home Entresto, Toprol i/s/o shock state  - Holding home amiodarone for now  - Euvolemic on exam, CTM  - TTE (12/7) to r/o endocarditis; EF 50%  - Per EP: no need for extraction of subcutaneous AICD      #AS s/p TAVR  - Not on AC given bleed from abdominal wound site    #Pulmonary emboli  - Reportedly not on AC outpatient 2/2 recurrent bleeding at nonhealing wound site, no bleeding since hospitalized here, will start on Eliquis 5 mg BID and monitor for any signs of bleeding  - POCUS w/o RV dilation  - VA duplex (12/6) no evidence of DVT  - Prophylactic HSQ  - CBC 1500 for eliquis monitoring given portal vein thrombosis --> 8.4 on 12/06 pm    Respiratory  # Acute hypoxic RF  - Poss due to aspiration during CORBIN vs PE   - Intubated    GI/Nutrition  #Typhlitis, colitis  - C/w antibiotics     - NPO for now   - Cont home Creon    /Renal  - SCr appropriate, CTM  - Jimenez in place  - Holding home Flomax while NPO    Skin  #Nonhealing abdominal wound  - S/p skin grafting  - Recurrent hospitalizations for bleeding from site  - Plastic surgery consulted, appreciate recs  - Wound care: apply hydrogel & Aquacel pad every other day    ID  #Septic shock from polymicrobial bacteremia suspect GI source/colitis/typhlitis  - patient presented w/ septic shock (now resolved); BCx grew Strep Anginosus and Klebsiella  - Patient w/ history of MSSA bacteremia from foot wound 9/2021 s/p toe amputation, 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    Endocrine  -No active issues    #Hypothyroidism  - C/w home synthroid    Heme/Onc  #Pancreatic Cancer  - S/p whipple, chemotherapy XRT  - Localized recurrent disease identified in 11/22  - No disease modifying therapy while inpt  - Cont home Creon  - Family stating that prior plan was to seek further chemo at [facility] [place]    #Elevated INR  - ISO poor po intake  - CTM    DVT ppx  -Eliquis   -CTM for bleeding    #Anemia  - Chronic in nature  - Transfuse Hgb <7    Ethics  #Full Code MICU Accept Note    CHIEF COMPLAINT: Hypotension    HPI / INTERVAL HISTORY:     82 yo M (Alternate MRN [id number] Name: Cody Gatica) w/ HTN, HLD, HFrEF (EF 30-35%), VT, s/p AICD, AS s/p TAVR, CAD s/p stents, GERD, hypothyroid, anxiety, pancreatic ca s/p whipple 10/26/21, chemo and xrt with non-healing abdominal wound treated w/ HBOT and STSG 7/8/22, s/p repeat STSG wound vac placement on 9/29, PE on Xarelto, and recurrent hospitalizations for bleeding at graft site presents from [facility] with hypotension, tachycardia, fever, likely i/s/o septic shock, admitted to MICU for further management.    Blood cultures collected on 12/2 growing Strep anginosus and Klebsiella treated with zosyn; repeat blood cultures on 12/3 NGTD. MRSA negative s/p vanco x1. Initially required levophed for BP support but weaned off successfully and transferred to floors on midodrine 10 q8. Patient also incidentally found to have acute pulmonary emboli in the left upper lobe and possibly in the lingula; bedside POCUS without evidence of RV strain. AC was held after discussion due to chronically oozing abdominal wound; AC also held outpatient for history for portal vein thrombosis. Transferred to floors on prophylactic dose heparin. Concurrently found to have typhlitis and received treatment with zosyn.     On 12/7 RRT called for hypoxia and hypotension in PACU after CORBIN. Pt noted to be tachypneic on NRB mask and escalated to BIPAP. ABG at the time showed: pH: 7.24/41/31/18/45%, tachycardic to 140s and BP dropped to 40/20s. 1/3 of 1L IVF given but stopped in setting of CHF. In the setting of persistent hypotension and MAP in 40s, pt was started on vaso, love, and negro and intubated. Pt is being accepted to MICU for pressure support and further management.     PAST MEDICAL & SURGICAL HISTORY:  Hypertension      Hyperlipidemia      HFrEF (heart failure with reduced ejection fraction)      AICD (automatic cardioverter/defibrillator) present      Aortic stenosis      Pancreatic cancer      Portal vein thrombosis      Pulmonary embolus      Nonhealing surgical wound      H/O Whipple procedure  2021, c/b nonhealing wound      Status post skin graft using Integra wound dressing      S/P TAVR (transcatheter aortic valve replacement)          FAMILY HISTORY:      SOCIAL HISTORY: Unable to obtain d/t pt being intubated and sedated    HOME MEDICATIONS:      Allergies    No Known Allergies    Intolerances          REVIEW OF SYSTEMS:  Constitutional: No fevers, chills, weight loss, weight gain  HEENT: No vision problems, eye pain, nasal congestion, rhinorrhea, sore throat, dysphagia  CV: No chest pain, orthopnea, palpitations  Resp: No cough, dyspnea, wheezing, hemoptysis  GI: No nausea, vomiting, diarrhea, constipation, abdominal pain  : [ ] dysuria [ ] nocturia [ ] hematuria [ ] increased urinary frequency  Musculoskeletal: [ ] back pain [ ] myalgias [ ] arthralgias [ ] fracture  Skin: [ ] rash [ ] itch  Neurological: [ ] headache [ ] dizziness [ ] syncope [ ] weakness [ ] numbness  Psychiatric: [ ] anxiety [ ] depression  Endocrine: [ ] diabetes [ ] thyroid problem  Hematologic/Lymphatic: [ ] anemia [ ] bleeding problem  Allergic/Immunologic: [ ] itchy eyes [ ] nasal discharge [ ] hives [ ] angioedema  [ ] All other systems negative  [X] Unable to assess ROS because pt intubated and sedated.    OBJECTIVE:  ICU Vital Signs Last 24 Hrs  T(C): 36.4 (07 Dec 2022 13:05), Max: 36.8 (07 Dec 2022 04:34)  T(F): 97.6 (07 Dec 2022 13:05), Max: 98.2 (07 Dec 2022 04:34)  HR: 107 (07 Dec 2022 14:07) (83 - 121)  BP: 85/52 (07 Dec 2022 14:07) (48/24 - 205/120)  BP(mean): 74 (07 Dec 2022 11:55) (74 - 74)  ABP: --  ABP(mean): --  RR: 22 (07 Dec 2022 14:07) (18 - 22)  SpO2: 91% (07 Dec 2022 13:15) (87% - 97%)    O2 Parameters below as of 07 Dec 2022 13:15  Patient On (Oxygen Delivery Method): Intubated              12-06 @ 07:01  -  12-07 @ 07:00  --------------------------------------------------------  IN: 240 mL / OUT: 1105 mL / NET: -865 mL      CAPILLARY BLOOD GLUCOSE      POCT Blood Glucose.: 120 mg/dL (07 Dec 2022 13:58)      PHYSICAL EXAM:  GENERAL: +Intubated, sedated; lying comfortably in bed  HEAD: Atraumatic, normocephalic  EYES: EOMI b/l PERRLA b/l, conjunctiva and sclera clear  NECK: Supple, No JVD, No LAD  RESPIRATORY: +Intubated  CARDIOVASCULAR: Regular rate and rhythm, normal S1 and S2, no murmur/rub/gallop; No lower extremity edema  ABDOMEN: Nontender, normoactive bowel sounds, no rebound/guarding; No hepatosplenomegaly  MUSCULOSKELETAL: no clubbing or cyanosis of digits; no joint swelling or tenderness to palpation  NEURO: Non focal   PSYCH: Sedated    LINES: Right IJ    HOSPITAL MEDICATIONS:  MEDICATIONS  (STANDING):  aMIOdarone    Tablet 200 milliGRAM(s) Oral daily  chlorhexidine 4% Liquid 1 Application(s) Topical <User Schedule>  DULoxetine 60 milliGRAM(s) Oral daily  gabapentin 100 milliGRAM(s) Oral three times a day  lactated ringers. 500 milliLiter(s) (100 mL/Hr) IV Continuous <Continuous>  lactated ringers. 1000 milliLiter(s) (75 mL/Hr) IV Continuous <Continuous>  levothyroxine 25 MICROGram(s) Oral daily  midodrine 5 milliGRAM(s) Oral every 8 hours  pancrelipase  (CREON 24,000 Lipase Units) 3 Capsule(s) Oral three times a day with meals  phytonadione   Solution 2.5 milliGRAM(s) Oral once  piperacillin/tazobactam IVPB.. 3.375 Gram(s) IV Intermittent every 8 hours  propofol Infusion 10 MICROgram(s)/kG/Min (5.37 mL/Hr) IV Continuous <Continuous>  tamsulosin 0.4 milliGRAM(s) Oral at bedtime  vasopressin Infusion 0.03 Unit(s)/Min (4.5 mL/Hr) IV Continuous <Continuous>  vasopressin Infusion 0.04 Unit(s)/Min (6 mL/Hr) IV Continuous <Continuous>    MEDICATIONS  (PRN):  acetaminophen     Tablet .. 650 milliGRAM(s) Oral every 6 hours PRN Mild Pain (1 - 3)  guaiFENesin Oral Liquid (Sugar-Free) 100 milliGRAM(s) Oral every 6 hours PRN Cough  oxyCODONE    IR 5 milliGRAM(s) Oral every 6 hours PRN Severe Pain (7 - 10)      LABS:                        8.4    5.85  )-----------( 159      ( 07 Dec 2022 14:15 )             26.4     Hgb Trend: 8.4<--, 7.9<--, 8.4<--, 7.3<--, 7.5<--  12-07    136  |  106  |  15  ----------------------------<  134<H>  4.2   |  22  |  0.64    Ca    7.9<L>      07 Dec 2022 07:11  Phos  2.9     12-07  Mg     1.6     12-07    TPro  5.5<L>  /  Alb  2.1<L>  /  TBili  1.1  /  DBili  x   /  AST  65<H>  /  ALT  37  /  AlkPhos  266<H>  12-07    Creatinine Trend: 0.64<--, 0.61<--, 0.70<--, 0.87<--, 0.91<--, 0.89<--  PT/INR - ( 07 Dec 2022 11:11 )   PT: 32.7 sec;   INR: 2.79 ratio         PTT - ( 06 Dec 2022 07:06 )  PTT:45.0 sec    Arterial Blood Gas:  12-07 @ 14:08  7.24/41/31/18/45.4/-9.1  ABG lactate: --  Arterial Blood Gas:  12-07 @ 13:41  7.37/33/57/19/87.7/-6  ABG lactate: --        MICROBIOLOGY:     RADIOLOGY & ADDITIONAL TESTS:      ASSESSMENT AND PLAN:  Mr. Gatica is a 82 yo M (Alternate MRN [id number] Name: Cody Gatica) w/ HTN, HLD, HFrEF (EF 30-35%), VT, s/p AICD, AS s/p TAVR, CAD s/p stents, GERD, hypothyroid, anxiety, pancreatic ca s/p whipple 10/26/21, chemo and xrt with non-healing abdominal wound treated w/ HBOT and STSG 7/8/22, s/p repeat STSG wound vac placement on 9/29, PE on Xarelto, and recurrent hospitalizations for bleeding at graft site presents from [facility] with hypotension and respiratory distress re-admitted to MICU for further management.    Neuro  AOx2-3 at baseline  -Sedated on propofol    #Neuropathy  - Holding home duloxetine/gabapentin for now while NPO  - On home oxycodone q6hr prn pain for diffuse pain    #Anxiety  - Home Xanax 0.25mg qhs i/s/o agitation; consider resuming if needed for agitation    Cardiovascular  #Shock  - Likely distributive d/t sepsis  -Possibly due to flash pulmonary edema vs aspiration during procedure   -C/w levo, negro, vaso and wean as tolerated  -Restart b-blocker as tolerated    #HFrEF s/p AICD  - Holding home Entresto, Toprol i/s/o shock state  - Holding home amiodarone for now  - Euvolemic on exam, CTM  - TTE (12/7) to r/o endocarditis; EF 50%  - Per EP: no need for extraction of subcutaneous AICD    #AS s/p TAVR  - Not on AC given bleed from abdominal wound site    #Pulmonary emboli  - Reportedly not on AC outpatient 2/2 recurrent bleeding at nonhealing wound site, no bleeding since hospitalized here, will start on Eliquis 5 mg BID and monitor for any signs of bleeding  - POCUS w/o RV dilation  - VA duplex (12/6) no evidence of DVT    Respiratory  # Acute hypoxic RF  - Poss due to aspiration during CORBIN vs PE   - Intubated    GI/Nutrition  #Typhlitis, colitis  - C/w antibiotics     - NPO for now   - Cont home Creon    /Renal  - SCr appropriate, CTM  - Jimenez in place  - Holding home Flomax while NPO    Skin  #Nonhealing abdominal wound  - S/p skin grafting  - Recurrent hospitalizations for bleeding from site  - Plastic surgery consulted, appreciate recs  - Wound care: apply hydrogel & Aquacel pad every other day    ID  #Septic shock from polymicrobial bacteremia suspect GI source/colitis/typhlitis  - patient presented w/ septic shock (now resolved); BCx grew Strep Anginosus and Klebsiella  - Patient w/ history of MSSA bacteremia from foot wound 9/2021 s/p toe amputation, 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    Endocrine  -No active issues    #Hypothyroidism  - C/w home synthroid    Heme/Onc  #Pancreatic Cancer  - S/p whipple, chemotherapy XRT  - Localized recurrent disease identified in 11/22  - No disease modifying therapy while inpt  - Cont home Creon  - Family stating that prior plan was to seek further chemo at [facility] [place] [facility]    #Elevated INR  - ISO poor po intake  - CTM    DVT ppx  -Eliquis   -CTM for bleeding    #Anemia  - Chronic in nature  - Transfuse Hgb <7    Ethics  #Full Code MICU Accept Note    CHIEF COMPLAINT: Hypotension    HPI / INTERVAL HISTORY:     80 yo M (Alternate MRN [id number] Name: Cody Gatica) w/ HTN, HLD, HFrEF (EF 30-35%), VT, s/p AICD, AS s/p TAVR, CAD s/p stents, GERD, hypothyroid, anxiety, pancreatic ca s/p whipple 10/26/21, chemo and xrt with non-healing abdominal wound treated w/ HBOT and STSG 7/8/22, s/p repeat STSG wound vac placement on 9/29, PE on Xarelto, and recurrent hospitalizations for bleeding at graft site presents from [facility] with hypotension, tachycardia, fever, likely i/s/o septic shock, admitted to MICU for further management.    Blood cultures collected on 12/2 growing Strep anginosus and Klebsiella treated with zosyn; repeat blood cultures on 12/3 NGTD. MRSA negative s/p vanco x1. Initially required levophed for BP support but weaned off successfully and transferred to floors on midodrine 10 q8. Patient also incidentally found to have acute pulmonary emboli in the left upper lobe and possibly in the lingula; bedside POCUS without evidence of RV strain. AC was held after discussion due to chronically oozing abdominal wound; AC also held outpatient for history for portal vein thrombosis. Transferred to floors on prophylactic dose heparin. Concurrently found to have typhlitis and received treatment with zosyn.     On 12/7 RRT called for hypoxia and hypotension in PACU after CORBIN. Pt noted to be tachypneic on NRB mask and escalated to BIPAP. ABG at the time showed: pH: 7.24/41/31/18/45%, tachycardic to 140s and BP dropped to 40/20s. 1/3 of 1L IVF given but stopped in setting of CHF. In the setting of persistent hypotension and MAP in 40s, pt was started on vaso, love, and negro and intubated. Pt is being accepted to MICU for pressure support and further management.     PAST MEDICAL & SURGICAL HISTORY:  Hypertension      Hyperlipidemia      HFrEF (heart failure with reduced ejection fraction)      AICD (automatic cardioverter/defibrillator) present      Aortic stenosis      Pancreatic cancer      Portal vein thrombosis      Pulmonary embolus      Nonhealing surgical wound      H/O Whipple procedure  2021, c/b nonhealing wound      Status post skin graft using Integra wound dressing      S/P TAVR (transcatheter aortic valve replacement)          FAMILY HISTORY:      SOCIAL HISTORY: Unable to obtain d/t pt being intubated and sedated    HOME MEDICATIONS:      Allergies    No Known Allergies    Intolerances          REVIEW OF SYSTEMS:  Constitutional: No fevers, chills, weight loss, weight gain  HEENT: No vision problems, eye pain, nasal congestion, rhinorrhea, sore throat, dysphagia  CV: No chest pain, orthopnea, palpitations  Resp: No cough, dyspnea, wheezing, hemoptysis  GI: No nausea, vomiting, diarrhea, constipation, abdominal pain  : [ ] dysuria [ ] nocturia [ ] hematuria [ ] increased urinary frequency  Musculoskeletal: [ ] back pain [ ] myalgias [ ] arthralgias [ ] fracture  Skin: [ ] rash [ ] itch  Neurological: [ ] headache [ ] dizziness [ ] syncope [ ] weakness [ ] numbness  Psychiatric: [ ] anxiety [ ] depression  Endocrine: [ ] diabetes [ ] thyroid problem  Hematologic/Lymphatic: [ ] anemia [ ] bleeding problem  Allergic/Immunologic: [ ] itchy eyes [ ] nasal discharge [ ] hives [ ] angioedema  [ ] All other systems negative  [X] Unable to assess ROS because pt intubated and sedated.    OBJECTIVE:  ICU Vital Signs Last 24 Hrs  T(C): 36.4 (07 Dec 2022 13:05), Max: 36.8 (07 Dec 2022 04:34)  T(F): 97.6 (07 Dec 2022 13:05), Max: 98.2 (07 Dec 2022 04:34)  HR: 107 (07 Dec 2022 14:07) (83 - 121)  BP: 85/52 (07 Dec 2022 14:07) (48/24 - 205/120)  BP(mean): 74 (07 Dec 2022 11:55) (74 - 74)  ABP: --  ABP(mean): --  RR: 22 (07 Dec 2022 14:07) (18 - 22)  SpO2: 91% (07 Dec 2022 13:15) (87% - 97%)    O2 Parameters below as of 07 Dec 2022 13:15  Patient On (Oxygen Delivery Method): Intubated              12-06 @ 07:01  -  12-07 @ 07:00  --------------------------------------------------------  IN: 240 mL / OUT: 1105 mL / NET: -865 mL      CAPILLARY BLOOD GLUCOSE      POCT Blood Glucose.: 120 mg/dL (07 Dec 2022 13:58)      PHYSICAL EXAM:  GENERAL: +Intubated, sedated; lying comfortably in bed  HEAD: Atraumatic, normocephalic  EYES: EOMI b/l PERRLA b/l, conjunctiva and sclera clear  NECK: Supple, No JVD, No LAD  RESPIRATORY: +Intubated  CARDIOVASCULAR: Regular rate and rhythm, normal S1 and S2, no murmur/rub/gallop; No lower extremity edema  ABDOMEN: Nontender, normoactive bowel sounds, no rebound/guarding; No hepatosplenomegaly  MUSCULOSKELETAL: no clubbing or cyanosis of digits; no joint swelling or tenderness to palpation  NEURO: Non focal   PSYCH: Sedated    LINES: [facility] MEDICATIONS:  MEDICATIONS  (STANDING):  aMIOdarone    Tablet 200 milliGRAM(s) Oral daily  chlorhexidine 4% Liquid 1 Application(s) Topical <User Schedule>  DULoxetine 60 milliGRAM(s) Oral daily  gabapentin 100 milliGRAM(s) Oral three times a day  lactated ringers. 500 milliLiter(s) (100 mL/Hr) IV Continuous <Continuous>  lactated ringers. 1000 milliLiter(s) (75 mL/Hr) IV Continuous <Continuous>  levothyroxine 25 MICROGram(s) Oral daily  midodrine 5 milliGRAM(s) Oral every 8 hours  pancrelipase  (CREON 24,000 Lipase Units) 3 Capsule(s) Oral three times a day with meals  phytonadione   Solution 2.5 milliGRAM(s) Oral once  piperacillin/tazobactam IVPB.. 3.375 Gram(s) IV Intermittent every 8 hours  propofol Infusion 10 MICROgram(s)/kG/Min (5.37 mL/Hr) IV Continuous <Continuous>  tamsulosin 0.4 milliGRAM(s) Oral at bedtime  vasopressin Infusion 0.03 Unit(s)/Min (4.5 mL/Hr) IV Continuous <Continuous>  vasopressin Infusion 0.04 Unit(s)/Min (6 mL/Hr) IV Continuous <Continuous>    MEDICATIONS  (PRN):  acetaminophen     Tablet .. 650 milliGRAM(s) Oral every 6 hours PRN Mild Pain (1 - 3)  guaiFENesin Oral Liquid (Sugar-Free) 100 milliGRAM(s) Oral every 6 hours PRN Cough  oxyCODONE    IR 5 milliGRAM(s) Oral every 6 hours PRN Severe Pain (7 - 10)      LABS:                        8.4    5.85  )-----------( 159      ( 07 Dec 2022 14:15 )             26.4     Hgb Trend: 8.4<--, 7.9<--, 8.4<--, 7.3<--, 7.5<--  12-07    136  |  106  |  15  ----------------------------<  134<H>  4.2   |  22  |  0.64    Ca    7.9<L>      07 Dec 2022 07:11  Phos  2.9     12-07  Mg     1.6     12-07    TPro  5.5<L>  /  Alb  2.1<L>  /  TBili  1.1  /  DBili  x   /  AST  65<H>  /  ALT  37  /  AlkPhos  266<H>  12-07    Creatinine Trend: 0.64<--, 0.61<--, 0.70<--, 0.87<--, 0.91<--, 0.89<--  PT/INR - ( 07 Dec 2022 11:11 )   PT: 32.7 sec;   INR: 2.79 ratio         PTT - ( 06 Dec 2022 07:06 )  PTT:45.0 sec    Arterial Blood Gas:  12-07 @ 14:08  7.24/41/31/18/45.4/-9.1  ABG lactate: --  Arterial Blood Gas:  12-07 @ 13:41  7.37/33/57/19/87.7/-6  ABG lactate: --        MICROBIOLOGY:     RADIOLOGY & ADDITIONAL TESTS:      ASSESSMENT AND PLAN:  Mr. Gatica is a 80 yo M (Alternate MRN [id number] Name: Cody Gatica) w/ HTN, HLD, HFrEF (EF 30-35%), VT, s/p AICD, AS s/p TAVR, CAD s/p stents, GERD, hypothyroid, anxiety, pancreatic ca s/p whipple 10/26/21, chemo and xrt with non-healing abdominal wound treated w/ HBOT and STSG 7/8/22, s/p repeat STSG wound vac placement on 9/29, PE on Xarelto, and recurrent hospitalizations for bleeding at graft site presents from [facility] with hypotension and respiratory distress re-admitted to MICU for further management.    Neuro  AOx2-3 at baseline  -Sedated on propofol    #Neuropathy  - Holding home duloxetine/gabapentin for now while NPO  - On home oxycodone q6hr prn pain for diffuse pain    #Anxiety  - Home Xanax 0.25mg qhs i/s/o agitation; consider resuming if needed for agitation    Cardiovascular  #Shock  - Likely distributive d/t sepsis  -Possibly due to flash pulmonary edema vs aspiration during procedure   -C/w levo, negro, vaso and wean as tolerated  -Restart b-blocker as tolerated    #HFrEF s/p AICD  - Holding home Entresto, Toprol i/s/o shock state  - Holding home amiodarone for now  - Euvolemic on exam, CTM  - TTE (12/7) to r/o endocarditis; EF 50%  - Per EP: no need for extraction of subcutaneous AICD    #AS s/p TAVR  - Not on AC given bleed from abdominal wound site    #Pulmonary emboli  - Reportedly not on AC outpatient 2/2 recurrent bleeding at nonhealing wound site, no bleeding since hospitalized here, will start on Eliquis 5 mg BID and monitor for any signs of bleeding  - POCUS w/o RV dilation  - VA duplex (12/6) no evidence of DVT    Respiratory  # Acute hypoxic RF  - Poss due to aspiration during CORBIN vs PE   - Intubated    GI/Nutrition  #Typhlitis, colitis  - C/w antibiotics     - NPO for now   - Cont home Creon    /Renal  - SCr appropriate, CTM  - Jimenez in place  - Holding home Flomax while NPO    Skin  #Nonhealing abdominal wound  - S/p skin grafting  - Recurrent hospitalizations for bleeding from site  - Plastic surgery consulted, appreciate recs  - Wound care: apply hydrogel & Aquacel pad every other day    ID  #Septic shock from polymicrobial bacteremia suspect GI source/colitis/typhlitis  - patient presented w/ septic shock (now resolved); BCx grew Strep Anginosus and Klebsiella  - Patient w/ history of MSSA bacteremia from foot wound 9/2021 s/p toe amputation, 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    Endocrine  -No active issues    #Hypothyroidism  - C/w home synthroid    Heme/Onc  #Pancreatic Cancer  - S/p whipple, chemotherapy XRT  - Localized recurrent disease identified in 11/22  - No disease modifying therapy while inpt  - Cont home Creon  - Family stating that prior plan was to seek further chemo at [facility]    #Elevated INR  - ISO poor po intake  - CTM    DVT ppx  -Eliquis   -CTM for bleeding    #Anemia  - Chronic in nature  - Transfuse Hgb <7    Ethics  #Full Code MICU Accept Note    CHIEF COMPLAINT: Hypotension    HPI / INTERVAL HISTORY:     80 yo M (Alternate MRN [id number] Name: Cody Gatica) w/ HTN, HLD, HFrEF (EF 30-35%), VT, s/p AICD, AS s/p TAVR, CAD s/p stents, GERD, hypothyroid, anxiety, pancreatic ca s/p whipple 10/26/21, chemo and xrt with non-healing abdominal wound treated w/ HBOT and STSG 7/8/22, s/p repeat STSG wound vac placement on 9/29, PE on Xarelto, and recurrent hospitalizations for bleeding at graft site presents from [facility] with hypotension, tachycardia, fever, likely i/s/o septic shock, admitted to MICU for further management.    Blood cultures collected on 12/2 growing Strep anginosus and Klebsiella treated with zosyn; repeat blood cultures on 12/3 NGTD. MRSA negative s/p vanco x1. Initially required levophed for BP support but weaned off successfully and transferred to floors on midodrine 10 q8. Patient also incidentally found to have acute pulmonary emboli in the left upper lobe and possibly in the lingula; bedside POCUS without evidence of RV strain. AC was held after discussion due to chronically oozing abdominal wound; AC also held outpatient for history for portal vein thrombosis. Transferred to floors on prophylactic dose heparin. Concurrently found to have typhlitis and received treatment with zosyn.     On 12/7 RRT called for hypoxia and hypotension in PACU after CORBIN. Pt noted to be tachypneic on NRB mask and escalated to BIPAP. ABG at the time showed: pH: 7.24/41/31/18/45%, tachycardic to 140s and BP dropped to 40/20s. 1/3 of 1L IVF given but stopped in setting of CHF. In the setting of persistent hypotension and MAP in 40s, pt was started on vaso, love, and negro and intubated. Pt is being accepted to MICU for pressure support and further management.     PAST MEDICAL & SURGICAL HISTORY:  Hypertension  Hyperlipidemia  HFrEF (heart failure with reduced ejection fraction)  AICD (automatic cardioverter/defibrillator) present  Aortic stenosis  Pancreatic cancer  Portal vein thrombosis  Pulmonary embolus  Nonhealing surgical wound  H/O Whipple procedure // 2021, c/b nonhealing wound  Status post skin graft using Integra wound dressing  S/P TAVR (transcatheter aortic valve replacement)    SOCIAL HISTORY: Unable to obtain d/t pt being intubated and sedated    HOME MEDICATIONS:      Allergies    No Known Allergies    Intolerances          REVIEW OF SYSTEMS:  Constitutional: No fevers, chills, weight loss, weight gain  HEENT: No vision problems, eye pain, nasal congestion, rhinorrhea, sore throat, dysphagia  CV: No chest pain, orthopnea, palpitations  Resp: No cough, dyspnea, wheezing, hemoptysis  GI: No nausea, vomiting, diarrhea, constipation, abdominal pain  : [ ] dysuria [ ] nocturia [ ] hematuria [ ] increased urinary frequency  Musculoskeletal: [ ] back pain [ ] myalgias [ ] arthralgias [ ] fracture  Skin: [ ] rash [ ] itch  Neurological: [ ] headache [ ] dizziness [ ] syncope [ ] weakness [ ] numbness  Psychiatric: [ ] anxiety [ ] depression  Endocrine: [ ] diabetes [ ] thyroid problem  Hematologic/Lymphatic: [ ] anemia [ ] bleeding problem  Allergic/Immunologic: [ ] itchy eyes [ ] nasal discharge [ ] hives [ ] angioedema  [ ] All other systems negative  [X] Unable to assess ROS because pt intubated and sedated.    OBJECTIVE:  ICU Vital Signs Last 24 Hrs  T(C): 36.4 (07 Dec 2022 13:05), Max: 36.8 (07 Dec 2022 04:34)  T(F): 97.6 (07 Dec 2022 13:05), Max: 98.2 (07 Dec 2022 04:34)  HR: 107 (07 Dec 2022 14:07) (83 - 121)  BP: 85/52 (07 Dec 2022 14:07) (48/24 - 205/120)  BP(mean): 74 (07 Dec 2022 11:55) (74 - 74)  ABP: --  ABP(mean): --  RR: 22 (07 Dec 2022 14:07) (18 - 22)  SpO2: 91% (07 Dec 2022 13:15) (87% - 97%)    O2 Parameters below as of 07 Dec 2022 13:15  Patient On (Oxygen Delivery Method): Intubated              12-06 @ 07:01  -  12-07 @ 07:00  --------------------------------------------------------  IN: 240 mL / OUT: 1105 mL / NET: -865 mL      CAPILLARY BLOOD GLUCOSE      POCT Blood Glucose.: 120 mg/dL (07 Dec 2022 13:58)      PHYSICAL EXAM:  GENERAL: +Intubated, sedated; lying comfortably in bed  HEAD: Atraumatic, normocephalic  EYES: EOMI b/l PERRLA b/l, conjunctiva and sclera clear  NECK: Supple, No JVD, No LAD  RESPIRATORY: +Intubated  CARDIOVASCULAR: Regular rate and rhythm, normal S1 and S2, no murmur/rub/gallop; No lower extremity edema  ABDOMEN: Nontender, normoactive bowel sounds, no rebound/guarding; No hepatosplenomegaly  MUSCULOSKELETAL: no clubbing or cyanosis of digits; no joint swelling or tenderness to palpation  NEURO: Non focal   PSYCH: Sedated    LINES: [facility] [place] MEDICATIONS:  MEDICATIONS  (STANDING):  aMIOdarone    Tablet 200 milliGRAM(s) Oral daily  chlorhexidine 4% Liquid 1 Application(s) Topical <User Schedule>  DULoxetine 60 milliGRAM(s) Oral daily  gabapentin 100 milliGRAM(s) Oral three times a day  lactated ringers. 500 milliLiter(s) (100 mL/Hr) IV Continuous <Continuous>  lactated ringers. 1000 milliLiter(s) (75 mL/Hr) IV Continuous <Continuous>  levothyroxine 25 MICROGram(s) Oral daily  midodrine 5 milliGRAM(s) Oral every 8 hours  pancrelipase  (CREON 24,000 Lipase Units) 3 Capsule(s) Oral three times a day with meals  phytonadione   Solution 2.5 milliGRAM(s) Oral once  piperacillin/tazobactam IVPB.. 3.375 Gram(s) IV Intermittent every 8 hours  propofol Infusion 10 MICROgram(s)/kG/Min (5.37 mL/Hr) IV Continuous <Continuous>  tamsulosin 0.4 milliGRAM(s) Oral at bedtime  vasopressin Infusion 0.03 Unit(s)/Min (4.5 mL/Hr) IV Continuous <Continuous>  vasopressin Infusion 0.04 Unit(s)/Min (6 mL/Hr) IV Continuous <Continuous>    MEDICATIONS  (PRN):  acetaminophen     Tablet .. 650 milliGRAM(s) Oral every 6 hours PRN Mild Pain (1 - 3)  guaiFENesin Oral Liquid (Sugar-Free) 100 milliGRAM(s) Oral every 6 hours PRN Cough  oxyCODONE    IR 5 milliGRAM(s) Oral every 6 hours PRN Severe Pain (7 - 10)      LABS:                        8.4    5.85  )-----------( 159      ( 07 Dec 2022 14:15 )             26.4     Hgb Trend: 8.4<--, 7.9<--, 8.4<--, 7.3<--, 7.5<--  12-07    136  |  106  |  15  ----------------------------<  134<H>  4.2   |  22  |  0.64    Ca    7.9<L>      07 Dec 2022 07:11  Phos  2.9     12-07  Mg     1.6     12-07    TPro  5.5<L>  /  Alb  2.1<L>  /  TBili  1.1  /  DBili  x   /  AST  65<H>  /  ALT  37  /  AlkPhos  266<H>  12-07    Creatinine Trend: 0.64<--, 0.61<--, 0.70<--, 0.87<--, 0.91<--, 0.89<--  PT/INR - ( 07 Dec 2022 11:11 )   PT: 32.7 sec;   INR: 2.79 ratio         PTT - ( 06 Dec 2022 07:06 )  PTT:45.0 sec    Arterial Blood Gas:  12-07 @ 14:08  7.24/41/31/18/45.4/-9.1  ABG lactate: --  Arterial Blood Gas:  12-07 @ 13:41  7.37/33/57/19/87.7/-6  ABG lactate: --        MICROBIOLOGY:     RADIOLOGY & ADDITIONAL TESTS:      ASSESSMENT AND PLAN:  Mr. Gatica is a 80 yo M (Alternate MRN [id number] Name: Cody Gatica) w/ HTN, HLD, HFrEF (EF 30-35%), VT, s/p AICD, AS s/p TAVR, CAD s/p stents, GERD, hypothyroid, anxiety, pancreatic ca s/p whipple 10/26/21, chemo and xrt with non-healing abdominal wound treated w/ HBOT and STSG 7/8/22, s/p repeat STSG wound vac placement on 9/29, PE on Xarelto, and recurrent hospitalizations for bleeding at graft site presents from [facility] with hypotension and respiratory distress re-admitted to MICU for further management.    Neuro  AOx2-3 at baseline  -Sedated on propofol    #Neuropathy  - Holding home duloxetine/gabapentin for now while NPO  - On home oxycodone q6hr prn pain for diffuse pain    #Anxiety  - Home Xanax 0.25mg qhs i/s/o agitation; consider resuming if needed for agitation    Cardiovascular  #Shock  - Likely distributive d/t sepsis  -Possibly due to flash pulmonary edema vs aspiration during procedure   -C/w levo, negro, vaso and wean as tolerated  -Restart b-blocker as tolerated    #HFrEF s/p AICD  - Holding home Entresto, Toprol i/s/o shock state  - Holding home amiodarone for now  - Euvolemic on exam, CTM  - TTE (12/7) to r/o endocarditis; EF 50%  - Per EP: no need for extraction of subcutaneous AICD    #AS s/p TAVR  - Not on AC given bleed from abdominal wound site    #Pulmonary emboli  - Reportedly not on AC outpatient 2/2 recurrent bleeding at nonhealing wound site, no bleeding since hospitalized here, will start on Eliquis 5 mg BID and monitor for any signs of bleeding  - POCUS w/o RV dilation  - VA duplex (12/6) no evidence of DVT    Respiratory  # Acute hypoxic RF  - Poss due to aspiration during CORBIN vs PE   - Intubated    GI/Nutrition  #Typhlitis, colitis  - C/w antibiotics     - NPO for now   - Cont home Creon    /Renal  - SCr appropriate, CTM  - Jimenez in place  - Holding home Flomax while NPO    Skin  #Nonhealing abdominal wound  - S/p skin grafting  - Recurrent hospitalizations for bleeding from site  - Plastic surgery consulted, appreciate recs  - Wound care: apply hydrogel & Aquacel pad every other day    ID  #Septic shock from polymicrobial bacteremia suspect GI source/colitis/typhlitis  - patient presented w/ septic shock (now resolved); BCx grew Strep Anginosus and Klebsiella  - Patient w/ history of MSSA bacteremia from foot wound 9/2021 s/p toe amputation, 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    Endocrine  -No active issues    #Hypothyroidism  - C/w home synthroid    Heme/Onc  #Pancreatic Cancer  - S/p whipple, chemotherapy XRT  - Localized recurrent disease identified in 11/22  - No disease modifying therapy while inpt  - Cont home Creon  - Family stating that prior plan was to seek further chemo at [facility] [place] [place]    #Elevated INR  - ISO poor po intake  - CTM    DVT ppx  -Eliquis   -CTM for bleeding    #Anemia  - Chronic in nature  - Transfuse Hgb <7    Ethics  #Full Code    _____________________________________  Agree with above  Pancreatic cancer s/p Whipple with recurrence  Patient went for CORBIN R/O endocarditis and became hypoxemic and hypotensive post-procedure, was intubated and started on pressors  Now maxed on three pressors  POCU/S shows contractility Is relatively preserved, appears hypovolemic  Will give 2L LR, gave 4 amps bicarb and 1 amp calcium  Unclear etiology, but prognosis appears to be very poor. Lactate rising  Family updated at bedside, patient remains full code for now while discussions continue regarding goals of care    I have personally provided 60 minutes of critical care time, excluding time spent on separate procedures.

## 2022-12-07 NOTE — PROVIDER CONTACT NOTE (CHANGE IN STATUS NOTIFICATION) - ASSESSMENT
Upon arrival to [facility] pt noted to be in respiratory distress on 100% NRB. Unable to obtain sPO2 sat and patient lethargic. Anesthesia remained at bedside and HOC unable to be obtained. SEE RRT documentation

## 2022-12-07 NOTE — PROGRESS NOTE ADULT - PROBLEM SELECTOR PLAN 10
S/p whipple, chemotherapy XRT. Localized recurrent disease identified in 11/22  - No disease modifying therapy while inpatient  - Family stating that prior plan was to seek further chemo at [facility] [place]  - Continue home Creon

## 2022-12-07 NOTE — PROVIDER CONTACT NOTE (OTHER) - ACTION/TREATMENT ORDERED:
MD made aware, bladder scan done, straight catheterization done= 400 cc UO, will continue to monitor, DTV in 8 hrs.
None ordered at this time
MD made aware, hernandez catheter inserted as ordered, drained 400 cc nohemi urine, will closely monitor.
None ordered at this time

## 2022-12-07 NOTE — PROGRESS NOTE ADULT - ASSESSMENT
82 yo M (Alternate MRN [id number] Name: Cody Gtaica) w/ HTN, HLD, HFrEF (EF 30-35%), VT, s/p AICD, AS s/p TAVR, CAD s/p stents, GERD, hypothyroid, anxiety, pancreatic ca s/p whipple 10/26/21, chemo and xrt with non-healing abdominal wound treated w/ HBOT and STSG 7/8/22, s/p repeat STSG wound vac placement on 9/29, PE on Xarelto, and recurrent hospitalizations for bleeding at graft site presents from [facility] with septic shock 2/2 colitis, bacteremia and PE, admitted to MICU for further management, s/p pressors and abx, now transferred to medicine floors.

## 2022-12-07 NOTE — PROVIDER CONTACT NOTE (OTHER) - SITUATION
Bladder scan 323 cc of urine. Pt has yet to void since hernandez removal. Intermittent catheritization showed 400 cc of urine
Bladder scan showed 201 cc of urine. Pt was due to void at 9 am.
pt in Respiratory distress upon arrival to recovery. Dr. Peres and DAT Chacon at bedside, report to DAT Paige. pt continuing to decompensate. RRT called at 1353. SEE RAPID RESPONSE SHEET

## 2022-12-07 NOTE — PROGRESS NOTE ADULT - PROBLEM SELECTOR PLAN 2
patient presented w/ septic shock; BCx grew Strep Anginosus and Klebsiella vs colitis on CT  - now improved; off IV pressors  - on Midodrine 10mg TID; BP on the lower end of normal  - will start maintenance fluids 500cc x 5 hours  - wean off midodrine as tolerated --> 5mg   - once BP improved restart b-blocker patient presented w/ septic shock; BCx grew Strep Anginosus and Klebsiella vs colitis on CT  - now improved; off IV pressors  - on Midodrine 10mg TID; BP on the lower end of normal, add hold parameter BID if bp below 100  - will start maintenance fluids 500cc x 5 hours  - wean off midodrine as tolerated --> 5mg   - once BP improved restart b-blocker

## 2022-12-07 NOTE — PROVIDER CONTACT NOTE (OTHER) - REASON
Respiratory distress
bladder scan 201 cc of urine
bladder scan 323 cc
Pt bladder scan, with 305cc UO
Pt didn't void post hernandez catheter removal

## 2022-12-07 NOTE — CHART NOTE - NSCHARTNOTEFT_GEN_A_CORE
Called to patient's bedside for intubation.  Therapy initiated by primary medical team.    Patient Assessment:     Baseline Vital Signs:  BP: 70/40  HR: 105  RR: 30  SpO2: 100    Medications Administered:    Intubation:      [ ] Direct Laryngoscopy    [X] Video-Assisted Laryngoscopy   [ ] Fiberoptic Intubation    Blade:   Cormack-Lehane View: [X] 1     [ ] 2A     [ ] 2B     [ ] 3     [ ]  4  ETT Size: 7.5  Marking at Teeth: 23cm    Post-Intubation Vital Signs:  BP: 100/53  HR: 108  RR: 14  SpO2: 99    Positive end-tidal carbon dioxide via EasyCap, positive bilateral breath sounds.  CXR to be reviewed by primary team.  Atraumatic intubation with no complications.

## 2022-12-07 NOTE — PROGRESS NOTE ADULT - SUBJECTIVE AND OBJECTIVE BOX
CC: Patient is a 81y old  Male who presents with a chief complaint of Hypotension (07 Dec 2022 07:32)    ID following for bacteremia    Interval History/ROS: Patient remains lethargic. No complaints.    Rest of ROS negative.    Allergies  No Known Allergies    ANTIMICROBIALS:  piperacillin/tazobactam IVPB.. 3.375 every 8 hours      OTHER MEDS:  acetaminophen     Tablet .. 650 milliGRAM(s) Oral every 6 hours PRN  aMIOdarone    Tablet 200 milliGRAM(s) Oral daily  chlorhexidine 4% Liquid 1 Application(s) Topical <User Schedule>  DULoxetine 60 milliGRAM(s) Oral daily  gabapentin 100 milliGRAM(s) Oral three times a day  guaiFENesin Oral Liquid (Sugar-Free) 100 milliGRAM(s) Oral every 6 hours PRN  lactated ringers. 500 milliLiter(s) IV Continuous <Continuous>  levothyroxine 25 MICROGram(s) Oral daily  midodrine 5 milliGRAM(s) Oral every 8 hours  oxyCODONE    IR 5 milliGRAM(s) Oral every 6 hours PRN  pancrelipase  (CREON 24,000 Lipase Units) 3 Capsule(s) Oral three times a day with meals  tamsulosin 0.4 milliGRAM(s) Oral at bedtime      PE:    Vital Signs Last 24 Hrs  T(C): 36.8 (07 Dec 2022 04:34), Max: 36.8 (07 Dec 2022 04:34)  T(F): 98.2 (07 Dec 2022 04:34), Max: 98.2 (07 Dec 2022 04:34)  HR: 88 (07 Dec 2022 04:34) (83 - 91)  BP: 112/55 (07 Dec 2022 04:34) (105/57 - 112/55)  BP(mean): --  RR: 18 (07 Dec 2022 04:34) (18 - 18)  SpO2: 97% (07 Dec 2022 04:34) (96% - 97%)    Parameters below as of 07 Dec 2022 04:34  Patient On (Oxygen Delivery Method): room air    Gen: Awake, alert, NAD  CV: S1+S2 normal, no murmurs  Resp: Clear bilat, no resp distress  Abd: Soft, nontender, +BS, abd wound bandaged  Ext: No LE edema, no wounds  : Jimenez  IV/Skin: No thrombophlebitis    LABS:                          7.9    3.76  )-----------( 123      ( 07 Dec 2022 07:12 )             24.7       12-07    136  |  106  |  15  ----------------------------<  134<H>  4.2   |  22  |  0.64    Ca    7.9<L>      07 Dec 2022 07:11  Phos  2.9     12-07  Mg     1.6     12-07    TPro  5.5<L>  /  Alb  2.1<L>  /  TBili  1.1  /  DBili  x   /  AST  65<H>  /  ALT  37  /  AlkPhos  266<H>  12-07          MICROBIOLOGY:  v  .Blood Blood-Peripheral  12-03-22   No growth to date.  --  --      Clean Catch Clean Catch (Midstream)  12-02-22   No growth  --  --      .Blood Blood-Peripheral  12-02-22   Growth in aerobic bottle: Streptococcus anginosus  Growth in anaerobic bottle: Parvimonas micra "Susceptibilities not  performed"  --  Streptococcus anginosus      .Blood Blood-Peripheral  12-02-22   Growth in aerobic bottle: Klebsiella oxytoca/Raoultella ornithinolytica  Growth in anaerobic bottle: Streptococcus anginosus  Alpha hemolytic strep  (not Strep. pneumoniae or Enterococcus)  Single set isolate, possible contaminant. Contact  Microbiology if susceptibility testing clinically  indicated.  ***Blood Panel PCR results on this specimen are available  approximately 3 hours after the Gram stain result.***  Gram stain, PCR, and/or culture results may not always  correspond due to difference in methodologies.  ************************************************************  This PCR assay was performed by multiplex PCR. This  Assay tests for 66 bacterial and resistance gene targets.  Please refer to the [facility] test directory  at [email or web address] for details.  --  Blood Culture PCR  Blood Culture PCR  Klebsiella oxytoca /Raoutella ornithinolytica    Rapid RVP Result: NotDetec (12-02 @ 04:07)    RADIOLOGY:    < from: US Abdomen Limited (12.07.22 @ 09:09) >  IMPRESSION:  Small to moderate volume ascites.    Bilateral pleural effusions.      < end of copied text >

## 2022-12-07 NOTE — RAPID RESPONSE TEAM SUMMARY - NSSITUATIONBACKGROUNDRRT_GEN_ALL_CORE
80 yo M (Alternate MRN [id number] Name: Cody Gatica) w/ HTN, HLD, HFrEF (EF 30-35%), VT, s/p AICD, AS s/p TAVR, CAD s/p stents, GERD, hypothyroid, anxiety, pancreatic ca s/p whipple 10/26/21, chemo and xrt with non-healing abdominal wound treated w/ HBOT and STSG 7/8/22, s/p repeat STSG wound vac placement on 9/29, PE on Xarelto, and recurrent hospitalizations for bleeding at graft site presents from [facility] with septic shock 2/2 colitis, bacteremia and PE, admitted to MICU for further management, s/p pressors and abx.  82 yo M (Alternate MRN [id number] Name: Cody Gatica) with pmh of HTN, HLD, HFrEF (EF 30-35%), VT, s/p AICD, AS s/p TAVR, CAD s/p stents, GERD, hypothyroid, anxiety, and pancreatic ca s/p whipple 10/26/21, chemo and xrt with non-healing abdominal wound treated w/ HBOT and STSG 22, s/p repeat STSG wound vac placement on , PE on Xarelto, and recurrent hospitalizations for bleeding at graft site who presented from [facility] with septic shock 2/2 to polymicrobial bacteremia, placed on pressors, and admitted to MICU. Pt downgraded to medicine service on  as pt was off pressors and was being weaned off midodrine. Pt was being treated for acute PE with Eliquis and sepsis with zosyn. Pt went for CORBIN today to rule out endocarditis given hx of s/p AICD and TAVR.    Today RRT called for hypoxia and hypotension in PACU after CORBIN. Upon arrival at the bedside the pt was noted to be tachypneic on NRB mask. Pulse oximetry was with poor waveform (attempted on ears, fingers, and forehead). Given this, an ABG was performed demonstratin.37/33/57/19/97%. The pt was still mentating and protecting airway, therefore given proximity of BIPAP, HFNC not present, placed on this for ventilation support. After on BIPAP, repeat ABG demonstrated: pH: 7.24/41/31/18/45%. Pt noted to have sounded congested and with rhonchi, nasal suctioning attempted with minimal secretions. At this time, pt's HR elevated to 140s and BP dropped on monitor to 40/20s. The BP was repeated manually and noted to be in 60/40s. 1/3 of 1L IVF given as awaiting for pressors, stopped in setting of CHF. Afebrile. FS normal. Two pushes of negro given and negro gtt started. BP with minimal response, MAP in 40s, vaso and levo were subsequently started, and given poor MAP despite inadequate access started. Given increased work of breathing and worsening mental status and findings on ABG, family was spoken x2 and confirmed full code. Pt was intubated. ABG with lytes, CBC, CMP, mg, phos, PT, INR, and cardiac enzymes sent. EKG unchanged from  with LBBB. MICU consulted and accepted to service.  82 yo M (Alternate MRN [id number] Name: Cody Gatica) with pmh of HTN, HLD, HFrEF (EF 30-35%), VT, s/p AICD, AS s/p TAVR, CAD s/p stents, GERD, hypothyroid, anxiety, and pancreatic ca s/p whipple 10/26/21, chemo and xrt with non-healing abdominal wound treated w/ HBOT and STSG 22, s/p repeat STSG wound vac placement on , PE on Xarelto, and recurrent hospitalizations for bleeding at graft site who presented from [facility] with septic shock 2/ to polymicrobial bacteremia, placed on pressors, and admitted to MICU. Pt downgraded to medicine service on  as pt was off pressors and was being weaned off midodrine. Pt was being treated for acute PE with Eliquis and sepsis with zosyn. Pt went for CORBIN today to rule out endocarditis given hx of s/p AICD and TAVR.    Today RRT called for hypoxia and hypotension in PACU after CORBIN. Upon arrival at the bedside the pt was noted to be tachypneic on NRB mask. Pulse oximetry was with poor waveform (attempted on ears, fingers, and forehead). Given this, an ABG was performed demonstratin.37/33/57/19/97%. The pt was still mentating and protecting airway, therefore given proximity of BIPAP, HFNC not present, placed on this for ventilation support. After on BIPAP, repeat ABG demonstrated: pH: 7.24/41/31/18/45%. Pt noted to have sounded congested and with rhonchi, nasal suctioning attempted with minimal secretions. At this time, pt's HR elevated to 140s and BP dropped on monitor to 40/20s. The BP was repeated manually and noted to be in 60/40s. 1/3 of 1L IVF given as awaiting for pressors, stopped in setting of CHF. Afebrile. FS normal. Two pushes of negro given and negro gtt started. BP with minimal response, MAP in 40s, vaso and levo were subsequently started, despite inadequate access started. Given increased work of breathing and worsening mental status and findings on ABG, family was spoken x2 and confirmed full code. Pt was intubated. ABG with lytes, CBC, CMP, mg, phos, PT, INR, and cardiac enzymes sent. EKG unchanged from  with LBBB. MICU consulted and accepted to service.  82 yo M (Alternate MRN [id number] Name: Cody Gatica) with pmh of HTN, HLD, HFrEF (EF 30-35%), VT, s/p AICD, AS s/p TAVR, CAD s/p stents, GERD, hypothyroid, anxiety, and pancreatic ca s/p whipple 10/26/21, chemo and xrt with non-healing abdominal wound treated w/ HBOT and STSG 22, s/p repeat STSG wound vac placement on , PE on Xarelto, and recurrent hospitalizations for bleeding at graft site who presented from [facility] with septic shock 2/ to polymicrobial bacteremia, placed on pressors, and admitted to MICU. Pt downgraded to medicine service on  as pt was off pressors and was being weaned off midodrine. Pt was being treated for acute PE with Eliquis and sepsis with zosyn. Pt went for CORBIN today to rule out endocarditis given hx of s/p AICD and TAVR.    Today RRT called for hypoxia and hypotension in PACU after CORBIN. Upon arrival at the bedside the pt was noted to be tachypneic on NRB mask. Pulse oximetry was with poor waveform (attempted on ears, fingers, and forehead). Given this, an ABG was performed demonstratin.37/33/57/19/97%. The pt was still mentating and protecting airway, therefore given proximity of BIPAP, HFNC not present, placed on this for ventilation support. After on BIPAP, repeat ABG demonstrated: pH: 7.24/41/31/18/45%. Pt noted to have sounded congested and with rhonchi, nasal suctioning attempted with minimal secretions. At this time, pt's HR elevated to 140s and BP dropped on monitor to 40/20s. The BP was repeated manually and noted to be in 60/40s. 1/3 of 1L IVF given as awaiting for pressors, stopped in setting of CHF. Afebrile. FS normal. Two pushes of negro given and negro gtt started. BP with minimal response, MAP in 40s, vaso and levo were subsequently started, despite inadequate access started. Given increased work of breathing and worsening mental status and findings on ABG, family was spoken x2 and confirmed full code. Pt was intubated. CXR ordered. ABG with lytes, CBC, CMP, mg, phos, PT, INR, and cardiac enzymes sent. EKG unchanged from  with LBBB. MICU consulted and accepted to service.

## 2022-12-08 NOTE — CHART NOTE - NSCHARTNOTESELECT_GEN_ALL_CORE
Electrophysiology fellow/Event Note
Transfer Note
Death Note/Event Note
Event Note
MAR Accept Note/Transfer Note
MICU Accept/Transfer Note

## 2022-12-08 NOTE — CHART NOTE - NSCHARTNOTEFT_GEN_A_CORE
Pt remains on three pressors MAP ~ 50 persistently with pH 7.04 despite bicarb drip and lactic greater than 16. No urine output. Family was waiting for son to come from [place] and he is now at bedside. Had extensive conversation with family members. Explained that with low MAPS he is not adequately perfusing his vital organs. Explained that prognosis is poor and if pH drops further most of his medication will not adequately work. Explained he is not a dialysis candidate as he would not tolerate it because on three pressors BP is still tenuous.     Pt family feels it would be helpful if they could speak to the attending who performed CORBIN, we did explain that pt decompensated after but we will reach out to them. All other questions answered.     Pt family now all in agreeance with DNR. Son asks if they can verbally consult as he feels guilty to sign the form and doesn't want to seem like he is "giving up on him". RN Maricruz witness to DNR and MOLST placed. Cardio fellow turned off defibrillator function.     30min of critical care time spent at bedside not including separate billable procedures/teaching.

## 2022-12-08 NOTE — DISCHARGE NOTE FOR THE EXPIRED PATIENT - FINDINGS/TREATMENT
Conclusions:  1. Normal mitral valve. Mild mitral regurgitation.  2. Transcatheter aortic valve replacement. The aortic  velocity and gradients are probably underestimated. No  aortic valve regurgitation seen.  3. No left atrial or left atrial appendage thrombus.  4. Mild global left ventricular systolic dysfunction.Mild  global hypokinesis with visual LVEF of 45-50%.  5. No RA/RAA clot.  6. Normal right ventricular size and function.  7. Normal tricuspid valve. No tricuspid regurgitation.  8. No pericardial effusion seen.  No evidence of valvular vegetation or abscess.  *** Compared with echocardiogram of 11/15/2022, no  significant changes noted. IMPRESSION:  Prelim:  Complete opacification of the left hemithorax, new from 12/2/2022.  Endotracheal tube at T2 level.  Left IJ central venous catheter with distal tip at the junction of the   left brachiocephalic vein and SVC.

## 2022-12-08 NOTE — PROGRESS NOTE ADULT - PROVIDER SPECIALTY LIST ADULT
Critical Care
MICU
MICU
Infectious Disease
MICU
Internal Medicine
MICU
Internal Medicine
Internal Medicine

## 2022-12-08 NOTE — CHART NOTE - NSCHARTNOTEFT_GEN_A_CORE
80 yo M (Alternate MRN [id number] Name: Cody Gatica) w/ HTN, HLD, HFrEF (EF 30-35%), VT, s/p AICD, AS s/p TAVR, CAD s/p stents, GERD, hypothyroid, anxiety, pancreatic ca s/p whipple 10/26/21, chemo and xrt with non-healing abdominal wound treated w/ HBOT and STSG 7/8/22, s/p repeat STSG wound vac placement on 9/29, PE on Xarelto, and recurrent hospitalizations for bleeding at graft site presents from [facility] with hypotension, tachycardia, fever, likely i/s/o septic shock, admitted to MICU for further management.    Patient was made DNR/DNI by family in light of poor prognosis.  EP was consulted to turn off shock therapy.   Patient has subcutaneous ICD  A219 Emblem MRI S-ICD  Shock therapy was turned off after confirming wishes with the family who was at bedside.   Summary report was placed in patient chart.    Karyn Rose MD  Cardiology fellow

## 2022-12-08 NOTE — CHART NOTE - NSCHARTNOTEFT_GEN_A_CORE
DEATH NOTE    Called to bedside to evaluate the patient for asystole.     On physical exam, patient did not respond to verbal or noxious stimuli.  No spontaneous respirations.  Absent heart and breath sounds.  Absent radial and carotid pulses.   Pupils are fixed and dilated, no corneal reflex.  EKG rhythm strip shows asystole.   Patient pronounced dead at 8:49AM.  Attending notified.    -Poly Dickens, PGY-3  Internal Medicine

## 2022-12-08 NOTE — PROGRESS NOTE ADULT - SUBJECTIVE AND OBJECTIVE BOX
Pretty Covington  PGY-1 | Internal Medicine      INTERVAL HPI/OVERNIGHT EVENTS:    SUBJECTIVE: Patient seen and examined at bedside.     CONSTITUTIONAL: No weakness, fevers or chills  EYES/ENT: No visual changes;  No vertigo or throat pain   NECK: No pain or stiffness  RESPIRATORY: No cough, wheezing, hemoptysis; No shortness of breath  CARDIOVASCULAR: No chest pain or palpitations  GASTROINTESTINAL: No abdominal or epigastric pain. No nausea, vomiting, or hematemesis; No diarrhea or constipation. No melena or hematochezia.  GENITOURINARY: No dysuria, frequency or hematuria  NEUROLOGICAL: No numbness or weakness  SKIN: No itching, rashes    OBJECTIVE:    VITAL SIGNS:  ICU Vital Signs Last 24 Hrs  T(C): 36.8 (08 Dec 2022 04:00), Max: 36.8 (07 Dec 2022 11:55)  T(F): 98.2 (08 Dec 2022 04:00), Max: 98.2 (08 Dec 2022 04:00)  HR: 91 (08 Dec 2022 06:00) (88 - 134)  BP: 85/52 (07 Dec 2022 14:07) (48/24 - 205/120)  BP(mean): 74 (07 Dec 2022 11:55) (74 - 74)  ABP: 85/37 (08 Dec 2022 06:00) (44/21 - 135/59)  ABP(mean): 51 (08 Dec 2022 06:00) (41 - 81)  RR: 23 (08 Dec 2022 06:00) (16 - 28)  SpO2: 87% (08 Dec 2022 06:00) (86% - 98%)    O2 Parameters below as of 07 Dec 2022 20:00  Patient On (Oxygen Delivery Method): ventilator    O2 Concentration (%): 100      Mode: AC/ CMV (Assist Control/ Continuous Mandatory Ventilation), RR (machine): 14, TV (machine): 500, FiO2: 100, PEEP: 8, ITime: 1, MAP: 13, PIP: 23     @ 07:01  -   @ 07:00  --------------------------------------------------------  IN: 240 mL / OUT: 1105 mL / NET: -865 mL     @ 07:01  -   @ 06:52  --------------------------------------------------------  IN: 8500.2 mL / OUT: 200 mL / NET: 8300.2 mL      CAPILLARY BLOOD GLUCOSE      POCT Blood Glucose.: 120 mg/dL (07 Dec 2022 13:58)      PHYSICAL EXAM:    General: NAD  HEENT: NC/AT; PERRL, clear conjunctiva  Neck: supple  Respiratory: CTA b/l  Cardiovascular: +S1/S2; RRR  Abdomen: soft, NT/ND; +BS x4  Extremities: WWP, 2+ peripheral pulses b/l; no LE edema  Skin: normal color and turgor; no rash  Neurological:    MEDICATIONS:  MEDICATIONS  (STANDING):  aMIOdarone    Tablet 200 milliGRAM(s) Oral daily  chlorhexidine 0.12% Liquid 15 milliLiter(s) Oral Mucosa every 12 hours  chlorhexidine 4% Liquid 1 Application(s) Topical <User Schedule>  chlorhexidine 4% Liquid 1 Application(s) Topical <User Schedule>  DULoxetine 60 milliGRAM(s) Oral daily  gabapentin 100 milliGRAM(s) Oral three times a day  ketamine Infusion. 2 mG/kG/Hr (17.9 mL/Hr) IV Continuous <Continuous>  levothyroxine 25 MICROGram(s) Oral daily  norepinephrine Infusion 0.05 MICROgram(s)/kG/Min (4.2 mL/Hr) IV Continuous <Continuous>  pancrelipase  (CREON 24,000 Lipase Units) 3 Capsule(s) Oral three times a day with meals  phenylephrine    Infusion 6.5 MICROgram(s)/kG/Min (109 mL/Hr) IV Continuous <Continuous>  piperacillin/tazobactam IVPB.. 3.375 Gram(s) IV Intermittent every 8 hours  propofol Infusion 10 MICROgram(s)/kG/Min (5.37 mL/Hr) IV Continuous <Continuous>  sodium bicarbonate  Infusion 0.251 mEq/kG/Hr (150 mL/Hr) IV Continuous <Continuous>  tamsulosin 0.4 milliGRAM(s) Oral at bedtime  vasopressin Infusion 0.04 Unit(s)/Min (6 mL/Hr) IV Continuous <Continuous>    MEDICATIONS  (PRN):  acetaminophen     Tablet .. 650 milliGRAM(s) Oral every 6 hours PRN Mild Pain (1 - 3)  guaiFENesin Oral Liquid (Sugar-Free) 100 milliGRAM(s) Oral every 6 hours PRN Cough  oxyCODONE    IR 5 milliGRAM(s) Oral every 6 hours PRN Severe Pain (7 - 10)      ALLERGIES:  Allergies    No Known Allergies    Intolerances        LABS:                        8.7    15.21 )-----------( 132      ( 07 Dec 2022 23:55 )             29.6     12-    140  |  105  |  15  ----------------------------<  121<H>  4.4   |  <10<LL>  |  1.12    Ca    7.9<L>      07 Dec 2022 23:55  Phos  5.2     12-  Mg     1.6     12-    TPro  5.0<L>  /  Alb  1.8<L>  /  TBili  1.6<H>  /  DBili  x   /  AST  1309<H>  /  ALT  348<H>  /  AlkPhos  285<H>  12-07    PT/INR - ( 07 Dec 2022 23:55 )   PT: 56.7 sec;   INR: 4.81 ratio         PTT - ( 07 Dec 2022 15:38 )  PTT:37.2 sec  Urinalysis Basic - ( 07 Dec 2022 16:17 )    Color: Yellow / Appearance: Slightly Turbid / S.035 / pH: x  Gluc: x / Ketone: Trace  / Bili: Negative / Urobili: Negative   Blood: x / Protein: 30 mg/dL / Nitrite: Negative   Leuk Esterase: Moderate / RBC: 41 /hpf / WBC 25 /HPF   Sq Epi: x / Non Sq Epi: 2 /hpf / Bacteria: Negative        RADIOLOGY & ADDITIONAL TESTS: Reviewed. Pretty Covington  PGY-1 | Internal Medicine      INTERVAL HPI/OVERNIGHT EVENTS: Pt remains on three pressors MAP ~ 50 persistently with pH 7.04 despite bicarb drip and lactic greater than 16. No urine output.     SUBJECTIVE: Patient seen and examined at bedside.     CONSTITUTIONAL: No weakness, fevers or chills  EYES/ENT: No visual changes;  No vertigo or throat pain   NECK: No pain or stiffness  RESPIRATORY: No cough, wheezing, hemoptysis; No shortness of breath  CARDIOVASCULAR: No chest pain or palpitations  GASTROINTESTINAL: No abdominal or epigastric pain. No nausea, vomiting, or hematemesis; No diarrhea or constipation. No melena or hematochezia.  GENITOURINARY: No dysuria, frequency or hematuria  NEUROLOGICAL: No numbness or weakness  SKIN: No itching, rashes    OBJECTIVE:    VITAL SIGNS:  ICU Vital Signs Last 24 Hrs  T(C): 36.8 (08 Dec 2022 04:00), Max: 36.8 (07 Dec 2022 11:55)  T(F): 98.2 (08 Dec 2022 04:00), Max: 98.2 (08 Dec 2022 04:00)  HR: 91 (08 Dec 2022 06:00) (88 - 134)  BP: 85/52 (07 Dec 2022 14:07) (48/24 - 205/120)  BP(mean): 74 (07 Dec 2022 11:55) (74 - 74)  ABP: 85/37 (08 Dec 2022 06:00) (44/21 - 135/59)  ABP(mean): 51 (08 Dec 2022 06:00) (41 - 81)  RR: 23 (08 Dec 2022 06:00) (16 - 28)  SpO2: 87% (08 Dec 2022 06:00) (86% - 98%)    O2 Parameters below as of 07 Dec 2022 20:00  Patient On (Oxygen Delivery Method): ventilator    O2 Concentration (%): 100      Mode: AC/ CMV (Assist Control/ Continuous Mandatory Ventilation), RR (machine): 14, TV (machine): 500, FiO2: 100, PEEP: 8, ITime: 1, MAP: 13, PIP: 23     @ 07:01  -   @ 07:00  --------------------------------------------------------  IN: 240 mL / OUT: 1105 mL / NET: -865 mL     @ 07:01  -   @ 06:52  --------------------------------------------------------  IN: 8500.2 mL / OUT: 200 mL / NET: 8300.2 mL      CAPILLARY BLOOD GLUCOSE      POCT Blood Glucose.: 120 mg/dL (07 Dec 2022 13:58)      PHYSICAL EXAM:    General: NAD  HEENT: NC/AT; PERRL, clear conjunctiva  Neck: supple  Respiratory: CTA b/l  Cardiovascular: +S1/S2; RRR  Abdomen: soft, NT/ND; +BS x4  Extremities: WWP, 2+ peripheral pulses b/l; no LE edema  Skin: normal color and turgor; no rash  Neurological:    MEDICATIONS:  MEDICATIONS  (STANDING):  aMIOdarone    Tablet 200 milliGRAM(s) Oral daily  chlorhexidine 0.12% Liquid 15 milliLiter(s) Oral Mucosa every 12 hours  chlorhexidine 4% Liquid 1 Application(s) Topical <User Schedule>  chlorhexidine 4% Liquid 1 Application(s) Topical <User Schedule>  DULoxetine 60 milliGRAM(s) Oral daily  gabapentin 100 milliGRAM(s) Oral three times a day  ketamine Infusion. 2 mG/kG/Hr (17.9 mL/Hr) IV Continuous <Continuous>  levothyroxine 25 MICROGram(s) Oral daily  norepinephrine Infusion 0.05 MICROgram(s)/kG/Min (4.2 mL/Hr) IV Continuous <Continuous>  pancrelipase  (CREON 24,000 Lipase Units) 3 Capsule(s) Oral three times a day with meals  phenylephrine    Infusion 6.5 MICROgram(s)/kG/Min (109 mL/Hr) IV Continuous <Continuous>  piperacillin/tazobactam IVPB.. 3.375 Gram(s) IV Intermittent every 8 hours  propofol Infusion 10 MICROgram(s)/kG/Min (5.37 mL/Hr) IV Continuous <Continuous>  sodium bicarbonate  Infusion 0.251 mEq/kG/Hr (150 mL/Hr) IV Continuous <Continuous>  tamsulosin 0.4 milliGRAM(s) Oral at bedtime  vasopressin Infusion 0.04 Unit(s)/Min (6 mL/Hr) IV Continuous <Continuous>    MEDICATIONS  (PRN):  acetaminophen     Tablet .. 650 milliGRAM(s) Oral every 6 hours PRN Mild Pain (1 - 3)  guaiFENesin Oral Liquid (Sugar-Free) 100 milliGRAM(s) Oral every 6 hours PRN Cough  oxyCODONE    IR 5 milliGRAM(s) Oral every 6 hours PRN Severe Pain (7 - 10)      ALLERGIES:  Allergies    No Known Allergies    Intolerances        LABS:                        8.7    15.21 )-----------( 132      ( 07 Dec 2022 23:55 )             29.6     12    140  |  105  |  15  ----------------------------<  121<H>  4.4   |  <10<LL>  |  1.12    Ca    7.9<L>      07 Dec 2022 23:55  Phos  5.2       Mg     1.6         TPro  5.0<L>  /  Alb  1.8<L>  /  TBili  1.6<H>  /  DBili  x   /  AST  1309<H>  /  ALT  348<H>  /  AlkPhos  285<H>  12    PT/INR - ( 07 Dec 2022 23:55 )   PT: 56.7 sec;   INR: 4.81 ratio         PTT - ( 07 Dec 2022 15:38 )  PTT:37.2 sec  Urinalysis Basic - ( 07 Dec 2022 16:17 )    Color: Yellow / Appearance: Slightly Turbid / S.035 / pH: x  Gluc: x / Ketone: Trace  / Bili: Negative / Urobili: Negative   Blood: x / Protein: 30 mg/dL / Nitrite: Negative   Leuk Esterase: Moderate / RBC: 41 /hpf / WBC 25 /HPF   Sq Epi: x / Non Sq Epi: 2 /hpf / Bacteria: Negative        RADIOLOGY & ADDITIONAL TESTS: Reviewed.

## 2022-12-08 NOTE — PROGRESS NOTE ADULT - ATTENDING COMMENTS
Agree with above  Patient remained on three pressors at high doses  Family made decision to make patient DNR with capped pressors and no unnecessary lab draws. Persistent acidosis  This AM, patient went into asystole during rounds.  Time of death was 0849. Family was at bedside. Comfort was offered.

## 2022-12-08 NOTE — DISCHARGE NOTE FOR THE EXPIRED PATIENT - HOSPITAL COURSE
82 yo M (Alternate MRN [id number] Name: Cody Gatica) w/ HTN, HLD, HFrEF (EF 30-35%), VT, s/p AICD, AS s/p TAVR, CAD s/p stents, GERD, hypothyroid, anxiety, pancreatic ca s/p whipple 10/26/21, chemo and xrt with non-healing abdominal wound treated w/ HBOT and STSG 7/8/22, s/p repeat STSG wound vac placement on 9/29, PE on Xarelto, and recurrent hospitalizations for bleeding at graft site presents from [facility] with hypotension, tachycardia, fever, likely i/s/o septic shock, admitted to MICU for further management.    Blood cultures collected on 12/2 growing Strep anginosus and Klebsiella treated with zosyn; repeat blood cultures on 12/3 NGTD. MRSA negative s/p vanco x1. Initially required levophed for BP support but weaned off successfully and transferred to floors on midodrine 10 q8. Patient also incidentally found to have acute pulmonary emboli in the left upper lobe and possibly in the lingula; bedside POCUS without evidence of RV strain. AC was held after discussion due to chronically oozing abdominal wound; AC also held outpatient for history for portal vein thrombosis. Transferred to floors on prophylactic dose heparin. Concurrently found to have typhlitis and received treatment with zosyn.     On 12/7 RRT called for hypoxia and hypotension in PACU after CORBIN to r/o endocarditis and became hypoxemic and hypotensive post-procedure. Pt noted to be tachypneic on NRB mask and escalated to BIPAP. ABG at the time showed: pH: 7.24/41/31/18/45%, tachycardic to 140s and BP dropped to 40/20s. 1/3 of 1L IVF given but stopped in setting of CHF. In the setting of persistent hypotension and MAP in 40s, pt was started on vaso, levo, and negro and intubated. Pt is being accepted to MICU for pressure support and further management.     In MICU, a central line was placed in L IJ and pt received max doses on all three pressors vaso, levo, and negro. Lactate continued to rise despite 2L LR, 4 amps bicarb, and 1 amp Ca. Pt remained on three pressors with a MAP ~50 persistently w/ pH 7.40 and lactic >16 and no urine output.  80 yo M (Alternate MRN [id number] Name: Cody Gatica) w/ HTN, HLD, HFrEF (EF 30-35%), VT, s/p AICD, AS s/p TAVR, CAD s/p stents, GERD, hypothyroid, anxiety, pancreatic ca s/p whipple 10/26/21, chemo and xrt with non-healing abdominal wound treated w/ HBOT and STSG 7/8/22, s/p repeat STSG wound vac placement on 9/29, PE on Xarelto, and recurrent hospitalizations for bleeding at graft site presents from [facility] with hypotension, tachycardia, fever, likely i/s/o septic shock, admitted to MICU for further management.    Blood cultures collected on 12/2 growing Strep anginosus and Klebsiella treated with zosyn; repeat blood cultures on 12/3 NGTD. MRSA negative s/p vanco x1. Initially required levophed for BP support but weaned off successfully and transferred to floors on midodrine 10 q8. Patient also incidentally found to have acute pulmonary emboli in the left upper lobe and possibly in the lingula; bedside POCUS without evidence of RV strain. AC was held after discussion due to chronically oozing abdominal wound; AC also held outpatient for history for portal vein thrombosis. Transferred to medicine floor on prophylactic dose heparin on 11/. Concurrently found to have typhlitis and received treatment with zosyn.     On 12/7 RRT called for hypoxia and hypotension in PACU after CORBIN to r/o endocarditis and became hypoxemic and hypotensive post-procedure. Pt noted to be tachypneic on NRB mask and escalated to BIPAP. ABG at the time showed: pH: 7.24/41/31/18/45%, tachycardic to 140s and BP dropped to 40/20s. 1/3 of 1L IVF given but stopped in setting of CHF. In the setting of persistent hypotension and MAP in 40s, pt was started on vaso, levo, and negro and intubated. Pt is being accepted to MICU for pressure support and further management.     In MICU, a central line was placed in L IJ and pt received max doses on all three pressors vaso, levo, and negro. Lactate continued to rise despite 2L LR, 4 amps bicarb, and 1 amp Ca. Pt remained on three pressors with a MAP ~50 persistently w/ pH 7.40 and lactic >16 and no urine output.  82 yo M (Alternate MRN [id number] Name: Cody Gatica) w/ HTN, HLD, HFrEF (EF 30-35%), VT, s/p AICD, AS s/p TAVR, CAD s/p stents, GERD, hypothyroid, anxiety, pancreatic ca s/p whipple 10/26/21, chemo and xrt with non-healing abdominal wound treated w/ HBOT and STSG 7/8/22, s/p repeat STSG wound vac placement on 9/29, PE on Xarelto, and recurrent hospitalizations for bleeding at graft site presents from [facility] with hypotension, tachycardia, fever, likely i/s/o septic shock, admitted to MICU for further management.    Blood cultures collected on 12/2 growing Strep anginosus and Klebsiella treated with zosyn; repeat blood cultures on 12/3 NGTD. MRSA negative s/p vanco x1. Initially required levophed for BP support but weaned off successfully and transferred to floors on midodrine 10 q8. Patient also incidentally found to have acute pulmonary emboli in the left upper lobe and possibly in the lingula; bedside POCUS without evidence of RV strain. AC was held after discussion due to chronically oozing abdominal wound; AC also held outpatient for history for portal vein thrombosis. Transferred to medicine floor on prophylactic dose heparin on 12/5. Concurrently found to have typhlitis and received treatment with zosyn.     On 12/7 RRT called for hypoxia and hypotension in PACU after CORBIN to r/o endocarditis and became hypoxemic and hypotensive post-procedure. Pt noted to be tachypneic on NRB mask and escalated to BIPAP. ABG at the time showed: pH: 7.24/41/31/18/45%, tachycardic to 140s and BP dropped to 40/20s. 1/3 of 1L IVF given but stopped in setting of CHF. In the setting of persistent hypotension and MAP in 40s, pt was started on vaso, levo, and negro and intubated. Pt was accepted to MICU again for pressure support and further management.     In MICU, a central line was placed in L IJ and pt received max doses on all three pressors vaso, levo, and negro. Lactate continued to rise despite 2L LR, 4 amps bicarb, and 1 amp Ca. Pt remained on three pressors with a MAP ~50 persistently w/ pH 7.40 and lactic >16 and no urine output.

## 2022-12-08 NOTE — PROGRESS NOTE ADULT - ASSESSMENT
82 yo M (Alternate MRN [id number] Name: Cody Gatica) w/ HTN, HLD, HFrEF (EF 30-35%), VT, s/p AICD, AS s/p TAVR, CAD s/p stents, GERD, hypothyroid, anxiety, pancreatic ca s/p whipple 10/26/21, chemo and xrt with non-healing abdominal wound treated w/ HBOT and STSG 7/8/22, s/p repeat STSG wound vac placement on 9/29, PE on Xarelto, and recurrent hospitalizations for bleeding at graft site presents from [facility] with hypotension, tachycardia, fever, likely i/s/o septic shock, admitted to MICU for further management.    #NEURO  //Encephalopathy  - Improved, occasional confusion but overall much improved mental status, AOx3 at baseline  - Likely i/s/o sepsis  - Neuro checks per ICU protocol    //Neuropathy  - Holding home duloxetine/gabapentin for now while NPO  - On home oxycodone q6hr prn pain for diffuse pain    //Anxiety  - Home Xanax 0.25mg qhs i/s/o agitation; consider resuming if needed for agitation    #SKIN  //Nonhealing abdominal wound  - S/p skin grafting  - Recurrent hospitalizations for bleeding from site  - Plastic surgery consulted, appreciate recs  - Wound care: apply hydrogel & Aquacel pad every other day    #CARDIOVASCULAR  //Shock state, likely septic; improving   - Management of sepsis as per below  - off levophed  - C/w midodrine 10mg q8hr    //HFrEF s/p AICD  - Holding home Entresto, Toprol i/s/o shock state  - Holding home amiodarone for now  - Euvolemic on exam, CTM  - Follow up TTE  - Spoke with EP: no need for extraction of subcutaneous AICD    //AS s/p TAVR  - Not on AC given bleed from wound site    //Pulmonary emboli  - Reportedly not on AC outpatient 2/2 recurrent bleeding at nonhealing wound site, no bleeding since hospitalized here, will start on Eliquis 5 mg BID and monitor for any signs of bleeding  - POCUS w/o RV dilation      #RESPIRATORY  - O2 sat appropriate on RA  - No active issues    #GI/FEN  - Pureed diet  - Continue home Creon    //Typhlitis, colitis  - Continue with antibiotics as per below    #RENAL/METABOLIC  - SCr appropriate, CTM  - Hernandez in place  - Holding home Flomax while NPO    #HEME/ONC  //Pancreatic cancer  - S/p whipple, chemotherapy XRT  - Localized recurrent disease identified in 11/22  - No disease modifying therapy while inpatient  - Family stating that prior [place] was [place] [facility] [place]    //Elevated INR  - I/s/o poor po  - CTM     //DVT ppx  - Will transition to Eliquis 5 mg BID   - Continue to monitor for bleeding    //Anemia  - Chronic in nature  - Transfuse hgb <7    #ENDOCRINE  - No active issues    //Hypothyroidism  - Continue home Synthroid    #ID  //Septic shock most likely from bacteremia (BCx grew streptococcus)  - Patient w/ history of MSSA bacteremia from foot wound 9/2021, 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  - S/p toe amputation by podiatry at that time  - Also with history of VRE bacteremia of unclear source  - CTAP w/ typhlitis, colitis  - Known sacral ulcer & abdominal wound, both appear clean  - Urinalysis WNL  - S/p Vanc/Zosyn in ED  - Continue Zosyn (12/2-)  - Levophed dc-ed; c/w midodrine  - Urine cultures NGTD  - F/u blood culture (12/2) grew streptococcus, MRSA swab (12/2)  - Repeat blood culture today (12/3)  - Send procal, ESR, CRP  - Podiatry consult  - Sputum culture  - D/C hernandez     #ETHICS  - Spoke with daughter Kristi 12/2 who stated that she believe patient would want to be DNR/DNI though she is not the decision maker. Updated daughter Stacie later in the day. We discussed his overall poor prognosis with multiple hospitalizations due to multiple infections and complications from his cancer. She discussed their initial plan [place] [facility] [place] but also expressed that she would mostly just want him to be able to come home, potentially with hospice level care. At this point, she states that she would like to treat this aggressively with aim of getting him out of the hospital. She states that he previously stated that he would want "everything done", so she would want him to be FULL CODE at this time.

## 2022-12-21 NOTE — PATIENT PROFILE ADULT - HAS THE PATIENT RECEIVED THE INFLUENZA VACCINE THIS SEASON?
NOTES FOR VISIT:   - Continue Taclonex suspension to the areas of psoriasis  - We discussed the benign nature of the seborrheic keratosis of the left upper lateral abdomen with cryo, patient deferred treatment at this time.   - We discussed the nature of the benign lesion(s) and removal options if they become symptomatic including painful, itchy, bothersome or start bleeding.  - Recommend skin exam in 1 year.     No images are attached to the encounter.      FOLLOW-UP: No follow-ups on file.    Vanesa was seen today for office visit.    Diagnoses and all orders for this visit:    History of basal cell carcinoma of the left posterior lateral calf, re-exc 07/27/2021    History of basal carcinoma excised from patient's right chest in April of 2021    History of dysplastic nevus, right thoracic back 3/2021    Scars    Solar lentiginosis    Seborrheic keratosis    Numerous moles    Psoriasis    Seborrheic keratosis, inflamed       There are no discontinued medications.     Chief Complaint   Patient presents with   • Office Visit     Yearly skin exam        HISTORY: Vanesa is here today for:  - yearly skin exam  - no concerns  - They have a history of skin cancer as noted below. The site(s) of the previous skin cancer do not bother them. The scars are not tight, painful or itchy.    PAST DERMATOLOGY-SPECIFIC HISTORY:  - BCC excised from the right chest in April of 2021  - BCC excised from the left posterior lateral calf in July of 2021  - Dysplastic nevus removed with shave biopsy from the right thoracic back in March of 2021  - Psoriasis of the scalp and ears treated with Taclonex    ACTIVE DERMATOLOGY CONDITIONS AND PRESCRIPTIONS:  - Taclonex suspension (psoriasis)  - Triamcinolone 0.1% cream (adhesive allergy/Tegaderm allergy)    PAST MEDICAL HISTORY, PAST SURGICAL HISTORY, SOCIAL HISTORY, AND FAMILY HISTORY WERE REVIEWED.    PHYSICAL EXAMINATION: CONSTITUTIONAL:  Vanesa is a 62 year old female who is well developed,  well nourished, in no acute distress.  There were no vitals taken for this visit.. EYES: Inspection of the conjunctivae and eyelids reveals no abnormalities. EARS, NOSE, MOUTH, THROAT: Inspection of the lips reveals no abnormality. NECK: Examination of the thyroid reveals no enlargement, tenderness, or masses. LYMPH NODES: There is no palpable lymphadenopathy in the inguinal, axillary or cervical region. EXTREMITIES: Examination of the fingers and toes reveals no clubbing, cyanosis, or inflammation. CARDIOVASCULAR:  Capillary refill is normal in the upper and lower extremities. Examination of the peripheral vascular system reveals no cyanosis.  GASTROINTESTINAL: The abdomen is soft without masses. NEUROLOGIC AND PSYCHIATRIC: She has a normal mood and affect. SKIN: Palpation of the scalp and inspection of the hair of the scalp, eyebrows, face, chest and extremities is without abnormality. The finger and toenails are without abnormalities. Complete examination, including palpation and careful visual examination of the feet, legs, buttocks, back, chest, abdomen, arms, hands, neck, scalp, and face revealed no other significant abnormality or eruption. I noted:    - Vanesa had well-healed excision scar(s) at the site of their previously removed skin cancers without any obvious residua or recurrence.  - She had 4-12 mm, regularly-pigmented, brown macules on her face, chest, back, shoulders, arms and legs.  - They had multiple 2-20 mm, scaly, brown lesion(s) with obvious horn pearls without  surrounding erythema on their trunk, head and neck, arms and legs, more than 100 lesions total.  - They had 2-10 mm well-demarcated, regularly pigmented brown macules and papules on their trunk, arms, legs, neck, hands, feet and face, more than 100 lesions total.  - Vanesa had 5 cm scaly, red plaques on her occipital scalp and thin scaly plaques in the bilateral ears.  Approximately <10 percent of their body surface area was involved  with psoriasis.   - She had a 12 mm, scaly, brown lesion(s) with obvious horn pearls with  surrounding erythema on her left upper lateral abdomen.     On 12/21/2022, MIKAYLA SHELBY LPN scribed the services personally performed by Dr. Gino Guerra MD      The documentation recorded by the scribe accurately and completely reflects the service(s) I personally performed and the decisions made by me.          yes...

## 2023-01-02 PROBLEM — S31.109D OPEN WOUND OF ABDOMEN, SUBSEQUENT ENCOUNTER: Status: ACTIVE | Noted: 2022-01-01

## 2023-01-02 NOTE — REASON FOR VISIT
[Follow-Up: _____] : a [unfilled] follow-up visit [Family Member] : family member [FreeTextEntry1] : here today pre operatively accompanied by his sister. Pt has upcoming surgery on 7/8/22

## 2023-01-18 ENCOUNTER — APPOINTMENT (OUTPATIENT)
Dept: CARDIOLOGY | Facility: CLINIC | Age: 83
End: 2023-01-18

## 2023-02-02 NOTE — H&P ADULT - PROBLEM/PLAN-10
Type of Form Received: order    Form Received (Date) 2/2/23   Form Filled out Yes 2/6/23   Placed in provider folder Yes     
DISPLAY PLAN FREE TEXT

## 2023-02-08 RX ORDER — ACETAMINOPHEN 500 MG
2 TABLET ORAL
Qty: 0 | Refills: 0 | DISCHARGE

## 2023-02-08 RX ORDER — PANTOPRAZOLE SODIUM 20 MG/1
1 TABLET, DELAYED RELEASE ORAL
Qty: 0 | Refills: 0 | DISCHARGE

## 2023-02-08 RX ORDER — LEVOTHYROXINE SODIUM 125 MCG
1 TABLET ORAL
Qty: 0 | Refills: 0 | DISCHARGE

## 2023-02-08 RX ORDER — TAMSULOSIN HYDROCHLORIDE 0.4 MG/1
1 CAPSULE ORAL
Qty: 0 | Refills: 0 | DISCHARGE

## 2023-02-08 RX ORDER — SIMETHICONE 80 MG/1
1 TABLET, CHEWABLE ORAL
Qty: 0 | Refills: 0 | DISCHARGE

## 2023-02-08 RX ORDER — METOPROLOL TARTRATE 50 MG
1 TABLET ORAL
Qty: 0 | Refills: 0 | DISCHARGE

## 2023-02-08 RX ORDER — GABAPENTIN 400 MG/1
1 CAPSULE ORAL
Qty: 0 | Refills: 0 | DISCHARGE

## 2023-02-08 RX ORDER — LIPASE/PROTEASE/AMYLASE 16-48-48K
3 CAPSULE,DELAYED RELEASE (ENTERIC COATED) ORAL
Qty: 0 | Refills: 0 | DISCHARGE

## 2023-02-08 RX ORDER — ALPRAZOLAM 0.25 MG
0 TABLET ORAL
Qty: 0 | Refills: 0 | DISCHARGE

## 2023-02-08 RX ORDER — DULOXETINE HYDROCHLORIDE 30 MG/1
1 CAPSULE, DELAYED RELEASE ORAL
Qty: 0 | Refills: 0 | DISCHARGE

## 2023-02-08 RX ORDER — AMIODARONE HYDROCHLORIDE 400 MG/1
1 TABLET ORAL
Qty: 0 | Refills: 0 | DISCHARGE

## 2023-02-08 RX ORDER — SACUBITRIL AND VALSARTAN 24; 26 MG/1; MG/1
1 TABLET, FILM COATED ORAL
Qty: 0 | Refills: 0 | DISCHARGE

## 2023-02-08 RX ORDER — OXYCODONE HYDROCHLORIDE 5 MG/1
1 TABLET ORAL
Qty: 0 | Refills: 0 | DISCHARGE

## 2023-02-08 RX ORDER — ALPRAZOLAM 0.25 MG
1 TABLET ORAL
Qty: 0 | Refills: 0 | DISCHARGE

## 2023-02-08 RX ORDER — LANOLIN ALCOHOL/MO/W.PET/CERES
1 CREAM (GRAM) TOPICAL
Qty: 0 | Refills: 0 | DISCHARGE

## 2023-06-02 NOTE — PROGRESS NOTE ADULT - PROBLEM SELECTOR PROBLEM 2
Septic shock Double Island Pedicle Flap Text: The defect edges were debeveled with a #15 scalpel blade.  Given the location of the defect, shape of the defect and the proximity to free margins a double island pedicle advancement flap was deemed most appropriate.  Using a sterile surgical marker, an appropriate advancement flap was drawn incorporating the defect, outlining the appropriate donor tissue and placing the expected incisions within the relaxed skin tension lines where possible.    The area thus outlined was incised deep to adipose tissue with a #15 scalpel blade.  The skin margins were undermined to an appropriate distance in all directions around the primary defect and laterally outward around the island pedicle utilizing iris scissors.  There was minimal undermining beneath the pedicle flap.

## 2023-08-06 NOTE — PROCEDURE
[Outpatient] : Outpatient [Ambulatory] : Patient is ambulatory. [Walker] : walker [THIS CHAMBER HAS BEEN CLEANED / DISINFECTED] : This chamber has been cleaned / disinfected according to local and hospital policy and procedure prior to this treatment. [____] : Pre-Dive: Time - [unfilled] [___] : Pre-Dive: Value - [unfilled] mg/dL [Patient demonstrated and verbalized proper technique for using air break mask] : Patient demonstrated and verbalized proper technique for using air break mask [Patient educated on the risks of SMOKING prior to HBOT with understanding] : Patient educated on the risks of SMOKING prior to HBOT with understanding [Patient educated on the risks of CONSUMING ALCOHOL prior to HBOT with understanding] : Patient educated on the risks of CONSUMING ALCOHOL prior to HBOT with understanding [100% Cotton] : 100% cotton [Empty all pockets] : empty all pockets [No hair oils, wigs, hairpieces, pins] : no hair oils, wigs, hairpieces, pins  [Pre tx medications] : pre tx medications  [No make-up, creams] : no make-up, creams  [No jewelry] : no jewelry  [No matches, cigarettes, lighters] : no matches, cigarettes, lighters  [Hearing aid removed] : hearing aid removed [Dentures removed] : dentures removed [Ground bracelet on pt's wrist] : ground bracelet on pt's wrist  [Contacts removed] : contacts removed  [Remove nail polish] : remove nail polish  [No reading material] : no reading material  [Bra, undergarments removed] : bra, undergarments removed  [No contraindicated dressings] : no contraindicated dressings [Ground Wire - VISUAL Verification - Intact/Free of Obstruction] : Ground Wire - VISUAL Verification - Intact/Free of Obstruction  [Ground Continuity - Verified < 1 ohm w/ Wrist Strap Eagle] : Ground Continuity - Verified < 1 ohm w/ Wrist Strap Eagle [Number: ___] : Number: [unfilled] [Diagnosis: ___] : Diagnosis: [unfilled] [Clear all fields] : clear all fields [] : No [FreeTextEntry4] : 100 minutes [FreeTextEntry6] : 7428 [FreeTextEntry8] : 6690 [de-identified] : 5548 [de-identified] : 1988 [de-identified] : 5871 [de-identified] : 3893 [de-identified] : 9367 [de-identified] : 1006 [de-identified] : 120 minutes Continue to Observe Discharge

## 2023-08-10 NOTE — ED ADULT NURSE NOTE - NS ED NURSE RECORD ANOTHER HT AND WT
Yes Topical Sulfur Applications Pregnancy And Lactation Text: This medication is considered safe during pregnancy and breast feeding secondary to limited systemic absorption.

## 2023-10-01 NOTE — PROGRESS NOTE ADULT - SUBJECTIVE AND OBJECTIVE BOX
Merlin Mathew, MD   Hospitalist  Pager #[phone]    PROGRESS NOTE:     Patient is a 81y old  Male who presents with a chief complaint of Postoperative wound bleed (08 Nov 2022 07:53)      SUBJECTIVE / OVERNIGHT EVENTS: NEON   Patient has some abdominal pain, denies any N/V   Briefly had some lightheadedness while urinating but otherwise denied any lightheadedness  Denies any F/C     ADDITIONAL REVIEW OF SYSTEMS:    MEDICATIONS  (STANDING):  acetaminophen     Tablet .. 650 milliGRAM(s) Oral every 6 hours  ALPRAZolam 0.25 milliGRAM(s) Oral at bedtime  aMIOdarone    Tablet 200 milliGRAM(s) Oral daily  DULoxetine 60 milliGRAM(s) Oral daily  gabapentin 100 milliGRAM(s) Oral three times a day  heparin  Infusion. 1000 Unit(s)/Hr (10 mL/Hr) IV Continuous <Continuous>  influenza  Vaccine (HIGH DOSE) 0.7 milliLiter(s) IntraMuscular once  levothyroxine 25 MICROGram(s) Oral daily  metoprolol succinate ER 50 milliGRAM(s) Oral daily  pancrelipase  (CREON 24,000 Lipase Units) 3 Capsule(s) Oral three times a day with meals  pantoprazole    Tablet 40 milliGRAM(s) Oral before breakfast  sacubitril 49 mG/valsartan 51 mG 1 Tablet(s) Oral two times a day  tamsulosin 0.4 milliGRAM(s) Oral at bedtime    MEDICATIONS  (PRN):  artificial tears (preservative free) Ophthalmic Solution 1 Drop(s) Both EYES every 2 hours PRN Dry Eyes  diphenhydrAMINE 25 milliGRAM(s) Oral every 6 hours PRN Rash and/or Itching  heparin   Injectable 7000 Unit(s) IV Push every 6 hours PRN For aPTT less than 40  heparin   Injectable 3500 Unit(s) IV Push every 6 hours PRN For aPTT between 40 - 57  melatonin 3 milliGRAM(s) Oral at bedtime PRN Insomnia  metoclopramide Injectable 10 milliGRAM(s) IV Push every 4 hours PRN Nausea and/or Vomiting  ondansetron Injectable 8 milliGRAM(s) IV Push every 8 hours PRN Nausea and/or Vomiting  oxyCODONE    IR 5 milliGRAM(s) Oral every 4 hours PRN Moderate Pain (4 - 6)  simethicone 80 milliGRAM(s) Chew three times a day PRN Gas      CAPILLARY BLOOD GLUCOSE        I&O's Summary    07 Nov 2022 07:01  -  08 Nov 2022 07:00  --------------------------------------------------------  IN: 0 mL / OUT: 510 mL / NET: -510 mL    08 Nov 2022 07:01  -  08 Nov 2022 12:48  --------------------------------------------------------  IN: 0 mL / OUT: 0 mL / NET: 0 mL        PHYSICAL EXAM:  Vital Signs Last 24 Hrs  T(C): 36.4 (08 Nov 2022 09:15), Max: 37.2 (08 Nov 2022 06:09)  T(F): 97.6 (08 Nov 2022 09:15), Max: 98.9 (08 Nov 2022 06:09)  HR: 95 (08 Nov 2022 11:39) (84 - 122)  BP: 90/49 (08 Nov 2022 11:39) (88/49 - 130/65)  BP(mean): --  RR: 17 (08 Nov 2022 10:49) (16 - 20)  SpO2: 100% (08 Nov 2022 10:49) (95% - 100%)    Parameters below as of 08 Nov 2022 10:49  Patient On (Oxygen Delivery Method): room air      CONSTITUTIONAL: NAD, well-developed male   RESPIRATORY: Normal respiratory effort; lungs are clear to auscultation bilaterally  CARDIOVASCULAR: Regular rate and rhythm, normal S1 and S2, no murmur/rub/gallop; No lower extremity edema; Peripheral pulses are 2+ bilaterally  ABDOMEN: Midline abdominal wound with wound vac in place, tender to palpation; normoactive bowel sounds, no rebound/guarding; No hepatosplenomegaly  MUSCULOSKELETAL no clubbing or cyanosis of digits; no joint swelling or tenderness to palpation  PSYCH: A+O to person, place, and time; affect appropriate      LABS:                        8.0    3.68  )-----------( 184      ( 08 Nov 2022 05:47 )             24.9     11-08    134<L>  |  102  |  15  ----------------------------<  139<H>  4.3   |  24  |  0.62    Ca    8.4      08 Nov 2022 05:47  Phos  3.6     11-08  Mg     1.80     11-08      PT/INR - ( 08 Nov 2022 05:47 )   PT: 16.4 sec;   INR: 1.41 ratio         PTT - ( 08 Nov 2022 05:47 )  PTT:30.5 sec            RADIOLOGY & ADDITIONAL TESTS:  Results Reviewed:   Imaging Personally Reviewed:  Electrocardiogram Personally Reviewed:    COORDINATION OF CARE:  Care Discussed with Consultants/Other Providers [Y/N]:  Prior or Outpatient Records Reviewed [Y/N]:   Parent

## 2023-10-02 NOTE — PHYSICAL EXAM
[4 x 4] : 4 x 4  [2 x 2] : 2 x 2  [de-identified] : See Dr. López Note* [FreeTextEntry7] : Right Foot 2nd Foot  [de-identified] : Right Anterior Shoulder- Skin tear and In tact epithelium within measurement  [de-identified] : 6.0 [de-identified] : 0.3 [de-identified] : 0.1 [de-identified] : serous and serosanguineous [de-identified] : bruising  [de-identified] : Xeroform  [de-identified] : Mechanically Cleansed with Normal Saline \par Tegaderm [FreeTextEntry1] : Left Elbow  [de-identified] : No assessed by MD  [de-identified] : Mechanically Cleansed with Normal Saline  [de-identified] : Small [de-identified] : No [de-identified] : Other [de-identified] : No [de-identified] : other [de-identified] : None [de-identified] : None [de-identified] : 100% [de-identified] : No [de-identified] : 3x Weekly [de-identified] : Primary Dressing [TWNoteComboBox4] : Small [TWNoteComboBox5] : No [TWNoteComboBox6] : Traumatic [de-identified] : No [de-identified] : Normal [de-identified] : None [de-identified] : None [de-identified] : 100% [de-identified] : 3x Weekly oral

## 2023-11-10 NOTE — ED ADULT NURSE NOTE - NURSING MUSC JOINTS
What Type Of Note Output Would You Prefer (Optional)?: Standard Output How Severe Are Your Spot(S)?: mild Have Your Spot(S) Been Treated In The Past?: has not been treated Hpi Title: Evaluation of Skin Lesions see above/yes (describe)

## 2023-11-15 NOTE — PHYSICAL THERAPY INITIAL EVALUATION ADULT - GAIT TRAINING, PT EVAL
Detail Level: Zone
GOAL: Pt will ambulate 150' independently with or without AD as needed in 3-4wks.

## 2023-12-20 NOTE — HISTORY OF PRESENT ILLNESS
Review of Systems   Constitutional:  Positive for activity change, appetite change and fatigue. Negative for fever.   Respiratory:  Negative for shortness of breath and wheezing.    Cardiovascular:  Positive for leg swelling. Negative for chest pain.   Gastrointestinal:  Negative for abdominal distention, abdominal pain, constipation and nausea.   Genitourinary:  Negative for difficulty urinating, dysuria and frequency.   Musculoskeletal:  Negative for arthralgias, joint swelling and myalgias.   Skin:  Positive for wound. Negative for rash.   Neurological:  Positive for weakness.   Psychiatric/Behavioral:  Negative for agitation, behavioral problems and confusion.      Objective:     Vital Signs (Most Recent):  Temp: 97.7 °F (36.5 °C) (12/20/23 1200)  Pulse: 86 (12/20/23 1200)  Resp: 18 (12/20/23 1200)  BP: 121/72 (12/20/23 0846)  SpO2: 98 % (12/20/23 1200) Vital Signs (24h Range):  Temp:  [97.7 °F (36.5 °C)-98.6 °F (37 °C)] 97.7 °F (36.5 °C)  Pulse:  [79-90] 86  Resp:  [18-22] 18  SpO2:  [94 %-98 %] 98 %  BP: (114-121)/(67-72) 121/72     Weight: 72.7 kg (160 lb 4.4 oz)  Body mass index is 26.67 kg/m².    Intake/Output Summary (Last 24 hours) at 12/20/2023 1438  Last data filed at 12/20/2023 0800  Gross per 24 hour   Intake 1387.77 ml   Output 900 ml   Net 487.77 ml           Physical Exam  Constitutional:       General: He is not in acute distress.     Appearance: Normal appearance. He is normal weight.   HENT:      Head: Normocephalic and atraumatic.      Nose: Nose normal.   Eyes:      Conjunctiva/sclera: Conjunctivae normal.   Cardiovascular:      Rate and Rhythm: Normal rate and regular rhythm.      Pulses: Normal pulses.      Heart sounds: Normal heart sounds. No murmur heard.     No gallop.   Pulmonary:      Effort: Pulmonary effort is normal. No respiratory distress.      Breath sounds: No wheezing, rhonchi or rales.   Abdominal:      General: Abdomen is flat. Bowel sounds are normal. There is no  distension.      Palpations: Abdomen is soft.      Tenderness: There is no abdominal tenderness.   Musculoskeletal:         General: No swelling or deformity. Normal range of motion.      Cervical back: Normal range of motion.      Right lower leg: Edema present.      Left lower leg: Edema present.      Comments: 1+ b/l legs.   Skin:     General: Skin is warm and dry.      Coloration: Skin is not jaundiced.      Findings: No rash.      Comments: Right forearm skin tear   Neurological:      General: No focal deficit present.      Mental Status: He is alert and oriented to person, place, and time.   Psychiatric:         Mood and Affect: Mood normal.         Behavior: Behavior normal.             Significant Labs: All pertinent labs within the past 24 hours have been reviewed.  Recent Lab Results         12/20/23  1033        Anion Gap 6.0       BUN 17.0       BUN/CREAT RATIO 21       Calcium 7.9       Chloride 107       CO2 24       Creatinine 0.80       eGFR >60       Glucose 82       Potassium 3.9       Sodium 137               Significant Imaging: I have reviewed all pertinent imaging results/findings within the past 24 hours.   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 Pt noticed the ulcer x 2 weeks. Pt has been keeping toe ulcer covered with Silicone toe protector. Pt expressed concerns with toe ulcer in fears that he does not want the toe to be, "infected". Patient accompanied by spouse.

## 2024-02-22 NOTE — CHART NOTE - NSCHARTNOTEFT_GEN_A_CORE
Spoke to patient dtr Una Geller at about 245pm approx 20 minute conversation.   Ms Geller   Discussed at length oncological input/recommendations during Mr Gatica admission  Discussed patient recent CT scan and rec for further evaluation via biopsy  IR and GI consulted, currently patient being considered for EUS/Biopsy  Patient biopsy needed to determine cancer recurrent malignancy vs new primary   Patient interested in future chemo and remains a candidate for systemic therapy  All questions and concerns addressed    Ms Geller endorsing that she has many siblings who are active in patient care plan and concerned for Mr Gatica   Encouraged Ms Geller and her father to designate one person as point of contact to talk to medical teams in order to facilitate better communication.  Family will do so, currently working on HCP and HIPPA forms.  Oncology to continue to follow Spoke to patient dtr Una Geller at about 245pm approx 20 minute conversation.   Ms Geller   Discussed at length oncological input/recommendations during Mr Gatica admission  Discussed patient recent CT scan and rec for further evaluation via biopsy  IR and GI consulted, currently patient being considered for EUS/Biopsy  Patient biopsy needed to determine cancer recurrent malignancy vs new primary   Patient interested in future treatment and remains a candidate for systemic therapy such as immunotherapy  All questions and concerns addressed    Ms Geller endorsing that she has many siblings who are active in patient care plan and concerned for Mr Gatica   Encouraged Ms Geller and her father to designate one person as point of contact to talk to medical teams in order to facilitate better communication.  Family will do so, currently working on HCP and HIPPA forms.  Oncology to continue to follow [Negative] : Genitourinary

## 2024-03-13 NOTE — ED PROVIDER NOTE - WR ORDER DATE AND TIME 4
Spoke with patient. States she already spoke with someone already and was reassured.  Having some light, old blood discharge.  Advised this was ok. No other major PO issues.    14-Sep-2021 19:22

## 2024-04-03 NOTE — DISCHARGE NOTE PROVIDER - HOSPITAL COURSE
Birth Control Pills Counseling: Birth Control Pill Counseling: I discussed with the patient the potential side effects of OCPs including but not limited to increased risk of stroke, heart attack, thrombophlebitis, deep venous thrombosis, hepatic adenomas, breast changes, GI upset, headaches, and depression.  The patient verbalized understanding of the proper use and possible adverse effects of OCPs. All of the patient's questions and concerns were addressed. Erythromycin Pregnancy And Lactation Text: This medication is Pregnancy Category B and is considered safe during pregnancy. It is also excreted in breast milk. Topical Sulfur Applications Pregnancy And Lactation Text: This medication is Pregnancy Category C and has an unknown safety profile during pregnancy. It is unknown if this topical medication is excreted in breast milk. Doxycycline Pregnancy And Lactation Text: This medication is Pregnancy Category D and not consider safe during pregnancy. It is also excreted in breast milk but is considered safe for shorter treatment courses. Tetracycline Pregnancy And Lactation Text: This medication is Pregnancy Category D and not consider safe during pregnancy. It is also excreted in breast milk. Topical Retinoid counseling:  Patient advised to apply a pea-sized amount only at bedtime and wait 30 minutes after washing their face before applying.  If too drying, patient may add a non-comedogenic moisturizer. The patient verbalized understanding of the proper use and possible adverse effects of retinoids.  All of the patient's questions and concerns were addressed. Minocycline Counseling: Patient advised regarding possible photosensitivity and discoloration of the teeth, skin, lips, tongue and gums.  Patient instructed to avoid sunlight, if possible.  When exposed to sunlight, patients should wear protective clothing, sunglasses, and sunscreen.  The patient was instructed to call the office immediately if the following severe adverse effects occur:  hearing changes, easy bruising/bleeding, severe headache, or vision changes.  The patient verbalized understanding of the proper use and possible adverse effects of minocycline.  All of the patient's questions and concerns were addressed. Azithromycin Counseling:  I discussed with the patient the risks of azithromycin including but not limited to GI upset, allergic reaction, drug rash, diarrhea, and yeast infections. Topical Clindamycin Pregnancy And Lactation Text: This medication is Pregnancy Category B and is considered safe during pregnancy. It is unknown if it is excreted in breast milk. Dapsone Pregnancy And Lactation Text: This medication is Pregnancy Category C and is not considered safe during pregnancy or breast feeding. High Dose Vitamin A Counseling: Side effects reviewed, pt to contact office should one occur. Spironolactone Pregnancy And Lactation Text: This medication can cause feminization of the male fetus and should be avoided during pregnancy. The active metabolite is also found in breast milk. Benzoyl Peroxide Counseling: Patient counseled that medicine may cause skin irritation and bleach clothing.  In the event of skin irritation, the patient was advised to reduce the amount of the drug applied or use it less frequently.   The patient verbalized understanding of the proper use and possible adverse effects of benzoyl peroxide.  All of the patient's questions and concerns were addressed. Bactrim Counseling:  I discussed with the patient the risks of sulfa antibiotics including but not limited to GI upset, allergic reaction, drug rash, diarrhea, dizziness, photosensitivity, and yeast infections.  Rarely, more serious reactions can occur including but not limited to aplastic anemia, agranulocytosis, methemoglobinemia, blood dyscrasias, liver or kidney failure, lung infiltrates or desquamative/blistering drug rashes. Birth Control Pills Pregnancy And Lactation Text: This medication should be avoided if pregnant and for the first 30 days post-partum. 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 Patient presents for a scheduled whipple procedure on 10/26 by Dr. Mercer. SICU consulted for close monse-op monitoring.     10/26  - One episode of nonsustained VTACH overnight  - Hypotensive, given 1L IVF bolus, started on negro gtt  - POCUS showed hyperdynamic LV, additional 1L IVF bolus  - Bloody NGT output, protonix increased to BID  - Developed hives over face, trunk, abdomen and upper thigh, likely allergic reaction to Dilaudid. PCA paused.    10/27  - Lactate uptrended to 8. Received total of 3.5L crystalloid and 750 colloid (5% albumin). Started on vaso during the day and weaned off overnight. Discontinued epinephrine gtt. Lactate cleared and UOP improved.  - Rash resolved. Allergy and immunology consulted. No acute interventions. F/u outpatient for allergy testing.    10/28  - off pressors   - VTach runs on 10/28. Mg given    10/29  - Jimenez, A-line d/c'ed, KVO; start limited clears   - No acute events overnight    10/30  - Advanced to full liquid diet  - MOR triglyceride low at 39  - Changed metoprolol 5 mg IV q4hrs to home sotalol 40 mg BID  - Restarted home Xanax and melatonin    10/31 and 11/1   - Patient was transferred out of the SICU, while on the floor, AICD shocked a rhythm, a RRT (10/31) was called, ? SVT vs rapid Afib, was hypotensive and hypoxic, transferred back to SICU for further hemodynamic monitoring.       11/2  - PCA discontinued  - 40mg PO lasix daily started    11/3  - Patient transferred out of the SICU to the floor  - Febrile to 102 ,AMS and new onset Afib, CT head for delirium, CT C/A/P   - Cardiology: c/w amiodarone and metoprolol     11/4  - IR consulted for abdominal collection drainage s/p Whipple     11/5  - IR drainage catheter placement   - BC from 11/4 positive for VRE  - ID consulted for VRE bacteremia: recommending removal of PICC, possible removal of PM, start Daptomycin     11/6   - midline placed     11/7 11/8 11/9  - CORBIN today              Isotretinoin Counseling: Patient should get monthly blood tests, not donate blood, not drive at night if vision affected, not share medication, and not undergo elective surgery for 6 months after tx completed. Side effects reviewed, pt to contact office should one occur. Tazorac Pregnancy And Lactation Text: This medication is not safe during pregnancy. It is unknown if this medication is excreted in breast milk. Azelaic Acid Counseling: Patient counseled that medicine may cause skin irritation and to avoid applying near the eyes.  In the event of skin irritation, the patient was advised to reduce the amount of the drug applied or use it less frequently.   The patient verbalized understanding of the proper use and possible adverse effects of azelaic acid.  All of the patient's questions and concerns were addressed. Winlevi Counseling:  I discussed with the patient the risks of topical clascoterone including but not limited to erythema, scaling, itching, and stinging. Patient voiced their understanding. 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 Patient presents for a scheduled whipple procedure on 10/26 by Dr. Mercer. SICU consulted for close monse-op monitoring.     10/26  - One episode of nonsustained VTACH overnight  - Hypotensive, given 1L IVF bolus, started on negro gtt  - POCUS showed hyperdynamic LV, additional 1L IVF bolus  - Bloody NGT output, protonix increased to BID  - Developed hives over face, trunk, abdomen and upper thigh, likely allergic reaction to Dilaudid. PCA paused.    10/27  - Lactate uptrended to 8. Received total of 3.5L crystalloid and 750 colloid (5% albumin). Started on vaso during the day and weaned off overnight. Discontinued epinephrine gtt. Lactate cleared and UOP improved.  - Rash resolved. Allergy and immunology consulted. No acute interventions. F/u outpatient for allergy testing.    10/28  - off pressors   - VTach runs on 10/28. Mg given    10/29  - Jimenez, A-line d/c'ed, KVO; start limited clears   - No acute events overnight    10/30  - Advanced to full liquid diet  - MOR triglyceride low at 39  - Changed metoprolol 5 mg IV q4hrs to home sotalol 40 mg BID  - Restarted home Xanax and melatonin    10/31 and 11/1   - Patient was transferred out of the SICU, while on the floor, AICD shocked a rhythm, a RRT (10/31) was called, ? SVT vs rapid Afib, was hypotensive and hypoxic, transferred back to SICU for further hemodynamic monitoring.       11/2  - PCA discontinued  - 40mg PO lasix daily started    11/3  - Patient transferred out of the SICU to the floor  - Febrile to 102 ,AMS and new onset Afib, CT head for delirium, CT C/A/P   - Cardiology: c/w amiodarone and metoprolol     11/4  - IR consulted for abdominal collection drainage s/p Whipple     11/5  - IR drainage catheter placement   - BC from 11/4 positive for VRE  - ID consulted for VRE bacteremia: recommending removal of PICC, possible removal of PM, start Daptomycin     11/6   - midline placed     11/9 CORBIN was negative for vegetation           Aklief counseling:  Patient advised to apply a pea-sized amount only at bedtime and wait 30 minutes after washing their face before applying.  If too drying, patient may add a non-comedogenic moisturizer.  The most commonly reported side effects including irritation, redness, scaling, dryness, stinging, burning, itching, and increased risk of sunburn.  The patient verbalized understanding of the proper use and possible adverse effects of retinoids.  All of the patient's questions and concerns were addressed. Topical Retinoid Pregnancy And Lactation Text: This medication is Pregnancy Category C. It is unknown if this medication is excreted in breast milk. Tetracycline Counseling: Patient counseled regarding possible photosensitivity and increased risk for sunburn.  Patient instructed to avoid sunlight, if possible.  When exposed to sunlight, patients should wear protective clothing, sunglasses, and sunscreen.  The patient was instructed to call the office immediately if the following severe adverse effects occur:  hearing changes, easy bruising/bleeding, severe headache, or vision changes.  The patient verbalized understanding of the proper use and possible adverse effects of tetracycline.  All of the patient's questions and concerns were addressed. Patient understands to avoid pregnancy while on therapy due to potential birth defects. Benzoyl Peroxide Pregnancy And Lactation Text: This medication is Pregnancy Category C. It is unknown if benzoyl peroxide is excreted in breast milk. Azithromycin Pregnancy And Lactation Text: This medication is considered safe during pregnancy and is also secreted in breast milk. Include Pregnancy/Lactation Warning?: No Doxycycline Counseling:  Patient counseled regarding possible photosensitivity and increased risk for sunburn.  Patient instructed to avoid sunlight, if possible.  When exposed to sunlight, patients should wear protective clothing, sunglasses, and sunscreen.  The patient was instructed to call the office immediately if the following severe adverse effects occur:  hearing changes, easy bruising/bleeding, severe headache, or vision changes.  The patient verbalized understanding of the proper use and possible adverse effects of doxycycline.  All of the patient's questions and concerns were addressed. High Dose Vitamin A Pregnancy And Lactation Text: High dose vitamin A therapy is contraindicated during pregnancy and breast feeding. Topical Sulfur Applications Counseling: Topical Sulfur Counseling: Patient counseled that this medication may cause skin irritation or allergic reactions.  In the event of skin irritation, the patient was advised to reduce the amount of the drug applied or use it less frequently.   The patient verbalized understanding of the proper use and possible adverse effects of topical sulfur application.  All of the patient's questions and concerns were addressed. Bactrim Pregnancy And Lactation Text: This medication is Pregnancy Category D and is known to cause fetal risk.  It is also excreted in breast milk. Erythromycin Counseling:  I discussed with the patient the risks of erythromycin including but not limited to GI upset, allergic reaction, drug rash, diarrhea, increase in liver enzymes, and yeast infections. Topical Clindamycin Counseling: Patient counseled that this medication may cause skin irritation or allergic reactions.  In the event of skin irritation, the patient was advised to reduce the amount of the drug applied or use it less frequently.   The patient verbalized understanding of the proper use and possible adverse effects of clindamycin.  All of the patient's questions and concerns were addressed. Aklief Pregnancy And Lactation Text: It is unknown if this medication is safe to use during pregnancy.  It is unknown if this medication is excreted in breast milk.  Breastfeeding women should use the topical cream on the smallest area of the skin for the shortest time needed while breastfeeding.  Do not apply to nipple and areola. Sarecycline Counseling: Patient advised regarding possible photosensitivity and discoloration of the teeth, skin, lips, tongue and gums.  Patient instructed to avoid sunlight, if possible.  When exposed to sunlight, patients should wear protective clothing, sunglasses, and sunscreen.  The patient was instructed to call the office immediately if the following severe adverse effects occur:  hearing changes, easy bruising/bleeding, severe headache, or vision changes.  The patient verbalized understanding of the proper use and possible adverse effects of sarecycline.  All of the patient's questions and concerns were addressed. Detail Level: Zone Tazorac Counseling:  Patient advised that medication is irritating and drying.  Patient may need to apply sparingly and wash off after an hour before eventually leaving it on overnight.  The patient verbalized understanding of the proper use and possible adverse effects of tazorac.  All of the patient's questions and concerns were addressed. Winlevi Pregnancy And Lactation Text: This medication is considered safe during pregnancy and breastfeeding. Dapsone Counseling: I discussed with the patient the risks of dapsone including but not limited to hemolytic anemia, agranulocytosis, rashes, methemoglobinemia, kidney failure, peripheral neuropathy, headaches, GI upset, and liver toxicity.  Patients who start dapsone require monitoring including baseline LFTs and weekly CBCs for the first month, then every month thereafter.  The patient verbalized understanding of the proper use and possible adverse effects of dapsone.  All of the patient's questions and concerns were addressed. Isotretinoin Pregnancy And Lactation Text: This medication is Pregnancy Category X and is considered extremely dangerous during pregnancy. It is unknown if it is excreted in breast milk. Spironolactone Counseling: Patient advised regarding risks of diarrhea, abdominal pain, hyperkalemia, birth defects (for female patients), liver toxicity and renal toxicity. The patient may need blood work to monitor liver and kidney function and potassium levels while on therapy. The patient verbalized understanding of the proper use and possible adverse effects of spironolactone.  All of the patient's questions and concerns were addressed. Azelaic Acid Pregnancy And Lactation Text: This medication is considered safe during pregnancy and breast feeding. 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 Patient presents for a scheduled whipple procedure on 10/26 by Dr. Mercer. SICU consulted for close monse-op monitoring.     10/26  - One episode of nonsustained VTACH overnight  - Hypotensive, given 1L IVF bolus, started on negro gtt  - POCUS showed hyperdynamic LV, additional 1L IVF bolus  - Bloody NGT output, protonix increased to BID  - Developed hives over face, trunk, abdomen and upper thigh, likely allergic reaction to Dilaudid. PCA paused.    10/27  - Lactate uptrended to 8. Received total of 3.5L crystalloid and 750 colloid (5% albumin). Started on vaso during the day and weaned off overnight. Discontinued epinephrine gtt. Lactate cleared and UOP improved.  - Rash resolved. Allergy and immunology consulted. No acute interventions. F/u outpatient for allergy testing.    10/28  - off pressors   - VTach runs on 10/28. Mg given    10/29  - Jimenez, A-line d/c'ed, KVO; start limited clears   - No acute events overnight    10/30  - Advanced to full liquid diet  - MOR triglyceride low at 39  - Changed metoprolol 5 mg IV q4hrs to home sotalol 40 mg BID  - Restarted home Xanax and melatonin    10/31 and 11/1   - Patient was transferred out of the SICU, while on the floor, AICD shocked a rhythm, a RRT (10/31) was called, ? SVT vs rapid Afib, was hypotensive and hypoxic, transferred back to SICU for further hemodynamic monitoring.       11/2  - PCA discontinued  - 40mg PO lasix daily started    11/3  - Patient transferred out of the SICU to the floor  - Febrile to 102 ,AMS and new onset Afib, CT head for delirium, CT C/A/P   - Cardiology: c/w amiodarone and metoprolol     11/4  - IR consulted for abdominal collection drainage s/p Whipple     11/5  - IR drainage catheter placement   - BC from 11/4 positive for VRE  - ID consulted for VRE bacteremia: recommending removal of PICC, possible removal of PM, start Daptomycin     11/6   - midline placed     11/9 CORBIN was negative for vegetation    He will be discharged on a course of IV antibiotics and follow up with Dr. Mercer, ID and cardiology as outpatient.             Medical Necessity Clause: Botulinum toxin hyperhidrosis therapy is medically necessary because it effects her socially Detail Level: Detailed Medical Necessity Information: LCD Guidelines vary from payer to payer. Please check with your payer's policy to determine medical necessity.

## 2024-04-16 NOTE — PROGRESS NOTE ADULT - ASSESSMENT
Discharge Facility: [facility]  Discharge Diagnosis: Cerebrovascular accident (CVA) due to embolism of left middle cerebral artery (Multi); AMS (altered mental status)  Admission Date: 4/15/2024  Discharge Date: 4/15/2024    PCP Appointment Date: TBD-office tasked to arrange fup with PCP as needed  Specialist Appointment Date:   Follow up with LOIDA Winter-CNP (Otolaryngology)  Follow up with Richard Hopkins MD (Neurology)  Call to schedule speech therapy and occupational therapy  Call to set up appt for holter monitor later this week. -- call [facility] at [phone]  Hospital Encounter and Summary: Linked   See discharge assessment below for further details  Engagement  Call Start Time: 1247 (4/16/2024  1:16 PM)    Medications  Medications reviewed with patient/caregiver?: Yes (meds discussed with patient) (4/16/2024  1:16 PM)  Is the patient having any side effects they believe may be caused by any medication additions or changes?: No (4/16/2024  1:16 PM)  Does the patient have all medications ordered at discharge?: Yes (4/16/2024  1:16 PM)  Care Management Interventions: No intervention needed (4/16/2024  1:16 PM)  Prescription Comments: see med list (plavix; aspirin; pantoprazole) (4/16/2024  1:16 PM)  Is the patient taking all medications as directed (includes completed medication regime)?: Yes (4/16/2024  1:16 PM)  Care Management Interventions: Provided patient education (4/16/2024  1:16 PM)    Appointments  Does the patient have a primary care provider?: Yes (4/16/2024  1:16 PM)  Care Management Interventions: Educated patient on importance of making appointment; Advised patient to make appointment (4/16/2024  1:16 PM)  Has the patient kept scheduled appointments due by today?: Yes (4/16/2024  1:16 PM)  Care Management Interventions: Advised to schedule with specialist; Educated on importance of keeping appointment (4/16/2024  1:16 PM)    Self  Management  What is the home health agency?: Denies need during outreach call (4/16/2024  1:16 PM)    Patient Teaching  Does the patient have access to their discharge instructions?: Yes (4/16/2024  1:16 PM)  Care Management Interventions: Reviewed instructions with patient (4/16/2024  1:16 PM)  What is the patient's perception of their health status since discharge?: Improving (4/16/2024  1:16 PM)  Is the patient/caregiver able to teach back the hierarchy of who to call/visit for symptoms/problems? PCP, Specialist, Home Health nurse, Urgent Care, ED, 911: Yes (4/16/2024  1:16 PM)  Patient/Caregiver Education Comments: Patient states she is able to walk and completed most ADLs by self but she is still struggling with finer hand movements and weakness in the left arm. States she has called OT already and waiting to hear back from them about starting therapy. She states she was unsure why holter monitor is needed and not sure who to call. Educated patient on uses for holter monitor and to call [facility] per discharge instructions and her cardiologist Dr. Wood. Patient states her friend is able to  new ordered medications for her. States she is wearing an ankle bracelet due to a DUI from 5 months prior and needs at least 3 days before making appts due to how she has to set up her transportation. Patient states she is trying to pay bills to the best of her abilities but has been struggling and does not currently work. SW consult placed. Office tasked to arrange fup with PCP as needed. 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    #Cellulitis of right lower leg and Acute OM of right hallux  - MSSA bacteremia on admission BCx;   -  repeat BCx from 9/16 are NGF  - Surgical pathology of right hallux: +acute Osteomyelitis, MSSA   - Podiatry/wound care following (Dr. 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   - C/w Lasix 20 mg PO qd, Entresto 1 tab BID, Sotalol 40 mg PO BID with hold parameters  - BP low normal, continue to monitor off Bystolic ( at home on 5mg PO qd ), unless cardio recs to start metoprolol interchange  - Monitor VS    #Pulmonary embolism.   - Bilateral PE in 7/2020  - c/w home Xarelto 20mg     #CHF, chronic  - On Lasix 20 mg PO qd, Entresto 1 tab BID, Sotalol 40 mg PO BID with hold parameters  - Tolerating Room air  - no signs of acute volume overload  - Strict I/Os, daily weights  - Monitor and replace electrolytes.  - TTE very limited study, unable to assess LV or RV function, severe MS, bioAVR     #Hx of arrhythmia  - discussed with pt and his children the reason for his ICD placement  - they stated that >20 years ago, the pt had a very fast HR in the setting of a viral infection and the ICD was placed  - pt/family said he did not have a cardiac arrest; thus suspect pt had sustained VT with pulse and had ICD placement  - c/w sotalol     #Anemia (likely 2/2 chronic disease in the setting of pancreatic cancer)  - H/H 9.8/30.0 on admission, baseline hemoglobin unknown  - Currently hemodynamically stable, monitor for signs and symptoms of active bleeding  - Consider transfusion for hemoglobin <8  - continue to monitor thrombocytopenia (improving to near normal now)    #CAD (coronary artery disease).   - Chronic, s/p cardiac stents x2 (15 years ago)  - c/w home Xarelto 20mg     #Pancreatic cancer.   - diagnosed in March 2021. On chemotherapy (does not recall name of medication)  - Continue home medication Pancrelipase 36,000 2 tabs PO TID with meals  - Per podiatry, [facility] scheduled for Whipple's procedure on 9/23/2021 with Dr. Shon Murphy at [place]. Dr. Murphy aware that patient is currently hospitalized and surgery will likely be postponed.     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    #Cellulitis of right lower leg and Acute OM of right hallux  - MSSA bacteremia on admission BCx;   -  repeat BCx from 9/16 are NGF  - Surgical pathology of right hallux: +acute Osteomyelitis, MSSA   - Podiatry/wound care following (Dr. 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   - C/w Lasix 20 mg PO qd, Entresto 1 tab BID, Sotalol 40 mg PO BID with hold parameters  - BP low normal, continue to monitor off Bystolic ( at home on 5mg PO qd )  - Monitor VS    #Pulmonary embolism.   - Bilateral PE in 7/2020  - c/w home Xarelto 20mg     #CHF, chronic  - On Lasix 20 mg PO qd, Entresto 1 tab BID, Sotalol 40 mg PO BID with hold parameters  - Tolerating Room air  - no signs of acute volume overload  - Strict I/Os, daily weights  - Monitor and replace electrolytes.  - TTE very limited study, unable to assess LV or RV function, severe MS, bioAVR     #Hx of arrhythmia  - discussed with pt and his children the reason for his ICD placement  - they stated that >20 years ago, the pt had a very fast HR in the setting of a viral infection and the ICD was placed  - pt/family said he did not have a cardiac arrest; thus suspect pt had sustained VT with pulse and had ICD placement  - c/w sotalol     #Anemia (likely 2/2 chronic disease in the setting of pancreatic cancer)  - H/H 9.8/30.0 on admission, baseline hemoglobin unknown  - Currently hemodynamically stable, monitor for signs and symptoms of active bleeding  - Consider transfusion for hemoglobin <8  - resolved thrombocytopenia     #CAD (coronary artery disease).   - Chronic, s/p cardiac stents x2 (15 years ago)  - c/w home Xarelto 20mg     #Pancreatic cancer.   - diagnosed in March 2021. On chemotherapy (does not recall name of medication)  - Continue home medication Pancrelipase 36,000 2 tabs PO TID with meals  - Per podiatry, [facility]Pt scheduled for Whipple's procedure on 9/23/2021 with Dr. Shon Murphy at [place]. Dr. Murphy aware that patient is currently hospitalized and surgery will likely be postponed.     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    #Cellulitis of right lower leg and Acute OM of right hallux  - MSSA bacteremia on admission BCx;   -  repeat BCx from 9/16 are NGF  - Surgical pathology of right hallux: +acute Osteomyelitis, MSSA   - Podiatry/wound care following (Dr. 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   - C/w Lasix 20 mg PO qd, Entresto 1 tab BID, Sotalol 40 mg PO BID with hold parameters  - BP low normal, continue to monitor off Bystolic ( at home on 5mg PO qd )  - Monitor VS    #Pulmonary embolism.   - Bilateral PE in 7/2020  - c/w home Xarelto 20mg     #CHF, chronic  - On Lasix 20 mg PO qd, Entresto 1 tab BID, Sotalol 40 mg PO BID with hold parameters  - Tolerating Room air  - no signs of acute volume overload  - Strict I/Os, daily weights  - Monitor and replace electrolytes.  - TTE very limited study, unable to assess LV or RV function, severe MS, bioAVR     #Hx of arrhythmia  - discussed with pt and his children the reason for his ICD placement  - they stated that >20 years ago, the pt had a very fast HR in the setting of a viral infection and the ICD was placed  - pt/family said he did not have a cardiac arrest; thus suspect pt had sustained VT with pulse and had ICD placement  - c/w sotalol     #Anemia (likely 2/2 chronic disease in the setting of pancreatic cancer)  - H/H 9.8/30.0 on admission, baseline hemoglobin unknown  - Currently hemodynamically stable, monitor for signs and symptoms of active bleeding  - Consider transfusion for hemoglobin <8  - resolved thrombocytopenia     #CAD (coronary artery disease).   - Chronic, s/p cardiac stents x2 (15 years ago)  - c/w home Xarelto 20mg     #Pancreatic cancer.   - diagnosed in March 2021. On chemotherapy (does not recall name of medication)  - Continue home medication Pancrelipase 36,000 2 tabs PO TID with meals  - Per podiatry, Dr. López spoke with Dr. Murphy at [facility] to be postponed until after resolution of infection.      #Dysphagia  - patient with complaints of coughing/choking on food on 9/18  - S/S consulted, recs dysphagia 3 soft-thin liquids    #DVT ppx  - Xarelto 20mg PO daily

## 2024-05-03 NOTE — ASU PATIENT PROFILE, ADULT - PRESSURE ULCER(S)
[facility]        If you are experiencing any symptoms after discharge, please follow up with your doctor for further instructions.    If you are more than 1 hour late to your scheduled appointment, the appointment will be canceled and rescheduled.    If you are running late to your appointment, please call the phone number listed below to inform us.    [facility]   [address] [place] [address]     Hours of Operation  Monday, Wednesday, Thursday: 7:00 am-7:00 pm  Tuesday, Friday: 7:00 am- 5:30 pm  Saturday 8:00am- 11:30am     Infusion Scheduling  P:[phone]  F: [phone]       **If your infusion requires blood work be sure to have labs drawn 24-48 hrs prior to your scheduled appointment **       [facility] Lab Hours: Located On The First Floor   Walk in or by appointment  Call Central Scheduling ([phone]) for lab appointment  Monday-Friday: 6:00 am-6:30 pm  Saturday: 6:00 am- 2:30 pm   Sunday: Closed       Samira Snow: Manager [facility] [phone]     Karo Nice: Assistant Clinical Manager [facility]: [phone]   
no

## 2024-06-04 NOTE — CONSULT NOTE ADULT - NS_IRCONSULTTYPE_GEN_ALL_CORE
Problem: COPD (Chronic Obstructive Pulmonary Disease)  Goal: Optimal Chronic Illness Coping  Outcome: Progressing  Goal: Optimal Level of Functional [place]  Outcome: Progressing  Goal: Absence of Infection Signs and Symptoms  Outcome: Progressing  Goal: Improved Oral Intake  Outcome: Progressing  Goal: Effective Oxygenation and Ventilation  Outcome: Progressing     Problem: Adult Inpatient Plan of Care  Goal: Plan of Care Review  Outcome: Progressing  Goal: Patient-Specific Goal (Individualized)  Outcome: Progressing       POC of care is ongoing. Patient denies needs or concerns. Safety measures are in place, patient encouraged to call with any needs or concerns.    Non Face-to-Face
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
## 2024-09-10 NOTE — H&P PST ADULT - BIRTH SEX
PATIENT:  Kendell Salcedo  2007       ASSESSMENT:     1. Plantar wart, right foot  Lesion Destruction      2. Pain of right foot                  PLAN:  1. Reviewed medical records.  Patient was counseled and educated on the condition and the diagnosis.    2. The diagnosis, treatment options and prognosis were discussed.    3. Discussed options and the patient wished to proceed with chemical cauterization.  Verbal consent was obtained. All the verrucoid lesion(s) and any surround hyperkeratotic skin lesions were debrided to a level of pinpoint bleeding using a sterile #15 scapel.  The lesions were then cauterized with Cantharidin.  An occlusive dressing was applied to the areas.  Instructed to remove the dressing in the morning. Instruction was given for possible local care.  The patient tolerated the procedure well and without complications.    4. Patient will return in 3 weeks for re-evaluation.       Imaging: I have personally reviewed pertinent films in PACS  Labs, pathology, and Other Studies: I have personally reviewed pertinent reports.      Lesion Destruction    Date/Time: 9/10/2024 5:30 PM    Performed by: Dionicio Killian DPM  Authorized by: Dionicio Killian DPM  Universal Protocol:  Consent: Verbal consent obtained.  Risks and benefits: risks, benefits and alternatives were discussed  Consent given by: parent  Timeout called at: 9/10/2024 5:57 PM.  Patient understanding: patient states understanding of the procedure being performed  Patient identity confirmed: verbally with patient    Procedure Details - Lesion Destruction:     Number of Lesions:  1  Lesion 1:     Body area:  Lower extremity    Lower extremity location:  R foot    Malignancy: benign lesion      Destruction method: chemical removal          Subjective:       HPI  The patient presents with his mother for recurring wart on right foot.  Sharp pain when he walks.  No other pedal problems.    The following portions of the patient's  history were reviewed and updated as appropriate: allergies, current medications, past family history, past medical history, past social history, past surgical history and problem list.  All pertinent labs and images were reviewed.      Past Medical History  Past Medical History:   Diagnosis Date    Abnormal vision     last assessed 05/01/2015    Adenopathy, cervical     last assessed 02/10/16    Asthma        Past Surgical History  Past Surgical History:   Procedure Laterality Date    CIRCUMCISION          Allergies:  Patient has no known allergies.    Medications:  Current Outpatient Medications   Medication Sig Dispense Refill    cetirizine (ZyrTEC) oral solution Take 10 mL (10 mg total) by mouth daily 300 mL 2    Pediatric Multivit-Minerals-C (MULTIVITAMIN GUMMIES CHILDRENS) CHEW Chew (Patient not taking: Reported on 10/30/2023)       No current facility-administered medications for this visit.       Social History:  Social History     Socioeconomic History    Marital status: Single     Spouse name: None    Number of children: None    Years of education: None    Highest education level: None   Occupational History    None   Tobacco Use    Smoking status: Never    Smokeless tobacco: Never    Tobacco comments:     Denied smoke exposure   Substance and Sexual Activity    Alcohol use: None    Drug use: None    Sexual activity: None   Other Topics Concern    None   Social History Narrative    Brushes teeth daily    Lives with mother (single parent)    Denied history of pets in the home    Poor dental hygiene     Social Determinants of Health     Financial Resource Strain: Not on file   Food Insecurity: Not on file   Transportation Needs: Not on file   Physical Activity: Not on file   Stress: Not on file   Intimate Partner Violence: Not on file   Housing Stability: Not on file          Review of Systems   Constitutional:  Negative for chills and fever.   Respiratory:  Negative for cough and shortness of breath.   "  Cardiovascular:  Negative for chest pain.   Gastrointestinal:  Negative for nausea and vomiting.   Musculoskeletal:  Negative for gait problem.   Neurological:  Negative for weakness and numbness.         Objective:      Ht 5' 10\" (1.778 m)   Wt 57 kg (125 lb 9.6 oz)   BMI 18.02 kg/m²          Physical Exam  Vitals reviewed.   Constitutional:       General: He is not in acute distress.     Appearance: He is not toxic-appearing or diaphoretic.   Cardiovascular:      Rate and Rhythm: Normal rate and regular rhythm.      Pulses: Normal pulses.           Dorsalis pedis pulses are 2+ on the right side and 2+ on the left side.        Posterior tibial pulses are 2+ on the right side and 2+ on the left side.   Pulmonary:      Effort: Pulmonary effort is normal. No respiratory distress.   Musculoskeletal:         General: No signs of injury.      Right lower leg: No edema.      Left lower leg: No edema.      Right foot: No foot drop.      Left foot: No foot drop.   Skin:     General: Skin is warm.      Capillary Refill: Capillary refill takes less than 2 seconds.      Coloration: Skin is not cyanotic or mottled.      Findings: No abscess or wound.      Nails: There is no clubbing.      Comments: Circular, non-pigmented verrucous lesion on right submet 3-4 area with keratosis and punctate bleeding.  It is about 1.2 cm in diameter.   Neurological:      General: No focal deficit present.      Mental Status: He is alert and oriented to person, place, and time.      Cranial Nerves: No cranial nerve deficit.      Sensory: No sensory deficit.      Motor: No weakness.      Coordination: Coordination normal.   Psychiatric:         Mood and Affect: Mood normal.         Behavior: Behavior normal.         Thought Content: Thought content normal.         Judgment: Judgment normal.         " Male

## 2024-10-03 NOTE — DISCHARGE NOTE NURSING/CASE MANAGEMENT/SOCIAL WORK - NSPROMEDSBROUGHTTOHOSP_GEN_A_NUR
no
Products Recommended: OTC Neutrogena sunscreen or Elta MD products.\\n\\nPlease schedule a follow-up appointment if you notice any new suspicious lesions lasting more than 3 months or if any existing moles change in color, shape, or size.
Detail Level: Detailed
General Sunscreen Counseling: I recommended a broad spectrum sunscreen with a SPF of 30 or higher.  I explained that SPF 30 sunscreens block approximately 97 percent of the sun's harmful rays.  Sunscreens should be applied at least 15 minutes prior to expected sun exposure and then every 2 hours after that as long as sun exposure continues. If swimming or exercising sunscreen should be reapplied every 45 minutes to an hour after getting wet or sweating.  One ounce, or the equivalent of a shot glass full of sunscreen, is adequate to protect the skin not covered by a bathing suit. I also recommended a lip balm with a sunscreen as well. Sun protective clothing can be used in lieu of sunscreen but must be worn the entire time you are exposed to the sun's rays.

## 2025-06-02 NOTE — PROVIDER CONTACT NOTE (OTHER) - ACTION/TREATMENT ORDERED:
Provider notified, no new orders made. Will continue to monitor. There are no Wet Read(s) to document.

## 2025-08-07 NOTE — ASU PREOP CHECKLIST - SITE MARKED BY ANESTHESIOLOGIST
"  Rehab Group    Start time: 1315  End time: 1410  Patient time total: 55 minutes    attended full group     #3 attended   Group Type: occupational therapy and general health and coping   Group Topic Covered: balanced lifestyle, coping skills, healthy leisure time, problem solving, relationship skills/support systems, self-esteem, and social skills     Group Session Detail:  Pt actively participated in a structured occupational therapy group with the focus on general health and wellbeing to facilitate new learning, leisure exploration, cognitive wellness, and to promote healthy leisure engagements with peers. Pt participated appropriately, following the multi-step guidelines of the game, and appeared comfortable interacting with peers and staff.       Patient Response/Contribution:  positive affect, cooperative with task, organized, supportive of peers, socially appropriate, safe use of materials/supplies, listened actively, expressed understanding of topic, attentive, and actively engaged     Patient Detail:    Pt arrived to group on time. Pt engaged in a novel task, and demonstrated ability to learn this new task. Pt was an active participate, and interacted appropriately with peers and staff. Pt engaged in multiple rounds of the task, and then requested to switch roles (pt was then the \"host.\")     Near the end of group, pt expressed feeling content about possible discharge tomorrow. Pt stated that \"he is ready to try something new\" and \"is hopeful to use skills learned here on the outside.\" Pt expressed feeling grateful for select patients and providers during hospital stay, stating they \"have helped him immensely.\"         [id number] OT Group (2 or more in attendance)      Patient Active Problem List   Diagnosis    Other acne    Social phobia    Panic disorder without agoraphobia    Major depressive disorder, single episode, moderate (H)    Generalized anxiety disorder    Depression    Attention deficit " hyperactivity disorder, predominantly inattentive type    Displacement of lumbar intervertebral disc without myelopathy    Anxiety    Suicidal ideation    Bipolar 1 disorder (H)          n/a

## (undated) DEVICE — ELCTR GROUNDING PAD ADULT COVIDIEN

## (undated) DEVICE — ELCTR BOVIE TIP BLADE INSULATED 2.75" EDGE

## (undated) DEVICE — ZIMMER DERMACARRIER SKIN GRAFT MESH 1.5:1

## (undated) DEVICE — TUBING IV SET GRAVITY 3Y 100" MACRO

## (undated) DEVICE — STAPLER SKIN VISI-STAT 35 WIDE

## (undated) DEVICE — BITE BLOCK ADULT 20 X 27MM (GREEN)

## (undated) DEVICE — POSITIONER STRAP ARMBOARD VELCRO TS-30

## (undated) DEVICE — DRSG STERISTRIPS 0.5 X 4"

## (undated) DEVICE — UNDERPAD LINEN SAVER 17 X 24"

## (undated) DEVICE — PACK MAJOR ABDOMINAL WITH LAP

## (undated) DEVICE — LUBRICATING JELLY HR ONE SHOT 3G

## (undated) DEVICE — DRSG MASTISOL

## (undated) DEVICE — SUT MONOSOF 4-0 18" P-12

## (undated) DEVICE — DRSG KLING 4"

## (undated) DEVICE — DRSG KERLIX ROLL 4.5"

## (undated) DEVICE — PLV-SCD MACHINE: Type: DURABLE MEDICAL EQUIPMENT

## (undated) DEVICE — SALIVA EJECTOR (BLUE)

## (undated) DEVICE — DURABLE MEDICAL EQUIPMENT: Type: DURABLE MEDICAL EQUIPMENT

## (undated) DEVICE — WARMING BLANKET UPPER ADULT

## (undated) DEVICE — ZIMMER DERMACARRIER SKIN GRAFT MESH 3:1

## (undated) DEVICE — DRSG BANDAID 0.75X3"

## (undated) DEVICE — ZIMMER BLADE DERMATONE

## (undated) DEVICE — BASIN EMESIS 10IN GRADUATED MAUVE

## (undated) DEVICE — ATTACH DISTAL DISP

## (undated) DEVICE — DRAPE SPLIT SHEET 77" X 108"

## (undated) DEVICE — BIOPSY FORCEP RADIAL JAW 4 STANDARD WITH NEEDLE

## (undated) DEVICE — GLV 8 PROTEXIS (WHITE)

## (undated) DEVICE — DENTURE CUP PINK

## (undated) DEVICE — TUBING MEDI-VAC W MAXIGRIP CONNECTORS 1/4"X6'

## (undated) DEVICE — GOWN LG

## (undated) DEVICE — VENODYNE/SCD SLEEVE CALF LARGE

## (undated) DEVICE — DRSG VAC GRANUFOAM LARGE (BLACK)

## (undated) DEVICE — PACK LOWER EXTREMITY NS PLAINVI

## (undated) DEVICE — WARMING BLANKET FULL UNDERBODY

## (undated) DEVICE — CONTAINER FORMALIN 10% 20ML

## (undated) DEVICE — SOL IRR POUR H2O 1500ML

## (undated) DEVICE — SAW BLADE RASP CONMED LINVATEC MICRO 100 OSCILLATING 5.5 X 13 X 0.4MM

## (undated) DEVICE — BUR MICROAIRE CARBIDE OVAL 4MM 8 FLUTE

## (undated) DEVICE — BUR RND 4X41.7MM CRB

## (undated) DEVICE — DRSG XEROFORM 5 X 9"

## (undated) DEVICE — SUT POLYSORB 3-0 18" P-12 UNDYED

## (undated) DEVICE — VENODYNE/SCD SLEEVE CALF MEDIUM

## (undated) DEVICE — PACK IV START WITH CHG

## (undated) DEVICE — GLV 7.5 PROTEXIS (CREAM) NEU-THERA

## (undated) DEVICE — BIOPSY FORCEP COLD DISP

## (undated) DEVICE — DRAPE BILATERAL LIMB 72" X 120"

## (undated) DEVICE — SAW BLADE LINVATEC SAGITTAL 19.5X41.0X..4MM COARSE

## (undated) DEVICE — LIJ-ZIMMER MESHGRAFTER: Type: DURABLE MEDICAL EQUIPMENT

## (undated) DEVICE — CANISTER DISPOSABLE THIN WALL 3000CC

## (undated) DEVICE — DRAPE LIGHT HANDLE COVER (GREEN)

## (undated) DEVICE — DRSG CURITY GAUZE SPONGE 4 X 4" 12-PLY NON-STERILE

## (undated) DEVICE — DRAPE INSTRUMENT POUCH 6.75" X 11"

## (undated) DEVICE — LINE BREATHE SAMPLNG

## (undated) DEVICE — SUT CHROMIC 4-0 27" SH

## (undated) DEVICE — CONTAINER FORMALIN 80ML YELLOW

## (undated) DEVICE — SYR LUER LOK 3CC

## (undated) DEVICE — SAW BLADE LINVATEC SAGITTAL MIC 9.5X25.5X0.4MM

## (undated) DEVICE — CLAMP BX HOT RAD JAW 3

## (undated) DEVICE — DRSG 2X2

## (undated) DEVICE — SOL IRR POUR NS 0.9% 1500ML

## (undated) DEVICE — LABELS BLANK W PEN

## (undated) DEVICE — BALLOON US ENDO

## (undated) DEVICE — SYS BIOPSY NDL FILE SS STRL 22G

## (undated) DEVICE — CATH IV SAFE BC 22G X 1" (BLUE)

## (undated) DEVICE — ELCTR ECG CONDUCTIVE ADHESIVE

## (undated) DEVICE — DRSG STOCKINETTE IMPERVIOUS LG

## (undated) DEVICE — DRSG COBAN 4"

## (undated) DEVICE — DRAPE U POLY BLUE 60"X60"